# Patient Record
Sex: FEMALE | Race: WHITE | NOT HISPANIC OR LATINO | Employment: OTHER | URBAN - METROPOLITAN AREA
[De-identification: names, ages, dates, MRNs, and addresses within clinical notes are randomized per-mention and may not be internally consistent; named-entity substitution may affect disease eponyms.]

---

## 2017-01-05 ENCOUNTER — HOSPITAL ENCOUNTER (OUTPATIENT)
Dept: ONCOLOGY | Facility: CLINIC | Age: 62
Discharge: HOME | End: 2017-01-05
Attending: INTERNAL MEDICINE | Admitting: INTERNAL MEDICINE

## 2017-01-05 LAB — MAGNESIUM SERPL-MCNC: 1.3 MG/DL (ref 1.8–2.5)

## 2017-01-12 ENCOUNTER — HOSPITAL ENCOUNTER (OUTPATIENT)
Dept: CARDIOLOGY | Facility: HOSPITAL | Age: 62
Discharge: HOME | End: 2017-01-12
Attending: INTERNAL MEDICINE | Admitting: INTERNAL MEDICINE

## 2017-01-13 ENCOUNTER — HOSPITAL ENCOUNTER (OUTPATIENT)
Dept: ONCOLOGY | Facility: CLINIC | Age: 62
Discharge: HOME | End: 2017-01-13
Attending: INTERNAL MEDICINE | Admitting: INTERNAL MEDICINE

## 2017-01-13 LAB — MAGNESIUM SERPL-MCNC: 1 MG/DL (ref 1.8–2.5)

## 2017-01-19 ENCOUNTER — HOSPITAL ENCOUNTER (OUTPATIENT)
Dept: ONCOLOGY | Facility: CLINIC | Age: 62
Discharge: HOME | End: 2017-01-19
Attending: INTERNAL MEDICINE | Admitting: INTERNAL MEDICINE

## 2017-01-19 LAB
ALBUMIN SERPL-MCNC: 3.5 G/DL (ref 3.5–4.8)
ALBUMIN/GLOB SERPL: 1.3 {RATIO} (ref 1–1.7)
ALP SERPL-CCNC: 79 IU/L (ref 32–91)
ALT SERPL-CCNC: 25 IU/L (ref 14–54)
ANION GAP SERPL CALC-SCNC: 13.3 MMOL/L (ref 10–20)
AST SERPL-CCNC: 31 IU/L (ref 15–41)
BILIRUB SERPL-MCNC: 0.3 MG/DL (ref 0.3–1.2)
BUN SERPL-MCNC: 19 MG/DL (ref 8–20)
BUN/CREAT SERPL: 19 (ref 5.4–26.2)
CALCIUM SERPL-MCNC: 9.7 MG/DL (ref 8.9–10.3)
CHLORIDE SERPL-SCNC: 105 MMOL/L (ref 101–111)
CONV CO2: 24 MMOL/L (ref 22–32)
CONV TOTAL PROTEIN: 6.2 G/DL (ref 6.1–7.9)
CREAT UR-MCNC: 1 MG/DL (ref 0.4–1)
GLOBULIN UR ELPH-MCNC: 2.7 G/DL (ref 2.5–3.8)
GLUCOSE SERPL-MCNC: 137 MG/DL (ref 65–99)
POTASSIUM SERPL-SCNC: 4.3 MMOL/L (ref 3.6–5.1)
SODIUM SERPL-SCNC: 138 MMOL/L (ref 136–144)

## 2017-01-30 ENCOUNTER — HOSPITAL ENCOUNTER (OUTPATIENT)
Dept: ONCOLOGY | Facility: CLINIC | Age: 62
Discharge: HOME | End: 2017-01-30
Attending: INTERNAL MEDICINE | Admitting: INTERNAL MEDICINE

## 2017-01-30 LAB — MAGNESIUM SERPL-MCNC: 1.4 MG/DL (ref 1.8–2.5)

## 2017-02-02 ENCOUNTER — HOSPITAL ENCOUNTER (OUTPATIENT)
Dept: PAIN MEDICINE | Facility: HOSPITAL | Age: 62
Discharge: HOME | End: 2017-02-02
Attending: PHYSICAL MEDICINE & REHABILITATION | Admitting: PHYSICAL MEDICINE & REHABILITATION

## 2017-02-03 ENCOUNTER — HOSPITAL ENCOUNTER (OUTPATIENT)
Dept: ONCOLOGY | Facility: CLINIC | Age: 62
Discharge: HOME | End: 2017-02-03
Attending: NURSE PRACTITIONER | Admitting: NURSE PRACTITIONER

## 2017-02-06 ENCOUNTER — HOSPITAL ENCOUNTER (OUTPATIENT)
Dept: ONCOLOGY | Facility: CLINIC | Age: 62
Discharge: HOME | End: 2017-02-06
Attending: INTERNAL MEDICINE | Admitting: INTERNAL MEDICINE

## 2017-02-06 LAB
INR PPP: 1.1 (ref 2–3)
MAGNESIUM SERPL-MCNC: 1.1 MG/DL (ref 1.8–2.5)
PROTHROMBIN TIME: 14.8 SEC (ref 19.4–28.5)

## 2017-02-09 ENCOUNTER — HOSPITAL ENCOUNTER (OUTPATIENT)
Dept: ONCOLOGY | Facility: CLINIC | Age: 62
Discharge: HOME | End: 2017-02-09
Attending: INTERNAL MEDICINE | Admitting: INTERNAL MEDICINE

## 2017-02-16 ENCOUNTER — HOSPITAL ENCOUNTER (OUTPATIENT)
Dept: ONCOLOGY | Facility: CLINIC | Age: 62
Discharge: HOME | End: 2017-02-16
Attending: INTERNAL MEDICINE | Admitting: INTERNAL MEDICINE

## 2017-02-23 ENCOUNTER — HOSPITAL ENCOUNTER (OUTPATIENT)
Dept: ONCOLOGY | Facility: CLINIC | Age: 62
Discharge: HOME | End: 2017-02-23
Attending: INTERNAL MEDICINE | Admitting: INTERNAL MEDICINE

## 2017-02-27 ENCOUNTER — HOSPITAL ENCOUNTER (OUTPATIENT)
Dept: ONCOLOGY | Facility: CLINIC | Age: 62
Discharge: HOME | End: 2017-02-27
Attending: INTERNAL MEDICINE | Admitting: INTERNAL MEDICINE

## 2017-02-27 LAB — CREAT BLDA-MCNC: 0.8 MG/DL (ref 0.6–1.3)

## 2017-03-02 ENCOUNTER — HOSPITAL ENCOUNTER (OUTPATIENT)
Dept: ONCOLOGY | Facility: CLINIC | Age: 62
Discharge: HOME | End: 2017-03-02
Attending: INTERNAL MEDICINE | Admitting: INTERNAL MEDICINE

## 2017-03-02 LAB — MAGNESIUM SERPL-MCNC: 1.5 MG/DL (ref 1.8–2.5)

## 2017-03-06 ENCOUNTER — HOSPITAL ENCOUNTER (OUTPATIENT)
Dept: ONCOLOGY | Facility: CLINIC | Age: 62
Discharge: HOME | End: 2017-03-06
Attending: INTERNAL MEDICINE | Admitting: INTERNAL MEDICINE

## 2017-03-06 LAB
ALBUMIN SERPL-MCNC: 3.6 G/DL (ref 3.5–4.8)
ALBUMIN/GLOB SERPL: 1.3 {RATIO} (ref 1–1.7)
ALP SERPL-CCNC: 78 IU/L (ref 32–91)
ALT SERPL-CCNC: 24 IU/L (ref 14–54)
ANION GAP SERPL CALC-SCNC: 14 MMOL/L (ref 10–20)
AST SERPL-CCNC: 22 IU/L (ref 15–41)
BILIRUB SERPL-MCNC: 0.7 MG/DL (ref 0.3–1.2)
BUN SERPL-MCNC: 16 MG/DL (ref 8–20)
BUN/CREAT SERPL: 17.8 (ref 5.4–26.2)
CALCIUM SERPL-MCNC: 9.4 MG/DL (ref 8.9–10.3)
CANCER AG125 SERPL-ACNC: 18 U/ML (ref 0–35)
CHLORIDE SERPL-SCNC: 101 MMOL/L (ref 101–111)
CONV CO2: 28 MMOL/L (ref 22–32)
CONV TOTAL PROTEIN: 6.3 G/DL (ref 6.1–7.9)
CREAT UR-MCNC: 0.9 MG/DL (ref 0.4–1)
GLOBULIN UR ELPH-MCNC: 2.7 G/DL (ref 2.5–3.8)
GLUCOSE SERPL-MCNC: 120 MG/DL (ref 65–99)
MAGNESIUM SERPL-MCNC: 1.4 MG/DL (ref 1.8–2.5)
POTASSIUM SERPL-SCNC: 4 MMOL/L (ref 3.6–5.1)
SODIUM SERPL-SCNC: 139 MMOL/L (ref 136–144)

## 2017-03-09 ENCOUNTER — HOSPITAL ENCOUNTER (OUTPATIENT)
Dept: ONCOLOGY | Facility: CLINIC | Age: 62
Discharge: HOME | End: 2017-03-09
Attending: INTERNAL MEDICINE | Admitting: INTERNAL MEDICINE

## 2017-03-09 LAB — MAGNESIUM SERPL-MCNC: 1.2 MG/DL (ref 1.8–2.5)

## 2017-03-13 ENCOUNTER — HOSPITAL ENCOUNTER (OUTPATIENT)
Dept: ONCOLOGY | Facility: CLINIC | Age: 62
Discharge: HOME | End: 2017-03-13
Attending: INTERNAL MEDICINE | Admitting: INTERNAL MEDICINE

## 2017-03-13 LAB — MAGNESIUM SERPL-MCNC: 1.2 MG/DL (ref 1.8–2.5)

## 2017-03-16 ENCOUNTER — HOSPITAL ENCOUNTER (OUTPATIENT)
Dept: ONCOLOGY | Facility: CLINIC | Age: 62
Discharge: HOME | End: 2017-03-16
Attending: INTERNAL MEDICINE | Admitting: INTERNAL MEDICINE

## 2017-03-16 LAB
ALBUMIN SERPL-MCNC: 3.1 G/DL (ref 3.5–4.8)
ALBUMIN/GLOB SERPL: 1.1 {RATIO} (ref 1–1.7)
ALP SERPL-CCNC: 66 IU/L (ref 32–91)
ALT SERPL-CCNC: 20 IU/L (ref 14–54)
ANION GAP SERPL CALC-SCNC: 16.2 MMOL/L (ref 10–20)
AST SERPL-CCNC: 24 IU/L (ref 15–41)
BILIRUB SERPL-MCNC: 0.3 MG/DL (ref 0.3–1.2)
BUN SERPL-MCNC: 18 MG/DL (ref 8–20)
BUN/CREAT SERPL: 18 (ref 5.4–26.2)
CALCIUM SERPL-MCNC: 9.6 MG/DL (ref 8.9–10.3)
CHLORIDE SERPL-SCNC: 102 MMOL/L (ref 101–111)
CONV CO2: 21 MMOL/L (ref 22–32)
CONV TOTAL PROTEIN: 6 G/DL (ref 6.1–7.9)
CREAT UR-MCNC: 1 MG/DL (ref 0.4–1)
GLOBULIN UR ELPH-MCNC: 2.9 G/DL (ref 2.5–3.8)
GLUCOSE SERPL-MCNC: 159 MG/DL (ref 65–99)
POTASSIUM SERPL-SCNC: 4.2 MMOL/L (ref 3.6–5.1)
SODIUM SERPL-SCNC: 135 MMOL/L (ref 136–144)

## 2017-03-21 ENCOUNTER — INPATIENT HOSPITAL (OUTPATIENT)
Dept: URBAN - METROPOLITAN AREA HOSPITAL 84 | Facility: HOSPITAL | Age: 62
End: 2017-03-21
Payer: COMMERCIAL

## 2017-03-21 DIAGNOSIS — R11.2 NAUSEA WITH VOMITING, UNSPECIFIED: ICD-10-CM

## 2017-03-21 DIAGNOSIS — R07.89 OTHER CHEST PAIN: ICD-10-CM

## 2017-03-21 DIAGNOSIS — D50.9 IRON DEFICIENCY ANEMIA, UNSPECIFIED: ICD-10-CM

## 2017-03-21 DIAGNOSIS — K44.9 DIAPHRAGMATIC HERNIA WITHOUT OBSTRUCTION OR GANGRENE: ICD-10-CM

## 2017-03-21 PROCEDURE — 99222 1ST HOSP IP/OBS MODERATE 55: CPT | Performed by: NURSE PRACTITIONER

## 2017-03-21 PROCEDURE — 43249 ESOPH EGD DILATION <30 MM: CPT | Performed by: INTERNAL MEDICINE

## 2017-03-27 ENCOUNTER — HOSPITAL ENCOUNTER (OUTPATIENT)
Dept: LAB | Facility: HOSPITAL | Age: 62
Discharge: HOME | End: 2017-03-27
Attending: INTERNAL MEDICINE | Admitting: INTERNAL MEDICINE

## 2017-03-27 LAB
ALBUMIN SERPL-MCNC: 2.7 G/DL (ref 3.5–4.8)
ALBUMIN/GLOB SERPL: 0.8 {RATIO} (ref 1–1.7)
ALP SERPL-CCNC: 70 IU/L (ref 32–91)
ALT SERPL-CCNC: 23 IU/L (ref 14–54)
ANION GAP SERPL CALC-SCNC: 17.9 MMOL/L (ref 10–20)
AST SERPL-CCNC: 28 IU/L (ref 15–41)
BASOPHILS # BLD AUTO: 0 10*3/UL (ref 0–0.2)
BASOPHILS NFR BLD AUTO: 0 % (ref 0–2)
BILIRUB SERPL-MCNC: 0.4 MG/DL (ref 0.3–1.2)
BUN SERPL-MCNC: 19 MG/DL (ref 8–20)
BUN/CREAT SERPL: 19 (ref 5.4–26.2)
CALCIUM SERPL-MCNC: 9.5 MG/DL (ref 8.9–10.3)
CHLORIDE SERPL-SCNC: 95 MMOL/L (ref 101–111)
CONV CO2: 27 MMOL/L (ref 22–32)
CONV TOTAL PROTEIN: 5.9 G/DL (ref 6.1–7.9)
CREAT UR-MCNC: 1 MG/DL (ref 0.4–1)
DIFFERENTIAL METHOD BLD: (no result)
EOSINOPHIL # BLD AUTO: 0 10*3/UL (ref 0–0.3)
EOSINOPHIL # BLD AUTO: 1 % (ref 0–3)
ERYTHROCYTE [DISTWIDTH] IN BLOOD BY AUTOMATED COUNT: 18.8 % (ref 11.5–14.5)
GLOBULIN UR ELPH-MCNC: 3.2 G/DL (ref 2.5–3.8)
GLUCOSE SERPL-MCNC: 129 MG/DL (ref 65–99)
HCT VFR BLD AUTO: 29.6 % (ref 35–49)
HGB BLD-MCNC: (no result) G/DL (ref 12–15)
INR PPP: 1 (ref 2–3)
LYMPHOCYTES # BLD AUTO: 0.4 10*3/UL (ref 0.8–4.8)
LYMPHOCYTES NFR BLD AUTO: 9 % (ref 18–42)
MAGNESIUM SERPL-MCNC: 1.4 MG/DL (ref 1.8–2.5)
MCH RBC QN AUTO: 30.4 PG (ref 26–32)
MCHC RBC AUTO-ENTMCNC: 33.1 G/DL (ref 32–36)
MCV RBC AUTO: 91.9 FL (ref 80–94)
MONOCYTES # BLD AUTO: 0.8 10*3/UL (ref 0.1–1.3)
MONOCYTES NFR BLD AUTO: 18 % (ref 2–11)
NEUTROPHILS # BLD AUTO: 3.2 10*3/UL (ref 2.3–8.6)
NEUTROPHILS NFR BLD AUTO: 72 % (ref 50–75)
NRBC BLD AUTO-RTO: 0 /100{WBCS}
NRBC/RBC NFR BLD MANUAL: 0 10*3/UL
PHOSPHATE SERPL-MCNC: 2 MG/DL (ref 2.4–4.7)
PLATELET # BLD AUTO: 117 10*3/UL (ref 150–450)
PMV BLD AUTO: 10.8 FL (ref 7.4–10.4)
POTASSIUM SERPL-SCNC: 3.9 MMOL/L (ref 3.6–5.1)
PROTHROMBIN TIME: 11.6 SEC (ref 19.4–28.5)
RBC # BLD AUTO: 3.22 10*6/UL (ref 4–5.4)
SODIUM SERPL-SCNC: 136 MMOL/L (ref 136–144)
WBC # BLD AUTO: 4.5 10*3/UL (ref 4.5–11.5)

## 2017-04-06 ENCOUNTER — HOSPITAL ENCOUNTER (OUTPATIENT)
Dept: PAIN MEDICINE | Facility: HOSPITAL | Age: 62
Discharge: HOME | End: 2017-04-06
Attending: PHYSICAL MEDICINE & REHABILITATION | Admitting: PHYSICAL MEDICINE & REHABILITATION

## 2017-04-06 ENCOUNTER — HOSPITAL ENCOUNTER (OUTPATIENT)
Dept: ONCOLOGY | Facility: CLINIC | Age: 62
Discharge: HOME | End: 2017-04-06
Attending: INTERNAL MEDICINE | Admitting: INTERNAL MEDICINE

## 2017-04-06 LAB
ALBUMIN SERPL-MCNC: 3 G/DL (ref 3.5–4.8)
ALBUMIN/GLOB SERPL: 1 {RATIO} (ref 1–1.7)
ALP SERPL-CCNC: 75 IU/L (ref 32–91)
ALT SERPL-CCNC: 20 IU/L (ref 14–54)
ANION GAP SERPL CALC-SCNC: 14.3 MMOL/L (ref 10–20)
AST SERPL-CCNC: 26 IU/L (ref 15–41)
BILIRUB SERPL-MCNC: 0.3 MG/DL (ref 0.3–1.2)
BUN SERPL-MCNC: 11 MG/DL (ref 8–20)
BUN/CREAT SERPL: 12.2 (ref 5.4–26.2)
CALCIUM SERPL-MCNC: 9.2 MG/DL (ref 8.9–10.3)
CHLORIDE SERPL-SCNC: 104 MMOL/L (ref 101–111)
CONV CO2: 26 MMOL/L (ref 22–32)
CONV TOTAL PROTEIN: 6.1 G/DL (ref 6.1–7.9)
CREAT UR-MCNC: 0.9 MG/DL (ref 0.4–1)
GLOBULIN UR ELPH-MCNC: 3.1 G/DL (ref 2.5–3.8)
GLUCOSE SERPL-MCNC: 76 MG/DL (ref 65–99)
MAGNESIUM SERPL-MCNC: 1.2 MG/DL (ref 1.8–2.5)
POTASSIUM SERPL-SCNC: 4.3 MMOL/L (ref 3.6–5.1)
SODIUM SERPL-SCNC: 140 MMOL/L (ref 136–144)

## 2017-04-13 ENCOUNTER — HOSPITAL ENCOUNTER (OUTPATIENT)
Dept: ONCOLOGY | Facility: CLINIC | Age: 62
Discharge: HOME | End: 2017-04-13
Attending: INTERNAL MEDICINE | Admitting: INTERNAL MEDICINE

## 2017-04-20 ENCOUNTER — HOSPITAL ENCOUNTER (OUTPATIENT)
Dept: ONCOLOGY | Facility: CLINIC | Age: 62
Discharge: HOME | End: 2017-04-20
Attending: INTERNAL MEDICINE | Admitting: INTERNAL MEDICINE

## 2017-05-01 ENCOUNTER — HOSPITAL ENCOUNTER (OUTPATIENT)
Dept: FAMILY MEDICINE CLINIC | Facility: CLINIC | Age: 62
Setting detail: SPECIMEN
Discharge: HOME | End: 2017-05-01
Attending: FAMILY MEDICINE | Admitting: FAMILY MEDICINE

## 2017-05-01 LAB
CHOLEST SERPL-MCNC: 305 MG/DL
CHOLEST/HDLC SERPL: 5.3 {RATIO}
CONV LDL CHOLESTEROL DIRECT: 217 MG/DL (ref 0–100)
HDLC SERPL-MCNC: 57 MG/DL
LDLC/HDLC SERPL: 3.8 {RATIO}
LIPID INTERPRETATION: ABNORMAL
TRIGL SERPL-MCNC: 85 MG/DL
VLDLC SERPL CALC-MCNC: 30.4 MG/DL

## 2017-05-04 ENCOUNTER — HOSPITAL ENCOUNTER (OUTPATIENT)
Dept: ONCOLOGY | Facility: CLINIC | Age: 62
Discharge: HOME | End: 2017-05-04
Attending: INTERNAL MEDICINE | Admitting: INTERNAL MEDICINE

## 2017-05-16 ENCOUNTER — HOSPITAL ENCOUNTER (OUTPATIENT)
Dept: ONCOLOGY | Facility: CLINIC | Age: 62
Discharge: HOME | End: 2017-05-16
Attending: INTERNAL MEDICINE | Admitting: INTERNAL MEDICINE

## 2017-05-16 LAB
ALBUMIN SERPL-MCNC: 3.4 G/DL (ref 3.5–4.8)
ALBUMIN/GLOB SERPL: 1.3 {RATIO} (ref 1–1.7)
ALP SERPL-CCNC: 89 IU/L (ref 32–91)
ALT SERPL-CCNC: 13 IU/L (ref 14–54)
ANION GAP SERPL CALC-SCNC: 13.4 MMOL/L (ref 10–20)
AST SERPL-CCNC: 23 IU/L (ref 15–41)
BILIRUB SERPL-MCNC: 0.2 MG/DL (ref 0.3–1.2)
BUN SERPL-MCNC: 11 MG/DL (ref 8–20)
BUN/CREAT SERPL: 11 (ref 5.4–26.2)
CALCIUM SERPL-MCNC: 9.1 MG/DL (ref 8.9–10.3)
CHLORIDE SERPL-SCNC: 104 MMOL/L (ref 101–111)
CONV CO2: 26 MMOL/L (ref 22–32)
CONV TOTAL PROTEIN: 6.1 G/DL (ref 6.1–7.9)
CREAT UR-MCNC: 1 MG/DL (ref 0.4–1)
GLOBULIN UR ELPH-MCNC: 2.7 G/DL (ref 2.5–3.8)
GLUCOSE SERPL-MCNC: 101 MG/DL (ref 65–99)
MAGNESIUM SERPL-MCNC: 1.5 MG/DL (ref 1.8–2.5)
POTASSIUM SERPL-SCNC: 3.4 MMOL/L (ref 3.6–5.1)
SODIUM SERPL-SCNC: 140 MMOL/L (ref 136–144)

## 2017-05-26 ENCOUNTER — HOSPITAL ENCOUNTER (OUTPATIENT)
Dept: ONCOLOGY | Facility: CLINIC | Age: 62
Discharge: HOME | End: 2017-05-26
Attending: INTERNAL MEDICINE | Admitting: INTERNAL MEDICINE

## 2017-05-26 LAB
ALBUMIN SERPL-MCNC: 3.3 G/DL (ref 3.5–4.8)
ALBUMIN/GLOB SERPL: 1.2 {RATIO} (ref 1–1.7)
ALP SERPL-CCNC: 105 IU/L (ref 32–91)
ALT SERPL-CCNC: 16 IU/L (ref 14–54)
ANION GAP SERPL CALC-SCNC: 15.9 MMOL/L (ref 10–20)
AST SERPL-CCNC: 22 IU/L (ref 15–41)
BILIRUB SERPL-MCNC: 0.5 MG/DL (ref 0.3–1.2)
BUN SERPL-MCNC: 10 MG/DL (ref 8–20)
BUN/CREAT SERPL: 12.5 (ref 5.4–26.2)
CALCIUM SERPL-MCNC: 9 MG/DL (ref 8.9–10.3)
CHLORIDE SERPL-SCNC: 102 MMOL/L (ref 101–111)
CONV CO2: 25 MMOL/L (ref 22–32)
CONV TOTAL PROTEIN: 6.1 G/DL (ref 6.1–7.9)
CREAT UR-MCNC: 0.8 MG/DL (ref 0.4–1)
GLOBULIN UR ELPH-MCNC: 2.8 G/DL (ref 2.5–3.8)
GLUCOSE SERPL-MCNC: 94 MG/DL (ref 65–99)
MAGNESIUM SERPL-MCNC: 1.4 MG/DL (ref 1.8–2.5)
POTASSIUM SERPL-SCNC: 2.9 MMOL/L (ref 3.6–5.1)
SODIUM SERPL-SCNC: 140 MMOL/L (ref 136–144)

## 2017-05-30 ENCOUNTER — HOSPITAL ENCOUNTER (OUTPATIENT)
Dept: ONCOLOGY | Facility: CLINIC | Age: 62
Discharge: HOME | End: 2017-05-30
Attending: INTERNAL MEDICINE | Admitting: INTERNAL MEDICINE

## 2017-05-30 LAB — GLUCOSE BLD-MCNC: 89 MG/DL (ref 70–105)

## 2017-06-06 ENCOUNTER — HOSPITAL ENCOUNTER (OUTPATIENT)
Dept: PAIN MEDICINE | Facility: HOSPITAL | Age: 62
Discharge: HOME | End: 2017-06-06
Attending: PHYSICAL MEDICINE & REHABILITATION | Admitting: PHYSICAL MEDICINE & REHABILITATION

## 2017-06-08 ENCOUNTER — HOSPITAL ENCOUNTER (OUTPATIENT)
Dept: ONCOLOGY | Facility: CLINIC | Age: 62
Discharge: HOME | End: 2017-06-08
Attending: INTERNAL MEDICINE | Admitting: INTERNAL MEDICINE

## 2017-06-13 ENCOUNTER — HOSPITAL ENCOUNTER (OUTPATIENT)
Dept: ONCOLOGY | Facility: CLINIC | Age: 62
Discharge: HOME | End: 2017-06-13
Attending: INTERNAL MEDICINE | Admitting: INTERNAL MEDICINE

## 2017-06-13 LAB
ALBUMIN SERPL-MCNC: 3.3 G/DL (ref 3.5–4.8)
ALBUMIN/GLOB SERPL: 1.4 {RATIO} (ref 1–1.7)
ALP SERPL-CCNC: 83 IU/L (ref 32–91)
ALT SERPL-CCNC: 24 IU/L (ref 14–54)
ANION GAP SERPL CALC-SCNC: 13.6 MMOL/L (ref 10–20)
AST SERPL-CCNC: 34 IU/L (ref 15–41)
BILIRUB SERPL-MCNC: 0.2 MG/DL (ref 0.3–1.2)
BUN SERPL-MCNC: 5 MG/DL (ref 8–20)
BUN/CREAT SERPL: 5.6 (ref 5.4–26.2)
CALCIUM SERPL-MCNC: 8.8 MG/DL (ref 8.9–10.3)
CHLORIDE SERPL-SCNC: 106 MMOL/L (ref 101–111)
CONV CO2: 24 MMOL/L (ref 22–32)
CONV TOTAL PROTEIN: 5.7 G/DL (ref 6.1–7.9)
CREAT UR-MCNC: 0.9 MG/DL (ref 0.4–1)
GLOBULIN UR ELPH-MCNC: 2.4 G/DL (ref 2.5–3.8)
GLUCOSE SERPL-MCNC: 98 MG/DL (ref 65–99)
MAGNESIUM SERPL-MCNC: 1.5 MG/DL (ref 1.8–2.5)
POTASSIUM SERPL-SCNC: 3.6 MMOL/L (ref 3.6–5.1)
SODIUM SERPL-SCNC: 140 MMOL/L (ref 136–144)

## 2017-06-20 ENCOUNTER — HOSPITAL ENCOUNTER (OUTPATIENT)
Dept: ONCOLOGY | Facility: CLINIC | Age: 62
Discharge: HOME | End: 2017-06-20
Attending: INTERNAL MEDICINE | Admitting: INTERNAL MEDICINE

## 2017-06-20 LAB — MAGNESIUM SERPL-MCNC: 1.9 MG/DL (ref 1.8–2.5)

## 2017-06-27 ENCOUNTER — HOSPITAL ENCOUNTER (OUTPATIENT)
Dept: ONCOLOGY | Facility: CLINIC | Age: 62
Discharge: HOME | End: 2017-06-27
Attending: INTERNAL MEDICINE | Admitting: INTERNAL MEDICINE

## 2017-06-27 LAB
ALBUMIN SERPL-MCNC: 2.8 G/DL (ref 3.5–4.8)
ALBUMIN/GLOB SERPL: 1.3 {RATIO} (ref 1–1.7)
ALP SERPL-CCNC: 66 IU/L (ref 32–91)
ALT SERPL-CCNC: 24 IU/L (ref 14–54)
ANION GAP SERPL CALC-SCNC: 11 MMOL/L (ref 10–20)
AST SERPL-CCNC: 37 IU/L (ref 15–41)
BILIRUB SERPL-MCNC: 0.5 MG/DL (ref 0.3–1.2)
BUN SERPL-MCNC: 10 MG/DL (ref 8–20)
BUN/CREAT SERPL: 11.1 (ref 5.4–26.2)
CALCIUM SERPL-MCNC: 8.9 MG/DL (ref 8.9–10.3)
CHLORIDE SERPL-SCNC: 107 MMOL/L (ref 101–111)
CONV CO2: 28 MMOL/L (ref 22–32)
CONV TOTAL PROTEIN: 5 G/DL (ref 6.1–7.9)
CREAT UR-MCNC: 0.9 MG/DL (ref 0.4–1)
GLOBULIN UR ELPH-MCNC: 2.2 G/DL (ref 2.5–3.8)
GLUCOSE SERPL-MCNC: 100 MG/DL (ref 65–99)
POTASSIUM SERPL-SCNC: 4 MMOL/L (ref 3.6–5.1)
SODIUM SERPL-SCNC: 142 MMOL/L (ref 136–144)

## 2017-07-05 ENCOUNTER — HOSPITAL ENCOUNTER (OUTPATIENT)
Dept: GENERAL RADIOLOGY | Facility: HOSPITAL | Age: 62
Discharge: HOME | End: 2017-07-05
Attending: FAMILY MEDICINE | Admitting: FAMILY MEDICINE

## 2017-07-05 LAB
ABS VARIANT LYMPHS: 0.36 10*3/UL
ALBUMIN SERPL-MCNC: 2.9 G/DL (ref 3.5–4.8)
ALBUMIN/GLOB SERPL: 1.4 {RATIO} (ref 1–1.7)
ALP SERPL-CCNC: 82 IU/L (ref 32–91)
ALT SERPL-CCNC: 41 IU/L (ref 14–54)
ANION GAP SERPL CALC-SCNC: 12.1 MMOL/L (ref 10–20)
AST SERPL-CCNC: 49 IU/L (ref 15–41)
BILIRUB SERPL-MCNC: 0.5 MG/DL (ref 0.3–1.2)
BUN SERPL-MCNC: 15 MG/DL (ref 8–20)
BUN/CREAT SERPL: 16.7 (ref 5.4–26.2)
C3 FRG RBC-MCNC: (no result)
CALCIUM SERPL-MCNC: 8.9 MG/DL (ref 8.9–10.3)
CHLORIDE SERPL-SCNC: 108 MMOL/L (ref 101–111)
CONV ABS BANDS: 0.4 10*3/UL
CONV ABS IMM GRAN: 0.09 10*3/UL
CONV ANISOCYTES: (no result)
CONV CO2: 27 MMOL/L (ref 22–32)
CONV DACROCYTES (PRESENCE) IN BLOOD BY LIGHT MICROSCOPY: (no result)
CONV HYPOCHROMIA IN BLOOD BY LIGHT MICROSCOPY: (no result)
CONV MICROCYTES IN BLOOD BY LIGHT MICROSCOPY: (no result)
CONV POLYCHROMASIA IN BLOOD BY LIGHT MICROSCOPY: (no result)
CONV TOTAL PROTEIN: 5 G/DL (ref 6.1–7.9)
CONV VARIANT LYMPHOCYTES RELATIVE PERCENT BY MANUAL COUNT: 4 % (ref 0–1)
CREAT UR-MCNC: 0.9 MG/DL (ref 0.4–1)
DIFFERENTIAL METHOD BLD: (no result)
EOSINOPHIL # BLD AUTO: 0.3 10*3/UL (ref 0–0.3)
EOSINOPHIL # BLD AUTO: 3 % (ref 0–3)
ERYTHROCYTE [DISTWIDTH] IN BLOOD BY AUTOMATED COUNT: (no result) % (ref 11.5–14.5)
GLOBULIN UR ELPH-MCNC: 2.1 G/DL (ref 2.5–3.8)
GLUCOSE SERPL-MCNC: 80 MG/DL (ref 65–99)
HCT VFR BLD AUTO: 24.4 % (ref 35–49)
HGB BLD-MCNC: 7.7 G/DL (ref 12–15)
LYMPHOCYTES # BLD AUTO: 3.1 10*3/UL (ref 0.8–4.8)
LYMPHOCYTES NFR BLD AUTO: 35 % (ref 18–42)
MCH RBC QN AUTO: 29.7 PG (ref 26–32)
MCHC RBC AUTO-ENTMCNC: 31.7 G/DL (ref 32–36)
MCV RBC AUTO: 93.8 FL (ref 80–94)
METAMYELOCYTES NFR BLD: 1 %
MONOCYTES # BLD AUTO: 0.6 10*3/UL (ref 0.1–1.3)
MONOCYTES NFR BLD AUTO: 7 % (ref 2–11)
NEUTROPHILS # BLD AUTO: 4 10*3/UL (ref 2.3–8.6)
NEUTROPHILS NFR BLD AUTO: 46 % (ref 50–75)
NEUTS BAND NFR BLD MANUAL: 4 % (ref 0–5)
NRBC BLD AUTO-RTO: 16 /100{WBCS}
PATHOLOGIST REVIEW: (no result)
PATHOLOGIST REVIEW: (no result)
PLATELET # BLD AUTO: (no result) 10*3/UL (ref 150–450)
PMV BLD AUTO: 10.1 FL (ref 7.4–10.4)
POTASSIUM SERPL-SCNC: 5.1 MMOL/L (ref 3.6–5.1)
RBC # BLD AUTO: 2.6 10*6/UL (ref 4–5.4)
SODIUM SERPL-SCNC: 142 MMOL/L (ref 136–144)
T3FREE SERPL-MCNC: 3.05 PG/ML (ref 2.39–6.79)
T4 FREE SERPL-MCNC: 0.94 NG/DL (ref 0.58–1.64)
TSH SERPL-ACNC: 2.46 UIU/ML (ref 0.34–5.6)
WBC # BLD AUTO: 8.9 10*3/UL (ref 4.5–11.5)

## 2017-07-06 LAB
FERRITIN SERPL-MCNC: 211 NG/ML (ref 11–307)
IRON SATN MFR SERPL: 10 % (ref 15–50)
IRON SERPL-MCNC: 33 UG/DL (ref 28–170)
TIBC SERPL-MCNC: 328 UG/DL (ref 228–428)

## 2017-07-11 ENCOUNTER — HOSPITAL ENCOUNTER (OUTPATIENT)
Dept: PAIN MEDICINE | Facility: HOSPITAL | Age: 62
Discharge: HOME | End: 2017-07-11
Attending: PHYSICAL MEDICINE & REHABILITATION | Admitting: PHYSICAL MEDICINE & REHABILITATION

## 2017-07-17 ENCOUNTER — HOSPITAL ENCOUNTER (OUTPATIENT)
Dept: LAB | Facility: HOSPITAL | Age: 62
Discharge: HOME | End: 2017-07-17
Attending: INTERNAL MEDICINE | Admitting: INTERNAL MEDICINE

## 2017-07-17 LAB
ANION GAP SERPL CALC-SCNC: 16.4 MMOL/L (ref 10–20)
BUN SERPL-MCNC: 18 MG/DL (ref 8–20)
BUN/CREAT SERPL: 20 (ref 5.4–26.2)
CALCIUM SERPL-MCNC: 9.7 MG/DL (ref 8.9–10.3)
CHLORIDE SERPL-SCNC: 97 MMOL/L (ref 101–111)
CONV CO2: 30 MMOL/L (ref 22–32)
CREAT UR-MCNC: 0.9 MG/DL (ref 0.4–1)
GLUCOSE SERPL-MCNC: 93 MG/DL (ref 65–99)
MAGNESIUM SERPL-MCNC: 1.7 MG/DL (ref 1.8–2.5)
POTASSIUM SERPL-SCNC: 3.4 MMOL/L (ref 3.6–5.1)
SODIUM SERPL-SCNC: 140 MMOL/L (ref 136–144)

## 2017-07-31 ENCOUNTER — HOSPITAL ENCOUNTER (OUTPATIENT)
Dept: ONCOLOGY | Facility: CLINIC | Age: 62
Discharge: HOME | End: 2017-07-31
Attending: INTERNAL MEDICINE | Admitting: INTERNAL MEDICINE

## 2017-08-02 ENCOUNTER — HOSPITAL ENCOUNTER (OUTPATIENT)
Dept: ONCOLOGY | Facility: CLINIC | Age: 62
Discharge: HOME | End: 2017-08-02
Attending: NURSE PRACTITIONER | Admitting: NURSE PRACTITIONER

## 2017-08-07 ENCOUNTER — HOSPITAL ENCOUNTER (OUTPATIENT)
Dept: ONCOLOGY | Facility: CLINIC | Age: 62
Discharge: HOME | End: 2017-08-07
Attending: NURSE PRACTITIONER | Admitting: NURSE PRACTITIONER

## 2017-08-09 ENCOUNTER — HOSPITAL ENCOUNTER (OUTPATIENT)
Dept: ONCOLOGY | Facility: CLINIC | Age: 62
Discharge: HOME | End: 2017-08-09
Attending: NURSE PRACTITIONER | Admitting: NURSE PRACTITIONER

## 2017-08-14 ENCOUNTER — HOSPITAL ENCOUNTER (OUTPATIENT)
Dept: ONCOLOGY | Facility: CLINIC | Age: 62
Discharge: HOME | End: 2017-08-14
Attending: NURSE PRACTITIONER | Admitting: NURSE PRACTITIONER

## 2017-08-16 ENCOUNTER — HOSPITAL ENCOUNTER (OUTPATIENT)
Dept: ONCOLOGY | Facility: CLINIC | Age: 62
Discharge: HOME | End: 2017-08-16
Attending: NURSE PRACTITIONER | Admitting: NURSE PRACTITIONER

## 2017-08-24 ENCOUNTER — HOSPITAL ENCOUNTER (OUTPATIENT)
Dept: ONCOLOGY | Facility: CLINIC | Age: 62
Setting detail: INFUSION SERIES
Discharge: HOME | End: 2017-08-24
Attending: NURSE PRACTITIONER | Admitting: NURSE PRACTITIONER

## 2017-08-28 ENCOUNTER — HOSPITAL ENCOUNTER (OUTPATIENT)
Dept: ONCOLOGY | Facility: CLINIC | Age: 62
Setting detail: INFUSION SERIES
Discharge: HOME | End: 2017-08-28
Attending: NURSE PRACTITIONER | Admitting: NURSE PRACTITIONER

## 2017-08-28 ENCOUNTER — CLINICAL SUPPORT (OUTPATIENT)
Dept: ONCOLOGY | Facility: HOSPITAL | Age: 62
End: 2017-08-28

## 2017-08-28 NOTE — PROGRESS NOTES
PATIENTS ONCOLOGY RECORD LOCATED IN [facility]      Subjective     Name:  EDE ROBLERO     Date:  2017  Address:  [facility] [place] [address]  Home: [phone]  :  1955 AGE:  62 y.o.        RECORDS OBTAINED:  Patients Oncology Record is located in [facility]

## 2017-08-30 ENCOUNTER — HOSPITAL ENCOUNTER (OUTPATIENT)
Dept: ONCOLOGY | Facility: CLINIC | Age: 62
Setting detail: INFUSION SERIES
Discharge: HOME | End: 2017-08-30
Attending: NURSE PRACTITIONER | Admitting: NURSE PRACTITIONER

## 2017-09-05 ENCOUNTER — HOSPITAL ENCOUNTER (OUTPATIENT)
Dept: PAIN MEDICINE | Facility: HOSPITAL | Age: 62
Discharge: HOME | End: 2017-09-05
Attending: PHYSICAL MEDICINE & REHABILITATION | Admitting: PHYSICAL MEDICINE & REHABILITATION

## 2017-09-06 ENCOUNTER — HOSPITAL ENCOUNTER (OUTPATIENT)
Dept: ONCOLOGY | Facility: CLINIC | Age: 62
Setting detail: INFUSION SERIES
Discharge: HOME | End: 2017-09-06
Attending: NURSE PRACTITIONER | Admitting: NURSE PRACTITIONER

## 2017-09-06 ENCOUNTER — CLINICAL SUPPORT (OUTPATIENT)
Dept: ONCOLOGY | Facility: HOSPITAL | Age: 62
End: 2017-09-06

## 2017-09-06 NOTE — PROGRESS NOTES
PATIENTS ONCOLOGY RECORD LOCATED IN [facility]      Subjective     Name:  EDE ROBLERO     Date:  2017  Address:  [facility] [place] [address]  Home: [phone]  :  1955 AGE:  62 y.o.        RECORDS OBTAINED:  Patients Oncology Record is located in [facility]

## 2017-09-13 ENCOUNTER — CLINICAL SUPPORT (OUTPATIENT)
Dept: ONCOLOGY | Facility: HOSPITAL | Age: 62
End: 2017-09-13

## 2017-09-13 ENCOUNTER — HOSPITAL ENCOUNTER (OUTPATIENT)
Dept: ONCOLOGY | Facility: CLINIC | Age: 62
Setting detail: INFUSION SERIES
Discharge: HOME | End: 2017-09-13
Attending: NURSE PRACTITIONER | Admitting: NURSE PRACTITIONER

## 2017-09-13 NOTE — PROGRESS NOTES
PATIENTS ONCOLOGY RECORD LOCATED IN [facility]      Subjective     Name:  EDE ROBLERO     Date:  2017  Address:  [facility] [place] [address]  Home: [phone]  :  1955 AGE:  62 y.o.        RECORDS OBTAINED:  Patients Oncology Record is located in [facility]

## 2017-09-22 ENCOUNTER — HOSPITAL ENCOUNTER (OUTPATIENT)
Dept: ONCOLOGY | Facility: CLINIC | Age: 62
Setting detail: INFUSION SERIES
Discharge: HOME | End: 2017-09-22
Attending: INTERNAL MEDICINE | Admitting: INTERNAL MEDICINE

## 2017-09-22 ENCOUNTER — HOSPITAL ENCOUNTER (OUTPATIENT)
Dept: ONCOLOGY | Facility: HOSPITAL | Age: 62
Discharge: HOME | End: 2017-09-22
Attending: INTERNAL MEDICINE | Admitting: INTERNAL MEDICINE

## 2017-09-22 ENCOUNTER — CLINICAL SUPPORT (OUTPATIENT)
Dept: ONCOLOGY | Facility: HOSPITAL | Age: 62
End: 2017-09-22

## 2017-09-22 NOTE — PROGRESS NOTES
PATIENTS ONCOLOGY RECORD LOCATED IN [facility]      Subjective     Name:  EDE ROBLERO     Date:  2017  Address:  [facility] [place] [address]  Home: [phone]  :  1955 AGE:  62 y.o.        RECORDS OBTAINED:  Patients Oncology Record is located in [facility]

## 2017-09-25 ENCOUNTER — HOSPITAL ENCOUNTER (OUTPATIENT)
Dept: ONCOLOGY | Facility: CLINIC | Age: 62
Setting detail: INFUSION SERIES
Discharge: HOME | End: 2017-09-25
Attending: NURSE PRACTITIONER | Admitting: NURSE PRACTITIONER

## 2017-09-25 ENCOUNTER — CLINICAL SUPPORT (OUTPATIENT)
Dept: ONCOLOGY | Facility: HOSPITAL | Age: 62
End: 2017-09-25

## 2017-09-25 NOTE — PROGRESS NOTES
PATIENTS ONCOLOGY RECORD LOCATED IN [facility]      Subjective     Name:  EDE ROBLERO     Date:  2017  Address:  [facility] [place] [address]  Home: [phone]  :  1955 AGE:  62 y.o.        RECORDS OBTAINED:  Patients Oncology Record is located in [facility]

## 2017-10-02 ENCOUNTER — HOSPITAL ENCOUNTER (OUTPATIENT)
Dept: ONCOLOGY | Facility: CLINIC | Age: 62
Setting detail: INFUSION SERIES
Discharge: HOME | End: 2017-10-02
Attending: NURSE PRACTITIONER | Admitting: NURSE PRACTITIONER

## 2017-10-12 ENCOUNTER — HOSPITAL ENCOUNTER (OUTPATIENT)
Dept: ONCOLOGY | Facility: HOSPITAL | Age: 62
Discharge: HOME | End: 2017-10-12
Attending: INTERNAL MEDICINE | Admitting: INTERNAL MEDICINE

## 2017-10-16 ENCOUNTER — HOSPITAL ENCOUNTER (OUTPATIENT)
Dept: ONCOLOGY | Facility: CLINIC | Age: 62
Setting detail: INFUSION SERIES
Discharge: HOME | End: 2017-10-16
Attending: NURSE PRACTITIONER | Admitting: NURSE PRACTITIONER

## 2017-10-16 ENCOUNTER — CLINICAL SUPPORT (OUTPATIENT)
Dept: ONCOLOGY | Facility: HOSPITAL | Age: 62
End: 2017-10-16

## 2017-10-16 NOTE — PROGRESS NOTES
PATIENTS ONCOLOGY RECORD LOCATED IN [facility]      Subjective     Name:  EDE ROBLERO     Date:  10/16/2017  Address:  [facility] [place] [address]  Home: [phone]  :  1955 AGE:  62 y.o.        RECORDS OBTAINED:  Patients Oncology Record is located in [facility]

## 2017-10-23 ENCOUNTER — HOSPITAL ENCOUNTER (OUTPATIENT)
Dept: ONCOLOGY | Facility: CLINIC | Age: 62
Setting detail: INFUSION SERIES
Discharge: HOME | End: 2017-10-23
Attending: NURSE PRACTITIONER | Admitting: NURSE PRACTITIONER

## 2017-10-30 ENCOUNTER — CLINICAL SUPPORT (OUTPATIENT)
Dept: ONCOLOGY | Facility: HOSPITAL | Age: 62
End: 2017-10-30

## 2017-10-30 ENCOUNTER — HOSPITAL ENCOUNTER (OUTPATIENT)
Dept: ONCOLOGY | Facility: CLINIC | Age: 62
Setting detail: INFUSION SERIES
Discharge: HOME | End: 2017-10-30
Attending: NURSE PRACTITIONER | Admitting: NURSE PRACTITIONER

## 2017-11-03 ENCOUNTER — HOSPITAL ENCOUNTER (OUTPATIENT)
Dept: FAMILY MEDICINE CLINIC | Facility: CLINIC | Age: 62
Setting detail: SPECIMEN
Discharge: HOME | End: 2017-11-03
Attending: FAMILY MEDICINE | Admitting: FAMILY MEDICINE

## 2017-11-03 LAB
ALBUMIN SERPL-MCNC: 3.2 G/DL (ref 3.5–4.8)
ALBUMIN/GLOB SERPL: 1.2 {RATIO} (ref 1–1.7)
ALP SERPL-CCNC: 70 IU/L (ref 32–91)
ALT SERPL-CCNC: 20 IU/L (ref 14–54)
ANION GAP SERPL CALC-SCNC: 10.8 MMOL/L (ref 10–20)
AST SERPL-CCNC: 45 IU/L (ref 15–41)
BILIRUB SERPL-MCNC: 1.1 MG/DL (ref 0.3–1.2)
BUN SERPL-MCNC: 17 MG/DL (ref 8–20)
BUN/CREAT SERPL: 21.3 (ref 5.4–26.2)
CALCIUM SERPL-MCNC: 9.3 MG/DL (ref 8.9–10.3)
CHLORIDE SERPL-SCNC: 107 MMOL/L (ref 101–111)
CHOLEST SERPL-MCNC: 198 MG/DL
CHOLEST/HDLC SERPL: 4.1 {RATIO}
CONV CO2: 27 MMOL/L (ref 22–32)
CONV LDL CHOLESTEROL DIRECT: 133 MG/DL (ref 0–100)
CONV TOTAL PROTEIN: 5.9 G/DL (ref 6.1–7.9)
CREAT UR-MCNC: 0.8 MG/DL (ref 0.4–1)
GLOBULIN UR ELPH-MCNC: 2.7 G/DL (ref 2.5–3.8)
GLUCOSE SERPL-MCNC: 81 MG/DL (ref 65–99)
HDLC SERPL-MCNC: 48 MG/DL
LDLC/HDLC SERPL: 2.8 {RATIO}
LIPID INTERPRETATION: ABNORMAL
POTASSIUM SERPL-SCNC: 4.8 MMOL/L (ref 3.6–5.1)
SODIUM SERPL-SCNC: 140 MMOL/L (ref 136–144)
TRIGL SERPL-MCNC: 112 MG/DL
VLDLC SERPL CALC-MCNC: 16.8 MG/DL

## 2017-11-06 ENCOUNTER — CLINICAL SUPPORT (OUTPATIENT)
Dept: ONCOLOGY | Facility: HOSPITAL | Age: 62
End: 2017-11-06

## 2017-11-06 ENCOUNTER — HOSPITAL ENCOUNTER (OUTPATIENT)
Dept: ONCOLOGY | Facility: CLINIC | Age: 62
Setting detail: INFUSION SERIES
Discharge: HOME | End: 2017-11-06
Attending: NURSE PRACTITIONER | Admitting: NURSE PRACTITIONER

## 2017-11-06 NOTE — PROGRESS NOTES
PATIENTS ONCOLOGY RECORD LOCATED IN [facility]      Subjective     Name:  EDE ROBLERO     Date:  2017  Address:  [facility] [place] [address]  Home: [phone]  :  1955 AGE:  62 y.o.        RECORDS OBTAINED:  Patients Oncology Record is located in [facility]

## 2017-11-09 ENCOUNTER — HOSPITAL ENCOUNTER (OUTPATIENT)
Dept: PAIN MEDICINE | Facility: HOSPITAL | Age: 62
Discharge: HOME | End: 2017-11-09
Attending: PHYSICAL MEDICINE & REHABILITATION | Admitting: PHYSICAL MEDICINE & REHABILITATION

## 2017-11-20 ENCOUNTER — HOSPITAL ENCOUNTER (OUTPATIENT)
Dept: ONCOLOGY | Facility: HOSPITAL | Age: 62
Discharge: HOME | End: 2017-11-20
Attending: INTERNAL MEDICINE | Admitting: INTERNAL MEDICINE

## 2017-11-20 LAB
ALBUMIN SERPL-MCNC: 3.3 G/DL (ref 3.5–4.8)
ALBUMIN/GLOB SERPL: 1.1 {RATIO} (ref 1–1.7)
ALP SERPL-CCNC: 99 IU/L (ref 32–91)
ALT SERPL-CCNC: 11 IU/L (ref 14–54)
ANION GAP SERPL CALC-SCNC: 12.1 MMOL/L (ref 10–20)
AST SERPL-CCNC: 26 IU/L (ref 15–41)
BILIRUB SERPL-MCNC: 0.6 MG/DL (ref 0.3–1.2)
BUN SERPL-MCNC: 17 MG/DL (ref 8–20)
BUN/CREAT SERPL: 17 (ref 5.4–26.2)
CALCIUM SERPL-MCNC: 8.9 MG/DL (ref 8.9–10.3)
CHLORIDE SERPL-SCNC: 99 MMOL/L (ref 101–111)
CONV CO2: 27 MMOL/L (ref 22–32)
CONV TOTAL PROTEIN: 6.2 G/DL (ref 6.1–7.9)
CREAT UR-MCNC: 1 MG/DL (ref 0.4–1)
GLOBULIN UR ELPH-MCNC: 2.9 G/DL (ref 2.5–3.8)
GLUCOSE SERPL-MCNC: 81 MG/DL (ref 65–99)
MAGNESIUM SERPL-MCNC: 1.3 MG/DL (ref 1.8–2.5)
POTASSIUM SERPL-SCNC: 3.1 MMOL/L (ref 3.6–5.1)
SODIUM SERPL-SCNC: 135 MMOL/L (ref 136–144)

## 2017-11-21 ENCOUNTER — CLINICAL SUPPORT (OUTPATIENT)
Dept: ONCOLOGY | Facility: HOSPITAL | Age: 62
End: 2017-11-21

## 2017-11-21 ENCOUNTER — HOSPITAL ENCOUNTER (OUTPATIENT)
Dept: ONCOLOGY | Facility: HOSPITAL | Age: 62
Discharge: HOME | End: 2017-11-21
Attending: INTERNAL MEDICINE | Admitting: INTERNAL MEDICINE

## 2017-11-21 ENCOUNTER — HOSPITAL ENCOUNTER (OUTPATIENT)
Dept: ONCOLOGY | Facility: CLINIC | Age: 62
Setting detail: INFUSION SERIES
Discharge: HOME | End: 2017-11-21
Attending: INTERNAL MEDICINE | Admitting: INTERNAL MEDICINE

## 2017-11-21 LAB
ALBUMIN SERPL-MCNC: 3.4 G/DL (ref 3.5–4.8)
ALBUMIN/GLOB SERPL: 1.3 {RATIO} (ref 1–1.7)
ALP SERPL-CCNC: 97 IU/L (ref 32–91)
ALT SERPL-CCNC: 12 IU/L (ref 14–54)
ANION GAP SERPL CALC-SCNC: 10.1 MMOL/L (ref 10–20)
AST SERPL-CCNC: 30 IU/L (ref 15–41)
BILIRUB SERPL-MCNC: 0.5 MG/DL (ref 0.3–1.2)
BUN SERPL-MCNC: 14 MG/DL (ref 8–20)
BUN/CREAT SERPL: 14 (ref 5.4–26.2)
CALCIUM SERPL-MCNC: 9.4 MG/DL (ref 8.9–10.3)
CHLORIDE SERPL-SCNC: 101 MMOL/L (ref 101–111)
CONV CO2: 29 MMOL/L (ref 22–32)
CONV TOTAL PROTEIN: 6.1 G/DL (ref 6.1–7.9)
CREAT UR-MCNC: 1 MG/DL (ref 0.4–1)
GLOBULIN UR ELPH-MCNC: 2.7 G/DL (ref 2.5–3.8)
GLUCOSE SERPL-MCNC: 86 MG/DL (ref 65–99)
POTASSIUM SERPL-SCNC: 3.1 MMOL/L (ref 3.6–5.1)
SODIUM SERPL-SCNC: 137 MMOL/L (ref 136–144)

## 2017-11-21 NOTE — PROGRESS NOTES
PATIENTS ONCOLOGY RECORD LOCATED IN [facility]      Subjective     Name:  EDE ROBLERO     Date:  2017  Address:  [facility] [place] [address]  Home: [phone]  :  1955 AGE:  62 y.o.        RECORDS OBTAINED:  Patients Oncology Record is located in [facility]

## 2017-11-22 LAB — CANCER AG125 SERPL-ACNC: 17 U/ML (ref 0–35)

## 2017-12-04 ENCOUNTER — HOSPITAL ENCOUNTER (OUTPATIENT)
Dept: ONCOLOGY | Facility: HOSPITAL | Age: 62
Discharge: HOME | End: 2017-12-04
Attending: INTERNAL MEDICINE | Admitting: INTERNAL MEDICINE

## 2017-12-04 LAB — MAGNESIUM SERPL-MCNC: 1.4 MG/DL (ref 1.8–2.5)

## 2017-12-11 ENCOUNTER — HOSPITAL ENCOUNTER (OUTPATIENT)
Dept: ONCOLOGY | Facility: HOSPITAL | Age: 62
Discharge: HOME | End: 2017-12-11
Attending: INTERNAL MEDICINE | Admitting: INTERNAL MEDICINE

## 2017-12-11 LAB — MAGNESIUM SERPL-MCNC: 1.1 MG/DL (ref 1.8–2.5)

## 2017-12-20 ENCOUNTER — HOSPITAL ENCOUNTER (OUTPATIENT)
Dept: ONCOLOGY | Facility: HOSPITAL | Age: 62
Discharge: HOME | End: 2017-12-20
Attending: INTERNAL MEDICINE | Admitting: INTERNAL MEDICINE

## 2017-12-21 LAB — GLUCOSE BLD-MCNC: 87 MG/DL (ref 70–105)

## 2017-12-22 ENCOUNTER — HOSPITAL ENCOUNTER (OUTPATIENT)
Dept: CT IMAGING | Facility: HOSPITAL | Age: 62
Discharge: HOME | End: 2017-12-22
Attending: INTERNAL MEDICINE | Admitting: INTERNAL MEDICINE

## 2017-12-22 LAB — CREAT BLDA-MCNC: 0.8 MG/DL (ref 0.6–1.3)

## 2017-12-26 ENCOUNTER — CLINICAL SUPPORT (OUTPATIENT)
Dept: ONCOLOGY | Facility: HOSPITAL | Age: 62
End: 2017-12-26

## 2017-12-26 ENCOUNTER — HOSPITAL ENCOUNTER (OUTPATIENT)
Dept: ONCOLOGY | Facility: CLINIC | Age: 62
Setting detail: INFUSION SERIES
Discharge: HOME | End: 2017-12-26
Attending: INTERNAL MEDICINE | Admitting: INTERNAL MEDICINE

## 2017-12-26 ENCOUNTER — HOSPITAL ENCOUNTER (OUTPATIENT)
Dept: ONCOLOGY | Facility: HOSPITAL | Age: 62
Discharge: HOME | End: 2017-12-26
Attending: INTERNAL MEDICINE | Admitting: INTERNAL MEDICINE

## 2017-12-26 NOTE — PROGRESS NOTES
PATIENTS ONCOLOGY RECORD LOCATED IN [facility]      Subjective     Name:  EDE ROBLERO     Date:  2017  Address:  [facility] [place] [address]  Home: [phone]  :  1955 AGE:  62 y.o.        RECORDS OBTAINED:  Patients Oncology Record is located in [facility]

## 2017-12-29 ENCOUNTER — HOSPITAL ENCOUNTER (OUTPATIENT)
Dept: ONCOLOGY | Facility: HOSPITAL | Age: 62
Discharge: HOME | End: 2017-12-29
Attending: INTERNAL MEDICINE | Admitting: INTERNAL MEDICINE

## 2017-12-29 LAB
ALBUMIN SERPL-MCNC: 3 G/DL (ref 3.5–4.8)
ALBUMIN/GLOB SERPL: 1.1 {RATIO} (ref 1–1.7)
ALP SERPL-CCNC: 71 IU/L (ref 32–91)
ALT SERPL-CCNC: 14 IU/L (ref 14–54)
ANION GAP SERPL CALC-SCNC: 12.1 MMOL/L (ref 10–20)
AST SERPL-CCNC: 41 IU/L (ref 15–41)
BILIRUB SERPL-MCNC: 0.4 MG/DL (ref 0.3–1.2)
BUN SERPL-MCNC: 11 MG/DL (ref 8–20)
BUN/CREAT SERPL: 11 (ref 5.4–26.2)
CALCIUM SERPL-MCNC: 9 MG/DL (ref 8.9–10.3)
CHLORIDE SERPL-SCNC: 103 MMOL/L (ref 101–111)
CONV CO2: 24 MMOL/L (ref 22–32)
CONV TOTAL PROTEIN: 5.7 G/DL (ref 6.1–7.9)
CREAT UR-MCNC: 1 MG/DL (ref 0.4–1)
GLOBULIN UR ELPH-MCNC: 2.7 G/DL (ref 2.5–3.8)
GLUCOSE SERPL-MCNC: 131 MG/DL (ref 65–99)
MAGNESIUM SERPL-MCNC: 1.2 MG/DL (ref 1.8–2.5)
POTASSIUM SERPL-SCNC: 3.1 MMOL/L (ref 3.6–5.1)
SODIUM SERPL-SCNC: 136 MMOL/L (ref 136–144)

## 2018-01-05 ENCOUNTER — HOSPITAL ENCOUNTER (OUTPATIENT)
Dept: ONCOLOGY | Facility: HOSPITAL | Age: 63
Discharge: HOME | End: 2018-01-05
Attending: INTERNAL MEDICINE | Admitting: INTERNAL MEDICINE

## 2018-01-06 ENCOUNTER — HOSPITAL ENCOUNTER (OUTPATIENT)
Dept: MRI IMAGING | Facility: HOSPITAL | Age: 63
Discharge: HOME | End: 2018-01-06
Attending: FAMILY MEDICINE | Admitting: FAMILY MEDICINE

## 2018-01-16 ENCOUNTER — HOSPITAL ENCOUNTER (OUTPATIENT)
Dept: PAIN MEDICINE | Facility: HOSPITAL | Age: 63
Discharge: HOME | End: 2018-01-16
Attending: PHYSICAL MEDICINE & REHABILITATION | Admitting: PHYSICAL MEDICINE & REHABILITATION

## 2018-01-22 ENCOUNTER — CLINICAL SUPPORT (OUTPATIENT)
Dept: ONCOLOGY | Facility: HOSPITAL | Age: 63
End: 2018-01-22

## 2018-01-22 ENCOUNTER — HOSPITAL ENCOUNTER (OUTPATIENT)
Dept: ONCOLOGY | Facility: CLINIC | Age: 63
Setting detail: INFUSION SERIES
Discharge: HOME | End: 2018-01-22
Attending: INTERNAL MEDICINE | Admitting: INTERNAL MEDICINE

## 2018-01-22 ENCOUNTER — HOSPITAL ENCOUNTER (OUTPATIENT)
Dept: ONCOLOGY | Facility: HOSPITAL | Age: 63
Discharge: HOME | End: 2018-01-22
Attending: INTERNAL MEDICINE | Admitting: INTERNAL MEDICINE

## 2018-01-22 LAB — MAGNESIUM SERPL-MCNC: 1.6 MG/DL (ref 1.8–2.5)

## 2018-01-22 NOTE — PROGRESS NOTES
PATIENTS ONCOLOGY RECORD LOCATED IN [facility]      Subjective     Name:  EDE ROBLERO     Date:  2018  Address:  [facility] [place] [address]  Home: [phone]  :  1955 AGE:  62 y.o.        RECORDS OBTAINED:  Patients Oncology Record is located in [facility]

## 2018-02-05 ENCOUNTER — CLINICAL SUPPORT (OUTPATIENT)
Dept: ONCOLOGY | Facility: HOSPITAL | Age: 63
End: 2018-02-05

## 2018-02-05 ENCOUNTER — HOSPITAL ENCOUNTER (OUTPATIENT)
Dept: ONCOLOGY | Facility: CLINIC | Age: 63
Setting detail: INFUSION SERIES
Discharge: HOME | End: 2018-02-05
Attending: INTERNAL MEDICINE | Admitting: INTERNAL MEDICINE

## 2018-02-05 NOTE — PROGRESS NOTES
PATIENTS ONCOLOGY RECORD LOCATED IN [facility]      Subjective     Name:  EDE ROBLERO     Date:  2018  Address:  [facility] [place] [address]  Home: [phone]  :  1955 AGE:  62 y.o.        RECORDS OBTAINED:  Patients Oncology Record is located in [facility]

## 2018-02-06 ENCOUNTER — HOSPITAL ENCOUNTER (OUTPATIENT)
Dept: PAIN MEDICINE | Facility: HOSPITAL | Age: 63
Discharge: HOME | End: 2018-02-06
Attending: PHYSICAL MEDICINE & REHABILITATION | Admitting: PHYSICAL MEDICINE & REHABILITATION

## 2018-02-12 ENCOUNTER — HOSPITAL ENCOUNTER (OUTPATIENT)
Dept: ONCOLOGY | Facility: CLINIC | Age: 63
Setting detail: INFUSION SERIES
Discharge: HOME | End: 2018-02-12
Attending: INTERNAL MEDICINE | Admitting: INTERNAL MEDICINE

## 2018-02-12 ENCOUNTER — CLINICAL SUPPORT (OUTPATIENT)
Dept: ONCOLOGY | Facility: HOSPITAL | Age: 63
End: 2018-02-12

## 2018-02-12 NOTE — PROGRESS NOTES
PATIENTS ONCOLOGY RECORD LOCATED IN [facility]      Subjective     Name:  EDE ROBLERO     Date:  2018  Address:  [facility] [place] [address]  Home: [phone]  :  1955 AGE:  62 y.o.        RECORDS OBTAINED:  Patients Oncology Record is located in [facility]

## 2018-02-19 ENCOUNTER — HOSPITAL ENCOUNTER (OUTPATIENT)
Dept: ONCOLOGY | Facility: CLINIC | Age: 63
Setting detail: INFUSION SERIES
Discharge: HOME | End: 2018-02-19
Attending: INTERNAL MEDICINE | Admitting: INTERNAL MEDICINE

## 2018-02-19 ENCOUNTER — CLINICAL SUPPORT (OUTPATIENT)
Dept: ONCOLOGY | Facility: HOSPITAL | Age: 63
End: 2018-02-19

## 2018-02-19 NOTE — PROGRESS NOTES
PATIENTS ONCOLOGY RECORD LOCATED IN [facility]      Subjective     Name:  EDE ROBLERO     Date:  2018  Address:  [facility] [place] [address]  Home: [phone]  :  1955 AGE:  62 y.o.        RECORDS OBTAINED:  Patients Oncology Record is located in [facility]

## 2018-02-26 ENCOUNTER — CLINICAL SUPPORT (OUTPATIENT)
Dept: ONCOLOGY | Facility: HOSPITAL | Age: 63
End: 2018-02-26

## 2018-02-26 ENCOUNTER — HOSPITAL ENCOUNTER (OUTPATIENT)
Dept: ONCOLOGY | Facility: HOSPITAL | Age: 63
Discharge: HOME | End: 2018-02-26
Attending: NURSE PRACTITIONER | Admitting: NURSE PRACTITIONER

## 2018-02-26 ENCOUNTER — HOSPITAL ENCOUNTER (OUTPATIENT)
Dept: ONCOLOGY | Facility: CLINIC | Age: 63
Setting detail: INFUSION SERIES
Discharge: HOME | End: 2018-02-26
Attending: NURSE PRACTITIONER | Admitting: NURSE PRACTITIONER

## 2018-02-26 LAB
ALBUMIN SERPL-MCNC: 3.3 G/DL (ref 3.5–4.8)
ALBUMIN/GLOB SERPL: 1 {RATIO} (ref 1–1.7)
ALP SERPL-CCNC: 82 IU/L (ref 32–91)
ALT SERPL-CCNC: 10 IU/L (ref 14–54)
ANION GAP SERPL CALC-SCNC: 12.3 MMOL/L (ref 10–20)
AST SERPL-CCNC: 23 IU/L (ref 15–41)
BILIRUB SERPL-MCNC: 0.6 MG/DL (ref 0.3–1.2)
BUN SERPL-MCNC: 16 MG/DL (ref 8–20)
BUN/CREAT SERPL: 16 (ref 5.4–26.2)
CALCIUM SERPL-MCNC: 9 MG/DL (ref 8.9–10.3)
CHLORIDE SERPL-SCNC: 102 MMOL/L (ref 101–111)
CONV CO2: 25 MMOL/L (ref 22–32)
CONV TOTAL PROTEIN: 6.6 G/DL (ref 6.1–7.9)
CREAT UR-MCNC: 1 MG/DL (ref 0.4–1)
GLOBULIN UR ELPH-MCNC: 3.3 G/DL (ref 2.5–3.8)
GLUCOSE SERPL-MCNC: 97 MG/DL (ref 65–99)
MAGNESIUM SERPL-MCNC: 1.4 MG/DL (ref 1.8–2.5)
POTASSIUM SERPL-SCNC: 3.3 MMOL/L (ref 3.6–5.1)
SODIUM SERPL-SCNC: 136 MMOL/L (ref 136–144)

## 2018-02-26 NOTE — PROGRESS NOTES
PATIENTS ONCOLOGY RECORD LOCATED IN [facility]      Subjective     Name:  EDE ROBLERO     Date:  2018  Address:  [facility] [place] [address]  Home: [phone]  :  1955 AGE:  62 y.o.        RECORDS OBTAINED:  Patients Oncology Record is located in [facility]

## 2018-03-05 ENCOUNTER — CLINICAL SUPPORT (OUTPATIENT)
Dept: ONCOLOGY | Facility: HOSPITAL | Age: 63
End: 2018-03-05

## 2018-03-05 ENCOUNTER — HOSPITAL ENCOUNTER (OUTPATIENT)
Dept: ONCOLOGY | Facility: HOSPITAL | Age: 63
Discharge: HOME | End: 2018-03-05
Attending: INTERNAL MEDICINE | Admitting: INTERNAL MEDICINE

## 2018-03-05 ENCOUNTER — HOSPITAL ENCOUNTER (OUTPATIENT)
Dept: ONCOLOGY | Facility: CLINIC | Age: 63
Setting detail: INFUSION SERIES
Discharge: HOME | End: 2018-03-05
Attending: INTERNAL MEDICINE | Admitting: INTERNAL MEDICINE

## 2018-03-05 NOTE — PROGRESS NOTES
PATIENTS ONCOLOGY RECORD LOCATED IN [facility]      Subjective     Name:  EDE ROBLERO     Date:  2018  Address:  [facility] [place] [address]  Home: [phone]  :  1955 AGE:  62 y.o.        RECORDS OBTAINED:  Patients Oncology Record is located in [facility]

## 2018-03-12 ENCOUNTER — CLINICAL SUPPORT (OUTPATIENT)
Dept: ONCOLOGY | Facility: HOSPITAL | Age: 63
End: 2018-03-12

## 2018-03-12 ENCOUNTER — HOSPITAL ENCOUNTER (OUTPATIENT)
Dept: ONCOLOGY | Facility: CLINIC | Age: 63
Setting detail: INFUSION SERIES
Discharge: HOME | End: 2018-03-12
Attending: INTERNAL MEDICINE | Admitting: INTERNAL MEDICINE

## 2018-03-12 ENCOUNTER — HOSPITAL ENCOUNTER (OUTPATIENT)
Dept: ONCOLOGY | Facility: HOSPITAL | Age: 63
Discharge: HOME | End: 2018-03-12
Attending: INTERNAL MEDICINE | Admitting: INTERNAL MEDICINE

## 2018-03-12 NOTE — PROGRESS NOTES
PATIENTS ONCOLOGY RECORD LOCATED IN [facility]      Subjective     Name:  EDE ROBLERO     Date:  2018  Address:  [facility] [place] [address]  Home: [phone]  :  1955 AGE:  62 y.o.        RECORDS OBTAINED:  Patients Oncology Record is located in [facility]

## 2018-03-19 ENCOUNTER — HOSPITAL ENCOUNTER (OUTPATIENT)
Dept: ONCOLOGY | Facility: CLINIC | Age: 63
Setting detail: INFUSION SERIES
Discharge: HOME | End: 2018-03-19
Attending: INTERNAL MEDICINE | Admitting: INTERNAL MEDICINE

## 2018-03-19 ENCOUNTER — HOSPITAL ENCOUNTER (OUTPATIENT)
Dept: ONCOLOGY | Facility: HOSPITAL | Age: 63
Discharge: HOME | End: 2018-03-19
Attending: INTERNAL MEDICINE | Admitting: INTERNAL MEDICINE

## 2018-03-19 ENCOUNTER — CLINICAL SUPPORT (OUTPATIENT)
Dept: ONCOLOGY | Facility: HOSPITAL | Age: 63
End: 2018-03-19

## 2018-03-19 NOTE — PROGRESS NOTES
PATIENTS ONCOLOGY RECORD LOCATED IN [facility]      Subjective     Name:  EDE ROBLERO     Date:  2018  Address:  [facility] [place] [address]  Home: [phone]  :  1955 AGE:  62 y.o.        RECORDS OBTAINED:  Patients Oncology Record is located in [facility]

## 2018-03-26 ENCOUNTER — HOSPITAL ENCOUNTER (OUTPATIENT)
Dept: ONCOLOGY | Facility: HOSPITAL | Age: 63
Discharge: HOME | End: 2018-03-26
Attending: INTERNAL MEDICINE | Admitting: INTERNAL MEDICINE

## 2018-03-26 ENCOUNTER — HOSPITAL ENCOUNTER (OUTPATIENT)
Dept: ONCOLOGY | Facility: CLINIC | Age: 63
Setting detail: INFUSION SERIES
Discharge: HOME | End: 2018-03-26
Attending: INTERNAL MEDICINE | Admitting: INTERNAL MEDICINE

## 2018-03-26 ENCOUNTER — CLINICAL SUPPORT (OUTPATIENT)
Dept: ONCOLOGY | Facility: HOSPITAL | Age: 63
End: 2018-03-26

## 2018-03-26 NOTE — PROGRESS NOTES
PATIENTS ONCOLOGY RECORD LOCATED IN [facility]      Subjective     Name:  EDE ROBLERO     Date:  2018  Address:  [facility] [place] [address]  Home: [phone]  :  1955 AGE:  62 y.o.        RECORDS OBTAINED:  Patients Oncology Record is located in [facility]

## 2018-03-29 ENCOUNTER — CLINICAL SUPPORT (OUTPATIENT)
Dept: ONCOLOGY | Facility: HOSPITAL | Age: 63
End: 2018-03-29

## 2018-03-29 ENCOUNTER — HOSPITAL ENCOUNTER (OUTPATIENT)
Dept: ONCOLOGY | Facility: CLINIC | Age: 63
Setting detail: INFUSION SERIES
Discharge: HOME | End: 2018-03-29
Attending: INTERNAL MEDICINE | Admitting: INTERNAL MEDICINE

## 2018-03-29 NOTE — PROGRESS NOTES
PATIENTS ONCOLOGY RECORD LOCATED IN [facility]      Subjective     Name:  EDE ROBLERO     Date:  2018  Address:  [facility] [place] [address]  Home: [phone]  :  1955 AGE:  62 y.o.        RECORDS OBTAINED:  Patients Oncology Record is located in [facility]

## 2018-04-02 ENCOUNTER — CLINICAL SUPPORT (OUTPATIENT)
Dept: ONCOLOGY | Facility: HOSPITAL | Age: 63
End: 2018-04-02

## 2018-04-02 ENCOUNTER — HOSPITAL ENCOUNTER (OUTPATIENT)
Dept: ONCOLOGY | Facility: CLINIC | Age: 63
Setting detail: INFUSION SERIES
Discharge: HOME | End: 2018-04-02
Attending: INTERNAL MEDICINE | Admitting: INTERNAL MEDICINE

## 2018-04-02 ENCOUNTER — HOSPITAL ENCOUNTER (OUTPATIENT)
Dept: ONCOLOGY | Facility: HOSPITAL | Age: 63
Discharge: HOME | End: 2018-04-02
Attending: INTERNAL MEDICINE | Admitting: INTERNAL MEDICINE

## 2018-04-02 LAB — MAGNESIUM SERPL-MCNC: 1.5 MG/DL (ref 1.8–2.5)

## 2018-04-02 NOTE — PROGRESS NOTES
PATIENTS ONCOLOGY RECORD LOCATED IN [facility]      Subjective     Name:  EDE ROBLERO     Date:  2018  Address:  [facility] [place] [address]  Home: [phone]  :  1955 AGE:  62 y.o.        RECORDS OBTAINED:  Patients Oncology Record is located in [facility]

## 2018-04-09 ENCOUNTER — HOSPITAL ENCOUNTER (OUTPATIENT)
Dept: ONCOLOGY | Facility: CLINIC | Age: 63
Setting detail: INFUSION SERIES
Discharge: HOME | End: 2018-04-09
Attending: INTERNAL MEDICINE | Admitting: INTERNAL MEDICINE

## 2018-04-09 ENCOUNTER — HOSPITAL ENCOUNTER (OUTPATIENT)
Dept: ONCOLOGY | Facility: HOSPITAL | Age: 63
Discharge: HOME | End: 2018-04-09
Attending: INTERNAL MEDICINE | Admitting: INTERNAL MEDICINE

## 2018-04-09 ENCOUNTER — CLINICAL SUPPORT (OUTPATIENT)
Dept: ONCOLOGY | Facility: HOSPITAL | Age: 63
End: 2018-04-09

## 2018-04-09 LAB — MAGNESIUM SERPL-MCNC: 1.5 MG/DL (ref 1.8–2.5)

## 2018-04-09 NOTE — PROGRESS NOTES
PATIENTS ONCOLOGY RECORD LOCATED IN [facility]      Subjective     Name:  EDE ROBLERO     Date:  2018  Address:  [facility] [place] [address]  Home: [phone]  :  1955 AGE:  62 y.o.        RECORDS OBTAINED:  Patients Oncology Record is located in [facility]

## 2018-04-16 ENCOUNTER — HOSPITAL ENCOUNTER (OUTPATIENT)
Dept: ONCOLOGY | Facility: HOSPITAL | Age: 63
Discharge: HOME | End: 2018-04-16
Attending: INTERNAL MEDICINE | Admitting: INTERNAL MEDICINE

## 2018-04-16 ENCOUNTER — CLINICAL SUPPORT (OUTPATIENT)
Dept: ONCOLOGY | Facility: HOSPITAL | Age: 63
End: 2018-04-16

## 2018-04-16 ENCOUNTER — HOSPITAL ENCOUNTER (OUTPATIENT)
Dept: ONCOLOGY | Facility: CLINIC | Age: 63
Setting detail: INFUSION SERIES
Discharge: HOME | End: 2018-04-16
Attending: INTERNAL MEDICINE | Admitting: INTERNAL MEDICINE

## 2018-04-16 LAB — MAGNESIUM SERPL-MCNC: 1.7 MG/DL (ref 1.8–2.5)

## 2018-04-16 NOTE — PROGRESS NOTES
PATIENTS ONCOLOGY RECORD LOCATED IN [facility]      Subjective     Name:  EDE ROBLERO     Date:  2018  Address:  [facility] [place] [address]  Home: [phone]  :  1955 AGE:  62 y.o.        RECORDS OBTAINED:  Patients Oncology Record is located in [facility]

## 2018-04-23 ENCOUNTER — HOSPITAL ENCOUNTER (OUTPATIENT)
Dept: ONCOLOGY | Facility: HOSPITAL | Age: 63
Discharge: HOME | End: 2018-04-23
Attending: INTERNAL MEDICINE | Admitting: INTERNAL MEDICINE

## 2018-04-23 ENCOUNTER — CLINICAL SUPPORT (OUTPATIENT)
Dept: ONCOLOGY | Facility: HOSPITAL | Age: 63
End: 2018-04-23

## 2018-04-23 ENCOUNTER — HOSPITAL ENCOUNTER (OUTPATIENT)
Dept: PAIN MEDICINE | Facility: HOSPITAL | Age: 63
Discharge: HOME | End: 2018-04-23
Attending: PHYSICAL MEDICINE & REHABILITATION | Admitting: PHYSICAL MEDICINE & REHABILITATION

## 2018-04-23 ENCOUNTER — HOSPITAL ENCOUNTER (OUTPATIENT)
Dept: ONCOLOGY | Facility: CLINIC | Age: 63
Setting detail: INFUSION SERIES
Discharge: HOME | End: 2018-04-23
Attending: INTERNAL MEDICINE | Admitting: INTERNAL MEDICINE

## 2018-04-23 LAB — CREAT BLDA-MCNC: 0.9 MG/DL (ref 0.6–1.3)

## 2018-04-23 NOTE — PROGRESS NOTES
PATIENTS ONCOLOGY RECORD LOCATED IN [facility]      Subjective     Name:  EDE ROBLERO     Date:  2018  Address:  [facility] [place] [address]  Home: [phone]  :  1955 AGE:  62 y.o.        RECORDS OBTAINED:  Patients Oncology Record is located in [facility]

## 2018-04-24 LAB
ALBUMIN SERPL-MCNC: 3.3 G/DL (ref 3.5–4.8)
ALBUMIN/GLOB SERPL: 1 {RATIO} (ref 1–1.7)
ALP SERPL-CCNC: 96 IU/L (ref 32–91)
ALT SERPL-CCNC: 11 IU/L (ref 14–54)
ANION GAP SERPL CALC-SCNC: 11.6 MMOL/L (ref 10–20)
AST SERPL-CCNC: 26 IU/L (ref 15–41)
BILIRUB SERPL-MCNC: 0.3 MG/DL (ref 0.3–1.2)
BUN SERPL-MCNC: 16 MG/DL (ref 8–20)
BUN/CREAT SERPL: 17.8 (ref 5.4–26.2)
CALCIUM SERPL-MCNC: 9.2 MG/DL (ref 8.9–10.3)
CHLORIDE SERPL-SCNC: 107 MMOL/L (ref 101–111)
CONV CO2: 24 MMOL/L (ref 22–32)
CONV TOTAL PROTEIN: 6.5 G/DL (ref 6.1–7.9)
CREAT UR-MCNC: 0.9 MG/DL (ref 0.4–1)
GLOBULIN UR ELPH-MCNC: 3.2 G/DL (ref 2.5–3.8)
GLUCOSE SERPL-MCNC: 67 MG/DL (ref 65–99)
MAGNESIUM SERPL-MCNC: 1.8 MG/DL (ref 1.8–2.5)
POTASSIUM SERPL-SCNC: 4.6 MMOL/L (ref 3.6–5.1)
SODIUM SERPL-SCNC: 138 MMOL/L (ref 136–144)

## 2018-04-30 ENCOUNTER — CLINICAL SUPPORT (OUTPATIENT)
Dept: ONCOLOGY | Facility: HOSPITAL | Age: 63
End: 2018-04-30

## 2018-04-30 ENCOUNTER — HOSPITAL ENCOUNTER (OUTPATIENT)
Dept: ONCOLOGY | Facility: HOSPITAL | Age: 63
Discharge: HOME | End: 2018-04-30
Attending: NURSE PRACTITIONER | Admitting: NURSE PRACTITIONER

## 2018-04-30 ENCOUNTER — HOSPITAL ENCOUNTER (OUTPATIENT)
Dept: ONCOLOGY | Facility: CLINIC | Age: 63
Setting detail: INFUSION SERIES
Discharge: HOME | End: 2018-04-30
Attending: NURSE PRACTITIONER | Admitting: NURSE PRACTITIONER

## 2018-04-30 LAB — MAGNESIUM SERPL-MCNC: 1.4 MG/DL (ref 1.8–2.5)

## 2018-04-30 NOTE — PROGRESS NOTES
PATIENTS ONCOLOGY RECORD LOCATED IN [facility]      Subjective     Name:  EDE ROBLERO     Date:  2018  Address:  [facility] [place] [address]  Home: [phone]  :  1955 AGE:  62 y.o.        RECORDS OBTAINED:  Patients Oncology Record is located in [facility]

## 2018-05-07 ENCOUNTER — HOSPITAL ENCOUNTER (OUTPATIENT)
Dept: ONCOLOGY | Facility: HOSPITAL | Age: 63
Discharge: HOME | End: 2018-05-07
Attending: INTERNAL MEDICINE | Admitting: INTERNAL MEDICINE

## 2018-05-07 ENCOUNTER — HOSPITAL ENCOUNTER (OUTPATIENT)
Dept: ONCOLOGY | Facility: CLINIC | Age: 63
Setting detail: INFUSION SERIES
Discharge: HOME | End: 2018-05-07
Attending: INTERNAL MEDICINE | Admitting: INTERNAL MEDICINE

## 2018-05-07 ENCOUNTER — CLINICAL SUPPORT (OUTPATIENT)
Dept: ONCOLOGY | Facility: HOSPITAL | Age: 63
End: 2018-05-07

## 2018-05-07 LAB — MAGNESIUM SERPL-MCNC: 1.4 MG/DL (ref 1.8–2.5)

## 2018-05-07 NOTE — PROGRESS NOTES
PATIENTS ONCOLOGY RECORD LOCATED IN [facility]      Subjective     Name:  EDE ROBLERO     Date:  2018  Address:  [facility] [place] [address]  Home: [phone]  :  1955 AGE:  62 y.o.        RECORDS OBTAINED:  Patients Oncology Record is located in [facility]

## 2018-05-14 ENCOUNTER — CLINICAL SUPPORT (OUTPATIENT)
Dept: ONCOLOGY | Facility: HOSPITAL | Age: 63
End: 2018-05-14

## 2018-05-14 ENCOUNTER — HOSPITAL ENCOUNTER (OUTPATIENT)
Dept: ONCOLOGY | Facility: CLINIC | Age: 63
Setting detail: INFUSION SERIES
Discharge: HOME | End: 2018-05-14
Attending: INTERNAL MEDICINE | Admitting: INTERNAL MEDICINE

## 2018-05-14 ENCOUNTER — HOSPITAL ENCOUNTER (OUTPATIENT)
Dept: ONCOLOGY | Facility: HOSPITAL | Age: 63
Discharge: HOME | End: 2018-05-14
Attending: INTERNAL MEDICINE | Admitting: INTERNAL MEDICINE

## 2018-05-14 LAB
ALBUMIN SERPL-MCNC: 3.5 G/DL (ref 3.5–4.8)
ALBUMIN/GLOB SERPL: 1.3 {RATIO} (ref 1–1.7)
ALP SERPL-CCNC: 99 IU/L (ref 32–91)
ALT SERPL-CCNC: 10 IU/L (ref 14–54)
ANION GAP SERPL CALC-SCNC: 13.2 MMOL/L (ref 10–20)
AST SERPL-CCNC: 24 IU/L (ref 15–41)
BILIRUB SERPL-MCNC: 0.4 MG/DL (ref 0.3–1.2)
BUN SERPL-MCNC: 16 MG/DL (ref 8–20)
BUN/CREAT SERPL: 16 (ref 5.4–26.2)
CALCIUM SERPL-MCNC: 9.4 MG/DL (ref 8.9–10.3)
CHLORIDE SERPL-SCNC: 105 MMOL/L (ref 101–111)
CONV CO2: 26 MMOL/L (ref 22–32)
CONV TOTAL PROTEIN: 6.3 G/DL (ref 6.1–7.9)
CREAT BLDA-MCNC: 1 MG/DL (ref 0.6–1.3)
CREAT UR-MCNC: 1 MG/DL (ref 0.4–1)
GLOBULIN UR ELPH-MCNC: 2.8 G/DL (ref 2.5–3.8)
GLUCOSE SERPL-MCNC: 66 MG/DL (ref 65–99)
MAGNESIUM SERPL-MCNC: 1.6 MG/DL (ref 1.8–2.5)
POTASSIUM SERPL-SCNC: 4.2 MMOL/L (ref 3.6–5.1)
SODIUM SERPL-SCNC: 140 MMOL/L (ref 136–144)

## 2018-05-14 NOTE — PROGRESS NOTES
PATIENTS ONCOLOGY RECORD LOCATED IN [facility]      Subjective     Name:  EDE ROBLERO     Date:  2018  Address:  [facility] [place] [address]  Home: [phone]  :  1955 AGE:  62 y.o.        RECORDS OBTAINED:  Patients Oncology Record is located in [facility]

## 2018-05-21 ENCOUNTER — HOSPITAL ENCOUNTER (OUTPATIENT)
Dept: ONCOLOGY | Facility: CLINIC | Age: 63
Setting detail: INFUSION SERIES
Discharge: HOME | End: 2018-05-21
Attending: INTERNAL MEDICINE | Admitting: INTERNAL MEDICINE

## 2018-05-21 ENCOUNTER — CLINICAL SUPPORT (OUTPATIENT)
Dept: ONCOLOGY | Facility: HOSPITAL | Age: 63
End: 2018-05-21

## 2018-05-21 ENCOUNTER — HOSPITAL ENCOUNTER (OUTPATIENT)
Dept: ONCOLOGY | Facility: HOSPITAL | Age: 63
Discharge: HOME | End: 2018-05-21
Attending: INTERNAL MEDICINE | Admitting: INTERNAL MEDICINE

## 2018-05-21 LAB — MAGNESIUM SERPL-MCNC: 1.6 MG/DL (ref 1.8–2.5)

## 2018-05-21 NOTE — PROGRESS NOTES
PATIENTS ONCOLOGY RECORD LOCATED IN [facility]      Subjective     Name:  EDE ROBLERO     Date:  2018  Address[facility]  [facility] [place] [address]  Home: [phone]  :  1955 AGE:  62 y.o.        RECORDS OBTAINED:  Patients Oncology Record is located in [facility]

## 2018-05-25 ENCOUNTER — ON CAMPUS - OUTPATIENT (OUTPATIENT)
Dept: URBAN - METROPOLITAN AREA HOSPITAL 85 | Facility: HOSPITAL | Age: 63
End: 2018-05-25
Payer: COMMERCIAL

## 2018-05-25 DIAGNOSIS — R13.10 DYSPHAGIA, UNSPECIFIED: ICD-10-CM

## 2018-05-25 DIAGNOSIS — I50.9 HEART FAILURE, UNSPECIFIED: ICD-10-CM

## 2018-05-25 DIAGNOSIS — K92.1 MELENA: ICD-10-CM

## 2018-05-25 DIAGNOSIS — R63.4 ABNORMAL WEIGHT LOSS: ICD-10-CM

## 2018-05-25 DIAGNOSIS — K44.9 DIAPHRAGMATIC HERNIA WITHOUT OBSTRUCTION OR GANGRENE: ICD-10-CM

## 2018-05-25 DIAGNOSIS — D64.89 OTHER SPECIFIED ANEMIAS: ICD-10-CM

## 2018-05-25 DIAGNOSIS — K29.70 GASTRITIS, UNSPECIFIED, WITHOUT BLEEDING: ICD-10-CM

## 2018-05-25 DIAGNOSIS — R11.0 NAUSEA: ICD-10-CM

## 2018-05-25 DIAGNOSIS — D69.6 THROMBOCYTOPENIA, UNSPECIFIED: ICD-10-CM

## 2018-05-25 PROCEDURE — 43450 DILATE ESOPHAGUS 1/MULT PASS: CPT | Performed by: INTERNAL MEDICINE

## 2018-05-25 PROCEDURE — 99214 OFFICE O/P EST MOD 30 MIN: CPT | Mod: 25 | Performed by: NURSE PRACTITIONER

## 2018-05-25 PROCEDURE — 43239 EGD BIOPSY SINGLE/MULTIPLE: CPT | Performed by: INTERNAL MEDICINE

## 2018-05-26 ENCOUNTER — ON CAMPUS - OUTPATIENT (OUTPATIENT)
Dept: URBAN - METROPOLITAN AREA HOSPITAL 85 | Facility: HOSPITAL | Age: 63
End: 2018-05-26
Payer: COMMERCIAL

## 2018-05-26 DIAGNOSIS — D64.9 ANEMIA, UNSPECIFIED: ICD-10-CM

## 2018-05-26 DIAGNOSIS — K64.8 OTHER HEMORRHOIDS: ICD-10-CM

## 2018-05-26 DIAGNOSIS — K57.90 DIVERTICULOSIS OF INTESTINE, PART UNSPECIFIED, WITHOUT PERFO: ICD-10-CM

## 2018-05-26 PROCEDURE — 45378 DIAGNOSTIC COLONOSCOPY: CPT | Performed by: INTERNAL MEDICINE

## 2018-06-04 ENCOUNTER — CLINICAL SUPPORT (OUTPATIENT)
Dept: ONCOLOGY | Facility: HOSPITAL | Age: 63
End: 2018-06-04

## 2018-06-04 ENCOUNTER — HOSPITAL ENCOUNTER (OUTPATIENT)
Dept: ONCOLOGY | Facility: HOSPITAL | Age: 63
Discharge: HOME | End: 2018-06-04
Attending: INTERNAL MEDICINE | Admitting: INTERNAL MEDICINE

## 2018-06-04 ENCOUNTER — HOSPITAL ENCOUNTER (OUTPATIENT)
Dept: ONCOLOGY | Facility: CLINIC | Age: 63
Setting detail: INFUSION SERIES
Discharge: HOME | End: 2018-06-04
Attending: INTERNAL MEDICINE | Admitting: INTERNAL MEDICINE

## 2018-06-04 LAB
IRON SATN MFR SERPL: 20 % (ref 15–50)
IRON SERPL-MCNC: 65 UG/DL (ref 28–170)
MAGNESIUM SERPL-MCNC: 1.5 MG/DL (ref 1.8–2.5)
TIBC SERPL-MCNC: 328 UG/DL (ref 228–428)

## 2018-06-04 NOTE — PROGRESS NOTES
PATIENTS ONCOLOGY RECORD LOCATED IN [facility]      Subjective     Name:  EDE ROBLERO     Date:  2018  Address:  [facility] [place] [address]  Home: [phone]  :  1955 AGE:  62 y.o.        RECORDS OBTAINED:  Patients Oncology Record is located in [facility]

## 2018-06-05 ENCOUNTER — HOSPITAL ENCOUNTER (OUTPATIENT)
Dept: ONCOLOGY | Facility: CLINIC | Age: 63
Setting detail: INFUSION SERIES
Discharge: HOME | End: 2018-06-05
Attending: INTERNAL MEDICINE | Admitting: INTERNAL MEDICINE

## 2018-06-05 ENCOUNTER — HOSPITAL ENCOUNTER (OUTPATIENT)
Dept: ONCOLOGY | Facility: HOSPITAL | Age: 63
Discharge: HOME | End: 2018-06-05
Attending: INTERNAL MEDICINE | Admitting: INTERNAL MEDICINE

## 2018-06-05 ENCOUNTER — CLINICAL SUPPORT (OUTPATIENT)
Dept: ONCOLOGY | Facility: HOSPITAL | Age: 63
End: 2018-06-05

## 2018-06-05 LAB
ALBUMIN SERPL-MCNC: 3.4 G/DL (ref 3.5–4.8)
ALBUMIN/GLOB SERPL: 1.4 {RATIO} (ref 1–1.7)
ALP SERPL-CCNC: 96 IU/L (ref 32–91)
ALT SERPL-CCNC: 9 IU/L (ref 14–54)
ANION GAP SERPL CALC-SCNC: 11.3 MMOL/L (ref 10–20)
AST SERPL-CCNC: 21 IU/L (ref 15–41)
BILIRUB SERPL-MCNC: 0.4 MG/DL (ref 0.3–1.2)
BUN SERPL-MCNC: 12 MG/DL (ref 8–20)
BUN/CREAT SERPL: 13.3 (ref 5.4–26.2)
CALCIUM SERPL-MCNC: 9.1 MG/DL (ref 8.9–10.3)
CHLORIDE SERPL-SCNC: 105 MMOL/L (ref 101–111)
CONV CO2: 26 MMOL/L (ref 22–32)
CONV TOTAL PROTEIN: 5.9 G/DL (ref 6.1–7.9)
CREAT BLDA-MCNC: 0.9 MG/DL (ref 0.6–1.3)
CREAT UR-MCNC: 0.9 MG/DL (ref 0.4–1)
GLOBULIN UR ELPH-MCNC: 2.5 G/DL (ref 2.5–3.8)
GLUCOSE SERPL-MCNC: 63 MG/DL (ref 65–99)
MAGNESIUM SERPL-MCNC: 1.4 MG/DL (ref 1.8–2.5)
POTASSIUM SERPL-SCNC: 4.3 MMOL/L (ref 3.6–5.1)
SODIUM SERPL-SCNC: 138 MMOL/L (ref 136–144)

## 2018-06-05 NOTE — PROGRESS NOTES
PATIENTS ONCOLOGY RECORD LOCATED IN [facility]      Subjective     Name:  EDE ROBLERO     Date:  2018  Address:  [facility] [place] [address]  Home: [phone]  :  1955 AGE:  62 y.o.        RECORDS OBTAINED:  Patients Oncology Record is located in [facility]

## 2018-06-12 ENCOUNTER — HOSPITAL ENCOUNTER (OUTPATIENT)
Dept: ONCOLOGY | Facility: CLINIC | Age: 63
Setting detail: INFUSION SERIES
Discharge: HOME | End: 2018-06-12
Attending: INTERNAL MEDICINE | Admitting: INTERNAL MEDICINE

## 2018-06-12 ENCOUNTER — HOSPITAL ENCOUNTER (OUTPATIENT)
Dept: ONCOLOGY | Facility: HOSPITAL | Age: 63
Discharge: HOME | End: 2018-06-12
Attending: INTERNAL MEDICINE | Admitting: INTERNAL MEDICINE

## 2018-06-12 ENCOUNTER — CLINICAL SUPPORT (OUTPATIENT)
Dept: ONCOLOGY | Facility: HOSPITAL | Age: 63
End: 2018-06-12

## 2018-06-12 LAB
ANION GAP SERPL CALC-SCNC: 11.9 MMOL/L (ref 10–20)
BUN SERPL-MCNC: 13 MG/DL (ref 8–20)
BUN/CREAT SERPL: 14.4 (ref 5.4–26.2)
CALCIUM SERPL-MCNC: 9 MG/DL (ref 8.9–10.3)
CHLORIDE SERPL-SCNC: 104 MMOL/L (ref 101–111)
CONV CO2: 26 MMOL/L (ref 22–32)
CREAT UR-MCNC: 0.9 MG/DL (ref 0.4–1)
GLUCOSE SERPL-MCNC: 71 MG/DL (ref 65–99)
MAGNESIUM SERPL-MCNC: 1.3 MG/DL (ref 1.8–2.5)
POTASSIUM SERPL-SCNC: 3.9 MMOL/L (ref 3.6–5.1)
SODIUM SERPL-SCNC: 138 MMOL/L (ref 136–144)

## 2018-06-12 NOTE — PROGRESS NOTES
PATIENTS ONCOLOGY RECORD LOCATED IN [facility]      Subjective     Name:  EDE ROBLERO     Date:  2018  Address:  [facility] [place] [address]  Home: [phone]  :  1955 AGE:  62 y.o.        RECORDS OBTAINED:  Patients Oncology Record is located in [facility]

## 2018-06-18 ENCOUNTER — HOSPITAL ENCOUNTER (OUTPATIENT)
Dept: PAIN MEDICINE | Facility: HOSPITAL | Age: 63
Discharge: HOME | End: 2018-06-18
Attending: PHYSICAL MEDICINE & REHABILITATION | Admitting: PHYSICAL MEDICINE & REHABILITATION

## 2018-06-19 ENCOUNTER — HOSPITAL ENCOUNTER (OUTPATIENT)
Dept: ONCOLOGY | Facility: CLINIC | Age: 63
Setting detail: INFUSION SERIES
Discharge: HOME | End: 2018-06-19
Attending: INTERNAL MEDICINE | Admitting: INTERNAL MEDICINE

## 2018-06-19 ENCOUNTER — HOSPITAL ENCOUNTER (OUTPATIENT)
Dept: ONCOLOGY | Facility: HOSPITAL | Age: 63
Discharge: HOME | End: 2018-06-19
Attending: INTERNAL MEDICINE | Admitting: INTERNAL MEDICINE

## 2018-06-19 ENCOUNTER — CLINICAL SUPPORT (OUTPATIENT)
Dept: ONCOLOGY | Facility: HOSPITAL | Age: 63
End: 2018-06-19

## 2018-06-19 LAB — MAGNESIUM SERPL-MCNC: 1.5 MG/DL (ref 1.8–2.5)

## 2018-06-19 NOTE — PROGRESS NOTES
PATIENTS ONCOLOGY RECORD LOCATED IN [facility]      Subjective     Name:  EDE ROBLERO     Date:  2018  Address:  [facility] [place] [address]  Home: [phone]  :  1955 AGE:  62 y.o.        RECORDS OBTAINED:  Patients Oncology Record is located in [facility]

## 2018-06-26 ENCOUNTER — HOSPITAL ENCOUNTER (OUTPATIENT)
Dept: ONCOLOGY | Facility: CLINIC | Age: 63
Setting detail: INFUSION SERIES
Discharge: HOME | End: 2018-06-26
Attending: INTERNAL MEDICINE | Admitting: INTERNAL MEDICINE

## 2018-06-26 ENCOUNTER — CLINICAL SUPPORT (OUTPATIENT)
Dept: ONCOLOGY | Facility: HOSPITAL | Age: 63
End: 2018-06-26

## 2018-06-26 ENCOUNTER — HOSPITAL ENCOUNTER (OUTPATIENT)
Dept: ONCOLOGY | Facility: HOSPITAL | Age: 63
Discharge: HOME | End: 2018-06-26
Attending: INTERNAL MEDICINE | Admitting: INTERNAL MEDICINE

## 2018-06-26 LAB
ALBUMIN SERPL-MCNC: 3.1 G/DL (ref 3.5–4.8)
ALBUMIN/GLOB SERPL: 1.1 {RATIO} (ref 1–1.7)
ALP SERPL-CCNC: 85 IU/L (ref 32–91)
ALT SERPL-CCNC: 10 IU/L (ref 14–54)
ANION GAP SERPL CALC-SCNC: 9.3 MMOL/L (ref 10–20)
AST SERPL-CCNC: 18 IU/L (ref 15–41)
BILIRUB SERPL-MCNC: 0.6 MG/DL (ref 0.3–1.2)
BUN SERPL-MCNC: 16 MG/DL (ref 8–20)
BUN/CREAT SERPL: 17.8 (ref 5.4–26.2)
CALCIUM SERPL-MCNC: 9.1 MG/DL (ref 8.9–10.3)
CHLORIDE SERPL-SCNC: 107 MMOL/L (ref 101–111)
CONV CO2: 26 MMOL/L (ref 22–32)
CONV TOTAL PROTEIN: 6 G/DL (ref 6.1–7.9)
CREAT BLDA-MCNC: 0.9 MG/DL (ref 0.6–1.3)
CREAT UR-MCNC: 0.9 MG/DL (ref 0.4–1)
GLOBULIN UR ELPH-MCNC: 2.9 G/DL (ref 2.5–3.8)
GLUCOSE SERPL-MCNC: 81 MG/DL (ref 65–99)
MAGNESIUM SERPL-MCNC: 1.5 MG/DL (ref 1.8–2.5)
POTASSIUM SERPL-SCNC: 4.3 MMOL/L (ref 3.6–5.1)
SODIUM SERPL-SCNC: 138 MMOL/L (ref 136–144)

## 2018-06-26 NOTE — PROGRESS NOTES
PATIENTS ONCOLOGY RECORD LOCATED IN [facility]      Subjective     Name:  EDE ROBLERO     Date:  2018  Address:  [facility] [place] [address]  Home: [phone]  :  1955 AGE:  62 y.o.        RECORDS OBTAINED:  Patients Oncology Record is located in [facility]

## 2018-06-29 ENCOUNTER — CLINICAL SUPPORT (OUTPATIENT)
Dept: ONCOLOGY | Facility: HOSPITAL | Age: 63
End: 2018-06-29

## 2018-06-29 ENCOUNTER — HOSPITAL ENCOUNTER (OUTPATIENT)
Dept: ONCOLOGY | Facility: CLINIC | Age: 63
Setting detail: INFUSION SERIES
Discharge: HOME | End: 2018-06-29
Attending: NURSE PRACTITIONER | Admitting: NURSE PRACTITIONER

## 2018-06-29 NOTE — PROGRESS NOTES
PATIENTS ONCOLOGY RECORD LOCATED IN [facility]      Subjective     Name:  EDE ROBLERO     Date:  2018  Address:  [facility] [place] [address]  Home: [phone]  :  1955 AGE:  62 y.o.        RECORDS OBTAINED:  Patients Oncology Record is located in [facility]

## 2018-07-03 ENCOUNTER — HOSPITAL ENCOUNTER (OUTPATIENT)
Dept: ONCOLOGY | Facility: CLINIC | Age: 63
Setting detail: INFUSION SERIES
Discharge: HOME | End: 2018-07-03
Attending: INTERNAL MEDICINE | Admitting: INTERNAL MEDICINE

## 2018-07-03 ENCOUNTER — CLINICAL SUPPORT (OUTPATIENT)
Dept: ONCOLOGY | Facility: HOSPITAL | Age: 63
End: 2018-07-03

## 2018-07-03 ENCOUNTER — HOSPITAL ENCOUNTER (OUTPATIENT)
Dept: ONCOLOGY | Facility: HOSPITAL | Age: 63
Discharge: HOME | End: 2018-07-03
Attending: INTERNAL MEDICINE | Admitting: INTERNAL MEDICINE

## 2018-07-03 LAB — MAGNESIUM SERPL-MCNC: 1.3 MG/DL (ref 1.8–2.5)

## 2018-07-03 NOTE — PROGRESS NOTES
PATIENTS ONCOLOGY RECORD LOCATED IN [facility]      Subjective     Name:  EDE ROBLERO     Date:  2018  Address:  [facility] [place] [address]  Home: [phone]  :  1955 AGE:  62 y.o.        RECORDS OBTAINED:  Patients Oncology Record is located in [facility]

## 2018-07-10 ENCOUNTER — HOSPITAL ENCOUNTER (OUTPATIENT)
Dept: ONCOLOGY | Facility: CLINIC | Age: 63
Setting detail: INFUSION SERIES
Discharge: HOME | End: 2018-07-10
Attending: INTERNAL MEDICINE | Admitting: INTERNAL MEDICINE

## 2018-07-10 ENCOUNTER — CLINICAL SUPPORT (OUTPATIENT)
Dept: ONCOLOGY | Facility: HOSPITAL | Age: 63
End: 2018-07-10

## 2018-07-10 ENCOUNTER — HOSPITAL ENCOUNTER (OUTPATIENT)
Dept: ONCOLOGY | Facility: HOSPITAL | Age: 63
Discharge: HOME | End: 2018-07-10
Attending: INTERNAL MEDICINE | Admitting: INTERNAL MEDICINE

## 2018-07-10 LAB — MAGNESIUM SERPL-MCNC: 1.3 MG/DL (ref 1.8–2.5)

## 2018-07-10 NOTE — PROGRESS NOTES
PATIENTS ONCOLOGY RECORD LOCATED IN [facility]      Subjective     Name:  EDE ROBLERO     Date:  07/10/2018  Address:  [facility] [place] [address]  Home: [phone]  :  1955 AGE:  62 y.o.        RECORDS OBTAINED:  Patients Oncology Record is located in [facility]

## 2018-07-17 ENCOUNTER — CLINICAL SUPPORT (OUTPATIENT)
Dept: ONCOLOGY | Facility: HOSPITAL | Age: 63
End: 2018-07-17

## 2018-07-17 ENCOUNTER — HOSPITAL ENCOUNTER (OUTPATIENT)
Dept: ONCOLOGY | Facility: HOSPITAL | Age: 63
Discharge: HOME | End: 2018-07-17
Attending: INTERNAL MEDICINE | Admitting: INTERNAL MEDICINE

## 2018-07-17 ENCOUNTER — HOSPITAL ENCOUNTER (OUTPATIENT)
Dept: ONCOLOGY | Facility: CLINIC | Age: 63
Setting detail: INFUSION SERIES
Discharge: HOME | End: 2018-07-17
Attending: INTERNAL MEDICINE | Admitting: INTERNAL MEDICINE

## 2018-07-17 LAB — MAGNESIUM SERPL-MCNC: 1.2 MG/DL (ref 1.8–2.5)

## 2018-07-17 NOTE — PROGRESS NOTES
PATIENTS ONCOLOGY RECORD LOCATED IN [facility]      Subjective     Name:  EDE ROBLERO     Date:  2018  Address:  [facility] [place] [address]  Home: [phone]  :  1955 AGE:  63 y.o.        RECORDS OBTAINED:  Patients Oncology Record is located in [facility]

## 2018-07-24 ENCOUNTER — HOSPITAL ENCOUNTER (OUTPATIENT)
Dept: ONCOLOGY | Facility: HOSPITAL | Age: 63
Discharge: HOME | End: 2018-07-24
Attending: INTERNAL MEDICINE | Admitting: INTERNAL MEDICINE

## 2018-07-24 ENCOUNTER — HOSPITAL ENCOUNTER (OUTPATIENT)
Dept: ONCOLOGY | Facility: CLINIC | Age: 63
Setting detail: INFUSION SERIES
Discharge: HOME | End: 2018-07-24
Attending: INTERNAL MEDICINE | Admitting: INTERNAL MEDICINE

## 2018-07-24 LAB — MAGNESIUM SERPL-MCNC: 1.2 MG/DL (ref 1.8–2.5)

## 2018-08-01 ENCOUNTER — HOSPITAL ENCOUNTER (OUTPATIENT)
Dept: ONCOLOGY | Facility: CLINIC | Age: 63
Setting detail: INFUSION SERIES
Discharge: HOME | End: 2018-08-01
Attending: NURSE PRACTITIONER | Admitting: NURSE PRACTITIONER

## 2018-08-01 ENCOUNTER — CLINICAL SUPPORT (OUTPATIENT)
Dept: ONCOLOGY | Facility: HOSPITAL | Age: 63
End: 2018-08-01

## 2018-08-01 ENCOUNTER — HOSPITAL ENCOUNTER (OUTPATIENT)
Dept: ONCOLOGY | Facility: HOSPITAL | Age: 63
Discharge: HOME | End: 2018-08-01
Attending: INTERNAL MEDICINE | Admitting: INTERNAL MEDICINE

## 2018-08-01 LAB
ALBUMIN SERPL-MCNC: 3.5 G/DL (ref 3.5–4.8)
ALBUMIN/GLOB SERPL: 1.2 {RATIO} (ref 1–1.7)
ALP SERPL-CCNC: 93 IU/L (ref 32–91)
ALT SERPL-CCNC: 11 IU/L (ref 14–54)
ANION GAP SERPL CALC-SCNC: 9.3 MMOL/L (ref 10–20)
AST SERPL-CCNC: 24 IU/L (ref 15–41)
BILIRUB SERPL-MCNC: 0.4 MG/DL (ref 0.3–1.2)
BUN SERPL-MCNC: 13 MG/DL (ref 8–20)
BUN/CREAT SERPL: 14.4 (ref 5.4–26.2)
CALCIUM SERPL-MCNC: 9.4 MG/DL (ref 8.9–10.3)
CHLORIDE SERPL-SCNC: 106 MMOL/L (ref 101–111)
CONV CO2: 27 MMOL/L (ref 22–32)
CONV TOTAL PROTEIN: 6.4 G/DL (ref 6.1–7.9)
CREAT UR-MCNC: 0.9 MG/DL (ref 0.4–1)
GLOBULIN UR ELPH-MCNC: 2.9 G/DL (ref 2.5–3.8)
GLUCOSE SERPL-MCNC: 73 MG/DL (ref 65–99)
MAGNESIUM SERPL-MCNC: 1.3 MG/DL (ref 1.8–2.5)
POTASSIUM SERPL-SCNC: 4.3 MMOL/L (ref 3.6–5.1)
SODIUM SERPL-SCNC: 138 MMOL/L (ref 136–144)

## 2018-08-01 NOTE — PROGRESS NOTES
PATIENTS ONCOLOGY RECORD LOCATED IN [facility]      Subjective     Name:  EDE ROBLERO     Date:  2018  Address:  [facility] [place] [address]  Home: [phone]  :  1955 AGE:  63 y.o.        RECORDS OBTAINED:  Patients Oncology Record is located in [facility]

## 2018-08-17 ENCOUNTER — HOSPITAL ENCOUNTER (OUTPATIENT)
Dept: PAIN MEDICINE | Facility: HOSPITAL | Age: 63
Discharge: HOME | End: 2018-08-17
Attending: PHYSICAL MEDICINE & REHABILITATION | Admitting: PHYSICAL MEDICINE & REHABILITATION

## 2018-08-30 ENCOUNTER — CLINICAL SUPPORT (OUTPATIENT)
Dept: ONCOLOGY | Facility: HOSPITAL | Age: 63
End: 2018-08-30

## 2018-08-30 ENCOUNTER — HOSPITAL ENCOUNTER (OUTPATIENT)
Dept: ONCOLOGY | Facility: CLINIC | Age: 63
Setting detail: INFUSION SERIES
Discharge: HOME | End: 2018-08-30
Attending: INTERNAL MEDICINE | Admitting: INTERNAL MEDICINE

## 2018-08-30 ENCOUNTER — HOSPITAL ENCOUNTER (OUTPATIENT)
Dept: ONCOLOGY | Facility: HOSPITAL | Age: 63
Discharge: HOME | End: 2018-08-30
Attending: INTERNAL MEDICINE | Admitting: INTERNAL MEDICINE

## 2018-08-30 LAB — MAGNESIUM SERPL-MCNC: 1.5 MG/DL (ref 1.8–2.5)

## 2018-08-30 NOTE — PROGRESS NOTES
PATIENTS ONCOLOGY RECORD LOCATED IN [email or web address]      Subjective     Name:  EDE ROBLERO     Date:  2018  Address:  [facility] [place] [address]  Home: [phone]  :  1955 AGE:  63 y.o.        RECORDS OBTAINED:  Patients Oncology Record is located in [facility]

## 2018-09-06 ENCOUNTER — HOSPITAL ENCOUNTER (OUTPATIENT)
Dept: ONCOLOGY | Facility: HOSPITAL | Age: 63
Discharge: HOME | End: 2018-09-06
Attending: INTERNAL MEDICINE | Admitting: INTERNAL MEDICINE

## 2018-09-06 ENCOUNTER — HOSPITAL ENCOUNTER (OUTPATIENT)
Dept: ONCOLOGY | Facility: CLINIC | Age: 63
Setting detail: INFUSION SERIES
Discharge: HOME | End: 2018-09-06
Attending: INTERNAL MEDICINE | Admitting: INTERNAL MEDICINE

## 2018-09-06 ENCOUNTER — CLINICAL SUPPORT (OUTPATIENT)
Dept: ONCOLOGY | Facility: HOSPITAL | Age: 63
End: 2018-09-06

## 2018-09-06 LAB
ALBUMIN SERPL-MCNC: 3.6 G/DL (ref 3.5–4.8)
ALBUMIN/GLOB SERPL: 1.4 {RATIO} (ref 1–1.7)
ALP SERPL-CCNC: 103 IU/L (ref 32–91)
ALT SERPL-CCNC: 12 IU/L (ref 14–54)
ANION GAP SERPL CALC-SCNC: 13.4 MMOL/L (ref 10–20)
AST SERPL-CCNC: 27 IU/L (ref 15–41)
BILIRUB SERPL-MCNC: 0.5 MG/DL (ref 0.3–1.2)
BUN SERPL-MCNC: 13 MG/DL (ref 8–20)
BUN/CREAT SERPL: 10.8 (ref 5.4–26.2)
CALCIUM SERPL-MCNC: 8.9 MG/DL (ref 8.9–10.3)
CHLORIDE SERPL-SCNC: 104 MMOL/L (ref 101–111)
CONV CO2: 27 MMOL/L (ref 22–32)
CONV TOTAL PROTEIN: 6.1 G/DL (ref 6.1–7.9)
CREAT UR-MCNC: 1.2 MG/DL (ref 0.4–1)
GLOBULIN UR ELPH-MCNC: 2.5 G/DL (ref 2.5–3.8)
GLUCOSE SERPL-MCNC: 123 MG/DL (ref 65–99)
MAGNESIUM SERPL-MCNC: 1.3 MG/DL (ref 1.8–2.5)
POTASSIUM SERPL-SCNC: 3.4 MMOL/L (ref 3.6–5.1)
SODIUM SERPL-SCNC: 141 MMOL/L (ref 136–144)

## 2018-09-06 NOTE — PROGRESS NOTES
PATIENTS ONCOLOGY RECORD LOCATED IN [facility]      Subjective     Name:  EDE ROBLERO     Date:  2018  Address:  [facility] [place] [address]  Home: [phone]  :  1955 AGE:  63 y.o.        RECORDS OBTAINED:  Patients Oncology Record is located in [facility]

## 2018-09-10 ENCOUNTER — HOSPITAL ENCOUNTER (OUTPATIENT)
Dept: ONCOLOGY | Facility: CLINIC | Age: 63
Setting detail: INFUSION SERIES
Discharge: HOME | End: 2018-09-10
Attending: INTERNAL MEDICINE | Admitting: INTERNAL MEDICINE

## 2018-09-10 ENCOUNTER — HOSPITAL ENCOUNTER (OUTPATIENT)
Dept: ONCOLOGY | Facility: HOSPITAL | Age: 63
Discharge: HOME | End: 2018-09-10
Attending: INTERNAL MEDICINE | Admitting: INTERNAL MEDICINE

## 2018-09-10 ENCOUNTER — CLINICAL SUPPORT (OUTPATIENT)
Dept: ONCOLOGY | Facility: HOSPITAL | Age: 63
End: 2018-09-10

## 2018-09-10 LAB — MAGNESIUM SERPL-MCNC: 1.3 MG/DL (ref 1.8–2.5)

## 2018-09-10 NOTE — PROGRESS NOTES
PATIENTS ONCOLOGY RECORD LOCATED IN [facility]      Subjective     Name:  EDE ROBLERO     Date:  09/10/2018  Address:  [facility] [place] [address]  Home: [phone]  :  1955 AGE:  63 y.o.        RECORDS OBTAINED:  Patients Oncology Record is located in [facility]

## 2018-09-18 ENCOUNTER — HOSPITAL ENCOUNTER (OUTPATIENT)
Dept: ONCOLOGY | Facility: CLINIC | Age: 63
Setting detail: INFUSION SERIES
Discharge: HOME | End: 2018-09-18
Attending: INTERNAL MEDICINE | Admitting: INTERNAL MEDICINE

## 2018-09-18 ENCOUNTER — CLINICAL SUPPORT (OUTPATIENT)
Dept: ONCOLOGY | Facility: HOSPITAL | Age: 63
End: 2018-09-18

## 2018-09-18 NOTE — PROGRESS NOTES
PATIENTS ONCOLOGY RECORD LOCATED IN [facility]      Subjective     Name:  EDE ROBLERO     Date:  2018  Address:  [facility] [place] [address]  Home: [phone]  :  1955 AGE:  63 y.o.        RECORDS OBTAINED:  Patients Oncology Record is located in [facility]

## 2018-09-24 ENCOUNTER — CLINICAL SUPPORT (OUTPATIENT)
Dept: ONCOLOGY | Facility: HOSPITAL | Age: 63
End: 2018-09-24

## 2018-09-24 ENCOUNTER — HOSPITAL ENCOUNTER (OUTPATIENT)
Dept: ONCOLOGY | Facility: HOSPITAL | Age: 63
Discharge: HOME | End: 2018-09-24
Attending: INTERNAL MEDICINE | Admitting: INTERNAL MEDICINE

## 2018-09-24 ENCOUNTER — HOSPITAL ENCOUNTER (OUTPATIENT)
Dept: ONCOLOGY | Facility: CLINIC | Age: 63
Setting detail: INFUSION SERIES
Discharge: HOME | End: 2018-09-24
Attending: INTERNAL MEDICINE | Admitting: INTERNAL MEDICINE

## 2018-09-24 LAB — MAGNESIUM SERPL-MCNC: 1.6 MG/DL (ref 1.8–2.5)

## 2018-09-24 NOTE — PROGRESS NOTES
PATIENTS ONCOLOGY RECORD LOCATED IN [facility]      Subjective     Name:  EDE ROBLERO     Date:  2018  Address:  [facility] [place] [address]  Home: [phone]  :  1955 AGE:  63 y.o.        RECORDS OBTAINED:  Patients Oncology Record is located in [facility]

## 2018-10-01 ENCOUNTER — HOSPITAL ENCOUNTER (OUTPATIENT)
Dept: ONCOLOGY | Facility: HOSPITAL | Age: 63
Discharge: HOME | End: 2018-10-01
Attending: NURSE PRACTITIONER | Admitting: NURSE PRACTITIONER

## 2018-10-01 ENCOUNTER — CLINICAL SUPPORT (OUTPATIENT)
Dept: ONCOLOGY | Facility: HOSPITAL | Age: 63
End: 2018-10-01

## 2018-10-01 ENCOUNTER — HOSPITAL ENCOUNTER (OUTPATIENT)
Dept: ONCOLOGY | Facility: CLINIC | Age: 63
Setting detail: INFUSION SERIES
Discharge: HOME | End: 2018-10-01
Attending: NURSE PRACTITIONER | Admitting: NURSE PRACTITIONER

## 2018-10-01 LAB
ALBUMIN SERPL-MCNC: 4 G/DL (ref 3.5–4.8)
ALBUMIN/GLOB SERPL: 1.4 {RATIO} (ref 1–1.7)
ALP SERPL-CCNC: 133 IU/L (ref 32–91)
ALT SERPL-CCNC: 16 IU/L (ref 14–54)
ANION GAP SERPL CALC-SCNC: 13.4 MMOL/L (ref 10–20)
AST SERPL-CCNC: 32 IU/L (ref 15–41)
BILIRUB SERPL-MCNC: 0.6 MG/DL (ref 0.3–1.2)
BUN SERPL-MCNC: 21 MG/DL (ref 8–20)
BUN/CREAT SERPL: 16.2 (ref 5.4–26.2)
CALCIUM SERPL-MCNC: 9.3 MG/DL (ref 8.9–10.3)
CHLORIDE SERPL-SCNC: 102 MMOL/L (ref 101–111)
CONV CO2: 25 MMOL/L (ref 22–32)
CONV TOTAL PROTEIN: 6.8 G/DL (ref 6.1–7.9)
CREAT UR-MCNC: 1.3 MG/DL (ref 0.4–1)
GLOBULIN UR ELPH-MCNC: 2.8 G/DL (ref 2.5–3.8)
GLUCOSE SERPL-MCNC: 85 MG/DL (ref 65–99)
MAGNESIUM SERPL-MCNC: 1.4 MG/DL (ref 1.8–2.5)
POTASSIUM SERPL-SCNC: 4.4 MMOL/L (ref 3.6–5.1)
SODIUM SERPL-SCNC: 136 MMOL/L (ref 136–144)

## 2018-10-01 NOTE — PROGRESS NOTES
PATIENTS ONCOLOGY RECORD LOCATED IN [facility]      Subjective     Name:  EDE ROBLERO     Date:  10/01/2018  Address:  [facility] [place] [address]  Home: [phone]  :  1955 AGE:  63 y.o.        RECORDS OBTAINED:  Patients Oncology Record is located in [facility]

## 2018-10-08 ENCOUNTER — HOSPITAL ENCOUNTER (OUTPATIENT)
Dept: ONCOLOGY | Facility: HOSPITAL | Age: 63
Discharge: HOME | End: 2018-10-08
Attending: INTERNAL MEDICINE | Admitting: INTERNAL MEDICINE

## 2018-10-08 ENCOUNTER — HOSPITAL ENCOUNTER (OUTPATIENT)
Dept: ONCOLOGY | Facility: CLINIC | Age: 63
Setting detail: INFUSION SERIES
Discharge: HOME | End: 2018-10-08
Attending: INTERNAL MEDICINE | Admitting: INTERNAL MEDICINE

## 2018-10-08 ENCOUNTER — CLINICAL SUPPORT (OUTPATIENT)
Dept: ONCOLOGY | Facility: HOSPITAL | Age: 63
End: 2018-10-08

## 2018-10-08 LAB — MAGNESIUM SERPL-MCNC: 1.5 MG/DL (ref 1.8–2.5)

## 2018-10-08 NOTE — PROGRESS NOTES
PATIENTS ONCOLOGY RECORD LOCATED IN [facility]      Subjective     Name:  EDE ROBLERO     Date:  10/08/2018  Address:  [facility] [place] [address]  Home:   :  1955 AGE:  63 y.o.        RECORDS OBTAINED:  Patients Oncology Record is located in [facility]

## 2018-10-15 ENCOUNTER — HOSPITAL ENCOUNTER (OUTPATIENT)
Dept: PAIN MEDICINE | Facility: HOSPITAL | Age: 63
Discharge: HOME | End: 2018-10-15
Attending: PHYSICAL MEDICINE & REHABILITATION | Admitting: PHYSICAL MEDICINE & REHABILITATION

## 2018-10-15 ENCOUNTER — HOSPITAL ENCOUNTER (OUTPATIENT)
Dept: ONCOLOGY | Facility: HOSPITAL | Age: 63
Discharge: HOME | End: 2018-10-15
Attending: INTERNAL MEDICINE | Admitting: INTERNAL MEDICINE

## 2018-10-15 ENCOUNTER — HOSPITAL ENCOUNTER (OUTPATIENT)
Dept: ONCOLOGY | Facility: CLINIC | Age: 63
Setting detail: INFUSION SERIES
Discharge: HOME | End: 2018-10-15
Attending: INTERNAL MEDICINE | Admitting: INTERNAL MEDICINE

## 2018-10-15 ENCOUNTER — CLINICAL SUPPORT (OUTPATIENT)
Dept: ONCOLOGY | Facility: HOSPITAL | Age: 63
End: 2018-10-15

## 2018-10-15 LAB — MAGNESIUM SERPL-MCNC: 1.7 MG/DL (ref 1.8–2.5)

## 2018-10-22 ENCOUNTER — HOSPITAL ENCOUNTER (OUTPATIENT)
Dept: ONCOLOGY | Facility: HOSPITAL | Age: 63
Discharge: HOME | End: 2018-10-22
Attending: INTERNAL MEDICINE | Admitting: INTERNAL MEDICINE

## 2018-10-22 ENCOUNTER — HOSPITAL ENCOUNTER (OUTPATIENT)
Dept: ONCOLOGY | Facility: CLINIC | Age: 63
Setting detail: INFUSION SERIES
Discharge: HOME | End: 2018-10-22
Attending: INTERNAL MEDICINE | Admitting: INTERNAL MEDICINE

## 2018-10-22 ENCOUNTER — CLINICAL SUPPORT (OUTPATIENT)
Dept: ONCOLOGY | Facility: HOSPITAL | Age: 63
End: 2018-10-22

## 2018-10-22 LAB — MAGNESIUM SERPL-MCNC: 1.7 MG/DL (ref 1.8–2.5)

## 2018-10-22 NOTE — PROGRESS NOTES
PATIENTS ONCOLOGY RECORD LOCATED IN [facility]      Subjective     Name:  EDE ROBLERO     Date:  10/22/2018  Address:  [facility] [place] [address]  Home:   :  1955 AGE:  63 y.o.        RECORDS OBTAINED:  Patients Oncology Record is located in [facility]

## 2018-10-29 ENCOUNTER — HOSPITAL ENCOUNTER (OUTPATIENT)
Dept: ONCOLOGY | Facility: CLINIC | Age: 63
Setting detail: INFUSION SERIES
Discharge: HOME | End: 2018-10-29
Attending: INTERNAL MEDICINE | Admitting: INTERNAL MEDICINE

## 2018-10-29 ENCOUNTER — HOSPITAL ENCOUNTER (OUTPATIENT)
Dept: ONCOLOGY | Facility: HOSPITAL | Age: 63
Discharge: HOME | End: 2018-10-29
Attending: INTERNAL MEDICINE | Admitting: INTERNAL MEDICINE

## 2018-10-29 ENCOUNTER — CLINICAL SUPPORT (OUTPATIENT)
Dept: ONCOLOGY | Facility: HOSPITAL | Age: 63
End: 2018-10-29

## 2018-10-29 LAB — MAGNESIUM SERPL-MCNC: 1.6 MG/DL (ref 1.8–2.5)

## 2018-10-29 NOTE — PROGRESS NOTES
PATIENTS ONCOLOGY RECORD LOCATED IN [facility]      Subjective     Name:  EDE ROBLERO     Date:  10/29/2018  Address:  [facility] [place] [address]  Home:   :  1955 AGE:  63 y.o.        RECORDS OBTAINED:  Patients Oncology Record is located in [facility]

## 2018-11-05 ENCOUNTER — HOSPITAL ENCOUNTER (OUTPATIENT)
Dept: ONCOLOGY | Facility: CLINIC | Age: 63
Setting detail: INFUSION SERIES
Discharge: HOME | End: 2018-11-05
Attending: INTERNAL MEDICINE | Admitting: INTERNAL MEDICINE

## 2018-11-05 ENCOUNTER — CLINICAL SUPPORT (OUTPATIENT)
Dept: ONCOLOGY | Facility: HOSPITAL | Age: 63
End: 2018-11-05

## 2018-11-05 NOTE — PROGRESS NOTES
PATIENTS ONCOLOGY RECORD LOCATED IN [facility]      Subjective     Name:  EDE ROBLERO     Date:  2018  Address:  [facility] [place] [address]  Home:   :  1955 AGE:  63 y.o.        RECORDS OBTAINED:  Patients Oncology Record is located in [facility]

## 2018-11-07 ENCOUNTER — HOSPITAL ENCOUNTER (OUTPATIENT)
Dept: ONCOLOGY | Facility: CLINIC | Age: 63
Setting detail: INFUSION SERIES
Discharge: HOME | End: 2018-11-07
Attending: INTERNAL MEDICINE | Admitting: INTERNAL MEDICINE

## 2018-11-07 ENCOUNTER — HOSPITAL ENCOUNTER (OUTPATIENT)
Dept: ONCOLOGY | Facility: HOSPITAL | Age: 63
Discharge: HOME | End: 2018-11-07
Attending: INTERNAL MEDICINE | Admitting: INTERNAL MEDICINE

## 2018-11-07 ENCOUNTER — CLINICAL SUPPORT (OUTPATIENT)
Dept: ONCOLOGY | Facility: HOSPITAL | Age: 63
End: 2018-11-07

## 2018-11-07 NOTE — PROGRESS NOTES
PATIENTS ONCOLOGY RECORD LOCATED IN [facility]      Subjective     Name:  EDE ROBLERO     Date:  2018  Address:  [facility] [place] [address]  Home:   :  1955 AGE:  63 y.o.        RECORDS OBTAINED:  Patients Oncology Record is located in [facility]

## 2018-11-12 ENCOUNTER — CLINICAL SUPPORT (OUTPATIENT)
Dept: ONCOLOGY | Facility: HOSPITAL | Age: 63
End: 2018-11-12

## 2018-11-12 ENCOUNTER — HOSPITAL ENCOUNTER (OUTPATIENT)
Dept: ONCOLOGY | Facility: CLINIC | Age: 63
Setting detail: INFUSION SERIES
Discharge: HOME | End: 2018-11-12
Attending: INTERNAL MEDICINE | Admitting: INTERNAL MEDICINE

## 2018-11-12 ENCOUNTER — HOSPITAL ENCOUNTER (OUTPATIENT)
Dept: ONCOLOGY | Facility: HOSPITAL | Age: 63
Discharge: HOME | End: 2018-11-12
Attending: INTERNAL MEDICINE | Admitting: INTERNAL MEDICINE

## 2018-11-12 LAB — MAGNESIUM SERPL-MCNC: 2.6 MG/DL (ref 1.8–2.5)

## 2018-11-12 NOTE — PROGRESS NOTES
PATIENTS ONCOLOGY RECORD LOCATED IN [facility]      Subjective     Name:  EDE ROBLERO     Date:  2018  Address:  [facility] [place] [address]  Home: [unfilled]  :  1955 AGE:  63 y.o.        RECORDS OBTAINED:  Patients Oncology Record is located in [facility]

## 2018-11-14 ENCOUNTER — CLINICAL SUPPORT (OUTPATIENT)
Dept: ONCOLOGY | Facility: HOSPITAL | Age: 63
End: 2018-11-14

## 2018-11-14 ENCOUNTER — HOSPITAL ENCOUNTER (OUTPATIENT)
Dept: ONCOLOGY | Facility: CLINIC | Age: 63
Setting detail: INFUSION SERIES
Discharge: HOME | End: 2018-11-14
Attending: INTERNAL MEDICINE | Admitting: INTERNAL MEDICINE

## 2018-11-14 NOTE — PROGRESS NOTES
PATIENTS ONCOLOGY RECORD LOCATED IN [facility]      Subjective     Name:  EDE ROBLERO     Date:  2018  Address:  [facility] [place] [address]  Home: [unfilled]  :  1955 AGE:  63 y.o.        RECORDS OBTAINED:  Patients Oncology Record is located in [facility]

## 2018-11-26 ENCOUNTER — HOSPITAL ENCOUNTER (OUTPATIENT)
Dept: ONCOLOGY | Facility: CLINIC | Age: 63
Setting detail: INFUSION SERIES
Discharge: HOME | End: 2018-11-26
Attending: INTERNAL MEDICINE | Admitting: INTERNAL MEDICINE

## 2018-11-26 ENCOUNTER — HOSPITAL ENCOUNTER (OUTPATIENT)
Dept: ONCOLOGY | Facility: HOSPITAL | Age: 63
Discharge: HOME | End: 2018-11-26
Attending: INTERNAL MEDICINE | Admitting: INTERNAL MEDICINE

## 2018-11-26 ENCOUNTER — CLINICAL SUPPORT (OUTPATIENT)
Dept: ONCOLOGY | Facility: HOSPITAL | Age: 63
End: 2018-11-26

## 2018-11-26 LAB — MAGNESIUM SERPL-MCNC: 1.4 MG/DL (ref 1.8–2.5)

## 2018-11-26 NOTE — PROGRESS NOTES
PATIENTS ONCOLOGY RECORD LOCATED IN [facility]      Subjective     Name:  EDE ROBLERO     Date:  2018  Address:  [facility] [place] [address]  Home: [unfilled]  :  1955 AGE:  63 y.o.        RECORDS OBTAINED:  Patients Oncology Record is located in [facility]

## 2018-12-03 ENCOUNTER — HOSPITAL ENCOUNTER (OUTPATIENT)
Dept: ONCOLOGY | Facility: HOSPITAL | Age: 63
Discharge: HOME | End: 2018-12-03
Attending: INTERNAL MEDICINE | Admitting: INTERNAL MEDICINE

## 2018-12-03 ENCOUNTER — HOSPITAL ENCOUNTER (OUTPATIENT)
Dept: ONCOLOGY | Facility: CLINIC | Age: 63
Setting detail: INFUSION SERIES
Discharge: HOME | End: 2018-12-03
Attending: NURSE PRACTITIONER | Admitting: NURSE PRACTITIONER

## 2018-12-03 ENCOUNTER — CLINICAL SUPPORT (OUTPATIENT)
Dept: ONCOLOGY | Facility: HOSPITAL | Age: 63
End: 2018-12-03

## 2018-12-03 LAB — MAGNESIUM SERPL-MCNC: 1.3 MG/DL (ref 1.8–2.5)

## 2018-12-03 NOTE — PROGRESS NOTES
PATIENTS ONCOLOGY RECORD LOCATED IN [facility]      Subjective     Name:  EDE ROBLERO     Date:  2018  Address:  [facility] [place] [address]  Home: [unfilled]  :  1955 AGE:  63 y.o.        RECORDS OBTAINED:  Patients Oncology Record is located in [facility]

## 2018-12-17 ENCOUNTER — HOSPITAL ENCOUNTER (OUTPATIENT)
Dept: FAMILY MEDICINE CLINIC | Facility: CLINIC | Age: 63
Setting detail: SPECIMEN
Discharge: HOME | End: 2018-12-17
Attending: FAMILY MEDICINE | Admitting: FAMILY MEDICINE

## 2018-12-17 ENCOUNTER — HOSPITAL ENCOUNTER (OUTPATIENT)
Dept: PAIN MEDICINE | Facility: HOSPITAL | Age: 63
Discharge: HOME | End: 2018-12-17
Attending: PHYSICAL MEDICINE & REHABILITATION | Admitting: PHYSICAL MEDICINE & REHABILITATION

## 2018-12-17 LAB
ALBUMIN SERPL-MCNC: 3.9 G/DL (ref 3.5–4.8)
ALBUMIN/GLOB SERPL: 1.5 {RATIO} (ref 1–1.7)
ALP SERPL-CCNC: 135 IU/L (ref 32–91)
ALT SERPL-CCNC: 18 IU/L (ref 14–54)
ANION GAP SERPL CALC-SCNC: 13.2 MMOL/L (ref 10–20)
AST SERPL-CCNC: 32 IU/L (ref 15–41)
BASOPHILS # BLD AUTO: 0 10*3/UL (ref 0–0.2)
BASOPHILS NFR BLD AUTO: 1 % (ref 0–2)
BILIRUB SERPL-MCNC: 0.9 MG/DL (ref 0.3–1.2)
BUN SERPL-MCNC: 17 MG/DL (ref 8–20)
BUN/CREAT SERPL: 14.2 (ref 5.4–26.2)
CALCIUM SERPL-MCNC: 9.5 MG/DL (ref 8.9–10.3)
CHLORIDE SERPL-SCNC: 101 MMOL/L (ref 101–111)
CHOLEST SERPL-MCNC: 274 MG/DL
CHOLEST/HDLC SERPL: 3.6 {RATIO}
CONV CO2: 27 MMOL/L (ref 22–32)
CONV LDL CHOLESTEROL DIRECT: 194 MG/DL (ref 0–100)
CONV TOTAL PROTEIN: 6.5 G/DL (ref 6.1–7.9)
CREAT UR-MCNC: 1.2 MG/DL (ref 0.4–1)
DIFFERENTIAL METHOD BLD: (no result)
EOSINOPHIL # BLD AUTO: 0.1 10*3/UL (ref 0–0.3)
EOSINOPHIL # BLD AUTO: 2 % (ref 0–3)
ERYTHROCYTE [DISTWIDTH] IN BLOOD BY AUTOMATED COUNT: 17.7 % (ref 11.5–14.5)
GLOBULIN UR ELPH-MCNC: 2.6 G/DL (ref 2.5–3.8)
GLUCOSE SERPL-MCNC: 90 MG/DL (ref 65–99)
HCT VFR BLD AUTO: 36.7 % (ref 35–49)
HDLC SERPL-MCNC: 76 MG/DL
HGB BLD-MCNC: 12.2 G/DL (ref 12–15)
LDLC/HDLC SERPL: 2.6 {RATIO}
LIPID INTERPRETATION: ABNORMAL
LYMPHOCYTES # BLD AUTO: 1.4 10*3/UL (ref 0.8–4.8)
LYMPHOCYTES NFR BLD AUTO: 32 % (ref 18–42)
MCH RBC QN AUTO: 33.6 PG (ref 26–32)
MCHC RBC AUTO-ENTMCNC: 33.2 G/DL (ref 32–36)
MCV RBC AUTO: 101.2 FL (ref 80–94)
MONOCYTES # BLD AUTO: 0.7 10*3/UL (ref 0.1–1.3)
MONOCYTES NFR BLD AUTO: 17 % (ref 2–11)
NEUTROPHILS # BLD AUTO: 2 10*3/UL (ref 2.3–8.6)
NEUTROPHILS NFR BLD AUTO: 48 % (ref 50–75)
NRBC BLD AUTO-RTO: 0 /100{WBCS}
NRBC/RBC NFR BLD MANUAL: 0 10*3/UL
PLATELET # BLD AUTO: 165 10*3/UL (ref 150–450)
PMV BLD AUTO: 8.6 FL (ref 7.4–10.4)
POTASSIUM SERPL-SCNC: 4.2 MMOL/L (ref 3.6–5.1)
RBC # BLD AUTO: 3.63 10*6/UL (ref 4–5.4)
SODIUM SERPL-SCNC: 137 MMOL/L (ref 136–144)
TRIGL SERPL-MCNC: 73 MG/DL
VLDLC SERPL CALC-MCNC: 4.2 MG/DL
WBC # BLD AUTO: 4.2 10*3/UL (ref 4.5–11.5)

## 2019-01-07 ENCOUNTER — HOSPITAL ENCOUNTER (OUTPATIENT)
Dept: ONCOLOGY | Facility: HOSPITAL | Age: 64
Discharge: HOME | End: 2019-01-07
Attending: INTERNAL MEDICINE | Admitting: INTERNAL MEDICINE

## 2019-01-07 ENCOUNTER — HOSPITAL ENCOUNTER (OUTPATIENT)
Dept: ONCOLOGY | Facility: CLINIC | Age: 64
Setting detail: INFUSION SERIES
Discharge: HOME | End: 2019-01-07
Attending: INTERNAL MEDICINE | Admitting: INTERNAL MEDICINE

## 2019-01-07 ENCOUNTER — CLINICAL SUPPORT (OUTPATIENT)
Dept: ONCOLOGY | Facility: HOSPITAL | Age: 64
End: 2019-01-07

## 2019-01-07 LAB — MAGNESIUM SERPL-MCNC: 1.5 MG/DL (ref 1.8–2.5)

## 2019-01-07 NOTE — PROGRESS NOTES
PATIENTS ONCOLOGY RECORD LOCATED IN [facility]      Subjective     Name:  EDE ROBLERO     Date:  2019  Address:  [facility] [place] [address]  Home: [unfilled]  :  1955 AGE:  63 y.o.        RECORDS OBTAINED:  Patients Oncology Record is located in [facility]

## 2019-01-10 ENCOUNTER — CLINICAL SUPPORT (OUTPATIENT)
Dept: ONCOLOGY | Facility: HOSPITAL | Age: 64
End: 2019-01-10

## 2019-01-10 ENCOUNTER — HOSPITAL ENCOUNTER (OUTPATIENT)
Dept: ONCOLOGY | Facility: CLINIC | Age: 64
Setting detail: INFUSION SERIES
Discharge: HOME | End: 2019-01-10
Attending: INTERNAL MEDICINE | Admitting: INTERNAL MEDICINE

## 2019-01-10 ENCOUNTER — HOSPITAL ENCOUNTER (OUTPATIENT)
Dept: ONCOLOGY | Facility: HOSPITAL | Age: 64
Discharge: HOME | End: 2019-01-10
Attending: INTERNAL MEDICINE | Admitting: INTERNAL MEDICINE

## 2019-01-10 LAB — MAGNESIUM SERPL-MCNC: 1.6 MG/DL (ref 1.8–2.5)

## 2019-01-11 NOTE — PROGRESS NOTES
PATIENTS ONCOLOGY RECORD LOCATED IN [facility]      Subjective     Name:  EDE ROBLERO     Date:  01/10/2019  Address:  [facility] [place] [address]  Home: [unfilled]  :  1955 AGE:  63 y.o.        RECORDS OBTAINED:  Patients Oncology Record is located in [facility]

## 2019-01-15 ENCOUNTER — CLINICAL SUPPORT (OUTPATIENT)
Dept: ONCOLOGY | Facility: HOSPITAL | Age: 64
End: 2019-01-15

## 2019-01-15 ENCOUNTER — HOSPITAL ENCOUNTER (OUTPATIENT)
Dept: ONCOLOGY | Facility: CLINIC | Age: 64
Setting detail: INFUSION SERIES
Discharge: HOME | End: 2019-01-15
Attending: INTERNAL MEDICINE | Admitting: INTERNAL MEDICINE

## 2019-01-15 ENCOUNTER — HOSPITAL ENCOUNTER (OUTPATIENT)
Dept: ONCOLOGY | Facility: HOSPITAL | Age: 64
Discharge: HOME | End: 2019-01-15
Attending: INTERNAL MEDICINE | Admitting: INTERNAL MEDICINE

## 2019-01-15 LAB — MAGNESIUM SERPL-MCNC: 1.7 MG/DL (ref 1.8–2.5)

## 2019-01-15 NOTE — PROGRESS NOTES
PATIENTS ONCOLOGY RECORD LOCATED IN [facility]      Subjective     Name:  EDE ROBLERO     Date:  01/15/2019  Address:  [facility] [place] [address]  Home: [unfilled]  :  1955 AGE:  63 y.o.        RECORDS OBTAINED:  Patients Oncology Record is located in [facility]

## 2019-01-18 ENCOUNTER — CLINICAL SUPPORT (OUTPATIENT)
Dept: ONCOLOGY | Facility: HOSPITAL | Age: 64
End: 2019-01-18

## 2019-01-18 ENCOUNTER — HOSPITAL ENCOUNTER (OUTPATIENT)
Dept: ONCOLOGY | Facility: HOSPITAL | Age: 64
Discharge: HOME | End: 2019-01-18
Attending: INTERNAL MEDICINE | Admitting: INTERNAL MEDICINE

## 2019-01-18 ENCOUNTER — HOSPITAL ENCOUNTER (OUTPATIENT)
Dept: ONCOLOGY | Facility: CLINIC | Age: 64
Setting detail: INFUSION SERIES
Discharge: HOME | End: 2019-01-18
Attending: INTERNAL MEDICINE | Admitting: INTERNAL MEDICINE

## 2019-01-18 LAB — MAGNESIUM SERPL-MCNC: 1.3 MG/DL (ref 1.8–2.5)

## 2019-01-18 NOTE — PROGRESS NOTES
PATIENTS ONCOLOGY RECORD LOCATED IN [facility]      Subjective     Name:  EDE ROBLERO     Date:  2019  Address[facility]  [facility] [place] [address]  Home: [unfilled]  :  1955 AGE:  63 y.o.        RECORDS OBTAINED:  Patients Oncology Record is located in [facility]

## 2019-01-25 ENCOUNTER — CLINICAL SUPPORT (OUTPATIENT)
Dept: ONCOLOGY | Facility: HOSPITAL | Age: 64
End: 2019-01-25

## 2019-01-25 ENCOUNTER — HOSPITAL ENCOUNTER (OUTPATIENT)
Dept: ONCOLOGY | Facility: HOSPITAL | Age: 64
Discharge: HOME | End: 2019-01-25
Attending: INTERNAL MEDICINE | Admitting: INTERNAL MEDICINE

## 2019-01-25 ENCOUNTER — HOSPITAL ENCOUNTER (OUTPATIENT)
Dept: ONCOLOGY | Facility: CLINIC | Age: 64
Setting detail: INFUSION SERIES
Discharge: HOME | End: 2019-01-25
Attending: INTERNAL MEDICINE | Admitting: INTERNAL MEDICINE

## 2019-01-25 LAB — MAGNESIUM SERPL-MCNC: 1.7 MG/DL (ref 1.8–2.5)

## 2019-01-25 NOTE — PROGRESS NOTES
PATIENTS ONCOLOGY RECORD LOCATED IN [facility]      Subjective     Name:  EDE ROBLERO     Date:  2019  Address:  [facility] [place] [address]  Home: [unfilled]  :  1955 AGE:  63 y.o.        RECORDS OBTAINED:  Patients Oncology Record is located in [facility]

## 2019-01-31 ENCOUNTER — HOSPITAL ENCOUNTER (OUTPATIENT)
Dept: CARDIOLOGY | Facility: HOSPITAL | Age: 64
Discharge: HOME | End: 2019-01-31
Attending: INTERNAL MEDICINE | Admitting: INTERNAL MEDICINE

## 2019-02-04 ENCOUNTER — CLINICAL SUPPORT (OUTPATIENT)
Dept: ONCOLOGY | Facility: HOSPITAL | Age: 64
End: 2019-02-04

## 2019-02-04 ENCOUNTER — HOSPITAL ENCOUNTER (OUTPATIENT)
Dept: ONCOLOGY | Facility: CLINIC | Age: 64
Setting detail: INFUSION SERIES
Discharge: HOME | End: 2019-02-04
Attending: NURSE PRACTITIONER | Admitting: NURSE PRACTITIONER

## 2019-02-04 NOTE — PROGRESS NOTES
PATIENTS ONCOLOGY RECORD LOCATED IN [facility]      Subjective     Name:  EDE ROBLERO     Date:  2019  Address:  [facility] [place] [address]  Home: [unfilled]  :  1955 AGE:  63 y.o.        RECORDS OBTAINED:  Patients Oncology Record is located in [facility]

## 2019-02-14 ENCOUNTER — HOSPITAL ENCOUNTER (OUTPATIENT)
Dept: PAIN MEDICINE | Facility: HOSPITAL | Age: 64
Discharge: HOME | End: 2019-02-14
Attending: PHYSICAL MEDICINE & REHABILITATION | Admitting: PHYSICAL MEDICINE & REHABILITATION

## 2019-02-14 LAB
AMPHETAMINES UR QL SCN: NEGATIVE
BARBITURATES UR QL SCN: NEGATIVE
BENZODIAZ UR QL SCN: ABNORMAL
BZE UR QL SCN: NEGATIVE
CREAT 24H UR-MCNC: ABNORMAL MG/DL
METHADONE UR QL SCN: NEGATIVE
OPIATE CONFIRMATION URINE: ABNORMAL
OPIATES TESTED UR SCN: NEGATIVE
PCP UR QL: NEGATIVE
THC SERPLBLD CFM-MCNC: NEGATIVE NG/ML

## 2019-02-15 ENCOUNTER — HOSPITAL ENCOUNTER (OUTPATIENT)
Dept: ONCOLOGY | Facility: HOSPITAL | Age: 64
Discharge: HOME | End: 2019-02-15
Attending: INTERNAL MEDICINE | Admitting: INTERNAL MEDICINE

## 2019-02-15 ENCOUNTER — HOSPITAL ENCOUNTER (OUTPATIENT)
Dept: ONCOLOGY | Facility: CLINIC | Age: 64
Setting detail: INFUSION SERIES
Discharge: HOME | End: 2019-02-15
Attending: INTERNAL MEDICINE | Admitting: INTERNAL MEDICINE

## 2019-02-15 ENCOUNTER — CLINICAL SUPPORT (OUTPATIENT)
Dept: ONCOLOGY | Facility: HOSPITAL | Age: 64
End: 2019-02-15

## 2019-02-15 LAB
ALBUMIN SERPL-MCNC: 3.5 G/DL (ref 3.5–4.8)
ALBUMIN/GLOB SERPL: 1.3 {RATIO} (ref 1–1.7)
ALP SERPL-CCNC: 119 IU/L (ref 32–91)
ALT SERPL-CCNC: 13 IU/L (ref 14–54)
ANION GAP SERPL CALC-SCNC: 11.9 MMOL/L (ref 10–20)
AST SERPL-CCNC: 28 IU/L (ref 15–41)
BILIRUB SERPL-MCNC: 0.4 MG/DL (ref 0.3–1.2)
BUN SERPL-MCNC: 16 MG/DL (ref 8–20)
BUN/CREAT SERPL: 14.5 (ref 5.4–26.2)
CALCIUM SERPL-MCNC: 8.8 MG/DL (ref 8.9–10.3)
CHLORIDE SERPL-SCNC: 105 MMOL/L (ref 101–111)
CONV CO2: 23 MMOL/L (ref 22–32)
CONV TOTAL PROTEIN: 6.2 G/DL (ref 6.1–7.9)
CREAT BLDA-MCNC: 1.1 MG/DL (ref 0.6–1.3)
CREAT UR-MCNC: 1.1 MG/DL (ref 0.4–1)
GLOBULIN UR ELPH-MCNC: 2.7 G/DL (ref 2.5–3.8)
GLUCOSE SERPL-MCNC: 65 MG/DL (ref 65–99)
MAGNESIUM SERPL-MCNC: 1.6 MG/DL (ref 1.8–2.5)
POTASSIUM SERPL-SCNC: 3.9 MMOL/L (ref 3.6–5.1)
SODIUM SERPL-SCNC: 136 MMOL/L (ref 136–144)

## 2019-02-15 NOTE — PROGRESS NOTES
PATIENTS ONCOLOGY RECORD LOCATED IN [facility]      Subjective     Name:  EDE ROBLERO     Date:  02/15/2019  Address:  [facility] [place] [address]  Home: [unfilled]  :  1955 AGE:  63 y.o.        RECORDS OBTAINED:  Patients Oncology Record is located in [facility]

## 2019-02-20 ENCOUNTER — HOSPITAL ENCOUNTER (OUTPATIENT)
Dept: ONCOLOGY | Facility: HOSPITAL | Age: 64
Discharge: HOME | End: 2019-02-20
Attending: INTERNAL MEDICINE | Admitting: INTERNAL MEDICINE

## 2019-02-20 ENCOUNTER — HOSPITAL ENCOUNTER (OUTPATIENT)
Dept: ONCOLOGY | Facility: CLINIC | Age: 64
Setting detail: INFUSION SERIES
Discharge: HOME | End: 2019-02-20
Attending: INTERNAL MEDICINE | Admitting: INTERNAL MEDICINE

## 2019-02-20 ENCOUNTER — CLINICAL SUPPORT (OUTPATIENT)
Dept: ONCOLOGY | Facility: HOSPITAL | Age: 64
End: 2019-02-20

## 2019-02-20 LAB — MAGNESIUM SERPL-MCNC: 1.5 MG/DL (ref 1.8–2.5)

## 2019-02-20 NOTE — PROGRESS NOTES
PATIENTS ONCOLOGY RECORD LOCATED IN [facility]      Subjective     Name:  EDE ROBLERO     Date:  2019  Address:  [facility] [place] [address]  Home: [unfilled]  :  1955 AGE:  63 y.o.        RECORDS OBTAINED:  Patients Oncology Record is located in [facility]

## 2019-02-28 ENCOUNTER — HOSPITAL ENCOUNTER (OUTPATIENT)
Dept: ONCOLOGY | Facility: CLINIC | Age: 64
Setting detail: INFUSION SERIES
Discharge: HOME | End: 2019-02-28
Attending: INTERNAL MEDICINE | Admitting: INTERNAL MEDICINE

## 2019-02-28 ENCOUNTER — HOSPITAL ENCOUNTER (OUTPATIENT)
Dept: ONCOLOGY | Facility: HOSPITAL | Age: 64
Discharge: HOME | End: 2019-02-28
Attending: INTERNAL MEDICINE | Admitting: INTERNAL MEDICINE

## 2019-02-28 ENCOUNTER — HOSPITAL ENCOUNTER (OUTPATIENT)
Dept: ONCOLOGY | Facility: HOSPITAL | Age: 64
Discharge: HOME | End: 2019-02-28

## 2019-02-28 LAB — MAGNESIUM SERPL-MCNC: 1.3 MG/DL (ref 1.8–2.5)

## 2019-03-06 ENCOUNTER — HOSPITAL ENCOUNTER (OUTPATIENT)
Dept: ONCOLOGY | Facility: HOSPITAL | Age: 64
Discharge: HOME | End: 2019-03-06
Attending: INTERNAL MEDICINE | Admitting: INTERNAL MEDICINE

## 2019-03-06 ENCOUNTER — HOSPITAL ENCOUNTER (OUTPATIENT)
Dept: ONCOLOGY | Facility: CLINIC | Age: 64
Setting detail: INFUSION SERIES
Discharge: HOME | End: 2019-03-06
Attending: INTERNAL MEDICINE | Admitting: INTERNAL MEDICINE

## 2019-03-06 ENCOUNTER — CLINICAL SUPPORT (OUTPATIENT)
Dept: ONCOLOGY | Facility: HOSPITAL | Age: 64
End: 2019-03-06

## 2019-03-06 NOTE — PROGRESS NOTES
PATIENTS ONCOLOGY RECORD LOCATED IN [facility]      Subjective     Name:  EDE ROBLERO     Date:  2019  Address:  [facility] [place] [address]  Home: [unfilled]  :  1955 AGE:  63 y.o.        RECORDS OBTAINED:  Patients Oncology Record is located in [facility]

## 2019-03-08 ENCOUNTER — CLINICAL SUPPORT (OUTPATIENT)
Dept: ONCOLOGY | Facility: HOSPITAL | Age: 64
End: 2019-03-08

## 2019-03-08 ENCOUNTER — HOSPITAL ENCOUNTER (OUTPATIENT)
Dept: ONCOLOGY | Facility: HOSPITAL | Age: 64
Discharge: HOME | End: 2019-03-08
Attending: INTERNAL MEDICINE | Admitting: INTERNAL MEDICINE

## 2019-03-08 ENCOUNTER — HOSPITAL ENCOUNTER (OUTPATIENT)
Dept: ONCOLOGY | Facility: CLINIC | Age: 64
Setting detail: INFUSION SERIES
Discharge: HOME | End: 2019-03-08
Attending: INTERNAL MEDICINE | Admitting: INTERNAL MEDICINE

## 2019-03-08 LAB — MAGNESIUM SERPL-MCNC: 1.4 MG/DL (ref 1.8–2.5)

## 2019-03-08 NOTE — PROGRESS NOTES
PATIENTS ONCOLOGY RECORD LOCATED IN [facility]      Subjective     Name:  EDE ROBLERO     Date:  2019  Address:  [facility] [place] [address]  Home: [unfilled]  :  1955 AGE:  63 y.o.        RECORDS OBTAINED:  Patients Oncology Record is located in [facility]

## 2019-03-15 ENCOUNTER — HOSPITAL ENCOUNTER (OUTPATIENT)
Dept: ONCOLOGY | Facility: CLINIC | Age: 64
Setting detail: INFUSION SERIES
Discharge: HOME | End: 2019-03-15
Attending: INTERNAL MEDICINE | Admitting: INTERNAL MEDICINE

## 2019-03-15 ENCOUNTER — HOSPITAL ENCOUNTER (OUTPATIENT)
Dept: ONCOLOGY | Facility: HOSPITAL | Age: 64
Discharge: HOME | End: 2019-03-15
Attending: INTERNAL MEDICINE | Admitting: INTERNAL MEDICINE

## 2019-03-15 ENCOUNTER — CLINICAL SUPPORT (OUTPATIENT)
Dept: ONCOLOGY | Facility: HOSPITAL | Age: 64
End: 2019-03-15

## 2019-03-15 LAB
ALBUMIN SERPL-MCNC: 3.6 G/DL (ref 3.5–4.8)
ALBUMIN/GLOB SERPL: 1.5 {RATIO} (ref 1–1.7)
ALP SERPL-CCNC: 113 IU/L (ref 32–91)
ALT SERPL-CCNC: 11 IU/L (ref 14–54)
ANION GAP SERPL CALC-SCNC: 15.6 MMOL/L (ref 10–20)
AST SERPL-CCNC: 27 IU/L (ref 15–41)
BILIRUB SERPL-MCNC: 0.3 MG/DL (ref 0.3–1.2)
BUN SERPL-MCNC: 13 MG/DL (ref 8–20)
BUN/CREAT SERPL: 13 (ref 5.4–26.2)
CALCIUM SERPL-MCNC: 9 MG/DL (ref 8.9–10.3)
CHLORIDE SERPL-SCNC: 103 MMOL/L (ref 101–111)
CONV CO2: 21 MMOL/L (ref 22–32)
CONV TOTAL PROTEIN: 6 G/DL (ref 6.1–7.9)
CREAT BLDA-MCNC: 1 MG/DL (ref 0.6–1.3)
CREAT UR-MCNC: 1 MG/DL (ref 0.4–1)
GLOBULIN UR ELPH-MCNC: 2.4 G/DL (ref 2.5–3.8)
GLUCOSE SERPL-MCNC: 80 MG/DL (ref 65–99)
MAGNESIUM SERPL-MCNC: 1.5 MG/DL (ref 1.8–2.5)
POTASSIUM SERPL-SCNC: 3.6 MMOL/L (ref 3.6–5.1)
SODIUM SERPL-SCNC: 136 MMOL/L (ref 136–144)

## 2019-03-15 NOTE — PROGRESS NOTES
PATIENTS ONCOLOGY RECORD LOCATED IN [facility]      Subjective     Name:  EDE ROBLERO     Date:  03/15/2019  Address:  [facility] [place] [address]  Home: [unfilled]  :  1955 AGE:  63 y.o.        RECORDS OBTAINED:  Patients Oncology Record is located in [facility]

## 2019-03-22 ENCOUNTER — HOSPITAL ENCOUNTER (OUTPATIENT)
Dept: ONCOLOGY | Facility: CLINIC | Age: 64
Setting detail: INFUSION SERIES
Discharge: HOME | End: 2019-03-22
Attending: INTERNAL MEDICINE | Admitting: INTERNAL MEDICINE

## 2019-03-22 ENCOUNTER — CLINICAL SUPPORT (OUTPATIENT)
Dept: ONCOLOGY | Facility: HOSPITAL | Age: 64
End: 2019-03-22

## 2019-03-22 ENCOUNTER — HOSPITAL ENCOUNTER (OUTPATIENT)
Dept: ONCOLOGY | Facility: HOSPITAL | Age: 64
Discharge: HOME | End: 2019-03-22
Attending: INTERNAL MEDICINE | Admitting: INTERNAL MEDICINE

## 2019-03-22 LAB — MAGNESIUM SERPL-MCNC: 1.5 MG/DL (ref 1.8–2.5)

## 2019-03-22 NOTE — PROGRESS NOTES
Of course   PATIENTS ONCOLOGY RECORD LOCATED IN [email or web address]      Subjective     Name:  EDE ROBLERO     Date:  2019  Address:  [facility] [place] [address]  Home: [unfilled]  :  1955 AGE:  63 y.o.        RECORDS OBTAINED:  Patients Oncology Record is located in [facility]

## 2019-03-29 ENCOUNTER — HOSPITAL ENCOUNTER (OUTPATIENT)
Dept: ONCOLOGY | Facility: HOSPITAL | Age: 64
Discharge: HOME | End: 2019-03-29
Attending: INTERNAL MEDICINE | Admitting: INTERNAL MEDICINE

## 2019-03-29 ENCOUNTER — CLINICAL SUPPORT (OUTPATIENT)
Dept: ONCOLOGY | Facility: HOSPITAL | Age: 64
End: 2019-03-29

## 2019-03-29 ENCOUNTER — HOSPITAL ENCOUNTER (OUTPATIENT)
Dept: ONCOLOGY | Facility: CLINIC | Age: 64
Setting detail: INFUSION SERIES
Discharge: HOME | End: 2019-03-29
Attending: INTERNAL MEDICINE | Admitting: INTERNAL MEDICINE

## 2019-03-29 LAB — MAGNESIUM SERPL-MCNC: 1.3 MG/DL (ref 1.8–2.5)

## 2019-03-29 NOTE — PROGRESS NOTES
PATIENTS ONCOLOGY RECORD LOCATED IN [facility]      Subjective     Name:  EDE ROBLERO     Date:  2019  Address:  [facility] [place] [address]  Home: [unfilled]  :  1955 AGE:  63 y.o.        RECORDS OBTAINED:  Patients Oncology Record is located in [facility]

## 2019-04-05 ENCOUNTER — HOSPITAL ENCOUNTER (OUTPATIENT)
Dept: ONCOLOGY | Facility: HOSPITAL | Age: 64
Discharge: HOME | End: 2019-04-05
Attending: INTERNAL MEDICINE | Admitting: INTERNAL MEDICINE

## 2019-04-05 ENCOUNTER — CLINICAL SUPPORT (OUTPATIENT)
Dept: ONCOLOGY | Facility: HOSPITAL | Age: 64
End: 2019-04-05

## 2019-04-05 ENCOUNTER — HOSPITAL ENCOUNTER (OUTPATIENT)
Dept: ONCOLOGY | Facility: CLINIC | Age: 64
Setting detail: INFUSION SERIES
Discharge: HOME | End: 2019-04-05
Attending: INTERNAL MEDICINE | Admitting: INTERNAL MEDICINE

## 2019-04-05 LAB — MAGNESIUM SERPL-MCNC: 1.4 MG/DL (ref 1.8–2.5)

## 2019-04-05 NOTE — PROGRESS NOTES
PATIENTS ONCOLOGY RECORD LOCATED IN [facility]      Subjective     Name:  EDE ROBLERO     Date:  2019  Address:  [facility] [place] [address]  Home: [unfilled]  :  1955 AGE:  63 y.o.        RECORDS OBTAINED:  Patients Oncology Record is located in [facility]

## 2019-04-10 ENCOUNTER — HOSPITAL ENCOUNTER (OUTPATIENT)
Dept: ONCOLOGY | Facility: CLINIC | Age: 64
Setting detail: INFUSION SERIES
Discharge: HOME | End: 2019-04-10
Attending: NURSE PRACTITIONER | Admitting: NURSE PRACTITIONER

## 2019-04-10 ENCOUNTER — CLINICAL SUPPORT (OUTPATIENT)
Dept: ONCOLOGY | Facility: HOSPITAL | Age: 64
End: 2019-04-10

## 2019-04-10 NOTE — PROGRESS NOTES
PATIENTS ONCOLOGY RECORD LOCATED IN [facility]      Subjective     Name:  EDE ROBLERO     Date:  04/10/2019  Address:  [facility] [place] [address]  Home: [unfilled]  :  1955 AGE:  63 y.o.        RECORDS OBTAINED:  Patients Oncology Record is located in [facility]

## 2019-04-12 ENCOUNTER — HOSPITAL ENCOUNTER (OUTPATIENT)
Dept: ONCOLOGY | Facility: CLINIC | Age: 64
Setting detail: INFUSION SERIES
Discharge: HOME | End: 2019-04-12
Attending: INTERNAL MEDICINE | Admitting: INTERNAL MEDICINE

## 2019-04-12 ENCOUNTER — HOSPITAL ENCOUNTER (OUTPATIENT)
Dept: ONCOLOGY | Facility: HOSPITAL | Age: 64
Discharge: HOME | End: 2019-04-12
Attending: INTERNAL MEDICINE | Admitting: INTERNAL MEDICINE

## 2019-04-12 ENCOUNTER — CLINICAL SUPPORT (OUTPATIENT)
Dept: ONCOLOGY | Facility: HOSPITAL | Age: 64
End: 2019-04-12

## 2019-04-12 LAB
ALBUMIN SERPL-MCNC: 3.5 G/DL (ref 3.5–4.8)
ALBUMIN/GLOB SERPL: 1.6 {RATIO} (ref 1–1.7)
ALP SERPL-CCNC: 103 IU/L (ref 32–91)
ALT SERPL-CCNC: 13 IU/L (ref 14–54)
ANION GAP SERPL CALC-SCNC: 15 MMOL/L (ref 10–20)
AST SERPL-CCNC: 26 IU/L (ref 15–41)
BILIRUB SERPL-MCNC: 0.7 MG/DL (ref 0.3–1.2)
BUN SERPL-MCNC: 17 MG/DL (ref 8–20)
BUN/CREAT SERPL: 18.9 (ref 5.4–26.2)
CALCIUM SERPL-MCNC: 8.8 MG/DL (ref 8.9–10.3)
CHLORIDE SERPL-SCNC: 104 MMOL/L (ref 101–111)
CONV CO2: 24 MMOL/L (ref 22–32)
CONV TOTAL PROTEIN: 5.7 G/DL (ref 6.1–7.9)
CREAT BLDA-MCNC: 1 MG/DL (ref 0.6–1.3)
CREAT UR-MCNC: 0.9 MG/DL (ref 0.4–1)
GLOBULIN UR ELPH-MCNC: 2.2 G/DL (ref 2.5–3.8)
GLUCOSE SERPL-MCNC: 78 MG/DL (ref 65–99)
MAGNESIUM SERPL-MCNC: 1.5 MG/DL (ref 1.8–2.5)
POTASSIUM SERPL-SCNC: 4 MMOL/L (ref 3.6–5.1)
SODIUM SERPL-SCNC: 139 MMOL/L (ref 136–144)

## 2019-04-12 NOTE — PROGRESS NOTES
PATIENTS ONCOLOGY RECORD LOCATED IN [facility]      Subjective     Name:  EDE ROBLERO     Date:  2019  Address:  [facility] [place] [address]  Home: [unfilled]  :  1955 AGE:  63 y.o.        RECORDS OBTAINED:  Patients Oncology Record is located in [facility]

## 2019-04-16 ENCOUNTER — HOSPITAL ENCOUNTER (OUTPATIENT)
Dept: PAIN MEDICINE | Facility: HOSPITAL | Age: 64
Discharge: HOME | End: 2019-04-16
Attending: PHYSICAL MEDICINE & REHABILITATION | Admitting: PHYSICAL MEDICINE & REHABILITATION

## 2019-04-19 ENCOUNTER — HOSPITAL ENCOUNTER (OUTPATIENT)
Dept: ONCOLOGY | Facility: CLINIC | Age: 64
Setting detail: INFUSION SERIES
Discharge: HOME | End: 2019-04-19
Attending: INTERNAL MEDICINE | Admitting: INTERNAL MEDICINE

## 2019-04-19 ENCOUNTER — HOSPITAL ENCOUNTER (OUTPATIENT)
Dept: ONCOLOGY | Facility: HOSPITAL | Age: 64
Discharge: HOME | End: 2019-04-19
Attending: INTERNAL MEDICINE | Admitting: INTERNAL MEDICINE

## 2019-04-19 LAB — MAGNESIUM SERPL-MCNC: 1.3 MG/DL (ref 1.8–2.5)

## 2019-04-26 ENCOUNTER — CLINICAL SUPPORT (OUTPATIENT)
Dept: ONCOLOGY | Facility: HOSPITAL | Age: 64
End: 2019-04-26

## 2019-04-26 ENCOUNTER — HOSPITAL ENCOUNTER (OUTPATIENT)
Dept: ONCOLOGY | Facility: CLINIC | Age: 64
Setting detail: INFUSION SERIES
Discharge: HOME | End: 2019-04-26
Attending: INTERNAL MEDICINE | Admitting: INTERNAL MEDICINE

## 2019-04-26 ENCOUNTER — HOSPITAL ENCOUNTER (OUTPATIENT)
Dept: ONCOLOGY | Facility: HOSPITAL | Age: 64
Discharge: HOME | End: 2019-04-26
Attending: INTERNAL MEDICINE | Admitting: INTERNAL MEDICINE

## 2019-04-26 LAB — MAGNESIUM SERPL-MCNC: 1.4 MG/DL (ref 1.8–2.5)

## 2019-04-26 NOTE — PROGRESS NOTES
PATIENTS ONCOLOGY RECORD LOCATED IN [facility]      Subjective     Name:  EDE ROBLERO     Date:  2019  Address:  [facility] [place] [address]  Home: [unfilled]  :  1955 AGE:  63 y.o.        RECORDS OBTAINED:  Patients Oncology Record is located in [facility]

## 2019-05-03 ENCOUNTER — CLINICAL SUPPORT (OUTPATIENT)
Dept: ONCOLOGY | Facility: HOSPITAL | Age: 64
End: 2019-05-03

## 2019-05-03 ENCOUNTER — HOSPITAL ENCOUNTER (OUTPATIENT)
Dept: ONCOLOGY | Facility: CLINIC | Age: 64
Setting detail: INFUSION SERIES
Discharge: HOME | End: 2019-05-03
Attending: INTERNAL MEDICINE | Admitting: INTERNAL MEDICINE

## 2019-05-03 ENCOUNTER — HOSPITAL ENCOUNTER (OUTPATIENT)
Dept: ONCOLOGY | Facility: HOSPITAL | Age: 64
Discharge: HOME | End: 2019-05-03
Attending: INTERNAL MEDICINE | Admitting: INTERNAL MEDICINE

## 2019-05-03 NOTE — PROGRESS NOTES
PATIENTS ONCOLOGY RECORD LOCATED IN [facility]      Subjective     Name:  EDE ROBLERO     Date:  2019  Address:  [facility] [place] [address]  Home: [unfilled]  :  1955 AGE:  63 y.o.        RECORDS OBTAINED:  Patients Oncology Record is located in [facility]

## 2019-05-08 ENCOUNTER — HOSPITAL ENCOUNTER (OUTPATIENT)
Dept: ONCOLOGY | Facility: CLINIC | Age: 64
Setting detail: INFUSION SERIES
Discharge: HOME | End: 2019-05-08
Attending: INTERNAL MEDICINE | Admitting: INTERNAL MEDICINE

## 2019-05-08 ENCOUNTER — CLINICAL SUPPORT (OUTPATIENT)
Dept: ONCOLOGY | Facility: HOSPITAL | Age: 64
End: 2019-05-08

## 2019-05-08 ENCOUNTER — HOSPITAL ENCOUNTER (OUTPATIENT)
Dept: ONCOLOGY | Facility: HOSPITAL | Age: 64
Discharge: HOME | End: 2019-05-08
Attending: INTERNAL MEDICINE | Admitting: INTERNAL MEDICINE

## 2019-05-08 LAB
ALBUMIN SERPL-MCNC: 3.7 G/DL (ref 3.5–4.8)
ALBUMIN/GLOB SERPL: 1.4 {RATIO} (ref 1–1.7)
ALP SERPL-CCNC: 113 IU/L (ref 32–91)
ALT SERPL-CCNC: 13 IU/L (ref 14–54)
ANION GAP SERPL CALC-SCNC: 13.2 MMOL/L (ref 10–20)
AST SERPL-CCNC: 28 IU/L (ref 15–41)
BILIRUB SERPL-MCNC: 0.6 MG/DL (ref 0.3–1.2)
BUN SERPL-MCNC: 11 MG/DL (ref 8–20)
BUN/CREAT SERPL: 10 (ref 5.4–26.2)
CALCIUM SERPL-MCNC: 9.3 MG/DL (ref 8.9–10.3)
CHLORIDE SERPL-SCNC: 105 MMOL/L (ref 101–111)
CONV CO2: 25 MMOL/L (ref 22–32)
CONV TOTAL PROTEIN: 6.3 G/DL (ref 6.1–7.9)
CREAT UR-MCNC: 1.1 MG/DL (ref 0.4–1)
FERRITIN SERPL-MCNC: 64 NG/ML (ref 11–307)
GLOBULIN UR ELPH-MCNC: 2.6 G/DL (ref 2.5–3.8)
GLUCOSE SERPL-MCNC: 108 MG/DL (ref 65–99)
POTASSIUM SERPL-SCNC: 4.2 MMOL/L (ref 3.6–5.1)
SODIUM SERPL-SCNC: 139 MMOL/L (ref 136–144)

## 2019-05-08 NOTE — PROGRESS NOTES
PATIENTS ONCOLOGY RECORD LOCATED IN [facility]      Subjective     Name:  EDE ROBLERO     Date:  2019  Address:  [facility] [place] [address]  Home: [unfilled]  :  1955 AGE:  63 y.o.        RECORDS OBTAINED:  [facility] On[facility]

## 2019-05-10 ENCOUNTER — HOSPITAL ENCOUNTER (OUTPATIENT)
Dept: ONCOLOGY | Facility: CLINIC | Age: 64
Setting detail: INFUSION SERIES
Discharge: HOME | End: 2019-05-10
Attending: INTERNAL MEDICINE | Admitting: INTERNAL MEDICINE

## 2019-05-10 ENCOUNTER — CLINICAL SUPPORT (OUTPATIENT)
Dept: ONCOLOGY | Facility: HOSPITAL | Age: 64
End: 2019-05-10

## 2019-05-10 ENCOUNTER — HOSPITAL ENCOUNTER (OUTPATIENT)
Dept: ONCOLOGY | Facility: HOSPITAL | Age: 64
Discharge: HOME | End: 2019-05-10
Attending: INTERNAL MEDICINE | Admitting: INTERNAL MEDICINE

## 2019-05-10 LAB
ALBUMIN SERPL-MCNC: 3.5 G/DL (ref 3.5–4.8)
ALBUMIN/GLOB SERPL: 1.5 {RATIO} (ref 1–1.7)
ALP SERPL-CCNC: 111 IU/L (ref 32–91)
ALT SERPL-CCNC: 15 IU/L (ref 14–54)
ANION GAP SERPL CALC-SCNC: 14.6 MMOL/L (ref 10–20)
AST SERPL-CCNC: 32 IU/L (ref 15–41)
BILIRUB SERPL-MCNC: 0.6 MG/DL (ref 0.3–1.2)
BUN SERPL-MCNC: 13 MG/DL (ref 8–20)
BUN/CREAT SERPL: 13 (ref 5.4–26.2)
CALCIUM SERPL-MCNC: 8.7 MG/DL (ref 8.9–10.3)
CHLORIDE SERPL-SCNC: 104 MMOL/L (ref 101–111)
CONV CO2: 23 MMOL/L (ref 22–32)
CONV TOTAL PROTEIN: 5.9 G/DL (ref 6.1–7.9)
CREAT BLDA-MCNC: 1 MG/DL (ref 0.6–1.3)
CREAT UR-MCNC: 1 MG/DL (ref 0.4–1)
GLOBULIN UR ELPH-MCNC: 2.4 G/DL (ref 2.5–3.8)
GLUCOSE SERPL-MCNC: 97 MG/DL (ref 65–99)
MAGNESIUM SERPL-MCNC: 1.3 MG/DL (ref 1.8–2.5)
POTASSIUM SERPL-SCNC: 3.6 MMOL/L (ref 3.6–5.1)
SODIUM SERPL-SCNC: 138 MMOL/L (ref 136–144)

## 2019-05-10 NOTE — PROGRESS NOTES
PATIENTS ONCOLOGY RECORD LOCATED IN [facility]      Subjective     Name:  EDE ROBLERO     Date:  05/10/2019  Address:  [facility] [place] [address]  Home: [unfilled]  :  1955 AGE:  63 y.o.        RECORDS OBTAINED:  Patients Oncology Record is located in [facility]

## 2019-05-17 ENCOUNTER — HOSPITAL ENCOUNTER (OUTPATIENT)
Dept: ONCOLOGY | Facility: HOSPITAL | Age: 64
Discharge: HOME | End: 2019-05-17
Attending: INTERNAL MEDICINE | Admitting: INTERNAL MEDICINE

## 2019-05-17 ENCOUNTER — HOSPITAL ENCOUNTER (OUTPATIENT)
Dept: ONCOLOGY | Facility: CLINIC | Age: 64
Setting detail: INFUSION SERIES
Discharge: HOME | End: 2019-05-17
Attending: INTERNAL MEDICINE | Admitting: INTERNAL MEDICINE

## 2019-05-17 ENCOUNTER — CLINICAL SUPPORT (OUTPATIENT)
Dept: ONCOLOGY | Facility: HOSPITAL | Age: 64
End: 2019-05-17

## 2019-05-17 LAB — MAGNESIUM SERPL-MCNC: 1.4 MG/DL (ref 1.8–2.5)

## 2019-05-17 NOTE — PROGRESS NOTES
PATIENTS ONCOLOGY RECORD LOCATED IN [facility]      Subjective     Name:  EDE ROBLERO     Date:  2019  Address:  [facility] [place] [address]  Home: [unfilled]  :  1955 AGE:  63 y.o.        RECORDS OBTAINED:  Patients Oncology Record is located in [facility]

## 2019-05-24 ENCOUNTER — HOSPITAL ENCOUNTER (OUTPATIENT)
Dept: ONCOLOGY | Facility: CLINIC | Age: 64
Setting detail: INFUSION SERIES
Discharge: HOME | End: 2019-05-24
Attending: INTERNAL MEDICINE | Admitting: INTERNAL MEDICINE

## 2019-05-24 ENCOUNTER — CLINICAL SUPPORT (OUTPATIENT)
Dept: ONCOLOGY | Facility: HOSPITAL | Age: 64
End: 2019-05-24

## 2019-05-24 ENCOUNTER — HOSPITAL ENCOUNTER (OUTPATIENT)
Dept: ONCOLOGY | Facility: HOSPITAL | Age: 64
Discharge: HOME | End: 2019-05-24
Attending: INTERNAL MEDICINE | Admitting: INTERNAL MEDICINE

## 2019-05-24 LAB — MAGNESIUM SERPL-MCNC: 1.3 MG/DL (ref 1.8–2.5)

## 2019-05-24 NOTE — PROGRESS NOTES
PATIENTS ONCOLOGY RECORD LOCATED IN [facility]      Subjective     Name:  EDE ROBLERO     Date:  2019  Address:  [facility] [place] [address]  Home: [unfilled]  :  1955 AGE:  63 y.o.        RECORDS OBTAINED:  Patients Oncology Record is located in [facility]

## 2019-05-31 ENCOUNTER — CLINICAL SUPPORT (OUTPATIENT)
Dept: ONCOLOGY | Facility: HOSPITAL | Age: 64
End: 2019-05-31

## 2019-05-31 ENCOUNTER — HOSPITAL ENCOUNTER (OUTPATIENT)
Dept: ONCOLOGY | Facility: HOSPITAL | Age: 64
Discharge: HOME | End: 2019-05-31
Attending: INTERNAL MEDICINE | Admitting: INTERNAL MEDICINE

## 2019-05-31 ENCOUNTER — HOSPITAL ENCOUNTER (OUTPATIENT)
Dept: ONCOLOGY | Facility: CLINIC | Age: 64
Setting detail: INFUSION SERIES
Discharge: HOME | End: 2019-05-31
Attending: INTERNAL MEDICINE | Admitting: INTERNAL MEDICINE

## 2019-05-31 LAB — MAGNESIUM SERPL-MCNC: 1.5 MG/DL (ref 1.8–2.5)

## 2019-05-31 NOTE — PROGRESS NOTES
PATIENTS ONCOLOGY RECORD LOCATED IN [facility]      Subjective     Name:  EDE ROBLERO     Date:  2019  Address:  [facility] [place] [address]  Home: [unfilled]  :  1955 AGE:  63 y.o.        RECORDS OBTAINED:  Patients Oncology Record is located in [facility]

## 2019-06-04 ENCOUNTER — HOSPITAL ENCOUNTER (OUTPATIENT)
Dept: ONCOLOGY | Facility: CLINIC | Age: 64
Setting detail: INFUSION SERIES
Discharge: HOME | End: 2019-06-04
Attending: NURSE PRACTITIONER | Admitting: NURSE PRACTITIONER

## 2019-06-04 ENCOUNTER — HOSPITAL ENCOUNTER (OUTPATIENT)
Dept: ONCOLOGY | Facility: HOSPITAL | Age: 64
Discharge: HOME | End: 2019-06-04
Attending: NURSE PRACTITIONER | Admitting: NURSE PRACTITIONER

## 2019-06-04 LAB — MAGNESIUM SERPL-MCNC: 1.5 MG/DL (ref 1.8–2.5)

## 2019-06-05 ENCOUNTER — HOSPITAL ENCOUNTER (OUTPATIENT)
Dept: CARDIOLOGY | Facility: HOSPITAL | Age: 64
Discharge: HOME | End: 2019-06-05
Attending: INTERNAL MEDICINE | Admitting: INTERNAL MEDICINE

## 2019-06-07 ENCOUNTER — HOSPITAL ENCOUNTER (OUTPATIENT)
Dept: ONCOLOGY | Facility: CLINIC | Age: 64
Setting detail: INFUSION SERIES
Discharge: HOME | End: 2019-06-07
Attending: INTERNAL MEDICINE | Admitting: INTERNAL MEDICINE

## 2019-06-07 ENCOUNTER — HOSPITAL ENCOUNTER (OUTPATIENT)
Dept: ONCOLOGY | Facility: HOSPITAL | Age: 64
Discharge: HOME | End: 2019-06-07
Attending: INTERNAL MEDICINE | Admitting: INTERNAL MEDICINE

## 2019-06-07 LAB
ALBUMIN SERPL-MCNC: 3.5 G/DL (ref 3.5–4.8)
ALBUMIN/GLOB SERPL: 1.5 {RATIO} (ref 1–1.7)
ALP SERPL-CCNC: 104 IU/L (ref 32–91)
ALT SERPL-CCNC: 14 IU/L (ref 14–54)
ANION GAP SERPL CALC-SCNC: 12.8 MMOL/L (ref 10–20)
AST SERPL-CCNC: 27 IU/L (ref 15–41)
BILIRUB SERPL-MCNC: 0.4 MG/DL (ref 0.3–1.2)
BUN SERPL-MCNC: 14 MG/DL (ref 8–20)
BUN/CREAT SERPL: 14 (ref 5.4–26.2)
CALCIUM SERPL-MCNC: 9 MG/DL (ref 8.9–10.3)
CHLORIDE SERPL-SCNC: 107 MMOL/L (ref 101–111)
CONV CO2: 24 MMOL/L (ref 22–32)
CONV TOTAL PROTEIN: 5.8 G/DL (ref 6.1–7.9)
CREAT BLDA-MCNC: 0.9 MG/DL (ref 0.6–1.3)
CREAT UR-MCNC: 1 MG/DL (ref 0.4–1)
GLOBULIN UR ELPH-MCNC: 2.3 G/DL (ref 2.5–3.8)
GLUCOSE SERPL-MCNC: 117 MG/DL (ref 65–99)
MAGNESIUM SERPL-MCNC: 1.3 MG/DL (ref 1.8–2.5)
POTASSIUM SERPL-SCNC: 3.8 MMOL/L (ref 3.6–5.1)
SODIUM SERPL-SCNC: 140 MMOL/L (ref 136–144)

## 2019-06-11 ENCOUNTER — HOSPITAL ENCOUNTER (OUTPATIENT)
Dept: PAIN MEDICINE | Facility: HOSPITAL | Age: 64
Discharge: HOME | End: 2019-06-11
Attending: PHYSICAL MEDICINE & REHABILITATION | Admitting: PHYSICAL MEDICINE & REHABILITATION

## 2019-06-14 ENCOUNTER — HOSPITAL ENCOUNTER (OUTPATIENT)
Dept: ONCOLOGY | Facility: HOSPITAL | Age: 64
Discharge: HOME | End: 2019-06-14
Attending: INTERNAL MEDICINE | Admitting: INTERNAL MEDICINE

## 2019-06-14 VITALS — WEIGHT: 174 LBS | BODY MASS INDEX: 28.99 KG/M2 | HEIGHT: 65 IN

## 2019-06-14 DIAGNOSIS — E83.42 HYPOMAGNESEMIA: ICD-10-CM

## 2019-06-14 PROBLEM — C56.1 MALIGNANT NEOPLASM OF RIGHT OVARY (HCC): Status: ACTIVE | Noted: 2019-06-14

## 2019-06-14 LAB — MAGNESIUM SERPL-MCNC: 1.3 MG/DL (ref 1.8–2.5)

## 2019-06-14 RX ORDER — MAGNESIUM SULFATE HEPTAHYDRATE 40 MG/ML
2 INJECTION, SOLUTION INTRAVENOUS ONCE
Status: CANCELLED | OUTPATIENT
Start: 2019-06-21

## 2019-06-14 RX ORDER — SODIUM CHLORIDE 9 MG/ML
250 INJECTION, SOLUTION INTRAVENOUS ONCE
Status: CANCELLED | OUTPATIENT
Start: 2019-06-21

## 2019-06-14 RX ORDER — SODIUM CHLORIDE 0.9 % (FLUSH) 0.9 %
10 SYRINGE (ML) INJECTION AS NEEDED
Status: CANCELLED | OUTPATIENT
Start: 2019-06-17

## 2019-06-18 DIAGNOSIS — E83.42 HYPOMAGNESEMIA: Primary | ICD-10-CM

## 2019-06-18 RX ORDER — MAGNESIUM OXIDE 400 MG/1
400 TABLET ORAL 2 TIMES DAILY
Qty: 60 TABLET | Refills: 3 | Status: SHIPPED | OUTPATIENT
Start: 2019-06-18 | End: 2019-11-27 | Stop reason: SDUPTHER

## 2019-06-18 RX ORDER — MAGNESIUM OXIDE 400 MG/1
1 TABLET ORAL 2 TIMES DAILY
Refills: 2 | COMMUNITY
Start: 2019-04-01 | End: 2019-06-18 | Stop reason: SDUPTHER

## 2019-06-20 RX ORDER — OXYCODONE AND ACETAMINOPHEN 10; 325 MG/1; MG/1
1 TABLET ORAL EVERY 6 HOURS PRN
COMMUNITY
End: 2019-07-30

## 2019-06-20 RX ORDER — VALACYCLOVIR HYDROCHLORIDE 500 MG/1
1000 TABLET, FILM COATED ORAL DAILY
COMMUNITY
End: 2019-12-08

## 2019-06-20 RX ORDER — FAMOTIDINE 20 MG/1
20 TABLET, FILM COATED ORAL 2 TIMES DAILY
COMMUNITY
End: 2019-07-30 | Stop reason: SDUPTHER

## 2019-06-20 RX ORDER — PREGABALIN 75 MG/1
75 CAPSULE ORAL 3 TIMES DAILY
COMMUNITY
End: 2019-07-30 | Stop reason: SDUPTHER

## 2019-06-21 ENCOUNTER — HOSPITAL ENCOUNTER (OUTPATIENT)
Dept: ONCOLOGY | Facility: HOSPITAL | Age: 64
Discharge: HOME | End: 2019-06-21
Admitting: INTERNAL MEDICINE

## 2019-06-21 VITALS
TEMPERATURE: 97.7 F | SYSTOLIC BLOOD PRESSURE: 123 MMHG | DIASTOLIC BLOOD PRESSURE: 72 MMHG | HEART RATE: 56 BPM | WEIGHT: 179 LBS | HEIGHT: 65 IN | RESPIRATION RATE: 18 BRPM | BODY MASS INDEX: 29.82 KG/M2

## 2019-06-21 DIAGNOSIS — C56.1 MALIGNANT NEOPLASM OF RIGHT OVARY (HCC): ICD-10-CM

## 2019-06-21 DIAGNOSIS — E83.42 HYPOMAGNESEMIA: Primary | ICD-10-CM

## 2019-06-21 LAB — MAGNESIUM SERPL-MCNC: 1.5 MG/DL (ref 1.8–2.5)

## 2019-06-21 PROCEDURE — 36591 DRAW BLOOD OFF VENOUS DEVICE: CPT

## 2019-06-21 PROCEDURE — 83735 ASSAY OF MAGNESIUM: CPT | Performed by: INTERNAL MEDICINE

## 2019-06-21 PROCEDURE — 96365 THER/PROPH/DIAG IV INF INIT: CPT | Performed by: INTERNAL MEDICINE

## 2019-06-21 PROCEDURE — 25010000002 MAGNESIUM SULFATE 2 GM/50ML SOLUTION: Performed by: INTERNAL MEDICINE

## 2019-06-21 RX ORDER — ONDANSETRON 8 MG/1
8 TABLET, ORALLY DISINTEGRATING ORAL AS NEEDED
COMMUNITY
Start: 2018-04-23 | End: 2019-07-17 | Stop reason: SDUPTHER

## 2019-06-21 RX ORDER — TEMAZEPAM 15 MG/1
15 CAPSULE ORAL AS NEEDED
COMMUNITY
End: 2019-06-23 | Stop reason: SDUPTHER

## 2019-06-21 RX ORDER — FAMOTIDINE 40 MG/1
40 TABLET, FILM COATED ORAL
Refills: 3 | COMMUNITY
Start: 2019-05-13 | End: 2019-08-09 | Stop reason: SDUPTHER

## 2019-06-21 RX ORDER — HYDROCHLOROTHIAZIDE 25 MG/1
25 TABLET ORAL DAILY
Status: ON HOLD | COMMUNITY
Start: 2017-04-05 | End: 2019-12-08

## 2019-06-21 RX ORDER — POTASSIUM CHLORIDE 20 MEQ/1
20 TABLET, EXTENDED RELEASE ORAL DAILY
COMMUNITY
Start: 2018-12-14 | End: 2019-09-16 | Stop reason: SDUPTHER

## 2019-06-21 RX ORDER — OXYCODONE HYDROCHLORIDE 5 MG/1
5 CAPSULE ORAL AS NEEDED
COMMUNITY
Start: 2017-04-05 | End: 2019-07-30 | Stop reason: SDUPTHER

## 2019-06-21 RX ORDER — SODIUM CHLORIDE 9 MG/ML
250 INJECTION, SOLUTION INTRAVENOUS ONCE
Status: DISCONTINUED | OUTPATIENT
Start: 2019-06-21 | End: 2019-06-22 | Stop reason: HOSPADM

## 2019-06-21 RX ORDER — CALCIUM CARBONATE/VITAMIN D3 500-10/5ML
800 LIQUID (ML) ORAL 3 TIMES DAILY
COMMUNITY
Start: 2017-06-12 | End: 2019-07-02 | Stop reason: SDUPTHER

## 2019-06-21 RX ORDER — OXYCODONE HYDROCHLORIDE 10 MG/1
TABLET ORAL
Refills: 0 | COMMUNITY
Start: 2019-06-15 | End: 2019-08-13 | Stop reason: SDUPTHER

## 2019-06-21 RX ORDER — SODIUM CHLORIDE 0.9 % (FLUSH) 0.9 %
10 SYRINGE (ML) INJECTION AS NEEDED
Status: DISCONTINUED | OUTPATIENT
Start: 2019-06-21 | End: 2019-06-22 | Stop reason: HOSPADM

## 2019-06-21 RX ORDER — SODIUM CHLORIDE 0.9 % (FLUSH) 0.9 %
10 SYRINGE (ML) INJECTION AS NEEDED
Status: CANCELLED | OUTPATIENT
Start: 2019-06-21

## 2019-06-21 RX ORDER — LISINOPRIL 20 MG/1
20 TABLET ORAL DAILY
COMMUNITY
End: 2019-12-08

## 2019-06-21 RX ORDER — MAGNESIUM SULFATE HEPTAHYDRATE 40 MG/ML
2 INJECTION, SOLUTION INTRAVENOUS ONCE
Status: COMPLETED | OUTPATIENT
Start: 2019-06-21 | End: 2019-06-21

## 2019-06-21 RX ORDER — ATORVASTATIN CALCIUM 20 MG/1
20 TABLET, FILM COATED ORAL
Refills: 3 | COMMUNITY
Start: 2019-05-11 | End: 2021-01-01

## 2019-06-21 RX ADMIN — Medication 10 ML: at 13:48

## 2019-06-21 RX ADMIN — MAGNESIUM SULFATE HEPTAHYDRATE 2 G: 40 INJECTION, SOLUTION INTRAVENOUS at 12:50

## 2019-06-21 RX ADMIN — SODIUM CHLORIDE, PRESERVATIVE FREE 500 UNITS: 5 INJECTION INTRAVENOUS at 13:48

## 2019-06-23 RX ORDER — TEMAZEPAM 15 MG/1
CAPSULE ORAL
Qty: 30 CAPSULE | Refills: 1 | Status: SHIPPED | OUTPATIENT
Start: 2019-06-23 | End: 2019-08-02 | Stop reason: SDUPTHER

## 2019-06-27 DIAGNOSIS — E83.42 HYPOMAGNESEMIA: ICD-10-CM

## 2019-06-27 RX ORDER — SODIUM CHLORIDE 9 MG/ML
250 INJECTION, SOLUTION INTRAVENOUS ONCE
Status: CANCELLED | OUTPATIENT
Start: 2019-06-28

## 2019-06-27 RX ORDER — MAGNESIUM SULFATE HEPTAHYDRATE 40 MG/ML
2 INJECTION, SOLUTION INTRAVENOUS ONCE
Status: CANCELLED | OUTPATIENT
Start: 2019-06-28

## 2019-06-28 ENCOUNTER — HOSPITAL ENCOUNTER (OUTPATIENT)
Dept: ONCOLOGY | Facility: HOSPITAL | Age: 64
Setting detail: INFUSION SERIES
Discharge: HOME | End: 2019-06-28

## 2019-06-28 VITALS
SYSTOLIC BLOOD PRESSURE: 102 MMHG | HEART RATE: 64 BPM | HEIGHT: 65 IN | DIASTOLIC BLOOD PRESSURE: 68 MMHG | OXYGEN SATURATION: 98 % | RESPIRATION RATE: 18 BRPM | TEMPERATURE: 97.9 F | WEIGHT: 180 LBS | BODY MASS INDEX: 29.99 KG/M2

## 2019-06-28 DIAGNOSIS — E83.42 HYPOMAGNESEMIA: Primary | ICD-10-CM

## 2019-06-28 LAB — MAGNESIUM SERPL-MCNC: 1.5 MG/DL (ref 1.8–2.5)

## 2019-06-28 PROCEDURE — 83735 ASSAY OF MAGNESIUM: CPT | Performed by: NURSE PRACTITIONER

## 2019-06-28 PROCEDURE — 25010000002 MAGNESIUM SULFATE 2 GM/50ML SOLUTION: Performed by: NURSE PRACTITIONER

## 2019-06-28 PROCEDURE — 96365 THER/PROPH/DIAG IV INF INIT: CPT | Performed by: INTERNAL MEDICINE

## 2019-06-28 RX ORDER — MAGNESIUM SULFATE HEPTAHYDRATE 40 MG/ML
2 INJECTION, SOLUTION INTRAVENOUS ONCE
Status: COMPLETED | OUTPATIENT
Start: 2019-06-28 | End: 2019-06-28

## 2019-06-28 RX ORDER — SODIUM CHLORIDE 9 MG/ML
250 INJECTION, SOLUTION INTRAVENOUS ONCE
Status: COMPLETED | OUTPATIENT
Start: 2019-06-28 | End: 2019-06-28

## 2019-06-28 RX ADMIN — SODIUM CHLORIDE 250 ML: 900 INJECTION, SOLUTION INTRAVENOUS at 14:10

## 2019-06-28 RX ADMIN — MAGNESIUM SULFATE HEPTAHYDRATE 2 G: 40 INJECTION, SOLUTION INTRAVENOUS at 14:10

## 2019-07-01 DIAGNOSIS — C56.1 MALIGNANT NEOPLASM OF RIGHT OVARY (HCC): Primary | ICD-10-CM

## 2019-07-01 PROBLEM — K90.49 MALABSORPTION DUE TO INTOLERANCE, NOT ELSEWHERE CLASSIFIED: Status: ACTIVE | Noted: 2019-07-01

## 2019-07-01 PROBLEM — D51.0 PERNICIOUS ANEMIA: Status: ACTIVE | Noted: 2019-07-01

## 2019-07-01 PROBLEM — T45.1X5A PANCYTOPENIA DUE TO ANTINEOPLASTIC CHEMOTHERAPY (HCC): Status: ACTIVE | Noted: 2019-07-01

## 2019-07-01 PROBLEM — D50.9 IDA (IRON DEFICIENCY ANEMIA): Status: ACTIVE | Noted: 2019-07-01

## 2019-07-01 PROBLEM — D61.810 PANCYTOPENIA DUE TO ANTINEOPLASTIC CHEMOTHERAPY (HCC): Status: ACTIVE | Noted: 2019-07-01

## 2019-07-01 PROBLEM — I82.622 LEFT UPPER EXTREMITY DEEP VEIN THROMBOSIS (HCC): Status: ACTIVE | Noted: 2019-07-01

## 2019-07-01 NOTE — PROGRESS NOTES
Hematology/Oncology Outpatient Follow Up    Tahira Zimmerman  1955    Primary Care Physician: Noemy Queen MD  Referring Physician: Noemy Queen MD    Chief Complaint   Patient presents with   • Ovarian Cancer     follow up         History of Present Illness:   1. Recurrent ovarian cancer diagnosis established in June 2013.  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  The patient was treated with surgery and then postoperatively with adjuvant chemotherapy consisting of Carboplatin and Taxol.  Six months later, she had recurrence of disease intra-abdominally between the rectum and vagina, which was treated with intraabdominal peritoneal chemotherapy.  She had been free of disease since that time.  She reported feeling a mass in the left side of her abdomen approximately six months ago.  This gradually grew and in early June 2013 she had her daughter feel the mass.  The daughter works for Dr. David Rogers and the patient at that time also complained of intermittent rectal bleeding.  Consultation with Dr. David Rogers was obtained.  The patient had a CT scan of the abdomen and pelvis performed on 06/26/13 revealing left lower quadrant mass measuring 9.7 x 9.3 x 6 cm demonstrating areas of dense calcification, soft tissue density and cystic density within it.  Stromal tumor is felt to be most likely a possibly fibroma.  It is generated with necrosis and calcification.  Possible palpable mass in the rectus muscle is seen with calcification and deformity of the rectus muscle and just below this a second mass intra-abdominally was seen measuring 2 cm in the greatest diameter suspicious for second intraabdominal tumor.  The mass separate from the largest mass measuring 2.6 cm in the dependent portion of pelvis is seen on the right of the midline.  No retroperitoneal lymphadenopathy was seen.  No free air,  free fluid or other abnormality was present.  The patient was scheduled for an outpatient colonoscopy, but had syncopal episode while sitting in the toilet the night before and was brought to the emergency room early in the morning by her daughter and son-in-law.  The patient had apparently stopped breathing for a few seconds and did not have a pulse, but started breathing before CPR could be initiated.  In the emergency room, the patient had an EKG revealing sinus rhythm nonspecific T-wave flattening; metabolic panel revealed a glucose of 148, creatinine of 1.3, CBC was normal.  She was treated with intravenous fluid.  The patient’s case was then discussed with Dr. Anabell Monique and patient was subsequently admitted to [facility].  The patient underwent a colonoscopy by Dr. David Rogers on 06/27/13 revealing sigmoid diverticulosis and grade II internal and external hemorrhoids, but no mass or colitis was seen.  CT-guided biopsy of the mass was performed on 06/27/13 as well revealing metastatic papillary adenocarcinoma with numerous psammoma bodies.  Pathology comment stated prior records were not available; however, with history of ovarian cancer the current biopsy would be consistent with metastases from that malignancy.  Oncology consult was obtained and I initially saw the patient on 06/27/2013 where the patient had claimed to have little bit of pain in the area of disease.  She reported being frequently constipated, denies any weight loss or fevers.  She did report having some night sweats, but thought that was because of her menopause.  On 06/27/13 metastatic workup including labs and CT scans were initiated.  CT scan of the chest on 06/27/13 revealed no acute disease in the chest.  A 1.4 cm size focal area of decreased density was noted in the lateral aspect of the right lobe of the liver consistent with a benign cyst or hemangioma.  There was a very small hiatal hernia measuring 2.2 cm in  diameter.  A CT scan of the head performed 06/27/13 revealed no acute intracranial abnormality noted.  CA-125 was 40 units/ml (0-35), CA 19-9 was 11.8 units/ml (0-35), CEA level was 0.8 ng/ml (0-3), TSH level was 1.09 IU/ml (0.34-5.6).  The patient was in workup for the syncopal episode, a carotid Doppler was performed on June 28 revealing an essentially normal study showing a reduction in diameter from 0-15% bilaterally.  A Holter monitor was completed on 06/27/13, which was unremarkable except for a few premature atrial contractions.  The patient was felt stable and subsequently was discharged home on 06/28/13.  Post discharge, the patient was seen at [facility] on 07/05/13 by Dr. Kathryn Thrasher who discussed treatment options with the patient including surgery.  The patient is in the office today 07/16/13 in follow up post hospitalization.  She is reporting that surgery is planned for July 30th of this month at .  • 7/30/13 to 8/3/13 - The patient was in [facility].  The patient was admitted for surgery for her recurrent ovarian cancer.  The patient had exploratory laparotomy, omentectomy, bowel resection, liver biopsy and appendectomy.  Pathology revealed of the omentum metastatic serous carcinoma of the transverse colon, segmental resection showed metastatic serous carcinoma involving mesenteric fat.  The liver wedge resection showed fibrosclerotic thickening of the hepatic capsule.  Benign liver parenchyma.  No evidence of metastatic tumor.  Appendix showed fibrous obliteration of the lumen.  Negative for metastatic carcinoma.  Rectum and sigmoid colon segmental resection showed metastatic serous carcinoma invading the colorectal mucosa uninvolved by tumor.  Vaginal mass showed metastatic serous carcinoma.  Margins are positive for tumor.  • 9/24/13 - Chemotherapy orders were written for patient to start carboplatin 550 mg IV day one and Taxol 330 mg IV day one  to be cycled every three weeks.  • 10/10/13 - The patient started cycle #1 of chemotherapy consisting of Carboplatin and Taxol.  • 09/25/13 -  is 10.6 normal.  • 10/01/13 - CT of the chest, abdomen and pelvis revealed new reticular nodular occupancy in the anterior segment of the right upper lobe, could reflect an inflammation or infectious etiology or could reflect unusual persistence of metastatic tumor.  New liver lesions, the smaller one appears to be implant on the surface of the liver, the larger may also represent an implant including the intrahepatic.  Several small mesenteric nodes seen throughout the abdomen and pelvis.  • 10/02/13 - Port placed by Dr. Sen.  • 10/24/13 - Orders written to start Neupogen daily and if more than three Neupogen are needed to give Neulasta after next chemo.  ANC is 0.15.  • 10/31/14 - Patient given cycle 2 of chemotherapy with Taxol and Carboplatin.  • 11/21/13 - The patient started cycle 3 of chemotherapy consisting of Carboplatin and Taxol.  • 11/26/13 - CT scan of chest, abdomen and pelvis revealed no evidence of active disease in the chest.  Reticulonodular opacity has resolved.  Metastatic lesion impressing upon the hepatic dome similar to prior exam.  No evidence of a change.  No evidence of new disease.  Postsurgical changes in the pelvis and throughout the retroperitoneum in the large bowel.  Finding containing anterior abdominal wall hernia containing fat tissue and enhancing vessels, 3 cm in diameter.  • 12/2/13 - Orders written to start Neupogen per protocol as well as Aranesp due to low hemoglobin and ANC.  ANC is 0.14.  Hemoglobin is 9.7.  • 12/11/13 - Orders written to hold chemotherapy until next week and decrease dose by 20% due to low platelet count.  Platelet count is 85,000.  • 12/16/13 - The patient received cycle 4 of Carboplatin and Taxol at a 20% dose reduction so Taxol dose is 260 mg and Carboplatin is 440 mg.  • 12/16/13 - CA-125 12.6  (N).  • 12/19/13 - PET/CT scan.  Impression:  1. There is no evidence of hypermetabolism in the liver.  Specifically of the dominant lesion in or adjacent to the right hepatic dome that was seen and described on the recent CT study of 11/26/2013.  It is photopenic relative to the surrounding liver.  2.  There is no evidence of hypermetabolic soft issue mass lesion or free fluid in the abdomen or pelvis.  3.  The tonsillar tissues are slightly enlarged and have moderate activity level.  This maybe physiologic.  Correlation with oral cavity, examination is recommended.  4.  Mild amount of activity in the right level II jugular lymph chain without focally enlarged lymph nodes is of questionable significance.  • 1/6/13 - The patient received cycle 5 of chemotherapy with Taxol and Carboplatin.  • 1/27/14 - The patient started on cycle 6 of Taxol and Carboplatin.  • 2/18/14 - CT of the abdomen and pelvis with contrast; stable lesions impressing upon the hepatic dome, unchanged compared to the prior exam.  Multiple anterior abdominal wall hernias re-demonstrated.  Those above the umbilicus contain omental fat.  Below and to the left of the umbilicus as anterior abdominal hernia containing omental fat in nondilated small bowel which is larger than seen previously.  • 2/20/14 - The patient received cycle 7 of chemotherapy with Taxol and Carboplatin.   • 3/11/14 - Order written to discontinue chemotherapy due to patient has completed 7 cycles of chemotherapy and CT scans suggest possible remission.  • 3/11/14 -  11.0 (N).  • 06/05/14 - CT scan of the chest abdomen and pelvis with contrast: There are multiple anterior abdominal wall hernias.  There are 2 larger anterior abdominal wall hernias at the level of the transverse colon, which contain omental fat.  There are at least 2 small anterior abdominal wall hernias that contain omental fat.  There is a moderate sized anterior abdominal wall hernia at the level of the  umbilicus, eccentric to the left, which contain non-dilated bowel.  Interval decrease in the size of two deposit impressing on the liver.  The third tiny deposit has been stable.  • 8/19/14 -  10.4 (N).  • 12/19/14 -   10.0 (N)  • 1/7/15 - CT chest, abdomen and pelvis with stable appearance of the chest with several micronodules. Small hiatal hernia, slightly larger than previous exam, increased from 1.5 to 2.5 cm in diameter. Bochdalek hernia unchanged. Multiple hepatic lesions. The two dominant lesions at the right hepatic dome had decreased in size from previous. The remaining tiny nodules measured 3-5 mm in diameter and were unchanged. There was no evidence of new intra-abdominal or pelvic mass or free fluid. There were multiple anterior abdominal wall hernias which had increased in size.   • 7/27/15 - Comprehensive metabolic panel with no significant abnormalities. CA-125 of 21 (0-35).   • 10/26/15 - WBC 6, hemoglobin 13, platelet count 204,000. Heart rate 47. CA-125 of 20 (0-35).    • 2/18/16 - CT chest with contrast with stable micronodular density seen in the lungs without significant change from prior exam. CT abdomen and pelvis with regression of liver lesions with no new evidence of metastasis. Multiple ventral hernias again noted without significant change from prior exam. Creatinine 0.9 (0.6-1.3).    • 2/25/16 - Patient hospitalized at [facility] between 2/25/16 and 2/27/16 with pyelonephritis secondary to E-coli. She was given two days of IV Rocephin and then placed on oral Levaquin. She was asked to hold her Coumadin, but then had nausea and vomiting because of Levaquin and stopped the Levaquin also. Her CA-125 was 32 (0-35) and CEA 1.3 (0-5). Her urine grew >100,000 E-coli. CT of the abdomen and pelvis was done which revealed 4.1 x 1.8 cm sized mild ovoid area of decreased density in the liver parenchyma along the anteromedial aspect of the dome of the right lobe of the liver,  unchanged significantly since the previous study of 2/18/16. There was suspicion of a very small pericardial effusion. There was suspicion of a small hiatal hernia. There were several hernias in the anterior abdominal wall. A 3.5 x 3.1 cm incised well-circumscribed abnormal density in the left retroperitoneal fatty soft tissue at the level of the left iliac crest was suggestive of tumor recurrence. There was an abnormal density in the left retroperitoneal soft tissues in the left flank that partially surrounded the left kidney and extended downward to the left pelvic area. Diverticulosis of the distal descending colon and sigmoid colon were seen.     • 3/8/16 - Patient prescribed Bactrim DS 1 p.o. b.i.d. for 10 days for pyelonephritis, which was partially treated with Rocephin and Levaquin. Urine with 40,000 E-coli treated with Macrobid for 7 days.  of 20 (0-35).    • 3/31/16 - PET scan with area of low density anteriorly in the right lobe of the liver with no significant radiopharmaceutical uptake to suggest malignancy. Multiple round soft tissue density masses showing increased radiopharmaceutical activity and multiple anterior abdominal wall hernias.   • 5/10/16 - Patient complains of venous enlargement of the left chest wall around the port. Has not been seen by  yet, but has an appointment on 5/23/16. Complained of a cough.   • 5/12/16 - Infusaport contrast study with no evidence of fibrin sheath. Chest x-ray with mild cardiac prominence.   • 5/23/16 - Patient seen in consultation by Sheng Bowman M.D. at [facility] and a chemotherapy trial recommended.   • 6/1/16 -   of 27.1 (0-35).    • 6/14/16 - WBC 5.71, hemoglobin 12.4, platelet count 188,000. Patient is scheduled for surgery at  on 6/16/16 after further discussion with  physicians. Discussed adjuvant chemotherapy.   • 6/16/16 - Excisional biopsy of abdominal wall mass performed by Sheng Bowman M.D. at [facility]  with pathology revealing metastatic high-grade papillary serous carcinoma.   • 7/7/16 - Received a call from the patient that Tramadol was not working and that she was given Norco #24 after her biopsy at  which did help her pain. Patient prescribed Norco 5/325 one p.o. q. 4-6 hours p.r.n. #60 with no refills. Echocardiogram with left ventricular inferior wall hypokinesis with ejection fraction of 50-55%.   • 7/8/16 - Patient seen in consultation by Sheng Bowman M.D. in consultation at  for metastatic high-grade papillary serous carcinoma diagnosed by excisional biopsy on 6/16/16 with carcinomatosis and patient not a surgical resection candidate. Genetic sequencing and susceptibility testing of tumor was ordered. Biopsy was done of anterior abdominal wall.   • 7/7/16 - Echocardiogram with left atrial enlargement. Mild mitral regurgitation. Left ventricular proximal inferior wall hypokinesis with ejection fraction of 50-55%.   • 7/11/16 - While waiting for genomics results, in order not to delay treatment further, order written for Taxotere 60 mg/M2 IV over one hour followed by Carboplatin AUC 5 over one hour, cycles to be repeated every three weeks.  Comprehensive metabolic panel normal.  26 (0-35).    • 725/16 - Pain contract signed for use of Norco.   • 8/5/16 - Patient stated that she experienced a red rash and was itchy post taking Norco. Norco discontinued and Tramadol refilled.   • 8/16/16 - Patient started cycle 1 chemotherapy. WBC 7.1, hemoglobin 11.2, MCV 88.7, platelet count 94,000. Patient complained of nausea for a week post chemo. Already getting Emend, Aloxi and Decadron. Added Omeprazole 40 mg daily orally.   • 8/17/16 - Urine culture with >100,000 E-coli.   • 8/25/16 - Cycle 2 chemotherapy given.   • 9/9/16 - Magnesium 1.3, replaced intravenously. Potassium 3.4 (3.6-5.1), increased oral potassium supplementation.    • 9/16/16 - Cycle 3 chemotherapy given.   • 9/19/16 - Comprehensive  metabolic panel with no significant abnormality.   • 9/20/16 - CT abdomen and pelvis with evidence of progressive metastatic disease in the abdomen and pelvis with interval enlargement of several soft tissue attenuations and subcutaneous nodules in the anterior abdominal wall. Interval enlargement of a left pelvic soft tissue attenuation mass. A lentiform area of low attenuation in the dome of the liver stable in size. Multiple anterior abdominal wall hernias containing fat and/or bowel. Marked pelvic floor laxity. CT chest negative for evidence of metastatic disease. Tiny pulmonary nodules in the right lung stable since 2013 consistent with a benign etiology.   • 9/23/16 - Macrobid 100 mg p.o. b.i.d. x7 days prescribed for UTI. Current chemotherapy discontinued after discussion and new chemotherapy orders written. Carboplatin AUC-5 IV day 1 and Doxil (Liposomal Doxorubicin) 30 mg/M2 IV day 1 to cycle q. 21 days.  of 18 (0-35). Magnesium 1.6, replaced intravenously. Comprehensive metabolic panel with potassium 3.4 (3.6-5.1).     • 10/13/16 - Patient started on cycle 1 of Carboplatin and Liposomal Doxorubicin followed by Neulasta.   • 11/3/16 - Cycle 2 Carbo and Doxil given.   • 11/17/16 - Magnesium 1.0, replaced intravenously. Oral potassium supplement increased.   • 11/29/16 - CT chest with small non-calcified right pulmonary nodules unchanged. Benign bone island in the midthoracic vertebral body along with benign bony hemangiomas in the middle thoracic vertebral bodies. CT abdomen and pelvis with mild progressive metastatic disease from CT of September 2016. Anterior abdominal wall mass superiorly mildly increased in size with new area noted as described measuring 3 x 1.7 cm. 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 Stable area of decreased uptake in the dome of the liver not hypermetabolic on recent PET scan, likely representing cyst or focal fatty  infiltration. Stable small hiatal hernia, fat-containing Bochdalek’s hernia and anterior abdominal wall hernias with stable pelvic floor relaxation.    • 12/1/16 - Cycle 3 chemotherapy given.   • 12/8/16 - Current chemotherapy discontinued and patient started on Gemcitabine 1000 mg/M2 IV days 1, 8, 15 q. 28 days.   • 12/22/16 - Patient seen in followup by Juliana Roy M.D. at the pain clinic, treated with Oxycodone and Lyrica. Transaminases normal. Patient started cycle 1 chemotherapy.   • 12/29/16 - Magnesium 1.1 (1.8-2.5), replaced intravenously.   • 1/5/17 - Cycle 1 day 15 chemotherapy held as patient leaving for vacation to [place]. Chemotherapy dose decreased by 20%. Patient complains of diarrhea for which Imodium prescribed.   • 1/11/17 - BRCA-1 and BRCA-2 negative.   • 1/12/17 - Echocardiogram with ejection fraction 60% or greater.   • 1/19/17 - Comprehensive metabolic panel with no significant abnormality. Patient started cycle 2 chemotherapy.   • 2/2/17 - Urine with >100,000 E-coli treated with Cipro 500 mg p.o. b.i.d. x1 week.   • 2/6/17 - Magnesium 1.1 (1.8-2.5), replaced intravenously.    • 2/23/17 - Patient started cycle 3 chemotherapy.   • 2/27/17 - CT chest with interval development of too numerous to count very small pulmonary nodules, ill-defined ground glass opacities concerning for development of pulmonary metastatic disease. Stable left-sided chest port. CT abdomen and pelvis with stable appearance of multiple small soft tissue densities within the abdomen near midline subcutaneous fat of the abdominal wall. Interval decrease in size of largely calcified left pelvic mass and multiple fat in bowel containing small ventral hernias.   • 3/6/17 - Pulmonary consultation obtained for lung nodules. Discussed CT results with patient. Decision made to continue present chemotherapy until further lung evaluation as abdominal disease better.  of 18 (0-35).    • 3/16/17 - Patient started cycle  4 chemotherapy, but treatment held from day 8 onwards because of hospitalization.   • 3/19/17 - Patient was hospitalized at [facility] between 3/19/17 and 3/25/17 with chest pain. Chest x-ray had revealed increased mild bibasilar airspace disease. Troponin I and EKG’s were negative. INR was elevated. Patient was treated with antibiotics. EGD was performed revealing small hiatal hernia and esophageal dilatation was performed. Bronchoscopy was performed also with no intrabronchial lesions identified. IgA was 51 (), IgG 320 (600-1500) and IgM 19 (). Lexiscan nuclear stress test had no evidence of reversible myocardial ischemia with normal left ventricular ejection fraction of 58%. CT chest had development of patchy bilateral ground glass opacities most pronounced involving the left upper lobe and bilateral lower lobes. Multiple tiny bilateral pulmonary nodules were less conspicuous. The patient underwent a bronchoscopy by Jerica Esparza M.D. with right upper lobe bronchoalveolar lavage revealing benign squamous cells and bronchial cells with pulmonary macrophages present. There was mild acute inflammation. Negative for malignant cells. Prilosec was changed to Famotidine for hypomagnesemia.    • 4/6/17 - Magnesium 1.2 (1.8-2.5). Comprehensive metabolic panel with no significant abnormality. Patient seen in follow-up by Fito Ram M.D. at [facility]. Treated with Lyrica and Oxycodone.    • 5/4/17 - Patient complains of a rash itching on her right upper arm. Eczematous rash noted and patient prescribed Cyclocort 0.1% b.i.d. Magnesium infusions continued with each chemo. Order written to resume chemotherapy.   • 5/16/17 - Cycle 5 chemotherapy started.   • 5/26/17 - Magnesium 1.4 (1.8-2.5), replaced intravenously. Potassium 2.9 (3.6-5.1), KCL increased to 20 mEq three in the morning and three in the evenings.   • 5/30/17 - PET scan with remaining metabolic activity within the nodular areas. The suggested  mass which is partially calcified and necrotic within the left aspect of the pelvis seems slightly larger with increased metabolic activity. There was more calcification in these areas compared to prior study, suggesting inflammation.      • 6/8/17 - Patient complaining of shortness of breath. Does take HCTZ at home. Chest x-ray ordered and chemotherapy continued along with weekly magnesium replacement. Chest x-ray with no acute cardiopulmonary abnormalities.   • 6/13/17 - Patient received cycle 6 of chemotherapy.   • 7/31/17 - WBC 5.0, hemoglobin 11.2, MCV 89.7, platelet count 217,000. Patient is to resume chemotherapy starting with cycle 7 on Wednesday of this week.  of 19 (<35). Potassium 3.3 (3.5-5.3).     • 8/2/17 - Patient began cycle 7 of chemotherapy.   • 8/30/17 - Patient started cycle 8 chemotherapy.   • 9/5/17 - Patient seen in followup at Pain Management by Fito Ram M.D. and continued on treatment with Oxycodone and Lyrica.   • 9/13/17 - Patient was hospitalized at [facility] for a day with dizziness secondary to dehydration, hypokalemia and anemia. She was given 1 unit of packed red blood cells and electrolytes were replaced. Chest x-ray revealed a subtle opacity in the left lateral lung base favored to represent atelectasis. Magnesium 1.3 (1.5-2.5), patient continued on replacement.    • 9/22/17 - Chemotherapy and magnesium replacement continued with decrease in chemotherapy dose by 10% secondary to cytopenias.   • 9/25/17 - Cycle 9 chemotherapy started.   • 10/12/17 - CT of the chest with contrast showed several tiny pulmonary nodules. One within the right lower lobe appears new from prior exams. Two adjacent foci of nodularity within the medial right lower lobe suggestive of minor infectious or inflammatory process. CT of the abdomen and pelvis with contrast which showed soft tissue nodules along the anterior abdominal and pelvic walls unchanged from previous scan. Also a partially  calcified mass within the left pelvis unchanged. No acute process identified within the abdomen or pelvis. Several left paracentral ventral hernias unchanged. No evidence of acute bowel obstruction.   • 10/16/17 - Order written for Levaquin 500 mg p.o. daily as the patient states that she has had a productive cough, fever and chills and with the results of the CT scan showing a possible infectious versus inflammatory process. Order also written to continue chemotherapy and magnesium replacement as previously prescribed.   • 10/23/17 - Cycle 10 chemotherapy started.   • 11/19/17 - Patient went to the Emergency Room at [facility] complaining of right lower extremity redness and fever for a day. Venous Doppler had no evidence of a DVT and patient was discharged home on Clindamycin.   • 11/21/17 -  of 17 (0-35).   • 12/4/17 - Cycle 11 chemotherapy started.   • 12/20/17 - PET scan with multiple hypermetabolic soft tissue nodules abutting the anterior abdominal wall, stable from previous examination. Peripherally calcified left lower quadrant mass near the ascending colon stable in size demonstrating no hypermetabolic uptake. Previously had maximum SUV of 7. Right medial basilar pulmonary nodules resolved.   • 12/22/17 - CT left lower extremity with soft tissue swelling with skin thickening present along the anterior and medial aspect of the leg, slightly more pronounced around the palpable marker seen within the upper medial aspect of the lower extremity.   • 12/26/17 - Elastic stockings prescribed for lower extremity edema.   • 1/8/18 - Patient hospitalized at [facility] between 1/8/18 and 1/11/18 with fever of up to 101 degrees and painful rash on the lower extremities of several weeks’ duration. She was found to be hypokalemic and MRI of the lower extremities revealed thickening and swelling. Chest x-ray had no acute cardiopulmonary abnormality. Skin biopsy was performed by Surgery revealing stasis dermatitis and no evidence  of malignancy. Infectious Disease consultation was obtained and IV antibiotics were stopped. Blood cultures had no growth.   • 1/22/18 - Patient asked to start wearing elastic stockings which she has not started yet. She was given a prescription for Dyazide 1 p.o. daily.   • 2/26/18 - Ordered chemo to resume again. Patient unaware that she was supposed to resume chemo after her last visit in January. Continue magnesium infusions on day 1, 8 and 15. Prescribed Levaquin 500 mg p.o. daily x10 days for productive cough x1 week. History of viral illness consistent with influenza.  of 18 (0-35). Chest x-ray with no acute process.    • 3/5/18 - Cycle 12 chemotherapy started.   • 3/26/18 - Options discussed with patient. Decision made to discontinue IV Gemzar and orders written to start on Niraparib (Zejula) 300 mg p.o. daily as maintenance therapy.   • 4/11/18 - Niraparib discontinued due to insurance denial. Orders written to start Carboplatin-AUC 5 IV day 1 cycling every 21 days. Orders written for Neulasta 6 mg subcutaneous kit day 1 with palliative treatment intent and expected duration of treatment three months.   • 4/12/18 - CT chest, abdomen and pelvis with contrast revealed numerous tiny centrilobular nodules in the lungs favored to reflect infectious or inflammatory process. Nodules ranged 1-3 mm in size and are new from prior studies with metastatic disease not entirely excluded. Stable small hiatal hernia. Interval progression of metastatic disease involving the subcutaneous tissues at the ventral abdominal wall. Multiple soft tissue density nodules previously shown to be hypermetabolic on PET scan. New small crescent of low attenuation material along the periphery of the spleen with similar finding at the dome of the liver. Findings are concerning for peritoneal metastases. Density of the dome of the liver remained unchanged from multiple prior studies. Stable calcified mass in the left pelvic sidewall.  Urinary bladder wall thickening.   • 4/23/18 - Cycle 1 chemotherapy with Carboplatin administered along with Neulasta injection.   • 4/26/18 - Neulasta discontinued due to insurance denial.   • 5/14/18 - Patient received cycle 2 Carboplatin.   • 6/5/18 - Cycle 3 day 1 chemotherapy with Carboplatin administered.   • 6/20/18 - CT chest, abdomen and pelvis at [facility] showed re-demonstration of soft tissue mass in the ventral wall hernia left of the midline as well as the dome of the liver, likely representing metastatic disease similar as compared to the prior study. Additional calcified lesions present in the upper left hemipelvis and along the anterior abdominal wall to the right of the midline also likely representing metastatic disease. No definite new lesions were identified. Moderate to large stool burden likely related to mild constipation was present. No suspicious lung nodules were identified. The previously-described ground glass nodules appeared to have resolved. There was a stable subpleural nodule in the right upper lobe measuring 3 mm present since 2014 without significant changes.   • 6/26/18 - Cycle 4 day 1 Carboplatin administered with addition of Neulasta for febrile neutropenia prophylaxis.   • 6/29/18 - Reviewed CT scans with patient. Orders written to discontinue Carboplatin and Neulasta and plan to start Niraparib 300 mg by mouth daily as maintenance therapy. Prescribed Amlodipine 2.5 mg p.o. daily for chemo-induced hypertension.   • 7/18/18 - Orders written to increase weekly Magnesium Sulfate to 3 g IV weekly due to continued hypomagnesemia.   • 8/1/18 - Patient reports she has not received Niraparib (Zejula) to date.   • 8/30/18 - Patient’s phone was not working so though Niraparib approved, the drug company had not been able to get ahold of her. Patient supplied with drug company number so that she can start taking the pills.   • 9/6/18 -  of 20 (N).   • 9/18/18 - Urinalysis  done for dysuria and back pain. Urine culture grew 20,000 to 50,000 colonies of urogenital ruy. Prescribed Bactrim DS one tablet twice daily for one week.   • September 2018 - Patient initiated on Zejula 300 mg p.o. daily.   • 10/1/18 - WBC 3.5, hemoglobin 12, platelet count 74,000. Niraparib (Zejula) dose held and then resumed when platelets above 100,000 at a dose of 200 mg daily.   • 10/15/18 - Patient received Niraparib (Zejula) at 200 mg p.o. daily with a platelet count of 198,000.   • 11/7/18 - WBC 6, hemoglobin 11.6, MCV 96.2, platelet count 137,000. Patient claims to have stopped taking Niraparib a few days prior because of cough. Asked to resume taking it. Ciprofloxacin 500 mg p.o. twice daily for one week for bronchitis.   • 12/3/18 - Magnesium Sulfate infusions changed to every two weeks, to send mag level before and give 3 grams. DC’d Magnesium Oxide and started Magnesium Plus Protein two pills twice daily.   • 1/4/19 - CT chest, abdomen and pelvis with new patchy nodular areas of airspace consolidation within the bilateral upper lobes, left greater than right, favored to be infectious or inflammatory. Interval enlargement of the peritoneal soft tissue masses within the pelvis compatible with progression of disease. Enlarging ventral hernia now containing multiple loops of small bowel and colon.   • 1/7/19 - Patient has not been coming in for weekly magnesium replacement as nursing has not been able to get ahold of her. Results of CT’s discussed with patient. Decision made to change her to IV chemotherapy with Carboplatin AUC-5 day 1 and pegylated liposomal Doxorubicin (Doxil) 30 mg/M2 IV day 1 to cycle every four weeks. Patient made aware of the limited choices of her treatment available.   • 1/31/19 - Echocardiogram showed LVEF 50-55% and otherwise normal echo Doppler study.   • 2/4/19 - New chemotherapy not initiated to date with difficulty contacting patient for echocardiogram. Neulasta On-Pro 6 mg  ordered with chemotherapy due to extensive prior exposure to chemotherapy, increasing risk of febrile neutropenia, history of neutropenic events on prior chemotherapy regimens.   • 2/15/19 - Patient started cycle 1 chemotherapy with Neulasta support.   • 3/6/19 -  of 28 (0-35).   • 3/15/19 - Cycle 2 Carboplatin with Liposomal Doxorubicin (Doxil) and Neulasta support initiated.     • 4/10/19 - Patient was requested to followup with Dr. Queen regarding hypotension and blood pressure medication dosing. Patient appears to be tolerating treatment well with cytopenias not requiring dose adjustments. No Doxil-related skin or mucus membrane toxicities or other significant toxicities to date.   • 4/12/19 - Cycle 3 chemotherapy given.   • 4/16/19 - CT chest, abdomen and pelvis with no evidence of active metastasis to the chest. Several tree-in-bud nodules within the periphery of the inferior right upper lobe likely infectious or inflammatory. Disease burden within abdomen and pelvis stable to slightly increased from prior CT. Specifically, a soft tissue mass along the right rectus muscle slightly increased in bulk. Multiple ventral hernias, some containing loops of bowel, with no evidence of acute bowel obstruction.   • 5/8/19 - Discussed CT results with patient. Overall stable disease and would like to continue same treatment. Dove soap and Hydrocortisone Cream p.r.n. prescribed for scattered rash on back.   • 5/10/19 - Cycle 4 Carboplatin and Liposomal Doxorubicin initiated.   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 It was ER positive, HER2/zuleima negative, PD-L1 negative. There was TOPO1 positivity and TUBB3 positivity. TMB was low (two mutations per megabyte MUTS/MB) and MSI was stable. There were 13 therapies considered with increased benefit. The 13 therapies with increased benefit included Fulvestrant, Irinotecan, Letrozole, Topotecan,  Anastrazole, Exemestane, Tamoxifen, all of which were referenced to [facility]N as well as Abemaciclib, Everolimus, Gefitinib, Palbociclib, Ribociclib and Toremifene.     • 6/4/19 - Past consented to [facility] registry by research coordinator, Genoveva Vu R.N. Patient tolerating Carboplatin and Liposomal Doxorubicin well with some expected cytopenias and some mild and tolerable Neulasta induced bone pain.  • 6/5/19 echocardiogram with preserved LV systolic function with EF around 60%.  • 6/7/19 cycle 5 chemotherapy with Neulasta support given.  Patient continued on mag oxide 400 mg p.o. twice daily and weekly IV 3 g mag sulfate infusions for persistent hypomagnesemia.  2. Deep vein thrombosis of left upper extremity diagnosis established in October of 2013.  • 10/16/13 - Venous Doppler of left upper extremity.  Impression:  Deep vein thrombosis involving the subclavian, axillary and brachial veins on the left side, superficial vein thrombosis involving the left basilic vein.  • 10/16/13 - Order written for Lovenox 120 mg subcutaneously daily until INR is in therapeutic range.  Order written for Coumadin 5 mg p.o. daily.  The patient is to follow PT and INR protocol.  • 5/20/16 - Venous Doppler bilateral lower extremities normal.  • 4/30/18 - Patient continued on Coumadin following the INR protocol.   3. Anemia diagnosis established in November 2013 and pernicious anemia diagnosis established March 2016.   • 11/15/13 - Hemoglobin is 7.8.  • 12/2/13 - Orders written to start Aranesp 200 mg subcutaneous every two weeks due to low hemoglobin secondary to chemo induced anemia.  Hemoglobin is 9.7.  Anemia workup is ordered.  • 12/03/13 - Ferritin 179.8 (N), folic acid 8.3 (N), vitamin B12 314, iron 104 (N), TIBC 298 (N), iron saturation 35 (N), Reticulocyte count 0.88 (N).  EPO level 124 (N).  Haptoglobin 22 (H).  • 10/22/14 - Hemoglobin 12.5, hematocrit 37.3, MCV 91.6.  • 10/23/14 - Anemia resolved.   • 3/8/16 - WBC 5.8,  hemoglobin 11.7, MCV 90.3, platelet count 245,000. Vitamin B12 of 189 (180-914), ferritin 61 (), iron 91 (), TIBC 345 (228-428).   • 3/15/16 -  ().        • 3/22/16 - Intrinsic factor antibody positive, antiparietal antibody negative. Vitamin B12 at 1000 mcg IM weekly started, but the patient after receiving first dose on 3/22/16 did not come back for injections until 4/13/16.   • 4/13/16 - WBC 5.1, hemoglobin 12.5, MCV 87.9, platelet count 201,000. Patient given B12 injection and then continued at home.   • 5/10/16 - WBC 5.1, hemoglobin 12.5, platelet count 201,000.   • 6/14/16 - Monthly Vitamin B12 injections continued at home.   • 7/11/16 - WBC 5.48, hemoglobin 12.7, MCV 86.2, platelet count 200,000.   • 8/16/16 - Patient continued on monthly Vitamin B12 injections at home.   • 1/5/17 - WBC 2.6 with 38% neutrophils, 56% lymphocytes, 5% monocytes, hemoglobin 10.5, .3, platelet count 63,000. Patient continued on Vitamin B12 injections 1000 mcg IM monthly at home.   • 3/20/17 - Retic 0.41 (0.5-1.5), creatinine 1.0 (0.4-1.0). Iron 12 (), TIBC 270 (228-428), ferritin 259 (), haptoglobin 340 (), TSH 0.43 (0.34-5.6), folate 6.0 (5.9-24.8). Serum protein electrophoresis revealed decreased gammaglobulins. Serum EDU had no monoclonal gammopathy identified. Stool Hemoccult was negative.   • 6/8/17 - WBC 6.3, hemoglobin 8.3, MCV 92.4, platelet count 50,000. Procrit 40,000 units subq weekly added for chemotherapy-induced anemia. Patient continued on Vitamin B12 at 1000 mcg IM monthly at home.   • 7/5/17 - Iron 33 (), TIBC 328 (228-428), ferritin 211 (), TSH 2.46 (0.34-5.6).       • 7/31/17 - WBC 5.0, hemoglobin 11.2, MCV 89.7, platelet count 217,000. We will continue with the Procrit 40,000 units subq weekly for chemo-induced anemia when the hemoglobin falls below 10.0 and the patient is also to continue with the Vitamin B12 at 1000 mcg IM monthly at home.  Creatinine 1.24 (0.5-0.99).    • 8/28/17 - WBC 9.6, hemoglobin 8.7, MCV 93.7, platelet count 133,000. Patient is to continue with the Procrit 40,000 units subq weekly and will receive that today. She is also to continue with the Vitamin B12 at 1000 mcg IM monthly at home.  • 10/16/17 - WBC 7.5, hemoglobin 9.6, MCV 98.4, platelet count 195,000. Patient is to continue with Procrit 40,000 units subq weekly and will receive Procrit today.   • 1/1/18 - Creatinine 1.3 (0.4-1.0).    • 2/19/18 - Procrit given for hemoglobin 9.9.   • 2/26/18 - Continue Procrit 40,000 units weekly p.r.n. hemoglobin <10. Continue Vitamin B12 at 1000 mcg IM monthly at home.   • 3/26/18 - Hemoglobin 8.3. Procrit dose increased to 60,000 units weekly. Iron 29 (), TIBC 306 (228-428), and iron sat 9% (15-50). Iron 29 (), TIBC 306 (228-428), iron saturation 9% (15-50).   • 3/27/18 - Order written to start Ferrous sulfate 325 mg p.o. b.i.d.    • 4/2/18 - Stool heme negative x3.   • 4/30/18 - Patient had not started Ferrous sulfate 325 mg p.o. b.i.d. and verbalized understanding to do so and to notify the office if side effects are not tolerable.   • 5/24/18 - Patient was hospitalized at [facility] between 5/24/18 and 5/26/18 with dark tarry stool. INR was subtherapeutic. She was given IV Protonix and Protonix 40 mg daily. She underwent an EGD by David Rogers M.D. revealing small hiatal hernia, small submucosal nodule near the cardia, erosive gastritis. Pathology on gastric antrum and body biopsy revealed iron pill gastritis and no evidence of Helicobacter pylori. She then underwent a colonoscopy revealing diverticulosis, hemorrhoids and surgical changes from previous resection. It was recommended to consider outpatient M2A if hemoglobin continued to drop.   • 6/4/18 - Order written to discontinue iron pills and to use Injectafer 750 mg IV days 1 and 8 for low iron sats. Order written for Procrit 40,000 units subq weekly. Iron 65  (), TIBC 328 (228-428), iron saturation 20% (15-50), ferritin 123 ().   • 6/29/18 - Discussed patient’s intolerance of oral iron due to gastritis. Oral iron discontinued with plans for Injectafer should iron level drop in the future.   • 11/7/18 - Patient claims not to be taking Vitamin B12 injections at home. Asked to resume those.   • 12/3/18 - Patient reports she has not been taking her B12 injections for a couple of months. She needs some syringes and needles for that.   • 2/4/19 - Patient is uncertain if she is currently taking Ferrous Sulfate or not. Patient with history of intolerance and will follow hemoglobin.   • 5/8/19 - Ferritin 64 ().      Past Medical History:   Diagnosis Date   • Hypertension    • Osteoporosis    • Ovarian cancer (CMS/HCC)        Past Surgical History:   Procedure Laterality Date   • COLON SURGERY     • COLONOSCOPY     • EXPLORATORY LAPAROTOMY     • HERNIA REPAIR     • HYSTERECTOMY     • OOPHORECTOMY           Current Outpatient Medications:   •  Acetaminophen 500 MG coapsule, Take 100 mg by mouth 4 (Four) Times a Day. As needed, Disp: , Rfl:   •  atorvastatin (LIPITOR) 20 MG tablet, Take 20 mg by mouth every night at bedtime., Disp: , Rfl: 3  •  Cyanocobalamin (B-12 COMPLIANCE INJECTION) 1000 MCG/ML kit, Inject 1,000 mcg into the appropriate muscle as directed by prescriber Every 30 (Thirty) Days., Disp: , Rfl:   •  famotidine (PEPCID) 20 MG tablet, Take 20 mg by mouth 2 (Two) Times a Day., Disp: , Rfl:   •  hydrochlorothiazide (HYDRODIURIL) 25 MG tablet, Take 25 mg by mouth Daily., Disp: , Rfl:   •  LYRICA 100 MG capsule, Take 100 mg by mouth 3 (Three) Times a Day., Disp: , Rfl: 1  •  magnesium oxide (MAG-OX) 400 MG tablet, Take 1 tablet by mouth 2 (Two) Times a Day., Disp: 60 tablet, Rfl: 3  •  ondansetron ODT (ZOFRAN-ODT) 8 MG disintegrating tablet, 8 mg As Needed., Disp: , Rfl:   •  potassium chloride (KLOR-CON) 20 MEQ CR tablet, Take 20 mEq by mouth Daily.,  "Disp: , Rfl:   •  pregabalin (LYRICA) 75 MG capsule, Take 75 mg by mouth 3 (Three) Times a Day., Disp: , Rfl:   •  sertraline (ZOLOFT) 50 MG tablet, TAKE 1 TABLET BY MOUTH EVERY EVENING BEFORE BED, Disp: , Rfl: 3  •  Syringe/Needle, Disp, (B-D 3CC LUER-JESSICA SYR 23GX1\") 23G X 1\" 3 ML misc, BD LUER-JESSICA SYRINGE 23G X 1\" 3 ML, Disp: , Rfl:   •  temazepam (RESTORIL) 15 MG capsule, TAKE ONE CAPSULE BY MOUTH AT BEDTIME AS NEEDED FOR TROUBLE SLEEPING, Disp: 30 capsule, Rfl: 1  •  valACYclovir (VALTREX) 500 MG tablet, Take 1,000 mg by mouth Daily., Disp: , Rfl:   •  warfarin (COUMADIN) 5 MG tablet, Take 5 mg by mouth Daily., Disp: , Rfl: 3  •  ATORVASTATIN CALCIUM PO, Take  by mouth., Disp: , Rfl:   •  famotidine (PEPCID) 40 MG tablet, Take 40 mg by mouth Every Morning Before Breakfast., Disp: , Rfl: 3  •  lisinopril (PRINIVIL,ZESTRIL) 20 MG tablet, Take 20 mg by mouth Daily., Disp: , Rfl:   •  magnesium oxide (MAGOX) 400 (241.3 Mg) MG tablet tablet, Take 400 mg by mouth Daily., Disp: , Rfl:   •  Magnesium Oxide 400 MG capsule, Take 800 mg by mouth 3 (Three) Times a Day., Disp: , Rfl:   •  oxyCODONE (OXY-IR) 5 MG capsule, Take 5 mg by mouth As Needed., Disp: , Rfl:   •  oxyCODONE (ROXICODONE) 10 MG tablet, TAKE 1/2 TO 1 TABLET BY MOUTH THREE TIMES A DAY AS NEEDED FOR PAIN, Disp: , Rfl: 0  •  oxyCODONE-acetaminophen (PERCOCET)  MG per tablet, Take 1 tablet by mouth Every 6 (Six) Hours As Needed for Moderate Pain ., Disp: , Rfl:   •  Potassium Chloride (KLOR-CON PO), Take 60 mg by mouth 2 (Two) Times a Day., Disp: , Rfl:   •  SERTRALINE HCL PO, Take  by mouth., Disp: , Rfl:   •  Warfarin Sodium (COUMADIN PO), Take  by mouth Take As Directed., Disp: , Rfl:     Allergies   Allergen Reactions   • Morphine Nausea Only and Hives   • Penicillin V Potassium Rash   • Sulfa Antibiotics Rash   • Levaquin [Levofloxacin] Nausea Only and Dizziness     When taken with Coumadin   • Amoxicillin Rash   • Norco [Hydrocodone-Acetaminophen] " "Rash       Family History   Problem Relation Age of Onset   • Hypertension Mother    • Cancer Father    • Hypertension Father    • Hypertension Sister    • Hypertension Brother    • Stroke Brother        Cancer-related family history includes Cancer in her father.    Social History     Tobacco Use   • Smoking status: Never Smoker   Substance Use Topics   • Alcohol use: No     Frequency: Never   • Drug use: No       I have reviewed the history of present illness, past medical history, family history, social history, lab results, all notes and other records since the patient was last seen on 5/8/2019.    SUBJECTIVE: Patient is here for follow up of her recurrent Stage IV ovarian cancer, left upper extremity DVT, pernicious anemia and iron deficiency anemia with p.o. intolerance. SIGIFREDO Atkinson present during office visit.           ROS:  Review of Systems   Constitutional: Negative for chills and fever.   HENT: Negative for ear pain, mouth sores, nosebleeds and sore throat.    Eyes: Negative for photophobia and visual disturbance.   Respiratory: Negative for wheezing and stridor.    Cardiovascular: Negative for chest pain and palpitations.   Gastrointestinal: Negative for abdominal pain, diarrhea, nausea and vomiting.   Endocrine: Negative for cold intolerance and heat intolerance.   Genitourinary: Negative for dysuria and hematuria.   Musculoskeletal: Negative for joint swelling and neck stiffness.   Skin: Negative for color change and rash.   Neurological: Negative for seizures and syncope.   Hematological: Negative for adenopathy.        No obvious bleeding   Psychiatric/Behavioral: Negative for agitation, confusion and hallucinations.       Objective:    Vitals:    07/02/19 0922   BP: 131/81   Pulse: 53   Resp: 18   Temp: 98.1 °F (36.7 °C)   Weight: 81.9 kg (180 lb 9.6 oz)   Height: 165.1 cm (65\")   PainSc: 0-No pain       ECOG  (1) Restricted in physically strenuous activity, ambulatory and able to do work " of light nature    Physical Exam   Constitutional: She is oriented to person, place, and time. No distress.   HENT:   Head: Normocephalic and atraumatic.   Dental filling and torus plenteous. Eye glasses present.       Eyes: Conjunctivae and EOM are normal. Right eye exhibits no discharge. Left eye exhibits no discharge. No scleral icterus.   Neck: Normal range of motion. Neck supple. No thyromegaly present.   Cardiovascular: Normal rate, regular rhythm and normal heart sounds. Exam reveals no gallop and no friction rub.   Pulmonary/Chest: Effort normal. No stridor. No respiratory distress. She has no wheezes.   Abdominal: Soft. Bowel sounds are normal. She exhibits no mass. There is no tenderness. There is no rebound and no guarding.   Musculoskeletal: Normal range of motion. She exhibits no tenderness.   Lymphadenopathy:     She has no cervical adenopathy.   Neurological: She is alert and oriented to person, place, and time. She exhibits normal muscle tone.   Skin: Skin is warm. No rash noted. She is not diaphoretic. No erythema.   Psychiatric: She has a normal mood and affect. Her behavior is normal.   Nursing note and vitals reviewed.      RECENT LABS  WBC   Date Value Ref Range Status   07/02/2019 3.19 (L) 3.40 - 10.80 10*3/mm3 Final     RBC   Date Value Ref Range Status   07/02/2019 3.20 (L) 3.77 - 5.28 10*6/mm3 Final     Hemoglobin   Date Value Ref Range Status   07/02/2019 10.6 (L) 12.0 - 15.9 g/dL Final     Hematocrit   Date Value Ref Range Status   07/02/2019 33.3 (L) 34.0 - 46.6 % Final     MCV   Date Value Ref Range Status   07/02/2019 104.1 (H) 79.0 - 97.0 fL Final     MCH   Date Value Ref Range Status   07/02/2019 33.1 (H) 26.6 - 33.0 pg Final     MCHC   Date Value Ref Range Status   07/02/2019 31.8 31.5 - 35.7 g/dL Final     RDW   Date Value Ref Range Status   07/02/2019 16.1 (H) 12.3 - 15.4 % Final     RDW-SD   Date Value Ref Range Status   07/02/2019 60.1 (H) 37.0 - 54.0 fl Final     MPV   Date  Value Ref Range Status   07/02/2019 10.0 6.0 - 12.0 fL Final     Platelets   Date Value Ref Range Status   07/02/2019 181 140 - 450 10*3/mm3 Final     Neutrophil %   Date Value Ref Range Status   07/02/2019 36.8 (L) 42.7 - 76.0 % Final     Lymphocyte %   Date Value Ref Range Status   07/02/2019 37.9 19.6 - 45.3 % Final     Monocyte %   Date Value Ref Range Status   07/02/2019 21.6 (H) 5.0 - 12.0 % Final     Eosinophil %   Date Value Ref Range Status   07/02/2019 3.1 0.3 - 6.2 % Final     Basophil %   Date Value Ref Range Status   07/02/2019 0.6 0.0 - 1.5 % Final     Neutrophils, Absolute   Date Value Ref Range Status   07/02/2019 1.17 (L) 1.70 - 7.00 10*3/mm3 Final     Lymphocytes, Absolute   Date Value Ref Range Status   07/02/2019 1.21 0.70 - 3.10 10*3/mm3 Final     Monocytes, Absolute   Date Value Ref Range Status   07/02/2019 0.69 0.10 - 0.90 10*3/mm3 Final     Eosinophils, Absolute   Date Value Ref Range Status   07/02/2019 0.10 0.00 - 0.40 10*3/mm3 Final     Basophils, Absolute   Date Value Ref Range Status   07/02/2019 0.02 0.00 - 0.20 10*3/mm3 Final     nRBC   Date Value Ref Range Status   12/17/2018 0 0 /100[WBCs] Final       Lab Results   Component Value Date    GLUCOSE 117 (H) 06/07/2019    BUN 14 06/07/2019    CREATININE 1.0 06/07/2019    EGFRIFAFRI >60 06/07/2019    BCR 14.0 06/07/2019    K 3.8 06/07/2019    CO2 24 06/07/2019    CALCIUM 9.0 06/07/2019    ALBUMIN 3.5 06/07/2019    LABIL2 1.5 06/07/2019    AST 27 06/07/2019    ALT 14 06/07/2019         Assessment/Plan     Malignant neoplasm of right ovary (CMS/HCC)  - CBC & Differential  - CBC Auto Differential  - CT chest w contrast  - CT abdomen pelvis w contrast  -     Chronic deep vein thrombosis (DVT) of left upper extremity, unspecified vein (CMS/HCC)  - Protime-INR    Hypomagnesemia    Pernicious anemia    Other iron deficiency anemia    Malabsorption due to intolerance, not elsewhere classified    Pancytopenia due to antineoplastic  chemotherapy (CMS/HCC)      ASSESSMENT:  The patient seems to be tolerating chemotherapy well.  Made aware of normal echo results.  Made her aware of plans to repeat CTs after next cycle of chemo and follow   Ca 125.  She is on 5 mg of Coumadin at present and we will follow INR protocol.  She is taking mag oxide 400 mg p.o. twice daily at home and getting weekly 3 g of IV infusions for persistent hypomagnesemia.  She is taking vitamin B12 1000 mcg IM monthly at home for the pernicious anemia.  Paradigm testing reveals a number of drugs that may be still useful in case of disease progression.    PLAN:   Will check INR today and follow protocol.   CT CAP in two weeks.   Continue vitamin b12 injections 1000mcg IM monthly at home.  We will continue current chemotherapy.  Continue IV and p.o. magnesium replacement.  Follow Ca 125.      I have reviewed labs results, imaging, vitals, and medications with the patient today. Will follow up in 1 months with NP.    Patient verbalized understanding and is in agreement of the above plan.    I have reviewed and validated the information above.   Daryn Tay M.D., F.A.C.P.

## 2019-07-02 ENCOUNTER — APPOINTMENT (OUTPATIENT)
Dept: LAB | Facility: HOSPITAL | Age: 64
End: 2019-07-02

## 2019-07-02 ENCOUNTER — OFFICE VISIT (OUTPATIENT)
Dept: ONCOLOGY | Facility: CLINIC | Age: 64
End: 2019-07-02

## 2019-07-02 VITALS
SYSTOLIC BLOOD PRESSURE: 131 MMHG | DIASTOLIC BLOOD PRESSURE: 81 MMHG | WEIGHT: 180.6 LBS | HEIGHT: 65 IN | BODY MASS INDEX: 30.09 KG/M2 | TEMPERATURE: 98.1 F | RESPIRATION RATE: 18 BRPM | HEART RATE: 53 BPM

## 2019-07-02 DIAGNOSIS — I82.722 CHRONIC DEEP VEIN THROMBOSIS (DVT) OF LEFT UPPER EXTREMITY, UNSPECIFIED VEIN (HCC): Primary | ICD-10-CM

## 2019-07-02 DIAGNOSIS — E83.42 HYPOMAGNESEMIA: ICD-10-CM

## 2019-07-02 DIAGNOSIS — D50.8 OTHER IRON DEFICIENCY ANEMIA: ICD-10-CM

## 2019-07-02 DIAGNOSIS — K90.49 MALABSORPTION DUE TO INTOLERANCE, NOT ELSEWHERE CLASSIFIED: ICD-10-CM

## 2019-07-02 DIAGNOSIS — D51.0 PERNICIOUS ANEMIA: ICD-10-CM

## 2019-07-02 DIAGNOSIS — C56.1 MALIGNANT NEOPLASM OF RIGHT OVARY (HCC): ICD-10-CM

## 2019-07-02 DIAGNOSIS — D61.810 PANCYTOPENIA DUE TO ANTINEOPLASTIC CHEMOTHERAPY (HCC): ICD-10-CM

## 2019-07-02 DIAGNOSIS — T45.1X5A PANCYTOPENIA DUE TO ANTINEOPLASTIC CHEMOTHERAPY (HCC): ICD-10-CM

## 2019-07-02 LAB
BASOPHILS # BLD AUTO: 0.02 10*3/MM3 (ref 0–0.2)
BASOPHILS NFR BLD AUTO: 0.6 % (ref 0–1.5)
DEPRECATED RDW RBC AUTO: 60.1 FL (ref 37–54)
EOSINOPHIL # BLD AUTO: 0.1 10*3/MM3 (ref 0–0.4)
EOSINOPHIL NFR BLD AUTO: 3.1 % (ref 0.3–6.2)
ERYTHROCYTE [DISTWIDTH] IN BLOOD BY AUTOMATED COUNT: 16.1 % (ref 12.3–15.4)
HCT VFR BLD AUTO: 33.3 % (ref 34–46.6)
HGB BLD-MCNC: 10.6 G/DL (ref 12–15.9)
LYMPHOCYTES # BLD AUTO: 1.21 10*3/MM3 (ref 0.7–3.1)
LYMPHOCYTES NFR BLD AUTO: 37.9 % (ref 19.6–45.3)
MCH RBC QN AUTO: 33.1 PG (ref 26.6–33)
MCHC RBC AUTO-ENTMCNC: 31.8 G/DL (ref 31.5–35.7)
MCV RBC AUTO: 104.1 FL (ref 79–97)
MONOCYTES # BLD AUTO: 0.69 10*3/MM3 (ref 0.1–0.9)
MONOCYTES NFR BLD AUTO: 21.6 % (ref 5–12)
NEUTROPHILS # BLD AUTO: 1.17 10*3/MM3 (ref 1.7–7)
NEUTROPHILS NFR BLD AUTO: 36.8 % (ref 42.7–76)
PLATELET # BLD AUTO: 181 10*3/MM3 (ref 140–450)
PMV BLD AUTO: 10 FL (ref 6–12)
RBC # BLD AUTO: 3.2 10*6/MM3 (ref 3.77–5.28)
WBC NRBC COR # BLD: 3.19 10*3/MM3 (ref 3.4–10.8)

## 2019-07-02 PROCEDURE — 85025 COMPLETE CBC W/AUTO DIFF WBC: CPT | Performed by: INTERNAL MEDICINE

## 2019-07-02 PROCEDURE — 99215 OFFICE O/P EST HI 40 MIN: CPT | Performed by: INTERNAL MEDICINE

## 2019-07-02 PROCEDURE — 36415 COLL VENOUS BLD VENIPUNCTURE: CPT | Performed by: INTERNAL MEDICINE

## 2019-07-02 RX ORDER — PALONOSETRON 0.05 MG/ML
0.25 INJECTION, SOLUTION INTRAVENOUS ONCE
Status: CANCELLED | OUTPATIENT
Start: 2019-07-05

## 2019-07-02 RX ORDER — DEXTROSE MONOHYDRATE 50 MG/ML
250 INJECTION, SOLUTION INTRAVENOUS ONCE
Status: CANCELLED | OUTPATIENT
Start: 2019-07-05

## 2019-07-02 RX ORDER — FAMOTIDINE 10 MG/ML
20 INJECTION, SOLUTION INTRAVENOUS AS NEEDED
Status: CANCELLED | OUTPATIENT
Start: 2019-07-05

## 2019-07-02 RX ORDER — DIPHENHYDRAMINE HYDROCHLORIDE 50 MG/ML
50 INJECTION INTRAMUSCULAR; INTRAVENOUS AS NEEDED
Status: CANCELLED | OUTPATIENT
Start: 2019-07-05

## 2019-07-03 ENCOUNTER — TELEPHONE (OUTPATIENT)
Dept: ONCOLOGY | Facility: CLINIC | Age: 64
End: 2019-07-03

## 2019-07-03 DIAGNOSIS — E83.42 HYPOMAGNESEMIA: ICD-10-CM

## 2019-07-03 DIAGNOSIS — R30.0 DYSURIA: Primary | ICD-10-CM

## 2019-07-03 RX ORDER — SODIUM CHLORIDE 9 MG/ML
250 INJECTION, SOLUTION INTRAVENOUS ONCE
Status: CANCELLED | OUTPATIENT
Start: 2019-07-05

## 2019-07-03 RX ORDER — MAGNESIUM SULFATE HEPTAHYDRATE 40 MG/ML
2 INJECTION, SOLUTION INTRAVENOUS ONCE
Status: CANCELLED | OUTPATIENT
Start: 2019-07-05

## 2019-07-03 NOTE — TELEPHONE ENCOUNTER
Patient states she saw Dr. Tay yesterday in f/u and forgot to tell him that she has UTI.  She states it burns and is painful with urination and has pain in back.  She is requesting antibiotic.  Patient has recurrent ovarian CA.  Currently receiving Carbo AUC 5 and Doxorubicin.  Patient allergic to Penicillin, Sulfa, Levaquin, Amoxicillin, Norco, and Morphine.  Patient scheduled to see NP in f/u on 7-30-19.

## 2019-07-03 NOTE — TELEPHONE ENCOUNTER
Patient needs to come in for UA and Cx before abx are given.   Please bring patient in for UA and urine cx.     Electronically signed by IRENA Dumas, 07/03/19, 4:01 PM.

## 2019-07-05 ENCOUNTER — HOSPITAL ENCOUNTER (OUTPATIENT)
Dept: ONCOLOGY | Facility: HOSPITAL | Age: 64
Setting detail: INFUSION SERIES
Discharge: HOME | End: 2019-07-05

## 2019-07-05 ENCOUNTER — HOSPITAL ENCOUNTER (OUTPATIENT)
Dept: ONCOLOGY | Facility: HOSPITAL | Age: 64
Discharge: HOME | End: 2019-07-05
Admitting: INTERNAL MEDICINE

## 2019-07-05 ENCOUNTER — LAB (OUTPATIENT)
Dept: LAB | Facility: HOSPITAL | Age: 64
End: 2019-07-05

## 2019-07-05 VITALS
WEIGHT: 180 LBS | TEMPERATURE: 98 F | HEART RATE: 56 BPM | SYSTOLIC BLOOD PRESSURE: 125 MMHG | BODY MASS INDEX: 29.99 KG/M2 | HEIGHT: 65 IN | DIASTOLIC BLOOD PRESSURE: 72 MMHG | RESPIRATION RATE: 18 BRPM

## 2019-07-05 DIAGNOSIS — I82.722 CHRONIC DEEP VEIN THROMBOSIS (DVT) OF LEFT UPPER EXTREMITY, UNSPECIFIED VEIN (HCC): ICD-10-CM

## 2019-07-05 DIAGNOSIS — C56.1 MALIGNANT NEOPLASM OF RIGHT OVARY (HCC): Primary | ICD-10-CM

## 2019-07-05 DIAGNOSIS — E83.42 HYPOMAGNESEMIA: ICD-10-CM

## 2019-07-05 DIAGNOSIS — I82.722 CHRONIC DEEP VEIN THROMBOSIS (DVT) OF LEFT UPPER EXTREMITY, UNSPECIFIED VEIN (HCC): Primary | ICD-10-CM

## 2019-07-05 DIAGNOSIS — R30.0 DYSURIA: ICD-10-CM

## 2019-07-05 LAB
ALBUMIN SERPL-MCNC: 3.6 G/DL (ref 3.5–4.8)
ALBUMIN/GLOB SERPL: 1.5 G/DL (ref 1–1.7)
ALP SERPL-CCNC: 97 U/L (ref 32–91)
ALT SERPL W P-5'-P-CCNC: 14 U/L (ref 14–54)
ANION GAP SERPL CALCULATED.3IONS-SCNC: 12.1 MMOL/L (ref 10–20)
AST SERPL-CCNC: 26 U/L (ref 15–41)
BACTERIA UR QL AUTO: ABNORMAL /HPF
BASOPHILS # BLD AUTO: 0.02 10*3/MM3 (ref 0–0.2)
BASOPHILS NFR BLD AUTO: 0.5 % (ref 0–1.5)
BILIRUB SERPL-MCNC: 0.5 MG/DL (ref 0.3–1.2)
BILIRUB UR QL STRIP: NEGATIVE
BUN BLD-MCNC: 16 MG/DL (ref 8–20)
BUN/CREAT SERPL: 17.8 (ref 5.4–26.2)
CALCIUM SPEC-SCNC: 9.1 MG/DL (ref 8.9–10.3)
CHLORIDE SERPL-SCNC: 107 MMOL/L (ref 101–111)
CLARITY UR: ABNORMAL
CO2 SERPL-SCNC: 24 MMOL/L (ref 22–32)
COLOR UR: ABNORMAL
CREAT BLD-MCNC: 0.9 MG/DL (ref 0.4–1)
CREAT BLDA-MCNC: 0.9 MG/DL (ref 0.6–1.3)
DEPRECATED RDW RBC AUTO: 58.8 FL (ref 37–54)
EOSINOPHIL # BLD AUTO: 0.08 10*3/MM3 (ref 0–0.4)
EOSINOPHIL NFR BLD AUTO: 2.1 % (ref 0.3–6.2)
ERYTHROCYTE [DISTWIDTH] IN BLOOD BY AUTOMATED COUNT: 15.8 % (ref 12.3–15.4)
GFR SERPL CREATININE-BSD FRML MDRD: 63 ML/MIN/1.73
GLOBULIN UR ELPH-MCNC: 2.4 GM/DL (ref 2.5–3.8)
GLUCOSE BLD-MCNC: 84 MG/DL (ref 65–99)
GLUCOSE UR STRIP-MCNC: NEGATIVE MG/DL
HCT VFR BLD AUTO: 32.2 % (ref 34–46.6)
HGB BLD-MCNC: 10.4 G/DL (ref 12–15.9)
HGB UR QL STRIP.AUTO: NEGATIVE
HYALINE CASTS UR QL AUTO: ABNORMAL /LPF
INR PPP: 2.1
KETONES UR QL STRIP: NEGATIVE
LEUKOCYTE ESTERASE UR QL STRIP.AUTO: ABNORMAL
LYMPHOCYTES # BLD AUTO: 1.19 10*3/MM3 (ref 0.7–3.1)
LYMPHOCYTES NFR BLD AUTO: 31.2 % (ref 19.6–45.3)
MAGNESIUM SERPL-MCNC: 1.4 MG/DL (ref 1.8–2.5)
MCH RBC QN AUTO: 33.5 PG (ref 26.6–33)
MCHC RBC AUTO-ENTMCNC: 32.3 G/DL (ref 31.5–35.7)
MCV RBC AUTO: 103.9 FL (ref 79–97)
MONOCYTES # BLD AUTO: 0.77 10*3/MM3 (ref 0.1–0.9)
MONOCYTES NFR BLD AUTO: 20.2 % (ref 5–12)
NEUTROPHILS # BLD AUTO: 1.75 10*3/MM3 (ref 1.7–7)
NEUTROPHILS NFR BLD AUTO: 46 % (ref 42.7–76)
NITRITE UR QL STRIP: NEGATIVE
PH UR STRIP.AUTO: 7.5 [PH] (ref 5–8)
PLATELET # BLD AUTO: 189 10*3/MM3 (ref 140–450)
PMV BLD AUTO: 10.3 FL (ref 6–12)
POTASSIUM BLD-SCNC: 4.1 MMOL/L (ref 3.6–5.1)
PROT SERPL-MCNC: 6 G/DL (ref 6.1–7.9)
PROT UR QL STRIP: NEGATIVE
PROTHROMBIN TIME: 25.2 SECONDS (ref 11–15)
RBC # BLD AUTO: 3.1 10*6/MM3 (ref 3.77–5.28)
RBC # UR: ABNORMAL /HPF
REF LAB TEST METHOD: ABNORMAL
SODIUM BLD-SCNC: 139 MMOL/L (ref 136–144)
SP GR UR STRIP: 1.01 (ref 1–1.03)
SQUAMOUS #/AREA URNS HPF: ABNORMAL /HPF
TRANS CELLS #/AREA URNS HPF: ABNORMAL /HPF
UROBILINOGEN UR QL STRIP: ABNORMAL
WBC NRBC COR # BLD: 3.81 10*3/MM3 (ref 3.4–10.8)
WBC UR QL AUTO: ABNORMAL /HPF

## 2019-07-05 PROCEDURE — 25010000002 DOXORUBICIN LIPOSOMAL PER 10 MG: Performed by: INTERNAL MEDICINE

## 2019-07-05 PROCEDURE — 25010000002 CARBOPLATIN PER 50 MG: Performed by: INTERNAL MEDICINE

## 2019-07-05 PROCEDURE — 80053 COMPREHEN METABOLIC PANEL: CPT | Performed by: INTERNAL MEDICINE

## 2019-07-05 PROCEDURE — 25010000002 MAGNESIUM SULFATE 2 GM/50ML SOLUTION: Performed by: NURSE PRACTITIONER

## 2019-07-05 PROCEDURE — 96377 APPLICATON ON-BODY INJECTOR: CPT | Performed by: INTERNAL MEDICINE

## 2019-07-05 PROCEDURE — 96367 TX/PROPH/DG ADDL SEQ IV INF: CPT | Performed by: INTERNAL MEDICINE

## 2019-07-05 PROCEDURE — 81001 URINALYSIS AUTO W/SCOPE: CPT | Performed by: NURSE PRACTITIONER

## 2019-07-05 PROCEDURE — 85025 COMPLETE CBC W/AUTO DIFF WBC: CPT | Performed by: INTERNAL MEDICINE

## 2019-07-05 PROCEDURE — 25010000002 FOSAPREPITANT PER 1 MG: Performed by: INTERNAL MEDICINE

## 2019-07-05 PROCEDURE — 25010000002 DEXAMETHASONE PER 1 MG: Performed by: INTERNAL MEDICINE

## 2019-07-05 PROCEDURE — 25010000002 PEGFILGRASTIM 6 MG/0.6ML PREFILLED SYRINGE KIT: Performed by: INTERNAL MEDICINE

## 2019-07-05 PROCEDURE — 82565 ASSAY OF CREATININE: CPT

## 2019-07-05 PROCEDURE — 85610 PROTHROMBIN TIME: CPT

## 2019-07-05 PROCEDURE — 83735 ASSAY OF MAGNESIUM: CPT | Performed by: NURSE PRACTITIONER

## 2019-07-05 PROCEDURE — 86304 IMMUNOASSAY TUMOR CA 125: CPT | Performed by: INTERNAL MEDICINE

## 2019-07-05 PROCEDURE — 25010000002 PALONOSETRON 0.25 MG/5ML SOLUTION PREFILLED SYRINGE: Performed by: INTERNAL MEDICINE

## 2019-07-05 PROCEDURE — 96413 CHEMO IV INFUSION 1 HR: CPT | Performed by: INTERNAL MEDICINE

## 2019-07-05 PROCEDURE — 96375 TX/PRO/DX INJ NEW DRUG ADDON: CPT | Performed by: INTERNAL MEDICINE

## 2019-07-05 PROCEDURE — 96417 CHEMO IV INFUS EACH ADDL SEQ: CPT | Performed by: INTERNAL MEDICINE

## 2019-07-05 RX ORDER — MAGNESIUM SULFATE HEPTAHYDRATE 40 MG/ML
2 INJECTION, SOLUTION INTRAVENOUS ONCE
Status: COMPLETED | OUTPATIENT
Start: 2019-07-05 | End: 2019-07-05

## 2019-07-05 RX ORDER — PALONOSETRON HYDROCHLORIDE 0.05 MG/ML
0.25 INJECTION, SOLUTION INTRAVENOUS ONCE
Status: COMPLETED | OUTPATIENT
Start: 2019-07-05 | End: 2019-07-05

## 2019-07-05 RX ORDER — DEXTROSE MONOHYDRATE 50 MG/ML
250 INJECTION, SOLUTION INTRAVENOUS ONCE
Status: COMPLETED | OUTPATIENT
Start: 2019-07-05 | End: 2019-07-05

## 2019-07-05 RX ORDER — SODIUM CHLORIDE 9 MG/ML
250 INJECTION, SOLUTION INTRAVENOUS ONCE
Status: COMPLETED | OUTPATIENT
Start: 2019-07-05 | End: 2019-07-05

## 2019-07-05 RX ADMIN — DEXTROSE MONOHYDRATE 250 ML: 5 INJECTION, SOLUTION INTRAVENOUS at 12:07

## 2019-07-05 RX ADMIN — CARBOPLATIN 540 MG: 10 INJECTION, SOLUTION INTRAVENOUS at 14:16

## 2019-07-05 RX ADMIN — MAGNESIUM SULFATE HEPTAHYDRATE 2 G: 40 INJECTION, SOLUTION INTRAVENOUS at 10:54

## 2019-07-05 RX ADMIN — SODIUM CHLORIDE 100 ML: 900 INJECTION, SOLUTION INTRAVENOUS at 12:09

## 2019-07-05 RX ADMIN — PALONOSETRON 0.25 MG: 0.25 INJECTION, SOLUTION INTRAVENOUS at 12:08

## 2019-07-05 RX ADMIN — PEGFILGRASTIM 6 MG: KIT SUBCUTANEOUS at 14:51

## 2019-07-05 RX ADMIN — DOXORUBICIN HYDROCHLORIDE 56 MG: 2 INJECTABLE, LIPOSOMAL INTRAVENOUS at 13:04

## 2019-07-05 RX ADMIN — DEXAMETHASONE SODIUM PHOSPHATE 8 MG: 4 INJECTION, SOLUTION INTRAMUSCULAR; INTRAVENOUS at 12:42

## 2019-07-05 RX ADMIN — SODIUM CHLORIDE 250 ML: 900 INJECTION, SOLUTION INTRAVENOUS at 10:52

## 2019-07-08 LAB — CANCER AG125 SERPL QL: 21 U/ML (ref 0–35)

## 2019-07-08 NOTE — TELEPHONE ENCOUNTER
Attempted to contact patient regarding 7-3-19 orders and patient's voicemail was not set up.  yas Penaa

## 2019-07-10 ENCOUNTER — TELEPHONE (OUTPATIENT)
Dept: PAIN MEDICINE | Facility: HOSPITAL | Age: 64
End: 2019-07-10

## 2019-07-10 ENCOUNTER — TELEPHONE (OUTPATIENT)
Dept: PAIN MEDICINE | Facility: CLINIC | Age: 64
End: 2019-07-10

## 2019-07-10 RX ORDER — TRIAMTERENE AND HYDROCHLOROTHIAZIDE 37.5; 25 MG/1; MG/1
CAPSULE ORAL
Qty: 90 CAPSULE | Refills: 1 | Status: SHIPPED | OUTPATIENT
Start: 2019-07-10 | End: 2019-12-08

## 2019-07-10 NOTE — TELEPHONE ENCOUNTER
Patient will be leaving for [place] tomorrow and just realized she will run out of medication before she returns. Her pharmacy said they will fill her prescription early with your approval.  Do you want to refill it early?

## 2019-07-16 ENCOUNTER — TELEPHONE (OUTPATIENT)
Dept: ONCOLOGY | Facility: CLINIC | Age: 64
End: 2019-07-16

## 2019-07-17 ENCOUNTER — TELEPHONE (OUTPATIENT)
Dept: ONCOLOGY | Facility: CLINIC | Age: 64
End: 2019-07-17

## 2019-07-17 RX ORDER — ONDANSETRON 8 MG/1
TABLET, ORALLY DISINTEGRATING ORAL
Qty: 20 TABLET | Refills: 3 | Status: SHIPPED | OUTPATIENT
Start: 2019-07-17 | End: 2019-12-08

## 2019-07-18 NOTE — TELEPHONE ENCOUNTER
Called the patient to schedule for magnesium infusion. No answer, no option for v/m. @[id number] RENETTA    Called the daughter's number listed, phone rang once and made a sound like someone picked up but heard nothing. Called back and was able to leave a message for the patient to call back. @ [id number] JL

## 2019-07-23 ENCOUNTER — HOSPITAL ENCOUNTER (OUTPATIENT)
Dept: CT IMAGING | Facility: HOSPITAL | Age: 64
Discharge: HOME | End: 2019-07-23
Admitting: INTERNAL MEDICINE

## 2019-07-23 ENCOUNTER — HOSPITAL ENCOUNTER (OUTPATIENT)
Dept: CT IMAGING | Facility: HOSPITAL | Age: 64
Discharge: HOME | End: 2019-07-23

## 2019-07-23 DIAGNOSIS — C56.1 MALIGNANT NEOPLASM OF RIGHT OVARY (HCC): ICD-10-CM

## 2019-07-23 PROCEDURE — 74177 CT ABD & PELVIS W/CONTRAST: CPT

## 2019-07-23 PROCEDURE — 0 IOPAMIDOL PER 1 ML: Performed by: INTERNAL MEDICINE

## 2019-07-23 PROCEDURE — 71260 CT THORAX DX C+: CPT

## 2019-07-23 RX ADMIN — IOPAMIDOL 100 ML: 755 INJECTION, SOLUTION INTRAVENOUS at 16:15

## 2019-07-23 NOTE — PROGRESS NOTES
I have asked for comparison of current CTs with the CTs performed at [facility] in April 2019.

## 2019-07-24 ENCOUNTER — TELEPHONE (OUTPATIENT)
Dept: ONCOLOGY | Facility: CLINIC | Age: 64
End: 2019-07-24

## 2019-07-24 DIAGNOSIS — C56.1 MALIGNANT NEOPLASM OF RIGHT OVARY (HCC): Primary | ICD-10-CM

## 2019-07-24 NOTE — TELEPHONE ENCOUNTER
Called [facility] and requested 7/23/19 CTs to be compared to 4/16/19 CTs from [facility] per [facility].

## 2019-07-25 NOTE — PROGRESS NOTES
Hematology/Oncology Outpatient Follow Up    PATIENT NAME:Tahira Zimmerman  :1955  MRN: [id number]  PRIMARY CARE PHYSICIAN: Noemy Queen MD  REFERRING PHYSICIAN: Noemy Queen MD    Chief Complaint   Patient presents with   • Follow-up     Ovarian Cancer      HISTORY OF PRESENT ILLNESS:   1. Recurrent ovarian cancer diagnosis established in 2013.  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  The patient was treated with surgery and then postoperatively with adjuvant chemotherapy consisting of Carboplatin and Taxol.  Six months later, she had recurrence of disease intra-abdominally between the rectum and vagina, which was treated with intraabdominal peritoneal chemotherapy.  She had been free of disease since that time.  She reported feeling a mass in the left side of her abdomen approximately six months ago.  This gradually grew and in early 2013 she had her daughter feel the mass.  The daughter works for Dr. David Rogers and the patient at that time also complained of intermittent rectal bleeding.  Consultation with Dr. David Rogers was obtained.  The patient had a CT scan of the abdomen and pelvis performed on 13 revealing left lower quadrant mass measuring 9.7 x 9.3 x 6 cm demonstrating areas of dense calcification, soft tissue density and cystic density within it.  Stromal tumor is felt to be most likely a possibly fibroma.  It is generated with necrosis and calcification.  Possible palpable mass in the rectus muscle is seen with calcification and deformity of the rectus muscle and just below this a second mass intra-abdominally was seen measuring 2 cm in the greatest diameter suspicious for second intraabdominal tumor.  The mass separate from the largest mass measuring 2.6 cm in the dependent portion of pelvis is seen on the right of the midline.  No retroperitoneal  lymphadenopathy was seen.  No free air, free fluid or other abnormality was present.  The patient was scheduled for an outpatient colonoscopy, but had syncopal episode while sitting in the toilet the night before and was brought to the emergency room early in the morning by her daughter and son-in-law.  The patient had apparently stopped breathing for a few seconds and did not have a pulse, but started breathing before CPR could be initiated.  In the emergency room, the patient had an EKG revealing sinus rhythm nonspecific T-wave flattening; metabolic panel revealed a glucose of 148, creatinine of 1.3, CBC was normal.  She was treated with intravenous fluid.  The patient’s case was then discussed with Dr. Anabell Monique and patient was subsequently admitted to [facility].  The patient underwent a colonoscopy by Dr. David Rogers on 06/27/13 revealing sigmoid diverticulosis and grade II internal and external hemorrhoids, but no mass or colitis was seen.  CT-guided biopsy of the mass was performed on 06/27/13 as well revealing metastatic papillary adenocarcinoma with numerous psammoma bodies.  Pathology comment stated prior records were not available; however, with history of ovarian cancer the current biopsy would be consistent with metastases from that malignancy.  Oncology consult was obtained and I initially saw the patient on 06/27/2013 where the patient had claimed to have little bit of pain in the area of disease.  She reported being frequently constipated, denies any weight loss or fevers.  She did report having some night sweats, but thought that was because of her menopause.  On 06/27/13 metastatic workup including labs and CT scans were initiated.  CT scan of the chest on 06/27/13 revealed no acute disease in the chest.  A 1.4 cm size focal area of decreased density was noted in the lateral aspect of the right lobe of the liver consistent with a benign cyst or hemangioma.  There was a very  small hiatal hernia measuring 2.2 cm in diameter.  A CT scan of the head performed 06/27/13 revealed no acute intracranial abnormality noted.  CA-125 was 40 units/ml (0-35), CA 19-9 was 11.8 units/ml (0-35), CEA level was 0.8 ng/ml (0-3), TSH level was 1.09 IU/ml (0.34-5.6).  The patient was in workup for the syncopal episode, a carotid Doppler was performed on June 28 revealing an essentially normal study showing a reduction in diameter from 0-15% bilaterally.  A Holter monitor was completed on 06/27/13, which was unremarkable except for a few premature atrial contractions.  The patient was felt stable and subsequently was discharged home on 06/28/13.  Post discharge, the patient was seen at [facility] on 07/05/13 by Dr. Kathryn Thrasher who discussed treatment options with the patient including surgery.  The patient is in the office today 07/16/13 in follow up post hospitalization.  She is reporting that surgery is planned for July 30th of this month at .  · 7/30/13 to 8/3/13 - The patient was in [facility].  The patient was admitted for surgery for her recurrent ovarian cancer.  The patient had exploratory laparotomy, omentectomy, bowel resection, liver biopsy and appendectomy.  Pathology revealed of the omentum metastatic serous carcinoma of the transverse colon, segmental resection showed metastatic serous carcinoma involving mesenteric fat.  The liver wedge resection showed fibrosclerotic thickening of the hepatic capsule.  Benign liver parenchyma.  No evidence of metastatic tumor.  Appendix showed fibrous obliteration of the lumen.  Negative for metastatic carcinoma.  Rectum and sigmoid colon segmental resection showed metastatic serous carcinoma invading the colorectal mucosa uninvolved by tumor.  Vaginal mass showed metastatic serous carcinoma.  Margins are positive for tumor.  · 9/24/13 - Chemotherapy orders were written for patient to start carboplatin 550  mg IV day one and Taxol 330 mg IV day one to be cycled every three weeks.  · 10/10/13 - The patient started cycle #1 of chemotherapy consisting of Carboplatin and Taxol.  · 09/25/13 -  is 10.6 normal.  · 10/01/13 - CT of the chest, abdomen and pelvis revealed new reticular nodular occupancy in the anterior segment of the right upper lobe, could reflect an inflammation or infectious etiology or could reflect unusual persistence of metastatic tumor.  New liver lesions, the smaller one appears to be implant on the surface of the liver, the larger may also represent an implant including the intrahepatic.  Several small mesenteric nodes seen throughout the abdomen and pelvis.  · 10/02/13 - Port placed by Dr. Sen.  · 10/24/13 - Orders written to start Neupogen daily and if more than three Neupogen are needed to give Neulasta after next chemo.  ANC is 0.15.  · 10/31/14 - Patient given cycle 2 of chemotherapy with Taxol and Carboplatin.  · 11/21/13 - The patient started cycle 3 of chemotherapy consisting of Carboplatin and Taxol.  · 11/26/13 - CT scan of chest, abdomen and pelvis revealed no evidence of active disease in the chest.  Reticulonodular opacity has resolved.  Metastatic lesion impressing upon the hepatic dome similar to prior exam.  No evidence of a change.  No evidence of new disease.  Postsurgical changes in the pelvis and throughout the retroperitoneum in the large bowel.  Finding containing anterior abdominal wall hernia containing fat tissue and enhancing vessels, 3 cm in diameter.  · 12/2/13 - Orders written to start Neupogen per protocol as well as Aranesp due to low hemoglobin and ANC.  ANC is 0.14.  Hemoglobin is 9.7.  · 12/11/13 - Orders written to hold chemotherapy until next week and decrease dose by 20% due to low platelet count.  Platelet count is 85,000.  · 12/16/13 - The patient received cycle 4 of Carboplatin and Taxol at a 20% dose reduction so Taxol dose is 260 mg and Carboplatin is 440  mg.  · 12/16/13 - CA-125 12.6 (N).  · 12/19/13 - PET/CT scan.  Impression:  1. There is no evidence of hypermetabolism in the liver.  Specifically of the dominant lesion in or adjacent to the right hepatic dome that was seen and described on the recent CT study of 11/26/2013.  It is photopenic relative to the surrounding liver.  2.  There is no evidence of hypermetabolic soft issue mass lesion or free fluid in the abdomen or pelvis.  3.  The tonsillar tissues are slightly enlarged and have moderate activity level.  This maybe physiologic.  Correlation with oral cavity, examination is recommended.  4.  Mild amount of activity in the right level II jugular lymph chain without focally enlarged lymph nodes is of questionable significance.  · 1/6/13 - The patient received cycle 5 of chemotherapy with Taxol and Carboplatin.  · 1/27/14 - The patient started on cycle 6 of Taxol and Carboplatin.  · 2/18/14 - CT of the abdomen and pelvis with contrast; stable lesions impressing upon the hepatic dome, unchanged compared to the prior exam.  Multiple anterior abdominal wall hernias re-demonstrated.  Those above the umbilicus contain omental fat.  Below and to the left of the umbilicus as anterior abdominal hernia containing omental fat in nondilated small bowel which is larger than seen previously.  · 2/20/14 - The patient received cycle 7 of chemotherapy with Taxol and Carboplatin.   · 3/11/14 - Order written to discontinue chemotherapy due to patient has completed 7 cycles of chemotherapy and CT scans suggest possible remission.  · 3/11/14 -  11.0 (N).  · 06/05/14 - CT scan of the chest abdomen and pelvis with contrast: There are multiple anterior abdominal wall hernias.  There are 2 larger anterior abdominal wall hernias at the level of the transverse colon, which contain omental fat.  There are at least 2 small anterior abdominal wall hernias that contain omental fat.  There is a moderate sized anterior abdominal wall  hernia at the level of the umbilicus, eccentric to the left, which contain non-dilated bowel.  Interval decrease in the size of two deposit impressing on the liver.  The third tiny deposit has been stable.  · 8/19/14 -  10.4 (N).  · 12/19/14 -   10.0 (N)  · 1/7/15 - CT chest, abdomen and pelvis with stable appearance of the chest with several micronodules. Small hiatal hernia, slightly larger than previous exam, increased from 1.5 to 2.5 cm in diameter. Bochdalek hernia unchanged. Multiple hepatic lesions. The two dominant lesions at the right hepatic dome had decreased in size from previous. The remaining tiny nodules measured 3-5 mm in diameter and were unchanged. There was no evidence of new intra-abdominal or pelvic mass or free fluid. There were multiple anterior abdominal wall hernias which had increased in size.   · 7/27/15 - Comprehensive metabolic panel with no significant abnormalities. CA-125 of 21 (0-35).   · 10/26/15 - WBC 6, hemoglobin 13, platelet count 204,000. Heart rate 47. CA-125 of 20 (0-35).    · 2/18/16 - CT chest with contrast with stable micronodular density seen in the lungs without significant change from prior exam. CT abdomen and pelvis with regression of liver lesions with no new evidence of metastasis. Multiple ventral hernias again noted without significant change from prior exam. Creatinine 0.9 (0.6-1.3).    · 2/25/16 - Patient hospitalized [facility] 2/25/16 and 2/27/16 with pyelonephritis secondary to E-coli. She was given two days of IV Rocephin and then placed on oral Levaquin. She was asked to hold her Coumadin, but then had nausea and vomiting because of Levaquin and stopped the Levaquin also. Her CA-125 was 32 (0-35) and CEA 1.3 (0-5). Her urine grew >100,000 E-coli. CT of the abdomen and pelvis was done which revealed 4.1 x 1.8 cm sized mild ovoid area of decreased density in the liver parenchyma along the anteromedial aspect of the dome of  the right lobe of the liver, unchanged significantly since the previous study of 2/18/16. There was suspicion of a very small pericardial effusion. There was suspicion of a small hiatal hernia. There were several hernias in the anterior abdominal wall. A 3.5 x 3.1 cm incised well-circumscribed abnormal density in the left retroperitoneal fatty soft tissue at the level of the left iliac crest was suggestive of tumor recurrence. There was an abnormal density in the left retroperitoneal soft tissues in the left flank that partially surrounded the left kidney and extended downward to the left pelvic area. Diverticulosis of the distal descending colon and sigmoid colon were seen.     · 3/8/16 - Patient prescribed Bactrim DS 1 p.o. b.i.d. for 10 days for pyelonephritis, which was partially treated with Rocephin and Levaquin. Urine with 40,000 E-coli treated with Macrobid for 7 days.  of 20 (0-35).    · 3/31/16 - PET scan with area of low density anteriorly in the right lobe of the liver with no significant radiopharmaceutical uptake to suggest malignancy. Multiple round soft tissue density masses showing increased radiopharmaceutical activity and multiple anterior abdominal wall hernias.   · 5/10/16 - Patient complains of venous enlargement of the left chest wall around the port. Has not been seen by  yet, but has an appointment on 5/23/16. Complained of a cough.   · 5/12/16 - Infusaport contrast study with no evidence of fibrin sheath. Chest x-ray with mild cardiac prominence.   · 5/23/16 - Patient seen in consultation by Sheng Bowman M.D. at [facility] and a chemotherapy trial recommended.   · 6/1/16 -   of 27.1 (0-35).    · 6/14/16 - WBC 5.71, hemoglobin 12.4, platelet count 188,000. Patient is scheduled for surgery at  on 6/16/16 after further discussion with  physicians. Discussed adjuvant chemotherapy.   · 6/16/16 - Excisional biopsy of abdominal wall mass performed by Sheng Bowman  M.D. at [facility] with pathology revealing metastatic high-grade papillary serous carcinoma.   · 7/7/16 - Received a call from the patient that Tramadol was not working and that she was given Norco #24 after her biopsy at  which did help her pain. Patient prescribed Norco 5/325 one p.o. q. 4-6 hours p.r.n. #60 with no refills. Echocardiogram with left ventricular inferior wall hypokinesis with ejection fraction of 50-55%.   · 7/8/16 - Patient seen in consultation by Sheng Bowman M.D. in consultation at  for metastatic high-grade papillary serous carcinoma diagnosed by excisional biopsy on 6/16/16 with carcinomatosis and patient not a surgical resection candidate. Genetic sequencing and susceptibility testing of tumor was ordered. Biopsy was done of anterior abdominal wall.   · 7/7/16 - Echocardiogram with left atrial enlargement. Mild mitral regurgitation. Left ventricular proximal inferior wall hypokinesis with ejection fraction of 50-55%.   · 7/11/16 - While waiting for genomics results, in order not to delay treatment further, order written for Taxotere 60 mg/M2 IV over one hour followed by Carboplatin AUC 5 over one hour, cycles to be repeated every three weeks.  Comprehensive metabolic panel normal.  26 (0-35).    · 725/16 - Pain contract signed for use of Norco.   · 8/5/16 - Patient stated that she experienced a red rash and was itchy post taking Norco. Norco discontinued and Tramadol refilled.   · 8/16/16 - Patient started cycle 1 chemotherapy. WBC 7.1, hemoglobin 11.2, MCV 88.7, platelet count 94,000. Patient complained of nausea for a week post chemo. Already getting Emend, Aloxi and Decadron. Added Omeprazole 40 mg daily orally.   · 8/17/16 - Urine culture with >100,000 E-coli.   · 8/25/16 - Cycle 2 chemotherapy given.   · 9/9/16 - Magnesium 1.3, replaced intravenously. Potassium 3.4 (3.6-5.1), increased oral potassium supplementation.    · 9/16/16 - Cycle 3 chemotherapy given.    · 9/19/16 - Comprehensive metabolic panel with no significant abnormality.   · 9/20/16 - CT abdomen and pelvis with evidence of progressive metastatic disease in the abdomen and pelvis with interval enlargement of several soft tissue attenuations and subcutaneous nodules in the anterior abdominal wall. Interval enlargement of a left pelvic soft tissue attenuation mass. A lentiform area of low attenuation in the dome of the liver stable in size. Multiple anterior abdominal wall hernias containing fat and/or bowel. Marked pelvic floor laxity. CT chest negative for evidence of metastatic disease. Tiny pulmonary nodules in the right lung stable since 2013 consistent with a benign etiology.   · 9/23/16 - Macrobid 100 mg p.o. b.i.d. x7 days prescribed for UTI. Current chemotherapy discontinued after discussion and new chemotherapy orders written. Carboplatin AUC-5 IV day 1 and Doxil (Liposomal Doxorubicin) 30 mg/M2 IV day 1 to cycle q. 21 days.  of 18 (0-35). Magnesium 1.6, replaced intravenously. Comprehensive metabolic panel with potassium 3.4 (3.6-5.1).     · 10/13/16 - Patient started on cycle 1 of Carboplatin and Liposomal Doxorubicin followed by Neulasta.   · 11/3/16 - Cycle 2 Carbo and Doxil given.   · 11/17/16 - Magnesium 1.0, replaced intravenously. Oral potassium supplement increased.   · 11/29/16 - CT chest with small non-calcified right pulmonary nodules unchanged. Benign bone island in the midthoracic vertebral body along with benign bony hemangiomas in the middle thoracic vertebral bodies. CT abdomen and pelvis with mild progressive metastatic disease from CT of September 2016. Anterior abdominal wall mass superiorly mildly increased in size with new area noted as described measuring 3 x 1.7 cm. 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 Stable area of decreased uptake in the dome of the liver not hypermetabolic on recent PET scan, likely representing  cyst or focal fatty infiltration. Stable small hiatal hernia, fat-containing Bochdalek’s hernia and anterior abdominal wall hernias with stable pelvic floor relaxation.    · 12/1/16 - Cycle 3 chemotherapy given.   · 12/8/16 - Current chemotherapy discontinued and patient started on Gemcitabine 1000 mg/M2 IV days 1, 8, 15 q. 28 days.   · 12/22/16 - Patient seen in followup by Juliana Roy M.D. at the pain clinic, treated with Oxycodone and Lyrica. Transaminases normal. Patient started cycle 1 chemotherapy.   · 12/29/16 - Magnesium 1.1 (1.8-2.5), replaced intravenously.   · 1/5/17 - Cycle 1 day 15 chemotherapy held as patient leaving for vacation to [place]. Chemotherapy dose decreased by 20%. Patient complains of diarrhea for which Imodium prescribed.   · 1/11/17 - BRCA-1 and BRCA-2 negative.   · 1/12/17 - Echocardiogram with ejection fraction 60% or greater.   · 1/19/17 - Comprehensive metabolic panel with no significant abnormality. Patient started cycle 2 chemotherapy.   · 2/2/17 - Urine with >100,000 E-coli treated with Cipro 500 mg p.o. b.i.d. x1 week.   · 2/6/17 - Magnesium 1.1 (1.8-2.5), replaced intravenously.    · 2/23/17 - Patient started cycle 3 chemotherapy.   · 2/27/17 - CT chest with interval development of too numerous to count very small pulmonary nodules, ill-defined ground glass opacities concerning for development of pulmonary metastatic disease. Stable left-sided chest port. CT abdomen and pelvis with stable appearance of multiple small soft tissue densities within the abdomen near midline subcutaneous fat of the abdominal wall. Interval decrease in size of largely calcified left pelvic mass and multiple fat in bowel containing small ventral hernias.   · 3/6/17 - Pulmonary consultation obtained for lung nodules. Discussed CT results with patient. Decision made to continue present chemotherapy until further lung evaluation as abdominal disease better.  of 18 (0-35).    · 3/16/17 -  Patient started cycle 4 chemotherapy, but treatment held from day 8 onwards because of hospitalization.   · 3/19/17 - Patient was hospitalized at [facility] between 3/19/17 and 3/25/17 with chest pain. Chest x-ray had revealed increased mild bibasilar airspace disease. Troponin I and EKG’s were negative. INR was elevated. Patient was treated with antibiotics. EGD was performed revealing small hiatal hernia and esophageal dilatation was performed. Bronchoscopy was performed also with no intrabronchial lesions identified. IgA was 51 (), IgG 320 (600-1500) and IgM 19 (). Lexiscan nuclear stress test had no evidence of reversible myocardial ischemia with normal left ventricular ejection fraction of 58%. CT chest had development of patchy bilateral ground glass opacities most pronounced involving the left upper lobe and bilateral lower lobes. Multiple tiny bilateral pulmonary nodules were less conspicuous. The patient underwent a bronchoscopy by Jerica Esparza M.D. with right upper lobe bronchoalveolar lavage revealing benign squamous cells and bronchial cells with pulmonary macrophages present. There was mild acute inflammation. Negative for malignant cells. Prilosec was changed to Famotidine for hypomagnesemia.    · 4/6/17 - Magnesium 1.2 (1.8-2.5). Comprehensive metabolic panel with no significant abnormality. Patient seen in follow-up by Fito Ram M.D. at [facility]. Treated with Lyrica and Oxycodone.    · 5/4/17 - Patient complains of a rash itching on her right upper arm. Eczematous rash noted and patient prescribed Cyclocort 0.1% b.i.d. Magnesium infusions continued with each chemo. Order written to resume chemotherapy.   · 5/16/17 - Cycle 5 chemotherapy started.   · 5/26/17 - Magnesium 1.4 (1.8-2.5), replaced intravenously. Potassium 2.9 (3.6-5.1), KCL increased to 20 mEq three in the morning and three in the evenings.   · 5/30/17 - PET scan with remaining metabolic activity within the  nodular areas. The suggested mass which is partially calcified and necrotic within the left aspect of the pelvis seems slightly larger with increased metabolic activity. There was more calcification in these areas compared to prior study, suggesting inflammation.      · 6/8/17 - Patient complaining of shortness of breath. Does take HCTZ at home. Chest x-ray ordered and chemotherapy continued along with weekly magnesium replacement. Chest x-ray with no acute cardiopulmonary abnormalities.   · 6/13/17 - Patient received cycle 6 of chemotherapy.   · 7/31/17 - WBC 5.0, hemoglobin 11.2, MCV 89.7, platelet count 217,000. Patient is to resume chemotherapy starting with cycle 7 on Wednesday of this week.  of 19 (<35). Potassium 3.3 (3.5-5.3).     · 8/2/17 - Patient began cycle 7 of chemotherapy.   · 8/30/17 - Patient started cycle 8 chemotherapy.   · 9/5/17 - Patient seen in followup at Pain Management by Fito Ram M.D. and continued on treatment with Oxycodone and Lyrica.   · 9/13/17 - Patient was hospitalized at [facility] for a day with dizziness secondary to dehydration, hypokalemia and anemia. She was given 1 unit of packed red blood cells and electrolytes were replaced. Chest x-ray revealed a subtle opacity in the left lateral lung base favored to represent atelectasis. Magnesium 1.3 (1.5-2.5), patient continued on replacement.    · 9/22/17 - Chemotherapy and magnesium replacement continued with decrease in chemotherapy dose by 10% secondary to cytopenias.   · 9/25/17 - Cycle 9 chemotherapy started.   · 10/12/17 - CT of the chest with contrast showed several tiny pulmonary nodules. One within the right lower lobe appears new from prior exams. Two adjacent foci of nodularity within the medial right lower lobe suggestive of minor infectious or inflammatory process. CT of the abdomen and pelvis with contrast which showed soft tissue nodules along the anterior abdominal and pelvic walls unchanged from previous  scan. Also a partially calcified mass within the left pelvis unchanged. No acute process identified within the abdomen or pelvis. Several left paracentral ventral hernias unchanged. No evidence of acute bowel obstruction.   · 10/16/17 - Order written for Levaquin 500 mg p.o. daily as the patient states that she has had a productive cough, fever and chills and with the results of the CT scan showing a possible infectious versus inflammatory process. Order also written to continue chemotherapy and magnesium replacement as previously prescribed.   · 10/23/17 - Cycle 10 chemotherapy started.   · 11/19/17 - Patient [facility] [facility] complaining of right lower extremity redness and fever for a day. Venous Doppler had no evidence of a DVT and patient was discharged home on Clindamycin.   · 11/21/17 -  of 17 (0-35).   · 12/4/17 - Cycle 11 chemotherapy started.   · 12/20/17 - PET scan with multiple hypermetabolic soft tissue nodules abutting the anterior abdominal wall, stable from previous examination. Peripherally calcified left lower quadrant mass near the ascending colon stable in size demonstrating no hypermetabolic uptake. Previously had maximum SUV of 7. Right medial basilar pulmonary nodules resolved.   · 12/22/17 - CT left lower extremity with soft tissue swelling with skin thickening present along the anterior and medial aspect of the leg, slightly more pronounced around the palpable marker seen within the upper medial aspect of the lower extremity.   · 12/26/17 - Elastic stockings prescribed for lower extremity edema.   · 1/8/18 - Patient hospitalized at [facility] between 1/8/18 and 1/11/18 with fever of up to 101 degrees and painful rash on the lower extremities of several weeks’ duration. She was found to be hypokalemic and MRI of the lower extremities revealed thickening and swelling. Chest x-ray had no acute cardiopulmonary abnormality. Skin biopsy was performed by Surgery revealing stasis  dermatitis and no evidence of malignancy. Infectious Disease consultation was obtained and IV antibiotics were stopped. Blood cultures had no growth.   · 1/22/18 - Patient asked to start wearing elastic stockings which she has not started yet. She was given a prescription for Dyazide 1 p.o. daily.   · 2/26/18 - Ordered chemo to resume again. Patient unaware that she was supposed to resume chemo after her last visit in January. Continue magnesium infusions on day 1, 8 and 15. Prescribed Levaquin 500 mg p.o. daily x10 days for productive cough x1 week. History of viral illness consistent with influenza.  of 18 (0-35). Chest x-ray with no acute process.    · 3/5/18 - Cycle 12 chemotherapy started.   · 3/26/18 - Options discussed with patient. Decision made to discontinue IV Gemzar and orders written to start on Niraparib (Zejula) 300 mg p.o. daily as maintenance therapy.   · 4/11/18 - Niraparib discontinued due to insurance denial. Orders written to start Carboplatin-AUC 5 IV day 1 cycling every 21 days. Orders written for Neulasta 6 mg subcutaneous kit day 1 with palliative treatment intent and expected duration of treatment three months.   · 4/12/18 - CT chest, abdomen and pelvis with contrast revealed numerous tiny centrilobular nodules in the lungs favored to reflect infectious or inflammatory process. Nodules ranged 1-3 mm in size and are new from prior studies with metastatic disease not entirely excluded. Stable small hiatal hernia. Interval progression of metastatic disease involving the subcutaneous tissues at the ventral abdominal wall. Multiple soft tissue density nodules previously shown to be hypermetabolic on PET scan. New small crescent of low attenuation material along the periphery of the spleen with similar finding at the dome of the liver. Findings are concerning for peritoneal metastases. Density of the dome of the liver remained unchanged from multiple prior studies. Stable calcified mass in  the left pelvic sidewall. Urinary bladder wall thickening.   · 4/23/18 - Cycle 1 chemotherapy with Carboplatin administered along with Neulasta injection.   · 4/26/18 - Neulasta discontinued due to insurance denial.   · 5/14/18 - Patient received cycle 2 Carboplatin.   · 6/5/18 - Cycle 3 day 1 chemotherapy with Carboplatin administered.   · 6/20/18 - CT chest, abdomen and pelvis at [facility] showed re-demonstration of soft tissue mass in the ventral wall hernia left of the midline as well as the dome of the liver, likely representing metastatic disease similar as compared to the prior study. Additional calcified lesions present in the upper left hemipelvis and along the anterior abdominal wall to the right of the midline also likely representing metastatic disease. No definite new lesions were identified. Moderate to large stool burden likely related to mild constipation was present. No suspicious lung nodules were identified. The previously-described ground glass nodules appeared to have resolved. There was a stable subpleural nodule in the right upper lobe measuring 3 mm present since 2014 without significant changes.   · 6/26/18 - Cycle 4 day 1 Carboplatin administered with addition of Neulasta for febrile neutropenia prophylaxis.   · 6/29/18 - Reviewed CT scans with patient. Orders written to discontinue Carboplatin and Neulasta and plan to start Niraparib 300 mg by mouth daily as maintenance therapy. Prescribed Amlodipine 2.5 mg p.o. daily for chemo-induced hypertension.   · 7/18/18 - Orders written to increase weekly Magnesium Sulfate to 3 g IV weekly due to continued hypomagnesemia.   · 8/1/18 - Patient reports she has not received Niraparib (Zejula) to date.   · 8/30/18 - Patient’s phone was not working so though Niraparib approved, the drug company had not been able to get ahold of her. Patient supplied with drug company number so that she can start taking the pills.   · 9/6/18 -  of 20 (N).    · 9/18/18 - Urinalysis done for dysuria and back pain. Urine culture grew 20,000 to 50,000 colonies of urogenital ruy. Prescribed Bactrim DS one tablet twice daily for one week.   · September 2018 - Patient initiated on Zejula 300 mg p.o. daily.   · 10/1/18 - WBC 3.5, hemoglobin 12, platelet count 74,000. Niraparib (Zejula) dose held and then resumed when platelets above 100,000 at a dose of 200 mg daily.   · 10/15/18 - Patient received Niraparib (Zejula) at 200 mg p.o. daily with a platelet count of 198,000.   · 11/7/18 - WBC 6, hemoglobin 11.6, MCV 96.2, platelet count 137,000. Patient claims to have stopped taking Niraparib a few days prior because of cough. Asked to resume taking it. Ciprofloxacin 500 mg p.o. twice daily for one week for bronchitis.   · 12/3/18 - Magnesium Sulfate infusions changed to every two weeks, to send mag level before and give 3 grams. DC’d Magnesium Oxide and started Magnesium Plus Protein two pills twice daily.   · 1/4/19 - CT chest, abdomen and pelvis with new patchy nodular areas of airspace consolidation within the bilateral upper lobes, left greater than right, favored to be infectious or inflammatory. Interval enlargement of the peritoneal soft tissue masses within the pelvis compatible with progression of disease. Enlarging ventral hernia now containing multiple loops of small bowel and colon.   · 1/7/19 - Patient has not been coming in for weekly magnesium replacement as nursing has not been able to get ahold of her. Results of CT’s discussed with patient. Decision made to change her to IV chemotherapy with Carboplatin AUC-5 day 1 and pegylated liposomal Doxorubicin (Doxil) 30 mg/M2 IV day 1 to cycle every four weeks. Patient made aware of the limited choices of her treatment available.   · 1/31/19 - Echocardiogram showed LVEF 50-55% and otherwise normal echo Doppler study.   · 2/4/19 - New chemotherapy not initiated to date with difficulty contacting patient for  echocardiogram. Neulasta On-Pro 6 mg ordered with chemotherapy due to extensive prior exposure to chemotherapy, increasing risk of febrile neutropenia, history of neutropenic events on prior chemotherapy regimens.   · 2/15/19 - Patient started cycle 1 chemotherapy with Neulasta support.   · 3/6/19 -  of 28 (0-35).   · 3/15/19 - Cycle 2 Carboplatin with Liposomal Doxorubicin (Doxil) and Neulasta support initiated.     · 4/10/19 - Patient was requested to followup with Dr. Queen regarding hypotension and blood pressure medication dosing. Patient appears to be tolerating treatment well with cytopenias not requiring dose adjustments. No Doxil-related skin or mucus membrane toxicities or other significant toxicities to date.   · 4/12/19 - Cycle 3 chemotherapy given.   · 4/16/19 - CT chest, abdomen and pelvis with no evidence of active metastasis to the chest. Several tree-in-bud nodules within the periphery of the inferior right upper lobe likely infectious or inflammatory. Disease burden within abdomen and pelvis stable to slightly increased from prior CT. Specifically, a soft tissue mass along the right rectus muscle slightly increased in bulk. Multiple ventral hernias, some containing loops of bowel, with no evidence of acute bowel obstruction.   · 5/8/19 - Discussed CT results with patient. Overall stable disease and would like to continue same treatment. Dove soap and Hydrocortisone Cream p.r.n. prescribed for scattered rash on back.   · 5/10/19 - Cycle 4 Carboplatin and Liposomal Doxorubicin initiated.   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 It was ER positive, HER2/zuleima negative, PD-L1 negative. There was TOPO1 positivity and TUBB3 positivity. TMB was low (two mutations per megabyte MUTS/MB) and MSI was stable. There were 13 therapies considered with increased benefit. The 13 therapies with increased benefit included Fulvestrant,  Irinotecan, Letrozole, Topotecan, Anastrazole, Exemestane, Tamoxifen, all of which were referenced to [facility] as well as Abemaciclib, Everolimus, Gefitinib, Palbociclib, Ribociclib and Toremifene.     · 6/4/19 - Past consented to [facility] registry by research coordinator, Genoveva Vu R.N. Patient tolerating Carboplatin and Liposomal Doxorubicin well with some expected cytopenias and some mild and tolerable Neulasta induced bone pain.  · 6/5/19 echocardiogram with preserved LV systolic function with EF around 60%.  · 6/7/19 cycle 5 chemotherapy with Neulasta support given.  Patient continued on mag oxide 400 mg p.o. twice daily and weekly IV 3 g mag sulfate infusions for persistent hypomagnesemia.  · 7/5/2019- cycle 6 chemotherapy with carboplatin and doxorubicin with Neulasta port initiated.  Ca1 2521 (0-35).  · 7/23/2019-CT chest abdomen and pelvis performed.  Interpretation pending comparison to most recent scans April 2019 at [facility].    2. Deep vein thrombosis of left upper extremity diagnosis established in October of 2013.  · 10/16/13 - Venous Doppler of left upper extremity.  Impression:  Deep vein thrombosis involving the subclavian, axillary and brachial veins on the left side, superficial vein thrombosis involving the left basilic vein.  · 10/16/13 - Order written for Lovenox 120 mg subcutaneously daily until INR is in therapeutic range.  Order written for Coumadin 5 mg p.o. daily.  The patient is to follow PT and INR protocol.  · 5/20/16 - Venous Doppler bilateral lower extremities normal.  · 7/30/19 - Patient continued on Coumadin following the INR protocol.     3. Anemia diagnosis established in November 2013 and pernicious anemia diagnosis established March 2016.   · 11/15/13 - Hemoglobin is 7.8.  · 12/2/13 - Orders written to start Aranesp 200 mg subcutaneous every two weeks due to low hemoglobin secondary to chemo induced anemia.  Hemoglobin is 9.7.  Anemia workup is ordered.  · 12/03/13  - Ferritin 179.8 (N), folic acid 8.3 (N), vitamin B12 314, iron 104 (N), TIBC 298 (N), iron saturation 35 (N), Reticulocyte count 0.88 (N).  EPO level 124 (N).  Haptoglobin 22 (H).  · 10/22/14 - Hemoglobin 12.5, hematocrit 37.3, MCV 91.6.  · 10/23/14 - Anemia resolved.   · 3/8/16 - WBC 5.8, hemoglobin 11.7, MCV 90.3, platelet count 245,000. Vitamin B12 of 189 (180-914), ferritin 61 (), iron 91 (), TIBC 345 (228-428).   · 3/15/16 -  ().        · 3/22/16 - Intrinsic factor antibody positive, antiparietal antibody negative. Vitamin B12 at 1000 mcg IM weekly started, but the patient after receiving first dose on 3/22/16 did not come back for injections until 4/13/16.   · 4/13/16 - WBC 5.1, hemoglobin 12.5, MCV 87.9, platelet count 201,000. Patient given B12 injection and then continued at home.   · 5/10/16 - WBC 5.1, hemoglobin 12.5, platelet count 201,000.   · 6/14/16 - Monthly Vitamin B12 injections continued at home.   · 7/11/16 - WBC 5.48, hemoglobin 12.7, MCV 86.2, platelet count 200,000.   · 8/16/16 - Patient continued on monthly Vitamin B12 injections at home.   · 1/5/17 - WBC 2.6 with 38% neutrophils, 56% lymphocytes, 5% monocytes, hemoglobin 10.5, .3, platelet count 63,000. Patient continued on Vitamin B12 injections 1000 mcg IM monthly at home.   · 3/20/17 - Retic 0.41 (0.5-1.5), creatinine 1.0 (0.4-1.0). Iron 12 (), TIBC 270 (228-428), ferritin 259 (), haptoglobin 340 (), TSH 0.43 (0.34-5.6), folate 6.0 (5.9-24.8). Serum protein electrophoresis revealed decreased gammaglobulins. Serum EDU had no monoclonal gammopathy identified. Stool Hemoccult was negative.   · 6/8/17 - WBC 6.3, hemoglobin 8.3, MCV 92.4, platelet count 50,000. Procrit 40,000 units subq weekly added for chemotherapy-induced anemia. Patient continued on Vitamin B12 at 1000 mcg IM monthly at home.   · 7/5/17 - Iron 33 (), TIBC 328 (228-428), ferritin 211 (), TSH 2.46 (0.34-5.6).        · 7/31/17 - WBC 5.0, hemoglobin 11.2, MCV 89.7, platelet count 217,000. We will continue with the Procrit 40,000 units subq weekly for chemo-induced anemia when the hemoglobin falls below 10.0 and the patient is also to continue with the Vitamin B12 at 1000 mcg IM monthly at home. Creatinine 1.24 (0.5-0.99).    · 8/28/17 - WBC 9.6, hemoglobin 8.7, MCV 93.7, platelet count 133,000. Patient is to continue with the Procrit 40,000 units subq weekly and will receive that today. She is also to continue with the Vitamin B12 at 1000 mcg IM monthly at home.  · 10/16/17 - WBC 7.5, hemoglobin 9.6, MCV 98.4, platelet count 195,000. Patient is to continue with Procrit 40,000 units subq weekly and will receive Procrit today.   · 1/1/18 - Creatinine 1.3 (0.4-1.0).    · 2/19/18 - Procrit given for hemoglobin 9.9.   · 2/26/18 - Continue Procrit 40,000 units weekly p.r.n. hemoglobin <10. Continue Vitamin B12 at 1000 mcg IM monthly at home.   · 3/26/18 - Hemoglobin 8.3. Procrit dose increased to 60,000 units weekly. Iron 29 (), TIBC 306 (228-428), and iron sat 9% (15-50). Iron 29 (), TIBC 306 (228-428), iron saturation 9% (15-50).   · 3/27/18 - Order written to start Ferrous sulfate 325 mg p.o. b.i.d.    · 4/2/18 - Stool heme negative x3.   · 4/30/18 - Patient had not started Ferrous sulfate 325 mg p.o. b.i.d. and verbalized understanding to do so and to notify the office if side effects are not tolerable.   · 5/24/18 - Patient was hospitalized at [facility] between 5/24/18 and 5/26/18 with dark tarry stool. INR was subtherapeutic. She was given IV Protonix and Protonix 40 mg daily. She underwent an EGD by David Rogers M.D. revealing small hiatal hernia, small submucosal nodule near the cardia, erosive gastritis. Pathology on gastric antrum and body biopsy revealed iron pill gastritis and no evidence of Helicobacter pylori. She then underwent a colonoscopy revealing diverticulosis, hemorrhoids and surgical changes  from previous resection. It was recommended to consider outpatient M2A if hemoglobin continued to drop.   · 6/4/18 - Order written to discontinue iron pills and to use Injectafer 750 mg IV days 1 and 8 for low iron sats. Order written for Procrit 40,000 units subq weekly. Iron 65 (), TIBC 328 (228-428), iron saturation 20% (15-50), ferritin 123 ().   · 6/29/18 - Discussed patient’s intolerance of oral iron due to gastritis. Oral iron discontinued with plans for Injectafer should iron level drop in the future.   · 11/7/18 - Patient claims not to be taking Vitamin B12 injections at home. Asked to resume those.   · 12/3/18 - Patient reports she has not been taking her B12 injections for a couple of months. She needs some syringes and needles for that.   · 2/4/19 - Patient is uncertain if she is currently taking Ferrous Sulfate or not. Patient with history of intolerance and will follow hemoglobin.   · 5/8/19 - Ferritin 64 ().    · 7/30/19-Hgb 10.1.     Past Medical History:   Diagnosis Date   • Hypertension    • Osteoporosis    • Ovarian cancer (CMS/HCC)        Past Surgical History:   Procedure Laterality Date   • COLON SURGERY     • COLONOSCOPY     • EXPLORATORY LAPAROTOMY     • HERNIA REPAIR     • HYSTERECTOMY     • OOPHORECTOMY           Current Outpatient Medications:   •  Acetaminophen 500 MG coapsule, Take 100 mg by mouth 4 (Four) Times a Day. As needed, Disp: , Rfl:   •  atorvastatin (LIPITOR) 20 MG tablet, Take 20 mg by mouth every night at bedtime., Disp: , Rfl: 3  •  Cyanocobalamin (B-12 COMPLIANCE INJECTION) 1000 MCG/ML kit, Inject 1,000 mcg into the appropriate muscle as directed by prescriber Every 30 (Thirty) Days., Disp: , Rfl:   •  famotidine (PEPCID) 40 MG tablet, Take 40 mg by mouth Every Morning Before Breakfast., Disp: , Rfl: 3  •  hydrochlorothiazide (HYDRODIURIL) 25 MG tablet, Take 25 mg by mouth Daily., Disp: , Rfl:   •  lisinopril (PRINIVIL,ZESTRIL) 20 MG tablet, Take 20 mg by  "mouth Daily., Disp: , Rfl:   •  LYRICA 100 MG capsule, Take 100 mg by mouth 3 (Three) Times a Day., Disp: , Rfl: 1  •  magnesium oxide (MAG-OX) 400 MG tablet, Take 1 tablet by mouth 2 (Two) Times a Day., Disp: 60 tablet, Rfl: 3  •  ondansetron ODT (ZOFRAN-ODT) 8 MG disintegrating tablet, PLACE 1 TABLET IN MOUTH TO DISSOLVE EVERY 8 HOURS AS NEEDED FOR NAUSEA, Disp: 20 tablet, Rfl: 3  •  oxyCODONE (ROXICODONE) 10 MG tablet, TAKE 1/2 TO 1 TABLET BY MOUTH THREE TIMES A DAY AS NEEDED FOR PAIN, Disp: , Rfl: 0  •  potassium chloride (KLOR-CON) 20 MEQ CR tablet, Take 20 mEq by mouth Daily., Disp: , Rfl:   •  sertraline (ZOLOFT) 50 MG tablet, TAKE 1 TABLET BY MOUTH EVERY EVENING BEFORE BED, Disp: 90 tablet, Rfl: 1  •  Syringe/Needle, Disp, (B-D 3CC LUER-JESSICA SYR 23GX1\") 23G X 1\" 3 ML misc, BD LUER-JESISCA SYRINGE 23G X 1\" 3 ML, Disp: , Rfl:   •  temazepam (RESTORIL) 15 MG capsule, TAKE ONE CAPSULE BY MOUTH AT BEDTIME AS NEEDED FOR TROUBLE SLEEPING, Disp: 30 capsule, Rfl: 1  •  triamterene-hydrochlorothiazide (DYAZIDE) 37.5-25 MG per capsule, TAKE 1 CAPSULE BY MOUTH EVERY DAY, Disp: 90 capsule, Rfl: 1  •  valACYclovir (VALTREX) 500 MG tablet, Take 1,000 mg by mouth Daily., Disp: , Rfl:   •  warfarin (COUMADIN) 5 MG tablet, Take 5 mg by mouth Daily., Disp: , Rfl: 3  •  famotidine (PEPCID) 20 MG tablet, Take 20 mg by mouth 2 (Two) Times a Day., Disp: , Rfl:   •  oxyCODONE (OXY-IR) 5 MG capsule, Take 5 mg by mouth As Needed., Disp: , Rfl:   •  pregabalin (LYRICA) 75 MG capsule, Take 75 mg by mouth 3 (Three) Times a Day., Disp: , Rfl:   No current facility-administered medications for this visit.     Facility-Administered Medications Ordered in Other Visits:   •  heparin flush (porcine) 100 UNIT/ML injection 500 Units, 500 Units, Intravenous, PRN, Daryn Tay MD  •  sodium chloride 0.9 % flush 10 mL, 10 mL, Intravenous, PRN, Daryn Tay MD, 30 mL at 07/30/19 0909    Allergies   Allergen Reactions   • Morphine Nausea Only " and Hives   • Penicillin V Potassium Rash   • Sulfa Antibiotics Rash   • Levaquin [Levofloxacin] Nausea Only and Dizziness     When taken with Coumadin   • Amoxicillin Rash   • Norco [Hydrocodone-Acetaminophen] Rash       Family History   Problem Relation Age of Onset   • Hypertension Mother    • Cancer Father    • Hypertension Father    • Hypertension Sister    • Hypertension Brother    • Stroke Brother        Cancer-related family history includes Cancer in her father.    Social History     Tobacco Use   • Smoking status: Never Smoker   Substance Use Topics   • Alcohol use: No     Frequency: Never   • Drug use: No       I have reviewed the history of present illness, past medical history, family history, social history, lab results, all notes and other records since the patient was last seen on 7/2/2019.    SUBJECTIVE: The patient is here for a follow up of Ovarian Cancer. SIGIFREDO Sinclair, present during office visit. Reported mouth sores with the last chemotherapy.           REVIEW OF SYSTEMS:  Review of Systems   Constitutional: Negative for appetite change, chills, fatigue and fever.   HENT: Negative for dental problem, ear pain, mouth sores, nosebleeds and sore throat.    Eyes: Negative for photophobia and visual disturbance.   Respiratory: Negative for cough, shortness of breath, wheezing and stridor.    Cardiovascular: Negative for chest pain and palpitations.   Gastrointestinal: Positive for constipation. Negative for abdominal pain, diarrhea, nausea and vomiting.   Endocrine: Negative for cold intolerance and heat intolerance.   Genitourinary: Negative for difficulty urinating, dysuria and hematuria.   Musculoskeletal: Negative for joint swelling and neck stiffness.   Skin: Negative for color change and rash.   Neurological: Negative for seizures, syncope, weakness and light-headedness.   Hematological: Negative for adenopathy. Does not bruise/bleed easily.        No obvious bleeding  "  Psychiatric/Behavioral: Negative for agitation, confusion and hallucinations.   All other systems reviewed and are negative.      OBJECTIVE:    Vitals:    07/30/19 0853   BP: 117/66   Pulse: 50   Resp: 16   Temp: 97.9 °F (36.6 °C)   Weight: 82.1 kg (181 lb)   Height: 139.7 cm (55\")   PainSc: 0-No pain     ECOG  (1) Restricted in physically strenuous activity, ambulatory and able to do work of light nature    Physical Exam   Constitutional: She is oriented to person, place, and time. She appears well-developed and well-nourished. No distress.   HENT:   Head: Normocephalic and atraumatic.   Nose: Nose normal.   Mouth/Throat: Oropharynx is clear and moist. No oropharyngeal exudate.   Dental fillings and torus pleneous.    Eyes: Conjunctivae and EOM are normal. Pupils are equal, round, and reactive to light. Right eye exhibits no discharge. Left eye exhibits no discharge. No scleral icterus.   Eye glasses present.    Neck: Normal range of motion. Neck supple. No thyromegaly present.   Left chest wall port.    Cardiovascular: Normal rate, regular rhythm, normal heart sounds and intact distal pulses. Exam reveals no gallop and no friction rub.   No murmur heard.  Pulmonary/Chest: Effort normal and breath sounds normal. No stridor. No respiratory distress. She has no wheezes.   Abdominal: Soft. Bowel sounds are normal. She exhibits no distension and no mass. There is no tenderness. There is no rebound and no guarding.   Musculoskeletal: Normal range of motion. She exhibits no edema, tenderness or deformity.   Lymphadenopathy:     She has no cervical adenopathy.     She has no axillary adenopathy.        Right: No supraclavicular adenopathy present.        Left: No supraclavicular adenopathy present.   Neurological: She is alert and oriented to person, place, and time. She exhibits normal muscle tone. Coordination normal.   Skin: Skin is warm. No rash noted. She is not diaphoretic. No erythema. No pallor.   Psychiatric: " She has a normal mood and affect. Her behavior is normal.   Nursing note and vitals reviewed.    RECENT LABS  WBC   Date Value Ref Range Status   07/30/2019 3.72 3.40 - 10.80 10*3/mm3 Final     RBC   Date Value Ref Range Status   07/30/2019 2.99 (L) 3.77 - 5.28 10*6/mm3 Final     Hemoglobin   Date Value Ref Range Status   07/30/2019 10.1 (L) 12.0 - 15.9 g/dL Final     Hematocrit   Date Value Ref Range Status   07/30/2019 31.5 (L) 34.0 - 46.6 % Final     MCV   Date Value Ref Range Status   07/30/2019 105.4 (H) 79.0 - 97.0 fL Final     MCH   Date Value Ref Range Status   07/30/2019 33.8 (H) 26.6 - 33.0 pg Final     MCHC   Date Value Ref Range Status   07/30/2019 32.1 31.5 - 35.7 g/dL Final     RDW   Date Value Ref Range Status   07/30/2019 15.3 12.3 - 15.4 % Final     RDW-SD   Date Value Ref Range Status   07/30/2019 58.4 (H) 37.0 - 54.0 fl Final     MPV   Date Value Ref Range Status   07/30/2019 10.7 6.0 - 12.0 fL Final     Platelets   Date Value Ref Range Status   07/30/2019 157 140 - 450 10*3/mm3 Final     Neutrophil %   Date Value Ref Range Status   07/30/2019 43.5 42.7 - 76.0 % Final     Lymphocyte %   Date Value Ref Range Status   07/30/2019 32.8 19.6 - 45.3 % Final     Monocyte %   Date Value Ref Range Status   07/30/2019 21.0 (H) 5.0 - 12.0 % Final     Eosinophil %   Date Value Ref Range Status   07/30/2019 2.2 0.3 - 6.2 % Final     Basophil %   Date Value Ref Range Status   07/30/2019 0.5 0.0 - 1.5 % Final     Neutrophils, Absolute   Date Value Ref Range Status   07/30/2019 1.62 (L) 1.70 - 7.00 10*3/mm3 Final     Lymphocytes, Absolute   Date Value Ref Range Status   07/30/2019 1.22 0.70 - 3.10 10*3/mm3 Final     Monocytes, Absolute   Date Value Ref Range Status   07/30/2019 0.78 0.10 - 0.90 10*3/mm3 Final     Eosinophils, Absolute   Date Value Ref Range Status   07/30/2019 0.08 0.00 - 0.40 10*3/mm3 Final     Basophils, Absolute   Date Value Ref Range Status   07/30/2019 0.02 0.00 - 0.20 10*3/mm3 Final      [facility]   Date Value Ref Range Status   12/17/2018 0 0 /100[WBCs] Final       Lab Results   Component Value Date    GLUCOSE 84 07/05/2019    BUN 16 07/05/2019    CREATININE 0.90 07/05/2019    EGFRIFNONA 63 07/05/2019    EGFRIFAFRI >60 06/07/2019    BCR 17.8 07/05/2019    K 4.1 07/05/2019    CO2 24.0 07/05/2019    CALCIUM 9.1 07/05/2019    ALBUMIN 3.60 07/05/2019    LABIL2 1.5 06/07/2019    AST 26 07/05/2019    ALT 14 07/05/2019       ASSESSMENT:    Malignant neoplasm of right ovary (CMS/HCC)    Chronic deep vein thrombosis (DVT) of left upper extremity, unspecified vein (CMS/HCC)    Hypomagnesemia  - Magnesium    Pernicious anemia  - CBC & Differential  - CBC & Differential  - CBC Auto Differential    Other iron deficiency anemia  - CBC & Differential  - CBC & Differential  - CBC Auto Differential  - Ferritin  - Iron Profile    Malabsorption due to intolerance, not elsewhere classified  - CBC & Differential  - CBC & Differential  - CBC Auto Differential    Pancytopenia due to antineoplastic chemotherapy (CMS/HCC)  - CBC & Differential  - CBC & Differential  - CBC Auto Differential    Encounter for antineoplastic chemotherapy    Monitoring for long-term anticoagulant use    Chart reviewed since the last provider visit.  She is tolerating treatment with some continued anemia.  She is compliant with her vitamin B12 injections at home.  She does not tolerate oral iron with a history of iron deficiency.  Her last ferritin was normal.  Tumor marker was normal.  Her CT scans were not compared to the most recent scans and this will be reviewed as soon as possible.  Her INR at her last physician visit was within range.  She is continued on Coumadin doubt significant bleeding events.    PLAN:     1. Continue chemotherapy Friday (8/2/19). However, this plan may change once the CT has been resulted.   2. Will check a Magnesium level today and replace on Friday (8/2/19) with Magnesium Sulfate 2gm IV if it is less than 1.7  today.   3. Continue Coumadin and INR monitoring, range of 2-3.   4. Continue Vitamin B12 1,000mcg IM injections at home.   5.  Plan to repeat iron studies next physician visit.  Should hemoglobin drop further and iron studies be okay, she may need Procrit.       I have reviewed labs results, imaging, vitals, and medications with the patient today. Will follow up in one month with Dr. Tay.     Patient verbalized understanding and is in agreement of the above plan.    I have reviewed and validate the information entered by SIGIFREDO Sinclair.     Much of the above report is an electronic transcription//translation of the spoken language to printed text using Dragon Software. As such, the subtleties and finesse of the spoken language may permit erroneous, or at times, nonsensical words or phrases to be inadvertently transcribed; thus changes may be made at a later date to rectify these errors.

## 2019-07-26 PROBLEM — Z51.11 ENCOUNTER FOR ANTINEOPLASTIC CHEMOTHERAPY: Status: ACTIVE | Noted: 2019-07-26

## 2019-07-30 ENCOUNTER — TELEPHONE (OUTPATIENT)
Dept: ONCOLOGY | Facility: CLINIC | Age: 64
End: 2019-07-30

## 2019-07-30 ENCOUNTER — HOSPITAL ENCOUNTER (OUTPATIENT)
Dept: ONCOLOGY | Facility: HOSPITAL | Age: 64
Discharge: HOME | End: 2019-07-30

## 2019-07-30 ENCOUNTER — OFFICE VISIT (OUTPATIENT)
Dept: ONCOLOGY | Facility: CLINIC | Age: 64
End: 2019-07-30

## 2019-07-30 ENCOUNTER — HOSPITAL ENCOUNTER (OUTPATIENT)
Dept: ONCOLOGY | Facility: HOSPITAL | Age: 64
Discharge: HOME | End: 2019-07-30
Admitting: INTERNAL MEDICINE

## 2019-07-30 VITALS
HEIGHT: 55 IN | TEMPERATURE: 97.9 F | BODY MASS INDEX: 41.89 KG/M2 | HEART RATE: 50 BPM | WEIGHT: 181 LBS | SYSTOLIC BLOOD PRESSURE: 117 MMHG | RESPIRATION RATE: 16 BRPM | DIASTOLIC BLOOD PRESSURE: 66 MMHG

## 2019-07-30 DIAGNOSIS — E83.42 HYPOMAGNESEMIA: ICD-10-CM

## 2019-07-30 DIAGNOSIS — D50.8 OTHER IRON DEFICIENCY ANEMIA: ICD-10-CM

## 2019-07-30 DIAGNOSIS — D61.810 PANCYTOPENIA DUE TO ANTINEOPLASTIC CHEMOTHERAPY (HCC): ICD-10-CM

## 2019-07-30 DIAGNOSIS — D51.0 PERNICIOUS ANEMIA: ICD-10-CM

## 2019-07-30 DIAGNOSIS — I82.722 CHRONIC DEEP VEIN THROMBOSIS (DVT) OF LEFT UPPER EXTREMITY, UNSPECIFIED VEIN (HCC): ICD-10-CM

## 2019-07-30 DIAGNOSIS — C56.1 MALIGNANT NEOPLASM OF RIGHT OVARY (HCC): Primary | ICD-10-CM

## 2019-07-30 DIAGNOSIS — K90.49 MALABSORPTION DUE TO INTOLERANCE, NOT ELSEWHERE CLASSIFIED: ICD-10-CM

## 2019-07-30 DIAGNOSIS — T45.1X5A PANCYTOPENIA DUE TO ANTINEOPLASTIC CHEMOTHERAPY (HCC): ICD-10-CM

## 2019-07-30 DIAGNOSIS — Z51.11 ENCOUNTER FOR ANTINEOPLASTIC CHEMOTHERAPY: ICD-10-CM

## 2019-07-30 DIAGNOSIS — Z79.01 MONITORING FOR LONG-TERM ANTICOAGULANT USE: ICD-10-CM

## 2019-07-30 DIAGNOSIS — Z51.81 MONITORING FOR LONG-TERM ANTICOAGULANT USE: ICD-10-CM

## 2019-07-30 LAB
BASOPHILS # BLD AUTO: 0.02 10*3/MM3 (ref 0–0.2)
BASOPHILS NFR BLD AUTO: 0.5 % (ref 0–1.5)
DEPRECATED RDW RBC AUTO: 58.4 FL (ref 37–54)
EOSINOPHIL # BLD AUTO: 0.08 10*3/MM3 (ref 0–0.4)
EOSINOPHIL NFR BLD AUTO: 2.2 % (ref 0.3–6.2)
ERYTHROCYTE [DISTWIDTH] IN BLOOD BY AUTOMATED COUNT: 15.3 % (ref 12.3–15.4)
HCT VFR BLD AUTO: 31.5 % (ref 34–46.6)
HGB BLD-MCNC: 10.1 G/DL (ref 12–15.9)
LYMPHOCYTES # BLD AUTO: 1.22 10*3/MM3 (ref 0.7–3.1)
LYMPHOCYTES NFR BLD AUTO: 32.8 % (ref 19.6–45.3)
MCH RBC QN AUTO: 33.8 PG (ref 26.6–33)
MCHC RBC AUTO-ENTMCNC: 32.1 G/DL (ref 31.5–35.7)
MCV RBC AUTO: 105.4 FL (ref 79–97)
MONOCYTES # BLD AUTO: 0.78 10*3/MM3 (ref 0.1–0.9)
MONOCYTES NFR BLD AUTO: 21 % (ref 5–12)
NEUTROPHILS # BLD AUTO: 1.62 10*3/MM3 (ref 1.7–7)
NEUTROPHILS NFR BLD AUTO: 43.5 % (ref 42.7–76)
PLATELET # BLD AUTO: 157 10*3/MM3 (ref 140–450)
PMV BLD AUTO: 10.7 FL (ref 6–12)
RBC # BLD AUTO: 2.99 10*6/MM3 (ref 3.77–5.28)
WBC NRBC COR # BLD: 3.72 10*3/MM3 (ref 3.4–10.8)

## 2019-07-30 PROCEDURE — 99215 OFFICE O/P EST HI 40 MIN: CPT | Performed by: NURSE PRACTITIONER

## 2019-07-30 PROCEDURE — 96523 IRRIG DRUG DELIVERY DEVICE: CPT

## 2019-07-30 PROCEDURE — 85025 COMPLETE CBC W/AUTO DIFF WBC: CPT | Performed by: NURSE PRACTITIONER

## 2019-07-30 RX ORDER — SODIUM CHLORIDE 0.9 % (FLUSH) 0.9 %
10 SYRINGE (ML) INJECTION AS NEEDED
Status: CANCELLED | OUTPATIENT
Start: 2019-07-30

## 2019-07-30 RX ORDER — MAGNESIUM SULFATE HEPTAHYDRATE 40 MG/ML
2 INJECTION, SOLUTION INTRAVENOUS ONCE
Status: CANCELLED | OUTPATIENT
Start: 2019-08-02

## 2019-07-30 RX ORDER — SODIUM CHLORIDE 9 MG/ML
250 INJECTION, SOLUTION INTRAVENOUS ONCE
Status: CANCELLED | OUTPATIENT
Start: 2019-08-02

## 2019-07-30 RX ORDER — SODIUM CHLORIDE 0.9 % (FLUSH) 0.9 %
10 SYRINGE (ML) INJECTION AS NEEDED
Status: DISCONTINUED | OUTPATIENT
Start: 2019-07-30 | End: 2019-07-31 | Stop reason: HOSPADM

## 2019-07-30 RX ADMIN — HEPARIN 500 UNITS: 100 SYRINGE at 10:45

## 2019-07-30 RX ADMIN — Medication 30 ML: at 09:09

## 2019-07-30 NOTE — TELEPHONE ENCOUNTER
Refer to order compare CTs from [facility] (7/23/19) and [facility] (4/16/19):     Faxed note/order between Marissa and Mira Jeffrey from 7/24/19 as well as CT from [facility] to Jaky at [facility] to [phone]  States all of the information that is needed is in the patients chart so it should not take Dr. Krista bond to read.

## 2019-08-01 ENCOUNTER — HOSPITAL ENCOUNTER (OUTPATIENT)
Dept: OTHER | Facility: HOSPITAL | Age: 64
Discharge: HOME | End: 2019-08-01

## 2019-08-01 ENCOUNTER — TELEPHONE (OUTPATIENT)
Dept: ONCOLOGY | Facility: CLINIC | Age: 64
End: 2019-08-01

## 2019-08-01 DIAGNOSIS — Z00.6 EXAMINATION FOR NORMAL COMPARISON OR CONTROL IN CLINICAL RESEARCH: ICD-10-CM

## 2019-08-01 DIAGNOSIS — I82.722 CHRONIC DEEP VEIN THROMBOSIS (DVT) OF LEFT UPPER EXTREMITY, UNSPECIFIED VEIN (HCC): Primary | ICD-10-CM

## 2019-08-01 NOTE — TELEPHONE ENCOUNTER
----- Message from LEWIS Espinal sent at 7/31/2019  7:11 PM EDT -----  Regarding: CT scan comparison  Can 1 of you please call this patient tomorrow and let her know as of tonight I do not have the CAT scan comparison.  Can you also please verify that is not yet received and contact radiology?  They were told several days ago that the patient is scheduled for treatment on Friday but we did not have comparison to know if we have disease progression.  The comparison should be to the April 2019 scan at [facility] which we were informed is loaded into the system.  They had compared it to a 2017 scan.  Thanks so much explanation point

## 2019-08-01 NOTE — TELEPHONE ENCOUNTER
I spoke to Dave in the CT department about the comparison scans that were to be done. He states that he was not aware and that the radiologist should be able to do that today for her chemo tomorrow.

## 2019-08-02 ENCOUNTER — DOCUMENTATION (OUTPATIENT)
Dept: ONCOLOGY | Facility: CLINIC | Age: 64
End: 2019-08-02

## 2019-08-02 ENCOUNTER — HOSPITAL ENCOUNTER (OUTPATIENT)
Dept: ONCOLOGY | Facility: HOSPITAL | Age: 64
Setting detail: INFUSION SERIES
Discharge: HOME | End: 2019-08-02

## 2019-08-02 VITALS
TEMPERATURE: 98.3 F | HEIGHT: 65 IN | RESPIRATION RATE: 18 BRPM | BODY MASS INDEX: 30.66 KG/M2 | DIASTOLIC BLOOD PRESSURE: 68 MMHG | HEART RATE: 62 BPM | SYSTOLIC BLOOD PRESSURE: 112 MMHG | WEIGHT: 184 LBS

## 2019-08-02 DIAGNOSIS — C56.1 MALIGNANT NEOPLASM OF RIGHT OVARY (HCC): Primary | ICD-10-CM

## 2019-08-02 DIAGNOSIS — E83.42 HYPOMAGNESEMIA: ICD-10-CM

## 2019-08-02 DIAGNOSIS — G47.00 INSOMNIA, UNSPECIFIED TYPE: Primary | ICD-10-CM

## 2019-08-02 DIAGNOSIS — C56.1 MALIGNANT NEOPLASM OF RIGHT OVARY (HCC): ICD-10-CM

## 2019-08-02 LAB
ALBUMIN SERPL-MCNC: 3.5 G/DL (ref 3.5–4.8)
ALBUMIN/GLOB SERPL: 1.5 G/DL (ref 1–1.7)
ALP SERPL-CCNC: 104 U/L (ref 32–91)
ALT SERPL W P-5'-P-CCNC: 12 U/L (ref 14–54)
ANION GAP SERPL CALCULATED.3IONS-SCNC: 13.4 MMOL/L (ref 5–15)
AST SERPL-CCNC: 24 U/L (ref 15–41)
BASOPHILS # BLD AUTO: 0.03 10*3/MM3 (ref 0–0.2)
BASOPHILS NFR BLD AUTO: 0.7 % (ref 0–1.5)
BILIRUB SERPL-MCNC: 0.3 MG/DL (ref 0.3–1.2)
BUN BLD-MCNC: 14 MG/DL (ref 8–20)
BUN/CREAT SERPL: 15.6 (ref 5.4–26.2)
CALCIUM SPEC-SCNC: 8.8 MG/DL (ref 8.9–10.3)
CHLORIDE SERPL-SCNC: 106 MMOL/L (ref 101–111)
CO2 SERPL-SCNC: 23 MMOL/L (ref 22–32)
CREAT BLD-MCNC: 0.9 MG/DL (ref 0.4–1)
CREAT BLDA-MCNC: 0.8 MG/DL (ref 0.6–1.3)
DEPRECATED RDW RBC AUTO: 57 FL (ref 37–54)
EOSINOPHIL # BLD AUTO: 0.07 10*3/MM3 (ref 0–0.4)
EOSINOPHIL NFR BLD AUTO: 1.7 % (ref 0.3–6.2)
ERYTHROCYTE [DISTWIDTH] IN BLOOD BY AUTOMATED COUNT: 15.1 % (ref 12.3–15.4)
GFR SERPL CREATININE-BSD FRML MDRD: 63 ML/MIN/1.73
GLOBULIN UR ELPH-MCNC: 2.4 GM/DL (ref 2.5–3.8)
GLUCOSE BLD-MCNC: 85 MG/DL (ref 65–99)
HCT VFR BLD AUTO: 31.2 % (ref 34–46.6)
HGB BLD-MCNC: 9.8 G/DL (ref 12–15.9)
LYMPHOCYTES # BLD AUTO: 1.14 10*3/MM3 (ref 0.7–3.1)
LYMPHOCYTES NFR BLD AUTO: 27.8 % (ref 19.6–45.3)
MAGNESIUM SERPL-MCNC: 1.5 MG/DL (ref 1.8–2.5)
MCH RBC QN AUTO: 32.8 PG (ref 26.6–33)
MCHC RBC AUTO-ENTMCNC: 31.4 G/DL (ref 31.5–35.7)
MCV RBC AUTO: 104.3 FL (ref 79–97)
MONOCYTES # BLD AUTO: 0.85 10*3/MM3 (ref 0.1–0.9)
MONOCYTES NFR BLD AUTO: 20.7 % (ref 5–12)
NEUTROPHILS # BLD AUTO: 2.01 10*3/MM3 (ref 1.7–7)
NEUTROPHILS NFR BLD AUTO: 49.1 % (ref 42.7–76)
PLATELET # BLD AUTO: 175 10*3/MM3 (ref 140–450)
PMV BLD AUTO: 10.5 FL (ref 6–12)
POTASSIUM BLD-SCNC: 4.4 MMOL/L (ref 3.6–5.1)
PROT SERPL-MCNC: 5.9 G/DL (ref 6.1–7.9)
RBC # BLD AUTO: 2.99 10*6/MM3 (ref 3.77–5.28)
SODIUM BLD-SCNC: 138 MMOL/L (ref 136–144)
WBC NRBC COR # BLD: 4.1 10*3/MM3 (ref 3.4–10.8)

## 2019-08-02 PROCEDURE — 96417 CHEMO IV INFUS EACH ADDL SEQ: CPT | Performed by: NURSE PRACTITIONER

## 2019-08-02 PROCEDURE — 25010000002 FOSAPREPITANT PER 1 MG: Performed by: NURSE PRACTITIONER

## 2019-08-02 PROCEDURE — 25010000002 DEXAMETHASONE SODIUM PHOSPHATE 120 MG/30ML SOLUTION: Performed by: NURSE PRACTITIONER

## 2019-08-02 PROCEDURE — 25010000002 DOXORUBICIN LIPOSOMAL PER 10 MG: Performed by: NURSE PRACTITIONER

## 2019-08-02 PROCEDURE — 25010000002 PALONOSETRON 0.25 MG/5ML SOLUTION PREFILLED SYRINGE: Performed by: NURSE PRACTITIONER

## 2019-08-02 PROCEDURE — 25010000002 PEGFILGRASTIM 6 MG/0.6ML PREFILLED SYRINGE KIT: Performed by: NURSE PRACTITIONER

## 2019-08-02 PROCEDURE — 96367 TX/PROPH/DG ADDL SEQ IV INF: CPT | Performed by: NURSE PRACTITIONER

## 2019-08-02 PROCEDURE — 80053 COMPREHEN METABOLIC PANEL: CPT | Performed by: NURSE PRACTITIONER

## 2019-08-02 PROCEDURE — 25010000002 CARBOPLATIN PER 50 MG: Performed by: NURSE PRACTITIONER

## 2019-08-02 PROCEDURE — 82565 ASSAY OF CREATININE: CPT

## 2019-08-02 PROCEDURE — 85025 COMPLETE CBC W/AUTO DIFF WBC: CPT | Performed by: NURSE PRACTITIONER

## 2019-08-02 PROCEDURE — 83735 ASSAY OF MAGNESIUM: CPT | Performed by: INTERNAL MEDICINE

## 2019-08-02 PROCEDURE — 25010000002 MAGNESIUM SULFATE 2 GM/50ML SOLUTION: Performed by: NURSE PRACTITIONER

## 2019-08-02 PROCEDURE — 96413 CHEMO IV INFUSION 1 HR: CPT | Performed by: NURSE PRACTITIONER

## 2019-08-02 PROCEDURE — 96377 APPLICATON ON-BODY INJECTOR: CPT | Performed by: NURSE PRACTITIONER

## 2019-08-02 RX ORDER — SODIUM CHLORIDE 0.9 % (FLUSH) 0.9 %
10 SYRINGE (ML) INJECTION AS NEEDED
Status: CANCELLED | OUTPATIENT
Start: 2019-08-02

## 2019-08-02 RX ORDER — TEMAZEPAM 15 MG/1
30 CAPSULE ORAL NIGHTLY PRN
Qty: 60 CAPSULE | Refills: 1 | Status: SHIPPED | OUTPATIENT
Start: 2019-08-02 | End: 2019-08-19 | Stop reason: DRUGHIGH

## 2019-08-02 RX ORDER — DEXTROSE MONOHYDRATE 50 MG/ML
250 INJECTION, SOLUTION INTRAVENOUS ONCE
Status: CANCELLED | OUTPATIENT
Start: 2019-08-02

## 2019-08-02 RX ORDER — MAGNESIUM SULFATE HEPTAHYDRATE 40 MG/ML
2 INJECTION, SOLUTION INTRAVENOUS ONCE
Status: COMPLETED | OUTPATIENT
Start: 2019-08-02 | End: 2019-08-02

## 2019-08-02 RX ORDER — FAMOTIDINE 10 MG/ML
20 INJECTION, SOLUTION INTRAVENOUS AS NEEDED
Status: CANCELLED | OUTPATIENT
Start: 2019-08-02

## 2019-08-02 RX ORDER — DIPHENHYDRAMINE HYDROCHLORIDE 50 MG/ML
50 INJECTION INTRAMUSCULAR; INTRAVENOUS AS NEEDED
Status: CANCELLED | OUTPATIENT
Start: 2019-08-02

## 2019-08-02 RX ORDER — SODIUM CHLORIDE 0.9 % (FLUSH) 0.9 %
10 SYRINGE (ML) INJECTION AS NEEDED
Status: DISCONTINUED | OUTPATIENT
Start: 2019-08-02 | End: 2019-08-03 | Stop reason: HOSPADM

## 2019-08-02 RX ORDER — PALONOSETRON HYDROCHLORIDE 0.05 MG/ML
0.25 INJECTION, SOLUTION INTRAVENOUS ONCE
Status: COMPLETED | OUTPATIENT
Start: 2019-08-02 | End: 2019-08-02

## 2019-08-02 RX ORDER — PALONOSETRON 0.05 MG/ML
0.25 INJECTION, SOLUTION INTRAVENOUS ONCE
Status: CANCELLED | OUTPATIENT
Start: 2019-08-02

## 2019-08-02 RX ADMIN — DOXORUBICIN HYDROCHLORIDE 56 MG: 2 INJECTABLE, LIPOSOMAL INTRAVENOUS at 14:59

## 2019-08-02 RX ADMIN — PALONOSETRON 0.25 MG: 0.25 INJECTION, SOLUTION INTRAVENOUS at 12:27

## 2019-08-02 RX ADMIN — CARBOPLATIN 590 MG: 10 INJECTION, SOLUTION INTRAVENOUS at 16:02

## 2019-08-02 RX ADMIN — SODIUM CHLORIDE 100 ML: 900 INJECTION, SOLUTION INTRAVENOUS at 12:28

## 2019-08-02 RX ADMIN — PEGFILGRASTIM 6 MG: KIT SUBCUTANEOUS at 16:43

## 2019-08-02 RX ADMIN — HEPARIN 500 UNITS: 100 SYRINGE at 16:44

## 2019-08-02 RX ADMIN — MAGNESIUM SULFATE HEPTAHYDRATE 2 G: 40 INJECTION, SOLUTION INTRAVENOUS at 13:58

## 2019-08-02 RX ADMIN — DEXAMETHASONE SODIUM PHOSPHATE 8 MG: 4 INJECTION, SOLUTION INTRA-ARTICULAR; INTRALESIONAL; INTRAMUSCULAR; INTRAVENOUS; SOFT TISSUE at 13:52

## 2019-08-02 RX ADMIN — Medication 10 ML: at 16:43

## 2019-08-02 NOTE — PROGRESS NOTES
Spoke with patient and told her her CT comparison showed some improvement so we will continue the current chemotherapy.  She v/u. Electronically signed by IRENA Hoffman, 08/02/19, 9:07 AM.

## 2019-08-05 ENCOUNTER — HOSPITAL ENCOUNTER (OUTPATIENT)
Dept: ONCOLOGY | Facility: HOSPITAL | Age: 64
Discharge: HOME | End: 2019-08-05
Admitting: NURSE PRACTITIONER

## 2019-08-05 ENCOUNTER — LAB (OUTPATIENT)
Dept: LAB | Facility: HOSPITAL | Age: 64
End: 2019-08-05

## 2019-08-05 VITALS
RESPIRATION RATE: 18 BRPM | HEART RATE: 78 BPM | DIASTOLIC BLOOD PRESSURE: 72 MMHG | WEIGHT: 180 LBS | SYSTOLIC BLOOD PRESSURE: 128 MMHG | TEMPERATURE: 98 F | HEIGHT: 65 IN | BODY MASS INDEX: 29.99 KG/M2

## 2019-08-05 DIAGNOSIS — I82.722 CHRONIC DEEP VEIN THROMBOSIS (DVT) OF LEFT UPPER EXTREMITY, UNSPECIFIED VEIN (HCC): ICD-10-CM

## 2019-08-05 DIAGNOSIS — I82.722 CHRONIC DEEP VEIN THROMBOSIS (DVT) OF LEFT UPPER EXTREMITY, UNSPECIFIED VEIN (HCC): Primary | ICD-10-CM

## 2019-08-05 LAB
INR PPP: 2.9
PROTHROMBIN TIME: 32.6 SECONDS (ref 11–15)

## 2019-08-05 PROCEDURE — 85610 PROTHROMBIN TIME: CPT

## 2019-08-07 DIAGNOSIS — E83.42 HYPOMAGNESEMIA: ICD-10-CM

## 2019-08-07 RX ORDER — MAGNESIUM SULFATE HEPTAHYDRATE 40 MG/ML
2 INJECTION, SOLUTION INTRAVENOUS ONCE
Status: CANCELLED | OUTPATIENT
Start: 2019-08-23

## 2019-08-07 RX ORDER — SODIUM CHLORIDE 9 MG/ML
250 INJECTION, SOLUTION INTRAVENOUS ONCE
Status: CANCELLED | OUTPATIENT
Start: 2019-08-30

## 2019-08-07 RX ORDER — MAGNESIUM SULFATE HEPTAHYDRATE 40 MG/ML
2 INJECTION, SOLUTION INTRAVENOUS ONCE
Status: CANCELLED | OUTPATIENT
Start: 2019-08-30

## 2019-08-07 RX ORDER — MAGNESIUM SULFATE HEPTAHYDRATE 40 MG/ML
2 INJECTION, SOLUTION INTRAVENOUS ONCE
Status: CANCELLED | OUTPATIENT
Start: 2019-08-09

## 2019-08-07 RX ORDER — SODIUM CHLORIDE 9 MG/ML
250 INJECTION, SOLUTION INTRAVENOUS ONCE
Status: CANCELLED | OUTPATIENT
Start: 2019-08-16

## 2019-08-07 RX ORDER — MAGNESIUM SULFATE HEPTAHYDRATE 40 MG/ML
2 INJECTION, SOLUTION INTRAVENOUS ONCE
Status: CANCELLED | OUTPATIENT
Start: 2019-08-16

## 2019-08-07 RX ORDER — SODIUM CHLORIDE 9 MG/ML
250 INJECTION, SOLUTION INTRAVENOUS ONCE
Status: CANCELLED | OUTPATIENT
Start: 2019-08-23

## 2019-08-07 RX ORDER — SODIUM CHLORIDE 9 MG/ML
250 INJECTION, SOLUTION INTRAVENOUS ONCE
Status: CANCELLED | OUTPATIENT
Start: 2019-08-09

## 2019-08-09 ENCOUNTER — HOSPITAL ENCOUNTER (OUTPATIENT)
Dept: ONCOLOGY | Facility: HOSPITAL | Age: 64
Setting detail: INFUSION SERIES
Discharge: HOME | End: 2019-08-09

## 2019-08-09 VITALS
SYSTOLIC BLOOD PRESSURE: 114 MMHG | DIASTOLIC BLOOD PRESSURE: 70 MMHG | BODY MASS INDEX: 29.82 KG/M2 | WEIGHT: 179 LBS | HEART RATE: 78 BPM | RESPIRATION RATE: 18 BRPM | HEIGHT: 65 IN | TEMPERATURE: 98 F

## 2019-08-09 DIAGNOSIS — E83.42 HYPOMAGNESEMIA: Primary | ICD-10-CM

## 2019-08-09 DIAGNOSIS — C56.1 MALIGNANT NEOPLASM OF RIGHT OVARY (HCC): ICD-10-CM

## 2019-08-09 LAB
BASOPHILS # BLD AUTO: 0.01 10*3/MM3 (ref 0–0.2)
BASOPHILS NFR BLD AUTO: 0.1 % (ref 0–1.5)
DEPRECATED RDW RBC AUTO: 56.1 FL (ref 37–54)
EOSINOPHIL # BLD AUTO: 0.04 10*3/MM3 (ref 0–0.4)
EOSINOPHIL NFR BLD AUTO: 0.4 % (ref 0.3–6.2)
ERYTHROCYTE [DISTWIDTH] IN BLOOD BY AUTOMATED COUNT: 15 % (ref 12.3–15.4)
HCT VFR BLD AUTO: 33.2 % (ref 34–46.6)
HGB BLD-MCNC: 10.8 G/DL (ref 12–15.9)
LYMPHOCYTES # BLD AUTO: 1.13 10*3/MM3 (ref 0.7–3.1)
LYMPHOCYTES NFR BLD AUTO: 12.2 % (ref 19.6–45.3)
MAGNESIUM SERPL-MCNC: 1.5 MG/DL (ref 1.8–2.5)
MCH RBC QN AUTO: 34.2 PG (ref 26.6–33)
MCHC RBC AUTO-ENTMCNC: 32.5 G/DL (ref 31.5–35.7)
MCV RBC AUTO: 105.1 FL (ref 79–97)
MONOCYTES # BLD AUTO: 0.73 10*3/MM3 (ref 0.1–0.9)
MONOCYTES NFR BLD AUTO: 7.9 % (ref 5–12)
NEUTROPHILS # BLD AUTO: 7.33 10*3/MM3 (ref 1.7–7)
NEUTROPHILS NFR BLD AUTO: 79.4 % (ref 42.7–76)
PLATELET # BLD AUTO: 112 10*3/MM3 (ref 140–450)
PMV BLD AUTO: 11.6 FL (ref 6–12)
RBC # BLD AUTO: 3.16 10*6/MM3 (ref 3.77–5.28)
WBC NRBC COR # BLD: 9.24 10*3/MM3 (ref 3.4–10.8)

## 2019-08-09 PROCEDURE — 25010000002 MAGNESIUM SULFATE 2 GM/50ML SOLUTION: Performed by: NURSE PRACTITIONER

## 2019-08-09 PROCEDURE — 83735 ASSAY OF MAGNESIUM: CPT | Performed by: NURSE PRACTITIONER

## 2019-08-09 PROCEDURE — 96365 THER/PROPH/DIAG IV INF INIT: CPT | Performed by: INTERNAL MEDICINE

## 2019-08-09 PROCEDURE — 85025 COMPLETE CBC W/AUTO DIFF WBC: CPT | Performed by: NURSE PRACTITIONER

## 2019-08-09 RX ORDER — FAMOTIDINE 40 MG/1
TABLET, FILM COATED ORAL
Qty: 90 TABLET | Refills: 1 | Status: SHIPPED | OUTPATIENT
Start: 2019-08-09 | End: 2019-12-08

## 2019-08-09 RX ORDER — WARFARIN SODIUM 1 MG/1
6 TABLET ORAL DAILY
Refills: 3 | COMMUNITY
Start: 2019-07-30 | End: 2019-09-13 | Stop reason: SDUPTHER

## 2019-08-09 RX ORDER — SODIUM CHLORIDE 0.9 % (FLUSH) 0.9 %
10 SYRINGE (ML) INJECTION AS NEEDED
Status: DISCONTINUED | OUTPATIENT
Start: 2019-08-09 | End: 2019-08-10 | Stop reason: HOSPADM

## 2019-08-09 RX ORDER — SODIUM CHLORIDE 0.9 % (FLUSH) 0.9 %
10 SYRINGE (ML) INJECTION AS NEEDED
Status: CANCELLED | OUTPATIENT
Start: 2019-08-09

## 2019-08-09 RX ORDER — MAGNESIUM SULFATE HEPTAHYDRATE 40 MG/ML
2 INJECTION, SOLUTION INTRAVENOUS ONCE
Status: COMPLETED | OUTPATIENT
Start: 2019-08-09 | End: 2019-08-09

## 2019-08-09 RX ADMIN — Medication 10 ML: at 12:29

## 2019-08-09 RX ADMIN — HEPARIN 500 UNITS: 100 SYRINGE at 12:30

## 2019-08-09 RX ADMIN — MAGNESIUM SULFATE HEPTAHYDRATE 2 G: 40 INJECTION, SOLUTION INTRAVENOUS at 11:23

## 2019-08-13 ENCOUNTER — OFFICE VISIT (OUTPATIENT)
Dept: PAIN MEDICINE | Facility: CLINIC | Age: 64
End: 2019-08-13

## 2019-08-13 VITALS
WEIGHT: 185 LBS | HEIGHT: 65 IN | RESPIRATION RATE: 16 BRPM | DIASTOLIC BLOOD PRESSURE: 79 MMHG | BODY MASS INDEX: 30.82 KG/M2 | SYSTOLIC BLOOD PRESSURE: 135 MMHG | HEART RATE: 55 BPM | OXYGEN SATURATION: 99 % | TEMPERATURE: 98.3 F

## 2019-08-13 DIAGNOSIS — M79.604 LEG PAIN, BILATERAL: Primary | ICD-10-CM

## 2019-08-13 DIAGNOSIS — Z79.899 OTHER LONG TERM (CURRENT) DRUG THERAPY: ICD-10-CM

## 2019-08-13 DIAGNOSIS — M79.605 LEG PAIN, BILATERAL: Primary | ICD-10-CM

## 2019-08-13 DIAGNOSIS — R52 PAIN: ICD-10-CM

## 2019-08-13 DIAGNOSIS — M47.812 OSTEOARTHRITIS OF CERVICAL SPINE WITHOUT MYELOPATHY: ICD-10-CM

## 2019-08-13 PROCEDURE — 99213 OFFICE O/P EST LOW 20 MIN: CPT | Performed by: PHYSICAL MEDICINE & REHABILITATION

## 2019-08-13 PROCEDURE — G0463 HOSPITAL OUTPT CLINIC VISIT: HCPCS | Performed by: PHYSICAL MEDICINE & REHABILITATION

## 2019-08-13 RX ORDER — OXYCODONE HYDROCHLORIDE 10 MG/1
10 TABLET ORAL 3 TIMES DAILY PRN
Qty: 90 TABLET | Refills: 0 | Status: SHIPPED | OUTPATIENT
Start: 2019-08-13 | End: 2019-10-07 | Stop reason: SDUPTHER

## 2019-08-13 RX ORDER — OXYCODONE HYDROCHLORIDE 10 MG/1
10 TABLET ORAL 3 TIMES DAILY PRN
Qty: 90 TABLET | Refills: 0 | Status: SHIPPED | OUTPATIENT
Start: 2019-08-13 | End: 2019-08-13 | Stop reason: SDUPTHER

## 2019-08-13 NOTE — PROGRESS NOTES
"Subjective   Tahira Zimmerman is a 64 y.o. female.     neck and shoulder pain, all over aching \"bone\" due to chemo--also left rotator cuff tear  also broke out with shingles-under breast on back and on head. 9/10 at worst, 2/10 at best, 6/10 today. Nonradiating. Always present, varies, interferes with daily activities. Imaging shows metastatic disease. Lengthy prior notes reviwed, treated for metastatic disease, failed Tramadol, cannot tolerate Hydrocodone, could tolerate Oxycodone 5mg, taking BID, also Lyrica 75mg BID. Started new chemo, worsening pain in b/l hips and legs, especially at night. Now taking Oxycodone 10mg TID prn and Lyrica 100mg BID with good relief. Started another new chemo with worsening pain, on a break while insurance approves pill form with improved pain. New chemo pill lowered Mg, holding it while getting Mg infusions. Shingles outbreak. Started new chemo, has aches controlled by current meds.         The following portions of the patient's history were reviewed and updated as appropriate: allergies, current medications, past family history, past medical history, past social history, past surgical history and problem list.    Review of Systems   Constitutional: Negative for chills, fatigue and fever.   HENT: Positive for hearing loss. Negative for trouble swallowing.    Eyes: Negative for visual disturbance.   Respiratory: Negative for shortness of breath.    Cardiovascular: Negative for chest pain.   Gastrointestinal: Positive for abdominal pain and nausea. Negative for constipation, diarrhea and vomiting.   Genitourinary: Negative for urinary incontinence.   Musculoskeletal: Positive for arthralgias, back pain and neck pain. Negative for joint swelling and myalgias.   Neurological: Positive for headache. Negative for dizziness, weakness and numbness.       Objective   Physical Exam   Constitutional: She is oriented to person, place, and time. She appears well-developed and " well-nourished.   HENT:   Head: Normocephalic and atraumatic.   Eyes: EOM are normal. Pupils are equal, round, and reactive to light.   Neck: Normal range of motion.   Cardiovascular: Normal rate, regular rhythm, normal heart sounds and intact distal pulses.   Pulmonary/Chest: Breath sounds normal.   Abdominal: Soft. Bowel sounds are normal. She exhibits no distension. There is no tenderness.   Neurological: She is alert and oriented to person, place, and time. She has normal strength and normal reflexes. She displays normal reflexes. A sensory deficit is present.   Decreased in b/l stocking distribution   Psychiatric: She has a normal mood and affect. Her behavior is normal. Thought content normal.         Assessment/Plan   Tahira was seen today for back pain.    Diagnoses and all orders for this visit:    Leg pain, bilateral    Pain    Osteoarthritis of cervical spine without myelopathy    Other long term (current) drug therapy        INspect reviewed, in order. Low risk. Repeat drug screen 2/14/19 in order.  Cont Oxycodone 10mg 1/2 - 1 tab TID prn, cannot tolerate Hydrocodone, failed Tramadol. May need to increase in future, possibly before next visit with starting IV chemo.  Increased to Lyrica 100mg TID for worsening pain. More effective than Gabapentin, already taking antidepressants so no plans for beginning Cymbalta.  Cont RxAlt shingles cream prn.  Cont other meds as prescribed.  No plans for procedures or TENS with metastatic disease.  RTC 2 months for f/u.

## 2019-08-16 ENCOUNTER — HOSPITAL ENCOUNTER (OUTPATIENT)
Dept: ONCOLOGY | Facility: HOSPITAL | Age: 64
Discharge: HOME | End: 2019-08-16
Admitting: NURSE PRACTITIONER

## 2019-08-16 ENCOUNTER — TELEPHONE (OUTPATIENT)
Dept: PAIN MEDICINE | Facility: HOSPITAL | Age: 64
End: 2019-08-16

## 2019-08-16 ENCOUNTER — LAB (OUTPATIENT)
Dept: LAB | Facility: HOSPITAL | Age: 64
End: 2019-08-16

## 2019-08-16 ENCOUNTER — HOSPITAL ENCOUNTER (OUTPATIENT)
Dept: ONCOLOGY | Facility: HOSPITAL | Age: 64
Setting detail: INFUSION SERIES
Discharge: HOME | End: 2019-08-16

## 2019-08-16 VITALS
BODY MASS INDEX: 30.16 KG/M2 | DIASTOLIC BLOOD PRESSURE: 68 MMHG | WEIGHT: 181 LBS | HEIGHT: 65 IN | HEART RATE: 51 BPM | RESPIRATION RATE: 18 BRPM | SYSTOLIC BLOOD PRESSURE: 115 MMHG | TEMPERATURE: 98.4 F

## 2019-08-16 DIAGNOSIS — E83.42 HYPOMAGNESEMIA: Primary | ICD-10-CM

## 2019-08-16 DIAGNOSIS — C56.1 MALIGNANT NEOPLASM OF RIGHT OVARY (HCC): ICD-10-CM

## 2019-08-16 DIAGNOSIS — I82.722 CHRONIC DEEP VEIN THROMBOSIS (DVT) OF LEFT UPPER EXTREMITY, UNSPECIFIED VEIN (HCC): ICD-10-CM

## 2019-08-16 DIAGNOSIS — I82.722 CHRONIC DEEP VEIN THROMBOSIS (DVT) OF LEFT UPPER EXTREMITY, UNSPECIFIED VEIN (HCC): Primary | ICD-10-CM

## 2019-08-16 LAB
BASOPHILS # BLD AUTO: 0.01 10*3/MM3 (ref 0–0.2)
BASOPHILS NFR BLD AUTO: 0.3 % (ref 0–1.5)
DEPRECATED RDW RBC AUTO: 50.6 FL (ref 37–54)
EOSINOPHIL # BLD AUTO: 0.08 10*3/MM3 (ref 0–0.4)
EOSINOPHIL NFR BLD AUTO: 2.3 % (ref 0.3–6.2)
ERYTHROCYTE [DISTWIDTH] IN BLOOD BY AUTOMATED COUNT: 14 % (ref 12.3–15.4)
HCT VFR BLD AUTO: 27.7 % (ref 34–46.6)
HGB BLD-MCNC: 8.9 G/DL (ref 12–15.9)
INR PPP: 3
LYMPHOCYTES # BLD AUTO: 1.2 10*3/MM3 (ref 0.7–3.1)
LYMPHOCYTES NFR BLD AUTO: 33.9 % (ref 19.6–45.3)
MAGNESIUM SERPL-MCNC: 1.3 MG/DL (ref 1.8–2.5)
MCH RBC QN AUTO: 33.1 PG (ref 26.6–33)
MCHC RBC AUTO-ENTMCNC: 32.1 G/DL (ref 31.5–35.7)
MCV RBC AUTO: 103 FL (ref 79–97)
MONOCYTES # BLD AUTO: 0.29 10*3/MM3 (ref 0.1–0.9)
MONOCYTES NFR BLD AUTO: 8.2 % (ref 5–12)
NEUTROPHILS # BLD AUTO: 1.96 10*3/MM3 (ref 1.7–7)
NEUTROPHILS NFR BLD AUTO: 55.3 % (ref 42.7–76)
PLATELET # BLD AUTO: 44 10*3/MM3 (ref 140–450)
PMV BLD AUTO: 11.8 FL (ref 6–12)
PROTHROMBIN TIME: 37.3 SECONDS (ref 11–15)
RBC # BLD AUTO: 2.69 10*6/MM3 (ref 3.77–5.28)
WBC NRBC COR # BLD: 3.54 10*3/MM3 (ref 3.4–10.8)

## 2019-08-16 PROCEDURE — 25010000002 MAGNESIUM SULFATE 2 GM/50ML SOLUTION: Performed by: NURSE PRACTITIONER

## 2019-08-16 PROCEDURE — 85025 COMPLETE CBC W/AUTO DIFF WBC: CPT | Performed by: NURSE PRACTITIONER

## 2019-08-16 PROCEDURE — 96365 THER/PROPH/DIAG IV INF INIT: CPT | Performed by: INTERNAL MEDICINE

## 2019-08-16 PROCEDURE — 85610 PROTHROMBIN TIME: CPT

## 2019-08-16 PROCEDURE — 83735 ASSAY OF MAGNESIUM: CPT | Performed by: NURSE PRACTITIONER

## 2019-08-16 RX ORDER — SODIUM CHLORIDE 0.9 % (FLUSH) 0.9 %
10 SYRINGE (ML) INJECTION AS NEEDED
Status: DISCONTINUED | OUTPATIENT
Start: 2019-08-16 | End: 2019-08-17 | Stop reason: HOSPADM

## 2019-08-16 RX ORDER — SODIUM CHLORIDE 9 MG/ML
250 INJECTION, SOLUTION INTRAVENOUS ONCE
Status: DISCONTINUED | OUTPATIENT
Start: 2019-08-16 | End: 2019-08-17 | Stop reason: HOSPADM

## 2019-08-16 RX ORDER — MAGNESIUM SULFATE HEPTAHYDRATE 40 MG/ML
2 INJECTION, SOLUTION INTRAVENOUS ONCE
Status: COMPLETED | OUTPATIENT
Start: 2019-08-16 | End: 2019-08-16

## 2019-08-16 RX ORDER — SODIUM CHLORIDE 0.9 % (FLUSH) 0.9 %
10 SYRINGE (ML) INJECTION AS NEEDED
Status: CANCELLED | OUTPATIENT
Start: 2019-08-16

## 2019-08-16 RX ADMIN — MAGNESIUM SULFATE HEPTAHYDRATE 2 G: 40 INJECTION, SOLUTION INTRAVENOUS at 10:41

## 2019-08-16 RX ADMIN — Medication 10 ML: at 11:42

## 2019-08-16 RX ADMIN — HEPARIN 500 UNITS: 100 SYRINGE at 11:42

## 2019-08-19 ENCOUNTER — PATIENT MESSAGE (OUTPATIENT)
Dept: FAMILY MEDICINE CLINIC | Facility: CLINIC | Age: 64
End: 2019-08-19

## 2019-08-19 ENCOUNTER — TELEPHONE (OUTPATIENT)
Dept: PAIN MEDICINE | Facility: HOSPITAL | Age: 64
End: 2019-08-19

## 2019-08-19 ENCOUNTER — TELEPHONE (OUTPATIENT)
Dept: ONCOLOGY | Facility: CLINIC | Age: 64
End: 2019-08-19

## 2019-08-19 RX ORDER — TEMAZEPAM 30 MG/1
30 CAPSULE ORAL NIGHTLY PRN
Qty: 30 CAPSULE | Refills: 0 | Status: SHIPPED | OUTPATIENT
Start: 2019-08-19 | End: 2019-08-22

## 2019-08-20 ENCOUNTER — LAB (OUTPATIENT)
Dept: LAB | Facility: HOSPITAL | Age: 64
End: 2019-08-20

## 2019-08-20 DIAGNOSIS — C56.1 MALIGNANT NEOPLASM OF RIGHT OVARY (HCC): ICD-10-CM

## 2019-08-20 LAB
BASOPHILS # BLD AUTO: 0.01 10*3/MM3 (ref 0–0.2)
BASOPHILS NFR BLD AUTO: 0.3 % (ref 0–1.5)
DEPRECATED RDW RBC AUTO: 53.7 FL (ref 37–54)
EOSINOPHIL # BLD AUTO: 0.15 10*3/MM3 (ref 0–0.4)
EOSINOPHIL NFR BLD AUTO: 5.1 % (ref 0.3–6.2)
ERYTHROCYTE [DISTWIDTH] IN BLOOD BY AUTOMATED COUNT: 14.7 % (ref 12.3–15.4)
HCT VFR BLD AUTO: 29.2 % (ref 34–46.6)
HGB BLD-MCNC: 9.5 G/DL (ref 12–15.9)
LYMPHOCYTES # BLD AUTO: 1.13 10*3/MM3 (ref 0.7–3.1)
LYMPHOCYTES NFR BLD AUTO: 38 % (ref 19.6–45.3)
MCH RBC QN AUTO: 34.1 PG (ref 26.6–33)
MCHC RBC AUTO-ENTMCNC: 32.5 G/DL (ref 31.5–35.7)
MCV RBC AUTO: 104.7 FL (ref 79–97)
MONOCYTES # BLD AUTO: 0.56 10*3/MM3 (ref 0.1–0.9)
MONOCYTES NFR BLD AUTO: 18.9 % (ref 5–12)
NEUTROPHILS # BLD AUTO: 1.12 10*3/MM3 (ref 1.7–7)
NEUTROPHILS NFR BLD AUTO: 37.7 % (ref 42.7–76)
PLATELET # BLD AUTO: 48 10*3/MM3 (ref 140–450)
PMV BLD AUTO: 11.7 FL (ref 6–12)
RBC # BLD AUTO: 2.79 10*6/MM3 (ref 3.77–5.28)
WBC NRBC COR # BLD: 2.97 10*3/MM3 (ref 3.4–10.8)

## 2019-08-20 PROCEDURE — 36415 COLL VENOUS BLD VENIPUNCTURE: CPT | Performed by: NURSE PRACTITIONER

## 2019-08-20 PROCEDURE — 85025 COMPLETE CBC W/AUTO DIFF WBC: CPT | Performed by: NURSE PRACTITIONER

## 2019-08-21 RX ORDER — TEMAZEPAM 15 MG/1
CAPSULE ORAL
Qty: 30 CAPSULE | Refills: 0 | OUTPATIENT
Start: 2019-08-21

## 2019-08-22 ENCOUNTER — TELEPHONE (OUTPATIENT)
Dept: ONCOLOGY | Facility: CLINIC | Age: 64
End: 2019-08-22

## 2019-08-22 DIAGNOSIS — G25.81 RESTLESS LEG: Primary | ICD-10-CM

## 2019-08-22 RX ORDER — TEMAZEPAM 15 MG/1
15 CAPSULE ORAL NIGHTLY PRN
Qty: 30 CAPSULE | Refills: 1 | Status: CANCELLED | OUTPATIENT
Start: 2019-08-22

## 2019-08-22 NOTE — TELEPHONE ENCOUNTER
[facility] pharmacy called stating that pts insurance would only cover Temazepam once daily and needs a new prescription sent over.  I spoke with pt and she states that Temazepam 15mg po once a day worked fine for her.

## 2019-08-23 ENCOUNTER — HOSPITAL ENCOUNTER (OUTPATIENT)
Dept: ONCOLOGY | Facility: HOSPITAL | Age: 64
Setting detail: INFUSION SERIES
Discharge: HOME | End: 2019-08-23

## 2019-08-23 VITALS
HEART RATE: 69 BPM | SYSTOLIC BLOOD PRESSURE: 143 MMHG | BODY MASS INDEX: 29.82 KG/M2 | HEIGHT: 65 IN | TEMPERATURE: 98.4 F | WEIGHT: 179 LBS | DIASTOLIC BLOOD PRESSURE: 74 MMHG | RESPIRATION RATE: 18 BRPM

## 2019-08-23 DIAGNOSIS — C56.1 MALIGNANT NEOPLASM OF RIGHT OVARY (HCC): ICD-10-CM

## 2019-08-23 DIAGNOSIS — E83.42 HYPOMAGNESEMIA: Primary | ICD-10-CM

## 2019-08-23 LAB
BASOPHILS # BLD AUTO: 0.02 10*3/MM3 (ref 0–0.2)
BASOPHILS NFR BLD AUTO: 0.6 % (ref 0–1.5)
DEPRECATED RDW RBC AUTO: 55.8 FL (ref 37–54)
EOSINOPHIL # BLD AUTO: 0.1 10*3/MM3 (ref 0–0.4)
EOSINOPHIL NFR BLD AUTO: 3.1 % (ref 0.3–6.2)
ERYTHROCYTE [DISTWIDTH] IN BLOOD BY AUTOMATED COUNT: 15 % (ref 12.3–15.4)
HCT VFR BLD AUTO: 28.6 % (ref 34–46.6)
HGB BLD-MCNC: 9.2 G/DL (ref 12–15.9)
LYMPHOCYTES # BLD AUTO: 1.11 10*3/MM3 (ref 0.7–3.1)
LYMPHOCYTES NFR BLD AUTO: 34.7 % (ref 19.6–45.3)
MAGNESIUM SERPL-MCNC: 1.4 MG/DL (ref 1.8–2.5)
MCH RBC QN AUTO: 33.8 PG (ref 26.6–33)
MCHC RBC AUTO-ENTMCNC: 32.2 G/DL (ref 31.5–35.7)
MCV RBC AUTO: 105.1 FL (ref 79–97)
MONOCYTES # BLD AUTO: 0.47 10*3/MM3 (ref 0.1–0.9)
MONOCYTES NFR BLD AUTO: 14.7 % (ref 5–12)
NEUTROPHILS # BLD AUTO: 1.5 10*3/MM3 (ref 1.7–7)
NEUTROPHILS NFR BLD AUTO: 46.9 % (ref 42.7–76)
PLATELET # BLD AUTO: 81 10*3/MM3 (ref 140–450)
PMV BLD AUTO: 10.8 FL (ref 6–12)
RBC # BLD AUTO: 2.72 10*6/MM3 (ref 3.77–5.28)
WBC NRBC COR # BLD: 3.2 10*3/MM3 (ref 3.4–10.8)

## 2019-08-23 PROCEDURE — 85025 COMPLETE CBC W/AUTO DIFF WBC: CPT | Performed by: NURSE PRACTITIONER

## 2019-08-23 PROCEDURE — 96365 THER/PROPH/DIAG IV INF INIT: CPT | Performed by: NURSE PRACTITIONER

## 2019-08-23 PROCEDURE — 25010000002 MAGNESIUM SULFATE 2 GM/50ML SOLUTION: Performed by: NURSE PRACTITIONER

## 2019-08-23 PROCEDURE — 83735 ASSAY OF MAGNESIUM: CPT | Performed by: NURSE PRACTITIONER

## 2019-08-23 RX ORDER — MAGNESIUM SULFATE HEPTAHYDRATE 40 MG/ML
2 INJECTION, SOLUTION INTRAVENOUS ONCE
Status: COMPLETED | OUTPATIENT
Start: 2019-08-23 | End: 2019-08-23

## 2019-08-23 RX ORDER — SODIUM CHLORIDE 0.9 % (FLUSH) 0.9 %
10 SYRINGE (ML) INJECTION AS NEEDED
Status: DISCONTINUED | OUTPATIENT
Start: 2019-08-23 | End: 2019-08-24 | Stop reason: HOSPADM

## 2019-08-23 RX ORDER — TEMAZEPAM 15 MG/1
CAPSULE ORAL
Qty: 30 CAPSULE | Refills: 0 | Status: SHIPPED | OUTPATIENT
Start: 2019-08-23 | End: 2019-08-26 | Stop reason: SDUPTHER

## 2019-08-23 RX ORDER — SODIUM CHLORIDE 0.9 % (FLUSH) 0.9 %
10 SYRINGE (ML) INJECTION AS NEEDED
Status: CANCELLED | OUTPATIENT
Start: 2019-08-23

## 2019-08-23 RX ADMIN — SODIUM CHLORIDE, PRESERVATIVE FREE 10 ML: 5 INJECTION INTRAVENOUS at 12:07

## 2019-08-23 RX ADMIN — MAGNESIUM SULFATE HEPTAHYDRATE 2 G: 40 INJECTION, SOLUTION INTRAVENOUS at 11:08

## 2019-08-26 ENCOUNTER — OFFICE VISIT (OUTPATIENT)
Dept: FAMILY MEDICINE CLINIC | Facility: CLINIC | Age: 64
End: 2019-08-26

## 2019-08-26 VITALS
TEMPERATURE: 98 F | SYSTOLIC BLOOD PRESSURE: 139 MMHG | DIASTOLIC BLOOD PRESSURE: 80 MMHG | BODY MASS INDEX: 29.62 KG/M2 | OXYGEN SATURATION: 99 % | WEIGHT: 178 LBS | HEART RATE: 55 BPM

## 2019-08-26 DIAGNOSIS — I10 HYPERTENSION, UNSPECIFIED TYPE: ICD-10-CM

## 2019-08-26 DIAGNOSIS — E78.5 HYPERLIPIDEMIA, UNSPECIFIED HYPERLIPIDEMIA TYPE: Primary | ICD-10-CM

## 2019-08-26 LAB
ALBUMIN SERPL-MCNC: 3.9 G/DL (ref 3.5–4.8)
ALBUMIN/GLOB SERPL: 1.6 G/DL (ref 1–1.7)
ALP SERPL-CCNC: 108 U/L (ref 32–91)
ALT SERPL W P-5'-P-CCNC: 13 U/L (ref 14–54)
ANION GAP SERPL CALCULATED.3IONS-SCNC: 18.3 MMOL/L (ref 5–15)
ARTICHOKE IGE QN: 154 MG/DL (ref 0–100)
AST SERPL-CCNC: 24 U/L (ref 15–41)
BILIRUB SERPL-MCNC: 0.6 MG/DL (ref 0.3–1.2)
BUN BLD-MCNC: 18 MG/DL (ref 8–20)
BUN/CREAT SERPL: 18 (ref 5.4–26.2)
CALCIUM SPEC-SCNC: 9.3 MG/DL (ref 8.9–10.3)
CHLORIDE SERPL-SCNC: 102 MMOL/L (ref 101–111)
CHOLEST SERPL-MCNC: 222 MG/DL
CO2 SERPL-SCNC: 25 MMOL/L (ref 22–32)
CREAT BLD-MCNC: 1 MG/DL (ref 0.4–1)
GFR SERPL CREATININE-BSD FRML MDRD: 56 ML/MIN/1.73
GLOBULIN UR ELPH-MCNC: 2.4 GM/DL (ref 2.5–3.8)
GLUCOSE BLD-MCNC: 79 MG/DL (ref 65–99)
HDLC SERPL QL: 3.83
HDLC SERPL-MCNC: 58 MG/DL
LDLC/HDLC SERPL: 2.51 {RATIO}
POTASSIUM BLD-SCNC: 4.3 MMOL/L (ref 3.6–5.1)
PROT SERPL-MCNC: 6.3 G/DL (ref 6.1–7.9)
SODIUM BLD-SCNC: 141 MMOL/L (ref 136–144)
TRIGL SERPL-MCNC: 92 MG/DL
VLDLC SERPL-MCNC: 18.4 MG/DL

## 2019-08-26 PROCEDURE — 99213 OFFICE O/P EST LOW 20 MIN: CPT | Performed by: FAMILY MEDICINE

## 2019-08-26 PROCEDURE — 80061 LIPID PANEL: CPT | Performed by: FAMILY MEDICINE

## 2019-08-26 PROCEDURE — 80053 COMPREHEN METABOLIC PANEL: CPT | Performed by: FAMILY MEDICINE

## 2019-08-26 PROCEDURE — 36415 COLL VENOUS BLD VENIPUNCTURE: CPT | Performed by: FAMILY MEDICINE

## 2019-08-26 RX ORDER — TEMAZEPAM 15 MG/1
15 CAPSULE ORAL NIGHTLY PRN
Qty: 30 CAPSULE | Refills: 2 | Status: SHIPPED | OUTPATIENT
Start: 2019-08-26 | End: 2019-08-29

## 2019-08-26 NOTE — PROGRESS NOTES
Hematology/Oncology Outpatient Follow Up    PATIENT NAME:Tahira Zimmerman  :1955  MRN: [id number]  PRIMARY CARE PHYSICIAN: Noemy Queen MD  REFERRING PHYSICIAN: Noemy Queen MD    Chief Complaint   Patient presents with   • Follow-up     Recurrent ovarian cancer       HISTORY OF PRESENT ILLNESS:   1. Recurrent ovarian cancer diagnosis established in 2013.  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  The patient was treated with surgery and then postoperatively with adjuvant chemotherapy consisting of Carboplatin and Taxol.  Six months later, she had recurrence of disease intra-abdominally between the rectum and vagina, which was treated with intraabdominal peritoneal chemotherapy.  She had been free of disease since that time.  She reported feeling a mass in the left side of her abdomen approximately six months ago.  This gradually grew and in early 2013 she had her daughter feel the mass.  The daughter works for Dr. David Rogers and the patient at that time also complained of intermittent rectal bleeding.  Consultation with Dr. David Rogers was obtained.  The patient had a CT scan of the abdomen and pelvis performed on 13 revealing left lower quadrant mass measuring 9.7 x 9.3 x 6 cm demonstrating areas of dense calcification, soft tissue density and cystic density within it.  Stromal tumor is felt to be most likely a possibly fibroma.  It is generated with necrosis and calcification.  Possible palpable mass in the rectus muscle is seen with calcification and deformity of the rectus muscle and just below this a second mass intra-abdominally was seen measuring 2 cm in the greatest diameter suspicious for second intraabdominal tumor.  The mass separate from the largest mass measuring 2.6 cm in the dependent portion of pelvis is seen on the right of the midline.  No retroperitoneal  lymphadenopathy was seen.  No free air, free fluid or other abnormality was present.  The patient was scheduled for an outpatient colonoscopy, but had syncopal episode while sitting in the toilet the night before and was brought to the emergency room early in the morning by her daughter and son-in-law.  The patient had apparently stopped breathing for a few seconds and did not have a pulse, but started breathing before CPR could be initiated.  In the emergency room, the patient had an EKG revealing sinus rhythm nonspecific T-wave flattening; metabolic panel revealed a glucose of 148, creatinine of 1.3, CBC was normal.  She was treated with intravenous fluid.  The patient’s case was then discussed with Dr. Anabell Monique and patient was subsequently admitted to [facility].  The patient underwent a colonoscopy by Dr. David Rogers on 06/27/13 revealing sigmoid diverticulosis and grade II internal and external hemorrhoids, but no mass or colitis was seen.  CT-guided biopsy of the mass was performed on 06/27/13 as well revealing metastatic papillary adenocarcinoma with numerous psammoma bodies.  Pathology comment stated prior records were not available; however, with history of ovarian cancer the current biopsy would be consistent with metastases from that malignancy.  Oncology consult was obtained and I initially saw the patient on 06/27/2013 where the patient had claimed to have little bit of pain in the area of disease.  She reported being frequently constipated, denies any weight loss or fevers.  She did report having some night sweats, but thought that was because of her menopause.  On 06/27/13 metastatic workup including labs and CT scans were initiated.  CT scan of the chest on 06/27/13 revealed no acute disease in the chest.  A 1.4 cm size focal area of decreased density was noted in the lateral aspect of the right lobe of the liver consistent with a benign cyst or hemangioma.  There was a very  small hiatal hernia measuring 2.2 cm in diameter.  A CT scan of the head performed 06/27/13 revealed no acute intracranial abnormality noted.  CA-125 was 40 units/ml (0-35), CA 19-9 was 11.8 units/ml (0-35), CEA level was 0.8 ng/ml (0-3), TSH level was 1.09 IU/ml (0.34-5.6).  The patient was in workup for the syncopal episode, a carotid Doppler was performed on June 28 revealing an essentially normal study showing a reduction in diameter from 0-15% bilaterally.  A Holter monitor was completed on 06/27/13, which was unremarkable except for a few premature atrial contractions.  The patient was felt stable and subsequently was discharged home on 06/28/13.  Post discharge, the patient was seen at [facility] on 07/05/13 by Dr. Kathryn Thrasher who discussed treatment options with the patient including surgery.  The patient is in the office today 07/16/13 in follow up post hospitalization.  She is reporting that surgery is planned for July 30th of this month at .  · 7/30/13 to 8/3/13 - The patient was in [facility].  The patient was admitted for surgery for her recurrent ovarian cancer.  The patient had exploratory laparotomy, omentectomy, bowel resection, liver biopsy and appendectomy.  Pathology revealed of the omentum metastatic serous carcinoma of the transverse colon, segmental resection showed metastatic serous carcinoma involving mesenteric fat.  The liver wedge resection showed fibrosclerotic thickening of the hepatic capsule.  Benign liver parenchyma.  No evidence of metastatic tumor.  Appendix showed fibrous obliteration of the lumen.  Negative for metastatic carcinoma.  Rectum and sigmoid colon segmental resection showed metastatic serous carcinoma invading the colorectal mucosa uninvolved by tumor.  Vaginal mass showed metastatic serous carcinoma.  Margins are positive for tumor.  · 9/24/13 - Chemotherapy orders were written for patient to start carboplatin 550  mg IV day one and Taxol 330 mg IV day one to be cycled every three weeks.  · 10/10/13 - The patient started cycle #1 of chemotherapy consisting of Carboplatin and Taxol.  · 09/25/13 -  is 10.6 normal.  · 10/01/13 - CT of the chest, abdomen and pelvis revealed new reticular nodular occupancy in the anterior segment of the right upper lobe, could reflect an inflammation or infectious etiology or could reflect unusual persistence of metastatic tumor.  New liver lesions, the smaller one appears to be implant on the surface of the liver, the larger may also represent an implant including the intrahepatic.  Several small mesenteric nodes seen throughout the abdomen and pelvis.  · 10/02/13 - Port placed by Dr. Sen.  · 10/24/13 - Orders written to start Neupogen daily and if more than three Neupogen are needed to give Neulasta after next chemo.  ANC is 0.15.  · 10/31/14 - Patient given cycle 2 of chemotherapy with Taxol and Carboplatin.  · 11/21/13 - The patient started cycle 3 of chemotherapy consisting of Carboplatin and Taxol.  · 11/26/13 - CT scan of chest, abdomen and pelvis revealed no evidence of active disease in the chest.  Reticulonodular opacity has resolved.  Metastatic lesion impressing upon the hepatic dome similar to prior exam.  No evidence of a change.  No evidence of new disease.  Postsurgical changes in the pelvis and throughout the retroperitoneum in the large bowel.  Finding containing anterior abdominal wall hernia containing fat tissue and enhancing vessels, 3 cm in diameter.  · 12/2/13 - Orders written to start Neupogen per protocol as well as Aranesp due to low hemoglobin and ANC.  ANC is 0.14.  Hemoglobin is 9.7.  · 12/11/13 - Orders written to hold chemotherapy until next week and decrease dose by 20% due to low platelet count.  Platelet count is 85,000.  · 12/16/13 - The patient received cycle 4 of Carboplatin and Taxol at a 20% dose reduction so Taxol dose is 260 mg and Carboplatin is 440  mg.  · 12/16/13 - CA-125 12.6 (N).  · 12/19/13 - PET/CT scan.  Impression:  1. There is no evidence of hypermetabolism in the liver.  Specifically of the dominant lesion in or adjacent to the right hepatic dome that was seen and described on the recent CT study of 11/26/2013.  It is photopenic relative to the surrounding liver.  2.  There is no evidence of hypermetabolic soft issue mass lesion or free fluid in the abdomen or pelvis.  3.  The tonsillar tissues are slightly enlarged and have moderate activity level.  This maybe physiologic.  Correlation with oral cavity, examination is recommended.  4.  Mild amount of activity in the right level II jugular lymph chain without focally enlarged lymph nodes is of questionable significance.  · 1/6/13 - The patient received cycle 5 of chemotherapy with Taxol and Carboplatin.  · 1/27/14 - The patient started on cycle 6 of Taxol and Carboplatin.  · 2/18/14 - CT of the abdomen and pelvis with contrast; stable lesions impressing upon the hepatic dome, unchanged compared to the prior exam.  Multiple anterior abdominal wall hernias re-demonstrated.  Those above the umbilicus contain omental fat.  Below and to the left of the umbilicus as anterior abdominal hernia containing omental fat in nondilated small bowel which is larger than seen previously.  · 2/20/14 - The patient received cycle 7 of chemotherapy with Taxol and Carboplatin.   · 3/11/14 - Order written to discontinue chemotherapy due to patient has completed 7 cycles of chemotherapy and CT scans suggest possible remission.  · 3/11/14 -  11.0 (N).  · 06/05/14 - CT scan of the chest abdomen and pelvis with contrast: There are multiple anterior abdominal wall hernias.  There are 2 larger anterior abdominal wall hernias at the level of the transverse colon, which contain omental fat.  There are at least 2 small anterior abdominal wall hernias that contain omental fat.  There is a moderate sized anterior abdominal wall  hernia at the level of the umbilicus, eccentric to the left, which contain non-dilated bowel.  Interval decrease in the size of two deposit impressing on the liver.  The third tiny deposit has been stable.  · 8/19/14 -  10.4 (N).  · 12/19/14 -   10.0 (N)  · 1/7/15 - CT chest, abdomen and pelvis with stable appearance of the chest with several micronodules. Small hiatal hernia, slightly larger than previous exam, increased from 1.5 to 2.5 cm in diameter. Bochdalek hernia unchanged. Multiple hepatic lesions. The two dominant lesions at the right hepatic dome had decreased in size from previous. The remaining tiny nodules measured 3-5 mm in diameter and were unchanged. There was no evidence of new intra-abdominal or pelvic mass or free fluid. There were multiple anterior abdominal wall hernias which had increased in size.   · 7/27/15 - Comprehensive metabolic panel with no significant abnormalities. CA-125 of 21 (0-35).   · 10/26/15 - WBC 6, hemoglobin 13, platelet count 204,000. Heart rate 47. CA-125 of 20 (0-35).    · 2/18/16 - CT chest with contrast with stable micronodular density seen in the lungs without significant change from prior exam. CT abdomen and pelvis with regression of liver lesions with no new evidence of metastasis. Multiple ventral hernias again noted without significant change from prior exam. Creatinine 0.9 (0.6-1.3).    · 2/25/16 - Patient hospitalized at [facility] between 2/25/16 and 2/27/16 with pyelonephritis secondary to E-coli. She was given two days of IV Rocephin and then placed on oral Levaquin. She was asked to hold her Coumadin, but then had nausea and vomiting because of Levaquin and stopped the Levaquin also. Her CA-125 was 32 (0-35) and CEA 1.3 (0-5). Her urine grew >100,000 E-coli. CT of the abdomen and pelvis was done which revealed 4.1 x 1.8 cm sized mild ovoid area of decreased density in the liver parenchyma along the anteromedial aspect of the dome of  the right lobe of the liver, unchanged significantly since the previous study of 2/18/16. There was suspicion of a very small pericardial effusion. There was suspicion of a small hiatal hernia. There were several hernias in the anterior abdominal wall. A 3.5 x 3.1 cm incised well-circumscribed abnormal density in the left retroperitoneal fatty soft tissue at the level of the left iliac crest was suggestive of tumor recurrence. There was an abnormal density in the left retroperitoneal soft tissues in the left flank that partially surrounded the left kidney and extended downward to the left pelvic area. Diverticulosis of the distal descending colon and sigmoid colon were seen.     · 3/8/16 - Patient prescribed Bactrim DS 1 p.o. b.i.d. for 10 days for pyelonephritis, which was partially treated with Rocephin and Levaquin. Urine with 40,000 E-coli treated with Macrobid for 7 days.  of 20 (0-35).    · 3/31/16 - PET scan with area of low density anteriorly in the right lobe of the liver with no significant radiopharmaceutical uptake to suggest malignancy. Multiple round soft tissue density masses showing increased radiopharmaceutical activity and multiple anterior abdominal wall hernias.   · 5/10/16 - Patient complains of venous enlargement of the left chest wall around the port. Has not been seen by  yet, but has an appointment on 5/23/16. Complained of a cough.   · 5/12/16 - Infusaport contrast study with no evidence of fibrin sheath. Chest x-ray with mild cardiac prominence.   · 5/23/16 - Patient seen in consultation by Sheng Bowman M.D. at [facility] and a chemotherapy trial recommended.   · 6/1/16 -   of 27.1 (0-35).    · 6/14/16 - WBC 5.71, hemoglobin 12.4, platelet count 188,000. Patient is scheduled for surgery at  on 6/16/16 after further discussion with  physicians. Discussed adjuvant chemotherapy.   · 6/16/16 - Excisional biopsy of abdominal wall mass performed by Sheng Bowman  M.D. at [facility] with pathology revealing metastatic high-grade papillary serous carcinoma.   · 7/7/16 - Received a call from the patient that Tramadol was not working and that she was given Norco #24 after her biopsy at  which did help her pain. Patient prescribed Norco 5/325 one p.o. q. 4-6 hours p.r.n. #60 with no refills. Echocardiogram with left ventricular inferior wall hypokinesis with ejection fraction of 50-55%.   · 7/8/16 - Patient seen in consultation by Sheng Bowman M.D. in consultation at  for metastatic high-grade papillary serous carcinoma diagnosed by excisional biopsy on 6/16/16 with carcinomatosis and patient not a surgical resection candidate. Genetic sequencing and susceptibility testing of tumor was ordered. Biopsy was done of anterior abdominal wall.   · 7/7/16 - Echocardiogram with left atrial enlargement. Mild mitral regurgitation. Left ventricular proximal inferior wall hypokinesis with ejection fraction of 50-55%.   · 7/11/16 - While waiting for genomics results, in order not to delay treatment further, order written for Taxotere 60 mg/M2 IV over one hour followed by Carboplatin AUC 5 over one hour, cycles to be repeated every three weeks.  Comprehensive metabolic panel normal.  26 (0-35).    · 725/16 - Pain contract signed for use of Norco.   · 8/5/16 - Patient stated that she experienced a red rash and was itchy post taking Norco. Norco discontinued and Tramadol refilled.   · 8/16/16 - Patient started cycle 1 chemotherapy. WBC 7.1, hemoglobin 11.2, MCV 88.7, platelet count 94,000. Patient complained of nausea for a week post chemo. Already getting Emend, Aloxi and Decadron. Added Omeprazole 40 mg daily orally.   · 8/17/16 - Urine culture with >100,000 E-coli.   · 8/25/16 - Cycle 2 chemotherapy given.   · 9/9/16 - Magnesium 1.3, replaced intravenously. Potassium 3.4 (3.6-5.1), increased oral potassium supplementation.    · 9/16/16 - Cycle 3 chemotherapy given.    · 9/19/16 - Comprehensive metabolic panel with no significant abnormality.   · 9/20/16 - CT abdomen and pelvis with evidence of progressive metastatic disease in the abdomen and pelvis with interval enlargement of several soft tissue attenuations and subcutaneous nodules in the anterior abdominal wall. Interval enlargement of a left pelvic soft tissue attenuation mass. A lentiform area of low attenuation in the dome of the liver stable in size. Multiple anterior abdominal wall hernias containing fat and/or bowel. Marked pelvic floor laxity. CT chest negative for evidence of metastatic disease. Tiny pulmonary nodules in the right lung stable since 2013 consistent with a benign etiology.   · 9/23/16 - Macrobid 100 mg p.o. b.i.d. x7 days prescribed for UTI. Current chemotherapy discontinued after discussion and new chemotherapy orders written. Carboplatin AUC-5 IV day 1 and Doxil (Liposomal Doxorubicin) 30 mg/M2 IV day 1 to cycle q. 21 days.  of 18 (0-35). Magnesium 1.6, replaced intravenously. Comprehensive metabolic panel with potassium 3.4 (3.6-5.1).     · 10/13/16 - Patient started on cycle 1 of Carboplatin and Liposomal Doxorubicin followed by Neulasta.   · 11/3/16 - Cycle 2 Carbo and Doxil given.   · 11/17/16 - Magnesium 1.0, replaced intravenously. Oral potassium supplement increased.   · 11/29/16 - CT chest with small non-calcified right pulmonary nodules unchanged. Benign bone island in the midthoracic vertebral body along with benign bony hemangiomas in the middle thoracic vertebral bodies. CT abdomen and pelvis with mild progressive metastatic disease from CT of September 2016. Anterior abdominal wall mass superiorly mildly increased in size with new area noted as described measuring 3 x 1.7 cm. 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 Stable area of decreased uptake in the dome of the liver not hypermetabolic on recent PET scan, likely representing  cyst or focal fatty infiltration. Stable small hiatal hernia, fat-containing Bochdalek’s hernia and anterior abdominal wall hernias with stable pelvic floor relaxation.    · 12/1/16 - Cycle 3 chemotherapy given.   · 12/8/16 - Current chemotherapy discontinued and patient started on Gemcitabine 1000 mg/M2 IV days 1, 8, 15 q. 28 days.   · 12/22/16 - Patient seen in followup by Juliana Roy M.D. at the pain clinic, treated with Oxycodone and Lyrica. Transaminases normal. Patient started cycle 1 chemotherapy.   · 12/29/16 - Magnesium 1.1 (1.8-2.5), replaced intravenously.   · 1/5/17 - Cycle 1 day 15 chemotherapy held as patient leaving for vacation to [place]. Chemotherapy dose decreased by 20%. Patient complains of diarrhea for which Imodium prescribed.   · 1/11/17 - BRCA-1 and BRCA-2 negative.   · 1/12/17 - Echocardiogram with ejection fraction 60% or greater.   · 1/19/17 - Comprehensive metabolic panel with no significant abnormality. Patient started cycle 2 chemotherapy.   · 2/2/17 - Urine with >100,000 E-coli treated with Cipro 500 mg p.o. b.i.d. x1 week.   · 2/6/17 - Magnesium 1.1 (1.8-2.5), replaced intravenously.    · 2/23/17 - Patient started cycle 3 chemotherapy.   · 2/27/17 - CT chest with interval development of too numerous to count very small pulmonary nodules, ill-defined ground glass opacities concerning for development of pulmonary metastatic disease. Stable left-sided chest port. CT abdomen and pelvis with stable appearance of multiple small soft tissue densities within the abdomen near midline subcutaneous fat of the abdominal wall. Interval decrease in size of largely calcified left pelvic mass and multiple fat in bowel containing small ventral hernias.   · 3/6/17 - Pulmonary consultation obtained for lung nodules. Discussed CT results with patient. Decision made to continue present chemotherapy until further lung evaluation as abdominal disease better.  of 18 (0-35).    · 3/16/17 -  Patient started cycle 4 chemotherapy, but treatment held from day 8 onwards because of hospitalization.   · 3/19/17 - Patient was hospitalized at [facility] between 3/19/17 and 3/25/17 with chest pain. Chest x-ray had revealed increased mild bibasilar airspace disease. Troponin I and EKG’s were negative. INR was elevated. Patient was treated with antibiotics. EGD was performed revealing small hiatal hernia and esophageal dilatation was performed. Bronchoscopy was performed also with no intrabronchial lesions identified. IgA was 51 (), IgG 320 (600-1500) and IgM 19 (). Lexiscan nuclear stress test had no evidence of reversible myocardial ischemia with normal left ventricular ejection fraction of 58%. CT chest had development of patchy bilateral ground glass opacities most pronounced involving the left upper lobe and bilateral lower lobes. Multiple tiny bilateral pulmonary nodules were less conspicuous. The patient underwent a bronchoscopy by Jerica Esparza M.D. with right upper lobe bronchoalveolar lavage revealing benign squamous cells and bronchial cells with pulmonary macrophages present. There was mild acute inflammation. Negative for malignant cells. Prilosec was changed to Famotidine for hypomagnesemia.    · 4/6/17 - Magnesium 1.2 (1.8-2.5). Comprehensive metabolic panel with no significant abnormality. Patient seen in follow-up by Fito Ram M.D. at [facility]. Treated with Lyrica and Oxycodone.    · 5/4/17 - Patient complains of a rash itching on her right upper arm. Eczematous rash noted and patient prescribed Cyclocort 0.1% b.i.d. Magnesium infusions continued with each chemo. Order written to resume chemotherapy.   · 5/16/17 - Cycle 5 chemotherapy started.   · 5/26/17 - Magnesium 1.4 (1.8-2.5), replaced intravenously. Potassium 2.9 (3.6-5.1), KCL increased to 20 mEq three in the morning and three in the evenings.   · 5/30/17 - PET scan with remaining metabolic activity within the  nodular areas. The suggested mass which is partially calcified and necrotic within the left aspect of the pelvis seems slightly larger with increased metabolic activity. There was more calcification in these areas compared to prior study, suggesting inflammation.      · 6/8/17 - Patient complaining of shortness of breath. Does take HCTZ at home. Chest x-ray ordered and chemotherapy continued along with weekly magnesium replacement. Chest x-ray with no acute cardiopulmonary abnormalities.   · 6/13/17 - Patient received cycle 6 of chemotherapy.   · 7/31/17 - WBC 5.0, hemoglobin 11.2, MCV 89.7, platelet count 217,000. Patient is to resume chemotherapy starting with cycle 7 on Wednesday of this week.  of 19 (<35). Potassium 3.3 (3.5-5.3).     · 8/2/17 - Patient began cycle 7 of chemotherapy.   · 8/30/17 - Patient started cycle 8 chemotherapy.   · 9/5/17 - Patient seen in followup at Pain Management by Fito Ram M.D. and continued on treatment with Oxycodone and Lyrica.   · 9/13/17 - Patient was hospitalized at [facility] for a day with dizziness secondary to dehydration, hypokalemia and anemia. She was given 1 unit of packed red blood cells and electrolytes were replaced. Chest x-ray revealed a subtle opacity in the left lateral lung base favored to represent atelectasis. Magnesium 1.3 (1.5-2.5), patient continued on replacement.    · 9/22/17 - Chemotherapy and magnesium replacement continued with decrease in chemotherapy dose by 10% secondary to cytopenias.   · 9/25/17 - Cycle 9 chemotherapy started.   · 10/12/17 - CT of the chest with contrast showed several tiny pulmonary nodules. One within the right lower lobe appears new from prior exams. Two adjacent foci of nodularity within the medial right lower lobe suggestive of minor infectious or inflammatory process. CT of the abdomen and pelvis with contrast which showed soft tissue nodules along the anterior abdominal and pelvic walls unchanged from previous  scan. Also a partially calcified mass within the left pelvis unchanged. No acute process identified within the abdomen or pelvis. Several left paracentral ventral hernias unchanged. No evidence of acute bowel obstruction.   · 10/16/17 - Order written for Levaquin 500 mg p.o. daily as the patient states that she has had a productive cough, fever and chills and with the results of the CT scan showing a possible infectious versus inflammatory process. Order also written to continue chemotherapy and magnesium replacement as previously prescribed.   · 10/23/17 - Cycle 10 chemotherapy started.   · 11/19/17 - Patient went to the Emergency Room at [facility] complaining of right lower extremity redness and fever for a day. Venous Doppler had no evidence of a DVT and patient was discharged home on Clindamycin.   · 11/21/17 -  of 17 (0-35).   · 12/4/17 - Cycle 11 chemotherapy started.   · 12/20/17 - PET scan with multiple hypermetabolic soft tissue nodules abutting the anterior abdominal wall, stable from previous examination. Peripherally calcified left lower quadrant mass near the ascending colon stable in size demonstrating no hypermetabolic uptake. Previously had maximum SUV of 7. Right medial basilar pulmonary nodules resolved.   · 12/22/17 - CT left lower extremity with soft tissue swelling with skin thickening present along the anterior and medial aspect of the leg, slightly more pronounced around the palpable marker seen within the upper medial aspect of the lower extremity.   · 12/26/17 - Elastic stockings prescribed for lower extremity edema.   · 1/8/18 - Patient hospitalized at [facility] between 1/8/18 and 1/11/18 with fever of up to 101 degrees and painful rash on the lower extremities of several weeks’ duration. She was found to be hypokalemic and MRI of the lower extremities revealed thickening and swelling. Chest x-ray had no acute cardiopulmonary abnormality. Skin biopsy was performed by Surgery revealing stasis  dermatitis and no evidence of malignancy. Infectious Disease consultation was obtained and IV antibiotics were stopped. Blood cultures had no growth.   · 1/22/18 - Patient asked to start wearing elastic stockings which she has not started yet. She was given a prescription for Dyazide 1 p.o. daily.   · 2/26/18 - Ordered chemo to resume again. Patient unaware that she was supposed to resume chemo after her last visit in January. Continue magnesium infusions on day 1, 8 and 15. Prescribed Levaquin 500 mg p.o. daily x10 days for productive cough x1 week. History of viral illness consistent with influenza.  of 18 (0-35). Chest x-ray with no acute process.    · 3/5/18 - Cycle 12 chemotherapy started.   · 3/26/18 - Options discussed with patient. Decision made to discontinue IV Gemzar and orders written to start on Niraparib (Zejula) 300 mg p.o. daily as maintenance therapy.   · 4/11/18 - Niraparib discontinued due to insurance denial. Orders written to start Carboplatin-AUC 5 IV day 1 cycling every 21 days. Orders written for Neulasta 6 mg subcutaneous kit day 1 with palliative treatment intent and expected duration of treatment three months.   · 4/12/18 - CT chest, abdomen and pelvis with contrast revealed numerous tiny centrilobular nodules in the lungs favored to reflect infectious or inflammatory process. Nodules ranged 1-3 mm in size and are new from prior studies with metastatic disease not entirely excluded. Stable small hiatal hernia. Interval progression of metastatic disease involving the subcutaneous tissues at the ventral abdominal wall. Multiple soft tissue density nodules previously shown to be hypermetabolic on PET scan. New small crescent of low attenuation material along the periphery of the spleen with similar finding at the dome of the liver. Findings are concerning for peritoneal metastases. Density of the dome of the liver remained unchanged from multiple prior studies. Stable calcified mass in  the left pelvic sidewall. Urinary bladder wall thickening.   · 4/23/18 - Cycle 1 chemotherapy with Carboplatin administered along with Neulasta injection.   · 4/26/18 - Neulasta discontinued due to insurance denial.   · 5/14/18 - Patient received cycle 2 Carboplatin.   · 6/5/18 - Cycle 3 day 1 chemotherapy with Carboplatin administered.   · 6/20/18 - CT chest, abdomen and pelvis at [facility] showed re-demonstration of soft tissue mass in the ventral wall hernia left of the midline as well as the dome of the liver, likely representing metastatic disease similar as compared to the prior study. Additional calcified lesions present in the upper left hemipelvis and along the anterior abdominal wall to the right of the midline also likely representing metastatic disease. No definite new lesions were identified. Moderate to large stool burden likely related to mild constipation was present. No suspicious lung nodules were identified. The previously-described ground glass nodules appeared to have resolved. There was a stable subpleural nodule in the right upper lobe measuring 3 mm present since 2014 without significant changes.   · 6/26/18 - Cycle 4 day 1 Carboplatin administered with addition of Neulasta for febrile neutropenia prophylaxis.   · 6/29/18 - Reviewed CT scans with patient. Orders written to discontinue Carboplatin and Neulasta and plan to start Niraparib 300 mg by mouth daily as maintenance therapy. Prescribed Amlodipine 2.5 mg p.o. daily for chemo-induced hypertension.   · 7/18/18 - Orders written to increase weekly Magnesium Sulfate to 3 g IV weekly due to continued hypomagnesemia.   · 8/1/18 - Patient reports she has not received Niraparib (Zejula) to date.   · 8/30/18 - Patient’s phone was not working so though Niraparib approved, the drug company had not been able to get ahold of her. Patient supplied with drug company number so that she can start taking the pills.   · 9/6/18 -  of 20 (N).    · 9/18/18 - Urinalysis done for dysuria and back pain. Urine culture grew 20,000 to 50,000 colonies of urogenital ruy. Prescribed Bactrim DS one tablet twice daily for one week.   · September 2018 - Patient initiated on Zejula 300 mg p.o. daily.   · 10/1/18 - WBC 3.5, hemoglobin 12, platelet count 74,000. Niraparib (Zejula) dose held and then resumed when platelets above 100,000 at a dose of 200 mg daily.   · 10/15/18 - Patient received Niraparib (Zejula) at 200 mg p.o. daily with a platelet count of 198,000.   · 11/7/18 - WBC 6, hemoglobin 11.6, MCV 96.2, platelet count 137,000. Patient claims to have stopped taking Niraparib a few days prior because of cough. Asked to resume taking it. Ciprofloxacin 500 mg p.o. twice daily for one week for bronchitis.   · 12/3/18 - Magnesium Sulfate infusions changed to every two weeks, to send mag level before and give 3 grams. DC’d Magnesium Oxide and started Magnesium Plus Protein two pills twice daily.   · 1/4/19 - CT chest, abdomen and pelvis with new patchy nodular areas of airspace consolidation within the bilateral upper lobes, left greater than right, favored to be infectious or inflammatory. Interval enlargement of the peritoneal soft tissue masses within the pelvis compatible with progression of disease. Enlarging ventral hernia now containing multiple loops of small bowel and colon.   · 1/7/19 - Patient has not been coming in for weekly magnesium replacement as nursing has not been able to get ahold of her. Results of CT’s discussed with patient. Decision made to change her to IV chemotherapy with Carboplatin AUC-5 day 1 and pegylated liposomal Doxorubicin (Doxil) 30 mg/M2 IV day 1 to cycle every four weeks. Patient made aware of the limited choices of her treatment available.   · 1/31/19 - Echocardiogram showed LVEF 50-55% and otherwise normal echo Doppler study.   · 2/4/19 - New chemotherapy not initiated to date with difficulty contacting patient for  echocardiogram. Neulasta On-Pro 6 mg ordered with chemotherapy due to extensive prior exposure to chemotherapy, increasing risk of febrile neutropenia, history of neutropenic events on prior chemotherapy regimens.   · 2/15/19 - Patient started cycle 1 chemotherapy with Neulasta support.   · 3/6/19 -  of 28 (0-35).   · 3/15/19 - Cycle 2 Carboplatin with Liposomal Doxorubicin (Doxil) and Neulasta support initiated.     · 4/10/19 - Patient was requested to followup with Dr. Queen regarding hypotension and blood pressure medication dosing. Patient appears to be tolerating treatment well with cytopenias not requiring dose adjustments. No Doxil-related skin or mucus membrane toxicities or other significant toxicities to date.   · 4/12/19 - Cycle 3 chemotherapy given.   · 4/16/19 - CT chest, abdomen and pelvis with no evidence of active metastasis to the chest. Several tree-in-bud nodules within the periphery of the inferior right upper lobe likely infectious or inflammatory. Disease burden within abdomen and pelvis stable to slightly increased from prior CT. Specifically, a soft tissue mass along the right rectus muscle slightly increased in bulk. Multiple ventral hernias, some containing loops of bowel, with no evidence of acute bowel obstruction.   · 5/8/19 - Discussed CT results with patient. Overall stable disease and would like to continue same treatment. Dove soap and Hydrocortisone Cream p.r.n. prescribed for scattered rash on back.   · 5/10/19 - Cycle 4 Carboplatin and Liposomal Doxorubicin initiated.   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 It was ER positive, HER2/zuleima negative, PD-L1 negative. There was TOPO1 positivity and TUBB3 positivity. TMB was low (two mutations per megabyte MUTS/MB) and MSI was stable. There were 13 therapies considered with increased benefit. The 13 therapies with increased benefit included Fulvestrant,  Irinotecan, Letrozole, Topotecan, Anastrazole, Exemestane, Tamoxifen, all of which were referenced to [facility] as well as Abemaciclib, Everolimus, Gefitinib, Palbociclib, Ribociclib and Toremifene.     · 6/4/19 - Past consented to [facility] registry by research coordinator, Genoveva Vu R.N. Patient tolerating Carboplatin and Liposomal Doxorubicin well with some expected cytopenias and some mild and tolerable Neulasta induced bone pain.  · 6/5/19 echocardiogram with preserved LV systolic function with EF around 60%.  · 6/7/19 cycle 5 chemotherapy with Neulasta support given.  Patient continued on mag oxide 400 mg p.o. twice daily and weekly IV 3 g mag sulfate infusions for persistent hypomagnesemia.  · 7/5/2019- cycle 6 chemotherapy with carboplatin and doxorubicin with Neulasta port initiated.  Ca125:  21 (0-35).  · 7/23/2019-CT chest abdomen and pelvis compared to CT from 4/16/2019 revealed multiple small pulmonary nodules unchanged from prior examination within the right upper and left lower lobes.  Complex ventral hernia similar to prior exam.  Soft tissue nodule along the right lateral margin of the right of the midline measuring 12 x 10 mm unchanged in size.  Irregular soft tissue nodular thickening along the margin of the most inferior aspect of the complex ventral hernia with thickness measuring up to 13 mm similar to prior examination.  Extensive calcified and soft tissue mass within the left lower quadrant decrease in size now measuring 2.6 x 2.3 cm previously 3.0 x 2.5 cm.  Partially calcified mass involving the right rectus sheath measuring 3.1 x 2.7 cm previously measured 3.3 x 2.9 cm.  Densely calcified mass on measuring 2.1 x 1.6 cm unchanged previously measured 2 x 1.7 cm.  Right external iliac chain lymph node measuring 9 mm previously measured 5 mm.  · 8/2/2019 cycle 7 chemotherapy given.    2. Deep vein thrombosis of left upper extremity diagnosis established in October of 2013.  · 10/16/13 - Venous  Doppler of left upper extremity.  Impression:  Deep vein thrombosis involving the subclavian, axillary and brachial veins on the left side, superficial vein thrombosis involving the left basilic vein.  · 10/16/13 - Order written for Lovenox 120 mg subcutaneously daily until INR is in therapeutic range.  Order written for Coumadin 5 mg p.o. daily.  The patient is to follow PT and INR protocol.  · 5/20/16 - Venous Doppler bilateral lower extremities normal.  · 7/30/19 - Patient continued on Coumadin following the INR protocol.     3. Anemia diagnosis established in November 2013 and pernicious anemia diagnosis established March 2016.   · 11/15/13 - Hemoglobin is 7.8.  · 12/2/13 - Orders written to start Aranesp 200 mg subcutaneous every two weeks due to low hemoglobin secondary to chemo induced anemia.  Hemoglobin is 9.7.  Anemia workup is ordered.  · 12/03/13 - Ferritin 179.8 (N), folic acid 8.3 (N), vitamin B12 314, iron 104 (N), TIBC 298 (N), iron saturation 35 (N), Reticulocyte count 0.88 (N).  EPO level 124 (N).  Haptoglobin 22 (H).  · 10/22/14 - Hemoglobin 12.5, hematocrit 37.3, MCV 91.6.  · 10/23/14 - Anemia resolved.   · 3/8/16 - WBC 5.8, hemoglobin 11.7, MCV 90.3, platelet count 245,000. Vitamin B12 of 189 (180-914), ferritin 61 (), iron 91 (), TIBC 345 (228-428).   · 3/15/16 -  ().        · 3/22/16 - Intrinsic factor antibody positive, antiparietal antibody negative. Vitamin B12 at 1000 mcg IM weekly started, but the patient after receiving first dose on 3/22/16 did not come back for injections until 4/13/16.   · 4/13/16 - WBC 5.1, hemoglobin 12.5, MCV 87.9, platelet count 201,000. Patient given B12 injection and then continued at home.   · 5/10/16 - WBC 5.1, hemoglobin 12.5, platelet count 201,000.   · 6/14/16 - Monthly Vitamin B12 injections continued at home.   · 7/11/16 - WBC 5.48, hemoglobin 12.7, MCV 86.2, platelet count 200,000.   · 8/16/16 - Patient continued on monthly  Vitamin B12 injections at home.   · 1/5/17 - WBC 2.6 with 38% neutrophils, 56% lymphocytes, 5% monocytes, hemoglobin 10.5, .3, platelet count 63,000. Patient continued on Vitamin B12 injections 1000 mcg IM monthly at home.   · 3/20/17 - Retic 0.41 (0.5-1.5), creatinine 1.0 (0.4-1.0). Iron 12 (), TIBC 270 (228-428), ferritin 259 (), haptoglobin 340 (), TSH 0.43 (0.34-5.6), folate 6.0 (5.9-24.8). Serum protein electrophoresis revealed decreased gammaglobulins. Serum EDU had no monoclonal gammopathy identified. Stool Hemoccult was negative.   · 6/8/17 - WBC 6.3, hemoglobin 8.3, MCV 92.4, platelet count 50,000. Procrit 40,000 units subq weekly added for chemotherapy-induced anemia. Patient continued on Vitamin B12 at 1000 mcg IM monthly at home.   · 7/5/17 - Iron 33 (), TIBC 328 (228-428), ferritin 211 (), TSH 2.46 (0.34-5.6).       · 7/31/17 - WBC 5.0, hemoglobin 11.2, MCV 89.7, platelet count 217,000. We will continue with the Procrit 40,000 units subq weekly for chemo-induced anemia when the hemoglobin falls below 10.0 and the patient is also to continue with the Vitamin B12 at 1000 mcg IM monthly at home. Creatinine 1.24 (0.5-0.99).    · 8/28/17 - WBC 9.6, hemoglobin 8.7, MCV 93.7, platelet count 133,000. Patient is to continue with the Procrit 40,000 units subq weekly and will receive that today. She is also to continue with the Vitamin B12 at 1000 mcg IM monthly at home.  · 10/16/17 - WBC 7.5, hemoglobin 9.6, MCV 98.4, platelet count 195,000. Patient is to continue with Procrit 40,000 units subq weekly and will receive Procrit today.   · 1/1/18 - Creatinine 1.3 (0.4-1.0).    · 2/19/18 - Procrit given for hemoglobin 9.9.   · 2/26/18 - Continue Procrit 40,000 units weekly p.r.n. hemoglobin <10. Continue Vitamin B12 at 1000 mcg IM monthly at home.   · 3/26/18 - Hemoglobin 8.3. Procrit dose increased to 60,000 units weekly. Iron 29 (), TIBC 306 (228-428), and iron sat 9%  (15-50). Iron 29 (), TIBC 306 (228-428), iron saturation 9% (15-50).   · 3/27/18 - Order written to start Ferrous sulfate 325 mg p.o. b.i.d.    · 4/2/18 - Stool heme negative x3.   · 4/30/18 - Patient had not started Ferrous sulfate 325 mg p.o. b.i.d. and verbalized understanding to do so and to notify the office if side effects are not tolerable.   · 5/24/18 - Patient was hospitalized at [facility] between 5/24/18 and 5/26/18 with dark tarry stool. INR was subtherapeutic. She was given IV Protonix and Protonix 40 mg daily. She underwent an EGD by David Rogers M.D. revealing small hiatal hernia, small submucosal nodule near the cardia, erosive gastritis. Pathology on gastric antrum and body biopsy revealed iron pill gastritis and no evidence of Helicobacter pylori. She then underwent a colonoscopy revealing diverticulosis, hemorrhoids and surgical changes from previous resection. It was recommended to consider outpatient M2A if hemoglobin continued to drop.   · 6/4/18 - Order written to discontinue iron pills and to use Injectafer 750 mg IV days 1 and 8 for low iron sats. Order written for Procrit 40,000 units subq weekly. Iron 65 (), TIBC 328 (228-428), iron saturation 20% (15-50), ferritin 123 ().   · 6/29/18 - Discussed patient’s intolerance of oral iron due to gastritis. Oral iron discontinued with plans for Injectafer should iron level drop in the future.   · 11/7/18 - Patient claims not to be taking Vitamin B12 injections at home. Asked to resume those.   · 12/3/18 - Patient reports she has not been taking her B12 injections for a couple of months. She needs some syringes and needles for that.   · 2/4/19 - Patient is uncertain if she is currently taking Ferrous Sulfate or not. Patient with history of intolerance and will follow hemoglobin.   · 5/8/19 - Ferritin 64 ().    · 7/30/19-Hgb 10.1.    Past Medical History:   Diagnosis Date   • Hypertension    • Osteoporosis    • Ovarian  "cancer (CMS/HCC)        Past Surgical History:   Procedure Laterality Date   • COLON SURGERY     • COLONOSCOPY     • EXPLORATORY LAPAROTOMY     • HERNIA REPAIR     • HYSTERECTOMY     • OOPHORECTOMY           Current Outpatient Medications:   •  Acetaminophen 500 MG coapsule, Take 100 mg by mouth 4 (Four) Times a Day. As needed, Disp: , Rfl:   •  atorvastatin (LIPITOR) 20 MG tablet, Take 20 mg by mouth every night at bedtime., Disp: , Rfl: 3  •  Cyanocobalamin (B-12 COMPLIANCE INJECTION) 1000 MCG/ML kit, Inject 1,000 mcg into the appropriate muscle as directed by prescriber Every 30 (Thirty) Days., Disp: , Rfl:   •  famotidine (PEPCID) 40 MG tablet, TAKE 1 TABLET BY MOUTH EVERY MORNING BEFORE BREAKFAST, Disp: 90 tablet, Rfl: 1  •  hydrochlorothiazide (HYDRODIURIL) 25 MG tablet, Take 25 mg by mouth Daily., Disp: , Rfl:   •  lisinopril (PRINIVIL,ZESTRIL) 20 MG tablet, Take 20 mg by mouth Daily., Disp: , Rfl:   •  LYRICA 100 MG capsule, Take 1 capsule by mouth 3 (Three) Times a Day., Disp: 90 capsule, Rfl: 1  •  magnesium oxide (MAG-OX) 400 MG tablet, Take 1 tablet by mouth 2 (Two) Times a Day., Disp: 60 tablet, Rfl: 3  •  ondansetron ODT (ZOFRAN-ODT) 8 MG disintegrating tablet, PLACE 1 TABLET IN MOUTH TO DISSOLVE EVERY 8 HOURS AS NEEDED FOR NAUSEA, Disp: 20 tablet, Rfl: 3  •  oxyCODONE (ROXICODONE) 10 MG tablet, Take 1 tablet by mouth 3 (Three) Times a Day As Needed for Moderate Pain ., Disp: 90 tablet, Rfl: 0  •  potassium chloride (KLOR-CON) 20 MEQ CR tablet, Take 20 mEq by mouth Daily., Disp: , Rfl:   •  sertraline (ZOLOFT) 50 MG tablet, TAKE 1 TABLET BY MOUTH EVERY EVENING BEFORE BED, Disp: 90 tablet, Rfl: 1  •  Syringe/Needle, Disp, (B-D 3CC LUER-JESSICA SYR 23GX1\") 23G X 1\" 3 ML misc, BD LUER-JESSICA SYRINGE 23G X 1\" 3 ML, Disp: , Rfl:   •  temazepam (RESTORIL) 15 MG capsule, Take 1 capsule by mouth At Night As Needed for Sleep., Disp: 30 capsule, Rfl: 2  •  triamterene-hydrochlorothiazide (DYAZIDE) 37.5-25 MG per " capsule, TAKE 1 CAPSULE BY MOUTH EVERY DAY, Disp: 90 capsule, Rfl: 1  •  valACYclovir (VALTREX) 500 MG tablet, Take 1,000 mg by mouth Daily., Disp: , Rfl:   •  warfarin (COUMADIN) 1 MG tablet, Take 6 mg by mouth Daily., Disp: , Rfl: 3  •  warfarin (COUMADIN) 5 MG tablet, Take 5 mg by mouth Daily., Disp: , Rfl: 3    Allergies   Allergen Reactions   • Morphine Nausea Only and Hives   • Penicillin V Potassium Rash   • Sulfa Antibiotics Rash   • Levaquin [Levofloxacin] Nausea Only and Dizziness     When taken with Coumadin   • Amoxicillin Rash   • Norco [Hydrocodone-Acetaminophen] Rash       Family History   Problem Relation Age of Onset   • Hypertension Mother    • Cancer Father    • Hypertension Father    • Hypertension Sister    • Hypertension Brother    • Stroke Brother        Cancer-related family history includes Cancer in her father.    Social History     Tobacco Use   • Smoking status: Never Smoker   • Smokeless tobacco: Never Used   Substance Use Topics   • Alcohol use: No     Frequency: Never   • Drug use: No       I have reviewed the history of present illness, past medical history, family history, social history, lab results, all notes and other records since the patient was last seen on 7/30/19.    SUBJECTIVE: The patient is here for a follow up of Ovarian Cancer.       SIGIFREDO Atkinson present during office visit.        REVIEW OF SYSTEMS:  Review of Systems   Constitutional: Negative for chills and fever.   HENT: Negative for ear pain, mouth sores, nosebleeds and sore throat.    Eyes: Negative for photophobia and visual disturbance.   Respiratory: Negative for wheezing and stridor.    Cardiovascular: Negative for chest pain and palpitations.   Gastrointestinal: Negative for abdominal pain, diarrhea, nausea and vomiting.   Endocrine: Negative for cold intolerance and heat intolerance.   Genitourinary: Negative for dysuria and hematuria.   Musculoskeletal: Negative for joint swelling and neck stiffness.  "  Skin: Negative for color change and rash.   Neurological: Negative for seizures and syncope.   Hematological: Negative for adenopathy.        No obvious bleeding   Psychiatric/Behavioral: Negative for agitation, confusion and hallucinations.       OBJECTIVE:    Vitals:    08/27/19 1121   BP: 111/58   Pulse: 71   Resp: 18   Temp: 98.5 °F (36.9 °C)   Weight: 82.6 kg (182 lb 3.2 oz)   Height: 165.1 cm (65\")   PainSc: 0-No pain     ECOG  (1) Restricted in physically strenuous activity, ambulatory and able to do work of light nature    Physical Exam   Constitutional: She is oriented to person, place, and time. She appears well-developed and well-nourished. No distress.   HENT:   Head: Normocephalic and atraumatic.   Nose: Nose normal.   Mouth/Throat: Oropharynx is clear and moist. No oropharyngeal exudate.   Dental fillings and torus pleneous.    Eyes: Conjunctivae and EOM are normal. Pupils are equal, round, and reactive to light. Right eye exhibits no discharge. Left eye exhibits no discharge. No scleral icterus.   Eye glasses present.    Neck: Normal range of motion. Neck supple. No thyromegaly present.   Left chest wall port.    Cardiovascular: Normal rate, regular rhythm, normal heart sounds and intact distal pulses. Exam reveals no gallop and no friction rub.   No murmur heard.  Pulmonary/Chest: Effort normal and breath sounds normal. No stridor. No respiratory distress. She has no wheezes.   Abdominal: Soft. Bowel sounds are normal. She exhibits no distension and no mass. There is no tenderness. There is no rebound and no guarding.   Musculoskeletal: Normal range of motion. She exhibits no edema, tenderness or deformity.   Lymphadenopathy:     She has no cervical adenopathy.     She has no axillary adenopathy.        Right: No supraclavicular adenopathy present.        Left: No supraclavicular adenopathy present.   Neurological: She is alert and oriented to person, place, and time. She exhibits normal muscle " tone. Coordination normal.   Skin: Skin is warm. No rash noted. She is not diaphoretic. No erythema. No pallor.   Psychiatric: She has a normal mood and affect. Her behavior is normal.   Nursing note and vitals reviewed.    RECENT LABS  WBC   Date Value Ref Range Status   08/27/2019 4.79 3.40 - 10.80 10*3/mm3 Final     RBC   Date Value Ref Range Status   08/27/2019 2.95 (L) 3.77 - 5.28 10*6/mm3 Final     Hemoglobin   Date Value Ref Range Status   08/27/2019 9.7 (L) 12.0 - 15.9 g/dL Final     Hematocrit   Date Value Ref Range Status   08/27/2019 30.8 (L) 34.0 - 46.6 % Final     MCV   Date Value Ref Range Status   08/27/2019 104.4 (H) 79.0 - 97.0 fL Final     MCH   Date Value Ref Range Status   08/27/2019 32.9 26.6 - 33.0 pg Final     MCHC   Date Value Ref Range Status   08/27/2019 31.5 31.5 - 35.7 g/dL Final     RDW   Date Value Ref Range Status   08/27/2019 15.4 12.3 - 15.4 % Final     RDW-SD   Date Value Ref Range Status   08/27/2019 58.0 (H) 37.0 - 54.0 fl Final     MPV   Date Value Ref Range Status   08/27/2019 9.6 6.0 - 12.0 fL Final     Platelets   Date Value Ref Range Status   08/27/2019 168 140 - 450 10*3/mm3 Final     Neutrophil %   Date Value Ref Range Status   08/27/2019 58.6 42.7 - 76.0 % Final     Lymphocyte %   Date Value Ref Range Status   08/27/2019 23.0 19.6 - 45.3 % Final     Monocyte %   Date Value Ref Range Status   08/27/2019 16.5 (H) 5.0 - 12.0 % Final     Eosinophil %   Date Value Ref Range Status   08/27/2019 1.3 0.3 - 6.2 % Final     Basophil %   Date Value Ref Range Status   08/27/2019 0.6 0.0 - 1.5 % Final     Neutrophils, Absolute   Date Value Ref Range Status   08/27/2019 2.81 1.70 - 7.00 10*3/mm3 Final     Lymphocytes, Absolute   Date Value Ref Range Status   08/27/2019 1.10 0.70 - 3.10 10*3/mm3 Final     Monocytes, Absolute   Date Value Ref Range Status   08/27/2019 0.79 0.10 - 0.90 10*3/mm3 Final     Eosinophils, Absolute   Date Value Ref Range Status   08/27/2019 0.06 0.00 - 0.40  10*3/mm3 Final     Basophils, Absolute   Date Value Ref Range Status   08/27/2019 0.03 0.00 - 0.20 10*3/mm3 Final     nRBC   Date Value Ref Range Status   12/17/2018 0 0 /100[WBCs] Final       Lab Results   Component Value Date    GLUCOSE 79 08/26/2019    BUN 18 08/26/2019    CREATININE 1.00 08/26/2019    EGFRIFNONA 56 (L) 08/26/2019    EGFRIFAFRI >60 06/07/2019    BCR 18.0 08/26/2019    K 4.3 08/26/2019    CO2 25.0 08/26/2019    CALCIUM 9.3 08/26/2019    ALBUMIN 3.90 08/26/2019    LABIL2 1.5 06/07/2019    AST 24 08/26/2019    ALT 13 (L) 08/26/2019       ASSESSMENT:    Malignant neoplasm of right ovary (CMS/HCC)  - CBC & Differential  - CBC Auto Differential    Hypomagnesemia  - Magnesium    Other iron deficiency anemia  - Ferritin  - Iron Profile    Malabsorption due to intolerance, not elsewhere classified    Chronic deep vein thrombosis (DVT) of left upper extremity, unspecified vein (CMS/HCC)    Pernicious anemia    Pancytopenia due to antineoplastic chemotherapy (CMS/HCC)  - Adult Transthoracic Echo Complete W/ Cont if Necessary Per Protocol    Encounter for antineoplastic chemotherapy    Long term (current) use of anticoagulants    Encounter for monoclonal antibody treatment for malignancy    The patient claims to be tolerating chemotherapy well and has had a response in disease on CT scans.  Last Ca125 was normal.  She is however getting quite anemic and Procrit will be started again.  She claims not to be taking oral magnesium anymore and is being getting IV replacement.  She is not able to tolerate oral iron.  She continues on warfarin at 6 mg daily with last INR 3.0.  She continues on monthly vitamin B12 injections at home.      PLAN:  Continue chemo until a maximum response is reached.   Echo due in September.   Continue IV magnesium replacement.   Continue coumadin 6 mg daily and follow INR protocol.   Will check iron profile, magnesium and ferritin today.  Retacrit 40,000 units subcu weekly.  Continue  vitamin B12 1000 mcg IM monthly at home.         I have reviewed labs results, imaging, vitals, and medications with the patient today. Will follow up in one month with the nurse practitioner and a CBC.      Patient verbalized understanding and is in agreement of the above plan.    I have reviewed and validated the information above.   Daryn Tay M.D., F.A.C.P.    Much of the above report is an electronic transcription//translation of the spoken language to printed text using Dragon Software. As such, the subtleties and finesse of the spoken language may permit erroneous, or at times, nonsensical words or phrases to be inadvertently transcribed; thus changes may be made at a later date to rectify these errors.

## 2019-08-27 ENCOUNTER — OFFICE VISIT (OUTPATIENT)
Dept: ONCOLOGY | Facility: CLINIC | Age: 64
End: 2019-08-27

## 2019-08-27 ENCOUNTER — OFFICE VISIT (OUTPATIENT)
Dept: LAB | Facility: HOSPITAL | Age: 64
End: 2019-08-27

## 2019-08-27 VITALS
HEART RATE: 71 BPM | WEIGHT: 182.2 LBS | SYSTOLIC BLOOD PRESSURE: 111 MMHG | DIASTOLIC BLOOD PRESSURE: 58 MMHG | HEIGHT: 65 IN | RESPIRATION RATE: 18 BRPM | BODY MASS INDEX: 30.35 KG/M2 | TEMPERATURE: 98.5 F

## 2019-08-27 DIAGNOSIS — Z51.12 ENCOUNTER FOR MONOCLONAL ANTIBODY TREATMENT FOR MALIGNANCY: ICD-10-CM

## 2019-08-27 DIAGNOSIS — T45.1X5A PANCYTOPENIA DUE TO ANTINEOPLASTIC CHEMOTHERAPY (HCC): ICD-10-CM

## 2019-08-27 DIAGNOSIS — E83.42 HYPOMAGNESEMIA: ICD-10-CM

## 2019-08-27 DIAGNOSIS — D50.8 OTHER IRON DEFICIENCY ANEMIA: ICD-10-CM

## 2019-08-27 DIAGNOSIS — C56.1 MALIGNANT NEOPLASM OF RIGHT OVARY (HCC): ICD-10-CM

## 2019-08-27 DIAGNOSIS — Z79.01 LONG TERM (CURRENT) USE OF ANTICOAGULANTS: ICD-10-CM

## 2019-08-27 DIAGNOSIS — I82.722 CHRONIC DEEP VEIN THROMBOSIS (DVT) OF LEFT UPPER EXTREMITY, UNSPECIFIED VEIN (HCC): Primary | ICD-10-CM

## 2019-08-27 DIAGNOSIS — D61.810 PANCYTOPENIA DUE TO ANTINEOPLASTIC CHEMOTHERAPY (HCC): ICD-10-CM

## 2019-08-27 DIAGNOSIS — K90.49 MALABSORPTION DUE TO INTOLERANCE, NOT ELSEWHERE CLASSIFIED: ICD-10-CM

## 2019-08-27 DIAGNOSIS — D51.0 PERNICIOUS ANEMIA: ICD-10-CM

## 2019-08-27 DIAGNOSIS — Z51.11 ENCOUNTER FOR ANTINEOPLASTIC CHEMOTHERAPY: ICD-10-CM

## 2019-08-27 LAB
BASOPHILS # BLD AUTO: 0.03 10*3/MM3 (ref 0–0.2)
BASOPHILS NFR BLD AUTO: 0.6 % (ref 0–1.5)
DEPRECATED RDW RBC AUTO: 58 FL (ref 37–54)
EOSINOPHIL # BLD AUTO: 0.06 10*3/MM3 (ref 0–0.4)
EOSINOPHIL NFR BLD AUTO: 1.3 % (ref 0.3–6.2)
ERYTHROCYTE [DISTWIDTH] IN BLOOD BY AUTOMATED COUNT: 15.4 % (ref 12.3–15.4)
FERRITIN SERPL-MCNC: 74 NG/ML (ref 11–307)
HCT VFR BLD AUTO: 30.8 % (ref 34–46.6)
HGB BLD-MCNC: 9.7 G/DL (ref 12–15.9)
IRON 24H UR-MRATE: 52 MCG/DL (ref 28–170)
IRON SATN MFR SERPL: 14 % (ref 15–50)
LYMPHOCYTES # BLD AUTO: 1.1 10*3/MM3 (ref 0.7–3.1)
LYMPHOCYTES NFR BLD AUTO: 23 % (ref 19.6–45.3)
MAGNESIUM SERPL-MCNC: 1.4 MG/DL (ref 1.8–2.5)
MCH RBC QN AUTO: 32.9 PG (ref 26.6–33)
MCHC RBC AUTO-ENTMCNC: 31.5 G/DL (ref 31.5–35.7)
MCV RBC AUTO: 104.4 FL (ref 79–97)
MONOCYTES # BLD AUTO: 0.79 10*3/MM3 (ref 0.1–0.9)
MONOCYTES NFR BLD AUTO: 16.5 % (ref 5–12)
NEUTROPHILS # BLD AUTO: 2.81 10*3/MM3 (ref 1.7–7)
NEUTROPHILS NFR BLD AUTO: 58.6 % (ref 42.7–76)
PLATELET # BLD AUTO: 168 10*3/MM3 (ref 140–450)
PMV BLD AUTO: 9.6 FL (ref 6–12)
RBC # BLD AUTO: 2.95 10*6/MM3 (ref 3.77–5.28)
TIBC SERPL-MCNC: 364 MCG/DL (ref 228–428)
TRANSFERRIN SERPL-MCNC: 260 MG/DL (ref 200–360)
WBC NRBC COR # BLD: 4.79 10*3/MM3 (ref 3.4–10.8)

## 2019-08-27 PROCEDURE — 85025 COMPLETE CBC W/AUTO DIFF WBC: CPT | Performed by: INTERNAL MEDICINE

## 2019-08-27 PROCEDURE — 83735 ASSAY OF MAGNESIUM: CPT | Performed by: NURSE PRACTITIONER

## 2019-08-27 PROCEDURE — 36415 COLL VENOUS BLD VENIPUNCTURE: CPT | Performed by: INTERNAL MEDICINE

## 2019-08-27 PROCEDURE — 84466 ASSAY OF TRANSFERRIN: CPT | Performed by: NURSE PRACTITIONER

## 2019-08-27 PROCEDURE — 83540 ASSAY OF IRON: CPT | Performed by: NURSE PRACTITIONER

## 2019-08-27 PROCEDURE — 82728 ASSAY OF FERRITIN: CPT | Performed by: NURSE PRACTITIONER

## 2019-08-27 PROCEDURE — 99215 OFFICE O/P EST HI 40 MIN: CPT | Performed by: INTERNAL MEDICINE

## 2019-08-27 RX ORDER — SODIUM CHLORIDE 9 MG/ML
250 INJECTION, SOLUTION INTRAVENOUS ONCE
Status: CANCELLED | OUTPATIENT
Start: 2019-08-30

## 2019-08-27 RX ORDER — SODIUM CHLORIDE 9 MG/ML
250 INJECTION, SOLUTION INTRAVENOUS ONCE
Status: CANCELLED | OUTPATIENT
Start: 2019-09-06

## 2019-08-27 NOTE — PROGRESS NOTES
Iron studies do not meet requirements to start Procrit.  Patient has intolerance to oral iron.  Injectafer ordered and will be given weekly x2 doses.  Please notify patient then forward for scheduling. (message sent to clinical pool).

## 2019-08-29 DIAGNOSIS — C56.1 MALIGNANT NEOPLASM OF RIGHT OVARY (HCC): ICD-10-CM

## 2019-08-29 RX ORDER — TEMAZEPAM 30 MG/1
30 CAPSULE ORAL NIGHTLY PRN
Qty: 30 CAPSULE | Refills: 0 | Status: SHIPPED | OUTPATIENT
Start: 2019-08-29 | End: 2019-10-01 | Stop reason: SDUPTHER

## 2019-08-29 RX ORDER — DIPHENHYDRAMINE HYDROCHLORIDE 50 MG/ML
50 INJECTION INTRAMUSCULAR; INTRAVENOUS AS NEEDED
Status: CANCELLED | OUTPATIENT
Start: 2019-08-30

## 2019-08-29 RX ORDER — DEXTROSE MONOHYDRATE 50 MG/ML
250 INJECTION, SOLUTION INTRAVENOUS ONCE
Status: CANCELLED | OUTPATIENT
Start: 2019-08-30

## 2019-08-29 RX ORDER — FAMOTIDINE 10 MG/ML
20 INJECTION, SOLUTION INTRAVENOUS AS NEEDED
Status: CANCELLED | OUTPATIENT
Start: 2019-08-30

## 2019-08-29 RX ORDER — PALONOSETRON 0.05 MG/ML
0.25 INJECTION, SOLUTION INTRAVENOUS ONCE
Status: CANCELLED | OUTPATIENT
Start: 2019-08-30

## 2019-08-29 NOTE — TELEPHONE ENCOUNTER
From: Tahira Zimmerman  To: Noemy Queen MD  Sent: 8/19/2019 11:40 AM EDT  Subject: Prescription Question    [facility] in [place] called and said that the insurance won’t pay for Temazapam 2 / 15 mg tablets at bedtime, they said that you would have to write a prescription for 30 mg at bedtime. I’m so sorry for the hassle, but could you resend it so the insurance will pay for it?

## 2019-08-30 ENCOUNTER — HOSPITAL ENCOUNTER (EMERGENCY)
Facility: HOSPITAL | Age: 64
Discharge: HOME | End: 2019-08-30
Attending: EMERGENCY MEDICINE | Admitting: EMERGENCY MEDICINE

## 2019-08-30 ENCOUNTER — HOSPITAL ENCOUNTER (OUTPATIENT)
Dept: ONCOLOGY | Facility: HOSPITAL | Age: 64
Setting detail: INFUSION SERIES
Discharge: HOME | End: 2019-08-30

## 2019-08-30 ENCOUNTER — APPOINTMENT (OUTPATIENT)
Dept: CT IMAGING | Facility: HOSPITAL | Age: 64
End: 2019-08-30

## 2019-08-30 ENCOUNTER — APPOINTMENT (OUTPATIENT)
Dept: GENERAL RADIOLOGY | Facility: HOSPITAL | Age: 64
End: 2019-08-30

## 2019-08-30 VITALS
HEART RATE: 53 BPM | OXYGEN SATURATION: 98 % | RESPIRATION RATE: 15 BRPM | WEIGHT: 182.98 LBS | BODY MASS INDEX: 27.73 KG/M2 | HEIGHT: 68 IN | SYSTOLIC BLOOD PRESSURE: 134 MMHG | DIASTOLIC BLOOD PRESSURE: 65 MMHG | TEMPERATURE: 97.9 F

## 2019-08-30 VITALS
HEART RATE: 48 BPM | DIASTOLIC BLOOD PRESSURE: 89 MMHG | BODY MASS INDEX: 29.82 KG/M2 | HEIGHT: 65 IN | RESPIRATION RATE: 18 BRPM | WEIGHT: 179 LBS | SYSTOLIC BLOOD PRESSURE: 165 MMHG | TEMPERATURE: 97.9 F

## 2019-08-30 DIAGNOSIS — T45.511A COUMADIN TOXICITY, ACCIDENTAL OR UNINTENTIONAL, INITIAL ENCOUNTER: Primary | ICD-10-CM

## 2019-08-30 DIAGNOSIS — E83.42 HYPOMAGNESEMIA: ICD-10-CM

## 2019-08-30 DIAGNOSIS — D50.8 OTHER IRON DEFICIENCY ANEMIA: ICD-10-CM

## 2019-08-30 DIAGNOSIS — R42 DIZZINESS: ICD-10-CM

## 2019-08-30 DIAGNOSIS — C56.1 MALIGNANT NEOPLASM OF RIGHT OVARY (HCC): Primary | ICD-10-CM

## 2019-08-30 LAB
ALBUMIN SERPL-MCNC: 3.5 G/DL (ref 3.5–4.8)
ALBUMIN/GLOB SERPL: 1.3 G/DL (ref 1–1.7)
ALP SERPL-CCNC: 99 U/L (ref 32–91)
ALT SERPL W P-5'-P-CCNC: 13 U/L (ref 14–54)
ANION GAP SERPL CALCULATED.3IONS-SCNC: 12.7 MMOL/L (ref 5–15)
ANION GAP SERPL CALCULATED.3IONS-SCNC: 15.8 MMOL/L (ref 5–15)
AST SERPL-CCNC: 25 U/L (ref 15–41)
BASOPHILS # BLD AUTO: 0 10*3/MM3 (ref 0–0.2)
BASOPHILS # BLD AUTO: 0.02 10*3/MM3 (ref 0–0.2)
BASOPHILS NFR BLD AUTO: 0.2 % (ref 0–1.5)
BASOPHILS NFR BLD AUTO: 0.4 % (ref 0–1.5)
BILIRUB SERPL-MCNC: 0.3 MG/DL (ref 0.3–1.2)
BUN BLD-MCNC: 12 MG/DL (ref 8–20)
BUN BLD-MCNC: 15 MG/DL (ref 8–20)
BUN/CREAT SERPL: 16.7 (ref 5.4–26.2)
BUN/CREAT SERPL: 20 (ref 5.4–26.2)
CALCIUM SPEC-SCNC: 8.8 MG/DL (ref 8.9–10.3)
CALCIUM SPEC-SCNC: 9 MG/DL (ref 8.9–10.3)
CHLORIDE SERPL-SCNC: 105 MMOL/L (ref 101–111)
CHLORIDE SERPL-SCNC: 105 MMOL/L (ref 101–111)
CO2 SERPL-SCNC: 22 MMOL/L (ref 22–32)
CO2 SERPL-SCNC: 23 MMOL/L (ref 22–32)
CREAT BLD-MCNC: 0.6 MG/DL (ref 0.4–1)
CREAT BLD-MCNC: 0.9 MG/DL (ref 0.4–1)
CREAT BLDA-MCNC: 0.8 MG/DL (ref 0.6–1.3)
DEPRECATED RDW RBC AUTO: 59.2 FL (ref 37–54)
DEPRECATED RDW RBC AUTO: 59.9 FL (ref 37–54)
EOSINOPHIL # BLD AUTO: 0 10*3/MM3 (ref 0–0.4)
EOSINOPHIL # BLD AUTO: 0.06 10*3/MM3 (ref 0–0.4)
EOSINOPHIL NFR BLD AUTO: 0.2 % (ref 0.3–6.2)
EOSINOPHIL NFR BLD AUTO: 1.2 % (ref 0.3–6.2)
ERYTHROCYTE [DISTWIDTH] IN BLOOD BY AUTOMATED COUNT: 15.7 % (ref 12.3–15.4)
ERYTHROCYTE [DISTWIDTH] IN BLOOD BY AUTOMATED COUNT: 16.9 % (ref 12.3–15.4)
GFR SERPL CREATININE-BSD FRML MDRD: 101 ML/MIN/1.73
GFR SERPL CREATININE-BSD FRML MDRD: 63 ML/MIN/1.73
GLOBULIN UR ELPH-MCNC: 2.7 GM/DL (ref 2.5–3.8)
GLUCOSE BLD-MCNC: 118 MG/DL (ref 65–99)
GLUCOSE BLD-MCNC: 97 MG/DL (ref 65–99)
HCT VFR BLD AUTO: 31.5 % (ref 34–46.6)
HCT VFR BLD AUTO: 31.9 % (ref 34–46.6)
HGB BLD-MCNC: 10.2 G/DL (ref 12–15.9)
HGB BLD-MCNC: 10.8 G/DL (ref 12–15.9)
INR PPP: 4.51 (ref 2–3)
LYMPHOCYTES # BLD AUTO: 0.4 10*3/MM3 (ref 0.7–3.1)
LYMPHOCYTES # BLD AUTO: 1.29 10*3/MM3 (ref 0.7–3.1)
LYMPHOCYTES NFR BLD AUTO: 25.2 % (ref 19.6–45.3)
LYMPHOCYTES NFR BLD AUTO: 9.3 % (ref 19.6–45.3)
MAGNESIUM SERPL-MCNC: 1.4 MG/DL (ref 1.8–2.5)
MCH RBC QN AUTO: 34.2 PG (ref 26.6–33)
MCH RBC QN AUTO: 34.4 PG (ref 26.6–33)
MCHC RBC AUTO-ENTMCNC: 32.4 G/DL (ref 31.5–35.7)
MCHC RBC AUTO-ENTMCNC: 34 G/DL (ref 31.5–35.7)
MCV RBC AUTO: 101.2 FL (ref 79–97)
MCV RBC AUTO: 105.7 FL (ref 79–97)
MONOCYTES # BLD AUTO: 0.1 10*3/MM3 (ref 0.1–0.9)
MONOCYTES # BLD AUTO: 0.86 10*3/MM3 (ref 0.1–0.9)
MONOCYTES NFR BLD AUTO: 16.8 % (ref 5–12)
MONOCYTES NFR BLD AUTO: 2.7 % (ref 5–12)
NEUTROPHILS # BLD AUTO: 2.88 10*3/MM3 (ref 1.7–7)
NEUTROPHILS # BLD AUTO: 3.8 10*3/MM3 (ref 1.7–7)
NEUTROPHILS NFR BLD AUTO: 56.4 % (ref 42.7–76)
NEUTROPHILS NFR BLD AUTO: 87.6 % (ref 42.7–76)
NRBC BLD AUTO-RTO: 0.1 /100 WBC (ref 0–0.2)
PLATELET # BLD AUTO: 196 10*3/MM3 (ref 140–450)
PLATELET # BLD AUTO: 196 10*3/MM3 (ref 140–450)
PMV BLD AUTO: 10.4 FL (ref 6–12)
PMV BLD AUTO: 8.6 FL (ref 6–12)
POTASSIUM BLD-SCNC: 3.7 MMOL/L (ref 3.6–5.1)
POTASSIUM BLD-SCNC: 3.8 MMOL/L (ref 3.6–5.1)
PROT SERPL-MCNC: 6.2 G/DL (ref 6.1–7.9)
PROTHROMBIN TIME: 44.3 SECONDS (ref 19.4–28.5)
RBC # BLD AUTO: 2.98 10*6/MM3 (ref 3.77–5.28)
RBC # BLD AUTO: 3.15 10*6/MM3 (ref 3.77–5.28)
SODIUM BLD-SCNC: 136 MMOL/L (ref 136–144)
SODIUM BLD-SCNC: 140 MMOL/L (ref 136–144)
TROPONIN I SERPL-MCNC: <0.03 NG/ML (ref 0–0.03)
WBC NRBC COR # BLD: 4.3 10*3/MM3 (ref 3.4–10.8)
WBC NRBC COR # BLD: 5.11 10*3/MM3 (ref 3.4–10.8)

## 2019-08-30 PROCEDURE — 85610 PROTHROMBIN TIME: CPT | Performed by: PHYSICIAN ASSISTANT

## 2019-08-30 PROCEDURE — 25010000002 VITAMIN K1 PER 1 MG

## 2019-08-30 PROCEDURE — 25010000002 PEGFILGRASTIM 6 MG/0.6ML PREFILLED SYRINGE KIT: Performed by: INTERNAL MEDICINE

## 2019-08-30 PROCEDURE — 85025 COMPLETE CBC W/AUTO DIFF WBC: CPT | Performed by: NURSE PRACTITIONER

## 2019-08-30 PROCEDURE — 96377 APPLICATON ON-BODY INJECTOR: CPT | Performed by: INTERNAL MEDICINE

## 2019-08-30 PROCEDURE — 82565 ASSAY OF CREATININE: CPT

## 2019-08-30 PROCEDURE — 96417 CHEMO IV INFUS EACH ADDL SEQ: CPT | Performed by: INTERNAL MEDICINE

## 2019-08-30 PROCEDURE — 99284 EMERGENCY DEPT VISIT MOD MDM: CPT

## 2019-08-30 PROCEDURE — 84484 ASSAY OF TROPONIN QUANT: CPT | Performed by: PHYSICIAN ASSISTANT

## 2019-08-30 PROCEDURE — 25010000002 DOXORUBICIN LIPOSOMAL PER 10 MG: Performed by: NURSE PRACTITIONER

## 2019-08-30 PROCEDURE — 96375 TX/PRO/DX INJ NEW DRUG ADDON: CPT | Performed by: INTERNAL MEDICINE

## 2019-08-30 PROCEDURE — 83735 ASSAY OF MAGNESIUM: CPT | Performed by: NURSE PRACTITIONER

## 2019-08-30 PROCEDURE — 25010000002 FERRIC CARBOXYMALTOSE 750 MG/15ML SOLUTION 15 ML VIAL: Performed by: INTERNAL MEDICINE

## 2019-08-30 PROCEDURE — 70450 CT HEAD/BRAIN W/O DYE: CPT

## 2019-08-30 PROCEDURE — 25010000002 CARBOPLATIN PER 50 MG: Performed by: NURSE PRACTITIONER

## 2019-08-30 PROCEDURE — 96413 CHEMO IV INFUSION 1 HR: CPT | Performed by: INTERNAL MEDICINE

## 2019-08-30 PROCEDURE — 25010000002 DEXAMETHASONE SODIUM PHOSPHATE 120 MG/30ML SOLUTION: Performed by: NURSE PRACTITIONER

## 2019-08-30 PROCEDURE — 71045 X-RAY EXAM CHEST 1 VIEW: CPT

## 2019-08-30 PROCEDURE — 25010000002 PALONOSETRON 0.25 MG/5ML SOLUTION PREFILLED SYRINGE: Performed by: NURSE PRACTITIONER

## 2019-08-30 PROCEDURE — 25010000002 MAGNESIUM SULFATE 2 GM/50ML SOLUTION: Performed by: NURSE PRACTITIONER

## 2019-08-30 PROCEDURE — 96367 TX/PROPH/DG ADDL SEQ IV INF: CPT | Performed by: INTERNAL MEDICINE

## 2019-08-30 PROCEDURE — 85025 COMPLETE CBC W/AUTO DIFF WBC: CPT | Performed by: PHYSICIAN ASSISTANT

## 2019-08-30 PROCEDURE — 93005 ELECTROCARDIOGRAM TRACING: CPT | Performed by: PHYSICIAN ASSISTANT

## 2019-08-30 PROCEDURE — 25010000002 FOSAPREPITANT PER 1 MG: Performed by: NURSE PRACTITIONER

## 2019-08-30 PROCEDURE — 80053 COMPREHEN METABOLIC PANEL: CPT | Performed by: NURSE PRACTITIONER

## 2019-08-30 RX ORDER — HEPARIN SODIUM 5000 [USP'U]/ML
INJECTION, SOLUTION INTRAVENOUS; SUBCUTANEOUS
Status: DISCONTINUED
Start: 2019-08-30 | End: 2019-08-30 | Stop reason: WASHOUT

## 2019-08-30 RX ORDER — PROMETHAZINE HYDROCHLORIDE 25 MG/1
25 TABLET ORAL EVERY 6 HOURS PRN
Qty: 12 TABLET | Refills: 0 | Status: SHIPPED | OUTPATIENT
Start: 2019-08-30 | End: 2020-04-07

## 2019-08-30 RX ORDER — MAGNESIUM SULFATE HEPTAHYDRATE 40 MG/ML
2 INJECTION, SOLUTION INTRAVENOUS ONCE
Status: COMPLETED | OUTPATIENT
Start: 2019-08-30 | End: 2019-08-30

## 2019-08-30 RX ORDER — PHYTONADIONE 10 MG/ML
INJECTION, EMULSION INTRAMUSCULAR; INTRAVENOUS; SUBCUTANEOUS
Status: COMPLETED
Start: 2019-08-30 | End: 2019-08-30

## 2019-08-30 RX ORDER — SODIUM CHLORIDE 9 MG/ML
250 INJECTION, SOLUTION INTRAVENOUS ONCE
Status: CANCELLED | OUTPATIENT
Start: 2019-09-13

## 2019-08-30 RX ORDER — SODIUM CHLORIDE 9 MG/ML
250 INJECTION, SOLUTION INTRAVENOUS ONCE
Status: CANCELLED | OUTPATIENT
Start: 2019-09-20

## 2019-08-30 RX ORDER — MAGNESIUM SULFATE HEPTAHYDRATE 40 MG/ML
2 INJECTION, SOLUTION INTRAVENOUS ONCE
Status: CANCELLED | OUTPATIENT
Start: 2019-09-13

## 2019-08-30 RX ORDER — MAGNESIUM SULFATE HEPTAHYDRATE 40 MG/ML
2 INJECTION, SOLUTION INTRAVENOUS ONCE
Status: CANCELLED | OUTPATIENT
Start: 2019-09-06

## 2019-08-30 RX ORDER — SODIUM CHLORIDE 0.9 % (FLUSH) 0.9 %
10 SYRINGE (ML) INJECTION AS NEEDED
Status: CANCELLED | OUTPATIENT
Start: 2019-08-30

## 2019-08-30 RX ORDER — SODIUM CHLORIDE 0.9 % (FLUSH) 0.9 %
10 SYRINGE (ML) INJECTION AS NEEDED
Status: DISCONTINUED | OUTPATIENT
Start: 2019-08-30 | End: 2019-08-31 | Stop reason: HOSPADM

## 2019-08-30 RX ORDER — SODIUM CHLORIDE 0.9 % (FLUSH) 0.9 %
10 SYRINGE (ML) INJECTION AS NEEDED
Status: DISCONTINUED | OUTPATIENT
Start: 2019-08-30 | End: 2019-08-30 | Stop reason: HOSPADM

## 2019-08-30 RX ORDER — SODIUM CHLORIDE 9 MG/ML
250 INJECTION, SOLUTION INTRAVENOUS ONCE
Status: CANCELLED | OUTPATIENT
Start: 2019-09-06

## 2019-08-30 RX ORDER — PALONOSETRON HYDROCHLORIDE 0.05 MG/ML
0.25 INJECTION, SOLUTION INTRAVENOUS ONCE
Status: COMPLETED | OUTPATIENT
Start: 2019-08-30 | End: 2019-08-30

## 2019-08-30 RX ORDER — MAGNESIUM SULFATE HEPTAHYDRATE 40 MG/ML
2 INJECTION, SOLUTION INTRAVENOUS ONCE
Status: CANCELLED | OUTPATIENT
Start: 2019-09-20

## 2019-08-30 RX ADMIN — CARBOPLATIN 580 MG: 10 INJECTION, SOLUTION INTRAVENOUS at 14:19

## 2019-08-30 RX ADMIN — DEXAMETHASONE SODIUM PHOSPHATE 8 MG: 4 INJECTION, SOLUTION INTRA-ARTICULAR; INTRALESIONAL; INTRAMUSCULAR; INTRAVENOUS; SOFT TISSUE at 12:48

## 2019-08-30 RX ADMIN — PALONOSETRON 0.25 MG: 0.25 INJECTION, SOLUTION INTRAVENOUS at 12:11

## 2019-08-30 RX ADMIN — HEPARIN: 100 SYRINGE at 19:40

## 2019-08-30 RX ADMIN — Medication 10 ML: at 15:27

## 2019-08-30 RX ADMIN — PEGFILGRASTIM 6 MG: KIT SUBCUTANEOUS at 15:04

## 2019-08-30 RX ADMIN — PHYTONADIONE 5 MG: 10 INJECTION, EMULSION INTRAMUSCULAR; INTRAVENOUS; SUBCUTANEOUS at 19:02

## 2019-08-30 RX ADMIN — DOXORUBICIN HYDROCHLORIDE 56 MG: 2 INJECTABLE, LIPOSOMAL INTRAVENOUS at 13:08

## 2019-08-30 RX ADMIN — FERRIC CARBOXYMALTOSE INJECTION 750 MG: 50 INJECTION, SOLUTION INTRAVENOUS at 15:02

## 2019-08-30 RX ADMIN — SODIUM CHLORIDE 100 ML: 900 INJECTION, SOLUTION INTRAVENOUS at 12:13

## 2019-08-30 RX ADMIN — MAGNESIUM SULFATE HEPTAHYDRATE 2 G: 40 INJECTION, SOLUTION INTRAVENOUS at 11:12

## 2019-08-30 NOTE — PROGRESS NOTES
"At the end of injectafer infusion patient HR was 48 and patient complains of feeling \"a little dizzy\" Patient denies any chest pain or SOA. Patient skin color normal and not diaphoretic. Notified Glenna Cadena NP and patient advised to go to ER to be evaluated. Patient chose to drive per private vehicle and stable on discharge. Patient did not have anyone to call and did not want to go by EMS. Infusaport needle left in place and report called to G[place]na in ER.   "

## 2019-08-30 NOTE — PROGRESS NOTES
Patient to office today for chemo, iv magnesium, and injectafer. Patient has no complaints but when asked about any rash especially in high friction areas patient claims that she does have an irritation under right breast where her bra rubs her skin. Rash is minimal and patient states that it does not bother her. Patient advised to go without bra as much as possible.Patient v/u. Also informed patient to notify nursing if it worsens.

## 2019-09-02 ENCOUNTER — HOSPITAL ENCOUNTER (EMERGENCY)
Facility: HOSPITAL | Age: 64
Discharge: HOME | End: 2019-09-02
Admitting: EMERGENCY MEDICINE

## 2019-09-02 VITALS
SYSTOLIC BLOOD PRESSURE: 103 MMHG | OXYGEN SATURATION: 98 % | HEART RATE: 69 BPM | WEIGHT: 180.12 LBS | DIASTOLIC BLOOD PRESSURE: 66 MMHG | BODY MASS INDEX: 30.01 KG/M2 | TEMPERATURE: 99 F | HEIGHT: 65 IN | RESPIRATION RATE: 15 BRPM

## 2019-09-02 DIAGNOSIS — T45.511S: Primary | ICD-10-CM

## 2019-09-02 LAB
INR PPP: 1.13 (ref 2–3)
PROTHROMBIN TIME: 11.4 SECONDS (ref 19.4–28.5)

## 2019-09-02 PROCEDURE — 99283 EMERGENCY DEPT VISIT LOW MDM: CPT

## 2019-09-02 PROCEDURE — 85610 PROTHROMBIN TIME: CPT | Performed by: NURSE PRACTITIONER

## 2019-09-02 NOTE — ED PROVIDER NOTES
Subjective   Chief Complaint: Need INR checked  Context: Patient reports she is here to have her INR checked.  She reports that she was in the ER on Friday and her INR was elevated and she has not taken her Coumadin since.  She reports the ER doctor advised her to come to the ER on Monday to have her INR rechecked.            History provided by:  Patient      Review of Systems   Constitutional: Negative for chills and fever.   Respiratory: Negative for shortness of breath.    Cardiovascular: Negative for chest pain.   Hematological: Does not bruise/bleed easily.       Past Medical History:   Diagnosis Date   • Hypertension    • Osteoporosis    • Ovarian cancer (CMS/HCC)        Allergies   Allergen Reactions   • Morphine Nausea Only and Hives   • Penicillin V Potassium Rash   • Sulfa Antibiotics Rash   • Levaquin [Levofloxacin] Nausea Only and Dizziness     When taken with Coumadin   • Amoxicillin Rash   • Norco [Hydrocodone-Acetaminophen] Rash       Past Surgical History:   Procedure Laterality Date   • COLON SURGERY     • COLONOSCOPY     • EXPLORATORY LAPAROTOMY     • HERNIA REPAIR     • HYSTERECTOMY     • OOPHORECTOMY         Family History   Problem Relation Age of Onset   • Hypertension Mother    • Cancer Father    • Hypertension Father    • Hypertension Sister    • Hypertension Brother    • Stroke Brother        Social History     Socioeconomic History   • Marital status:      Spouse name: Not on file   • Number of children: Not on file   • Years of education: Not on file   • Highest education level: Not on file   Tobacco Use   • Smoking status: Never Smoker   • Smokeless tobacco: Never Used   Substance and Sexual Activity   • Alcohol use: No     Frequency: Never   • Drug use: No   • Sexual activity: Defer           Objective   Physical Exam  The patient is well-developed, well-nourished, alert, cooperative and in no acute distress.    HEENT exam is normocephalic and atraumatic. Pupils equal ,round,  "reactive to light. Extraocular muscles are intact.    Lungs clear to auscultation bilaterally.    Cardiovascular. Regular rate and rhythm without murmur.    Neurologic: Oriented x3 without focal neurological deficits.    Skin shows no rash, petechiae, or purpura.    Procedures           ED Course      Labs Reviewed   PROTIME-INR - Abnormal; Notable for the following components:       Result Value    Protime 11.4 (*)     INR 1.13 (*)     All other components within normal limits     No radiology results for the last day  Medications - No data to display  /68 (BP Location: Left arm, Patient Position: Sitting)   Pulse 69   Temp 98.4 °F (36.9 °C) (Oral)   Resp 16   Ht 165.1 cm (65\")   Wt 81.7 kg (180 lb 1.9 oz)   SpO2 97%   BMI 29.97 kg/m²               MDM  Number of Diagnoses or Management Options  Warfarin toxicity, accidental or unintentional, sequela:   Diagnosis management comments: MEDICAL DECISION  Comorbidities: Noted in past history  Differentials: Mood and toxicity, subtherapeutic Coumadin level.; this list is not all inclusive and does not constitute the entirety of considered causes    Lab interpretation:  Labs viewed by me significant for, INR 1.13 today.  Results and plan for discharge were discussed.           Amount and/or Complexity of Data Reviewed  Clinical lab tests: reviewed and ordered          Final diagnoses:   Warfarin toxicity, accidental or unintentional, sequela            Vincent Lester APRN  09/02/19 1124    "

## 2019-09-04 ENCOUNTER — TELEPHONE (OUTPATIENT)
Dept: ONCOLOGY | Facility: CLINIC | Age: 64
End: 2019-09-04

## 2019-09-04 ENCOUNTER — HOSPITAL ENCOUNTER (OUTPATIENT)
Dept: ONCOLOGY | Facility: HOSPITAL | Age: 64
Discharge: HOME | End: 2019-09-04
Admitting: INTERNAL MEDICINE

## 2019-09-04 VITALS
OXYGEN SATURATION: 98 % | HEART RATE: 77 BPM | SYSTOLIC BLOOD PRESSURE: 114 MMHG | BODY MASS INDEX: 29.66 KG/M2 | HEIGHT: 65 IN | RESPIRATION RATE: 18 BRPM | WEIGHT: 178 LBS | TEMPERATURE: 98.4 F | DIASTOLIC BLOOD PRESSURE: 74 MMHG

## 2019-09-04 DIAGNOSIS — R05.9 COUGH: Primary | ICD-10-CM

## 2019-09-04 DIAGNOSIS — Z51.11 ENCOUNTER FOR ANTINEOPLASTIC CHEMOTHERAPY: ICD-10-CM

## 2019-09-04 DIAGNOSIS — C56.1 MALIGNANT NEOPLASM OF RIGHT OVARY (HCC): ICD-10-CM

## 2019-09-04 LAB
BASOPHILS # BLD AUTO: 0.01 10*3/MM3 (ref 0–0.2)
BASOPHILS NFR BLD AUTO: 0.1 % (ref 0–1.5)
DEPRECATED RDW RBC AUTO: 56.8 FL (ref 37–54)
EOSINOPHIL # BLD AUTO: 0.09 10*3/MM3 (ref 0–0.4)
EOSINOPHIL NFR BLD AUTO: 0.5 % (ref 0.3–6.2)
ERYTHROCYTE [DISTWIDTH] IN BLOOD BY AUTOMATED COUNT: 15.2 % (ref 12.3–15.4)
HCT VFR BLD AUTO: 31.8 % (ref 34–46.6)
HGB BLD-MCNC: 10.1 G/DL (ref 12–15.9)
LYMPHOCYTES # BLD AUTO: 1.94 10*3/MM3 (ref 0.7–3.1)
LYMPHOCYTES NFR BLD AUTO: 10.6 % (ref 19.6–45.3)
MCH RBC QN AUTO: 33 PG (ref 26.6–33)
MCHC RBC AUTO-ENTMCNC: 31.8 G/DL (ref 31.5–35.7)
MCV RBC AUTO: 103.9 FL (ref 79–97)
MONOCYTES # BLD AUTO: 1.98 10*3/MM3 (ref 0.1–0.9)
MONOCYTES NFR BLD AUTO: 10.8 % (ref 5–12)
NEUTROPHILS # BLD AUTO: 14.29 10*3/MM3 (ref 1.7–7)
NEUTROPHILS NFR BLD AUTO: 78 % (ref 42.7–76)
PLATELET # BLD AUTO: 150 10*3/MM3 (ref 140–450)
PMV BLD AUTO: 10.4 FL (ref 6–12)
RBC # BLD AUTO: 3.06 10*6/MM3 (ref 3.77–5.28)
WBC NRBC COR # BLD: 18.31 10*3/MM3 (ref 3.4–10.8)

## 2019-09-04 PROCEDURE — 85025 COMPLETE CBC W/AUTO DIFF WBC: CPT | Performed by: INTERNAL MEDICINE

## 2019-09-04 PROCEDURE — 94760 N-INVAS EAR/PLS OXIMETRY 1: CPT | Performed by: INTERNAL MEDICINE

## 2019-09-04 PROCEDURE — 36415 COLL VENOUS BLD VENIPUNCTURE: CPT | Performed by: INTERNAL MEDICINE

## 2019-09-04 RX ORDER — AZITHROMYCIN 250 MG/1
TABLET, FILM COATED ORAL
Qty: 6 TABLET | Refills: 0 | Status: SHIPPED | OUTPATIENT
Start: 2019-09-04 | End: 2019-10-11

## 2019-09-04 NOTE — TELEPHONE ENCOUNTER
Patient states she is having chest congestion with yellow/green phlegm production.  No fever.  She states she feels really bad.  Glenna Cadena NP notified and states patient is to come in today for nurse assessment.  Patient notified and v/johanne Montalvo

## 2019-09-06 ENCOUNTER — HOSPITAL ENCOUNTER (OUTPATIENT)
Dept: ONCOLOGY | Facility: HOSPITAL | Age: 64
Setting detail: INFUSION SERIES
Discharge: HOME | End: 2019-09-06

## 2019-09-06 VITALS
RESPIRATION RATE: 18 BRPM | BODY MASS INDEX: 29.82 KG/M2 | TEMPERATURE: 98.1 F | DIASTOLIC BLOOD PRESSURE: 68 MMHG | HEIGHT: 65 IN | SYSTOLIC BLOOD PRESSURE: 116 MMHG | HEART RATE: 70 BPM | WEIGHT: 179 LBS

## 2019-09-06 DIAGNOSIS — D51.0 PERNICIOUS ANEMIA: ICD-10-CM

## 2019-09-06 DIAGNOSIS — D50.8 OTHER IRON DEFICIENCY ANEMIA: ICD-10-CM

## 2019-09-06 DIAGNOSIS — T45.1X5A PANCYTOPENIA DUE TO ANTINEOPLASTIC CHEMOTHERAPY (HCC): ICD-10-CM

## 2019-09-06 DIAGNOSIS — E83.42 HYPOMAGNESEMIA: ICD-10-CM

## 2019-09-06 DIAGNOSIS — D61.810 PANCYTOPENIA DUE TO ANTINEOPLASTIC CHEMOTHERAPY (HCC): ICD-10-CM

## 2019-09-06 DIAGNOSIS — C56.1 MALIGNANT NEOPLASM OF RIGHT OVARY (HCC): Primary | ICD-10-CM

## 2019-09-06 LAB
ALBUMIN SERPL-MCNC: 3.4 G/DL (ref 3.5–4.8)
ALBUMIN/GLOB SERPL: 1.4 G/DL (ref 1–1.7)
ALP SERPL-CCNC: 135 U/L (ref 32–91)
ALT SERPL W P-5'-P-CCNC: 11 U/L (ref 14–54)
ANION GAP SERPL CALCULATED.3IONS-SCNC: 13.9 MMOL/L (ref 5–15)
AST SERPL-CCNC: 23 U/L (ref 15–41)
BASOPHILS # BLD AUTO: 0.01 10*3/MM3 (ref 0–0.2)
BASOPHILS NFR BLD AUTO: 0.1 % (ref 0–1.5)
BILIRUB SERPL-MCNC: 0.5 MG/DL (ref 0.3–1.2)
BUN BLD-MCNC: 16 MG/DL (ref 8–20)
BUN/CREAT SERPL: 20 (ref 5.4–26.2)
CALCIUM SPEC-SCNC: 8.6 MG/DL (ref 8.9–10.3)
CHLORIDE SERPL-SCNC: 103 MMOL/L (ref 101–111)
CO2 SERPL-SCNC: 24 MMOL/L (ref 22–32)
CREAT BLD-MCNC: 0.8 MG/DL (ref 0.4–1)
CREAT BLDA-MCNC: 0.5 MG/DL (ref 0.6–1.3)
DEPRECATED RDW RBC AUTO: 57.3 FL (ref 37–54)
EOSINOPHIL # BLD AUTO: 0.05 10*3/MM3 (ref 0–0.4)
EOSINOPHIL NFR BLD AUTO: 0.7 % (ref 0.3–6.2)
ERYTHROCYTE [DISTWIDTH] IN BLOOD BY AUTOMATED COUNT: 15.3 % (ref 12.3–15.4)
GFR SERPL CREATININE-BSD FRML MDRD: 72 ML/MIN/1.73
GLOBULIN UR ELPH-MCNC: 2.5 GM/DL (ref 2.5–3.8)
GLUCOSE BLD-MCNC: 98 MG/DL (ref 65–99)
HCT VFR BLD AUTO: 30.4 % (ref 34–46.6)
HGB BLD-MCNC: 9.8 G/DL (ref 12–15.9)
LYMPHOCYTES # BLD AUTO: 1.12 10*3/MM3 (ref 0.7–3.1)
LYMPHOCYTES NFR BLD AUTO: 14.9 % (ref 19.6–45.3)
MAGNESIUM SERPL-MCNC: 1.3 MG/DL (ref 1.8–2.5)
MCH RBC QN AUTO: 33.8 PG (ref 26.6–33)
MCHC RBC AUTO-ENTMCNC: 32.2 G/DL (ref 31.5–35.7)
MCV RBC AUTO: 104.8 FL (ref 79–97)
MONOCYTES # BLD AUTO: 0.72 10*3/MM3 (ref 0.1–0.9)
MONOCYTES NFR BLD AUTO: 9.6 % (ref 5–12)
NEUTROPHILS # BLD AUTO: 5.62 10*3/MM3 (ref 1.7–7)
NEUTROPHILS NFR BLD AUTO: 74.7 % (ref 42.7–76)
PLATELET # BLD AUTO: 114 10*3/MM3 (ref 140–450)
PMV BLD AUTO: 10.8 FL (ref 6–12)
POTASSIUM BLD-SCNC: 3.9 MMOL/L (ref 3.6–5.1)
PROT SERPL-MCNC: 5.9 G/DL (ref 6.1–7.9)
RBC # BLD AUTO: 2.9 10*6/MM3 (ref 3.77–5.28)
SODIUM BLD-SCNC: 137 MMOL/L (ref 136–144)
WBC NRBC COR # BLD: 7.52 10*3/MM3 (ref 3.4–10.8)

## 2019-09-06 PROCEDURE — 80053 COMPREHEN METABOLIC PANEL: CPT | Performed by: NURSE PRACTITIONER

## 2019-09-06 PROCEDURE — 82565 ASSAY OF CREATININE: CPT

## 2019-09-06 PROCEDURE — 25010000002 MAGNESIUM SULFATE 2 GM/50ML SOLUTION: Performed by: NURSE PRACTITIONER

## 2019-09-06 PROCEDURE — 96367 TX/PROPH/DG ADDL SEQ IV INF: CPT

## 2019-09-06 PROCEDURE — 25010000002 FERRIC CARBOXYMALTOSE 750 MG/15ML SOLUTION 15 ML VIAL: Performed by: NURSE PRACTITIONER

## 2019-09-06 PROCEDURE — 85025 COMPLETE CBC W/AUTO DIFF WBC: CPT | Performed by: NURSE PRACTITIONER

## 2019-09-06 PROCEDURE — 96365 THER/PROPH/DIAG IV INF INIT: CPT | Performed by: INTERNAL MEDICINE

## 2019-09-06 PROCEDURE — 83735 ASSAY OF MAGNESIUM: CPT | Performed by: NURSE PRACTITIONER

## 2019-09-06 RX ORDER — SODIUM CHLORIDE 0.9 % (FLUSH) 0.9 %
10 SYRINGE (ML) INJECTION AS NEEDED
Status: DISCONTINUED | OUTPATIENT
Start: 2019-09-06 | End: 2019-09-07 | Stop reason: HOSPADM

## 2019-09-06 RX ORDER — SODIUM CHLORIDE 0.9 % (FLUSH) 0.9 %
10 SYRINGE (ML) INJECTION AS NEEDED
Status: CANCELLED | OUTPATIENT
Start: 2019-09-06

## 2019-09-06 RX ORDER — MAGNESIUM SULFATE HEPTAHYDRATE 40 MG/ML
2 INJECTION, SOLUTION INTRAVENOUS ONCE
Status: COMPLETED | OUTPATIENT
Start: 2019-09-06 | End: 2019-09-06

## 2019-09-06 RX ADMIN — MAGNESIUM SULFATE HEPTAHYDRATE 2 G: 40 INJECTION, SOLUTION INTRAVENOUS at 10:53

## 2019-09-06 RX ADMIN — Medication 10 ML: at 12:31

## 2019-09-06 RX ADMIN — HEPARIN 500 UNITS: 100 SYRINGE at 12:31

## 2019-09-06 RX ADMIN — FERRIC CARBOXYMALTOSE INJECTION 750 MG: 50 INJECTION, SOLUTION INTRAVENOUS at 11:46

## 2019-09-12 ENCOUNTER — TELEPHONE (OUTPATIENT)
Dept: ONCOLOGY | Facility: HOSPITAL | Age: 64
End: 2019-09-12

## 2019-09-13 ENCOUNTER — APPOINTMENT (OUTPATIENT)
Dept: ONCOLOGY | Facility: HOSPITAL | Age: 64
End: 2019-09-13

## 2019-09-13 ENCOUNTER — HOSPITAL ENCOUNTER (OUTPATIENT)
Dept: ONCOLOGY | Facility: HOSPITAL | Age: 64
Setting detail: INFUSION SERIES
Discharge: HOME | End: 2019-09-13

## 2019-09-13 VITALS
RESPIRATION RATE: 18 BRPM | BODY MASS INDEX: 29.66 KG/M2 | HEIGHT: 65 IN | WEIGHT: 178 LBS | SYSTOLIC BLOOD PRESSURE: 112 MMHG | TEMPERATURE: 98.5 F | DIASTOLIC BLOOD PRESSURE: 71 MMHG | HEART RATE: 60 BPM

## 2019-09-13 DIAGNOSIS — E83.42 HYPOMAGNESEMIA: Primary | ICD-10-CM

## 2019-09-13 DIAGNOSIS — C56.1 MALIGNANT NEOPLASM OF RIGHT OVARY (HCC): ICD-10-CM

## 2019-09-13 PROCEDURE — 96365 THER/PROPH/DIAG IV INF INIT: CPT | Performed by: INTERNAL MEDICINE

## 2019-09-13 PROCEDURE — 25010000002 MAGNESIUM SULFATE 2 GM/50ML SOLUTION: Performed by: NURSE PRACTITIONER

## 2019-09-13 RX ORDER — SODIUM CHLORIDE 0.9 % (FLUSH) 0.9 %
10 SYRINGE (ML) INJECTION AS NEEDED
Status: CANCELLED | OUTPATIENT
Start: 2019-09-13

## 2019-09-13 RX ORDER — WARFARIN SODIUM 1 MG/1
TABLET ORAL
Qty: 45 TABLET | Refills: 2 | Status: SHIPPED | OUTPATIENT
Start: 2019-09-13 | End: 2019-11-08

## 2019-09-13 RX ORDER — MAGNESIUM SULFATE HEPTAHYDRATE 40 MG/ML
2 INJECTION, SOLUTION INTRAVENOUS ONCE
Status: COMPLETED | OUTPATIENT
Start: 2019-09-13 | End: 2019-09-13

## 2019-09-13 RX ORDER — SODIUM CHLORIDE 0.9 % (FLUSH) 0.9 %
10 SYRINGE (ML) INJECTION AS NEEDED
Status: DISCONTINUED | OUTPATIENT
Start: 2019-09-13 | End: 2019-09-14 | Stop reason: HOSPADM

## 2019-09-13 RX ADMIN — MAGNESIUM SULFATE HEPTAHYDRATE 2 G: 40 INJECTION, SOLUTION INTRAVENOUS at 09:45

## 2019-09-13 RX ADMIN — HEPARIN 500 UNITS: 100 SYRINGE at 10:50

## 2019-09-13 RX ADMIN — Medication 30 ML: at 10:50

## 2019-09-15 NOTE — PATIENT INSTRUCTIONS
"DASH Eating Plan  DASH stands for \"Dietary Approaches to Stop Hypertension.\" The DASH eating plan is a healthy eating plan that has been shown to reduce high blood pressure (hypertension). It may also reduce your risk for type 2 diabetes, heart disease, and stroke. The DASH eating plan may also help with weight loss.  What are tips for following this plan?    General guidelines  · Avoid eating more than 2,300 mg (milligrams) of salt (sodium) a day. If you have hypertension, you may need to reduce your sodium intake to 1,500 mg a day.  · Limit alcohol intake to no more than 1 drink a day for nonpregnant women and 2 drinks a day for men. One drink equals 12 oz of beer, 5 oz of wine, or 1½ oz of hard liquor.  · Work with your health care provider to maintain a healthy body weight or to lose weight. Ask what an ideal weight is for you.  · Get at least 30 minutes of exercise that causes your heart to beat faster (aerobic exercise) most days of the week. Activities may include walking, swimming, or biking.  · Work with your health care provider or diet and nutrition specialist (dietitian) to adjust your eating plan to your individual calorie needs.  Reading food labels    · Check food labels for the amount of sodium per serving. Choose foods with less than 5 percent of the Daily Value of sodium. Generally, foods with less than 300 mg of sodium per serving fit into this eating plan.  · To find whole grains, look for the word \"whole\" as the first word in the ingredient list.  Shopping  · Buy products labeled as \"low-sodium\" or \"no salt added.\"  · Buy fresh foods. Avoid canned foods and premade or frozen meals.  Cooking  · Avoid adding salt when cooking. Use salt-free seasonings or herbs instead of table salt or sea salt. Check with your health care provider or pharmacist before using salt substitutes.  · Do not muro foods. Cook foods using healthy methods such as baking, boiling, grilling, and broiling instead.  · Cook with " heart-healthy oils, such as olive, canola, soybean, or sunflower oil.  Meal planning  · Eat a balanced diet that includes:  ? 5 or more servings of fruits and vegetables each day. At each meal, try to fill half of your plate with fruits and vegetables.  ? Up to 6-8 servings of whole grains each day.  ? Less than 6 oz of lean meat, poultry, or fish each day. A 3-oz serving of meat is about the same size as a deck of cards. One egg equals 1 oz.  ? 2 servings of low-fat dairy each day.  ? A serving of nuts, seeds, or beans 5 times each week.  ? Heart-healthy fats. Healthy fats called Omega-3 fatty acids are found in foods such as flaxseeds and coldwater fish, like sardines, salmon, and mackerel.  · Limit how much you eat of the following:  ? Canned or prepackaged foods.  ? Food that is high in trans fat, such as fried foods.  ? Food that is high in saturated fat, such as fatty meat.  ? Sweets, desserts, sugary drinks, and other foods with added sugar.  ? Full-fat dairy products.  · Do not salt foods before eating.  · Try to eat at least 2 vegetarian meals each week.  · Eat more home-cooked food and less restaurant, buffet, and fast food.  · When eating at a restaurant, ask that your food be prepared with less salt or no salt, if possible.  What foods are recommended?  The items listed may not be a complete list. Talk with your dietitian about what dietary choices are best for you.  Grains  Whole-grain or whole-wheat bread. Whole-grain or whole-wheat pasta. Brown rice. Oatmeal. Quinoa. Bulgur. Whole-grain and low-sodium cereals. Reyna bread. Low-fat, low-sodium crackers. Whole-wheat flour tortillas.  Vegetables  Fresh or frozen vegetables (raw, steamed, roasted, or grilled). Low-sodium or reduced-sodium tomato and vegetable juice. Low-sodium or reduced-sodium tomato sauce and tomato paste. Low-sodium or reduced-sodium canned vegetables.  Fruits  All fresh, dried, or frozen fruit. Canned fruit in natural juice (without  added sugar).  Meat and other protein foods  Skinless chicken or turkey. Ground chicken or turkey. Pork with fat trimmed off. Fish and seafood. Egg whites. Dried beans, peas, or lentils. Unsalted nuts, nut butters, and seeds. Unsalted canned beans. Lean cuts of beef with fat trimmed off. Low-sodium, lean deli meat.  Dairy  Low-fat (1%) or fat-free (skim) milk. Fat-free, low-fat, or reduced-fat cheeses. Nonfat, low-sodium ricotta or cottage cheese. Low-fat or nonfat yogurt. Low-fat, low-sodium cheese.  Fats and oils  Soft margarine without trans fats. Vegetable oil. Low-fat, reduced-fat, or light mayonnaise and salad dressings (reduced-sodium). Canola, safflower, olive, soybean, and sunflower oils. Avocado.  Seasoning and other foods  Herbs. Spices. Seasoning mixes without salt. Unsalted popcorn and pretzels. Fat-free sweets.  What foods are not recommended?  The items listed may not be a complete list. Talk with your dietitian about what dietary choices are best for you.  Grains  Baked goods made with fat, such as croissants, muffins, or some breads. Dry pasta or rice meal packs.  Vegetables  Creamed or fried vegetables. Vegetables in a cheese sauce. Regular canned vegetables (not low-sodium or reduced-sodium). Regular canned tomato sauce and paste (not low-sodium or reduced-sodium). Regular tomato and vegetable juice (not low-sodium or reduced-sodium). Pickles. Olives.  Fruits  Canned fruit in a light or heavy syrup. Fried fruit. Fruit in cream or butter sauce.  Meat and other protein foods  Fatty cuts of meat. Ribs. Fried meat. Beltran. Sausage. Bologna and other processed lunch meats. Salami. Fatback. Hotdogs. Bratwurst. Salted nuts and seeds. Canned beans with added salt. Canned or smoked fish. Whole eggs or egg yolks. Chicken or turkey with skin.  Dairy  Whole or 2% milk, cream, and half-and-half. Whole or full-fat cream cheese. Whole-fat or sweetened yogurt. Full-fat cheese. Nondairy creamers. Whipped toppings.  Processed cheese and cheese spreads.  Fats and oils  Butter. Stick margarine. Lard. Shortening. Ghee. Beltran fat. Tropical oils, such as coconut, palm kernel, or palm oil.  Seasoning and other foods  Salted popcorn and pretzels. Onion salt, garlic salt, seasoned salt, table salt, and sea salt. Worcestershire sauce. Tartar sauce. Barbecue sauce. Teriyaki sauce. Soy sauce, including reduced-sodium. Steak sauce. Canned and packaged gravies. Fish sauce. Oyster sauce. Cocktail sauce. Horseradish that you find on the shelf. Ketchup. Mustard. Meat flavorings and tenderizers. Bouillon cubes. Hot sauce and Tabasco sauce. Premade or packaged marinades. Premade or packaged taco seasonings. Relishes. Regular salad dressings.  Where to find more information:  · National Heart, Lung, and [employer] [place]: www.nhlbi.nih.gov  · American Heart Association: www.heart.org  Summary  · The DASH eating plan is a healthy eating plan that has been shown to reduce high blood pressure (hypertension). It may also reduce your risk for type 2 diabetes, heart disease, and stroke.  · With the DASH eating plan, you should limit salt (sodium) intake to 2,300 mg a day. If you have hypertension, you may need to reduce your sodium intake to 1,500 mg a day.  · When on the DASH eating plan, aim to eat more fresh fruits and vegetables, whole grains, lean proteins, low-fat dairy, and heart-healthy fats.  · Work with your health care provider or diet and nutrition specialist (dietitian) to adjust your eating plan to your individual calorie needs.  This information is not intended to replace advice given to you by your health care provider. Make sure you discuss any questions you have with your health care provider.  Document Released: 12/06/2012 Document Revised: 12/11/2017 Document Reviewed: 12/11/2017  [employer] Interactive Patient Education © 2019 [employer]

## 2019-09-15 NOTE — PROGRESS NOTES
Subjective   Chief Complaint   Patient presents with   • Follow-up     meds     Tahira Zimmerman is a 64 y.o. female.     Hyperlipidemia   This is a chronic problem. The problem is uncontrolled. Recent lipid tests were reviewed and are high. Pertinent negatives include no chest pain or shortness of breath. Current antihyperlipidemic treatment includes statins. The current treatment provides moderate improvement of lipids. There are no compliance problems.    Hypertension   This is a chronic problem. The current episode started more than 1 year ago. The problem has been gradually improving since onset. The problem is controlled. Pertinent negatives include no blurred vision, chest pain, palpitations or shortness of breath. There are no compliance problems.       Past Medical History:   Diagnosis Date   • Anemia 2013   • Clotting disorder (CMS/HCC) 2013    Low platelets from chemo   • Colon polyp 2013   • Deep vein thrombosis (CMS/HCC) 2013   • Diverticulosis 2013   • GERD (gastroesophageal reflux disease) 2016   • HL (hearing loss) 2016    I need hearing aids   • Hyperlipidemia 2013    Need my cholesterol rechecked   • Hypertension    • Low back pain 2013   • Neuromuscular disorder (CMS/HCC) 2015    Shingles, pinched nerves in my neck   • Osteopenia 2014   • Osteoporosis    • Ovarian cancer (CMS/HCC)    • Pneumonia 2010    Have had it several times   • Urinary tract infection 2013    Have had several utis     Past Surgical History:   Procedure Laterality Date   • COLON SURGERY     • COLONOSCOPY     • EXPLORATORY LAPAROTOMY     • HERNIA REPAIR     • HYSTERECTOMY     • OOPHORECTOMY       Allergies   Allergen Reactions   • Morphine Nausea Only and Hives   • Penicillin V Potassium Rash   • Sulfa Antibiotics Rash   • Levaquin [Levofloxacin] Nausea Only and Dizziness     When taken with Coumadin   • Amoxicillin Rash   • Norco [Hydrocodone-Acetaminophen] Rash     Social History     Socioeconomic History   • Marital  status:      Spouse name: Not on file   • Number of children: Not on file   • Years of education: Not on file   • Highest education level: Not on file   Tobacco Use   • Smoking status: Never Smoker   • Smokeless tobacco: Never Used   Substance and Sexual Activity   • Alcohol use: No     Frequency: Never   • Drug use: No   • Sexual activity: Not Currently     Partners: Male     Birth control/protection: Surgical     Social History     Tobacco Use   Smoking Status Never Smoker   Smokeless Tobacco Never Used       family history includes Cancer in her father; Hypertension in her brother, father, mother, and sister; Stroke in her brother.  Current Outpatient Medications on File Prior to Visit   Medication Sig Dispense Refill   • Acetaminophen 500 MG coapsule Take 100 mg by mouth 4 (Four) Times a Day. As needed     • atorvastatin (LIPITOR) 20 MG tablet Take 20 mg by mouth every night at bedtime.  3   • Cyanocobalamin (B-12 COMPLIANCE INJECTION) 1000 MCG/ML kit Inject 1,000 mcg into the appropriate muscle as directed by prescriber Every 30 (Thirty) Days.     • famotidine (PEPCID) 40 MG tablet TAKE 1 TABLET BY MOUTH EVERY MORNING BEFORE BREAKFAST 90 tablet 1   • hydrochlorothiazide (HYDRODIURIL) 25 MG tablet Take 25 mg by mouth Daily.     • lisinopril (PRINIVIL,ZESTRIL) 20 MG tablet Take 20 mg by mouth Daily.     • LYRICA 100 MG capsule Take 1 capsule by mouth 3 (Three) Times a Day. 90 capsule 1   • magnesium oxide (MAG-OX) 400 MG tablet Take 1 tablet by mouth 2 (Two) Times a Day. 60 tablet 3   • ondansetron ODT (ZOFRAN-ODT) 8 MG disintegrating tablet PLACE 1 TABLET IN MOUTH TO DISSOLVE EVERY 8 HOURS AS NEEDED FOR NAUSEA 20 tablet 3   • oxyCODONE (ROXICODONE) 10 MG tablet Take 1 tablet by mouth 3 (Three) Times a Day As Needed for Moderate Pain . 90 tablet 0   • potassium chloride (KLOR-CON) 20 MEQ CR tablet Take 20 mEq by mouth Daily.     • sertraline (ZOLOFT) 50 MG tablet TAKE 1 TABLET BY MOUTH EVERY EVENING BEFORE  "BED 90 tablet 1   • Syringe/Needle, Disp, (B-D 3CC LUER-JESSICA SYR 23GX1\") 23G X 1\" 3 ML misc BD LUER-JESSICA SYRINGE 23G X 1\" 3 ML     • temazepam (RESTORIL) 30 MG capsule Take 1 capsule by mouth At Night As Needed for Sleep. 30 capsule 0   • triamterene-hydrochlorothiazide (DYAZIDE) 37.5-25 MG per capsule TAKE 1 CAPSULE BY MOUTH EVERY DAY 90 capsule 1   • valACYclovir (VALTREX) 500 MG tablet Take 1,000 mg by mouth Daily.     • warfarin (COUMADIN) 5 MG tablet Take 5 mg by mouth Daily.  3     No current facility-administered medications on file prior to visit.      Patient Active Problem List   Diagnosis   • Malignant neoplasm of right ovary (CMS/HCC)   • Hypomagnesemia   • Left upper extremity deep vein thrombosis (CMS/HCC)   • Pernicious anemia   • Malabsorption due to intolerance, not elsewhere classified   • MELANI (iron deficiency anemia)   • Pancytopenia due to antineoplastic chemotherapy (CMS/HCC)   • Encounter for antineoplastic chemotherapy   • Long term (current) use of anticoagulants   • Monitoring for long-term anticoagulant use   • Leg pain, bilateral   • Osteoarthritis of cervical spine without myelopathy   • Other long term (current) drug therapy   • Pain   • Hyperlipidemia   • Hypertension       The following portions of the patient's history were reviewed and updated as appropriate: allergies, current medications, past family history, past medical history, past social history, past surgical history and problem list.    Review of Systems   Constitutional: Negative for chills and fever.   HENT: Negative for sinus pressure and sore throat.    Eyes: Negative for blurred vision.   Respiratory: Negative for cough and shortness of breath.    Cardiovascular: Negative for chest pain and palpitations.   Gastrointestinal: Negative for abdominal pain.   Endocrine: Negative for polyuria.   Skin: Negative for rash.   Neurological: Negative for dizziness and headache.   Hematological: Negative for adenopathy. "   Psychiatric/Behavioral: Negative for depressed mood.       Objective   /80 (BP Location: Left arm, Patient Position: Sitting, Cuff Size: Adult)   Pulse 55   Temp 98 °F (36.7 °C) (Oral)   Wt 80.7 kg (178 lb)   SpO2 99%   BMI 29.62 kg/m²   Physical Exam   Constitutional: She is oriented to person, place, and time. She appears well-developed. No distress.   HENT:   Head: Normocephalic.   Eyes: Conjunctivae and lids are normal.   Neck: Trachea normal. No thyroid mass and no thyromegaly present.   Cardiovascular: Normal rate, regular rhythm and normal heart sounds.   Pulmonary/Chest: Effort normal and breath sounds normal.   Lymphadenopathy:     She has no cervical adenopathy.   Neurological: She is alert and oriented to person, place, and time.   Skin: Skin is warm and dry.   Psychiatric: She has a normal mood and affect. Her speech is normal and behavior is normal. She is attentive.       Office Visit on 08/26/2019   Component Date Value Ref Range Status   • Total Cholesterol 08/26/2019 222* <=200 mg/dL Final   • Triglycerides 08/26/2019 92  <=150 mg/dL Final   • HDL Cholesterol 08/26/2019 58  >=39 mg/dL Final   • LDL Cholesterol  08/26/2019 154* 0 - 100 mg/dL Final   • VLDL Cholesterol 08/26/2019 18.4  mg/dL Final   • LDL/HDL Ratio 08/26/2019 2.51   Final   • Chol/HDL Ratio 08/26/2019 3.83   Final   • Glucose 08/26/2019 79  65 - 99 mg/dL Final   • BUN 08/26/2019 18  8 - 20 mg/dL Final   • Creatinine 08/26/2019 1.00  0.40 - 1.00 mg/dL Final   • Sodium 08/26/2019 141  136 - 144 mmol/L Final   • Potassium 08/26/2019 4.3  3.6 - 5.1 mmol/L Final   • Chloride 08/26/2019 102  101 - 111 mmol/L Final   • CO2 08/26/2019 25.0  22.0 - 32.0 mmol/L Final   • Calcium 08/26/2019 9.3  8.9 - 10.3 mg/dL Final   • Total Protein 08/26/2019 6.3  6.1 - 7.9 g/dL Final   • Albumin 08/26/2019 3.90  3.50 - 4.80 g/dL Final   • ALT (SGPT) 08/26/2019 13* 14 - 54 U/L Final   • AST (SGOT) 08/26/2019 24  15 - 41 U/L Final   • Alkaline  Phosphatase 08/26/2019 108* 32 - 91 U/L Final   • Total Bilirubin 08/26/2019 0.6  0.3 - 1.2 mg/dL Final   • eGFR Non African Amer 08/26/2019 56* >60 mL/min/1.73 Final   • Globulin 08/26/2019 2.4* 2.5 - 3.8 gm/dL Final   • A/G Ratio 08/26/2019 1.6  1.0 - 1.7 g/dL Final   • BUN/Creatinine Ratio 08/26/2019 18.0  5.4 - 26.2 Final   • Anion Gap 08/26/2019 18.3* 5.0 - 15.0 mmol/L Final   Hospital Outpatient Visit on 08/23/2019   Component Date Value Ref Range Status   • Magnesium 08/23/2019 1.4* 1.8 - 2.5 mg/dL Final   • WBC 08/23/2019 3.20* 3.40 - 10.80 10*3/mm3 Final   • RBC 08/23/2019 2.72* 3.77 - 5.28 10*6/mm3 Final   • Hemoglobin 08/23/2019 9.2* 12.0 - 15.9 g/dL Final   • Hematocrit 08/23/2019 28.6* 34.0 - 46.6 % Final   • MCV 08/23/2019 105.1* 79.0 - 97.0 fL Final   • MCH 08/23/2019 33.8* 26.6 - 33.0 pg Final   • MCHC 08/23/2019 32.2  31.5 - 35.7 g/dL Final   • RDW 08/23/2019 15.0  12.3 - 15.4 % Final   • RDW-SD 08/23/2019 55.8* 37.0 - 54.0 fl Final   • MPV 08/23/2019 10.8  6.0 - 12.0 fL Final   • Platelets 08/23/2019 81* 140 - 450 10*3/mm3 Final   • Neutrophil % 08/23/2019 46.9  42.7 - 76.0 % Final   • Lymphocyte % 08/23/2019 34.7  19.6 - 45.3 % Final   • Monocyte % 08/23/2019 14.7* 5.0 - 12.0 % Final   • Eosinophil % 08/23/2019 3.1  0.3 - 6.2 % Final   • Basophil % 08/23/2019 0.6  0.0 - 1.5 % Final   • Neutrophils, Absolute 08/23/2019 1.50* 1.70 - 7.00 10*3/mm3 Final   • Lymphocytes, Absolute 08/23/2019 1.11  0.70 - 3.10 10*3/mm3 Final   • Monocytes, Absolute 08/23/2019 0.47  0.10 - 0.90 10*3/mm3 Final   • Eosinophils, Absolute 08/23/2019 0.10  0.00 - 0.40 10*3/mm3 Final   • Basophils, Absolute 08/23/2019 0.02  0.00 - 0.20 10*3/mm3 Final   Lab on 08/20/2019   Component Date Value Ref Range Status   • WBC 08/20/2019 2.97* 3.40 - 10.80 10*3/mm3 Final   • RBC 08/20/2019 2.79* 3.77 - 5.28 10*6/mm3 Final   • Hemoglobin 08/20/2019 9.5* 12.0 - 15.9 g/dL Final   • Hematocrit 08/20/2019 29.2* 34.0 - 46.6 % Final   • MCV  08/20/2019 104.7* 79.0 - 97.0 fL Final   • MCH 08/20/2019 34.1* 26.6 - 33.0 pg Final   • MCHC 08/20/2019 32.5  31.5 - 35.7 g/dL Final   • RDW 08/20/2019 14.7  12.3 - 15.4 % Final   • RDW-SD 08/20/2019 53.7  37.0 - 54.0 fl Final   • MPV 08/20/2019 11.7  6.0 - 12.0 fL Final   • Platelets 08/20/2019 48* 140 - 450 10*3/mm3 Final   • Neutrophil % 08/20/2019 37.7* 42.7 - 76.0 % Final   • Lymphocyte % 08/20/2019 38.0  19.6 - 45.3 % Final   • Monocyte % 08/20/2019 18.9* 5.0 - 12.0 % Final   • Eosinophil % 08/20/2019 5.1  0.3 - 6.2 % Final   • Basophil % 08/20/2019 0.3  0.0 - 1.5 % Final   • Neutrophils, Absolute 08/20/2019 1.12* 1.70 - 7.00 10*3/mm3 Final   • Lymphocytes, Absolute 08/20/2019 1.13  0.70 - 3.10 10*3/mm3 Final   • Monocytes, Absolute 08/20/2019 0.56  0.10 - 0.90 10*3/mm3 Final   • Eosinophils, Absolute 08/20/2019 0.15  0.00 - 0.40 10*3/mm3 Final   • Basophils, Absolute 08/20/2019 0.01  0.00 - 0.20 10*3/mm3 Final           Assessment/Plan       Tahira was seen today for follow-up.    Diagnoses and all orders for this visit:    Hyperlipidemia, unspecified hyperlipidemia type  -     Lipid Panel  -     Comprehensive Metabolic Panel    Hypertension, unspecified type    Other orders  -     Discontinue: temazepam (RESTORIL) 15 MG capsule; Take 1 capsule by mouth At Night As Needed for Sleep.

## 2019-09-16 ENCOUNTER — HOSPITAL ENCOUNTER (OUTPATIENT)
Dept: ONCOLOGY | Facility: HOSPITAL | Age: 64
Discharge: HOME | End: 2019-09-16
Admitting: NURSE PRACTITIONER

## 2019-09-16 ENCOUNTER — LAB (OUTPATIENT)
Dept: LAB | Facility: HOSPITAL | Age: 64
End: 2019-09-16

## 2019-09-16 VITALS
DIASTOLIC BLOOD PRESSURE: 59 MMHG | WEIGHT: 180 LBS | SYSTOLIC BLOOD PRESSURE: 131 MMHG | TEMPERATURE: 98.1 F | HEART RATE: 62 BPM | BODY MASS INDEX: 29.99 KG/M2 | RESPIRATION RATE: 18 BRPM | HEIGHT: 65 IN

## 2019-09-16 DIAGNOSIS — E83.42 HYPOMAGNESEMIA: ICD-10-CM

## 2019-09-16 DIAGNOSIS — I82.722 CHRONIC DEEP VEIN THROMBOSIS (DVT) OF LEFT UPPER EXTREMITY, UNSPECIFIED VEIN (HCC): ICD-10-CM

## 2019-09-16 DIAGNOSIS — Z79.01 LONG TERM (CURRENT) USE OF ANTICOAGULANTS: Primary | ICD-10-CM

## 2019-09-16 LAB
INR PPP: 2.1
MAGNESIUM SERPL-MCNC: 1.4 MG/DL (ref 1.8–2.5)

## 2019-09-16 PROCEDURE — 83735 ASSAY OF MAGNESIUM: CPT | Performed by: NURSE PRACTITIONER

## 2019-09-16 PROCEDURE — 85610 PROTHROMBIN TIME: CPT

## 2019-09-16 RX ORDER — POTASSIUM CHLORIDE 1500 MG/1
TABLET, EXTENDED RELEASE ORAL
Qty: 450 TABLET | Refills: 1 | Status: SHIPPED | OUTPATIENT
Start: 2019-09-16 | End: 2019-12-08

## 2019-09-18 DIAGNOSIS — D50.8 OTHER IRON DEFICIENCY ANEMIA: Primary | ICD-10-CM

## 2019-09-18 DIAGNOSIS — R05.9 COUGH: ICD-10-CM

## 2019-09-19 RX ORDER — AZITHROMYCIN 250 MG/1
TABLET, FILM COATED ORAL
Qty: 6 TABLET | Refills: 0 | OUTPATIENT
Start: 2019-09-19

## 2019-09-20 NOTE — PROGRESS NOTES
Hematology/Oncology Outpatient Follow Up    PATIENT NAME:Tahira Zimmerman  :1955  MRN: [id number]  PRIMARY CARE PHYSICIAN: Noemy Queen MD  REFERRING PHYSICIAN: Noemy Queen MD    Chief Complaint   Patient presents with   • Follow-up     Malignant neoplasm of right ovary,  Hypomagnesemia, Other iron deficiency anemia, Malabsorption due to intolerance,  Chronic deep vein thrombosis (DVT) of left upper extremity,  Pernicious anemia, Pancytopenia due to antineoplastic chemotherapy , Encounter for antineoplastic chemotherapy,  Long term (current) use of anticoagulants,  Encounter for monoclonal antibody treatment for malignancy         HISTORY OF PRESENT ILLNESS:   1. Recurrent ovarian cancer diagnosis established in 2013.  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  The patient was treated with surgery and then postoperatively with adjuvant chemotherapy consisting of Carboplatin and Taxol.  Six months later, she had recurrence of disease intra-abdominally between the rectum and vagina, which was treated with intraabdominal peritoneal chemotherapy.  She had been free of disease since that time.  She reported feeling a mass in the left side of her abdomen approximately six months ago.  This gradually grew and in early 2013 she had her daughter feel the mass.  The daughter works for Dr. David Rogers and the patient at that time also complained of intermittent rectal bleeding.  Consultation with Dr. David Rogers was obtained.  The patient had a CT scan of the abdomen and pelvis performed on 13 revealing left lower quadrant mass measuring 9.7 x 9.3 x 6 cm demonstrating areas of dense calcification, soft tissue density and cystic density within it.  Stromal tumor is felt to be most likely a possibly fibroma.  It is generated with necrosis and calcification.  Possible palpable mass in the  rectus muscle is seen with calcification and deformity of the rectus muscle and just below this a second mass intra-abdominally was seen measuring 2 cm in the greatest diameter suspicious for second intraabdominal tumor.  The mass separate from the largest mass measuring 2.6 cm in the dependent portion of pelvis is seen on the right of the midline.  No retroperitoneal lymphadenopathy was seen.  No free air, free fluid or other abnormality was present.  The patient was scheduled for an outpatient colonoscopy, but had syncopal episode while sitting in the toilet the night before and was brought to the emergency room early in the morning by her daughter and son-in-law.  The patient had apparently stopped breathing for a few seconds and did not have a pulse, but started breathing before CPR could be initiated.  In the emergency room, the patient had an EKG revealing sinus rhythm nonspecific T-wave flattening; metabolic panel revealed a glucose of 148, creatinine of 1.3, CBC was normal.  She was treated with intravenous fluid.  The patient’s case was then discussed with Dr. Anabell Monique and patient was subsequently admitted to [facility].  The patient underwent a colonoscopy by Dr. David Rogers on 06/27/13 revealing sigmoid diverticulosis and grade II internal and external hemorrhoids, but no mass or colitis was seen.  CT-guided biopsy of the mass was performed on 06/27/13 as well revealing metastatic papillary adenocarcinoma with numerous psammoma bodies.  Pathology comment stated prior records were not available; however, with history of ovarian cancer the current biopsy would be consistent with metastases from that malignancy.  Oncology consult was obtained and I initially saw the patient on 06/27/2013 where the patient had claimed to have little bit of pain in the area of disease.  She reported being frequently constipated, denies any weight loss or fevers.  She did report having some night sweats,  but thought that was because of her menopause.  On 06/27/13 metastatic workup including labs and CT scans were initiated.  CT scan of the chest on 06/27/13 revealed no acute disease in the chest.  A 1.4 cm size focal area of decreased density was noted in the lateral aspect of the right lobe of the liver consistent with a benign cyst or hemangioma.  There was a very small hiatal hernia measuring 2.2 cm in diameter.  A CT scan of the head performed 06/27/13 revealed no acute intracranial abnormality noted.  CA-125 was 40 units/ml (0-35), CA 19-9 was 11.8 units/ml (0-35), CEA level was 0.8 ng/ml (0-3), TSH level was 1.09 IU/ml (0.34-5.6).  The patient was in workup for the syncopal episode, a carotid Doppler was performed on June 28 revealing an essentially normal study showing a reduction in diameter from 0-15% bilaterally.  A Holter monitor was completed on 06/27/13, which was unremarkable except for a few premature atrial contractions.  The patient was felt stable and subsequently was discharged home on 06/28/13.  Post discharge, the patient was seen at [facility] on 07/05/13 by Dr. Kathryn Thrasher who discussed treatment options with the patient including surgery.  The patient is in the office today 07/16/13 in follow up post hospitalization.  She is reporting that surgery is planned for July 30th of this month at .  · 7/30/13 to 8/3/13 - The patient was in [facility].  The patient was admitted for surgery for her recurrent ovarian cancer.  The patient had exploratory laparotomy, omentectomy, bowel resection, liver biopsy and appendectomy.  Pathology revealed of the omentum metastatic serous carcinoma of the transverse colon, segmental resection showed metastatic serous carcinoma involving mesenteric fat.  The liver wedge resection showed fibrosclerotic thickening of the hepatic capsule.  Benign liver parenchyma.  No evidence of metastatic tumor.  Appendix showed  fibrous obliteration of the lumen.  Negative for metastatic carcinoma.  Rectum and sigmoid colon segmental resection showed metastatic serous carcinoma invading the colorectal mucosa uninvolved by tumor.  Vaginal mass showed metastatic serous carcinoma.  Margins are positive for tumor.  · 9/24/13 - Chemotherapy orders were written for patient to start carboplatin 550 mg IV day one and Taxol 330 mg IV day one to be cycled every three weeks.  · 10/10/13 - The patient started cycle #1 of chemotherapy consisting of Carboplatin and Taxol.  · 09/25/13 -  is 10.6 normal.  · 10/01/13 - CT of the chest, abdomen and pelvis revealed new reticular nodular occupancy in the anterior segment of the right upper lobe, could reflect an inflammation or infectious etiology or could reflect unusual persistence of metastatic tumor.  New liver lesions, the smaller one appears to be implant on the surface of the liver, the larger may also represent an implant including the intrahepatic.  Several small mesenteric nodes seen throughout the abdomen and pelvis.  · 10/02/13 - Port placed by Dr. Sen.  · 10/24/13 - Orders written to start Neupogen daily and if more than three Neupogen are needed to give Neulasta after next chemo.  ANC is 0.15.  · 10/31/14 - Patient given cycle 2 of chemotherapy with Taxol and Carboplatin.  · 11/21/13 - The patient started cycle 3 of chemotherapy consisting of Carboplatin and Taxol.  · 11/26/13 - CT scan of chest, abdomen and pelvis revealed no evidence of active disease in the chest.  Reticulonodular opacity has resolved.  Metastatic lesion impressing upon the hepatic dome similar to prior exam.  No evidence of a change.  No evidence of new disease.  Postsurgical changes in the pelvis and throughout the retroperitoneum in the large bowel.  Finding containing anterior abdominal wall hernia containing fat tissue and enhancing vessels, 3 cm in diameter.  · 12/2/13 - Orders written to start Neupogen per  protocol as well as Aranesp due to low hemoglobin and ANC.  ANC is 0.14.  Hemoglobin is 9.7.  · 12/11/13 - Orders written to hold chemotherapy until next week and decrease dose by 20% due to low platelet count.  Platelet count is 85,000.  · 12/16/13 - The patient received cycle 4 of Carboplatin and Taxol at a 20% dose reduction so Taxol dose is 260 mg and Carboplatin is 440 mg.  · 12/16/13 - CA-125 12.6 (N).  · 12/19/13 - PET/CT scan.  Impression:  1. There is no evidence of hypermetabolism in the liver.  Specifically of the dominant lesion in or adjacent to the right hepatic dome that was seen and described on the recent CT study of 11/26/2013.  It is photopenic relative to the surrounding liver.  2.  There is no evidence of hypermetabolic soft issue mass lesion or free fluid in the abdomen or pelvis.  3.  The tonsillar tissues are slightly enlarged and have moderate activity level.  This maybe physiologic.  Correlation with oral cavity, examination is recommended.  4.  Mild amount of activity in the right level II jugular lymph chain without focally enlarged lymph nodes is of questionable significance.  · 1/6/13 - The patient received cycle 5 of chemotherapy with Taxol and Carboplatin.  · 1/27/14 - The patient started on cycle 6 of Taxol and Carboplatin.  · 2/18/14 - CT of the abdomen and pelvis with contrast; stable lesions impressing upon the hepatic dome, unchanged compared to the prior exam.  Multiple anterior abdominal wall hernias re-demonstrated.  Those above the umbilicus contain omental fat.  Below and to the left of the umbilicus as anterior abdominal hernia containing omental fat in nondilated small bowel which is larger than seen previously.  · 2/20/14 - The patient received cycle 7 of chemotherapy with Taxol and Carboplatin.   · 3/11/14 - Order written to discontinue chemotherapy due to patient has completed 7 cycles of chemotherapy and CT scans suggest possible remission.  · 3/11/14 -  11.0  (N).  · 06/05/14 - CT scan of the chest abdomen and pelvis with contrast: There are multiple anterior abdominal wall hernias.  There are 2 larger anterior abdominal wall hernias at the level of the transverse colon, which contain omental fat.  There are at least 2 small anterior abdominal wall hernias that contain omental fat.  There is a moderate sized anterior abdominal wall hernia at the level of the umbilicus, eccentric to the left, which contain non-dilated bowel.  Interval decrease in the size of two deposit impressing on the liver.  The third tiny deposit has been stable.  · 8/19/14 -  10.4 (N).  · 12/19/14 -   10.0 (N)  · 1/7/15 - CT chest, abdomen and pelvis with stable appearance of the chest with several micronodules. Small hiatal hernia, slightly larger than previous exam, increased from 1.5 to 2.5 cm in diameter. Bochdalek hernia unchanged. Multiple hepatic lesions. The two dominant lesions at the right hepatic dome had decreased in size from previous. The remaining tiny nodules measured 3-5 mm in diameter and were unchanged. There was no evidence of new intra-abdominal or pelvic mass or free fluid. There were multiple anterior abdominal wall hernias which had increased in size.   · 7/27/15 - Comprehensive metabolic panel with no significant abnormalities. CA-125 of 21 (0-35).   · 10/26/15 - WBC 6, hemoglobin 13, platelet count 204,000. Heart rate 47. CA-125 of 20 (0-35).    · 2/18/16 - CT chest with contrast with stable micronodular density seen in the lungs without significant change from prior exam. CT abdomen and pelvis with regression of liver lesions with no new evidence of metastasis. Multiple ventral hernias again noted without significant change from prior exam. Creatinine 0.9 (0.6-1.3).    · 2/25/16 - Patient hospitalized at [facility] between 2/25/16 and 2/27/16 with pyelonephritis secondary to E-coli. She was given two days of IV Rocephin and then placed on oral  Levaquin. She was asked to hold her Coumadin, but then had nausea and vomiting because of Levaquin and stopped the Levaquin also. Her CA-125 was 32 (0-35) and CEA 1.3 (0-5). Her urine grew >100,000 E-coli. CT of the abdomen and pelvis was done which revealed 4.1 x 1.8 cm sized mild ovoid area of decreased density in the liver parenchyma along the anteromedial aspect of the dome of the right lobe of the liver, unchanged significantly since the previous study of 2/18/16. There was suspicion of a very small pericardial effusion. There was suspicion of a small hiatal hernia. There were several hernias in the anterior abdominal wall. A 3.5 x 3.1 cm incised well-circumscribed abnormal density in the left retroperitoneal fatty soft tissue at the level of the left iliac crest was suggestive of tumor recurrence. There was an abnormal density in the left retroperitoneal soft tissues in the left flank that partially surrounded the left kidney and extended downward to the left pelvic area. Diverticulosis of the distal descending colon and sigmoid colon were seen.     · 3/8/16 - Patient prescribed Bactrim DS 1 p.o. b.i.d. for 10 days for pyelonephritis, which was partially treated with Rocephin and Levaquin. Urine with 40,000 E-coli treated with Macrobid for 7 days.  of 20 (0-35).    · 3/31/16 - PET scan with area of low density anteriorly in the right lobe of the liver with no significant radiopharmaceutical uptake to suggest malignancy. Multiple round soft tissue density masses showing increased radiopharmaceutical activity and multiple anterior abdominal wall hernias.   · 5/10/16 - Patient complains of venous enlargement of the left chest wall around the port. Has not been seen by [facility] yet, but has an appointment on 5/23/16. Complained of a cough.   · 5/12/16 - Infusaport contrast study with no evidence of fibrin sheath. Chest x-ray with mild cardiac prominence.   · 5/23/16 - Patient seen in consultation by Sheng RAYA  KIERSTEN Bowman at [facility] and a chemotherapy trial recommended.   · 6/1/16 -   of 27.1 (0-35).    · 6/14/16 - WBC 5.71, hemoglobin 12.4, platelet count 188,000. Patient is scheduled for surgery at  on 6/16/16 after further discussion with  physicians. Discussed adjuvant chemotherapy.   · 6/16/16 - Excisional biopsy of abdominal wall mass performed by Sheng Bowman M.D. at [facility] with pathology revealing metastatic high-grade papillary serous carcinoma.   · 7/7/16 - Received a call from the patient that Tramadol was not working and that she was given Norco #24 after her biopsy at  which did help her pain. Patient prescribed Norco 5/325 one p.o. q. 4-6 hours p.r.n. #60 with no refills. Echocardiogram with left ventricular inferior wall hypokinesis with ejection fraction of 50-55%.   · 7/8/16 - Patient seen in consultation by Sheng Bowman M.D. in consultation at  for metastatic high-grade papillary serous carcinoma diagnosed by excisional biopsy on 6/16/16 with carcinomatosis and patient not a surgical resection candidate. Genetic sequencing and susceptibility testing of tumor was ordered. Biopsy was done of anterior abdominal wall.   · 7/7/16 - Echocardiogram with left atrial enlargement. Mild mitral regurgitation. Left ventricular proximal inferior wall hypokinesis with ejection fraction of 50-55%.   · 7/11/16 - While waiting for genomics results, in order not to delay treatment further, order written for Taxotere 60 mg/M2 IV over one hour followed by Carboplatin AUC 5 over one hour, cycles to be repeated every three weeks.  Comprehensive metabolic panel normal.  26 (0-35).    · 725/16 - Pain contract signed for use of Norco.   · 8/5/16 - Patient stated that she experienced a red rash and was itchy post taking Norco. Norco discontinued and Tramadol refilled.   · 8/16/16 - Patient started cycle 1 chemotherapy. WBC 7.1, hemoglobin 11.2, MCV 88.7, platelet count 94,000.  Patient complained of nausea for a week post chemo. Already getting Emend, Aloxi and Decadron. Added Omeprazole 40 mg daily orally.   · 8/17/16 - Urine culture with >100,000 E-coli.   · 8/25/16 - Cycle 2 chemotherapy given.   · 9/9/16 - Magnesium 1.3, replaced intravenously. Potassium 3.4 (3.6-5.1), increased oral potassium supplementation.    · 9/16/16 - Cycle 3 chemotherapy given.   · 9/19/16 - Comprehensive metabolic panel with no significant abnormality.   · 9/20/16 - CT abdomen and pelvis with evidence of progressive metastatic disease in the abdomen and pelvis with interval enlargement of several soft tissue attenuations and subcutaneous nodules in the anterior abdominal wall. Interval enlargement of a left pelvic soft tissue attenuation mass. A lentiform area of low attenuation in the dome of the liver stable in size. Multiple anterior abdominal wall hernias containing fat and/or bowel. Marked pelvic floor laxity. CT chest negative for evidence of metastatic disease. Tiny pulmonary nodules in the right lung stable since 2013 consistent with a benign etiology.   · 9/23/16 - Macrobid 100 mg p.o. b.i.d. x7 days prescribed for UTI. Current chemotherapy discontinued after discussion and new chemotherapy orders written. Carboplatin AUC-5 IV day 1 and Doxil (Liposomal Doxorubicin) 30 mg/M2 IV day 1 to cycle q. 21 days.  of 18 (0-35). Magnesium 1.6, replaced intravenously. Comprehensive metabolic panel with potassium 3.4 (3.6-5.1).     · 10/13/16 - Patient started on cycle 1 of Carboplatin and Liposomal Doxorubicin followed by Neulasta.   · 11/3/16 - Cycle 2 Carbo and Doxil given.   · 11/17/16 - Magnesium 1.0, replaced intravenously. Oral potassium supplement increased.   · 11/29/16 - CT chest with small non-calcified right pulmonary nodules unchanged. Benign bone island in the midthoracic vertebral body along with benign bony hemangiomas in the middle thoracic vertebral bodies. CT abdomen and pelvis with mild  progressive metastatic disease from CT of September 2016. Anterior abdominal wall mass superiorly mildly increased in size with new area noted as described measuring 3 x 1.7 cm. 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 Stable area of decreased uptake in the dome of the liver not hypermetabolic on recent PET scan, likely representing cyst or focal fatty infiltration. Stable small hiatal hernia, fat-containing Bochdalek’s hernia and anterior abdominal wall hernias with stable pelvic floor relaxation.    · 12/1/16 - Cycle 3 chemotherapy given.   · 12/8/16 - Current chemotherapy discontinued and patient started on Gemcitabine 1000 mg/M2 IV days 1, 8, 15 q. 28 days.   · 12/22/16 - Patient seen in followup by Juliana Roy M.D. at the pain clinic, treated with Oxycodone and Lyrica. Transaminases normal. Patient started cycle 1 chemotherapy.   · 12/29/16 - Magnesium 1.1 (1.8-2.5), replaced intravenously.   · 1/5/17 - Cycle 1 day 15 chemotherapy held as patient leaving for vacation to [place]. Chemotherapy dose decreased by 20%. Patient complains of diarrhea for which Imodium prescribed.   · 1/11/17 - BRCA-1 and BRCA-2 negative.   · 1/12/17 - Echocardiogram with ejection fraction 60% or greater.   · 1/19/17 - Comprehensive metabolic panel with no significant abnormality. Patient started cycle 2 chemotherapy.   · 2/2/17 - Urine with >100,000 E-coli treated with Cipro 500 mg p.o. b.i.d. x1 week.   · 2/6/17 - Magnesium 1.1 (1.8-2.5), replaced intravenously.    · 2/23/17 - Patient started cycle 3 chemotherapy.   · 2/27/17 - CT chest with interval development of too numerous to count very small pulmonary nodules, ill-defined ground glass opacities concerning for development of pulmonary metastatic disease. Stable left-sided chest port. CT abdomen and pelvis with stable appearance of multiple small soft tissue densities within the abdomen near midline subcutaneous fat of the  abdominal wall. Interval decrease in size of largely calcified left pelvic mass and multiple fat in bowel containing small ventral hernias.   · 3/6/17 - Pulmonary consultation obtained for lung nodules. Discussed CT results with patient. Decision made to continue present chemotherapy until further lung evaluation as abdominal disease better.  of 18 (0-35).    · 3/16/17 - Patient started cycle 4 chemotherapy, but treatment held from day 8 onwards because of hospitalization.   · 3/19/17 - Patient was hospitalized at [facility] between 3/19/17 and 3/25/17 with chest pain. Chest x-ray had revealed increased mild bibasilar airspace disease. Troponin I and EKG’s were negative. INR was elevated. Patient was treated with antibiotics. EGD was performed revealing small hiatal hernia and esophageal dilatation was performed. Bronchoscopy was performed also with no intrabronchial lesions identified. IgA was 51 (), IgG 320 (600-1500) and IgM 19 (). Lexiscan nuclear stress test had no evidence of reversible myocardial ischemia with normal left ventricular ejection fraction of 58%. CT chest had development of patchy bilateral ground glass opacities most pronounced involving the left upper lobe and bilateral lower lobes. Multiple tiny bilateral pulmonary nodules were less conspicuous. The patient underwent a bronchoscopy by Jerica Esparza M.D. with right upper lobe bronchoalveolar lavage revealing benign squamous cells and bronchial cells with pulmonary macrophages present. There was mild acute inflammation. Negative for malignant cells. Prilosec was changed to Famotidine for hypomagnesemia.    · 4/6/17 - Magnesium 1.2 (1.8-2.5). Comprehensive metabolic panel with no significant abnormality. Patient seen in follow-up by Fito Ram M.D. at [facility]. Treated with Lyrica and Oxycodone.    · 5/4/17 - Patient complains of a rash itching on her right upper arm. Eczematous rash noted and patient prescribed  Cyclocort 0.1% b.i.d. Magnesium infusions continued with each chemo. Order written to resume chemotherapy.   · 5/16/17 - Cycle 5 chemotherapy started.   · 5/26/17 - Magnesium 1.4 (1.8-2.5), replaced intravenously. Potassium 2.9 (3.6-5.1), KCL increased to 20 mEq three in the morning and three in the evenings.   · 5/30/17 - PET scan with remaining metabolic activity within the nodular areas. The suggested mass which is partially calcified and necrotic within the left aspect of the pelvis seems slightly larger with increased metabolic activity. There was more calcification in these areas compared to prior study, suggesting inflammation.      · 6/8/17 - Patient complaining of shortness of breath. Does take HCTZ at home. Chest x-ray ordered and chemotherapy continued along with weekly magnesium replacement. Chest x-ray with no acute cardiopulmonary abnormalities.   · 6/13/17 - Patient received cycle 6 of chemotherapy.   · 7/31/17 - WBC 5.0, hemoglobin 11.2, MCV 89.7, platelet count 217,000. Patient is to resume chemotherapy starting with cycle 7 on Wednesday of this week.  of 19 (<35). Potassium 3.3 (3.5-5.3).     · 8/2/17 - Patient began cycle 7 of chemotherapy.   · 8/30/17 - Patient started cycle 8 chemotherapy.   · 9/5/17 - Patient seen in followup at Pain Management by Fito Ram M.D. and continued on treatment with Oxycodone and Lyrica.   · 9/13/17 - Patient was hospitalized at [facility] for a day with dizziness secondary to dehydration, hypokalemia and anemia. She was given 1 unit of packed red blood cells and electrolytes were replaced. Chest x-ray revealed a subtle opacity in the left lateral lung base favored to represent atelectasis. Magnesium 1.3 (1.5-2.5), patient continued on replacement.    · 9/22/17 - Chemotherapy and magnesium replacement continued with decrease in chemotherapy dose by 10% secondary to cytopenias.   · 9/25/17 - Cycle 9 chemotherapy started.   · 10/12/17 - CT of the chest with  contrast showed several tiny pulmonary nodules. One within the right lower lobe appears new from prior exams. Two adjacent foci of nodularity within the medial right lower lobe suggestive of minor infectious or inflammatory process. CT of the abdomen and pelvis with contrast which showed soft tissue nodules along the anterior abdominal and pelvic walls unchanged from previous scan. Also a partially calcified mass within the left pelvis unchanged. No acute process identified within the abdomen or pelvis. Several left paracentral ventral hernias unchanged. No evidence of acute bowel obstruction.   · 10/16/17 - Order written for Levaquin 500 mg p.o. daily as the patient states that she has had a productive cough, fever and chills and with the results of the CT scan showing a possible infectious versus inflammatory process. Order also written to continue chemotherapy and magnesium replacement as previously prescribed.   · 10/23/17 - Cycle 10 chemotherapy started.   · 11/19/17 - Patient went to the Emergency Room at [facility] complaining of right lower extremity redness and fever for a day. Venous Doppler had no evidence of a DVT and patient was discharged home on Clindamycin.   · 11/21/17 -  of 17 (0-35).   · 12/4/17 - Cycle 11 chemotherapy started.   · 12/20/17 - PET scan with multiple hypermetabolic soft tissue nodules abutting the anterior abdominal wall, stable from previous examination. Peripherally calcified left lower quadrant mass near the ascending colon stable in size demonstrating no hypermetabolic uptake. Previously had maximum SUV of 7. Right medial basilar pulmonary nodules resolved.   · 12/22/17 - CT left lower extremity with soft tissue swelling with skin thickening present along the anterior and medial aspect of the leg, slightly more pronounced around the palpable marker seen within the upper medial aspect of the lower extremity.   · 12/26/17 - Elastic stockings prescribed for lower extremity edema.    · 1/8/18 - Patient hospitalized at [facility] between 1/8/18 and 1/11/18 with fever of up to 101 degrees and painful rash on the lower extremities of several weeks’ duration. She was found to be hypokalemic and MRI of the lower extremities revealed thickening and swelling. Chest x-ray had no acute cardiopulmonary abnormality. Skin biopsy was performed by Surgery revealing stasis dermatitis and no evidence of malignancy. Infectious Disease consultation was obtained and IV antibiotics were stopped. Blood cultures had no growth.   · 1/22/18 - Patient asked to start wearing elastic stockings which she has not started yet. She was given a prescription for Dyazide 1 p.o. daily.   · 2/26/18 - Ordered chemo to resume again. Patient unaware that she was supposed to resume chemo after her last visit in January. Continue magnesium infusions on day 1, 8 and 15. Prescribed Levaquin 500 mg p.o. daily x10 days for productive cough x1 week. History of viral illness consistent with influenza.  of 18 (0-35). Chest x-ray with no acute process.    · 3/5/18 - Cycle 12 chemotherapy started.   · 3/26/18 - Options discussed with patient. Decision made to discontinue IV Gemzar and orders written to start on Niraparib (Zejula) 300 mg p.o. daily as maintenance therapy.   · 4/11/18 - Niraparib discontinued due to insurance denial. Orders written to start Carboplatin-AUC 5 IV day 1 cycling every 21 days. Orders written for Neulasta 6 mg subcutaneous kit day 1 with palliative treatment intent and expected duration of treatment three months.   · 4/12/18 - CT chest, abdomen and pelvis with contrast revealed numerous tiny centrilobular nodules in the lungs favored to reflect infectious or inflammatory process. Nodules ranged 1-3 mm in size and are new from prior studies with metastatic disease not entirely excluded. Stable small hiatal hernia. Interval progression of metastatic disease involving the subcutaneous tissues at the ventral abdominal  wall. Multiple soft tissue density nodules previously shown to be hypermetabolic on PET scan. New small crescent of low attenuation material along the periphery of the spleen with similar finding at the dome of the liver. Findings are concerning for peritoneal metastases. Density of the dome of the liver remained unchanged from multiple prior studies. Stable calcified mass in the left pelvic sidewall. Urinary bladder wall thickening.   · 4/23/18 - Cycle 1 chemotherapy with Carboplatin administered along with Neulasta injection.   · 4/26/18 - Neulasta discontinued due to insurance denial.   · 5/14/18 - Patient received cycle 2 Carboplatin.   · 6/5/18 - Cycle 3 day 1 chemotherapy with Carboplatin administered.   · 6/20/18 - CT chest, abdomen and pelvis at [facility] showed re-demonstration of soft tissue mass in the ventral wall hernia left of the midline as well as the dome of the liver, likely representing metastatic disease similar as compared to the prior study. Additional calcified lesions present in the upper left hemipelvis and along the anterior abdominal wall to the right of the midline also likely representing metastatic disease. No definite new lesions were identified. Moderate to large stool burden likely related to mild constipation was present. No suspicious lung nodules were identified. The previously-described ground glass nodules appeared to have resolved. There was a stable subpleural nodule in the right upper lobe measuring 3 mm present since 2014 without significant changes.   · 6/26/18 - Cycle 4 day 1 Carboplatin administered with addition of Neulasta for febrile neutropenia prophylaxis.   · 6/29/18 - Reviewed CT scans with patient. Orders written to discontinue Carboplatin and Neulasta and plan to start Niraparib 300 mg by mouth daily as maintenance therapy. Prescribed Amlodipine 2.5 mg p.o. daily for chemo-induced hypertension.   · 7/18/18 - Orders written to increase weekly Magnesium  Sulfate to 3 g IV weekly due to continued hypomagnesemia.   · 8/1/18 - Patient reports she has not received Niraparib (Zejula) to date.   · 8/30/18 - Patient’s phone was not working so though Niraparib approved, the drug company had not been able to get ahold of her. Patient supplied with drug company number so that she can start taking the pills.   · 9/6/18 -  of 20 (N).   · 9/18/18 - Urinalysis done for dysuria and back pain. Urine culture grew 20,000 to 50,000 colonies of urogenital ruy. Prescribed Bactrim DS one tablet twice daily for one week.   · September 2018 - Patient initiated on Zejula 300 mg p.o. daily.   · 10/1/18 - WBC 3.5, hemoglobin 12, platelet count 74,000. Niraparib (Zejula) dose held and then resumed when platelets above 100,000 at a dose of 200 mg daily.   · 10/15/18 - Patient received Niraparib (Zejula) at 200 mg p.o. daily with a platelet count of 198,000.   · 11/7/18 - WBC 6, hemoglobin 11.6, MCV 96.2, platelet count 137,000. Patient claims to have stopped taking Niraparib a few days prior because of cough. Asked to resume taking it. Ciprofloxacin 500 mg p.o. twice daily for one week for bronchitis.   · 12/3/18 - Magnesium Sulfate infusions changed to every two weeks, to send mag level before and give 3 grams. DC’d Magnesium Oxide and started Magnesium Plus Protein two pills twice daily.   · 1/4/19 - CT chest, abdomen and pelvis with new patchy nodular areas of airspace consolidation within the bilateral upper lobes, left greater than right, favored to be infectious or inflammatory. Interval enlargement of the peritoneal soft tissue masses within the pelvis compatible with progression of disease. Enlarging ventral hernia now containing multiple loops of small bowel and colon.   · 1/7/19 - Patient has not been coming in for weekly magnesium replacement as nursing has not been able to get ahold of her. Results of CT’s discussed with patient. Decision made to change her to IV chemotherapy  with Carboplatin AUC-5 day 1 and pegylated liposomal Doxorubicin (Doxil) 30 mg/M2 IV day 1 to cycle every four weeks. Patient made aware of the limited choices of her treatment available.   · 1/31/19 - Echocardiogram showed LVEF 50-55% and otherwise normal echo Doppler study.   · 2/4/19 - New chemotherapy not initiated to date with difficulty contacting patient for echocardiogram. Neulasta On-Pro 6 mg ordered with chemotherapy due to extensive prior exposure to chemotherapy, increasing risk of febrile neutropenia, history of neutropenic events on prior chemotherapy regimens.   · 2/15/19 - Patient started cycle 1 chemotherapy with Neulasta support.   · 3/6/19 -  of 28 (0-35).   · 3/15/19 - Cycle 2 Carboplatin with Liposomal Doxorubicin (Doxil) and Neulasta support initiated.     · 4/10/19 - Patient was requested to followup with Dr. Queen regarding hypotension and blood pressure medication dosing. Patient appears to be tolerating treatment well with cytopenias not requiring dose adjustments. No Doxil-related skin or mucus membrane toxicities or other significant toxicities to date.   · 4/12/19 - Cycle 3 chemotherapy given.   · 4/16/19 - CT chest, abdomen and pelvis with no evidence of active metastasis to the chest. Several tree-in-bud nodules within the periphery of the inferior right upper lobe likely infectious or inflammatory. Disease burden within abdomen and pelvis stable to slightly increased from prior CT. Specifically, a soft tissue mass along the right rectus muscle slightly increased in bulk. Multiple ventral hernias, some containing loops of bowel, with no evidence of acute bowel obstruction.   · 5/8/19 - Discussed CT results with patient. Overall stable disease and would like to continue same treatment. Dove soap and Hydrocortisone Cream p.r.n. prescribed for scattered rash on back.   · 5/10/19 - Cycle 4 Carboplatin and Liposomal Doxorubicin initiated.   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 It was ER positive, HER2/zuleima negative, PD-L1 negative. There was TOPO1 positivity and TUBB3 positivity. TMB was low (two mutations per megabyte MUTS/MB) and MSI was stable. There were 13 therapies considered with increased benefit. The 13 therapies with increased benefit included Fulvestrant, Irinotecan, Letrozole, Topotecan, Anastrazole, Exemestane, Tamoxifen, all of which were referenced to [facility] as well as Abemaciclib, Everolimus, Gefitinib, Palbociclib, Ribociclib and Toremifene.     · 6/4/19 - Past consented to [facility] registry by research coordinator, Genoveva Vu R.N. Patient tolerating Carboplatin and Liposomal Doxorubicin well with some expected cytopenias and some mild and tolerable Neulasta induced bone pain.  · 6/5/19 echocardiogram with preserved LV systolic function with EF around 60%.  · 6/7/19 cycle 5 chemotherapy with Neulasta support given.  Patient continued on mag oxide 400 mg p.o. twice daily and weekly IV 3 g mag sulfate infusions for persistent hypomagnesemia.  · 7/5/2019- cycle 6 chemotherapy with carboplatin and doxorubicin with Neulasta port initiated.  Ca125:  21 (0-35).  · 7/23/2019-CT chest abdomen and pelvis compared to CT from 4/16/2019 revealed multiple small pulmonary nodules unchanged from prior examination within the right upper and left lower lobes.  Complex ventral hernia similar to prior exam.  Soft tissue nodule along the right lateral margin of the right of the midline measuring 12 x 10 mm unchanged in size.  Irregular soft tissue nodular thickening along the margin of the most inferior aspect of the complex ventral hernia with thickness measuring up to 13 mm similar to prior examination.  Extensive calcified and soft tissue mass within the left lower quadrant decrease in size now measuring 2.6 x 2.3 cm previously 3.0 x 2.5 cm.  Partially calcified mass involving the right rectus sheath measuring 3.1 x  2.7 cm previously measured 3.3 x 2.9 cm.  Densely calcified mass on measuring 2.1 x 1.6 cm unchanged previously measured 2 x 1.7 cm.  Right external iliac chain lymph node measuring 9 mm previously measured 5 mm.  · 8/2/2019 cycle 7 chemotherapy given.  · 8/30/2019-cycle 8 chemotherapy with carboplatin and doxorubicin with Neulasta support given.    2. Deep vein thrombosis of left upper extremity diagnosis established in October of 2013.  · 10/16/13 - Venous Doppler of left upper extremity.  Impression:  Deep vein thrombosis involving the subclavian, axillary and brachial veins on the left side, superficial vein thrombosis involving the left basilic vein.  · 10/16/13 - Order written for Lovenox 120 mg subcutaneously daily until INR is in therapeutic range.  Order written for Coumadin 5 mg p.o. daily.  The patient is to follow PT and INR protocol.  · 5/20/16 - Venous Doppler bilateral lower extremities normal.  · 7/30/19 - Patient continued on Coumadin following the INR protocol.     3. Anemia diagnosis established in November 2013 and pernicious anemia diagnosis established March 2016.   · 11/15/13 - Hemoglobin is 7.8.  · 12/2/13 - Orders written to start Aranesp 200 mg subcutaneous every two weeks due to low hemoglobin secondary to chemo induced anemia.  Hemoglobin is 9.7.  Anemia workup is ordered.  · 12/03/13 - Ferritin 179.8 (N), folic acid 8.3 (N), vitamin B12 314, iron 104 (N), TIBC 298 (N), iron saturation 35 (N), Reticulocyte count 0.88 (N).  EPO level 124 (N).  Haptoglobin 22 (H).  · 10/22/14 - Hemoglobin 12.5, hematocrit 37.3, MCV 91.6.  · 10/23/14 - Anemia resolved.   · 3/8/16 - WBC 5.8, hemoglobin 11.7, MCV 90.3, platelet count 245,000. Vitamin B12 of 189 (180-914), ferritin 61 (), iron 91 (), TIBC 345 (228-428).   · 3/15/16 -  ().        · 3/22/16 - Intrinsic factor antibody positive, antiparietal antibody negative. Vitamin B12 at 1000 mcg IM weekly started, but the patient  after receiving first dose on 3/22/16 did not come back for injections until 4/13/16.   · 4/13/16 - WBC 5.1, hemoglobin 12.5, MCV 87.9, platelet count 201,000. Patient given B12 injection and then continued at home.   · 5/10/16 - WBC 5.1, hemoglobin 12.5, platelet count 201,000.   · 6/14/16 - Monthly Vitamin B12 injections continued at home.   · 7/11/16 - WBC 5.48, hemoglobin 12.7, MCV 86.2, platelet count 200,000.   · 8/16/16 - Patient continued on monthly Vitamin B12 injections at home.   · 1/5/17 - WBC 2.6 with 38% neutrophils, 56% lymphocytes, 5% monocytes, hemoglobin 10.5, .3, platelet count 63,000. Patient continued on Vitamin B12 injections 1000 mcg IM monthly at home.   · 3/20/17 - Retic 0.41 (0.5-1.5), creatinine 1.0 (0.4-1.0). Iron 12 (), TIBC 270 (228-428), ferritin 259 (), haptoglobin 340 (), TSH 0.43 (0.34-5.6), folate 6.0 (5.9-24.8). Serum protein electrophoresis revealed decreased gammaglobulins. Serum EDU had no monoclonal gammopathy identified. Stool Hemoccult was negative.   · 6/8/17 - WBC 6.3, hemoglobin 8.3, MCV 92.4, platelet count 50,000. Procrit 40,000 units subq weekly added for chemotherapy-induced anemia. Patient continued on Vitamin B12 at 1000 mcg IM monthly at home.   · 7/5/17 - Iron 33 (), TIBC 328 (228-428), ferritin 211 (), TSH 2.46 (0.34-5.6).       · 7/31/17 - WBC 5.0, hemoglobin 11.2, MCV 89.7, platelet count 217,000. We will continue with the Procrit 40,000 units subq weekly for chemo-induced anemia when the hemoglobin falls below 10.0 and the patient is also to continue with the Vitamin B12 at 1000 mcg IM monthly at home. Creatinine 1.24 (0.5-0.99).    · 8/28/17 - WBC 9.6, hemoglobin 8.7, MCV 93.7, platelet count 133,000. Patient is to continue with the Procrit 40,000 units subq weekly and will receive that today. She is also to continue with the Vitamin B12 at 1000 mcg IM monthly at home.  · 10/16/17 - WBC 7.5, hemoglobin 9.6, MCV 98.4,  platelet count 195,000. Patient is to continue with Procrit 40,000 units subq weekly and will receive Procrit today.   · 1/1/18 - Creatinine 1.3 (0.4-1.0).    · 2/19/18 - Procrit given for hemoglobin 9.9.   · 2/26/18 - Continue Procrit 40,000 units weekly p.r.n. hemoglobin <10. Continue Vitamin B12 at 1000 mcg IM monthly at home.   · 3/26/18 - Hemoglobin 8.3. Procrit dose increased to 60,000 units weekly. Iron 29 (), TIBC 306 (228-428), and iron sat 9% (15-50). Iron 29 (), TIBC 306 (228-428), iron saturation 9% (15-50).   · 3/27/18 - Order written to start Ferrous sulfate 325 mg p.o. b.i.d.    · 4/2/18 - Stool heme negative x3.   · 4/30/18 - Patient had not started Ferrous sulfate 325 mg p.o. b.i.d. and verbalized understanding to do so and to notify the office if side effects are not tolerable.   · 5/24/18 - Patient was hospitalized at [facility] between 5/24/18 and 5/26/18 with dark tarry stool. INR was subtherapeutic. She was given IV Protonix and Protonix 40 mg daily. She underwent an EGD by David Rogers M.D. revealing small hiatal hernia, small submucosal nodule near the cardia, erosive gastritis. Pathology on gastric antrum and body biopsy revealed iron pill gastritis and no evidence of Helicobacter pylori. She then underwent a colonoscopy revealing diverticulosis, hemorrhoids and surgical changes from previous resection. It was recommended to consider outpatient M2A if hemoglobin continued to drop.   · 6/4/18 - Order written to discontinue iron pills and to use Injectafer 750 mg IV days 1 and 8 for low iron sats. Order written for Procrit 40,000 units subq weekly. Iron 65 (), TIBC 328 (228-428), iron saturation 20% (15-50), ferritin 123 ().   · 6/29/18 - Discussed patient’s intolerance of oral iron due to gastritis. Oral iron discontinued with plans for Injectafer should iron level drop in the future.   · 11/7/18 - Patient claims not to be taking Vitamin B12 injections at home.  Asked to resume those.   · 12/3/18 - Patient reports she has not been taking her B12 injections for a couple of months. She needs some syringes and needles for that.   · 2/4/19 - Patient is uncertain if she is currently taking Ferrous Sulfate or not. Patient with history of intolerance and will follow hemoglobin.   · 5/8/19 - Ferritin 64 ().  · 8/27/2019- iron 52 (), iron saturation 14% (15-50), TIBC 364 (228-420), and ferritin 74 ().  Injectafer 750 mg IV day 1 and 8 due to oral iron intolerance ordered.  · 8/30/2019- Injectafer 750 mg IV day 1 given.  Hemoglobin 10.8.  At the end of the infusion heart rate was 48 and the patient reported mild dizziness.  Patient was sent to the ED for evaluation with symptoms resolving rapidly.  Chest x-ray and CT head were negative for acute findings.  · 9/4/2019-Z pack prescribed for URI without fever.  · 9/6/2019-Injectafer 750 mg IV day 8 given without adverse effects.  Hemoglobin 9.8.         Past Medical History:   Diagnosis Date   • Anemia 2013   • Clotting disorder (CMS/HCC) 2013    Low platelets from chemo   • Colon polyp 2013   • Deep vein thrombosis (CMS/HCC) 2013   • Diverticulosis 2013   • GERD (gastroesophageal reflux disease) 2016   • HL (hearing loss) 2016    I need hearing aids   • Hyperlipidemia 2013    Need my cholesterol rechecked   • Hypertension    • Low back pain 2013   • Neuromuscular disorder (CMS/HCC) 2015    Shingles, pinched nerves in my neck   • Osteopenia 2014   • Osteoporosis    • Ovarian cancer (CMS/HCC)    • Pneumonia 2010    Have had it several times   • Urinary tract infection 2013    Have had several utis       Past Surgical History:   Procedure Laterality Date   • COLON SURGERY     • COLONOSCOPY     • EXPLORATORY LAPAROTOMY     • HERNIA REPAIR     • HYSTERECTOMY     • OOPHORECTOMY           Current Outpatient Medications:   •  Acetaminophen 500 MG coapsule, Take 100 mg by mouth 4 (Four) Times a Day. As needed, Disp: , Rfl:   •  " atorvastatin (LIPITOR) 20 MG tablet, Take 20 mg by mouth every night at bedtime., Disp: , Rfl: 3  •  Cyanocobalamin (B-12 COMPLIANCE INJECTION) 1000 MCG/ML kit, Inject 1,000 mcg into the appropriate muscle as directed by prescriber Every 30 (Thirty) Days., Disp: , Rfl:   •  famotidine (PEPCID) 40 MG tablet, TAKE 1 TABLET BY MOUTH EVERY MORNING BEFORE BREAKFAST, Disp: 90 tablet, Rfl: 1  •  hydrochlorothiazide (HYDRODIURIL) 25 MG tablet, Take 25 mg by mouth Daily., Disp: , Rfl:   •  KLOR-CON 20 MEQ CR tablet, TAKE 3 TABLETS BY MOUTH EVERY MORNING AND TAKE 2 TABLETS AT BEDTIME, Disp: 450 tablet, Rfl: 1  •  lisinopril (PRINIVIL,ZESTRIL) 20 MG tablet, Take 20 mg by mouth Daily., Disp: , Rfl:   •  LYRICA 100 MG capsule, Take 1 capsule by mouth 3 (Three) Times a Day., Disp: 90 capsule, Rfl: 1  •  magnesium oxide (MAG-OX) 400 MG tablet, Take 1 tablet by mouth 2 (Two) Times a Day., Disp: 60 tablet, Rfl: 3  •  ondansetron ODT (ZOFRAN-ODT) 8 MG disintegrating tablet, PLACE 1 TABLET IN MOUTH TO DISSOLVE EVERY 8 HOURS AS NEEDED FOR NAUSEA, Disp: 20 tablet, Rfl: 3  •  oxyCODONE (ROXICODONE) 10 MG tablet, Take 1 tablet by mouth 3 (Three) Times a Day As Needed for Moderate Pain ., Disp: 90 tablet, Rfl: 0  •  promethazine (PHENERGAN) 25 MG tablet, Take 1 tablet by mouth Every 6 (Six) Hours As Needed for Nausea or Vomiting., Disp: 12 tablet, Rfl: 0  •  sertraline (ZOLOFT) 50 MG tablet, TAKE 1 TABLET BY MOUTH EVERY EVENING BEFORE BED, Disp: 90 tablet, Rfl: 1  •  Syringe/Needle, Disp, (B-D 3CC LUER-JESSICA SYR 23GX1\") 23G X 1\" 3 ML misc, BD LUER-JESSICA SYRINGE 23G X 1\" 3 ML, Disp: , Rfl:   •  temazepam (RESTORIL) 30 MG capsule, Take 1 capsule by mouth At Night As Needed for Sleep., Disp: 30 capsule, Rfl: 0  •  triamterene-hydrochlorothiazide (DYAZIDE) 37.5-25 MG per capsule, TAKE 1 CAPSULE BY MOUTH EVERY DAY, Disp: 90 capsule, Rfl: 1  •  valACYclovir (VALTREX) 500 MG tablet, Take 1,000 mg by mouth Daily., Disp: , Rfl:   •  WARFARIN SODIUM " PO, Take 5 mg by mouth Daily. 5.5mg DAILY, Disp: , Rfl: 3  •  azithromycin (ZITHROMAX) 250 MG tablet, Take 2 tablets the first day, then 1 tablet daily for 4 days., Disp: 6 tablet, Rfl: 0  •  warfarin (COUMADIN) 1 MG tablet, TAKE 1 TABLET BY MOUTH EVERY DAY AS DIRECTED, Disp: 45 tablet, Rfl: 2    Allergies   Allergen Reactions   • Morphine Nausea Only and Hives   • Penicillin V Potassium Rash   • Sulfa Antibiotics Rash   • Levaquin [Levofloxacin] Nausea Only and Dizziness     When taken with Coumadin   • Amoxicillin Rash   • Norco [Hydrocodone-Acetaminophen] Rash       Family History   Problem Relation Age of Onset   • Hypertension Mother    • Cancer Father    • Hypertension Father    • Hypertension Sister    • Hypertension Brother    • Stroke Brother        Cancer-related family history includes Cancer in her father.    Social History     Tobacco Use   • Smoking status: Never Smoker   • Smokeless tobacco: Never Used   Substance Use Topics   • Alcohol use: No     Frequency: Never   • Drug use: No       I have reviewed the history of present illness, past medical history, family history, social history, lab results, all notes and other records since the patient was last seen on 8/27/19.    SUBJECTIVE:  Pt is here today for a follow up visit. She denies any fever, chills, headaches, dizziness or swelling. She states she does not have a lot of energy. She states her pain is under control. Pt states she is taking Coumadin 5.5 mg daily. She states she is due for chemo this Friday, 9/27/19.      Gina Gonzalez CMA (AAMA) was present during office visit.          REVIEW OF SYSTEMS:  Review of Systems   Constitutional: Positive for fatigue. Negative for chills, diaphoresis, fever and unexpected weight change.   HENT: Negative for congestion, ear pain, mouth sores, nosebleeds and sore throat.    Eyes: Negative.  Negative for photophobia and visual disturbance.   Respiratory: Negative for cough, shortness of breath, wheezing  "and stridor.    Cardiovascular: Negative for chest pain, palpitations and leg swelling.   Gastrointestinal: Negative for abdominal pain, blood in stool, constipation, diarrhea, nausea and vomiting.   Endocrine: Negative for cold intolerance and heat intolerance.   Genitourinary: Negative for dysuria and hematuria.   Musculoskeletal: Negative for arthralgias, joint swelling and neck stiffness.   Skin: Negative for color change, rash and wound.   Neurological: Negative for seizures, syncope, numbness and headaches.   Hematological: Negative for adenopathy. Does not bruise/bleed easily.        No obvious bleeding   Psychiatric/Behavioral: Negative for agitation, confusion and hallucinations. The patient is not nervous/anxious.    All other systems reviewed and are negative.      OBJECTIVE:    Vitals:    09/25/19 1156   BP: 121/75   Pulse: 62   Resp: 20   Temp: 98.1 °F (36.7 °C)   Weight: 81.6 kg (179 lb 12.8 oz)   Height: 165.1 cm (65\")   PainSc: 0-No pain     ECOG  (1) Restricted in physically strenuous activity, ambulatory and able to do work of light nature    Physical Exam   Constitutional: She is oriented to person, place, and time. She appears well-developed and well-nourished. No distress.   Alone   HENT:   Head: Normocephalic and atraumatic.   Nose: Nose normal.   Mouth/Throat: Oropharynx is clear and moist. No oropharyngeal exudate.   Dental fillings and torus palatinus   Eyes: Conjunctivae, EOM and lids are normal. Pupils are equal, round, and reactive to light. Right eye exhibits no discharge. Left eye exhibits no discharge. No scleral icterus.   Eye glasses present.    Neck: Normal range of motion. Neck supple. No thyromegaly present.   Cardiovascular: Normal rate, regular rhythm, normal heart sounds and intact distal pulses. Exam reveals no gallop and no friction rub.   No murmur heard.  Pulmonary/Chest: Effort normal and breath sounds normal. No stridor. No respiratory distress. She has no wheezes.   Left " chest wall port.    Abdominal: Soft. Normal appearance and bowel sounds are normal. She exhibits no distension and no mass. There is no tenderness. There is no rebound and no guarding.   Genitourinary:   Genitourinary Comments: Deferred.   Musculoskeletal: Normal range of motion. She exhibits no edema, tenderness or deformity.   Lymphadenopathy:     She has no cervical adenopathy.     She has no axillary adenopathy.        Right: No supraclavicular adenopathy present.        Left: No supraclavicular adenopathy present.   Neurological: She is alert and oriented to person, place, and time. She exhibits normal muscle tone. Coordination normal.   Skin: Skin is warm and dry. Capillary refill takes less than 2 seconds. No bruising, no petechiae and no rash noted. She is not diaphoretic. No erythema. No pallor.   Psychiatric: She has a normal mood and affect. Her speech is normal and behavior is normal. Judgment and thought content normal. Cognition and memory are normal.   Nursing note and vitals reviewed.    RECENT LABS  WBC   Date Value Ref Range Status   09/25/2019 3.00 (L) 3.40 - 10.80 10*3/mm3 Final     RBC   Date Value Ref Range Status   09/25/2019 2.99 (L) 3.77 - 5.28 10*6/mm3 Final     Hemoglobin   Date Value Ref Range Status   09/25/2019 10.3 (L) 12.0 - 15.9 g/dL Final     Hematocrit   Date Value Ref Range Status   09/25/2019 32.2 (L) 34.0 - 46.6 % Final     MCV   Date Value Ref Range Status   09/25/2019 107.7 (H) 79.0 - 97.0 fL Final     MCH   Date Value Ref Range Status   09/25/2019 34.4 (H) 26.6 - 33.0 pg Final     MCHC   Date Value Ref Range Status   09/25/2019 32.0 31.5 - 35.7 g/dL Final     RDW   Date Value Ref Range Status   09/25/2019 18.5 (H) 12.3 - 15.4 % Final     RDW-SD   Date Value Ref Range Status   09/25/2019 70.3 (H) 37.0 - 54.0 fl Final     MPV   Date Value Ref Range Status   09/25/2019 10.5 6.0 - 12.0 fL Final     Platelets   Date Value Ref Range Status   09/25/2019 154 140 - 450 10*3/mm3 Final      Neutrophil %   Date Value Ref Range Status   09/25/2019 31.9 (L) 42.7 - 76.0 % Final     Lymphocyte %   Date Value Ref Range Status   09/25/2019 44.7 19.6 - 45.3 % Final     Monocyte %   Date Value Ref Range Status   09/25/2019 20.7 (H) 5.0 - 12.0 % Final     Eosinophil %   Date Value Ref Range Status   09/25/2019 2.0 0.3 - 6.2 % Final     Basophil %   Date Value Ref Range Status   09/25/2019 0.7 0.0 - 1.5 % Final     Neutrophils, Absolute   Date Value Ref Range Status   09/25/2019 0.96 (L) 1.70 - 7.00 10*3/mm3 Final     Lymphocytes, Absolute   Date Value Ref Range Status   09/25/2019 1.34 0.70 - 3.10 10*3/mm3 Final     Monocytes, Absolute   Date Value Ref Range Status   09/25/2019 0.62 0.10 - 0.90 10*3/mm3 Final     Eosinophils, Absolute   Date Value Ref Range Status   09/25/2019 0.06 0.00 - 0.40 10*3/mm3 Final     Basophils, Absolute   Date Value Ref Range Status   09/25/2019 0.02 0.00 - 0.20 10*3/mm3 Final     nRBC   Date Value Ref Range Status   08/30/2019 0.1 0.0 - 0.2 /100 WBC Final       Lab Results   Component Value Date    GLUCOSE 98 09/06/2019    BUN 16 09/06/2019    CREATININE 0.50 (L) 09/06/2019    EGFRIFNONA 72 09/06/2019    EGFRIFAFRI >60 06/07/2019    BCR 20.0 09/06/2019    K 3.9 09/06/2019    CO2 24.0 09/06/2019    CALCIUM 8.6 (L) 09/06/2019    ALBUMIN 3.40 (L) 09/06/2019    LABIL2 1.5 06/07/2019    AST 23 09/06/2019    ALT 11 (L) 09/06/2019       ASSESSMENT:    Malignant neoplasm of right ovary (CMS/HCC)  - CBC & Differential  - CBC & Differential  - CBC Auto Differential    Hypomagnesemia    Other iron deficiency anemia  - CBC & Differential  - Iron Profile  - Ferritin    Malabsorption due to intolerance, not elsewhere classified  - CBC & Differential    Chronic deep vein thrombosis (DVT) of left upper extremity, unspecified vein (CMS/HCC)  - CBC & Differential    Pernicious anemia  - CBC & Differential    Pancytopenia due to antineoplastic chemotherapy (CMS/HCC)  - CBC &  Differential    Encounter for antineoplastic chemotherapy  - CBC & Differential    Monitoring for long-term anticoagulant use    Since her last visit she has received another cycle of chemotherapy and continues to tolerate it well with some fatigue.  She has not been scheduled for the echocardiogram ordered with her last visit.  This will be scheduled ASAP and in the meantime she will proceed with chemotherapy as scheduled on 9/27/2017.  Her heart rate dropped post the first Injectafer infusion leading to an ED visit where her symptoms resolved quickly.  She tolerated the second infusion without any events.  Her upper respiratory infection symptoms had improved after a Z-Victoriano.  Her INR has been elevated likely due to antibiotic use and she was sent to the ED again on 9/2/2019 where INR was done.     PLAN:  Continue chemotherapy as scheduled 9/27/2019.  Echocardiogram to be scheduled ASAP.  Continue IV magnesium replacement.   Continue warfarin with close following of INR is in dose adjustments accordingly.  Iron studies today and plan Retacrit if iron replaced and hemoglobin drops below 10 again.  Continue vitamin B12 1000 mcg IM monthly at home.         I have reviewed labs results, imaging, vitals, and medications with the patient today and have reviewed information entered by Marissa Ledesma CMA (AAMA).  Will follow up in one month with the MD and a CBC.      Patient verbalized understanding and is in agreement of the above plan.    Electronically signed by LEWIS Espinal, 09/25/19, 1:36 PM.  Much of the above report is an electronic transcription//translation of the spoken language to printed text using Dragon Software. As such, the subtleties and finesse of the spoken language may permit erroneous, or at times, nonsensical words or phrases to be inadvertently transcribed; thus changes may be made at a later date to rectify these errors.

## 2019-09-25 ENCOUNTER — HOSPITAL ENCOUNTER (OUTPATIENT)
Dept: ONCOLOGY | Facility: HOSPITAL | Age: 64
Discharge: HOME | End: 2019-09-25
Admitting: NURSE PRACTITIONER

## 2019-09-25 ENCOUNTER — OFFICE VISIT (OUTPATIENT)
Dept: ONCOLOGY | Facility: CLINIC | Age: 64
End: 2019-09-25

## 2019-09-25 ENCOUNTER — LAB (OUTPATIENT)
Dept: LAB | Facility: HOSPITAL | Age: 64
End: 2019-09-25

## 2019-09-25 ENCOUNTER — OFFICE VISIT (OUTPATIENT)
Dept: LAB | Facility: HOSPITAL | Age: 64
End: 2019-09-25

## 2019-09-25 VITALS
HEART RATE: 62 BPM | HEIGHT: 65 IN | BODY MASS INDEX: 29.96 KG/M2 | WEIGHT: 179.8 LBS | TEMPERATURE: 98.1 F | DIASTOLIC BLOOD PRESSURE: 75 MMHG | SYSTOLIC BLOOD PRESSURE: 121 MMHG | RESPIRATION RATE: 20 BRPM

## 2019-09-25 VITALS — RESPIRATION RATE: 18 BRPM | HEIGHT: 65 IN | BODY MASS INDEX: 29.97 KG/M2 | WEIGHT: 179.9 LBS | TEMPERATURE: 98.3 F

## 2019-09-25 DIAGNOSIS — T45.1X5A PANCYTOPENIA DUE TO ANTINEOPLASTIC CHEMOTHERAPY (HCC): ICD-10-CM

## 2019-09-25 DIAGNOSIS — I82.722 CHRONIC DEEP VEIN THROMBOSIS (DVT) OF LEFT UPPER EXTREMITY, UNSPECIFIED VEIN (HCC): Primary | ICD-10-CM

## 2019-09-25 DIAGNOSIS — Z51.11 ENCOUNTER FOR ANTINEOPLASTIC CHEMOTHERAPY: ICD-10-CM

## 2019-09-25 DIAGNOSIS — D50.8 OTHER IRON DEFICIENCY ANEMIA: ICD-10-CM

## 2019-09-25 DIAGNOSIS — Z51.81 MONITORING FOR LONG-TERM ANTICOAGULANT USE: ICD-10-CM

## 2019-09-25 DIAGNOSIS — C56.1 MALIGNANT NEOPLASM OF RIGHT OVARY (HCC): Primary | ICD-10-CM

## 2019-09-25 DIAGNOSIS — Z79.01 MONITORING FOR LONG-TERM ANTICOAGULANT USE: ICD-10-CM

## 2019-09-25 DIAGNOSIS — K90.49 MALABSORPTION DUE TO INTOLERANCE, NOT ELSEWHERE CLASSIFIED: ICD-10-CM

## 2019-09-25 DIAGNOSIS — D51.0 PERNICIOUS ANEMIA: ICD-10-CM

## 2019-09-25 DIAGNOSIS — D61.810 PANCYTOPENIA DUE TO ANTINEOPLASTIC CHEMOTHERAPY (HCC): ICD-10-CM

## 2019-09-25 DIAGNOSIS — C56.1 MALIGNANT NEOPLASM OF RIGHT OVARY (HCC): ICD-10-CM

## 2019-09-25 DIAGNOSIS — E83.42 HYPOMAGNESEMIA: ICD-10-CM

## 2019-09-25 DIAGNOSIS — I82.722 CHRONIC DEEP VEIN THROMBOSIS (DVT) OF LEFT UPPER EXTREMITY, UNSPECIFIED VEIN (HCC): ICD-10-CM

## 2019-09-25 LAB
BASOPHILS # BLD AUTO: 0.02 10*3/MM3 (ref 0–0.2)
BASOPHILS NFR BLD AUTO: 0.7 % (ref 0–1.5)
DEPRECATED RDW RBC AUTO: 70.3 FL (ref 37–54)
EOSINOPHIL # BLD AUTO: 0.06 10*3/MM3 (ref 0–0.4)
EOSINOPHIL NFR BLD AUTO: 2 % (ref 0.3–6.2)
ERYTHROCYTE [DISTWIDTH] IN BLOOD BY AUTOMATED COUNT: 18.5 % (ref 12.3–15.4)
FERRITIN SERPL-MCNC: 694 NG/ML (ref 11–307)
HCT VFR BLD AUTO: 32.2 % (ref 34–46.6)
HGB BLD-MCNC: 10.3 G/DL (ref 12–15.9)
INR PPP: 3.3
IRON 24H UR-MRATE: 85 MCG/DL (ref 28–170)
IRON SATN MFR SERPL: 25 % (ref 15–50)
LYMPHOCYTES # BLD AUTO: 1.34 10*3/MM3 (ref 0.7–3.1)
LYMPHOCYTES NFR BLD AUTO: 44.7 % (ref 19.6–45.3)
MCH RBC QN AUTO: 34.4 PG (ref 26.6–33)
MCHC RBC AUTO-ENTMCNC: 32 G/DL (ref 31.5–35.7)
MCV RBC AUTO: 107.7 FL (ref 79–97)
MONOCYTES # BLD AUTO: 0.62 10*3/MM3 (ref 0.1–0.9)
MONOCYTES NFR BLD AUTO: 20.7 % (ref 5–12)
NEUTROPHILS # BLD AUTO: 0.96 10*3/MM3 (ref 1.7–7)
NEUTROPHILS NFR BLD AUTO: 31.9 % (ref 42.7–76)
PLATELET # BLD AUTO: 154 10*3/MM3 (ref 140–450)
PMV BLD AUTO: 10.5 FL (ref 6–12)
RBC # BLD AUTO: 2.99 10*6/MM3 (ref 3.77–5.28)
TIBC SERPL-MCNC: 335 MCG/DL (ref 228–428)
TRANSFERRIN SERPL-MCNC: 239 MG/DL (ref 200–360)
WBC NRBC COR # BLD: 3 10*3/MM3 (ref 3.4–10.8)

## 2019-09-25 PROCEDURE — 82728 ASSAY OF FERRITIN: CPT | Performed by: NURSE PRACTITIONER

## 2019-09-25 PROCEDURE — 85610 PROTHROMBIN TIME: CPT

## 2019-09-25 PROCEDURE — 84466 ASSAY OF TRANSFERRIN: CPT | Performed by: NURSE PRACTITIONER

## 2019-09-25 PROCEDURE — 99215 OFFICE O/P EST HI 40 MIN: CPT | Performed by: NURSE PRACTITIONER

## 2019-09-25 PROCEDURE — 85025 COMPLETE CBC W/AUTO DIFF WBC: CPT | Performed by: NURSE PRACTITIONER

## 2019-09-25 PROCEDURE — 83540 ASSAY OF IRON: CPT | Performed by: NURSE PRACTITIONER

## 2019-09-25 PROCEDURE — 36415 COLL VENOUS BLD VENIPUNCTURE: CPT | Performed by: NURSE PRACTITIONER

## 2019-09-25 RX ORDER — DEXTROSE MONOHYDRATE 50 MG/ML
250 INJECTION, SOLUTION INTRAVENOUS ONCE
Status: CANCELLED | OUTPATIENT
Start: 2019-09-27

## 2019-09-25 RX ORDER — PALONOSETRON 0.05 MG/ML
0.25 INJECTION, SOLUTION INTRAVENOUS ONCE
Status: CANCELLED | OUTPATIENT
Start: 2019-09-27

## 2019-09-25 RX ORDER — FAMOTIDINE 10 MG/ML
20 INJECTION, SOLUTION INTRAVENOUS AS NEEDED
Status: CANCELLED | OUTPATIENT
Start: 2019-09-27

## 2019-09-25 RX ORDER — DIPHENHYDRAMINE HYDROCHLORIDE 50 MG/ML
50 INJECTION INTRAMUSCULAR; INTRAVENOUS AS NEEDED
Status: CANCELLED | OUTPATIENT
Start: 2019-09-27

## 2019-09-26 ENCOUNTER — ANTICOAGULATION VISIT (OUTPATIENT)
Dept: ONCOLOGY | Facility: CLINIC | Age: 64
End: 2019-09-26

## 2019-09-27 ENCOUNTER — HOSPITAL ENCOUNTER (OUTPATIENT)
Dept: ONCOLOGY | Facility: HOSPITAL | Age: 64
Setting detail: INFUSION SERIES
Discharge: HOME | End: 2019-09-27

## 2019-09-27 VITALS
SYSTOLIC BLOOD PRESSURE: 125 MMHG | RESPIRATION RATE: 18 BRPM | HEART RATE: 62 BPM | BODY MASS INDEX: 30.32 KG/M2 | TEMPERATURE: 97.6 F | DIASTOLIC BLOOD PRESSURE: 76 MMHG | HEIGHT: 65 IN | WEIGHT: 182 LBS

## 2019-09-27 DIAGNOSIS — C56.1 MALIGNANT NEOPLASM OF RIGHT OVARY (HCC): ICD-10-CM

## 2019-09-27 DIAGNOSIS — E83.42 HYPOMAGNESEMIA: Primary | ICD-10-CM

## 2019-09-27 LAB
ALBUMIN SERPL-MCNC: 3.5 G/DL (ref 3.5–4.8)
ALBUMIN/GLOB SERPL: 1.5 G/DL (ref 1–1.7)
ALP SERPL-CCNC: 105 U/L (ref 32–91)
ALT SERPL W P-5'-P-CCNC: 14 U/L (ref 14–54)
ANION GAP SERPL CALCULATED.3IONS-SCNC: 14 MMOL/L (ref 5–15)
AST SERPL-CCNC: 27 U/L (ref 15–41)
BASOPHILS # BLD AUTO: 0.02 10*3/MM3 (ref 0–0.2)
BASOPHILS NFR BLD AUTO: 0.6 % (ref 0–1.5)
BILIRUB SERPL-MCNC: 0.3 MG/DL (ref 0.3–1.2)
BUN BLD-MCNC: 13 MG/DL (ref 8–20)
BUN/CREAT SERPL: 14.4 (ref 5.4–26.2)
CALCIUM SPEC-SCNC: 9.1 MG/DL (ref 8.9–10.3)
CHLORIDE SERPL-SCNC: 104 MMOL/L (ref 101–111)
CO2 SERPL-SCNC: 25 MMOL/L (ref 22–32)
CREAT BLD-MCNC: 0.9 MG/DL (ref 0.4–1)
CREAT BLDA-MCNC: 0.8 MG/DL (ref 0.6–1.3)
DEPRECATED RDW RBC AUTO: 69.8 FL (ref 37–54)
EOSINOPHIL # BLD AUTO: 0.05 10*3/MM3 (ref 0–0.4)
EOSINOPHIL NFR BLD AUTO: 1.5 % (ref 0.3–6.2)
ERYTHROCYTE [DISTWIDTH] IN BLOOD BY AUTOMATED COUNT: 18.3 % (ref 12.3–15.4)
GFR SERPL CREATININE-BSD FRML MDRD: 63 ML/MIN/1.73
GLOBULIN UR ELPH-MCNC: 2.4 GM/DL (ref 2.5–3.8)
GLUCOSE BLD-MCNC: 83 MG/DL (ref 65–99)
HCT VFR BLD AUTO: 32.9 % (ref 34–46.6)
HGB BLD-MCNC: 10.6 G/DL (ref 12–15.9)
LYMPHOCYTES # BLD AUTO: 1.22 10*3/MM3 (ref 0.7–3.1)
LYMPHOCYTES NFR BLD AUTO: 36.2 % (ref 19.6–45.3)
MAGNESIUM SERPL-MCNC: 1.4 MG/DL (ref 1.8–2.5)
MCH RBC QN AUTO: 34.8 PG (ref 26.6–33)
MCHC RBC AUTO-ENTMCNC: 32.2 G/DL (ref 31.5–35.7)
MCV RBC AUTO: 107.9 FL (ref 79–97)
MONOCYTES # BLD AUTO: 0.63 10*3/MM3 (ref 0.1–0.9)
MONOCYTES NFR BLD AUTO: 18.7 % (ref 5–12)
NEUTROPHILS # BLD AUTO: 1.45 10*3/MM3 (ref 1.7–7)
NEUTROPHILS NFR BLD AUTO: 43 % (ref 42.7–76)
PLATELET # BLD AUTO: 165 10*3/MM3 (ref 140–450)
PMV BLD AUTO: 10.9 FL (ref 6–12)
POTASSIUM BLD-SCNC: 4 MMOL/L (ref 3.6–5.1)
PROT SERPL-MCNC: 5.9 G/DL (ref 6.1–7.9)
RBC # BLD AUTO: 3.05 10*6/MM3 (ref 3.77–5.28)
SODIUM BLD-SCNC: 139 MMOL/L (ref 136–144)
WBC NRBC COR # BLD: 3.37 10*3/MM3 (ref 3.4–10.8)

## 2019-09-27 PROCEDURE — 96367 TX/PROPH/DG ADDL SEQ IV INF: CPT | Performed by: INTERNAL MEDICINE

## 2019-09-27 PROCEDURE — 83735 ASSAY OF MAGNESIUM: CPT | Performed by: NURSE PRACTITIONER

## 2019-09-27 PROCEDURE — 96375 TX/PRO/DX INJ NEW DRUG ADDON: CPT | Performed by: INTERNAL MEDICINE

## 2019-09-27 PROCEDURE — 25010000002 MAGNESIUM SULFATE 2 GM/50ML SOLUTION: Performed by: NURSE PRACTITIONER

## 2019-09-27 PROCEDURE — 96415 CHEMO IV INFUSION ADDL HR: CPT | Performed by: INTERNAL MEDICINE

## 2019-09-27 PROCEDURE — 96377 APPLICATON ON-BODY INJECTOR: CPT

## 2019-09-27 PROCEDURE — 85025 COMPLETE CBC W/AUTO DIFF WBC: CPT | Performed by: INTERNAL MEDICINE

## 2019-09-27 PROCEDURE — 25010000002 PALONOSETRON 0.25 MG/5ML SOLUTION PREFILLED SYRINGE: Performed by: INTERNAL MEDICINE

## 2019-09-27 PROCEDURE — 96417 CHEMO IV INFUS EACH ADDL SEQ: CPT | Performed by: INTERNAL MEDICINE

## 2019-09-27 PROCEDURE — 80053 COMPREHEN METABOLIC PANEL: CPT | Performed by: INTERNAL MEDICINE

## 2019-09-27 PROCEDURE — 25010000002 DEXAMETHASONE SODIUM PHOSPHATE 20 MG/5ML SOLUTION: Performed by: INTERNAL MEDICINE

## 2019-09-27 PROCEDURE — 96365 THER/PROPH/DIAG IV INF INIT: CPT | Performed by: INTERNAL MEDICINE

## 2019-09-27 PROCEDURE — 96413 CHEMO IV INFUSION 1 HR: CPT | Performed by: INTERNAL MEDICINE

## 2019-09-27 PROCEDURE — 25010000002 DOXORUBICIN LIPOSOMAL PER 10 MG: Performed by: INTERNAL MEDICINE

## 2019-09-27 PROCEDURE — 25010000002 CARBOPLATIN PER 50 MG: Performed by: INTERNAL MEDICINE

## 2019-09-27 PROCEDURE — 36591 DRAW BLOOD OFF VENOUS DEVICE: CPT

## 2019-09-27 PROCEDURE — 25010000002 FOSAPREPITANT PER 1 MG: Performed by: INTERNAL MEDICINE

## 2019-09-27 PROCEDURE — 25010000002 PEGFILGRASTIM 6 MG/0.6ML PREFILLED SYRINGE KIT: Performed by: INTERNAL MEDICINE

## 2019-09-27 PROCEDURE — 96377 APPLICATON ON-BODY INJECTOR: CPT | Performed by: INTERNAL MEDICINE

## 2019-09-27 PROCEDURE — 82565 ASSAY OF CREATININE: CPT

## 2019-09-27 RX ORDER — SODIUM CHLORIDE 0.9 % (FLUSH) 0.9 %
10 SYRINGE (ML) INJECTION AS NEEDED
Status: CANCELLED | OUTPATIENT
Start: 2019-09-27

## 2019-09-27 RX ORDER — DEXTROSE MONOHYDRATE 50 MG/ML
250 INJECTION, SOLUTION INTRAVENOUS ONCE
Status: DISCONTINUED | OUTPATIENT
Start: 2019-09-27 | End: 2019-09-28 | Stop reason: HOSPADM

## 2019-09-27 RX ORDER — SODIUM CHLORIDE 0.9 % (FLUSH) 0.9 %
10 SYRINGE (ML) INJECTION AS NEEDED
Status: DISCONTINUED | OUTPATIENT
Start: 2019-09-27 | End: 2019-09-28 | Stop reason: HOSPADM

## 2019-09-27 RX ORDER — MAGNESIUM SULFATE HEPTAHYDRATE 40 MG/ML
2 INJECTION, SOLUTION INTRAVENOUS ONCE
Status: COMPLETED | OUTPATIENT
Start: 2019-09-27 | End: 2019-09-27

## 2019-09-27 RX ORDER — PALONOSETRON HYDROCHLORIDE 0.05 MG/ML
0.25 INJECTION, SOLUTION INTRAVENOUS ONCE
Status: COMPLETED | OUTPATIENT
Start: 2019-09-27 | End: 2019-09-27

## 2019-09-27 RX ADMIN — PALONOSETRON 0.25 MG: 0.25 INJECTION, SOLUTION INTRAVENOUS at 11:45

## 2019-09-27 RX ADMIN — SODIUM CHLORIDE 100 ML: 900 INJECTION, SOLUTION INTRAVENOUS at 11:47

## 2019-09-27 RX ADMIN — DEXAMETHASONE SODIUM PHOSPHATE 8 MG: 4 INJECTION, SOLUTION INTRAMUSCULAR; INTRAVENOUS at 12:21

## 2019-09-27 RX ADMIN — DOXORUBICIN HYDROCHLORIDE 56 MG: 2 INJECTABLE, LIPOSOMAL INTRAVENOUS at 12:44

## 2019-09-27 RX ADMIN — PEGFILGRASTIM 6 MG: KIT SUBCUTANEOUS at 15:02

## 2019-09-27 RX ADMIN — MAGNESIUM SULFATE IN WATER 2 G: 40 INJECTION, SOLUTION INTRAVENOUS at 10:43

## 2019-09-27 RX ADMIN — CARBOPLATIN 588 MG: 10 INJECTION, SOLUTION INTRAVENOUS at 14:21

## 2019-09-27 NOTE — ADDENDUM NOTE
Encounter addended by: Katie Griffin RN on: 9/27/2019 5:31 PM   Actions taken: Charge Capture section accepted

## 2019-09-30 PROBLEM — T45.1X5A ANTINEOPLASTIC CHEMOTHERAPY INDUCED ANEMIA: Status: ACTIVE | Noted: 2019-09-30

## 2019-09-30 PROBLEM — D64.81 ANTINEOPLASTIC CHEMOTHERAPY INDUCED ANEMIA: Status: ACTIVE | Noted: 2019-09-30

## 2019-10-01 ENCOUNTER — HOSPITAL ENCOUNTER (OUTPATIENT)
Dept: CARDIOLOGY | Facility: HOSPITAL | Age: 64
Discharge: HOME | End: 2019-10-01
Admitting: INTERNAL MEDICINE

## 2019-10-01 VITALS
SYSTOLIC BLOOD PRESSURE: 130 MMHG | BODY MASS INDEX: 30.82 KG/M2 | WEIGHT: 185 LBS | HEIGHT: 65 IN | DIASTOLIC BLOOD PRESSURE: 82 MMHG

## 2019-10-01 DIAGNOSIS — D61.810 PANCYTOPENIA DUE TO ANTINEOPLASTIC CHEMOTHERAPY (HCC): ICD-10-CM

## 2019-10-01 DIAGNOSIS — T45.1X5A PANCYTOPENIA DUE TO ANTINEOPLASTIC CHEMOTHERAPY (HCC): ICD-10-CM

## 2019-10-01 LAB
BH CV ECHO MEAS - EF(MOD-BP): 68 %
BH CV ECHO MEAS - LA DIMENSION(2D): 4.3 CM
MAXIMAL PREDICTED HEART RATE: 156 BPM
STRESS TARGET HR: 133 BPM

## 2019-10-01 PROCEDURE — 93306 TTE W/DOPPLER COMPLETE: CPT | Performed by: INTERNAL MEDICINE

## 2019-10-01 PROCEDURE — 93306 TTE W/DOPPLER COMPLETE: CPT

## 2019-10-01 RX ORDER — TEMAZEPAM 30 MG/1
CAPSULE ORAL
Qty: 30 CAPSULE | Refills: 0 | Status: SHIPPED | OUTPATIENT
Start: 2019-10-01 | End: 2019-11-08

## 2019-10-03 DIAGNOSIS — E83.42 HYPOMAGNESEMIA: ICD-10-CM

## 2019-10-03 RX ORDER — MAGNESIUM SULFATE HEPTAHYDRATE 40 MG/ML
2 INJECTION, SOLUTION INTRAVENOUS ONCE
Status: CANCELLED | OUTPATIENT
Start: 2019-10-04

## 2019-10-03 RX ORDER — SODIUM CHLORIDE 9 MG/ML
250 INJECTION, SOLUTION INTRAVENOUS ONCE
Status: CANCELLED | OUTPATIENT
Start: 2019-10-04

## 2019-10-04 ENCOUNTER — HOSPITAL ENCOUNTER (OUTPATIENT)
Dept: ONCOLOGY | Facility: HOSPITAL | Age: 64
Discharge: HOME | End: 2019-10-04
Admitting: NURSE PRACTITIONER

## 2019-10-04 VITALS
HEART RATE: 64 BPM | BODY MASS INDEX: 30.12 KG/M2 | SYSTOLIC BLOOD PRESSURE: 119 MMHG | DIASTOLIC BLOOD PRESSURE: 60 MMHG | RESPIRATION RATE: 18 BRPM | TEMPERATURE: 98.4 F | WEIGHT: 181 LBS

## 2019-10-04 DIAGNOSIS — E83.42 HYPOMAGNESEMIA: ICD-10-CM

## 2019-10-04 DIAGNOSIS — C56.1 MALIGNANT NEOPLASM OF RIGHT OVARY (HCC): Primary | ICD-10-CM

## 2019-10-04 LAB
BASOPHILS # BLD AUTO: 0.01 10*3/MM3 (ref 0–0.2)
BASOPHILS NFR BLD AUTO: 0.2 % (ref 0–1.5)
DEPRECATED RDW RBC AUTO: 63.8 FL (ref 37–54)
EOSINOPHIL # BLD AUTO: 0.04 10*3/MM3 (ref 0–0.4)
EOSINOPHIL NFR BLD AUTO: 0.8 % (ref 0.3–6.2)
ERYTHROCYTE [DISTWIDTH] IN BLOOD BY AUTOMATED COUNT: 16.9 % (ref 12.3–15.4)
HCT VFR BLD AUTO: 30.1 % (ref 34–46.6)
HGB BLD-MCNC: 9.8 G/DL (ref 12–15.9)
LYMPHOCYTES # BLD AUTO: 0.91 10*3/MM3 (ref 0.7–3.1)
LYMPHOCYTES NFR BLD AUTO: 18.7 % (ref 19.6–45.3)
MAGNESIUM SERPL-MCNC: 1.4 MG/DL (ref 1.8–2.5)
MCH RBC QN AUTO: 34.5 PG (ref 26.6–33)
MCHC RBC AUTO-ENTMCNC: 32.6 G/DL (ref 31.5–35.7)
MCV RBC AUTO: 106 FL (ref 79–97)
MONOCYTES # BLD AUTO: 0.32 10*3/MM3 (ref 0.1–0.9)
MONOCYTES NFR BLD AUTO: 6.6 % (ref 5–12)
NEUTROPHILS # BLD AUTO: 3.59 10*3/MM3 (ref 1.7–7)
NEUTROPHILS NFR BLD AUTO: 73.7 % (ref 42.7–76)
PLATELET # BLD AUTO: 72 10*3/MM3 (ref 140–450)
PMV BLD AUTO: 11 FL (ref 6–12)
RBC # BLD AUTO: 2.84 10*6/MM3 (ref 3.77–5.28)
WBC NRBC COR # BLD: 4.87 10*3/MM3 (ref 3.4–10.8)

## 2019-10-04 PROCEDURE — 96367 TX/PROPH/DG ADDL SEQ IV INF: CPT | Performed by: INTERNAL MEDICINE

## 2019-10-04 PROCEDURE — 96365 THER/PROPH/DIAG IV INF INIT: CPT | Performed by: INTERNAL MEDICINE

## 2019-10-04 PROCEDURE — 85025 COMPLETE CBC W/AUTO DIFF WBC: CPT | Performed by: NURSE PRACTITIONER

## 2019-10-04 PROCEDURE — 25010000002 MAGNESIUM SULFATE 2 GM/50ML SOLUTION: Performed by: INTERNAL MEDICINE

## 2019-10-04 PROCEDURE — 83735 ASSAY OF MAGNESIUM: CPT | Performed by: INTERNAL MEDICINE

## 2019-10-04 RX ORDER — MAGNESIUM SULFATE HEPTAHYDRATE 40 MG/ML
2 INJECTION, SOLUTION INTRAVENOUS ONCE
Status: COMPLETED | OUTPATIENT
Start: 2019-10-04 | End: 2019-10-04

## 2019-10-04 RX ORDER — SODIUM CHLORIDE 0.9 % (FLUSH) 0.9 %
10 SYRINGE (ML) INJECTION AS NEEDED
Status: DISCONTINUED | OUTPATIENT
Start: 2019-10-04 | End: 2019-10-05 | Stop reason: HOSPADM

## 2019-10-04 RX ORDER — SODIUM CHLORIDE 0.9 % (FLUSH) 0.9 %
10 SYRINGE (ML) INJECTION AS NEEDED
Status: CANCELLED | OUTPATIENT
Start: 2019-10-04

## 2019-10-04 RX ADMIN — SODIUM CHLORIDE, PRESERVATIVE FREE 10 ML: 5 INJECTION INTRAVENOUS at 11:30

## 2019-10-04 RX ADMIN — MAGNESIUM SULFATE HEPTAHYDRATE 2 G: 40 INJECTION, SOLUTION INTRAVENOUS at 10:28

## 2019-10-04 RX ADMIN — Medication 500 UNITS: at 11:30

## 2019-10-07 ENCOUNTER — OFFICE VISIT (OUTPATIENT)
Dept: PAIN MEDICINE | Facility: CLINIC | Age: 64
End: 2019-10-07

## 2019-10-07 VITALS
DIASTOLIC BLOOD PRESSURE: 62 MMHG | OXYGEN SATURATION: 100 % | WEIGHT: 185 LBS | SYSTOLIC BLOOD PRESSURE: 98 MMHG | BODY MASS INDEX: 30.82 KG/M2 | TEMPERATURE: 98.2 F | HEART RATE: 59 BPM | RESPIRATION RATE: 16 BRPM | HEIGHT: 65 IN

## 2019-10-07 DIAGNOSIS — M79.604 LEG PAIN, BILATERAL: Primary | ICD-10-CM

## 2019-10-07 DIAGNOSIS — M79.605 LEG PAIN, BILATERAL: Primary | ICD-10-CM

## 2019-10-07 DIAGNOSIS — R52 PAIN: ICD-10-CM

## 2019-10-07 DIAGNOSIS — Z79.899 OTHER LONG TERM (CURRENT) DRUG THERAPY: ICD-10-CM

## 2019-10-07 PROCEDURE — G0463 HOSPITAL OUTPT CLINIC VISIT: HCPCS | Performed by: PHYSICAL MEDICINE & REHABILITATION

## 2019-10-07 PROCEDURE — 99213 OFFICE O/P EST LOW 20 MIN: CPT | Performed by: PHYSICAL MEDICINE & REHABILITATION

## 2019-10-07 RX ORDER — CYANOCOBALAMIN 1000 UG/ML
INJECTION, SOLUTION INTRAMUSCULAR; SUBCUTANEOUS
Refills: 3 | COMMUNITY
Start: 2019-09-18 | End: 2019-12-08

## 2019-10-07 RX ORDER — OXYCODONE HYDROCHLORIDE 10 MG/1
10 TABLET ORAL 3 TIMES DAILY PRN
Qty: 90 TABLET | Refills: 0 | Status: SHIPPED | OUTPATIENT
Start: 2019-10-07 | End: 2019-10-07 | Stop reason: SDUPTHER

## 2019-10-07 RX ORDER — WARFARIN SODIUM 5 MG/1
5 TABLET ORAL DAILY
Refills: 3 | COMMUNITY
Start: 2019-07-26 | End: 2019-10-07

## 2019-10-07 RX ORDER — OXYCODONE HYDROCHLORIDE 10 MG/1
10 TABLET ORAL 3 TIMES DAILY PRN
Qty: 90 TABLET | Refills: 0 | Status: SHIPPED | OUTPATIENT
Start: 2019-10-07 | End: 2020-01-06 | Stop reason: SDUPTHER

## 2019-10-07 NOTE — PROGRESS NOTES
"Subjective   Tahira Zimmerman is a 64 y.o. female.     neck and shoulder pain, all over aching \"bone\" due to chemo--also left rotator cuff tear  also broke out with shingles-under breast on back and on head. 9/10 at worst, 2/10 at best. Nonradiating. Always present, varies, interferes with daily activities. Imaging shows metastatic disease. Lengthy prior notes reviwed, treated for metastatic disease, failed Tramadol, cannot tolerate Hydrocodone, could tolerate Oxycodone 5mg, taking BID, also Lyrica 75mg BID. Started new chemo, worsening pain in b/l hips and legs, especially at night. Now taking Oxycodone 10mg TID prn and Lyrica 100mg BID with good relief. Started another new chemo with worsening pain, on a break while insurance approves pill form with improved pain. New chemo pill lowered Mg, holding it while getting Mg infusions. Shingles outbreak. Started new chemo, has aches controlled by current meds.         The following portions of the patient's history were reviewed and updated as appropriate: allergies, current medications, past family history, past medical history, past social history, past surgical history and problem list.    Review of Systems   Constitutional: Negative for chills, fatigue and fever.   HENT: Positive for hearing loss. Negative for trouble swallowing.    Eyes: Negative for visual disturbance.   Respiratory: Negative for shortness of breath.    Cardiovascular: Negative for chest pain.   Gastrointestinal: Positive for abdominal pain and nausea. Negative for constipation, diarrhea and vomiting.   Genitourinary: Negative for urinary incontinence.   Musculoskeletal: Positive for arthralgias, back pain and neck pain. Negative for joint swelling and myalgias.   Neurological: Positive for headache. Negative for dizziness, weakness and numbness.       Objective   Physical Exam   Constitutional: She is oriented to person, place, and time. She appears well-developed and well-nourished.   HENT: "   Head: Normocephalic and atraumatic.   Eyes: EOM are normal. Pupils are equal, round, and reactive to light.   Neck: Normal range of motion.   Cardiovascular: Normal rate, regular rhythm, normal heart sounds and intact distal pulses.   Pulmonary/Chest: Breath sounds normal.   Abdominal: Soft. Bowel sounds are normal. She exhibits no distension. There is no tenderness.   Neurological: She is alert and oriented to person, place, and time. She has normal strength and normal reflexes. She displays normal reflexes. A sensory deficit is present.   Decreased in b/l stocking distribution   Psychiatric: She has a normal mood and affect. Her behavior is normal. Thought content normal.         Assessment/Plan   Tahira was seen today for neck pain and leg pain.    Diagnoses and all orders for this visit:    Leg pain, bilateral    Pain    Other long term (current) drug therapy        [facility] reviewed, in order. Low risk. Repeat drug screen 2/14/19 in order.  Cont Oxycodone 10mg 1/2 - 1 tab TID prn, cannot tolerate Hydrocodone, failed Tramadol. May need to increase in future, possibly before next visit with starting IV chemo.  Increased to Lyrica 100mg TID for worsening pain. More effective than Gabapentin, already taking antidepressants so no plans for beginning Cymbalta.  Cont RxAlt shingles cream prn.  Cont other meds as prescribed.  No plans for procedures or TENS with metastatic disease.  RTC 34 months for f/u.

## 2019-10-09 DIAGNOSIS — E83.42 HYPOMAGNESEMIA: ICD-10-CM

## 2019-10-09 RX ORDER — MAGNESIUM SULFATE HEPTAHYDRATE 40 MG/ML
2 INJECTION, SOLUTION INTRAVENOUS ONCE
Status: CANCELLED | OUTPATIENT
Start: 2019-10-25

## 2019-10-09 RX ORDER — SODIUM CHLORIDE 9 MG/ML
250 INJECTION, SOLUTION INTRAVENOUS ONCE
Status: CANCELLED | OUTPATIENT
Start: 2019-10-25

## 2019-10-09 RX ORDER — SODIUM CHLORIDE 9 MG/ML
250 INJECTION, SOLUTION INTRAVENOUS ONCE
Status: CANCELLED | OUTPATIENT
Start: 2019-10-18

## 2019-10-09 RX ORDER — MAGNESIUM SULFATE HEPTAHYDRATE 40 MG/ML
2 INJECTION, SOLUTION INTRAVENOUS ONCE
Status: CANCELLED | OUTPATIENT
Start: 2019-10-18

## 2019-10-09 RX ORDER — MAGNESIUM SULFATE HEPTAHYDRATE 40 MG/ML
2 INJECTION, SOLUTION INTRAVENOUS ONCE
Status: CANCELLED | OUTPATIENT
Start: 2019-10-11

## 2019-10-09 RX ORDER — SODIUM CHLORIDE 9 MG/ML
250 INJECTION, SOLUTION INTRAVENOUS ONCE
Status: CANCELLED | OUTPATIENT
Start: 2019-10-11

## 2019-10-11 ENCOUNTER — HOSPITAL ENCOUNTER (OUTPATIENT)
Dept: ONCOLOGY | Facility: HOSPITAL | Age: 64
Setting detail: INFUSION SERIES
Discharge: HOME | End: 2019-10-11

## 2019-10-11 VITALS
TEMPERATURE: 98.4 F | HEART RATE: 49 BPM | DIASTOLIC BLOOD PRESSURE: 71 MMHG | SYSTOLIC BLOOD PRESSURE: 114 MMHG | WEIGHT: 189 LBS | BODY MASS INDEX: 31.45 KG/M2

## 2019-10-11 DIAGNOSIS — C56.1 MALIGNANT NEOPLASM OF RIGHT OVARY (HCC): ICD-10-CM

## 2019-10-11 DIAGNOSIS — E83.42 HYPOMAGNESEMIA: Primary | ICD-10-CM

## 2019-10-11 LAB — MAGNESIUM SERPL-MCNC: 1.3 MG/DL (ref 1.8–2.5)

## 2019-10-11 PROCEDURE — 83735 ASSAY OF MAGNESIUM: CPT | Performed by: NURSE PRACTITIONER

## 2019-10-11 PROCEDURE — 96365 THER/PROPH/DIAG IV INF INIT: CPT | Performed by: INTERNAL MEDICINE

## 2019-10-11 PROCEDURE — 25010000002 MAGNESIUM SULFATE 2 GM/50ML SOLUTION: Performed by: NURSE PRACTITIONER

## 2019-10-11 RX ORDER — MAGNESIUM SULFATE HEPTAHYDRATE 40 MG/ML
2 INJECTION, SOLUTION INTRAVENOUS ONCE
Status: COMPLETED | OUTPATIENT
Start: 2019-10-11 | End: 2019-10-11

## 2019-10-11 RX ORDER — SODIUM CHLORIDE 9 MG/ML
250 INJECTION, SOLUTION INTRAVENOUS ONCE
Status: COMPLETED | OUTPATIENT
Start: 2019-10-11 | End: 2019-10-11

## 2019-10-11 RX ORDER — SODIUM CHLORIDE 0.9 % (FLUSH) 0.9 %
10 SYRINGE (ML) INJECTION AS NEEDED
Status: CANCELLED | OUTPATIENT
Start: 2019-10-11

## 2019-10-11 RX ORDER — SODIUM CHLORIDE 0.9 % (FLUSH) 0.9 %
10 SYRINGE (ML) INJECTION AS NEEDED
Status: DISCONTINUED | OUTPATIENT
Start: 2019-10-11 | End: 2019-10-12 | Stop reason: HOSPADM

## 2019-10-11 RX ADMIN — HEPARIN 500 UNITS: 100 SYRINGE at 11:17

## 2019-10-11 RX ADMIN — MAGNESIUM SULFATE HEPTAHYDRATE 2 G: 40 INJECTION, SOLUTION INTRAVENOUS at 10:13

## 2019-10-11 RX ADMIN — SODIUM CHLORIDE 250 ML: 900 INJECTION, SOLUTION INTRAVENOUS at 10:13

## 2019-10-11 RX ADMIN — Medication 10 ML: at 11:17

## 2019-10-15 ENCOUNTER — DOCUMENTATION (OUTPATIENT)
Dept: ONCOLOGY | Facility: CLINIC | Age: 64
End: 2019-10-15

## 2019-10-15 NOTE — PROGRESS NOTES
I called the [facility] pharmacy department.  I left a voicemail explaining that we were requesting approval for Retacrit for chemotherapy-induced anemia.  Her disease is recurrent ovarian cancer which is a noncurable, not myeloid cancer.  I left information of the patient's name, date of birth, insurance ID, as well as PA number.  I left my cell phone number for them to call me back to discuss peer to peer review.  Electronically signed by IRENA Hoffman, 10/15/19, 1:51 PM.

## 2019-10-16 ENCOUNTER — DOCUMENTATION (OUTPATIENT)
Dept: ONCOLOGY | Facility: CLINIC | Age: 64
End: 2019-10-16

## 2019-10-16 NOTE — PROGRESS NOTES
I spoke with Ayah PINA  At [facility].  And confirmed that patient does have a noncurable, non-myeloid malignancy.  Electronically signed by IRENA Hoffman, 10/16/19, 9:39 AM.

## 2019-10-18 ENCOUNTER — HOSPITAL ENCOUNTER (OUTPATIENT)
Dept: ONCOLOGY | Facility: HOSPITAL | Age: 64
Setting detail: INFUSION SERIES
Discharge: HOME | End: 2019-10-18

## 2019-10-18 VITALS
BODY MASS INDEX: 30.66 KG/M2 | HEART RATE: 57 BPM | TEMPERATURE: 98.2 F | DIASTOLIC BLOOD PRESSURE: 84 MMHG | SYSTOLIC BLOOD PRESSURE: 130 MMHG | WEIGHT: 184 LBS | RESPIRATION RATE: 18 BRPM | HEIGHT: 65 IN

## 2019-10-18 DIAGNOSIS — C56.1 MALIGNANT NEOPLASM OF RIGHT OVARY (HCC): ICD-10-CM

## 2019-10-18 DIAGNOSIS — E83.42 HYPOMAGNESEMIA: Primary | ICD-10-CM

## 2019-10-18 LAB
BASOPHILS # BLD AUTO: 0.02 10*3/MM3 (ref 0–0.2)
BASOPHILS NFR BLD AUTO: 0.8 % (ref 0–1.5)
DEPRECATED RDW RBC AUTO: 67.5 FL (ref 37–54)
EOSINOPHIL # BLD AUTO: 0.06 10*3/MM3 (ref 0–0.4)
EOSINOPHIL NFR BLD AUTO: 2.3 % (ref 0.3–6.2)
ERYTHROCYTE [DISTWIDTH] IN BLOOD BY AUTOMATED COUNT: 17.5 % (ref 12.3–15.4)
HCT VFR BLD AUTO: 28.2 % (ref 34–46.6)
HGB BLD-MCNC: 9.1 G/DL (ref 12–15.9)
LYMPHOCYTES # BLD AUTO: 0.96 10*3/MM3 (ref 0.7–3.1)
LYMPHOCYTES NFR BLD AUTO: 37.4 % (ref 19.6–45.3)
MAGNESIUM SERPL-MCNC: 1.5 MG/DL (ref 1.6–2.4)
MCH RBC QN AUTO: 35 PG (ref 26.6–33)
MCHC RBC AUTO-ENTMCNC: 32.3 G/DL (ref 31.5–35.7)
MCV RBC AUTO: 108.5 FL (ref 79–97)
MONOCYTES # BLD AUTO: 0.51 10*3/MM3 (ref 0.1–0.9)
MONOCYTES NFR BLD AUTO: 19.8 % (ref 5–12)
NEUTROPHILS # BLD AUTO: 1.02 10*3/MM3 (ref 1.7–7)
NEUTROPHILS NFR BLD AUTO: 39.7 % (ref 42.7–76)
PLATELET # BLD AUTO: 70 10*3/MM3 (ref 140–450)
PMV BLD AUTO: 11.5 FL (ref 6–12)
RBC # BLD AUTO: 2.6 10*6/MM3 (ref 3.77–5.28)
WBC NRBC COR # BLD: 2.57 10*3/MM3 (ref 3.4–10.8)

## 2019-10-18 PROCEDURE — 83735 ASSAY OF MAGNESIUM: CPT | Performed by: NURSE PRACTITIONER

## 2019-10-18 PROCEDURE — 96365 THER/PROPH/DIAG IV INF INIT: CPT | Performed by: INTERNAL MEDICINE

## 2019-10-18 PROCEDURE — 25010000002 MAGNESIUM SULFATE 2 GM/50ML SOLUTION: Performed by: NURSE PRACTITIONER

## 2019-10-18 PROCEDURE — 85025 COMPLETE CBC W/AUTO DIFF WBC: CPT | Performed by: NURSE PRACTITIONER

## 2019-10-18 RX ORDER — SODIUM CHLORIDE 0.9 % (FLUSH) 0.9 %
10 SYRINGE (ML) INJECTION AS NEEDED
Status: CANCELLED | OUTPATIENT
Start: 2019-10-18

## 2019-10-18 RX ORDER — SODIUM CHLORIDE 0.9 % (FLUSH) 0.9 %
10 SYRINGE (ML) INJECTION AS NEEDED
Status: DISCONTINUED | OUTPATIENT
Start: 2019-10-18 | End: 2019-10-19 | Stop reason: HOSPADM

## 2019-10-18 RX ORDER — MAGNESIUM SULFATE HEPTAHYDRATE 40 MG/ML
2 INJECTION, SOLUTION INTRAVENOUS ONCE
Status: COMPLETED | OUTPATIENT
Start: 2019-10-18 | End: 2019-10-18

## 2019-10-18 RX ADMIN — Medication 10 ML: at 10:32

## 2019-10-18 RX ADMIN — MAGNESIUM SULFATE IN WATER 2 G: 40 INJECTION, SOLUTION INTRAVENOUS at 09:29

## 2019-10-18 RX ADMIN — Medication 500 UNITS: at 10:33

## 2019-10-24 NOTE — PROGRESS NOTES
Hematology/Oncology Outpatient Follow Up    PATIENT NAME:Tahira Zimmerman  :1955  MRN: [id number]  PRIMARY CARE PHYSICIAN: Noemy Queen MD  REFERRING PHYSICIAN: Noemy Queen MD    Chief Complaint   Patient presents with   • Follow-up     recurrent ovarian cancer, Deep vein thrombosis of left upper extremity, anemia and pernicious anemia.      HISTORY OF PRESENT ILLNESS:   1. Recurrent ovarian cancer diagnosis established in 2013.  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  The patient was treated with surgery and then postoperatively with adjuvant chemotherapy consisting of Carboplatin and Taxol.  Six months later, she had recurrence of disease intra-abdominally between the rectum and vagina, which was treated with intraabdominal peritoneal chemotherapy.  She had been free of disease since that time.  She reported feeling a mass in the left side of her abdomen approximately six months ago.  This gradually grew and in early 2013 she had her daughter feel the mass.  The daughter works for Dr. David Rogers and the patient at that time also complained of intermittent rectal bleeding.  Consultation with Dr. David Rogers was obtained.  The patient had a CT scan of the abdomen and pelvis performed on 13 revealing left lower quadrant mass measuring 9.7 x 9.3 x 6 cm demonstrating areas of dense calcification, soft tissue density and cystic density within it.  Stromal tumor is felt to be most likely a possibly fibroma.  It is generated with necrosis and calcification.  Possible palpable mass in the rectus muscle is seen with calcification and deformity of the rectus muscle and just below this a second mass intra-abdominally was seen measuring 2 cm in the greatest diameter suspicious for second intraabdominal tumor.  The mass separate from the largest mass measuring 2.6 cm in the dependent  portion of pelvis is seen on the right of the midline.  No retroperitoneal lymphadenopathy was seen.  No free air, free fluid or other abnormality was present.  The patient was scheduled for an outpatient colonoscopy, but had syncopal episode while sitting in the toilet the night before and was brought to the emergency room early in the morning by her daughter and son-in-law.  The patient had apparently stopped breathing for a few seconds and did not have a pulse, but started breathing before CPR could be initiated.  In the emergency room, the patient had an EKG revealing sinus rhythm nonspecific T-wave flattening; metabolic panel revealed a glucose of 148, creatinine of 1.3, CBC was normal.  She was treated with intravenous fluid.  The patient’s case was then discussed with Dr. Anabell Monique and patient was subsequently admitted to [facility].  The patient underwent a colonoscopy by Dr. David Rogers on 06/27/13 revealing sigmoid diverticulosis and grade II internal and external hemorrhoids, but no mass or colitis was seen.  CT-guided biopsy of the mass was performed on 06/27/13 as well revealing metastatic papillary adenocarcinoma with numerous psammoma bodies.  Pathology comment stated prior records were not available; however, with history of ovarian cancer the current biopsy would be consistent with metastases from that malignancy.  Oncology consult was obtained and I initially saw the patient on 06/27/2013 where the patient had claimed to have little bit of pain in the area of disease.  She reported being frequently constipated, denies any weight loss or fevers.  She did report having some night sweats, but thought that was because of her menopause.  On 06/27/13 metastatic workup including labs and CT scans were initiated.  CT scan of the chest on 06/27/13 revealed no acute disease in the chest.  A 1.4 cm size focal area of decreased density was noted in the lateral aspect of the right lobe of  the liver consistent with a benign cyst or hemangioma.  There was a very small hiatal hernia measuring 2.2 cm in diameter.  A CT scan of the head performed 06/27/13 revealed no acute intracranial abnormality noted.  CA-125 was 40 units/ml (0-35), CA 19-9 was 11.8 units/ml (0-35), CEA level was 0.8 ng/ml (0-3), TSH level was 1.09 IU/ml (0.34-5.6).  The patient was in workup for the syncopal episode, a carotid Doppler was performed on June 28 revealing an essentially normal study showing a reduction in diameter from 0-15% bilaterally.  A Holter monitor was completed on 06/27/13, which was unremarkable except for a few premature atrial contractions.  The patient was felt stable and subsequently was discharged home on 06/28/13.  Post discharge, the patient was seen at [facility] on 07/05/13 by Dr. Kathryn Thrasher who discussed treatment options with the patient including surgery.  The patient is in the office today 07/16/13 in follow up post hospitalization.  She is reporting that surgery is planned for July 30th of this month at .  · 7/30/13 to 8/3/13 - The patient was in [facility][1].  The patient was admitted for surgery for her recurrent ovarian cancer.  The patient had exploratory laparotomy, omentectomy, bowel resection, liver biopsy and appendectomy.  Pathology revealed of the omentum metastatic serous carcinoma of the transverse colon, segmental resection showed metastatic serous carcinoma involving mesenteric fat.  The liver wedge resection showed fibrosclerotic thickening of the hepatic capsule.  Benign liver parenchyma.  No evidence of metastatic tumor.  Appendix showed fibrous obliteration of the lumen.  Negative for metastatic carcinoma.  Rectum and sigmoid colon segmental resection showed metastatic serous carcinoma invading the colorectal mucosa uninvolved by tumor.  Vaginal mass showed metastatic serous carcinoma.  Margins are positive for tumor.  · 9/24/13  - Chemotherapy orders were written for patient to start carboplatin 550 mg IV day one and Taxol 330 mg IV day one to be cycled every three weeks.  · 10/10/13 - The patient started cycle #1 of chemotherapy consisting of Carboplatin and Taxol.  · 09/25/13 -  is 10.6 normal.  · 10/01/13 - CT of the chest, abdomen and pelvis revealed new reticular nodular occupancy in the anterior segment of the right upper lobe, could reflect an inflammation or infectious etiology or could reflect unusual persistence of metastatic tumor.  New liver lesions, the smaller one appears to be implant on the surface of the liver, the larger may also represent an implant including the intrahepatic.  Several small mesenteric nodes seen throughout the abdomen and pelvis.  · 10/02/13 - Port placed by Dr. Sen.  · 10/24/13 - Orders written to start Neupogen daily and if more than three Neupogen are needed to give Neulasta after next chemo.  ANC is 0.15.  · 10/31/14 - Patient given cycle 2 of chemotherapy with Taxol and Carboplatin.  · 11/21/13 - The patient started cycle 3 of chemotherapy consisting of Carboplatin and Taxol.  · 11/26/13 - CT scan of chest, abdomen and pelvis revealed no evidence of active disease in the chest.  Reticulonodular opacity has resolved.  Metastatic lesion impressing upon the hepatic dome similar to prior exam.  No evidence of a change.  No evidence of new disease.  Postsurgical changes in the pelvis and throughout the retroperitoneum in the large bowel.  Finding containing anterior abdominal wall hernia containing fat tissue and enhancing vessels, 3 cm in diameter.  · 12/2/13 - Orders written to start Neupogen per protocol as well as Aranesp due to low hemoglobin and ANC.  ANC is 0.14.  Hemoglobin is 9.7.  · 12/11/13 - Orders written to hold chemotherapy until next week and decrease dose by 20% due to low platelet count.  Platelet count is 85,000.  · 12/16/13 - The patient received cycle 4 of Carboplatin and Taxol  at a 20% dose reduction so Taxol dose is 260 mg and Carboplatin is 440 mg.  · 12/16/13 - CA-125 12.6 (N).  · 12/19/13 - PET/CT scan.  Impression:  1. There is no evidence of hypermetabolism in the liver.  Specifically of the dominant lesion in or adjacent to the right hepatic dome that was seen and described on the recent CT study of 11/26/2013.  It is photopenic relative to the surrounding liver.  2.  There is no evidence of hypermetabolic soft issue mass lesion or free fluid in the abdomen or pelvis.  3.  The tonsillar tissues are slightly enlarged and have moderate activity level.  This maybe physiologic.  Correlation with oral cavity, examination is recommended.  4.  Mild amount of activity in the right level II jugular lymph chain without focally enlarged lymph nodes is of questionable significance.  · 1/6/13 - The patient received cycle 5 of chemotherapy with Taxol and Carboplatin.  · 1/27/14 - The patient started on cycle 6 of Taxol and Carboplatin.  · 2/18/14 - CT of the abdomen and pelvis with contrast; stable lesions impressing upon the hepatic dome, unchanged compared to the prior exam.  Multiple anterior abdominal wall hernias re-demonstrated.  Those above the umbilicus contain omental fat.  Below and to the left of the umbilicus as anterior abdominal hernia containing omental fat in nondilated small bowel which is larger than seen previously.  · 2/20/14 - The patient received cycle 7 of chemotherapy with Taxol and Carboplatin.   · 3/11/14 - Order written to discontinue chemotherapy due to patient has completed 7 cycles of chemotherapy and CT scans suggest possible remission.  · 3/11/14 -  11.0 (N).  · 06/05/14 - CT scan of the chest abdomen and pelvis with contrast: There are multiple anterior abdominal wall hernias.  There are 2 larger anterior abdominal wall hernias at the level of the transverse colon, which contain omental fat.  There are at least 2 small anterior abdominal wall hernias that  contain omental fat.  There is a moderate sized anterior abdominal wall hernia at the level of the umbilicus, eccentric to the left, which contain non-dilated bowel.  Interval decrease in the size of two deposit impressing on the liver.  The third tiny deposit has been stable.  · 8/19/14 -  10.4 (N).  · 12/19/14 -   10.0 (N)  · 1/7/15 - CT chest, abdomen and pelvis with stable appearance of the chest with several micronodules. Small hiatal hernia, slightly larger than previous exam, increased from 1.5 to 2.5 cm in diameter. Bochdalek hernia unchanged. Multiple hepatic lesions. The two dominant lesions at the right hepatic dome had decreased in size from previous. The remaining tiny nodules measured 3-5 mm in diameter and were unchanged. There was no evidence of new intra-abdominal or pelvic mass or free fluid. There were multiple anterior abdominal wall hernias which had increased in size.   · 7/27/15 - Comprehensive metabolic panel with no significant abnormalities. CA-125 of 21 (0-35).   · 10/26/15 - WBC 6, hemoglobin 13, platelet count 204,000. Heart rate 47. CA-125 of 20 (0-35).    · 2/18/16 - CT chest with contrast with stable micronodular density seen in the lungs without significant change from prior exam. CT abdomen and pelvis with regression of liver lesions with no new evidence of metastasis. Multiple ventral hernias again noted without significant change from prior exam. Creatinine 0.9 (0.6-1.3).    · 2/25/16 - Patient hospitalized at [facility] between 2/25/16 and 2/27/16 with pyelonephritis secondary to E-coli. She was given two days of IV Rocephin and then placed on oral Levaquin. She was asked to hold her Coumadin, but then had nausea and vomiting because of Levaquin and stopped the Levaquin also. Her CA-125 was 32 (0-35) and CEA 1.3 (0-5). Her urine grew >100,000 E-coli. CT of the abdomen and pelvis was done which revealed 4.1 x 1.8 cm sized mild ovoid area of decreased  density in the liver parenchyma along the anteromedial aspect of the dome of the right lobe of the liver, unchanged significantly since the previous study of 2/18/16. There was suspicion of a very small pericardial effusion. There was suspicion of a small hiatal hernia. There were several hernias in the anterior abdominal wall. A 3.5 x 3.1 cm incised well-circumscribed abnormal density in the left retroperitoneal fatty soft tissue at the level of the left iliac crest was suggestive of tumor recurrence. There was an abnormal density in the left retroperitoneal soft tissues in the left flank that partially surrounded the left kidney and extended downward to the left pelvic area. Diverticulosis of the distal descending colon and sigmoid colon were seen.     · 3/8/16 - Patient prescribed Bactrim DS 1 p.o. b.i.d. for 10 days for pyelonephritis, which was partially treated with Rocephin and Levaquin. Urine with 40,000 E-coli treated with Macrobid for 7 days.  of 20 (0-35).    · 3/31/16 - PET scan with area of low density anteriorly in the right lobe of the liver with no significant radiopharmaceutical uptake to suggest malignancy. Multiple round soft tissue density masses showing increased radiopharmaceutical activity and multiple anterior abdominal wall hernias.   · 5/10/16 - Patient complains of venous enlargement of the left chest wall around the port. Has not been seen by  yet, but has an appointment on 5/23/16. Complained of a cough.   · 5/12/16 - Infusaport contrast study with no evidence of fibrin sheath. Chest x-ray with mild cardiac prominence.   · 5/23/16 - Patient seen in consultation by Sheng Bowman M.D. at [facility] and a chemotherapy trial recommended.   · 6/1/16 -   of 27.1 (0-35).    · 6/14/16 - WBC 5.71, hemoglobin 12.4, platelet count 188,000. Patient is scheduled for surgery at  on 6/16/16 after further discussion with  physicians. Discussed adjuvant chemotherapy.    · 6/16/16 - Excisional biopsy of abdominal wall mass performed by Sheng Bowman M.D. at [facility] with pathology revealing metastatic high-grade papillary serous carcinoma.   · 7/7/16 - Received a call from the patient that Tramadol was not working and that she was given Norco #24 after her biopsy at  which did help her pain. Patient prescribed Norco 5/325 one p.o. q. 4-6 hours p.r.n. #60 with no refills. Echocardiogram with left ventricular inferior wall hypokinesis with ejection fraction of 50-55%.   · 7/8/16 - Patient seen in consultation by Sheng Bowman M.D. in consultation at  for metastatic high-grade papillary serous carcinoma diagnosed by excisional biopsy on 6/16/16 with carcinomatosis and patient not a surgical resection candidate. Genetic sequencing and susceptibility testing of tumor was ordered. Biopsy was done of anterior abdominal wall.   · 7/7/16 - Echocardiogram with left atrial enlargement. Mild mitral regurgitation. Left ventricular proximal inferior wall hypokinesis with ejection fraction of 50-55%.   · 7/11/16 - While waiting for genomics results, in order not to delay treatment further, order written for Taxotere 60 mg/M2 IV over one hour followed by Carboplatin AUC 5 over one hour, cycles to be repeated every three weeks.  Comprehensive metabolic panel normal.  26 (0-35).    · 725/16 - Pain contract signed for use of Norco.   · 8/5/16 - Patient stated that she experienced a red rash and was itchy post taking Norco. Norco discontinued and Tramadol refilled.   · 8/16/16 - Patient started cycle 1 chemotherapy. WBC 7.1, hemoglobin 11.2, MCV 88.7, platelet count 94,000. Patient complained of nausea for a week post chemo. Already getting Emend, Aloxi and Decadron. Added Omeprazole 40 mg daily orally.   · 8/17/16 - Urine culture with >100,000 E-coli.   · 8/25/16 - Cycle 2 chemotherapy given.   · 9/9/16 - Magnesium 1.3, replaced intravenously. Potassium 3.4 (3.6-5.1),  increased oral potassium supplementation.    · 9/16/16 - Cycle 3 chemotherapy given.   · 9/19/16 - Comprehensive metabolic panel with no significant abnormality.   · 9/20/16 - CT abdomen and pelvis with evidence of progressive metastatic disease in the abdomen and pelvis with interval enlargement of several soft tissue attenuations and subcutaneous nodules in the anterior abdominal wall. Interval enlargement of a left pelvic soft tissue attenuation mass. A lentiform area of low attenuation in the dome of the liver stable in size. Multiple anterior abdominal wall hernias containing fat and/or bowel. Marked pelvic floor laxity. CT chest negative for evidence of metastatic disease. Tiny pulmonary nodules in the right lung stable since 2013 consistent with a benign etiology.   · 9/23/16 - Macrobid 100 mg p.o. b.i.d. x7 days prescribed for UTI. Current chemotherapy discontinued after discussion and new chemotherapy orders written. Carboplatin AUC-5 IV day 1 and Doxil (Liposomal Doxorubicin) 30 mg/M2 IV day 1 to cycle q. 21 days.  of 18 (0-35). Magnesium 1.6, replaced intravenously. Comprehensive metabolic panel with potassium 3.4 (3.6-5.1).     · 10/13/16 - Patient started on cycle 1 of Carboplatin and Liposomal Doxorubicin followed by Neulasta.   · 11/3/16 - Cycle 2 Carbo and Doxil given.   · 11/17/16 - Magnesium 1.0, replaced intravenously. Oral potassium supplement increased.   · 11/29/16 - CT chest with small non-calcified right pulmonary nodules unchanged. Benign bone island in the midthoracic vertebral body along with benign bony hemangiomas in the middle thoracic vertebral bodies. CT abdomen and pelvis with mild progressive metastatic disease from CT of September 2016. Anterior abdominal wall mass superiorly mildly increased in size with new area noted as described measuring 3 x 1.7 cm. 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 Stable area of decreased uptake in  the dome of the liver not hypermetabolic on recent PET scan, likely representing cyst or focal fatty infiltration. Stable small hiatal hernia, fat-containing Bochdalek’s hernia and anterior abdominal wall hernias with stable pelvic floor relaxation.    · 12/1/16 - Cycle 3 chemotherapy given.   · 12/8/16 - Current chemotherapy discontinued and patient started on Gemcitabine 1000 mg/M2 IV days 1, 8, 15 q. 28 days.   · 12/22/16 - Patient seen in followup by Juliana Roy M.D. at the pain clinic, treated with Oxycodone and Lyrica. Transaminases normal. Patient started cycle 1 chemotherapy.   · 12/29/16 - Magnesium 1.1 (1.8-2.5), replaced intravenously.   · 1/5/17 - Cycle 1 day 15 chemotherapy held as patient leaving for vacation to [place]. Chemotherapy dose decreased by 20%. Patient complains of diarrhea for which Imodium prescribed.   · 1/11/17 - BRCA-1 and BRCA-2 negative.   · 1/12/17 - Echocardiogram with ejection fraction 60% or greater.   · 1/19/17 - Comprehensive metabolic panel with no significant abnormality. Patient started cycle 2 chemotherapy.   · 2/2/17 - Urine with >100,000 E-coli treated with Cipro 500 mg p.o. b.i.d. x1 week.   · 2/6/17 - Magnesium 1.1 (1.8-2.5), replaced intravenously.    · 2/23/17 - Patient started cycle 3 chemotherapy.   · 2/27/17 - CT chest with interval development of too numerous to count very small pulmonary nodules, ill-defined ground glass opacities concerning for development of pulmonary metastatic disease. Stable left-sided chest port. CT abdomen and pelvis with stable appearance of multiple small soft tissue densities within the abdomen near midline subcutaneous fat of the abdominal wall. Interval decrease in size of largely calcified left pelvic mass and multiple fat in bowel containing small ventral hernias.   · 3/6/17 - Pulmonary consultation obtained for lung nodules. Discussed CT results with patient. Decision made to continue present chemotherapy until further  lung evaluation as abdominal disease better.  of 18 (0-35).    · 3/16/17 - Patient started cycle 4 chemotherapy, but treatment held from day 8 onwards because of hospitalization.   · 3/19/17 - Patient was hospitalized at [facility] between 3/19/17 and 3/25/17 with chest pain. Chest x-ray had revealed increased mild bibasilar airspace disease. Troponin I and EKG’s were negative. INR was elevated. Patient was treated with antibiotics. EGD was performed revealing small hiatal hernia and esophageal dilatation was performed. Bronchoscopy was performed also with no intrabronchial lesions identified. IgA was 51 (), IgG 320 (600-1500) and IgM 19 (). Lexiscan nuclear stress test had no evidence of reversible myocardial ischemia with normal left ventricular ejection fraction of 58%. CT chest had development of patchy bilateral ground glass opacities most pronounced involving the left upper lobe and bilateral lower lobes. Multiple tiny bilateral pulmonary nodules were less conspicuous. The patient underwent a bronchoscopy by Jerica Esparza M.D. with right upper lobe bronchoalveolar lavage revealing benign squamous cells and bronchial cells with pulmonary macrophages present. There was mild acute inflammation. Negative for malignant cells. Prilosec was changed to Famotidine for hypomagnesemia.    · 4/6/17 - Magnesium 1.2 (1.8-2.5). Comprehensive metabolic panel with no significant abnormality. Patient seen in follow-up by Fito Ram M.D. at [facility]. Treated with Lyrica and Oxycodone.    · 5/4/17 - Patient complains of a rash itching on her right upper arm. Eczematous rash noted and patient prescribed Cyclocort 0.1% b.i.d. Magnesium infusions continued with each chemo. Order written to resume chemotherapy.   · 5/16/17 - Cycle 5 chemotherapy started.   · 5/26/17 - Magnesium 1.4 (1.8-2.5), replaced intravenously. Potassium 2.9 (3.6-5.1), KCL increased to 20 mEq three in the morning and three in the  evenings.   · 5/30/17 - PET scan with remaining metabolic activity within the nodular areas. The suggested mass which is partially calcified and necrotic within the left aspect of the pelvis seems slightly larger with increased metabolic activity. There was more calcification in these areas compared to prior study, suggesting inflammation.      · 6/8/17 - Patient complaining of shortness of breath. Does take HCTZ at home. Chest x-ray ordered and chemotherapy continued along with weekly magnesium replacement. Chest x-ray with no acute cardiopulmonary abnormalities.   · 6/13/17 - Patient received cycle 6 of chemotherapy.   · 7/31/17 - WBC 5.0, hemoglobin 11.2, MCV 89.7, platelet count 217,000. Patient is to resume chemotherapy starting with cycle 7 on Wednesday of this week.  of 19 (<35). Potassium 3.3 (3.5-5.3).     · 8/2/17 - Patient began cycle 7 of chemotherapy.   · 8/30/17 - Patient started cycle 8 chemotherapy.   · 9/5/17 - Patient seen in followup at Pain Management by Fito Ram M.D. and continued on treatment with Oxycodone and Lyrica.   · 9/13/17 - Patient was hospitalized at [facility] for a day with dizziness secondary to dehydration, hypokalemia and anemia. She was given 1 unit of packed red blood cells and electrolytes were replaced. Chest x-ray revealed a subtle opacity in the left lateral lung base favored to represent atelectasis. Magnesium 1.3 (1.5-2.5), patient continued on replacement.    · 9/22/17 - Chemotherapy and magnesium replacement continued with decrease in chemotherapy dose by 10% secondary to cytopenias.   · 9/25/17 - Cycle 9 chemotherapy started.   · 10/12/17 - CT of the chest with contrast showed several tiny pulmonary nodules. One within the right lower lobe appears new from prior exams. Two adjacent foci of nodularity within the medial right lower lobe suggestive of minor infectious or inflammatory process. CT of the abdomen and pelvis with contrast which showed soft tissue  nodules along the anterior abdominal and pelvic walls unchanged from previous scan. Also a partially calcified mass within the left pelvis unchanged. No acute process identified within the abdomen or pelvis. Several left paracentral ventral hernias unchanged. No evidence of acute bowel obstruction.   · 10/16/17 - Order written for Levaquin 500 mg p.o. daily as the patient states that she has had a productive cough, fever and chills and with the results of the CT scan showing a possible infectious versus inflammatory process. Order also written to continue chemotherapy and magnesium replacement as previously prescribed.   · 10/23/17 - Cycle 10 chemotherapy started.   · 11/19/17 - Patient went to the Emergency Room at [facility] complaining of right lower extremity redness and fever for a day. Venous Doppler had no evidence of a DVT and patient was discharged home on Clindamycin.   · 11/21/17 -  of 17 (0-35).   · 12/4/17 - Cycle 11 chemotherapy started.   · 12/20/17 - PET scan with multiple hypermetabolic soft tissue nodules abutting the anterior abdominal wall, stable from previous examination. Peripherally calcified left lower quadrant mass near the ascending colon stable in size demonstrating no hypermetabolic uptake. Previously had maximum SUV of 7. Right medial basilar pulmonary nodules resolved.   · 12/22/17 - CT left lower extremity with soft tissue swelling with skin thickening present along the anterior and medial aspect of the leg, slightly more pronounced around the palpable marker seen within the upper medial aspect of the lower extremity.   · 12/26/17 - Elastic stockings prescribed for lower extremity edema.   · 1/8/18 - Patient hospitalized at [facility] between 1/8/18 and 1/11/18 with fever of up to 101 degrees and painful rash on the lower extremities of several weeks’ duration. She was found to be hypokalemic and MRI of the lower extremities revealed thickening and swelling. Chest x-ray had no acute  cardiopulmonary abnormality. Skin biopsy was performed by Surgery revealing stasis dermatitis and no evidence of malignancy. Infectious Disease consultation was obtained and IV antibiotics were stopped. Blood cultures had no growth.   · 1/22/18 - Patient asked to start wearing elastic stockings which she has not started yet. She was given a prescription for Dyazide 1 p.o. daily.   · 2/26/18 - Ordered chemo to resume again. Patient unaware that she was supposed to resume chemo after her last visit in January. Continue magnesium infusions on day 1, 8 and 15. Prescribed Levaquin 500 mg p.o. daily x10 days for productive cough x1 week. History of viral illness consistent with influenza.  of 18 (0-35). Chest x-ray with no acute process.    · 3/5/18 - Cycle 12 chemotherapy started.   · 3/26/18 - Options discussed with patient. Decision made to discontinue IV Gemzar and orders written to start on Niraparib (Zejula) 300 mg p.o. daily as maintenance therapy.   · 4/11/18 - Niraparib discontinued due to insurance denial. Orders written to start Carboplatin-AUC 5 IV day 1 cycling every 21 days. Orders written for Neulasta 6 mg subcutaneous kit day 1 with palliative treatment intent and expected duration of treatment three months.   · 4/12/18 - CT chest, abdomen and pelvis with contrast revealed numerous tiny centrilobular nodules in the lungs favored to reflect infectious or inflammatory process. Nodules ranged 1-3 mm in size and are new from prior studies with metastatic disease not entirely excluded. Stable small hiatal hernia. Interval progression of metastatic disease involving the subcutaneous tissues at the ventral abdominal wall. Multiple soft tissue density nodules previously shown to be hypermetabolic on PET scan. New small crescent of low attenuation material along the periphery of the spleen with similar finding at the dome of the liver. Findings are concerning for peritoneal metastases. Density of the dome of  the liver remained unchanged from multiple prior studies. Stable calcified mass in the left pelvic sidewall. Urinary bladder wall thickening.   · 4/23/18 - Cycle 1 chemotherapy with Carboplatin administered along with Neulasta injection.   · 4/26/18 - Neulasta discontinued due to insurance denial.   · 5/14/18 - Patient received cycle 2 Carboplatin.   · 6/5/18 - Cycle 3 day 1 chemotherapy with Carboplatin administered.   · 6/20/18 - CT chest, abdomen and pelvis at [facility] showed re-demonstration of soft tissue mass in the ventral wall hernia left of the midline as well as the dome of the liver, likely representing metastatic disease similar as compared to the prior study. Additional calcified lesions present in the upper left hemipelvis and along the anterior abdominal wall to the right of the midline also likely representing metastatic disease. No definite new lesions were identified. Moderate to large stool burden likely related to mild constipation was present. No suspicious lung nodules were identified. The previously-described ground glass nodules appeared to have resolved. There was a stable subpleural nodule in the right upper lobe measuring 3 mm present since 2014 without significant changes.   · 6/26/18 - Cycle 4 day 1 Carboplatin administered with addition of Neulasta for febrile neutropenia prophylaxis.   · 6/29/18 - Reviewed CT scans with patient. Orders written to discontinue Carboplatin and Neulasta and plan to start Niraparib 300 mg by mouth daily as maintenance therapy. Prescribed Amlodipine 2.5 mg p.o. daily for chemo-induced hypertension.   · 7/18/18 - Orders written to increase weekly Magnesium Sulfate to 3 g IV weekly due to continued hypomagnesemia.   · 8/1/18 - Patient reports she has not received Niraparib (Zejula) to date.   · 8/30/18 - Patient’s phone was not working so though Niraparib approved, the drug company had not been able to get ahold of her. Patient supplied with drug  company number so that she can start taking the pills.   · 9/6/18 -  of 20 (N).   · 9/18/18 - Urinalysis done for dysuria and back pain. Urine culture grew 20,000 to 50,000 colonies of urogenital ruy. Prescribed Bactrim DS one tablet twice daily for one week.   · September 2018 - Patient initiated on Zejula 300 mg p.o. daily.   · 10/1/18 - WBC 3.5, hemoglobin 12, platelet count 74,000. Niraparib (Zejula) dose held and then resumed when platelets above 100,000 at a dose of 200 mg daily.   · 10/15/18 - Patient received Niraparib (Zejula) at 200 mg p.o. daily with a platelet count of 198,000.   · 11/7/18 - WBC 6, hemoglobin 11.6, MCV 96.2, platelet count 137,000. Patient claims to have stopped taking Niraparib a few days prior because of cough. Asked to resume taking it. Ciprofloxacin 500 mg p.o. twice daily for one week for bronchitis.   · 12/3/18 - Magnesium Sulfate infusions changed to every two weeks, to send mag level before and give 3 grams. DC’d Magnesium Oxide and started Magnesium Plus Protein two pills twice daily.   · 1/4/19 - CT chest, abdomen and pelvis with new patchy nodular areas of airspace consolidation within the bilateral upper lobes, left greater than right, favored to be infectious or inflammatory. Interval enlargement of the peritoneal soft tissue masses within the pelvis compatible with progression of disease. Enlarging ventral hernia now containing multiple loops of small bowel and colon.   · 1/7/19 - Patient has not been coming in for weekly magnesium replacement as nursing has not been able to get ahold of her. Results of CT’s discussed with patient. Decision made to change her to IV chemotherapy with Carboplatin AUC-5 day 1 and pegylated liposomal Doxorubicin (Doxil) 30 mg/M2 IV day 1 to cycle every four weeks. Patient made aware of the limited choices of her treatment available.   · 1/31/19 - Echocardiogram showed LVEF 50-55% and otherwise normal echo Doppler study.   · 2/4/19 - New  chemotherapy not initiated to date with difficulty contacting patient for echocardiogram. Neulasta On-Pro 6 mg ordered with chemotherapy due to extensive prior exposure to chemotherapy, increasing risk of febrile neutropenia, history of neutropenic events on prior chemotherapy regimens.   · 2/15/19 - Patient started cycle 1 chemotherapy with Neulasta support.   · 3/6/19 -  of 28 (0-35).   · 3/15/19 - Cycle 2 Carboplatin with Liposomal Doxorubicin (Doxil) and Neulasta support initiated.     · 4/10/19 - Patient was requested to followup with Dr. Queen regarding hypotension and blood pressure medication dosing. Patient appears to be tolerating treatment well with cytopenias not requiring dose adjustments. No Doxil-related skin or mucus membrane toxicities or other significant toxicities to date.   · 4/12/19 - Cycle 3 chemotherapy given.   · 4/16/19 - CT chest, abdomen and pelvis with no evidence of active metastasis to the chest. Several tree-in-bud nodules within the periphery of the inferior right upper lobe likely infectious or inflammatory. Disease burden within abdomen and pelvis stable to slightly increased from prior CT. Specifically, a soft tissue mass along the right rectus muscle slightly increased in bulk. Multiple ventral hernias, some containing loops of bowel, with no evidence of acute bowel obstruction.   · 5/8/19 - Discussed CT results with patient. Overall stable disease and would like to continue same treatment. Dove soap and Hydrocortisone Cream p.r.n. prescribed for scattered rash on back.   · 5/10/19 - Cycle 4 Carboplatin and Liposomal Doxorubicin initiated.   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 It was ER positive, HER2/zuleima negative, PD-L1 negative. There was TOPO1 positivity and TUBB3 positivity. TMB was low (two mutations per megabyte MUTS/MB) and MSI was stable. There were 13 therapies considered with increased  benefit. The 13 therapies with increased benefit included Fulvestrant, Irinotecan, Letrozole, Topotecan, Anastrazole, Exemestane, Tamoxifen, all of which were referenced to [facility] as well as Abemaciclib, Everolimus, Gefitinib, Palbociclib, Ribociclib and Toremifene.     · 6/5/19 echocardiogram with preserved LV systolic function with EF around 60%.  · 6/7/19 cycle 5 chemotherapy with Neulasta support given.  Patient continued on mag oxide 400 mg p.o. twice daily and weekly IV 3 g mag sulfate infusions for persistent hypomagnesemia.  · 7/5/2019- cycle 6 chemotherapy with carboplatin and doxorubicin with Neulasta port initiated.  Ca125:  21 (0-35).  · 7/23/2019-CT chest abdomen and pelvis compared to CT from 4/16/2019 revealed multiple small pulmonary nodules unchanged from prior examination within the right upper and left lower lobes.  Complex ventral hernia similar to prior exam.  Soft tissue nodule along the right lateral margin of the right of the midline measuring 12 x 10 mm unchanged in size.  Irregular soft tissue nodular thickening along the margin of the most inferior aspect of the complex ventral hernia with thickness measuring up to 13 mm similar to prior examination.  Extensive calcified and soft tissue mass within the left lower quadrant decrease in size now measuring 2.6 x 2.3 cm previously 3.0 x 2.5 cm.  Partially calcified mass involving the right rectus sheath measuring 3.1 x 2.7 cm previously measured 3.3 x 2.9 cm.  Densely calcified mass measuring 2.1 x 1.6 cm unchanged previously measured 2 x 1.7 cm.  Right external iliac chain lymph node measuring 9 mm previously measured 5 mm.  · 8/2/2019 cycle 7 chemotherapy given.  · 8/30/2019-cycle 8 chemotherapy with carboplatin and doxorubicin with Neulasta support given.  · 9/27/2019 cycle 9 chemotherapy given.  · 10/1/19 - Echocardiogram showed Normal LV size and contractility EF of 60-65%. Normal RV size. Borderline left atrial enlargement. Aortic valve,  mitral valve, tricuspid valve appears structurally normal, no significant regurgitation seen.No pericardial effusion seen. Proximal aorta appears normal in size.  · 10/18/19 - Magnesium 1.5 (1.8-2.5).  IV magnesium replacement continued.  · 10/25/2019 cycle 10 chemotherapy given.    2. Deep vein thrombosis of left upper extremity diagnosis established in October of 2013.  · 10/16/13 - Venous Doppler of left upper extremity.  Impression:  Deep vein thrombosis involving the subclavian, axillary and brachial veins on the left side, superficial vein thrombosis involving the left basilic vein.  · 10/16/13 - Order written for Lovenox 120 mg subcutaneously daily until INR is in therapeutic range.  Order written for Coumadin 5 mg p.o. daily.  The patient is to follow PT and INR protocol.  · 5/20/16 - Venous Doppler bilateral lower extremities normal.  · 7/30/19 - Patient continued on Coumadin following the INR protocol.     3. Anemia diagnosis established in November 2013 and pernicious anemia diagnosis established March 2016.   · 11/15/13 - Hemoglobin is 7.8.  · 12/2/13 - Orders written to start Aranesp 200 mg subcutaneous every two weeks due to low hemoglobin secondary to chemo induced anemia.  Hemoglobin is 9.7.  Anemia workup is ordered.  · 12/03/13 - Ferritin 179.8 (N), folic acid 8.3 (N), vitamin B12 314, iron 104 (N), TIBC 298 (N), iron saturation 35 (N), Reticulocyte count 0.88 (N).  EPO level 124 (N).  Haptoglobin 22 (H).  · 10/22/14 - Hemoglobin 12.5, hematocrit 37.3, MCV 91.6.  · 10/23/14 - Anemia resolved.   · 3/8/16 - WBC 5.8, hemoglobin 11.7, MCV 90.3, platelet count 245,000. Vitamin B12 of 189 (180-914), ferritin 61 (), iron 91 (), TIBC 345 (228-428).   · 3/15/16 -  ().        · 3/22/16 - Intrinsic factor antibody positive, antiparietal antibody negative. Vitamin B12 at 1000 mcg IM weekly started, but the patient after receiving first dose on 3/22/16 did not come back for injections  until 4/13/16.   · 4/13/16 - WBC 5.1, hemoglobin 12.5, MCV 87.9, platelet count 201,000. Patient given B12 injection and then continued at home.   · 5/10/16 - WBC 5.1, hemoglobin 12.5, platelet count 201,000.   · 6/14/16 - Monthly Vitamin B12 injections continued at home.   · 7/11/16 - WBC 5.48, hemoglobin 12.7, MCV 86.2, platelet count 200,000.   · 8/16/16 - Patient continued on monthly Vitamin B12 injections at home.   · 1/5/17 - WBC 2.6 with 38% neutrophils, 56% lymphocytes, 5% monocytes, hemoglobin 10.5, .3, platelet count 63,000. Patient continued on Vitamin B12 injections 1000 mcg IM monthly at home.   · 3/20/17 - Retic 0.41 (0.5-1.5), creatinine 1.0 (0.4-1.0). Iron 12 (), TIBC 270 (228-428), ferritin 259 (), haptoglobin 340 (), TSH 0.43 (0.34-5.6), folate 6.0 (5.9-24.8). Serum protein electrophoresis revealed decreased gammaglobulins. Serum EDU had no monoclonal gammopathy identified. Stool Hemoccult was negative.   · 6/8/17 - WBC 6.3, hemoglobin 8.3, MCV 92.4, platelet count 50,000. Procrit 40,000 units subq weekly added for chemotherapy-induced anemia. Patient continued on Vitamin B12 at 1000 mcg IM monthly at home.   · 7/5/17 - Iron 33 (), TIBC 328 (228-428), ferritin 211 (), TSH 2.46 (0.34-5.6).       · 7/31/17 - WBC 5.0, hemoglobin 11.2, MCV 89.7, platelet count 217,000. We will continue with the Procrit 40,000 units subq weekly for chemo-induced anemia when the hemoglobin falls below 10.0 and the patient is also to continue with the Vitamin B12 at 1000 mcg IM monthly at home. Creatinine 1.24 (0.5-0.99).    · 8/28/17 - WBC 9.6, hemoglobin 8.7, MCV 93.7, platelet count 133,000. Patient is to continue with the Procrit 40,000 units subq weekly and will receive that today. She is also to continue with the Vitamin B12 at 1000 mcg IM monthly at home.  · 10/16/17 - WBC 7.5, hemoglobin 9.6, MCV 98.4, platelet count 195,000. Patient is to continue with Procrit 40,000 units  subq weekly and will receive Procrit today.   · 1/1/18 - Creatinine 1.3 (0.4-1.0).    · 2/19/18 - Procrit given for hemoglobin 9.9.   · 2/26/18 - Continue Procrit 40,000 units weekly p.r.n. hemoglobin <10. Continue Vitamin B12 at 1000 mcg IM monthly at home.   · 3/26/18 - Hemoglobin 8.3. Procrit dose increased to 60,000 units weekly. Iron 29 (), TIBC 306 (228-428), and iron sat 9% (15-50). Iron 29 (), TIBC 306 (228-428), iron saturation 9% (15-50).   · 3/27/18 - Order written to start Ferrous sulfate 325 mg p.o. b.i.d.    · 4/2/18 - Stool heme negative x3.   · 4/30/18 - Patient had not started Ferrous sulfate 325 mg p.o. b.i.d. and verbalized understanding to do so and to notify the office if side effects are not tolerable.   · 5/24/18 - Patient was hospitalized at [facility] between 5/24/18 and 5/26/18 with dark tarry stool. INR was subtherapeutic. She was given IV Protonix and Protonix 40 mg daily. She underwent an EGD by David Rogers M.D. revealing small hiatal hernia, small submucosal nodule near the cardia, erosive gastritis. Pathology on gastric antrum and body biopsy revealed iron pill gastritis and no evidence of Helicobacter pylori. She then underwent a colonoscopy revealing diverticulosis, hemorrhoids and surgical changes from previous resection. It was recommended to consider outpatient M2A if hemoglobin continued to drop.   · 6/4/18 - Order written to discontinue iron pills and to use Injectafer 750 mg IV days 1 and 8 for low iron sats. Order written for Procrit 40,000 units subq weekly. Iron 65 (), TIBC 328 (228-428), iron saturation 20% (15-50), ferritin 123 ().   · 6/29/18 - Discussed patient’s intolerance of oral iron due to gastritis. Oral iron discontinued with plans for Injectafer should iron level drop in the future.   · 11/7/18 - Patient claims not to be taking Vitamin B12 injections at home. Asked to resume those.   · 12/3/18 - Patient reports she has not been taking  her B12 injections for a couple of months. She needs some syringes and needles for that.   · 2/4/19 - Patient is uncertain if she is currently taking Ferrous Sulfate or not. Patient with history of intolerance and will follow hemoglobin.   · 5/8/19 - Ferritin 64 ().  · 8/27/2019- iron 52 (), iron saturation 14% (15-50), TIBC 364 (228-420), and ferritin 74 ().  Injectafer 750 mg IV day 1 and 8 due to oral iron intolerance ordered.  · 8/30/2019- Injectafer 750 mg IV day 1 given.  Hemoglobin 10.8.  At the end of the infusion heart rate was 48 and the patient reported mild dizziness.  Patient was sent to the ED for evaluation with symptoms resolving rapidly.  Chest x-ray and CT head were negative for acute findings.  · 9/6/2019-Injectafer 750 mg IV day 8 given without adverse effects.  Hemoglobin 9.8.   · 9/25/19 - Iron 85 (). Iron saturation 25 (15-50). TIBC 335 (228-428). Ferritin  694 ().  Hemoglobin 10.3.  · 10/25/2019 hemoglobin 9.7.  Patient started on Retacrit 40,000 units subcu weekly.     Past Medical History:   Diagnosis Date   • Anemia 2013   • Cervical disc disorder 2013    Herniated disc, pinched nerve   • Clotting disorder (CMS/HCC) 2013    Low platelets from chemo   • Colon polyp 2013   • Deep vein thrombosis (CMS/HCC) 2013   • Diverticulosis 2013   • GERD (gastroesophageal reflux disease) 2016   • HL (hearing loss) 2016    I need hearing aids   • Hyperlipidemia 2013    Need my cholesterol rechecked   • Hypertension    • Joint pain 2013    Shoulder pain, torn rotator cuff   • Low back pain 2013   • Neuromuscular disorder (CMS/HCC) 2015    Shingles, pinched nerves in my neck   • Osteopenia 2014   • Osteoporosis    • Ovarian cancer (CMS/HCC)    • Pneumonia 2010    Have had it several times   • Spinal stenosis 2013    Cervical   • Urinary tract infection 2013    Have had several utis       Past Surgical History:   Procedure Laterality Date   • COLON SURGERY     • COLONOSCOPY    "  • EXPLORATORY LAPAROTOMY     • HERNIA REPAIR     • HYSTERECTOMY     • OOPHORECTOMY           Current Outpatient Medications:   •  Acetaminophen 500 MG coapsule, Take 100 mg by mouth 4 (Four) Times a Day. As needed, Disp: , Rfl:   •  atorvastatin (LIPITOR) 20 MG tablet, Take 20 mg by mouth every night at bedtime., Disp: , Rfl: 3  •  cyanocobalamin 1000 MCG/ML injection, INJECT 1 ML INTRAMUSCULARLY EVERY MONTH, Disp: , Rfl: 3  •  famotidine (PEPCID) 40 MG tablet, TAKE 1 TABLET BY MOUTH EVERY MORNING BEFORE BREAKFAST, Disp: 90 tablet, Rfl: 1  •  hydrochlorothiazide (HYDRODIURIL) 25 MG tablet, Take 25 mg by mouth Daily., Disp: , Rfl:   •  KLOR-CON 20 MEQ CR tablet, TAKE 3 TABLETS BY MOUTH EVERY MORNING AND TAKE 2 TABLETS AT BEDTIME, Disp: 450 tablet, Rfl: 1  •  lisinopril (PRINIVIL,ZESTRIL) 20 MG tablet, Take 20 mg by mouth Daily., Disp: , Rfl:   •  LYRICA 100 MG capsule, Take 1 capsule by mouth 3 (Three) Times a Day., Disp: 90 capsule, Rfl: 2  •  magnesium oxide (MAG-OX) 400 MG tablet, Take 1 tablet by mouth 2 (Two) Times a Day., Disp: 60 tablet, Rfl: 3  •  ondansetron ODT (ZOFRAN-ODT) 8 MG disintegrating tablet, PLACE 1 TABLET IN MOUTH TO DISSOLVE EVERY 8 HOURS AS NEEDED FOR NAUSEA, Disp: 20 tablet, Rfl: 3  •  oxyCODONE (ROXICODONE) 10 MG tablet, Take 1 tablet by mouth 3 (Three) Times a Day As Needed for Moderate Pain ., Disp: 90 tablet, Rfl: 0  •  promethazine (PHENERGAN) 25 MG tablet, Take 1 tablet by mouth Every 6 (Six) Hours As Needed for Nausea or Vomiting., Disp: 12 tablet, Rfl: 0  •  sertraline (ZOLOFT) 50 MG tablet, TAKE 1 TABLET BY MOUTH EVERY EVENING BEFORE BED, Disp: 90 tablet, Rfl: 1  •  Syringe/Needle, Disp, (B-D 3CC LUER-JESSICA SYR 23GX1\") 23G X 1\" 3 ML misc, BD LUER-JESSICA SYRINGE 23G X 1\" 3 ML, Disp: , Rfl:   •  temazepam (RESTORIL) 30 MG capsule, TAKE 1 CAPSULE BY MOUTH ONCE A DAY AT BEDTIME AS NEEDED FOR SLEEP, Disp: 30 capsule, Rfl: 0  •  triamterene-hydrochlorothiazide (DYAZIDE) 37.5-25 MG per capsule, " TAKE 1 CAPSULE BY MOUTH EVERY DAY, Disp: 90 capsule, Rfl: 1  •  valACYclovir (VALTREX) 500 MG tablet, Take 1,000 mg by mouth Daily., Disp: , Rfl:   •  warfarin (COUMADIN) 1 MG tablet, TAKE 1 TABLET BY MOUTH EVERY DAY AS DIRECTED (Patient taking differently: 1/2 tablet daily), Disp: 45 tablet, Rfl: 2  •  WARFARIN SODIUM PO, Take 5 mg by mouth Daily. 5.5mg DAILY, Disp: , Rfl: 3    Allergies   Allergen Reactions   • Morphine Nausea Only and Hives   • Penicillin V Potassium Rash   • Sulfa Antibiotics Rash   • Levaquin [Levofloxacin] Nausea Only and Dizziness     When taken with Coumadin   • Amoxicillin Rash   • Norco [Hydrocodone-Acetaminophen] Rash       Family History   Problem Relation Age of Onset   • Hypertension Mother    • Cancer Father    • Hypertension Father    • Hypertension Sister    • Hypertension Brother    • Stroke Brother        Cancer-related family history includes Cancer in her father.    Social History     Tobacco Use   • Smoking status: Never Smoker   • Smokeless tobacco: Never Used   Substance Use Topics   • Alcohol use: No     Frequency: Never   • Drug use: No       I have reviewed the history of present illness, past medical history, family history, social history, lab results, all notes and other records since the patient was last seen on 9/25/19.    SUBJECTIVE: Patient is here in follow up of recurrent ovarian cancer, Deep vein thrombosis of left upper extremity, anemia and pernicious anemia.       SIGIFREDO Atkinson present during office visit.          REVIEW OF SYSTEMS:  Review of Systems   Constitutional: Negative for chills and fever.   HENT: Negative for ear pain, mouth sores, nosebleeds and sore throat.    Eyes: Negative for photophobia and visual disturbance.   Respiratory: Negative for wheezing and stridor.    Cardiovascular: Negative for chest pain and palpitations.   Gastrointestinal: Negative for abdominal pain, diarrhea, nausea and vomiting.   Endocrine: Negative for cold  "intolerance and heat intolerance.   Genitourinary: Negative for dysuria and hematuria.   Musculoskeletal: Negative for joint swelling and neck stiffness.   Skin: Negative for color change and rash.   Neurological: Negative for seizures and syncope.   Hematological: Negative for adenopathy.        No obvious bleeding   Psychiatric/Behavioral: Negative for agitation, confusion and hallucinations.       OBJECTIVE:    Vitals:    10/29/19 1209   BP: 121/71   Pulse: 76   Resp: 16   Temp: 98.1 °F (36.7 °C)   Weight: 82.3 kg (181 lb 6.4 oz)   Height: 165.1 cm (65\")   PainSc: 0-No pain     ECOG  (1) Restricted in physically strenuous activity, ambulatory and able to do work of light nature    Physical Exam   Constitutional: She is oriented to person, place, and time. No distress.   HENT:   Head: Normocephalic and atraumatic.   Dental fillings   Eyes: Conjunctivae and EOM are normal. Right eye exhibits no discharge. Left eye exhibits no discharge. No scleral icterus.   Eye glasses present.    Neck: Normal range of motion. Neck supple. No thyromegaly present.   Cardiovascular: Normal rate, regular rhythm and normal heart sounds. Exam reveals no gallop and no friction rub.   Pulmonary/Chest: Effort normal. No stridor. No respiratory distress. She has no wheezes.   Abdominal: Soft. Bowel sounds are normal. She exhibits no mass. There is no tenderness. There is no rebound and no guarding.   Musculoskeletal: Normal range of motion. She exhibits no tenderness.   Lymphadenopathy:     She has no cervical adenopathy.   Neurological: She is alert and oriented to person, place, and time. She exhibits normal muscle tone.   Skin: Skin is warm. No rash noted. She is not diaphoretic. No erythema.   Psychiatric: She has a normal mood and affect. Her behavior is normal.   Nursing note and vitals reviewed.    RECENT LABS  WBC   Date Value Ref Range Status   10/29/2019 22.67 (H) 3.40 - 10.80 10*3/mm3 Final     RBC   Date Value Ref Range Status "   10/29/2019 3.26 (L) 3.77 - 5.28 10*6/mm3 Final     Hemoglobin   Date Value Ref Range Status   10/29/2019 11.8 (L) 12.0 - 15.9 g/dL Final     Hematocrit   Date Value Ref Range Status   10/29/2019 34.8 34.0 - 46.6 % Final     MCV   Date Value Ref Range Status   10/29/2019 106.7 (H) 79.0 - 97.0 fL Final     MCH   Date Value Ref Range Status   10/29/2019 36.2 (H) 26.6 - 33.0 pg Final     MCHC   Date Value Ref Range Status   10/29/2019 33.9 31.5 - 35.7 g/dL Final     RDW   Date Value Ref Range Status   10/29/2019 17.3 (H) 12.3 - 15.4 % Final     RDW-SD   Date Value Ref Range Status   10/29/2019 67.0 (H) 37.0 - 54.0 fl Final     MPV   Date Value Ref Range Status   10/29/2019 10.4 6.0 - 12.0 fL Final     Platelets   Date Value Ref Range Status   10/29/2019 178 140 - 450 10*3/mm3 Final     Neutrophil %   Date Value Ref Range Status   10/29/2019 85.8 (H) 42.7 - 76.0 % Final     Lymphocyte %   Date Value Ref Range Status   10/29/2019 6.5 (L) 19.6 - 45.3 % Final     Monocyte %   Date Value Ref Range Status   10/29/2019 7.4 5.0 - 12.0 % Final     Eosinophil %   Date Value Ref Range Status   10/29/2019 0.2 (L) 0.3 - 6.2 % Final     Basophil %   Date Value Ref Range Status   10/29/2019 0.1 0.0 - 1.5 % Final     Neutrophils, Absolute   Date Value Ref Range Status   10/29/2019 19.45 (H) 1.70 - 7.00 10*3/mm3 Final     Lymphocytes, Absolute   Date Value Ref Range Status   10/29/2019 1.48 0.70 - 3.10 10*3/mm3 Final     Monocytes, Absolute   Date Value Ref Range Status   10/29/2019 1.68 (H) 0.10 - 0.90 10*3/mm3 Final     Eosinophils, Absolute   Date Value Ref Range Status   10/29/2019 0.04 0.00 - 0.40 10*3/mm3 Final     Basophils, Absolute   Date Value Ref Range Status   10/29/2019 0.02 0.00 - 0.20 10*3/mm3 Final     nRBC   Date Value Ref Range Status   08/30/2019 0.1 0.0 - 0.2 /100 WBC Final       Lab Results   Component Value Date    GLUCOSE 73 10/25/2019    BUN 15 10/25/2019    CREATININE 0.90 10/25/2019    EGFRIFNONA 64  10/25/2019    EGFRIFAFRI >60 06/07/2019    BCR 16.9 10/25/2019    K 4.3 10/25/2019    CO2 26.0 10/25/2019    CALCIUM 9.3 10/25/2019    ALBUMIN 4.00 10/25/2019    LABIL2 1.5 06/07/2019    AST 24 10/25/2019    ALT 10 10/25/2019       ASSESSMENT:    Malignant neoplasm of right ovary (CMS/HCC)  - CBC & Differential  - CBC & Differential  - CBC Auto Differential  - CT chest w contrast  - CT abdomen pelvis wo contrast    Hypomagnesemia    Other iron deficiency anemia    Malabsorption due to intolerance, not elsewhere classified    Chronic deep vein thrombosis (DVT) of left upper extremity, unspecified vein (CMS/HCC)    Pernicious anemia    Pancytopenia due to antineoplastic chemotherapy (CMS/HCC)    Encounter for antineoplastic chemotherapy    Monitoring for long-term anticoagulant use    The patient claims to be tolerating chemotherapy well and is due for follow-up scans now.  Have made her aware that if her tumors continue to shrink that we will continue her on the same treatment but if they stop shrinking, then we will stop the current treatment and observe her.  Her tumor markers have mostly been in the normal range.  She is taking 3 magnesium pills daily and in addition getting weekly IV replacement as needed.  She is not taking any oral iron as she does not absorb it.  On 5-1/2 mg of Coumadin, her INR is 4.3 today.  Have asked her to hold Coumadin for 1 day and then started 5 mg daily with repeat in 3 days.  She is to continue herself on vitamin B12 injections monthly at home.    PLAN:  Continue chemotherapy.  Continue magnesium oxide 400 mg three times a day and weekly IV replacement as needed.   Hold coumadin today.   Restart coumadin tomorrow at 5 mg daily.   Recheck INR Friday.   CT CAP ordered.   Continue b12 injections IM monthly.  Continue Retacrit 40,000 units subcu weekly for hemoglobin less than 10.         I have reviewed labs results, imaging, vitals, and medications with the patient today. Will follow  up in one month with the nurse practitioner and a CBC.      Patient verbalized understanding and is in agreement of the above plan.      I have reviewed and validated the information above.   Daryn Tay M.D., F.A.C.P.

## 2019-10-25 ENCOUNTER — HOSPITAL ENCOUNTER (OUTPATIENT)
Dept: ONCOLOGY | Facility: HOSPITAL | Age: 64
Setting detail: INFUSION SERIES
Discharge: HOME | End: 2019-10-25

## 2019-10-25 VITALS
SYSTOLIC BLOOD PRESSURE: 131 MMHG | RESPIRATION RATE: 18 BRPM | WEIGHT: 183 LBS | HEIGHT: 65 IN | HEART RATE: 54 BPM | BODY MASS INDEX: 30.49 KG/M2 | DIASTOLIC BLOOD PRESSURE: 85 MMHG | TEMPERATURE: 98 F

## 2019-10-25 DIAGNOSIS — E83.42 HYPOMAGNESEMIA: ICD-10-CM

## 2019-10-25 DIAGNOSIS — D61.810 PANCYTOPENIA DUE TO ANTINEOPLASTIC CHEMOTHERAPY (HCC): ICD-10-CM

## 2019-10-25 DIAGNOSIS — T45.1X5A PANCYTOPENIA DUE TO ANTINEOPLASTIC CHEMOTHERAPY (HCC): ICD-10-CM

## 2019-10-25 DIAGNOSIS — D51.0 PERNICIOUS ANEMIA: ICD-10-CM

## 2019-10-25 DIAGNOSIS — C56.1 MALIGNANT NEOPLASM OF RIGHT OVARY (HCC): Primary | ICD-10-CM

## 2019-10-25 LAB
ALBUMIN SERPL-MCNC: 4 G/DL (ref 3.5–5.2)
ALBUMIN/GLOB SERPL: 1.7 G/DL
ALP SERPL-CCNC: 120 U/L (ref 39–117)
ALT SERPL W P-5'-P-CCNC: 10 U/L (ref 1–33)
ANION GAP SERPL CALCULATED.3IONS-SCNC: 10 MMOL/L (ref 5–15)
AST SERPL-CCNC: 24 U/L (ref 1–32)
BASOPHILS # BLD AUTO: 0.02 10*3/MM3 (ref 0–0.2)
BASOPHILS NFR BLD AUTO: 0.6 % (ref 0–1.5)
BILIRUB SERPL-MCNC: 0.2 MG/DL (ref 0.2–1.2)
BUN BLD-MCNC: 15 MG/DL (ref 8–23)
BUN/CREAT SERPL: 16.9 (ref 7–25)
CALCIUM SPEC-SCNC: 9.3 MG/DL (ref 8.6–10.5)
CHLORIDE SERPL-SCNC: 106 MMOL/L (ref 98–107)
CO2 SERPL-SCNC: 26 MMOL/L (ref 22–29)
CREAT BLD-MCNC: 0.89 MG/DL (ref 0.57–1)
CREAT BLDA-MCNC: 0.9 MG/DL (ref 0.6–1.3)
DEPRECATED RDW RBC AUTO: 67 FL (ref 37–54)
EOSINOPHIL # BLD AUTO: 0.06 10*3/MM3 (ref 0–0.4)
EOSINOPHIL NFR BLD AUTO: 1.8 % (ref 0.3–6.2)
ERYTHROCYTE [DISTWIDTH] IN BLOOD BY AUTOMATED COUNT: 17.5 % (ref 12.3–15.4)
GFR SERPL CREATININE-BSD FRML MDRD: 64 ML/MIN/1.73
GLOBULIN UR ELPH-MCNC: 2.3 GM/DL
GLUCOSE BLD-MCNC: 73 MG/DL (ref 65–99)
HCT VFR BLD AUTO: 30.1 % (ref 34–46.6)
HGB BLD-MCNC: 9.7 G/DL (ref 12–15.9)
LYMPHOCYTES # BLD AUTO: 1.1 10*3/MM3 (ref 0.7–3.1)
LYMPHOCYTES NFR BLD AUTO: 32.4 % (ref 19.6–45.3)
MAGNESIUM SERPL-MCNC: 1.6 MG/DL (ref 1.6–2.4)
MCH RBC QN AUTO: 34.6 PG (ref 26.6–33)
MCHC RBC AUTO-ENTMCNC: 32.2 G/DL (ref 31.5–35.7)
MCV RBC AUTO: 107.5 FL (ref 79–97)
MONOCYTES # BLD AUTO: 0.87 10*3/MM3 (ref 0.1–0.9)
MONOCYTES NFR BLD AUTO: 25.7 % (ref 5–12)
NEUTROPHILS # BLD AUTO: 1.34 10*3/MM3 (ref 1.7–7)
NEUTROPHILS NFR BLD AUTO: 39.5 % (ref 42.7–76)
PLATELET # BLD AUTO: 163 10*3/MM3 (ref 140–450)
PMV BLD AUTO: 10.5 FL (ref 6–12)
POTASSIUM BLD-SCNC: 4.3 MMOL/L (ref 3.5–5.2)
PROT SERPL-MCNC: 6.3 G/DL (ref 6–8.5)
RBC # BLD AUTO: 2.8 10*6/MM3 (ref 3.77–5.28)
SODIUM BLD-SCNC: 142 MMOL/L (ref 136–145)
WBC NRBC COR # BLD: 3.39 10*3/MM3 (ref 3.4–10.8)

## 2019-10-25 PROCEDURE — 25010000002 MAGNESIUM SULFATE 2 GM/50ML SOLUTION: Performed by: NURSE PRACTITIONER

## 2019-10-25 PROCEDURE — 96377 APPLICATON ON-BODY INJECTOR: CPT | Performed by: INTERNAL MEDICINE

## 2019-10-25 PROCEDURE — 25010000002 PALONOSETRON 0.25 MG/5ML SOLUTION PREFILLED SYRINGE: Performed by: INTERNAL MEDICINE

## 2019-10-25 PROCEDURE — 83735 ASSAY OF MAGNESIUM: CPT | Performed by: NURSE PRACTITIONER

## 2019-10-25 PROCEDURE — 25010000002 DEXAMETHASONE SODIUM PHOSPHATE 20 MG/5ML SOLUTION: Performed by: INTERNAL MEDICINE

## 2019-10-25 PROCEDURE — 96417 CHEMO IV INFUS EACH ADDL SEQ: CPT | Performed by: INTERNAL MEDICINE

## 2019-10-25 PROCEDURE — 25010000002 DOXORUBICIN LIPOSOMAL PER 10 MG: Performed by: INTERNAL MEDICINE

## 2019-10-25 PROCEDURE — 82565 ASSAY OF CREATININE: CPT

## 2019-10-25 PROCEDURE — 96375 TX/PRO/DX INJ NEW DRUG ADDON: CPT | Performed by: INTERNAL MEDICINE

## 2019-10-25 PROCEDURE — 96372 THER/PROPH/DIAG INJ SC/IM: CPT | Performed by: INTERNAL MEDICINE

## 2019-10-25 PROCEDURE — 96367 TX/PROPH/DG ADDL SEQ IV INF: CPT | Performed by: INTERNAL MEDICINE

## 2019-10-25 PROCEDURE — 85025 COMPLETE CBC W/AUTO DIFF WBC: CPT | Performed by: INTERNAL MEDICINE

## 2019-10-25 PROCEDURE — 25010000002 PEGFILGRASTIM 6 MG/0.6ML PREFILLED SYRINGE KIT: Performed by: INTERNAL MEDICINE

## 2019-10-25 PROCEDURE — 96413 CHEMO IV INFUSION 1 HR: CPT | Performed by: INTERNAL MEDICINE

## 2019-10-25 PROCEDURE — 25010000002 EPOETIN ALFA-EPBX 40000 UNIT/ML SOLUTION: Performed by: INTERNAL MEDICINE

## 2019-10-25 PROCEDURE — 25010000002 FOSAPREPITANT PER 1 MG: Performed by: INTERNAL MEDICINE

## 2019-10-25 PROCEDURE — 25010000002 CARBOPLATIN PER 50 MG: Performed by: INTERNAL MEDICINE

## 2019-10-25 PROCEDURE — 80053 COMPREHEN METABOLIC PANEL: CPT | Performed by: INTERNAL MEDICINE

## 2019-10-25 RX ORDER — PALONOSETRON HYDROCHLORIDE 0.05 MG/ML
0.25 INJECTION, SOLUTION INTRAVENOUS ONCE
Status: COMPLETED | OUTPATIENT
Start: 2019-10-25 | End: 2019-10-25

## 2019-10-25 RX ORDER — SODIUM CHLORIDE 0.9 % (FLUSH) 0.9 %
10 SYRINGE (ML) INJECTION AS NEEDED
Status: CANCELLED | OUTPATIENT
Start: 2019-10-25

## 2019-10-25 RX ORDER — SODIUM CHLORIDE 0.9 % (FLUSH) 0.9 %
10 SYRINGE (ML) INJECTION AS NEEDED
Status: DISCONTINUED | OUTPATIENT
Start: 2019-10-25 | End: 2019-10-26 | Stop reason: HOSPADM

## 2019-10-25 RX ORDER — FAMOTIDINE 10 MG/ML
20 INJECTION, SOLUTION INTRAVENOUS AS NEEDED
Status: CANCELLED | OUTPATIENT
Start: 2019-10-25

## 2019-10-25 RX ORDER — SODIUM CHLORIDE 9 MG/ML
250 INJECTION, SOLUTION INTRAVENOUS ONCE
Status: DISCONTINUED | OUTPATIENT
Start: 2019-10-25 | End: 2019-10-26 | Stop reason: HOSPADM

## 2019-10-25 RX ORDER — MAGNESIUM SULFATE HEPTAHYDRATE 40 MG/ML
2 INJECTION, SOLUTION INTRAVENOUS ONCE
Status: COMPLETED | OUTPATIENT
Start: 2019-10-25 | End: 2019-10-25

## 2019-10-25 RX ORDER — DEXTROSE MONOHYDRATE 50 MG/ML
250 INJECTION, SOLUTION INTRAVENOUS ONCE
Status: COMPLETED | OUTPATIENT
Start: 2019-10-25 | End: 2019-10-25

## 2019-10-25 RX ORDER — DIPHENHYDRAMINE HYDROCHLORIDE 50 MG/ML
50 INJECTION INTRAMUSCULAR; INTRAVENOUS AS NEEDED
Status: CANCELLED | OUTPATIENT
Start: 2019-10-25

## 2019-10-25 RX ADMIN — DEXTROSE MONOHYDRATE 250 ML: 5 INJECTION, SOLUTION INTRAVENOUS at 10:08

## 2019-10-25 RX ADMIN — DEXAMETHASONE SODIUM PHOSPHATE 8 MG: 4 INJECTION INTRA-ARTICULAR; INTRALESIONAL; INTRAMUSCULAR; INTRAVENOUS; SOFT TISSUE at 10:45

## 2019-10-25 RX ADMIN — Medication 10 ML: at 14:07

## 2019-10-25 RX ADMIN — HEPARIN 500 UNITS: 100 SYRINGE at 14:07

## 2019-10-25 RX ADMIN — PEGFILGRASTIM 6 MG: KIT SUBCUTANEOUS at 14:05

## 2019-10-25 RX ADMIN — EPOETIN ALFA-EPBX 40000 UNITS: 40000 INJECTION, SOLUTION INTRAVENOUS; SUBCUTANEOUS at 14:08

## 2019-10-25 RX ADMIN — PALONOSETRON 0.25 MG: 0.25 INJECTION, SOLUTION INTRAVENOUS at 10:08

## 2019-10-25 RX ADMIN — CARBOPLATIN 539 MG: 10 INJECTION, SOLUTION INTRAVENOUS at 12:17

## 2019-10-25 RX ADMIN — SODIUM CHLORIDE 100 ML: 900 INJECTION, SOLUTION INTRAVENOUS at 10:10

## 2019-10-25 RX ADMIN — MAGNESIUM SULFATE HEPTAHYDRATE 2 G: 40 INJECTION, SOLUTION INTRAVENOUS at 13:02

## 2019-10-25 RX ADMIN — DOXORUBICIN HYDROCHLORIDE 56 MG: 2 INJECTABLE, LIPOSOMAL INTRAVENOUS at 11:10

## 2019-10-28 DIAGNOSIS — C56.1 MALIGNANT NEOPLASM OF RIGHT OVARY (HCC): ICD-10-CM

## 2019-10-28 RX ORDER — DIPHENHYDRAMINE HYDROCHLORIDE 50 MG/ML
50 INJECTION INTRAMUSCULAR; INTRAVENOUS AS NEEDED
Status: CANCELLED | OUTPATIENT
Start: 2019-11-22

## 2019-10-28 RX ORDER — PALONOSETRON 0.05 MG/ML
0.25 INJECTION, SOLUTION INTRAVENOUS ONCE
Status: CANCELLED | OUTPATIENT
Start: 2019-11-22

## 2019-10-28 RX ORDER — DEXTROSE MONOHYDRATE 50 MG/ML
250 INJECTION, SOLUTION INTRAVENOUS ONCE
Status: CANCELLED | OUTPATIENT
Start: 2019-11-22

## 2019-10-28 RX ORDER — FAMOTIDINE 10 MG/ML
20 INJECTION, SOLUTION INTRAVENOUS AS NEEDED
Status: CANCELLED | OUTPATIENT
Start: 2019-11-22

## 2019-10-29 ENCOUNTER — OFFICE VISIT (OUTPATIENT)
Dept: ONCOLOGY | Facility: CLINIC | Age: 64
End: 2019-10-29

## 2019-10-29 ENCOUNTER — HOSPITAL ENCOUNTER (OUTPATIENT)
Dept: ONCOLOGY | Facility: HOSPITAL | Age: 64
Discharge: HOME | End: 2019-10-29
Admitting: NURSE PRACTITIONER

## 2019-10-29 ENCOUNTER — HOSPITAL ENCOUNTER (OUTPATIENT)
Dept: ONCOLOGY | Facility: HOSPITAL | Age: 64
Discharge: HOME | End: 2019-10-29

## 2019-10-29 ENCOUNTER — TELEPHONE (OUTPATIENT)
Dept: ONCOLOGY | Facility: CLINIC | Age: 64
End: 2019-10-29

## 2019-10-29 VITALS
RESPIRATION RATE: 16 BRPM | HEIGHT: 65 IN | TEMPERATURE: 98.1 F | WEIGHT: 181.4 LBS | SYSTOLIC BLOOD PRESSURE: 121 MMHG | BODY MASS INDEX: 30.22 KG/M2 | DIASTOLIC BLOOD PRESSURE: 71 MMHG | HEART RATE: 76 BPM

## 2019-10-29 DIAGNOSIS — K90.49 MALABSORPTION DUE TO INTOLERANCE, NOT ELSEWHERE CLASSIFIED: ICD-10-CM

## 2019-10-29 DIAGNOSIS — E83.42 HYPOMAGNESEMIA: ICD-10-CM

## 2019-10-29 DIAGNOSIS — D51.0 PERNICIOUS ANEMIA: ICD-10-CM

## 2019-10-29 DIAGNOSIS — I82.722 CHRONIC DEEP VEIN THROMBOSIS (DVT) OF LEFT UPPER EXTREMITY, UNSPECIFIED VEIN (HCC): ICD-10-CM

## 2019-10-29 DIAGNOSIS — D61.810 PANCYTOPENIA DUE TO ANTINEOPLASTIC CHEMOTHERAPY (HCC): ICD-10-CM

## 2019-10-29 DIAGNOSIS — I82.722 CHRONIC DEEP VEIN THROMBOSIS (DVT) OF LEFT UPPER EXTREMITY, UNSPECIFIED VEIN (HCC): Primary | ICD-10-CM

## 2019-10-29 DIAGNOSIS — Z51.81 MONITORING FOR LONG-TERM ANTICOAGULANT USE: ICD-10-CM

## 2019-10-29 DIAGNOSIS — Z79.01 MONITORING FOR LONG-TERM ANTICOAGULANT USE: ICD-10-CM

## 2019-10-29 DIAGNOSIS — T45.1X5A PANCYTOPENIA DUE TO ANTINEOPLASTIC CHEMOTHERAPY (HCC): ICD-10-CM

## 2019-10-29 DIAGNOSIS — C56.1 MALIGNANT NEOPLASM OF RIGHT OVARY (HCC): Primary | ICD-10-CM

## 2019-10-29 DIAGNOSIS — Z51.11 ENCOUNTER FOR ANTINEOPLASTIC CHEMOTHERAPY: ICD-10-CM

## 2019-10-29 DIAGNOSIS — D50.8 OTHER IRON DEFICIENCY ANEMIA: ICD-10-CM

## 2019-10-29 LAB
BASOPHILS # BLD AUTO: 0.02 10*3/MM3 (ref 0–0.2)
BASOPHILS NFR BLD AUTO: 0.1 % (ref 0–1.5)
DEPRECATED RDW RBC AUTO: 67 FL (ref 37–54)
EOSINOPHIL # BLD AUTO: 0.04 10*3/MM3 (ref 0–0.4)
EOSINOPHIL NFR BLD AUTO: 0.2 % (ref 0.3–6.2)
ERYTHROCYTE [DISTWIDTH] IN BLOOD BY AUTOMATED COUNT: 17.3 % (ref 12.3–15.4)
HCT VFR BLD AUTO: 34.8 % (ref 34–46.6)
HGB BLD-MCNC: 11.8 G/DL (ref 12–15.9)
INR PPP: 4.3 (ref 2–3)
LYMPHOCYTES # BLD AUTO: 1.48 10*3/MM3 (ref 0.7–3.1)
LYMPHOCYTES NFR BLD AUTO: 6.5 % (ref 19.6–45.3)
MCH RBC QN AUTO: 36.2 PG (ref 26.6–33)
MCHC RBC AUTO-ENTMCNC: 33.9 G/DL (ref 31.5–35.7)
MCV RBC AUTO: 106.7 FL (ref 79–97)
MONOCYTES # BLD AUTO: 1.68 10*3/MM3 (ref 0.1–0.9)
MONOCYTES NFR BLD AUTO: 7.4 % (ref 5–12)
NEUTROPHILS # BLD AUTO: 19.45 10*3/MM3 (ref 1.7–7)
NEUTROPHILS NFR BLD AUTO: 85.8 % (ref 42.7–76)
PLATELET # BLD AUTO: 178 10*3/MM3 (ref 140–450)
PMV BLD AUTO: 10.4 FL (ref 6–12)
RBC # BLD AUTO: 3.26 10*6/MM3 (ref 3.77–5.28)
WBC NRBC COR # BLD: 22.67 10*3/MM3 (ref 3.4–10.8)

## 2019-10-29 PROCEDURE — 99215 OFFICE O/P EST HI 40 MIN: CPT | Performed by: INTERNAL MEDICINE

## 2019-10-29 PROCEDURE — 85025 COMPLETE CBC W/AUTO DIFF WBC: CPT | Performed by: INTERNAL MEDICINE

## 2019-10-29 PROCEDURE — 36415 COLL VENOUS BLD VENIPUNCTURE: CPT | Performed by: INTERNAL MEDICINE

## 2019-10-29 PROCEDURE — 36416 COLLJ CAPILLARY BLOOD SPEC: CPT

## 2019-10-29 PROCEDURE — 85610 PROTHROMBIN TIME: CPT

## 2019-10-29 NOTE — TELEPHONE ENCOUNTER
Received call from pt's daughter stating that pt was here today to see Dr Tay and she is looking at the pt's AVS.  She is concerned with her white count being high.  Chart reviewed and pt receives Neulasta with her chemo treatments and received it last on 10/25.  Informed her that this is the reason for the increase in her white count.  She v/u.

## 2019-10-31 DIAGNOSIS — T45.1X5A ANTINEOPLASTIC CHEMOTHERAPY INDUCED ANEMIA: ICD-10-CM

## 2019-10-31 DIAGNOSIS — D61.810 PANCYTOPENIA DUE TO ANTINEOPLASTIC CHEMOTHERAPY (HCC): ICD-10-CM

## 2019-10-31 DIAGNOSIS — D64.81 ANTINEOPLASTIC CHEMOTHERAPY INDUCED ANEMIA: ICD-10-CM

## 2019-10-31 DIAGNOSIS — E83.42 HYPOMAGNESEMIA: ICD-10-CM

## 2019-10-31 DIAGNOSIS — T45.1X5A PANCYTOPENIA DUE TO ANTINEOPLASTIC CHEMOTHERAPY (HCC): ICD-10-CM

## 2019-10-31 RX ORDER — MAGNESIUM SULFATE HEPTAHYDRATE 40 MG/ML
2 INJECTION, SOLUTION INTRAVENOUS ONCE
Status: CANCELLED | OUTPATIENT
Start: 2019-11-08

## 2019-10-31 RX ORDER — SODIUM CHLORIDE 9 MG/ML
250 INJECTION, SOLUTION INTRAVENOUS ONCE
Status: CANCELLED | OUTPATIENT
Start: 2019-11-18

## 2019-10-31 RX ORDER — SODIUM CHLORIDE 9 MG/ML
250 INJECTION, SOLUTION INTRAVENOUS ONCE
Status: CANCELLED | OUTPATIENT
Start: 2019-11-27

## 2019-10-31 RX ORDER — SODIUM CHLORIDE 9 MG/ML
250 INJECTION, SOLUTION INTRAVENOUS ONCE
Status: CANCELLED | OUTPATIENT
Start: 2019-11-08

## 2019-10-31 RX ORDER — MAGNESIUM SULFATE HEPTAHYDRATE 40 MG/ML
2 INJECTION, SOLUTION INTRAVENOUS ONCE
Status: CANCELLED | OUTPATIENT
Start: 2019-11-01

## 2019-10-31 RX ORDER — MAGNESIUM SULFATE HEPTAHYDRATE 40 MG/ML
2 INJECTION, SOLUTION INTRAVENOUS ONCE
Status: CANCELLED | OUTPATIENT
Start: 2019-11-27

## 2019-10-31 RX ORDER — MAGNESIUM SULFATE HEPTAHYDRATE 40 MG/ML
2 INJECTION, SOLUTION INTRAVENOUS ONCE
Status: CANCELLED | OUTPATIENT
Start: 2019-11-18

## 2019-10-31 RX ORDER — SODIUM CHLORIDE 9 MG/ML
250 INJECTION, SOLUTION INTRAVENOUS ONCE
Status: CANCELLED | OUTPATIENT
Start: 2019-11-01

## 2019-11-01 ENCOUNTER — LAB (OUTPATIENT)
Dept: LAB | Facility: HOSPITAL | Age: 64
End: 2019-11-01

## 2019-11-01 ENCOUNTER — DOCUMENTATION (OUTPATIENT)
Dept: ONCOLOGY | Facility: CLINIC | Age: 64
End: 2019-11-01

## 2019-11-01 ENCOUNTER — HOSPITAL ENCOUNTER (OUTPATIENT)
Dept: ONCOLOGY | Facility: HOSPITAL | Age: 64
Discharge: HOME | End: 2019-11-01

## 2019-11-01 ENCOUNTER — HOSPITAL ENCOUNTER (OUTPATIENT)
Dept: ONCOLOGY | Facility: HOSPITAL | Age: 64
Discharge: HOME | End: 2019-11-01
Admitting: NURSE PRACTITIONER

## 2019-11-01 VITALS
HEIGHT: 65 IN | OXYGEN SATURATION: 100 % | WEIGHT: 185 LBS | DIASTOLIC BLOOD PRESSURE: 67 MMHG | TEMPERATURE: 97.7 F | BODY MASS INDEX: 30.82 KG/M2 | HEART RATE: 66 BPM | RESPIRATION RATE: 18 BRPM | SYSTOLIC BLOOD PRESSURE: 109 MMHG

## 2019-11-01 DIAGNOSIS — C56.1 MALIGNANT NEOPLASM OF RIGHT OVARY (HCC): Primary | ICD-10-CM

## 2019-11-01 DIAGNOSIS — E83.42 HYPOMAGNESEMIA: ICD-10-CM

## 2019-11-01 DIAGNOSIS — I82.722 CHRONIC DEEP VEIN THROMBOSIS (DVT) OF LEFT UPPER EXTREMITY, UNSPECIFIED VEIN (HCC): Primary | ICD-10-CM

## 2019-11-01 DIAGNOSIS — R13.19 ESOPHAGEAL DYSPHAGIA: Primary | ICD-10-CM

## 2019-11-01 DIAGNOSIS — I82.722 CHRONIC DEEP VEIN THROMBOSIS (DVT) OF LEFT UPPER EXTREMITY, UNSPECIFIED VEIN (HCC): ICD-10-CM

## 2019-11-01 LAB
BASOPHILS # BLD AUTO: 0.01 10*3/MM3 (ref 0–0.2)
BASOPHILS NFR BLD AUTO: 0.1 % (ref 0–1.5)
DEPRECATED RDW RBC AUTO: 65.2 FL (ref 37–54)
EOSINOPHIL # BLD AUTO: 0.06 10*3/MM3 (ref 0–0.4)
EOSINOPHIL NFR BLD AUTO: 0.7 % (ref 0.3–6.2)
ERYTHROCYTE [DISTWIDTH] IN BLOOD BY AUTOMATED COUNT: 17 % (ref 12.3–15.4)
HCT VFR BLD AUTO: 30.5 % (ref 34–46.6)
HGB BLD-MCNC: 10.3 G/DL (ref 12–15.9)
INR PPP: 1.8
INR PPP: 1.8 (ref 2–3)
LYMPHOCYTES # BLD AUTO: 1.06 10*3/MM3 (ref 0.7–3.1)
LYMPHOCYTES NFR BLD AUTO: 11.5 % (ref 19.6–45.3)
MAGNESIUM SERPL-MCNC: 1.4 MG/DL (ref 1.6–2.4)
MCH RBC QN AUTO: 35.9 PG (ref 26.6–33)
MCHC RBC AUTO-ENTMCNC: 33.8 G/DL (ref 31.5–35.7)
MCV RBC AUTO: 106.3 FL (ref 79–97)
MONOCYTES # BLD AUTO: 0.86 10*3/MM3 (ref 0.1–0.9)
MONOCYTES NFR BLD AUTO: 9.4 % (ref 5–12)
NEUTROPHILS # BLD AUTO: 7.19 10*3/MM3 (ref 1.7–7)
NEUTROPHILS NFR BLD AUTO: 78.3 % (ref 42.7–76)
PLATELET # BLD AUTO: 113 10*3/MM3 (ref 140–450)
PMV BLD AUTO: 11.3 FL (ref 6–12)
RBC # BLD AUTO: 2.87 10*6/MM3 (ref 3.77–5.28)
WBC NRBC COR # BLD: 9.18 10*3/MM3 (ref 3.4–10.8)

## 2019-11-01 PROCEDURE — 85610 PROTHROMBIN TIME: CPT

## 2019-11-01 PROCEDURE — 83735 ASSAY OF MAGNESIUM: CPT | Performed by: NURSE PRACTITIONER

## 2019-11-01 PROCEDURE — 96365 THER/PROPH/DIAG IV INF INIT: CPT | Performed by: INTERNAL MEDICINE

## 2019-11-01 PROCEDURE — 85025 COMPLETE CBC W/AUTO DIFF WBC: CPT | Performed by: NURSE PRACTITIONER

## 2019-11-01 PROCEDURE — 36416 COLLJ CAPILLARY BLOOD SPEC: CPT

## 2019-11-01 PROCEDURE — 25010000002 MAGNESIUM SULFATE 2 GM/50ML SOLUTION: Performed by: NURSE PRACTITIONER

## 2019-11-01 RX ORDER — SODIUM CHLORIDE 0.9 % (FLUSH) 0.9 %
10 SYRINGE (ML) INJECTION AS NEEDED
Status: CANCELLED | OUTPATIENT
Start: 2019-11-01

## 2019-11-01 RX ORDER — SODIUM CHLORIDE 0.9 % (FLUSH) 0.9 %
10 SYRINGE (ML) INJECTION AS NEEDED
Status: DISCONTINUED | OUTPATIENT
Start: 2019-11-01 | End: 2019-11-02 | Stop reason: HOSPADM

## 2019-11-01 RX ORDER — MAGNESIUM SULFATE HEPTAHYDRATE 40 MG/ML
2 INJECTION, SOLUTION INTRAVENOUS ONCE
Status: COMPLETED | OUTPATIENT
Start: 2019-11-01 | End: 2019-11-01

## 2019-11-01 RX ADMIN — MAGNESIUM SULFATE HEPTAHYDRATE 2 G: 40 INJECTION, SOLUTION INTRAVENOUS at 09:26

## 2019-11-01 RX ADMIN — Medication 10 ML: at 10:26

## 2019-11-01 RX ADMIN — HEPARIN 500 UNITS: 100 SYRINGE at 10:27

## 2019-11-01 NOTE — PROGRESS NOTES
"                 Nutrition Assessment  Tahira [place]    Anthropometrics  Height: 5'5\"  Weight: 184.9#  BMI: 30.8  Weight Hx:   (11/3/17) 182.7#  (12/13/17) 175.9#  (2/6/18) 171#  (4/23/18) 162#  (8/2/18) 160#  (2/14/19) 169.9#  (4/9/19)179.8#  (7/2/19) 180.5#  (8/27/19) 182#  (10/18/19) 184#  IBW: 125# (147.9%)  UBW: 180# - 185# (99.9% - 102.7#)    Nutrition Questionnaire    Food Allergies: Pt denied allergies at this time    Chewing/Swallowing Problems: Pt denied problems chewing, but stated she's been having difficulty swallowing. Pt said she has to consciously focus on swallowing and food still feels like it's getting stuck.     Who does the cooking & shopping: Pt cooks, and her son-in-law shops.    Nausea/Vomiting/Diarrhea: Pt denied n/v/d, reports some constipation.    Appetite: Normal    24-Hour Diet Recall:   Breakfast - two peanut butter crackers, sweet tea  Lunch - [employer]: Frito beef burrito, sweet tea  Dinner - hot dog with bun, water  Snack - Halloween candy    Discussion: Met with pt per Bailey Singh RN request. Pt stated she's having difficulty swallowing and has been for the past several months. She said she's having to concentrate to swallow and food still feels like it's getting stuck. Pt said it's hindering her ability to eat certain foods and is relying on soft/purreed foods for most meals. Pt said she's had her esophagus stretched before due to a narrowing esophagus. I recommend she have speech therapy do a swallow evaluation. Aside from difficulty swallowing, pt denied any other nutrition-related issues. All questions and concerns were addressed and answered. I provided my contact information and encouraged her to call or schedule an appointment as needed.      Kit Ruiz, MS,RD,LD-[place],CD-[place]  Registered Dietitian          "

## 2019-11-01 NOTE — PROGRESS NOTES
Pt to room 29 for Mg infusion today pt reports that she has some trouble swallowing at times that she has to stop and think about it first states she didn't feel like she was having any trouble until she started with this chemo tx. Denies any trouble with appetite or chewing. Will notify MD and or dietician.

## 2019-11-03 DIAGNOSIS — E83.42 HYPOMAGNESEMIA: ICD-10-CM

## 2019-11-03 RX ORDER — MAGNESIUM SULFATE HEPTAHYDRATE 40 MG/ML
2 INJECTION, SOLUTION INTRAVENOUS ONCE
Status: CANCELLED | OUTPATIENT
Start: 2019-12-06

## 2019-11-03 RX ORDER — SODIUM CHLORIDE 9 MG/ML
250 INJECTION, SOLUTION INTRAVENOUS ONCE
Status: CANCELLED | OUTPATIENT
Start: 2019-12-06

## 2019-11-04 ENCOUNTER — TELEPHONE (OUTPATIENT)
Dept: ONCOLOGY | Facility: CLINIC | Age: 64
End: 2019-11-04

## 2019-11-04 NOTE — TELEPHONE ENCOUNTER
----- Message from IRENA Hoffman sent at 11/1/2019  3:28 PM EDT -----  Please let patient know I will refer her to Dr. Henson to evaluate her dysphasia that she spoke with the dietitian about.

## 2019-11-07 ENCOUNTER — APPOINTMENT (OUTPATIENT)
Dept: CT IMAGING | Facility: HOSPITAL | Age: 64
End: 2019-11-07

## 2019-11-07 ENCOUNTER — TELEPHONE (OUTPATIENT)
Dept: PAIN MEDICINE | Facility: CLINIC | Age: 64
End: 2019-11-07

## 2019-11-07 NOTE — TELEPHONE ENCOUNTER
Going out of town flying out the 10th, can she fill her Oxycodone on the 10th? It has a DNF of 11/12.

## 2019-11-08 ENCOUNTER — HOSPITAL ENCOUNTER (OUTPATIENT)
Dept: ONCOLOGY | Facility: HOSPITAL | Age: 64
Discharge: HOME | End: 2019-11-08
Admitting: NURSE PRACTITIONER

## 2019-11-08 ENCOUNTER — HOSPITAL ENCOUNTER (OUTPATIENT)
Dept: ONCOLOGY | Facility: HOSPITAL | Age: 64
Discharge: HOME | End: 2019-11-08

## 2019-11-08 ENCOUNTER — TELEPHONE (OUTPATIENT)
Dept: PAIN MEDICINE | Facility: HOSPITAL | Age: 64
End: 2019-11-08

## 2019-11-08 ENCOUNTER — LAB (OUTPATIENT)
Dept: LAB | Facility: HOSPITAL | Age: 64
End: 2019-11-08

## 2019-11-08 VITALS
HEIGHT: 65 IN | WEIGHT: 185 LBS | RESPIRATION RATE: 18 BRPM | SYSTOLIC BLOOD PRESSURE: 119 MMHG | BODY MASS INDEX: 30.82 KG/M2 | DIASTOLIC BLOOD PRESSURE: 74 MMHG | HEART RATE: 55 BPM | TEMPERATURE: 97.9 F

## 2019-11-08 DIAGNOSIS — T45.1X5A PANCYTOPENIA DUE TO ANTINEOPLASTIC CHEMOTHERAPY (HCC): ICD-10-CM

## 2019-11-08 DIAGNOSIS — D61.810 PANCYTOPENIA DUE TO ANTINEOPLASTIC CHEMOTHERAPY (HCC): ICD-10-CM

## 2019-11-08 DIAGNOSIS — T45.1X5A ANTINEOPLASTIC CHEMOTHERAPY INDUCED ANEMIA: ICD-10-CM

## 2019-11-08 DIAGNOSIS — D64.81 ANTINEOPLASTIC CHEMOTHERAPY INDUCED ANEMIA: ICD-10-CM

## 2019-11-08 DIAGNOSIS — I82.722 CHRONIC DEEP VEIN THROMBOSIS (DVT) OF LEFT UPPER EXTREMITY, UNSPECIFIED VEIN (HCC): Primary | ICD-10-CM

## 2019-11-08 DIAGNOSIS — E83.42 HYPOMAGNESEMIA: Primary | ICD-10-CM

## 2019-11-08 DIAGNOSIS — C56.1 MALIGNANT NEOPLASM OF RIGHT OVARY (HCC): ICD-10-CM

## 2019-11-08 DIAGNOSIS — I82.722 CHRONIC DEEP VEIN THROMBOSIS (DVT) OF LEFT UPPER EXTREMITY, UNSPECIFIED VEIN (HCC): ICD-10-CM

## 2019-11-08 LAB
BASOPHILS # BLD AUTO: 0.01 10*3/MM3 (ref 0–0.2)
BASOPHILS NFR BLD AUTO: 0.2 % (ref 0–1.5)
DEPRECATED RDW RBC AUTO: 67.1 FL (ref 37–54)
EOSINOPHIL # BLD AUTO: 0.08 10*3/MM3 (ref 0–0.4)
EOSINOPHIL NFR BLD AUTO: 1.7 % (ref 0.3–6.2)
ERYTHROCYTE [DISTWIDTH] IN BLOOD BY AUTOMATED COUNT: 17.4 % (ref 12.3–15.4)
HCT VFR BLD AUTO: 29.6 % (ref 34–46.6)
HGB BLD-MCNC: 9.5 G/DL (ref 12–15.9)
INR PPP: 1.9
INR PPP: 1.9 (ref 2–3)
LYMPHOCYTES # BLD AUTO: 1.05 10*3/MM3 (ref 0.7–3.1)
LYMPHOCYTES NFR BLD AUTO: 22.8 % (ref 19.6–45.3)
MAGNESIUM SERPL-MCNC: 1.5 MG/DL (ref 1.6–2.4)
MCH RBC QN AUTO: 35.3 PG (ref 26.6–33)
MCHC RBC AUTO-ENTMCNC: 32.1 G/DL (ref 31.5–35.7)
MCV RBC AUTO: 110 FL (ref 79–97)
MONOCYTES # BLD AUTO: 0.48 10*3/MM3 (ref 0.1–0.9)
MONOCYTES NFR BLD AUTO: 10.4 % (ref 5–12)
NEUTROPHILS # BLD AUTO: 2.99 10*3/MM3 (ref 1.7–7)
NEUTROPHILS NFR BLD AUTO: 64.9 % (ref 42.7–76)
PLATELET # BLD AUTO: 67 10*3/MM3 (ref 140–450)
PMV BLD AUTO: 11.7 FL (ref 6–12)
RBC # BLD AUTO: 2.69 10*6/MM3 (ref 3.77–5.28)
WBC NRBC COR # BLD: 4.61 10*3/MM3 (ref 3.4–10.8)

## 2019-11-08 PROCEDURE — 96365 THER/PROPH/DIAG IV INF INIT: CPT | Performed by: INTERNAL MEDICINE

## 2019-11-08 PROCEDURE — 83735 ASSAY OF MAGNESIUM: CPT | Performed by: NURSE PRACTITIONER

## 2019-11-08 PROCEDURE — 36416 COLLJ CAPILLARY BLOOD SPEC: CPT

## 2019-11-08 PROCEDURE — 85610 PROTHROMBIN TIME: CPT

## 2019-11-08 PROCEDURE — 85025 COMPLETE CBC W/AUTO DIFF WBC: CPT | Performed by: NURSE PRACTITIONER

## 2019-11-08 PROCEDURE — 25010000002 MAGNESIUM SULFATE 2 GM/50ML SOLUTION: Performed by: NURSE PRACTITIONER

## 2019-11-08 RX ORDER — WARFARIN SODIUM 5 MG/1
6 TABLET ORAL DAILY
Refills: 3 | COMMUNITY
Start: 2019-10-30 | End: 2019-11-08

## 2019-11-08 RX ORDER — SODIUM CHLORIDE 0.9 % (FLUSH) 0.9 %
10 SYRINGE (ML) INJECTION AS NEEDED
Status: CANCELLED | OUTPATIENT
Start: 2019-11-08

## 2019-11-08 RX ORDER — MAGNESIUM SULFATE HEPTAHYDRATE 40 MG/ML
2 INJECTION, SOLUTION INTRAVENOUS ONCE
Status: COMPLETED | OUTPATIENT
Start: 2019-11-08 | End: 2019-11-08

## 2019-11-08 RX ORDER — TEMAZEPAM 30 MG/1
CAPSULE ORAL
Qty: 30 CAPSULE | Refills: 0 | Status: SHIPPED | OUTPATIENT
Start: 2019-11-08 | End: 2019-12-08

## 2019-11-08 RX ORDER — SODIUM CHLORIDE 9 MG/ML
250 INJECTION, SOLUTION INTRAVENOUS ONCE
Status: DISCONTINUED | OUTPATIENT
Start: 2019-11-08 | End: 2019-11-09 | Stop reason: HOSPADM

## 2019-11-08 RX ORDER — SODIUM CHLORIDE 0.9 % (FLUSH) 0.9 %
10 SYRINGE (ML) INJECTION AS NEEDED
Status: DISCONTINUED | OUTPATIENT
Start: 2019-11-08 | End: 2019-11-09 | Stop reason: HOSPADM

## 2019-11-08 RX ADMIN — HEPARIN 500 UNITS: 100 SYRINGE at 12:05

## 2019-11-08 RX ADMIN — Medication 10 ML: at 12:05

## 2019-11-08 RX ADMIN — MAGNESIUM SULFATE HEPTAHYDRATE 2 G: 40 INJECTION, SOLUTION INTRAVENOUS at 11:05

## 2019-11-08 NOTE — TELEPHONE ENCOUNTER
Patient is going out of town on 11/10/19 and has DNF for script 11/12/19. Can she fill on 11/9 or 11/10? Dmitriy [phone].

## 2019-11-14 ENCOUNTER — TELEPHONE (OUTPATIENT)
Dept: ONCOLOGY | Facility: CLINIC | Age: 64
End: 2019-11-14

## 2019-11-14 NOTE — TELEPHONE ENCOUNTER
Patient states she needs to reschedule her INR and mag infusion.  Patient scheduled for Monday 11-18-19. johanne Pena

## 2019-11-15 ENCOUNTER — APPOINTMENT (OUTPATIENT)
Dept: ONCOLOGY | Facility: HOSPITAL | Age: 64
End: 2019-11-15

## 2019-11-15 ENCOUNTER — APPOINTMENT (OUTPATIENT)
Dept: LAB | Facility: HOSPITAL | Age: 64
End: 2019-11-15

## 2019-11-15 ENCOUNTER — HOSPITAL ENCOUNTER (OUTPATIENT)
Dept: ONCOLOGY | Facility: HOSPITAL | Age: 64
Setting detail: INFUSION SERIES
End: 2019-11-15

## 2019-11-18 ENCOUNTER — HOSPITAL ENCOUNTER (OUTPATIENT)
Dept: ONCOLOGY | Facility: HOSPITAL | Age: 64
Setting detail: INFUSION SERIES
Discharge: HOME | End: 2019-11-18

## 2019-11-18 ENCOUNTER — LAB (OUTPATIENT)
Dept: LAB | Facility: HOSPITAL | Age: 64
End: 2019-11-18

## 2019-11-18 ENCOUNTER — HOSPITAL ENCOUNTER (OUTPATIENT)
Dept: ONCOLOGY | Facility: HOSPITAL | Age: 64
Discharge: HOME | End: 2019-11-18
Admitting: NURSE PRACTITIONER

## 2019-11-18 VITALS
SYSTOLIC BLOOD PRESSURE: 138 MMHG | HEIGHT: 65 IN | HEART RATE: 52 BPM | TEMPERATURE: 98 F | DIASTOLIC BLOOD PRESSURE: 77 MMHG | RESPIRATION RATE: 18 BRPM | BODY MASS INDEX: 30.82 KG/M2 | WEIGHT: 185 LBS

## 2019-11-18 VITALS
SYSTOLIC BLOOD PRESSURE: 138 MMHG | RESPIRATION RATE: 18 BRPM | HEART RATE: 52 BPM | BODY MASS INDEX: 30.79 KG/M2 | WEIGHT: 185 LBS | TEMPERATURE: 98 F | DIASTOLIC BLOOD PRESSURE: 77 MMHG

## 2019-11-18 DIAGNOSIS — D61.810 PANCYTOPENIA DUE TO ANTINEOPLASTIC CHEMOTHERAPY (HCC): ICD-10-CM

## 2019-11-18 DIAGNOSIS — I82.722 CHRONIC DEEP VEIN THROMBOSIS (DVT) OF LEFT UPPER EXTREMITY, UNSPECIFIED VEIN (HCC): Primary | ICD-10-CM

## 2019-11-18 DIAGNOSIS — T45.1X5A ANTINEOPLASTIC CHEMOTHERAPY INDUCED ANEMIA: ICD-10-CM

## 2019-11-18 DIAGNOSIS — D64.81 ANTINEOPLASTIC CHEMOTHERAPY INDUCED ANEMIA: ICD-10-CM

## 2019-11-18 DIAGNOSIS — I82.722 CHRONIC DEEP VEIN THROMBOSIS (DVT) OF LEFT UPPER EXTREMITY, UNSPECIFIED VEIN (HCC): ICD-10-CM

## 2019-11-18 DIAGNOSIS — T45.1X5A PANCYTOPENIA DUE TO ANTINEOPLASTIC CHEMOTHERAPY (HCC): ICD-10-CM

## 2019-11-18 DIAGNOSIS — E83.42 HYPOMAGNESEMIA: Primary | ICD-10-CM

## 2019-11-18 DIAGNOSIS — C56.1 MALIGNANT NEOPLASM OF RIGHT OVARY (HCC): ICD-10-CM

## 2019-11-18 LAB
BASOPHILS # BLD AUTO: 0.04 10*3/MM3 (ref 0–0.2)
BASOPHILS NFR BLD AUTO: 1.2 % (ref 0–1.5)
DEPRECATED RDW RBC AUTO: 66.1 FL (ref 37–54)
EOSINOPHIL # BLD AUTO: 0.11 10*3/MM3 (ref 0–0.4)
EOSINOPHIL NFR BLD AUTO: 3.3 % (ref 0.3–6.2)
ERYTHROCYTE [DISTWIDTH] IN BLOOD BY AUTOMATED COUNT: 16.8 % (ref 12.3–15.4)
HCT VFR BLD AUTO: 32.2 % (ref 34–46.6)
HGB BLD-MCNC: 10.5 G/DL (ref 12–15.9)
INR PPP: 2.2
INR PPP: 2.2 (ref 2–3)
LYMPHOCYTES # BLD AUTO: 1.21 10*3/MM3 (ref 0.7–3.1)
LYMPHOCYTES NFR BLD AUTO: 36.2 % (ref 19.6–45.3)
MAGNESIUM SERPL-MCNC: 1.5 MG/DL (ref 1.6–2.4)
MCH RBC QN AUTO: 36 PG (ref 26.6–33)
MCHC RBC AUTO-ENTMCNC: 32.6 G/DL (ref 31.5–35.7)
MCV RBC AUTO: 110.3 FL (ref 79–97)
MONOCYTES # BLD AUTO: 0.8 10*3/MM3 (ref 0.1–0.9)
MONOCYTES NFR BLD AUTO: 24 % (ref 5–12)
NEUTROPHILS # BLD AUTO: 1.18 10*3/MM3 (ref 1.7–7)
NEUTROPHILS NFR BLD AUTO: 35.3 % (ref 42.7–76)
PLATELET # BLD AUTO: 131 10*3/MM3 (ref 140–450)
PMV BLD AUTO: 11 FL (ref 6–12)
RBC # BLD AUTO: 2.92 10*6/MM3 (ref 3.77–5.28)
WBC NRBC COR # BLD: 3.34 10*3/MM3 (ref 3.4–10.8)

## 2019-11-18 PROCEDURE — 96365 THER/PROPH/DIAG IV INF INIT: CPT | Performed by: INTERNAL MEDICINE

## 2019-11-18 PROCEDURE — 83735 ASSAY OF MAGNESIUM: CPT | Performed by: NURSE PRACTITIONER

## 2019-11-18 PROCEDURE — 85610 PROTHROMBIN TIME: CPT

## 2019-11-18 PROCEDURE — 85025 COMPLETE CBC W/AUTO DIFF WBC: CPT | Performed by: NURSE PRACTITIONER

## 2019-11-18 PROCEDURE — 36416 COLLJ CAPILLARY BLOOD SPEC: CPT

## 2019-11-18 PROCEDURE — 25010000002 MAGNESIUM SULFATE 2 GM/50ML SOLUTION: Performed by: NURSE PRACTITIONER

## 2019-11-18 RX ORDER — MAGNESIUM SULFATE HEPTAHYDRATE 40 MG/ML
2 INJECTION, SOLUTION INTRAVENOUS ONCE
Status: COMPLETED | OUTPATIENT
Start: 2019-11-18 | End: 2019-11-18

## 2019-11-18 RX ORDER — SODIUM CHLORIDE 0.9 % (FLUSH) 0.9 %
10 SYRINGE (ML) INJECTION AS NEEDED
Status: CANCELLED | OUTPATIENT
Start: 2019-11-18

## 2019-11-18 RX ORDER — SODIUM CHLORIDE 0.9 % (FLUSH) 0.9 %
10 SYRINGE (ML) INJECTION AS NEEDED
Status: DISCONTINUED | OUTPATIENT
Start: 2019-11-18 | End: 2019-11-19 | Stop reason: HOSPADM

## 2019-11-18 RX ORDER — SODIUM CHLORIDE 9 MG/ML
250 INJECTION, SOLUTION INTRAVENOUS ONCE
Status: DISCONTINUED | OUTPATIENT
Start: 2019-11-18 | End: 2019-11-19 | Stop reason: HOSPADM

## 2019-11-18 RX ADMIN — HEPARIN 500 UNITS: 100 SYRINGE at 10:09

## 2019-11-18 RX ADMIN — Medication 10 ML: at 10:08

## 2019-11-18 RX ADMIN — MAGNESIUM SULFATE HEPTAHYDRATE 2 G: 40 INJECTION, SOLUTION INTRAVENOUS at 09:04

## 2019-11-22 ENCOUNTER — HOSPITAL ENCOUNTER (OUTPATIENT)
Dept: ONCOLOGY | Facility: HOSPITAL | Age: 64
Setting detail: INFUSION SERIES
Discharge: HOME | End: 2019-11-22

## 2019-11-22 VITALS
BODY MASS INDEX: 30.66 KG/M2 | SYSTOLIC BLOOD PRESSURE: 126 MMHG | TEMPERATURE: 97.6 F | HEIGHT: 65 IN | DIASTOLIC BLOOD PRESSURE: 72 MMHG | RESPIRATION RATE: 18 BRPM | HEART RATE: 62 BPM | WEIGHT: 184 LBS

## 2019-11-22 DIAGNOSIS — D64.81 ANTINEOPLASTIC CHEMOTHERAPY INDUCED ANEMIA: ICD-10-CM

## 2019-11-22 DIAGNOSIS — T45.1X5A PANCYTOPENIA DUE TO ANTINEOPLASTIC CHEMOTHERAPY (HCC): ICD-10-CM

## 2019-11-22 DIAGNOSIS — E83.42 HYPOMAGNESEMIA: ICD-10-CM

## 2019-11-22 DIAGNOSIS — D61.810 PANCYTOPENIA DUE TO ANTINEOPLASTIC CHEMOTHERAPY (HCC): ICD-10-CM

## 2019-11-22 DIAGNOSIS — T45.1X5A ANTINEOPLASTIC CHEMOTHERAPY INDUCED ANEMIA: ICD-10-CM

## 2019-11-22 DIAGNOSIS — C56.1 MALIGNANT NEOPLASM OF RIGHT OVARY (HCC): Primary | ICD-10-CM

## 2019-11-22 LAB
ALBUMIN SERPL-MCNC: 4 G/DL (ref 3.5–5.2)
ALBUMIN/GLOB SERPL: 1.7 G/DL
ALP SERPL-CCNC: 118 U/L (ref 39–117)
ALT SERPL W P-5'-P-CCNC: 10 U/L (ref 1–33)
ANION GAP SERPL CALCULATED.3IONS-SCNC: 10 MMOL/L (ref 5–15)
AST SERPL-CCNC: 21 U/L (ref 1–32)
BASOPHILS # BLD AUTO: 0.02 10*3/MM3 (ref 0–0.2)
BASOPHILS NFR BLD AUTO: 0.6 % (ref 0–1.5)
BILIRUB SERPL-MCNC: 0.2 MG/DL (ref 0.2–1.2)
BUN BLD-MCNC: 15 MG/DL (ref 8–23)
BUN/CREAT SERPL: 16.5 (ref 7–25)
CALCIUM SPEC-SCNC: 9.1 MG/DL (ref 8.6–10.5)
CHLORIDE SERPL-SCNC: 104 MMOL/L (ref 98–107)
CO2 SERPL-SCNC: 27 MMOL/L (ref 22–29)
CREAT BLD-MCNC: 0.91 MG/DL (ref 0.57–1)
CREAT BLDA-MCNC: 0.9 MG/DL (ref 0.6–1.3)
DEPRECATED RDW RBC AUTO: 62.6 FL (ref 37–54)
EOSINOPHIL # BLD AUTO: 0.09 10*3/MM3 (ref 0–0.4)
EOSINOPHIL NFR BLD AUTO: 2.6 % (ref 0.3–6.2)
ERYTHROCYTE [DISTWIDTH] IN BLOOD BY AUTOMATED COUNT: 15.9 % (ref 12.3–15.4)
GFR SERPL CREATININE-BSD FRML MDRD: 62 ML/MIN/1.73
GLOBULIN UR ELPH-MCNC: 2.4 GM/DL
GLUCOSE BLD-MCNC: 98 MG/DL (ref 65–99)
HCT VFR BLD AUTO: 32.6 % (ref 34–46.6)
HGB BLD-MCNC: 10.9 G/DL (ref 12–15.9)
LYMPHOCYTES # BLD AUTO: 1.17 10*3/MM3 (ref 0.7–3.1)
LYMPHOCYTES NFR BLD AUTO: 33.5 % (ref 19.6–45.3)
MAGNESIUM SERPL-MCNC: 1.5 MG/DL (ref 1.6–2.4)
MCH RBC QN AUTO: 36 PG (ref 26.6–33)
MCHC RBC AUTO-ENTMCNC: 33.4 G/DL (ref 31.5–35.7)
MCV RBC AUTO: 107.6 FL (ref 79–97)
MONOCYTES # BLD AUTO: 0.65 10*3/MM3 (ref 0.1–0.9)
MONOCYTES NFR BLD AUTO: 18.6 % (ref 5–12)
NEUTROPHILS # BLD AUTO: 1.56 10*3/MM3 (ref 1.7–7)
NEUTROPHILS NFR BLD AUTO: 44.7 % (ref 42.7–76)
PLATELET # BLD AUTO: 160 10*3/MM3 (ref 140–450)
PMV BLD AUTO: 9.8 FL (ref 6–12)
POTASSIUM BLD-SCNC: 3.8 MMOL/L (ref 3.5–5.2)
PROT SERPL-MCNC: 6.4 G/DL (ref 6–8.5)
RBC # BLD AUTO: 3.03 10*6/MM3 (ref 3.77–5.28)
SODIUM BLD-SCNC: 141 MMOL/L (ref 136–145)
WBC NRBC COR # BLD: 3.49 10*3/MM3 (ref 3.4–10.8)

## 2019-11-22 PROCEDURE — 96377 APPLICATON ON-BODY INJECTOR: CPT | Performed by: INTERNAL MEDICINE

## 2019-11-22 PROCEDURE — 25010000002 FOSAPREPITANT PER 1 MG: Performed by: INTERNAL MEDICINE

## 2019-11-22 PROCEDURE — 83735 ASSAY OF MAGNESIUM: CPT | Performed by: NURSE PRACTITIONER

## 2019-11-22 PROCEDURE — 85025 COMPLETE CBC W/AUTO DIFF WBC: CPT | Performed by: INTERNAL MEDICINE

## 2019-11-22 PROCEDURE — 25010000002 CARBOPLATIN PER 50 MG: Performed by: INTERNAL MEDICINE

## 2019-11-22 PROCEDURE — 96367 TX/PROPH/DG ADDL SEQ IV INF: CPT | Performed by: INTERNAL MEDICINE

## 2019-11-22 PROCEDURE — 82565 ASSAY OF CREATININE: CPT

## 2019-11-22 PROCEDURE — 25010000002 DEXAMETHASONE SODIUM PHOSPHATE 120 MG/30ML SOLUTION: Performed by: INTERNAL MEDICINE

## 2019-11-22 PROCEDURE — 80053 COMPREHEN METABOLIC PANEL: CPT | Performed by: INTERNAL MEDICINE

## 2019-11-22 PROCEDURE — 96417 CHEMO IV INFUS EACH ADDL SEQ: CPT | Performed by: INTERNAL MEDICINE

## 2019-11-22 PROCEDURE — 25010000002 PALONOSETRON 0.25 MG/5ML SOLUTION PREFILLED SYRINGE: Performed by: INTERNAL MEDICINE

## 2019-11-22 PROCEDURE — 25010000002 DOXORUBICIN LIPOSOMAL PER 10 MG: Performed by: INTERNAL MEDICINE

## 2019-11-22 PROCEDURE — 96413 CHEMO IV INFUSION 1 HR: CPT | Performed by: INTERNAL MEDICINE

## 2019-11-22 PROCEDURE — 25010000002 PEGFILGRASTIM 6 MG/0.6ML PREFILLED SYRINGE KIT: Performed by: INTERNAL MEDICINE

## 2019-11-22 PROCEDURE — 96375 TX/PRO/DX INJ NEW DRUG ADDON: CPT | Performed by: INTERNAL MEDICINE

## 2019-11-22 RX ORDER — PALONOSETRON HYDROCHLORIDE 0.05 MG/ML
0.25 INJECTION, SOLUTION INTRAVENOUS ONCE
Status: COMPLETED | OUTPATIENT
Start: 2019-11-22 | End: 2019-11-22

## 2019-11-22 RX ORDER — DEXTROSE MONOHYDRATE 50 MG/ML
250 INJECTION, SOLUTION INTRAVENOUS ONCE
Status: COMPLETED | OUTPATIENT
Start: 2019-11-22 | End: 2019-11-22

## 2019-11-22 RX ADMIN — CARBOPLATIN 540 MG: 10 INJECTION, SOLUTION INTRAVENOUS at 11:21

## 2019-11-22 RX ADMIN — PEGFILGRASTIM 6 MG: KIT SUBCUTANEOUS at 12:04

## 2019-11-22 RX ADMIN — SODIUM CHLORIDE 100 ML: 900 INJECTION, SOLUTION INTRAVENOUS at 09:23

## 2019-11-22 RX ADMIN — DOXORUBICIN HYDROCHLORIDE 56 MG: 2 INJECTABLE, LIPOSOMAL INTRAVENOUS at 10:14

## 2019-11-22 RX ADMIN — PALONOSETRON 0.25 MG: 0.25 INJECTION, SOLUTION INTRAVENOUS at 09:21

## 2019-11-22 RX ADMIN — DEXTROSE MONOHYDRATE 250 ML: 5 INJECTION, SOLUTION INTRAVENOUS at 09:23

## 2019-11-22 RX ADMIN — DEXAMETHASONE SODIUM PHOSPHATE 8 MG: 4 INJECTION, SOLUTION INTRA-ARTICULAR; INTRALESIONAL; INTRAMUSCULAR; INTRAVENOUS; SOFT TISSUE at 09:55

## 2019-11-27 ENCOUNTER — HOSPITAL ENCOUNTER (OUTPATIENT)
Dept: ONCOLOGY | Facility: HOSPITAL | Age: 64
Setting detail: INFUSION SERIES
Discharge: HOME | End: 2019-11-27

## 2019-11-27 ENCOUNTER — LAB (OUTPATIENT)
Dept: LAB | Facility: HOSPITAL | Age: 64
End: 2019-11-27

## 2019-11-27 ENCOUNTER — HOSPITAL ENCOUNTER (OUTPATIENT)
Dept: ONCOLOGY | Facility: HOSPITAL | Age: 64
Discharge: HOME | End: 2019-11-27
Admitting: NURSE PRACTITIONER

## 2019-11-27 VITALS
SYSTOLIC BLOOD PRESSURE: 124 MMHG | RESPIRATION RATE: 18 BRPM | HEART RATE: 60 BPM | BODY MASS INDEX: 30.49 KG/M2 | HEIGHT: 65 IN | WEIGHT: 183 LBS | DIASTOLIC BLOOD PRESSURE: 71 MMHG | TEMPERATURE: 97.9 F

## 2019-11-27 VITALS — WEIGHT: 183 LBS | RESPIRATION RATE: 18 BRPM | HEIGHT: 65 IN | BODY MASS INDEX: 30.49 KG/M2

## 2019-11-27 DIAGNOSIS — E83.42 HYPOMAGNESEMIA: Primary | ICD-10-CM

## 2019-11-27 DIAGNOSIS — E83.42 HYPOMAGNESEMIA: ICD-10-CM

## 2019-11-27 DIAGNOSIS — I82.722 CHRONIC DEEP VEIN THROMBOSIS (DVT) OF LEFT UPPER EXTREMITY, UNSPECIFIED VEIN (HCC): Primary | ICD-10-CM

## 2019-11-27 DIAGNOSIS — C56.1 MALIGNANT NEOPLASM OF RIGHT OVARY (HCC): ICD-10-CM

## 2019-11-27 DIAGNOSIS — I82.722 CHRONIC DEEP VEIN THROMBOSIS (DVT) OF LEFT UPPER EXTREMITY, UNSPECIFIED VEIN (HCC): ICD-10-CM

## 2019-11-27 LAB
BASOPHILS # BLD AUTO: 0.02 10*3/MM3 (ref 0–0.2)
BASOPHILS NFR BLD AUTO: 0.1 % (ref 0–1.5)
DEPRECATED RDW RBC AUTO: 60.2 FL (ref 37–54)
EOSINOPHIL # BLD AUTO: 0.11 10*3/MM3 (ref 0–0.4)
EOSINOPHIL NFR BLD AUTO: 0.6 % (ref 0.3–6.2)
ERYTHROCYTE [DISTWIDTH] IN BLOOD BY AUTOMATED COUNT: 15.6 % (ref 12.3–15.4)
HCT VFR BLD AUTO: 33.8 % (ref 34–46.6)
HGB BLD-MCNC: 11.2 G/DL (ref 12–15.9)
INR PPP: 3.5
LYMPHOCYTES # BLD AUTO: 1.4 10*3/MM3 (ref 0.7–3.1)
LYMPHOCYTES NFR BLD AUTO: 8.2 % (ref 19.6–45.3)
MCH RBC QN AUTO: 36.2 PG (ref 26.6–33)
MCHC RBC AUTO-ENTMCNC: 33.1 G/DL (ref 31.5–35.7)
MCV RBC AUTO: 109.4 FL (ref 79–97)
MONOCYTES # BLD AUTO: 1.55 10*3/MM3 (ref 0.1–0.9)
MONOCYTES NFR BLD AUTO: 9.1 % (ref 5–12)
NEUTROPHILS # BLD AUTO: 14 10*3/MM3 (ref 1.7–7)
NEUTROPHILS NFR BLD AUTO: 82 % (ref 42.7–76)
PLATELET # BLD AUTO: 116 10*3/MM3 (ref 140–450)
PMV BLD AUTO: 11.1 FL (ref 6–12)
RBC # BLD AUTO: 3.09 10*6/MM3 (ref 3.77–5.28)
WBC NRBC COR # BLD: 17.08 10*3/MM3 (ref 3.4–10.8)

## 2019-11-27 PROCEDURE — 85610 PROTHROMBIN TIME: CPT

## 2019-11-27 PROCEDURE — 25010000002 MAGNESIUM SULFATE 2 GM/50ML SOLUTION: Performed by: NURSE PRACTITIONER

## 2019-11-27 PROCEDURE — 85025 COMPLETE CBC W/AUTO DIFF WBC: CPT | Performed by: NURSE PRACTITIONER

## 2019-11-27 PROCEDURE — 96365 THER/PROPH/DIAG IV INF INIT: CPT | Performed by: INTERNAL MEDICINE

## 2019-11-27 RX ORDER — SODIUM CHLORIDE 0.9 % (FLUSH) 0.9 %
10 SYRINGE (ML) INJECTION AS NEEDED
Status: CANCELLED | OUTPATIENT
Start: 2019-11-27

## 2019-11-27 RX ORDER — SODIUM CHLORIDE 9 MG/ML
250 INJECTION, SOLUTION INTRAVENOUS ONCE
Status: DISCONTINUED | OUTPATIENT
Start: 2019-11-27 | End: 2019-11-28 | Stop reason: HOSPADM

## 2019-11-27 RX ORDER — MAGNESIUM SULFATE HEPTAHYDRATE 40 MG/ML
2 INJECTION, SOLUTION INTRAVENOUS ONCE
Status: COMPLETED | OUTPATIENT
Start: 2019-11-27 | End: 2019-11-27

## 2019-11-27 RX ORDER — SODIUM CHLORIDE 0.9 % (FLUSH) 0.9 %
10 SYRINGE (ML) INJECTION AS NEEDED
Status: DISCONTINUED | OUTPATIENT
Start: 2019-11-27 | End: 2019-11-28 | Stop reason: HOSPADM

## 2019-11-27 RX ORDER — MAGNESIUM OXIDE 400 MG/1
TABLET ORAL
Qty: 180 TABLET | Refills: 1 | Status: SHIPPED | OUTPATIENT
Start: 2019-11-27 | End: 2019-12-08

## 2019-11-27 RX ADMIN — MAGNESIUM SULFATE HEPTAHYDRATE 2 G: 40 INJECTION, SOLUTION INTRAVENOUS at 10:22

## 2019-11-27 RX ADMIN — Medication 10 ML: at 11:26

## 2019-11-27 RX ADMIN — HEPARIN 500 UNITS: 100 SYRINGE at 11:26

## 2019-12-04 DIAGNOSIS — E83.42 HYPOMAGNESEMIA: ICD-10-CM

## 2019-12-04 RX ORDER — SODIUM CHLORIDE 9 MG/ML
250 INJECTION, SOLUTION INTRAVENOUS ONCE
Status: CANCELLED | OUTPATIENT
Start: 2019-12-06

## 2019-12-04 RX ORDER — MAGNESIUM SULFATE HEPTAHYDRATE 40 MG/ML
2 INJECTION, SOLUTION INTRAVENOUS ONCE
Status: CANCELLED | OUTPATIENT
Start: 2019-12-06

## 2019-12-06 ENCOUNTER — HOSPITAL ENCOUNTER (OUTPATIENT)
Dept: ONCOLOGY | Facility: HOSPITAL | Age: 64
Setting detail: INFUSION SERIES
Discharge: HOME | End: 2019-12-06

## 2019-12-06 ENCOUNTER — OFFICE VISIT (OUTPATIENT)
Dept: ONCOLOGY | Facility: CLINIC | Age: 64
End: 2019-12-06

## 2019-12-06 ENCOUNTER — LAB (OUTPATIENT)
Dept: LAB | Facility: HOSPITAL | Age: 64
End: 2019-12-06

## 2019-12-06 ENCOUNTER — TELEPHONE (OUTPATIENT)
Dept: PAIN MEDICINE | Facility: CLINIC | Age: 64
End: 2019-12-06

## 2019-12-06 ENCOUNTER — HOSPITAL ENCOUNTER (OUTPATIENT)
Dept: ONCOLOGY | Facility: HOSPITAL | Age: 64
Discharge: HOME | End: 2019-12-06
Admitting: NURSE PRACTITIONER

## 2019-12-06 ENCOUNTER — APPOINTMENT (OUTPATIENT)
Dept: ONCOLOGY | Facility: CLINIC | Age: 64
End: 2019-12-06

## 2019-12-06 ENCOUNTER — APPOINTMENT (OUTPATIENT)
Dept: LAB | Facility: HOSPITAL | Age: 64
End: 2019-12-06

## 2019-12-06 VITALS
BODY MASS INDEX: 30.66 KG/M2 | TEMPERATURE: 98.4 F | HEIGHT: 65 IN | WEIGHT: 184 LBS | HEART RATE: 53 BPM | SYSTOLIC BLOOD PRESSURE: 115 MMHG | DIASTOLIC BLOOD PRESSURE: 72 MMHG | RESPIRATION RATE: 18 BRPM

## 2019-12-06 VITALS — HEIGHT: 65 IN | WEIGHT: 184 LBS | BODY MASS INDEX: 30.66 KG/M2 | RESPIRATION RATE: 18 BRPM

## 2019-12-06 DIAGNOSIS — I82.722 CHRONIC DEEP VEIN THROMBOSIS (DVT) OF LEFT UPPER EXTREMITY, UNSPECIFIED VEIN (HCC): Primary | ICD-10-CM

## 2019-12-06 DIAGNOSIS — I82.722 CHRONIC DEEP VEIN THROMBOSIS (DVT) OF LEFT UPPER EXTREMITY, UNSPECIFIED VEIN (HCC): ICD-10-CM

## 2019-12-06 DIAGNOSIS — C56.1 MALIGNANT NEOPLASM OF RIGHT OVARY (HCC): ICD-10-CM

## 2019-12-06 DIAGNOSIS — T45.1X5A ANTINEOPLASTIC CHEMOTHERAPY INDUCED ANEMIA: Primary | ICD-10-CM

## 2019-12-06 DIAGNOSIS — C56.1 MALIGNANT NEOPLASM OF RIGHT OVARY (HCC): Primary | ICD-10-CM

## 2019-12-06 DIAGNOSIS — T45.1X5A PANCYTOPENIA DUE TO ANTINEOPLASTIC CHEMOTHERAPY (HCC): ICD-10-CM

## 2019-12-06 DIAGNOSIS — D61.810 PANCYTOPENIA DUE TO ANTINEOPLASTIC CHEMOTHERAPY (HCC): ICD-10-CM

## 2019-12-06 DIAGNOSIS — E83.42 HYPOMAGNESEMIA: ICD-10-CM

## 2019-12-06 DIAGNOSIS — D64.81 ANTINEOPLASTIC CHEMOTHERAPY INDUCED ANEMIA: Primary | ICD-10-CM

## 2019-12-06 LAB
BASOPHILS # BLD AUTO: 0.01 10*3/MM3 (ref 0–0.2)
BASOPHILS NFR BLD AUTO: 0.2 % (ref 0–1.5)
DEPRECATED RDW RBC AUTO: 56.4 FL (ref 37–54)
EOSINOPHIL # BLD AUTO: 0.15 10*3/MM3 (ref 0–0.4)
EOSINOPHIL NFR BLD AUTO: 3.7 % (ref 0.3–6.2)
ERYTHROCYTE [DISTWIDTH] IN BLOOD BY AUTOMATED COUNT: 14.8 % (ref 12.3–15.4)
HCT VFR BLD AUTO: 28.6 % (ref 34–46.6)
HGB BLD-MCNC: 9.3 G/DL (ref 12–15.9)
INR PPP: 2
LYMPHOCYTES # BLD AUTO: 1.16 10*3/MM3 (ref 0.7–3.1)
LYMPHOCYTES NFR BLD AUTO: 28.9 % (ref 19.6–45.3)
MAGNESIUM SERPL-MCNC: 1.3 MG/DL (ref 1.6–2.4)
MCH RBC QN AUTO: 35.5 PG (ref 26.6–33)
MCHC RBC AUTO-ENTMCNC: 32.5 G/DL (ref 31.5–35.7)
MCV RBC AUTO: 109.2 FL (ref 79–97)
MONOCYTES # BLD AUTO: 0.32 10*3/MM3 (ref 0.1–0.9)
MONOCYTES NFR BLD AUTO: 8 % (ref 5–12)
NEUTROPHILS # BLD AUTO: 2.38 10*3/MM3 (ref 1.7–7)
NEUTROPHILS NFR BLD AUTO: 59.2 % (ref 42.7–76)
PLATELET # BLD AUTO: 61 10*3/MM3 (ref 140–450)
PMV BLD AUTO: 11.4 FL (ref 6–12)
RBC # BLD AUTO: 2.62 10*6/MM3 (ref 3.77–5.28)
WBC NRBC COR # BLD: 4.02 10*3/MM3 (ref 3.4–10.8)

## 2019-12-06 PROCEDURE — 83735 ASSAY OF MAGNESIUM: CPT | Performed by: NURSE PRACTITIONER

## 2019-12-06 PROCEDURE — G0463 HOSPITAL OUTPT CLINIC VISIT: HCPCS | Performed by: INTERNAL MEDICINE

## 2019-12-06 PROCEDURE — 99215 OFFICE O/P EST HI 40 MIN: CPT | Performed by: INTERNAL MEDICINE

## 2019-12-06 PROCEDURE — 85610 PROTHROMBIN TIME: CPT

## 2019-12-06 PROCEDURE — 25010000002 MAGNESIUM SULFATE 2 GM/50ML SOLUTION: Performed by: NURSE PRACTITIONER

## 2019-12-06 PROCEDURE — 96365 THER/PROPH/DIAG IV INF INIT: CPT | Performed by: NURSE PRACTITIONER

## 2019-12-06 PROCEDURE — 85025 COMPLETE CBC W/AUTO DIFF WBC: CPT | Performed by: NURSE PRACTITIONER

## 2019-12-06 RX ORDER — SODIUM CHLORIDE 9 MG/ML
250 INJECTION, SOLUTION INTRAVENOUS ONCE
Status: DISCONTINUED | OUTPATIENT
Start: 2019-12-06 | End: 2019-12-07 | Stop reason: HOSPADM

## 2019-12-06 RX ORDER — MAGNESIUM SULFATE HEPTAHYDRATE 40 MG/ML
2 INJECTION, SOLUTION INTRAVENOUS ONCE
Status: COMPLETED | OUTPATIENT
Start: 2019-12-06 | End: 2019-12-06

## 2019-12-06 RX ORDER — SODIUM CHLORIDE 0.9 % (FLUSH) 0.9 %
10 SYRINGE (ML) INJECTION AS NEEDED
Status: CANCELLED | OUTPATIENT
Start: 2019-12-06

## 2019-12-06 RX ORDER — SODIUM CHLORIDE 0.9 % (FLUSH) 0.9 %
10 SYRINGE (ML) INJECTION AS NEEDED
Status: DISCONTINUED | OUTPATIENT
Start: 2019-12-06 | End: 2019-12-07 | Stop reason: HOSPADM

## 2019-12-06 RX ADMIN — MAGNESIUM SULFATE HEPTAHYDRATE 2 G: 40 INJECTION, SOLUTION INTRAVENOUS at 11:11

## 2019-12-06 RX ADMIN — Medication 10 ML: at 12:12

## 2019-12-06 RX ADMIN — HEPARIN 500 UNITS: 100 SYRINGE at 12:12

## 2019-12-06 NOTE — PROGRESS NOTES
Hematology/Oncology Outpatient Follow Up    PATIENT NAME:Tahira Zimmerman  :1955  MRN: [id number]  PRIMARY CARE PHYSICIAN: Noemy Queen MD  REFERRING PHYSICIAN: Noemy Queen MD    Chief Complaint   Patient presents with   • Follow-up     Ovarian cancer   • Follow-up     Monitor drug toxicity        HISTORY OF PRESENT ILLNESS:   1. Recurrent ovarian cancer diagnosis established in 2013.  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  The patient was treated with surgery and then postoperatively with adjuvant chemotherapy consisting of Carboplatin and Taxol.  Six months later, she had recurrence of disease intra-abdominally between the rectum and vagina, which was treated with intraabdominal peritoneal chemotherapy.  She had been free of disease since that time.  She reported feeling a mass in the left side of her abdomen approximately six months ago.  This gradually grew and in early 2013 she had her daughter feel the mass.  The daughter works for Dr. David Rogers and the patient at that time also complained of intermittent rectal bleeding.  Consultation with Dr. David Rogers was obtained.  The patient had a CT scan of the abdomen and pelvis performed on 13 revealing left lower quadrant mass measuring 9.7 x 9.3 x 6 cm demonstrating areas of dense calcification, soft tissue density and cystic density within it.  Stromal tumor is felt to be most likely a possibly fibroma.  It is generated with necrosis and calcification.  Possible palpable mass in the rectus muscle is seen with calcification and deformity of the rectus muscle and just below this a second mass intra-abdominally was seen measuring 2 cm in the greatest diameter suspicious for second intraabdominal tumor.  The mass separate from the largest mass measuring 2.6 cm in the dependent portion of pelvis is seen on the right of the  midline.  No retroperitoneal lymphadenopathy was seen.  No free air, free fluid or other abnormality was present.  The patient was scheduled for an outpatient colonoscopy, but had syncopal episode while sitting in the toilet the night before and was brought to the emergency room early in the morning by her daughter and son-in-law.  The patient had apparently stopped breathing for a few seconds and did not have a pulse, but started breathing before CPR could be initiated.  In the emergency room, the patient had an EKG revealing sinus rhythm nonspecific T-wave flattening; metabolic panel revealed a glucose of 148, creatinine of 1.3, CBC was normal.  She was treated with intravenous fluid.  The patient’s case was then discussed with Dr. Anabell Monique and patient was subsequently admitted to [facility].  The patient underwent a colonoscopy by Dr. David Rogers on 06/27/13 revealing sigmoid diverticulosis and grade II internal and external hemorrhoids, but no mass or colitis was seen.  CT-guided biopsy of the mass was performed on 06/27/13 as well revealing metastatic papillary adenocarcinoma with numerous psammoma bodies.  Pathology comment stated prior records were not available; however, with history of ovarian cancer the current biopsy would be consistent with metastases from that malignancy.  Oncology consult was obtained and I initially saw the patient on 06/27/2013 where the patient had claimed to have little bit of pain in the area of disease.  She reported being frequently constipated, denies any weight loss or fevers.  She did report having some night sweats, but thought that was because of her menopause.  On 06/27/13 metastatic workup including labs and CT scans were initiated.  CT scan of the chest on 06/27/13 revealed no acute disease in the chest.  A 1.4 cm size focal area of decreased density was noted in the lateral aspect of the right lobe of the liver consistent with a benign cyst or  hemangioma.  There was a very small hiatal hernia measuring 2.2 cm in diameter.  A CT scan of the head performed 06/27/13 revealed no acute intracranial abnormality noted.  CA-125 was 40 units/ml (0-35), CA 19-9 was 11.8 units/ml (0-35), CEA level was 0.8 ng/ml (0-3), TSH level was 1.09 IU/ml (0.34-5.6).  The patient was in workup for the syncopal episode, a carotid Doppler was performed on June 28 revealing an essentially normal study showing a reduction in diameter from 0-15% bilaterally.  A Holter monitor was completed on 06/27/13, which was unremarkable except for a few premature atrial contractions.  The patient was felt stable and subsequently was discharged home on 06/28/13.  Post discharge, the patient was seen at [facility] on 07/05/13 by Dr. Kathryn Thrasher who discussed treatment options with the patient including surgery.  The patient is in the office today 07/16/13 in follow up post hospitalization.  She is reporting that surgery is planned for July 30th of this month at .  · 7/30/13 to 8/3/13 - The patient was in [facility].  The patient was admitted for surgery for her recurrent ovarian cancer.  The patient had exploratory laparotomy, omentectomy, bowel resection, liver biopsy and appendectomy.  Pathology revealed of the omentum metastatic serous carcinoma of the transverse colon, segmental resection showed metastatic serous carcinoma involving mesenteric fat.  The liver wedge resection showed fibrosclerotic thickening of the hepatic capsule.  Benign liver parenchyma.  No evidence of metastatic tumor.  Appendix showed fibrous obliteration of the lumen.  Negative for metastatic carcinoma.  Rectum and sigmoid colon segmental resection showed metastatic serous carcinoma invading the colorectal mucosa uninvolved by tumor.  Vaginal mass showed metastatic serous carcinoma.  Margins are positive for tumor.  · 9/24/13 - Chemotherapy orders were written for  patient to start carboplatin 550 mg IV day one and Taxol 330 mg IV day one to be cycled every three weeks.  · 10/10/13 - The patient started cycle #1 of chemotherapy consisting of Carboplatin and Taxol.  · 09/25/13 -  is 10.6 normal.  · 10/01/13 - CT of the chest, abdomen and pelvis revealed new reticular nodular occupancy in the anterior segment of the right upper lobe, could reflect an inflammation or infectious etiology or could reflect unusual persistence of metastatic tumor.  New liver lesions, the smaller one appears to be implant on the surface of the liver, the larger may also represent an implant including the intrahepatic.  Several small mesenteric nodes seen throughout the abdomen and pelvis.  · 10/02/13 - Port placed by Dr. Sen.  · 10/24/13 - Orders written to start Neupogen daily and if more than three Neupogen are needed to give Neulasta after next chemo.  ANC is 0.15.  · 10/31/14 - Patient given cycle 2 of chemotherapy with Taxol and Carboplatin.  · 11/21/13 - The patient started cycle 3 of chemotherapy consisting of Carboplatin and Taxol.  · 11/26/13 - CT scan of chest, abdomen and pelvis revealed no evidence of active disease in the chest.  Reticulonodular opacity has resolved.  Metastatic lesion impressing upon the hepatic dome similar to prior exam.  No evidence of a change.  No evidence of new disease.  Postsurgical changes in the pelvis and throughout the retroperitoneum in the large bowel.  Finding containing anterior abdominal wall hernia containing fat tissue and enhancing vessels, 3 cm in diameter.  · 12/2/13 - Orders written to start Neupogen per protocol as well as Aranesp due to low hemoglobin and ANC.  ANC is 0.14.  Hemoglobin is 9.7.  · 12/11/13 - Orders written to hold chemotherapy until next week and decrease dose by 20% due to low platelet count.  Platelet count is 85,000.  · 12/16/13 - The patient received cycle 4 of Carboplatin and Taxol at a 20% dose reduction so Taxol dose  is 260 mg and Carboplatin is 440 mg.  · 12/16/13 - CA-125 12.6 (N).  · 12/19/13 - PET/CT scan.  Impression:  1. There is no evidence of hypermetabolism in the liver.  Specifically of the dominant lesion in or adjacent to the right hepatic dome that was seen and described on the recent CT study of 11/26/2013.  It is photopenic relative to the surrounding liver.  2.  There is no evidence of hypermetabolic soft issue mass lesion or free fluid in the abdomen or pelvis.  3.  The tonsillar tissues are slightly enlarged and have moderate activity level.  This maybe physiologic.  Correlation with oral cavity, examination is recommended.  4.  Mild amount of activity in the right level II jugular lymph chain without focally enlarged lymph nodes is of questionable significance.  · 1/6/13 - The patient received cycle 5 of chemotherapy with Taxol and Carboplatin.  · 1/27/14 - The patient started on cycle 6 of Taxol and Carboplatin.  · 2/18/14 - CT of the abdomen and pelvis with contrast; stable lesions impressing upon the hepatic dome, unchanged compared to the prior exam.  Multiple anterior abdominal wall hernias re-demonstrated.  Those above the umbilicus contain omental fat.  Below and to the left of the umbilicus as anterior abdominal hernia containing omental fat in nondilated small bowel which is larger than seen previously.  · 2/20/14 - The patient received cycle 7 of chemotherapy with Taxol and Carboplatin.   · 3/11/14 - Order written to discontinue chemotherapy due to patient has completed 7 cycles of chemotherapy and CT scans suggest possible remission.  · 3/11/14 -  11.0 (N).  · 06/05/14 - CT scan of the chest abdomen and pelvis with contrast: There are multiple anterior abdominal wall hernias.  There are 2 larger anterior abdominal wall hernias at the level of the transverse colon, which contain omental fat.  There are at least 2 small anterior abdominal wall hernias that contain omental fat.  There is a  moderate sized anterior abdominal wall hernia at the level of the umbilicus, eccentric to the left, which contain non-dilated bowel.  Interval decrease in the size of two deposit impressing on the liver.  The third tiny deposit has been stable.  · 8/19/14 -  10.4 (N).  · 12/19/14 -   10.0 (N)  · 1/7/15 - CT chest, abdomen and pelvis with stable appearance of the chest with several micronodules. Small hiatal hernia, slightly larger than previous exam, increased from 1.5 to 2.5 cm in diameter. Bochdalek hernia unchanged. Multiple hepatic lesions. The two dominant lesions at the right hepatic dome had decreased in size from previous. The remaining tiny nodules measured 3-5 mm in diameter and were unchanged. There was no evidence of new intra-abdominal or pelvic mass or free fluid. There were multiple anterior abdominal wall hernias which had increased in size.   · 7/27/15 - Comprehensive metabolic panel with no significant abnormalities. CA-125 of 21 (0-35).   · 10/26/15 - WBC 6, hemoglobin 13, platelet count 204,000. Heart rate 47. CA-125 of 20 (0-35).    · 2/18/16 - CT chest with contrast with stable micronodular density seen in the lungs without significant change from prior exam. CT abdomen and pelvis with regression of liver lesions with no new evidence of metastasis. Multiple ventral hernias again noted without significant change from prior exam. Creatinine 0.9 (0.6-1.3).    · 2/25/16 - Patient hospitalized at [facility] between 2/25/16 and 2/27/16 with pyelonephritis secondary to E-coli. She was given two days of IV Rocephin and then placed on oral Levaquin. She was asked to hold her Coumadin, but then had nausea and vomiting because of Levaquin and stopped the Levaquin also. Her CA-125 was 32 (0-35) and CEA 1.3 (0-5). Her urine grew >100,000 E-coli. CT of the abdomen and pelvis was done which revealed 4.1 x 1.8 cm sized mild ovoid area of decreased density in the liver parenchyma along  the anteromedial aspect of the dome of the right lobe of the liver, unchanged significantly since the previous study of 2/18/16. There was suspicion of a very small pericardial effusion. There was suspicion of a small hiatal hernia. There were several hernias in the anterior abdominal wall. A 3.5 x 3.1 cm incised well-circumscribed abnormal density in the left retroperitoneal fatty soft tissue at the level of the left iliac crest was suggestive of tumor recurrence. There was an abnormal density in the left retroperitoneal soft tissues in the left flank that partially surrounded the left kidney and extended downward to the left pelvic area. Diverticulosis of the distal descending colon and sigmoid colon were seen.     · 3/8/16 - Patient prescribed Bactrim DS 1 p.o. b.i.d. for 10 days for pyelonephritis, which was partially treated with Rocephin and Levaquin. Urine with 40,000 E-coli treated with Macrobid for 7 days.  of 20 (0-35).    · 3/31/16 - PET scan with area of low density anteriorly in the right lobe of the liver with no significant radiopharmaceutical uptake to suggest malignancy. Multiple round soft tissue density masses showing increased radiopharmaceutical activity and multiple anterior abdominal wall hernias.   · 5/10/16 - Patient complains of venous enlargement of the left chest wall around the port. Has not been seen by  yet, but has an appointment on 5/23/16. Complained of a cough.   · 5/12/16 - Infusaport contrast study with no evidence of fibrin sheath. Chest x-ray with mild cardiac prominence.   · 5/23/16 - Patient seen in consultation by Sheng Bowman M.D. at [facility] and a chemotherapy trial recommended.   · 6/1/16 -   of 27.1 (0-35).    · 6/14/16 - WBC 5.71, hemoglobin 12.4, platelet count 188,000. Patient is scheduled for surgery at  on 6/16/16 after further discussion with  physicians. Discussed adjuvant chemotherapy.   · 6/16/16 - Excisional biopsy of abdominal  wall mass performed by Sheng Bowman M.D. at [facility] with pathology revealing metastatic high-grade papillary serous carcinoma.   · 7/7/16 - Received a call from the patient that Tramadol was not working and that she was given Norco #24 after her biopsy at  which did help her pain. Patient prescribed Norco 5/325 one p.o. q. 4-6 hours p.r.n. #60 with no refills. Echocardiogram with left ventricular inferior wall hypokinesis with ejection fraction of 50-55%.   · 7/8/16 - Patient seen in consultation by Sheng Bowman M.D. in consultation at  for metastatic high-grade papillary serous carcinoma diagnosed by excisional biopsy on 6/16/16 with carcinomatosis and patient not a surgical resection candidate. Genetic sequencing and susceptibility testing of tumor was ordered. Biopsy was done of anterior abdominal wall.   · 7/7/16 - Echocardiogram with left atrial enlargement. Mild mitral regurgitation. Left ventricular proximal inferior wall hypokinesis with ejection fraction of 50-55%.   · 7/11/16 - While waiting for genomics results, in order not to delay treatment further, order written for Taxotere 60 mg/M2 IV over one hour followed by Carboplatin AUC 5 over one hour, cycles to be repeated every three weeks.  Comprehensive metabolic panel normal.  26 (0-35).    · 725/16 - Pain contract signed for use of Norco.   · 8/5/16 - Patient stated that she experienced a red rash and was itchy post taking Norco. Norco discontinued and Tramadol refilled.   · 8/16/16 - Patient started cycle 1 chemotherapy. WBC 7.1, hemoglobin 11.2, MCV 88.7, platelet count 94,000. Patient complained of nausea for a week post chemo. Already getting Emend, Aloxi and Decadron. Added Omeprazole 40 mg daily orally.   · 8/17/16 - Urine culture with >100,000 E-coli.   · 8/25/16 - Cycle 2 chemotherapy given.   · 9/9/16 - Magnesium 1.3, replaced intravenously. Potassium 3.4 (3.6-5.1), increased oral potassium supplementation.     · 9/16/16 - Cycle 3 chemotherapy given.   · 9/19/16 - Comprehensive metabolic panel with no significant abnormality.   · 9/20/16 - CT abdomen and pelvis with evidence of progressive metastatic disease in the abdomen and pelvis with interval enlargement of several soft tissue attenuations and subcutaneous nodules in the anterior abdominal wall. Interval enlargement of a left pelvic soft tissue attenuation mass. A lentiform area of low attenuation in the dome of the liver stable in size. Multiple anterior abdominal wall hernias containing fat and/or bowel. Marked pelvic floor laxity. CT chest negative for evidence of metastatic disease. Tiny pulmonary nodules in the right lung stable since 2013 consistent with a benign etiology.   · 9/23/16 - Macrobid 100 mg p.o. b.i.d. x7 days prescribed for UTI. Current chemotherapy discontinued after discussion and new chemotherapy orders written. Carboplatin AUC-5 IV day 1 and Doxil (Liposomal Doxorubicin) 30 mg/M2 IV day 1 to cycle q. 21 days.  of 18 (0-35). Magnesium 1.6, replaced intravenously. Comprehensive metabolic panel with potassium 3.4 (3.6-5.1).     · 10/13/16 - Patient started on cycle 1 of Carboplatin and Liposomal Doxorubicin followed by Neulasta.   · 11/3/16 - Cycle 2 Carbo and Doxil given.   · 11/17/16 - Magnesium 1.0, replaced intravenously. Oral potassium supplement increased.   · 11/29/16 - CT chest with small non-calcified right pulmonary nodules unchanged. Benign bone island in the midthoracic vertebral body along with benign bony hemangiomas in the middle thoracic vertebral bodies. CT abdomen and pelvis with mild progressive metastatic disease from CT of September 2016. Anterior abdominal wall mass superiorly mildly increased in size with new area noted as described measuring 3 x 1.7 cm. 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 Stable area of decreased uptake in the dome of the liver not hypermetabolic  on recent PET scan, likely representing cyst or focal fatty infiltration. Stable small hiatal hernia, fat-containing Bochdalek’s hernia and anterior abdominal wall hernias with stable pelvic floor relaxation.    · 12/1/16 - Cycle 3 chemotherapy given.   · 12/8/16 - Current chemotherapy discontinued and patient started on Gemcitabine 1000 mg/M2 IV days 1, 8, 15 q. 28 days.   · 12/22/16 - Patient seen in followup by Juliana Roy M.D. at the pain clinic, treated with Oxycodone and Lyrica. Transaminases normal. Patient started cycle 1 chemotherapy.   · 12/29/16 - Magnesium 1.1 (1.8-2.5), replaced intravenously.   · 1/5/17 - Cycle 1 day 15 chemotherapy held as patient leaving for vacation to [place]. Chemotherapy dose decreased by 20%. Patient complains of diarrhea for which Imodium prescribed.   · 1/11/17 - BRCA-1 and BRCA-2 negative.   · 1/12/17 - Echocardiogram with ejection fraction 60% or greater.   · 1/19/17 - Comprehensive metabolic panel with no significant abnormality. Patient started cycle 2 chemotherapy.   · 2/2/17 - Urine with >100,000 E-coli treated with Cipro 500 mg p.o. b.i.d. x1 week.   · 2/6/17 - Magnesium 1.1 (1.8-2.5), replaced intravenously.    · 2/23/17 - Patient started cycle 3 chemotherapy.   · 2/27/17 - CT chest with interval development of too numerous to count very small pulmonary nodules, ill-defined ground glass opacities concerning for development of pulmonary metastatic disease. Stable left-sided chest port. CT abdomen and pelvis with stable appearance of multiple small soft tissue densities within the abdomen near midline subcutaneous fat of the abdominal wall. Interval decrease in size of largely calcified left pelvic mass and multiple fat in bowel containing small ventral hernias.   · 3/6/17 - Pulmonary consultation obtained for lung nodules. Discussed CT results with patient. Decision made to continue present chemotherapy until further lung evaluation as abdominal disease  better.  of 18 (0-35).    · 3/16/17 - Patient started cycle 4 chemotherapy, but treatment held from day 8 onwards because of hospitalization.   · 3/19/17 - Patient was hospitalized at [facility] between 3/19/17 and 3/25/17 with chest pain. Chest x-ray had revealed increased mild bibasilar airspace disease. Troponin I and EKG’s were negative. INR was elevated. Patient was treated with antibiotics. EGD was performed revealing small hiatal hernia and esophageal dilatation was performed. Bronchoscopy was performed also with no intrabronchial lesions identified. IgA was 51 (), IgG 320 (600-1500) and IgM 19 (). Lexiscan nuclear stress test had no evidence of reversible myocardial ischemia with normal left ventricular ejection fraction of 58%. CT chest had development of patchy bilateral ground glass opacities most pronounced involving the left upper lobe and bilateral lower lobes. Multiple tiny bilateral pulmonary nodules were less conspicuous. The patient underwent a bronchoscopy by Jerica Esparza M.D. with right upper lobe bronchoalveolar lavage revealing benign squamous cells and bronchial cells with pulmonary macrophages present. There was mild acute inflammation. Negative for malignant cells. Prilosec was changed to Famotidine for hypomagnesemia.    · 4/6/17 - Magnesium 1.2 (1.8-2.5). Comprehensive metabolic panel with no significant abnormality. Patient seen in follow-up by Fito Ram M.D. at [facility]. Treated with Lyrica and Oxycodone.    · 5/4/17 - Patient complains of a rash itching on her right upper arm. Eczematous rash noted and patient prescribed Cyclocort 0.1% b.i.d. Magnesium infusions continued with each chemo. Order written to resume chemotherapy.   · 5/16/17 - Cycle 5 chemotherapy started.   · 5/26/17 - Magnesium 1.4 (1.8-2.5), replaced intravenously. Potassium 2.9 (3.6-5.1), KCL increased to 20 mEq three in the morning and three in the evenings.   · 5/30/17 - PET scan with  remaining metabolic activity within the nodular areas. The suggested mass which is partially calcified and necrotic within the left aspect of the pelvis seems slightly larger with increased metabolic activity. There was more calcification in these areas compared to prior study, suggesting inflammation.      · 6/8/17 - Patient complaining of shortness of breath. Does take HCTZ at home. Chest x-ray ordered and chemotherapy continued along with weekly magnesium replacement. Chest x-ray with no acute cardiopulmonary abnormalities.   · 6/13/17 - Patient received cycle 6 of chemotherapy.   · 7/31/17 - WBC 5.0, hemoglobin 11.2, MCV 89.7, platelet count 217,000. Patient is to resume chemotherapy starting with cycle 7 on Wednesday of this week.  of 19 (<35). Potassium 3.3 (3.5-5.3).     · 8/2/17 - Patient began cycle 7 of chemotherapy.   · 8/30/17 - Patient started cycle 8 chemotherapy.   · 9/5/17 - Patient seen in followup at Pain Management by Fito Ram M.D. and continued on treatment with Oxycodone and Lyrica.   · 9/13/17 - Patient was hospitalized at [facility] for a day with dizziness secondary to dehydration, hypokalemia and anemia. She was given 1 unit of packed red blood cells and electrolytes were replaced. Chest x-ray revealed a subtle opacity in the left lateral lung base favored to represent atelectasis. Magnesium 1.3 (1.5-2.5), patient continued on replacement.    · 9/22/17 - Chemotherapy and magnesium replacement continued with decrease in chemotherapy dose by 10% secondary to cytopenias.   · 9/25/17 - Cycle 9 chemotherapy started.   · 10/12/17 - CT of the chest with contrast showed several tiny pulmonary nodules. One within the right lower lobe appears new from prior exams. Two adjacent foci of nodularity within the medial right lower lobe suggestive of minor infectious or inflammatory process. CT of the abdomen and pelvis with contrast which showed soft tissue nodules along the anterior abdominal  and pelvic walls unchanged from previous scan. Also a partially calcified mass within the left pelvis unchanged. No acute process identified within the abdomen or pelvis. Several left paracentral ventral hernias unchanged. No evidence of acute bowel obstruction.   · 10/16/17 - Order written for Levaquin 500 mg p.o. daily as the patient states that she has had a productive cough, fever and chills and with the results of the CT scan showing a possible infectious versus inflammatory process. Order also written to continue chemotherapy and magnesium replacement as previously prescribed.   · 10/23/17 - Cycle 10 chemotherapy started.   · 11/19/17 - Patient went to the Emergency Room at [facility] complaining of right lower extremity redness and fever for a day. Venous Doppler had no evidence of a DVT and patient was discharged home on Clindamycin.   · 11/21/17 -  of 17 (0-35).   · 12/4/17 - Cycle 11 chemotherapy started.   · 12/20/17 - PET scan with multiple hypermetabolic soft tissue nodules abutting the anterior abdominal wall, stable from previous examination. Peripherally calcified left lower quadrant mass near the ascending colon stable in size demonstrating no hypermetabolic uptake. Previously had maximum SUV of 7. Right medial basilar pulmonary nodules resolved.   · 12/22/17 - CT left lower extremity with soft tissue swelling with skin thickening present along the anterior and medial aspect of the leg, slightly more pronounced around the palpable marker seen within the upper medial aspect of the lower extremity.   · 12/26/17 - Elastic stockings prescribed for lower extremity edema.   · 1/8/18 - Patient hospitalized at [facility] between 1/8/18 and 1/11/18 with fever of up to 101 degrees and painful rash on the lower extremities of several weeks’ duration. She was found to be hypokalemic and MRI of the lower extremities revealed thickening and swelling. Chest x-ray had no acute cardiopulmonary abnormality. Skin biopsy was  performed by Surgery revealing stasis dermatitis and no evidence of malignancy. Infectious Disease consultation was obtained and IV antibiotics were stopped. Blood cultures had no growth.   · 1/22/18 - Patient asked to start wearing elastic stockings which she has not started yet. She was given a prescription for Dyazide 1 p.o. daily.   · 2/26/18 - Ordered chemo to resume again. Patient unaware that she was supposed to resume chemo after her last visit in January. Continue magnesium infusions on day 1, 8 and 15. Prescribed Levaquin 500 mg p.o. daily x10 days for productive cough x1 week. History of viral illness consistent with influenza.  of 18 (0-35). Chest x-ray with no acute process.    · 3/5/18 - Cycle 12 chemotherapy started.   · 3/26/18 - Options discussed with patient. Decision made to discontinue IV Gemzar and orders written to start on Niraparib (Zejula) 300 mg p.o. daily as maintenance therapy.   · 4/11/18 - Niraparib discontinued due to insurance denial. Orders written to start Carboplatin-AUC 5 IV day 1 cycling every 21 days. Orders written for Neulasta 6 mg subcutaneous kit day 1 with palliative treatment intent and expected duration of treatment three months.   · 4/12/18 - CT chest, abdomen and pelvis with contrast revealed numerous tiny centrilobular nodules in the lungs favored to reflect infectious or inflammatory process. Nodules ranged 1-3 mm in size and are new from prior studies with metastatic disease not entirely excluded. Stable small hiatal hernia. Interval progression of metastatic disease involving the subcutaneous tissues at the ventral abdominal wall. Multiple soft tissue density nodules previously shown to be hypermetabolic on PET scan. New small crescent of low attenuation material along the periphery of the spleen with similar finding at the dome of the liver. Findings are concerning for peritoneal metastases. Density of the dome of the liver remained unchanged from multiple  prior studies. Stable calcified mass in the left pelvic sidewall. Urinary bladder wall thickening.   · 4/23/18 - Cycle 1 chemotherapy with Carboplatin administered along with Neulasta injection.   · 4/26/18 - Neulasta discontinued due to insurance denial.   · 5/14/18 - Patient received cycle 2 Carboplatin.   · 6/5/18 - Cycle 3 day 1 chemotherapy with Carboplatin administered.   · 6/20/18 - CT chest, abdomen and pelvis at [facility] showed re-demonstration of soft tissue mass in the ventral wall hernia left of the midline as well as the dome of the liver, likely representing metastatic disease similar as compared to the prior study. Additional calcified lesions present in the upper left hemipelvis and along the anterior abdominal wall to the right of the midline also likely representing metastatic disease. No definite new lesions were identified. Moderate to large stool burden likely related to mild constipation was present. No suspicious lung nodules were identified. The previously-described ground glass nodules appeared to have resolved. There was a stable subpleural nodule in the right upper lobe measuring 3 mm present since 2014 without significant changes.   · 6/26/18 - Cycle 4 day 1 Carboplatin administered with addition of Neulasta for febrile neutropenia prophylaxis.   · 6/29/18 - Reviewed CT scans with patient. Orders written to discontinue Carboplatin and Neulasta and plan to start Niraparib 300 mg by mouth daily as maintenance therapy. Prescribed Amlodipine 2.5 mg p.o. daily for chemo-induced hypertension.   · 7/18/18 - Orders written to increase weekly Magnesium Sulfate to 3 g IV weekly due to continued hypomagnesemia.   · 8/1/18 - Patient reports she has not received Niraparib (Zejula) to date.   · 8/30/18 - Patient’s phone was not working so though Niraparib approved, the drug company had not been able to get ahold of her. Patient supplied with drug company number so that she can start taking  the pills.   · 9/6/18 -  of 20 (N).   · 9/18/18 - Urinalysis done for dysuria and back pain. Urine culture grew 20,000 to 50,000 colonies of urogenital ruy. Prescribed Bactrim DS one tablet twice daily for one week.   · September 2018 - Patient initiated on Zejula 300 mg p.o. daily.   · 10/1/18 - WBC 3.5, hemoglobin 12, platelet count 74,000. Niraparib (Zejula) dose held and then resumed when platelets above 100,000 at a dose of 200 mg daily.   · 10/15/18 - Patient received Niraparib (Zejula) at 200 mg p.o. daily with a platelet count of 198,000.   · 11/7/18 - WBC 6, hemoglobin 11.6, MCV 96.2, platelet count 137,000. Patient claims to have stopped taking Niraparib a few days prior because of cough. Asked to resume taking it. Ciprofloxacin 500 mg p.o. twice daily for one week for bronchitis.   · 12/3/18 - Magnesium Sulfate infusions changed to every two weeks, to send mag level before and give 3 grams. DC’d Magnesium Oxide and started Magnesium Plus Protein two pills twice daily.   · 1/4/19 - CT chest, abdomen and pelvis with new patchy nodular areas of airspace consolidation within the bilateral upper lobes, left greater than right, favored to be infectious or inflammatory. Interval enlargement of the peritoneal soft tissue masses within the pelvis compatible with progression of disease. Enlarging ventral hernia now containing multiple loops of small bowel and colon.   · 1/7/19 - Patient has not been coming in for weekly magnesium replacement as nursing has not been able to get ahold of her. Results of CT’s discussed with patient. Decision made to change her to IV chemotherapy with Carboplatin AUC-5 day 1 and pegylated liposomal Doxorubicin (Doxil) 30 mg/M2 IV day 1 to cycle every four weeks. Patient made aware of the limited choices of her treatment available.   · 1/31/19 - Echocardiogram showed LVEF 50-55% and otherwise normal echo Doppler study.   · 2/4/19 - New chemotherapy not initiated to date with  difficulty contacting patient for echocardiogram. Neulasta On-Pro 6 mg ordered with chemotherapy due to extensive prior exposure to chemotherapy, increasing risk of febrile neutropenia, history of neutropenic events on prior chemotherapy regimens.   · 2/15/19 - Patient started cycle 1 chemotherapy with Neulasta support.   · 3/6/19 -  of 28 (0-35).   · 3/15/19 - Cycle 2 Carboplatin with Liposomal Doxorubicin (Doxil) and Neulasta support initiated.     · 4/10/19 - Patient was requested to followup with Dr. Queen regarding hypotension and blood pressure medication dosing. Patient appears to be tolerating treatment well with cytopenias not requiring dose adjustments. No Doxil-related skin or mucus membrane toxicities or other significant toxicities to date.   · 4/12/19 - Cycle 3 chemotherapy given.   · 4/16/19 - CT chest, abdomen and pelvis with no evidence of active metastasis to the chest. Several tree-in-bud nodules within the periphery of the inferior right upper lobe likely infectious or inflammatory. Disease burden within abdomen and pelvis stable to slightly increased from prior CT. Specifically, a soft tissue mass along the right rectus muscle slightly increased in bulk. Multiple ventral hernias, some containing loops of bowel, with no evidence of acute bowel obstruction.   · 5/8/19 - Discussed CT results with patient. Overall stable disease and would like to continue same treatment. Dove soap and Hydrocortisone Cream p.r.n. prescribed for scattered rash on back.   · 5/10/19 - Cycle 4 Carboplatin and Liposomal Doxorubicin initiated.   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 It was ER positive, HER2/zuleima negative, PD-L1 negative. There was TOPO1 positivity and TUBB3 positivity. TMB was low (two mutations per megabyte MUTS/MB) and MSI was stable. There were 13 therapies considered with increased benefit. The 13 therapies with increased  benefit included Fulvestrant, Irinotecan, Letrozole, Topotecan, Anastrazole, Exemestane, Tamoxifen, all of which were referenced to [facility] as well as Abemaciclib, Everolimus, Gefitinib, Palbociclib, Ribociclib and Toremifene.     · 6/5/19 echocardiogram with preserved LV systolic function with EF around 60%.  · 6/7/19 cycle 5 chemotherapy with Neulasta support given.  Patient continued on mag oxide 400 mg p.o. twice daily and weekly IV 3 g mag sulfate infusions for persistent hypomagnesemia.  · 7/5/2019- cycle 6 chemotherapy with carboplatin and doxorubicin with Neulasta port initiated.  Ca125:  21 (0-35).  · 7/23/2019-CT chest abdomen and pelvis compared to CT from 4/16/2019 revealed multiple small pulmonary nodules unchanged from prior examination within the right upper and left lower lobes.  Complex ventral hernia similar to prior exam.  Soft tissue nodule along the right lateral margin of the right of the midline measuring 12 x 10 mm unchanged in size.  Irregular soft tissue nodular thickening along the margin of the most inferior aspect of the complex ventral hernia with thickness measuring up to 13 mm similar to prior examination.  Extensive calcified and soft tissue mass within the left lower quadrant decrease in size now measuring 2.6 x 2.3 cm previously 3.0 x 2.5 cm.  Partially calcified mass involving the right rectus sheath measuring 3.1 x 2.7 cm previously measured 3.3 x 2.9 cm.  Densely calcified mass measuring 2.1 x 1.6 cm unchanged previously measured 2 x 1.7 cm.  Right external iliac chain lymph node measuring 9 mm previously measured 5 mm.  · 8/2/2019 cycle 7 chemotherapy given.  · 8/30/2019-cycle 8 chemotherapy with carboplatin and doxorubicin with Neulasta support given.  · 9/27/2019 cycle 9 chemotherapy given.  · 10/1/19 - Echocardiogram showed Normal LV size and contractility EF of 60-65%. Normal RV size. Borderline left atrial enlargement. Aortic valve, mitral valve, tricuspid valve appears  structurally normal, no significant regurgitation seen.No pericardial effusion seen. Proximal aorta appears normal in size.  · 10/18/19 - Magnesium 1.5 (1.8-2.5).  IV magnesium replacement continued.  · 10/25/2019 cycle 10 chemotherapy given.  · 10/29/2019 patient had CT scan of the chest abdomen and pelvis-there is a new 2 mm nodule in the left lower lobe with a stable 2 mm nodule in the left lower lobe.  Follow-up in 6 months was recommended.  Stable multiple soft tissue density and calcified nodules within the ventral abdominal wall and left retroperitoneal fat consistent with nonviable metastatic disease.  These nodules have either decreased in size or stable.  · 11/22/2019 10 received cycle 11 of chemotherapy with Doxil and carboplatinum with Neulasta     2. Deep vein thrombosis of left upper extremity diagnosis established in October of 2013.  · 10/16/13 - Venous Doppler of left upper extremity.  Impression:  Deep vein thrombosis involving the subclavian, axillary and brachial veins on the left side, superficial vein thrombosis involving the left basilic vein.  · 10/16/13 - Order written for Lovenox 120 mg subcutaneously daily until INR is in therapeutic range.  Order written for Coumadin 5 mg p.o. daily.  The patient is to follow PT and INR protocol.  · 5/20/16 - Venous Doppler bilateral lower extremities normal.  · 7/30/19 - Patient continued on Coumadin following the INR protocol.      3. Anemia diagnosis established in November 2013 and pernicious anemia diagnosis established March 2016.   · 11/15/13 - Hemoglobin is 7.8.  · 12/2/13 - Orders written to start Aranesp 200 mg subcutaneous every two weeks due to low hemoglobin secondary to chemo induced anemia.  Hemoglobin is 9.7.  Anemia workup is ordered.  · 12/03/13 - Ferritin 179.8 (N), folic acid 8.3 (N), vitamin B12 314, iron 104 (N), TIBC 298 (N), iron saturation 35 (N), Reticulocyte count 0.88 (N).  EPO level 124 (N).  Haptoglobin 22 (H).  · 10/22/14 -  Hemoglobin 12.5, hematocrit 37.3, MCV 91.6.  · 10/23/14 - Anemia resolved.   · 3/8/16 - WBC 5.8, hemoglobin 11.7, MCV 90.3, platelet count 245,000. Vitamin B12 of 189 (180-914), ferritin 61 (), iron 91 (), TIBC 345 (228-428).   · 3/15/16 -  ().        · 3/22/16 - Intrinsic factor antibody positive, antiparietal antibody negative. Vitamin B12 at 1000 mcg IM weekly started, but the patient after receiving first dose on 3/22/16 did not come back for injections until 4/13/16.   · 4/13/16 - WBC 5.1, hemoglobin 12.5, MCV 87.9, platelet count 201,000. Patient given B12 injection and then continued at home.   · 5/10/16 - WBC 5.1, hemoglobin 12.5, platelet count 201,000.   · 6/14/16 - Monthly Vitamin B12 injections continued at home.   · 7/11/16 - WBC 5.48, hemoglobin 12.7, MCV 86.2, platelet count 200,000.   · 8/16/16 - Patient continued on monthly Vitamin B12 injections at home.   · 1/5/17 - WBC 2.6 with 38% neutrophils, 56% lymphocytes, 5% monocytes, hemoglobin 10.5, .3, platelet count 63,000. Patient continued on Vitamin B12 injections 1000 mcg IM monthly at home.   · 3/20/17 - Retic 0.41 (0.5-1.5), creatinine 1.0 (0.4-1.0). Iron 12 (), TIBC 270 (228-428), ferritin 259 (), haptoglobin 340 (), TSH 0.43 (0.34-5.6), folate 6.0 (5.9-24.8). Serum protein electrophoresis revealed decreased gammaglobulins. Serum EDU had no monoclonal gammopathy identified. Stool Hemoccult was negative.   · 6/8/17 - WBC 6.3, hemoglobin 8.3, MCV 92.4, platelet count 50,000. Procrit 40,000 units subq weekly added for chemotherapy-induced anemia. Patient continued on Vitamin B12 at 1000 mcg IM monthly at home.   · 7/5/17 - Iron 33 (), TIBC 328 (228-428), ferritin 211 (), TSH 2.46 (0.34-5.6).       · 7/31/17 - WBC 5.0, hemoglobin 11.2, MCV 89.7, platelet count 217,000. We will continue with the Procrit 40,000 units subq weekly for chemo-induced anemia when the hemoglobin falls below 10.0  and the patient is also to continue with the Vitamin B12 at 1000 mcg IM monthly at home. Creatinine 1.24 (0.5-0.99).    · 8/28/17 - WBC 9.6, hemoglobin 8.7, MCV 93.7, platelet count 133,000. Patient is to continue with the Procrit 40,000 units subq weekly and will receive that today. She is also to continue with the Vitamin B12 at 1000 mcg IM monthly at home.  · 10/16/17 - WBC 7.5, hemoglobin 9.6, MCV 98.4, platelet count 195,000. Patient is to continue with Procrit 40,000 units subq weekly and will receive Procrit today.   · 1/1/18 - Creatinine 1.3 (0.4-1.0).    · 2/19/18 - Procrit given for hemoglobin 9.9.   · 2/26/18 - Continue Procrit 40,000 units weekly p.r.n. hemoglobin <10. Continue Vitamin B12 at 1000 mcg IM monthly at home.   · 3/26/18 - Hemoglobin 8.3. Procrit dose increased to 60,000 units weekly. Iron 29 (), TIBC 306 (228-428), and iron sat 9% (15-50). Iron 29 (), TIBC 306 (228-428), iron saturation 9% (15-50).   · 3/27/18 - Order written to start Ferrous sulfate 325 mg p.o. b.i.d.    · 4/2/18 - Stool heme negative x3.   · 4/30/18 - Patient had not started Ferrous sulfate 325 mg p.o. b.i.d. and verbalized understanding to do so and to notify the office if side effects are not tolerable.   · 5/24/18 - Patient was hospitalized at [facility] between 5/24/18 and 5/26/18 with dark tarry stool. INR was subtherapeutic. She was given IV Protonix and Protonix 40 mg daily. She underwent an EGD by David Rogers M.D. revealing small hiatal hernia, small submucosal nodule near the cardia, erosive gastritis. Pathology on gastric antrum and body biopsy revealed iron pill gastritis and no evidence of Helicobacter pylori. She then underwent a colonoscopy revealing diverticulosis, hemorrhoids and surgical changes from previous resection. It was recommended to consider outpatient M2A if hemoglobin continued to drop.   · 6/4/18 - Order written to discontinue iron pills and to use Injectafer 750 mg IV days 1  and 8 for low iron sats. Order written for Procrit 40,000 units subq weekly. Iron 65 (), TIBC 328 (228-428), iron saturation 20% (15-50), ferritin 123 ().   · 6/29/18 - Discussed patient’s intolerance of oral iron due to gastritis. Oral iron discontinued with plans for Injectafer should iron level drop in the future.   · 11/7/18 - Patient claims not to be taking Vitamin B12 injections at home. Asked to resume those.   · 12/3/18 - Patient reports she has not been taking her B12 injections for a couple of months. She needs some syringes and needles for that.   · 2/4/19 - Patient is uncertain if she is currently taking Ferrous Sulfate or not. Patient with history of intolerance and will follow hemoglobin.   · 5/8/19 - Ferritin 64 ().  · 8/27/2019- iron 52 (), iron saturation 14% (15-50), TIBC 364 (228-420), and ferritin 74 ().  Injectafer 750 mg IV day 1 and 8 due to oral iron intolerance ordered.  · 8/30/2019- Injectafer 750 mg IV day 1 given.  Hemoglobin 10.8.  At the end of the infusion heart rate was 48 and the patient reported mild dizziness.  Patient was sent to the ED for evaluation with symptoms resolving rapidly.  Chest x-ray and CT head were negative for acute findings.  · 9/6/2019-Injectafer 750 mg IV day 8 given without adverse effects.  Hemoglobin 9.8.   · 9/25/19 - Iron 85 (). Iron saturation 25 (15-50). TIBC 335 (228-428). Ferritin  694 ().  Hemoglobin 10.3.  · 10/25/2019 hemoglobin 9.7.  Patient started on Retacrit 40,000 units subcu weekly.    Past Medical History:   Diagnosis Date   • Anemia 2013   • Cervical disc disorder 2013    Herniated disc, pinched nerve   • Clotting disorder (CMS/HCC) 2013    Low platelets from chemo   • Colon polyp 2013   • Deep vein thrombosis (CMS/HCC) 2013   • Diverticulosis 2013   • GERD (gastroesophageal reflux disease) 2016   • HL (hearing loss) 2016    I need hearing aids   • Hyperlipidemia 2013    Need my cholesterol rechecked    • Hypertension    • Joint pain 2013    Shoulder pain, torn rotator cuff   • Low back pain 2013   • Neuromuscular disorder (CMS/[facility]) 2015    Shingles, pinched nerves in my neck   • Osteopenia 2014   • Osteoporosis    • Ovarian cancer (CMS/[facility])    • Pneumonia 2010    Have had it several times   • Spinal stenosis 2013    Cervical   • Urinary tract infection 2013    Have had several utis       Past Surgical History:   Procedure Laterality Date   • COLON SURGERY     • COLONOSCOPY     • EXPLORATORY LAPAROTOMY     • HERNIA REPAIR     • HYSTERECTOMY     • OOPHORECTOMY           Current Outpatient Medications:   •  Acetaminophen 500 MG coapsule, Take 100 mg by mouth 4 (Four) Times a Day. As needed, Disp: , Rfl:   •  atorvastatin (LIPITOR) 20 MG tablet, Take 20 mg by mouth every night at bedtime., Disp: , Rfl: 3  •  cyanocobalamin 1000 MCG/ML injection, INJECT 1 ML INTRAMUSCULARLY EVERY MONTH, Disp: , Rfl: 3  •  famotidine (PEPCID) 40 MG tablet, TAKE 1 TABLET BY MOUTH EVERY MORNING BEFORE BREAKFAST, Disp: 90 tablet, Rfl: 1  •  hydrochlorothiazide (HYDRODIURIL) 25 MG tablet, Take 25 mg by mouth Daily., Disp: , Rfl:   •  KLOR-CON 20 MEQ CR tablet, TAKE 3 TABLETS BY MOUTH EVERY MORNING AND TAKE 2 TABLETS AT BEDTIME, Disp: 450 tablet, Rfl: 1  •  lisinopril (PRINIVIL,ZESTRIL) 20 MG tablet, Take 20 mg by mouth Daily., Disp: , Rfl:   •  LYRICA 100 MG capsule, Take 1 capsule by mouth 3 (Three) Times a Day., Disp: 90 capsule, Rfl: 2  •  magnesium oxide (MAG-OX) 400 MG tablet, TAKE 1 TABLET BY MOUTH TWICE A DAY, Disp: 180 tablet, Rfl: 1  •  ondansetron ODT (ZOFRAN-ODT) 8 MG disintegrating tablet, PLACE 1 TABLET IN MOUTH TO DISSOLVE EVERY 8 HOURS AS NEEDED FOR NAUSEA, Disp: 20 tablet, Rfl: 3  •  oxyCODONE (ROXICODONE) 10 MG tablet, Take 1 tablet by mouth 3 (Three) Times a Day As Needed for Moderate Pain ., Disp: 90 tablet, Rfl: 0  •  promethazine (PHENERGAN) 25 MG tablet, Take 1 tablet by mouth Every 6 (Six) Hours As Needed for Nausea  "or Vomiting., Disp: 12 tablet, Rfl: 0  •  sertraline (ZOLOFT) 50 MG tablet, TAKE 1 TABLET BY MOUTH EVERY EVENING BEFORE BED, Disp: 90 tablet, Rfl: 1  •  Syringe/Needle, Disp, (B-D 3CC LUER-JESSICA SYR 23GX1\") 23G X 1\" 3 ML misc, BD LUER-JESSICA SYRINGE 23G X 1\" 3 ML, Disp: , Rfl:   •  temazepam (RESTORIL) 30 MG capsule, TAKE 1 CAPSULE BY MOUTH ONCE A DAY AT BEDTIME AS NEEDED FOR SLEEP, Disp: 30 capsule, Rfl: 0  •  triamterene-hydrochlorothiazide (DYAZIDE) 37.5-25 MG per capsule, TAKE 1 CAPSULE BY MOUTH EVERY DAY, Disp: 90 capsule, Rfl: 1  •  valACYclovir (VALTREX) 500 MG tablet, Take 1,000 mg by mouth Daily., Disp: , Rfl:   •  WARFARIN SODIUM PO, Take 5 mg by mouth Daily. 5.5mg DAILY, Disp: , Rfl: 3  No current facility-administered medications for this visit.     Facility-Administered Medications Ordered in Other Visits:   •  epoetin meghna-epbx (RETACRIT) injection 40,000 Units, 40,000 Units, Subcutaneous, Once, Amara Thomson FNP  •  heparin flush (porcine) 100 UNIT/ML injection 500 Units, 500 Units, Intravenous, PRN, Daryn Tay MD, 500 Units at 12/06/19 1212  •  sodium chloride 0.9 % flush 10 mL, 10 mL, Intravenous, PRN, Daryn Tay MD, 10 mL at 12/06/19 1212  •  sodium chloride 0.9 % infusion 250 mL, 250 mL, Intravenous, Once, Valencia Cadena APRN    Allergies   Allergen Reactions   • Morphine Nausea Only and Hives   • Penicillin V Potassium Rash   • Sulfa Antibiotics Rash   • Levaquin [Levofloxacin] Nausea Only and Dizziness     When taken with Coumadin   • Amoxicillin Rash   • Norco [Hydrocodone-Acetaminophen] Rash       Family History   Problem Relation Age of Onset   • Hypertension Mother    • Cancer Father    • Hypertension Father    • Hypertension Sister    • Hypertension Brother    • Stroke Brother        Cancer-related family history includes Cancer in her father.    Social History     Tobacco Use   • Smoking status: Never Smoker   • Smokeless tobacco: Never Used   Substance Use Topics   • " Alcohol use: No     Frequency: Never   • Drug use: No       I have reviewed the history of present illness, past medical history, family history, social history, lab results, all notes and other records since the patient was last seen on 11/27/2019.    SUBJECTIVE:  The patient is here for a follow up appointment.  The patient states that she continues to receive chemotherapy without complications.  The patient denies any new symptoms.            REVIEW OF SYSTEMS:  Review of Systems   Constitutional: Negative for chills and fever.   HENT: Negative for ear pain, mouth sores, nosebleeds and sore throat.    Eyes: Negative for photophobia and visual disturbance.        Wears eye glasses   Respiratory: Negative for wheezing and stridor.    Cardiovascular: Negative for chest pain and palpitations.   Gastrointestinal: Negative for abdominal pain, diarrhea, nausea and vomiting.   Endocrine: Negative for cold intolerance and heat intolerance.   Genitourinary: Negative for dysuria and hematuria.   Musculoskeletal: Negative for joint swelling and neck stiffness.   Skin: Negative for color change and rash.   Neurological: Negative for seizures and syncope.   Hematological: Negative for adenopathy.        No obvious bleeding   Psychiatric/Behavioral: Negative for agitation, confusion and hallucinations.       OBJECTIVE:    There were no vitals filed for this visit.    ECOG  (1) Restricted in physically strenuous activity, ambulatory and able to do work of light nature    Physical Exam   Constitutional: She is oriented to person, place, and time. No distress.   HENT:   Head: Normocephalic and atraumatic.   Eyes: Conjunctivae and EOM are normal. Right eye exhibits no discharge. Left eye exhibits no discharge. No scleral icterus.   Neck: Normal range of motion. Neck supple. No thyromegaly present.   Cardiovascular: Normal rate, regular rhythm and normal heart sounds. Exam reveals no gallop and no friction rub.   Pulmonary/Chest:  Effort normal. No stridor. No respiratory distress. She has no wheezes.   Left chest wall port   Abdominal: Soft. Bowel sounds are normal. She exhibits no mass. There is no tenderness. There is no rebound and no guarding.   Musculoskeletal: Normal range of motion. She exhibits no tenderness.   Lymphadenopathy:     She has no cervical adenopathy.   Neurological: She is alert and oriented to person, place, and time. She exhibits normal muscle tone.   Skin: Skin is warm. No rash noted. She is not diaphoretic. No erythema.   Psychiatric: She has a normal mood and affect. Her behavior is normal.   Nursing note and vitals reviewed.      RECENT LABS  WBC   Date Value Ref Range Status   12/06/2019 4.02 3.40 - 10.80 10*3/mm3 Final     RBC   Date Value Ref Range Status   12/06/2019 2.62 (L) 3.77 - 5.28 10*6/mm3 Final     Hemoglobin   Date Value Ref Range Status   12/06/2019 9.3 (L) 12.0 - 15.9 g/dL Final     Hematocrit   Date Value Ref Range Status   12/06/2019 28.6 (L) 34.0 - 46.6 % Final     MCV   Date Value Ref Range Status   12/06/2019 109.2 (H) 79.0 - 97.0 fL Final     MCH   Date Value Ref Range Status   12/06/2019 35.5 (H) 26.6 - 33.0 pg Final     MCHC   Date Value Ref Range Status   12/06/2019 32.5 31.5 - 35.7 g/dL Final     RDW   Date Value Ref Range Status   12/06/2019 14.8 12.3 - 15.4 % Final     RDW-SD   Date Value Ref Range Status   12/06/2019 56.4 (H) 37.0 - 54.0 fl Final     MPV   Date Value Ref Range Status   12/06/2019 11.4 6.0 - 12.0 fL Final     Platelets   Date Value Ref Range Status   12/06/2019 61 (L) 140 - 450 10*3/mm3 Final     Neutrophil %   Date Value Ref Range Status   12/06/2019 59.2 42.7 - 76.0 % Final     Lymphocyte %   Date Value Ref Range Status   12/06/2019 28.9 19.6 - 45.3 % Final     Monocyte %   Date Value Ref Range Status   12/06/2019 8.0 5.0 - 12.0 % Final     Eosinophil %   Date Value Ref Range Status   12/06/2019 3.7 0.3 - 6.2 % Final     Basophil %   Date Value Ref Range Status    12/06/2019 0.2 0.0 - 1.5 % Final     Neutrophils, Absolute   Date Value Ref Range Status   12/06/2019 2.38 1.70 - 7.00 10*3/mm3 Final     Lymphocytes, Absolute   Date Value Ref Range Status   12/06/2019 1.16 0.70 - 3.10 10*3/mm3 Final     Monocytes, Absolute   Date Value Ref Range Status   12/06/2019 0.32 0.10 - 0.90 10*3/mm3 Final     Eosinophils, Absolute   Date Value Ref Range Status   12/06/2019 0.15 0.00 - 0.40 10*3/mm3 Final     Basophils, Absolute   Date Value Ref Range Status   12/06/2019 0.01 0.00 - 0.20 10*3/mm3 Final     nRBC   Date Value Ref Range Status   08/30/2019 0.1 0.0 - 0.2 /100 WBC Final       Lab Results   Component Value Date    GLUCOSE 98 11/22/2019    BUN 15 11/22/2019    CREATININE 0.90 11/22/2019    EGFRIFNONA 62 11/22/2019    EGFRIFAFRI >60 06/07/2019    BCR 16.5 11/22/2019    K 3.8 11/22/2019    CO2 27.0 11/22/2019    CALCIUM 9.1 11/22/2019    ALBUMIN 4.00 11/22/2019    LABIL2 1.5 06/07/2019    AST 21 11/22/2019    ALT 10 11/22/2019         Assessment/Plan     Malignant neoplasm of right ovary (CMS/HCC)  - CBC & Differential  - Comprehensive Metabolic Panel      ASSESSMENT:    1. Recurrent ovarian cancer on carboplatinum, Doxil  2. Iron deficiency anemia  3. Pancytopenia  4. Hypomagnesemia  5. B12 deficiency on parenteral B12 replacement  6. Monitoring for chemotherapy toxicities    The patient claims to be tolerating chemotherapy well and her scans are stable except for a new 2 mm nodule in the left lower lobe of the lung.  Have made her aware that if her tumors continue to shrink that we will continue her on the same treatment but if they stop shrinking, then we will stop the current treatment and observe her.  Her tumor markers have mostly been in the normal range.  She is taking 3 magnesium pills daily and in addition getting weekly IV replacement as needed.  She is not taking any oral iron as she does not absorb it.  On  Coumadin, her INR is 2 today.  She is to continue herself on  vitamin B12 injections to switch to treatment at the cancer center.    PLAN:     Continue chemotherapy.  Continue magnesium oxide 400 mg three times a day and weekly IV replacement as needed.   Continue Coumadin  CT CAP shows stable disease except for small 2 mm nodule in the left lower lobe which will be monitored  Continue b12 injections IM monthly.  We will transition injections to be done at the cancer center  Continue Retacrit 40,000 units subcu weekly for hemoglobin less than 10          I have reviewed labs results, imaging, vitals, and medications with the patient today. Will follow up in 1 months with me.    Patient verbalized understanding and is in agreement of the above plan.    Part of this document was scribed by Mallory Connors, RN, BSN.

## 2019-12-06 NOTE — TELEPHONE ENCOUNTER
Pt is traveling to see family bc her cancer is spreading and the treatments are not working. She is scheduled to fly to [place] on 12-8 and her rx has a dnf for the 10th. Can we call [facility] on [address] and ok for them to fill tomorrow or Sunday???

## 2019-12-07 DIAGNOSIS — R05.9 COUGH: ICD-10-CM

## 2019-12-08 ENCOUNTER — APPOINTMENT (OUTPATIENT)
Dept: GENERAL RADIOLOGY | Facility: HOSPITAL | Age: 64
End: 2019-12-08

## 2019-12-08 ENCOUNTER — HOSPITAL ENCOUNTER (INPATIENT)
Facility: HOSPITAL | Age: 64
LOS: 3 days | Discharge: HOME | End: 2019-12-11
Attending: EMERGENCY MEDICINE | Admitting: HOSPITALIST

## 2019-12-08 DIAGNOSIS — D70.9 NEUTROPENIC FEVER (HCC): Primary | ICD-10-CM

## 2019-12-08 DIAGNOSIS — R50.81 NEUTROPENIC FEVER (HCC): Primary | ICD-10-CM

## 2019-12-08 LAB
ALBUMIN SERPL-MCNC: 3.7 G/DL (ref 3.5–5.2)
ALBUMIN/GLOB SERPL: 1.4 G/DL
ALP SERPL-CCNC: 119 U/L (ref 39–117)
ALT SERPL W P-5'-P-CCNC: 7 U/L (ref 1–33)
ANION GAP SERPL CALCULATED.3IONS-SCNC: 9 MMOL/L (ref 5–15)
AST SERPL-CCNC: 17 U/L (ref 1–32)
B PERT DNA SPEC QL NAA+PROBE: NOT DETECTED
BASOPHILS # BLD AUTO: 0 10*3/MM3 (ref 0–0.2)
BASOPHILS NFR BLD AUTO: 0.3 % (ref 0–1.5)
BILIRUB SERPL-MCNC: 0.2 MG/DL (ref 0.2–1.2)
BILIRUB UR QL STRIP: NEGATIVE
BUN BLD-MCNC: 14 MG/DL (ref 8–23)
BUN/CREAT SERPL: 16.7 (ref 7–25)
C PNEUM DNA NPH QL NAA+NON-PROBE: NOT DETECTED
CALCIUM SPEC-SCNC: 8.5 MG/DL (ref 8.6–10.5)
CHLORIDE SERPL-SCNC: 102 MMOL/L (ref 98–107)
CK SERPL-CCNC: 57 U/L (ref 20–180)
CLARITY UR: CLEAR
CO2 SERPL-SCNC: 26 MMOL/L (ref 22–29)
COLOR UR: YELLOW
CREAT BLD-MCNC: 0.84 MG/DL (ref 0.57–1)
D-LACTATE SERPL-SCNC: 0.5 MMOL/L (ref 0.5–2)
DEPRECATED RDW RBC AUTO: 62.6 FL (ref 37–54)
EOSINOPHIL # BLD AUTO: 0 10*3/MM3 (ref 0–0.4)
EOSINOPHIL NFR BLD AUTO: 2 % (ref 0.3–6.2)
ERYTHROCYTE [DISTWIDTH] IN BLOOD BY AUTOMATED COUNT: 16.7 % (ref 12.3–15.4)
FLUAV H1 2009 PAND RNA NPH QL NAA+PROBE: NOT DETECTED
FLUAV H1 HA GENE NPH QL NAA+PROBE: NOT DETECTED
FLUAV H3 RNA NPH QL NAA+PROBE: NOT DETECTED
FLUAV SUBTYP SPEC NAA+PROBE: NOT DETECTED
FLUBV RNA ISLT QL NAA+PROBE: NOT DETECTED
GFR SERPL CREATININE-BSD FRML MDRD: 68 ML/MIN/1.73
GLOBULIN UR ELPH-MCNC: 2.6 GM/DL
GLUCOSE BLD-MCNC: 92 MG/DL (ref 65–99)
GLUCOSE UR STRIP-MCNC: NEGATIVE MG/DL
HADV DNA SPEC NAA+PROBE: NOT DETECTED
HCOV 229E RNA SPEC QL NAA+PROBE: NOT DETECTED
HCOV HKU1 RNA SPEC QL NAA+PROBE: NOT DETECTED
HCOV NL63 RNA SPEC QL NAA+PROBE: NOT DETECTED
HCOV OC43 RNA SPEC QL NAA+PROBE: NOT DETECTED
HCT VFR BLD AUTO: 31 % (ref 34–46.6)
HGB BLD-MCNC: 10.4 G/DL (ref 12–15.9)
HGB UR QL STRIP.AUTO: NEGATIVE
HMPV RNA NPH QL NAA+NON-PROBE: NOT DETECTED
HOLD SPECIMEN: NORMAL
HOLD SPECIMEN: NORMAL
HPIV1 RNA SPEC QL NAA+PROBE: NOT DETECTED
HPIV2 RNA SPEC QL NAA+PROBE: NOT DETECTED
HPIV3 RNA NPH QL NAA+PROBE: NOT DETECTED
HPIV4 P GENE NPH QL NAA+PROBE: NOT DETECTED
INR PPP: 3.05 (ref 2–3)
INR PPP: 3.19 (ref 2–3)
KETONES UR QL STRIP: NEGATIVE
LEUKOCYTE ESTERASE UR QL STRIP.AUTO: NEGATIVE
LYMPHOCYTES # BLD AUTO: 0.5 10*3/MM3 (ref 0.7–3.1)
LYMPHOCYTES NFR BLD AUTO: 20.7 % (ref 19.6–45.3)
M PNEUMO IGG SER IA-ACNC: NOT DETECTED
MAGNESIUM SERPL-MCNC: 1.3 MG/DL (ref 1.6–2.4)
MCH RBC QN AUTO: 35.5 PG (ref 26.6–33)
MCHC RBC AUTO-ENTMCNC: 33.4 G/DL (ref 31.5–35.7)
MCV RBC AUTO: 106.3 FL (ref 79–97)
MONOCYTES # BLD AUTO: 0.2 10*3/MM3 (ref 0.1–0.9)
MONOCYTES NFR BLD AUTO: 7.4 % (ref 5–12)
NEUTROPHILS # BLD AUTO: 1.6 10*3/MM3 (ref 1.7–7)
NEUTROPHILS NFR BLD AUTO: 69.6 % (ref 42.7–76)
NITRITE UR QL STRIP: NEGATIVE
NRBC BLD AUTO-RTO: 0.2 /100 WBC (ref 0–0.2)
NT-PROBNP SERPL-MCNC: 138.3 PG/ML (ref 5–900)
PH UR STRIP.AUTO: 5.5 [PH] (ref 5–8)
PLATELET # BLD AUTO: 59 10*3/MM3 (ref 140–450)
PMV BLD AUTO: 8.8 FL (ref 6–12)
POTASSIUM BLD-SCNC: 3.5 MMOL/L (ref 3.5–5.2)
PROCALCITONIN SERPL-MCNC: 0.29 NG/ML (ref 0.1–0.25)
PROT SERPL-MCNC: 6.3 G/DL (ref 6–8.5)
PROT UR QL STRIP: NEGATIVE
PROTHROMBIN TIME: 28.7 SECONDS (ref 19.4–28.5)
PROTHROMBIN TIME: 30 SECONDS (ref 19.4–28.5)
RBC # BLD AUTO: 2.92 10*6/MM3 (ref 3.77–5.28)
RHINOVIRUS RNA SPEC NAA+PROBE: NOT DETECTED
RSV RNA NPH QL NAA+NON-PROBE: NOT DETECTED
SODIUM BLD-SCNC: 137 MMOL/L (ref 136–145)
SP GR UR STRIP: 1.02 (ref 1–1.03)
TROPONIN T SERPL-MCNC: <0.01 NG/ML (ref 0–0.03)
UROBILINOGEN UR QL STRIP: NORMAL
WBC NRBC COR # BLD: 2.3 10*3/MM3 (ref 3.4–10.8)
WHOLE BLOOD HOLD SPECIMEN: NORMAL
WHOLE BLOOD HOLD SPECIMEN: NORMAL

## 2019-12-08 PROCEDURE — 0099U HC BIOFIRE FILMARRAY RESP PANEL 1: CPT | Performed by: NURSE PRACTITIONER

## 2019-12-08 PROCEDURE — 25010000002 CEFEPIME PER 500 MG: Performed by: INTERNAL MEDICINE

## 2019-12-08 PROCEDURE — 85610 PROTHROMBIN TIME: CPT | Performed by: INTERNAL MEDICINE

## 2019-12-08 PROCEDURE — 71046 X-RAY EXAM CHEST 2 VIEWS: CPT

## 2019-12-08 PROCEDURE — 99223 1ST HOSP IP/OBS HIGH 75: CPT | Performed by: INTERNAL MEDICINE

## 2019-12-08 PROCEDURE — 84145 PROCALCITONIN (PCT): CPT | Performed by: NURSE PRACTITIONER

## 2019-12-08 PROCEDURE — 99284 EMERGENCY DEPT VISIT MOD MDM: CPT

## 2019-12-08 PROCEDURE — 83880 ASSAY OF NATRIURETIC PEPTIDE: CPT | Performed by: NURSE PRACTITIONER

## 2019-12-08 PROCEDURE — 83605 ASSAY OF LACTIC ACID: CPT

## 2019-12-08 PROCEDURE — 25010000002 CEFEPIME PER 500 MG: Performed by: EMERGENCY MEDICINE

## 2019-12-08 PROCEDURE — P9612 CATHETERIZE FOR URINE SPEC: HCPCS

## 2019-12-08 PROCEDURE — 83735 ASSAY OF MAGNESIUM: CPT | Performed by: NURSE PRACTITIONER

## 2019-12-08 PROCEDURE — 84484 ASSAY OF TROPONIN QUANT: CPT | Performed by: NURSE PRACTITIONER

## 2019-12-08 PROCEDURE — 87040 BLOOD CULTURE FOR BACTERIA: CPT | Performed by: NURSE PRACTITIONER

## 2019-12-08 PROCEDURE — 82550 ASSAY OF CK (CPK): CPT | Performed by: NURSE PRACTITIONER

## 2019-12-08 PROCEDURE — 25010000002 VANCOMYCIN 10 G RECONSTITUTED SOLUTION: Performed by: EMERGENCY MEDICINE

## 2019-12-08 PROCEDURE — 81003 URINALYSIS AUTO W/O SCOPE: CPT | Performed by: NURSE PRACTITIONER

## 2019-12-08 PROCEDURE — 85025 COMPLETE CBC W/AUTO DIFF WBC: CPT | Performed by: NURSE PRACTITIONER

## 2019-12-08 PROCEDURE — 80053 COMPREHEN METABOLIC PANEL: CPT | Performed by: NURSE PRACTITIONER

## 2019-12-08 PROCEDURE — 85610 PROTHROMBIN TIME: CPT | Performed by: NURSE PRACTITIONER

## 2019-12-08 RX ORDER — WARFARIN SODIUM 5 MG/1
5 TABLET ORAL
Status: DISCONTINUED | OUTPATIENT
Start: 2019-12-08 | End: 2019-12-09

## 2019-12-08 RX ORDER — L.ACID,PARA/B.BIFIDUM/S.THERM 8B CELL
1 CAPSULE ORAL DAILY
Status: DISCONTINUED | OUTPATIENT
Start: 2019-12-08 | End: 2019-12-11 | Stop reason: HOSPADM

## 2019-12-08 RX ORDER — POTASSIUM CHLORIDE 20 MEQ/1
40 TABLET, EXTENDED RELEASE ORAL NIGHTLY
Status: DISCONTINUED | OUTPATIENT
Start: 2019-12-08 | End: 2019-12-11 | Stop reason: HOSPADM

## 2019-12-08 RX ORDER — SODIUM CHLORIDE 0.9 % (FLUSH) 0.9 %
10 SYRINGE (ML) INJECTION AS NEEDED
Status: DISCONTINUED | OUTPATIENT
Start: 2019-12-08 | End: 2019-12-11 | Stop reason: HOSPADM

## 2019-12-08 RX ORDER — ATORVASTATIN CALCIUM 20 MG/1
20 TABLET, FILM COATED ORAL NIGHTLY
Status: DISCONTINUED | OUTPATIENT
Start: 2019-12-08 | End: 2019-12-11 | Stop reason: HOSPADM

## 2019-12-08 RX ORDER — SODIUM CHLORIDE 0.9 % (FLUSH) 0.9 %
10 SYRINGE (ML) INJECTION EVERY 12 HOURS SCHEDULED
Status: DISCONTINUED | OUTPATIENT
Start: 2019-12-08 | End: 2019-12-11 | Stop reason: HOSPADM

## 2019-12-08 RX ORDER — POTASSIUM CHLORIDE 20 MEQ/1
60 TABLET, EXTENDED RELEASE ORAL EVERY MORNING
COMMUNITY
End: 2019-12-11 | Stop reason: HOSPADM

## 2019-12-08 RX ORDER — CYANOCOBALAMIN 1000 UG/ML
1000 INJECTION, SOLUTION INTRAMUSCULAR; SUBCUTANEOUS
Status: DISCONTINUED | OUTPATIENT
Start: 2019-12-22 | End: 2019-12-11 | Stop reason: HOSPADM

## 2019-12-08 RX ORDER — TEMAZEPAM 30 MG/1
30 CAPSULE ORAL NIGHTLY PRN
COMMUNITY
End: 2019-12-11 | Stop reason: SDUPTHER

## 2019-12-08 RX ORDER — PREGABALIN 100 MG/1
100 CAPSULE ORAL 3 TIMES DAILY
Status: DISCONTINUED | OUTPATIENT
Start: 2019-12-08 | End: 2019-12-11 | Stop reason: HOSPADM

## 2019-12-08 RX ORDER — TEMAZEPAM 15 MG/1
30 CAPSULE ORAL NIGHTLY PRN
Status: DISCONTINUED | OUTPATIENT
Start: 2019-12-08 | End: 2019-12-11 | Stop reason: HOSPADM

## 2019-12-08 RX ORDER — ONDANSETRON 8 MG/1
8 TABLET, ORALLY DISINTEGRATING ORAL EVERY 8 HOURS PRN
COMMUNITY
End: 2020-08-28 | Stop reason: SDUPTHER

## 2019-12-08 RX ORDER — ONDANSETRON 4 MG/1
8 TABLET, ORALLY DISINTEGRATING ORAL EVERY 8 HOURS PRN
Status: DISCONTINUED | OUTPATIENT
Start: 2019-12-08 | End: 2019-12-11 | Stop reason: HOSPADM

## 2019-12-08 RX ORDER — FAMOTIDINE 20 MG/1
40 TABLET, FILM COATED ORAL DAILY
Status: DISCONTINUED | OUTPATIENT
Start: 2019-12-09 | End: 2019-12-11 | Stop reason: HOSPADM

## 2019-12-08 RX ORDER — CYANOCOBALAMIN 1000 UG/ML
1000 INJECTION, SOLUTION INTRAMUSCULAR; SUBCUTANEOUS
COMMUNITY

## 2019-12-08 RX ORDER — OXYCODONE HYDROCHLORIDE 5 MG/1
10 TABLET ORAL EVERY 8 HOURS PRN
Status: DISCONTINUED | OUTPATIENT
Start: 2019-12-08 | End: 2019-12-11 | Stop reason: HOSPADM

## 2019-12-08 RX ORDER — HYDROCHLOROTHIAZIDE 25 MG/1
25 TABLET ORAL DAILY
Status: DISCONTINUED | OUTPATIENT
Start: 2019-12-08 | End: 2019-12-08

## 2019-12-08 RX ORDER — TRIAMTERENE AND HYDROCHLOROTHIAZIDE 37.5; 25 MG/1; MG/1
1 TABLET ORAL DAILY
Status: DISCONTINUED | OUTPATIENT
Start: 2019-12-08 | End: 2019-12-11 | Stop reason: HOSPADM

## 2019-12-08 RX ORDER — SODIUM CHLORIDE 0.9 % (FLUSH) 0.9 %
10 SYRINGE (ML) INJECTION AS NEEDED
Status: DISCONTINUED | OUTPATIENT
Start: 2019-12-08 | End: 2019-12-09 | Stop reason: SDUPTHER

## 2019-12-08 RX ORDER — TRIAMTERENE AND HYDROCHLOROTHIAZIDE 37.5; 25 MG/1; MG/1
1 CAPSULE ORAL EVERY MORNING
COMMUNITY
End: 2020-01-02

## 2019-12-08 RX ORDER — ACETAMINOPHEN 500 MG
1000 TABLET ORAL EVERY 6 HOURS PRN
COMMUNITY

## 2019-12-08 RX ORDER — POTASSIUM CHLORIDE 20 MEQ/1
40 TABLET, EXTENDED RELEASE ORAL EVERY MORNING
Status: DISCONTINUED | OUTPATIENT
Start: 2019-12-09 | End: 2019-12-11 | Stop reason: HOSPADM

## 2019-12-08 RX ORDER — PROMETHAZINE HYDROCHLORIDE 25 MG/1
25 TABLET ORAL EVERY 6 HOURS PRN
Status: DISCONTINUED | OUTPATIENT
Start: 2019-12-08 | End: 2019-12-11 | Stop reason: HOSPADM

## 2019-12-08 RX ORDER — VANCOMYCIN 1.75 GRAM/500 ML IN 0.9 % SODIUM CHLORIDE INTRAVENOUS
20 ONCE
Status: COMPLETED | OUTPATIENT
Start: 2019-12-08 | End: 2019-12-08

## 2019-12-08 RX ORDER — ACETAMINOPHEN 500 MG
1000 TABLET ORAL EVERY 6 HOURS PRN
Status: DISCONTINUED | OUTPATIENT
Start: 2019-12-08 | End: 2019-12-11 | Stop reason: HOSPADM

## 2019-12-08 RX ORDER — WARFARIN SODIUM 6 MG/1
6 TABLET ORAL
COMMUNITY
End: 2019-12-11 | Stop reason: HOSPADM

## 2019-12-08 RX ORDER — FAMOTIDINE 40 MG/1
40 TABLET, FILM COATED ORAL DAILY
COMMUNITY
End: 2020-03-03

## 2019-12-08 RX ORDER — MAGNESIUM OXIDE 400 MG/1
400 TABLET ORAL 2 TIMES DAILY
COMMUNITY
End: 2020-06-01

## 2019-12-08 RX ORDER — POTASSIUM CHLORIDE 20 MEQ/1
40 TABLET, EXTENDED RELEASE ORAL NIGHTLY
COMMUNITY
End: 2019-12-30

## 2019-12-08 RX ADMIN — POTASSIUM CHLORIDE 40 MEQ: 1500 TABLET, EXTENDED RELEASE ORAL at 20:13

## 2019-12-08 RX ADMIN — Medication 1 CAPSULE: at 17:07

## 2019-12-08 RX ADMIN — TEMAZEPAM 30 MG: 15 CAPSULE ORAL at 20:13

## 2019-12-08 RX ADMIN — WARFARIN SODIUM 5 MG: 5 TABLET ORAL at 17:07

## 2019-12-08 RX ADMIN — PREGABALIN 100 MG: 100 CAPSULE ORAL at 17:07

## 2019-12-08 RX ADMIN — PREGABALIN 100 MG: 100 CAPSULE ORAL at 20:13

## 2019-12-08 RX ADMIN — CEFEPIME HYDROCHLORIDE 2 G: 2 INJECTION, POWDER, FOR SOLUTION INTRAVENOUS at 20:13

## 2019-12-08 RX ADMIN — ACETAMINOPHEN 1000 MG: 500 TABLET, FILM COATED ORAL at 20:13

## 2019-12-08 RX ADMIN — TRIAMTERENE AND HYDROCHLOROTHIAZIDE 1 TABLET: 37.5; 25 TABLET ORAL at 17:07

## 2019-12-08 RX ADMIN — SODIUM CHLORIDE 1710 ML: 900 INJECTION, SOLUTION INTRAVENOUS at 10:40

## 2019-12-08 RX ADMIN — Medication 10 ML: at 22:00

## 2019-12-08 RX ADMIN — SERTRALINE HYDROCHLORIDE 50 MG: 50 TABLET ORAL at 17:07

## 2019-12-08 RX ADMIN — ATORVASTATIN CALCIUM 20 MG: 20 TABLET, FILM COATED ORAL at 20:13

## 2019-12-08 RX ADMIN — VANCOMYCIN HYDROCHLORIDE 1750 MG: 10 INJECTION, POWDER, LYOPHILIZED, FOR SOLUTION INTRAVENOUS at 14:04

## 2019-12-08 RX ADMIN — CEFEPIME HYDROCHLORIDE 2 G: 2 INJECTION, POWDER, FOR SOLUTION INTRAVENOUS at 10:53

## 2019-12-08 NOTE — ED PROVIDER NOTES
Subjective   Chief complaint chills weakness fever    History of present illness 64-year-old female with a history of ovarian cancer stage IV currently on chemotherapy which she received about 2 weeks ago complains of a couple day history of weakness and chills and aching all over and now she is developed a fever up to 102.  Some cough is been nonproductive.  No vomiting or diarrhea.  No ill exposures or foreign travels or current antibiotic use.  No urinary problems.  Symptoms are moderate severe continuous aching throbbing in nature worse with movement better with rest ongoing for the last couple days.          Review of Systems   Constitutional: Positive for chills and fever.   HENT: Negative for congestion and sinus pressure.    Eyes: Negative for photophobia and visual disturbance.   Respiratory: Positive for cough. Negative for chest tightness and shortness of breath.    Cardiovascular: Negative for chest pain and leg swelling.   Gastrointestinal: Negative for abdominal pain and vomiting.   Endocrine: Negative for cold intolerance and heat intolerance.   Genitourinary: Negative for difficulty urinating and dysuria.   Musculoskeletal: Positive for myalgias. Negative for arthralgias.   Skin: Negative for color change and pallor.   Neurological: Negative for dizziness and light-headedness.   Psychiatric/Behavioral: Negative for agitation and behavioral problems.       Past Medical History:   Diagnosis Date   • Anemia 2013   • Cervical disc disorder 2013    Herniated disc, pinched nerve   • Clotting disorder (CMS/HCC) 2013    Low platelets from chemo   • Colon polyp 2013   • Deep vein thrombosis (CMS/HCC) 2013   • Diverticulosis 2013   • GERD (gastroesophageal reflux disease) 2016   • HL (hearing loss) 2016    I need hearing aids   • Hyperlipidemia 2013    Need my cholesterol rechecked   • Hypertension    • Joint pain 2013    Shoulder pain, torn rotator cuff   • Low back pain 2013   • Neuromuscular disorder  (CMS/[facility]) 2015    Shingles, pinched nerves in my neck   • Osteopenia 2014   • Osteoporosis    • Ovarian cancer (CMS/[facility])    • Pneumonia 2010    Have had it several times   • Spinal stenosis 2013    Cervical   • Urinary tract infection 2013    Have had several utis       Allergies   Allergen Reactions   • Morphine Nausea Only and Hives   • Penicillin V Potassium Rash   • Sulfa Antibiotics Rash   • Levaquin [Levofloxacin] Nausea Only and Dizziness     When taken with Coumadin   • Amoxicillin Rash   • Norco [Hydrocodone-Acetaminophen] Rash       Past Surgical History:   Procedure Laterality Date   • COLON SURGERY     • COLONOSCOPY     • EXPLORATORY LAPAROTOMY     • HERNIA REPAIR     • HYSTERECTOMY     • OOPHORECTOMY         Family History   Problem Relation Age of Onset   • Hypertension Mother    • Cancer Father    • Hypertension Father    • Hypertension Sister    • Hypertension Brother    • Stroke Brother        Social History     Socioeconomic History   • Marital status:      Spouse name: Not on file   • Number of children: Not on file   • Years of education: Not on file   • Highest education level: Not on file   Tobacco Use   • Smoking status: Never Smoker   • Smokeless tobacco: Never Used   Substance and Sexual Activity   • Alcohol use: No     Frequency: Never   • Drug use: No   • Sexual activity: Not Currently     Partners: Male     Birth control/protection: Surgical     Prior to Admission medications    Medication Sig Start Date End Date Taking? Authorizing Provider   Acetaminophen 500 MG coapsule Take 100 mg by mouth 4 (Four) Times a Day. As needed 4/5/17   Ella Andres MD   atorvastatin (LIPITOR) 20 MG tablet Take 20 mg by mouth every night at bedtime. 5/11/19   Ella Andres MD   cyanocobalamin 1000 MCG/ML injection INJECT 1 ML INTRAMUSCULARLY EVERY MONTH 9/18/19   Ella Andres MD   famotidine (PEPCID) 40 MG tablet TAKE 1 TABLET BY MOUTH EVERY MORNING BEFORE BREAKFAST 8/9/19    "Valencia Cadena APRN   hydrochlorothiazide (HYDRODIURIL) 25 MG tablet Take 25 mg by mouth Daily. 4/5/17   Ella Andres MD   KLOR-CON 20 MEQ CR tablet TAKE 3 TABLETS BY MOUTH EVERY MORNING AND TAKE 2 TABLETS AT BEDTIME 9/16/19   Amara Thomson FNP   lisinopril (PRINIVIL,ZESTRIL) 20 MG tablet Take 20 mg by mouth Daily.    Ella Andres MD   LYRICA 100 MG capsule Take 1 capsule by mouth 3 (Three) Times a Day. 10/7/19   Fito Ram MD   magnesium oxide (MAG-OX) 400 MG tablet TAKE 1 TABLET BY MOUTH TWICE A DAY 11/27/19   Daryn Tay MD   ondansetron ODT (ZOFRAN-ODT) 8 MG disintegrating tablet PLACE 1 TABLET IN MOUTH TO DISSOLVE EVERY 8 HOURS AS NEEDED FOR NAUSEA 7/17/19   Valencia Cadena APRN   oxyCODONE (ROXICODONE) 10 MG tablet Take 1 tablet by mouth 3 (Three) Times a Day As Needed for Moderate Pain . 10/7/19   Fito Ram MD   promethazine (PHENERGAN) 25 MG tablet Take 1 tablet by mouth Every 6 (Six) Hours As Needed for Nausea or Vomiting. 8/30/19   Akil Chisholm MD   sertraline (ZOLOFT) 50 MG tablet TAKE 1 TABLET BY MOUTH EVERY EVENING BEFORE BED 7/18/19   Valencia Cadena APRN   Syringe/Needle, Disp, (B-D 3CC LUER-JESSICA SYR 23GX1\") 23G X 1\" 3 ML misc BD LUER-JESSICA SYRINGE 23G X 1\" 3 ML 12/3/18   Ella Andres MD   temazepam (RESTORIL) 30 MG capsule TAKE 1 CAPSULE BY MOUTH ONCE A DAY AT BEDTIME AS NEEDED FOR SLEEP 11/8/19   Noemy Queen MD   triamterene-hydrochlorothiazide (DYAZIDE) 37.5-25 MG per capsule TAKE 1 CAPSULE BY MOUTH EVERY DAY 7/10/19   Amara Thomson FNP   valACYclovir (VALTREX) 500 MG tablet Take 1,000 mg by mouth Daily.    Ella Andres MD   WARFARIN SODIUM PO Take 5 mg by mouth Daily. 5.5mg DAILY 4/24/19   Provider, Ella, MD           Objective   Physical Exam  64-year-old awake alert no acute distress HEENT extraocular muscles intact pupils equal and reactive no photophobia disks are sharp mouth is clear neck is supple " no adenopathy no meningeal signs no JVD lungs clear no retractions no use of accessories heart regular without murmur abdomen soft without tenderness no masses extremities pulses are equal foot upper and lower extremities no edema cords or Homans sign or evidence of DVT.  Patient's awake alert follows commands motor strength normal without focal weakness.  Procedures           ED Course      Results for orders placed or performed during the hospital encounter of 12/08/19   Comprehensive Metabolic Panel   Result Value Ref Range    Glucose 92 65 - 99 mg/dL    BUN 14 8 - 23 mg/dL    Creatinine 0.84 0.57 - 1.00 mg/dL    Sodium 137 136 - 145 mmol/L    Potassium 3.5 3.5 - 5.2 mmol/L    Chloride 102 98 - 107 mmol/L    CO2 26.0 22.0 - 29.0 mmol/L    Calcium 8.5 (L) 8.6 - 10.5 mg/dL    Total Protein 6.3 6.0 - 8.5 g/dL    Albumin 3.70 3.50 - 5.20 g/dL    ALT (SGPT) 7 1 - 33 U/L    AST (SGOT) 17 1 - 32 U/L    Alkaline Phosphatase 119 (H) 39 - 117 U/L    Total Bilirubin 0.2 0.2 - 1.2 mg/dL    eGFR Non African Amer 68 >60 mL/min/1.73    Globulin 2.6 gm/dL    A/G Ratio 1.4 g/dL    BUN/Creatinine Ratio 16.7 7.0 - 25.0    Anion Gap 9.0 5.0 - 15.0 mmol/L   BNP   Result Value Ref Range    proBNP 138.3 5.0 - 900.0 pg/mL   Troponin   Result Value Ref Range    Troponin T <0.010 0.000 - 0.030 ng/mL   Magnesium   Result Value Ref Range    Magnesium 1.3 (L) 1.6 - 2.4 mg/dL   CK   Result Value Ref Range    Creatine Kinase 57 20 - 180 U/L   Procalcitonin   Result Value Ref Range    Procalcitonin 0.29 (H) 0.10 - 0.25 ng/mL   Protime-INR   Result Value Ref Range    Protime 28.7 (H) 19.4 - 28.5 Seconds    INR 3.05 (H) 2.00 - 3.00   CBC Auto Differential   Result Value Ref Range    WBC 2.30 (L) 3.40 - 10.80 10*3/mm3    RBC 2.92 (L) 3.77 - 5.28 10*6/mm3    Hemoglobin 10.4 (L) 12.0 - 15.9 g/dL    Hematocrit 31.0 (L) 34.0 - 46.6 %    .3 (H) 79.0 - 97.0 fL    MCH 35.5 (H) 26.6 - 33.0 pg    MCHC 33.4 31.5 - 35.7 g/dL    RDW 16.7 (H) 12.3 -  15.4 %    RDW-SD 62.6 (H) 37.0 - 54.0 fl    MPV 8.8 6.0 - 12.0 fL    Platelets 59 (L) 140 - 450 10*3/mm3    Neutrophil % 69.6 42.7 - 76.0 %    Lymphocyte % 20.7 19.6 - 45.3 %    Monocyte % 7.4 5.0 - 12.0 %    Eosinophil % 2.0 0.3 - 6.2 %    Basophil % 0.3 0.0 - 1.5 %    Neutrophils, Absolute 1.60 (L) 1.70 - 7.00 10*3/mm3    Lymphocytes, Absolute 0.50 (L) 0.70 - 3.10 10*3/mm3    Monocytes, Absolute 0.20 0.10 - 0.90 10*3/mm3    Eosinophils, Absolute 0.00 0.00 - 0.40 10*3/mm3    Basophils, Absolute 0.00 0.00 - 0.20 10*3/mm3    nRBC 0.2 0.0 - 0.2 /100 WBC   POC Lactate   Result Value Ref Range    Lactate 0.5 0.5 - 2.0 mmol/L     Xr Chest 2 View    Result Date: 12/8/2019   1. No active cardiopulmonary disease 2. Left-sided [id number] remains in good position. 3. No significant change from 08/30/2019  Electronically Signed By-Parminder Elmore Jr. On:12/8/2019 11:24 AM This report was finalized on [id number] by  Parminder Elmore Jr., .    Medications   sodium chloride 0.9 % flush 10 mL (has no administration in time range)   sodium chloride 0.9% - IBW for BMI > 30 bolus 1,710 mL (1,710 mL Intravenous New Bag 12/8/19 1040)   ceFEPime (MAXIPIME) in SWFI 2g/10ml IV PUSH syringe (2 g Intravenous Given 12/8/19 1053)                       No data recorded                        MDM  Number of Diagnoses or Management Options  Neutropenic fever (CMS/HCC):   Diagnosis management comments: Medical decision making.  Patient IV established placed on sepsis protocol labs including lactate and blood cultures obtained.  Patient's white count was 2.3.  Platelet count was only 59,000 patient's INR was 3.05.  Magnesium 1.3 otherwise chemistries unremarkable cultures are pending.  She had been started on IV normal saline 30 mL/kg and started on Maxipime.  On repeat exam she was resting currently no distress.  The patient has been running a fever at home with 102 she is on chemotherapy.  She will be placed in the hospital on her antibiotics  pending cultures.  Hospitalist paged stable otherwise unremarkable improved ER course.      Final diagnoses:   Neutropenic fever (CMS/HCC)              Partha Sheets MD  12/08/19 8197

## 2019-12-08 NOTE — ED NOTES
Pt is being treated for ovarian cx with chemo. Last treatment 2 weeks ago. Last night daughter reports rash on face and fever to pt last night and she has been sleeping more.      Leeann Dang, BAUTISTA  12/08/19 9904

## 2019-12-08 NOTE — ASSESSMENT & PLAN NOTE
Continue Coumadin therapy with goal INR 2-3  As well she has a Port on left chest - need chronic anticoagulation

## 2019-12-08 NOTE — PROGRESS NOTES
"Pharmacy Dosing Service  Antibiotic  Vancomycin     64 y.o.female admitted with sepsis/neutropenic fever. Pharmacy to dose vancomycin.    Assessment/Plan  1. Day #1 vancomycin: 1.75 gm (~20 mg/kg ABW) IV x 1 dose, then 1 gm (~15 mg/kg DBW) IV q 12hr. Will obtain pharmacokinetic levels prior to 5th dose with goal trough 10-20 mcg/mL and goal -600 mcg*h/mL.    2. Day #1 Cefepime: 2 gm IV q8h for CrCl > 60 ml/min.     3. Will continue to monitor renal function, cultures and sensitivities, and patient clinical status.      Relevant clinical data and objective history reviewed:  165.1 cm (65\")   82.8 kg (182 lb 8.7 oz)   Ideal body weight: 57 kg (125 lb 10.6 oz)  Adjusted ideal body weight: 67.3 kg (148 lb 6.6 oz)  Body mass index is 30.38 kg/m².    Creatinine   Date Value Ref Range Status   12/08/2019 0.84 0.57 - 1.00 mg/dL Final   11/22/2019 0.90 0.60 - 1.30 mg/dL Final     Comment:     Serial Number: [id number]:  [id number]   11/22/2019 0.91 0.57 - 1.00 mg/dL Final   10/25/2019 0.90 0.60 - 1.30 mg/dL Final     Comment:     Serial Number: [id number]:  [id number]   10/25/2019 0.89 0.57 - 1.00 mg/dL Final   09/27/2019 0.80 0.60 - 1.30 mg/dL Final     Comment:     Serial Number: [id number]:  [id number]     Estimated Creatinine Clearance: 71.9 mL/min (by C-G formula based on SCr of 0.84 mg/dL).  No intake/output data recorded.          WBC   Date Value Ref Range Status   12/08/2019 2.30 (L) 3.40 - 10.80 10*3/mm3 Final     Comment:     Result checked    12/06/2019 4.02 3.40 - 10.80 10*3/mm3 Final   11/27/2019 17.08 (H) 3.40 - 10.80 10*3/mm3 Final     Temperature    12/08/19 0949 12/08/19 1212   Temp: 99.1 °F (37.3 °C) 98.6 °F (37 °C)     Baseline culture/source/susceptibility:  Microbiology Results (last 10 days)       Procedure Component Value - Date/Time    Respiratory Panel, PCR - Swab, Nasopharynx [[id number]]  (Normal) Collected:  12/08/19 1037    Lab Status:  Final result Specimen:  Swab from Nasopharynx Updated: "  12/08/19 1156     ADENOVIRUS, PCR Not Detected     Coronavirus 229E Not Detected     Coronavirus HKU1 Not Detected     Coronavirus NL63 Not Detected     Coronavirus OC43 Not Detected     Human Metapneumovirus Not Detected     Human Rhinovirus/Enterovirus Not Detected     Influenza B PCR Not Detected     Parainfluenza Virus 1 Not Detected     Parainfluenza Virus 2 Not Detected     Parainfluenza Virus 3 Not Detected     Parainfluenza Virus 4 Not Detected     Bordetella pertussis pcr Not Detected     Influenza A H1 2009 PCR Not Detected     Chlamydophila pneumoniae PCR Not Detected     Mycoplasma pneumo by PCR Not Detected     Influenza A PCR Not Detected     Influenza A H3 Not Detected     Influenza A H1 Not Detected     RSV, PCR Not Detected             Anti-Infectives (From admission, onward)      Ordered     Dose/Rate Route Frequency Start Stop    12/08/19 1552  !Vancomycin Level Draw Needed     Ordering Provider:  Jacky Barraza MD     Does not apply Once 12/10/19 1400      12/08/19 1552  !Vancomycin Level Draw Needed     Ordering Provider:  Jacky Barraza MD     Does not apply Once 12/10/19 0700      12/08/19 1552  vancomycin (VANCOCIN) 1,000 mg in sodium chloride 0.9 % 250 mL IVPB     Ordering Provider:  Jacky Barraza MD    15 mg/kg × 67.3 kg (Adjusted)  over 60 Minutes Intravenous Every 12 Hours 12/09/19 0300 12/14/19 0259    12/08/19 1545  cefepime 2 gm IVPB in 100 ml NS (MBP)     Ordering Provider:  Jacky Barraza MD    2 g  over 4 Hours Intravenous Every 8 Hours 12/08/19 2000 12/13/19 1959    12/08/19 1545  Pharmacy to dose vancomycin     Ordering Provider:  Jacky Barraza MD     Does not apply Continuous PRN 12/08/19 1544 12/13/19 1543    12/08/19 1148  vancomycin IVPB 1750 mg in 0.9% Sodium Chloride (premix) 500 mL     Ordering Provider:  Partha Sheets MD    20 mg/kg × 82.8 kg Intravenous Once 12/08/19 1150 12/08/19 1404    12/08/19 1025  ceFEPime (MAXIPIME) in SWFI 2g/10ml IV PUSH  syringe     Ordering Provider:  Partha Sheets MD    2 g  over 5 Minutes Intravenous Once 12/08/19 1027 12/08/19 1058             Zonia Patel PharmD  12/08/19 3:53 PM

## 2019-12-08 NOTE — ASSESSMENT & PLAN NOTE
Ovarian cancer, initially diagnosed in 1996  Surgery/Chemotherapy dose at that time  Recurrent  Disease 4 years ago.  Chemo started - total 14 cycles till current date.  Good general condition.

## 2019-12-08 NOTE — ASSESSMENT & PLAN NOTE
Patient had fever and chills preadmission but no clear source of infection identified.  Respiratory virus panel negative.  Blood cultures x2 negative and pending.  Initially started on vancomycin and cefepime and the vancomycin will be discontinued at this time.

## 2019-12-08 NOTE — H&P
Psychiatric   HISTORY AND PHYSICAL    Patient Name: Tahira Zimmerman  : 1955  MRN: [id number]  Primary Care Physician: Noemy Queen MD  Date of admission: 2019      Subjective   Subjective     Chief Complaint: fever    HPI:   Tahira Zimmerman is a 64 y.o. female patient who has been taking chemotherapy since  after discovering recurrent ovarian cancer.  Initially she had the disease in . She has been cancer free almost 20 years. So far she has got 14 cycles of chemotherapy. The last one was 2 weeks ago. She came to ER due to fever and chills that started couple of days ago. She has some cough but no phlegm. CXR is clean. No urinary Symptoms. No diarrhea.   WBC is 2.3.   She doesn't appear to be septic. Her general condition is good.  Vanco/Cefepime was started in ER and it will be continued.      Review of Systems   Constitutional: Positive for fatigue and fever.   HENT: Negative.    Eyes: Negative.    Respiratory: Positive for cough.    Cardiovascular: Negative.    Gastrointestinal: Negative.    Endocrine: Negative.    Genitourinary: Negative.    Musculoskeletal: Negative.    Skin: Negative.    Allergic/Immunologic: Negative.    Neurological: Negative.    Hematological: Negative.    Psychiatric/Behavioral: Negative.           Personal History     Past Medical History:   Diagnosis Date   • Anemia    • Cervical disc disorder     Herniated disc, pinched nerve   • Clotting disorder (CMS/HCC)     Low platelets from chemo   • Colon polyp    • Deep vein thrombosis (CMS/HCC)    • Diverticulosis    • GERD (gastroesophageal reflux disease) 2016   • HL (hearing loss) 2016    I need hearing aids   • Hyperlipidemia 2013    Need my cholesterol rechecked   • Hypertension    • Joint pain 2013    Shoulder pain, torn rotator cuff   • Low back pain    • Neuromuscular disorder (CMS/HCC)     Shingles, pinched nerves in my neck   • Osteopenia    • Osteoporosis     • Ovarian cancer (CMS/HCC)    • Pneumonia 2010    Have had it several times   • Spinal stenosis 2013    Cervical   • Urinary tract infection 2013    Have had several utis       Past Surgical History:   Procedure Laterality Date   • COLON SURGERY     • COLONOSCOPY     • EXPLORATORY LAPAROTOMY     • HERNIA REPAIR     • HYSTERECTOMY     • OOPHORECTOMY         Family History: family history includes Cancer in her father; Hypertension in her brother, father, mother, and sister; Stroke in her brother. Otherwise pertinent FHx was reviewed and unremarkable.     Social History:  reports that she has never smoked. She has never used smokeless tobacco. She reports that she does not drink alcohol or use drugs.      Medications:  Medications Prior to Admission   Medication Sig Dispense Refill Last Dose   • acetaminophen (TYLENOL) 500 MG tablet Take 1,000 mg by mouth Every 6 (Six) Hours As Needed for Mild Pain .      • cyanocobalamin 1000 MCG/ML injection Inject 1,000 mcg into the appropriate muscle as directed by prescriber Every 28 (Twenty-Eight) Days.   11/24/2019   • famotidine (PEPCID) 40 MG tablet Take 40 mg by mouth Daily.      • LYRICA 100 MG capsule Take 1 capsule by mouth 3 (Three) Times a Day. 90 capsule 2 12/8/2019 at Unknown time   • magnesium oxide (MAG-OX) 400 MG tablet Take 400 mg by mouth 2 (Two) Times a Day.      • ondansetron ODT (ZOFRAN-ODT) 8 MG disintegrating tablet Take 8 mg by mouth Every 8 (Eight) Hours As Needed for Nausea or Vomiting.      • oxyCODONE (ROXICODONE) 10 MG tablet Take 1 tablet by mouth 3 (Three) Times a Day As Needed for Moderate Pain . 90 tablet 0 12/8/2019 at Unknown time   • potassium chloride (K-DUR,KLOR-CON) 20 MEQ CR tablet Take 60 mEq by mouth Every Morning.      • potassium chloride (K-DUR,KLOR-CON) 20 MEQ CR tablet Take 40 mEq by mouth Every Night.      • sertraline (ZOLOFT) 50 MG tablet Take 50 mg by mouth Every Evening.      • temazepam (RESTORIL) 30 MG capsule Take 30 mg by  mouth At Night As Needed for Sleep.      • triamterene-hydrochlorothiazide (DYAZIDE) 37.5-25 MG per capsule Take 1 capsule by mouth Every Morning.      • warfarin (COUMADIN) 6 MG tablet Take 6 mg by mouth Daily.      • atorvastatin (LIPITOR) 20 MG tablet Take 20 mg by mouth every night at bedtime.  3 Taking   • hydrochlorothiazide (HYDRODIURIL) 25 MG tablet Take 25 mg by mouth Daily.   Taking   • promethazine (PHENERGAN) 25 MG tablet Take 1 tablet by mouth Every 6 (Six) Hours As Needed for Nausea or Vomiting. 12 tablet 0 Taking           Allergies   Allergen Reactions   • Morphine Nausea Only and Hives   • Penicillin V Potassium Rash   • Sulfa Antibiotics Rash   • Levaquin [Levofloxacin] Nausea Only and Dizziness     When taken with Coumadin   • Amoxicillin Rash   • Norco [Hydrocodone-Acetaminophen] Rash       Objective   Objective     Vital Signs:   Temp:  [98.6 °F (37 °C)-99.1 °F (37.3 °C)] 98.6 °F (37 °C)  Heart Rate:  [62-72] 62  Resp:  [15-20] 18  BP: (120-127)/(65-80) 120/80        Physical Exam   Constitutional: She is oriented to person, place, and time. She appears well-developed.   HENT:   Head: Normocephalic and atraumatic.   Eyes: Pupils are equal, round, and reactive to light. EOM are normal.   Neck: Normal range of motion. Neck supple.   Cardiovascular: Normal rate and regular rhythm.   Pulmonary/Chest: Effort normal and breath sounds normal.   Abdominal: Soft. Bowel sounds are normal.   Musculoskeletal: Normal range of motion.   Neurological: She is alert and oriented to person, place, and time.   Skin: Skin is warm and dry.   Psychiatric: She has a normal mood and affect. Her behavior is normal.          Results Reviewed:  I have personally reviewed most recent lab results and agree with findings, most notably: .      Results from last 7 days   Lab Units 12/08/19  1039   WBC 10*3/mm3 2.30*   HEMOGLOBIN g/dL 10.4*   HEMATOCRIT % 31.0*   PLATELETS 10*3/mm3 59*   INR  3.05*     Results from last 7 days    Lab Units 12/08/19  1042 12/08/19  1039   SODIUM mmol/L  --  137   POTASSIUM mmol/L  --  3.5   CHLORIDE mmol/L  --  102   CO2 mmol/L  --  26.0   BUN mg/dL  --  14   CREATININE mg/dL  --  0.84   GLUCOSE mg/dL  --  92   CALCIUM mg/dL  --  8.5*   ALT (SGPT) U/L  --  7   AST (SGOT) U/L  --  17   TROPONIN T ng/mL  --  <0.010   PROBNP pg/mL  --  138.3   LACTATE mmol/L 0.5  --    PROCALCITONIN ng/mL  --  0.29*     Estimated Creatinine Clearance: 71.9 mL/min (by C-G formula based on SCr of 0.84 mg/dL).  Brief Urine Lab Results  (Last result in the past 365 days)      Color   Clarity   Blood   Leuk Est   Nitrite   Protein   CREAT   Urine HCG        12/08/19 1140 Yellow Clear Negative Negative Negative Negative             Imaging Results (Last 24 Hours)     Procedure Component Value Units Date/Time    XR Chest 2 View [[id number]] Collected:  12/08/19 1123     Updated:  12/08/19 1126    Narrative:       DATE OF EXAM:  12/8/2019 11:17 AM     PROCEDURE:  XR CHEST 2 VW-     INDICATIONS:  CHF/COPD Protocol patient's a 64-year-old female with fever chills and  headache     COMPARISON:  Study of 08/30/2019     TECHNIQUE:   Two radiologic views of the chest , PA and lateral were obtained.     FINDINGS:  Today's study of the chest was obtained on 12/08/2019 11:20 AM.     The heart size is normal. A left Apivjs-w-Tkgm is present with tip in  the superior vena cava. The lung fields are clear. The diaphragmatic  surfaces are smooth. The bony structures demonstrate only degenerative  change.        Impression:          1. No active cardiopulmonary disease  2. Left-sided Lnanip-p-Xjaj remains in good position.  3. No significant change from 08/30/2019     Electronically Signed By-Parminder Elmore Jr. On:12/8/2019 11:24 AM  This report was finalized on [id number] by  Parminder Elmore Jr., .        Results for orders placed during the hospital encounter of 10/01/19   Adult Transthoracic Echo Complete W/ Cont if Necessary Per Protocol     Narrative · Left atrial cavity size is borderline dilated.       Normal LV size and contractility EF of 60-65%  Normal RV size  Borderline left atrial enlargement  Aortic valve, mitral valve, tricuspid valve appears structurally normal,   no significant regurgitation seen.  No pericardial effusion seen.  Proximal aorta appears normal in size.         Assessment/Plan   Assessment / Plan     Brief Patient Summary:  Tahira Zimmerman is a 64 y.o. female who admitted for the evaluation of neutropenic fever.    Active Hospital Problems:  Neutropenic fever (CMS/HCC)- (present on admission)   Known recurrent ovarian cancer  14 cycles of Chemotherapy at this moment  Fever chills.  No clear source of infection clinically  WBC 2.3  Vanco/Cefepime started    Oncology consult in AM  Consider ID consult if fever not subsided soon.    Malignant neoplasm of right ovary (CMS/HCC)- (present on admission)   Ovarian cancer, initially diagnosed in 1996  Surgery/Chemotherapy dose at that time  Recurrent  Disease 4 years ago.  Chemo started - total 14 cycles till current date.  Good general condition.    Left upper extremity deep vein thrombosis (CMS/HCC)- (present on admission)  She is taking on Coumadin.  As well she has a Port on left chest - need chronic anticoagulation                 DVT prophylaxis:  She is on Coumadin    CODE STATUS:    Code Status and Medical Interventions:   Ordered at: 12/08/19 1537     Level Of Support Discussed With:    Patient     Code Status:    CPR     Medical Interventions (Level of Support Prior to Arrest):    Full       Admission Status:  I believe this patient meets inpatient criteria.    Electronically signed by Jacky Barraza MD, 12/08/19, 3:52 PM.

## 2019-12-09 ENCOUNTER — TELEPHONE (OUTPATIENT)
Dept: FAMILY MEDICINE CLINIC | Facility: CLINIC | Age: 64
End: 2019-12-09

## 2019-12-09 DIAGNOSIS — F51.01 PRIMARY INSOMNIA: Primary | ICD-10-CM

## 2019-12-09 LAB
ANION GAP SERPL CALCULATED.3IONS-SCNC: 9 MMOL/L (ref 5–15)
BUN BLD-MCNC: 11 MG/DL (ref 8–23)
BUN/CREAT SERPL: 15.7 (ref 7–25)
CALCIUM SPEC-SCNC: 8.6 MG/DL (ref 8.6–10.5)
CHLORIDE SERPL-SCNC: 107 MMOL/L (ref 98–107)
CO2 SERPL-SCNC: 26 MMOL/L (ref 22–29)
CREAT BLD-MCNC: 0.7 MG/DL (ref 0.57–1)
DEPRECATED RDW RBC AUTO: 63.9 FL (ref 37–54)
ERYTHROCYTE [DISTWIDTH] IN BLOOD BY AUTOMATED COUNT: 16.9 % (ref 12.3–15.4)
GFR SERPL CREATININE-BSD FRML MDRD: 84 ML/MIN/1.73
GLUCOSE BLD-MCNC: 81 MG/DL (ref 65–99)
HCT VFR BLD AUTO: 28.1 % (ref 34–46.6)
HGB BLD-MCNC: 9.4 G/DL (ref 12–15.9)
INR PPP: 4.26 (ref 2–3)
MAGNESIUM SERPL-MCNC: 1.4 MG/DL (ref 1.6–2.4)
MCH RBC QN AUTO: 35.5 PG (ref 26.6–33)
MCHC RBC AUTO-ENTMCNC: 33.4 G/DL (ref 31.5–35.7)
MCV RBC AUTO: 106.5 FL (ref 79–97)
PLATELET # BLD AUTO: 54 10*3/MM3 (ref 140–450)
PMV BLD AUTO: 9.4 FL (ref 6–12)
POTASSIUM BLD-SCNC: 3.6 MMOL/L (ref 3.5–5.2)
PROTHROMBIN TIME: 40 SECONDS (ref 19.4–28.5)
RBC # BLD AUTO: 2.64 10*6/MM3 (ref 3.77–5.28)
SODIUM BLD-SCNC: 142 MMOL/L (ref 136–145)
WBC NRBC COR # BLD: 2 10*3/MM3 (ref 3.4–10.8)

## 2019-12-09 PROCEDURE — 83735 ASSAY OF MAGNESIUM: CPT | Performed by: INTERNAL MEDICINE

## 2019-12-09 PROCEDURE — 25010000002 VANCOMYCIN 1 G RECONSTITUTED SOLUTION 1 EACH VIAL: Performed by: INTERNAL MEDICINE

## 2019-12-09 PROCEDURE — 85610 PROTHROMBIN TIME: CPT | Performed by: INTERNAL MEDICINE

## 2019-12-09 PROCEDURE — 80048 BASIC METABOLIC PNL TOTAL CA: CPT | Performed by: INTERNAL MEDICINE

## 2019-12-09 PROCEDURE — 25010000002 CEFEPIME PER 500 MG: Performed by: INTERNAL MEDICINE

## 2019-12-09 PROCEDURE — 99223 1ST HOSP IP/OBS HIGH 75: CPT | Performed by: INTERNAL MEDICINE

## 2019-12-09 PROCEDURE — 99232 SBSQ HOSP IP/OBS MODERATE 35: CPT | Performed by: HOSPITALIST

## 2019-12-09 PROCEDURE — 85027 COMPLETE CBC AUTOMATED: CPT | Performed by: INTERNAL MEDICINE

## 2019-12-09 RX ORDER — TEMAZEPAM 30 MG/1
CAPSULE ORAL
Qty: 30 CAPSULE | Refills: 0 | OUTPATIENT
Start: 2019-12-09

## 2019-12-09 RX ORDER — CYANOCOBALAMIN 1000 UG/ML
INJECTION, SOLUTION INTRAMUSCULAR; SUBCUTANEOUS
Qty: 3 ML | Refills: 3 | Status: SHIPPED | OUTPATIENT
Start: 2019-12-09 | End: 2019-12-30

## 2019-12-09 RX ORDER — AZITHROMYCIN 250 MG/1
TABLET, FILM COATED ORAL
Qty: 6 TABLET | Refills: 0 | OUTPATIENT
Start: 2019-12-09

## 2019-12-09 RX ADMIN — POTASSIUM CHLORIDE 40 MEQ: 1500 TABLET, EXTENDED RELEASE ORAL at 05:48

## 2019-12-09 RX ADMIN — Medication 400 MG: at 08:23

## 2019-12-09 RX ADMIN — Medication 1 CAPSULE: at 08:23

## 2019-12-09 RX ADMIN — FAMOTIDINE 40 MG: 20 TABLET ORAL at 08:23

## 2019-12-09 RX ADMIN — ACETAMINOPHEN 1000 MG: 500 TABLET, FILM COATED ORAL at 14:08

## 2019-12-09 RX ADMIN — Medication 10 ML: at 08:23

## 2019-12-09 RX ADMIN — CEFEPIME HYDROCHLORIDE 2 G: 2 INJECTION, POWDER, FOR SOLUTION INTRAVENOUS at 12:30

## 2019-12-09 RX ADMIN — MAGNESIUM OXIDE TAB 400 MG (241.3 MG ELEMENTAL MG) 400 MG: 400 (241.3 MG) TAB at 21:03

## 2019-12-09 RX ADMIN — POTASSIUM CHLORIDE 40 MEQ: 1500 TABLET, EXTENDED RELEASE ORAL at 21:04

## 2019-12-09 RX ADMIN — CEFEPIME HYDROCHLORIDE 2 G: 2 INJECTION, POWDER, FOR SOLUTION INTRAVENOUS at 04:01

## 2019-12-09 RX ADMIN — ATORVASTATIN CALCIUM 20 MG: 20 TABLET, FILM COATED ORAL at 21:03

## 2019-12-09 RX ADMIN — CEFEPIME HYDROCHLORIDE 2 G: 2 INJECTION, POWDER, FOR SOLUTION INTRAVENOUS at 21:00

## 2019-12-09 RX ADMIN — PREGABALIN 100 MG: 100 CAPSULE ORAL at 08:23

## 2019-12-09 RX ADMIN — PREGABALIN 100 MG: 100 CAPSULE ORAL at 21:03

## 2019-12-09 RX ADMIN — SERTRALINE HYDROCHLORIDE 50 MG: 50 TABLET ORAL at 17:25

## 2019-12-09 RX ADMIN — SODIUM CHLORIDE 1000 MG: 900 INJECTION, SOLUTION INTRAVENOUS at 15:34

## 2019-12-09 RX ADMIN — TRIAMTERENE AND HYDROCHLOROTHIAZIDE 1 TABLET: 37.5; 25 TABLET ORAL at 08:23

## 2019-12-09 RX ADMIN — PREGABALIN 100 MG: 100 CAPSULE ORAL at 15:33

## 2019-12-09 RX ADMIN — Medication 10 ML: at 21:03

## 2019-12-09 RX ADMIN — SODIUM CHLORIDE 1000 MG: 900 INJECTION, SOLUTION INTRAVENOUS at 02:13

## 2019-12-09 NOTE — PLAN OF CARE
Patient has 1 episode of low grade temp , prn tylenol given  ,wbc at 2.0 at this time and patient remains in protective isolation ,will continue to monitor.

## 2019-12-09 NOTE — PROGRESS NOTES
[facility] Medicine Services Daily Progress Note      Hospitalist Team  LOS 1 days      Patient Care Team:  Noemy Queen MD as PCP - General (Family Medicine)  Daryn Tay MD as Consulting Physician (Hematology and Oncology)    Patient Location: 221/1      Subjective   Subjective     Chief Complaint / Subjective  Chief Complaint   Patient presents with   • Fever       Present on Admission:  • Neutropenic fever (CMS/HCC)  • Malignant neoplasm of right ovary (CMS/HCC)  • Left upper extremity deep vein thrombosis (CMS/HCC)  • Hypomagnesemia  • Essential hypertension  • Restless leg syndrome  • Hyperlipidemia      Brief Synopsis of Hospital Course/HPI    Chief Complaint: fever    HPI:   Tahira Zimmerman is a 64 y.o. female patient who has been taking chemotherapy since 2016 after discovering recurrent ovarian cancer.  Initially she had the disease in 1996. She has been cancer free almost 20 years. So far she has got 14 cycles of chemotherapy. The last one was 2 weeks ago. She came to ER due to fever and chills that started couple of days ago. She has some cough but no phlegm. CXR is clean. No urinary Symptoms. No diarrhea.  WBC is 2.3.   She doesn'tappear to be septic. Vanco/Cefepime were started in ER and continued upon admission until blood cultures are final (no growth so far and pending).  Respiratory virus panel was negative.    Review of systems:  12 point review of systems was reviewed and was negative except as above.          Date: 12/9/2019: The patient reports feeling much better.  She has no further shortness of breath and her cough is significantly improved.  She is no longer congested.  No GI or  complaints.        ROS      Objective   Objective      Vital Signs  Temp:  [97.6 °F (36.4 °C)-100.3 °F (37.9 °C)] 97.6 °F (36.4 °C)  Heart Rate:  [51-65] 51  Resp:  [16-18] 18  BP: (115-132)/(66-83) 122/70  Oxygen Therapy  SpO2: 98 %  Pulse Oximetry Type:  "Intermittent  Device (Oxygen Therapy): room air  Flowsheet Rows      First Filed Value   Admission Height  165.1 cm (65\") Documented at 12/08/2019 0949   Admission Weight  82.8 kg (182 lb 8.7 oz) Documented at 12/08/2019 0949        Intake & Output (last 3 days)       12/06 0701 - 12/07 0700 12/07 0701 - 12/08 0700 12/08 0701 - 12/09 0700 12/09 0701 - 12/10 0700    P.O.    1040    Total Intake(mL/kg)    1040 (12.7)    Net    +1040                Lines, Drains & Airways    Active LDAs     Name:   Placement date:   Placement time:   Site:   Days:    Single Lumen Implantable Port 12/08/19 Left Chest   12/08/19    1140    Chest   1                  Physical Exam:    Well-developed well-nourished female comfortable on room air in no acute distress sitting up in bed awake and alert; mucous membranes moist; lungs clear to auscultation bilaterally; CV regular rate and rhythm; abdomen soft nontender nondistended with active bowel sounds; extremities with no edema, cyanosis or calf tenderness; palpable pedal pulses bilaterally; neurologic exam grossly nonfocal; no [place] catheter.      Procedures:              Results Review:     I reviewed the patient's new clinical results.      Lab Results (last 24 hours)     Procedure Component Value Units Date/Time    Blood Culture - Blood, Blood, PICC Line [[id number]] Collected:  12/08/19 1039    Specimen:  Blood, PICC Line Updated:  12/09/19 1112     Blood Culture No growth at 24 hours    Blood Culture - Blood, Arm, Right [[id number]] Collected:  12/08/19 1031    Specimen:  Blood from Arm, Right Updated:  12/09/19 1111     Blood Culture No growth at 24 hours    Basic Metabolic Panel [[id number]]  (Normal) Collected:  12/09/19 0402    Specimen:  Blood Updated:  12/09/19 0545     Glucose 81 mg/dL      BUN 11 mg/dL      Creatinine 0.70 mg/dL      Sodium 142 mmol/L      Potassium 3.6 mmol/L      Chloride 107 mmol/L      CO2 26.0 mmol/L      Calcium 8.6 mg/dL      eGFR Non African Amer 84 " mL/min/1.73      BUN/Creatinine Ratio 15.7     Anion Gap 9.0 mmol/L     Narrative:       GFR Normal >60  Chronic Kidney Disease <60  Kidney Failure <15      Magnesium [[id number]]  (Abnormal) Collected:  12/09/19 0402    Specimen:  Blood Updated:  12/09/19 0545     Magnesium 1.4 mg/dL     Protime-INR [[id number]]  (Abnormal) Collected:  12/09/19 0402    Specimen:  Blood Updated:  12/09/19 0531     Protime 40.0 Seconds      INR 4.26    CBC (No Diff) [[id number]]  (Abnormal) Collected:  12/09/19 0402    Specimen:  Blood Updated:  12/09/19 0518     WBC 2.00 10*3/mm3      RBC 2.64 10*6/mm3      Hemoglobin 9.4 g/dL      Hematocrit 28.1 %      .5 fL      MCH 35.5 pg      MCHC 33.4 g/dL      RDW 16.9 %      RDW-SD 63.9 fl      MPV 9.4 fL      Platelets 54 10*3/mm3     Protime-INR [[id number]]  (Abnormal) Collected:  12/08/19 1646    Specimen:  Blood Updated:  12/08/19 1749     Protime 30.0 Seconds      INR 3.19        No results found for: HGBA1C  Results from last 7 days   Lab Units 12/09/19  0402 12/08/19  1646 12/08/19  1039   INR  4.26* 3.19* 3.05*               Microbiology Results (last 10 days)     Procedure Component Value - Date/Time    Blood Culture - Blood, Blood, PICC Line [[id number]] Collected:  12/08/19 1039    Lab Status:  Preliminary result Specimen:  Blood, PICC Line Updated:  12/09/19 1112     Blood Culture No growth at 24 hours    Respiratory Panel, PCR - Swab, Nasopharynx [[id number]]  (Normal) Collected:  12/08/19 1037    Lab Status:  Final result Specimen:  Swab from Nasopharynx Updated:  12/08/19 1156     ADENOVIRUS, PCR Not Detected     Coronavirus 229E Not Detected     Coronavirus HKU1 Not Detected     Coronavirus NL63 Not Detected     Coronavirus OC43 Not Detected     Human Metapneumovirus Not Detected     Human Rhinovirus/Enterovirus Not Detected     Influenza B PCR Not Detected     Parainfluenza Virus 1 Not Detected     Parainfluenza Virus 2 Not Detected     Parainfluenza Virus 3 Not Detected      Parainfluenza Virus 4 Not Detected     Bordetella pertussis pcr Not Detected     Influenza A H1 2009 PCR Not Detected     Chlamydophila pneumoniae PCR Not Detected     Mycoplasma pneumo by PCR Not Detected     Influenza A PCR Not Detected     Influenza A H3 Not Detected     Influenza A H1 Not Detected     RSV, PCR Not Detected    Blood Culture - Blood, Arm, Right [[id number]] Collected:  12/08/19 1031    Lab Status:  Preliminary result Specimen:  Blood from Arm, Right Updated:  12/09/19 1111     Blood Culture No growth at 24 hours          ECG/EMG Results (most recent)     None               Results for orders placed during the hospital encounter of 10/01/19   Adult Transthoracic Echo Complete W/ Cont if Necessary Per Protocol    Narrative · Left atrial cavity size is borderline dilated.       Normal LV size and contractility EF of 60-65%  Normal RV size  Borderline left atrial enlargement  Aortic valve, mitral valve, tricuspid valve appears structurally normal,   no significant regurgitation seen.  No pericardial effusion seen.  Proximal aorta appears normal in size.         Xr Chest 2 View    Result Date: 12/8/2019   1. No active cardiopulmonary disease 2. Left-sided Htqkmw-r-Jwiw remains in good position. 3. No significant change from 08/30/2019  Electronically Signed By-Parminder Elmore Jr. On:12/8/2019 11:24 AM This report was finalized on [id number] by  Parminder Elmore Jr., .      Xrays, labs reviewed personally by physician.    Medication Review:   I have reviewed the patient's current medication list      Scheduled Meds    [START ON 12/10/2019] !Vancomycin Level Draw Needed  Does not apply Once   [START ON 12/10/2019] !Vancomycin Level Draw Needed  Does not apply Once   atorvastatin 20 mg Oral Nightly   cefepime 2 g Intravenous Q8H   [START ON 12/22/2019] cyanocobalamin 1,000 mcg Intramuscular Q28 Days   famotidine 40 mg Oral Daily   lactobacillus acidophilus 1 capsule Oral Daily   magnesium oxide 400 mg Oral  BID   potassium chloride 40 mEq Oral QAM   potassium chloride 40 mEq Oral Nightly   pregabalin 100 mg Oral TID   sertraline 50 mg Oral Q PM   sodium chloride 10 mL Intravenous Q12H   triamterene-hydrochlorothiazide 1 tablet Oral Daily   vancomycin 15 mg/kg (Adjusted) Intravenous Q12H       Meds Infusions    Pharmacy to dose vancomycin    Pharmacy to dose warfarin        Meds PRN  acetaminophen  •  ondansetron ODT  •  oxyCODONE  •  Pharmacy to dose vancomycin  •  Pharmacy to dose warfarin  •  promethazine  •  sodium chloride  •  temazepam        Assessment/Plan   Assessment/Plan     Active Hospital Problems    Diagnosis  POA   • **Neutropenic fever (CMS/HCC) [D70.9, R50.81]  Yes     Priority: High   • Malignant neoplasm of right ovary (CMS/HCC) [C56.1]  Yes     Priority: High   • Left upper extremity deep vein thrombosis (CMS/HCC) [I82.622]  Yes     Priority: Medium   • Hypomagnesemia [E83.42]  Yes     Priority: Medium   • Essential hypertension [I10]  Yes     Priority: Medium   • Restless leg syndrome [G25.81]  Yes   • Hyperlipidemia [E78.5]  Yes      Resolved Hospital Problems   No resolved problems to display.         Active Hospital Problems:  * Neutropenic fever (CMS/HCC)- (present on admission)  Patient had fever and chills preadmission but no clear source of infection identified.  Respiratory virus panel negative.  Blood cultures x2 negative and pending.    Malignant neoplasm of right ovary (CMS/HCC)- (present on admission)   Ovarian cancer, initially diagnosed in 1996  Surgery/Chemotherapy dose at that time  Recurrent  Disease 4 years ago.  Chemo started - total 14 cycles till current date.  Good general condition.    Left upper extremity deep vein thrombosis (CMS/HCC)- (present on admission)  She is taking on Coumadin.  As well she has a Port on left chest - need chronic anticoagulation     Hypomagnesemia- (present on admission)  Acute on chronic  -Continue oral replacement     Hyperlipidemia- (present on  admission)   Continue atorvastatin            Resolved Hospital Problems:  No notes have been filed under this hospital service.  Service: Hospitalist            VTE Prophylaxis - SCDs.      Code Status -   Code Status and Medical Interventions:   Ordered at: 12/08/19 3661     Level Of Support Discussed With:    Patient     Code Status:    CPR     Medical Interventions (Level of Support Prior to Arrest):    Full       Discharge Planning    Destination      Coordination has not been started for this encounter.      Durable Medical Equipment      Coordination has not been started for this encounter.      Dialysis/Infusion      Coordination has not been started for this encounter.      Home Medical Care      Coordination has not been started for this encounter.      Therapy      Coordination has not been started for this encounter.      Community Resources      Coordination has not been started for this encounter.            Electronically signed by Inez Gudino MD, 12/09/19, 5:10 PM.  [facility] Hospitalist Team

## 2019-12-09 NOTE — ASSESSMENT & PLAN NOTE
Acute on chronic  -Received IV replacement of 3 g magnesium sulfate 12/10/2019  -Continue oral replacement

## 2019-12-09 NOTE — PLAN OF CARE
Problem: Patient Care Overview  Goal: Plan of Care Review  Outcome: Ongoing (interventions implemented as appropriate)  Flowsheets (Taken 12/9/2019 0251)  Progress: improving  Plan of Care Reviewed With: patient  Outcome Summary: Pt has remained fever free throughout shift. No complaints of pain at this time. Will continue to monitor.

## 2019-12-09 NOTE — PROGRESS NOTES
Discharge Planning Assessment   [facility]     Patient Name: Tahira Zimmerman  MRN: [id number]  Today's Date: 12/9/2019    Admit Date: 12/8/2019    Discharge Needs Assessment     Row Name 12/09/19 1702       Living Environment    Lives With  child(quinn), adult;grandchild(quinn)    Current Living Arrangements  home/apartment/condo    Primary Care Provided by  self;child(quinn)    Family Caregiver if Needed  child(quinn), adult    Able to Return to Prior Arrangements  yes       Resource/Environmental Concerns    Resource/Environmental Concerns  none       Transition Planning    Patient/Family Anticipates Transition to  home with family    Patient/Family Anticipated Services at Transition  none    Transportation Anticipated  family or friend will provide       Discharge Needs Assessment    Readmission Within the Last 30 Days  no previous admission in last 30 days    Equipment Currently Used at Home  none    Anticipated Changes Related to Illness  none    Equipment Needed After Discharge  none    Discharge Coordination/Progress  Return home with family.        Discharge Plan     Row Name 12/09/19 1701       Plan    Plan  Return home with family.                  Demographic Summary     Row Name 12/09/19 1708       General Information    Admission Type  inpatient    Arrived From  emergency department    Referral Source  admission list    Reason for Consult  discharge planning    Preferred Language  English                   Dru Barroso RN

## 2019-12-09 NOTE — CONSULTS
Hematology/Oncology Inpatient Consultation    Patient name: Tahira Zimmerman  : 1955  MRN: [id number]  Referring Provider: Dr. SHAY Barraza  Reason for Consultation: Cytopenia and recurrent ovarian cancer.    Chief complaint: Fever    History of present illness:    64 y.o. female who was admitted through the ER on 2019 complaining of fever and weakness for a few days.  She had a nonproductive cough.  Her temperature had been as high as 102.  On admission, she was found to be pancytopenic but did not require blood transfusions.  She was cultured and chest x-ray was negative.  She was started on vancomycin and cefepime.  ANC was 1.6.  INR 3.05-on Coumadin as outpatient for a left upper extremity DVT.    19  Hematology/Oncology was consulted as she is an established patient who we follow for her recurrent ovarian cancer.  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  She developed recurrent disease, intra-abdominally, 6 months later, requiring intra-abdominal peritoneal chemotherapy.  She was disease-free until  when she underwent exploratory laparotomy, omentectomy, bowel resection, liver biopsy and appendectomy at [facility].  Metastatic serous carcinoma was found to involve the colon, omentum, rectum and vaginal mass with positive margins.  Liver biopsy was negative.  She completed 7 cycles of chemotherapy in  and was in remission on CT scan.  She recurred in  and was started on chemotherapy followed by disease progression.  Since then, she has been on multiple lines of therapy with very short breaks between treatments.  Status post cycle 11 chemotherapy with Doxil/carboplatin plus Neulasta support on 2019.  -DVT of the left upper extremity-diagnosed in 2013.  Treated with long-term Coumadin.  -Pernicious anemia diagnosed March  2016 and MELANI poor p.o. iron absorption diagnosed in March 2018 with stool heme negative.  Intrinsic factor antibody was positive and parietal cell antibody was negative.  She is on long-term B12 injections monthly.  Day 8 Injectafer given on 9/6/2019 and started on retacrit 40,000 units weekly on 10/25/2019-last given 12/6.  Office visit 12/6/2019 with no complaints.  WBC 4.02, hemoglobin 9.3, .2 and platelets 61K.  CT scans October 29, 2019 showed a new 2 mm nodule in the left lower lobe and a stable nodule in the left lower lobe.  She had stable soft tissue density and calcified nodules in the ventral abdominal wall and left retroperitoneal fat consistent with nonviable metastatic disease.  Weekly magnesium replacement continued.  INR was 2.0.  She was continued on monthly B12 injections. Retacrit and chemotherapy was continued.    PCP: Noemy Queen MD    History:  Past Medical History:   Diagnosis Date   • Anemia 2013   • Cervical disc disorder 2013    Herniated disc, pinched nerve   • Clotting disorder (CMS/HCC) 2013    Low platelets from chemo   • Colon polyp 2013   • Deep vein thrombosis (CMS/HCC) 2013   • Diverticulosis 2013   • GERD (gastroesophageal reflux disease) 2016   • HL (hearing loss) 2016    I need hearing aids   • Hyperlipidemia 2013    Need my cholesterol rechecked   • Hypertension    • Joint pain 2013    Shoulder pain, torn rotator cuff   • Low back pain 2013   • Neuromuscular disorder (CMS/HCC) 2015    Shingles, pinched nerves in my neck   • Osteopenia 2014   • Osteoporosis    • Ovarian cancer (CMS/HCC)    • Pneumonia 2010    Have had it several times   • Spinal stenosis 2013    Cervical   • Urinary tract infection 2013    Have had several utis   , Past Surgical History:   Procedure Laterality Date   • COLON SURGERY     • COLONOSCOPY     • EXPLORATORY LAPAROTOMY     • HERNIA REPAIR     • HYSTERECTOMY     • OOPHORECTOMY     , Family History   Problem Relation Age of Onset   •  Hypertension Mother    • Cancer Father    • Hypertension Father    • Hypertension Sister    • Hypertension Brother    • Stroke Brother    , Social History     Tobacco Use   • Smoking status: Never Smoker   • Smokeless tobacco: Never Used   Substance Use Topics   • Alcohol use: No     Frequency: Never   • Drug use: No   , Medications Prior to Admission   Medication Sig Dispense Refill Last Dose   • acetaminophen (TYLENOL) 500 MG tablet Take 1,000 mg by mouth Every 6 (Six) Hours As Needed for Mild Pain .      • cyanocobalamin 1000 MCG/ML injection Inject 1,000 mcg into the appropriate muscle as directed by prescriber Every 28 (Twenty-Eight) Days.   11/24/2019   • famotidine (PEPCID) 40 MG tablet Take 40 mg by mouth Daily.      • LYRICA 100 MG capsule Take 1 capsule by mouth 3 (Three) Times a Day. 90 capsule 2 12/8/2019 at Unknown time   • magnesium oxide (MAG-OX) 400 MG tablet Take 400 mg by mouth 2 (Two) Times a Day.      • ondansetron ODT (ZOFRAN-ODT) 8 MG disintegrating tablet Take 8 mg by mouth Every 8 (Eight) Hours As Needed for Nausea or Vomiting.      • oxyCODONE (ROXICODONE) 10 MG tablet Take 1 tablet by mouth 3 (Three) Times a Day As Needed for Moderate Pain . 90 tablet 0 12/8/2019 at Unknown time   • potassium chloride (K-DUR,KLOR-CON) 20 MEQ CR tablet Take 60 mEq by mouth Every Morning.      • potassium chloride (K-DUR,KLOR-CON) 20 MEQ CR tablet Take 40 mEq by mouth Every Night.      • sertraline (ZOLOFT) 50 MG tablet Take 50 mg by mouth Every Evening.      • temazepam (RESTORIL) 30 MG capsule Take 30 mg by mouth At Night As Needed for Sleep.      • triamterene-hydrochlorothiazide (DYAZIDE) 37.5-25 MG per capsule Take 1 capsule by mouth Every Morning.      • warfarin (COUMADIN) 6 MG tablet Take 6 mg by mouth Daily.      • atorvastatin (LIPITOR) 20 MG tablet Take 20 mg by mouth every night at bedtime.  3 Taking   • promethazine (PHENERGAN) 25 MG tablet Take 1 tablet by mouth Every 6 (Six) Hours As Needed for  Nausea or Vomiting. 12 tablet 0 Taking   , Scheduled Meds:    [START ON 12/10/2019] !Vancomycin Level Draw Needed  Does not apply Once   [START ON 12/10/2019] !Vancomycin Level Draw Needed  Does not apply Once   atorvastatin 20 mg Oral Nightly   cefepime 2 g Intravenous Q8H   [START ON 12/22/2019] cyanocobalamin 1,000 mcg Intramuscular Q28 Days   famotidine 40 mg Oral Daily   lactobacillus acidophilus 1 capsule Oral Daily   magnesium oxide 400 mg Oral Daily   potassium chloride 40 mEq Oral QAM   potassium chloride 40 mEq Oral Nightly   pregabalin 100 mg Oral TID   sertraline 50 mg Oral Q PM   sodium chloride 10 mL Intravenous Q12H   triamterene-hydrochlorothiazide 1 tablet Oral Daily   vancomycin 15 mg/kg (Adjusted) Intravenous Q12H   , Continuous Infusions:    Pharmacy to dose vancomycin    Pharmacy to dose warfarin    , PRN Meds:  acetaminophen  •  ondansetron ODT  •  oxyCODONE  •  Pharmacy to dose vancomycin  •  Pharmacy to dose warfarin  •  promethazine  •  sodium chloride  •  temazepam   Allergies:  Morphine; Penicillin v potassium; Sulfa antibiotics; Levaquin [levofloxacin]; Amoxicillin; and Norco [hydrocodone-acetaminophen]    ROS:  Review of Systems   Constitutional: Positive for chills and fever.   HENT: Negative for ear pain, mouth sores, nosebleeds and sore throat.    Eyes: Negative for photophobia and visual disturbance.   Respiratory: Negative for wheezing and stridor.    Cardiovascular: Negative for chest pain and palpitations.   Gastrointestinal: Negative for abdominal pain, diarrhea, nausea and vomiting.   Endocrine: Negative for cold intolerance and heat intolerance.   Genitourinary: Negative for dysuria and hematuria.   Musculoskeletal: Negative for joint swelling and neck stiffness.   Skin: Negative for color change and rash.   Neurological: Negative for seizures and syncope.   Hematological: Negative for adenopathy.        No obvious bleeding   Psychiatric/Behavioral: Negative for agitation,  "confusion and hallucinations.        Objective     Vital Signs:   /70 (BP Location: Right arm, Patient Position: Lying)   Pulse 51   Temp 97.6 °F (36.4 °C) (Oral)   Resp 18   Ht 165.1 cm (65\")   Wt 81.7 kg (180 lb 1.9 oz)   SpO2 98%   BMI 29.97 kg/m²     Physical Exam:  Physical Exam   Constitutional: She is oriented to person, place, and time. No distress.   HENT:   Head: Normocephalic and atraumatic.   Eyes: Conjunctivae and EOM are normal. Right eye exhibits no discharge. Left eye exhibits no discharge. No scleral icterus.   Neck: Normal range of motion. Neck supple. No thyromegaly present.   Cardiovascular: Normal rate, regular rhythm and normal heart sounds. Exam reveals no gallop and no friction rub.   Pulmonary/Chest: Effort normal. No stridor. No respiratory distress. She has no wheezes.   Abdominal: Soft. Bowel sounds are normal. She exhibits no mass. There is no tenderness. There is no rebound and no guarding.   Musculoskeletal: Normal range of motion. She exhibits no tenderness.   Lymphadenopathy:     She has no cervical adenopathy.   Neurological: She is alert and oriented to person, place, and time. She exhibits normal muscle tone.   Skin: Skin is warm. Rash noted. She is not diaphoretic. No erythema.   Psychiatric: She has a normal mood and affect. Her behavior is normal.   Nursing note and vitals reviewed.  Rash on the face     Results Review:  Lab Results (last 48 hours)     Procedure Component Value Units Date/Time    Blood Culture - Blood, Blood, PICC Line [[id number]] Collected:  12/08/19 1039    Specimen:  Blood, PICC Line Updated:  12/09/19 1112     Blood Culture No growth at 24 hours    Blood Culture - Blood, Arm, Right [[id number]] Collected:  12/08/19 1031    Specimen:  Blood from Arm, Right Updated:  12/09/19 1111     Blood Culture No growth at 24 hours    Basic Metabolic Panel [[id number]]  (Normal) Collected:  12/09/19 0402    Specimen:  Blood Updated:  12/09/19 0545     Glucose " 81 mg/dL      BUN 11 mg/dL      Creatinine 0.70 mg/dL      Sodium 142 mmol/L      Potassium 3.6 mmol/L      Chloride 107 mmol/L      CO2 26.0 mmol/L      Calcium 8.6 mg/dL      eGFR Non African Amer 84 mL/min/1.73      BUN/Creatinine Ratio 15.7     Anion Gap 9.0 mmol/L     Narrative:       GFR Normal >60  Chronic Kidney Disease <60  Kidney Failure <15      Magnesium [[id number]]  (Abnormal) Collected:  12/09/19 0402    Specimen:  Blood Updated:  12/09/19 0545     Magnesium 1.4 mg/dL     Protime-INR [[id number]]  (Abnormal) Collected:  12/09/19 0402    Specimen:  Blood Updated:  12/09/19 0531     Protime 40.0 Seconds      INR 4.26    CBC (No Diff) [[id number]]  (Abnormal) Collected:  12/09/19 0402    Specimen:  Blood Updated:  12/09/19 0518     WBC 2.00 10*3/mm3      RBC 2.64 10*6/mm3      Hemoglobin 9.4 g/dL      Hematocrit 28.1 %      .5 fL      MCH 35.5 pg      MCHC 33.4 g/dL      RDW 16.9 %      RDW-SD 63.9 fl      MPV 9.4 fL      Platelets 54 10*3/mm3     Protime-INR [[id number]]  (Abnormal) Collected:  12/08/19 1646    Specimen:  Blood Updated:  12/08/19 1749     Protime 30.0 Seconds      INR 3.19    Respiratory Panel, PCR - Swab, Nasopharynx [[id number]]  (Normal) Collected:  12/08/19 1037    Specimen:  Swab from Nasopharynx Updated:  12/08/19 1156     ADENOVIRUS, PCR Not Detected     Coronavirus 229E Not Detected     Coronavirus HKU1 Not Detected     Coronavirus NL63 Not Detected     Coronavirus OC43 Not Detected     Human Metapneumovirus Not Detected     Human Rhinovirus/Enterovirus Not Detected     Influenza B PCR Not Detected     Parainfluenza Virus 1 Not Detected     Parainfluenza Virus 2 Not Detected     Parainfluenza Virus 3 Not Detected     Parainfluenza Virus 4 Not Detected     Bordetella pertussis pcr Not Detected     Influenza A H1 2009 PCR Not Detected     Chlamydophila pneumoniae PCR Not Detected     Mycoplasma pneumo by PCR Not Detected     Influenza A PCR Not Detected     Influenza A H3 Not  Detected     Influenza A H1 Not Detected     RSV, PCR Not Detected    Urinalysis With Culture If Indicated - Urine, Catheter [[id number]]  (Normal) Collected:  12/08/19 1140    Specimen:  Urine, Catheter Updated:  12/08/19 1148     Color, UA Yellow     Appearance, UA Clear     pH, UA 5.5     Specific Gravity, UA 1.021     Glucose, UA Negative     Ketones, UA Negative     Bilirubin, UA Negative     Blood, UA Negative     Protein, UA Negative     Leuk Esterase, UA Negative     Nitrite, UA Negative     Urobilinogen, UA 0.2 E.U./dL    Narrative:       Urine microscopic not indicated.    [place] Draw [[id number]] Collected:  12/08/19 1039    Specimen:  Blood Updated:  12/08/19 1145    Narrative:       The following orders were created for panel order [place] Draw.  Procedure                               Abnormality         Status                     ---------                               -----------         ------                     Light Blue Top[[id number]]                                   Final result               Green Top (Gel)[[id number]]                                  Final result               Lavender Top[[id number]]                                     Final result               Gold Top - SST[[id number]]                                   Final result                 Please view results for these tests on the individual orders.    Light Blue Top [[id number]] Collected:  12/08/19 1039    Specimen:  Blood Updated:  12/08/19 1145     Extra Tube hold for add-on     Comment: Auto resulted       Green Top (Gel) [[id number]] Collected:  12/08/19 1039    Specimen:  Blood Updated:  12/08/19 1145     Extra Tube Hold for add-ons.     Comment: Auto resulted.       Lavender Top [[id number]] Collected:  12/08/19 1039    Specimen:  Blood Updated:  12/08/19 1145     Extra Tube hold for add-on     Comment: Auto resulted       Gold Top - SST [[id number]] Collected:  12/08/19 1039    Specimen:  Blood Updated:  12/08/19 1145     Extra  "Tube Hold for add-ons.     Comment: Auto resulted.       Procalcitonin [[id number]]  (Abnormal) Collected:  12/08/19 1039    Specimen:  Blood Updated:  12/08/19 1121     Procalcitonin 0.29 ng/mL     Narrative:       As a Marker for Sepsis (Non-Neonates):   1. <0.5 ng/mL represents a low risk of severe sepsis and/or septic shock.  1. >2 ng/mL represents a high risk of severe sepsis and/or septic shock.    As a Marker for Lower Respiratory Tract Infections that require antibiotic therapy:  PCT on Admission     Antibiotic Therapy             6-12 Hrs later  > 0.5                Strongly Recommended            >0.25 - <0.5         Recommended  0.1 - 0.25           Discouraged                   Remeasure/reassess PCT  <0.1                 Strongly Discouraged          Remeasure/reassess PCT      As 28 day mortality risk marker: \"Change in Procalcitonin Result\" (> 80 % or <=80 %) if Day 0 (or Day 1) and Day 4 values are available. Refer to http://www.[email or web address]cal[facility]/   Change in PCT <=80 %   A decrease of PCT levels below or equal to 80 % defines a positive change in PCT test result representing a higher risk for 28-day all-cause mortality of patients diagnosed with severe sepsis or septic shock.  Change in PCT > 80 %   A decrease of PCT levels of more than 80 % defines a negative change in PCT result representing a lower risk for 28-day all-cause mortality of patients diagnosed with severe sepsis or septic shock.                  Comprehensive Metabolic Panel [[id number]]  (Abnormal) Collected:  12/08/19 1039    Specimen:  Blood Updated:  12/08/19 1119     Glucose 92 mg/dL      BUN 14 mg/dL      Creatinine 0.84 mg/dL      Sodium 137 mmol/L      Potassium 3.5 mmol/L      Chloride 102 mmol/L      CO2 26.0 mmol/L      Calcium 8.5 mg/dL      Total Protein 6.3 g/dL      Albumin 3.70 g/dL      ALT (SGPT) 7 U/L      AST (SGOT) 17 U/L      Alkaline Phosphatase 119 U/L      Total Bilirubin 0.2 mg/dL      eGFR Non  " Amer 68 mL/min/1.73      Globulin 2.6 gm/dL      A/G Ratio 1.4 g/dL      BUN/Creatinine Ratio 16.7     Anion Gap 9.0 mmol/L     Narrative:       GFR Normal >60  Chronic Kidney Disease <60  Kidney Failure <15      Troponin [[id number]]  (Normal) Collected:  12/08/19 1039    Specimen:  Blood Updated:  12/08/19 1119     Troponin T <0.010 ng/mL     Narrative:       Troponin T Reference Range:  <= 0.03 ng/mL-   Negative for AMI  >0.03 ng/mL-     Abnormal for myocardial necrosis.  Clinicians would have to utilize clinical acumen, EKG, Troponin and serial changes to determine if it is an Acute Myocardial Infarction or myocardial injury due to an underlying chronic condition.     Magnesium [[id number]]  (Abnormal) Collected:  12/08/19 1039    Specimen:  Blood Updated:  12/08/19 1119     Magnesium 1.3 mg/dL     CK [[id number]]  (Normal) Collected:  12/08/19 1039    Specimen:  Blood Updated:  12/08/19 1119     Creatine Kinase 57 U/L     BNP [[id number]]  (Normal) Collected:  12/08/19 1039    Specimen:  Blood Updated:  12/08/19 1114     proBNP 138.3 pg/mL     Narrative:       Among patients with dyspnea, NT-proBNP is highly sensitive for the detection of acute congestive heart failure. In addition NT-proBNP of <300 pg/ml effectively rules out acute congestive heart failure with 99% negative predictive value.      CBC & Differential [[id number]] Collected:  12/08/19 1039    Specimen:  Blood Updated:  12/08/19 1110    Narrative:       The following orders were created for panel order CBC & Differential.  Procedure                               Abnormality         Status                     ---------                               -----------         ------                     CBC Auto Differential[[id number]]        Abnormal            Final result                 Please view results for these tests on the individual orders.    CBC Auto Differential [[id number]]  (Abnormal) Collected:  12/08/19 1039    Specimen:  Blood Updated:   12/08/19 1110     WBC 2.30 10*3/mm3      Comment: Result checked         RBC 2.92 10*6/mm3      Hemoglobin 10.4 g/dL      Hematocrit 31.0 %      .3 fL      MCH 35.5 pg      MCHC 33.4 g/dL      RDW 16.7 %      RDW-SD 62.6 fl      MPV 8.8 fL      Platelets 59 10*3/mm3      Neutrophil % 69.6 %      Lymphocyte % 20.7 %      Monocyte % 7.4 %      Eosinophil % 2.0 %      Basophil % 0.3 %      Neutrophils, Absolute 1.60 10*3/mm3      Lymphocytes, Absolute 0.50 10*3/mm3      Monocytes, Absolute 0.20 10*3/mm3      Eosinophils, Absolute 0.00 10*3/mm3      Basophils, Absolute 0.00 10*3/mm3      nRBC 0.2 /100 WBC     Protime-INR [[id number]]  (Abnormal) Collected:  12/08/19 1039    Specimen:  Blood Updated:  12/08/19 1057     Protime 28.7 Seconds      INR 3.05    POC Lactate [[id number]]  (Normal) Collected:  12/08/19 1042    Specimen:  Blood Updated:  12/08/19 1043     Lactate 0.5 mmol/L      Comment: Serial Number: [id number]:  [id number]              Pending Results: blood cultures    Imaging Reviewed:   Xr Chest 2 View    Result Date: 12/8/2019   1. No active cardiopulmonary disease 2. Left-sided [id number] remains in good position. 3. No significant change from 08/30/2019  Electronically Signed By-Parminder Elmore Jr. On:12/8/2019 11:24 AM This report was finalized on [id number] by  Parminder Elmore Jr., .      I have reviewed the patient's labs, imaging, reports, and other clinician documentation.         Assessment/Plan   ASSESSMENT  1. Recurrent metastatic ovarian cancer-status post cycle 11 of Doxil/carboplatin with Neulasta support.  ANC 1.6.  Recent CTs showed a new small left lower lobe lung nodule.  2. Pancytopenia-ANC 1.6, recent Neulasta given.  Hemoglobin and platelet count stable.  Retacrit due for chemo-induced anemia on 12/13.  3. Fever with leukopenia- afebrile since admission.  RVP, chest x-ray and urinalysis are negative for infection.  Lactate normal.  On vancomycin and cefepime.  Blood cultures  negative to date.  4. Left upper extremity DVT/coagulopathy-INR 4.26-Coumadin discontinued until INR less than 2.5.  5. Hypomagnesemia-due to chemotherapy.  On weekly replacement.  Increase magnesium oxide to twice daily.    PLAN  1. Discontinue Coumadin  2. Continue weekly Retacrit to 12/13.  3. Increase magnesium oxide to twice daily  4. Continue chemotherapy as outpatient.  5. Continue antibiotics and await final blood cultures.  6. Daily CBC and INR.    Note initiated by IRENA Krishnan.  Pt seen and examined by Dr. Ball.   Thank you for this consultation.  We will be glad to follow her along with you.   Electronically signed by IRENA Hoffman, 12/09/19, 12:05 PM.    I have personally performed a face-to-face diagnostic evaluation on this patient.  I have reviewed and agreed with the care plan.  Face evaluation completed.  Notes reviewed and edited.  Discussed with nurse practitioner.  As above.  Will follow cultures.  Empiric antibiotics.  Monitor her counts very closely.    I discussed the patients findings and my recommendations with patient.    Thank you for this consult.  We will be happy to follow along in the care of this patient.     MER Ball MD  12/09/19  11:20 AM

## 2019-12-09 NOTE — PROGRESS NOTES
"Pharmacokinetic Consult - Vancomycin Dosing    Tahira Zimmerman is a 64 y.o. year old female  with neutropenic fever. Pharmacy has been consulted to dose vancomycin.      PMH: malignant neoplasm of right ovary     Assessment/Plan    1. Day #2 vancomycin: Goal trough: 10-20 mg/L with -600 mg*h/mL. Patient was started on vancomycin 1750 mg (~26 mg/kg DBW) IV once followed by 1000 mg (~15 mg/kg DBW) IV q12h. Obtain vancomycin peak on 12/10 at 0700 and trough on 12/10 at 1400 for clinical review, or earlier if clinically warranted.     2. Day #2 cefepime 2 gm IV q8h, appropriate for CrCL >60 ml/min    3. Pharmacy will continue to monitor for changes in culture results, renal function, and clinical status. Adjustments in antimicrobial pharmacokinetic dosing will be made per protocol and recommendations for de-escalation and adjustments to antimicrobial therapy will be communicated when clinically appropriate.        Relevant clinical data and objective history reviewed:  165.1 cm (65\")   81.7 kg (180 lb 1.9 oz)   Ideal body weight: 57 kg (125 lb 10.6 oz)  Adjusted ideal body weight: 66.9 kg (147 lb 7.1 oz)    Lab Results   Component Value Date    CREATININE 0.70 12/09/2019    CREATININE 0.84 12/08/2019    CREATININE 0.90 11/22/2019     Estimated Creatinine Clearance: 85.7 mL/min (by C-G formula based on SCr of 0.7 mg/dL).  No intake/output data recorded.    Lab Results   Component Value Date    WBC 2.00 (L) 12/09/2019    WBC 2.30 (L) 12/08/2019    WBC 4.02 12/06/2019     Temperature    12/08/19 1212 12/08/19 2010 12/09/19 0533   Temp: 98.6 °F (37 °C) 98.8 °F (37.1 °C) 97.6 °F (36.4 °C)             Baseline culture/source/susceptibility:  Microbiology Results (last 10 days)       Procedure Component Value - Date/Time    Respiratory Panel, PCR - Swab, Nasopharynx [[id number]]  (Normal) Collected:  12/08/19 1037    Lab Status:  Final result Specimen:  Swab from Nasopharynx Updated:  12/08/19 1156     ADENOVIRUS, " PCR Not Detected     Coronavirus 229E Not Detected     Coronavirus HKU1 Not Detected     Coronavirus NL63 Not Detected     Coronavirus OC43 Not Detected     Human Metapneumovirus Not Detected     Human Rhinovirus/Enterovirus Not Detected     Influenza B PCR Not Detected     Parainfluenza Virus 1 Not Detected     Parainfluenza Virus 2 Not Detected     Parainfluenza Virus 3 Not Detected     Parainfluenza Virus 4 Not Detected     Bordetella pertussis pcr Not Detected     Influenza A H1 2009 PCR Not Detected     Chlamydophila pneumoniae PCR Not Detected     Mycoplasma pneumo by PCR Not Detected     Influenza A PCR Not Detected     Influenza A H3 Not Detected     Influenza A H1 Not Detected     RSV, PCR Not Detected             Raquel Vogel, PharmD, PharmD, BCPS

## 2019-12-09 NOTE — TELEPHONE ENCOUNTER
Pt is in the hospital calling for a refill of Restoril. The pt is getting it in the hospital but dtr said pt needs a refill for home once she is d/c.

## 2019-12-09 NOTE — H&P
[facility]   HISTORY AND PHYSICAL    Patient Name: Tahira Zimmerman  : 1955  MRN: [id number]  Primary Care Physician: Noemy Queen MD  Date of admission: 2019      Subjective   Subjective     Chief Complaint: fever    HPI:   Tahira Zimmerman is a 64 y.o. female patient who has been taking chemotherapy since  after discovering recurrent ovarian cancer.  Initially she had the disease in . She has been cancer free almost 20 years. So far she has got 14 cycles of chemotherapy. The last one was 2 weeks ago. She came to ER due to fever and chills that started couple of days ago. She has some cough but no phlegm. CXR is clean. No urinary Symptoms. No diarrhea.  WBC is 2.3.   She doesn'tappear to be septic. Vanco/Cefepime were started in ER and continued upon admission until blood cultures are final (no growth so far and pending).  Respiratory virus panel was negative.    Review of systems:  12 point review of systems was reviewed and was negative except as above.      Personal History     Past Medical History:   Diagnosis Date   • Anemia    • Cervical disc disorder     Herniated disc, pinched nerve   • Clotting disorder (CMS/HCC)     Low platelets from chemo   • Colon polyp    • Deep vein thrombosis (CMS/HCC)    • Diverticulosis    • GERD (gastroesophageal reflux disease)    • HL (hearing loss) 2016    I need hearing aids   • Hyperlipidemia 2013    Need my cholesterol rechecked   • Hypertension    • Joint pain     Shoulder pain, torn rotator cuff   • Low back pain    • Neuromuscular disorder (CMS/HCC)     Shingles, pinched nerves in my neck   • Osteopenia    • Osteoporosis    • Ovarian cancer (CMS/HCC)    • Pneumonia     Have had it several times   • Spinal stenosis     Cervical   • Urinary tract infection     Have had several utis       Past Surgical History:   Procedure Laterality Date   • COLON SURGERY     • COLONOSCOPY     •  EXPLORATORY LAPAROTOMY     • HERNIA REPAIR     • HYSTERECTOMY     • OOPHORECTOMY         Family History: family history includes Cancer in her father; Hypertension in her brother, father, mother, and sister; Stroke in her brother. Otherwise pertinent FHx was reviewed and unremarkable.     Social History:  reports that she has never smoked. She has never used smokeless tobacco. She reports that she does not drink alcohol or use drugs.      Medications:  Medications Prior to Admission   Medication Sig Dispense Refill Last Dose   • acetaminophen (TYLENOL) 500 MG tablet Take 1,000 mg by mouth Every 6 (Six) Hours As Needed for Mild Pain .      • cyanocobalamin 1000 MCG/ML injection Inject 1,000 mcg into the appropriate muscle as directed by prescriber Every 28 (Twenty-Eight) Days.   11/24/2019   • famotidine (PEPCID) 40 MG tablet Take 40 mg by mouth Daily.      • LYRICA 100 MG capsule Take 1 capsule by mouth 3 (Three) Times a Day. 90 capsule 2 12/8/2019 at Unknown time   • magnesium oxide (MAG-OX) 400 MG tablet Take 400 mg by mouth 2 (Two) Times a Day.      • ondansetron ODT (ZOFRAN-ODT) 8 MG disintegrating tablet Take 8 mg by mouth Every 8 (Eight) Hours As Needed for Nausea or Vomiting.      • oxyCODONE (ROXICODONE) 10 MG tablet Take 1 tablet by mouth 3 (Three) Times a Day As Needed for Moderate Pain . 90 tablet 0 12/8/2019 at Unknown time   • potassium chloride (K-DUR,KLOR-CON) 20 MEQ CR tablet Take 60 mEq by mouth Every Morning.      • potassium chloride (K-DUR,KLOR-CON) 20 MEQ CR tablet Take 40 mEq by mouth Every Night.      • sertraline (ZOLOFT) 50 MG tablet Take 50 mg by mouth Every Evening.      • temazepam (RESTORIL) 30 MG capsule Take 30 mg by mouth At Night As Needed for Sleep.      • triamterene-hydrochlorothiazide (DYAZIDE) 37.5-25 MG per capsule Take 1 capsule by mouth Every Morning.      • warfarin (COUMADIN) 6 MG tablet Take 6 mg by mouth Daily.      • atorvastatin (LIPITOR) 20 MG tablet Take 20 mg by  mouth every night at bedtime.  3 Taking   • promethazine (PHENERGAN) 25 MG tablet Take 1 tablet by mouth Every 6 (Six) Hours As Needed for Nausea or Vomiting. 12 tablet 0 Taking           Allergies   Allergen Reactions   • Morphine Nausea Only and Hives   • Penicillin V Potassium Rash   • Sulfa Antibiotics Rash   • Levaquin [Levofloxacin] Nausea Only and Dizziness     When taken with Coumadin   • Amoxicillin Rash   • Norco [Hydrocodone-Acetaminophen] Rash       Objective   Objective     Vital Signs:   Temp:  [97.6 °F (36.4 °C)-100.3 °F (37.9 °C)] 97.6 °F (36.4 °C)  Heart Rate:  [51-65] 51  Resp:  [16-18] 18  BP: (115-132)/(66-83) 122/70        Physical exam:  Well-developed well-nourished female comfortable on room air in no acute distress sitting up in bed awake and alert; mucous membranes moist; lungs clear to auscultation bilaterally; CV regular rate and rhythm; abdomen soft nontender nondistended with active bowel sounds; extremities with no edema, cyanosis or calf tenderness; palpable pedal pulses bilaterally; neurologic exam grossly nonfocal; no [place] catheter.    Results Reviewed:  I have personally reviewed most recent lab results and radiology images and interpretations and agree with findings.      Results from last 7 days   Lab Units 12/09/19  0402   WBC 10*3/mm3 2.00*   HEMOGLOBIN g/dL 9.4*   HEMATOCRIT % 28.1*   PLATELETS 10*3/mm3 54*   INR  4.26*     Results from last 7 days   Lab Units 12/09/19  0402 12/08/19  1042 12/08/19  1039   SODIUM mmol/L 142  --  137   POTASSIUM mmol/L 3.6  --  3.5   CHLORIDE mmol/L 107  --  102   CO2 mmol/L 26.0  --  26.0   BUN mg/dL 11  --  14   CREATININE mg/dL 0.70  --  0.84   GLUCOSE mg/dL 81  --  92   CALCIUM mg/dL 8.6  --  8.5*   ALT (SGPT) U/L  --   --  7   AST (SGOT) U/L  --   --  17   TROPONIN T ng/mL  --   --  <0.010   PROBNP pg/mL  --   --  138.3   LACTATE mmol/L  --  0.5  --    PROCALCITONIN ng/mL  --   --  0.29*     Estimated Creatinine Clearance: 85.7 mL/min (by  C-G formula based on SCr of 0.7 mg/dL).  Brief Urine Lab Results  (Last result in the past 365 days)      Color   Clarity   Blood   Leuk Est   Nitrite   Protein   CREAT   Urine HCG        12/08/19 1140 Yellow Clear Negative Negative Negative Negative             Imaging Results (Last 24 Hours)     ** No results found for the last 24 hours. **        Results for orders placed during the hospital encounter of 10/01/19   Adult Transthoracic Echo Complete W/ Cont if Necessary Per Protocol    Narrative · Left atrial cavity size is borderline dilated.       Normal LV size and contractility EF of 60-65%  Normal RV size  Borderline left atrial enlargement  Aortic valve, mitral valve, tricuspid valve appears structurally normal,   no significant regurgitation seen.  No pericardial effusion seen.  Proximal aorta appears normal in size.         Assessment/Plan   Assessment / Plan     Brief Patient Summary:  Tahira Zimmerman is a 64 y.o. female who admitted for the evaluation of neutropenic fever.    Active Hospital Problems:  * Neutropenic fever (CMS/HCC)– (present on admission)  Patient had fever and chills preadmission but no clear source of infection identified.  Respiratory virus panel negative.  Blood cultures x2 negative and pending.    Malignant neoplasm of right ovary (CMS/HCC)– (present on admission)   Ovarian cancer, initially diagnosed in 1996  Surgery/Chemotherapy dose at that time  Recurrent  Disease 4 years ago.  Chemo started - total 14 cycles till current date.  Good general condition.    Left upper extremity deep vein thrombosis (CMS/HCC)– (present on admission)  She is taking on Coumadin.  As well she has a Port on left chest - need chronic anticoagulation     Hypomagnesemia– (present on admission)  Acute on chronic  -Continue oral replacement     Hyperlipidemia– (present on admission)   Continue atorvastatin              DVT prophylaxis:  She is on Coumadin.  SCDs.    CODE STATUS:    Code Status and  Medical Interventions:   Ordered at: 12/08/19 1537     Level Of Support Discussed With:    Patient     Code Status:    CPR     Medical Interventions (Level of Support Prior to Arrest):    Full       Admission Status:  I believe this patient meets inpatient criteria.    Electronically signed by Jacky Barraza MD, 12/08/19, 3:52 PM.

## 2019-12-10 LAB
BASOPHILS # BLD AUTO: 0 10*3/MM3 (ref 0–0.2)
BASOPHILS NFR BLD AUTO: 0.3 % (ref 0–1.5)
DEPRECATED RDW RBC AUTO: 63 FL (ref 37–54)
EOSINOPHIL # BLD AUTO: 0.1 10*3/MM3 (ref 0–0.4)
EOSINOPHIL NFR BLD AUTO: 6.5 % (ref 0.3–6.2)
ERYTHROCYTE [DISTWIDTH] IN BLOOD BY AUTOMATED COUNT: 16.9 % (ref 12.3–15.4)
HCT VFR BLD AUTO: 29.5 % (ref 34–46.6)
HGB BLD-MCNC: 10 G/DL (ref 12–15.9)
INR PPP: 3.93 (ref 2–3)
LYMPHOCYTES # BLD AUTO: 0.7 10*3/MM3 (ref 0.7–3.1)
LYMPHOCYTES NFR BLD AUTO: 29.7 % (ref 19.6–45.3)
MAGNESIUM SERPL-MCNC: 1.1 MG/DL (ref 1.6–2.4)
MCH RBC QN AUTO: 35.8 PG (ref 26.6–33)
MCHC RBC AUTO-ENTMCNC: 34 G/DL (ref 31.5–35.7)
MCV RBC AUTO: 105.4 FL (ref 79–97)
MONOCYTES # BLD AUTO: 0.3 10*3/MM3 (ref 0.1–0.9)
MONOCYTES NFR BLD AUTO: 14.3 % (ref 5–12)
NEUTROPHILS # BLD AUTO: 1.1 10*3/MM3 (ref 1.7–7)
NEUTROPHILS NFR BLD AUTO: 49.2 % (ref 42.7–76)
NRBC BLD AUTO-RTO: 0.3 /100 WBC (ref 0–0.2)
PLATELET # BLD AUTO: 63 10*3/MM3 (ref 140–450)
PMV BLD AUTO: 8.9 FL (ref 6–12)
PROTHROMBIN TIME: 36.9 SECONDS (ref 19.4–28.5)
RBC # BLD AUTO: 2.8 10*6/MM3 (ref 3.77–5.28)
VANCOMYCIN PEAK SERPL-MCNC: 39 MCG/ML (ref 20–40)
WBC NRBC COR # BLD: 2.2 10*3/MM3 (ref 3.4–10.8)

## 2019-12-10 PROCEDURE — 85025 COMPLETE CBC W/AUTO DIFF WBC: CPT | Performed by: NURSE PRACTITIONER

## 2019-12-10 PROCEDURE — 25010000002 CEFEPIME PER 500 MG: Performed by: INTERNAL MEDICINE

## 2019-12-10 PROCEDURE — 99232 SBSQ HOSP IP/OBS MODERATE 35: CPT | Performed by: HOSPITALIST

## 2019-12-10 PROCEDURE — 25010000002 VANCOMYCIN 1 G RECONSTITUTED SOLUTION 1 EACH VIAL: Performed by: INTERNAL MEDICINE

## 2019-12-10 PROCEDURE — 85610 PROTHROMBIN TIME: CPT | Performed by: INTERNAL MEDICINE

## 2019-12-10 PROCEDURE — 25010000002 MAGNESIUM SULFATE IN D5W 1G/100ML (PREMIX) 1-5 GM/100ML-% SOLUTION: Performed by: NURSE PRACTITIONER

## 2019-12-10 PROCEDURE — 99233 SBSQ HOSP IP/OBS HIGH 50: CPT | Performed by: INTERNAL MEDICINE

## 2019-12-10 PROCEDURE — 83735 ASSAY OF MAGNESIUM: CPT | Performed by: HOSPITALIST

## 2019-12-10 PROCEDURE — 80202 ASSAY OF VANCOMYCIN: CPT | Performed by: INTERNAL MEDICINE

## 2019-12-10 RX ORDER — MAGNESIUM SULFATE 1 G/100ML
3 INJECTION INTRAVENOUS ONCE
Status: COMPLETED | OUTPATIENT
Start: 2019-12-10 | End: 2019-12-10

## 2019-12-10 RX ADMIN — MAGNESIUM SULFATE HEPTAHYDRATE 3 G: 1 INJECTION, SOLUTION INTRAVENOUS at 15:06

## 2019-12-10 RX ADMIN — MAGNESIUM OXIDE TAB 400 MG (241.3 MG ELEMENTAL MG) 400 MG: 400 (241.3 MG) TAB at 08:43

## 2019-12-10 RX ADMIN — PREGABALIN 100 MG: 100 CAPSULE ORAL at 20:37

## 2019-12-10 RX ADMIN — Medication 10 ML: at 20:36

## 2019-12-10 RX ADMIN — ATORVASTATIN CALCIUM 20 MG: 20 TABLET, FILM COATED ORAL at 20:37

## 2019-12-10 RX ADMIN — POTASSIUM CHLORIDE 40 MEQ: 1500 TABLET, EXTENDED RELEASE ORAL at 20:37

## 2019-12-10 RX ADMIN — PREGABALIN 100 MG: 100 CAPSULE ORAL at 15:55

## 2019-12-10 RX ADMIN — TRIAMTERENE AND HYDROCHLOROTHIAZIDE 1 TABLET: 37.5; 25 TABLET ORAL at 08:43

## 2019-12-10 RX ADMIN — Medication: at 08:17

## 2019-12-10 RX ADMIN — Medication 1 CAPSULE: at 08:43

## 2019-12-10 RX ADMIN — PREGABALIN 100 MG: 100 CAPSULE ORAL at 08:45

## 2019-12-10 RX ADMIN — CEFEPIME HYDROCHLORIDE 2 G: 2 INJECTION, POWDER, FOR SOLUTION INTRAVENOUS at 04:11

## 2019-12-10 RX ADMIN — MAGNESIUM OXIDE TAB 400 MG (241.3 MG ELEMENTAL MG) 400 MG: 400 (241.3 MG) TAB at 20:37

## 2019-12-10 RX ADMIN — CEFEPIME HYDROCHLORIDE 2 G: 2 INJECTION, POWDER, FOR SOLUTION INTRAVENOUS at 20:36

## 2019-12-10 RX ADMIN — CEFEPIME HYDROCHLORIDE 2 G: 2 INJECTION, POWDER, FOR SOLUTION INTRAVENOUS at 11:57

## 2019-12-10 RX ADMIN — Medication 10 ML: at 08:44

## 2019-12-10 RX ADMIN — SERTRALINE HYDROCHLORIDE 50 MG: 50 TABLET ORAL at 17:18

## 2019-12-10 RX ADMIN — SODIUM CHLORIDE 1000 MG: 900 INJECTION, SOLUTION INTRAVENOUS at 05:05

## 2019-12-10 RX ADMIN — FAMOTIDINE 40 MG: 20 TABLET ORAL at 08:43

## 2019-12-10 RX ADMIN — POTASSIUM CHLORIDE 40 MEQ: 1500 TABLET, EXTENDED RELEASE ORAL at 06:12

## 2019-12-10 NOTE — PAYOR COMM NOTE
Inpatient auth request patient arrived through our ER on 12/8 see attached PA form and Clinical. Thank you.    INPATIENT ADMISSION ORDER VERIFIED   ER ADMIT 12/8    PATIENT C/O FEVER, UNIDENTIFIED SOURCE / CHILLS /   DX: NEUTROPENIC FEVER D70.9 , R50.81 / MALIGNANT NEOPLASM OF RIGHT OVARY   C56.1  / LEFT UPPER EXTREMITY DVT I82.622  / HYPOMAGNESEMIA  E83.42 / CURRENTLY RECEIVING  CHEMO TX   Sepsis and Other Febrile Illness, without Focal Infection  ORG: M-160 ([facility])     Clinical Indications for Admission to Inpatient Care  Return to top of Sepsis and Other Febrile Illness, without Focal Infection - ISC  Note: For patients with clinically active secondary conditions, see Sepsis and Other Febrile Illness, without Focal Infection Multiple Condition Management Guidelines.  • Admission is indicated for 1 or more of the following[A][B](6)(7)(8)(9)(10)(11)(12):  • Failure of outpatient treatment                     Failure of outpatient treatment as indicated by 1 or more of the following:  • Insufficient improvement (or worsening) of signs or symptoms despite adherence to appropriate outpatient regimen of sufficient duration      • New coagulopathy (eg, reduced platelet count consistent with disseminated intravascular coagulation)  • Parenteral antimicrobial regimen that must be implemented on inpatient basis (eg, infusion or monitoring needs beyond capabilities of outpatient parenteral therapy)  • Strict or protective (eg, laminar flow) isolation indicated  • Other condition, treatment, or monitoring requiring inpatient admission      Medical Oncology GRG  GRG: PG-ONC ([facility] GRG)        Severe electrolyte abnormalities as indicated by ALL of the following(1)(2)(3):  • Electrolytes and associated findings are not as expected for patient baseline or acceptable treatment effects.  • Severe abnormalities as indicated by 1 or more of the following:  o Magnesium less than 1 mg/dL (0.41 mmol/L)  o Magnesium less than 1.5 mg/dL  "(0.62 mmol/L) with 1 or more of the following:  - Patient unresponsive to outpatient and emergency treatment  - Prolonged QTc (ie, at risk for arrhythmia)  - Significant symptoms or findings (eg, Altered mental status, muscle spasms, seizures, breathing difficulty, cardiac abnormality (eg, arrhythmia, conduction disturbance))  - Associated hypokalemia (potassium less than 3 mEq/L (mmol/L)) with risk of arrhythmia    PAST HX: OVARIAN CANCER 20 YEARS AND REOCCURING 2016  / CURRENTLY RECEIVING  CHEMO TX  CXR CLEAR  LABS: WBC 2.3 / BLOOD CX PENDING FINAL BUT SHOWING NO GROWTH /  MAG 1.1  /  RBC 2.80 / HGB 10.0 / HCT 29.5/  PLTS 63   MEDS: IV VANC / IV CEFEPIME  VS: HIGHSEST TEMP RECORDED 100.3 / PULSE 62 /  RR 16 / /76  / 98% RA           Ladi Ruth RN  Utilization Review          [address]  [address] [phone] Office  [phone] Fax  [email or web address]       Tahira Zimmerman (64 y.o. Female)     Date of Birth Social Security Number Address Home Phone MRN    1955  [id number] [facility] [facility] [place] [address] [phone] [id number]    Zoroastrianism Marital Status          Seventh Day Sabianism        Admission Date Admission Type Admitting Provider Attending Provider Department, Room/Bed    12/8/19 Emergency Inez Gudino MD Hall, Kelli G, MD [facility] 2B MEDICAL INPATIENT, 221/1    Discharge Date Discharge Disposition Discharge Destination                       Attending Provider:  Inez Gudino MD    Allergies:  Morphine, Penicillin V Potassium, Sulfa Antibiotics, Levaquin [Levofloxacin], Amoxicillin, Norco [Hydrocodone-acetaminophen]    Isolation:  None   Infection:  None   Code Status:  CPR    Ht:  165.1 cm (65\")   Wt:  81.4 kg (179 lb 7.3 oz)    Admission Cmt:  None   Principal Problem:  Neutropenic fever (CMS/HCC) [D70.9,R50.81] More...                 Active Insurance as of 12/8/2019     " Primary Coverage     Payor Plan Insurance Group Employer/Plan Group    [employer]HE[employer]ICA[employer] [id number]     Payor Plan Address Payor Plan Phone Number Payor Plan Fax Number Effective Dates    MAIL STOP:   2017 - None Entered    [address] [address]       [facility] [id number]       Subscriber Name Subscriber Birth Date Member ID       TAHIRA ROBLERO 1955 [id number]                 Emergency Contacts      (Rel.) Home Phone Work Phone Mobile Phone    DANIELLE MOLINA (Daughter) -- -- [phone]               History & Physical      ParkJacky MD at 19 1552            [facility]   HISTORY AND PHYSICAL    Patient Name: Tahira Roblero  : 1955  MRN: [id number]  Primary Care Physician: Noemy Queen MD  Date of admission: 2019      Subjective   Subjective     Chief Complaint: fever    HPI:   Tahira Roblero is a 64 y.o. female patient who has been taking chemotherapy since  after discovering recurrent ovarian cancer.  Initially she had the disease in . She has been cancer free almost 20 years. So far she has got 14 cycles of chemotherapy. The last one was 2 weeks ago. She came to ER due to fever and chills that started couple of days ago. She has some cough but no phlegm. CXR is clean. No urinary Symptoms. No diarrhea.   WBC is 2.3.   She doesn't appear to be septic. Her general condition is good.  Vanco/Cefepime was started in ER and it will be continued.      Review of Systems   Constitutional: Positive for fatigue and fever.   HENT: Negative.    Eyes: Negative.    Respiratory: Positive for cough.    Cardiovascular: Negative.    Gastrointestinal: Negative.    Endocrine: Negative.    Genitourinary: Negative.    Musculoskeletal: Negative.    Skin: Negative.    Allergic/Immunologic: Negative.    Neurological: Negative.    Hematological: Negative.    Psychiatric/Behavioral: Negative.           Personal History     Past  Medical History:   Diagnosis Date   • Anemia 2013   • Cervical disc disorder 2013    Herniated disc, pinched nerve   • Clotting disorder (CMS/HCC) 2013    Low platelets from chemo   • Colon polyp 2013   • Deep vein thrombosis (CMS/HCC) 2013   • Diverticulosis 2013   • GERD (gastroesophageal reflux disease) 2016   • HL (hearing loss) 2016    I need hearing aids   • Hyperlipidemia 2013    Need my cholesterol rechecked   • Hypertension    • Joint pain 2013    Shoulder pain, torn rotator cuff   • Low back pain 2013   • Neuromuscular disorder (CMS/[facility]) 2015    Shingles, pinched nerves in my neck   • Osteopenia 2014   • Osteoporosis    • Ovarian cancer (CMS/[facility])    • Pneumonia 2010    Have had it several times   • Spinal stenosis 2013    Cervical   • Urinary tract infection 2013    Have had several utis       Past Surgical History:   Procedure Laterality Date   • COLON SURGERY     • COLONOSCOPY     • EXPLORATORY LAPAROTOMY     • HERNIA REPAIR     • HYSTERECTOMY     • OOPHORECTOMY         Family History: family history includes Cancer in her father; Hypertension in her brother, father, mother, and sister; Stroke in her brother. Otherwise pertinent FHx was reviewed and unremarkable.     Social History:  reports that she has never smoked. She has never used smokeless tobacco. She reports that she does not drink alcohol or use drugs.      Medications:  Medications Prior to Admission   Medication Sig Dispense Refill Last Dose   • acetaminophen (TYLENOL) 500 MG tablet Take 1,000 mg by mouth Every 6 (Six) Hours As Needed for Mild Pain .      • cyanocobalamin 1000 MCG/ML injection Inject 1,000 mcg into the appropriate muscle as directed by prescriber Every 28 (Twenty-Eight) Days.   11/24/2019   • famotidine (PEPCID) 40 MG tablet Take 40 mg by mouth Daily.      • LYRICA 100 MG capsule Take 1 capsule by mouth 3 (Three) Times a Day. 90 capsule 2 12/8/2019 at Unknown time   • magnesium oxide (MAG-OX) 400 MG tablet Take 400 mg by  mouth 2 (Two) Times a Day.      • ondansetron ODT (ZOFRAN-ODT) 8 MG disintegrating tablet Take 8 mg by mouth Every 8 (Eight) Hours As Needed for Nausea or Vomiting.      • oxyCODONE (ROXICODONE) 10 MG tablet Take 1 tablet by mouth 3 (Three) Times a Day As Needed for Moderate Pain . 90 tablet 0 12/8/2019 at Unknown time   • potassium chloride (K-DUR,KLOR-CON) 20 MEQ CR tablet Take 60 mEq by mouth Every Morning.      • potassium chloride (K-DUR,KLOR-CON) 20 MEQ CR tablet Take 40 mEq by mouth Every Night.      • sertraline (ZOLOFT) 50 MG tablet Take 50 mg by mouth Every Evening.      • temazepam (RESTORIL) 30 MG capsule Take 30 mg by mouth At Night As Needed for Sleep.      • triamterene-hydrochlorothiazide (DYAZIDE) 37.5-25 MG per capsule Take 1 capsule by mouth Every Morning.      • warfarin (COUMADIN) 6 MG tablet Take 6 mg by mouth Daily.      • atorvastatin (LIPITOR) 20 MG tablet Take 20 mg by mouth every night at bedtime.  3 Taking   • hydrochlorothiazide (HYDRODIURIL) 25 MG tablet Take 25 mg by mouth Daily.   Taking   • promethazine (PHENERGAN) 25 MG tablet Take 1 tablet by mouth Every 6 (Six) Hours As Needed for Nausea or Vomiting. 12 tablet 0 Taking           Allergies   Allergen Reactions   • Morphine Nausea Only and Hives   • Penicillin V Potassium Rash   • Sulfa Antibiotics Rash   • Levaquin [Levofloxacin] Nausea Only and Dizziness     When taken with Coumadin   • Amoxicillin Rash   • Norco [Hydrocodone-Acetaminophen] Rash       Objective   Objective     Vital Signs:   Temp:  [98.6 °F (37 °C)-99.1 °F (37.3 °C)] 98.6 °F (37 °C)  Heart Rate:  [62-72] 62  Resp:  [15-20] 18  BP: (120-127)/(65-80) 120/80        Physical Exam   Constitutional: She is oriented to person, place, and time. She appears well-developed.   HENT:   Head: Normocephalic and atraumatic.   Eyes: Pupils are equal, round, and reactive to light. EOM are normal.   Neck: Normal range of motion. Neck supple.   Cardiovascular: Normal rate and  regular rhythm.   Pulmonary/Chest: Effort normal and breath sounds normal.   Abdominal: Soft. Bowel sounds are normal.   Musculoskeletal: Normal range of motion.   Neurological: She is alert and oriented to person, place, and time.   Skin: Skin is warm and dry.   Psychiatric: She has a normal mood and affect. Her behavior is normal.          Results Reviewed:  I have personally reviewed most recent lab results and agree with findings, most notably: .      Results from last 7 days   Lab Units 12/08/19  1039   WBC 10*3/mm3 2.30*   HEMOGLOBIN g/dL 10.4*   HEMATOCRIT % 31.0*   PLATELETS 10*3/mm3 59*   INR  3.05*     Results from last 7 days   Lab Units 12/08/19  1042 12/08/19  1039   SODIUM mmol/L  --  137   POTASSIUM mmol/L  --  3.5   CHLORIDE mmol/L  --  102   CO2 mmol/L  --  26.0   BUN mg/dL  --  14   CREATININE mg/dL  --  0.84   GLUCOSE mg/dL  --  92   CALCIUM mg/dL  --  8.5*   ALT (SGPT) U/L  --  7   AST (SGOT) U/L  --  17   TROPONIN T ng/mL  --  <0.010   PROBNP pg/mL  --  138.3   LACTATE mmol/L 0.5  --    PROCALCITONIN ng/mL  --  0.29*     Estimated Creatinine Clearance: 71.9 mL/min (by C-G formula based on SCr of 0.84 mg/dL).  Brief Urine Lab Results  (Last result in the past 365 days)      Color   Clarity   Blood   Leuk Est   Nitrite   Protein   CREAT   Urine HCG        12/08/19 1140 Yellow Clear Negative Negative Negative Negative             Imaging Results (Last 24 Hours)     Procedure Component Value Units Date/Time    XR Chest 2 View [[id number]] Collected:  12/08/19 1123     Updated:  12/08/19 1126    Narrative:       DATE OF EXAM:  12/8/2019 11:17 AM     PROCEDURE:  XR CHEST 2 VW-     INDICATIONS:  CHF/COPD Protocol patient's a 64-year-old female with fever chills and  headache     COMPARISON:  Study of 08/30/2019     TECHNIQUE:   Two radiologic views of the chest , PA and lateral were obtained.     FINDINGS:  Today's study of the chest was obtained on 12/08/2019 11:20 AM.     The heart size is normal. A  left Dibeyp-d-Bnyu is present with tip in  the superior vena cava. The lung fields are clear. The diaphragmatic  surfaces are smooth. The bony structures demonstrate only degenerative  change.        Impression:          1. No active cardiopulmonary disease  2. Left-sided Oagakt-j-Qwxr remains in good position.  3. No significant change from 08/30/2019     Electronically Signed By-Parminder Elmore Jr. On:12/8/2019 11:24 AM  This report was finalized on [id number] by  Parminder Elmore Jr., .        Results for orders placed during the hospital encounter of 10/01/19   Adult Transthoracic Echo Complete W/ Cont if Necessary Per Protocol    Narrative · Left atrial cavity size is borderline dilated.       Normal LV size and contractility EF of 60-65%  Normal RV size  Borderline left atrial enlargement  Aortic valve, mitral valve, tricuspid valve appears structurally normal,   no significant regurgitation seen.  No pericardial effusion seen.  Proximal aorta appears normal in size.         Assessment/Plan   Assessment / Plan     Brief Patient Summary:  Tahira Zimmerman is a 64 y.o. female who admitted for the evaluation of neutropenic fever.    Active Hospital Problems:  Neutropenic fever (CMS/HCC)- (present on admission)   Known recurrent ovarian cancer  14 cycles of Chemotherapy at this moment  Fever chills.  No clear source of infection clinically  WBC 2.3  Vanco/Cefepime started    Oncology consult in AM  Consider ID consult if fever not subsided soon.    Malignant neoplasm of right ovary (CMS/HCC)- (present on admission)   Ovarian cancer, initially diagnosed in 1996  Surgery/Chemotherapy dose at that time  Recurrent  Disease 4 years ago.  Chemo started - total 14 cycles till current date.  Good general condition.    Left upper extremity deep vein thrombosis (CMS/HCC)- (present on admission)  She is taking on Coumadin.  As well she has a Port on left chest - need chronic anticoagulation                 DVT prophylaxis:   She is on Coumadin    CODE STATUS:    Code Status and Medical Interventions:   Ordered at: 12/08/19 1537     Level Of Support Discussed With:    Patient     Code Status:    CPR     Medical Interventions (Level of Support Prior to Arrest):    Full       Admission Status:  I believe this patient meets inpatient criteria.    Electronically signed by Jacky Barraza MD, 12/08/19, 3:52 PM.        Electronically signed by Jacky Barraza MD at 12/08/19 1559          Physician Progress Notes (last 48 hours) (Notes from 12/08/19 1212 through 12/10/19 1212)      Inez Gudino MD at 12/09/19 1709                [facility] Medicine Services Daily Progress Note      Hospitalist Team  LOS 1 days      Patient Care Team:  Noemy Queen MD as PCP - General (Family Medicine)  Daryn Tay MD as Consulting Physician (Hematology and Oncology)    Patient Location: 221/1      Subjective   Subjective     Chief Complaint / Subjective  Chief Complaint   Patient presents with   • Fever       Present on Admission:  • Neutropenic fever (CMS/HCC)  • Malignant neoplasm of right ovary (CMS/HCC)  • Left upper extremity deep vein thrombosis (CMS/HCC)  • Hypomagnesemia  • Essential hypertension  • Restless leg syndrome  • Hyperlipidemia      Brief Synopsis of Hospital Course/HPI    Chief Complaint: fever    HPI:   Tahira Zimmerman is a 64 y.o. female patient who has been taking chemotherapy since 2016 after discovering recurrent ovarian cancer.  Initially she had the disease in 1996. She has been cancer free almost 20 years. So far she has got 14 cycles of chemotherapy. The last one was 2 weeks ago. She came to ER due to fever and chills that started couple of days ago. She has some cough but no phlegm. CXR is clean. No urinary Symptoms. No diarrhea.  WBC is 2.3.   She doesn'tappear to be septic. Vanco/Cefepime were started in ER and continued upon admission until blood cultures are final (no growth so far and  "pending).  Respiratory virus panel was negative.    Review of systems:  12 point review of systems was reviewed and was negative except as above.          Date: 12/9/2019: The patient reports feeling much better.  She has no further shortness of breath and her cough is significantly improved.  She is no longer congested.  No GI or  complaints.        ROS      Objective   Objective      Vital Signs  Temp:  [97.6 °F (36.4 °C)-100.3 °F (37.9 °C)] 97.6 °F (36.4 °C)  Heart Rate:  [51-65] 51  Resp:  [16-18] 18  BP: (115-132)/(66-83) 122/70  Oxygen Therapy  SpO2: 98 %  Pulse Oximetry Type: Intermittent  Device (Oxygen Therapy): room air  Flowsheet Rows      First Filed Value   Admission Height  165.1 cm (65\") Documented at 12/08/2019 0949   Admission Weight  82.8 kg (182 lb 8.7 oz) Documented at 12/08/2019 0949        Intake & Output (last 3 days)       12/06 0701 - 12/07 0700 12/07 0701 - 12/08 0700 12/08 0701 - 12/09 0700 12/09 0701 - 12/10 0700    P.O.    1040    Total Intake(mL/kg)    1040 (12.7)    Net    +1040                Lines, Drains & Airways    Active LDAs     Name:   Placement date:   Placement time:   Site:   Days:    Single Lumen Implantable Port 12/08/19 Left Chest   12/08/19    1140    Chest   1                  Physical Exam:    Well-developed well-nourished female comfortable on room air in no acute distress sitting up in bed awake and alert; mucous membranes moist; lungs clear to auscultation bilaterally; CV regular rate and rhythm; abdomen soft nontender nondistended with active bowel sounds; extremities with no edema, cyanosis or calf tenderness; palpable pedal pulses bilaterally; neurologic exam grossly nonfocal; no [place] catheter.      Procedures:              Results Review:     I reviewed the patient's new clinical results.      Lab Results (last 24 hours)     Procedure Component Value Units Date/Time    Blood Culture - Blood, Blood, PICC Line [[id number]] Collected:  12/08/19 1039    Specimen:  " Blood, PICC Line Updated:  12/09/19 1112     Blood Culture No growth at 24 hours    Blood Culture - Blood, Arm, Right [[id number]] Collected:  12/08/19 1031    Specimen:  Blood from Arm, Right Updated:  12/09/19 1111     Blood Culture No growth at 24 hours    Basic Metabolic Panel [[id number]]  (Normal) Collected:  12/09/19 0402    Specimen:  Blood Updated:  12/09/19 0545     Glucose 81 mg/dL      BUN 11 mg/dL      Creatinine 0.70 mg/dL      Sodium 142 mmol/L      Potassium 3.6 mmol/L      Chloride 107 mmol/L      CO2 26.0 mmol/L      Calcium 8.6 mg/dL      eGFR Non African Amer 84 mL/min/1.73      BUN/Creatinine Ratio 15.7     Anion Gap 9.0 mmol/L     Narrative:       GFR Normal >60  Chronic Kidney Disease <60  Kidney Failure <15      Magnesium [[id number]]  (Abnormal) Collected:  12/09/19 0402    Specimen:  Blood Updated:  12/09/19 0545     Magnesium 1.4 mg/dL     Protime-INR [[id number]]  (Abnormal) Collected:  12/09/19 0402    Specimen:  Blood Updated:  12/09/19 0531     Protime 40.0 Seconds      INR 4.26    CBC (No Diff) [[id number]]  (Abnormal) Collected:  12/09/19 0402    Specimen:  Blood Updated:  12/09/19 0518     WBC 2.00 10*3/mm3      RBC 2.64 10*6/mm3      Hemoglobin 9.4 g/dL      Hematocrit 28.1 %      .5 fL      MCH 35.5 pg      MCHC 33.4 g/dL      RDW 16.9 %      RDW-SD 63.9 fl      MPV 9.4 fL      Platelets 54 10*3/mm3     Protime-INR [[id number]]  (Abnormal) Collected:  12/08/19 1646    Specimen:  Blood Updated:  12/08/19 1749     Protime 30.0 Seconds      INR 3.19        No results found for: HGBA1C  Results from last 7 days   Lab Units 12/09/19  0402 12/08/19  1646 12/08/19  1039   INR  4.26* 3.19* 3.05*               Microbiology Results (last 10 days)     Procedure Component Value - Date/Time    Blood Culture - Blood, Blood, PICC Line [[id number]] Collected:  12/08/19 1039    Lab Status:  Preliminary result Specimen:  Blood, PICC Line Updated:  12/09/19 1112     Blood Culture No growth at 24  hours    Respiratory Panel, PCR - Swab, Nasopharynx [[id number]]  (Normal) Collected:  12/08/19 1037    Lab Status:  Final result Specimen:  Swab from Nasopharynx Updated:  12/08/19 1156     ADENOVIRUS, PCR Not Detected     Coronavirus 229E Not Detected     Coronavirus HKU1 Not Detected     Coronavirus NL63 Not Detected     Coronavirus OC43 Not Detected     Human Metapneumovirus Not Detected     Human Rhinovirus/Enterovirus Not Detected     Influenza B PCR Not Detected     Parainfluenza Virus 1 Not Detected     Parainfluenza Virus 2 Not Detected     Parainfluenza Virus 3 Not Detected     Parainfluenza Virus 4 Not Detected     Bordetella pertussis pcr Not Detected     Influenza A H1 2009 PCR Not Detected     Chlamydophila pneumoniae PCR Not Detected     Mycoplasma pneumo by PCR Not Detected     Influenza A PCR Not Detected     Influenza A H3 Not Detected     Influenza A H1 Not Detected     RSV, PCR Not Detected    Blood Culture - Blood, Arm, Right [[id number]] Collected:  12/08/19 1031    Lab Status:  Preliminary result Specimen:  Blood from Arm, Right Updated:  12/09/19 1111     Blood Culture No growth at 24 hours          ECG/EMG Results (most recent)     None               Results for orders placed during the hospital encounter of 10/01/19   Adult Transthoracic Echo Complete W/ Cont if Necessary Per Protocol    Narrative · Left atrial cavity size is borderline dilated.       Normal LV size and contractility EF of 60-65%  Normal RV size  Borderline left atrial enlargement  Aortic valve, mitral valve, tricuspid valve appears structurally normal,   no significant regurgitation seen.  No pericardial effusion seen.  Proximal aorta appears normal in size.         Xr Chest 2 View    Result Date: 12/8/2019   1. No active cardiopulmonary disease 2. Left-sided Xcptgr-g-Fjtm remains in good position. 3. No significant change from 08/30/2019  Electronically Signed By-Parminder Elmore Jr. On:12/8/2019 11:24 AM This report was  finalized on [id number] by  Parminder Elmore Jr., .      Xrays, labs reviewed personally by physician.    Medication Review:   I have reviewed the patient's current medication list      Scheduled Meds    [START ON 12/10/2019] !Vancomycin Level Draw Needed  Does not apply Once   [START ON 12/10/2019] !Vancomycin Level Draw Needed  Does not apply Once   atorvastatin 20 mg Oral Nightly   cefepime 2 g Intravenous Q8H   [START ON 12/22/2019] cyanocobalamin 1,000 mcg Intramuscular Q28 Days   famotidine 40 mg Oral Daily   lactobacillus acidophilus 1 capsule Oral Daily   magnesium oxide 400 mg Oral BID   potassium chloride 40 mEq Oral QAM   potassium chloride 40 mEq Oral Nightly   pregabalin 100 mg Oral TID   sertraline 50 mg Oral Q PM   sodium chloride 10 mL Intravenous Q12H   triamterene-hydrochlorothiazide 1 tablet Oral Daily   vancomycin 15 mg/kg (Adjusted) Intravenous Q12H       Meds Infusions    Pharmacy to dose vancomycin    Pharmacy to dose warfarin        Meds PRN  acetaminophen  •  ondansetron ODT  •  oxyCODONE  •  Pharmacy to dose vancomycin  •  Pharmacy to dose warfarin  •  promethazine  •  sodium chloride  •  temazepam        Assessment/Plan   Assessment/Plan     Active Hospital Problems    Diagnosis  POA   • **Neutropenic fever (CMS/HCC) [D70.9, R50.81]  Yes     Priority: High   • Malignant neoplasm of right ovary (CMS/HCC) [C56.1]  Yes     Priority: High   • Left upper extremity deep vein thrombosis (CMS/HCC) [I82.622]  Yes     Priority: Medium   • Hypomagnesemia [E83.42]  Yes     Priority: Medium   • Essential hypertension [I10]  Yes     Priority: Medium   • Restless leg syndrome [G25.81]  Yes   • Hyperlipidemia [E78.5]  Yes      Resolved Hospital Problems   No resolved problems to display.         Active Hospital Problems:  * Neutropenic fever (CMS/HCC)- (present on admission)  Patient had fever and chills preadmission but no clear source of infection identified.  Respiratory virus panel negative.  Blood  cultures x2 negative and pending.    Malignant neoplasm of right ovary (CMS/HCC)- (present on admission)   Ovarian cancer, initially diagnosed in 1996  Surgery/Chemotherapy dose at that time  Recurrent  Disease 4 years ago.  Chemo started - total 14 cycles till current date.  Good general condition.    Left upper extremity deep vein thrombosis (CMS/HCC)- (present on admission)  She is taking on Coumadin.  As well she has a Port on left chest - need chronic anticoagulation     Hypomagnesemia- (present on admission)  Acute on chronic  -Continue oral replacement     Hyperlipidemia- (present on admission)   Continue atorvastatin            Resolved Hospital Problems:  No notes have been filed under this hospital service.  Service: Hospitalist            VTE Prophylaxis - SCDs.      Code Status -   Code Status and Medical Interventions:   Ordered at: 12/08/19 6023     Level Of Support Discussed With:    Patient     Code Status:    CPR     Medical Interventions (Level of Support Prior to Arrest):    Full       Discharge Planning    Destination      Coordination has not been started for this encounter.      Durable Medical Equipment      Coordination has not been started for this encounter.      Dialysis/Infusion      Coordination has not been started for this encounter.      Home Medical Care      Coordination has not been started for this encounter.      Therapy      Coordination has not been started for this encounter.      Community Resources      Coordination has not been started for this encounter.            Electronically signed by Inez Gudino MD, 12/09/19, 5:10 PM.  [facility] Hospitalist Team        Electronically signed by Inez Gudino MD at 12/09/19 1712          Consult Notes (last 48 hours) (Notes from 12/08/19 1212 through 12/10/19 1212)      Mira Jeffrey APRN at 12/09/19 1120      Consult Orders    1. Inpatient Hematology & Oncology Consult [[id number]] ordered by Jacky Barraza MD at 12/08/19  1544                  Hematology/Oncology Inpatient Consultation    Patient name: Tahira Zimmerman  : 1955  MRN: [id number]  Referring Provider: Dr. SHAY Barraza  Reason for Consultation: Cytopenia and recurrent ovarian cancer.    Chief complaint: Fever    History of present illness:    64 y.o. female who was admitted through the ER on 2019 complaining of fever and weakness for a few days.  She had a nonproductive cough.  Her temperature had been as high as 102.  On admission, she was found to be pancytopenic but did not require blood transfusions.  She was cultured and chest x-ray was negative.  She was started on vancomycin and cefepime.  ANC was 1.6.  INR 3.05-on Coumadin as outpatient for a left upper extremity DVT.    19  Hematology/Oncology was consulted as she is an established patient who we follow for her recurrent ovarian cancer.  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  She developed recurrent disease, intra-abdominally, 6 months later, requiring intra-abdominal peritoneal chemotherapy.  She was disease-free until  when she underwent exploratory laparotomy, omentectomy, bowel resection, liver biopsy and appendectomy at [facility].  Metastatic serous carcinoma was found to involve the colon, omentum, rectum and vaginal mass with positive margins.  Liver biopsy was negative.  She completed 7 cycles of chemotherapy in  and was in remission on CT scan.  She recurred in  and was started on chemotherapy followed by disease progression.  Since then, she has been on multiple lines of therapy with very short breaks between treatments.  Status post cycle 11 chemotherapy with Doxil/carboplatin plus Neulasta support on 2019.  -DVT of the left upper extremity-diagnosed in 2013.  Treated with long-term Coumadin.  -Pernicious anemia  diagnosed March 2016 and MELANI poor p.o. iron absorption diagnosed in March 2018 with stool heme negative.  Intrinsic factor antibody was positive and parietal cell antibody was negative.  She is on long-term B12 injections monthly.  Day 8 Injectafer given on 9/6/2019 and started on retacrit 40,000 units weekly on 10/25/2019-last given 12/6.  Office visit 12/6/2019 with no complaints.  WBC 4.02, hemoglobin 9.3, .2 and platelets 61K.  CT scans October 29, 2019 showed a new 2 mm nodule in the left lower lobe and a stable nodule in the left lower lobe.  She had stable soft tissue density and calcified nodules in the ventral abdominal wall and left retroperitoneal fat consistent with nonviable metastatic disease.  Weekly magnesium replacement continued.  INR was 2.0.  She was continued on monthly B12 injections. Retacrit and chemotherapy was continued.    PCP: Noemy Queen MD    History:  Past Medical History:   Diagnosis Date   • Anemia 2013   • Cervical disc disorder 2013    Herniated disc, pinched nerve   • Clotting disorder (CMS/HCC) 2013    Low platelets from chemo   • Colon polyp 2013   • Deep vein thrombosis (CMS/HCC) 2013   • Diverticulosis 2013   • GERD (gastroesophageal reflux disease) 2016   • HL (hearing loss) 2016    I need hearing aids   • Hyperlipidemia 2013    Need my cholesterol rechecked   • Hypertension    • Joint pain 2013    Shoulder pain, torn rotator cuff   • Low back pain 2013   • Neuromuscular disorder (CMS/HCC) 2015    Shingles, pinched nerves in my neck   • Osteopenia 2014   • Osteoporosis    • Ovarian cancer (CMS/HCC)    • Pneumonia 2010    Have had it several times   • Spinal stenosis 2013    Cervical   • Urinary tract infection 2013    Have had several utis   , Past Surgical History:   Procedure Laterality Date   • COLON SURGERY     • COLONOSCOPY     • EXPLORATORY LAPAROTOMY     • HERNIA REPAIR     • HYSTERECTOMY     • OOPHORECTOMY     , Family History   Problem Relation Age of  Onset   • Hypertension Mother    • Cancer Father    • Hypertension Father    • Hypertension Sister    • Hypertension Brother    • Stroke Brother    , Social History     Tobacco Use   • Smoking status: Never Smoker   • Smokeless tobacco: Never Used   Substance Use Topics   • Alcohol use: No     Frequency: Never   • Drug use: No   , Medications Prior to Admission   Medication Sig Dispense Refill Last Dose   • acetaminophen (TYLENOL) 500 MG tablet Take 1,000 mg by mouth Every 6 (Six) Hours As Needed for Mild Pain .      • cyanocobalamin 1000 MCG/ML injection Inject 1,000 mcg into the appropriate muscle as directed by prescriber Every 28 (Twenty-Eight) Days.   11/24/2019   • famotidine (PEPCID) 40 MG tablet Take 40 mg by mouth Daily.      • LYRICA 100 MG capsule Take 1 capsule by mouth 3 (Three) Times a Day. 90 capsule 2 12/8/2019 at Unknown time   • magnesium oxide (MAG-OX) 400 MG tablet Take 400 mg by mouth 2 (Two) Times a Day.      • ondansetron ODT (ZOFRAN-ODT) 8 MG disintegrating tablet Take 8 mg by mouth Every 8 (Eight) Hours As Needed for Nausea or Vomiting.      • oxyCODONE (ROXICODONE) 10 MG tablet Take 1 tablet by mouth 3 (Three) Times a Day As Needed for Moderate Pain . 90 tablet 0 12/8/2019 at Unknown time   • potassium chloride (K-DUR,KLOR-CON) 20 MEQ CR tablet Take 60 mEq by mouth Every Morning.      • potassium chloride (K-DUR,KLOR-CON) 20 MEQ CR tablet Take 40 mEq by mouth Every Night.      • sertraline (ZOLOFT) 50 MG tablet Take 50 mg by mouth Every Evening.      • temazepam (RESTORIL) 30 MG capsule Take 30 mg by mouth At Night As Needed for Sleep.      • triamterene-hydrochlorothiazide (DYAZIDE) 37.5-25 MG per capsule Take 1 capsule by mouth Every Morning.      • warfarin (COUMADIN) 6 MG tablet Take 6 mg by mouth Daily.      • atorvastatin (LIPITOR) 20 MG tablet Take 20 mg by mouth every night at bedtime.  3 Taking   • promethazine (PHENERGAN) 25 MG tablet Take 1 tablet by mouth Every 6 (Six) Hours As  "Needed for Nausea or Vomiting. 12 tablet 0 Taking   , Scheduled Meds:    [START ON 12/10/2019] !Vancomycin Level Draw Needed  Does not apply Once   [START ON 12/10/2019] !Vancomycin Level Draw Needed  Does not apply Once   atorvastatin 20 mg Oral Nightly   cefepime 2 g Intravenous Q8H   [START ON 12/22/2019] cyanocobalamin 1,000 mcg Intramuscular Q28 Days   famotidine 40 mg Oral Daily   lactobacillus acidophilus 1 capsule Oral Daily   magnesium oxide 400 mg Oral Daily   potassium chloride 40 mEq Oral QAM   potassium chloride 40 mEq Oral Nightly   pregabalin 100 mg Oral TID   sertraline 50 mg Oral Q PM   sodium chloride 10 mL Intravenous Q12H   triamterene-hydrochlorothiazide 1 tablet Oral Daily   vancomycin 15 mg/kg (Adjusted) Intravenous Q12H   , Continuous Infusions:    Pharmacy to dose vancomycin    Pharmacy to dose warfarin    , PRN Meds:  acetaminophen  •  ondansetron ODT  •  oxyCODONE  •  Pharmacy to dose vancomycin  •  Pharmacy to dose warfarin  •  promethazine  •  sodium chloride  •  temazepam   Allergies:  Morphine; Penicillin v potassium; Sulfa antibiotics; Levaquin [levofloxacin]; Amoxicillin; and Norco [hydrocodone-acetaminophen]    ROS:  Review of Systems     Objective     Vital Signs:   /70 (BP Location: Right arm, Patient Position: Lying)   Pulse 51   Temp 97.6 °F (36.4 °C) (Oral)   Resp 18   Ht 165.1 cm (65\")   Wt 81.7 kg (180 lb 1.9 oz)   SpO2 98%   BMI 29.97 kg/m²      Physical Exam:  Physical Exam     Results Review:  Lab Results (last 48 hours)     Procedure Component Value Units Date/Time    Blood Culture - Blood, Blood, PICC Line [[id number]] Collected:  12/08/19 1039    Specimen:  Blood, PICC Line Updated:  12/09/19 1112     Blood Culture No growth at 24 hours    Blood Culture - Blood, Arm, Right [[id number]] Collected:  12/08/19 1031    Specimen:  Blood from Arm, Right Updated:  12/09/19 1111     Blood Culture No growth at 24 hours    Basic Metabolic Panel [[id number]]  (Normal) " Collected:  12/09/19 0402    Specimen:  Blood Updated:  12/09/19 0545     Glucose 81 mg/dL      BUN 11 mg/dL      Creatinine 0.70 mg/dL      Sodium 142 mmol/L      Potassium 3.6 mmol/L      Chloride 107 mmol/L      CO2 26.0 mmol/L      Calcium 8.6 mg/dL      eGFR Non African Amer 84 mL/min/1.73      BUN/Creatinine Ratio 15.7     Anion Gap 9.0 mmol/L     Narrative:       GFR Normal >60  Chronic Kidney Disease <60  Kidney Failure <15      Magnesium [[id number]]  (Abnormal) Collected:  12/09/19 0402    Specimen:  Blood Updated:  12/09/19 0545     Magnesium 1.4 mg/dL     Protime-INR [[id number]]  (Abnormal) Collected:  12/09/19 0402    Specimen:  Blood Updated:  12/09/19 0531     Protime 40.0 Seconds      INR 4.26    CBC (No Diff) [[id number]]  (Abnormal) Collected:  12/09/19 0402    Specimen:  Blood Updated:  12/09/19 0518     WBC 2.00 10*3/mm3      RBC 2.64 10*6/mm3      Hemoglobin 9.4 g/dL      Hematocrit 28.1 %      .5 fL      MCH 35.5 pg      MCHC 33.4 g/dL      RDW 16.9 %      RDW-SD 63.9 fl      MPV 9.4 fL      Platelets 54 10*3/mm3     Protime-INR [[id number]]  (Abnormal) Collected:  12/08/19 1646    Specimen:  Blood Updated:  12/08/19 1749     Protime 30.0 Seconds      INR 3.19    Respiratory Panel, PCR - Swab, Nasopharynx [[id number]]  (Normal) Collected:  12/08/19 1037    Specimen:  Swab from Nasopharynx Updated:  12/08/19 1156     ADENOVIRUS, PCR Not Detected     Coronavirus 229E Not Detected     Coronavirus HKU1 Not Detected     Coronavirus NL63 Not Detected     Coronavirus OC43 Not Detected     Human Metapneumovirus Not Detected     Human Rhinovirus/Enterovirus Not Detected     Influenza B PCR Not Detected     Parainfluenza Virus 1 Not Detected     Parainfluenza Virus 2 Not Detected     Parainfluenza Virus 3 Not Detected     Parainfluenza Virus 4 Not Detected     Bordetella pertussis pcr Not Detected     Influenza A H1 2009 PCR Not Detected     Chlamydophila pneumoniae PCR Not Detected     Mycoplasma  pneumo by PCR Not Detected     Influenza A PCR Not Detected     Influenza A H3 Not Detected     Influenza A H1 Not Detected     RSV, PCR Not Detected    Urinalysis With Culture If Indicated - Urine, Catheter [[id number]]  (Normal) Collected:  12/08/19 1140    Specimen:  Urine, Catheter Updated:  12/08/19 1148     Color, UA Yellow     Appearance, UA Clear     pH, UA 5.5     Specific Gravity, UA 1.021     Glucose, UA Negative     Ketones, UA Negative     Bilirubin, UA Negative     Blood, UA Negative     Protein, UA Negative     Leuk Esterase, UA Negative     Nitrite, UA Negative     Urobilinogen, UA 0.2 E.U./dL    Narrative:       Urine microscopic not indicated.    [place] Draw [[id number]] Collected:  12/08/19 1039    Specimen:  Blood Updated:  12/08/19 1145    Narrative:       The following orders were created for panel order [place] Draw.  Procedure                               Abnormality         Status                     ---------                               -----------         ------                     Light Blue Top[[id number]]                                   Final result               Green Top (Gel)[[id number]]                                  Final result               Lavender Top[[id number]]                                     Final result               Gold Top - SST[[id number]]                                   Final result                 Please view results for these tests on the individual orders.    Light Blue Top [[id number]] Collected:  12/08/19 1039    Specimen:  Blood Updated:  12/08/19 1145     Extra Tube hold for add-on     Comment: Auto resulted       Green Top (Gel) [[id number]] Collected:  12/08/19 1039    Specimen:  Blood Updated:  12/08/19 1145     Extra Tube Hold for add-ons.     Comment: Auto resulted.       Lavender Top [[id number]] Collected:  12/08/19 1039    Specimen:  Blood Updated:  12/08/19 1145     Extra Tube hold for add-on     Comment: Auto resulted       Gold Top - SST  "[[id number]] Collected:  12/08/19 1039    Specimen:  Blood Updated:  12/08/19 1145     Extra Tube Hold for add-ons.     Comment: Auto resulted.       Procalcitonin [[id number]]  (Abnormal) Collected:  12/08/19 1039    Specimen:  Blood Updated:  12/08/19 1121     Procalcitonin 0.29 ng/mL     Narrative:       As a Marker for Sepsis (Non-Neonates):   1. <0.5 ng/mL represents a low risk of severe sepsis and/or septic shock.  1. >2 ng/mL represents a high risk of severe sepsis and/or septic shock.    As a Marker for Lower Respiratory Tract Infections that require antibiotic therapy:  PCT on Admission     Antibiotic Therapy             6-12 Hrs later  > 0.5                Strongly Recommended            >0.25 - <0.5         Recommended  0.1 - 0.25           Discouraged                   Remeasure/reassess PCT  <0.1                 Strongly Discouraged          Remeasure/reassess PCT      As 28 day mortality risk marker: \"Change in Procalcitonin Result\" (> 80 % or <=80 %) if Day 0 (or Day 1) and Day 4 values are available. Refer to http://www.[employer]-pct-calculator.com/   Change in PCT <=80 %   A decrease of PCT levels below or equal to 80 % defines a positive change in PCT test result representing a higher risk for 28-day all-cause mortality of patients diagnosed with severe sepsis or septic shock.  Change in PCT > 80 %   A decrease of PCT levels of more than 80 % defines a negative change in PCT result representing a lower risk for 28-day all-cause mortality of patients diagnosed with severe sepsis or septic shock.                  Comprehensive Metabolic Panel [[id number]]  (Abnormal) Collected:  12/08/19 1039    Specimen:  Blood Updated:  12/08/19 1119     Glucose 92 mg/dL      BUN 14 mg/dL      Creatinine 0.84 mg/dL      Sodium 137 mmol/L      Potassium 3.5 mmol/L      Chloride 102 mmol/L      CO2 26.0 mmol/L      Calcium 8.5 mg/dL      Total Protein 6.3 g/dL      Albumin 3.70 g/dL      ALT (SGPT) 7 U/L      AST (SGOT) 17 " U/L      Alkaline Phosphatase 119 U/L      Total Bilirubin 0.2 mg/dL      eGFR Non African Amer 68 mL/min/1.73      Globulin 2.6 gm/dL      A/G Ratio 1.4 g/dL      BUN/Creatinine Ratio 16.7     Anion Gap 9.0 mmol/L     Narrative:       GFR Normal >60  Chronic Kidney Disease <60  Kidney Failure <15      Troponin [[id number]]  (Normal) Collected:  12/08/19 1039    Specimen:  Blood Updated:  12/08/19 1119     Troponin T <0.010 ng/mL     Narrative:       Troponin T Reference Range:  <= 0.03 ng/mL-   Negative for AMI  >0.03 ng/mL-     Abnormal for myocardial necrosis.  Clinicians would have to utilize clinical acumen, EKG, Troponin and serial changes to determine if it is an Acute Myocardial Infarction or myocardial injury due to an underlying chronic condition.     Magnesium [[id number]]  (Abnormal) Collected:  12/08/19 1039    Specimen:  Blood Updated:  12/08/19 1119     Magnesium 1.3 mg/dL     CK [[id number]]  (Normal) Collected:  12/08/19 1039    Specimen:  Blood Updated:  12/08/19 1119     Creatine Kinase 57 U/L     BNP [[id number]]  (Normal) Collected:  12/08/19 1039    Specimen:  Blood Updated:  12/08/19 1114     proBNP 138.3 pg/mL     Narrative:       Among patients with dyspnea, NT-proBNP is highly sensitive for the detection of acute congestive heart failure. In addition NT-proBNP of <300 pg/ml effectively rules out acute congestive heart failure with 99% negative predictive value.      CBC & Differential [[id number]] Collected:  12/08/19 1039    Specimen:  Blood Updated:  12/08/19 1110    Narrative:       The following orders were created for panel order CBC & Differential.  Procedure                               Abnormality         Status                     ---------                               -----------         ------                     CBC Auto Differential[[id number]]        Abnormal            Final result                 Please view results for these tests on the individual orders.    CBC Auto  Differential [[id number]]  (Abnormal) Collected:  12/08/19 1039    Specimen:  Blood Updated:  12/08/19 1110     WBC 2.30 10*3/mm3      Comment: Result checked         RBC 2.92 10*6/mm3      Hemoglobin 10.4 g/dL      Hematocrit 31.0 %      .3 fL      MCH 35.5 pg      MCHC 33.4 g/dL      RDW 16.7 %      RDW-SD 62.6 fl      MPV 8.8 fL      Platelets 59 10*3/mm3      Neutrophil % 69.6 %      Lymphocyte % 20.7 %      Monocyte % 7.4 %      Eosinophil % 2.0 %      Basophil % 0.3 %      Neutrophils, Absolute 1.60 10*3/mm3      Lymphocytes, Absolute 0.50 10*3/mm3      Monocytes, Absolute 0.20 10*3/mm3      Eosinophils, Absolute 0.00 10*3/mm3      Basophils, Absolute 0.00 10*3/mm3      nRBC 0.2 /100 WBC     Protime-INR [[id number]]  (Abnormal) Collected:  12/08/19 1039    Specimen:  Blood Updated:  12/08/19 1057     Protime 28.7 Seconds      INR 3.05    POC Lactate [[id number]]  (Normal) Collected:  12/08/19 1042    Specimen:  Blood Updated:  12/08/19 1043     Lactate 0.5 mmol/L      Comment: Serial Number: [id number]:  [id number]              Pending Results: blood cultures    Imaging Reviewed:   Xr Chest 2 View    Result Date: 12/8/2019   1. No active cardiopulmonary disease 2. Left-sided [id number] remains in good position. 3. No significant change from 08/30/2019  Electronically Signed By-Parminder Elmore Jr. On:12/8/2019 11:24 AM This report was finalized on [id number] by  Parminder Elmore Jr., .      I have reviewed the patient's labs, imaging, reports, and other clinician documentation.        Assessment/Plan   ASSESSMENT  1. Recurrent metastatic ovarian cancer-status post cycle 11 of Doxil/carboplatin with Neulasta support.  ANC 1.6.  Recent CTs showed a new small left lower lobe lung nodule.  2. Pancytopenia-ANC 1.6, recent Neulasta given.  Hemoglobin and platelet count stable.  Retacrit due for chemo-induced anemia on 12/13.  3. Fever with leukopenia- afebrile since admission.  RVP, chest x-ray and urinalysis  are negative for infection.  Lactate normal.  On vancomycin and cefepime.  Blood cultures negative to date.  4. Left upper extremity DVT/coagulopathy-INR 4.26-Coumadin discontinued until INR less than 2.5.  5. Hypomagnesemia-due to chemotherapy.  On weekly replacement.  Increase magnesium oxide to twice daily.    PLAN  1. Discontinue Coumadin  2. Continue weekly Retacrit to 12/13.  3. Increase magnesium oxide to twice daily  4. Continue chemotherapy as outpatient.  5. Continue antibiotics and await final blood cultures.  6. Daily CBC and INR.    Note initiated by IRENA Krishnan.  Pt seen and examined by Dr. Ball.   Thank you for this consultation.  We will be glad to follow her along with you.   Electronically signed by IRENA Hoffman, 12/09/19, 12:05 PM.    I have personally performed a face-to-face diagnostic evaluation on this patient.  I have reviewed and agreed with the care plan.    I discussed the patients findings and my recommendations with patient.    Thank you for this consult.  We will be happy to follow along in the care of this patient.     MER Ball MD  12/09/19  11:20 AM      Electronically signed by Mira Jeffrey APRN at 12/09/19 9786

## 2019-12-10 NOTE — PLAN OF CARE
Problem: Infection, Risk/Actual (Adult)  Goal: Infection Prevention/Resolution  Outcome: Ongoing (interventions implemented as appropriate)     Problem: Skin Injury Risk (Adult)  Goal: Identify Related Risk Factors and Signs and Symptoms  Outcome: Ongoing (interventions implemented as appropriate)     Problem: Patient Care Overview  Goal: Interprofessional Rounds/Family Conf  Outcome: Ongoing (interventions implemented as appropriate)     Problem: Skin Injury Risk (Adult)  Goal: Skin Health and Integrity  Outcome: Ongoing (interventions implemented as appropriate)   Continue with current plan of care , will continue to monitor

## 2019-12-10 NOTE — PROGRESS NOTES
Hematology/Oncology Inpatient Progress Note    PATIENT NAME: Tahira Zimmerman  : 1955  MRN: [id number]    CHIEF COMPLAINT: cytopenias and ovarian cancer    HISTORY OF PRESENT ILLNESS:  64 y.o. female who was admitted through the ER on 2019 complaining of fever and weakness for a few days.  She had a nonproductive cough.  Her temperature had been as high as 102.  On admission, she was found to be pancytopenic but did not require blood transfusions.  She was cultured and chest x-ray was negative.  She was started on vancomycin and cefepime.  ANC was 1.6.  INR 3.05-on Coumadin as outpatient for a left upper extremity DVT.     19  Hematology/Oncology was consulted as she is an established patient who we follow for her recurrent ovarian cancer.  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  She developed recurrent disease, intra-abdominally, 6 months later, requiring intra-abdominal peritoneal chemotherapy.  She was disease-free until  when she underwent exploratory laparotomy, omentectomy, bowel resection, liver biopsy and appendectomy at [facility].  Metastatic serous carcinoma was found to involve the colon, omentum, rectum and vaginal mass with positive margins.  Liver biopsy was negative.  She completed 7 cycles of chemotherapy in  and was in remission on CT scan.  She recurred in  and was started on chemotherapy followed by disease progression.  Since then, she has been on multiple lines of therapy with very short breaks between treatments.  Status post cycle 11 chemotherapy with Doxil/carboplatin plus Neulasta support on 2019.  -DVT of the left upper extremity-diagnosed in 2013.  Treated with long-term Coumadin.  -Pernicious anemia diagnosed 2016 and MELANI poor p.o. iron absorption diagnosed in 2018 with stool  heme negative.  Intrinsic factor antibody was positive and parietal cell antibody was negative.  She is on long-term B12 injections monthly.  Day 8 Injectafer given on 9/6/2019 and started on retacrit 40,000 units weekly on 10/25/2019-last given 12/6.  Office visit 12/6/2019 with no complaints.  WBC 4.02, hemoglobin 9.3, .2 and platelets 61K.  CT scans October 29, 2019 showed a new 2 mm nodule in the left lower lobe and a stable nodule in the left lower lobe.  She had stable soft tissue density and calcified nodules in the ventral abdominal wall and left retroperitoneal fat consistent with nonviable metastatic disease.  Weekly magnesium replacement continued.  INR was 2.0.  She was continued on monthly B12 injections. Retacrit and chemotherapy was continued.     PCP: Noemy Queen MD    Subjective She feels better today.  More energy.  Eating better    ROS:  Review of Systems   Constitutional: Negative for chills and fever.   HENT: Negative for ear pain, mouth sores, nosebleeds and sore throat.    Eyes: Negative for photophobia and visual disturbance.   Respiratory: Negative for wheezing and stridor.    Cardiovascular: Negative for chest pain and palpitations.   Gastrointestinal: Negative for abdominal pain, diarrhea, nausea and vomiting.   Endocrine: Negative for cold intolerance and heat intolerance.   Genitourinary: Negative for dysuria and hematuria.   Musculoskeletal: Negative for joint swelling and neck stiffness.   Skin: Negative for color change and rash.   Neurological: Negative for seizures and syncope.   Hematological: Negative for adenopathy.        No obvious bleeding   Psychiatric/Behavioral: Negative for agitation, confusion and hallucinations.        MEDICATIONS:    Scheduled Meds:    !Vancomycin Level Draw Needed  Does not apply Once   atorvastatin 20 mg Oral Nightly   cefepime 2 g Intravenous Q8H   [START ON 12/22/2019] cyanocobalamin 1,000 mcg Intramuscular Q28 Days   famotidine 40 mg  "Oral Daily   lactobacillus acidophilus 1 capsule Oral Daily   magnesium oxide 400 mg Oral BID   magnesium sulfate 3 g Intravenous Once   potassium chloride 40 mEq Oral QAM   potassium chloride 40 mEq Oral Nightly   pregabalin 100 mg Oral TID   sertraline 50 mg Oral Q PM   sodium chloride 10 mL Intravenous Q12H   triamterene-hydrochlorothiazide 1 tablet Oral Daily   vancomycin 15 mg/kg (Adjusted) Intravenous Q12H      Continuous Infusions:    Pharmacy to dose vancomycin    Pharmacy to dose warfarin       PRN Meds:  acetaminophen  •  ondansetron ODT  •  oxyCODONE  •  Pharmacy to dose vancomycin  •  Pharmacy to dose warfarin  •  promethazine  •  sodium chloride  •  temazepam     ALLERGIES:    Allergies   Allergen Reactions   • Morphine Nausea Only and Hives   • Penicillin V Potassium Rash   • Sulfa Antibiotics Rash   • Levaquin [Levofloxacin] Nausea Only and Dizziness     When taken with Coumadin   • Amoxicillin Rash   • Norco [Hydrocodone-Acetaminophen] Rash       Objective    VITALS:   /72 (BP Location: Right arm, Patient Position: Lying)   Pulse 65   Temp 98.5 °F (36.9 °C) (Oral)   Resp 18   Ht 165.1 cm (65\")   Wt 81.4 kg (179 lb 7.3 oz)   SpO2 99%   BMI 29.86 kg/m²     PHYSICAL EXAM:  Physical Exam   Constitutional: She is oriented to person, place, and time. No distress.   HENT:   Head: Normocephalic and atraumatic.   Eyes: Conjunctivae and EOM are normal. Right eye exhibits no discharge. Left eye exhibits no discharge. No scleral icterus.   Neck: Normal range of motion. Neck supple. No thyromegaly present.   Cardiovascular: Normal rate, regular rhythm and normal heart sounds. Exam reveals no gallop and no friction rub.   Pulmonary/Chest: Effort normal. No stridor. No respiratory distress. She has no wheezes.   Abdominal: Soft. Bowel sounds are normal. She exhibits no mass. There is no tenderness. There is no rebound and no guarding.   Musculoskeletal: Normal range of motion. She exhibits no " tenderness.   Lymphadenopathy:     She has no cervical adenopathy.   Neurological: She is alert and oriented to person, place, and time. She exhibits normal muscle tone.   Skin: Skin is warm. No rash noted. She is not diaphoretic. No erythema.   Psychiatric: She has a normal mood and affect. Her behavior is normal.   Nursing note and vitals reviewed.        RECENT LABS:  Lab Results (last 24 hours)     Procedure Component Value Units Date/Time    Blood Culture - Blood, Arm, Right [[id number]] Collected:  12/08/19 1031    Specimen:  Blood from Arm, Right Updated:  12/10/19 1053     Blood Culture No growth at 2 days    Blood Culture - Blood, Blood, PICC Line [[id number]] Collected:  12/08/19 1039    Specimen:  Blood, PICC Line Updated:  12/10/19 1053     Blood Culture No growth at 2 days    Magnesium [[id number]]  (Abnormal) Collected:  12/10/19 0759    Specimen:  Blood Updated:  12/10/19 0954     Magnesium 1.1 mg/dL     Vancomycin, Peak [[id number]]  (Normal) Collected:  12/10/19 0759    Specimen:  Blood Updated:  12/10/19 0920     Vancomycin Peak 39.00 mcg/mL     CBC & Differential [[id number]] Collected:  12/10/19 0418    Specimen:  Blood Updated:  12/10/19 0540    Narrative:       The following orders were created for panel order CBC & Differential.  Procedure                               Abnormality         Status                     ---------                               -----------         ------                     CBC Auto Differential[[id number]]        Abnormal            Final result                 Please view results for these tests on the individual orders.    CBC Auto Differential [[id number]]  (Abnormal) Collected:  12/10/19 0418    Specimen:  Blood Updated:  12/10/19 0540     WBC 2.20 10*3/mm3      RBC 2.80 10*6/mm3      Hemoglobin 10.0 g/dL      Hematocrit 29.5 %      .4 fL      MCH 35.8 pg      MCHC 34.0 g/dL      RDW 16.9 %      RDW-SD 63.0 fl      MPV 8.9 fL      Platelets 63 10*3/mm3       Neutrophil % 49.2 %      Lymphocyte % 29.7 %      Monocyte % 14.3 %      Eosinophil % 6.5 %      Basophil % 0.3 %      Neutrophils, Absolute 1.10 10*3/mm3      Lymphocytes, Absolute 0.70 10*3/mm3      Monocytes, Absolute 0.30 10*3/mm3      Eosinophils, Absolute 0.10 10*3/mm3      Basophils, Absolute 0.00 10*3/mm3      nRBC 0.3 /100 WBC     Protime-INR [[id number]]  (Abnormal) Collected:  12/10/19 0418    Specimen:  Blood Updated:  12/10/19 0520     Protime 36.9 Seconds      INR 3.93          PENDING RESULTS: blood cultures    IMAGING REVIEWED:  No radiology results for the last day    I have reviewed the patient's labs, imaging, reports, and other clinician documentation.    Assessment/Plan   ASSESSMENT:  1. Recurrent metastatic ovarian cancer-s/p cycle 11 of Doxil/carboplatin with Neulasta support.  ANC 1.1-lower.  Recent CTs showed a new small left lower lobe lung nodule.  2. Pancytopenia-ANC 1.1, recent Neulasta given.  Hemoglobin and platelet count improved. Retacrit due for chemo-induced anemia on 12/13.  3. Fever with leukopenia- afebrile since admission.  RVP, chest x-ray and urinalysis are negative for infection.  Lactate normal.  On vancomycin and cefepime.  Blood cultures negative to date.  4. Left upper extremity DVT/coagulopathy-INR 3.93.  Coumadin discontinued until INR less than 2.5.  5. Hypomagnesemia-due to chemotherapy.  On weekly replacement.  Increased magnesium oxide to twice daily.  Mag 1.1.      PLAN  1. Resume Coumadin when INR < 2.5.  2. Continue weekly Retacrit, due 12/13.  3. Continue magnesium oxide BID.   4. Continue chemotherapy as outpatient.  5. Continue antibiotics and await final blood cultures.  Could consider d/c vancomycin and continue cefepime.   6. Daily CBC and INR.  7. Magnesium sulfate 3 g IV today.      Note updated by IRENA Krishnan.  Pt. Seen and examined by Dr. Ball.   Electronically signed by IRENA Hoffman, 12/10/19, 1:53 PM.        I have personally performed a  face-to-face diagnostic evaluation on this patient.  I have reviewed and agree with the care plan.  As reviewed and edited.  Face-to-face evaluation completed.  Discussed with nurse practitioner.  So far her cultures are negative.  Overall she has improved.  Continue empiric antibiotics.    I discussed the patients findings and my recommendations with patient    I. MD Quinn  12/10/19  1:50 PM

## 2019-12-10 NOTE — PLAN OF CARE
Problem: Patient Care Overview  Goal: Plan of Care Review  Outcome: Ongoing (interventions implemented as appropriate)  Flowsheets (Taken 12/10/2019 0314)  Progress: improving  Plan of Care Reviewed With: patient  Outcome Summary: pt remained fever free throughout the night, no complaints, will continue to montior pt

## 2019-12-10 NOTE — PLAN OF CARE
Problem: Infection, Risk/Actual (Adult)  Goal: Infection Prevention/Resolution  12/10/2019 1046 by Alexandria Mcelroy RN  Outcome: Ongoing (interventions implemented as appropriate)  12/10/2019 1043 by Alexandria Mcelroy RN  Outcome: Ongoing (interventions implemented as appropriate)     Problem: Patient Care Overview  Goal: Interprofessional Rounds/Family Conf  12/10/2019 1046 by Alexandria Mcelroy RN  Outcome: Ongoing (interventions implemented as appropriate)  12/10/2019 1043 by Alexandria Mcelroy RN  Outcome: Ongoing (interventions implemented as appropriate)   Continue with current plan of care, patient is agreeable , will continue to monitor .

## 2019-12-11 VITALS
OXYGEN SATURATION: 97 % | TEMPERATURE: 98 F | WEIGHT: 180.78 LBS | SYSTOLIC BLOOD PRESSURE: 117 MMHG | RESPIRATION RATE: 16 BRPM | BODY MASS INDEX: 30.12 KG/M2 | HEIGHT: 65 IN | HEART RATE: 46 BPM | DIASTOLIC BLOOD PRESSURE: 73 MMHG

## 2019-12-11 PROBLEM — R50.81 NEUTROPENIC FEVER (HCC): Status: RESOLVED | Noted: 2019-12-08 | Resolved: 2019-12-11

## 2019-12-11 PROBLEM — D70.9 NEUTROPENIC FEVER (HCC): Status: RESOLVED | Noted: 2019-12-08 | Resolved: 2019-12-11

## 2019-12-11 LAB
BASOPHILS # BLD AUTO: 0 10*3/MM3 (ref 0–0.2)
BASOPHILS NFR BLD AUTO: 0.4 % (ref 0–1.5)
DEPRECATED RDW RBC AUTO: 64.3 FL (ref 37–54)
EOSINOPHIL # BLD AUTO: 0.1 10*3/MM3 (ref 0–0.4)
EOSINOPHIL NFR BLD AUTO: 5.9 % (ref 0.3–6.2)
ERYTHROCYTE [DISTWIDTH] IN BLOOD BY AUTOMATED COUNT: 17.3 % (ref 12.3–15.4)
HCT VFR BLD AUTO: 31.1 % (ref 34–46.6)
HGB BLD-MCNC: 10.3 G/DL (ref 12–15.9)
INR PPP: 1.85 (ref 2–3)
LYMPHOCYTES # BLD AUTO: 0.7 10*3/MM3 (ref 0.7–3.1)
LYMPHOCYTES NFR BLD AUTO: 28.4 % (ref 19.6–45.3)
MAGNESIUM SERPL-MCNC: 1.6 MG/DL (ref 1.6–2.4)
MCH RBC QN AUTO: 35.2 PG (ref 26.6–33)
MCHC RBC AUTO-ENTMCNC: 33.1 G/DL (ref 31.5–35.7)
MCV RBC AUTO: 106.3 FL (ref 79–97)
MONOCYTES # BLD AUTO: 0.5 10*3/MM3 (ref 0.1–0.9)
MONOCYTES NFR BLD AUTO: 19 % (ref 5–12)
NEUTROPHILS # BLD AUTO: 1.1 10*3/MM3 (ref 1.7–7)
NEUTROPHILS NFR BLD AUTO: 46.3 % (ref 42.7–76)
NRBC BLD AUTO-RTO: 0.3 /100 WBC (ref 0–0.2)
PLATELET # BLD AUTO: 73 10*3/MM3 (ref 140–450)
PMV BLD AUTO: 9.2 FL (ref 6–12)
PROTHROMBIN TIME: 17.9 SECONDS (ref 19.4–28.5)
RBC # BLD AUTO: 2.92 10*6/MM3 (ref 3.77–5.28)
WBC NRBC COR # BLD: 2.5 10*3/MM3 (ref 3.4–10.8)

## 2019-12-11 PROCEDURE — 83735 ASSAY OF MAGNESIUM: CPT | Performed by: HOSPITALIST

## 2019-12-11 PROCEDURE — 25010000002 CEFEPIME PER 500 MG: Performed by: INTERNAL MEDICINE

## 2019-12-11 PROCEDURE — 99233 SBSQ HOSP IP/OBS HIGH 50: CPT | Performed by: INTERNAL MEDICINE

## 2019-12-11 PROCEDURE — 85610 PROTHROMBIN TIME: CPT | Performed by: INTERNAL MEDICINE

## 2019-12-11 PROCEDURE — 99239 HOSP IP/OBS DSCHRG MGMT >30: CPT | Performed by: HOSPITALIST

## 2019-12-11 PROCEDURE — 85025 COMPLETE CBC W/AUTO DIFF WBC: CPT | Performed by: NURSE PRACTITIONER

## 2019-12-11 RX ORDER — POTASSIUM CHLORIDE 20 MEQ/1
40 TABLET, EXTENDED RELEASE ORAL EVERY MORNING
Qty: 30 TABLET | Refills: 0 | Status: SHIPPED | OUTPATIENT
Start: 2019-12-11 | End: 2020-06-11

## 2019-12-11 RX ORDER — WARFARIN SODIUM 4 MG/1
TABLET ORAL
Qty: 7 TABLET | Refills: 0 | Status: SHIPPED | OUTPATIENT
Start: 2019-12-11 | End: 2019-12-11

## 2019-12-11 RX ORDER — CEFDINIR 300 MG/1
300 CAPSULE ORAL 2 TIMES DAILY
Qty: 14 CAPSULE | Refills: 0 | Status: SHIPPED | OUTPATIENT
Start: 2019-12-11 | End: 2019-12-30

## 2019-12-11 RX ORDER — WARFARIN SODIUM 7.5 MG/1
7.5 TABLET ORAL
Status: DISCONTINUED | OUTPATIENT
Start: 2019-12-13 | End: 2019-12-11

## 2019-12-11 RX ORDER — TEMAZEPAM 30 MG/1
30 CAPSULE ORAL NIGHTLY PRN
Qty: 30 CAPSULE | Refills: 1 | Status: SHIPPED | OUTPATIENT
Start: 2019-12-11 | End: 2019-12-30

## 2019-12-11 RX ORDER — WARFARIN SODIUM 5 MG/1
5 TABLET ORAL
Status: DISCONTINUED | OUTPATIENT
Start: 2019-12-11 | End: 2019-12-11

## 2019-12-11 RX ORDER — WARFARIN SODIUM 5 MG/1
TABLET ORAL
Qty: 30 TABLET | Refills: 0 | Status: SHIPPED | OUTPATIENT
Start: 2019-12-11 | End: 2020-08-06 | Stop reason: SDUPTHER

## 2019-12-11 RX ORDER — WARFARIN SODIUM 5 MG/1
5 TABLET ORAL
Status: DISCONTINUED | OUTPATIENT
Start: 2019-12-11 | End: 2019-12-11 | Stop reason: HOSPADM

## 2019-12-11 RX ORDER — WARFARIN SODIUM 4 MG/1
4 TABLET ORAL
Status: DISCONTINUED | OUTPATIENT
Start: 2019-12-11 | End: 2019-12-11

## 2019-12-11 RX ADMIN — FAMOTIDINE 40 MG: 20 TABLET ORAL at 09:49

## 2019-12-11 RX ADMIN — CEFEPIME HYDROCHLORIDE 2 G: 2 INJECTION, POWDER, FOR SOLUTION INTRAVENOUS at 04:34

## 2019-12-11 RX ADMIN — Medication 10 ML: at 09:49

## 2019-12-11 RX ADMIN — PREGABALIN 100 MG: 100 CAPSULE ORAL at 09:48

## 2019-12-11 RX ADMIN — ACETAMINOPHEN 1000 MG: 500 TABLET, FILM COATED ORAL at 09:46

## 2019-12-11 RX ADMIN — MAGNESIUM OXIDE TAB 400 MG (241.3 MG ELEMENTAL MG) 400 MG: 400 (241.3 MG) TAB at 09:48

## 2019-12-11 RX ADMIN — TRIAMTERENE AND HYDROCHLOROTHIAZIDE 1 TABLET: 37.5; 25 TABLET ORAL at 09:48

## 2019-12-11 RX ADMIN — Medication 1 CAPSULE: at 09:48

## 2019-12-11 RX ADMIN — POTASSIUM CHLORIDE 40 MEQ: 1500 TABLET, EXTENDED RELEASE ORAL at 05:37

## 2019-12-11 NOTE — PLAN OF CARE
Problem: Patient Care Overview  Goal: Plan of Care Review  Outcome: Ongoing (interventions implemented as appropriate)  Flowsheets (Taken 12/11/2019 0402)  Progress: improving  Plan of Care Reviewed With: patient  Outcome Summary: pt remained fever free through the night, if pt's blood cultures remain negative, pt should d/c home today with PO antibiotics, will continue to monitor pt

## 2019-12-11 NOTE — PROGRESS NOTES
[facility] Medicine Services Daily Progress Note      Hospitalist Team  LOS 2 days      Patient Care Team:  Noemy Queen MD as PCP - General (Family Medicine)  Daryn Tay MD as Consulting Physician (Hematology and Oncology)    Patient Location: 221/1      Subjective   Subjective     Chief Complaint / Subjective  Chief Complaint   Patient presents with   • Fever       Present on Admission:  • Neutropenic fever (CMS/HCC)  • Malignant neoplasm of right ovary (CMS/HCC)  • Left upper extremity deep vein thrombosis (CMS/HCC)  • Hypomagnesemia  • Essential hypertension  • Hyperlipidemia      Brief Synopsis of Hospital Course/HPI    Chief Complaint: fever    HPI:   Tahira Zimmerman is a 64 y.o. female patient who has been taking chemotherapy since 2016 after discovering recurrent ovarian cancer.  Initially she had the disease in 1996. She has been cancer free almost 20 years. So far she has got 14 cycles of chemotherapy. The last one was 2 weeks ago. She came to ER due to fever and chills that started couple of days ago. She has some cough but no phlegm. CXR is clean. No urinary Symptoms. No diarrhea.  WBC is 2.3.   She doesn'tappear to be septic. Vanco/Cefepime were started in ER and continued upon admission until blood cultures are final (no growth so far and pending).  Respiratory virus panel was negative.    Review of systems:  12 point review of systems was reviewed and was negative except as above.          Date: 12/9/2019: The patient reports feeling much better.  She has no further shortness of breath and her cough is significantly improved.  She is no longer congested.  No GI or  complaints.  12/10/2019: The patient reports feeling much better.  She has had no fever and denies any complaints of congestion, sore throat, cough, chest pain, GI or  complaints.        ROS  12 point review of systems was reviewed and was negative except as above.    Objective   Objective   "    Vital Signs  Temp:  [97.6 °F (36.4 °C)-98.5 °F (36.9 °C)] 98.5 °F (36.9 °C)  Heart Rate:  [58-65] 65  Resp:  [15-18] 18  BP: (107-130)/(63-76) 112/72  Oxygen Therapy  SpO2: 99 %  Pulse Oximetry Type: Intermittent  Device (Oxygen Therapy): room air  Flowsheet Rows      First Filed Value   Admission Height  165.1 cm (65\") Documented at 12/08/2019 0949   Admission Weight  82.8 kg (182 lb 8.7 oz) Documented at 12/08/2019 0949        Intake & Output (last 3 days)       12/08 0701 - 12/09 0700 12/09 0701 - 12/10 0700 12/10 0701 - 12/11 0700    P.O.  1280 720    IV Piggyback  450     Total Intake(mL/kg)  1730 (21.3) 720 (8.8)    Urine (mL/kg/hr)   0 (0)    Stool   1    Total Output   1    Net  +1730 +719           Urine Unmeasured Occurrence   2 x        Lines, Drains & Airways    Active LDAs     Name:   Placement date:   Placement time:   Site:   Days:    Single Lumen Implantable Port 12/08/19 Left Chest   12/08/19    1140    Chest   1                  Physical Exam:  Exam unchanged from 12/9/2019:  Well-developed well-nourished female comfortable on room air in no acute distress sitting up in bed awake and alert; mucous membranes moist; lungs clear to auscultation bilaterally; CV regular rate and rhythm; abdomen soft nontender nondistended with active bowel sounds; extremities with no edema, cyanosis or calf tenderness; palpable pedal pulses bilaterally; neurologic exam grossly nonfocal; no [place] catheter.      Procedures:              Results Review:     I reviewed the patient's new clinical results.      Lab Results (last 24 hours)     Procedure Component Value Units Date/Time    Blood Culture - Blood, Arm, Right [[id number]] Collected:  12/08/19 1031    Specimen:  Blood from Arm, Right Updated:  12/10/19 1053     Blood Culture No growth at 2 days    Blood Culture - Blood, Blood, PICC Line [[id number]] Collected:  12/08/19 1039    Specimen:  Blood, PICC Line Updated:  12/10/19 1053     Blood Culture No growth at 2 " days    Magnesium [[id number]]  (Abnormal) Collected:  12/10/19 0759    Specimen:  Blood Updated:  12/10/19 0954     Magnesium 1.1 mg/dL     Vancomycin, Peak [[id number]]  (Normal) Collected:  12/10/19 0759    Specimen:  Blood Updated:  12/10/19 0920     Vancomycin Peak 39.00 mcg/mL     CBC & Differential [[id number]] Collected:  12/10/19 0418    Specimen:  Blood Updated:  12/10/19 0540    Narrative:       The following orders were created for panel order CBC & Differential.  Procedure                               Abnormality         Status                     ---------                               -----------         ------                     CBC Auto Differential[[id number]]        Abnormal            Final result                 Please view results for these tests on the individual orders.    CBC Auto Differential [[id number]]  (Abnormal) Collected:  12/10/19 0418    Specimen:  Blood Updated:  12/10/19 0540     WBC 2.20 10*3/mm3      RBC 2.80 10*6/mm3      Hemoglobin 10.0 g/dL      Hematocrit 29.5 %      .4 fL      MCH 35.8 pg      MCHC 34.0 g/dL      RDW 16.9 %      RDW-SD 63.0 fl      MPV 8.9 fL      Platelets 63 10*3/mm3      Neutrophil % 49.2 %      Lymphocyte % 29.7 %      Monocyte % 14.3 %      Eosinophil % 6.5 %      Basophil % 0.3 %      Neutrophils, Absolute 1.10 10*3/mm3      Lymphocytes, Absolute 0.70 10*3/mm3      Monocytes, Absolute 0.30 10*3/mm3      Eosinophils, Absolute 0.10 10*3/mm3      Basophils, Absolute 0.00 10*3/mm3      nRBC 0.3 /100 WBC     Protime-INR [[id number]]  (Abnormal) Collected:  12/10/19 0418    Specimen:  Blood Updated:  12/10/19 0520     Protime 36.9 Seconds      INR 3.93        No results found for: HGBA1C  Results from last 7 days   Lab Units 12/10/19  0418 12/09/19  0402 12/08/19  1646   INR  3.93* 4.26* 3.19*               Microbiology Results (last 10 days)     Procedure Component Value - Date/Time    Blood Culture - Blood, Blood, PICC Line [[id number]] Collected:   12/08/19 1039    Lab Status:  Preliminary result Specimen:  Blood, PICC Line Updated:  12/10/19 1053     Blood Culture No growth at 2 days    Respiratory Panel, PCR - Swab, Nasopharynx [[id number]]  (Normal) Collected:  12/08/19 1037    Lab Status:  Final result Specimen:  Swab from Nasopharynx Updated:  12/08/19 1156     ADENOVIRUS, PCR Not Detected     Coronavirus 229E Not Detected     Coronavirus HKU1 Not Detected     Coronavirus NL63 Not Detected     Coronavirus OC43 Not Detected     Human Metapneumovirus Not Detected     Human Rhinovirus/Enterovirus Not Detected     Influenza B PCR Not Detected     Parainfluenza Virus 1 Not Detected     Parainfluenza Virus 2 Not Detected     Parainfluenza Virus 3 Not Detected     Parainfluenza Virus 4 Not Detected     Bordetella pertussis pcr Not Detected     Influenza A H1 2009 PCR Not Detected     Chlamydophila pneumoniae PCR Not Detected     Mycoplasma pneumo by PCR Not Detected     Influenza A PCR Not Detected     Influenza A H3 Not Detected     Influenza A H1 Not Detected     RSV, PCR Not Detected    Blood Culture - Blood, Arm, Right [[id number]] Collected:  12/08/19 1031    Lab Status:  Preliminary result Specimen:  Blood from Arm, Right Updated:  12/10/19 1053     Blood Culture No growth at 2 days          ECG/EMG Results (most recent)     None               Results for orders placed during the hospital encounter of 10/01/19   Adult Transthoracic Echo Complete W/ Cont if Necessary Per Protocol    Narrative · Left atrial cavity size is borderline dilated.       Normal LV size and contractility EF of 60-65%  Normal RV size  Borderline left atrial enlargement  Aortic valve, mitral valve, tricuspid valve appears structurally normal,   no significant regurgitation seen.  No pericardial effusion seen.  Proximal aorta appears normal in size.         Xr Chest 2 View    Result Date: 12/8/2019   1. No active cardiopulmonary disease 2. Left-sided Yybhjg-[id number]-Iuem remains in good  position. 3. No significant change from 08/30/2019  Electronically Signed By-Parminder Elmore Jr. On:12/8/2019 11:24 AM This report was finalized on [id number] by  Parminder Elmore Jr., .      Xrays, labs reviewed personally by physician.    Medication Review:   I have reviewed the patient's current medication list      Scheduled Meds    atorvastatin 20 mg Oral Nightly   cefepime 2 g Intravenous Q8H   [START ON 12/22/2019] cyanocobalamin 1,000 mcg Intramuscular Q28 Days   famotidine 40 mg Oral Daily   lactobacillus acidophilus 1 capsule Oral Daily   magnesium oxide 400 mg Oral BID   potassium chloride 40 mEq Oral QAM   potassium chloride 40 mEq Oral Nightly   pregabalin 100 mg Oral TID   sertraline 50 mg Oral Q PM   sodium chloride 10 mL Intravenous Q12H   triamterene-hydrochlorothiazide 1 tablet Oral Daily       Meds Infusions    Pharmacy to dose warfarin        Meds PRN  •  acetaminophen  •  ondansetron ODT  •  oxyCODONE  •  Pharmacy to dose warfarin  •  promethazine  •  sodium chloride  •  temazepam        Assessment/Plan   Assessment/Plan     Active Hospital Problems    Diagnosis  POA   • **Neutropenic fever (CMS/HCC) [D70.9, R50.81]  Yes     Priority: High   • Malignant neoplasm of right ovary (CMS/HCC) [C56.1]  Yes     Priority: High   • Left upper extremity deep vein thrombosis (CMS/HCC) [I82.622]  Yes     Priority: Medium   • Hypomagnesemia [E83.42]  Yes     Priority: Medium   • Essential hypertension [I10]  Yes     Priority: Medium   • Hyperlipidemia [E78.5]  Yes     Priority: Medium      Resolved Hospital Problems   No resolved problems to display.         Active Hospital Problems:  * Neutropenic fever (CMS/HCC)- (present on admission)  Patient had fever and chills preadmission but no clear source of infection identified.  Respiratory virus panel negative.  Blood cultures x2 negative and pending.  Initially started on vancomycin and cefepime and the vancomycin will be discontinued at this time.    Malignant  neoplasm of right ovary (CMS/HCC)- (present on admission)   Ovarian cancer, initially diagnosed in 1996  Surgery/Chemotherapy dose at that time  Recurrent  Disease 4 years ago.  Chemo started - total 14 cycles till current date.  Good general condition.    Essential hypertension- (present on admission)  Chronic and controlled  -Continue Dyazide    Hyperlipidemia- (present on admission)   Continue atorvastatin    Left upper extremity deep vein thrombosis (CMS/HCC)- (present on admission)  Continue Coumadin therapy with goal INR 2-3  As well she has a Port on left chest - need chronic anticoagulation     Hypomagnesemia- (present on admission)  Acute on chronic  -Received IV replacement of 3 g magnesium sulfate 12/10/2019  -Continue oral replacement           Resolved Hospital Problems:  No notes have been filed under this hospital service.  Service: Hospitalist            VTE Prophylaxis - SCDs.      Code Status -   Code Status and Medical Interventions:   Ordered at: 12/08/19 1532     Level Of Support Discussed With:    Patient     Code Status:    CPR     Medical Interventions (Level of Support Prior to Arrest):    Full       Discharge Planning    Destination      Coordination has not been started for this encounter.      Durable Medical Equipment      Coordination has not been started for this encounter.      Dialysis/Infusion      Coordination has not been started for this encounter.      Home Medical Care      Coordination has not been started for this encounter.      Therapy      Coordination has not been started for this encounter.      Community Resources      Coordination has not been started for this encounter.            Electronically signed by Inez Gudino MD, 12/10/19, 8:31 PM.  [facility] Hospitalist Team

## 2019-12-11 NOTE — PROGRESS NOTES
Hematology/Oncology Inpatient Progress Note    PATIENT NAME: Tahira Zimmerman  : 1955  MRN: [id number]    CHIEF COMPLAINT: cytopenias and ovarian cancer    HISTORY OF PRESENT ILLNESS:  64 y.o. female who was admitted through the ER on 2019 complaining of fever and weakness for a few days.  She had a nonproductive cough.  Her temperature had been as high as 102.  On admission, she was found to be pancytopenic but did not require blood transfusions.  She was cultured and chest x-ray was negative.  She was started on vancomycin and cefepime.  ANC was 1.6.  INR 3.05-on Coumadin as outpatient for a left upper extremity DVT.     19  Hematology/Oncology was consulted as she is an established patient who we follow for her recurrent ovarian cancer.  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  She developed recurrent disease, intra-abdominally, 6 months later, requiring intra-abdominal peritoneal chemotherapy.  She was disease-free until  when she underwent exploratory laparotomy, omentectomy, bowel resection, liver biopsy and appendectomy at [facility].  Metastatic serous carcinoma was found to involve the colon, omentum, rectum and vaginal mass with positive margins.  Liver biopsy was negative.  She completed 7 cycles of chemotherapy in  and was in remission on CT scan.  She recurred in  and was started on chemotherapy followed by disease progression.  Since then, she has been on multiple lines of therapy with very short breaks between treatments.  Status post cycle 11 chemotherapy with Doxil/carboplatin plus Neulasta support on 2019.  -DVT of the left upper extremity-diagnosed in 2013.  Treated with long-term Coumadin.  -Pernicious anemia diagnosed 2016 and MELANI poor p.o. iron absorption diagnosed in 2018 with stool  heme negative.  Intrinsic factor antibody was positive and parietal cell antibody was negative.  She is on long-term B12 injections monthly.  Day 8 Injectafer given on 9/6/2019 and started on retacrit 40,000 units weekly on 10/25/2019-last given 12/6.  Office visit 12/6/2019 with no complaints.  WBC 4.02, hemoglobin 9.3, .2 and platelets 61K.  CT scans October 29, 2019 showed a new 2 mm nodule in the left lower lobe and a stable nodule in the left lower lobe.  She had stable soft tissue density and calcified nodules in the ventral abdominal wall and left retroperitoneal fat consistent with nonviable metastatic disease.  Weekly magnesium replacement continued.  INR was 2.0.  She was continued on monthly B12 injections. Retacrit and chemotherapy was continued.     PCP: Noemy Queen MD    Subjective She feels better today.  More energy.  Eating better.  Likely to be discharged today    ROS:  Review of Systems   Constitutional: Negative for chills and fever.   HENT: Negative for ear pain, mouth sores, nosebleeds and sore throat.    Eyes: Negative for photophobia and visual disturbance.   Respiratory: Negative for wheezing and stridor.    Cardiovascular: Negative for chest pain and palpitations.   Gastrointestinal: Negative for abdominal pain, diarrhea, nausea and vomiting.   Endocrine: Negative for cold intolerance and heat intolerance.   Genitourinary: Negative for dysuria and hematuria.   Musculoskeletal: Negative for joint swelling and neck stiffness.   Skin: Negative for color change and rash.   Neurological: Negative for seizures and syncope.   Hematological: Negative for adenopathy.        No obvious bleeding   Psychiatric/Behavioral: Negative for agitation, confusion and hallucinations.        MEDICATIONS:    Scheduled Meds:      atorvastatin 20 mg Oral Nightly   cefepime 2 g Intravenous Q8H   [START ON 12/22/2019] cyanocobalamin 1,000 mcg Intramuscular Q28 Days   famotidine 40 mg Oral Daily  "  lactobacillus acidophilus 1 capsule Oral Daily   magnesium oxide 400 mg Oral BID   potassium chloride 40 mEq Oral QAM   potassium chloride 40 mEq Oral Nightly   pregabalin 100 mg Oral TID   sertraline 50 mg Oral Q PM   sodium chloride 10 mL Intravenous Q12H   triamterene-hydrochlorothiazide 1 tablet Oral Daily   warfarin 5 mg Oral Once per day on Sun Mon Tue Wed Thu Sat   [START ON 12/13/2019] warfarin 7.5 mg Oral Once per day on Fri      Continuous Infusions:      Pharmacy to dose warfarin       PRN Meds:  •  acetaminophen  •  influenza vaccine  •  ondansetron ODT  •  oxyCODONE  •  Pharmacy to dose warfarin  •  promethazine  •  sodium chloride  •  temazepam     ALLERGIES:    Allergies   Allergen Reactions   • Morphine Nausea Only and Hives   • Penicillin V Potassium Rash   • Sulfa Antibiotics Rash   • Levaquin [Levofloxacin] Nausea Only and Dizziness     When taken with Coumadin   • Amoxicillin Rash   • Norco [Hydrocodone-Acetaminophen] Rash       Objective    VITALS:   /73 (BP Location: Right arm, Patient Position: Lying)   Pulse (!) 46   Temp 98 °F (36.7 °C) (Oral)   Resp 16   Ht 165.1 cm (65\")   Wt 82 kg (180 lb 12.4 oz)   SpO2 97%   BMI 30.08 kg/m²     PHYSICAL EXAM:  Physical Exam   Constitutional: She is oriented to person, place, and time. No distress.   HENT:   Head: Normocephalic and atraumatic.   Eyes: Conjunctivae and EOM are normal. Right eye exhibits no discharge. Left eye exhibits no discharge. No scleral icterus.   Neck: Normal range of motion. Neck supple. No thyromegaly present.   Cardiovascular: Normal rate, regular rhythm and normal heart sounds. Exam reveals no gallop and no friction rub.   Pulmonary/Chest: Effort normal. No stridor. No respiratory distress. She has no wheezes.   Abdominal: Soft. Bowel sounds are normal. She exhibits no mass. There is no tenderness. There is no rebound and no guarding.   Musculoskeletal: Normal range of motion. She exhibits no tenderness. "   Lymphadenopathy:     She has no cervical adenopathy.   Neurological: She is alert and oriented to person, place, and time. She exhibits normal muscle tone.   Skin: Skin is warm. No rash noted. She is not diaphoretic. No erythema.   Psychiatric: She has a normal mood and affect. Her behavior is normal.   Nursing note and vitals reviewed.        RECENT LABS:  Lab Results (last 24 hours)     Procedure Component Value Units Date/Time    CBC & Differential [[id number]] Collected:  12/11/19 0538    Specimen:  Blood Updated:  12/11/19 0815    Narrative:       The following orders were created for panel order CBC & Differential.  Procedure                               Abnormality         Status                     ---------                               -----------         ------                     CBC Auto Differential[[id number]]        Abnormal            Final result                 Please view results for these tests on the individual orders.    CBC Auto Differential [[id number]]  (Abnormal) Collected:  12/11/19 0538    Specimen:  Blood Updated:  12/11/19 0815     WBC 2.50 10*3/mm3      RBC 2.92 10*6/mm3      Hemoglobin 10.3 g/dL      Hematocrit 31.1 %      .3 fL      MCH 35.2 pg      MCHC 33.1 g/dL      RDW 17.3 %      RDW-SD 64.3 fl      MPV 9.2 fL      Platelets 73 10*3/mm3      Neutrophil % 46.3 %      Lymphocyte % 28.4 %      Monocyte % 19.0 %      Eosinophil % 5.9 %      Basophil % 0.4 %      Neutrophils, Absolute 1.10 10*3/mm3      Lymphocytes, Absolute 0.70 10*3/mm3      Monocytes, Absolute 0.50 10*3/mm3      Eosinophils, Absolute 0.10 10*3/mm3      Basophils, Absolute 0.00 10*3/mm3      nRBC 0.3 /100 WBC     Narrative:       Appended report. These results have been appended to a previously verified report.    Protime-INR [[id number]]  (Abnormal) Collected:  12/11/19 0538    Specimen:  Blood Updated:  12/11/19 0615     Protime 17.9 Seconds      INR 1.85    Blood Culture - Blood, Arm, Right [[id number]]  Collected:  12/08/19 1031    Specimen:  Blood from Arm, Right Updated:  12/10/19 1053     Blood Culture No growth at 2 days    Blood Culture - Blood, Blood, PICC Line [[id number]] Collected:  12/08/19 1039    Specimen:  Blood, PICC Line Updated:  12/10/19 1053     Blood Culture No growth at 2 days          PENDING RESULTS: blood cultures final    IMAGING REVIEWED:  No radiology results for the last day    I have reviewed the patient's labs, imaging, reports, and other clinician documentation.    Assessment/Plan   ASSESSMENT:  1. Recurrent metastatic ovarian cancer-s/p cycle 11 of Doxil/carboplatin with Neulasta support.  ANC 1.1-lower.  Recent CTs showed a new small left lower lobe lung nodule.  2. Pancytopenia-ANC 1.1, recent Neulasta given.  Hemoglobin and platelet count improved. Retacrit due for chemo-induced anemia on 12/13.  3. Fever with leukopenia- afebrile since admission.  RVP, chest x-ray and urinalysis are negative for infection.  Lactate normal.  On vancomycin and cefepime.  Blood cultures negative to date.  4. Left upper extremity DVT/coagulopathy-INR 3.93.  Coumadin discontinued until INR less than 2.5.  5. Hypomagnesemia-due to chemotherapy.  On weekly replacement.  Increased magnesium oxide to twice daily.  Mag 1.1.      PLAN  1. Resume Coumadin with dose reduction 4mg by mouth daily.  2. Continue weekly Retacrit, due 12/13.  3. Continue magnesium oxide BID.   4. Continue chemotherapy as outpatient.  5. Continue antibiotics.   6. CBC and INR in our office 12/13/19-scheduled 815am.     Note updated by IRENA Wolfe.  Pt. Seen and examined by Dr. Ball.   Electronically signed by LEWIS Espinal, 12/11/19, 10:20 AM.          I have personally performed a face-to-face diagnostic evaluation on this patient.  I have reviewed and agree with the care plan.  As reviewed and edited.  Face-to-face evaluation completed.  Discussed with nurse practitioner.  So far her cultures are negative.  Overall she  has improved.  Continue empiric antibiotics.    I discussed the patients findings and my recommendations with patient    I. MD Quinn  12/11/19  10:22 AM

## 2019-12-11 NOTE — DISCHARGE SUMMARY
[facility] Medicine Services  DISCHARGE SUMMARY        Prepared For PCP:  Noemy Queen MD    Patient Name: Tahira Zimmerman  : 1955  MRN: [id number]      Date of Admission:   2019    Date of Discharge:  2019    Length of stay:  LOS: 3 days     Hospital Course     Presenting Problem:   Neutropenic fever (CMS/HCC) [D70.9, R50.81]      Active Hospital Problems    Diagnosis  POA   • Malignant neoplasm of right ovary (CMS/HCC) [C56.1]  Yes     Priority: High   • Left upper extremity deep vein thrombosis (CMS/HCC) [I82.622]  Yes     Priority: Medium   • Hypomagnesemia [E83.42]  Yes     Priority: Medium   • Essential hypertension [I10]  Yes     Priority: Medium   • Hyperlipidemia [E78.5]  Yes     Priority: Medium      Resolved Hospital Problems    Diagnosis Date Resolved POA   • **Neutropenic fever (CMS/HCC) [D70.9, R50.81] 2019 Yes     Priority: High         Hospital Course:  Tahira Zimmerman is a 64 y.o. female patient who presented to [facility] on 2019 due to fever and chills. She stated this started two days ago. She has some cough but no phlegm. CXR is clean. No urinary Symptoms. No diarrhea.  WBC is 2.3.   She doesn'tappear to be septic. Vanco/Cefepime were started in ER and continued upon admission until blood cultures are final (no growth so far and pending).  Respiratory virus panel was negative.  She has been taking chemotherapy since 2016 after discovering recurrent ovarian cancer.  Initially she had the disease in . She has been cancer free almost 20 years. So far she has got 14 cycles of chemotherapy. The last one was 2 weeks ago.    Patient was diagnosed with neutropenic fever. Hematology/oncology followed patient during hospitalization.  Patient's recent CT showed a new small left lower lobe lung nodule.  Blood cultures pending.  Respiratory panel was negative.  Patient also was found to have acute on chronic hypomagnesemia and which was  replaced.    Patient is now stable for discharge on 12/11/2019.  Patient will be discharged home on augmentin for a total of 10 days.  Patient to follow-up with cancer care center on Friday.  Cancer care center to follow and dose patient's Coumadin level. Patient to follow-up with PCP in 1 week.           Recommendation for Outpatient Providers:       Post monitoring of PT/INR secondary to antibiotic interactions with Coumadin.      Reasons For Change In Medications and Indications for New Medications:    Omnicef- Neutropenic fever, infection- final blood cultures still pending    Day of Discharge         Vital Signs:   Temp:  [98 °F (36.7 °C)-98.5 °F (36.9 °C)] 98 °F (36.7 °C)  Heart Rate:  [46-65] 46  Resp:  [16-18] 16  BP: (111-117)/(68-73) 117/73     Physical Exam:    Well-developed well-nourished in no acute distress sitting up in bed awake and alert; mucous membranes moist; sclerae anicteric; lungs clear to auscultation bilaterally; CV regular rate and rhythm; abdomen soft nontender nondistended; extremities with no edema, cyanosis or calf tenderness; palpable pedal pulses bilaterally; no [place] catheter.    Pertinent  and/or Most Recent Results     Results from last 7 days   Lab Units 12/11/19  0538 12/10/19  0418 12/09/19  0402 12/08/19  1039 12/06/19  1057   WBC 10*3/mm3 2.50* 2.20* 2.00* 2.30* 4.02   HEMOGLOBIN g/dL 10.3* 10.0* 9.4* 10.4* 9.3*   HEMATOCRIT % 31.1* 29.5* 28.1* 31.0* 28.6*   PLATELETS 10*3/mm3 73* 63* 54* 59* 61*   SODIUM mmol/L  --   --  142 137  --    POTASSIUM mmol/L  --   --  3.6 3.5  --    CHLORIDE mmol/L  --   --  107 102  --    CO2 mmol/L  --   --  26.0 26.0  --    BUN mg/dL  --   --  11 14  --    CREATININE mg/dL  --   --  0.70 0.84  --    GLUCOSE mg/dL  --   --  81 92  --    CALCIUM mg/dL  --   --  8.6 8.5*  --      Results from last 7 days   Lab Units 12/11/19  0538 12/10/19  0418 12/09/19  0402 12/08/19  1646 12/08/19  1039 12/06/19  0954   BILIRUBIN mg/dL  --   --   --   --  0.2   --    ALK PHOS U/L  --   --   --   --  119*  --    ALT (SGPT) U/L  --   --   --   --  7  --    AST (SGOT) U/L  --   --   --   --  17  --    PROTIME Seconds 17.9* 36.9* 40.0* 30.0* 28.7*  --    INR  1.85* 3.93* 4.26* 3.19* 3.05* 2.00           Invalid input(s): TG, LDLCALC, LDLREALC  Results from last 7 days   Lab Units 12/08/19  1042 12/08/19  1039   PROBNP pg/mL  --  138.3   TROPONIN T ng/mL  --  <0.010   PROCALCITONIN ng/mL  --  0.29*   LACTATE mmol/L 0.5  --        Brief Urine Lab Results  (Last result in the past 365 days)      Color   Clarity   Blood   Leuk Est   Nitrite   Protein   CREAT   Urine HCG        12/08/19 1140 Yellow Clear Negative Negative Negative Negative               Microbiology Results Abnormal     Procedure Component Value - Date/Time    Blood Culture - Blood, Arm, Right [[id number]] Collected:  12/08/19 1031    Lab Status:  Preliminary result Specimen:  Blood from Arm, Right Updated:  12/11/19 1054     Blood Culture No growth at 3 days    Blood Culture - Blood, Blood, PICC Line [[id number]] Collected:  12/08/19 1039    Lab Status:  Preliminary result Specimen:  Blood, PICC Line Updated:  12/11/19 1054     Blood Culture No growth at 3 days    Respiratory Panel, PCR - Swab, Nasopharynx [[id number]]  (Normal) Collected:  12/08/19 1037    Lab Status:  Final result Specimen:  Swab from Nasopharynx Updated:  12/08/19 1156     ADENOVIRUS, PCR Not Detected     Coronavirus 229E Not Detected     Coronavirus HKU1 Not Detected     Coronavirus NL63 Not Detected     Coronavirus OC43 Not Detected     Human Metapneumovirus Not Detected     Human Rhinovirus/Enterovirus Not Detected     Influenza B PCR Not Detected     Parainfluenza Virus 1 Not Detected     Parainfluenza Virus 2 Not Detected     Parainfluenza Virus 3 Not Detected     Parainfluenza Virus 4 Not Detected     Bordetella pertussis pcr Not Detected     Influenza A H1 2009 PCR Not Detected     Chlamydophila pneumoniae PCR Not Detected     Mycoplasma  pneumo by PCR Not Detected     Influenza A PCR Not Detected     Influenza A H3 Not Detected     Influenza A H1 Not Detected     RSV, PCR Not Detected          Xr Chest 2 View    Result Date: 12/8/2019  Impression:  1. No active cardiopulmonary disease 2. Left-sided [id number] remains in good position. 3. No significant change from 08/30/2019  Electronically Signed By-Parminder Elmore Jr. On:12/8/2019 11:24 AM This report was finalized on [id number] by  Parminder Elmore Jr., .                Results for orders placed during the hospital encounter of 10/01/19   Adult Transthoracic Echo Complete W/ Cont if Necessary Per Protocol    Narrative · Left atrial cavity size is borderline dilated.       Normal LV size and contractility EF of 60-65%  Normal RV size  Borderline left atrial enlargement  Aortic valve, mitral valve, tricuspid valve appears structurally normal,   no significant regurgitation seen.  No pericardial effusion seen.  Proximal aorta appears normal in size.                 Test Results Pending at Discharge   Order Current Status    Blood Culture - Blood, Arm, Right Preliminary result    Blood Culture - Blood, Blood, PICC Line Preliminary result            Procedures Performed     none      Consults:   Consults     Date and Time Order Name Status Description    12/9/2019 0030 Inpatient Hematology & Oncology Consult Completed     12/8/2019 1122 Hospitalist (on-call MD unless specified)              Discharge Details        Discharge Medications      New Medications      Instructions Start Date   cefdinir 300 MG capsule  Commonly known as:  OMNICEF   300 mg, Oral, 2 Times Daily         Changes to Medications      Instructions Start Date   potassium chloride 20 MEQ CR tablet  Commonly known as:  K-DUR,KLOR-CON  What changed:  Another medication with the same name was changed. Make sure you understand how and when to take each.   40 mEq, Oral, Nightly      potassium chloride 20 MEQ CR tablet  Commonly known as:   SHAWN OSCAR  What changed:  how much to take   40 mEq, Oral, Every Morning      warfarin 5 MG tablet  Commonly known as:  COUMADIN  What changed:    · medication strength  · how much to take  · how to take this  · when to take this  · additional instructions   Active thrombosis         Continue These Medications      Instructions Start Date   acetaminophen 500 MG tablet  Commonly known as:  TYLENOL   1,000 mg, Oral, Every 6 Hours PRN      atorvastatin 20 MG tablet  Commonly known as:  LIPITOR   20 mg, Oral, Every Night at Bedtime      cyanocobalamin 1000 MCG/ML injection   1,000 mcg, Intramuscular, Every 28 Days      cyanocobalamin 1000 MCG/ML injection   INJECT 1 ML INTRAMUSCULARLY EVERY MONTH      famotidine 40 MG tablet  Commonly known as:  PEPCID   40 mg, Oral, Daily      LYRICA 100 MG capsule  Generic drug:  pregabalin   100 mg, Oral, 3 Times Daily      magnesium oxide 400 MG tablet  Commonly known as:  MAG-OX   400 mg, Oral, 2 Times Daily      ondansetron ODT 8 MG disintegrating tablet  Commonly known as:  ZOFRAN-ODT   8 mg, Oral, Every 8 Hours PRN      oxyCODONE 10 MG tablet  Commonly known as:  ROXICODONE   10 mg, Oral, 3 Times Daily PRN      promethazine 25 MG tablet  Commonly known as:  PHENERGAN   25 mg, Oral, Every 6 Hours PRN      sertraline 50 MG tablet  Commonly known as:  ZOLOFT   50 mg, Oral, Every Evening      temazepam 30 MG capsule  Commonly known as:  RESTORIL   30 mg, Oral, Nightly PRN      triamterene-hydrochlorothiazide 37.5-25 MG per capsule  Commonly known as:  DYAZIDE   1 capsule, Oral, Every Morning             Allergies   Allergen Reactions   • Morphine Nausea Only and Hives   • Penicillin V Potassium Rash   • Sulfa Antibiotics Rash   • Levaquin [Levofloxacin] Nausea Only and Dizziness     When taken with Coumadin   • Amoxicillin Rash   • Norco [Hydrocodone-Acetaminophen] Rash         Discharge Disposition:  Home or Self Care    Diet:  Hospital:  Diet Order   Procedures   • Diet  Cardiac, Diabetic/Consistent Carbs; Healthy Heart; Diabetic - Consistent Carb         Discharge Activity:   Activity Instructions     Activity as Tolerated              CODE STATUS:    Code Status and Medical Interventions:   Ordered at: 12/08/19 1537     Level Of Support Discussed With:    Patient     Code Status:    CPR     Medical Interventions (Level of Support Prior to Arrest):    Full         Follow-up Appointments  Future Appointments   Date Time Provider Department Center   12/13/2019  8:15 AM LAB [facility] ONC LAB NA [facility] None   12/13/2019  8:30 AM INF ROOM 28 - CHAIR A  [facility]   12/13/2019  8:30 AM RN REVIEW ROOM  [facility]   12/20/2019  9:00 AM INF ROOM 27 - CHAIR A  [facility]   12/27/2019  9:00 AM INF ROOM 27 - CHAIR A  [facility]   1/3/2020 10:00 AM INF ROOM 26 - CHAIR A  [facility]   1/6/2020  8:30 AM Fito Ram MD [facility]  NA None       Additional Instructions for the Follow-ups that You Need to Schedule     Call MD With Problems / Concerns   As directed      Instructions: Call [phone] or email [email or web address] for problems or concerns.    Order Comments:  Instructions: Call [phone] or email [email or web address] for problems or concerns.          Discharge Follow-up with PCP   As directed       Currently Documented PCP:    Noemy Queen MD    PCP Phone Number:    [phone]     Follow Up Details:  1 week         Discharge Follow-up with Specified Provider: [facility]   As directed      To:  [facility]    Follow Up Details:  Friday                 Condition on Discharge:      Stable          Electronically signed by IRENA Geiger, 12/11/19, 10:30 AM.    Patient was seen and examined and chart reviewed and documentation changed appropriately.  I spent 40 minutes on this discharge speaking with the patient, talking to the bedside nurse, talking to the pharmacy, talking to the  oncology nurse practitioner, documenting in the chart and counseling the patient.  I agree with the assessment and plan scribed by Francine ERAZO.  Inez Gudino MD

## 2019-12-12 ENCOUNTER — READMISSION MANAGEMENT (OUTPATIENT)
Dept: CALL CENTER | Facility: HOSPITAL | Age: 64
End: 2019-12-12

## 2019-12-12 NOTE — PAYOR COMM NOTE
"Discharge notice.     Pending inpatient authorization request.        RETURN CONTACT  LEISA MENJIVAR RN   [facility]   [facility].R. /    PH: [phone]  FX: [phone]    Tahira Roblero (64 y.o. Female)     Date of Birth Social Security Number Address Home Phone MRN    1955  [address] [place] [address] [phone] [id number]    Holiness Marital Status          [employer]        Admission Date Admission Type Admitting Provider Attending Provider Department, Room/Bed    12/8/19 Emergency Inez Gudino MD  [facility] 2B MEDICAL INPATIENT, 221/1    Discharge Date Discharge Disposition Discharge Destination        12/11/2019 Home or Self Care              Attending Provider:  (none)   Allergies:  Morphine, Penicillin V Potassium, Sulfa Antibiotics, Levaquin [Levofloxacin], Amoxicillin, Norco [Hydrocodone-acetaminophen]    Isolation:  None   Infection:  None   Code Status:  Prior    Ht:  165.1 cm (65\")   Wt:  82 kg (180 lb 12.4 oz)    Admission Cmt:  None   Principal Problem:  Neutropenic fever (CMS/HCC) [D70.9,R50.81] More...                 Active Insurance as of 12/8/2019     Primary Coverage     Payor Plan Insurance Group Employer/Plan Group    [employer] [place] - [employer] [id number]     Payor Plan Address Payor Plan Phone Number Payor Plan Fax Number Effective Dates    MAIL STOP:   4/1/2017 - None Entered    [address] [address]       [facility] [id number]       Subscriber Name Subscriber Birth Date Member ID       TAHIRA ROBLERO 1955 [id number]                 Emergency Contacts      (Rel.) Home Phone Work Phone Mobile Phone    DANIELLE MOLINA (Daughter) -- -- [phone]               Discharge Summary      Inez Gudino MD at 12/11/19 1030                [facility] Medicine Services  DISCHARGE SUMMARY        Prepared For PCP:  Noemy Queen MD    Patient Name: Tahira FINK " Elsie  : 1955  MRN: [id number]      Date of Admission:   2019    Date of Discharge:  2019    Length of stay:  LOS: 3 days     Hospital Course     Presenting Problem:   Neutropenic fever (CMS/HCC) [D70.9, R50.81]      Active Hospital Problems    Diagnosis  POA   • Malignant neoplasm of right ovary (CMS/HCC) [C56.1]  Yes     Priority: High   • Left upper extremity deep vein thrombosis (CMS/HCC) [I82.622]  Yes     Priority: Medium   • Hypomagnesemia [E83.42]  Yes     Priority: Medium   • Essential hypertension [I10]  Yes     Priority: Medium   • Hyperlipidemia [E78.5]  Yes     Priority: Medium      Resolved Hospital Problems    Diagnosis Date Resolved POA   • **Neutropenic fever (CMS/HCC) [D70.9, R50.81] 2019 Yes     Priority: High         Hospital Course:  Tahira Zimmerman is a 64 y.o. female patient who presented to [facility] on 2019 due to fever and chills. She stated this started two days ago. She has some cough but no phlegm. CXR is clean. No urinary Symptoms. No diarrhea.  WBC is 2.3.   She doesn'tappear to be septic. Vanco/Cefepime were started in ER and continued upon admission until blood cultures are final (no growth so far and pending).  Respiratory virus panel was negative.  She has been taking chemotherapy since  after discovering recurrent ovarian cancer.  Initially she had the disease in . She has been cancer free almost 20 years. So far she has got 14 cycles of chemotherapy. The last one was 2 weeks ago.    Patient was diagnosed with neutropenic fever. Hematology/oncology followed patient during hospitalization.  Patient's recent CT showed a new small left lower lobe lung nodule.  Blood cultures pending.  Respiratory panel was negative.  Patient also was found to have acute on chronic hypomagnesemia and which was replaced.    Patient is now stable for discharge on 2019.  Patient will be discharged home on augmentin for a total of 10 days.  Patient to  follow-up with cancer care center on Friday.  Cancer care center to follow and dose patient's Coumadin level. Patient to follow-up with PCP in 1 week.           Recommendation for Outpatient Providers:       Post monitoring of PT/INR secondary to antibiotic interactions with Coumadin.      Reasons For Change In Medications and Indications for New Medications:    Omnicef- Neutropenic fever, infection- final blood cultures still pending    Day of Discharge         Vital Signs:   Temp:  [98 °F (36.7 °C)-98.5 °F (36.9 °C)] 98 °F (36.7 °C)  Heart Rate:  [46-65] 46  Resp:  [16-18] 16  BP: (111-117)/(68-73) 117/73     Physical Exam:    Well-developed well-nourished in no acute distress sitting up in bed awake and alert; mucous membranes moist; sclerae anicteric; lungs clear to auscultation bilaterally; CV regular rate and rhythm; abdomen soft nontender nondistended; extremities with no edema, cyanosis or calf tenderness; palpable pedal pulses bilaterally; no [place] catheter.    Pertinent  and/or Most Recent Results     Results from last 7 days   Lab Units 12/11/19  0538 12/10/19  0418 12/09/19  0402 12/08/19  1039 12/06/19  1057   WBC 10*3/mm3 2.50* 2.20* 2.00* 2.30* 4.02   HEMOGLOBIN g/dL 10.3* 10.0* 9.4* 10.4* 9.3*   HEMATOCRIT % 31.1* 29.5* 28.1* 31.0* 28.6*   PLATELETS 10*3/mm3 73* 63* 54* 59* 61*   SODIUM mmol/L  --   --  142 137  --    POTASSIUM mmol/L  --   --  3.6 3.5  --    CHLORIDE mmol/L  --   --  107 102  --    CO2 mmol/L  --   --  26.0 26.0  --    BUN mg/dL  --   --  11 14  --    CREATININE mg/dL  --   --  0.70 0.84  --    GLUCOSE mg/dL  --   --  81 92  --    CALCIUM mg/dL  --   --  8.6 8.5*  --      Results from last 7 days   Lab Units 12/11/19  0538 12/10/19  0418 12/09/19  0402 12/08/19  1646 12/08/19  1039 12/06/19  0954   BILIRUBIN mg/dL  --   --   --   --  0.2  --    ALK PHOS U/L  --   --   --   --  119*  --    ALT (SGPT) U/L  --   --   --   --  7  --    AST (SGOT) U/L  --   --   --   --  17  --     PROTIME Seconds 17.9* 36.9* 40.0* 30.0* 28.7*  --    INR  1.85* 3.93* 4.26* 3.19* 3.05* 2.00           Invalid input(s): TG, LDLCALC, LDLREALC  Results from last 7 days   Lab Units 12/08/19  1042 12/08/19  1039   PROBNP pg/mL  --  138.3   TROPONIN T ng/mL  --  <0.010   PROCALCITONIN ng/mL  --  0.29*   LACTATE mmol/L 0.5  --        Brief Urine Lab Results  (Last result in the past 365 days)      Color   Clarity   Blood   Leuk Est   Nitrite   Protein   CREAT   Urine HCG        12/08/19 1140 Yellow Clear Negative Negative Negative Negative               Microbiology Results Abnormal     Procedure Component Value - Date/Time    Blood Culture - Blood, Arm, Right [[id number]] Collected:  12/08/19 1031    Lab Status:  Preliminary result Specimen:  Blood from Arm, Right Updated:  12/11/19 1054     Blood Culture No growth at 3 days    Blood Culture - Blood, Blood, PICC Line [[id number]] Collected:  12/08/19 1039    Lab Status:  Preliminary result Specimen:  Blood, PICC Line Updated:  12/11/19 1054     Blood Culture No growth at 3 days    Respiratory Panel, PCR - Swab, Nasopharynx [[id number]]  (Normal) Collected:  12/08/19 1037    Lab Status:  Final result Specimen:  Swab from Nasopharynx Updated:  12/08/19 1156     ADENOVIRUS, PCR Not Detected     Coronavirus 229E Not Detected     Coronavirus HKU1 Not Detected     Coronavirus NL63 Not Detected     Coronavirus OC43 Not Detected     Human Metapneumovirus Not Detected     Human Rhinovirus/Enterovirus Not Detected     Influenza B PCR Not Detected     Parainfluenza Virus 1 Not Detected     Parainfluenza Virus 2 Not Detected     Parainfluenza Virus 3 Not Detected     Parainfluenza Virus 4 Not Detected     Bordetella pertussis pcr Not Detected     Influenza A H1 2009 PCR Not Detected     Chlamydophila pneumoniae PCR Not Detected     Mycoplasma pneumo by PCR Not Detected     Influenza A PCR Not Detected     Influenza A H3 Not Detected     Influenza A H1 Not Detected     RSV, PCR Not  Detected          Xr Chest 2 View    Result Date: 12/8/2019  Impression:  1. No active cardiopulmonary disease 2. Left-sided Hiqfzk-h-Dgmc remains in good position. 3. No significant change from 08/30/2019  Electronically Signed By-Parminder Elmore Jr. On:12/8/2019 11:24 AM This report was finalized on [id number] by  Parminder Elmore Jr., .                Results for orders placed during the hospital encounter of 10/01/19   Adult Transthoracic Echo Complete W/ Cont if Necessary Per Protocol    Narrative · Left atrial cavity size is borderline dilated.       Normal LV size and contractility EF of 60-65%  Normal RV size  Borderline left atrial enlargement  Aortic valve, mitral valve, tricuspid valve appears structurally normal,   no significant regurgitation seen.  No pericardial effusion seen.  Proximal aorta appears normal in size.                 Test Results Pending at Discharge   Order Current Status    Blood Culture - Blood, Arm, Right Preliminary result    Blood Culture - Blood, Blood, PICC Line Preliminary result            Procedures Performed     none      Consults:   Consults     Date and Time Order Name Status Description    12/9/2019 0030 Inpatient Hematology & Oncology Consult Completed     12/8/2019 1122 Hospitalist (on-call MD unless specified)              Discharge Details        Discharge Medications      New Medications      Instructions Start Date   cefdinir 300 MG capsule  Commonly known as:  OMNICEF   300 mg, Oral, 2 Times Daily         Changes to Medications      Instructions Start Date   potassium chloride 20 MEQ CR tablet  Commonly known as:  K-AXEL CHAUOR-CON  What changed:  Another medication with the same name was changed. Make sure you understand how and when to take each.   40 mEq, Oral, Nightly      potassium chloride 20 MEQ CR tablet  Commonly known as:  K-AXEL CHAUOR-NATHANAEL  What changed:  how much to take   40 mEq, Oral, Every Morning      warfarin 5 MG tablet  Commonly known as:  COUMADIN  What  changed:    · medication strength  · how much to take  · how to take this  · when to take this  · additional instructions   Active thrombosis         Continue These Medications      Instructions Start Date   acetaminophen 500 MG tablet  Commonly known as:  TYLENOL   1,000 mg, Oral, Every 6 Hours PRN      atorvastatin 20 MG tablet  Commonly known as:  LIPITOR   20 mg, Oral, Every Night at Bedtime      cyanocobalamin 1000 MCG/ML injection   1,000 mcg, Intramuscular, Every 28 Days      cyanocobalamin 1000 MCG/ML injection   INJECT 1 ML INTRAMUSCULARLY EVERY MONTH      famotidine 40 MG tablet  Commonly known as:  PEPCID   40 mg, Oral, Daily      LYRICA 100 MG capsule  Generic drug:  pregabalin   100 mg, Oral, 3 Times Daily      magnesium oxide 400 MG tablet  Commonly known as:  MAG-OX   400 mg, Oral, 2 Times Daily      ondansetron ODT 8 MG disintegrating tablet  Commonly known as:  ZOFRAN-ODT   8 mg, Oral, Every 8 Hours PRN      oxyCODONE 10 MG tablet  Commonly known as:  ROXICODONE   10 mg, Oral, 3 Times Daily PRN      promethazine 25 MG tablet  Commonly known as:  PHENERGAN   25 mg, Oral, Every 6 Hours PRN      sertraline 50 MG tablet  Commonly known as:  ZOLOFT   50 mg, Oral, Every Evening      temazepam 30 MG capsule  Commonly known as:  RESTORIL   30 mg, Oral, Nightly PRN      triamterene-hydrochlorothiazide 37.5-25 MG per capsule  Commonly known as:  DYAZIDE   1 capsule, Oral, Every Morning             Allergies   Allergen Reactions   • Morphine Nausea Only and Hives   • Penicillin V Potassium Rash   • Sulfa Antibiotics Rash   • Levaquin [Levofloxacin] Nausea Only and Dizziness     When taken with Coumadin   • Amoxicillin Rash   • Norco [Hydrocodone-Acetaminophen] Rash         Discharge Disposition:  Home or Self Care    Diet:  Hospital:  Diet Order   Procedures   • Diet Cardiac, Diabetic/Consistent Carbs; Healthy Heart; Diabetic - Consistent Carb         Discharge Activity:   Activity Instructions     Activity as  Tolerated              CODE STATUS:    Code Status and Medical Interventions:   Ordered at: 12/08/19 1537     Level Of Support Discussed With:    Patient     Code Status:    CPR     Medical Interventions (Level of Support Prior to Arrest):    Full         Follow-up Appointments  Future Appointments   Date Time Provider Department Center   12/13/2019  8:15 AM LAB [facility] ONC LAB NA [facility] ONAL None   12/13/2019  8:30 AM INF ROOM 28 - CHAIR A  [facility]   12/13/2019  8:30 AM RN REVIEW ROOM  [facility]   12/20/2019  9:00 AM INF ROOM 27 - CHAIR A  [facility]   12/27/2019  9:00 AM INF ROOM 27 - CHAIR A  [facility]   1/3/2020 10:00 AM INF ROOM 26 - CHAIR A  [facility] NA LAG   1/6/2020  8:30 AM Fito Ram MD [facility]  NA None       Additional Instructions for the Follow-ups that You Need to Schedule     Call MD With Problems / Concerns   As directed      Instructions: Call [phone] or email [employer]â„¢ [email or web address] for problems or concerns.    Order Comments:  Instructions: Call [phone] or email [employer]â„[employer] [email or web address] for problems or concerns.          Discharge Follow-up with PCP   As directed       Currently Documented PCP:    Noemy Queen MD    PCP Phone Number:    [phone]     Follow Up Details:  1 week         Discharge Follow-up with Specified Provider: [facility]   As directed      To:  [facility]    Follow Up Details:  Friday                 Condition on Discharge:      Stable          Electronically signed by IRENA Geiger, 12/11/19, 10:30 AM.    Patient was seen and examined and chart reviewed and documentation changed appropriately.  I spent 40 minutes on this discharge speaking with the patient, talking to the bedside nurse, talking to the pharmacy, talking to the oncology nurse practitioner, documenting in the chart and counseling the patient.  I agree with the assessment and plan scribed by Francine Nogueira  [facility].  Inez Gudino MD    Electronically signed by Inez Gudino MD at 12/11/19 9193

## 2019-12-13 ENCOUNTER — LAB (OUTPATIENT)
Dept: LAB | Facility: HOSPITAL | Age: 64
End: 2019-12-13

## 2019-12-13 ENCOUNTER — HOSPITAL ENCOUNTER (OUTPATIENT)
Dept: ONCOLOGY | Facility: HOSPITAL | Age: 64
Setting detail: INFUSION SERIES
Discharge: HOME | End: 2019-12-13

## 2019-12-13 ENCOUNTER — READMISSION MANAGEMENT (OUTPATIENT)
Dept: CALL CENTER | Facility: HOSPITAL | Age: 64
End: 2019-12-13

## 2019-12-13 ENCOUNTER — HOSPITAL ENCOUNTER (OUTPATIENT)
Dept: ONCOLOGY | Facility: HOSPITAL | Age: 64
Discharge: HOME | End: 2019-12-13
Admitting: NURSE PRACTITIONER

## 2019-12-13 VITALS
RESPIRATION RATE: 18 BRPM | BODY MASS INDEX: 30.32 KG/M2 | DIASTOLIC BLOOD PRESSURE: 70 MMHG | SYSTOLIC BLOOD PRESSURE: 115 MMHG | WEIGHT: 182 LBS | TEMPERATURE: 98 F | HEART RATE: 75 BPM | HEIGHT: 65 IN

## 2019-12-13 VITALS
HEART RATE: 75 BPM | TEMPERATURE: 98 F | BODY MASS INDEX: 30.32 KG/M2 | WEIGHT: 182 LBS | DIASTOLIC BLOOD PRESSURE: 70 MMHG | HEIGHT: 65 IN | SYSTOLIC BLOOD PRESSURE: 115 MMHG

## 2019-12-13 DIAGNOSIS — E83.42 HYPOMAGNESEMIA: Primary | ICD-10-CM

## 2019-12-13 DIAGNOSIS — C56.1 MALIGNANT NEOPLASM OF RIGHT OVARY (HCC): ICD-10-CM

## 2019-12-13 DIAGNOSIS — I82.722 CHRONIC DEEP VEIN THROMBOSIS (DVT) OF LEFT UPPER EXTREMITY, UNSPECIFIED VEIN (HCC): Primary | ICD-10-CM

## 2019-12-13 DIAGNOSIS — I82.722 CHRONIC DEEP VEIN THROMBOSIS (DVT) OF LEFT UPPER EXTREMITY, UNSPECIFIED VEIN (HCC): ICD-10-CM

## 2019-12-13 LAB
ALBUMIN SERPL-MCNC: 4 G/DL (ref 3.5–5.2)
ALBUMIN/GLOB SERPL: 1.7 G/DL
ALP SERPL-CCNC: 121 U/L (ref 39–117)
ALT SERPL W P-5'-P-CCNC: 11 U/L (ref 1–33)
ANION GAP SERPL CALCULATED.3IONS-SCNC: 9 MMOL/L (ref 5–15)
AST SERPL-CCNC: 21 U/L (ref 1–32)
BACTERIA SPEC AEROBE CULT: NORMAL
BACTERIA SPEC AEROBE CULT: NORMAL
BASOPHILS # BLD AUTO: 0.01 10*3/MM3 (ref 0–0.2)
BASOPHILS NFR BLD AUTO: 0.3 % (ref 0–1.5)
BILIRUB SERPL-MCNC: 0.2 MG/DL (ref 0.2–1.2)
BUN BLD-MCNC: 17 MG/DL (ref 8–23)
BUN/CREAT SERPL: 23.6 (ref 7–25)
CALCIUM SPEC-SCNC: 9.3 MG/DL (ref 8.6–10.5)
CHLORIDE SERPL-SCNC: 105 MMOL/L (ref 98–107)
CO2 SERPL-SCNC: 27 MMOL/L (ref 22–29)
CREAT BLD-MCNC: 0.72 MG/DL (ref 0.57–1)
DEPRECATED RDW RBC AUTO: 61.7 FL (ref 37–54)
EOSINOPHIL # BLD AUTO: 0.15 10*3/MM3 (ref 0–0.4)
EOSINOPHIL NFR BLD AUTO: 4.3 % (ref 0.3–6.2)
ERYTHROCYTE [DISTWIDTH] IN BLOOD BY AUTOMATED COUNT: 16 % (ref 12.3–15.4)
GFR SERPL CREATININE-BSD FRML MDRD: 82 ML/MIN/1.73
GLOBULIN UR ELPH-MCNC: 2.4 GM/DL
GLUCOSE BLD-MCNC: 75 MG/DL (ref 65–99)
HCT VFR BLD AUTO: 31.4 % (ref 34–46.6)
HGB BLD-MCNC: 10.1 G/DL (ref 12–15.9)
INR PPP: 1.5
LYMPHOCYTES # BLD AUTO: 1.13 10*3/MM3 (ref 0.7–3.1)
LYMPHOCYTES NFR BLD AUTO: 32.7 % (ref 19.6–45.3)
MAGNESIUM SERPL-MCNC: 1.5 MG/DL (ref 1.6–2.4)
MCH RBC QN AUTO: 35.7 PG (ref 26.6–33)
MCHC RBC AUTO-ENTMCNC: 32.2 G/DL (ref 31.5–35.7)
MCV RBC AUTO: 111 FL (ref 79–97)
MONOCYTES # BLD AUTO: 0.69 10*3/MM3 (ref 0.1–0.9)
MONOCYTES NFR BLD AUTO: 19.9 % (ref 5–12)
NEUTROPHILS # BLD AUTO: 1.48 10*3/MM3 (ref 1.7–7)
NEUTROPHILS NFR BLD AUTO: 42.8 % (ref 42.7–76)
PLATELET # BLD AUTO: 85 10*3/MM3 (ref 140–450)
PMV BLD AUTO: 11.8 FL (ref 6–12)
POTASSIUM BLD-SCNC: 4.4 MMOL/L (ref 3.5–5.2)
PROT SERPL-MCNC: 6.4 G/DL (ref 6–8.5)
RBC # BLD AUTO: 2.83 10*6/MM3 (ref 3.77–5.28)
SODIUM BLD-SCNC: 141 MMOL/L (ref 136–145)
WBC NRBC COR # BLD: 3.46 10*3/MM3 (ref 3.4–10.8)

## 2019-12-13 PROCEDURE — 96365 THER/PROPH/DIAG IV INF INIT: CPT | Performed by: INTERNAL MEDICINE

## 2019-12-13 PROCEDURE — 83735 ASSAY OF MAGNESIUM: CPT | Performed by: INTERNAL MEDICINE

## 2019-12-13 PROCEDURE — 85025 COMPLETE CBC W/AUTO DIFF WBC: CPT | Performed by: INTERNAL MEDICINE

## 2019-12-13 PROCEDURE — 80053 COMPREHEN METABOLIC PANEL: CPT | Performed by: NURSE PRACTITIONER

## 2019-12-13 PROCEDURE — 25010000002 MAGNESIUM SULFATE 2 GM/50ML SOLUTION: Performed by: INTERNAL MEDICINE

## 2019-12-13 PROCEDURE — 85610 PROTHROMBIN TIME: CPT

## 2019-12-13 RX ORDER — SODIUM CHLORIDE 0.9 % (FLUSH) 0.9 %
10 SYRINGE (ML) INJECTION AS NEEDED
Status: CANCELLED | OUTPATIENT
Start: 2019-12-13

## 2019-12-13 RX ORDER — SODIUM CHLORIDE 0.9 % (FLUSH) 0.9 %
10 SYRINGE (ML) INJECTION AS NEEDED
Status: DISCONTINUED | OUTPATIENT
Start: 2019-12-13 | End: 2019-12-14 | Stop reason: HOSPADM

## 2019-12-13 RX ORDER — MAGNESIUM SULFATE HEPTAHYDRATE 40 MG/ML
2 INJECTION, SOLUTION INTRAVENOUS ONCE
Status: COMPLETED | OUTPATIENT
Start: 2019-12-13 | End: 2019-12-13

## 2019-12-13 RX ORDER — SODIUM CHLORIDE 9 MG/ML
250 INJECTION, SOLUTION INTRAVENOUS ONCE
Status: DISCONTINUED | OUTPATIENT
Start: 2019-12-13 | End: 2019-12-14 | Stop reason: HOSPADM

## 2019-12-13 RX ADMIN — Medication 10 ML: at 10:15

## 2019-12-13 RX ADMIN — HEPARIN 500 UNITS: 100 SYRINGE at 10:17

## 2019-12-13 RX ADMIN — MAGNESIUM SULFATE HEPTAHYDRATE 2 G: 40 INJECTION, SOLUTION INTRAVENOUS at 09:10

## 2019-12-13 NOTE — OUTREACH NOTE
Prep Survey      Responses   Facility patient discharged from?  [facility]   Is patient eligible?  Yes   Discharge diagnosis  Neutropenic fever    Does the patient have one of the following disease processes/diagnoses(primary or secondary)?  Other   Does the patient have Home health ordered?  No   Is there a DME ordered?  No   Prep survey completed?  Yes          Kaelyn Gomez RN

## 2019-12-13 NOTE — OUTREACH NOTE
Medical Week 1 Survey      Responses   Facility patient discharged from?  [facility]   Does the patient have one of the following disease processes/diagnoses(primary or secondary)?  Other   Is there a successful TCM telephone encounter documented?  No   Week 1 attempt successful?  No   Unsuccessful attempts  Attempt 4 [NO ANSWER, LEFT VM]          Mimi Xiong LPN

## 2019-12-17 ENCOUNTER — HOSPITAL ENCOUNTER (OUTPATIENT)
Dept: ONCOLOGY | Facility: HOSPITAL | Age: 64
Discharge: HOME | End: 2019-12-17
Admitting: NURSE PRACTITIONER

## 2019-12-17 ENCOUNTER — LAB (OUTPATIENT)
Dept: LAB | Facility: HOSPITAL | Age: 64
End: 2019-12-17

## 2019-12-17 VITALS
RESPIRATION RATE: 18 BRPM | BODY MASS INDEX: 30.32 KG/M2 | DIASTOLIC BLOOD PRESSURE: 67 MMHG | WEIGHT: 182 LBS | SYSTOLIC BLOOD PRESSURE: 131 MMHG | HEIGHT: 65 IN | TEMPERATURE: 98.4 F | HEART RATE: 59 BPM

## 2019-12-17 DIAGNOSIS — I82.722 CHRONIC DEEP VEIN THROMBOSIS (DVT) OF LEFT UPPER EXTREMITY, UNSPECIFIED VEIN (HCC): ICD-10-CM

## 2019-12-17 DIAGNOSIS — I82.722 CHRONIC DEEP VEIN THROMBOSIS (DVT) OF LEFT UPPER EXTREMITY, UNSPECIFIED VEIN (HCC): Primary | ICD-10-CM

## 2019-12-17 LAB — INR PPP: 1.8

## 2019-12-17 PROCEDURE — 85610 PROTHROMBIN TIME: CPT

## 2019-12-19 DIAGNOSIS — C56.1 MALIGNANT NEOPLASM OF RIGHT OVARY (HCC): ICD-10-CM

## 2019-12-19 RX ORDER — DIPHENHYDRAMINE HYDROCHLORIDE 50 MG/ML
50 INJECTION INTRAMUSCULAR; INTRAVENOUS AS NEEDED
Status: CANCELLED | OUTPATIENT
Start: 2019-12-20

## 2019-12-19 RX ORDER — FAMOTIDINE 10 MG/ML
20 INJECTION, SOLUTION INTRAVENOUS AS NEEDED
Status: CANCELLED | OUTPATIENT
Start: 2019-12-20

## 2019-12-19 RX ORDER — PALONOSETRON 0.05 MG/ML
0.25 INJECTION, SOLUTION INTRAVENOUS ONCE
Status: CANCELLED | OUTPATIENT
Start: 2019-12-20

## 2019-12-19 RX ORDER — DEXTROSE MONOHYDRATE 50 MG/ML
250 INJECTION, SOLUTION INTRAVENOUS ONCE
Status: CANCELLED | OUTPATIENT
Start: 2019-12-20

## 2019-12-20 ENCOUNTER — LAB (OUTPATIENT)
Dept: LAB | Facility: HOSPITAL | Age: 64
End: 2019-12-20

## 2019-12-20 ENCOUNTER — HOSPITAL ENCOUNTER (OUTPATIENT)
Dept: ONCOLOGY | Facility: HOSPITAL | Age: 64
Discharge: HOME | End: 2019-12-20
Admitting: NURSE PRACTITIONER

## 2019-12-20 ENCOUNTER — HOSPITAL ENCOUNTER (OUTPATIENT)
Dept: ONCOLOGY | Facility: HOSPITAL | Age: 64
Setting detail: INFUSION SERIES
Discharge: HOME | End: 2019-12-20

## 2019-12-20 VITALS — BODY MASS INDEX: 30.32 KG/M2 | HEIGHT: 65 IN | RESPIRATION RATE: 18 BRPM | WEIGHT: 182 LBS

## 2019-12-20 VITALS
SYSTOLIC BLOOD PRESSURE: 130 MMHG | DIASTOLIC BLOOD PRESSURE: 79 MMHG | HEART RATE: 54 BPM | WEIGHT: 182.9 LBS | TEMPERATURE: 97.9 F | BODY MASS INDEX: 30.47 KG/M2 | HEIGHT: 65 IN

## 2019-12-20 DIAGNOSIS — E83.42 HYPOMAGNESEMIA: Primary | ICD-10-CM

## 2019-12-20 DIAGNOSIS — I82.722 CHRONIC DEEP VEIN THROMBOSIS (DVT) OF LEFT UPPER EXTREMITY, UNSPECIFIED VEIN (HCC): ICD-10-CM

## 2019-12-20 DIAGNOSIS — C56.1 MALIGNANT NEOPLASM OF RIGHT OVARY (HCC): ICD-10-CM

## 2019-12-20 DIAGNOSIS — I82.722 CHRONIC DEEP VEIN THROMBOSIS (DVT) OF LEFT UPPER EXTREMITY, UNSPECIFIED VEIN (HCC): Primary | ICD-10-CM

## 2019-12-20 LAB
ALBUMIN SERPL-MCNC: 4 G/DL (ref 3.5–5.2)
ALBUMIN/GLOB SERPL: 1.7 G/DL
ALP SERPL-CCNC: 118 U/L (ref 39–117)
ALT SERPL W P-5'-P-CCNC: 11 U/L (ref 1–33)
ANION GAP SERPL CALCULATED.3IONS-SCNC: 10 MMOL/L (ref 5–15)
AST SERPL-CCNC: 22 U/L (ref 1–32)
BASOPHILS # BLD AUTO: 0.03 10*3/MM3 (ref 0–0.2)
BASOPHILS NFR BLD AUTO: 0.8 % (ref 0–1.5)
BILIRUB SERPL-MCNC: 0.2 MG/DL (ref 0.2–1.2)
BUN BLD-MCNC: 16 MG/DL (ref 8–23)
BUN/CREAT SERPL: 19 (ref 7–25)
CALCIUM SPEC-SCNC: 9.5 MG/DL (ref 8.6–10.5)
CHLORIDE SERPL-SCNC: 106 MMOL/L (ref 98–107)
CO2 SERPL-SCNC: 26 MMOL/L (ref 22–29)
CREAT BLD-MCNC: 0.84 MG/DL (ref 0.57–1)
CREAT BLDA-MCNC: 0.8 MG/DL (ref 0.6–1.3)
DEPRECATED RDW RBC AUTO: 60.1 FL (ref 37–54)
EOSINOPHIL # BLD AUTO: 0.08 10*3/MM3 (ref 0–0.4)
EOSINOPHIL NFR BLD AUTO: 2.2 % (ref 0.3–6.2)
ERYTHROCYTE [DISTWIDTH] IN BLOOD BY AUTOMATED COUNT: 15.3 % (ref 12.3–15.4)
GFR SERPL CREATININE-BSD FRML MDRD: 68 ML/MIN/1.73
GLOBULIN UR ELPH-MCNC: 2.3 GM/DL
GLUCOSE BLD-MCNC: 69 MG/DL (ref 65–99)
HCT VFR BLD AUTO: 33.2 % (ref 34–46.6)
HGB BLD-MCNC: 10.7 G/DL (ref 12–15.9)
INR PPP: 1.8
LYMPHOCYTES # BLD AUTO: 1.07 10*3/MM3 (ref 0.7–3.1)
LYMPHOCYTES NFR BLD AUTO: 29.5 % (ref 19.6–45.3)
MAGNESIUM SERPL-MCNC: 1.4 MG/DL (ref 1.6–2.4)
MCH RBC QN AUTO: 35.7 PG (ref 26.6–33)
MCHC RBC AUTO-ENTMCNC: 32.2 G/DL (ref 31.5–35.7)
MCV RBC AUTO: 110.7 FL (ref 79–97)
MONOCYTES # BLD AUTO: 0.83 10*3/MM3 (ref 0.1–0.9)
MONOCYTES NFR BLD AUTO: 22.9 % (ref 5–12)
NEUTROPHILS # BLD AUTO: 1.62 10*3/MM3 (ref 1.7–7)
NEUTROPHILS NFR BLD AUTO: 44.6 % (ref 42.7–76)
PLATELET # BLD AUTO: 150 10*3/MM3 (ref 140–450)
PMV BLD AUTO: 10.6 FL (ref 6–12)
POTASSIUM BLD-SCNC: 4.4 MMOL/L (ref 3.5–5.2)
PROT SERPL-MCNC: 6.3 G/DL (ref 6–8.5)
RBC # BLD AUTO: 3 10*6/MM3 (ref 3.77–5.28)
SODIUM BLD-SCNC: 142 MMOL/L (ref 136–145)
WBC NRBC COR # BLD: 3.63 10*3/MM3 (ref 3.4–10.8)

## 2019-12-20 PROCEDURE — 25010000002 DOXORUBICIN LIPOSOMAL PER 10 MG: Performed by: NURSE PRACTITIONER

## 2019-12-20 PROCEDURE — 25010000002 PEGFILGRASTIM 6 MG/0.6ML PREFILLED SYRINGE KIT: Performed by: NURSE PRACTITIONER

## 2019-12-20 PROCEDURE — 82565 ASSAY OF CREATININE: CPT

## 2019-12-20 PROCEDURE — 25010000002 DEXAMETHASONE SODIUM PHOSPHATE 100 MG/10ML SOLUTION: Performed by: NURSE PRACTITIONER

## 2019-12-20 PROCEDURE — 80053 COMPREHEN METABOLIC PANEL: CPT | Performed by: NURSE PRACTITIONER

## 2019-12-20 PROCEDURE — 85025 COMPLETE CBC W/AUTO DIFF WBC: CPT | Performed by: NURSE PRACTITIONER

## 2019-12-20 PROCEDURE — 96367 TX/PROPH/DG ADDL SEQ IV INF: CPT | Performed by: INTERNAL MEDICINE

## 2019-12-20 PROCEDURE — 96417 CHEMO IV INFUS EACH ADDL SEQ: CPT | Performed by: INTERNAL MEDICINE

## 2019-12-20 PROCEDURE — 25010000002 CARBOPLATIN PER 50 MG: Performed by: NURSE PRACTITIONER

## 2019-12-20 PROCEDURE — 96413 CHEMO IV INFUSION 1 HR: CPT | Performed by: INTERNAL MEDICINE

## 2019-12-20 PROCEDURE — 96377 APPLICATON ON-BODY INJECTOR: CPT | Performed by: INTERNAL MEDICINE

## 2019-12-20 PROCEDURE — 83735 ASSAY OF MAGNESIUM: CPT | Performed by: NURSE PRACTITIONER

## 2019-12-20 PROCEDURE — 85610 PROTHROMBIN TIME: CPT

## 2019-12-20 PROCEDURE — 25010000002 PALONOSETRON 0.25 MG/5ML SOLUTION PREFILLED SYRINGE: Performed by: NURSE PRACTITIONER

## 2019-12-20 PROCEDURE — 25010000002 FOSAPREPITANT PER 1 MG: Performed by: NURSE PRACTITIONER

## 2019-12-20 PROCEDURE — 25010000002 MAGNESIUM SULFATE 2 GM/50ML SOLUTION: Performed by: NURSE PRACTITIONER

## 2019-12-20 PROCEDURE — 96375 TX/PRO/DX INJ NEW DRUG ADDON: CPT | Performed by: INTERNAL MEDICINE

## 2019-12-20 RX ORDER — SODIUM CHLORIDE 9 MG/ML
250 INJECTION, SOLUTION INTRAVENOUS ONCE
Status: DISCONTINUED | OUTPATIENT
Start: 2019-12-20 | End: 2019-12-21 | Stop reason: HOSPADM

## 2019-12-20 RX ORDER — DEXTROSE MONOHYDRATE 50 MG/ML
250 INJECTION, SOLUTION INTRAVENOUS ONCE
Status: DISCONTINUED | OUTPATIENT
Start: 2019-12-20 | End: 2019-12-21 | Stop reason: HOSPADM

## 2019-12-20 RX ORDER — SODIUM CHLORIDE 0.9 % (FLUSH) 0.9 %
10 SYRINGE (ML) INJECTION AS NEEDED
Status: CANCELLED | OUTPATIENT
Start: 2019-12-20

## 2019-12-20 RX ORDER — SODIUM CHLORIDE 0.9 % (FLUSH) 0.9 %
10 SYRINGE (ML) INJECTION AS NEEDED
Status: DISCONTINUED | OUTPATIENT
Start: 2019-12-20 | End: 2019-12-21 | Stop reason: HOSPADM

## 2019-12-20 RX ORDER — PALONOSETRON HYDROCHLORIDE 0.05 MG/ML
0.25 INJECTION, SOLUTION INTRAVENOUS ONCE
Status: COMPLETED | OUTPATIENT
Start: 2019-12-20 | End: 2019-12-20

## 2019-12-20 RX ORDER — MAGNESIUM SULFATE HEPTAHYDRATE 40 MG/ML
2 INJECTION, SOLUTION INTRAVENOUS ONCE
Status: COMPLETED | OUTPATIENT
Start: 2019-12-20 | End: 2019-12-20

## 2019-12-20 RX ADMIN — PEGFILGRASTIM 6 MG: KIT SUBCUTANEOUS at 13:31

## 2019-12-20 RX ADMIN — SODIUM CHLORIDE 100 ML: 900 INJECTION, SOLUTION INTRAVENOUS at 10:55

## 2019-12-20 RX ADMIN — PALONOSETRON 0.25 MG: 0.25 INJECTION, SOLUTION INTRAVENOUS at 10:53

## 2019-12-20 RX ADMIN — HEPARIN 500 UNITS: 100 SYRINGE at 13:31

## 2019-12-20 RX ADMIN — CARBOPLATIN 591 MG: 10 INJECTION, SOLUTION INTRAVENOUS at 12:53

## 2019-12-20 RX ADMIN — DOXORUBICIN HYDROCHLORIDE 56 MG: 2 INJECTABLE, LIPOSOMAL INTRAVENOUS at 11:43

## 2019-12-20 RX ADMIN — MAGNESIUM SULFATE HEPTAHYDRATE 2 G: 40 INJECTION, SOLUTION INTRAVENOUS at 09:56

## 2019-12-20 RX ADMIN — DEXAMETHASONE SODIUM PHOSPHATE 8 MG: 10 INJECTION, SOLUTION INTRAMUSCULAR; INTRAVENOUS at 11:26

## 2019-12-20 RX ADMIN — Medication 10 ML: at 13:31

## 2019-12-24 ENCOUNTER — READMISSION MANAGEMENT (OUTPATIENT)
Dept: CALL CENTER | Facility: HOSPITAL | Age: 64
End: 2019-12-24

## 2019-12-24 NOTE — OUTREACH NOTE
Medical Week 2 Survey      Responses   Facility patient discharged from?  [facility]   Does the patient have one of the following disease processes/diagnoses(primary or secondary)?  Other   Week 2 attempt successful?  Yes   Call start time  0902   Discharge diagnosis  Neutropenic fever    Call end time  0903   Is patient permission given to speak with other caregiver?  No   Meds reviewed with patient/caregiver?  Yes   Is the patient taking all medications as directed (includes completed medication regime)?  Yes   Medication comments  Patient denies any medication issues or questions today.    Does the patient have a primary care provider?   Yes   Comments regarding PCP  Noemy Queen MD PCP   Has the patient kept scheduled appointments due by today?  Yes   Comments  Patient denies needing any assistance with getting appts.    Has home health visited the patient within 72 hours of discharge?  N/A   Did the patient receive a copy of their discharge instructions?  Yes   Nursing interventions  Reviewed instructions with patient   What is the patient's perception of their health status since discharge?  Improving   Is the patient/caregiver able to teach back signs and symptoms related to disease process for when to call PCP?  Yes   Is the patient/caregiver able to teach back signs and symptoms related to disease process for when to call 911?  Yes   Is the patient/caregiver able to teach back the hierarchy of who to call/visit for symptoms/problems? PCP, Specialist, Home health nurse, Urgent Care, ED, 911  Yes   Week 2 Call Completed?  Yes   Revoked  No further contact(revokes)-requires comment          Amada Mayberry RN

## 2019-12-27 ENCOUNTER — HOSPITAL ENCOUNTER (OUTPATIENT)
Dept: ONCOLOGY | Facility: HOSPITAL | Age: 64
Setting detail: INFUSION SERIES
Discharge: HOME | End: 2019-12-27

## 2019-12-27 ENCOUNTER — LAB (OUTPATIENT)
Dept: LAB | Facility: HOSPITAL | Age: 64
End: 2019-12-27

## 2019-12-27 ENCOUNTER — HOSPITAL ENCOUNTER (OUTPATIENT)
Dept: ONCOLOGY | Facility: HOSPITAL | Age: 64
Discharge: HOME | End: 2019-12-27
Admitting: NURSE PRACTITIONER

## 2019-12-27 VITALS — BODY MASS INDEX: 30.16 KG/M2 | RESPIRATION RATE: 18 BRPM | HEIGHT: 65 IN | WEIGHT: 181 LBS

## 2019-12-27 VITALS
BODY MASS INDEX: 30.12 KG/M2 | HEART RATE: 55 BPM | DIASTOLIC BLOOD PRESSURE: 76 MMHG | HEIGHT: 65 IN | SYSTOLIC BLOOD PRESSURE: 129 MMHG | TEMPERATURE: 97.9 F

## 2019-12-27 DIAGNOSIS — T45.1X5A PANCYTOPENIA DUE TO ANTINEOPLASTIC CHEMOTHERAPY (HCC): Primary | ICD-10-CM

## 2019-12-27 DIAGNOSIS — E83.42 HYPOMAGNESEMIA: ICD-10-CM

## 2019-12-27 DIAGNOSIS — C56.1 MALIGNANT NEOPLASM OF RIGHT OVARY (HCC): ICD-10-CM

## 2019-12-27 DIAGNOSIS — I82.722 CHRONIC DEEP VEIN THROMBOSIS (DVT) OF LEFT UPPER EXTREMITY, UNSPECIFIED VEIN (HCC): Primary | ICD-10-CM

## 2019-12-27 DIAGNOSIS — I82.722 CHRONIC DEEP VEIN THROMBOSIS (DVT) OF LEFT UPPER EXTREMITY, UNSPECIFIED VEIN (HCC): ICD-10-CM

## 2019-12-27 DIAGNOSIS — D64.81 ANTINEOPLASTIC CHEMOTHERAPY INDUCED ANEMIA: ICD-10-CM

## 2019-12-27 DIAGNOSIS — T45.1X5A ANTINEOPLASTIC CHEMOTHERAPY INDUCED ANEMIA: ICD-10-CM

## 2019-12-27 DIAGNOSIS — D61.810 PANCYTOPENIA DUE TO ANTINEOPLASTIC CHEMOTHERAPY (HCC): Primary | ICD-10-CM

## 2019-12-27 LAB
BASOPHILS # BLD AUTO: 0.01 10*3/MM3 (ref 0–0.2)
BASOPHILS NFR BLD AUTO: 0.1 % (ref 0–1.5)
DEPRECATED RDW RBC AUTO: 56.5 FL (ref 37–54)
EOSINOPHIL # BLD AUTO: 0.04 10*3/MM3 (ref 0–0.4)
EOSINOPHIL NFR BLD AUTO: 0.5 % (ref 0.3–6.2)
ERYTHROCYTE [DISTWIDTH] IN BLOOD BY AUTOMATED COUNT: 14.6 % (ref 12.3–15.4)
HCT VFR BLD AUTO: 33.3 % (ref 34–46.6)
HGB BLD-MCNC: 10.7 G/DL (ref 12–15.9)
INR PPP: 1.5
LYMPHOCYTES # BLD AUTO: 1.11 10*3/MM3 (ref 0.7–3.1)
LYMPHOCYTES NFR BLD AUTO: 14.9 % (ref 19.6–45.3)
MAGNESIUM SERPL-MCNC: 1.3 MG/DL (ref 1.6–2.4)
MCH RBC QN AUTO: 35.2 PG (ref 26.6–33)
MCHC RBC AUTO-ENTMCNC: 32.1 G/DL (ref 31.5–35.7)
MCV RBC AUTO: 109.5 FL (ref 79–97)
MONOCYTES # BLD AUTO: 0.82 10*3/MM3 (ref 0.1–0.9)
MONOCYTES NFR BLD AUTO: 11 % (ref 5–12)
NEUTROPHILS # BLD AUTO: 5.48 10*3/MM3 (ref 1.7–7)
NEUTROPHILS NFR BLD AUTO: 73.5 % (ref 42.7–76)
PLATELET # BLD AUTO: 96 10*3/MM3 (ref 140–450)
PMV BLD AUTO: 11.7 FL (ref 6–12)
RBC # BLD AUTO: 3.04 10*6/MM3 (ref 3.77–5.28)
WBC NRBC COR # BLD: 7.46 10*3/MM3 (ref 3.4–10.8)

## 2019-12-27 PROCEDURE — 25010000002 MAGNESIUM SULFATE 2 GM/50ML SOLUTION: Performed by: INTERNAL MEDICINE

## 2019-12-27 PROCEDURE — 83735 ASSAY OF MAGNESIUM: CPT | Performed by: NURSE PRACTITIONER

## 2019-12-27 PROCEDURE — 85025 COMPLETE CBC W/AUTO DIFF WBC: CPT | Performed by: NURSE PRACTITIONER

## 2019-12-27 PROCEDURE — 85610 PROTHROMBIN TIME: CPT

## 2019-12-27 PROCEDURE — 96365 THER/PROPH/DIAG IV INF INIT: CPT | Performed by: INTERNAL MEDICINE

## 2019-12-27 RX ORDER — SODIUM CHLORIDE 0.9 % (FLUSH) 0.9 %
10 SYRINGE (ML) INJECTION AS NEEDED
Status: CANCELLED | OUTPATIENT
Start: 2019-12-27

## 2019-12-27 RX ORDER — SODIUM CHLORIDE 0.9 % (FLUSH) 0.9 %
10 SYRINGE (ML) INJECTION AS NEEDED
Status: DISCONTINUED | OUTPATIENT
Start: 2019-12-27 | End: 2019-12-28 | Stop reason: HOSPADM

## 2019-12-27 RX ORDER — MAGNESIUM SULFATE HEPTAHYDRATE 40 MG/ML
2 INJECTION, SOLUTION INTRAVENOUS ONCE
Status: COMPLETED | OUTPATIENT
Start: 2019-12-27 | End: 2019-12-27

## 2019-12-27 RX ADMIN — MAGNESIUM SULFATE HEPTAHYDRATE 2 G: 40 INJECTION, SOLUTION INTRAVENOUS at 09:44

## 2019-12-27 RX ADMIN — HEPARIN 500 UNITS: 100 SYRINGE at 10:40

## 2019-12-27 RX ADMIN — Medication 10 ML: at 10:40

## 2019-12-30 ENCOUNTER — OFFICE VISIT (OUTPATIENT)
Dept: FAMILY MEDICINE CLINIC | Facility: CLINIC | Age: 64
End: 2019-12-30

## 2019-12-30 VITALS
DIASTOLIC BLOOD PRESSURE: 79 MMHG | TEMPERATURE: 98.4 F | WEIGHT: 182 LBS | SYSTOLIC BLOOD PRESSURE: 148 MMHG | BODY MASS INDEX: 30.29 KG/M2 | HEART RATE: 74 BPM | OXYGEN SATURATION: 98 %

## 2019-12-30 DIAGNOSIS — R30.0 DYSURIA: Primary | ICD-10-CM

## 2019-12-30 LAB
BILIRUB BLD-MCNC: NEGATIVE MG/DL
CLARITY, POC: ABNORMAL
COLOR UR: YELLOW
GLUCOSE UR STRIP-MCNC: NEGATIVE MG/DL
KETONES UR QL: NEGATIVE
LEUKOCYTE EST, POC: ABNORMAL
NITRITE UR-MCNC: NEGATIVE MG/ML
PH UR: 7 [PH] (ref 5–8)
PROT UR STRIP-MCNC: NEGATIVE MG/DL
RBC # UR STRIP: ABNORMAL /UL
SP GR UR: 1.01 (ref 1–1.03)
UROBILINOGEN UR QL: NORMAL

## 2019-12-30 PROCEDURE — 87077 CULTURE AEROBIC IDENTIFY: CPT | Performed by: FAMILY MEDICINE

## 2019-12-30 PROCEDURE — 99213 OFFICE O/P EST LOW 20 MIN: CPT | Performed by: FAMILY MEDICINE

## 2019-12-30 PROCEDURE — 87086 URINE CULTURE/COLONY COUNT: CPT | Performed by: FAMILY MEDICINE

## 2019-12-30 PROCEDURE — 87186 SC STD MICRODIL/AGAR DIL: CPT | Performed by: FAMILY MEDICINE

## 2019-12-30 RX ORDER — TEMAZEPAM 15 MG/1
15 CAPSULE ORAL NIGHTLY PRN
Refills: 2 | COMMUNITY
Start: 2019-12-08 | End: 2020-02-03 | Stop reason: DRUGHIGH

## 2019-12-30 RX ORDER — WARFARIN SODIUM 1 MG/1
1 TABLET ORAL DAILY
Refills: 2 | COMMUNITY
Start: 2019-12-07 | End: 2020-04-02

## 2019-12-30 RX ORDER — NITROFURANTOIN 25; 75 MG/1; MG/1
100 CAPSULE ORAL 2 TIMES DAILY
Qty: 14 CAPSULE | Refills: 0 | Status: SHIPPED | OUTPATIENT
Start: 2019-12-30 | End: 2020-02-21

## 2019-12-30 NOTE — PROGRESS NOTES
Subjective   Chief Complaint   Patient presents with   • Urinary Urgency   • Difficulty Urinating   • Back Pain     Tahira Zimmerman is a 64 y.o. female.     Difficulty Urinating   This is a new problem. The current episode started in the past 7 days. The problem occurs constantly. Associated symptoms include urinary symptoms. Pertinent negatives include no abdominal pain, chest pain, chills, coughing, fever, rash or sore throat. Nothing aggravates the symptoms. Treatments tried: AZO. The treatment provided no relief.   Back Pain   Pertinent negatives include no abdominal pain, chest pain or fever.      Past Medical History:   Diagnosis Date   • Anemia 2013   • Cervical disc disorder 2013    Herniated disc, pinched nerve   • Clotting disorder (CMS/HCC) 2013    Low platelets from chemo   • Colon polyp 2013   • Deep vein thrombosis (CMS/HCC) 2013   • Diverticulosis 2013   • GERD (gastroesophageal reflux disease) 2016   • HL (hearing loss) 2016    I need hearing aids   • Hyperlipidemia 2013    Need my cholesterol rechecked   • Hypertension    • Joint pain 2013    Shoulder pain, torn rotator cuff   • Low back pain 2013   • Neuromuscular disorder (CMS/HCC) 2015    Shingles, pinched nerves in my neck   • Osteopenia 2014   • Osteoporosis    • Ovarian cancer (CMS/HCC)    • Pneumonia 2010    Have had it several times   • Spinal stenosis 2013    Cervical   • Urinary tract infection 2013    Have had several utis     Past Surgical History:   Procedure Laterality Date   • COLON SURGERY     • COLONOSCOPY     • EXPLORATORY LAPAROTOMY     • HERNIA REPAIR     • HYSTERECTOMY     • OOPHORECTOMY       Allergies   Allergen Reactions   • Morphine Nausea Only and Hives   • Penicillin V Potassium Rash   • Sulfa Antibiotics Rash   • Levaquin [Levofloxacin] Nausea Only and Dizziness     When taken with Coumadin   • Amoxicillin Rash   • Norco [Hydrocodone-Acetaminophen] Rash     Social History     Socioeconomic History   • Marital status:       Spouse name: Not on file   • Number of children: Not on file   • Years of education: Not on file   • Highest education level: Not on file   Tobacco Use   • Smoking status: Never Smoker   • Smokeless tobacco: Never Used   Substance and Sexual Activity   • Alcohol use: No     Frequency: Never     Comment: caffeine 32-64oz qd   • Drug use: No   • Sexual activity: Not Currently     Partners: Male     Birth control/protection: Surgical     Social History     Tobacco Use   Smoking Status Never Smoker   Smokeless Tobacco Never Used       family history includes Cancer in her father; Hypertension in her brother, father, mother, and sister; Stroke in her brother.  Current Outpatient Medications on File Prior to Visit   Medication Sig Dispense Refill   • temazepam (RESTORIL) 15 MG capsule Take 15 mg by mouth At Night As Needed.  2   • warfarin (COUMADIN) 1 MG tablet Take 1 mg by mouth Daily.  2   • acetaminophen (TYLENOL) 500 MG tablet Take 1,000 mg by mouth Every 6 (Six) Hours As Needed for Mild Pain .     • atorvastatin (LIPITOR) 20 MG tablet Take 20 mg by mouth every night at bedtime.  3   • cyanocobalamin 1000 MCG/ML injection Inject 1,000 mcg into the appropriate muscle as directed by prescriber Every 28 (Twenty-Eight) Days.     • famotidine (PEPCID) 40 MG tablet Take 40 mg by mouth Daily.     • LYRICA 100 MG capsule Take 1 capsule by mouth 3 (Three) Times a Day. 90 capsule 2   • magnesium oxide (MAG-OX) 400 MG tablet Take 400 mg by mouth 2 (Two) Times a Day.     • ondansetron ODT (ZOFRAN-ODT) 8 MG disintegrating tablet Take 8 mg by mouth Every 8 (Eight) Hours As Needed for Nausea or Vomiting.     • oxyCODONE (ROXICODONE) 10 MG tablet Take 1 tablet by mouth 3 (Three) Times a Day As Needed for Moderate Pain . 90 tablet 0   • potassium chloride (K-DUR,KLOR-CON) 20 MEQ CR tablet Take 2 tablets by mouth Every Morning. 30 tablet 0   • promethazine (PHENERGAN) 25 MG tablet Take 1 tablet by mouth Every 6 (Six) Hours  As Needed for Nausea or Vomiting. 12 tablet 0   • sertraline (ZOLOFT) 50 MG tablet Take 50 mg by mouth Every Evening.     • triamterene-hydrochlorothiazide (DYAZIDE) 37.5-25 MG per capsule Take 1 capsule by mouth Every Morning.     • warfarin (COUMADIN) 5 MG tablet Active thrombosis  Indications: DVT/PE (active thrombosis) 30 tablet 0   • [DISCONTINUED] cefdinir (OMNICEF) 300 MG capsule Take 1 capsule by mouth 2 (Two) Times a Day. 14 capsule 0   • [DISCONTINUED] cyanocobalamin 1000 MCG/ML injection INJECT 1 ML INTRAMUSCULARLY EVERY MONTH 3 mL 3   • [DISCONTINUED] potassium chloride (K-DUR,KLOR-CON) 20 MEQ CR tablet Take 40 mEq by mouth Every Night.     • [DISCONTINUED] temazepam (RESTORIL) 30 MG capsule Take 1 capsule by mouth At Night As Needed for Sleep. 30 capsule 1     No current facility-administered medications on file prior to visit.      Patient Active Problem List   Diagnosis   • Malignant neoplasm of right ovary (CMS/HCC)   • Hypomagnesemia   • Left upper extremity deep vein thrombosis (CMS/HCC)   • Pernicious anemia   • Malabsorption due to intolerance, not elsewhere classified   • MELANI (iron deficiency anemia)   • Pancytopenia due to antineoplastic chemotherapy (CMS/HCC)   • Encounter for antineoplastic chemotherapy   • Long term (current) use of anticoagulants   • Monitoring for long-term anticoagulant use   • Leg pain, bilateral   • Osteoarthritis of cervical spine without myelopathy   • Other long term (current) drug therapy   • Pain   • Hyperlipidemia   • Essential hypertension   • Antineoplastic chemotherapy induced anemia   • Cervical disc disorder with radiculopathy   • Restless leg syndrome   • Dysuria       The following portions of the patient's history were reviewed and updated as appropriate: allergies, current medications, past family history, past medical history, past social history, past surgical history and problem list.    Review of Systems   Constitutional: Negative for chills and  fever.   HENT: Negative for sinus pressure and sore throat.    Eyes: Negative for blurred vision.   Respiratory: Negative for cough and shortness of breath.    Cardiovascular: Negative for chest pain and palpitations.   Gastrointestinal: Negative for abdominal pain.   Endocrine: Negative for polyuria.   Genitourinary: Positive for difficulty urinating.   Musculoskeletal: Positive for back pain.   Skin: Negative for rash.   Neurological: Negative for dizziness and headache.   Hematological: Negative for adenopathy.   Psychiatric/Behavioral: Negative for depressed mood.       Objective   /79 (BP Location: Left arm, Patient Position: Sitting, Cuff Size: Adult)   Pulse 74   Temp 98.4 °F (36.9 °C) (Oral)   Wt 82.6 kg (182 lb)   SpO2 98%   BMI 30.29 kg/m²   Physical Exam   Constitutional: She is oriented to person, place, and time. She appears well-developed. No distress.   HENT:   Head: Normocephalic.   Eyes: Conjunctivae and lids are normal.   Neck: Trachea normal. No thyroid mass and no thyromegaly present.   Cardiovascular: Normal rate, regular rhythm and normal heart sounds.   Pulmonary/Chest: Effort normal and breath sounds normal.   Abdominal: There is CVA tenderness.   Lymphadenopathy:     She has no cervical adenopathy.   Neurological: She is alert and oriented to person, place, and time.   Skin: Skin is warm and dry.   Psychiatric: She has a normal mood and affect. Her speech is normal and behavior is normal. She is attentive.       Office Visit on 12/30/2019   Component Date Value Ref Range Status   • Color 12/30/2019 Yellow  Yellow, Straw, Dark Yellow, Radha Final   • Clarity, UA 12/30/2019 Slightly Cloudy* Clear Final   • Specific Gravity  12/30/2019 1.015  1.005 - 1.030 Final   • pH, Urine 12/30/2019 7.0  5.0 - 8.0 Final   • Leukocytes 12/30/2019 Small (1+)* Negative Final   • Nitrite, UA 12/30/2019 Negative  Negative Final   • Protein, POC 12/30/2019 Negative  Negative mg/dL Final   • Glucose, UA  12/30/2019 Negative  Negative, 1000 mg/dL (3+) mg/dL Final   • Ketones, UA 12/30/2019 Negative  Negative Final   • Urobilinogen, UA 12/30/2019 Normal  Normal Final   • Bilirubin 12/30/2019 Negative  Negative Final   • Blood, UA 12/30/2019 Trace* Negative Final   Hospital Outpatient Visit on 12/27/2019   Component Date Value Ref Range Status   • Magnesium 12/27/2019 1.3* 1.6 - 2.4 mg/dL Final   • WBC 12/27/2019 7.46  3.40 - 10.80 10*3/mm3 Final   • RBC 12/27/2019 3.04* 3.77 - 5.28 10*6/mm3 Final   • Hemoglobin 12/27/2019 10.7* 12.0 - 15.9 g/dL Final   • Hematocrit 12/27/2019 33.3* 34.0 - 46.6 % Final   • MCV 12/27/2019 109.5* 79.0 - 97.0 fL Final   • MCH 12/27/2019 35.2* 26.6 - 33.0 pg Final   • MCHC 12/27/2019 32.1  31.5 - 35.7 g/dL Final   • RDW 12/27/2019 14.6  12.3 - 15.4 % Final   • RDW-SD 12/27/2019 56.5* 37.0 - 54.0 fl Final   • MPV 12/27/2019 11.7  6.0 - 12.0 fL Final   • Platelets 12/27/2019 96* 140 - 450 10*3/mm3 Final   • Neutrophil % 12/27/2019 73.5  42.7 - 76.0 % Final   • Lymphocyte % 12/27/2019 14.9* 19.6 - 45.3 % Final   • Monocyte % 12/27/2019 11.0  5.0 - 12.0 % Final   • Eosinophil % 12/27/2019 0.5  0.3 - 6.2 % Final   • Basophil % 12/27/2019 0.1  0.0 - 1.5 % Final   • Neutrophils, Absolute 12/27/2019 5.48  1.70 - 7.00 10*3/mm3 Final   • Lymphocytes, Absolute 12/27/2019 1.11  0.70 - 3.10 10*3/mm3 Final   • Monocytes, Absolute 12/27/2019 0.82  0.10 - 0.90 10*3/mm3 Final   • Eosinophils, Absolute 12/27/2019 0.04  0.00 - 0.40 10*3/mm3 Final   • Basophils, Absolute 12/27/2019 0.01  0.00 - 0.20 10*3/mm3 Final   Lab on 12/27/2019   Component Date Value Ref Range Status   • INR 12/27/2019 1.50   Final           Assessment/Plan   Problems Addressed this Visit        Nervous and Auditory    Dysuria - Primary    Relevant Orders    POCT urinalysis dipstick, automated (Completed)    Urine Culture - Urine, Urine, Clean Catch          Tahira was seen today for urinary urgency, difficulty urinating and back  pain.    Diagnoses and all orders for this visit:    Dysuria  -     POCT urinalysis dipstick, automated  -     Cancel: Urine Culture - Urine, Urine, Clean Catch; Future  -     Urine Culture - Urine, Urine, Clean Catch    Other orders  -     nitrofurantoin, macrocrystal-monohydrate, (MACROBID) 100 MG capsule; Take 1 capsule by mouth 2 (Two) Times a Day.

## 2019-12-30 NOTE — PATIENT INSTRUCTIONS
Dysuria  Dysuria is pain or discomfort while urinating. The pain or discomfort may be felt in the part of your body that drains urine from the bladder (urethra) or in the surrounding tissue of the genitals. The pain may also be felt in the groin area, lower abdomen, or lower back. You may have to urinate frequently or have the sudden feeling that you have to urinate (urgency). Dysuria can affect both men and women, but it is more common in women.  Dysuria can be caused by many different things, including:  · Urinary tract infection.  · Kidney stones or bladder stones.  · Certain sexually transmitted infections (STIs), such as chlamydia.  · Dehydration.  · Inflammation of the tissues of the vagina.  · Use of certain medicines.  · Use of certain soaps or scented products that cause irritation.  Follow these instructions at home:  General instructions  · Watch your condition for any changes.  · Urinate often. Avoid holding urine for long periods of time.  · After a bowel movement or urination, women should cleanse from front to back, using each tissue only once.  · Urinate after sexual intercourse.  · Keep all follow-up visits as told by your health care provider. This is important.  · If you had any tests done to find the cause of dysuria, it is up to you to get your test results. Ask your health care provider, or the department that is doing the test, when your results will be ready.  Eating and drinking    · Drink enough fluid to keep your urine pale yellow.  · Avoid caffeine, tea, and alcohol. They can irritate the bladder and make dysuria worse. In men, alcohol may irritate the prostate.  Medicines  · Take over-the-counter and prescription medicines only as told by your health care provider.  · If you were prescribed an antibiotic medicine, take it as told by your health care provider. Do not stop taking the antibiotic even if you start to feel better.  Contact a health care provider if:  · You have a  fever.  · You develop pain in your back or sides.  · You have nausea or vomiting.  · You have blood in your urine.  · You are not urinating as often as you usually do.  Get help right away if:  · Your pain is severe and not relieved with medicines.  · You cannot eat or drink without vomiting.  · You are confused.  · You have a rapid heartbeat while at rest.  · You have shaking or chills.  · You feel extremely weak.  Summary  · Dysuria is pain or discomfort while urinating. Many different conditions can lead to dysuria.  · If you have dysuria, you may have to urinate frequently or have the sudden feeling that you have to urinate (urgency).  · Watch your condition for any changes. Keep all follow-up visits as told by your health care provider.  · Make sure that you urinate often and drink enough fluid to keep your urine pale yellow.  This information is not intended to replace advice given to you by your health care provider. Make sure you discuss any questions you have with your health care provider.  Document Released: 09/15/2005 Document Revised: 10/04/2018 Document Reviewed: 10/04/2018  [employer] Patient Education © 2019 [employer]

## 2020-01-02 LAB — BACTERIA SPEC AEROBE CULT: ABNORMAL

## 2020-01-02 RX ORDER — SYRINGE W-NEEDLE,DISPOSAB,3 ML 25GX5/8"
SYRINGE, EMPTY DISPOSABLE MISCELLANEOUS
Qty: 1 EACH | Refills: 3 | Status: SHIPPED | OUTPATIENT
Start: 2020-01-02 | End: 2020-09-30

## 2020-01-02 RX ORDER — TRIAMTERENE AND HYDROCHLOROTHIAZIDE 37.5; 25 MG/1; MG/1
CAPSULE ORAL
Qty: 90 CAPSULE | Refills: 1 | Status: SHIPPED | OUTPATIENT
Start: 2020-01-02 | End: 2020-06-22

## 2020-01-02 NOTE — PROGRESS NOTES
Hematology/Oncology Outpatient Follow Up    PATIENT NAME:Tahira Zimmerman  :1955  MRN: [id number]  PRIMARY CARE PHYSICIAN: Noemy Queen MD  REFERRING PHYSICIAN: Noemy Queen MD    Chief Complaint   Patient presents with   • Follow-up     Malignant neoplasm of right ovary        HISTORY OF PRESENT ILLNESS:   1. Recurrent ovarian cancer diagnosis established in 2013.  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  The patient was treated with surgery and then postoperatively with adjuvant chemotherapy consisting of Carboplatin and Taxol.  Six months later, she had recurrence of disease intra-abdominally between the rectum and vagina, which was treated with intraabdominal peritoneal chemotherapy.  She had been free of disease since that time.  She reported feeling a mass in the left side of her abdomen approximately six months ago.  This gradually grew and in early 2013 she had her daughter feel the mass.  The daughter works for Dr. David Rogers and the patient at that time also complained of intermittent rectal bleeding.  Consultation with Dr. David Rogers was obtained.  The patient had a CT scan of the abdomen and pelvis performed on 13 revealing left lower quadrant mass measuring 9.7 x 9.3 x 6 cm demonstrating areas of dense calcification, soft tissue density and cystic density within it.  Stromal tumor is felt to be most likely a possibly fibroma.  It is generated with necrosis and calcification.  Possible palpable mass in the rectus muscle is seen with calcification and deformity of the rectus muscle and just below this a second mass intra-abdominally was seen measuring 2 cm in the greatest diameter suspicious for second intraabdominal tumor.  The mass separate from the largest mass measuring 2.6 cm in the dependent portion of pelvis is seen on the right of the midline.  No  retroperitoneal lymphadenopathy was seen.  No free air, free fluid or other abnormality was present.  The patient was scheduled for an outpatient colonoscopy, but had syncopal episode while sitting in the toilet the night before and was brought to the emergency room early in the morning by her daughter and son-in-law.  The patient had apparently stopped breathing for a few seconds and did not have a pulse, but started breathing before CPR could be initiated.  In the emergency room, the patient had an EKG revealing sinus rhythm nonspecific T-wave flattening; metabolic panel revealed a glucose of 148, creatinine of 1.3, CBC was normal.  She was treated with intravenous fluid.  The patient’s case was then discussed with Dr. Anabell Monique and patient was subsequently admitted to [facility].  The patient underwent a colonoscopy by Dr. David Rogers on 06/27/13 revealing sigmoid diverticulosis and grade II internal and external hemorrhoids, but no mass or colitis was seen.  CT-guided biopsy of the mass was performed on 06/27/13 as well revealing metastatic papillary adenocarcinoma with numerous psammoma bodies.  Pathology comment stated prior records were not available; however, with history of ovarian cancer the current biopsy would be consistent with metastases from that malignancy.  Oncology consult was obtained and I initially saw the patient on 06/27/2013 where the patient had claimed to have little bit of pain in the area of disease.  She reported being frequently constipated, denies any weight loss or fevers.  She did report having some night sweats, but thought that was because of her menopause.  On 06/27/13 metastatic workup including labs and CT scans were initiated.  CT scan of the chest on 06/27/13 revealed no acute disease in the chest.  A 1.4 cm size focal area of decreased density was noted in the lateral aspect of the right lobe of the liver consistent with a benign cyst or hemangioma.   There was a very small hiatal hernia measuring 2.2 cm in diameter.  A CT scan of the head performed 06/27/13 revealed no acute intracranial abnormality noted.  CA-125 was 40 units/ml (0-35), CA 19-9 was 11.8 units/ml (0-35), CEA level was 0.8 ng/ml (0-3), TSH level was 1.09 IU/ml (0.34-5.6).  The patient was in workup for the syncopal episode, a carotid Doppler was performed on June 28 revealing an essentially normal study showing a reduction in diameter from 0-15% bilaterally.  A Holter monitor was completed on 06/27/13, which was unremarkable except for a few premature atrial contractions.  The patient was felt stable and subsequently was discharged home on 06/28/13.  Post discharge, the patient was seen at [facility] on 07/05/13 by Dr. Kathryn Thrasher who discussed treatment options with the patient including surgery.  The patient is in the office today 07/16/13 in follow up post hospitalization.  She is reporting that surgery is planned for July 30th of this month at .  · 7/30/13 to 8/3/13 - The patient was in [facility].  The patient was admitted for surgery for her recurrent ovarian cancer.  The patient had exploratory laparotomy, omentectomy, bowel resection, liver biopsy and appendectomy.  Pathology revealed of the omentum metastatic serous carcinoma of the transverse colon, segmental resection showed metastatic serous carcinoma involving mesenteric fat.  The liver wedge resection showed fibrosclerotic thickening of the hepatic capsule.  Benign liver parenchyma.  No evidence of metastatic tumor.  Appendix showed fibrous obliteration of the lumen.  Negative for metastatic carcinoma.  Rectum and sigmoid colon segmental resection showed metastatic serous carcinoma invading the colorectal mucosa uninvolved by tumor.  Vaginal mass showed metastatic serous carcinoma.  Margins are positive for tumor.  · 9/24/13 - Chemotherapy orders were written for patient to start  carboplatin 550 mg IV day one and Taxol 330 mg IV day one to be cycled every three weeks.  · 10/10/13 - The patient started cycle #1 of chemotherapy consisting of Carboplatin and Taxol.  · 09/25/13 -  is 10.6 normal.  · 10/01/13 - CT of the chest, abdomen and pelvis revealed new reticular nodular occupancy in the anterior segment of the right upper lobe, could reflect an inflammation or infectious etiology or could reflect unusual persistence of metastatic tumor.  New liver lesions, the smaller one appears to be implant on the surface of the liver, the larger may also represent an implant including the intrahepatic.  Several small mesenteric nodes seen throughout the abdomen and pelvis.  · 10/02/13 - Port placed by Dr. Sen.  · 10/24/13 - Orders written to start Neupogen daily and if more than three Neupogen are needed to give Neulasta after next chemo.  ANC is 0.15.  · 10/31/14 - Patient given cycle 2 of chemotherapy with Taxol and Carboplatin.  · 11/21/13 - The patient started cycle 3 of chemotherapy consisting of Carboplatin and Taxol.  · 11/26/13 - CT scan of chest, abdomen and pelvis revealed no evidence of active disease in the chest.  Reticulonodular opacity has resolved.  Metastatic lesion impressing upon the hepatic dome similar to prior exam.  No evidence of a change.  No evidence of new disease.  Postsurgical changes in the pelvis and throughout the retroperitoneum in the large bowel.  Finding containing anterior abdominal wall hernia containing fat tissue and enhancing vessels, 3 cm in diameter.  · 12/2/13 - Orders written to start Neupogen per protocol as well as Aranesp due to low hemoglobin and ANC.  ANC is 0.14.  Hemoglobin is 9.7.  · 12/11/13 - Orders written to hold chemotherapy until next week and decrease dose by 20% due to low platelet count.  Platelet count is 85,000.  · 12/16/13 - The patient received cycle 4 of Carboplatin and Taxol at a 20% dose reduction so Taxol dose is 260 mg and  Carboplatin is 440 mg.  · 12/16/13 - CA-125 12.6 (N).  · 12/19/13 - PET/CT scan.  Impression:  1. There is no evidence of hypermetabolism in the liver.  Specifically of the dominant lesion in or adjacent to the right hepatic dome that was seen and described on the recent CT study of 11/26/2013.  It is photopenic relative to the surrounding liver.  2.  There is no evidence of hypermetabolic soft issue mass lesion or free fluid in the abdomen or pelvis.  3.  The tonsillar tissues are slightly enlarged and have moderate activity level.  This maybe physiologic.  Correlation with oral cavity, examination is recommended.  4.  Mild amount of activity in the right level II jugular lymph chain without focally enlarged lymph nodes is of questionable significance.  · 1/6/13 - The patient received cycle 5 of chemotherapy with Taxol and Carboplatin.  · 1/27/14 - The patient started on cycle 6 of Taxol and Carboplatin.  · 2/18/14 - CT of the abdomen and pelvis with contrast; stable lesions impressing upon the hepatic dome, unchanged compared to the prior exam.  Multiple anterior abdominal wall hernias re-demonstrated.  Those above the umbilicus contain omental fat.  Below and to the left of the umbilicus as anterior abdominal hernia containing omental fat in nondilated small bowel which is larger than seen previously.  · 2/20/14 - The patient received cycle 7 of chemotherapy with Taxol and Carboplatin.   · 3/11/14 - Order written to discontinue chemotherapy due to patient has completed 7 cycles of chemotherapy and CT scans suggest possible remission.  · 3/11/14 -  11.0 (N).  · 06/05/14 - CT scan of the chest abdomen and pelvis with contrast: There are multiple anterior abdominal wall hernias.  There are 2 larger anterior abdominal wall hernias at the level of the transverse colon, which contain omental fat.  There are at least 2 small anterior abdominal wall hernias that contain omental fat.  There is a moderate sized  anterior abdominal wall hernia at the level of the umbilicus, eccentric to the left, which contain non-dilated bowel.  Interval decrease in the size of two deposit impressing on the liver.  The third tiny deposit has been stable.  · 8/19/14 -  10.4 (N).  · 12/19/14 -   10.0 (N)  · 1/7/15 - CT chest, abdomen and pelvis with stable appearance of the chest with several micronodules. Small hiatal hernia, slightly larger than previous exam, increased from 1.5 to 2.5 cm in diameter. Bochdalek hernia unchanged. Multiple hepatic lesions. The two dominant lesions at the right hepatic dome had decreased in size from previous. The remaining tiny nodules measured 3-5 mm in diameter and were unchanged. There was no evidence of new intra-abdominal or pelvic mass or free fluid. There were multiple anterior abdominal wall hernias which had increased in size.   · 7/27/15 - Comprehensive metabolic panel with no significant abnormalities. CA-125 of 21 (0-35).   · 10/26/15 - WBC 6, hemoglobin 13, platelet count 204,000. Heart rate 47. CA-125 of 20 (0-35).    · 2/18/16 - CT chest with contrast with stable micronodular density seen in the lungs without significant change from prior exam. CT abdomen and pelvis with regression of liver lesions with no new evidence of metastasis. Multiple ventral hernias again noted without significant change from prior exam. Creatinine 0.9 (0.6-1.3).    · 2/25/16 - Patient hospitalized at [facility] between 2/25/16 and 2/27/16 with pyelonephritis secondary to E-coli. She was given two days of IV Rocephin and then placed on oral Levaquin. She was asked to hold her Coumadin, but then had nausea and vomiting because of Levaquin and stopped the Levaquin also. Her CA-125 was 32 (0-35) and CEA 1.3 (0-5). Her urine grew >100,000 E-coli. CT of the abdomen and pelvis was done which revealed 4.1 x 1.8 cm sized mild ovoid area of decreased density in the liver parenchyma along the anteromedial  aspect of the dome of the right lobe of the liver, unchanged significantly since the previous study of 2/18/16. There was suspicion of a very small pericardial effusion. There was suspicion of a small hiatal hernia. There were several hernias in the anterior abdominal wall. A 3.5 x 3.1 cm incised well-circumscribed abnormal density in the left retroperitoneal fatty soft tissue at the level of the left iliac crest was suggestive of tumor recurrence. There was an abnormal density in the left retroperitoneal soft tissues in the left flank that partially surrounded the left kidney and extended downward to the left pelvic area. Diverticulosis of the distal descending colon and sigmoid colon were seen.     · 3/8/16 - Patient prescribed Bactrim DS 1 p.o. b.i.d. for 10 days for pyelonephritis, which was partially treated with Rocephin and Levaquin. Urine with 40,000 E-coli treated with Macrobid for 7 days.  of 20 (0-35).    · 3/31/16 - PET scan with area of low density anteriorly in the right lobe of the liver with no significant radiopharmaceutical uptake to suggest malignancy. Multiple round soft tissue density masses showing increased radiopharmaceutical activity and multiple anterior abdominal wall hernias.   · 5/10/16 - Patient complains of venous enlargement of the left chest wall around the port. Has not been seen by  yet, but has an appointment on 5/23/16. Complained of a cough.   · 5/12/16 - Infusaport contrast study with no evidence of fibrin sheath. Chest x-ray with mild cardiac prominence.   · 5/23/16 - Patient seen in consultation by Sheng Bowman M.D. at [facility] and a chemotherapy trial recommended.   · 6/1/16 -   of 27.1 (0-35).    · 6/14/16 - WBC 5.71, hemoglobin 12.4, platelet count 188,000. Patient is scheduled for surgery at  on 6/16/16 after further discussion with  physicians. Discussed adjuvant chemotherapy.   · 6/16/16 - Excisional biopsy of abdominal wall mass  performed by Sheng Bowman M.D. at [facility] with pathology revealing metastatic high-grade papillary serous carcinoma.   · 7/7/16 - Received a call from the patient that Tramadol was not working and that she was given Norco #24 after her biopsy at  which did help her pain. Patient prescribed Norco 5/325 one p.o. q. 4-6 hours p.r.n. #60 with no refills. Echocardiogram with left ventricular inferior wall hypokinesis with ejection fraction of 50-55%.   · 7/8/16 - Patient seen in consultation by Sheng Bowman M.D. in consultation at  for metastatic high-grade papillary serous carcinoma diagnosed by excisional biopsy on 6/16/16 with carcinomatosis and patient not a surgical resection candidate. Genetic sequencing and susceptibility testing of tumor was ordered. Biopsy was done of anterior abdominal wall.   · 7/7/16 - Echocardiogram with left atrial enlargement. Mild mitral regurgitation. Left ventricular proximal inferior wall hypokinesis with ejection fraction of 50-55%.   · 7/11/16 - While waiting for genomics results, in order not to delay treatment further, order written for Taxotere 60 mg/M2 IV over one hour followed by Carboplatin AUC 5 over one hour, cycles to be repeated every three weeks.  Comprehensive metabolic panel normal.  26 (0-35).    · 725/16 - Pain contract signed for use of Norco.   · 8/5/16 - Patient stated that she experienced a red rash and was itchy post taking Norco. Norco discontinued and Tramadol refilled.   · 8/16/16 - Patient started cycle 1 chemotherapy. WBC 7.1, hemoglobin 11.2, MCV 88.7, platelet count 94,000. Patient complained of nausea for a week post chemo. Already getting Emend, Aloxi and Decadron. Added Omeprazole 40 mg daily orally.   · 8/17/16 - Urine culture with >100,000 E-coli.   · 8/25/16 - Cycle 2 chemotherapy given.   · 9/9/16 - Magnesium 1.3, replaced intravenously. Potassium 3.4 (3.6-5.1), increased oral potassium supplementation.    · 9/16/16 -  Cycle 3 chemotherapy given.   · 9/19/16 - Comprehensive metabolic panel with no significant abnormality.   · 9/20/16 - CT abdomen and pelvis with evidence of progressive metastatic disease in the abdomen and pelvis with interval enlargement of several soft tissue attenuations and subcutaneous nodules in the anterior abdominal wall. Interval enlargement of a left pelvic soft tissue attenuation mass. A lentiform area of low attenuation in the dome of the liver stable in size. Multiple anterior abdominal wall hernias containing fat and/or bowel. Marked pelvic floor laxity. CT chest negative for evidence of metastatic disease. Tiny pulmonary nodules in the right lung stable since 2013 consistent with a benign etiology.   · 9/23/16 - Macrobid 100 mg p.o. b.i.d. x7 days prescribed for UTI. Current chemotherapy discontinued after discussion and new chemotherapy orders written. Carboplatin AUC-5 IV day 1 and Doxil (Liposomal Doxorubicin) 30 mg/M2 IV day 1 to cycle q. 21 days.  of 18 (0-35). Magnesium 1.6, replaced intravenously. Comprehensive metabolic panel with potassium 3.4 (3.6-5.1).     · 10/13/16 - Patient started on cycle 1 of Carboplatin and Liposomal Doxorubicin followed by Neulasta.   · 11/3/16 - Cycle 2 Carbo and Doxil given.   · 11/17/16 - Magnesium 1.0, replaced intravenously. Oral potassium supplement increased.   · 11/29/16 - CT chest with small non-calcified right pulmonary nodules unchanged. Benign bone island in the midthoracic vertebral body along with benign bony hemangiomas in the middle thoracic vertebral bodies. CT abdomen and pelvis with mild progressive metastatic disease from CT of September 2016. Anterior abdominal wall mass superiorly mildly increased in size with new area noted as described measuring 3 x 1.7 cm. 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 Stable area of decreased uptake in the dome of the liver not hypermetabolic on recent PET  scan, likely representing cyst or focal fatty infiltration. Stable small hiatal hernia, fat-containing Bochdalek’s hernia and anterior abdominal wall hernias with stable pelvic floor relaxation.    · 12/1/16 - Cycle 3 chemotherapy given.   · 12/8/16 - Current chemotherapy discontinued and patient started on Gemcitabine 1000 mg/M2 IV days 1, 8, 15 q. 28 days.   · 12/22/16 - Patient seen in followup by Juliana Roy M.D. at the pain clinic, treated with Oxycodone and Lyrica. Transaminases normal. Patient started cycle 1 chemotherapy.   · 12/29/16 - Magnesium 1.1 (1.8-2.5), replaced intravenously.   · 1/5/17 - Cycle 1 day 15 chemotherapy held as patient leaving for vacation to [place]. Chemotherapy dose decreased by 20%. Patient complains of diarrhea for which Imodium prescribed.   · 1/11/17 - BRCA-1 and BRCA-2 negative.   · 1/12/17 - Echocardiogram with ejection fraction 60% or greater.   · 1/19/17 - Comprehensive metabolic panel with no significant abnormality. Patient started cycle 2 chemotherapy.   · 2/2/17 - Urine with >100,000 E-coli treated with Cipro 500 mg p.o. b.i.d. x1 week.   · 2/6/17 - Magnesium 1.1 (1.8-2.5), replaced intravenously.    · 2/23/17 - Patient started cycle 3 chemotherapy.   · 2/27/17 - CT chest with interval development of too numerous to count very small pulmonary nodules, ill-defined ground glass opacities concerning for development of pulmonary metastatic disease. Stable left-sided chest port. CT abdomen and pelvis with stable appearance of multiple small soft tissue densities within the abdomen near midline subcutaneous fat of the abdominal wall. Interval decrease in size of largely calcified left pelvic mass and multiple fat in bowel containing small ventral hernias.   · 3/6/17 - Pulmonary consultation obtained for lung nodules. Discussed CT results with patient. Decision made to continue present chemotherapy until further lung evaluation as abdominal disease better.  of 18  (0-35).    · 3/16/17 - Patient started cycle 4 chemotherapy, but treatment held from day 8 onwards because of hospitalization.   · 3/19/17 - Patient was hospitalized at [facility] between 3/19/17 and 3/25/17 with chest pain. Chest x-ray had revealed increased mild bibasilar airspace disease. Troponin I and EKG’s were negative. INR was elevated. Patient was treated with antibiotics. EGD was performed revealing small hiatal hernia and esophageal dilatation was performed. Bronchoscopy was performed also with no intrabronchial lesions identified. IgA was 51 (), IgG 320 (600-1500) and IgM 19 (). Lexiscan nuclear stress test had no evidence of reversible myocardial ischemia with normal left ventricular ejection fraction of 58%. CT chest had development of patchy bilateral ground glass opacities most pronounced involving the left upper lobe and bilateral lower lobes. Multiple tiny bilateral pulmonary nodules were less conspicuous. The patient underwent a bronchoscopy by Jerica Esparza M.D. with right upper lobe bronchoalveolar lavage revealing benign squamous cells and bronchial cells with pulmonary macrophages present. There was mild acute inflammation. Negative for malignant cells. Prilosec was changed to Famotidine for hypomagnesemia.    · 4/6/17 - Magnesium 1.2 (1.8-2.5). Comprehensive metabolic panel with no significant abnormality. Patient seen in follow-up by Fito Ram M.D. at [facility]. Treated with Lyrica and Oxycodone.    · 5/4/17 - Patient complains of a rash itching on her right upper arm. Eczematous rash noted and patient prescribed Cyclocort 0.1% b.i.d. Magnesium infusions continued with each chemo. Order written to resume chemotherapy.   · 5/16/17 - Cycle 5 chemotherapy started.   · 5/26/17 - Magnesium 1.4 (1.8-2.5), replaced intravenously. Potassium 2.9 (3.6-5.1), KCL increased to 20 mEq three in the morning and three in the evenings.   · 5/30/17 - PET scan with remaining metabolic  activity within the nodular areas. The suggested mass which is partially calcified and necrotic within the left aspect of the pelvis seems slightly larger with increased metabolic activity. There was more calcification in these areas compared to prior study, suggesting inflammation.      · 6/8/17 - Patient complaining of shortness of breath. Does take HCTZ at home. Chest x-ray ordered and chemotherapy continued along with weekly magnesium replacement. Chest x-ray with no acute cardiopulmonary abnormalities.   · 6/13/17 - Patient received cycle 6 of chemotherapy.   · 7/31/17 - WBC 5.0, hemoglobin 11.2, MCV 89.7, platelet count 217,000. Patient is to resume chemotherapy starting with cycle 7 on Wednesday of this week.  of 19 (<35). Potassium 3.3 (3.5-5.3).     · 8/2/17 - Patient began cycle 7 of chemotherapy.   · 8/30/17 - Patient started cycle 8 chemotherapy.   · 9/5/17 - Patient seen in followup at Pain Management by Fito Ram M.D. and continued on treatment with Oxycodone and Lyrica.   · 9/13/17 - Patient was hospitalized at [facility] for a day with dizziness secondary to dehydration, hypokalemia and anemia. She was given 1 unit of packed red blood cells and electrolytes were replaced. Chest x-ray revealed a subtle opacity in the left lateral lung base favored to represent atelectasis. Magnesium 1.3 (1.5-2.5), patient continued on replacement.    · 9/22/17 - Chemotherapy and magnesium replacement continued with decrease in chemotherapy dose by 10% secondary to cytopenias.   · 9/25/17 - Cycle 9 chemotherapy started.   · 10/12/17 - CT of the chest with contrast showed several tiny pulmonary nodules. One within the right lower lobe appears new from prior exams. Two adjacent foci of nodularity within the medial right lower lobe suggestive of minor infectious or inflammatory process. CT of the abdomen and pelvis with contrast which showed soft tissue nodules along the anterior abdominal and pelvic walls  unchanged from previous scan. Also a partially calcified mass within the left pelvis unchanged. No acute process identified within the abdomen or pelvis. Several left paracentral ventral hernias unchanged. No evidence of acute bowel obstruction.   · 10/16/17 - Order written for Levaquin 500 mg p.o. daily as the patient states that she has had a productive cough, fever and chills and with the results of the CT scan showing a possible infectious versus inflammatory process. Order also written to continue chemotherapy and magnesium replacement as previously prescribed.   · 10/23/17 - Cycle 10 chemotherapy started.   · 11/19/17 - Patient went to the Emergency Room at [facility] complaining of right lower extremity redness and fever for a day. Venous Doppler had no evidence of a DVT and patient was discharged home on Clindamycin.   · 11/21/17 -  of 17 (0-35).   · 12/4/17 - Cycle 11 chemotherapy started.   · 12/20/17 - PET scan with multiple hypermetabolic soft tissue nodules abutting the anterior abdominal wall, stable from previous examination. Peripherally calcified left lower quadrant mass near the ascending colon stable in size demonstrating no hypermetabolic uptake. Previously had maximum SUV of 7. Right medial basilar pulmonary nodules resolved.   · 12/22/17 - CT left lower extremity with soft tissue swelling with skin thickening present along the anterior and medial aspect of the leg, slightly more pronounced around the palpable marker seen within the upper medial aspect of the lower extremity.   · 12/26/17 - Elastic stockings prescribed for lower extremity edema.   · 1/8/18 - Patient hospitalized at [facility] between 1/8/18 and 1/11/18 with fever of up to 101 degrees and painful rash on the lower extremities of several weeks’ duration. She was found to be hypokalemic and MRI of the lower extremities revealed thickening and swelling. Chest x-ray had no acute cardiopulmonary abnormality. Skin biopsy was performed by  Surgery revealing stasis dermatitis and no evidence of malignancy. Infectious Disease consultation was obtained and IV antibiotics were stopped. Blood cultures had no growth.   · 1/22/18 - Patient asked to start wearing elastic stockings which she has not started yet. She was given a prescription for Dyazide 1 p.o. daily.   · 2/26/18 - Ordered chemo to resume again. Patient unaware that she was supposed to resume chemo after her last visit in January. Continue magnesium infusions on day 1, 8 and 15. Prescribed Levaquin 500 mg p.o. daily x10 days for productive cough x1 week. History of viral illness consistent with influenza.  of 18 (0-35). Chest x-ray with no acute process.    · 3/5/18 - Cycle 12 chemotherapy started.   · 3/26/18 - Options discussed with patient. Decision made to discontinue IV Gemzar and orders written to start on Niraparib (Zejula) 300 mg p.o. daily as maintenance therapy.   · 4/11/18 - Niraparib discontinued due to insurance denial. Orders written to start Carboplatin-AUC 5 IV day 1 cycling every 21 days. Orders written for Neulasta 6 mg subcutaneous kit day 1 with palliative treatment intent and expected duration of treatment three months.   · 4/12/18 - CT chest, abdomen and pelvis with contrast revealed numerous tiny centrilobular nodules in the lungs favored to reflect infectious or inflammatory process. Nodules ranged 1-3 mm in size and are new from prior studies with metastatic disease not entirely excluded. Stable small hiatal hernia. Interval progression of metastatic disease involving the subcutaneous tissues at the ventral abdominal wall. Multiple soft tissue density nodules previously shown to be hypermetabolic on PET scan. New small crescent of low attenuation material along the periphery of the spleen with similar finding at the dome of the liver. Findings are concerning for peritoneal metastases. Density of the dome of the liver remained unchanged from multiple prior studies.  Stable calcified mass in the left pelvic sidewall. Urinary bladder wall thickening.   · 4/23/18 - Cycle 1 chemotherapy with Carboplatin administered along with Neulasta injection.   · 4/26/18 - Neulasta discontinued due to insurance denial.   · 5/14/18 - Patient received cycle 2 Carboplatin.   · 6/5/18 - Cycle 3 day 1 chemotherapy with Carboplatin administered.   · 6/20/18 - CT chest, abdomen and pelvis at [facility] showed re-demonstration of soft tissue mass in the ventral wall hernia left of the midline as well as the dome of the liver, likely representing metastatic disease similar as compared to the prior study. Additional calcified lesions present in the upper left hemipelvis and along the anterior abdominal wall to the right of the midline also likely representing metastatic disease. No definite new lesions were identified. Moderate to large stool burden likely related to mild constipation was present. No suspicious lung nodules were identified. The previously-described ground glass nodules appeared to have resolved. There was a stable subpleural nodule in the right upper lobe measuring 3 mm present since 2014 without significant changes.   · 6/26/18 - Cycle 4 day 1 Carboplatin administered with addition of Neulasta for febrile neutropenia prophylaxis.   · 6/29/18 - Reviewed CT scans with patient. Orders written to discontinue Carboplatin and Neulasta and plan to start Niraparib 300 mg by mouth daily as maintenance therapy. Prescribed Amlodipine 2.5 mg p.o. daily for chemo-induced hypertension.   · 7/18/18 - Orders written to increase weekly Magnesium Sulfate to 3 g IV weekly due to continued hypomagnesemia.   · 8/1/18 - Patient reports she has not received Niraparib (Zejula) to date.   · 8/30/18 - Patient’s phone was not working so though Niraparib approved, the drug company had not been able to get ahold of her. Patient supplied with drug company number so that she can start taking the pills.    · 9/6/18 -  of 20 (N).   · 9/18/18 - Urinalysis done for dysuria and back pain. Urine culture grew 20,000 to 50,000 colonies of urogenital ruy. Prescribed Bactrim DS one tablet twice daily for one week.   · September 2018 - Patient initiated on Zejula 300 mg p.o. daily.   · 10/1/18 - WBC 3.5, hemoglobin 12, platelet count 74,000. Niraparib (Zejula) dose held and then resumed when platelets above 100,000 at a dose of 200 mg daily.   · 10/15/18 - Patient received Niraparib (Zejula) at 200 mg p.o. daily with a platelet count of 198,000.   · 11/7/18 - WBC 6, hemoglobin 11.6, MCV 96.2, platelet count 137,000. Patient claims to have stopped taking Niraparib a few days prior because of cough. Asked to resume taking it. Ciprofloxacin 500 mg p.o. twice daily for one week for bronchitis.   · 12/3/18 - Magnesium Sulfate infusions changed to every two weeks, to send mag level before and give 3 grams. DC’d Magnesium Oxide and started Magnesium Plus Protein two pills twice daily.   · 1/4/19 - CT chest, abdomen and pelvis with new patchy nodular areas of airspace consolidation within the bilateral upper lobes, left greater than right, favored to be infectious or inflammatory. Interval enlargement of the peritoneal soft tissue masses within the pelvis compatible with progression of disease. Enlarging ventral hernia now containing multiple loops of small bowel and colon.   · 1/7/19 - Patient has not been coming in for weekly magnesium replacement as nursing has not been able to get ahold of her. Results of CT’s discussed with patient. Decision made to change her to IV chemotherapy with Carboplatin AUC-5 day 1 and pegylated liposomal Doxorubicin (Doxil) 30 mg/M2 IV day 1 to cycle every four weeks. Patient made aware of the limited choices of her treatment available.   · 1/31/19 - Echocardiogram showed LVEF 50-55% and otherwise normal echo Doppler study.   · 2/4/19 - New chemotherapy not initiated to date with difficulty  contacting patient for echocardiogram. Neulasta On-Pro 6 mg ordered with chemotherapy due to extensive prior exposure to chemotherapy, increasing risk of febrile neutropenia, history of neutropenic events on prior chemotherapy regimens.   · 2/15/19 - Patient started cycle 1 chemotherapy with Neulasta support.   · 3/6/19 -  of 28 (0-35).   · 3/15/19 - Cycle 2 Carboplatin with Liposomal Doxorubicin (Doxil) and Neulasta support initiated.     · 4/10/19 - Patient was requested to followup with Dr. Queen regarding hypotension and blood pressure medication dosing. Patient appears to be tolerating treatment well with cytopenias not requiring dose adjustments. No Doxil-related skin or mucus membrane toxicities or other significant toxicities to date.   · 4/12/19 - Cycle 3 chemotherapy given.   · 4/16/19 - CT chest, abdomen and pelvis with no evidence of active metastasis to the chest. Several tree-in-bud nodules within the periphery of the inferior right upper lobe likely infectious or inflammatory. Disease burden within abdomen and pelvis stable to slightly increased from prior CT. Specifically, a soft tissue mass along the right rectus muscle slightly increased in bulk. Multiple ventral hernias, some containing loops of bowel, with no evidence of acute bowel obstruction.   · 5/8/19 - Discussed CT results with patient. Overall stable disease and would like to continue same treatment. Dove soap and Hydrocortisone Cream p.r.n. prescribed for scattered rash on back.   · 5/10/19 - Cycle 4 Carboplatin and Liposomal Doxorubicin initiated.   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 It was ER positive, HER2/zuleima negative, PD-L1 negative. There was TOPO1 positivity and TUBB3 positivity. TMB was low (two mutations per megabyte MUTS/MB) and MSI was stable. There were 13 therapies considered with increased benefit. The 13 therapies with increased benefit  included Fulvestrant, Irinotecan, Letrozole, Topotecan, Anastrazole, Exemestane, Tamoxifen, all of which were referenced to [facility] as well as Abemaciclib, Everolimus, Gefitinib, Palbociclib, Ribociclib and Toremifene.     · 6/5/19 echocardiogram with preserved LV systolic function with EF around 60%.  · 6/7/19 cycle 5 chemotherapy with Neulasta support given.  Patient continued on mag oxide 400 mg p.o. twice daily and weekly IV 3 g mag sulfate infusions for persistent hypomagnesemia.  · 7/5/2019- cycle 6 chemotherapy with carboplatin and doxorubicin with Neulasta port initiated.  Ca125:  21 (0-35).  · 7/23/2019-CT chest abdomen and pelvis compared to CT from 4/16/2019 revealed multiple small pulmonary nodules unchanged from prior examination within the right upper and left lower lobes.  Complex ventral hernia similar to prior exam.  Soft tissue nodule along the right lateral margin of the right of the midline measuring 12 x 10 mm unchanged in size.  Irregular soft tissue nodular thickening along the margin of the most inferior aspect of the complex ventral hernia with thickness measuring up to 13 mm similar to prior examination.  Extensive calcified and soft tissue mass within the left lower quadrant decrease in size now measuring 2.6 x 2.3 cm previously 3.0 x 2.5 cm.  Partially calcified mass involving the right rectus sheath measuring 3.1 x 2.7 cm previously measured 3.3 x 2.9 cm.  Densely calcified mass measuring 2.1 x 1.6 cm unchanged previously measured 2 x 1.7 cm.  Right external iliac chain lymph node measuring 9 mm previously measured 5 mm.  · 8/2/2019 cycle 7 chemotherapy given.  · 8/30/2019-cycle 8 chemotherapy with carboplatin and doxorubicin with Neulasta support given.  · 9/27/2019 cycle 9 chemotherapy given.  · 10/1/19 - Echocardiogram showed Normal LV size and contractility EF of 60-65%. Normal RV size. Borderline left atrial enlargement. Aortic valve, mitral valve, tricuspid valve appears structurally  normal, no significant regurgitation seen.No pericardial effusion seen. Proximal aorta appears normal in size.  · 10/18/19 - Magnesium 1.5 (1.8-2.5).  IV magnesium replacement continued.  · 10/25/2019 cycle 10 chemotherapy given.  · 10/29/2019 patient had CT scan of the chest abdomen and pelvis-there is a new 2 mm nodule in the left lower lobe with a stable 2 mm nodule in the left lower lobe.  Follow-up in 6 months was recommended.  Stable multiple soft tissue density and calcified nodules within the ventral abdominal wall and left retroperitoneal fat consistent with nonviable metastatic disease.  These nodules have either decreased in size or stable.  · 11/22/2019 10 received cycle 11 of chemotherapy with Doxil and carboplatinum with Neulasta  · 12/8/2019 to 12/11/2019 patient was admitted to the hospital for neutropenic fever.  · 12/20/2019 patient received cycle 12 of chemotherapy with Doxil and carboplatinum with Neulasta     2. Deep vein thrombosis of left upper extremity diagnosis established in October of 2013.  · 10/16/13 - Venous Doppler of left upper extremity.  Impression:  Deep vein thrombosis involving the subclavian, axillary and brachial veins on the left side, superficial vein thrombosis involving the left basilic vein.  · 10/16/13 - Order written for Lovenox 120 mg subcutaneously daily until INR is in therapeutic range.  Order written for Coumadin 5 mg p.o. daily.  The patient is to follow PT and INR protocol.  · 5/20/16 - Venous Doppler bilateral lower extremities normal.  · 7/30/19 - Patient continued on Coumadin following the INR protocol.      3. Anemia diagnosis established in November 2013 and pernicious anemia diagnosis established March 2016.   · 11/15/13 - Hemoglobin is 7.8.  · 12/2/13 - Orders written to start Aranesp 200 mg subcutaneous every two weeks due to low hemoglobin secondary to chemo induced anemia.  Hemoglobin is 9.7.  Anemia workup is ordered.  · 12/03/13 - Ferritin 179.8 (N),  folic acid 8.3 (N), vitamin B12 314, iron 104 (N), TIBC 298 (N), iron saturation 35 (N), Reticulocyte count 0.88 (N).  EPO level 124 (N).  Haptoglobin 22 (H).  · 10/22/14 - Hemoglobin 12.5, hematocrit 37.3, MCV 91.6.  · 10/23/14 - Anemia resolved.   · 3/8/16 - WBC 5.8, hemoglobin 11.7, MCV 90.3, platelet count 245,000. Vitamin B12 of 189 (180-914), ferritin 61 (), iron 91 (), TIBC 345 (228-428).   · 3/15/16 -  ().        · 3/22/16 - Intrinsic factor antibody positive, antiparietal antibody negative. Vitamin B12 at 1000 mcg IM weekly started, but the patient after receiving first dose on 3/22/16 did not come back for injections until 4/13/16.   · 4/13/16 - WBC 5.1, hemoglobin 12.5, MCV 87.9, platelet count 201,000. Patient given B12 injection and then continued at home.   · 5/10/16 - WBC 5.1, hemoglobin 12.5, platelet count 201,000.   · 6/14/16 - Monthly Vitamin B12 injections continued at home.   · 7/11/16 - WBC 5.48, hemoglobin 12.7, MCV 86.2, platelet count 200,000.   · 8/16/16 - Patient continued on monthly Vitamin B12 injections at home.   · 1/5/17 - WBC 2.6 with 38% neutrophils, 56% lymphocytes, 5% monocytes, hemoglobin 10.5, .3, platelet count 63,000. Patient continued on Vitamin B12 injections 1000 mcg IM monthly at home.   · 3/20/17 - Retic 0.41 (0.5-1.5), creatinine 1.0 (0.4-1.0). Iron 12 (), TIBC 270 (228-428), ferritin 259 (), haptoglobin 340 (), TSH 0.43 (0.34-5.6), folate 6.0 (5.9-24.8). Serum protein electrophoresis revealed decreased gammaglobulins. Serum EDU had no monoclonal gammopathy identified. Stool Hemoccult was negative.   · 6/8/17 - WBC 6.3, hemoglobin 8.3, MCV 92.4, platelet count 50,000. Procrit 40,000 units subq weekly added for chemotherapy-induced anemia. Patient continued on Vitamin B12 at 1000 mcg IM monthly at home.   · 7/5/17 - Iron 33 (), TIBC 328 (228-428), ferritin 211 (), TSH 2.46 (0.34-5.6).       · 7/31/17 - WBC 5.0,  hemoglobin 11.2, MCV 89.7, platelet count 217,000. We will continue with the Procrit 40,000 units subq weekly for chemo-induced anemia when the hemoglobin falls below 10.0 and the patient is also to continue with the Vitamin B12 at 1000 mcg IM monthly at home. Creatinine 1.24 (0.5-0.99).    · 8/28/17 - WBC 9.6, hemoglobin 8.7, MCV 93.7, platelet count 133,000. Patient is to continue with the Procrit 40,000 units subq weekly and will receive that today. She is also to continue with the Vitamin B12 at 1000 mcg IM monthly at home.  · 10/16/17 - WBC 7.5, hemoglobin 9.6, MCV 98.4, platelet count 195,000. Patient is to continue with Procrit 40,000 units subq weekly and will receive Procrit today.   · 1/1/18 - Creatinine 1.3 (0.4-1.0).    · 2/19/18 - Procrit given for hemoglobin 9.9.   · 2/26/18 - Continue Procrit 40,000 units weekly p.r.n. hemoglobin <10. Continue Vitamin B12 at 1000 mcg IM monthly at home.   · 3/26/18 - Hemoglobin 8.3. Procrit dose increased to 60,000 units weekly. Iron 29 (), TIBC 306 (228-428), and iron sat 9% (15-50). Iron 29 (), TIBC 306 (228-428), iron saturation 9% (15-50).   · 3/27/18 - Order written to start Ferrous sulfate 325 mg p.o. b.i.d.    · 4/2/18 - Stool heme negative x3.   · 4/30/18 - Patient had not started Ferrous sulfate 325 mg p.o. b.i.d. and verbalized understanding to do so and to notify the office if side effects are not tolerable.   · 5/24/18 - Patient was hospitalized at [facility] between 5/24/18 and 5/26/18 with dark tarry stool. INR was subtherapeutic. She was given IV Protonix and Protonix 40 mg daily. She underwent an EGD by David D. Sue, M.D. revealing small hiatal hernia, small submucosal nodule near the cardia, erosive gastritis. Pathology on gastric antrum and body biopsy revealed iron pill gastritis and no evidence of Helicobacter pylori. She then underwent a colonoscopy revealing diverticulosis, hemorrhoids and surgical changes from previous resection.  It was recommended to consider outpatient M2A if hemoglobin continued to drop.   · 6/4/18 - Order written to discontinue iron pills and to use Injectafer 750 mg IV days 1 and 8 for low iron sats. Order written for Procrit 40,000 units subq weekly. Iron 65 (), TIBC 328 (228-428), iron saturation 20% (15-50), ferritin 123 ().   · 6/29/18 - Discussed patient’s intolerance of oral iron due to gastritis. Oral iron discontinued with plans for Injectafer should iron level drop in the future.   · 11/7/18 - Patient claims not to be taking Vitamin B12 injections at home. Asked to resume those.   · 12/3/18 - Patient reports she has not been taking her B12 injections for a couple of months. She needs some syringes and needles for that.   · 2/4/19 - Patient is uncertain if she is currently taking Ferrous Sulfate or not. Patient with history of intolerance and will follow hemoglobin.   · 5/8/19 - Ferritin 64 ().  · 8/27/2019- iron 52 (), iron saturation 14% (15-50), TIBC 364 (228-420), and ferritin 74 ().  Injectafer 750 mg IV day 1 and 8 due to oral iron intolerance ordered.  · 8/30/2019- Injectafer 750 mg IV day 1 given.  Hemoglobin 10.8.  At the end of the infusion heart rate was 48 and the patient reported mild dizziness.  Patient was sent to the ED for evaluation with symptoms resolving rapidly.  Chest x-ray and CT head were negative for acute findings.  · 9/6/2019-Injectafer 750 mg IV day 8 given without adverse effects.  Hemoglobin 9.8.   · 9/25/19 - Iron 85 (). Iron saturation 25 (15-50). TIBC 335 (228-428). Ferritin  694 ().  Hemoglobin 10.3.  · 10/25/2019 hemoglobin 9.7.  Patient started on Retacrit 40,000 units subcu weekly.    Past Medical History:   Diagnosis Date   • Anemia 2013   • Cervical disc disorder 2013    Herniated disc, pinched nerve   • Clotting disorder (CMS/HCC) 2013    Low platelets from chemo   • Colon polyp 2013   • Deep vein thrombosis (CMS/HCC) 2013   •  Diverticulosis 2013   • GERD (gastroesophageal reflux disease) 2016   • HL (hearing loss) 2016    I need hearing aids   • Hyperlipidemia 2013    Need my cholesterol rechecked   • Hypertension    • Joint pain 2013    Shoulder pain, torn rotator cuff   • Low back pain 2013   • Neuromuscular disorder (CMS/HCC) 2015    Shingles, pinched nerves in my neck   • Osteopenia 2014   • Osteoporosis    • Ovarian cancer (CMS/[facility])    • Pneumonia 2010    Have had it several times   • Spinal stenosis 2013    Cervical   • Urinary tract infection 2013    Have had several utis       Past Surgical History:   Procedure Laterality Date   • COLON SURGERY     • COLONOSCOPY     • EXPLORATORY LAPAROTOMY     • HERNIA REPAIR     • HYSTERECTOMY     • OOPHORECTOMY           Current Outpatient Medications:   •  acetaminophen (TYLENOL) 500 MG tablet, Take 1,000 mg by mouth Every 6 (Six) Hours As Needed for Mild Pain ., Disp: , Rfl:   •  atorvastatin (LIPITOR) 20 MG tablet, Take 20 mg by mouth every night at bedtime., Disp: , Rfl: 3  •  cyanocobalamin 1000 MCG/ML injection, Inject 1,000 mcg into the appropriate muscle as directed by prescriber Every 28 (Twenty-Eight) Days., Disp: , Rfl:   •  famotidine (PEPCID) 40 MG tablet, Take 40 mg by mouth Daily., Disp: , Rfl:   •  LYRICA 100 MG capsule, Take 1 capsule by mouth 3 (Three) Times a Day., Disp: 90 capsule, Rfl: 2  •  magnesium oxide (MAG-OX) 400 MG tablet, Take 400 mg by mouth 2 (Two) Times a Day., Disp: , Rfl:   •  nitrofurantoin, macrocrystal-monohydrate, (MACROBID) 100 MG capsule, Take 1 capsule by mouth 2 (Two) Times a Day., Disp: 14 capsule, Rfl: 0  •  ondansetron ODT (ZOFRAN-ODT) 8 MG disintegrating tablet, Take 8 mg by mouth Every 8 (Eight) Hours As Needed for Nausea or Vomiting., Disp: , Rfl:   •  oxyCODONE (ROXICODONE) 10 MG tablet, Take 1 tablet by mouth 3 (Three) Times a Day As Needed for Moderate Pain ., Disp: 90 tablet, Rfl: 0  •  potassium chloride (K-DUR,KLOR-CON) 20 MEQ CR tablet,  "Take 2 tablets by mouth Every Morning., Disp: 30 tablet, Rfl: 0  •  promethazine (PHENERGAN) 25 MG tablet, Take 1 tablet by mouth Every 6 (Six) Hours As Needed for Nausea or Vomiting., Disp: 12 tablet, Rfl: 0  •  sertraline (ZOLOFT) 50 MG tablet, TAKE 1 TABLET BY MOUTH EVERY EVENING BEFORE BED, Disp: 90 tablet, Rfl: 1  •  Syringe 23G X 1\" 3 ML misc, INJECT 1 ML B-12 ONCE MONTHLY, Disp: 1 each, Rfl: 3  •  temazepam (RESTORIL) 15 MG capsule, Take 15 mg by mouth At Night As Needed., Disp: , Rfl: 2  •  triamterene-hydrochlorothiazide (DYAZIDE) 37.5-25 MG per capsule, TAKE 1 CAPSULE BY MOUTH EVERY DAY, Disp: 90 capsule, Rfl: 1  •  warfarin (COUMADIN) 1 MG tablet, Take 1 mg by mouth Daily., Disp: , Rfl: 2  •  warfarin (COUMADIN) 5 MG tablet, Active thrombosis  Indications: DVT/PE (active thrombosis), Disp: 30 tablet, Rfl: 0  No current facility-administered medications for this visit.     Facility-Administered Medications Ordered in Other Visits:   •  heparin flush (porcine) 100 UNIT/ML injection 500 Units, 500 Units, Intravenous, PRN, Daryn Tay MD  •  magnesium sulfate 2g/50 mL (PREMIX) infusion, 2 g, Intravenous, Once, Suzie Grijalva APRN  •  sodium chloride 0.9 % flush 10 mL, 10 mL, Intravenous, PRN, Daryn Tay MD    Allergies   Allergen Reactions   • Morphine Nausea Only and Hives   • Penicillin V Potassium Rash   • Sulfa Antibiotics Rash   • Levaquin [Levofloxacin] Nausea Only and Dizziness     When taken with Coumadin   • Amoxicillin Rash   • Norco [Hydrocodone-Acetaminophen] Rash       Family History   Problem Relation Age of Onset   • Hypertension Mother    • Cancer Father    • Hypertension Father    • Hypertension Sister    • Hypertension Brother    • Stroke Brother        Cancer-related family history includes Cancer in her father.    Social History     Tobacco Use   • Smoking status: Never Smoker   • Smokeless tobacco: Never Used   Substance Use Topics   • Alcohol use: No     Frequency: " "Never     Comment: caffeine 32-64oz qd   • Drug use: No       I have reviewed the history of present illness, past medical history, family history, social history, lab results, all notes and other records since the patient was last seen on 11/27/2019.    SUBJECTIVE:  The patient is here for a follow up appointment.  Reports that she has felt okay since she left the hospital last.  Reports that her chemo has not made her that sick. She asked how long she will be on this chemotherapy.           REVIEW OF SYSTEMS:  Review of Systems   Constitutional: Negative for chills and fever.   HENT: Negative for ear pain, mouth sores, nosebleeds and sore throat.    Eyes: Negative for photophobia and visual disturbance.        Wears eye glasses   Respiratory: Negative for wheezing and stridor.    Cardiovascular: Negative for chest pain and palpitations.   Gastrointestinal: Negative for abdominal pain, diarrhea, nausea and vomiting.   Endocrine: Negative for cold intolerance and heat intolerance.   Genitourinary: Negative for dysuria and hematuria.   Musculoskeletal: Negative for joint swelling and neck stiffness.   Skin: Negative for color change and rash.   Neurological: Negative for seizures and syncope.   Hematological: Negative for adenopathy.        No obvious bleeding   Psychiatric/Behavioral: Negative for agitation, confusion and hallucinations.       OBJECTIVE:    Vitals:    01/03/20 0945   BP: 120/74   Pulse: 65   Resp: 16   Temp: 98.1 °F (36.7 °C)   Weight: 82.2 kg (181 lb 3.2 oz)   Height: 165.1 cm (65\")   PainSc: 0-No pain       ECOG  (1) Restricted in physically strenuous activity, ambulatory and able to do work of light nature    Physical Exam   Constitutional: She is oriented to person, place, and time. No distress.   HENT:   Head: Normocephalic and atraumatic.   Eyes: Conjunctivae and EOM are normal. Right eye exhibits no discharge. Left eye exhibits no discharge. No scleral icterus.   Neck: Normal range of motion. " Neck supple. No thyromegaly present.   Cardiovascular: Normal rate, regular rhythm and normal heart sounds. Exam reveals no gallop and no friction rub.   Pulmonary/Chest: Effort normal. No stridor. No respiratory distress. She has no wheezes.   Left chest wall port   Abdominal: Soft. Bowel sounds are normal. She exhibits no mass. There is no tenderness. There is no rebound and no guarding.   Musculoskeletal: Normal range of motion. She exhibits no tenderness.   Lymphadenopathy:     She has no cervical adenopathy.   Neurological: She is alert and oriented to person, place, and time. She exhibits normal muscle tone.   Skin: Skin is warm. No rash noted. She is not diaphoretic. No erythema.   Psychiatric: She has a normal mood and affect. Her behavior is normal.   Nursing note and vitals reviewed.      RECENT LABS  WBC   Date Value Ref Range Status   12/27/2019 7.46 3.40 - 10.80 10*3/mm3 Final     RBC   Date Value Ref Range Status   12/27/2019 3.04 (L) 3.77 - 5.28 10*6/mm3 Final     Hemoglobin   Date Value Ref Range Status   12/27/2019 10.7 (L) 12.0 - 15.9 g/dL Final     Hematocrit   Date Value Ref Range Status   12/27/2019 33.3 (L) 34.0 - 46.6 % Final     MCV   Date Value Ref Range Status   12/27/2019 109.5 (H) 79.0 - 97.0 fL Final     MCH   Date Value Ref Range Status   12/27/2019 35.2 (H) 26.6 - 33.0 pg Final     MCHC   Date Value Ref Range Status   12/27/2019 32.1 31.5 - 35.7 g/dL Final     RDW   Date Value Ref Range Status   12/27/2019 14.6 12.3 - 15.4 % Final     RDW-SD   Date Value Ref Range Status   12/27/2019 56.5 (H) 37.0 - 54.0 fl Final     MPV   Date Value Ref Range Status   12/27/2019 11.7 6.0 - 12.0 fL Final     Platelets   Date Value Ref Range Status   12/27/2019 96 (L) 140 - 450 10*3/mm3 Final     Neutrophil %   Date Value Ref Range Status   12/27/2019 73.5 42.7 - 76.0 % Final     Lymphocyte %   Date Value Ref Range Status   12/27/2019 14.9 (L) 19.6 - 45.3 % Final     Monocyte %   Date Value Ref Range  Status   12/27/2019 11.0 5.0 - 12.0 % Final     Eosinophil %   Date Value Ref Range Status   12/27/2019 0.5 0.3 - 6.2 % Final     Basophil %   Date Value Ref Range Status   12/27/2019 0.1 0.0 - 1.5 % Final     Neutrophils, Absolute   Date Value Ref Range Status   12/27/2019 5.48 1.70 - 7.00 10*3/mm3 Final     Lymphocytes, Absolute   Date Value Ref Range Status   12/27/2019 1.11 0.70 - 3.10 10*3/mm3 Final     Monocytes, Absolute   Date Value Ref Range Status   12/27/2019 0.82 0.10 - 0.90 10*3/mm3 Final     Eosinophils, Absolute   Date Value Ref Range Status   12/27/2019 0.04 0.00 - 0.40 10*3/mm3 Final     Basophils, Absolute   Date Value Ref Range Status   12/27/2019 0.01 0.00 - 0.20 10*3/mm3 Final     nRBC   Date Value Ref Range Status   12/11/2019 0.3 (H) 0.0 - 0.2 /100 WBC Final       Lab Results   Component Value Date    GLUCOSE 69 12/20/2019    BUN 16 12/20/2019    CREATININE 0.80 12/20/2019    EGFRIFNONA 68 12/20/2019    EGFRIFAFRI >60 06/07/2019    BCR 19.0 12/20/2019    K 4.4 12/20/2019    CO2 26.0 12/20/2019    CALCIUM 9.5 12/20/2019    ALBUMIN 4.00 12/20/2019    LABIL2 1.5 06/07/2019    AST 22 12/20/2019    ALT 11 12/20/2019         Assessment/Plan     Malignant neoplasm of right ovary (CMS/HCC)  - CBC & Differential  - Adult Transthoracic Echo Complete W/ Cont if Necessary Per Protocol  - CT chest w contrast  - CT abdomen pelvis w contrast  -   - CBC Auto Differential      ASSESSMENT:    1. Recurrent ovarian cancer on carboplatinum, Doxil.  Patient had had carboplatinum Taxol, carbo Taxotere, Gemzar single agent, Niraparib and currently on Doxil and carboplatinum.  Refer to paradigm testing for future options at time of progression  2. Iron deficiency anemia  3. Pancytopenia: Due to chemotherapy  4. Hypomagnesemia  5. B12 deficiency on parenteral B12 replacement  6. Monitoring for chemotherapy toxicities    The patient claims to be tolerating chemotherapy well and her scans are stable except for a  new 2 mm nodule in the left lower lobe of the lung.  Have made her aware that if her tumors continue to shrink that we will continue her on the same treatment but if they stop shrinking, then we will stop the current treatment and observe her.  Her tumor markers have mostly been in the normal range.  She is taking 3 magnesium pills daily and in addition getting weekly IV replacement as needed.  She is not taking any oral iron as she does not absorb it.  On  Coumadin, her INR is 2.9 today.  She is to continue herself on vitamin B12 injections to switch to treatment at the [facility].    PLANS:     Continue chemotherapy.  Check 2D echocardiogram first week in February.  Also check restaging CT scans of the chest, abdomen and pelvis first week in February.  Continue magnesium oxide 400 mg three times a day and weekly IV replacement as needed.   Continue Coumadin   today  CT CAP shows stable disease except for small 2 mm nodule in the left lower lobe which will be monitored  Continue b12 injections IM monthly.  We will transition injections to be done at the [facility] center  Continue Retacrit 40,000 units subcu weekly for hemoglobin less than 10          I have reviewed labs results, imaging, vitals, and medications with the patient today. Will follow up in 1 month with me.    Patient verbalized understanding and is in agreement of the above plan.    Part of this document was scribed by Mallory Connors, RN, BSN.

## 2020-01-03 ENCOUNTER — LAB (OUTPATIENT)
Dept: LAB | Facility: HOSPITAL | Age: 65
End: 2020-01-03

## 2020-01-03 ENCOUNTER — OFFICE VISIT (OUTPATIENT)
Dept: ONCOLOGY | Facility: CLINIC | Age: 65
End: 2020-01-03

## 2020-01-03 ENCOUNTER — HOSPITAL ENCOUNTER (OUTPATIENT)
Dept: ONCOLOGY | Facility: HOSPITAL | Age: 65
Setting detail: INFUSION SERIES
Discharge: HOME | End: 2020-01-03

## 2020-01-03 ENCOUNTER — HOSPITAL ENCOUNTER (OUTPATIENT)
Dept: ONCOLOGY | Facility: HOSPITAL | Age: 65
Discharge: HOME | End: 2020-01-03
Admitting: INTERNAL MEDICINE

## 2020-01-03 ENCOUNTER — TELEPHONE (OUTPATIENT)
Dept: ONCOLOGY | Facility: HOSPITAL | Age: 65
End: 2020-01-03

## 2020-01-03 VITALS
RESPIRATION RATE: 16 BRPM | DIASTOLIC BLOOD PRESSURE: 74 MMHG | BODY MASS INDEX: 30.19 KG/M2 | TEMPERATURE: 98.1 F | WEIGHT: 181.2 LBS | HEIGHT: 65 IN | SYSTOLIC BLOOD PRESSURE: 120 MMHG | HEART RATE: 65 BPM

## 2020-01-03 DIAGNOSIS — C56.1 MALIGNANT NEOPLASM OF RIGHT OVARY (HCC): ICD-10-CM

## 2020-01-03 DIAGNOSIS — T45.1X5A PANCYTOPENIA DUE TO ANTINEOPLASTIC CHEMOTHERAPY (HCC): ICD-10-CM

## 2020-01-03 DIAGNOSIS — D64.81 ANTINEOPLASTIC CHEMOTHERAPY INDUCED ANEMIA: ICD-10-CM

## 2020-01-03 DIAGNOSIS — I82.722 CHRONIC DEEP VEIN THROMBOSIS (DVT) OF LEFT UPPER EXTREMITY, UNSPECIFIED VEIN (HCC): ICD-10-CM

## 2020-01-03 DIAGNOSIS — E83.42 HYPOMAGNESEMIA: Primary | ICD-10-CM

## 2020-01-03 DIAGNOSIS — D61.810 PANCYTOPENIA DUE TO ANTINEOPLASTIC CHEMOTHERAPY (HCC): ICD-10-CM

## 2020-01-03 DIAGNOSIS — T45.1X5A ANTINEOPLASTIC CHEMOTHERAPY INDUCED ANEMIA: ICD-10-CM

## 2020-01-03 DIAGNOSIS — I82.722 CHRONIC DEEP VEIN THROMBOSIS (DVT) OF LEFT UPPER EXTREMITY, UNSPECIFIED VEIN (HCC): Primary | ICD-10-CM

## 2020-01-03 LAB
BASOPHILS # BLD AUTO: 0.01 10*3/MM3 (ref 0–0.2)
BASOPHILS NFR BLD AUTO: 0.3 % (ref 0–1.5)
CANCER AG125 SERPL QL: 29.5 U/ML (ref 0–38.1)
DEPRECATED RDW RBC AUTO: 52.5 FL (ref 37–54)
EOSINOPHIL # BLD AUTO: 0.08 10*3/MM3 (ref 0–0.4)
EOSINOPHIL NFR BLD AUTO: 2.2 % (ref 0.3–6.2)
ERYTHROCYTE [DISTWIDTH] IN BLOOD BY AUTOMATED COUNT: 13.8 % (ref 12.3–15.4)
HCT VFR BLD AUTO: 29.7 % (ref 34–46.6)
HGB BLD-MCNC: 9.7 G/DL (ref 12–15.9)
INR PPP: 2.9
LYMPHOCYTES # BLD AUTO: 0.97 10*3/MM3 (ref 0.7–3.1)
LYMPHOCYTES NFR BLD AUTO: 27.2 % (ref 19.6–45.3)
MAGNESIUM SERPL-MCNC: 1.4 MG/DL (ref 1.6–2.4)
MCH RBC QN AUTO: 35.3 PG (ref 26.6–33)
MCHC RBC AUTO-ENTMCNC: 32.7 G/DL (ref 31.5–35.7)
MCV RBC AUTO: 108 FL (ref 79–97)
MONOCYTES # BLD AUTO: 0.35 10*3/MM3 (ref 0.1–0.9)
MONOCYTES NFR BLD AUTO: 9.8 % (ref 5–12)
NEUTROPHILS # BLD AUTO: 2.16 10*3/MM3 (ref 1.7–7)
NEUTROPHILS NFR BLD AUTO: 60.5 % (ref 42.7–76)
PLATELET # BLD AUTO: 45 10*3/MM3 (ref 140–450)
PMV BLD AUTO: 11.5 FL (ref 6–12)
RBC # BLD AUTO: 2.75 10*6/MM3 (ref 3.77–5.28)
WBC NRBC COR # BLD: 3.57 10*3/MM3 (ref 3.4–10.8)

## 2020-01-03 PROCEDURE — 25010000002 MAGNESIUM SULFATE 2 GM/50ML SOLUTION: Performed by: NURSE PRACTITIONER

## 2020-01-03 PROCEDURE — 96372 THER/PROPH/DIAG INJ SC/IM: CPT | Performed by: INTERNAL MEDICINE

## 2020-01-03 PROCEDURE — 25010000002 EPOETIN ALFA-EPBX 40000 UNIT/ML SOLUTION: Performed by: INTERNAL MEDICINE

## 2020-01-03 PROCEDURE — G0463 HOSPITAL OUTPT CLINIC VISIT: HCPCS | Performed by: INTERNAL MEDICINE

## 2020-01-03 PROCEDURE — 85610 PROTHROMBIN TIME: CPT

## 2020-01-03 PROCEDURE — 99215 OFFICE O/P EST HI 40 MIN: CPT | Performed by: INTERNAL MEDICINE

## 2020-01-03 PROCEDURE — 36415 COLL VENOUS BLD VENIPUNCTURE: CPT | Performed by: INTERNAL MEDICINE

## 2020-01-03 PROCEDURE — 86304 IMMUNOASSAY TUMOR CA 125: CPT | Performed by: NURSE PRACTITIONER

## 2020-01-03 PROCEDURE — 85025 COMPLETE CBC W/AUTO DIFF WBC: CPT | Performed by: INTERNAL MEDICINE

## 2020-01-03 PROCEDURE — 96365 THER/PROPH/DIAG IV INF INIT: CPT | Performed by: INTERNAL MEDICINE

## 2020-01-03 PROCEDURE — 83735 ASSAY OF MAGNESIUM: CPT | Performed by: NURSE PRACTITIONER

## 2020-01-03 RX ORDER — SODIUM CHLORIDE 0.9 % (FLUSH) 0.9 %
10 SYRINGE (ML) INJECTION AS NEEDED
Status: CANCELLED | OUTPATIENT
Start: 2020-01-03

## 2020-01-03 RX ORDER — SODIUM CHLORIDE 0.9 % (FLUSH) 0.9 %
10 SYRINGE (ML) INJECTION AS NEEDED
Status: DISCONTINUED | OUTPATIENT
Start: 2020-01-03 | End: 2020-01-04 | Stop reason: HOSPADM

## 2020-01-03 RX ORDER — MAGNESIUM SULFATE HEPTAHYDRATE 40 MG/ML
2 INJECTION, SOLUTION INTRAVENOUS ONCE
Status: COMPLETED | OUTPATIENT
Start: 2020-01-03 | End: 2020-01-03

## 2020-01-03 RX ADMIN — Medication 10 ML: at 11:32

## 2020-01-03 RX ADMIN — MAGNESIUM SULFATE HEPTAHYDRATE 2 G: 40 INJECTION, SOLUTION INTRAVENOUS at 10:34

## 2020-01-03 RX ADMIN — EPOETIN ALFA-EPBX 40000 UNITS: 40000 INJECTION, SOLUTION INTRAVENOUS; SUBCUTANEOUS at 11:32

## 2020-01-03 RX ADMIN — HEPARIN 500 UNITS: 100 SYRINGE at 11:33

## 2020-01-06 ENCOUNTER — OFFICE VISIT (OUTPATIENT)
Dept: PAIN MEDICINE | Facility: CLINIC | Age: 65
End: 2020-01-06

## 2020-01-06 ENCOUNTER — RESULTS ENCOUNTER (OUTPATIENT)
Dept: PAIN MEDICINE | Facility: HOSPITAL | Age: 65
End: 2020-01-06

## 2020-01-06 VITALS
TEMPERATURE: 98.3 F | WEIGHT: 185 LBS | RESPIRATION RATE: 16 BRPM | SYSTOLIC BLOOD PRESSURE: 114 MMHG | HEART RATE: 60 BPM | HEIGHT: 65 IN | BODY MASS INDEX: 30.82 KG/M2 | DIASTOLIC BLOOD PRESSURE: 75 MMHG | OXYGEN SATURATION: 97 %

## 2020-01-06 DIAGNOSIS — M79.604 LEG PAIN, BILATERAL: ICD-10-CM

## 2020-01-06 DIAGNOSIS — F19.90 CURRENT DRUG USE: Primary | ICD-10-CM

## 2020-01-06 DIAGNOSIS — Z79.899 OTHER LONG TERM (CURRENT) DRUG THERAPY: ICD-10-CM

## 2020-01-06 DIAGNOSIS — R52 PAIN: Primary | ICD-10-CM

## 2020-01-06 DIAGNOSIS — G25.81 RESTLESS LEG SYNDROME: ICD-10-CM

## 2020-01-06 DIAGNOSIS — M79.7 FIBROMYOSITIS: ICD-10-CM

## 2020-01-06 DIAGNOSIS — M50.10 CERVICAL DISC DISORDER WITH RADICULOPATHY: ICD-10-CM

## 2020-01-06 DIAGNOSIS — M79.605 LEG PAIN, BILATERAL: ICD-10-CM

## 2020-01-06 DIAGNOSIS — M47.812 OSTEOARTHRITIS OF CERVICAL SPINE WITHOUT MYELOPATHY: ICD-10-CM

## 2020-01-06 DIAGNOSIS — F19.90 CURRENT DRUG USE: ICD-10-CM

## 2020-01-06 PROCEDURE — 99213 OFFICE O/P EST LOW 20 MIN: CPT | Performed by: PHYSICAL MEDICINE & REHABILITATION

## 2020-01-06 PROCEDURE — G0463 HOSPITAL OUTPT CLINIC VISIT: HCPCS | Performed by: PHYSICAL MEDICINE & REHABILITATION

## 2020-01-06 RX ORDER — OXYCODONE HYDROCHLORIDE 10 MG/1
10 TABLET ORAL 3 TIMES DAILY PRN
Qty: 90 TABLET | Refills: 0 | Status: SHIPPED | OUTPATIENT
Start: 2020-01-06 | End: 2020-01-06 | Stop reason: SDUPTHER

## 2020-01-06 RX ORDER — OXYCODONE HYDROCHLORIDE 10 MG/1
10 TABLET ORAL 3 TIMES DAILY PRN
Qty: 90 TABLET | Refills: 0 | Status: SHIPPED | OUTPATIENT
Start: 2020-01-06 | End: 2020-04-06 | Stop reason: SDUPTHER

## 2020-01-06 NOTE — PROGRESS NOTES
"Subjective   Tahira Zimmerman is a 64 y.o. female.     neck and shoulder pain, all over aching \"bone\" due to chemo--also left rotator cuff tear also broke out with shingles-under breast on back and on head. 9/10 at worst, 2/10 at best. Nonradiating. Always present, varies, interferes with daily activities. Imaging shows metastatic disease. Lengthy prior notes reviwed, treated for metastatic disease, failed Tramadol, cannot tolerate Hydrocodone, could tolerate Oxycodone 5mg, taking BID, also Lyrica 75mg BID. Started new chemo, worsening pain in b/l hips and legs, especially at night. Now taking Oxycodone 10mg TID prn and Lyrica 100mg BID with good relief. Started another new chemo with worsening pain, on a break while insurance approves pill form with improved pain. New chemo pill lowered Mg, holding it while getting Mg infusions. Shingles outbreak. Started new chemo, has aches controlled by current meds.       The following portions of the patient's history were reviewed and updated as appropriate: allergies, current medications, past family history, past medical history, past social history, past surgical history and problem list.    Review of Systems   Constitutional: Negative for chills, fatigue and fever.   HENT: Positive for hearing loss. Negative for trouble swallowing.    Eyes: Negative for visual disturbance.   Respiratory: Negative for shortness of breath.    Cardiovascular: Negative for chest pain.   Gastrointestinal: Positive for abdominal pain and nausea. Negative for constipation, diarrhea and vomiting.   Genitourinary: Negative for urinary incontinence.   Musculoskeletal: Positive for arthralgias, back pain and neck pain. Negative for joint swelling and myalgias.   Neurological: Positive for headache. Negative for dizziness, weakness and numbness.       Objective   Physical Exam   Constitutional: She is oriented to person, place, and time. She appears well-developed and well-nourished.   HENT: "   Head: Normocephalic and atraumatic.   Eyes: Pupils are equal, round, and reactive to light. EOM are normal.   Neck: Normal range of motion.   Cardiovascular: Normal rate, regular rhythm, normal heart sounds and intact distal pulses.   Pulmonary/Chest: Breath sounds normal.   Abdominal: Soft. Bowel sounds are normal. She exhibits no distension. There is no tenderness.   Neurological: She is alert and oriented to person, place, and time. She has normal strength and normal reflexes. She displays normal reflexes. A sensory deficit is present.   Decreased in b/l stocking distribution   Psychiatric: She has a normal mood and affect. Her behavior is normal. Thought content normal.         Assessment/Plan   Tahira was seen today for neck pain.    Diagnoses and all orders for this visit:    Pain    Leg pain, bilateral    Cervical disc disorder with radiculopathy    Fibromyositis    Osteoarthritis of cervical spine without myelopathy    Other long term (current) drug therapy    Restless leg syndrome        [facility]spect reviewed, in order. Low risk. Repeat drug screen 2/14/19 in order.  Cont Oxycodone 10mg 1/2 - 1 tab TID prn, cannot tolerate Hydrocodone, failed Tramadol. May need to increase in future, possibly before next visit with starting IV chemo.  Increased to Lyrica 100mg TID for worsening pain. More effective than Gabapentin, already taking antidepressants so no plans for beginning Cymbalta.  Cont RxAlt shingles cream prn.  Cont other meds as prescribed.  No plans for procedures or TENS with metastatic disease.  RTC 34 months for f/u.

## 2020-01-07 RX ORDER — CEPHALEXIN 500 MG/1
500 CAPSULE ORAL 3 TIMES DAILY
Qty: 21 CAPSULE | Refills: 0 | Status: SHIPPED | OUTPATIENT
Start: 2020-01-07 | End: 2020-01-17

## 2020-01-10 ENCOUNTER — HOSPITAL ENCOUNTER (OUTPATIENT)
Dept: ONCOLOGY | Facility: HOSPITAL | Age: 65
Setting detail: INFUSION SERIES
End: 2020-01-10

## 2020-01-10 ENCOUNTER — HOSPITAL ENCOUNTER (OUTPATIENT)
Dept: ONCOLOGY | Facility: HOSPITAL | Age: 65
Setting detail: INFUSION SERIES
Discharge: HOME | End: 2020-01-10

## 2020-01-10 ENCOUNTER — HOSPITAL ENCOUNTER (OUTPATIENT)
Dept: ONCOLOGY | Facility: HOSPITAL | Age: 65
Discharge: HOME | End: 2020-01-10
Admitting: NURSE PRACTITIONER

## 2020-01-10 ENCOUNTER — LAB (OUTPATIENT)
Dept: LAB | Facility: HOSPITAL | Age: 65
End: 2020-01-10

## 2020-01-10 VITALS
WEIGHT: 179 LBS | TEMPERATURE: 98.3 F | BODY MASS INDEX: 29.79 KG/M2 | SYSTOLIC BLOOD PRESSURE: 127 MMHG | RESPIRATION RATE: 18 BRPM | DIASTOLIC BLOOD PRESSURE: 79 MMHG | HEART RATE: 60 BPM

## 2020-01-10 DIAGNOSIS — E83.42 HYPOMAGNESEMIA: ICD-10-CM

## 2020-01-10 DIAGNOSIS — T45.1X5A PANCYTOPENIA DUE TO ANTINEOPLASTIC CHEMOTHERAPY (HCC): ICD-10-CM

## 2020-01-10 DIAGNOSIS — D64.81 ANTINEOPLASTIC CHEMOTHERAPY INDUCED ANEMIA: Primary | ICD-10-CM

## 2020-01-10 DIAGNOSIS — C56.1 MALIGNANT NEOPLASM OF RIGHT OVARY (HCC): ICD-10-CM

## 2020-01-10 DIAGNOSIS — I82.722 CHRONIC DEEP VEIN THROMBOSIS (DVT) OF LEFT UPPER EXTREMITY, UNSPECIFIED VEIN (HCC): Primary | ICD-10-CM

## 2020-01-10 DIAGNOSIS — T45.1X5A ANTINEOPLASTIC CHEMOTHERAPY INDUCED ANEMIA: Primary | ICD-10-CM

## 2020-01-10 DIAGNOSIS — D61.810 PANCYTOPENIA DUE TO ANTINEOPLASTIC CHEMOTHERAPY (HCC): ICD-10-CM

## 2020-01-10 DIAGNOSIS — I82.722 CHRONIC DEEP VEIN THROMBOSIS (DVT) OF LEFT UPPER EXTREMITY, UNSPECIFIED VEIN (HCC): ICD-10-CM

## 2020-01-10 LAB
BASOPHILS # BLD AUTO: 0.01 10*3/MM3 (ref 0–0.2)
BASOPHILS NFR BLD AUTO: 0.2 % (ref 0–1.5)
DEPRECATED RDW RBC AUTO: 57.7 FL (ref 37–54)
EOSINOPHIL # BLD AUTO: 0.08 10*3/MM3 (ref 0–0.4)
EOSINOPHIL NFR BLD AUTO: 2 % (ref 0.3–6.2)
ERYTHROCYTE [DISTWIDTH] IN BLOOD BY AUTOMATED COUNT: 15.5 % (ref 12.3–15.4)
HCT VFR BLD AUTO: 33.5 % (ref 34–46.6)
HGB BLD-MCNC: 10.8 G/DL (ref 12–15.9)
INR PPP: 3.9
LYMPHOCYTES # BLD AUTO: 1.14 10*3/MM3 (ref 0.7–3.1)
LYMPHOCYTES NFR BLD AUTO: 28.1 % (ref 19.6–45.3)
MAGNESIUM SERPL-MCNC: 1.6 MG/DL (ref 1.6–2.4)
MCH RBC QN AUTO: 35.1 PG (ref 26.6–33)
MCHC RBC AUTO-ENTMCNC: 32.2 G/DL (ref 31.5–35.7)
MCV RBC AUTO: 108.8 FL (ref 79–97)
MONOCYTES # BLD AUTO: 0.8 10*3/MM3 (ref 0.1–0.9)
MONOCYTES NFR BLD AUTO: 19.8 % (ref 5–12)
NEUTROPHILS # BLD AUTO: 2.02 10*3/MM3 (ref 1.7–7)
NEUTROPHILS NFR BLD AUTO: 49.9 % (ref 42.7–76)
PLATELET # BLD AUTO: 67 10*3/MM3 (ref 140–450)
PMV BLD AUTO: 11.1 FL (ref 6–12)
RBC # BLD AUTO: 3.08 10*6/MM3 (ref 3.77–5.28)
WBC NRBC COR # BLD: 4.05 10*3/MM3 (ref 3.4–10.8)

## 2020-01-10 PROCEDURE — 83735 ASSAY OF MAGNESIUM: CPT | Performed by: INTERNAL MEDICINE

## 2020-01-10 PROCEDURE — 85025 COMPLETE CBC W/AUTO DIFF WBC: CPT | Performed by: INTERNAL MEDICINE

## 2020-01-10 PROCEDURE — 25010000002 MAGNESIUM SULFATE 2 GM/50ML SOLUTION: Performed by: INTERNAL MEDICINE

## 2020-01-10 PROCEDURE — 96365 THER/PROPH/DIAG IV INF INIT: CPT | Performed by: INTERNAL MEDICINE

## 2020-01-10 PROCEDURE — 85610 PROTHROMBIN TIME: CPT

## 2020-01-10 RX ORDER — SODIUM CHLORIDE 0.9 % (FLUSH) 0.9 %
10 SYRINGE (ML) INJECTION AS NEEDED
Status: CANCELLED | OUTPATIENT
Start: 2020-01-10

## 2020-01-10 RX ORDER — MAGNESIUM SULFATE HEPTAHYDRATE 40 MG/ML
2 INJECTION, SOLUTION INTRAVENOUS ONCE
Status: COMPLETED | OUTPATIENT
Start: 2020-01-10 | End: 2020-01-10

## 2020-01-10 RX ORDER — SODIUM CHLORIDE 0.9 % (FLUSH) 0.9 %
10 SYRINGE (ML) INJECTION AS NEEDED
Status: DISCONTINUED | OUTPATIENT
Start: 2020-01-10 | End: 2020-01-11 | Stop reason: HOSPADM

## 2020-01-10 RX ADMIN — MAGNESIUM SULFATE HEPTAHYDRATE 2 G: 40 INJECTION, SOLUTION INTRAVENOUS at 10:05

## 2020-01-10 RX ADMIN — HEPARIN 500 UNITS: 100 SYRINGE at 11:04

## 2020-01-10 RX ADMIN — Medication 10 ML: at 11:04

## 2020-01-10 NOTE — ADDENDUM NOTE
Encounter addended by: Kathie Gudino RN on: 1/10/2020 10:02 AM   Actions taken: Anticoagulation related activity accessed

## 2020-01-13 ENCOUNTER — HOSPITAL ENCOUNTER (OUTPATIENT)
Dept: CARDIOLOGY | Facility: HOSPITAL | Age: 65
Discharge: HOME | End: 2020-01-13
Admitting: NURSE PRACTITIONER

## 2020-01-13 ENCOUNTER — LAB (OUTPATIENT)
Dept: LAB | Facility: HOSPITAL | Age: 65
End: 2020-01-13

## 2020-01-13 ENCOUNTER — HOSPITAL ENCOUNTER (OUTPATIENT)
Dept: ONCOLOGY | Facility: HOSPITAL | Age: 65
Discharge: HOME | End: 2020-01-13
Admitting: NURSE PRACTITIONER

## 2020-01-13 VITALS
HEIGHT: 65 IN | HEART RATE: 61 BPM | SYSTOLIC BLOOD PRESSURE: 174 MMHG | WEIGHT: 179 LBS | RESPIRATION RATE: 20 BRPM | DIASTOLIC BLOOD PRESSURE: 76 MMHG | BODY MASS INDEX: 29.82 KG/M2

## 2020-01-13 VITALS — HEIGHT: 65 IN | RESPIRATION RATE: 18 BRPM | WEIGHT: 178 LBS | BODY MASS INDEX: 29.66 KG/M2

## 2020-01-13 DIAGNOSIS — I82.722 CHRONIC DEEP VEIN THROMBOSIS (DVT) OF LEFT UPPER EXTREMITY, UNSPECIFIED VEIN (HCC): Primary | ICD-10-CM

## 2020-01-13 DIAGNOSIS — I82.722 CHRONIC DEEP VEIN THROMBOSIS (DVT) OF LEFT UPPER EXTREMITY, UNSPECIFIED VEIN (HCC): ICD-10-CM

## 2020-01-13 DIAGNOSIS — C56.1 MALIGNANT NEOPLASM OF RIGHT OVARY (HCC): ICD-10-CM

## 2020-01-13 LAB — INR PPP: 2.2

## 2020-01-13 PROCEDURE — 93306 TTE W/DOPPLER COMPLETE: CPT

## 2020-01-13 PROCEDURE — 85610 PROTHROMBIN TIME: CPT

## 2020-01-13 PROCEDURE — 93356 MYOCRD STRAIN IMG SPCKL TRCK: CPT

## 2020-01-15 DIAGNOSIS — D64.81 ANTINEOPLASTIC CHEMOTHERAPY INDUCED ANEMIA: ICD-10-CM

## 2020-01-15 DIAGNOSIS — T45.1X5A ANTINEOPLASTIC CHEMOTHERAPY INDUCED ANEMIA: ICD-10-CM

## 2020-01-15 DIAGNOSIS — E83.42 HYPOMAGNESEMIA: ICD-10-CM

## 2020-01-15 DIAGNOSIS — D61.810 PANCYTOPENIA DUE TO ANTINEOPLASTIC CHEMOTHERAPY (HCC): ICD-10-CM

## 2020-01-15 DIAGNOSIS — C56.1 MALIGNANT NEOPLASM OF RIGHT OVARY (HCC): ICD-10-CM

## 2020-01-15 DIAGNOSIS — D51.0 PERNICIOUS ANEMIA: ICD-10-CM

## 2020-01-15 DIAGNOSIS — T45.1X5A PANCYTOPENIA DUE TO ANTINEOPLASTIC CHEMOTHERAPY (HCC): ICD-10-CM

## 2020-01-15 RX ORDER — CYANOCOBALAMIN 1000 UG/ML
1000 INJECTION, SOLUTION INTRAMUSCULAR; SUBCUTANEOUS ONCE
Status: CANCELLED | OUTPATIENT
Start: 2020-02-21

## 2020-01-15 RX ORDER — PALONOSETRON 0.05 MG/ML
0.25 INJECTION, SOLUTION INTRAVENOUS ONCE
Status: CANCELLED | OUTPATIENT
Start: 2020-01-24

## 2020-01-15 RX ORDER — SODIUM CHLORIDE 9 MG/ML
250 INJECTION, SOLUTION INTRAVENOUS ONCE
Status: CANCELLED | OUTPATIENT
Start: 2020-01-31

## 2020-01-15 RX ORDER — MAGNESIUM SULFATE HEPTAHYDRATE 40 MG/ML
2 INJECTION, SOLUTION INTRAVENOUS ONCE
Status: CANCELLED | OUTPATIENT
Start: 2020-01-17

## 2020-01-15 RX ORDER — SODIUM CHLORIDE 9 MG/ML
250 INJECTION, SOLUTION INTRAVENOUS ONCE
Status: CANCELLED | OUTPATIENT
Start: 2020-01-17

## 2020-01-15 RX ORDER — SODIUM CHLORIDE 9 MG/ML
250 INJECTION, SOLUTION INTRAVENOUS ONCE
Status: CANCELLED | OUTPATIENT
Start: 2020-02-07

## 2020-01-15 RX ORDER — SODIUM CHLORIDE 9 MG/ML
250 INJECTION, SOLUTION INTRAVENOUS ONCE
Status: CANCELLED | OUTPATIENT
Start: 2020-01-24

## 2020-01-15 RX ORDER — MAGNESIUM SULFATE HEPTAHYDRATE 40 MG/ML
2 INJECTION, SOLUTION INTRAVENOUS ONCE
Status: CANCELLED | OUTPATIENT
Start: 2020-02-07

## 2020-01-15 RX ORDER — FAMOTIDINE 10 MG/ML
20 INJECTION, SOLUTION INTRAVENOUS AS NEEDED
Status: CANCELLED | OUTPATIENT
Start: 2020-01-24

## 2020-01-15 RX ORDER — CYANOCOBALAMIN 1000 UG/ML
1000 INJECTION, SOLUTION INTRAMUSCULAR; SUBCUTANEOUS ONCE
Status: CANCELLED | OUTPATIENT
Start: 2020-01-17

## 2020-01-15 RX ORDER — MAGNESIUM SULFATE HEPTAHYDRATE 40 MG/ML
2 INJECTION, SOLUTION INTRAVENOUS ONCE
Status: CANCELLED | OUTPATIENT
Start: 2020-01-31

## 2020-01-15 RX ORDER — SODIUM CHLORIDE 9 MG/ML
250 INJECTION, SOLUTION INTRAVENOUS ONCE
Status: CANCELLED | OUTPATIENT
Start: 2020-02-14

## 2020-01-15 RX ORDER — DIPHENHYDRAMINE HYDROCHLORIDE 50 MG/ML
50 INJECTION INTRAMUSCULAR; INTRAVENOUS AS NEEDED
Status: CANCELLED | OUTPATIENT
Start: 2020-01-24

## 2020-01-15 RX ORDER — DEXTROSE MONOHYDRATE 50 MG/ML
250 INJECTION, SOLUTION INTRAVENOUS ONCE
Status: CANCELLED | OUTPATIENT
Start: 2020-01-24

## 2020-01-15 RX ORDER — MAGNESIUM SULFATE HEPTAHYDRATE 40 MG/ML
2 INJECTION, SOLUTION INTRAVENOUS ONCE
Status: CANCELLED | OUTPATIENT
Start: 2020-02-14

## 2020-01-15 RX ORDER — MAGNESIUM SULFATE HEPTAHYDRATE 40 MG/ML
2 INJECTION, SOLUTION INTRAVENOUS ONCE
Status: CANCELLED | OUTPATIENT
Start: 2020-01-24

## 2020-01-17 ENCOUNTER — HOSPITAL ENCOUNTER (OUTPATIENT)
Dept: ONCOLOGY | Facility: HOSPITAL | Age: 65
End: 2020-01-17

## 2020-01-17 ENCOUNTER — LAB (OUTPATIENT)
Dept: LAB | Facility: HOSPITAL | Age: 65
End: 2020-01-17

## 2020-01-17 ENCOUNTER — HOSPITAL ENCOUNTER (OUTPATIENT)
Dept: ONCOLOGY | Facility: HOSPITAL | Age: 65
Setting detail: INFUSION SERIES
Discharge: HOME | End: 2020-01-17

## 2020-01-17 ENCOUNTER — HOSPITAL ENCOUNTER (OUTPATIENT)
Dept: ONCOLOGY | Facility: HOSPITAL | Age: 65
Discharge: HOME | End: 2020-01-17
Admitting: NURSE PRACTITIONER

## 2020-01-17 VITALS
SYSTOLIC BLOOD PRESSURE: 101 MMHG | RESPIRATION RATE: 18 BRPM | TEMPERATURE: 98.3 F | BODY MASS INDEX: 29.66 KG/M2 | HEART RATE: 57 BPM | DIASTOLIC BLOOD PRESSURE: 67 MMHG | WEIGHT: 178 LBS | HEIGHT: 65 IN

## 2020-01-17 VITALS — BODY MASS INDEX: 29.62 KG/M2 | WEIGHT: 178 LBS

## 2020-01-17 DIAGNOSIS — T45.1X5A PANCYTOPENIA DUE TO ANTINEOPLASTIC CHEMOTHERAPY (HCC): ICD-10-CM

## 2020-01-17 DIAGNOSIS — Z51.11 ENCOUNTER FOR ANTINEOPLASTIC CHEMOTHERAPY: Primary | ICD-10-CM

## 2020-01-17 DIAGNOSIS — I82.722 CHRONIC DEEP VEIN THROMBOSIS (DVT) OF LEFT UPPER EXTREMITY, UNSPECIFIED VEIN (HCC): ICD-10-CM

## 2020-01-17 DIAGNOSIS — D64.81 ANTINEOPLASTIC CHEMOTHERAPY INDUCED ANEMIA: ICD-10-CM

## 2020-01-17 DIAGNOSIS — I82.722 CHRONIC DEEP VEIN THROMBOSIS (DVT) OF LEFT UPPER EXTREMITY, UNSPECIFIED VEIN (HCC): Primary | ICD-10-CM

## 2020-01-17 DIAGNOSIS — D61.810 PANCYTOPENIA DUE TO ANTINEOPLASTIC CHEMOTHERAPY (HCC): ICD-10-CM

## 2020-01-17 DIAGNOSIS — T45.1X5A ANTINEOPLASTIC CHEMOTHERAPY INDUCED ANEMIA: ICD-10-CM

## 2020-01-17 DIAGNOSIS — E83.42 HYPOMAGNESEMIA: ICD-10-CM

## 2020-01-17 DIAGNOSIS — C56.1 MALIGNANT NEOPLASM OF RIGHT OVARY (HCC): ICD-10-CM

## 2020-01-17 LAB
ALBUMIN SERPL-MCNC: 3.8 G/DL (ref 3.5–5.2)
ALBUMIN/GLOB SERPL: 1.5 G/DL
ALP SERPL-CCNC: 118 U/L (ref 39–117)
ALT SERPL W P-5'-P-CCNC: 10 U/L (ref 1–33)
ANION GAP SERPL CALCULATED.3IONS-SCNC: 9 MMOL/L (ref 5–15)
AST SERPL-CCNC: 23 U/L (ref 1–32)
BASOPHILS # BLD AUTO: 0.03 10*3/MM3 (ref 0–0.2)
BASOPHILS NFR BLD AUTO: 1 % (ref 0–1.5)
BILIRUB SERPL-MCNC: <0.2 MG/DL (ref 0.2–1.2)
BUN BLD-MCNC: 13 MG/DL (ref 8–23)
BUN/CREAT SERPL: 16.7 (ref 7–25)
CALCIUM SPEC-SCNC: 9.3 MG/DL (ref 8.6–10.5)
CHLORIDE SERPL-SCNC: 106 MMOL/L (ref 98–107)
CO2 SERPL-SCNC: 27 MMOL/L (ref 22–29)
CREAT BLD-MCNC: 0.78 MG/DL (ref 0.57–1)
CREAT BLDA-MCNC: 0.9 MG/DL (ref 0.6–1.3)
DEPRECATED RDW RBC AUTO: 57.2 FL (ref 37–54)
EOSINOPHIL # BLD AUTO: 0.05 10*3/MM3 (ref 0–0.4)
EOSINOPHIL NFR BLD AUTO: 1.6 % (ref 0.3–6.2)
ERYTHROCYTE [DISTWIDTH] IN BLOOD BY AUTOMATED COUNT: 14.9 % (ref 12.3–15.4)
FERRITIN SERPL-MCNC: 580.8 NG/ML (ref 13–150)
GFR SERPL CREATININE-BSD FRML MDRD: 74 ML/MIN/1.73
GLOBULIN UR ELPH-MCNC: 2.5 GM/DL
GLUCOSE BLD-MCNC: 89 MG/DL (ref 65–99)
HCT VFR BLD AUTO: 32.2 % (ref 34–46.6)
HGB BLD-MCNC: 10.4 G/DL (ref 12–15.9)
INR PPP: 3.3
IRON 24H UR-MRATE: 64 MCG/DL (ref 37–145)
IRON SATN MFR SERPL: 21 % (ref 20–50)
LYMPHOCYTES # BLD AUTO: 1.16 10*3/MM3 (ref 0.7–3.1)
LYMPHOCYTES NFR BLD AUTO: 36.8 % (ref 19.6–45.3)
MAGNESIUM SERPL-MCNC: 1.6 MG/DL (ref 1.6–2.4)
MCH RBC QN AUTO: 34.8 PG (ref 26.6–33)
MCHC RBC AUTO-ENTMCNC: 32.3 G/DL (ref 31.5–35.7)
MCV RBC AUTO: 107.7 FL (ref 79–97)
MONOCYTES # BLD AUTO: 0.64 10*3/MM3 (ref 0.1–0.9)
MONOCYTES NFR BLD AUTO: 20.3 % (ref 5–12)
NEUTROPHILS # BLD AUTO: 1.27 10*3/MM3 (ref 1.7–7)
NEUTROPHILS NFR BLD AUTO: 40.3 % (ref 42.7–76)
PLATELET # BLD AUTO: 166 10*3/MM3 (ref 140–450)
PMV BLD AUTO: 10.6 FL (ref 6–12)
POTASSIUM BLD-SCNC: 4.1 MMOL/L (ref 3.5–5.2)
PROT SERPL-MCNC: 6.3 G/DL (ref 6–8.5)
RBC # BLD AUTO: 2.99 10*6/MM3 (ref 3.77–5.28)
SODIUM BLD-SCNC: 142 MMOL/L (ref 136–145)
TIBC SERPL-MCNC: 304 MCG/DL (ref 298–536)
TRANSFERRIN SERPL-MCNC: 204 MG/DL (ref 200–360)
WBC NRBC COR # BLD: 3.15 10*3/MM3 (ref 3.4–10.8)

## 2020-01-17 PROCEDURE — 25010000002 MAGNESIUM SULFATE 2 GM/50ML SOLUTION: Performed by: NURSE PRACTITIONER

## 2020-01-17 PROCEDURE — 80053 COMPREHEN METABOLIC PANEL: CPT | Performed by: NURSE PRACTITIONER

## 2020-01-17 PROCEDURE — 96365 THER/PROPH/DIAG IV INF INIT: CPT | Performed by: INTERNAL MEDICINE

## 2020-01-17 PROCEDURE — 83735 ASSAY OF MAGNESIUM: CPT | Performed by: NURSE PRACTITIONER

## 2020-01-17 PROCEDURE — 84466 ASSAY OF TRANSFERRIN: CPT | Performed by: NURSE PRACTITIONER

## 2020-01-17 PROCEDURE — 83540 ASSAY OF IRON: CPT | Performed by: NURSE PRACTITIONER

## 2020-01-17 PROCEDURE — 85025 COMPLETE CBC W/AUTO DIFF WBC: CPT | Performed by: NURSE PRACTITIONER

## 2020-01-17 PROCEDURE — 85610 PROTHROMBIN TIME: CPT

## 2020-01-17 PROCEDURE — 82728 ASSAY OF FERRITIN: CPT | Performed by: NURSE PRACTITIONER

## 2020-01-17 PROCEDURE — 82565 ASSAY OF CREATININE: CPT

## 2020-01-17 RX ORDER — SODIUM CHLORIDE 0.9 % (FLUSH) 0.9 %
10 SYRINGE (ML) INJECTION AS NEEDED
Status: DISCONTINUED | OUTPATIENT
Start: 2020-01-17 | End: 2020-01-18 | Stop reason: HOSPADM

## 2020-01-17 RX ORDER — MAGNESIUM SULFATE HEPTAHYDRATE 40 MG/ML
2 INJECTION, SOLUTION INTRAVENOUS ONCE
Status: COMPLETED | OUTPATIENT
Start: 2020-01-17 | End: 2020-01-17

## 2020-01-17 RX ORDER — HEPARIN SODIUM (PORCINE) LOCK FLUSH IV SOLN 100 UNIT/ML 100 UNIT/ML
500 SOLUTION INTRAVENOUS AS NEEDED
Status: CANCELLED | OUTPATIENT
Start: 2020-01-17

## 2020-01-17 RX ORDER — SODIUM CHLORIDE 0.9 % (FLUSH) 0.9 %
10 SYRINGE (ML) INJECTION AS NEEDED
Status: CANCELLED | OUTPATIENT
Start: 2020-01-17

## 2020-01-17 RX ADMIN — HEPARIN 500 UNITS: 100 SYRINGE at 09:51

## 2020-01-17 RX ADMIN — MAGNESIUM SULFATE HEPTAHYDRATE 2 G: 40 INJECTION, SOLUTION INTRAVENOUS at 08:47

## 2020-01-17 RX ADMIN — Medication 10 ML: at 09:50

## 2020-01-17 NOTE — PROGRESS NOTES
Patient to office today for chemo. Patient ANC is 1.27, patient denies fever or any other issues. Notified NP Suzie Grijalva and order received to delay chemo until 1-21-20.

## 2020-01-20 ENCOUNTER — TELEPHONE (OUTPATIENT)
Dept: ONCOLOGY | Facility: CLINIC | Age: 65
End: 2020-01-20

## 2020-01-20 NOTE — TELEPHONE ENCOUNTER
PT WOULD LIKE TO CHANGE 1/21/20 APPT TO 1/24/20. SHE STATES IT NEEDS TO BE A Friday. SHE CAN BE REACHED ) [phone]

## 2020-01-21 ENCOUNTER — HOSPITAL ENCOUNTER (OUTPATIENT)
Dept: ONCOLOGY | Facility: HOSPITAL | Age: 65
Setting detail: INFUSION SERIES
End: 2020-01-21

## 2020-01-24 ENCOUNTER — APPOINTMENT (OUTPATIENT)
Dept: ONCOLOGY | Facility: HOSPITAL | Age: 65
End: 2020-01-24

## 2020-01-24 ENCOUNTER — HOSPITAL ENCOUNTER (OUTPATIENT)
Dept: ONCOLOGY | Facility: HOSPITAL | Age: 65
Discharge: HOME | End: 2020-01-24
Admitting: NURSE PRACTITIONER

## 2020-01-24 ENCOUNTER — HOSPITAL ENCOUNTER (OUTPATIENT)
Dept: ONCOLOGY | Facility: HOSPITAL | Age: 65
Setting detail: INFUSION SERIES
Discharge: HOME | End: 2020-01-24

## 2020-01-24 ENCOUNTER — LAB (OUTPATIENT)
Dept: LAB | Facility: HOSPITAL | Age: 65
End: 2020-01-24

## 2020-01-24 VITALS
WEIGHT: 182 LBS | RESPIRATION RATE: 16 BRPM | DIASTOLIC BLOOD PRESSURE: 69 MMHG | SYSTOLIC BLOOD PRESSURE: 129 MMHG | HEIGHT: 65 IN | HEART RATE: 71 BPM | TEMPERATURE: 97.8 F | BODY MASS INDEX: 30.32 KG/M2

## 2020-01-24 VITALS — WEIGHT: 182 LBS | RESPIRATION RATE: 18 BRPM | HEIGHT: 65 IN | BODY MASS INDEX: 30.32 KG/M2

## 2020-01-24 DIAGNOSIS — E83.42 HYPOMAGNESEMIA: Primary | ICD-10-CM

## 2020-01-24 DIAGNOSIS — D64.81 ANTINEOPLASTIC CHEMOTHERAPY INDUCED ANEMIA: ICD-10-CM

## 2020-01-24 DIAGNOSIS — T45.1X5A ANTINEOPLASTIC CHEMOTHERAPY INDUCED ANEMIA: ICD-10-CM

## 2020-01-24 DIAGNOSIS — T45.1X5A PANCYTOPENIA DUE TO ANTINEOPLASTIC CHEMOTHERAPY (HCC): ICD-10-CM

## 2020-01-24 DIAGNOSIS — Z79.01 LONG TERM (CURRENT) USE OF ANTICOAGULANTS: Primary | ICD-10-CM

## 2020-01-24 DIAGNOSIS — D61.810 PANCYTOPENIA DUE TO ANTINEOPLASTIC CHEMOTHERAPY (HCC): ICD-10-CM

## 2020-01-24 DIAGNOSIS — C56.1 MALIGNANT NEOPLASM OF RIGHT OVARY (HCC): ICD-10-CM

## 2020-01-24 DIAGNOSIS — I82.722 CHRONIC DEEP VEIN THROMBOSIS (DVT) OF LEFT UPPER EXTREMITY, UNSPECIFIED VEIN (HCC): ICD-10-CM

## 2020-01-24 LAB
ALBUMIN SERPL-MCNC: 4 G/DL (ref 3.5–5.2)
ALBUMIN/GLOB SERPL: 1.7 G/DL
ALP SERPL-CCNC: 108 U/L (ref 39–117)
ALT SERPL W P-5'-P-CCNC: 11 U/L (ref 1–33)
ANION GAP SERPL CALCULATED.3IONS-SCNC: 12 MMOL/L (ref 5–15)
AST SERPL-CCNC: 22 U/L (ref 1–32)
BASOPHILS # BLD AUTO: 0.02 10*3/MM3 (ref 0–0.2)
BASOPHILS NFR BLD AUTO: 0.6 % (ref 0–1.5)
BILIRUB SERPL-MCNC: 0.2 MG/DL (ref 0.2–1.2)
BUN BLD-MCNC: 10 MG/DL (ref 8–23)
BUN/CREAT SERPL: 12.2 (ref 7–25)
CALCIUM SPEC-SCNC: 9.4 MG/DL (ref 8.6–10.5)
CHLORIDE SERPL-SCNC: 104 MMOL/L (ref 98–107)
CO2 SERPL-SCNC: 28 MMOL/L (ref 22–29)
CREAT BLD-MCNC: 0.82 MG/DL (ref 0.57–1)
CREAT BLDA-MCNC: 0.9 MG/DL (ref 0.6–1.3)
DEPRECATED RDW RBC AUTO: 55.8 FL (ref 37–54)
EOSINOPHIL # BLD AUTO: 0.06 10*3/MM3 (ref 0–0.4)
EOSINOPHIL NFR BLD AUTO: 1.7 % (ref 0.3–6.2)
ERYTHROCYTE [DISTWIDTH] IN BLOOD BY AUTOMATED COUNT: 14.5 % (ref 12.3–15.4)
GFR SERPL CREATININE-BSD FRML MDRD: 70 ML/MIN/1.73
GLOBULIN UR ELPH-MCNC: 2.3 GM/DL
GLUCOSE BLD-MCNC: 115 MG/DL (ref 65–99)
HCT VFR BLD AUTO: 33 % (ref 34–46.6)
HGB BLD-MCNC: 10.6 G/DL (ref 12–15.9)
INR PPP: 2.5
LYMPHOCYTES # BLD AUTO: 1.32 10*3/MM3 (ref 0.7–3.1)
LYMPHOCYTES NFR BLD AUTO: 36.6 % (ref 19.6–45.3)
MAGNESIUM SERPL-MCNC: 1.3 MG/DL (ref 1.6–2.4)
MCH RBC QN AUTO: 34.3 PG (ref 26.6–33)
MCHC RBC AUTO-ENTMCNC: 32.1 G/DL (ref 31.5–35.7)
MCV RBC AUTO: 106.8 FL (ref 79–97)
MONOCYTES # BLD AUTO: 0.51 10*3/MM3 (ref 0.1–0.9)
MONOCYTES NFR BLD AUTO: 14.1 % (ref 5–12)
NEUTROPHILS # BLD AUTO: 1.7 10*3/MM3 (ref 1.7–7)
NEUTROPHILS NFR BLD AUTO: 47 % (ref 42.7–76)
PLATELET # BLD AUTO: 167 10*3/MM3 (ref 140–450)
PMV BLD AUTO: 10.3 FL (ref 6–12)
POTASSIUM BLD-SCNC: 3.5 MMOL/L (ref 3.5–5.2)
PROT SERPL-MCNC: 6.3 G/DL (ref 6–8.5)
RBC # BLD AUTO: 3.09 10*6/MM3 (ref 3.77–5.28)
SODIUM BLD-SCNC: 144 MMOL/L (ref 136–145)
WBC NRBC COR # BLD: 3.61 10*3/MM3 (ref 3.4–10.8)

## 2020-01-24 PROCEDURE — 85610 PROTHROMBIN TIME: CPT

## 2020-01-24 PROCEDURE — 80053 COMPREHEN METABOLIC PANEL: CPT | Performed by: NURSE PRACTITIONER

## 2020-01-24 PROCEDURE — 25010000002 MAGNESIUM SULFATE 2 GM/50ML SOLUTION: Performed by: NURSE PRACTITIONER

## 2020-01-24 PROCEDURE — 82565 ASSAY OF CREATININE: CPT

## 2020-01-24 PROCEDURE — 25010000002 PEGFILGRASTIM 6 MG/0.6ML PREFILLED SYRINGE KIT: Performed by: NURSE PRACTITIONER

## 2020-01-24 PROCEDURE — 25010000002 DOXORUBICIN LIPOSOMAL PER 10 MG: Performed by: NURSE PRACTITIONER

## 2020-01-24 PROCEDURE — 96417 CHEMO IV INFUS EACH ADDL SEQ: CPT | Performed by: INTERNAL MEDICINE

## 2020-01-24 PROCEDURE — 25010000002 DEXAMETHASONE SODIUM PHOSPHATE 120 MG/30ML SOLUTION: Performed by: NURSE PRACTITIONER

## 2020-01-24 PROCEDURE — 25010000002 CARBOPLATIN PER 50 MG: Performed by: NURSE PRACTITIONER

## 2020-01-24 PROCEDURE — 25010000002 FOSAPREPITANT PER 1 MG: Performed by: NURSE PRACTITIONER

## 2020-01-24 PROCEDURE — 83735 ASSAY OF MAGNESIUM: CPT | Performed by: NURSE PRACTITIONER

## 2020-01-24 PROCEDURE — 96377 APPLICATON ON-BODY INJECTOR: CPT | Performed by: INTERNAL MEDICINE

## 2020-01-24 PROCEDURE — 85025 COMPLETE CBC W/AUTO DIFF WBC: CPT | Performed by: NURSE PRACTITIONER

## 2020-01-24 PROCEDURE — 96413 CHEMO IV INFUSION 1 HR: CPT | Performed by: INTERNAL MEDICINE

## 2020-01-24 PROCEDURE — 96367 TX/PROPH/DG ADDL SEQ IV INF: CPT | Performed by: INTERNAL MEDICINE

## 2020-01-24 PROCEDURE — 96375 TX/PRO/DX INJ NEW DRUG ADDON: CPT | Performed by: INTERNAL MEDICINE

## 2020-01-24 PROCEDURE — 25010000002 PALONOSETRON 0.25 MG/5ML SOLUTION PREFILLED SYRINGE: Performed by: NURSE PRACTITIONER

## 2020-01-24 RX ORDER — OSELTAMIVIR PHOSPHATE 75 MG/1
75 CAPSULE ORAL DAILY
Qty: 10 CAPSULE | Refills: 0 | Status: SHIPPED | OUTPATIENT
Start: 2020-01-24 | End: 2020-02-21

## 2020-01-24 RX ORDER — PALONOSETRON HYDROCHLORIDE 0.05 MG/ML
0.25 INJECTION, SOLUTION INTRAVENOUS ONCE
Status: COMPLETED | OUTPATIENT
Start: 2020-01-24 | End: 2020-01-24

## 2020-01-24 RX ORDER — SODIUM CHLORIDE 9 MG/ML
250 INJECTION, SOLUTION INTRAVENOUS ONCE
Status: COMPLETED | OUTPATIENT
Start: 2020-01-24 | End: 2020-01-24

## 2020-01-24 RX ORDER — HEPARIN SODIUM (PORCINE) LOCK FLUSH IV SOLN 100 UNIT/ML 100 UNIT/ML
500 SOLUTION INTRAVENOUS AS NEEDED
Status: CANCELLED | OUTPATIENT
Start: 2020-01-24

## 2020-01-24 RX ORDER — HEPARIN SODIUM (PORCINE) LOCK FLUSH IV SOLN 100 UNIT/ML 100 UNIT/ML
500 SOLUTION INTRAVENOUS AS NEEDED
Status: DISCONTINUED | OUTPATIENT
Start: 2020-01-24 | End: 2020-01-25 | Stop reason: HOSPADM

## 2020-01-24 RX ORDER — MAGNESIUM SULFATE HEPTAHYDRATE 40 MG/ML
2 INJECTION, SOLUTION INTRAVENOUS ONCE
Status: COMPLETED | OUTPATIENT
Start: 2020-01-24 | End: 2020-01-24

## 2020-01-24 RX ORDER — SODIUM CHLORIDE 0.9 % (FLUSH) 0.9 %
10 SYRINGE (ML) INJECTION AS NEEDED
Status: CANCELLED | OUTPATIENT
Start: 2020-01-24

## 2020-01-24 RX ORDER — DEXTROSE MONOHYDRATE 50 MG/ML
250 INJECTION, SOLUTION INTRAVENOUS ONCE
Status: DISCONTINUED | OUTPATIENT
Start: 2020-01-24 | End: 2020-01-25 | Stop reason: HOSPADM

## 2020-01-24 RX ORDER — SODIUM CHLORIDE 0.9 % (FLUSH) 0.9 %
10 SYRINGE (ML) INJECTION AS NEEDED
Status: DISCONTINUED | OUTPATIENT
Start: 2020-01-24 | End: 2020-01-25 | Stop reason: HOSPADM

## 2020-01-24 RX ADMIN — DOXORUBICIN HYDROCHLORIDE 56 MG: 2 INJECTABLE, LIPOSOMAL INTRAVENOUS at 11:23

## 2020-01-24 RX ADMIN — DEXAMETHASONE SODIUM PHOSPHATE 8 MG: 4 INJECTION, SOLUTION INTRA-ARTICULAR; INTRALESIONAL; INTRAMUSCULAR; INTRAVENOUS; SOFT TISSUE at 10:56

## 2020-01-24 RX ADMIN — PALONOSETRON 0.25 MG: 0.25 INJECTION, SOLUTION INTRAVENOUS at 10:11

## 2020-01-24 RX ADMIN — SODIUM CHLORIDE 250 ML: 900 INJECTION, SOLUTION INTRAVENOUS at 10:06

## 2020-01-24 RX ADMIN — MAGNESIUM SULFATE HEPTAHYDRATE 2 G: 40 INJECTION, SOLUTION INTRAVENOUS at 09:01

## 2020-01-24 RX ADMIN — SODIUM CHLORIDE 100 ML: 900 INJECTION, SOLUTION INTRAVENOUS at 10:12

## 2020-01-24 RX ADMIN — PEGFILGRASTIM 6 MG: KIT SUBCUTANEOUS at 13:33

## 2020-01-24 RX ADMIN — CARBOPLATIN 540 MG: 10 INJECTION, SOLUTION INTRAVENOUS at 12:50

## 2020-01-24 NOTE — PROGRESS NOTES
Pt here for C13D1 Doxil, Carbo.   Pt states grandsons that live with her have both recently been diagnosed with Flu with one grandson being diagnosed on Wed.   Pt states she thinks she got flu shot,  Chart reviewed and unable to find documentation.   Pt with no symptoms at present.  Reviewed above and todays CBC with Mira Jeffrey, NP and pt to proceed with chemo as approved and begin Tamiflu.   Reviewed with pt and v/u and agreement.

## 2020-01-24 NOTE — PROGRESS NOTES
2D echo performed on 1/13/2020 has not been read.  Called 2D echo department and requested a read.  Was advised this was sent to Dr. Landry to read.  Department will send a reminder for him to read the 2D echo.    Discussed Doxil dosing with Dr. Ball.  If this 2D echo is okay, will plan for 1 additional cycle of Doxil and then discontinue patient is nearing lifetime dose.  Pharmacist patient is currently at 86% and could only receive 2 additional doses of Doxil.    Notified pharmacist Nancy Haro    Electronically signed by IRENA Dumas, 01/24/20, 1:29 PM.

## 2020-01-31 ENCOUNTER — HOSPITAL ENCOUNTER (OUTPATIENT)
Dept: ONCOLOGY | Facility: HOSPITAL | Age: 65
Discharge: HOME | End: 2020-01-31
Admitting: NURSE PRACTITIONER

## 2020-01-31 ENCOUNTER — HOSPITAL ENCOUNTER (OUTPATIENT)
Dept: ONCOLOGY | Facility: HOSPITAL | Age: 65
Setting detail: INFUSION SERIES
Discharge: HOME | End: 2020-01-31

## 2020-01-31 ENCOUNTER — LAB (OUTPATIENT)
Dept: LAB | Facility: HOSPITAL | Age: 65
End: 2020-01-31

## 2020-01-31 VITALS — HEIGHT: 65 IN | BODY MASS INDEX: 29.99 KG/M2 | RESPIRATION RATE: 18 BRPM | WEIGHT: 180 LBS

## 2020-01-31 VITALS
BODY MASS INDEX: 29.99 KG/M2 | HEART RATE: 58 BPM | RESPIRATION RATE: 18 BRPM | DIASTOLIC BLOOD PRESSURE: 74 MMHG | HEIGHT: 65 IN | TEMPERATURE: 97.9 F | WEIGHT: 180 LBS | SYSTOLIC BLOOD PRESSURE: 115 MMHG

## 2020-01-31 DIAGNOSIS — C56.1 MALIGNANT NEOPLASM OF RIGHT OVARY (HCC): Primary | ICD-10-CM

## 2020-01-31 DIAGNOSIS — D64.81 ANTINEOPLASTIC CHEMOTHERAPY INDUCED ANEMIA: ICD-10-CM

## 2020-01-31 DIAGNOSIS — I82.722 CHRONIC DEEP VEIN THROMBOSIS (DVT) OF LEFT UPPER EXTREMITY, UNSPECIFIED VEIN (HCC): ICD-10-CM

## 2020-01-31 DIAGNOSIS — D61.810 PANCYTOPENIA DUE TO ANTINEOPLASTIC CHEMOTHERAPY (HCC): ICD-10-CM

## 2020-01-31 DIAGNOSIS — E83.42 HYPOMAGNESEMIA: ICD-10-CM

## 2020-01-31 DIAGNOSIS — T45.1X5A ANTINEOPLASTIC CHEMOTHERAPY INDUCED ANEMIA: ICD-10-CM

## 2020-01-31 DIAGNOSIS — Z79.01 LONG TERM (CURRENT) USE OF ANTICOAGULANTS: Primary | ICD-10-CM

## 2020-01-31 DIAGNOSIS — T45.1X5A PANCYTOPENIA DUE TO ANTINEOPLASTIC CHEMOTHERAPY (HCC): ICD-10-CM

## 2020-01-31 LAB
BASOPHILS # BLD AUTO: 0.01 10*3/MM3 (ref 0–0.2)
BASOPHILS NFR BLD AUTO: 0.2 % (ref 0–1.5)
DEPRECATED RDW RBC AUTO: 55.3 FL (ref 37–54)
EOSINOPHIL # BLD AUTO: 0.1 10*3/MM3 (ref 0–0.4)
EOSINOPHIL NFR BLD AUTO: 2.2 % (ref 0.3–6.2)
ERYTHROCYTE [DISTWIDTH] IN BLOOD BY AUTOMATED COUNT: 14.5 % (ref 12.3–15.4)
HCT VFR BLD AUTO: 32.3 % (ref 34–46.6)
HGB BLD-MCNC: 10.3 G/DL (ref 12–15.9)
INR PPP: 3.5
LYMPHOCYTES # BLD AUTO: 1.08 10*3/MM3 (ref 0.7–3.1)
LYMPHOCYTES NFR BLD AUTO: 24.3 % (ref 19.6–45.3)
MAGNESIUM SERPL-MCNC: 1.5 MG/DL (ref 1.6–2.4)
MCH RBC QN AUTO: 34.1 PG (ref 26.6–33)
MCHC RBC AUTO-ENTMCNC: 31.9 G/DL (ref 31.5–35.7)
MCV RBC AUTO: 107 FL (ref 79–97)
MONOCYTES # BLD AUTO: 0.52 10*3/MM3 (ref 0.1–0.9)
MONOCYTES NFR BLD AUTO: 11.7 % (ref 5–12)
NEUTROPHILS # BLD AUTO: 2.74 10*3/MM3 (ref 1.7–7)
NEUTROPHILS NFR BLD AUTO: 61.6 % (ref 42.7–76)
PLATELET # BLD AUTO: 69 10*3/MM3 (ref 140–450)
PMV BLD AUTO: 11.4 FL (ref 6–12)
RBC # BLD AUTO: 3.02 10*6/MM3 (ref 3.77–5.28)
WBC NRBC COR # BLD: 4.45 10*3/MM3 (ref 3.4–10.8)

## 2020-01-31 PROCEDURE — 83735 ASSAY OF MAGNESIUM: CPT | Performed by: NURSE PRACTITIONER

## 2020-01-31 PROCEDURE — 25010000003 HEPARIN LOCK FLUCH PER 10 UNITS: Performed by: INTERNAL MEDICINE

## 2020-01-31 PROCEDURE — 25010000002 MAGNESIUM SULFATE 2 GM/50ML SOLUTION: Performed by: NURSE PRACTITIONER

## 2020-01-31 PROCEDURE — 85610 PROTHROMBIN TIME: CPT

## 2020-01-31 PROCEDURE — 96365 THER/PROPH/DIAG IV INF INIT: CPT | Performed by: INTERNAL MEDICINE

## 2020-01-31 PROCEDURE — 85025 COMPLETE CBC W/AUTO DIFF WBC: CPT | Performed by: NURSE PRACTITIONER

## 2020-01-31 RX ORDER — MAGNESIUM SULFATE HEPTAHYDRATE 40 MG/ML
2 INJECTION, SOLUTION INTRAVENOUS ONCE
Status: COMPLETED | OUTPATIENT
Start: 2020-01-31 | End: 2020-01-31

## 2020-01-31 RX ORDER — HEPARIN SODIUM (PORCINE) LOCK FLUSH IV SOLN 100 UNIT/ML 100 UNIT/ML
500 SOLUTION INTRAVENOUS AS NEEDED
Status: DISCONTINUED | OUTPATIENT
Start: 2020-01-31 | End: 2020-02-01 | Stop reason: HOSPADM

## 2020-01-31 RX ORDER — SODIUM CHLORIDE 9 MG/ML
250 INJECTION, SOLUTION INTRAVENOUS ONCE
Status: DISCONTINUED | OUTPATIENT
Start: 2020-01-31 | End: 2020-02-01 | Stop reason: HOSPADM

## 2020-01-31 RX ORDER — HEPARIN SODIUM (PORCINE) LOCK FLUSH IV SOLN 100 UNIT/ML 100 UNIT/ML
500 SOLUTION INTRAVENOUS AS NEEDED
Status: CANCELLED | OUTPATIENT
Start: 2020-01-31

## 2020-01-31 RX ORDER — SODIUM CHLORIDE 0.9 % (FLUSH) 0.9 %
10 SYRINGE (ML) INJECTION AS NEEDED
Status: CANCELLED | OUTPATIENT
Start: 2020-01-31

## 2020-01-31 RX ORDER — SODIUM CHLORIDE 0.9 % (FLUSH) 0.9 %
10 SYRINGE (ML) INJECTION AS NEEDED
Status: DISCONTINUED | OUTPATIENT
Start: 2020-01-31 | End: 2020-02-01 | Stop reason: HOSPADM

## 2020-01-31 RX ADMIN — HEPARIN 500 UNITS: 100 SYRINGE at 10:10

## 2020-01-31 RX ADMIN — MAGNESIUM SULFATE HEPTAHYDRATE 2 G: 40 INJECTION, SOLUTION INTRAVENOUS at 09:12

## 2020-01-31 RX ADMIN — Medication 10 ML: at 10:10

## 2020-02-02 LAB
BH CV ECHO MEAS - ACS: 2.2 CM
BH CV ECHO MEAS - AO MAX PG (FULL): 1.8 MMHG
BH CV ECHO MEAS - AO MAX PG: 7.1 MMHG
BH CV ECHO MEAS - AO MEAN PG (FULL): 1.3 MMHG
BH CV ECHO MEAS - AO MEAN PG: 3.8 MMHG
BH CV ECHO MEAS - AO ROOT AREA (BSA CORRECTED): 1.1
BH CV ECHO MEAS - AO ROOT AREA: 6.6 CM^2
BH CV ECHO MEAS - AO ROOT DIAM: 2.9 CM
BH CV ECHO MEAS - AO V2 MAX: 133.6 CM/SEC
BH CV ECHO MEAS - AO V2 MEAN: 91.8 CM/SEC
BH CV ECHO MEAS - AO V2 VTI: 29.2 CM
BH CV ECHO MEAS - ASC AORTA: 3.6 CM
BH CV ECHO MEAS - AVA(I,A): 2.6 CM^2
BH CV ECHO MEAS - AVA(I,D): 2.6 CM^2
BH CV ECHO MEAS - AVA(V,A): 2.3 CM^2
BH CV ECHO MEAS - AVA(V,D): 2.3 CM^2
BH CV ECHO MEAS - BSA(HAYCOCK): 1.7 M^2
BH CV ECHO MEAS - BSA: 2.6 M^2
BH CV ECHO MEAS - BZI_BMI: 1.4 KILOGRAMS/M^2
BH CV ECHO MEAS - BZI_METRIC_HEIGHT: 454.7 CM
BH CV ECHO MEAS - BZI_METRIC_WEIGHT: 29.5 KG
BH CV ECHO MEAS - EDV(CUBED): 80.7 ML
BH CV ECHO MEAS - EDV(MOD-SP2): 52.6 ML
BH CV ECHO MEAS - EDV(MOD-SP4): 63.5 ML
BH CV ECHO MEAS - EDV(TEICH): 84.1 ML
BH CV ECHO MEAS - EF(CUBED): 64.6 %
BH CV ECHO MEAS - EF(MOD-BP): 52 %
BH CV ECHO MEAS - EF(MOD-SP2): 51.8 %
BH CV ECHO MEAS - EF(MOD-SP4): 52.6 %
BH CV ECHO MEAS - EF(TEICH): 56.4 %
BH CV ECHO MEAS - ESV(CUBED): 28.6 ML
BH CV ECHO MEAS - ESV(MOD-SP2): 25.4 ML
BH CV ECHO MEAS - ESV(MOD-SP4): 30.1 ML
BH CV ECHO MEAS - ESV(TEICH): 36.7 ML
BH CV ECHO MEAS - FS: 29.3 %
BH CV ECHO MEAS - IVS/LVPW: 1.2
BH CV ECHO MEAS - IVSD: 1.4 CM
BH CV ECHO MEAS - LA DIMENSION(2D): 3 CM
BH CV ECHO MEAS - LV DIASTOLIC VOL/BSA (35-75): 24.8 ML/M^2
BH CV ECHO MEAS - LV MASS(C)D: 214 GRAMS
BH CV ECHO MEAS - LV MASS(C)DI: 83.7 GRAMS/M^2
BH CV ECHO MEAS - LV MAX PG: 5.4 MMHG
BH CV ECHO MEAS - LV MEAN PG: 2.6 MMHG
BH CV ECHO MEAS - LV SYSTOLIC VOL/BSA (12-30): 11.8 ML/M^2
BH CV ECHO MEAS - LV V1 MAX: 115.8 CM/SEC
BH CV ECHO MEAS - LV V1 MEAN: 72.5 CM/SEC
BH CV ECHO MEAS - LV V1 VTI: 28.1 CM
BH CV ECHO MEAS - LVIDD: 4.3 CM
BH CV ECHO MEAS - LVIDS: 3.1 CM
BH CV ECHO MEAS - LVOT AREA: 2.7 CM^2
BH CV ECHO MEAS - LVOT DIAM: 1.8 CM
BH CV ECHO MEAS - LVPWD: 1.2 CM
BH CV ECHO MEAS - MV A MAX VEL: 80.4 CM/SEC
BH CV ECHO MEAS - MV DEC SLOPE: 203.7 CM/SEC^2
BH CV ECHO MEAS - MV DEC TIME: 0.33 SEC
BH CV ECHO MEAS - MV E MAX VEL: 67 CM/SEC
BH CV ECHO MEAS - MV E/A: 0.83
BH CV ECHO MEAS - MV MAX PG: 3 MMHG
BH CV ECHO MEAS - MV MEAN PG: 1.1 MMHG
BH CV ECHO MEAS - MV V2 MAX: 86.9 CM/SEC
BH CV ECHO MEAS - MV V2 MEAN: 49.2 CM/SEC
BH CV ECHO MEAS - MV V2 VTI: 29.3 CM
BH CV ECHO MEAS - MVA(VTI): 2.6 CM^2
BH CV ECHO MEAS - PA ACC TIME: 0.15 SEC
BH CV ECHO MEAS - PA MAX PG (FULL): 1.8 MMHG
BH CV ECHO MEAS - PA MAX PG: 3.1 MMHG
BH CV ECHO MEAS - PA MEAN PG (FULL): 0.95 MMHG
BH CV ECHO MEAS - PA MEAN PG: 1.6 MMHG
BH CV ECHO MEAS - PA PR(ACCEL): 12.6 MMHG
BH CV ECHO MEAS - PA V2 MAX: 87.8 CM/SEC
BH CV ECHO MEAS - PA V2 MEAN: 61.3 CM/SEC
BH CV ECHO MEAS - PA V2 VTI: 20.1 CM
BH CV ECHO MEAS - PVA(I,A): 4.6 CM^2
BH CV ECHO MEAS - PVA(I,D): 4.6 CM^2
BH CV ECHO MEAS - PVA(V,A): 4.2 CM^2
BH CV ECHO MEAS - PVA(V,D): 4.2 CM^2
BH CV ECHO MEAS - QP/QS: 1.2
BH CV ECHO MEAS - RV MAX PG: 1.3 MMHG
BH CV ECHO MEAS - RV MEAN PG: 0.7 MMHG
BH CV ECHO MEAS - RV V1 MAX: 56.5 CM/SEC
BH CV ECHO MEAS - RV V1 MEAN: 39.6 CM/SEC
BH CV ECHO MEAS - RV V1 VTI: 14.1 CM
BH CV ECHO MEAS - RVDD: 3.6 CM
BH CV ECHO MEAS - RVOT AREA: 6.6 CM^2
BH CV ECHO MEAS - RVOT DIAM: 2.9 CM
BH CV ECHO MEAS - SI(AO): 75.1 ML/M^2
BH CV ECHO MEAS - SI(CUBED): 20.4 ML/M^2
BH CV ECHO MEAS - SI(LVOT): 29.3 ML/M^2
BH CV ECHO MEAS - SI(MOD-SP2): 10.7 ML/M^2
BH CV ECHO MEAS - SI(MOD-SP4): 13.1 ML/M^2
BH CV ECHO MEAS - SI(TEICH): 18.5 ML/M^2
BH CV ECHO MEAS - SV(AO): 191.9 ML
BH CV ECHO MEAS - SV(CUBED): 52.2 ML
BH CV ECHO MEAS - SV(LVOT): 74.8 ML
BH CV ECHO MEAS - SV(MOD-SP2): 27.2 ML
BH CV ECHO MEAS - SV(MOD-SP4): 33.4 ML
BH CV ECHO MEAS - SV(RVOT): 92.5 ML
BH CV ECHO MEAS - SV(TEICH): 47.4 ML

## 2020-02-02 PROCEDURE — 93356 MYOCRD STRAIN IMG SPCKL TRCK: CPT | Performed by: INTERNAL MEDICINE

## 2020-02-02 PROCEDURE — 93306 TTE W/DOPPLER COMPLETE: CPT | Performed by: INTERNAL MEDICINE

## 2020-02-03 ENCOUNTER — OFFICE VISIT (OUTPATIENT)
Dept: ONCOLOGY | Facility: CLINIC | Age: 65
End: 2020-02-03

## 2020-02-03 ENCOUNTER — OFFICE VISIT (OUTPATIENT)
Dept: LAB | Facility: HOSPITAL | Age: 65
End: 2020-02-03

## 2020-02-03 VITALS
WEIGHT: 184.2 LBS | BODY MASS INDEX: 30.69 KG/M2 | HEIGHT: 65 IN | HEART RATE: 58 BPM | TEMPERATURE: 98 F | DIASTOLIC BLOOD PRESSURE: 68 MMHG | RESPIRATION RATE: 18 BRPM | SYSTOLIC BLOOD PRESSURE: 103 MMHG

## 2020-02-03 DIAGNOSIS — T45.1X5A PANCYTOPENIA DUE TO ANTINEOPLASTIC CHEMOTHERAPY (HCC): ICD-10-CM

## 2020-02-03 DIAGNOSIS — C56.1 MALIGNANT NEOPLASM OF RIGHT OVARY (HCC): Primary | ICD-10-CM

## 2020-02-03 DIAGNOSIS — T45.1X5A ANTINEOPLASTIC CHEMOTHERAPY INDUCED ANEMIA: ICD-10-CM

## 2020-02-03 DIAGNOSIS — D61.810 PANCYTOPENIA DUE TO ANTINEOPLASTIC CHEMOTHERAPY (HCC): ICD-10-CM

## 2020-02-03 DIAGNOSIS — D64.81 ANTINEOPLASTIC CHEMOTHERAPY INDUCED ANEMIA: ICD-10-CM

## 2020-02-03 DIAGNOSIS — E83.42 HYPOMAGNESEMIA: ICD-10-CM

## 2020-02-03 LAB
BASOPHILS # BLD AUTO: 0.01 10*3/MM3 (ref 0–0.2)
BASOPHILS NFR BLD AUTO: 0.3 % (ref 0–1.5)
CANCER AG125 SERPL QL: 25.4 U/ML (ref 0–38.1)
DEPRECATED RDW RBC AUTO: 53.8 FL (ref 37–54)
EOSINOPHIL # BLD AUTO: 0.1 10*3/MM3 (ref 0–0.4)
EOSINOPHIL NFR BLD AUTO: 2.6 % (ref 0.3–6.2)
ERYTHROCYTE [DISTWIDTH] IN BLOOD BY AUTOMATED COUNT: 14.3 % (ref 12.3–15.4)
HCT VFR BLD AUTO: 29.7 % (ref 34–46.6)
HGB BLD-MCNC: 9.4 G/DL (ref 12–15.9)
LYMPHOCYTES # BLD AUTO: 1.45 10*3/MM3 (ref 0.7–3.1)
LYMPHOCYTES NFR BLD AUTO: 37.9 % (ref 19.6–45.3)
MAGNESIUM SERPL-MCNC: 1.3 MG/DL (ref 1.6–2.4)
MCH RBC QN AUTO: 33.7 PG (ref 26.6–33)
MCHC RBC AUTO-ENTMCNC: 31.6 G/DL (ref 31.5–35.7)
MCV RBC AUTO: 106.5 FL (ref 79–97)
MONOCYTES # BLD AUTO: 0.21 10*3/MM3 (ref 0.1–0.9)
MONOCYTES NFR BLD AUTO: 5.5 % (ref 5–12)
NEUTROPHILS # BLD AUTO: 2.06 10*3/MM3 (ref 1.7–7)
NEUTROPHILS NFR BLD AUTO: 53.7 % (ref 42.7–76)
PLATELET # BLD AUTO: 52 10*3/MM3 (ref 140–450)
PMV BLD AUTO: 11.1 FL (ref 6–12)
RBC # BLD AUTO: 2.79 10*6/MM3 (ref 3.77–5.28)
WBC NRBC COR # BLD: 3.83 10*3/MM3 (ref 3.4–10.8)

## 2020-02-03 PROCEDURE — 86304 IMMUNOASSAY TUMOR CA 125: CPT | Performed by: NURSE PRACTITIONER

## 2020-02-03 PROCEDURE — 83735 ASSAY OF MAGNESIUM: CPT | Performed by: NURSE PRACTITIONER

## 2020-02-03 PROCEDURE — 36415 COLL VENOUS BLD VENIPUNCTURE: CPT

## 2020-02-03 PROCEDURE — 99215 OFFICE O/P EST HI 40 MIN: CPT | Performed by: INTERNAL MEDICINE

## 2020-02-03 PROCEDURE — 85025 COMPLETE CBC W/AUTO DIFF WBC: CPT

## 2020-02-03 RX ORDER — TEMAZEPAM 30 MG/1
CAPSULE ORAL
COMMUNITY
Start: 2020-01-30 | End: 2020-02-28

## 2020-02-03 NOTE — PROGRESS NOTES
Hematology/Oncology Outpatient Follow Up    PATIENT NAME:Tahira Zimmerman  :1955  MRN: [id number]  PRIMARY CARE PHYSICIAN: Noemy Queen MD  REFERRING PHYSICIAN: Noemy Queen MD    Chief Complaint   Patient presents with   • Follow-up     Malignant Neoplasm of Right Ovary   • Follow-up     Drug level toxicity Monitoring         HISTORY OF PRESENT ILLNESS:   1. Recurrent ovarian cancer diagnosis established in 2013.  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  The patient was treated with surgery and then postoperatively with adjuvant chemotherapy consisting of Carboplatin and Taxol.  Six months later, she had recurrence of disease intra-abdominally between the rectum and vagina, which was treated with intraabdominal peritoneal chemotherapy.  She had been free of disease since that time.  She reported feeling a mass in the left side of her abdomen approximately six months ago.  This gradually grew and in early 2013 she had her daughter feel the mass.  The daughter works for Dr. David Rogers and the patient at that time also complained of intermittent rectal bleeding.  Consultation with Dr. David Rogers was obtained.  The patient had a CT scan of the abdomen and pelvis performed on 13 revealing left lower quadrant mass measuring 9.7 x 9.3 x 6 cm demonstrating areas of dense calcification, soft tissue density and cystic density within it.  Stromal tumor is felt to be most likely a possibly fibroma.  It is generated with necrosis and calcification.  Possible palpable mass in the rectus muscle is seen with calcification and deformity of the rectus muscle and just below this a second mass intra-abdominally was seen measuring 2 cm in the greatest diameter suspicious for second intraabdominal tumor.  The mass separate from the largest mass measuring 2.6 cm in the dependent portion of pelvis  is seen on the right of the midline.  No retroperitoneal lymphadenopathy was seen.  No free air, free fluid or other abnormality was present.  The patient was scheduled for an outpatient colonoscopy, but had syncopal episode while sitting in the toilet the night before and was brought to the emergency room early in the morning by her daughter and son-in-law.  The patient had apparently stopped breathing for a few seconds and did not have a pulse, but started breathing before CPR could be initiated.  In the emergency room, the patient had an EKG revealing sinus rhythm nonspecific T-wave flattening; metabolic panel revealed a glucose of 148, creatinine of 1.3, CBC was normal.  She was treated with intravenous fluid.  The patient’s case was then discussed with Dr. Anabell Monique and patient was subsequently admitted to [facility].  The patient underwent a colonoscopy by Dr. David Rogers on 06/27/13 revealing sigmoid diverticulosis and grade II internal and external hemorrhoids, but no mass or colitis was seen.  CT-guided biopsy of the mass was performed on 06/27/13 as well revealing metastatic papillary adenocarcinoma with numerous psammoma bodies.  Pathology comment stated prior records were not available; however, with history of ovarian cancer the current biopsy would be consistent with metastases from that malignancy.  Oncology consult was obtained and I initially saw the patient on 06/27/2013 where the patient had claimed to have little bit of pain in the area of disease.  She reported being frequently constipated, denies any weight loss or fevers.  She did report having some night sweats, but thought that was because of her menopause.  On 06/27/13 metastatic workup including labs and CT scans were initiated.  CT scan of the chest on 06/27/13 revealed no acute disease in the chest.  A 1.4 cm size focal area of decreased density was noted in the lateral aspect of the right lobe of the liver  consistent with a benign cyst or hemangioma.  There was a very small hiatal hernia measuring 2.2 cm in diameter.  A CT scan of the head performed 06/27/13 revealed no acute intracranial abnormality noted.  CA-125 was 40 units/ml (0-35), CA 19-9 was 11.8 units/ml (0-35), CEA level was 0.8 ng/ml (0-3), TSH level was 1.09 IU/ml (0.34-5.6).  The patient was in workup for the syncopal episode, a carotid Doppler was performed on June 28 revealing an essentially normal study showing a reduction in diameter from 0-15% bilaterally.  A Holter monitor was completed on 06/27/13, which was unremarkable except for a few premature atrial contractions.  The patient was felt stable and subsequently was discharged home on 06/28/13.  Post discharge, the patient was seen at [facility] on 07/05/13 by Dr. Kathryn Thrasher who discussed treatment options with the patient including surgery.  The patient is in the office today 07/16/13 in follow up post hospitalization.  She is reporting that surgery is planned for July 30th of this month at .  · 7/30/13 to 8/3/13 - The patient was in [facility].  The patient was admitted for surgery for her recurrent ovarian cancer.  The patient had exploratory laparotomy, omentectomy, bowel resection, liver biopsy and appendectomy.  Pathology revealed of the omentum metastatic serous carcinoma of the transverse colon, segmental resection showed metastatic serous carcinoma involving mesenteric fat.  The liver wedge resection showed fibrosclerotic thickening of the hepatic capsule.  Benign liver parenchyma.  No evidence of metastatic tumor.  Appendix showed fibrous obliteration of the lumen.  Negative for metastatic carcinoma.  Rectum and sigmoid colon segmental resection showed metastatic serous carcinoma invading the colorectal mucosa uninvolved by tumor.  Vaginal mass showed metastatic serous carcinoma.  Margins are positive for tumor.  · 9/24/13 -  Chemotherapy orders were written for patient to start carboplatin 550 mg IV day one and Taxol 330 mg IV day one to be cycled every three weeks.  · 10/10/13 - The patient started cycle #1 of chemotherapy consisting of Carboplatin and Taxol.  · 09/25/13 -  is 10.6 normal.  · 10/01/13 - CT of the chest, abdomen and pelvis revealed new reticular nodular occupancy in the anterior segment of the right upper lobe, could reflect an inflammation or infectious etiology or could reflect unusual persistence of metastatic tumor.  New liver lesions, the smaller one appears to be implant on the surface of the liver, the larger may also represent an implant including the intrahepatic.  Several small mesenteric nodes seen throughout the abdomen and pelvis.  · 10/02/13 - Port placed by Dr. Sen.  · 10/24/13 - Orders written to start Neupogen daily and if more than three Neupogen are needed to give Neulasta after next chemo.  ANC is 0.15.  · 10/31/14 - Patient given cycle 2 of chemotherapy with Taxol and Carboplatin.  · 11/21/13 - The patient started cycle 3 of chemotherapy consisting of Carboplatin and Taxol.  · 11/26/13 - CT scan of chest, abdomen and pelvis revealed no evidence of active disease in the chest.  Reticulonodular opacity has resolved.  Metastatic lesion impressing upon the hepatic dome similar to prior exam.  No evidence of a change.  No evidence of new disease.  Postsurgical changes in the pelvis and throughout the retroperitoneum in the large bowel.  Finding containing anterior abdominal wall hernia containing fat tissue and enhancing vessels, 3 cm in diameter.  · 12/2/13 - Orders written to start Neupogen per protocol as well as Aranesp due to low hemoglobin and ANC.  ANC is 0.14.  Hemoglobin is 9.7.  · 12/11/13 - Orders written to hold chemotherapy until next week and decrease dose by 20% due to low platelet count.  Platelet count is 85,000.  · 12/16/13 - The patient received cycle 4 of Carboplatin and Taxol  at a 20% dose reduction so Taxol dose is 260 mg and Carboplatin is 440 mg.  · 12/16/13 - CA-125 12.6 (N).  · 12/19/13 - PET/CT scan.  Impression:  1. There is no evidence of hypermetabolism in the liver.  Specifically of the dominant lesion in or adjacent to the right hepatic dome that was seen and described on the recent CT study of 11/26/2013.  It is photopenic relative to the surrounding liver.  2.  There is no evidence of hypermetabolic soft issue mass lesion or free fluid in the abdomen or pelvis.  3.  The tonsillar tissues are slightly enlarged and have moderate activity level.  This maybe physiologic.  Correlation with oral cavity, examination is recommended.  4.  Mild amount of activity in the right level II jugular lymph chain without focally enlarged lymph nodes is of questionable significance.  · 1/6/13 - The patient received cycle 5 of chemotherapy with Taxol and Carboplatin.  · 1/27/14 - The patient started on cycle 6 of Taxol and Carboplatin.  · 2/18/14 - CT of the abdomen and pelvis with contrast; stable lesions impressing upon the hepatic dome, unchanged compared to the prior exam.  Multiple anterior abdominal wall hernias re-demonstrated.  Those above the umbilicus contain omental fat.  Below and to the left of the umbilicus as anterior abdominal hernia containing omental fat in nondilated small bowel which is larger than seen previously.  · 2/20/14 - The patient received cycle 7 of chemotherapy with Taxol and Carboplatin.   · 3/11/14 - Order written to discontinue chemotherapy due to patient has completed 7 cycles of chemotherapy and CT scans suggest possible remission.  · 3/11/14 -  11.0 (N).  · 06/05/14 - CT scan of the chest abdomen and pelvis with contrast: There are multiple anterior abdominal wall hernias.  There are 2 larger anterior abdominal wall hernias at the level of the transverse colon, which contain omental fat.  There are at least 2 small anterior abdominal wall hernias that  contain omental fat.  There is a moderate sized anterior abdominal wall hernia at the level of the umbilicus, eccentric to the left, which contain non-dilated bowel.  Interval decrease in the size of two deposit impressing on the liver.  The third tiny deposit has been stable.  · 8/19/14 -  10.4 (N).  · 12/19/14 -   10.0 (N)  · 1/7/15 - CT chest, abdomen and pelvis with stable appearance of the chest with several micronodules. Small hiatal hernia, slightly larger than previous exam, increased from 1.5 to 2.5 cm in diameter. Bochdalek hernia unchanged. Multiple hepatic lesions. The two dominant lesions at the right hepatic dome had decreased in size from previous. The remaining tiny nodules measured 3-5 mm in diameter and were unchanged. There was no evidence of new intra-abdominal or pelvic mass or free fluid. There were multiple anterior abdominal wall hernias which had increased in size.   · 7/27/15 - Comprehensive metabolic panel with no significant abnormalities. CA-125 of 21 (0-35).   · 10/26/15 - WBC 6, hemoglobin 13, platelet count 204,000. Heart rate 47. CA-125 of 20 (0-35).    · 2/18/16 - CT chest with contrast with stable micronodular density seen in the lungs without significant change from prior exam. CT abdomen and pelvis with regression of liver lesions with no new evidence of metastasis. Multiple ventral hernias again noted without significant change from prior exam. Creatinine 0.9 (0.6-1.3).    · 2/25/16 - Patient hospitalized at [facility] between 2/25/16 and 2/27/16 with pyelonephritis secondary to E-coli. She was given two days of IV Rocephin and then placed on oral Levaquin. She was asked to hold her Coumadin, but then had nausea and vomiting because of Levaquin and stopped the Levaquin also. Her CA-125 was 32 (0-35) and CEA 1.3 (0-5). Her urine grew >100,000 E-coli. CT of the abdomen and pelvis was done which revealed 4.1 x 1.8 cm sized mild ovoid area of decreased  density in the liver parenchyma along the anteromedial aspect of the dome of the right lobe of the liver, unchanged significantly since the previous study of 2/18/16. There was suspicion of a very small pericardial effusion. There was suspicion of a small hiatal hernia. There were several hernias in the anterior abdominal wall. A 3.5 x 3.1 cm incised well-circumscribed abnormal density in the left retroperitoneal fatty soft tissue at the level of the left iliac crest was suggestive of tumor recurrence. There was an abnormal density in the left retroperitoneal soft tissues in the left flank that partially surrounded the left kidney and extended downward to the left pelvic area. Diverticulosis of the distal descending colon and sigmoid colon were seen.     · 3/8/16 - Patient prescribed Bactrim DS 1 p.o. b.i.d. for 10 days for pyelonephritis, which was partially treated with Rocephin and Levaquin. Urine with 40,000 E-coli treated with Macrobid for 7 days.  of 20 (0-35).    · 3/31/16 - PET scan with area of low density anteriorly in the right lobe of the liver with no significant radiopharmaceutical uptake to suggest malignancy. Multiple round soft tissue density masses showing increased radiopharmaceutical activity and multiple anterior abdominal wall hernias.   · 5/10/16 - Patient complains of venous enlargement of the left chest wall around the port. Has not been seen by  yet, but has an appointment on 5/23/16. Complained of a cough.   · 5/12/16 - Infusaport contrast study with no evidence of fibrin sheath. Chest x-ray with mild cardiac prominence.   · 5/23/16 - Patient seen in consultation by Sheng Bowman M.D. at [facility] and a chemotherapy trial recommended.   · 6/1/16 -   of 27.1 (0-35).    · 6/14/16 - WBC 5.71, hemoglobin 12.4, platelet count 188,000. Patient is scheduled for surgery at  on 6/16/16 after further discussion with  physicians. Discussed adjuvant chemotherapy.    · 6/16/16 - Excisional biopsy of abdominal wall mass performed by Sheng Bowman M.D. at [facility] with pathology revealing metastatic high-grade papillary serous carcinoma.   · 7/7/16 - Received a call from the patient that Tramadol was not working and that she was given Norco #24 after her biopsy at  which did help her pain. Patient prescribed Norco 5/325 one p.o. q. 4-6 hours p.r.n. #60 with no refills. Echocardiogram with left ventricular inferior wall hypokinesis with ejection fraction of 50-55%.   · 7/8/16 - Patient seen in consultation by Sheng Bowman M.D. in consultation at  for metastatic high-grade papillary serous carcinoma diagnosed by excisional biopsy on 6/16/16 with carcinomatosis and patient not a surgical resection candidate. Genetic sequencing and susceptibility testing of tumor was ordered. Biopsy was done of anterior abdominal wall.   · 7/7/16 - Echocardiogram with left atrial enlargement. Mild mitral regurgitation. Left ventricular proximal inferior wall hypokinesis with ejection fraction of 50-55%.   · 7/11/16 - While waiting for genomics results, in order not to delay treatment further, order written for Taxotere 60 mg/M2 IV over one hour followed by Carboplatin AUC 5 over one hour, cycles to be repeated every three weeks.  Comprehensive metabolic panel normal.  26 (0-35).    · 725/16 - Pain contract signed for use of Norco.   · 8/5/16 - Patient stated that she experienced a red rash and was itchy post taking Norco. Norco discontinued and Tramadol refilled.   · 8/16/16 - Patient started cycle 1 chemotherapy. WBC 7.1, hemoglobin 11.2, MCV 88.7, platelet count 94,000. Patient complained of nausea for a week post chemo. Already getting Emend, Aloxi and Decadron. Added Omeprazole 40 mg daily orally.   · 8/17/16 - Urine culture with >100,000 E-coli.   · 8/25/16 - Cycle 2 chemotherapy given.   · 9/9/16 - Magnesium 1.3, replaced intravenously. Potassium 3.4 (3.6-5.1),  increased oral potassium supplementation.    · 9/16/16 - Cycle 3 chemotherapy given.   · 9/19/16 - Comprehensive metabolic panel with no significant abnormality.   · 9/20/16 - CT abdomen and pelvis with evidence of progressive metastatic disease in the abdomen and pelvis with interval enlargement of several soft tissue attenuations and subcutaneous nodules in the anterior abdominal wall. Interval enlargement of a left pelvic soft tissue attenuation mass. A lentiform area of low attenuation in the dome of the liver stable in size. Multiple anterior abdominal wall hernias containing fat and/or bowel. Marked pelvic floor laxity. CT chest negative for evidence of metastatic disease. Tiny pulmonary nodules in the right lung stable since 2013 consistent with a benign etiology.   · 9/23/16 - Macrobid 100 mg p.o. b.i.d. x7 days prescribed for UTI. Current chemotherapy discontinued after discussion and new chemotherapy orders written. Carboplatin AUC-5 IV day 1 and Doxil (Liposomal Doxorubicin) 30 mg/M2 IV day 1 to cycle q. 21 days.  of 18 (0-35). Magnesium 1.6, replaced intravenously. Comprehensive metabolic panel with potassium 3.4 (3.6-5.1).     · 10/13/16 - Patient started on cycle 1 of Carboplatin and Liposomal Doxorubicin followed by Neulasta.   · 11/3/16 - Cycle 2 Carbo and Doxil given.   · 11/17/16 - Magnesium 1.0, replaced intravenously. Oral potassium supplement increased.   · 11/29/16 - CT chest with small non-calcified right pulmonary nodules unchanged. Benign bone island in the midthoracic vertebral body along with benign bony hemangiomas in the middle thoracic vertebral bodies. CT abdomen and pelvis with mild progressive metastatic disease from CT of September 2016. Anterior abdominal wall mass superiorly mildly increased in size with new area noted as described measuring 3 x 1.7 cm. 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 Stable area of decreased uptake in  the dome of the liver not hypermetabolic on recent PET scan, likely representing cyst or focal fatty infiltration. Stable small hiatal hernia, fat-containing Bochdalek’s hernia and anterior abdominal wall hernias with stable pelvic floor relaxation.    · 12/1/16 - Cycle 3 chemotherapy given.   · 12/8/16 - Current chemotherapy discontinued and patient started on Gemcitabine 1000 mg/M2 IV days 1, 8, 15 q. 28 days.   · 12/22/16 - Patient seen in followup by Juliana Roy M.D. at the pain clinic, treated with Oxycodone and Lyrica. Transaminases normal. Patient started cycle 1 chemotherapy.   · 12/29/16 - Magnesium 1.1 (1.8-2.5), replaced intravenously.   · 1/5/17 - Cycle 1 day 15 chemotherapy held as patient leaving for vacation to [place]. Chemotherapy dose decreased by 20%. Patient complains of diarrhea for which Imodium prescribed.   · 1/11/17 - BRCA-1 and BRCA-2 negative.   · 1/12/17 - Echocardiogram with ejection fraction 60% or greater.   · 1/19/17 - Comprehensive metabolic panel with no significant abnormality. Patient started cycle 2 chemotherapy.   · 2/2/17 - Urine with >100,000 E-coli treated with Cipro 500 mg p.o. b.i.d. x1 week.   · 2/6/17 - Magnesium 1.1 (1.8-2.5), replaced intravenously.    · 2/23/17 - Patient started cycle 3 chemotherapy.   · 2/27/17 - CT chest with interval development of too numerous to count very small pulmonary nodules, ill-defined ground glass opacities concerning for development of pulmonary metastatic disease. Stable left-sided chest port. CT abdomen and pelvis with stable appearance of multiple small soft tissue densities within the abdomen near midline subcutaneous fat of the abdominal wall. Interval decrease in size of largely calcified left pelvic mass and multiple fat in bowel containing small ventral hernias.   · 3/6/17 - Pulmonary consultation obtained for lung nodules. Discussed CT results with patient. Decision made to continue present chemotherapy until further  lung evaluation as abdominal disease better.  of 18 (0-35).    · 3/16/17 - Patient started cycle 4 chemotherapy, but treatment held from day 8 onwards because of hospitalization.   · 3/19/17 - Patient was hospitalized at [facility] between 3/19/17 and 3/25/17 with chest pain. Chest x-ray had revealed increased mild bibasilar airspace disease. Troponin I and EKG’s were negative. INR was elevated. Patient was treated with antibiotics. EGD was performed revealing small hiatal hernia and esophageal dilatation was performed. Bronchoscopy was performed also with no intrabronchial lesions identified. IgA was 51 (), IgG 320 (600-1500) and IgM 19 (). Lexiscan nuclear stress test had no evidence of reversible myocardial ischemia with normal left ventricular ejection fraction of 58%. CT chest had development of patchy bilateral ground glass opacities most pronounced involving the left upper lobe and bilateral lower lobes. Multiple tiny bilateral pulmonary nodules were less conspicuous. The patient underwent a bronchoscopy by Jerica Esparza M.D. with right upper lobe bronchoalveolar lavage revealing benign squamous cells and bronchial cells with pulmonary macrophages present. There was mild acute inflammation. Negative for malignant cells. Prilosec was changed to Famotidine for hypomagnesemia.    · 4/6/17 - Magnesium 1.2 (1.8-2.5). Comprehensive metabolic panel with no significant abnormality. Patient seen in follow-up by Fito Ram M.D. at [facility]. Treated with Lyrica and Oxycodone.    · 5/4/17 - Patient complains of a rash itching on her right upper arm. Eczematous rash noted and patient prescribed Cyclocort 0.1% b.i.d. Magnesium infusions continued with each chemo. Order written to resume chemotherapy.   · 5/16/17 - Cycle 5 chemotherapy started.   · 5/26/17 - Magnesium 1.4 (1.8-2.5), replaced intravenously. Potassium 2.9 (3.6-5.1), KCL increased to 20 mEq three in the morning and three in the  evenings.   · 5/30/17 - PET scan with remaining metabolic activity within the nodular areas. The suggested mass which is partially calcified and necrotic within the left aspect of the pelvis seems slightly larger with increased metabolic activity. There was more calcification in these areas compared to prior study, suggesting inflammation.      · 6/8/17 - Patient complaining of shortness of breath. Does take HCTZ at home. Chest x-ray ordered and chemotherapy continued along with weekly magnesium replacement. Chest x-ray with no acute cardiopulmonary abnormalities.   · 6/13/17 - Patient received cycle 6 of chemotherapy.   · 7/31/17 - WBC 5.0, hemoglobin 11.2, MCV 89.7, platelet count 217,000. Patient is to resume chemotherapy starting with cycle 7 on Wednesday of this week.  of 19 (<35). Potassium 3.3 (3.5-5.3).     · 8/2/17 - Patient began cycle 7 of chemotherapy.   · 8/30/17 - Patient started cycle 8 chemotherapy.   · 9/5/17 - Patient seen in followup at Pain Management by Fito Ram M.D. and continued on treatment with Oxycodone and Lyrica.   · 9/13/17 - Patient was hospitalized at [facility] for a day with dizziness secondary to dehydration, hypokalemia and anemia. She was given 1 unit of packed red blood cells and electrolytes were replaced. Chest x-ray revealed a subtle opacity in the left lateral lung base favored to represent atelectasis. Magnesium 1.3 (1.5-2.5), patient continued on replacement.    · 9/22/17 - Chemotherapy and magnesium replacement continued with decrease in chemotherapy dose by 10% secondary to cytopenias.   · 9/25/17 - Cycle 9 chemotherapy started.   · 10/12/17 - CT of the chest with contrast showed several tiny pulmonary nodules. One within the right lower lobe appears new from prior exams. Two adjacent foci of nodularity within the medial right lower lobe suggestive of minor infectious or inflammatory process. CT of the abdomen and pelvis with contrast which showed soft tissue  nodules along the anterior abdominal and pelvic walls unchanged from previous scan. Also a partially calcified mass within the left pelvis unchanged. No acute process identified within the abdomen or pelvis. Several left paracentral ventral hernias unchanged. No evidence of acute bowel obstruction.   · 10/16/17 - Order written for Levaquin 500 mg p.o. daily as the patient states that she has had a productive cough, fever and chills and with the results of the CT scan showing a possible infectious versus inflammatory process. Order also written to continue chemotherapy and magnesium replacement as previously prescribed.   · 10/23/17 - Cycle 10 chemotherapy started.   · 11/19/17 - Patient went to the Emergency Room at [facility] complaining of right lower extremity redness and fever for a day. Venous Doppler had no evidence of a DVT and patient was discharged home on Clindamycin.   · 11/21/17 -  of 17 (0-35).   · 12/4/17 - Cycle 11 chemotherapy started.   · 12/20/17 - PET scan with multiple hypermetabolic soft tissue nodules abutting the anterior abdominal wall, stable from previous examination. Peripherally calcified left lower quadrant mass near the ascending colon stable in size demonstrating no hypermetabolic uptake. Previously had maximum SUV of 7. Right medial basilar pulmonary nodules resolved.   · 12/22/17 - CT left lower extremity with soft tissue swelling with skin thickening present along the anterior and medial aspect of the leg, slightly more pronounced around the palpable marker seen within the upper medial aspect of the lower extremity.   · 12/26/17 - Elastic stockings prescribed for lower extremity edema.   · 1/8/18 - Patient hospitalized at [facility] between 1/8/18 and 1/11/18 with fever of up to 101 degrees and painful rash on the lower extremities of several weeks’ duration. She was found to be hypokalemic and MRI of the lower extremities revealed thickening and swelling. Chest x-ray had no acute  cardiopulmonary abnormality. Skin biopsy was performed by Surgery revealing stasis dermatitis and no evidence of malignancy. Infectious Disease consultation was obtained and IV antibiotics were stopped. Blood cultures had no growth.   · 1/22/18 - Patient asked to start wearing elastic stockings which she has not started yet. She was given a prescription for Dyazide 1 p.o. daily.   · 2/26/18 - Ordered chemo to resume again. Patient unaware that she was supposed to resume chemo after her last visit in January. Continue magnesium infusions on day 1, 8 and 15. Prescribed Levaquin 500 mg p.o. daily x10 days for productive cough x1 week. History of viral illness consistent with influenza.  of 18 (0-35). Chest x-ray with no acute process.    · 3/5/18 - Cycle 12 chemotherapy started.   · 3/26/18 - Options discussed with patient. Decision made to discontinue IV Gemzar and orders written to start on Niraparib (Zejula) 300 mg p.o. daily as maintenance therapy.   · 4/11/18 - Niraparib discontinued due to insurance denial. Orders written to start Carboplatin-AUC 5 IV day 1 cycling every 21 days. Orders written for Neulasta 6 mg subcutaneous kit day 1 with palliative treatment intent and expected duration of treatment three months.   · 4/12/18 - CT chest, abdomen and pelvis with contrast revealed numerous tiny centrilobular nodules in the lungs favored to reflect infectious or inflammatory process. Nodules ranged 1-3 mm in size and are new from prior studies with metastatic disease not entirely excluded. Stable small hiatal hernia. Interval progression of metastatic disease involving the subcutaneous tissues at the ventral abdominal wall. Multiple soft tissue density nodules previously shown to be hypermetabolic on PET scan. New small crescent of low attenuation material along the periphery of the spleen with similar finding at the dome of the liver. Findings are concerning for peritoneal metastases. Density of the dome of  the liver remained unchanged from multiple prior studies. Stable calcified mass in the left pelvic sidewall. Urinary bladder wall thickening.   · 4/23/18 - Cycle 1 chemotherapy with Carboplatin administered along with Neulasta injection.   · 4/26/18 - Neulasta discontinued due to insurance denial.   · 5/14/18 - Patient received cycle 2 Carboplatin.   · 6/5/18 - Cycle 3 day 1 chemotherapy with Carboplatin administered.   · 6/20/18 - CT chest, abdomen and pelvis at [facility] showed re-demonstration of soft tissue mass in the ventral wall hernia left of the midline as well as the dome of the liver, likely representing metastatic disease similar as compared to the prior study. Additional calcified lesions present in the upper left hemipelvis and along the anterior abdominal wall to the right of the midline also likely representing metastatic disease. No definite new lesions were identified. Moderate to large stool burden likely related to mild constipation was present. No suspicious lung nodules were identified. The previously-described ground glass nodules appeared to have resolved. There was a stable subpleural nodule in the right upper lobe measuring 3 mm present since 2014 without significant changes.   · 6/26/18 - Cycle 4 day 1 Carboplatin administered with addition of Neulasta for febrile neutropenia prophylaxis.   · 6/29/18 - Reviewed CT scans with patient. Orders written to discontinue Carboplatin and Neulasta and plan to start Niraparib 300 mg by mouth daily as maintenance therapy. Prescribed Amlodipine 2.5 mg p.o. daily for chemo-induced hypertension.   · 7/18/18 - Orders written to increase weekly Magnesium Sulfate to 3 g IV weekly due to continued hypomagnesemia.   · 8/1/18 - Patient reports she has not received Niraparib (Zejula) to date.   · 8/30/18 - Patient’s phone was not working so though Niraparib approved, the drug company had not been able to get ahold of her. Patient supplied with drug  company number so that she can start taking the pills.   · 9/6/18 -  of 20 (N).   · 9/18/18 - Urinalysis done for dysuria and back pain. Urine culture grew 20,000 to 50,000 colonies of urogenital ruy. Prescribed Bactrim DS one tablet twice daily for one week.   · September 2018 - Patient initiated on Zejula 300 mg p.o. daily.   · 10/1/18 - WBC 3.5, hemoglobin 12, platelet count 74,000. Niraparib (Zejula) dose held and then resumed when platelets above 100,000 at a dose of 200 mg daily.   · 10/15/18 - Patient received Niraparib (Zejula) at 200 mg p.o. daily with a platelet count of 198,000.   · 11/7/18 - WBC 6, hemoglobin 11.6, MCV 96.2, platelet count 137,000. Patient claims to have stopped taking Niraparib a few days prior because of cough. Asked to resume taking it. Ciprofloxacin 500 mg p.o. twice daily for one week for bronchitis.   · 12/3/18 - Magnesium Sulfate infusions changed to every two weeks, to send mag level before and give 3 grams. DC’d Magnesium Oxide and started Magnesium Plus Protein two pills twice daily.   · 1/4/19 - CT chest, abdomen and pelvis with new patchy nodular areas of airspace consolidation within the bilateral upper lobes, left greater than right, favored to be infectious or inflammatory. Interval enlargement of the peritoneal soft tissue masses within the pelvis compatible with progression of disease. Enlarging ventral hernia now containing multiple loops of small bowel and colon.   · 1/7/19 - Patient has not been coming in for weekly magnesium replacement as nursing has not been able to get ahold of her. Results of CT’s discussed with patient. Decision made to change her to IV chemotherapy with Carboplatin AUC-5 day 1 and pegylated liposomal Doxorubicin (Doxil) 30 mg/M2 IV day 1 to cycle every four weeks. Patient made aware of the limited choices of her treatment available.   · 1/31/19 - Echocardiogram showed LVEF 50-55% and otherwise normal echo Doppler study.   · 2/4/19 - New  chemotherapy not initiated to date with difficulty contacting patient for echocardiogram. Neulasta On-Pro 6 mg ordered with chemotherapy due to extensive prior exposure to chemotherapy, increasing risk of febrile neutropenia, history of neutropenic events on prior chemotherapy regimens.   · 2/15/19 - Patient started cycle 1 chemotherapy with Neulasta support.   · 3/6/19 -  of 28 (0-35).   · 3/15/19 - Cycle 2 Carboplatin with Liposomal Doxorubicin (Doxil) and Neulasta support initiated.     · 4/10/19 - Patient was requested to followup with Dr. Queen regarding hypotension and blood pressure medication dosing. Patient appears to be tolerating treatment well with cytopenias not requiring dose adjustments. No Doxil-related skin or mucus membrane toxicities or other significant toxicities to date.   · 4/12/19 - Cycle 3 chemotherapy given.   · 4/16/19 - CT chest, abdomen and pelvis with no evidence of active metastasis to the chest. Several tree-in-bud nodules within the periphery of the inferior right upper lobe likely infectious or inflammatory. Disease burden within abdomen and pelvis stable to slightly increased from prior CT. Specifically, a soft tissue mass along the right rectus muscle slightly increased in bulk. Multiple ventral hernias, some containing loops of bowel, with no evidence of acute bowel obstruction.   · 5/8/19 - Discussed CT results with patient. Overall stable disease and would like to continue same treatment. Dove soap and Hydrocortisone Cream p.r.n. prescribed for scattered rash on back.   · 5/10/19 - Cycle 4 Carboplatin and Liposomal Doxorubicin initiated.   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 It was ER positive, HER2/zuleima negative, PD-L1 negative. There was TOPO1 positivity and TUBB3 positivity. TMB was low (two mutations per megabyte MUTS/MB) and MSI was stable. There were 13 therapies considered with increased  benefit. The 13 therapies with increased benefit included Fulvestrant, Irinotecan, Letrozole, Topotecan, Anastrazole, Exemestane, Tamoxifen, all of which were referenced to [facility] as well as Abemaciclib, Everolimus, Gefitinib, Palbociclib, Ribociclib and Toremifene.     · 6/5/19 echocardiogram with preserved LV systolic function with EF around 60%.  · 6/7/19 cycle 5 chemotherapy with Neulasta support given.  Patient continued on mag oxide 400 mg p.o. twice daily and weekly IV 3 g mag sulfate infusions for persistent hypomagnesemia.  · 7/5/2019- cycle 6 chemotherapy with carboplatin and doxorubicin with Neulasta port initiated.  Ca125:  21 (0-35).  · 7/23/2019-CT chest abdomen and pelvis compared to CT from 4/16/2019 revealed multiple small pulmonary nodules unchanged from prior examination within the right upper and left lower lobes.  Complex ventral hernia similar to prior exam.  Soft tissue nodule along the right lateral margin of the right of the midline measuring 12 x 10 mm unchanged in size.  Irregular soft tissue nodular thickening along the margin of the most inferior aspect of the complex ventral hernia with thickness measuring up to 13 mm similar to prior examination.  Extensive calcified and soft tissue mass within the left lower quadrant decrease in size now measuring 2.6 x 2.3 cm previously 3.0 x 2.5 cm.  Partially calcified mass involving the right rectus sheath measuring 3.1 x 2.7 cm previously measured 3.3 x 2.9 cm.  Densely calcified mass measuring 2.1 x 1.6 cm unchanged previously measured 2 x 1.7 cm.  Right external iliac chain lymph node measuring 9 mm previously measured 5 mm.  · 8/2/2019 cycle 7 chemotherapy given.  · 8/30/2019-cycle 8 chemotherapy with carboplatin and doxorubicin with Neulasta support given.  · 9/27/2019 cycle 9 chemotherapy given.  · 10/1/19 - Echocardiogram showed Normal LV size and contractility EF of 60-65%. Normal RV size. Borderline left atrial enlargement. Aortic valve,  mitral valve, tricuspid valve appears structurally normal, no significant regurgitation seen.No pericardial effusion seen. Proximal aorta appears normal in size.  · 10/18/19 - Magnesium 1.5 (1.8-2.5).  IV magnesium replacement continued.  · 10/25/2019 cycle 10 chemotherapy given.  · 10/29/2019 patient had CT scan of the chest abdomen and pelvis-there is a new 2 mm nodule in the left lower lobe with a stable 2 mm nodule in the left lower lobe.  Follow-up in 6 months was recommended.  Stable multiple soft tissue density and calcified nodules within the ventral abdominal wall and left retroperitoneal fat consistent with nonviable metastatic disease.  These nodules have either decreased in size or stable.  · 11/22/2019 10 received cycle 11 of chemotherapy with Doxil and carboplatinum with Neulasta  · 12/8/2019 to 12/11/2019 patient was admitted to the hospital for neutropenic fever.  · 12/20/2019 patient received cycle 12 of chemotherapy with Doxil and carboplatinum with Neulasta  · 1/13/20: 2 D echo was normal with EF 56% to 60%  · 1/24/20-Patient received cycle 13 of combination chemotherapy with carboplatinum and Doxil     2. Deep vein thrombosis of left upper extremity diagnosis established in October of 2013.  · 10/16/13 - Venous Doppler of left upper extremity.  Impression:  Deep vein thrombosis involving the subclavian, axillary and brachial veins on the left side, superficial vein thrombosis involving the left basilic vein.  · 10/16/13 - Order written for Lovenox 120 mg subcutaneously daily until INR is in therapeutic range.  Order written for Coumadin 5 mg p.o. daily.  The patient is to follow PT and INR protocol.  · 5/20/16 - Venous Doppler bilateral lower extremities normal.  · 7/30/19 - Patient continued on Coumadin following the INR protocol.      3. Anemia diagnosis established in November 2013 and pernicious anemia diagnosis established March 2016.   · 11/15/13 - Hemoglobin is 7.8.  · 12/2/13 - Orders  written to start Aranesp 200 mg subcutaneous every two weeks due to low hemoglobin secondary to chemo induced anemia.  Hemoglobin is 9.7.  Anemia workup is ordered.  · 12/03/13 - Ferritin 179.8 (N), folic acid 8.3 (N), vitamin B12 314, iron 104 (N), TIBC 298 (N), iron saturation 35 (N), Reticulocyte count 0.88 (N).  EPO level 124 (N).  Haptoglobin 22 (H).  · 10/22/14 - Hemoglobin 12.5, hematocrit 37.3, MCV 91.6.  · 10/23/14 - Anemia resolved.   · 3/8/16 - WBC 5.8, hemoglobin 11.7, MCV 90.3, platelet count 245,000. Vitamin B12 of 189 (180-914), ferritin 61 (), iron 91 (), TIBC 345 (228-428).   · 3/15/16 -  ().        · 3/22/16 - Intrinsic factor antibody positive, antiparietal antibody negative. Vitamin B12 at 1000 mcg IM weekly started, but the patient after receiving first dose on 3/22/16 did not come back for injections until 4/13/16.   · 4/13/16 - WBC 5.1, hemoglobin 12.5, MCV 87.9, platelet count 201,000. Patient given B12 injection and then continued at home.   · 5/10/16 - WBC 5.1, hemoglobin 12.5, platelet count 201,000.   · 6/14/16 - Monthly Vitamin B12 injections continued at home.   · 7/11/16 - WBC 5.48, hemoglobin 12.7, MCV 86.2, platelet count 200,000.   · 8/16/16 - Patient continued on monthly Vitamin B12 injections at home.   · 1/5/17 - WBC 2.6 with 38% neutrophils, 56% lymphocytes, 5% monocytes, hemoglobin 10.5, .3, platelet count 63,000. Patient continued on Vitamin B12 injections 1000 mcg IM monthly at home.   · 3/20/17 - Retic 0.41 (0.5-1.5), creatinine 1.0 (0.4-1.0). Iron 12 (), TIBC 270 (228-428), ferritin 259 (), haptoglobin 340 (), TSH 0.43 (0.34-5.6), folate 6.0 (5.9-24.8). Serum protein electrophoresis revealed decreased gammaglobulins. Serum EDU had no monoclonal gammopathy identified. Stool Hemoccult was negative.   · 6/8/17 - WBC 6.3, hemoglobin 8.3, MCV 92.4, platelet count 50,000. Procrit 40,000 units subq weekly added for  chemotherapy-induced anemia. Patient continued on Vitamin B12 at 1000 mcg IM monthly at home.   · 7/5/17 - Iron 33 (), TIBC 328 (228-428), ferritin 211 (), TSH 2.46 (0.34-5.6).       · 7/31/17 - WBC 5.0, hemoglobin 11.2, MCV 89.7, platelet count 217,000. We will continue with the Procrit 40,000 units subq weekly for chemo-induced anemia when the hemoglobin falls below 10.0 and the patient is also to continue with the Vitamin B12 at 1000 mcg IM monthly at home. Creatinine 1.24 (0.5-0.99).    · 8/28/17 - WBC 9.6, hemoglobin 8.7, MCV 93.7, platelet count 133,000. Patient is to continue with the Procrit 40,000 units subq weekly and will receive that today. She is also to continue with the Vitamin B12 at 1000 mcg IM monthly at home.  · 10/16/17 - WBC 7.5, hemoglobin 9.6, MCV 98.4, platelet count 195,000. Patient is to continue with Procrit 40,000 units subq weekly and will receive Procrit today.   · 1/1/18 - Creatinine 1.3 (0.4-1.0).    · 2/19/18 - Procrit given for hemoglobin 9.9.   · 2/26/18 - Continue Procrit 40,000 units weekly p.r.n. hemoglobin <10. Continue Vitamin B12 at 1000 mcg IM monthly at home.   · 3/26/18 - Hemoglobin 8.3. Procrit dose increased to 60,000 units weekly. Iron 29 (), TIBC 306 (228-428), and iron sat 9% (15-50). Iron 29 (), TIBC 306 (228-428), iron saturation 9% (15-50).   · 3/27/18 - Order written to start Ferrous sulfate 325 mg p.o. b.i.d.    · 4/2/18 - Stool heme negative x3.   · 4/30/18 - Patient had not started Ferrous sulfate 325 mg p.o. b.i.d. and verbalized understanding to do so and to notify the office if side effects are not tolerable.   · 5/24/18 - Patient was hospitalized at [facility] between 5/24/18 and 5/26/18 with dark tarry stool. INR was subtherapeutic. She was given IV Protonix and Protonix 40 mg daily. She underwent an EGD by David Rogers M.D. revealing small hiatal hernia, small submucosal nodule near the cardia, erosive gastritis. Pathology on  gastric antrum and body biopsy revealed iron pill gastritis and no evidence of Helicobacter pylori. She then underwent a colonoscopy revealing diverticulosis, hemorrhoids and surgical changes from previous resection. It was recommended to consider outpatient M2A if hemoglobin continued to drop.   · 6/4/18 - Order written to discontinue iron pills and to use Injectafer 750 mg IV days 1 and 8 for low iron sats. Order written for Procrit 40,000 units subq weekly. Iron 65 (), TIBC 328 (228-428), iron saturation 20% (15-50), ferritin 123 ().   · 6/29/18 - Discussed patient’s intolerance of oral iron due to gastritis. Oral iron discontinued with plans for Injectafer should iron level drop in the future.   · 11/7/18 - Patient claims not to be taking Vitamin B12 injections at home. Asked to resume those.   · 12/3/18 - Patient reports she has not been taking her B12 injections for a couple of months. She needs some syringes and needles for that.   · 2/4/19 - Patient is uncertain if she is currently taking Ferrous Sulfate or not. Patient with history of intolerance and will follow hemoglobin.   · 5/8/19 - Ferritin 64 ().  · 8/27/2019- iron 52 (), iron saturation 14% (15-50), TIBC 364 (228-420), and ferritin 74 ().  Injectafer 750 mg IV day 1 and 8 due to oral iron intolerance ordered.  · 8/30/2019- Injectafer 750 mg IV day 1 given.  Hemoglobin 10.8.  At the end of the infusion heart rate was 48 and the patient reported mild dizziness.  Patient was sent to the ED for evaluation with symptoms resolving rapidly.  Chest x-ray and CT head were negative for acute findings.  · 9/6/2019-Injectafer 750 mg IV day 8 given without adverse effects.  Hemoglobin 9.8.   · 9/25/19 - Iron 85 (). Iron saturation 25 (15-50). TIBC 335 (228-428). Ferritin  694 ().  Hemoglobin 10.3.  · 10/25/2019 hemoglobin 9.7.  Patient started on Retacrit 40,000 units subcu weekly.    Past Medical History:   Diagnosis  Date   • Anemia 2013   • Cervical disc disorder 2013    Herniated disc, pinched nerve   • Clotting disorder (CMS/HCC) 2013    Low platelets from chemo   • Colon polyp 2013   • Deep vein thrombosis (CMS/HCC) 2013   • Diverticulosis 2013   • GERD (gastroesophageal reflux disease) 2016   • HL (hearing loss) 2016    I need hearing aids   • Hyperlipidemia 2013    Need my cholesterol rechecked   • Hypertension    • Joint pain 2013    Shoulder pain, torn rotator cuff   • Low back pain 2013   • Neuromuscular disorder (CMS/[facility]) 2015    Shingles, pinched nerves in my neck   • Osteopenia 2014   • Osteoporosis    • Ovarian cancer (CMS/[facility])    • Pneumonia 2010    Have had it several times   • Spinal stenosis 2013    Cervical   • Urinary tract infection 2013    Have had several utis       Past Surgical History:   Procedure Laterality Date   • COLON SURGERY     • COLONOSCOPY     • EXPLORATORY LAPAROTOMY     • HERNIA REPAIR     • HYSTERECTOMY     • OOPHORECTOMY           Current Outpatient Medications:   •  acetaminophen (TYLENOL) 500 MG tablet, Take 1,000 mg by mouth Every 6 (Six) Hours As Needed for Mild Pain ., Disp: , Rfl:   •  atorvastatin (LIPITOR) 20 MG tablet, Take 20 mg by mouth every night at bedtime., Disp: , Rfl: 3  •  cyanocobalamin 1000 MCG/ML injection, Inject 1,000 mcg into the appropriate muscle as directed by prescriber Every 28 (Twenty-Eight) Days., Disp: , Rfl:   •  famotidine (PEPCID) 40 MG tablet, Take 40 mg by mouth Daily., Disp: , Rfl:   •  LYRICA 100 MG capsule, Take 1 capsule by mouth 3 (Three) Times a Day., Disp: 90 capsule, Rfl: 2  •  magnesium oxide (MAG-OX) 400 MG tablet, Take 400 mg by mouth 2 (Two) Times a Day., Disp: , Rfl:   •  oseltamivir (TAMIFLU) 75 MG capsule, Take 1 capsule by mouth Daily., Disp: 10 capsule, Rfl: 0  •  oxyCODONE (ROXICODONE) 10 MG tablet, Take 1 tablet by mouth 3 (Three) Times a Day As Needed for Moderate Pain ., Disp: 90 tablet, Rfl: 0  •  potassium chloride (K-DUR,KLOR-CON)  "20 MEQ CR tablet, Take 2 tablets by mouth Every Morning., Disp: 30 tablet, Rfl: 0  •  sertraline (ZOLOFT) 50 MG tablet, TAKE 1 TABLET BY MOUTH EVERY EVENING BEFORE BED, Disp: 90 tablet, Rfl: 1  •  Syringe 23G X 1\" 3 ML misc, INJECT 1 ML B-12 ONCE MONTHLY, Disp: 1 each, Rfl: 3  •  temazepam (RESTORIL) 30 MG capsule, , Disp: , Rfl:   •  triamterene-hydrochlorothiazide (DYAZIDE) 37.5-25 MG per capsule, TAKE 1 CAPSULE BY MOUTH EVERY DAY, Disp: 90 capsule, Rfl: 1  •  warfarin (COUMADIN) 5 MG tablet, Active thrombosis  Indications: DVT/PE (active thrombosis), Disp: 30 tablet, Rfl: 0  •  nitrofurantoin, macrocrystal-monohydrate, (MACROBID) 100 MG capsule, Take 1 capsule by mouth 2 (Two) Times a Day., Disp: 14 capsule, Rfl: 0  •  ondansetron ODT (ZOFRAN-ODT) 8 MG disintegrating tablet, Take 8 mg by mouth Every 8 (Eight) Hours As Needed for Nausea or Vomiting., Disp: , Rfl:   •  promethazine (PHENERGAN) 25 MG tablet, Take 1 tablet by mouth Every 6 (Six) Hours As Needed for Nausea or Vomiting., Disp: 12 tablet, Rfl: 0  •  warfarin (COUMADIN) 1 MG tablet, Take 1 mg by mouth Daily., Disp: , Rfl: 2    Allergies   Allergen Reactions   • Morphine Nausea Only and Hives   • Penicillin V Potassium Rash   • Sulfa Antibiotics Rash   • Levaquin [Levofloxacin] Nausea Only and Dizziness     When taken with Coumadin   • Amoxicillin Rash   • Norco [Hydrocodone-Acetaminophen] Rash       Family History   Problem Relation Age of Onset   • Hypertension Mother    • Cancer Father    • Hypertension Father    • Hypertension Sister    • Hypertension Brother    • Stroke Brother        Cancer-related family history includes Cancer in her father.    Social History     Tobacco Use   • Smoking status: Never Smoker   • Smokeless tobacco: Never Used   Substance Use Topics   • Alcohol use: No     Frequency: Never     Comment: caffeine 32-64oz qd   • Drug use: No       I have reviewed the history of present illness, past medical history, family history, " "social history, lab results, all notes and other records since the patient was last seen on 11/27/2019.    SUBJECTIVE:  The patient is here for a follow up appointment.  Reports that she has felt okay since chemo. Patient states she's been doing well on the chemo regimen she is taking. Patient said that she is having problems with her eyes and says it's like a burning sensation.           REVIEW OF SYSTEMS:  Review of Systems   Constitutional: Negative for chills and fever.   HENT: Negative for ear pain, mouth sores, nosebleeds and sore throat.    Eyes: Negative for photophobia and visual disturbance.        Wears eye glasses   Respiratory: Negative for wheezing and stridor.    Cardiovascular: Negative for chest pain and palpitations.   Gastrointestinal: Negative for abdominal pain, diarrhea, nausea and vomiting.   Endocrine: Negative for cold intolerance and heat intolerance.   Genitourinary: Negative for dysuria and hematuria.   Musculoskeletal: Negative for joint swelling and neck stiffness.   Skin: Negative for color change and rash.   Neurological: Negative for seizures and syncope.   Hematological: Negative for adenopathy.        No obvious bleeding   Psychiatric/Behavioral: Negative for agitation, confusion and hallucinations.       OBJECTIVE:    Vitals:    02/03/20 1411   BP: 103/68   Pulse: 58   Resp: 18   Temp: 98 °F (36.7 °C)   Weight: 83.6 kg (184 lb 3.2 oz)   Height: 165.1 cm (65\")   PainSc: 0-No pain       ECOG  (1) Restricted in physically strenuous activity, ambulatory and able to do work of light nature    Physical Exam   Constitutional: She is oriented to person, place, and time. No distress.   HENT:   Head: Normocephalic and atraumatic.   Eyes: Conjunctivae and EOM are normal. Right eye exhibits no discharge. Left eye exhibits no discharge. No scleral icterus.   Neck: Normal range of motion. Neck supple. No thyromegaly present.   Cardiovascular: Normal rate, regular rhythm and normal heart sounds. " Exam reveals no gallop and no friction rub.   Pulmonary/Chest: Effort normal. No stridor. No respiratory distress. She has no wheezes.   Left chest wall port   Abdominal: Soft. Bowel sounds are normal. She exhibits no mass. There is no tenderness. There is no rebound and no guarding.   Musculoskeletal: Normal range of motion. She exhibits no tenderness.   Lymphadenopathy:     She has no cervical adenopathy.   Neurological: She is alert and oriented to person, place, and time. She exhibits normal muscle tone.   Skin: Skin is warm. No rash noted. She is not diaphoretic. No erythema.   Psychiatric: She has a normal mood and affect. Her behavior is normal.   Nursing note and vitals reviewed.      RECENT LABS  WBC   Date Value Ref Range Status   02/03/2020 3.83 3.40 - 10.80 10*3/mm3 Final     RBC   Date Value Ref Range Status   02/03/2020 2.79 (L) 3.77 - 5.28 10*6/mm3 Final     Hemoglobin   Date Value Ref Range Status   02/03/2020 9.4 (L) 12.0 - 15.9 g/dL Final     Hematocrit   Date Value Ref Range Status   02/03/2020 29.7 (L) 34.0 - 46.6 % Final     MCV   Date Value Ref Range Status   02/03/2020 106.5 (H) 79.0 - 97.0 fL Final     MCH   Date Value Ref Range Status   02/03/2020 33.7 (H) 26.6 - 33.0 pg Final     MCHC   Date Value Ref Range Status   02/03/2020 31.6 31.5 - 35.7 g/dL Final     RDW   Date Value Ref Range Status   02/03/2020 14.3 12.3 - 15.4 % Final     RDW-SD   Date Value Ref Range Status   02/03/2020 53.8 37.0 - 54.0 fl Final     MPV   Date Value Ref Range Status   02/03/2020 11.1 6.0 - 12.0 fL Final     Platelets   Date Value Ref Range Status   02/03/2020 52 (L) 140 - 450 10*3/mm3 Final     Neutrophil %   Date Value Ref Range Status   02/03/2020 53.7 42.7 - 76.0 % Final     Lymphocyte %   Date Value Ref Range Status   02/03/2020 37.9 19.6 - 45.3 % Final     Monocyte %   Date Value Ref Range Status   02/03/2020 5.5 5.0 - 12.0 % Final     Eosinophil %   Date Value Ref Range Status   02/03/2020 2.6 0.3 - 6.2 %  Final     Basophil %   Date Value Ref Range Status   02/03/2020 0.3 0.0 - 1.5 % Final     Neutrophils, Absolute   Date Value Ref Range Status   02/03/2020 2.06 1.70 - 7.00 10*3/mm3 Final     Lymphocytes, Absolute   Date Value Ref Range Status   02/03/2020 1.45 0.70 - 3.10 10*3/mm3 Final     Monocytes, Absolute   Date Value Ref Range Status   02/03/2020 0.21 0.10 - 0.90 10*3/mm3 Final     Eosinophils, Absolute   Date Value Ref Range Status   02/03/2020 0.10 0.00 - 0.40 10*3/mm3 Final     Basophils, Absolute   Date Value Ref Range Status   02/03/2020 0.01 0.00 - 0.20 10*3/mm3 Final     nRBC   Date Value Ref Range Status   12/11/2019 0.3 (H) 0.0 - 0.2 /100 WBC Final       Lab Results   Component Value Date    GLUCOSE 115 (H) 01/24/2020    BUN 10 01/24/2020    CREATININE 0.90 01/24/2020    EGFRIFNONA 70 01/24/2020    EGFRIFAFRI >60 06/07/2019    BCR 12.2 01/24/2020    K 3.5 01/24/2020    CO2 28.0 01/24/2020    CALCIUM 9.4 01/24/2020    ALBUMIN 4.00 01/24/2020    LABIL2 1.5 06/07/2019    AST 22 01/24/2020    ALT 11 01/24/2020         Assessment/Plan     Malignant neoplasm of right ovary (CMS/HCC)  -       ASSESSMENT:    1. Recurrent ovarian cancer on carboplatinum, Doxil.  Patient had had carboplatinum Taxol, carbo Taxotere, Gemzar single agent, Niraparib and currently on Doxil and carboplatinum.  Refer to paradigm testing for future options at time of progression  2. Iron deficiency anemia  3. Pancytopenia: Due to chemotherapy  4. Hypomagnesemia  5. B12 deficiency on parenteral B12 replacement  6. Monitoring for chemotherapy toxicities    The patient claims to be tolerating chemotherapy well and her scans are stable except for a new 2 mm nodule in the left lower lobe of the lung.  Have made her aware that if her tumors continue to shrink that we will continue her on the same treatment but if they stop shrinking, then we will stop the current treatment and observe her.  Her tumor markers have mostly been in the  normal range.  She is taking 3 magnesium pills daily and in addition getting weekly IV replacement as needed.  She is not taking any oral iron as she does not absorb it.  On  Coumadin, her INR is 2.9 today.  She is to continue herself on vitamin B12 injections to switch to treatment at the cancer center.    PLANS:     Continue chemotherapy.   today  Check restaging CT scans of the chest, abdomen and pelvis first week in February. Will order today  Continue magnesium oxide 400 mg three times a day and weekly IV replacement as needed.   Continue Coumadin  Continue b12 injections IM monthly. Transitioned injections to be done at the cancer center  Continue Retacrit 40,000 units subcu weekly for hemoglobin less than 10   Consider discontinuing Doxil due to approaching lifetime dosing       I have reviewed labs results, imaging, vitals, and medications with the patient today. Will follow up in 1 month with me.    Patient verbalized understanding and is in agreement of the above plan.    Part of this document was scribed by Mallory Connors RN, BSN.      I spent greater than 40 minutes in face-to-face time with the patient and 95% of that time were spent in counseling and coordination of care, including review of imaging and pathology; indications for treatment, goals of therapy, alternatives and risks - both common and rare - as well as surveillance and potential outcomes.

## 2020-02-04 ENCOUNTER — TELEPHONE (OUTPATIENT)
Dept: ONCOLOGY | Facility: CLINIC | Age: 65
End: 2020-02-04

## 2020-02-04 NOTE — TELEPHONE ENCOUNTER
Called [facility] and they were able to schedule the appointment. I was then able to contact the Pt, and she is aware of the appointment time and day. Also gave location address and description of where its at

## 2020-02-07 ENCOUNTER — HOSPITAL ENCOUNTER (OUTPATIENT)
Dept: ONCOLOGY | Facility: HOSPITAL | Age: 65
Discharge: HOME | End: 2020-02-07
Admitting: NURSE PRACTITIONER

## 2020-02-07 ENCOUNTER — TRANSCRIBE ORDERS (OUTPATIENT)
Dept: ADMINISTRATIVE | Facility: HOSPITAL | Age: 65
End: 2020-02-07

## 2020-02-07 ENCOUNTER — HOSPITAL ENCOUNTER (OUTPATIENT)
Dept: ONCOLOGY | Facility: HOSPITAL | Age: 65
Setting detail: INFUSION SERIES
Discharge: HOME | End: 2020-02-07

## 2020-02-07 ENCOUNTER — LAB (OUTPATIENT)
Dept: LAB | Facility: HOSPITAL | Age: 65
End: 2020-02-07

## 2020-02-07 VITALS
DIASTOLIC BLOOD PRESSURE: 64 MMHG | SYSTOLIC BLOOD PRESSURE: 103 MMHG | HEART RATE: 57 BPM | RESPIRATION RATE: 16 BRPM | WEIGHT: 181 LBS | BODY MASS INDEX: 30.12 KG/M2 | TEMPERATURE: 98 F

## 2020-02-07 DIAGNOSIS — E83.42 HYPOMAGNESEMIA: Primary | ICD-10-CM

## 2020-02-07 DIAGNOSIS — I82.722 CHRONIC DEEP VEIN THROMBOSIS (DVT) OF LEFT UPPER EXTREMITY, UNSPECIFIED VEIN (HCC): ICD-10-CM

## 2020-02-07 DIAGNOSIS — R51.9 NONINTRACTABLE HEADACHE, UNSPECIFIED CHRONICITY PATTERN, UNSPECIFIED HEADACHE TYPE: ICD-10-CM

## 2020-02-07 DIAGNOSIS — Z79.01 LONG TERM (CURRENT) USE OF ANTICOAGULANTS: Primary | ICD-10-CM

## 2020-02-07 DIAGNOSIS — C56.1 MALIGNANT NEOPLASM OF RIGHT OVARY (HCC): ICD-10-CM

## 2020-02-07 DIAGNOSIS — R51.9 NONINTRACTABLE HEADACHE, UNSPECIFIED CHRONICITY PATTERN, UNSPECIFIED HEADACHE TYPE: Primary | ICD-10-CM

## 2020-02-07 LAB
CRP SERPL-MCNC: 0.35 MG/DL (ref 0–0.5)
ERYTHROCYTE [SEDIMENTATION RATE] IN BLOOD: 53 MM/HR (ref 0–30)
INR PPP: 1.7

## 2020-02-07 PROCEDURE — 25010000003 HEPARIN LOCK FLUCH PER 10 UNITS: Performed by: INTERNAL MEDICINE

## 2020-02-07 PROCEDURE — 86140 C-REACTIVE PROTEIN: CPT

## 2020-02-07 PROCEDURE — 85652 RBC SED RATE AUTOMATED: CPT

## 2020-02-07 PROCEDURE — 25010000002 MAGNESIUM SULFATE 2 GM/50ML SOLUTION: Performed by: NURSE PRACTITIONER

## 2020-02-07 PROCEDURE — 96365 THER/PROPH/DIAG IV INF INIT: CPT

## 2020-02-07 PROCEDURE — 36415 COLL VENOUS BLD VENIPUNCTURE: CPT

## 2020-02-07 PROCEDURE — 85610 PROTHROMBIN TIME: CPT

## 2020-02-07 RX ORDER — SODIUM CHLORIDE 0.9 % (FLUSH) 0.9 %
10 SYRINGE (ML) INJECTION AS NEEDED
Status: CANCELLED | OUTPATIENT
Start: 2020-02-07

## 2020-02-07 RX ORDER — SODIUM CHLORIDE 0.9 % (FLUSH) 0.9 %
10 SYRINGE (ML) INJECTION AS NEEDED
Status: DISCONTINUED | OUTPATIENT
Start: 2020-02-07 | End: 2020-02-08 | Stop reason: HOSPADM

## 2020-02-07 RX ORDER — HEPARIN SODIUM (PORCINE) LOCK FLUSH IV SOLN 100 UNIT/ML 100 UNIT/ML
500 SOLUTION INTRAVENOUS AS NEEDED
Status: DISCONTINUED | OUTPATIENT
Start: 2020-02-07 | End: 2020-02-08 | Stop reason: HOSPADM

## 2020-02-07 RX ORDER — HEPARIN SODIUM (PORCINE) LOCK FLUSH IV SOLN 100 UNIT/ML 100 UNIT/ML
500 SOLUTION INTRAVENOUS AS NEEDED
Status: CANCELLED | OUTPATIENT
Start: 2020-02-07

## 2020-02-07 RX ORDER — MAGNESIUM SULFATE HEPTAHYDRATE 40 MG/ML
2 INJECTION, SOLUTION INTRAVENOUS ONCE
Status: COMPLETED | OUTPATIENT
Start: 2020-02-07 | End: 2020-02-07

## 2020-02-07 RX ORDER — SODIUM CHLORIDE 9 MG/ML
250 INJECTION, SOLUTION INTRAVENOUS ONCE
Status: DISCONTINUED | OUTPATIENT
Start: 2020-02-07 | End: 2020-02-08 | Stop reason: HOSPADM

## 2020-02-07 RX ADMIN — HEPARIN 500 UNITS: 100 SYRINGE at 09:59

## 2020-02-07 RX ADMIN — Medication 10 ML: at 09:57

## 2020-02-07 RX ADMIN — MAGNESIUM SULFATE HEPTAHYDRATE 2 G: 40 INJECTION, SOLUTION INTRAVENOUS at 08:57

## 2020-02-14 ENCOUNTER — HOSPITAL ENCOUNTER (OUTPATIENT)
Dept: ONCOLOGY | Facility: HOSPITAL | Age: 65
Setting detail: INFUSION SERIES
Discharge: HOME | End: 2020-02-14

## 2020-02-14 ENCOUNTER — HOSPITAL ENCOUNTER (OUTPATIENT)
Dept: ONCOLOGY | Facility: HOSPITAL | Age: 65
Discharge: HOME | End: 2020-02-14
Admitting: NURSE PRACTITIONER

## 2020-02-14 ENCOUNTER — LAB (OUTPATIENT)
Dept: LAB | Facility: HOSPITAL | Age: 65
End: 2020-02-14

## 2020-02-14 VITALS
HEART RATE: 55 BPM | SYSTOLIC BLOOD PRESSURE: 111 MMHG | HEIGHT: 65 IN | TEMPERATURE: 97.7 F | DIASTOLIC BLOOD PRESSURE: 75 MMHG | BODY MASS INDEX: 30.32 KG/M2 | WEIGHT: 182 LBS | RESPIRATION RATE: 18 BRPM

## 2020-02-14 DIAGNOSIS — C56.1 MALIGNANT NEOPLASM OF RIGHT OVARY (HCC): ICD-10-CM

## 2020-02-14 DIAGNOSIS — Z79.01 LONG TERM (CURRENT) USE OF ANTICOAGULANTS: Primary | ICD-10-CM

## 2020-02-14 DIAGNOSIS — E83.42 HYPOMAGNESEMIA: Primary | ICD-10-CM

## 2020-02-14 DIAGNOSIS — D61.810 PANCYTOPENIA DUE TO ANTINEOPLASTIC CHEMOTHERAPY (HCC): ICD-10-CM

## 2020-02-14 DIAGNOSIS — D64.81 ANTINEOPLASTIC CHEMOTHERAPY INDUCED ANEMIA: ICD-10-CM

## 2020-02-14 DIAGNOSIS — T45.1X5A ANTINEOPLASTIC CHEMOTHERAPY INDUCED ANEMIA: ICD-10-CM

## 2020-02-14 DIAGNOSIS — T45.1X5A PANCYTOPENIA DUE TO ANTINEOPLASTIC CHEMOTHERAPY (HCC): ICD-10-CM

## 2020-02-14 DIAGNOSIS — I82.722 CHRONIC DEEP VEIN THROMBOSIS (DVT) OF LEFT UPPER EXTREMITY, UNSPECIFIED VEIN (HCC): ICD-10-CM

## 2020-02-14 LAB
BASOPHILS # BLD AUTO: 0.01 10*3/MM3 (ref 0–0.2)
BASOPHILS NFR BLD AUTO: 0.3 % (ref 0–1.5)
DEPRECATED RDW RBC AUTO: 57.8 FL (ref 37–54)
EOSINOPHIL # BLD AUTO: 0.1 10*3/MM3 (ref 0–0.4)
EOSINOPHIL NFR BLD AUTO: 2.9 % (ref 0.3–6.2)
ERYTHROCYTE [DISTWIDTH] IN BLOOD BY AUTOMATED COUNT: 15.3 % (ref 12.3–15.4)
HCT VFR BLD AUTO: 30 % (ref 34–46.6)
HGB BLD-MCNC: 9.6 G/DL (ref 12–15.9)
INR PPP: 1.7
LYMPHOCYTES # BLD AUTO: 1.2 10*3/MM3 (ref 0.7–3.1)
LYMPHOCYTES NFR BLD AUTO: 34.6 % (ref 19.6–45.3)
MAGNESIUM SERPL-MCNC: 1.4 MG/DL (ref 1.6–2.4)
MCH RBC QN AUTO: 34 PG (ref 26.6–33)
MCHC RBC AUTO-ENTMCNC: 32 G/DL (ref 31.5–35.7)
MCV RBC AUTO: 106.4 FL (ref 79–97)
MONOCYTES # BLD AUTO: 0.8 10*3/MM3 (ref 0.1–0.9)
MONOCYTES NFR BLD AUTO: 23.1 % (ref 5–12)
NEUTROPHILS # BLD AUTO: 1.36 10*3/MM3 (ref 1.7–7)
NEUTROPHILS NFR BLD AUTO: 39.1 % (ref 42.7–76)
PLATELET # BLD AUTO: 86 10*3/MM3 (ref 140–450)
PMV BLD AUTO: 11.2 FL (ref 6–12)
RBC # BLD AUTO: 2.82 10*6/MM3 (ref 3.77–5.28)
WBC NRBC COR # BLD: 3.47 10*3/MM3 (ref 3.4–10.8)

## 2020-02-14 PROCEDURE — 85610 PROTHROMBIN TIME: CPT

## 2020-02-14 PROCEDURE — G0463 HOSPITAL OUTPT CLINIC VISIT: HCPCS

## 2020-02-14 PROCEDURE — 25010000002 MAGNESIUM SULFATE 2 GM/50ML SOLUTION: Performed by: NURSE PRACTITIONER

## 2020-02-14 PROCEDURE — 85025 COMPLETE CBC W/AUTO DIFF WBC: CPT | Performed by: NURSE PRACTITIONER

## 2020-02-14 PROCEDURE — 96365 THER/PROPH/DIAG IV INF INIT: CPT

## 2020-02-14 PROCEDURE — 96372 THER/PROPH/DIAG INJ SC/IM: CPT

## 2020-02-14 PROCEDURE — 25010000002 EPOETIN ALFA-EPBX 40000 UNIT/ML SOLUTION: Performed by: NURSE PRACTITIONER

## 2020-02-14 PROCEDURE — 83735 ASSAY OF MAGNESIUM: CPT | Performed by: NURSE PRACTITIONER

## 2020-02-14 PROCEDURE — 36415 COLL VENOUS BLD VENIPUNCTURE: CPT

## 2020-02-14 PROCEDURE — 25010000003 HEPARIN LOCK FLUCH PER 10 UNITS: Performed by: INTERNAL MEDICINE

## 2020-02-14 RX ORDER — SODIUM CHLORIDE 0.9 % (FLUSH) 0.9 %
10 SYRINGE (ML) INJECTION AS NEEDED
Status: CANCELLED | OUTPATIENT
Start: 2020-02-14

## 2020-02-14 RX ORDER — MAGNESIUM SULFATE HEPTAHYDRATE 40 MG/ML
2 INJECTION, SOLUTION INTRAVENOUS ONCE
Status: COMPLETED | OUTPATIENT
Start: 2020-02-14 | End: 2020-02-14

## 2020-02-14 RX ORDER — SODIUM CHLORIDE 0.9 % (FLUSH) 0.9 %
10 SYRINGE (ML) INJECTION AS NEEDED
Status: DISCONTINUED | OUTPATIENT
Start: 2020-02-14 | End: 2020-02-15 | Stop reason: HOSPADM

## 2020-02-14 RX ORDER — HEPARIN SODIUM (PORCINE) LOCK FLUSH IV SOLN 100 UNIT/ML 100 UNIT/ML
500 SOLUTION INTRAVENOUS AS NEEDED
Status: DISCONTINUED | OUTPATIENT
Start: 2020-02-14 | End: 2020-02-15 | Stop reason: HOSPADM

## 2020-02-14 RX ORDER — HEPARIN SODIUM (PORCINE) LOCK FLUSH IV SOLN 100 UNIT/ML 100 UNIT/ML
500 SOLUTION INTRAVENOUS AS NEEDED
Status: CANCELLED | OUTPATIENT
Start: 2020-02-14

## 2020-02-14 RX ADMIN — EPOETIN ALFA-EPBX 40000 UNITS: 40000 INJECTION, SOLUTION INTRAVENOUS; SUBCUTANEOUS at 09:54

## 2020-02-14 RX ADMIN — HEPARIN 500 UNITS: 100 SYRINGE at 10:19

## 2020-02-14 RX ADMIN — Medication 10 ML: at 08:44

## 2020-02-14 RX ADMIN — MAGNESIUM SULFATE HEPTAHYDRATE 2 G: 40 INJECTION, SOLUTION INTRAVENOUS at 09:11

## 2020-02-21 ENCOUNTER — HOSPITAL ENCOUNTER (OUTPATIENT)
Dept: ONCOLOGY | Facility: HOSPITAL | Age: 65
Setting detail: INFUSION SERIES
Discharge: HOME | End: 2020-02-21

## 2020-02-21 ENCOUNTER — LAB (OUTPATIENT)
Dept: LAB | Facility: HOSPITAL | Age: 65
End: 2020-02-21

## 2020-02-21 ENCOUNTER — HOSPITAL ENCOUNTER (OUTPATIENT)
Dept: ONCOLOGY | Facility: HOSPITAL | Age: 65
Discharge: HOME | End: 2020-02-21
Admitting: NURSE PRACTITIONER

## 2020-02-21 VITALS — WEIGHT: 183 LBS | RESPIRATION RATE: 18 BRPM | BODY MASS INDEX: 30.49 KG/M2 | HEIGHT: 65 IN

## 2020-02-21 VITALS
SYSTOLIC BLOOD PRESSURE: 129 MMHG | DIASTOLIC BLOOD PRESSURE: 79 MMHG | BODY MASS INDEX: 30.49 KG/M2 | TEMPERATURE: 97.8 F | RESPIRATION RATE: 16 BRPM | HEART RATE: 54 BPM | WEIGHT: 183 LBS | HEIGHT: 65 IN

## 2020-02-21 DIAGNOSIS — C56.1 MALIGNANT NEOPLASM OF RIGHT OVARY (HCC): ICD-10-CM

## 2020-02-21 DIAGNOSIS — I82.722 CHRONIC DEEP VEIN THROMBOSIS (DVT) OF LEFT UPPER EXTREMITY, UNSPECIFIED VEIN (HCC): ICD-10-CM

## 2020-02-21 DIAGNOSIS — C56.1 MALIGNANT NEOPLASM OF RIGHT OVARY (HCC): Primary | ICD-10-CM

## 2020-02-21 DIAGNOSIS — Z51.11 ENCOUNTER FOR ANTINEOPLASTIC CHEMOTHERAPY: ICD-10-CM

## 2020-02-21 DIAGNOSIS — E83.42 HYPOMAGNESEMIA: ICD-10-CM

## 2020-02-21 DIAGNOSIS — Z79.01 LONG TERM (CURRENT) USE OF ANTICOAGULANTS: Primary | ICD-10-CM

## 2020-02-21 LAB
ALBUMIN SERPL-MCNC: 4 G/DL (ref 3.5–5.2)
ALBUMIN/GLOB SERPL: 1.6 G/DL
ALP SERPL-CCNC: 124 U/L (ref 39–117)
ALT SERPL W P-5'-P-CCNC: 10 U/L (ref 1–33)
ANION GAP SERPL CALCULATED.3IONS-SCNC: 10 MMOL/L (ref 5–15)
AST SERPL-CCNC: 23 U/L (ref 1–32)
BASOPHILS # BLD AUTO: 0.02 10*3/MM3 (ref 0–0.2)
BASOPHILS NFR BLD AUTO: 0.6 % (ref 0–1.5)
BILIRUB SERPL-MCNC: 0.2 MG/DL (ref 0.2–1.2)
BUN BLD-MCNC: 15 MG/DL (ref 8–23)
BUN/CREAT SERPL: 18.5 (ref 7–25)
CALCIUM SPEC-SCNC: 9.4 MG/DL (ref 8.6–10.5)
CHLORIDE SERPL-SCNC: 105 MMOL/L (ref 98–107)
CO2 SERPL-SCNC: 27 MMOL/L (ref 22–29)
CREAT BLD-MCNC: 0.81 MG/DL (ref 0.57–1)
CREAT BLDA-MCNC: 0.9 MG/DL (ref 0.6–1.3)
DEPRECATED RDW RBC AUTO: 63.6 FL (ref 37–54)
EOSINOPHIL # BLD AUTO: 0.05 10*3/MM3 (ref 0–0.4)
EOSINOPHIL NFR BLD AUTO: 1.4 % (ref 0.3–6.2)
ERYTHROCYTE [DISTWIDTH] IN BLOOD BY AUTOMATED COUNT: 16.5 % (ref 12.3–15.4)
GFR SERPL CREATININE-BSD FRML MDRD: 71 ML/MIN/1.73
GLOBULIN UR ELPH-MCNC: 2.5 GM/DL
GLUCOSE BLD-MCNC: 77 MG/DL (ref 65–99)
HCT VFR BLD AUTO: 32.9 % (ref 34–46.6)
HGB BLD-MCNC: 10.4 G/DL (ref 12–15.9)
INR PPP: 2.6
LYMPHOCYTES # BLD AUTO: 1.03 10*3/MM3 (ref 0.7–3.1)
LYMPHOCYTES NFR BLD AUTO: 28.4 % (ref 19.6–45.3)
MAGNESIUM SERPL-MCNC: 1.5 MG/DL (ref 1.6–2.4)
MCH RBC QN AUTO: 34.2 PG (ref 26.6–33)
MCHC RBC AUTO-ENTMCNC: 31.6 G/DL (ref 31.5–35.7)
MCV RBC AUTO: 108.2 FL (ref 79–97)
MONOCYTES # BLD AUTO: 0.84 10*3/MM3 (ref 0.1–0.9)
MONOCYTES NFR BLD AUTO: 23.1 % (ref 5–12)
NEUTROPHILS # BLD AUTO: 1.69 10*3/MM3 (ref 1.7–7)
NEUTROPHILS NFR BLD AUTO: 46.5 % (ref 42.7–76)
PLATELET # BLD AUTO: 164 10*3/MM3 (ref 140–450)
PMV BLD AUTO: 10.2 FL (ref 6–12)
POTASSIUM BLD-SCNC: 4.3 MMOL/L (ref 3.5–5.2)
PROT SERPL-MCNC: 6.5 G/DL (ref 6–8.5)
RBC # BLD AUTO: 3.04 10*6/MM3 (ref 3.77–5.28)
SODIUM BLD-SCNC: 142 MMOL/L (ref 136–145)
WBC NRBC COR # BLD: 3.63 10*3/MM3 (ref 3.4–10.8)

## 2020-02-21 PROCEDURE — 96367 TX/PROPH/DG ADDL SEQ IV INF: CPT

## 2020-02-21 PROCEDURE — 25010000002 PEGFILGRASTIM 6 MG/0.6ML PREFILLED SYRINGE KIT: Performed by: NURSE PRACTITIONER

## 2020-02-21 PROCEDURE — 82565 ASSAY OF CREATININE: CPT

## 2020-02-21 PROCEDURE — 25010000002 FOSAPREPITANT PER 1 MG: Performed by: NURSE PRACTITIONER

## 2020-02-21 PROCEDURE — 96413 CHEMO IV INFUSION 1 HR: CPT

## 2020-02-21 PROCEDURE — 25010000002 CARBOPLATIN PER 50 MG: Performed by: NURSE PRACTITIONER

## 2020-02-21 PROCEDURE — 83735 ASSAY OF MAGNESIUM: CPT | Performed by: NURSE PRACTITIONER

## 2020-02-21 PROCEDURE — 96375 TX/PRO/DX INJ NEW DRUG ADDON: CPT

## 2020-02-21 PROCEDURE — 85610 PROTHROMBIN TIME: CPT

## 2020-02-21 PROCEDURE — 25010000002 DEXAMETHASONE SODIUM PHOSPHATE 120 MG/30ML SOLUTION: Performed by: NURSE PRACTITIONER

## 2020-02-21 PROCEDURE — 36416 COLLJ CAPILLARY BLOOD SPEC: CPT

## 2020-02-21 PROCEDURE — 25010000003 HEPARIN LOCK FLUCH PER 10 UNITS: Performed by: INTERNAL MEDICINE

## 2020-02-21 PROCEDURE — 80053 COMPREHEN METABOLIC PANEL: CPT | Performed by: NURSE PRACTITIONER

## 2020-02-21 PROCEDURE — 25010000002 PALONOSETRON 0.25 MG/5ML SOLUTION PREFILLED SYRINGE: Performed by: NURSE PRACTITIONER

## 2020-02-21 PROCEDURE — 96417 CHEMO IV INFUS EACH ADDL SEQ: CPT

## 2020-02-21 PROCEDURE — 25010000002 DOXORUBICIN LIPOSOMAL PER 10 MG: Performed by: NURSE PRACTITIONER

## 2020-02-21 PROCEDURE — 96377 APPLICATON ON-BODY INJECTOR: CPT

## 2020-02-21 PROCEDURE — 85025 COMPLETE CBC W/AUTO DIFF WBC: CPT | Performed by: NURSE PRACTITIONER

## 2020-02-21 PROCEDURE — 36591 DRAW BLOOD OFF VENOUS DEVICE: CPT

## 2020-02-21 PROCEDURE — 25010000002 MAGNESIUM SULFATE 2 GM/50ML SOLUTION: Performed by: INTERNAL MEDICINE

## 2020-02-21 RX ORDER — FAMOTIDINE 10 MG/ML
20 INJECTION, SOLUTION INTRAVENOUS AS NEEDED
Status: CANCELLED | OUTPATIENT
Start: 2020-02-21

## 2020-02-21 RX ORDER — SODIUM CHLORIDE 0.9 % (FLUSH) 0.9 %
10 SYRINGE (ML) INJECTION AS NEEDED
Status: DISCONTINUED | OUTPATIENT
Start: 2020-02-21 | End: 2020-02-22 | Stop reason: HOSPADM

## 2020-02-21 RX ORDER — SODIUM CHLORIDE 0.9 % (FLUSH) 0.9 %
10 SYRINGE (ML) INJECTION AS NEEDED
Status: CANCELLED | OUTPATIENT
Start: 2020-02-21

## 2020-02-21 RX ORDER — DEXTROSE MONOHYDRATE 50 MG/ML
250 INJECTION, SOLUTION INTRAVENOUS ONCE
Status: CANCELLED | OUTPATIENT
Start: 2020-02-21

## 2020-02-21 RX ORDER — PALONOSETRON 0.05 MG/ML
0.25 INJECTION, SOLUTION INTRAVENOUS ONCE
Status: COMPLETED | OUTPATIENT
Start: 2020-02-21 | End: 2020-02-21

## 2020-02-21 RX ORDER — DEXTROSE MONOHYDRATE 50 MG/ML
250 INJECTION, SOLUTION INTRAVENOUS ONCE
Status: DISCONTINUED | OUTPATIENT
Start: 2020-02-21 | End: 2020-02-22 | Stop reason: HOSPADM

## 2020-02-21 RX ORDER — HEPARIN SODIUM (PORCINE) LOCK FLUSH IV SOLN 100 UNIT/ML 100 UNIT/ML
500 SOLUTION INTRAVENOUS AS NEEDED
Status: DISCONTINUED | OUTPATIENT
Start: 2020-02-21 | End: 2020-02-22 | Stop reason: HOSPADM

## 2020-02-21 RX ORDER — MAGNESIUM SULFATE HEPTAHYDRATE 40 MG/ML
2 INJECTION, SOLUTION INTRAVENOUS ONCE
Status: COMPLETED | OUTPATIENT
Start: 2020-02-21 | End: 2020-02-21

## 2020-02-21 RX ORDER — SODIUM CHLORIDE 9 MG/ML
250 INJECTION, SOLUTION INTRAVENOUS ONCE
Status: COMPLETED | OUTPATIENT
Start: 2020-02-21 | End: 2020-02-21

## 2020-02-21 RX ORDER — DIPHENHYDRAMINE HYDROCHLORIDE 50 MG/ML
50 INJECTION INTRAMUSCULAR; INTRAVENOUS AS NEEDED
Status: CANCELLED | OUTPATIENT
Start: 2020-02-21

## 2020-02-21 RX ORDER — PALONOSETRON 0.05 MG/ML
0.25 INJECTION, SOLUTION INTRAVENOUS ONCE
Status: CANCELLED | OUTPATIENT
Start: 2020-02-21

## 2020-02-21 RX ORDER — HEPARIN SODIUM (PORCINE) LOCK FLUSH IV SOLN 100 UNIT/ML 100 UNIT/ML
500 SOLUTION INTRAVENOUS AS NEEDED
Status: CANCELLED | OUTPATIENT
Start: 2020-02-21

## 2020-02-21 RX ADMIN — HEPARIN 500 UNITS: 100 SYRINGE at 13:06

## 2020-02-21 RX ADMIN — SODIUM CHLORIDE 100 ML: 900 INJECTION, SOLUTION INTRAVENOUS at 10:03

## 2020-02-21 RX ADMIN — MAGNESIUM SULFATE HEPTAHYDRATE 2 G: 40 INJECTION, SOLUTION INTRAVENOUS at 08:55

## 2020-02-21 RX ADMIN — DEXAMETHASONE SODIUM PHOSPHATE 8 MG: 4 INJECTION, SOLUTION INTRA-ARTICULAR; INTRALESIONAL; INTRAMUSCULAR; INTRAVENOUS; SOFT TISSUE at 10:38

## 2020-02-21 RX ADMIN — CARBOPLATIN 540 MG: 10 INJECTION, SOLUTION INTRAVENOUS at 12:18

## 2020-02-21 RX ADMIN — PEGFILGRASTIM 6 MG: KIT SUBCUTANEOUS at 13:08

## 2020-02-21 RX ADMIN — SODIUM CHLORIDE 250 ML: 900 INJECTION, SOLUTION INTRAVENOUS at 08:55

## 2020-02-21 RX ADMIN — PALONOSETRON 0.25 MG: 0.25 INJECTION, SOLUTION INTRAVENOUS at 10:01

## 2020-02-21 RX ADMIN — DOXORUBICIN HYDROCHLORIDE 56 MG: 2 INJECTABLE, LIPOSOMAL INTRAVENOUS at 11:07

## 2020-02-21 RX ADMIN — Medication 10 ML: at 13:05

## 2020-02-24 ENCOUNTER — TELEPHONE (OUTPATIENT)
Dept: ONCOLOGY | Facility: CLINIC | Age: 65
End: 2020-02-24

## 2020-02-27 ENCOUNTER — APPOINTMENT (OUTPATIENT)
Dept: ONCOLOGY | Facility: HOSPITAL | Age: 65
End: 2020-02-27

## 2020-02-27 ENCOUNTER — HOSPITAL ENCOUNTER (OUTPATIENT)
Dept: ONCOLOGY | Facility: HOSPITAL | Age: 65
Setting detail: INFUSION SERIES
Discharge: HOME | End: 2020-02-27

## 2020-02-27 ENCOUNTER — LAB (OUTPATIENT)
Dept: LAB | Facility: HOSPITAL | Age: 65
End: 2020-02-27

## 2020-02-27 ENCOUNTER — APPOINTMENT (OUTPATIENT)
Dept: LAB | Facility: HOSPITAL | Age: 65
End: 2020-02-27

## 2020-02-27 ENCOUNTER — HOSPITAL ENCOUNTER (OUTPATIENT)
Dept: ONCOLOGY | Facility: HOSPITAL | Age: 65
Discharge: HOME | End: 2020-02-27
Admitting: NURSE PRACTITIONER

## 2020-02-27 ENCOUNTER — HOSPITAL ENCOUNTER (OUTPATIENT)
Dept: ONCOLOGY | Facility: HOSPITAL | Age: 65
Setting detail: INFUSION SERIES
End: 2020-02-27

## 2020-02-27 VITALS
SYSTOLIC BLOOD PRESSURE: 123 MMHG | RESPIRATION RATE: 18 BRPM | HEIGHT: 65 IN | BODY MASS INDEX: 30.16 KG/M2 | WEIGHT: 181 LBS | DIASTOLIC BLOOD PRESSURE: 73 MMHG | HEART RATE: 67 BPM | TEMPERATURE: 99.3 F

## 2020-02-27 DIAGNOSIS — I82.722 CHRONIC DEEP VEIN THROMBOSIS (DVT) OF LEFT UPPER EXTREMITY, UNSPECIFIED VEIN (HCC): ICD-10-CM

## 2020-02-27 DIAGNOSIS — Z79.01 LONG TERM (CURRENT) USE OF ANTICOAGULANTS: Primary | ICD-10-CM

## 2020-02-27 DIAGNOSIS — D51.0 PERNICIOUS ANEMIA: ICD-10-CM

## 2020-02-27 DIAGNOSIS — C56.1 MALIGNANT NEOPLASM OF RIGHT OVARY (HCC): ICD-10-CM

## 2020-02-27 DIAGNOSIS — E83.42 HYPOMAGNESEMIA: Primary | ICD-10-CM

## 2020-02-27 LAB
BASOPHILS # BLD AUTO: 0.01 10*3/MM3 (ref 0–0.2)
BASOPHILS NFR BLD AUTO: 0.1 % (ref 0–1.5)
DEPRECATED RDW RBC AUTO: 60.5 FL (ref 37–54)
EOSINOPHIL # BLD AUTO: 0.04 10*3/MM3 (ref 0–0.4)
EOSINOPHIL NFR BLD AUTO: 0.3 % (ref 0.3–6.2)
ERYTHROCYTE [DISTWIDTH] IN BLOOD BY AUTOMATED COUNT: 16 % (ref 12.3–15.4)
HCT VFR BLD AUTO: 33 % (ref 34–46.6)
HGB BLD-MCNC: 10.9 G/DL (ref 12–15.9)
INR PPP: 3
LYMPHOCYTES # BLD AUTO: 0.91 10*3/MM3 (ref 0.7–3.1)
LYMPHOCYTES NFR BLD AUTO: 7.7 % (ref 19.6–45.3)
MAGNESIUM SERPL-MCNC: 1.4 MG/DL (ref 1.6–2.4)
MCH RBC QN AUTO: 34.6 PG (ref 26.6–33)
MCHC RBC AUTO-ENTMCNC: 33 G/DL (ref 31.5–35.7)
MCV RBC AUTO: 104.8 FL (ref 79–97)
MONOCYTES # BLD AUTO: 1.4 10*3/MM3 (ref 0.1–0.9)
MONOCYTES NFR BLD AUTO: 11.9 % (ref 5–12)
NEUTROPHILS # BLD AUTO: 9.42 10*3/MM3 (ref 1.7–7)
NEUTROPHILS NFR BLD AUTO: 80 % (ref 42.7–76)
PLATELET # BLD AUTO: 138 10*3/MM3 (ref 140–450)
PMV BLD AUTO: 10.9 FL (ref 6–12)
RBC # BLD AUTO: 3.15 10*6/MM3 (ref 3.77–5.28)
WBC NRBC COR # BLD: 11.78 10*3/MM3 (ref 3.4–10.8)

## 2020-02-27 PROCEDURE — 83735 ASSAY OF MAGNESIUM: CPT | Performed by: INTERNAL MEDICINE

## 2020-02-27 PROCEDURE — 25010000003 HEPARIN LOCK FLUCH PER 10 UNITS: Performed by: INTERNAL MEDICINE

## 2020-02-27 PROCEDURE — 96365 THER/PROPH/DIAG IV INF INIT: CPT

## 2020-02-27 PROCEDURE — 36416 COLLJ CAPILLARY BLOOD SPEC: CPT

## 2020-02-27 PROCEDURE — 36591 DRAW BLOOD OFF VENOUS DEVICE: CPT

## 2020-02-27 PROCEDURE — 85025 COMPLETE CBC W/AUTO DIFF WBC: CPT | Performed by: NURSE PRACTITIONER

## 2020-02-27 PROCEDURE — 85610 PROTHROMBIN TIME: CPT

## 2020-02-27 PROCEDURE — 25010000002 MAGNESIUM SULFATE 2 GM/50ML SOLUTION: Performed by: INTERNAL MEDICINE

## 2020-02-27 RX ORDER — ACETAMINOPHEN 325 MG/1
650 TABLET ORAL EVERY 6 HOURS PRN
Status: DISCONTINUED | OUTPATIENT
Start: 2020-02-27 | End: 2020-02-28 | Stop reason: HOSPADM

## 2020-02-27 RX ORDER — SODIUM CHLORIDE 0.9 % (FLUSH) 0.9 %
10 SYRINGE (ML) INJECTION AS NEEDED
Status: CANCELLED | OUTPATIENT
Start: 2020-02-27

## 2020-02-27 RX ORDER — SODIUM CHLORIDE 9 MG/ML
250 INJECTION, SOLUTION INTRAVENOUS ONCE
Status: COMPLETED | OUTPATIENT
Start: 2020-02-27 | End: 2020-02-27

## 2020-02-27 RX ORDER — MAGNESIUM SULFATE HEPTAHYDRATE 40 MG/ML
2 INJECTION, SOLUTION INTRAVENOUS ONCE
Status: COMPLETED | OUTPATIENT
Start: 2020-02-27 | End: 2020-02-27

## 2020-02-27 RX ORDER — HEPARIN SODIUM (PORCINE) LOCK FLUSH IV SOLN 100 UNIT/ML 100 UNIT/ML
500 SOLUTION INTRAVENOUS AS NEEDED
Status: DISCONTINUED | OUTPATIENT
Start: 2020-02-27 | End: 2020-02-28 | Stop reason: HOSPADM

## 2020-02-27 RX ORDER — HEPARIN SODIUM (PORCINE) LOCK FLUSH IV SOLN 100 UNIT/ML 100 UNIT/ML
500 SOLUTION INTRAVENOUS AS NEEDED
Status: CANCELLED | OUTPATIENT
Start: 2020-02-27

## 2020-02-27 RX ORDER — CYANOCOBALAMIN 1000 UG/ML
1000 INJECTION, SOLUTION INTRAMUSCULAR; SUBCUTANEOUS ONCE
Status: DISCONTINUED | OUTPATIENT
Start: 2020-02-27 | End: 2020-02-28 | Stop reason: HOSPADM

## 2020-02-27 RX ORDER — SODIUM CHLORIDE 0.9 % (FLUSH) 0.9 %
10 SYRINGE (ML) INJECTION AS NEEDED
Status: DISCONTINUED | OUTPATIENT
Start: 2020-02-27 | End: 2020-02-28 | Stop reason: HOSPADM

## 2020-02-27 RX ADMIN — SODIUM CHLORIDE 250 ML: 900 INJECTION, SOLUTION INTRAVENOUS at 09:23

## 2020-02-27 RX ADMIN — MAGNESIUM SULFATE HEPTAHYDRATE 2 G: 40 INJECTION, SOLUTION INTRAVENOUS at 09:20

## 2020-02-27 RX ADMIN — ACETAMINOPHEN 650 MG: 325 TABLET ORAL at 10:12

## 2020-02-27 RX ADMIN — Medication 10 ML: at 10:17

## 2020-02-27 RX ADMIN — HEPARIN 500 UNITS: 100 SYRINGE at 10:18

## 2020-02-27 NOTE — PROGRESS NOTES
Pt here for labs and magnesium. Pt running temp 99.3, c/o sinus pressure, drainage, fatigue, and doesn't feel well. Pt given tylenol during visit to help her feel better.

## 2020-02-28 DIAGNOSIS — G25.81 RESTLESS LEG SYNDROME: Primary | ICD-10-CM

## 2020-02-28 RX ORDER — TEMAZEPAM 30 MG/1
CAPSULE ORAL
Qty: 30 CAPSULE | Refills: 1 | Status: SHIPPED | OUTPATIENT
Start: 2020-02-28 | End: 2020-04-27

## 2020-03-03 RX ORDER — FAMOTIDINE 40 MG/1
TABLET, FILM COATED ORAL
Qty: 90 TABLET | Refills: 1 | Status: SHIPPED | OUTPATIENT
Start: 2020-03-03 | End: 2021-01-01 | Stop reason: SDUPTHER

## 2020-03-03 NOTE — PROGRESS NOTES
Hematology/Oncology Outpatient Follow Up    PATIENT NAME:Tahira Zimmerman  :1955  MRN: [id number]  PRIMARY CARE PHYSICIAN: Noemy Queen MD  REFERRING PHYSICIAN: No ref. provider found    Chief Complaint   Patient presents with   • Follow-up     Ovarian cancer   • Follow-up     Monitor drug toxicity        HISTORY OF PRESENT ILLNESS:   1. Recurrent ovarian cancer diagnosis established in 2013.  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  The patient was treated with surgery and then postoperatively with adjuvant chemotherapy consisting of Carboplatin and Taxol.  Six months later, she had recurrence of disease intra-abdominally between the rectum and vagina, which was treated with intraabdominal peritoneal chemotherapy.  She had been free of disease since that time.  She reported feeling a mass in the left side of her abdomen approximately six months ago.  This gradually grew and in early 2013 she had her daughter feel the mass.  The daughter works for Dr. David Rogers and the patient at that time also complained of intermittent rectal bleeding.  Consultation with Dr. David Rogers was obtained.  The patient had a CT scan of the abdomen and pelvis performed on 13 revealing left lower quadrant mass measuring 9.7 x 9.3 x 6 cm demonstrating areas of dense calcification, soft tissue density and cystic density within it.  Stromal tumor is felt to be most likely a possibly fibroma.  It is generated with necrosis and calcification.  Possible palpable mass in the rectus muscle is seen with calcification and deformity of the rectus muscle and just below this a second mass intra-abdominally was seen measuring 2 cm in the greatest diameter suspicious for second intraabdominal tumor.  The mass separate from the largest mass measuring 2.6 cm in the dependent portion of pelvis is seen on the right of the  midline.  No retroperitoneal lymphadenopathy was seen.  No free air, free fluid or other abnormality was present.  The patient was scheduled for an outpatient colonoscopy, but had syncopal episode while sitting in the toilet the night before and was brought to the emergency room early in the morning by her daughter and son-in-law.  The patient had apparently stopped breathing for a few seconds and did not have a pulse, but started breathing before CPR could be initiated.  In the emergency room, the patient had an EKG revealing sinus rhythm nonspecific T-wave flattening; metabolic panel revealed a glucose of 148, creatinine of 1.3, CBC was normal.  She was treated with intravenous fluid.  The patient’s case was then discussed with Dr. Anabell Monique and patient was subsequently admitted to [facility].  The patient underwent a colonoscopy by Dr. David Rogers on 06/27/13 revealing sigmoid diverticulosis and grade II internal and external hemorrhoids, but no mass or colitis was seen.  CT-guided biopsy of the mass was performed on 06/27/13 as well revealing metastatic papillary adenocarcinoma with numerous psammoma bodies.  Pathology comment stated prior records were not available; however, with history of ovarian cancer the current biopsy would be consistent with metastases from that malignancy.  Oncology consult was obtained and I initially saw the patient on 06/27/2013 where the patient had claimed to have little bit of pain in the area of disease.  She reported being frequently constipated, denies any weight loss or fevers.  She did report having some night sweats, but thought that was because of her menopause.  On 06/27/13 metastatic workup including labs and CT scans were initiated.  CT scan of the chest on 06/27/13 revealed no acute disease in the chest.  A 1.4 cm size focal area of decreased density was noted in the lateral aspect of the right lobe of the liver consistent with a benign cyst or  hemangioma.  There was a very small hiatal hernia measuring 2.2 cm in diameter.  A CT scan of the head performed 06/27/13 revealed no acute intracranial abnormality noted.  CA-125 was 40 units/ml (0-35), CA 19-9 was 11.8 units/ml (0-35), CEA level was 0.8 ng/ml (0-3), TSH level was 1.09 IU/ml (0.34-5.6).  The patient was in workup for the syncopal episode, a carotid Doppler was performed on June 28 revealing an essentially normal study showing a reduction in diameter from 0-15% bilaterally.  A Holter monitor was completed on 06/27/13, which was unremarkable except for a few premature atrial contractions.  The patient was felt stable and subsequently was discharged home on 06/28/13.  Post discharge, the patient was seen at [facility] on 07/05/13 by Dr. Kathryn Thrasher who discussed treatment options with the patient including surgery.  The patient is in the office today 07/16/13 in follow up post hospitalization.  She is reporting that surgery is planned for July 30th of this month at .  · 7/30/13 to 8/3/13 - The patient was in [facility].  The patient was admitted for surgery for her recurrent ovarian cancer.  The patient had exploratory laparotomy, omentectomy, bowel resection, liver biopsy and appendectomy.  Pathology revealed of the omentum metastatic serous carcinoma of the transverse colon, segmental resection showed metastatic serous carcinoma involving mesenteric fat.  The liver wedge resection showed fibrosclerotic thickening of the hepatic capsule.  Benign liver parenchyma.  No evidence of metastatic tumor.  Appendix showed fibrous obliteration of the lumen.  Negative for metastatic carcinoma.  Rectum and sigmoid colon segmental resection showed metastatic serous carcinoma invading the colorectal mucosa uninvolved by tumor.  Vaginal mass showed metastatic serous carcinoma.  Margins are positive for tumor.  · 9/24/13 - Chemotherapy orders were written for  patient to start carboplatin 550 mg IV day one and Taxol 330 mg IV day one to be cycled every three weeks.  · 10/10/13 - The patient started cycle #1 of chemotherapy consisting of Carboplatin and Taxol.  · 09/25/13 -  is 10.6 normal.  · 10/01/13 - CT of the chest, abdomen and pelvis revealed new reticular nodular occupancy in the anterior segment of the right upper lobe, could reflect an inflammation or infectious etiology or could reflect unusual persistence of metastatic tumor.  New liver lesions, the smaller one appears to be implant on the surface of the liver, the larger may also represent an implant including the intrahepatic.  Several small mesenteric nodes seen throughout the abdomen and pelvis.  · 10/02/13 - Port placed by Dr. Sen.  · 10/24/13 - Orders written to start Neupogen daily and if more than three Neupogen are needed to give Neulasta after next chemo.  ANC is 0.15.  · 10/31/14 - Patient given cycle 2 of chemotherapy with Taxol and Carboplatin.  · 11/21/13 - The patient started cycle 3 of chemotherapy consisting of Carboplatin and Taxol.  · 11/26/13 - CT scan of chest, abdomen and pelvis revealed no evidence of active disease in the chest.  Reticulonodular opacity has resolved.  Metastatic lesion impressing upon the hepatic dome similar to prior exam.  No evidence of a change.  No evidence of new disease.  Postsurgical changes in the pelvis and throughout the retroperitoneum in the large bowel.  Finding containing anterior abdominal wall hernia containing fat tissue and enhancing vessels, 3 cm in diameter.  · 12/2/13 - Orders written to start Neupogen per protocol as well as Aranesp due to low hemoglobin and ANC.  ANC is 0.14.  Hemoglobin is 9.7.  · 12/11/13 - Orders written to hold chemotherapy until next week and decrease dose by 20% due to low platelet count.  Platelet count is 85,000.  · 12/16/13 - The patient received cycle 4 of Carboplatin and Taxol at a 20% dose reduction so Taxol dose  is 260 mg and Carboplatin is 440 mg.  · 12/16/13 - CA-125 12.6 (N).  · 12/19/13 - PET/CT scan.  Impression:  1. There is no evidence of hypermetabolism in the liver.  Specifically of the dominant lesion in or adjacent to the right hepatic dome that was seen and described on the recent CT study of 11/26/2013.  It is photopenic relative to the surrounding liver.  2.  There is no evidence of hypermetabolic soft issue mass lesion or free fluid in the abdomen or pelvis.  3.  The tonsillar tissues are slightly enlarged and have moderate activity level.  This maybe physiologic.  Correlation with oral cavity, examination is recommended.  4.  Mild amount of activity in the right level II jugular lymph chain without focally enlarged lymph nodes is of questionable significance.  · 1/6/13 - The patient received cycle 5 of chemotherapy with Taxol and Carboplatin.  · 1/27/14 - The patient started on cycle 6 of Taxol and Carboplatin.  · 2/18/14 - CT of the abdomen and pelvis with contrast; stable lesions impressing upon the hepatic dome, unchanged compared to the prior exam.  Multiple anterior abdominal wall hernias re-demonstrated.  Those above the umbilicus contain omental fat.  Below and to the left of the umbilicus as anterior abdominal hernia containing omental fat in nondilated small bowel which is larger than seen previously.  · 2/20/14 - The patient received cycle 7 of chemotherapy with Taxol and Carboplatin.   · 3/11/14 - Order written to discontinue chemotherapy due to patient has completed 7 cycles of chemotherapy and CT scans suggest possible remission.  · 3/11/14 -  11.0 (N).  · 06/05/14 - CT scan of the chest abdomen and pelvis with contrast: There are multiple anterior abdominal wall hernias.  There are 2 larger anterior abdominal wall hernias at the level of the transverse colon, which contain omental fat.  There are at least 2 small anterior abdominal wall hernias that contain omental fat.  There is a  moderate sized anterior abdominal wall hernia at the level of the umbilicus, eccentric to the left, which contain non-dilated bowel.  Interval decrease in the size of two deposit impressing on the liver.  The third tiny deposit has been stable.  · 8/19/14 -  10.4 (N).  · 12/19/14 -   10.0 (N)  · 1/7/15 - CT chest, abdomen and pelvis with stable appearance of the chest with several micronodules. Small hiatal hernia, slightly larger than previous exam, increased from 1.5 to 2.5 cm in diameter. Bochdalek hernia unchanged. Multiple hepatic lesions. The two dominant lesions at the right hepatic dome had decreased in size from previous. The remaining tiny nodules measured 3-5 mm in diameter and were unchanged. There was no evidence of new intra-abdominal or pelvic mass or free fluid. There were multiple anterior abdominal wall hernias which had increased in size.   · 7/27/15 - Comprehensive metabolic panel with no significant abnormalities. CA-125 of 21 (0-35).   · 10/26/15 - WBC 6, hemoglobin 13, platelet count 204,000. Heart rate 47. CA-125 of 20 (0-35).    · 2/18/16 - CT chest with contrast with stable micronodular density seen in the lungs without significant change from prior exam. CT abdomen and pelvis with regression of liver lesions with no new evidence of metastasis. Multiple ventral hernias again noted without significant change from prior exam. Creatinine 0.9 (0.6-1.3).    · 2/25/16 - Patient hospitalized at [facility] between 2/25/16 and 2/27/16 with pyelonephritis secondary to E-coli. She was given two days of IV Rocephin and then placed on oral Levaquin. She was asked to hold her Coumadin, but then had nausea and vomiting because of Levaquin and stopped the Levaquin also. Her CA-125 was 32 (0-35) and CEA 1.3 (0-5). Her urine grew >100,000 E-coli. CT of the abdomen and pelvis was done which revealed 4.1 x 1.8 cm sized mild ovoid area of decreased density in the liver parenchyma along  the anteromedial aspect of the dome of the right lobe of the liver, unchanged significantly since the previous study of 2/18/16. There was suspicion of a very small pericardial effusion. There was suspicion of a small hiatal hernia. There were several hernias in the anterior abdominal wall. A 3.5 x 3.1 cm incised well-circumscribed abnormal density in the left retroperitoneal fatty soft tissue at the level of the left iliac crest was suggestive of tumor recurrence. There was an abnormal density in the left retroperitoneal soft tissues in the left flank that partially surrounded the left kidney and extended downward to the left pelvic area. Diverticulosis of the distal descending colon and sigmoid colon were seen.     · 3/8/16 - Patient prescribed Bactrim DS 1 p.o. b.i.d. for 10 days for pyelonephritis, which was partially treated with Rocephin and Levaquin. Urine with 40,000 E-coli treated with Macrobid for 7 days.  of 20 (0-35).    · 3/31/16 - PET scan with area of low density anteriorly in the right lobe of the liver with no significant radiopharmaceutical uptake to suggest malignancy. Multiple round soft tissue density masses showing increased radiopharmaceutical activity and multiple anterior abdominal wall hernias.   · 5/10/16 - Patient complains of venous enlargement of the left chest wall around the port. Has not been seen by  yet, but has an appointment on 5/23/16. Complained of a cough.   · 5/12/16 - Infusaport contrast study with no evidence of fibrin sheath. Chest x-ray with mild cardiac prominence.   · 5/23/16 - Patient seen in consultation by Sheng Bowman M.D. at [facility] and a chemotherapy trial recommended.   · 6/1/16 -   of 27.1 (0-35).    · 6/14/16 - WBC 5.71, hemoglobin 12.4, platelet count 188,000. Patient is scheduled for surgery at  on 6/16/16 after further discussion with  physicians. Discussed adjuvant chemotherapy.   · 6/16/16 - Excisional biopsy of abdominal  wall mass performed by Sheng Bowman M.D. at [facility] with pathology revealing metastatic high-grade papillary serous carcinoma.   · 7/7/16 - Received a call from the patient that Tramadol was not working and that she was given Norco #24 after her biopsy at  which did help her pain. Patient prescribed Norco 5/325 one p.o. q. 4-6 hours p.r.n. #60 with no refills. Echocardiogram with left ventricular inferior wall hypokinesis with ejection fraction of 50-55%.   · 7/8/16 - Patient seen in consultation by Sheng Bowman M.D. in consultation at  for metastatic high-grade papillary serous carcinoma diagnosed by excisional biopsy on 6/16/16 with carcinomatosis and patient not a surgical resection candidate. Genetic sequencing and susceptibility testing of tumor was ordered. Biopsy was done of anterior abdominal wall.   · 7/7/16 - Echocardiogram with left atrial enlargement. Mild mitral regurgitation. Left ventricular proximal inferior wall hypokinesis with ejection fraction of 50-55%.   · 7/11/16 - While waiting for genomics results, in order not to delay treatment further, order written for Taxotere 60 mg/M2 IV over one hour followed by Carboplatin AUC 5 over one hour, cycles to be repeated every three weeks.  Comprehensive metabolic panel normal.  26 (0-35).    · 725/16 - Pain contract signed for use of Norco.   · 8/5/16 - Patient stated that she experienced a red rash and was itchy post taking Norco. Norco discontinued and Tramadol refilled.   · 8/16/16 - Patient started cycle 1 chemotherapy. WBC 7.1, hemoglobin 11.2, MCV 88.7, platelet count 94,000. Patient complained of nausea for a week post chemo. Already getting Emend, Aloxi and Decadron. Added Omeprazole 40 mg daily orally.   · 8/17/16 - Urine culture with >100,000 E-coli.   · 8/25/16 - Cycle 2 chemotherapy given.   · 9/9/16 - Magnesium 1.3, replaced intravenously. Potassium 3.4 (3.6-5.1), increased oral potassium supplementation.     · 9/16/16 - Cycle 3 chemotherapy given.   · 9/19/16 - Comprehensive metabolic panel with no significant abnormality.   · 9/20/16 - CT abdomen and pelvis with evidence of progressive metastatic disease in the abdomen and pelvis with interval enlargement of several soft tissue attenuations and subcutaneous nodules in the anterior abdominal wall. Interval enlargement of a left pelvic soft tissue attenuation mass. A lentiform area of low attenuation in the dome of the liver stable in size. Multiple anterior abdominal wall hernias containing fat and/or bowel. Marked pelvic floor laxity. CT chest negative for evidence of metastatic disease. Tiny pulmonary nodules in the right lung stable since 2013 consistent with a benign etiology.   · 9/23/16 - Macrobid 100 mg p.o. b.i.d. x7 days prescribed for UTI. Current chemotherapy discontinued after discussion and new chemotherapy orders written. Carboplatin AUC-5 IV day 1 and Doxil (Liposomal Doxorubicin) 30 mg/M2 IV day 1 to cycle q. 21 days.  of 18 (0-35). Magnesium 1.6, replaced intravenously. Comprehensive metabolic panel with potassium 3.4 (3.6-5.1).     · 10/13/16 - Patient started on cycle 1 of Carboplatin and Liposomal Doxorubicin followed by Neulasta.   · 11/3/16 - Cycle 2 Carbo and Doxil given.   · 11/17/16 - Magnesium 1.0, replaced intravenously. Oral potassium supplement increased.   · 11/29/16 - CT chest with small non-calcified right pulmonary nodules unchanged. Benign bone island in the midthoracic vertebral body along with benign bony hemangiomas in the middle thoracic vertebral bodies. CT abdomen and pelvis with mild progressive metastatic disease from CT of September 2016. Anterior abdominal wall mass superiorly mildly increased in size with new area noted as described measuring 3 x 1.7 cm. 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 Stable area of decreased uptake in the dome of the liver not hypermetabolic  on recent PET scan, likely representing cyst or focal fatty infiltration. Stable small hiatal hernia, fat-containing Bochdalek’s hernia and anterior abdominal wall hernias with stable pelvic floor relaxation.    · 12/1/16 - Cycle 3 chemotherapy given.   · 12/8/16 - Current chemotherapy discontinued and patient started on Gemcitabine 1000 mg/M2 IV days 1, 8, 15 q. 28 days.   · 12/22/16 - Patient seen in followup by Juliana Roy M.D. at the pain clinic, treated with Oxycodone and Lyrica. Transaminases normal. Patient started cycle 1 chemotherapy.   · 12/29/16 - Magnesium 1.1 (1.8-2.5), replaced intravenously.   · 1/5/17 - Cycle 1 day 15 chemotherapy held as patient leaving for vacation to [place]. Chemotherapy dose decreased by 20%. Patient complains of diarrhea for which Imodium prescribed.   · 1/11/17 - BRCA-1 and BRCA-2 negative.   · 1/12/17 - Echocardiogram with ejection fraction 60% or greater.   · 1/19/17 - Comprehensive metabolic panel with no significant abnormality. Patient started cycle 2 chemotherapy.   · 2/2/17 - Urine with >100,000 E-coli treated with Cipro 500 mg p.o. b.i.d. x1 week.   · 2/6/17 - Magnesium 1.1 (1.8-2.5), replaced intravenously.    · 2/23/17 - Patient started cycle 3 chemotherapy.   · 2/27/17 - CT chest with interval development of too numerous to count very small pulmonary nodules, ill-defined ground glass opacities concerning for development of pulmonary metastatic disease. Stable left-sided chest port. CT abdomen and pelvis with stable appearance of multiple small soft tissue densities within the abdomen near midline subcutaneous fat of the abdominal wall. Interval decrease in size of largely calcified left pelvic mass and multiple fat in bowel containing small ventral hernias.   · 3/6/17 - Pulmonary consultation obtained for lung nodules. Discussed CT results with patient. Decision made to continue present chemotherapy until further lung evaluation as abdominal disease  better.  of 18 (0-35).    · 3/16/17 - Patient started cycle 4 chemotherapy, but treatment held from day 8 onwards because of hospitalization.   · 3/19/17 - Patient was hospitalized at [facility] between 3/19/17 and 3/25/17 with chest pain. Chest x-ray had revealed increased mild bibasilar airspace disease. Troponin I and EKG’s were negative. INR was elevated. Patient was treated with antibiotics. EGD was performed revealing small hiatal hernia and esophageal dilatation was performed. Bronchoscopy was performed also with no intrabronchial lesions identified. IgA was 51 (), IgG 320 (600-1500) and IgM 19 (). Lexiscan nuclear stress test had no evidence of reversible myocardial ischemia with normal left ventricular ejection fraction of 58%. CT chest had development of patchy bilateral ground glass opacities most pronounced involving the left upper lobe and bilateral lower lobes. Multiple tiny bilateral pulmonary nodules were less conspicuous. The patient underwent a bronchoscopy by Jerica Esparza M.D. with right upper lobe bronchoalveolar lavage revealing benign squamous cells and bronchial cells with pulmonary macrophages present. There was mild acute inflammation. Negative for malignant cells. Prilosec was changed to Famotidine for hypomagnesemia.    · 4/6/17 - Magnesium 1.2 (1.8-2.5). Comprehensive metabolic panel with no significant abnormality. Patient seen in follow-up by Fito Ram M.D. at [facility]. Treated with Lyrica and Oxycodone.    · 5/4/17 - Patient complains of a rash itching on her right upper arm. Eczematous rash noted and patient prescribed Cyclocort 0.1% b.i.d. Magnesium infusions continued with each chemo. Order written to resume chemotherapy.   · 5/16/17 - Cycle 5 chemotherapy started.   · 5/26/17 - Magnesium 1.4 (1.8-2.5), replaced intravenously. Potassium 2.9 (3.6-5.1), KCL increased to 20 mEq three in the morning and three in the evenings.   · 5/30/17 - PET scan with  remaining metabolic activity within the nodular areas. The suggested mass which is partially calcified and necrotic within the left aspect of the pelvis seems slightly larger with increased metabolic activity. There was more calcification in these areas compared to prior study, suggesting inflammation.      · 6/8/17 - Patient complaining of shortness of breath. Does take HCTZ at home. Chest x-ray ordered and chemotherapy continued along with weekly magnesium replacement. Chest x-ray with no acute cardiopulmonary abnormalities.   · 6/13/17 - Patient received cycle 6 of chemotherapy.   · 7/31/17 - WBC 5.0, hemoglobin 11.2, MCV 89.7, platelet count 217,000. Patient is to resume chemotherapy starting with cycle 7 on Wednesday of this week.  of 19 (<35). Potassium 3.3 (3.5-5.3).     · 8/2/17 - Patient began cycle 7 of chemotherapy.   · 8/30/17 - Patient started cycle 8 chemotherapy.   · 9/5/17 - Patient seen in followup at Pain Management by Fito Ram M.D. and continued on treatment with Oxycodone and Lyrica.   · 9/13/17 - Patient was hospitalized at [facility] for a day with dizziness secondary to dehydration, hypokalemia and anemia. She was given 1 unit of packed red blood cells and electrolytes were replaced. Chest x-ray revealed a subtle opacity in the left lateral lung base favored to represent atelectasis. Magnesium 1.3 (1.5-2.5), patient continued on replacement.    · 9/22/17 - Chemotherapy and magnesium replacement continued with decrease in chemotherapy dose by 10% secondary to cytopenias.   · 9/25/17 - Cycle 9 chemotherapy started.   · 10/12/17 - CT of the chest with contrast showed several tiny pulmonary nodules. One within the right lower lobe appears new from prior exams. Two adjacent foci of nodularity within the medial right lower lobe suggestive of minor infectious or inflammatory process. CT of the abdomen and pelvis with contrast which showed soft tissue nodules along the anterior abdominal  and pelvic walls unchanged from previous scan. Also a partially calcified mass within the left pelvis unchanged. No acute process identified within the abdomen or pelvis. Several left paracentral ventral hernias unchanged. No evidence of acute bowel obstruction.   · 10/16/17 - Order written for Levaquin 500 mg p.o. daily as the patient states that she has had a productive cough, fever and chills and with the results of the CT scan showing a possible infectious versus inflammatory process. Order also written to continue chemotherapy and magnesium replacement as previously prescribed.   · 10/23/17 - Cycle 10 chemotherapy started.   · 11/19/17 - Patient went to the Emergency Room at [facility] complaining of right lower extremity redness and fever for a day. Venous Doppler had no evidence of a DVT and patient was discharged home on Clindamycin.   · 11/21/17 -  of 17 (0-35).   · 12/4/17 - Cycle 11 chemotherapy started.   · 12/20/17 - PET scan with multiple hypermetabolic soft tissue nodules abutting the anterior abdominal wall, stable from previous examination. Peripherally calcified left lower quadrant mass near the ascending colon stable in size demonstrating no hypermetabolic uptake. Previously had maximum SUV of 7. Right medial basilar pulmonary nodules resolved.   · 12/22/17 - CT left lower extremity with soft tissue swelling with skin thickening present along the anterior and medial aspect of the leg, slightly more pronounced around the palpable marker seen within the upper medial aspect of the lower extremity.   · 12/26/17 - Elastic stockings prescribed for lower extremity edema.   · 1/8/18 - Patient hospitalized at [facility] between 1/8/18 and 1/11/18 with fever of up to 101 degrees and painful rash on the lower extremities of several weeks’ duration. She was found to be hypokalemic and MRI of the lower extremities revealed thickening and swelling. Chest x-ray had no acute cardiopulmonary abnormality. Skin biopsy was  performed by Surgery revealing stasis dermatitis and no evidence of malignancy. Infectious Disease consultation was obtained and IV antibiotics were stopped. Blood cultures had no growth.   · 1/22/18 - Patient asked to start wearing elastic stockings which she has not started yet. She was given a prescription for Dyazide 1 p.o. daily.   · 2/26/18 - Ordered chemo to resume again. Patient unaware that she was supposed to resume chemo after her last visit in January. Continue magnesium infusions on day 1, 8 and 15. Prescribed Levaquin 500 mg p.o. daily x10 days for productive cough x1 week. History of viral illness consistent with influenza.  of 18 (0-35). Chest x-ray with no acute process.    · 3/5/18 - Cycle 12 chemotherapy started.   · 3/26/18 - Options discussed with patient. Decision made to discontinue IV Gemzar and orders written to start on Niraparib (Zejula) 300 mg p.o. daily as maintenance therapy.   · 4/11/18 - Niraparib discontinued due to insurance denial. Orders written to start Carboplatin-AUC 5 IV day 1 cycling every 21 days. Orders written for Neulasta 6 mg subcutaneous kit day 1 with palliative treatment intent and expected duration of treatment three months.   · 4/12/18 - CT chest, abdomen and pelvis with contrast revealed numerous tiny centrilobular nodules in the lungs favored to reflect infectious or inflammatory process. Nodules ranged 1-3 mm in size and are new from prior studies with metastatic disease not entirely excluded. Stable small hiatal hernia. Interval progression of metastatic disease involving the subcutaneous tissues at the ventral abdominal wall. Multiple soft tissue density nodules previously shown to be hypermetabolic on PET scan. New small crescent of low attenuation material along the periphery of the spleen with similar finding at the dome of the liver. Findings are concerning for peritoneal metastases. Density of the dome of the liver remained unchanged from multiple  prior studies. Stable calcified mass in the left pelvic sidewall. Urinary bladder wall thickening.   · 4/23/18 - Cycle 1 chemotherapy with Carboplatin administered along with Neulasta injection.   · 4/26/18 - Neulasta discontinued due to insurance denial.   · 5/14/18 - Patient received cycle 2 Carboplatin.   · 6/5/18 - Cycle 3 day 1 chemotherapy with Carboplatin administered.   · 6/20/18 - CT chest, abdomen and pelvis at [facility] showed re-demonstration of soft tissue mass in the ventral wall hernia left of the midline as well as the dome of the liver, likely representing metastatic disease similar as compared to the prior study. Additional calcified lesions present in the upper left hemipelvis and along the anterior abdominal wall to the right of the midline also likely representing metastatic disease. No definite new lesions were identified. Moderate to large stool burden likely related to mild constipation was present. No suspicious lung nodules were identified. The previously-described ground glass nodules appeared to have resolved. There was a stable subpleural nodule in the right upper lobe measuring 3 mm present since 2014 without significant changes.   · 6/26/18 - Cycle 4 day 1 Carboplatin administered with addition of Neulasta for febrile neutropenia prophylaxis.   · 6/29/18 - Reviewed CT scans with patient. Orders written to discontinue Carboplatin and Neulasta and plan to start Niraparib 300 mg by mouth daily as maintenance therapy. Prescribed Amlodipine 2.5 mg p.o. daily for chemo-induced hypertension.   · 7/18/18 - Orders written to increase weekly Magnesium Sulfate to 3 g IV weekly due to continued hypomagnesemia.   · 8/1/18 - Patient reports she has not received Niraparib (Zejula) to date.   · 8/30/18 - Patient’s phone was not working so though Niraparib approved, the drug company had not been able to get ahold of her. Patient supplied with drug company number so that she can start taking  the pills.   · 9/6/18 -  of 20 (N).   · 9/18/18 - Urinalysis done for dysuria and back pain. Urine culture grew 20,000 to 50,000 colonies of urogenital ruy. Prescribed Bactrim DS one tablet twice daily for one week.   · September 2018 - Patient initiated on Zejula 300 mg p.o. daily.   · 10/1/18 - WBC 3.5, hemoglobin 12, platelet count 74,000. Niraparib (Zejula) dose held and then resumed when platelets above 100,000 at a dose of 200 mg daily.   · 10/15/18 - Patient received Niraparib (Zejula) at 200 mg p.o. daily with a platelet count of 198,000.   · 11/7/18 - WBC 6, hemoglobin 11.6, MCV 96.2, platelet count 137,000. Patient claims to have stopped taking Niraparib a few days prior because of cough. Asked to resume taking it. Ciprofloxacin 500 mg p.o. twice daily for one week for bronchitis.   · 12/3/18 - Magnesium Sulfate infusions changed to every two weeks, to send mag level before and give 3 grams. DC’d Magnesium Oxide and started Magnesium Plus Protein two pills twice daily.   · 1/4/19 - CT chest, abdomen and pelvis with new patchy nodular areas of airspace consolidation within the bilateral upper lobes, left greater than right, favored to be infectious or inflammatory. Interval enlargement of the peritoneal soft tissue masses within the pelvis compatible with progression of disease. Enlarging ventral hernia now containing multiple loops of small bowel and colon.   · 1/7/19 - Patient has not been coming in for weekly magnesium replacement as nursing has not been able to get ahold of her. Results of CT’s discussed with patient. Decision made to change her to IV chemotherapy with Carboplatin AUC-5 day 1 and pegylated liposomal Doxorubicin (Doxil) 30 mg/M2 IV day 1 to cycle every four weeks. Patient made aware of the limited choices of her treatment available.   · 1/31/19 - Echocardiogram showed LVEF 50-55% and otherwise normal echo Doppler study.   · 2/4/19 - New chemotherapy not initiated to date with  difficulty contacting patient for echocardiogram. Neulasta On-Pro 6 mg ordered with chemotherapy due to extensive prior exposure to chemotherapy, increasing risk of febrile neutropenia, history of neutropenic events on prior chemotherapy regimens.   · 2/15/19 - Patient started cycle 1 chemotherapy with Neulasta support.   · 3/6/19 -  of 28 (0-35).   · 3/15/19 - Cycle 2 Carboplatin with Liposomal Doxorubicin (Doxil) and Neulasta support initiated.     · 4/10/19 - Patient was requested to followup with Dr. Queen regarding hypotension and blood pressure medication dosing. Patient appears to be tolerating treatment well with cytopenias not requiring dose adjustments. No Doxil-related skin or mucus membrane toxicities or other significant toxicities to date.   · 4/12/19 - Cycle 3 chemotherapy given.   · 4/16/19 - CT chest, abdomen and pelvis with no evidence of active metastasis to the chest. Several tree-in-bud nodules within the periphery of the inferior right upper lobe likely infectious or inflammatory. Disease burden within abdomen and pelvis stable to slightly increased from prior CT. Specifically, a soft tissue mass along the right rectus muscle slightly increased in bulk. Multiple ventral hernias, some containing loops of bowel, with no evidence of acute bowel obstruction.   · 5/8/19 - Discussed CT results with patient. Overall stable disease and would like to continue same treatment. Dove soap and Hydrocortisone Cream p.r.n. prescribed for scattered rash on back.   · 5/10/19 - Cycle 4 Carboplatin and Liposomal Doxorubicin initiated.   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 It was ER positive, HER2/zuleima negative, PD-L1 negative. There was TOPO1 positivity and TUBB3 positivity. TMB was low (two mutations per megabyte MUTS/MB) and MSI was stable. There were 13 therapies considered with increased benefit. The 13 therapies with increased  benefit included Fulvestrant, Irinotecan, Letrozole, Topotecan, Anastrazole, Exemestane, Tamoxifen, all of which were referenced to [facility] as well as Abemaciclib, Everolimus, Gefitinib, Palbociclib, Ribociclib and Toremifene.     · 6/5/19 echocardiogram with preserved LV systolic function with EF around 60%.  · 6/7/19 cycle 5 chemotherapy with Neulasta support given.  Patient continued on mag oxide 400 mg p.o. twice daily and weekly IV 3 g mag sulfate infusions for persistent hypomagnesemia.  · 7/5/2019- cycle 6 chemotherapy with carboplatin and doxorubicin with Neulasta port initiated.  Ca125:  21 (0-35).  · 7/23/2019-CT chest abdomen and pelvis compared to CT from 4/16/2019 revealed multiple small pulmonary nodules unchanged from prior examination within the right upper and left lower lobes.  Complex ventral hernia similar to prior exam.  Soft tissue nodule along the right lateral margin of the right of the midline measuring 12 x 10 mm unchanged in size.  Irregular soft tissue nodular thickening along the margin of the most inferior aspect of the complex ventral hernia with thickness measuring up to 13 mm similar to prior examination.  Extensive calcified and soft tissue mass within the left lower quadrant decrease in size now measuring 2.6 x 2.3 cm previously 3.0 x 2.5 cm.  Partially calcified mass involving the right rectus sheath measuring 3.1 x 2.7 cm previously measured 3.3 x 2.9 cm.  Densely calcified mass measuring 2.1 x 1.6 cm unchanged previously measured 2 x 1.7 cm.  Right external iliac chain lymph node measuring 9 mm previously measured 5 mm.  · 8/2/2019 cycle 7 chemotherapy given.  · 8/30/2019-cycle 8 chemotherapy with carboplatin and doxorubicin with Neulasta support given.  · 9/27/2019 cycle 9 chemotherapy given.  · 10/1/19 - Echocardiogram showed Normal LV size and contractility EF of 60-65%. Normal RV size. Borderline left atrial enlargement. Aortic valve, mitral valve, tricuspid valve appears  structurally normal, no significant regurgitation seen.No pericardial effusion seen. Proximal aorta appears normal in size.  · 10/18/19 - Magnesium 1.5 (1.8-2.5).  IV magnesium replacement continued.  · 10/25/2019 cycle 10 chemotherapy given.  · 10/29/2019 patient had CT scan of the chest abdomen and pelvis-there is a new 2 mm nodule in the left lower lobe with a stable 2 mm nodule in the left lower lobe.  Follow-up in 6 months was recommended.  Stable multiple soft tissue density and calcified nodules within the ventral abdominal wall and left retroperitoneal fat consistent with nonviable metastatic disease.  These nodules have either decreased in size or stable.  · 11/22/2019 10 received cycle 11 of chemotherapy with Doxil and carboplatinum with Neulasta  · 12/8/2019 to 12/11/2019 patient was admitted to the hospital for neutropenic fever.  · 12/20/2019 patient received cycle 12 of chemotherapy with Doxil and carboplatinum with Neulasta  · 1/13/20: 2 D echo was normal with EF 56% to 60%  · 1/24/20-Patient received cycle 13 of combination chemotherapy with carboplatinum and Doxil  · 2/3/2020 patient had a  which was 25.4  · 2/21/2020-patient received cycle 14 of chemotherapy with carboplatinum and Doxil       2. Deep vein thrombosis of left upper extremity diagnosis established in October of 2013.  · 10/16/13 - Venous Doppler of left upper extremity.  Impression:  Deep vein thrombosis involving the subclavian, axillary and brachial veins on the left side, superficial vein thrombosis involving the left basilic vein.  · 10/16/13 - Order written for Lovenox 120 mg subcutaneously daily until INR is in therapeutic range.  Order written for Coumadin 5 mg p.o. daily.  The patient is to follow PT and INR protocol.  · 5/20/16 - Venous Doppler bilateral lower extremities normal.  · 7/30/19 - Patient continued on Coumadin following the INR protocol.      3. Anemia diagnosis established in November 2013 and pernicious  anemia diagnosis established March 2016.   · 11/15/13 - Hemoglobin is 7.8.  · 12/2/13 - Orders written to start Aranesp 200 mg subcutaneous every two weeks due to low hemoglobin secondary to chemo induced anemia.  Hemoglobin is 9.7.  Anemia workup is ordered.  · 12/03/13 - Ferritin 179.8 (N), folic acid 8.3 (N), vitamin B12 314, iron 104 (N), TIBC 298 (N), iron saturation 35 (N), Reticulocyte count 0.88 (N).  EPO level 124 (N).  Haptoglobin 22 (H).  · 10/22/14 - Hemoglobin 12.5, hematocrit 37.3, MCV 91.6.  · 10/23/14 - Anemia resolved.   · 3/8/16 - WBC 5.8, hemoglobin 11.7, MCV 90.3, platelet count 245,000. Vitamin B12 of 189 (180-914), ferritin 61 (), iron 91 (), TIBC 345 (228-428).   · 3/15/16 -  ().        · 3/22/16 - Intrinsic factor antibody positive, antiparietal antibody negative. Vitamin B12 at 1000 mcg IM weekly started, but the patient after receiving first dose on 3/22/16 did not come back for injections until 4/13/16.   · 4/13/16 - WBC 5.1, hemoglobin 12.5, MCV 87.9, platelet count 201,000. Patient given B12 injection and then continued at home.   · 5/10/16 - WBC 5.1, hemoglobin 12.5, platelet count 201,000.   · 6/14/16 - Monthly Vitamin B12 injections continued at home.   · 7/11/16 - WBC 5.48, hemoglobin 12.7, MCV 86.2, platelet count 200,000.   · 8/16/16 - Patient continued on monthly Vitamin B12 injections at home.   · 1/5/17 - WBC 2.6 with 38% neutrophils, 56% lymphocytes, 5% monocytes, hemoglobin 10.5, .3, platelet count 63,000. Patient continued on Vitamin B12 injections 1000 mcg IM monthly at home.   · 3/20/17 - Retic 0.41 (0.5-1.5), creatinine 1.0 (0.4-1.0). Iron 12 (), TIBC 270 (228-428), ferritin 259 (), haptoglobin 340 (), TSH 0.43 (0.34-5.6), folate 6.0 (5.9-24.8). Serum protein electrophoresis revealed decreased gammaglobulins. Serum EDU had no monoclonal gammopathy identified. Stool Hemoccult was negative.   · 6/8/17 - WBC 6.3, hemoglobin  8.3, MCV 92.4, platelet count 50,000. Procrit 40,000 units subq weekly added for chemotherapy-induced anemia. Patient continued on Vitamin B12 at 1000 mcg IM monthly at home.   · 7/5/17 - Iron 33 (), TIBC 328 (228-428), ferritin 211 (), TSH 2.46 (0.34-5.6).       · 7/31/17 - WBC 5.0, hemoglobin 11.2, MCV 89.7, platelet count 217,000. We will continue with the Procrit 40,000 units subq weekly for chemo-induced anemia when the hemoglobin falls below 10.0 and the patient is also to continue with the Vitamin B12 at 1000 mcg IM monthly at home. Creatinine 1.24 (0.5-0.99).    · 8/28/17 - WBC 9.6, hemoglobin 8.7, MCV 93.7, platelet count 133,000. Patient is to continue with the Procrit 40,000 units subq weekly and will receive that today. She is also to continue with the Vitamin B12 at 1000 mcg IM monthly at home.  · 10/16/17 - WBC 7.5, hemoglobin 9.6, MCV 98.4, platelet count 195,000. Patient is to continue with Procrit 40,000 units subq weekly and will receive Procrit today.   · 1/1/18 - Creatinine 1.3 (0.4-1.0).    · 2/19/18 - Procrit given for hemoglobin 9.9.   · 2/26/18 - Continue Procrit 40,000 units weekly p.r.n. hemoglobin <10. Continue Vitamin B12 at 1000 mcg IM monthly at home.   · 3/26/18 - Hemoglobin 8.3. Procrit dose increased to 60,000 units weekly. Iron 29 (), TIBC 306 (228-428), and iron sat 9% (15-50). Iron 29 (), TIBC 306 (228-428), iron saturation 9% (15-50).   · 3/27/18 - Order written to start Ferrous sulfate 325 mg p.o. b.i.d.    · 4/2/18 - Stool heme negative x3.   · 4/30/18 - Patient had not started Ferrous sulfate 325 mg p.o. b.i.d. and verbalized understanding to do so and to notify the office if side effects are not tolerable.   · 5/24/18 - Patient was hospitalized at [facility] between 5/24/18 and 5/26/18 with dark tarry stool. INR was subtherapeutic. She was given IV Protonix and Protonix 40 mg daily. She underwent an EGD by David Rogers M.D. revealing small hiatal  hernia, small submucosal nodule near the cardia, erosive gastritis. Pathology on gastric antrum and body biopsy revealed iron pill gastritis and no evidence of Helicobacter pylori. She then underwent a colonoscopy revealing diverticulosis, hemorrhoids and surgical changes from previous resection. It was recommended to consider outpatient M2A if hemoglobin continued to drop.   · 6/4/18 - Order written to discontinue iron pills and to use Injectafer 750 mg IV days 1 and 8 for low iron sats. Order written for Procrit 40,000 units subq weekly. Iron 65 (), TIBC 328 (228-428), iron saturation 20% (15-50), ferritin 123 ().   · 6/29/18 - Discussed patient’s intolerance of oral iron due to gastritis. Oral iron discontinued with plans for Injectafer should iron level drop in the future.   · 11/7/18 - Patient claims not to be taking Vitamin B12 injections at home. Asked to resume those.   · 12/3/18 - Patient reports she has not been taking her B12 injections for a couple of months. She needs some syringes and needles for that.   · 2/4/19 - Patient is uncertain if she is currently taking Ferrous Sulfate or not. Patient with history of intolerance and will follow hemoglobin.   · 5/8/19 - Ferritin 64 ().  · 8/27/2019- iron 52 (), iron saturation 14% (15-50), TIBC 364 (228-420), and ferritin 74 ().  Injectafer 750 mg IV day 1 and 8 due to oral iron intolerance ordered.  · 8/30/2019- Injectafer 750 mg IV day 1 given.  Hemoglobin 10.8.  At the end of the infusion heart rate was 48 and the patient reported mild dizziness.  Patient was sent to the ED for evaluation with symptoms resolving rapidly.  Chest x-ray and CT head were negative for acute findings.  · 9/6/2019-Injectafer 750 mg IV day 8 given without adverse effects.  Hemoglobin 9.8.   · 9/25/19 - Iron 85 (). Iron saturation 25 (15-50). TIBC 335 (228-428). Ferritin  694 ().  Hemoglobin 10.3.  · 10/25/2019 hemoglobin 9.7.  Patient  started on Retacrit 40,000 units subcu weekly.    Past Medical History:   Diagnosis Date   • Anemia 2013   • Cervical disc disorder 2013    Herniated disc, pinched nerve   • Clotting disorder (CMS/HCC) 2013    Low platelets from chemo   • Colon polyp 2013   • Deep vein thrombosis (CMS/[facility]) 2013   • Diverticulosis 2013   • GERD (gastroesophageal reflux disease) 2016   • HL (hearing loss) 2016    I need hearing aids   • Hyperlipidemia 2013    Need my cholesterol rechecked   • Hypertension    • Joint pain 2013    Shoulder pain, torn rotator cuff   • Low back pain 2013   • Neuromuscular disorder (CMS/[facility]) 2015    Shingles, pinched nerves in my neck   • Osteopenia 2014   • Osteoporosis    • Ovarian cancer (CMS/[facility])    • Pneumonia 2010    Have had it several times   • Spinal stenosis 2013    Cervical   • Urinary tract infection 2013    Have had several utis       Past Surgical History:   Procedure Laterality Date   • COLON SURGERY     • COLONOSCOPY  05/26/2018   • EXPLORATORY LAPAROTOMY     • HERNIA REPAIR     • HYSTERECTOMY     • OOPHORECTOMY           Current Outpatient Medications:   •  acetaminophen (TYLENOL) 500 MG tablet, Take 1,000 mg by mouth Every 6 (Six) Hours As Needed for Mild Pain ., Disp: , Rfl:   •  atorvastatin (LIPITOR) 20 MG tablet, Take 20 mg by mouth every night at bedtime., Disp: , Rfl: 3  •  cyanocobalamin 1000 MCG/ML injection, Inject 1,000 mcg into the appropriate muscle as directed by prescriber Every 28 (Twenty-Eight) Days., Disp: , Rfl:   •  famotidine (PEPCID) 40 MG tablet, TAKE 1 TABLET BY MOUTH EVERY MORNING BEFORE BREAKFAST, Disp: 90 tablet, Rfl: 1  •  LYRICA 100 MG capsule, Take 1 capsule by mouth 3 (Three) Times a Day., Disp: 90 capsule, Rfl: 2  •  magnesium oxide (MAG-OX) 400 MG tablet, Take 400 mg by mouth 2 (Two) Times a Day., Disp: , Rfl:   •  ondansetron ODT (ZOFRAN-ODT) 8 MG disintegrating tablet, Take 8 mg by mouth Every 8 (Eight) Hours As Needed for Nausea or Vomiting., Disp: ,  "Rfl:   •  oxyCODONE (ROXICODONE) 10 MG tablet, Take 1 tablet by mouth 3 (Three) Times a Day As Needed for Moderate Pain ., Disp: 90 tablet, Rfl: 0  •  potassium chloride (K-DUR,KLOR-CON) 20 MEQ CR tablet, Take 2 tablets by mouth Every Morning., Disp: 30 tablet, Rfl: 0  •  promethazine (PHENERGAN) 25 MG tablet, Take 1 tablet by mouth Every 6 (Six) Hours As Needed for Nausea or Vomiting., Disp: 12 tablet, Rfl: 0  •  sertraline (ZOLOFT) 50 MG tablet, TAKE 1 TABLET BY MOUTH EVERY EVENING BEFORE BED, Disp: 90 tablet, Rfl: 1  •  Syringe 23G X 1\" 3 ML misc, INJECT 1 ML B-12 ONCE MONTHLY, Disp: 1 each, Rfl: 3  •  temazepam (RESTORIL) 30 MG capsule, TAKE 1 CAPSULE BY MOUTH AT NIGHT AS NEEDED FOR SLEEP., Disp: 30 capsule, Rfl: 1  •  triamterene-hydrochlorothiazide (DYAZIDE) 37.5-25 MG per capsule, TAKE 1 CAPSULE BY MOUTH EVERY DAY, Disp: 90 capsule, Rfl: 1  •  warfarin (COUMADIN) 5 MG tablet, Active thrombosis  Indications: DVT/PE (active thrombosis), Disp: 30 tablet, Rfl: 0  •  warfarin (COUMADIN) 1 MG tablet, Take 1 mg by mouth Daily., Disp: , Rfl: 2    Allergies   Allergen Reactions   • Morphine Nausea Only and Hives   • Penicillin V Potassium Rash   • Sulfa Antibiotics Rash   • Levaquin [Levofloxacin] Nausea Only and Dizziness     When taken with Coumadin   • Amoxicillin Rash   • Norco [Hydrocodone-Acetaminophen] Rash       Family History   Problem Relation Age of Onset   • Hypertension Mother    • Cancer Father    • Hypertension Father    • Hypertension Sister    • Hypertension Brother    • Stroke Brother        Cancer-related family history includes Cancer in her father.    Social History     Tobacco Use   • Smoking status: Never Smoker   • Smokeless tobacco: Never Used   Substance Use Topics   • Alcohol use: No     Frequency: Never     Comment: caffeine 32-64oz qd   • Drug use: No       I have reviewed the history of present illness, past medical history, family history, social history, lab results, all notes and other " "records since the patient was last seen on 11/27/2019.    SUBJECTIVE:  The patient is here for a follow up appointment.  Reports that she has felt okay since chemo. Patient states she's been doing well on the chemo regimen she is taking. Patient said that she is having problems with her eyes and says it's like a burning sensation.           REVIEW OF SYSTEMS:  Review of Systems   Constitutional: Negative for chills and fever.   HENT: Negative for ear pain, mouth sores, nosebleeds and sore throat.    Eyes: Negative for photophobia and visual disturbance.        Wears eye glasses   Respiratory: Negative for wheezing and stridor.    Cardiovascular: Negative for chest pain and palpitations.   Gastrointestinal: Negative for abdominal pain, diarrhea, nausea and vomiting.   Endocrine: Negative for cold intolerance and heat intolerance.   Genitourinary: Negative for dysuria and hematuria.   Musculoskeletal: Negative for joint swelling and neck stiffness.   Skin: Negative for color change and rash.   Neurological: Negative for seizures and syncope.   Hematological: Negative for adenopathy.        No obvious bleeding   Psychiatric/Behavioral: Negative for agitation, confusion and hallucinations.       OBJECTIVE:    Vitals:    03/04/20 0912   BP: 110/65   Pulse: 62   Resp: 18   Temp: 99 °F (37.2 °C)   Weight: 82.9 kg (182 lb 12.8 oz)   Height: 165.1 cm (65\")   PainSc: 0-No pain       ECOG  (1) Restricted in physically strenuous activity, ambulatory and able to do work of light nature    Physical Exam   Constitutional: She is oriented to person, place, and time. No distress.   HENT:   Head: Normocephalic and atraumatic.   Sore noted on tongue   Eyes: Conjunctivae and EOM are normal. Right eye exhibits no discharge. Left eye exhibits no discharge. No scleral icterus.   Neck: Normal range of motion. Neck supple. No thyromegaly present.   Cardiovascular: Normal rate, regular rhythm and normal heart sounds. Exam reveals no gallop " and no friction rub.   Pulmonary/Chest: Effort normal. No stridor. No respiratory distress. She has no wheezes.   Left chest wall port   Abdominal: Soft. Bowel sounds are normal. She exhibits no mass. There is no tenderness. There is no rebound and no guarding.   Musculoskeletal: Normal range of motion. She exhibits no tenderness.   Lymphadenopathy:     She has no cervical adenopathy.   Neurological: She is alert and oriented to person, place, and time. She exhibits normal muscle tone.   Skin: Skin is warm. No rash noted. She is not diaphoretic. No erythema.   Psychiatric: She has a normal mood and affect. Her behavior is normal.   Nursing note and vitals reviewed.      RECENT LABS  WBC   Date Value Ref Range Status   03/04/2020 4.97 3.40 - 10.80 10*3/mm3 Final     RBC   Date Value Ref Range Status   03/04/2020 3.01 (L) 3.77 - 5.28 10*6/mm3 Final     Hemoglobin   Date Value Ref Range Status   03/04/2020 10.3 (L) 12.0 - 15.9 g/dL Final     Hematocrit   Date Value Ref Range Status   03/04/2020 32.7 (L) 34.0 - 46.6 % Final     MCV   Date Value Ref Range Status   03/04/2020 108.6 (H) 79.0 - 97.0 fL Final     MCH   Date Value Ref Range Status   03/04/2020 34.2 (H) 26.6 - 33.0 pg Final     MCHC   Date Value Ref Range Status   03/04/2020 31.5 31.5 - 35.7 g/dL Final     RDW   Date Value Ref Range Status   03/04/2020 15.9 (H) 12.3 - 15.4 % Final     RDW-SD   Date Value Ref Range Status   03/04/2020 60.9 (H) 37.0 - 54.0 fl Final     MPV   Date Value Ref Range Status   03/04/2020 10.8 6.0 - 12.0 fL Final     Platelets   Date Value Ref Range Status   03/04/2020 94 (L) 140 - 450 10*3/mm3 Final     Neutrophil %   Date Value Ref Range Status   03/04/2020 67.2 42.7 - 76.0 % Final     Lymphocyte %   Date Value Ref Range Status   03/04/2020 21.5 19.6 - 45.3 % Final     Monocyte %   Date Value Ref Range Status   03/04/2020 9.3 5.0 - 12.0 % Final     Eosinophil %   Date Value Ref Range Status   03/04/2020 1.8 0.3 - 6.2 % Final      Basophil %   Date Value Ref Range Status   03/04/2020 0.2 0.0 - 1.5 % Final     Neutrophils, Absolute   Date Value Ref Range Status   03/04/2020 3.34 1.70 - 7.00 10*3/mm3 Final     Lymphocytes, Absolute   Date Value Ref Range Status   03/04/2020 1.07 0.70 - 3.10 10*3/mm3 Final     Monocytes, Absolute   Date Value Ref Range Status   03/04/2020 0.46 0.10 - 0.90 10*3/mm3 Final     Eosinophils, Absolute   Date Value Ref Range Status   03/04/2020 0.09 0.00 - 0.40 10*3/mm3 Final     Basophils, Absolute   Date Value Ref Range Status   03/04/2020 0.01 0.00 - 0.20 10*3/mm3 Final     nRBC   Date Value Ref Range Status   12/11/2019 0.3 (H) 0.0 - 0.2 /100 WBC Final       Lab Results   Component Value Date    GLUCOSE 77 02/21/2020    BUN 15 02/21/2020    CREATININE 0.90 02/21/2020    EGFRIFNONA 71 02/21/2020    EGFRIFAFRI >60 06/07/2019    BCR 18.5 02/21/2020    K 4.3 02/21/2020    CO2 27.0 02/21/2020    CALCIUM 9.4 02/21/2020    ALBUMIN 4.00 02/21/2020    LABIL2 1.5 06/07/2019    AST 23 02/21/2020    ALT 10 02/21/2020         Assessment/Plan     Hypomagnesemia  - Magnesium    Malignant neoplasm of right ovary (CMS/HCC)  - Comprehensive Metabolic Panel  - Magnesium      ASSESSMENT:    1. Recurrent ovarian cancer on carboplatinum, Doxil.  Patient had had carboplatinum Taxol, carbo Taxotere, Gemzar single agent, Niraparib and currently on Doxil and carboplatinum.  Refer to paradigm testing for future options at time of progression  2. Iron deficiency anemia  3. Pancytopenia: Due to chemotherapy  4. Hypomagnesemia  5. B12 deficiency on parenteral B12 replacement  6. Mucositis due to chemotherapy  7. Monitoring for chemotherapy toxicities    The patient claims to be tolerating chemotherapy well and her scans are stable except for a new 2 mm nodule in the left lower lobe of the lung.  Have made her aware that if her tumors continue to shrink that we will continue her on the same treatment but if they stop shrinking, then we will  stop the current treatment and observe her.  Her tumor markers have mostly been in the normal range.  She is taking 3 magnesium pills daily and in addition getting weekly IV replacement as needed.  She is not taking any oral iron as she does not absorb it.  On  Coumadin, her is monitored in the Coumadin clinic.  She is to continue herself on vitamin B12 injections to switch to treatment at the cancer center.    PLANS:     Continue chemotherapy only with carboplatinum.   monhly  Check restaging CT scans of the chest, abdomen and pelvis first week in February. Already ordered.  Continue magnesium oxide 400 mg three times a day and weekly IV replacement as needed.   Continue Coumadin  Continue b12 injections IM monthly. Transitioned injections to be done at the cancer center  Continue Retacrit 40,000 units subcu weekly for hemoglobin less than 10   Discontinue Doxil due to approaching lifetime dosing.  CBC prior to cataract surgery  Call for problems in the interim  Magic mouthwash       I have reviewed labs results, imaging, vitals, and medications with the patient today. Will follow up in 1 month with me.    Patient verbalized understanding and is in agreement of the above plan.    Part of this document was scribed by Mallory Connors, RN, BSN.      I spent greater than 40 minutes in face-to-face time with the patient and 95% of that time were spent in counseling and coordination of care, including review of imaging and pathology; indications for treatment, goals of therapy, alternatives and risks - both common and rare - as well as surveillance and potential outcomes.

## 2020-03-04 ENCOUNTER — OFFICE VISIT (OUTPATIENT)
Dept: LAB | Facility: HOSPITAL | Age: 65
End: 2020-03-04

## 2020-03-04 ENCOUNTER — OFFICE VISIT (OUTPATIENT)
Dept: ONCOLOGY | Facility: CLINIC | Age: 65
End: 2020-03-04

## 2020-03-04 VITALS
RESPIRATION RATE: 18 BRPM | WEIGHT: 182.8 LBS | BODY MASS INDEX: 30.46 KG/M2 | SYSTOLIC BLOOD PRESSURE: 110 MMHG | HEIGHT: 65 IN | HEART RATE: 62 BPM | TEMPERATURE: 99 F | DIASTOLIC BLOOD PRESSURE: 65 MMHG

## 2020-03-04 DIAGNOSIS — C56.1 MALIGNANT NEOPLASM OF RIGHT OVARY (HCC): ICD-10-CM

## 2020-03-04 DIAGNOSIS — E83.42 HYPOMAGNESEMIA: Primary | ICD-10-CM

## 2020-03-04 LAB
ALBUMIN SERPL-MCNC: 4.2 G/DL (ref 3.5–5.2)
ALBUMIN/GLOB SERPL: 1.8 G/DL
ALP SERPL-CCNC: 139 U/L (ref 39–117)
ALT SERPL W P-5'-P-CCNC: 10 U/L (ref 1–33)
ANION GAP SERPL CALCULATED.3IONS-SCNC: 11 MMOL/L (ref 5–15)
AST SERPL-CCNC: 20 U/L (ref 1–32)
BASOPHILS # BLD AUTO: 0.01 10*3/MM3 (ref 0–0.2)
BASOPHILS NFR BLD AUTO: 0.2 % (ref 0–1.5)
BILIRUB SERPL-MCNC: 0.3 MG/DL (ref 0.2–1.2)
BUN BLD-MCNC: 13 MG/DL (ref 8–23)
BUN/CREAT SERPL: 15.7 (ref 7–25)
CALCIUM SPEC-SCNC: 9.7 MG/DL (ref 8.6–10.5)
CHLORIDE SERPL-SCNC: 105 MMOL/L (ref 98–107)
CO2 SERPL-SCNC: 28 MMOL/L (ref 22–29)
CREAT BLD-MCNC: 0.83 MG/DL (ref 0.57–1)
DEPRECATED RDW RBC AUTO: 60.9 FL (ref 37–54)
EOSINOPHIL # BLD AUTO: 0.09 10*3/MM3 (ref 0–0.4)
EOSINOPHIL NFR BLD AUTO: 1.8 % (ref 0.3–6.2)
ERYTHROCYTE [DISTWIDTH] IN BLOOD BY AUTOMATED COUNT: 15.9 % (ref 12.3–15.4)
GFR SERPL CREATININE-BSD FRML MDRD: 69 ML/MIN/1.73
GLOBULIN UR ELPH-MCNC: 2.3 GM/DL
GLUCOSE BLD-MCNC: 79 MG/DL (ref 65–99)
HCT VFR BLD AUTO: 32.7 % (ref 34–46.6)
HGB BLD-MCNC: 10.3 G/DL (ref 12–15.9)
LYMPHOCYTES # BLD AUTO: 1.07 10*3/MM3 (ref 0.7–3.1)
LYMPHOCYTES NFR BLD AUTO: 21.5 % (ref 19.6–45.3)
MAGNESIUM SERPL-MCNC: 1.4 MG/DL (ref 1.6–2.4)
MCH RBC QN AUTO: 34.2 PG (ref 26.6–33)
MCHC RBC AUTO-ENTMCNC: 31.5 G/DL (ref 31.5–35.7)
MCV RBC AUTO: 108.6 FL (ref 79–97)
MONOCYTES # BLD AUTO: 0.46 10*3/MM3 (ref 0.1–0.9)
MONOCYTES NFR BLD AUTO: 9.3 % (ref 5–12)
NEUTROPHILS # BLD AUTO: 3.34 10*3/MM3 (ref 1.7–7)
NEUTROPHILS NFR BLD AUTO: 67.2 % (ref 42.7–76)
PLATELET # BLD AUTO: 94 10*3/MM3 (ref 140–450)
PMV BLD AUTO: 10.8 FL (ref 6–12)
POTASSIUM BLD-SCNC: 4.3 MMOL/L (ref 3.5–5.2)
PROT SERPL-MCNC: 6.5 G/DL (ref 6–8.5)
RBC # BLD AUTO: 3.01 10*6/MM3 (ref 3.77–5.28)
SODIUM BLD-SCNC: 144 MMOL/L (ref 136–145)
WBC NRBC COR # BLD: 4.97 10*3/MM3 (ref 3.4–10.8)

## 2020-03-04 PROCEDURE — 83735 ASSAY OF MAGNESIUM: CPT | Performed by: NURSE PRACTITIONER

## 2020-03-04 PROCEDURE — 80053 COMPREHEN METABOLIC PANEL: CPT | Performed by: NURSE PRACTITIONER

## 2020-03-04 PROCEDURE — 36415 COLL VENOUS BLD VENIPUNCTURE: CPT | Performed by: INTERNAL MEDICINE

## 2020-03-04 PROCEDURE — 99215 OFFICE O/P EST HI 40 MIN: CPT | Performed by: INTERNAL MEDICINE

## 2020-03-04 PROCEDURE — 85025 COMPLETE CBC W/AUTO DIFF WBC: CPT

## 2020-03-10 RX ORDER — PREGABALIN 100 MG/1
CAPSULE ORAL
Qty: 90 CAPSULE | Refills: 2 | OUTPATIENT
Start: 2020-03-10

## 2020-03-17 ENCOUNTER — TELEPHONE (OUTPATIENT)
Dept: ONCOLOGY | Facility: CLINIC | Age: 65
End: 2020-03-17

## 2020-03-17 NOTE — TELEPHONE ENCOUNTER
Pt called requesting to move 3/27/20 appts to 3/26/20 as pt has eye surgery on 3/27/20.    Pt requests and early morning appt on 3/26/20.    Please call pt with rescheduled appt at [phone].

## 2020-03-26 ENCOUNTER — LAB (OUTPATIENT)
Dept: LAB | Facility: HOSPITAL | Age: 65
End: 2020-03-26

## 2020-03-26 ENCOUNTER — HOSPITAL ENCOUNTER (OUTPATIENT)
Dept: ONCOLOGY | Facility: HOSPITAL | Age: 65
Discharge: HOME | End: 2020-03-26
Admitting: NURSE PRACTITIONER

## 2020-03-26 VITALS
BODY MASS INDEX: 30.49 KG/M2 | HEART RATE: 54 BPM | TEMPERATURE: 98.2 F | SYSTOLIC BLOOD PRESSURE: 133 MMHG | RESPIRATION RATE: 18 BRPM | DIASTOLIC BLOOD PRESSURE: 72 MMHG | HEIGHT: 65 IN | WEIGHT: 183 LBS

## 2020-03-26 DIAGNOSIS — I82.722 CHRONIC DEEP VEIN THROMBOSIS (DVT) OF LEFT UPPER EXTREMITY, UNSPECIFIED VEIN (HCC): ICD-10-CM

## 2020-03-26 DIAGNOSIS — Z79.01 LONG TERM (CURRENT) USE OF ANTICOAGULANTS: Primary | ICD-10-CM

## 2020-03-26 LAB — INR PPP: 1.7

## 2020-03-26 PROCEDURE — 85610 PROTHROMBIN TIME: CPT

## 2020-03-26 PROCEDURE — 36416 COLLJ CAPILLARY BLOOD SPEC: CPT

## 2020-03-30 ENCOUNTER — TELEPHONE (OUTPATIENT)
Dept: ONCOLOGY | Facility: HOSPITAL | Age: 65
End: 2020-03-30

## 2020-03-30 NOTE — TELEPHONE ENCOUNTER
Received call from pt's daughter Pati that pt is having cough/Mod SOA and that has temp of 100.   Pt's daughter Pati reports pt started feeling bad yesterday and has had cough for 2-3 days.  Pt daughter reports that she (daughter) had symptoms and has not been tested for COVID 19 but has been in quarantine.  Instructed Pati that pt needs to proceed to Urgent Care at [facility] point for evaluation,  Pati v/u and agreement.

## 2020-03-31 ENCOUNTER — TELEPHONE (OUTPATIENT)
Dept: ONCOLOGY | Facility: HOSPITAL | Age: 65
End: 2020-03-31

## 2020-03-31 ENCOUNTER — TELEPHONE (OUTPATIENT)
Dept: PAIN MEDICINE | Facility: CLINIC | Age: 65
End: 2020-03-31

## 2020-03-31 DIAGNOSIS — R52 PAIN: ICD-10-CM

## 2020-03-31 NOTE — TELEPHONE ENCOUNTER
Pt's daughter called, concerned that pt didn't get tested for covid 19 yesterday when she went to urgent care. Reviewed pt's chart & noted that pt did get tested for influenza & covid 19 yesterday. Informed pt's daughter that results are still pending & it make take up to 14 days for results to come back. Pt to stay home, but instructed to go to ER with any s/s respiratory distress. Pt's daughter verbalized understanding.

## 2020-04-02 ENCOUNTER — TELEPHONE (OUTPATIENT)
Dept: URGENT CARE | Facility: CLINIC | Age: 65
End: 2020-04-02

## 2020-04-02 PROCEDURE — 87635 SARS-COV-2 COVID-19 AMP PRB: CPT | Performed by: NURSE PRACTITIONER

## 2020-04-02 PROCEDURE — U0003 INFECTIOUS AGENT DETECTION BY NUCLEIC ACID (DNA OR RNA); SEVERE ACUTE RESPIRATORY SYNDROME CORONAVIRUS 2 (SARS-COV-2) (CORONAVIRUS DISEASE [COVID-19]), AMPLIFIED PROBE TECHNIQUE, MAKING USE OF HIGH THROUGHPUT TECHNOLOGIES AS DESCRIBED BY CMS-2020-01-R: HCPCS | Performed by: NURSE PRACTITIONER

## 2020-04-02 RX ORDER — WARFARIN SODIUM 1 MG/1
TABLET ORAL
Qty: 45 TABLET | Refills: 2 | Status: SHIPPED | OUTPATIENT
Start: 2020-04-02 | End: 2020-07-10 | Stop reason: SDUPTHER

## 2020-04-06 ENCOUNTER — TELEPHONE (OUTPATIENT)
Dept: URGENT CARE | Facility: CLINIC | Age: 65
End: 2020-04-06

## 2020-04-06 RX ORDER — OXYCODONE HYDROCHLORIDE 10 MG/1
10 TABLET ORAL 3 TIMES DAILY PRN
Qty: 90 TABLET | Refills: 0 | Status: SHIPPED | OUTPATIENT
Start: 2020-04-06 | End: 2020-04-09 | Stop reason: SDUPTHER

## 2020-04-07 ENCOUNTER — APPOINTMENT (OUTPATIENT)
Dept: ONCOLOGY | Facility: HOSPITAL | Age: 65
End: 2020-04-07

## 2020-04-07 ENCOUNTER — APPOINTMENT (OUTPATIENT)
Dept: LAB | Facility: HOSPITAL | Age: 65
End: 2020-04-07

## 2020-04-07 ENCOUNTER — OFFICE VISIT (OUTPATIENT)
Dept: ONCOLOGY | Facility: CLINIC | Age: 65
End: 2020-04-07

## 2020-04-07 DIAGNOSIS — C56.1 MALIGNANT NEOPLASM OF RIGHT OVARY (HCC): Primary | ICD-10-CM

## 2020-04-07 PROCEDURE — 99441 PR PHYS/QHP TELEPHONE EVALUATION 5-10 MIN: CPT | Performed by: INTERNAL MEDICINE

## 2020-04-07 RX ORDER — POTASSIUM CHLORIDE 1500 MG/1
TABLET, EXTENDED RELEASE ORAL
Qty: 150 TABLET | Refills: 5 | OUTPATIENT
Start: 2020-04-07

## 2020-04-07 NOTE — PROGRESS NOTES
Hematology/Oncology Outpatient Follow Up    PATIENT NAME:Tahira Zimmerman  :1955  MRN: [id number]  PRIMARY CARE PHYSICIAN: Noemy Queen MD  REFERRING PHYSICIAN: Noemy Queen MD    Chief Complaint   Patient presents with   • Follow-up   • Chemotherapy   • Follow-up     ovarian cancer       This is a tele-visit     HISTORY OF PRESENT ILLNESS:   1. Recurrent ovarian cancer diagnosis established in 2013.  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  The patient was treated with surgery and then postoperatively with adjuvant chemotherapy consisting of Carboplatin and Taxol.  Six months later, she had recurrence of disease intra-abdominally between the rectum and vagina, which was treated with intraabdominal peritoneal chemotherapy.  She had been free of disease since that time.  She reported feeling a mass in the left side of her abdomen approximately six months ago.  This gradually grew and in early 2013 she had her daughter feel the mass.  The daughter works for Dr. David Rogers and the patient at that time also complained of intermittent rectal bleeding.  Consultation with Dr. David Rogers was obtained.  The patient had a CT scan of the abdomen and pelvis performed on 13 revealing left lower quadrant mass measuring 9.7 x 9.3 x 6 cm demonstrating areas of dense calcification, soft tissue density and cystic density within it.  Stromal tumor is felt to be most likely a possibly fibroma.  It is generated with necrosis and calcification.  Possible palpable mass in the rectus muscle is seen with calcification and deformity of the rectus muscle and just below this a second mass intra-abdominally was seen measuring 2 cm in the greatest diameter suspicious for second intraabdominal tumor.  The mass separate from the largest mass measuring 2.6 cm in the dependent portion of pelvis is seen on  the right of the midline.  No retroperitoneal lymphadenopathy was seen.  No free air, free fluid or other abnormality was present.  The patient was scheduled for an outpatient colonoscopy, but had syncopal episode while sitting in the toilet the night before and was brought to the emergency room early in the morning by her daughter and son-in-law.  The patient had apparently stopped breathing for a few seconds and did not have a pulse, but started breathing before CPR could be initiated.  In the emergency room, the patient had an EKG revealing sinus rhythm nonspecific T-wave flattening; metabolic panel revealed a glucose of 148, creatinine of 1.3, CBC was normal.  She was treated with intravenous fluid.  The patient’s case was then discussed with Dr. Anabell Monique and patient was subsequently admitted to [facility].  The patient underwent a colonoscopy by Dr. David Rogers on 06/27/13 revealing sigmoid diverticulosis and grade II internal and external hemorrhoids, but no mass or colitis was seen.  CT-guided biopsy of the mass was performed on 06/27/13 as well revealing metastatic papillary adenocarcinoma with numerous psammoma bodies.  Pathology comment stated prior records were not available; however, with history of ovarian cancer the current biopsy would be consistent with metastases from that malignancy.  Oncology consult was obtained and I initially saw the patient on 06/27/2013 where the patient had claimed to have little bit of pain in the area of disease.  She reported being frequently constipated, denies any weight loss or fevers.  She did report having some night sweats, but thought that was because of her menopause.  On 06/27/13 metastatic workup including labs and CT scans were initiated.  CT scan of the chest on 06/27/13 revealed no acute disease in the chest.  A 1.4 cm size focal area of decreased density was noted in the lateral aspect of the right lobe of the liver consistent with a  benign cyst or hemangioma.  There was a very small hiatal hernia measuring 2.2 cm in diameter.  A CT scan of the head performed 06/27/13 revealed no acute intracranial abnormality noted.  CA-125 was 40 units/ml (0-35), CA 19-9 was 11.8 units/ml (0-35), CEA level was 0.8 ng/ml (0-3), TSH level was 1.09 IU/ml (0.34-5.6).  The patient was in workup for the syncopal episode, a carotid Doppler was performed on June 28 revealing an essentially normal study showing a reduction in diameter from 0-15% bilaterally.  A Holter monitor was completed on 06/27/13, which was unremarkable except for a few premature atrial contractions.  The patient was felt stable and subsequently was discharged home on 06/28/13.  Post discharge, the patient was seen at [facility] on 07/05/13 by Dr. Kathryn Thrasher who discussed treatment options with the patient including surgery.  The patient is in the office today 07/16/13 in follow up post hospitalization.  She is reporting that surgery is planned for July 30th of this month at .  · 7/30/13 to 8/3/13 - The patient was in [facility].  The patient was admitted for surgery for her recurrent ovarian cancer.  The patient had exploratory laparotomy, omentectomy, bowel resection, liver biopsy and appendectomy.  Pathology revealed of the omentum metastatic serous carcinoma of the transverse colon, segmental resection showed metastatic serous carcinoma involving mesenteric fat.  The liver wedge resection showed fibrosclerotic thickening of the hepatic capsule.  Benign liver parenchyma.  No evidence of metastatic tumor.  Appendix showed fibrous obliteration of the lumen.  Negative for metastatic carcinoma.  Rectum and sigmoid colon segmental resection showed metastatic serous carcinoma invading the colorectal mucosa uninvolved by tumor.  Vaginal mass showed metastatic serous carcinoma.  Margins are positive for tumor.  · 9/24/13 - Chemotherapy orders were  written for patient to start carboplatin 550 mg IV day one and Taxol 330 mg IV day one to be cycled every three weeks.  · 10/10/13 - The patient started cycle #1 of chemotherapy consisting of Carboplatin and Taxol.  · 09/25/13 -  is 10.6 normal.  · 10/01/13 - CT of the chest, abdomen and pelvis revealed new reticular nodular occupancy in the anterior segment of the right upper lobe, could reflect an inflammation or infectious etiology or could reflect unusual persistence of metastatic tumor.  New liver lesions, the smaller one appears to be implant on the surface of the liver, the larger may also represent an implant including the intrahepatic.  Several small mesenteric nodes seen throughout the abdomen and pelvis.  · 10/02/13 - Port placed by Dr. Sen.  · 10/24/13 - Orders written to start Neupogen daily and if more than three Neupogen are needed to give Neulasta after next chemo.  ANC is 0.15.  · 10/31/14 - Patient given cycle 2 of chemotherapy with Taxol and Carboplatin.  · 11/21/13 - The patient started cycle 3 of chemotherapy consisting of Carboplatin and Taxol.  · 11/26/13 - CT scan of chest, abdomen and pelvis revealed no evidence of active disease in the chest.  Reticulonodular opacity has resolved.  Metastatic lesion impressing upon the hepatic dome similar to prior exam.  No evidence of a change.  No evidence of new disease.  Postsurgical changes in the pelvis and throughout the retroperitoneum in the large bowel.  Finding containing anterior abdominal wall hernia containing fat tissue and enhancing vessels, 3 cm in diameter.  · 12/2/13 - Orders written to start Neupogen per protocol as well as Aranesp due to low hemoglobin and ANC.  ANC is 0.14.  Hemoglobin is 9.7.  · 12/11/13 - Orders written to hold chemotherapy until next week and decrease dose by 20% due to low platelet count.  Platelet count is 85,000.  · 12/16/13 - The patient received cycle 4 of Carboplatin and Taxol at a 20% dose reduction so  Taxol dose is 260 mg and Carboplatin is 440 mg.  · 12/16/13 - CA-125 12.6 (N).  · 12/19/13 - PET/CT scan.  Impression:  1. There is no evidence of hypermetabolism in the liver.  Specifically of the dominant lesion in or adjacent to the right hepatic dome that was seen and described on the recent CT study of 11/26/2013.  It is photopenic relative to the surrounding liver.  2.  There is no evidence of hypermetabolic soft issue mass lesion or free fluid in the abdomen or pelvis.  3.  The tonsillar tissues are slightly enlarged and have moderate activity level.  This maybe physiologic.  Correlation with oral cavity, examination is recommended.  4.  Mild amount of activity in the right level II jugular lymph chain without focally enlarged lymph nodes is of questionable significance.  · 1/6/13 - The patient received cycle 5 of chemotherapy with Taxol and Carboplatin.  · 1/27/14 - The patient started on cycle 6 of Taxol and Carboplatin.  · 2/18/14 - CT of the abdomen and pelvis with contrast; stable lesions impressing upon the hepatic dome, unchanged compared to the prior exam.  Multiple anterior abdominal wall hernias re-demonstrated.  Those above the umbilicus contain omental fat.  Below and to the left of the umbilicus as anterior abdominal hernia containing omental fat in nondilated small bowel which is larger than seen previously.  · 2/20/14 - The patient received cycle 7 of chemotherapy with Taxol and Carboplatin.   · 3/11/14 - Order written to discontinue chemotherapy due to patient has completed 7 cycles of chemotherapy and CT scans suggest possible remission.  · 3/11/14 -  11.0 (N).  · 06/05/14 - CT scan of the chest abdomen and pelvis with contrast: There are multiple anterior abdominal wall hernias.  There are 2 larger anterior abdominal wall hernias at the level of the transverse colon, which contain omental fat.  There are at least 2 small anterior abdominal wall hernias that contain omental fat.  There  is a moderate sized anterior abdominal wall hernia at the level of the umbilicus, eccentric to the left, which contain non-dilated bowel.  Interval decrease in the size of two deposit impressing on the liver.  The third tiny deposit has been stable.  · 8/19/14 -  10.4 (N).  · 12/19/14 -   10.0 (N)  · 1/7/15 - CT chest, abdomen and pelvis with stable appearance of the chest with several micronodules. Small hiatal hernia, slightly larger than previous exam, increased from 1.5 to 2.5 cm in diameter. Bochdalek hernia unchanged. Multiple hepatic lesions. The two dominant lesions at the right hepatic dome had decreased in size from previous. The remaining tiny nodules measured 3-5 mm in diameter and were unchanged. There was no evidence of new intra-abdominal or pelvic mass or free fluid. There were multiple anterior abdominal wall hernias which had increased in size.   · 7/27/15 - Comprehensive metabolic panel with no significant abnormalities. CA-125 of 21 (0-35).   · 10/26/15 - WBC 6, hemoglobin 13, platelet count 204,000. Heart rate 47. CA-125 of 20 (0-35).    · 2/18/16 - CT chest with contrast with stable micronodular density seen in the lungs without significant change from prior exam. CT abdomen and pelvis with regression of liver lesions with no new evidence of metastasis. Multiple ventral hernias again noted without significant change from prior exam. Creatinine 0.9 (0.6-1.3).    · 2/25/16 - Patient hospitalized at [facility] between 2/25/16 and 2/27/16 with pyelonephritis secondary to E-coli. She was given two days of IV Rocephin and then placed on oral Levaquin. She was asked to hold her Coumadin, but then had nausea and vomiting because of Levaquin and stopped the Levaquin also. Her CA-125 was 32 (0-35) and CEA 1.3 (0-5). Her urine grew >100,000 E-coli. CT of the abdomen and pelvis was done which revealed 4.1 x 1.8 cm sized mild ovoid area of decreased density in the liver parenchyma  along the anteromedial aspect of the dome of the right lobe of the liver, unchanged significantly since the previous study of 2/18/16. There was suspicion of a very small pericardial effusion. There was suspicion of a small hiatal hernia. There were several hernias in the anterior abdominal wall. A 3.5 x 3.1 cm incised well-circumscribed abnormal density in the left retroperitoneal fatty soft tissue at the level of the left iliac crest was suggestive of tumor recurrence. There was an abnormal density in the left retroperitoneal soft tissues in the left flank that partially surrounded the left kidney and extended downward to the left pelvic area. Diverticulosis of the distal descending colon and sigmoid colon were seen.     · 3/8/16 - Patient prescribed Bactrim DS 1 p.o. b.i.d. for 10 days for pyelonephritis, which was partially treated with Rocephin and Levaquin. Urine with 40,000 E-coli treated with Macrobid for 7 days.  of 20 (0-35).    · 3/31/16 - PET scan with area of low density anteriorly in the right lobe of the liver with no significant radiopharmaceutical uptake to suggest malignancy. Multiple round soft tissue density masses showing increased radiopharmaceutical activity and multiple anterior abdominal wall hernias.   · 5/10/16 - Patient complains of venous enlargement of the left chest wall around the port. Has not been seen by  yet, but has an appointment on 5/23/16. Complained of a cough.   · 5/12/16 - Infusaport contrast study with no evidence of fibrin sheath. Chest x-ray with mild cardiac prominence.   · 5/23/16 - Patient seen in consultation by Sheng Bowman M.D. at [facility] and a chemotherapy trial recommended.   · 6/1/16 -   of 27.1 (0-35).    · 6/14/16 - WBC 5.71, hemoglobin 12.4, platelet count 188,000. Patient is scheduled for surgery at  on 6/16/16 after further discussion with  physicians. Discussed adjuvant chemotherapy.   · 6/16/16 - Excisional biopsy of  abdominal wall mass performed by Sheng Bowman M.D. at [facility] with pathology revealing metastatic high-grade papillary serous carcinoma.   · 7/7/16 - Received a call from the patient that Tramadol was not working and that she was given Norco #24 after her biopsy at  which did help her pain. Patient prescribed Norco 5/325 one p.o. q. 4-6 hours p.r.n. #60 with no refills. Echocardiogram with left ventricular inferior wall hypokinesis with ejection fraction of 50-55%.   · 7/8/16 - Patient seen in consultation by Sheng Bowman M.D. in consultation at  for metastatic high-grade papillary serous carcinoma diagnosed by excisional biopsy on 6/16/16 with carcinomatosis and patient not a surgical resection candidate. Genetic sequencing and susceptibility testing of tumor was ordered. Biopsy was done of anterior abdominal wall.   · 7/7/16 - Echocardiogram with left atrial enlargement. Mild mitral regurgitation. Left ventricular proximal inferior wall hypokinesis with ejection fraction of 50-55%.   · 7/11/16 - While waiting for genomics results, in order not to delay treatment further, order written for Taxotere 60 mg/M2 IV over one hour followed by Carboplatin AUC 5 over one hour, cycles to be repeated every three weeks.  Comprehensive metabolic panel normal.  26 (0-35).    · 725/16 - Pain contract signed for use of Norco.   · 8/5/16 - Patient stated that she experienced a red rash and was itchy post taking Norco. Norco discontinued and Tramadol refilled.   · 8/16/16 - Patient started cycle 1 chemotherapy. WBC 7.1, hemoglobin 11.2, MCV 88.7, platelet count 94,000. Patient complained of nausea for a week post chemo. Already getting Emend, Aloxi and Decadron. Added Omeprazole 40 mg daily orally.   · 8/17/16 - Urine culture with >100,000 E-coli.   · 8/25/16 - Cycle 2 chemotherapy given.   · 9/9/16 - Magnesium 1.3, replaced intravenously. Potassium 3.4 (3.6-5.1), increased oral potassium  supplementation.    · 9/16/16 - Cycle 3 chemotherapy given.   · 9/19/16 - Comprehensive metabolic panel with no significant abnormality.   · 9/20/16 - CT abdomen and pelvis with evidence of progressive metastatic disease in the abdomen and pelvis with interval enlargement of several soft tissue attenuations and subcutaneous nodules in the anterior abdominal wall. Interval enlargement of a left pelvic soft tissue attenuation mass. A lentiform area of low attenuation in the dome of the liver stable in size. Multiple anterior abdominal wall hernias containing fat and/or bowel. Marked pelvic floor laxity. CT chest negative for evidence of metastatic disease. Tiny pulmonary nodules in the right lung stable since 2013 consistent with a benign etiology.   · 9/23/16 - Macrobid 100 mg p.o. b.i.d. x7 days prescribed for UTI. Current chemotherapy discontinued after discussion and new chemotherapy orders written. Carboplatin AUC-5 IV day 1 and Doxil (Liposomal Doxorubicin) 30 mg/M2 IV day 1 to cycle q. 21 days.  of 18 (0-35). Magnesium 1.6, replaced intravenously. Comprehensive metabolic panel with potassium 3.4 (3.6-5.1).     · 10/13/16 - Patient started on cycle 1 of Carboplatin and Liposomal Doxorubicin followed by Neulasta.   · 11/3/16 - Cycle 2 Carbo and Doxil given.   · 11/17/16 - Magnesium 1.0, replaced intravenously. Oral potassium supplement increased.   · 11/29/16 - CT chest with small non-calcified right pulmonary nodules unchanged. Benign bone island in the midthoracic vertebral body along with benign bony hemangiomas in the middle thoracic vertebral bodies. CT abdomen and pelvis with mild progressive metastatic disease from CT of September 2016. Anterior abdominal wall mass superiorly mildly increased in size with new area noted as described measuring 3 x 1.7 cm. 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 Stable area of decreased uptake in the dome of the liver  not hypermetabolic on recent PET scan, likely representing cyst or focal fatty infiltration. Stable small hiatal hernia, fat-containing Bochdalek’s hernia and anterior abdominal wall hernias with stable pelvic floor relaxation.    · 12/1/16 - Cycle 3 chemotherapy given.   · 12/8/16 - Current chemotherapy discontinued and patient started on Gemcitabine 1000 mg/M2 IV days 1, 8, 15 q. 28 days.   · 12/22/16 - Patient seen in followup by Juliana Roy M.D. at the pain clinic, treated with Oxycodone and Lyrica. Transaminases normal. Patient started cycle 1 chemotherapy.   · 12/29/16 - Magnesium 1.1 (1.8-2.5), replaced intravenously.   · 1/5/17 - Cycle 1 day 15 chemotherapy held as patient leaving for vacation to [place]. Chemotherapy dose decreased by 20%. Patient complains of diarrhea for which Imodium prescribed.   · 1/11/17 - BRCA-1 and BRCA-2 negative.   · 1/12/17 - Echocardiogram with ejection fraction 60% or greater.   · 1/19/17 - Comprehensive metabolic panel with no significant abnormality. Patient started cycle 2 chemotherapy.   · 2/2/17 - Urine with >100,000 E-coli treated with Cipro 500 mg p.o. b.i.d. x1 week.   · 2/6/17 - Magnesium 1.1 (1.8-2.5), replaced intravenously.    · 2/23/17 - Patient started cycle 3 chemotherapy.   · 2/27/17 - CT chest with interval development of too numerous to count very small pulmonary nodules, ill-defined ground glass opacities concerning for development of pulmonary metastatic disease. Stable left-sided chest port. CT abdomen and pelvis with stable appearance of multiple small soft tissue densities within the abdomen near midline subcutaneous fat of the abdominal wall. Interval decrease in size of largely calcified left pelvic mass and multiple fat in bowel containing small ventral hernias.   · 3/6/17 - Pulmonary consultation obtained for lung nodules. Discussed CT results with patient. Decision made to continue present chemotherapy until further lung evaluation as  abdominal disease better.  of 18 (0-35).    · 3/16/17 - Patient started cycle 4 chemotherapy, but treatment held from day 8 onwards because of hospitalization.   · 3/19/17 - Patient was hospitalized at [facility] between 3/19/17 and 3/25/17 with chest pain. Chest x-ray had revealed increased mild bibasilar airspace disease. Troponin I and EKG’s were negative. INR was elevated. Patient was treated with antibiotics. EGD was performed revealing small hiatal hernia and esophageal dilatation was performed. Bronchoscopy was performed also with no intrabronchial lesions identified. IgA was 51 (), IgG 320 (600-1500) and IgM 19 (). Lexiscan nuclear stress test had no evidence of reversible myocardial ischemia with normal left ventricular ejection fraction of 58%. CT chest had development of patchy bilateral ground glass opacities most pronounced involving the left upper lobe and bilateral lower lobes. Multiple tiny bilateral pulmonary nodules were less conspicuous. The patient underwent a bronchoscopy by Jerica Esparza M.D. with right upper lobe bronchoalveolar lavage revealing benign squamous cells and bronchial cells with pulmonary macrophages present. There was mild acute inflammation. Negative for malignant cells. Prilosec was changed to Famotidine for hypomagnesemia.    · 4/6/17 - Magnesium 1.2 (1.8-2.5). Comprehensive metabolic panel with no significant abnormality. Patient seen in follow-up by Fito Ram M.D. at [facility]. Treated with Lyrica and Oxycodone.    · 5/4/17 - Patient complains of a rash itching on her right upper arm. Eczematous rash noted and patient prescribed Cyclocort 0.1% b.i.d. Magnesium infusions continued with each chemo. Order written to resume chemotherapy.   · 5/16/17 - Cycle 5 chemotherapy started.   · 5/26/17 - Magnesium 1.4 (1.8-2.5), replaced intravenously. Potassium 2.9 (3.6-5.1), KCL increased to 20 mEq three in the morning and three in the evenings.   · 5/30/17  - PET scan with remaining metabolic activity within the nodular areas. The suggested mass which is partially calcified and necrotic within the left aspect of the pelvis seems slightly larger with increased metabolic activity. There was more calcification in these areas compared to prior study, suggesting inflammation.      · 6/8/17 - Patient complaining of shortness of breath. Does take HCTZ at home. Chest x-ray ordered and chemotherapy continued along with weekly magnesium replacement. Chest x-ray with no acute cardiopulmonary abnormalities.   · 6/13/17 - Patient received cycle 6 of chemotherapy.   · 7/31/17 - WBC 5.0, hemoglobin 11.2, MCV 89.7, platelet count 217,000. Patient is to resume chemotherapy starting with cycle 7 on Wednesday of this week.  of 19 (<35). Potassium 3.3 (3.5-5.3).     · 8/2/17 - Patient began cycle 7 of chemotherapy.   · 8/30/17 - Patient started cycle 8 chemotherapy.   · 9/5/17 - Patient seen in followup at Pain Management by Fito Ram M.D. and continued on treatment with Oxycodone and Lyrica.   · 9/13/17 - Patient was hospitalized at [facility] for a day with dizziness secondary to dehydration, hypokalemia and anemia. She was given 1 unit of packed red blood cells and electrolytes were replaced. Chest x-ray revealed a subtle opacity in the left lateral lung base favored to represent atelectasis. Magnesium 1.3 (1.5-2.5), patient continued on replacement.    · 9/22/17 - Chemotherapy and magnesium replacement continued with decrease in chemotherapy dose by 10% secondary to cytopenias.   · 9/25/17 - Cycle 9 chemotherapy started.   · 10/12/17 - CT of the chest with contrast showed several tiny pulmonary nodules. One within the right lower lobe appears new from prior exams. Two adjacent foci of nodularity within the medial right lower lobe suggestive of minor infectious or inflammatory process. CT of the abdomen and pelvis with contrast which showed soft tissue nodules along the  anterior abdominal and pelvic walls unchanged from previous scan. Also a partially calcified mass within the left pelvis unchanged. No acute process identified within the abdomen or pelvis. Several left paracentral ventral hernias unchanged. No evidence of acute bowel obstruction.   · 10/16/17 - Order written for Levaquin 500 mg p.o. daily as the patient states that she has had a productive cough, fever and chills and with the results of the CT scan showing a possible infectious versus inflammatory process. Order also written to continue chemotherapy and magnesium replacement as previously prescribed.   · 10/23/17 - Cycle 10 chemotherapy started.   · 11/19/17 - Patient went to the Emergency Room at [facility] complaining of right lower extremity redness and fever for a day. Venous Doppler had no evidence of a DVT and patient was discharged home on Clindamycin.   · 11/21/17 -  of 17 (0-35).   · 12/4/17 - Cycle 11 chemotherapy started.   · 12/20/17 - PET scan with multiple hypermetabolic soft tissue nodules abutting the anterior abdominal wall, stable from previous examination. Peripherally calcified left lower quadrant mass near the ascending colon stable in size demonstrating no hypermetabolic uptake. Previously had maximum SUV of 7. Right medial basilar pulmonary nodules resolved.   · 12/22/17 - CT left lower extremity with soft tissue swelling with skin thickening present along the anterior and medial aspect of the leg, slightly more pronounced around the palpable marker seen within the upper medial aspect of the lower extremity.   · 12/26/17 - Elastic stockings prescribed for lower extremity edema.   · 1/8/18 - Patient hospitalized at [facility] between 1/8/18 and 1/11/18 with fever of up to 101 degrees and painful rash on the lower extremities of several weeks’ duration. She was found to be hypokalemic and MRI of the lower extremities revealed thickening and swelling. Chest x-ray had no acute cardiopulmonary  abnormality. Skin biopsy was performed by Surgery revealing stasis dermatitis and no evidence of malignancy. Infectious Disease consultation was obtained and IV antibiotics were stopped. Blood cultures had no growth.   · 1/22/18 - Patient asked to start wearing elastic stockings which she has not started yet. She was given a prescription for Dyazide 1 p.o. daily.   · 2/26/18 - Ordered chemo to resume again. Patient unaware that she was supposed to resume chemo after her last visit in January. Continue magnesium infusions on day 1, 8 and 15. Prescribed Levaquin 500 mg p.o. daily x10 days for productive cough x1 week. History of viral illness consistent with influenza.  of 18 (0-35). Chest x-ray with no acute process.    · 3/5/18 - Cycle 12 chemotherapy started.   · 3/26/18 - Options discussed with patient. Decision made to discontinue IV Gemzar and orders written to start on Niraparib (Zejula) 300 mg p.o. daily as maintenance therapy.   · 4/11/18 - Niraparib discontinued due to insurance denial. Orders written to start Carboplatin-AUC 5 IV day 1 cycling every 21 days. Orders written for Neulasta 6 mg subcutaneous kit day 1 with palliative treatment intent and expected duration of treatment three months.   · 4/12/18 - CT chest, abdomen and pelvis with contrast revealed numerous tiny centrilobular nodules in the lungs favored to reflect infectious or inflammatory process. Nodules ranged 1-3 mm in size and are new from prior studies with metastatic disease not entirely excluded. Stable small hiatal hernia. Interval progression of metastatic disease involving the subcutaneous tissues at the ventral abdominal wall. Multiple soft tissue density nodules previously shown to be hypermetabolic on PET scan. New small crescent of low attenuation material along the periphery of the spleen with similar finding at the dome of the liver. Findings are concerning for peritoneal metastases. Density of the dome of the liver  remained unchanged from multiple prior studies. Stable calcified mass in the left pelvic sidewall. Urinary bladder wall thickening.   · 4/23/18 - Cycle 1 chemotherapy with Carboplatin administered along with Neulasta injection.   · 4/26/18 - Neulasta discontinued due to insurance denial.   · 5/14/18 - Patient received cycle 2 Carboplatin.   · 6/5/18 - Cycle 3 day 1 chemotherapy with Carboplatin administered.   · 6/20/18 - CT chest, abdomen and pelvis at [facility] showed re-demonstration of soft tissue mass in the ventral wall hernia left of the midline as well as the dome of the liver, likely representing metastatic disease similar as compared to the prior study. Additional calcified lesions present in the upper left hemipelvis and along the anterior abdominal wall to the right of the midline also likely representing metastatic disease. No definite new lesions were identified. Moderate to large stool burden likely related to mild constipation was present. No suspicious lung nodules were identified. The previously-described ground glass nodules appeared to have resolved. There was a stable subpleural nodule in the right upper lobe measuring 3 mm present since 2014 without significant changes.   · 6/26/18 - Cycle 4 day 1 Carboplatin administered with addition of Neulasta for febrile neutropenia prophylaxis.   · 6/29/18 - Reviewed CT scans with patient. Orders written to discontinue Carboplatin and Neulasta and plan to start Niraparib 300 mg by mouth daily as maintenance therapy. Prescribed Amlodipine 2.5 mg p.o. daily for chemo-induced hypertension.   · 7/18/18 - Orders written to increase weekly Magnesium Sulfate to 3 g IV weekly due to continued hypomagnesemia.   · 8/1/18 - Patient reports she has not received Niraparib (Zejula) to date.   · 8/30/18 - Patient’s phone was not working so though Niraparib approved, the drug company had not been able to get ahold of her. Patient supplied with drug company  number so that she can start taking the pills.   · 9/6/18 -  of 20 (N).   · 9/18/18 - Urinalysis done for dysuria and back pain. Urine culture grew 20,000 to 50,000 colonies of urogenital ruy. Prescribed Bactrim DS one tablet twice daily for one week.   · September 2018 - Patient initiated on Zejula 300 mg p.o. daily.   · 10/1/18 - WBC 3.5, hemoglobin 12, platelet count 74,000. Niraparib (Zejula) dose held and then resumed when platelets above 100,000 at a dose of 200 mg daily.   · 10/15/18 - Patient received Niraparib (Zejula) at 200 mg p.o. daily with a platelet count of 198,000.   · 11/7/18 - WBC 6, hemoglobin 11.6, MCV 96.2, platelet count 137,000. Patient claims to have stopped taking Niraparib a few days prior because of cough. Asked to resume taking it. Ciprofloxacin 500 mg p.o. twice daily for one week for bronchitis.   · 12/3/18 - Magnesium Sulfate infusions changed to every two weeks, to send mag level before and give 3 grams. DC’d Magnesium Oxide and started Magnesium Plus Protein two pills twice daily.   · 1/4/19 - CT chest, abdomen and pelvis with new patchy nodular areas of airspace consolidation within the bilateral upper lobes, left greater than right, favored to be infectious or inflammatory. Interval enlargement of the peritoneal soft tissue masses within the pelvis compatible with progression of disease. Enlarging ventral hernia now containing multiple loops of small bowel and colon.   · 1/7/19 - Patient has not been coming in for weekly magnesium replacement as nursing has not been able to get ahold of her. Results of CT’s discussed with patient. Decision made to change her to IV chemotherapy with Carboplatin AUC-5 day 1 and pegylated liposomal Doxorubicin (Doxil) 30 mg/M2 IV day 1 to cycle every four weeks. Patient made aware of the limited choices of her treatment available.   · 1/31/19 - Echocardiogram showed LVEF 50-55% and otherwise normal echo Doppler study.   · 2/4/19 - New  chemotherapy not initiated to date with difficulty contacting patient for echocardiogram. Neulasta On-Pro 6 mg ordered with chemotherapy due to extensive prior exposure to chemotherapy, increasing risk of febrile neutropenia, history of neutropenic events on prior chemotherapy regimens.   · 2/15/19 - Patient started cycle 1 chemotherapy with Neulasta support.   · 3/6/19 -  of 28 (0-35).   · 3/15/19 - Cycle 2 Carboplatin with Liposomal Doxorubicin (Doxil) and Neulasta support initiated.     · 4/10/19 - Patient was requested to followup with Dr. Queen regarding hypotension and blood pressure medication dosing. Patient appears to be tolerating treatment well with cytopenias not requiring dose adjustments. No Doxil-related skin or mucus membrane toxicities or other significant toxicities to date.   · 4/12/19 - Cycle 3 chemotherapy given.   · 4/16/19 - CT chest, abdomen and pelvis with no evidence of active metastasis to the chest. Several tree-in-bud nodules within the periphery of the inferior right upper lobe likely infectious or inflammatory. Disease burden within abdomen and pelvis stable to slightly increased from prior CT. Specifically, a soft tissue mass along the right rectus muscle slightly increased in bulk. Multiple ventral hernias, some containing loops of bowel, with no evidence of acute bowel obstruction.   · 5/8/19 - Discussed CT results with patient. Overall stable disease and would like to continue same treatment. Dove soap and Hydrocortisone Cream p.r.n. prescribed for scattered rash on back.   · 5/10/19 - Cycle 4 Carboplatin and Liposomal Doxorubicin initiated.   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 It was ER positive, HER2/zuleima negative, PD-L1 negative. There was TOPO1 positivity and TUBB3 positivity. TMB was low (two mutations per megabyte MUTS/MB) and MSI was stable. There were 13 therapies considered with increased  benefit. The 13 therapies with increased benefit included Fulvestrant, Irinotecan, Letrozole, Topotecan, Anastrazole, Exemestane, Tamoxifen, all of which were referenced to [facility] as well as Abemaciclib, Everolimus, Gefitinib, Palbociclib, Ribociclib and Toremifene.     · 6/5/19 echocardiogram with preserved LV systolic function with EF around 60%.  · 6/7/19 cycle 5 chemotherapy with Neulasta support given.  Patient continued on mag oxide 400 mg p.o. twice daily and weekly IV 3 g mag sulfate infusions for persistent hypomagnesemia.  · 7/5/2019- cycle 6 chemotherapy with carboplatin and doxorubicin with Neulasta port initiated.  Ca125:  21 (0-35).  · 7/23/2019-CT chest abdomen and pelvis compared to CT from 4/16/2019 revealed multiple small pulmonary nodules unchanged from prior examination within the right upper and left lower lobes.  Complex ventral hernia similar to prior exam.  Soft tissue nodule along the right lateral margin of the right of the midline measuring 12 x 10 mm unchanged in size.  Irregular soft tissue nodular thickening along the margin of the most inferior aspect of the complex ventral hernia with thickness measuring up to 13 mm similar to prior examination.  Extensive calcified and soft tissue mass within the left lower quadrant decrease in size now measuring 2.6 x 2.3 cm previously 3.0 x 2.5 cm.  Partially calcified mass involving the right rectus sheath measuring 3.1 x 2.7 cm previously measured 3.3 x 2.9 cm.  Densely calcified mass measuring 2.1 x 1.6 cm unchanged previously measured 2 x 1.7 cm.  Right external iliac chain lymph node measuring 9 mm previously measured 5 mm.  · 8/2/2019 cycle 7 chemotherapy given.  · 8/30/2019-cycle 8 chemotherapy with carboplatin and doxorubicin with Neulasta support given.  · 9/27/2019 cycle 9 chemotherapy given.  · 10/1/19 - Echocardiogram showed Normal LV size and contractility EF of 60-65%. Normal RV size. Borderline left atrial enlargement. Aortic valve,  mitral valve, tricuspid valve appears structurally normal, no significant regurgitation seen.No pericardial effusion seen. Proximal aorta appears normal in size.  · 10/18/19 - Magnesium 1.5 (1.8-2.5).  IV magnesium replacement continued.  · 10/25/2019 cycle 10 chemotherapy given.  · 10/29/2019 patient had CT scan of the chest abdomen and pelvis-there is a new 2 mm nodule in the left lower lobe with a stable 2 mm nodule in the left lower lobe.  Follow-up in 6 months was recommended.  Stable multiple soft tissue density and calcified nodules within the ventral abdominal wall and left retroperitoneal fat consistent with nonviable metastatic disease.  These nodules have either decreased in size or stable.  · 11/22/2019 10 received cycle 11 of chemotherapy with Doxil and carboplatinum with Neulasta  · 12/8/2019 to 12/11/2019 patient was admitted to the hospital for neutropenic fever.  · 12/20/2019 patient received cycle 12 of chemotherapy with Doxil and carboplatinum with Neulasta  · 1/13/20: 2 D echo was normal with EF 56% to 60%  · 1/24/20-Patient received cycle 13 of combination chemotherapy with carboplatinum and Doxil  · 2/3/2020 patient had a  which was 25.4  · 2/21/2020-patient received cycle 14 of chemotherapy with carboplatinum and Doxil  · 3/6/2020 patient had CT scan of the chest, abdomen and pelvis this revealed a 3 mm nodule in the left lower lobe present on prior CT of the abdomen unchanged from July 2019.  Stable 3 mm right upper lobe nodule.  There is a lymph node in the AP window measuring 8 x 9 mm prominent left hilar lymph node measuring 12 mm previously was 7 x 7 mm no significant adenopathy was seen in the abdomen there is an area of irregular soft tissue in the left paramidline ventral hernia which is stable measuring 5.7 cm partially calcified mass in the right rectus measuring 3 x 2.5 cm which is also stable the prominent lymph nodes in the left mid hilum and mediastinum may be reactive or  metastatic disease       2. Deep vein thrombosis of left upper extremity diagnosis established in October of 2013.  · 10/16/13 - Venous Doppler of left upper extremity.  Impression:  Deep vein thrombosis involving the subclavian, axillary and brachial veins on the left side, superficial vein thrombosis involving the left basilic vein.  · 10/16/13 - Order written for Lovenox 120 mg subcutaneously daily until INR is in therapeutic range.  Order written for Coumadin 5 mg p.o. daily.  The patient is to follow PT and INR protocol.  · 5/20/16 - Venous Doppler bilateral lower extremities normal.  · 7/30/19 - Patient continued on Coumadin following the INR protocol.      3. Anemia diagnosis established in November 2013 and pernicious anemia diagnosis established March 2016.   · 11/15/13 - Hemoglobin is 7.8.  · 12/2/13 - Orders written to start Aranesp 200 mg subcutaneous every two weeks due to low hemoglobin secondary to chemo induced anemia.  Hemoglobin is 9.7.  Anemia workup is ordered.  · 12/03/13 - Ferritin 179.8 (N), folic acid 8.3 (N), vitamin B12 314, iron 104 (N), TIBC 298 (N), iron saturation 35 (N), Reticulocyte count 0.88 (N).  EPO level 124 (N).  Haptoglobin 22 (H).  · 10/22/14 - Hemoglobin 12.5, hematocrit 37.3, MCV 91.6.  · 10/23/14 - Anemia resolved.   · 3/8/16 - WBC 5.8, hemoglobin 11.7, MCV 90.3, platelet count 245,000. Vitamin B12 of 189 (180-914), ferritin 61 (), iron 91 (), TIBC 345 (228-428).   · 3/15/16 -  ().        · 3/22/16 - Intrinsic factor antibody positive, antiparietal antibody negative. Vitamin B12 at 1000 mcg IM weekly started, but the patient after receiving first dose on 3/22/16 did not come back for injections until 4/13/16.   · 4/13/16 - WBC 5.1, hemoglobin 12.5, MCV 87.9, platelet count 201,000. Patient given B12 injection and then continued at home.   · 5/10/16 - WBC 5.1, hemoglobin 12.5, platelet count 201,000.   · 6/14/16 - Monthly Vitamin B12 injections  continued at home.   · 7/11/16 - WBC 5.48, hemoglobin 12.7, MCV 86.2, platelet count 200,000.   · 8/16/16 - Patient continued on monthly Vitamin B12 injections at home.   · 1/5/17 - WBC 2.6 with 38% neutrophils, 56% lymphocytes, 5% monocytes, hemoglobin 10.5, .3, platelet count 63,000. Patient continued on Vitamin B12 injections 1000 mcg IM monthly at home.   · 3/20/17 - Retic 0.41 (0.5-1.5), creatinine 1.0 (0.4-1.0). Iron 12 (), TIBC 270 (228-428), ferritin 259 (), haptoglobin 340 (), TSH 0.43 (0.34-5.6), folate 6.0 (5.9-24.8). Serum protein electrophoresis revealed decreased gammaglobulins. Serum EDU had no monoclonal gammopathy identified. Stool Hemoccult was negative.   · 6/8/17 - WBC 6.3, hemoglobin 8.3, MCV 92.4, platelet count 50,000. Procrit 40,000 units subq weekly added for chemotherapy-induced anemia. Patient continued on Vitamin B12 at 1000 mcg IM monthly at home.   · 7/5/17 - Iron 33 (), TIBC 328 (228-428), ferritin 211 (), TSH 2.46 (0.34-5.6).       · 7/31/17 - WBC 5.0, hemoglobin 11.2, MCV 89.7, platelet count 217,000. We will continue with the Procrit 40,000 units subq weekly for chemo-induced anemia when the hemoglobin falls below 10.0 and the patient is also to continue with the Vitamin B12 at 1000 mcg IM monthly at home. Creatinine 1.24 (0.5-0.99).    · 8/28/17 - WBC 9.6, hemoglobin 8.7, MCV 93.7, platelet count 133,000. Patient is to continue with the Procrit 40,000 units subq weekly and will receive that today. She is also to continue with the Vitamin B12 at 1000 mcg IM monthly at home.  · 10/16/17 - WBC 7.5, hemoglobin 9.6, MCV 98.4, platelet count 195,000. Patient is to continue with Procrit 40,000 units subq weekly and will receive Procrit today.   · 1/1/18 - Creatinine 1.3 (0.4-1.0).    · 2/19/18 - Procrit given for hemoglobin 9.9.   · 2/26/18 - Continue Procrit 40,000 units weekly p.r.n. hemoglobin <10. Continue Vitamin B12 at 1000 mcg IM monthly at  home.   · 3/26/18 - Hemoglobin 8.3. Procrit dose increased to 60,000 units weekly. Iron 29 (), TIBC 306 (228-428), and iron sat 9% (15-50). Iron 29 (), TIBC 306 (228-428), iron saturation 9% (15-50).   · 3/27/18 - Order written to start Ferrous sulfate 325 mg p.o. b.i.d.    · 4/2/18 - Stool heme negative x3.   · 4/30/18 - Patient had not started Ferrous sulfate 325 mg p.o. b.i.d. and verbalized understanding to do so and to notify the office if side effects are not tolerable.   · 5/24/18 - Patient was hospitalized at [facility] between 5/24/18 and 5/26/18 with dark tarry stool. INR was subtherapeutic. She was given IV Protonix and Protonix 40 mg daily. She underwent an EGD by David Rogers M.D. revealing small hiatal hernia, small submucosal nodule near the cardia, erosive gastritis. Pathology on gastric antrum and body biopsy revealed iron pill gastritis and no evidence of Helicobacter pylori. She then underwent a colonoscopy revealing diverticulosis, hemorrhoids and surgical changes from previous resection. It was recommended to consider outpatient M2A if hemoglobin continued to drop.   · 6/4/18 - Order written to discontinue iron pills and to use Injectafer 750 mg IV days 1 and 8 for low iron sats. Order written for Procrit 40,000 units subq weekly. Iron 65 (), TIBC 328 (228-428), iron saturation 20% (15-50), ferritin 123 ().   · 6/29/18 - Discussed patient’s intolerance of oral iron due to gastritis. Oral iron discontinued with plans for Injectafer should iron level drop in the future.   · 11/7/18 - Patient claims not to be taking Vitamin B12 injections at home. Asked to resume those.   · 12/3/18 - Patient reports she has not been taking her B12 injections for a couple of months. She needs some syringes and needles for that.   · 2/4/19 - Patient is uncertain if she is currently taking Ferrous Sulfate or not. Patient with history of intolerance and will follow hemoglobin.   · 5/8/19 -  Ferritin 64 ().  · 8/27/2019- iron 52 (), iron saturation 14% (15-50), TIBC 364 (228-420), and ferritin 74 ().  Injectafer 750 mg IV day 1 and 8 due to oral iron intolerance ordered.  · 8/30/2019- Injectafer 750 mg IV day 1 given.  Hemoglobin 10.8.  At the end of the infusion heart rate was 48 and the patient reported mild dizziness.  Patient was sent to the ED for evaluation with symptoms resolving rapidly.  Chest x-ray and CT head were negative for acute findings.  · 9/6/2019-Injectafer 750 mg IV day 8 given without adverse effects.  Hemoglobin 9.8.   · 9/25/19 - Iron 85 (). Iron saturation 25 (15-50). TIBC 335 (228-428). Ferritin  694 ().  Hemoglobin 10.3.  · 10/25/2019 hemoglobin 9.7.  Patient started on Retacrit 40,000 units subcu weekly.    Past Medical History:   Diagnosis Date   • Anemia 2013   • Cervical disc disorder 2013    Herniated disc, pinched nerve   • Clotting disorder (CMS/HCC) 2013    Low platelets from chemo   • Colon polyp 2013   • Deep vein thrombosis (CMS/HCC) 2013   • Diverticulosis 2013   • GERD (gastroesophageal reflux disease) 2016   • HL (hearing loss) 2016    I need hearing aids   • Hyperlipidemia 2013    Need my cholesterol rechecked   • Hypertension    • Joint pain 2013    Shoulder pain, torn rotator cuff   • Low back pain 2013   • Neuromuscular disorder (CMS/HCC) 2015    Shingles, pinched nerves in my neck   • Osteopenia 2014   • Osteoporosis    • Ovarian cancer (CMS/HCC)    • Pneumonia 2010    Have had it several times   • Spinal stenosis 2013    Cervical   • Urinary tract infection 2013    Have had several utis       Past Surgical History:   Procedure Laterality Date   • COLON SURGERY     • COLONOSCOPY  05/26/2018   • EXPLORATORY LAPAROTOMY     • HERNIA REPAIR     • HYSTERECTOMY     • OOPHORECTOMY           Current Outpatient Medications:   •  atorvastatin (LIPITOR) 20 MG tablet, Take 20 mg by mouth every night at bedtime., Disp: , Rfl: 3  •   "cyanocobalamin 1000 MCG/ML injection, Inject 1,000 mcg into the appropriate muscle as directed by prescriber Every 28 (Twenty-Eight) Days., Disp: , Rfl:   •  famotidine (PEPCID) 40 MG tablet, TAKE 1 TABLET BY MOUTH EVERY MORNING BEFORE BREAKFAST, Disp: 90 tablet, Rfl: 1  •  LYRICA 100 MG capsule, Take 1 capsule by mouth 3 (Three) Times a Day., Disp: 90 capsule, Rfl: 2  •  magnesium oxide (MAG-OX) 400 MG tablet, Take 400 mg by mouth 2 (Two) Times a Day., Disp: , Rfl:   •  potassium chloride (K-DUR,KLOR-CON) 20 MEQ CR tablet, Take 2 tablets by mouth Every Morning., Disp: 30 tablet, Rfl: 0  •  sertraline (ZOLOFT) 50 MG tablet, TAKE 1 TABLET BY MOUTH EVERY EVENING BEFORE BED, Disp: 90 tablet, Rfl: 1  •  Syringe 23G X 1\" 3 ML misc, INJECT 1 ML B-12 ONCE MONTHLY, Disp: 1 each, Rfl: 3  •  temazepam (RESTORIL) 30 MG capsule, TAKE 1 CAPSULE BY MOUTH AT NIGHT AS NEEDED FOR SLEEP., Disp: 30 capsule, Rfl: 1  •  triamterene-hydrochlorothiazide (DYAZIDE) 37.5-25 MG per capsule, TAKE 1 CAPSULE BY MOUTH EVERY DAY, Disp: 90 capsule, Rfl: 1  •  warfarin (COUMADIN) 1 MG tablet, TAKE 1 TABLET BY MOUTH EVERY DAY AS DIRECTED, Disp: 45 tablet, Rfl: 2  •  warfarin (COUMADIN) 5 MG tablet, Active thrombosis  Indications: DVT/PE (active thrombosis), Disp: 30 tablet, Rfl: 0  •  acetaminophen (TYLENOL) 500 MG tablet, Take 1,000 mg by mouth Every 6 (Six) Hours As Needed for Mild Pain ., Disp: , Rfl:   •  albuterol sulfate  (90 Base) MCG/ACT inhaler, Inhale 2 puffs Every 6 (Six) Hours As Needed for Wheezing., Disp: 6.7 g, Rfl: 0  •  ondansetron ODT (ZOFRAN-ODT) 8 MG disintegrating tablet, Take 8 mg by mouth Every 8 (Eight) Hours As Needed for Nausea or Vomiting., Disp: , Rfl:   •  oxyCODONE (ROXICODONE) 10 MG tablet, Take 1 tablet by mouth 3 (Three) Times a Day As Needed for Moderate Pain ., Disp: 90 tablet, Rfl: 0    Allergies   Allergen Reactions   • Morphine Nausea Only and Hives   • Penicillin V Potassium Rash   • Sulfa Antibiotics " Rash   • Levaquin [Levofloxacin] Nausea Only and Dizziness     When taken with Coumadin   • Amoxicillin Rash   • Norco [Hydrocodone-Acetaminophen] Rash       Family History   Problem Relation Age of Onset   • Hypertension Mother    • Cancer Father    • Hypertension Father    • Hypertension Sister    • Hypertension Brother    • Stroke Brother        Cancer-related family history includes Cancer in her father.    Social History     Tobacco Use   • Smoking status: Never Smoker   • Smokeless tobacco: Never Used   Substance Use Topics   • Alcohol use: No     Frequency: Never     Comment: caffeine 32-64oz qd   • Drug use: No       I have reviewed the history of present illness, past medical history, family history, social history, lab results, all notes and other records since the patient was last seen .. Reviewed by me    SUBJECTIVE:  The patient is here for a follow up appointment.  He had a low-grade fever on 4/2/2020.  She had conveyed 19 testing done which was negative.  She feels well, she denies nausea vomiting fevers or chills.  Her appetite remains good.        REVIEW OF SYSTEMS:  Review of Systems   Constitutional: Negative for chills and fever.   HENT: Negative for ear pain, mouth sores, nosebleeds and sore throat.    Eyes: Negative for photophobia and visual disturbance.        Wears eye glasses   Respiratory: Negative for wheezing and stridor.    Cardiovascular: Negative for chest pain and palpitations.   Gastrointestinal: Negative for abdominal pain, diarrhea, nausea and vomiting.   Endocrine: Negative for cold intolerance and heat intolerance.   Genitourinary: Negative for dysuria and hematuria.   Musculoskeletal: Negative for joint swelling and neck stiffness.   Skin: Negative for color change and rash.   Neurological: Negative for seizures and syncope.   Hematological: Negative for adenopathy.        No obvious bleeding   Psychiatric/Behavioral: Negative for agitation, confusion and hallucinations.        OBJECTIVE:    There were no vitals filed for this visit.    ECOG  (1) Restricted in physically strenuous activity, ambulatory and able to do work of light nature    Physical Exam     This is a tele-visit    RECENT LABS  WBC   Date Value Ref Range Status   03/04/2020 4.97 3.40 - 10.80 10*3/mm3 Final     RBC   Date Value Ref Range Status   03/04/2020 3.01 (L) 3.77 - 5.28 10*6/mm3 Final     Hemoglobin   Date Value Ref Range Status   03/04/2020 10.3 (L) 12.0 - 15.9 g/dL Final     Hematocrit   Date Value Ref Range Status   03/04/2020 32.7 (L) 34.0 - 46.6 % Final     MCV   Date Value Ref Range Status   03/04/2020 108.6 (H) 79.0 - 97.0 fL Final     MCH   Date Value Ref Range Status   03/04/2020 34.2 (H) 26.6 - 33.0 pg Final     MCHC   Date Value Ref Range Status   03/04/2020 31.5 31.5 - 35.7 g/dL Final     RDW   Date Value Ref Range Status   03/04/2020 15.9 (H) 12.3 - 15.4 % Final     RDW-SD   Date Value Ref Range Status   03/04/2020 60.9 (H) 37.0 - 54.0 fl Final     MPV   Date Value Ref Range Status   03/04/2020 10.8 6.0 - 12.0 fL Final     Platelets   Date Value Ref Range Status   03/04/2020 94 (L) 140 - 450 10*3/mm3 Final     Neutrophil %   Date Value Ref Range Status   03/04/2020 67.2 42.7 - 76.0 % Final     Lymphocyte %   Date Value Ref Range Status   03/04/2020 21.5 19.6 - 45.3 % Final     Monocyte %   Date Value Ref Range Status   03/04/2020 9.3 5.0 - 12.0 % Final     Eosinophil %   Date Value Ref Range Status   03/04/2020 1.8 0.3 - 6.2 % Final     Basophil %   Date Value Ref Range Status   03/04/2020 0.2 0.0 - 1.5 % Final     Neutrophils, Absolute   Date Value Ref Range Status   03/04/2020 3.34 1.70 - 7.00 10*3/mm3 Final     Lymphocytes, Absolute   Date Value Ref Range Status   03/04/2020 1.07 0.70 - 3.10 10*3/mm3 Final     Monocytes, Absolute   Date Value Ref Range Status   03/04/2020 0.46 0.10 - 0.90 10*3/mm3 Final     Eosinophils, Absolute   Date Value Ref Range Status   03/04/2020 0.09 0.00 - 0.40  10*3/mm3 Final     Basophils, Absolute   Date Value Ref Range Status   03/04/2020 0.01 0.00 - 0.20 10*3/mm3 Final     nRBC   Date Value Ref Range Status   12/11/2019 0.3 (H) 0.0 - 0.2 /100 WBC Final       Lab Results   Component Value Date    GLUCOSE 79 03/04/2020    BUN 13 03/04/2020    CREATININE 0.83 03/04/2020    EGFRIFNONA 69 03/04/2020    EGFRIFAFRI >60 06/07/2019    BCR 15.7 03/04/2020    K 4.3 03/04/2020    CO2 28.0 03/04/2020    CALCIUM 9.7 03/04/2020    ALBUMIN 4.20 03/04/2020    LABIL2 1.5 06/07/2019    AST 20 03/04/2020    ALT 10 03/04/2020         Assessment/Plan     There are no diagnoses linked to this encounter.    ASSESSMENT:    1. Recurrent ovarian cancer on carboplatinum, Doxil.  Patient had had carboplatinum Taxol, carbo Taxotere, Gemzar single agent, Niraparib and was on Doxil and carboplatinum.  Doxil has been discontinued due to approaching of lifetime dosing.  Refer to paradigm testing for future options at time of progression  2. Iron deficiency anemia: Intermittently on  iron  3. Pancytopenia: Due to chemotherapy: On Procrit  4. Hypomagnesemia: Continue to monitor levels  5. B12 deficiency on parenteral B12 replacement  6. Mucositis due to chemotherapy: resolved  7. Monitoring for chemotherapy toxicities    I reviewed her most recent CT scans done on 3/9/2020.  She has slightly enlarged mediastinal lymph nodes and left hilar node which may be reactive.  Patient also had fever for that reason she had conveyed 19 testing done which was negative.  Right now she feels well and her CTs do not show any other signs of progressive disease in the abdomen and pelvis.  She has some calcified nodules which are not enlarging in the abdomen and pelvis.  So for now continue single agent carboplatinum.  We will plan to rescan  again in 3 months.    PLANS:     Continue chemotherapy only with carboplatinum.  Her disease is stable   monhly: Ongoing  Check restaging CT scans of the chest, abdomen and  pelvis due 6/3/2020   Continue magnesium oxide 400 mg three times a day and weekly IV replacement as needed.   Continue Coumadin  Continue b12 injections IM monthly. Transitioned injections to be done at the cancer center  Continue Retacrit 40,000 units subcu weekly for hemoglobin less than 10, For chemotherapy-induced anemia  Discontinued Doxil due to approaching lifetime dosing.  CBC prior to cataract surgery  Call for problems in the interim  Magic mouthwash PRN for mucositis       I have reviewed labs results, imaging, vitals, and medications with the patient today. Will follow up in 1 month with me.    Patient verbalized understanding and is in agreement of the above plan.

## 2020-04-07 NOTE — TELEPHONE ENCOUNTER
I received a call from the patient's daughter today who stated that she was having issues with getting the patient's [employer] am[employer] to work.  She stated that the tablet she was using did not support the amelia.  She requested a telephone visit.

## 2020-04-08 ENCOUNTER — APPOINTMENT (OUTPATIENT)
Dept: PAIN MEDICINE | Facility: CLINIC | Age: 65
End: 2020-04-08

## 2020-04-09 ENCOUNTER — TELEPHONE (OUTPATIENT)
Dept: ONCOLOGY | Facility: CLINIC | Age: 65
End: 2020-04-09

## 2020-04-09 ENCOUNTER — OFFICE VISIT (OUTPATIENT)
Dept: PAIN MEDICINE | Facility: CLINIC | Age: 65
End: 2020-04-09

## 2020-04-09 VITALS
BODY MASS INDEX: 30.82 KG/M2 | OXYGEN SATURATION: 99 % | HEIGHT: 65 IN | HEART RATE: 53 BPM | TEMPERATURE: 98 F | DIASTOLIC BLOOD PRESSURE: 71 MMHG | WEIGHT: 185 LBS | SYSTOLIC BLOOD PRESSURE: 124 MMHG | RESPIRATION RATE: 16 BRPM

## 2020-04-09 DIAGNOSIS — M79.604 LEG PAIN, BILATERAL: Primary | ICD-10-CM

## 2020-04-09 DIAGNOSIS — M79.7 FIBROMYOSITIS: ICD-10-CM

## 2020-04-09 DIAGNOSIS — M79.605 LEG PAIN, BILATERAL: Primary | ICD-10-CM

## 2020-04-09 DIAGNOSIS — M50.10 CERVICAL DISC DISORDER WITH RADICULOPATHY: ICD-10-CM

## 2020-04-09 DIAGNOSIS — Z79.899 OTHER LONG TERM (CURRENT) DRUG THERAPY: ICD-10-CM

## 2020-04-09 DIAGNOSIS — M47.812 OSTEOARTHRITIS OF CERVICAL SPINE WITHOUT MYELOPATHY: ICD-10-CM

## 2020-04-09 DIAGNOSIS — R52 PAIN: ICD-10-CM

## 2020-04-09 DIAGNOSIS — G25.81 RESTLESS LEG SYNDROME: ICD-10-CM

## 2020-04-09 PROCEDURE — 99213 OFFICE O/P EST LOW 20 MIN: CPT | Performed by: PHYSICAL MEDICINE & REHABILITATION

## 2020-04-09 PROCEDURE — G0463 HOSPITAL OUTPT CLINIC VISIT: HCPCS | Performed by: PHYSICAL MEDICINE & REHABILITATION

## 2020-04-09 RX ORDER — OXYCODONE HYDROCHLORIDE 10 MG/1
10 TABLET ORAL 3 TIMES DAILY PRN
Qty: 90 TABLET | Refills: 0 | Status: SHIPPED | OUTPATIENT
Start: 2020-04-09 | End: 2020-04-09 | Stop reason: SDUPTHER

## 2020-04-09 RX ORDER — OXYCODONE HYDROCHLORIDE 10 MG/1
10 TABLET ORAL 3 TIMES DAILY PRN
Qty: 90 TABLET | Refills: 0 | Status: SHIPPED | OUTPATIENT
Start: 2020-04-09 | End: 2020-07-09 | Stop reason: SDUPTHER

## 2020-04-09 NOTE — PROGRESS NOTES
"Subjective   Tahira Zimmerman is a 64 y.o. female.     neck and shoulder pain, all over aching \"bone\" due to chemo--also left rotator cuff tear also broke out with shingles-under breast on back and on head. 9/10 at worst, 2/10 at best. Nonradiating. Always present, varies, interferes with daily activities. Imaging shows metastatic disease. Lengthy prior notes reviwed, treated for metastatic disease, failed Tramadol, cannot tolerate Hydrocodone, could tolerate Oxycodone 5mg, taking BID, also Lyrica 75mg BID. Started new chemo, worsening pain in b/l hips and legs, especially at night. Now taking Oxycodone 10mg TID prn and Lyrica 100mg BID with good relief. Started another new chemo with worsening pain, on a break while insurance approves pill form with improved pain. New chemo pill lowered Mg, holding it while getting Mg infusions. Shingles outbreak. Started new chemo, has aches controlled by current meds.       The following portions of the patient's history were reviewed and updated as appropriate: allergies, current medications, past family history, past medical history, past social history, past surgical history and problem list.    Review of Systems   Constitutional: Negative for chills, fatigue and fever.   HENT: Positive for hearing loss. Negative for trouble swallowing.    Eyes: Negative for visual disturbance.   Respiratory: Negative for shortness of breath.    Cardiovascular: Negative for chest pain.   Gastrointestinal: Positive for abdominal pain and nausea. Negative for constipation, diarrhea and vomiting.   Genitourinary: Negative for urinary incontinence.   Musculoskeletal: Positive for arthralgias, back pain and neck pain. Negative for joint swelling and myalgias.   Neurological: Positive for headache. Negative for dizziness, weakness and numbness.       Objective   Physical Exam   Constitutional: She is oriented to person, place, and time. She appears well-developed and well-nourished.   HENT: "   Head: Normocephalic and atraumatic.   Eyes: Pupils are equal, round, and reactive to light. EOM are normal.   Neck: Normal range of motion.   Cardiovascular: Normal rate, regular rhythm, normal heart sounds and intact distal pulses.   Pulmonary/Chest: Breath sounds normal.   Abdominal: Soft. Bowel sounds are normal. She exhibits no distension. There is no tenderness.   Neurological: She is alert and oriented to person, place, and time. She has normal strength and normal reflexes. She displays normal reflexes. A sensory deficit is present.   Decreased in b/l stocking distribution   Psychiatric: She has a normal mood and affect. Her behavior is normal. Thought content normal.         Assessment/Plan   Tahira was seen today for leg pain and shoulder pain.    Diagnoses and all orders for this visit:    Leg pain, bilateral    Pain    Cervical disc disorder with radiculopathy    Fibromyositis    Osteoarthritis of cervical spine without myelopathy    Other long term (current) drug therapy    Restless leg syndrome        INspect reviewed, in order. Low risk. Repeat drug screen 1/6/20 in order.  Cont Oxycodone 10mg 1/2 - 1 tab TID prn, cannot tolerate Hydrocodone, failed Tramadol. May need to increase in future, possibly before next visit with starting IV chemo.  Increased to Lyrica 100mg TID for worsening pain. More effective than Gabapentin, already taking antidepressants so no plans for beginning Cymbalta.  Cont RxAlt shingles cream prn.  Cont other meds as prescribed.  No plans for procedures or TENS with metastatic disease.  RTC 3 months for f/u.

## 2020-04-10 ENCOUNTER — TELEPHONE (OUTPATIENT)
Dept: PAIN MEDICINE | Facility: CLINIC | Age: 65
End: 2020-04-10

## 2020-04-10 RX ORDER — PREGABALIN 100 MG/1
100 CAPSULE ORAL 3 TIMES DAILY
Qty: 90 CAPSULE | Refills: 2 | Status: SHIPPED | OUTPATIENT
Start: 2020-04-10 | End: 2020-05-29 | Stop reason: SDUPTHER

## 2020-04-10 NOTE — TELEPHONE ENCOUNTER
I got a request from the pharmacy about pt's lyrica. It says her insurance requires a generic substitution.  Is there a reason that the pt needs brand name?? If not can you send in an rx for the pregabalin 100mg??

## 2020-04-15 ENCOUNTER — TELEPHONE (OUTPATIENT)
Dept: ONCOLOGY | Facility: CLINIC | Age: 65
End: 2020-04-15

## 2020-04-15 ENCOUNTER — LAB (OUTPATIENT)
Dept: LAB | Facility: HOSPITAL | Age: 65
End: 2020-04-15

## 2020-04-15 ENCOUNTER — HOSPITAL ENCOUNTER (OUTPATIENT)
Dept: ONCOLOGY | Facility: HOSPITAL | Age: 65
Discharge: HOME | End: 2020-04-15
Admitting: NURSE PRACTITIONER

## 2020-04-15 VITALS
DIASTOLIC BLOOD PRESSURE: 74 MMHG | HEIGHT: 65 IN | HEART RATE: 53 BPM | BODY MASS INDEX: 30.16 KG/M2 | TEMPERATURE: 98 F | SYSTOLIC BLOOD PRESSURE: 137 MMHG | RESPIRATION RATE: 18 BRPM | WEIGHT: 181 LBS

## 2020-04-15 DIAGNOSIS — D64.81 ANTINEOPLASTIC CHEMOTHERAPY INDUCED ANEMIA: ICD-10-CM

## 2020-04-15 DIAGNOSIS — T45.1X5A PANCYTOPENIA DUE TO ANTINEOPLASTIC CHEMOTHERAPY (HCC): ICD-10-CM

## 2020-04-15 DIAGNOSIS — E83.42 HYPOMAGNESEMIA: Primary | ICD-10-CM

## 2020-04-15 DIAGNOSIS — D61.810 PANCYTOPENIA DUE TO ANTINEOPLASTIC CHEMOTHERAPY (HCC): ICD-10-CM

## 2020-04-15 DIAGNOSIS — I82.722 CHRONIC DEEP VEIN THROMBOSIS (DVT) OF LEFT UPPER EXTREMITY, UNSPECIFIED VEIN (HCC): ICD-10-CM

## 2020-04-15 DIAGNOSIS — E83.42 HYPOMAGNESEMIA: ICD-10-CM

## 2020-04-15 DIAGNOSIS — Z79.01 LONG TERM (CURRENT) USE OF ANTICOAGULANTS: ICD-10-CM

## 2020-04-15 DIAGNOSIS — T45.1X5A ANTINEOPLASTIC CHEMOTHERAPY INDUCED ANEMIA: ICD-10-CM

## 2020-04-15 LAB
ANION GAP SERPL CALCULATED.3IONS-SCNC: 11 MMOL/L (ref 5–15)
BASOPHILS # BLD AUTO: 0.02 10*3/MM3 (ref 0–0.2)
BASOPHILS NFR BLD AUTO: 0.5 % (ref 0–1.5)
BUN BLD-MCNC: 16 MG/DL (ref 8–23)
BUN/CREAT SERPL: 17.4 (ref 7–25)
CALCIUM SPEC-SCNC: 9.5 MG/DL (ref 8.6–10.5)
CHLORIDE SERPL-SCNC: 104 MMOL/L (ref 98–107)
CO2 SERPL-SCNC: 28 MMOL/L (ref 22–29)
CREAT BLD-MCNC: 0.92 MG/DL (ref 0.57–1)
DEPRECATED RDW RBC AUTO: 56.6 FL (ref 37–54)
EOSINOPHIL # BLD AUTO: 0.16 10*3/MM3 (ref 0–0.4)
EOSINOPHIL NFR BLD AUTO: 4 % (ref 0.3–6.2)
ERYTHROCYTE [DISTWIDTH] IN BLOOD BY AUTOMATED COUNT: 15.5 % (ref 12.3–15.4)
GFR SERPL CREATININE-BSD FRML MDRD: 61 ML/MIN/1.73
GLUCOSE BLD-MCNC: 86 MG/DL (ref 65–99)
HCT VFR BLD AUTO: 33.2 % (ref 34–46.6)
HGB BLD-MCNC: 10.6 G/DL (ref 12–15.9)
INR PPP: 3.4
LYMPHOCYTES # BLD AUTO: 1.47 10*3/MM3 (ref 0.7–3.1)
LYMPHOCYTES NFR BLD AUTO: 36.8 % (ref 19.6–45.3)
MAGNESIUM SERPL-MCNC: 1.5 MG/DL (ref 1.6–2.4)
MCH RBC QN AUTO: 32.6 PG (ref 26.6–33)
MCHC RBC AUTO-ENTMCNC: 31.9 G/DL (ref 31.5–35.7)
MCV RBC AUTO: 102.2 FL (ref 79–97)
MONOCYTES # BLD AUTO: 0.39 10*3/MM3 (ref 0.1–0.9)
MONOCYTES NFR BLD AUTO: 9.8 % (ref 5–12)
NEUTROPHILS # BLD AUTO: 1.96 10*3/MM3 (ref 1.7–7)
NEUTROPHILS NFR BLD AUTO: 48.9 % (ref 42.7–76)
PLATELET # BLD AUTO: 160 10*3/MM3 (ref 140–450)
PMV BLD AUTO: 9.8 FL (ref 6–12)
POTASSIUM BLD-SCNC: 4.4 MMOL/L (ref 3.5–5.2)
RBC # BLD AUTO: 3.25 10*6/MM3 (ref 3.77–5.28)
SODIUM BLD-SCNC: 143 MMOL/L (ref 136–145)
WBC NRBC COR # BLD: 4 10*3/MM3 (ref 3.4–10.8)

## 2020-04-15 PROCEDURE — 36416 COLLJ CAPILLARY BLOOD SPEC: CPT

## 2020-04-15 PROCEDURE — 85025 COMPLETE CBC W/AUTO DIFF WBC: CPT

## 2020-04-15 PROCEDURE — 36415 COLL VENOUS BLD VENIPUNCTURE: CPT

## 2020-04-15 PROCEDURE — 85610 PROTHROMBIN TIME: CPT

## 2020-04-15 PROCEDURE — 80048 BASIC METABOLIC PNL TOTAL CA: CPT | Performed by: INTERNAL MEDICINE

## 2020-04-15 PROCEDURE — 83735 ASSAY OF MAGNESIUM: CPT | Performed by: INTERNAL MEDICINE

## 2020-04-15 NOTE — ADDENDUM NOTE
Encounter addended by: Kathie Gudino RN on: 4/15/2020 8:35 AM   Actions taken: Visit diagnoses modified, Order list changed, Diagnosis association updated

## 2020-04-15 NOTE — TELEPHONE ENCOUNTER
Tried to call patient per the NP to let her know they are ordering magnesium infusions weekly for her. No answer, left vm to call back.

## 2020-04-15 NOTE — TELEPHONE ENCOUNTER
I called the patient to verify she was taking her magnesium as prescribed per Bindu Staely. The patient stated she is taking it twice a day as ordered. Message sent to [place] to see if patient should adjust dosing.

## 2020-04-16 ENCOUNTER — TELEPHONE (OUTPATIENT)
Dept: ONCOLOGY | Facility: CLINIC | Age: 65
End: 2020-04-16

## 2020-04-16 NOTE — TELEPHONE ENCOUNTER
Called patient to inform her of the orders for IV mag. I told her someone would call to get those scheduled as soon as it gets approved. Patient v/u.

## 2020-04-16 NOTE — TELEPHONE ENCOUNTER
----- Message from Siobhan Staley RN sent at 4/15/2020  3:02 PM EDT -----  You can let her know we are putting in an order in for her to come in for weekly IV Magnesium. Her magnesium level is low because of her carboplatin. We don't want to give her too much oral magnesium because it can cause diarrhea.   ----- Message -----  From: Marybeth Perez MA  Sent: 4/15/2020   2:00 PM EDT  To: Siobhan Staley RN    She is currently taking it as prescribed. Does she need to adjust it?   ----- Message -----  From: Siobhan Staley RN  Sent: 4/15/2020  12:33 PM EDT  To: Marybeth Perez MA    Please call patient and ask if she is taking her Magnesium oxide 400 mg PO tablets twice a day. If she is not taking, please advise patient her magnesium is low and she should take her magnesium oxide as prescribed. If not, we will have to change her medication.     Electronically signed by Siobhan Staley RN, 04/15/20, 12:33 PM.

## 2020-04-17 ENCOUNTER — HOSPITAL ENCOUNTER (OUTPATIENT)
Dept: ONCOLOGY | Facility: HOSPITAL | Age: 65
Setting detail: INFUSION SERIES
Discharge: HOME | End: 2020-04-17

## 2020-04-17 VITALS
HEART RATE: 57 BPM | BODY MASS INDEX: 30.66 KG/M2 | WEIGHT: 184 LBS | DIASTOLIC BLOOD PRESSURE: 69 MMHG | SYSTOLIC BLOOD PRESSURE: 103 MMHG | TEMPERATURE: 98.2 F

## 2020-04-17 DIAGNOSIS — Z51.11 ENCOUNTER FOR ANTINEOPLASTIC CHEMOTHERAPY: ICD-10-CM

## 2020-04-17 DIAGNOSIS — E83.42 HYPOMAGNESEMIA: ICD-10-CM

## 2020-04-17 DIAGNOSIS — C56.1 MALIGNANT NEOPLASM OF RIGHT OVARY (HCC): Primary | ICD-10-CM

## 2020-04-17 LAB
ALBUMIN SERPL-MCNC: 3.7 G/DL (ref 3.5–5.2)
ALBUMIN/GLOB SERPL: 1.4 G/DL
ALP SERPL-CCNC: 115 U/L (ref 39–117)
ALT SERPL W P-5'-P-CCNC: 12 U/L (ref 1–33)
ANION GAP SERPL CALCULATED.3IONS-SCNC: 10 MMOL/L (ref 5–15)
AST SERPL-CCNC: 23 U/L (ref 1–32)
BASOPHILS # BLD AUTO: 0.02 10*3/MM3 (ref 0–0.2)
BASOPHILS NFR BLD AUTO: 0.4 % (ref 0–1.5)
BILIRUB SERPL-MCNC: 0.2 MG/DL (ref 0.2–1.2)
BUN BLD-MCNC: 15 MG/DL (ref 8–23)
BUN/CREAT SERPL: 15.3 (ref 7–25)
CALCIUM SPEC-SCNC: 9.3 MG/DL (ref 8.6–10.5)
CHLORIDE SERPL-SCNC: 107 MMOL/L (ref 98–107)
CO2 SERPL-SCNC: 25 MMOL/L (ref 22–29)
CREAT BLD-MCNC: 0.98 MG/DL (ref 0.57–1)
CREAT BLDA-MCNC: 1.1 MG/DL (ref 0.6–1.3)
DEPRECATED RDW RBC AUTO: 57.4 FL (ref 37–54)
EOSINOPHIL # BLD AUTO: 0.18 10*3/MM3 (ref 0–0.4)
EOSINOPHIL NFR BLD AUTO: 3.9 % (ref 0.3–6.2)
ERYTHROCYTE [DISTWIDTH] IN BLOOD BY AUTOMATED COUNT: 15.6 % (ref 12.3–15.4)
GFR SERPL CREATININE-BSD FRML MDRD: 57 ML/MIN/1.73
GLOBULIN UR ELPH-MCNC: 2.7 GM/DL
GLUCOSE BLD-MCNC: 82 MG/DL (ref 65–99)
HCT VFR BLD AUTO: 33.2 % (ref 34–46.6)
HGB BLD-MCNC: 10.5 G/DL (ref 12–15.9)
LYMPHOCYTES # BLD AUTO: 1.2 10*3/MM3 (ref 0.7–3.1)
LYMPHOCYTES NFR BLD AUTO: 25.7 % (ref 19.6–45.3)
MAGNESIUM SERPL-MCNC: 1.4 MG/DL (ref 1.6–2.4)
MCH RBC QN AUTO: 32.6 PG (ref 26.6–33)
MCHC RBC AUTO-ENTMCNC: 31.6 G/DL (ref 31.5–35.7)
MCV RBC AUTO: 103.1 FL (ref 79–97)
MONOCYTES # BLD AUTO: 0.42 10*3/MM3 (ref 0.1–0.9)
MONOCYTES NFR BLD AUTO: 9 % (ref 5–12)
NEUTROPHILS # BLD AUTO: 2.85 10*3/MM3 (ref 1.7–7)
NEUTROPHILS NFR BLD AUTO: 61 % (ref 42.7–76)
PLATELET # BLD AUTO: 147 10*3/MM3 (ref 140–450)
PMV BLD AUTO: 10.4 FL (ref 6–12)
POTASSIUM BLD-SCNC: 4.6 MMOL/L (ref 3.5–5.2)
PROT SERPL-MCNC: 6.4 G/DL (ref 6–8.5)
RBC # BLD AUTO: 3.22 10*6/MM3 (ref 3.77–5.28)
SODIUM BLD-SCNC: 142 MMOL/L (ref 136–145)
WBC NRBC COR # BLD: 4.67 10*3/MM3 (ref 3.4–10.8)

## 2020-04-17 PROCEDURE — 25010000002 FOSAPREPITANT PER 1 MG: Performed by: INTERNAL MEDICINE

## 2020-04-17 PROCEDURE — 25010000002 MAGNESIUM SULFATE 2 GM/50ML SOLUTION: Performed by: INTERNAL MEDICINE

## 2020-04-17 PROCEDURE — 25010000002 PALONOSETRON 0.25 MG/5ML SOLUTION PREFILLED SYRINGE: Performed by: INTERNAL MEDICINE

## 2020-04-17 PROCEDURE — 36591 DRAW BLOOD OFF VENOUS DEVICE: CPT

## 2020-04-17 PROCEDURE — 85025 COMPLETE CBC W/AUTO DIFF WBC: CPT | Performed by: NURSE PRACTITIONER

## 2020-04-17 PROCEDURE — 96367 TX/PROPH/DG ADDL SEQ IV INF: CPT

## 2020-04-17 PROCEDURE — 25010000002 DEXAMETHASONE SODIUM PHOSPHATE 120 MG/30ML SOLUTION: Performed by: INTERNAL MEDICINE

## 2020-04-17 PROCEDURE — 82565 ASSAY OF CREATININE: CPT

## 2020-04-17 PROCEDURE — 80053 COMPREHEN METABOLIC PANEL: CPT | Performed by: NURSE PRACTITIONER

## 2020-04-17 PROCEDURE — 83735 ASSAY OF MAGNESIUM: CPT | Performed by: NURSE PRACTITIONER

## 2020-04-17 PROCEDURE — 25010000003 HEPARIN LOCK FLUCH PER 10 UNITS: Performed by: INTERNAL MEDICINE

## 2020-04-17 PROCEDURE — 25010000002 CARBOPLATIN PER 50 MG: Performed by: INTERNAL MEDICINE

## 2020-04-17 PROCEDURE — 96375 TX/PRO/DX INJ NEW DRUG ADDON: CPT

## 2020-04-17 PROCEDURE — 96413 CHEMO IV INFUSION 1 HR: CPT

## 2020-04-17 RX ORDER — FAMOTIDINE 10 MG/ML
20 INJECTION, SOLUTION INTRAVENOUS AS NEEDED
Status: CANCELLED | OUTPATIENT
Start: 2020-04-17

## 2020-04-17 RX ORDER — MAGNESIUM SULFATE HEPTAHYDRATE 40 MG/ML
2 INJECTION, SOLUTION INTRAVENOUS ONCE
Status: CANCELLED | OUTPATIENT
Start: 2020-04-17

## 2020-04-17 RX ORDER — HEPARIN SODIUM (PORCINE) LOCK FLUSH IV SOLN 100 UNIT/ML 100 UNIT/ML
500 SOLUTION INTRAVENOUS AS NEEDED
Status: DISCONTINUED | OUTPATIENT
Start: 2020-04-17 | End: 2020-04-18 | Stop reason: HOSPADM

## 2020-04-17 RX ORDER — DEXTROSE MONOHYDRATE 50 MG/ML
250 INJECTION, SOLUTION INTRAVENOUS ONCE
Status: CANCELLED | OUTPATIENT
Start: 2020-04-17

## 2020-04-17 RX ORDER — SODIUM CHLORIDE 9 MG/ML
250 INJECTION, SOLUTION INTRAVENOUS ONCE
Status: CANCELLED | OUTPATIENT
Start: 2020-04-17

## 2020-04-17 RX ORDER — DIPHENHYDRAMINE HYDROCHLORIDE 50 MG/ML
50 INJECTION INTRAMUSCULAR; INTRAVENOUS AS NEEDED
Status: CANCELLED | OUTPATIENT
Start: 2020-04-17

## 2020-04-17 RX ORDER — SODIUM CHLORIDE 0.9 % (FLUSH) 0.9 %
10 SYRINGE (ML) INJECTION AS NEEDED
Status: DISCONTINUED | OUTPATIENT
Start: 2020-04-17 | End: 2020-04-18 | Stop reason: HOSPADM

## 2020-04-17 RX ORDER — PALONOSETRON 0.05 MG/ML
0.25 INJECTION, SOLUTION INTRAVENOUS ONCE
Status: CANCELLED | OUTPATIENT
Start: 2020-04-17

## 2020-04-17 RX ORDER — SODIUM CHLORIDE 0.9 % (FLUSH) 0.9 %
10 SYRINGE (ML) INJECTION AS NEEDED
Status: CANCELLED | OUTPATIENT
Start: 2020-04-17

## 2020-04-17 RX ORDER — PALONOSETRON 0.05 MG/ML
0.25 INJECTION, SOLUTION INTRAVENOUS ONCE
Status: COMPLETED | OUTPATIENT
Start: 2020-04-17 | End: 2020-04-17

## 2020-04-17 RX ORDER — DEXTROSE MONOHYDRATE 50 MG/ML
250 INJECTION, SOLUTION INTRAVENOUS ONCE
Status: DISCONTINUED | OUTPATIENT
Start: 2020-04-17 | End: 2020-04-18 | Stop reason: HOSPADM

## 2020-04-17 RX ORDER — MAGNESIUM SULFATE HEPTAHYDRATE 40 MG/ML
2 INJECTION, SOLUTION INTRAVENOUS ONCE
Status: COMPLETED | OUTPATIENT
Start: 2020-04-17 | End: 2020-04-17

## 2020-04-17 RX ORDER — SODIUM CHLORIDE 9 MG/ML
250 INJECTION, SOLUTION INTRAVENOUS ONCE
Status: COMPLETED | OUTPATIENT
Start: 2020-04-17 | End: 2020-04-17

## 2020-04-17 RX ORDER — HEPARIN SODIUM (PORCINE) LOCK FLUSH IV SOLN 100 UNIT/ML 100 UNIT/ML
500 SOLUTION INTRAVENOUS AS NEEDED
Status: CANCELLED | OUTPATIENT
Start: 2020-04-17

## 2020-04-17 RX ADMIN — DEXAMETHASONE SODIUM PHOSPHATE 8 MG: 4 INJECTION, SOLUTION INTRA-ARTICULAR; INTRALESIONAL; INTRAMUSCULAR; INTRAVENOUS; SOFT TISSUE at 13:13

## 2020-04-17 RX ADMIN — HEPARIN 500 UNITS: 100 SYRINGE at 15:15

## 2020-04-17 RX ADMIN — SODIUM CHLORIDE 100 ML: 900 INJECTION, SOLUTION INTRAVENOUS at 12:37

## 2020-04-17 RX ADMIN — SODIUM CHLORIDE 250 ML: 900 INJECTION, SOLUTION INTRAVENOUS at 12:35

## 2020-04-17 RX ADMIN — Medication 10 ML: at 15:15

## 2020-04-17 RX ADMIN — PALONOSETRON 0.25 MG: 0.25 INJECTION, SOLUTION INTRAVENOUS at 12:36

## 2020-04-17 RX ADMIN — MAGNESIUM SULFATE HEPTAHYDRATE 2 G: 40 INJECTION, SOLUTION INTRAVENOUS at 14:17

## 2020-04-17 RX ADMIN — CARBOPLATIN 470 MG: 10 INJECTION, SOLUTION INTRAVENOUS at 13:37

## 2020-04-17 NOTE — PROGRESS NOTES
Do not give Neulasta on 4/21/20 per VO of Dr. Ball. [facility]

## 2020-04-22 ENCOUNTER — APPOINTMENT (OUTPATIENT)
Dept: ONCOLOGY | Facility: HOSPITAL | Age: 65
End: 2020-04-22

## 2020-04-22 ENCOUNTER — APPOINTMENT (OUTPATIENT)
Dept: LAB | Facility: HOSPITAL | Age: 65
End: 2020-04-22

## 2020-04-24 ENCOUNTER — LAB (OUTPATIENT)
Dept: LAB | Facility: HOSPITAL | Age: 65
End: 2020-04-24

## 2020-04-24 ENCOUNTER — HOSPITAL ENCOUNTER (OUTPATIENT)
Dept: ONCOLOGY | Facility: HOSPITAL | Age: 65
Setting detail: INFUSION SERIES
Discharge: HOME | End: 2020-04-24

## 2020-04-24 ENCOUNTER — HOSPITAL ENCOUNTER (OUTPATIENT)
Dept: ONCOLOGY | Facility: HOSPITAL | Age: 65
Discharge: HOME | End: 2020-04-24
Admitting: NURSE PRACTITIONER

## 2020-04-24 VITALS
TEMPERATURE: 97.3 F | HEIGHT: 65 IN | SYSTOLIC BLOOD PRESSURE: 156 MMHG | BODY MASS INDEX: 29.82 KG/M2 | DIASTOLIC BLOOD PRESSURE: 82 MMHG | RESPIRATION RATE: 20 BRPM | WEIGHT: 179 LBS | HEART RATE: 51 BPM

## 2020-04-24 DIAGNOSIS — I82.722 CHRONIC DEEP VEIN THROMBOSIS (DVT) OF LEFT UPPER EXTREMITY, UNSPECIFIED VEIN (HCC): Primary | ICD-10-CM

## 2020-04-24 DIAGNOSIS — C56.1 MALIGNANT NEOPLASM OF RIGHT OVARY (HCC): ICD-10-CM

## 2020-04-24 DIAGNOSIS — E83.42 HYPOMAGNESEMIA: Primary | ICD-10-CM

## 2020-04-24 DIAGNOSIS — Z79.01 LONG TERM (CURRENT) USE OF ANTICOAGULANTS: Primary | ICD-10-CM

## 2020-04-24 LAB
INR PPP: 4
MAGNESIUM SERPL-MCNC: 1.8 MG/DL (ref 1.6–2.4)

## 2020-04-24 PROCEDURE — 83735 ASSAY OF MAGNESIUM: CPT | Performed by: NURSE PRACTITIONER

## 2020-04-24 PROCEDURE — 25010000002 MAGNESIUM SULFATE 2 GM/50ML SOLUTION: Performed by: NURSE PRACTITIONER

## 2020-04-24 PROCEDURE — 36591 DRAW BLOOD OFF VENOUS DEVICE: CPT

## 2020-04-24 PROCEDURE — 85610 PROTHROMBIN TIME: CPT

## 2020-04-24 PROCEDURE — 96365 THER/PROPH/DIAG IV INF INIT: CPT

## 2020-04-24 PROCEDURE — 25010000003 HEPARIN LOCK FLUCH PER 10 UNITS: Performed by: INTERNAL MEDICINE

## 2020-04-24 PROCEDURE — 36416 COLLJ CAPILLARY BLOOD SPEC: CPT

## 2020-04-24 RX ORDER — HEPARIN SODIUM (PORCINE) LOCK FLUSH IV SOLN 100 UNIT/ML 100 UNIT/ML
500 SOLUTION INTRAVENOUS AS NEEDED
Status: DISCONTINUED | OUTPATIENT
Start: 2020-04-24 | End: 2020-04-25 | Stop reason: HOSPADM

## 2020-04-24 RX ORDER — SODIUM CHLORIDE 0.9 % (FLUSH) 0.9 %
10 SYRINGE (ML) INJECTION AS NEEDED
Status: CANCELLED | OUTPATIENT
Start: 2020-04-24

## 2020-04-24 RX ORDER — HEPARIN SODIUM (PORCINE) LOCK FLUSH IV SOLN 100 UNIT/ML 100 UNIT/ML
500 SOLUTION INTRAVENOUS AS NEEDED
Status: CANCELLED | OUTPATIENT
Start: 2020-04-24

## 2020-04-24 RX ORDER — MAGNESIUM SULFATE HEPTAHYDRATE 40 MG/ML
2 INJECTION, SOLUTION INTRAVENOUS ONCE
Status: COMPLETED | OUTPATIENT
Start: 2020-04-24 | End: 2020-04-24

## 2020-04-24 RX ORDER — SODIUM CHLORIDE 0.9 % (FLUSH) 0.9 %
10 SYRINGE (ML) INJECTION AS NEEDED
Status: DISCONTINUED | OUTPATIENT
Start: 2020-04-24 | End: 2020-04-25 | Stop reason: HOSPADM

## 2020-04-24 RX ORDER — SODIUM CHLORIDE 9 MG/ML
250 INJECTION, SOLUTION INTRAVENOUS ONCE
Status: DISCONTINUED | OUTPATIENT
Start: 2020-04-24 | End: 2020-04-25 | Stop reason: HOSPADM

## 2020-04-24 RX ADMIN — Medication 10 ML: at 09:44

## 2020-04-24 RX ADMIN — HEPARIN 500 UNITS: 100 SYRINGE at 09:44

## 2020-04-24 RX ADMIN — MAGNESIUM SULFATE HEPTAHYDRATE 2 G: 40 INJECTION, SOLUTION INTRAVENOUS at 08:56

## 2020-04-25 DIAGNOSIS — G25.81 RESTLESS LEG SYNDROME: ICD-10-CM

## 2020-04-27 ENCOUNTER — HOSPITAL ENCOUNTER (OUTPATIENT)
Dept: ONCOLOGY | Facility: HOSPITAL | Age: 65
Discharge: HOME | End: 2020-04-27
Admitting: NURSE PRACTITIONER

## 2020-04-27 ENCOUNTER — LAB (OUTPATIENT)
Dept: LAB | Facility: HOSPITAL | Age: 65
End: 2020-04-27

## 2020-04-27 VITALS
DIASTOLIC BLOOD PRESSURE: 84 MMHG | BODY MASS INDEX: 30.32 KG/M2 | HEART RATE: 56 BPM | WEIGHT: 182 LBS | HEIGHT: 65 IN | SYSTOLIC BLOOD PRESSURE: 156 MMHG | TEMPERATURE: 97.8 F | RESPIRATION RATE: 18 BRPM

## 2020-04-27 DIAGNOSIS — I82.722 CHRONIC DEEP VEIN THROMBOSIS (DVT) OF LEFT UPPER EXTREMITY, UNSPECIFIED VEIN (HCC): ICD-10-CM

## 2020-04-27 DIAGNOSIS — Z79.01 LONG TERM (CURRENT) USE OF ANTICOAGULANTS: Primary | ICD-10-CM

## 2020-04-27 LAB — INR PPP: 1.6

## 2020-04-27 PROCEDURE — 36416 COLLJ CAPILLARY BLOOD SPEC: CPT

## 2020-04-27 PROCEDURE — 85610 PROTHROMBIN TIME: CPT

## 2020-04-27 RX ORDER — TEMAZEPAM 30 MG/1
CAPSULE ORAL
Qty: 30 CAPSULE | Refills: 1 | Status: SHIPPED | OUTPATIENT
Start: 2020-04-27 | End: 2020-06-23

## 2020-05-01 ENCOUNTER — HOSPITAL ENCOUNTER (OUTPATIENT)
Dept: ONCOLOGY | Facility: HOSPITAL | Age: 65
Discharge: HOME | End: 2020-05-01
Admitting: NURSE PRACTITIONER

## 2020-05-01 ENCOUNTER — LAB (OUTPATIENT)
Dept: LAB | Facility: HOSPITAL | Age: 65
End: 2020-05-01

## 2020-05-01 ENCOUNTER — HOSPITAL ENCOUNTER (OUTPATIENT)
Dept: ONCOLOGY | Facility: HOSPITAL | Age: 65
Setting detail: INFUSION SERIES
Discharge: HOME | End: 2020-05-01

## 2020-05-01 VITALS
TEMPERATURE: 98.2 F | OXYGEN SATURATION: 97 % | BODY MASS INDEX: 30.66 KG/M2 | DIASTOLIC BLOOD PRESSURE: 72 MMHG | HEIGHT: 65 IN | RESPIRATION RATE: 20 BRPM | SYSTOLIC BLOOD PRESSURE: 125 MMHG | HEART RATE: 59 BPM | WEIGHT: 184 LBS

## 2020-05-01 DIAGNOSIS — Z79.01 LONG TERM (CURRENT) USE OF ANTICOAGULANTS: Primary | ICD-10-CM

## 2020-05-01 DIAGNOSIS — C56.1 MALIGNANT NEOPLASM OF RIGHT OVARY (HCC): Primary | ICD-10-CM

## 2020-05-01 DIAGNOSIS — I82.722 CHRONIC DEEP VEIN THROMBOSIS (DVT) OF LEFT UPPER EXTREMITY, UNSPECIFIED VEIN (HCC): ICD-10-CM

## 2020-05-01 DIAGNOSIS — C56.1 MALIGNANT NEOPLASM OF RIGHT OVARY (HCC): ICD-10-CM

## 2020-05-01 DIAGNOSIS — D51.0 PERNICIOUS ANEMIA: ICD-10-CM

## 2020-05-01 DIAGNOSIS — E83.42 HYPOMAGNESEMIA: ICD-10-CM

## 2020-05-01 LAB
BASOPHILS # BLD AUTO: 0.01 10*3/MM3 (ref 0–0.2)
BASOPHILS NFR BLD AUTO: 0.3 % (ref 0–1.5)
DEPRECATED RDW RBC AUTO: 57.7 FL (ref 37–54)
EOSINOPHIL # BLD AUTO: 0.13 10*3/MM3 (ref 0–0.4)
EOSINOPHIL NFR BLD AUTO: 4.3 % (ref 0.3–6.2)
ERYTHROCYTE [DISTWIDTH] IN BLOOD BY AUTOMATED COUNT: 15.9 % (ref 12.3–15.4)
HCT VFR BLD AUTO: 33.1 % (ref 34–46.6)
HGB BLD-MCNC: 10.5 G/DL (ref 12–15.9)
INR PPP: 1.7
LYMPHOCYTES # BLD AUTO: 1.03 10*3/MM3 (ref 0.7–3.1)
LYMPHOCYTES NFR BLD AUTO: 33.8 % (ref 19.6–45.3)
MAGNESIUM SERPL-MCNC: 1.5 MG/DL (ref 1.6–2.4)
MCH RBC QN AUTO: 32.4 PG (ref 26.6–33)
MCHC RBC AUTO-ENTMCNC: 31.7 G/DL (ref 31.5–35.7)
MCV RBC AUTO: 102.2 FL (ref 79–97)
MONOCYTES # BLD AUTO: 0.33 10*3/MM3 (ref 0.1–0.9)
MONOCYTES NFR BLD AUTO: 10.8 % (ref 5–12)
NEUTROPHILS # BLD AUTO: 1.55 10*3/MM3 (ref 1.7–7)
NEUTROPHILS NFR BLD AUTO: 50.8 % (ref 42.7–76)
PLATELET # BLD AUTO: 80 10*3/MM3 (ref 140–450)
PMV BLD AUTO: 9.6 FL (ref 6–12)
RBC # BLD AUTO: 3.24 10*6/MM3 (ref 3.77–5.28)
WBC NRBC COR # BLD: 3.05 10*3/MM3 (ref 3.4–10.8)

## 2020-05-01 PROCEDURE — 85025 COMPLETE CBC W/AUTO DIFF WBC: CPT | Performed by: NURSE PRACTITIONER

## 2020-05-01 PROCEDURE — 36415 COLL VENOUS BLD VENIPUNCTURE: CPT

## 2020-05-01 PROCEDURE — 25010000002 CYANOCOBALAMIN PER 1000 MCG: Performed by: NURSE PRACTITIONER

## 2020-05-01 PROCEDURE — 36416 COLLJ CAPILLARY BLOOD SPEC: CPT

## 2020-05-01 PROCEDURE — 83735 ASSAY OF MAGNESIUM: CPT | Performed by: NURSE PRACTITIONER

## 2020-05-01 PROCEDURE — 85610 PROTHROMBIN TIME: CPT

## 2020-05-01 PROCEDURE — 96372 THER/PROPH/DIAG INJ SC/IM: CPT

## 2020-05-01 RX ORDER — CYANOCOBALAMIN 1000 UG/ML
1000 INJECTION, SOLUTION INTRAMUSCULAR; SUBCUTANEOUS ONCE
Status: CANCELLED | OUTPATIENT
Start: 2020-05-01

## 2020-05-01 RX ORDER — CYANOCOBALAMIN 1000 UG/ML
1000 INJECTION, SOLUTION INTRAMUSCULAR; SUBCUTANEOUS ONCE
Status: COMPLETED | OUTPATIENT
Start: 2020-05-01 | End: 2020-05-01

## 2020-05-01 RX ADMIN — CYANOCOBALAMIN 1000 MCG: 1000 INJECTION INTRAMUSCULAR; SUBCUTANEOUS at 11:49

## 2020-05-01 NOTE — PROGRESS NOTES
Pt here to received mag infusion but mag level on 4/24 was 1.8 per Care plan no mag needed. Mag level drawn today and pt received Vitamin B 12 injection today. HGB over 10 so no procrit given.

## 2020-05-01 NOTE — ADDENDUM NOTE
Encounter addended by: Katie Griffin RN on: 5/1/2020 2:43 PM   Actions taken: Charge Capture section accepted

## 2020-05-04 ENCOUNTER — OFFICE VISIT (OUTPATIENT)
Dept: ONCOLOGY | Facility: CLINIC | Age: 65
End: 2020-05-04

## 2020-05-04 ENCOUNTER — TELEPHONE (OUTPATIENT)
Dept: ONCOLOGY | Facility: CLINIC | Age: 65
End: 2020-05-04

## 2020-05-04 DIAGNOSIS — C56.1 MALIGNANT NEOPLASM OF RIGHT OVARY (HCC): Primary | ICD-10-CM

## 2020-05-04 PROCEDURE — 99214 OFFICE O/P EST MOD 30 MIN: CPT | Performed by: INTERNAL MEDICINE

## 2020-05-04 NOTE — PROGRESS NOTES
Hematology/Oncology Outpatient Follow Up    PATIENT NAME:Tahira Zimmerman  :1955  MRN: [id number]  PRIMARY CARE PHYSICIAN: Noemy Queen MD  REFERRING PHYSICIAN: Noemy Queen MD    Chief Complaint   Patient presents with   • Follow-up   • Chemotherapy     ovarain cancer     You have chosen to receive care through a telephone visit. Do you consent to use a telephone visit for your medical care today? Yes     HISTORY OF PRESENT ILLNESS:   1. Recurrent ovarian cancer diagnosis established in 2013.  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  The patient was treated with surgery and then postoperatively with adjuvant chemotherapy consisting of Carboplatin and Taxol.  Six months later, she had recurrence of disease intra-abdominally between the rectum and vagina, which was treated with intraabdominal peritoneal chemotherapy.  She had been free of disease since that time.  She reported feeling a mass in the left side of her abdomen approximately six months ago.  This gradually grew and in early 2013 she had her daughter feel the mass.  The daughter works for Dr. David Rogers and the patient at that time also complained of intermittent rectal bleeding.  Consultation with Dr. David Rogers was obtained.  The patient had a CT scan of the abdomen and pelvis performed on 13 revealing left lower quadrant mass measuring 9.7 x 9.3 x 6 cm demonstrating areas of dense calcification, soft tissue density and cystic density within it.  Stromal tumor is felt to be most likely a possibly fibroma.  It is generated with necrosis and calcification.  Possible palpable mass in the rectus muscle is seen with calcification and deformity of the rectus muscle and just below this a second mass intra-abdominally was seen measuring 2 cm in the greatest diameter suspicious for second intraabdominal tumor.  The mass  separate from the largest mass measuring 2.6 cm in the dependent portion of pelvis is seen on the right of the midline.  No retroperitoneal lymphadenopathy was seen.  No free air, free fluid or other abnormality was present.  The patient was scheduled for an outpatient colonoscopy, but had syncopal episode while sitting in the toilet the night before and was brought to the emergency room early in the morning by her daughter and son-in-law.  The patient had apparently stopped breathing for a few seconds and did not have a pulse, but started breathing before CPR could be initiated.  In the emergency room, the patient had an EKG revealing sinus rhythm nonspecific T-wave flattening; metabolic panel revealed a glucose of 148, creatinine of 1.3, CBC was normal.  She was treated with intravenous fluid.  The patient’s case was then discussed with Dr. Anabell Monique and patient was subsequently admitted to [facility].  The patient underwent a colonoscopy by Dr. David Rogers on 06/27/13 revealing sigmoid diverticulosis and grade II internal and external hemorrhoids, but no mass or colitis was seen.  CT-guided biopsy of the mass was performed on 06/27/13 as well revealing metastatic papillary adenocarcinoma with numerous psammoma bodies.  Pathology comment stated prior records were not available; however, with history of ovarian cancer the current biopsy would be consistent with metastases from that malignancy.  Oncology consult was obtained and I initially saw the patient on 06/27/2013 where the patient had claimed to have little bit of pain in the area of disease.  She reported being frequently constipated, denies any weight loss or fevers.  She did report having some night sweats, but thought that was because of her menopause.  On 06/27/13 metastatic workup including labs and CT scans were initiated.  CT scan of the chest on 06/27/13 revealed no acute disease in the chest.  A 1.4 cm size focal area of  decreased density was noted in the lateral aspect of the right lobe of the liver consistent with a benign cyst or hemangioma.  There was a very small hiatal hernia measuring 2.2 cm in diameter.  A CT scan of the head performed 06/27/13 revealed no acute intracranial abnormality noted.  CA-125 was 40 units/ml (0-35), CA 19-9 was 11.8 units/ml (0-35), CEA level was 0.8 ng/ml (0-3), TSH level was 1.09 IU/ml (0.34-5.6).  The patient was in workup for the syncopal episode, a carotid Doppler was performed on June 28 revealing an essentially normal study showing a reduction in diameter from 0-15% bilaterally.  A Holter monitor was completed on 06/27/13, which was unremarkable except for a few premature atrial contractions.  The patient was felt stable and subsequently was discharged home on 06/28/13.  Post discharge, the patient was seen at [facility] on 07/05/13 by Dr. Kathryn Thrasher who discussed treatment options with the patient including surgery.  The patient is in the office today 07/16/13 in follow up post hospitalization.  She is reporting that surgery is planned for July 30th of this month at .  · 7/30/13 to 8/3/13 - The patient was in [facility].  The patient was admitted for surgery for her recurrent ovarian cancer.  The patient had exploratory laparotomy, omentectomy, bowel resection, liver biopsy and appendectomy.  Pathology revealed of the omentum metastatic serous carcinoma of the transverse colon, segmental resection showed metastatic serous carcinoma involving mesenteric fat.  The liver wedge resection showed fibrosclerotic thickening of the hepatic capsule.  Benign liver parenchyma.  No evidence of metastatic tumor.  Appendix showed fibrous obliteration of the lumen.  Negative for metastatic carcinoma.  Rectum and sigmoid colon segmental resection showed metastatic serous carcinoma invading the colorectal mucosa uninvolved by tumor.  Vaginal mass showed  metastatic serous carcinoma.  Margins are positive for tumor.  · 9/24/13 - Chemotherapy orders were written for patient to start carboplatin 550 mg IV day one and Taxol 330 mg IV day one to be cycled every three weeks.  · 10/10/13 - The patient started cycle #1 of chemotherapy consisting of Carboplatin and Taxol.  · 09/25/13 -  is 10.6 normal.  · 10/01/13 - CT of the chest, abdomen and pelvis revealed new reticular nodular occupancy in the anterior segment of the right upper lobe, could reflect an inflammation or infectious etiology or could reflect unusual persistence of metastatic tumor.  New liver lesions, the smaller one appears to be implant on the surface of the liver, the larger may also represent an implant including the intrahepatic.  Several small mesenteric nodes seen throughout the abdomen and pelvis.  · 10/02/13 - Port placed by Dr. Sen.  · 10/24/13 - Orders written to start Neupogen daily and if more than three Neupogen are needed to give Neulasta after next chemo.  ANC is 0.15.  · 10/31/14 - Patient given cycle 2 of chemotherapy with Taxol and Carboplatin.  · 11/21/13 - The patient started cycle 3 of chemotherapy consisting of Carboplatin and Taxol.  · 11/26/13 - CT scan of chest, abdomen and pelvis revealed no evidence of active disease in the chest.  Reticulonodular opacity has resolved.  Metastatic lesion impressing upon the hepatic dome similar to prior exam.  No evidence of a change.  No evidence of new disease.  Postsurgical changes in the pelvis and throughout the retroperitoneum in the large bowel.  Finding containing anterior abdominal wall hernia containing fat tissue and enhancing vessels, 3 cm in diameter.  · 12/2/13 - Orders written to start Neupogen per protocol as well as Aranesp due to low hemoglobin and ANC.  ANC is 0.14.  Hemoglobin is 9.7.  · 12/11/13 - Orders written to hold chemotherapy until next week and decrease dose by 20% due to low platelet count.  Platelet count is  85,000.  · 12/16/13 - The patient received cycle 4 of Carboplatin and Taxol at a 20% dose reduction so Taxol dose is 260 mg and Carboplatin is 440 mg.  · 12/16/13 - CA-125 12.6 (N).  · 12/19/13 - PET/CT scan.  Impression:  1. There is no evidence of hypermetabolism in the liver.  Specifically of the dominant lesion in or adjacent to the right hepatic dome that was seen and described on the recent CT study of 11/26/2013.  It is photopenic relative to the surrounding liver.  2.  There is no evidence of hypermetabolic soft issue mass lesion or free fluid in the abdomen or pelvis.  3.  The tonsillar tissues are slightly enlarged and have moderate activity level.  This maybe physiologic.  Correlation with oral cavity, examination is recommended.  4.  Mild amount of activity in the right level II jugular lymph chain without focally enlarged lymph nodes is of questionable significance.  · 1/6/13 - The patient received cycle 5 of chemotherapy with Taxol and Carboplatin.  · 1/27/14 - The patient started on cycle 6 of Taxol and Carboplatin.  · 2/18/14 - CT of the abdomen and pelvis with contrast; stable lesions impressing upon the hepatic dome, unchanged compared to the prior exam.  Multiple anterior abdominal wall hernias re-demonstrated.  Those above the umbilicus contain omental fat.  Below and to the left of the umbilicus as anterior abdominal hernia containing omental fat in nondilated small bowel which is larger than seen previously.  · 2/20/14 - The patient received cycle 7 of chemotherapy with Taxol and Carboplatin.   · 3/11/14 - Order written to discontinue chemotherapy due to patient has completed 7 cycles of chemotherapy and CT scans suggest possible remission.  · 3/11/14 -  11.0 (N).  · 06/05/14 - CT scan of the chest abdomen and pelvis with contrast: There are multiple anterior abdominal wall hernias.  There are 2 larger anterior abdominal wall hernias at the level of the transverse colon, which contain  omental fat.  There are at least 2 small anterior abdominal wall hernias that contain omental fat.  There is a moderate sized anterior abdominal wall hernia at the level of the umbilicus, eccentric to the left, which contain non-dilated bowel.  Interval decrease in the size of two deposit impressing on the liver.  The third tiny deposit has been stable.  · 8/19/14 -  10.4 (N).  · 12/19/14 -   10.0 (N)  · 1/7/15 - CT chest, abdomen and pelvis with stable appearance of the chest with several micronodules. Small hiatal hernia, slightly larger than previous exam, increased from 1.5 to 2.5 cm in diameter. Bochdalek hernia unchanged. Multiple hepatic lesions. The two dominant lesions at the right hepatic dome had decreased in size from previous. The remaining tiny nodules measured 3-5 mm in diameter and were unchanged. There was no evidence of new intra-abdominal or pelvic mass or free fluid. There were multiple anterior abdominal wall hernias which had increased in size.   · 7/27/15 - Comprehensive metabolic panel with no significant abnormalities. CA-125 of 21 (0-35).   · 10/26/15 - WBC 6, hemoglobin 13, platelet count 204,000. Heart rate 47. CA-125 of 20 (0-35).    · 2/18/16 - CT chest with contrast with stable micronodular density seen in the lungs without significant change from prior exam. CT abdomen and pelvis with regression of liver lesions with no new evidence of metastasis. Multiple ventral hernias again noted without significant change from prior exam. Creatinine 0.9 (0.6-1.3).    · 2/25/16 - Patient hospitalized at [facility] between 2/25/16 and 2/27/16 with pyelonephritis secondary to E-coli. She was given two days of IV Rocephin and then placed on oral Levaquin. She was asked to hold her Coumadin, but then had nausea and vomiting because of Levaquin and stopped the Levaquin also. Her CA-125 was 32 (0-35) and CEA 1.3 (0-5). Her urine grew >100,000 E-coli. CT of the abdomen and pelvis  was done which revealed 4.1 x 1.8 cm sized mild ovoid area of decreased density in the liver parenchyma along the anteromedial aspect of the dome of the right lobe of the liver, unchanged significantly since the previous study of 2/18/16. There was suspicion of a very small pericardial effusion. There was suspicion of a small hiatal hernia. There were several hernias in the anterior abdominal wall. A 3.5 x 3.1 cm incised well-circumscribed abnormal density in the left retroperitoneal fatty soft tissue at the level of the left iliac crest was suggestive of tumor recurrence. There was an abnormal density in the left retroperitoneal soft tissues in the left flank that partially surrounded the left kidney and extended downward to the left pelvic area. Diverticulosis of the distal descending colon and sigmoid colon were seen.     · 3/8/16 - Patient prescribed Bactrim DS 1 p.o. b.i.d. for 10 days for pyelonephritis, which was partially treated with Rocephin and Levaquin. Urine with 40,000 E-coli treated with Macrobid for 7 days.  of 20 (0-35).    · 3/31/16 - PET scan with area of low density anteriorly in the right lobe of the liver with no significant radiopharmaceutical uptake to suggest malignancy. Multiple round soft tissue density masses showing increased radiopharmaceutical activity and multiple anterior abdominal wall hernias.   · 5/10/16 - Patient complains of venous enlargement of the left chest wall around the port. Has not been seen by  yet, but has an appointment on 5/23/16. Complained of a cough.   · 5/12/16 - Infusaport contrast study with no evidence of fibrin sheath. Chest x-ray with mild cardiac prominence.   · 5/23/16 - Patient seen in consultation by Sheng Bowman M.D. at [facility] and a chemotherapy trial recommended.   · 6/1/16 -   of 27.1 (0-35).    · 6/14/16 - WBC 5.71, hemoglobin 12.4, platelet count 188,000. Patient is scheduled for surgery at  on 6/16/16 after further  discussion with  physicians. Discussed adjuvant chemotherapy.   · 6/16/16 - Excisional biopsy of abdominal wall mass [facility] revealing metastatic high-grade papillary serous carcinoma.   · 7/7/16 - Received a call from the patient that Tramadol was not working and that she was given Norco #24 after her biopsy at  which did help her pain. Patient prescribed Norco 5/325 one p.o. q. 4-6 hours p.r.n. #60 with no refills. Echocardiogram with left ventricular inferior wall hypokinesis with ejection fraction of 50-55%.   · 7/8/16 - Patient seen in consultation by Sheng Bowman M.D. in consultation at  for metastatic high-grade papillary serous carcinoma diagnosed by excisional biopsy on 6/16/16 with carcinomatosis and patient not a surgical resection candidate. Genetic sequencing and susceptibility testing of tumor was ordered. Biopsy was done of anterior abdominal wall.   · 7/7/16 - Echocardiogram with left atrial enlargement. Mild mitral regurgitation. Left ventricular proximal inferior wall hypokinesis with ejection fraction of 50-55%.   · 7/11/16 - While waiting for genomics results, in order not to delay treatment further, order written for Taxotere 60 mg/M2 IV over one hour followed by Carboplatin AUC 5 over one hour, cycles to be repeated every three weeks.  Comprehensive metabolic panel normal.  26 (0-35).    · 725/16 - Pain contract signed for use of Norco.   · 8/5/16 - Patient stated that she experienced a red rash and was itchy post taking Norco. Norco discontinued and Tramadol refilled.   · 8/16/16 - Patient started cycle 1 chemotherapy. WBC 7.1, hemoglobin 11.2, MCV 88.7, platelet count 94,000. Patient complained of nausea for a week post chemo. Already getting Emend, Aloxi and Decadron. Added Omeprazole 40 mg daily orally.   · 8/17/16 - Urine culture with >100,000 E-coli.   · 8/25/16 - Cycle 2 chemotherapy given.   · 9/9/16 -  Magnesium 1.3, replaced intravenously. Potassium 3.4 (3.6-5.1), increased oral potassium supplementation.    · 9/16/16 - Cycle 3 chemotherapy given.   · 9/19/16 - Comprehensive metabolic panel with no significant abnormality.   · 9/20/16 - CT abdomen and pelvis with evidence of progressive metastatic disease in the abdomen and pelvis with interval enlargement of several soft tissue attenuations and subcutaneous nodules in the anterior abdominal wall. Interval enlargement of a left pelvic soft tissue attenuation mass. A lentiform area of low attenuation in the dome of the liver stable in size. Multiple anterior abdominal wall hernias containing fat and/or bowel. Marked pelvic floor laxity. CT chest negative for evidence of metastatic disease. Tiny pulmonary nodules in the right lung stable since 2013 consistent with a benign etiology.   · 9/23/16 - Macrobid 100 mg p.o. b.i.d. x7 days prescribed for UTI. Current chemotherapy discontinued after discussion and new chemotherapy orders written. Carboplatin AUC-5 IV day 1 and Doxil (Liposomal Doxorubicin) 30 mg/M2 IV day 1 to cycle q. 21 days.  of 18 (0-35). Magnesium 1.6, replaced intravenously. Comprehensive metabolic panel with potassium 3.4 (3.6-5.1).     · 10/13/16 - Patient started on cycle 1 of Carboplatin and Liposomal Doxorubicin followed by Neulasta.   · 11/3/16 - Cycle 2 Carbo and Doxil given.   · 11/17/16 - Magnesium 1.0, replaced intravenously. Oral potassium supplement increased.   · 11/29/16 - CT chest with small non-calcified right pulmonary nodules unchanged. Benign bone island in the midthoracic vertebral body along with benign bony hemangiomas in the middle thoracic vertebral bodies. CT abdomen and pelvis with mild progressive metastatic disease from CT of September 2016. Anterior abdominal wall mass superiorly mildly increased in size with new area noted as described measuring 3 x 1.7 cm. 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 Stable area of decreased uptake in the dome of the liver not hypermetabolic on recent PET scan, likely representing cyst or focal fatty infiltration. Stable small hiatal hernia, fat-containing Bochdalek’s hernia and anterior abdominal wall hernias with stable pelvic floor relaxation.    · 12/1/16 - Cycle 3 chemotherapy given.   · 12/8/16 - Current chemotherapy discontinued and patient started on Gemcitabine 1000 mg/M2 IV days 1, 8, 15 q. 28 days.   · 12/22/16 - Patient seen in followup by Juliana Roy M.D. at the pain clinic, treated with Oxycodone and Lyrica. Transaminases normal. Patient started cycle 1 chemotherapy.   · 12/29/16 - Magnesium 1.1 (1.8-2.5), replaced intravenously.   · 1/5/17 - Cycle 1 day 15 chemotherapy held as patient leaving for vacation to [place]. Chemotherapy dose decreased by 20%. Patient complains of diarrhea for which Imodium prescribed.   · 1/11/17 - BRCA-1 and BRCA-2 negative.   · 1/12/17 - Echocardiogram with ejection fraction 60% or greater.   · 1/19/17 - Comprehensive metabolic panel with no significant abnormality. Patient started cycle 2 chemotherapy.   · 2/2/17 - Urine with >100,000 E-coli treated with Cipro 500 mg p.o. b.i.d. x1 week.   · 2/6/17 - Magnesium 1.1 (1.8-2.5), replaced intravenously.    · 2/23/17 - Patient started cycle 3 chemotherapy.   · 2/27/17 - CT chest with interval development of too numerous to count very small pulmonary nodules, ill-defined ground glass opacities concerning for development of pulmonary metastatic disease. Stable left-sided chest port. CT abdomen and pelvis with stable appearance of multiple small soft tissue densities within the abdomen near midline subcutaneous fat of the abdominal wall. Interval decrease in size of largely calcified left pelvic mass and multiple fat in bowel containing small ventral hernias.   · 3/6/17 - Pulmonary consultation obtained for lung nodules. Discussed CT results with  patient. Decision made to continue present chemotherapy until further lung evaluation as abdominal disease better.  of 18 (0-35).    · 3/16/17 - Patient started cycle 4 chemotherapy, but treatment held from day 8 onwards because of hospitalization.   · 3/19/17 - Patient was hospitalized at [facility] between 3/19/17 and 3/25/17 with chest pain. Chest x-ray had revealed increased mild bibasilar airspace disease. Troponin I and EKG’s were negative. INR was elevated. Patient was treated with antibiotics. EGD was performed revealing small hiatal hernia and esophageal dilatation was performed. Bronchoscopy was performed also with no intrabronchial lesions identified. IgA was 51 (), IgG 320 (600-1500) and IgM 19 (). Lexiscan nuclear stress test had no evidence of reversible myocardial ischemia with normal left ventricular ejection fraction of 58%. CT chest had development of patchy bilateral ground glass opacities most pronounced involving the left upper lobe and bilateral lower lobes. Multiple tiny bilateral pulmonary nodules were less conspicuous. The patient underwent a bronchoscopy by Jerica Esparza M.D. with right upper lobe bronchoalveolar lavage revealing benign squamous cells and bronchial cells with pulmonary macrophages present. There was mild acute inflammation. Negative for malignant cells. Prilosec was changed to Famotidine for hypomagnesemia.    · 4/6/17 - Magnesium 1.2 (1.8-2.5). Comprehensive metabolic panel with no significant abnormality. Patient seen in follow-up by Fito Ram M.D. at [facility]. Treated with Lyrica and Oxycodone.    · 5/4/17 - Patient complains of a rash itching on her right upper arm. Eczematous rash noted and patient prescribed Cyclocort 0.1% b.i.d. Magnesium infusions continued with each chemo. Order written to resume chemotherapy.   · 5/16/17 - Cycle 5 chemotherapy started.   · 5/26/17 - Magnesium 1.4 (1.8-2.5), replaced intravenously. Potassium 2.9  (3.6-5.1), KCL increased to 20 mEq three in the morning and three in the evenings.   · 5/30/17 - PET scan with remaining metabolic activity within the nodular areas. The suggested mass which is partially calcified and necrotic within the left aspect of the pelvis seems slightly larger with increased metabolic activity. There was more calcification in these areas compared to prior study, suggesting inflammation.      · 6/8/17 - Patient complaining of shortness of breath. Does take HCTZ at home. Chest x-ray ordered and chemotherapy continued along with weekly magnesium replacement. Chest x-ray with no acute cardiopulmonary abnormalities.   · 6/13/17 - Patient received cycle 6 of chemotherapy.   · 7/31/17 - WBC 5.0, hemoglobin 11.2, MCV 89.7, platelet count 217,000. Patient is to resume chemotherapy starting with cycle 7 on Wednesday of this week.  of 19 (<35). Potassium 3.3 (3.5-5.3).     · 8/2/17 - Patient began cycle 7 of chemotherapy.   · 8/30/17 - Patient started cycle 8 chemotherapy.   · 9/5/17 - Patient seen in followup at Pain Management by Fito Ram M.D. and continued on treatment with Oxycodone and Lyrica.   · 9/13/17 - Patient was hospitalized at [facility] for a day with dizziness secondary to dehydration, hypokalemia and anemia. She was given 1 unit of packed red blood cells and electrolytes were replaced. Chest x-ray revealed a subtle opacity in the left lateral lung base favored to represent atelectasis. Magnesium 1.3 (1.5-2.5), patient continued on replacement.    · 9/22/17 - Chemotherapy and magnesium replacement continued with decrease in chemotherapy dose by 10% secondary to cytopenias.   · 9/25/17 - Cycle 9 chemotherapy started.   · 10/12/17 - CT of the chest with contrast showed several tiny pulmonary nodules. One within the right lower lobe appears new from prior exams. Two adjacent foci of nodularity within the medial right lower lobe suggestive of minor infectious or inflammatory  process. CT of the abdomen and pelvis with contrast which showed soft tissue nodules along the anterior abdominal and pelvic walls unchanged from previous scan. Also a partially calcified mass within the left pelvis unchanged. No acute process identified within the abdomen or pelvis. Several left paracentral ventral hernias unchanged. No evidence of acute bowel obstruction.   · 10/16/17 - Order written for Levaquin 500 mg p.o. daily as the patient states that she has had a productive cough, fever and chills and with the results of the CT scan showing a possible infectious versus inflammatory process. Order also written to continue chemotherapy and magnesium replacement as previously prescribed.   · 10/23/17 - Cycle 10 chemotherapy started.   · 11/19/17 - Patient went to the Emergency Room at [facility] complaining of right lower extremity redness and fever for a day. Venous Doppler had no evidence of a DVT and patient was discharged home on Clindamycin.   · 11/21/17 -  of 17 (0-35).   · 12/4/17 - Cycle 11 chemotherapy started.   · 12/20/17 - PET scan with multiple hypermetabolic soft tissue nodules abutting the anterior abdominal wall, stable from previous examination. Peripherally calcified left lower quadrant mass near the ascending colon stable in size demonstrating no hypermetabolic uptake. Previously had maximum SUV of 7. Right medial basilar pulmonary nodules resolved.   · 12/22/17 - CT left lower extremity with soft tissue swelling with skin thickening present along the anterior and medial aspect of the leg, slightly more pronounced around the palpable marker seen within the upper medial aspect of the lower extremity.   · 12/26/17 - Elastic stockings prescribed for lower extremity edema.   · 1/8/18 - Patient hospitalized at [facility] between 1/8/18 and 1/11/18 with fever of up to 101 degrees and painful rash on the lower extremities of several weeks’ duration. She was found to be hypokalemic and MRI of the lower  extremities revealed thickening and swelling. Chest x-ray had no acute cardiopulmonary abnormality. Skin biopsy was performed by Surgery revealing stasis dermatitis and no evidence of malignancy. Infectious Disease consultation was obtained and IV antibiotics were stopped. Blood cultures had no growth.   · 1/22/18 - Patient asked to start wearing elastic stockings which she has not started yet. She was given a prescription for Dyazide 1 p.o. daily.   · 2/26/18 - Ordered chemo to resume again. Patient unaware that she was supposed to resume chemo after her last visit in January. Continue magnesium infusions on day 1, 8 and 15. Prescribed Levaquin 500 mg p.o. daily x10 days for productive cough x1 week. History of viral illness consistent with influenza.  of 18 (0-35). Chest x-ray with no acute process.    · 3/5/18 - Cycle 12 chemotherapy started.   · 3/26/18 - Options discussed with patient. Decision made to discontinue IV Gemzar and orders written to start on Niraparib (Zejula) 300 mg p.o. daily as maintenance therapy.   · 4/11/18 - Niraparib discontinued due to insurance denial. Orders written to start Carboplatin-AUC 5 IV day 1 cycling every 21 days. Orders written for Neulasta 6 mg subcutaneous kit day 1 with palliative treatment intent and expected duration of treatment three months.   · 4/12/18 - CT chest, abdomen and pelvis with contrast revealed numerous tiny centrilobular nodules in the lungs favored to reflect infectious or inflammatory process. Nodules ranged 1-3 mm in size and are new from prior studies with metastatic disease not entirely excluded. Stable small hiatal hernia. Interval progression of metastatic disease involving the subcutaneous tissues at the ventral abdominal wall. Multiple soft tissue density nodules previously shown to be hypermetabolic on PET scan. New small crescent of low attenuation material along the periphery of the spleen with similar finding at the dome of the liver.  Findings are concerning for peritoneal metastases. Density of the dome of the liver remained unchanged from multiple prior studies. Stable calcified mass in the left pelvic sidewall. Urinary bladder wall thickening.   · 4/23/18 - Cycle 1 chemotherapy with Carboplatin administered along with Neulasta injection.   · 4/26/18 - Neulasta discontinued due to insurance denial.   · 5/14/18 - Patient received cycle 2 Carboplatin.   · 6/5/18 - Cycle 3 day 1 chemotherapy with Carboplatin administered.   · 6/20/18 - CT chest, abdomen and pelvis at [facility] showed re-demonstration of soft tissue mass in the ventral wall hernia left of the midline as well as the dome of the liver, likely representing metastatic disease similar as compared to the prior study. Additional calcified lesions present in the upper left hemipelvis and along the anterior abdominal wall to the right of the midline also likely representing metastatic disease. No definite new lesions were identified. Moderate to large stool burden likely related to mild constipation was present. No suspicious lung nodules were identified. The previously-described ground glass nodules appeared to have resolved. There was a stable subpleural nodule in the right upper lobe measuring 3 mm present since 2014 without significant changes.   · 6/26/18 - Cycle 4 day 1 Carboplatin administered with addition of Neulasta for febrile neutropenia prophylaxis.   · 6/29/18 - Reviewed CT scans with patient. Orders written to discontinue Carboplatin and Neulasta and plan to start Niraparib 300 mg by mouth daily as maintenance therapy. Prescribed Amlodipine 2.5 mg p.o. daily for chemo-induced hypertension.   · 7/18/18 - Orders written to increase weekly Magnesium Sulfate to 3 g IV weekly due to continued hypomagnesemia.   · 8/1/18 - Patient reports she has not received Niraparib (Zejula) to date.   · 8/30/18 - Patient’s phone was not working so though Niraparib approved, the drug  company had not been able to get ahold of her. Patient supplied with drug company number so that she can start taking the pills.   · 9/6/18 -  of 20 (N).   · 9/18/18 - Urinalysis done for dysuria and back pain. Urine culture grew 20,000 to 50,000 colonies of urogenital ruy. Prescribed Bactrim DS one tablet twice daily for one week.   · September 2018 - Patient initiated on Zejula 300 mg p.o. daily.   · 10/1/18 - WBC 3.5, hemoglobin 12, platelet count 74,000. Niraparib (Zejula) dose held and then resumed when platelets above 100,000 at a dose of 200 mg daily.   · 10/15/18 - Patient received Niraparib (Zejula) at 200 mg p.o. daily with a platelet count of 198,000.   · 11/7/18 - WBC 6, hemoglobin 11.6, MCV 96.2, platelet count 137,000. Patient claims to have stopped taking Niraparib a few days prior because of cough. Asked to resume taking it. Ciprofloxacin 500 mg p.o. twice daily for one week for bronchitis.   · 12/3/18 - Magnesium Sulfate infusions changed to every two weeks, to send mag level before and give 3 grams. DC’d Magnesium Oxide and started Magnesium Plus Protein two pills twice daily.   · 1/4/19 - CT chest, abdomen and pelvis with new patchy nodular areas of airspace consolidation within the bilateral upper lobes, left greater than right, favored to be infectious or inflammatory. Interval enlargement of the peritoneal soft tissue masses within the pelvis compatible with progression of disease. Enlarging ventral hernia now containing multiple loops of small bowel and colon.   · 1/7/19 - Patient has not been coming in for weekly magnesium replacement as nursing has not been able to get ahold of her. Results of CT’s discussed with patient. Decision made to change her to IV chemotherapy with Carboplatin AUC-5 day 1 and pegylated liposomal Doxorubicin (Doxil) 30 mg/M2 IV day 1 to cycle every four weeks. Patient made aware of the limited choices of her treatment available.   · 1/31/19 - Echocardiogram  showed LVEF 50-55% and otherwise normal echo Doppler study.   · 2/4/19 - New chemotherapy not initiated to date with difficulty contacting patient for echocardiogram. Neulasta On-Pro 6 mg ordered with chemotherapy due to extensive prior exposure to chemotherapy, increasing risk of febrile neutropenia, history of neutropenic events on prior chemotherapy regimens.   · 2/15/19 - Patient started cycle 1 chemotherapy with Neulasta support.   · 3/6/19 -  of 28 (0-35).   · 3/15/19 - Cycle 2 Carboplatin with Liposomal Doxorubicin (Doxil) and Neulasta support initiated.     · 4/10/19 - Patient was requested to followup with Dr. Queen regarding hypotension and blood pressure medication dosing. Patient appears to be tolerating treatment well with cytopenias not requiring dose adjustments. No Doxil-related skin or mucus membrane toxicities or other significant toxicities to date.   · 4/12/19 - Cycle 3 chemotherapy given.   · 4/16/19 - CT chest, abdomen and pelvis with no evidence of active metastasis to the chest. Several tree-in-bud nodules within the periphery of the inferior right upper lobe likely infectious or inflammatory. Disease burden within abdomen and pelvis stable to slightly increased from prior CT. Specifically, a soft tissue mass along the right rectus muscle slightly increased in bulk. Multiple ventral hernias, some containing loops of bowel, with no evidence of acute bowel obstruction.   · 5/8/19 - Discussed CT results with patient. Overall stable disease and would like to continue same treatment. Dove soap and Hydrocortisone Cream p.r.n. prescribed for scattered rash on back.   · 5/10/19 - Cycle 4 Carboplatin and Liposomal Doxorubicin initiated.   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 It was ER positive, HER2/zuleima negative, PD-L1 negative. There was TOPO1 positivity and TUBB3 positivity. TMB was low (two mutations per megabyte  MUTS/MB) and MSI was stable. There were 13 therapies considered with increased benefit. The 13 therapies with increased benefit included Fulvestrant, Irinotecan, Letrozole, Topotecan, Anastrazole, Exemestane, Tamoxifen, all of which were referenced to [facility] as well as Abemaciclib, Everolimus, Gefitinib, Palbociclib, Ribociclib and Toremifene.     · 6/5/19 echocardiogram with preserved LV systolic function with EF around 60%.  · 6/7/19 cycle 5 chemotherapy with Neulasta support given.  Patient continued on mag oxide 400 mg p.o. twice daily and weekly IV 3 g mag sulfate infusions for persistent hypomagnesemia.  · 7/5/2019- cycle 6 chemotherapy with carboplatin and doxorubicin with Neulasta port initiated.  Ca125:  21 (0-35).  · 7/23/2019-CT chest abdomen and pelvis compared to CT from 4/16/2019 revealed multiple small pulmonary nodules unchanged from prior examination within the right upper and left lower lobes.  Complex ventral hernia similar to prior exam.  Soft tissue nodule along the right lateral margin of the right of the midline measuring 12 x 10 mm unchanged in size.  Irregular soft tissue nodular thickening along the margin of the most inferior aspect of the complex ventral hernia with thickness measuring up to 13 mm similar to prior examination.  Extensive calcified and soft tissue mass within the left lower quadrant decrease in size now measuring 2.6 x 2.3 cm previously 3.0 x 2.5 cm.  Partially calcified mass involving the right rectus sheath measuring 3.1 x 2.7 cm previously measured 3.3 x 2.9 cm.  Densely calcified mass measuring 2.1 x 1.6 cm unchanged previously measured 2 x 1.7 cm.  Right external iliac chain lymph node measuring 9 mm previously measured 5 mm.  · 8/2/2019 cycle 7 chemotherapy given.  · 8/30/2019-cycle 8 chemotherapy with carboplatin and doxorubicin with Neulasta support given.  · 9/27/2019 cycle 9 chemotherapy given.  · 10/1/19 - Echocardiogram showed Normal LV size and contractility EF  of 60-65%. Normal RV size. Borderline left atrial enlargement. Aortic valve, mitral valve, tricuspid valve appears structurally normal, no significant regurgitation seen.No pericardial effusion seen. Proximal aorta appears normal in size.  · 10/18/19 - Magnesium 1.5 (1.8-2.5).  IV magnesium replacement continued.  · 10/25/2019 cycle 10 chemotherapy given.  · 10/29/2019 patient had CT scan of the chest abdomen and pelvis-there is a new 2 mm nodule in the left lower lobe with a stable 2 mm nodule in the left lower lobe.  Follow-up in 6 months was recommended.  Stable multiple soft tissue density and calcified nodules within the ventral abdominal wall and left retroperitoneal fat consistent with nonviable metastatic disease.  These nodules have either decreased in size or stable.  · 11/22/2019 10 received cycle 11 of chemotherapy with Doxil and carboplatinum with Neulasta  · 12/8/2019 to 12/11/2019 patient was admitted to the hospital for neutropenic fever.  · 12/20/2019 patient received cycle 12 of chemotherapy with Doxil and carboplatinum with Neulasta  · 1/13/20: 2 D echo was normal with EF 56% to 60%  · 1/24/20-Patient received cycle 13 of combination chemotherapy with carboplatinum and Doxil  · 2/3/2020 patient had a  which was 25.4  · 2/21/2020-patient received cycle 14 of chemotherapy with carboplatinum and Doxil  · 3/6/2020 patient had CT scan of the chest, abdomen and pelvis this revealed a 3 mm nodule in the left lower lobe present on prior CT of the abdomen unchanged from July 2019.  Stable 3 mm right upper lobe nodule.  There is a lymph node in the AP window measuring 8 x 9 mm prominent left hilar lymph node measuring 12 mm previously was 7 x 7 mm no significant adenopathy was seen in the abdomen there is an area of irregular soft tissue in the left paramidline ventral hernia which is stable measuring 5.7 cm partially calcified mass in the right rectus measuring 3 x 2.5 cm which is also stable the  prominent lymph nodes in the left mid hilum and mediastinum may be reactive or metastatic disease  · 4/17/2020-patient had cycle 15 of single agent carboplatinum       2. Deep vein thrombosis of left upper extremity diagnosis established in October of 2013.  · 10/16/13 - Venous Doppler of left upper extremity.  Impression:  Deep vein thrombosis involving the subclavian, axillary and brachial veins on the left side, superficial vein thrombosis involving the left basilic vein.  · 10/16/13 - Order written for Lovenox 120 mg subcutaneously daily until INR is in therapeutic range.  Order written for Coumadin 5 mg p.o. daily.  The patient is to follow PT and INR protocol.  · 5/20/16 - Venous Doppler bilateral lower extremities normal.  · 7/30/19 - Patient continued on Coumadin following the INR protocol.      3. Anemia diagnosis established in November 2013 and pernicious anemia diagnosis established March 2016.   · 11/15/13 - Hemoglobin is 7.8.  · 12/2/13 - Orders written to start Aranesp 200 mg subcutaneous every two weeks due to low hemoglobin secondary to chemo induced anemia.  Hemoglobin is 9.7.  Anemia workup is ordered.  · 12/03/13 - Ferritin 179.8 (N), folic acid 8.3 (N), vitamin B12 314, iron 104 (N), TIBC 298 (N), iron saturation 35 (N), Reticulocyte count 0.88 (N).  EPO level 124 (N).  Haptoglobin 22 (H).  · 10/22/14 - Hemoglobin 12.5, hematocrit 37.3, MCV 91.6.  · 10/23/14 - Anemia resolved.   · 3/8/16 - WBC 5.8, hemoglobin 11.7, MCV 90.3, platelet count 245,000. Vitamin B12 of 189 (180-914), ferritin 61 (), iron 91 (), TIBC 345 (228-428).   · 3/15/16 -  ().        · 3/22/16 - Intrinsic factor antibody positive, antiparietal antibody negative. Vitamin B12 at 1000 mcg IM weekly started, but the patient after receiving first dose on 3/22/16 did not come back for injections until 4/13/16.   · 4/13/16 - WBC 5.1, hemoglobin 12.5, MCV 87.9, platelet count 201,000. Patient given B12 injection  and then continued at home.   · 5/10/16 - WBC 5.1, hemoglobin 12.5, platelet count 201,000.   · 6/14/16 - Monthly Vitamin B12 injections continued at home.   · 7/11/16 - WBC 5.48, hemoglobin 12.7, MCV 86.2, platelet count 200,000.   · 8/16/16 - Patient continued on monthly Vitamin B12 injections at home.   · 1/5/17 - WBC 2.6 with 38% neutrophils, 56% lymphocytes, 5% monocytes, hemoglobin 10.5, .3, platelet count 63,000. Patient continued on Vitamin B12 injections 1000 mcg IM monthly at home.   · 3/20/17 - Retic 0.41 (0.5-1.5), creatinine 1.0 (0.4-1.0). Iron 12 (), TIBC 270 (228-428), ferritin 259 (), haptoglobin 340 (), TSH 0.43 (0.34-5.6), folate 6.0 (5.9-24.8). Serum protein electrophoresis revealed decreased gammaglobulins. Serum EDU had no monoclonal gammopathy identified. Stool Hemoccult was negative.   · 6/8/17 - WBC 6.3, hemoglobin 8.3, MCV 92.4, platelet count 50,000. Procrit 40,000 units subq weekly added for chemotherapy-induced anemia. Patient continued on Vitamin B12 at 1000 mcg IM monthly at home.   · 7/5/17 - Iron 33 (), TIBC 328 (228-428), ferritin 211 (), TSH 2.46 (0.34-5.6).       · 7/31/17 - WBC 5.0, hemoglobin 11.2, MCV 89.7, platelet count 217,000. We will continue with the Procrit 40,000 units subq weekly for chemo-induced anemia when the hemoglobin falls below 10.0 and the patient is also to continue with the Vitamin B12 at 1000 mcg IM monthly at home. Creatinine 1.24 (0.5-0.99).    · 8/28/17 - WBC 9.6, hemoglobin 8.7, MCV 93.7, platelet count 133,000. Patient is to continue with the Procrit 40,000 units subq weekly and will receive that today. She is also to continue with the Vitamin B12 at 1000 mcg IM monthly at home.  · 10/16/17 - WBC 7.5, hemoglobin 9.6, MCV 98.4, platelet count 195,000. Patient is to continue with Procrit 40,000 units subq weekly and will receive Procrit today.   · 1/1/18 - Creatinine 1.3 (0.4-1.0).    · 2/19/18 - Procrit given for  hemoglobin 9.9.   · 2/26/18 - Continue Procrit 40,000 units weekly p.r.n. hemoglobin <10. Continue Vitamin B12 at 1000 mcg IM monthly at home.   · 3/26/18 - Hemoglobin 8.3. Procrit dose increased to 60,000 units weekly. Iron 29 (), TIBC 306 (228-428), and iron sat 9% (15-50). Iron 29 (), TIBC 306 (228-428), iron saturation 9% (15-50).   · 3/27/18 - Order written to start Ferrous sulfate 325 mg p.o. b.i.d.    · 4/2/18 - Stool heme negative x3.   · 4/30/18 - Patient had not started Ferrous sulfate 325 mg p.o. b.i.d. and verbalized understanding to do so and to notify the office if side effects are not tolerable.   · 5/24/18 - Patient was hospitalized at [facility] between 5/24/18 and 5/26/18 with dark tarry stool. INR was subtherapeutic. She was given IV Protonix and Protonix 40 mg daily. She underwent an EGD by David Rogers M.D. revealing small hiatal hernia, small submucosal nodule near the cardia, erosive gastritis. Pathology on gastric antrum and body biopsy revealed iron pill gastritis and no evidence of Helicobacter pylori. She then underwent a colonoscopy revealing diverticulosis, hemorrhoids and surgical changes from previous resection. It was recommended to consider outpatient M2A if hemoglobin continued to drop.   · 6/4/18 - Order written to discontinue iron pills and to use Injectafer 750 mg IV days 1 and 8 for low iron sats. Order written for Procrit 40,000 units subq weekly. Iron 65 (), TIBC 328 (228-428), iron saturation 20% (15-50), ferritin 123 ().   · 6/29/18 - Discussed patient’s intolerance of oral iron due to gastritis. Oral iron discontinued with plans for Injectafer should iron level drop in the future.   · 11/7/18 - Patient claims not to be taking Vitamin B12 injections at home. Asked to resume those.   · 12/3/18 - Patient reports she has not been taking her B12 injections for a couple of months. She needs some syringes and needles for that.   · 2/4/19 - Patient is  uncertain if she is currently taking Ferrous Sulfate or not. Patient with history of intolerance and will follow hemoglobin.   · 5/8/19 - Ferritin 64 ().  · 8/27/2019- iron 52 (), iron saturation 14% (15-50), TIBC 364 (228-420), and ferritin 74 ().  Injectafer 750 mg IV day 1 and 8 due to oral iron intolerance ordered.  · 8/30/2019- Injectafer 750 mg IV day 1 given.  Hemoglobin 10.8.  At the end of the infusion heart rate was 48 and the patient reported mild dizziness.  Patient was sent to the ED for evaluation with symptoms resolving rapidly.  Chest x-ray and CT head were negative for acute findings.  · 9/6/2019-Injectafer 750 mg IV day 8 given without adverse effects.  Hemoglobin 9.8.   · 9/25/19 - Iron 85 (). Iron saturation 25 (15-50). TIBC 335 (228-428). Ferritin  694 ().  Hemoglobin 10.3.  · 10/25/2019 hemoglobin 9.7.  Patient started on Retacrit 40,000 units subcu weekly.    Past Medical History:   Diagnosis Date   • Anemia 2013   • Cervical disc disorder 2013    Herniated disc, pinched nerve   • Clotting disorder (CMS/HCC) 2013    Low platelets from chemo   • Colon polyp 2013   • Deep vein thrombosis (CMS/HCC) 2013   • Diverticulosis 2013   • GERD (gastroesophageal reflux disease) 2016   • HL (hearing loss) 2016    I need hearing aids   • Hyperlipidemia 2013    Need my cholesterol rechecked   • Hypertension    • Joint pain 2013    Shoulder pain, torn rotator cuff   • Low back pain 2013   • Neuromuscular disorder (CMS/HCC) 2015    Shingles, pinched nerves in my neck   • Osteopenia 2014   • Osteoporosis    • Ovarian cancer (CMS/HCC)    • Pneumonia 2010    Have had it several times   • Spinal stenosis 2013    Cervical   • Urinary tract infection 2013    Have had several utis       Past Surgical History:   Procedure Laterality Date   • COLON SURGERY     • COLONOSCOPY  05/26/2018   • EXPLORATORY LAPAROTOMY     • HERNIA REPAIR     • HYSTERECTOMY     • OOPHORECTOMY           Current  "Outpatient Medications:   •  acetaminophen (TYLENOL) 500 MG tablet, Take 1,000 mg by mouth Every 6 (Six) Hours As Needed for Mild Pain ., Disp: , Rfl:   •  albuterol sulfate  (90 Base) MCG/ACT inhaler, Inhale 2 puffs Every 6 (Six) Hours As Needed for Wheezing., Disp: 6.7 g, Rfl: 0  •  atorvastatin (LIPITOR) 20 MG tablet, Take 20 mg by mouth every night at bedtime., Disp: , Rfl: 3  •  cyanocobalamin 1000 MCG/ML injection, Inject 1,000 mcg into the appropriate muscle as directed by prescriber Every 28 (Twenty-Eight) Days., Disp: , Rfl:   •  famotidine (PEPCID) 40 MG tablet, TAKE 1 TABLET BY MOUTH EVERY MORNING BEFORE BREAKFAST, Disp: 90 tablet, Rfl: 1  •  magnesium oxide (MAG-OX) 400 MG tablet, Take 400 mg by mouth 2 (Two) Times a Day., Disp: , Rfl:   •  ondansetron ODT (ZOFRAN-ODT) 8 MG disintegrating tablet, Take 8 mg by mouth Every 8 (Eight) Hours As Needed for Nausea or Vomiting., Disp: , Rfl:   •  oxyCODONE (ROXICODONE) 10 MG tablet, Take 1 tablet by mouth 3 (Three) Times a Day As Needed for Moderate Pain ., Disp: 90 tablet, Rfl: 0  •  potassium chloride (K-DUR,KLOR-CON) 20 MEQ CR tablet, Take 2 tablets by mouth Every Morning., Disp: 30 tablet, Rfl: 0  •  pregabalin (LYRICA) 100 MG capsule, Take 1 capsule by mouth 3 (Three) Times a Day., Disp: 90 capsule, Rfl: 2  •  sertraline (ZOLOFT) 50 MG tablet, TAKE 1 TABLET BY MOUTH EVERY EVENING BEFORE BED, Disp: 90 tablet, Rfl: 1  •  Syringe 23G X 1\" 3 ML misc, INJECT 1 ML B-12 ONCE MONTHLY, Disp: 1 each, Rfl: 3  •  temazepam (RESTORIL) 30 MG capsule, TAKE 1 CAPSULE BY MOUTH AT NIGHT AS NEEDED FOR SLEEP., Disp: 30 capsule, Rfl: 1  •  triamterene-hydrochlorothiazide (DYAZIDE) 37.5-25 MG per capsule, TAKE 1 CAPSULE BY MOUTH EVERY DAY, Disp: 90 capsule, Rfl: 1  •  warfarin (COUMADIN) 1 MG tablet, TAKE 1 TABLET BY MOUTH EVERY DAY AS DIRECTED, Disp: 45 tablet, Rfl: 2  •  warfarin (COUMADIN) 5 MG tablet, Active thrombosis  Indications: DVT/PE (active thrombosis), Disp: " 30 tablet, Rfl: 0    Allergies   Allergen Reactions   • Morphine Nausea Only and Hives   • Penicillin V Potassium Rash   • Sulfa Antibiotics Rash   • Levaquin [Levofloxacin] Nausea Only and Dizziness     When taken with Coumadin   • Amoxicillin Rash   • Norco [Hydrocodone-Acetaminophen] Rash       Family History   Problem Relation Age of Onset   • Hypertension Mother    • Cancer Father    • Hypertension Father    • Hypertension Sister    • Hypertension Brother    • Stroke Brother        Cancer-related family history includes Cancer in her father.    Social History     Tobacco Use   • Smoking status: Never Smoker   • Smokeless tobacco: Never Used   Substance Use Topics   • Alcohol use: No     Frequency: Never     Comment: caffeine 32-64oz qd   • Drug use: No       I have reviewed and confirmed the accuracy of the patient's history:  as entered by the MA/LPN/RN. Cindy Ball MD 05/04/20     SUBJECTIVE:    The patient is here for a follow up appointment. Doing well.  Patient continues to tolerate her chemotherapy fairly well.  She denies fevers, chills, nausea or vomiting.       REVIEW OF SYSTEMS:  Review of Systems   Constitutional: Negative for chills and fever.   HENT: Negative for ear pain, mouth sores, nosebleeds and sore throat.    Eyes: Negative for photophobia and visual disturbance.   Respiratory: Negative for wheezing and stridor.    Cardiovascular: Negative for chest pain and palpitations.   Gastrointestinal: Negative for abdominal pain, diarrhea, nausea and vomiting.   Endocrine: Negative for cold intolerance and heat intolerance.   Genitourinary: Negative for dysuria and hematuria.   Musculoskeletal: Negative for joint swelling and neck stiffness.   Skin: Negative for color change and rash.   Neurological: Negative for seizures and syncope.   Hematological: Negative for adenopathy.        No obvious bleeding   Psychiatric/Behavioral: Negative for agitation, confusion and hallucinations.        OBJECTIVE:    There were no vitals filed for this visit.    ECOG  (1) Restricted in physically strenuous activity, ambulatory and able to do work of light nature    Physical Exam   Constitutional: She is oriented to person, place, and time.   Neurological: She is alert and oriented to person, place, and time.   Psychiatric: She has a normal mood and affect. Her behavior is normal. Judgment and thought content normal.         RECENT LABS  WBC   Date Value Ref Range Status   05/01/2020 3.05 (L) 3.40 - 10.80 10*3/mm3 Final     RBC   Date Value Ref Range Status   05/01/2020 3.24 (L) 3.77 - 5.28 10*6/mm3 Final     Hemoglobin   Date Value Ref Range Status   05/01/2020 10.5 (L) 12.0 - 15.9 g/dL Final     Hematocrit   Date Value Ref Range Status   05/01/2020 33.1 (L) 34.0 - 46.6 % Final     MCV   Date Value Ref Range Status   05/01/2020 102.2 (H) 79.0 - 97.0 fL Final     MCH   Date Value Ref Range Status   05/01/2020 32.4 26.6 - 33.0 pg Final     MCHC   Date Value Ref Range Status   05/01/2020 31.7 31.5 - 35.7 g/dL Final     RDW   Date Value Ref Range Status   05/01/2020 15.9 (H) 12.3 - 15.4 % Final     RDW-SD   Date Value Ref Range Status   05/01/2020 57.7 (H) 37.0 - 54.0 fl Final     MPV   Date Value Ref Range Status   05/01/2020 9.6 6.0 - 12.0 fL Final     Platelets   Date Value Ref Range Status   05/01/2020 80 (L) 140 - 450 10*3/mm3 Final     Neutrophil %   Date Value Ref Range Status   05/01/2020 50.8 42.7 - 76.0 % Final     Lymphocyte %   Date Value Ref Range Status   05/01/2020 33.8 19.6 - 45.3 % Final     Monocyte %   Date Value Ref Range Status   05/01/2020 10.8 5.0 - 12.0 % Final     Eosinophil %   Date Value Ref Range Status   05/01/2020 4.3 0.3 - 6.2 % Final     Basophil %   Date Value Ref Range Status   05/01/2020 0.3 0.0 - 1.5 % Final     Neutrophils, Absolute   Date Value Ref Range Status   05/01/2020 1.55 (L) 1.70 - 7.00 10*3/mm3 Final     Lymphocytes, Absolute   Date Value Ref Range Status    05/01/2020 1.03 0.70 - 3.10 10*3/mm3 Final     Monocytes, Absolute   Date Value Ref Range Status   05/01/2020 0.33 0.10 - 0.90 10*3/mm3 Final     Eosinophils, Absolute   Date Value Ref Range Status   05/01/2020 0.13 0.00 - 0.40 10*3/mm3 Final     Basophils, Absolute   Date Value Ref Range Status   05/01/2020 0.01 0.00 - 0.20 10*3/mm3 Final     nRBC   Date Value Ref Range Status   12/11/2019 0.3 (H) 0.0 - 0.2 /100 WBC Final       Lab Results   Component Value Date    GLUCOSE 82 04/17/2020    BUN 15 04/17/2020    CREATININE 1.10 04/17/2020    EGFRIFNONA 57 (L) 04/17/2020    EGFRIFAFRI >60 06/07/2019    BCR 15.3 04/17/2020    K 4.6 04/17/2020    CO2 25.0 04/17/2020    CALCIUM 9.3 04/17/2020    ALBUMIN 3.70 04/17/2020    LABIL2 1.5 06/07/2019    AST 23 04/17/2020    ALT 12 04/17/2020         Assessment/Plan     There are no diagnoses linked to this encounter.    ASSESSMENT:    1. Recurrent ovarian cancer on carboplatinum, Doxil.  Patient had had carboplatinum Taxol, carbo Taxotere, Gemzar single agent, Niraparib and was on Doxil and carboplatinum.  Doxil has been discontinued due to approaching of lifetime dosing.  Refer to paradigm testing for future options at time of progression  2. Iron deficiency anemia: Intermittently on  iron  3. Pancytopenia: Due to chemotherapy: On Procrit  4. Hypomagnesemia: Continue to monitor levels  5. B12 deficiency on parenteral B12 replacement  6. Mucositis due to chemotherapy: resolved  7. Monitoring for chemotherapy toxicities    I reviewed her most recent CT scans done on 3/9/2020.  She has slightly enlarged mediastinal lymph nodes and left hilar node which may be reactive.  Patient also had fever for that reason she had COVID 19 testing done which was negative.  Right now she feels well and her CTs do not show any other signs of progressive disease in the abdomen and pelvis.  She has some calcified nodules which are not enlarging in the abdomen and pelvis.  So for now continue  single agent carboplatinum.  We will plan to rescan  again in 3 months.    PLANS:     Continue chemotherapy only with carboplatinum.  Her disease is stable   monthly: Ongoing  Check restaging CT scans of the chest, abdomen and pelvis due 6/3/2020   Continue magnesium oxide 400 mg three times a day and weekly IV replacement as needed.   Continue Coumadin  Continue b12 injections IM monthly. Transitioned injections to be done at the cancer center  Continue Retacrit 40,000 units subcu weekly for hemoglobin less than 10, For chemotherapy-induced anemia  Will need CT scans of the chest, abdomen and pelvis prior to the next visit  Magic mouthwash PRN for mucositis       I have reviewed labs results, imaging, vitals, and medications with the patient today. Will follow up in 1 month with me.  All questions answered to the best of my abilities    Patient verbalized understanding and is in agreement of the above plans.    I spent more than 25 minutes discussing with patient management recommendations, reviewing imaging studies, lab results, progress notes and formulating management decisions.  Time was also spent answering all his/ her questions to the best of my abilities.

## 2020-05-04 NOTE — TELEPHONE ENCOUNTER
I called and left a message to see if the patient would be able to sign on for her MyChart visit.  Requested that they call my direct line with any questions or concerns.

## 2020-05-08 ENCOUNTER — APPOINTMENT (OUTPATIENT)
Dept: ONCOLOGY | Facility: HOSPITAL | Age: 65
End: 2020-05-08

## 2020-05-08 ENCOUNTER — LAB (OUTPATIENT)
Dept: LAB | Facility: HOSPITAL | Age: 65
End: 2020-05-08

## 2020-05-08 ENCOUNTER — HOSPITAL ENCOUNTER (OUTPATIENT)
Dept: ONCOLOGY | Facility: HOSPITAL | Age: 65
Setting detail: INFUSION SERIES
End: 2020-05-08

## 2020-05-08 ENCOUNTER — TELEPHONE (OUTPATIENT)
Dept: ONCOLOGY | Facility: CLINIC | Age: 65
End: 2020-05-08

## 2020-05-08 ENCOUNTER — APPOINTMENT (OUTPATIENT)
Dept: LAB | Facility: HOSPITAL | Age: 65
End: 2020-05-08

## 2020-05-08 ENCOUNTER — HOSPITAL ENCOUNTER (OUTPATIENT)
Dept: ONCOLOGY | Facility: HOSPITAL | Age: 65
Setting detail: INFUSION SERIES
Discharge: HOME | End: 2020-05-08

## 2020-05-08 VITALS
SYSTOLIC BLOOD PRESSURE: 123 MMHG | HEART RATE: 59 BPM | BODY MASS INDEX: 30.16 KG/M2 | HEIGHT: 65 IN | OXYGEN SATURATION: 97 % | WEIGHT: 181 LBS | TEMPERATURE: 97.3 F | RESPIRATION RATE: 12 BRPM | DIASTOLIC BLOOD PRESSURE: 75 MMHG

## 2020-05-08 DIAGNOSIS — Z79.01 LONG TERM (CURRENT) USE OF ANTICOAGULANTS: ICD-10-CM

## 2020-05-08 DIAGNOSIS — E83.42 HYPOMAGNESEMIA: Primary | ICD-10-CM

## 2020-05-08 DIAGNOSIS — I82.722 CHRONIC DEEP VEIN THROMBOSIS (DVT) OF LEFT UPPER EXTREMITY, UNSPECIFIED VEIN (HCC): ICD-10-CM

## 2020-05-08 DIAGNOSIS — C56.1 MALIGNANT NEOPLASM OF RIGHT OVARY (HCC): ICD-10-CM

## 2020-05-08 LAB
BASOPHILS # BLD AUTO: 0.01 10*3/MM3 (ref 0–0.2)
BASOPHILS NFR BLD AUTO: 0.3 % (ref 0–1.5)
DEPRECATED RDW RBC AUTO: 58.4 FL (ref 37–54)
EOSINOPHIL # BLD AUTO: 0.07 10*3/MM3 (ref 0–0.4)
EOSINOPHIL NFR BLD AUTO: 1.8 % (ref 0.3–6.2)
ERYTHROCYTE [DISTWIDTH] IN BLOOD BY AUTOMATED COUNT: 16 % (ref 12.3–15.4)
HCT VFR BLD AUTO: 31.7 % (ref 34–46.6)
HGB BLD-MCNC: 10 G/DL (ref 12–15.9)
INR PPP: 1.7
LYMPHOCYTES # BLD AUTO: 1.27 10*3/MM3 (ref 0.7–3.1)
LYMPHOCYTES NFR BLD AUTO: 33.5 % (ref 19.6–45.3)
MAGNESIUM SERPL-MCNC: 1.4 MG/DL (ref 1.6–2.4)
MCH RBC QN AUTO: 32.1 PG (ref 26.6–33)
MCHC RBC AUTO-ENTMCNC: 31.5 G/DL (ref 31.5–35.7)
MCV RBC AUTO: 101.6 FL (ref 79–97)
MONOCYTES # BLD AUTO: 0.48 10*3/MM3 (ref 0.1–0.9)
MONOCYTES NFR BLD AUTO: 12.7 % (ref 5–12)
NEUTROPHILS # BLD AUTO: 1.96 10*3/MM3 (ref 1.7–7)
NEUTROPHILS NFR BLD AUTO: 51.7 % (ref 42.7–76)
PLATELET # BLD AUTO: 89 10*3/MM3 (ref 140–450)
PMV BLD AUTO: 12 FL (ref 6–12)
RBC # BLD AUTO: 3.12 10*6/MM3 (ref 3.77–5.28)
WBC NRBC COR # BLD: 3.79 10*3/MM3 (ref 3.4–10.8)

## 2020-05-08 PROCEDURE — 96365 THER/PROPH/DIAG IV INF INIT: CPT

## 2020-05-08 PROCEDURE — 25010000003 HEPARIN LOCK FLUSH PER 10 UNITS: Performed by: INTERNAL MEDICINE

## 2020-05-08 PROCEDURE — 83735 ASSAY OF MAGNESIUM: CPT | Performed by: NURSE PRACTITIONER

## 2020-05-08 PROCEDURE — 36591 DRAW BLOOD OFF VENOUS DEVICE: CPT

## 2020-05-08 PROCEDURE — 85610 PROTHROMBIN TIME: CPT

## 2020-05-08 PROCEDURE — 85025 COMPLETE CBC W/AUTO DIFF WBC: CPT | Performed by: NURSE PRACTITIONER

## 2020-05-08 PROCEDURE — 25010000002 MAGNESIUM SULFATE 2 GM/50ML SOLUTION: Performed by: NURSE PRACTITIONER

## 2020-05-08 RX ORDER — HEPARIN SODIUM (PORCINE) LOCK FLUSH IV SOLN 100 UNIT/ML 100 UNIT/ML
500 SOLUTION INTRAVENOUS AS NEEDED
Status: DISCONTINUED | OUTPATIENT
Start: 2020-05-08 | End: 2020-05-09 | Stop reason: HOSPADM

## 2020-05-08 RX ORDER — HEPARIN SODIUM (PORCINE) LOCK FLUSH IV SOLN 100 UNIT/ML 100 UNIT/ML
500 SOLUTION INTRAVENOUS AS NEEDED
Status: CANCELLED | OUTPATIENT
Start: 2020-05-08

## 2020-05-08 RX ORDER — SODIUM CHLORIDE 0.9 % (FLUSH) 0.9 %
10 SYRINGE (ML) INJECTION AS NEEDED
Status: DISCONTINUED | OUTPATIENT
Start: 2020-05-08 | End: 2020-05-09 | Stop reason: HOSPADM

## 2020-05-08 RX ORDER — SODIUM CHLORIDE 0.9 % (FLUSH) 0.9 %
10 SYRINGE (ML) INJECTION AS NEEDED
Status: CANCELLED | OUTPATIENT
Start: 2020-05-08

## 2020-05-08 RX ORDER — SODIUM CHLORIDE 9 MG/ML
250 INJECTION, SOLUTION INTRAVENOUS ONCE
Status: COMPLETED | OUTPATIENT
Start: 2020-05-08 | End: 2020-05-08

## 2020-05-08 RX ORDER — MAGNESIUM SULFATE HEPTAHYDRATE 40 MG/ML
2 INJECTION, SOLUTION INTRAVENOUS ONCE
Status: COMPLETED | OUTPATIENT
Start: 2020-05-08 | End: 2020-05-08

## 2020-05-08 RX ADMIN — Medication 10 ML: at 12:48

## 2020-05-08 RX ADMIN — HEPARIN 500 UNITS: 100 SYRINGE at 12:48

## 2020-05-08 RX ADMIN — MAGNESIUM SULFATE HEPTAHYDRATE 2 G: 40 INJECTION, SOLUTION INTRAVENOUS at 11:42

## 2020-05-08 RX ADMIN — SODIUM CHLORIDE 250 ML: 900 INJECTION, SOLUTION INTRAVENOUS at 11:42

## 2020-05-08 NOTE — PROGRESS NOTES
Patient is here for Mag and INR check. No complaints at this time. Patient was dosed last time to be taking 4.5 mg of coumadin. Patient states she was only taking 4 mg daily. With her INR of 1.70 I increased her to 5 mg daily per the guidelines. Patient to come in one week from today on 5/15 for INR recheck. Patient educated and verbalized understanding.

## 2020-05-08 NOTE — TELEPHONE ENCOUNTER
PT called in after missed appt to speak with a nurse and to see if she is able to come in later today for her missed appt.     Best call back number: [phone]

## 2020-05-08 NOTE — PATIENT INSTRUCTIONS
Begin taking 5mg of coumadin daily. We will recheck INR next week on 5/15.  So you may take 1 4mg tab and 1 1mg tab daily.

## 2020-05-15 ENCOUNTER — HOSPITAL ENCOUNTER (OUTPATIENT)
Dept: ONCOLOGY | Facility: HOSPITAL | Age: 65
Setting detail: INFUSION SERIES
Discharge: HOME | End: 2020-05-15

## 2020-05-15 ENCOUNTER — LAB (OUTPATIENT)
Dept: LAB | Facility: HOSPITAL | Age: 65
End: 2020-05-15

## 2020-05-15 ENCOUNTER — HOSPITAL ENCOUNTER (OUTPATIENT)
Dept: ONCOLOGY | Facility: HOSPITAL | Age: 65
Discharge: HOME | End: 2020-05-15
Admitting: NURSE PRACTITIONER

## 2020-05-15 VITALS
BODY MASS INDEX: 30.32 KG/M2 | WEIGHT: 182 LBS | HEIGHT: 65 IN | SYSTOLIC BLOOD PRESSURE: 135 MMHG | OXYGEN SATURATION: 96 % | HEART RATE: 58 BPM | RESPIRATION RATE: 14 BRPM | DIASTOLIC BLOOD PRESSURE: 82 MMHG | TEMPERATURE: 98.2 F

## 2020-05-15 VITALS — TEMPERATURE: 98.2 F

## 2020-05-15 DIAGNOSIS — Z79.01 LONG TERM (CURRENT) USE OF ANTICOAGULANTS: Primary | ICD-10-CM

## 2020-05-15 DIAGNOSIS — I82.722 CHRONIC DEEP VEIN THROMBOSIS (DVT) OF LEFT UPPER EXTREMITY, UNSPECIFIED VEIN (HCC): ICD-10-CM

## 2020-05-15 DIAGNOSIS — C56.1 MALIGNANT NEOPLASM OF RIGHT OVARY (HCC): ICD-10-CM

## 2020-05-15 DIAGNOSIS — E83.42 HYPOMAGNESEMIA: ICD-10-CM

## 2020-05-15 DIAGNOSIS — C56.1 MALIGNANT NEOPLASM OF RIGHT OVARY (HCC): Primary | ICD-10-CM

## 2020-05-15 LAB
ALBUMIN SERPL-MCNC: 3.8 G/DL (ref 3.5–5.2)
ALBUMIN/GLOB SERPL: 1.4 G/DL
ALP SERPL-CCNC: 130 U/L (ref 39–117)
ALT SERPL W P-5'-P-CCNC: 9 U/L (ref 1–33)
ANION GAP SERPL CALCULATED.3IONS-SCNC: 9 MMOL/L (ref 5–15)
AST SERPL-CCNC: 22 U/L (ref 1–32)
BASOPHILS # BLD AUTO: 0.01 10*3/MM3 (ref 0–0.2)
BASOPHILS NFR BLD AUTO: 0.3 % (ref 0–1.5)
BILIRUB SERPL-MCNC: 0.2 MG/DL (ref 0.2–1.2)
BUN BLD-MCNC: 13 MG/DL (ref 8–23)
BUN/CREAT SERPL: 16 (ref 7–25)
CALCIUM SPEC-SCNC: 9.7 MG/DL (ref 8.6–10.5)
CHLORIDE SERPL-SCNC: 105 MMOL/L (ref 98–107)
CO2 SERPL-SCNC: 28 MMOL/L (ref 22–29)
CREAT BLD-MCNC: 0.81 MG/DL (ref 0.57–1)
CREAT BLDA-MCNC: 0.9 MG/DL (ref 0.6–1.3)
DEPRECATED RDW RBC AUTO: 56.9 FL (ref 37–54)
EOSINOPHIL # BLD AUTO: 0.11 10*3/MM3 (ref 0–0.4)
EOSINOPHIL NFR BLD AUTO: 3.4 % (ref 0.3–6.2)
ERYTHROCYTE [DISTWIDTH] IN BLOOD BY AUTOMATED COUNT: 15.8 % (ref 12.3–15.4)
GFR SERPL CREATININE-BSD FRML MDRD: 71 ML/MIN/1.73
GLOBULIN UR ELPH-MCNC: 2.8 GM/DL
GLUCOSE BLD-MCNC: 77 MG/DL (ref 65–99)
HCT VFR BLD AUTO: 32.9 % (ref 34–46.6)
HGB BLD-MCNC: 10.5 G/DL (ref 12–15.9)
INR PPP: 1.6
LYMPHOCYTES # BLD AUTO: 1.19 10*3/MM3 (ref 0.7–3.1)
LYMPHOCYTES NFR BLD AUTO: 36.3 % (ref 19.6–45.3)
MAGNESIUM SERPL-MCNC: 1.5 MG/DL (ref 1.6–2.4)
MCH RBC QN AUTO: 32.3 PG (ref 26.6–33)
MCHC RBC AUTO-ENTMCNC: 31.9 G/DL (ref 31.5–35.7)
MCV RBC AUTO: 101.2 FL (ref 79–97)
MONOCYTES # BLD AUTO: 0.44 10*3/MM3 (ref 0.1–0.9)
MONOCYTES NFR BLD AUTO: 13.4 % (ref 5–12)
NEUTROPHILS # BLD AUTO: 1.53 10*3/MM3 (ref 1.7–7)
NEUTROPHILS NFR BLD AUTO: 46.6 % (ref 42.7–76)
PLATELET # BLD AUTO: 130 10*3/MM3 (ref 140–450)
PMV BLD AUTO: 11 FL (ref 6–12)
POTASSIUM BLD-SCNC: 4.5 MMOL/L (ref 3.5–5.2)
PROT SERPL-MCNC: 6.6 G/DL (ref 6–8.5)
RBC # BLD AUTO: 3.25 10*6/MM3 (ref 3.77–5.28)
SODIUM BLD-SCNC: 142 MMOL/L (ref 136–145)
WBC NRBC COR # BLD: 3.28 10*3/MM3 (ref 3.4–10.8)

## 2020-05-15 PROCEDURE — 25010000002 PALONOSETRON 0.25 MG/5ML SOLUTION PREFILLED SYRINGE: Performed by: NURSE PRACTITIONER

## 2020-05-15 PROCEDURE — 25010000003 HEPARIN LOCK FLUSH PER 10 UNITS: Performed by: INTERNAL MEDICINE

## 2020-05-15 PROCEDURE — 85610 PROTHROMBIN TIME: CPT

## 2020-05-15 PROCEDURE — 96413 CHEMO IV INFUSION 1 HR: CPT

## 2020-05-15 PROCEDURE — 85025 COMPLETE CBC W/AUTO DIFF WBC: CPT | Performed by: NURSE PRACTITIONER

## 2020-05-15 PROCEDURE — 36591 DRAW BLOOD OFF VENOUS DEVICE: CPT

## 2020-05-15 PROCEDURE — 83735 ASSAY OF MAGNESIUM: CPT | Performed by: NURSE PRACTITIONER

## 2020-05-15 PROCEDURE — 80053 COMPREHEN METABOLIC PANEL: CPT | Performed by: NURSE PRACTITIONER

## 2020-05-15 PROCEDURE — 25010000002 CARBOPLATIN PER 50 MG: Performed by: NURSE PRACTITIONER

## 2020-05-15 PROCEDURE — 25010000002 MAGNESIUM SULFATE 2 GM/50ML SOLUTION: Performed by: NURSE PRACTITIONER

## 2020-05-15 PROCEDURE — 25010000002 DEXAMETHASONE SODIUM PHOSPHATE 120 MG/30ML SOLUTION: Performed by: NURSE PRACTITIONER

## 2020-05-15 PROCEDURE — 96375 TX/PRO/DX INJ NEW DRUG ADDON: CPT

## 2020-05-15 PROCEDURE — 96367 TX/PROPH/DG ADDL SEQ IV INF: CPT

## 2020-05-15 PROCEDURE — 25010000002 FOSAPREPITANT PER 1 MG: Performed by: NURSE PRACTITIONER

## 2020-05-15 PROCEDURE — 36416 COLLJ CAPILLARY BLOOD SPEC: CPT

## 2020-05-15 PROCEDURE — 82565 ASSAY OF CREATININE: CPT

## 2020-05-15 RX ORDER — PALONOSETRON 0.05 MG/ML
0.25 INJECTION, SOLUTION INTRAVENOUS ONCE
Status: CANCELLED | OUTPATIENT
Start: 2020-05-15

## 2020-05-15 RX ORDER — DEXTROSE MONOHYDRATE 50 MG/ML
250 INJECTION, SOLUTION INTRAVENOUS ONCE
Status: CANCELLED | OUTPATIENT
Start: 2020-05-15

## 2020-05-15 RX ORDER — DEXTROSE MONOHYDRATE 50 MG/ML
250 INJECTION, SOLUTION INTRAVENOUS ONCE
Status: DISCONTINUED | OUTPATIENT
Start: 2020-05-15 | End: 2020-05-16 | Stop reason: HOSPADM

## 2020-05-15 RX ORDER — HEPARIN SODIUM (PORCINE) LOCK FLUSH IV SOLN 100 UNIT/ML 100 UNIT/ML
500 SOLUTION INTRAVENOUS AS NEEDED
Status: DISCONTINUED | OUTPATIENT
Start: 2020-05-15 | End: 2020-05-16 | Stop reason: HOSPADM

## 2020-05-15 RX ORDER — FAMOTIDINE 10 MG/ML
20 INJECTION, SOLUTION INTRAVENOUS AS NEEDED
Status: CANCELLED | OUTPATIENT
Start: 2020-05-15

## 2020-05-15 RX ORDER — SODIUM CHLORIDE 0.9 % (FLUSH) 0.9 %
10 SYRINGE (ML) INJECTION AS NEEDED
Status: DISCONTINUED | OUTPATIENT
Start: 2020-05-15 | End: 2020-05-16 | Stop reason: HOSPADM

## 2020-05-15 RX ORDER — PALONOSETRON 0.05 MG/ML
0.25 INJECTION, SOLUTION INTRAVENOUS ONCE
Status: COMPLETED | OUTPATIENT
Start: 2020-05-15 | End: 2020-05-15

## 2020-05-15 RX ORDER — DIPHENHYDRAMINE HYDROCHLORIDE 50 MG/ML
50 INJECTION INTRAMUSCULAR; INTRAVENOUS AS NEEDED
Status: CANCELLED | OUTPATIENT
Start: 2020-05-15

## 2020-05-15 RX ORDER — SODIUM CHLORIDE 9 MG/ML
250 INJECTION, SOLUTION INTRAVENOUS ONCE
Status: COMPLETED | OUTPATIENT
Start: 2020-05-15 | End: 2020-05-15

## 2020-05-15 RX ORDER — HEPARIN SODIUM (PORCINE) LOCK FLUSH IV SOLN 100 UNIT/ML 100 UNIT/ML
500 SOLUTION INTRAVENOUS AS NEEDED
Status: CANCELLED | OUTPATIENT
Start: 2020-05-15

## 2020-05-15 RX ORDER — MAGNESIUM SULFATE HEPTAHYDRATE 40 MG/ML
2 INJECTION, SOLUTION INTRAVENOUS ONCE
Status: COMPLETED | OUTPATIENT
Start: 2020-05-15 | End: 2020-05-15

## 2020-05-15 RX ORDER — SODIUM CHLORIDE 0.9 % (FLUSH) 0.9 %
10 SYRINGE (ML) INJECTION AS NEEDED
Status: CANCELLED | OUTPATIENT
Start: 2020-05-15

## 2020-05-15 RX ADMIN — PALONOSETRON 0.25 MG: 0.25 INJECTION, SOLUTION INTRAVENOUS at 10:28

## 2020-05-15 RX ADMIN — SODIUM CHLORIDE 100 ML: 900 INJECTION, SOLUTION INTRAVENOUS at 10:28

## 2020-05-15 RX ADMIN — HEPARIN 500 UNITS: 100 SYRINGE at 11:56

## 2020-05-15 RX ADMIN — DEXAMETHASONE SODIUM PHOSPHATE 8 MG: 4 INJECTION, SOLUTION INTRA-ARTICULAR; INTRALESIONAL; INTRAMUSCULAR; INTRAVENOUS; SOFT TISSUE at 11:01

## 2020-05-15 RX ADMIN — MAGNESIUM SULFATE HEPTAHYDRATE 2 G: 40 INJECTION, SOLUTION INTRAVENOUS at 09:38

## 2020-05-15 RX ADMIN — Medication 10 ML: at 11:56

## 2020-05-15 RX ADMIN — SODIUM CHLORIDE 540 MG: 900 INJECTION, SOLUTION INTRAVENOUS at 11:23

## 2020-05-15 RX ADMIN — SODIUM CHLORIDE 250 ML: 900 INJECTION, SOLUTION INTRAVENOUS at 09:37

## 2020-05-22 ENCOUNTER — LAB (OUTPATIENT)
Dept: LAB | Facility: HOSPITAL | Age: 65
End: 2020-05-22

## 2020-05-22 ENCOUNTER — HOSPITAL ENCOUNTER (OUTPATIENT)
Dept: ONCOLOGY | Facility: HOSPITAL | Age: 65
Setting detail: INFUSION SERIES
Discharge: HOME | End: 2020-05-22
Admitting: NURSE PRACTITIONER

## 2020-05-22 ENCOUNTER — HOSPITAL ENCOUNTER (OUTPATIENT)
Dept: ONCOLOGY | Facility: HOSPITAL | Age: 65
Discharge: HOME | End: 2020-05-22
Admitting: NURSE PRACTITIONER

## 2020-05-22 VITALS
DIASTOLIC BLOOD PRESSURE: 74 MMHG | OXYGEN SATURATION: 98 % | HEART RATE: 53 BPM | WEIGHT: 180 LBS | BODY MASS INDEX: 29.99 KG/M2 | RESPIRATION RATE: 14 BRPM | SYSTOLIC BLOOD PRESSURE: 127 MMHG | TEMPERATURE: 98.4 F | HEIGHT: 65 IN

## 2020-05-22 DIAGNOSIS — C56.1 MALIGNANT NEOPLASM OF RIGHT OVARY (HCC): ICD-10-CM

## 2020-05-22 DIAGNOSIS — T45.1X5A ANTINEOPLASTIC CHEMOTHERAPY INDUCED ANEMIA: ICD-10-CM

## 2020-05-22 DIAGNOSIS — D64.81 ANTINEOPLASTIC CHEMOTHERAPY INDUCED ANEMIA: ICD-10-CM

## 2020-05-22 DIAGNOSIS — D61.810 PANCYTOPENIA DUE TO ANTINEOPLASTIC CHEMOTHERAPY (HCC): ICD-10-CM

## 2020-05-22 DIAGNOSIS — T45.1X5A PANCYTOPENIA DUE TO ANTINEOPLASTIC CHEMOTHERAPY (HCC): ICD-10-CM

## 2020-05-22 DIAGNOSIS — E83.42 HYPOMAGNESEMIA: Primary | ICD-10-CM

## 2020-05-22 DIAGNOSIS — Z79.01 LONG TERM (CURRENT) USE OF ANTICOAGULANTS: Primary | ICD-10-CM

## 2020-05-22 DIAGNOSIS — I82.722 CHRONIC DEEP VEIN THROMBOSIS (DVT) OF LEFT UPPER EXTREMITY, UNSPECIFIED VEIN (HCC): ICD-10-CM

## 2020-05-22 LAB
BASOPHILS # BLD AUTO: 0.01 10*3/MM3 (ref 0–0.2)
BASOPHILS NFR BLD AUTO: 0.3 % (ref 0–1.5)
DEPRECATED RDW RBC AUTO: 53.4 FL (ref 37–54)
EOSINOPHIL # BLD AUTO: 0.07 10*3/MM3 (ref 0–0.4)
EOSINOPHIL NFR BLD AUTO: 2.2 % (ref 0.3–6.2)
ERYTHROCYTE [DISTWIDTH] IN BLOOD BY AUTOMATED COUNT: 15 % (ref 12.3–15.4)
HCT VFR BLD AUTO: 32.3 % (ref 34–46.6)
HGB BLD-MCNC: 10.3 G/DL (ref 12–15.9)
INR PPP: 2.5
LYMPHOCYTES # BLD AUTO: 1.31 10*3/MM3 (ref 0.7–3.1)
LYMPHOCYTES NFR BLD AUTO: 40.3 % (ref 19.6–45.3)
MAGNESIUM SERPL-MCNC: 1.5 MG/DL (ref 1.6–2.4)
MCH RBC QN AUTO: 32 PG (ref 26.6–33)
MCHC RBC AUTO-ENTMCNC: 31.9 G/DL (ref 31.5–35.7)
MCV RBC AUTO: 100.3 FL (ref 79–97)
MONOCYTES # BLD AUTO: 0.45 10*3/MM3 (ref 0.1–0.9)
MONOCYTES NFR BLD AUTO: 13.8 % (ref 5–12)
NEUTROPHILS # BLD AUTO: 1.41 10*3/MM3 (ref 1.7–7)
NEUTROPHILS NFR BLD AUTO: 43.4 % (ref 42.7–76)
PLATELET # BLD AUTO: 120 10*3/MM3 (ref 140–450)
PMV BLD AUTO: 10.6 FL (ref 6–12)
RBC # BLD AUTO: 3.22 10*6/MM3 (ref 3.77–5.28)
WBC NRBC COR # BLD: 3.25 10*3/MM3 (ref 3.4–10.8)

## 2020-05-22 PROCEDURE — 85610 PROTHROMBIN TIME: CPT

## 2020-05-22 PROCEDURE — 36591 DRAW BLOOD OFF VENOUS DEVICE: CPT

## 2020-05-22 PROCEDURE — 85025 COMPLETE CBC W/AUTO DIFF WBC: CPT | Performed by: NURSE PRACTITIONER

## 2020-05-22 PROCEDURE — 25010000002 EPOETIN ALFA-EPBX 40000 UNIT/ML SOLUTION: Performed by: NURSE PRACTITIONER

## 2020-05-22 PROCEDURE — 96365 THER/PROPH/DIAG IV INF INIT: CPT

## 2020-05-22 PROCEDURE — 25010000003 HEPARIN LOCK FLUSH PER 10 UNITS: Performed by: INTERNAL MEDICINE

## 2020-05-22 PROCEDURE — 25010000002 MAGNESIUM SULFATE 2 GM/50ML SOLUTION: Performed by: NURSE PRACTITIONER

## 2020-05-22 PROCEDURE — 96372 THER/PROPH/DIAG INJ SC/IM: CPT

## 2020-05-22 PROCEDURE — 83735 ASSAY OF MAGNESIUM: CPT | Performed by: NURSE PRACTITIONER

## 2020-05-22 PROCEDURE — 36416 COLLJ CAPILLARY BLOOD SPEC: CPT

## 2020-05-22 RX ORDER — SODIUM CHLORIDE 0.9 % (FLUSH) 0.9 %
10 SYRINGE (ML) INJECTION AS NEEDED
Status: DISCONTINUED | OUTPATIENT
Start: 2020-05-22 | End: 2020-05-23 | Stop reason: HOSPADM

## 2020-05-22 RX ORDER — HEPARIN SODIUM (PORCINE) LOCK FLUSH IV SOLN 100 UNIT/ML 100 UNIT/ML
500 SOLUTION INTRAVENOUS AS NEEDED
Status: CANCELLED | OUTPATIENT
Start: 2020-05-22

## 2020-05-22 RX ORDER — SODIUM CHLORIDE 0.9 % (FLUSH) 0.9 %
10 SYRINGE (ML) INJECTION AS NEEDED
Status: CANCELLED | OUTPATIENT
Start: 2020-05-22

## 2020-05-22 RX ORDER — MAGNESIUM SULFATE HEPTAHYDRATE 40 MG/ML
2 INJECTION, SOLUTION INTRAVENOUS ONCE
Status: COMPLETED | OUTPATIENT
Start: 2020-05-22 | End: 2020-05-22

## 2020-05-22 RX ORDER — HEPARIN SODIUM (PORCINE) LOCK FLUSH IV SOLN 100 UNIT/ML 100 UNIT/ML
500 SOLUTION INTRAVENOUS AS NEEDED
Status: DISCONTINUED | OUTPATIENT
Start: 2020-05-22 | End: 2020-05-23 | Stop reason: HOSPADM

## 2020-05-22 RX ORDER — SODIUM CHLORIDE 9 MG/ML
250 INJECTION, SOLUTION INTRAVENOUS ONCE
Status: DISCONTINUED | OUTPATIENT
Start: 2020-05-22 | End: 2020-05-23 | Stop reason: HOSPADM

## 2020-05-22 RX ADMIN — EPOETIN ALFA-EPBX 40000 UNITS: 40000 INJECTION, SOLUTION INTRAVENOUS; SUBCUTANEOUS at 09:53

## 2020-05-22 RX ADMIN — HEPARIN 500 UNITS: 100 SYRINGE at 10:04

## 2020-05-22 RX ADMIN — MAGNESIUM SULFATE HEPTAHYDRATE 2 G: 40 INJECTION, SOLUTION INTRAVENOUS at 09:10

## 2020-05-22 RX ADMIN — Medication 10 ML: at 10:04

## 2020-05-26 ENCOUNTER — HOSPITAL ENCOUNTER (OUTPATIENT)
Dept: PET IMAGING | Facility: HOSPITAL | Age: 65
Discharge: HOME | End: 2020-05-26
Admitting: INTERNAL MEDICINE

## 2020-05-26 DIAGNOSIS — C56.1 MALIGNANT NEOPLASM OF RIGHT OVARY (HCC): ICD-10-CM

## 2020-05-26 PROCEDURE — 71260 CT THORAX DX C+: CPT

## 2020-05-26 PROCEDURE — 0 IOPAMIDOL PER 1 ML: Performed by: INTERNAL MEDICINE

## 2020-05-26 PROCEDURE — 74177 CT ABD & PELVIS W/CONTRAST: CPT

## 2020-05-26 RX ADMIN — IOPAMIDOL 100 ML: 755 INJECTION, SOLUTION INTRAVENOUS at 09:10

## 2020-05-29 ENCOUNTER — APPOINTMENT (OUTPATIENT)
Dept: LAB | Facility: HOSPITAL | Age: 65
End: 2020-05-29

## 2020-05-29 ENCOUNTER — HOSPITAL ENCOUNTER (OUTPATIENT)
Dept: ONCOLOGY | Facility: HOSPITAL | Age: 65
Setting detail: INFUSION SERIES
Discharge: HOME | End: 2020-05-29

## 2020-05-29 ENCOUNTER — HOSPITAL ENCOUNTER (OUTPATIENT)
Dept: ONCOLOGY | Facility: HOSPITAL | Age: 65
Discharge: HOME | End: 2020-05-29
Admitting: NURSE PRACTITIONER

## 2020-05-29 ENCOUNTER — TELEPHONE (OUTPATIENT)
Dept: PAIN MEDICINE | Facility: HOSPITAL | Age: 65
End: 2020-05-29

## 2020-05-29 VITALS
DIASTOLIC BLOOD PRESSURE: 76 MMHG | WEIGHT: 180 LBS | TEMPERATURE: 97.8 F | HEART RATE: 58 BPM | HEIGHT: 65 IN | RESPIRATION RATE: 20 BRPM | SYSTOLIC BLOOD PRESSURE: 117 MMHG | BODY MASS INDEX: 29.99 KG/M2

## 2020-05-29 DIAGNOSIS — Z79.01 LONG TERM (CURRENT) USE OF ANTICOAGULANTS: Primary | ICD-10-CM

## 2020-05-29 DIAGNOSIS — M79.7 FIBROMYALGIA: Primary | ICD-10-CM

## 2020-05-29 DIAGNOSIS — C56.1 MALIGNANT NEOPLASM OF RIGHT OVARY (HCC): ICD-10-CM

## 2020-05-29 DIAGNOSIS — I82.722 CHRONIC DEEP VEIN THROMBOSIS (DVT) OF LEFT UPPER EXTREMITY, UNSPECIFIED VEIN (HCC): Primary | ICD-10-CM

## 2020-05-29 DIAGNOSIS — D61.810 PANCYTOPENIA DUE TO ANTINEOPLASTIC CHEMOTHERAPY (HCC): ICD-10-CM

## 2020-05-29 DIAGNOSIS — M79.7 FIBROMYALGIA: ICD-10-CM

## 2020-05-29 DIAGNOSIS — D51.0 PERNICIOUS ANEMIA: ICD-10-CM

## 2020-05-29 DIAGNOSIS — T45.1X5A ANTINEOPLASTIC CHEMOTHERAPY INDUCED ANEMIA: ICD-10-CM

## 2020-05-29 DIAGNOSIS — E83.42 HYPOMAGNESEMIA: Primary | ICD-10-CM

## 2020-05-29 DIAGNOSIS — T45.1X5A PANCYTOPENIA DUE TO ANTINEOPLASTIC CHEMOTHERAPY (HCC): ICD-10-CM

## 2020-05-29 DIAGNOSIS — D64.81 ANTINEOPLASTIC CHEMOTHERAPY INDUCED ANEMIA: ICD-10-CM

## 2020-05-29 LAB
BASOPHILS # BLD AUTO: 0.01 10*3/MM3 (ref 0–0.2)
BASOPHILS NFR BLD AUTO: 0.3 % (ref 0–1.5)
DEPRECATED RDW RBC AUTO: 57.7 FL (ref 37–54)
EOSINOPHIL # BLD AUTO: 0.09 10*3/MM3 (ref 0–0.4)
EOSINOPHIL NFR BLD AUTO: 2.7 % (ref 0.3–6.2)
ERYTHROCYTE [DISTWIDTH] IN BLOOD BY AUTOMATED COUNT: 16.6 % (ref 12.3–15.4)
HCT VFR BLD AUTO: 33.9 % (ref 34–46.6)
HGB BLD-MCNC: 10.9 G/DL (ref 12–15.9)
INR PPP: 3.5
LYMPHOCYTES # BLD AUTO: 1.03 10*3/MM3 (ref 0.7–3.1)
LYMPHOCYTES NFR BLD AUTO: 30.8 % (ref 19.6–45.3)
MAGNESIUM SERPL-MCNC: 1.7 MG/DL (ref 1.6–2.4)
MCH RBC QN AUTO: 32.7 PG (ref 26.6–33)
MCHC RBC AUTO-ENTMCNC: 32.2 G/DL (ref 31.5–35.7)
MCV RBC AUTO: 101.8 FL (ref 79–97)
MONOCYTES # BLD AUTO: 0.48 10*3/MM3 (ref 0.1–0.9)
MONOCYTES NFR BLD AUTO: 14.4 % (ref 5–12)
NEUTROPHILS # BLD AUTO: 1.73 10*3/MM3 (ref 1.7–7)
NEUTROPHILS NFR BLD AUTO: 51.8 % (ref 42.7–76)
PLATELET # BLD AUTO: 90 10*3/MM3 (ref 140–450)
PMV BLD AUTO: 11 FL (ref 6–12)
RBC # BLD AUTO: 3.33 10*6/MM3 (ref 3.77–5.28)
WBC NRBC COR # BLD: 3.34 10*3/MM3 (ref 3.4–10.8)

## 2020-05-29 PROCEDURE — 25010000002 CYANOCOBALAMIN PER 1000 MCG: Performed by: INTERNAL MEDICINE

## 2020-05-29 PROCEDURE — 85610 PROTHROMBIN TIME: CPT

## 2020-05-29 PROCEDURE — 85025 COMPLETE CBC W/AUTO DIFF WBC: CPT | Performed by: INTERNAL MEDICINE

## 2020-05-29 PROCEDURE — 36591 DRAW BLOOD OFF VENOUS DEVICE: CPT

## 2020-05-29 PROCEDURE — 25010000003 HEPARIN LOCK FLUSH PER 10 UNITS: Performed by: INTERNAL MEDICINE

## 2020-05-29 PROCEDURE — 83735 ASSAY OF MAGNESIUM: CPT | Performed by: INTERNAL MEDICINE

## 2020-05-29 PROCEDURE — 25010000002 MAGNESIUM SULFATE 2 GM/50ML SOLUTION: Performed by: INTERNAL MEDICINE

## 2020-05-29 PROCEDURE — 96372 THER/PROPH/DIAG INJ SC/IM: CPT

## 2020-05-29 PROCEDURE — 96365 THER/PROPH/DIAG IV INF INIT: CPT

## 2020-05-29 PROCEDURE — 36416 COLLJ CAPILLARY BLOOD SPEC: CPT

## 2020-05-29 RX ORDER — SODIUM CHLORIDE 0.9 % (FLUSH) 0.9 %
10 SYRINGE (ML) INJECTION AS NEEDED
Status: CANCELLED | OUTPATIENT
Start: 2020-05-29

## 2020-05-29 RX ORDER — CYANOCOBALAMIN 1000 UG/ML
1000 INJECTION, SOLUTION INTRAMUSCULAR; SUBCUTANEOUS ONCE
Status: COMPLETED | OUTPATIENT
Start: 2020-05-29 | End: 2020-05-29

## 2020-05-29 RX ORDER — HEPARIN SODIUM (PORCINE) LOCK FLUSH IV SOLN 100 UNIT/ML 100 UNIT/ML
500 SOLUTION INTRAVENOUS AS NEEDED
Status: DISCONTINUED | OUTPATIENT
Start: 2020-05-29 | End: 2020-05-30 | Stop reason: HOSPADM

## 2020-05-29 RX ORDER — PREGABALIN 100 MG/1
100 CAPSULE ORAL 3 TIMES DAILY
Qty: 21 CAPSULE | Refills: 0 | Status: SHIPPED | OUTPATIENT
Start: 2020-05-29 | End: 2020-06-01 | Stop reason: SDUPTHER

## 2020-05-29 RX ORDER — HEPARIN SODIUM (PORCINE) LOCK FLUSH IV SOLN 100 UNIT/ML 100 UNIT/ML
500 SOLUTION INTRAVENOUS AS NEEDED
Status: CANCELLED | OUTPATIENT
Start: 2020-05-29

## 2020-05-29 RX ORDER — PREGABALIN 100 MG/1
100 CAPSULE ORAL 3 TIMES DAILY
Qty: 21 CAPSULE | Refills: 0 | Status: CANCELLED | OUTPATIENT
Start: 2020-05-29 | End: 2020-06-28

## 2020-05-29 RX ORDER — SODIUM CHLORIDE 9 MG/ML
250 INJECTION, SOLUTION INTRAVENOUS ONCE
Status: DISCONTINUED | OUTPATIENT
Start: 2020-05-29 | End: 2020-05-30 | Stop reason: HOSPADM

## 2020-05-29 RX ORDER — SODIUM CHLORIDE 0.9 % (FLUSH) 0.9 %
10 SYRINGE (ML) INJECTION AS NEEDED
Status: DISCONTINUED | OUTPATIENT
Start: 2020-05-29 | End: 2020-05-30 | Stop reason: HOSPADM

## 2020-05-29 RX ORDER — MAGNESIUM SULFATE HEPTAHYDRATE 40 MG/ML
2 INJECTION, SOLUTION INTRAVENOUS ONCE
Status: COMPLETED | OUTPATIENT
Start: 2020-05-29 | End: 2020-05-29

## 2020-05-29 RX ADMIN — HEPARIN 500 UNITS: 100 SYRINGE at 11:10

## 2020-05-29 RX ADMIN — Medication 10 ML: at 11:10

## 2020-05-29 RX ADMIN — MAGNESIUM SULFATE HEPTAHYDRATE 2 G: 40 INJECTION, SOLUTION INTRAVENOUS at 10:03

## 2020-05-29 RX ADMIN — CYANOCOBALAMIN 1000 MCG: 1000 INJECTION, SOLUTION INTRAMUSCULAR at 11:11

## 2020-05-29 NOTE — TELEPHONE ENCOUNTER
Patient missed place her Lyrica pills and is worried about side effects. She is a Dr Ram patient and was wandering if you can send her enough lyrica to last until Monday? Will print inspect.

## 2020-05-29 NOTE — PROGRESS NOTES
Patient was here for IV Magnesium today. She has developed a rash on her face since her last cycle of chemo. It does itch and she said it sometimes blisters. She has used some vitamin E ointment on it. I let Dr. Ball know about it and she sees her on Wednesday June 3rd and said she will address it then. I instructed the patient to call if it gets any worse.

## 2020-06-01 RX ORDER — PREGABALIN 100 MG/1
100 CAPSULE ORAL 3 TIMES DAILY
Qty: 90 CAPSULE | Refills: 1 | Status: SHIPPED | OUTPATIENT
Start: 2020-06-01 | End: 2020-07-09 | Stop reason: SDUPTHER

## 2020-06-01 RX ORDER — MAGNESIUM OXIDE 400 MG/1
TABLET ORAL
Qty: 180 TABLET | Refills: 1 | Status: SHIPPED | OUTPATIENT
Start: 2020-06-01 | End: 2021-01-01 | Stop reason: SDUPTHER

## 2020-06-02 ENCOUNTER — TELEPHONE (OUTPATIENT)
Dept: ONCOLOGY | Facility: CLINIC | Age: 65
End: 2020-06-02

## 2020-06-02 NOTE — TELEPHONE ENCOUNTER
Patient has an appt tomorrow at 3 with dr. Ball. Patient does not want to do the video visit. Patient wants to come in and see dr. Ball. Patient has a rash on her face.      Call patient back at [phone]

## 2020-06-05 ENCOUNTER — HOSPITAL ENCOUNTER (OUTPATIENT)
Dept: ONCOLOGY | Facility: HOSPITAL | Age: 65
Discharge: HOME | End: 2020-06-05
Admitting: NURSE PRACTITIONER

## 2020-06-05 ENCOUNTER — LAB (OUTPATIENT)
Dept: LAB | Facility: HOSPITAL | Age: 65
End: 2020-06-05

## 2020-06-05 ENCOUNTER — HOSPITAL ENCOUNTER (OUTPATIENT)
Dept: ONCOLOGY | Facility: HOSPITAL | Age: 65
Setting detail: INFUSION SERIES
Discharge: HOME | End: 2020-06-05

## 2020-06-05 VITALS
TEMPERATURE: 98.2 F | HEART RATE: 59 BPM | BODY MASS INDEX: 29.99 KG/M2 | WEIGHT: 180 LBS | RESPIRATION RATE: 16 BRPM | SYSTOLIC BLOOD PRESSURE: 104 MMHG | DIASTOLIC BLOOD PRESSURE: 69 MMHG

## 2020-06-05 DIAGNOSIS — D61.810 PANCYTOPENIA DUE TO ANTINEOPLASTIC CHEMOTHERAPY (HCC): ICD-10-CM

## 2020-06-05 DIAGNOSIS — T45.1X5A ANTINEOPLASTIC CHEMOTHERAPY INDUCED ANEMIA: ICD-10-CM

## 2020-06-05 DIAGNOSIS — E83.42 HYPOMAGNESEMIA: Primary | ICD-10-CM

## 2020-06-05 DIAGNOSIS — D64.81 ANTINEOPLASTIC CHEMOTHERAPY INDUCED ANEMIA: ICD-10-CM

## 2020-06-05 DIAGNOSIS — C56.1 MALIGNANT NEOPLASM OF RIGHT OVARY (HCC): ICD-10-CM

## 2020-06-05 DIAGNOSIS — T45.1X5A PANCYTOPENIA DUE TO ANTINEOPLASTIC CHEMOTHERAPY (HCC): ICD-10-CM

## 2020-06-05 DIAGNOSIS — I82.722 CHRONIC DEEP VEIN THROMBOSIS (DVT) OF LEFT UPPER EXTREMITY, UNSPECIFIED VEIN (HCC): ICD-10-CM

## 2020-06-05 DIAGNOSIS — Z79.01 LONG TERM (CURRENT) USE OF ANTICOAGULANTS: Primary | ICD-10-CM

## 2020-06-05 LAB
BASOPHILS # BLD AUTO: 0.01 10*3/MM3 (ref 0–0.2)
BASOPHILS NFR BLD AUTO: 0.4 % (ref 0–1.5)
DEPRECATED RDW RBC AUTO: 60.2 FL (ref 37–54)
EOSINOPHIL # BLD AUTO: 0.09 10*3/MM3 (ref 0–0.4)
EOSINOPHIL NFR BLD AUTO: 3.2 % (ref 0.3–6.2)
ERYTHROCYTE [DISTWIDTH] IN BLOOD BY AUTOMATED COUNT: 16.8 % (ref 12.3–15.4)
FERRITIN SERPL-MCNC: 377 NG/ML (ref 13–150)
HCT VFR BLD AUTO: 33.9 % (ref 34–46.6)
HGB BLD-MCNC: 10.8 G/DL (ref 12–15.9)
INR PPP: 1.8
IRON 24H UR-MRATE: 66 MCG/DL (ref 37–145)
IRON SATN MFR SERPL: 19 % (ref 20–50)
LYMPHOCYTES # BLD AUTO: 1.05 10*3/MM3 (ref 0.7–3.1)
LYMPHOCYTES NFR BLD AUTO: 37.4 % (ref 19.6–45.3)
MAGNESIUM SERPL-MCNC: 1.6 MG/DL (ref 1.6–2.4)
MCH RBC QN AUTO: 32.3 PG (ref 26.6–33)
MCHC RBC AUTO-ENTMCNC: 31.9 G/DL (ref 31.5–35.7)
MCV RBC AUTO: 101.5 FL (ref 79–97)
MONOCYTES # BLD AUTO: 0.45 10*3/MM3 (ref 0.1–0.9)
MONOCYTES NFR BLD AUTO: 16 % (ref 5–12)
NEUTROPHILS # BLD AUTO: 1.21 10*3/MM3 (ref 1.7–7)
NEUTROPHILS NFR BLD AUTO: 43 % (ref 42.7–76)
PLATELET # BLD AUTO: 100 10*3/MM3 (ref 140–450)
PMV BLD AUTO: 10.9 FL (ref 6–12)
RBC # BLD AUTO: 3.34 10*6/MM3 (ref 3.77–5.28)
TIBC SERPL-MCNC: 344 MCG/DL (ref 298–536)
TRANSFERRIN SERPL-MCNC: 231 MG/DL (ref 200–360)
WBC NRBC COR # BLD: 2.81 10*3/MM3 (ref 3.4–10.8)

## 2020-06-05 PROCEDURE — 85610 PROTHROMBIN TIME: CPT

## 2020-06-05 PROCEDURE — 85025 COMPLETE CBC W/AUTO DIFF WBC: CPT | Performed by: INTERNAL MEDICINE

## 2020-06-05 PROCEDURE — 83540 ASSAY OF IRON: CPT | Performed by: INTERNAL MEDICINE

## 2020-06-05 PROCEDURE — 84466 ASSAY OF TRANSFERRIN: CPT | Performed by: INTERNAL MEDICINE

## 2020-06-05 PROCEDURE — 82728 ASSAY OF FERRITIN: CPT | Performed by: INTERNAL MEDICINE

## 2020-06-05 PROCEDURE — 25010000003 HEPARIN LOCK FLUSH PER 10 UNITS: Performed by: INTERNAL MEDICINE

## 2020-06-05 PROCEDURE — 96365 THER/PROPH/DIAG IV INF INIT: CPT

## 2020-06-05 PROCEDURE — 83735 ASSAY OF MAGNESIUM: CPT | Performed by: INTERNAL MEDICINE

## 2020-06-05 PROCEDURE — 36591 DRAW BLOOD OFF VENOUS DEVICE: CPT

## 2020-06-05 PROCEDURE — 36416 COLLJ CAPILLARY BLOOD SPEC: CPT

## 2020-06-05 PROCEDURE — 25010000002 MAGNESIUM SULFATE 2 GM/50ML SOLUTION: Performed by: INTERNAL MEDICINE

## 2020-06-05 RX ORDER — MAGNESIUM SULFATE HEPTAHYDRATE 40 MG/ML
2 INJECTION, SOLUTION INTRAVENOUS ONCE
Status: COMPLETED | OUTPATIENT
Start: 2020-06-05 | End: 2020-06-05

## 2020-06-05 RX ORDER — HEPARIN SODIUM (PORCINE) LOCK FLUSH IV SOLN 100 UNIT/ML 100 UNIT/ML
500 SOLUTION INTRAVENOUS AS NEEDED
Status: CANCELLED | OUTPATIENT
Start: 2020-06-05

## 2020-06-05 RX ORDER — HEPARIN SODIUM (PORCINE) LOCK FLUSH IV SOLN 100 UNIT/ML 100 UNIT/ML
500 SOLUTION INTRAVENOUS AS NEEDED
Status: DISCONTINUED | OUTPATIENT
Start: 2020-06-05 | End: 2020-06-06 | Stop reason: HOSPADM

## 2020-06-05 RX ORDER — SODIUM CHLORIDE 0.9 % (FLUSH) 0.9 %
10 SYRINGE (ML) INJECTION AS NEEDED
Status: CANCELLED | OUTPATIENT
Start: 2020-06-05

## 2020-06-05 RX ORDER — SODIUM CHLORIDE 9 MG/ML
250 INJECTION, SOLUTION INTRAVENOUS ONCE
Status: DISCONTINUED | OUTPATIENT
Start: 2020-06-05 | End: 2020-06-06 | Stop reason: HOSPADM

## 2020-06-05 RX ORDER — SODIUM CHLORIDE 0.9 % (FLUSH) 0.9 %
10 SYRINGE (ML) INJECTION AS NEEDED
Status: DISCONTINUED | OUTPATIENT
Start: 2020-06-05 | End: 2020-06-06 | Stop reason: HOSPADM

## 2020-06-05 RX ADMIN — Medication 10 ML: at 10:23

## 2020-06-05 RX ADMIN — HEPARIN 500 UNITS: 100 SYRINGE at 10:23

## 2020-06-05 RX ADMIN — MAGNESIUM SULFATE HEPTAHYDRATE 2 G: 40 INJECTION, SOLUTION INTRAVENOUS at 09:23

## 2020-06-05 NOTE — PROGRESS NOTES
Patient here for IV magnesium and missed her appt to see Dr. Ball this week. She has been having a rash on her face since her last chemo. It is slightly better from last Friday and not itching as bad. I informed Dr. Ball and she said she would see her early next week. I let Myra know to schedule her and she will have to make room for her on the schedule and call the patient with an appt time.

## 2020-06-11 ENCOUNTER — OFFICE VISIT (OUTPATIENT)
Dept: ONCOLOGY | Facility: CLINIC | Age: 65
End: 2020-06-11

## 2020-06-11 ENCOUNTER — APPOINTMENT (OUTPATIENT)
Dept: LAB | Facility: HOSPITAL | Age: 65
End: 2020-06-11

## 2020-06-11 VITALS
TEMPERATURE: 99.1 F | HEIGHT: 65 IN | HEART RATE: 58 BPM | BODY MASS INDEX: 30.16 KG/M2 | DIASTOLIC BLOOD PRESSURE: 77 MMHG | WEIGHT: 181 LBS | RESPIRATION RATE: 18 BRPM | SYSTOLIC BLOOD PRESSURE: 129 MMHG

## 2020-06-11 DIAGNOSIS — R21 RASH: Primary | ICD-10-CM

## 2020-06-11 DIAGNOSIS — C56.9 MALIGNANT NEOPLASM OF OVARY, UNSPECIFIED LATERALITY (HCC): ICD-10-CM

## 2020-06-11 PROCEDURE — 99215 OFFICE O/P EST HI 40 MIN: CPT | Performed by: INTERNAL MEDICINE

## 2020-06-11 RX ORDER — POTASSIUM CHLORIDE 1500 MG/1
TABLET, EXTENDED RELEASE ORAL
Qty: 150 TABLET | Refills: 5 | Status: SHIPPED | OUTPATIENT
Start: 2020-06-11 | End: 2020-06-19 | Stop reason: SDUPTHER

## 2020-06-11 RX ORDER — METHYLPREDNISOLONE 4 MG/1
TABLET ORAL
Qty: 1 EACH | Refills: 0 | Status: SHIPPED | OUTPATIENT
Start: 2020-06-11 | End: 2020-09-22

## 2020-06-11 NOTE — PROGRESS NOTES
Hematology/Oncology Outpatient Follow Up    PATIENT NAME:Tahira Zimmerman  :1955  MRN: [id number]  PRIMARY CARE PHYSICIAN: Noemy Queen MD  REFERRING PHYSICIAN: Noemy Queen MD    Chief Complaint   Patient presents with   • Follow-up     rash, malignant neoplasm of right ovary          HISTORY OF PRESENT ILLNESS:   1. Recurrent ovarian cancer diagnosis established in 2013.  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  The patient was treated with surgery and then postoperatively with adjuvant chemotherapy consisting of Carboplatin and Taxol.  Six months later, she had recurrence of disease intra-abdominally between the rectum and vagina, which was treated with intraabdominal peritoneal chemotherapy.  She had been free of disease since that time.  She reported feeling a mass in the left side of her abdomen approximately six months ago.  This gradually grew and in early 2013 she had her daughter feel the mass.  The daughter works for Dr. David Rogers and the patient at that time also complained of intermittent rectal bleeding.  Consultation with Dr. David Rogers was obtained.  The patient had a CT scan of the abdomen and pelvis performed on 13 revealing left lower quadrant mass measuring 9.7 x 9.3 x 6 cm demonstrating areas of dense calcification, soft tissue density and cystic density within it.  Stromal tumor is felt to be most likely a possibly fibroma.  It is generated with necrosis and calcification.  Possible palpable mass in the rectus muscle is seen with calcification and deformity of the rectus muscle and just below this a second mass intra-abdominally was seen measuring 2 cm in the greatest diameter suspicious for second intraabdominal tumor.  The mass separate from the largest mass measuring 2.6 cm in the dependent portion of pelvis is seen on the right of the midline.  No  retroperitoneal lymphadenopathy was seen.  No free air, free fluid or other abnormality was present.  The patient was scheduled for an outpatient colonoscopy, but had syncopal episode while sitting in the toilet the night before and was brought to the emergency room early in the morning by her daughter and son-in-law.  The patient had apparently stopped breathing for a few seconds and did not have a pulse, but started breathing before CPR could be initiated.  In the emergency room, the patient had an EKG revealing sinus rhythm nonspecific T-wave flattening; metabolic panel revealed a glucose of 148, creatinine of 1.3, CBC was normal.  She was treated with intravenous fluid.  The patient’s case was then discussed with Dr. Anabell Monique and patient was subsequently admitted to [facility].  The patient underwent a colonoscopy by Dr. David Rogers on 06/27/13 revealing sigmoid diverticulosis and grade II internal and external hemorrhoids, but no mass or colitis was seen.  CT-guided biopsy of the mass was performed on 06/27/13 as well revealing metastatic papillary adenocarcinoma with numerous psammoma bodies.  Pathology comment stated prior records were not available; however, with history of ovarian cancer the current biopsy would be consistent with metastases from that malignancy.  Oncology consult was obtained and I initially saw the patient on 06/27/2013 where the patient had claimed to have little bit of pain in the area of disease.  She reported being frequently constipated, denies any weight loss or fevers.  She did report having some night sweats, but thought that was because of her menopause.  On 06/27/13 metastatic workup including labs and CT scans were initiated.  CT scan of the chest on 06/27/13 revealed no acute disease in the chest.  A 1.4 cm size focal area of decreased density was noted in the lateral aspect of the right lobe of the liver consistent with a benign cyst or hemangioma.   There was a very small hiatal hernia measuring 2.2 cm in diameter.  A CT scan of the head performed 06/27/13 revealed no acute intracranial abnormality noted.  CA-125 was 40 units/ml (0-35), CA 19-9 was 11.8 units/ml (0-35), CEA level was 0.8 ng/ml (0-3), TSH level was 1.09 IU/ml (0.34-5.6).  The patient was in workup for the syncopal episode, a carotid Doppler was performed on June 28 revealing an essentially normal study showing a reduction in diameter from 0-15% bilaterally.  A Holter monitor was completed on 06/27/13, which was unremarkable except for a few premature atrial contractions.  The patient was felt stable and subsequently was discharged home on 06/28/13.  Post discharge, the patient was seen at [facility] on 07/05/13 by Dr. Kathryn Thrasher who discussed treatment options with the patient including surgery.  The patient is in the office today 07/16/13 in follow up post hospitalization.  She is reporting that surgery is planned for July 30th of this month at .  · 7/30/13 to 8/3/13 - The patient was in [facility].  The patient was admitted for surgery for her recurrent ovarian cancer.  The patient had exploratory laparotomy, omentectomy, bowel resection, liver biopsy and appendectomy.  Pathology revealed of the omentum metastatic serous carcinoma of the transverse colon, segmental resection showed metastatic serous carcinoma involving mesenteric fat.  The liver wedge resection showed fibrosclerotic thickening of the hepatic capsule.  Benign liver parenchyma.  No evidence of metastatic tumor.  Appendix showed fibrous obliteration of the lumen.  Negative for metastatic carcinoma.  Rectum and sigmoid colon segmental resection showed metastatic serous carcinoma invading the colorectal mucosa uninvolved by tumor.  Vaginal mass showed metastatic serous carcinoma.  Margins are positive for tumor.  · 9/24/13 - Chemotherapy orders were written for patient to start  carboplatin 550 mg IV day one and Taxol 330 mg IV day one to be cycled every three weeks.  · 10/10/13 - The patient started cycle #1 of chemotherapy consisting of Carboplatin and Taxol.  · 09/25/13 -  is 10.6 normal.  · 10/01/13 - CT of the chest, abdomen and pelvis revealed new reticular nodular occupancy in the anterior segment of the right upper lobe, could reflect an inflammation or infectious etiology or could reflect unusual persistence of metastatic tumor.  New liver lesions, the smaller one appears to be implant on the surface of the liver, the larger may also represent an implant including the intrahepatic.  Several small mesenteric nodes seen throughout the abdomen and pelvis.  · 10/02/13 - Port placed by Dr. Sen.  · 10/24/13 - Orders written to start Neupogen daily and if more than three Neupogen are needed to give Neulasta after next chemo.  ANC is 0.15.  · 10/31/14 - Patient given cycle 2 of chemotherapy with Taxol and Carboplatin.  · 11/21/13 - The patient started cycle 3 of chemotherapy consisting of Carboplatin and Taxol.  · 11/26/13 - CT scan of chest, abdomen and pelvis revealed no evidence of active disease in the chest.  Reticulonodular opacity has resolved.  Metastatic lesion impressing upon the hepatic dome similar to prior exam.  No evidence of a change.  No evidence of new disease.  Postsurgical changes in the pelvis and throughout the retroperitoneum in the large bowel.  Finding containing anterior abdominal wall hernia containing fat tissue and enhancing vessels, 3 cm in diameter.  · 12/2/13 - Orders written to start Neupogen per protocol as well as Aranesp due to low hemoglobin and ANC.  ANC is 0.14.  Hemoglobin is 9.7.  · 12/11/13 - Orders written to hold chemotherapy until next week and decrease dose by 20% due to low platelet count.  Platelet count is 85,000.  · 12/16/13 - The patient received cycle 4 of Carboplatin and Taxol at a 20% dose reduction so Taxol dose is 260 mg and  Carboplatin is 440 mg.  · 12/16/13 - CA-125 12.6 (N).  · 12/19/13 - PET/CT scan.  Impression:  1. There is no evidence of hypermetabolism in the liver.  Specifically of the dominant lesion in or adjacent to the right hepatic dome that was seen and described on the recent CT study of 11/26/2013.  It is photopenic relative to the surrounding liver.  2.  There is no evidence of hypermetabolic soft issue mass lesion or free fluid in the abdomen or pelvis.  3.  The tonsillar tissues are slightly enlarged and have moderate activity level.  This maybe physiologic.  Correlation with oral cavity, examination is recommended.  4.  Mild amount of activity in the right level II jugular lymph chain without focally enlarged lymph nodes is of questionable significance.  · 1/6/13 - The patient received cycle 5 of chemotherapy with Taxol and Carboplatin.  · 1/27/14 - The patient started on cycle 6 of Taxol and Carboplatin.  · 2/18/14 - CT of the abdomen and pelvis with contrast; stable lesions impressing upon the hepatic dome, unchanged compared to the prior exam.  Multiple anterior abdominal wall hernias re-demonstrated.  Those above the umbilicus contain omental fat.  Below and to the left of the umbilicus as anterior abdominal hernia containing omental fat in nondilated small bowel which is larger than seen previously.  · 2/20/14 - The patient received cycle 7 of chemotherapy with Taxol and Carboplatin.   · 3/11/14 - Order written to discontinue chemotherapy due to patient has completed 7 cycles of chemotherapy and CT scans suggest possible remission.  · 3/11/14 -  11.0 (N).  · 06/05/14 - CT scan of the chest abdomen and pelvis with contrast: There are multiple anterior abdominal wall hernias.  There are 2 larger anterior abdominal wall hernias at the level of the transverse colon, which contain omental fat.  There are at least 2 small anterior abdominal wall hernias that contain omental fat.  There is a moderate sized  anterior abdominal wall hernia at the level of the umbilicus, eccentric to the left, which contain non-dilated bowel.  Interval decrease in the size of two deposit impressing on the liver.  The third tiny deposit has been stable.  · 8/19/14 -  10.4 (N).  · 12/19/14 -   10.0 (N)  · 1/7/15 - CT chest, abdomen and pelvis with stable appearance of the chest with several micronodules. Small hiatal hernia, slightly larger than previous exam, increased from 1.5 to 2.5 cm in diameter. Bochdalek hernia unchanged. Multiple hepatic lesions. The two dominant lesions at the right hepatic dome had decreased in size from previous. The remaining tiny nodules measured 3-5 mm in diameter and were unchanged. There was no evidence of new intra-abdominal or pelvic mass or free fluid. There were multiple anterior abdominal wall hernias which had increased in size.   · 7/27/15 - Comprehensive metabolic panel with no significant abnormalities. CA-125 of 21 (0-35).   · 10/26/15 - WBC 6, hemoglobin 13, platelet count 204,000. Heart rate 47. CA-125 of 20 (0-35).    · 2/18/16 - CT chest with contrast with stable micronodular density seen in the lungs without significant change from prior exam. CT abdomen and pelvis with regression of liver lesions with no new evidence of metastasis. Multiple ventral hernias again noted without significant change from prior exam. Creatinine 0.9 (0.6-1.3).    · 2/25/16 - Patient hospitalized at [facility] between 2/25/16 and 2/27/16 with pyelonephritis secondary to E-coli. She was given two days of IV Rocephin and then placed on oral Levaquin. She was asked to hold her Coumadin, but then had nausea and vomiting because of Levaquin and stopped the Levaquin also. Her CA-125 was 32 (0-35) and CEA 1.3 (0-5). Her urine grew >100,000 E-coli. CT of the abdomen and pelvis was done which revealed 4.1 x 1.8 cm sized mild ovoid area of decreased density in the liver parenchyma along the anteromedial  aspect of the dome of the right lobe of the liver, unchanged significantly since the previous study of 2/18/16. There was suspicion of a very small pericardial effusion. There was suspicion of a small hiatal hernia. There were several hernias in the anterior abdominal wall. A 3.5 x 3.1 cm incised well-circumscribed abnormal density in the left retroperitoneal fatty soft tissue at the level of the left iliac crest was suggestive of tumor recurrence. There was an abnormal density in the left retroperitoneal soft tissues in the left flank that partially surrounded the left kidney and extended downward to the left pelvic area. Diverticulosis of the distal descending colon and sigmoid colon were seen.     · 3/8/16 - Patient prescribed Bactrim DS 1 p.o. b.i.d. for 10 days for pyelonephritis, which was partially treated with Rocephin and Levaquin. Urine with 40,000 E-coli treated with Macrobid for 7 days.  of 20 (0-35).    · 3/31/16 - PET scan with area of low density anteriorly in the right lobe of the liver with no significant radiopharmaceutical uptake to suggest malignancy. Multiple round soft tissue density masses showing increased radiopharmaceutical activity and multiple anterior abdominal wall hernias.   · 5/10/16 - Patient complains of venous enlargement of the left chest wall around the port. Has not been seen by  yet, but has an appointment on 5/23/16. Complained of a cough.   · 5/12/16 - Infusaport contrast study with no evidence of fibrin sheath. Chest x-ray with mild cardiac prominence.   · 5/23/16 - Patient seen in consultation by Sheng Bowman M.D. at [facility] and a chemotherapy trial recommended.   · 6/1/16 -   of 27.1 (0-35).    · 6/14/16 - WBC 5.71, hemoglobin 12.4, platelet count 188,000. Patient is scheduled for surgery at  on 6/16/16 after further discussion with  physicians. Discussed adjuvant chemotherapy.   · 6/16/16 - Excisional biopsy of abdominal wall mass  performed by Sheng Bowman M.D. at [facility] with pathology revealing metastatic high-grade papillary serous carcinoma.   · 7/7/16 - Received a call from the patient that Tramadol was not working and that she was given Norco #24 after her biopsy at  which did help her pain. Patient prescribed Norco 5/325 one p.o. q. 4-6 hours p.r.n. #60 with no refills. Echocardiogram with left ventricular inferior wall hypokinesis with ejection fraction of 50-55%.   · 7/8/16 - Patient seen in consultation by Sheng Bowman M.D. in consultation at  for metastatic high-grade papillary serous carcinoma diagnosed by excisional biopsy on 6/16/16 with carcinomatosis and patient not a surgical resection candidate. Genetic sequencing and susceptibility testing of tumor was ordered. Biopsy was done of anterior abdominal wall.   · 7/7/16 - Echocardiogram with left atrial enlargement. Mild mitral regurgitation. Left ventricular proximal inferior wall hypokinesis with ejection fraction of 50-55%.   · 7/11/16 - While waiting for genomics results, in order not to delay treatment further, order written for Taxotere 60 mg/M2 IV over one hour followed by Carboplatin AUC 5 over one hour, cycles to be repeated every three weeks.  Comprehensive metabolic panel normal.  26 (0-35).    · 725/16 - Pain contract signed for use of Norco.   · 8/5/16 - Patient stated that she experienced a red rash and was itchy post taking Norco. Norco discontinued and Tramadol refilled.   · 8/16/16 - Patient started cycle 1 chemotherapy. WBC 7.1, hemoglobin 11.2, MCV 88.7, platelet count 94,000. Patient complained of nausea for a week post chemo. Already getting Emend, Aloxi and Decadron. Added Omeprazole 40 mg daily orally.   · 8/17/16 - Urine culture with >100,000 E-coli.   · 8/25/16 - Cycle 2 chemotherapy given.   · 9/9/16 - Magnesium 1.3, replaced intravenously. Potassium 3.4 (3.6-5.1), increased oral potassium supplementation.    · 9/16/16 -  Cycle 3 chemotherapy given.   · 9/19/16 - Comprehensive metabolic panel with no significant abnormality.   · 9/20/16 - CT abdomen and pelvis with evidence of progressive metastatic disease in the abdomen and pelvis with interval enlargement of several soft tissue attenuations and subcutaneous nodules in the anterior abdominal wall. Interval enlargement of a left pelvic soft tissue attenuation mass. A lentiform area of low attenuation in the dome of the liver stable in size. Multiple anterior abdominal wall hernias containing fat and/or bowel. Marked pelvic floor laxity. CT chest negative for evidence of metastatic disease. Tiny pulmonary nodules in the right lung stable since 2013 consistent with a benign etiology.   · 9/23/16 - Macrobid 100 mg p.o. b.i.d. x7 days prescribed for UTI. Current chemotherapy discontinued after discussion and new chemotherapy orders written. Carboplatin AUC-5 IV day 1 and Doxil (Liposomal Doxorubicin) 30 mg/M2 IV day 1 to cycle q. 21 days.  of 18 (0-35). Magnesium 1.6, replaced intravenously. Comprehensive metabolic panel with potassium 3.4 (3.6-5.1).     · 10/13/16 - Patient started on cycle 1 of Carboplatin and Liposomal Doxorubicin followed by Neulasta.   · 11/3/16 - Cycle 2 Carbo and Doxil given.   · 11/17/16 - Magnesium 1.0, replaced intravenously. Oral potassium supplement increased.   · 11/29/16 - CT chest with small non-calcified right pulmonary nodules unchanged. Benign bone island in the midthoracic vertebral body along with benign bony hemangiomas in the middle thoracic vertebral bodies. CT abdomen and pelvis with mild progressive metastatic disease from CT of September 2016. Anterior abdominal wall mass superiorly mildly increased in size with new area noted as described measuring 3 x 1.7 cm. 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 Stable area of decreased uptake in the dome of the liver not hypermetabolic on recent PET  scan, likely representing cyst or focal fatty infiltration. Stable small hiatal hernia, fat-containing Bochdalek’s hernia and anterior abdominal wall hernias with stable pelvic floor relaxation.    · 12/1/16 - Cycle 3 chemotherapy given.   · 12/8/16 - Current chemotherapy discontinued and patient started on Gemcitabine 1000 mg/M2 IV days 1, 8, 15 q. 28 days.   · 12/22/16 - Patient seen in followup by Juliana Roy M.D. at the pain clinic, treated with Oxycodone and Lyrica. Transaminases normal. Patient started cycle 1 chemotherapy.   · 12/29/16 - Magnesium 1.1 (1.8-2.5), replaced intravenously.   · 1/5/17 - Cycle 1 day 15 chemotherapy held as patient leaving for vacation to [place]. Chemotherapy dose decreased by 20%. Patient complains of diarrhea for which Imodium prescribed.   · 1/11/17 - BRCA-1 and BRCA-2 negative.   · 1/12/17 - Echocardiogram with ejection fraction 60% or greater.   · 1/19/17 - Comprehensive metabolic panel with no significant abnormality. Patient started cycle 2 chemotherapy.   · 2/2/17 - Urine with >100,000 E-coli treated with Cipro 500 mg p.o. b.i.d. x1 week.   · 2/6/17 - Magnesium 1.1 (1.8-2.5), replaced intravenously.    · 2/23/17 - Patient started cycle 3 chemotherapy.   · 2/27/17 - CT chest with interval development of too numerous to count very small pulmonary nodules, ill-defined ground glass opacities concerning for development of pulmonary metastatic disease. Stable left-sided chest port. CT abdomen and pelvis with stable appearance of multiple small soft tissue densities within the abdomen near midline subcutaneous fat of the abdominal wall. Interval decrease in size of largely calcified left pelvic mass and multiple fat in bowel containing small ventral hernias.   · 3/6/17 - Pulmonary consultation obtained for lung nodules. Discussed CT results with patient. Decision made to continue present chemotherapy until further lung evaluation as abdominal disease better.  of 18  (0-35).    · 3/16/17 - Patient started cycle 4 chemotherapy, but treatment held from day 8 onwards because of hospitalization.   · 3/19/17 - Patient was hospitalized at [facility] between 3/19/17 and 3/25/17 with chest pain. Chest x-ray had revealed increased mild bibasilar airspace disease. Troponin I and EKG’s were negative. INR was elevated. Patient was treated with antibiotics. EGD was performed revealing small hiatal hernia and esophageal dilatation was performed. Bronchoscopy was performed also with no intrabronchial lesions identified. IgA was 51 (), IgG 320 (600-1500) and IgM 19 (). Lexiscan nuclear stress test had no evidence of reversible myocardial ischemia with normal left ventricular ejection fraction of 58%. CT chest had development of patchy bilateral ground glass opacities most pronounced involving the left upper lobe and bilateral lower lobes. Multiple tiny bilateral pulmonary nodules were less conspicuous. The patient underwent a bronchoscopy by Jerica Esparza M.D. with right upper lobe bronchoalveolar lavage revealing benign squamous cells and bronchial cells with pulmonary macrophages present. There was mild acute inflammation. Negative for malignant cells. Prilosec was changed to Famotidine for hypomagnesemia.    · 4/6/17 - Magnesium 1.2 (1.8-2.5). Comprehensive metabolic panel with no significant abnormality. Patient seen in follow-up by Fito Ram M.D. at [facility]. Treated with Lyrica and Oxycodone.    · 5/4/17 - Patient complains of a rash itching on her right upper arm. Eczematous rash noted and patient prescribed Cyclocort 0.1% b.i.d. Magnesium infusions continued with each chemo. Order written to resume chemotherapy.   · 5/16/17 - Cycle 5 chemotherapy started.   · 5/26/17 - Magnesium 1.4 (1.8-2.5), replaced intravenously. Potassium 2.9 (3.6-5.1), KCL increased to 20 mEq three in the morning and three in the evenings.   · 5/30/17 - PET scan with remaining metabolic  activity within the nodular areas. The suggested mass which is partially calcified and necrotic within the left aspect of the pelvis seems slightly larger with increased metabolic activity. There was more calcification in these areas compared to prior study, suggesting inflammation.      · 6/8/17 - Patient complaining of shortness of breath. Does take HCTZ at home. Chest x-ray ordered and chemotherapy continued along with weekly magnesium replacement. Chest x-ray with no acute cardiopulmonary abnormalities.   · 6/13/17 - Patient received cycle 6 of chemotherapy.   · 7/31/17 - WBC 5.0, hemoglobin 11.2, MCV 89.7, platelet count 217,000. Patient is to resume chemotherapy starting with cycle 7 on Wednesday of this week.  of 19 (<35). Potassium 3.3 (3.5-5.3).     · 8/2/17 - Patient began cycle 7 of chemotherapy.   · 8/30/17 - Patient started cycle 8 chemotherapy.   · 9/5/17 - Patient seen in followup at Pain Management by Fito Ram M.D. and continued on treatment with Oxycodone and Lyrica.   · 9/13/17 - Patient was hospitalized at [facility] for a day with dizziness secondary to dehydration, hypokalemia and anemia. She was given 1 unit of packed red blood cells and electrolytes were replaced. Chest x-ray revealed a subtle opacity in the left lateral lung base favored to represent atelectasis. Magnesium 1.3 (1.5-2.5), patient continued on replacement.    · 9/22/17 - Chemotherapy and magnesium replacement continued with decrease in chemotherapy dose by 10% secondary to cytopenias.   · 9/25/17 - Cycle 9 chemotherapy started.   · 10/12/17 - CT of the chest with contrast showed several tiny pulmonary nodules. One within the right lower lobe appears new from prior exams. Two adjacent foci of nodularity within the medial right lower lobe suggestive of minor infectious or inflammatory process. CT of the abdomen and pelvis with contrast which showed soft tissue nodules along the anterior abdominal and pelvic walls  unchanged from previous scan. Also a partially calcified mass within the left pelvis unchanged. No acute process identified within the abdomen or pelvis. Several left paracentral ventral hernias unchanged. No evidence of acute bowel obstruction.   · 10/16/17 - Order written for Levaquin 500 mg p.o. daily as the patient states that she has had a productive cough, fever and chills and with the results of the CT scan showing a possible infectious versus inflammatory process. Order also written to continue chemotherapy and magnesium replacement as previously prescribed.   · 10/23/17 - Cycle 10 chemotherapy started.   · 11/19/17 - Patient went to the Emergency Room at [place] [facility] complaining of right lower extremity redness and fever for a day. Venous Doppler had no evidence of a DVT and patient was discharged home on Clindamycin.   · 11/21/17 -  of 17 (0-35).   · 12/4/17 - Cycle 11 chemotherapy started.   · 12/20/17 - PET scan with multiple hypermetabolic soft tissue nodules abutting the anterior abdominal wall, stable from previous examination. Peripherally calcified left lower quadrant mass near the ascending colon stable in size demonstrating no hypermetabolic uptake. Previously had maximum SUV of 7. Right medial basilar pulmonary nodules resolved.   · 12/22/17 - CT left lower extremity with soft tissue swelling with skin thickening present along the anterior and medial aspect of the leg, slightly more pronounced around the palpable marker seen within the upper medial aspect of the lower extremity.   · 12/26/17 - Elastic stockings prescribed for lower extremity edema.   · 1/8/18 - Patient hospitalized at [facility] between 1/8/18 and 1/11/18 with fever of up to 101 degrees and painful rash on the lower extremities of several weeks’ duration. She was found to be hypokalemic and MRI of the lower extremities revealed thickening and swelling. Chest x-ray had no acute cardiopulmonary abnormality. Skin biopsy was performed by  Surgery revealing stasis dermatitis and no evidence of malignancy. Infectious Disease consultation was obtained and IV antibiotics were stopped. Blood cultures had no growth.   · 1/22/18 - Patient asked to start wearing elastic stockings which she has not started yet. She was given a prescription for Dyazide 1 p.o. daily.   · 2/26/18 - Ordered chemo to resume again. Patient unaware that she was supposed to resume chemo after her last visit in January. Continue magnesium infusions on day 1, 8 and 15. Prescribed Levaquin 500 mg p.o. daily x10 days for productive cough x1 week. History of viral illness consistent with influenza.  of 18 (0-35). Chest x-ray with no acute process.    · 3/5/18 - Cycle 12 chemotherapy started.   · 3/26/18 - Options discussed with patient. Decision made to discontinue IV Gemzar and orders written to start on Niraparib (Zejula) 300 mg p.o. daily as maintenance therapy.   · 4/11/18 - Niraparib discontinued due to insurance denial. Orders written to start Carboplatin-AUC 5 IV day 1 cycling every 21 days. Orders written for Neulasta 6 mg subcutaneous kit day 1 with palliative treatment intent and expected duration of treatment three months.   · 4/12/18 - CT chest, abdomen and pelvis with contrast revealed numerous tiny centrilobular nodules in the lungs favored to reflect infectious or inflammatory process. Nodules ranged 1-3 mm in size and are new from prior studies with metastatic disease not entirely excluded. Stable small hiatal hernia. Interval progression of metastatic disease involving the subcutaneous tissues at the ventral abdominal wall. Multiple soft tissue density nodules previously shown to be hypermetabolic on PET scan. New small crescent of low attenuation material along the periphery of the spleen with similar finding at the dome of the liver. Findings are concerning for peritoneal metastases. Density of the dome of the liver remained unchanged from multiple prior studies.  Stable calcified mass in the left pelvic sidewall. Urinary bladder wall thickening.   · 4/23/18 - Cycle 1 chemotherapy with Carboplatin administered along with Neulasta injection.   · 4/26/18 - Neulasta discontinued due to insurance denial.   · 5/14/18 - Patient received cycle 2 Carboplatin.   · 6/5/18 - Cycle 3 day 1 chemotherapy with Carboplatin administered.   · 6/20/18 - CT chest, abdomen and pelvis at [facility] showed re-demonstration of soft tissue mass in the ventral wall hernia left of the midline as well as the dome of the liver, likely representing metastatic disease similar as compared to the prior study. Additional calcified lesions present in the upper left hemipelvis and along the anterior abdominal wall to the right of the midline also likely representing metastatic disease. No definite new lesions were identified. Moderate to large stool burden likely related to mild constipation was present. No suspicious lung nodules were identified. The previously-described ground glass nodules appeared to have resolved. There was a stable subpleural nodule in the right upper lobe measuring 3 mm present since 2014 without significant changes.   · 6/26/18 - Cycle 4 day 1 Carboplatin administered with addition of Neulasta for febrile neutropenia prophylaxis.   · 6/29/18 - Reviewed CT scans with patient. Orders written to discontinue Carboplatin and Neulasta and plan to start Niraparib 300 mg by mouth daily as maintenance therapy. Prescribed Amlodipine 2.5 mg p.o. daily for chemo-induced hypertension.   · 7/18/18 - Orders written to increase weekly Magnesium Sulfate to 3 g IV weekly due to continued hypomagnesemia.   · 8/1/18 - Patient reports she has not received Niraparib (Zejula) to date.   · 8/30/18 - Patient’s phone was not working so though Niraparib approved, the drug company had not been able to get ahold of her. Patient supplied with drug company number so that she can start taking the pills.    · 9/6/18 -  of 20 (N).   · 9/18/18 - Urinalysis done for dysuria and back pain. Urine culture grew 20,000 to 50,000 colonies of urogenital ruy. Prescribed Bactrim DS one tablet twice daily for one week.   · September 2018 - Patient initiated on Zejula 300 mg p.o. daily.   · 10/1/18 - WBC 3.5, hemoglobin 12, platelet count 74,000. Niraparib (Zejula) dose held and then resumed when platelets above 100,000 at a dose of 200 mg daily.   · 10/15/18 - Patient received Niraparib (Zejula) at 200 mg p.o. daily with a platelet count of 198,000.   · 11/7/18 - WBC 6, hemoglobin 11.6, MCV 96.2, platelet count 137,000. Patient claims to have stopped taking Niraparib a few days prior because of cough. Asked to resume taking it. Ciprofloxacin 500 mg p.o. twice daily for one week for bronchitis.   · 12/3/18 - Magnesium Sulfate infusions changed to every two weeks, to send mag level before and give 3 grams. DC’d Magnesium Oxide and started Magnesium Plus Protein two pills twice daily.   · 1/4/19 - CT chest, abdomen and pelvis with new patchy nodular areas of airspace consolidation within the bilateral upper lobes, left greater than right, favored to be infectious or inflammatory. Interval enlargement of the peritoneal soft tissue masses within the pelvis compatible with progression of disease. Enlarging ventral hernia now containing multiple loops of small bowel and colon.   · 1/7/19 - Patient has not been coming in for weekly magnesium replacement as nursing has not been able to get ahold of her. Results of CT’s discussed with patient. Decision made to change her to IV chemotherapy with Carboplatin AUC-5 day 1 and pegylated liposomal Doxorubicin (Doxil) 30 mg/M2 IV day 1 to cycle every four weeks. Patient made aware of the limited choices of her treatment available.   · 1/31/19 - Echocardiogram showed LVEF 50-55% and otherwise normal echo Doppler study.   · 2/4/19 - New chemotherapy not initiated to date with difficulty  contacting patient for echocardiogram. Neulasta On-Pro 6 mg ordered with chemotherapy due to extensive prior exposure to chemotherapy, increasing risk of febrile neutropenia, history of neutropenic events on prior chemotherapy regimens.   · 2/15/19 - Patient started cycle 1 chemotherapy with Neulasta support.   · 3/6/19 -  of 28 (0-35).   · 3/15/19 - Cycle 2 Carboplatin with Liposomal Doxorubicin (Doxil) and Neulasta support initiated.     · 4/10/19 - Patient was requested to followup with Dr. Queen regarding hypotension and blood pressure medication dosing. Patient appears to be tolerating treatment well with cytopenias not requiring dose adjustments. No Doxil-related skin or mucus membrane toxicities or other significant toxicities to date.   · 4/12/19 - Cycle 3 chemotherapy given.   · 4/16/19 - CT chest, abdomen and pelvis with no evidence of active metastasis to the chest. Several tree-in-bud nodules within the periphery of the inferior right upper lobe likely infectious or inflammatory. Disease burden within abdomen and pelvis stable to slightly increased from prior CT. Specifically, a soft tissue mass along the right rectus muscle slightly increased in bulk. Multiple ventral hernias, some containing loops of bowel, with no evidence of acute bowel obstruction.   · 5/8/19 - Discussed CT results with patient. Overall stable disease and would like to continue same treatment. Dove soap and Hydrocortisone Cream p.r.n. prescribed for scattered rash on back.   · 5/10/19 - Cycle 4 Carboplatin and Liposomal Doxorubicin initiated.   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 It was ER positive, HER2/zuleima negative, PD-L1 negative. There was TOPO1 positivity and TUBB3 positivity. TMB was low (two mutations per megabyte MUTS/MB) and MSI was stable. There were 13 therapies considered with increased benefit. The 13 therapies with increased benefit  included Fulvestrant, Irinotecan, Letrozole, Topotecan, Anastrazole, Exemestane, Tamoxifen, all of which were referenced to [facility] as well as Abemaciclib, Everolimus, Gefitinib, Palbociclib, Ribociclib and Toremifene.     · 6/5/19 echocardiogram with preserved LV systolic function with EF around 60%.  · 6/7/19 cycle 5 chemotherapy with Neulasta support given.  Patient continued on mag oxide 400 mg p.o. twice daily and weekly IV 3 g mag sulfate infusions for persistent hypomagnesemia.  · 7/5/2019- cycle 6 chemotherapy with carboplatin and doxorubicin with Neulasta port initiated.  Ca125:  21 (0-35).  · 7/23/2019-CT chest abdomen and pelvis compared to CT from 4/16/2019 revealed multiple small pulmonary nodules unchanged from prior examination within the right upper and left lower lobes.  Complex ventral hernia similar to prior exam.  Soft tissue nodule along the right lateral margin of the right of the midline measuring 12 x 10 mm unchanged in size.  Irregular soft tissue nodular thickening along the margin of the most inferior aspect of the complex ventral hernia with thickness measuring up to 13 mm similar to prior examination.  Extensive calcified and soft tissue mass within the left lower quadrant decrease in size now measuring 2.6 x 2.3 cm previously 3.0 x 2.5 cm.  Partially calcified mass involving the right rectus sheath measuring 3.1 x 2.7 cm previously measured 3.3 x 2.9 cm.  Densely calcified mass measuring 2.1 x 1.6 cm unchanged previously measured 2 x 1.7 cm.  Right external iliac chain lymph node measuring 9 mm previously measured 5 mm.  · 8/2/2019 cycle 7 chemotherapy given.  · 8/30/2019-cycle 8 chemotherapy with carboplatin and doxorubicin with Neulasta support given.  · 9/27/2019 cycle 9 chemotherapy given.  · 10/1/19 - Echocardiogram showed Normal LV size and contractility EF of 60-65%. Normal RV size. Borderline left atrial enlargement. Aortic valve, mitral valve, tricuspid valve appears structurally  normal, no significant regurgitation seen.No pericardial effusion seen. Proximal aorta appears normal in size.  · 10/18/19 - Magnesium 1.5 (1.8-2.5).  IV magnesium replacement continued.  · 10/25/2019 cycle 10 chemotherapy given.  · 10/29/2019 patient had CT scan of the chest abdomen and pelvis-there is a new 2 mm nodule in the left lower lobe with a stable 2 mm nodule in the left lower lobe.  Follow-up in 6 months was recommended.  Stable multiple soft tissue density and calcified nodules within the ventral abdominal wall and left retroperitoneal fat consistent with nonviable metastatic disease.  These nodules have either decreased in size or stable.  · 11/22/2019 10 received cycle 11 of chemotherapy with Doxil and carboplatinum with Neulasta  · 12/8/2019 to 12/11/2019 patient was admitted to the hospital for neutropenic fever.  · 12/20/2019 patient received cycle 12 of chemotherapy with Doxil and carboplatinum with Neulasta  · 1/13/20: 2 D echo was normal with EF 56% to 60%  · 1/24/20-Patient received cycle 13 of combination chemotherapy with carboplatinum and Doxil  · 2/3/2020 patient had a  which was 25.4  · 2/21/2020-patient received cycle 14 of chemotherapy with carboplatinum and Doxil  · 3/6/2020 patient had CT scan of the chest, abdomen and pelvis this revealed a 3 mm nodule in the left lower lobe present on prior CT of the abdomen unchanged from July 2019.  Stable 3 mm right upper lobe nodule.  There is a lymph node in the AP window measuring 8 x 9 mm prominent left hilar lymph node measuring 12 mm previously was 7 x 7 mm no significant adenopathy was seen in the abdomen there is an area of irregular soft tissue in the left paramidline ventral hernia which is stable measuring 5.7 cm partially calcified mass in the right rectus measuring 3 x 2.5 cm which is also stable the prominent lymph nodes in the left mid hilum and mediastinum may be reactive or metastatic disease  · 4/17/2020-patient had cycle 15  of single agent carboplatinum  · 5/26/2020 patient had CT scan of the chest, abdomen and pelvis showed 3 new lung nodules measuring 7 mm in the left upper lobe, 5 mm in the left lower lobe and 4 mm in the right lower lobe.  There were additional small lung nodules which were unchanged.  Mildly prominent mediastinal and bilateral hilar lymph nodes mildly increased in size.  Right hilar node 9 mm previously was 5 mm.  Carinal node 9 mm previously was 6 mm.  The nodule at the right side of the hernia sac has increased to 1.5 cm from 1.2 cm.  Also a nodule in the subcutaneous fat currently measures 2.4 was previously 2 cm.  · 5/15/2020-patient received cycle 16 of carboplatinum       2. Deep vein thrombosis of left upper extremity diagnosis established in October of 2013.  · 10/16/13 - Venous Doppler of left upper extremity.  Impression:  Deep vein thrombosis involving the subclavian, axillary and brachial veins on the left side, superficial vein thrombosis involving the left basilic vein.  · 10/16/13 - Order written for Lovenox 120 mg subcutaneously daily until INR is in therapeutic range.  Order written for Coumadin 5 mg p.o. daily.  The patient is to follow PT and INR protocol.  · 5/20/16 - Venous Doppler bilateral lower extremities normal.  · 7/30/19 - Patient continued on Coumadin following the INR protocol.      3. Anemia diagnosis established in November 2013 and pernicious anemia diagnosis established March 2016.   · 11/15/13 - Hemoglobin is 7.8.  · 12/2/13 - Orders written to start Aranesp 200 mg subcutaneous every two weeks due to low hemoglobin secondary to chemo induced anemia.  Hemoglobin is 9.7.  Anemia workup is ordered.  · 12/03/13 - Ferritin 179.8 (N), folic acid 8.3 (N), vitamin B12 314, iron 104 (N), TIBC 298 (N), iron saturation 35 (N), Reticulocyte count 0.88 (N).  EPO level 124 (N).  Haptoglobin 22 (H).  · 10/22/14 - Hemoglobin 12.5, hematocrit 37.3, MCV 91.6.  · 10/23/14 - Anemia resolved.    · 3/8/16 - WBC 5.8, hemoglobin 11.7, MCV 90.3, platelet count 245,000. Vitamin B12 of 189 (180-914), ferritin 61 (), iron 91 (), TIBC 345 (228-428).   · 3/15/16 -  ().        · 3/22/16 - Intrinsic factor antibody positive, antiparietal antibody negative. Vitamin B12 at 1000 mcg IM weekly started, but the patient after receiving first dose on 3/22/16 did not come back for injections until 4/13/16.   · 4/13/16 - WBC 5.1, hemoglobin 12.5, MCV 87.9, platelet count 201,000. Patient given B12 injection and then continued at home.   · 5/10/16 - WBC 5.1, hemoglobin 12.5, platelet count 201,000.   · 6/14/16 - Monthly Vitamin B12 injections continued at home.   · 7/11/16 - WBC 5.48, hemoglobin 12.7, MCV 86.2, platelet count 200,000.   · 8/16/16 - Patient continued on monthly Vitamin B12 injections at home.   · 1/5/17 - WBC 2.6 with 38% neutrophils, 56% lymphocytes, 5% monocytes, hemoglobin 10.5, .3, platelet count 63,000. Patient continued on Vitamin B12 injections 1000 mcg IM monthly at home.   · 3/20/17 - Retic 0.41 (0.5-1.5), creatinine 1.0 (0.4-1.0). Iron 12 (), TIBC 270 (228-428), ferritin 259 (), haptoglobin 340 (), TSH 0.43 (0.34-5.6), folate 6.0 (5.9-24.8). Serum protein electrophoresis revealed decreased gammaglobulins. Serum EDU had no monoclonal gammopathy identified. Stool Hemoccult was negative.   · 6/8/17 - WBC 6.3, hemoglobin 8.3, MCV 92.4, platelet count 50,000. Procrit 40,000 units subq weekly added for chemotherapy-induced anemia. Patient continued on Vitamin B12 at 1000 mcg IM monthly at home.   · 7/5/17 - Iron 33 (), TIBC 328 (228-428), ferritin 211 (), TSH 2.46 (0.34-5.6).       · 7/31/17 - WBC 5.0, hemoglobin 11.2, MCV 89.7, platelet count 217,000. We will continue with the Procrit 40,000 units subq weekly for chemo-induced anemia when the hemoglobin falls below 10.0 and the patient is also to continue with the Vitamin B12 at 1000 mcg IM  monthly at home. Creatinine 1.24 (0.5-0.99).    · 8/28/17 - WBC 9.6, hemoglobin 8.7, MCV 93.7, platelet count 133,000. Patient is to continue with the Procrit 40,000 units subq weekly and will receive that today. She is also to continue with the Vitamin B12 at 1000 mcg IM monthly at home.  · 10/16/17 - WBC 7.5, hemoglobin 9.6, MCV 98.4, platelet count 195,000. Patient is to continue with Procrit 40,000 units subq weekly and will receive Procrit today.   · 1/1/18 - Creatinine 1.3 (0.4-1.0).    · 2/19/18 - Procrit given for hemoglobin 9.9.   · 2/26/18 - Continue Procrit 40,000 units weekly p.r.n. hemoglobin <10. Continue Vitamin B12 at 1000 mcg IM monthly at home.   · 3/26/18 - Hemoglobin 8.3. Procrit dose increased to 60,000 units weekly. Iron 29 (), TIBC 306 (228-428), and iron sat 9% (15-50). Iron 29 (), TIBC 306 (228-428), iron saturation 9% (15-50).   · 3/27/18 - Order written to start Ferrous sulfate 325 mg p.o. b.i.d.    · 4/2/18 - Stool heme negative x3.   · 4/30/18 - Patient had not started Ferrous sulfate 325 mg p.o. b.i.d. and verbalized understanding to do so and to notify the office if side effects are not tolerable.   · 5/24/18 - Patient was hospitalized at [facility] between 5/24/18 and 5/26/18 with dark tarry stool. INR was subtherapeutic. She was given IV Protonix and Protonix 40 mg daily. She underwent an EGD by David Rogers M.D. revealing small hiatal hernia, small submucosal nodule near the cardia, erosive gastritis. Pathology on gastric antrum and body biopsy revealed iron pill gastritis and no evidence of Helicobacter pylori. She then underwent a colonoscopy revealing diverticulosis, hemorrhoids and surgical changes from previous resection. It was recommended to consider outpatient M2A if hemoglobin continued to drop.   · 6/4/18 - Order written to discontinue iron pills and to use Injectafer 750 mg IV days 1 and 8 for low iron sats. Order written for Procrit 40,000 units subq  weekly. Iron 65 (), TIBC 328 (228-428), iron saturation 20% (15-50), ferritin 123 ().   · 6/29/18 - Discussed patient’s intolerance of oral iron due to gastritis. Oral iron discontinued with plans for Injectafer should iron level drop in the future.   · 11/7/18 - Patient claims not to be taking Vitamin B12 injections at home. Asked to resume those.   · 12/3/18 - Patient reports she has not been taking her B12 injections for a couple of months. She needs some syringes and needles for that.   · 2/4/19 - Patient is uncertain if she is currently taking Ferrous Sulfate or not. Patient with history of intolerance and will follow hemoglobin.   · 5/8/19 - Ferritin 64 ().  · 8/27/2019- iron 52 (), iron saturation 14% (15-50), TIBC 364 (228-420), and ferritin 74 ().  Injectafer 750 mg IV day 1 and 8 due to oral iron intolerance ordered.  · 8/30/2019- Injectafer 750 mg IV day 1 given.  Hemoglobin 10.8.  At the end of the infusion heart rate was 48 and the patient reported mild dizziness.  Patient was sent to the ED for evaluation with symptoms resolving rapidly.  Chest x-ray and CT head were negative for acute findings.  · 9/6/2019-Injectafer 750 mg IV day 8 given without adverse effects.  Hemoglobin 9.8.   · 9/25/19 - Iron 85 (). Iron saturation 25 (15-50). TIBC 335 (228-428). Ferritin  694 ().  Hemoglobin 10.3.  · 10/25/2019 hemoglobin 9.7.  Patient started on Retacrit 40,000 units subcu weekly.    Past Medical History:   Diagnosis Date   • Anemia 2013   • Cervical disc disorder 2013    Herniated disc, pinched nerve   • Clotting disorder (CMS/HCC) 2013    Low platelets from chemo   • Colon polyp 2013   • Deep vein thrombosis (CMS/HCC) 2013   • Diverticulosis 2013   • GERD (gastroesophageal reflux disease) 2016   • HL (hearing loss) 2016    I need hearing aids   • Hyperlipidemia 2013    Need my cholesterol rechecked   • Hypertension    • Joint pain 2013    Shoulder pain, torn rotator  cuff   • Low back pain 2013   • Neuromuscular disorder (CMS/[facility]) 2015    Shingles, pinched nerves in my neck   • Osteopenia 2014   • Osteoporosis    • Ovarian cancer (CMS/[facility])    • Pneumonia 2010    Have had it several times   • Spinal stenosis 2013    Cervical   • Urinary tract infection 2013    Have had several utis       Past Surgical History:   Procedure Laterality Date   • COLON SURGERY     • COLONOSCOPY  05/26/2018   • EXPLORATORY LAPAROTOMY     • HERNIA REPAIR     • HYSTERECTOMY     • OOPHORECTOMY           Current Outpatient Medications:   •  acetaminophen (TYLENOL) 500 MG tablet, Take 1,000 mg by mouth Every 6 (Six) Hours As Needed for Mild Pain ., Disp: , Rfl:   •  albuterol sulfate  (90 Base) MCG/ACT inhaler, Inhale 2 puffs Every 6 (Six) Hours As Needed for Wheezing., Disp: 6.7 g, Rfl: 0  •  atorvastatin (LIPITOR) 20 MG tablet, Take 20 mg by mouth every night at bedtime., Disp: , Rfl: 3  •  cyanocobalamin 1000 MCG/ML injection, Inject 1,000 mcg into the appropriate muscle as directed by prescriber Every 28 (Twenty-Eight) Days., Disp: , Rfl:   •  famotidine (PEPCID) 40 MG tablet, TAKE 1 TABLET BY MOUTH EVERY MORNING BEFORE BREAKFAST, Disp: 90 tablet, Rfl: 1  •  magnesium oxide (MAG-OX) 400 MG tablet, TAKE 1 TABLET BY MOUTH TWICE A DAY, Disp: 180 tablet, Rfl: 1  •  ondansetron ODT (ZOFRAN-ODT) 8 MG disintegrating tablet, Take 8 mg by mouth Every 8 (Eight) Hours As Needed for Nausea or Vomiting., Disp: , Rfl:   •  oxyCODONE (ROXICODONE) 10 MG tablet, Take 1 tablet by mouth 3 (Three) Times a Day As Needed for Moderate Pain ., Disp: 90 tablet, Rfl: 0  •  potassium chloride (K-DUR,KLOR-CON) 20 MEQ CR tablet, Take 2 tablets by mouth Every Morning., Disp: 30 tablet, Rfl: 0  •  pregabalin (LYRICA) 100 MG capsule, Take 1 capsule by mouth 3 (Three) Times a Day., Disp: 90 capsule, Rfl: 1  •  sertraline (ZOLOFT) 50 MG tablet, TAKE 1 TABLET BY MOUTH EVERY EVENING BEFORE BED, Disp: 90 tablet, Rfl: 1  •  Syringe  "23G X 1\" 3 ML misc, INJECT 1 ML B-12 ONCE MONTHLY, Disp: 1 each, Rfl: 3  •  temazepam (RESTORIL) 30 MG capsule, TAKE 1 CAPSULE BY MOUTH AT NIGHT AS NEEDED FOR SLEEP., Disp: 30 capsule, Rfl: 1  •  triamterene-hydrochlorothiazide (DYAZIDE) 37.5-25 MG per capsule, TAKE 1 CAPSULE BY MOUTH EVERY DAY, Disp: 90 capsule, Rfl: 1  •  warfarin (COUMADIN) 1 MG tablet, TAKE 1 TABLET BY MOUTH EVERY DAY AS DIRECTED, Disp: 45 tablet, Rfl: 2  •  warfarin (COUMADIN) 5 MG tablet, Active thrombosis  Indications: DVT/PE (active thrombosis), Disp: 30 tablet, Rfl: 0    Allergies   Allergen Reactions   • Morphine Nausea Only and Hives   • Penicillin V Potassium Rash   • Sulfa Antibiotics Rash   • Levaquin [Levofloxacin] Nausea Only and Dizziness     When taken with Coumadin   • Amoxicillin Rash   • Norco [Hydrocodone-Acetaminophen] Rash       Family History   Problem Relation Age of Onset   • Hypertension Mother    • Cancer Father    • Hypertension Father    • Hypertension Sister    • Hypertension Brother    • Stroke Brother        Cancer-related family history includes Cancer in her father.    Social History     Tobacco Use   • Smoking status: Never Smoker   • Smokeless tobacco: Never Used   Substance Use Topics   • Alcohol use: No     Frequency: Never     Comment: caffeine 32-64oz qd   • Drug use: No       I have reviewed and confirmed the accuracy of the patient's history: Chief complaint, HPI and ROS as entered by the MA/LPN/RN. Cindy Ball MD 06/11/20     SUBJECTIVE:    The patient is here for a follow up appointment.  Has developed rash on her face mostly.  The rash is itchy.  She denies any fevers or chills nausea or vomiting.  Patient is alone today for this appointment.      REVIEW OF SYSTEMS:  Review of Systems   Constitutional: Negative for chills and fever.   HENT: Negative for ear pain, mouth sores, nosebleeds and sore throat.    Eyes: Negative for photophobia and visual disturbance.   Respiratory: Negative for " "wheezing and stridor.    Cardiovascular: Negative for chest pain and palpitations.   Gastrointestinal: Negative for abdominal pain, diarrhea, nausea and vomiting.   Endocrine: Negative for cold intolerance and heat intolerance.   Genitourinary: Negative for dysuria and hematuria.   Musculoskeletal: Negative for joint swelling and neck stiffness.   Skin: Positive for rash. Negative for color change.   Neurological: Negative for seizures and syncope.   Hematological: Negative for adenopathy.        No obvious bleeding   Psychiatric/Behavioral: Negative for agitation, confusion and hallucinations.       OBJECTIVE:    Vitals:    06/11/20 1137   BP: 129/77   Pulse: 58   Resp: 18   Temp: 99.1 °F (37.3 °C)   TempSrc: Temporal   Weight: 82.1 kg (181 lb)   Height: 165.1 cm (65\")   PainSc: 0-No pain       ECOG  (1) Restricted in physically strenuous activity, ambulatory and able to do work of light nature    Physical Exam   Constitutional: She is oriented to person, place, and time. No distress.   HENT:   Head: Normocephalic and atraumatic.   Eyes: Conjunctivae and EOM are normal. Right eye exhibits no discharge. Left eye exhibits no discharge. No scleral icterus.   Neck: Normal range of motion. Neck supple. No thyromegaly present.   Cardiovascular: Normal rate, regular rhythm and normal heart sounds. Exam reveals no gallop and no friction rub.   Pulmonary/Chest: Effort normal. No stridor. No respiratory distress. She has no wheezes.   Abdominal: Soft. Bowel sounds are normal. She exhibits no mass. There is no tenderness. There is no rebound and no guarding.   Musculoskeletal: Normal range of motion. She exhibits no tenderness.   Lymphadenopathy:     She has no cervical adenopathy.   Neurological: She is alert and oriented to person, place, and time. She exhibits normal muscle tone.   Skin: Skin is warm. Rash noted. She is not diaphoretic. No erythema.   Mostly on the face, papular, red areas.   Psychiatric: She has a normal " mood and affect. Her behavior is normal. Judgment and thought content normal.   Nursing note and vitals reviewed.        RECENT LABS  WBC   Date Value Ref Range Status   06/05/2020 2.81 (L) 3.40 - 10.80 10*3/mm3 Final     RBC   Date Value Ref Range Status   06/05/2020 3.34 (L) 3.77 - 5.28 10*6/mm3 Final     Hemoglobin   Date Value Ref Range Status   06/05/2020 10.8 (L) 12.0 - 15.9 g/dL Final     Hematocrit   Date Value Ref Range Status   06/05/2020 33.9 (L) 34.0 - 46.6 % Final     MCV   Date Value Ref Range Status   06/05/2020 101.5 (H) 79.0 - 97.0 fL Final     MCH   Date Value Ref Range Status   06/05/2020 32.3 26.6 - 33.0 pg Final     MCHC   Date Value Ref Range Status   06/05/2020 31.9 31.5 - 35.7 g/dL Final     RDW   Date Value Ref Range Status   06/05/2020 16.8 (H) 12.3 - 15.4 % Final     RDW-SD   Date Value Ref Range Status   06/05/2020 60.2 (H) 37.0 - 54.0 fl Final     MPV   Date Value Ref Range Status   06/05/2020 10.9 6.0 - 12.0 fL Final     Platelets   Date Value Ref Range Status   06/05/2020 100 (L) 140 - 450 10*3/mm3 Final     Neutrophil %   Date Value Ref Range Status   06/05/2020 43.0 42.7 - 76.0 % Final     Lymphocyte %   Date Value Ref Range Status   06/05/2020 37.4 19.6 - 45.3 % Final     Monocyte %   Date Value Ref Range Status   06/05/2020 16.0 (H) 5.0 - 12.0 % Final     Eosinophil %   Date Value Ref Range Status   06/05/2020 3.2 0.3 - 6.2 % Final     Basophil %   Date Value Ref Range Status   06/05/2020 0.4 0.0 - 1.5 % Final     Neutrophils, Absolute   Date Value Ref Range Status   06/05/2020 1.21 (L) 1.70 - 7.00 10*3/mm3 Final     Lymphocytes, Absolute   Date Value Ref Range Status   06/05/2020 1.05 0.70 - 3.10 10*3/mm3 Final     Monocytes, Absolute   Date Value Ref Range Status   06/05/2020 0.45 0.10 - 0.90 10*3/mm3 Final     Eosinophils, Absolute   Date Value Ref Range Status   06/05/2020 0.09 0.00 - 0.40 10*3/mm3 Final     Basophils, Absolute   Date Value Ref Range Status   06/05/2020 0.01  0.00 - 0.20 10*3/mm3 Final     nRBC   Date Value Ref Range Status   12/11/2019 0.3 (H) 0.0 - 0.2 /100 WBC Final       Lab Results   Component Value Date    GLUCOSE 77 05/15/2020    BUN 13 05/15/2020    CREATININE 0.90 05/15/2020    EGFRIFNONA 71 05/15/2020    EGFRIFAFRI >60 06/07/2019    BCR 16.0 05/15/2020    K 4.5 05/15/2020    CO2 28.0 05/15/2020    CALCIUM 9.7 05/15/2020    ALBUMIN 3.80 05/15/2020    LABIL2 1.5 06/07/2019    AST 22 05/15/2020    ALT 9 05/15/2020         Assessment/Plan     There are no diagnoses linked to this encounter.    ASSESSMENT:    1. Recurrent ovarian cancer on carboplatinum, Doxil.  Patient had had carboplatinum Taxol, carbo Taxotere, Gemzar single agent, Niraparib and was on Doxil and carboplatinum.  Doxil has been discontinued due to approaching of lifetime dosing.  Refer to paradigm testing for future options at time of progression  2. Iron deficiency anemia: Intermittently on  iron  3. Pancytopenia: Due to chemotherapy: On Procrit  4. Hypomagnesemia: Continue to monitor levels  5. B12 deficiency on parenteral B12 injections  6. Mucositis due to chemotherapy: resolved  7. Rash on face likely drug induced  8. Tumor progression  9. Monitoring for chemotherapy toxicities      DISCUSSION:    I have reviewed her restaging CT scans.  Patient has new lung nodules and also enlarging nodules in the abdomen and pelvis consistent with progressing disease.  For that reason we will discontinue single agent carboplatinum and transition patient to topotecan.  Also note patient was on carboplatinum every 4 weeks with Doxil.  Doxil was discontinued due to approaching lifetime maximum dosing.  In the future carboplatinum may be reused but at 3 weekly intervals.    Discussed side effects of chemotherapy to include but not limited to  Chemotherapy side effects include, but not limited to, nausea, vomiting, bone marrow suppression, which can result in blood, platelet transfusion. There is also risk of  permanent bone marrow destruction, which can cause myelodysplastic side effects of her new chemotherapy or leukemia years down the line. There is risk of infection which can result in hospitalization and even death. There is also risk of fatigue, asthenia, alopecia which could become permanent. Chemo will help to reduce risk of relapse of cancer, but does not eliminate risk completely.    Specifically topotecan can lead to severe bone marrow suppression which may result in platelet transfusions    PLANS:     Discontinue carboplatinum due to progressive disease  Begin single agent Topotecan: Plan to begin next week   monthly: Check levels today.  Ongoing  Begin Medrol Dosepak for rash on the face which I believe is drug reaction  Continue magnesium oxide 400 mg three times a day and weekly IV replacement as needed.   Continue Coumadin  Continue b12 injections IM monthly. Transitioned injections to be done at the cancer center  Continue Retacrit 40,000 units subcu weekly for hemoglobin less than 10, For chemotherapy-induced anemia  Magic mouthwash PRN for mucositis  All questions answered to the best of my abilities         I have reviewed labs results, imaging, vitals, and medications with the patient today. Will follow up in 3 weeks  with me.    All questions answered to the best of my abilities    Patient verbalized understanding and is in agreement of the above plans.      I spent greater than 45 minutes in face-to-face time with the patient and 95% of that time were spent in counseling and coordination of care, also time was spent in discussing side effects of her new chemotherapy, including review of imaging and pathology; indications for treatment, goals of therapy, alternatives and risks - both common and rare - as well as surveillance and potential outcomes.

## 2020-06-12 ENCOUNTER — HOSPITAL ENCOUNTER (OUTPATIENT)
Dept: ONCOLOGY | Facility: HOSPITAL | Age: 65
Setting detail: INFUSION SERIES
End: 2020-06-12

## 2020-06-12 ENCOUNTER — HOSPITAL ENCOUNTER (OUTPATIENT)
Dept: ONCOLOGY | Facility: HOSPITAL | Age: 65
Setting detail: INFUSION SERIES
Discharge: HOME | End: 2020-06-12

## 2020-06-12 ENCOUNTER — LAB (OUTPATIENT)
Dept: LAB | Facility: HOSPITAL | Age: 65
End: 2020-06-12

## 2020-06-12 ENCOUNTER — HOSPITAL ENCOUNTER (OUTPATIENT)
Dept: ONCOLOGY | Facility: HOSPITAL | Age: 65
Discharge: HOME | End: 2020-06-12

## 2020-06-12 VITALS
OXYGEN SATURATION: 97 % | WEIGHT: 180 LBS | RESPIRATION RATE: 14 BRPM | BODY MASS INDEX: 29.99 KG/M2 | TEMPERATURE: 97.7 F | HEART RATE: 60 BPM | SYSTOLIC BLOOD PRESSURE: 125 MMHG | DIASTOLIC BLOOD PRESSURE: 72 MMHG | HEIGHT: 65 IN

## 2020-06-12 DIAGNOSIS — E83.42 HYPOMAGNESEMIA: Primary | ICD-10-CM

## 2020-06-12 DIAGNOSIS — T45.1X5A PANCYTOPENIA DUE TO ANTINEOPLASTIC CHEMOTHERAPY (HCC): ICD-10-CM

## 2020-06-12 DIAGNOSIS — I82.722 CHRONIC DEEP VEIN THROMBOSIS (DVT) OF LEFT UPPER EXTREMITY, UNSPECIFIED VEIN (HCC): ICD-10-CM

## 2020-06-12 DIAGNOSIS — C56.1 MALIGNANT NEOPLASM OF RIGHT OVARY (HCC): ICD-10-CM

## 2020-06-12 DIAGNOSIS — T45.1X5A ANTINEOPLASTIC CHEMOTHERAPY INDUCED ANEMIA: ICD-10-CM

## 2020-06-12 DIAGNOSIS — D64.81 ANTINEOPLASTIC CHEMOTHERAPY INDUCED ANEMIA: ICD-10-CM

## 2020-06-12 DIAGNOSIS — Z79.01 LONG TERM (CURRENT) USE OF ANTICOAGULANTS: Primary | ICD-10-CM

## 2020-06-12 DIAGNOSIS — D61.810 PANCYTOPENIA DUE TO ANTINEOPLASTIC CHEMOTHERAPY (HCC): ICD-10-CM

## 2020-06-12 LAB
BASOPHILS # BLD AUTO: 0.01 10*3/MM3 (ref 0–0.2)
BASOPHILS NFR BLD AUTO: 0.3 % (ref 0–1.5)
DEPRECATED RDW RBC AUTO: 57 FL (ref 37–54)
EOSINOPHIL # BLD AUTO: 0 10*3/MM3 (ref 0–0.4)
EOSINOPHIL NFR BLD AUTO: 0 % (ref 0.3–6.2)
ERYTHROCYTE [DISTWIDTH] IN BLOOD BY AUTOMATED COUNT: 16.1 % (ref 12.3–15.4)
HCT VFR BLD AUTO: 34.8 % (ref 34–46.6)
HGB BLD-MCNC: 11.3 G/DL (ref 12–15.9)
INR PPP: 2.6
LYMPHOCYTES # BLD AUTO: 0.64 10*3/MM3 (ref 0.7–3.1)
LYMPHOCYTES NFR BLD AUTO: 21.6 % (ref 19.6–45.3)
MAGNESIUM SERPL-MCNC: 1.7 MG/DL (ref 1.6–2.4)
MCH RBC QN AUTO: 32.6 PG (ref 26.6–33)
MCHC RBC AUTO-ENTMCNC: 32.5 G/DL (ref 31.5–35.7)
MCV RBC AUTO: 100.3 FL (ref 79–97)
MONOCYTES # BLD AUTO: 0.17 10*3/MM3 (ref 0.1–0.9)
MONOCYTES NFR BLD AUTO: 5.7 % (ref 5–12)
NEUTROPHILS # BLD AUTO: 2.14 10*3/MM3 (ref 1.7–7)
NEUTROPHILS NFR BLD AUTO: 72.4 % (ref 42.7–76)
PLATELET # BLD AUTO: 135 10*3/MM3 (ref 140–450)
PMV BLD AUTO: 10.7 FL (ref 6–12)
RBC # BLD AUTO: 3.47 10*6/MM3 (ref 3.77–5.28)
WBC NRBC COR # BLD: 2.96 10*3/MM3 (ref 3.4–10.8)

## 2020-06-12 PROCEDURE — 83735 ASSAY OF MAGNESIUM: CPT | Performed by: INTERNAL MEDICINE

## 2020-06-12 PROCEDURE — 85610 PROTHROMBIN TIME: CPT | Performed by: NURSE PRACTITIONER

## 2020-06-12 PROCEDURE — 96365 THER/PROPH/DIAG IV INF INIT: CPT

## 2020-06-12 PROCEDURE — 25010000003 HEPARIN LOCK FLUSH PER 10 UNITS: Performed by: INTERNAL MEDICINE

## 2020-06-12 PROCEDURE — 85025 COMPLETE CBC W/AUTO DIFF WBC: CPT | Performed by: INTERNAL MEDICINE

## 2020-06-12 PROCEDURE — 25010000002 MAGNESIUM SULFATE 2 GM/50ML SOLUTION: Performed by: INTERNAL MEDICINE

## 2020-06-12 PROCEDURE — 36591 DRAW BLOOD OFF VENOUS DEVICE: CPT

## 2020-06-12 RX ORDER — SODIUM CHLORIDE 0.9 % (FLUSH) 0.9 %
10 SYRINGE (ML) INJECTION AS NEEDED
Status: DISCONTINUED | OUTPATIENT
Start: 2020-06-12 | End: 2020-06-13 | Stop reason: HOSPADM

## 2020-06-12 RX ORDER — SODIUM CHLORIDE 9 MG/ML
250 INJECTION, SOLUTION INTRAVENOUS ONCE
Status: DISCONTINUED | OUTPATIENT
Start: 2020-06-12 | End: 2020-06-13 | Stop reason: HOSPADM

## 2020-06-12 RX ORDER — HEPARIN SODIUM (PORCINE) LOCK FLUSH IV SOLN 100 UNIT/ML 100 UNIT/ML
500 SOLUTION INTRAVENOUS AS NEEDED
Status: DISCONTINUED | OUTPATIENT
Start: 2020-06-12 | End: 2020-06-13 | Stop reason: HOSPADM

## 2020-06-12 RX ORDER — MAGNESIUM SULFATE HEPTAHYDRATE 40 MG/ML
2 INJECTION, SOLUTION INTRAVENOUS ONCE
Status: COMPLETED | OUTPATIENT
Start: 2020-06-12 | End: 2020-06-12

## 2020-06-12 RX ORDER — HEPARIN SODIUM (PORCINE) LOCK FLUSH IV SOLN 100 UNIT/ML 100 UNIT/ML
500 SOLUTION INTRAVENOUS AS NEEDED
Status: CANCELLED | OUTPATIENT
Start: 2020-06-12

## 2020-06-12 RX ORDER — SODIUM CHLORIDE 0.9 % (FLUSH) 0.9 %
10 SYRINGE (ML) INJECTION AS NEEDED
Status: CANCELLED | OUTPATIENT
Start: 2020-06-12

## 2020-06-12 RX ADMIN — HEPARIN 500 UNITS: 100 SYRINGE at 09:19

## 2020-06-12 RX ADMIN — MAGNESIUM SULFATE HEPTAHYDRATE 2 G: 40 INJECTION, SOLUTION INTRAVENOUS at 08:27

## 2020-06-12 RX ADMIN — Medication 10 ML: at 09:19

## 2020-06-12 NOTE — PROGRESS NOTES
Patient here for weekly mag with no complaints. Retacrit due as well but patients hemoglobin is 11.3, so will hold for now. Patient verbalized understanding.

## 2020-06-19 ENCOUNTER — HOSPITAL ENCOUNTER (OUTPATIENT)
Dept: ONCOLOGY | Facility: HOSPITAL | Age: 65
Setting detail: INFUSION SERIES
Discharge: HOME | End: 2020-06-19

## 2020-06-19 ENCOUNTER — LAB (OUTPATIENT)
Dept: LAB | Facility: HOSPITAL | Age: 65
End: 2020-06-19

## 2020-06-19 ENCOUNTER — TELEPHONE (OUTPATIENT)
Dept: ONCOLOGY | Facility: CLINIC | Age: 65
End: 2020-06-19

## 2020-06-19 VITALS
DIASTOLIC BLOOD PRESSURE: 71 MMHG | HEART RATE: 49 BPM | TEMPERATURE: 97.3 F | WEIGHT: 181 LBS | BODY MASS INDEX: 30.12 KG/M2 | SYSTOLIC BLOOD PRESSURE: 117 MMHG

## 2020-06-19 DIAGNOSIS — I82.722 CHRONIC DEEP VEIN THROMBOSIS (DVT) OF LEFT UPPER EXTREMITY, UNSPECIFIED VEIN (HCC): ICD-10-CM

## 2020-06-19 DIAGNOSIS — C56.1 MALIGNANT NEOPLASM OF RIGHT OVARY (HCC): ICD-10-CM

## 2020-06-19 DIAGNOSIS — T45.1X5A ANTINEOPLASTIC CHEMOTHERAPY INDUCED ANEMIA: ICD-10-CM

## 2020-06-19 DIAGNOSIS — T45.1X5A PANCYTOPENIA DUE TO ANTINEOPLASTIC CHEMOTHERAPY (HCC): ICD-10-CM

## 2020-06-19 DIAGNOSIS — Z79.01 LONG TERM (CURRENT) USE OF ANTICOAGULANTS: Primary | ICD-10-CM

## 2020-06-19 DIAGNOSIS — D64.81 ANTINEOPLASTIC CHEMOTHERAPY INDUCED ANEMIA: ICD-10-CM

## 2020-06-19 DIAGNOSIS — C56.9 MALIGNANT NEOPLASM OF OVARY, UNSPECIFIED LATERALITY (HCC): ICD-10-CM

## 2020-06-19 DIAGNOSIS — E83.42 HYPOMAGNESEMIA: Primary | ICD-10-CM

## 2020-06-19 DIAGNOSIS — Z95.828 PORT-A-CATH IN PLACE: ICD-10-CM

## 2020-06-19 DIAGNOSIS — D61.810 PANCYTOPENIA DUE TO ANTINEOPLASTIC CHEMOTHERAPY (HCC): ICD-10-CM

## 2020-06-19 LAB
BASOPHILS # BLD AUTO: 0.01 10*3/MM3 (ref 0–0.2)
BASOPHILS NFR BLD AUTO: 0.2 % (ref 0–1.5)
DEPRECATED RDW RBC AUTO: 57.1 FL (ref 37–54)
EOSINOPHIL # BLD AUTO: 0.08 10*3/MM3 (ref 0–0.4)
EOSINOPHIL NFR BLD AUTO: 1.8 % (ref 0.3–6.2)
ERYTHROCYTE [DISTWIDTH] IN BLOOD BY AUTOMATED COUNT: 15.9 % (ref 12.3–15.4)
HCT VFR BLD AUTO: 34.9 % (ref 34–46.6)
HGB BLD-MCNC: 11.3 G/DL (ref 12–15.9)
INR PPP: 3.9
LYMPHOCYTES # BLD AUTO: 1.45 10*3/MM3 (ref 0.7–3.1)
LYMPHOCYTES NFR BLD AUTO: 32.7 % (ref 19.6–45.3)
MCH RBC QN AUTO: 32.5 PG (ref 26.6–33)
MCHC RBC AUTO-ENTMCNC: 32.4 G/DL (ref 31.5–35.7)
MCV RBC AUTO: 100.3 FL (ref 79–97)
MONOCYTES # BLD AUTO: 0.53 10*3/MM3 (ref 0.1–0.9)
MONOCYTES NFR BLD AUTO: 11.9 % (ref 5–12)
NEUTROPHILS # BLD AUTO: 2.37 10*3/MM3 (ref 1.7–7)
NEUTROPHILS NFR BLD AUTO: 53.4 % (ref 42.7–76)
PLATELET # BLD AUTO: 147 10*3/MM3 (ref 140–450)
PMV BLD AUTO: 10.2 FL (ref 6–12)
RBC # BLD AUTO: 3.48 10*6/MM3 (ref 3.77–5.28)
WBC NRBC COR # BLD: 4.44 10*3/MM3 (ref 3.4–10.8)

## 2020-06-19 PROCEDURE — 96365 THER/PROPH/DIAG IV INF INIT: CPT

## 2020-06-19 PROCEDURE — 85610 PROTHROMBIN TIME: CPT

## 2020-06-19 PROCEDURE — 36591 DRAW BLOOD OFF VENOUS DEVICE: CPT

## 2020-06-19 PROCEDURE — 85025 COMPLETE CBC W/AUTO DIFF WBC: CPT | Performed by: NURSE PRACTITIONER

## 2020-06-19 PROCEDURE — 25010000002 MAGNESIUM SULFATE 2 GM/50ML SOLUTION: Performed by: INTERNAL MEDICINE

## 2020-06-19 PROCEDURE — 25010000003 HEPARIN LOCK FLUSH PER 10 UNITS: Performed by: INTERNAL MEDICINE

## 2020-06-19 PROCEDURE — 80053 COMPREHEN METABOLIC PANEL: CPT | Performed by: INTERNAL MEDICINE

## 2020-06-19 PROCEDURE — 86304 IMMUNOASSAY TUMOR CA 125: CPT | Performed by: INTERNAL MEDICINE

## 2020-06-19 PROCEDURE — 83735 ASSAY OF MAGNESIUM: CPT | Performed by: INTERNAL MEDICINE

## 2020-06-19 RX ORDER — SODIUM CHLORIDE 9 MG/ML
250 INJECTION, SOLUTION INTRAVENOUS ONCE
Status: COMPLETED | OUTPATIENT
Start: 2020-06-19 | End: 2020-06-19

## 2020-06-19 RX ORDER — MAGNESIUM SULFATE HEPTAHYDRATE 40 MG/ML
2 INJECTION, SOLUTION INTRAVENOUS ONCE
Status: COMPLETED | OUTPATIENT
Start: 2020-06-19 | End: 2020-06-19

## 2020-06-19 RX ORDER — POTASSIUM CHLORIDE 1500 MG/1
TABLET, EXTENDED RELEASE ORAL
Qty: 150 TABLET | Refills: 5 | Status: SHIPPED | OUTPATIENT
Start: 2020-06-19 | End: 2020-09-30

## 2020-06-19 RX ORDER — SODIUM CHLORIDE 0.9 % (FLUSH) 0.9 %
20 SYRINGE (ML) INJECTION AS NEEDED
Status: CANCELLED | OUTPATIENT
Start: 2020-06-19

## 2020-06-19 RX ORDER — HEPARIN SODIUM (PORCINE) LOCK FLUSH IV SOLN 100 UNIT/ML 100 UNIT/ML
500 SOLUTION INTRAVENOUS AS NEEDED
Status: CANCELLED | OUTPATIENT
Start: 2020-06-19

## 2020-06-19 RX ORDER — SODIUM CHLORIDE 0.9 % (FLUSH) 0.9 %
20 SYRINGE (ML) INJECTION AS NEEDED
Status: DISCONTINUED | OUTPATIENT
Start: 2020-06-19 | End: 2020-06-20 | Stop reason: HOSPADM

## 2020-06-19 RX ORDER — HEPARIN SODIUM (PORCINE) LOCK FLUSH IV SOLN 100 UNIT/ML 100 UNIT/ML
500 SOLUTION INTRAVENOUS AS NEEDED
Status: DISCONTINUED | OUTPATIENT
Start: 2020-06-19 | End: 2020-06-20 | Stop reason: HOSPADM

## 2020-06-19 RX ADMIN — MAGNESIUM SULFATE HEPTAHYDRATE 2 G: 40 INJECTION, SOLUTION INTRAVENOUS at 08:29

## 2020-06-19 RX ADMIN — HEPARIN 500 UNITS: 100 SYRINGE at 09:28

## 2020-06-19 RX ADMIN — Medication 20 ML: at 09:29

## 2020-06-19 RX ADMIN — SODIUM CHLORIDE 250 ML: 900 INJECTION, SOLUTION INTRAVENOUS at 08:29

## 2020-06-19 NOTE — PROGRESS NOTES
Pt here for IV Mag and possible retacrit injection. Hgb 11.3 today, so retacrit held due to hgb being greater than 10. Informed pt and she voiced understanding.

## 2020-06-19 NOTE — TELEPHONE ENCOUNTER
Called and left message for pt regarding her potassium. Let pt know that I spoke with Dr Ball, and she wants her to continue taking potassium as before. Left message letting pt know that her potassium was reordered.

## 2020-06-19 NOTE — PROGRESS NOTES
Education for Administration of Chemotherapy and/or Biotherapy     NAME: Tahira Zimmerman  : 1955  MRN: [id number]  DATE OF SERVICE: 20  REASON FOR VISIT: PATIENT EDUCATION    Ms. Tahira Zimmerman is here today for education on her upcoming chemotherapy and/or biotherapy recommended for treatment of her disease.    I reviewed treatment options, obtained signed consent, and answered any questions she had regarding the administration of topotecan.      Tahira Zimmerman has already consulted with Cindy Ball MD for the treatment of ovarian cancer. Her oncologist has discussed the same treatment options with the patient and answered her questions prior to today's visit on 2020.    TREATMENT GOALS:  The goal of the treatment is to:    [] Curative intent - intent is cure; cure implies patient survival will not be restricted by current cancer diagnosis   [] Control  - intent is to extend survival but not long enough to meet definition of cure for patient with that diagnosis   [x] Palliative - means treatment given in a non-curative setting to optimize symptom control, improve quality of life, and improve survival    This treatment has been explained to Tahira Zimmerman. Alternative methods of treatment, if any, have been explained to Tahira Zimmerman, as have the benefits and risks of each. Based on the physician's explanation of the benefits and risks of this treatment and any alternatives available, the patient agrees the potential benefits outweighs the potential risks involved. I have explained to the patient the most likely complications which might occur from this treatment. The patient understands along with the treatment additional medications may be necessary to lessen the side effects.     SIDE EFFECTS:  Possible side effects may include but are not limited to, any of the following, or a combination of the following:    [x]  Abdominal pain  []  Hypertension []  Risk of  bruising [] Other    []  Allergic Reaction []  Hypersensitivity reaction []  Risk of leukopenia [] Other    []  Anxiety []  Increased risk of infection []  Risk of neutropenia [] Other    []  Back pain []  Injection site reaction  []  Risk of thrombocytopenia [] Other    [x]  Bone pain []  Injection site ulceration []  Secondary malignancies [] Other    []  Cardiovascular events  []  Itching []  Sexual side effects  [] Other    []  Central neurotoxicity []  Joint pain [x]  Shortness of breath [] Other    []  Chest pain []  Kidney damage []  Skin changes [] Other    []  Chills []  LFT imbalances [x]  Skin rash [] Other    [x]  Constipation []  Liver damage []  Somnolence [] Other    []  Confusion [x]  Loss of appetite []  Sore throat [] Other    [x]  Cough []  Low blood pressure []  Swelling [] Other    [x]  Diarrhea []  Lung damage []  Taste Changes [] Other    []  Depression []  Menopausal symptoms []  Temperature sensitivity [] Other    []  Dizziness []  Menstrual irregularities []  Tinnitus  [] Other    []  Ecchymosis [x]  Mouth sores []  Upper respiratory tract infection  [] Other    []  Electrolyte imbalances []  Muscle aches  []  Visual changes [] Other    [x]  Fatigue []  Nail changes [x]  Vomiting [] Other    []  Fertility effects []  Nosebleeds [x]  Weakness [] Other    [x]  Fevers [x]  Nausea  []  Weight gain [] Other    []  Fluid retention []  Pericardial effusion  []  Weight loss [] Other    []  Forgetfulness []  Peripheral neuropathy [] Other  [] Other    []  Hair loss/discoloration []  Petechiae [] Other  [] Other    [x]  Headaches []  Photosensitivity  [] Other  [] Other    []  Hearing loss/change []  Pleural effusion  [] Other  [] Other    []  Heart damage []  Rash [] Other  [] Other    []  Hypoalbuminemia []  Risk of anemia [] Other  [] Other    []  Hyperglycemia  []  Risk of bleeding [] Other  [] Other    []  Hyponatremia  []  Risk of blood clots (DVT/PE) [] Other  [] Other     VASCULAR  ACCESS:  The patient has undergone vascular access/port placement.  The patient was advised although uncommon, leakage of an infused medication from the vein or venous access device (port) may lead to skin breakdown and/or other tissue damage.  The patient was advised he/she may have pain, bleeding, and/or bruising from the insertion of a needle in their vein or venous access device (port).  The patient was further advised despite proper technique, infection with redness and irritation may rarely occur at the site where the needle was inserted.  The patient was advised if complications occur, additional medical treatment is available.    BLOOD COUNT MONITORING:  While receiving treatment, it has been explained to the patient blood counts will be monitored.  This may include but is not limited to a complete blood count (CBC). The patient may develop neutropenia, anemia, or thrombocytopenia. This has been explained and a handout was provided to the patient.     NUTRITION:   It was explained to the patient about nutrition and its importance while undergoing chemotherapy and/or biotherapy. Certain medications will be prescribed during the treatment which may change the way foods taste or smell. These changes may cause poor or no appetite. Food is fuel for your body, and if it does not get the fuel it needs,  the patient may become malnourished, which can lead to severe fatigue. It was discussed with the patient about calories and how to add high-calorie foods to their diet.  Protein was also mentioned in regards to how it will help make new cells for the body. Information was given to Tahira Zimmerman regarding good protein sources.   We also discussed with the patient the importance of  eating and drinking every 2-3 hours while awake. We discussed fluid intake of at least 6 to 8 ounce glasses of liquids per day to stay hydrated. Examples are listed below:   Water  Juice (fruit or vegetable)  Soda Sport Drinks Soup    Milk  Ensure, Boost, Glucerna Ice Cream Popsicles Jello   Milkshakes Pudding  Gatorade Sherbert Yogurt     REPRODUCTION:  Reproductive risks were discussed, including appropriate use of birth control, and protection during sexual relations.The risks of becoming pregnant while receiving chemotherapy and/or biotherapy were reviewed for females.  Males were instructed to use appropriate birth control to prevent conception during treatment.  We also discussed the importance of using reliable barrier methods while participating in intimate activities as this may expose their partners to a potentially harmful drug. The importance of pregnancy prevention was emphasized due to risks of increased chance of birth defects and miscarriages.     Tahira Zimmerman was provided handouts on:   1. Home instructions  2. Complete blood counts and terminology  3. Nutrition during cancer therapy   4. Fluids and dehydration  5. Mouth care  6. Cancer-related fatigue  7. Hair loss/Wigs   8. Handouts from [employer] on specific drugs   9. A signed copy of chemotherapy/biotherapy consent     TOPICS EDUCATION PROVIDED EDUCATION REINFORCED COMMENTS   ANEMIA:  role of RBC, cause, s/s, ways to manage, role of transfusion [x] []    THROMBOCYTOPENIA:  role of platelet, cause, s/s, ways to prevent bleeding, things to avoid, when to seek help [x] []    NEUTROPENIA:  role of WBC, cause, infection precautions, s/s of infection, when to call MD [x] []    NUTRITION & APPETITE CHANGES:  importance of maintaining healthy diet & weight, ways to manage to improve intake, dietary consult, exercise regimen [x] []    DIARRHEA:  causes, s/s of dehydration, ways to manage, dietary changes, when to call MD [x] []    CONSTIPATION:  causes, ways to manage, dietary changes, when to call MD [x] []    NAUSEA & VOMITING:  cause, use of antiemetics, dietary changes, when to call MD [x] []    MOUTH SORES:  causes, oral care, ways to manage [x] []    ALOPECIA:   cause, ways to manage, resources [x] []    INFERTILITY & SEXUALITY:  causes, fertility preservation options, sexuality changes, ways to manage, importance of birth control [x] []    NERVOUS SYSTEM CHANGES:  causes, s/s, neuropathies, cognitive changes, ways to manage [x] []    PAIN:  causes, ways to manage [x] [] ????   SKIN & NAIL CHANGES:  cause, s/s, ways to manage [x] []    ORGAN TOXICITIES:  cause, s/s, need for diagnostic tests, labs, when to notify MD [x] []    SURVIVORSHIP:  distress, distress assessment, secondary malignancies, early/late effects, follow-up, social issues, social support [] []    HOME CARE:  use of spill kits, storing of PO chemo, how to manage bodily fluids [] []    MISCELLANEOUS:  drug interactions, administration, vesicant, et [] []        PAST MEDICAL HISTORY:  Past Medical History:   Diagnosis Date   • Anemia 2013   • Cervical disc disorder 2013    Herniated disc, pinched nerve   • Clotting disorder (CMS/[facility]) 2013    Low platelets from chemo   • Colon polyp 2013   • Deep vein thrombosis (CMS/[facility]) 2013   • Diverticulosis 2013   • GERD (gastroesophageal reflux disease) 2016   • HL (hearing loss) 2016    I need hearing aids   • Hyperlipidemia 2013    Need my cholesterol rechecked   • Hypertension    • Joint pain 2013    Shoulder pain, torn rotator cuff   • Low back pain 2013   • Neuromuscular disorder (CMS/[facility]) 2015    Shingles, pinched nerves in my neck   • Osteopenia 2014   • Osteoporosis    • Ovarian cancer (CMS/[facility])    • Pneumonia 2010    Have had it several times   • Spinal stenosis 2013    Cervical   • Urinary tract infection 2013    Have had several utis       PAST SURGICAL HISTORY:  Past Surgical History:   Procedure Laterality Date   • COLON SURGERY     • COLONOSCOPY  05/26/2018   • EXPLORATORY LAPAROTOMY     • HERNIA REPAIR     • HYSTERECTOMY     • OOPHORECTOMY         CURRENT MEDICATIONS:    Current Outpatient Medications:   •  acetaminophen (TYLENOL) 500 MG tablet, Take 1,000  "mg by mouth Every 6 (Six) Hours As Needed for Mild Pain ., Disp: , Rfl:   •  albuterol sulfate  (90 Base) MCG/ACT inhaler, Inhale 2 puffs Every 6 (Six) Hours As Needed for Wheezing., Disp: 6.7 g, Rfl: 0  •  atorvastatin (LIPITOR) 20 MG tablet, Take 20 mg by mouth every night at bedtime., Disp: , Rfl: 3  •  cyanocobalamin 1000 MCG/ML injection, Inject 1,000 mcg into the appropriate muscle as directed by prescriber Every 28 (Twenty-Eight) Days., Disp: , Rfl:   •  famotidine (PEPCID) 40 MG tablet, TAKE 1 TABLET BY MOUTH EVERY MORNING BEFORE BREAKFAST, Disp: 90 tablet, Rfl: 1  •  KLOR-CON 20 MEQ CR tablet, Take 3 tablets by mouth every morning and take 2 tablets by mouth at bedtime., Disp: 150 tablet, Rfl: 5  •  magnesium oxide (MAG-OX) 400 MG tablet, TAKE 1 TABLET BY MOUTH TWICE A DAY, Disp: 180 tablet, Rfl: 1  •  ondansetron ODT (ZOFRAN-ODT) 8 MG disintegrating tablet, Take 8 mg by mouth Every 8 (Eight) Hours As Needed for Nausea or Vomiting., Disp: , Rfl:   •  oxyCODONE (ROXICODONE) 10 MG tablet, Take 1 tablet by mouth 3 (Three) Times a Day As Needed for Moderate Pain ., Disp: 90 tablet, Rfl: 0  •  pregabalin (LYRICA) 100 MG capsule, Take 1 capsule by mouth 3 (Three) Times a Day., Disp: 90 capsule, Rfl: 1  •  sertraline (ZOLOFT) 50 MG tablet, TAKE 1 TABLET BY MOUTH EVERY EVENING BEFORE BED, Disp: 90 tablet, Rfl: 1  •  Syringe 23G X 1\" 3 ML misc, INJECT 1 ML B-12 ONCE MONTHLY, Disp: 1 each, Rfl: 3  •  temazepam (RESTORIL) 30 MG capsule, TAKE 1 CAPSULE BY MOUTH AT NIGHT AS NEEDED FOR SLEEP., Disp: 30 capsule, Rfl: 1  •  triamterene-hydrochlorothiazide (DYAZIDE) 37.5-25 MG per capsule, TAKE 1 CAPSULE BY MOUTH EVERY DAY, Disp: 90 capsule, Rfl: 1  •  warfarin (COUMADIN) 1 MG tablet, TAKE 1 TABLET BY MOUTH EVERY DAY AS DIRECTED, Disp: 45 tablet, Rfl: 2  •  warfarin (COUMADIN) 5 MG tablet, Active thrombosis  Indications: DVT/PE (active thrombosis), Disp: 30 tablet, Rfl: 0  •  methylPREDNISolone (MEDROL, JONES,) 4 MG " tablet, Take as directed on package instructions., Disp: 1 each, Rfl: 0    ALLERGIES:  Allergies   Allergen Reactions   • Morphine Nausea Only and Hives   • Penicillin V Potassium Rash   • Sulfa Antibiotics Rash   • Levaquin [Levofloxacin] Nausea Only and Dizziness     When taken with Coumadin   • Amoxicillin Rash   • Norco [Hydrocodone-Acetaminophen] Rash       FAMILY HISTORY:  Family History   Problem Relation Age of Onset   • Hypertension Mother    • Cancer Father    • Hypertension Father    • Hypertension Sister    • Hypertension Brother    • Stroke Brother      ONCOLOGIC FAMILY HISTORY:  Cancer-related family history includes Cancer in her father.    SOCIAL HISTORY:  Social History     Tobacco Use   • Smoking status: Never Smoker   • Smokeless tobacco: Never Used   Substance Use Topics   • Alcohol use: No     Frequency: Never     Comment: caffeine 32-64oz qd   • Drug use: No     I have reviewed the history of present illness, past medical history, family history, and social history, since the patient was last seen on 6/19/2020.    REVIEW OF SYSTEMS:  Review of Systems   Constitutional: Negative for activity change, appetite change, chills, fatigue, fever and unexpected weight change.   HENT: Negative for congestion, postnasal drip, rhinorrhea and sore throat.    Eyes: Negative for photophobia, pain, redness and visual disturbance.   Respiratory: Negative for apnea, cough, choking, chest tightness, shortness of breath and stridor.    Cardiovascular: Negative for chest pain, palpitations and leg swelling.   Gastrointestinal: Negative for abdominal pain, constipation, diarrhea, nausea and vomiting.   Genitourinary: Negative for dysuria and hematuria.   Musculoskeletal: Negative for arthralgias, back pain, myalgias and neck pain.   Skin: Negative for rash and wound.   Allergic/Immunologic: Negative for environmental allergies.   Neurological: Positive for headaches ( chronic - 1 daily ). Negative for dizziness,  weakness, light-headedness and numbness.   Hematological: Negative for adenopathy. Does not bruise/bleed easily.   Psychiatric/Behavioral: Negative for dysphoric mood. The patient is not nervous/anxious.    All other systems reviewed and are negative.      PHYSICAL EXAMINATION:  Physical Exam   Constitutional: She is oriented to person, place, and time. She appears well-developed and well-nourished. No distress.   Overweight    HENT:   Head: Normocephalic and atraumatic.   Mouth/Throat: No oropharyngeal exudate.   Eyes: Conjunctivae and EOM are normal.   Wears corrective lenses    Neck: Normal range of motion. Neck supple.   Cardiovascular:   No murmur heard.  Pulmonary/Chest: Effort normal. No respiratory distress.   Musculoskeletal: Normal range of motion.   Neurological: She is alert and oriented to person, place, and time.   Skin: Skin is warm and dry. She is not diaphoretic.   Psychiatric: She has a normal mood and affect. Her behavior is normal.   Vitals reviewed.    LABS:  WBC   Date Value Ref Range Status   06/19/2020 4.44 3.40 - 10.80 10*3/mm3 Final     RBC   Date Value Ref Range Status   06/19/2020 3.48 (L) 3.77 - 5.28 10*6/mm3 Final     Hemoglobin   Date Value Ref Range Status   06/19/2020 11.3 (L) 12.0 - 15.9 g/dL Final     Hematocrit   Date Value Ref Range Status   06/19/2020 34.9 34.0 - 46.6 % Final     MCV   Date Value Ref Range Status   06/19/2020 100.3 (H) 79.0 - 97.0 fL Final     MCH   Date Value Ref Range Status   06/19/2020 32.5 26.6 - 33.0 pg Final     MCHC   Date Value Ref Range Status   06/19/2020 32.4 31.5 - 35.7 g/dL Final     RDW   Date Value Ref Range Status   06/19/2020 15.9 (H) 12.3 - 15.4 % Final     RDW-SD   Date Value Ref Range Status   06/19/2020 57.1 (H) 37.0 - 54.0 fl Final     MPV   Date Value Ref Range Status   06/19/2020 10.2 6.0 - 12.0 fL Final     Platelets   Date Value Ref Range Status   06/19/2020 147 140 - 450 10*3/mm3 Final     Neutrophil %   Date Value Ref Range Status    06/19/2020 53.4 42.7 - 76.0 % Final     Lymphocyte %   Date Value Ref Range Status   06/19/2020 32.7 19.6 - 45.3 % Final     Monocyte %   Date Value Ref Range Status   06/19/2020 11.9 5.0 - 12.0 % Final     Eosinophil %   Date Value Ref Range Status   06/19/2020 1.8 0.3 - 6.2 % Final     Basophil %   Date Value Ref Range Status   06/19/2020 0.2 0.0 - 1.5 % Final     Neutrophils, Absolute   Date Value Ref Range Status   06/19/2020 2.37 1.70 - 7.00 10*3/mm3 Final     Lymphocytes, Absolute   Date Value Ref Range Status   06/19/2020 1.45 0.70 - 3.10 10*3/mm3 Final     Monocytes, Absolute   Date Value Ref Range Status   06/19/2020 0.53 0.10 - 0.90 10*3/mm3 Final     Eosinophils, Absolute   Date Value Ref Range Status   06/19/2020 0.08 0.00 - 0.40 10*3/mm3 Final     Basophils, Absolute   Date Value Ref Range Status   06/19/2020 0.01 0.00 - 0.20 10*3/mm3 Final     nRBC   Date Value Ref Range Status   12/11/2019 0.3 (H) 0.0 - 0.2 /100 WBC Final     Lab Results   Component Value Date    GLUCOSE 78 06/19/2020    BUN  06/19/2020      Comment:      Testing performed by alternate method    BUN 15 06/19/2020    CREATININE 0.75 06/19/2020    EGFRIFNONA 78 06/19/2020    EGFRIFAFRI >60 06/07/2019    BCR  06/19/2020      Comment:      Testing not performed    K 3.4 (L) 06/19/2020    CO2 27.0 06/19/2020    CALCIUM 9.0 06/19/2020    ALBUMIN 3.70 06/19/2020    LABIL2 1.5 06/07/2019    AST 18 06/19/2020    ALT 9 06/19/2020     Assessment/Plan   There are no diagnoses linked to this encounter.  ASSESSMENT   1. Encounter for medication management and education of chemotherapy/biotherapy    2. Recurrent ovarian cancer   3. Chronic headaches   4. Port-A-Catheter     PLAN  • Start topotecan on 6/25/2020  • Port flushes every 4-6 weeks when off treatment   •  pre-medications - Zofran - pt thinks she has supply on hand   • RTC 7/2/2020 with Dr. Ball - needs to have appt scheduled     I have reviewed labs results, imaging, vitals, and  medications with the patient today.    A total of 40 minutes were spent with the patient, with 100% of time spent in education and counseling.    Electronically signed by IRENA Dumas, 06/22/20, 12:05 PM.

## 2020-06-22 ENCOUNTER — OFFICE VISIT (OUTPATIENT)
Dept: ONCOLOGY | Facility: CLINIC | Age: 65
End: 2020-06-22

## 2020-06-22 VITALS
DIASTOLIC BLOOD PRESSURE: 73 MMHG | SYSTOLIC BLOOD PRESSURE: 131 MMHG | TEMPERATURE: 98 F | BODY MASS INDEX: 30.99 KG/M2 | WEIGHT: 186 LBS | RESPIRATION RATE: 18 BRPM | HEART RATE: 54 BPM | HEIGHT: 65 IN

## 2020-06-22 DIAGNOSIS — Z95.828 PORT-A-CATH IN PLACE: ICD-10-CM

## 2020-06-22 DIAGNOSIS — Z79.899 ENCOUNTER FOR MEDICATION MANAGEMENT: Primary | ICD-10-CM

## 2020-06-22 PROBLEM — G89.29 CHRONIC HEADACHES: Status: ACTIVE | Noted: 2020-06-22

## 2020-06-22 PROBLEM — R51.9 CHRONIC HEADACHES: Status: ACTIVE | Noted: 2020-06-22

## 2020-06-22 PROCEDURE — 99215 OFFICE O/P EST HI 40 MIN: CPT | Performed by: NURSE PRACTITIONER

## 2020-06-22 RX ORDER — TRIAMTERENE AND HYDROCHLOROTHIAZIDE 37.5; 25 MG/1; MG/1
CAPSULE ORAL
Qty: 90 CAPSULE | Refills: 1 | Status: SHIPPED | OUTPATIENT
Start: 2020-06-22 | End: 2020-12-02

## 2020-06-23 DIAGNOSIS — C56.1 MALIGNANT NEOPLASM OF RIGHT OVARY (HCC): ICD-10-CM

## 2020-06-23 DIAGNOSIS — G25.81 RESTLESS LEG SYNDROME: ICD-10-CM

## 2020-06-23 RX ORDER — TEMAZEPAM 30 MG/1
CAPSULE ORAL
Qty: 30 CAPSULE | Refills: 1 | Status: SHIPPED | OUTPATIENT
Start: 2020-06-23 | End: 2020-08-25

## 2020-06-23 RX ORDER — SODIUM CHLORIDE 9 MG/ML
250 INJECTION, SOLUTION INTRAVENOUS ONCE
Status: CANCELLED | OUTPATIENT
Start: 2020-07-16

## 2020-06-23 RX ORDER — SODIUM CHLORIDE 9 MG/ML
250 INJECTION, SOLUTION INTRAVENOUS ONCE
Status: CANCELLED | OUTPATIENT
Start: 2020-06-24

## 2020-06-23 RX ORDER — SODIUM CHLORIDE 9 MG/ML
250 INJECTION, SOLUTION INTRAVENOUS ONCE
Status: CANCELLED | OUTPATIENT
Start: 2020-08-06

## 2020-06-24 ENCOUNTER — TELEPHONE (OUTPATIENT)
Dept: ONCOLOGY | Facility: CLINIC | Age: 65
End: 2020-06-24

## 2020-06-24 NOTE — TELEPHONE ENCOUNTER
PT NEEDS TO CANCEL HER APPT ON 6/25/20 PT IS HAVING CATARACT SURGERY PT WILL CALL BACK TO RESCHEDULE

## 2020-06-25 ENCOUNTER — HOSPITAL ENCOUNTER (OUTPATIENT)
Dept: ONCOLOGY | Facility: HOSPITAL | Age: 65
Setting detail: INFUSION SERIES
End: 2020-06-25

## 2020-06-25 ENCOUNTER — APPOINTMENT (OUTPATIENT)
Dept: ONCOLOGY | Facility: HOSPITAL | Age: 65
End: 2020-06-25

## 2020-06-25 ENCOUNTER — APPOINTMENT (OUTPATIENT)
Dept: LAB | Facility: HOSPITAL | Age: 65
End: 2020-06-25

## 2020-06-25 NOTE — TELEPHONE ENCOUNTER
Please call patient to reschedule her appointments that she had to cancel this morning [phone]

## 2020-07-02 ENCOUNTER — TELEPHONE (OUTPATIENT)
Dept: ONCOLOGY | Facility: HOSPITAL | Age: 65
End: 2020-07-02

## 2020-07-07 ENCOUNTER — APPOINTMENT (OUTPATIENT)
Dept: PAIN MEDICINE | Facility: CLINIC | Age: 65
End: 2020-07-07

## 2020-07-07 ENCOUNTER — LAB (OUTPATIENT)
Dept: LAB | Facility: HOSPITAL | Age: 65
End: 2020-07-07

## 2020-07-07 ENCOUNTER — OFFICE VISIT (OUTPATIENT)
Dept: ONCOLOGY | Facility: CLINIC | Age: 65
End: 2020-07-07

## 2020-07-07 VITALS
HEART RATE: 51 BPM | DIASTOLIC BLOOD PRESSURE: 71 MMHG | HEIGHT: 65 IN | BODY MASS INDEX: 29.99 KG/M2 | TEMPERATURE: 97.1 F | RESPIRATION RATE: 16 BRPM | WEIGHT: 180 LBS | SYSTOLIC BLOOD PRESSURE: 115 MMHG

## 2020-07-07 DIAGNOSIS — C56.9 MALIGNANT NEOPLASM OF OVARY, UNSPECIFIED LATERALITY (HCC): Primary | ICD-10-CM

## 2020-07-07 DIAGNOSIS — C56.1 MALIGNANT NEOPLASM OF RIGHT OVARY (HCC): ICD-10-CM

## 2020-07-07 LAB
BASOPHILS # BLD AUTO: 0.01 10*3/MM3 (ref 0–0.2)
BASOPHILS NFR BLD AUTO: 0.2 % (ref 0–1.5)
CANCER AG125 SERPL QL: 93.7 U/ML (ref 0–38.1)
DEPRECATED RDW RBC AUTO: 54.9 FL (ref 37–54)
EOSINOPHIL # BLD AUTO: 0.12 10*3/MM3 (ref 0–0.4)
EOSINOPHIL NFR BLD AUTO: 2.9 % (ref 0.3–6.2)
ERYTHROCYTE [DISTWIDTH] IN BLOOD BY AUTOMATED COUNT: 15.6 % (ref 12.3–15.4)
HCT VFR BLD AUTO: 33.8 % (ref 34–46.6)
HGB BLD-MCNC: 10.8 G/DL (ref 12–15.9)
LYMPHOCYTES # BLD AUTO: 1.21 10*3/MM3 (ref 0.7–3.1)
LYMPHOCYTES NFR BLD AUTO: 29.7 % (ref 19.6–45.3)
MCH RBC QN AUTO: 31.7 PG (ref 26.6–33)
MCHC RBC AUTO-ENTMCNC: 32 G/DL (ref 31.5–35.7)
MCV RBC AUTO: 99.1 FL (ref 79–97)
MONOCYTES # BLD AUTO: 0.56 10*3/MM3 (ref 0.1–0.9)
MONOCYTES NFR BLD AUTO: 13.7 % (ref 5–12)
NEUTROPHILS NFR BLD AUTO: 2.18 10*3/MM3 (ref 1.7–7)
NEUTROPHILS NFR BLD AUTO: 53.5 % (ref 42.7–76)
PLATELET # BLD AUTO: 150 10*3/MM3 (ref 140–450)
PMV BLD AUTO: 10.9 FL (ref 6–12)
RBC # BLD AUTO: 3.41 10*6/MM3 (ref 3.77–5.28)
WBC # BLD AUTO: 4.08 10*3/MM3 (ref 3.4–10.8)

## 2020-07-07 PROCEDURE — 85025 COMPLETE CBC W/AUTO DIFF WBC: CPT

## 2020-07-07 PROCEDURE — 36415 COLL VENOUS BLD VENIPUNCTURE: CPT

## 2020-07-07 PROCEDURE — 99215 OFFICE O/P EST HI 40 MIN: CPT | Performed by: INTERNAL MEDICINE

## 2020-07-07 PROCEDURE — 86304 IMMUNOASSAY TUMOR CA 125: CPT

## 2020-07-07 NOTE — PROGRESS NOTES
Hematology/Oncology Outpatient Follow Up    PATIENT NAME:Tahira Zimmerman  :1955  MRN: [id number]  PRIMARY CARE PHYSICIAN: Noemy Queen MD  REFERRING PHYSICIAN: Noemy Queen MD    Chief Complaint   Patient presents with   • Follow-up     Right ovarian cancer          HISTORY OF PRESENT ILLNESS:   1. Recurrent ovarian cancer diagnosis established in 2013.  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  The patient was treated with surgery and then postoperatively with adjuvant chemotherapy consisting of Carboplatin and Taxol.  Six months later, she had recurrence of disease intra-abdominally between the rectum and vagina, which was treated with intraabdominal peritoneal chemotherapy.  She had been free of disease since that time.  She reported feeling a mass in the left side of her abdomen approximately six months ago.  This gradually grew and in early 2013 she had her daughter feel the mass.  The daughter works for Dr. David Rogers and the patient at that time also complained of intermittent rectal bleeding.  Consultation with Dr. David Rogers was obtained.  The patient had a CT scan of the abdomen and pelvis performed on 13 revealing left lower quadrant mass measuring 9.7 x 9.3 x 6 cm demonstrating areas of dense calcification, soft tissue density and cystic density within it.  Stromal tumor is felt to be most likely a possibly fibroma.  It is generated with necrosis and calcification.  Possible palpable mass in the rectus muscle is seen with calcification and deformity of the rectus muscle and just below this a second mass intra-abdominally was seen measuring 2 cm in the greatest diameter suspicious for second intraabdominal tumor.  The mass separate from the largest mass measuring 2.6 cm in the dependent portion of pelvis is seen on the right of the midline.  No retroperitoneal  lymphadenopathy was seen.  No free air, free fluid or other abnormality was present.  The patient was scheduled for an outpatient colonoscopy, but had syncopal episode while sitting in the toilet the night before and was brought to the emergency room early in the morning by her daughter and son-in-law.  The patient had apparently stopped breathing for a few seconds and did not have a pulse, but started breathing before CPR could be initiated.  In the emergency room, the patient had an EKG revealing sinus rhythm nonspecific T-wave flattening; metabolic panel revealed a glucose of 148, creatinine of 1.3, CBC was normal.  She was treated with intravenous fluid.  The patient’s case was then discussed with Dr. Anabell Monique and patient was subsequently admitted to [facility].  The patient underwent a colonoscopy by Dr. David Rogers on 06/27/13 revealing sigmoid diverticulosis and grade II internal and external hemorrhoids, but no mass or colitis was seen.  CT-guided biopsy of the mass was performed on 06/27/13 as well revealing metastatic papillary adenocarcinoma with numerous psammoma bodies.  Pathology comment stated prior records were not available; however, with history of ovarian cancer the current biopsy would be consistent with metastases from that malignancy.  Oncology consult was obtained and I initially saw the patient on 06/27/2013 where the patient had claimed to have little bit of pain in the area of disease.  She reported being frequently constipated, denies any weight loss or fevers.  She did report having some night sweats, but thought that was because of her menopause.  On 06/27/13 metastatic workup including labs and CT scans were initiated.  CT scan of the chest on 06/27/13 revealed no acute disease in the chest.  A 1.4 cm size focal area of decreased density was noted in the lateral aspect of the right lobe of the liver consistent with a benign cyst or hemangioma.  There was a very  small hiatal hernia measuring 2.2 cm in diameter.  A CT scan of the head performed 06/27/13 revealed no acute intracranial abnormality noted.  CA-125 was 40 units/ml (0-35), CA 19-9 was 11.8 units/ml (0-35), CEA level was 0.8 ng/ml (0-3), TSH level was 1.09 IU/ml (0.34-5.6).  The patient was in workup for the syncopal episode, a carotid Doppler was performed on June 28 revealing an essentially normal study showing a reduction in diameter from 0-15% bilaterally.  A Holter monitor was completed on 06/27/13, which was unremarkable except for a few premature atrial contractions.  The patient was felt stable and subsequently was discharged home on 06/28/13.  Post discharge, the patient was seen at [facility] on 07/05/13 by Dr. Kathryn Thrasher who discussed treatment options with the patient including surgery.  The patient is in the office today 07/16/13 in follow up post hospitalization.  She is reporting that surgery is planned for July 30th of this month at .  · 7/30/13 to 8/3/13 - The patient was in [facility].  The patient was admitted for surgery for her recurrent ovarian cancer.  The patient had exploratory laparotomy, omentectomy, bowel resection, liver biopsy and appendectomy.  Pathology revealed of the omentum metastatic serous carcinoma of the transverse colon, segmental resection showed metastatic serous carcinoma involving mesenteric fat.  The liver wedge resection showed fibrosclerotic thickening of the hepatic capsule.  Benign liver parenchyma.  No evidence of metastatic tumor.  Appendix showed fibrous obliteration of the lumen.  Negative for metastatic carcinoma.  Rectum and sigmoid colon segmental resection showed metastatic serous carcinoma invading the colorectal mucosa uninvolved by tumor.  Vaginal mass showed metastatic serous carcinoma.  Margins are positive for tumor.  · 9/24/13 - Chemotherapy orders were written for patient to start carboplatin 550  mg IV day one and Taxol 330 mg IV day one to be cycled every three weeks.  · 10/10/13 - The patient started cycle #1 of chemotherapy consisting of Carboplatin and Taxol.  · 09/25/13 -  is 10.6 normal.  · 10/01/13 - CT of the chest, abdomen and pelvis revealed new reticular nodular occupancy in the anterior segment of the right upper lobe, could reflect an inflammation or infectious etiology or could reflect unusual persistence of metastatic tumor.  New liver lesions, the smaller one appears to be implant on the surface of the liver, the larger may also represent an implant including the intrahepatic.  Several small mesenteric nodes seen throughout the abdomen and pelvis.  · 10/02/13 - Port placed by Dr. Sen.  · 10/24/13 - Orders written to start Neupogen daily and if more than three Neupogen are needed to give Neulasta after next chemo.  ANC is 0.15.  · 10/31/14 - Patient given cycle 2 of chemotherapy with Taxol and Carboplatin.  · 11/21/13 - The patient started cycle 3 of chemotherapy consisting of Carboplatin and Taxol.  · 11/26/13 - CT scan of chest, abdomen and pelvis revealed no evidence of active disease in the chest.  Reticulonodular opacity has resolved.  Metastatic lesion impressing upon the hepatic dome similar to prior exam.  No evidence of a change.  No evidence of new disease.  Postsurgical changes in the pelvis and throughout the retroperitoneum in the large bowel.  Finding containing anterior abdominal wall hernia containing fat tissue and enhancing vessels, 3 cm in diameter.  · 12/2/13 - Orders written to start Neupogen per protocol as well as Aranesp due to low hemoglobin and ANC.  ANC is 0.14.  Hemoglobin is 9.7.  · 12/11/13 - Orders written to hold chemotherapy until next week and decrease dose by 20% due to low platelet count.  Platelet count is 85,000.  · 12/16/13 - The patient received cycle 4 of Carboplatin and Taxol at a 20% dose reduction so Taxol dose is 260 mg and Carboplatin is 440  mg.  · 12/16/13 - CA-125 12.6 (N).  · 12/19/13 - PET/CT scan.  Impression:  1. There is no evidence of hypermetabolism in the liver.  Specifically of the dominant lesion in or adjacent to the right hepatic dome that was seen and described on the recent CT study of 11/26/2013.  It is photopenic relative to the surrounding liver.  2.  There is no evidence of hypermetabolic soft issue mass lesion or free fluid in the abdomen or pelvis.  3.  The tonsillar tissues are slightly enlarged and have moderate activity level.  This maybe physiologic.  Correlation with oral cavity, examination is recommended.  4.  Mild amount of activity in the right level II jugular lymph chain without focally enlarged lymph nodes is of questionable significance.  · 1/6/13 - The patient received cycle 5 of chemotherapy with Taxol and Carboplatin.  · 1/27/14 - The patient started on cycle 6 of Taxol and Carboplatin.  · 2/18/14 - CT of the abdomen and pelvis with contrast; stable lesions impressing upon the hepatic dome, unchanged compared to the prior exam.  Multiple anterior abdominal wall hernias re-demonstrated.  Those above the umbilicus contain omental fat.  Below and to the left of the umbilicus as anterior abdominal hernia containing omental fat in nondilated small bowel which is larger than seen previously.  · 2/20/14 - The patient received cycle 7 of chemotherapy with Taxol and Carboplatin.   · 3/11/14 - Order written to discontinue chemotherapy due to patient has completed 7 cycles of chemotherapy and CT scans suggest possible remission.  · 3/11/14 -  11.0 (N).  · 06/05/14 - CT scan of the chest abdomen and pelvis with contrast: There are multiple anterior abdominal wall hernias.  There are 2 larger anterior abdominal wall hernias at the level of the transverse colon, which contain omental fat.  There are at least 2 small anterior abdominal wall hernias that contain omental fat.  There is a moderate sized anterior abdominal wall  hernia at the level of the umbilicus, eccentric to the left, which contain non-dilated bowel.  Interval decrease in the size of two deposit impressing on the liver.  The third tiny deposit has been stable.  · 8/19/14 -  10.4 (N).  · 12/19/14 -   10.0 (N)  · 1/7/15 - CT chest, abdomen and pelvis with stable appearance of the chest with several micronodules. Small hiatal hernia, slightly larger than previous exam, increased from 1.5 to 2.5 cm in diameter. Bochdalek hernia unchanged. Multiple hepatic lesions. The two dominant lesions at the right hepatic dome had decreased in size from previous. The remaining tiny nodules measured 3-5 mm in diameter and were unchanged. There was no evidence of new intra-abdominal or pelvic mass or free fluid. There were multiple anterior abdominal wall hernias which had increased in size.   · 7/27/15 - Comprehensive metabolic panel with no significant abnormalities. CA-125 of 21 (0-35).   · 10/26/15 - WBC 6, hemoglobin 13, platelet count 204,000. Heart rate 47. CA-125 of 20 (0-35).    · 2/18/16 - CT chest with contrast with stable micronodular density seen in the lungs without significant change from prior exam. CT abdomen and pelvis with regression of liver lesions with no new evidence of metastasis. Multiple ventral hernias again noted without significant change from prior exam. Creatinine 0.9 (0.6-1.3).    · 2/25/16 - Patient hospitalized at [facility] between 2/25/16 and 2/27/16 with pyelonephritis secondary to E-coli. She was given two days of IV Rocephin and then placed on oral Levaquin. She was asked to hold her Coumadin, but then had nausea and vomiting because of Levaquin and stopped the Levaquin also. Her CA-125 was 32 (0-35) and CEA 1.3 (0-5). Her urine grew >100,000 E-coli. CT of the abdomen and pelvis was done which revealed 4.1 x 1.8 cm sized mild ovoid area of decreased density in the liver parenchyma along the anteromedial aspect of the dome of  the right lobe of the liver, unchanged significantly since the previous study of 2/18/16. There was suspicion of a very small pericardial effusion. There was suspicion of a small hiatal hernia. There were several hernias in the anterior abdominal wall. A 3.5 x 3.1 cm incised well-circumscribed abnormal density in the left retroperitoneal fatty soft tissue at the level of the left iliac crest was suggestive of tumor recurrence. There was an abnormal density in the left retroperitoneal soft tissues in the left flank that partially surrounded the left kidney and extended downward to the left pelvic area. Diverticulosis of the distal descending colon and sigmoid colon were seen.     · 3/8/16 - Patient prescribed Bactrim DS 1 p.o. b.i.d. for 10 days for pyelonephritis, which was partially treated with Rocephin and Levaquin. Urine with 40,000 E-coli treated with Macrobid for 7 days.  of 20 (0-35).    · 3/31/16 - PET scan with area of low density anteriorly in the right lobe of the liver with no significant radiopharmaceutical uptake to suggest malignancy. Multiple round soft tissue density masses showing increased radiopharmaceutical activity and multiple anterior abdominal wall hernias.   · 5/10/16 - Patient complains of venous enlargement of the left chest wall around the port. Has not been seen by  yet, but has an appointment on 5/23/16. Complained of a cough.   · 5/12/16 - Infusaport contrast study with no evidence of fibrin sheath. Chest x-ray with mild cardiac prominence.   · 5/23/16 - Patient seen in consultation by Sheng Bowman M.D. at [facility] and a chemotherapy trial recommended.   · 6/1/16 -   of 27.1 (0-35).    · 6/14/16 - WBC 5.71, hemoglobin 12.4, platelet count 188,000. Patient is scheduled for surgery at  on 6/16/16 after further discussion with  physicians. Discussed adjuvant chemotherapy.   · 6/16/16 - Excisional biopsy of abdominal wall mass performed by Sheng Bowman  M.D. at [facility] with pathology revealing metastatic high-grade papillary serous carcinoma.   · 7/7/16 - Received a call from the patient that Tramadol was not working and that she was given Norco #24 after her biopsy at  which did help her pain. Patient prescribed Norco 5/325 one p.o. q. 4-6 hours p.r.n. #60 with no refills. Echocardiogram with left ventricular inferior wall hypokinesis with ejection fraction of 50-55%.   · 7/8/16 - Patient seen in consultation by Sheng Bowman M.D. in consultation at  for metastatic high-grade papillary serous carcinoma diagnosed by excisional biopsy on 6/16/16 with carcinomatosis and patient not a surgical resection candidate. Genetic sequencing and susceptibility testing of tumor was ordered. Biopsy was done of anterior abdominal wall.   · 7/7/16 - Echocardiogram with left atrial enlargement. Mild mitral regurgitation. Left ventricular proximal inferior wall hypokinesis with ejection fraction of 50-55%.   · 7/11/16 - While waiting for genomics results, in order not to delay treatment further, order written for Taxotere 60 mg/M2 IV over one hour followed by Carboplatin AUC 5 over one hour, cycles to be repeated every three weeks.  Comprehensive metabolic panel normal.  26 (0-35).    · 725/16 - Pain contract signed for use of Norco.   · 8/5/16 - Patient stated that she experienced a red rash and was itchy post taking Norco. Norco discontinued and Tramadol refilled.   · 8/16/16 - Patient started cycle 1 chemotherapy. WBC 7.1, hemoglobin 11.2, MCV 88.7, platelet count 94,000. Patient complained of nausea for a week post chemo. Already getting Emend, Aloxi and Decadron. Added Omeprazole 40 mg daily orally.   · 8/17/16 - Urine culture with >100,000 E-coli.   · 8/25/16 - Cycle 2 chemotherapy given.   · 9/9/16 - Magnesium 1.3, replaced intravenously. Potassium 3.4 (3.6-5.1), increased oral potassium supplementation.    · 9/16/16 - Cycle 3 chemotherapy given.    · 9/19/16 - Comprehensive metabolic panel with no significant abnormality.   · 9/20/16 - CT abdomen and pelvis with evidence of progressive metastatic disease in the abdomen and pelvis with interval enlargement of several soft tissue attenuations and subcutaneous nodules in the anterior abdominal wall. Interval enlargement of a left pelvic soft tissue attenuation mass. A lentiform area of low attenuation in the dome of the liver stable in size. Multiple anterior abdominal wall hernias containing fat and/or bowel. Marked pelvic floor laxity. CT chest negative for evidence of metastatic disease. Tiny pulmonary nodules in the right lung stable since 2013 consistent with a benign etiology.   · 9/23/16 - Macrobid 100 mg p.o. b.i.d. x7 days prescribed for UTI. Current chemotherapy discontinued after discussion and new chemotherapy orders written. Carboplatin AUC-5 IV day 1 and Doxil (Liposomal Doxorubicin) 30 mg/M2 IV day 1 to cycle q. 21 days.  of 18 (0-35). Magnesium 1.6, replaced intravenously. Comprehensive metabolic panel with potassium 3.4 (3.6-5.1).     · 10/13/16 - Patient started on cycle 1 of Carboplatin and Liposomal Doxorubicin followed by Neulasta.   · 11/3/16 - Cycle 2 Carbo and Doxil given.   · 11/17/16 - Magnesium 1.0, replaced intravenously. Oral potassium supplement increased.   · 11/29/16 - CT chest with small non-calcified right pulmonary nodules unchanged. Benign bone island in the midthoracic vertebral body along with benign bony hemangiomas in the middle thoracic vertebral bodies. CT abdomen and pelvis with mild progressive metastatic disease from CT of September 2016. Anterior abdominal wall mass superiorly mildly increased in size with new area noted as described measuring 3 x 1.7 cm. 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 Stable area of decreased uptake in the dome of the liver not hypermetabolic on recent PET scan, likely representing  cyst or focal fatty infiltration. Stable small hiatal hernia, fat-containing Bochdalek’s hernia and anterior abdominal wall hernias with stable pelvic floor relaxation.    · 12/1/16 - Cycle 3 chemotherapy given.   · 12/8/16 - Current chemotherapy discontinued and patient started on Gemcitabine 1000 mg/M2 IV days 1, 8, 15 q. 28 days.   · 12/22/16 - Patient seen in followup by Juliana Roy M.D. at the pain clinic, treated with Oxycodone and Lyrica. Transaminases normal. Patient started cycle 1 chemotherapy.   · 12/29/16 - Magnesium 1.1 (1.8-2.5), replaced intravenously.   · 1/5/17 - Cycle 1 day 15 chemotherapy held as patient leaving for vacation to [place]. Chemotherapy dose decreased by 20%. Patient complains of diarrhea for which Imodium prescribed.   · 1/11/17 - BRCA-1 and BRCA-2 negative.   · 1/12/17 - Echocardiogram with ejection fraction 60% or greater.   · 1/19/17 - Comprehensive metabolic panel with no significant abnormality. Patient started cycle 2 chemotherapy.   · 2/2/17 - Urine with >100,000 E-coli treated with Cipro 500 mg p.o. b.i.d. x1 week.   · 2/6/17 - Magnesium 1.1 (1.8-2.5), replaced intravenously.    · 2/23/17 - Patient started cycle 3 chemotherapy.   · 2/27/17 - CT chest with interval development of too numerous to count very small pulmonary nodules, ill-defined ground glass opacities concerning for development of pulmonary metastatic disease. Stable left-sided chest port. CT abdomen and pelvis with stable appearance of multiple small soft tissue densities within the abdomen near midline subcutaneous fat of the abdominal wall. Interval decrease in size of largely calcified left pelvic mass and multiple fat in bowel containing small ventral hernias.   · 3/6/17 - Pulmonary consultation obtained for lung nodules. Discussed CT results with patient. Decision made to continue present chemotherapy until further lung evaluation as abdominal disease better.  of 18 (0-35).    · 3/16/17 -  Patient started cycle 4 chemotherapy, but treatment held from day 8 onwards because of hospitalization.   · 3/19/17 - Patient was hospitalized at [facility] between 3/19/17 and 3/25/17 with chest pain. Chest x-ray had revealed increased mild bibasilar airspace disease. Troponin I and EKG’s were negative. INR was elevated. Patient was treated with antibiotics. EGD was performed revealing small hiatal hernia and esophageal dilatation was performed. Bronchoscopy was performed also with no intrabronchial lesions identified. IgA was 51 (), IgG 320 (600-1500) and IgM 19 (). Lexiscan nuclear stress test had no evidence of reversible myocardial ischemia with normal left ventricular ejection fraction of 58%. CT chest had development of patchy bilateral ground glass opacities most pronounced involving the left upper lobe and bilateral lower lobes. Multiple tiny bilateral pulmonary nodules were less conspicuous. The patient underwent a bronchoscopy by Jerica Esparza M.D. with right upper lobe bronchoalveolar lavage revealing benign squamous cells and bronchial cells with pulmonary macrophages present. There was mild acute inflammation. Negative for malignant cells. Prilosec was changed to Famotidine for hypomagnesemia.    · 4/6/17 - Magnesium 1.2 (1.8-2.5). Comprehensive metabolic panel with no significant abnormality. Patient seen in follow-up by Fito Ram M.D. at [facility]. Treated with Lyrica and Oxycodone.    · 5/4/17 - Patient complains of a rash itching on her right upper arm. Eczematous rash noted and patient prescribed Cyclocort 0.1% b.i.d. Magnesium infusions continued with each chemo. Order written to resume chemotherapy.   · 5/16/17 - Cycle 5 chemotherapy started.   · 5/26/17 - Magnesium 1.4 (1.8-2.5), replaced intravenously. Potassium 2.9 (3.6-5.1), KCL increased to 20 mEq three in the morning and three in the evenings.   · 5/30/17 - PET scan with remaining metabolic activity within the  nodular areas. The suggested mass which is partially calcified and necrotic within the left aspect of the pelvis seems slightly larger with increased metabolic activity. There was more calcification in these areas compared to prior study, suggesting inflammation.      · 6/8/17 - Patient complaining of shortness of breath. Does take HCTZ at home. Chest x-ray ordered and chemotherapy continued along with weekly magnesium replacement. Chest x-ray with no acute cardiopulmonary abnormalities.   · 6/13/17 - Patient received cycle 6 of chemotherapy.   · 7/31/17 - WBC 5.0, hemoglobin 11.2, MCV 89.7, platelet count 217,000. Patient is to resume chemotherapy starting with cycle 7 on Wednesday of this week.  of 19 (<35). Potassium 3.3 (3.5-5.3).     · 8/2/17 - Patient began cycle 7 of chemotherapy.   · 8/30/17 - Patient started cycle 8 chemotherapy.   · 9/5/17 - Patient seen in followup at Pain Management by Fito Ram M.D. and continued on treatment with Oxycodone and Lyrica.   · 9/13/17 - Patient was hospitalized at [facility] for a day with dizziness secondary to dehydration, hypokalemia and anemia. She was given 1 unit of packed red blood cells and electrolytes were replaced. Chest x-ray revealed a subtle opacity in the left lateral lung base favored to represent atelectasis. Magnesium 1.3 (1.5-2.5), patient continued on replacement.    · 9/22/17 - Chemotherapy and magnesium replacement continued with decrease in chemotherapy dose by 10% secondary to cytopenias.   · 9/25/17 - Cycle 9 chemotherapy started.   · 10/12/17 - CT of the chest with contrast showed several tiny pulmonary nodules. One within the right lower lobe appears new from prior exams. Two adjacent foci of nodularity within the medial right lower lobe suggestive of minor infectious or inflammatory process. CT of the abdomen and pelvis with contrast which showed soft tissue nodules along the anterior abdominal and pelvic walls unchanged from previous  scan. Also a partially calcified mass within the left pelvis unchanged. No acute process identified within the abdomen or pelvis. Several left paracentral ventral hernias unchanged. No evidence of acute bowel obstruction.   · 10/16/17 - Order written for Levaquin 500 mg p.o. daily as the patient states that she has had a productive cough, fever and chills and with the results of the CT scan showing a possible infectious versus inflammatory process. Order also written to continue chemotherapy and magnesium replacement as previously prescribed.   · 10/23/17 - Cycle 10 chemotherapy started.   · 11/19/17 - Patient went to the Emergency Room at [facility] complaining of right lower extremity redness and fever for a day. Venous Doppler had no evidence of a DVT and patient was discharged home on Clindamycin.   · 11/21/17 -  of 17 (0-35).   · 12/4/17 - Cycle 11 chemotherapy started.   · 12/20/17 - PET scan with multiple hypermetabolic soft tissue nodules abutting the anterior abdominal wall, stable from previous examination. Peripherally calcified left lower quadrant mass near the ascending colon stable in size demonstrating no hypermetabolic uptake. Previously had maximum SUV of 7. Right medial basilar pulmonary nodules resolved.   · 12/22/17 - CT left lower extremity with soft tissue swelling with skin thickening present along the anterior and medial aspect of the leg, slightly more pronounced around the palpable marker seen within the upper medial aspect of the lower extremity.   · 12/26/17 - Elastic stockings prescribed for lower extremity edema.   · 1/8/18 - Patient hospitalized at [facility] between 1/8/18 and 1/11/18 with fever of up to 101 degrees and painful rash on the lower extremities of several weeks’ duration. She was found to be hypokalemic and MRI of the lower extremities revealed thickening and swelling. Chest x-ray had no acute cardiopulmonary abnormality. Skin biopsy was performed by Surgery revealing stasis  dermatitis and no evidence of malignancy. Infectious Disease consultation was obtained and IV antibiotics were stopped. Blood cultures had no growth.   · 1/22/18 - Patient asked to start wearing elastic stockings which she has not started yet. She was given a prescription for Dyazide 1 p.o. daily.   · 2/26/18 - Ordered chemo to resume again. Patient unaware that she was supposed to resume chemo after her last visit in January. Continue magnesium infusions on day 1, 8 and 15. Prescribed Levaquin 500 mg p.o. daily x10 days for productive cough x1 week. History of viral illness consistent with influenza.  of 18 (0-35). Chest x-ray with no acute process.    · 3/5/18 - Cycle 12 chemotherapy started.   · 3/26/18 - Options discussed with patient. Decision made to discontinue IV Gemzar and orders written to start on Niraparib (Zejula) 300 mg p.o. daily as maintenance therapy.   · 4/11/18 - Niraparib discontinued due to insurance denial. Orders written to start Carboplatin-AUC 5 IV day 1 cycling every 21 days. Orders written for Neulasta 6 mg subcutaneous kit day 1 with palliative treatment intent and expected duration of treatment three months.   · 4/12/18 - CT chest, abdomen and pelvis with contrast revealed numerous tiny centrilobular nodules in the lungs favored to reflect infectious or inflammatory process. Nodules ranged 1-3 mm in size and are new from prior studies with metastatic disease not entirely excluded. Stable small hiatal hernia. Interval progression of metastatic disease involving the subcutaneous tissues at the ventral abdominal wall. Multiple soft tissue density nodules previously shown to be hypermetabolic on PET scan. New small crescent of low attenuation material along the periphery of the spleen with similar finding at the dome of the liver. Findings are concerning for peritoneal metastases. Density of the dome of the liver remained unchanged from multiple prior studies. Stable calcified mass in  the left pelvic sidewall. Urinary bladder wall thickening.   · 4/23/18 - Cycle 1 chemotherapy with Carboplatin administered along with Neulasta injection.   · 4/26/18 - Neulasta discontinued due to insurance denial.   · 5/14/18 - Patient received cycle 2 Carboplatin.   · 6/5/18 - Cycle 3 day 1 chemotherapy with Carboplatin administered.   · 6/20/18 - CT chest, abdomen and pelvis at [facility] showed re-demonstration of soft tissue mass in the ventral wall hernia left of the midline as well as the dome of the liver, likely representing metastatic disease similar as compared to the prior study. Additional calcified lesions present in the upper left hemipelvis and along the anterior abdominal wall to the right of the midline also likely representing metastatic disease. No definite new lesions were identified. Moderate to large stool burden likely related to mild constipation was present. No suspicious lung nodules were identified. The previously-described ground glass nodules appeared to have resolved. There was a stable subpleural nodule in the right upper lobe measuring 3 mm present since 2014 without significant changes.   · 6/26/18 - Cycle 4 day 1 Carboplatin administered with addition of Neulasta for febrile neutropenia prophylaxis.   · 6/29/18 - Reviewed CT scans with patient. Orders written to discontinue Carboplatin and Neulasta and plan to start Niraparib 300 mg by mouth daily as maintenance therapy. Prescribed Amlodipine 2.5 mg p.o. daily for chemo-induced hypertension.   · 7/18/18 - Orders written to increase weekly Magnesium Sulfate to 3 g IV weekly due to continued hypomagnesemia.   · 8/1/18 - Patient reports she has not received Niraparib (Zejula) to date.   · 8/30/18 - Patient’s phone was not working so though Niraparib approved, the drug company had not been able to get ahold of her. Patient supplied with drug company number so that she can start taking the pills.   · 9/6/18 -  of 20 (N).    · 9/18/18 - Urinalysis done for dysuria and back pain. Urine culture grew 20,000 to 50,000 colonies of urogenital ruy. Prescribed Bactrim DS one tablet twice daily for one week.   · September 2018 - Patient initiated on Zejula 300 mg p.o. daily.   · 10/1/18 - WBC 3.5, hemoglobin 12, platelet count 74,000. Niraparib (Zejula) dose held and then resumed when platelets above 100,000 at a dose of 200 mg daily.   · 10/15/18 - Patient received Niraparib (Zejula) at 200 mg p.o. daily with a platelet count of 198,000.   · 11/7/18 - WBC 6, hemoglobin 11.6, MCV 96.2, platelet count 137,000. Patient claims to have stopped taking Niraparib a few days prior because of cough. Asked to resume taking it. Ciprofloxacin 500 mg p.o. twice daily for one week for bronchitis.   · 12/3/18 - Magnesium Sulfate infusions changed to every two weeks, to send mag level before and give 3 grams. DC’d Magnesium Oxide and started Magnesium Plus Protein two pills twice daily.   · 1/4/19 - CT chest, abdomen and pelvis with new patchy nodular areas of airspace consolidation within the bilateral upper lobes, left greater than right, favored to be infectious or inflammatory. Interval enlargement of the peritoneal soft tissue masses within the pelvis compatible with progression of disease. Enlarging ventral hernia now containing multiple loops of small bowel and colon.   · 1/7/19 - Patient has not been coming in for weekly magnesium replacement as nursing has not been able to get ahold of her. Results of CT’s discussed with patient. Decision made to change her to IV chemotherapy with Carboplatin AUC-5 day 1 and pegylated liposomal Doxorubicin (Doxil) 30 mg/M2 IV day 1 to cycle every four weeks. Patient made aware of the limited choices of her treatment available.   · 1/31/19 - Echocardiogram showed LVEF 50-55% and otherwise normal echo Doppler study.   · 2/4/19 - New chemotherapy not initiated to date with difficulty contacting patient for  echocardiogram. Neulasta On-Pro 6 mg ordered with chemotherapy due to extensive prior exposure to chemotherapy, increasing risk of febrile neutropenia, history of neutropenic events on prior chemotherapy regimens.   · 2/15/19 - Patient started cycle 1 chemotherapy with Neulasta support.   · 3/6/19 -  of 28 (0-35).   · 3/15/19 - Cycle 2 Carboplatin with Liposomal Doxorubicin (Doxil) and Neulasta support initiated.     · 4/10/19 - Patient was requested to followup with Dr. Queen regarding hypotension and blood pressure medication dosing. Patient appears to be tolerating treatment well with cytopenias not requiring dose adjustments. No Doxil-related skin or mucus membrane toxicities or other significant toxicities to date.   · 4/12/19 - Cycle 3 chemotherapy given.   · 4/16/19 - CT chest, abdomen and pelvis with no evidence of active metastasis to the chest. Several tree-in-bud nodules within the periphery of the inferior right upper lobe likely infectious or inflammatory. Disease burden within abdomen and pelvis stable to slightly increased from prior CT. Specifically, a soft tissue mass along the right rectus muscle slightly increased in bulk. Multiple ventral hernias, some containing loops of bowel, with no evidence of acute bowel obstruction.   · 5/8/19 - Discussed CT results with patient. Overall stable disease and would like to continue same treatment. Dove soap and Hydrocortisone Cream p.r.n. prescribed for scattered rash on back.   · 5/10/19 - Cycle 4 Carboplatin and Liposomal Doxorubicin initiated.   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 It was ER positive, HER2/zuleima negative, PD-L1 negative. There was TOPO1 positivity and TUBB3 positivity. TMB was low (two mutations per megabyte MUTS/MB) and MSI was stable. There were 13 therapies considered with increased benefit. The 13 therapies with increased benefit included Fulvestrant,  Irinotecan, Letrozole, Topotecan, Anastrazole, Exemestane, Tamoxifen, all of which were referenced to [facility] as well as Abemaciclib, Everolimus, Gefitinib, Palbociclib, Ribociclib and Toremifene.     · 6/5/19 echocardiogram with preserved LV systolic function with EF around 60%.  · 6/7/19 cycle 5 chemotherapy with Neulasta support given.  Patient continued on mag oxide 400 mg p.o. twice daily and weekly IV 3 g mag sulfate infusions for persistent hypomagnesemia.  · 7/5/2019- cycle 6 chemotherapy with carboplatin and doxorubicin with Neulasta port initiated.  Ca125:  21 (0-35).  · 7/23/2019-CT chest abdomen and pelvis compared to CT from 4/16/2019 revealed multiple small pulmonary nodules unchanged from prior examination within the right upper and left lower lobes.  Complex ventral hernia similar to prior exam.  Soft tissue nodule along the right lateral margin of the right of the midline measuring 12 x 10 mm unchanged in size.  Irregular soft tissue nodular thickening along the margin of the most inferior aspect of the complex ventral hernia with thickness measuring up to 13 mm similar to prior examination.  Extensive calcified and soft tissue mass within the left lower quadrant decrease in size now measuring 2.6 x 2.3 cm previously 3.0 x 2.5 cm.  Partially calcified mass involving the right rectus sheath measuring 3.1 x 2.7 cm previously measured 3.3 x 2.9 cm.  Densely calcified mass measuring 2.1 x 1.6 cm unchanged previously measured 2 x 1.7 cm.  Right external iliac chain lymph node measuring 9 mm previously measured 5 mm.  · 8/2/2019 cycle 7 chemotherapy given.  · 8/30/2019-cycle 8 chemotherapy with carboplatin and doxorubicin with Neulasta support given.  · 9/27/2019 cycle 9 chemotherapy given.  · 10/1/19 - Echocardiogram showed Normal LV size and contractility EF of 60-65%. Normal RV size. Borderline left atrial enlargement. Aortic valve, mitral valve, tricuspid valve appears structurally normal, no significant  regurgitation seen.No pericardial effusion seen. Proximal aorta appears normal in size.  · 10/18/19 - Magnesium 1.5 (1.8-2.5).  IV magnesium replacement continued.  · 10/25/2019 cycle 10 chemotherapy given.  · 10/29/2019 patient had CT scan of the chest abdomen and pelvis-there is a new 2 mm nodule in the left lower lobe with a stable 2 mm nodule in the left lower lobe.  Follow-up in 6 months was recommended.  Stable multiple soft tissue density and calcified nodules within the ventral abdominal wall and left retroperitoneal fat consistent with nonviable metastatic disease.  These nodules have either decreased in size or stable.  · 11/22/2019 10 received cycle 11 of chemotherapy with Doxil and carboplatinum with Neulasta  · 12/8/2019 to 12/11/2019 patient was admitted to the hospital for neutropenic fever.  · 12/20/2019 patient received cycle 12 of chemotherapy with Doxil and carboplatinum with Neulasta  · 1/13/20: 2 D echo was normal with EF 56% to 60%  · 1/24/20-Patient received cycle 13 of combination chemotherapy with carboplatinum and Doxil  · 2/3/2020 patient had a  which was 25.4  · 2/21/2020-patient received cycle 14 of chemotherapy with carboplatinum and Doxil  · 3/6/2020 patient had CT scan of the chest, abdomen and pelvis this revealed a 3 mm nodule in the left lower lobe present on prior CT of the abdomen unchanged from July 2019.  Stable 3 mm right upper lobe nodule.  There is a lymph node in the AP window measuring 8 x 9 mm prominent left hilar lymph node measuring 12 mm previously was 7 x 7 mm no significant adenopathy was seen in the abdomen there is an area of irregular soft tissue in the left paramidline ventral hernia which is stable measuring 5.7 cm partially calcified mass in the right rectus measuring 3 x 2.5 cm which is also stable the prominent lymph nodes in the left mid hilum and mediastinum may be reactive or metastatic disease  · 4/17/2020-patient had cycle 15 of single agent  carboplatinum  · 5/26/2020 patient had CT scan of the chest, abdomen and pelvis showed 3 new lung nodules measuring 7 mm in the left upper lobe, 5 mm in the left lower lobe and 4 mm in the right lower lobe.  There were additional small lung nodules which were unchanged.  Mildly prominent mediastinal and bilateral hilar lymph nodes mildly increased in size.  Right hilar node 9 mm previously was 5 mm.  Carinal node 9 mm previously was 6 mm.  The nodule at the right side of the hernia sac has increased to 1.5 cm from 1.2 cm.  Also a nodule in the subcutaneous fat currently measures 2.4 was previously 2 cm.  · 5/15/2020-patient received cycle 16 of carboplatinum  · Since the last visit in the office patient patient developed dizzy spell and fell. For that reason she was taken to the emergency room at [place].    · 7/3/2020 she had brain MRI which showed an 8 mm focus of abnormality in the left aspect of the posterior fossa corresponding to a dural based enhancing lesion thought to represent a calcified meningioma vessels calcified dural based metastasis.  This lesion has a slight local mass-effect and a small amount of surrounding edema.  There is also a 4 to 5 mm rounded focus of abnormal nodular enhancement along the cortex of the left parietal lobe but no significant mass-effect.  This is nonspecific could represent neoplastic process, infectious/inflammatory process or granulomatous disease.  · Patient was seen by neurosurgery, follow-up brain MRI in 8 weeks has been recommended by Dr. Hartman       2. Deep vein thrombosis of left upper extremity diagnosis established in October of 2013.  · 10/16/13 - Venous Doppler of left upper extremity.  Impression:  Deep vein thrombosis involving the subclavian, axillary and brachial veins on the left side, superficial vein thrombosis involving the left basilic vein.  · 10/16/13 - Order written for Lovenox 120 mg subcutaneously daily until INR is in therapeutic range.  Order written  for Coumadin 5 mg p.o. daily.  The patient is to follow PT and INR protocol.  · 5/20/16 - Venous Doppler bilateral lower extremities normal.  · 7/30/19 - Patient continued on Coumadin following the INR protocol.      3. Anemia diagnosis established in November 2013 and pernicious anemia diagnosis established March 2016.   · 11/15/13 - Hemoglobin is 7.8.  · 12/2/13 - Orders written to start Aranesp 200 mg subcutaneous every two weeks due to low hemoglobin secondary to chemo induced anemia.  Hemoglobin is 9.7.  Anemia workup is ordered.  · 12/03/13 - Ferritin 179.8 (N), folic acid 8.3 (N), vitamin B12 314, iron 104 (N), TIBC 298 (N), iron saturation 35 (N), Reticulocyte count 0.88 (N).  EPO level 124 (N).  Haptoglobin 22 (H).  · 10/22/14 - Hemoglobin 12.5, hematocrit 37.3, MCV 91.6.  · 10/23/14 - Anemia resolved.   · 3/8/16 - WBC 5.8, hemoglobin 11.7, MCV 90.3, platelet count 245,000. Vitamin B12 of 189 (180-914), ferritin 61 (), iron 91 (), TIBC 345 (228-428).   · 3/15/16 -  ().        · 3/22/16 - Intrinsic factor antibody positive, antiparietal antibody negative. Vitamin B12 at 1000 mcg IM weekly started, but the patient after receiving first dose on 3/22/16 did not come back for injections until 4/13/16.   · 4/13/16 - WBC 5.1, hemoglobin 12.5, MCV 87.9, platelet count 201,000. Patient given B12 injection and then continued at home.   · 5/10/16 - WBC 5.1, hemoglobin 12.5, platelet count 201,000.   · 6/14/16 - Monthly Vitamin B12 injections continued at home.   · 7/11/16 - WBC 5.48, hemoglobin 12.7, MCV 86.2, platelet count 200,000.   · 8/16/16 - Patient continued on monthly Vitamin B12 injections at home.   · 1/5/17 - WBC 2.6 with 38% neutrophils, 56% lymphocytes, 5% monocytes, hemoglobin 10.5, .3, platelet count 63,000. Patient continued on Vitamin B12 injections 1000 mcg IM monthly at home.   · 3/20/17 - Retic 0.41 (0.5-1.5), creatinine 1.0 (0.4-1.0). Iron 12 (), TIBC 270  (228-428), ferritin 259 (), haptoglobin 340 (), TSH 0.43 (0.34-5.6), folate 6.0 (5.9-24.8). Serum protein electrophoresis revealed decreased gammaglobulins. Serum EDU had no monoclonal gammopathy identified. Stool Hemoccult was negative.   · 6/8/17 - WBC 6.3, hemoglobin 8.3, MCV 92.4, platelet count 50,000. Procrit 40,000 units subq weekly added for chemotherapy-induced anemia. Patient continued on Vitamin B12 at 1000 mcg IM monthly at home.   · 7/5/17 - Iron 33 (), TIBC 328 (228-428), ferritin 211 (), TSH 2.46 (0.34-5.6).       · 7/31/17 - WBC 5.0, hemoglobin 11.2, MCV 89.7, platelet count 217,000. We will continue with the Procrit 40,000 units subq weekly for chemo-induced anemia when the hemoglobin falls below 10.0 and the patient is also to continue with the Vitamin B12 at 1000 mcg IM monthly at home. Creatinine 1.24 (0.5-0.99).    · 8/28/17 - WBC 9.6, hemoglobin 8.7, MCV 93.7, platelet count 133,000. Patient is to continue with the Procrit 40,000 units subq weekly and will receive that today. She is also to continue with the Vitamin B12 at 1000 mcg IM monthly at home.  · 10/16/17 - WBC 7.5, hemoglobin 9.6, MCV 98.4, platelet count 195,000. Patient is to continue with Procrit 40,000 units subq weekly and will receive Procrit today.   · 1/1/18 - Creatinine 1.3 (0.4-1.0).    · 2/19/18 - Procrit given for hemoglobin 9.9.   · 2/26/18 - Continue Procrit 40,000 units weekly p.r.n. hemoglobin <10. Continue Vitamin B12 at 1000 mcg IM monthly at home.   · 3/26/18 - Hemoglobin 8.3. Procrit dose increased to 60,000 units weekly. Iron 29 (), TIBC 306 (228-428), and iron sat 9% (15-50). Iron 29 (), TIBC 306 (228-428), iron saturation 9% (15-50).   · 3/27/18 - Order written to start Ferrous sulfate 325 mg p.o. b.i.d.    · 4/2/18 - Stool heme negative x3.   · 4/30/18 - Patient had not started Ferrous sulfate 325 mg p.o. b.i.d. and verbalized understanding to do so and to notify the office if  side effects are not tolerable.   · 5/24/18 - Patient was hospitalized at [facility] between 5/24/18 and 5/26/18 with dark tarry stool. INR was subtherapeutic. She was given IV Protonix and Protonix 40 mg daily. She underwent an EGD by David Rogers M.D. revealing small hiatal hernia, small submucosal nodule near the cardia, erosive gastritis. Pathology on gastric antrum and body biopsy revealed iron pill gastritis and no evidence of Helicobacter pylori. She then underwent a colonoscopy revealing diverticulosis, hemorrhoids and surgical changes from previous resection. It was recommended to consider outpatient M2A if hemoglobin continued to drop.   · 6/4/18 - Order written to discontinue iron pills and to use Injectafer 750 mg IV days 1 and 8 for low iron sats. Order written for Procrit 40,000 units subq weekly. Iron 65 (), TIBC 328 (228-428), iron saturation 20% (15-50), ferritin 123 ().   · 6/29/18 - Discussed patient’s intolerance of oral iron due to gastritis. Oral iron discontinued with plans for Injectafer should iron level drop in the future.   · 11/7/18 - Patient claims not to be taking Vitamin B12 injections at home. Asked to resume those.   · 12/3/18 - Patient reports she has not been taking her B12 injections for a couple of months. She needs some syringes and needles for that.   · 2/4/19 - Patient is uncertain if she is currently taking Ferrous Sulfate or not. Patient with history of intolerance and will follow hemoglobin.   · 5/8/19 - Ferritin 64 ().  · 8/27/2019- iron 52 (), iron saturation 14% (15-50), TIBC 364 (228-420), and ferritin 74 ().  Injectafer 750 mg IV day 1 and 8 due to oral iron intolerance ordered.  · 8/30/2019- Injectafer 750 mg IV day 1 given.  Hemoglobin 10.8.  At the end of the infusion heart rate was 48 and the patient reported mild dizziness.  Patient was sent to the ED for evaluation with symptoms resolving rapidly.  Chest x-ray and CT head were  negative for acute findings.  · 9/6/2019-Injectafer 750 mg IV day 8 given without adverse effects.  Hemoglobin 9.8.   · 9/25/19 - Iron 85 (). Iron saturation 25 (15-50). TIBC 335 (228-428). Ferritin  694 ().  Hemoglobin 10.3.  · 10/25/2019 hemoglobin 9.7.  Patient started on Retacrit 40,000 units subcu weekly.    Past Medical History:   Diagnosis Date   • Anemia 2013   • Cervical disc disorder 2013    Herniated disc, pinched nerve   • Clotting disorder (CMS/HCC) 2013    Low platelets from chemo   • Colon polyp 2013   • Deep vein thrombosis (CMS/HCC) 2013   • Diverticulosis 2013   • GERD (gastroesophageal reflux disease) 2016   • HL (hearing loss) 2016    I need hearing aids   • Hyperlipidemia 2013    Need my cholesterol rechecked   • Hypertension    • Joint pain 2013    Shoulder pain, torn rotator cuff   • Low back pain 2013   • Neuromuscular disorder (CMS/[facility]) 2015    Shingles, pinched nerves in my neck   • Osteopenia 2014   • Osteoporosis    • Ovarian cancer (CMS/[facility])    • Pneumonia 2010    Have had it several times   • Spinal stenosis 2013    Cervical   • Urinary tract infection 2013    Have had several utis       Past Surgical History:   Procedure Laterality Date   • COLON SURGERY     • COLONOSCOPY  05/26/2018   • EXPLORATORY LAPAROTOMY     • HERNIA REPAIR     • HYSTERECTOMY     • OOPHORECTOMY           Current Outpatient Medications:   •  acetaminophen (TYLENOL) 500 MG tablet, Take 1,000 mg by mouth Every 6 (Six) Hours As Needed for Mild Pain ., Disp: , Rfl:   •  albuterol sulfate  (90 Base) MCG/ACT inhaler, Inhale 2 puffs Every 6 (Six) Hours As Needed for Wheezing., Disp: 6.7 g, Rfl: 0  •  atorvastatin (LIPITOR) 20 MG tablet, Take 20 mg by mouth every night at bedtime., Disp: , Rfl: 3  •  cyanocobalamin 1000 MCG/ML injection, Inject 1,000 mcg into the appropriate muscle as directed by prescriber Every 28 (Twenty-Eight) Days., Disp: , Rfl:   •  famotidine (PEPCID) 40 MG tablet, TAKE 1 TABLET  "BY MOUTH EVERY MORNING BEFORE BREAKFAST, Disp: 90 tablet, Rfl: 1  •  KLOR-CON 20 MEQ CR tablet, Take 3 tablets by mouth every morning and take 2 tablets by mouth at bedtime., Disp: 150 tablet, Rfl: 5  •  magnesium oxide (MAG-OX) 400 MG tablet, TAKE 1 TABLET BY MOUTH TWICE A DAY, Disp: 180 tablet, Rfl: 1  •  methylPREDNISolone (MEDROL, JONES,) 4 MG tablet, Take as directed on package instructions., Disp: 1 each, Rfl: 0  •  ondansetron ODT (ZOFRAN-ODT) 8 MG disintegrating tablet, Take 8 mg by mouth Every 8 (Eight) Hours As Needed for Nausea or Vomiting., Disp: , Rfl:   •  oxyCODONE (ROXICODONE) 10 MG tablet, Take 1 tablet by mouth 3 (Three) Times a Day As Needed for Moderate Pain ., Disp: 90 tablet, Rfl: 0  •  pregabalin (LYRICA) 100 MG capsule, Take 1 capsule by mouth 3 (Three) Times a Day., Disp: 90 capsule, Rfl: 1  •  sertraline (ZOLOFT) 50 MG tablet, TAKE 1 TABLET BY MOUTH EVERY EVENING BEFORE BED, Disp: 90 tablet, Rfl: 1  •  Syringe 23G X 1\" 3 ML misc, INJECT 1 ML B-12 ONCE MONTHLY, Disp: 1 each, Rfl: 3  •  temazepam (RESTORIL) 30 MG capsule, TAKE 1 CAPSULE BY MOUTH AT NIGHT AS NEEDED FOR SLEEP., Disp: 30 capsule, Rfl: 1  •  triamterene-hydrochlorothiazide (DYAZIDE) 37.5-25 MG per capsule, TAKE 1 CAPSULE BY MOUTH EVERY DAY, Disp: 90 capsule, Rfl: 1  •  warfarin (COUMADIN) 1 MG tablet, TAKE 1 TABLET BY MOUTH EVERY DAY AS DIRECTED, Disp: 45 tablet, Rfl: 2  •  warfarin (COUMADIN) 5 MG tablet, Active thrombosis  Indications: DVT/PE (active thrombosis) (Patient taking differently: Active thrombosis  Indications: DVT/PE (active thrombosis), On hold due to surgery), Disp: 30 tablet, Rfl: 0    Allergies   Allergen Reactions   • Morphine Nausea Only and Hives   • Penicillin V Potassium Rash   • Sulfa Antibiotics Rash   • Levaquin [Levofloxacin] Nausea Only and Dizziness     When taken with Coumadin   • Amoxicillin Rash   • Norco [Hydrocodone-Acetaminophen] Rash       Family History   Problem Relation Age of Onset   • " "Hypertension Mother    • Cancer Father    • Hypertension Father    • Hypertension Sister    • Hypertension Brother    • Stroke Brother        Cancer-related family history includes Cancer in her father.    Social History     Tobacco Use   • Smoking status: Never Smoker   • Smokeless tobacco: Never Used   Substance Use Topics   • Alcohol use: No     Frequency: Never     Comment: caffeine 32-64oz qd   • Drug use: No       I have reviewed and confirmed the accuracy of the patient's history: Chief complaint, HPI and ROS as entered by the MA/LPN/RN. Cindy Ball MD 07/07/20     SUBJECTIVE:    The patient is here for a follow up appointment.  Patient is here today for routine follow-up.  Patient no longer feels dizzy.  She denies headaches or blurry vision.  She also has canceled her cataract surgery due to recent events..      REVIEW OF SYSTEMS:  Review of Systems   Constitutional: Negative for chills and fever.   HENT: Negative for ear pain, mouth sores, nosebleeds and sore throat.    Eyes: Negative for photophobia and visual disturbance.   Respiratory: Negative for wheezing and stridor.    Cardiovascular: Negative for chest pain and palpitations.   Gastrointestinal: Negative for abdominal pain, diarrhea, nausea and vomiting.   Endocrine: Negative for cold intolerance and heat intolerance.   Genitourinary: Negative for dysuria and hematuria.   Musculoskeletal: Negative for joint swelling and neck stiffness.   Skin: Negative for color change.   Neurological: Negative for seizures and syncope.   Hematological: Negative for adenopathy.   Psychiatric/Behavioral: Negative for agitation, confusion and hallucinations.       OBJECTIVE:    Vitals:    07/07/20 1414   BP: 115/71   Pulse: 51   Resp: 16   Temp: 97.1 °F (36.2 °C)   Weight: 81.6 kg (180 lb)   Height: 165.1 cm (65\")   PainSc: 0-No pain       ECOG  (1) Restricted in physically strenuous activity, ambulatory and able to do work of light nature    Physical Exam "   Constitutional: She is oriented to person, place, and time. No distress.   HENT:   Head: Normocephalic and atraumatic.   Eyes: Conjunctivae and EOM are normal. Right eye exhibits no discharge. Left eye exhibits no discharge. No scleral icterus.   Neck: Normal range of motion. Neck supple. No thyromegaly present.   Cardiovascular: Normal rate, regular rhythm and normal heart sounds. Exam reveals no gallop and no friction rub.   Pulmonary/Chest: Effort normal. No stridor. No respiratory distress. She has no wheezes.   Abdominal: Soft. Bowel sounds are normal. She exhibits no mass. There is no tenderness. There is no rebound and no guarding.   Musculoskeletal: Normal range of motion. She exhibits no tenderness.   Lymphadenopathy:     She has no cervical adenopathy.   Neurological: She is alert and oriented to person, place, and time. She exhibits normal muscle tone.   Skin: Skin is warm. She is not diaphoretic. No erythema.   Psychiatric: She has a normal mood and affect. Her behavior is normal. Judgment and thought content normal.   Nursing note and vitals reviewed.        RECENT LABS  WBC   Date Value Ref Range Status   07/07/2020 4.08 3.40 - 10.80 10*3/mm3 Final   07/05/2020 4.04 (L) 4.5 - 11.0 10*3/uL Final     RBC   Date Value Ref Range Status   07/07/2020 3.41 (L) 3.77 - 5.28 10*6/mm3 Final   07/05/2020 3.68 (L) 4.0 - 5.2 10*6/uL Final     Hemoglobin   Date Value Ref Range Status   07/07/2020 10.8 (L) 12.0 - 15.9 g/dL Final   07/05/2020 11.5 (L) 12.0 - 16.0 g/dL Final     Hematocrit   Date Value Ref Range Status   07/07/2020 33.8 (L) 34.0 - 46.6 % Final   07/05/2020 35.3 (L) 36.0 - 46.0 % Final     MCV   Date Value Ref Range Status   07/07/2020 99.1 (H) 79.0 - 97.0 fL Final   07/05/2020 95.9 80.0 - 100.0 fL Final     MCH   Date Value Ref Range Status   07/07/2020 31.7 26.6 - 33.0 pg Final   07/05/2020 31.3 26.0 - 34.0 pg Final     [facility]   Date Value Ref Range Status   07/07/2020 32.0 31.5 - 35.7 g/dL Final    07/05/2020 32.6 31.0 - 37.0 g/dL Final     RDW   Date Value Ref Range Status   07/07/2020 15.6 (H) 12.3 - 15.4 % Final   07/05/2020 15.9 12.0 - 16.8 % Final     RDW-SD   Date Value Ref Range Status   07/07/2020 54.9 (H) 37.0 - 54.0 fl Final     MPV   Date Value Ref Range Status   07/07/2020 10.9 6.0 - 12.0 fL Final   07/05/2020 10.2 6.7 - 10.8 fL Final     Platelets   Date Value Ref Range Status   07/07/2020 150 140 - 450 10*3/mm3 Final   07/05/2020 158 140 - 440 10*3/uL Final     Neutrophil Rel %   Date Value Ref Range Status   07/05/2020 54.0 45 - 80 % Final     Neutrophil %   Date Value Ref Range Status   07/07/2020 53.5 42.7 - 76.0 % Final     Lymphocyte Rel %   Date Value Ref Range Status   07/05/2020 30.4 15 - 50 % Final     Lymphocyte %   Date Value Ref Range Status   07/07/2020 29.7 19.6 - 45.3 % Final     Monocyte Rel %   Date Value Ref Range Status   07/05/2020 12.4 0 - 15 % Final     Monocyte %   Date Value Ref Range Status   07/07/2020 13.7 (H) 5.0 - 12.0 % Final     Eosinophil %   Date Value Ref Range Status   07/07/2020 2.9 0.3 - 6.2 % Final   07/05/2020 3.0 0 - 7 % Final     Basophil Rel %   Date Value Ref Range Status   07/05/2020 0.2 0 - 2 % Final     Basophil %   Date Value Ref Range Status   07/07/2020 0.2 0.0 - 1.5 % Final     Immature Grans %   Date Value Ref Range Status   07/05/2020 0.0 0 % Final     Neutrophils Absolute   Date Value Ref Range Status   07/05/2020 2.18 2.0 - 8.8 10*3/uL Final     Neutrophils, Absolute   Date Value Ref Range Status   07/07/2020 2.18 1.70 - 7.00 10*3/mm3 Final     Lymphocytes Absolute   Date Value Ref Range Status   07/05/2020 1.23 0.7 - 5.5 10*3/uL Final     Lymphocytes, Absolute   Date Value Ref Range Status   07/07/2020 1.21 0.70 - 3.10 10*3/mm3 Final     Monocytes Absolute   Date Value Ref Range Status   07/05/2020 0.50 0.0 - 1.7 10*3/uL Final     Monocytes, Absolute   Date Value Ref Range Status   07/07/2020 0.56 0.10 - 0.90 10*3/mm3 Final     Eosinophils  Absolute   Date Value Ref Range Status   07/05/2020 0.12 0.0 - 0.8 10*3/uL Final     Eosinophils, Absolute   Date Value Ref Range Status   07/07/2020 0.12 0.00 - 0.40 10*3/mm3 Final     Basophils Absolute   Date Value Ref Range Status   07/05/2020 0.01 0.0 - 0.2 10*3/uL Final     Basophils, Absolute   Date Value Ref Range Status   07/07/2020 0.01 0.00 - 0.20 10*3/mm3 Final     Immature Grans, Absolute   Date Value Ref Range Status   07/05/2020 0.00 <1 10*3/uL Final     nRBC   Date Value Ref Range Status   07/05/2020 0 0 /100(WBC) Final   12/11/2019 0.3 (H) 0.0 - 0.2 /100 WBC Final       Lab Results   Component Value Date    GLUCOSE 78 06/19/2020    BUN 15 07/05/2020    CREATININE 0.7 07/05/2020    EGFRIFNONA 78 06/19/2020    EGFRIFAFRI >60 06/07/2019    BCR 21.7 07/05/2020    K 3.9 07/05/2020    CO2 26 07/05/2020    CALCIUM 9.4 07/05/2020    ALBUMIN 3.7 07/03/2020    LABIL2 1.3 07/03/2020    AST 32 07/03/2020    ALT 16 07/03/2020         Assessment/Plan     There are no diagnoses linked to this encounter.    ASSESSMENT:    1. Recurrent ovarian cancer on carboplatinum, Doxil.  Patient had had carboplatinum Taxol, carbo Taxotere, Gemzar single agent, Niraparib and was on Doxil and carboplatinum.  Doxil was discontinued due to approaching of lifetime dosing.  Refer to paradigm testing for future options at time of progression.  Patient has had progression of disease and therefore will platinum was discontinued.  Topotecan single agent has been recommended  2. Nonspecific lesions in the brain concern for possible small metastases 4 to 5 mm on the left parietal lobe, 8 mm nodular density in the dura on the left posterior fossa may be meningioma or calcified metastasis: Neurosurgery following Dr. Hartman.  Repeat brain MRI in 8 weeks  3. Iron deficiency anemia: Intermittently on  iron  4. Pancytopenia: Due to chemotherapy: On Procrit  5. Hypomagnesemia: Continue to monitor levels  6. B12 deficiency on parenteral B12  injections  7. Mucositis due to chemotherapy: resolved  8. Rash on face likely drug induced  9. Tumor progression  10. Monitoring for chemotherapy toxicities  11. Follow-up posthospitalization      DISCUSSION:    I have reviewed her restaging CT scans.  Patient has new lung nodules and also enlarging nodules in the abdomen and pelvis consistent with progressing disease.  For that reason we will discontinue single agent carboplatinum and transition patient to topotecan.  Also note patient was on carboplatinum every 4 weeks with Doxil.  Doxil was discontinued due to approaching lifetime maximum dosing.  In the future carboplatinum may be reused but at 3 weekly intervals.    Discussed side effects of chemotherapy to include but not limited to  Chemotherapy side effects include, but not limited to, nausea, vomiting, bone marrow suppression, which can result in blood, platelet transfusion. There is also risk of permanent bone marrow destruction, which can cause myelodysplastic side effects of her new chemotherapy or leukemia years down the line. There is risk of infection which can result in hospitalization and even death. There is also risk of fatigue, asthenia, alopecia which could become permanent. Chemo will help to reduce risk of relapse of cancer, but does not eliminate risk completely.    Specifically topotecan can lead to severe bone marrow suppression which may result in platelet transfusions    PLANS:       Refer to Dr. Delong for possible brain metastasis  Brain MRI follow-up in 6 to 8 weeks: Dr Hartman Neurosurgery  Discontinue carboplatinum due to progressive disease  Begin single agent Topotecan: Plan to begin this week   monthly: Check levels today.  Ongoing  Continue magnesium oxide 400 mg three times a day and weekly IV replacement as needed.   Continue Coumadin  Continue b12 injections IM monthly. Transitioned injections to be done at the cancer center  Continue Retacrit 40,000 units subcu weekly for  hemoglobin less than 10, For chemotherapy-induced anemia  Magic mouthwash PRN for mucositis  All questions answered to the best of my abilities         I have reviewed labs results, imaging, vitals, and medications with the patient today. Will follow up in 3 weeks  with me.    All questions answered to the best of my abilities    Patient verbalized understanding and is in agreement of the above plans.      I spent greater than 45 minutes in face-to-face time with the patient and 95% of that time were spent in counseling and coordination of care, also time was spent in discussing side effects of her new chemotherapy, including review of imaging and pathology; indications for treatment, goals of therapy, alternatives and risks - both common and rare - as well as surveillance and potential outcomes.

## 2020-07-09 ENCOUNTER — HOSPITAL ENCOUNTER (OUTPATIENT)
Dept: ONCOLOGY | Facility: HOSPITAL | Age: 65
Setting detail: INFUSION SERIES
Discharge: HOME | End: 2020-07-09

## 2020-07-09 ENCOUNTER — LAB (OUTPATIENT)
Dept: LAB | Facility: HOSPITAL | Age: 65
End: 2020-07-09

## 2020-07-09 ENCOUNTER — TELEPHONE (OUTPATIENT)
Dept: ONCOLOGY | Facility: CLINIC | Age: 65
End: 2020-07-09

## 2020-07-09 ENCOUNTER — OFFICE VISIT (OUTPATIENT)
Dept: ONCOLOGY | Facility: CLINIC | Age: 65
End: 2020-07-09

## 2020-07-09 ENCOUNTER — OFFICE VISIT (OUTPATIENT)
Dept: PAIN MEDICINE | Facility: CLINIC | Age: 65
End: 2020-07-09

## 2020-07-09 ENCOUNTER — DOCUMENTATION (OUTPATIENT)
Dept: ONCOLOGY | Facility: HOSPITAL | Age: 65
End: 2020-07-09

## 2020-07-09 VITALS
RESPIRATION RATE: 16 BRPM | HEART RATE: 70 BPM | WEIGHT: 179 LBS | BODY MASS INDEX: 29.82 KG/M2 | DIASTOLIC BLOOD PRESSURE: 73 MMHG | OXYGEN SATURATION: 97 % | SYSTOLIC BLOOD PRESSURE: 111 MMHG | HEIGHT: 65 IN | TEMPERATURE: 97.1 F

## 2020-07-09 VITALS
BODY MASS INDEX: 29.79 KG/M2 | OXYGEN SATURATION: 99 % | WEIGHT: 179 LBS | DIASTOLIC BLOOD PRESSURE: 79 MMHG | HEART RATE: 53 BPM | TEMPERATURE: 98.4 F | RESPIRATION RATE: 18 BRPM | SYSTOLIC BLOOD PRESSURE: 152 MMHG

## 2020-07-09 DIAGNOSIS — R19.7 DIARRHEA, UNSPECIFIED TYPE: Primary | ICD-10-CM

## 2020-07-09 DIAGNOSIS — R52 PAIN: ICD-10-CM

## 2020-07-09 DIAGNOSIS — Z95.828 PORT-A-CATH IN PLACE: ICD-10-CM

## 2020-07-09 DIAGNOSIS — M79.7 FIBROMYOSITIS: ICD-10-CM

## 2020-07-09 DIAGNOSIS — M47.812 OSTEOARTHRITIS OF CERVICAL SPINE WITHOUT MYELOPATHY: ICD-10-CM

## 2020-07-09 DIAGNOSIS — M79.604 LEG PAIN, BILATERAL: Primary | ICD-10-CM

## 2020-07-09 DIAGNOSIS — Z79.899 OTHER LONG TERM (CURRENT) DRUG THERAPY: ICD-10-CM

## 2020-07-09 DIAGNOSIS — Z79.01 LONG TERM (CURRENT) USE OF ANTICOAGULANTS: Primary | ICD-10-CM

## 2020-07-09 DIAGNOSIS — C56.1 MALIGNANT NEOPLASM OF RIGHT OVARY (HCC): ICD-10-CM

## 2020-07-09 DIAGNOSIS — G25.81 RESTLESS LEG SYNDROME: ICD-10-CM

## 2020-07-09 DIAGNOSIS — M79.7 FIBROMYALGIA: ICD-10-CM

## 2020-07-09 DIAGNOSIS — D51.0 PERNICIOUS ANEMIA: ICD-10-CM

## 2020-07-09 DIAGNOSIS — E83.42 HYPOMAGNESEMIA: ICD-10-CM

## 2020-07-09 DIAGNOSIS — M50.10 CERVICAL DISC DISORDER WITH RADICULOPATHY: ICD-10-CM

## 2020-07-09 DIAGNOSIS — I82.722 CHRONIC DEEP VEIN THROMBOSIS (DVT) OF LEFT UPPER EXTREMITY, UNSPECIFIED VEIN (HCC): Primary | ICD-10-CM

## 2020-07-09 DIAGNOSIS — M79.605 LEG PAIN, BILATERAL: Primary | ICD-10-CM

## 2020-07-09 LAB
ALBUMIN SERPL-MCNC: 4 G/DL (ref 3.5–5.2)
ALBUMIN/GLOB SERPL: 1.6 G/DL
ALP SERPL-CCNC: 126 U/L (ref 39–117)
ALT SERPL W P-5'-P-CCNC: 10 U/L (ref 1–33)
ANION GAP SERPL CALCULATED.3IONS-SCNC: 11 MMOL/L (ref 5–15)
AST SERPL-CCNC: 21 U/L (ref 1–32)
BASOPHILS # BLD AUTO: 0.01 10*3/MM3 (ref 0–0.2)
BASOPHILS NFR BLD AUTO: 0.2 % (ref 0–1.5)
BILIRUB SERPL-MCNC: 0.3 MG/DL (ref 0–1.2)
BUN SERPL-MCNC: 14 MG/DL (ref 8–23)
BUN SERPL-MCNC: ABNORMAL MG/DL
BUN/CREAT SERPL: ABNORMAL
CALCIUM SPEC-SCNC: 9.7 MG/DL (ref 8.6–10.5)
CANCER AG125 SERPL QL: 100.9 U/ML (ref 0–38.1)
CHLORIDE SERPL-SCNC: 102 MMOL/L (ref 98–107)
CO2 SERPL-SCNC: 28 MMOL/L (ref 22–29)
CREAT SERPL-MCNC: 0.87 MG/DL (ref 0.57–1)
DEPRECATED RDW RBC AUTO: 54.3 FL (ref 37–54)
EOSINOPHIL # BLD AUTO: 0.14 10*3/MM3 (ref 0–0.4)
EOSINOPHIL NFR BLD AUTO: 3.4 % (ref 0.3–6.2)
ERYTHROCYTE [DISTWIDTH] IN BLOOD BY AUTOMATED COUNT: 15.4 % (ref 12.3–15.4)
GFR SERPL CREATININE-BSD FRML MDRD: 66 ML/MIN/1.73
GLOBULIN UR ELPH-MCNC: 2.5 GM/DL
GLUCOSE SERPL-MCNC: 80 MG/DL (ref 65–99)
HCT VFR BLD AUTO: 34.4 % (ref 34–46.6)
HGB BLD-MCNC: 11 G/DL (ref 12–15.9)
INR PPP: 1.3
LYMPHOCYTES # BLD AUTO: 1.09 10*3/MM3 (ref 0.7–3.1)
LYMPHOCYTES NFR BLD AUTO: 26.3 % (ref 19.6–45.3)
MAGNESIUM SERPL-MCNC: 1.4 MG/DL (ref 1.6–2.4)
MCH RBC QN AUTO: 31.6 PG (ref 26.6–33)
MCHC RBC AUTO-ENTMCNC: 32 G/DL (ref 31.5–35.7)
MCV RBC AUTO: 98.9 FL (ref 79–97)
MONOCYTES # BLD AUTO: 0.61 10*3/MM3 (ref 0.1–0.9)
MONOCYTES NFR BLD AUTO: 14.7 % (ref 5–12)
NEUTROPHILS NFR BLD AUTO: 2.29 10*3/MM3 (ref 1.7–7)
NEUTROPHILS NFR BLD AUTO: 55.4 % (ref 42.7–76)
PLATELET # BLD AUTO: 156 10*3/MM3 (ref 140–450)
PMV BLD AUTO: 11.1 FL (ref 6–12)
POTASSIUM SERPL-SCNC: 3.8 MMOL/L (ref 3.5–5.2)
PROT SERPL-MCNC: 6.5 G/DL (ref 6–8.5)
RBC # BLD AUTO: 3.48 10*6/MM3 (ref 3.77–5.28)
SODIUM SERPL-SCNC: 141 MMOL/L (ref 136–145)
WBC # BLD AUTO: 4.14 10*3/MM3 (ref 3.4–10.8)

## 2020-07-09 PROCEDURE — 85610 PROTHROMBIN TIME: CPT | Performed by: NURSE PRACTITIONER

## 2020-07-09 PROCEDURE — 25010000002 TOPOTECAN 4 MG/4ML SOLUTION 4 ML VIAL: Performed by: INTERNAL MEDICINE

## 2020-07-09 PROCEDURE — 80053 COMPREHEN METABOLIC PANEL: CPT | Performed by: INTERNAL MEDICINE

## 2020-07-09 PROCEDURE — 25010000002 DEXAMETHASONE SODIUM PHOSPHATE 120 MG/30ML SOLUTION: Performed by: INTERNAL MEDICINE

## 2020-07-09 PROCEDURE — 25010000002 CYANOCOBALAMIN PER 1000 MCG: Performed by: NURSE PRACTITIONER

## 2020-07-09 PROCEDURE — 99213 OFFICE O/P EST LOW 20 MIN: CPT | Performed by: PHYSICAL MEDICINE & REHABILITATION

## 2020-07-09 PROCEDURE — 96413 CHEMO IV INFUSION 1 HR: CPT

## 2020-07-09 PROCEDURE — 85025 COMPLETE CBC W/AUTO DIFF WBC: CPT | Performed by: INTERNAL MEDICINE

## 2020-07-09 PROCEDURE — 25010000003 HEPARIN LOCK FLUSH PER 10 UNITS: Performed by: INTERNAL MEDICINE

## 2020-07-09 PROCEDURE — 36591 DRAW BLOOD OFF VENOUS DEVICE: CPT

## 2020-07-09 PROCEDURE — 96372 THER/PROPH/DIAG INJ SC/IM: CPT

## 2020-07-09 PROCEDURE — 86304 IMMUNOASSAY TUMOR CA 125: CPT | Performed by: INTERNAL MEDICINE

## 2020-07-09 PROCEDURE — G0463 HOSPITAL OUTPT CLINIC VISIT: HCPCS | Performed by: PHYSICAL MEDICINE & REHABILITATION

## 2020-07-09 PROCEDURE — 96367 TX/PROPH/DG ADDL SEQ IV INF: CPT

## 2020-07-09 PROCEDURE — 83735 ASSAY OF MAGNESIUM: CPT | Performed by: INTERNAL MEDICINE

## 2020-07-09 PROCEDURE — 99214 OFFICE O/P EST MOD 30 MIN: CPT | Performed by: NURSE PRACTITIONER

## 2020-07-09 RX ORDER — SODIUM CHLORIDE 0.9 % (FLUSH) 0.9 %
20 SYRINGE (ML) INJECTION AS NEEDED
Status: DISCONTINUED | OUTPATIENT
Start: 2020-07-09 | End: 2020-07-10 | Stop reason: HOSPADM

## 2020-07-09 RX ORDER — HEPARIN SODIUM (PORCINE) LOCK FLUSH IV SOLN 100 UNIT/ML 100 UNIT/ML
500 SOLUTION INTRAVENOUS AS NEEDED
Status: CANCELLED | OUTPATIENT
Start: 2020-07-09

## 2020-07-09 RX ORDER — OXYCODONE HYDROCHLORIDE 10 MG/1
10 TABLET ORAL 3 TIMES DAILY PRN
Qty: 90 TABLET | Refills: 0 | Status: SHIPPED | OUTPATIENT
Start: 2020-07-09 | End: 2020-07-09 | Stop reason: SDUPTHER

## 2020-07-09 RX ORDER — CYANOCOBALAMIN 1000 UG/ML
1000 INJECTION, SOLUTION INTRAMUSCULAR; SUBCUTANEOUS ONCE
Status: COMPLETED | OUTPATIENT
Start: 2020-07-09 | End: 2020-07-09

## 2020-07-09 RX ORDER — OXYCODONE HYDROCHLORIDE 10 MG/1
10 TABLET ORAL 3 TIMES DAILY PRN
Qty: 90 TABLET | Refills: 0 | Status: SHIPPED | OUTPATIENT
Start: 2020-07-09 | End: 2020-10-08 | Stop reason: SDUPTHER

## 2020-07-09 RX ORDER — PREGABALIN 100 MG/1
100 CAPSULE ORAL 3 TIMES DAILY
Qty: 90 CAPSULE | Refills: 2 | Status: SHIPPED | OUTPATIENT
Start: 2020-07-09 | End: 2020-10-08 | Stop reason: SDUPTHER

## 2020-07-09 RX ORDER — SODIUM CHLORIDE 0.9 % (FLUSH) 0.9 %
20 SYRINGE (ML) INJECTION AS NEEDED
Status: CANCELLED | OUTPATIENT
Start: 2020-07-09

## 2020-07-09 RX ORDER — LOPERAMIDE HYDROCHLORIDE 2 MG/1
2 TABLET ORAL 3 TIMES DAILY PRN
Qty: 60 TABLET | Refills: 2 | Status: SHIPPED | OUTPATIENT
Start: 2020-07-09 | End: 2021-01-13 | Stop reason: SDUPTHER

## 2020-07-09 RX ORDER — HEPARIN SODIUM (PORCINE) LOCK FLUSH IV SOLN 100 UNIT/ML 100 UNIT/ML
500 SOLUTION INTRAVENOUS AS NEEDED
Status: DISCONTINUED | OUTPATIENT
Start: 2020-07-09 | End: 2020-07-10 | Stop reason: HOSPADM

## 2020-07-09 RX ADMIN — HEPARIN 500 UNITS: 100 SYRINGE at 11:02

## 2020-07-09 RX ADMIN — CYANOCOBALAMIN 1000 MCG: 1000 INJECTION INTRAMUSCULAR; SUBCUTANEOUS at 11:05

## 2020-07-09 RX ADMIN — DEXAMETHASONE SODIUM PHOSPHATE 12 MG: 4 INJECTION, SOLUTION INTRA-ARTICULAR; INTRALESIONAL; INTRAMUSCULAR; INTRAVENOUS; SOFT TISSUE at 09:58

## 2020-07-09 RX ADMIN — Medication 20 ML: at 11:02

## 2020-07-09 RX ADMIN — SODIUM CHLORIDE 7.6 MG: 900 INJECTION, SOLUTION INTRAVENOUS at 10:26

## 2020-07-09 NOTE — PROGRESS NOTES
Education for Administration of Chemotherapy and/or Biotherapy     NAME: Tahira Zimmerman  : 1955  MRN: [id number]  DATE OF SERVICE: 2020  REASON FOR VISIT: PATIENT EDUCATION    Ms. Tahira Zimmerman is here today for education on her upcoming chemotherapy and/or biotherapy recommended for treatment of her disease.     I reviewed treatment options, obtained signed consent, and answered any questions she had regarding the administration of topotecan.     Tahira Zimmerman has already consulted with Cindy Ball MD  for the treatment of ovarian cancer.  The patient's oncologist has discussed the same treatment options with the patient and answered her questions prior to today's visit on 20.    TREATMENT GOALS:  The goal of the treatment is to:    [] Curative intent - intent is cure; cure implies patient survival will not be restricted by current cancer diagnosis   [] Control  - intent is to extend survival but not long enough to meet definition of cure for patient with that diagnosis   [x] Palliative - means treatment given in a non-curative setting to optimize symptom control, improve quality of life, and improve survival    This treatment has been explained to Tahira Zimmerman. Alternative methods of treatment, if any, have been explained to Tahira Zimmerman, as have the benefits and risks of each. Based on the physician's explanation of the benefits and risks of this treatment and any alternatives available, the patient agrees the potential benefits outweighs the potential risks involved. I have explained to the patient the most likely complications which might occur from this treatment. The patient understands along with the treatment additional medications may be necessary to lessen the side effects.     SIDE EFFECTS:  Possible side effects may include but are not limited to, any of the following, or a combination of the following:    [x]  Abdominal pain  [x]   Hypersensitivity reaction [x]  Rash   [x]  Allergic Reaction []  Hypertension []  Secondary malignancies   [x]  Anemia []  Hypertensive crisis  []  Sexual side effects    []  Anxiety []  Hypertriglyceridemia [x]  Shortness of breath   []  Back pain []  Hypoalbuminemia []  Skin changes   []  Body pain []  Hyponatremia  []  Somnolence   []  Blood clots (DVT/PE) []  Immune-mediated reaction []  Sore throat   []  Bleeding []  Infection  []  Swelling   [x]  Bone pain [x]  Infusion reaction  []  Taste changes   []  Bruising [x]  Injection site reaction  []  Temperature sensitivity   []  Cardiovascular events  []  Injection site ulceration [x]  Thrombocytopenia   []  Central neurotoxicity []  Insomnia []  Thyroid changes   []  Chest pain []  Itching []  Tinnitus   []  Chills []  Joint pain []  Upper respiratory tract infection    []  Confusion []  Kidney damage []  Visual changes   []  Congestive heart failure [x]  Leukopenia []  Vitlligo   []  Constipation []  LFT imbalances [x]  Vomiting   [x]  Cough []  Liver damage []  Watery eyes   []  Depression [x]  Loss of appetite [x]  Weakness   [x]  Diarrhea []  Low blood pressure []  Weight gain   [x]  Dizziness []  Lung damage [x]  Weight loss   []  Dry skin []  Menopausal symptoms []  Wound healing complication   []  Ecchymosis []  Menstrual irregularities []  Other   []  Electrolyte imbalances []  Metallic taste  []  Other   []  Elevated LDH []  Mood changes []  Other   []  Eye irritation [x]  Mouth sores []  Other   [x]  Fatigue []  Muscle aches  []  Other   []  Fertility effects  []  Nephrotic syndrome []  Other   [x]  Fevers []  Nail changes []  Other   []  Fistula formation [x]  Nausea  []  Other   []  Flu-like symptoms []  Neck pain  []  Other   []  Fluid retention [x]  Neutropenia []  Other   []  Forgetfulness []  Nosebleeds []  Other   []  Gastrointestinal perforation []  Pain in arms/legs []  Other   []  Hand foot syndrome []  Pericardial effusion  []  Other   []   Hair loss/discoloration []  Peripheral neuropathy []  Other   [x]  Headaches [x]  Petechiae []  Other   []  Hearing loss/change []  Pharyngitis  []  Other   []  Heart damage []  Photosensitivity  []  Other   []  Hematuria []  Pleural effusion  []  Other   []  Hemorrhage []  Proteinuria  []  Other     VASCULAR ACCESS:  The patient was educated on the possible need for vascular access/port placement.  The patient was advised although uncommon, leakage of an infused medication from the vein or venous access device (port) may lead to skin breakdown and/or other tissue damage.  The patient was advised she may have pain, bleeding, and/or bruising from the insertion of a needle in their vein or venous access device (port).  The patient was further advised despite proper technique, infection with redness and irritation may rarely occur at the site where the needle was inserted.  The patient was advised if complications occur, additional medical treatment is available.    BLOOD COUNT MONITORING:  While receiving treatment, it has been explained to the patient blood counts will be monitored.  This may include but is not limited to a complete blood count (CBC). The patient may develop neutropenia, anemia, or thrombocytopenia. This has been explained and a handout was provided to the patient.     NUTRITION:   It was explained to the patient about nutrition and its importance while undergoing chemotherapy and/or biotherapy. Certain medications will be prescribed during the treatment which may change the way foods taste or smell. These changes may cause poor or no appetite. Food is fuel for thebody, and if it does not get the fuel it needs, she may become malnourished, which can lead to severe fatigue. It was discussed with the patient about calories and how to add high-calorie foods to her diet.  Protein was also mentioned in regards to how it will help make new cells for the body. Information was given to Tahira Zimmerman  regarding good protein sources.   It was also discussed with the patient the importance of  eating and drinking every 2-3 hours while awake. We discussed fluid intake of at least 6 to 8 ounce glasses of liquids per day to stay hydrated. Examples are listed below:   Water  Juice (fruit or vegetable)  Soda Sport Drinks Soup   Milk  Ensure, Boost, Glucerna Ice Cream Popsicles Jello   Milkshakes Pudding  Gatorade Sherbert Yogurt     REPRODUCTION:  Reproductive risks were discussed, including appropriate use of birth control, and protection during sexual relations. The risks of becoming pregnant while receiving chemotherapy and/or biotherapy were reviewed for females.  Males were instructed to use appropriate birth control to prevent conception during treatment.  We also discussed the importance of using reliable barrier methods while participating in intimate activities as this may expose their partners to a potentially harmful drug. The importance of pregnancy prevention was emphasized due to risks of increased chance of birth defects and miscarriages.     Tahira Zimmerman was provided handouts on:   1. Home instructions  2. Complete blood counts and terminology  3. Nutrition during cancer therapy   4. Fluids and dehydration  5. Mouth care  6. Cancer-related fatigue  7. Handouts from [employer] on Topotecan   8. A signed copy of chemotherapy/biotherapy consent     TOPICS EDUCATION PROVIDED EDUCATION REINFORCED COMMENTS   ANEMIA:  role of RBC, cause, s/s, ways to manage, role of transfusion [x] [x]    THROMBOCYTOPENIA:  role of platelet, cause, s/s, ways to prevent bleeding, things to avoid, when to seek help [x] [x]    NEUTROPENIA:  role of WBC, cause, infection precautions, s/s of infection, when to call MD [x] [x]    NUTRITION & APPETITE CHANGES:  importance of maintaining healthy diet & weight, ways to manage to improve intake, dietary consult, exercise regimen [x] [x]    DIARRHEA:  causes, s/s of dehydration,  ways to manage, dietary changes, when to call MD [x] [x]    CONSTIPATION:  causes, ways to manage, dietary changes, when to call MD [x] [x]    NAUSEA & VOMITING:  cause, use of antiemetics, dietary changes, when to call MD [x] [x]    MOUTH SORES:  causes, oral care, ways to manage [x] [x]    ALOPECIA:  cause, ways to manage, resources [x] [x]    INFERTILITY & SEXUALITY:  causes, fertility preservation options, sexuality changes, ways to manage, importance of birth control [x] [x]    NERVOUS SYSTEM CHANGES:  causes, s/s, neuropathies, cognitive changes, ways to manage [x] [x]    PAIN:  causes, ways to manage [x] [x]    SKIN & NAIL CHANGES:  cause, s/s, ways to manage [x] [x]    ORGAN TOXICITIES:  cause, s/s, need for diagnostic tests, labs, when to notify MD [x] [x]    SURVIVORSHIP:  distress, distress assessment, secondary malignancies, early/late effects, follow-up, social issues, social support [x] [x]    HOME CARE:  use of spill kits, storing of PO chemo, how to manage bodily fluids [x] [x]    MISCELLANEOUS:  drug interactions, administration, vesicant, etc, [x] [x]      PAST MEDICAL HISTORY:  Past Medical History:   Diagnosis Date   • Anemia 2013   • Cervical disc disorder 2013    Herniated disc, pinched nerve   • Clotting disorder (CMS/[facility]) 2013    Low platelets from chemo   • Colon polyp 2013   • Deep vein thrombosis (CMS/[facility]) 2013   • Diverticulosis 2013   • GERD (gastroesophageal reflux disease) 2016   • HL (hearing loss) 2016    I need hearing aids   • Hyperlipidemia 2013    Need my cholesterol rechecked   • Hypertension    • Joint pain 2013    Shoulder pain, torn rotator cuff   • Low back pain 2013   • Neuromuscular disorder (CMS/[facility]) 2015    Shingles, pinched nerves in my neck   • Osteopenia 2014   • Osteoporosis    • Ovarian cancer (CMS/[facility])    • Pneumonia 2010    Have had it several times   • Spinal stenosis 2013    Cervical   • Urinary tract infection 2013    Have had several utis       PAST SURGICAL  "HISTORY:  Past Surgical History:   Procedure Laterality Date   • COLON SURGERY     • COLONOSCOPY  05/26/2018   • EXPLORATORY LAPAROTOMY     • HERNIA REPAIR     • HYSTERECTOMY     • OOPHORECTOMY         CURRENT MEDICATIONS:    Current Outpatient Medications:   •  acetaminophen (TYLENOL) 500 MG tablet, Take 1,000 mg by mouth Every 6 (Six) Hours As Needed for Mild Pain ., Disp: , Rfl:   •  albuterol sulfate  (90 Base) MCG/ACT inhaler, Inhale 2 puffs Every 6 (Six) Hours As Needed for Wheezing., Disp: 6.7 g, Rfl: 0  •  atorvastatin (LIPITOR) 20 MG tablet, Take 20 mg by mouth every night at bedtime., Disp: , Rfl: 3  •  cyanocobalamin 1000 MCG/ML injection, Inject 1,000 mcg into the appropriate muscle as directed by prescriber Every 28 (Twenty-Eight) Days., Disp: , Rfl:   •  famotidine (PEPCID) 40 MG tablet, TAKE 1 TABLET BY MOUTH EVERY MORNING BEFORE BREAKFAST, Disp: 90 tablet, Rfl: 1  •  KLOR-CON 20 MEQ CR tablet, Take 3 tablets by mouth every morning and take 2 tablets by mouth at bedtime., Disp: 150 tablet, Rfl: 5  •  loperamide (Imodium A-D) 2 MG tablet, Take 1 tablet by mouth 3 (Three) Times a Day As Needed for Diarrhea., Disp: 60 tablet, Rfl: 2  •  magnesium oxide (MAG-OX) 400 MG tablet, TAKE 1 TABLET BY MOUTH TWICE A DAY, Disp: 180 tablet, Rfl: 1  •  methylPREDNISolone (MEDROL, JONES,) 4 MG tablet, Take as directed on package instructions., Disp: 1 each, Rfl: 0  •  ondansetron ODT (ZOFRAN-ODT) 8 MG disintegrating tablet, Take 8 mg by mouth Every 8 (Eight) Hours As Needed for Nausea or Vomiting., Disp: , Rfl:   •  oxyCODONE (ROXICODONE) 10 MG tablet, Take 1 tablet by mouth 3 (Three) Times a Day As Needed for Moderate Pain ., Disp: 90 tablet, Rfl: 0  •  pregabalin (LYRICA) 100 MG capsule, Take 1 capsule by mouth 3 (Three) Times a Day., Disp: 90 capsule, Rfl: 1  •  sertraline (ZOLOFT) 50 MG tablet, TAKE 1 TABLET BY MOUTH EVERY EVENING BEFORE BED, Disp: 90 tablet, Rfl: 1  •  Syringe 23G X 1\" 3 ML misc, INJECT 1 ML " B-12 ONCE MONTHLY, Disp: 1 each, Rfl: 3  •  temazepam (RESTORIL) 30 MG capsule, TAKE 1 CAPSULE BY MOUTH AT NIGHT AS NEEDED FOR SLEEP., Disp: 30 capsule, Rfl: 1  •  triamterene-hydrochlorothiazide (DYAZIDE) 37.5-25 MG per capsule, TAKE 1 CAPSULE BY MOUTH EVERY DAY, Disp: 90 capsule, Rfl: 1  •  warfarin (COUMADIN) 1 MG tablet, TAKE 1 TABLET BY MOUTH EVERY DAY AS DIRECTED, Disp: 45 tablet, Rfl: 2  •  warfarin (COUMADIN) 5 MG tablet, Active thrombosis  Indications: DVT/PE (active thrombosis) (Patient taking differently: Active thrombosis  Indications: DVT/PE (active thrombosis), On hold due to surgery), Disp: 30 tablet, Rfl: 0  No current facility-administered medications for this visit.     Facility-Administered Medications Ordered in Other Visits:   •  cyanocobalamin injection 1,000 mcg, 1,000 mcg, Intramuscular, Once, Valencia Cadena APRN  •  dexamethasone (DECADRON) IVPB 12 mg, 12 mg, Intravenous, Once, Cindy Ball MD  •  heparin injection 500 Units, 500 Units, Intravenous, PRN, Cindy Ball MD  •  sodium chloride 0.9 % flush 20 mL, 20 mL, Intravenous, PRN, Cindy Ball MD  •  topotecan (HYCAMTIN) 7.6 mg in sodium chloride 0.9 % 107.6 mL chemo IVPB, 4 mg/m2 (Treatment Plan Recorded), Intravenous, Once, Cindy Ball MD    ALLERGIES:  Allergies   Allergen Reactions   • Morphine Nausea Only and Hives   • Penicillin V Potassium Rash   • Sulfa Antibiotics Rash   • Levaquin [Levofloxacin] Nausea Only and Dizziness     When taken with Coumadin   • Amoxicillin Rash   • Norco [Hydrocodone-Acetaminophen] Rash       FAMILY HISTORY:  Family History   Problem Relation Age of Onset   • Hypertension Mother    • Cancer Father    • Hypertension Father    • Hypertension Sister    • Hypertension Brother    • Stroke Brother        ONCOLOGIC FAMILY HISTORY:  Cancer-related family history includes Cancer in her father.    SOCIAL HISTORY:  Social History     Tobacco Use   • Smoking status:  Never Smoker   • Smokeless tobacco: Never Used   Substance Use Topics   • Alcohol use: No     Frequency: Never     Comment: caffeine 32-64oz qd   • Drug use: No       I have reviewed the history of present illness, past medical history, family history, and social history, since the patient was last seen on 7/7/2020.    REVIEW OF SYSTEMS:  Review of Systems   Constitutional: Negative for fatigue and fever.   Respiratory: Negative for shortness of breath.    Cardiovascular: Negative for chest pain and leg swelling.   Gastrointestinal: Positive for diarrhea (Intermittent for the past few months). Negative for nausea and vomiting.   Neurological: Negative for dizziness and weakness.   Psychiatric/Behavioral: Negative for agitation and confusion.       PHYSICAL EXAMINATION:  Physical Exam   Constitutional: She is oriented to person, place, and time. She appears well-developed and well-nourished.   HENT:   Head: Normocephalic and atraumatic.   Eyes: Right eye exhibits no discharge. Left eye exhibits no discharge. No scleral icterus.   Neck: No thyromegaly present.   Pulmonary/Chest: Effort normal. No respiratory distress.   Left chest wall port   Musculoskeletal: She exhibits no edema.   Neurological: She is alert and oriented to person, place, and time.       LABS:  WBC   Date Value Ref Range Status   07/09/2020 4.14 3.40 - 10.80 10*3/mm3 Final   07/05/2020 4.04 (L) 4.5 - 11.0 10*3/uL Final     RBC   Date Value Ref Range Status   07/09/2020 3.48 (L) 3.77 - 5.28 10*6/mm3 Final   07/05/2020 3.68 (L) 4.0 - 5.2 10*6/uL Final     Hemoglobin   Date Value Ref Range Status   07/09/2020 11.0 (L) 12.0 - 15.9 g/dL Final   07/05/2020 11.5 (L) 12.0 - 16.0 g/dL Final     Hematocrit   Date Value Ref Range Status   07/09/2020 34.4 34.0 - 46.6 % Final   07/05/2020 35.3 (L) 36.0 - 46.0 % Final     MCV   Date Value Ref Range Status   07/09/2020 98.9 (H) 79.0 - 97.0 fL Final   07/05/2020 95.9 80.0 - 100.0 fL Final     MCH   Date Value Ref  Range Status   07/09/2020 31.6 26.6 - 33.0 pg Final   07/05/2020 31.3 26.0 - 34.0 pg Final     MCHC   Date Value Ref Range Status   07/09/2020 32.0 31.5 - 35.7 g/dL Final   07/05/2020 32.6 31.0 - 37.0 g/dL Final     RDW   Date Value Ref Range Status   07/09/2020 15.4 12.3 - 15.4 % Final   07/05/2020 15.9 12.0 - 16.8 % Final     RDW-SD   Date Value Ref Range Status   07/09/2020 54.3 (H) 37.0 - 54.0 fl Final     MPV   Date Value Ref Range Status   07/09/2020 11.1 6.0 - 12.0 fL Final   07/05/2020 10.2 6.7 - 10.8 fL Final     Platelets   Date Value Ref Range Status   07/09/2020 156 140 - 450 10*3/mm3 Final   07/05/2020 158 140 - 440 10*3/uL Final     Neutrophil Rel %   Date Value Ref Range Status   07/05/2020 54.0 45 - 80 % Final     Neutrophil %   Date Value Ref Range Status   07/09/2020 55.4 42.7 - 76.0 % Final     Lymphocyte Rel %   Date Value Ref Range Status   07/05/2020 30.4 15 - 50 % Final     Lymphocyte %   Date Value Ref Range Status   07/09/2020 26.3 19.6 - 45.3 % Final     Monocyte Rel %   Date Value Ref Range Status   07/05/2020 12.4 0 - 15 % Final     Monocyte %   Date Value Ref Range Status   07/09/2020 14.7 (H) 5.0 - 12.0 % Final     Eosinophil %   Date Value Ref Range Status   07/09/2020 3.4 0.3 - 6.2 % Final   07/05/2020 3.0 0 - 7 % Final     Basophil Rel %   Date Value Ref Range Status   07/05/2020 0.2 0 - 2 % Final     Basophil %   Date Value Ref Range Status   07/09/2020 0.2 0.0 - 1.5 % Final     Immature Grans %   Date Value Ref Range Status   07/05/2020 0.0 0 % Final     Neutrophils Absolute   Date Value Ref Range Status   07/05/2020 2.18 2.0 - 8.8 10*3/uL Final     Neutrophils, Absolute   Date Value Ref Range Status   07/09/2020 2.29 1.70 - 7.00 10*3/mm3 Final     Lymphocytes Absolute   Date Value Ref Range Status   07/05/2020 1.23 0.7 - 5.5 10*3/uL Final     Lymphocytes, Absolute   Date Value Ref Range Status   07/09/2020 1.09 0.70 - 3.10 10*3/mm3 Final     Monocytes Absolute   Date Value Ref  Range Status   07/05/2020 0.50 0.0 - 1.7 10*3/uL Final     Monocytes, Absolute   Date Value Ref Range Status   07/09/2020 0.61 0.10 - 0.90 10*3/mm3 Final     Eosinophils Absolute   Date Value Ref Range Status   07/05/2020 0.12 0.0 - 0.8 10*3/uL Final     Eosinophils, Absolute   Date Value Ref Range Status   07/09/2020 0.14 0.00 - 0.40 10*3/mm3 Final     Basophils Absolute   Date Value Ref Range Status   07/05/2020 0.01 0.0 - 0.2 10*3/uL Final     Basophils, Absolute   Date Value Ref Range Status   07/09/2020 0.01 0.00 - 0.20 10*3/mm3 Final     Immature Grans, Absolute   Date Value Ref Range Status   07/05/2020 0.00 <1 10*3/uL Final     nRBC   Date Value Ref Range Status   07/05/2020 0 0 /100(WBC) Final   12/11/2019 0.3 (H) 0.0 - 0.2 /100 WBC Final     Lab Results   Component Value Date    GLUCOSE 78 06/19/2020    BUN 15 07/05/2020    CREATININE 0.7 07/05/2020    EGFRIFNONA 78 06/19/2020    EGFRIFAFRI >60 06/07/2019    BCR 21.7 07/05/2020    K 3.9 07/05/2020    CO2 26 07/05/2020    CALCIUM 9.4 07/05/2020    ALBUMIN 3.7 07/03/2020    LABIL2 1.3 07/03/2020    AST 32 07/03/2020    ALT 16 07/03/2020       Assessment/Plan   Diarrhea, unspecified type  - loperamide (Imodium A-D) 2 MG tablet    Malignant neoplasm of right ovary (CMS/HCC)    ASSESSMENT   1. Encounter for medication management and education of chemotherapy/biotherapy    2. Recurrent ovarian cancer: Malignancy neoplasm of right ovary  3. Diarrhea- Intermittent  4. Anemia: Chronic.  Retacrit 40,000 units subcutaneous weekly for hemoglobin less than 10 ordered  5. B12 deficiency: On monthly B12 injections    PLAN  • Reviewed CBC   • Start Topotecan 4 mg/m2 weekly  • Ordered Imodium A-D 2 mg p.o. tablets to take 1 tablet by mouth 3 times a day as needed for diarrhea  • Continue monthly B12 injections  • Continue Retacrit 40,000 units subcutaneous weekly for hemoglobin less than 10  • Notified , financial counselor, and dietician patient starting new  treatment   • RTC 7/16/20 for INR and next topotecan infusion   • RTC 7/23/20 for follow-up appointment with Dr. Ball    I have reviewed labs results, imaging, vitals, and medications with the patient today.    A total of 30 minutes were spent with the patient, with greater then 50% of time spent in education and counseling.    Electronically signed by IRENA Fuchs, 07/09/20, 9:36 AM.

## 2020-07-09 NOTE — PROGRESS NOTES
"Subjective   Tahira Zimmerman is a 64 y.o. female.     neck and shoulder pain, all over aching \"bone\" due to chemo--also left rotator cuff tear also broke out with shingles-under breast on back and on head. 9/10 at worst, 2/10 at best. Nonradiating. Always present, varies, interferes with daily activities. Imaging shows metastatic disease. Lengthy prior notes reviwed, treated for metastatic disease, failed Tramadol, cannot tolerate Hydrocodone, could tolerate Oxycodone 5mg, taking BID, also Lyrica 75mg BID. Started new chemo, worsening pain in b/l hips and legs, especially at night. Now taking Oxycodone 10mg TID prn and Lyrica 100mg BID with good relief. Started another new chemo with worsening pain, on a break while insurance approves pill form with improved pain. New chemo pill lowered Mg, holding it while getting Mg infusions. Shingles outbreak. Started another new chemo, has aches controlled by current meds. Had fall, 2 \"somethings\" found on her brain on imaging, will meet with neurosurgeon to discuss options.       The following portions of the patient's history were reviewed and updated as appropriate: allergies, current medications, past family history, past medical history, past social history, past surgical history and problem list.    Review of Systems   Constitutional: Negative for chills, fatigue and fever.   HENT: Positive for hearing loss. Negative for trouble swallowing.    Eyes: Negative for visual disturbance.   Respiratory: Negative for shortness of breath.    Cardiovascular: Negative for chest pain.   Gastrointestinal: Positive for abdominal pain and nausea. Negative for constipation, diarrhea and vomiting.   Genitourinary: Negative for urinary incontinence.   Musculoskeletal: Positive for arthralgias, back pain and neck pain. Negative for joint swelling and myalgias.   Neurological: Positive for headache. Negative for dizziness, weakness and numbness.       Objective   Physical Exam "   Constitutional: She is oriented to person, place, and time. She appears well-developed and well-nourished.   HENT:   Head: Normocephalic and atraumatic.   Eyes: Pupils are equal, round, and reactive to light. EOM are normal.   Neck: Normal range of motion.   Cardiovascular: Normal rate, regular rhythm, normal heart sounds and intact distal pulses.   Pulmonary/Chest: Breath sounds normal.   Abdominal: Soft. Bowel sounds are normal. She exhibits no distension. There is no tenderness.   Neurological: She is alert and oriented to person, place, and time. She has normal strength and normal reflexes. She displays normal reflexes. A sensory deficit is present.   Decreased in b/l stocking distribution   Psychiatric: She has a normal mood and affect. Her behavior is normal. Thought content normal.         Assessment/Plan   Tahira was seen today for neck pain.    Diagnoses and all orders for this visit:    Leg pain, bilateral    Pain    Cervical disc disorder with radiculopathy    Fibromyositis    Osteoarthritis of cervical spine without myelopathy    Other long term (current) drug therapy    Restless leg syndrome        [facility] reviewed, in order. Low risk. Repeat drug screen 1/6/20 in order.  Cont Oxycodone 10mg 1/2 - 1 tab TID prn, cannot tolerate Hydrocodone, failed Tramadol. May need to increase in future, possibly before next visit with starting IV chemo.  Increased to Lyrica 100mg TID for worsening pain. More effective than Gabapentin, already taking antidepressants so no plans for beginning Cymbalta.  Cont RxAlt shingles cream prn.  Cont other meds as prescribed.  No plans for procedures or TENS with metastatic disease.  RTC 3 months for f/u.

## 2020-07-09 NOTE — PROGRESS NOTES
Case Management/ Note    Patient Name: Tahira Zimmerman  YOB: 1955  MRN #: [id number]    OSW met with patient for OSW consult in conjunction with chemotherapy teaching. Patient is alert and oriented to person, place and time. BIMS score 15/15. Patient is concerned about her memory but cannot give any specific examples of memory loss. We discussed this as it relates to stress, mood, and lack of concentration. She spoke about being under stress due to her recurrence and limited options for chemotherapy as she has been in treatment for 25 years. Supportive care provided. She denies any problem with her mood over the last two weeks. She takes zoloft 50 mg daily. She spoke about her family and the support they give to her. She continues to live with her daughter. Basic needs are met. She speaks with her son weekly. She has seven grandchildren and one great-grandchild. She says she enjoys reading and doing word puzzles. She was told of the livestrong program at the [facility] and massage therapy and knows these are not yet open due to covid-19. OSW will remain available.     Electronically signed by:   Laura Corley LCSW, [email or web address]  07/09/20, 10:01 AM

## 2020-07-09 NOTE — TELEPHONE ENCOUNTER
I received a message from Keri Hollis on 7/8/2020.  The patient stating that she was needing clearance for her eye surgery.  I spoke with the patient at her appointment and she stated that she is no longer going to have the surgery.

## 2020-07-10 RX ORDER — WARFARIN SODIUM 1 MG/1
1 TABLET ORAL DAILY
Qty: 45 TABLET | Refills: 2 | Status: SHIPPED | OUTPATIENT
Start: 2020-07-10 | End: 2020-09-30

## 2020-07-16 ENCOUNTER — LAB (OUTPATIENT)
Dept: LAB | Facility: HOSPITAL | Age: 65
End: 2020-07-16

## 2020-07-16 ENCOUNTER — HOSPITAL ENCOUNTER (OUTPATIENT)
Dept: ONCOLOGY | Facility: HOSPITAL | Age: 65
Setting detail: INFUSION SERIES
Discharge: HOME | End: 2020-07-16

## 2020-07-16 VITALS
WEIGHT: 178 LBS | TEMPERATURE: 96.6 F | HEIGHT: 65 IN | SYSTOLIC BLOOD PRESSURE: 110 MMHG | BODY MASS INDEX: 29.66 KG/M2 | HEART RATE: 58 BPM | DIASTOLIC BLOOD PRESSURE: 71 MMHG | RESPIRATION RATE: 18 BRPM

## 2020-07-16 DIAGNOSIS — Z79.01 LONG TERM (CURRENT) USE OF ANTICOAGULANTS: Primary | ICD-10-CM

## 2020-07-16 DIAGNOSIS — C56.1 MALIGNANT NEOPLASM OF RIGHT OVARY (HCC): Primary | ICD-10-CM

## 2020-07-16 DIAGNOSIS — I82.722 CHRONIC DEEP VEIN THROMBOSIS (DVT) OF LEFT UPPER EXTREMITY, UNSPECIFIED VEIN (HCC): ICD-10-CM

## 2020-07-16 DIAGNOSIS — Z95.828 PORT-A-CATH IN PLACE: ICD-10-CM

## 2020-07-16 LAB
BASOPHILS # BLD AUTO: 0.01 10*3/MM3 (ref 0–0.2)
BASOPHILS NFR BLD AUTO: 0.3 % (ref 0–1.5)
DEPRECATED RDW RBC AUTO: 52.4 FL (ref 37–54)
EOSINOPHIL # BLD AUTO: 0.1 10*3/MM3 (ref 0–0.4)
EOSINOPHIL NFR BLD AUTO: 3.1 % (ref 0.3–6.2)
ERYTHROCYTE [DISTWIDTH] IN BLOOD BY AUTOMATED COUNT: 15.1 % (ref 12.3–15.4)
HCT VFR BLD AUTO: 31.7 % (ref 34–46.6)
HGB BLD-MCNC: 10.4 G/DL (ref 12–15.9)
INR PPP: 1.1
LYMPHOCYTES # BLD AUTO: 1.25 10*3/MM3 (ref 0.7–3.1)
LYMPHOCYTES NFR BLD AUTO: 39.2 % (ref 19.6–45.3)
MAGNESIUM SERPL-MCNC: 1.3 MG/DL (ref 1.6–2.4)
MCH RBC QN AUTO: 32.1 PG (ref 26.6–33)
MCHC RBC AUTO-ENTMCNC: 32.8 G/DL (ref 31.5–35.7)
MCV RBC AUTO: 97.8 FL (ref 79–97)
MONOCYTES # BLD AUTO: 0.26 10*3/MM3 (ref 0.1–0.9)
MONOCYTES NFR BLD AUTO: 8.2 % (ref 5–12)
NEUTROPHILS NFR BLD AUTO: 1.57 10*3/MM3 (ref 1.7–7)
NEUTROPHILS NFR BLD AUTO: 49.2 % (ref 42.7–76)
PLATELET # BLD AUTO: 94 10*3/MM3 (ref 140–450)
PMV BLD AUTO: 11.4 FL (ref 6–12)
RBC # BLD AUTO: 3.24 10*6/MM3 (ref 3.77–5.28)
WBC # BLD AUTO: 3.19 10*3/MM3 (ref 3.4–10.8)

## 2020-07-16 PROCEDURE — 36591 DRAW BLOOD OFF VENOUS DEVICE: CPT

## 2020-07-16 PROCEDURE — 85610 PROTHROMBIN TIME: CPT

## 2020-07-16 PROCEDURE — 96360 HYDRATION IV INFUSION INIT: CPT

## 2020-07-16 PROCEDURE — 83735 ASSAY OF MAGNESIUM: CPT | Performed by: INTERNAL MEDICINE

## 2020-07-16 PROCEDURE — 25010000003 HEPARIN LOCK FLUSH PER 10 UNITS: Performed by: INTERNAL MEDICINE

## 2020-07-16 PROCEDURE — 96367 TX/PROPH/DG ADDL SEQ IV INF: CPT

## 2020-07-16 PROCEDURE — 96413 CHEMO IV INFUSION 1 HR: CPT

## 2020-07-16 PROCEDURE — 85025 COMPLETE CBC W/AUTO DIFF WBC: CPT | Performed by: INTERNAL MEDICINE

## 2020-07-16 PROCEDURE — 25010000002 TOPOTECAN 4 MG/4ML SOLUTION 4 ML VIAL: Performed by: INTERNAL MEDICINE

## 2020-07-16 PROCEDURE — 25010000002 DEXAMETHASONE SODIUM PHOSPHATE 120 MG/30ML SOLUTION: Performed by: INTERNAL MEDICINE

## 2020-07-16 RX ORDER — SODIUM CHLORIDE 0.9 % (FLUSH) 0.9 %
20 SYRINGE (ML) INJECTION AS NEEDED
Status: CANCELLED | OUTPATIENT
Start: 2020-07-16

## 2020-07-16 RX ORDER — SODIUM CHLORIDE 9 MG/ML
250 INJECTION, SOLUTION INTRAVENOUS ONCE
Status: COMPLETED | OUTPATIENT
Start: 2020-07-16 | End: 2020-07-16

## 2020-07-16 RX ORDER — HEPARIN SODIUM (PORCINE) LOCK FLUSH IV SOLN 100 UNIT/ML 100 UNIT/ML
500 SOLUTION INTRAVENOUS AS NEEDED
Status: DISCONTINUED | OUTPATIENT
Start: 2020-07-16 | End: 2020-07-17 | Stop reason: HOSPADM

## 2020-07-16 RX ORDER — SODIUM CHLORIDE 0.9 % (FLUSH) 0.9 %
20 SYRINGE (ML) INJECTION AS NEEDED
Status: DISCONTINUED | OUTPATIENT
Start: 2020-07-16 | End: 2020-07-17 | Stop reason: HOSPADM

## 2020-07-16 RX ORDER — HEPARIN SODIUM (PORCINE) LOCK FLUSH IV SOLN 100 UNIT/ML 100 UNIT/ML
500 SOLUTION INTRAVENOUS AS NEEDED
Status: CANCELLED | OUTPATIENT
Start: 2020-07-16

## 2020-07-16 RX ADMIN — SODIUM CHLORIDE 250 ML: 900 INJECTION, SOLUTION INTRAVENOUS at 09:54

## 2020-07-16 RX ADMIN — DEXAMETHASONE SODIUM PHOSPHATE 12 MG: 4 INJECTION, SOLUTION INTRA-ARTICULAR; INTRALESIONAL; INTRAMUSCULAR; INTRAVENOUS; SOFT TISSUE at 09:54

## 2020-07-16 RX ADMIN — SODIUM CHLORIDE 7.6 MG: 900 INJECTION, SOLUTION INTRAVENOUS at 10:20

## 2020-07-16 RX ADMIN — Medication 20 ML: at 10:54

## 2020-07-16 RX ADMIN — HEPARIN 500 UNITS: 100 SYRINGE at 10:55

## 2020-07-17 NOTE — PROGRESS NOTES
Hematology/Oncology Outpatient Follow Up    PATIENT NAME:Tahira Zimmerman  :1955  MRN: [id number]  PRIMARY CARE PHYSICIAN: Noemy Queen MD  REFERRING PHYSICIAN: Noemy Queen MD    Chief Complaint   Patient presents with   • Follow-up     Malignant neoplasm of right ovary          HISTORY OF PRESENT ILLNESS:   1. Recurrent ovarian cancer diagnosis established in 2013.  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  The patient was treated with surgery and then postoperatively with adjuvant chemotherapy consisting of Carboplatin and Taxol.  Six months later, she had recurrence of disease intra-abdominally between the rectum and vagina, which was treated with intraabdominal peritoneal chemotherapy.  She had been free of disease since that time.  She reported feeling a mass in the left side of her abdomen approximately six months ago.  This gradually grew and in early 2013 she had her daughter feel the mass.  The daughter works for Dr. David Rogers and the patient at that time also complained of intermittent rectal bleeding.  Consultation with Dr. David Rogers was obtained.  The patient had a CT scan of the abdomen and pelvis performed on 13 revealing left lower quadrant mass measuring 9.7 x 9.3 x 6 cm demonstrating areas of dense calcification, soft tissue density and cystic density within it.  Stromal tumor is felt to be most likely a possibly fibroma.  It is generated with necrosis and calcification.  Possible palpable mass in the rectus muscle is seen with calcification and deformity of the rectus muscle and just below this a second mass intra-abdominally was seen measuring 2 cm in the greatest diameter suspicious for second intraabdominal tumor.  The mass separate from the largest mass measuring 2.6 cm in the dependent portion of pelvis is seen on the right of the midline.  No  retroperitoneal lymphadenopathy was seen.  No free air, free fluid or other abnormality was present.  The patient was scheduled for an outpatient colonoscopy, but had syncopal episode while sitting in the toilet the night before and was brought to the emergency room early in the morning by her daughter and son-in-law.  The patient had apparently stopped breathing for a few seconds and did not have a pulse, but started breathing before CPR could be initiated.  In the emergency room, the patient had an EKG revealing sinus rhythm nonspecific T-wave flattening; metabolic panel revealed a glucose of 148, creatinine of 1.3, CBC was normal.  She was treated with intravenous fluid.  The patient’s case was then discussed with Dr. Anabell Monique and patient was subsequently admitted to [facility].  The patient underwent a colonoscopy by Dr. David Rogers on 06/27/13 revealing sigmoid diverticulosis and grade II internal and external hemorrhoids, but no mass or colitis was seen.  CT-guided biopsy of the mass was performed on 06/27/13 as well revealing metastatic papillary adenocarcinoma with numerous psammoma bodies.  Pathology comment stated prior records were not available; however, with history of ovarian cancer the current biopsy would be consistent with metastases from that malignancy.  Oncology consult was obtained and I initially saw the patient on 06/27/2013 where the patient had claimed to have little bit of pain in the area of disease.  She reported being frequently constipated, denies any weight loss or fevers.  She did report having some night sweats, but thought that was because of her menopause.  On 06/27/13 metastatic workup including labs and CT scans were initiated.  CT scan of the chest on 06/27/13 revealed no acute disease in the chest.  A 1.4 cm size focal area of decreased density was noted in the lateral aspect of the right lobe of the liver consistent with a benign cyst or hemangioma.   There was a very small hiatal hernia measuring 2.2 cm in diameter.  A CT scan of the head performed 06/27/13 revealed no acute intracranial abnormality noted.  CA-125 was 40 units/ml (0-35), CA 19-9 was 11.8 units/ml (0-35), CEA level was 0.8 ng/ml (0-3), TSH level was 1.09 IU/ml (0.34-5.6).  The patient was in workup for the syncopal episode, a carotid Doppler was performed on June 28 revealing an essentially normal study showing a reduction in diameter from 0-15% bilaterally.  A Holter monitor was completed on 06/27/13, which was unremarkable except for a few premature atrial contractions.  The patient was felt stable and subsequently was discharged home on 06/28/13.  Post discharge, the patient was seen at [facility] on 07/05/13 by Dr. Kathryn Thrasher who discussed treatment options with the patient including surgery.  The patient is in the office today 07/16/13 in follow up post hospitalization.  She is reporting that surgery is planned for July 30th of this month at .  · 7/30/13 to 8/3/13 - The patient was in [facility].  The patient was admitted for surgery for her recurrent ovarian cancer.  The patient had exploratory laparotomy, omentectomy, bowel resection, liver biopsy and appendectomy.  Pathology revealed of the omentum metastatic serous carcinoma of the transverse colon, segmental resection showed metastatic serous carcinoma involving mesenteric fat.  The liver wedge resection showed fibrosclerotic thickening of the hepatic capsule.  Benign liver parenchyma.  No evidence of metastatic tumor.  Appendix showed fibrous obliteration of the lumen.  Negative for metastatic carcinoma.  Rectum and sigmoid colon segmental resection showed metastatic serous carcinoma invading the colorectal mucosa uninvolved by tumor.  Vaginal mass showed metastatic serous carcinoma.  Margins are positive for tumor.  · 9/24/13 - Chemotherapy orders were written for patient to start  carboplatin 550 mg IV day one and Taxol 330 mg IV day one to be cycled every three weeks.  · 10/10/13 - The patient started cycle #1 of chemotherapy consisting of Carboplatin and Taxol.  · 09/25/13 -  is 10.6 normal.  · 10/01/13 - CT of the chest, abdomen and pelvis revealed new reticular nodular occupancy in the anterior segment of the right upper lobe, could reflect an inflammation or infectious etiology or could reflect unusual persistence of metastatic tumor.  New liver lesions, the smaller one appears to be implant on the surface of the liver, the larger may also represent an implant including the intrahepatic.  Several small mesenteric nodes seen throughout the abdomen and pelvis.  · 10/02/13 - Port placed by Dr. Sen.  · 10/24/13 - Orders written to start Neupogen daily and if more than three Neupogen are needed to give Neulasta after next chemo.  ANC is 0.15.  · 10/31/14 - Patient given cycle 2 of chemotherapy with Taxol and Carboplatin.  · 11/21/13 - The patient started cycle 3 of chemotherapy consisting of Carboplatin and Taxol.  · 11/26/13 - CT scan of chest, abdomen and pelvis revealed no evidence of active disease in the chest.  Reticulonodular opacity has resolved.  Metastatic lesion impressing upon the hepatic dome similar to prior exam.  No evidence of a change.  No evidence of new disease.  Postsurgical changes in the pelvis and throughout the retroperitoneum in the large bowel.  Finding containing anterior abdominal wall hernia containing fat tissue and enhancing vessels, 3 cm in diameter.  · 12/2/13 - Orders written to start Neupogen per protocol as well as Aranesp due to low hemoglobin and ANC.  ANC is 0.14.  Hemoglobin is 9.7.  · 12/11/13 - Orders written to hold chemotherapy until next week and decrease dose by 20% due to low platelet count.  Platelet count is 85,000.  · 12/16/13 - The patient received cycle 4 of Carboplatin and Taxol at a 20% dose reduction so Taxol dose is 260 mg and  Carboplatin is 440 mg.  · 12/16/13 - CA-125 12.6 (N).  · 12/19/13 - PET/CT scan.  Impression:  1. There is no evidence of hypermetabolism in the liver.  Specifically of the dominant lesion in or adjacent to the right hepatic dome that was seen and described on the recent CT study of 11/26/2013.  It is photopenic relative to the surrounding liver.  2.  There is no evidence of hypermetabolic soft issue mass lesion or free fluid in the abdomen or pelvis.  3.  The tonsillar tissues are slightly enlarged and have moderate activity level.  This maybe physiologic.  Correlation with oral cavity, examination is recommended.  4.  Mild amount of activity in the right level II jugular lymph chain without focally enlarged lymph nodes is of questionable significance.  · 1/6/13 - The patient received cycle 5 of chemotherapy with Taxol and Carboplatin.  · 1/27/14 - The patient started on cycle 6 of Taxol and Carboplatin.  · 2/18/14 - CT of the abdomen and pelvis with contrast; stable lesions impressing upon the hepatic dome, unchanged compared to the prior exam.  Multiple anterior abdominal wall hernias re-demonstrated.  Those above the umbilicus contain omental fat.  Below and to the left of the umbilicus as anterior abdominal hernia containing omental fat in nondilated small bowel which is larger than seen previously.  · 2/20/14 - The patient received cycle 7 of chemotherapy with Taxol and Carboplatin.   · 3/11/14 - Order written to discontinue chemotherapy due to patient has completed 7 cycles of chemotherapy and CT scans suggest possible remission.  · 3/11/14 -  11.0 (N).  · 06/05/14 - CT scan of the chest abdomen and pelvis with contrast: There are multiple anterior abdominal wall hernias.  There are 2 larger anterior abdominal wall hernias at the level of the transverse colon, which contain omental fat.  There are at least 2 small anterior abdominal wall hernias that contain omental fat.  There is a moderate sized  anterior abdominal wall hernia at the level of the umbilicus, eccentric to the left, which contain non-dilated bowel.  Interval decrease in the size of two deposit impressing on the liver.  The third tiny deposit has been stable.  · 8/19/14 -  10.4 (N).  · 12/19/14 -   10.0 (N)  · 1/7/15 - CT chest, abdomen and pelvis with stable appearance of the chest with several micronodules. Small hiatal hernia, slightly larger than previous exam, increased from 1.5 to 2.5 cm in diameter. Bochdalek hernia unchanged. Multiple hepatic lesions. The two dominant lesions at the right hepatic dome had decreased in size from previous. The remaining tiny nodules measured 3-5 mm in diameter and were unchanged. There was no evidence of new intra-abdominal or pelvic mass or free fluid. There were multiple anterior abdominal wall hernias which had increased in size.   · 7/27/15 - Comprehensive metabolic panel with no significant abnormalities. CA-125 of 21 (0-35).   · 10/26/15 - WBC 6, hemoglobin 13, platelet count 204,000. Heart rate 47. CA-125 of 20 (0-35).    · 2/18/16 - CT chest with contrast with stable micronodular density seen in the lungs without significant change from prior exam. CT abdomen and pelvis with regression of liver lesions with no new evidence of metastasis. Multiple ventral hernias again noted without significant change from prior exam. Creatinine 0.9 (0.6-1.3).    · 2/25/16 - [place] [facility] and 2/27/16 with pyelonephritis secondary to E-coli. She was given two days of IV Rocephin and then placed on oral Levaquin. She was asked to hold her Coumadin, but then had nausea and vomiting because of Levaquin and stopped the Levaquin also. Her CA-125 was 32 (0-35) and CEA 1.3 (0-5). Her urine grew >100,000 E-coli. CT of the abdomen and pelvis was done which revealed 4.1 x 1.8 cm sized mild ovoid area of decreased density in the liver parenchyma along the anteromedial  aspect of the dome of the right lobe of the liver, unchanged significantly since the previous study of 2/18/16. There was suspicion of a very small pericardial effusion. There was suspicion of a small hiatal hernia. There were several hernias in the anterior abdominal wall. A 3.5 x 3.1 cm incised well-circumscribed abnormal density in the left retroperitoneal fatty soft tissue at the level of the left iliac crest was suggestive of tumor recurrence. There was an abnormal density in the left retroperitoneal soft tissues in the left flank that partially surrounded the left kidney and extended downward to the left pelvic area. Diverticulosis of the distal descending colon and sigmoid colon were seen.     · 3/8/16 - Patient prescribed Bactrim DS 1 p.o. b.i.d. for 10 days for pyelonephritis, which was partially treated with Rocephin and Levaquin. Urine with 40,000 E-coli treated with Macrobid for 7 days.  of 20 (0-35).    · 3/31/16 - PET scan with area of low density anteriorly in the right lobe of the liver with no significant radiopharmaceutical uptake to suggest malignancy. Multiple round soft tissue density masses showing increased radiopharmaceutical activity and multiple anterior abdominal wall hernias.   · 5/10/16 - Patient complains of venous enlargement of the left chest wall around the port. Has not been seen by  yet, but has an appointment on 5/23/16. Complained of a cough.   · 5/12/16 - Infusaport contrast study with no evidence of fibrin sheath. Chest x-ray with mild cardiac prominence.   · 5/23/16 - Patient seen in consultation by Sheng Bowman M.D. at [facility] and a chemotherapy trial recommended.   · 6/1/16 -   of 27.1 (0-35).    · 6/14/16 - WBC 5.71, hemoglobin 12.4, platelet count 188,000. Patient is scheduled for surgery at  on 6/16/16 after further discussion with  physicians. Discussed adjuvant chemotherapy.   · 6/16/16 - Excisional biopsy of abdominal wall mass  performed by Sheng Bowman M.D. at [facility] with pathology revealing metastatic high-grade papillary serous carcinoma.   · 7/7/16 - Received a call from the patient that Tramadol was not working and that she was given Norco #24 after her biopsy at  which did help her pain. Patient prescribed Norco 5/325 one p.o. q. 4-6 hours p.r.n. #60 with no refills. Echocardiogram with left ventricular inferior wall hypokinesis with ejection fraction of 50-55%.   · 7/8/16 - Patient seen in consultation by Sheng Bowman M.D. in consultation at  for metastatic high-grade papillary serous carcinoma diagnosed by excisional biopsy on 6/16/16 with carcinomatosis and patient not a surgical resection candidate. Genetic sequencing and susceptibility testing of tumor was ordered. Biopsy was done of anterior abdominal wall.   · 7/7/16 - Echocardiogram with left atrial enlargement. Mild mitral regurgitation. Left ventricular proximal inferior wall hypokinesis with ejection fraction of 50-55%.   · 7/11/16 - While waiting for genomics results, in order not to delay treatment further, order written for Taxotere 60 mg/M2 IV over one hour followed by Carboplatin AUC 5 over one hour, cycles to be repeated every three weeks.  Comprehensive metabolic panel normal.  26 (0-35).    · 725/16 - Pain contract signed for use of Norco.   · 8/5/16 - Patient stated that she experienced a red rash and was itchy post taking Norco. Norco discontinued and Tramadol refilled.   · 8/16/16 - Patient started cycle 1 chemotherapy. WBC 7.1, hemoglobin 11.2, MCV 88.7, platelet count 94,000. Patient complained of nausea for a week post chemo. Already getting Emend, Aloxi and Decadron. Added Omeprazole 40 mg daily orally.   · 8/17/16 - Urine culture with >100,000 E-coli.   · 8/25/16 - Cycle 2 chemotherapy given.   · 9/9/16 - Magnesium 1.3, replaced intravenously. Potassium 3.4 (3.6-5.1), increased oral potassium supplementation.    · 9/16/16 -  Cycle 3 chemotherapy given.   · 9/19/16 - Comprehensive metabolic panel with no significant abnormality.   · 9/20/16 - CT abdomen and pelvis with evidence of progressive metastatic disease in the abdomen and pelvis with interval enlargement of several soft tissue attenuations and subcutaneous nodules in the anterior abdominal wall. Interval enlargement of a left pelvic soft tissue attenuation mass. A lentiform area of low attenuation in the dome of the liver stable in size. Multiple anterior abdominal wall hernias containing fat and/or bowel. Marked pelvic floor laxity. CT chest negative for evidence of metastatic disease. Tiny pulmonary nodules in the right lung stable since 2013 consistent with a benign etiology.   · 9/23/16 - Macrobid 100 mg p.o. b.i.d. x7 days prescribed for UTI. Current chemotherapy discontinued after discussion and new chemotherapy orders written. Carboplatin AUC-5 IV day 1 and Doxil (Liposomal Doxorubicin) 30 mg/M2 IV day 1 to cycle q. 21 days.  of 18 (0-35). Magnesium 1.6, replaced intravenously. Comprehensive metabolic panel with potassium 3.4 (3.6-5.1).     · 10/13/16 - Patient started on cycle 1 of Carboplatin and Liposomal Doxorubicin followed by Neulasta.   · 11/3/16 - Cycle 2 Carbo and Doxil given.   · 11/17/16 - Magnesium 1.0, replaced intravenously. Oral potassium supplement increased.   · 11/29/16 - CT chest with small non-calcified right pulmonary nodules unchanged. Benign bone island in the midthoracic vertebral body along with benign bony hemangiomas in the middle thoracic vertebral bodies. CT abdomen and pelvis with mild progressive metastatic disease from CT of September 2016. Anterior abdominal wall mass superiorly mildly increased in size with new area noted as described measuring 3 x 1.7 cm. 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 Stable area of decreased uptake in the dome of the liver not hypermetabolic on recent PET  scan, likely representing cyst or focal fatty infiltration. Stable small hiatal hernia, fat-containing Bochdalek’s hernia and anterior abdominal wall hernias with stable pelvic floor relaxation.    · 12/1/16 - Cycle 3 chemotherapy given.   · 12/8/16 - Current chemotherapy discontinued and patient started on Gemcitabine 1000 mg/M2 IV days 1, 8, 15 q. 28 days.   · 12/22/16 - Patient seen in followup by Juliana Roy M.D. at the pain clinic, treated with Oxycodone and Lyrica. Transaminases normal. Patient started cycle 1 chemotherapy.   · 12/29/16 - Magnesium 1.1 (1.8-2.5), replaced intravenously.   · 1/5/17 - Cycle 1 day 15 chemotherapy held as patient leaving for vacation to [place]. Chemotherapy dose decreased by 20%. Patient complains of diarrhea for which Imodium prescribed.   · 1/11/17 - BRCA-1 and BRCA-2 negative.   · 1/12/17 - Echocardiogram with ejection fraction 60% or greater.   · 1/19/17 - Comprehensive metabolic panel with no significant abnormality. Patient started cycle 2 chemotherapy.   · 2/2/17 - Urine with >100,000 E-coli treated with Cipro 500 mg p.o. b.i.d. x1 week.   · 2/6/17 - Magnesium 1.1 (1.8-2.5), replaced intravenously.    · 2/23/17 - Patient started cycle 3 chemotherapy.   · 2/27/17 - CT chest with interval development of too numerous to count very small pulmonary nodules, ill-defined ground glass opacities concerning for development of pulmonary metastatic disease. Stable left-sided chest port. CT abdomen and pelvis with stable appearance of multiple small soft tissue densities within the abdomen near midline subcutaneous fat of the abdominal wall. Interval decrease in size of largely calcified left pelvic mass and multiple fat in bowel containing small ventral hernias.   · 3/6/17 - Pulmonary consultation obtained for lung nodules. Discussed CT results with patient. Decision made to continue present chemotherapy until further lung evaluation as abdominal disease better.  of 18  (0-35).    · 3/16/17 - Patient started cycle 4 chemotherapy, but treatment held from day 8 onwards because of hospitalization.   · 3/19/17 - Patient was hospitalized at [facility] between 3/19/17 and 3/25/17 with chest pain. Chest x-ray had revealed increased mild bibasilar airspace disease. Troponin I and EKG’s were negative. INR was elevated. Patient was treated with antibiotics. EGD was performed revealing small hiatal hernia and esophageal dilatation was performed. Bronchoscopy was performed also with no intrabronchial lesions identified. IgA was 51 (), IgG 320 (600-1500) and IgM 19 (). Lexiscan nuclear stress test had no evidence of reversible myocardial ischemia with normal left ventricular ejection fraction of 58%. CT chest had development of patchy bilateral ground glass opacities most pronounced involving the left upper lobe and bilateral lower lobes. Multiple tiny bilateral pulmonary nodules were less conspicuous. The patient underwent a bronchoscopy by Jerica Esparza M.D. with right upper lobe bronchoalveolar lavage revealing benign squamous cells and bronchial cells with pulmonary macrophages present. There was mild acute inflammation. Negative for malignant cells. Prilosec was changed to Famotidine for hypomagnesemia.    · 4/6/17 - Magnesium 1.2 (1.8-2.5). Comprehensive metabolic panel with no significant abnormality. Patient seen in follow-up by Fito Ram M.D. at [facility]. Treated with Lyrica and Oxycodone.    · 5/4/17 - Patient complains of a rash itching on her right upper arm. Eczematous rash noted and patient prescribed Cyclocort 0.1% b.i.d. Magnesium infusions continued with each chemo. Order written to resume chemotherapy.   · 5/16/17 - Cycle 5 chemotherapy started.   · 5/26/17 - Magnesium 1.4 (1.8-2.5), replaced intravenously. Potassium 2.9 (3.6-5.1), KCL increased to 20 mEq three in the morning and three in the evenings.   · 5/30/17 - PET scan with remaining metabolic  activity within the nodular areas. The suggested mass which is partially calcified and necrotic within the left aspect of the pelvis seems slightly larger with increased metabolic activity. There was more calcification in these areas compared to prior study, suggesting inflammation.      · 6/8/17 - Patient complaining of shortness of breath. Does take HCTZ at home. Chest x-ray ordered and chemotherapy continued along with weekly magnesium replacement. Chest x-ray with no acute cardiopulmonary abnormalities.   · 6/13/17 - Patient received cycle 6 of chemotherapy.   · 7/31/17 - WBC 5.0, hemoglobin 11.2, MCV 89.7, platelet count 217,000. Patient is to resume chemotherapy starting with cycle 7 on Wednesday of this week.  of 19 (<35). Potassium 3.3 (3.5-5.3).     · 8/2/17 - Patient began cycle 7 of chemotherapy.   · 8/30/17 - Patient started cycle 8 chemotherapy.   · 9/5/17 - Patient seen in followup at Pain Management by Fito Ram M.D. and continued on treatment with Oxycodone and Lyrica.   · 9/13/17 - Patient was hospitalized at [facility] for a day with dizziness secondary to dehydration, hypokalemia and anemia. She was given 1 unit of packed red blood cells and electrolytes were replaced. Chest x-ray revealed a subtle opacity in the left lateral lung base favored to represent atelectasis. Magnesium 1.3 (1.5-2.5), patient continued on replacement.    · 9/22/17 - Chemotherapy and magnesium replacement continued with decrease in chemotherapy dose by 10% secondary to cytopenias.   · 9/25/17 - Cycle 9 chemotherapy started.   · 10/12/17 - CT of the chest with contrast showed several tiny pulmonary nodules. One within the right lower lobe appears new from prior exams. Two adjacent foci of nodularity within the medial right lower lobe suggestive of minor infectious or inflammatory process. CT of the abdomen and pelvis with contrast which showed soft tissue nodules along the anterior abdominal and pelvic walls  unchanged from previous scan. Also a partially calcified mass within the left pelvis unchanged. No acute process identified within the abdomen or pelvis. Several left paracentral ventral hernias unchanged. No evidence of acute bowel obstruction.   · 10/16/17 - Order written for Levaquin 500 mg p.o. daily as the patient states that she has had a productive cough, fever and chills and with the results of the CT scan showing a possible infectious versus inflammatory process. Order also written to continue chemotherapy and magnesium replacement as previously prescribed.   · 10/23/17 - Cycle 10 chemotherapy started.   · 11/19/17 - Patient went to the Emergency Room at [facility] complaining of right lower extremity redness and fever for a day. Venous Doppler had no evidence of a DVT and patient was discharged home on Clindamycin.   · 11/21/17 -  of 17 (0-35).   · 12/4/17 - Cycle 11 chemotherapy started.   · 12/20/17 - PET scan with multiple hypermetabolic soft tissue nodules abutting the anterior abdominal wall, stable from previous examination. Peripherally calcified left lower quadrant mass near the ascending colon stable in size demonstrating no hypermetabolic uptake. Previously had maximum SUV of 7. Right medial basilar pulmonary nodules resolved.   · 12/22/17 - CT left lower extremity with soft tissue swelling with skin thickening present along the anterior and medial aspect of the leg, slightly more pronounced around the palpable marker seen within the upper medial aspect of the lower extremity.   · 12/26/17 - Elastic stockings prescribed for lower extremity edema.   · 1/8/18 - Patient hospitalized at [facility] between 1/8/18 and 1/11/18 with fever of up to 101 degrees and painful rash on the lower extremities of several weeks’ duration. She was found to be hypokalemic and MRI of the lower extremities revealed thickening and swelling. Chest x-ray had no acute cardiopulmonary abnormality. Skin biopsy was performed by  Surgery revealing stasis dermatitis and no evidence of malignancy. Infectious Disease consultation was obtained and IV antibiotics were stopped. Blood cultures had no growth.   · 1/22/18 - Patient asked to start wearing elastic stockings which she has not started yet. She was given a prescription for Dyazide 1 p.o. daily.   · 2/26/18 - Ordered chemo to resume again. Patient unaware that she was supposed to resume chemo after her last visit in January. Continue magnesium infusions on day 1, 8 and 15. Prescribed Levaquin 500 mg p.o. daily x10 days for productive cough x1 week. History of viral illness consistent with influenza.  of 18 (0-35). Chest x-ray with no acute process.    · 3/5/18 - Cycle 12 chemotherapy started.   · 3/26/18 - Options discussed with patient. Decision made to discontinue IV Gemzar and orders written to start on Niraparib (Zejula) 300 mg p.o. daily as maintenance therapy.   · 4/11/18 - Niraparib discontinued due to insurance denial. Orders written to start Carboplatin-AUC 5 IV day 1 cycling every 21 days. Orders written for Neulasta 6 mg subcutaneous kit day 1 with palliative treatment intent and expected duration of treatment three months.   · 4/12/18 - CT chest, abdomen and pelvis with contrast revealed numerous tiny centrilobular nodules in the lungs favored to reflect infectious or inflammatory process. Nodules ranged 1-3 mm in size and are new from prior studies with metastatic disease not entirely excluded. Stable small hiatal hernia. Interval progression of metastatic disease involving the subcutaneous tissues at the ventral abdominal wall. Multiple soft tissue density nodules previously shown to be hypermetabolic on PET scan. New small crescent of low attenuation material along the periphery of the spleen with similar finding at the dome of the liver. Findings are concerning for peritoneal metastases. Density of the dome of the liver remained unchanged from multiple prior studies.  Stable calcified mass in the left pelvic sidewall. Urinary bladder wall thickening.   · 4/23/18 - Cycle 1 chemotherapy with Carboplatin administered along with Neulasta injection.   · 4/26/18 - Neulasta discontinued due to insurance denial.   · 5/14/18 - Patient received cycle 2 Carboplatin.   · 6/5/18 - Cycle 3 day 1 chemotherapy with Carboplatin administered.   · 6/20/18 - CT chest, abdomen and pelvis at [facility] showed re-demonstration of soft tissue mass in the ventral wall hernia left of the midline as well as the dome of the liver, likely representing metastatic disease similar as compared to the prior study. Additional calcified lesions present in the upper left hemipelvis and along the anterior abdominal wall to the right of the midline also likely representing metastatic disease. No definite new lesions were identified. Moderate to large stool burden likely related to mild constipation was present. No suspicious lung nodules were identified. The previously-described ground glass nodules appeared to have resolved. There was a stable subpleural nodule in the right upper lobe measuring 3 mm present since 2014 without significant changes.   · 6/26/18 - Cycle 4 day 1 Carboplatin administered with addition of Neulasta for febrile neutropenia prophylaxis.   · 6/29/18 - Reviewed CT scans with patient. Orders written to discontinue Carboplatin and Neulasta and plan to start Niraparib 300 mg by mouth daily as maintenance therapy. Prescribed Amlodipine 2.5 mg p.o. daily for chemo-induced hypertension.   · 7/18/18 - Orders written to increase weekly Magnesium Sulfate to 3 g IV weekly due to continued hypomagnesemia.   · 8/1/18 - Patient reports she has not received Niraparib (Zejula) to date.   · 8/30/18 - Patient’s phone was not working so though Niraparib approved, the drug company had not been able to get ahold of her. Patient supplied with drug company number so that she can start taking the pills.    · 9/6/18 -  of 20 (N).   · 9/18/18 - Urinalysis done for dysuria and back pain. Urine culture grew 20,000 to 50,000 colonies of urogenital ruy. Prescribed Bactrim DS one tablet twice daily for one week.   · September 2018 - Patient initiated on Zejula 300 mg p.o. daily.   · 10/1/18 - WBC 3.5, hemoglobin 12, platelet count 74,000. Niraparib (Zejula) dose held and then resumed when platelets above 100,000 at a dose of 200 mg daily.   · 10/15/18 - Patient received Niraparib (Zejula) at 200 mg p.o. daily with a platelet count of 198,000.   · 11/7/18 - WBC 6, hemoglobin 11.6, MCV 96.2, platelet count 137,000. Patient claims to have stopped taking Niraparib a few days prior because of cough. Asked to resume taking it. Ciprofloxacin 500 mg p.o. twice daily for one week for bronchitis.   · 12/3/18 - Magnesium Sulfate infusions changed to every two weeks, to send mag level before and give 3 grams. DC’d Magnesium Oxide and started Magnesium Plus Protein two pills twice daily.   · 1/4/19 - CT chest, abdomen and pelvis with new patchy nodular areas of airspace consolidation within the bilateral upper lobes, left greater than right, favored to be infectious or inflammatory. Interval enlargement of the peritoneal soft tissue masses within the pelvis compatible with progression of disease. Enlarging ventral hernia now containing multiple loops of small bowel and colon.   · 1/7/19 - Patient has not been coming in for weekly magnesium replacement as nursing has not been able to get ahold of her. Results of CT’s discussed with patient. Decision made to change her to IV chemotherapy with Carboplatin AUC-5 day 1 and pegylated liposomal Doxorubicin (Doxil) 30 mg/M2 IV day 1 to cycle every four weeks. Patient made aware of the limited choices of her treatment available.   · 1/31/19 - Echocardiogram showed LVEF 50-55% and otherwise normal echo Doppler study.   · 2/4/19 - New chemotherapy not initiated to date with difficulty  contacting patient for echocardiogram. Neulasta On-Pro 6 mg ordered with chemotherapy due to extensive prior exposure to chemotherapy, increasing risk of febrile neutropenia, history of neutropenic events on prior chemotherapy regimens.   · 2/15/19 - Patient started cycle 1 chemotherapy with Neulasta support.   · 3/6/19 -  of 28 (0-35).   · 3/15/19 - Cycle 2 Carboplatin with Liposomal Doxorubicin (Doxil) and Neulasta support initiated.     · 4/10/19 - Patient was requested to followup with Dr. Queen regarding hypotension and blood pressure medication dosing. Patient appears to be tolerating treatment well with cytopenias not requiring dose adjustments. No Doxil-related skin or mucus membrane toxicities or other significant toxicities to date.   · 4/12/19 - Cycle 3 chemotherapy given.   · 4/16/19 - CT chest, abdomen and pelvis with no evidence of active metastasis to the chest. Several tree-in-bud nodules within the periphery of the inferior right upper lobe likely infectious or inflammatory. Disease burden within abdomen and pelvis stable to slightly increased from prior CT. Specifically, a soft tissue mass along the right rectus muscle slightly increased in bulk. Multiple ventral hernias, some containing loops of bowel, with no evidence of acute bowel obstruction.   · 5/8/19 - Discussed CT results with patient. Overall stable disease and would like to continue same treatment. Dove soap and Hydrocortisone Cream p.r.n. prescribed for scattered rash on back.   · 5/10/19 - Cycle 4 Carboplatin and Liposomal Doxorubicin initiated.   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 It was ER positive, HER2/zuleima negative, PD-L1 negative. There was TOPO1 positivity and TUBB3 positivity. TMB was low (two mutations per megabyte MUTS/MB) and MSI was stable. There were 13 therapies considered with increased benefit. The 13 therapies with increased benefit  included Fulvestrant, Irinotecan, Letrozole, Topotecan, Anastrazole, Exemestane, Tamoxifen, all of which were referenced to [facility] as well as Abemaciclib, Everolimus, Gefitinib, Palbociclib, Ribociclib and Toremifene.     · 6/5/19 echocardiogram with preserved LV systolic function with EF around 60%.  · 6/7/19 cycle 5 chemotherapy with Neulasta support given.  Patient continued on mag oxide 400 mg p.o. twice daily and weekly IV 3 g mag sulfate infusions for persistent hypomagnesemia.  · 7/5/2019- cycle 6 chemotherapy with carboplatin and doxorubicin with Neulasta port initiated.  Ca125:  21 (0-35).  · 7/23/2019-CT chest abdomen and pelvis compared to CT from 4/16/2019 revealed multiple small pulmonary nodules unchanged from prior examination within the right upper and left lower lobes.  Complex ventral hernia similar to prior exam.  Soft tissue nodule along the right lateral margin of the right of the midline measuring 12 x 10 mm unchanged in size.  Irregular soft tissue nodular thickening along the margin of the most inferior aspect of the complex ventral hernia with thickness measuring up to 13 mm similar to prior examination.  Extensive calcified and soft tissue mass within the left lower quadrant decrease in size now measuring 2.6 x 2.3 cm previously 3.0 x 2.5 cm.  Partially calcified mass involving the right rectus sheath measuring 3.1 x 2.7 cm previously measured 3.3 x 2.9 cm.  Densely calcified mass measuring 2.1 x 1.6 cm unchanged previously measured 2 x 1.7 cm.  Right external iliac chain lymph node measuring 9 mm previously measured 5 mm.  · 8/2/2019 cycle 7 chemotherapy given.  · 8/30/2019-cycle 8 chemotherapy with carboplatin and doxorubicin with Neulasta support given.  · 9/27/2019 cycle 9 chemotherapy given.  · 10/1/19 - Echocardiogram showed Normal LV size and contractility EF of 60-65%. Normal RV size. Borderline left atrial enlargement. Aortic valve, mitral valve, tricuspid valve appears structurally  normal, no significant regurgitation seen.No pericardial effusion seen. Proximal aorta appears normal in size.  · 10/18/19 - Magnesium 1.5 (1.8-2.5).  IV magnesium replacement continued.  · 10/25/2019 cycle 10 chemotherapy given.  · 10/29/2019 patient had CT scan of the chest abdomen and pelvis-there is a new 2 mm nodule in the left lower lobe with a stable 2 mm nodule in the left lower lobe.  Follow-up in 6 months was recommended.  Stable multiple soft tissue density and calcified nodules within the ventral abdominal wall and left retroperitoneal fat consistent with nonviable metastatic disease.  These nodules have either decreased in size or stable.  · 11/22/2019 10 received cycle 11 of chemotherapy with Doxil and carboplatinum with Neulasta  · 12/8/2019 to 12/11/2019 patient was admitted to the hospital for neutropenic fever.  · 12/20/2019 patient received cycle 12 of chemotherapy with Doxil and carboplatinum with Neulasta  · 1/13/20: 2 D echo was normal with EF 56% to 60%  · 1/24/20-Patient received cycle 13 of combination chemotherapy with carboplatinum and Doxil  · 2/3/2020 patient had a  which was 25.4  · 2/21/2020-patient received cycle 14 of chemotherapy with carboplatinum and Doxil  · 3/6/2020 patient had CT scan of the chest, abdomen and pelvis this revealed a 3 mm nodule in the left lower lobe present on prior CT of the abdomen unchanged from July 2019.  Stable 3 mm right upper lobe nodule.  There is a lymph node in the AP window measuring 8 x 9 mm prominent left hilar lymph node measuring 12 mm previously was 7 x 7 mm no significant adenopathy was seen in the abdomen there is an area of irregular soft tissue in the left paramidline ventral hernia which is stable measuring 5.7 cm partially calcified mass in the right rectus measuring 3 x 2.5 cm which is also stable the prominent lymph nodes in the left mid hilum and mediastinum may be reactive or metastatic disease  · 4/17/2020-patient had cycle 15  of single agent carboplatinum  · 5/26/2020 patient had CT scan of the chest, abdomen and pelvis showed 3 new lung nodules measuring 7 mm in the left upper lobe, 5 mm in the left lower lobe and 4 mm in the right lower lobe.  There were additional small lung nodules which were unchanged.  Mildly prominent mediastinal and bilateral hilar lymph nodes mildly increased in size.  Right hilar node 9 mm previously was 5 mm.  Carinal node 9 mm previously was 6 mm.  The nodule at the right side of the hernia sac has increased to 1.5 cm from 1.2 cm.  Also a nodule in the subcutaneous fat currently measures 2.4 was previously 2 cm.  · 5/15/2020-patient received cycle 16 of carboplatinum  · Since the last visit in the office patient patient developed dizzy spell and fell. For that reason she was taken to the emergency room at [place].    · 7/3/2020 she had brain MRI which showed an 8 mm focus of abnormality in the left aspect of the posterior fossa corresponding to a dural based enhancing lesion thought to represent a calcified meningioma vessels calcified dural based metastasis.  This lesion has a slight local mass-effect and a small amount of surrounding edema.  There is also a 4 to 5 mm rounded focus of abnormal nodular enhancement along the cortex of the left parietal lobe but no significant mass-effect.  This is nonspecific could represent neoplastic process, infectious/inflammatory process or granulomatous disease.  · Patient was seen by neurosurgery, follow-up brain MRI in 8 weeks has been recommended by Dr. Hartman  · 7/9/2020 patient was initiated on single agent topotecan cycle 1 day 1  · 7/16/2020 she received the 8 topotecan       2. Deep vein thrombosis of left upper extremity diagnosis established in October of 2013.  · 10/16/13 - Venous Doppler of left upper extremity.  Impression:  Deep vein thrombosis involving the subclavian, axillary and brachial veins on the left side, superficial vein thrombosis involving the left  basilic vein.  · 10/16/13 - Order written for Lovenox 120 mg subcutaneously daily until INR is in therapeutic range.  Order written for Coumadin 5 mg p.o. daily.  The patient is to follow PT and INR protocol.  · 5/20/16 - Venous Doppler bilateral lower extremities normal.  · 7/30/19 - Patient continued on Coumadin following the INR protocol.      3. Anemia diagnosis established in November 2013 and pernicious anemia diagnosis established March 2016.   · 11/15/13 - Hemoglobin is 7.8.  · 12/2/13 - Orders written to start Aranesp 200 mg subcutaneous every two weeks due to low hemoglobin secondary to chemo induced anemia.  Hemoglobin is 9.7.  Anemia workup is ordered.  · 12/03/13 - Ferritin 179.8 (N), folic acid 8.3 (N), vitamin B12 314, iron 104 (N), TIBC 298 (N), iron saturation 35 (N), Reticulocyte count 0.88 (N).  EPO level 124 (N).  Haptoglobin 22 (H).  · 10/22/14 - Hemoglobin 12.5, hematocrit 37.3, MCV 91.6.  · 10/23/14 - Anemia resolved.   · 3/8/16 - WBC 5.8, hemoglobin 11.7, MCV 90.3, platelet count 245,000. Vitamin B12 of 189 (180-914), ferritin 61 (), iron 91 (), TIBC 345 (228-428).   · 3/15/16 -  ().        · 3/22/16 - Intrinsic factor antibody positive, antiparietal antibody negative. Vitamin B12 at 1000 mcg IM weekly started, but the patient after receiving first dose on 3/22/16 did not come back for injections until 4/13/16.   · 4/13/16 - WBC 5.1, hemoglobin 12.5, MCV 87.9, platelet count 201,000. Patient given B12 injection and then continued at home.   · 5/10/16 - WBC 5.1, hemoglobin 12.5, platelet count 201,000.   · 6/14/16 - Monthly Vitamin B12 injections continued at home.   · 7/11/16 - WBC 5.48, hemoglobin 12.7, MCV 86.2, platelet count 200,000.   · 8/16/16 - Patient continued on monthly Vitamin B12 injections at home.   · 1/5/17 - WBC 2.6 with 38% neutrophils, 56% lymphocytes, 5% monocytes, hemoglobin 10.5, .3, platelet count 63,000. Patient continued on Vitamin B12  injections 1000 mcg IM monthly at home.   · 3/20/17 - Retic 0.41 (0.5-1.5), creatinine 1.0 (0.4-1.0). Iron 12 (), TIBC 270 (228-428), ferritin 259 (), haptoglobin 340 (), TSH 0.43 (0.34-5.6), folate 6.0 (5.9-24.8). Serum protein electrophoresis revealed decreased gammaglobulins. Serum EDU had no monoclonal gammopathy identified. Stool Hemoccult was negative.   · 6/8/17 - WBC 6.3, hemoglobin 8.3, MCV 92.4, platelet count 50,000. Procrit 40,000 units subq weekly added for chemotherapy-induced anemia. Patient continued on Vitamin B12 at 1000 mcg IM monthly at home.   · 7/5/17 - Iron 33 (), TIBC 328 (228-428), ferritin 211 (), TSH 2.46 (0.34-5.6).       · 7/31/17 - WBC 5.0, hemoglobin 11.2, MCV 89.7, platelet count 217,000. We will continue with the Procrit 40,000 units subq weekly for chemo-induced anemia when the hemoglobin falls below 10.0 and the patient is also to continue with the Vitamin B12 at 1000 mcg IM monthly at home. Creatinine 1.24 (0.5-0.99).    · 8/28/17 - WBC 9.6, hemoglobin 8.7, MCV 93.7, platelet count 133,000. Patient is to continue with the Procrit 40,000 units subq weekly and will receive that today. She is also to continue with the Vitamin B12 at 1000 mcg IM monthly at home.  · 10/16/17 - WBC 7.5, hemoglobin 9.6, MCV 98.4, platelet count 195,000. Patient is to continue with Procrit 40,000 units subq weekly and will receive Procrit today.   · 1/1/18 - Creatinine 1.3 (0.4-1.0).    · 2/19/18 - Procrit given for hemoglobin 9.9.   · 2/26/18 - Continue Procrit 40,000 units weekly p.r.n. hemoglobin <10. Continue Vitamin B12 at 1000 mcg IM monthly at home.   · 3/26/18 - Hemoglobin 8.3. Procrit dose increased to 60,000 units weekly. Iron 29 (), TIBC 306 (228-428), and iron sat 9% (15-50). Iron 29 (), TIBC 306 (228-428), iron saturation 9% (15-50).   · 3/27/18 - Order written to start Ferrous sulfate 325 mg p.o. b.i.d.    · 4/2/18 - Stool heme negative x3.    · 4/30/18 - Patient had not started Ferrous sulfate 325 mg p.o. b.i.d. and verbalized understanding to do so and to notify the office if side effects are not tolerable.   · 5/24/18 - Patient was hospitalized at [facility] between 5/24/18 and 5/26/18 with dark tarry stool. INR was subtherapeutic. She was given IV Protonix and Protonix 40 mg daily. She underwent an EGD by David Rogers M.D. revealing small hiatal hernia, small submucosal nodule near the cardia, erosive gastritis. Pathology on gastric antrum and body biopsy revealed iron pill gastritis and no evidence of Helicobacter pylori. She then underwent a colonoscopy revealing diverticulosis, hemorrhoids and surgical changes from previous resection. It was recommended to consider outpatient M2A if hemoglobin continued to drop.   · 6/4/18 - Order written to discontinue iron pills and to use Injectafer 750 mg IV days 1 and 8 for low iron sats. Order written for Procrit 40,000 units subq weekly. Iron 65 (), TIBC 328 (228-428), iron saturation 20% (15-50), ferritin 123 ().   · 6/29/18 - Discussed patient’s intolerance of oral iron due to gastritis. Oral iron discontinued with plans for Injectafer should iron level drop in the future.   · 11/7/18 - Patient claims not to be taking Vitamin B12 injections at home. Asked to resume those.   · 12/3/18 - Patient reports she has not been taking her B12 injections for a couple of months. She needs some syringes and needles for that.   · 2/4/19 - Patient is uncertain if she is currently taking Ferrous Sulfate or not. Patient with history of intolerance and will follow hemoglobin.   · 5/8/19 - Ferritin 64 ().  · 8/27/2019- iron 52 (), iron saturation 14% (15-50), TIBC 364 (228-420), and ferritin 74 ().  Injectafer 750 mg IV day 1 and 8 due to oral iron intolerance ordered.  · 8/30/2019- Injectafer 750 mg IV day 1 given.  Hemoglobin 10.8.  At the end of the infusion heart rate was 48 and the  patient reported mild dizziness.  Patient was sent to the ED for evaluation with symptoms resolving rapidly.  Chest x-ray and CT head were negative for acute findings.  · 9/6/2019-Injectafer 750 mg IV day 8 given without adverse effects.  Hemoglobin 9.8.   · 9/25/19 - Iron 85 (). Iron saturation 25 (15-50). TIBC 335 (228-428). Ferritin  694 ().  Hemoglobin 10.3.  · 10/25/2019 hemoglobin 9.7.  Patient started on Retacrit 40,000 units subcu weekly.    Past Medical History:   Diagnosis Date   • Anemia 2013   • Cervical disc disorder 2013    Herniated disc, pinched nerve   • Clotting disorder (CMS/HCC) 2013    Low platelets from chemo   • Colon polyp 2013   • Deep vein thrombosis (CMS/HCC) 2013   • Diverticulosis 2013   • GERD (gastroesophageal reflux disease) 2016   • HL (hearing loss) 2016    I need hearing aids   • Hyperlipidemia 2013    Need my cholesterol rechecked   • Hypertension    • Joint pain 2013    Shoulder pain, torn rotator cuff   • Low back pain 2013   • Neuromuscular disorder (CMS/HCC) 2015    Shingles, pinched nerves in my neck   • Osteopenia 2014   • Osteoporosis    • Ovarian cancer (CMS/HCC)    • Pneumonia 2010    Have had it several times   • Spinal stenosis 2013    Cervical   • Urinary tract infection 2013    Have had several utis       Past Surgical History:   Procedure Laterality Date   • COLON SURGERY     • COLONOSCOPY  05/26/2018   • EXPLORATORY LAPAROTOMY     • HERNIA REPAIR     • HYSTERECTOMY     • OOPHORECTOMY           Current Outpatient Medications:   •  acetaminophen (TYLENOL) 500 MG tablet, Take 1,000 mg by mouth Every 6 (Six) Hours As Needed for Mild Pain ., Disp: , Rfl:   •  albuterol sulfate  (90 Base) MCG/ACT inhaler, Inhale 2 puffs Every 6 (Six) Hours As Needed for Wheezing., Disp: 6.7 g, Rfl: 0  •  atorvastatin (LIPITOR) 20 MG tablet, Take 20 mg by mouth every night at bedtime., Disp: , Rfl: 3  •  cyanocobalamin 1000 MCG/ML injection, Inject 1,000 mcg into the  "appropriate muscle as directed by prescriber Every 28 (Twenty-Eight) Days., Disp: , Rfl:   •  famotidine (PEPCID) 40 MG tablet, TAKE 1 TABLET BY MOUTH EVERY MORNING BEFORE BREAKFAST, Disp: 90 tablet, Rfl: 1  •  KLOR-CON 20 MEQ CR tablet, Take 3 tablets by mouth every morning and take 2 tablets by mouth at bedtime., Disp: 150 tablet, Rfl: 5  •  loperamide (Imodium A-D) 2 MG tablet, Take 1 tablet by mouth 3 (Three) Times a Day As Needed for Diarrhea., Disp: 60 tablet, Rfl: 2  •  magnesium oxide (MAG-OX) 400 MG tablet, TAKE 1 TABLET BY MOUTH TWICE A DAY, Disp: 180 tablet, Rfl: 1  •  methylPREDNISolone (MEDROL, JONES,) 4 MG tablet, Take as directed on package instructions., Disp: 1 each, Rfl: 0  •  ondansetron ODT (ZOFRAN-ODT) 8 MG disintegrating tablet, Take 8 mg by mouth Every 8 (Eight) Hours As Needed for Nausea or Vomiting., Disp: , Rfl:   •  oxyCODONE (ROXICODONE) 10 MG tablet, Take 1 tablet by mouth 3 (Three) Times a Day As Needed for Moderate Pain ., Disp: 90 tablet, Rfl: 0  •  pregabalin (LYRICA) 100 MG capsule, Take 1 capsule by mouth 3 (Three) Times a Day., Disp: 90 capsule, Rfl: 2  •  sertraline (ZOLOFT) 50 MG tablet, TAKE 1 TABLET BY MOUTH EVERY EVENING BEFORE BED, Disp: 90 tablet, Rfl: 1  •  Syringe 23G X 1\" 3 ML misc, INJECT 1 ML B-12 ONCE MONTHLY, Disp: 1 each, Rfl: 3  •  temazepam (RESTORIL) 30 MG capsule, TAKE 1 CAPSULE BY MOUTH AT NIGHT AS NEEDED FOR SLEEP., Disp: 30 capsule, Rfl: 1  •  triamterene-hydrochlorothiazide (DYAZIDE) 37.5-25 MG per capsule, TAKE 1 CAPSULE BY MOUTH EVERY DAY, Disp: 90 capsule, Rfl: 1  •  warfarin (COUMADIN) 1 MG tablet, Take 1 tablet by mouth Daily., Disp: 45 tablet, Rfl: 2  •  warfarin (COUMADIN) 5 MG tablet, Active thrombosis  Indications: DVT/PE (active thrombosis) (Patient taking differently: Active thrombosis  Indications: DVT/PE (active thrombosis), On hold due to surgery), Disp: 30 tablet, Rfl: 0  No current facility-administered medications for this visit. "     Facility-Administered Medications Ordered in Other Visits:   •  heparin injection 500 Units, 500 Units, Intravenous, PRN, Cindy Ball MD  •  sodium chloride 0.9 % flush 20 mL, 20 mL, Intravenous, PRNQuinn Ifeoma Roseline, MD, 20 mL at 07/23/20 0938    Allergies   Allergen Reactions   • Morphine Nausea Only and Hives   • Penicillin V Potassium Rash   • Sulfa Antibiotics Rash   • Levaquin [Levofloxacin] Nausea Only and Dizziness     When taken with Coumadin   • Amoxicillin Rash   • Norco [Hydrocodone-Acetaminophen] Rash       Family History   Problem Relation Age of Onset   • Hypertension Mother    • Cancer Father    • Hypertension Father    • Hypertension Sister    • Hypertension Brother    • Stroke Brother        Cancer-related family history includes Cancer in her father.    Social History     Tobacco Use   • Smoking status: Never Smoker   • Smokeless tobacco: Never Used   Substance Use Topics   • Alcohol use: No     Frequency: Never     Comment: caffeine 32-64oz qd   • Drug use: No       I have reviewed and confirmed the accuracy of the patient's history: Chief complaint, HPI and ROS as entered by the MA/LPN/RN. Cindy Ball MD 07/23/20     SUBJECTIVE:    The patient is here for a follow up appointment.  Has not had any other issues since her last visit.  She has tolerated 2 weeks of topotecan well with no significant other toxicities.  Today her counts are low but she denies any bleeding issues      REVIEW OF SYSTEMS:  Review of Systems   Constitutional: Negative for chills and fever.   HENT: Negative for ear pain, mouth sores, nosebleeds and sore throat.    Eyes: Negative for photophobia and visual disturbance.   Respiratory: Negative for wheezing and stridor.    Cardiovascular: Negative for chest pain and palpitations.   Gastrointestinal: Negative for abdominal pain, diarrhea, nausea and vomiting.   Endocrine: Negative for cold intolerance and heat intolerance.   Genitourinary:  "Negative for dysuria and hematuria.   Musculoskeletal: Negative for joint swelling and neck stiffness.   Skin: Negative for color change.   Neurological: Negative for seizures and syncope.   Hematological: Negative for adenopathy.   Psychiatric/Behavioral: Negative for agitation, confusion and hallucinations.     ROS as documented above    OBJECTIVE:    Vitals:    07/23/20 0953   BP: 95/60   Pulse: 57   Resp: 20   Temp: 97.5 °F (36.4 °C)   TempSrc: Oral   Weight: 80.7 kg (178 lb)   Height: 165.1 cm (65\")   PainSc: 0-No pain       ECOG  (1) Restricted in physically strenuous activity, ambulatory and able to do work of light nature    Physical Exam   Constitutional: She is oriented to person, place, and time. No distress.   HENT:   Head: Normocephalic and atraumatic.   Eyes: Conjunctivae and EOM are normal. Right eye exhibits no discharge. Left eye exhibits no discharge. No scleral icterus.   Neck: Normal range of motion. Neck supple. No thyromegaly present.   Cardiovascular: Normal rate, regular rhythm and normal heart sounds. Exam reveals no gallop and no friction rub.   Pulmonary/Chest: Effort normal. No stridor. No respiratory distress. She has no wheezes.   Abdominal: Soft. Bowel sounds are normal. She exhibits no mass. There is no tenderness. There is no rebound and no guarding.   Musculoskeletal: Normal range of motion. She exhibits no tenderness.   Lymphadenopathy:     She has no cervical adenopathy.   Neurological: She is alert and oriented to person, place, and time. She exhibits normal muscle tone.   Skin: Skin is warm. She is not diaphoretic. No erythema.   Psychiatric: She has a normal mood and affect. Her behavior is normal. Judgment and thought content normal.   Nursing note and vitals reviewed.    PE as documented above    RECENT LABS  WBC   Date Value Ref Range Status   07/23/2020 1.94 (L) 3.40 - 10.80 10*3/mm3 Final   07/05/2020 4.04 (L) 4.5 - 11.0 10*3/uL Final     RBC   Date Value Ref Range Status "   07/23/2020 3.06 (L) 3.77 - 5.28 10*6/mm3 Final   07/05/2020 3.68 (L) 4.0 - 5.2 10*6/uL Final     Hemoglobin   Date Value Ref Range Status   07/23/2020 9.6 (L) 12.0 - 15.9 g/dL Final   07/05/2020 11.5 (L) 12.0 - 16.0 g/dL Final     Hematocrit   Date Value Ref Range Status   07/23/2020 29.5 (L) 34.0 - 46.6 % Final   07/05/2020 35.3 (L) 36.0 - 46.0 % Final     MCV   Date Value Ref Range Status   07/23/2020 96.4 79.0 - 97.0 fL Final   07/05/2020 95.9 80.0 - 100.0 fL Final     MCH   Date Value Ref Range Status   07/23/2020 31.4 26.6 - 33.0 pg Final   07/05/2020 31.3 26.0 - 34.0 pg Final     MCHC   Date Value Ref Range Status   07/23/2020 32.5 31.5 - 35.7 g/dL Final   07/05/2020 32.6 31.0 - 37.0 g/dL Final     RDW   Date Value Ref Range Status   07/23/2020 14.9 12.3 - 15.4 % Final   07/05/2020 15.9 12.0 - 16.8 % Final     RDW-SD   Date Value Ref Range Status   07/23/2020 49.4 37.0 - 54.0 fl Final     MPV   Date Value Ref Range Status   07/23/2020 10.5 6.0 - 12.0 fL Final   07/05/2020 10.2 6.7 - 10.8 fL Final     Platelets   Date Value Ref Range Status   07/23/2020 34 (C) 140 - 450 10*3/mm3 Final   07/05/2020 158 140 - 440 10*3/uL Final     Neutrophil Rel %   Date Value Ref Range Status   07/05/2020 54.0 45 - 80 % Final     Neutrophil %   Date Value Ref Range Status   07/23/2020 29.5 (L) 42.7 - 76.0 % Final     Lymphocyte Rel %   Date Value Ref Range Status   07/05/2020 30.4 15 - 50 % Final     Lymphocyte %   Date Value Ref Range Status   07/23/2020 58.2 (H) 19.6 - 45.3 % Final     Monocyte Rel %   Date Value Ref Range Status   07/05/2020 12.4 0 - 15 % Final     Monocyte %   Date Value Ref Range Status   07/23/2020 8.2 5.0 - 12.0 % Final     Eosinophil %   Date Value Ref Range Status   07/23/2020 3.1 0.3 - 6.2 % Final   07/05/2020 3.0 0 - 7 % Final     Basophil Rel %   Date Value Ref Range Status   07/05/2020 0.2 0 - 2 % Final     Basophil %   Date Value Ref Range Status   07/23/2020 1.0 0.0 - 1.5 % Final     Immature  Grans %   Date Value Ref Range Status   07/05/2020 0.0 0 % Final     Neutrophils Absolute   Date Value Ref Range Status   07/05/2020 2.18 2.0 - 8.8 10*3/uL Final     Neutrophils, Absolute   Date Value Ref Range Status   07/23/2020 0.57 (L) 1.70 - 7.00 10*3/mm3 Final     Lymphocytes Absolute   Date Value Ref Range Status   07/05/2020 1.23 0.7 - 5.5 10*3/uL Final     Lymphocytes, Absolute   Date Value Ref Range Status   07/23/2020 1.13 0.70 - 3.10 10*3/mm3 Final     Monocytes Absolute   Date Value Ref Range Status   07/05/2020 0.50 0.0 - 1.7 10*3/uL Final     Monocytes, Absolute   Date Value Ref Range Status   07/23/2020 0.16 0.10 - 0.90 10*3/mm3 Final     Eosinophils Absolute   Date Value Ref Range Status   07/05/2020 0.12 0.0 - 0.8 10*3/uL Final     Eosinophils, Absolute   Date Value Ref Range Status   07/23/2020 0.06 0.00 - 0.40 10*3/mm3 Final     Basophils Absolute   Date Value Ref Range Status   07/05/2020 0.01 0.0 - 0.2 10*3/uL Final     Basophils, Absolute   Date Value Ref Range Status   07/23/2020 0.02 0.00 - 0.20 10*3/mm3 Final     Immature Grans, Absolute   Date Value Ref Range Status   07/05/2020 0.00 <1 10*3/uL Final     nRBC   Date Value Ref Range Status   07/05/2020 0 0 /100(WBC) Final   12/11/2019 0.3 (H) 0.0 - 0.2 /100 WBC Final       Lab Results   Component Value Date    GLUCOSE 80 07/09/2020    BUN  07/09/2020      Comment:      Testing performed by alternate method    BUN 14 07/09/2020    CREATININE 0.87 07/09/2020    EGFRIFNONA 66 07/09/2020    EGFRIFAFRI >60 06/07/2019    BCR  07/09/2020      Comment:      Testing not performed    K 3.8 07/09/2020    CO2 28.0 07/09/2020    CALCIUM 9.7 07/09/2020    ALBUMIN 4.00 07/09/2020    LABIL2 1.3 07/03/2020    AST 21 07/09/2020    ALT 10 07/09/2020         Assessment/Plan     Pernicious anemia  - CBC & Differential    Malignant neoplasm of right ovary (CMS/HCC)  - CBC and Differential      ASSESSMENT:    1. Recurrent ovarian cancer on carboplatinum, Doxil.   Patient had had carboplatinum Taxol, carbo Taxotere, Gemzar single agent, Niraparib and was on Doxil and carboplatinum.  Doxil was discontinued due to approaching of lifetime dosing.  Refer to paradigm testing for future options at time of progression.  Patient has had progression of disease and therefore will platinum was discontinued.  Topotecan single agent has been recommended  2. Currently on single agent topotecan  3. Nonspecific lesions in the brain concern for possible small metastases 4 to 5 mm on the left parietal lobe, 8 mm nodular density in the dura on the left posterior fossa may be meningioma or calcified metastasis: Neurosurgery following Dr. Hartman.  Repeat brain MRI in 8 weeks  4. Iron deficiency anemia: Intermittently on  iron  5. Pancytopenia: Due to chemotherapy: On Procrit  6. Hypomagnesemia: Continue to monitor levels  7. B12 deficiency on parenteral B12 injections  8. Mucositis due to chemotherapy: resolved  9. Rash on face likely drug induced  10. Tumor progression  11. Pancytopenia secondary to chemotherapy  12. Monitoring for chemotherapy toxicities  13. Pancytopenia secondary to chemotherapy with topotecan      DISCUSSION:    I have reviewed her restaging CT scans.  Patient has new lung nodules and also enlarging nodules in the abdomen and pelvis consistent with progressing disease.  For that reason we will discontinue single agent carboplatinum and transition patient to topotecan.  Also note patient was on carboplatinum every 4 weeks with Doxil.  Doxil was discontinued due to approaching lifetime maximum dosing.  In the future carboplatinum may be reused but at 3 weekly intervals.    Discussed side effects of chemotherapy to include but not limited to  Chemotherapy side effects include, but not limited to, nausea, vomiting, bone marrow suppression, which can result in blood, platelet transfusion. There is also risk of permanent bone marrow destruction, which can cause myelodysplastic side  effects of her new chemotherapy or leukemia years down the line. There is risk of infection which can result in hospitalization and even death. There is also risk of fatigue, asthenia, alopecia which could become permanent. Chemo will help to reduce risk of relapse of cancer, but does not eliminate risk completely.    Specifically topotecan can lead to severe bone marrow suppression which may result in platelet transfusions    PLANS:       Refered to Dr. Delong for possible brain metastasis  Continue chemotherapy with topotecan but with 20% dose reduction due to cytopenias: Order given  Brain MRI follow-up in 6 to 8 weeks: Dr Hartman Neurosurgery   monthly: Check levels today.  Ongoing  Continue magnesium oxide 400 mg three times a day and weekly IV replacement as needed.   Continue Coumadin  Continue b12 injections IM monthly. Transitioned injections to be done at the cancer center  Continue Retacrit 40,000 units subcu weekly for hemoglobin less than 10, For chemotherapy-induced anemia  Magic mouthwash PRN for mucositis  All questions answered to the best of my abilities         I have reviewed labs results, imaging, vitals, and medications with the patient today. Will follow up in 3 weeks  with me.    All questions answered to the best of my abilities    Patient verbalized understanding and is in agreement of the above plans.      I spent greater than 45 minutes in face-to-face time with the patient and 95% of that time were spent in counseling and coordination of care, also time was spent in discussing side effects of her new chemotherapy, including review of imaging and pathology; indications for treatment, goals of therapy, alternatives and risks - both common and rare - as well as surveillance and potential outcomes.

## 2020-07-23 ENCOUNTER — HOSPITAL ENCOUNTER (OUTPATIENT)
Dept: ONCOLOGY | Facility: HOSPITAL | Age: 65
Setting detail: INFUSION SERIES
Discharge: HOME | End: 2020-07-23

## 2020-07-23 ENCOUNTER — LAB (OUTPATIENT)
Dept: LAB | Facility: HOSPITAL | Age: 65
End: 2020-07-23

## 2020-07-23 ENCOUNTER — OFFICE VISIT (OUTPATIENT)
Dept: ONCOLOGY | Facility: CLINIC | Age: 65
End: 2020-07-23

## 2020-07-23 VITALS
RESPIRATION RATE: 20 BRPM | HEART RATE: 57 BPM | BODY MASS INDEX: 29.66 KG/M2 | DIASTOLIC BLOOD PRESSURE: 60 MMHG | WEIGHT: 178 LBS | SYSTOLIC BLOOD PRESSURE: 95 MMHG | TEMPERATURE: 97.5 F | HEIGHT: 65 IN

## 2020-07-23 VITALS
SYSTOLIC BLOOD PRESSURE: 95 MMHG | HEART RATE: 57 BPM | BODY MASS INDEX: 29.66 KG/M2 | TEMPERATURE: 97.5 F | DIASTOLIC BLOOD PRESSURE: 60 MMHG | HEIGHT: 65 IN | RESPIRATION RATE: 20 BRPM | WEIGHT: 178 LBS

## 2020-07-23 DIAGNOSIS — E83.42 HYPOMAGNESEMIA: Primary | ICD-10-CM

## 2020-07-23 DIAGNOSIS — C56.1 MALIGNANT NEOPLASM OF RIGHT OVARY (HCC): ICD-10-CM

## 2020-07-23 DIAGNOSIS — D51.0 PERNICIOUS ANEMIA: Primary | ICD-10-CM

## 2020-07-23 DIAGNOSIS — Z95.828 PORT-A-CATH IN PLACE: ICD-10-CM

## 2020-07-23 DIAGNOSIS — I82.722 CHRONIC DEEP VEIN THROMBOSIS (DVT) OF LEFT UPPER EXTREMITY, UNSPECIFIED VEIN (HCC): ICD-10-CM

## 2020-07-23 DIAGNOSIS — D51.0 PERNICIOUS ANEMIA: ICD-10-CM

## 2020-07-23 LAB
BASOPHILS # BLD AUTO: 0.02 10*3/MM3 (ref 0–0.2)
BASOPHILS NFR BLD AUTO: 1 % (ref 0–1.5)
DEPRECATED RDW RBC AUTO: 49.4 FL (ref 37–54)
EOSINOPHIL # BLD AUTO: 0.06 10*3/MM3 (ref 0–0.4)
EOSINOPHIL NFR BLD AUTO: 3.1 % (ref 0.3–6.2)
ERYTHROCYTE [DISTWIDTH] IN BLOOD BY AUTOMATED COUNT: 14.9 % (ref 12.3–15.4)
HCT VFR BLD AUTO: 29.5 % (ref 34–46.6)
HGB BLD-MCNC: 9.6 G/DL (ref 12–15.9)
INR PPP: 2.3
LYMPHOCYTES # BLD AUTO: 1.13 10*3/MM3 (ref 0.7–3.1)
LYMPHOCYTES NFR BLD AUTO: 58.2 % (ref 19.6–45.3)
MAGNESIUM SERPL-MCNC: 1.4 MG/DL (ref 1.6–2.4)
MCH RBC QN AUTO: 31.4 PG (ref 26.6–33)
MCHC RBC AUTO-ENTMCNC: 32.5 G/DL (ref 31.5–35.7)
MCV RBC AUTO: 96.4 FL (ref 79–97)
MONOCYTES # BLD AUTO: 0.16 10*3/MM3 (ref 0.1–0.9)
MONOCYTES NFR BLD AUTO: 8.2 % (ref 5–12)
NEUTROPHILS NFR BLD AUTO: 0.57 10*3/MM3 (ref 1.7–7)
NEUTROPHILS NFR BLD AUTO: 29.5 % (ref 42.7–76)
PLATELET # BLD AUTO: 34 10*3/MM3 (ref 140–450)
PMV BLD AUTO: 10.5 FL (ref 6–12)
RBC # BLD AUTO: 3.06 10*6/MM3 (ref 3.77–5.28)
WBC # BLD AUTO: 1.94 10*3/MM3 (ref 3.4–10.8)

## 2020-07-23 PROCEDURE — 25010000002 MAGNESIUM SULFATE 2 GM/50ML SOLUTION: Performed by: INTERNAL MEDICINE

## 2020-07-23 PROCEDURE — 25010000003 HEPARIN LOCK FLUSH PER 10 UNITS: Performed by: INTERNAL MEDICINE

## 2020-07-23 PROCEDURE — 99214 OFFICE O/P EST MOD 30 MIN: CPT | Performed by: INTERNAL MEDICINE

## 2020-07-23 PROCEDURE — 25010000002 CYANOCOBALAMIN PER 1000 MCG: Performed by: INTERNAL MEDICINE

## 2020-07-23 PROCEDURE — 85025 COMPLETE CBC W/AUTO DIFF WBC: CPT | Performed by: INTERNAL MEDICINE

## 2020-07-23 PROCEDURE — 85610 PROTHROMBIN TIME: CPT

## 2020-07-23 PROCEDURE — 96365 THER/PROPH/DIAG IV INF INIT: CPT

## 2020-07-23 PROCEDURE — 96360 HYDRATION IV INFUSION INIT: CPT

## 2020-07-23 PROCEDURE — 83735 ASSAY OF MAGNESIUM: CPT | Performed by: INTERNAL MEDICINE

## 2020-07-23 PROCEDURE — 96372 THER/PROPH/DIAG INJ SC/IM: CPT

## 2020-07-23 PROCEDURE — 36591 DRAW BLOOD OFF VENOUS DEVICE: CPT

## 2020-07-23 RX ORDER — HEPARIN SODIUM (PORCINE) LOCK FLUSH IV SOLN 100 UNIT/ML 100 UNIT/ML
500 SOLUTION INTRAVENOUS AS NEEDED
Status: CANCELLED | OUTPATIENT
Start: 2020-07-23

## 2020-07-23 RX ORDER — SODIUM CHLORIDE 0.9 % (FLUSH) 0.9 %
20 SYRINGE (ML) INJECTION AS NEEDED
Status: CANCELLED | OUTPATIENT
Start: 2020-07-23

## 2020-07-23 RX ORDER — CYANOCOBALAMIN 1000 UG/ML
1000 INJECTION, SOLUTION INTRAMUSCULAR; SUBCUTANEOUS ONCE
Status: COMPLETED | OUTPATIENT
Start: 2020-07-23 | End: 2020-07-23

## 2020-07-23 RX ORDER — MAGNESIUM SULFATE HEPTAHYDRATE 40 MG/ML
2 INJECTION, SOLUTION INTRAVENOUS ONCE
Status: COMPLETED | OUTPATIENT
Start: 2020-07-23 | End: 2020-07-23

## 2020-07-23 RX ORDER — SODIUM CHLORIDE 0.9 % (FLUSH) 0.9 %
20 SYRINGE (ML) INJECTION AS NEEDED
Status: DISCONTINUED | OUTPATIENT
Start: 2020-07-23 | End: 2020-07-24 | Stop reason: HOSPADM

## 2020-07-23 RX ORDER — HEPARIN SODIUM (PORCINE) LOCK FLUSH IV SOLN 100 UNIT/ML 100 UNIT/ML
500 SOLUTION INTRAVENOUS AS NEEDED
Status: DISCONTINUED | OUTPATIENT
Start: 2020-07-23 | End: 2020-07-24 | Stop reason: HOSPADM

## 2020-07-23 RX ORDER — SODIUM CHLORIDE 9 MG/ML
250 INJECTION, SOLUTION INTRAVENOUS ONCE
Status: COMPLETED | OUTPATIENT
Start: 2020-07-23 | End: 2020-07-23

## 2020-07-23 RX ADMIN — CYANOCOBALAMIN 1000 MCG: 1000 INJECTION INTRAMUSCULAR; SUBCUTANEOUS at 11:32

## 2020-07-23 RX ADMIN — HEPARIN 500 UNITS: 100 SYRINGE at 11:30

## 2020-07-23 RX ADMIN — MAGNESIUM SULFATE HEPTAHYDRATE 2 G: 40 INJECTION, SOLUTION INTRAVENOUS at 10:04

## 2020-07-23 RX ADMIN — Medication 20 ML: at 09:38

## 2020-07-23 RX ADMIN — SODIUM CHLORIDE 250 ML: 900 INJECTION, SOLUTION INTRAVENOUS at 10:05

## 2020-07-23 NOTE — PROGRESS NOTES
Patient here today 7/23/20 for Hycamtin, C1D15, her ANC 0.57 and Plts 34, spoke with Dr. Ball and her treatment was delayed for one week.

## 2020-07-30 ENCOUNTER — HOSPITAL ENCOUNTER (OUTPATIENT)
Dept: ONCOLOGY | Facility: HOSPITAL | Age: 65
Setting detail: INFUSION SERIES
Discharge: HOME | End: 2020-07-30

## 2020-07-30 ENCOUNTER — LAB (OUTPATIENT)
Dept: LAB | Facility: HOSPITAL | Age: 65
End: 2020-07-30

## 2020-07-30 VITALS
RESPIRATION RATE: 18 BRPM | SYSTOLIC BLOOD PRESSURE: 127 MMHG | HEIGHT: 65 IN | HEART RATE: 61 BPM | DIASTOLIC BLOOD PRESSURE: 70 MMHG | BODY MASS INDEX: 30.16 KG/M2 | TEMPERATURE: 97.8 F | WEIGHT: 181 LBS

## 2020-07-30 DIAGNOSIS — C56.1 MALIGNANT NEOPLASM OF RIGHT OVARY (HCC): Primary | ICD-10-CM

## 2020-07-30 DIAGNOSIS — I82.722 CHRONIC DEEP VEIN THROMBOSIS (DVT) OF LEFT UPPER EXTREMITY, UNSPECIFIED VEIN (HCC): ICD-10-CM

## 2020-07-30 DIAGNOSIS — T45.1X5A ANTINEOPLASTIC CHEMOTHERAPY INDUCED ANEMIA: ICD-10-CM

## 2020-07-30 DIAGNOSIS — D61.810 PANCYTOPENIA DUE TO ANTINEOPLASTIC CHEMOTHERAPY (HCC): ICD-10-CM

## 2020-07-30 DIAGNOSIS — Z79.01 LONG TERM (CURRENT) USE OF ANTICOAGULANTS: Primary | ICD-10-CM

## 2020-07-30 DIAGNOSIS — D64.81 ANTINEOPLASTIC CHEMOTHERAPY INDUCED ANEMIA: ICD-10-CM

## 2020-07-30 DIAGNOSIS — T45.1X5A PANCYTOPENIA DUE TO ANTINEOPLASTIC CHEMOTHERAPY (HCC): ICD-10-CM

## 2020-07-30 DIAGNOSIS — E83.42 HYPOMAGNESEMIA: ICD-10-CM

## 2020-07-30 DIAGNOSIS — Z95.828 PORT-A-CATH IN PLACE: ICD-10-CM

## 2020-07-30 LAB
BASOPHILS # BLD AUTO: 0.02 10*3/MM3 (ref 0–0.2)
BASOPHILS NFR BLD AUTO: 0.8 % (ref 0–1.5)
DEPRECATED RDW RBC AUTO: 53.5 FL (ref 37–54)
EOSINOPHIL # BLD AUTO: 0.08 10*3/MM3 (ref 0–0.4)
EOSINOPHIL NFR BLD AUTO: 3.2 % (ref 0.3–6.2)
ERYTHROCYTE [DISTWIDTH] IN BLOOD BY AUTOMATED COUNT: 16 % (ref 12.3–15.4)
HCT VFR BLD AUTO: 29.5 % (ref 34–46.6)
HGB BLD-MCNC: 9.6 G/DL (ref 12–15.9)
INR PPP: 3.7
LYMPHOCYTES # BLD AUTO: 1.26 10*3/MM3 (ref 0.7–3.1)
LYMPHOCYTES NFR BLD AUTO: 50.4 % (ref 19.6–45.3)
MAGNESIUM SERPL-MCNC: 1.6 MG/DL (ref 1.6–2.4)
MCH RBC QN AUTO: 31.6 PG (ref 26.6–33)
MCHC RBC AUTO-ENTMCNC: 32.5 G/DL (ref 31.5–35.7)
MCV RBC AUTO: 97 FL (ref 79–97)
MONOCYTES # BLD AUTO: 0.47 10*3/MM3 (ref 0.1–0.9)
MONOCYTES NFR BLD AUTO: 18.8 % (ref 5–12)
NEUTROPHILS NFR BLD AUTO: 0.67 10*3/MM3 (ref 1.7–7)
NEUTROPHILS NFR BLD AUTO: 26.8 % (ref 42.7–76)
PLATELET # BLD AUTO: 106 10*3/MM3 (ref 140–450)
PMV BLD AUTO: 11.2 FL (ref 6–12)
RBC # BLD AUTO: 3.04 10*6/MM3 (ref 3.77–5.28)
WBC # BLD AUTO: 2.5 10*3/MM3 (ref 3.4–10.8)

## 2020-07-30 PROCEDURE — 85610 PROTHROMBIN TIME: CPT

## 2020-07-30 PROCEDURE — 25010000002 EPOETIN ALFA-EPBX 40000 UNIT/ML SOLUTION: Performed by: INTERNAL MEDICINE

## 2020-07-30 PROCEDURE — 36591 DRAW BLOOD OFF VENOUS DEVICE: CPT

## 2020-07-30 PROCEDURE — 83735 ASSAY OF MAGNESIUM: CPT | Performed by: INTERNAL MEDICINE

## 2020-07-30 PROCEDURE — 96372 THER/PROPH/DIAG INJ SC/IM: CPT

## 2020-07-30 PROCEDURE — 25010000002 MAGNESIUM SULFATE 2 GM/50ML SOLUTION: Performed by: INTERNAL MEDICINE

## 2020-07-30 PROCEDURE — 25010000003 HEPARIN LOCK FLUSH PER 10 UNITS: Performed by: INTERNAL MEDICINE

## 2020-07-30 PROCEDURE — 96365 THER/PROPH/DIAG IV INF INIT: CPT

## 2020-07-30 PROCEDURE — 85025 COMPLETE CBC W/AUTO DIFF WBC: CPT | Performed by: INTERNAL MEDICINE

## 2020-07-30 RX ORDER — SODIUM CHLORIDE 0.9 % (FLUSH) 0.9 %
20 SYRINGE (ML) INJECTION AS NEEDED
Status: DISCONTINUED | OUTPATIENT
Start: 2020-07-30 | End: 2020-07-31 | Stop reason: HOSPADM

## 2020-07-30 RX ORDER — HEPARIN SODIUM (PORCINE) LOCK FLUSH IV SOLN 100 UNIT/ML 100 UNIT/ML
500 SOLUTION INTRAVENOUS AS NEEDED
Status: DISCONTINUED | OUTPATIENT
Start: 2020-07-30 | End: 2020-07-31 | Stop reason: HOSPADM

## 2020-07-30 RX ORDER — MAGNESIUM SULFATE HEPTAHYDRATE 40 MG/ML
2 INJECTION, SOLUTION INTRAVENOUS ONCE
Status: COMPLETED | OUTPATIENT
Start: 2020-07-30 | End: 2020-07-30

## 2020-07-30 RX ORDER — SODIUM CHLORIDE 9 MG/ML
250 INJECTION, SOLUTION INTRAVENOUS ONCE
Status: DISCONTINUED | OUTPATIENT
Start: 2020-07-30 | End: 2020-07-31 | Stop reason: HOSPADM

## 2020-07-30 RX ORDER — HEPARIN SODIUM (PORCINE) LOCK FLUSH IV SOLN 100 UNIT/ML 100 UNIT/ML
500 SOLUTION INTRAVENOUS AS NEEDED
Status: CANCELLED | OUTPATIENT
Start: 2020-07-30

## 2020-07-30 RX ORDER — SODIUM CHLORIDE 0.9 % (FLUSH) 0.9 %
20 SYRINGE (ML) INJECTION AS NEEDED
Status: CANCELLED | OUTPATIENT
Start: 2020-07-30

## 2020-07-30 RX ADMIN — MAGNESIUM SULFATE HEPTAHYDRATE 2 G: 40 INJECTION, SOLUTION INTRAVENOUS at 09:29

## 2020-07-30 RX ADMIN — EPOETIN ALFA-EPBX 40000 UNITS: 40000 INJECTION, SOLUTION INTRAVENOUS; SUBCUTANEOUS at 10:33

## 2020-07-30 RX ADMIN — HEPARIN 500 UNITS: 100 SYRINGE at 10:33

## 2020-07-30 RX ADMIN — Medication 10 ML: at 10:31

## 2020-07-30 NOTE — PROGRESS NOTES
Pt here for C1D15 Hycamtin.  Pt's ANC is 0.67.  Per Dr Ball, hold x 1 week.  Pt given Mag infusion and Retacrit today.  Pt rescheduled for next Thursday 8/6.  Pt v/u.

## 2020-08-01 ENCOUNTER — TRANSCRIBE ORDERS (OUTPATIENT)
Dept: ADMINISTRATIVE | Facility: HOSPITAL | Age: 65
End: 2020-08-01

## 2020-08-01 ENCOUNTER — LAB (OUTPATIENT)
Dept: LAB | Facility: HOSPITAL | Age: 65
End: 2020-08-01

## 2020-08-01 DIAGNOSIS — C80.1 CANCER (HCC): Primary | ICD-10-CM

## 2020-08-01 DIAGNOSIS — C80.1 CANCER (HCC): ICD-10-CM

## 2020-08-01 LAB
INR PPP: 2.9 (ref 2–3)
PROTHROMBIN TIME: 27.4 SECONDS (ref 19.4–28.5)

## 2020-08-01 PROCEDURE — 36415 COLL VENOUS BLD VENIPUNCTURE: CPT

## 2020-08-01 PROCEDURE — 85610 PROTHROMBIN TIME: CPT

## 2020-08-06 ENCOUNTER — HOSPITAL ENCOUNTER (OUTPATIENT)
Dept: ONCOLOGY | Facility: HOSPITAL | Age: 65
Setting detail: INFUSION SERIES
Discharge: HOME | End: 2020-08-06

## 2020-08-06 VITALS
WEIGHT: 178 LBS | HEART RATE: 60 BPM | BODY MASS INDEX: 29.66 KG/M2 | SYSTOLIC BLOOD PRESSURE: 112 MMHG | TEMPERATURE: 97.5 F | RESPIRATION RATE: 20 BRPM | DIASTOLIC BLOOD PRESSURE: 72 MMHG | HEIGHT: 65 IN

## 2020-08-06 DIAGNOSIS — C56.1 MALIGNANT NEOPLASM OF RIGHT OVARY (HCC): ICD-10-CM

## 2020-08-06 DIAGNOSIS — Z95.828 PORT-A-CATH IN PLACE: ICD-10-CM

## 2020-08-06 DIAGNOSIS — Z79.01 LONG TERM (CURRENT) USE OF ANTICOAGULANTS: Primary | ICD-10-CM

## 2020-08-06 DIAGNOSIS — E83.42 HYPOMAGNESEMIA: Primary | ICD-10-CM

## 2020-08-06 LAB
BASOPHILS # BLD AUTO: 0.02 10*3/MM3 (ref 0–0.2)
BASOPHILS NFR BLD AUTO: 0.6 % (ref 0–1.5)
DEPRECATED RDW RBC AUTO: 58.7 FL (ref 37–54)
EOSINOPHIL # BLD AUTO: 0.08 10*3/MM3 (ref 0–0.4)
EOSINOPHIL NFR BLD AUTO: 2.5 % (ref 0.3–6.2)
ERYTHROCYTE [DISTWIDTH] IN BLOOD BY AUTOMATED COUNT: 17.1 % (ref 12.3–15.4)
HCT VFR BLD AUTO: 32.6 % (ref 34–46.6)
HGB BLD-MCNC: 10.5 G/DL (ref 12–15.9)
LYMPHOCYTES # BLD AUTO: 1.16 10*3/MM3 (ref 0.7–3.1)
LYMPHOCYTES NFR BLD AUTO: 35.9 % (ref 19.6–45.3)
MAGNESIUM SERPL-MCNC: 1.6 MG/DL (ref 1.6–2.4)
MCH RBC QN AUTO: 31.5 PG (ref 26.6–33)
MCHC RBC AUTO-ENTMCNC: 32.2 G/DL (ref 31.5–35.7)
MCV RBC AUTO: 97.9 FL (ref 79–97)
MONOCYTES # BLD AUTO: 0.58 10*3/MM3 (ref 0.1–0.9)
MONOCYTES NFR BLD AUTO: 18 % (ref 5–12)
NEUTROPHILS NFR BLD AUTO: 1.39 10*3/MM3 (ref 1.7–7)
NEUTROPHILS NFR BLD AUTO: 43 % (ref 42.7–76)
PLATELET # BLD AUTO: 170 10*3/MM3 (ref 140–450)
PMV BLD AUTO: 11 FL (ref 6–12)
RBC # BLD AUTO: 3.33 10*6/MM3 (ref 3.77–5.28)
WBC # BLD AUTO: 3.23 10*3/MM3 (ref 3.4–10.8)

## 2020-08-06 PROCEDURE — 25010000002 PEGFILGRASTIM 6 MG/0.6ML PREFILLED SYRINGE KIT: Performed by: INTERNAL MEDICINE

## 2020-08-06 PROCEDURE — 25010000002 TOPOTECAN 4 MG/4ML SOLUTION 4 ML VIAL: Performed by: INTERNAL MEDICINE

## 2020-08-06 PROCEDURE — 96413 CHEMO IV INFUSION 1 HR: CPT

## 2020-08-06 PROCEDURE — 25010000002 MAGNESIUM SULFATE 2 GM/50ML SOLUTION: Performed by: INTERNAL MEDICINE

## 2020-08-06 PROCEDURE — 96377 APPLICATON ON-BODY INJECTOR: CPT

## 2020-08-06 PROCEDURE — 25010000003 HEPARIN LOCK FLUSH PER 10 UNITS: Performed by: INTERNAL MEDICINE

## 2020-08-06 PROCEDURE — 96367 TX/PROPH/DG ADDL SEQ IV INF: CPT

## 2020-08-06 PROCEDURE — 36591 DRAW BLOOD OFF VENOUS DEVICE: CPT

## 2020-08-06 PROCEDURE — 83735 ASSAY OF MAGNESIUM: CPT | Performed by: INTERNAL MEDICINE

## 2020-08-06 PROCEDURE — 85025 COMPLETE CBC W/AUTO DIFF WBC: CPT | Performed by: INTERNAL MEDICINE

## 2020-08-06 PROCEDURE — 25010000002 DEXAMETHASONE SODIUM PHOSPHATE 120 MG/30ML SOLUTION: Performed by: INTERNAL MEDICINE

## 2020-08-06 PROCEDURE — 96375 TX/PRO/DX INJ NEW DRUG ADDON: CPT

## 2020-08-06 RX ORDER — HEPARIN SODIUM (PORCINE) LOCK FLUSH IV SOLN 100 UNIT/ML 100 UNIT/ML
500 SOLUTION INTRAVENOUS AS NEEDED
Status: CANCELLED | OUTPATIENT
Start: 2020-08-06

## 2020-08-06 RX ORDER — HEPARIN SODIUM (PORCINE) LOCK FLUSH IV SOLN 100 UNIT/ML 100 UNIT/ML
500 SOLUTION INTRAVENOUS AS NEEDED
Status: DISCONTINUED | OUTPATIENT
Start: 2020-08-06 | End: 2020-08-07 | Stop reason: HOSPADM

## 2020-08-06 RX ORDER — SODIUM CHLORIDE 0.9 % (FLUSH) 0.9 %
20 SYRINGE (ML) INJECTION AS NEEDED
Status: CANCELLED | OUTPATIENT
Start: 2020-08-06

## 2020-08-06 RX ORDER — MAGNESIUM SULFATE HEPTAHYDRATE 40 MG/ML
2 INJECTION, SOLUTION INTRAVENOUS ONCE
Status: COMPLETED | OUTPATIENT
Start: 2020-08-06 | End: 2020-08-06

## 2020-08-06 RX ORDER — SODIUM CHLORIDE 0.9 % (FLUSH) 0.9 %
20 SYRINGE (ML) INJECTION AS NEEDED
Status: DISCONTINUED | OUTPATIENT
Start: 2020-08-06 | End: 2020-08-07 | Stop reason: HOSPADM

## 2020-08-06 RX ORDER — SODIUM CHLORIDE 9 MG/ML
250 INJECTION, SOLUTION INTRAVENOUS ONCE
Status: DISCONTINUED | OUTPATIENT
Start: 2020-08-06 | End: 2020-08-07 | Stop reason: HOSPADM

## 2020-08-06 RX ORDER — SODIUM CHLORIDE 9 MG/ML
250 INJECTION, SOLUTION INTRAVENOUS ONCE
Status: COMPLETED | OUTPATIENT
Start: 2020-08-06 | End: 2020-08-06

## 2020-08-06 RX ORDER — WARFARIN SODIUM 5 MG/1
TABLET ORAL
Qty: 30 TABLET | Refills: 0 | Status: SHIPPED | OUTPATIENT
Start: 2020-08-06 | End: 2020-09-04 | Stop reason: SDUPTHER

## 2020-08-06 RX ADMIN — MAGNESIUM SULFATE HEPTAHYDRATE 2 G: 40 INJECTION, SOLUTION INTRAVENOUS at 08:21

## 2020-08-06 RX ADMIN — DEXAMETHASONE SODIUM PHOSPHATE 12 MG: 4 INJECTION, SOLUTION INTRA-ARTICULAR; INTRALESIONAL; INTRAMUSCULAR; INTRAVENOUS; SOFT TISSUE at 09:10

## 2020-08-06 RX ADMIN — PEGFILGRASTIM 6 MG: KIT SUBCUTANEOUS at 09:56

## 2020-08-06 RX ADMIN — HEPARIN 500 UNITS: 100 SYRINGE at 09:57

## 2020-08-06 RX ADMIN — SODIUM CHLORIDE 250 ML: 900 INJECTION, SOLUTION INTRAVENOUS at 08:21

## 2020-08-06 RX ADMIN — SODIUM CHLORIDE 6.1 MG: 900 INJECTION, SOLUTION INTRAVENOUS at 09:22

## 2020-08-06 RX ADMIN — Medication 20 ML: at 09:57

## 2020-08-11 ENCOUNTER — TELEPHONE (OUTPATIENT)
Dept: ONCOLOGY | Facility: CLINIC | Age: 65
End: 2020-08-11

## 2020-08-11 NOTE — TELEPHONE ENCOUNTER
I called and left a message requesting that the patient call the office to discuss the change in her treatment plan.      I spoke with Dr. Ball and she would like for the patient's care plan to continue with weekly x 3 weeks and off one week.  She is wanting the patient to receive Neupogen daily (off on weekends) starting on day 9 until her next cycle on day 15 due to neutropenia after cycle 2 with her first cycle.

## 2020-08-11 NOTE — PROGRESS NOTES
Hematology/Oncology Outpatient Follow Up    PATIENT NAME:Tahira Zimmerman  :1955  MRN: [id number]  PRIMARY CARE PHYSICIAN: Noemy Queen MD  REFERRING PHYSICIAN: Noemy Queen MD    Chief Complaint   Patient presents with   • Follow-up   • Chemotherapy     recurrent ovarian cancer          HISTORY OF PRESENT ILLNESS:   1. Recurrent ovarian cancer diagnosis established in 2013.  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  The patient was treated with surgery and then postoperatively with adjuvant chemotherapy consisting of Carboplatin and Taxol.  Six months later, she had recurrence of disease intra-abdominally between the rectum and vagina, which was treated with intraabdominal peritoneal chemotherapy.  She had been free of disease since that time.  She reported feeling a mass in the left side of her abdomen approximately six months ago.  This gradually grew and in early 2013 she had her daughter feel the mass.  The daughter works for Dr. David Rogers and the patient at that time also complained of intermittent rectal bleeding.  Consultation with Dr. David Rogers was obtained.  The patient had a CT scan of the abdomen and pelvis performed on 13 revealing left lower quadrant mass measuring 9.7 x 9.3 x 6 cm demonstrating areas of dense calcification, soft tissue density and cystic density within it.  Stromal tumor is felt to be most likely a possibly fibroma.  It is generated with necrosis and calcification.  Possible palpable mass in the rectus muscle is seen with calcification and deformity of the rectus muscle and just below this a second mass intra-abdominally was seen measuring 2 cm in the greatest diameter suspicious for second intraabdominal tumor.  The mass separate from the largest mass measuring 2.6 cm in the dependent portion of pelvis is seen on the right of the midline.   No retroperitoneal lymphadenopathy was seen.  No free air, free fluid or other abnormality was present.  The patient was scheduled for an outpatient colonoscopy, but had syncopal episode while sitting in the toilet the night before and was brought to the emergency room early in the morning by her daughter and son-in-law.  The patient had apparently stopped breathing for a few seconds and did not have a pulse, but started breathing before CPR could be initiated.  In the emergency room, the patient had an EKG revealing sinus rhythm nonspecific T-wave flattening; metabolic panel revealed a glucose of 148, creatinine of 1.3, CBC was normal.  She was treated with intravenous fluid.  The patient’s case was then discussed with Dr. Anabell Monique and patient was subsequently admitted to [facility].  The patient underwent a colonoscopy by Dr. David Rogers on 06/27/13 revealing sigmoid diverticulosis and grade II internal and external hemorrhoids, but no mass or colitis was seen.  CT-guided biopsy of the mass was performed on 06/27/13 as well revealing metastatic papillary adenocarcinoma with numerous psammoma bodies.  Pathology comment stated prior records were not available; however, with history of ovarian cancer the current biopsy would be consistent with metastases from that malignancy.  Oncology consult was obtained and I initially saw the patient on 06/27/2013 where the patient had claimed to have little bit of pain in the area of disease.  She reported being frequently constipated, denies any weight loss or fevers.  She did report having some night sweats, but thought that was because of her menopause.  On 06/27/13 metastatic workup including labs and CT scans were initiated.  CT scan of the chest on 06/27/13 revealed no acute disease in the chest.  A 1.4 cm size focal area of decreased density was noted in the lateral aspect of the right lobe of the liver consistent with a benign cyst or hemangioma.   There was a very small hiatal hernia measuring 2.2 cm in diameter.  A CT scan of the head performed 06/27/13 revealed no acute intracranial abnormality noted.  CA-125 was 40 units/ml (0-35), CA 19-9 was 11.8 units/ml (0-35), CEA level was 0.8 ng/ml (0-3), TSH level was 1.09 IU/ml (0.34-5.6).  The patient was in workup for the syncopal episode, a carotid Doppler was performed on June 28 revealing an essentially normal study showing a reduction in diameter from 0-15% bilaterally.  A Holter monitor was completed on 06/27/13, which was unremarkable except for a few premature atrial contractions.  The patient was felt stable and subsequently was discharged home on 06/28/13.  Post discharge, the patient was seen at [facility] on 07/05/13 by Dr. Kathryn Thrasher who discussed treatment options with the patient including surgery.  The patient is in the office today 07/16/13 in follow up post hospitalization.  She is reporting that surgery is planned for July 30th of this month at .  · 7/30/13 to 8/3/13 - The patient was in [facility].  The patient was admitted for surgery for her recurrent ovarian cancer.  The patient had exploratory laparotomy, omentectomy, bowel resection, liver biopsy and appendectomy.  Pathology revealed of the omentum metastatic serous carcinoma of the transverse colon, segmental resection showed metastatic serous carcinoma involving mesenteric fat.  The liver wedge resection showed fibrosclerotic thickening of the hepatic capsule.  Benign liver parenchyma.  No evidence of metastatic tumor.  Appendix showed fibrous obliteration of the lumen.  Negative for metastatic carcinoma.  Rectum and sigmoid colon segmental resection showed metastatic serous carcinoma invading the colorectal mucosa uninvolved by tumor.  Vaginal mass showed metastatic serous carcinoma.  Margins are positive for tumor.  · 9/24/13 - Chemotherapy orders were written for patient to start  carboplatin 550 mg IV day one and Taxol 330 mg IV day one to be cycled every three weeks.  · 10/10/13 - The patient started cycle #1 of chemotherapy consisting of Carboplatin and Taxol.  · 09/25/13 -  is 10.6 normal.  · 10/01/13 - CT of the chest, abdomen and pelvis revealed new reticular nodular occupancy in the anterior segment of the right upper lobe, could reflect an inflammation or infectious etiology or could reflect unusual persistence of metastatic tumor.  New liver lesions, the smaller one appears to be implant on the surface of the liver, the larger may also represent an implant including the intrahepatic.  Several small mesenteric nodes seen throughout the abdomen and pelvis.  · 10/02/13 - Port placed by Dr. Sen.  · 10/24/13 - Orders written to start Neupogen daily and if more than three Neupogen are needed to give Neulasta after next chemo.  ANC is 0.15.  · 10/31/14 - Patient given cycle 2 of chemotherapy with Taxol and Carboplatin.  · 11/21/13 - The patient started cycle 3 of chemotherapy consisting of Carboplatin and Taxol.  · 11/26/13 - CT scan of chest, abdomen and pelvis revealed no evidence of active disease in the chest.  Reticulonodular opacity has resolved.  Metastatic lesion impressing upon the hepatic dome similar to prior exam.  No evidence of a change.  No evidence of new disease.  Postsurgical changes in the pelvis and throughout the retroperitoneum in the large bowel.  Finding containing anterior abdominal wall hernia containing fat tissue and enhancing vessels, 3 cm in diameter.  · 12/2/13 - Orders written to start Neupogen per protocol as well as Aranesp due to low hemoglobin and ANC.  ANC is 0.14.  Hemoglobin is 9.7.  · 12/11/13 - Orders written to hold chemotherapy until next week and decrease dose by 20% due to low platelet count.  Platelet count is 85,000.  · 12/16/13 - The patient received cycle 4 of Carboplatin and Taxol at a 20% dose reduction so Taxol dose is 260 mg and  Carboplatin is 440 mg.  · 12/16/13 - CA-125 12.6 (N).  · 12/19/13 - PET/CT scan.  Impression:  1. There is no evidence of hypermetabolism in the liver.  Specifically of the dominant lesion in or adjacent to the right hepatic dome that was seen and described on the recent CT study of 11/26/2013.  It is photopenic relative to the surrounding liver.  2.  There is no evidence of hypermetabolic soft issue mass lesion or free fluid in the abdomen or pelvis.  3.  The tonsillar tissues are slightly enlarged and have moderate activity level.  This maybe physiologic.  Correlation with oral cavity, examination is recommended.  4.  Mild amount of activity in the right level II jugular lymph chain without focally enlarged lymph nodes is of questionable significance.  · 1/6/13 - The patient received cycle 5 of chemotherapy with Taxol and Carboplatin.  · 1/27/14 - The patient started on cycle 6 of Taxol and Carboplatin.  · 2/18/14 - CT of the abdomen and pelvis with contrast; stable lesions impressing upon the hepatic dome, unchanged compared to the prior exam.  Multiple anterior abdominal wall hernias re-demonstrated.  Those above the umbilicus contain omental fat.  Below and to the left of the umbilicus as anterior abdominal hernia containing omental fat in nondilated small bowel which is larger than seen previously.  · 2/20/14 - The patient received cycle 7 of chemotherapy with Taxol and Carboplatin.   · 3/11/14 - Order written to discontinue chemotherapy due to patient has completed 7 cycles of chemotherapy and CT scans suggest possible remission.  · 3/11/14 -  11.0 (N).  · 06/05/14 - CT scan of the chest abdomen and pelvis with contrast: There are multiple anterior abdominal wall hernias.  There are 2 larger anterior abdominal wall hernias at the level of the transverse colon, which contain omental fat.  There are at least 2 small anterior abdominal wall hernias that contain omental fat.  There is a moderate sized  anterior abdominal wall hernia at the level of the umbilicus, eccentric to the left, which contain non-dilated bowel.  Interval decrease in the size of two deposit impressing on the liver.  The third tiny deposit has been stable.  · 8/19/14 -  10.4 (N).  · 12/19/14 -   10.0 (N)  · 1/7/15 - CT chest, abdomen and pelvis with stable appearance of the chest with several micronodules. Small hiatal hernia, slightly larger than previous exam, increased from 1.5 to 2.5 cm in diameter. Bochdalek hernia unchanged. Multiple hepatic lesions. The two dominant lesions at the right hepatic dome had decreased in size from previous. The remaining tiny nodules measured 3-5 mm in diameter and were unchanged. There was no evidence of new intra-abdominal or pelvic mass or free fluid. There were multiple anterior abdominal wall hernias which had increased in size.   · 7/27/15 - Comprehensive metabolic panel with no significant abnormalities. CA-125 of 21 (0-35).   · 10/26/15 - WBC 6, hemoglobin 13, platelet count 204,000. Heart rate 47. CA-125 of 20 (0-35).    · 2/18/16 - CT chest with contrast with stable micronodular density seen in the lungs without significant change from prior exam. CT abdomen and pelvis with regression of liver lesions with no new evidence of metastasis. Multiple ventral hernias again noted without significant change from prior exam. Creatinine 0.9 (0.6-1.3).    · 2/25/16 - Patient hospitalized at [facility] between 2/25/16 and 2/27/16 with pyelonephritis secondary to E-coli. She was given two days of IV Rocephin and then placed on oral Levaquin. She was asked to hold her Coumadin, but then had nausea and vomiting because of Levaquin and stopped the Levaquin also. Her CA-125 was 32 (0-35) and CEA 1.3 (0-5). Her urine grew >100,000 E-coli. CT of the abdomen and pelvis was done which revealed 4.1 x 1.8 cm sized mild ovoid area of decreased density in the liver parenchyma along the anteromedial  aspect of the dome of the right lobe of the liver, unchanged significantly since the previous study of 2/18/16. There was suspicion of a very small pericardial effusion. There was suspicion of a small hiatal hernia. There were several hernias in the anterior abdominal wall. A 3.5 x 3.1 cm incised well-circumscribed abnormal density in the left retroperitoneal fatty soft tissue at the level of the left iliac crest was suggestive of tumor recurrence. There was an abnormal density in the left retroperitoneal soft tissues in the left flank that partially surrounded the left kidney and extended downward to the left pelvic area. Diverticulosis of the distal descending colon and sigmoid colon were seen.     · 3/8/16 - Patient prescribed Bactrim DS 1 p.o. b.i.d. for 10 days for pyelonephritis, which was partially treated with Rocephin and Levaquin. Urine with 40,000 E-coli treated with Macrobid for 7 days.  of 20 (0-35).    · 3/31/16 - PET scan with area of low density anteriorly in the right lobe of the liver with no significant radiopharmaceutical uptake to suggest malignancy. Multiple round soft tissue density masses showing increased radiopharmaceutical activity and multiple anterior abdominal wall hernias.   · 5/10/16 - Patient complains of venous enlargement of the left chest wall around the port. Has not been seen by  yet, but has an appointment on 5/23/16. Complained of a cough.   · 5/12/16 - Infusaport contrast study with no evidence of fibrin sheath. Chest x-ray with mild cardiac prominence.   · 5/23/16 - Patient seen in consultation by Sheng Bowman M.D. at [facility] and a chemotherapy trial recommended.   · 6/1/16 -   of 27.1 (0-35).    · 6/14/16 - WBC 5.71, hemoglobin 12.4, platelet count 188,000. Patient is scheduled for surgery at  on 6/16/16 after further discussion with  physicians. Discussed adjuvant chemotherapy.   · 6/16/16 - Excisional biopsy of abdominal wall mass  performed by Sheng Bowman M.D. at [facility] with pathology revealing metastatic high-grade papillary serous carcinoma.   · 7/7/16 - Received a call from the patient that Tramadol was not working and that she was given Norco #24 after her biopsy at  which did help her pain. Patient prescribed Norco 5/325 one p.o. q. 4-6 hours p.r.n. #60 with no refills. Echocardiogram with left ventricular inferior wall hypokinesis with ejection fraction of 50-55%.   · 7/8/16 - Patient seen in consultation by Sheng Bowman M.D. in consultation at  for metastatic high-grade papillary serous carcinoma diagnosed by excisional biopsy on 6/16/16 with carcinomatosis and patient not a surgical resection candidate. Genetic sequencing and susceptibility testing of tumor was ordered. Biopsy was done of anterior abdominal wall.   · 7/7/16 - Echocardiogram with left atrial enlargement. Mild mitral regurgitation. Left ventricular proximal inferior wall hypokinesis with ejection fraction of 50-55%.   · 7/11/16 - While waiting for genomics results, in order not to delay treatment further, order written for Taxotere 60 mg/M2 IV over one hour followed by Carboplatin AUC 5 over one hour, cycles to be repeated every three weeks.  Comprehensive metabolic panel normal.  26 (0-35).    · 725/16 - Pain contract signed for use of Norco.   · 8/5/16 - Patient stated that she experienced a red rash and was itchy post taking Norco. Norco discontinued and Tramadol refilled.   · 8/16/16 - Patient started cycle 1 chemotherapy. WBC 7.1, hemoglobin 11.2, MCV 88.7, platelet count 94,000. Patient complained of nausea for a week post chemo. Already getting Emend, Aloxi and Decadron. Added Omeprazole 40 mg daily orally.   · 8/17/16 - Urine culture with >100,000 E-coli.   · 8/25/16 - Cycle 2 chemotherapy given.   · 9/9/16 - Magnesium 1.3, replaced intravenously. Potassium 3.4 (3.6-5.1), increased oral potassium supplementation.    · 9/16/16 -  Cycle 3 chemotherapy given.   · 9/19/16 - Comprehensive metabolic panel with no significant abnormality.   · 9/20/16 - CT abdomen and pelvis with evidence of progressive metastatic disease in the abdomen and pelvis with interval enlargement of several soft tissue attenuations and subcutaneous nodules in the anterior abdominal wall. Interval enlargement of a left pelvic soft tissue attenuation mass. A lentiform area of low attenuation in the dome of the liver stable in size. Multiple anterior abdominal wall hernias containing fat and/or bowel. Marked pelvic floor laxity. CT chest negative for evidence of metastatic disease. Tiny pulmonary nodules in the right lung stable since 2013 consistent with a benign etiology.   · 9/23/16 - Macrobid 100 mg p.o. b.i.d. x7 days prescribed for UTI. Current chemotherapy discontinued after discussion and new chemotherapy orders written. Carboplatin AUC-5 IV day 1 and Doxil (Liposomal Doxorubicin) 30 mg/M2 IV day 1 to cycle q. 21 days.  of 18 (0-35). Magnesium 1.6, replaced intravenously. Comprehensive metabolic panel with potassium 3.4 (3.6-5.1).     · 10/13/16 - Patient started on cycle 1 of Carboplatin and Liposomal Doxorubicin followed by Neulasta.   · 11/3/16 - Cycle 2 Carbo and Doxil given.   · 11/17/16 - Magnesium 1.0, replaced intravenously. Oral potassium supplement increased.   · 11/29/16 - CT chest with small non-calcified right pulmonary nodules unchanged. Benign bone island in the midthoracic vertebral body along with benign bony hemangiomas in the middle thoracic vertebral bodies. CT abdomen and pelvis with mild progressive metastatic disease from CT of September 2016. Anterior abdominal wall mass superiorly mildly increased in size with new area noted as described measuring 3 x 1.7 cm. 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 Stable area of decreased uptake in the dome of the liver not hypermetabolic on recent PET  scan, likely representing cyst or focal fatty infiltration. Stable small hiatal hernia, fat-containing Bochdalek’s hernia and anterior abdominal wall hernias with stable pelvic floor relaxation.    · 12/1/16 - Cycle 3 chemotherapy given.   · 12/8/16 - Current chemotherapy discontinued and patient started on Gemcitabine 1000 mg/M2 IV days 1, 8, 15 q. 28 days.   · 12/22/16 - Patient seen in followup by Juliana Roy M.D. at the pain clinic, treated with Oxycodone and Lyrica. Transaminases normal. Patient started cycle 1 chemotherapy.   · 12/29/16 - Magnesium 1.1 (1.8-2.5), replaced intravenously.   · 1/5/17 - Cycle 1 day 15 chemotherapy held as patient leaving for vacation to [place]. Chemotherapy dose decreased by 20%. Patient complains of diarrhea for which Imodium prescribed.   · 1/11/17 - BRCA-1 and BRCA-2 negative.   · 1/12/17 - Echocardiogram with ejection fraction 60% or greater.   · 1/19/17 - Comprehensive metabolic panel with no significant abnormality. Patient started cycle 2 chemotherapy.   · 2/2/17 - Urine with >100,000 E-coli treated with Cipro 500 mg p.o. b.i.d. x1 week.   · 2/6/17 - Magnesium 1.1 (1.8-2.5), replaced intravenously.    · 2/23/17 - Patient started cycle 3 chemotherapy.   · 2/27/17 - CT chest with interval development of too numerous to count very small pulmonary nodules, ill-defined ground glass opacities concerning for development of pulmonary metastatic disease. Stable left-sided chest port. CT abdomen and pelvis with stable appearance of multiple small soft tissue densities within the abdomen near midline subcutaneous fat of the abdominal wall. Interval decrease in size of largely calcified left pelvic mass and multiple fat in bowel containing small ventral hernias.   · 3/6/17 - Pulmonary consultation obtained for lung nodules. Discussed CT results with patient. Decision made to continue present chemotherapy until further lung evaluation as abdominal disease better.  of 18  (0-35).    · 3/16/17 - Patient started cycle 4 chemotherapy, but treatment held from day 8 onwards because of hospitalization.   · 3/19/17 - [facility]17 with chest pain. Chest x-ray had revealed increased mild bibasilar airspace disease. Troponin I and EKG’s were negative. INR was elevated. Patient was treated with antibiotics. EGD was performed revealing small hiatal hernia and esophageal dilatation was performed. Bronchoscopy was performed also with no intrabronchial lesions identified. IgA was 51 (), IgG 320 (600-1500) and IgM 19 (). Lexiscan nuclear stress test had no evidence of reversible myocardial ischemia with normal left ventricular ejection fraction of 58%. CT chest had development of patchy bilateral ground glass opacities most pronounced involving the left upper lobe and bilateral lower lobes. Multiple tiny bilateral pulmonary nodules were less conspicuous. The patient underwent a bronchoscopy by Jerica Esparza M.D. with right upper lobe bronchoalveolar lavage revealing benign squamous cells and bronchial cells with pulmonary macrophages present. There was mild acute inflammation. Negative for malignant cells. Prilosec was changed to Famotidine for hypomagnesemia.    · 4/6/17 - Magnesium 1.2 (1.8-2.5). Comprehensive metabolic panel with no significant abnormality. Patient seen in follow-up [facility]. Treated with Lyrica and Oxycodone.    · 5/4/17 - Patient complains of a rash itching on her right upper arm. Eczematous rash noted and patient prescribed Cyclocort 0.1% b.i.d. Magnesium infusions continued with each chemo. Order written to resume chemotherapy.   · 5/16/17 - Cycle 5 chemotherapy started.   · 5/26/17 - Magnesium 1.4 (1.8-2.5), replaced intravenously. Potassium 2.9 (3.6-5.1), KCL increased to 20 mEq three in the morning and three in the evenings.   · 5/30/17 - PET scan with remaining metabolic  activity within the nodular areas. The suggested mass which is partially calcified and necrotic within the left aspect of the pelvis seems slightly larger with increased metabolic activity. There was more calcification in these areas compared to prior study, suggesting inflammation.      · 6/8/17 - Patient complaining of shortness of breath. Does take HCTZ at home. Chest x-ray ordered and chemotherapy continued along with weekly magnesium replacement. Chest x-ray with no acute cardiopulmonary abnormalities.   · 6/13/17 - Patient received cycle 6 of chemotherapy.   · 7/31/17 - WBC 5.0, hemoglobin 11.2, MCV 89.7, platelet count 217,000. Patient is to resume chemotherapy starting with cycle 7 on Wednesday of this week.  of 19 (<35). Potassium 3.3 (3.5-5.3).     · 8/2/17 - Patient began cycle 7 of chemotherapy.   · 8/30/17 - Patient started cycle 8 chemotherapy.   · 9/5/17 - Patient seen in followup at Pain Management by Fito Ram M.D. and continued on treatment with Oxycodone and Lyrica.   · 9/13/17 - Patient was hospitalized at [facility] for a day with dizziness secondary to dehydration, hypokalemia and anemia. She was given 1 unit of packed red blood cells and electrolytes were replaced. Chest x-ray revealed a subtle opacity in the left lateral lung base favored to represent atelectasis. Magnesium 1.3 (1.5-2.5), patient continued on replacement.    · 9/22/17 - Chemotherapy and magnesium replacement continued with decrease in chemotherapy dose by 10% secondary to cytopenias.   · 9/25/17 - Cycle 9 chemotherapy started.   · 10/12/17 - CT of the chest with contrast showed several tiny pulmonary nodules. One within the right lower lobe appears new from prior exams. Two adjacent foci of nodularity within the medial right lower lobe suggestive of minor infectious or inflammatory process. CT of the abdomen and pelvis with contrast which showed soft tissue nodules along the anterior abdominal and pelvic walls  unchanged from previous scan. Also a partially calcified mass within the left pelvis unchanged. No acute process identified within the abdomen or pelvis. Several left paracentral ventral hernias unchanged. No evidence of acute bowel obstruction.   · 10/16/17 - Order written for Levaquin 500 mg p.o. daily as the patient states that she has had a productive cough, fever and chills and with the results of the CT scan showing a possible infectious versus inflammatory process. Order also written to continue chemotherapy and magnesium replacement as previously prescribed.   · 10/23/17 - Cycle 10 chemotherapy started.   · 11/19/17 - Patient went to the Emergency Room at [facility] complaining of right lower extremity redness and fever for a day. Venous Doppler had no evidence of a DVT and patient was discharged home on Clindamycin.   · 11/21/17 -  of 17 (0-35).   · 12/4/17 - Cycle 11 chemotherapy started.   · 12/20/17 - PET scan with multiple hypermetabolic soft tissue nodules abutting the anterior abdominal wall, stable from previous examination. Peripherally calcified left lower quadrant mass near the ascending colon stable in size demonstrating no hypermetabolic uptake. Previously had maximum SUV of 7. Right medial basilar pulmonary nodules resolved.   · 12/22/17 - CT left lower extremity with soft tissue swelling with skin thickening present along the anterior and medial aspect of the leg, slightly more pronounced around the palpable marker seen within the upper medial aspect of the lower extremity.   · 12/26/17 - Elastic stockings prescribed for lower extremity edema.   · 1/8/18 - Patient hospitalized at [facility] between 1/8/18 and 1/11/18 with fever of up to 101 degrees and painful rash on the lower extremities of several weeks’ duration. She was found to be hypokalemic and MRI of the lower extremities revealed thickening and swelling. Chest x-ray had no acute cardiopulmonary abnormality. Skin biopsy was performed by  Surgery revealing stasis dermatitis and no evidence of malignancy. Infectious Disease consultation was obtained and IV antibiotics were stopped. Blood cultures had no growth.   · 1/22/18 - Patient asked to start wearing elastic stockings which she has not started yet. She was given a prescription for Dyazide 1 p.o. daily.   · 2/26/18 - Ordered chemo to resume again. Patient unaware that she was supposed to resume chemo after her last visit in January. Continue magnesium infusions on day 1, 8 and 15. Prescribed Levaquin 500 mg p.o. daily x10 days for productive cough x1 week. History of viral illness consistent with influenza.  of 18 (0-35). Chest x-ray with no acute process.    · 3/5/18 - Cycle 12 chemotherapy started.   · 3/26/18 - Options discussed with patient. Decision made to discontinue IV Gemzar and orders written to start on Niraparib (Zejula) 300 mg p.o. daily as maintenance therapy.   · 4/11/18 - Niraparib discontinued due to insurance denial. Orders written to start Carboplatin-AUC 5 IV day 1 cycling every 21 days. Orders written for Neulasta 6 mg subcutaneous kit day 1 with palliative treatment intent and expected duration of treatment three months.   · 4/12/18 - CT chest, abdomen and pelvis with contrast revealed numerous tiny centrilobular nodules in the lungs favored to reflect infectious or inflammatory process. Nodules ranged 1-3 mm in size and are new from prior studies with metastatic disease not entirely excluded. Stable small hiatal hernia. Interval progression of metastatic disease involving the subcutaneous tissues at the ventral abdominal wall. Multiple soft tissue density nodules previously shown to be hypermetabolic on PET scan. New small crescent of low attenuation material along the periphery of the spleen with similar finding at the dome of the liver. Findings are concerning for peritoneal metastases. Density of the dome of the liver remained unchanged from multiple prior studies.  Stable calcified mass in the left pelvic sidewall. Urinary bladder wall thickening.   · 4/23/18 - Cycle 1 chemotherapy with Carboplatin administered along with Neulasta injection.   · 4/26/18 - Neulasta discontinued due to insurance denial.   · 5/14/18 - Patient received cycle 2 Carboplatin.   · 6/5/18 - Cycle 3 day 1 chemotherapy with Carboplatin administered.   · 6/20/18 - CT chest, abdomen and pelvis at [facility] showed re-demonstration of soft tissue mass in the ventral wall hernia left of the midline as well as the dome of the liver, likely representing metastatic disease similar as compared to the prior study. Additional calcified lesions present in the upper left hemipelvis and along the anterior abdominal wall to the right of the midline also likely representing metastatic disease. No definite new lesions were identified. Moderate to large stool burden likely related to mild constipation was present. No suspicious lung nodules were identified. The previously-described ground glass nodules appeared to have resolved. There was a stable subpleural nodule in the right upper lobe measuring 3 mm present since 2014 without significant changes.   · 6/26/18 - Cycle 4 day 1 Carboplatin administered with addition of Neulasta for febrile neutropenia prophylaxis.   · 6/29/18 - Reviewed CT scans with patient. Orders written to discontinue Carboplatin and Neulasta and plan to start Niraparib 300 mg by mouth daily as maintenance therapy. Prescribed Amlodipine 2.5 mg p.o. daily for chemo-induced hypertension.   · 7/18/18 - Orders written to increase weekly Magnesium Sulfate to 3 g IV weekly due to continued hypomagnesemia.   · 8/1/18 - Patient reports she has not received Niraparib (Zejula) to date.   · 8/30/18 - Patient’s phone was not working so though Niraparib approved, the drug company had not been able to get ahold of her. Patient supplied with drug company number so that she can start taking the pills.    · 9/6/18 -  of 20 (N).   · 9/18/18 - Urinalysis done for dysuria and back pain. Urine culture grew 20,000 to 50,000 colonies of urogenital ruy. Prescribed Bactrim DS one tablet twice daily for one week.   · September 2018 - Patient initiated on Zejula 300 mg p.o. daily.   · 10/1/18 - WBC 3.5, hemoglobin 12, platelet count 74,000. Niraparib (Zejula) dose held and then resumed when platelets above 100,000 at a dose of 200 mg daily.   · 10/15/18 - Patient received Niraparib (Zejula) at 200 mg p.o. daily with a platelet count of 198,000.   · 11/7/18 - WBC 6, hemoglobin 11.6, MCV 96.2, platelet count 137,000. Patient claims to have stopped taking Niraparib a few days prior because of cough. Asked to resume taking it. Ciprofloxacin 500 mg p.o. twice daily for one week for bronchitis.   · 12/3/18 - Magnesium Sulfate infusions changed to every two weeks, to send mag level before and give 3 grams. DC’d Magnesium Oxide and started Magnesium Plus Protein two pills twice daily.   · 1/4/19 - CT chest, abdomen and pelvis with new patchy nodular areas of airspace consolidation within the bilateral upper lobes, left greater than right, favored to be infectious or inflammatory. Interval enlargement of the peritoneal soft tissue masses within the pelvis compatible with progression of disease. Enlarging ventral hernia now containing multiple loops of small bowel and colon.   · 1/7/19 - Patient has not been coming in for weekly magnesium replacement as nursing has not been able to get ahold of her. Results of CT’s discussed with patient. Decision made to change her to IV chemotherapy with Carboplatin AUC-5 day 1 and pegylated liposomal Doxorubicin (Doxil) 30 mg/M2 IV day 1 to cycle every four weeks. Patient made aware of the limited choices of her treatment available.   · 1/31/19 - Echocardiogram showed LVEF 50-55% and otherwise normal echo Doppler study.   · 2/4/19 - New chemotherapy not initiated to date with difficulty  contacting patient for echocardiogram. Neulasta On-Pro 6 mg ordered with chemotherapy due to extensive prior exposure to chemotherapy, increasing risk of febrile neutropenia, history of neutropenic events on prior chemotherapy regimens.   · 2/15/19 - Patient started cycle 1 chemotherapy with Neulasta support.   · 3/6/19 -  of 28 (0-35).   · 3/15/19 - Cycle 2 Carboplatin with Liposomal Doxorubicin (Doxil) and Neulasta support initiated.     · 4/10/19 - Patient was requested to followup with Dr. Queen regarding hypotension and blood pressure medication dosing. Patient appears to be tolerating treatment well with cytopenias not requiring dose adjustments. No Doxil-related skin or mucus membrane toxicities or other significant toxicities to date.   · 4/12/19 - Cycle 3 chemotherapy given.   · 4/16/19 - CT chest, abdomen and pelvis with no evidence of active metastasis to the chest. Several tree-in-bud nodules within the periphery of the inferior right upper lobe likely infectious or inflammatory. Disease burden within abdomen and pelvis stable to slightly increased from prior CT. Specifically, a soft tissue mass along the right rectus muscle slightly increased in bulk. Multiple ventral hernias, some containing loops of bowel, with no evidence of acute bowel obstruction.   · 5/8/19 - Discussed CT results with patient. Overall stable disease and would like to continue same treatment. Dove soap and Hydrocortisone Cream p.r.n. prescribed for scattered rash on back.   · 5/10/19 - Cycle 4 Carboplatin and Liposomal Doxorubicin initiated.   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 It was ER positive, HER2/zuleima negative, PD-L1 negative. There was TOPO1 positivity and TUBB3 positivity. TMB was low (two mutations per megabyte MUTS/MB) and MSI was stable. There were 13 therapies considered with increased benefit. The 13 therapies with increased benefit  included Fulvestrant, Irinotecan, Letrozole, Topotecan, Anastrazole, Exemestane, Tamoxifen, all of which were referenced to [facility] as well as Abemaciclib, Everolimus, Gefitinib, Palbociclib, Ribociclib and Toremifene.     · 6/5/19 echocardiogram with preserved LV systolic function with EF around 60%.  · 6/7/19 cycle 5 chemotherapy with Neulasta support given.  Patient continued on mag oxide 400 mg p.o. twice daily and weekly IV 3 g mag sulfate infusions for persistent hypomagnesemia.  · 7/5/2019- cycle 6 chemotherapy with carboplatin and doxorubicin with Neulasta port initiated.  Ca125:  21 (0-35).  · 7/23/2019-CT chest abdomen and pelvis compared to CT from 4/16/2019 revealed multiple small pulmonary nodules unchanged from prior examination within the right upper and left lower lobes.  Complex ventral hernia similar to prior exam.  Soft tissue nodule along the right lateral margin of the right of the midline measuring 12 x 10 mm unchanged in size.  Irregular soft tissue nodular thickening along the margin of the most inferior aspect of the complex ventral hernia with thickness measuring up to 13 mm similar to prior examination.  Extensive calcified and soft tissue mass within the left lower quadrant decrease in size now measuring 2.6 x 2.3 cm previously 3.0 x 2.5 cm.  Partially calcified mass involving the right rectus sheath measuring 3.1 x 2.7 cm previously measured 3.3 x 2.9 cm.  Densely calcified mass measuring 2.1 x 1.6 cm unchanged previously measured 2 x 1.7 cm.  Right external iliac chain lymph node measuring 9 mm previously measured 5 mm.  · 8/2/2019 cycle 7 chemotherapy given.  · 8/30/2019-cycle 8 chemotherapy with carboplatin and doxorubicin with Neulasta support given.  · 9/27/2019 cycle 9 chemotherapy given.  · 10/1/19 - Echocardiogram showed Normal LV size and contractility EF of 60-65%. Normal RV size. Borderline left atrial enlargement. Aortic valve, mitral valve, tricuspid valve appears structurally  normal, no significant regurgitation seen.No pericardial effusion seen. Proximal aorta appears normal in size.  · 10/18/19 - Magnesium 1.5 (1.8-2.5).  IV magnesium replacement continued.  · 10/25/2019 cycle 10 chemotherapy given.  · 10/29/2019 patient had CT scan of the chest abdomen and pelvis-there is a new 2 mm nodule in the left lower lobe with a stable 2 mm nodule in the left lower lobe.  Follow-up in 6 months was recommended.  Stable multiple soft tissue density and calcified nodules within the ventral abdominal wall and left retroperitoneal fat consistent with nonviable metastatic disease.  These nodules have either decreased in size or stable.  · 11/22/2019 10 received cycle 11 of chemotherapy with Doxil and carboplatinum with Neulasta  · 12/8/2019 to 12/11/2019 patient was admitted to the hospital for neutropenic fever.  · 12/20/2019 patient received cycle 12 of chemotherapy with Doxil and carboplatinum with Neulasta  · 1/13/20: 2 D echo was normal with EF 56% to 60%  · 1/24/20-Patient received cycle 13 of combination chemotherapy with carboplatinum and Doxil  · 2/3/2020 patient had a  which was 25.4  · 2/21/2020-patient received cycle 14 of chemotherapy with carboplatinum and Doxil  · 3/6/2020 patient had CT scan of the chest, abdomen and pelvis this revealed a 3 mm nodule in the left lower lobe present on prior CT of the abdomen unchanged from July 2019.  Stable 3 mm right upper lobe nodule.  There is a lymph node in the AP window measuring 8 x 9 mm prominent left hilar lymph node measuring 12 mm previously was 7 x 7 mm no significant adenopathy was seen in the abdomen there is an area of irregular soft tissue in the left paramidline ventral hernia which is stable measuring 5.7 cm partially calcified mass in the right rectus measuring 3 x 2.5 cm which is also stable the prominent lymph nodes in the left mid hilum and mediastinum may be reactive or metastatic disease  · 4/17/2020-patient had cycle 15  of single agent carboplatinum  · 5/26/2020 patient had CT scan of the chest, abdomen and pelvis showed 3 new lung nodules measuring 7 mm in the left upper lobe, 5 mm in the left lower lobe and 4 mm in the right lower lobe.  There were additional small lung nodules which were unchanged.  Mildly prominent mediastinal and bilateral hilar lymph nodes mildly increased in size.  Right hilar node 9 mm previously was 5 mm.  Carinal node 9 mm previously was 6 mm.  The nodule at the right side of the hernia sac has increased to 1.5 cm from 1.2 cm.  Also a nodule in the subcutaneous fat currently measures 2.4 was previously 2 cm.  · 5/15/2020-patient received cycle 16 of carboplatinum  · Since the last visit in the office patient patient developed dizzy spell and fell. For that reason she was taken to the emergency room at [place].    · 7/3/2020 she had brain MRI which showed an 8 mm focus of abnormality in the left aspect of the posterior fossa corresponding to a dural based enhancing lesion thought to represent a calcified meningioma vessels calcified dural based metastasis.  This lesion has a slight local mass-effect and a small amount of surrounding edema.  There is also a 4 to 5 mm rounded focus of abnormal nodular enhancement along the cortex of the left parietal lobe but no significant mass-effect.  This is nonspecific could represent neoplastic process, infectious/inflammatory process or granulomatous disease.  · Patient was seen by neurosurgery, follow-up brain MRI in 8 weeks has been recommended by Dr. Hartman  · 7/9/2020 patient was initiated on single agent topotecan cycle 1 day 1  · 7/16/2020 she received the 8 topotecan  · 8/6/2020 patient received cycle 1 day 15 of topotecan       2. Deep vein thrombosis of left upper extremity diagnosis established in October of 2013.  · 10/16/13 - Venous Doppler of left upper extremity.  Impression:  Deep vein thrombosis involving the subclavian, axillary and brachial veins on the  left side, superficial vein thrombosis involving the left basilic vein.  · 10/16/13 - Order written for Lovenox 120 mg subcutaneously daily until INR is in therapeutic range.  Order written for Coumadin 5 mg p.o. daily.  The patient is to follow PT and INR protocol.  · 5/20/16 - Venous Doppler bilateral lower extremities normal.  · 7/30/19 - Patient continued on Coumadin following the INR protocol.      3. Anemia diagnosis established in November 2013 and pernicious anemia diagnosis established March 2016.   · 11/15/13 - Hemoglobin is 7.8.  · 12/2/13 - Orders written to start Aranesp 200 mg subcutaneous every two weeks due to low hemoglobin secondary to chemo induced anemia.  Hemoglobin is 9.7.  Anemia workup is ordered.  · 12/03/13 - Ferritin 179.8 (N), folic acid 8.3 (N), vitamin B12 314, iron 104 (N), TIBC 298 (N), iron saturation 35 (N), Reticulocyte count 0.88 (N).  EPO level 124 (N).  Haptoglobin 22 (H).  · 10/22/14 - Hemoglobin 12.5, hematocrit 37.3, MCV 91.6.  · 10/23/14 - Anemia resolved.   · 3/8/16 - WBC 5.8, hemoglobin 11.7, MCV 90.3, platelet count 245,000. Vitamin B12 of 189 (180-914), ferritin 61 (), iron 91 (), TIBC 345 (228-428).   · 3/15/16 -  ().        · 3/22/16 - Intrinsic factor antibody positive, antiparietal antibody negative. Vitamin B12 at 1000 mcg IM weekly started, but the patient after receiving first dose on 3/22/16 did not come back for injections until 4/13/16.   · 4/13/16 - WBC 5.1, hemoglobin 12.5, MCV 87.9, platelet count 201,000. Patient given B12 injection and then continued at home.   · 5/10/16 - WBC 5.1, hemoglobin 12.5, platelet count 201,000.   · 6/14/16 - Monthly Vitamin B12 injections continued at home.   · 7/11/16 - WBC 5.48, hemoglobin 12.7, MCV 86.2, platelet count 200,000.   · 8/16/16 - Patient continued on monthly Vitamin B12 injections at home.   · 1/5/17 - WBC 2.6 with 38% neutrophils, 56% lymphocytes, 5% monocytes, hemoglobin 10.5, .3,  platelet count 63,000. Patient continued on Vitamin B12 injections 1000 mcg IM monthly at home.   · 3/20/17 - Retic 0.41 (0.5-1.5), creatinine 1.0 (0.4-1.0). Iron 12 (), TIBC 270 (228-428), ferritin 259 (), haptoglobin 340 (), TSH 0.43 (0.34-5.6), folate 6.0 (5.9-24.8). Serum protein electrophoresis revealed decreased gammaglobulins. Serum EDU had no monoclonal gammopathy identified. Stool Hemoccult was negative.   · 6/8/17 - WBC 6.3, hemoglobin 8.3, MCV 92.4, platelet count 50,000. Procrit 40,000 units subq weekly added for chemotherapy-induced anemia. Patient continued on Vitamin B12 at 1000 mcg IM monthly at home.   · 7/5/17 - Iron 33 (), TIBC 328 (228-428), ferritin 211 (), TSH 2.46 (0.34-5.6).       · 7/31/17 - WBC 5.0, hemoglobin 11.2, MCV 89.7, platelet count 217,000. We will continue with the Procrit 40,000 units subq weekly for chemo-induced anemia when the hemoglobin falls below 10.0 and the patient is also to continue with the Vitamin B12 at 1000 mcg IM monthly at home. Creatinine 1.24 (0.5-0.99).    · 8/28/17 - WBC 9.6, hemoglobin 8.7, MCV 93.7, platelet count 133,000. Patient is to continue with the Procrit 40,000 units subq weekly and will receive that today. She is also to continue with the Vitamin B12 at 1000 mcg IM monthly at home.  · 10/16/17 - WBC 7.5, hemoglobin 9.6, MCV 98.4, platelet count 195,000. Patient is to continue with Procrit 40,000 units subq weekly and will receive Procrit today.   · 1/1/18 - Creatinine 1.3 (0.4-1.0).    · 2/19/18 - Procrit given for hemoglobin 9.9.   · 2/26/18 - Continue Procrit 40,000 units weekly p.r.n. hemoglobin <10. Continue Vitamin B12 at 1000 mcg IM monthly at home.   · 3/26/18 - Hemoglobin 8.3. Procrit dose increased to 60,000 units weekly. Iron 29 (), TIBC 306 (228-428), and iron sat 9% (15-50). Iron 29 (), TIBC 306 (228-428), iron saturation 9% (15-50).   · 3/27/18 - Order written to start Ferrous sulfate 325 mg  p.o. b.i.d.    · 4/2/18 - Stool heme negative x3.   · 4/30/18 - Patient had not started Ferrous sulfate 325 mg p.o. b.i.d. and verbalized understanding to do so and to notify the office if side effects are not tolerable.   · 5/24/18 - Patient was hospitalized at [facility] between 5/24/18 and 5/26/18 with dark tarry stool. INR was subtherapeutic. She was given IV Protonix and Protonix 40 mg daily. She underwent an EGD by David Rogers M.D. revealing small hiatal hernia, small submucosal nodule near the cardia, erosive gastritis. Pathology on gastric antrum and body biopsy revealed iron pill gastritis and no evidence of Helicobacter pylori. She then underwent a colonoscopy revealing diverticulosis, hemorrhoids and surgical changes from previous resection. It was recommended to consider outpatient M2A if hemoglobin continued to drop.   · 6/4/18 - Order written to discontinue iron pills and to use Injectafer 750 mg IV days 1 and 8 for low iron sats. Order written for Procrit 40,000 units subq weekly. Iron 65 (), TIBC 328 (228-428), iron saturation 20% (15-50), ferritin 123 ().   · 6/29/18 - Discussed patient’s intolerance of oral iron due to gastritis. Oral iron discontinued with plans for Injectafer should iron level drop in the future.   · 11/7/18 - Patient claims not to be taking Vitamin B12 injections at home. Asked to resume those.   · 12/3/18 - Patient reports she has not been taking her B12 injections for a couple of months. She needs some syringes and needles for that.   · 2/4/19 - Patient is uncertain if she is currently taking Ferrous Sulfate or not. Patient with history of intolerance and will follow hemoglobin.   · 5/8/19 - Ferritin 64 ().  · 8/27/2019- iron 52 (), iron saturation 14% (15-50), TIBC 364 (228-420), and ferritin 74 ().  Injectafer 750 mg IV day 1 and 8 due to oral iron intolerance ordered.  · 8/30/2019- Injectafer 750 mg IV day 1 given.  Hemoglobin 10.8.  At the  end of the infusion heart rate was 48 and the patient reported mild dizziness.  Patient was sent to the ED for evaluation with symptoms resolving rapidly.  Chest x-ray and CT head were negative for acute findings.  · 9/6/2019-Injectafer 750 mg IV day 8 given without adverse effects.  Hemoglobin 9.8.   · 9/25/19 - Iron 85 (). Iron saturation 25 (15-50). TIBC 335 (228-428). Ferritin  694 ().  Hemoglobin 10.3.  · 10/25/2019 hemoglobin 9.7.  Patient started on Retacrit 40,000 units subcu weekly.    Past Medical History:   Diagnosis Date   • Anemia 2013   • Cervical disc disorder 2013    Herniated disc, pinched nerve   • Clotting disorder (CMS/HCC) 2013    Low platelets from chemo   • Colon polyp 2013   • Deep vein thrombosis (CMS/HCC) 2013   • Diverticulosis 2013   • GERD (gastroesophageal reflux disease) 2016   • HL (hearing loss) 2016    I need hearing aids   • Hyperlipidemia 2013    Need my cholesterol rechecked   • Hypertension    • Joint pain 2013    Shoulder pain, torn rotator cuff   • Low back pain 2013   • Neuromuscular disorder (CMS/HCC) 2015    Shingles, pinched nerves in my neck   • Osteopenia 2014   • Osteoporosis    • Ovarian cancer (CMS/HCC)    • Pneumonia 2010    Have had it several times   • Spinal stenosis 2013    Cervical   • Urinary tract infection 2013    Have had several utis       Past Surgical History:   Procedure Laterality Date   • COLON SURGERY     • COLONOSCOPY  05/26/2018   • EXPLORATORY LAPAROTOMY     • HERNIA REPAIR     • HYSTERECTOMY     • OOPHORECTOMY           Current Outpatient Medications:   •  acetaminophen (TYLENOL) 500 MG tablet, Take 1,000 mg by mouth Every 6 (Six) Hours As Needed for Mild Pain ., Disp: , Rfl:   •  albuterol sulfate  (90 Base) MCG/ACT inhaler, Inhale 2 puffs Every 6 (Six) Hours As Needed for Wheezing., Disp: 6.7 g, Rfl: 0  •  atorvastatin (LIPITOR) 20 MG tablet, Take 20 mg by mouth every night at bedtime., Disp: , Rfl: 3  •  cyanocobalamin 1000  "MCG/ML injection, Inject 1,000 mcg into the appropriate muscle as directed by prescriber Every 28 (Twenty-Eight) Days., Disp: , Rfl:   •  famotidine (PEPCID) 40 MG tablet, TAKE 1 TABLET BY MOUTH EVERY MORNING BEFORE BREAKFAST, Disp: 90 tablet, Rfl: 1  •  KLOR-CON 20 MEQ CR tablet, Take 3 tablets by mouth every morning and take 2 tablets by mouth at bedtime., Disp: 150 tablet, Rfl: 5  •  loperamide (Imodium A-D) 2 MG tablet, Take 1 tablet by mouth 3 (Three) Times a Day As Needed for Diarrhea., Disp: 60 tablet, Rfl: 2  •  magnesium oxide (MAG-OX) 400 MG tablet, TAKE 1 TABLET BY MOUTH TWICE A DAY, Disp: 180 tablet, Rfl: 1  •  methylPREDNISolone (MEDROL, JONES,) 4 MG tablet, Take as directed on package instructions., Disp: 1 each, Rfl: 0  •  ondansetron ODT (ZOFRAN-ODT) 8 MG disintegrating tablet, Take 8 mg by mouth Every 8 (Eight) Hours As Needed for Nausea or Vomiting., Disp: , Rfl:   •  oxyCODONE (ROXICODONE) 10 MG tablet, Take 1 tablet by mouth 3 (Three) Times a Day As Needed for Moderate Pain ., Disp: 90 tablet, Rfl: 0  •  pregabalin (LYRICA) 100 MG capsule, Take 1 capsule by mouth 3 (Three) Times a Day., Disp: 90 capsule, Rfl: 2  •  sertraline (ZOLOFT) 50 MG tablet, TAKE 1 TABLET BY MOUTH EVERY EVENING BEFORE BED, Disp: 90 tablet, Rfl: 1  •  Syringe 23G X 1\" 3 ML misc, INJECT 1 ML B-12 ONCE MONTHLY, Disp: 1 each, Rfl: 3  •  temazepam (RESTORIL) 30 MG capsule, TAKE 1 CAPSULE BY MOUTH AT NIGHT AS NEEDED FOR SLEEP., Disp: 30 capsule, Rfl: 1  •  triamterene-hydrochlorothiazide (DYAZIDE) 37.5-25 MG per capsule, TAKE 1 CAPSULE BY MOUTH EVERY DAY, Disp: 90 capsule, Rfl: 1  •  warfarin (COUMADIN) 1 MG tablet, Take 1 tablet by mouth Daily., Disp: 45 tablet, Rfl: 2  •  warfarin (COUMADIN) 5 MG tablet, Active thrombosis  Indications: DVT/PE (active thrombosis), Disp: 30 tablet, Rfl: 0  No current facility-administered medications for this visit.     Facility-Administered Medications Ordered in Other Visits:   •  heparin " injection 500 Units, 500 Units, Intravenous, PRN, Cindy Ball MD, 500 Units at 08/13/20 1143  •  sodium chloride 0.9 % flush 20 mL, 20 mL, Intravenous, PRNQuinn Ifeoma Roseline, MD, 20 mL at 08/13/20 0938    Allergies   Allergen Reactions   • Morphine Nausea Only and Hives   • Penicillin V Potassium Rash   • Sulfa Antibiotics Rash   • Levaquin [Levofloxacin] Nausea Only and Dizziness     When taken with Coumadin   • Amoxicillin Rash   • Norco [Hydrocodone-Acetaminophen] Rash       Family History   Problem Relation Age of Onset   • Hypertension Mother    • Cancer Father    • Hypertension Father    • Hypertension Sister    • Hypertension Brother    • Stroke Brother        Cancer-related family history includes Cancer in her father.    Social History     Tobacco Use   • Smoking status: Never Smoker   • Smokeless tobacco: Never Used   Substance Use Topics   • Alcohol use: No     Frequency: Never     Comment: caffeine 32-64oz qd   • Drug use: No       I have reviewed and confirmed the accuracy of the patient's history: Chief complaint, HPI and ROS as entered by the MA/LPN/RN. Cindy Ball MD 08/13/20     SUBJECTIVE:    The patient is here for a follow up appointment.  Her IV Darrel infusion.  She does not have any specific complaints today.  She denies any fevers, chills, nausea vomiting abdominal pain.  Slow but she is tolerating her chemotherapy fairly well.  Her energy level also remains good.    REVIEW OF SYSTEMS:  Review of Systems   Constitutional: Negative for chills and fever.   HENT: Negative for ear pain, mouth sores, nosebleeds and sore throat.    Eyes: Negative for photophobia and visual disturbance.   Respiratory: Negative for wheezing and stridor.    Cardiovascular: Negative for chest pain and palpitations.   Gastrointestinal: Negative for abdominal pain, diarrhea, nausea and vomiting.   Endocrine: Negative for cold intolerance and heat intolerance.   Genitourinary: Negative for  dysuria and hematuria.   Musculoskeletal: Negative for joint swelling and neck stiffness.   Skin: Negative for color change.   Neurological: Negative for seizures and syncope.   Hematological: Negative for adenopathy.   Psychiatric/Behavioral: Negative for agitation, confusion and hallucinations.     Review of system as documented above    OBJECTIVE:    There were no vitals filed for this visit.    ECOG  (1) Restricted in physically strenuous activity, ambulatory and able to do work of light nature    Physical Exam   Constitutional: She is oriented to person, place, and time. No distress.   HENT:   Head: Normocephalic and atraumatic.   Eyes: Conjunctivae and EOM are normal. Right eye exhibits no discharge. Left eye exhibits no discharge. No scleral icterus.   Neck: Normal range of motion. Neck supple. No thyromegaly present.   Cardiovascular: Normal rate, regular rhythm and normal heart sounds. Exam reveals no gallop and no friction rub.   Pulmonary/Chest: Effort normal. No stridor. No respiratory distress. She has no wheezes.   Abdominal: Soft. Bowel sounds are normal. She exhibits no mass. There is no tenderness. There is no rebound and no guarding.   Musculoskeletal: Normal range of motion. She exhibits no tenderness.   Lymphadenopathy:     She has no cervical adenopathy.   Neurological: She is alert and oriented to person, place, and time. She exhibits normal muscle tone.   Skin: Skin is warm. She is not diaphoretic. No erythema.   Psychiatric: She has a normal mood and affect. Her behavior is normal. Judgment and thought content normal.   Nursing note and vitals reviewed.    Physical exam as documented above    RECENT LABS  WBC   Date Value Ref Range Status   08/13/2020 4.80 3.40 - 10.80 10*3/mm3 Final   07/05/2020 4.04 (L) 4.5 - 11.0 10*3/uL Final     RBC   Date Value Ref Range Status   08/13/2020 3.33 (L) 3.77 - 5.28 10*6/mm3 Final   07/05/2020 3.68 (L) 4.0 - 5.2 10*6/uL Final     Hemoglobin   Date Value Ref  Range Status   08/13/2020 10.4 (L) 12.0 - 15.9 g/dL Final   07/05/2020 11.5 (L) 12.0 - 16.0 g/dL Final     Hematocrit   Date Value Ref Range Status   08/13/2020 33.0 (L) 34.0 - 46.6 % Final   07/05/2020 35.3 (L) 36.0 - 46.0 % Final     MCV   Date Value Ref Range Status   08/13/2020 99.1 (H) 79.0 - 97.0 fL Final   07/05/2020 95.9 80.0 - 100.0 fL Final     MCH   Date Value Ref Range Status   08/13/2020 31.2 26.6 - 33.0 pg Final   07/05/2020 31.3 26.0 - 34.0 pg Final     MCHC   Date Value Ref Range Status   08/13/2020 31.5 31.5 - 35.7 g/dL Final   07/05/2020 32.6 31.0 - 37.0 g/dL Final     RDW   Date Value Ref Range Status   08/13/2020 16.2 (H) 12.3 - 15.4 % Final   07/05/2020 15.9 12.0 - 16.8 % Final     RDW-SD   Date Value Ref Range Status   08/13/2020 56.8 (H) 37.0 - 54.0 fl Final     MPV   Date Value Ref Range Status   08/13/2020 10.1 6.0 - 12.0 fL Final   07/05/2020 10.2 6.7 - 10.8 fL Final     Platelets   Date Value Ref Range Status   08/13/2020 105 (L) 140 - 450 10*3/mm3 Final   07/05/2020 158 140 - 440 10*3/uL Final     Neutrophil Rel %   Date Value Ref Range Status   07/05/2020 54.0 45 - 80 % Final     Neutrophil %   Date Value Ref Range Status   08/13/2020 54.8 42.7 - 76.0 % Final     Lymphocyte Rel %   Date Value Ref Range Status   07/05/2020 30.4 15 - 50 % Final     Lymphocyte %   Date Value Ref Range Status   08/13/2020 32.7 19.6 - 45.3 % Final     Monocyte Rel %   Date Value Ref Range Status   07/05/2020 12.4 0 - 15 % Final     Monocyte %   Date Value Ref Range Status   08/13/2020 10.8 5.0 - 12.0 % Final     Eosinophil %   Date Value Ref Range Status   08/13/2020 1.3 0.3 - 6.2 % Final   07/05/2020 3.0 0 - 7 % Final     Basophil Rel %   Date Value Ref Range Status   07/05/2020 0.2 0 - 2 % Final     Basophil %   Date Value Ref Range Status   08/13/2020 0.4 0.0 - 1.5 % Final     Immature Grans %   Date Value Ref Range Status   07/05/2020 0.0 0 % Final     Neutrophils Absolute   Date Value Ref Range Status    07/05/2020 2.18 2.0 - 8.8 10*3/uL Final     Neutrophils, Absolute   Date Value Ref Range Status   08/13/2020 2.63 1.70 - 7.00 10*3/mm3 Final     Lymphocytes Absolute   Date Value Ref Range Status   07/05/2020 1.23 0.7 - 5.5 10*3/uL Final     Lymphocytes, Absolute   Date Value Ref Range Status   08/13/2020 1.57 0.70 - 3.10 10*3/mm3 Final     Monocytes Absolute   Date Value Ref Range Status   07/05/2020 0.50 0.0 - 1.7 10*3/uL Final     Monocytes, Absolute   Date Value Ref Range Status   08/13/2020 0.52 0.10 - 0.90 10*3/mm3 Final     Eosinophils Absolute   Date Value Ref Range Status   07/05/2020 0.12 0.0 - 0.8 10*3/uL Final     Eosinophils, Absolute   Date Value Ref Range Status   08/13/2020 0.06 0.00 - 0.40 10*3/mm3 Final     Basophils Absolute   Date Value Ref Range Status   07/05/2020 0.01 0.0 - 0.2 10*3/uL Final     Basophils, Absolute   Date Value Ref Range Status   08/13/2020 0.02 0.00 - 0.20 10*3/mm3 Final     Immature Grans, Absolute   Date Value Ref Range Status   07/05/2020 0.00 <1 10*3/uL Final     nRBC   Date Value Ref Range Status   07/05/2020 0 0 /100(WBC) Final   12/11/2019 0.3 (H) 0.0 - 0.2 /100 WBC Final       Lab Results   Component Value Date    GLUCOSE 80 07/09/2020    BUN  07/09/2020      Comment:      Testing performed by alternate method    BUN 14 07/09/2020    CREATININE 0.87 07/09/2020    EGFRIFNONA 66 07/09/2020    EGFRIFAFRI >60 06/07/2019    BCR  07/09/2020      Comment:      Testing not performed    K 3.8 07/09/2020    CO2 28.0 07/09/2020    CALCIUM 9.7 07/09/2020    ALBUMIN 4.00 07/09/2020    LABIL2 1.3 07/03/2020    AST 21 07/09/2020    ALT 10 07/09/2020         Assessment/Plan     Malignant neoplasm of right ovary (CMS/HCC)  - CBC & Differential    Pancytopenia due to antineoplastic chemotherapy (CMS/HCC)  - CBC & Differential    Antineoplastic chemotherapy induced anemia  - CBC & Differential    Pernicious anemia  - CBC & Differential      ASSESSMENT:    1. Recurrent ovarian cancer  on carboplatinum, Doxil.  Patient had had carboplatinum Taxol, carbo Taxotere, Gemzar single agent, Niraparib and was on Doxil and carboplatinum.  Doxil was discontinued due to approaching of lifetime dosing.  Refer to paradigm testing for future options at time of progression.  Patient has had progression of disease and therefore platinum was discontinued.  Topotecan single agent has been recommended. Will continue the same  2. Currently on single agent topotecan  3. Nonspecific lesions in the brain concern for possible small metastases 4 to 5 mm on the left parietal lobe, 8 mm nodular density in the dura on the left posterior fossa may be meningioma or calcified metastasis: Neurosurgery following Dr. Hartman.  Repeat brain MRI in 8 weeks  4. Iron deficiency anemia: Intermittently on  iron  5. Pancytopenia: Due to chemotherapy: On Procrit  6. Hypomagnesemia: Continue to monitor levels and infuse as needed  7. B12 deficiency on parenteral B12 injections  8. Rash on face likely drug induced:resolved  9. Monitoring for chemotherapy toxicities  10. Assessment have been reviewed and updated      DISCUSSION:    I have reviewed her restaging CT scans.  Patient has new lung nodules and also enlarging nodules in the abdomen and pelvis consistent with progressing disease.  For that reason we will discontinue single agent carboplatinum and transition patient to topotecan.  Also note patient was on carboplatinum every 4 weeks with Doxil.  Doxil was discontinued due to approaching lifetime maximum dosing.  In the future carboplatinum may be reused but at 3 weekly intervals.    Discussed side effects of chemotherapy to include but not limited to  Chemotherapy side effects include, but not limited to, nausea, vomiting, bone marrow suppression, which can result in blood, platelet transfusion. There is also risk of permanent bone marrow destruction, which can cause myelodysplastic side effects of her new chemotherapy or leukemia years  down the line. There is risk of infection which can result in hospitalization and even death. There is also risk of fatigue, asthenia, alopecia which could become permanent. Chemo will help to reduce risk of relapse of cancer, but does not eliminate risk completely.    Specifically topotecan can lead to severe bone marrow suppression which may result in platelet transfusions    PLANS:       Refered to Dr. Delong for possible brain metastasis  Continue chemotherapy with topotecan but with 20% dose reduction due to cytopenias: Order given  Brain MRI follow-up in 6 to 8 weeks: Dr Hartman Neurosurgery 1 week this is due first week in September.  We will go ahead and order   monthly: Check levels today.  Ongoing  Continue magnesium oxide 400 mg three times a day and weekly IV replacement as needed.   Continue Coumadin  Continue b12 injections IM monthly. Transitioned injections to be done at the cancer center  Continue Retacrit 40,000 units subcu weekly for hemoglobin less than 10, For chemotherapy-induced anemia  Magic mouthwash PRN for mucositis  All questions answered to the best of my abilities           I have reviewed labs results, imaging, vitals, and medications with the patient today. Will follow up in 3 weeks  with me.    All questions answered to the best of my abilities    Patient verbalized understanding and is in agreement of the above plans.

## 2020-08-13 ENCOUNTER — TELEPHONE (OUTPATIENT)
Dept: ONCOLOGY | Facility: CLINIC | Age: 65
End: 2020-08-13

## 2020-08-13 ENCOUNTER — OFFICE VISIT (OUTPATIENT)
Dept: ONCOLOGY | Facility: CLINIC | Age: 65
End: 2020-08-13

## 2020-08-13 ENCOUNTER — APPOINTMENT (OUTPATIENT)
Dept: LAB | Facility: HOSPITAL | Age: 65
End: 2020-08-13

## 2020-08-13 ENCOUNTER — HOSPITAL ENCOUNTER (OUTPATIENT)
Dept: ONCOLOGY | Facility: HOSPITAL | Age: 65
Setting detail: INFUSION SERIES
Discharge: HOME | End: 2020-08-13

## 2020-08-13 VITALS
HEIGHT: 65 IN | BODY MASS INDEX: 29.99 KG/M2 | HEART RATE: 66 BPM | RESPIRATION RATE: 20 BRPM | TEMPERATURE: 98 F | WEIGHT: 180 LBS | SYSTOLIC BLOOD PRESSURE: 113 MMHG | DIASTOLIC BLOOD PRESSURE: 61 MMHG

## 2020-08-13 DIAGNOSIS — D51.0 PERNICIOUS ANEMIA: ICD-10-CM

## 2020-08-13 DIAGNOSIS — R51.9 CHRONIC NONINTRACTABLE HEADACHE, UNSPECIFIED HEADACHE TYPE: ICD-10-CM

## 2020-08-13 DIAGNOSIS — C56.1 MALIGNANT NEOPLASM OF RIGHT OVARY (HCC): Primary | ICD-10-CM

## 2020-08-13 DIAGNOSIS — D64.81 ANTINEOPLASTIC CHEMOTHERAPY INDUCED ANEMIA: ICD-10-CM

## 2020-08-13 DIAGNOSIS — I82.722 CHRONIC DEEP VEIN THROMBOSIS (DVT) OF LEFT UPPER EXTREMITY, UNSPECIFIED VEIN (HCC): Primary | ICD-10-CM

## 2020-08-13 DIAGNOSIS — C56.1 MALIGNANT NEOPLASM OF RIGHT OVARY (HCC): ICD-10-CM

## 2020-08-13 DIAGNOSIS — T45.1X5A ANTINEOPLASTIC CHEMOTHERAPY INDUCED ANEMIA: ICD-10-CM

## 2020-08-13 DIAGNOSIS — G89.29 CHRONIC NONINTRACTABLE HEADACHE, UNSPECIFIED HEADACHE TYPE: ICD-10-CM

## 2020-08-13 DIAGNOSIS — T45.1X5A PANCYTOPENIA DUE TO ANTINEOPLASTIC CHEMOTHERAPY (HCC): ICD-10-CM

## 2020-08-13 DIAGNOSIS — E83.42 HYPOMAGNESEMIA: ICD-10-CM

## 2020-08-13 DIAGNOSIS — D61.810 PANCYTOPENIA DUE TO ANTINEOPLASTIC CHEMOTHERAPY (HCC): ICD-10-CM

## 2020-08-13 DIAGNOSIS — Z95.828 PORT-A-CATH IN PLACE: ICD-10-CM

## 2020-08-13 DIAGNOSIS — Z79.899 ENCOUNTER FOR MEDICATION MANAGEMENT: ICD-10-CM

## 2020-08-13 LAB
BASOPHILS # BLD AUTO: 0.02 10*3/MM3 (ref 0–0.2)
BASOPHILS NFR BLD AUTO: 0.4 % (ref 0–1.5)
CANCER AG125 SERPL QL: 97.3 U/ML (ref 0–38.1)
DEPRECATED RDW RBC AUTO: 56.8 FL (ref 37–54)
EOSINOPHIL # BLD AUTO: 0.06 10*3/MM3 (ref 0–0.4)
EOSINOPHIL NFR BLD AUTO: 1.3 % (ref 0.3–6.2)
ERYTHROCYTE [DISTWIDTH] IN BLOOD BY AUTOMATED COUNT: 16.2 % (ref 12.3–15.4)
HCT VFR BLD AUTO: 33 % (ref 34–46.6)
HGB BLD-MCNC: 10.4 G/DL (ref 12–15.9)
INR PPP: 1.5
LYMPHOCYTES # BLD AUTO: 1.57 10*3/MM3 (ref 0.7–3.1)
LYMPHOCYTES NFR BLD AUTO: 32.7 % (ref 19.6–45.3)
MAGNESIUM SERPL-MCNC: 1.4 MG/DL (ref 1.6–2.4)
MCH RBC QN AUTO: 31.2 PG (ref 26.6–33)
MCHC RBC AUTO-ENTMCNC: 31.5 G/DL (ref 31.5–35.7)
MCV RBC AUTO: 99.1 FL (ref 79–97)
MONOCYTES # BLD AUTO: 0.52 10*3/MM3 (ref 0.1–0.9)
MONOCYTES NFR BLD AUTO: 10.8 % (ref 5–12)
NEUTROPHILS NFR BLD AUTO: 2.63 10*3/MM3 (ref 1.7–7)
NEUTROPHILS NFR BLD AUTO: 54.8 % (ref 42.7–76)
PLATELET # BLD AUTO: 105 10*3/MM3 (ref 140–450)
PMV BLD AUTO: 10.1 FL (ref 6–12)
RBC # BLD AUTO: 3.33 10*6/MM3 (ref 3.77–5.28)
WBC # BLD AUTO: 4.8 10*3/MM3 (ref 3.4–10.8)

## 2020-08-13 PROCEDURE — 85610 PROTHROMBIN TIME: CPT | Performed by: NURSE PRACTITIONER

## 2020-08-13 PROCEDURE — 96365 THER/PROPH/DIAG IV INF INIT: CPT

## 2020-08-13 PROCEDURE — 99214 OFFICE O/P EST MOD 30 MIN: CPT | Performed by: INTERNAL MEDICINE

## 2020-08-13 PROCEDURE — 85025 COMPLETE CBC W/AUTO DIFF WBC: CPT | Performed by: INTERNAL MEDICINE

## 2020-08-13 PROCEDURE — 25010000002 MAGNESIUM SULFATE 2 GM/50ML SOLUTION: Performed by: INTERNAL MEDICINE

## 2020-08-13 PROCEDURE — 83735 ASSAY OF MAGNESIUM: CPT | Performed by: INTERNAL MEDICINE

## 2020-08-13 PROCEDURE — 25010000003 HEPARIN LOCK FLUSH PER 10 UNITS: Performed by: INTERNAL MEDICINE

## 2020-08-13 PROCEDURE — 86304 IMMUNOASSAY TUMOR CA 125: CPT | Performed by: INTERNAL MEDICINE

## 2020-08-13 RX ORDER — SODIUM CHLORIDE 0.9 % (FLUSH) 0.9 %
20 SYRINGE (ML) INJECTION AS NEEDED
Status: CANCELLED | OUTPATIENT
Start: 2020-08-13

## 2020-08-13 RX ORDER — SODIUM CHLORIDE 0.9 % (FLUSH) 0.9 %
20 SYRINGE (ML) INJECTION AS NEEDED
Status: DISCONTINUED | OUTPATIENT
Start: 2020-08-13 | End: 2020-08-14 | Stop reason: HOSPADM

## 2020-08-13 RX ORDER — SODIUM CHLORIDE 9 MG/ML
250 INJECTION, SOLUTION INTRAVENOUS ONCE
Status: COMPLETED | OUTPATIENT
Start: 2020-08-13 | End: 2020-08-13

## 2020-08-13 RX ORDER — MAGNESIUM SULFATE HEPTAHYDRATE 40 MG/ML
2 INJECTION, SOLUTION INTRAVENOUS ONCE
Status: COMPLETED | OUTPATIENT
Start: 2020-08-13 | End: 2020-08-13

## 2020-08-13 RX ORDER — SODIUM CHLORIDE 9 MG/ML
250 INJECTION, SOLUTION INTRAVENOUS ONCE
Status: CANCELLED | OUTPATIENT
Start: 2020-08-20

## 2020-08-13 RX ORDER — HEPARIN SODIUM (PORCINE) LOCK FLUSH IV SOLN 100 UNIT/ML 100 UNIT/ML
500 SOLUTION INTRAVENOUS AS NEEDED
Status: CANCELLED | OUTPATIENT
Start: 2020-08-13

## 2020-08-13 RX ORDER — HEPARIN SODIUM (PORCINE) LOCK FLUSH IV SOLN 100 UNIT/ML 100 UNIT/ML
500 SOLUTION INTRAVENOUS AS NEEDED
Status: DISCONTINUED | OUTPATIENT
Start: 2020-08-13 | End: 2020-08-14 | Stop reason: HOSPADM

## 2020-08-13 RX ADMIN — MAGNESIUM SULFATE HEPTAHYDRATE 2 G: 40 INJECTION, SOLUTION INTRAVENOUS at 09:58

## 2020-08-13 RX ADMIN — Medication 20 ML: at 09:38

## 2020-08-13 RX ADMIN — SODIUM CHLORIDE 250 ML: 900 INJECTION, SOLUTION INTRAVENOUS at 10:01

## 2020-08-13 RX ADMIN — HEPARIN 500 UNITS: 100 SYRINGE at 11:43

## 2020-08-13 NOTE — TELEPHONE ENCOUNTER
Patient got home from seeing dr. grissom and patients daughter stated that patient does see a neurologist and is having a Mri done next week       Patient phone # [phone]

## 2020-08-13 NOTE — TELEPHONE ENCOUNTER
Patient has an appt on 08/14/20 that needs to be canceled. Patient will call back to reschedule [phone]

## 2020-08-17 ENCOUNTER — TELEPHONE (OUTPATIENT)
Dept: ONCOLOGY | Facility: CLINIC | Age: 65
End: 2020-08-17

## 2020-08-17 NOTE — TELEPHONE ENCOUNTER
LVM for pt to call my direct line to schedule her next f/u with Dr. Ball in September.  Waiting for pt to return my call.  cc

## 2020-08-20 ENCOUNTER — LAB (OUTPATIENT)
Dept: LAB | Facility: HOSPITAL | Age: 65
End: 2020-08-20

## 2020-08-20 ENCOUNTER — HOSPITAL ENCOUNTER (OUTPATIENT)
Dept: ONCOLOGY | Facility: HOSPITAL | Age: 65
Setting detail: INFUSION SERIES
Discharge: HOME | End: 2020-08-20

## 2020-08-20 VITALS
DIASTOLIC BLOOD PRESSURE: 78 MMHG | HEIGHT: 65 IN | HEART RATE: 71 BPM | RESPIRATION RATE: 20 BRPM | TEMPERATURE: 97.7 F | SYSTOLIC BLOOD PRESSURE: 132 MMHG | BODY MASS INDEX: 29.99 KG/M2 | WEIGHT: 180 LBS

## 2020-08-20 DIAGNOSIS — D51.0 PERNICIOUS ANEMIA: ICD-10-CM

## 2020-08-20 DIAGNOSIS — I82.722 CHRONIC DEEP VEIN THROMBOSIS (DVT) OF LEFT UPPER EXTREMITY, UNSPECIFIED VEIN (HCC): ICD-10-CM

## 2020-08-20 DIAGNOSIS — C56.1 MALIGNANT NEOPLASM OF RIGHT OVARY (HCC): Primary | ICD-10-CM

## 2020-08-20 DIAGNOSIS — Z95.828 PORT-A-CATH IN PLACE: ICD-10-CM

## 2020-08-20 DIAGNOSIS — E83.42 HYPOMAGNESEMIA: ICD-10-CM

## 2020-08-20 LAB
ALBUMIN SERPL-MCNC: 3.8 G/DL (ref 3.5–5.2)
ALBUMIN/GLOB SERPL: 1.5 G/DL
ALP SERPL-CCNC: 133 U/L (ref 39–117)
ALT SERPL W P-5'-P-CCNC: 9 U/L (ref 1–33)
ANION GAP SERPL CALCULATED.3IONS-SCNC: 10 MMOL/L (ref 5–15)
AST SERPL-CCNC: 19 U/L (ref 1–32)
BASOPHILS # BLD AUTO: 0.01 10*3/MM3 (ref 0–0.2)
BASOPHILS NFR BLD AUTO: 0.3 % (ref 0–1.5)
BILIRUB SERPL-MCNC: <0.2 MG/DL (ref 0–1.2)
BUN SERPL-MCNC: 14 MG/DL (ref 8–23)
BUN SERPL-MCNC: ABNORMAL MG/DL
BUN/CREAT SERPL: ABNORMAL
CALCIUM SPEC-SCNC: 9 MG/DL (ref 8.6–10.5)
CHLORIDE SERPL-SCNC: 104 MMOL/L (ref 98–107)
CO2 SERPL-SCNC: 26 MMOL/L (ref 22–29)
CREAT SERPL-MCNC: 0.9 MG/DL (ref 0.57–1)
DEPRECATED RDW RBC AUTO: 57.2 FL (ref 37–54)
EOSINOPHIL # BLD AUTO: 0.08 10*3/MM3 (ref 0–0.4)
EOSINOPHIL NFR BLD AUTO: 2.3 % (ref 0.3–6.2)
ERYTHROCYTE [DISTWIDTH] IN BLOOD BY AUTOMATED COUNT: 16.5 % (ref 12.3–15.4)
GFR SERPL CREATININE-BSD FRML MDRD: 63 ML/MIN/1.73
GLOBULIN UR ELPH-MCNC: 2.6 GM/DL
GLUCOSE SERPL-MCNC: 78 MG/DL (ref 65–99)
HCT VFR BLD AUTO: 32.3 % (ref 34–46.6)
HGB BLD-MCNC: 10.3 G/DL (ref 12–15.9)
INR PPP: 2.6
LYMPHOCYTES # BLD AUTO: 1.13 10*3/MM3 (ref 0.7–3.1)
LYMPHOCYTES NFR BLD AUTO: 32.9 % (ref 19.6–45.3)
MAGNESIUM SERPL-MCNC: 1.5 MG/DL (ref 1.6–2.4)
MCH RBC QN AUTO: 31.1 PG (ref 26.6–33)
MCHC RBC AUTO-ENTMCNC: 31.9 G/DL (ref 31.5–35.7)
MCV RBC AUTO: 97.6 FL (ref 79–97)
MONOCYTES # BLD AUTO: 0.46 10*3/MM3 (ref 0.1–0.9)
MONOCYTES NFR BLD AUTO: 13.4 % (ref 5–12)
NEUTROPHILS NFR BLD AUTO: 1.75 10*3/MM3 (ref 1.7–7)
NEUTROPHILS NFR BLD AUTO: 51.1 % (ref 42.7–76)
PLATELET # BLD AUTO: 117 10*3/MM3 (ref 140–450)
PMV BLD AUTO: 11.2 FL (ref 6–12)
POTASSIUM SERPL-SCNC: 3.7 MMOL/L (ref 3.5–5.2)
PROT SERPL-MCNC: 6.4 G/DL (ref 6–8.5)
RBC # BLD AUTO: 3.31 10*6/MM3 (ref 3.77–5.28)
SODIUM SERPL-SCNC: 140 MMOL/L (ref 136–145)
WBC # BLD AUTO: 3.43 10*3/MM3 (ref 3.4–10.8)

## 2020-08-20 PROCEDURE — 36591 DRAW BLOOD OFF VENOUS DEVICE: CPT

## 2020-08-20 PROCEDURE — 85025 COMPLETE CBC W/AUTO DIFF WBC: CPT | Performed by: INTERNAL MEDICINE

## 2020-08-20 PROCEDURE — 85610 PROTHROMBIN TIME: CPT

## 2020-08-20 PROCEDURE — 96367 TX/PROPH/DG ADDL SEQ IV INF: CPT

## 2020-08-20 PROCEDURE — 96372 THER/PROPH/DIAG INJ SC/IM: CPT

## 2020-08-20 PROCEDURE — 96413 CHEMO IV INFUSION 1 HR: CPT

## 2020-08-20 PROCEDURE — 25010000002 TOPOTECAN 4 MG/4ML SOLUTION 4 ML VIAL: Performed by: INTERNAL MEDICINE

## 2020-08-20 PROCEDURE — 80053 COMPREHEN METABOLIC PANEL: CPT | Performed by: INTERNAL MEDICINE

## 2020-08-20 PROCEDURE — 83735 ASSAY OF MAGNESIUM: CPT | Performed by: INTERNAL MEDICINE

## 2020-08-20 PROCEDURE — 25010000002 MAGNESIUM SULFATE 2 GM/50ML SOLUTION: Performed by: INTERNAL MEDICINE

## 2020-08-20 PROCEDURE — 25010000002 DEXAMETHASONE SODIUM PHOSPHATE 120 MG/30ML SOLUTION: Performed by: INTERNAL MEDICINE

## 2020-08-20 PROCEDURE — 96365 THER/PROPH/DIAG IV INF INIT: CPT

## 2020-08-20 PROCEDURE — 25010000002 CYANOCOBALAMIN PER 1000 MCG: Performed by: INTERNAL MEDICINE

## 2020-08-20 PROCEDURE — 25010000003 HEPARIN LOCK FLUSH PER 10 UNITS: Performed by: INTERNAL MEDICINE

## 2020-08-20 RX ORDER — HEPARIN SODIUM (PORCINE) LOCK FLUSH IV SOLN 100 UNIT/ML 100 UNIT/ML
500 SOLUTION INTRAVENOUS AS NEEDED
Status: DISCONTINUED | OUTPATIENT
Start: 2020-08-20 | End: 2020-08-21 | Stop reason: HOSPADM

## 2020-08-20 RX ORDER — SODIUM CHLORIDE 9 MG/ML
250 INJECTION, SOLUTION INTRAVENOUS ONCE
Status: DISCONTINUED | OUTPATIENT
Start: 2020-08-20 | End: 2020-08-21 | Stop reason: HOSPADM

## 2020-08-20 RX ORDER — CYANOCOBALAMIN 1000 UG/ML
1000 INJECTION, SOLUTION INTRAMUSCULAR; SUBCUTANEOUS ONCE
Status: COMPLETED | OUTPATIENT
Start: 2020-08-20 | End: 2020-08-20

## 2020-08-20 RX ORDER — SODIUM CHLORIDE 9 MG/ML
250 INJECTION, SOLUTION INTRAVENOUS ONCE
Status: COMPLETED | OUTPATIENT
Start: 2020-08-20 | End: 2020-08-20

## 2020-08-20 RX ORDER — MAGNESIUM SULFATE HEPTAHYDRATE 40 MG/ML
2 INJECTION, SOLUTION INTRAVENOUS ONCE
Status: COMPLETED | OUTPATIENT
Start: 2020-08-20 | End: 2020-08-20

## 2020-08-20 RX ORDER — HEPARIN SODIUM (PORCINE) LOCK FLUSH IV SOLN 100 UNIT/ML 100 UNIT/ML
500 SOLUTION INTRAVENOUS AS NEEDED
Status: CANCELLED | OUTPATIENT
Start: 2020-08-20

## 2020-08-20 RX ORDER — SODIUM CHLORIDE 0.9 % (FLUSH) 0.9 %
20 SYRINGE (ML) INJECTION AS NEEDED
Status: CANCELLED | OUTPATIENT
Start: 2020-08-20

## 2020-08-20 RX ORDER — SODIUM CHLORIDE 0.9 % (FLUSH) 0.9 %
20 SYRINGE (ML) INJECTION AS NEEDED
Status: DISCONTINUED | OUTPATIENT
Start: 2020-08-20 | End: 2020-08-21 | Stop reason: HOSPADM

## 2020-08-20 RX ADMIN — MAGNESIUM SULFATE HEPTAHYDRATE 2 G: 40 INJECTION, SOLUTION INTRAVENOUS at 11:16

## 2020-08-20 RX ADMIN — HEPARIN 500 UNITS: 100 SYRINGE at 12:35

## 2020-08-20 RX ADMIN — Medication 10 ML: at 12:34

## 2020-08-20 RX ADMIN — SODIUM CHLORIDE 250 ML: 900 INJECTION, SOLUTION INTRAVENOUS at 10:09

## 2020-08-20 RX ADMIN — DEXAMETHASONE SODIUM PHOSPHATE 12 MG: 4 INJECTION, SOLUTION INTRA-ARTICULAR; INTRALESIONAL; INTRAMUSCULAR; INTRAVENOUS; SOFT TISSUE at 10:10

## 2020-08-20 RX ADMIN — CYANOCOBALAMIN 1000 MCG: 1000 INJECTION, SOLUTION INTRAMUSCULAR at 11:20

## 2020-08-20 RX ADMIN — SODIUM CHLORIDE 6.1 MG: 900 INJECTION, SOLUTION INTRAVENOUS at 10:35

## 2020-08-21 DIAGNOSIS — C56.1 MALIGNANT NEOPLASM OF RIGHT OVARY (HCC): ICD-10-CM

## 2020-08-21 RX ORDER — SODIUM CHLORIDE 9 MG/ML
250 INJECTION, SOLUTION INTRAVENOUS ONCE
Status: CANCELLED | OUTPATIENT
Start: 2020-09-11

## 2020-08-21 RX ORDER — SODIUM CHLORIDE 9 MG/ML
250 INJECTION, SOLUTION INTRAVENOUS ONCE
Status: CANCELLED | OUTPATIENT
Start: 2020-08-27

## 2020-08-25 ENCOUNTER — TELEPHONE (OUTPATIENT)
Dept: RADIATION ONCOLOGY | Facility: HOSPITAL | Age: 65
End: 2020-08-25

## 2020-08-25 DIAGNOSIS — G25.81 RESTLESS LEG SYNDROME: ICD-10-CM

## 2020-08-25 RX ORDER — TEMAZEPAM 30 MG/1
CAPSULE ORAL
Qty: 30 CAPSULE | Refills: 1 | Status: SHIPPED | OUTPATIENT
Start: 2020-08-25 | End: 2020-09-21

## 2020-08-25 NOTE — TELEPHONE ENCOUNTER
Spoke with Ms Zimmerman 8/13/2020. She states she wants to cx consult with Dr. Delong due to she wants to talk with her neurosurgeon first.

## 2020-08-27 ENCOUNTER — HOSPITAL ENCOUNTER (OUTPATIENT)
Dept: ONCOLOGY | Facility: HOSPITAL | Age: 65
Setting detail: INFUSION SERIES
Discharge: HOME | End: 2020-08-27

## 2020-08-27 ENCOUNTER — LAB (OUTPATIENT)
Dept: LAB | Facility: HOSPITAL | Age: 65
End: 2020-08-27

## 2020-08-27 VITALS
RESPIRATION RATE: 20 BRPM | HEIGHT: 65 IN | BODY MASS INDEX: 29.49 KG/M2 | WEIGHT: 177 LBS | OXYGEN SATURATION: 98 % | SYSTOLIC BLOOD PRESSURE: 130 MMHG | TEMPERATURE: 98.2 F | HEART RATE: 64 BPM | DIASTOLIC BLOOD PRESSURE: 74 MMHG

## 2020-08-27 DIAGNOSIS — E83.42 HYPOMAGNESEMIA: ICD-10-CM

## 2020-08-27 DIAGNOSIS — C56.1 MALIGNANT NEOPLASM OF RIGHT OVARY (HCC): Primary | ICD-10-CM

## 2020-08-27 DIAGNOSIS — Z79.01 LONG TERM (CURRENT) USE OF ANTICOAGULANTS: ICD-10-CM

## 2020-08-27 DIAGNOSIS — Z95.828 PORT-A-CATH IN PLACE: ICD-10-CM

## 2020-08-27 LAB
BASOPHILS # BLD AUTO: 0.01 10*3/MM3 (ref 0–0.2)
BASOPHILS NFR BLD AUTO: 0.3 % (ref 0–1.5)
DEPRECATED RDW RBC AUTO: 56.2 FL (ref 37–54)
EOSINOPHIL # BLD AUTO: 0.12 10*3/MM3 (ref 0–0.4)
EOSINOPHIL NFR BLD AUTO: 4 % (ref 0.3–6.2)
ERYTHROCYTE [DISTWIDTH] IN BLOOD BY AUTOMATED COUNT: 16 % (ref 12.3–15.4)
HCT VFR BLD AUTO: 32.1 % (ref 34–46.6)
HGB BLD-MCNC: 10.3 G/DL (ref 12–15.9)
INR PPP: 2.9
LYMPHOCYTES # BLD AUTO: 1.23 10*3/MM3 (ref 0.7–3.1)
LYMPHOCYTES NFR BLD AUTO: 41 % (ref 19.6–45.3)
MAGNESIUM SERPL-MCNC: 1.6 MG/DL (ref 1.6–2.4)
MCH RBC QN AUTO: 31.6 PG (ref 26.6–33)
MCHC RBC AUTO-ENTMCNC: 32.1 G/DL (ref 31.5–35.7)
MCV RBC AUTO: 98.5 FL (ref 79–97)
MONOCYTES # BLD AUTO: 0.31 10*3/MM3 (ref 0.1–0.9)
MONOCYTES NFR BLD AUTO: 10.3 % (ref 5–12)
NEUTROPHILS NFR BLD AUTO: 1.33 10*3/MM3 (ref 1.7–7)
NEUTROPHILS NFR BLD AUTO: 44.4 % (ref 42.7–76)
PLATELET # BLD AUTO: 139 10*3/MM3 (ref 140–450)
PMV BLD AUTO: 10.9 FL (ref 6–12)
RBC # BLD AUTO: 3.26 10*6/MM3 (ref 3.77–5.28)
WBC # BLD AUTO: 3 10*3/MM3 (ref 3.4–10.8)

## 2020-08-27 PROCEDURE — 96367 TX/PROPH/DG ADDL SEQ IV INF: CPT

## 2020-08-27 PROCEDURE — 85610 PROTHROMBIN TIME: CPT | Performed by: INTERNAL MEDICINE

## 2020-08-27 PROCEDURE — 85025 COMPLETE CBC W/AUTO DIFF WBC: CPT | Performed by: INTERNAL MEDICINE

## 2020-08-27 PROCEDURE — 96375 TX/PRO/DX INJ NEW DRUG ADDON: CPT

## 2020-08-27 PROCEDURE — 25010000002 MAGNESIUM SULFATE 2 GM/50ML SOLUTION: Performed by: INTERNAL MEDICINE

## 2020-08-27 PROCEDURE — 36591 DRAW BLOOD OFF VENOUS DEVICE: CPT

## 2020-08-27 PROCEDURE — 96413 CHEMO IV INFUSION 1 HR: CPT

## 2020-08-27 PROCEDURE — 25010000003 HEPARIN LOCK FLUSH PER 10 UNITS: Performed by: INTERNAL MEDICINE

## 2020-08-27 PROCEDURE — 25010000002 TOPOTECAN 4 MG/4ML SOLUTION 4 ML VIAL: Performed by: INTERNAL MEDICINE

## 2020-08-27 PROCEDURE — 25010000002 DEXAMETHASONE SODIUM PHOSPHATE 120 MG/30ML SOLUTION: Performed by: INTERNAL MEDICINE

## 2020-08-27 PROCEDURE — 83735 ASSAY OF MAGNESIUM: CPT | Performed by: INTERNAL MEDICINE

## 2020-08-27 RX ORDER — SODIUM CHLORIDE 0.9 % (FLUSH) 0.9 %
20 SYRINGE (ML) INJECTION AS NEEDED
Status: CANCELLED | OUTPATIENT
Start: 2020-08-27

## 2020-08-27 RX ORDER — SODIUM CHLORIDE 9 MG/ML
250 INJECTION, SOLUTION INTRAVENOUS ONCE
Status: DISCONTINUED | OUTPATIENT
Start: 2020-08-27 | End: 2020-08-28 | Stop reason: HOSPADM

## 2020-08-27 RX ORDER — SODIUM CHLORIDE 9 MG/ML
250 INJECTION, SOLUTION INTRAVENOUS ONCE
Status: COMPLETED | OUTPATIENT
Start: 2020-08-27 | End: 2020-08-27

## 2020-08-27 RX ORDER — HEPARIN SODIUM (PORCINE) LOCK FLUSH IV SOLN 100 UNIT/ML 100 UNIT/ML
500 SOLUTION INTRAVENOUS AS NEEDED
Status: CANCELLED | OUTPATIENT
Start: 2020-08-27

## 2020-08-27 RX ORDER — SODIUM CHLORIDE 0.9 % (FLUSH) 0.9 %
20 SYRINGE (ML) INJECTION AS NEEDED
Status: DISCONTINUED | OUTPATIENT
Start: 2020-08-27 | End: 2020-08-28 | Stop reason: HOSPADM

## 2020-08-27 RX ORDER — HEPARIN SODIUM (PORCINE) LOCK FLUSH IV SOLN 100 UNIT/ML 100 UNIT/ML
500 SOLUTION INTRAVENOUS AS NEEDED
Status: DISCONTINUED | OUTPATIENT
Start: 2020-08-27 | End: 2020-08-28 | Stop reason: HOSPADM

## 2020-08-27 RX ORDER — MAGNESIUM SULFATE HEPTAHYDRATE 40 MG/ML
2 INJECTION, SOLUTION INTRAVENOUS ONCE
Status: COMPLETED | OUTPATIENT
Start: 2020-08-27 | End: 2020-08-27

## 2020-08-27 RX ADMIN — DEXAMETHASONE SODIUM PHOSPHATE 12 MG: 4 INJECTION, SOLUTION INTRA-ARTICULAR; INTRALESIONAL; INTRAMUSCULAR; INTRAVENOUS; SOFT TISSUE at 09:09

## 2020-08-27 RX ADMIN — MAGNESIUM SULFATE HEPTAHYDRATE 2 G: 40 INJECTION, SOLUTION INTRAVENOUS at 10:52

## 2020-08-27 RX ADMIN — HEPARIN 500 UNITS: 100 SYRINGE at 11:59

## 2020-08-27 RX ADMIN — SODIUM CHLORIDE 6.1 MG: 900 INJECTION, SOLUTION INTRAVENOUS at 10:10

## 2020-08-27 RX ADMIN — Medication 10 ML: at 11:58

## 2020-08-27 RX ADMIN — SODIUM CHLORIDE 250 ML: 900 INJECTION, SOLUTION INTRAVENOUS at 09:09

## 2020-08-28 ENCOUNTER — HOSPITAL ENCOUNTER (OUTPATIENT)
Dept: ONCOLOGY | Facility: HOSPITAL | Age: 65
Setting detail: INFUSION SERIES
Discharge: HOME | End: 2020-08-28
Admitting: INTERNAL MEDICINE

## 2020-08-28 ENCOUNTER — LAB (OUTPATIENT)
Dept: LAB | Facility: HOSPITAL | Age: 65
End: 2020-08-28

## 2020-08-28 VITALS — TEMPERATURE: 98.6 F | SYSTOLIC BLOOD PRESSURE: 115 MMHG | HEART RATE: 50 BPM | DIASTOLIC BLOOD PRESSURE: 67 MMHG

## 2020-08-28 DIAGNOSIS — T45.1X5A ANTINEOPLASTIC CHEMOTHERAPY INDUCED ANEMIA: ICD-10-CM

## 2020-08-28 DIAGNOSIS — D64.81 ANTINEOPLASTIC CHEMOTHERAPY INDUCED ANEMIA: ICD-10-CM

## 2020-08-28 DIAGNOSIS — D61.810 PANCYTOPENIA DUE TO ANTINEOPLASTIC CHEMOTHERAPY (HCC): ICD-10-CM

## 2020-08-28 DIAGNOSIS — T45.1X5A PANCYTOPENIA DUE TO ANTINEOPLASTIC CHEMOTHERAPY (HCC): ICD-10-CM

## 2020-08-28 DIAGNOSIS — T45.1X5A ANTINEOPLASTIC CHEMOTHERAPY INDUCED ANEMIA: Primary | ICD-10-CM

## 2020-08-28 DIAGNOSIS — D64.81 ANTINEOPLASTIC CHEMOTHERAPY INDUCED ANEMIA: Primary | ICD-10-CM

## 2020-08-28 DIAGNOSIS — C56.1 MALIGNANT NEOPLASM OF RIGHT OVARY (HCC): ICD-10-CM

## 2020-08-28 LAB
BASOPHILS # BLD AUTO: 0 10*3/MM3 (ref 0–0.2)
BASOPHILS NFR BLD AUTO: 0 % (ref 0–1.5)
DEPRECATED RDW RBC AUTO: 53.9 FL (ref 37–54)
EOSINOPHIL # BLD AUTO: 0.01 10*3/MM3 (ref 0–0.4)
EOSINOPHIL NFR BLD AUTO: 0.2 % (ref 0.3–6.2)
ERYTHROCYTE [DISTWIDTH] IN BLOOD BY AUTOMATED COUNT: 15.7 % (ref 12.3–15.4)
HCT VFR BLD AUTO: 30.8 % (ref 34–46.6)
HGB BLD-MCNC: 9.7 G/DL (ref 12–15.9)
LYMPHOCYTES # BLD AUTO: 0.8 10*3/MM3 (ref 0.7–3.1)
LYMPHOCYTES NFR BLD AUTO: 15.7 % (ref 19.6–45.3)
MCH RBC QN AUTO: 30.8 PG (ref 26.6–33)
MCHC RBC AUTO-ENTMCNC: 31.5 G/DL (ref 31.5–35.7)
MCV RBC AUTO: 97.8 FL (ref 79–97)
MONOCYTES # BLD AUTO: 0.7 10*3/MM3 (ref 0.1–0.9)
MONOCYTES NFR BLD AUTO: 13.7 % (ref 5–12)
NEUTROPHILS NFR BLD AUTO: 3.6 10*3/MM3 (ref 1.7–7)
NEUTROPHILS NFR BLD AUTO: 70.4 % (ref 42.7–76)
PLATELET # BLD AUTO: 136 10*3/MM3 (ref 140–450)
PMV BLD AUTO: 10.3 FL (ref 6–12)
RBC # BLD AUTO: 3.15 10*6/MM3 (ref 3.77–5.28)
WBC # BLD AUTO: 5.11 10*3/MM3 (ref 3.4–10.8)

## 2020-08-28 PROCEDURE — 96372 THER/PROPH/DIAG INJ SC/IM: CPT

## 2020-08-28 PROCEDURE — 25010000002 EPOETIN ALFA-EPBX 40000 UNIT/ML SOLUTION: Performed by: INTERNAL MEDICINE

## 2020-08-28 PROCEDURE — 36415 COLL VENOUS BLD VENIPUNCTURE: CPT

## 2020-08-28 PROCEDURE — 25010000002 FILGRASTIM 480 MCG/0.8ML SOLUTION PREFILLED SYRINGE: Performed by: INTERNAL MEDICINE

## 2020-08-28 PROCEDURE — 85025 COMPLETE CBC W/AUTO DIFF WBC: CPT

## 2020-08-28 RX ORDER — ONDANSETRON 8 MG/1
8 TABLET, ORALLY DISINTEGRATING ORAL EVERY 8 HOURS PRN
Qty: 30 TABLET | Refills: 3 | Status: SHIPPED | OUTPATIENT
Start: 2020-08-28 | End: 2021-01-01

## 2020-08-28 RX ADMIN — EPOETIN ALFA-EPBX 40000 UNITS: 40000 INJECTION, SOLUTION INTRAVENOUS; SUBCUTANEOUS at 09:30

## 2020-08-28 RX ADMIN — FILGRASTIM 480 MCG: 480 INJECTION, SOLUTION INTRAVENOUS; SUBCUTANEOUS at 09:35

## 2020-08-31 ENCOUNTER — HOSPITAL ENCOUNTER (OUTPATIENT)
Dept: ONCOLOGY | Facility: HOSPITAL | Age: 65
Setting detail: INFUSION SERIES
Discharge: HOME | End: 2020-08-31

## 2020-08-31 ENCOUNTER — LAB (OUTPATIENT)
Dept: LAB | Facility: HOSPITAL | Age: 65
End: 2020-08-31

## 2020-08-31 DIAGNOSIS — T45.1X5A PANCYTOPENIA DUE TO ANTINEOPLASTIC CHEMOTHERAPY (HCC): ICD-10-CM

## 2020-08-31 DIAGNOSIS — D61.810 PANCYTOPENIA DUE TO ANTINEOPLASTIC CHEMOTHERAPY (HCC): ICD-10-CM

## 2020-08-31 DIAGNOSIS — T45.1X5A ANTINEOPLASTIC CHEMOTHERAPY INDUCED ANEMIA: ICD-10-CM

## 2020-08-31 DIAGNOSIS — D51.0 PERNICIOUS ANEMIA: ICD-10-CM

## 2020-08-31 DIAGNOSIS — C56.1 MALIGNANT NEOPLASM OF RIGHT OVARY (HCC): Primary | ICD-10-CM

## 2020-08-31 DIAGNOSIS — D64.81 ANTINEOPLASTIC CHEMOTHERAPY INDUCED ANEMIA: ICD-10-CM

## 2020-08-31 LAB
BASOPHILS # BLD AUTO: 0.01 10*3/MM3 (ref 0–0.2)
BASOPHILS NFR BLD AUTO: 0.2 % (ref 0–1.5)
DEPRECATED RDW RBC AUTO: 56.3 FL (ref 37–54)
EOSINOPHIL # BLD AUTO: 0.09 10*3/MM3 (ref 0–0.4)
EOSINOPHIL NFR BLD AUTO: 1.9 % (ref 0.3–6.2)
ERYTHROCYTE [DISTWIDTH] IN BLOOD BY AUTOMATED COUNT: 16.1 % (ref 12.3–15.4)
HCT VFR BLD AUTO: 31.5 % (ref 34–46.6)
HGB BLD-MCNC: 10.1 G/DL (ref 12–15.9)
LYMPHOCYTES # BLD AUTO: 1.46 10*3/MM3 (ref 0.7–3.1)
LYMPHOCYTES NFR BLD AUTO: 30.9 % (ref 19.6–45.3)
MCH RBC QN AUTO: 31.6 PG (ref 26.6–33)
MCHC RBC AUTO-ENTMCNC: 32.1 G/DL (ref 31.5–35.7)
MCV RBC AUTO: 98.4 FL (ref 79–97)
MONOCYTES # BLD AUTO: 0.17 10*3/MM3 (ref 0.1–0.9)
MONOCYTES NFR BLD AUTO: 3.6 % (ref 5–12)
NEUTROPHILS NFR BLD AUTO: 2.99 10*3/MM3 (ref 1.7–7)
NEUTROPHILS NFR BLD AUTO: 63.4 % (ref 42.7–76)
PLATELET # BLD AUTO: 98 10*3/MM3 (ref 140–450)
PMV BLD AUTO: 10.2 FL (ref 6–12)
RBC # BLD AUTO: 3.2 10*6/MM3 (ref 3.77–5.28)
WBC # BLD AUTO: 4.72 10*3/MM3 (ref 3.4–10.8)

## 2020-08-31 PROCEDURE — 36415 COLL VENOUS BLD VENIPUNCTURE: CPT

## 2020-08-31 PROCEDURE — 25010000002 FILGRASTIM 480 MCG/0.8ML SOLUTION PREFILLED SYRINGE: Performed by: NURSE PRACTITIONER

## 2020-08-31 PROCEDURE — 85025 COMPLETE CBC W/AUTO DIFF WBC: CPT

## 2020-08-31 RX ADMIN — FILGRASTIM 480 MCG: 480 INJECTION, SOLUTION INTRAVENOUS; SUBCUTANEOUS at 09:40

## 2020-09-01 ENCOUNTER — HOSPITAL ENCOUNTER (OUTPATIENT)
Dept: ONCOLOGY | Facility: HOSPITAL | Age: 65
Setting detail: INFUSION SERIES
Discharge: HOME | End: 2020-09-01

## 2020-09-01 VITALS — BODY MASS INDEX: 29.76 KG/M2 | HEIGHT: 65 IN | WEIGHT: 178.6 LBS

## 2020-09-01 DIAGNOSIS — C56.1 MALIGNANT NEOPLASM OF RIGHT OVARY (HCC): Primary | ICD-10-CM

## 2020-09-01 PROCEDURE — 96372 THER/PROPH/DIAG INJ SC/IM: CPT

## 2020-09-01 PROCEDURE — 25010000002 FILGRASTIM 480 MCG/0.8ML SOLUTION PREFILLED SYRINGE: Performed by: INTERNAL MEDICINE

## 2020-09-01 RX ADMIN — FILGRASTIM 480 MCG: 480 INJECTION, SOLUTION INTRAVENOUS; SUBCUTANEOUS at 10:22

## 2020-09-02 ENCOUNTER — APPOINTMENT (OUTPATIENT)
Dept: LAB | Facility: HOSPITAL | Age: 65
End: 2020-09-02

## 2020-09-03 ENCOUNTER — HOSPITAL ENCOUNTER (OUTPATIENT)
Dept: ONCOLOGY | Facility: HOSPITAL | Age: 65
Setting detail: INFUSION SERIES
Discharge: HOME | End: 2020-09-03

## 2020-09-04 ENCOUNTER — HOSPITAL ENCOUNTER (OUTPATIENT)
Dept: ONCOLOGY | Facility: HOSPITAL | Age: 65
Setting detail: INFUSION SERIES
Discharge: HOME | End: 2020-09-04

## 2020-09-04 VITALS
DIASTOLIC BLOOD PRESSURE: 79 MMHG | HEART RATE: 62 BPM | OXYGEN SATURATION: 100 % | SYSTOLIC BLOOD PRESSURE: 133 MMHG | TEMPERATURE: 98.4 F | HEIGHT: 65 IN | BODY MASS INDEX: 30.66 KG/M2 | WEIGHT: 184 LBS | RESPIRATION RATE: 16 BRPM

## 2020-09-04 DIAGNOSIS — E83.42 HYPOMAGNESEMIA: ICD-10-CM

## 2020-09-04 DIAGNOSIS — Z79.01 LONG TERM (CURRENT) USE OF ANTICOAGULANTS: ICD-10-CM

## 2020-09-04 DIAGNOSIS — C56.1 MALIGNANT NEOPLASM OF RIGHT OVARY (HCC): Primary | ICD-10-CM

## 2020-09-04 DIAGNOSIS — Z95.828 PORT-A-CATH IN PLACE: ICD-10-CM

## 2020-09-04 LAB
BASOPHILS # BLD AUTO: 0.01 10*3/MM3 (ref 0–0.2)
BASOPHILS NFR BLD AUTO: 0.2 % (ref 0–1.5)
DEPRECATED RDW RBC AUTO: 56.4 FL (ref 37–54)
EOSINOPHIL # BLD AUTO: 0.07 10*3/MM3 (ref 0–0.4)
EOSINOPHIL NFR BLD AUTO: 1.6 % (ref 0.3–6.2)
ERYTHROCYTE [DISTWIDTH] IN BLOOD BY AUTOMATED COUNT: 16.5 % (ref 12.3–15.4)
HCT VFR BLD AUTO: 29.9 % (ref 34–46.6)
HGB BLD-MCNC: 9.7 G/DL (ref 12–15.9)
LYMPHOCYTES # BLD AUTO: 1.33 10*3/MM3 (ref 0.7–3.1)
LYMPHOCYTES NFR BLD AUTO: 30.2 % (ref 19.6–45.3)
MAGNESIUM SERPL-MCNC: 1.4 MG/DL (ref 1.6–2.4)
MCH RBC QN AUTO: 31.6 PG (ref 26.6–33)
MCHC RBC AUTO-ENTMCNC: 32.4 G/DL (ref 31.5–35.7)
MCV RBC AUTO: 97.4 FL (ref 79–97)
MONOCYTES # BLD AUTO: 0.49 10*3/MM3 (ref 0.1–0.9)
MONOCYTES NFR BLD AUTO: 11.1 % (ref 5–12)
NEUTROPHILS NFR BLD AUTO: 2.5 10*3/MM3 (ref 1.7–7)
NEUTROPHILS NFR BLD AUTO: 56.9 % (ref 42.7–76)
PLATELET # BLD AUTO: 62 10*3/MM3 (ref 140–450)
PMV BLD AUTO: 11.9 FL (ref 6–12)
RBC # BLD AUTO: 3.07 10*6/MM3 (ref 3.77–5.28)
WBC # BLD AUTO: 4.4 10*3/MM3 (ref 3.4–10.8)

## 2020-09-04 PROCEDURE — 36591 DRAW BLOOD OFF VENOUS DEVICE: CPT

## 2020-09-04 PROCEDURE — 96365 THER/PROPH/DIAG IV INF INIT: CPT

## 2020-09-04 PROCEDURE — 25010000003 HEPARIN LOCK FLUSH PER 10 UNITS: Performed by: INTERNAL MEDICINE

## 2020-09-04 PROCEDURE — 85025 COMPLETE CBC W/AUTO DIFF WBC: CPT | Performed by: INTERNAL MEDICINE

## 2020-09-04 PROCEDURE — 83735 ASSAY OF MAGNESIUM: CPT | Performed by: INTERNAL MEDICINE

## 2020-09-04 PROCEDURE — 25010000002 MAGNESIUM SULFATE 2 GM/50ML SOLUTION: Performed by: INTERNAL MEDICINE

## 2020-09-04 RX ORDER — MAGNESIUM SULFATE HEPTAHYDRATE 40 MG/ML
2 INJECTION, SOLUTION INTRAVENOUS ONCE
Status: COMPLETED | OUTPATIENT
Start: 2020-09-04 | End: 2020-09-04

## 2020-09-04 RX ORDER — SODIUM CHLORIDE 0.9 % (FLUSH) 0.9 %
20 SYRINGE (ML) INJECTION AS NEEDED
Status: CANCELLED | OUTPATIENT
Start: 2020-09-04

## 2020-09-04 RX ORDER — SODIUM CHLORIDE 0.9 % (FLUSH) 0.9 %
20 SYRINGE (ML) INJECTION AS NEEDED
Status: DISCONTINUED | OUTPATIENT
Start: 2020-09-04 | End: 2020-09-05 | Stop reason: HOSPADM

## 2020-09-04 RX ORDER — WARFARIN SODIUM 5 MG/1
TABLET ORAL
Qty: 30 TABLET | Refills: 1 | Status: SHIPPED | OUTPATIENT
Start: 2020-09-04 | End: 2020-09-14 | Stop reason: SDUPTHER

## 2020-09-04 RX ORDER — HEPARIN SODIUM (PORCINE) LOCK FLUSH IV SOLN 100 UNIT/ML 100 UNIT/ML
500 SOLUTION INTRAVENOUS AS NEEDED
Status: DISCONTINUED | OUTPATIENT
Start: 2020-09-04 | End: 2020-09-05 | Stop reason: HOSPADM

## 2020-09-04 RX ORDER — SODIUM CHLORIDE 9 MG/ML
250 INJECTION, SOLUTION INTRAVENOUS ONCE
Status: COMPLETED | OUTPATIENT
Start: 2020-09-04 | End: 2020-09-04

## 2020-09-04 RX ORDER — HEPARIN SODIUM (PORCINE) LOCK FLUSH IV SOLN 100 UNIT/ML 100 UNIT/ML
500 SOLUTION INTRAVENOUS AS NEEDED
Status: CANCELLED | OUTPATIENT
Start: 2020-09-04

## 2020-09-04 RX ADMIN — HEPARIN 500 UNITS: 100 SYRINGE at 12:16

## 2020-09-04 RX ADMIN — Medication 20 ML: at 12:16

## 2020-09-04 RX ADMIN — SODIUM CHLORIDE 250 ML: 900 INJECTION, SOLUTION INTRAVENOUS at 10:58

## 2020-09-04 RX ADMIN — MAGNESIUM SULFATE HEPTAHYDRATE 2 G: 40 INJECTION, SOLUTION INTRAVENOUS at 10:58

## 2020-09-04 NOTE — PROGRESS NOTES
Patient came in for mag and Hycamtin. Patient platelets of 62, per Siobhan Staley NP hold for one week. Appointment made and patient verbalized understanding.

## 2020-09-11 ENCOUNTER — HOSPITAL ENCOUNTER (OUTPATIENT)
Dept: ONCOLOGY | Facility: HOSPITAL | Age: 65
Setting detail: INFUSION SERIES
Discharge: HOME | End: 2020-09-11

## 2020-09-11 VITALS
WEIGHT: 182 LBS | DIASTOLIC BLOOD PRESSURE: 72 MMHG | SYSTOLIC BLOOD PRESSURE: 114 MMHG | HEIGHT: 65 IN | HEART RATE: 62 BPM | TEMPERATURE: 97.5 F | RESPIRATION RATE: 18 BRPM | BODY MASS INDEX: 30.32 KG/M2

## 2020-09-11 DIAGNOSIS — C56.1 MALIGNANT NEOPLASM OF RIGHT OVARY (HCC): ICD-10-CM

## 2020-09-11 DIAGNOSIS — E83.42 HYPOMAGNESEMIA: Primary | ICD-10-CM

## 2020-09-11 DIAGNOSIS — D61.810 PANCYTOPENIA DUE TO ANTINEOPLASTIC CHEMOTHERAPY (HCC): ICD-10-CM

## 2020-09-11 DIAGNOSIS — T45.1X5A ANTINEOPLASTIC CHEMOTHERAPY INDUCED ANEMIA: ICD-10-CM

## 2020-09-11 DIAGNOSIS — Z95.828 PORT-A-CATH IN PLACE: ICD-10-CM

## 2020-09-11 DIAGNOSIS — T45.1X5A PANCYTOPENIA DUE TO ANTINEOPLASTIC CHEMOTHERAPY (HCC): ICD-10-CM

## 2020-09-11 DIAGNOSIS — D64.81 ANTINEOPLASTIC CHEMOTHERAPY INDUCED ANEMIA: ICD-10-CM

## 2020-09-11 LAB
BASOPHILS # BLD AUTO: 0.03 10*3/MM3 (ref 0–0.2)
BASOPHILS NFR BLD AUTO: 0.7 % (ref 0–1.5)
CANCER AG125 SERPL QL: 95.1 U/ML (ref 0–38.1)
DEPRECATED RDW RBC AUTO: 57.7 FL (ref 37–54)
EOSINOPHIL # BLD AUTO: 0.1 10*3/MM3 (ref 0–0.4)
EOSINOPHIL NFR BLD AUTO: 2.5 % (ref 0.3–6.2)
ERYTHROCYTE [DISTWIDTH] IN BLOOD BY AUTOMATED COUNT: 16.9 % (ref 12.3–15.4)
HCT VFR BLD AUTO: 31.3 % (ref 34–46.6)
HGB BLD-MCNC: 10 G/DL (ref 12–15.9)
LYMPHOCYTES # BLD AUTO: 1 10*3/MM3 (ref 0.7–3.1)
LYMPHOCYTES NFR BLD AUTO: 24.8 % (ref 19.6–45.3)
MAGNESIUM SERPL-MCNC: 1.8 MG/DL (ref 1.6–2.4)
MCH RBC QN AUTO: 31.2 PG (ref 26.6–33)
MCHC RBC AUTO-ENTMCNC: 31.9 G/DL (ref 31.5–35.7)
MCV RBC AUTO: 97.5 FL (ref 79–97)
MONOCYTES # BLD AUTO: 0.47 10*3/MM3 (ref 0.1–0.9)
MONOCYTES NFR BLD AUTO: 11.7 % (ref 5–12)
NEUTROPHILS NFR BLD AUTO: 2.43 10*3/MM3 (ref 1.7–7)
NEUTROPHILS NFR BLD AUTO: 60.3 % (ref 42.7–76)
PLATELET # BLD AUTO: 195 10*3/MM3 (ref 140–450)
PMV BLD AUTO: 10.5 FL (ref 6–12)
RBC # BLD AUTO: 3.21 10*6/MM3 (ref 3.77–5.28)
WBC # BLD AUTO: 4.03 10*3/MM3 (ref 3.4–10.8)

## 2020-09-11 PROCEDURE — 25010000003 HEPARIN LOCK FLUSH PER 10 UNITS: Performed by: INTERNAL MEDICINE

## 2020-09-11 PROCEDURE — 25010000002 MAGNESIUM SULFATE 2 GM/50ML SOLUTION: Performed by: INTERNAL MEDICINE

## 2020-09-11 PROCEDURE — 25010000002 DEXAMETHASONE SODIUM PHOSPHATE 120 MG/30ML SOLUTION: Performed by: INTERNAL MEDICINE

## 2020-09-11 PROCEDURE — 83735 ASSAY OF MAGNESIUM: CPT | Performed by: INTERNAL MEDICINE

## 2020-09-11 PROCEDURE — 86304 IMMUNOASSAY TUMOR CA 125: CPT | Performed by: INTERNAL MEDICINE

## 2020-09-11 PROCEDURE — 96413 CHEMO IV INFUSION 1 HR: CPT

## 2020-09-11 PROCEDURE — 85025 COMPLETE CBC W/AUTO DIFF WBC: CPT | Performed by: INTERNAL MEDICINE

## 2020-09-11 PROCEDURE — 36591 DRAW BLOOD OFF VENOUS DEVICE: CPT

## 2020-09-11 PROCEDURE — 25010000002 TOPOTECAN 4 MG/4ML SOLUTION 4 ML VIAL: Performed by: INTERNAL MEDICINE

## 2020-09-11 PROCEDURE — 96375 TX/PRO/DX INJ NEW DRUG ADDON: CPT

## 2020-09-11 PROCEDURE — 96367 TX/PROPH/DG ADDL SEQ IV INF: CPT

## 2020-09-11 RX ORDER — SODIUM CHLORIDE 9 MG/ML
250 INJECTION, SOLUTION INTRAVENOUS ONCE
Status: DISCONTINUED | OUTPATIENT
Start: 2020-09-11 | End: 2020-09-12 | Stop reason: HOSPADM

## 2020-09-11 RX ORDER — SODIUM CHLORIDE 0.9 % (FLUSH) 0.9 %
20 SYRINGE (ML) INJECTION AS NEEDED
Status: DISCONTINUED | OUTPATIENT
Start: 2020-09-11 | End: 2020-09-12 | Stop reason: HOSPADM

## 2020-09-11 RX ORDER — SODIUM CHLORIDE 0.9 % (FLUSH) 0.9 %
20 SYRINGE (ML) INJECTION AS NEEDED
Status: CANCELLED | OUTPATIENT
Start: 2020-09-11

## 2020-09-11 RX ORDER — MAGNESIUM SULFATE HEPTAHYDRATE 40 MG/ML
2 INJECTION, SOLUTION INTRAVENOUS ONCE
Status: COMPLETED | OUTPATIENT
Start: 2020-09-11 | End: 2020-09-11

## 2020-09-11 RX ORDER — HEPARIN SODIUM (PORCINE) LOCK FLUSH IV SOLN 100 UNIT/ML 100 UNIT/ML
500 SOLUTION INTRAVENOUS AS NEEDED
Status: DISCONTINUED | OUTPATIENT
Start: 2020-09-11 | End: 2020-09-12 | Stop reason: HOSPADM

## 2020-09-11 RX ORDER — SODIUM CHLORIDE 9 MG/ML
250 INJECTION, SOLUTION INTRAVENOUS ONCE
Status: COMPLETED | OUTPATIENT
Start: 2020-09-11 | End: 2020-09-11

## 2020-09-11 RX ORDER — HEPARIN SODIUM (PORCINE) LOCK FLUSH IV SOLN 100 UNIT/ML 100 UNIT/ML
500 SOLUTION INTRAVENOUS AS NEEDED
Status: CANCELLED | OUTPATIENT
Start: 2020-09-11

## 2020-09-11 RX ADMIN — HEPARIN 500 UNITS: 100 SYRINGE at 13:56

## 2020-09-11 RX ADMIN — Medication 20 ML: at 13:56

## 2020-09-11 RX ADMIN — SODIUM CHLORIDE 250 ML: 900 INJECTION, SOLUTION INTRAVENOUS at 11:57

## 2020-09-11 RX ADMIN — MAGNESIUM SULFATE HEPTAHYDRATE 2 G: 40 INJECTION, SOLUTION INTRAVENOUS at 11:57

## 2020-09-11 RX ADMIN — DEXAMETHASONE SODIUM PHOSPHATE 12 MG: 4 INJECTION, SOLUTION INTRA-ARTICULAR; INTRALESIONAL; INTRAMUSCULAR; INTRAVENOUS; SOFT TISSUE at 12:55

## 2020-09-11 RX ADMIN — SODIUM CHLORIDE 6.1 MG: 900 INJECTION, SOLUTION INTRAVENOUS at 13:15

## 2020-09-14 DIAGNOSIS — Z79.01 LONG TERM (CURRENT) USE OF ANTICOAGULANTS: ICD-10-CM

## 2020-09-14 RX ORDER — WARFARIN SODIUM 5 MG/1
5 TABLET ORAL DAILY
Qty: 30 TABLET | Refills: 1 | Status: SHIPPED | OUTPATIENT
Start: 2020-09-14 | End: 2020-09-30

## 2020-09-15 ENCOUNTER — TELEPHONE (OUTPATIENT)
Dept: ONCOLOGY | Facility: HOSPITAL | Age: 65
End: 2020-09-15

## 2020-09-15 NOTE — TELEPHONE ENCOUNTER
Called pt to let her know of her upcoming appointments that were scheduled. Left voice message asking pt to call back for appt dates at her convenience.

## 2020-09-18 ENCOUNTER — TELEPHONE (OUTPATIENT)
Dept: ONCOLOGY | Facility: CLINIC | Age: 65
End: 2020-09-18

## 2020-09-18 NOTE — TELEPHONE ENCOUNTER
Patient needs follow up appointment with Dr Ball.    She needs the appointment on a Monday, Wednesday, or Friday.    [phone]

## 2020-09-21 RX ORDER — TRAZODONE HYDROCHLORIDE 50 MG/1
50 TABLET ORAL NIGHTLY
Qty: 30 TABLET | Refills: 1 | Status: SHIPPED | OUTPATIENT
Start: 2020-09-21 | End: 2020-12-11 | Stop reason: ALTCHOICE

## 2020-09-22 ENCOUNTER — LAB (OUTPATIENT)
Dept: FAMILY MEDICINE CLINIC | Facility: CLINIC | Age: 65
End: 2020-09-22

## 2020-09-22 ENCOUNTER — OFFICE VISIT (OUTPATIENT)
Dept: FAMILY MEDICINE CLINIC | Facility: CLINIC | Age: 65
End: 2020-09-22

## 2020-09-22 VITALS
TEMPERATURE: 98.2 F | BODY MASS INDEX: 30.56 KG/M2 | HEART RATE: 69 BPM | OXYGEN SATURATION: 95 % | HEIGHT: 64 IN | DIASTOLIC BLOOD PRESSURE: 74 MMHG | WEIGHT: 179 LBS | SYSTOLIC BLOOD PRESSURE: 144 MMHG

## 2020-09-22 DIAGNOSIS — Z12.31 ENCOUNTER FOR SCREENING MAMMOGRAM FOR BREAST CANCER: ICD-10-CM

## 2020-09-22 DIAGNOSIS — Z13.1 SCREENING FOR DIABETES MELLITUS: ICD-10-CM

## 2020-09-22 DIAGNOSIS — Z11.59 ENCOUNTER FOR HEPATITIS C SCREENING TEST FOR LOW RISK PATIENT: ICD-10-CM

## 2020-09-22 DIAGNOSIS — Z78.0 POSTMENOPAUSAL: ICD-10-CM

## 2020-09-22 DIAGNOSIS — Z00.00 WELCOME TO MEDICARE PREVENTIVE VISIT: ICD-10-CM

## 2020-09-22 DIAGNOSIS — Z13.6 SCREENING, ISCHEMIC HEART DISEASE: ICD-10-CM

## 2020-09-22 DIAGNOSIS — Z00.00 WELCOME TO MEDICARE PREVENTIVE VISIT: Primary | ICD-10-CM

## 2020-09-22 DIAGNOSIS — Z23 NEED FOR IMMUNIZATION AGAINST INFLUENZA: ICD-10-CM

## 2020-09-22 LAB
ALBUMIN SERPL-MCNC: 4 G/DL (ref 3.5–5.2)
ALBUMIN/GLOB SERPL: 1.4 G/DL
ALP SERPL-CCNC: 139 U/L (ref 39–117)
ALT SERPL W P-5'-P-CCNC: 12 U/L (ref 1–33)
ANION GAP SERPL CALCULATED.3IONS-SCNC: 8.3 MMOL/L (ref 5–15)
AST SERPL-CCNC: 20 U/L (ref 1–32)
BILIRUB SERPL-MCNC: 0.2 MG/DL (ref 0–1.2)
BUN SERPL-MCNC: 16 MG/DL (ref 8–23)
BUN/CREAT SERPL: 19.8 (ref 7–25)
CALCIUM SPEC-SCNC: 9.6 MG/DL (ref 8.6–10.5)
CHLORIDE SERPL-SCNC: 106 MMOL/L (ref 98–107)
CHOLEST SERPL-MCNC: 194 MG/DL (ref 0–200)
CO2 SERPL-SCNC: 28.7 MMOL/L (ref 22–29)
CREAT SERPL-MCNC: 0.81 MG/DL (ref 0.57–1)
GFR SERPL CREATININE-BSD FRML MDRD: 71 ML/MIN/1.73
GLOBULIN UR ELPH-MCNC: 2.8 GM/DL
GLUCOSE SERPL-MCNC: 88 MG/DL (ref 65–99)
HCV AB SER DONR QL: NORMAL
HDLC SERPL-MCNC: 56 MG/DL (ref 40–60)
LDLC SERPL CALC-MCNC: 123 MG/DL (ref 0–100)
LDLC/HDLC SERPL: 2.2 {RATIO}
POTASSIUM SERPL-SCNC: 5 MMOL/L (ref 3.5–5.2)
PROT SERPL-MCNC: 6.8 G/DL (ref 6–8.5)
SODIUM SERPL-SCNC: 143 MMOL/L (ref 136–145)
TRIGL SERPL-MCNC: 74 MG/DL (ref 0–150)
VLDLC SERPL-MCNC: 14.8 MG/DL (ref 5–40)

## 2020-09-22 PROCEDURE — 80053 COMPREHEN METABOLIC PANEL: CPT | Performed by: FAMILY MEDICINE

## 2020-09-22 PROCEDURE — G0402 INITIAL PREVENTIVE EXAM: HCPCS | Performed by: FAMILY MEDICINE

## 2020-09-22 PROCEDURE — 80061 LIPID PANEL: CPT | Performed by: FAMILY MEDICINE

## 2020-09-22 PROCEDURE — G0008 ADMIN INFLUENZA VIRUS VAC: HCPCS | Performed by: FAMILY MEDICINE

## 2020-09-22 PROCEDURE — 90694 VACC AIIV4 NO PRSRV 0.5ML IM: CPT | Performed by: FAMILY MEDICINE

## 2020-09-22 PROCEDURE — G0403 EKG FOR INITIAL PREVENT EXAM: HCPCS | Performed by: FAMILY MEDICINE

## 2020-09-22 PROCEDURE — 36415 COLL VENOUS BLD VENIPUNCTURE: CPT | Performed by: FAMILY MEDICINE

## 2020-09-22 PROCEDURE — 86803 HEPATITIS C AB TEST: CPT | Performed by: FAMILY MEDICINE

## 2020-09-22 RX ORDER — TEMAZEPAM 30 MG/1
CAPSULE ORAL
COMMUNITY
Start: 2020-08-25 | End: 2020-10-19

## 2020-09-22 NOTE — PROGRESS NOTES
Initial Medicare Wellness Visit   The ABC's of the Annual Wellness Visit    Chief Complaint   Patient presents with   • Medicare Wellness-Initial Visit     fasting labs   • Immunizations     HD Flu       HPI:  Tahira Zimmerman, -1955, is a 65 y.o. female who presents for an Initial Medicare Wellness Visit.    Recent Hospitalizations:  No hospitalization(s) within the last year..    Current Medical Providers:  Patient Care Team:  Noemy Queen MD as PCP - General (Family Medicine)  Daryn Tay MD as Consulting Physician (Hematology and Oncology)    Health Habits and Functional and Cognitive Screening and Depression Screening:  Functional & Cognitive Status 2020   Do you have difficulty preparing food and eating? No   Do you have difficulty bathing yourself, getting dressed or grooming yourself? No   Do you have difficulty using the toilet? No   Do you have difficulty moving around from place to place? No   Do you have trouble with steps or getting out of a bed or a chair? No   Current Diet Well Balanced Diet   Dental Exam Not up to date   Eye Exam Not up to date   Exercise (times per week) 2 times per week   Current Exercise Activities Include Housecleaning   Do you need help using the phone?  No   Are you deaf or do you have serious difficulty hearing?  No   Do you need help with transportation? No   Do you need help shopping? No   Do you need help preparing meals?  No   Do you need help with housework?  No   Do you need help with laundry? No   Do you need help taking your medications? No   Do you ever drive or ride in a car without wearing a seat belt? No   Have you felt unusual stress, anger or loneliness in the last month? No   Who do you live with? Child   If you need help, do you have trouble finding someone available to you? No   Do you have difficulty concentrating, remembering or making decisions? Yes       Compared to one year ago, the patient feels her physical health is the  same and her mental health is the same.    Depression Screen:  PHQ-2/PHQ-9 Depression Screening 9/22/2020   Little interest or pleasure in doing things 0   Feeling down, depressed, or hopeless 0   Trouble falling or staying asleep, or sleeping too much -   Feeling tired or having little energy -   Poor appetite or overeating -   Feeling bad about yourself - or that you are a failure or have let yourself or your family down -   Trouble concentrating on things, such as reading the newspaper or watching television -   Moving or speaking so slowly that other people could have noticed. Or the opposite - being so fidgety or restless that you have been moving around a lot more than usual -   Thoughts that you would be better off dead, or of hurting yourself in some way -   Total Score 0   If you checked off any problems, how difficult have these problems made it for you to do your work, take care of things at home, or get along with other people? -         Past Medical/Family/Social History:  The following portions of the patient's history were reviewed and updated as appropriate: allergies, current medications, past family history, past medical history, past social history, past surgical history and problem list.    Allergies   Allergen Reactions   • Morphine Nausea Only and Hives   • Penicillin V Potassium Rash   • Sulfa Antibiotics Rash   • Levaquin [Levofloxacin] Nausea Only and Dizziness     When taken with Coumadin   • Amoxicillin Rash   • Norco [Hydrocodone-Acetaminophen] Rash         Current Outpatient Medications:   •  temazepam (RESTORIL) 30 MG capsule, TAKE 1 CAPSULE BY MOUTH AT NIGHT AS NEEDED FOR SLEEP., Disp: , Rfl:   •  acetaminophen (TYLENOL) 500 MG tablet, Take 1,000 mg by mouth Every 6 (Six) Hours As Needed for Mild Pain ., Disp: , Rfl:   •  atorvastatin (LIPITOR) 20 MG tablet, Take 20 mg by mouth every night at bedtime., Disp: , Rfl: 3  •  cyanocobalamin 1000 MCG/ML injection, Inject 1,000 mcg into the  "appropriate muscle as directed by prescriber Every 28 (Twenty-Eight) Days., Disp: , Rfl:   •  famotidine (PEPCID) 40 MG tablet, TAKE 1 TABLET BY MOUTH EVERY MORNING BEFORE BREAKFAST, Disp: 90 tablet, Rfl: 1  •  KLOR-CON 20 MEQ CR tablet, Take 3 tablets by mouth every morning and take 2 tablets by mouth at bedtime., Disp: 150 tablet, Rfl: 5  •  loperamide (Imodium A-D) 2 MG tablet, Take 1 tablet by mouth 3 (Three) Times a Day As Needed for Diarrhea., Disp: 60 tablet, Rfl: 2  •  magnesium oxide (MAG-OX) 400 MG tablet, TAKE 1 TABLET BY MOUTH TWICE A DAY, Disp: 180 tablet, Rfl: 1  •  ondansetron ODT (ZOFRAN-ODT) 8 MG disintegrating tablet, Place 1 tablet under the tongue Every 8 (Eight) Hours As Needed for Nausea or Vomiting., Disp: 30 tablet, Rfl: 3  •  oxyCODONE (ROXICODONE) 10 MG tablet, Take 1 tablet by mouth 3 (Three) Times a Day As Needed for Moderate Pain ., Disp: 90 tablet, Rfl: 0  •  pregabalin (LYRICA) 100 MG capsule, Take 1 capsule by mouth 3 (Three) Times a Day., Disp: 90 capsule, Rfl: 2  •  sertraline (ZOLOFT) 50 MG tablet, TAKE 1 TABLET BY MOUTH EVERY EVENING BEFORE BED, Disp: 90 tablet, Rfl: 1  •  Syringe 23G X 1\" 3 ML misc, INJECT 1 ML B-12 ONCE MONTHLY, Disp: 1 each, Rfl: 3  •  traZODone (DESYREL) 50 MG tablet, Take 1 tablet by mouth Every Night., Disp: 30 tablet, Rfl: 1  •  triamterene-hydrochlorothiazide (DYAZIDE) 37.5-25 MG per capsule, TAKE 1 CAPSULE BY MOUTH EVERY DAY, Disp: 90 capsule, Rfl: 1  •  warfarin (COUMADIN) 1 MG tablet, Take 1 tablet by mouth Daily., Disp: 45 tablet, Rfl: 2  •  warfarin (COUMADIN) 5 MG tablet, Take 1 tablet by mouth Daily. Active thrombosis  Indications: DVT/PE (active thrombosis), Disp: 30 tablet, Rfl: 1    Aspirin use counseling: Does not need ASA (and currently is not on it)    Current medication list contains no high risk medications.  No harmful drug interactions have been identified.     Family History   Problem Relation Age of Onset   • Hypertension Mother    • " Cancer Father    • Hypertension Father    • Hypertension Sister    • Hypertension Brother    • Stroke Brother        Social History     Tobacco Use   • Smoking status: Never Smoker   • Smokeless tobacco: Never Used   Substance Use Topics   • Alcohol use: No     Frequency: Never     Comment: caffeine 32-64oz qd       Past Surgical History:   Procedure Laterality Date   • COLON SURGERY     • COLONOSCOPY  05/26/2018   • EXPLORATORY LAPAROTOMY     • HERNIA REPAIR     • HYSTERECTOMY     • OOPHORECTOMY         Patient Active Problem List   Diagnosis   • Malignant neoplasm of right ovary (CMS/HCC)   • Hypomagnesemia   • Left upper extremity deep vein thrombosis (CMS/HCC)   • Pernicious anemia   • Malabsorption due to intolerance, not elsewhere classified   • MELANI (iron deficiency anemia)   • Pancytopenia due to antineoplastic chemotherapy (CMS/HCC)   • Encounter for antineoplastic chemotherapy   • Long term (current) use of anticoagulants   • Monitoring for long-term anticoagulant use   • Leg pain, bilateral   • Osteoarthritis of cervical spine without myelopathy   • Other long term (current) drug therapy   • Pain   • Hyperlipidemia   • Essential hypertension   • Antineoplastic chemotherapy induced anemia   • Cervical disc disorder with radiculopathy   • Restless leg syndrome   • Dysuria   • Fibromyositis   • Port-A-Cath in place   • Encounter for medication management   • Chronic headaches   • Welcome to Medicare preventive visit   • Encounter for screening mammogram for breast cancer   • Postmenopausal   • Screening for diabetes mellitus   • Screening, ischemic heart disease   • Encounter for hepatitis C screening test for low risk patient   • Need for immunization against influenza       Review of Systems   Constitutional: Negative for chills and fever.   HENT: Negative for sinus pressure, sore throat and swollen glands.    Eyes: Negative for blurred vision.   Respiratory: Negative for cough, shortness of breath and  "wheezing.    Cardiovascular: Negative for chest pain and palpitations.   Gastrointestinal: Negative for abdominal pain.   Endocrine: Negative for polyuria.   Genitourinary: Negative for difficulty urinating.   Skin: Negative for rash.   Neurological: Negative for dizziness, seizures and headache.   Hematological: Negative for adenopathy.   Psychiatric/Behavioral: Negative for depressed mood.       Objective     Vitals:    09/22/20 1107   BP: 144/74   BP Location: Left arm   Patient Position: Sitting   Cuff Size: Adult   Pulse: 69   Temp: 98.2 °F (36.8 °C)   SpO2: 95%   Weight: 81.2 kg (179 lb)   Height: 161.3 cm (63.5\")       Patient's Body mass index is 31.21 kg/m². BMI is above normal parameters. Recommendations include: exercise counseling.      No exam data present    The patient has no evidence of cognitve impairment.     Physical Exam  Constitutional:       General: She is not in acute distress.     Appearance: She is well-developed.   HENT:      Head: Normocephalic.   Eyes:      General: Lids are normal.      Conjunctiva/sclera: Conjunctivae normal.   Neck:      Musculoskeletal: Normal range of motion.      Thyroid: No thyroid mass or thyromegaly.      Trachea: Trachea normal.   Cardiovascular:      Rate and Rhythm: Normal rate and regular rhythm.      Heart sounds: Normal heart sounds.   Pulmonary:      Effort: Pulmonary effort is normal.      Breath sounds: Normal breath sounds.   Abdominal:      Palpations: Abdomen is soft.   Lymphadenopathy:      Cervical: No cervical adenopathy.   Skin:     General: Skin is warm and dry.   Neurological:      Mental Status: She is alert and oriented to person, place, and time.   Psychiatric:         Attention and Perception: She is attentive.         Mood and Affect: Mood normal.         Speech: Speech normal.         Behavior: Behavior normal.         Recent Lab Results:  Lab Results   Component Value Date    GLU 86 07/05/2020     Lab Results   Component Value Date    " CHOL 222 (H) 08/26/2019    TRIG 92 08/26/2019    HDL 58 08/26/2019    VLDL 18.4 08/26/2019    LDLHDL 2.51 08/26/2019     No visits with results within 1 Week(s) from this visit.   Latest known visit with results is:   Hospital Outpatient Visit on 09/11/2020   Component Date Value Ref Range Status   • Magnesium 09/11/2020 1.8  1.6 - 2.4 mg/dL Final   • WBC 09/11/2020 4.03  3.40 - 10.80 10*3/mm3 Final   • RBC 09/11/2020 3.21* 3.77 - 5.28 10*6/mm3 Final   • Hemoglobin 09/11/2020 10.0* 12.0 - 15.9 g/dL Final   • Hematocrit 09/11/2020 31.3* 34.0 - 46.6 % Final   • MCV 09/11/2020 97.5* 79.0 - 97.0 fL Final   • MCH 09/11/2020 31.2  26.6 - 33.0 pg Final   • MCHC 09/11/2020 31.9  31.5 - 35.7 g/dL Final   • RDW 09/11/2020 16.9* 12.3 - 15.4 % Final   • RDW-SD 09/11/2020 57.7* 37.0 - 54.0 fl Final   • MPV 09/11/2020 10.5  6.0 - 12.0 fL Final   • Platelets 09/11/2020 195  140 - 450 10*3/mm3 Final   • Neutrophil % 09/11/2020 60.3  42.7 - 76.0 % Final   • Lymphocyte % 09/11/2020 24.8  19.6 - 45.3 % Final   • Monocyte % 09/11/2020 11.7  5.0 - 12.0 % Final   • Eosinophil % 09/11/2020 2.5  0.3 - 6.2 % Final   • Basophil % 09/11/2020 0.7  0.0 - 1.5 % Final   • Neutrophils, Absolute 09/11/2020 2.43  1.70 - 7.00 10*3/mm3 Final   • Lymphocytes, Absolute 09/11/2020 1.00  0.70 - 3.10 10*3/mm3 Final   • Monocytes, Absolute 09/11/2020 0.47  0.10 - 0.90 10*3/mm3 Final   • Eosinophils, Absolute 09/11/2020 0.10  0.00 - 0.40 10*3/mm3 Final   • Basophils, Absolute 09/11/2020 0.03  0.00 - 0.20 10*3/mm3 Final   •  09/11/2020 95.1* 0.0 - 38.1 U/mL Final       ECG 12 Lead    Date/Time: 9/22/2020 11:15 AM  Performed by: Noemy Queen MD  Authorized by: Noemy Queen MD   Comparison: compared with previous ECG from 8/30/2019  Similar to previous ECG  Rhythm: sinus bradycardia  Rate: bradycardic  BPM: 54  Conduction: conduction normal  ST Segments: ST segments normal  QRS axis: normal  Other: no other findings    Clinical impression:  non-specific ECG            Assessment/Plan   Age-appropriate Screening Schedule:  Refer to the list below for future screening recommendations based on patient's age, sex and/or medical conditions.      Health Maintenance   Topic Date Due   • TDAP/TD VACCINES (1 - Tdap) 07/12/1974   • ZOSTER VACCINE (1 of 2) 07/12/2005   • PAP SMEAR  08/28/2017   • DXA SCAN  06/27/2019   • INFLUENZA VACCINE  08/01/2020   • LIPID PANEL  08/26/2020   • MAMMOGRAM  04/15/2021   • COLONOSCOPY  05/25/2028       Medicare Risks and Personalized Health Plan:  Advance Directive Discussion  Breast Cancer/Mammogram Screening  Diabetic Lab Screening   Immunizations Discussed/Encouraged (specific immunizations; Influenza and Prevnar )  Osteoprorosis Risk      CMS-Preventive Services Quick Reference  Medicare Preventive Services Addressed:  Annual Wellness Visit (AWV)  Bone Density Measurements  Diabetes Screening-Lab Order for either glucose quantitative blood (except reagent strip), glucose;post glucose dose(includes glucose), or glucose tolerance test-3 specimens(includes glucose)  Initial Preventative Physical Examination (IPPE)  Screening Mammography     Advance Care Planning:  ACP discussion was held with the patient during this visit. Patient does not have an advance directive, declines further assistance.    Diagnoses and all orders for this visit:    1. Welcome to Medicare preventive visit (Primary)  -     Mammo Screening Digital Tomosynthesis Bilateral With CAD; Future  -     DEXA Bone Density Axial  -     Lipid Panel  -     Comprehensive Metabolic Panel  -     Hepatitis C antibody; Future    2. Encounter for screening mammogram for breast cancer  -     Mammo Screening Digital Tomosynthesis Bilateral With CAD; Future    3. Postmenopausal  -     DEXA Bone Density Axial    4. Screening for diabetes mellitus  -     Comprehensive Metabolic Panel    5. Screening, ischemic heart disease  -     Lipid Panel    6. Encounter for hepatitis C  screening test for low risk patient  -     Hepatitis C antibody; Future    7. Need for immunization against influenza  -     Fluad Quad 65+ yrs ([id number])        An After Visit Summary and PPPS with all of these plans were given to the patient.      Follow Up:  Return in about 1 year (around 9/22/2021) for Medicare Wellness.

## 2020-09-22 NOTE — PATIENT INSTRUCTIONS
Medicare Wellness  Personal Prevention Plan of Service     Date of Office Visit:  2020  Encounter Provider:  Noemy Queen MD  Place of Service:  [facility]  Patient Name: Tahira Zimmerman  :  1955    As part of the Medicare Wellness portion of your visit today, we are providing you with this personalized preventive plan of services (PPPS). This plan is based upon recommendations of the [place] Preventive Services Task Force (USPSTF) and the Advisory Committee on Immunization Practices (ACIP).    This lists the preventive care services that should be considered, and provides dates of when you are due. Items listed as completed are up-to-date and do not require any further intervention.    Health Maintenance   Topic Date Due   • TDAP/TD VACCINES (1 - Tdap) 1974   • ZOSTER VACCINE (1 of 2) 2005   • HEPATITIS C SCREENING  2017   • PAP SMEAR  2017   • DXA SCAN  2019   • Pneumococcal Vaccine Once at 65 Years Old  2020   • BH AMB Pneumococcal Vaccine 65+ (1 of 1 - PPSV23) 2020   • INFLUENZA VACCINE  2020   • LIPID PANEL  2020   • MAMMOGRAM  04/15/2021   • MEDICARE ANNUAL WELLNESS  2021   • COLONOSCOPY  2028       Orders Placed This Encounter   Procedures   • Mammo Screening Digital Tomosynthesis Bilateral With CAD     Standing Status:   Future     Standing Expiration Date:   2021     Scheduling Instructions:      [facility]     Order Specific Question:   Reason for Exam:     Answer:   screening for breast cancer   • DEXA Bone Density Axial     Order Specific Question:   Reason for Exam:     Answer:   screening for osteoporosis   • Lipid Panel   • Comprehensive Metabolic Panel   • Hepatitis C antibody     Standing Status:   Future     Standing Expiration Date:   2021       Return in about 1 year (around 2021) for Medicare Wellness.

## 2020-09-24 NOTE — TELEPHONE ENCOUNTER
Patient call stated when covid 19 test done cause she had be around family member that do not know they had it. Stated that she started feeling bad yesterday went got tested will call back for appointment.nr

## 2020-09-30 ENCOUNTER — APPOINTMENT (OUTPATIENT)
Dept: CT IMAGING | Facility: HOSPITAL | Age: 65
End: 2020-09-30

## 2020-09-30 ENCOUNTER — HOSPITAL ENCOUNTER (OUTPATIENT)
Facility: HOSPITAL | Age: 65
Setting detail: OBSERVATION
Discharge: HOME | End: 2020-10-02
Attending: INTERNAL MEDICINE | Admitting: HOSPITALIST

## 2020-09-30 ENCOUNTER — APPOINTMENT (OUTPATIENT)
Dept: GENERAL RADIOLOGY | Facility: HOSPITAL | Age: 65
End: 2020-09-30

## 2020-09-30 DIAGNOSIS — R68.84 JAW PAIN: ICD-10-CM

## 2020-09-30 DIAGNOSIS — T45.511A POISONING BY WARFARIN SODIUM, ACCIDENTAL OR UNINTENTIONAL, INITIAL ENCOUNTER: Primary | ICD-10-CM

## 2020-09-30 LAB
ABO GROUP BLD: NORMAL
ALBUMIN SERPL-MCNC: 3.9 G/DL (ref 3.5–5.2)
ALBUMIN/GLOB SERPL: 1.4 G/DL
ALP SERPL-CCNC: 150 U/L (ref 39–117)
ALT SERPL W P-5'-P-CCNC: 11 U/L (ref 1–33)
ANION GAP SERPL CALCULATED.3IONS-SCNC: 10 MMOL/L (ref 5–15)
AST SERPL-CCNC: 20 U/L (ref 1–32)
BASOPHILS # BLD AUTO: 0 10*3/MM3 (ref 0–0.2)
BASOPHILS NFR BLD AUTO: 0.8 % (ref 0–1.5)
BILIRUB SERPL-MCNC: 0.2 MG/DL (ref 0–1.2)
BLD GP AB SCN SERPL QL: NEGATIVE
BUN SERPL-MCNC: 17 MG/DL (ref 8–23)
BUN SERPL-MCNC: ABNORMAL MG/DL
BUN/CREAT SERPL: ABNORMAL
CALCIUM SPEC-SCNC: 9.5 MG/DL (ref 8.6–10.5)
CHLORIDE SERPL-SCNC: 103 MMOL/L (ref 98–107)
CO2 SERPL-SCNC: 26 MMOL/L (ref 22–29)
CREAT SERPL-MCNC: 1.02 MG/DL (ref 0.57–1)
DEPRECATED RDW RBC AUTO: 59.1 FL (ref 37–54)
EOSINOPHIL # BLD AUTO: 0.2 10*3/MM3 (ref 0–0.4)
EOSINOPHIL NFR BLD AUTO: 3.6 % (ref 0.3–6.2)
ERYTHROCYTE [DISTWIDTH] IN BLOOD BY AUTOMATED COUNT: 18.1 % (ref 12.3–15.4)
GFR SERPL CREATININE-BSD FRML MDRD: 54 ML/MIN/1.73
GLOBULIN UR ELPH-MCNC: 2.8 GM/DL
GLUCOSE SERPL-MCNC: 152 MG/DL (ref 65–99)
HCT VFR BLD AUTO: 33.2 % (ref 34–46.6)
HCT VFR BLD AUTO: 33.5 % (ref 34–46.6)
HGB BLD-MCNC: 10.6 G/DL (ref 12–15.9)
HGB BLD-MCNC: 10.8 G/DL (ref 12–15.9)
INR PPP: 7.51 (ref 2–3)
LYMPHOCYTES # BLD AUTO: 0.9 10*3/MM3 (ref 0.7–3.1)
LYMPHOCYTES NFR BLD AUTO: 19.2 % (ref 19.6–45.3)
MCH RBC QN AUTO: 30 PG (ref 26.6–33)
MCHC RBC AUTO-ENTMCNC: 32.1 G/DL (ref 31.5–35.7)
MCV RBC AUTO: 93.3 FL (ref 79–97)
MONOCYTES # BLD AUTO: 0.4 10*3/MM3 (ref 0.1–0.9)
MONOCYTES NFR BLD AUTO: 8 % (ref 5–12)
NEUTROPHILS NFR BLD AUTO: 3.2 10*3/MM3 (ref 1.7–7)
NEUTROPHILS NFR BLD AUTO: 68.4 % (ref 42.7–76)
NRBC BLD AUTO-RTO: 0.2 /100 WBC (ref 0–0.2)
PLATELET # BLD AUTO: 224 10*3/MM3 (ref 140–450)
PMV BLD AUTO: 8.5 FL (ref 6–12)
POTASSIUM SERPL-SCNC: 4 MMOL/L (ref 3.5–5.2)
PROT SERPL-MCNC: 6.7 G/DL (ref 6–8.5)
PROTHROMBIN TIME: 75 SECONDS (ref 19.4–28.5)
RBC # BLD AUTO: 3.6 10*6/MM3 (ref 3.77–5.28)
RH BLD: POSITIVE
SODIUM SERPL-SCNC: 139 MMOL/L (ref 136–145)
T&S EXPIRATION DATE: NORMAL
TROPONIN T SERPL-MCNC: <0.01 NG/ML (ref 0–0.03)
WBC # BLD AUTO: 4.7 10*3/MM3 (ref 3.4–10.8)

## 2020-09-30 PROCEDURE — 80053 COMPREHEN METABOLIC PANEL: CPT | Performed by: NURSE PRACTITIONER

## 2020-09-30 PROCEDURE — 99284 EMERGENCY DEPT VISIT MOD MDM: CPT

## 2020-09-30 PROCEDURE — 71045 X-RAY EXAM CHEST 1 VIEW: CPT

## 2020-09-30 PROCEDURE — 25010000002 CYANOCOBALAMIN PER 1000 MCG: Performed by: PHYSICIAN ASSISTANT

## 2020-09-30 PROCEDURE — 86850 RBC ANTIBODY SCREEN: CPT | Performed by: PHYSICIAN ASSISTANT

## 2020-09-30 PROCEDURE — 70450 CT HEAD/BRAIN W/O DYE: CPT

## 2020-09-30 PROCEDURE — 93005 ELECTROCARDIOGRAM TRACING: CPT | Performed by: NURSE PRACTITIONER

## 2020-09-30 PROCEDURE — 84484 ASSAY OF TROPONIN QUANT: CPT | Performed by: PHYSICIAN ASSISTANT

## 2020-09-30 PROCEDURE — 85025 COMPLETE CBC W/AUTO DIFF WBC: CPT | Performed by: NURSE PRACTITIONER

## 2020-09-30 PROCEDURE — 96365 THER/PROPH/DIAG IV INF INIT: CPT

## 2020-09-30 PROCEDURE — G0378 HOSPITAL OBSERVATION PER HR: HCPCS

## 2020-09-30 PROCEDURE — 84484 ASSAY OF TROPONIN QUANT: CPT | Performed by: NURSE PRACTITIONER

## 2020-09-30 PROCEDURE — 86900 BLOOD TYPING SEROLOGIC ABO: CPT | Performed by: PHYSICIAN ASSISTANT

## 2020-09-30 PROCEDURE — 85018 HEMOGLOBIN: CPT | Performed by: PHYSICIAN ASSISTANT

## 2020-09-30 PROCEDURE — 86901 BLOOD TYPING SEROLOGIC RH(D): CPT | Performed by: PHYSICIAN ASSISTANT

## 2020-09-30 PROCEDURE — 96372 THER/PROPH/DIAG INJ SC/IM: CPT

## 2020-09-30 PROCEDURE — 25010000002 DEXAMETHASONE PER 1 MG: Performed by: PHYSICIAN ASSISTANT

## 2020-09-30 PROCEDURE — 86900 BLOOD TYPING SEROLOGIC ABO: CPT

## 2020-09-30 PROCEDURE — 96375 TX/PRO/DX INJ NEW DRUG ADDON: CPT

## 2020-09-30 PROCEDURE — 25010000002 VITAMIN K1 PER 1 MG: Performed by: NURSE PRACTITIONER

## 2020-09-30 PROCEDURE — 85610 PROTHROMBIN TIME: CPT | Performed by: NURSE PRACTITIONER

## 2020-09-30 PROCEDURE — 86901 BLOOD TYPING SEROLOGIC RH(D): CPT

## 2020-09-30 PROCEDURE — 99219 PR INITIAL OBSERVATION CARE/DAY 50 MINUTES: CPT | Performed by: PHYSICIAN ASSISTANT

## 2020-09-30 PROCEDURE — 85014 HEMATOCRIT: CPT | Performed by: PHYSICIAN ASSISTANT

## 2020-09-30 RX ORDER — ATORVASTATIN CALCIUM 20 MG/1
20 TABLET, FILM COATED ORAL NIGHTLY
Status: DISCONTINUED | OUTPATIENT
Start: 2020-09-30 | End: 2020-10-02 | Stop reason: HOSPADM

## 2020-09-30 RX ORDER — TEMAZEPAM 15 MG/1
30 CAPSULE ORAL NIGHTLY PRN
Status: DISCONTINUED | OUTPATIENT
Start: 2020-09-30 | End: 2020-10-02 | Stop reason: HOSPADM

## 2020-09-30 RX ORDER — POTASSIUM CHLORIDE 20 MEQ/1
40 TABLET, EXTENDED RELEASE ORAL AS NEEDED
Status: DISCONTINUED | OUTPATIENT
Start: 2020-09-30 | End: 2020-10-02 | Stop reason: HOSPADM

## 2020-09-30 RX ORDER — PANTOPRAZOLE SODIUM 40 MG/10ML
40 INJECTION, POWDER, LYOPHILIZED, FOR SOLUTION INTRAVENOUS
Status: DISCONTINUED | OUTPATIENT
Start: 2020-10-01 | End: 2020-10-02 | Stop reason: HOSPADM

## 2020-09-30 RX ORDER — DEXAMETHASONE SODIUM PHOSPHATE 4 MG/ML
6 INJECTION, SOLUTION INTRA-ARTICULAR; INTRALESIONAL; INTRAMUSCULAR; INTRAVENOUS; SOFT TISSUE 2 TIMES DAILY
Status: DISCONTINUED | OUTPATIENT
Start: 2020-09-30 | End: 2020-10-02 | Stop reason: HOSPADM

## 2020-09-30 RX ORDER — TRIAMTERENE AND HYDROCHLOROTHIAZIDE 37.5; 25 MG/1; MG/1
1 TABLET ORAL DAILY
Status: DISCONTINUED | OUTPATIENT
Start: 2020-10-01 | End: 2020-10-02 | Stop reason: HOSPADM

## 2020-09-30 RX ORDER — ACETAMINOPHEN 325 MG/1
650 TABLET ORAL EVERY 4 HOURS PRN
Status: DISCONTINUED | OUTPATIENT
Start: 2020-09-30 | End: 2020-10-02 | Stop reason: HOSPADM

## 2020-09-30 RX ORDER — ONDANSETRON 4 MG/1
4 TABLET, FILM COATED ORAL EVERY 6 HOURS PRN
Status: DISCONTINUED | OUTPATIENT
Start: 2020-09-30 | End: 2020-10-02 | Stop reason: HOSPADM

## 2020-09-30 RX ORDER — ACETAMINOPHEN 160 MG/5ML
650 SOLUTION ORAL EVERY 4 HOURS PRN
Status: DISCONTINUED | OUTPATIENT
Start: 2020-09-30 | End: 2020-10-02 | Stop reason: HOSPADM

## 2020-09-30 RX ORDER — CYANOCOBALAMIN 1000 UG/ML
1000 INJECTION, SOLUTION INTRAMUSCULAR; SUBCUTANEOUS
Status: DISCONTINUED | OUTPATIENT
Start: 2020-09-30 | End: 2020-10-02 | Stop reason: HOSPADM

## 2020-09-30 RX ORDER — POTASSIUM CHLORIDE 20 MEQ/1
60 TABLET, EXTENDED RELEASE ORAL EVERY MORNING
Status: DISCONTINUED | OUTPATIENT
Start: 2020-10-01 | End: 2020-10-02 | Stop reason: HOSPADM

## 2020-09-30 RX ORDER — POTASSIUM CHLORIDE 20 MEQ/1
40 TABLET, EXTENDED RELEASE ORAL EVERY EVENING
Status: DISCONTINUED | OUTPATIENT
Start: 2020-09-30 | End: 2020-10-02 | Stop reason: HOSPADM

## 2020-09-30 RX ORDER — LOPERAMIDE HYDROCHLORIDE 2 MG/1
2 CAPSULE ORAL 3 TIMES DAILY PRN
Status: DISCONTINUED | OUTPATIENT
Start: 2020-09-30 | End: 2020-10-02 | Stop reason: HOSPADM

## 2020-09-30 RX ORDER — SODIUM CHLORIDE 0.9 % (FLUSH) 0.9 %
10 SYRINGE (ML) INJECTION AS NEEDED
Status: DISCONTINUED | OUTPATIENT
Start: 2020-09-30 | End: 2020-10-02 | Stop reason: HOSPADM

## 2020-09-30 RX ORDER — POTASSIUM CHLORIDE 20 MEQ/1
60 TABLET, EXTENDED RELEASE ORAL EVERY MORNING
COMMUNITY
End: 2020-10-12 | Stop reason: SDUPTHER

## 2020-09-30 RX ORDER — PREGABALIN 100 MG/1
100 CAPSULE ORAL 3 TIMES DAILY
Status: DISCONTINUED | OUTPATIENT
Start: 2020-09-30 | End: 2020-10-02 | Stop reason: HOSPADM

## 2020-09-30 RX ORDER — NITROGLYCERIN 0.4 MG/1
0.4 TABLET SUBLINGUAL
Status: DISCONTINUED | OUTPATIENT
Start: 2020-09-30 | End: 2020-10-02 | Stop reason: HOSPADM

## 2020-09-30 RX ORDER — BISACODYL 10 MG
10 SUPPOSITORY, RECTAL RECTAL DAILY PRN
Status: DISCONTINUED | OUTPATIENT
Start: 2020-09-30 | End: 2020-10-02 | Stop reason: HOSPADM

## 2020-09-30 RX ORDER — ONDANSETRON 2 MG/ML
4 INJECTION INTRAMUSCULAR; INTRAVENOUS EVERY 6 HOURS PRN
Status: DISCONTINUED | OUTPATIENT
Start: 2020-09-30 | End: 2020-10-02 | Stop reason: HOSPADM

## 2020-09-30 RX ORDER — ACETAMINOPHEN 650 MG/1
650 SUPPOSITORY RECTAL EVERY 4 HOURS PRN
Status: DISCONTINUED | OUTPATIENT
Start: 2020-09-30 | End: 2020-10-02 | Stop reason: HOSPADM

## 2020-09-30 RX ORDER — ALUMINA, MAGNESIA, AND SIMETHICONE 2400; 2400; 240 MG/30ML; MG/30ML; MG/30ML
15 SUSPENSION ORAL EVERY 6 HOURS PRN
Status: DISCONTINUED | OUTPATIENT
Start: 2020-09-30 | End: 2020-10-02 | Stop reason: HOSPADM

## 2020-09-30 RX ORDER — TRAZODONE HYDROCHLORIDE 50 MG/1
50 TABLET ORAL NIGHTLY
Status: DISCONTINUED | OUTPATIENT
Start: 2020-09-30 | End: 2020-10-02 | Stop reason: HOSPADM

## 2020-09-30 RX ORDER — MAGNESIUM SULFATE HEPTAHYDRATE 40 MG/ML
2 INJECTION, SOLUTION INTRAVENOUS AS NEEDED
Status: DISCONTINUED | OUTPATIENT
Start: 2020-09-30 | End: 2020-10-02 | Stop reason: HOSPADM

## 2020-09-30 RX ORDER — WARFARIN SODIUM 5 MG/1
5.5 TABLET ORAL
COMMUNITY
End: 2020-11-18 | Stop reason: SDUPTHER

## 2020-09-30 RX ORDER — ASPIRIN 81 MG/1
324 TABLET, CHEWABLE ORAL ONCE
Status: COMPLETED | OUTPATIENT
Start: 2020-09-30 | End: 2020-09-30

## 2020-09-30 RX ORDER — POTASSIUM CHLORIDE 20 MEQ/1
40 TABLET, EXTENDED RELEASE ORAL EVERY EVENING
COMMUNITY
End: 2020-12-18

## 2020-09-30 RX ORDER — OXYCODONE HYDROCHLORIDE 5 MG/1
10 TABLET ORAL 3 TIMES DAILY PRN
Status: DISCONTINUED | OUTPATIENT
Start: 2020-09-30 | End: 2020-10-02 | Stop reason: HOSPADM

## 2020-09-30 RX ORDER — CHOLECALCIFEROL (VITAMIN D3) 125 MCG
5 CAPSULE ORAL NIGHTLY PRN
Status: DISCONTINUED | OUTPATIENT
Start: 2020-09-30 | End: 2020-10-02 | Stop reason: HOSPADM

## 2020-09-30 RX ORDER — MAGNESIUM SULFATE 1 G/100ML
1 INJECTION INTRAVENOUS AS NEEDED
Status: DISCONTINUED | OUTPATIENT
Start: 2020-09-30 | End: 2020-10-02 | Stop reason: HOSPADM

## 2020-09-30 RX ORDER — SODIUM CHLORIDE 0.9 % (FLUSH) 0.9 %
10 SYRINGE (ML) INJECTION EVERY 12 HOURS SCHEDULED
Status: DISCONTINUED | OUTPATIENT
Start: 2020-09-30 | End: 2020-10-02 | Stop reason: HOSPADM

## 2020-09-30 RX ADMIN — SODIUM CHLORIDE 5 MG: 900 INJECTION, SOLUTION INTRAVENOUS at 17:46

## 2020-09-30 RX ADMIN — DEXAMETHASONE SODIUM PHOSPHATE 6 MG: 4 INJECTION, SOLUTION INTRAMUSCULAR; INTRAVENOUS at 21:45

## 2020-09-30 RX ADMIN — ASPIRIN 81 MG CHEWABLE TABLET 324 MG: 81 TABLET CHEWABLE at 16:49

## 2020-09-30 RX ADMIN — CYANOCOBALAMIN 1000 MCG: 1000 INJECTION, SOLUTION INTRAMUSCULAR; SUBCUTANEOUS at 21:45

## 2020-09-30 RX ADMIN — TEMAZEPAM 30 MG: 15 CAPSULE ORAL at 22:09

## 2020-09-30 RX ADMIN — Medication 10 ML: at 21:45

## 2020-09-30 RX ADMIN — TRAZODONE HYDROCHLORIDE 50 MG: 50 TABLET ORAL at 21:44

## 2020-09-30 RX ADMIN — ATORVASTATIN CALCIUM 20 MG: 20 TABLET, FILM COATED ORAL at 21:44

## 2020-09-30 RX ADMIN — OXYCODONE 10 MG: 5 TABLET ORAL at 22:09

## 2020-09-30 RX ADMIN — MAGNESIUM GLUCONATE 500 MG ORAL TABLET 400 MG: 500 TABLET ORAL at 21:44

## 2020-09-30 RX ADMIN — PREGABALIN 100 MG: 100 CAPSULE ORAL at 21:44

## 2020-09-30 RX ADMIN — SERTRALINE HYDROCHLORIDE 50 MG: 50 TABLET ORAL at 21:44

## 2020-09-30 RX ADMIN — POTASSIUM CHLORIDE 40 MEQ: 1500 TABLET, EXTENDED RELEASE ORAL at 21:44

## 2020-10-01 PROBLEM — K92.1 HEMATOCHEZIA: Status: ACTIVE | Noted: 2020-10-01

## 2020-10-01 PROBLEM — Z82.3 FAMILY HISTORY OF CEREBROVASCULAR ACCIDENT (CVA): Status: ACTIVE | Noted: 2020-10-01

## 2020-10-01 PROBLEM — I10 ESSENTIAL HYPERTENSION: Chronic | Status: ACTIVE | Noted: 2017-04-19

## 2020-10-01 PROBLEM — R68.84 JAW PAIN: Status: ACTIVE | Noted: 2020-10-01

## 2020-10-01 PROBLEM — C79.31 BRAIN METASTASES: Status: ACTIVE | Noted: 2020-09-18

## 2020-10-01 PROBLEM — I82.409 DVT (DEEP VENOUS THROMBOSIS): Status: ACTIVE | Noted: 2020-07-03

## 2020-10-01 PROBLEM — G47.00 INSOMNIA: Chronic | Status: ACTIVE | Noted: 2017-03-29

## 2020-10-01 PROBLEM — E66.9 OBESITY (BMI 30-39.9): Chronic | Status: ACTIVE | Noted: 2020-10-01

## 2020-10-01 PROBLEM — F32.A DEPRESSION: Chronic | Status: ACTIVE | Noted: 2020-10-01

## 2020-10-01 PROBLEM — D64.9 ANEMIA: Chronic | Status: ACTIVE | Noted: 2020-10-01

## 2020-10-01 PROBLEM — M81.0 OSTEOPOROSIS: Status: ACTIVE | Noted: 2020-10-01

## 2020-10-01 PROBLEM — G47.00 INSOMNIA: Status: ACTIVE | Noted: 2017-03-29

## 2020-10-01 PROBLEM — F41.9 ANXIETY: Status: ACTIVE | Noted: 2018-08-02

## 2020-10-01 PROBLEM — R22.9 MASS OF SKIN: Status: ACTIVE | Noted: 2017-12-28

## 2020-10-01 PROBLEM — Z83.3 FAMILY HISTORY OF DIABETES MELLITUS: Status: ACTIVE | Noted: 2020-10-01

## 2020-10-01 PROBLEM — C56.1 MALIGNANT NEOPLASM OF RIGHT OVARY (HCC): Chronic | Status: ACTIVE | Noted: 2019-06-14

## 2020-10-01 PROBLEM — Z86.718 HISTORY OF DVT (DEEP VEIN THROMBOSIS): Chronic | Status: ACTIVE | Noted: 2020-10-01

## 2020-10-01 PROBLEM — R91.1 LUNG NODULE: Status: ACTIVE | Noted: 2020-07-03

## 2020-10-01 PROBLEM — B02.9 SHINGLES: Status: ACTIVE | Noted: 2018-12-14

## 2020-10-01 LAB
ANION GAP SERPL CALCULATED.3IONS-SCNC: 10 MMOL/L (ref 5–15)
BASOPHILS # BLD AUTO: 0 10*3/MM3 (ref 0–0.2)
BASOPHILS NFR BLD AUTO: 0.4 % (ref 0–1.5)
BUN SERPL-MCNC: 18 MG/DL (ref 8–23)
BUN SERPL-MCNC: ABNORMAL MG/DL
BUN/CREAT SERPL: ABNORMAL
CALCIUM SPEC-SCNC: 9.4 MG/DL (ref 8.6–10.5)
CHLORIDE SERPL-SCNC: 104 MMOL/L (ref 98–107)
CO2 SERPL-SCNC: 27 MMOL/L (ref 22–29)
CREAT SERPL-MCNC: 1.02 MG/DL (ref 0.57–1)
DEPRECATED RDW RBC AUTO: 60.4 FL (ref 37–54)
EOSINOPHIL # BLD AUTO: 0 10*3/MM3 (ref 0–0.4)
EOSINOPHIL NFR BLD AUTO: 0.4 % (ref 0.3–6.2)
ERYTHROCYTE [DISTWIDTH] IN BLOOD BY AUTOMATED COUNT: 18.4 % (ref 12.3–15.4)
ERYTHROCYTE [SEDIMENTATION RATE] IN BLOOD: 73 MM/HR (ref 0–30)
GFR SERPL CREATININE-BSD FRML MDRD: 54 ML/MIN/1.73
GLUCOSE SERPL-MCNC: 147 MG/DL (ref 65–99)
HCT VFR BLD AUTO: 34 % (ref 34–46.6)
HGB BLD-MCNC: 11.1 G/DL (ref 12–15.9)
INR PPP: 1.36 (ref 2–3)
LYMPHOCYTES # BLD AUTO: 0.5 10*3/MM3 (ref 0.7–3.1)
LYMPHOCYTES NFR BLD AUTO: 13 % (ref 19.6–45.3)
MAGNESIUM SERPL-MCNC: 1.6 MG/DL (ref 1.6–2.4)
MCH RBC QN AUTO: 30.2 PG (ref 26.6–33)
MCHC RBC AUTO-ENTMCNC: 32.5 G/DL (ref 31.5–35.7)
MCV RBC AUTO: 93 FL (ref 79–97)
MONOCYTES # BLD AUTO: 0.2 10*3/MM3 (ref 0.1–0.9)
MONOCYTES NFR BLD AUTO: 4 % (ref 5–12)
NEUTROPHILS NFR BLD AUTO: 3.3 10*3/MM3 (ref 1.7–7)
NEUTROPHILS NFR BLD AUTO: 82.2 % (ref 42.7–76)
NRBC BLD AUTO-RTO: 0.5 /100 WBC (ref 0–0.2)
PLATELET # BLD AUTO: 211 10*3/MM3 (ref 140–450)
PMV BLD AUTO: 9 FL (ref 6–12)
POTASSIUM SERPL-SCNC: 4.5 MMOL/L (ref 3.5–5.2)
PROTHROMBIN TIME: 14.7 SECONDS (ref 19.4–28.5)
RBC # BLD AUTO: 3.66 10*6/MM3 (ref 3.77–5.28)
SODIUM SERPL-SCNC: 141 MMOL/L (ref 136–145)
T4 FREE SERPL-MCNC: 0.9 NG/DL (ref 0.93–1.7)
TROPONIN T SERPL-MCNC: <0.01 NG/ML (ref 0–0.03)
TSH SERPL DL<=0.05 MIU/L-ACNC: 0.54 UIU/ML (ref 0.27–4.2)
VIT B12 BLD-MCNC: >2000 PG/ML (ref 211–946)
WBC # BLD AUTO: 4 10*3/MM3 (ref 3.4–10.8)

## 2020-10-01 PROCEDURE — 84484 ASSAY OF TROPONIN QUANT: CPT | Performed by: PHYSICIAN ASSISTANT

## 2020-10-01 PROCEDURE — 85025 COMPLETE CBC W/AUTO DIFF WBC: CPT | Performed by: PHYSICIAN ASSISTANT

## 2020-10-01 PROCEDURE — 99225 PR SBSQ OBSERVATION CARE/DAY 25 MINUTES: CPT | Performed by: HOSPITALIST

## 2020-10-01 PROCEDURE — 84439 ASSAY OF FREE THYROXINE: CPT | Performed by: HOSPITALIST

## 2020-10-01 PROCEDURE — 25010000002 DEXAMETHASONE PER 1 MG: Performed by: PHYSICIAN ASSISTANT

## 2020-10-01 PROCEDURE — 25010000002 LEVOFLOXACIN PER 250 MG: Performed by: HOSPITALIST

## 2020-10-01 PROCEDURE — G0378 HOSPITAL OBSERVATION PER HR: HCPCS

## 2020-10-01 PROCEDURE — 96375 TX/PRO/DX INJ NEW DRUG ADDON: CPT

## 2020-10-01 PROCEDURE — 83735 ASSAY OF MAGNESIUM: CPT | Performed by: PHYSICIAN ASSISTANT

## 2020-10-01 PROCEDURE — 84443 ASSAY THYROID STIM HORMONE: CPT | Performed by: HOSPITALIST

## 2020-10-01 PROCEDURE — 82607 VITAMIN B-12: CPT | Performed by: HOSPITALIST

## 2020-10-01 PROCEDURE — 85652 RBC SED RATE AUTOMATED: CPT | Performed by: HOSPITALIST

## 2020-10-01 PROCEDURE — 96376 TX/PRO/DX INJ SAME DRUG ADON: CPT

## 2020-10-01 PROCEDURE — 96367 TX/PROPH/DG ADDL SEQ IV INF: CPT

## 2020-10-01 PROCEDURE — 85610 PROTHROMBIN TIME: CPT | Performed by: HOSPITALIST

## 2020-10-01 PROCEDURE — 80048 BASIC METABOLIC PNL TOTAL CA: CPT | Performed by: PHYSICIAN ASSISTANT

## 2020-10-01 RX ORDER — LEVOFLOXACIN 5 MG/ML
750 INJECTION, SOLUTION INTRAVENOUS EVERY 24 HOURS
Status: DISCONTINUED | OUTPATIENT
Start: 2020-10-01 | End: 2020-10-02

## 2020-10-01 RX ORDER — CLINDAMYCIN PHOSPHATE 600 MG/50ML
600 INJECTION, SOLUTION INTRAVENOUS EVERY 8 HOURS
Status: DISCONTINUED | OUTPATIENT
Start: 2020-10-01 | End: 2020-10-02

## 2020-10-01 RX ADMIN — SERTRALINE HYDROCHLORIDE 50 MG: 50 TABLET ORAL at 17:08

## 2020-10-01 RX ADMIN — MAGNESIUM GLUCONATE 500 MG ORAL TABLET 400 MG: 500 TABLET ORAL at 09:18

## 2020-10-01 RX ADMIN — LEVOFLOXACIN 750 MG: 5 INJECTION, SOLUTION INTRAVENOUS at 21:07

## 2020-10-01 RX ADMIN — ATORVASTATIN CALCIUM 20 MG: 20 TABLET, FILM COATED ORAL at 21:06

## 2020-10-01 RX ADMIN — PREGABALIN 100 MG: 100 CAPSULE ORAL at 17:07

## 2020-10-01 RX ADMIN — PANTOPRAZOLE SODIUM 40 MG: 40 INJECTION, POWDER, FOR SOLUTION INTRAVENOUS at 06:34

## 2020-10-01 RX ADMIN — DEXAMETHASONE SODIUM PHOSPHATE 6 MG: 4 INJECTION, SOLUTION INTRAMUSCULAR; INTRAVENOUS at 09:18

## 2020-10-01 RX ADMIN — MAGNESIUM GLUCONATE 500 MG ORAL TABLET 400 MG: 500 TABLET ORAL at 21:06

## 2020-10-01 RX ADMIN — POTASSIUM CHLORIDE 40 MEQ: 1500 TABLET, EXTENDED RELEASE ORAL at 17:08

## 2020-10-01 RX ADMIN — TRIAMTERENE AND HYDROCHLOROTHIAZIDE 1 TABLET: 37.5; 25 TABLET ORAL at 09:18

## 2020-10-01 RX ADMIN — PREGABALIN 100 MG: 100 CAPSULE ORAL at 09:18

## 2020-10-01 RX ADMIN — Medication 10 ML: at 09:19

## 2020-10-01 RX ADMIN — CLINDAMYCIN PHOSPHATE 600 MG: 600 INJECTION, SOLUTION INTRAVENOUS at 21:06

## 2020-10-01 RX ADMIN — PREGABALIN 100 MG: 100 CAPSULE ORAL at 21:06

## 2020-10-01 RX ADMIN — Medication 10 ML: at 21:07

## 2020-10-01 RX ADMIN — TRAZODONE HYDROCHLORIDE 50 MG: 50 TABLET ORAL at 21:06

## 2020-10-01 RX ADMIN — DEXAMETHASONE SODIUM PHOSPHATE 6 MG: 4 INJECTION, SOLUTION INTRAMUSCULAR; INTRAVENOUS at 21:06

## 2020-10-01 RX ADMIN — POTASSIUM CHLORIDE 60 MEQ: 1500 TABLET, EXTENDED RELEASE ORAL at 06:33

## 2020-10-01 RX ADMIN — TEMAZEPAM 30 MG: 15 CAPSULE ORAL at 21:06

## 2020-10-01 NOTE — PLAN OF CARE
Pt not seen this date, due to INR at 7.51. PT will follow up tomorrow if pt is appropriate. PT did not enter room.  Leroy Velasco PT, DPT

## 2020-10-01 NOTE — PLAN OF CARE
Patient had some jaw pain on her left side that started around 2 to 3 days ago.  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  Current INR is 7.51.  she is currently followed with Dr. Gasca at [facility] as well as Dr. Cisneros with radiation oncology.  Patient was last seen on 9/18/2020 for review of her recent MRI.  They are aware of the interval increase in size of the left parietal and left cerebellar metastasis.  I with Dr. Belcher regarding this patient as Dr. Chavarria is out of town at this time.  Recommendations were to speak with patient and inquire whether she wanted transfer care to [facility] [place] neurosurgery in which case she would be transferred to [facility] under the care of Dr. Belcher or if she wanted to continue with Dr. gasca in her rad/onc team at [facility].  Patient stated she wanted to continue care with Dr. gasca.  Dr. Belcher recommends correction of her INR  and if there is any concern regarding mastoiditis patient be transferred to [facility].

## 2020-10-01 NOTE — PROGRESS NOTES
"      [facility] Medicine Services Daily Progress Note      Hospitalist Team  LOS 0 days      Patient Care Team:  Noemy Queen MD as PCP - General (Family Medicine)  Daryn Tay MD as Consulting Physician (Hematology and Oncology)    Patient Location: [facility]/1      Subjective   Subjective     Chief Complaint / Subjective  Chief Complaint   Patient presents with   • Facial Swelling         Brief Synopsis of Hospital Course/HPI       The patient is a 65 y.o. female with metastatic ovarian cancer to the brain and DVT on chronic anticoagulation with Coumadin that presented to the emergency room on 9/30/20 complaining of about 3 days of worsening left sided facial pain that is worse with chewing and opening of mouth.  The patient follows with neurosurgeon, Dr. Hartman and has had recent work-up with MRI of the brain at [place].  The patient denies recent fevers, chills, sweats, trauma to the face, nausea, vomiting or abdominal pain.    In the ED, INR was 7.51, hemoglobin 10.8 and chest x-ray ruled out pneumonia or effusions.  CT of the head without contrast showed masses in the left frontal precentral gyrus and left cerebellar hemisphere associated with vasogenic edema.  The patient was given vitamin K in the ED.    Date::    10/1/20: Follows with Dr. Hartman neurosurgeon.  Complains of left jaw pain worse with opening mouth.  Elevated INR but no evidence of bleeding.      Review of Systems   All other systems reviewed and are negative.        Objective   Objective      Vital Signs  Temp:  [97.4 °F (36.3 °C)-98.3 °F (36.8 °C)] 97.6 °F (36.4 °C)  Heart Rate:  [49-76] 53  Resp:  [10-16] 11  BP: (104-134)/(61-71) 108/66  Oxygen Therapy  SpO2: 96 %  Pulse Oximetry Type: Intermittent  Device (Oxygen Therapy): room air  Flowsheet Rows      First Filed Value   Admission Height  165.1 cm (65\") Documented at 09/30/2020 1622   Admission Weight  82.4 kg (181 lb 10.5 oz) Documented at 09/30/2020 1622        "     Intake & Output (last 3 days)       09/28 0701 - 09/29 0700 09/29 0701 - 09/30 0700 09/30 0701 - 10/01 0700 10/01 0701 - 10/02 0700    P.O.    200    Total Intake(mL/kg)    200 (2.4)    Net    +200            Urine Unmeasured Occurrence    1 x    Stool Unmeasured Occurrence    1 x        Lines, Drains & Airways    Active LDAs     Name:   Placement date:   Placement time:   Site:   Days:    Peripheral IV 09/30/20 1650 Right Antecubital   09/30/20 1650    Antecubital   less than 1    Single Lumen Implantable Port Left Subclavian   --    --    Subclavian                     Physical Exam:    Physical Exam  HENT:      Head: Normocephalic.   Eyes:      Extraocular Movements: Extraocular movements intact.      Pupils: Pupils are equal, round, and reactive to light.   Neck:      Musculoskeletal: Normal range of motion.   Cardiovascular:      Rate and Rhythm: Normal rate and regular rhythm.      Pulses: Normal pulses.   Pulmonary:      Effort: Pulmonary effort is normal.      Breath sounds: Normal breath sounds.   Abdominal:      General: Bowel sounds are normal.      Palpations: Abdomen is soft.   Musculoskeletal: Normal range of motion.   Skin:     General: Skin is warm and dry.   Neurological:      General: No focal deficit present.      Mental Status: She is alert and oriented to person, place, and time.   Psychiatric:         Mood and Affect: Mood normal.         Behavior: Behavior normal.               Procedures:              Results Review:     I reviewed the patient's new clinical results.      Lab Results (last 24 hours)     Procedure Component Value Units Date/Time    Vitamin B12 [[id number]]  (Abnormal) Collected: 10/01/20 0804    Specimen: Blood Updated: 10/01/20 1100     Vitamin B-12 >2,000 pg/mL     Narrative:      Results may be falsely increased if patient taking Biotin.      Sedimentation Rate [[id number]]  (Abnormal) Collected: 10/01/20 0804    Specimen: Blood Updated: 10/01/20 0914     Sed Rate 73  mm/hr     TSH [[id number]]  (Normal) Collected: 10/01/20 0742    Specimen: Blood Updated: 10/01/20 0833     TSH 0.543 uIU/mL     T4, Free [[id number]] Collected: 10/01/20 0742    Specimen: Blood Updated: 10/01/20 0811    Troponin [[id number]]  (Normal) Collected: 10/01/20 0742    Specimen: Blood Updated: 10/01/20 0810     Troponin T <0.010 ng/mL     Narrative:      Troponin T Reference Range:  <= 0.03 ng/mL-   Negative for AMI  >0.03 ng/mL-     Abnormal for myocardial necrosis.  Clinicians would have to utilize clinical acumen, EKG, Troponin and serial changes to determine if it is an Acute Myocardial Infarction or myocardial injury due to an underlying chronic condition.       Results may be falsely decreased if patient taking Biotin.      BUN [[id number]]  (Normal) Collected: 10/01/20 0239    Specimen: Blood Updated: 10/01/20 0716     BUN 18 mg/dL     Basic Metabolic Panel [[id number]]  (Abnormal) Collected: 10/01/20 0239    Specimen: Blood Updated: 10/01/20 0403     Glucose 147 mg/dL      BUN --     Comment: Testing performed by alternate method        Creatinine 1.02 mg/dL      Sodium 141 mmol/L      Potassium 4.5 mmol/L      Chloride 104 mmol/L      CO2 27.0 mmol/L      Calcium 9.4 mg/dL      eGFR Non African Amer 54 mL/min/1.73      BUN/Creatinine Ratio --     Comment: Testing not performed        Anion Gap 10.0 mmol/L     Narrative:      GFR Normal >60  Chronic Kidney Disease <60  Kidney Failure <15      Troponin [[id number]]  (Normal) Collected: 10/01/20 0239    Specimen: Blood Updated: 10/01/20 0403     Troponin T <0.010 ng/mL     Narrative:      Troponin T Reference Range:  <= 0.03 ng/mL-   Negative for AMI  >0.03 ng/mL-     Abnormal for myocardial necrosis.  Clinicians would have to utilize clinical acumen, EKG, Troponin and serial changes to determine if it is an Acute Myocardial Infarction or myocardial injury due to an underlying chronic condition.       Results may be falsely decreased if patient taking  Biotin.      Magnesium [[id number]]  (Normal) Collected: 10/01/20 0239    Specimen: Blood Updated: 10/01/20 0403     Magnesium 1.6 mg/dL     CBC Auto Differential [[id number]]  (Abnormal) Collected: 10/01/20 0239    Specimen: Blood Updated: 10/01/20 0342     WBC 4.00 10*3/mm3      RBC 3.66 10*6/mm3      Hemoglobin 11.1 g/dL      Hematocrit 34.0 %      MCV 93.0 fL      MCH 30.2 pg      MCHC 32.5 g/dL      RDW 18.4 %      RDW-SD 60.4 fl      MPV 9.0 fL      Platelets 211 10*3/mm3      Neutrophil % 82.2 %      Lymphocyte % 13.0 %      Monocyte % 4.0 %      Eosinophil % 0.4 %      Basophil % 0.4 %      Neutrophils, Absolute 3.30 10*3/mm3      Lymphocytes, Absolute 0.50 10*3/mm3      Monocytes, Absolute 0.20 10*3/mm3      Eosinophils, Absolute 0.00 10*3/mm3      Basophils, Absolute 0.00 10*3/mm3      nRBC 0.5 /100 WBC     Troponin [[id number]]  (Normal) Collected: 09/30/20 2036    Specimen: Blood Updated: 09/30/20 2115     Troponin T <0.010 ng/mL     Narrative:      Troponin T Reference Range:  <= 0.03 ng/mL-   Negative for AMI  >0.03 ng/mL-     Abnormal for myocardial necrosis.  Clinicians would have to utilize clinical acumen, EKG, Troponin and serial changes to determine if it is an Acute Myocardial Infarction or myocardial injury due to an underlying chronic condition.       Results may be falsely decreased if patient taking Biotin.      Hemoglobin & Hematocrit, Blood [[id number]]  (Abnormal) Collected: 09/30/20 2036    Specimen: Blood Updated: 09/30/20 2052     Hemoglobin 10.6 g/dL      Hematocrit 33.2 %     Troponin [[id number]]  (Normal) Collected: 09/30/20 1931    Specimen: Blood Updated: 09/30/20 1959     Troponin T <0.010 ng/mL     Narrative:      Troponin T Reference Range:  <= 0.03 ng/mL-   Negative for AMI  >0.03 ng/mL-     Abnormal for myocardial necrosis.  Clinicians would have to utilize clinical acumen, EKG, Troponin and serial changes to determine if it is an Acute Myocardial Infarction or myocardial injury  due to an underlying chronic condition.       Results may be falsely decreased if patient taking Biotin.      BUN [[id number]]  (Normal) Collected: 09/30/20 1650    Specimen: Blood Updated: 09/30/20 1734     BUN 17 mg/dL     Comprehensive Metabolic Panel [[id number]]  (Abnormal) Collected: 09/30/20 1650    Specimen: Blood Updated: 09/30/20 1732     Glucose 152 mg/dL      BUN --     Comment: Testing performed by alternate method        Creatinine 1.02 mg/dL      Sodium 139 mmol/L      Potassium 4.0 mmol/L      Chloride 103 mmol/L      CO2 26.0 mmol/L      Calcium 9.5 mg/dL      Total Protein 6.7 g/dL      Albumin 3.90 g/dL      ALT (SGPT) 11 U/L      AST (SGOT) 20 U/L      Alkaline Phosphatase 150 U/L      Total Bilirubin 0.2 mg/dL      eGFR Non African Amer 54 mL/min/1.73      Globulin 2.8 gm/dL      A/G Ratio 1.4 g/dL      BUN/Creatinine Ratio --     Comment: Testing not performed        Anion Gap 10.0 mmol/L     Narrative:      GFR Normal >60  Chronic Kidney Disease <60  Kidney Failure <15      Troponin [[id number]]  (Normal) Collected: 09/30/20 1650    Specimen: Blood Updated: 09/30/20 1732     Troponin T <0.010 ng/mL     Narrative:      Troponin T Reference Range:  <= 0.03 ng/mL-   Negative for AMI  >0.03 ng/mL-     Abnormal for myocardial necrosis.  Clinicians would have to utilize clinical acumen, EKG, Troponin and serial changes to determine if it is an Acute Myocardial Infarction or myocardial injury due to an underlying chronic condition.       Results may be falsely decreased if patient taking Biotin.      Protime-INR [[id number]]  (Abnormal) Collected: 09/30/20 1650    Specimen: Blood Updated: 09/30/20 1720     Protime 75.0 Seconds      INR 7.51    CBC & Differential [[id number]]  (Abnormal) Collected: 09/30/20 1650    Specimen: Blood Updated: 09/30/20 1657    Narrative:      The following orders were created for panel order CBC & Differential.  Procedure                               Abnormality          Status                     ---------                               -----------         ------                     CBC Auto Differential[[id number]]        Abnormal            Final result                 Please view results for these tests on the individual orders.    CBC Auto Differential [[id number]]  (Abnormal) Collected: 09/30/20 1650    Specimen: Blood Updated: 09/30/20 1657     WBC 4.70 10*3/mm3      RBC 3.60 10*6/mm3      Hemoglobin 10.8 g/dL      Hematocrit 33.5 %      MCV 93.3 fL      MCH 30.0 pg      MCHC 32.1 g/dL      RDW 18.1 %      RDW-SD 59.1 fl      MPV 8.5 fL      Platelets 224 10*3/mm3      Neutrophil % 68.4 %      Lymphocyte % 19.2 %      Monocyte % 8.0 %      Eosinophil % 3.6 %      Basophil % 0.8 %      Neutrophils, Absolute 3.20 10*3/mm3      Lymphocytes, Absolute 0.90 10*3/mm3      Monocytes, Absolute 0.40 10*3/mm3      Eosinophils, Absolute 0.20 10*3/mm3      Basophils, Absolute 0.00 10*3/mm3      nRBC 0.2 /100 WBC         No results found for: HGBA1C  Results from last 7 days   Lab Units 09/30/20  1650   INR  7.51*           Lab Results   Component Value Date    LIPASE 20 (L) 03/21/2017     Lab Results   Component Value Date    CHOL 194 09/22/2020    TRIG 74 09/22/2020    HDL 56 09/22/2020     (H) 09/22/2020       No results found for: INTRAOP, PREDX, FINALDX, COMDX    Microbiology Results (last 10 days)     ** No results found for the last 240 hours. **          ECG/EMG Results (most recent)     Procedure Component Value Units Date/Time    ECG 12 Lead [[id number]] Collected: 09/30/20 1640     Updated: 09/30/20 1643    Narrative:      HEART RATE= 65  bpm  RR Interval= 928  ms  MN Interval= 156  ms  P Horizontal Axis= -5  deg  P Front Axis= 63  deg  QRSD Interval= 90  ms  QT Interval= 410  ms  QRS Axis= -4  deg  T Wave Axis= 33  deg  - BORDERLINE ECG -  Sinus rhythm  Probable left atrial enlargement  When compared with ECG of 30-Aug-2019 17:34:37,  Significant axis, voltage or hypertrophy  change  Electronically Signed By:   Date and Time of Study: 2020-09-30 16:40:03               Results for orders placed during the hospital encounter of 01/13/20   Adult Transthoracic Echo Complete W/ Cont if Necessary Per Protocol    Narrative · Left ventricular systolic function is normal.          Ct Head Without Contrast    Result Date: 9/30/2020   1. Slightly hyperdense masses in the left frontal precentral gyrus and the left cerebellar hemisphere, most consistent with intracranial metastatic disease, with mild associated vasogenic edema in the left cerebellar hemisphere. 2. Mild nonspecific partial opacification of the left mastoid air cells, possible simple effusion versus simple mastoiditis.  Electronically Signed By-Rusty Julian On:9/30/2020 6:31 PM This report was finalized on [id number] by  Rusty Julian .    Xr Chest 1 View    Result Date: 9/30/2020  Limited study demonstrating chronic changes with no active disease. Lung nodule seen on the prior CT study are too small to visualize on radiographs.  Electronically Signed By-Rusty Julian On:9/30/2020 5:54 PM This report was finalized on [id number] by  Rusty Julian .          Xrays, labs reviewed personally by physician.    Medication Review:   I have reviewed the patient's current medication list      Scheduled Meds  atorvastatin, 20 mg, Oral, Nightly  cyanocobalamin, 1,000 mcg, Intramuscular, Q28 Days  dexamethasone, 6 mg, Intravenous, BID  magnesium oxide, 400 mg, Oral, BID  pantoprazole, 40 mg, Intravenous, Q AM  potassium chloride, 40 mEq, Oral, Q PM  potassium chloride, 60 mEq, Oral, QAM  pregabalin, 100 mg, Oral, TID  sertraline, 50 mg, Oral, Q PM  sodium chloride, 10 mL, Intravenous, Q12H  traZODone, 50 mg, Oral, Nightly  triamterene-hydrochlorothiazide, 1 tablet, Oral, Daily        Meds Infusions       Meds PRN  •  acetaminophen **OR** acetaminophen **OR** acetaminophen  •  aluminum-magnesium hydroxide-simethicone  •  bisacodyl  •   loperamide  •  magnesium hydroxide  •  magnesium sulfate **OR** magnesium sulfate in D5W 1g/100mL (PREMIX)  •  melatonin  •  nitroglycerin  •  ondansetron **OR** ondansetron  •  oxyCODONE  •  potassium chloride  •  sodium chloride  •  sodium chloride  •  temazepam        Assessment/Plan   Assessment/Plan     Active Hospital Problems    Diagnosis  POA   • **Poisoning by warfarin sodium [T45.511A]  Yes   • History of DVT (deep vein thrombosis) [Z86.718]  Not Applicable   • Hematochezia [K92.1]  Yes   • Jaw pain [R68.84]  Yes   • Obesity (BMI 30-39.9) [E66.9]  Yes   • Depression [F32.9]  Yes   • Anemia [D64.9]  Yes   • Brain metastases (CMS/HCC) [C79.31]  Yes   • Malignant neoplasm of right ovary (CMS/HCC) [C56.1]  Yes   • Essential hypertension [I10]  Yes   • Insomnia [G47.00]  Yes   • Hyperlipidemia [E78.5]  Yes      Resolved Hospital Problems   No resolved problems to display.       MEDICAL DECISION MAKING COMPLEXITY BY PROBLEM:     Left-sided jaw pain:  -Denies history of TMJ or temporal arteritis  -s/p CT head in the ED  -Neurosurgery consulted  -Oncology consulted   -Start Decadron 6 mg twice daily  -Pain control with home Roxicodone, inspect verified  -Neurology consulted    Supratherapeutic INR,   -INR elevated at 7.51  -Given vitamin K 5 mg in ED  -Hold home warfarin       history of DVT:  -On chronic Coumadin    Hematochezia  -Monitor H&H.     Normocytic normochromic anemia:  -Monitor H&H     Ovarian cancer with metastatic disease to brain  -Oncology consulted, Dr. Ball     Anxiety/depression/insomnia  -Continue home Restoril, Zoloft, trazodone, Lyrica     Essential hypertension:  -Continue home Dyazide     Hyperlipidemia  -Continue home statin         VTE Prophylaxis -   Mechanical Order History:     None      Pharmalogical Order History:     None            Code Status -   Code Status and Medical Interventions:   Ordered at: 09/30/20 2025     Code Status:    CPR     Medical Interventions (Level of Support  Prior to Arrest):    Full       This patient has been examined wearing appropriate Personal Protective Equipment and . 10/01/20          Electronically signed by Viraj Flower DO, 10/01/20, 11:17 EDT.  [facility]

## 2020-10-01 NOTE — PROGRESS NOTES
Called the [place] transfer center and discussed case with neurosurgery JACI Wolf and he does not think that the patient warrants transfer.  I  then spoke to hospitalist, Dr. Sigifredo Miller and he does not think that the patient warrants inpatient care and recommends that she follow-up in the outpatient setting.  They are aware that the patient would like to transfer to [place] and has her neurosurgeon, Dr. Hartman and  radiation/oncologist that practice at [facility].  INR was repeated this afternoon and is 1.36.  The patient has penicillin allergy.  The patient has been started on clindamycin and Levaquin for left sided mastoiditis.

## 2020-10-01 NOTE — H&P
[facility] Medicine Services      Patient Name: Tahira Zimmerman  : 1955  MRN: [id number]  Primary Care Physician: Noemy Queen MD  Date of admission: 2020    Patient Care Team:  Noemy Queen MD as PCP - General (Family Medicine)  Daryn Tay MD as Consulting Physician (Hematology and Oncology)          Subjective   History Present Illness     Chief Complaint:   Chief Complaint   Patient presents with   • Facial Swelling         Ms. Zimmerman is a 65 y.o. female presents to [facility] ER on 2020 complaining of left-sided facial pain and swelling for the past 3 days.  Patient states her pain is a shooting pain down the side of her face that is worse with movement.  Patient states that today has hurt her mouth and face to chew food on the left side.  Patient states her pain currently is a 7 out of 10.  Patient also reports some bright red blood per stool for the past 2 days but denies any history of hemorrhoids or any bleeding in the past.  Patient states that she was told a few months ago that she has metastatic disease to the brain and had a follow-up appointment with [facility] for this a few days ago. Patient reports some chills but denies fever, cough, chest pain, shortness of breath, nausea, vomiting, diarrhea, urinary symptoms or swelling in her legs bilaterally.  Patient has a history of ovarian cancer in which she sees Dr. Ball.  Patient states that she is currently receiving chemotherapy for this.    In the ED, initial troponin negative, GFR 54, alk phos of 150 otherwise largely unremarkable CMP.  INR elevated at 7.51, PTT elevated at 75.  Hemoglobin low at 10.8 which was 10.0 about 2 weeks ago.  MCV of 93.3, MCH of 30.0.  Chest x-ray shows limited study demonstrating chronic changes with no active disease.  Lung nodule seen on the prior CT study are too small to visualize on radiographs.  CT head shows slightly hyperdense  masses in the left frontal precentral gyrus and left cerebral hemisphere, most consistent with intracranial metastatic disease, with mild associated vasogenic edema in the left cerebral hemisphere.  Mild nonspecific partial opacification of the left mastoid air cells, possible simple effusion versus simple mastoiditis.  EKG shows sinus rhythm 65 bpm, probable atrial enlargement, no ST elevation apparent.  Patient is afebrile, pulse in the 70s, on room air oxygen and 98% SPO2 and blood pressure normotensive.  Patient given aspirin and vitamin K 5 mg in ED.    Review of Systems   Constitution: Negative. Negative for chills and fever.   HENT: Negative.         Jaw pain     Cardiovascular: Negative.  Negative for chest pain.   Respiratory: Negative.  Negative for cough and shortness of breath.    Skin: Negative.    Musculoskeletal: Negative.    Gastrointestinal: Positive for hematochezia. Negative for abdominal pain, diarrhea, melena, nausea and vomiting.   Genitourinary: Negative.    Neurological: Negative.    Psychiatric/Behavioral: Negative.            Personal History     Past Medical History:   Past Medical History:   Diagnosis Date   • Anemia 2013   • Cervical disc disorder 2013    Herniated disc, pinched nerve   • Clotting disorder (CMS/HCC) 2013    Low platelets from chemo   • Colon polyp 2013   • Deep vein thrombosis (CMS/HCC) 2013   • Diverticulosis 2013   • GERD (gastroesophageal reflux disease) 2016   • HL (hearing loss) 2016    I need hearing aids   • Hyperlipidemia 2013    Need my cholesterol rechecked   • Hypertension    • Joint pain 2013    Shoulder pain, torn rotator cuff   • Low back pain 2013   • Neuromuscular disorder (CMS/HCC) 2015    Shingles, pinched nerves in my neck   • Osteopenia 2014   • Osteoporosis    • Ovarian cancer (CMS/HCC)     ovarian   • Pneumonia 2010    Have had it several times   • Spinal stenosis 2013    Cervical   • Urinary tract infection 2013    Have had several utis              Surgical History:      Past Surgical History:   Procedure Laterality Date   • COLON SURGERY     • COLONOSCOPY  05/26/2018   • EXPLORATORY LAPAROTOMY     • HERNIA REPAIR     • HYSTERECTOMY     • OOPHORECTOMY             Family History: family history includes Cancer in her father; Hypertension in her brother, father, mother, and sister; Stroke in her brother. Otherwise pertinent FHx was reviewed and unremarkable.     Social History:  reports that she has never smoked. She has never used smokeless tobacco. She reports that she does not drink alcohol or use drugs.      Medications:  Prior to Admission medications    Medication Sig Start Date End Date Taking? Authorizing Provider   atorvastatin (LIPITOR) 20 MG tablet Take 20 mg by mouth every night at bedtime. 5/11/19  Yes Ella Andres MD   cyanocobalamin 1000 MCG/ML injection Inject 1,000 mcg into the appropriate muscle as directed by prescriber Every 28 (Twenty-Eight) Days.   Yes Ella Andres MD   famotidine (PEPCID) 40 MG tablet TAKE 1 TABLET BY MOUTH EVERY MORNING BEFORE BREAKFAST 3/3/20  Yes Mira Jeffrey APRN   magnesium oxide (MAG-OX) 400 MG tablet TAKE 1 TABLET BY MOUTH TWICE A DAY 6/1/20  Yes Daryn Tay MD   oxyCODONE (ROXICODONE) 10 MG tablet Take 1 tablet by mouth 3 (Three) Times a Day As Needed for Moderate Pain . 7/9/20  Yes Fito Ram MD   potassium chloride (K-DUR,KLOR-CON) 20 MEQ CR tablet Take 60 mEq by mouth Every Morning.   Yes Ella Andres MD   potassium chloride (K-DUR,KLOR-CON) 20 MEQ CR tablet Take 40 mEq by mouth Every Evening.   Yes Ella Andres MD   pregabalin (LYRICA) 100 MG capsule Take 1 capsule by mouth 3 (Three) Times a Day. 7/9/20  Yes Fito Ram MD   sertraline (ZOLOFT) 50 MG tablet Take 50 mg by mouth Every Evening.   Yes Ella Andres MD   temazepam (RESTORIL) 30 MG capsule TAKE 1 CAPSULE BY MOUTH AT NIGHT AS NEEDED FOR SLEEP. 8/25/20  Yes Dmitry  "MD Ella   traZODone (DESYREL) 50 MG tablet Take 1 tablet by mouth Every Night. 9/21/20  Yes Noemy Queen MD   triamterene-hydrochlorothiazide (DYAZIDE) 37.5-25 MG per capsule TAKE 1 CAPSULE BY MOUTH EVERY DAY 6/22/20  Yes Valencia Cadena APRN   warfarin (COUMADIN) 5 MG tablet Take 5.5 mg by mouth Daily.   Yes ProviderElla MD   acetaminophen (TYLENOL) 500 MG tablet Take 1,000 mg by mouth Every 6 (Six) Hours As Needed for Mild Pain .    Provider, MD Ella   loperamide (Imodium A-D) 2 MG tablet Take 1 tablet by mouth 3 (Three) Times a Day As Needed for Diarrhea. 7/9/20   Siobhan Staley APRN   ondansetron ODT (ZOFRAN-ODT) 8 MG disintegrating tablet Place 1 tablet under the tongue Every 8 (Eight) Hours As Needed for Nausea or Vomiting. 8/28/20   Cindy Ball MD   KLOR-CON 20 MEQ CR tablet Take 3 tablets by mouth every morning and take 2 tablets by mouth at bedtime. 6/19/20 9/30/20  Valencia Cadena APRN   sertraline (ZOLOFT) 50 MG tablet TAKE 1 TABLET BY MOUTH EVERY EVENING BEFORE BED 6/22/20 9/30/20  Valencia Cadena APRN   Syringe 23G X 1\" 3 ML misc INJECT 1 ML B-12 ONCE MONTHLY 1/2/20 9/30/20  Valencia Cadena APRN   warfarin (COUMADIN) 1 MG tablet Take 1 tablet by mouth Daily. 7/10/20 9/30/20  Cindy Ball MD   warfarin (COUMADIN) 5 MG tablet Take 1 tablet by mouth Daily. Active thrombosis  Indications: DVT/PE (active thrombosis) 9/14/20 9/30/20  Cindy Ball MD       Allergies:    Allergies   Allergen Reactions   • Morphine Nausea Only and Hives   • Penicillin V Potassium Rash   • Sulfa Antibiotics Rash   • Levaquin [Levofloxacin] Nausea Only and Dizziness     When taken with Coumadin   • Amoxicillin Rash   • Norco [Hydrocodone-Acetaminophen] Rash       Objective   Objective     Vital Signs  Temp:  [98.2 °F (36.8 °C)-98.3 °F (36.8 °C)] 98.3 °F (36.8 °C)  Heart Rate:  [49-76] 54  Resp:  [11-16] 11  BP: (114-134)/(61-71) 125/65  SpO2:  [97 %-100 " %] 97 %  on   ;   Device (Oxygen Therapy): room air  Body mass index is 30.12 kg/m².    Physical Exam  Vitals signs and nursing note reviewed.   Constitutional:       General: She is not in acute distress.     Appearance: She is well-developed. She is obese. She is not diaphoretic.   HENT:      Head: Normocephalic and atraumatic.      Comments: Left jar tender to palpation     Nose: Nose normal.      Mouth/Throat:      Pharynx: No oropharyngeal exudate.   Eyes:      Extraocular Movements: Extraocular movements intact.      Conjunctiva/sclera: Conjunctivae normal.      Pupils: Pupils are equal, round, and reactive to light.   Neck:      Musculoskeletal: Normal range of motion.   Cardiovascular:      Rate and Rhythm: Normal rate and regular rhythm.      Heart sounds: Normal heart sounds.      Comments: S1, S2 audible.  Pulmonary:      Effort: Pulmonary effort is normal. No respiratory distress.      Breath sounds: Normal breath sounds. No wheezing or rales.      Comments: On room air.  Abdominal:      General: Bowel sounds are normal. There is no distension.      Palpations: Abdomen is soft.      Tenderness: There is no abdominal tenderness. There is no guarding or rebound.   Musculoskeletal: Normal range of motion.         General: No tenderness or deformity.   Skin:     General: Skin is warm.      Findings: No erythema or rash.   Neurological:      General: No focal deficit present.      Mental Status: She is alert and oriented to person, place, and time. Mental status is at baseline.      Cranial Nerves: No cranial nerve deficit.      Sensory: No sensory deficit.      Motor: No weakness.      Coordination: Coordination normal.   Psychiatric:         Mood and Affect: Mood normal.         Behavior: Behavior normal.         Results Review:  I have personally reviewed most recent cardiac tracings, lab results and radiology images and interpretations and agree with findings.    Results from last 7 days   Lab Units  09/30/20 2036 09/30/20  1650   WBC 10*3/mm3  --  4.70   HEMOGLOBIN g/dL 10.6* 10.8*   HEMATOCRIT % 33.2* 33.5*   PLATELETS 10*3/mm3  --  224   INR   --  7.51*     Results from last 7 days   Lab Units 09/30/20 2036 09/30/20 1931 09/30/20  1650   SODIUM mmol/L  --   --  139   POTASSIUM mmol/L  --   --  4.0   CHLORIDE mmol/L  --   --  103   CO2 mmol/L  --   --  26.0   BUN   --   --  17   CREATININE mg/dL  --   --  1.02*   GLUCOSE mg/dL  --   --  152*   CALCIUM mg/dL  --   --  9.5   ALT (SGPT) U/L  --   --  11   AST (SGOT) U/L  --   --  20   TROPONIN T ng/mL <0.010 <0.010 <0.010     Estimated Creatinine Clearance: 58.2 mL/min (A) (by C-G formula based on SCr of 1.02 mg/dL (H)).  Brief Urine Lab Results  (Last result in the past 365 days)      Color   Clarity   Blood   Leuk Est   Nitrite   Protein   CREAT   Urine HCG        12/30/19 1527 Yellow Slightly Cloudy Trace Small (1+) Negative Negative               Microbiology Results (last 10 days)     ** No results found for the last 240 hours. **          ECG/EMG Results (most recent)     Procedure Component Value Units Date/Time    ECG 12 Lead [[id number]] Collected: 09/30/20 1640     Updated: 09/30/20 1643    Narrative:      HEART RATE= 65  bpm  RR Interval= 928  ms  IA Interval= 156  ms  P Horizontal Axis= -5  deg  P Front Axis= 63  deg  QRSD Interval= 90  ms  QT Interval= 410  ms  QRS Axis= -4  deg  T Wave Axis= 33  deg  - BORDERLINE ECG -  Sinus rhythm  Probable left atrial enlargement  When compared with ECG of 30-Aug-2019 17:34:37,  Significant axis, voltage or hypertrophy change  Electronically Signed By:   Date and Time of Study: 2020-09-30 16:40:03               Results for orders placed during the hospital encounter of 01/13/20   Adult Transthoracic Echo Complete W/ Cont if Necessary Per Protocol    Narrative · Left ventricular systolic function is normal.          Ct Head Without Contrast    Result Date: 9/30/2020   1. Slightly hyperdense masses in the left  frontal precentral gyrus and the left cerebellar hemisphere, most consistent with intracranial metastatic disease, with mild associated vasogenic edema in the left cerebellar hemisphere. 2. Mild nonspecific partial opacification of the left mastoid air cells, possible simple effusion versus simple mastoiditis.  Electronically Signed By-Rusty Julian On:9/30/2020 6:31 PM This report was finalized on [id number] by  Rusty Julian, .    Xr Chest 1 View    Result Date: 9/30/2020  Limited study demonstrating chronic changes with no active disease. Lung nodule seen on the prior CT study are too small to visualize on radiographs.  Electronically Signed By-Rusty Julian On:9/30/2020 5:54 PM This report was finalized on [id number] by  Rusty Julian, .        Estimated Creatinine Clearance: 58.2 mL/min (A) (by C-G formula based on SCr of 1.02 mg/dL (H)).    Assessment/Plan   Assessment/Plan       Active Hospital Problems    Diagnosis  POA   • **Poisoning by warfarin sodium [T45.511A]  Yes   • History of DVT (deep vein thrombosis) [Z86.718]  Not Applicable   • Hematochezia [K92.1]  Yes   • Jaw pain [R68.84]  Yes   • Obesity (BMI 30-39.9) [E66.9]  Yes   • Depression [F32.9]  Yes   • Anemia [D64.9]  Yes   • Brain metastases (CMS/HCC) [C79.31]  Yes   • Malignant neoplasm of right ovary (CMS/HCC) [C56.1]  Yes   • Essential hypertension [I10]  Yes   • Insomnia [G47.00]  Yes   • Hyperlipidemia [E78.5]  Yes      Resolved Hospital Problems   No resolved problems to display.       Left-sided jaw pain  -Possibly secondary to metastatic disease to brain, rule out cardiac cause, versus other cause  - initial troponin negative    -Chest x-ray shows limited study demonstrating chronic changes with no active disease.  Lung nodule seen on the prior CT study are too small to visualize on radiographs.    -CT head shows slightly hyperdense masses in the left frontal precentral gyrus and left cerebral hemisphere, most consistent with intracranial  metastatic disease, with mild associated vasogenic edema in the left cerebral hemisphere.  Mild nonspecific partial opacification of the left mastoid air cells, possible simple effusion versus simple mastoiditis.    -EKG shows sinus rhythm 65 bpm, probable atrial enlargement, no ST elevation apparent.    -Patient is afebrile, pulse in the 70s, on room air oxygen and 98% SPO2 and blood pressure normotensive.    -Patient given aspirin and vitamin K 5 mg in ED.  -Neurosurgery consult  -Oncology consult, Dr. Ball  -Start Decadron 6 mg twice daily  -Pain control with home Roxicodone, inspect verified  -Check serial troponins  -Continuous cardiac monitoring  -Heart healthy diet    Supratherapeutic INR, history of DVT  -INR elevated at 7.51, PTT elevated at 75.  -Given vitamin K 5 mg in ED  -Hold home warfarin    Hematochezia  -Patient denies history of hemorrhoids  -Type and screen, check serial H&H  -Start IV Protonix  -Consider GI consult inpatient versus outpatient    Normocytic normochromic anemia, chronic, stable  -Hemoglobin low at 10.8 which was 10.0 about 2 weeks ago.  MCV of 93.3, MCH of 30.0.   -Monitor H&H    Ovarian cancer with metastatic disease to brain  -Review CT scan above  -Oncology consulted, Dr. Ball    Anxiety/depression/insomnia  -Continue home Restoril, Zoloft, trazodone, Lyrica    Essential hypertension, controlled  -Continue home Dyazide    Hyperlipidemia  -Continue home statin    Obesity, BMI 30.12  -Encourage lifestyle modifications      VTE Prophylaxis -warfarin  Mechanical Order History:     None      Pharmalogical Order History:     None          CODE STATUS:    Code Status and Medical Interventions:   Ordered at: 09/30/20 2025     Code Status:    CPR     Medical Interventions (Level of Support Prior to Arrest):    Full       This patient has been examined wearing appropriate Personal Protective Equipment and discussed with hospital infection control department. 10/01/20      I discussed  the patient's findings and my recommendations with patient.        Electronically signed by JCAI Contreras, 09/30/20, 8:00 PM EDT.  [facility] Hospitalist Team

## 2020-10-01 NOTE — PLAN OF CARE
Goal Outcome Evaluation:         Pt plan to go to [facility]. INR 1.3.  Drawn from chest port. MD is coordinating with [facility] to get a bed.  Patient is resting abed. Dinner is in front of her and she is consuming her meal.  Appears in good spirits.  No complaints voiced. Stated that the pain in her jaw has lessened. Now its just a dull ache.

## 2020-10-01 NOTE — NURSING NOTE
Patient high with INR.  Dr Trivedi stated he was waiting to correct INR til he found out if patient having surgery. 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  She needs to have scans of her jaw.  Also pt was asked if she wanted care to resume with Dr Hartman or Dr Koroma . She stated that she wanted to continue with Dr Hartman.  So She is wanting the INR corrected before transfer to [facility].  Dr Trivedi notified.  Patient is resting abed and is aware of plan to transfer when INR Is corrected.

## 2020-10-01 NOTE — PLAN OF CARE
OT eval attempted, however pt INR at 7.51. Will discuss with RN to see when re-draw will be. OT to f/u if time allows and when pt INR is appropriate.    Genoveva Zapata, OTPABLO, OTR

## 2020-10-02 ENCOUNTER — READMISSION MANAGEMENT (OUTPATIENT)
Dept: CALL CENTER | Facility: HOSPITAL | Age: 65
End: 2020-10-02

## 2020-10-02 VITALS
TEMPERATURE: 97.8 F | DIASTOLIC BLOOD PRESSURE: 68 MMHG | OXYGEN SATURATION: 99 % | HEIGHT: 65 IN | SYSTOLIC BLOOD PRESSURE: 133 MMHG | BODY MASS INDEX: 30.12 KG/M2 | RESPIRATION RATE: 15 BRPM | WEIGHT: 180.78 LBS | HEART RATE: 53 BPM

## 2020-10-02 PROBLEM — H70.92 MASTOIDITIS OF LEFT SIDE: Status: ACTIVE | Noted: 2020-10-02

## 2020-10-02 PROBLEM — T45.511A POISONING BY WARFARIN SODIUM: Status: RESOLVED | Noted: 2020-09-30 | Resolved: 2020-10-02

## 2020-10-02 PROBLEM — K92.1 HEMATOCHEZIA: Status: RESOLVED | Noted: 2020-10-01 | Resolved: 2020-10-02

## 2020-10-02 PROBLEM — R68.84 JAW PAIN: Status: RESOLVED | Noted: 2020-10-01 | Resolved: 2020-10-02

## 2020-10-02 LAB
ANION GAP SERPL CALCULATED.3IONS-SCNC: 11 MMOL/L (ref 5–15)
BASOPHILS # BLD AUTO: 0 10*3/MM3 (ref 0–0.2)
BASOPHILS NFR BLD AUTO: 0.2 % (ref 0–1.5)
BUN SERPL-MCNC: 26 MG/DL (ref 8–23)
BUN SERPL-MCNC: ABNORMAL MG/DL
BUN/CREAT SERPL: ABNORMAL
CALCIUM SPEC-SCNC: 9.1 MG/DL (ref 8.6–10.5)
CHLORIDE SERPL-SCNC: 106 MMOL/L (ref 98–107)
CO2 SERPL-SCNC: 25 MMOL/L (ref 22–29)
CREAT SERPL-MCNC: 1 MG/DL (ref 0.57–1)
DEPRECATED RDW RBC AUTO: 59.5 FL (ref 37–54)
EOSINOPHIL # BLD AUTO: 0 10*3/MM3 (ref 0–0.4)
EOSINOPHIL NFR BLD AUTO: 0 % (ref 0.3–6.2)
ERYTHROCYTE [DISTWIDTH] IN BLOOD BY AUTOMATED COUNT: 18.3 % (ref 12.3–15.4)
GFR SERPL CREATININE-BSD FRML MDRD: 56 ML/MIN/1.73
GLUCOSE SERPL-MCNC: 134 MG/DL (ref 65–99)
HCT VFR BLD AUTO: 31.7 % (ref 34–46.6)
HGB BLD-MCNC: 10.1 G/DL (ref 12–15.9)
LYMPHOCYTES # BLD AUTO: 0.7 10*3/MM3 (ref 0.7–3.1)
LYMPHOCYTES NFR BLD AUTO: 9.2 % (ref 19.6–45.3)
MAGNESIUM SERPL-MCNC: 1.6 MG/DL (ref 1.6–2.4)
MCH RBC QN AUTO: 29.8 PG (ref 26.6–33)
MCHC RBC AUTO-ENTMCNC: 32 G/DL (ref 31.5–35.7)
MCV RBC AUTO: 93.2 FL (ref 79–97)
MONOCYTES # BLD AUTO: 0.5 10*3/MM3 (ref 0.1–0.9)
MONOCYTES NFR BLD AUTO: 6.9 % (ref 5–12)
NEUTROPHILS NFR BLD AUTO: 6 10*3/MM3 (ref 1.7–7)
NEUTROPHILS NFR BLD AUTO: 83.7 % (ref 42.7–76)
NRBC BLD AUTO-RTO: 0.1 /100 WBC (ref 0–0.2)
PLATELET # BLD AUTO: 228 10*3/MM3 (ref 140–450)
PMV BLD AUTO: 9 FL (ref 6–12)
POTASSIUM SERPL-SCNC: 4.1 MMOL/L (ref 3.5–5.2)
RBC # BLD AUTO: 3.4 10*6/MM3 (ref 3.77–5.28)
SODIUM SERPL-SCNC: 142 MMOL/L (ref 136–145)
WBC # BLD AUTO: 7.1 10*3/MM3 (ref 3.4–10.8)

## 2020-10-02 PROCEDURE — 83735 ASSAY OF MAGNESIUM: CPT | Performed by: PHYSICIAN ASSISTANT

## 2020-10-02 PROCEDURE — 80048 BASIC METABOLIC PNL TOTAL CA: CPT | Performed by: HOSPITALIST

## 2020-10-02 PROCEDURE — 97162 PT EVAL MOD COMPLEX 30 MIN: CPT

## 2020-10-02 PROCEDURE — 96366 THER/PROPH/DIAG IV INF ADDON: CPT

## 2020-10-02 PROCEDURE — 99217 PR OBSERVATION CARE DISCHARGE MANAGEMENT: CPT | Performed by: HOSPITALIST

## 2020-10-02 PROCEDURE — 25010000002 DEXAMETHASONE PER 1 MG: Performed by: PHYSICIAN ASSISTANT

## 2020-10-02 PROCEDURE — 99215 OFFICE O/P EST HI 40 MIN: CPT | Performed by: INTERNAL MEDICINE

## 2020-10-02 PROCEDURE — 85025 COMPLETE CBC W/AUTO DIFF WBC: CPT | Performed by: HOSPITALIST

## 2020-10-02 PROCEDURE — G0378 HOSPITAL OBSERVATION PER HR: HCPCS

## 2020-10-02 PROCEDURE — 96376 TX/PRO/DX INJ SAME DRUG ADON: CPT

## 2020-10-02 RX ORDER — DEXAMETHASONE 4 MG/1
4 TABLET ORAL 2 TIMES DAILY WITH MEALS
Qty: 10 TABLET | Refills: 0 | Status: SHIPPED | OUTPATIENT
Start: 2020-10-02 | End: 2020-10-07

## 2020-10-02 RX ORDER — CEFDINIR 300 MG/1
300 CAPSULE ORAL EVERY 12 HOURS SCHEDULED
Status: DISCONTINUED | OUTPATIENT
Start: 2020-10-02 | End: 2020-10-02 | Stop reason: HOSPADM

## 2020-10-02 RX ORDER — CEFDINIR 300 MG/1
300 CAPSULE ORAL EVERY 12 HOURS SCHEDULED
Qty: 13 CAPSULE | Refills: 0 | Status: SHIPPED | OUTPATIENT
Start: 2020-10-02 | End: 2020-10-09

## 2020-10-02 RX ADMIN — DEXAMETHASONE SODIUM PHOSPHATE 6 MG: 4 INJECTION, SOLUTION INTRAMUSCULAR; INTRAVENOUS at 08:44

## 2020-10-02 RX ADMIN — CLINDAMYCIN PHOSPHATE 600 MG: 600 INJECTION, SOLUTION INTRAVENOUS at 08:45

## 2020-10-02 RX ADMIN — TRIAMTERENE AND HYDROCHLOROTHIAZIDE 1 TABLET: 37.5; 25 TABLET ORAL at 08:44

## 2020-10-02 RX ADMIN — MAGNESIUM GLUCONATE 500 MG ORAL TABLET 400 MG: 500 TABLET ORAL at 08:43

## 2020-10-02 RX ADMIN — PANTOPRAZOLE SODIUM 40 MG: 40 INJECTION, POWDER, FOR SOLUTION INTRAVENOUS at 06:15

## 2020-10-02 RX ADMIN — PREGABALIN 100 MG: 100 CAPSULE ORAL at 08:44

## 2020-10-02 RX ADMIN — POTASSIUM CHLORIDE 60 MEQ: 1500 TABLET, EXTENDED RELEASE ORAL at 08:44

## 2020-10-02 RX ADMIN — CEFDINIR 300 MG: 300 CAPSULE ORAL at 13:00

## 2020-10-02 RX ADMIN — Medication 10 ML: at 08:44

## 2020-10-02 RX ADMIN — CLINDAMYCIN PHOSPHATE 600 MG: 600 INJECTION, SOLUTION INTRAVENOUS at 01:31

## 2020-10-02 NOTE — DISCHARGE SUMMARY
[facility] Medicine Services  DISCHARGE SUMMARY        Prepared For PCP:  Noemy Queen MD    Patient Name: Tahira Zimmerman  : 1955  MRN: [id number]      Date of Admission:   2020    Date of Discharge:  10/2/2020    Length of stay:  LOS: 0 days     Hospital Course     Presenting Problem:   Jaw pain [R68.84]  Poisoning by warfarin sodium, accidental or unintentional, initial encounter [T45.511A]      Active Hospital Problems    Diagnosis  POA   • Mastoiditis of left side [H70.92]  Yes     Priority: High   • Malignant neoplasm of right ovary (CMS/HCC) [C56.1]  Yes     Priority: High   • History of DVT (deep vein thrombosis) [Z86.718]  Not Applicable     Priority: Medium   • Obesity (BMI 30-39.9) [E66.9]  Yes     Priority: Medium   • Depression [F32.9]  Yes     Priority: Medium   • Anemia [D64.9]  Yes     Priority: Medium   • Brain metastases (CMS/HCC) [C79.31]  Yes     Priority: Medium   • Essential hypertension [I10]  Yes     Priority: Medium   • Insomnia [G47.00]  Yes     Priority: Medium   • Hyperlipidemia [E78.5]  Yes     Priority: Medium      Resolved Hospital Problems    Diagnosis Date Resolved POA   • **Poisoning by warfarin sodium [T45.511A] 10/02/2020 Yes     Priority: High   • Jaw pain [R68.84] 10/02/2020 Yes     Priority: High   • Hematochezia [K92.1] 10/02/2020 Yes     Priority: Medium           Hospital Course:       Left-sided jaw pain: Resolved  -left sided mastoiditis  -Denies history of TMJ or temporal arteritis  -s/p CT head in the ED  -Neurosurgery consulted  -discharge on decadron and omnicef  -Follow-up with PCP for further management      Supratherapeutic INR,   -INR elevated at 7.51  -Given vitamin K in ED  -Restarted Coumadin        history of DVT:  -On chronic Coumadin     Hematochezia:  -No evidence of bleeding during hospitalization     Normocytic normochromic anemia:  -H&H stable     Ovarian cancer with metastatic disease to brain  -Recently  seen in the office by neurosurgery  at [facility] 9/18/20  -Oncology consulted, Dr. Ball  -Radiation oncology, Dr. Cisneros at [facility]  -Neurosurgeon Dr. Hartman at [facility]     Anxiety/depression/insomnia  -Continue home Restoril, Zoloft, trazodone, Lyrica     Essential hypertension:  -Continue home Dyazide     Hyperlipidemia  -Continue home statin    Recommendation for Outpatient Providers:       The patient may benefit from ENT referral if she continues to have trouble      Reasons For Change In Medications and Indications for New Medications:        Day of Discharge     HPI:     The patient is a 65 y.o. female with metastatic ovarian cancer to the brain and DVT on chronic anticoagulation with Coumadin that presented to the emergency room on 9/30/20 complaining of about 3 days of worsening left sided facial pain that is worse with chewing and opening of mouth.  The patient follows with neurosurgeon, Dr. Hartman and has had recent work-up with MRI of the brain at [facility].  The patient denies recent fevers, chills, sweats, trauma to the face, nausea, vomiting or abdominal pain.     In the ED, INR was 7.51, hemoglobin 10.8 and chest x-ray ruled out pneumonia or effusions.  CT of the head without contrast showed masses in the left frontal precentral gyrus and left cerebellar hemisphere associated with vasogenic edema.  The patient was given vitamin K in the ED.       Vital Signs:   Temp:  [97.4 °F (36.3 °C)-98.2 °F (36.8 °C)] 97.8 °F (36.6 °C)  Heart Rate:  [48-69] 53  Resp:  [12-16] 15  BP: ()/(44-68) 133/68     Physical Exam:  Physical Exam  HENT:      Head: Normocephalic and atraumatic.   Eyes:      Pupils: Pupils are equal, round, and reactive to light.   Neck:      Musculoskeletal: Normal range of motion.   Cardiovascular:      Rate and Rhythm: Normal rate and regular rhythm.   Pulmonary:      Effort: Pulmonary effort is normal.      Breath sounds: Normal breath sounds.   Abdominal:      General: Bowel sounds are normal.       Palpations: Abdomen is soft.   Musculoskeletal: Normal range of motion.   Skin:     General: Skin is warm and dry.   Neurological:      General: No focal deficit present.      Mental Status: She is alert and oriented to person, place, and time.   Psychiatric:         Mood and Affect: Mood normal.         Pertinent  and/or Most Recent Results     Results from last 7 days   Lab Units 10/02/20  0618 10/01/20  0239 09/30/20 2036 09/30/20  1650   WBC 10*3/mm3 7.10 4.00  --  4.70   HEMOGLOBIN g/dL 10.1* 11.1* 10.6* 10.8*   HEMATOCRIT % 31.7* 34.0 33.2* 33.5*   PLATELETS 10*3/mm3 228 211  --  224   SODIUM mmol/L 142 141  --  139   POTASSIUM mmol/L 4.1 4.5  --  4.0   CHLORIDE mmol/L 106 104  --  103   CO2 mmol/L 25.0 27.0  --  26.0   BUN  26* 18  --  17   CREATININE mg/dL 1.00 1.02*  --  1.02*   GLUCOSE mg/dL 134* 147*  --  152*   CALCIUM mg/dL 9.1 9.4  --  9.5     Results from last 7 days   Lab Units 10/01/20  1536 09/30/20  1650   BILIRUBIN mg/dL  --  0.2   ALK PHOS U/L  --  150*   ALT (SGPT) U/L  --  11   AST (SGOT) U/L  --  20   PROTIME Seconds 14.7* 75.0*   INR  1.36* 7.51*           Invalid input(s): TG, LDLCALC, LDLREALC  Results from last 7 days   Lab Units 10/01/20  1536 10/01/20  0742 10/01/20  0239 09/30/20  2036 09/30/20  1931   TSH uIU/mL  --  0.543  --   --   --    TROPONIN T ng/mL <0.010 <0.010 <0.010 <0.010 <0.010       Brief Urine Lab Results  (Last result in the past 365 days)      Color   Clarity   Blood   Leuk Est   Nitrite   Protein   CREAT   Urine HCG        12/30/19 1527 Yellow Slightly Cloudy Trace Small (1+) Negative Negative               Microbiology Results Abnormal     None          Ct Head Without Contrast    Result Date: 9/30/2020  Impression:  1. Slightly hyperdense masses in the left frontal precentral gyrus and the left cerebellar hemisphere, most consistent with intracranial metastatic disease, with mild associated vasogenic edema in the left cerebellar hemisphere. 2. Mild  nonspecific partial opacification of the left mastoid air cells, possible simple effusion versus simple mastoiditis.  Electronically Signed By-Rusty Julian On:9/30/2020 6:31 PM This report was finalized on [id number] by  Harshal Marroquin    Xr Chest 1 View    Result Date: 9/30/2020  Impression: Limited study demonstrating chronic changes with no active disease. Lung nodule seen on the prior CT study are too small to visualize on radiographs.  Electronically Signed By-Rusty Julian On:9/30/2020 5:54 PM This report was finalized on [id number] by  Rusty Julian .                Results for orders placed during the hospital encounter of 01/13/20   Adult Transthoracic Echo Complete W/ Cont if Necessary Per Protocol    Narrative · Left ventricular systolic function is normal.                  Test Results Pending at Discharge        Procedures Performed           Consults:   Consults     Date and Time Order Name Status Description    10/1/2020 1132 Inpatient Neurology Consult General      9/30/2020 2025 Inpatient Hematology & Oncology Consult      9/30/2020 2025 Inpatient Neurosurgery Consult      9/30/2020 1747 Hospitalist (on-call MD unless specified) Completed             Discharge Details        Discharge Medications      New Medications      Instructions Start Date   cefdinir 300 MG capsule  Commonly known as: OMNICEF   300 mg, Oral, Every 12 Hours Scheduled      dexamethasone 4 MG tablet  Commonly known as: Decadron   4 mg, Oral, 2 Times Daily With Meals         Continue These Medications      Instructions Start Date   acetaminophen 500 MG tablet  Commonly known as: TYLENOL   1,000 mg, Oral, Every 6 Hours PRN      atorvastatin 20 MG tablet  Commonly known as: LIPITOR   20 mg, Oral, Every Night at Bedtime      cyanocobalamin 1000 MCG/ML injection   1,000 mcg, Intramuscular, Every 28 Days      famotidine 40 MG tablet  Commonly known as: PEPCID   TAKE 1 TABLET BY MOUTH EVERY MORNING BEFORE BREAKFAST      loperamide 2  MG tablet  Commonly known as: Imodium A-D   2 mg, Oral, 3 Times Daily PRN      magnesium oxide 400 MG tablet  Commonly known as: MAG-OX   TAKE 1 TABLET BY MOUTH TWICE A DAY      ondansetron ODT 8 MG disintegrating tablet  Commonly known as: ZOFRAN-ODT   8 mg, Sublingual, Every 8 Hours PRN      oxyCODONE 10 MG tablet  Commonly known as: ROXICODONE   10 mg, Oral, 3 Times Daily PRN      potassium chloride 20 MEQ CR tablet  Commonly known as: K-DUR,KLOR-CON   60 mEq, Oral, Every Morning      potassium chloride 20 MEQ CR tablet  Commonly known as: K-DUR,KLOR-CON   40 mEq, Oral, Every Evening      pregabalin 100 MG capsule  Commonly known as: LYRICA   100 mg, Oral, 3 Times Daily      sertraline 50 MG tablet  Commonly known as: ZOLOFT   50 mg, Oral, Every Evening      temazepam 30 MG capsule  Commonly known as: RESTORIL   TAKE 1 CAPSULE BY MOUTH AT NIGHT AS NEEDED FOR SLEEP.      traZODone 50 MG tablet  Commonly known as: DESYREL   50 mg, Oral, Nightly      triamterene-hydrochlorothiazide 37.5-25 MG per capsule  Commonly known as: DYAZIDE   TAKE 1 CAPSULE BY MOUTH EVERY DAY      warfarin 5 MG tablet  Commonly known as: COUMADIN   5.5 mg, Oral, Daily Warfarin             Allergies   Allergen Reactions   • Morphine Nausea Only and Hives   • Penicillin V Potassium Rash   • Sulfa Antibiotics Rash   • Levaquin [Levofloxacin] Nausea Only and Dizziness     When taken with Coumadin   • Amoxicillin Rash   • Norco [Hydrocodone-Acetaminophen] Rash         Discharge Disposition:  Home or Self Care    Diet:  Hospital:  Diet Order   Procedures   • Diet Cardiac; Healthy Heart         Discharge Activity:         CODE STATUS:    Code Status and Medical Interventions:   Ordered at: 09/30/20 2025     Code Status:    CPR     Medical Interventions (Level of Support Prior to Arrest):    Full         Follow-up Appointments  Future Appointments   Date Time Provider Department Center   10/5/2020  1:00 PM LAB [facility] ONC LAB [facility]      10/5/2020  1:15 PM RN REVIEW ROOM  [facility]   10/8/2020  1:45 PM Fito Ram MD [facility]       Additional Instructions for the Follow-ups that You Need to Schedule     Discharge Follow-up with PCP   As directed       Currently Documented PCP:    Noemy Queen MD    PCP Phone Number:    [phone]     Follow Up Details: 2 weeks                 Condition on Discharge:      Stable      This patient has been examined wearing appropriate Personal Protective Equipment . 10/02/20    Electronically signed by Viraj Flower DO, 10/02/20, 2:43 PM EDT.    Time: I spent  20  minutes on this discharge activity which included face-to-face encounter with the patient/reviewing the data in the system/coordination of the care with the nursing staff as well as consultants/documentation/entering orders.

## 2020-10-02 NOTE — CONSULTS
Hematology/Oncology Inpatient Consultation    Patient name: Tahira Zimmerman  : 1955  MRN: [id number]  Referring Provider: Rolo Salazar PA   Reason for Consultation: metastatic disease to brain, hx of ovarian cancer, currently on chemo    Chief complaint: Left-sided facial pain and swelling    History of present illness:    Tahira Zimmerman is a 65 y.o. female who presented to [facility] on 2020 with complaints of left-sided facial pain and swelling for 3 days that is worse with movement.  In the ED, patient rated her pain 7 out of 10.  The patient also reported bright red blood in her stool for 2 days.  In the ED, labs showed elevated INR of 7.51, hemoglobin 10.8, creatinine 1.02.  Patient's home warfarin dose was held.  CT of the head performed in the ED showed slightly hyperdense masses in the left frontal precentral gyrus and the left cerebral hemisphere, most consistent with intracranial metastatic disease, with mild associated vasogenic edema in the left cerebral hemisphere.  Mild nonspecific partial opacification of the left mastoid air cells, possible simple effusion versus simple mastoiditis.  Neurosurgery was consulted.    10/02/20  Hematology/Oncology was consulted for metastatic disease to the brain, history of ovarian cancer currently on chemotherapy.  Patient is followed by Dr.Ifeoma Ball for recurrent ovarian cancer.  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  Patient was treated with surgery and given carboplatin and Taxol.  6 months later patient had recurrence of disease and was treated with intra-abdominal peritoneal chemotherapy.  In 2013 patient felt a mass in the left side of her abdomen.  Patient was admitted for surgery for recurrent ovarian cancer and had exploratory laparotomy, omentectomy, bowel resection, liver biopsy, and appendectomy.  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  Patient started Taxol and carboplatin in 2013.  She has also been administered Gemzar single agent niraparib, and was on Doxil and carboplatinum.  Doxil was discontinued due to protein lifetime dosing.  In July 2020 patient brain MRI at that showed brain metastasis.  Patient was started on topotecan single agent.    She  has a past medical history of Anemia (2013), Cervical disc disorder (2013), Clotting disorder (CMS/HCC) (2013), Colon polyp (2013), Deep vein thrombosis (CMS/HCC) (2013), Diverticulosis (2013), GERD (gastroesophageal reflux disease) (2016), HL (hearing loss) (2016), Hyperlipidemia (2013), Hypertension, Joint pain (2013), Low back pain (2013), Neuromuscular disorder (CMS/HCC) (2015), Osteopenia (2014), Osteoporosis, Ovarian cancer (CMS/HCC), Pneumonia (2010), Spinal stenosis (2013), and Urinary tract infection (2013).    PCP: Noemy Queen MD    History:  Past Medical History:   Diagnosis Date   • Anemia 2013   • Cervical disc disorder 2013    Herniated disc, pinched nerve   • Clotting disorder (CMS/HCC) 2013    Low platelets from chemo   • Colon polyp 2013   • Deep vein thrombosis (CMS/HCC) 2013   • Diverticulosis 2013   • GERD (gastroesophageal reflux disease) 2016   • HL (hearing loss) 2016    I need hearing aids   • Hyperlipidemia 2013    Need my cholesterol rechecked   • Hypertension    • Joint pain 2013    Shoulder pain, torn rotator cuff   • Low back pain 2013   • Neuromuscular disorder (CMS/HCC) 2015    Shingles, pinched nerves in my neck   • Osteopenia 2014   • Osteoporosis    • Ovarian cancer (CMS/HCC)     ovarian   • Pneumonia 2010    Have had it several times   • Spinal stenosis 2013    Cervical   • Urinary tract infection 2013    Have had several utis   ,   Past Surgical History:   Procedure Laterality Date   • COLON SURGERY     • COLONOSCOPY   05/26/2018   • EXPLORATORY LAPAROTOMY     • HERNIA REPAIR     • HYSTERECTOMY     • OOPHORECTOMY     ,   Family History   Problem Relation Age of Onset   • Hypertension Mother    • Cancer Father    • Hypertension Father    • Hypertension Sister    • Hypertension Brother    • Stroke Brother    ,   Social History     Tobacco Use   • Smoking status: Never Smoker   • Smokeless tobacco: Never Used   Substance Use Topics   • Alcohol use: No     Frequency: Never     Comment: caffeine 32-64oz qd   • Drug use: No   ,   Medications Prior to Admission   Medication Sig Dispense Refill Last Dose   • atorvastatin (LIPITOR) 20 MG tablet Take 20 mg by mouth every night at bedtime.  3 9/29/2020 at Unknown time   • cyanocobalamin 1000 MCG/ML injection Inject 1,000 mcg into the appropriate muscle as directed by prescriber Every 28 (Twenty-Eight) Days.   Past Month at Unknown time   • famotidine (PEPCID) 40 MG tablet TAKE 1 TABLET BY MOUTH EVERY MORNING BEFORE BREAKFAST 90 tablet 1 9/30/2020 at Unknown time   • magnesium oxide (MAG-OX) 400 MG tablet TAKE 1 TABLET BY MOUTH TWICE A  tablet 1 9/30/2020 at Unknown time   • oxyCODONE (ROXICODONE) 10 MG tablet Take 1 tablet by mouth 3 (Three) Times a Day As Needed for Moderate Pain . 90 tablet 0 9/30/2020 at Unknown time   • potassium chloride (K-DUR,KLOR-CON) 20 MEQ CR tablet Take 60 mEq by mouth Every Morning.   9/30/2020 at Unknown time   • potassium chloride (K-DUR,KLOR-CON) 20 MEQ CR tablet Take 40 mEq by mouth Every Evening.   9/29/2020 at Unknown time   • pregabalin (LYRICA) 100 MG capsule Take 1 capsule by mouth 3 (Three) Times a Day. 90 capsule 2 9/30/2020 at Unknown time   • sertraline (ZOLOFT) 50 MG tablet Take 50 mg by mouth Every Evening.   9/29/2020 at Unknown time   • temazepam (RESTORIL) 30 MG capsule TAKE 1 CAPSULE BY MOUTH AT NIGHT AS NEEDED FOR SLEEP.   9/29/2020 at Unknown time   • traZODone (DESYREL) 50 MG tablet Take 1 tablet by mouth Every Night. 30 tablet 1  9/29/2020 at Unknown time   • triamterene-hydrochlorothiazide (DYAZIDE) 37.5-25 MG per capsule TAKE 1 CAPSULE BY MOUTH EVERY DAY 90 capsule 1 9/30/2020 at Unknown time   • warfarin (COUMADIN) 5 MG tablet Take 5.5 mg by mouth Daily.   9/29/2020 at Unknown time   • acetaminophen (TYLENOL) 500 MG tablet Take 1,000 mg by mouth Every 6 (Six) Hours As Needed for Mild Pain .      • loperamide (Imodium A-D) 2 MG tablet Take 1 tablet by mouth 3 (Three) Times a Day As Needed for Diarrhea. 60 tablet 2    • ondansetron ODT (ZOFRAN-ODT) 8 MG disintegrating tablet Place 1 tablet under the tongue Every 8 (Eight) Hours As Needed for Nausea or Vomiting. 30 tablet 3    , Scheduled Meds:  atorvastatin, 20 mg, Oral, Nightly  cefdinir, 300 mg, Oral, Q12H  cyanocobalamin, 1,000 mcg, Intramuscular, Q28 Days  dexamethasone, 6 mg, Intravenous, BID  magnesium oxide, 400 mg, Oral, BID  pantoprazole, 40 mg, Intravenous, Q AM  potassium chloride, 40 mEq, Oral, Q PM  potassium chloride, 60 mEq, Oral, QAM  pregabalin, 100 mg, Oral, TID  sertraline, 50 mg, Oral, Q PM  sodium chloride, 10 mL, Intravenous, Q12H  traZODone, 50 mg, Oral, Nightly  triamterene-hydrochlorothiazide, 1 tablet, Oral, Daily    , Continuous Infusions:   , PRN Meds:  •  acetaminophen **OR** acetaminophen **OR** acetaminophen  •  aluminum-magnesium hydroxide-simethicone  •  bisacodyl  •  loperamide  •  magnesium hydroxide  •  magnesium sulfate **OR** magnesium sulfate in D5W 1g/100mL (PREMIX)  •  melatonin  •  nitroglycerin  •  ondansetron **OR** ondansetron  •  oxyCODONE  •  potassium chloride  •  sodium chloride  •  sodium chloride  •  temazepam   Allergies:  Morphine, Penicillin v potassium, Sulfa antibiotics, Levaquin [levofloxacin], Amoxicillin, and Norco [hydrocodone-acetaminophen]    Subjective     ROS: (perfomed by MD)  Review of Systems   Constitutional: Negative for activity change, chills, fatigue and fever.   HENT: Negative for ear pain, mouth sores, nosebleeds and  "sore throat.         Facial pain and swelling   Eyes: Negative for photophobia, discharge and visual disturbance.   Respiratory: Negative for chest tightness, shortness of breath, wheezing and stridor.    Cardiovascular: Negative for chest pain and palpitations.   Gastrointestinal: Negative for abdominal pain, blood in stool, diarrhea, nausea and vomiting.   Endocrine: Negative for cold intolerance and heat intolerance.   Genitourinary: Negative.  Negative for dysuria and hematuria.   Musculoskeletal: Negative.  Negative for joint swelling and neck stiffness.   Skin: Negative.  Negative for color change and rash.   Neurological: Negative for dizziness, seizures, syncope, facial asymmetry and light-headedness.        Left-sided facial pain.   Hematological: Negative for adenopathy.        No obvious bleeding   Psychiatric/Behavioral: Negative for agitation, confusion and hallucinations. The patient is not nervous/anxious.         Objective   Vital Signs:   /68 (BP Location: Right arm, Patient Position: Lying)   Pulse 53   Temp 97.8 °F (36.6 °C) (Oral)   Resp 15   Ht 165.1 cm (65\")   Wt 82 kg (180 lb 12.4 oz)   LMP  (LMP Unknown)   SpO2 99%   Breastfeeding No   BMI 30.08 kg/m²     Physical Exam:   Physical Exam  Vitals signs and nursing note reviewed.   Constitutional:       General: She is not in acute distress.     Appearance: Normal appearance. She is obese. She is not diaphoretic.   HENT:      Head: Normocephalic and atraumatic.   Eyes:      General: No scleral icterus.        Right eye: No discharge.         Left eye: No discharge.      Conjunctiva/sclera: Conjunctivae normal.   Neck:      Musculoskeletal: Normal range of motion and neck supple.      Thyroid: No thyromegaly.   Cardiovascular:      Rate and Rhythm: Normal rate and regular rhythm.      Heart sounds: Normal heart sounds. No friction rub. No gallop.    Pulmonary:      Effort: Pulmonary effort is normal. No respiratory distress.      " Breath sounds: No stridor. No wheezing.   Abdominal:      General: Bowel sounds are normal.      Palpations: Abdomen is soft. There is no mass.      Tenderness: There is no abdominal tenderness. There is no guarding or rebound.   Musculoskeletal: Normal range of motion.         General: No tenderness.   Lymphadenopathy:      Cervical: No cervical adenopathy.   Skin:     General: Skin is warm.      Findings: No erythema or rash.   Neurological:      Mental Status: She is alert and oriented to person, place, and time.      Motor: No abnormal muscle tone.   Psychiatric:         Mood and Affect: Mood normal.         Behavior: Behavior normal.         Thought Content: Thought content normal.         Results Review:  Lab Results (last 48 hours)     Procedure Component Value Units Date/Time    BUN [[id number]]  (Abnormal) Collected: 10/02/20 0618    Specimen: Blood Updated: 10/02/20 1050     BUN 26 mg/dL     Magnesium [[id number]]  (Normal) Collected: 10/02/20 0618    Specimen: Blood Updated: 10/02/20 0751     Magnesium 1.6 mg/dL     Basic Metabolic Panel [[id number]]  (Abnormal) Collected: 10/02/20 0618    Specimen: Blood Updated: 10/02/20 0751     Glucose 134 mg/dL      BUN --     Comment: Testing performed by alternate method        Creatinine 1.00 mg/dL      Sodium 142 mmol/L      Potassium 4.1 mmol/L      Chloride 106 mmol/L      CO2 25.0 mmol/L      Calcium 9.1 mg/dL      eGFR Non African Amer 56 mL/min/1.73      BUN/Creatinine Ratio --     Comment: Testing not performed        Anion Gap 11.0 mmol/L     Narrative:      GFR Normal >60  Chronic Kidney Disease <60  Kidney Failure <15      CBC & Differential [[id number]]  (Abnormal) Collected: 10/02/20 0618    Specimen: Blood Updated: 10/02/20 0733    Narrative:      The following orders were created for panel order CBC & Differential.  Procedure                               Abnormality         Status                     ---------                               -----------          ------                     CBC Auto Differential[[id number]]        Abnormal            Final result                 Please view results for these tests on the individual orders.    CBC Auto Differential [[id number]]  (Abnormal) Collected: 10/02/20 0618    Specimen: Blood Updated: 10/02/20 0733     WBC 7.10 10*3/mm3      RBC 3.40 10*6/mm3      Hemoglobin 10.1 g/dL      Hematocrit 31.7 %      MCV 93.2 fL      MCH 29.8 pg      MCHC 32.0 g/dL      RDW 18.3 %      RDW-SD 59.5 fl      MPV 9.0 fL      Platelets 228 10*3/mm3      Neutrophil % 83.7 %      Lymphocyte % 9.2 %      Monocyte % 6.9 %      Eosinophil % 0.0 %      Basophil % 0.2 %      Neutrophils, Absolute 6.00 10*3/mm3      Lymphocytes, Absolute 0.70 10*3/mm3      Monocytes, Absolute 0.50 10*3/mm3      Eosinophils, Absolute 0.00 10*3/mm3      Basophils, Absolute 0.00 10*3/mm3      nRBC 0.1 /100 WBC     Protime-INR [[id number]]  (Abnormal) Collected: 10/01/20 1536    Specimen: Blood Updated: 10/01/20 1707     Protime 14.7 Seconds      INR 1.36    Troponin [[id number]]  (Normal) Collected: 10/01/20 1536    Specimen: Blood Updated: 10/01/20 1617     Troponin T <0.010 ng/mL     Narrative:      Troponin T Reference Range:  <= 0.03 ng/mL-   Negative for AMI  >0.03 ng/mL-     Abnormal for myocardial necrosis.  Clinicians would have to utilize clinical acumen, EKG, Troponin and serial changes to determine if it is an Acute Myocardial Infarction or myocardial injury due to an underlying chronic condition.       Results may be falsely decreased if patient taking Biotin.      T4, Free [[id number]]  (Abnormal) Collected: 10/01/20 0742    Specimen: Blood Updated: 10/01/20 1150     Free T4 0.90 ng/dL     Narrative:      Results may be falsely increased if patient taking Biotin.      Vitamin B12 [[id number]]  (Abnormal) Collected: 10/01/20 0804    Specimen: Blood Updated: 10/01/20 1100     Vitamin B-12 >2,000 pg/mL     Narrative:      Results may be falsely increased if  patient taking Biotin.      Sedimentation Rate [[id number]]  (Abnormal) Collected: 10/01/20 0804    Specimen: Blood Updated: 10/01/20 0914     Sed Rate 73 mm/hr     TSH [[id number]]  (Normal) Collected: 10/01/20 0742    Specimen: Blood Updated: 10/01/20 0833     TSH 0.543 uIU/mL     Troponin [[id number]]  (Normal) Collected: 10/01/20 0742    Specimen: Blood Updated: 10/01/20 0810     Troponin T <0.010 ng/mL     Narrative:      Troponin T Reference Range:  <= 0.03 ng/mL-   Negative for AMI  >0.03 ng/mL-     Abnormal for myocardial necrosis.  Clinicians would have to utilize clinical acumen, EKG, Troponin and serial changes to determine if it is an Acute Myocardial Infarction or myocardial injury due to an underlying chronic condition.       Results may be falsely decreased if patient taking Biotin.      BUN [[id number]]  (Normal) Collected: 10/01/20 0239    Specimen: Blood Updated: 10/01/20 0716     BUN 18 mg/dL     Basic Metabolic Panel [[id number]]  (Abnormal) Collected: 10/01/20 0239    Specimen: Blood Updated: 10/01/20 0403     Glucose 147 mg/dL      BUN --     Comment: Testing performed by alternate method        Creatinine 1.02 mg/dL      Sodium 141 mmol/L      Potassium 4.5 mmol/L      Chloride 104 mmol/L      CO2 27.0 mmol/L      Calcium 9.4 mg/dL      eGFR Non African Amer 54 mL/min/1.73      BUN/Creatinine Ratio --     Comment: Testing not performed        Anion Gap 10.0 mmol/L     Narrative:      GFR Normal >60  Chronic Kidney Disease <60  Kidney Failure <15      Troponin [[id number]]  (Normal) Collected: 10/01/20 0239    Specimen: Blood Updated: 10/01/20 0403     Troponin T <0.010 ng/mL     Narrative:      Troponin T Reference Range:  <= 0.03 ng/mL-   Negative for AMI  >0.03 ng/mL-     Abnormal for myocardial necrosis.  Clinicians would have to utilize clinical acumen, EKG, Troponin and serial changes to determine if it is an Acute Myocardial Infarction or myocardial injury due to an underlying chronic  condition.       Results may be falsely decreased if patient taking Biotin.      Magnesium [[id number]]  (Normal) Collected: 10/01/20 0239    Specimen: Blood Updated: 10/01/20 0403     Magnesium 1.6 mg/dL     CBC Auto Differential [[id number]]  (Abnormal) Collected: 10/01/20 0239    Specimen: Blood Updated: 10/01/20 0342     WBC 4.00 10*3/mm3      RBC 3.66 10*6/mm3      Hemoglobin 11.1 g/dL      Hematocrit 34.0 %      MCV 93.0 fL      MCH 30.2 pg      MCHC 32.5 g/dL      RDW 18.4 %      RDW-SD 60.4 fl      MPV 9.0 fL      Platelets 211 10*3/mm3      Neutrophil % 82.2 %      Lymphocyte % 13.0 %      Monocyte % 4.0 %      Eosinophil % 0.4 %      Basophil % 0.4 %      Neutrophils, Absolute 3.30 10*3/mm3      Lymphocytes, Absolute 0.50 10*3/mm3      Monocytes, Absolute 0.20 10*3/mm3      Eosinophils, Absolute 0.00 10*3/mm3      Basophils, Absolute 0.00 10*3/mm3      nRBC 0.5 /100 WBC     Troponin [[id number]]  (Normal) Collected: 09/30/20 2036    Specimen: Blood Updated: 09/30/20 2115     Troponin T <0.010 ng/mL     Narrative:      Troponin T Reference Range:  <= 0.03 ng/mL-   Negative for AMI  >0.03 ng/mL-     Abnormal for myocardial necrosis.  Clinicians would have to utilize clinical acumen, EKG, Troponin and serial changes to determine if it is an Acute Myocardial Infarction or myocardial injury due to an underlying chronic condition.       Results may be falsely decreased if patient taking Biotin.      Hemoglobin & Hematocrit, Blood [[id number]]  (Abnormal) Collected: 09/30/20 2036    Specimen: Blood Updated: 09/30/20 2052     Hemoglobin 10.6 g/dL      Hematocrit 33.2 %     Troponin [[id number]]  (Normal) Collected: 09/30/20 1931    Specimen: Blood Updated: 09/30/20 1959     Troponin T <0.010 ng/mL     Narrative:      Troponin T Reference Range:  <= 0.03 ng/mL-   Negative for AMI  >0.03 ng/mL-     Abnormal for myocardial necrosis.  Clinicians would have to utilize clinical acumen, EKG, Troponin and serial changes to  determine if it is an Acute Myocardial Infarction or myocardial injury due to an underlying chronic condition.       Results may be falsely decreased if patient taking Biotin.      BUN [[id number]]  (Normal) Collected: 09/30/20 1650    Specimen: Blood Updated: 09/30/20 1734     BUN 17 mg/dL     Comprehensive Metabolic Panel [[id number]]  (Abnormal) Collected: 09/30/20 1650    Specimen: Blood Updated: 09/30/20 1732     Glucose 152 mg/dL      BUN --     Comment: Testing performed by alternate method        Creatinine 1.02 mg/dL      Sodium 139 mmol/L      Potassium 4.0 mmol/L      Chloride 103 mmol/L      CO2 26.0 mmol/L      Calcium 9.5 mg/dL      Total Protein 6.7 g/dL      Albumin 3.90 g/dL      ALT (SGPT) 11 U/L      AST (SGOT) 20 U/L      Alkaline Phosphatase 150 U/L      Total Bilirubin 0.2 mg/dL      eGFR Non African Amer 54 mL/min/1.73      Globulin 2.8 gm/dL      A/G Ratio 1.4 g/dL      BUN/Creatinine Ratio --     Comment: Testing not performed        Anion Gap 10.0 mmol/L     Narrative:      GFR Normal >60  Chronic Kidney Disease <60  Kidney Failure <15      Troponin [[id number]]  (Normal) Collected: 09/30/20 1650    Specimen: Blood Updated: 09/30/20 1732     Troponin T <0.010 ng/mL     Narrative:      Troponin T Reference Range:  <= 0.03 ng/mL-   Negative for AMI  >0.03 ng/mL-     Abnormal for myocardial necrosis.  Clinicians would have to utilize clinical acumen, EKG, Troponin and serial changes to determine if it is an Acute Myocardial Infarction or myocardial injury due to an underlying chronic condition.       Results may be falsely decreased if patient taking Biotin.      Protime-INR [[id number]]  (Abnormal) Collected: 09/30/20 1650    Specimen: Blood Updated: 09/30/20 1720     Protime 75.0 Seconds      INR 7.51    CBC & Differential [[id number]]  (Abnormal) Collected: 09/30/20 1650    Specimen: Blood Updated: 09/30/20 1657    Narrative:      The following orders were created for panel order CBC &  Differential.  Procedure                               Abnormality         Status                     ---------                               -----------         ------                     CBC Auto Differential[[id number]]        Abnormal            Final result                 Please view results for these tests on the individual orders.    CBC Auto Differential [[id number]]  (Abnormal) Collected: 09/30/20 1650    Specimen: Blood Updated: 09/30/20 1657     WBC 4.70 10*3/mm3      RBC 3.60 10*6/mm3      Hemoglobin 10.8 g/dL      Hematocrit 33.5 %      MCV 93.3 fL      MCH 30.0 pg      MCHC 32.1 g/dL      RDW 18.1 %      RDW-SD 59.1 fl      MPV 8.5 fL      Platelets 224 10*3/mm3      Neutrophil % 68.4 %      Lymphocyte % 19.2 %      Monocyte % 8.0 %      Eosinophil % 3.6 %      Basophil % 0.8 %      Neutrophils, Absolute 3.20 10*3/mm3      Lymphocytes, Absolute 0.90 10*3/mm3      Monocytes, Absolute 0.40 10*3/mm3      Eosinophils, Absolute 0.20 10*3/mm3      Basophils, Absolute 0.00 10*3/mm3      nRBC 0.2 /100 WBC            Pending Results: None    Imaging Reviewed:   Ct Head Without Contrast    Result Date: 9/30/2020   1. Slightly hyperdense masses in the left frontal precentral gyrus and the left cerebellar hemisphere, most consistent with intracranial metastatic disease, with mild associated vasogenic edema in the left cerebellar hemisphere. 2. Mild nonspecific partial opacification of the left mastoid air cells, possible simple effusion versus simple mastoiditis.  Electronically Signed By-Rusty Julian On:9/30/2020 6:31 PM This report was finalized on [id number] by  Harshal Marroquin    Xr Chest 1 View    Result Date: 9/30/2020  Limited study demonstrating chronic changes with no active disease. Lung nodule seen on the prior CT study are too small to visualize on radiographs.  Electronically Signed By-Rusty Julian On:9/30/2020 5:54 PM This report was finalized on [id number] by  Rusty Julian .                Assessment/Plan   ASSESSMENT  1. Recurrent ovarian cancer with mets to brain: Receiving single agent topotecan.  Followed by radiation oncologist Dr. Sandra at [facility] and neurosurgeon Dr. Hartman at [facility].  2. Anemia: Related to chemotherapy.  Stable at this time.  3. Left-sided jaw pain: left-sided mastoiditis.  Neurosurgery consulted.  Plan to discharge on Decadron and Omnicef.  4. Supratherapeutic INR: INR 7.51 in ED.  Given vitamin K.  Coumadin restarted.  5. History of DVT: On Coumadin  6. Anxiety/depression/insomnia: Managed per primary team  7. HTN/HLD: Managed per primary team    PLAN  1. CBC daily  2. Monitor INR closely  3. Continue warfarin  4. Plan to follow-up with Dr. Ball in 1 week    Electronically signed by IRENA Fuchs, 10/02/20, 4:14 PM EDT.        I personally reviewed all pertinent labs, related documentation and the  imaging. ROS performed and physical exam done during face to face enounter with patient. I agree with  nurse practitioner's doumentation, assessment and plan. No new changes.    Patient with history of metastatic ovarian cancer with extensive treatment history, presents to the hospital with symptoms of left-sided facial pain.  She is followed by .  also noted to have high INR and anemia.  Most recent treatment is topotecan.  Status post vitamin K for high INR.  CT of the head performed in the ED showed slightly hyperdense masses in the left frontal precentral gyrus and the left cerebral hemisphere, most consistent with intracranial metastatic disease, with mild associated vasogenic edema in the left cerebral hemisphere.    Patient needs to be seen by radiation oncology, to consider radiation for the left frontal and left cerebellar region brain lesions..  She is to be seen by radiation oncologist at [facility].  Today she expressed interest in being seen locally at radiation center here.  [place]  I  discussed with Dr. Delong and he is willing to see the patient in clinic on Monday if patient is interested.    Electronically signed by Arie Enrique MD, 10/03/20, 4:57 PM EDT.

## 2020-10-02 NOTE — THERAPY EVALUATION
Patient Name: Tahira Zimmerman  : 1955    MRN: [id number]                              Today's Date: 10/2/2020       Admit Date: 2020    Visit Dx:     ICD-10-CM ICD-9-CM   1. Poisoning by warfarin sodium, accidental or unintentional, initial encounter  T45.511A 964.2     E858.2   2. Jaw pain  R68.84 784.92     Patient Active Problem List   Diagnosis   • Malignant neoplasm of right ovary (CMS/HCC)   • Hypomagnesemia   • Left upper extremity deep vein thrombosis (CMS/HCC)   • Pernicious anemia   • Malabsorption due to intolerance, not elsewhere classified   • MELANI (iron deficiency anemia)   • Pancytopenia due to antineoplastic chemotherapy (CMS/HCC)   • Encounter for antineoplastic chemotherapy   • Long term (current) use of anticoagulants   • Monitoring for long-term anticoagulant use   • Leg pain, bilateral   • Osteoarthritis of cervical spine without myelopathy   • Other long term (current) drug therapy   • Pain   • Hyperlipidemia   • Essential hypertension   • Antineoplastic chemotherapy induced anemia   • Cervical disc disorder with radiculopathy   • Restless leg syndrome   • Dysuria   • Fibromyositis   • Port-A-Cath in place   • Encounter for medication management   • Chronic headaches   • Welcome to Medicare preventive visit   • Encounter for screening mammogram for breast cancer   • Postmenopausal   • Screening for diabetes mellitus   • Screening, ischemic heart disease   • Encounter for hepatitis C screening test for low risk patient   • Need for immunization against influenza   • Poisoning by warfarin sodium   • Anxiety   • Brain metastases (CMS/HCC)   • DVT (deep venous thrombosis) (CMS/HCC)   • Family history of cerebrovascular accident (CVA)   • Family history of diabetes mellitus   • Insomnia   • Lung nodule   • Mass of skin   • Osteoporosis   • Shingles   • History of DVT (deep vein thrombosis)   • Hematochezia   • Jaw pain   • Obesity (BMI 30-39.9)   • Depression   • Anemia     Past  Medical History:   Diagnosis Date   • Anemia 2013   • Cervical disc disorder 2013    Herniated disc, pinched nerve   • Clotting disorder (CMS/HCC) 2013    Low platelets from chemo   • Colon polyp 2013   • Deep vein thrombosis (CMS/HCC) 2013   • Diverticulosis 2013   • GERD (gastroesophageal reflux disease) 2016   • HL (hearing loss) 2016    I need hearing aids   • Hyperlipidemia 2013    Need my cholesterol rechecked   • Hypertension    • Joint pain 2013    Shoulder pain, torn rotator cuff   • Low back pain 2013   • Neuromuscular disorder (CMS/[facility]) 2015    Shingles, pinched nerves in my neck   • Osteopenia 2014   • Osteoporosis    • Ovarian cancer (CMS/[facility])     ovarian   • Pneumonia 2010    Have had it several times   • Spinal stenosis 2013    Cervical   • Urinary tract infection 2013    Have had several utis     Past Surgical History:   Procedure Laterality Date   • COLON SURGERY     • COLONOSCOPY  05/26/2018   • EXPLORATORY LAPAROTOMY     • HERNIA REPAIR     • HYSTERECTOMY     • OOPHORECTOMY       General Information     Row Name 10/02/20 1119          Physical Therapy Time and Intention    Document Type  evaluation  -EL     Mode of Treatment  physical therapy;individual therapy  -EL     Row Name 10/02/20 1119          General Information    Patient Profile Reviewed  yes  -EL     Prior Level of Function  independent:;ADL's;all household mobility;driving  -EL     Existing Precautions/Restrictions  fall  -EL     Row Name 10/02/20 1119          Living Environment    Lives With  child(quinn), adult  -EL     Row Name 10/02/20 1119          Home Main Entrance    Number of Stairs, Main Entrance  two  -EL     Row Name 10/02/20 1119          Stairs Within Home, Primary    Number of Stairs, Within Home, Primary  ten  -EL     Row Name 10/02/20 1119          Cognition    Orientation Status (Cognition)  oriented x 4  -EL     Row Name 10/02/20 1119          Safety Issues, Functional Mobility    Safety Issues Affecting Function  (Mobility)  awareness of need for assistance  -EL     Impairments Affecting Function (Mobility)  balance;strength  -EL       User Key  (r) = Recorded By, (t) = Taken By, (c) = Cosigned By    Initials Name Provider Type    Leroy Rivera, PT Physical Therapist        Mobility     Row Name 10/02/20 1123          Bed Mobility    Bed Mobility  bed mobility (all) activities  -EL     All Activities, [place] (Bed Mobility)  modified independence  -EL     Row Name 10/02/20 1123          Bed-Chair Transfer    Bed-Chair [place] (Transfers)  not tested  -EL     Row Name 10/02/20 1123          Sit-Stand Transfer    Sit-Stand [place] (Transfers)  contact guard  -EL     Row Name 10/02/20 1123          Gait/Stairs (Locomotion)    [place] Level (Gait)  minimum assist (75% patient effort);contact guard  -EL     Assistive Device (Gait)  walker, front-wheeled  -EL     Distance in Feet (Gait)  ambulated 25 feet with MIN A for balance recovery without RWx, 25 feet with CGA with RWx  -EL     Bilateral Gait Deviations  heel strike decreased  -EL     Comment (Gait/Stairs)  noted lateral sway  -EL       User Key  (r) = Recorded By, (t) = Taken By, (c) = Cosigned By    Initials Name Provider Type    Leroy Rivera, PT Physical Therapist        Obj/Interventions     Row Name 10/02/20 1130          Range of Motion Comprehensive    General Range of Motion  bilateral lower extremity ROM WFL  -EL     Row Name 10/02/20 1130          Strength Comprehensive (MMT)    General Manual Muscle Testing (MMT) Assessment  lower extremity strength deficits identified  -EL     Comment, General Manual Muscle Testing (MMT) Assessment  BLE 4/5 gross  -EL     Row Name 10/02/20 1130          Balance    Balance Assessment  sitting static balance;standing static balance;standing dynamic balance  -EL     Static Sitting Balance  WFL  -EL     Static Standing Balance  mild impairment  -EL     Dynamic Standing Balance  mild impairment  -EL     Row Name  10/02/20 1130          Sensory Assessment (Somatosensory)    Sensory Assessment (Somatosensory)  sensation intact  -EL       User Key  (r) = Recorded By, (t) = Taken By, (c) = Cosigned By    Initials Name Provider Type    Leroy Rivera PT Physical Therapist        Goals/Plan     Row Name 10/02/20 1139          Transfer Goal 1 (PT)    Activity/Assistive Device (Transfer Goal 1, PT)  transfers, all  -EL     [place] Level/Cues Needed (Transfer Goal 1, PT)  modified independence  -EL     Time Frame (Transfer Goal 1, PT)  long term goal (LTG);2 weeks  -EL     Row Name 10/02/20 1139          Gait Training Goal 1 (PT)    Activity/Assistive Device (Gait Training Goal 1, PT)  gait (walking locomotion);walker, rolling  -EL     [place] Level (Gait Training Goal 1, PT)  supervision required  -EL     Distance (Gait Training Goal 1, PT)  150  -EL     Time Frame (Gait Training Goal 1, PT)  long term goal (LTG);2 weeks  -EL     Row Name 10/02/20 1139          Stairs Goal 1 (PT)    Activity/Assistive Device (Stairs Goal 1, PT)  stairs, all skills  -EL     [place] Level/Cues Needed (Stairs Goal 1, PT)  supervision required  -EL     Number of Stairs (Stairs Goal 1, PT)  10  -EL     Time Frame (Stairs Goal 1, PT)  long term goal (LTG);2 weeks  -EL       User Key  (r) = Recorded By, (t) = Taken By, (c) = Cosigned By    Initials Name Provider Type    Leroy Rivera PT Physical Therapist        Clinical Impression     Row Name 10/02/20 1130          Pain    Additional Documentation  Pain Scale: Numbers Pre/Post-Treatment (Group)  -EL     Row Name 10/02/20 1130          Pain Scale: Numbers Pre/Post-Treatment    Pretreatment Pain Rating  0/10 - no pain  -EL     Posttreatment Pain Rating  0/10 - no pain  -EL     Row Name 10/02/20 1130          Plan of Care Review    Plan of Care Reviewed With  patient  -EL     Outcome Summary  Pt is a 64 YO F admitted with jaw pain, warfarin posioning, with history of metastatic disease to  brain. Pt reports living at home with daughter, no recent falls, ambulates without AD, and has a flight of stairs that she has to navigate at home. Pt performed bed moblity this date independently and ambulated with MIN A for balance recovery with c/o dizziness, BP assessed and was 130/61. Gait also assesed with RWx and ambulated with CGA. Pt maintains a falls risk, recommendation is IP rehab. Discussed with pt and likely to refuse. If refused pt will require HHPT and RWx usage at home.  -EL     Row Name 10/02/20 1130          Therapy Assessment/Plan (PT)    Rehab Potential (PT)  good, to achieve stated therapy goals  -EL     Criteria for Skilled Interventions Met (PT)  yes  -EL     Predicted Duration of Therapy Intervention (PT)  Until d/c  -EL       User Key  (r) = Recorded By, (t) = Taken By, (c) = Cosigned By    Initials Name Provider Type    Leroy Rivera PT Physical Therapist        Outcome Measures     Row Name 10/02/20 1141          How much help from another person do you currently need...    Turning from your back to your side while in flat bed without using bedrails?  4  -EL     Moving from lying on back to sitting on the side of a flat bed without bedrails?  4  -EL     Moving to and from a bed to a chair (including a wheelchair)?  3  -EL     Standing up from a chair using your arms (e.g., wheelchair, bedside chair)?  3  -EL     Climbing 3-5 steps with a railing?  3  -EL     To walk in hospital room?  3  -EL     AM-PAC 6 Clicks Score (PT)  20  -EL     Row Name 10/02/20 1141          Functional Assessment    Outcome Measure Options  AM-PAC 6 Clicks Basic Mobility (PT)  -EL       User Key  (r) = Recorded By, (t) = Taken By, (c) = Cosigned By    Initials Name Provider Type    Leroy Rivera PT Physical Therapist        Physical Therapy Education                 Title: PT OT SLP Therapies (In Progress)     Topic: Physical Therapy (In Progress)     Point: Mobility training (Done)     Learning Progress Summary            Patient Acceptance, E,TB, VU by  at 10/2/2020 1142                   Point: Home exercise program (Not Started)     Learner Progress:  Not documented in this visit.          Point: Body mechanics (Not Started)     Learner Progress:  Not documented in this visit.          Point: Precautions (Done)     Learning Progress Summary           Patient Acceptance, E,TB, VU by  at 10/2/2020 1142                               User Key     Initials Effective Dates Name Provider Type [facility] 06/23/20 -  Leroy Velasco PT Physical Therapist PT              PT Recommendation and Plan  Planned Therapy Interventions (PT): balance training, neuromuscular re-education, bed mobility training, gait training, transfer training, patient/family education, stair training, strengthening  Plan of Care Reviewed With: patient  Outcome Summary: Pt is a 66 YO F admitted with jaw pain, warfarin posioning, with history of metastatic disease to brain. Pt reports living at home with daughter, no recent falls, ambulates without AD, and has a flight of stairs that she has to navigate at home. Pt performed bed moblity this date independently and ambulated with MIN A for balance recovery with c/o dizziness, BP assessed and was 130/61. Gait also assesed with RWx and ambulated with CGA. Pt maintains a falls risk, recommendation is IP rehab. Discussed with pt and likely to refuse. If refused pt will require HHPT and RWx usage at home.     Time Calculation:   PT Charges     Row Name 10/02/20 1144             Time Calculation    Start Time  0857  -      Stop Time  0913  -      Time Calculation (min)  16 min  -EL      PT Received On  10/02/20  -      PT - Next Appointment  10/04/20  -      PT Goal Re-Cert Due Date  10/16/20  -        User Key  (r) = Recorded By, (t) = Taken By, (c) = Cosigned By    Initials Name Provider Type    Leroy Rivera, PT Physical Therapist        Therapy Charges for Today     Code Description Service Date  Service Provider Modifiers Qty    81386339011 HC PT EVAL MOD COMPLEXITY 3 10/2/2020 Leroy Velasco, PT GP 1          PT G-Codes  Outcome Measure Options: AM-PAC 6 Clicks Basic Mobility (PT)  AM-PAC 6 Clicks Score (PT): 20    Leroy Velasco, PT  10/2/2020

## 2020-10-02 NOTE — PLAN OF CARE
Problem: Adult Inpatient Plan of Care  Goal: Plan of Care Review  Outcome: Ongoing, Progressing  Flowsheets (Taken 10/2/2020 1130)  Plan of Care Reviewed With: patient  Outcome Summary: Pt is a 66 YO F admitted with jaw pain, warfarin posioning, with history of metastatic disease to brain. Pt reports living at home with daughter, no recent falls, ambulates without AD, and has a flight of stairs that she has to navigate at home. Pt performed bed moblity this date independently and ambulated with MIN A for balance recovery with c/o dizziness, BP assessed and was 130/61. Gait also assesed with RWx and ambulated with CGA. Pt maintains a falls risk, recommendation is IP rehab. Discussed with pt and likely to refuse. If refused pt will require HHPT and RWx usage at home.

## 2020-10-03 NOTE — OUTREACH NOTE
Prep Survey      Responses   [facility]?  [facility]   Is LACE score < 7 ?  No   Eligibility  [facility]   Hospital  [facility]   Date of Admission  09/30/20   Date of Discharge  10/02/20   Discharge Disposition  Home or Self Care   Discharge diagnosis  Mastoiditis   Does the patient have one of the following disease processes/diagnoses(primary or secondary)?  Other   Does the patient have Home health ordered?  No   Is there a DME ordered?  No   Prep survey completed?  Yes          Yesy Milan RN

## 2020-10-05 ENCOUNTER — TELEPHONE (OUTPATIENT)
Dept: ONCOLOGY | Facility: HOSPITAL | Age: 65
End: 2020-10-05

## 2020-10-05 ENCOUNTER — TRANSITIONAL CARE MANAGEMENT TELEPHONE ENCOUNTER (OUTPATIENT)
Dept: CALL CENTER | Facility: HOSPITAL | Age: 65
End: 2020-10-05

## 2020-10-05 ENCOUNTER — LAB (OUTPATIENT)
Dept: LAB | Facility: HOSPITAL | Age: 65
End: 2020-10-05

## 2020-10-05 ENCOUNTER — ANTICOAGULATION VISIT (OUTPATIENT)
Dept: ONCOLOGY | Facility: HOSPITAL | Age: 65
End: 2020-10-05

## 2020-10-05 ENCOUNTER — TELEPHONE (OUTPATIENT)
Dept: ONCOLOGY | Facility: CLINIC | Age: 65
End: 2020-10-05

## 2020-10-05 ENCOUNTER — HOSPITAL ENCOUNTER (OUTPATIENT)
Dept: ONCOLOGY | Facility: HOSPITAL | Age: 65
Setting detail: INFUSION SERIES
Discharge: HOME | End: 2020-10-05

## 2020-10-05 DIAGNOSIS — I82.722 CHRONIC DEEP VEIN THROMBOSIS (DVT) OF LEFT UPPER EXTREMITY, UNSPECIFIED VEIN (HCC): ICD-10-CM

## 2020-10-05 DIAGNOSIS — T45.1X5A PANCYTOPENIA DUE TO ANTINEOPLASTIC CHEMOTHERAPY (HCC): Primary | ICD-10-CM

## 2020-10-05 DIAGNOSIS — D61.810 PANCYTOPENIA DUE TO ANTINEOPLASTIC CHEMOTHERAPY (HCC): Primary | ICD-10-CM

## 2020-10-05 DIAGNOSIS — C56.1 MALIGNANT NEOPLASM OF RIGHT OVARY (HCC): ICD-10-CM

## 2020-10-05 DIAGNOSIS — D51.0 PERNICIOUS ANEMIA: ICD-10-CM

## 2020-10-05 LAB
BASOPHILS # BLD AUTO: 0.01 10*3/MM3 (ref 0–0.2)
BASOPHILS NFR BLD AUTO: 0.1 % (ref 0–1.5)
DEPRECATED RDW RBC AUTO: 56.1 FL (ref 37–54)
EOSINOPHIL # BLD AUTO: 0.01 10*3/MM3 (ref 0–0.4)
EOSINOPHIL NFR BLD AUTO: 0.1 % (ref 0.3–6.2)
ERYTHROCYTE [DISTWIDTH] IN BLOOD BY AUTOMATED COUNT: 16.9 % (ref 12.3–15.4)
HCT VFR BLD AUTO: 33.9 % (ref 34–46.6)
HGB BLD-MCNC: 10.8 G/DL (ref 12–15.9)
INR PPP: 1
LYMPHOCYTES # BLD AUTO: 1.12 10*3/MM3 (ref 0.7–3.1)
LYMPHOCYTES NFR BLD AUTO: 11.9 % (ref 19.6–45.3)
MCH RBC QN AUTO: 30.3 PG (ref 26.6–33)
MCHC RBC AUTO-ENTMCNC: 31.9 G/DL (ref 31.5–35.7)
MCV RBC AUTO: 95.2 FL (ref 79–97)
MONOCYTES # BLD AUTO: 0.69 10*3/MM3 (ref 0.1–0.9)
MONOCYTES NFR BLD AUTO: 7.3 % (ref 5–12)
NEUTROPHILS NFR BLD AUTO: 7.56 10*3/MM3 (ref 1.7–7)
NEUTROPHILS NFR BLD AUTO: 80.6 % (ref 42.7–76)
PLATELET # BLD AUTO: 238 10*3/MM3 (ref 140–450)
PMV BLD AUTO: 11.2 FL (ref 6–12)
RBC # BLD AUTO: 3.56 10*6/MM3 (ref 3.77–5.28)
WBC # BLD AUTO: 9.39 10*3/MM3 (ref 3.4–10.8)

## 2020-10-05 PROCEDURE — 85610 PROTHROMBIN TIME: CPT

## 2020-10-05 PROCEDURE — 85025 COMPLETE CBC W/AUTO DIFF WBC: CPT

## 2020-10-05 NOTE — PROGRESS NOTES
Case Management Discharge Note      Final Note: Per PT notes IP rehab recommended and patient refused, was not managed by CM over the weekend and no home health was set up for patient. Patient was a routine home.       Final Discharge Disposition Code: 01 - home or self-care

## 2020-10-05 NOTE — TELEPHONE ENCOUNTER
Called patient to schedule chemo.  No answer.  Message left with date and time and asked for return call to confirm.

## 2020-10-05 NOTE — OUTREACH NOTE
Call Center TCM Note      Responses   [facility] [place] patient discharged from?  [facility]   Does the patient have one of the following disease processes/diagnoses(primary or secondary)?  Other   TCM attempt successful?  No   Unsuccessful attempts  Attempt 2          Carlee Yee LPN    10/5/2020, 18:02 EDT

## 2020-10-05 NOTE — OUTREACH NOTE
Call Center TCM Note      Responses   [facility] patient discharged from?  [facility]   Does the patient have one of the following disease processes/diagnoses(primary or secondary)?  Other   TCM attempt successful?  No   Unsuccessful attempts  Attempt 1          Carlee Yee LPN    10/5/2020, 15:18 EDT

## 2020-10-06 ENCOUNTER — TRANSITIONAL CARE MANAGEMENT TELEPHONE ENCOUNTER (OUTPATIENT)
Dept: CALL CENTER | Facility: HOSPITAL | Age: 65
End: 2020-10-06

## 2020-10-06 NOTE — OUTREACH NOTE
Call Center TCM Note      Responses   [facility] patient discharged from?  [facility]   Does the patient have one of the following disease processes/diagnoses(primary or secondary)?  Other   TCM attempt successful?  Yes   Call start time  0949   Call end time  0952   Discharge diagnosis  Mastoiditis   Is patient permission given to speak with other caregiver?  No   Meds reviewed with patient/caregiver?  Yes   Does the patient have all medications ordered at discharge?  Yes   Is the patient taking all medications as directed (includes completed medication regime)?  Yes   Does the patient have a primary care provider?   Yes   Does the patient have an appointment with their PCP within 7 days of discharge?  No   Comments regarding PCP  PCP Jeremiah Queen. No hospital follow up in place at this time.    What is preventing the patient from scheduling follow up appointments within 7 days of discharge?  -- [No appts showing available for hospital follow up 1-2 weeks post discharge with PCP. ]   Nursing Interventions  -- [Message routed to PCP office with appt need. ]   Has the patient kept scheduled appointments due by today?  N/A   Comments  Patient reports that she needs appt with Dr Ball. She states that she needs upcoming cataract surgery and needs consult regarding blood thinner and chemo treatment prior to surgery.    Has home health visited the patient within 72 hours of discharge?  N/A   Psychosocial issues?  No   Did the patient receive a copy of their discharge instructions?  Yes   Nursing interventions  Reviewed instructions with patient   What is the patient's perception of their health status since discharge?  Improving   Is the patient/caregiver able to teach back signs and symptoms related to disease process for when to call PCP?  Yes   Is the patient/caregiver able to teach back signs and symptoms related to disease process for when to call 911?  Yes   Is the patient/caregiver able to teach back the  hierarchy of who to call/visit for symptoms/problems? PCP, Specialist, Home health nurse, Urgent Care, ED, 911  Yes   TCM call completed?  Yes          Amada Mayberry RN    10/6/2020, 09:52 EDT

## 2020-10-07 ENCOUNTER — TELEPHONE (OUTPATIENT)
Dept: ONCOLOGY | Facility: CLINIC | Age: 65
End: 2020-10-07

## 2020-10-07 ENCOUNTER — DOCUMENTATION (OUTPATIENT)
Dept: ONCOLOGY | Facility: HOSPITAL | Age: 65
End: 2020-10-07

## 2020-10-07 NOTE — TELEPHONE ENCOUNTER
----- Message from Keri Hollis RN sent at 10/5/2020  2:15 PM EDT -----  Regarding: RE: Hospital F/u  I have scheduled chemo but still needs MD appointment  ----- Message -----  From: Siobhan Staley APRN  Sent: 10/2/2020   4:10 PM EDT  To: [facility]  Subject: Hospital F/u                                     Please schedule hospital follow-up appointment Dr. Ball in 1 week.  Patient also needs to be scheduled for chemotherapy.  Thank you.    Electronically signed by IRENA Fuchs, 10/02/20, 4:10 PM EDT.

## 2020-10-07 NOTE — TELEPHONE ENCOUNTER
LM LETTING PT KNOW THAT WE ADDED AN APPOINTMENT WITH DR [facility] ONTO HER PREVIOUSLY SCHEDULED LAB AND RX APPT BUT THAT IT DOESN'T CHANGE HER ARRIVAL TIME

## 2020-10-08 ENCOUNTER — OFFICE VISIT (OUTPATIENT)
Dept: PAIN MEDICINE | Facility: CLINIC | Age: 65
End: 2020-10-08

## 2020-10-08 VITALS
HEART RATE: 76 BPM | DIASTOLIC BLOOD PRESSURE: 69 MMHG | WEIGHT: 185 LBS | SYSTOLIC BLOOD PRESSURE: 118 MMHG | OXYGEN SATURATION: 98 % | BODY MASS INDEX: 30.82 KG/M2 | TEMPERATURE: 98.4 F | RESPIRATION RATE: 16 BRPM | HEIGHT: 65 IN

## 2020-10-08 DIAGNOSIS — M79.604 LEG PAIN, BILATERAL: ICD-10-CM

## 2020-10-08 DIAGNOSIS — M79.605 LEG PAIN, BILATERAL: ICD-10-CM

## 2020-10-08 DIAGNOSIS — M50.10 CERVICAL DISC DISORDER WITH RADICULOPATHY: ICD-10-CM

## 2020-10-08 DIAGNOSIS — R52 PAIN: Primary | ICD-10-CM

## 2020-10-08 DIAGNOSIS — M79.7 FIBROMYALGIA: ICD-10-CM

## 2020-10-08 DIAGNOSIS — M47.812 OSTEOARTHRITIS OF CERVICAL SPINE WITHOUT MYELOPATHY: ICD-10-CM

## 2020-10-08 DIAGNOSIS — M79.7 FIBROMYOSITIS: ICD-10-CM

## 2020-10-08 DIAGNOSIS — G25.81 RESTLESS LEG SYNDROME: ICD-10-CM

## 2020-10-08 DIAGNOSIS — Z79.899 OTHER LONG TERM (CURRENT) DRUG THERAPY: ICD-10-CM

## 2020-10-08 PROCEDURE — G0463 HOSPITAL OUTPT CLINIC VISIT: HCPCS | Performed by: PHYSICAL MEDICINE & REHABILITATION

## 2020-10-08 PROCEDURE — 99213 OFFICE O/P EST LOW 20 MIN: CPT | Performed by: PHYSICAL MEDICINE & REHABILITATION

## 2020-10-08 RX ORDER — OXYCODONE HYDROCHLORIDE 10 MG/1
10 TABLET ORAL 3 TIMES DAILY PRN
Qty: 90 TABLET | Refills: 0 | Status: SHIPPED | OUTPATIENT
Start: 2020-10-08 | End: 2020-12-30 | Stop reason: SDUPTHER

## 2020-10-08 RX ORDER — OXYCODONE HYDROCHLORIDE 10 MG/1
10 TABLET ORAL 3 TIMES DAILY PRN
Qty: 90 TABLET | Refills: 0 | Status: SHIPPED | OUTPATIENT
Start: 2020-10-08 | End: 2020-10-08 | Stop reason: SDUPTHER

## 2020-10-08 RX ORDER — PREGABALIN 100 MG/1
100 CAPSULE ORAL 3 TIMES DAILY
Qty: 90 CAPSULE | Refills: 2 | Status: SHIPPED | OUTPATIENT
Start: 2020-10-08 | End: 2021-01-12 | Stop reason: SDUPTHER

## 2020-10-09 ENCOUNTER — ANTICOAGULATION VISIT (OUTPATIENT)
Dept: ONCOLOGY | Facility: HOSPITAL | Age: 65
End: 2020-10-09

## 2020-10-09 NOTE — PROGRESS NOTES
Hematology/Oncology Outpatient Follow Up    PATIENT NAME:Tahira Zimmerman  :1955  MRN: [id number]  PRIMARY CARE PHYSICIAN: Noemy Queen MD  REFERRING PHYSICIAN: Noemy Queen MD    Chief Complaint   Patient presents with   • Follow-up     malignant neoplasm of right ovary, pernicious anemia        HISTORY OF PRESENT ILLNESS:     1. Recurrent ovarian cancer diagnosis established in 2013.  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  The patient was treated with surgery and then postoperatively with adjuvant chemotherapy consisting of Carboplatin and Taxol.  Six months later, she had recurrence of disease intra-abdominally between the rectum and vagina, which was treated with intraabdominal peritoneal chemotherapy.  She had been free of disease since that time.  She reported feeling a mass in the left side of her abdomen approximately six months ago.  This gradually grew and in early 2013 she had her daughter feel the mass.  The daughter works for Dr. David Rogers and the patient at that time also complained of intermittent rectal bleeding.  Consultation with Dr. David Rogers was obtained.  The patient had a CT scan of the abdomen and pelvis performed on 13 revealing left lower quadrant mass measuring 9.7 x 9.3 x 6 cm demonstrating areas of dense calcification, soft tissue density and cystic density within it.  Stromal tumor is felt to be most likely a possibly fibroma.  It is generated with necrosis and calcification.  Possible palpable mass in the rectus muscle is seen with calcification and deformity of the rectus muscle and just below this a second mass intra-abdominally was seen measuring 2 cm in the greatest diameter suspicious for second intraabdominal tumor.  The mass separate from the largest mass measuring 2.6 cm in the dependent portion of pelvis is seen on the right of the  midline.  No retroperitoneal lymphadenopathy was seen.  No free air, free fluid or other abnormality was present.  The patient was scheduled for an outpatient colonoscopy, but had syncopal episode while sitting in the toilet the night before and was brought to the emergency room early in the morning by her daughter and son-in-law.  The patient had apparently stopped breathing for a few seconds and did not have a pulse, but started breathing before CPR could be initiated.  In the emergency room, the patient had an EKG revealing sinus rhythm nonspecific T-wave flattening; metabolic panel revealed a glucose of 148, creatinine of 1.3, CBC was normal.  She was treated with intravenous fluid.  The patient’s case was then discussed with Dr. Anabell Monique and patient was subsequently admitted to [facility].  The patient underwent a colonoscopy by Dr. David Rogers on 06/27/13 revealing sigmoid diverticulosis and grade II internal and external hemorrhoids, but no mass or colitis was seen.  CT-guided biopsy of the mass was performed on 06/27/13 as well revealing metastatic papillary adenocarcinoma with numerous psammoma bodies.  Pathology comment stated prior records were not available; however, with history of ovarian cancer the current biopsy would be consistent with metastases from that malignancy.  Oncology consult was obtained and I initially saw the patient on 06/27/2013 where the patient had claimed to have little bit of pain in the area of disease.  She reported being frequently constipated, denies any weight loss or fevers.  She did report having some night sweats, but thought that was because of her menopause.  On 06/27/13 metastatic workup including labs and CT scans were initiated.  CT scan of the chest on 06/27/13 revealed no acute disease in the chest.  A 1.4 cm size focal area of decreased density was noted in the lateral aspect of the right lobe of the liver consistent with a benign cyst or  hemangioma.  There was a very small hiatal hernia measuring 2.2 cm in diameter.  A CT scan of the head performed 06/27/13 revealed no acute intracranial abnormality noted.  CA-125 was 40 units/ml (0-35), CA 19-9 was 11.8 units/ml (0-35), CEA level was 0.8 ng/ml (0-3), TSH level was 1.09 IU/ml (0.34-5.6).  The patient was in workup for the syncopal episode, a carotid Doppler was performed on June 28 revealing an essentially normal study showing a reduction in diameter from 0-15% bilaterally.  A Holter monitor was completed on 06/27/13, which was unremarkable except for a few premature atrial contractions.  The patient was felt stable and subsequently was discharged home on 06/28/13.  Post discharge, the patient was seen at [facility] on 07/05/13 by Dr. Kathryn Thrasher who discussed treatment options with the patient including surgery.  The patient is in the office today 07/16/13 in follow up post hospitalization.  She is reporting that surgery is planned for July 30th of this month at .  · 7/30/13 to 8/3/13 - The patient was in [facility].  The patient was admitted for surgery for her recurrent ovarian cancer.  The patient had exploratory laparotomy, omentectomy, bowel resection, liver biopsy and appendectomy.  Pathology revealed of the omentum metastatic serous carcinoma of the transverse colon, segmental resection showed metastatic serous carcinoma involving mesenteric fat.  The liver wedge resection showed fibrosclerotic thickening of the hepatic capsule.  Benign liver parenchyma.  No evidence of metastatic tumor.  Appendix showed fibrous obliteration of the lumen.  Negative for metastatic carcinoma.  Rectum and sigmoid colon segmental resection showed metastatic serous carcinoma invading the colorectal mucosa uninvolved by tumor.  Vaginal mass showed metastatic serous carcinoma.  Margins are positive for tumor.  · 9/24/13 - Chemotherapy orders were written for  patient to start carboplatin 550 mg IV day one and Taxol 330 mg IV day one to be cycled every three weeks.  · 10/10/13 - The patient started cycle #1 of chemotherapy consisting of Carboplatin and Taxol.  · 09/25/13 -  is 10.6 normal.  · 10/01/13 - CT of the chest, abdomen and pelvis revealed new reticular nodular occupancy in the anterior segment of the right upper lobe, could reflect an inflammation or infectious etiology or could reflect unusual persistence of metastatic tumor.  New liver lesions, the smaller one appears to be implant on the surface of the liver, the larger may also represent an implant including the intrahepatic.  Several small mesenteric nodes seen throughout the abdomen and pelvis.  · 10/02/13 - Port placed by Dr. Sen.  · 10/24/13 - Orders written to start Neupogen daily and if more than three Neupogen are needed to give Neulasta after next chemo.  ANC is 0.15.  · 10/31/14 - Patient given cycle 2 of chemotherapy with Taxol and Carboplatin.  · 11/21/13 - The patient started cycle 3 of chemotherapy consisting of Carboplatin and Taxol.  · 11/26/13 - CT scan of chest, abdomen and pelvis revealed no evidence of active disease in the chest.  Reticulonodular opacity has resolved.  Metastatic lesion impressing upon the hepatic dome similar to prior exam.  No evidence of a change.  No evidence of new disease.  Postsurgical changes in the pelvis and throughout the retroperitoneum in the large bowel.  Finding containing anterior abdominal wall hernia containing fat tissue and enhancing vessels, 3 cm in diameter.  · 12/2/13 - Orders written to start Neupogen per protocol as well as Aranesp due to low hemoglobin and ANC.  ANC is 0.14.  Hemoglobin is 9.7.  · 12/11/13 - Orders written to hold chemotherapy until next week and decrease dose by 20% due to low platelet count.  Platelet count is 85,000.  · 12/16/13 - The patient received cycle 4 of Carboplatin and Taxol at a 20% dose reduction so Taxol dose  is 260 mg and Carboplatin is 440 mg.  · 12/16/13 - CA-125 12.6 (N).  · 12/19/13 - PET/CT scan.  Impression:  1. There is no evidence of hypermetabolism in the liver.  Specifically of the dominant lesion in or adjacent to the right hepatic dome that was seen and described on the recent CT study of 11/26/2013.  It is photopenic relative to the surrounding liver.  2.  There is no evidence of hypermetabolic soft issue mass lesion or free fluid in the abdomen or pelvis.  3.  The tonsillar tissues are slightly enlarged and have moderate activity level.  This maybe physiologic.  Correlation with oral cavity, examination is recommended.  4.  Mild amount of activity in the right level II jugular lymph chain without focally enlarged lymph nodes is of questionable significance.  · 1/6/13 - The patient received cycle 5 of chemotherapy with Taxol and Carboplatin.  · 1/27/14 - The patient started on cycle 6 of Taxol and Carboplatin.  · 2/18/14 - CT of the abdomen and pelvis with contrast; stable lesions impressing upon the hepatic dome, unchanged compared to the prior exam.  Multiple anterior abdominal wall hernias re-demonstrated.  Those above the umbilicus contain omental fat.  Below and to the left of the umbilicus as anterior abdominal hernia containing omental fat in nondilated small bowel which is larger than seen previously.  · 2/20/14 - The patient received cycle 7 of chemotherapy with Taxol and Carboplatin.   · 3/11/14 - Order written to discontinue chemotherapy due to patient has completed 7 cycles of chemotherapy and CT scans suggest possible remission.  · 3/11/14 -  11.0 (N).  · 06/05/14 - CT scan of the chest abdomen and pelvis with contrast: There are multiple anterior abdominal wall hernias.  There are 2 larger anterior abdominal wall hernias at the level of the transverse colon, which contain omental fat.  There are at least 2 small anterior abdominal wall hernias that contain omental fat.  There is a  moderate sized anterior abdominal wall hernia at the level of the umbilicus, eccentric to the left, which contain non-dilated bowel.  Interval decrease in the size of two deposit impressing on the liver.  The third tiny deposit has been stable.  · 8/19/14 -  10.4 (N).  · 12/19/14 -   10.0 (N)  · 1/7/15 - CT chest, abdomen and pelvis with stable appearance of the chest with several micronodules. Small hiatal hernia, slightly larger than previous exam, increased from 1.5 to 2.5 cm in diameter. Bochdalek hernia unchanged. Multiple hepatic lesions. The two dominant lesions at the right hepatic dome had decreased in size from previous. The remaining tiny nodules measured 3-5 mm in diameter and were unchanged. There was no evidence of new intra-abdominal or pelvic mass or free fluid. There were multiple anterior abdominal wall hernias which had increased in size.   · 7/27/15 - Comprehensive metabolic panel with no significant abnormalities. CA-125 of 21 (0-35).   · 10/26/15 - WBC 6, hemoglobin 13, platelet count 204,000. Heart rate 47. CA-125 of 20 (0-35).    · 2/18/16 - CT chest with contrast with stable micronodular density seen in the lungs without significant change from prior exam. CT abdomen and pelvis with regression of liver lesions with no new evidence of metastasis. Multiple ventral hernias again noted without significant change from prior exam. Creatinine 0.9 (0.6-1.3).    · 2/25/16 - Patient hospitalized at [facility] between 2/25/16 and 2/27/16 with pyelonephritis secondary to E-coli. She was given two days of IV Rocephin and then placed on oral Levaquin. She was asked to hold her Coumadin, but then had nausea and vomiting because of Levaquin and stopped the Levaquin also. Her CA-125 was 32 (0-35) and CEA 1.3 (0-5). Her urine grew >100,000 E-coli. CT of the abdomen and pelvis was done which revealed 4.1 x 1.8 cm sized mild ovoid area of decreased density in the liver parenchyma along  the anteromedial aspect of the dome of the right lobe of the liver, unchanged significantly since the previous study of 2/18/16. There was suspicion of a very small pericardial effusion. There was suspicion of a small hiatal hernia. There were several hernias in the anterior abdominal wall. A 3.5 x 3.1 cm incised well-circumscribed abnormal density in the left retroperitoneal fatty soft tissue at the level of the left iliac crest was suggestive of tumor recurrence. There was an abnormal density in the left retroperitoneal soft tissues in the left flank that partially surrounded the left kidney and extended downward to the left pelvic area. Diverticulosis of the distal descending colon and sigmoid colon were seen.     · 3/8/16 - Patient prescribed Bactrim DS 1 p.o. b.i.d. for 10 days for pyelonephritis, which was partially treated with Rocephin and Levaquin. Urine with 40,000 E-coli treated with Macrobid for 7 days.  of 20 (0-35).    · 3/31/16 - PET scan with area of low density anteriorly in the right lobe of the liver with no significant radiopharmaceutical uptake to suggest malignancy. Multiple round soft tissue density masses showing increased radiopharmaceutical activity and multiple anterior abdominal wall hernias.   · 5/10/16 - Patient complains of venous enlargement of the left chest wall around the port. Has not been seen by  yet, but has an appointment on 5/23/16. Complained of a cough.   · 5/12/16 - Infusaport contrast study with no evidence of fibrin sheath. Chest x-ray with mild cardiac prominence.   · 5/23/16 - Patient seen in consultation by Sheng Bowman M.D. at [facility] and a chemotherapy trial recommended.   · 6/1/16 -   of 27.1 (0-35).    · 6/14/16 - WBC 5.71, hemoglobin 12.4, platelet count 188,000. Patient is scheduled for surgery at  on 6/16/16 after further discussion with  physicians. Discussed adjuvant chemotherapy.   · 6/16/16 - Excisional biopsy of abdominal  wall mass performed by Sheng Bowman M.D. at [facility] with pathology revealing metastatic high-grade papillary serous carcinoma.   · 7/7/16 - Received a call from the patient that Tramadol was not working and that she was given Norco #24 after her biopsy at  which did help her pain. Patient prescribed Norco 5/325 one p.o. q. 4-6 hours p.r.n. #60 with no refills. Echocardiogram with left ventricular inferior wall hypokinesis with ejection fraction of 50-55%.   · 7/8/16 - Patient seen in consultation by Sheng Bowman M.D. in consultation at  for metastatic high-grade papillary serous carcinoma diagnosed by excisional biopsy on 6/16/16 with carcinomatosis and patient not a surgical resection candidate. Genetic sequencing and susceptibility testing of tumor was ordered. Biopsy was done of anterior abdominal wall.   · 7/7/16 - Echocardiogram with left atrial enlargement. Mild mitral regurgitation. Left ventricular proximal inferior wall hypokinesis with ejection fraction of 50-55%.   · 7/11/16 - While waiting for genomics results, in order not to delay treatment further, order written for Taxotere 60 mg/M2 IV over one hour followed by Carboplatin AUC 5 over one hour, cycles to be repeated every three weeks.  Comprehensive metabolic panel normal.  26 (0-35).    · 725/16 - Pain contract signed for use of Norco.   · 8/5/16 - Patient stated that she experienced a red rash and was itchy post taking Norco. Norco discontinued and Tramadol refilled.   · 8/16/16 - Patient started cycle 1 chemotherapy. WBC 7.1, hemoglobin 11.2, MCV 88.7, platelet count 94,000. Patient complained of nausea for a week post chemo. Already getting Emend, Aloxi and Decadron. Added Omeprazole 40 mg daily orally.   · 8/17/16 - Urine culture with >100,000 E-coli.   · 8/25/16 - Cycle 2 chemotherapy given.   · 9/9/16 - Magnesium 1.3, replaced intravenously. Potassium 3.4 (3.6-5.1), increased oral potassium supplementation.     · 9/16/16 - Cycle 3 chemotherapy given.   · 9/19/16 - Comprehensive metabolic panel with no significant abnormality.   · 9/20/16 - CT abdomen and pelvis with evidence of progressive metastatic disease in the abdomen and pelvis with interval enlargement of several soft tissue attenuations and subcutaneous nodules in the anterior abdominal wall. Interval enlargement of a left pelvic soft tissue attenuation mass. A lentiform area of low attenuation in the dome of the liver stable in size. Multiple anterior abdominal wall hernias containing fat and/or bowel. Marked pelvic floor laxity. CT chest negative for evidence of metastatic disease. Tiny pulmonary nodules in the right lung stable since 2013 consistent with a benign etiology.   · 9/23/16 - Macrobid 100 mg p.o. b.i.d. x7 days prescribed for UTI. Current chemotherapy discontinued after discussion and new chemotherapy orders written. Carboplatin AUC-5 IV day 1 and Doxil (Liposomal Doxorubicin) 30 mg/M2 IV day 1 to cycle q. 21 days.  of 18 (0-35). Magnesium 1.6, replaced intravenously. Comprehensive metabolic panel with potassium 3.4 (3.6-5.1).     · 10/13/16 - Patient started on cycle 1 of Carboplatin and Liposomal Doxorubicin followed by Neulasta.   · 11/3/16 - Cycle 2 Carbo and Doxil given.   · 11/17/16 - Magnesium 1.0, replaced intravenously. Oral potassium supplement increased.   · 11/29/16 - CT chest with small non-calcified right pulmonary nodules unchanged. Benign bone island in the midthoracic vertebral body along with benign bony hemangiomas in the middle thoracic vertebral bodies. CT abdomen and pelvis with mild progressive metastatic disease from CT of September 2016. Anterior abdominal wall mass superiorly mildly increased in size with new area noted as described measuring 3 x 1.7 cm. 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 Stable area of decreased uptake in the dome of the liver not hypermetabolic  on recent PET scan, likely representing cyst or focal fatty infiltration. Stable small hiatal hernia, fat-containing Bochdalek’s hernia and anterior abdominal wall hernias with stable pelvic floor relaxation.    · 12/1/16 - Cycle 3 chemotherapy given.   · 12/8/16 - Current chemotherapy discontinued and patient started on Gemcitabine 1000 mg/M2 IV days 1, 8, 15 q. 28 days.   · 12/22/16 - Patient seen in followup by Juliana Roy M.D. at the pain clinic, treated with Oxycodone and Lyrica. Transaminases normal. Patient started cycle 1 chemotherapy.   · 12/29/16 - Magnesium 1.1 (1.8-2.5), replaced intravenously.   · 1/5/17 - Cycle 1 day 15 chemotherapy held as patient leaving for vacation to [place]. Chemotherapy dose decreased by 20%. Patient complains of diarrhea for which Imodium prescribed.   · 1/11/17 - BRCA-1 and BRCA-2 negative.   · 1/12/17 - Echocardiogram with ejection fraction 60% or greater.   · 1/19/17 - Comprehensive metabolic panel with no significant abnormality. Patient started cycle 2 chemotherapy.   · 2/2/17 - Urine with >100,000 E-coli treated with Cipro 500 mg p.o. b.i.d. x1 week.   · 2/6/17 - Magnesium 1.1 (1.8-2.5), replaced intravenously.    · 2/23/17 - Patient started cycle 3 chemotherapy.   · 2/27/17 - CT chest with interval development of too numerous to count very small pulmonary nodules, ill-defined ground glass opacities concerning for development of pulmonary metastatic disease. Stable left-sided chest port. CT abdomen and pelvis with stable appearance of multiple small soft tissue densities within the abdomen near midline subcutaneous fat of the abdominal wall. Interval decrease in size of largely calcified left pelvic mass and multiple fat in bowel containing small ventral hernias.   · 3/6/17 - Pulmonary consultation obtained for lung nodules. Discussed CT results with patient. Decision made to continue present chemotherapy until further lung evaluation as abdominal disease  better.  of 18 (0-35).    · 3/16/17 - Patient started cycle 4 chemotherapy, but treatment held from day 8 onwards because of hospitalization.   · 3/19/17 - Patient was hospitalized at [facility] between 3/19/17 and 3/25/17 with chest pain. Chest x-ray had revealed increased mild bibasilar airspace disease. Troponin I and EKG’s were negative. INR was elevated. Patient was treated with antibiotics. EGD was performed revealing small hiatal hernia and esophageal dilatation was performed. Bronchoscopy was performed also with no intrabronchial lesions identified. IgA was 51 (), IgG 320 (600-1500) and IgM 19 (). Lexiscan nuclear stress test had no evidence of reversible myocardial ischemia with normal left ventricular ejection fraction of 58%. CT chest had development of patchy bilateral ground glass opacities most pronounced involving the left upper lobe and bilateral lower lobes. Multiple tiny bilateral pulmonary nodules were less conspicuous. The patient underwent a bronchoscopy by Jerica Esparza M.D. with right upper lobe bronchoalveolar lavage revealing benign squamous cells and bronchial cells with pulmonary macrophages present. There was mild acute inflammation. Negative for malignant cells. Prilosec was changed to Famotidine for hypomagnesemia.    · 4/6/17 - Magnesium 1.2 (1.8-2.5). Comprehensive metabolic panel with no significant abnormality. Patient seen in follow-up by Fito Ram M.D. at [facility]. Treated with Lyrica and Oxycodone.    · 5/4/17 - Patient complains of a rash itching on her right upper arm. Eczematous rash noted and patient prescribed Cyclocort 0.1% b.i.d. Magnesium infusions continued with each chemo. Order written to resume chemotherapy.   · 5/16/17 - Cycle 5 chemotherapy started.   · 5/26/17 - Magnesium 1.4 (1.8-2.5), replaced intravenously. Potassium 2.9 (3.6-5.1), KCL increased to 20 mEq three in the morning and three in the evenings.   · 5/30/17 - PET scan with  remaining metabolic activity within the nodular areas. The suggested mass which is partially calcified and necrotic within the left aspect of the pelvis seems slightly larger with increased metabolic activity. There was more calcification in these areas compared to prior study, suggesting inflammation.      · 6/8/17 - Patient complaining of shortness of breath. Does take HCTZ at home. Chest x-ray ordered and chemotherapy continued along with weekly magnesium replacement. Chest x-ray with no acute cardiopulmonary abnormalities.   · 6/13/17 - Patient received cycle 6 of chemotherapy.   · 7/31/17 - WBC 5.0, hemoglobin 11.2, MCV 89.7, platelet count 217,000. Patient is to resume chemotherapy starting with cycle 7 on Wednesday of this week.  of 19 (<35). Potassium 3.3 (3.5-5.3).     · 8/2/17 - Patient began cycle 7 of chemotherapy.   · 8/30/17 - Patient started cycle 8 chemotherapy.   · 9/5/17 - Patient seen in followup at Pain Management by Fito Ram M.D. and continued on treatment with Oxycodone and Lyrica.   · 9/13/17 - Patient was hospitalized at [facility] for a day with dizziness secondary to dehydration, hypokalemia and anemia. She was given 1 unit of packed red blood cells and electrolytes were replaced. Chest x-ray revealed a subtle opacity in the left lateral lung base favored to represent atelectasis. Magnesium 1.3 (1.5-2.5), patient continued on replacement.    · 9/22/17 - Chemotherapy and magnesium replacement continued with decrease in chemotherapy dose by 10% secondary to cytopenias.   · 9/25/17 - Cycle 9 chemotherapy started.   · 10/12/17 - CT of the chest with contrast showed several tiny pulmonary nodules. One within the right lower lobe appears new from prior exams. Two adjacent foci of nodularity within the medial right lower lobe suggestive of minor infectious or inflammatory process. CT of the abdomen and pelvis with contrast which showed soft tissue nodules along the anterior abdominal  and pelvic walls unchanged from previous scan. Also a partially calcified mass within the left pelvis unchanged. No acute process identified within the abdomen or pelvis. Several left paracentral ventral hernias unchanged. No evidence of acute bowel obstruction.   · 10/16/17 - Order written for Levaquin 500 mg p.o. daily as the patient states that she has had a productive cough, fever and chills and with the results of the CT scan showing a possible infectious versus inflammatory process. Order also written to continue chemotherapy and magnesium replacement as previously prescribed.   · 10/23/17 - Cycle 10 chemotherapy started.   · 11/19/17 - Patient went to the Emergency Room at [facility] complaining of right lower extremity redness and fever for a day. Venous Doppler had no evidence of a DVT and patient was discharged home on Clindamycin.   · 11/21/17 -  of 17 (0-35).   · 12/4/17 - Cycle 11 chemotherapy started.   · 12/20/17 - PET scan with multiple hypermetabolic soft tissue nodules abutting the anterior abdominal wall, stable from previous examination. Peripherally calcified left lower quadrant mass near the ascending colon stable in size demonstrating no hypermetabolic uptake. Previously had maximum SUV of 7. Right medial basilar pulmonary nodules resolved.   · 12/22/17 - CT left lower extremity with soft tissue swelling with skin thickening present along the anterior and medial aspect of the leg, slightly more pronounced around the palpable marker seen within the upper medial aspect of the lower extremity.   · 12/26/17 - Elastic stockings prescribed for lower extremity edema.   · 1/8/18 - Patient hospitalized at [facility] between 1/8/18 and 1/11/18 with fever of up to 101 degrees and painful rash on the lower extremities of several weeks’ duration. She was found to be hypokalemic and MRI of the lower extremities revealed thickening and swelling. Chest x-ray had no acute cardiopulmonary abnormality. Skin biopsy was  performed by Surgery revealing stasis dermatitis and no evidence of malignancy. Infectious Disease consultation was obtained and IV antibiotics were stopped. Blood cultures had no growth.   · 1/22/18 - Patient asked to start wearing elastic stockings which she has not started yet. She was given a prescription for Dyazide 1 p.o. daily.   · 2/26/18 - Ordered chemo to resume again. Patient unaware that she was supposed to resume chemo after her last visit in January. Continue magnesium infusions on day 1, 8 and 15. Prescribed Levaquin 500 mg p.o. daily x10 days for productive cough x1 week. History of viral illness consistent with influenza.  of 18 (0-35). Chest x-ray with no acute process.    · 3/5/18 - Cycle 12 chemotherapy started.   · 3/26/18 - Options discussed with patient. Decision made to discontinue IV Gemzar and orders written to start on Niraparib (Zejula) 300 mg p.o. daily as maintenance therapy.   · 4/11/18 - Niraparib discontinued due to insurance denial. Orders written to start Carboplatin-AUC 5 IV day 1 cycling every 21 days. Orders written for Neulasta 6 mg subcutaneous kit day 1 with palliative treatment intent and expected duration of treatment three months.   · 4/12/18 - CT chest, abdomen and pelvis with contrast revealed numerous tiny centrilobular nodules in the lungs favored to reflect infectious or inflammatory process. Nodules ranged 1-3 mm in size and are new from prior studies with metastatic disease not entirely excluded. Stable small hiatal hernia. Interval progression of metastatic disease involving the subcutaneous tissues at the ventral abdominal wall. Multiple soft tissue density nodules previously shown to be hypermetabolic on PET scan. New small crescent of low attenuation material along the periphery of the spleen with similar finding at the dome of the liver. Findings are concerning for peritoneal metastases. Density of the dome of the liver remained unchanged from multiple  prior studies. Stable calcified mass in the left pelvic sidewall. Urinary bladder wall thickening.   · 4/23/18 - Cycle 1 chemotherapy with Carboplatin administered along with Neulasta injection.   · 4/26/18 - Neulasta discontinued due to insurance denial.   · 5/14/18 - Patient received cycle 2 Carboplatin.   · 6/5/18 - Cycle 3 day 1 chemotherapy with Carboplatin administered.   · 6/20/18 - CT chest, abdomen and pelvis at [facility] showed re-demonstration of soft tissue mass in the ventral wall hernia left of the midline as well as the dome of the liver, likely representing metastatic disease similar as compared to the prior study. Additional calcified lesions present in the upper left hemipelvis and along the anterior abdominal wall to the right of the midline also likely representing metastatic disease. No definite new lesions were identified. Moderate to large stool burden likely related to mild constipation was present. No suspicious lung nodules were identified. The previously-described ground glass nodules appeared to have resolved. There was a stable subpleural nodule in the right upper lobe measuring 3 mm present since 2014 without significant changes.   · 6/26/18 - Cycle 4 day 1 Carboplatin administered with addition of Neulasta for febrile neutropenia prophylaxis.   · 6/29/18 - Reviewed CT scans with patient. Orders written to discontinue Carboplatin and Neulasta and plan to start Niraparib 300 mg by mouth daily as maintenance therapy. Prescribed Amlodipine 2.5 mg p.o. daily for chemo-induced hypertension.   · 7/18/18 - Orders written to increase weekly Magnesium Sulfate to 3 g IV weekly due to continued hypomagnesemia.   · 8/1/18 - Patient reports she has not received Niraparib (Zejula) to date.   · 8/30/18 - Patient’s phone was not working so though Niraparib approved, the drug company had not been able to get ahold of her. Patient supplied with drug company number so that she can start taking  the pills.   · 9/6/18 -  of 20 (N).   · 9/18/18 - Urinalysis done for dysuria and back pain. Urine culture grew 20,000 to 50,000 colonies of urogenital ruy. Prescribed Bactrim DS one tablet twice daily for one week.   · September 2018 - Patient initiated on Zejula 300 mg p.o. daily.   · 10/1/18 - WBC 3.5, hemoglobin 12, platelet count 74,000. Niraparib (Zejula) dose held and then resumed when platelets above 100,000 at a dose of 200 mg daily.   · 10/15/18 - Patient received Niraparib (Zejula) at 200 mg p.o. daily with a platelet count of 198,000.   · 11/7/18 - WBC 6, hemoglobin 11.6, MCV 96.2, platelet count 137,000. Patient claims to have stopped taking Niraparib a few days prior because of cough. Asked to resume taking it. Ciprofloxacin 500 mg p.o. twice daily for one week for bronchitis.   · 12/3/18 - Magnesium Sulfate infusions changed to every two weeks, to send mag level before and give 3 grams. DC’d Magnesium Oxide and started Magnesium Plus Protein two pills twice daily.   · 1/4/19 - CT chest, abdomen and pelvis with new patchy nodular areas of airspace consolidation within the bilateral upper lobes, left greater than right, favored to be infectious or inflammatory. Interval enlargement of the peritoneal soft tissue masses within the pelvis compatible with progression of disease. Enlarging ventral hernia now containing multiple loops of small bowel and colon.   · 1/7/19 - Patient has not been coming in for weekly magnesium replacement as nursing has not been able to get ahold of her. Results of CT’s discussed with patient. Decision made to change her to IV chemotherapy with Carboplatin AUC-5 day 1 and pegylated liposomal Doxorubicin (Doxil) 30 mg/M2 IV day 1 to cycle every four weeks. Patient made aware of the limited choices of her treatment available.   · 1/31/19 - Echocardiogram showed LVEF 50-55% and otherwise normal echo Doppler study.   · 2/4/19 - New chemotherapy not initiated to date with  difficulty contacting patient for echocardiogram. Neulasta On-Pro 6 mg ordered with chemotherapy due to extensive prior exposure to chemotherapy, increasing risk of febrile neutropenia, history of neutropenic events on prior chemotherapy regimens.   · 2/15/19 - Patient started cycle 1 chemotherapy with Neulasta support.   · 3/6/19 -  of 28 (0-35).   · 3/15/19 - Cycle 2 Carboplatin with Liposomal Doxorubicin (Doxil) and Neulasta support initiated.     · 4/10/19 - Patient was requested to followup with Dr. Queen regarding hypotension and blood pressure medication dosing. Patient appears to be tolerating treatment well with cytopenias not requiring dose adjustments. No Doxil-related skin or mucus membrane toxicities or other significant toxicities to date.   · 4/12/19 - Cycle 3 chemotherapy given.   · 4/16/19 - CT chest, abdomen and pelvis with no evidence of active metastasis to the chest. Several tree-in-bud nodules within the periphery of the inferior right upper lobe likely infectious or inflammatory. Disease burden within abdomen and pelvis stable to slightly increased from prior CT. Specifically, a soft tissue mass along the right rectus muscle slightly increased in bulk. Multiple ventral hernias, some containing loops of bowel, with no evidence of acute bowel obstruction.   · 5/8/19 - Discussed CT results with patient. Overall stable disease and would like to continue same treatment. Dove soap and Hydrocortisone Cream p.r.n. prescribed for scattered rash on back.   · 5/10/19 - Cycle 4 Carboplatin and Liposomal Doxorubicin initiated.   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 It was ER positive, HER2/zuleima negative, PD-L1 negative. There was TOPO1 positivity and TUBB3 positivity. TMB was low (two mutations per megabyte MUTS/MB) and MSI was stable. There were 13 therapies considered with increased benefit. The 13 therapies with increased  benefit included Fulvestrant, Irinotecan, Letrozole, Topotecan, Anastrazole, Exemestane, Tamoxifen, all of which were referenced to [facility] as well as Abemaciclib, Everolimus, Gefitinib, Palbociclib, Ribociclib and Toremifene.     · 6/5/19 echocardiogram with preserved LV systolic function with EF around 60%.  · 6/7/19 cycle 5 chemotherapy with Neulasta support given.  Patient continued on mag oxide 400 mg p.o. twice daily and weekly IV 3 g mag sulfate infusions for persistent hypomagnesemia.  · 7/5/2019- cycle 6 chemotherapy with carboplatin and doxorubicin with Neulasta port initiated.  Ca125:  21 (0-35).  · 7/23/2019-CT chest abdomen and pelvis compared to CT from 4/16/2019 revealed multiple small pulmonary nodules unchanged from prior examination within the right upper and left lower lobes.  Complex ventral hernia similar to prior exam.  Soft tissue nodule along the right lateral margin of the right of the midline measuring 12 x 10 mm unchanged in size.  Irregular soft tissue nodular thickening along the margin of the most inferior aspect of the complex ventral hernia with thickness measuring up to 13 mm similar to prior examination.  Extensive calcified and soft tissue mass within the left lower quadrant decrease in size now measuring 2.6 x 2.3 cm previously 3.0 x 2.5 cm.  Partially calcified mass involving the right rectus sheath measuring 3.1 x 2.7 cm previously measured 3.3 x 2.9 cm.  Densely calcified mass measuring 2.1 x 1.6 cm unchanged previously measured 2 x 1.7 cm.  Right external iliac chain lymph node measuring 9 mm previously measured 5 mm.  · 8/2/2019 cycle 7 chemotherapy given.  · 8/30/2019-cycle 8 chemotherapy with carboplatin and doxorubicin with Neulasta support given.  · 9/27/2019 cycle 9 chemotherapy given.  · 10/1/19 - Echocardiogram showed Normal LV size and contractility EF of 60-65%. Normal RV size. Borderline left atrial enlargement. Aortic valve, mitral valve, tricuspid valve appears  structurally normal, no significant regurgitation seen.No pericardial effusion seen. Proximal aorta appears normal in size.  · 10/18/19 - Magnesium 1.5 (1.8-2.5).  IV magnesium replacement continued.  · 10/25/2019 cycle 10 chemotherapy given.  · 10/29/2019 patient had CT scan of the chest abdomen and pelvis-there is a new 2 mm nodule in the left lower lobe with a stable 2 mm nodule in the left lower lobe.  Follow-up in 6 months was recommended.  Stable multiple soft tissue density and calcified nodules within the ventral abdominal wall and left retroperitoneal fat consistent with nonviable metastatic disease.  These nodules have either decreased in size or stable.  · 11/22/2019 10 received cycle 11 of chemotherapy with Doxil and carboplatinum with Neulasta  · 12/8/2019 to 12/11/2019 patient was admitted to the hospital for neutropenic fever.  · 12/20/2019 patient received cycle 12 of chemotherapy with Doxil and carboplatinum with Neulasta  · 1/13/20: 2 D echo was normal with EF 56% to 60%  · 1/24/20-Patient received cycle 13 of combination chemotherapy with carboplatinum and Doxil  · 2/3/2020 patient had a  which was 25.4  · 2/21/2020-patient received cycle 14 of chemotherapy with carboplatinum and Doxil  · 3/6/2020 patient had CT scan of the chest, abdomen and pelvis this revealed a 3 mm nodule in the left lower lobe present on prior CT of the abdomen unchanged from July 2019.  Stable 3 mm right upper lobe nodule.  There is a lymph node in the AP window measuring 8 x 9 mm prominent left hilar lymph node measuring 12 mm previously was 7 x 7 mm no significant adenopathy was seen in the abdomen there is an area of irregular soft tissue in the left paramidline ventral hernia which is stable measuring 5.7 cm partially calcified mass in the right rectus measuring 3 x 2.5 cm which is also stable the prominent lymph nodes in the left mid hilum and mediastinum may be reactive or metastatic disease  · 4/17/2020-patient  had cycle 15 of single agent carboplatinum  · 5/26/2020 patient had CT scan of the chest, abdomen and pelvis showed 3 new lung nodules measuring 7 mm in the left upper lobe, 5 mm in the left lower lobe and 4 mm in the right lower lobe.  There were additional small lung nodules which were unchanged.  Mildly prominent mediastinal and bilateral hilar lymph nodes mildly increased in size.  Right hilar node 9 mm previously was 5 mm.  Carinal node 9 mm previously was 6 mm.  The nodule at the right side of the hernia sac has increased to 1.5 cm from 1.2 cm.  Also a nodule in the subcutaneous fat currently measures 2.4 was previously 2 cm.  · 5/15/2020-patient received cycle 16 of carboplatinum  · Since the last visit in the office patient patient developed dizzy spell and fell. For that reason she was taken to the emergency room at [place].    · 7/3/2020 she had brain MRI which showed an 8 mm focus of abnormality in the left aspect of the posterior fossa corresponding to a dural based enhancing lesion thought to represent a calcified meningioma vessels calcified dural based metastasis.  This lesion has a slight local mass-effect and a small amount of surrounding edema.  There is also a 4 to 5 mm rounded focus of abnormal nodular enhancement along the cortex of the left parietal lobe but no significant mass-effect.  This is nonspecific could represent neoplastic process, infectious/inflammatory process or granulomatous disease.  · Patient was seen by neurosurgery, follow-up brain MRI in 8 weeks has been recommended by Dr. Hartman  · 7/9/2020 patient was initiated on single agent topotecan cycle 1 day 1  · 7/16/2020 she received the 8 topotecan  · 8/6/2020 patient received cycle 1 day 15 of topotecan  · 9/2/2020 patient had brain MRI multiple hospital which showed interval increase in size of the metastasis in the left parietal lobe now measuring 9 x 7 mm.  Previously was 5 mm.  There has been interval increase in size of the  left superior cerebellar metastases now measuring 1.3 cm previously was 8 mm.  There was no new metastatic disease identified.  · 9/11/2020 patient received cycle 2-day 15 of single agent topotecan  · Was admitted to the hospital 10/1/2020 for supratherapeutic INR       2. Deep vein thrombosis of left upper extremity diagnosis established in October of 2013.  · 10/16/13 - Venous Doppler of left upper extremity.  Impression:  Deep vein thrombosis involving the subclavian, axillary and brachial veins on the left side, superficial vein thrombosis involving the left basilic vein.  · 10/16/13 - Order written for Lovenox 120 mg subcutaneously daily until INR is in therapeutic range.  Order written for Coumadin 5 mg p.o. daily.  The patient is to follow PT and INR protocol.  · 5/20/16 - Venous Doppler bilateral lower extremities normal.  · 7/30/19 - Patient continued on Coumadin following the INR protocol.      3. Anemia diagnosis established in November 2013 and pernicious anemia diagnosis established March 2016.   · 11/15/13 - Hemoglobin is 7.8.  · 12/2/13 - Orders written to start Aranesp 200 mg subcutaneous every two weeks due to low hemoglobin secondary to chemo induced anemia.  Hemoglobin is 9.7.  Anemia workup is ordered.  · 12/03/13 - Ferritin 179.8 (N), folic acid 8.3 (N), vitamin B12 314, iron 104 (N), TIBC 298 (N), iron saturation 35 (N), Reticulocyte count 0.88 (N).  EPO level 124 (N).  Haptoglobin 22 (H).  · 10/22/14 - Hemoglobin 12.5, hematocrit 37.3, MCV 91.6.  · 10/23/14 - Anemia resolved.   · 3/8/16 - WBC 5.8, hemoglobin 11.7, MCV 90.3, platelet count 245,000. Vitamin B12 of 189 (180-914), ferritin 61 (), iron 91 (), TIBC 345 (228-428).   · 3/15/16 -  ().        · 3/22/16 - Intrinsic factor antibody positive, antiparietal antibody negative. Vitamin B12 at 1000 mcg IM weekly started, but the patient after receiving first dose on 3/22/16 did not come back for injections until  4/13/16.   · 4/13/16 - WBC 5.1, hemoglobin 12.5, MCV 87.9, platelet count 201,000. Patient given B12 injection and then continued at home.   · 5/10/16 - WBC 5.1, hemoglobin 12.5, platelet count 201,000.   · 6/14/16 - Monthly Vitamin B12 injections continued at home.   · 7/11/16 - WBC 5.48, hemoglobin 12.7, MCV 86.2, platelet count 200,000.   · 8/16/16 - Patient continued on monthly Vitamin B12 injections at home.   · 1/5/17 - WBC 2.6 with 38% neutrophils, 56% lymphocytes, 5% monocytes, hemoglobin 10.5, .3, platelet count 63,000. Patient continued on Vitamin B12 injections 1000 mcg IM monthly at home.   · 3/20/17 - Retic 0.41 (0.5-1.5), creatinine 1.0 (0.4-1.0). Iron 12 (), TIBC 270 (228-428), ferritin 259 (), haptoglobin 340 (), TSH 0.43 (0.34-5.6), folate 6.0 (5.9-24.8). Serum protein electrophoresis revealed decreased gammaglobulins. Serum EDU had no monoclonal gammopathy identified. Stool Hemoccult was negative.   · 6/8/17 - WBC 6.3, hemoglobin 8.3, MCV 92.4, platelet count 50,000. Procrit 40,000 units subq weekly added for chemotherapy-induced anemia. Patient continued on Vitamin B12 at 1000 mcg IM monthly at home.   · 7/5/17 - Iron 33 (), TIBC 328 (228-428), ferritin 211 (), TSH 2.46 (0.34-5.6).       · 7/31/17 - WBC 5.0, hemoglobin 11.2, MCV 89.7, platelet count 217,000. We will continue with the Procrit 40,000 units subq weekly for chemo-induced anemia when the hemoglobin falls below 10.0 and the patient is also to continue with the Vitamin B12 at 1000 mcg IM monthly at home. Creatinine 1.24 (0.5-0.99).    · 8/28/17 - WBC 9.6, hemoglobin 8.7, MCV 93.7, platelet count 133,000. Patient is to continue with the Procrit 40,000 units subq weekly and will receive that today. She is also to continue with the Vitamin B12 at 1000 mcg IM monthly at home.  · 10/16/17 - WBC 7.5, hemoglobin 9.6, MCV 98.4, platelet count 195,000. Patient is to continue with Procrit 40,000 units subq  weekly and will receive Procrit today.   · 1/1/18 - Creatinine 1.3 (0.4-1.0).    · 2/19/18 - Procrit given for hemoglobin 9.9.   · 2/26/18 - Continue Procrit 40,000 units weekly p.r.n. hemoglobin <10. Continue Vitamin B12 at 1000 mcg IM monthly at home.   · 3/26/18 - Hemoglobin 8.3. Procrit dose increased to 60,000 units weekly. Iron 29 (), TIBC 306 (228-428), and iron sat 9% (15-50). Iron 29 (), TIBC 306 (228-428), iron saturation 9% (15-50).   · 3/27/18 - Order written to start Ferrous sulfate 325 mg p.o. b.i.d.    · 4/2/18 - Stool heme negative x3.   · 4/30/18 - Patient had not started Ferrous sulfate 325 mg p.o. b.i.d. and verbalized understanding to do so and to notify the office if side effects are not tolerable.   · 5/24/18 - Patient was hospitalized at [facility] between 5/24/18 and 5/26/18 with dark tarry stool. INR was subtherapeutic. She was given IV Protonix and Protonix 40 mg daily. She underwent an EGD by David Rogers M.D. revealing small hiatal hernia, small submucosal nodule near the cardia, erosive gastritis. Pathology on gastric antrum and body biopsy revealed iron pill gastritis and no evidence of Helicobacter pylori. She then underwent a colonoscopy revealing diverticulosis, hemorrhoids and surgical changes from previous resection. It was recommended to consider outpatient M2A if hemoglobin continued to drop.   · 6/4/18 - Order written to discontinue iron pills and to use Injectafer 750 mg IV days 1 and 8 for low iron sats. Order written for Procrit 40,000 units subq weekly. Iron 65 (), TIBC 328 (228-428), iron saturation 20% (15-50), ferritin 123 ().   · 6/29/18 - Discussed patient’s intolerance of oral iron due to gastritis. Oral iron discontinued with plans for Injectafer should iron level drop in the future.   · 11/7/18 - Patient claims not to be taking Vitamin B12 injections at home. Asked to resume those.   · 12/3/18 - Patient reports she has not been taking her  B12 injections for a couple of months. She needs some syringes and needles for that.   · 2/4/19 - Patient is uncertain if she is currently taking Ferrous Sulfate or not. Patient with history of intolerance and will follow hemoglobin.   · 5/8/19 - Ferritin 64 ().  · 8/27/2019- iron 52 (), iron saturation 14% (15-50), TIBC 364 (228-420), and ferritin 74 ().  Injectafer 750 mg IV day 1 and 8 due to oral iron intolerance ordered.  · 8/30/2019- Injectafer 750 mg IV day 1 given.  Hemoglobin 10.8.  At the end of the infusion heart rate was 48 and the patient reported mild dizziness.  Patient was sent to the ED for evaluation with symptoms resolving rapidly.  Chest x-ray and CT head were negative for acute findings.  · 9/6/2019-Injectafer 750 mg IV day 8 given without adverse effects.  Hemoglobin 9.8.   · 9/25/19 - Iron 85 (). Iron saturation 25 (15-50). TIBC 335 (228-428). Ferritin  694 ().  Hemoglobin 10.3.  · 10/25/2019 hemoglobin 9.7.  Patient started on Retacrit 40,000 units subcu weekly.    Past Medical History:   Diagnosis Date   • Anemia 2013   • Cervical disc disorder 2013    Herniated disc, pinched nerve   • Clotting disorder (CMS/HCC) 2013    Low platelets from chemo   • Colon polyp 2013   • Deep vein thrombosis (CMS/HCC) 2013   • Diverticulosis 2013   • GERD (gastroesophageal reflux disease) 2016   • HL (hearing loss) 2016    I need hearing aids   • Hyperlipidemia 2013    Need my cholesterol rechecked   • Hypertension    • Joint pain 2013    Shoulder pain, torn rotator cuff   • Low back pain 2013   • Neuromuscular disorder (CMS/HCC) 2015    Shingles, pinched nerves in my neck   • Osteopenia 2014   • Osteoporosis    • Ovarian cancer (CMS/HCC)     ovarian   • Pneumonia 2010    Have had it several times   • Spinal stenosis 2013    Cervical   • Urinary tract infection 2013    Have had several utis       Past Surgical History:   Procedure Laterality Date   • COLON SURGERY     •  COLONOSCOPY  05/26/2018   • EXPLORATORY LAPAROTOMY     • HERNIA REPAIR     • HYSTERECTOMY     • OOPHORECTOMY           Current Outpatient Medications:   •  acetaminophen (TYLENOL) 500 MG tablet, Take 1,000 mg by mouth Every 6 (Six) Hours As Needed for Mild Pain ., Disp: , Rfl:   •  atorvastatin (LIPITOR) 20 MG tablet, Take 20 mg by mouth every night at bedtime., Disp: , Rfl: 3  •  cyanocobalamin 1000 MCG/ML injection, Inject 1,000 mcg into the appropriate muscle as directed by prescriber Every 28 (Twenty-Eight) Days., Disp: , Rfl:   •  famotidine (PEPCID) 40 MG tablet, TAKE 1 TABLET BY MOUTH EVERY MORNING BEFORE BREAKFAST, Disp: 90 tablet, Rfl: 1  •  loperamide (Imodium A-D) 2 MG tablet, Take 1 tablet by mouth 3 (Three) Times a Day As Needed for Diarrhea., Disp: 60 tablet, Rfl: 2  •  magnesium oxide (MAG-OX) 400 MG tablet, TAKE 1 TABLET BY MOUTH TWICE A DAY, Disp: 180 tablet, Rfl: 1  •  ondansetron ODT (ZOFRAN-ODT) 8 MG disintegrating tablet, Place 1 tablet under the tongue Every 8 (Eight) Hours As Needed for Nausea or Vomiting., Disp: 30 tablet, Rfl: 3  •  oxyCODONE (ROXICODONE) 10 MG tablet, Take 1 tablet by mouth 3 (Three) Times a Day As Needed for Moderate Pain ., Disp: 90 tablet, Rfl: 0  •  potassium chloride (K-DUR,KLOR-CON) 20 MEQ CR tablet, Take 40 mEq by mouth Every Evening., Disp: , Rfl:   •  pregabalin (LYRICA) 100 MG capsule, Take 1 capsule by mouth 3 (Three) Times a Day., Disp: 90 capsule, Rfl: 2  •  sertraline (ZOLOFT) 50 MG tablet, Take 50 mg by mouth Every Evening., Disp: , Rfl:   •  temazepam (RESTORIL) 30 MG capsule, TAKE 1 CAPSULE BY MOUTH AT NIGHT AS NEEDED FOR SLEEP., Disp: , Rfl:   •  traZODone (DESYREL) 50 MG tablet, Take 1 tablet by mouth Every Night., Disp: 30 tablet, Rfl: 1  •  triamterene-hydrochlorothiazide (DYAZIDE) 37.5-25 MG per capsule, TAKE 1 CAPSULE BY MOUTH EVERY DAY, Disp: 90 capsule, Rfl: 1  •  warfarin (COUMADIN) 5 MG tablet, Take 5.5 mg by mouth Daily., Disp: , Rfl:      Allergies   Allergen Reactions   • Morphine Nausea Only and Hives   • Penicillin V Potassium Rash   • Sulfa Antibiotics Rash   • Levaquin [Levofloxacin] Nausea Only and Dizziness     When taken with Coumadin   • Amoxicillin Rash   • Norco [Hydrocodone-Acetaminophen] Rash       Family History   Problem Relation Age of Onset   • Hypertension Mother    • Cancer Father    • Hypertension Father    • Hypertension Sister    • Hypertension Brother    • Stroke Brother        Cancer-related family history includes Cancer in her father.    Social History     Tobacco Use   • Smoking status: Never Smoker   • Smokeless tobacco: Never Used   Substance Use Topics   • Alcohol use: No     Frequency: Never     Comment: caffeine 32-64oz qd   • Drug use: No       I have reviewed and confirmed the accuracy of the patient's history: Chief complaint, HPI and ROS as entered by the MA/LPN/RN. Cindy Ball MD 10/12/20     SUBJECTIVE:    The patient is here for a follow up appointment.  Patient does not have any specific complaints today.  She was seen by her neurosurgeon at [facility].  Radiation therapy has been recommended        REVIEW OF SYSTEMS:  Review of Systems   Constitutional: Negative for chills and fever.   HENT: Negative for ear pain, mouth sores, nosebleeds and sore throat.    Eyes: Negative for photophobia and visual disturbance.   Respiratory: Negative for wheezing and stridor.    Cardiovascular: Negative for chest pain and palpitations.   Gastrointestinal: Negative for abdominal pain, diarrhea, nausea and vomiting.   Endocrine: Negative for cold intolerance and heat intolerance.   Genitourinary: Negative for dysuria and hematuria.   Musculoskeletal: Negative for joint swelling and neck stiffness.   Skin: Negative for color change.   Neurological: Negative for seizures and syncope.   Hematological: Negative for adenopathy.   Psychiatric/Behavioral: Negative for agitation, confusion and hallucinations.  "    ROS as documented above    OBJECTIVE:    Vitals:    10/12/20 0840   BP: 116/74   Pulse: 53   Resp: 18   Temp: 97.3 °F (36.3 °C)   TempSrc: Temporal   Weight: 80.3 kg (177 lb)   Height: 165.1 cm (65\")   PainSc: 0-No pain       ECOG  (1) Restricted in physically strenuous activity, ambulatory and able to do work of light nature    Physical Exam   Constitutional: She is oriented to person, place, and time. No distress.   HENT:   Head: Normocephalic and atraumatic.   Eyes: Conjunctivae are normal. Right eye exhibits no discharge. Left eye exhibits no discharge. No scleral icterus.   Neck: Normal range of motion. Neck supple. No thyromegaly present.   Cardiovascular: Normal rate, regular rhythm and normal heart sounds. Exam reveals no gallop and no friction rub.   Pulmonary/Chest: Effort normal. No stridor. No respiratory distress. She has no wheezes.   Abdominal: Soft. Bowel sounds are normal. She exhibits no mass. There is no abdominal tenderness. There is no rebound and no guarding.   Musculoskeletal: Normal range of motion. No tenderness.   Lymphadenopathy:     She has no cervical adenopathy.   Neurological: She is alert and oriented to person, place, and time. She exhibits normal muscle tone.   Skin: Skin is warm. She is not diaphoretic. No erythema.   Psychiatric: Her behavior is normal. Judgment and thought content normal.   Nursing note and vitals reviewed.    I have reexamined the patient and the results are consistent with the previously documented exam. Cindy Ball MD     RECENT LABS  WBC   Date Value Ref Range Status   10/12/2020 7.45 3.40 - 10.80 10*3/mm3 Final   07/05/2020 4.04 (L) 4.5 - 11.0 10*3/uL Final     RBC   Date Value Ref Range Status   10/12/2020 3.80 3.77 - 5.28 10*6/mm3 Final   07/05/2020 3.68 (L) 4.0 - 5.2 10*6/uL Final     Hemoglobin   Date Value Ref Range Status   10/12/2020 11.6 (L) 12.0 - 15.9 g/dL Final   07/05/2020 11.5 (L) 12.0 - 16.0 g/dL Final     Hematocrit   Date Value " Ref Range Status   10/12/2020 36.4 34.0 - 46.6 % Final   07/05/2020 35.3 (L) 36.0 - 46.0 % Final     MCV   Date Value Ref Range Status   10/12/2020 95.8 79.0 - 97.0 fL Final   07/05/2020 95.9 80.0 - 100.0 fL Final     MCH   Date Value Ref Range Status   10/12/2020 30.5 26.6 - 33.0 pg Final   07/05/2020 31.3 26.0 - 34.0 pg Final     MCHC   Date Value Ref Range Status   10/12/2020 31.9 31.5 - 35.7 g/dL Final   07/05/2020 32.6 31.0 - 37.0 g/dL Final     RDW   Date Value Ref Range Status   10/12/2020 17.6 (H) 12.3 - 15.4 % Final   07/05/2020 15.9 12.0 - 16.8 % Final     RDW-SD   Date Value Ref Range Status   10/12/2020 59.2 (H) 37.0 - 54.0 fl Final     MPV   Date Value Ref Range Status   10/12/2020 9.9 6.0 - 12.0 fL Final   07/05/2020 10.2 6.7 - 10.8 fL Final     Platelets   Date Value Ref Range Status   10/12/2020 181 140 - 450 10*3/mm3 Final   07/05/2020 158 140 - 440 10*3/uL Final     Neutrophil Rel %   Date Value Ref Range Status   07/05/2020 54.0 45 - 80 % Final     Neutrophil %   Date Value Ref Range Status   10/12/2020 60.0 42.7 - 76.0 % Final     Lymphocyte Rel %   Date Value Ref Range Status   07/05/2020 30.4 15 - 50 % Final     Lymphocyte %   Date Value Ref Range Status   10/12/2020 25.6 19.6 - 45.3 % Final     Monocyte Rel %   Date Value Ref Range Status   07/05/2020 12.4 0 - 15 % Final     Monocyte %   Date Value Ref Range Status   10/12/2020 11.1 5.0 - 12.0 % Final     Eosinophil %   Date Value Ref Range Status   10/12/2020 3.2 0.3 - 6.2 % Final   07/05/2020 3.0 0 - 7 % Final     Basophil Rel %   Date Value Ref Range Status   07/05/2020 0.2 0 - 2 % Final     Basophil %   Date Value Ref Range Status   10/12/2020 0.1 0.0 - 1.5 % Final     Immature Grans %   Date Value Ref Range Status   07/05/2020 0.0 0 % Final     Neutrophils Absolute   Date Value Ref Range Status   07/05/2020 2.18 2.0 - 8.8 10*3/uL Final     Neutrophils, Absolute   Date Value Ref Range Status   10/12/2020 4.46 1.70 - 7.00 10*3/mm3 Final      Lymphocytes Absolute   Date Value Ref Range Status   07/05/2020 1.23 0.7 - 5.5 10*3/uL Final     Lymphocytes, Absolute   Date Value Ref Range Status   10/12/2020 1.91 0.70 - 3.10 10*3/mm3 Final     Monocytes Absolute   Date Value Ref Range Status   07/05/2020 0.50 0.0 - 1.7 10*3/uL Final     Monocytes, Absolute   Date Value Ref Range Status   10/12/2020 0.83 0.10 - 0.90 10*3/mm3 Final     Eosinophils Absolute   Date Value Ref Range Status   07/05/2020 0.12 0.0 - 0.8 10*3/uL Final     Eosinophils, Absolute   Date Value Ref Range Status   10/12/2020 0.24 0.00 - 0.40 10*3/mm3 Final     Basophils Absolute   Date Value Ref Range Status   07/05/2020 0.01 0.0 - 0.2 10*3/uL Final     Basophils, Absolute   Date Value Ref Range Status   10/12/2020 0.01 0.00 - 0.20 10*3/mm3 Final     Immature Grans, Absolute   Date Value Ref Range Status   07/05/2020 0.00 <1 10*3/uL Final     nRBC   Date Value Ref Range Status   10/02/2020 0.1 0.0 - 0.2 /100 WBC Final       Lab Results   Component Value Date    GLUCOSE 134 (H) 10/02/2020    BUN  10/02/2020      Comment:      Testing performed by alternate method    BUN 26 (H) 10/02/2020    CREATININE 1.00 10/02/2020    EGFRIFNONA 56 (L) 10/02/2020    EGFRIFAFRI >60 06/07/2019    BCR  10/02/2020      Comment:      Testing not performed    K 4.1 10/02/2020    CO2 25.0 10/02/2020    CALCIUM 9.1 10/02/2020    ALBUMIN 3.90 09/30/2020    LABIL2 1.3 07/03/2020    AST 20 09/30/2020    ALT 11 09/30/2020           ASSESSMENT:    1. Recurrent ovarian cancer on carboplatinum, Doxil.  Patient had had carboplatinum Taxol, carbo Taxotere, Gemzar single agent, Niraparib and was on Doxil and carboplatinum.  Doxil was discontinued due to approaching of lifetime dosing.  Refer to paradigm testing for future options at time of progression.  Patient has had progression of disease and therefore platinum was discontinued.  Topotecan single agent has been recommended.   2. Currently on single agent  topotecan  3. Nonspecific lesions in the brain concern for possible small metastases 4 to 5 mm on the left parietal lobe, 8 mm nodular density in the dura on the left posterior fossa may be meningioma or calcified metastasis: Neurosurgery following Dr. Hartman.  Repeat MRI shows interval increase in the size of the 2 brain lesions.:  Refer to Dr. Delong as soon as possible  4. Iron deficiency anemia: Intermittently on  Iron  5. History of DVT on anticoagulation therapy with warfarin  6. Pancytopenia: Due to chemotherapy: On Procrit  7. Hypomagnesemia: Continue to monitor levels and infuse as needed  8. B12 deficiency on parenteral B12 injections  9. Rash on face likely drug induced:resolved  10. Monitoring for chemotherapy toxicities  11. Assessment have been reviewed and updated      DISCUSSION:    I have reviewed her restaging CT scans.  Patient has new lung nodules and also enlarging nodules in the abdomen and pelvis consistent with progressing disease.  For that reason we will discontinue single agent carboplatinum and transition patient to topotecan.  Also note patient was on carboplatinum every 4 weeks with Doxil.  Doxil was discontinued due to approaching lifetime maximum dosing.  In the future carboplatinum may be reused but at 3 weekly intervals.    Discussed side effects of chemotherapy to include but not limited to  Chemotherapy side effects include, but not limited to, nausea, vomiting, bone marrow suppression, which can result in blood, platelet transfusion. There is also risk of permanent bone marrow destruction, which can cause myelodysplastic side effects of her new chemotherapy or leukemia years down the line. There is risk of infection which can result in hospitalization and even death. There is also risk of fatigue, asthenia, alopecia which could become permanent. Chemo will help to reduce risk of relapse of cancer, but does not eliminate risk completely.    Specifically topotecan can lead to severe bone  marrow suppression which may result in platelet transfusions    She has had interval increase in the size of the 2 brain  lesions, will refer to Dr. Delong      PLANS:       Refer to Dr. Delong for brain metastases: She may be a candidate for stereotactic radiation  Chemotherapy with topotecan but with 20% dose reduction due to cytopenias: Hold until radiation is completed   monthly: Check levels today.  Ongoing  Continue magnesium oxide 400 mg three times a day and weekly IV replacement as needed.   Continue Coumadin: Increase warfarin to 6 mg daily.  INR in 1 week  Continue b12 injections IM monthly. Transitioned injections to be done at the cancer center  Continue Retacrit 40,000 units subcu weekly for hemoglobin less than 10, For chemotherapy-induced anemia  Magic mouthwash PRN for mucositis  All questions answered to the best of my abilities  Follow-up in 2 weeks           I have reviewed labs results, imaging, vitals, and medications with the patient today. Will follow up in 2 weeks  with me.    All questions answered to the best of my abilities    Patient verbalized understanding and is in agreement of the above plans.      I spent 40 total minutes, face-to-face, caring for Tahira today.  90% of this time involved counseling and/or coordination of care as documented within this note.

## 2020-10-12 ENCOUNTER — READMISSION MANAGEMENT (OUTPATIENT)
Dept: CALL CENTER | Facility: HOSPITAL | Age: 65
End: 2020-10-12

## 2020-10-12 ENCOUNTER — OFFICE VISIT (OUTPATIENT)
Dept: FAMILY MEDICINE CLINIC | Facility: CLINIC | Age: 65
End: 2020-10-12

## 2020-10-12 ENCOUNTER — OFFICE VISIT (OUTPATIENT)
Dept: ONCOLOGY | Facility: CLINIC | Age: 65
End: 2020-10-12

## 2020-10-12 ENCOUNTER — TELEPHONE (OUTPATIENT)
Dept: RADIATION ONCOLOGY | Facility: HOSPITAL | Age: 65
End: 2020-10-12

## 2020-10-12 ENCOUNTER — HOSPITAL ENCOUNTER (OUTPATIENT)
Dept: ONCOLOGY | Facility: HOSPITAL | Age: 65
Discharge: HOME | End: 2020-10-12

## 2020-10-12 ENCOUNTER — LAB (OUTPATIENT)
Dept: LAB | Facility: HOSPITAL | Age: 65
End: 2020-10-12

## 2020-10-12 VITALS
RESPIRATION RATE: 18 BRPM | BODY MASS INDEX: 29.49 KG/M2 | DIASTOLIC BLOOD PRESSURE: 74 MMHG | HEART RATE: 53 BPM | WEIGHT: 177 LBS | SYSTOLIC BLOOD PRESSURE: 116 MMHG | HEIGHT: 65 IN | TEMPERATURE: 97.3 F

## 2020-10-12 VITALS
DIASTOLIC BLOOD PRESSURE: 81 MMHG | BODY MASS INDEX: 29.62 KG/M2 | WEIGHT: 178 LBS | OXYGEN SATURATION: 97 % | TEMPERATURE: 97.1 F | HEART RATE: 59 BPM | SYSTOLIC BLOOD PRESSURE: 139 MMHG

## 2020-10-12 DIAGNOSIS — T45.1X5A PANCYTOPENIA DUE TO ANTINEOPLASTIC CHEMOTHERAPY (HCC): ICD-10-CM

## 2020-10-12 DIAGNOSIS — Z79.01 LONG TERM (CURRENT) USE OF ANTICOAGULANTS: ICD-10-CM

## 2020-10-12 DIAGNOSIS — D61.810 PANCYTOPENIA DUE TO ANTINEOPLASTIC CHEMOTHERAPY (HCC): Primary | ICD-10-CM

## 2020-10-12 DIAGNOSIS — C79.31 BRAIN METASTASES: ICD-10-CM

## 2020-10-12 DIAGNOSIS — D61.810 PANCYTOPENIA DUE TO ANTINEOPLASTIC CHEMOTHERAPY (HCC): ICD-10-CM

## 2020-10-12 DIAGNOSIS — T45.1X5A PANCYTOPENIA DUE TO ANTINEOPLASTIC CHEMOTHERAPY (HCC): Primary | ICD-10-CM

## 2020-10-12 DIAGNOSIS — I82.722 CHRONIC DEEP VEIN THROMBOSIS (DVT) OF LEFT UPPER EXTREMITY, UNSPECIFIED VEIN (HCC): ICD-10-CM

## 2020-10-12 DIAGNOSIS — C56.1 MALIGNANT NEOPLASM OF RIGHT OVARY (HCC): ICD-10-CM

## 2020-10-12 DIAGNOSIS — R79.1 SUPRATHERAPEUTIC INR: Primary | ICD-10-CM

## 2020-10-12 LAB
BASOPHILS # BLD AUTO: 0.01 10*3/MM3 (ref 0–0.2)
BASOPHILS NFR BLD AUTO: 0.1 % (ref 0–1.5)
CANCER AG125 SERPL QL: 75.5 U/ML (ref 0–38.1)
DEPRECATED RDW RBC AUTO: 59.2 FL (ref 37–54)
EOSINOPHIL # BLD AUTO: 0.24 10*3/MM3 (ref 0–0.4)
EOSINOPHIL NFR BLD AUTO: 3.2 % (ref 0.3–6.2)
ERYTHROCYTE [DISTWIDTH] IN BLOOD BY AUTOMATED COUNT: 17.6 % (ref 12.3–15.4)
HCT VFR BLD AUTO: 36.4 % (ref 34–46.6)
HGB BLD-MCNC: 11.6 G/DL (ref 12–15.9)
INR PPP: 1.2
LYMPHOCYTES # BLD AUTO: 1.91 10*3/MM3 (ref 0.7–3.1)
LYMPHOCYTES NFR BLD AUTO: 25.6 % (ref 19.6–45.3)
MCH RBC QN AUTO: 30.5 PG (ref 26.6–33)
MCHC RBC AUTO-ENTMCNC: 31.9 G/DL (ref 31.5–35.7)
MCV RBC AUTO: 95.8 FL (ref 79–97)
MONOCYTES # BLD AUTO: 0.83 10*3/MM3 (ref 0.1–0.9)
MONOCYTES NFR BLD AUTO: 11.1 % (ref 5–12)
NEUTROPHILS NFR BLD AUTO: 4.46 10*3/MM3 (ref 1.7–7)
NEUTROPHILS NFR BLD AUTO: 60 % (ref 42.7–76)
PLATELET # BLD AUTO: 181 10*3/MM3 (ref 140–450)
PMV BLD AUTO: 9.9 FL (ref 6–12)
RBC # BLD AUTO: 3.8 10*6/MM3 (ref 3.77–5.28)
WBC # BLD AUTO: 7.45 10*3/MM3 (ref 3.4–10.8)

## 2020-10-12 PROCEDURE — 99215 OFFICE O/P EST HI 40 MIN: CPT | Performed by: INTERNAL MEDICINE

## 2020-10-12 PROCEDURE — 85025 COMPLETE CBC W/AUTO DIFF WBC: CPT

## 2020-10-12 PROCEDURE — 86304 IMMUNOASSAY TUMOR CA 125: CPT

## 2020-10-12 PROCEDURE — 99495 TRANSJ CARE MGMT MOD F2F 14D: CPT | Performed by: FAMILY MEDICINE

## 2020-10-12 PROCEDURE — 85610 PROTHROMBIN TIME: CPT

## 2020-10-12 PROCEDURE — 36415 COLL VENOUS BLD VENIPUNCTURE: CPT

## 2020-10-12 NOTE — OUTREACH NOTE
Medical Week 2 Survey      Responses   [facility] patient discharged from?  [facility]   Does the patient have one of the following disease processes/diagnoses(primary or secondary)?  Other   Week 2 attempt successful?  No   Unsuccessful attempts  Attempt 1          Carlee Yee LPN

## 2020-10-12 NOTE — TELEPHONE ENCOUNTER
Called [facility] at [phone]. Requested images of MRI of the brain 9/2/2020, Ct chest abdomen and pelvis 7/4/2020 and MRI of the brain 7/3/2020 be pushed through pacs system.

## 2020-10-13 ENCOUNTER — CONSULT (OUTPATIENT)
Dept: RADIATION ONCOLOGY | Facility: HOSPITAL | Age: 65
End: 2020-10-13

## 2020-10-13 VITALS
HEART RATE: 56 BPM | HEIGHT: 65 IN | RESPIRATION RATE: 18 BRPM | WEIGHT: 180.6 LBS | OXYGEN SATURATION: 98 % | SYSTOLIC BLOOD PRESSURE: 131 MMHG | DIASTOLIC BLOOD PRESSURE: 76 MMHG | TEMPERATURE: 97.5 F | BODY MASS INDEX: 30.09 KG/M2

## 2020-10-13 DIAGNOSIS — C79.31 BRAIN METASTASES: Primary | ICD-10-CM

## 2020-10-13 PROCEDURE — 99205 OFFICE O/P NEW HI 60 MIN: CPT | Performed by: RADIOLOGY

## 2020-10-13 NOTE — PROGRESS NOTES
"Radiation Oncology Consult Note    Name: Tahira Zimmerman  YOB: 1955  MRN #: [id number]  Date of Service: 10/13/2020  Referring Provider: Cindy Ball MD  Primary Care Provider: Noemy Queen MD      DIAGNOSIS: Recurrent ovarian cancer, originally diagnosed 06/2013, now with 2 brain metastases  Encounter Diagnosis   Name Primary?   • Brain metastases (CMS/HCC) Yes       REASON FOR CONSULTATION/CHIEF COMPLAINT:  \"Enlarging brain mets\"  I was asked to see the patient at the request of the referring provider noted below for advice and recommendations regarding this diagnosis and the role of radiation therapy.                              REQUESTING PHYSICIAN:  No referring provider defined for this encounter.    RECORDS OBTAINED:  Records of the patients history including those obtained from the referring provider were reviewed and summarized in detail.    HISTORY OF PRESENT ILLNESS:  Tahira Zimmerman is a 65 y.o. female who has been a   Longstanding patient of Dr. Ball, having received in her lifetime, Carboplatinum, taxol, taxotere, gemzar single agent, niraparib and recently doxil/carbo.      In May 2020, she had a dizzy spell and feel, with ER visit at [facility].  This lead to a 7/3/2020 MRI brain---    Recently, she has been followed by the team at [facility] Neurosurgery for nonspecific lesions in the brain concerning for possible small metastases, a 4 mm left parietal lobe area and an 8 mm nodular density in the dura on the left posterior fossa, with calcified metastasis as a differential.     She was asymptomatic and saw Dr. Hartman and recommended short interval MRI to assess these lesion. I was to see her then but she elected to just follow with Dr. Hartman.    She saw him on 09/18/2020 with repeat MRI brain having been performed on 09/02/2020--- Interval increase in size of metastasis in the left parietal cortex, measuring 9 x 7 x7 mm, increased from 4 mm; additionally the left superior " cerebellar metastasis has also increased to a size of 13 x 12 x 12 mm, up from 8 mm.  No new metastases are demonstrated.    Her symptoms are generalized fatigue and intermittent, non-severe headaches.    Dr. Hartman had referred her to Dr. Sandra at [facility] for SRS and she has missed those appointments/assessment.    She was admitted at [facility] from 09/30/2020 until 10/2/202 with supratherapeutic INR.      Again she has most recently been on single agent topotecan and CT scans have shown progression.    She is motivated for therapy and NGS testing is ongoing per Dr. Ball's note.      The following portions of the patient's history were reviewed and updated as appropriate: allergies, current medications, past family history, past medical history, past social history, past surgical history and problem list. Reviewed with the patient and remain unchanged.    PAST MEDICAL HISTORY:  she  has a past medical history of Anemia (2013), Cervical disc disorder (2013), Clotting disorder (CMS/HCC) (2013), Colon polyp (2013), Deep vein thrombosis (CMS/HCC) (2013), Diverticulosis (2013), GERD (gastroesophageal reflux disease) (2016), HL (hearing loss) (2016), Hyperlipidemia (2013), Hypertension, Joint pain (2013), Low back pain (2013), Neuromuscular disorder (CMS/HCC) (2015), Osteopenia (2014), Osteoporosis, Ovarian cancer (CMS/[facility]), Ovarian cancer on left (CMS/[facility]) (1995), Pneumonia (2010), Spinal stenosis (2013), and Urinary tract infection (2013).  MEDICATIONS:   Current Outpatient Medications:   •  acetaminophen (TYLENOL) 500 MG tablet, Take 1,000 mg by mouth Every 6 (Six) Hours As Needed for Mild Pain ., Disp: , Rfl:   •  atorvastatin (LIPITOR) 20 MG tablet, Take 20 mg by mouth every night at bedtime., Disp: , Rfl: 3  •  cyanocobalamin 1000 MCG/ML injection, Inject 1,000 mcg into the appropriate muscle as directed by prescriber Every 28 (Twenty-Eight) Days., Disp: , Rfl:   •  famotidine (PEPCID) 40 MG tablet, TAKE 1 TABLET BY  MOUTH EVERY MORNING BEFORE BREAKFAST, Disp: 90 tablet, Rfl: 1  •  loperamide (Imodium A-D) 2 MG tablet, Take 1 tablet by mouth 3 (Three) Times a Day As Needed for Diarrhea., Disp: 60 tablet, Rfl: 2  •  magnesium oxide (MAG-OX) 400 MG tablet, TAKE 1 TABLET BY MOUTH TWICE A DAY, Disp: 180 tablet, Rfl: 1  •  ondansetron ODT (ZOFRAN-ODT) 8 MG disintegrating tablet, Place 1 tablet under the tongue Every 8 (Eight) Hours As Needed for Nausea or Vomiting., Disp: 30 tablet, Rfl: 3  •  oxyCODONE (ROXICODONE) 10 MG tablet, Take 1 tablet by mouth 3 (Three) Times a Day As Needed for Moderate Pain ., Disp: 90 tablet, Rfl: 0  •  potassium chloride (K-DUR,KLOR-CON) 20 MEQ CR tablet, Take 40 mEq by mouth Every Evening., Disp: , Rfl:   •  pregabalin (LYRICA) 100 MG capsule, Take 1 capsule by mouth 3 (Three) Times a Day., Disp: 90 capsule, Rfl: 2  •  sertraline (ZOLOFT) 50 MG tablet, Take 50 mg by mouth Every Evening., Disp: , Rfl:   •  temazepam (RESTORIL) 30 MG capsule, TAKE 1 CAPSULE BY MOUTH AT NIGHT AS NEEDED FOR SLEEP., Disp: , Rfl:   •  traZODone (DESYREL) 50 MG tablet, Take 1 tablet by mouth Every Night., Disp: 30 tablet, Rfl: 1  •  triamterene-hydrochlorothiazide (DYAZIDE) 37.5-25 MG per capsule, TAKE 1 CAPSULE BY MOUTH EVERY DAY, Disp: 90 capsule, Rfl: 1  •  warfarin (COUMADIN) 5 MG tablet, Take 5.5 mg by mouth Daily., Disp: , Rfl:   ALLERGIES:   Allergies   Allergen Reactions   • Morphine Nausea Only and Hives   • Penicillin V Potassium Rash   • Sulfa Antibiotics Rash   • Levaquin [Levofloxacin] Nausea Only and Dizziness     When taken with Coumadin   • Amoxicillin Rash   • Norco [Hydrocodone-Acetaminophen] Rash     PAST SURGICAL HISTORY: she has a past surgical history that includes Hysterectomy; Hernia repair; Colon surgery; Colonoscopy (05/26/2018); Exploratory laparotomy; and Oophorectomy.  PREVIOUS RADIOTHERAPY OR CHEMOTHERAPY: Chemotherapy as noted; no prior brain radiation.  FAMILY HISTORY: her family history  "includes Cancer in her father; Hypertension in her brother, father, mother, and sister; Stroke in her brother.  SOCIAL HISTORY: she  reports that she has never smoked. She has never used smokeless tobacco. She reports that she does not drink alcohol or use drugs.  PAIN AND PAIN MANAGEMENT: No headaches or pain at this time.  Vitals:    10/13/20 1245   BP: 131/76   Pulse: 56   Resp: 18   Temp: 97.5 °F (36.4 °C)   TempSrc: Oral   SpO2: 98%   Weight: 81.9 kg (180 lb 9.6 oz)   Height: 165.1 cm (65\")   PainSc: 0-No pain     NUTRITIONAL STATUS:  no issues  KPS: 70        PHQ-9 Total Score: No issues.     Review of Systems:     Headaches--tylenol managed; has Oxycodone (but not using)  Bruise easiliy  Trouble seeing for months---blurry vision  Otherwise negative as below.     General: No fevers, chills, weight change, or drenching night sweats. Skin: No rashes or jaundice.  HEENT: No change in vision or hearing, no headaches.  Neck: No dysphagia or masses.  Heme/Lymph: No easy bruising or bleeding.  Respiratory System: No shortness of breath or cough.  Cardiovascular: No chest pain, palpitations, or dyspnea on exertion.  - Pacemaker. GI: No nausea, vomiting, diarrhea, melena, or hematochezia.  : No dysuria or hematuria.  Endocrine: No heat or cold intolerance. Musculoskeletal: No myalgias or arthralgias.  Neuro: No weakness, numbness, syncope, or seizures. Psych: No mood changes or depression. Ext: Denies swelling.        Objective     Vitals:  Vitals:    10/13/20 1245   BP: 131/76   Pulse: 56   Resp: 18   Temp: 97.5 °F (36.4 °C)   TempSrc: Oral   SpO2: 98%   Weight: 81.9 kg (180 lb 9.6 oz)   Height: 165.1 cm (65\")   PainSc: 0-No pain         PHYSICAL EXAM:  GENERAL: in no apparent distress, sitting comfortably in room.    HEENT: normocephalic, atraumatic. Pupils are equal, round, reactive to light. Sclera anicteric. Conjunctiva not injected. Oropharynx without erythema, ulcerations or thrush.   NECK: Supple with no " masses.  LYMPHATIC: no cervical, supraclavicular or axillary adenopathy appreciated bilaterally.   CARDIOVASCULAR: S1 & S2 detected; no murmurs, rubs or gallops.  CHEST: clear to auscultation bilaterally; no wheezes, crackles or rubs. Work of breathing normal.  ABDOMEN: bowel sounds present. Abdomen is soft, nontender, nondistended.   MUSCULOSKELETAL: no tenderness to palpation along the spine or scapulae. Normal range of motion.  EXTREMITIES: no clubbing, cyanosis, edema.  SKIN: no erythema, rashes, ulcerations noted.   NEUROLOGIC: cranial nerves II-XII grossly intact bilaterally. No focal neurologic deficits. Gait and station stable, normal. Strength intact.  PSYCHIATRIC:  alert, aware, and appropriate.      PERTINENT IMAGING/PATHOLOGY/LABS (Medical Decision Making): Discussed with Dr. Ball.    COORDINATION OF CARE: A copy of this note is sent to the referring provider.    PATHOLOGY (Reviewed):     IMAGING (Reviewed): AS noted above, needs MRI SRS 1 mm protocol for potential SRS.    LABS (Reviewed):  Hematology WBC   Date Value Ref Range Status   10/12/2020 7.45 3.40 - 10.80 10*3/mm3 Final   07/05/2020 4.04 (L) 4.5 - 11.0 10*3/uL Final     RBC   Date Value Ref Range Status   10/12/2020 3.80 3.77 - 5.28 10*6/mm3 Final   07/05/2020 3.68 (L) 4.0 - 5.2 10*6/uL Final     Hemoglobin   Date Value Ref Range Status   10/12/2020 11.6 (L) 12.0 - 15.9 g/dL Final   07/05/2020 11.5 (L) 12.0 - 16.0 g/dL Final     Hematocrit   Date Value Ref Range Status   10/12/2020 36.4 34.0 - 46.6 % Final   07/05/2020 35.3 (L) 36.0 - 46.0 % Final     Platelets   Date Value Ref Range Status   10/12/2020 181 140 - 450 10*3/mm3 Final   07/05/2020 158 140 - 440 10*3/uL Final      Chemistry   Lab Results   Component Value Date    GLUCOSE 134 (H) 10/02/2020    BUN  10/02/2020      Comment:      Testing performed by alternate method    BUN 26 (H) 10/02/2020    CREATININE 1.00 10/02/2020    EGFRIFNONA 56 (L) 10/02/2020    EGFRIFAFRI >60 06/07/2019     BCR  10/02/2020      Comment:      Testing not performed    K 4.1 10/02/2020    CO2 25.0 10/02/2020    CALCIUM 9.1 10/02/2020    ALBUMIN 3.90 09/30/2020    LABIL2 1.3 07/03/2020    AST 20 09/30/2020    ALT 11 09/30/2020       Assessment/Plan     ASSESSMENT AND PLAN:  1. Brain metastases (CMS/HCC)       2 brain metastases  -Growing on interval imaging.  CT non-CNS disease with interval progression.  Off single agent irinotecan.  Will get NGS.  -SRS will be before next therapy.    MRI brain SRS protocol ordered.    CT simulation will likely be on 10/19/2020 with treatment to follow. Dosing will be determined by MRI brain, # and size of lesions.      This assessment comes from my review of the imaging, pathology, physician notes and other pertinent information as mentioned.    DISPOSITION: CT simulation ordered.        TIME SPENT WITH PATIENT:   I spent greater than 60 minutes in face-to-face time with the patient and greater than 60% of those minutes were spent in counseling and coordination of care, including review of imaging and pathology; indications, goals, logistics, alternatives and risks - both common and rare - for my recommendations as well as surveillance and potential outcomes.         CC: MD Cindy Faye MD David Sun, MD John A. Cox, MD  10/13/2020  12:47 PM EDT      Addendum MRI brain 10/16/2020, independently reviewed:  Left parietal lobe lesion has increased to 1.0 x 0.9 cm in size, the left cerebellar lesion now measures 1.5 x 1.4 cm. No elsewhere mets.

## 2020-10-16 ENCOUNTER — HOSPITAL ENCOUNTER (OUTPATIENT)
Dept: MRI IMAGING | Facility: HOSPITAL | Age: 65
Discharge: HOME | End: 2020-10-16
Admitting: RADIOLOGY

## 2020-10-16 DIAGNOSIS — C79.31 BRAIN METASTASES: ICD-10-CM

## 2020-10-16 PROCEDURE — A9579 GAD-BASE MR CONTRAST NOS,1ML: HCPCS | Performed by: RADIOLOGY

## 2020-10-16 PROCEDURE — 70553 MRI BRAIN STEM W/O & W/DYE: CPT

## 2020-10-16 PROCEDURE — 25010000002 GADOTERIDOL PER 1 ML: Performed by: RADIOLOGY

## 2020-10-16 RX ADMIN — GADOTERIDOL 20 ML: 279.3 INJECTION, SOLUTION INTRAVENOUS at 11:00

## 2020-10-18 DIAGNOSIS — G25.81 RESTLESS LEGS SYNDROME: ICD-10-CM

## 2020-10-19 ENCOUNTER — LAB (OUTPATIENT)
Dept: LAB | Facility: HOSPITAL | Age: 65
End: 2020-10-19

## 2020-10-19 ENCOUNTER — HOSPITAL ENCOUNTER (OUTPATIENT)
Dept: RADIATION ONCOLOGY | Facility: HOSPITAL | Age: 65
Setting detail: RADIATION/ONCOLOGY SERIES
End: 2020-10-19

## 2020-10-19 ENCOUNTER — HOSPITAL ENCOUNTER (OUTPATIENT)
Dept: ONCOLOGY | Facility: HOSPITAL | Age: 65
Setting detail: INFUSION SERIES
Discharge: HOME | End: 2020-10-19

## 2020-10-19 DIAGNOSIS — I82.722 CHRONIC DEEP VEIN THROMBOSIS (DVT) OF LEFT UPPER EXTREMITY, UNSPECIFIED VEIN (HCC): ICD-10-CM

## 2020-10-19 DIAGNOSIS — Z79.01 LONG TERM (CURRENT) USE OF ANTICOAGULANTS: Primary | ICD-10-CM

## 2020-10-19 LAB — INR PPP: 2.1

## 2020-10-19 PROCEDURE — 85610 PROTHROMBIN TIME: CPT

## 2020-10-19 RX ORDER — TEMAZEPAM 30 MG/1
CAPSULE ORAL
Qty: 30 CAPSULE | Refills: 1 | Status: SHIPPED | OUTPATIENT
Start: 2020-10-19 | End: 2020-12-09 | Stop reason: SDUPTHER

## 2020-10-20 ENCOUNTER — HOSPITAL ENCOUNTER (OUTPATIENT)
Dept: RADIATION ONCOLOGY | Facility: HOSPITAL | Age: 65
Discharge: HOME | End: 2020-10-20

## 2020-10-20 PROCEDURE — 77263 THER RADIOLOGY TX PLNG CPLX: CPT | Performed by: RADIOLOGY

## 2020-10-20 PROCEDURE — 77470 SPECIAL RADIATION TREATMENT: CPT | Performed by: RADIOLOGY

## 2020-10-20 PROCEDURE — 77334 RADIATION TREATMENT AID(S): CPT | Performed by: RADIOLOGY

## 2020-10-21 DIAGNOSIS — Z00.00 WELCOME TO MEDICARE PREVENTIVE VISIT: ICD-10-CM

## 2020-10-21 DIAGNOSIS — Z12.31 ENCOUNTER FOR SCREENING MAMMOGRAM FOR BREAST CANCER: ICD-10-CM

## 2020-10-22 PROCEDURE — 77338 DESIGN MLC DEVICE FOR IMRT: CPT | Performed by: RADIOLOGY

## 2020-10-22 PROCEDURE — 77301 RADIOTHERAPY DOSE PLAN IMRT: CPT | Performed by: RADIOLOGY

## 2020-10-22 PROCEDURE — 77300 RADIATION THERAPY DOSE PLAN: CPT | Performed by: RADIOLOGY

## 2020-10-23 PROCEDURE — 77373 STRTCTC BDY RAD THER TX DLVR: CPT | Performed by: RADIOLOGY

## 2020-10-23 PROCEDURE — 77336 RADIATION PHYSICS CONSULT: CPT | Performed by: RADIOLOGY

## 2020-10-23 PROCEDURE — 77435 SBRT MANAGEMENT: CPT | Performed by: RADIOLOGY

## 2020-10-23 NOTE — PROGRESS NOTES
Hematology/Oncology Outpatient Follow Up    PATIENT NAME:Tahira Zimmerman  :1955  MRN: [id number]  PRIMARY CARE PHYSICIAN: Noemy Queen MD  REFERRING PHYSICIAN: Noemy Queen MD    Chief Complaint   Patient presents with   • Follow-up     Malignant neoplasm right ovary        HISTORY OF PRESENT ILLNESS:     1. Recurrent ovarian cancer diagnosis established in 2013.  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  The patient was treated with surgery and then postoperatively with adjuvant chemotherapy consisting of Carboplatin and Taxol.  Six months later, she had recurrence of disease intra-abdominally between the rectum and vagina, which was treated with intraabdominal peritoneal chemotherapy.  She had been free of disease since that time.  She reported feeling a mass in the left side of her abdomen approximately six months ago.  This gradually grew and in early 2013 she had her daughter feel the mass.  The daughter works for Dr. David Rogers and the patient at that time also complained of intermittent rectal bleeding.  Consultation with Dr. David Rogers was obtained.  The patient had a CT scan of the abdomen and pelvis performed on 13 revealing left lower quadrant mass measuring 9.7 x 9.3 x 6 cm demonstrating areas of dense calcification, soft tissue density and cystic density within it.  Stromal tumor is felt to be most likely a possibly fibroma.  It is generated with necrosis and calcification.  Possible palpable mass in the rectus muscle is seen with calcification and deformity of the rectus muscle and just below this a second mass intra-abdominally was seen measuring 2 cm in the greatest diameter suspicious for second intraabdominal tumor.  The mass separate from the largest mass measuring 2.6 cm in the dependent portion of pelvis is seen on the right of the midline.  No  retroperitoneal lymphadenopathy was seen.  No free air, free fluid or other abnormality was present.  The patient was scheduled for an outpatient colonoscopy, but had syncopal episode while sitting in the toilet the night before and was brought to the emergency room early in the morning by her daughter and son-in-law.  The patient had apparently stopped breathing for a few seconds and did not have a pulse, but started breathing before CPR could be initiated.  In the emergency room, the patient had an EKG revealing sinus rhythm nonspecific T-wave flattening; metabolic panel revealed a glucose of 148, creatinine of 1.3, CBC was normal.  She was treated with intravenous fluid.  The patient’s case was then discussed with Dr. Anabell Monique and patient was subsequently admitted to [facility].  The patient underwent a colonoscopy by Dr. David Rogers on 06/27/13 revealing sigmoid diverticulosis and grade II internal and external hemorrhoids, but no mass or colitis was seen.  CT-guided biopsy of the mass was performed on 06/27/13 as well revealing metastatic papillary adenocarcinoma with numerous psammoma bodies.  Pathology comment stated prior records were not available; however, with history of ovarian cancer the current biopsy would be consistent with metastases from that malignancy.  Oncology consult was obtained and I initially saw the patient on 06/27/2013 where the patient had claimed to have little bit of pain in the area of disease.  She reported being frequently constipated, denies any weight loss or fevers.  She did report having some night sweats, but thought that was because of her menopause.  On 06/27/13 metastatic workup including labs and CT scans were initiated.  CT scan of the chest on 06/27/13 revealed no acute disease in the chest.  A 1.4 cm size focal area of decreased density was noted in the lateral aspect of the right lobe of the liver consistent with a benign cyst or hemangioma.   There was a very small hiatal hernia measuring 2.2 cm in diameter.  A CT scan of the head performed 06/27/13 revealed no acute intracranial abnormality noted.  CA-125 was 40 units/ml (0-35), CA 19-9 was 11.8 units/ml (0-35), CEA level was 0.8 ng/ml (0-3), TSH level was 1.09 IU/ml (0.34-5.6).  The patient was in workup for the syncopal episode, a carotid Doppler was performed on June 28 revealing an essentially normal study showing a reduction in diameter from 0-15% bilaterally.  A Holter monitor was completed on 06/27/13, which was unremarkable except for a few premature atrial contractions.  The patient was felt stable and subsequently was discharged home on 06/28/13.  Post discharge, the patient was seen at [facility] on 07/05/13 by Dr. Kathryn Thrasher who discussed treatment options with the patient including surgery.  The patient is in the office today 07/16/13 in follow up post hospitalization.  She is reporting that surgery is planned for July 30th of this month at .  · 7/30/13 to 8/3/13 - The patient was in [facility].  The patient was admitted for surgery for her recurrent ovarian cancer.  The patient had exploratory laparotomy, omentectomy, bowel resection, liver biopsy and appendectomy.  Pathology revealed of the omentum metastatic serous carcinoma of the transverse colon, segmental resection showed metastatic serous carcinoma involving mesenteric fat.  The liver wedge resection showed fibrosclerotic thickening of the hepatic capsule.  Benign liver parenchyma.  No evidence of metastatic tumor.  Appendix showed fibrous obliteration of the lumen.  Negative for metastatic carcinoma.  Rectum and sigmoid colon segmental resection showed metastatic serous carcinoma invading the colorectal mucosa uninvolved by tumor.  Vaginal mass showed metastatic serous carcinoma.  Margins are positive for tumor.  · 9/24/13 - Chemotherapy orders were written for patient to start  carboplatin 550 mg IV day one and Taxol 330 mg IV day one to be cycled every three weeks.  · 10/10/13 - The patient started cycle #1 of chemotherapy consisting of Carboplatin and Taxol.  · 09/25/13 -  is 10.6 normal.  · 10/01/13 - CT of the chest, abdomen and pelvis revealed new reticular nodular occupancy in the anterior segment of the right upper lobe, could reflect an inflammation or infectious etiology or could reflect unusual persistence of metastatic tumor.  New liver lesions, the smaller one appears to be implant on the surface of the liver, the larger may also represent an implant including the intrahepatic.  Several small mesenteric nodes seen throughout the abdomen and pelvis.  · 10/02/13 - Port placed by Dr. Sen.  · 10/24/13 - Orders written to start Neupogen daily and if more than three Neupogen are needed to give Neulasta after next chemo.  ANC is 0.15.  · 10/31/14 - Patient given cycle 2 of chemotherapy with Taxol and Carboplatin.  · 11/21/13 - The patient started cycle 3 of chemotherapy consisting of Carboplatin and Taxol.  · 11/26/13 - CT scan of chest, abdomen and pelvis revealed no evidence of active disease in the chest.  Reticulonodular opacity has resolved.  Metastatic lesion impressing upon the hepatic dome similar to prior exam.  No evidence of a change.  No evidence of new disease.  Postsurgical changes in the pelvis and throughout the retroperitoneum in the large bowel.  Finding containing anterior abdominal wall hernia containing fat tissue and enhancing vessels, 3 cm in diameter.  · 12/2/13 - Orders written to start Neupogen per protocol as well as Aranesp due to low hemoglobin and ANC.  ANC is 0.14.  Hemoglobin is 9.7.  · 12/11/13 - Orders written to hold chemotherapy until next week and decrease dose by 20% due to low platelet count.  Platelet count is 85,000.  · 12/16/13 - The patient received cycle 4 of Carboplatin and Taxol at a 20% dose reduction so Taxol dose is 260 mg and  Carboplatin is 440 mg.  · 12/16/13 - CA-125 12.6 (N).  · 12/19/13 - PET/CT scan.  Impression:  1. There is no evidence of hypermetabolism in the liver.  Specifically of the dominant lesion in or adjacent to the right hepatic dome that was seen and described on the recent CT study of 11/26/2013.  It is photopenic relative to the surrounding liver.  2.  There is no evidence of hypermetabolic soft issue mass lesion or free fluid in the abdomen or pelvis.  3.  The tonsillar tissues are slightly enlarged and have moderate activity level.  This maybe physiologic.  Correlation with oral cavity, examination is recommended.  4.  Mild amount of activity in the right level II jugular lymph chain without focally enlarged lymph nodes is of questionable significance.  · 1/6/13 - The patient received cycle 5 of chemotherapy with Taxol and Carboplatin.  · 1/27/14 - The patient started on cycle 6 of Taxol and Carboplatin.  · 2/18/14 - CT of the abdomen and pelvis with contrast; stable lesions impressing upon the hepatic dome, unchanged compared to the prior exam.  Multiple anterior abdominal wall hernias re-demonstrated.  Those above the umbilicus contain omental fat.  Below and to the left of the umbilicus as anterior abdominal hernia containing omental fat in nondilated small bowel which is larger than seen previously.  · 2/20/14 - The patient received cycle 7 of chemotherapy with Taxol and Carboplatin.   · 3/11/14 - Order written to discontinue chemotherapy due to patient has completed 7 cycles of chemotherapy and CT scans suggest possible remission.  · 3/11/14 -  11.0 (N).  · 06/05/14 - CT scan of the chest abdomen and pelvis with contrast: There are multiple anterior abdominal wall hernias.  There are 2 larger anterior abdominal wall hernias at the level of the transverse colon, which contain omental fat.  There are at least 2 small anterior abdominal wall hernias that contain omental fat.  There is a moderate sized  anterior abdominal wall hernia at the level of the umbilicus, eccentric to the left, which contain non-dilated bowel.  Interval decrease in the size of two deposit impressing on the liver.  The third tiny deposit has been stable.  · 8/19/14 -  10.4 (N).  · 12/19/14 -   10.0 (N)  · 1/7/15 - CT chest, abdomen and pelvis with stable appearance of the chest with several micronodules. Small hiatal hernia, slightly larger than previous exam, increased from 1.5 to 2.5 cm in diameter. Bochdalek hernia unchanged. Multiple hepatic lesions. The two dominant lesions at the right hepatic dome had decreased in size from previous. The remaining tiny nodules measured 3-5 mm in diameter and were unchanged. There was no evidence of new intra-abdominal or pelvic mass or free fluid. There were multiple anterior abdominal wall hernias which had increased in size.   · 7/27/15 - Comprehensive metabolic panel with no significant abnormalities. CA-125 of 21 (0-35).   · 10/26/15 - WBC 6, hemoglobin 13, platelet count 204,000. Heart rate 47. CA-125 of 20 (0-35).    · 2/18/16 - CT chest with contrast with stable micronodular density seen in the lungs without significant change from prior exam. CT abdomen and pelvis with regression of liver lesions with no new evidence of metastasis. Multiple ventral hernias again noted without significant change from prior exam. Creatinine 0.9 (0.6-1.3).    · 2/25/16 - Patient hospitalized at [facility] between 2/25/16 and 2/27/16 with pyelonephritis secondary to E-coli. She was given two days of IV Rocephin and then placed on oral Levaquin. She was asked to hold her Coumadin, but then had nausea and vomiting because of Levaquin and stopped the Levaquin also. Her CA-125 was 32 (0-35) and CEA 1.3 (0-5). Her urine grew >100,000 E-coli. CT of the abdomen and pelvis was done which revealed 4.1 x 1.8 cm sized mild ovoid area of decreased density in the liver parenchyma along the anteromedial  aspect of the dome of the right lobe of the liver, unchanged significantly since the previous study of 2/18/16. There was suspicion of a very small pericardial effusion. There was suspicion of a small hiatal hernia. There were several hernias in the anterior abdominal wall. A 3.5 x 3.1 cm incised well-circumscribed abnormal density in the left retroperitoneal fatty soft tissue at the level of the left iliac crest was suggestive of tumor recurrence. There was an abnormal density in the left retroperitoneal soft tissues in the left flank that partially surrounded the left kidney and extended downward to the left pelvic area. Diverticulosis of the distal descending colon and sigmoid colon were seen.     · 3/8/16 - Patient prescribed Bactrim DS 1 p.o. b.i.d. for 10 days for pyelonephritis, which was partially treated with Rocephin and Levaquin. Urine with 40,000 E-coli treated with Macrobid for 7 days.  of 20 (0-35).    · 3/31/16 - PET scan with area of low density anteriorly in the right lobe of the liver with no significant radiopharmaceutical uptake to suggest malignancy. Multiple round soft tissue density masses showing increased radiopharmaceutical activity and multiple anterior abdominal wall hernias.   · 5/10/16 - Patient complains of venous enlargement of the left chest wall around the port. Has not been seen by  yet, but has an appointment on 5/23/16. Complained of a cough.   · 5/12/16 - Infusaport contrast study with no evidence of fibrin sheath. Chest x-ray with mild cardiac prominence.   · 5/23/16 - Patient seen in consultation by Sheng Bowman M.D. at [facility] and a chemotherapy trial recommended.   · 6/1/16 -   of 27.1 (0-35).    · 6/14/16 - WBC 5.71, hemoglobin 12.4, platelet count 188,000. Patient is scheduled for surgery at  on 6/16/16 after further discussion with  physicians. Discussed adjuvant chemotherapy.   · 6/16/16 - Excisional biopsy of abdominal wall mass  performed by Sheng Bowman M.D. at [facility] with pathology revealing metastatic high-grade papillary serous carcinoma.   · 7/7/16 - Received a call from the patient that Tramadol was not working and that she was given Norco #24 after her biopsy at  which did help her pain. Patient prescribed Norco 5/325 one p.o. q. 4-6 hours p.r.n. #60 with no refills. Echocardiogram with left ventricular inferior wall hypokinesis with ejection fraction of 50-55%.   · 7/8/16 - Patient seen in consultation by Sheng Bowman M.D. in consultation at  for metastatic high-grade papillary serous carcinoma diagnosed by excisional biopsy on 6/16/16 with carcinomatosis and patient not a surgical resection candidate. Genetic sequencing and susceptibility testing of tumor was ordered. Biopsy was done of anterior abdominal wall.   · 7/7/16 - Echocardiogram with left atrial enlargement. Mild mitral regurgitation. Left ventricular proximal inferior wall hypokinesis with ejection fraction of 50-55%.   · 7/11/16 - While waiting for genomics results, in order not to delay treatment further, order written for Taxotere 60 mg/M2 IV over one hour followed by Carboplatin AUC 5 over one hour, cycles to be repeated every three weeks.  Comprehensive metabolic panel normal.  26 (0-35).    · 725/16 - Pain contract signed for use of Norco.   · 8/5/16 - Patient stated that she experienced a red rash and was itchy post taking Norco. Norco discontinued and Tramadol refilled.   · 8/16/16 - Patient started cycle 1 chemotherapy. WBC 7.1, hemoglobin 11.2, MCV 88.7, platelet count 94,000. Patient complained of nausea for a week post chemo. Already getting Emend, Aloxi and Decadron. Added Omeprazole 40 mg daily orally.   · 8/17/16 - Urine culture with >100,000 E-coli.   · 8/25/16 - Cycle 2 chemotherapy given.   · 9/9/16 - Magnesium 1.3, replaced intravenously. Potassium 3.4 (3.6-5.1), increased oral potassium supplementation.    · 9/16/16 -  Cycle 3 chemotherapy given.   · 9/19/16 - Comprehensive metabolic panel with no significant abnormality.   · 9/20/16 - CT abdomen and pelvis with evidence of progressive metastatic disease in the abdomen and pelvis with interval enlargement of several soft tissue attenuations and subcutaneous nodules in the anterior abdominal wall. Interval enlargement of a left pelvic soft tissue attenuation mass. A lentiform area of low attenuation in the dome of the liver stable in size. Multiple anterior abdominal wall hernias containing fat and/or bowel. Marked pelvic floor laxity. CT chest negative for evidence of metastatic disease. Tiny pulmonary nodules in the right lung stable since 2013 consistent with a benign etiology.   · 9/23/16 - Macrobid 100 mg p.o. b.i.d. x7 days prescribed for UTI. Current chemotherapy discontinued after discussion and new chemotherapy orders written. Carboplatin AUC-5 IV day 1 and Doxil (Liposomal Doxorubicin) 30 mg/M2 IV day 1 to cycle q. 21 days.  of 18 (0-35). Magnesium 1.6, replaced intravenously. Comprehensive metabolic panel with potassium 3.4 (3.6-5.1).     · 10/13/16 - Patient started on cycle 1 of Carboplatin and Liposomal Doxorubicin followed by Neulasta.   · 11/3/16 - Cycle 2 Carbo and Doxil given.   · 11/17/16 - Magnesium 1.0, replaced intravenously. Oral potassium supplement increased.   · 11/29/16 - CT chest with small non-calcified right pulmonary nodules unchanged. Benign bone island in the midthoracic vertebral body along with benign bony hemangiomas in the middle thoracic vertebral bodies. CT abdomen and pelvis with mild progressive metastatic disease from CT of September 2016. Anterior abdominal wall mass superiorly mildly increased in size with new area noted as described measuring 3 x 1.7 cm. 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 Stable area of decreased uptake in the dome of the liver not hypermetabolic on recent PET  scan, likely representing cyst or focal fatty infiltration. Stable small hiatal hernia, fat-containing Bochdalek’s hernia and anterior abdominal wall hernias with stable pelvic floor relaxation.    · 12/1/16 - Cycle 3 chemotherapy given.   · 12/8/16 - Current chemotherapy discontinued and patient started on Gemcitabine 1000 mg/M2 IV days 1, 8, 15 q. 28 days.   · 12/22/16 - Patient seen in followup by Juliana Roy M.D. at the pain clinic, treated with Oxycodone and Lyrica. Transaminases normal. Patient started cycle 1 chemotherapy.   · 12/29/16 - Magnesium 1.1 (1.8-2.5), replaced intravenously.   · 1/5/17 - Cycle 1 day 15 chemotherapy held as patient leaving for vacation to [place]. Chemotherapy dose decreased by 20%. Patient complains of diarrhea for which Imodium prescribed.   · 1/11/17 - BRCA-1 and BRCA-2 negative.   · 1/12/17 - Echocardiogram with ejection fraction 60% or greater.   · 1/19/17 - Comprehensive metabolic panel with no significant abnormality. Patient started cycle 2 chemotherapy.   · 2/2/17 - Urine with >100,000 E-coli treated with Cipro 500 mg p.o. b.i.d. x1 week.   · 2/6/17 - Magnesium 1.1 (1.8-2.5), replaced intravenously.    · 2/23/17 - Patient started cycle 3 chemotherapy.   · 2/27/17 - CT chest with interval development of too numerous to count very small pulmonary nodules, ill-defined ground glass opacities concerning for development of pulmonary metastatic disease. Stable left-sided chest port. CT abdomen and pelvis with stable appearance of multiple small soft tissue densities within the abdomen near midline subcutaneous fat of the abdominal wall. Interval decrease in size of largely calcified left pelvic mass and multiple fat in bowel containing small ventral hernias.   · 3/6/17 - Pulmonary consultation obtained for lung nodules. Discussed CT results with patient. Decision made to continue present chemotherapy until further lung evaluation as abdominal disease better.  of 18  (0-35).    · 3/16/17 - Patient started cycle 4 chemotherapy, but treatment held from day 8 onwards because of hospitalization.   · 3/19/17 - Patient was hospitalized at [facility] between 3/19/17 and 3/25/17 with chest pain. Chest x-ray had revealed increased mild bibasilar airspace disease. Troponin I and EKG’s were negative. INR was elevated. Patient was treated with antibiotics. EGD was performed revealing small hiatal hernia and esophageal dilatation was performed. Bronchoscopy was performed also with no intrabronchial lesions identified. IgA was 51 (), IgG 320 (600-1500) and IgM 19 (). Lexiscan nuclear stress test had no evidence of reversible myocardial ischemia with normal left ventricular ejection fraction of 58%. CT chest had development of patchy bilateral ground glass opacities most pronounced involving the left upper lobe and bilateral lower lobes. Multiple tiny bilateral pulmonary nodules were less conspicuous. The patient underwent a bronchoscopy by Jerica Esparza M.D. with right upper lobe bronchoalveolar lavage revealing benign squamous cells and bronchial cells with pulmonary macrophages present. There was mild acute inflammation. Negative for malignant cells. Prilosec was changed to Famotidine for hypomagnesemia.    · 4/6/17 - Magnesium 1.2 (1.8-2.5). Comprehensive metabolic panel with no significant abnormality. Patient seen in follow-up by Fito Ram M.D. at [facility]. Treated with Lyrica and Oxycodone.    · 5/4/17 - Patient complains of a rash itching on her right upper arm. Eczematous rash noted and patient prescribed Cyclocort 0.1% b.i.d. Magnesium infusions continued with each chemo. Order written to resume chemotherapy.   · 5/16/17 - Cycle 5 chemotherapy started.   · 5/26/17 - Magnesium 1.4 (1.8-2.5), replaced intravenously. Potassium 2.9 (3.6-5.1), KCL increased to 20 mEq three in the morning and three in the evenings.   · 5/30/17 - PET scan with remaining metabolic  activity within the nodular areas. The suggested mass which is partially calcified and necrotic within the left aspect of the pelvis seems slightly larger with increased metabolic activity. There was more calcification in these areas compared to prior study, suggesting inflammation.      · 6/8/17 - Patient complaining of shortness of breath. Does take HCTZ at home. Chest x-ray ordered and chemotherapy continued along with weekly magnesium replacement. Chest x-ray with no acute cardiopulmonary abnormalities.   · 6/13/17 - Patient received cycle 6 of chemotherapy.   · 7/31/17 - WBC 5.0, hemoglobin 11.2, MCV 89.7, platelet count 217,000. Patient is to resume chemotherapy starting with cycle 7 on Wednesday of this week.  of 19 (<35). Potassium 3.3 (3.5-5.3).     · 8/2/17 - Patient began cycle 7 of chemotherapy.   · 8/30/17 - Patient started cycle 8 chemotherapy.   · 9/5/17 - Patient seen in followup at Pain Management by Fito Ram M.D. and continued on treatment with Oxycodone and Lyrica.   · 9/13/17 - Patient was hospitalized at [facility] for a day with dizziness secondary to dehydration, hypokalemia and anemia. She was given 1 unit of packed red blood cells and electrolytes were replaced. Chest x-ray revealed a subtle opacity in the left lateral lung base favored to represent atelectasis. Magnesium 1.3 (1.5-2.5), patient continued on replacement.    · 9/22/17 - Chemotherapy and magnesium replacement continued with decrease in chemotherapy dose by 10% secondary to cytopenias.   · 9/25/17 - Cycle 9 chemotherapy started.   · 10/12/17 - CT of the chest with contrast showed several tiny pulmonary nodules. One within the right lower lobe appears new from prior exams. Two adjacent foci of nodularity within the medial right lower lobe suggestive of minor infectious or inflammatory process. CT of the abdomen and pelvis with contrast which showed soft tissue nodules along the anterior abdominal and pelvic walls  unchanged from previous scan. Also a partially calcified mass within the left pelvis unchanged. No acute process identified within the abdomen or pelvis. Several left paracentral ventral hernias unchanged. No evidence of acute bowel obstruction.   · 10/16/17 - Order written for Levaquin 500 mg p.o. daily as the patient states that she has had a productive cough, fever and chills and with the results of the CT scan showing a possible infectious versus inflammatory process. Order also written to continue chemotherapy and magnesium replacement as previously prescribed.   · 10/23/17 - Cycle 10 chemotherapy started.   · 11/19/17 - Patient went to the Emergency Room at [facility] complaining of right lower extremity redness and fever for a day. Venous Doppler had no evidence of a DVT and patient was discharged home on Clindamycin.   · 11/21/17 -  of 17 (0-35).   · 12/4/17 - Cycle 11 chemotherapy started.   · 12/20/17 - PET scan with multiple hypermetabolic soft tissue nodules abutting the anterior abdominal wall, stable from previous examination. Peripherally calcified left lower quadrant mass near the ascending colon stable in size demonstrating no hypermetabolic uptake. Previously had maximum SUV of 7. Right medial basilar pulmonary nodules resolved.   · 12/22/17 - CT left lower extremity with soft tissue swelling with skin thickening present along the anterior and medial aspect of the leg, slightly more pronounced around the palpable marker seen within the upper medial aspect of the lower extremity.   · 12/26/17 - Elastic stockings prescribed for lower extremity edema.   · 1/8/18 - Patient hospitalized at [facility] between 1/8/18 and 1/11/18 with fever of up to 101 degrees and painful rash on the lower extremities of several weeks’ duration. She was found to be hypokalemic and MRI of the lower extremities revealed thickening and swelling. Chest x-ray had no acute cardiopulmonary abnormality. Skin biopsy was performed by  Surgery revealing stasis dermatitis and no evidence of malignancy. Infectious Disease consultation was obtained and IV antibiotics were stopped. Blood cultures had no growth.   · 1/22/18 - Patient asked to start wearing elastic stockings which she has not started yet. She was given a prescription for Dyazide 1 p.o. daily.   · 2/26/18 - Ordered chemo to resume again. Patient unaware that she was supposed to resume chemo after her last visit in January. Continue magnesium infusions on day 1, 8 and 15. Prescribed Levaquin 500 mg p.o. daily x10 days for productive cough x1 week. History of viral illness consistent with influenza.  of 18 (0-35). Chest x-ray with no acute process.    · 3/5/18 - Cycle 12 chemotherapy started.   · 3/26/18 - Options discussed with patient. Decision made to discontinue IV Gemzar and orders written to start on Niraparib (Zejula) 300 mg p.o. daily as maintenance therapy.   · 4/11/18 - Niraparib discontinued due to insurance denial. Orders written to start Carboplatin-AUC 5 IV day 1 cycling every 21 days. Orders written for Neulasta 6 mg subcutaneous kit day 1 with palliative treatment intent and expected duration of treatment three months.   · 4/12/18 - CT chest, abdomen and pelvis with contrast revealed numerous tiny centrilobular nodules in the lungs favored to reflect infectious or inflammatory process. Nodules ranged 1-3 mm in size and are new from prior studies with metastatic disease not entirely excluded. Stable small hiatal hernia. Interval progression of metastatic disease involving the subcutaneous tissues at the ventral abdominal wall. Multiple soft tissue density nodules previously shown to be hypermetabolic on PET scan. New small crescent of low attenuation material along the periphery of the spleen with similar finding at the dome of the liver. Findings are concerning for peritoneal metastases. Density of the dome of the liver remained unchanged from multiple prior studies.  Stable calcified mass in the left pelvic sidewall. Urinary bladder wall thickening.   · 4/23/18 - Cycle 1 chemotherapy with Carboplatin administered along with Neulasta injection.   · 4/26/18 - Neulasta discontinued due to insurance denial.   · 5/14/18 - Patient received cycle 2 Carboplatin.   · 6/5/18 - Cycle 3 day 1 chemotherapy with Carboplatin administered.   · 6/20/18 - CT chest, abdomen and pelvis at [facility] showed re-demonstration of soft tissue mass in the ventral wall hernia left of the midline as well as the dome of the liver, likely representing metastatic disease similar as compared to the prior study. Additional calcified lesions present in the upper left hemipelvis and along the anterior abdominal wall to the right of the midline also likely representing metastatic disease. No definite new lesions were identified. Moderate to large stool burden likely related to mild constipation was present. No suspicious lung nodules were identified. The previously-described ground glass nodules appeared to have resolved. There was a stable subpleural nodule in the right upper lobe measuring 3 mm present since 2014 without significant changes.   · 6/26/18 - Cycle 4 day 1 Carboplatin administered with addition of Neulasta for febrile neutropenia prophylaxis.   · 6/29/18 - Reviewed CT scans with patient. Orders written to discontinue Carboplatin and Neulasta and plan to start Niraparib 300 mg by mouth daily as maintenance therapy. Prescribed Amlodipine 2.5 mg p.o. daily for chemo-induced hypertension.   · 7/18/18 - Orders written to increase weekly Magnesium Sulfate to 3 g IV weekly due to continued hypomagnesemia.   · 8/1/18 - Patient reports she has not received Niraparib (Zejula) to date.   · 8/30/18 - Patient’s phone was not working so though Niraparib approved, the drug company had not been able to get ahold of her. Patient supplied with drug company number so that she can start taking the pills.    · 9/6/18 -  of 20 (N).   · 9/18/18 - Urinalysis done for dysuria and back pain. Urine culture grew 20,000 to 50,000 colonies of urogenital ruy. Prescribed Bactrim DS one tablet twice daily for one week.   · September 2018 - Patient initiated on Zejula 300 mg p.o. daily.   · 10/1/18 - WBC 3.5, hemoglobin 12, platelet count 74,000. Niraparib (Zejula) dose held and then resumed when platelets above 100,000 at a dose of 200 mg daily.   · 10/15/18 - Patient received Niraparib (Zejula) at 200 mg p.o. daily with a platelet count of 198,000.   · 11/7/18 - WBC 6, hemoglobin 11.6, MCV 96.2, platelet count 137,000. Patient claims to have stopped taking Niraparib a few days prior because of cough. Asked to resume taking it. Ciprofloxacin 500 mg p.o. twice daily for one week for bronchitis.   · 12/3/18 - Magnesium Sulfate infusions changed to every two weeks, to send mag level before and give 3 grams. DC’d Magnesium Oxide and started Magnesium Plus Protein two pills twice daily.   · 1/4/19 - CT chest, abdomen and pelvis with new patchy nodular areas of airspace consolidation within the bilateral upper lobes, left greater than right, favored to be infectious or inflammatory. Interval enlargement of the peritoneal soft tissue masses within the pelvis compatible with progression of disease. Enlarging ventral hernia now containing multiple loops of small bowel and colon.   · 1/7/19 - Patient has not been coming in for weekly magnesium replacement as nursing has not been able to get ahold of her. Results of CT’s discussed with patient. Decision made to change her to IV chemotherapy with Carboplatin AUC-5 day 1 and pegylated liposomal Doxorubicin (Doxil) 30 mg/M2 IV day 1 to cycle every four weeks. Patient made aware of the limited choices of her treatment available.   · 1/31/19 - Echocardiogram showed LVEF 50-55% and otherwise normal echo Doppler study.   · 2/4/19 - New chemotherapy not initiated to date with difficulty  contacting patient for echocardiogram. Neulasta On-Pro 6 mg ordered with chemotherapy due to extensive prior exposure to chemotherapy, increasing risk of febrile neutropenia, history of neutropenic events on prior chemotherapy regimens.   · 2/15/19 - Patient started cycle 1 chemotherapy with Neulasta support.   · 3/6/19 -  of 28 (0-35).   · 3/15/19 - Cycle 2 Carboplatin with Liposomal Doxorubicin (Doxil) and Neulasta support initiated.     · 4/10/19 - Patient was requested to followup with Dr. Queen regarding hypotension and blood pressure medication dosing. Patient appears to be tolerating treatment well with cytopenias not requiring dose adjustments. No Doxil-related skin or mucus membrane toxicities or other significant toxicities to date.   · 4/12/19 - Cycle 3 chemotherapy given.   · 4/16/19 - CT chest, abdomen and pelvis with no evidence of active metastasis to the chest. Several tree-in-bud nodules within the periphery of the inferior right upper lobe likely infectious or inflammatory. Disease burden within abdomen and pelvis stable to slightly increased from prior CT. Specifically, a soft tissue mass along the right rectus muscle slightly increased in bulk. Multiple ventral hernias, some containing loops of bowel, with no evidence of acute bowel obstruction.   · 5/8/19 - Discussed CT results with patient. Overall stable disease and would like to continue same treatment. Dove soap and Hydrocortisone Cream p.r.n. prescribed for scattered rash on back.   · 5/10/19 - Cycle 4 Carboplatin and Liposomal Doxorubicin initiated.   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 It was ER positive, HER2/zuleima negative, PD-L1 negative. There was TOPO1 positivity and TUBB3 positivity. TMB was low (two mutations per megabyte MUTS/MB) and MSI was stable. There were 13 therapies considered with increased benefit. The 13 therapies with increased benefit  included Fulvestrant, Irinotecan, Letrozole, Topotecan, Anastrazole, Exemestane, Tamoxifen, all of which were referenced to [facility] as well as Abemaciclib, Everolimus, Gefitinib, Palbociclib, Ribociclib and Toremifene.     · 6/5/19 echocardiogram with preserved LV systolic function with EF around 60%.  · 6/7/19 cycle 5 chemotherapy with Neulasta support given.  Patient continued on mag oxide 400 mg p.o. twice daily and weekly IV 3 g mag sulfate infusions for persistent hypomagnesemia.  · 7/5/2019- cycle 6 chemotherapy with carboplatin and doxorubicin with Neulasta port initiated.  Ca125:  21 (0-35).  · 7/23/2019-CT chest abdomen and pelvis compared to CT from 4/16/2019 revealed multiple small pulmonary nodules unchanged from prior examination within the right upper and left lower lobes.  Complex ventral hernia similar to prior exam.  Soft tissue nodule along the right lateral margin of the right of the midline measuring 12 x 10 mm unchanged in size.  Irregular soft tissue nodular thickening along the margin of the most inferior aspect of the complex ventral hernia with thickness measuring up to 13 mm similar to prior examination.  Extensive calcified and soft tissue mass within the left lower quadrant decrease in size now measuring 2.6 x 2.3 cm previously 3.0 x 2.5 cm.  Partially calcified mass involving the right rectus sheath measuring 3.1 x 2.7 cm previously measured 3.3 x 2.9 cm.  Densely calcified mass measuring 2.1 x 1.6 cm unchanged previously measured 2 x 1.7 cm.  Right external iliac chain lymph node measuring 9 mm previously measured 5 mm.  · 8/2/2019 cycle 7 chemotherapy given.  · 8/30/2019-cycle 8 chemotherapy with carboplatin and doxorubicin with Neulasta support given.  · 9/27/2019 cycle 9 chemotherapy given.  · 10/1/19 - Echocardiogram showed Normal LV size and contractility EF of 60-65%. Normal RV size. Borderline left atrial enlargement. Aortic valve, mitral valve, tricuspid valve appears structurally  normal, no significant regurgitation seen.No pericardial effusion seen. Proximal aorta appears normal in size.  · 10/18/19 - Magnesium 1.5 (1.8-2.5).  IV magnesium replacement continued.  · 10/25/2019 cycle 10 chemotherapy given.  · 10/29/2019 patient had CT scan of the chest abdomen and pelvis-there is a new 2 mm nodule in the left lower lobe with a stable 2 mm nodule in the left lower lobe.  Follow-up in 6 months was recommended.  Stable multiple soft tissue density and calcified nodules within the ventral abdominal wall and left retroperitoneal fat consistent with nonviable metastatic disease.  These nodules have either decreased in size or stable.  · 11/22/2019 10 received cycle 11 of chemotherapy with Doxil and carboplatinum with Neulasta  · 12/8/2019 to 12/11/2019 patient was admitted to the hospital for neutropenic fever.  · 12/20/2019 patient received cycle 12 of chemotherapy with Doxil and carboplatinum with Neulasta  · 1/13/20: 2 D echo was normal with EF 56% to 60%  · 1/24/20-Patient received cycle 13 of combination chemotherapy with carboplatinum and Doxil  · 2/3/2020 patient had a  which was 25.4  · 2/21/2020-patient received cycle 14 of chemotherapy with carboplatinum and Doxil  · 3/6/2020 patient had CT scan of the chest, abdomen and pelvis this revealed a 3 mm nodule in the left lower lobe present on prior CT of the abdomen unchanged from July 2019.  Stable 3 mm right upper lobe nodule.  There is a lymph node in the AP window measuring 8 x 9 mm prominent left hilar lymph node measuring 12 mm previously was 7 x 7 mm no significant adenopathy was seen in the abdomen there is an area of irregular soft tissue in the left paramidline ventral hernia which is stable measuring 5.7 cm partially calcified mass in the right rectus measuring 3 x 2.5 cm which is also stable the prominent lymph nodes in the left mid hilum and mediastinum may be reactive or metastatic disease  · 4/17/2020-patient had cycle 15  of single agent carboplatinum  · 5/26/2020 patient had CT scan of the chest, abdomen and pelvis showed 3 new lung nodules measuring 7 mm in the left upper lobe, 5 mm in the left lower lobe and 4 mm in the right lower lobe.  There were additional small lung nodules which were unchanged.  Mildly prominent mediastinal and bilateral hilar lymph nodes mildly increased in size.  Right hilar node 9 mm previously was 5 mm.  Carinal node 9 mm previously was 6 mm.  The nodule at the right side of the hernia sac has increased to 1.5 cm from 1.2 cm.  Also a nodule in the subcutaneous fat currently measures 2.4 was previously 2 cm.  · 5/15/2020-patient received cycle 16 of carboplatinum  · Since the last visit in the office patient patient developed dizzy spell and fell. For that reason she was taken to the emergency room at [facility].    · 7/3/2020 she had brain MRI which showed an 8 mm focus of abnormality in the left aspect of the posterior fossa corresponding to a dural based enhancing lesion thought to represent a calcified meningioma vessels calcified dural based metastasis.  This lesion has a slight local mass-effect and a small amount of surrounding edema.  There is also a 4 to 5 mm rounded focus of abnormal nodular enhancement along the cortex of the left parietal lobe but no significant mass-effect.  This is nonspecific could represent neoplastic process, infectious/inflammatory process or granulomatous disease.  · Patient was seen by neurosurgery, follow-up brain MRI in 8 weeks has been recommended by Dr. Hartman  · 7/9/2020 patient was initiated on single agent topotecan cycle 1 day 1  · 7/16/2020 she received the 8 topotecan  · 8/6/2020 patient received cycle 1 day 15 of topotecan  · 9/2/2020 patient had brain MRI multiple hospital which showed interval increase in size of the metastasis in the left parietal lobe now measuring 9 x 7 mm.  Previously was 5 mm.  There has been interval increase in size of the left superior  cerebellar metastases now measuring 1.3 cm previously was 8 mm.  There was no new metastatic disease identified.  · 9/11/2020 patient received cycle 2-day 15 of single agent topotecan  · Was admitted to the hospital 10/1/2020 for supratherapeutic INR  · 10/13/2020 patient was seen by Dr. Delong she has started stereotactic brain radiation to be completed on 10/28/2020       2. Deep vein thrombosis of left upper extremity diagnosis established in October of 2013.  · 10/16/13 - Venous Doppler of left upper extremity.  Impression:  Deep vein thrombosis involving the subclavian, axillary and brachial veins on the left side, superficial vein thrombosis involving the left basilic vein.  · 10/16/13 - Order written for Lovenox 120 mg subcutaneously daily until INR is in therapeutic range.  Order written for Coumadin 5 mg p.o. daily.  The patient is to follow PT and INR protocol.  · 5/20/16 - Venous Doppler bilateral lower extremities normal.  · 7/30/19 - Patient continued on Coumadin following the INR protocol.      3. Anemia diagnosis established in November 2013 and pernicious anemia diagnosis established March 2016.   · 11/15/13 - Hemoglobin is 7.8.  · 12/2/13 - Orders written to start Aranesp 200 mg subcutaneous every two weeks due to low hemoglobin secondary to chemo induced anemia.  Hemoglobin is 9.7.  Anemia workup is ordered.  · 12/03/13 - Ferritin 179.8 (N), folic acid 8.3 (N), vitamin B12 314, iron 104 (N), TIBC 298 (N), iron saturation 35 (N), Reticulocyte count 0.88 (N).  EPO level 124 (N).  Haptoglobin 22 (H).  · 10/22/14 - Hemoglobin 12.5, hematocrit 37.3, MCV 91.6.  · 10/23/14 - Anemia resolved.   · 3/8/16 - WBC 5.8, hemoglobin 11.7, MCV 90.3, platelet count 245,000. Vitamin B12 of 189 (180-914), ferritin 61 (), iron 91 (), TIBC 345 (228-428).   · 3/15/16 -  ().        · 3/22/16 - Intrinsic factor antibody positive, antiparietal antibody negative. Vitamin B12 at 1000 mcg IM weekly  started, but the patient after receiving first dose on 3/22/16 did not come back for injections until 4/13/16.   · 4/13/16 - WBC 5.1, hemoglobin 12.5, MCV 87.9, platelet count 201,000. Patient given B12 injection and then continued at home.   · 5/10/16 - WBC 5.1, hemoglobin 12.5, platelet count 201,000.   · 6/14/16 - Monthly Vitamin B12 injections continued at home.   · 7/11/16 - WBC 5.48, hemoglobin 12.7, MCV 86.2, platelet count 200,000.   · 8/16/16 - Patient continued on monthly Vitamin B12 injections at home.   · 1/5/17 - WBC 2.6 with 38% neutrophils, 56% lymphocytes, 5% monocytes, hemoglobin 10.5, .3, platelet count 63,000. Patient continued on Vitamin B12 injections 1000 mcg IM monthly at home.   · 3/20/17 - Retic 0.41 (0.5-1.5), creatinine 1.0 (0.4-1.0). Iron 12 (), TIBC 270 (228-428), ferritin 259 (), haptoglobin 340 (), TSH 0.43 (0.34-5.6), folate 6.0 (5.9-24.8). Serum protein electrophoresis revealed decreased gammaglobulins. Serum EDU had no monoclonal gammopathy identified. Stool Hemoccult was negative.   · 6/8/17 - WBC 6.3, hemoglobin 8.3, MCV 92.4, platelet count 50,000. Procrit 40,000 units subq weekly added for chemotherapy-induced anemia. Patient continued on Vitamin B12 at 1000 mcg IM monthly at home.   · 7/5/17 - Iron 33 (), TIBC 328 (228-428), ferritin 211 (), TSH 2.46 (0.34-5.6).       · 7/31/17 - WBC 5.0, hemoglobin 11.2, MCV 89.7, platelet count 217,000. We will continue with the Procrit 40,000 units subq weekly for chemo-induced anemia when the hemoglobin falls below 10.0 and the patient is also to continue with the Vitamin B12 at 1000 mcg IM monthly at home. Creatinine 1.24 (0.5-0.99).    · 8/28/17 - WBC 9.6, hemoglobin 8.7, MCV 93.7, platelet count 133,000. Patient is to continue with the Procrit 40,000 units subq weekly and will receive that today. She is also to continue with the Vitamin B12 at 1000 mcg IM monthly at home.  · 10/16/17 - WBC 7.5,  hemoglobin 9.6, MCV 98.4, platelet count 195,000. Patient is to continue with Procrit 40,000 units subq weekly and will receive Procrit today.   · 1/1/18 - Creatinine 1.3 (0.4-1.0).    · 2/19/18 - Procrit given for hemoglobin 9.9.   · 2/26/18 - Continue Procrit 40,000 units weekly p.r.n. hemoglobin <10. Continue Vitamin B12 at 1000 mcg IM monthly at home.   · 3/26/18 - Hemoglobin 8.3. Procrit dose increased to 60,000 units weekly. Iron 29 (), TIBC 306 (228-428), and iron sat 9% (15-50). Iron 29 (), TIBC 306 (228-428), iron saturation 9% (15-50).   · 3/27/18 - Order written to start Ferrous sulfate 325 mg p.o. b.i.d.    · 4/2/18 - Stool heme negative x3.   · 4/30/18 - Patient had not started Ferrous sulfate 325 mg p.o. b.i.d. and verbalized understanding to do so and to notify the office if side effects are not tolerable.   · 5/24/18 - Patient was hospitalized at [facility] between 5/24/18 and 5/26/18 with dark tarry stool. INR was subtherapeutic. She was given IV Protonix and Protonix 40 mg daily. She underwent an EGD by David Rogers M.D. revealing small hiatal hernia, small submucosal nodule near the cardia, erosive gastritis. Pathology on gastric antrum and body biopsy revealed iron pill gastritis and no evidence of Helicobacter pylori. She then underwent a colonoscopy revealing diverticulosis, hemorrhoids and surgical changes from previous resection. It was recommended to consider outpatient M2A if hemoglobin continued to drop.   · 6/4/18 - Order written to discontinue iron pills and to use Injectafer 750 mg IV days 1 and 8 for low iron sats. Order written for Procrit 40,000 units subq weekly. Iron 65 (), TIBC 328 (228-428), iron saturation 20% (15-50), ferritin 123 ().   · 6/29/18 - Discussed patient’s intolerance of oral iron due to gastritis. Oral iron discontinued with plans for Injectafer should iron level drop in the future.   · 11/7/18 - Patient claims not to be taking Vitamin  B12 injections at home. Asked to resume those.   · 12/3/18 - Patient reports she has not been taking her B12 injections for a couple of months. She needs some syringes and needles for that.   · 2/4/19 - Patient is uncertain if she is currently taking Ferrous Sulfate or not. Patient with history of intolerance and will follow hemoglobin.   · 5/8/19 - Ferritin 64 ().  · 8/27/2019- iron 52 (), iron saturation 14% (15-50), TIBC 364 (228-420), and ferritin 74 ().  Injectafer 750 mg IV day 1 and 8 due to oral iron intolerance ordered.  · 8/30/2019- Injectafer 750 mg IV day 1 given.  Hemoglobin 10.8.  At the end of the infusion heart rate was 48 and the patient reported mild dizziness.  Patient was sent to the ED for evaluation with symptoms resolving rapidly.  Chest x-ray and CT head were negative for acute findings.  · 9/6/2019-Injectafer 750 mg IV day 8 given without adverse effects.  Hemoglobin 9.8.   · 9/25/19 - Iron 85 (). Iron saturation 25 (15-50). TIBC 335 (228-428). Ferritin  694 ().  Hemoglobin 10.3.  · 10/25/2019 hemoglobin 9.7.  Patient started on Retacrit 40,000 units subcu weekly.    Past Medical History:   Diagnosis Date   • Anemia 2013   • Cervical disc disorder 2013    Herniated disc, pinched nerve   • Clotting disorder (CMS/HCC) 2013    Low platelets from chemo   • Colon polyp 2013   • Deep vein thrombosis (CMS/HCC) 2013   • Diverticulosis 2013   • GERD (gastroesophageal reflux disease) 2016   • HL (hearing loss) 2016    I need hearing aids   • Hyperlipidemia 2013    Need my cholesterol rechecked   • Hypertension    • Joint pain 2013    Shoulder pain, torn rotator cuff   • Low back pain 2013   • Neuromuscular disorder (CMS/HCC) 2015    Shingles, pinched nerves in my neck   • Osteopenia 2014   • Osteoporosis    • Ovarian cancer (CMS/HCC)     ovarian   • Ovarian cancer on left (CMS/HCC) 1995   • Pneumonia 2010    Have had it several times   • Spinal stenosis 2013    Cervical    • Urinary tract infection 2013    Have had several utis       Past Surgical History:   Procedure Laterality Date   • COLON SURGERY     • COLONOSCOPY  05/26/2018   • EXPLORATORY LAPAROTOMY     • HERNIA REPAIR     • HYSTERECTOMY     • OOPHORECTOMY           Current Outpatient Medications:   •  acetaminophen (TYLENOL) 500 MG tablet, Take 1,000 mg by mouth Every 6 (Six) Hours As Needed for Mild Pain ., Disp: , Rfl:   •  atorvastatin (LIPITOR) 20 MG tablet, Take 20 mg by mouth every night at bedtime., Disp: , Rfl: 3  •  cyanocobalamin 1000 MCG/ML injection, Inject 1,000 mcg into the appropriate muscle as directed by prescriber Every 28 (Twenty-Eight) Days., Disp: , Rfl:   •  famotidine (PEPCID) 40 MG tablet, TAKE 1 TABLET BY MOUTH EVERY MORNING BEFORE BREAKFAST, Disp: 90 tablet, Rfl: 1  •  loperamide (Imodium A-D) 2 MG tablet, Take 1 tablet by mouth 3 (Three) Times a Day As Needed for Diarrhea., Disp: 60 tablet, Rfl: 2  •  magnesium oxide (MAG-OX) 400 MG tablet, TAKE 1 TABLET BY MOUTH TWICE A DAY, Disp: 180 tablet, Rfl: 1  •  ondansetron ODT (ZOFRAN-ODT) 8 MG disintegrating tablet, Place 1 tablet under the tongue Every 8 (Eight) Hours As Needed for Nausea or Vomiting., Disp: 30 tablet, Rfl: 3  •  oxyCODONE (ROXICODONE) 10 MG tablet, Take 1 tablet by mouth 3 (Three) Times a Day As Needed for Moderate Pain ., Disp: 90 tablet, Rfl: 0  •  potassium chloride (K-DUR,KLOR-CON) 20 MEQ CR tablet, Take 40 mEq by mouth Every Evening., Disp: , Rfl:   •  pregabalin (LYRICA) 100 MG capsule, Take 1 capsule by mouth 3 (Three) Times a Day., Disp: 90 capsule, Rfl: 2  •  sertraline (ZOLOFT) 50 MG tablet, Take 50 mg by mouth Every Evening., Disp: , Rfl:   •  temazepam (RESTORIL) 30 MG capsule, TAKE 1 CAPSULE BY MOUTH AT NIGHT AS NEEDED FOR SLEEP., Disp: 30 capsule, Rfl: 1  •  triamterene-hydrochlorothiazide (DYAZIDE) 37.5-25 MG per capsule, TAKE 1 CAPSULE BY MOUTH EVERY DAY, Disp: 90 capsule, Rfl: 1  •  warfarin (COUMADIN) 5 MG tablet,  Take 5.5 mg by mouth Daily., Disp: , Rfl:   •  traZODone (DESYREL) 50 MG tablet, Take 1 tablet by mouth Every Night., Disp: 30 tablet, Rfl: 1    Allergies   Allergen Reactions   • Morphine Nausea Only and Hives   • Penicillin V Potassium Rash   • Sulfa Antibiotics Rash   • Levaquin [Levofloxacin] Nausea Only and Dizziness     When taken with Coumadin   • Amoxicillin Rash   • Norco [Hydrocodone-Acetaminophen] Rash       Family History   Problem Relation Age of Onset   • Hypertension Mother    • Cancer Father    • Hypertension Father    • Hypertension Sister    • Hypertension Brother    • Stroke Brother        Cancer-related family history includes Cancer in her father.    Social History     Tobacco Use   • Smoking status: Never Smoker   • Smokeless tobacco: Never Used   Substance Use Topics   • Alcohol use: No     Frequency: Never     Comment: caffeine 32-64oz qd   • Drug use: No       I have reviewed and confirmed the accuracy of the patient's history: Chief complaint, HPI and ROS as entered by the MA/LPN/RN. Cindy Ball MD 10/26/20     SUBJECTIVE:    The patient is here for a follow up appointment.  Patient remains largely asymptomatic from the metastatic brain disease.  She is currently in the process of stereotactic brain radiation which will be completed later on this week.        REVIEW OF SYSTEMS:  Review of Systems   Constitutional: Negative for chills and fever.   HENT: Negative for ear pain, mouth sores, nosebleeds and sore throat.    Eyes: Negative for photophobia and visual disturbance.   Respiratory: Negative for wheezing and stridor.    Cardiovascular: Negative for chest pain and palpitations.   Gastrointestinal: Negative for abdominal pain, diarrhea, nausea and vomiting.   Endocrine: Negative for cold intolerance and heat intolerance.   Genitourinary: Negative for dysuria and hematuria.   Musculoskeletal: Negative for joint swelling and neck stiffness.   Skin: Negative for color change.  "  Neurological: Negative for seizures and syncope.   Hematological: Negative for adenopathy.   Psychiatric/Behavioral: Negative for agitation, confusion and hallucinations.     I have reviewed and confirmed the accuracy of the ROS as documented by the MA/LPN/RN Cindy Ball MD      OBJECTIVE:    Vitals:    10/26/20 1142   BP: 137/79   Pulse: 77   Resp: 20   Temp: 97.3 °F (36.3 °C)   Weight: 83.2 kg (183 lb 6.4 oz)   Height: 165.1 cm (65\")   PainSc: 0-No pain       ECOG  (1) Restricted in physically strenuous activity, ambulatory and able to do work of light nature    Physical Exam   Constitutional: She is oriented to person, place, and time. No distress.   HENT:   Head: Normocephalic and atraumatic.   Eyes: Conjunctivae are normal. Right eye exhibits no discharge. Left eye exhibits no discharge. No scleral icterus.   Neck: Normal range of motion. Neck supple. No thyromegaly present.   Cardiovascular: Normal rate, regular rhythm and normal heart sounds. Exam reveals no gallop and no friction rub.   Pulmonary/Chest: Effort normal. No stridor. No respiratory distress. She has no wheezes.   Abdominal: Soft. Bowel sounds are normal. She exhibits no mass. There is no abdominal tenderness. There is no rebound and no guarding.   Musculoskeletal: Normal range of motion. No tenderness.   Lymphadenopathy:     She has no cervical adenopathy.   Neurological: She is alert and oriented to person, place, and time. She exhibits normal muscle tone.   Skin: Skin is warm. She is not diaphoretic. No erythema.   Psychiatric: Her behavior is normal. Judgment and thought content normal.   Nursing note and vitals reviewed.    I have reexamined the patient and the results are consistent with the previously documented exam. Cindy Ball MD     RECENT LABS  WBC   Date Value Ref Range Status   10/26/2020 5.05 3.40 - 10.80 10*3/mm3 Final   07/05/2020 4.04 (L) 4.5 - 11.0 10*3/uL Final     RBC   Date Value Ref Range Status "   10/26/2020 4.05 3.77 - 5.28 10*6/mm3 Final   07/05/2020 3.68 (L) 4.0 - 5.2 10*6/uL Final     Hemoglobin   Date Value Ref Range Status   10/26/2020 12.1 12.0 - 15.9 g/dL Final   07/05/2020 11.5 (L) 12.0 - 16.0 g/dL Final     Hematocrit   Date Value Ref Range Status   10/26/2020 38.3 34.0 - 46.6 % Final   07/05/2020 35.3 (L) 36.0 - 46.0 % Final     MCV   Date Value Ref Range Status   10/26/2020 94.6 79.0 - 97.0 fL Final   07/05/2020 95.9 80.0 - 100.0 fL Final     MCH   Date Value Ref Range Status   10/26/2020 29.9 26.6 - 33.0 pg Final   07/05/2020 31.3 26.0 - 34.0 pg Final     MCHC   Date Value Ref Range Status   10/26/2020 31.6 31.5 - 35.7 g/dL Final   07/05/2020 32.6 31.0 - 37.0 g/dL Final     RDW   Date Value Ref Range Status   10/26/2020 16.3 (H) 12.3 - 15.4 % Final   07/05/2020 15.9 12.0 - 16.8 % Final     RDW-SD   Date Value Ref Range Status   10/26/2020 54.9 (H) 37.0 - 54.0 fl Final     MPV   Date Value Ref Range Status   10/26/2020 11.5 6.0 - 12.0 fL Final   07/05/2020 10.2 6.7 - 10.8 fL Final     Platelets   Date Value Ref Range Status   10/26/2020 154 140 - 450 10*3/mm3 Final   07/05/2020 158 140 - 440 10*3/uL Final     Neutrophil Rel %   Date Value Ref Range Status   07/05/2020 54.0 45 - 80 % Final     Neutrophil %   Date Value Ref Range Status   10/26/2020 60.4 42.7 - 76.0 % Final     Lymphocyte Rel %   Date Value Ref Range Status   07/05/2020 30.4 15 - 50 % Final     Lymphocyte %   Date Value Ref Range Status   10/26/2020 22.2 19.6 - 45.3 % Final     Monocyte Rel %   Date Value Ref Range Status   07/05/2020 12.4 0 - 15 % Final     Monocyte %   Date Value Ref Range Status   10/26/2020 11.9 5.0 - 12.0 % Final     Eosinophil %   Date Value Ref Range Status   10/26/2020 5.1 0.3 - 6.2 % Final   07/05/2020 3.0 0 - 7 % Final     Basophil Rel %   Date Value Ref Range Status   07/05/2020 0.2 0 - 2 % Final     Basophil %   Date Value Ref Range Status   10/26/2020 0.4 0.0 - 1.5 % Final     Immature Grans %   Date  Value Ref Range Status   07/05/2020 0.0 0 % Final     Neutrophils Absolute   Date Value Ref Range Status   07/05/2020 2.18 2.0 - 8.8 10*3/uL Final     Neutrophils, Absolute   Date Value Ref Range Status   10/26/2020 3.05 1.70 - 7.00 10*3/mm3 Final     Lymphocytes Absolute   Date Value Ref Range Status   07/05/2020 1.23 0.7 - 5.5 10*3/uL Final     Lymphocytes, Absolute   Date Value Ref Range Status   10/26/2020 1.12 0.70 - 3.10 10*3/mm3 Final     Monocytes Absolute   Date Value Ref Range Status   07/05/2020 0.50 0.0 - 1.7 10*3/uL Final     Monocytes, Absolute   Date Value Ref Range Status   10/26/2020 0.60 0.10 - 0.90 10*3/mm3 Final     Eosinophils Absolute   Date Value Ref Range Status   07/05/2020 0.12 0.0 - 0.8 10*3/uL Final     Eosinophils, Absolute   Date Value Ref Range Status   10/26/2020 0.26 0.00 - 0.40 10*3/mm3 Final     Basophils Absolute   Date Value Ref Range Status   07/05/2020 0.01 0.0 - 0.2 10*3/uL Final     Basophils, Absolute   Date Value Ref Range Status   10/26/2020 0.02 0.00 - 0.20 10*3/mm3 Final     Immature Grans, Absolute   Date Value Ref Range Status   07/05/2020 0.00 <1 10*3/uL Final     nRBC   Date Value Ref Range Status   10/02/2020 0.1 0.0 - 0.2 /100 WBC Final       Lab Results   Component Value Date    GLUCOSE 134 (H) 10/02/2020    BUN  10/02/2020      Comment:      Testing performed by alternate method    BUN 26 (H) 10/02/2020    CREATININE 1.00 10/02/2020    EGFRIFNONA 56 (L) 10/02/2020    EGFRIFAFRI >60 06/07/2019    BCR  10/02/2020      Comment:      Testing not performed    K 4.1 10/02/2020    CO2 25.0 10/02/2020    CALCIUM 9.1 10/02/2020    ALBUMIN 3.90 09/30/2020    LABIL2 1.3 07/03/2020    AST 20 09/30/2020    ALT 11 09/30/2020           ASSESSMENT:    1. Recurrent ovarian cancer metastases to the brain was on carboplatinum, Doxil.  Patient had had carboplatinum Taxol, carbo Taxotere, Gemzar single agent, Niraparib and was on Doxil and carboplatinum.  Doxil was discontinued due  to approaching of lifetime dosing.  Refer to paradigm testing for future options at time of progression.  Patient has had progression of disease and therefore platinum was discontinued.  Topotecan single agent has been recommended.   2. Currently on single agent topotecan held for stereotactic brain radiation  3. Progressive brain metastasis: Currently receiving stereotactic brain radiation under the care of Dr. Delong  4. Iron deficiency anemia: Intermittently on  Iron  5. History of DVT on anticoagulation therapy with warfarin  6. Pancytopenia: Due to chemotherapy: On Procrit  7. Hypomagnesemia: Continue to monitor levels and infuse as needed  8. B12 deficiency on parenteral B12 injections  9. Rash on face likely drug induced:resolved  10. Monitoring for chemotherapy toxicities  11. Supratherapeutic INR: Adjusted today 10/26/2020  12. Assessment have been reviewed and updated      DISCUSSION:    I have reviewed her restaging CT scans.  Patient has new lung nodules and also enlarging nodules in the abdomen and pelvis consistent with progressing disease.  For that reason we will discontinue single agent carboplatinum and transition patient to topotecan.  Also note patient was on carboplatinum every 4 weeks with Doxil.  Doxil was discontinued due to approaching lifetime maximum dosing.  In the future carboplatinum may be reused but at 3 weekly intervals.    Discussed side effects of chemotherapy to include but not limited to  Chemotherapy side effects include, but not limited to, nausea, vomiting, bone marrow suppression, which can result in blood, platelet transfusion. There is also risk of permanent bone marrow destruction, which can cause myelodysplastic side effects of her new chemotherapy or leukemia years down the line. There is risk of infection which can result in hospitalization and even death. There is also risk of fatigue, asthenia, alopecia which could become permanent. Chemo will help to reduce risk of  relapse of cancer, but does not eliminate risk completely.    Specifically topotecan can lead to severe bone marrow suppression which may result in platelet transfusions    She has had interval increase in the size of the 2 brain  lesions, will refer to Dr. Delong      PLANS:       Patient to complete stereotactic radiation on 10/28/2020  Restart topotecan on November 5, 2020  Chemotherapy with topotecan but with 20% dose reduction due to cytopenias: Hold until radiation is completed   monthly: Check levels today.  Ongoing  Continue magnesium oxide 400 mg three times a day and weekly IV replacement as needed.   Continue Coumadin: Warfarin dose adjusted.  INR next week  Continue b12 injections IM monthly. Transitioned injections to be done at the cancer center  Continue Retacrit 40,000 units subcu weekly for hemoglobin less than 10, For chemotherapy-induced anemia  Magic mouthwash PRN for mucositis  All questions answered to the best of my abilities  Follow-up in 3 weeks  All questions answered           I have reviewed labs results, imaging, vitals, and medications with the patient today. Will follow up in 2 weeks  with me.    All questions answered to the best of my abilities    Patient verbalized understanding and is in agreement of the above plans.

## 2020-10-26 ENCOUNTER — LAB (OUTPATIENT)
Dept: LAB | Facility: HOSPITAL | Age: 65
End: 2020-10-26

## 2020-10-26 ENCOUNTER — OFFICE VISIT (OUTPATIENT)
Dept: ONCOLOGY | Facility: CLINIC | Age: 65
End: 2020-10-26

## 2020-10-26 VITALS
TEMPERATURE: 97.3 F | RESPIRATION RATE: 20 BRPM | HEIGHT: 65 IN | BODY MASS INDEX: 30.56 KG/M2 | DIASTOLIC BLOOD PRESSURE: 79 MMHG | WEIGHT: 183.4 LBS | HEART RATE: 77 BPM | SYSTOLIC BLOOD PRESSURE: 137 MMHG

## 2020-10-26 DIAGNOSIS — I82.722 CHRONIC DEEP VEIN THROMBOSIS (DVT) OF LEFT UPPER EXTREMITY, UNSPECIFIED VEIN (HCC): ICD-10-CM

## 2020-10-26 DIAGNOSIS — D61.810 PANCYTOPENIA DUE TO ANTINEOPLASTIC CHEMOTHERAPY (HCC): ICD-10-CM

## 2020-10-26 DIAGNOSIS — T45.1X5A PANCYTOPENIA DUE TO ANTINEOPLASTIC CHEMOTHERAPY (HCC): Primary | ICD-10-CM

## 2020-10-26 DIAGNOSIS — D61.810 PANCYTOPENIA DUE TO ANTINEOPLASTIC CHEMOTHERAPY (HCC): Primary | ICD-10-CM

## 2020-10-26 DIAGNOSIS — T45.1X5A PANCYTOPENIA DUE TO ANTINEOPLASTIC CHEMOTHERAPY (HCC): ICD-10-CM

## 2020-10-26 LAB
BASOPHILS # BLD AUTO: 0.02 10*3/MM3 (ref 0–0.2)
BASOPHILS NFR BLD AUTO: 0.4 % (ref 0–1.5)
DEPRECATED RDW RBC AUTO: 54.9 FL (ref 37–54)
EOSINOPHIL # BLD AUTO: 0.26 10*3/MM3 (ref 0–0.4)
EOSINOPHIL NFR BLD AUTO: 5.1 % (ref 0.3–6.2)
ERYTHROCYTE [DISTWIDTH] IN BLOOD BY AUTOMATED COUNT: 16.3 % (ref 12.3–15.4)
HCT VFR BLD AUTO: 38.3 % (ref 34–46.6)
HGB BLD-MCNC: 12.1 G/DL (ref 12–15.9)
INR PPP: 3.3
LYMPHOCYTES # BLD AUTO: 1.12 10*3/MM3 (ref 0.7–3.1)
LYMPHOCYTES NFR BLD AUTO: 22.2 % (ref 19.6–45.3)
MCH RBC QN AUTO: 29.9 PG (ref 26.6–33)
MCHC RBC AUTO-ENTMCNC: 31.6 G/DL (ref 31.5–35.7)
MCV RBC AUTO: 94.6 FL (ref 79–97)
MONOCYTES # BLD AUTO: 0.6 10*3/MM3 (ref 0.1–0.9)
MONOCYTES NFR BLD AUTO: 11.9 % (ref 5–12)
NEUTROPHILS NFR BLD AUTO: 3.05 10*3/MM3 (ref 1.7–7)
NEUTROPHILS NFR BLD AUTO: 60.4 % (ref 42.7–76)
PLATELET # BLD AUTO: 154 10*3/MM3 (ref 140–450)
PMV BLD AUTO: 11.5 FL (ref 6–12)
RBC # BLD AUTO: 4.05 10*6/MM3 (ref 3.77–5.28)
WBC # BLD AUTO: 5.05 10*3/MM3 (ref 3.4–10.8)

## 2020-10-26 PROCEDURE — 85610 PROTHROMBIN TIME: CPT

## 2020-10-26 PROCEDURE — 85025 COMPLETE CBC W/AUTO DIFF WBC: CPT

## 2020-10-26 PROCEDURE — 77373 STRTCTC BDY RAD THER TX DLVR: CPT | Performed by: RADIOLOGY

## 2020-10-26 PROCEDURE — 99214 OFFICE O/P EST MOD 30 MIN: CPT | Performed by: INTERNAL MEDICINE

## 2020-10-28 PROCEDURE — 77373 STRTCTC BDY RAD THER TX DLVR: CPT | Performed by: RADIOLOGY

## 2020-11-02 ENCOUNTER — HOSPITAL ENCOUNTER (OUTPATIENT)
Dept: ONCOLOGY | Facility: HOSPITAL | Age: 65
Setting detail: INFUSION SERIES
Discharge: HOME | End: 2020-11-02

## 2020-11-02 ENCOUNTER — LAB (OUTPATIENT)
Dept: LAB | Facility: HOSPITAL | Age: 65
End: 2020-11-02

## 2020-11-02 DIAGNOSIS — Z79.01 LONG TERM (CURRENT) USE OF ANTICOAGULANTS: Primary | ICD-10-CM

## 2020-11-02 DIAGNOSIS — I82.722 CHRONIC DEEP VEIN THROMBOSIS (DVT) OF LEFT UPPER EXTREMITY, UNSPECIFIED VEIN (HCC): ICD-10-CM

## 2020-11-02 LAB — INR PPP: 5.1

## 2020-11-02 PROCEDURE — G0463 HOSPITAL OUTPT CLINIC VISIT: HCPCS

## 2020-11-02 PROCEDURE — 85610 PROTHROMBIN TIME: CPT

## 2020-11-02 PROCEDURE — 36416 COLLJ CAPILLARY BLOOD SPEC: CPT

## 2020-11-04 ENCOUNTER — LAB (OUTPATIENT)
Dept: LAB | Facility: HOSPITAL | Age: 65
End: 2020-11-04

## 2020-11-04 ENCOUNTER — HOSPITAL ENCOUNTER (OUTPATIENT)
Dept: ONCOLOGY | Facility: HOSPITAL | Age: 65
Setting detail: INFUSION SERIES
Discharge: HOME | End: 2020-11-04

## 2020-11-04 DIAGNOSIS — I82.722 CHRONIC DEEP VEIN THROMBOSIS (DVT) OF LEFT UPPER EXTREMITY, UNSPECIFIED VEIN (HCC): ICD-10-CM

## 2020-11-04 DIAGNOSIS — Z79.01 LONG TERM (CURRENT) USE OF ANTICOAGULANTS: Primary | ICD-10-CM

## 2020-11-04 LAB — INR PPP: 3.4

## 2020-11-04 PROCEDURE — 85610 PROTHROMBIN TIME: CPT

## 2020-11-04 PROCEDURE — 36416 COLLJ CAPILLARY BLOOD SPEC: CPT

## 2020-11-05 ENCOUNTER — HOSPITAL ENCOUNTER (OUTPATIENT)
Dept: ONCOLOGY | Facility: HOSPITAL | Age: 65
Setting detail: INFUSION SERIES
End: 2020-11-05

## 2020-11-05 DIAGNOSIS — C56.1 MALIGNANT NEOPLASM OF RIGHT OVARY (HCC): Primary | ICD-10-CM

## 2020-11-05 RX ORDER — SODIUM CHLORIDE 9 MG/ML
250 INJECTION, SOLUTION INTRAVENOUS ONCE
Status: CANCELLED | OUTPATIENT
Start: 2020-11-06

## 2020-11-06 ENCOUNTER — APPOINTMENT (OUTPATIENT)
Dept: LAB | Facility: HOSPITAL | Age: 65
End: 2020-11-06

## 2020-11-06 ENCOUNTER — HOSPITAL ENCOUNTER (OUTPATIENT)
Dept: ONCOLOGY | Facility: HOSPITAL | Age: 65
Setting detail: INFUSION SERIES
Discharge: HOME | End: 2020-11-06

## 2020-11-06 ENCOUNTER — APPOINTMENT (OUTPATIENT)
Dept: ONCOLOGY | Facility: HOSPITAL | Age: 65
End: 2020-11-06

## 2020-11-06 VITALS
TEMPERATURE: 97.1 F | RESPIRATION RATE: 18 BRPM | WEIGHT: 181 LBS | BODY MASS INDEX: 30.16 KG/M2 | SYSTOLIC BLOOD PRESSURE: 123 MMHG | HEIGHT: 65 IN | HEART RATE: 77 BPM | DIASTOLIC BLOOD PRESSURE: 75 MMHG

## 2020-11-06 DIAGNOSIS — Z95.828 PORT-A-CATH IN PLACE: ICD-10-CM

## 2020-11-06 DIAGNOSIS — C56.1 MALIGNANT NEOPLASM OF RIGHT OVARY (HCC): ICD-10-CM

## 2020-11-06 DIAGNOSIS — D51.0 PERNICIOUS ANEMIA: ICD-10-CM

## 2020-11-06 DIAGNOSIS — Z45.2 ENCOUNTER FOR CARE RELATED TO VASCULAR ACCESS PORT: ICD-10-CM

## 2020-11-06 DIAGNOSIS — I82.722 CHRONIC DEEP VEIN THROMBOSIS (DVT) OF LEFT UPPER EXTREMITY, UNSPECIFIED VEIN (HCC): Primary | ICD-10-CM

## 2020-11-06 LAB
BASOPHILS # BLD AUTO: 0.02 10*3/MM3 (ref 0–0.2)
BASOPHILS NFR BLD AUTO: 0.3 % (ref 0–1.5)
DEPRECATED RDW RBC AUTO: 56.4 FL (ref 37–54)
EOSINOPHIL # BLD AUTO: 0.07 10*3/MM3 (ref 0–0.4)
EOSINOPHIL NFR BLD AUTO: 1.1 % (ref 0.3–6.2)
ERYTHROCYTE [DISTWIDTH] IN BLOOD BY AUTOMATED COUNT: 16.4 % (ref 12.3–15.4)
HCT VFR BLD AUTO: 38.1 % (ref 34–46.6)
HGB BLD-MCNC: 11.8 G/DL (ref 12–15.9)
INR PPP: 1.8
LYMPHOCYTES # BLD AUTO: 1.18 10*3/MM3 (ref 0.7–3.1)
LYMPHOCYTES NFR BLD AUTO: 18.6 % (ref 19.6–45.3)
MCH RBC QN AUTO: 29.7 PG (ref 26.6–33)
MCHC RBC AUTO-ENTMCNC: 31 G/DL (ref 31.5–35.7)
MCV RBC AUTO: 96 FL (ref 79–97)
MONOCYTES # BLD AUTO: 0.78 10*3/MM3 (ref 0.1–0.9)
MONOCYTES NFR BLD AUTO: 12.3 % (ref 5–12)
NEUTROPHILS NFR BLD AUTO: 4.29 10*3/MM3 (ref 1.7–7)
NEUTROPHILS NFR BLD AUTO: 67.7 % (ref 42.7–76)
PLATELET # BLD AUTO: 199 10*3/MM3 (ref 140–450)
PMV BLD AUTO: 10.6 FL (ref 6–12)
RBC # BLD AUTO: 3.97 10*6/MM3 (ref 3.77–5.28)
WBC # BLD AUTO: 6.34 10*3/MM3 (ref 3.4–10.8)

## 2020-11-06 PROCEDURE — 85025 COMPLETE CBC W/AUTO DIFF WBC: CPT | Performed by: INTERNAL MEDICINE

## 2020-11-06 PROCEDURE — 36415 COLL VENOUS BLD VENIPUNCTURE: CPT

## 2020-11-06 PROCEDURE — 96367 TX/PROPH/DG ADDL SEQ IV INF: CPT

## 2020-11-06 PROCEDURE — 36593 DECLOT VASCULAR DEVICE: CPT

## 2020-11-06 PROCEDURE — 25010000002 CYANOCOBALAMIN PER 1000 MCG: Performed by: INTERNAL MEDICINE

## 2020-11-06 PROCEDURE — 25010000002 ALTEPLASE 2 MG RECONSTITUTED SOLUTION: Performed by: NURSE PRACTITIONER

## 2020-11-06 PROCEDURE — 25010000002 TOPOTECAN 4 MG/4ML SOLUTION 4 ML VIAL: Performed by: INTERNAL MEDICINE

## 2020-11-06 PROCEDURE — 85610 PROTHROMBIN TIME: CPT | Performed by: NURSE PRACTITIONER

## 2020-11-06 PROCEDURE — 96372 THER/PROPH/DIAG INJ SC/IM: CPT

## 2020-11-06 PROCEDURE — 96413 CHEMO IV INFUSION 1 HR: CPT

## 2020-11-06 PROCEDURE — 25010000002 DEXAMETHASONE SODIUM PHOSPHATE 120 MG/30ML SOLUTION: Performed by: INTERNAL MEDICINE

## 2020-11-06 RX ORDER — HEPARIN SODIUM (PORCINE) LOCK FLUSH IV SOLN 100 UNIT/ML 100 UNIT/ML
500 SOLUTION INTRAVENOUS AS NEEDED
Status: DISCONTINUED | OUTPATIENT
Start: 2020-11-06 | End: 2020-11-07 | Stop reason: HOSPADM

## 2020-11-06 RX ORDER — CYANOCOBALAMIN 1000 UG/ML
1000 INJECTION, SOLUTION INTRAMUSCULAR; SUBCUTANEOUS ONCE
Status: COMPLETED | OUTPATIENT
Start: 2020-11-06 | End: 2020-11-06

## 2020-11-06 RX ORDER — SODIUM CHLORIDE 0.9 % (FLUSH) 0.9 %
20 SYRINGE (ML) INJECTION AS NEEDED
Status: CANCELLED | OUTPATIENT
Start: 2020-11-06

## 2020-11-06 RX ORDER — HEPARIN SODIUM (PORCINE) LOCK FLUSH IV SOLN 100 UNIT/ML 100 UNIT/ML
500 SOLUTION INTRAVENOUS AS NEEDED
Status: CANCELLED | OUTPATIENT
Start: 2020-11-06

## 2020-11-06 RX ORDER — SODIUM CHLORIDE 0.9 % (FLUSH) 0.9 %
20 SYRINGE (ML) INJECTION AS NEEDED
Status: DISCONTINUED | OUTPATIENT
Start: 2020-11-06 | End: 2020-11-07 | Stop reason: HOSPADM

## 2020-11-06 RX ORDER — SODIUM CHLORIDE 9 MG/ML
250 INJECTION, SOLUTION INTRAVENOUS ONCE
Status: COMPLETED | OUTPATIENT
Start: 2020-11-06 | End: 2020-11-06

## 2020-11-06 RX ADMIN — DEXAMETHASONE SODIUM PHOSPHATE 12 MG: 4 INJECTION, SOLUTION INTRA-ARTICULAR; INTRALESIONAL; INTRAMUSCULAR; INTRAVENOUS; SOFT TISSUE at 10:44

## 2020-11-06 RX ADMIN — SODIUM CHLORIDE 250 ML: 900 INJECTION, SOLUTION INTRAVENOUS at 10:44

## 2020-11-06 RX ADMIN — ALTEPLASE: 2.2 INJECTION, POWDER, LYOPHILIZED, FOR SOLUTION INTRAVENOUS at 11:14

## 2020-11-06 RX ADMIN — CYANOCOBALAMIN 1000 MCG: 1000 INJECTION INTRAMUSCULAR; SUBCUTANEOUS at 11:16

## 2020-11-06 RX ADMIN — SODIUM CHLORIDE 6.1 MG: 900 INJECTION, SOLUTION INTRAVENOUS at 11:02

## 2020-11-06 NOTE — PROGRESS NOTES
Patient here for Hycamtin no blood return on Port, activased port and was able to get blood return after 45min. IV Hycamtin given   Patient in for port flush and blood draw.  10 cc of blood wasted prior to specimen collection.  Specimens sent to lab for processing.

## 2020-11-10 ENCOUNTER — APPOINTMENT (OUTPATIENT)
Dept: MRI IMAGING | Facility: HOSPITAL | Age: 65
End: 2020-11-10

## 2020-11-12 DIAGNOSIS — C56.1 MALIGNANT NEOPLASM OF RIGHT OVARY (HCC): Primary | ICD-10-CM

## 2020-11-12 RX ORDER — SODIUM CHLORIDE 9 MG/ML
250 INJECTION, SOLUTION INTRAVENOUS ONCE
Status: CANCELLED | OUTPATIENT
Start: 2020-11-13

## 2020-11-13 ENCOUNTER — HOSPITAL ENCOUNTER (OUTPATIENT)
Dept: ONCOLOGY | Facility: HOSPITAL | Age: 65
Setting detail: INFUSION SERIES
Discharge: HOME | End: 2020-11-13

## 2020-11-13 VITALS
SYSTOLIC BLOOD PRESSURE: 147 MMHG | RESPIRATION RATE: 18 BRPM | WEIGHT: 181 LBS | BODY MASS INDEX: 30.16 KG/M2 | HEART RATE: 56 BPM | TEMPERATURE: 97.3 F | HEIGHT: 65 IN | DIASTOLIC BLOOD PRESSURE: 82 MMHG

## 2020-11-13 DIAGNOSIS — C56.1 MALIGNANT NEOPLASM OF RIGHT OVARY (HCC): Primary | ICD-10-CM

## 2020-11-13 DIAGNOSIS — Z95.828 PORT-A-CATH IN PLACE: ICD-10-CM

## 2020-11-13 LAB
BASOPHILS # BLD AUTO: 0.02 10*3/MM3 (ref 0–0.2)
BASOPHILS NFR BLD AUTO: 0.5 % (ref 0–1.5)
DEPRECATED RDW RBC AUTO: 52.4 FL (ref 37–54)
EOSINOPHIL # BLD AUTO: 0.07 10*3/MM3 (ref 0–0.4)
EOSINOPHIL NFR BLD AUTO: 1.7 % (ref 0.3–6.2)
ERYTHROCYTE [DISTWIDTH] IN BLOOD BY AUTOMATED COUNT: 15.7 % (ref 12.3–15.4)
HCT VFR BLD AUTO: 33.8 % (ref 34–46.6)
HGB BLD-MCNC: 10.5 G/DL (ref 12–15.9)
LYMPHOCYTES # BLD AUTO: 1.31 10*3/MM3 (ref 0.7–3.1)
LYMPHOCYTES NFR BLD AUTO: 31 % (ref 19.6–45.3)
MAGNESIUM SERPL-MCNC: 1.3 MG/DL (ref 1.6–2.4)
MCH RBC QN AUTO: 29.2 PG (ref 26.6–33)
MCHC RBC AUTO-ENTMCNC: 31.1 G/DL (ref 31.5–35.7)
MCV RBC AUTO: 94.2 FL (ref 79–97)
MONOCYTES # BLD AUTO: 0.45 10*3/MM3 (ref 0.1–0.9)
MONOCYTES NFR BLD AUTO: 10.7 % (ref 5–12)
NEUTROPHILS NFR BLD AUTO: 2.37 10*3/MM3 (ref 1.7–7)
NEUTROPHILS NFR BLD AUTO: 56.1 % (ref 42.7–76)
PLATELET # BLD AUTO: 122 10*3/MM3 (ref 140–450)
PMV BLD AUTO: 10.8 FL (ref 6–12)
RBC # BLD AUTO: 3.59 10*6/MM3 (ref 3.77–5.28)
WBC # BLD AUTO: 4.22 10*3/MM3 (ref 3.4–10.8)

## 2020-11-13 PROCEDURE — 83735 ASSAY OF MAGNESIUM: CPT | Performed by: INTERNAL MEDICINE

## 2020-11-13 PROCEDURE — 25010000002 DEXAMETHASONE SODIUM PHOSPHATE 120 MG/30ML SOLUTION: Performed by: INTERNAL MEDICINE

## 2020-11-13 PROCEDURE — 36591 DRAW BLOOD OFF VENOUS DEVICE: CPT

## 2020-11-13 PROCEDURE — 25010000002 TOPOTECAN 4 MG/4ML SOLUTION 4 ML VIAL: Performed by: INTERNAL MEDICINE

## 2020-11-13 PROCEDURE — 96367 TX/PROPH/DG ADDL SEQ IV INF: CPT

## 2020-11-13 PROCEDURE — 96413 CHEMO IV INFUSION 1 HR: CPT

## 2020-11-13 PROCEDURE — 25010000003 HEPARIN LOCK FLUSH PER 10 UNITS: Performed by: INTERNAL MEDICINE

## 2020-11-13 PROCEDURE — 85025 COMPLETE CBC W/AUTO DIFF WBC: CPT | Performed by: INTERNAL MEDICINE

## 2020-11-13 PROCEDURE — 96365 THER/PROPH/DIAG IV INF INIT: CPT

## 2020-11-13 RX ORDER — SODIUM CHLORIDE 0.9 % (FLUSH) 0.9 %
20 SYRINGE (ML) INJECTION AS NEEDED
Status: CANCELLED | OUTPATIENT
Start: 2020-11-13

## 2020-11-13 RX ORDER — SODIUM CHLORIDE 9 MG/ML
250 INJECTION, SOLUTION INTRAVENOUS ONCE
Status: COMPLETED | OUTPATIENT
Start: 2020-11-13 | End: 2020-11-13

## 2020-11-13 RX ORDER — SODIUM CHLORIDE 0.9 % (FLUSH) 0.9 %
20 SYRINGE (ML) INJECTION AS NEEDED
Status: DISCONTINUED | OUTPATIENT
Start: 2020-11-13 | End: 2020-11-14 | Stop reason: HOSPADM

## 2020-11-13 RX ORDER — HEPARIN SODIUM (PORCINE) LOCK FLUSH IV SOLN 100 UNIT/ML 100 UNIT/ML
500 SOLUTION INTRAVENOUS AS NEEDED
Status: CANCELLED | OUTPATIENT
Start: 2020-11-13

## 2020-11-13 RX ORDER — HEPARIN SODIUM (PORCINE) LOCK FLUSH IV SOLN 100 UNIT/ML 100 UNIT/ML
500 SOLUTION INTRAVENOUS AS NEEDED
Status: DISCONTINUED | OUTPATIENT
Start: 2020-11-13 | End: 2020-11-14 | Stop reason: HOSPADM

## 2020-11-13 RX ADMIN — SODIUM CHLORIDE 250 ML: 9 INJECTION, SOLUTION INTRAVENOUS at 10:56

## 2020-11-13 RX ADMIN — SODIUM CHLORIDE 6.1 MG: 900 INJECTION, SOLUTION INTRAVENOUS at 11:35

## 2020-11-13 RX ADMIN — Medication 20 ML: at 12:16

## 2020-11-13 RX ADMIN — DEXAMETHASONE SODIUM PHOSPHATE 12 MG: 4 INJECTION, SOLUTION INTRA-ARTICULAR; INTRALESIONAL; INTRAMUSCULAR; INTRAVENOUS; SOFT TISSUE at 10:56

## 2020-11-13 RX ADMIN — HEPARIN 500 UNITS: 100 SYRINGE at 12:16

## 2020-11-13 NOTE — PROGRESS NOTES
Marissa/Mallory    Please verify if patient is taking oral mag and at what dose.  MAR says as directed.     Electronically signed by IRENA Dumas, 11/13/20, 2:30 PM EST.

## 2020-11-13 NOTE — PROGRESS NOTES
Called pt and LVM asking what dose of oral Magnesium she is currently taking and to call us back to let us know so we can adjust her dose.

## 2020-11-16 ENCOUNTER — HOSPITAL ENCOUNTER (OUTPATIENT)
Dept: ONCOLOGY | Facility: HOSPITAL | Age: 65
Setting detail: INFUSION SERIES
Discharge: HOME | End: 2020-11-16

## 2020-11-16 ENCOUNTER — LAB (OUTPATIENT)
Dept: LAB | Facility: HOSPITAL | Age: 65
End: 2020-11-16

## 2020-11-16 DIAGNOSIS — C56.1 MALIGNANT NEOPLASM OF RIGHT OVARY (HCC): Primary | ICD-10-CM

## 2020-11-16 DIAGNOSIS — C56.1 MALIGNANT NEOPLASM OF RIGHT OVARY (HCC): ICD-10-CM

## 2020-11-16 LAB
BASOPHILS # BLD AUTO: 0.01 10*3/MM3 (ref 0–0.2)
BASOPHILS NFR BLD AUTO: 0.3 % (ref 0–1.5)
CANCER AG125 SERPL QL: 73.9 U/ML (ref 0–38.1)
DEPRECATED RDW RBC AUTO: 51.1 FL (ref 37–54)
EOSINOPHIL # BLD AUTO: 0.07 10*3/MM3 (ref 0–0.4)
EOSINOPHIL NFR BLD AUTO: 1.8 % (ref 0.3–6.2)
ERYTHROCYTE [DISTWIDTH] IN BLOOD BY AUTOMATED COUNT: 15.6 % (ref 12.3–15.4)
HCT VFR BLD AUTO: 34.5 % (ref 34–46.6)
HGB BLD-MCNC: 10.7 G/DL (ref 12–15.9)
LYMPHOCYTES # BLD AUTO: 1.28 10*3/MM3 (ref 0.7–3.1)
LYMPHOCYTES NFR BLD AUTO: 32.5 % (ref 19.6–45.3)
MAGNESIUM SERPL-MCNC: 1.6 MG/DL (ref 1.6–2.4)
MCH RBC QN AUTO: 29.2 PG (ref 26.6–33)
MCHC RBC AUTO-ENTMCNC: 31 G/DL (ref 31.5–35.7)
MCV RBC AUTO: 94.3 FL (ref 79–97)
MONOCYTES # BLD AUTO: 0.09 10*3/MM3 (ref 0.1–0.9)
MONOCYTES NFR BLD AUTO: 2.3 % (ref 5–12)
NEUTROPHILS NFR BLD AUTO: 2.49 10*3/MM3 (ref 1.7–7)
NEUTROPHILS NFR BLD AUTO: 63.1 % (ref 42.7–76)
PLATELET # BLD AUTO: 99 10*3/MM3 (ref 140–450)
PMV BLD AUTO: 10.5 FL (ref 6–12)
RBC # BLD AUTO: 3.66 10*6/MM3 (ref 3.77–5.28)
WBC # BLD AUTO: 3.94 10*3/MM3 (ref 3.4–10.8)

## 2020-11-16 PROCEDURE — 96372 THER/PROPH/DIAG INJ SC/IM: CPT

## 2020-11-16 PROCEDURE — 85025 COMPLETE CBC W/AUTO DIFF WBC: CPT

## 2020-11-16 PROCEDURE — 25010000002 FILGRASTIM 480 MCG/0.8ML SOLUTION PREFILLED SYRINGE: Performed by: INTERNAL MEDICINE

## 2020-11-16 PROCEDURE — 86304 IMMUNOASSAY TUMOR CA 125: CPT

## 2020-11-16 PROCEDURE — 83735 ASSAY OF MAGNESIUM: CPT | Performed by: NURSE PRACTITIONER

## 2020-11-16 PROCEDURE — 36415 COLL VENOUS BLD VENIPUNCTURE: CPT

## 2020-11-16 RX ADMIN — FILGRASTIM 480 MCG: 480 INJECTION, SOLUTION INTRAVENOUS; SUBCUTANEOUS at 11:36

## 2020-11-16 NOTE — PROGRESS NOTES
Per Valencia Cadena NP no IV mag needed today. Pt verbalized understanding to continue her oral magnesium.

## 2020-11-18 RX ORDER — WARFARIN SODIUM 5 MG/1
5 TABLET ORAL
Qty: 90 TABLET | Refills: 0 | Status: SHIPPED | OUTPATIENT
Start: 2020-11-18 | End: 2021-01-08 | Stop reason: SDUPTHER

## 2020-11-19 ENCOUNTER — OFFICE VISIT (OUTPATIENT)
Dept: ONCOLOGY | Facility: CLINIC | Age: 65
End: 2020-11-19

## 2020-11-19 ENCOUNTER — APPOINTMENT (OUTPATIENT)
Dept: LAB | Facility: HOSPITAL | Age: 65
End: 2020-11-19

## 2020-11-19 VITALS
BODY MASS INDEX: 29.82 KG/M2 | SYSTOLIC BLOOD PRESSURE: 117 MMHG | RESPIRATION RATE: 18 BRPM | WEIGHT: 179 LBS | TEMPERATURE: 97.7 F | HEIGHT: 65 IN | DIASTOLIC BLOOD PRESSURE: 76 MMHG | HEART RATE: 88 BPM

## 2020-11-19 DIAGNOSIS — C56.1 MALIGNANT NEOPLASM OF RIGHT OVARY (HCC): Primary | ICD-10-CM

## 2020-11-19 PROCEDURE — 99215 OFFICE O/P EST HI 40 MIN: CPT | Performed by: INTERNAL MEDICINE

## 2020-11-30 ENCOUNTER — HOSPITAL ENCOUNTER (OUTPATIENT)
Dept: CT IMAGING | Facility: HOSPITAL | Age: 65
Discharge: HOME | End: 2020-11-30
Admitting: INTERNAL MEDICINE

## 2020-11-30 DIAGNOSIS — C56.1 MALIGNANT NEOPLASM OF RIGHT OVARY (HCC): ICD-10-CM

## 2020-11-30 LAB — CREAT BLDA-MCNC: 0.9 MG/DL (ref 0.6–1.3)

## 2020-11-30 PROCEDURE — 74177 CT ABD & PELVIS W/CONTRAST: CPT

## 2020-11-30 PROCEDURE — 0 IOPAMIDOL PER 1 ML: Performed by: INTERNAL MEDICINE

## 2020-11-30 PROCEDURE — 82565 ASSAY OF CREATININE: CPT

## 2020-11-30 PROCEDURE — 71260 CT THORAX DX C+: CPT

## 2020-11-30 RX ADMIN — IOPAMIDOL 100 ML: 755 INJECTION, SOLUTION INTRAVENOUS at 11:07

## 2020-12-02 RX ORDER — TRIAMTERENE AND HYDROCHLOROTHIAZIDE 37.5; 25 MG/1; MG/1
CAPSULE ORAL
Qty: 90 CAPSULE | Refills: 1 | Status: SHIPPED | OUTPATIENT
Start: 2020-12-02 | End: 2021-01-01

## 2020-12-08 ENCOUNTER — APPOINTMENT (OUTPATIENT)
Dept: MRI IMAGING | Facility: HOSPITAL | Age: 65
End: 2020-12-08

## 2020-12-09 ENCOUNTER — TELEMEDICINE (OUTPATIENT)
Dept: FAMILY MEDICINE CLINIC | Facility: CLINIC | Age: 65
End: 2020-12-09

## 2020-12-09 DIAGNOSIS — G25.81 RESTLESS LEGS SYNDROME: ICD-10-CM

## 2020-12-09 DIAGNOSIS — J40 BRONCHITIS: Primary | ICD-10-CM

## 2020-12-09 PROCEDURE — 99213 OFFICE O/P EST LOW 20 MIN: CPT | Performed by: FAMILY MEDICINE

## 2020-12-09 RX ORDER — AZITHROMYCIN 250 MG/1
TABLET, FILM COATED ORAL
Qty: 6 TABLET | Refills: 0 | Status: SHIPPED | OUTPATIENT
Start: 2020-12-09 | End: 2020-12-16

## 2020-12-09 RX ORDER — GUAIFENESIN AND CODEINE PHOSPHATE 100; 10 MG/5ML; MG/5ML
5 SOLUTION ORAL 3 TIMES DAILY PRN
Qty: 120 ML | Refills: 0 | Status: SHIPPED | OUTPATIENT
Start: 2020-12-09 | End: 2020-12-16

## 2020-12-09 NOTE — PROGRESS NOTES
Subjective   Chief Complaint   Patient presents with   • Cough     Tahira Zimmerman is a 65 y.o. female.   You have chosen to receive care through a telehealth visit.  Do you consent to use a video/audio connection for your medical care today? Yes    She recently had a negative COVID19 test.    Cough  This is a new problem. The current episode started 1 to 4 weeks ago. The problem has been unchanged. The cough is productive of purulent sputum. Associated symptoms include headaches. Pertinent negatives include no chest pain, chills, ear pain, fever, rash, rhinorrhea, sore throat, shortness of breath or wheezing. Nothing aggravates the symptoms. She has tried nothing for the symptoms.      Past Medical History:   Diagnosis Date   • Anemia 2013   • Cervical disc disorder 2013    Herniated disc, pinched nerve   • Clotting disorder (CMS/HCC) 2013    Low platelets from chemo   • Colon polyp 2013   • Deep vein thrombosis (CMS/HCC) 2013   • Diverticulosis 2013   • GERD (gastroesophageal reflux disease) 2016   • HL (hearing loss) 2016    I need hearing aids   • Hyperlipidemia 2013    Need my cholesterol rechecked   • Hypertension    • Joint pain 2013    Shoulder pain, torn rotator cuff   • Low back pain 2013   • Neuromuscular disorder (CMS/HCC) 2015    Shingles, pinched nerves in my neck   • Osteopenia 2014   • Osteoporosis    • Ovarian cancer (CMS/HCC)     ovarian   • Ovarian cancer on left (CMS/HCC) 1995   • Pneumonia 2010    Have had it several times   • Spinal stenosis 2013    Cervical   • Urinary tract infection 2013    Have had several utis     Past Surgical History:   Procedure Laterality Date   • COLON SURGERY     • COLONOSCOPY  05/26/2018   • EXPLORATORY LAPAROTOMY     • HERNIA REPAIR     • HYSTERECTOMY     • OOPHORECTOMY       Allergies   Allergen Reactions   • Morphine Nausea Only and Hives   • Penicillin V Potassium Rash   • Sulfa Antibiotics Rash   • Levaquin [Levofloxacin] Nausea Only and Dizziness     When  taken with Coumadin   • Amoxicillin Rash   • Norco [Hydrocodone-Acetaminophen] Rash     Social History     Socioeconomic History   • Marital status:      Spouse name: Not on file   • Number of children: Not on file   • Years of education: Not on file   • Highest education level: Not on file   Tobacco Use   • Smoking status: Never Smoker   • Smokeless tobacco: Never Used   Substance and Sexual Activity   • Alcohol use: No     Frequency: Never     Comment: caffeine 32-64oz qd   • Drug use: No   • Sexual activity: Not Currently     Partners: Male     Birth control/protection: Surgical     Social History     Tobacco Use   Smoking Status Never Smoker   Smokeless Tobacco Never Used       family history includes Cancer in her father; Hypertension in her brother, father, mother, and sister; Stroke in her brother.  Current Outpatient Medications on File Prior to Visit   Medication Sig Dispense Refill   • acetaminophen (TYLENOL) 500 MG tablet Take 1,000 mg by mouth Every 6 (Six) Hours As Needed for Mild Pain .     • atorvastatin (LIPITOR) 20 MG tablet Take 20 mg by mouth every night at bedtime.  3   • cyanocobalamin 1000 MCG/ML injection Inject 1,000 mcg into the appropriate muscle as directed by prescriber Every 28 (Twenty-Eight) Days.     • famotidine (PEPCID) 40 MG tablet TAKE 1 TABLET BY MOUTH EVERY MORNING BEFORE BREAKFAST 90 tablet 1   • loperamide (Imodium A-D) 2 MG tablet Take 1 tablet by mouth 3 (Three) Times a Day As Needed for Diarrhea. 60 tablet 2   • magnesium oxide (MAG-OX) 400 MG tablet TAKE 1 TABLET BY MOUTH TWICE A  tablet 1   • ondansetron ODT (ZOFRAN-ODT) 8 MG disintegrating tablet Place 1 tablet under the tongue Every 8 (Eight) Hours As Needed for Nausea or Vomiting. 30 tablet 3   • oxyCODONE (ROXICODONE) 10 MG tablet Take 1 tablet by mouth 3 (Three) Times a Day As Needed for Moderate Pain . 90 tablet 0   • potassium chloride (K-DUR,KLOR-CON) 20 MEQ CR tablet Take 40 mEq by mouth Every  Evening.     • pregabalin (LYRICA) 100 MG capsule Take 1 capsule by mouth 3 (Three) Times a Day. 90 capsule 2   • sertraline (ZOLOFT) 50 MG tablet TAKE 1 TABLET BY MOUTH EVERY EVENING BEFORE BED 90 tablet 1   • temazepam (RESTORIL) 30 MG capsule TAKE 1 CAPSULE BY MOUTH AT NIGHT AS NEEDED FOR SLEEP. 30 capsule 1   • traZODone (DESYREL) 50 MG tablet Take 1 tablet by mouth Every Night. 30 tablet 1   • triamterene-hydrochlorothiazide (DYAZIDE) 37.5-25 MG per capsule TAKE 1 CAPSULE BY MOUTH EVERY DAY 90 capsule 1   • warfarin (COUMADIN) 5 MG tablet Take 1 tablet by mouth Daily. At 1700 as directed 90 tablet 0     No current facility-administered medications on file prior to visit.      Patient Active Problem List   Diagnosis   • Malignant neoplasm of right ovary (CMS/HCC)   • Hypomagnesemia   • Left upper extremity deep vein thrombosis (CMS/HCC)   • Pernicious anemia   • Malabsorption due to intolerance, not elsewhere classified   • MELANI (iron deficiency anemia)   • Pancytopenia due to antineoplastic chemotherapy (CMS/HCC)   • Encounter for antineoplastic chemotherapy   • Long term (current) use of anticoagulants   • Monitoring for long-term anticoagulant use   • Leg pain, bilateral   • Osteoarthritis of cervical spine without myelopathy   • Other long term (current) drug therapy   • Pain   • Hyperlipidemia   • Essential hypertension   • Antineoplastic chemotherapy induced anemia   • Cervical disc disorder with radiculopathy   • Restless leg syndrome   • Dysuria   • Fibromyositis   • Port-A-Cath in place   • Encounter for medication management   • Chronic headaches   • Welcome to Medicare preventive visit   • Encounter for screening mammogram for breast cancer   • Postmenopausal   • Screening for diabetes mellitus   • Screening, ischemic heart disease   • Encounter for hepatitis C screening test for low risk patient   • Need for immunization against influenza   • Anxiety   • Brain metastases (CMS/HCC)   • DVT (deep venous  thrombosis) (CMS/[facility])   • Family history of cerebrovascular accident (CVA)   • Family history of diabetes mellitus   • Insomnia   • Lung nodule   • Mass of skin   • Osteoporosis   • Shingles   • History of DVT (deep vein thrombosis)   • Obesity (BMI 30-39.9)   • Depression   • Anemia   • Mastoiditis of left side   • Encounter for care related to vascular access port       The following portions of the patient's history were reviewed and updated as appropriate: allergies, current medications, past family history, past medical history, past social history, past surgical history and problem list.    Review of Systems   Constitutional: Negative for chills and fever.   HENT: Negative for ear pain, rhinorrhea, sinus pressure, sore throat and swollen glands.    Eyes: Negative for blurred vision.   Respiratory: Positive for cough. Negative for shortness of breath and wheezing.    Cardiovascular: Negative for chest pain and palpitations.   Gastrointestinal: Negative for abdominal pain.   Endocrine: Negative for polyuria.   Genitourinary: Negative for difficulty urinating.   Skin: Negative for rash.   Neurological: Negative for dizziness, seizures and headache.   Hematological: Negative for adenopathy.   Psychiatric/Behavioral: Negative for depressed mood.       Objective   LMP  (LMP Unknown)   Physical Exam  Constitutional:       Appearance: Normal appearance.   HENT:      Head: Normocephalic and atraumatic.   Pulmonary:      Effort: Pulmonary effort is normal.   Neurological:      Mental Status: She is alert.   Psychiatric:         Mood and Affect: Mood normal.         No visits with results within 1 Week(s) from this visit.   Latest known visit with results is:   Hospital Outpatient Visit on 11/30/2020   Component Date Value Ref Range Status   • Creatinine 11/30/2020 0.90  0.60 - 1.30 mg/dL Final    Serial Number: [id number]:  [id number]           Assessment/Plan   Diagnoses and all orders for this visit:    1. Bronchitis  (Primary)  Comments:  Treat with Zpak and cough syrup as ordered.  If not better in 2-3 days she may need a chest x-ray.  Orders:  -     azithromycin (Zithromax Z-Victoriano) 250 MG tablet; Take 2 tablets the first day, then 1 tablet daily for 4 days.  Dispense: 6 tablet; Refill: 0  -     guaiFENesin-codeine (GUAIFENESIN AC) 100-10 MG/5ML liquid; Take 5 mL by mouth 3 (Three) Times a Day As Needed for Cough.  Dispense: 120 mL; Refill: 0    Total time spent on evaluation of patient:  15 min.

## 2020-12-10 ENCOUNTER — PATIENT MESSAGE (OUTPATIENT)
Dept: FAMILY MEDICINE CLINIC | Facility: CLINIC | Age: 65
End: 2020-12-10

## 2020-12-11 RX ORDER — TEMAZEPAM 30 MG/1
30 CAPSULE ORAL NIGHTLY PRN
Qty: 30 CAPSULE | Refills: 1 | Status: ON HOLD | OUTPATIENT
Start: 2020-12-11 | End: 2021-01-01

## 2020-12-14 ENCOUNTER — TELEPHONE (OUTPATIENT)
Dept: ONCOLOGY | Facility: CLINIC | Age: 65
End: 2020-12-14

## 2020-12-15 ENCOUNTER — TELEPHONE (OUTPATIENT)
Dept: RADIATION ONCOLOGY | Facility: HOSPITAL | Age: 65
End: 2020-12-15

## 2020-12-15 ENCOUNTER — HOSPITAL ENCOUNTER (OUTPATIENT)
Dept: MRI IMAGING | Facility: HOSPITAL | Age: 65
Discharge: HOME | End: 2020-12-15
Admitting: RADIOLOGY

## 2020-12-15 DIAGNOSIS — C79.31 BRAIN METASTASES: ICD-10-CM

## 2020-12-15 PROCEDURE — A9579 GAD-BASE MR CONTRAST NOS,1ML: HCPCS | Performed by: RADIOLOGY

## 2020-12-15 PROCEDURE — 25010000002 GADOTERIDOL PER 1 ML: Performed by: RADIOLOGY

## 2020-12-15 PROCEDURE — 70553 MRI BRAIN STEM W/O & W/DYE: CPT

## 2020-12-15 RX ADMIN — GADOTERIDOL 20 ML: 279.3 INJECTION, SOLUTION INTRAVENOUS at 09:03

## 2020-12-16 ENCOUNTER — OFFICE VISIT (OUTPATIENT)
Dept: RADIATION ONCOLOGY | Facility: HOSPITAL | Age: 65
End: 2020-12-16

## 2020-12-16 ENCOUNTER — TELEPHONE (OUTPATIENT)
Dept: ONCOLOGY | Facility: CLINIC | Age: 65
End: 2020-12-16

## 2020-12-16 VITALS
TEMPERATURE: 96.8 F | WEIGHT: 181.6 LBS | HEART RATE: 76 BPM | BODY MASS INDEX: 30.26 KG/M2 | HEIGHT: 65 IN | SYSTOLIC BLOOD PRESSURE: 137 MMHG | OXYGEN SATURATION: 96 % | DIASTOLIC BLOOD PRESSURE: 78 MMHG

## 2020-12-16 DIAGNOSIS — C79.31 BRAIN METASTASES: Primary | ICD-10-CM

## 2020-12-16 PROCEDURE — 99024 POSTOP FOLLOW-UP VISIT: CPT | Performed by: RADIOLOGY

## 2020-12-16 NOTE — PROGRESS NOTES
FOLLOW-UP NOTE    Name: Tahira Zimmerman  YOB: 1955  MRN #: [id number]  Date of Service: 12/16/2020  Referring Provider: No referring provider defined for this encounter.  Primary Care Provider: Noemy Queen MD    DIAGNOSIS: Recurrent ovarian cancer, 2 brain mets   Treated with SRS  1. Brain metastases (CMS/HCC)        REASON FOR VISIT: Brain metastases f/u    RADIATION TREATMENT COURSE: 27 Gy in 3 fractions, completed 10/28/2020 to both the enlarging left parietal cortex lesion ([id number]) and the left superior cerebellar lesion (13 x 12 x 12 mm)    HISTORY OF PRESENT ILLNESS: The patient is a 65 y.o. year old female who started topotecan after completing her SRS treatment on 10/28/2020.    Reviewing her chemo record, she had her third cycle on 11/6/2020 and has missed appts since.  She states she forgot to reschedule.    Clinically, she does not the same light headache on the left but states it is a bit more persistent.  Interval MRI brain was ordered and performed on 12/15/2020:  -Left parietal lobe enhancing metastatic lesion now measures 12 x 11 mm with new vasogenic edema. On my independent review it looks like central necrosis with psuedoprogression on imaging.  The left superior cerebellar lesion is stable in size at 13 x 13 mm with also the appearance of less enhancement and central necrosis on my review.    I am working to coordinate chemotherapy and short interval MRI study.  We discussed trental/Pete but given her Coumadin this is not advised. Short steroids may help the headaches.        The following portions of the patient's history were reviewed and updated as appropriate: allergies, current medications, past family history, past medical history, past social history, past surgical history and problem list. Reviewed with the patient and remain unchanged.    PAST MEDICAL HISTORY:  she  has a past medical history of Anemia (2013), Cervical disc disorder (2013), Clotting disorder  (CMS/[facility]) (2013), Colon polyp (2013), Deep vein thrombosis (CMS/[facility]) (2013), Diverticulosis (2013), GERD (gastroesophageal reflux disease) (2016), HL (hearing loss) (2016), Hyperlipidemia (2013), Hypertension, Joint pain (2013), Low back pain (2013), Neuromuscular disorder (CMS/[facility]) (2015), Osteopenia (2014), Osteoporosis, Ovarian cancer (CMS/[facility]), Ovarian cancer on left (CMS/[facility]) (1995), Pneumonia (2010), Spinal stenosis (2013), and Urinary tract infection (2013).  MEDICATIONS:   Current Outpatient Medications:   •  acetaminophen (TYLENOL) 500 MG tablet, Take 1,000 mg by mouth Every 6 (Six) Hours As Needed for Mild Pain ., Disp: , Rfl:   •  atorvastatin (LIPITOR) 20 MG tablet, Take 20 mg by mouth every night at bedtime., Disp: , Rfl: 3  •  cyanocobalamin 1000 MCG/ML injection, Inject 1,000 mcg into the appropriate muscle as directed by prescriber Every 28 (Twenty-Eight) Days., Disp: , Rfl:   •  famotidine (PEPCID) 40 MG tablet, TAKE 1 TABLET BY MOUTH EVERY MORNING BEFORE BREAKFAST, Disp: 90 tablet, Rfl: 1  •  loperamide (Imodium A-D) 2 MG tablet, Take 1 tablet by mouth 3 (Three) Times a Day As Needed for Diarrhea., Disp: 60 tablet, Rfl: 2  •  magnesium oxide (MAG-OX) 400 MG tablet, TAKE 1 TABLET BY MOUTH TWICE A DAY, Disp: 180 tablet, Rfl: 1  •  ondansetron ODT (ZOFRAN-ODT) 8 MG disintegrating tablet, Place 1 tablet under the tongue Every 8 (Eight) Hours As Needed for Nausea or Vomiting., Disp: 30 tablet, Rfl: 3  •  potassium chloride (K-DUR,KLOR-CON) 20 MEQ CR tablet, Take 40 mEq by mouth Every Evening., Disp: , Rfl:   •  pregabalin (LYRICA) 100 MG capsule, Take 1 capsule by mouth 3 (Three) Times a Day., Disp: 90 capsule, Rfl: 2  •  sertraline (ZOLOFT) 50 MG tablet, TAKE 1 TABLET BY MOUTH EVERY EVENING BEFORE BED, Disp: 90 tablet, Rfl: 1  •  temazepam (RESTORIL) 30 MG capsule, Take 1 capsule by mouth At Night As Needed for Sleep., Disp: 30 capsule, Rfl: 1  •  triamterene-hydrochlorothiazide (DYAZIDE) 37.5-25 MG  "per capsule, TAKE 1 CAPSULE BY MOUTH EVERY DAY, Disp: 90 capsule, Rfl: 1  •  warfarin (COUMADIN) 5 MG tablet, Take 1 tablet by mouth Daily. At 1700 as directed, Disp: 90 tablet, Rfl: 0  •  oxyCODONE (ROXICODONE) 10 MG tablet, Take 1 tablet by mouth 3 (Three) Times a Day As Needed for Moderate Pain ., Disp: 90 tablet, Rfl: 0  ALLERGIES:   Allergies   Allergen Reactions   • Morphine Nausea Only and Hives   • Penicillin V Potassium Rash   • Sulfa Antibiotics Rash   • Levaquin [Levofloxacin] Nausea Only and Dizziness     When taken with Coumadin   • Amoxicillin Rash   • Norco [Hydrocodone-Acetaminophen] Rash     PAST SURGICAL HISTORY: she has a past surgical history that includes Hysterectomy; Hernia repair; Colon surgery; Colonoscopy (05/26/2018); Exploratory laparotomy; and Oophorectomy.  PREVIOUS RADIOTHERAPY OR CHEMOTHERAPY: yes  FAMILY HISTORY: her family history includes Cancer in her father; Hypertension in her brother, father, mother, and sister; Stroke in her brother.  SOCIAL HISTORY: she  reports that she has never smoked. She has never used smokeless tobacco. She reports that she does not drink alcohol or use drugs.  PAIN AND PAIN MANAGEMENT: Has oxycodone if needed.  Vitals:    12/16/20 1413   BP: 137/78   Pulse: 76   Temp: 96.8 °F (36 °C)   TempSrc: Infrared   SpO2: 96%   Weight: 82.4 kg (181 lb 9.6 oz)   Height: 165.1 cm (65\")   PainSc: 0-No pain     NUTRITIONAL STATUS:  no issues  KPS: 70    Review of Systems:   General: No fevers, chills, weight change, or drenching night sweats. Skin: No rashes or jaundice.  HEENT: No change in vision or hearing, no headaches.  Neck: No dysphagia or masses.  Heme/Lymph: No easy bruising or bleeding.  Respiratory System: No shortness of breath or cough.  Cardiovascular: No chest pain, palpitations, or dyspnea on exertion.  - Pacemaker. GI: No nausea, vomiting, diarrhea, melena, or hematochezia.  : No dysuria or hematuria.  Endocrine: No heat or cold intolerance. " "Musculoskeletal: No myalgias or arthralgias.  Neuro: Interval persistence, slight increase in headache frequency; No weakness, numbness, syncope, or seizures. Psych: No mood changes or depression. Ext: Denies swelling.        Objective     Vitals:  Vitals:    12/16/20 1413   BP: 137/78   Pulse: 76   Temp: 96.8 °F (36 °C)   TempSrc: Infrared   SpO2: 96%   Weight: 82.4 kg (181 lb 9.6 oz)   Height: 165.1 cm (65\")   PainSc: 0-No pain       PHYSICAL EXAM:  GENERAL: in no apparent distress, sitting comfortably in room.    HEENT: normocephalic, atraumatic. Pupils are equal, round, reactive to light. Sclera anicteric. Conjunctiva not injected. Oropharynx without erythema, ulcerations or thrush.   NECK: Supple with no masses.  LYMPHATIC: no cervical, supraclavicular or axillary adenopathy appreciated bilaterally.   CARDIOVASCULAR: S1 & S2 detected; no murmurs, rubs or gallops.  CHEST: clear to auscultation bilaterally; no wheezes, crackles or rubs. Work of breathing normal.  ABDOMEN: bowel sounds present. Abdomen is soft, nontender, nondistended.   MUSCULOSKELETAL: no tenderness to palpation along the spine or scapulae. Normal range of motion.  EXTREMITIES: no clubbing, cyanosis, edema.  SKIN: no erythema, rashes, ulcerations noted.   NEUROLOGIC: cranial nerves II-XII grossly intact bilaterally. No focal neurologic deficits.  PSYCHIATRIC:  alert, aware, and appropriate.      PERTINENT IMAGING/PATHOLOGY/LABS (Medical Decision Making):     COORDINATION OF CARE: A copy of this note is sent to the referring provider.    PATHOLOGY (Reviewed):     IMAGING (Reviewed): MRI brain extensively reviewed and discussed with my colleague-Dr. Ortega and Dr. Farah about my impression of the study.    LABS (Reviewed):  Hematology WBC   Date Value Ref Range Status   11/16/2020 3.94 3.40 - 10.80 10*3/mm3 Final   07/05/2020 4.04 (L) 4.5 - 11.0 10*3/uL Final     RBC   Date Value Ref Range Status   11/16/2020 3.66 (L) 3.77 - 5.28 10*6/mm3 Final "   07/05/2020 3.68 (L) 4.0 - 5.2 10*6/uL Final     Hemoglobin   Date Value Ref Range Status   11/16/2020 10.7 (L) 12.0 - 15.9 g/dL Final   07/05/2020 11.5 (L) 12.0 - 16.0 g/dL Final     Hematocrit   Date Value Ref Range Status   11/16/2020 34.5 34.0 - 46.6 % Final   07/05/2020 35.3 (L) 36.0 - 46.0 % Final     Platelets   Date Value Ref Range Status   11/16/2020 99 (L) 140 - 450 10*3/mm3 Final   07/05/2020 158 140 - 440 10*3/uL Final      Chemistry   Lab Results   Component Value Date    GLUCOSE 134 (H) 10/02/2020    BUN  10/02/2020      Comment:      Testing performed by alternate method    BUN 26 (H) 10/02/2020    CREATININE 0.90 11/30/2020    EGFRIFNONA 56 (L) 10/02/2020    EGFRIFAFRI >60 06/07/2019    BCR  10/02/2020      Comment:      Testing not performed    K 4.1 10/02/2020    CO2 25.0 10/02/2020    CALCIUM 9.1 10/02/2020    ALBUMIN 3.90 09/30/2020    LABIL2 1.3 07/03/2020    AST 20 09/30/2020    ALT 11 09/30/2020         Assessment/Plan     ASSESSMENT AND PLAN:    1. Brain metastases (CMS/HCC)       -Suspected pseudoprogression of her 2 lesions.  No new lesions.  Areas show treatment effect and central reduction in enhancement/necrosis.  Will do a medrol dosepak--she was reluctant but I told her if her headaches rebound we can do longer steroids.     We are expediting her care back to med/onc to get back on systemic therapy as she missed appt and hasn't rescheduled.    MRI brain has been ordered for 6-7 weeks but long discussion of importance to alert us if her symptoms are changing or more frequent.    This assessment comes from my review of the imaging, pathology, physician notes and other pertinent information as mentioned.        CC: MD Partha Espinoza MD  12/16/2020  2:48 PM EST

## 2020-12-17 ENCOUNTER — OFFICE VISIT (OUTPATIENT)
Dept: ONCOLOGY | Facility: CLINIC | Age: 65
End: 2020-12-17

## 2020-12-17 ENCOUNTER — TELEPHONE (OUTPATIENT)
Dept: ONCOLOGY | Facility: CLINIC | Age: 65
End: 2020-12-17

## 2020-12-17 ENCOUNTER — HOSPITAL ENCOUNTER (OUTPATIENT)
Dept: ONCOLOGY | Facility: HOSPITAL | Age: 65
Setting detail: INFUSION SERIES
Discharge: HOME | End: 2020-12-17

## 2020-12-17 ENCOUNTER — LAB (OUTPATIENT)
Dept: LAB | Facility: HOSPITAL | Age: 65
End: 2020-12-17

## 2020-12-17 VITALS
WEIGHT: 181 LBS | HEIGHT: 65 IN | SYSTOLIC BLOOD PRESSURE: 136 MMHG | TEMPERATURE: 96.9 F | DIASTOLIC BLOOD PRESSURE: 90 MMHG | RESPIRATION RATE: 18 BRPM | HEART RATE: 92 BPM | BODY MASS INDEX: 30.16 KG/M2

## 2020-12-17 DIAGNOSIS — C56.1 MALIGNANT NEOPLASM OF RIGHT OVARY (HCC): Primary | ICD-10-CM

## 2020-12-17 DIAGNOSIS — D51.0 PERNICIOUS ANEMIA: Primary | ICD-10-CM

## 2020-12-17 DIAGNOSIS — J40 BRONCHITIS: ICD-10-CM

## 2020-12-17 DIAGNOSIS — I82.722 CHRONIC DEEP VEIN THROMBOSIS (DVT) OF LEFT UPPER EXTREMITY, UNSPECIFIED VEIN (HCC): ICD-10-CM

## 2020-12-17 DIAGNOSIS — T45.1X5A ANTINEOPLASTIC CHEMOTHERAPY INDUCED ANEMIA: ICD-10-CM

## 2020-12-17 DIAGNOSIS — C56.1 MALIGNANT NEOPLASM OF RIGHT OVARY (HCC): ICD-10-CM

## 2020-12-17 DIAGNOSIS — D64.81 ANTINEOPLASTIC CHEMOTHERAPY INDUCED ANEMIA: ICD-10-CM

## 2020-12-17 LAB
ALBUMIN SERPL-MCNC: 4.1 G/DL (ref 3.5–5.2)
ALBUMIN/GLOB SERPL: 1.5 G/DL
ALP SERPL-CCNC: 159 U/L (ref 39–117)
ALT SERPL W P-5'-P-CCNC: 8 U/L (ref 1–33)
ANION GAP SERPL CALCULATED.3IONS-SCNC: 9 MMOL/L (ref 5–15)
AST SERPL-CCNC: 17 U/L (ref 1–32)
BASOPHILS # BLD AUTO: 0.03 10*3/MM3 (ref 0–0.2)
BASOPHILS NFR BLD AUTO: 0.4 % (ref 0–1.5)
BILIRUB SERPL-MCNC: 0.3 MG/DL (ref 0–1.2)
BUN SERPL-MCNC: 14 MG/DL (ref 8–23)
BUN/CREAT SERPL: 14.7 (ref 7–25)
CALCIUM SPEC-SCNC: 9.8 MG/DL (ref 8.6–10.5)
CANCER AG125 SERPL QL: 109.6 U/ML (ref 0–38.1)
CHLORIDE SERPL-SCNC: 101 MMOL/L (ref 98–107)
CO2 SERPL-SCNC: 29 MMOL/L (ref 22–29)
CREAT SERPL-MCNC: 0.95 MG/DL (ref 0.57–1)
DEPRECATED RDW RBC AUTO: 50.3 FL (ref 37–54)
EOSINOPHIL # BLD AUTO: 0.09 10*3/MM3 (ref 0–0.4)
EOSINOPHIL NFR BLD AUTO: 1.2 % (ref 0.3–6.2)
ERYTHROCYTE [DISTWIDTH] IN BLOOD BY AUTOMATED COUNT: 15.6 % (ref 12.3–15.4)
GFR SERPL CREATININE-BSD FRML MDRD: 59 ML/MIN/1.73
GLOBULIN UR ELPH-MCNC: 2.7 GM/DL
GLUCOSE SERPL-MCNC: 88 MG/DL (ref 65–99)
HCT VFR BLD AUTO: 38.3 % (ref 34–46.6)
HGB BLD-MCNC: 11.9 G/DL (ref 12–15.9)
INR PPP: 1.3
LYMPHOCYTES # BLD AUTO: 1.41 10*3/MM3 (ref 0.7–3.1)
LYMPHOCYTES NFR BLD AUTO: 19.2 % (ref 19.6–45.3)
MAGNESIUM SERPL-MCNC: 1.6 MG/DL (ref 1.6–2.4)
MCH RBC QN AUTO: 29 PG (ref 26.6–33)
MCHC RBC AUTO-ENTMCNC: 31.1 G/DL (ref 31.5–35.7)
MCV RBC AUTO: 93.2 FL (ref 79–97)
MONOCYTES # BLD AUTO: 1.09 10*3/MM3 (ref 0.1–0.9)
MONOCYTES NFR BLD AUTO: 14.9 % (ref 5–12)
NEUTROPHILS NFR BLD AUTO: 4.72 10*3/MM3 (ref 1.7–7)
NEUTROPHILS NFR BLD AUTO: 64.3 % (ref 42.7–76)
PLATELET # BLD AUTO: 223 10*3/MM3 (ref 140–450)
PMV BLD AUTO: 10.3 FL (ref 6–12)
POTASSIUM SERPL-SCNC: 4.6 MMOL/L (ref 3.5–5.2)
PROT SERPL-MCNC: 6.8 G/DL (ref 6–8.5)
RBC # BLD AUTO: 4.11 10*6/MM3 (ref 3.77–5.28)
SODIUM SERPL-SCNC: 139 MMOL/L (ref 136–145)
WBC # BLD AUTO: 7.34 10*3/MM3 (ref 3.4–10.8)

## 2020-12-17 PROCEDURE — 99215 OFFICE O/P EST HI 40 MIN: CPT | Performed by: INTERNAL MEDICINE

## 2020-12-17 PROCEDURE — 25010000002 CYANOCOBALAMIN PER 1000 MCG: Performed by: INTERNAL MEDICINE

## 2020-12-17 PROCEDURE — 80053 COMPREHEN METABOLIC PANEL: CPT | Performed by: INTERNAL MEDICINE

## 2020-12-17 PROCEDURE — 85610 PROTHROMBIN TIME: CPT

## 2020-12-17 PROCEDURE — 36415 COLL VENOUS BLD VENIPUNCTURE: CPT

## 2020-12-17 PROCEDURE — 85025 COMPLETE CBC W/AUTO DIFF WBC: CPT

## 2020-12-17 PROCEDURE — 86304 IMMUNOASSAY TUMOR CA 125: CPT | Performed by: INTERNAL MEDICINE

## 2020-12-17 PROCEDURE — 83735 ASSAY OF MAGNESIUM: CPT | Performed by: INTERNAL MEDICINE

## 2020-12-17 PROCEDURE — 96372 THER/PROPH/DIAG INJ SC/IM: CPT

## 2020-12-17 RX ORDER — CYANOCOBALAMIN 1000 UG/ML
1000 INJECTION, SOLUTION INTRAMUSCULAR; SUBCUTANEOUS ONCE
Status: CANCELLED | OUTPATIENT
Start: 2020-12-17

## 2020-12-17 RX ORDER — SODIUM CHLORIDE 9 MG/ML
250 INJECTION, SOLUTION INTRAVENOUS ONCE
Status: CANCELLED | OUTPATIENT
Start: 2020-12-30

## 2020-12-17 RX ORDER — CYANOCOBALAMIN 1000 UG/ML
1000 INJECTION, SOLUTION INTRAMUSCULAR; SUBCUTANEOUS ONCE
Status: COMPLETED | OUTPATIENT
Start: 2020-12-17 | End: 2020-12-17

## 2020-12-17 RX ORDER — METHYLPREDNISOLONE 4 MG/1
TABLET ORAL TAKE AS DIRECTED
COMMUNITY
End: 2021-01-12

## 2020-12-17 RX ORDER — FOLIC ACID 1 MG/1
1 TABLET ORAL DAILY
Qty: 30 TABLET | Refills: 6 | Status: SHIPPED | OUTPATIENT
Start: 2020-12-17 | End: 2021-01-13 | Stop reason: SDUPTHER

## 2020-12-17 RX ADMIN — CYANOCOBALAMIN 1000 MCG: 1000 INJECTION INTRAMUSCULAR; SUBCUTANEOUS at 13:50

## 2020-12-17 NOTE — TELEPHONE ENCOUNTER
Patient called to reschedule her missed appt from 12/14 she is available any day and time    Best call back number [phone]

## 2020-12-17 NOTE — TELEPHONE ENCOUNTER
Call patient again phone go to voicemail left a message for her to call make appointment with dr grissom

## 2020-12-18 ENCOUNTER — TELEPHONE (OUTPATIENT)
Dept: ONCOLOGY | Facility: HOSPITAL | Age: 65
End: 2020-12-18

## 2020-12-18 ENCOUNTER — PATIENT MESSAGE (OUTPATIENT)
Dept: FAMILY MEDICINE CLINIC | Facility: CLINIC | Age: 65
End: 2020-12-18

## 2020-12-18 DIAGNOSIS — J40 BRONCHITIS: Primary | ICD-10-CM

## 2020-12-18 DIAGNOSIS — J40 BRONCHITIS: ICD-10-CM

## 2020-12-18 DIAGNOSIS — C56.1 MALIGNANT NEOPLASM OF RIGHT OVARY (HCC): Primary | ICD-10-CM

## 2020-12-18 RX ORDER — ONDANSETRON HYDROCHLORIDE 8 MG/1
8 TABLET, FILM COATED ORAL 3 TIMES DAILY PRN
Qty: 30 TABLET | Refills: 5 | Status: CANCELLED | OUTPATIENT
Start: 2020-12-18

## 2020-12-18 RX ORDER — FOLIC ACID 1 MG/1
1 TABLET ORAL DAILY
Qty: 30 TABLET | Refills: 6
Start: 2020-12-18 | End: 2021-01-01

## 2020-12-18 RX ORDER — DEXAMETHASONE 4 MG/1
TABLET ORAL
Qty: 6 TABLET | Refills: 5 | Status: SHIPPED | OUTPATIENT
Start: 2020-12-18 | End: 2021-02-01

## 2020-12-18 RX ORDER — FOLIC ACID 1 MG/1
1 TABLET ORAL DAILY
Qty: 30 TABLET | Refills: 6 | Status: CANCELLED | OUTPATIENT
Start: 2020-12-18

## 2020-12-18 RX ORDER — POTASSIUM CHLORIDE 1500 MG/1
TABLET, EXTENDED RELEASE ORAL
Qty: 150 TABLET | Refills: 5 | Status: ON HOLD | OUTPATIENT
Start: 2020-12-18 | End: 2021-02-09

## 2020-12-18 RX ORDER — ONDANSETRON HYDROCHLORIDE 8 MG/1
8 TABLET, FILM COATED ORAL 3 TIMES DAILY PRN
Qty: 30 TABLET | Refills: 5
Start: 2020-12-18 | End: 2021-01-13 | Stop reason: SDUPTHER

## 2020-12-18 RX ORDER — OXYBUTYNIN CHLORIDE 5 MG/1
TABLET ORAL
Qty: 120 ML | OUTPATIENT
Start: 2020-12-18

## 2020-12-18 RX ORDER — GUAIFENESIN AND CODEINE PHOSPHATE 100; 10 MG/5ML; MG/5ML
5 SOLUTION ORAL 3 TIMES DAILY PRN
Qty: 120 ML | Refills: 0 | Status: SHIPPED | OUTPATIENT
Start: 2020-12-18 | End: 2020-12-23 | Stop reason: SDUPTHER

## 2020-12-18 RX ORDER — FOLIC ACID 1 MG/1
1 TABLET ORAL DAILY
Qty: 30 TABLET | Refills: 6 | Status: CANCELLED
Start: 2020-12-18

## 2020-12-18 RX ORDER — ONDANSETRON HYDROCHLORIDE 8 MG/1
8 TABLET, FILM COATED ORAL 3 TIMES DAILY PRN
Qty: 30 TABLET | Refills: 5 | Status: CANCELLED
Start: 2020-12-18

## 2020-12-18 NOTE — PROGRESS NOTES
Hematology/Oncology Outpatient Follow Up    PATIENT NAME:Tahira Zimmerman  :1955  MRN: [id number]  PRIMARY CARE PHYSICIAN: Noemy Queen MD  REFERRING PHYSICIAN: Noemy Queen MD    Chief Complaint   Patient presents with   • Appointment     Pancytopenia due to antineoplastic chemotherapy       HISTORY OF PRESENT ILLNESS:     1. Recurrent ovarian cancer diagnosis established in 2013.  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  The patient was treated with surgery and then postoperatively with adjuvant chemotherapy consisting of carboplatin and Taxol.  Six months later, she had recurrence of disease intra-abdominally between the rectum and vagina, which was treated with intraabdominal peritoneal chemotherapy.  She had been free of disease since that time.  She reported feeling a mass in the left side of her abdomen approximately six months ago.  This gradually grew and in early 2013 she had her daughter feel the mass.  The daughter works for Dr. David Rogers and the patient at that time also complained of intermittent rectal bleeding.  Consultation with Dr. David Rogers was obtained.  The patient had a CT scan of the abdomen and pelvis performed on 13 revealing left lower quadrant mass measuring 9.7 x 9.3 x 6 cm demonstrating areas of dense calcification, soft tissue density and cystic density within it.  Stromal tumor is felt to be most likely a possibly fibroma.  It is generated with necrosis and calcification.  Possible palpable mass in the rectus muscle is seen with calcification and deformity of the rectus muscle and just below this a second mass intra-abdominally was seen measuring 2 cm in the greatest diameter suspicious for second intraabdominal tumor.  The mass separate from the largest mass measuring 2.6 cm in the dependent portion of pelvis is seen on the right of the  midline.  No retroperitoneal lymphadenopathy was seen.  No free air, free fluid or other abnormality was present.  The patient was scheduled for an outpatient colonoscopy, but had syncopal episode while sitting in the toilet the night before and was brought to the emergency room early in the morning by her daughter and son-in-law.  The patient had apparently stopped breathing for a few seconds and did not have a pulse, but started breathing before CPR could be initiated.  In the emergency room, the patient had an EKG revealing sinus rhythm nonspecific T-wave flattening; metabolic panel revealed a glucose of 148, creatinine of 1.3, CBC was normal.  She was treated with intravenous fluid.  The patient’s case was then discussed with Dr. Anabell Monique and patient was subsequently admitted to [facility].  The patient underwent a colonoscopy by Dr. David Rogers on 06/27/13 revealing sigmoid diverticulosis and grade II internal and external hemorrhoids, but no mass or colitis was seen.  CT-guided biopsy of the mass was performed on 06/27/13 as well revealing metastatic papillary adenocarcinoma with numerous psammoma bodies.  Pathology comment stated prior records were not available; however, with history of ovarian cancer the current biopsy would be consistent with metastases from that malignancy.  Oncology consult was obtained and I initially saw the patient on 06/27/2013 where the patient had claimed to have little bit of pain in the area of disease.  She reported being frequently constipated, denies any weight loss or fevers.  She did report having some night sweats, but thought that was because of her menopause.  On 06/27/13 metastatic workup including labs and CT scans were initiated.  CT scan of the chest on 06/27/13 revealed no acute disease in the chest.  A 1.4 cm size focal area of decreased density was noted in the lateral aspect of the right lobe of the liver consistent with a benign cyst or  hemangioma.  There was a very small hiatal hernia measuring 2.2 cm in diameter.  A CT scan of the head performed 06/27/13 revealed no acute intracranial abnormality noted.  CA-125 was 40 units/ml (0-35), CA 19-9 was 11.8 units/ml (0-35), CEA level was 0.8 ng/ml (0-3), TSH level was 1.09 IU/ml (0.34-5.6).  The patient was in workup for the syncopal episode, a carotid Doppler was performed on June 28 revealing an essentially normal study showing a reduction in diameter from 0-15% bilaterally.  A Holter monitor was completed on 06/27/13, which was unremarkable except for a few premature atrial contractions.  The patient was felt stable and subsequently was discharged home on 06/28/13.  Post discharge, the patient was seen at [facility] on 07/05/13 by Dr. Kathryn Thrasher who discussed treatment options with the patient including surgery.  The patient is in the office today 07/16/13 in follow up post hospitalization.  She is reporting that surgery is planned for July 30th of this month at .  · 7/30/13 to 8/3/13 - The patient was in [facility].  The patient was admitted for surgery for her recurrent ovarian cancer.  The patient had exploratory laparotomy, omentectomy, bowel resection, liver biopsy and appendectomy.  Pathology revealed of the omentum metastatic serous carcinoma of the transverse colon, segmental resection showed metastatic serous carcinoma involving mesenteric fat.  The liver wedge resection showed fibrosclerotic thickening of the hepatic capsule.  Benign liver parenchyma.  No evidence of metastatic tumor.  Appendix showed fibrous obliteration of the lumen.  Negative for metastatic carcinoma.  Rectum and sigmoid colon segmental resection showed metastatic serous carcinoma invading the colorectal mucosa uninvolved by tumor.  Vaginal mass showed metastatic serous carcinoma.  Margins are positive for tumor.  · 9/24/13 - Chemotherapy orders were written for  patient to start carboplatin 550 mg IV day one and Taxol 330 mg IV day one to be cycled every three weeks.  · 10/10/13 - The patient started cycle #1 of chemotherapy consisting of Carboplatin and Taxol.  · 09/25/13 -  is 10.6 normal.  · 10/01/13 - CT of the chest, abdomen and pelvis revealed new reticular nodular occupancy in the anterior segment of the right upper lobe, could reflect an inflammation or infectious etiology or could reflect unusual persistence of metastatic tumor.  New liver lesions, the smaller one appears to be implant on the surface of the liver, the larger may also represent an implant including the intrahepatic.  Several small mesenteric nodes seen throughout the abdomen and pelvis.  · 10/02/13 - Port placed by Dr. Sen.  · 10/24/13 - Orders written to start Neupogen daily and if more than three Neupogen are needed to give Neulasta after next chemo.  ANC is 0.15.  · 10/31/14 - Patient given cycle 2 of chemotherapy with Taxol and Carboplatin.  · 11/21/13 - The patient started cycle 3 of chemotherapy consisting of Carboplatin and Taxol.  · 11/26/13 - CT scan of chest, abdomen and pelvis revealed no evidence of active disease in the chest.  Reticulonodular opacity has resolved.  Metastatic lesion impressing upon the hepatic dome similar to prior exam.  No evidence of a change.  No evidence of new disease.  Postsurgical changes in the pelvis and throughout the retroperitoneum in the large bowel.  Finding containing anterior abdominal wall hernia containing fat tissue and enhancing vessels, 3 cm in diameter.  · 12/2/13 - Orders written to start Neupogen per protocol as well as Aranesp due to low hemoglobin and ANC.  ANC is 0.14.  Hemoglobin is 9.7.  · 12/11/13 - Orders written to hold chemotherapy until next week and decrease dose by 20% due to low platelet count.  Platelet count is 85,000.  · 12/16/13 - The patient received cycle 4 of Carboplatin and Taxol at a 20% dose reduction so Taxol dose  is 260 mg and Carboplatin is 440 mg.  · 12/16/13 - CA-125 12.6 (N).  · 12/19/13 - PET/CT scan.  Impression:  1. There is no evidence of hypermetabolism in the liver.  Specifically of the dominant lesion in or adjacent to the right hepatic dome that was seen and described on the recent CT study of 11/26/2013.  It is photopenic relative to the surrounding liver.  2.  There is no evidence of hypermetabolic soft issue mass lesion or free fluid in the abdomen or pelvis.  3.  The tonsillar tissues are slightly enlarged and have moderate activity level.  This maybe physiologic.  Correlation with oral cavity, examination is recommended.  4.  Mild amount of activity in the right level II jugular lymph chain without focally enlarged lymph nodes is of questionable significance.  · 1/6/13 - The patient received cycle 5 of chemotherapy with Taxol and Carboplatin.  · 1/27/14 - The patient started on cycle 6 of Taxol and Carboplatin.  · 2/18/14 - CT of the abdomen and pelvis with contrast; stable lesions impressing upon the hepatic dome, unchanged compared to the prior exam.  Multiple anterior abdominal wall hernias re-demonstrated.  Those above the umbilicus contain omental fat.  Below and to the left of the umbilicus as anterior abdominal hernia containing omental fat in nondilated small bowel which is larger than seen previously.  · 2/20/14 - The patient received cycle 7 of chemotherapy with Taxol and Carboplatin.   · 3/11/14 - Order written to discontinue chemotherapy due to patient has completed 7 cycles of chemotherapy and CT scans suggest possible remission.  · 3/11/14 -  11.0 (N).  · 06/05/14 - CT scan of the chest abdomen and pelvis with contrast: There are multiple anterior abdominal wall hernias.  There are 2 larger anterior abdominal wall hernias at the level of the transverse colon, which contain omental fat.  There are at least 2 small anterior abdominal wall hernias that contain omental fat.  There is a  moderate sized anterior abdominal wall hernia at the level of the umbilicus, eccentric to the left, which contain non-dilated bowel.  Interval decrease in the size of two deposit impressing on the liver.  The third tiny deposit has been stable.  · 8/19/14 -  10.4 (N).  · 12/19/14 -   10.0 (N)  · 1/7/15 - CT chest, abdomen and pelvis with stable appearance of the chest with several micronodules. Small hiatal hernia, slightly larger than previous exam, increased from 1.5 to 2.5 cm in diameter. Bochdalek hernia unchanged. Multiple hepatic lesions. The two dominant lesions at the right hepatic dome had decreased in size from previous. The remaining tiny nodules measured 3-5 mm in diameter and were unchanged. There was no evidence of new intra-abdominal or pelvic mass or free fluid. There were multiple anterior abdominal wall hernias which had increased in size.   · 7/27/15 - Comprehensive metabolic panel with no significant abnormalities. CA-125 of 21 (0-35).   · 10/26/15 - WBC 6, hemoglobin 13, platelet count 204,000. Heart rate 47. CA-125 of 20 (0-35).    · 2/18/16 - CT chest with contrast with stable micronodular density seen in the lungs without significant change from prior exam. CT abdomen and pelvis with regression of liver lesions with no new evidence of metastasis. Multiple ventral hernias again noted without significant change from prior exam. Creatinine 0.9 (0.6-1.3).    · 2/25/16 - Patient hospitalized at [facility] between 2/25/16 and 2/27/16 with pyelonephritis secondary to E-coli. She was given two days of IV Rocephin and then placed on oral Levaquin. She was asked to hold her Coumadin, but then had nausea and vomiting because of Levaquin and stopped the Levaquin also. Her CA-125 was 32 (0-35) and CEA 1.3 (0-5). Her urine grew >100,000 E-coli. CT of the abdomen and pelvis was done which revealed 4.1 x 1.8 cm sized mild ovoid area of decreased density in the liver parenchyma along  the anteromedial aspect of the dome of the right lobe of the liver, unchanged significantly since the previous study of 2/18/16. There was suspicion of a very small pericardial effusion. There was suspicion of a small hiatal hernia. There were several hernias in the anterior abdominal wall. A 3.5 x 3.1 cm incised well-circumscribed abnormal density in the left retroperitoneal fatty soft tissue at the level of the left iliac crest was suggestive of tumor recurrence. There was an abnormal density in the left retroperitoneal soft tissues in the left flank that partially surrounded the left kidney and extended downward to the left pelvic area. Diverticulosis of the distal descending colon and sigmoid colon were seen.     · 3/8/16 - Patient prescribed Bactrim DS 1 p.o. b.i.d. for 10 days for pyelonephritis, which was partially treated with Rocephin and Levaquin. Urine with 40,000 E-coli treated with Macrobid for 7 days.  of 20 (0-35).    · 3/31/16 - PET scan with area of low density anteriorly in the right lobe of the liver with no significant radiopharmaceutical uptake to suggest malignancy. Multiple round soft tissue density masses showing increased radiopharmaceutical activity and multiple anterior abdominal wall hernias.   · 5/10/16 - Patient complains of venous enlargement of the left chest wall around the port. Has not been seen by  yet, but has an appointment on 5/23/16. Complained of a cough.   · 5/12/16 - Infusaport contrast study with no evidence of fibrin sheath. Chest x-ray with mild cardiac prominence.   · 5/23/16 - Patient seen in consultation by Sheng Bowman M.D. at [facility] and a chemotherapy trial recommended.   · 6/1/16 -   of 27.1 (0-35).    · 6/14/16 - WBC 5.71, hemoglobin 12.4, platelet count 188,000. Patient is scheduled for surgery at  on 6/16/16 after further discussion with  physicians. Discussed adjuvant chemotherapy.   · 6/16/16 - Excisional biopsy of abdominal  wall mass performed by Sheng Bowman M.D. at [facility] with pathology revealing metastatic high-grade papillary serous carcinoma.   · 7/7/16 - Received a call from the patient that Tramadol was not working and that she was given Norco #24 after her biopsy at  which did help her pain. Patient prescribed Norco 5/325 one p.o. q. 4-6 hours p.r.n. #60 with no refills. Echocardiogram with left ventricular inferior wall hypokinesis with ejection fraction of 50-55%.   · 7/8/16 - Patient seen in consultation by Sheng Bowman M.D. in consultation at  for metastatic high-grade papillary serous carcinoma diagnosed by excisional biopsy on 6/16/16 with carcinomatosis and patient not a surgical resection candidate. Genetic sequencing and susceptibility testing of tumor was ordered. Biopsy was done of anterior abdominal wall.   · 7/7/16 - Echocardiogram with left atrial enlargement. Mild mitral regurgitation. Left ventricular proximal inferior wall hypokinesis with ejection fraction of 50-55%.   · 7/11/16 - While waiting for genomics results, in order not to delay treatment further, order written for Taxotere 60 mg/M2 IV over one hour followed by Carboplatin AUC 5 over one hour, cycles to be repeated every three weeks.  Comprehensive metabolic panel normal.  26 (0-35).    · 725/16 - Pain contract signed for use of Norco.   · 8/5/16 - Patient stated that she experienced a red rash and was itchy post taking Norco. Norco discontinued and Tramadol refilled.   · 8/16/16 - Patient started cycle 1 chemotherapy. WBC 7.1, hemoglobin 11.2, MCV 88.7, platelet count 94,000. Patient complained of nausea for a week post chemo. Already getting Emend, Aloxi and Decadron. Added Omeprazole 40 mg daily orally.   · 8/17/16 - Urine culture with >100,000 E-coli.   · 8/25/16 - Cycle 2 chemotherapy given.   · 9/9/16 - Magnesium 1.3, replaced intravenously. Potassium 3.4 (3.6-5.1), increased oral potassium supplementation.     · 9/16/16 - Cycle 3 chemotherapy given.   · 9/19/16 - Comprehensive metabolic panel with no significant abnormality.   · 9/20/16 - CT abdomen and pelvis with evidence of progressive metastatic disease in the abdomen and pelvis with interval enlargement of several soft tissue attenuations and subcutaneous nodules in the anterior abdominal wall. Interval enlargement of a left pelvic soft tissue attenuation mass. A lentiform area of low attenuation in the dome of the liver stable in size. Multiple anterior abdominal wall hernias containing fat and/or bowel. Marked pelvic floor laxity. CT chest negative for evidence of metastatic disease. Tiny pulmonary nodules in the right lung stable since 2013 consistent with a benign etiology.   · 9/23/16 - Macrobid 100 mg p.o. b.i.d. x7 days prescribed for UTI. Current chemotherapy discontinued after discussion and new chemotherapy orders written. Carboplatin AUC-5 IV day 1 and Doxil (Liposomal Doxorubicin) 30 mg/M2 IV day 1 to cycle q. 21 days.  of 18 (0-35). Magnesium 1.6, replaced intravenously. Comprehensive metabolic panel with potassium 3.4 (3.6-5.1).     · 10/13/16 - Patient started on cycle 1 of Carboplatin and Liposomal Doxorubicin followed by Neulasta.   · 11/3/16 - Cycle 2 Carbo and Doxil given.   · 11/17/16 - Magnesium 1.0, replaced intravenously. Oral potassium supplement increased.   · 11/29/16 - CT chest with small non-calcified right pulmonary nodules unchanged. Benign bone island in the midthoracic vertebral body along with benign bony hemangiomas in the middle thoracic vertebral bodies. CT abdomen and pelvis with mild progressive metastatic disease from CT of September 2016. Anterior abdominal wall mass superiorly mildly increased in size with new area noted as described measuring 3 x 1.7 cm. 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 Stable area of decreased uptake in the dome of the liver not hypermetabolic  on recent PET scan, likely representing cyst or focal fatty infiltration. Stable small hiatal hernia, fat-containing Bochdalek’s hernia and anterior abdominal wall hernias with stable pelvic floor relaxation.    · 12/1/16 - Cycle 3 chemotherapy given.   · 12/8/16 - Current chemotherapy discontinued and patient started on Gemcitabine 1000 mg/M2 IV days 1, 8, 15 q. 28 days.   · 12/22/16 - Patient seen in followup by Juliana Roy M.D. at the pain clinic, treated with Oxycodone and Lyrica. Transaminases normal. Patient started cycle 1 chemotherapy.   · 12/29/16 - Magnesium 1.1 (1.8-2.5), replaced intravenously.   · 1/5/17 - Cycle 1 day 15 chemotherapy held as patient leaving for vacation to [place]. Chemotherapy dose decreased by 20%. Patient complains of diarrhea for which Imodium prescribed.   · 1/11/17 - BRCA-1 and BRCA-2 negative.   · 1/12/17 - Echocardiogram with ejection fraction 60% or greater.   · 1/19/17 - Comprehensive metabolic panel with no significant abnormality. Patient started cycle 2 chemotherapy.   · 2/2/17 - Urine with >100,000 E-coli treated with Cipro 500 mg p.o. b.i.d. x1 week.   · 2/6/17 - Magnesium 1.1 (1.8-2.5), replaced intravenously.    · 2/23/17 - Patient started cycle 3 chemotherapy.   · 2/27/17 - CT chest with interval development of too numerous to count very small pulmonary nodules, ill-defined ground glass opacities concerning for development of pulmonary metastatic disease. Stable left-sided chest port. CT abdomen and pelvis with stable appearance of multiple small soft tissue densities within the abdomen near midline subcutaneous fat of the abdominal wall. Interval decrease in size of largely calcified left pelvic mass and multiple fat in bowel containing small ventral hernias.   · 3/6/17 - Pulmonary consultation obtained for lung nodules. Discussed CT results with patient. Decision made to continue present chemotherapy until further lung evaluation as abdominal disease  better.  of 18 (0-35).    · 3/16/17 - Patient started cycle 4 chemotherapy, but treatment held from day 8 onwards because of hospitalization.   · 3/19/17 - Patient was hospitalized at [facility] between 3/19/17 and 3/25/17 with chest pain. Chest x-ray had revealed increased mild bibasilar airspace disease. Troponin I and EKG’s were negative. INR was elevated. Patient was treated with antibiotics. EGD was performed revealing small hiatal hernia and esophageal dilatation was performed. Bronchoscopy was performed also with no intrabronchial lesions identified. IgA was 51 (), IgG 320 (600-1500) and IgM 19 (). Lexiscan nuclear stress test had no evidence of reversible myocardial ischemia with normal left ventricular ejection fraction of 58%. CT chest had development of patchy bilateral ground glass opacities most pronounced involving the left upper lobe and bilateral lower lobes. Multiple tiny bilateral pulmonary nodules were less conspicuous. The patient underwent a bronchoscopy by Jerica Esparza M.D. with right upper lobe bronchoalveolar lavage revealing benign squamous cells and bronchial cells with pulmonary macrophages present. There was mild acute inflammation. Negative for malignant cells. Prilosec was changed to Famotidine for hypomagnesemia.    · 4/6/17 - Magnesium 1.2 (1.8-2.5). Comprehensive metabolic panel with no significant abnormality. Patient seen in follow-up by Fito Ram M.D. at [facility]. Treated with Lyrica and Oxycodone.    · 5/4/17 - Patient complains of a rash itching on her right upper arm. Eczematous rash noted and patient prescribed Cyclocort 0.1% b.i.d. Magnesium infusions continued with each chemo. Order written to resume chemotherapy.   · 5/16/17 - Cycle 5 chemotherapy started.   · 5/26/17 - Magnesium 1.4 (1.8-2.5), replaced intravenously. Potassium 2.9 (3.6-5.1), KCL increased to 20 mEq three in the morning and three in the evenings.   · 5/30/17 - PET scan with  remaining metabolic activity within the nodular areas. The suggested mass which is partially calcified and necrotic within the left aspect of the pelvis seems slightly larger with increased metabolic activity. There was more calcification in these areas compared to prior study, suggesting inflammation.      · 6/8/17 - Patient complaining of shortness of breath. Does take HCTZ at home. Chest x-ray ordered and chemotherapy continued along with weekly magnesium replacement. Chest x-ray with no acute cardiopulmonary abnormalities.   · 6/13/17 - Patient received cycle 6 of chemotherapy.   · 7/31/17 - WBC 5.0, hemoglobin 11.2, MCV 89.7, platelet count 217,000. Patient is to resume chemotherapy starting with cycle 7 on Wednesday of this week.  of 19 (<35). Potassium 3.3 (3.5-5.3).     · 8/2/17 - Patient began cycle 7 of chemotherapy.   · 8/30/17 - Patient started cycle 8 chemotherapy.   · 9/5/17 - Patient seen in followup at Pain Management by Fito Ram M.D. and continued on treatment with Oxycodone and Lyrica.   · 9/13/17 - Patient was hospitalized at [facility] for a day with dizziness secondary to dehydration, hypokalemia and anemia. She was given 1 unit of packed red blood cells and electrolytes were replaced. Chest x-ray revealed a subtle opacity in the left lateral lung base favored to represent atelectasis. Magnesium 1.3 (1.5-2.5), patient continued on replacement.    · 9/22/17 - Chemotherapy and magnesium replacement continued with decrease in chemotherapy dose by 10% secondary to cytopenias.   · 9/25/17 - Cycle 9 chemotherapy started.   · 10/12/17 - CT of the chest with contrast showed several tiny pulmonary nodules. One within the right lower lobe appears new from prior exams. Two adjacent foci of nodularity within the medial right lower lobe suggestive of minor infectious or inflammatory process. CT of the abdomen and pelvis with contrast which showed soft tissue nodules along the anterior abdominal  and pelvic walls unchanged from previous scan. Also a partially calcified mass within the left pelvis unchanged. No acute process identified within the abdomen or pelvis. Several left paracentral ventral hernias unchanged. No evidence of acute bowel obstruction.   · 10/16/17 - Order written for Levaquin 500 mg p.o. daily as the patient states that she has had a productive cough, fever and chills and with the results of the CT scan showing a possible infectious versus inflammatory process. Order also written to continue chemotherapy and magnesium replacement as previously prescribed.   · 10/23/17 - Cycle 10 chemotherapy started.   · 11/19/17 - Patient went to the Emergency Room at [facility] complaining of right lower extremity redness and fever for a day. Venous Doppler had no evidence of a DVT and patient was discharged home on Clindamycin.   · 11/21/17 -  of 17 (0-35).   · 12/4/17 - Cycle 11 chemotherapy started.   · 12/20/17 - PET scan with multiple hypermetabolic soft tissue nodules abutting the anterior abdominal wall, stable from previous examination. Peripherally calcified left lower quadrant mass near the ascending colon stable in size demonstrating no hypermetabolic uptake. Previously had maximum SUV of 7. Right medial basilar pulmonary nodules resolved.   · 12/22/17 - CT left lower extremity with soft tissue swelling with skin thickening present along the anterior and medial aspect of the leg, slightly more pronounced around the palpable marker seen within the upper medial aspect of the lower extremity.   · 12/26/17 - Elastic stockings prescribed for lower extremity edema.   · 1/8/18 - Patient hospitalized at [facility] between 1/8/18 and 1/11/18 with fever of up to 101 degrees and painful rash on the lower extremities of several weeks’ duration. She was found to be hypokalemic and MRI of the lower extremities revealed thickening and swelling. Chest x-ray had no acute cardiopulmonary abnormality. Skin biopsy was  performed by Surgery revealing stasis dermatitis and no evidence of malignancy. Infectious Disease consultation was obtained and IV antibiotics were stopped. Blood cultures had no growth.   · 1/22/18 - Patient asked to start wearing elastic stockings which she has not started yet. She was given a prescription for Dyazide 1 p.o. daily.   · 2/26/18 - Ordered chemo to resume again. Patient unaware that she was supposed to resume chemo after her last visit in January. Continue magnesium infusions on day 1, 8 and 15. Prescribed Levaquin 500 mg p.o. daily x10 days for productive cough x1 week. History of viral illness consistent with influenza.  of 18 (0-35). Chest x-ray with no acute process.    · 3/5/18 - Cycle 12 chemotherapy started.   · 3/26/18 - Options discussed with patient. Decision made to discontinue IV Gemzar and orders written to start on Niraparib (Zejula) 300 mg p.o. daily as maintenance therapy.   · 4/11/18 - Niraparib discontinued due to insurance denial. Orders written to start Carboplatin-AUC 5 IV day 1 cycling every 21 days. Orders written for Neulasta 6 mg subcutaneous kit day 1 with palliative treatment intent and expected duration of treatment three months.   · 4/12/18 - CT chest, abdomen and pelvis with contrast revealed numerous tiny centrilobular nodules in the lungs favored to reflect infectious or inflammatory process. Nodules ranged 1-3 mm in size and are new from prior studies with metastatic disease not entirely excluded. Stable small hiatal hernia. Interval progression of metastatic disease involving the subcutaneous tissues at the ventral abdominal wall. Multiple soft tissue density nodules previously shown to be hypermetabolic on PET scan. New small crescent of low attenuation material along the periphery of the spleen with similar finding at the dome of the liver. Findings are concerning for peritoneal metastases. Density of the dome of the liver remained unchanged from multiple  prior studies. Stable calcified mass in the left pelvic sidewall. Urinary bladder wall thickening.   · 4/23/18 - Cycle 1 chemotherapy with Carboplatin administered along with Neulasta injection.   · 4/26/18 - Neulasta discontinued due to insurance denial.   · 5/14/18 - Patient received cycle 2 Carboplatin.   · 6/5/18 - Cycle 3 day 1 chemotherapy with Carboplatin administered.   · 6/20/18 - CT chest, abdomen and pelvis at [facility] showed re-demonstration of soft tissue mass in the ventral wall hernia left of the midline as well as the dome of the liver, likely representing metastatic disease similar as compared to the prior study. Additional calcified lesions present in the upper left hemipelvis and along the anterior abdominal wall to the right of the midline also likely representing metastatic disease. No definite new lesions were identified. Moderate to large stool burden likely related to mild constipation was present. No suspicious lung nodules were identified. The previously-described ground glass nodules appeared to have resolved. There was a stable subpleural nodule in the right upper lobe measuring 3 mm present since 2014 without significant changes.   · 6/26/18 - Cycle 4 day 1 Carboplatin administered with addition of Neulasta for febrile neutropenia prophylaxis.   · 6/29/18 - Reviewed CT scans with patient. Orders written to discontinue Carboplatin and Neulasta and plan to start Niraparib 300 mg by mouth daily as maintenance therapy. Prescribed Amlodipine 2.5 mg p.o. daily for chemo-induced hypertension.   · 7/18/18 - Orders written to increase weekly Magnesium Sulfate to 3 g IV weekly due to continued hypomagnesemia.   · 8/1/18 - Patient reports she has not received Niraparib (Zejula) to date.   · 8/30/18 - Patient’s phone was not working so though Niraparib approved, the drug company had not been able to get ahold of her. Patient supplied with drug company number so that she can start taking  the pills.   · 9/6/18 -  of 20 (N).   · 9/18/18 - Urinalysis done for dysuria and back pain. Urine culture grew 20,000 to 50,000 colonies of urogenital ruy. Prescribed Bactrim DS one tablet twice daily for one week.   · September 2018 - Patient initiated on Zejula 300 mg p.o. daily.   · 10/1/18 - WBC 3.5, hemoglobin 12, platelet count 74,000. Niraparib (Zejula) dose held and then resumed when platelets above 100,000 at a dose of 200 mg daily.   · 10/15/18 - Patient received Niraparib (Zejula) at 200 mg p.o. daily with a platelet count of 198,000.   · 11/7/18 - WBC 6, hemoglobin 11.6, MCV 96.2, platelet count 137,000. Patient claims to have stopped taking Niraparib a few days prior because of cough. Asked to resume taking it. Ciprofloxacin 500 mg p.o. twice daily for one week for bronchitis.   · 12/3/18 - Magnesium Sulfate infusions changed to every two weeks, to send mag level before and give 3 grams. DC’d Magnesium Oxide and started Magnesium Plus Protein two pills twice daily.   · 1/4/19 - CT chest, abdomen and pelvis with new patchy nodular areas of airspace consolidation within the bilateral upper lobes, left greater than right, favored to be infectious or inflammatory. Interval enlargement of the peritoneal soft tissue masses within the pelvis compatible with progression of disease. Enlarging ventral hernia now containing multiple loops of small bowel and colon.   · 1/7/19 - Patient has not been coming in for weekly magnesium replacement as nursing has not been able to get ahold of her. Results of CT’s discussed with patient. Decision made to change her to IV chemotherapy with Carboplatin AUC-5 day 1 and pegylated liposomal Doxorubicin (Doxil) 30 mg/M2 IV day 1 to cycle every four weeks. Patient made aware of the limited choices of her treatment available.   · 1/31/19 - Echocardiogram showed LVEF 50-55% and otherwise normal echo Doppler study.   · 2/4/19 - New chemotherapy not initiated to date with  difficulty contacting patient for echocardiogram. Neulasta On-Pro 6 mg ordered with chemotherapy due to extensive prior exposure to chemotherapy, increasing risk of febrile neutropenia, history of neutropenic events on prior chemotherapy regimens.   · 2/15/19 - Patient started cycle 1 chemotherapy with Neulasta support.   · 3/6/19 -  of 28 (0-35).   · 3/15/19 - Cycle 2 Carboplatin with Liposomal Doxorubicin (Doxil) and Neulasta support initiated.     · 4/10/19 - Patient was requested to followup with Dr. Queen regarding hypotension and blood pressure medication dosing. Patient appears to be tolerating treatment well with cytopenias not requiring dose adjustments. No Doxil-related skin or mucus membrane toxicities or other significant toxicities to date.   · 4/12/19 - Cycle 3 chemotherapy given.   · 4/16/19 - CT chest, abdomen and pelvis with no evidence of active metastasis to the chest. Several tree-in-bud nodules within the periphery of the inferior right upper lobe likely infectious or inflammatory. Disease burden within abdomen and pelvis stable to slightly increased from prior CT. Specifically, a soft tissue mass along the right rectus muscle slightly increased in bulk. Multiple ventral hernias, some containing loops of bowel, with no evidence of acute bowel obstruction.   · 5/8/19 - Discussed CT results with patient. Overall stable disease and would like to continue same treatment. Dove soap and Hydrocortisone Cream p.r.n. prescribed for scattered rash on back.   · 5/10/19 - Cycle 4 Carboplatin and Liposomal Doxorubicin initiated.   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 It was ER positive, HER2/zuleima negative, PD-L1 negative. There was TOPO1 positivity and TUBB3 positivity. TMB was low (two mutations per megabyte MUTS/MB) and MSI was stable. There were 13 therapies considered with increased benefit. The 13 therapies with increased  benefit included Fulvestrant, Irinotecan, Letrozole, Topotecan, Anastrazole, Exemestane, Tamoxifen, all of which were referenced to [facility] as well as Abemaciclib, Everolimus, Gefitinib, Palbociclib, Ribociclib and Toremifene.     · 6/5/19 echocardiogram with preserved LV systolic function with EF around 60%.  · 6/7/19 cycle 5 chemotherapy with Neulasta support given.  Patient continued on mag oxide 400 mg p.o. twice daily and weekly IV 3 g mag sulfate infusions for persistent hypomagnesemia.  · 7/5/2019- cycle 6 chemotherapy with carboplatin and doxorubicin with Neulasta port initiated.  Ca125:  21 (0-35).  · 7/23/2019-CT chest abdomen and pelvis compared to CT from 4/16/2019 revealed multiple small pulmonary nodules unchanged from prior examination within the right upper and left lower lobes.  Complex ventral hernia similar to prior exam.  Soft tissue nodule along the right lateral margin of the right of the midline measuring 12 x 10 mm unchanged in size.  Irregular soft tissue nodular thickening along the margin of the most inferior aspect of the complex ventral hernia with thickness measuring up to 13 mm similar to prior examination.  Extensive calcified and soft tissue mass within the left lower quadrant decrease in size now measuring 2.6 x 2.3 cm previously 3.0 x 2.5 cm.  Partially calcified mass involving the right rectus sheath measuring 3.1 x 2.7 cm previously measured 3.3 x 2.9 cm.  Densely calcified mass measuring 2.1 x 1.6 cm unchanged previously measured 2 x 1.7 cm.  Right external iliac chain lymph node measuring 9 mm previously measured 5 mm.  · 8/2/2019 cycle 7 chemotherapy given.  · 8/30/2019-cycle 8 chemotherapy with carboplatin and doxorubicin with Neulasta support given.  · 9/27/2019 cycle 9 chemotherapy given.  · 10/1/19 - Echocardiogram showed Normal LV size and contractility EF of 60-65%. Normal RV size. Borderline left atrial enlargement. Aortic valve, mitral valve, tricuspid valve appears  structurally normal, no significant regurgitation seen.No pericardial effusion seen. Proximal aorta appears normal in size.  · 10/18/19 - Magnesium 1.5 (1.8-2.5).  IV magnesium replacement continued.  · 10/25/2019 cycle 10 chemotherapy given.  · 10/29/2019 patient had CT scan of the chest abdomen and pelvis-there is a new 2 mm nodule in the left lower lobe with a stable 2 mm nodule in the left lower lobe.  Follow-up in 6 months was recommended.  Stable multiple soft tissue density and calcified nodules within the ventral abdominal wall and left retroperitoneal fat consistent with nonviable metastatic disease.  These nodules have either decreased in size or stable.  · 11/22/2019 10 received cycle 11 of chemotherapy with Doxil and carboplatinum with Neulasta  · 12/8/2019 to 12/11/2019 patient was admitted to the hospital for neutropenic fever.  · 12/20/2019 patient received cycle 12 of chemotherapy with Doxil and carboplatinum with Neulasta  · 1/13/20: 2 D echo was normal with EF 56% to 60%  · 1/24/20-Patient received cycle 13 of combination chemotherapy with carboplatinum and Doxil  · 2/3/2020 patient had a  which was 25.4  · 2/21/2020-patient received cycle 14 of chemotherapy with carboplatinum and Doxil  · 3/6/2020 patient had CT scan of the chest, abdomen and pelvis this revealed a 3 mm nodule in the left lower lobe present on prior CT of the abdomen unchanged from July 2019.  Stable 3 mm right upper lobe nodule.  There is a lymph node in the AP window measuring 8 x 9 mm prominent left hilar lymph node measuring 12 mm previously was 7 x 7 mm no significant adenopathy was seen in the abdomen there is an area of irregular soft tissue in the left paramidline ventral hernia which is stable measuring 5.7 cm partially calcified mass in the right rectus measuring 3 x 2.5 cm which is also stable the prominent lymph nodes in the left mid hilum and mediastinum may be reactive or metastatic disease  · 4/17/2020-patient  had cycle 15 of single agent carboplatinum  · 5/26/2020 patient had CT scan of the chest, abdomen and pelvis showed 3 new lung nodules measuring 7 mm in the left upper lobe, 5 mm in the left lower lobe and 4 mm in the right lower lobe.  There were additional small lung nodules which were unchanged.  Mildly prominent mediastinal and bilateral hilar lymph nodes mildly increased in size.  Right hilar node 9 mm previously was 5 mm.  Carinal node 9 mm previously was 6 mm.  The nodule at the right side of the hernia sac has increased to 1.5 cm from 1.2 cm.  Also a nodule in the subcutaneous fat currently measures 2.4 was previously 2 cm.  · 5/15/2020-patient received cycle 16 of carboplatinum  · Since the last visit in the office patient patient developed dizzy spell and fell. For that reason she was taken to the emergency room at [place].    · 7/3/2020 she had brain MRI which showed an 8 mm focus of abnormality in the left aspect of the posterior fossa corresponding to a dural based enhancing lesion thought to represent a calcified meningioma vessels calcified dural based metastasis.  This lesion has a slight local mass-effect and a small amount of surrounding edema.  There is also a 4 to 5 mm rounded focus of abnormal nodular enhancement along the cortex of the left parietal lobe but no significant mass-effect.  This is nonspecific could represent neoplastic process, infectious/inflammatory process or granulomatous disease.  · Patient was seen by neurosurgery, follow-up brain MRI in 8 weeks has been recommended by Dr. Hartman  · 7/9/2020 patient was initiated on single agent topotecan cycle 1 day 1  · 7/16/2020 she received the 8 topotecan  · 8/6/2020 patient received cycle 1 day 15 of topotecan  · 9/2/2020 patient had brain MRI multiple hospital which showed interval increase in size of the metastasis in the left parietal lobe now measuring 9 x 7 mm.  Previously was 5 mm.  There has been interval increase in size of the  left superior cerebellar metastases now measuring 1.3 cm previously was 8 mm.  There was no new metastatic disease identified.  · 9/11/2020 patient received cycle 2-day 15 of single agent topotecan  · Was admitted to the hospital 10/1/2020 for supratherapeutic INR  · 10/13/2020 patient was seen by Dr. Delong she has started stereotactic brain radiation to be completed on 10/28/2020  · 11/6/2020 patient received cycle 3-day 1 of chemotherapy with topotecan  · 11/13/2020 patient received cycle 3-day 8 of topotecan  · 11/30/2020 patient had CT scan of the chest, abdomen and pelvis this showed new reticular nodular interstitial thickening within the right lung mostly in the right middle lobe and right lower lobe worrisome for lymphangitic spread of cancer.  Evidence of progression of metastatic disease in the chest, abdomen and pelvis the new tiny sclerotic foci within the spine worrisome for osseous metastatic disease.  · 12/17/2020 - Discontinued topotecan due to progressive disease with orders written to begin Alimta 500 mg per metered squared IV q. 21 days  · 12/30/2020 - Start cycle 1 day 1 Alimta      2. Deep vein thrombosis of left upper extremity diagnosis established in October of 2013.  · 10/16/13 - Venous Doppler of left upper extremity.  Impression:  Deep vein thrombosis involving the subclavian, axillary and brachial veins on the left side, superficial vein thrombosis involving the left basilic vein.  · 10/16/13 - Order written for Lovenox 120 mg subcutaneously daily until INR is in therapeutic range.  Order written for Coumadin 5 mg p.o. daily.  The patient is to follow PT and INR protocol.  · 5/20/16 - Venous Doppler bilateral lower extremities normal.  · 7/30/19 - Patient continued on Coumadin following the INR protocol.      3. Anemia diagnosis established in November 2013 and pernicious anemia diagnosis established March 2016.   · 11/15/13 - Hemoglobin is 7.8.  · 12/2/13 - Orders written to start Aranesp  200 mg subcutaneous every two weeks due to low hemoglobin secondary to chemo induced anemia.  Hemoglobin is 9.7.  Anemia workup is ordered.  · 12/03/13 - Ferritin 179.8 (N), folic acid 8.3 (N), vitamin B12 314, iron 104 (N), TIBC 298 (N), iron saturation 35 (N), Reticulocyte count 0.88 (N).  EPO level 124 (N).  Haptoglobin 22 (H).  · 10/22/14 - Hemoglobin 12.5, hematocrit 37.3, MCV 91.6.  · 10/23/14 - Anemia resolved.   · 3/8/16 - WBC 5.8, hemoglobin 11.7, MCV 90.3, platelet count 245,000. Vitamin B12 of 189 (180-914), ferritin 61 (), iron 91 (), TIBC 345 (228-428).   · 3/15/16 -  ().        · 3/22/16 - Intrinsic factor antibody positive, antiparietal antibody negative. Vitamin B12 at 1000 mcg IM weekly started, but the patient after receiving first dose on 3/22/16 did not come back for injections until 4/13/16.   · 4/13/16 - WBC 5.1, hemoglobin 12.5, MCV 87.9, platelet count 201,000. Patient given B12 injection and then continued at home.   · 5/10/16 - WBC 5.1, hemoglobin 12.5, platelet count 201,000.   · 6/14/16 - Monthly Vitamin B12 injections continued at home.   · 7/11/16 - WBC 5.48, hemoglobin 12.7, MCV 86.2, platelet count 200,000.   · 8/16/16 - Patient continued on monthly Vitamin B12 injections at home.   · 1/5/17 - WBC 2.6 with 38% neutrophils, 56% lymphocytes, 5% monocytes, hemoglobin 10.5, .3, platelet count 63,000. Patient continued on Vitamin B12 injections 1000 mcg IM monthly at home.   · 3/20/17 - Retic 0.41 (0.5-1.5), creatinine 1.0 (0.4-1.0). Iron 12 (), TIBC 270 (228-428), ferritin 259 (), haptoglobin 340 (), TSH 0.43 (0.34-5.6), folate 6.0 (5.9-24.8). Serum protein electrophoresis revealed decreased gammaglobulins. Serum EDU had no monoclonal gammopathy identified. Stool Hemoccult was negative.   · 6/8/17 - WBC 6.3, hemoglobin 8.3, MCV 92.4, platelet count 50,000. Procrit 40,000 units subq weekly added for chemotherapy-induced anemia. Patient  continued on Vitamin B12 at 1000 mcg IM monthly at home.   · 7/5/17 - Iron 33 (), TIBC 328 (228-428), ferritin 211 (), TSH 2.46 (0.34-5.6).       · 7/31/17 - WBC 5.0, hemoglobin 11.2, MCV 89.7, platelet count 217,000. We will continue with the Procrit 40,000 units subq weekly for chemo-induced anemia when the hemoglobin falls below 10.0 and the patient is also to continue with the Vitamin B12 at 1000 mcg IM monthly at home. Creatinine 1.24 (0.5-0.99).    · 8/28/17 - WBC 9.6, hemoglobin 8.7, MCV 93.7, platelet count 133,000. Patient is to continue with the Procrit 40,000 units subq weekly and will receive that today. She is also to continue with the Vitamin B12 at 1000 mcg IM monthly at home.  · 10/16/17 - WBC 7.5, hemoglobin 9.6, MCV 98.4, platelet count 195,000. Patient is to continue with Procrit 40,000 units subq weekly and will receive Procrit today.   · 1/1/18 - Creatinine 1.3 (0.4-1.0).    · 2/19/18 - Procrit given for hemoglobin 9.9.   · 2/26/18 - Continue Procrit 40,000 units weekly p.r.n. hemoglobin <10. Continue Vitamin B12 at 1000 mcg IM monthly at home.   · 3/26/18 - Hemoglobin 8.3. Procrit dose increased to 60,000 units weekly. Iron 29 (), TIBC 306 (228-428), and iron sat 9% (15-50). Iron 29 (), TIBC 306 (228-428), iron saturation 9% (15-50).   · 3/27/18 - Order written to start Ferrous sulfate 325 mg p.o. b.i.d.    · 4/2/18 - Stool heme negative x3.   · 4/30/18 - Patient had not started Ferrous sulfate 325 mg p.o. b.i.d. and verbalized understanding to do so and to notify the office if side effects are not tolerable.   · 5/24/18 - Patient was hospitalized at [facility] between 5/24/18 and 5/26/18 with dark tarry stool. INR was subtherapeutic. She was given IV Protonix and Protonix 40 mg daily. She underwent an EGD by David Rogers M.D. revealing small hiatal hernia, small submucosal nodule near the cardia, erosive gastritis. Pathology on gastric antrum and body biopsy revealed  iron pill gastritis and no evidence of Helicobacter pylori. She then underwent a colonoscopy revealing diverticulosis, hemorrhoids and surgical changes from previous resection. It was recommended to consider outpatient M2A if hemoglobin continued to drop.   · 6/4/18 - Order written to discontinue iron pills and to use Injectafer 750 mg IV days 1 and 8 for low iron sats. Order written for Procrit 40,000 units subq weekly. Iron 65 (), TIBC 328 (228-428), iron saturation 20% (15-50), ferritin 123 ().   · 6/29/18 - Discussed patient’s intolerance of oral iron due to gastritis. Oral iron discontinued with plans for Injectafer should iron level drop in the future.   · 11/7/18 - Patient claims not to be taking Vitamin B12 injections at home. Asked to resume those.   · 12/3/18 - Patient reports she has not been taking her B12 injections for a couple of months. She needs some syringes and needles for that.   · 2/4/19 - Patient was uncertain if she is currently taking ferrous Sulfate or not. Patient with history of intolerance and will follow hemoglobin.   · 5/8/19 - Ferritin 64 ().  · 8/27/2019- iron 52 (), iron saturation 14% (15-50), TIBC 364 (228-420), and ferritin 74 ().  Injectafer 750 mg IV day 1 and 8 due to oral iron intolerance ordered.  · 8/30/2019- Injectafer 750 mg IV day 1 given.  Hemoglobin 10.8.  At the end of the infusion heart rate was 48 and the patient reported mild dizziness.  Patient was sent to the ED for evaluation with symptoms resolving rapidly.  Chest x-ray and CT head were negative for acute findings.  · 9/6/2019-Injectafer 750 mg IV day 8 given without adverse effects.  Hemoglobin 9.8.   · 9/25/19 - Iron 85 (). Iron saturation 25 (15-50). TIBC 335 (228-428). Ferritin  694 ().  Hemoglobin 10.3.  · 10/25/2019 hemoglobin 9.7.  Patient started on Retacrit 40,000 units subcu weekly.  · 12/17/2020 hemoglobin 11.9, hematocrit 38.3     Past Medical History:    Diagnosis Date   • Anemia 2013   • Cervical disc disorder 2013    Herniated disc, pinched nerve   • Clotting disorder (CMS/HCC) 2013    Low platelets from chemo   • Colon polyp 2013   • Deep vein thrombosis (CMS/HCC) 2013   • Diverticulosis 2013   • GERD (gastroesophageal reflux disease) 2016   • HL (hearing loss) 2016    I need hearing aids   • Hyperlipidemia 2013    Need my cholesterol rechecked   • Hypertension    • Joint pain 2013    Shoulder pain, torn rotator cuff   • Low back pain 2013   • Neuromuscular disorder (CMS/[facility]) 2015    Shingles, pinched nerves in my neck   • Osteopenia 2014   • Osteoporosis    • Ovarian cancer (CMS/[facility])     ovarian   • Ovarian cancer on left (CMS/[facility]) 1995   • Pneumonia 2010    Have had it several times   • Spinal stenosis 2013    Cervical   • Urinary tract infection 2013    Have had several utis       Past Surgical History:   Procedure Laterality Date   • CATARACT EXTRACTION     • COLON SURGERY     • COLONOSCOPY  05/26/2018   • EXPLORATORY LAPAROTOMY     • HERNIA REPAIR     • HYSTERECTOMY     • OOPHORECTOMY           Current Outpatient Medications:   •  acetaminophen (TYLENOL) 500 MG tablet, Take 1,000 mg by mouth Every 6 (Six) Hours As Needed for Mild Pain ., Disp: , Rfl:   •  atorvastatin (LIPITOR) 20 MG tablet, Take 20 mg by mouth every night at bedtime., Disp: , Rfl: 3  •  cyanocobalamin 1000 MCG/ML injection, Inject 1,000 mcg into the appropriate muscle as directed by prescriber Every 28 (Twenty-Eight) Days., Disp: , Rfl:   •  dexamethasone (DECADRON) 4 MG tablet, Take 1 tablet oral twice a day for 3 consecutive days beginning the day before chemotherapy and continue for 6 doses.Take with food., Disp: 6 tablet, Rfl: 5  •  doxycycline (MONODOX) 100 MG capsule, Take 1 capsule by mouth 2 (Two) Times a Day., Disp: 20 capsule, Rfl: 0  •  famotidine (PEPCID) 40 MG tablet, TAKE 1 TABLET BY MOUTH EVERY MORNING BEFORE BREAKFAST, Disp: 90 tablet, Rfl: 1  •  folic acid (FOLVITE) 1 MG  tablet, Take 1 tablet by mouth Daily., Disp: 30 tablet, Rfl: 6  •  folic acid (FOLVITE) 1 MG tablet, Take 1 tablet by mouth Daily. Start at least 7 days prior to chemotherapy until at least 3 weeks after all chemotherapy., Disp: 30 tablet, Rfl: 6  •  guaiFENesin-codeine (GUAIFENESIN AC) 100-10 MG/5ML liquid, Take 5 mL by mouth 3 (Three) Times a Day As Needed for Cough., Disp: 120 mL, Rfl: 0  •  KLOR-CON 20 MEQ CR tablet, TAKE 3 TABLETS BY MOUTH EVERY MORNING AND TAKE 2 TABLETS AT BEDTIME, Disp: 150 tablet, Rfl: 5  •  loperamide (Imodium A-D) 2 MG tablet, Take 1 tablet by mouth 3 (Three) Times a Day As Needed for Diarrhea., Disp: 60 tablet, Rfl: 2  •  magnesium oxide (MAG-OX) 400 MG tablet, TAKE 1 TABLET BY MOUTH TWICE A DAY, Disp: 180 tablet, Rfl: 1  •  methylPREDNISolone (MEDROL) 4 MG dose pack, Take  by mouth Take As Directed. Take as directed on package instructions.  Dr. Delong prescribed., Disp: , Rfl:   •  ondansetron (ZOFRAN) 8 MG tablet, Take 1 tablet by mouth 3 (Three) Times a Day As Needed for Nausea or Vomiting., Disp: 30 tablet, Rfl: 5  •  ondansetron ODT (ZOFRAN-ODT) 8 MG disintegrating tablet, Place 1 tablet under the tongue Every 8 (Eight) Hours As Needed for Nausea or Vomiting., Disp: 30 tablet, Rfl: 3  •  oxyCODONE (ROXICODONE) 10 MG tablet, Take 1 tablet by mouth 3 (Three) Times a Day As Needed for Moderate Pain ., Disp: 90 tablet, Rfl: 0  •  pregabalin (LYRICA) 100 MG capsule, Take 1 capsule by mouth 3 (Three) Times a Day., Disp: 90 capsule, Rfl: 2  •  sertraline (ZOLOFT) 50 MG tablet, TAKE 1 TABLET BY MOUTH EVERY EVENING BEFORE BED, Disp: 90 tablet, Rfl: 1  •  temazepam (RESTORIL) 30 MG capsule, Take 1 capsule by mouth At Night As Needed for Sleep., Disp: 30 capsule, Rfl: 1  •  triamterene-hydrochlorothiazide (DYAZIDE) 37.5-25 MG per capsule, TAKE 1 CAPSULE BY MOUTH EVERY DAY, Disp: 90 capsule, Rfl: 1  •  warfarin (COUMADIN) 5 MG tablet, Take 1 tablet by mouth Daily. At 1700 as directed, Disp: 90  tablet, Rfl: 0    Allergies   Allergen Reactions   • Morphine Nausea Only and Hives   • Penicillin V Potassium Rash   • Sulfa Antibiotics Rash   • Levaquin [Levofloxacin] Nausea Only and Dizziness     When taken with Coumadin   • Amoxicillin Rash   • Norco [Hydrocodone-Acetaminophen] Rash       Family History   Problem Relation Age of Onset   • Hypertension Mother    • Cancer Father    • Hypertension Father    • Hypertension Sister    • Hypertension Brother    • Stroke Brother        Cancer-related family history includes Cancer in her father.    Social History     Tobacco Use   • Smoking status: Never Smoker   • Smokeless tobacco: Never Used   Substance Use Topics   • Alcohol use: No     Frequency: Never     Comment: caffeine 32-64oz qd   • Drug use: No     HPI, ROS and PFSH have been reviewed and confirmed on 12/30/2020.     SUBJECTIVE:  The patient presented for a scheduled follow up appointment unaccompanied.  She was feeling well. She did not have any new problems.     REVIEW OF SYSTEMS:  Review of Systems   Constitutional: Positive for fatigue ( mild ). Negative for activity change, appetite change, chills, fever and unexpected weight change.   HENT: Negative for mouth sores, sore throat and tinnitus.    Eyes: Negative for photophobia, pain, redness and visual disturbance.        Wears glasses   Needs cataract surgery    Respiratory: Positive for cough ( mild - dry ). Negative for shortness of breath.    Cardiovascular: Negative for chest pain, palpitations and leg swelling.   Gastrointestinal: Negative for abdominal pain, constipation, diarrhea, nausea and vomiting.   Genitourinary: Negative for dysuria and hematuria.   Musculoskeletal: Negative for arthralgias, back pain, myalgias and neck pain.   Skin: Negative for rash and wound.   Allergic/Immunologic: Negative for environmental allergies.   Neurological: Positive for headaches ( daily ). Negative for dizziness, weakness, light-headedness and numbness.    Hematological: Negative for adenopathy. Does not bruise/bleed easily.   Psychiatric/Behavioral: Negative for dysphoric mood. The patient is not nervous/anxious.      OBJECTIVE:  There were no vitals filed for this visit.  Temp 97.5   Pulse 64  RR 18  /92  Height 165.1 cm   Weight 81.6 kg   BSA 1.89  BMI 30    ECOG  (1) Restricted in physically strenuous activity, ambulatory and able to do work of light nature    Physical Exam   Constitutional: She is oriented to person, place, and time. No distress.   Obese    HENT:   Head: Normocephalic and atraumatic.   Mouth/Throat: Mucous membranes are moist.   Eyes: Conjunctivae are normal. Right eye exhibits no discharge. Left eye exhibits no discharge. No scleral icterus.   Neck: Normal range of motion. Neck supple. No thyromegaly present.   Cardiovascular: Normal rate, regular rhythm and normal heart sounds. Exam reveals no gallop and no friction rub.   Pulmonary/Chest: Effort normal. No stridor. No respiratory distress. She has no wheezes.   Left chest wall port    Abdominal: Soft. Bowel sounds are normal. She exhibits no mass. There is no abdominal tenderness. There is no rebound and no guarding.   Musculoskeletal: Normal range of motion. No tenderness.   Lymphadenopathy:     She has no cervical adenopathy.   Neurological: She is alert and oriented to person, place, and time. She exhibits normal muscle tone.   Skin: Skin is warm and dry. She is not diaphoretic. No erythema.   Psychiatric: Her behavior is normal. Mood normal.   Nursing note and vitals reviewed.    RECENT LABS  WBC   Date Value Ref Range Status   12/30/2020 5.95 3.40 - 10.80 10*3/mm3 Final   07/05/2020 4.04 (L) 4.5 - 11.0 10*3/uL Final     RBC   Date Value Ref Range Status   12/30/2020 3.93 3.77 - 5.28 10*6/mm3 Final   07/05/2020 3.68 (L) 4.0 - 5.2 10*6/uL Final     Hemoglobin   Date Value Ref Range Status   12/30/2020 11.2 (L) 12.0 - 15.9 g/dL Final   07/05/2020 11.5 (L) 12.0 - 16.0 g/dL Final      Hematocrit   Date Value Ref Range Status   12/30/2020 35.7 34.0 - 46.6 % Final   07/05/2020 35.3 (L) 36.0 - 46.0 % Final     MCV   Date Value Ref Range Status   12/30/2020 90.8 79.0 - 97.0 fL Final   07/05/2020 95.9 80.0 - 100.0 fL Final     MCH   Date Value Ref Range Status   12/30/2020 28.5 26.6 - 33.0 pg Final   07/05/2020 31.3 26.0 - 34.0 pg Final     MCHC   Date Value Ref Range Status   12/30/2020 31.4 (L) 31.5 - 35.7 g/dL Final   07/05/2020 32.6 31.0 - 37.0 g/dL Final     RDW   Date Value Ref Range Status   12/30/2020 15.9 (H) 12.3 - 15.4 % Final   07/05/2020 15.9 12.0 - 16.8 % Final     RDW-SD   Date Value Ref Range Status   12/30/2020 51.8 37.0 - 54.0 fl Final     MPV   Date Value Ref Range Status   12/30/2020 10.7 6.0 - 12.0 fL Final   07/05/2020 10.2 6.7 - 10.8 fL Final     Platelets   Date Value Ref Range Status   12/30/2020 207 140 - 450 10*3/mm3 Final   07/05/2020 158 140 - 440 10*3/uL Final     Neutrophil Rel %   Date Value Ref Range Status   07/05/2020 54.0 45 - 80 % Final     Neutrophil %   Date Value Ref Range Status   12/30/2020 67.1 42.7 - 76.0 % Final     Lymphocyte Rel %   Date Value Ref Range Status   07/05/2020 30.4 15 - 50 % Final     Lymphocyte %   Date Value Ref Range Status   12/30/2020 18.5 (L) 19.6 - 45.3 % Final     Monocyte Rel %   Date Value Ref Range Status   07/05/2020 12.4 0 - 15 % Final     Monocyte %   Date Value Ref Range Status   12/30/2020 10.6 5.0 - 12.0 % Final     Eosinophil %   Date Value Ref Range Status   12/30/2020 3.5 0.3 - 6.2 % Final   07/05/2020 3.0 0 - 7 % Final     Basophil Rel %   Date Value Ref Range Status   07/05/2020 0.2 0 - 2 % Final     Basophil %   Date Value Ref Range Status   12/30/2020 0.3 0.0 - 1.5 % Final     Immature Grans %   Date Value Ref Range Status   07/05/2020 0.0 0 % Final     Neutrophils Absolute   Date Value Ref Range Status   07/05/2020 2.18 2.0 - 8.8 10*3/uL Final     Neutrophils, Absolute   Date Value Ref Range Status   12/30/2020  3.99 1.70 - 7.00 10*3/mm3 Final     Lymphocytes Absolute   Date Value Ref Range Status   07/05/2020 1.23 0.7 - 5.5 10*3/uL Final     Lymphocytes, Absolute   Date Value Ref Range Status   12/30/2020 1.10 0.70 - 3.10 10*3/mm3 Final     Monocytes Absolute   Date Value Ref Range Status   07/05/2020 0.50 0.0 - 1.7 10*3/uL Final     Monocytes, Absolute   Date Value Ref Range Status   12/30/2020 0.63 0.10 - 0.90 10*3/mm3 Final     Eosinophils Absolute   Date Value Ref Range Status   07/05/2020 0.12 0.0 - 0.8 10*3/uL Final     Eosinophils, Absolute   Date Value Ref Range Status   12/30/2020 0.21 0.00 - 0.40 10*3/mm3 Final     Basophils Absolute   Date Value Ref Range Status   07/05/2020 0.01 0.0 - 0.2 10*3/uL Final     Basophils, Absolute   Date Value Ref Range Status   12/30/2020 0.02 0.00 - 0.20 10*3/mm3 Final     Immature Grans, Absolute   Date Value Ref Range Status   07/05/2020 0.00 <1 10*3/uL Final     nRBC   Date Value Ref Range Status   10/02/2020 0.1 0.0 - 0.2 /100 WBC Final       Lab Results   Component Value Date    GLUCOSE 88 12/17/2020    BUN 14 12/17/2020    CREATININE 0.95 12/17/2020    EGFRIFNONA 59 (L) 12/17/2020    EGFRIFAFRI >60 06/07/2019    BCR 14.7 12/17/2020    K 4.6 12/17/2020    CO2 29.0 12/17/2020    CALCIUM 9.8 12/17/2020    ALBUMIN 4.10 12/17/2020    LABIL2 1.3 07/03/2020    AST 17 12/17/2020    ALT 8 12/17/2020     ASSESSMENT:  1. Recurrent ovarian cancer metastases to the brain was on carboplatinum, Doxil.  Patient had had carboplatinum Taxol, carbo Taxotere, Gemzar single agent, niraparib and was on Doxil and carboplatin.  Doxil was discontinued due to approaching of lifetime dosing.  Progressed on single agent topotecan.  Refer to paradigm testing for future options at time of progression.  Patient has had progression of disease and therefore platinum was discontinued.   2. Patient has evidence of disease progression in the chest, abdomen and pelvis now with bone involvement as well. Started  Alimta on 12/30/2020.  3. Progressive brain metastasis: Status post stereotactic brain radiation under the care of Dr. Delong and recent MRI of the brain on 12/15/2020 shows probable progression.  Dr. Delong following,  4. Iron deficiency anemia: Intermittently on  Iron  5. B12 deficiency on parenteral B12 injections  6. History of DVT on anticoagulation therapy with warfarin  7. Pancytopenia: Due to chemotherapy: On Procrit  8. Hypomagnesemia: Continue to monitor levels and infuse as needed  9. Monitoring for chemotherapy toxicities  10. Iron deficiency anemia     PLAN:  1. CBC, CMP, ferritin, iron profile, and CA-125 today   2.  monthly: Check level today.  3. Continue folic acid 1 mg p.o. daily -reviewed needs to stay on drug to prevent side effects from Alimta   4. Continue B12 injections 1000 mcg IM monthly, next due 1/14/2021  5. Give Injectafer 750 mg IV x 2 weekly doses   6. Continue magnesium oxide 400 mg three times a day and weekly IV replacement as needed.   7. Continue Retacrit 40,000 units subcu weekly for hemoglobin less than 10, for chemotherapy-induced anemia  8. Start Alimta, cycle 1 day 1 today - RN to perform chemotherapy teaching   9. MRI brain ordered by Dr. Delong for 1/29/2021  10. Last CT imaging was on 11/30/2020 - consider repeat imaging in end of 2/2021  11. Reviewed fever precautions   12. RTC in 2 weeks with Cindy Ball MD     I have reviewed labs results, imaging, vitals, and medications with the patient today.   Lab Results   Component Value Date    WBC 5.95 12/30/2020    HGB 11.2 (L) 12/30/2020    HCT 35.7 12/30/2020    MCV 90.8 12/30/2020     12/30/2020     Patient verbalized understanding and is in agreement of the above plans.    Electronically signed by IRENA Dumas, 12/30/20, 9:58 AM EST.

## 2020-12-18 NOTE — TELEPHONE ENCOUNTER
I attempted to reach pt to discuss INR reading from yesterday but her phone is ringing once and going to voicemail. I left a voicemail for pt to return call to office.

## 2020-12-21 ENCOUNTER — TELEPHONE (OUTPATIENT)
Dept: ONCOLOGY | Facility: HOSPITAL | Age: 65
End: 2020-12-21

## 2020-12-21 ENCOUNTER — PATIENT MESSAGE (OUTPATIENT)
Dept: FAMILY MEDICINE CLINIC | Facility: CLINIC | Age: 65
End: 2020-12-21

## 2020-12-21 DIAGNOSIS — J40 BRONCHITIS: Primary | ICD-10-CM

## 2020-12-21 RX ORDER — DOXYCYCLINE 100 MG/1
100 CAPSULE ORAL 2 TIMES DAILY
Qty: 20 CAPSULE | Refills: 0 | Status: SHIPPED | OUTPATIENT
Start: 2020-12-21 | End: 2021-01-27

## 2020-12-21 NOTE — TELEPHONE ENCOUNTER
Dtr Paz oJiner, called stating that the pt is still coughing really bad and she is afraid she has PNA or something. She is SOB. Her COVID-19 was negative. Will you order a chest x-ray? Her Onc wants to start the pt back on chemo but she is afraid because of what's going on with her rt now. [phone]

## 2020-12-21 NOTE — TELEPHONE ENCOUNTER
I called patient regarding her appt on Wednesday but had to leave a voicemail. I wanted to see if she could change her appt from Wednesday to Tuesday at 0830. If she can do that then nursing will do the teaching and she can be taken off the NP schedule. If she does not call back we will leave appt as is.

## 2020-12-21 NOTE — TELEPHONE ENCOUNTER
Pt's daughter is calling stating that pt has a severe cough, SOA, and weakness. She states that pt was tested for COVID and it was negative. Pati has a call out to pt's PCP to get her appointment. They are wanting to do a chest xray to rule out pneumonia. I spoke with Valencia Cadena NP and she agrees to cancel chemo for this week and f/u next week as planned. Pt's daughter verbalized understanding to call us back with update.

## 2020-12-22 ENCOUNTER — HOSPITAL ENCOUNTER (OUTPATIENT)
Dept: GENERAL RADIOLOGY | Facility: HOSPITAL | Age: 65
Discharge: HOME | End: 2020-12-22

## 2020-12-22 ENCOUNTER — LAB (OUTPATIENT)
Dept: LAB | Facility: HOSPITAL | Age: 65
End: 2020-12-22

## 2020-12-22 DIAGNOSIS — C56.1 MALIGNANT NEOPLASM OF RIGHT OVARY (HCC): ICD-10-CM

## 2020-12-22 PROCEDURE — 71046 X-RAY EXAM CHEST 2 VIEWS: CPT

## 2020-12-23 ENCOUNTER — APPOINTMENT (OUTPATIENT)
Dept: LAB | Facility: HOSPITAL | Age: 65
End: 2020-12-23

## 2020-12-23 ENCOUNTER — HOSPITAL ENCOUNTER (OUTPATIENT)
Dept: ONCOLOGY | Facility: HOSPITAL | Age: 65
Setting detail: INFUSION SERIES
End: 2020-12-23

## 2020-12-23 ENCOUNTER — PATIENT MESSAGE (OUTPATIENT)
Dept: FAMILY MEDICINE CLINIC | Facility: CLINIC | Age: 65
End: 2020-12-23

## 2020-12-23 DIAGNOSIS — J40 BRONCHITIS: ICD-10-CM

## 2020-12-23 RX ORDER — GUAIFENESIN AND CODEINE PHOSPHATE 100; 10 MG/5ML; MG/5ML
5 SOLUTION ORAL 3 TIMES DAILY PRN
Qty: 120 ML | Refills: 0 | Status: SHIPPED | OUTPATIENT
Start: 2020-12-23 | End: 2021-02-01

## 2020-12-28 ENCOUNTER — TELEPHONE (OUTPATIENT)
Dept: PAIN MEDICINE | Facility: HOSPITAL | Age: 65
End: 2020-12-28

## 2020-12-28 ENCOUNTER — TELEPHONE (OUTPATIENT)
Dept: ONCOLOGY | Facility: HOSPITAL | Age: 65
End: 2020-12-28

## 2020-12-28 NOTE — TELEPHONE ENCOUNTER
Pati pt's daughter is calling stating that her mother is still sick with pneumonia and taking antibiotics. She is wanting to know if she needs to cancel her appointments for this week. I advised her to keep them since pt is seeing Valencia Cadena NP to allow her to follow up with pt. Pati verbalized understanding.

## 2020-12-30 ENCOUNTER — LAB (OUTPATIENT)
Dept: LAB | Facility: HOSPITAL | Age: 65
End: 2020-12-30

## 2020-12-30 ENCOUNTER — TELEPHONE (OUTPATIENT)
Dept: ONCOLOGY | Facility: CLINIC | Age: 65
End: 2020-12-30

## 2020-12-30 ENCOUNTER — HOSPITAL ENCOUNTER (OUTPATIENT)
Dept: ONCOLOGY | Facility: HOSPITAL | Age: 65
Setting detail: INFUSION SERIES
Discharge: HOME | End: 2020-12-30

## 2020-12-30 ENCOUNTER — OFFICE VISIT (OUTPATIENT)
Dept: ONCOLOGY | Facility: CLINIC | Age: 65
End: 2020-12-30

## 2020-12-30 VITALS
HEIGHT: 65 IN | SYSTOLIC BLOOD PRESSURE: 118 MMHG | BODY MASS INDEX: 29.99 KG/M2 | RESPIRATION RATE: 18 BRPM | WEIGHT: 180 LBS | DIASTOLIC BLOOD PRESSURE: 82 MMHG | TEMPERATURE: 97.5 F | HEART RATE: 64 BPM

## 2020-12-30 DIAGNOSIS — D50.8 OTHER IRON DEFICIENCY ANEMIA: Primary | ICD-10-CM

## 2020-12-30 DIAGNOSIS — K90.49 MALABSORPTION DUE TO INTOLERANCE, NOT ELSEWHERE CLASSIFIED: ICD-10-CM

## 2020-12-30 DIAGNOSIS — R52 PAIN: ICD-10-CM

## 2020-12-30 DIAGNOSIS — Z95.828 PORT-A-CATH IN PLACE: ICD-10-CM

## 2020-12-30 DIAGNOSIS — K90.9 MALABSORPTION OF IRON: ICD-10-CM

## 2020-12-30 DIAGNOSIS — E83.42 HYPOMAGNESEMIA: ICD-10-CM

## 2020-12-30 DIAGNOSIS — C56.1 MALIGNANT NEOPLASM OF RIGHT OVARY (HCC): Primary | ICD-10-CM

## 2020-12-30 DIAGNOSIS — I82.722 CHRONIC DEEP VEIN THROMBOSIS (DVT) OF LEFT UPPER EXTREMITY, UNSPECIFIED VEIN (HCC): ICD-10-CM

## 2020-12-30 PROBLEM — D50.9 IRON DEFICIENCY ANEMIA: Status: ACTIVE | Noted: 2020-12-30

## 2020-12-30 LAB
ALBUMIN SERPL-MCNC: 3.7 G/DL (ref 3.5–5.2)
ALBUMIN/GLOB SERPL: 1.5 G/DL
ALP SERPL-CCNC: 194 U/L (ref 39–117)
ALT SERPL W P-5'-P-CCNC: 7 U/L (ref 1–33)
ANION GAP SERPL CALCULATED.3IONS-SCNC: 11 MMOL/L (ref 5–15)
AST SERPL-CCNC: 16 U/L (ref 1–32)
BASOPHILS # BLD AUTO: 0.02 10*3/MM3 (ref 0–0.2)
BASOPHILS NFR BLD AUTO: 0.3 % (ref 0–1.5)
BILIRUB SERPL-MCNC: 0.2 MG/DL (ref 0–1.2)
BUN SERPL-MCNC: 16 MG/DL (ref 8–23)
BUN/CREAT SERPL: 20 (ref 7–25)
CALCIUM SPEC-SCNC: 9.2 MG/DL (ref 8.6–10.5)
CANCER AG125 SERPL QL: 102.5 U/ML (ref 0–38.1)
CHLORIDE SERPL-SCNC: 102 MMOL/L (ref 98–107)
CO2 SERPL-SCNC: 25 MMOL/L (ref 22–29)
CREAT SERPL-MCNC: 0.8 MG/DL (ref 0.57–1)
DEPRECATED RDW RBC AUTO: 51.8 FL (ref 37–54)
EOSINOPHIL # BLD AUTO: 0.21 10*3/MM3 (ref 0–0.4)
EOSINOPHIL NFR BLD AUTO: 3.5 % (ref 0.3–6.2)
ERYTHROCYTE [DISTWIDTH] IN BLOOD BY AUTOMATED COUNT: 15.9 % (ref 12.3–15.4)
FERRITIN SERPL-MCNC: 304.7 NG/ML (ref 13–150)
GFR SERPL CREATININE-BSD FRML MDRD: 72 ML/MIN/1.73
GLOBULIN UR ELPH-MCNC: 2.4 GM/DL
GLUCOSE SERPL-MCNC: 89 MG/DL (ref 65–99)
HCT VFR BLD AUTO: 35.7 % (ref 34–46.6)
HGB BLD-MCNC: 11.2 G/DL (ref 12–15.9)
INR PPP: 1.9
IRON 24H UR-MRATE: 35 MCG/DL (ref 37–145)
IRON SATN MFR SERPL: 13 % (ref 20–50)
LYMPHOCYTES # BLD AUTO: 1.1 10*3/MM3 (ref 0.7–3.1)
LYMPHOCYTES NFR BLD AUTO: 18.5 % (ref 19.6–45.3)
MCH RBC QN AUTO: 28.5 PG (ref 26.6–33)
MCHC RBC AUTO-ENTMCNC: 31.4 G/DL (ref 31.5–35.7)
MCV RBC AUTO: 90.8 FL (ref 79–97)
MONOCYTES # BLD AUTO: 0.63 10*3/MM3 (ref 0.1–0.9)
MONOCYTES NFR BLD AUTO: 10.6 % (ref 5–12)
NEUTROPHILS NFR BLD AUTO: 3.99 10*3/MM3 (ref 1.7–7)
NEUTROPHILS NFR BLD AUTO: 67.1 % (ref 42.7–76)
PLATELET # BLD AUTO: 207 10*3/MM3 (ref 140–450)
PMV BLD AUTO: 10.7 FL (ref 6–12)
POTASSIUM SERPL-SCNC: 4 MMOL/L (ref 3.5–5.2)
PROT SERPL-MCNC: 6.1 G/DL (ref 6–8.5)
RBC # BLD AUTO: 3.93 10*6/MM3 (ref 3.77–5.28)
SODIUM SERPL-SCNC: 138 MMOL/L (ref 136–145)
TIBC SERPL-MCNC: 273 MCG/DL (ref 298–536)
TRANSFERRIN SERPL-MCNC: 183 MG/DL (ref 200–360)
WBC # BLD AUTO: 5.95 10*3/MM3 (ref 3.4–10.8)

## 2020-12-30 PROCEDURE — 25010000003 HEPARIN LOCK FLUSH PER 10 UNITS: Performed by: INTERNAL MEDICINE

## 2020-12-30 PROCEDURE — 84466 ASSAY OF TRANSFERRIN: CPT | Performed by: NURSE PRACTITIONER

## 2020-12-30 PROCEDURE — 85610 PROTHROMBIN TIME: CPT

## 2020-12-30 PROCEDURE — 80053 COMPREHEN METABOLIC PANEL: CPT | Performed by: NURSE PRACTITIONER

## 2020-12-30 PROCEDURE — 99214 OFFICE O/P EST MOD 30 MIN: CPT | Performed by: NURSE PRACTITIONER

## 2020-12-30 PROCEDURE — 36591 DRAW BLOOD OFF VENOUS DEVICE: CPT

## 2020-12-30 PROCEDURE — 25010000002 PEMETREXED PER 10 MG: Performed by: INTERNAL MEDICINE

## 2020-12-30 PROCEDURE — 82728 ASSAY OF FERRITIN: CPT | Performed by: NURSE PRACTITIONER

## 2020-12-30 PROCEDURE — 85025 COMPLETE CBC W/AUTO DIFF WBC: CPT | Performed by: INTERNAL MEDICINE

## 2020-12-30 PROCEDURE — 86304 IMMUNOASSAY TUMOR CA 125: CPT | Performed by: NURSE PRACTITIONER

## 2020-12-30 PROCEDURE — 96413 CHEMO IV INFUSION 1 HR: CPT

## 2020-12-30 PROCEDURE — 83540 ASSAY OF IRON: CPT | Performed by: NURSE PRACTITIONER

## 2020-12-30 RX ORDER — SODIUM CHLORIDE 0.9 % (FLUSH) 0.9 %
20 SYRINGE (ML) INJECTION AS NEEDED
Status: CANCELLED | OUTPATIENT
Start: 2020-12-30

## 2020-12-30 RX ORDER — SODIUM CHLORIDE 9 MG/ML
250 INJECTION, SOLUTION INTRAVENOUS ONCE
Status: CANCELLED | OUTPATIENT
Start: 2021-01-27

## 2020-12-30 RX ORDER — HEPARIN SODIUM (PORCINE) LOCK FLUSH IV SOLN 100 UNIT/ML 100 UNIT/ML
500 SOLUTION INTRAVENOUS AS NEEDED
Status: DISCONTINUED | OUTPATIENT
Start: 2020-12-30 | End: 2020-12-31 | Stop reason: HOSPADM

## 2020-12-30 RX ORDER — SODIUM CHLORIDE 9 MG/ML
250 INJECTION, SOLUTION INTRAVENOUS ONCE
Status: COMPLETED | OUTPATIENT
Start: 2020-12-30 | End: 2020-12-30

## 2020-12-30 RX ORDER — HEPARIN SODIUM (PORCINE) LOCK FLUSH IV SOLN 100 UNIT/ML 100 UNIT/ML
500 SOLUTION INTRAVENOUS AS NEEDED
Status: CANCELLED | OUTPATIENT
Start: 2020-12-30

## 2020-12-30 RX ORDER — SODIUM CHLORIDE 9 MG/ML
250 INJECTION, SOLUTION INTRAVENOUS ONCE
Status: CANCELLED | OUTPATIENT
Start: 2021-01-20

## 2020-12-30 RX ORDER — SODIUM CHLORIDE 0.9 % (FLUSH) 0.9 %
20 SYRINGE (ML) INJECTION AS NEEDED
Status: DISCONTINUED | OUTPATIENT
Start: 2020-12-30 | End: 2020-12-31 | Stop reason: HOSPADM

## 2020-12-30 RX ADMIN — SODIUM CHLORIDE 950 MG: 900 INJECTION, SOLUTION INTRAVENOUS at 10:37

## 2020-12-30 RX ADMIN — Medication 10 ML: at 10:56

## 2020-12-30 RX ADMIN — SODIUM CHLORIDE 250 ML: 9 INJECTION, SOLUTION INTRAVENOUS at 10:36

## 2020-12-30 RX ADMIN — HEPARIN 500 UNITS: 100 SYRINGE at 10:56

## 2020-12-30 NOTE — TELEPHONE ENCOUNTER
Attempted to call pt to let her know she needs IV iron. No answer or identifying VM, callback number left.

## 2020-12-31 RX ORDER — OXYCODONE HYDROCHLORIDE 10 MG/1
10 TABLET ORAL 3 TIMES DAILY PRN
Qty: 90 TABLET | Refills: 0 | Status: SHIPPED | OUTPATIENT
Start: 2020-12-31 | End: 2021-01-12 | Stop reason: SDUPTHER

## 2021-01-01 ENCOUNTER — TRANSITIONAL CARE MANAGEMENT TELEPHONE ENCOUNTER (OUTPATIENT)
Dept: CALL CENTER | Facility: HOSPITAL | Age: 66
End: 2021-01-01

## 2021-01-01 ENCOUNTER — LAB (OUTPATIENT)
Dept: LAB | Facility: HOSPITAL | Age: 66
End: 2021-01-01

## 2021-01-01 ENCOUNTER — HOSPITAL ENCOUNTER (OUTPATIENT)
Dept: ONCOLOGY | Facility: HOSPITAL | Age: 66
Setting detail: INFUSION SERIES
Discharge: HOME | End: 2021-02-11

## 2021-01-01 ENCOUNTER — OUTSIDE FACILITY SERVICE (OUTPATIENT)
Dept: FAMILY MEDICINE CLINIC | Facility: CLINIC | Age: 66
End: 2021-01-01

## 2021-01-01 ENCOUNTER — TELEPHONE (OUTPATIENT)
Dept: ONCOLOGY | Facility: CLINIC | Age: 66
End: 2021-01-01

## 2021-01-01 ENCOUNTER — PATIENT MESSAGE (OUTPATIENT)
Dept: PAIN MEDICINE | Facility: CLINIC | Age: 66
End: 2021-01-01

## 2021-01-01 ENCOUNTER — APPOINTMENT (OUTPATIENT)
Dept: CARDIOLOGY | Facility: HOSPITAL | Age: 66
End: 2021-01-01

## 2021-01-01 ENCOUNTER — HOSPITAL ENCOUNTER (OUTPATIENT)
Dept: ONCOLOGY | Facility: HOSPITAL | Age: 66
Setting detail: INFUSION SERIES
Discharge: HOME | End: 2021-07-07

## 2021-01-01 ENCOUNTER — TELEPHONE (OUTPATIENT)
Dept: RADIATION ONCOLOGY | Facility: HOSPITAL | Age: 66
End: 2021-01-01

## 2021-01-01 ENCOUNTER — OFFICE VISIT (OUTPATIENT)
Dept: RADIATION ONCOLOGY | Facility: HOSPITAL | Age: 66
End: 2021-01-01

## 2021-01-01 ENCOUNTER — ANTICOAGULATION VISIT (OUTPATIENT)
Dept: ONCOLOGY | Facility: HOSPITAL | Age: 66
End: 2021-01-01

## 2021-01-01 ENCOUNTER — HOSPITAL ENCOUNTER (OUTPATIENT)
Dept: ONCOLOGY | Facility: HOSPITAL | Age: 66
Setting detail: INFUSION SERIES
Discharge: HOME | End: 2021-09-16

## 2021-01-01 ENCOUNTER — OFFICE VISIT (OUTPATIENT)
Dept: SURGERY | Facility: CLINIC | Age: 66
End: 2021-01-01

## 2021-01-01 ENCOUNTER — APPOINTMENT (OUTPATIENT)
Dept: LAB | Facility: HOSPITAL | Age: 66
End: 2021-01-01

## 2021-01-01 ENCOUNTER — APPOINTMENT (OUTPATIENT)
Dept: ONCOLOGY | Facility: HOSPITAL | Age: 66
End: 2021-01-01

## 2021-01-01 ENCOUNTER — HOME HEALTH ADMISSION (OUTPATIENT)
Dept: HOME HEALTH SERVICES | Facility: HOME HEALTHCARE | Age: 66
End: 2021-01-01

## 2021-01-01 ENCOUNTER — HOSPITAL ENCOUNTER (OUTPATIENT)
Dept: ONCOLOGY | Facility: HOSPITAL | Age: 66
Setting detail: INFUSION SERIES
Discharge: HOME | End: 2021-04-23

## 2021-01-01 ENCOUNTER — HOSPITAL ENCOUNTER (INPATIENT)
Facility: HOSPITAL | Age: 66
LOS: 5 days | Discharge: NURSING, REHAB OR LONG-TERM CARE | End: 2021-12-13
Attending: INTERNAL MEDICINE | Admitting: INTERNAL MEDICINE

## 2021-01-01 ENCOUNTER — HOSPITAL ENCOUNTER (OUTPATIENT)
Dept: ONCOLOGY | Facility: HOSPITAL | Age: 66
Setting detail: INFUSION SERIES
Discharge: HOME | End: 2021-06-11

## 2021-01-01 ENCOUNTER — APPOINTMENT (OUTPATIENT)
Dept: GENERAL RADIOLOGY | Facility: HOSPITAL | Age: 66
End: 2021-01-01

## 2021-01-01 ENCOUNTER — OFFICE VISIT (OUTPATIENT)
Dept: FAMILY MEDICINE CLINIC | Facility: CLINIC | Age: 66
End: 2021-01-01

## 2021-01-01 ENCOUNTER — TELEPHONE (OUTPATIENT)
Dept: ONCOLOGY | Facility: HOSPITAL | Age: 66
End: 2021-01-01

## 2021-01-01 ENCOUNTER — HOSPITAL ENCOUNTER (OUTPATIENT)
Facility: HOSPITAL | Age: 66
Discharge: HOME | End: 2021-06-15
Attending: INTERNAL MEDICINE | Admitting: INTERNAL MEDICINE

## 2021-01-01 ENCOUNTER — TELEPHONE (OUTPATIENT)
Dept: PAIN MEDICINE | Facility: CLINIC | Age: 66
End: 2021-01-01

## 2021-01-01 ENCOUNTER — HOSPITAL ENCOUNTER (OUTPATIENT)
Dept: ONCOLOGY | Facility: HOSPITAL | Age: 66
Discharge: HOME | End: 2021-12-02

## 2021-01-01 ENCOUNTER — HOSPITAL ENCOUNTER (OUTPATIENT)
Dept: ONCOLOGY | Facility: HOSPITAL | Age: 66
Setting detail: INFUSION SERIES
Discharge: HOME | End: 2021-11-19

## 2021-01-01 ENCOUNTER — HOSPITAL ENCOUNTER (OUTPATIENT)
Dept: CT IMAGING | Facility: HOSPITAL | Age: 66
Discharge: HOME | End: 2021-03-01

## 2021-01-01 ENCOUNTER — HOSPITAL ENCOUNTER (OUTPATIENT)
Dept: ONCOLOGY | Facility: HOSPITAL | Age: 66
Setting detail: INFUSION SERIES
Discharge: HOME | End: 2021-03-10

## 2021-01-01 ENCOUNTER — ANESTHESIA (OUTPATIENT)
Dept: GASTROENTEROLOGY | Facility: HOSPITAL | Age: 66
End: 2021-01-01

## 2021-01-01 ENCOUNTER — HOSPITAL ENCOUNTER (OUTPATIENT)
Dept: ONCOLOGY | Facility: HOSPITAL | Age: 66
Setting detail: INFUSION SERIES
Discharge: HOME | End: 2021-03-17

## 2021-01-01 ENCOUNTER — PATIENT MESSAGE (OUTPATIENT)
Dept: FAMILY MEDICINE CLINIC | Facility: CLINIC | Age: 66
End: 2021-01-01

## 2021-01-01 ENCOUNTER — HOSPITAL ENCOUNTER (OUTPATIENT)
Dept: MRI IMAGING | Facility: HOSPITAL | Age: 66
Discharge: HOME | End: 2021-10-05

## 2021-01-01 ENCOUNTER — PATIENT MESSAGE (OUTPATIENT)
Dept: ONCOLOGY | Facility: CLINIC | Age: 66
End: 2021-01-01

## 2021-01-01 ENCOUNTER — READMISSION MANAGEMENT (OUTPATIENT)
Dept: CALL CENTER | Facility: HOSPITAL | Age: 66
End: 2021-01-01

## 2021-01-01 ENCOUNTER — HOSPITAL ENCOUNTER (OUTPATIENT)
Dept: CARDIOLOGY | Facility: HOSPITAL | Age: 66
Discharge: HOME | End: 2021-03-04
Admitting: INTERNAL MEDICINE

## 2021-01-01 ENCOUNTER — APPOINTMENT (OUTPATIENT)
Dept: CT IMAGING | Facility: HOSPITAL | Age: 66
End: 2021-01-01

## 2021-01-01 ENCOUNTER — PATIENT OUTREACH (OUTPATIENT)
Dept: CASE MANAGEMENT | Facility: OTHER | Age: 66
End: 2021-01-01

## 2021-01-01 ENCOUNTER — HOSPITAL ENCOUNTER (OUTPATIENT)
Dept: RADIATION ONCOLOGY | Facility: HOSPITAL | Age: 66
Discharge: HOME | End: 2021-11-03

## 2021-01-01 ENCOUNTER — HOSPITAL ENCOUNTER (OUTPATIENT)
Dept: ONCOLOGY | Facility: HOSPITAL | Age: 66
Setting detail: INFUSION SERIES
Discharge: HOME | End: 2021-06-08

## 2021-01-01 ENCOUNTER — APPOINTMENT (OUTPATIENT)
Dept: MRI IMAGING | Facility: HOSPITAL | Age: 66
End: 2021-01-01

## 2021-01-01 ENCOUNTER — MEDICATION THERAPY MANAGEMENT (OUTPATIENT)
Dept: PHARMACY | Facility: HOSPITAL | Age: 66
End: 2021-01-01

## 2021-01-01 ENCOUNTER — OFFICE VISIT (OUTPATIENT)
Dept: ONCOLOGY | Facility: CLINIC | Age: 66
End: 2021-01-01

## 2021-01-01 ENCOUNTER — HOSPITAL ENCOUNTER (OUTPATIENT)
Dept: ONCOLOGY | Facility: HOSPITAL | Age: 66
Setting detail: INFUSION SERIES
Discharge: HOME | End: 2021-09-20

## 2021-01-01 ENCOUNTER — HOSPITAL ENCOUNTER (OUTPATIENT)
Dept: ONCOLOGY | Facility: HOSPITAL | Age: 66
Setting detail: INFUSION SERIES
Discharge: HOME | End: 2021-12-02

## 2021-01-01 ENCOUNTER — HOSPITAL ENCOUNTER (OUTPATIENT)
Dept: RADIATION ONCOLOGY | Facility: HOSPITAL | Age: 66
Discharge: HOME | End: 2021-11-04

## 2021-01-01 ENCOUNTER — HOSPITAL ENCOUNTER (OUTPATIENT)
Dept: RADIATION ONCOLOGY | Facility: HOSPITAL | Age: 66
Discharge: HOME | End: 2021-11-01

## 2021-01-01 ENCOUNTER — HOSPITAL ENCOUNTER (OUTPATIENT)
Dept: ONCOLOGY | Facility: HOSPITAL | Age: 66
Setting detail: INFUSION SERIES
Discharge: HOME | End: 2021-02-25

## 2021-01-01 ENCOUNTER — HOSPITAL ENCOUNTER (OUTPATIENT)
Dept: RADIATION ONCOLOGY | Facility: HOSPITAL | Age: 66
Setting detail: RADIATION/ONCOLOGY SERIES
End: 2021-01-01

## 2021-01-01 ENCOUNTER — HOSPITAL ENCOUNTER (OUTPATIENT)
Dept: RADIATION ONCOLOGY | Facility: HOSPITAL | Age: 66
Discharge: HOME | End: 2021-11-10

## 2021-01-01 ENCOUNTER — HOSPITAL ENCOUNTER (OUTPATIENT)
Dept: ONCOLOGY | Facility: HOSPITAL | Age: 66
Setting detail: INFUSION SERIES
Discharge: HOME | End: 2021-07-06

## 2021-01-01 ENCOUNTER — OFFICE VISIT (OUTPATIENT)
Dept: PAIN MEDICINE | Facility: CLINIC | Age: 66
End: 2021-01-01

## 2021-01-01 ENCOUNTER — HOSPITAL ENCOUNTER (OUTPATIENT)
Dept: RADIATION ONCOLOGY | Facility: HOSPITAL | Age: 66
Discharge: HOME | End: 2021-11-12

## 2021-01-01 ENCOUNTER — HOSPITAL ENCOUNTER (OUTPATIENT)
Dept: ONCOLOGY | Facility: HOSPITAL | Age: 66
Setting detail: INFUSION SERIES
Discharge: HOME | End: 2021-07-13

## 2021-01-01 ENCOUNTER — HOSPITAL ENCOUNTER (OUTPATIENT)
Dept: RADIATION ONCOLOGY | Facility: HOSPITAL | Age: 66
Discharge: HOME | End: 2021-11-02

## 2021-01-01 ENCOUNTER — HOSPITAL ENCOUNTER (OUTPATIENT)
Dept: ONCOLOGY | Facility: HOSPITAL | Age: 66
Setting detail: INFUSION SERIES
End: 2021-01-01

## 2021-01-01 ENCOUNTER — HOSPITAL ENCOUNTER (OUTPATIENT)
Dept: CT IMAGING | Facility: HOSPITAL | Age: 66
Discharge: HOME | End: 2021-10-20
Admitting: INTERNAL MEDICINE

## 2021-01-01 ENCOUNTER — HOSPITAL ENCOUNTER (INPATIENT)
Facility: HOSPITAL | Age: 66
LOS: 5 days | Discharge: NURSING, REHAB OR LONG-TERM CARE | End: 2021-08-30
Attending: INTERNAL MEDICINE | Admitting: STUDENT IN AN ORGANIZED HEALTH CARE EDUCATION/TRAINING PROGRAM

## 2021-01-01 ENCOUNTER — HOSPITAL ENCOUNTER (OUTPATIENT)
Dept: ONCOLOGY | Facility: HOSPITAL | Age: 66
Setting detail: INFUSION SERIES
Discharge: HOME | End: 2021-09-17

## 2021-01-01 ENCOUNTER — RADIATION ONCOLOGY WEEKLY ASSESSMENT (OUTPATIENT)
Dept: RADIATION ONCOLOGY | Facility: HOSPITAL | Age: 66
End: 2021-01-01

## 2021-01-01 ENCOUNTER — ANESTHESIA EVENT (OUTPATIENT)
Dept: GASTROENTEROLOGY | Facility: HOSPITAL | Age: 66
End: 2021-01-01

## 2021-01-01 ENCOUNTER — HOSPITAL ENCOUNTER (OUTPATIENT)
Dept: INFUSION THERAPY | Facility: HOSPITAL | Age: 66
Setting detail: INFUSION SERIES
Discharge: HOME | End: 2021-02-25

## 2021-01-01 ENCOUNTER — APPOINTMENT (OUTPATIENT)
Dept: PHARMACY | Facility: HOSPITAL | Age: 66
End: 2021-01-01

## 2021-01-01 ENCOUNTER — HOSPITAL ENCOUNTER (OUTPATIENT)
Dept: RADIATION ONCOLOGY | Facility: HOSPITAL | Age: 66
Discharge: HOME | End: 2021-11-16

## 2021-01-01 ENCOUNTER — HOSPITAL ENCOUNTER (OUTPATIENT)
Dept: ONCOLOGY | Facility: HOSPITAL | Age: 66
Discharge: HOME | End: 2021-04-09

## 2021-01-01 ENCOUNTER — HOSPITAL ENCOUNTER (OUTPATIENT)
Dept: ONCOLOGY | Facility: HOSPITAL | Age: 66
Setting detail: INFUSION SERIES
Discharge: HOME | End: 2021-05-07

## 2021-01-01 ENCOUNTER — HOSPITAL ENCOUNTER (OUTPATIENT)
Dept: ONCOLOGY | Facility: HOSPITAL | Age: 66
Setting detail: INFUSION SERIES
Discharge: HOME | End: 2021-06-04

## 2021-01-01 ENCOUNTER — HOSPITAL ENCOUNTER (OUTPATIENT)
Dept: ONCOLOGY | Facility: HOSPITAL | Age: 66
Discharge: HOME | End: 2021-05-26

## 2021-01-01 ENCOUNTER — HOSPITAL ENCOUNTER (EMERGENCY)
Facility: HOSPITAL | Age: 66
Discharge: HOME | End: 2021-03-18
Attending: EMERGENCY MEDICINE | Admitting: EMERGENCY MEDICINE

## 2021-01-01 ENCOUNTER — ANESTHESIA (OUTPATIENT)
Dept: PERIOP | Facility: HOSPITAL | Age: 66
End: 2021-01-01

## 2021-01-01 ENCOUNTER — HOSPITAL ENCOUNTER (OUTPATIENT)
Dept: RADIATION ONCOLOGY | Facility: HOSPITAL | Age: 66
Discharge: HOME | End: 2021-11-11

## 2021-01-01 ENCOUNTER — HOSPITAL ENCOUNTER (OUTPATIENT)
Dept: ONCOLOGY | Facility: HOSPITAL | Age: 66
Setting detail: INFUSION SERIES
Discharge: HOME | End: 2021-02-26

## 2021-01-01 ENCOUNTER — HOSPITAL ENCOUNTER (OUTPATIENT)
Dept: MRI IMAGING | Facility: HOSPITAL | Age: 66
Discharge: HOME | End: 2021-07-06
Admitting: RADIOLOGY

## 2021-01-01 ENCOUNTER — HOSPITAL ENCOUNTER (OUTPATIENT)
Dept: ONCOLOGY | Facility: HOSPITAL | Age: 66
Setting detail: INFUSION SERIES
Discharge: HOME | End: 2021-06-01

## 2021-01-01 ENCOUNTER — HOSPITAL ENCOUNTER (OUTPATIENT)
Dept: PET IMAGING | Facility: HOSPITAL | Age: 66
Discharge: HOME | End: 2021-07-08
Admitting: RADIOLOGY

## 2021-01-01 ENCOUNTER — HOSPITAL ENCOUNTER (OUTPATIENT)
Dept: ONCOLOGY | Facility: HOSPITAL | Age: 66
Setting detail: INFUSION SERIES
Discharge: HOME | End: 2021-11-02

## 2021-01-01 ENCOUNTER — HOSPITAL ENCOUNTER (OUTPATIENT)
Dept: ONCOLOGY | Facility: HOSPITAL | Age: 66
Setting detail: INFUSION SERIES
Discharge: HOME | End: 2021-04-02

## 2021-01-01 ENCOUNTER — MEDICATION THERAPY MANAGEMENT (OUTPATIENT)
Dept: ONCOLOGY | Facility: HOSPITAL | Age: 66
End: 2021-01-01

## 2021-01-01 ENCOUNTER — HOSPITAL ENCOUNTER (OUTPATIENT)
Dept: ONCOLOGY | Facility: HOSPITAL | Age: 66
Setting detail: INFUSION SERIES
Discharge: HOME | End: 2021-03-12

## 2021-01-01 ENCOUNTER — HOSPITAL ENCOUNTER (OUTPATIENT)
Dept: PET IMAGING | Facility: HOSPITAL | Age: 66
Discharge: HOME | End: 2021-05-11
Admitting: INTERNAL MEDICINE

## 2021-01-01 ENCOUNTER — HOSPITAL ENCOUNTER (OUTPATIENT)
Dept: ONCOLOGY | Facility: HOSPITAL | Age: 66
Setting detail: INFUSION SERIES
Discharge: HOME | End: 2021-07-26

## 2021-01-01 ENCOUNTER — HOSPITAL ENCOUNTER (OUTPATIENT)
Dept: ONCOLOGY | Facility: HOSPITAL | Age: 66
Setting detail: INFUSION SERIES
Discharge: HOME | End: 2021-07-09

## 2021-01-01 ENCOUNTER — HOSPITAL ENCOUNTER (OUTPATIENT)
Dept: ONCOLOGY | Facility: HOSPITAL | Age: 66
Setting detail: INFUSION SERIES
Discharge: HOME | End: 2021-03-24

## 2021-01-01 ENCOUNTER — HOSPITAL ENCOUNTER (OUTPATIENT)
Dept: ONCOLOGY | Facility: HOSPITAL | Age: 66
Setting detail: INFUSION SERIES
Discharge: HOME | End: 2021-08-02

## 2021-01-01 ENCOUNTER — HOSPITAL ENCOUNTER (OUTPATIENT)
Dept: ONCOLOGY | Facility: HOSPITAL | Age: 66
Discharge: HOME | End: 2021-05-05
Admitting: INTERNAL MEDICINE

## 2021-01-01 ENCOUNTER — OFFICE VISIT (OUTPATIENT)
Dept: PULMONOLOGY | Facility: HOSPITAL | Age: 66
End: 2021-01-01

## 2021-01-01 ENCOUNTER — HOSPITAL ENCOUNTER (OUTPATIENT)
Dept: ONCOLOGY | Facility: HOSPITAL | Age: 66
Discharge: HOME | End: 2021-05-21

## 2021-01-01 ENCOUNTER — HOSPITAL ENCOUNTER (OUTPATIENT)
Dept: MRI IMAGING | Facility: HOSPITAL | Age: 66
Discharge: HOME | End: 2021-03-10
Admitting: RADIOLOGY

## 2021-01-01 ENCOUNTER — HOSPITAL ENCOUNTER (OUTPATIENT)
Dept: PET IMAGING | Facility: HOSPITAL | Age: 66
Discharge: HOME | End: 2021-05-06
Admitting: INTERNAL MEDICINE

## 2021-01-01 ENCOUNTER — APPOINTMENT (OUTPATIENT)
Dept: PET IMAGING | Facility: HOSPITAL | Age: 66
End: 2021-01-01

## 2021-01-01 ENCOUNTER — ANESTHESIA EVENT (OUTPATIENT)
Dept: PERIOP | Facility: HOSPITAL | Age: 66
End: 2021-01-01

## 2021-01-01 ENCOUNTER — HOSPITAL ENCOUNTER (OUTPATIENT)
Dept: ONCOLOGY | Facility: HOSPITAL | Age: 66
Setting detail: INFUSION SERIES
Discharge: HOME | End: 2021-03-04

## 2021-01-01 ENCOUNTER — HOSPITAL ENCOUNTER (OUTPATIENT)
Dept: RADIATION ONCOLOGY | Facility: HOSPITAL | Age: 66
Discharge: HOME | End: 2021-11-05

## 2021-01-01 ENCOUNTER — TELEPHONE (OUTPATIENT)
Dept: FAMILY MEDICINE CLINIC | Facility: CLINIC | Age: 66
End: 2021-01-01

## 2021-01-01 ENCOUNTER — TELEPHONE (OUTPATIENT)
Dept: SURGERY | Facility: CLINIC | Age: 66
End: 2021-01-01

## 2021-01-01 ENCOUNTER — HOSPITAL ENCOUNTER (OUTPATIENT)
Dept: ONCOLOGY | Facility: HOSPITAL | Age: 66
Discharge: HOME | End: 2021-05-12

## 2021-01-01 ENCOUNTER — HOSPITAL ENCOUNTER (OUTPATIENT)
Dept: ONCOLOGY | Facility: HOSPITAL | Age: 66
Setting detail: INFUSION SERIES
Discharge: HOME | End: 2021-08-09

## 2021-01-01 ENCOUNTER — HOSPITAL ENCOUNTER (OUTPATIENT)
Dept: RADIATION ONCOLOGY | Facility: HOSPITAL | Age: 66
Discharge: HOME | End: 2021-11-19

## 2021-01-01 ENCOUNTER — TELEMEDICINE (OUTPATIENT)
Dept: FAMILY MEDICINE CLINIC | Facility: CLINIC | Age: 66
End: 2021-01-01

## 2021-01-01 ENCOUNTER — HOSPITAL ENCOUNTER (OUTPATIENT)
Dept: ONCOLOGY | Facility: HOSPITAL | Age: 66
Setting detail: INFUSION SERIES
Discharge: HOME | End: 2021-07-16

## 2021-01-01 ENCOUNTER — HOSPITAL ENCOUNTER (OUTPATIENT)
Dept: ONCOLOGY | Facility: HOSPITAL | Age: 66
Discharge: HOME | End: 2021-04-20

## 2021-01-01 ENCOUNTER — HOSPITAL ENCOUNTER (OUTPATIENT)
Dept: ONCOLOGY | Facility: HOSPITAL | Age: 66
Discharge: HOME | End: 2021-04-30
Admitting: INTERNAL MEDICINE

## 2021-01-01 ENCOUNTER — HOSPITAL ENCOUNTER (OUTPATIENT)
Dept: RADIATION ONCOLOGY | Facility: HOSPITAL | Age: 66
Discharge: HOME | End: 2021-11-09

## 2021-01-01 VITALS
HEIGHT: 65 IN | SYSTOLIC BLOOD PRESSURE: 126 MMHG | RESPIRATION RATE: 18 BRPM | HEART RATE: 106 BPM | OXYGEN SATURATION: 93 % | TEMPERATURE: 98.6 F | BODY MASS INDEX: 30.12 KG/M2 | DIASTOLIC BLOOD PRESSURE: 76 MMHG | WEIGHT: 180.78 LBS

## 2021-01-01 VITALS
WEIGHT: 151 LBS | DIASTOLIC BLOOD PRESSURE: 93 MMHG | HEART RATE: 96 BPM | BODY MASS INDEX: 25.16 KG/M2 | RESPIRATION RATE: 18 BRPM | HEIGHT: 65 IN | OXYGEN SATURATION: 92 % | TEMPERATURE: 98 F | SYSTOLIC BLOOD PRESSURE: 157 MMHG

## 2021-01-01 VITALS
OXYGEN SATURATION: 97 % | RESPIRATION RATE: 18 BRPM | DIASTOLIC BLOOD PRESSURE: 67 MMHG | WEIGHT: 167.6 LBS | BODY MASS INDEX: 27.92 KG/M2 | SYSTOLIC BLOOD PRESSURE: 104 MMHG | TEMPERATURE: 98.2 F | HEART RATE: 80 BPM | HEIGHT: 65 IN

## 2021-01-01 VITALS
DIASTOLIC BLOOD PRESSURE: 71 MMHG | RESPIRATION RATE: 16 BRPM | HEART RATE: 61 BPM | TEMPERATURE: 98 F | SYSTOLIC BLOOD PRESSURE: 131 MMHG | OXYGEN SATURATION: 99 %

## 2021-01-01 VITALS
DIASTOLIC BLOOD PRESSURE: 79 MMHG | HEIGHT: 65 IN | RESPIRATION RATE: 18 BRPM | BODY MASS INDEX: 29.49 KG/M2 | HEART RATE: 63 BPM | WEIGHT: 177 LBS | SYSTOLIC BLOOD PRESSURE: 117 MMHG | TEMPERATURE: 97.5 F

## 2021-01-01 VITALS
HEIGHT: 65 IN | SYSTOLIC BLOOD PRESSURE: 133 MMHG | HEART RATE: 81 BPM | WEIGHT: 168 LBS | DIASTOLIC BLOOD PRESSURE: 82 MMHG | BODY MASS INDEX: 27.99 KG/M2 | TEMPERATURE: 97.1 F | RESPIRATION RATE: 15 BRPM

## 2021-01-01 VITALS
HEIGHT: 65 IN | DIASTOLIC BLOOD PRESSURE: 77 MMHG | TEMPERATURE: 96.8 F | BODY MASS INDEX: 29.79 KG/M2 | HEART RATE: 64 BPM | WEIGHT: 178.8 LBS | SYSTOLIC BLOOD PRESSURE: 134 MMHG | RESPIRATION RATE: 18 BRPM

## 2021-01-01 VITALS
RESPIRATION RATE: 18 BRPM | HEART RATE: 65 BPM | DIASTOLIC BLOOD PRESSURE: 77 MMHG | WEIGHT: 169 LBS | BODY MASS INDEX: 28.12 KG/M2 | SYSTOLIC BLOOD PRESSURE: 129 MMHG | OXYGEN SATURATION: 97 % | TEMPERATURE: 97.4 F

## 2021-01-01 VITALS
HEART RATE: 111 BPM | TEMPERATURE: 97.1 F | RESPIRATION RATE: 16 BRPM | SYSTOLIC BLOOD PRESSURE: 121 MMHG | DIASTOLIC BLOOD PRESSURE: 76 MMHG | HEIGHT: 65 IN | BODY MASS INDEX: 26.66 KG/M2 | DIASTOLIC BLOOD PRESSURE: 68 MMHG | HEIGHT: 65 IN | RESPIRATION RATE: 18 BRPM | OXYGEN SATURATION: 100 % | BODY MASS INDEX: 29.47 KG/M2 | HEART RATE: 93 BPM | WEIGHT: 160 LBS | SYSTOLIC BLOOD PRESSURE: 130 MMHG | WEIGHT: 176.9 LBS | TEMPERATURE: 98 F

## 2021-01-01 VITALS
RESPIRATION RATE: 18 BRPM | DIASTOLIC BLOOD PRESSURE: 83 MMHG | BODY MASS INDEX: 27.99 KG/M2 | HEIGHT: 65 IN | HEART RATE: 65 BPM | TEMPERATURE: 97.3 F | SYSTOLIC BLOOD PRESSURE: 123 MMHG | WEIGHT: 168 LBS

## 2021-01-01 VITALS
TEMPERATURE: 96.9 F | DIASTOLIC BLOOD PRESSURE: 69 MMHG | OXYGEN SATURATION: 95 % | WEIGHT: 177 LBS | RESPIRATION RATE: 18 BRPM | BODY MASS INDEX: 29.45 KG/M2 | SYSTOLIC BLOOD PRESSURE: 106 MMHG | HEART RATE: 75 BPM

## 2021-01-01 VITALS
HEIGHT: 65 IN | TEMPERATURE: 97.3 F | RESPIRATION RATE: 20 BRPM | OXYGEN SATURATION: 94 % | SYSTOLIC BLOOD PRESSURE: 130 MMHG | BODY MASS INDEX: 25.99 KG/M2 | HEART RATE: 79 BPM | DIASTOLIC BLOOD PRESSURE: 81 MMHG | WEIGHT: 156 LBS

## 2021-01-01 VITALS
BODY MASS INDEX: 28.16 KG/M2 | HEART RATE: 85 BPM | DIASTOLIC BLOOD PRESSURE: 81 MMHG | SYSTOLIC BLOOD PRESSURE: 123 MMHG | TEMPERATURE: 97.5 F | OXYGEN SATURATION: 97 % | HEIGHT: 65 IN | WEIGHT: 169 LBS

## 2021-01-01 VITALS
BODY MASS INDEX: 27.99 KG/M2 | HEIGHT: 65 IN | TEMPERATURE: 97.1 F | HEIGHT: 65 IN | HEART RATE: 80 BPM | WEIGHT: 168 LBS | SYSTOLIC BLOOD PRESSURE: 120 MMHG | DIASTOLIC BLOOD PRESSURE: 76 MMHG | WEIGHT: 179 LBS | RESPIRATION RATE: 24 BRPM | SYSTOLIC BLOOD PRESSURE: 118 MMHG | DIASTOLIC BLOOD PRESSURE: 75 MMHG | RESPIRATION RATE: 18 BRPM | OXYGEN SATURATION: 96 % | BODY MASS INDEX: 29.82 KG/M2 | HEART RATE: 76 BPM | TEMPERATURE: 97.5 F

## 2021-01-01 VITALS
WEIGHT: 154 LBS | TEMPERATURE: 97.4 F | DIASTOLIC BLOOD PRESSURE: 83 MMHG | HEART RATE: 93 BPM | HEIGHT: 65 IN | RESPIRATION RATE: 17 BRPM | SYSTOLIC BLOOD PRESSURE: 127 MMHG | OXYGEN SATURATION: 95 % | BODY MASS INDEX: 25.66 KG/M2

## 2021-01-01 VITALS
SYSTOLIC BLOOD PRESSURE: 90 MMHG | HEIGHT: 65 IN | RESPIRATION RATE: 16 BRPM | BODY MASS INDEX: 25.33 KG/M2 | OXYGEN SATURATION: 90 % | DIASTOLIC BLOOD PRESSURE: 55 MMHG | WEIGHT: 152 LBS | HEART RATE: 91 BPM

## 2021-01-01 VITALS
BODY MASS INDEX: 27.66 KG/M2 | HEART RATE: 69 BPM | TEMPERATURE: 97.1 F | SYSTOLIC BLOOD PRESSURE: 111 MMHG | DIASTOLIC BLOOD PRESSURE: 72 MMHG | RESPIRATION RATE: 24 BRPM | HEIGHT: 65 IN | WEIGHT: 166 LBS

## 2021-01-01 VITALS
WEIGHT: 176 LBS | DIASTOLIC BLOOD PRESSURE: 84 MMHG | HEART RATE: 67 BPM | TEMPERATURE: 97.3 F | SYSTOLIC BLOOD PRESSURE: 120 MMHG | OXYGEN SATURATION: 96 % | BODY MASS INDEX: 29.29 KG/M2

## 2021-01-01 VITALS
OXYGEN SATURATION: 100 % | TEMPERATURE: 98 F | WEIGHT: 180 LBS | HEART RATE: 70 BPM | RESPIRATION RATE: 18 BRPM | SYSTOLIC BLOOD PRESSURE: 121 MMHG | DIASTOLIC BLOOD PRESSURE: 72 MMHG | HEIGHT: 65 IN | BODY MASS INDEX: 29.99 KG/M2

## 2021-01-01 VITALS
TEMPERATURE: 97.7 F | DIASTOLIC BLOOD PRESSURE: 52 MMHG | HEIGHT: 65 IN | OXYGEN SATURATION: 100 % | WEIGHT: 155.87 LBS | BODY MASS INDEX: 25.97 KG/M2 | RESPIRATION RATE: 18 BRPM | SYSTOLIC BLOOD PRESSURE: 98 MMHG | HEART RATE: 58 BPM

## 2021-01-01 VITALS
BODY MASS INDEX: 29.99 KG/M2 | HEIGHT: 65 IN | RESPIRATION RATE: 18 BRPM | DIASTOLIC BLOOD PRESSURE: 83 MMHG | WEIGHT: 180 LBS | TEMPERATURE: 98.7 F | HEART RATE: 76 BPM | SYSTOLIC BLOOD PRESSURE: 128 MMHG

## 2021-01-01 VITALS
HEIGHT: 65 IN | DIASTOLIC BLOOD PRESSURE: 76 MMHG | BODY MASS INDEX: 29.82 KG/M2 | WEIGHT: 179 LBS | SYSTOLIC BLOOD PRESSURE: 120 MMHG

## 2021-01-01 VITALS
DIASTOLIC BLOOD PRESSURE: 77 MMHG | RESPIRATION RATE: 14 BRPM | SYSTOLIC BLOOD PRESSURE: 117 MMHG | WEIGHT: 180 LBS | OXYGEN SATURATION: 93 % | TEMPERATURE: 96.3 F | HEIGHT: 65 IN | BODY MASS INDEX: 29.99 KG/M2 | HEART RATE: 68 BPM

## 2021-01-01 VITALS
HEART RATE: 67 BPM | WEIGHT: 178.8 LBS | SYSTOLIC BLOOD PRESSURE: 143 MMHG | HEIGHT: 65 IN | TEMPERATURE: 97 F | BODY MASS INDEX: 29.79 KG/M2 | OXYGEN SATURATION: 95 % | DIASTOLIC BLOOD PRESSURE: 82 MMHG

## 2021-01-01 VITALS
HEIGHT: 65 IN | HEIGHT: 65 IN | DIASTOLIC BLOOD PRESSURE: 68 MMHG | TEMPERATURE: 98.2 F | SYSTOLIC BLOOD PRESSURE: 122 MMHG | DIASTOLIC BLOOD PRESSURE: 73 MMHG | WEIGHT: 178 LBS | BODY MASS INDEX: 27.49 KG/M2 | WEIGHT: 165 LBS | HEART RATE: 77 BPM | BODY MASS INDEX: 29.66 KG/M2 | SYSTOLIC BLOOD PRESSURE: 114 MMHG | RESPIRATION RATE: 21 BRPM

## 2021-01-01 VITALS
BODY MASS INDEX: 25.16 KG/M2 | RESPIRATION RATE: 18 BRPM | HEART RATE: 85 BPM | HEIGHT: 65 IN | OXYGEN SATURATION: 94 % | WEIGHT: 151 LBS | SYSTOLIC BLOOD PRESSURE: 119 MMHG | DIASTOLIC BLOOD PRESSURE: 68 MMHG | TEMPERATURE: 97.8 F

## 2021-01-01 VITALS
BODY MASS INDEX: 29.16 KG/M2 | DIASTOLIC BLOOD PRESSURE: 78 MMHG | WEIGHT: 175 LBS | HEIGHT: 65 IN | TEMPERATURE: 98.2 F | HEART RATE: 77 BPM | RESPIRATION RATE: 18 BRPM | SYSTOLIC BLOOD PRESSURE: 115 MMHG

## 2021-01-01 VITALS
SYSTOLIC BLOOD PRESSURE: 103 MMHG | WEIGHT: 157 LBS | DIASTOLIC BLOOD PRESSURE: 67 MMHG | HEIGHT: 65 IN | TEMPERATURE: 97.3 F | BODY MASS INDEX: 26.16 KG/M2 | OXYGEN SATURATION: 94 % | HEART RATE: 77 BPM | RESPIRATION RATE: 18 BRPM

## 2021-01-01 VITALS
HEART RATE: 70 BPM | HEIGHT: 65 IN | WEIGHT: 178.8 LBS | SYSTOLIC BLOOD PRESSURE: 146 MMHG | BODY MASS INDEX: 29.79 KG/M2 | DIASTOLIC BLOOD PRESSURE: 89 MMHG | TEMPERATURE: 97.5 F | RESPIRATION RATE: 16 BRPM

## 2021-01-01 VITALS
HEIGHT: 65 IN | BODY MASS INDEX: 26.33 KG/M2 | WEIGHT: 158 LBS | SYSTOLIC BLOOD PRESSURE: 145 MMHG | DIASTOLIC BLOOD PRESSURE: 88 MMHG | RESPIRATION RATE: 20 BRPM | HEART RATE: 85 BPM | TEMPERATURE: 97.3 F

## 2021-01-01 VITALS
WEIGHT: 165.4 LBS | DIASTOLIC BLOOD PRESSURE: 88 MMHG | BODY MASS INDEX: 27.56 KG/M2 | SYSTOLIC BLOOD PRESSURE: 134 MMHG | HEIGHT: 65 IN

## 2021-01-01 VITALS
SYSTOLIC BLOOD PRESSURE: 111 MMHG | WEIGHT: 180.6 LBS | BODY MASS INDEX: 30.09 KG/M2 | HEART RATE: 77 BPM | TEMPERATURE: 97.1 F | HEIGHT: 65 IN | DIASTOLIC BLOOD PRESSURE: 72 MMHG | RESPIRATION RATE: 18 BRPM

## 2021-01-01 VITALS
WEIGHT: 178 LBS | DIASTOLIC BLOOD PRESSURE: 83 MMHG | SYSTOLIC BLOOD PRESSURE: 127 MMHG | TEMPERATURE: 98.4 F | BODY MASS INDEX: 29.66 KG/M2 | HEIGHT: 65 IN | OXYGEN SATURATION: 96 % | RESPIRATION RATE: 16 BRPM | HEART RATE: 97 BPM

## 2021-01-01 VITALS
WEIGHT: 154 LBS | TEMPERATURE: 98.3 F | SYSTOLIC BLOOD PRESSURE: 136 MMHG | DIASTOLIC BLOOD PRESSURE: 81 MMHG | RESPIRATION RATE: 20 BRPM | OXYGEN SATURATION: 97 % | BODY MASS INDEX: 25.66 KG/M2 | HEIGHT: 65 IN | HEART RATE: 91 BPM

## 2021-01-01 VITALS
TEMPERATURE: 96.6 F | BODY MASS INDEX: 29.79 KG/M2 | HEART RATE: 94 BPM | WEIGHT: 179 LBS | DIASTOLIC BLOOD PRESSURE: 74 MMHG | OXYGEN SATURATION: 94 % | SYSTOLIC BLOOD PRESSURE: 122 MMHG

## 2021-01-01 VITALS
WEIGHT: 166 LBS | HEIGHT: 65 IN | DIASTOLIC BLOOD PRESSURE: 72 MMHG | HEART RATE: 69 BPM | BODY MASS INDEX: 27.66 KG/M2 | RESPIRATION RATE: 18 BRPM | SYSTOLIC BLOOD PRESSURE: 111 MMHG | TEMPERATURE: 97.1 F

## 2021-01-01 VITALS — HEART RATE: 106 BPM | RESPIRATION RATE: 4 BRPM | OXYGEN SATURATION: 97 %

## 2021-01-01 VITALS
DIASTOLIC BLOOD PRESSURE: 81 MMHG | HEART RATE: 71 BPM | HEIGHT: 65 IN | SYSTOLIC BLOOD PRESSURE: 129 MMHG | TEMPERATURE: 96.7 F | RESPIRATION RATE: 18 BRPM | OXYGEN SATURATION: 95 % | BODY MASS INDEX: 29.66 KG/M2 | WEIGHT: 178 LBS

## 2021-01-01 VITALS
BODY MASS INDEX: 29.66 KG/M2 | OXYGEN SATURATION: 95 % | HEART RATE: 78 BPM | RESPIRATION RATE: 24 BRPM | SYSTOLIC BLOOD PRESSURE: 119 MMHG | TEMPERATURE: 97.4 F | HEIGHT: 65 IN | DIASTOLIC BLOOD PRESSURE: 74 MMHG | WEIGHT: 178 LBS

## 2021-01-01 VITALS — HEIGHT: 65 IN | WEIGHT: 170 LBS | BODY MASS INDEX: 28.32 KG/M2

## 2021-01-01 VITALS
RESPIRATION RATE: 17 BRPM | BODY MASS INDEX: 28.66 KG/M2 | WEIGHT: 172 LBS | HEIGHT: 65 IN | SYSTOLIC BLOOD PRESSURE: 146 MMHG | DIASTOLIC BLOOD PRESSURE: 82 MMHG | TEMPERATURE: 98.2 F | OXYGEN SATURATION: 96 % | HEART RATE: 80 BPM

## 2021-01-01 DIAGNOSIS — Z79.01 LONG TERM (CURRENT) USE OF ANTICOAGULANTS: ICD-10-CM

## 2021-01-01 DIAGNOSIS — M54.2 NECK PAIN: ICD-10-CM

## 2021-01-01 DIAGNOSIS — T45.1X5A PANCYTOPENIA DUE TO ANTINEOPLASTIC CHEMOTHERAPY (HCC): Primary | ICD-10-CM

## 2021-01-01 DIAGNOSIS — D51.0 PERNICIOUS ANEMIA: Primary | ICD-10-CM

## 2021-01-01 DIAGNOSIS — Z79.899 OTHER LONG TERM (CURRENT) DRUG THERAPY: ICD-10-CM

## 2021-01-01 DIAGNOSIS — I31.39 PERICARDIAL EFFUSION: Primary | ICD-10-CM

## 2021-01-01 DIAGNOSIS — R19.00 ABDOMINAL MASS, UNSPECIFIED ABDOMINAL LOCATION: Primary | ICD-10-CM

## 2021-01-01 DIAGNOSIS — C79.51 BONE METASTASES: ICD-10-CM

## 2021-01-01 DIAGNOSIS — T45.511A POISONING BY WARFARIN SODIUM, ACCIDENTAL OR UNINTENTIONAL, INITIAL ENCOUNTER: ICD-10-CM

## 2021-01-01 DIAGNOSIS — C56.1 MALIGNANT NEOPLASM OF RIGHT OVARY (HCC): ICD-10-CM

## 2021-01-01 DIAGNOSIS — C79.31 BRAIN METASTASES: Primary | ICD-10-CM

## 2021-01-01 DIAGNOSIS — T45.1X5A PANCYTOPENIA DUE TO ANTINEOPLASTIC CHEMOTHERAPY (HCC): ICD-10-CM

## 2021-01-01 DIAGNOSIS — R06.02 SHORTNESS OF BREATH: ICD-10-CM

## 2021-01-01 DIAGNOSIS — D51.0 PERNICIOUS ANEMIA: ICD-10-CM

## 2021-01-01 DIAGNOSIS — M50.10 CERVICAL DISC DISORDER WITH RADICULOPATHY: ICD-10-CM

## 2021-01-01 DIAGNOSIS — C56.1 MALIGNANT NEOPLASM OF RIGHT OVARY (HCC): Chronic | ICD-10-CM

## 2021-01-01 DIAGNOSIS — Z95.828 PORT-A-CATH IN PLACE: Primary | ICD-10-CM

## 2021-01-01 DIAGNOSIS — M79.7 FIBROMYALGIA: ICD-10-CM

## 2021-01-01 DIAGNOSIS — K90.9 MALABSORPTION OF IRON: ICD-10-CM

## 2021-01-01 DIAGNOSIS — R05.9 COUGH: ICD-10-CM

## 2021-01-01 DIAGNOSIS — R79.0 LOW SERUM PHOSPHORUS FOR AGE: Primary | ICD-10-CM

## 2021-01-01 DIAGNOSIS — Z95.828 PORT-A-CATH IN PLACE: ICD-10-CM

## 2021-01-01 DIAGNOSIS — D61.810 PANCYTOPENIA DUE TO ANTINEOPLASTIC CHEMOTHERAPY (HCC): ICD-10-CM

## 2021-01-01 DIAGNOSIS — C56.1 MALIGNANT NEOPLASM OF RIGHT OVARY (HCC): Primary | ICD-10-CM

## 2021-01-01 DIAGNOSIS — J45.909 ASTHMA, UNSPECIFIED ASTHMA SEVERITY, UNSPECIFIED WHETHER COMPLICATED, UNSPECIFIED WHETHER PERSISTENT: ICD-10-CM

## 2021-01-01 DIAGNOSIS — E83.39 HYPOPHOSPHATEMIA: Primary | ICD-10-CM

## 2021-01-01 DIAGNOSIS — Z79.01 LONG TERM (CURRENT) USE OF ANTICOAGULANTS: Primary | ICD-10-CM

## 2021-01-01 DIAGNOSIS — R07.89 CHEST WALL PAIN: Primary | ICD-10-CM

## 2021-01-01 DIAGNOSIS — D50.8 OTHER IRON DEFICIENCY ANEMIA: ICD-10-CM

## 2021-01-01 DIAGNOSIS — C79.51 BONE METASTASES: Primary | ICD-10-CM

## 2021-01-01 DIAGNOSIS — Z51.81 ENCOUNTER FOR MONITORING CARDIOTOXIC DRUG THERAPY: Primary | ICD-10-CM

## 2021-01-01 DIAGNOSIS — E87.6 HYPOKALEMIA: Primary | ICD-10-CM

## 2021-01-01 DIAGNOSIS — C56.1 MALIGNANT NEOPLASM OF RIGHT OVARY (HCC): Primary | Chronic | ICD-10-CM

## 2021-01-01 DIAGNOSIS — E83.51 HYPOCALCEMIA: ICD-10-CM

## 2021-01-01 DIAGNOSIS — T45.1X5A ANTINEOPLASTIC CHEMOTHERAPY INDUCED ANEMIA: Primary | ICD-10-CM

## 2021-01-01 DIAGNOSIS — M79.7 FIBROMYOSITIS: ICD-10-CM

## 2021-01-01 DIAGNOSIS — C79.31 BRAIN METASTASES: ICD-10-CM

## 2021-01-01 DIAGNOSIS — M79.604 LEG PAIN, BILATERAL: ICD-10-CM

## 2021-01-01 DIAGNOSIS — M79.605 LEG PAIN, BILATERAL: ICD-10-CM

## 2021-01-01 DIAGNOSIS — J18.9 PNEUMONIA OF RIGHT LOWER LOBE DUE TO INFECTIOUS ORGANISM: Primary | ICD-10-CM

## 2021-01-01 DIAGNOSIS — C79.31 BRAIN METASTASES: Chronic | ICD-10-CM

## 2021-01-01 DIAGNOSIS — G89.3 PAIN OF METASTATIC MALIGNANCY: Primary | ICD-10-CM

## 2021-01-01 DIAGNOSIS — E83.39 HYPOPHOSPHATEMIA: ICD-10-CM

## 2021-01-01 DIAGNOSIS — E87.6 HYPOKALEMIA: ICD-10-CM

## 2021-01-01 DIAGNOSIS — R79.1 ELEVATED INR: ICD-10-CM

## 2021-01-01 DIAGNOSIS — C50.411 MALIGNANT NEOPLASM OF UPPER-OUTER QUADRANT OF RIGHT FEMALE BREAST, UNSPECIFIED ESTROGEN RECEPTOR STATUS (HCC): Primary | ICD-10-CM

## 2021-01-01 DIAGNOSIS — D64.81 ANTINEOPLASTIC CHEMOTHERAPY INDUCED ANEMIA: ICD-10-CM

## 2021-01-01 DIAGNOSIS — D61.810 PANCYTOPENIA DUE TO ANTINEOPLASTIC CHEMOTHERAPY (HCC): Primary | ICD-10-CM

## 2021-01-01 DIAGNOSIS — E83.42 HYPOMAGNESEMIA: Primary | ICD-10-CM

## 2021-01-01 DIAGNOSIS — R05.9 COUGH: Primary | ICD-10-CM

## 2021-01-01 DIAGNOSIS — D50.8 OTHER IRON DEFICIENCY ANEMIA: Primary | ICD-10-CM

## 2021-01-01 DIAGNOSIS — D64.81 ANTINEOPLASTIC CHEMOTHERAPY INDUCED ANEMIA: Primary | ICD-10-CM

## 2021-01-01 DIAGNOSIS — G25.81 RESTLESS LEGS SYNDROME: ICD-10-CM

## 2021-01-01 DIAGNOSIS — C79.51 BONE METASTASES: Chronic | ICD-10-CM

## 2021-01-01 DIAGNOSIS — M47.812 OSTEOARTHRITIS OF CERVICAL SPINE WITHOUT MYELOPATHY: ICD-10-CM

## 2021-01-01 DIAGNOSIS — J40 BRONCHITIS: ICD-10-CM

## 2021-01-01 DIAGNOSIS — R97.8 OTHER ABNORMAL TUMOR MARKERS: ICD-10-CM

## 2021-01-01 DIAGNOSIS — J45.909 ASTHMA, UNSPECIFIED ASTHMA SEVERITY, UNSPECIFIED WHETHER COMPLICATED, UNSPECIFIED WHETHER PERSISTENT: Primary | ICD-10-CM

## 2021-01-01 DIAGNOSIS — E83.42 HYPOMAGNESEMIA: ICD-10-CM

## 2021-01-01 DIAGNOSIS — C56.9 MALIGNANT NEOPLASM OF OVARY, UNSPECIFIED LATERALITY (HCC): ICD-10-CM

## 2021-01-01 DIAGNOSIS — E83.51 HYPOCALCEMIA: Primary | ICD-10-CM

## 2021-01-01 DIAGNOSIS — T45.1X5A ANTINEOPLASTIC CHEMOTHERAPY INDUCED ANEMIA: ICD-10-CM

## 2021-01-01 DIAGNOSIS — M79.7 FIBROMYOSITIS: Primary | ICD-10-CM

## 2021-01-01 DIAGNOSIS — I82.722 CHRONIC DEEP VEIN THROMBOSIS (DVT) OF LEFT UPPER EXTREMITY, UNSPECIFIED VEIN (HCC): ICD-10-CM

## 2021-01-01 DIAGNOSIS — R79.1 ELEVATED INR: Primary | ICD-10-CM

## 2021-01-01 DIAGNOSIS — Z79.899 ENCOUNTER FOR MONITORING CARDIOTOXIC DRUG THERAPY: Primary | ICD-10-CM

## 2021-01-01 DIAGNOSIS — R06.00 DYSPNEA, UNSPECIFIED TYPE: ICD-10-CM

## 2021-01-01 DIAGNOSIS — R79.0 LOW SERUM PHOSPHORUS FOR AGE: ICD-10-CM

## 2021-01-01 DIAGNOSIS — R19.7 DIARRHEA OF PRESUMED INFECTIOUS ORIGIN: ICD-10-CM

## 2021-01-01 DIAGNOSIS — J18.9 PNEUMONIA OF BOTH UPPER LOBES DUE TO INFECTIOUS ORGANISM: Primary | ICD-10-CM

## 2021-01-01 DIAGNOSIS — R52 PAIN: ICD-10-CM

## 2021-01-01 DIAGNOSIS — R19.00 ABDOMINAL MASS, UNSPECIFIED ABDOMINAL LOCATION: ICD-10-CM

## 2021-01-01 DIAGNOSIS — U07.1 PNEUMONIA DUE TO COVID-19 VIRUS: Primary | ICD-10-CM

## 2021-01-01 DIAGNOSIS — J18.9 PNEUMONIA DUE TO INFECTIOUS ORGANISM, UNSPECIFIED LATERALITY, UNSPECIFIED PART OF LUNG: Primary | ICD-10-CM

## 2021-01-01 DIAGNOSIS — G89.3 PAIN OF METASTATIC MALIGNANCY: ICD-10-CM

## 2021-01-01 DIAGNOSIS — J12.82 PNEUMONIA DUE TO COVID-19 VIRUS: Primary | ICD-10-CM

## 2021-01-01 DIAGNOSIS — I10 ESSENTIAL HYPERTENSION: Chronic | ICD-10-CM

## 2021-01-01 DIAGNOSIS — Z45.2 ENCOUNTER FOR CARE RELATED TO VASCULAR ACCESS PORT: Primary | ICD-10-CM

## 2021-01-01 DIAGNOSIS — R93.0 ABNORMAL HEAD CT: ICD-10-CM

## 2021-01-01 DIAGNOSIS — I82.722 CHRONIC DEEP VEIN THROMBOSIS (DVT) OF LEFT UPPER EXTREMITY, UNSPECIFIED VEIN (HCC): Primary | ICD-10-CM

## 2021-01-01 DIAGNOSIS — R30.0 DYSURIA: ICD-10-CM

## 2021-01-01 DIAGNOSIS — G89.29 CHRONIC NONINTRACTABLE HEADACHE, UNSPECIFIED HEADACHE TYPE: ICD-10-CM

## 2021-01-01 DIAGNOSIS — R41.82 ALTERED MENTAL STATUS, UNSPECIFIED ALTERED MENTAL STATUS TYPE: ICD-10-CM

## 2021-01-01 DIAGNOSIS — R07.9 CHEST PAIN, UNSPECIFIED TYPE: Primary | ICD-10-CM

## 2021-01-01 DIAGNOSIS — R51.9 CHRONIC NONINTRACTABLE HEADACHE, UNSPECIFIED HEADACHE TYPE: ICD-10-CM

## 2021-01-01 DIAGNOSIS — I31.39 PERICARDIAL EFFUSION: ICD-10-CM

## 2021-01-01 DIAGNOSIS — T17.500A MUCUS PLUGGING OF BRONCHI: ICD-10-CM

## 2021-01-01 DIAGNOSIS — G25.81 RESTLESS LEG SYNDROME: ICD-10-CM

## 2021-01-01 LAB
ALBUMIN SERPL-MCNC: 2.3 G/DL (ref 3.5–5.2)
ALBUMIN SERPL-MCNC: 2.4 G/DL (ref 3.5–5.2)
ALBUMIN SERPL-MCNC: 2.6 G/DL (ref 3.5–5.2)
ALBUMIN SERPL-MCNC: 2.6 G/DL (ref 3.5–5.2)
ALBUMIN SERPL-MCNC: 2.7 G/DL (ref 3.5–5.2)
ALBUMIN SERPL-MCNC: 2.9 G/DL (ref 3.5–5.2)
ALBUMIN SERPL-MCNC: 3 G/DL (ref 3.5–5.2)
ALBUMIN SERPL-MCNC: 3 G/DL (ref 3.5–5.2)
ALBUMIN SERPL-MCNC: 3.1 G/DL (ref 3.5–5.2)
ALBUMIN SERPL-MCNC: 3.2 G/DL (ref 3.5–5.2)
ALBUMIN SERPL-MCNC: 3.2 G/DL (ref 3.5–5.2)
ALBUMIN SERPL-MCNC: 3.3 G/DL (ref 3.5–5.2)
ALBUMIN SERPL-MCNC: 3.4 G/DL (ref 3.5–5.2)
ALBUMIN SERPL-MCNC: 3.5 G/DL (ref 3.5–5.2)
ALBUMIN SERPL-MCNC: 3.6 G/DL (ref 3.5–5.2)
ALBUMIN SERPL-MCNC: 3.7 G/DL (ref 3.5–5.2)
ALBUMIN SERPL-MCNC: 3.7 G/DL (ref 3.5–5.2)
ALBUMIN SERPL-MCNC: 3.8 G/DL (ref 3.5–5.2)
ALBUMIN SERPL-MCNC: 3.8 G/DL (ref 3.5–5.2)
ALBUMIN/GLOB SERPL: 0.6 G/DL
ALBUMIN/GLOB SERPL: 0.6 G/DL
ALBUMIN/GLOB SERPL: 0.8 G/DL
ALBUMIN/GLOB SERPL: 1 G/DL
ALBUMIN/GLOB SERPL: 1.1 G/DL
ALBUMIN/GLOB SERPL: 1.2 G/DL
ALBUMIN/GLOB SERPL: 1.3 G/DL
ALP SERPL-CCNC: 121 U/L (ref 39–117)
ALP SERPL-CCNC: 137 U/L (ref 39–117)
ALP SERPL-CCNC: 142 U/L (ref 39–117)
ALP SERPL-CCNC: 151 U/L (ref 39–117)
ALP SERPL-CCNC: 157 U/L (ref 39–117)
ALP SERPL-CCNC: 157 U/L (ref 39–117)
ALP SERPL-CCNC: 158 U/L (ref 39–117)
ALP SERPL-CCNC: 159 U/L (ref 39–117)
ALP SERPL-CCNC: 162 U/L (ref 39–117)
ALP SERPL-CCNC: 167 U/L (ref 39–117)
ALP SERPL-CCNC: 171 U/L (ref 39–117)
ALP SERPL-CCNC: 172 U/L (ref 39–117)
ALP SERPL-CCNC: 173 U/L (ref 39–117)
ALP SERPL-CCNC: 177 U/L (ref 39–117)
ALP SERPL-CCNC: 178 U/L (ref 39–117)
ALP SERPL-CCNC: 181 U/L (ref 39–117)
ALP SERPL-CCNC: 182 U/L (ref 39–117)
ALP SERPL-CCNC: 186 U/L (ref 39–117)
ALP SERPL-CCNC: 187 U/L (ref 39–117)
ALP SERPL-CCNC: 189 U/L (ref 39–117)
ALP SERPL-CCNC: 209 U/L (ref 39–117)
ALP SERPL-CCNC: 76 U/L (ref 39–117)
ALP SERPL-CCNC: 78 U/L (ref 39–117)
ALP SERPL-CCNC: 83 U/L (ref 39–117)
ALP SERPL-CCNC: 84 U/L (ref 39–117)
ALP SERPL-CCNC: 88 U/L (ref 39–117)
ALP SERPL-CCNC: 90 U/L (ref 39–117)
ALP SERPL-CCNC: 95 U/L (ref 39–117)
ALT SERPL W P-5'-P-CCNC: 10 U/L (ref 1–33)
ALT SERPL W P-5'-P-CCNC: 11 U/L (ref 1–33)
ALT SERPL W P-5'-P-CCNC: 11 U/L (ref 1–33)
ALT SERPL W P-5'-P-CCNC: 13 U/L (ref 1–33)
ALT SERPL W P-5'-P-CCNC: 13 U/L (ref 1–33)
ALT SERPL W P-5'-P-CCNC: 14 U/L (ref 1–33)
ALT SERPL W P-5'-P-CCNC: 15 U/L (ref 1–33)
ALT SERPL W P-5'-P-CCNC: 5 U/L (ref 1–33)
ALT SERPL W P-5'-P-CCNC: 5 U/L (ref 1–33)
ALT SERPL W P-5'-P-CCNC: 6 U/L (ref 1–33)
ALT SERPL W P-5'-P-CCNC: 7 U/L (ref 1–33)
ALT SERPL W P-5'-P-CCNC: 8 U/L (ref 1–33)
ALT SERPL W P-5'-P-CCNC: 8 U/L (ref 1–33)
ALT SERPL W P-5'-P-CCNC: 9 U/L (ref 1–33)
ALT SERPL W P-5'-P-CCNC: <5 U/L (ref 1–33)
ANION GAP SERPL CALCULATED.3IONS-SCNC: 10 MMOL/L (ref 5–15)
ANION GAP SERPL CALCULATED.3IONS-SCNC: 11 MMOL/L (ref 5–15)
ANION GAP SERPL CALCULATED.3IONS-SCNC: 12 MMOL/L (ref 5–15)
ANION GAP SERPL CALCULATED.3IONS-SCNC: 13 MMOL/L (ref 5–15)
ANION GAP SERPL CALCULATED.3IONS-SCNC: 14 MMOL/L (ref 5–15)
ANION GAP SERPL CALCULATED.3IONS-SCNC: 16 MMOL/L (ref 5–15)
ANION GAP SERPL CALCULATED.3IONS-SCNC: 17 MMOL/L (ref 5–15)
ANION GAP SERPL CALCULATED.3IONS-SCNC: 18 MMOL/L (ref 5–15)
ANION GAP SERPL CALCULATED.3IONS-SCNC: 9 MMOL/L (ref 5–15)
ANISOCYTOSIS BLD QL: ABNORMAL
APTT PPP: 106.4 SECONDS (ref 24–31)
APTT PPP: 54.8 SECONDS (ref 24–31)
ARTERIAL PATENCY WRIST A: POSITIVE
AST SERPL-CCNC: 12 U/L (ref 1–32)
AST SERPL-CCNC: 14 U/L (ref 1–32)
AST SERPL-CCNC: 15 U/L (ref 1–32)
AST SERPL-CCNC: 15 U/L (ref 1–32)
AST SERPL-CCNC: 16 U/L (ref 1–32)
AST SERPL-CCNC: 17 U/L (ref 1–32)
AST SERPL-CCNC: 17 U/L (ref 1–32)
AST SERPL-CCNC: 18 U/L (ref 1–32)
AST SERPL-CCNC: 19 U/L (ref 1–32)
AST SERPL-CCNC: 20 U/L (ref 1–32)
AST SERPL-CCNC: 20 U/L (ref 1–32)
AST SERPL-CCNC: 21 U/L (ref 1–32)
AST SERPL-CCNC: 22 U/L (ref 1–32)
AST SERPL-CCNC: 23 U/L (ref 1–32)
AST SERPL-CCNC: 23 U/L (ref 1–32)
AST SERPL-CCNC: 25 U/L (ref 1–32)
AST SERPL-CCNC: 25 U/L (ref 1–32)
AST SERPL-CCNC: 26 U/L (ref 1–32)
AST SERPL-CCNC: 28 U/L (ref 1–32)
AST SERPL-CCNC: 28 U/L (ref 1–32)
AST SERPL-CCNC: 29 U/L (ref 1–32)
AST SERPL-CCNC: 43 U/L (ref 1–32)
AST SERPL-CCNC: 47 U/L (ref 1–32)
AST SERPL-CCNC: 52 U/L (ref 1–32)
AST SERPL-CCNC: 60 U/L (ref 1–32)
ATMOSPHERIC PRESS: ABNORMAL MM[HG]
B PARAPERT DNA SPEC QL NAA+PROBE: NOT DETECTED
B PERT DNA SPEC QL NAA+PROBE: NOT DETECTED
BACTERIA BLD CULT: ABNORMAL
BACTERIA SPEC AEROBE CULT: ABNORMAL
BACTERIA SPEC AEROBE CULT: ABNORMAL
BACTERIA SPEC AEROBE CULT: NO GROWTH
BACTERIA SPEC AEROBE CULT: NORMAL
BACTERIA SPEC ANAEROBE CULT: NORMAL
BACTERIA UR QL AUTO: ABNORMAL /HPF
BASE EXCESS BLDA CALC-SCNC: -3.2 MMOL/L (ref 0–3)
BASOPHILS # BLD AUTO: 0 10*3/MM3 (ref 0–0.2)
BASOPHILS # BLD AUTO: 0.01 10*3/MM3 (ref 0–0.2)
BASOPHILS # BLD AUTO: 0.02 10*3/MM3 (ref 0–0.2)
BASOPHILS # BLD AUTO: 0.03 10*3/MM3 (ref 0–0.2)
BASOPHILS # BLD AUTO: 0.04 10*3/MM3 (ref 0–0.2)
BASOPHILS # BLD AUTO: 0.04 10*3/MM3 (ref 0–0.2)
BASOPHILS # BLD AUTO: 0.1 10*3/MM3 (ref 0–0.2)
BASOPHILS NFR BLD AUTO: 0 % (ref 0–1.5)
BASOPHILS NFR BLD AUTO: 0 % (ref 0–1.5)
BASOPHILS NFR BLD AUTO: 0.1 % (ref 0–1.5)
BASOPHILS NFR BLD AUTO: 0.2 % (ref 0–1.5)
BASOPHILS NFR BLD AUTO: 0.3 % (ref 0–1.5)
BASOPHILS NFR BLD AUTO: 0.4 % (ref 0–1.5)
BASOPHILS NFR BLD AUTO: 0.5 % (ref 0–1.5)
BASOPHILS NFR BLD AUTO: 0.6 % (ref 0–1.5)
BASOPHILS NFR BLD AUTO: 0.7 % (ref 0–1.5)
BASOPHILS NFR BLD AUTO: 0.7 % (ref 0–1.5)
BASOPHILS NFR BLD AUTO: 0.8 % (ref 0–1.5)
BASOPHILS NFR BLD AUTO: 0.9 % (ref 0–1.5)
BASOPHILS NFR BLD AUTO: 0.9 % (ref 0–1.5)
BDY SITE: ABNORMAL
BH BB BLOOD EXPIRATION DATE: NORMAL
BH BB BLOOD EXPIRATION DATE: NORMAL
BH BB BLOOD TYPE BARCODE: 5100
BH BB BLOOD TYPE BARCODE: 5100
BH BB DISPENSE STATUS: NORMAL
BH BB DISPENSE STATUS: NORMAL
BH BB PRODUCT CODE: NORMAL
BH BB PRODUCT CODE: NORMAL
BH BB UNIT NUMBER: NORMAL
BH BB UNIT NUMBER: NORMAL
BH CV ECHO MEAS - % IVS THICK: 78.4 %
BH CV ECHO MEAS - % LVPW THICK: 80.2 %
BH CV ECHO MEAS - ACS: 1.9 CM
BH CV ECHO MEAS - AI DEC SLOPE: 109.7 CM/SEC^2
BH CV ECHO MEAS - AI DEC TIME: 1.9 SEC
BH CV ECHO MEAS - AI MAX PG: 17 MMHG
BH CV ECHO MEAS - AI MAX VEL: 206.4 CM/SEC
BH CV ECHO MEAS - AI P1/2T: 551.2 MSEC
BH CV ECHO MEAS - AO MAX PG (FULL): 3.2 MMHG
BH CV ECHO MEAS - AO MAX PG (FULL): 5.6 MMHG
BH CV ECHO MEAS - AO MAX PG: 10.3 MMHG
BH CV ECHO MEAS - AO MAX PG: 6.6 MMHG
BH CV ECHO MEAS - AO MEAN PG (FULL): 2.2 MMHG
BH CV ECHO MEAS - AO MEAN PG (FULL): 2.8 MMHG
BH CV ECHO MEAS - AO MEAN PG: 4.1 MMHG
BH CV ECHO MEAS - AO MEAN PG: 5.6 MMHG
BH CV ECHO MEAS - AO ROOT AREA (BSA CORRECTED): 1.5
BH CV ECHO MEAS - AO ROOT AREA (BSA CORRECTED): 1.8
BH CV ECHO MEAS - AO ROOT AREA: 6.3 CM^2
BH CV ECHO MEAS - AO ROOT AREA: 9.4 CM^2
BH CV ECHO MEAS - AO ROOT DIAM: 2.8 CM
BH CV ECHO MEAS - AO ROOT DIAM: 3.5 CM
BH CV ECHO MEAS - AO V2 MAX: 128.4 CM/SEC
BH CV ECHO MEAS - AO V2 MAX: 160.3 CM/SEC
BH CV ECHO MEAS - AO V2 MEAN: 113.7 CM/SEC
BH CV ECHO MEAS - AO V2 MEAN: 97.9 CM/SEC
BH CV ECHO MEAS - AO V2 VTI: 27.1 CM
BH CV ECHO MEAS - AO V2 VTI: 35.4 CM
BH CV ECHO MEAS - ASC AORTA: 3.4 CM
BH CV ECHO MEAS - ASC AORTA: 3.5 CM
BH CV ECHO MEAS - AVA(I,A): 2.2 CM^2
BH CV ECHO MEAS - AVA(I,A): 2.4 CM^2
BH CV ECHO MEAS - AVA(I,D): 2.2 CM^2
BH CV ECHO MEAS - AVA(I,D): 2.4 CM^2
BH CV ECHO MEAS - AVA(V,A): 2.4 CM^2
BH CV ECHO MEAS - AVA(V,A): 2.4 CM^2
BH CV ECHO MEAS - AVA(V,D): 2.4 CM^2
BH CV ECHO MEAS - AVA(V,D): 2.4 CM^2
BH CV ECHO MEAS - BSA(HAYCOCK): 1.9 M^2
BH CV ECHO MEAS - BSA(HAYCOCK): 2 M^2
BH CV ECHO MEAS - BSA: 1.9 M^2
BH CV ECHO MEAS - BSA: 1.9 M^2
BH CV ECHO MEAS - BZI_BMI: 29.1 KILOGRAMS/M^2
BH CV ECHO MEAS - BZI_BMI: 29.8 KILOGRAMS/M^2
BH CV ECHO MEAS - BZI_METRIC_HEIGHT: 165.1 CM
BH CV ECHO MEAS - BZI_METRIC_HEIGHT: 165.1 CM
BH CV ECHO MEAS - BZI_METRIC_WEIGHT: 79.4 KG
BH CV ECHO MEAS - BZI_METRIC_WEIGHT: 81.2 KG
BH CV ECHO MEAS - EDV(CUBED): 175.7 ML
BH CV ECHO MEAS - EDV(CUBED): 93.3 ML
BH CV ECHO MEAS - EDV(MOD-SP4): 60.2 ML
BH CV ECHO MEAS - EDV(MOD-SP4): 60.7 ML
BH CV ECHO MEAS - EDV(TEICH): 153.7 ML
BH CV ECHO MEAS - EDV(TEICH): 94.2 ML
BH CV ECHO MEAS - EF(CUBED): 84.2 %
BH CV ECHO MEAS - EF(CUBED): 89.1 %
BH CV ECHO MEAS - EF(MOD-BP): 55 %
BH CV ECHO MEAS - EF(MOD-SP4): 54.8 %
BH CV ECHO MEAS - EF(MOD-SP4): 67.4 %
BH CV ECHO MEAS - EF(TEICH): 76.7 %
BH CV ECHO MEAS - EF(TEICH): 83.4 %
BH CV ECHO MEAS - ESV(CUBED): 10.2 ML
BH CV ECHO MEAS - ESV(CUBED): 27.7 ML
BH CV ECHO MEAS - ESV(MOD-SP4): 19.8 ML
BH CV ECHO MEAS - ESV(MOD-SP4): 27.2 ML
BH CV ECHO MEAS - ESV(TEICH): 15.6 ML
BH CV ECHO MEAS - ESV(TEICH): 35.7 ML
BH CV ECHO MEAS - FS: 46 %
BH CV ECHO MEAS - FS: 52.2 %
BH CV ECHO MEAS - IVS/LVPW: 0.87
BH CV ECHO MEAS - IVS/LVPW: 0.95
BH CV ECHO MEAS - IVSD: 0.69 CM
BH CV ECHO MEAS - IVSD: 0.99 CM
BH CV ECHO MEAS - IVSS: 1.2 CM
BH CV ECHO MEAS - LA DIMENSION(2D): 3.8 CM
BH CV ECHO MEAS - LA DIMENSION: 3.5 CM
BH CV ECHO MEAS - LA/AO: 1
BH CV ECHO MEAS - LV DIASTOLIC VOL/BSA (35-75): 32.2 ML/M^2
BH CV ECHO MEAS - LV DIASTOLIC VOL/BSA (35-75): 32.2 ML/M^2
BH CV ECHO MEAS - LV MASS(C)D: 151.1 GRAMS
BH CV ECHO MEAS - LV MASS(C)D: 157.9 GRAMS
BH CV ECHO MEAS - LV MASS(C)DI: 80.8 GRAMS/M^2
BH CV ECHO MEAS - LV MASS(C)DI: 83.7 GRAMS/M^2
BH CV ECHO MEAS - LV MASS(C)S: 131.6 GRAMS
BH CV ECHO MEAS - LV MASS(C)SI: 70.4 GRAMS/M^2
BH CV ECHO MEAS - LV MAX PG: 3.4 MMHG
BH CV ECHO MEAS - LV MAX PG: 4.7 MMHG
BH CV ECHO MEAS - LV MEAN PG: 1.9 MMHG
BH CV ECHO MEAS - LV MEAN PG: 2.9 MMHG
BH CV ECHO MEAS - LV SYSTOLIC VOL/BSA (12-30): 10.5 ML/M^2
BH CV ECHO MEAS - LV SYSTOLIC VOL/BSA (12-30): 14.6 ML/M^2
BH CV ECHO MEAS - LV V1 MAX: 108.4 CM/SEC
BH CV ECHO MEAS - LV V1 MAX: 92.2 CM/SEC
BH CV ECHO MEAS - LV V1 MEAN: 65.1 CM/SEC
BH CV ECHO MEAS - LV V1 MEAN: 82.1 CM/SEC
BH CV ECHO MEAS - LV V1 VTI: 17.8 CM
BH CV ECHO MEAS - LV V1 VTI: 24.4 CM
BH CV ECHO MEAS - LVIDD: 4.5 CM
BH CV ECHO MEAS - LVIDD: 5.6 CM
BH CV ECHO MEAS - LVIDS: 2.2 CM
BH CV ECHO MEAS - LVIDS: 3 CM
BH CV ECHO MEAS - LVOT AREA: 3.3 CM^2
BH CV ECHO MEAS - LVOT AREA: 3.5 CM^2
BH CV ECHO MEAS - LVOT DIAM: 2 CM
BH CV ECHO MEAS - LVOT DIAM: 2.1 CM
BH CV ECHO MEAS - LVPWD: 0.8 CM
BH CV ECHO MEAS - LVPWD: 1 CM
BH CV ECHO MEAS - LVPWS: 1.4 CM
BH CV ECHO MEAS - MV A MAX VEL: 81.5 CM/SEC
BH CV ECHO MEAS - MV A MAX VEL: 92.3 CM/SEC
BH CV ECHO MEAS - MV DEC SLOPE: 127.3 CM/SEC^2
BH CV ECHO MEAS - MV DEC SLOPE: 265.3 CM/SEC^2
BH CV ECHO MEAS - MV DEC TIME: 0.25 SEC
BH CV ECHO MEAS - MV DEC TIME: 0.36 SEC
BH CV ECHO MEAS - MV E MAX VEL: 33.8 CM/SEC
BH CV ECHO MEAS - MV E MAX VEL: 45.9 CM/SEC
BH CV ECHO MEAS - MV E/A: 0.37
BH CV ECHO MEAS - MV E/A: 0.56
BH CV ECHO MEAS - MV MAX PG: 3.4 MMHG
BH CV ECHO MEAS - MV MAX PG: 3.4 MMHG
BH CV ECHO MEAS - MV MEAN PG: 1 MMHG
BH CV ECHO MEAS - MV MEAN PG: 1.4 MMHG
BH CV ECHO MEAS - MV V2 MAX: 91.6 CM/SEC
BH CV ECHO MEAS - MV V2 MAX: 92.5 CM/SEC
BH CV ECHO MEAS - MV V2 MEAN: 47.6 CM/SEC
BH CV ECHO MEAS - MV V2 MEAN: 56.4 CM/SEC
BH CV ECHO MEAS - MV V2 VTI: 20.4 CM
BH CV ECHO MEAS - MV V2 VTI: 26.2 CM
BH CV ECHO MEAS - MVA(VTI): 2.9 CM^2
BH CV ECHO MEAS - MVA(VTI): 3.3 CM^2
BH CV ECHO MEAS - PA ACC TIME: 0.08 SEC
BH CV ECHO MEAS - PA ACC TIME: 0.11 SEC
BH CV ECHO MEAS - PA MAX PG (FULL): 2.6 MMHG
BH CV ECHO MEAS - PA MAX PG: 5.3 MMHG
BH CV ECHO MEAS - PA MEAN PG (FULL): 1.3 MMHG
BH CV ECHO MEAS - PA MEAN PG: 2.8 MMHG
BH CV ECHO MEAS - PA PR(ACCEL): 30.7 MMHG
BH CV ECHO MEAS - PA PR(ACCEL): 43.2 MMHG
BH CV ECHO MEAS - PA V2 MAX: 115.4 CM/SEC
BH CV ECHO MEAS - PA V2 MEAN: 79.8 CM/SEC
BH CV ECHO MEAS - PA V2 VTI: 27.3 CM
BH CV ECHO MEAS - PULM A REVS DUR: 0.08 SEC
BH CV ECHO MEAS - PULM A REVS VEL: 36.3 CM/SEC
BH CV ECHO MEAS - PULM DIAS VEL: 50.6 CM/SEC
BH CV ECHO MEAS - PULM S/D: 1.3
BH CV ECHO MEAS - PULM SYS VEL: 67.4 CM/SEC
BH CV ECHO MEAS - RAP SYSTOLE: 3 MMHG
BH CV ECHO MEAS - RV MAX PG: 2.4 MMHG
BH CV ECHO MEAS - RV MAX PG: 2.7 MMHG
BH CV ECHO MEAS - RV MEAN PG: 1.2 MMHG
BH CV ECHO MEAS - RV MEAN PG: 1.5 MMHG
BH CV ECHO MEAS - RV V1 MAX: 77.2 CM/SEC
BH CV ECHO MEAS - RV V1 MAX: 82.4 CM/SEC
BH CV ECHO MEAS - RV V1 MEAN: 52 CM/SEC
BH CV ECHO MEAS - RV V1 MEAN: 58.7 CM/SEC
BH CV ECHO MEAS - RV V1 VTI: 14.8 CM
BH CV ECHO MEAS - RV V1 VTI: 19.5 CM
BH CV ECHO MEAS - RVDD: 2.4 CM
BH CV ECHO MEAS - RVDD: 2.6 CM
BH CV ECHO MEAS - RVSP: 14.5 MMHG
BH CV ECHO MEAS - SI(AO): 119.4 ML/M^2
BH CV ECHO MEAS - SI(AO): 134.4 ML/M^2
BH CV ECHO MEAS - SI(CUBED): 44 ML/M^2
BH CV ECHO MEAS - SI(CUBED): 79.2 ML/M^2
BH CV ECHO MEAS - SI(LVOT): 30.9 ML/M^2
BH CV ECHO MEAS - SI(LVOT): 45.7 ML/M^2
BH CV ECHO MEAS - SI(MOD-SP4): 17.6 ML/M^2
BH CV ECHO MEAS - SI(MOD-SP4): 21.7 ML/M^2
BH CV ECHO MEAS - SI(TEICH): 41.6 ML/M^2
BH CV ECHO MEAS - SI(TEICH): 63.1 ML/M^2
BH CV ECHO MEAS - SV(AO): 223.1 ML
BH CV ECHO MEAS - SV(AO): 253.7 ML
BH CV ECHO MEAS - SV(CUBED): 147.9 ML
BH CV ECHO MEAS - SV(CUBED): 83.1 ML
BH CV ECHO MEAS - SV(LVOT): 58.3 ML
BH CV ECHO MEAS - SV(LVOT): 85.4 ML
BH CV ECHO MEAS - SV(MOD-SP4): 33 ML
BH CV ECHO MEAS - SV(MOD-SP4): 41 ML
BH CV ECHO MEAS - SV(TEICH): 117.9 ML
BH CV ECHO MEAS - SV(TEICH): 78.5 ML
BH CV ECHO MEAS - TR MAX VEL: 169.8 CM/SEC
BILIRUB CONJ SERPL-MCNC: <0.2 MG/DL (ref 0–0.3)
BILIRUB SERPL-MCNC: 0.2 MG/DL (ref 0–1.2)
BILIRUB SERPL-MCNC: 0.3 MG/DL (ref 0–1.2)
BILIRUB SERPL-MCNC: 0.4 MG/DL (ref 0–1.2)
BILIRUB SERPL-MCNC: <0.2 MG/DL (ref 0–1.2)
BILIRUB UR QL STRIP: NEGATIVE
BOTTLE TYPE: ABNORMAL
BUN SERPL-MCNC: 11 MG/DL (ref 8–23)
BUN SERPL-MCNC: 12 MG/DL (ref 8–23)
BUN SERPL-MCNC: 12 MG/DL (ref 8–23)
BUN SERPL-MCNC: 13 MG/DL (ref 8–23)
BUN SERPL-MCNC: 14 MG/DL (ref 8–23)
BUN SERPL-MCNC: 15 MG/DL (ref 8–23)
BUN SERPL-MCNC: 16 MG/DL (ref 8–23)
BUN SERPL-MCNC: 17 MG/DL (ref 8–23)
BUN SERPL-MCNC: 18 MG/DL (ref 8–23)
BUN SERPL-MCNC: 18 MG/DL (ref 8–23)
BUN SERPL-MCNC: 19 MG/DL (ref 8–23)
BUN SERPL-MCNC: 19 MG/DL (ref 8–23)
BUN SERPL-MCNC: 20 MG/DL (ref 8–23)
BUN SERPL-MCNC: 20 MG/DL (ref 8–23)
BUN SERPL-MCNC: 21 MG/DL (ref 8–23)
BUN SERPL-MCNC: 21 MG/DL (ref 8–23)
BUN SERPL-MCNC: 22 MG/DL (ref 8–23)
BUN SERPL-MCNC: 24 MG/DL (ref 8–23)
BUN SERPL-MCNC: 29 MG/DL (ref 8–23)
BUN SERPL-MCNC: 30 MG/DL (ref 8–23)
BUN SERPL-MCNC: 31 MG/DL (ref 8–23)
BUN SERPL-MCNC: 34 MG/DL (ref 8–23)
BUN SERPL-MCNC: 41 MG/DL (ref 8–23)
BUN SERPL-MCNC: 46 MG/DL (ref 8–23)
BUN SERPL-MCNC: 50 MG/DL (ref 8–23)
BUN SERPL-MCNC: 52 MG/DL (ref 8–23)
BUN SERPL-MCNC: 57 MG/DL (ref 8–23)
BUN SERPL-MCNC: 61 MG/DL (ref 8–23)
BUN/CREAT SERPL: 11.7 (ref 7–25)
BUN/CREAT SERPL: 12 (ref 7–25)
BUN/CREAT SERPL: 12.4 (ref 7–25)
BUN/CREAT SERPL: 13.1 (ref 7–25)
BUN/CREAT SERPL: 14.6 (ref 7–25)
BUN/CREAT SERPL: 15.3 (ref 7–25)
BUN/CREAT SERPL: 15.4 (ref 7–25)
BUN/CREAT SERPL: 16.7 (ref 7–25)
BUN/CREAT SERPL: 17 (ref 7–25)
BUN/CREAT SERPL: 17 (ref 7–25)
BUN/CREAT SERPL: 18 (ref 7–25)
BUN/CREAT SERPL: 18.1 (ref 7–25)
BUN/CREAT SERPL: 18.9 (ref 7–25)
BUN/CREAT SERPL: 19 (ref 7–25)
BUN/CREAT SERPL: 20.2 (ref 7–25)
BUN/CREAT SERPL: 20.7 (ref 7–25)
BUN/CREAT SERPL: 20.8 (ref 7–25)
BUN/CREAT SERPL: 21.5 (ref 7–25)
BUN/CREAT SERPL: 22.9 (ref 7–25)
BUN/CREAT SERPL: 23 (ref 7–25)
BUN/CREAT SERPL: 23.3 (ref 7–25)
BUN/CREAT SERPL: 25.5 (ref 7–25)
BUN/CREAT SERPL: 27.5 (ref 7–25)
BUN/CREAT SERPL: 28.3 (ref 7–25)
BUN/CREAT SERPL: 28.8 (ref 7–25)
BUN/CREAT SERPL: 29.6 (ref 7–25)
BUN/CREAT SERPL: 29.6 (ref 7–25)
BUN/CREAT SERPL: 29.7 (ref 7–25)
BUN/CREAT SERPL: 30.5 (ref 7–25)
BUN/CREAT SERPL: 30.7 (ref 7–25)
BUN/CREAT SERPL: 32.9 (ref 7–25)
BUN/CREAT SERPL: 36.1 (ref 7–25)
BUN/CREAT SERPL: 37 (ref 7–25)
BUN/CREAT SERPL: 47.9 (ref 7–25)
BUN/CREAT SERPL: 52.6 (ref 7–25)
BUN/CREAT SERPL: 52.6 (ref 7–25)
BUN/CREAT SERPL: 55.5 (ref 7–25)
C PNEUM DNA NPH QL NAA+NON-PROBE: NOT DETECTED
CA-I SERPL ISE-MCNC: 0.74 MMOL/L (ref 1.2–1.3)
CA-I SERPL ISE-MCNC: 0.93 MMOL/L (ref 1.2–1.3)
CA-I SERPL ISE-MCNC: 0.99 MMOL/L (ref 1.2–1.3)
CA-I SERPL ISE-MCNC: 1 MMOL/L (ref 1.2–1.3)
CALCIUM SPEC-SCNC: 5.2 MG/DL (ref 8.6–10.5)
CALCIUM SPEC-SCNC: 5.7 MG/DL (ref 8.6–10.5)
CALCIUM SPEC-SCNC: 5.7 MG/DL (ref 8.6–10.5)
CALCIUM SPEC-SCNC: 6 MG/DL (ref 8.6–10.5)
CALCIUM SPEC-SCNC: 6 MG/DL (ref 8.6–10.5)
CALCIUM SPEC-SCNC: 6.5 MG/DL (ref 8.6–10.5)
CALCIUM SPEC-SCNC: 6.6 MG/DL (ref 8.6–10.5)
CALCIUM SPEC-SCNC: 6.7 MG/DL (ref 8.6–10.5)
CALCIUM SPEC-SCNC: 6.8 MG/DL (ref 8.6–10.5)
CALCIUM SPEC-SCNC: 7 MG/DL (ref 8.6–10.5)
CALCIUM SPEC-SCNC: 7.2 MG/DL (ref 8.6–10.5)
CALCIUM SPEC-SCNC: 7.3 MG/DL (ref 8.6–10.5)
CALCIUM SPEC-SCNC: 7.8 MG/DL (ref 8.6–10.5)
CALCIUM SPEC-SCNC: 7.9 MG/DL (ref 8.6–10.5)
CALCIUM SPEC-SCNC: 8 MG/DL (ref 8.6–10.5)
CALCIUM SPEC-SCNC: 8.1 MG/DL (ref 8.6–10.5)
CALCIUM SPEC-SCNC: 8.4 MG/DL (ref 8.6–10.5)
CALCIUM SPEC-SCNC: 8.5 MG/DL (ref 8.6–10.5)
CALCIUM SPEC-SCNC: 8.6 MG/DL (ref 8.6–10.5)
CALCIUM SPEC-SCNC: 8.6 MG/DL (ref 8.6–10.5)
CALCIUM SPEC-SCNC: 8.8 MG/DL (ref 8.6–10.5)
CALCIUM SPEC-SCNC: 8.8 MG/DL (ref 8.6–10.5)
CALCIUM SPEC-SCNC: 8.9 MG/DL (ref 8.6–10.5)
CALCIUM SPEC-SCNC: 8.9 MG/DL (ref 8.6–10.5)
CALCIUM SPEC-SCNC: 9 MG/DL (ref 8.6–10.5)
CALCIUM SPEC-SCNC: 9.1 MG/DL (ref 8.6–10.5)
CALCIUM SPEC-SCNC: 9.5 MG/DL (ref 8.6–10.5)
CALCIUM SPEC-SCNC: 9.7 MG/DL (ref 8.6–10.5)
CANCER AG125 SERPL QL: 109.1 U/ML (ref 0–38.1)
CANCER AG125 SERPL QL: 109.9 U/ML (ref 0–38.1)
CANCER AG125 SERPL QL: 120.8 U/ML (ref 0–38.1)
CANCER AG125 SERPL QL: 121.9 U/ML (ref 0–38.1)
CANCER AG125 SERPL QL: 94.8 U/ML (ref 0–38.1)
CHLORIDE SERPL-SCNC: 100 MMOL/L (ref 98–107)
CHLORIDE SERPL-SCNC: 101 MMOL/L (ref 98–107)
CHLORIDE SERPL-SCNC: 102 MMOL/L (ref 98–107)
CHLORIDE SERPL-SCNC: 103 MMOL/L (ref 98–107)
CHLORIDE SERPL-SCNC: 104 MMOL/L (ref 98–107)
CHLORIDE SERPL-SCNC: 105 MMOL/L (ref 98–107)
CHLORIDE SERPL-SCNC: 105 MMOL/L (ref 98–107)
CHLORIDE SERPL-SCNC: 106 MMOL/L (ref 98–107)
CHLORIDE SERPL-SCNC: 109 MMOL/L (ref 98–107)
CHLORIDE SERPL-SCNC: 109 MMOL/L (ref 98–107)
CHLORIDE SERPL-SCNC: 112 MMOL/L (ref 98–107)
CHLORIDE SERPL-SCNC: 97 MMOL/L (ref 98–107)
CHLORIDE SERPL-SCNC: 98 MMOL/L (ref 98–107)
CHLORIDE SERPL-SCNC: 99 MMOL/L (ref 98–107)
CK SERPL-CCNC: 100 U/L (ref 20–180)
CK SERPL-CCNC: 149 U/L (ref 20–180)
CK SERPL-CCNC: 173 U/L (ref 20–180)
CLARITY UR: ABNORMAL
CLARITY UR: ABNORMAL
CLARITY UR: CLEAR
CMV DNA SPEC QL NAA+PROBE: NEGATIVE
CMV DNA SPEC QL NAA+PROBE: NEGATIVE
CO2 BLDA-SCNC: 20.5 MMOL/L (ref 22–29)
CO2 SERPL-SCNC: 21 MMOL/L (ref 22–29)
CO2 SERPL-SCNC: 22 MMOL/L (ref 22–29)
CO2 SERPL-SCNC: 22 MMOL/L (ref 22–29)
CO2 SERPL-SCNC: 23 MMOL/L (ref 22–29)
CO2 SERPL-SCNC: 24 MMOL/L (ref 22–29)
CO2 SERPL-SCNC: 25 MMOL/L (ref 22–29)
CO2 SERPL-SCNC: 26 MMOL/L (ref 22–29)
CO2 SERPL-SCNC: 27 MMOL/L (ref 22–29)
CO2 SERPL-SCNC: 28 MMOL/L (ref 22–29)
CO2 SERPL-SCNC: 28 MMOL/L (ref 22–29)
COLOR UR: YELLOW
CREAT BLDA-MCNC: 0.8 MG/DL (ref 0.6–1.3)
CREAT BLDA-MCNC: 1 MG/DL (ref 0.6–1.3)
CREAT BLDA-MCNC: 1.2 MG/DL (ref 0.6–1.3)
CREAT SERPL-MCNC: 0.54 MG/DL (ref 0.57–1)
CREAT SERPL-MCNC: 0.6 MG/DL (ref 0.57–1)
CREAT SERPL-MCNC: 0.71 MG/DL (ref 0.57–1)
CREAT SERPL-MCNC: 0.71 MG/DL (ref 0.57–1)
CREAT SERPL-MCNC: 0.73 MG/DL (ref 0.57–1)
CREAT SERPL-MCNC: 0.74 MG/DL (ref 0.57–1)
CREAT SERPL-MCNC: 0.77 MG/DL (ref 0.57–1)
CREAT SERPL-MCNC: 0.78 MG/DL (ref 0.57–1)
CREAT SERPL-MCNC: 0.79 MG/DL (ref 0.57–1)
CREAT SERPL-MCNC: 0.8 MG/DL (ref 0.57–1)
CREAT SERPL-MCNC: 0.82 MG/DL (ref 0.57–1)
CREAT SERPL-MCNC: 0.83 MG/DL (ref 0.57–1)
CREAT SERPL-MCNC: 0.83 MG/DL (ref 0.57–1)
CREAT SERPL-MCNC: 0.84 MG/DL (ref 0.57–1)
CREAT SERPL-MCNC: 0.85 MG/DL (ref 0.57–1)
CREAT SERPL-MCNC: 0.87 MG/DL (ref 0.57–1)
CREAT SERPL-MCNC: 0.89 MG/DL (ref 0.57–1)
CREAT SERPL-MCNC: 0.9 MG/DL (ref 0.57–1)
CREAT SERPL-MCNC: 0.91 MG/DL (ref 0.57–1)
CREAT SERPL-MCNC: 0.92 MG/DL (ref 0.57–1)
CREAT SERPL-MCNC: 0.94 MG/DL (ref 0.57–1)
CREAT SERPL-MCNC: 0.95 MG/DL (ref 0.57–1)
CREAT SERPL-MCNC: 0.95 MG/DL (ref 0.57–1)
CREAT SERPL-MCNC: 0.97 MG/DL (ref 0.57–1)
CREAT SERPL-MCNC: 1 MG/DL (ref 0.57–1)
CREAT SERPL-MCNC: 1 MG/DL (ref 0.57–1)
CREAT SERPL-MCNC: 1.01 MG/DL (ref 0.57–1)
CREAT SERPL-MCNC: 1.05 MG/DL (ref 0.57–1)
CREAT SERPL-MCNC: 1.06 MG/DL (ref 0.57–1)
CREAT SERPL-MCNC: 1.1 MG/DL (ref 0.57–1)
CREAT SERPL-MCNC: 1.11 MG/DL (ref 0.57–1)
CREAT SERPL-MCNC: 1.14 MG/DL (ref 0.57–1)
CREAT SERPL-MCNC: 1.54 MG/DL (ref 0.57–1)
CREAT SERPL-MCNC: 1.6 MG/DL (ref 0.57–1)
CREAT SERPL-MCNC: 1.75 MG/DL (ref 0.57–1)
CRP SERPL-MCNC: 0.71 MG/DL (ref 0–0.5)
CRP SERPL-MCNC: 0.81 MG/DL (ref 0–0.5)
CRP SERPL-MCNC: 1.21 MG/DL (ref 0–0.5)
CRP SERPL-MCNC: 2.45 MG/DL (ref 0–0.5)
CRP SERPL-MCNC: 3.39 MG/DL (ref 0–0.5)
CRP SERPL-MCNC: 3.52 MG/DL (ref 0–0.5)
CRP SERPL-MCNC: 3.76 MG/DL (ref 0–0.5)
D DIMER PPP FEU-MCNC: <0.19 MG/L (FEU) (ref 0–0.59)
D-LACTATE SERPL-SCNC: 1.7 MMOL/L (ref 0.5–2)
D-LACTATE SERPL-SCNC: 1.8 MMOL/L (ref 0.5–2)
DEPRECATED RDW RBC AUTO: 51.6 FL (ref 37–54)
DEPRECATED RDW RBC AUTO: 52.1 FL (ref 37–54)
DEPRECATED RDW RBC AUTO: 52.3 FL (ref 37–54)
DEPRECATED RDW RBC AUTO: 52.5 FL (ref 37–54)
DEPRECATED RDW RBC AUTO: 53.4 FL (ref 37–54)
DEPRECATED RDW RBC AUTO: 54.3 FL (ref 37–54)
DEPRECATED RDW RBC AUTO: 54.7 FL (ref 37–54)
DEPRECATED RDW RBC AUTO: 56 FL (ref 37–54)
DEPRECATED RDW RBC AUTO: 56.4 FL (ref 37–54)
DEPRECATED RDW RBC AUTO: 57.3 FL (ref 37–54)
DEPRECATED RDW RBC AUTO: 57.3 FL (ref 37–54)
DEPRECATED RDW RBC AUTO: 57.8 FL (ref 37–54)
DEPRECATED RDW RBC AUTO: 58.1 FL (ref 37–54)
DEPRECATED RDW RBC AUTO: 58.1 FL (ref 37–54)
DEPRECATED RDW RBC AUTO: 58.2 FL (ref 37–54)
DEPRECATED RDW RBC AUTO: 58.2 FL (ref 37–54)
DEPRECATED RDW RBC AUTO: 58.8 FL (ref 37–54)
DEPRECATED RDW RBC AUTO: 59.4 FL (ref 37–54)
DEPRECATED RDW RBC AUTO: 59.9 FL (ref 37–54)
DEPRECATED RDW RBC AUTO: 60.4 FL (ref 37–54)
DEPRECATED RDW RBC AUTO: 60.8 FL (ref 37–54)
DEPRECATED RDW RBC AUTO: 61 FL (ref 37–54)
DEPRECATED RDW RBC AUTO: 61.3 FL (ref 37–54)
DEPRECATED RDW RBC AUTO: 61.4 FL (ref 37–54)
DEPRECATED RDW RBC AUTO: 61.7 FL (ref 37–54)
DEPRECATED RDW RBC AUTO: 61.9 FL (ref 37–54)
DEPRECATED RDW RBC AUTO: 62.1 FL (ref 37–54)
DEPRECATED RDW RBC AUTO: 62.3 FL (ref 37–54)
DEPRECATED RDW RBC AUTO: 62.7 FL (ref 37–54)
DEPRECATED RDW RBC AUTO: 63 FL (ref 37–54)
DEPRECATED RDW RBC AUTO: 63 FL (ref 37–54)
DEPRECATED RDW RBC AUTO: 63.3 FL (ref 37–54)
DEPRECATED RDW RBC AUTO: 63.9 FL (ref 37–54)
DEPRECATED RDW RBC AUTO: 64 FL (ref 37–54)
DEPRECATED RDW RBC AUTO: 64.1 FL (ref 37–54)
DEPRECATED RDW RBC AUTO: 64.2 FL (ref 37–54)
DEPRECATED RDW RBC AUTO: 64.4 FL (ref 37–54)
DEPRECATED RDW RBC AUTO: 64.5 FL (ref 37–54)
DEPRECATED RDW RBC AUTO: 64.6 FL (ref 37–54)
DEPRECATED RDW RBC AUTO: 65 FL (ref 37–54)
DEPRECATED RDW RBC AUTO: 66.1 FL (ref 37–54)
DEPRECATED RDW RBC AUTO: 66.6 FL (ref 37–54)
DEPRECATED RDW RBC AUTO: 67.4 FL (ref 37–54)
DEPRECATED RDW RBC AUTO: 67.7 FL (ref 37–54)
DEPRECATED RDW RBC AUTO: 72.3 FL (ref 37–54)
EOSINOPHIL # BLD AUTO: 0 10*3/MM3 (ref 0–0.4)
EOSINOPHIL # BLD AUTO: 0.01 10*3/MM3 (ref 0–0.4)
EOSINOPHIL # BLD AUTO: 0.01 10*3/MM3 (ref 0–0.4)
EOSINOPHIL # BLD AUTO: 0.03 10*3/MM3 (ref 0–0.4)
EOSINOPHIL # BLD AUTO: 0.04 10*3/MM3 (ref 0–0.4)
EOSINOPHIL # BLD AUTO: 0.05 10*3/MM3 (ref 0–0.4)
EOSINOPHIL # BLD AUTO: 0.06 10*3/MM3 (ref 0–0.4)
EOSINOPHIL # BLD AUTO: 0.07 10*3/MM3 (ref 0–0.4)
EOSINOPHIL # BLD AUTO: 0.07 10*3/MM3 (ref 0–0.4)
EOSINOPHIL # BLD AUTO: 0.08 10*3/MM3 (ref 0–0.4)
EOSINOPHIL # BLD AUTO: 0.09 10*3/MM3 (ref 0–0.4)
EOSINOPHIL # BLD AUTO: 0.1 10*3/MM3 (ref 0–0.4)
EOSINOPHIL # BLD AUTO: 0.11 10*3/MM3 (ref 0–0.4)
EOSINOPHIL # BLD AUTO: 0.11 10*3/MM3 (ref 0–0.4)
EOSINOPHIL # BLD AUTO: 0.12 10*3/MM3 (ref 0–0.4)
EOSINOPHIL # BLD AUTO: 0.12 10*3/MM3 (ref 0–0.4)
EOSINOPHIL # BLD AUTO: 0.13 10*3/MM3 (ref 0–0.4)
EOSINOPHIL # BLD AUTO: 0.14 10*3/MM3 (ref 0–0.4)
EOSINOPHIL # BLD AUTO: 0.16 10*3/MM3 (ref 0–0.4)
EOSINOPHIL NFR BLD AUTO: 0 % (ref 0.3–6.2)
EOSINOPHIL NFR BLD AUTO: 0.1 % (ref 0.3–6.2)
EOSINOPHIL NFR BLD AUTO: 0.2 % (ref 0.3–6.2)
EOSINOPHIL NFR BLD AUTO: 0.2 % (ref 0.3–6.2)
EOSINOPHIL NFR BLD AUTO: 0.4 % (ref 0.3–6.2)
EOSINOPHIL NFR BLD AUTO: 0.5 % (ref 0.3–6.2)
EOSINOPHIL NFR BLD AUTO: 0.6 % (ref 0.3–6.2)
EOSINOPHIL NFR BLD AUTO: 0.6 % (ref 0.3–6.2)
EOSINOPHIL NFR BLD AUTO: 0.8 % (ref 0.3–6.2)
EOSINOPHIL NFR BLD AUTO: 0.9 % (ref 0.3–6.2)
EOSINOPHIL NFR BLD AUTO: 0.9 % (ref 0.3–6.2)
EOSINOPHIL NFR BLD AUTO: 1 % (ref 0.3–6.2)
EOSINOPHIL NFR BLD AUTO: 1 % (ref 0.3–6.2)
EOSINOPHIL NFR BLD AUTO: 1.1 % (ref 0.3–6.2)
EOSINOPHIL NFR BLD AUTO: 1.1 % (ref 0.3–6.2)
EOSINOPHIL NFR BLD AUTO: 1.2 % (ref 0.3–6.2)
EOSINOPHIL NFR BLD AUTO: 1.3 % (ref 0.3–6.2)
EOSINOPHIL NFR BLD AUTO: 1.4 % (ref 0.3–6.2)
EOSINOPHIL NFR BLD AUTO: 1.6 % (ref 0.3–6.2)
EOSINOPHIL NFR BLD AUTO: 1.6 % (ref 0.3–6.2)
EOSINOPHIL NFR BLD AUTO: 1.7 % (ref 0.3–6.2)
EOSINOPHIL NFR BLD AUTO: 1.9 % (ref 0.3–6.2)
EOSINOPHIL NFR BLD AUTO: 2 % (ref 0.3–6.2)
EOSINOPHIL NFR BLD AUTO: 2 % (ref 0.3–6.2)
EOSINOPHIL NFR BLD AUTO: 2.2 % (ref 0.3–6.2)
EOSINOPHIL NFR BLD AUTO: 2.3 % (ref 0.3–6.2)
EOSINOPHIL NFR BLD AUTO: 2.3 % (ref 0.3–6.2)
EOSINOPHIL NFR BLD AUTO: 2.5 % (ref 0.3–6.2)
EOSINOPHIL NFR BLD AUTO: 3.1 % (ref 0.3–6.2)
EOSINOPHIL NFR BLD AUTO: 3.4 % (ref 0.3–6.2)
EOSINOPHIL NFR BLD AUTO: 3.6 % (ref 0.3–6.2)
EOSINOPHIL NFR BLD AUTO: 3.6 % (ref 0.3–6.2)
ERYTHROCYTE [DISTWIDTH] IN BLOOD BY AUTOMATED COUNT: 17.1 % (ref 12.3–15.4)
ERYTHROCYTE [DISTWIDTH] IN BLOOD BY AUTOMATED COUNT: 17.3 % (ref 12.3–15.4)
ERYTHROCYTE [DISTWIDTH] IN BLOOD BY AUTOMATED COUNT: 17.4 % (ref 12.3–15.4)
ERYTHROCYTE [DISTWIDTH] IN BLOOD BY AUTOMATED COUNT: 17.5 % (ref 12.3–15.4)
ERYTHROCYTE [DISTWIDTH] IN BLOOD BY AUTOMATED COUNT: 18.2 % (ref 12.3–15.4)
ERYTHROCYTE [DISTWIDTH] IN BLOOD BY AUTOMATED COUNT: 18.3 % (ref 12.3–15.4)
ERYTHROCYTE [DISTWIDTH] IN BLOOD BY AUTOMATED COUNT: 18.3 % (ref 12.3–15.4)
ERYTHROCYTE [DISTWIDTH] IN BLOOD BY AUTOMATED COUNT: 18.4 % (ref 12.3–15.4)
ERYTHROCYTE [DISTWIDTH] IN BLOOD BY AUTOMATED COUNT: 18.6 % (ref 12.3–15.4)
ERYTHROCYTE [DISTWIDTH] IN BLOOD BY AUTOMATED COUNT: 18.7 % (ref 12.3–15.4)
ERYTHROCYTE [DISTWIDTH] IN BLOOD BY AUTOMATED COUNT: 18.9 % (ref 12.3–15.4)
ERYTHROCYTE [DISTWIDTH] IN BLOOD BY AUTOMATED COUNT: 18.9 % (ref 12.3–15.4)
ERYTHROCYTE [DISTWIDTH] IN BLOOD BY AUTOMATED COUNT: 19 % (ref 12.3–15.4)
ERYTHROCYTE [DISTWIDTH] IN BLOOD BY AUTOMATED COUNT: 19.2 % (ref 12.3–15.4)
ERYTHROCYTE [DISTWIDTH] IN BLOOD BY AUTOMATED COUNT: 19.3 % (ref 12.3–15.4)
ERYTHROCYTE [DISTWIDTH] IN BLOOD BY AUTOMATED COUNT: 19.4 % (ref 12.3–15.4)
ERYTHROCYTE [DISTWIDTH] IN BLOOD BY AUTOMATED COUNT: 19.5 % (ref 12.3–15.4)
ERYTHROCYTE [DISTWIDTH] IN BLOOD BY AUTOMATED COUNT: 19.5 % (ref 12.3–15.4)
ERYTHROCYTE [DISTWIDTH] IN BLOOD BY AUTOMATED COUNT: 19.8 % (ref 12.3–15.4)
ERYTHROCYTE [DISTWIDTH] IN BLOOD BY AUTOMATED COUNT: 20 % (ref 12.3–15.4)
ERYTHROCYTE [DISTWIDTH] IN BLOOD BY AUTOMATED COUNT: 20.3 % (ref 12.3–15.4)
ERYTHROCYTE [DISTWIDTH] IN BLOOD BY AUTOMATED COUNT: 20.5 % (ref 12.3–15.4)
ERYTHROCYTE [DISTWIDTH] IN BLOOD BY AUTOMATED COUNT: 20.6 % (ref 12.3–15.4)
ERYTHROCYTE [DISTWIDTH] IN BLOOD BY AUTOMATED COUNT: 20.8 % (ref 12.3–15.4)
ERYTHROCYTE [DISTWIDTH] IN BLOOD BY AUTOMATED COUNT: 20.9 % (ref 12.3–15.4)
ERYTHROCYTE [DISTWIDTH] IN BLOOD BY AUTOMATED COUNT: 20.9 % (ref 12.3–15.4)
ERYTHROCYTE [DISTWIDTH] IN BLOOD BY AUTOMATED COUNT: 21 % (ref 12.3–15.4)
ERYTHROCYTE [DISTWIDTH] IN BLOOD BY AUTOMATED COUNT: 21.1 % (ref 12.3–15.4)
ERYTHROCYTE [DISTWIDTH] IN BLOOD BY AUTOMATED COUNT: 21.7 % (ref 12.3–15.4)
ERYTHROCYTE [DISTWIDTH] IN BLOOD BY AUTOMATED COUNT: 21.9 % (ref 12.3–15.4)
ERYTHROCYTE [SEDIMENTATION RATE] IN BLOOD: 57 MM/HR (ref 0–30)
FERRITIN SERPL-MCNC: 1815 NG/ML (ref 13–150)
FERRITIN SERPL-MCNC: 2000 NG/ML (ref 13–150)
FERRITIN SERPL-MCNC: 2606 NG/ML (ref 13–150)
FERRITIN SERPL-MCNC: 3036 NG/ML (ref 13–150)
FERRITIN SERPL-MCNC: 3132 NG/ML (ref 13–150)
FERRITIN SERPL-MCNC: 3251 NG/ML (ref 13–150)
FERRITIN SERPL-MCNC: 329.5 NG/ML (ref 13–150)
FERRITIN SERPL-MCNC: 3333 NG/ML (ref 13–150)
FERRITIN SERPL-MCNC: 884.5 NG/ML (ref 13–150)
FLUAV SUBTYP SPEC NAA+PROBE: NOT DETECTED
FLUBV RNA ISLT QL NAA+PROBE: NOT DETECTED
FUNGUS WND CULT: ABNORMAL
GFR SERPL CREATININE-BSD FRML MDRD: 100 ML/MIN/1.73
GFR SERPL CREATININE-BSD FRML MDRD: 113 ML/MIN/1.73
GFR SERPL CREATININE-BSD FRML MDRD: 29 ML/MIN/1.73
GFR SERPL CREATININE-BSD FRML MDRD: 32 ML/MIN/1.73
GFR SERPL CREATININE-BSD FRML MDRD: 34 ML/MIN/1.73
GFR SERPL CREATININE-BSD FRML MDRD: 48 ML/MIN/1.73
GFR SERPL CREATININE-BSD FRML MDRD: 49 ML/MIN/1.73
GFR SERPL CREATININE-BSD FRML MDRD: 50 ML/MIN/1.73
GFR SERPL CREATININE-BSD FRML MDRD: 52 ML/MIN/1.73
GFR SERPL CREATININE-BSD FRML MDRD: 53 ML/MIN/1.73
GFR SERPL CREATININE-BSD FRML MDRD: 55 ML/MIN/1.73
GFR SERPL CREATININE-BSD FRML MDRD: 55 ML/MIN/1.73
GFR SERPL CREATININE-BSD FRML MDRD: 56 ML/MIN/1.73
GFR SERPL CREATININE-BSD FRML MDRD: 57 ML/MIN/1.73
GFR SERPL CREATININE-BSD FRML MDRD: 59 ML/MIN/1.73
GFR SERPL CREATININE-BSD FRML MDRD: 59 ML/MIN/1.73
GFR SERPL CREATININE-BSD FRML MDRD: 60 ML/MIN/1.73
GFR SERPL CREATININE-BSD FRML MDRD: 61 ML/MIN/1.73
GFR SERPL CREATININE-BSD FRML MDRD: 62 ML/MIN/1.73
GFR SERPL CREATININE-BSD FRML MDRD: 63 ML/MIN/1.73
GFR SERPL CREATININE-BSD FRML MDRD: 64 ML/MIN/1.73
GFR SERPL CREATININE-BSD FRML MDRD: 65 ML/MIN/1.73
GFR SERPL CREATININE-BSD FRML MDRD: 67 ML/MIN/1.73
GFR SERPL CREATININE-BSD FRML MDRD: 68 ML/MIN/1.73
GFR SERPL CREATININE-BSD FRML MDRD: 69 ML/MIN/1.73
GFR SERPL CREATININE-BSD FRML MDRD: 69 ML/MIN/1.73
GFR SERPL CREATININE-BSD FRML MDRD: 70 ML/MIN/1.73
GFR SERPL CREATININE-BSD FRML MDRD: 72 ML/MIN/1.73
GFR SERPL CREATININE-BSD FRML MDRD: 73 ML/MIN/1.73
GFR SERPL CREATININE-BSD FRML MDRD: 74 ML/MIN/1.73
GFR SERPL CREATININE-BSD FRML MDRD: 75 ML/MIN/1.73
GFR SERPL CREATININE-BSD FRML MDRD: 79 ML/MIN/1.73
GFR SERPL CREATININE-BSD FRML MDRD: 80 ML/MIN/1.73
GFR SERPL CREATININE-BSD FRML MDRD: 82 ML/MIN/1.73
GFR SERPL CREATININE-BSD FRML MDRD: 82 ML/MIN/1.73
GLOBULIN UR ELPH-MCNC: 2.2 GM/DL
GLOBULIN UR ELPH-MCNC: 2.3 GM/DL
GLOBULIN UR ELPH-MCNC: 2.3 GM/DL
GLOBULIN UR ELPH-MCNC: 2.9 GM/DL
GLOBULIN UR ELPH-MCNC: 3 GM/DL
GLOBULIN UR ELPH-MCNC: 3.1 GM/DL
GLOBULIN UR ELPH-MCNC: 3.2 GM/DL
GLOBULIN UR ELPH-MCNC: 3.2 GM/DL
GLOBULIN UR ELPH-MCNC: 3.3 GM/DL
GLOBULIN UR ELPH-MCNC: 3.4 GM/DL
GLOBULIN UR ELPH-MCNC: 3.5 GM/DL
GLOBULIN UR ELPH-MCNC: 3.6 GM/DL
GLOBULIN UR ELPH-MCNC: 3.6 GM/DL
GLOBULIN UR ELPH-MCNC: 3.8 GM/DL
GLOBULIN UR ELPH-MCNC: 4.1 GM/DL
GLUCOSE BLDC GLUCOMTR-MCNC: 230 MG/DL (ref 74–100)
GLUCOSE SERPL-MCNC: 101 MG/DL (ref 65–99)
GLUCOSE SERPL-MCNC: 103 MG/DL (ref 65–99)
GLUCOSE SERPL-MCNC: 103 MG/DL (ref 65–99)
GLUCOSE SERPL-MCNC: 104 MG/DL (ref 65–99)
GLUCOSE SERPL-MCNC: 107 MG/DL (ref 65–99)
GLUCOSE SERPL-MCNC: 107 MG/DL (ref 65–99)
GLUCOSE SERPL-MCNC: 112 MG/DL (ref 65–99)
GLUCOSE SERPL-MCNC: 113 MG/DL (ref 65–99)
GLUCOSE SERPL-MCNC: 114 MG/DL (ref 65–99)
GLUCOSE SERPL-MCNC: 120 MG/DL (ref 65–99)
GLUCOSE SERPL-MCNC: 123 MG/DL (ref 65–99)
GLUCOSE SERPL-MCNC: 126 MG/DL (ref 65–99)
GLUCOSE SERPL-MCNC: 128 MG/DL (ref 65–99)
GLUCOSE SERPL-MCNC: 132 MG/DL (ref 65–99)
GLUCOSE SERPL-MCNC: 141 MG/DL (ref 65–99)
GLUCOSE SERPL-MCNC: 146 MG/DL (ref 65–99)
GLUCOSE SERPL-MCNC: 146 MG/DL (ref 65–99)
GLUCOSE SERPL-MCNC: 148 MG/DL (ref 65–99)
GLUCOSE SERPL-MCNC: 149 MG/DL (ref 65–99)
GLUCOSE SERPL-MCNC: 155 MG/DL (ref 65–99)
GLUCOSE SERPL-MCNC: 155 MG/DL (ref 65–99)
GLUCOSE SERPL-MCNC: 206 MG/DL (ref 65–99)
GLUCOSE SERPL-MCNC: 67 MG/DL (ref 65–99)
GLUCOSE SERPL-MCNC: 74 MG/DL (ref 65–99)
GLUCOSE SERPL-MCNC: 76 MG/DL (ref 65–99)
GLUCOSE SERPL-MCNC: 78 MG/DL (ref 65–99)
GLUCOSE SERPL-MCNC: 79 MG/DL (ref 65–99)
GLUCOSE SERPL-MCNC: 82 MG/DL (ref 65–99)
GLUCOSE SERPL-MCNC: 85 MG/DL (ref 65–99)
GLUCOSE SERPL-MCNC: 86 MG/DL (ref 65–99)
GLUCOSE SERPL-MCNC: 86 MG/DL (ref 65–99)
GLUCOSE SERPL-MCNC: 89 MG/DL (ref 65–99)
GLUCOSE SERPL-MCNC: 92 MG/DL (ref 65–99)
GLUCOSE SERPL-MCNC: 96 MG/DL (ref 65–99)
GLUCOSE SERPL-MCNC: 97 MG/DL (ref 65–99)
GLUCOSE UR STRIP-MCNC: NEGATIVE MG/DL
GRAM STN SPEC: ABNORMAL
GRAM STN SPEC: NORMAL
GRAN CASTS URNS QL MICRO: ABNORMAL /LPF
HADV DNA SPEC NAA+PROBE: NOT DETECTED
HCO3 BLDA-SCNC: 19.6 MMOL/L (ref 21–28)
HCOV 229E RNA SPEC QL NAA+PROBE: NOT DETECTED
HCOV HKU1 RNA SPEC QL NAA+PROBE: NOT DETECTED
HCOV NL63 RNA SPEC QL NAA+PROBE: NOT DETECTED
HCOV OC43 RNA SPEC QL NAA+PROBE: NOT DETECTED
HCT VFR BLD AUTO: 25.6 % (ref 34–46.6)
HCT VFR BLD AUTO: 26 % (ref 34–46.6)
HCT VFR BLD AUTO: 26.6 % (ref 34–46.6)
HCT VFR BLD AUTO: 26.6 % (ref 34–46.6)
HCT VFR BLD AUTO: 27.4 % (ref 34–46.6)
HCT VFR BLD AUTO: 27.4 % (ref 34–46.6)
HCT VFR BLD AUTO: 27.7 % (ref 34–46.6)
HCT VFR BLD AUTO: 28.9 % (ref 34–46.6)
HCT VFR BLD AUTO: 29.1 % (ref 34–46.6)
HCT VFR BLD AUTO: 29.1 % (ref 34–46.6)
HCT VFR BLD AUTO: 29.2 % (ref 34–46.6)
HCT VFR BLD AUTO: 29.3 % (ref 34–46.6)
HCT VFR BLD AUTO: 29.4 % (ref 34–46.6)
HCT VFR BLD AUTO: 29.6 % (ref 34–46.6)
HCT VFR BLD AUTO: 29.6 % (ref 34–46.6)
HCT VFR BLD AUTO: 29.7 % (ref 34–46.6)
HCT VFR BLD AUTO: 29.8 % (ref 34–46.6)
HCT VFR BLD AUTO: 30.2 % (ref 34–46.6)
HCT VFR BLD AUTO: 30.7 % (ref 34–46.6)
HCT VFR BLD AUTO: 31.2 % (ref 34–46.6)
HCT VFR BLD AUTO: 31.4 % (ref 34–46.6)
HCT VFR BLD AUTO: 31.8 % (ref 34–46.6)
HCT VFR BLD AUTO: 32 % (ref 34–46.6)
HCT VFR BLD AUTO: 32.6 % (ref 34–46.6)
HCT VFR BLD AUTO: 32.6 % (ref 34–46.6)
HCT VFR BLD AUTO: 32.7 % (ref 34–46.6)
HCT VFR BLD AUTO: 32.8 % (ref 34–46.6)
HCT VFR BLD AUTO: 33 % (ref 34–46.6)
HCT VFR BLD AUTO: 33.3 % (ref 34–46.6)
HCT VFR BLD AUTO: 33.4 % (ref 34–46.6)
HCT VFR BLD AUTO: 33.5 % (ref 34–46.6)
HCT VFR BLD AUTO: 33.6 % (ref 34–46.6)
HCT VFR BLD AUTO: 33.6 % (ref 34–46.6)
HCT VFR BLD AUTO: 33.8 % (ref 34–46.6)
HCT VFR BLD AUTO: 34 % (ref 34–46.6)
HCT VFR BLD AUTO: 34.4 % (ref 34–46.6)
HCT VFR BLD AUTO: 34.4 % (ref 34–46.6)
HCT VFR BLD AUTO: 34.6 % (ref 34–46.6)
HCT VFR BLD AUTO: 35.6 % (ref 34–46.6)
HCT VFR BLD AUTO: 35.8 % (ref 34–46.6)
HCT VFR BLD AUTO: 35.8 % (ref 34–46.6)
HCT VFR BLD AUTO: 36.2 % (ref 34–46.6)
HCT VFR BLD AUTO: 36.5 % (ref 34–46.6)
HEMODILUTION: NO
HGB BLD-MCNC: 10 G/DL (ref 12–15.9)
HGB BLD-MCNC: 10.1 G/DL (ref 12–15.9)
HGB BLD-MCNC: 10.2 G/DL (ref 12–15.9)
HGB BLD-MCNC: 10.4 G/DL (ref 12–15.9)
HGB BLD-MCNC: 10.4 G/DL (ref 12–15.9)
HGB BLD-MCNC: 10.5 G/DL (ref 12–15.9)
HGB BLD-MCNC: 10.6 G/DL (ref 12–15.9)
HGB BLD-MCNC: 10.6 G/DL (ref 12–15.9)
HGB BLD-MCNC: 10.7 G/DL (ref 12–15.9)
HGB BLD-MCNC: 10.8 G/DL (ref 12–15.9)
HGB BLD-MCNC: 10.9 G/DL (ref 12–15.9)
HGB BLD-MCNC: 11 G/DL (ref 12–15.9)
HGB BLD-MCNC: 11.2 G/DL (ref 12–15.9)
HGB BLD-MCNC: 11.4 G/DL (ref 12–15.9)
HGB BLD-MCNC: 8.3 G/DL (ref 12–15.9)
HGB BLD-MCNC: 8.3 G/DL (ref 12–15.9)
HGB BLD-MCNC: 8.6 G/DL (ref 12–15.9)
HGB BLD-MCNC: 8.7 G/DL (ref 12–15.9)
HGB BLD-MCNC: 8.8 G/DL (ref 12–15.9)
HGB BLD-MCNC: 8.8 G/DL (ref 12–15.9)
HGB BLD-MCNC: 8.9 G/DL (ref 12–15.9)
HGB BLD-MCNC: 9.1 G/DL (ref 12–15.9)
HGB BLD-MCNC: 9.2 G/DL (ref 12–15.9)
HGB BLD-MCNC: 9.3 G/DL (ref 12–15.9)
HGB BLD-MCNC: 9.4 G/DL (ref 12–15.9)
HGB BLD-MCNC: 9.5 G/DL (ref 12–15.9)
HGB BLD-MCNC: 9.5 G/DL (ref 12–15.9)
HGB BLD-MCNC: 9.6 G/DL (ref 12–15.9)
HGB BLD-MCNC: 9.6 G/DL (ref 12–15.9)
HGB BLD-MCNC: 9.7 G/DL (ref 12–15.9)
HGB BLD-MCNC: 9.8 G/DL (ref 12–15.9)
HGB BLD-MCNC: 9.8 G/DL (ref 12–15.9)
HGB BLD-MCNC: 9.9 G/DL (ref 12–15.9)
HGB BLD-MCNC: 9.9 G/DL (ref 12–15.9)
HGB UR QL STRIP.AUTO: ABNORMAL
HGB UR QL STRIP.AUTO: ABNORMAL
HGB UR QL STRIP.AUTO: NEGATIVE
HMPV RNA NPH QL NAA+NON-PROBE: NOT DETECTED
HOLD SPECIMEN: NORMAL
HPIV1 RNA ISLT QL NAA+PROBE: NOT DETECTED
HPIV1 RNA SPEC QL NAA+PROBE: NOT DETECTED
HPIV2 RNA SPEC QL NAA+PROBE: NOT DETECTED
HPIV3 RNA NPH QL NAA+PROBE: NOT DETECTED
HPIV4 P GENE NPH QL NAA+PROBE: NOT DETECTED
HYALINE CASTS UR QL AUTO: ABNORMAL /LPF
INHALED O2 CONCENTRATION: 44 %
INR PPP: 1.13 (ref 2–3)
INR PPP: 1.15 (ref 2–3)
INR PPP: 1.19 (ref 2–3)
INR PPP: 1.2 (ref 0.8–1.2)
INR PPP: 1.2 (ref 0.9–1.1)
INR PPP: 1.21 (ref 2–3)
INR PPP: 1.21 (ref 2–3)
INR PPP: 1.24 (ref 2–3)
INR PPP: 1.27 (ref 2–3)
INR PPP: 1.3 (ref 0.8–1.2)
INR PPP: 1.3 (ref 0.8–1.2)
INR PPP: 1.42 (ref 2–3)
INR PPP: 1.44 (ref 2–3)
INR PPP: 1.5 (ref 0.8–1.2)
INR PPP: 1.51 (ref 0.93–1.1)
INR PPP: 1.6
INR PPP: 1.68 (ref 2–3)
INR PPP: 1.9
INR PPP: 1.9 (ref 0.8–1.2)
INR PPP: 1.9 (ref 0.9–1.1)
INR PPP: 1.95 (ref 2–3)
INR PPP: 1.95 (ref 2–3)
INR PPP: 1.97 (ref 2–3)
INR PPP: 2 (ref 0.8–1.2)
INR PPP: 2.1
INR PPP: 2.13 (ref 2–3)
INR PPP: 2.2 (ref 0.8–1.2)
INR PPP: 2.23 (ref 2–3)
INR PPP: 2.32 (ref 2–3)
INR PPP: 2.4 (ref 0.8–1.2)
INR PPP: 2.5
INR PPP: 2.5 (ref 0.8–1.2)
INR PPP: 2.64 (ref 2–3)
INR PPP: 2.8
INR PPP: 2.8 (ref 0.8–1.2)
INR PPP: 2.8 (ref 0.9–1.1)
INR PPP: 2.89 (ref 0.9–1.1)
INR PPP: 2.89 (ref 2–3)
INR PPP: 2.9
INR PPP: 3 (ref 0.8–1.2)
INR PPP: 3.3 (ref 0.8–1.2)
INR PPP: 3.5
INR PPP: 3.8 (ref 0.8–1.2)
INR PPP: 4
INR PPP: 4.3 (ref 2–3)
INR PPP: 5.3 (ref 0.8–1.2)
INR PPP: 7
INR PPP: 7.54 (ref 2–3)
INR PPP: >8 (ref 2–3)
INR PPP: >=8 (ref 2–3)
IRON 24H UR-MRATE: 28 MCG/DL (ref 37–145)
IRON 24H UR-MRATE: 69 MCG/DL (ref 37–145)
IRON 24H UR-MRATE: 78 MCG/DL (ref 37–145)
IRON SATN MFR SERPL: 23 % (ref 20–50)
IRON SATN MFR SERPL: 35 % (ref 20–50)
IRON SATN MFR SERPL: 8 % (ref 20–50)
ISOLATED FROM: ABNORMAL
ISOLATED FROM: ABNORMAL
KETONES UR QL STRIP: NEGATIVE
L PNEUMO1 AG UR QL IA: NEGATIVE
L PNEUMO1 AG UR QL IA: NEGATIVE
LAB AP CASE REPORT: NORMAL
LAB AP DIAGNOSIS COMMENT: NORMAL
LAB AP DIAGNOSIS COMMENT: NORMAL
LAB AP FOUNDATION ONE, ADDENDUM: NORMAL
LDH SERPL-CCNC: 293 U/L (ref 135–214)
LDH SERPL-CCNC: 411 U/L (ref 135–214)
LDH SERPL-CCNC: 423 U/L (ref 135–214)
LDH SERPL-CCNC: 458 U/L (ref 135–214)
LEUKOCYTE ESTERASE UR QL STRIP.AUTO: NEGATIVE
LV EF 2D ECHO EST: 60 %
LYMPHOCYTES # BLD AUTO: 0.2 10*3/MM3 (ref 0.7–3.1)
LYMPHOCYTES # BLD AUTO: 0.3 10*3/MM3 (ref 0.7–3.1)
LYMPHOCYTES # BLD AUTO: 0.4 10*3/MM3 (ref 0.7–3.1)
LYMPHOCYTES # BLD AUTO: 0.5 10*3/MM3 (ref 0.7–3.1)
LYMPHOCYTES # BLD AUTO: 0.5 10*3/MM3 (ref 0.7–3.1)
LYMPHOCYTES # BLD AUTO: 0.56 10*3/MM3 (ref 0.7–3.1)
LYMPHOCYTES # BLD AUTO: 0.6 10*3/MM3 (ref 0.7–3.1)
LYMPHOCYTES # BLD AUTO: 0.6 10*3/MM3 (ref 0.7–3.1)
LYMPHOCYTES # BLD AUTO: 0.7 10*3/MM3 (ref 0.7–3.1)
LYMPHOCYTES # BLD AUTO: 0.8 10*3/MM3 (ref 0.7–3.1)
LYMPHOCYTES # BLD AUTO: 0.8 10*3/MM3 (ref 0.7–3.1)
LYMPHOCYTES # BLD AUTO: 0.82 10*3/MM3 (ref 0.7–3.1)
LYMPHOCYTES # BLD AUTO: 0.84 10*3/MM3 (ref 0.7–3.1)
LYMPHOCYTES # BLD AUTO: 0.85 10*3/MM3 (ref 0.7–3.1)
LYMPHOCYTES # BLD AUTO: 0.86 10*3/MM3 (ref 0.7–3.1)
LYMPHOCYTES # BLD AUTO: 0.91 10*3/MM3 (ref 0.7–3.1)
LYMPHOCYTES # BLD AUTO: 0.93 10*3/MM3 (ref 0.7–3.1)
LYMPHOCYTES # BLD AUTO: 0.94 10*3/MM3 (ref 0.7–3.1)
LYMPHOCYTES # BLD AUTO: 0.99 10*3/MM3 (ref 0.7–3.1)
LYMPHOCYTES # BLD AUTO: 1.05 10*3/MM3 (ref 0.7–3.1)
LYMPHOCYTES # BLD AUTO: 1.13 10*3/MM3 (ref 0.7–3.1)
LYMPHOCYTES # BLD AUTO: 1.17 10*3/MM3 (ref 0.7–3.1)
LYMPHOCYTES # BLD AUTO: 1.17 10*3/MM3 (ref 0.7–3.1)
LYMPHOCYTES # BLD AUTO: 1.18 10*3/MM3 (ref 0.7–3.1)
LYMPHOCYTES # BLD AUTO: 1.18 10*3/MM3 (ref 0.7–3.1)
LYMPHOCYTES # BLD AUTO: 1.2 10*3/MM3 (ref 0.7–3.1)
LYMPHOCYTES # BLD AUTO: 1.22 10*3/MM3 (ref 0.7–3.1)
LYMPHOCYTES # BLD AUTO: 1.25 10*3/MM3 (ref 0.7–3.1)
LYMPHOCYTES # BLD AUTO: 1.25 10*3/MM3 (ref 0.7–3.1)
LYMPHOCYTES # BLD AUTO: 1.27 10*3/MM3 (ref 0.7–3.1)
LYMPHOCYTES # BLD AUTO: 1.29 10*3/MM3 (ref 0.7–3.1)
LYMPHOCYTES # BLD AUTO: 1.3 10*3/MM3 (ref 0.7–3.1)
LYMPHOCYTES # BLD AUTO: 1.34 10*3/MM3 (ref 0.7–3.1)
LYMPHOCYTES # BLD AUTO: 1.39 10*3/MM3 (ref 0.7–3.1)
LYMPHOCYTES # BLD AUTO: 1.4 10*3/MM3 (ref 0.7–3.1)
LYMPHOCYTES # BLD AUTO: 1.47 10*3/MM3 (ref 0.7–3.1)
LYMPHOCYTES # BLD MANUAL: 1.04 10*3/MM3 (ref 0.7–3.1)
LYMPHOCYTES NFR BLD AUTO: 10 % (ref 19.6–45.3)
LYMPHOCYTES NFR BLD AUTO: 10.2 % (ref 19.6–45.3)
LYMPHOCYTES NFR BLD AUTO: 10.4 % (ref 19.6–45.3)
LYMPHOCYTES NFR BLD AUTO: 10.5 % (ref 19.6–45.3)
LYMPHOCYTES NFR BLD AUTO: 11.7 % (ref 19.6–45.3)
LYMPHOCYTES NFR BLD AUTO: 12.3 % (ref 19.6–45.3)
LYMPHOCYTES NFR BLD AUTO: 16 % (ref 19.6–45.3)
LYMPHOCYTES NFR BLD AUTO: 16.3 % (ref 19.6–45.3)
LYMPHOCYTES NFR BLD AUTO: 16.4 % (ref 19.6–45.3)
LYMPHOCYTES NFR BLD AUTO: 18.5 % (ref 19.6–45.3)
LYMPHOCYTES NFR BLD AUTO: 18.7 % (ref 19.6–45.3)
LYMPHOCYTES NFR BLD AUTO: 19.3 % (ref 19.6–45.3)
LYMPHOCYTES NFR BLD AUTO: 19.3 % (ref 19.6–45.3)
LYMPHOCYTES NFR BLD AUTO: 19.6 % (ref 19.6–45.3)
LYMPHOCYTES NFR BLD AUTO: 19.6 % (ref 19.6–45.3)
LYMPHOCYTES NFR BLD AUTO: 2.6 % (ref 19.6–45.3)
LYMPHOCYTES NFR BLD AUTO: 2.7 % (ref 19.6–45.3)
LYMPHOCYTES NFR BLD AUTO: 20.2 % (ref 19.6–45.3)
LYMPHOCYTES NFR BLD AUTO: 20.5 % (ref 19.6–45.3)
LYMPHOCYTES NFR BLD AUTO: 20.8 % (ref 19.6–45.3)
LYMPHOCYTES NFR BLD AUTO: 21 % (ref 19.6–45.3)
LYMPHOCYTES NFR BLD AUTO: 21 % (ref 19.6–45.3)
LYMPHOCYTES NFR BLD AUTO: 22.5 % (ref 19.6–45.3)
LYMPHOCYTES NFR BLD AUTO: 22.6 % (ref 19.6–45.3)
LYMPHOCYTES NFR BLD AUTO: 22.7 % (ref 19.6–45.3)
LYMPHOCYTES NFR BLD AUTO: 23 % (ref 19.6–45.3)
LYMPHOCYTES NFR BLD AUTO: 24.8 % (ref 19.6–45.3)
LYMPHOCYTES NFR BLD AUTO: 27.8 % (ref 19.6–45.3)
LYMPHOCYTES NFR BLD AUTO: 28.5 % (ref 19.6–45.3)
LYMPHOCYTES NFR BLD AUTO: 28.8 % (ref 19.6–45.3)
LYMPHOCYTES NFR BLD AUTO: 3.8 % (ref 19.6–45.3)
LYMPHOCYTES NFR BLD AUTO: 30.3 % (ref 19.6–45.3)
LYMPHOCYTES NFR BLD AUTO: 31.5 % (ref 19.6–45.3)
LYMPHOCYTES NFR BLD AUTO: 32.5 % (ref 19.6–45.3)
LYMPHOCYTES NFR BLD AUTO: 36.8 % (ref 19.6–45.3)
LYMPHOCYTES NFR BLD AUTO: 4.4 % (ref 19.6–45.3)
LYMPHOCYTES NFR BLD AUTO: 5.5 % (ref 19.6–45.3)
LYMPHOCYTES NFR BLD AUTO: 5.5 % (ref 19.6–45.3)
LYMPHOCYTES NFR BLD AUTO: 5.7 % (ref 19.6–45.3)
LYMPHOCYTES NFR BLD AUTO: 51.6 % (ref 19.6–45.3)
LYMPHOCYTES NFR BLD AUTO: 54.1 % (ref 19.6–45.3)
LYMPHOCYTES NFR BLD AUTO: 7.8 % (ref 19.6–45.3)
LYMPHOCYTES NFR BLD AUTO: 8 % (ref 19.6–45.3)
LYMPHOCYTES NFR BLD AUTO: 8.4 % (ref 19.6–45.3)
LYMPHOCYTES NFR BLD AUTO: 9.1 % (ref 19.6–45.3)
LYMPHOCYTES NFR BLD AUTO: 9.6 % (ref 19.6–45.3)
LYMPHOCYTES NFR BLD MANUAL: 20 % (ref 19.6–45.3)
LYMPHOCYTES NFR BLD MANUAL: 7 % (ref 5–12)
M PNEUMO IGG SER IA-ACNC: NOT DETECTED
MAGNESIUM SERPL-MCNC: 1 MG/DL (ref 1.6–2.4)
MAGNESIUM SERPL-MCNC: 1.2 MG/DL (ref 1.6–2.4)
MAGNESIUM SERPL-MCNC: 1.2 MG/DL (ref 1.6–2.4)
MAGNESIUM SERPL-MCNC: 1.4 MG/DL (ref 1.6–2.4)
MAGNESIUM SERPL-MCNC: 1.5 MG/DL (ref 1.6–2.4)
MAGNESIUM SERPL-MCNC: 1.6 MG/DL (ref 1.6–2.4)
MAGNESIUM SERPL-MCNC: 1.7 MG/DL (ref 1.6–2.4)
MAGNESIUM SERPL-MCNC: 1.9 MG/DL (ref 1.6–2.4)
MAXIMAL PREDICTED HEART RATE: 155 BPM
MAXIMAL PREDICTED HEART RATE: 155 BPM
MCH RBC QN AUTO: 25.9 PG (ref 26.6–33)
MCH RBC QN AUTO: 25.9 PG (ref 26.6–33)
MCH RBC QN AUTO: 26 PG (ref 26.6–33)
MCH RBC QN AUTO: 26.1 PG (ref 26.6–33)
MCH RBC QN AUTO: 26.1 PG (ref 26.6–33)
MCH RBC QN AUTO: 26.2 PG (ref 26.6–33)
MCH RBC QN AUTO: 26.4 PG (ref 26.6–33)
MCH RBC QN AUTO: 26.7 PG (ref 26.6–33)
MCH RBC QN AUTO: 26.7 PG (ref 26.6–33)
MCH RBC QN AUTO: 26.8 PG (ref 26.6–33)
MCH RBC QN AUTO: 27.3 PG (ref 26.6–33)
MCH RBC QN AUTO: 27.6 PG (ref 26.6–33)
MCH RBC QN AUTO: 27.8 PG (ref 26.6–33)
MCH RBC QN AUTO: 27.8 PG (ref 26.6–33)
MCH RBC QN AUTO: 28 PG (ref 26.6–33)
MCH RBC QN AUTO: 28.1 PG (ref 26.6–33)
MCH RBC QN AUTO: 28.2 PG (ref 26.6–33)
MCH RBC QN AUTO: 28.3 PG (ref 26.6–33)
MCH RBC QN AUTO: 28.4 PG (ref 26.6–33)
MCH RBC QN AUTO: 28.7 PG (ref 26.6–33)
MCH RBC QN AUTO: 28.8 PG (ref 26.6–33)
MCH RBC QN AUTO: 28.8 PG (ref 26.6–33)
MCH RBC QN AUTO: 28.9 PG (ref 26.6–33)
MCH RBC QN AUTO: 28.9 PG (ref 26.6–33)
MCH RBC QN AUTO: 29 PG (ref 26.6–33)
MCH RBC QN AUTO: 29 PG (ref 26.6–33)
MCH RBC QN AUTO: 29.1 PG (ref 26.6–33)
MCH RBC QN AUTO: 29.2 PG (ref 26.6–33)
MCH RBC QN AUTO: 29.2 PG (ref 26.6–33)
MCH RBC QN AUTO: 29.3 PG (ref 26.6–33)
MCH RBC QN AUTO: 29.4 PG (ref 26.6–33)
MCH RBC QN AUTO: 29.5 PG (ref 26.6–33)
MCH RBC QN AUTO: 29.6 PG (ref 26.6–33)
MCH RBC QN AUTO: 29.8 PG (ref 26.6–33)
MCHC RBC AUTO-ENTMCNC: 29.9 G/DL (ref 31.5–35.7)
MCHC RBC AUTO-ENTMCNC: 29.9 G/DL (ref 31.5–35.7)
MCHC RBC AUTO-ENTMCNC: 30.1 G/DL (ref 31.5–35.7)
MCHC RBC AUTO-ENTMCNC: 30.2 G/DL (ref 31.5–35.7)
MCHC RBC AUTO-ENTMCNC: 30.3 G/DL (ref 31.5–35.7)
MCHC RBC AUTO-ENTMCNC: 30.4 G/DL (ref 31.5–35.7)
MCHC RBC AUTO-ENTMCNC: 30.4 G/DL (ref 31.5–35.7)
MCHC RBC AUTO-ENTMCNC: 30.5 G/DL (ref 31.5–35.7)
MCHC RBC AUTO-ENTMCNC: 30.6 G/DL (ref 31.5–35.7)
MCHC RBC AUTO-ENTMCNC: 30.7 G/DL (ref 31.5–35.7)
MCHC RBC AUTO-ENTMCNC: 30.8 G/DL (ref 31.5–35.7)
MCHC RBC AUTO-ENTMCNC: 30.9 G/DL (ref 31.5–35.7)
MCHC RBC AUTO-ENTMCNC: 30.9 G/DL (ref 31.5–35.7)
MCHC RBC AUTO-ENTMCNC: 31 G/DL (ref 31.5–35.7)
MCHC RBC AUTO-ENTMCNC: 31.1 G/DL (ref 31.5–35.7)
MCHC RBC AUTO-ENTMCNC: 31.3 G/DL (ref 31.5–35.7)
MCHC RBC AUTO-ENTMCNC: 31.7 G/DL (ref 31.5–35.7)
MCHC RBC AUTO-ENTMCNC: 31.9 G/DL (ref 31.5–35.7)
MCHC RBC AUTO-ENTMCNC: 31.9 G/DL (ref 31.5–35.7)
MCHC RBC AUTO-ENTMCNC: 32 G/DL (ref 31.5–35.7)
MCHC RBC AUTO-ENTMCNC: 32.1 G/DL (ref 31.5–35.7)
MCHC RBC AUTO-ENTMCNC: 32.1 G/DL (ref 31.5–35.7)
MCHC RBC AUTO-ENTMCNC: 32.3 G/DL (ref 31.5–35.7)
MCHC RBC AUTO-ENTMCNC: 32.4 G/DL (ref 31.5–35.7)
MCHC RBC AUTO-ENTMCNC: 32.5 G/DL (ref 31.5–35.7)
MCHC RBC AUTO-ENTMCNC: 32.8 G/DL (ref 31.5–35.7)
MCHC RBC AUTO-ENTMCNC: 32.8 G/DL (ref 31.5–35.7)
MCHC RBC AUTO-ENTMCNC: 33 G/DL (ref 31.5–35.7)
MCHC RBC AUTO-ENTMCNC: 33.1 G/DL (ref 31.5–35.7)
MCHC RBC AUTO-ENTMCNC: 33.5 G/DL (ref 31.5–35.7)
MCHC RBC AUTO-ENTMCNC: 33.6 G/DL (ref 31.5–35.7)
MCV RBC AUTO: 80.6 FL (ref 79–97)
MCV RBC AUTO: 80.7 FL (ref 79–97)
MCV RBC AUTO: 80.9 FL (ref 79–97)
MCV RBC AUTO: 81.1 FL (ref 79–97)
MCV RBC AUTO: 81.3 FL (ref 79–97)
MCV RBC AUTO: 81.3 FL (ref 79–97)
MCV RBC AUTO: 83 FL (ref 79–97)
MCV RBC AUTO: 83.6 FL (ref 79–97)
MCV RBC AUTO: 83.8 FL (ref 79–97)
MCV RBC AUTO: 83.8 FL (ref 79–97)
MCV RBC AUTO: 84 FL (ref 79–97)
MCV RBC AUTO: 84.4 FL (ref 79–97)
MCV RBC AUTO: 85 FL (ref 79–97)
MCV RBC AUTO: 85.6 FL (ref 79–97)
MCV RBC AUTO: 86.7 FL (ref 79–97)
MCV RBC AUTO: 86.9 FL (ref 79–97)
MCV RBC AUTO: 87 FL (ref 79–97)
MCV RBC AUTO: 87.4 FL (ref 79–97)
MCV RBC AUTO: 87.4 FL (ref 79–97)
MCV RBC AUTO: 88.1 FL (ref 79–97)
MCV RBC AUTO: 89.2 FL (ref 79–97)
MCV RBC AUTO: 89.3 FL (ref 79–97)
MCV RBC AUTO: 90 FL (ref 79–97)
MCV RBC AUTO: 90.4 FL (ref 79–97)
MCV RBC AUTO: 90.9 FL (ref 79–97)
MCV RBC AUTO: 91.5 FL (ref 79–97)
MCV RBC AUTO: 92.2 FL (ref 79–97)
MCV RBC AUTO: 92.4 FL (ref 79–97)
MCV RBC AUTO: 92.4 FL (ref 79–97)
MCV RBC AUTO: 92.6 FL (ref 79–97)
MCV RBC AUTO: 92.6 FL (ref 79–97)
MCV RBC AUTO: 93.1 FL (ref 79–97)
MCV RBC AUTO: 93.4 FL (ref 79–97)
MCV RBC AUTO: 93.5 FL (ref 79–97)
MCV RBC AUTO: 94 FL (ref 79–97)
MCV RBC AUTO: 94.2 FL (ref 79–97)
MCV RBC AUTO: 94.7 FL (ref 79–97)
MCV RBC AUTO: 94.9 FL (ref 79–97)
MCV RBC AUTO: 95.3 FL (ref 79–97)
MCV RBC AUTO: 95.3 FL (ref 79–97)
MCV RBC AUTO: 95.4 FL (ref 79–97)
MCV RBC AUTO: 95.5 FL (ref 79–97)
MCV RBC AUTO: 95.6 FL (ref 79–97)
MCV RBC AUTO: 95.8 FL (ref 79–97)
MCV RBC AUTO: 95.8 FL (ref 79–97)
MCV RBC AUTO: 96.5 FL (ref 79–97)
MCV RBC AUTO: 97.5 FL (ref 79–97)
METAMYELOCYTES NFR BLD MANUAL: 1 % (ref 0–0)
MODALITY: ABNORMAL
MONOCYTES # BLD AUTO: 0 10*3/MM3 (ref 0.1–0.9)
MONOCYTES # BLD AUTO: 0.06 10*3/MM3 (ref 0.1–0.9)
MONOCYTES # BLD AUTO: 0.09 10*3/MM3 (ref 0.1–0.9)
MONOCYTES # BLD AUTO: 0.1 10*3/MM3 (ref 0.1–0.9)
MONOCYTES # BLD AUTO: 0.2 10*3/MM3 (ref 0.1–0.9)
MONOCYTES # BLD AUTO: 0.3 10*3/MM3 (ref 0.1–0.9)
MONOCYTES # BLD AUTO: 0.35 10*3/MM3 (ref 0.1–0.9)
MONOCYTES # BLD AUTO: 0.36 10*3/MM3 (ref 0.1–0.9)
MONOCYTES # BLD AUTO: 0.4 10*3/MM3 (ref 0.1–0.9)
MONOCYTES # BLD AUTO: 0.43 10*3/MM3 (ref 0.1–0.9)
MONOCYTES # BLD AUTO: 0.5 10*3/MM3 (ref 0.1–0.9)
MONOCYTES # BLD AUTO: 0.56 10*3/MM3 (ref 0.1–0.9)
MONOCYTES # BLD AUTO: 0.6 10*3/MM3 (ref 0.1–0.9)
MONOCYTES # BLD AUTO: 0.68 10*3/MM3 (ref 0.1–0.9)
MONOCYTES # BLD AUTO: 0.7 10*3/MM3 (ref 0.1–0.9)
MONOCYTES # BLD AUTO: 0.76 10*3/MM3 (ref 0.1–0.9)
MONOCYTES # BLD AUTO: 0.77 10*3/MM3 (ref 0.1–0.9)
MONOCYTES # BLD AUTO: 0.8 10*3/MM3 (ref 0.1–0.9)
MONOCYTES # BLD AUTO: 0.81 10*3/MM3 (ref 0.1–0.9)
MONOCYTES # BLD AUTO: 0.84 10*3/MM3 (ref 0.1–0.9)
MONOCYTES # BLD AUTO: 0.88 10*3/MM3 (ref 0.1–0.9)
MONOCYTES # BLD AUTO: 0.9 10*3/MM3 (ref 0.1–0.9)
MONOCYTES # BLD AUTO: 0.91 10*3/MM3 (ref 0.1–0.9)
MONOCYTES # BLD AUTO: 0.94 10*3/MM3 (ref 0.1–0.9)
MONOCYTES # BLD AUTO: 0.99 10*3/MM3 (ref 0.1–0.9)
MONOCYTES # BLD AUTO: 1 10*3/MM3 (ref 0.1–0.9)
MONOCYTES # BLD AUTO: 1.01 10*3/MM3 (ref 0.1–0.9)
MONOCYTES # BLD AUTO: 1.04 10*3/MM3 (ref 0.1–0.9)
MONOCYTES # BLD AUTO: 1.08 10*3/MM3 (ref 0.1–0.9)
MONOCYTES # BLD AUTO: 1.15 10*3/MM3 (ref 0.1–0.9)
MONOCYTES # BLD AUTO: 1.17 10*3/MM3 (ref 0.1–0.9)
MONOCYTES # BLD AUTO: 1.18 10*3/MM3 (ref 0.1–0.9)
MONOCYTES # BLD AUTO: 1.2 10*3/MM3 (ref 0.1–0.9)
MONOCYTES # BLD AUTO: 1.34 10*3/MM3 (ref 0.1–0.9)
MONOCYTES # BLD AUTO: 1.53 10*3/MM3 (ref 0.1–0.9)
MONOCYTES NFR BLD AUTO: 0.5 % (ref 5–12)
MONOCYTES NFR BLD AUTO: 0.9 % (ref 5–12)
MONOCYTES NFR BLD AUTO: 1 % (ref 5–12)
MONOCYTES NFR BLD AUTO: 1.3 % (ref 5–12)
MONOCYTES NFR BLD AUTO: 1.6 % (ref 5–12)
MONOCYTES NFR BLD AUTO: 10.9 % (ref 5–12)
MONOCYTES NFR BLD AUTO: 10.9 % (ref 5–12)
MONOCYTES NFR BLD AUTO: 11.2 % (ref 5–12)
MONOCYTES NFR BLD AUTO: 12.2 % (ref 5–12)
MONOCYTES NFR BLD AUTO: 12.5 % (ref 5–12)
MONOCYTES NFR BLD AUTO: 12.9 % (ref 5–12)
MONOCYTES NFR BLD AUTO: 13.4 % (ref 5–12)
MONOCYTES NFR BLD AUTO: 13.5 % (ref 5–12)
MONOCYTES NFR BLD AUTO: 13.8 % (ref 5–12)
MONOCYTES NFR BLD AUTO: 14.9 % (ref 5–12)
MONOCYTES NFR BLD AUTO: 15 % (ref 5–12)
MONOCYTES NFR BLD AUTO: 15.5 % (ref 5–12)
MONOCYTES NFR BLD AUTO: 15.9 % (ref 5–12)
MONOCYTES NFR BLD AUTO: 16.9 % (ref 5–12)
MONOCYTES NFR BLD AUTO: 17.7 % (ref 5–12)
MONOCYTES NFR BLD AUTO: 18 % (ref 5–12)
MONOCYTES NFR BLD AUTO: 18.6 % (ref 5–12)
MONOCYTES NFR BLD AUTO: 19.1 % (ref 5–12)
MONOCYTES NFR BLD AUTO: 19.8 % (ref 5–12)
MONOCYTES NFR BLD AUTO: 20 % (ref 5–12)
MONOCYTES NFR BLD AUTO: 20.1 % (ref 5–12)
MONOCYTES NFR BLD AUTO: 20.4 % (ref 5–12)
MONOCYTES NFR BLD AUTO: 24.2 % (ref 5–12)
MONOCYTES NFR BLD AUTO: 3.7 % (ref 5–12)
MONOCYTES NFR BLD AUTO: 3.8 % (ref 5–12)
MONOCYTES NFR BLD AUTO: 33.3 % (ref 5–12)
MONOCYTES NFR BLD AUTO: 4.5 % (ref 5–12)
MONOCYTES NFR BLD AUTO: 4.6 % (ref 5–12)
MONOCYTES NFR BLD AUTO: 5.1 % (ref 5–12)
MONOCYTES NFR BLD AUTO: 7.6 % (ref 5–12)
MONOCYTES NFR BLD AUTO: 8 % (ref 5–12)
MONOCYTES NFR BLD AUTO: 8.1 % (ref 5–12)
MONOCYTES NFR BLD AUTO: 8.4 % (ref 5–12)
MONOCYTES NFR BLD AUTO: 8.6 % (ref 5–12)
MONOCYTES NFR BLD AUTO: 8.7 % (ref 5–12)
MONOCYTES NFR BLD AUTO: 8.8 % (ref 5–12)
MONOCYTES NFR BLD AUTO: 8.9 % (ref 5–12)
MONOCYTES NFR BLD AUTO: 9 % (ref 5–12)
MONOCYTES NFR BLD AUTO: 9.5 % (ref 5–12)
MONOCYTES NFR BLD AUTO: 9.7 % (ref 5–12)
MONOCYTES NFR BLD AUTO: 9.9 % (ref 5–12)
MRSA DNA SPEC QL NAA+PROBE: NORMAL
MUCOUS THREADS URNS QL MICRO: ABNORMAL /HPF
MYCOBACTERIUM SPEC CULT: NORMAL
MYCOBACTERIUM SPEC CULT: NORMAL
NEUTROPHILS # BLD AUTO: 3.74 10*3/MM3 (ref 1.7–7)
NEUTROPHILS NFR BLD AUTO: 0.65 10*3/MM3 (ref 1.7–7)
NEUTROPHILS NFR BLD AUTO: 1.1 10*3/MM3 (ref 1.7–7)
NEUTROPHILS NFR BLD AUTO: 1.49 10*3/MM3 (ref 1.7–7)
NEUTROPHILS NFR BLD AUTO: 1.61 10*3/MM3 (ref 1.7–7)
NEUTROPHILS NFR BLD AUTO: 2 10*3/MM3 (ref 1.7–7)
NEUTROPHILS NFR BLD AUTO: 2.03 10*3/MM3 (ref 1.7–7)
NEUTROPHILS NFR BLD AUTO: 2.06 10*3/MM3 (ref 1.7–7)
NEUTROPHILS NFR BLD AUTO: 2.22 10*3/MM3 (ref 1.7–7)
NEUTROPHILS NFR BLD AUTO: 2.23 10*3/MM3 (ref 1.7–7)
NEUTROPHILS NFR BLD AUTO: 2.33 10*3/MM3 (ref 1.7–7)
NEUTROPHILS NFR BLD AUTO: 2.53 10*3/MM3 (ref 1.7–7)
NEUTROPHILS NFR BLD AUTO: 2.6 10*3/MM3 (ref 1.7–7)
NEUTROPHILS NFR BLD AUTO: 2.75 10*3/MM3 (ref 1.7–7)
NEUTROPHILS NFR BLD AUTO: 2.9 10*3/MM3 (ref 1.7–7)
NEUTROPHILS NFR BLD AUTO: 3 10*3/MM3 (ref 1.7–7)
NEUTROPHILS NFR BLD AUTO: 3 10*3/MM3 (ref 1.7–7)
NEUTROPHILS NFR BLD AUTO: 3.11 10*3/MM3 (ref 1.7–7)
NEUTROPHILS NFR BLD AUTO: 3.3 10*3/MM3 (ref 1.7–7)
NEUTROPHILS NFR BLD AUTO: 3.47 10*3/MM3 (ref 1.7–7)
NEUTROPHILS NFR BLD AUTO: 3.6 10*3/MM3 (ref 1.7–7)
NEUTROPHILS NFR BLD AUTO: 3.65 10*3/MM3 (ref 1.7–7)
NEUTROPHILS NFR BLD AUTO: 3.7 10*3/MM3 (ref 1.7–7)
NEUTROPHILS NFR BLD AUTO: 3.73 10*3/MM3 (ref 1.7–7)
NEUTROPHILS NFR BLD AUTO: 3.8 10*3/MM3 (ref 1.7–7)
NEUTROPHILS NFR BLD AUTO: 3.9 10*3/MM3 (ref 1.7–7)
NEUTROPHILS NFR BLD AUTO: 3.93 10*3/MM3 (ref 1.7–7)
NEUTROPHILS NFR BLD AUTO: 32.4 % (ref 42.7–76)
NEUTROPHILS NFR BLD AUTO: 38 % (ref 42.7–76)
NEUTROPHILS NFR BLD AUTO: 4 10*3/MM3 (ref 1.7–7)
NEUTROPHILS NFR BLD AUTO: 4.04 10*3/MM3 (ref 1.7–7)
NEUTROPHILS NFR BLD AUTO: 4.1 10*3/MM3 (ref 1.7–7)
NEUTROPHILS NFR BLD AUTO: 4.18 10*3/MM3 (ref 1.7–7)
NEUTROPHILS NFR BLD AUTO: 4.22 10*3/MM3 (ref 1.7–7)
NEUTROPHILS NFR BLD AUTO: 4.3 10*3/MM3 (ref 1.7–7)
NEUTROPHILS NFR BLD AUTO: 4.48 10*3/MM3 (ref 1.7–7)
NEUTROPHILS NFR BLD AUTO: 4.6 10*3/MM3 (ref 1.7–7)
NEUTROPHILS NFR BLD AUTO: 4.6 10*3/MM3 (ref 1.7–7)
NEUTROPHILS NFR BLD AUTO: 4.8 10*3/MM3 (ref 1.7–7)
NEUTROPHILS NFR BLD AUTO: 41.5 % (ref 42.7–76)
NEUTROPHILS NFR BLD AUTO: 47 % (ref 42.7–76)
NEUTROPHILS NFR BLD AUTO: 47.6 % (ref 42.7–76)
NEUTROPHILS NFR BLD AUTO: 49.9 % (ref 42.7–76)
NEUTROPHILS NFR BLD AUTO: 5 10*3/MM3 (ref 1.7–7)
NEUTROPHILS NFR BLD AUTO: 5.2 10*3/MM3 (ref 1.7–7)
NEUTROPHILS NFR BLD AUTO: 54.6 % (ref 42.7–76)
NEUTROPHILS NFR BLD AUTO: 55 % (ref 42.7–76)
NEUTROPHILS NFR BLD AUTO: 55.5 % (ref 42.7–76)
NEUTROPHILS NFR BLD AUTO: 55.9 % (ref 42.7–76)
NEUTROPHILS NFR BLD AUTO: 57.4 % (ref 42.7–76)
NEUTROPHILS NFR BLD AUTO: 57.6 % (ref 42.7–76)
NEUTROPHILS NFR BLD AUTO: 58.1 % (ref 42.7–76)
NEUTROPHILS NFR BLD AUTO: 58.3 % (ref 42.7–76)
NEUTROPHILS NFR BLD AUTO: 6 10*3/MM3 (ref 1.7–7)
NEUTROPHILS NFR BLD AUTO: 6.2 10*3/MM3 (ref 1.7–7)
NEUTROPHILS NFR BLD AUTO: 6.4 10*3/MM3 (ref 1.7–7)
NEUTROPHILS NFR BLD AUTO: 6.68 10*3/MM3 (ref 1.7–7)
NEUTROPHILS NFR BLD AUTO: 61.2 % (ref 42.7–76)
NEUTROPHILS NFR BLD AUTO: 61.6 % (ref 42.7–76)
NEUTROPHILS NFR BLD AUTO: 62.1 % (ref 42.7–76)
NEUTROPHILS NFR BLD AUTO: 62.5 % (ref 42.7–76)
NEUTROPHILS NFR BLD AUTO: 63.3 % (ref 42.7–76)
NEUTROPHILS NFR BLD AUTO: 63.4 % (ref 42.7–76)
NEUTROPHILS NFR BLD AUTO: 63.6 % (ref 42.7–76)
NEUTROPHILS NFR BLD AUTO: 64.1 % (ref 42.7–76)
NEUTROPHILS NFR BLD AUTO: 66.4 % (ref 42.7–76)
NEUTROPHILS NFR BLD AUTO: 67.8 % (ref 42.7–76)
NEUTROPHILS NFR BLD AUTO: 69.7 % (ref 42.7–76)
NEUTROPHILS NFR BLD AUTO: 7.13 10*3/MM3 (ref 1.7–7)
NEUTROPHILS NFR BLD AUTO: 7.3 10*3/MM3 (ref 1.7–7)
NEUTROPHILS NFR BLD AUTO: 7.6 10*3/MM3 (ref 1.7–7)
NEUTROPHILS NFR BLD AUTO: 7.85 10*3/MM3 (ref 1.7–7)
NEUTROPHILS NFR BLD AUTO: 74.8 % (ref 42.7–76)
NEUTROPHILS NFR BLD AUTO: 74.9 % (ref 42.7–76)
NEUTROPHILS NFR BLD AUTO: 75.2 % (ref 42.7–76)
NEUTROPHILS NFR BLD AUTO: 76.4 % (ref 42.7–76)
NEUTROPHILS NFR BLD AUTO: 78.6 % (ref 42.7–76)
NEUTROPHILS NFR BLD AUTO: 79.1 % (ref 42.7–76)
NEUTROPHILS NFR BLD AUTO: 79.4 % (ref 42.7–76)
NEUTROPHILS NFR BLD AUTO: 79.4 % (ref 42.7–76)
NEUTROPHILS NFR BLD AUTO: 80.8 % (ref 42.7–76)
NEUTROPHILS NFR BLD AUTO: 81.9 % (ref 42.7–76)
NEUTROPHILS NFR BLD AUTO: 82.1 % (ref 42.7–76)
NEUTROPHILS NFR BLD AUTO: 83 % (ref 42.7–76)
NEUTROPHILS NFR BLD AUTO: 84.4 % (ref 42.7–76)
NEUTROPHILS NFR BLD AUTO: 85.6 % (ref 42.7–76)
NEUTROPHILS NFR BLD AUTO: 85.8 % (ref 42.7–76)
NEUTROPHILS NFR BLD AUTO: 85.9 % (ref 42.7–76)
NEUTROPHILS NFR BLD AUTO: 87 % (ref 42.7–76)
NEUTROPHILS NFR BLD AUTO: 87.2 % (ref 42.7–76)
NEUTROPHILS NFR BLD AUTO: 88.3 % (ref 42.7–76)
NEUTROPHILS NFR BLD AUTO: 88.4 % (ref 42.7–76)
NEUTROPHILS NFR BLD AUTO: 95.7 % (ref 42.7–76)
NEUTROPHILS NFR BLD MANUAL: 70 % (ref 42.7–76)
NEUTS BAND NFR BLD MANUAL: 2 % (ref 0–5)
NIGHT BLUE STAIN TISS: NORMAL
NIGHT BLUE STAIN TISS: NORMAL
NITRITE UR QL STRIP: NEGATIVE
NRBC BLD AUTO-RTO: 0 /100 WBC (ref 0–0.2)
NRBC BLD AUTO-RTO: 0.1 /100 WBC (ref 0–0.2)
NRBC BLD AUTO-RTO: 0.2 /100 WBC (ref 0–0.2)
NRBC BLD AUTO-RTO: 0.3 /100 WBC (ref 0–0.2)
NRBC BLD AUTO-RTO: 0.3 /100 WBC (ref 0–0.2)
NRBC BLD AUTO-RTO: 0.4 /100 WBC (ref 0–0.2)
NRBC SPEC MANUAL: 2 /100 WBC (ref 0–0.2)
NT-PROBNP SERPL-MCNC: 192.3 PG/ML (ref 0–900)
NT-PROBNP SERPL-MCNC: 693 PG/ML (ref 0–900)
P JIROVECII DNA # SPEC NAA+PROBE: NEGATIVE {COPIES}/ML
P JIROVECII DNA # SPEC NAA+PROBE: NEGATIVE {COPIES}/ML
PATH REPORT.FINAL DX SPEC: NORMAL
PATH REPORT.GROSS SPEC: NORMAL
PCO2 BLDA: 28.2 MM HG (ref 35–48)
PH BLDA: 7.45 PH UNITS (ref 7.35–7.45)
PH UR STRIP.AUTO: 5.5 [PH] (ref 5–8)
PH UR STRIP.AUTO: 6 [PH] (ref 5–8)
PH UR STRIP.AUTO: 6 [PH] (ref 5–8)
PHOSPHATE SERPL-MCNC: 1.3 MG/DL (ref 2.5–4.5)
PHOSPHATE SERPL-MCNC: 1.4 MG/DL (ref 2.5–4.5)
PHOSPHATE SERPL-MCNC: 1.4 MG/DL (ref 2.5–4.5)
PHOSPHATE SERPL-MCNC: 1.5 MG/DL (ref 2.5–4.5)
PHOSPHATE SERPL-MCNC: 1.9 MG/DL (ref 2.5–4.5)
PHOSPHATE SERPL-MCNC: 2 MG/DL (ref 2.5–4.5)
PHOSPHATE SERPL-MCNC: 2.2 MG/DL (ref 2.5–4.5)
PHOSPHATE SERPL-MCNC: 2.2 MG/DL (ref 2.5–4.5)
PHOSPHATE SERPL-MCNC: 2.5 MG/DL (ref 2.5–4.5)
PHOSPHATE SERPL-MCNC: 2.9 MG/DL (ref 2.5–4.5)
PHOSPHATE SERPL-MCNC: 3.2 MG/DL (ref 2.5–4.5)
PHOSPHATE SERPL-MCNC: 3.2 MG/DL (ref 2.5–4.5)
PLAT MORPH BLD: NORMAL
PLATELET # BLD AUTO: 127 10*3/MM3 (ref 140–450)
PLATELET # BLD AUTO: 128 10*3/MM3 (ref 140–450)
PLATELET # BLD AUTO: 129 10*3/MM3 (ref 140–450)
PLATELET # BLD AUTO: 134 10*3/MM3 (ref 140–450)
PLATELET # BLD AUTO: 136 10*3/MM3 (ref 140–450)
PLATELET # BLD AUTO: 136 10*3/MM3 (ref 140–450)
PLATELET # BLD AUTO: 140 10*3/MM3 (ref 140–450)
PLATELET # BLD AUTO: 143 10*3/MM3 (ref 140–450)
PLATELET # BLD AUTO: 152 10*3/MM3 (ref 140–450)
PLATELET # BLD AUTO: 156 10*3/MM3 (ref 140–450)
PLATELET # BLD AUTO: 161 10*3/MM3 (ref 140–450)
PLATELET # BLD AUTO: 163 10*3/MM3 (ref 140–450)
PLATELET # BLD AUTO: 165 10*3/MM3 (ref 140–450)
PLATELET # BLD AUTO: 172 10*3/MM3 (ref 140–450)
PLATELET # BLD AUTO: 181 10*3/MM3 (ref 140–450)
PLATELET # BLD AUTO: 183 10*3/MM3 (ref 140–450)
PLATELET # BLD AUTO: 184 10*3/MM3 (ref 140–450)
PLATELET # BLD AUTO: 187 10*3/MM3 (ref 140–450)
PLATELET # BLD AUTO: 192 10*3/MM3 (ref 140–450)
PLATELET # BLD AUTO: 195 10*3/MM3 (ref 140–450)
PLATELET # BLD AUTO: 203 10*3/MM3 (ref 140–450)
PLATELET # BLD AUTO: 214 10*3/MM3 (ref 140–450)
PLATELET # BLD AUTO: 221 10*3/MM3 (ref 140–450)
PLATELET # BLD AUTO: 222 10*3/MM3 (ref 140–450)
PLATELET # BLD AUTO: 223 10*3/MM3 (ref 140–450)
PLATELET # BLD AUTO: 227 10*3/MM3 (ref 140–450)
PLATELET # BLD AUTO: 227 10*3/MM3 (ref 140–450)
PLATELET # BLD AUTO: 232 10*3/MM3 (ref 140–450)
PLATELET # BLD AUTO: 232 10*3/MM3 (ref 140–450)
PLATELET # BLD AUTO: 234 10*3/MM3 (ref 140–450)
PLATELET # BLD AUTO: 242 10*3/MM3 (ref 140–450)
PLATELET # BLD AUTO: 244 10*3/MM3 (ref 140–450)
PLATELET # BLD AUTO: 248 10*3/MM3 (ref 140–450)
PLATELET # BLD AUTO: 253 10*3/MM3 (ref 140–450)
PLATELET # BLD AUTO: 256 10*3/MM3 (ref 140–450)
PLATELET # BLD AUTO: 256 10*3/MM3 (ref 140–450)
PLATELET # BLD AUTO: 257 10*3/MM3 (ref 140–450)
PLATELET # BLD AUTO: 261 10*3/MM3 (ref 140–450)
PLATELET # BLD AUTO: 262 10*3/MM3 (ref 140–450)
PLATELET # BLD AUTO: 262 10*3/MM3 (ref 140–450)
PLATELET # BLD AUTO: 281 10*3/MM3 (ref 140–450)
PLATELET # BLD AUTO: 283 10*3/MM3 (ref 140–450)
PLATELET # BLD AUTO: 44 10*3/MM3 (ref 140–450)
PLATELET # BLD AUTO: 72 10*3/MM3 (ref 140–450)
PLATELET # BLD AUTO: 86 10*3/MM3 (ref 140–450)
PMV BLD AUTO: 10 FL (ref 6–12)
PMV BLD AUTO: 10 FL (ref 6–12)
PMV BLD AUTO: 10.1 FL (ref 6–12)
PMV BLD AUTO: 10.2 FL (ref 6–12)
PMV BLD AUTO: 10.3 FL (ref 6–12)
PMV BLD AUTO: 10.4 FL (ref 6–12)
PMV BLD AUTO: 10.5 FL (ref 6–12)
PMV BLD AUTO: 10.6 FL (ref 6–12)
PMV BLD AUTO: 10.7 FL (ref 6–12)
PMV BLD AUTO: 10.7 FL (ref 6–12)
PMV BLD AUTO: 10.8 FL (ref 6–12)
PMV BLD AUTO: 10.9 FL (ref 6–12)
PMV BLD AUTO: 10.9 FL (ref 6–12)
PMV BLD AUTO: 11.1 FL (ref 6–12)
PMV BLD AUTO: 11.1 FL (ref 6–12)
PMV BLD AUTO: 11.3 FL (ref 6–12)
PMV BLD AUTO: 11.4 FL (ref 6–12)
PMV BLD AUTO: 11.4 FL (ref 6–12)
PMV BLD AUTO: 11.5 FL (ref 6–12)
PMV BLD AUTO: 11.5 FL (ref 6–12)
PMV BLD AUTO: 11.8 FL (ref 6–12)
PMV BLD AUTO: 12.2 FL (ref 6–12)
PMV BLD AUTO: 8.1 FL (ref 6–12)
PMV BLD AUTO: 8.2 FL (ref 6–12)
PMV BLD AUTO: 8.2 FL (ref 6–12)
PMV BLD AUTO: 8.4 FL (ref 6–12)
PMV BLD AUTO: 8.5 FL (ref 6–12)
PMV BLD AUTO: 8.6 FL (ref 6–12)
PMV BLD AUTO: 8.6 FL (ref 6–12)
PMV BLD AUTO: 8.7 FL (ref 6–12)
PMV BLD AUTO: 8.7 FL (ref 6–12)
PMV BLD AUTO: 8.8 FL (ref 6–12)
PMV BLD AUTO: 8.8 FL (ref 6–12)
PMV BLD AUTO: 8.9 FL (ref 6–12)
PMV BLD AUTO: 9 FL (ref 6–12)
PMV BLD AUTO: 9.2 FL (ref 6–12)
PMV BLD AUTO: 9.4 FL (ref 6–12)
PMV BLD AUTO: 9.4 FL (ref 6–12)
PMV BLD AUTO: 9.6 FL (ref 6–12)
PMV BLD AUTO: 9.6 FL (ref 6–12)
PMV BLD AUTO: 9.9 FL (ref 6–12)
PO2 BLDA: 137.1 MM HG (ref 83–108)
POIKILOCYTOSIS BLD QL SMEAR: ABNORMAL
POTASSIUM SERPL-SCNC: 3.1 MMOL/L (ref 3.5–5.2)
POTASSIUM SERPL-SCNC: 3.2 MMOL/L (ref 3.5–5.2)
POTASSIUM SERPL-SCNC: 3.2 MMOL/L (ref 3.5–5.2)
POTASSIUM SERPL-SCNC: 3.3 MMOL/L (ref 3.5–5.2)
POTASSIUM SERPL-SCNC: 3.5 MMOL/L (ref 3.5–5.2)
POTASSIUM SERPL-SCNC: 3.6 MMOL/L (ref 3.5–5.2)
POTASSIUM SERPL-SCNC: 3.7 MMOL/L (ref 3.5–5.2)
POTASSIUM SERPL-SCNC: 3.7 MMOL/L (ref 3.5–5.2)
POTASSIUM SERPL-SCNC: 3.8 MMOL/L (ref 3.5–5.2)
POTASSIUM SERPL-SCNC: 3.9 MMOL/L (ref 3.5–5.2)
POTASSIUM SERPL-SCNC: 4 MMOL/L (ref 3.5–5.2)
POTASSIUM SERPL-SCNC: 4 MMOL/L (ref 3.5–5.2)
POTASSIUM SERPL-SCNC: 4.1 MMOL/L (ref 3.5–5.2)
POTASSIUM SERPL-SCNC: 4.3 MMOL/L (ref 3.5–5.2)
POTASSIUM SERPL-SCNC: 4.3 MMOL/L (ref 3.5–5.2)
POTASSIUM SERPL-SCNC: 4.4 MMOL/L (ref 3.5–5.2)
POTASSIUM SERPL-SCNC: 4.5 MMOL/L (ref 3.5–5.2)
POTASSIUM SERPL-SCNC: 4.8 MMOL/L (ref 3.5–5.2)
POTASSIUM SERPL-SCNC: 5.1 MMOL/L (ref 3.5–5.2)
POTASSIUM SERPL-SCNC: 5.1 MMOL/L (ref 3.5–5.2)
POTASSIUM SERPL-SCNC: 5.2 MMOL/L (ref 3.5–5.2)
POTASSIUM SERPL-SCNC: 5.6 MMOL/L (ref 3.5–5.2)
PROCALCITONIN SERPL-MCNC: 0.11 NG/ML (ref 0–0.25)
PROCALCITONIN SERPL-MCNC: 0.17 NG/ML (ref 0–0.25)
PROCALCITONIN SERPL-MCNC: 0.31 NG/ML (ref 0–0.25)
PROCALCITONIN SERPL-MCNC: 0.36 NG/ML (ref 0–0.25)
PROT SERPL-MCNC: 4.8 G/DL (ref 6–8.5)
PROT SERPL-MCNC: 5 G/DL (ref 6–8.5)
PROT SERPL-MCNC: 5.2 G/DL (ref 6–8.5)
PROT SERPL-MCNC: 5.6 G/DL (ref 6–8.5)
PROT SERPL-MCNC: 5.9 G/DL (ref 6–8.5)
PROT SERPL-MCNC: 6 G/DL (ref 6–8.5)
PROT SERPL-MCNC: 6 G/DL (ref 6–8.5)
PROT SERPL-MCNC: 6.1 G/DL (ref 6–8.5)
PROT SERPL-MCNC: 6.2 G/DL (ref 6–8.5)
PROT SERPL-MCNC: 6.3 G/DL (ref 6–8.5)
PROT SERPL-MCNC: 6.4 G/DL (ref 6–8.5)
PROT SERPL-MCNC: 6.4 G/DL (ref 6–8.5)
PROT SERPL-MCNC: 6.5 G/DL (ref 6–8.5)
PROT SERPL-MCNC: 6.6 G/DL (ref 6–8.5)
PROT SERPL-MCNC: 6.7 G/DL (ref 6–8.5)
PROT SERPL-MCNC: 6.9 G/DL (ref 6–8.5)
PROT SERPL-MCNC: 6.9 G/DL (ref 6–8.5)
PROT SERPL-MCNC: 7 G/DL (ref 6–8.5)
PROT SERPL-MCNC: 7 G/DL (ref 6–8.5)
PROT SERPL-MCNC: 7.1 G/DL (ref 6–8.5)
PROT SERPL-MCNC: 7.2 G/DL (ref 6–8.5)
PROT SERPL-MCNC: 7.4 G/DL (ref 6–8.5)
PROT UR QL STRIP: ABNORMAL
PROT UR QL STRIP: ABNORMAL
PROT UR QL STRIP: NEGATIVE
PROTHROMBIN TIME: 12.4 SECONDS (ref 19.4–28.5)
PROTHROMBIN TIME: 12.6 SECONDS (ref 19.4–28.5)
PROTHROMBIN TIME: 13 SECONDS (ref 19.4–28.5)
PROTHROMBIN TIME: 13.2 SECONDS (ref 19.4–28.5)
PROTHROMBIN TIME: 13.2 SECONDS (ref 19.4–28.5)
PROTHROMBIN TIME: 13.5 SECONDS (ref 19.4–28.5)
PROTHROMBIN TIME: 13.9 SECONDS (ref 19.4–28.5)
PROTHROMBIN TIME: 15.4 SECONDS (ref 19.4–28.5)
PROTHROMBIN TIME: 15.6 SECONDS (ref 19.4–28.5)
PROTHROMBIN TIME: 16.3 SECONDS (ref 9.6–11.7)
PROTHROMBIN TIME: 17.9 SECONDS (ref 19.4–28.5)
PROTHROMBIN TIME: 20.6 SECONDS (ref 19.4–28.5)
PROTHROMBIN TIME: 20.8 SECONDS (ref 19.4–28.5)
PROTHROMBIN TIME: 21 SECONDS (ref 19.4–28.5)
PROTHROMBIN TIME: 22.4 SECONDS (ref 19.4–28.5)
PROTHROMBIN TIME: 23.3 SECONDS (ref 19.4–28.5)
PROTHROMBIN TIME: 24.5 SECONDS (ref 19.4–28.5)
PROTHROMBIN TIME: 27.7 SECONDS (ref 19.4–28.5)
PROTHROMBIN TIME: 30.2 SECONDS (ref 19.4–28.5)
PROTHROMBIN TIME: 44.1 SECONDS (ref 19.4–28.5)
PROTHROMBIN TIME: 75.3 SECONDS (ref 19.4–28.5)
PROTHROMBIN TIME: 78.6 SECONDS (ref 19.4–28.5)
PROTHROMBIN TIME: 81.9 SECONDS (ref 19.4–28.5)
PROTHROMBIN TIME: 83.7 SECONDS (ref 19.4–28.5)
PROTHROMBIN TIME: 90 SECONDS (ref 19.4–28.5)
PROTHROMBIN TIME: >90 SECONDS (ref 19.4–28.5)
QT INTERVAL: 350 MS
QT INTERVAL: 357 MS
QT INTERVAL: 412 MS
QT INTERVAL: 419 MS
QT INTERVAL: 435 MS
QT INTERVAL: 450 MS
QT INTERVAL: 468 MS
RBC # BLD AUTO: 2.98 10*6/MM3 (ref 3.77–5.28)
RBC # BLD AUTO: 3.05 10*6/MM3 (ref 3.77–5.28)
RBC # BLD AUTO: 3.16 10*6/MM3 (ref 3.77–5.28)
RBC # BLD AUTO: 3.22 10*6/MM3 (ref 3.77–5.28)
RBC # BLD AUTO: 3.26 10*6/MM3 (ref 3.77–5.28)
RBC # BLD AUTO: 3.27 10*6/MM3 (ref 3.77–5.28)
RBC # BLD AUTO: 3.29 10*6/MM3 (ref 3.77–5.28)
RBC # BLD AUTO: 3.3 10*6/MM3 (ref 3.77–5.28)
RBC # BLD AUTO: 3.33 10*6/MM3 (ref 3.77–5.28)
RBC # BLD AUTO: 3.34 10*6/MM3 (ref 3.77–5.28)
RBC # BLD AUTO: 3.35 10*6/MM3 (ref 3.77–5.28)
RBC # BLD AUTO: 3.35 10*6/MM3 (ref 3.77–5.28)
RBC # BLD AUTO: 3.36 10*6/MM3 (ref 3.77–5.28)
RBC # BLD AUTO: 3.36 10*6/MM3 (ref 3.77–5.28)
RBC # BLD AUTO: 3.37 10*6/MM3 (ref 3.77–5.28)
RBC # BLD AUTO: 3.4 10*6/MM3 (ref 3.77–5.28)
RBC # BLD AUTO: 3.4 10*6/MM3 (ref 3.77–5.28)
RBC # BLD AUTO: 3.41 10*6/MM3 (ref 3.77–5.28)
RBC # BLD AUTO: 3.43 10*6/MM3 (ref 3.77–5.28)
RBC # BLD AUTO: 3.44 10*6/MM3 (ref 3.77–5.28)
RBC # BLD AUTO: 3.5 10*6/MM3 (ref 3.77–5.28)
RBC # BLD AUTO: 3.51 10*6/MM3 (ref 3.77–5.28)
RBC # BLD AUTO: 3.53 10*6/MM3 (ref 3.77–5.28)
RBC # BLD AUTO: 3.53 10*6/MM3 (ref 3.77–5.28)
RBC # BLD AUTO: 3.54 10*6/MM3 (ref 3.77–5.28)
RBC # BLD AUTO: 3.58 10*6/MM3 (ref 3.77–5.28)
RBC # BLD AUTO: 3.59 10*6/MM3 (ref 3.77–5.28)
RBC # BLD AUTO: 3.59 10*6/MM3 (ref 3.77–5.28)
RBC # BLD AUTO: 3.61 10*6/MM3 (ref 3.77–5.28)
RBC # BLD AUTO: 3.61 10*6/MM3 (ref 3.77–5.28)
RBC # BLD AUTO: 3.63 10*6/MM3 (ref 3.77–5.28)
RBC # BLD AUTO: 3.63 10*6/MM3 (ref 3.77–5.28)
RBC # BLD AUTO: 3.67 10*6/MM3 (ref 3.77–5.28)
RBC # BLD AUTO: 3.68 10*6/MM3 (ref 3.77–5.28)
RBC # BLD AUTO: 3.7 10*6/MM3 (ref 3.77–5.28)
RBC # BLD AUTO: 3.71 10*6/MM3 (ref 3.77–5.28)
RBC # BLD AUTO: 3.74 10*6/MM3 (ref 3.77–5.28)
RBC # BLD AUTO: 3.8 10*6/MM3 (ref 3.77–5.28)
RBC # BLD AUTO: 3.81 10*6/MM3 (ref 3.77–5.28)
RBC # BLD AUTO: 3.83 10*6/MM3 (ref 3.77–5.28)
RBC # BLD AUTO: 3.86 10*6/MM3 (ref 3.77–5.28)
RBC # BLD AUTO: 3.99 10*6/MM3 (ref 3.77–5.28)
RBC # BLD AUTO: 4.01 10*6/MM3 (ref 3.77–5.28)
RBC # BLD AUTO: 4.09 10*6/MM3 (ref 3.77–5.28)
RBC # BLD AUTO: 4.28 10*6/MM3 (ref 3.77–5.28)
RBC # UR STRIP: ABNORMAL /HPF
RBC # UR: ABNORMAL /HPF
RBC # UR: ABNORMAL /HPF
REF LAB TEST METHOD: ABNORMAL
RENAL EPI CELLS #/AREA URNS HPF: ABNORMAL /HPF
RHINOVIRUS RNA SPEC NAA+PROBE: NOT DETECTED
RSV RNA NPH QL NAA+NON-PROBE: NOT DETECTED
S PN DA SERO 19F IGG SER IA-MCNC: 8.5 UG/ML
S PNEUM AG SPEC QL LA: NEGATIVE
S PNEUM AG SPEC QL LA: NEGATIVE
S PNEUM DA 14 IGG SER IA-MCNC: >29.1 UG/ML
S PNEUM DA 18C IGG SER IA-MCNC: >16.7 UG/ML
S PNEUM DA 23F IGG SER IA-MCNC: 11.2 UG/ML
S PNEUM DA 4 IGG SER IA-MCNC: 0.8 UG/ML
S PNEUM DA 6B IGG SER IA-MCNC: 12.2 UG/ML
S PNEUM DA 9V IGG SER IA-MCNC: 10.9 UG/ML
S PYO AG THROAT QL: NEGATIVE
SAO2 % BLDCOA: 99.3 % (ref 94–98)
SARS-COV-2 ORF1AB RESP QL NAA+PROBE: NOT DETECTED
SARS-COV-2 RNA NPH QL NAA+NON-PROBE: NOT DETECTED
SARS-COV-2 RNA NPH QL NAA+NON-PROBE: NOT DETECTED
SCAN SLIDE: NORMAL
SODIUM SERPL-SCNC: 134 MMOL/L (ref 136–145)
SODIUM SERPL-SCNC: 134 MMOL/L (ref 136–145)
SODIUM SERPL-SCNC: 135 MMOL/L (ref 136–145)
SODIUM SERPL-SCNC: 135 MMOL/L (ref 136–145)
SODIUM SERPL-SCNC: 136 MMOL/L (ref 136–145)
SODIUM SERPL-SCNC: 137 MMOL/L (ref 136–145)
SODIUM SERPL-SCNC: 138 MMOL/L (ref 136–145)
SODIUM SERPL-SCNC: 138 MMOL/L (ref 136–145)
SODIUM SERPL-SCNC: 139 MMOL/L (ref 136–145)
SODIUM SERPL-SCNC: 140 MMOL/L (ref 136–145)
SODIUM SERPL-SCNC: 141 MMOL/L (ref 136–145)
SODIUM SERPL-SCNC: 142 MMOL/L (ref 136–145)
SODIUM SERPL-SCNC: 143 MMOL/L (ref 136–145)
SODIUM SERPL-SCNC: 144 MMOL/L (ref 136–145)
SODIUM SERPL-SCNC: 144 MMOL/L (ref 136–145)
SP GR UR STRIP: 1.02 (ref 1–1.03)
SPECIMEN SOURCE: NORMAL
SQUAMOUS #/AREA URNS HPF: ABNORMAL /HPF
STRESS TARGET HR: 132 BPM
STRESS TARGET HR: 132 BPM
THEOPHYLLINE SERPL-MCNC: <0.8 MCG/ML (ref 10–20)
TIBC SERPL-MCNC: 221 MCG/DL (ref 298–536)
TIBC SERPL-MCNC: 305 MCG/DL (ref 298–536)
TIBC SERPL-MCNC: 340 MCG/DL (ref 298–536)
TRANSFERRIN SERPL-MCNC: 148 MG/DL (ref 200–360)
TRANSFERRIN SERPL-MCNC: 205 MG/DL (ref 200–360)
TRANSFERRIN SERPL-MCNC: 228 MG/DL (ref 200–360)
TROPONIN T SERPL-MCNC: <0.01 NG/ML (ref 0–0.03)
UNIT  ABO: NORMAL
UNIT  ABO: NORMAL
UNIT  RH: NORMAL
UNIT  RH: NORMAL
UROBILINOGEN UR QL STRIP: ABNORMAL
VANCOMYCIN PEAK SERPL-MCNC: 24.7 MCG/ML (ref 20–40)
VANCOMYCIN TROUGH SERPL-MCNC: 14 MCG/ML (ref 5–20)
VIRUS SPEC CULT: NORMAL
VIRUS SPEC CULT: NORMAL
WBC # BLD AUTO: 1.57 10*3/MM3 (ref 3.4–10.8)
WBC # BLD AUTO: 2.8 10*3/MM3 (ref 3.4–10.8)
WBC # BLD AUTO: 3.38 10*3/MM3 (ref 3.4–10.8)
WBC # BLD AUTO: 3.5 10*3/MM3 (ref 3.4–10.8)
WBC # BLD AUTO: 3.6 10*3/MM3 (ref 3.4–10.8)
WBC # BLD AUTO: 3.69 10*3/MM3 (ref 3.4–10.8)
WBC # BLD AUTO: 3.7 10*3/MM3 (ref 3.4–10.8)
WBC # BLD AUTO: 3.72 10*3/MM3 (ref 3.4–10.8)
WBC # BLD AUTO: 4 10*3/MM3 (ref 3.4–10.8)
WBC # BLD AUTO: 4.04 10*3/MM3 (ref 3.4–10.8)
WBC # BLD AUTO: 4.2 10*3/MM3 (ref 3.4–10.8)
WBC # BLD AUTO: 4.3 10*3/MM3 (ref 3.4–10.8)
WBC # BLD AUTO: 4.3 10*3/MM3 (ref 3.4–10.8)
WBC # BLD AUTO: 4.34 10*3/MM3 (ref 3.4–10.8)
WBC # BLD AUTO: 4.59 10*3/MM3 (ref 3.4–10.8)
WBC # BLD AUTO: 4.67 10*3/MM3 (ref 3.4–10.8)
WBC # BLD AUTO: 4.75 10*3/MM3 (ref 3.4–10.8)
WBC # BLD AUTO: 4.77 10*3/MM3 (ref 3.4–10.8)
WBC # BLD AUTO: 4.8 10*3/MM3 (ref 3.4–10.8)
WBC # BLD AUTO: 5.15 10*3/MM3 (ref 3.4–10.8)
WBC # BLD AUTO: 5.2 10*3/MM3 (ref 3.4–10.8)
WBC # BLD AUTO: 5.2 10*3/MM3 (ref 3.4–10.8)
WBC # BLD AUTO: 5.38 10*3/MM3 (ref 3.4–10.8)
WBC # BLD AUTO: 5.5 10*3/MM3 (ref 3.4–10.8)
WBC # BLD AUTO: 5.52 10*3/MM3 (ref 3.4–10.8)
WBC # BLD AUTO: 5.6 10*3/MM3 (ref 3.4–10.8)
WBC # BLD AUTO: 5.8 10*3/MM3 (ref 3.4–10.8)
WBC # BLD AUTO: 5.97 10*3/MM3 (ref 3.4–10.8)
WBC # BLD AUTO: 6.05 10*3/MM3 (ref 3.4–10.8)
WBC # BLD AUTO: 6.1 10*3/MM3 (ref 3.4–10.8)
WBC # BLD AUTO: 6.2 10*3/MM3 (ref 3.4–10.8)
WBC # BLD AUTO: 6.21 10*3/MM3 (ref 3.4–10.8)
WBC # BLD AUTO: 6.3 10*3/MM3 (ref 3.4–10.8)
WBC # BLD AUTO: 6.37 10*3/MM3 (ref 3.4–10.8)
WBC # BLD AUTO: 6.39 10*3/MM3 (ref 3.4–10.8)
WBC # BLD AUTO: 6.42 10*3/MM3 (ref 3.4–10.8)
WBC # BLD AUTO: 6.6 10*3/MM3 (ref 3.4–10.8)
WBC # BLD AUTO: 8 10*3/MM3 (ref 3.4–10.8)
WBC # BLD AUTO: 8.94 10*3/MM3 (ref 3.4–10.8)
WBC # BLD AUTO: 9.02 10*3/MM3 (ref 3.4–10.8)
WBC # UR STRIP: ABNORMAL /HPF
WBC MORPH BLD: NORMAL
WBC NRBC COR # BLD: 4.2 10*3/MM3 (ref 3.4–10.8)
WBC NRBC COR # BLD: 5.9 10*3/MM3 (ref 3.4–10.8)
WBC NRBC COR # BLD: 7.4 10*3/MM3 (ref 3.4–10.8)
WBC NRBC COR # BLD: 7.6 10*3/MM3 (ref 3.4–10.8)
WBC NRBC COR # BLD: 8.5 10*3/MM3 (ref 3.4–10.8)
WBC NRBC COR # BLD: 8.8 10*3/MM3 (ref 3.4–10.8)
WBC NRBC COR # BLD: 9.89 10*3/MM3 (ref 3.4–10.8)
WBC UR QL AUTO: ABNORMAL /HPF
WBC UR QL AUTO: ABNORMAL /HPF
WHOLE BLOOD HOLD SPECIMEN: NORMAL

## 2021-01-01 PROCEDURE — 85025 COMPLETE CBC W/AUTO DIFF WBC: CPT | Performed by: INTERNAL MEDICINE

## 2021-01-01 PROCEDURE — 85025 COMPLETE CBC W/AUTO DIFF WBC: CPT | Performed by: NURSE PRACTITIONER

## 2021-01-01 PROCEDURE — 25010000002 ENOXAPARIN PER 10 MG: Performed by: SURGERY

## 2021-01-01 PROCEDURE — 87116 MYCOBACTERIA CULTURE: CPT | Performed by: INTERNAL MEDICINE

## 2021-01-01 PROCEDURE — 94799 UNLISTED PULMONARY SVC/PX: CPT

## 2021-01-01 PROCEDURE — 85610 PROTHROMBIN TIME: CPT

## 2021-01-01 PROCEDURE — G0463 HOSPITAL OUTPT CLINIC VISIT: HCPCS

## 2021-01-01 PROCEDURE — 99284 EMERGENCY DEPT VISIT MOD MDM: CPT

## 2021-01-01 PROCEDURE — 77412 RADIATION TX DELIVERY LVL 3: CPT | Performed by: RADIOLOGY

## 2021-01-01 PROCEDURE — 80053 COMPREHEN METABOLIC PANEL: CPT | Performed by: NURSE PRACTITIONER

## 2021-01-01 PROCEDURE — 85025 COMPLETE CBC W/AUTO DIFF WBC: CPT | Performed by: SURGERY

## 2021-01-01 PROCEDURE — 85610 PROTHROMBIN TIME: CPT | Performed by: INTERNAL MEDICINE

## 2021-01-01 PROCEDURE — 83735 ASSAY OF MAGNESIUM: CPT | Performed by: NURSE PRACTITIONER

## 2021-01-01 PROCEDURE — 36415 COLL VENOUS BLD VENIPUNCTURE: CPT

## 2021-01-01 PROCEDURE — 85025 COMPLETE CBC W/AUTO DIFF WBC: CPT

## 2021-01-01 PROCEDURE — 96372 THER/PROPH/DIAG INJ SC/IM: CPT

## 2021-01-01 PROCEDURE — 25010000002 ENOXAPARIN PER 10 MG: Performed by: NURSE PRACTITIONER

## 2021-01-01 PROCEDURE — 70553 MRI BRAIN STEM W/O & W/DYE: CPT

## 2021-01-01 PROCEDURE — 87496 CYTOMEG DNA AMP PROBE: CPT | Performed by: INTERNAL MEDICINE

## 2021-01-01 PROCEDURE — 25010000002 METHYLPREDNISOLONE PER 40 MG: Performed by: NURSE PRACTITIONER

## 2021-01-01 PROCEDURE — 87899 AGENT NOS ASSAY W/OPTIC: CPT | Performed by: NURSE PRACTITIONER

## 2021-01-01 PROCEDURE — 36415 COLL VENOUS BLD VENIPUNCTURE: CPT | Performed by: SURGERY

## 2021-01-01 PROCEDURE — G0378 HOSPITAL OBSERVATION PER HR: HCPCS

## 2021-01-01 PROCEDURE — 25010000002 HYDROMORPHONE PER 4 MG: Performed by: NURSE PRACTITIONER

## 2021-01-01 PROCEDURE — 71260 CT THORAX DX C+: CPT

## 2021-01-01 PROCEDURE — 25010000002 GEMCITABINE 200 MG/5.26ML SOLUTION 5.26 ML VIAL: Performed by: INTERNAL MEDICINE

## 2021-01-01 PROCEDURE — 71046 X-RAY EXAM CHEST 2 VIEWS: CPT

## 2021-01-01 PROCEDURE — 97116 GAIT TRAINING THERAPY: CPT

## 2021-01-01 PROCEDURE — 83735 ASSAY OF MAGNESIUM: CPT | Performed by: INTERNAL MEDICINE

## 2021-01-01 PROCEDURE — 86140 C-REACTIVE PROTEIN: CPT | Performed by: NURSE PRACTITIONER

## 2021-01-01 PROCEDURE — 93306 TTE W/DOPPLER COMPLETE: CPT

## 2021-01-01 PROCEDURE — 25010000002 VINORELBINE TARTRATE PER 10 MG: Performed by: INTERNAL MEDICINE

## 2021-01-01 PROCEDURE — 84100 ASSAY OF PHOSPHORUS: CPT | Performed by: NURSE PRACTITIONER

## 2021-01-01 PROCEDURE — 0 IOPAMIDOL PER 1 ML: Performed by: INTERNAL MEDICINE

## 2021-01-01 PROCEDURE — 85610 PROTHROMBIN TIME: CPT | Performed by: NURSE PRACTITIONER

## 2021-01-01 PROCEDURE — G6002 STEREOSCOPIC X-RAY GUIDANCE: HCPCS | Performed by: RADIOLOGY

## 2021-01-01 PROCEDURE — 99215 OFFICE O/P EST HI 40 MIN: CPT | Performed by: INTERNAL MEDICINE

## 2021-01-01 PROCEDURE — 25010000002 MAGNESIUM SULFATE PER 500 MG OF MAGNESIUM: Performed by: INTERNAL MEDICINE

## 2021-01-01 PROCEDURE — G0179 MD RECERTIFICATION HHA PT: HCPCS | Performed by: FAMILY MEDICINE

## 2021-01-01 PROCEDURE — 96375 TX/PRO/DX INJ NEW DRUG ADDON: CPT

## 2021-01-01 PROCEDURE — 85025 COMPLETE CBC W/AUTO DIFF WBC: CPT | Performed by: PHYSICIAN ASSISTANT

## 2021-01-01 PROCEDURE — 25010000002 HEPARIN LOCK FLUSH PER 10 UNITS: Performed by: INTERNAL MEDICINE

## 2021-01-01 PROCEDURE — 82550 ASSAY OF CK (CPK): CPT | Performed by: NURSE PRACTITIONER

## 2021-01-01 PROCEDURE — 99213 OFFICE O/P EST LOW 20 MIN: CPT | Performed by: FAMILY MEDICINE

## 2021-01-01 PROCEDURE — 25010000002 CEFTRIAXONE PER 250 MG: Performed by: INTERNAL MEDICINE

## 2021-01-01 PROCEDURE — 88342 IMHCHEM/IMCYTCHM 1ST ANTB: CPT | Performed by: INTERNAL MEDICINE

## 2021-01-01 PROCEDURE — 86304 IMMUNOASSAY TUMOR CA 125: CPT

## 2021-01-01 PROCEDURE — 99233 SBSQ HOSP IP/OBS HIGH 50: CPT | Performed by: INTERNAL MEDICINE

## 2021-01-01 PROCEDURE — 83540 ASSAY OF IRON: CPT | Performed by: NURSE PRACTITIONER

## 2021-01-01 PROCEDURE — 25010000002 EPOETIN ALFA-EPBX 40000 UNIT/ML SOLUTION: Performed by: INTERNAL MEDICINE

## 2021-01-01 PROCEDURE — 25010000002 DEXAMETHASONE SODIUM PHOSPHATE 10 MG/ML SOLUTION: Performed by: PHYSICIAN ASSISTANT

## 2021-01-01 PROCEDURE — 80053 COMPREHEN METABOLIC PANEL: CPT | Performed by: INTERNAL MEDICINE

## 2021-01-01 PROCEDURE — 93005 ELECTROCARDIOGRAM TRACING: CPT | Performed by: NURSE PRACTITIONER

## 2021-01-01 PROCEDURE — 25010000002 CALCIUM GLUCONATE-NACL 1-0.675 GM/50ML-% SOLUTION: Performed by: INTERNAL MEDICINE

## 2021-01-01 PROCEDURE — 25010000002 CEFTRIAXONE PER 250 MG: Performed by: NURSE PRACTITIONER

## 2021-01-01 PROCEDURE — 85652 RBC SED RATE AUTOMATED: CPT | Performed by: NURSE PRACTITIONER

## 2021-01-01 PROCEDURE — 77427 RADIATION TX MANAGEMENT X5: CPT | Performed by: RADIOLOGY

## 2021-01-01 PROCEDURE — 82565 ASSAY OF CREATININE: CPT

## 2021-01-01 PROCEDURE — 99232 SBSQ HOSP IP/OBS MODERATE 35: CPT | Performed by: INTERNAL MEDICINE

## 2021-01-01 PROCEDURE — 93005 ELECTROCARDIOGRAM TRACING: CPT | Performed by: EMERGENCY MEDICINE

## 2021-01-01 PROCEDURE — 25010000002 GADOTERIDOL PER 1 ML: Performed by: RADIOLOGY

## 2021-01-01 PROCEDURE — 25010000002 PROPOFOL 200 MG/20ML EMULSION

## 2021-01-01 PROCEDURE — 96367 TX/PROPH/DG ADDL SEQ IV INF: CPT

## 2021-01-01 PROCEDURE — 87071 CULTURE AEROBIC QUANT OTHER: CPT | Performed by: INTERNAL MEDICINE

## 2021-01-01 PROCEDURE — 97163 PT EVAL HIGH COMPLEX 45 MIN: CPT

## 2021-01-01 PROCEDURE — 25010000002 CISPLATIN PER 50 MG: Performed by: INTERNAL MEDICINE

## 2021-01-01 PROCEDURE — 36416 COLLJ CAPILLARY BLOOD SPEC: CPT

## 2021-01-01 PROCEDURE — 36591 DRAW BLOOD OFF VENOUS DEVICE: CPT

## 2021-01-01 PROCEDURE — 82330 ASSAY OF CALCIUM: CPT | Performed by: INTERNAL MEDICINE

## 2021-01-01 PROCEDURE — 25010000002 MAGNESIUM SULFATE 2 GM/50ML SOLUTION: Performed by: NURSE PRACTITIONER

## 2021-01-01 PROCEDURE — 96374 THER/PROPH/DIAG INJ IV PUSH: CPT

## 2021-01-01 PROCEDURE — 87633 RESP VIRUS 12-25 TARGETS: CPT | Performed by: INTERNAL MEDICINE

## 2021-01-01 PROCEDURE — 25010000002 CALCIUM GLUCONATE 2-0.675 GM/100ML-% SOLUTION: Performed by: INTERNAL MEDICINE

## 2021-01-01 PROCEDURE — 82728 ASSAY OF FERRITIN: CPT | Performed by: NURSE PRACTITIONER

## 2021-01-01 PROCEDURE — 25010000002 GEMCITABINE 1 GM/26.3ML SOLUTION 26.3 ML VIAL: Performed by: INTERNAL MEDICINE

## 2021-01-01 PROCEDURE — 0 IOPAMIDOL PER 1 ML: Performed by: NURSE PRACTITIONER

## 2021-01-01 PROCEDURE — 96361 HYDRATE IV INFUSION ADD-ON: CPT

## 2021-01-01 PROCEDURE — 87252 VIRUS INOCULATION TISSUE: CPT | Performed by: INTERNAL MEDICINE

## 2021-01-01 PROCEDURE — 96413 CHEMO IV INFUSION 1 HR: CPT

## 2021-01-01 PROCEDURE — 0100U HC BIOFIRE FILMARRAY RESP PANEL 2: CPT | Performed by: INTERNAL MEDICINE

## 2021-01-01 PROCEDURE — 85379 FIBRIN DEGRADATION QUANT: CPT | Performed by: NURSE PRACTITIONER

## 2021-01-01 PROCEDURE — 84466 ASSAY OF TRANSFERRIN: CPT | Performed by: INTERNAL MEDICINE

## 2021-01-01 PROCEDURE — 96376 TX/PRO/DX INJ SAME DRUG ADON: CPT

## 2021-01-01 PROCEDURE — P9612 CATHETERIZE FOR URINE SPEC: HCPCS

## 2021-01-01 PROCEDURE — 77295 3-D RADIOTHERAPY PLAN: CPT | Performed by: RADIOLOGY

## 2021-01-01 PROCEDURE — 25010000003 HEPARIN LOCK FLUSH PER 10 UNITS: Performed by: INTERNAL MEDICINE

## 2021-01-01 PROCEDURE — 25010000002 PALONOSETRON 0.25 MG/5ML SOLUTION PREFILLED SYRINGE: Performed by: INTERNAL MEDICINE

## 2021-01-01 PROCEDURE — 71250 CT THORAX DX C-: CPT

## 2021-01-01 PROCEDURE — 80048 BASIC METABOLIC PNL TOTAL CA: CPT | Performed by: EMERGENCY MEDICINE

## 2021-01-01 PROCEDURE — G0180 MD CERTIFICATION HHA PATIENT: HCPCS | Performed by: FAMILY MEDICINE

## 2021-01-01 PROCEDURE — 77387 GUIDANCE FOR RADJ TX DLVR: CPT | Performed by: RADIOLOGY

## 2021-01-01 PROCEDURE — 87102 FUNGUS ISOLATION CULTURE: CPT | Performed by: INTERNAL MEDICINE

## 2021-01-01 PROCEDURE — 88108 CYTOPATH CONCENTRATE TECH: CPT | Performed by: INTERNAL MEDICINE

## 2021-01-01 PROCEDURE — 84484 ASSAY OF TROPONIN QUANT: CPT | Performed by: NURSE PRACTITIONER

## 2021-01-01 PROCEDURE — 80048 BASIC METABOLIC PNL TOTAL CA: CPT | Performed by: NURSE PRACTITIONER

## 2021-01-01 PROCEDURE — 82330 ASSAY OF CALCIUM: CPT

## 2021-01-01 PROCEDURE — 25010000003 PHYTONADIONE 10 MG/ML SOLUTION: Performed by: NURSE PRACTITIONER

## 2021-01-01 PROCEDURE — 25010000002 DENOSUMAB 120 MG/1.7ML SOLUTION: Performed by: INTERNAL MEDICINE

## 2021-01-01 PROCEDURE — 25010000003 PHYTONADIONE 10 MG/ML SOLUTION 1 ML AMPULE: Performed by: INTERNAL MEDICINE

## 2021-01-01 PROCEDURE — 81001 URINALYSIS AUTO W/SCOPE: CPT | Performed by: UROLOGY

## 2021-01-01 PROCEDURE — 86304 IMMUNOASSAY TUMOR CA 125: CPT | Performed by: INTERNAL MEDICINE

## 2021-01-01 PROCEDURE — 25010000002 DEXAMETHASONE SODIUM PHOSPHATE 120 MG/30ML SOLUTION: Performed by: INTERNAL MEDICINE

## 2021-01-01 PROCEDURE — 82728 ASSAY OF FERRITIN: CPT | Performed by: INTERNAL MEDICINE

## 2021-01-01 PROCEDURE — 36590 REMOVAL TUNNELED CV CATH: CPT | Performed by: SURGERY

## 2021-01-01 PROCEDURE — 25010000002 MICAFUNGIN SODIUM 100 MG RECONSTITUTED SOLUTION 1 EACH VIAL: Performed by: INTERNAL MEDICINE

## 2021-01-01 PROCEDURE — 77280 THER RAD SIMULAJ FIELD SMPL: CPT | Performed by: RADIOLOGY

## 2021-01-01 PROCEDURE — 80053 COMPREHEN METABOLIC PANEL: CPT | Performed by: PHYSICIAN ASSISTANT

## 2021-01-01 PROCEDURE — 99285 EMERGENCY DEPT VISIT HI MDM: CPT

## 2021-01-01 PROCEDURE — 93306 TTE W/DOPPLER COMPLETE: CPT | Performed by: INTERNAL MEDICINE

## 2021-01-01 PROCEDURE — 25010000002 FOSAPREPITANT PER 1 MG: Performed by: INTERNAL MEDICINE

## 2021-01-01 PROCEDURE — 99213 OFFICE O/P EST LOW 20 MIN: CPT | Performed by: RADIOLOGY

## 2021-01-01 PROCEDURE — 99225 PR SBSQ OBSERVATION CARE/DAY 25 MINUTES: CPT | Performed by: INTERNAL MEDICINE

## 2021-01-01 PROCEDURE — 84145 PROCALCITONIN (PCT): CPT | Performed by: NURSE PRACTITIONER

## 2021-01-01 PROCEDURE — 81001 URINALYSIS AUTO W/SCOPE: CPT | Performed by: NURSE PRACTITIONER

## 2021-01-01 PROCEDURE — 99239 HOSP IP/OBS DSCHRG MGMT >30: CPT | Performed by: STUDENT IN AN ORGANIZED HEALTH CARE EDUCATION/TRAINING PROGRAM

## 2021-01-01 PROCEDURE — 99214 OFFICE O/P EST MOD 30 MIN: CPT | Performed by: FAMILY MEDICINE

## 2021-01-01 PROCEDURE — 25010000002 PROPOFOL 10 MG/ML EMULSION: Performed by: ANESTHESIOLOGY

## 2021-01-01 PROCEDURE — 0202U NFCT DS 22 TRGT SARS-COV-2: CPT | Performed by: NURSE PRACTITIONER

## 2021-01-01 PROCEDURE — 87206 SMEAR FLUORESCENT/ACID STAI: CPT | Performed by: INTERNAL MEDICINE

## 2021-01-01 PROCEDURE — 25010000002 HEPARIN LOCK FLUSH PER 10 UNITS: Performed by: STUDENT IN AN ORGANIZED HEALTH CARE EDUCATION/TRAINING PROGRAM

## 2021-01-01 PROCEDURE — 97162 PT EVAL MOD COMPLEX 30 MIN: CPT

## 2021-01-01 PROCEDURE — 0202U NFCT DS 22 TRGT SARS-COV-2: CPT | Performed by: EMERGENCY MEDICINE

## 2021-01-01 PROCEDURE — 99213 OFFICE O/P EST LOW 20 MIN: CPT | Performed by: PHYSICAL MEDICINE & REHABILITATION

## 2021-01-01 PROCEDURE — 74177 CT ABD & PELVIS W/CONTRAST: CPT

## 2021-01-01 PROCEDURE — 87205 SMEAR GRAM STAIN: CPT | Performed by: SURGERY

## 2021-01-01 PROCEDURE — 80053 COMPREHEN METABOLIC PANEL: CPT

## 2021-01-01 PROCEDURE — 25010000003 PHYTONADIONE 10 MG/ML SOLUTION 1 ML AMPULE: Performed by: PHYSICIAN ASSISTANT

## 2021-01-01 PROCEDURE — 99214 OFFICE O/P EST MOD 30 MIN: CPT | Performed by: INTERNAL MEDICINE

## 2021-01-01 PROCEDURE — 36430 TRANSFUSION BLD/BLD COMPNT: CPT

## 2021-01-01 PROCEDURE — 85025 COMPLETE CBC W/AUTO DIFF WBC: CPT | Performed by: EMERGENCY MEDICINE

## 2021-01-01 PROCEDURE — 87150 DNA/RNA AMPLIFIED PROBE: CPT | Performed by: NURSE PRACTITIONER

## 2021-01-01 PROCEDURE — 25010000002 PROPOFOL 500 MG/50ML EMULSION: Performed by: ANESTHESIOLOGY

## 2021-01-01 PROCEDURE — 25010000002 MICAFUNGIN PER 1 MG: Performed by: INTERNAL MEDICINE

## 2021-01-01 PROCEDURE — 70491 CT SOFT TISSUE NECK W/DYE: CPT

## 2021-01-01 PROCEDURE — 99222 1ST HOSP IP/OBS MODERATE 55: CPT | Performed by: INTERNAL MEDICINE

## 2021-01-01 PROCEDURE — 83605 ASSAY OF LACTIC ACID: CPT

## 2021-01-01 PROCEDURE — 87899 AGENT NOS ASSAY W/OPTIC: CPT | Performed by: INTERNAL MEDICINE

## 2021-01-01 PROCEDURE — 25010000002 CYANOCOBALAMIN PER 1000 MCG: Performed by: INTERNAL MEDICINE

## 2021-01-01 PROCEDURE — U0005 INFEC AGEN DETEC AMPLI PROBE: HCPCS | Performed by: INTERNAL MEDICINE

## 2021-01-01 PROCEDURE — 77336 RADIATION PHYSICS CONSULT: CPT | Performed by: RADIOLOGY

## 2021-01-01 PROCEDURE — 77300 RADIATION THERAPY DOSE PLAN: CPT | Performed by: RADIOLOGY

## 2021-01-01 PROCEDURE — 25010000002 DEXAMETHASONE PER 1 MG: Performed by: NURSE PRACTITIONER

## 2021-01-01 PROCEDURE — 87641 MR-STAPH DNA AMP PROBE: CPT | Performed by: INTERNAL MEDICINE

## 2021-01-01 PROCEDURE — 96366 THER/PROPH/DIAG IV INF ADDON: CPT

## 2021-01-01 PROCEDURE — 96417 CHEMO IV INFUS EACH ADDL SEQ: CPT

## 2021-01-01 PROCEDURE — 80048 BASIC METABOLIC PNL TOTAL CA: CPT | Performed by: INTERNAL MEDICINE

## 2021-01-01 PROCEDURE — 25010000002 MAGNESIUM SULFATE IN D5W 1G/100ML (PREMIX) 1-5 GM/100ML-% SOLUTION: Performed by: INTERNAL MEDICINE

## 2021-01-01 PROCEDURE — 92610 EVALUATE SWALLOWING FUNCTION: CPT

## 2021-01-01 PROCEDURE — 87798 DETECT AGENT NOS DNA AMP: CPT | Performed by: INTERNAL MEDICINE

## 2021-01-01 PROCEDURE — 85610 PROTHROMBIN TIME: CPT | Performed by: EMERGENCY MEDICINE

## 2021-01-01 PROCEDURE — 84484 ASSAY OF TROPONIN QUANT: CPT | Performed by: EMERGENCY MEDICINE

## 2021-01-01 PROCEDURE — 84145 PROCALCITONIN (PCT): CPT | Performed by: PHYSICIAN ASSISTANT

## 2021-01-01 PROCEDURE — 97110 THERAPEUTIC EXERCISES: CPT

## 2021-01-01 PROCEDURE — 96360 HYDRATION IV INFUSION INIT: CPT

## 2021-01-01 PROCEDURE — 83735 ASSAY OF MAGNESIUM: CPT | Performed by: PHYSICIAN ASSISTANT

## 2021-01-01 PROCEDURE — 86140 C-REACTIVE PROTEIN: CPT | Performed by: INTERNAL MEDICINE

## 2021-01-01 PROCEDURE — 71045 X-RAY EXAM CHEST 1 VIEW: CPT

## 2021-01-01 PROCEDURE — 87186 SC STD MICRODIL/AGAR DIL: CPT | Performed by: NURSE PRACTITIONER

## 2021-01-01 PROCEDURE — 99219 PR INITIAL OBSERVATION CARE/DAY 50 MINUTES: CPT | Performed by: NURSE PRACTITIONER

## 2021-01-01 PROCEDURE — 77334 RADIATION TREATMENT AID(S): CPT | Performed by: RADIOLOGY

## 2021-01-01 PROCEDURE — 87633 RESP VIRUS 12-25 TARGETS: CPT | Performed by: NURSE PRACTITIONER

## 2021-01-01 PROCEDURE — 87040 BLOOD CULTURE FOR BACTERIA: CPT | Performed by: SURGERY

## 2021-01-01 PROCEDURE — 94640 AIRWAY INHALATION TREATMENT: CPT

## 2021-01-01 PROCEDURE — G0463 HOSPITAL OUTPT CLINIC VISIT: HCPCS | Performed by: RADIOLOGY

## 2021-01-01 PROCEDURE — 70450 CT HEAD/BRAIN W/O DYE: CPT

## 2021-01-01 PROCEDURE — 82248 BILIRUBIN DIRECT: CPT | Performed by: NURSE PRACTITIONER

## 2021-01-01 PROCEDURE — 82803 BLOOD GASES ANY COMBINATION: CPT

## 2021-01-01 PROCEDURE — A9579 GAD-BASE MR CONTRAST NOS,1ML: HCPCS | Performed by: RADIOLOGY

## 2021-01-01 PROCEDURE — 83615 LACTATE (LD) (LDH) ENZYME: CPT | Performed by: PHYSICIAN ASSISTANT

## 2021-01-01 PROCEDURE — 82728 ASSAY OF FERRITIN: CPT | Performed by: PHYSICIAN ASSISTANT

## 2021-01-01 PROCEDURE — 81001 URINALYSIS AUTO W/SCOPE: CPT | Performed by: PHYSICIAN ASSISTANT

## 2021-01-01 PROCEDURE — 25010000002 MICAFUNGIN SODIUM 100 MG RECONSTITUTED SOLUTION 1 EACH VIAL: Performed by: SURGERY

## 2021-01-01 PROCEDURE — 88341 IMHCHEM/IMCYTCHM EA ADD ANTB: CPT | Performed by: SURGERY

## 2021-01-01 PROCEDURE — 99203 OFFICE O/P NEW LOW 30 MIN: CPT | Performed by: SURGERY

## 2021-01-01 PROCEDURE — 87075 CULTR BACTERIA EXCEPT BLOOD: CPT | Performed by: SURGERY

## 2021-01-01 PROCEDURE — 84100 ASSAY OF PHOSPHORUS: CPT | Performed by: INTERNAL MEDICINE

## 2021-01-01 PROCEDURE — 36415 COLL VENOUS BLD VENIPUNCTURE: CPT | Performed by: NURSE PRACTITIONER

## 2021-01-01 PROCEDURE — FACE2FACE: Performed by: RADIOLOGY

## 2021-01-01 PROCEDURE — 88342 IMHCHEM/IMCYTCHM 1ST ANTB: CPT | Performed by: SURGERY

## 2021-01-01 PROCEDURE — 63710000001 DEXAMETHASONE PER 0.25 MG: Performed by: NURSE PRACTITIONER

## 2021-01-01 PROCEDURE — 99222 1ST HOSP IP/OBS MODERATE 55: CPT | Performed by: NURSE PRACTITIONER

## 2021-01-01 PROCEDURE — 63710000001 ONDANSETRON PER 8 MG: Performed by: NURSE PRACTITIONER

## 2021-01-01 PROCEDURE — 93005 ELECTROCARDIOGRAM TRACING: CPT

## 2021-01-01 PROCEDURE — 25010000002 VANCOMYCIN 1 G RECONSTITUTED SOLUTION 1 EACH VIAL: Performed by: INTERNAL MEDICINE

## 2021-01-01 PROCEDURE — 87086 URINE CULTURE/COLONY COUNT: CPT | Performed by: UROLOGY

## 2021-01-01 PROCEDURE — 25010000002 MICAFUNGIN PER 1 MG: Performed by: SURGERY

## 2021-01-01 PROCEDURE — 99223 1ST HOSP IP/OBS HIGH 75: CPT | Performed by: NURSE PRACTITIONER

## 2021-01-01 PROCEDURE — 83880 ASSAY OF NATRIURETIC PEPTIDE: CPT | Performed by: NURSE PRACTITIONER

## 2021-01-01 PROCEDURE — 99221 1ST HOSP IP/OBS SF/LOW 40: CPT | Performed by: SURGERY

## 2021-01-01 PROCEDURE — 0 MAGNESIUM SULFATE 4 GM/100ML SOLUTION: Performed by: INTERNAL MEDICINE

## 2021-01-01 PROCEDURE — 93010 ELECTROCARDIOGRAM REPORT: CPT | Performed by: INTERNAL MEDICINE

## 2021-01-01 PROCEDURE — 36600 WITHDRAWAL OF ARTERIAL BLOOD: CPT

## 2021-01-01 PROCEDURE — 87070 CULTURE OTHR SPECIMN AEROBIC: CPT | Performed by: SURGERY

## 2021-01-01 PROCEDURE — 25010000003 PHYTONADIONE 10 MG/ML SOLUTION 1 ML AMPULE: Performed by: NURSE PRACTITIONER

## 2021-01-01 PROCEDURE — 96368 THER/DIAG CONCURRENT INF: CPT

## 2021-01-01 PROCEDURE — 87040 BLOOD CULTURE FOR BACTERIA: CPT | Performed by: PHYSICIAN ASSISTANT

## 2021-01-01 PROCEDURE — 99214 OFFICE O/P EST MOD 30 MIN: CPT | Performed by: RADIOLOGY

## 2021-01-01 PROCEDURE — 0B9F8ZX DRAINAGE OF RIGHT LOWER LUNG LOBE, VIA NATURAL OR ARTIFICIAL OPENING ENDOSCOPIC, DIAGNOSTIC: ICD-10-PCS | Performed by: INTERNAL MEDICINE

## 2021-01-01 PROCEDURE — 85730 THROMBOPLASTIN TIME PARTIAL: CPT | Performed by: PHYSICIAN ASSISTANT

## 2021-01-01 PROCEDURE — 80048 BASIC METABOLIC PNL TOTAL CA: CPT | Performed by: SURGERY

## 2021-01-01 PROCEDURE — 83615 LACTATE (LD) (LDH) ENZYME: CPT | Performed by: NURSE PRACTITIONER

## 2021-01-01 PROCEDURE — 25010000002 CEFTRIAXONE PER 250 MG: Performed by: SURGERY

## 2021-01-01 PROCEDURE — XW033E5 INTRODUCTION OF REMDESIVIR ANTI-INFECTIVE INTO PERIPHERAL VEIN, PERCUTANEOUS APPROACH, NEW TECHNOLOGY GROUP 5: ICD-10-PCS | Performed by: STUDENT IN AN ORGANIZED HEALTH CARE EDUCATION/TRAINING PROGRAM

## 2021-01-01 PROCEDURE — 86927 PLASMA FRESH FROZEN: CPT

## 2021-01-01 PROCEDURE — 88305 TISSUE EXAM BY PATHOLOGIST: CPT | Performed by: SURGERY

## 2021-01-01 PROCEDURE — 36415 COLL VENOUS BLD VENIPUNCTURE: CPT | Performed by: INTERNAL MEDICINE

## 2021-01-01 PROCEDURE — 96415 CHEMO IV INFUSION ADDL HR: CPT

## 2021-01-01 PROCEDURE — 85610 PROTHROMBIN TIME: CPT | Performed by: SURGERY

## 2021-01-01 PROCEDURE — 93005 ELECTROCARDIOGRAM TRACING: CPT | Performed by: INTERNAL MEDICINE

## 2021-01-01 PROCEDURE — 92526 ORAL FUNCTION THERAPY: CPT

## 2021-01-01 PROCEDURE — 87651 STREP A DNA AMP PROBE: CPT | Performed by: INTERNAL MEDICINE

## 2021-01-01 PROCEDURE — 84100 ASSAY OF PHOSPHORUS: CPT

## 2021-01-01 PROCEDURE — 83540 ASSAY OF IRON: CPT | Performed by: INTERNAL MEDICINE

## 2021-01-01 PROCEDURE — 25010000002 POTASSIUM CHLORIDE PER 2 MEQ OF POTASSIUM: Performed by: INTERNAL MEDICINE

## 2021-01-01 PROCEDURE — U0004 COV-19 TEST NON-CDC HGH THRU: HCPCS | Performed by: INTERNAL MEDICINE

## 2021-01-01 PROCEDURE — 93005 ELECTROCARDIOGRAM TRACING: CPT | Performed by: PHYSICIAN ASSISTANT

## 2021-01-01 PROCEDURE — 83735 ASSAY OF MAGNESIUM: CPT

## 2021-01-01 PROCEDURE — 94618 PULMONARY STRESS TESTING: CPT

## 2021-01-01 PROCEDURE — 80202 ASSAY OF VANCOMYCIN: CPT | Performed by: INTERNAL MEDICINE

## 2021-01-01 PROCEDURE — 99217 PR OBSERVATION CARE DISCHARGE MANAGEMENT: CPT | Performed by: INTERNAL MEDICINE

## 2021-01-01 PROCEDURE — 99223 1ST HOSP IP/OBS HIGH 75: CPT | Performed by: INTERNAL MEDICINE

## 2021-01-01 PROCEDURE — 25010000002 ONDANSETRON PER 1 MG: Performed by: SURGERY

## 2021-01-01 PROCEDURE — 99239 HOSP IP/OBS DSCHRG MGMT >30: CPT | Performed by: INTERNAL MEDICINE

## 2021-01-01 PROCEDURE — 85379 FIBRIN DEGRADATION QUANT: CPT | Performed by: PHYSICIAN ASSISTANT

## 2021-01-01 PROCEDURE — 82962 GLUCOSE BLOOD TEST: CPT

## 2021-01-01 PROCEDURE — 25010000002 METHYLPREDNISOLONE PER 125 MG: Performed by: NURSE PRACTITIONER

## 2021-01-01 PROCEDURE — 85730 THROMBOPLASTIN TIME PARTIAL: CPT | Performed by: NURSE PRACTITIONER

## 2021-01-01 PROCEDURE — 97530 THERAPEUTIC ACTIVITIES: CPT

## 2021-01-01 PROCEDURE — 87205 SMEAR GRAM STAIN: CPT | Performed by: INTERNAL MEDICINE

## 2021-01-01 PROCEDURE — C9803 HOPD COVID-19 SPEC COLLECT: HCPCS

## 2021-01-01 PROCEDURE — 99441 PR PHYS/QHP TELEPHONE EVALUATION 5-10 MIN: CPT | Performed by: PHYSICAL MEDICINE & REHABILITATION

## 2021-01-01 PROCEDURE — 80198 ASSAY OF THEOPHYLLINE: CPT | Performed by: PHYSICIAN ASSISTANT

## 2021-01-01 PROCEDURE — 85610 PROTHROMBIN TIME: CPT | Performed by: PHYSICIAN ASSISTANT

## 2021-01-01 PROCEDURE — P9017 PLASMA 1 DONOR FRZ W/IN 8 HR: HCPCS

## 2021-01-01 PROCEDURE — 0 IOPAMIDOL PER 1 ML: Performed by: RADIOLOGY

## 2021-01-01 PROCEDURE — 77263 THER RADIOLOGY TX PLNG CPLX: CPT | Performed by: RADIOLOGY

## 2021-01-01 PROCEDURE — 0 PHYTONADIONE 10 MG/ML SOLUTION: Performed by: NURSE PRACTITIONER

## 2021-01-01 PROCEDURE — 0JPT0WZ REMOVAL OF TOTALLY IMPLANTABLE VASCULAR ACCESS DEVICE FROM TRUNK SUBCUTANEOUS TISSUE AND FASCIA, OPEN APPROACH: ICD-10-PCS | Performed by: SURGERY

## 2021-01-01 PROCEDURE — 84484 ASSAY OF TROPONIN QUANT: CPT | Performed by: PHYSICIAN ASSISTANT

## 2021-01-01 PROCEDURE — 99999 PR OFFICE/OUTPT VISIT,PROCEDURE ONLY: CPT | Performed by: FAMILY MEDICINE

## 2021-01-01 PROCEDURE — 84466 ASSAY OF TRANSFERRIN: CPT | Performed by: NURSE PRACTITIONER

## 2021-01-01 PROCEDURE — 83880 ASSAY OF NATRIURETIC PEPTIDE: CPT | Performed by: PHYSICIAN ASSISTANT

## 2021-01-01 PROCEDURE — 85007 BL SMEAR W/DIFF WBC COUNT: CPT | Performed by: NURSE PRACTITIONER

## 2021-01-01 PROCEDURE — 71275 CT ANGIOGRAPHY CHEST: CPT

## 2021-01-01 PROCEDURE — 87040 BLOOD CULTURE FOR BACTERIA: CPT | Performed by: NURSE PRACTITIONER

## 2021-01-01 PROCEDURE — 88341 IMHCHEM/IMCYTCHM EA ADD ANTB: CPT | Performed by: INTERNAL MEDICINE

## 2021-01-01 RX ORDER — SODIUM CHLORIDE 0.9 % (FLUSH) 0.9 %
20 SYRINGE (ML) INJECTION AS NEEDED
Status: CANCELLED | OUTPATIENT
Start: 2021-01-01

## 2021-01-01 RX ORDER — CETIRIZINE HYDROCHLORIDE 10 MG/1
10 TABLET ORAL NIGHTLY
Status: DISCONTINUED | OUTPATIENT
Start: 2021-01-01 | End: 2021-01-01 | Stop reason: HOSPADM

## 2021-01-01 RX ORDER — HEPARIN SODIUM (PORCINE) LOCK FLUSH IV SOLN 100 UNIT/ML 100 UNIT/ML
500 SOLUTION INTRAVENOUS AS NEEDED
Status: CANCELLED | OUTPATIENT
Start: 2021-01-01

## 2021-01-01 RX ORDER — OXYCODONE HYDROCHLORIDE 20 MG/1
20 TABLET ORAL EVERY 4 HOURS PRN
Qty: 12 TABLET | Refills: 0 | Status: SHIPPED | OUTPATIENT
Start: 2021-01-01 | End: 2021-01-01 | Stop reason: SDUPTHER

## 2021-01-01 RX ORDER — SODIUM CHLORIDE 0.9 % (FLUSH) 0.9 %
10 SYRINGE (ML) INJECTION AS NEEDED
Status: DISCONTINUED | OUTPATIENT
Start: 2021-01-01 | End: 2021-01-01 | Stop reason: HOSPADM

## 2021-01-01 RX ORDER — SODIUM CHLORIDE 0.9 % (FLUSH) 0.9 %
10 SYRINGE (ML) INJECTION AS NEEDED
Status: CANCELLED | OUTPATIENT
Start: 2021-01-01

## 2021-01-01 RX ORDER — ACETYLCYSTEINE 200 MG/ML
2 SOLUTION ORAL; RESPIRATORY (INHALATION)
Status: DISCONTINUED | OUTPATIENT
Start: 2021-01-01 | End: 2021-01-01 | Stop reason: HOSPADM

## 2021-01-01 RX ORDER — PALONOSETRON 0.05 MG/ML
0.25 INJECTION, SOLUTION INTRAVENOUS ONCE
Status: COMPLETED | OUTPATIENT
Start: 2021-01-01 | End: 2021-01-01

## 2021-01-01 RX ORDER — TEMAZEPAM 30 MG/1
30 CAPSULE ORAL NIGHTLY PRN
Qty: 30 CAPSULE | Refills: 2 | Status: SHIPPED | OUTPATIENT
Start: 2021-01-01 | End: 2021-01-01

## 2021-01-01 RX ORDER — MAGNESIUM SULFATE 1 G/100ML
1 INJECTION INTRAVENOUS ONCE
Status: CANCELLED | OUTPATIENT
Start: 2021-01-01

## 2021-01-01 RX ORDER — SODIUM CHLORIDE 9 MG/ML
250 INJECTION, SOLUTION INTRAVENOUS ONCE
Status: CANCELLED | OUTPATIENT
Start: 2021-01-01

## 2021-01-01 RX ORDER — ONDANSETRON 4 MG/1
4 TABLET, FILM COATED ORAL EVERY 6 HOURS PRN
Status: DISCONTINUED | OUTPATIENT
Start: 2021-01-01 | End: 2021-01-01

## 2021-01-01 RX ORDER — ETOPOSIDE 50 MG/1
100 CAPSULE ORAL
Qty: 20 CAPSULE | Refills: 2 | Status: SHIPPED | OUTPATIENT
Start: 2021-01-01 | End: 2021-01-01 | Stop reason: SDUPTHER

## 2021-01-01 RX ORDER — TEMAZEPAM 30 MG/1
30 CAPSULE ORAL NIGHTLY PRN
Qty: 30 CAPSULE | Refills: 0 | Status: SHIPPED | OUTPATIENT
Start: 2021-01-01 | End: 2021-01-01

## 2021-01-01 RX ORDER — HEPARIN SODIUM (PORCINE) LOCK FLUSH IV SOLN 100 UNIT/ML 100 UNIT/ML
500 SOLUTION INTRAVENOUS AS NEEDED
Status: DISCONTINUED | OUTPATIENT
Start: 2021-01-01 | End: 2021-01-01 | Stop reason: HOSPADM

## 2021-01-01 RX ORDER — POTASSIUM CHLORIDE 20 MEQ/1
40 TABLET, EXTENDED RELEASE ORAL DAILY
Status: DISCONTINUED | OUTPATIENT
Start: 2021-01-01 | End: 2021-01-01 | Stop reason: HOSPADM

## 2021-01-01 RX ORDER — SODIUM CHLORIDE 0.9 % (FLUSH) 0.9 %
20 SYRINGE (ML) INJECTION AS NEEDED
Status: DISCONTINUED | OUTPATIENT
Start: 2021-01-01 | End: 2021-01-01 | Stop reason: HOSPADM

## 2021-01-01 RX ORDER — POTASSIUM CHLORIDE 20 MEQ/1
40 TABLET, EXTENDED RELEASE ORAL DAILY
Qty: 60 TABLET | Refills: 3 | Status: SHIPPED | OUTPATIENT
Start: 2021-01-01 | End: 2021-01-01

## 2021-01-01 RX ORDER — ALBUTEROL SULFATE 2.5 MG/3ML
2.5 SOLUTION RESPIRATORY (INHALATION) ONCE AS NEEDED
Status: CANCELLED | OUTPATIENT
Start: 2021-01-01

## 2021-01-01 RX ORDER — SODIUM CHLORIDE 0.9 % (FLUSH) 0.9 %
10 SYRINGE (ML) INJECTION EVERY 12 HOURS SCHEDULED
Status: DISCONTINUED | OUTPATIENT
Start: 2021-01-01 | End: 2021-01-01 | Stop reason: HOSPADM

## 2021-01-01 RX ORDER — TRIAMTERENE AND HYDROCHLOROTHIAZIDE 37.5; 25 MG/1; MG/1
1 CAPSULE ORAL DAILY
Qty: 90 CAPSULE | Refills: 1 | Status: SHIPPED | OUTPATIENT
Start: 2021-01-01

## 2021-01-01 RX ORDER — TEMAZEPAM 15 MG/1
30 CAPSULE ORAL NIGHTLY PRN
Status: DISCONTINUED | OUTPATIENT
Start: 2021-01-01 | End: 2021-01-01 | Stop reason: HOSPADM

## 2021-01-01 RX ORDER — FOLIC ACID 1 MG/1
1000 TABLET ORAL DAILY
Qty: 30 TABLET | Refills: 1 | Status: SHIPPED | OUTPATIENT
Start: 2021-01-01

## 2021-01-01 RX ORDER — WARFARIN SODIUM 3 MG/1
3 TABLET ORAL
Status: COMPLETED | OUTPATIENT
Start: 2021-01-01 | End: 2021-01-01

## 2021-01-01 RX ORDER — CALCIUM GLUCONATE 20 MG/ML
2 INJECTION, SOLUTION INTRAVENOUS ONCE
Status: COMPLETED | OUTPATIENT
Start: 2021-01-01 | End: 2021-01-01

## 2021-01-01 RX ORDER — WARFARIN SODIUM 2.5 MG/1
2.5 TABLET ORAL
Status: COMPLETED | OUTPATIENT
Start: 2021-01-01 | End: 2021-01-01

## 2021-01-01 RX ORDER — DEXAMETHASONE 4 MG/1
4 TABLET ORAL EVERY 12 HOURS SCHEDULED
Status: DISCONTINUED | OUTPATIENT
Start: 2021-01-01 | End: 2021-01-01

## 2021-01-01 RX ORDER — SODIUM CHLORIDE 9 MG/ML
250 INJECTION, SOLUTION INTRAVENOUS ONCE
Status: COMPLETED | OUTPATIENT
Start: 2021-01-01 | End: 2021-01-01

## 2021-01-01 RX ORDER — SODIUM CHLORIDE 9 MG/ML
INJECTION, SOLUTION INTRAVENOUS CONTINUOUS PRN
Status: DISCONTINUED | OUTPATIENT
Start: 2021-01-01 | End: 2021-01-01 | Stop reason: SURG

## 2021-01-01 RX ORDER — TEMAZEPAM 30 MG/1
30 CAPSULE ORAL NIGHTLY PRN
Qty: 5 CAPSULE | Refills: 1 | Status: SHIPPED | OUTPATIENT
Start: 2021-01-01

## 2021-01-01 RX ORDER — OXYCODONE HYDROCHLORIDE 20 MG/1
20 TABLET ORAL EVERY 4 HOURS PRN
Qty: 10 TABLET | Refills: 0 | Status: SHIPPED | OUTPATIENT
Start: 2021-01-01

## 2021-01-01 RX ORDER — OXYCODONE HYDROCHLORIDE 20 MG/1
20 TABLET ORAL EVERY 4 HOURS PRN
Qty: 180 TABLET | Refills: 0 | Status: SHIPPED | OUTPATIENT
Start: 2021-01-01 | End: 2021-01-01 | Stop reason: SDUPTHER

## 2021-01-01 RX ORDER — WARFARIN SODIUM 5 MG/1
5 TABLET ORAL
Status: DISCONTINUED | OUTPATIENT
Start: 2021-01-01 | End: 2021-01-01 | Stop reason: HOSPADM

## 2021-01-01 RX ORDER — SODIUM CHLORIDE 9 MG/ML
250 INJECTION, SOLUTION INTRAVENOUS AS NEEDED
Status: CANCELLED | OUTPATIENT
Start: 2021-01-01

## 2021-01-01 RX ORDER — ZINC SULFATE 50(220)MG
440 CAPSULE ORAL DAILY
Status: DISCONTINUED | OUTPATIENT
Start: 2021-01-01 | End: 2021-01-01 | Stop reason: HOSPADM

## 2021-01-01 RX ORDER — ASPIRIN 325 MG
TABLET ORAL
Qty: 60 TABLET | Refills: 1 | Status: SHIPPED | OUTPATIENT
Start: 2021-01-01

## 2021-01-01 RX ORDER — SOD PHOS DI, MONO/K PHOS MONO 250 MG
1 TABLET ORAL 2 TIMES DAILY
Status: DISCONTINUED | OUTPATIENT
Start: 2021-01-01 | End: 2021-01-01

## 2021-01-01 RX ORDER — WARFARIN SODIUM 2.5 MG/1
2.5 TABLET ORAL
Status: DISCONTINUED | OUTPATIENT
Start: 2021-01-01 | End: 2021-01-01

## 2021-01-01 RX ORDER — GUAIFENESIN 600 MG/1
1200 TABLET, EXTENDED RELEASE ORAL 2 TIMES DAILY
Status: DISCONTINUED | OUTPATIENT
Start: 2021-01-01 | End: 2021-01-01 | Stop reason: HOSPADM

## 2021-01-01 RX ORDER — CALCIUM GLUCONATE 20 MG/ML
2 INJECTION, SOLUTION INTRAVENOUS AS NEEDED
Status: DISCONTINUED | OUTPATIENT
Start: 2021-01-01 | End: 2021-01-01 | Stop reason: HOSPADM

## 2021-01-01 RX ORDER — ACETAMINOPHEN 325 MG/1
650 TABLET ORAL EVERY 4 HOURS PRN
Status: DISCONTINUED | OUTPATIENT
Start: 2021-01-01 | End: 2021-01-01 | Stop reason: HOSPADM

## 2021-01-01 RX ORDER — ONDANSETRON 4 MG/1
4 TABLET, FILM COATED ORAL EVERY 6 HOURS PRN
Status: DISCONTINUED | OUTPATIENT
Start: 2021-01-01 | End: 2021-01-01 | Stop reason: HOSPADM

## 2021-01-01 RX ORDER — ONDANSETRON 2 MG/ML
4 INJECTION INTRAMUSCULAR; INTRAVENOUS ONCE AS NEEDED
Status: CANCELLED | OUTPATIENT
Start: 2021-01-01

## 2021-01-01 RX ORDER — CETIRIZINE HYDROCHLORIDE 10 MG/1
10 TABLET ORAL NIGHTLY
Qty: 30 TABLET | Refills: 0 | Status: SHIPPED | OUTPATIENT
Start: 2021-01-01

## 2021-01-01 RX ORDER — WARFARIN SODIUM 5 MG/1
5 TABLET ORAL
Status: COMPLETED | OUTPATIENT
Start: 2021-01-01 | End: 2021-01-01

## 2021-01-01 RX ORDER — OXYCODONE HYDROCHLORIDE 10 MG/1
10 TABLET ORAL EVERY 4 HOURS PRN
Qty: 180 TABLET | Refills: 0 | Status: SHIPPED | OUTPATIENT
Start: 2021-01-01 | End: 2021-01-01

## 2021-01-01 RX ORDER — MONTELUKAST SODIUM 10 MG/1
10 TABLET ORAL NIGHTLY
Qty: 90 TABLET | Refills: 1 | Status: SHIPPED | OUTPATIENT
Start: 2021-01-01 | End: 2021-01-01

## 2021-01-01 RX ORDER — ACETAMINOPHEN 650 MG/1
650 SUPPOSITORY RECTAL ONCE AS NEEDED
Status: DISCONTINUED | OUTPATIENT
Start: 2021-01-01 | End: 2021-01-01 | Stop reason: HOSPADM

## 2021-01-01 RX ORDER — FLUCONAZOLE 100 MG/1
400 TABLET ORAL EVERY 24 HOURS
Status: DISCONTINUED | OUTPATIENT
Start: 2021-01-01 | End: 2021-01-01 | Stop reason: HOSPADM

## 2021-01-01 RX ORDER — CEFDINIR 300 MG/1
300 CAPSULE ORAL EVERY 12 HOURS SCHEDULED
Qty: 5 CAPSULE | Refills: 0 | Status: SHIPPED | OUTPATIENT
Start: 2021-01-01 | End: 2021-01-01

## 2021-01-01 RX ORDER — GUAIFENESIN 600 MG/1
1200 TABLET, EXTENDED RELEASE ORAL 2 TIMES DAILY
Qty: 60 TABLET | Refills: 1 | Status: SHIPPED | OUTPATIENT
Start: 2021-01-01 | End: 2021-01-01

## 2021-01-01 RX ORDER — IPRATROPIUM BROMIDE AND ALBUTEROL SULFATE 2.5; .5 MG/3ML; MG/3ML
3 SOLUTION RESPIRATORY (INHALATION) EVERY 4 HOURS PRN
Status: DISCONTINUED | OUTPATIENT
Start: 2021-01-01 | End: 2021-01-01 | Stop reason: HOSPADM

## 2021-01-01 RX ORDER — CYANOCOBALAMIN 1000 UG/ML
1000 INJECTION, SOLUTION INTRAMUSCULAR; SUBCUTANEOUS ONCE
Status: CANCELLED | OUTPATIENT
Start: 2021-01-01

## 2021-01-01 RX ORDER — ACETAMINOPHEN 160 MG/5ML
650 SOLUTION ORAL EVERY 4 HOURS PRN
Status: DISCONTINUED | OUTPATIENT
Start: 2021-01-01 | End: 2021-01-01 | Stop reason: HOSPADM

## 2021-01-01 RX ORDER — POTASSIUM CHLORIDE 20 MEQ/1
60 TABLET, EXTENDED RELEASE ORAL DAILY
COMMUNITY

## 2021-01-01 RX ORDER — EPINEPHRINE 0.1 MG/ML
SYRINGE (ML) INJECTION
Status: DISCONTINUED
Start: 2021-01-01 | End: 2021-01-01 | Stop reason: HOSPADM

## 2021-01-01 RX ORDER — GUAIFENESIN AND CODEINE PHOSPHATE 100; 10 MG/5ML; MG/5ML
5 SOLUTION ORAL NIGHTLY PRN
Qty: 120 ML | Refills: 0 | Status: SHIPPED | OUTPATIENT
Start: 2021-01-01 | End: 2021-01-01

## 2021-01-01 RX ORDER — POTASSIUM CHLORIDE 20 MEQ/1
60 TABLET, EXTENDED RELEASE ORAL DAILY
Status: DISCONTINUED | OUTPATIENT
Start: 2021-01-01 | End: 2021-01-01

## 2021-01-01 RX ORDER — LIDOCAINE 50 MG/G
OINTMENT TOPICAL
Status: DISCONTINUED
Start: 2021-01-01 | End: 2021-01-01 | Stop reason: HOSPADM

## 2021-01-01 RX ORDER — POTASSIUM CHLORIDE 1500 MG/1
TABLET, EXTENDED RELEASE ORAL
Qty: 450 TABLET | Refills: 1 | Status: SHIPPED | OUTPATIENT
Start: 2021-01-01 | End: 2021-01-01

## 2021-01-01 RX ORDER — SODIUM CHLORIDE 0.9 % (FLUSH) 0.9 %
3-10 SYRINGE (ML) INJECTION AS NEEDED
Status: DISCONTINUED | OUTPATIENT
Start: 2021-01-01 | End: 2021-01-01 | Stop reason: HOSPADM

## 2021-01-01 RX ORDER — FOLIC ACID 1 MG/1
1000 TABLET ORAL DAILY
Status: DISCONTINUED | OUTPATIENT
Start: 2021-01-01 | End: 2021-01-01 | Stop reason: HOSPADM

## 2021-01-01 RX ORDER — MEPERIDINE HYDROCHLORIDE 25 MG/ML
12.5 INJECTION INTRAMUSCULAR; INTRAVENOUS; SUBCUTANEOUS
Status: DISCONTINUED | OUTPATIENT
Start: 2021-01-01 | End: 2021-01-01 | Stop reason: HOSPADM

## 2021-01-01 RX ORDER — IPRATROPIUM BROMIDE AND ALBUTEROL SULFATE 2.5; .5 MG/3ML; MG/3ML
3 SOLUTION RESPIRATORY (INHALATION) ONCE AS NEEDED
Status: DISCONTINUED | OUTPATIENT
Start: 2021-01-01 | End: 2021-01-01 | Stop reason: HOSPADM

## 2021-01-01 RX ORDER — HYDROMORPHONE HCL 110MG/55ML
0.5 PATIENT CONTROLLED ANALGESIA SYRINGE INTRAVENOUS
Status: CANCELLED | OUTPATIENT
Start: 2021-01-01 | End: 2021-01-01

## 2021-01-01 RX ORDER — FAMOTIDINE 20 MG/1
40 TABLET, FILM COATED ORAL
Status: DISCONTINUED | OUTPATIENT
Start: 2021-01-01 | End: 2021-01-01 | Stop reason: HOSPADM

## 2021-01-01 RX ORDER — PALONOSETRON 0.05 MG/ML
0.25 INJECTION, SOLUTION INTRAVENOUS ONCE
Status: CANCELLED | OUTPATIENT
Start: 2021-01-01

## 2021-01-01 RX ORDER — BUPIVACAINE HYDROCHLORIDE AND EPINEPHRINE 5; 5 MG/ML; UG/ML
INJECTION, SOLUTION EPIDURAL; INTRACAUDAL; PERINEURAL AS NEEDED
Status: DISCONTINUED | OUTPATIENT
Start: 2021-01-01 | End: 2021-01-01 | Stop reason: HOSPADM

## 2021-01-01 RX ORDER — ESMOLOL HYDROCHLORIDE 10 MG/ML
INJECTION INTRAVENOUS AS NEEDED
Status: DISCONTINUED | OUTPATIENT
Start: 2021-01-01 | End: 2021-01-01 | Stop reason: SURG

## 2021-01-01 RX ORDER — LIDOCAINE 50 MG/G
OINTMENT TOPICAL AS NEEDED
Status: DISCONTINUED | OUTPATIENT
Start: 2021-01-01 | End: 2021-01-01 | Stop reason: HOSPADM

## 2021-01-01 RX ORDER — TEMAZEPAM 30 MG/1
30 CAPSULE ORAL NIGHTLY PRN
Qty: 30 CAPSULE | Refills: 1 | Status: SHIPPED | OUTPATIENT
Start: 2021-01-01 | End: 2021-01-01 | Stop reason: SDUPTHER

## 2021-01-01 RX ORDER — IPRATROPIUM BROMIDE AND ALBUTEROL SULFATE 2.5; .5 MG/3ML; MG/3ML
3 SOLUTION RESPIRATORY (INHALATION)
Status: DISCONTINUED | OUTPATIENT
Start: 2021-01-01 | End: 2021-01-01 | Stop reason: HOSPADM

## 2021-01-01 RX ORDER — ONDANSETRON 4 MG/1
8 TABLET, FILM COATED ORAL 3 TIMES DAILY PRN
Status: DISCONTINUED | OUTPATIENT
Start: 2021-01-01 | End: 2021-01-01

## 2021-01-01 RX ORDER — HYDRALAZINE HYDROCHLORIDE 20 MG/ML
5 INJECTION INTRAMUSCULAR; INTRAVENOUS
Status: DISCONTINUED | OUTPATIENT
Start: 2021-01-01 | End: 2021-01-01 | Stop reason: HOSPADM

## 2021-01-01 RX ORDER — OXYCODONE HYDROCHLORIDE 5 MG/1
20 TABLET ORAL EVERY 4 HOURS PRN
Status: DISCONTINUED | OUTPATIENT
Start: 2021-01-01 | End: 2021-01-01 | Stop reason: SDUPTHER

## 2021-01-01 RX ORDER — FENTANYL/ROPIVACAINE/NS/PF 2-625MCG/1
15 PLASTIC BAG, INJECTION (ML) EPIDURAL AS NEEDED
Status: DISCONTINUED | OUTPATIENT
Start: 2021-01-01 | End: 2021-01-01 | Stop reason: HOSPADM

## 2021-01-01 RX ORDER — TEMAZEPAM 30 MG/1
30 CAPSULE ORAL NIGHTLY PRN
Qty: 30 CAPSULE | Refills: 1 | Status: ON HOLD | OUTPATIENT
Start: 2021-01-01 | End: 2021-01-01 | Stop reason: SDUPTHER

## 2021-01-01 RX ORDER — PREGABALIN 100 MG/1
100 CAPSULE ORAL 3 TIMES DAILY
Qty: 15 CAPSULE | Refills: 0 | Status: ON HOLD | OUTPATIENT
Start: 2021-01-01 | End: 2021-01-01 | Stop reason: SDUPTHER

## 2021-01-01 RX ORDER — ALBUTEROL SULFATE 2.5 MG/3ML
2.5 SOLUTION RESPIRATORY (INHALATION) EVERY 4 HOURS PRN
Status: DISCONTINUED | OUTPATIENT
Start: 2021-01-01 | End: 2021-01-01 | Stop reason: HOSPADM

## 2021-01-01 RX ORDER — PREGABALIN 100 MG/1
100 CAPSULE ORAL 3 TIMES DAILY
Qty: 90 CAPSULE | Refills: 5 | Status: ON HOLD | OUTPATIENT
Start: 2021-01-01 | End: 2021-01-01 | Stop reason: SDUPTHER

## 2021-01-01 RX ORDER — ACETAMINOPHEN 325 MG/1
650 TABLET ORAL ONCE AS NEEDED
Status: DISCONTINUED | OUTPATIENT
Start: 2021-01-01 | End: 2021-01-01 | Stop reason: HOSPADM

## 2021-01-01 RX ORDER — OXYCODONE HYDROCHLORIDE 10 MG/1
10 TABLET ORAL EVERY 4 HOURS PRN
Qty: 180 TABLET | Refills: 0 | OUTPATIENT
Start: 2021-01-01 | End: 2021-01-01

## 2021-01-01 RX ORDER — AZITHROMYCIN 250 MG/1
TABLET, FILM COATED ORAL
Qty: 6 TABLET | Refills: 0 | Status: SHIPPED | OUTPATIENT
Start: 2021-01-01 | End: 2021-01-01

## 2021-01-01 RX ORDER — OXYCODONE HYDROCHLORIDE 10 MG/1
10 TABLET ORAL EVERY 6 HOURS PRN
Qty: 120 TABLET | Refills: 0 | Status: SHIPPED | OUTPATIENT
Start: 2021-01-01 | End: 2021-01-01 | Stop reason: SDUPTHER

## 2021-01-01 RX ORDER — POTASSIUM CHLORIDE 20 MEQ/1
20 TABLET, EXTENDED RELEASE ORAL 2 TIMES DAILY WITH MEALS
Status: DISCONTINUED | OUTPATIENT
Start: 2021-01-01 | End: 2021-01-01 | Stop reason: HOSPADM

## 2021-01-01 RX ORDER — ERYTHROMYCIN 5 MG/G
OINTMENT OPHTHALMIC 4 TIMES DAILY
Start: 2021-01-01 | End: 2021-01-01

## 2021-01-01 RX ORDER — POTASSIUM CHLORIDE 20 MEQ/1
40 TABLET, EXTENDED RELEASE ORAL AS NEEDED
Status: DISCONTINUED | OUTPATIENT
Start: 2021-01-01 | End: 2021-01-01 | Stop reason: HOSPADM

## 2021-01-01 RX ORDER — TRIAMTERENE AND HYDROCHLOROTHIAZIDE 37.5; 25 MG/1; MG/1
1 TABLET ORAL DAILY
Status: DISCONTINUED | OUTPATIENT
Start: 2021-01-01 | End: 2021-01-01 | Stop reason: HOSPADM

## 2021-01-01 RX ORDER — DIPHENHYDRAMINE HYDROCHLORIDE 50 MG/ML
12.5 INJECTION INTRAMUSCULAR; INTRAVENOUS
Status: CANCELLED | OUTPATIENT
Start: 2021-01-01

## 2021-01-01 RX ORDER — ACETAMINOPHEN 650 MG/1
650 SUPPOSITORY RECTAL EVERY 4 HOURS PRN
Status: DISCONTINUED | OUTPATIENT
Start: 2021-01-01 | End: 2021-01-01 | Stop reason: HOSPADM

## 2021-01-01 RX ORDER — BENZONATATE 100 MG/1
100 CAPSULE ORAL 3 TIMES DAILY PRN
Qty: 30 CAPSULE | Refills: 0 | Status: SHIPPED | OUTPATIENT
Start: 2021-01-01 | End: 2021-01-01

## 2021-01-01 RX ORDER — HEPARIN SODIUM (PORCINE) LOCK FLUSH IV SOLN 100 UNIT/ML 100 UNIT/ML
500 SOLUTION INTRAVENOUS ONCE
Status: COMPLETED | OUTPATIENT
Start: 2021-01-01 | End: 2021-01-01

## 2021-01-01 RX ORDER — ALBUTEROL SULFATE 90 UG/1
2 AEROSOL, METERED RESPIRATORY (INHALATION)
Status: DISCONTINUED | OUTPATIENT
Start: 2021-01-01 | End: 2021-01-01 | Stop reason: HOSPADM

## 2021-01-01 RX ORDER — MAGNESIUM SULFATE 1 G/100ML
1 INJECTION INTRAVENOUS AS NEEDED
Status: DISCONTINUED | OUTPATIENT
Start: 2021-01-01 | End: 2021-01-01 | Stop reason: HOSPADM

## 2021-01-01 RX ORDER — CALCIUM GLUCONATE 20 MG/ML
1 INJECTION, SOLUTION INTRAVENOUS AS NEEDED
Status: DISCONTINUED | OUTPATIENT
Start: 2021-01-01 | End: 2021-01-01 | Stop reason: HOSPADM

## 2021-01-01 RX ORDER — WARFARIN SODIUM 5 MG/1
5 TABLET ORAL
Qty: 30 TABLET | Refills: 0 | Status: SHIPPED | OUTPATIENT
Start: 2021-01-01 | End: 2021-01-01 | Stop reason: SDUPTHER

## 2021-01-01 RX ORDER — SODIUM CHLORIDE 9 MG/ML
250 INJECTION, SOLUTION INTRAVENOUS AS NEEDED
Status: DISCONTINUED | OUTPATIENT
Start: 2021-01-01 | End: 2021-01-01 | Stop reason: HOSPADM

## 2021-01-01 RX ORDER — WARFARIN SODIUM 6 MG/1
6 TABLET ORAL
Status: DISCONTINUED | OUTPATIENT
Start: 2021-01-01 | End: 2021-01-01

## 2021-01-01 RX ORDER — METHYLPHENIDATE HYDROCHLORIDE 5 MG/1
TABLET ORAL
Qty: 30 TABLET | Refills: 0 | Status: SHIPPED | OUTPATIENT
Start: 2021-01-01

## 2021-01-01 RX ORDER — LIDOCAINE HYDROCHLORIDE 10 MG/ML
INJECTION, SOLUTION EPIDURAL; INFILTRATION; INTRACAUDAL; PERINEURAL AS NEEDED
Status: DISCONTINUED | OUTPATIENT
Start: 2021-01-01 | End: 2021-01-01 | Stop reason: SURG

## 2021-01-01 RX ORDER — WARFARIN SODIUM 2 MG/1
TABLET ORAL
Qty: 30 TABLET | Refills: 0 | Status: ON HOLD | OUTPATIENT
Start: 2021-01-01 | End: 2021-01-01

## 2021-01-01 RX ORDER — PREGABALIN 100 MG/1
100 CAPSULE ORAL 3 TIMES DAILY
Status: DISCONTINUED | OUTPATIENT
Start: 2021-01-01 | End: 2021-01-01 | Stop reason: HOSPADM

## 2021-01-01 RX ORDER — PROPOFOL 10 MG/ML
INJECTION, EMULSION INTRAVENOUS AS NEEDED
Status: DISCONTINUED | OUTPATIENT
Start: 2021-01-01 | End: 2021-01-01 | Stop reason: SURG

## 2021-01-01 RX ORDER — ONDANSETRON 2 MG/ML
4 INJECTION INTRAMUSCULAR; INTRAVENOUS EVERY 6 HOURS PRN
Status: DISCONTINUED | OUTPATIENT
Start: 2021-01-01 | End: 2021-01-01 | Stop reason: HOSPADM

## 2021-01-01 RX ORDER — OXYCODONE HYDROCHLORIDE 10 MG/1
10 TABLET ORAL EVERY 4 HOURS PRN
Qty: 180 TABLET | Refills: 0 | Status: SHIPPED | OUTPATIENT
Start: 2021-01-01 | End: 2021-01-01 | Stop reason: SDUPTHER

## 2021-01-01 RX ORDER — POTASSIUM CHLORIDE 20 MEQ/1
40 TABLET, EXTENDED RELEASE ORAL NIGHTLY
Qty: 60 TABLET | Refills: 3 | Status: SHIPPED | OUTPATIENT
Start: 2021-01-01

## 2021-01-01 RX ORDER — LIDOCAINE HYDROCHLORIDE 20 MG/ML
INJECTION, SOLUTION INFILTRATION; PERINEURAL AS NEEDED
Status: DISCONTINUED | OUTPATIENT
Start: 2021-01-01 | End: 2021-01-01 | Stop reason: HOSPADM

## 2021-01-01 RX ORDER — ATORVASTATIN CALCIUM 20 MG/1
20 TABLET, FILM COATED ORAL DAILY
Status: DISCONTINUED | OUTPATIENT
Start: 2021-01-01 | End: 2021-01-01 | Stop reason: HOSPADM

## 2021-01-01 RX ORDER — ALBUTEROL SULFATE 90 UG/1
2 AEROSOL, METERED RESPIRATORY (INHALATION) EVERY 4 HOURS PRN
Status: DISCONTINUED | OUTPATIENT
Start: 2021-01-01 | End: 2021-01-01

## 2021-01-01 RX ORDER — MONTELUKAST SODIUM 10 MG/1
10 TABLET ORAL NIGHTLY
Status: DISCONTINUED | OUTPATIENT
Start: 2021-01-01 | End: 2021-01-01 | Stop reason: HOSPADM

## 2021-01-01 RX ORDER — DEXAMETHASONE 6 MG/1
6 TABLET ORAL
Status: DISCONTINUED | OUTPATIENT
Start: 2021-01-01 | End: 2021-01-01 | Stop reason: HOSPADM

## 2021-01-01 RX ORDER — WARFARIN SODIUM 1 MG/1
1 TABLET ORAL
Qty: 30 TABLET | Refills: 3 | Status: SHIPPED | OUTPATIENT
Start: 2021-01-01 | End: 2021-01-01 | Stop reason: HOSPADM

## 2021-01-01 RX ORDER — PHYTONADIONE 10 MG/ML
10 INJECTION, EMULSION INTRAMUSCULAR; INTRAVENOUS; SUBCUTANEOUS ONCE
Status: COMPLETED | OUTPATIENT
Start: 2021-01-01 | End: 2021-01-01

## 2021-01-01 RX ORDER — PROPOFOL 10 MG/ML
VIAL (ML) INTRAVENOUS AS NEEDED
Status: DISCONTINUED | OUTPATIENT
Start: 2021-01-01 | End: 2021-01-01 | Stop reason: SURG

## 2021-01-01 RX ORDER — OXYCODONE HYDROCHLORIDE 15 MG/1
15 TABLET ORAL EVERY 4 HOURS PRN
Qty: 180 TABLET | Refills: 0 | Status: SHIPPED | OUTPATIENT
Start: 2021-01-01 | End: 2021-01-01

## 2021-01-01 RX ORDER — FLUCONAZOLE 200 MG/1
400 TABLET ORAL EVERY 24 HOURS
Qty: 20 TABLET | Refills: 0 | Status: SHIPPED | OUTPATIENT
Start: 2021-01-01 | End: 2021-01-01

## 2021-01-01 RX ORDER — ALBUTEROL SULFATE 90 UG/1
2 AEROSOL, METERED RESPIRATORY (INHALATION) EVERY 6 HOURS PRN
Status: DISCONTINUED | OUTPATIENT
Start: 2021-01-01 | End: 2021-01-01 | Stop reason: HOSPADM

## 2021-01-01 RX ORDER — BISACODYL 10 MG
10 SUPPOSITORY, RECTAL RECTAL DAILY PRN
Status: DISCONTINUED | OUTPATIENT
Start: 2021-01-01 | End: 2021-01-01 | Stop reason: HOSPADM

## 2021-01-01 RX ORDER — FAMOTIDINE 40 MG/1
40 TABLET, FILM COATED ORAL
Qty: 90 TABLET | Refills: 1 | Status: SHIPPED | OUTPATIENT
Start: 2021-01-01 | End: 2021-01-01 | Stop reason: SDUPTHER

## 2021-01-01 RX ORDER — SODIUM PHOSPHATE, DIBASIC, ANHYDROUS, POTASSIUM PHOSPHATE, MONOBASIC, AND SODIUM PHOSPHATE, MONOBASIC, MONOHYDRATE 852; 155; 130 MG/1; MG/1; MG/1
1 TABLET, COATED ORAL 2 TIMES DAILY
Qty: 4 EACH | Refills: 0 | Status: CANCELLED | OUTPATIENT
Start: 2021-01-01 | End: 2021-01-01

## 2021-01-01 RX ORDER — CEFDINIR 300 MG/1
300 CAPSULE ORAL EVERY 12 HOURS SCHEDULED
Status: DISCONTINUED | OUTPATIENT
Start: 2021-01-01 | End: 2021-01-01 | Stop reason: HOSPADM

## 2021-01-01 RX ORDER — FOLIC ACID 1 MG/1
1000 TABLET ORAL DAILY
Qty: 30 TABLET | Refills: 1 | Status: SHIPPED | OUTPATIENT
Start: 2021-01-01 | End: 2021-01-01 | Stop reason: SDUPTHER

## 2021-01-01 RX ORDER — SODIUM CHLORIDE, SODIUM LACTATE, POTASSIUM CHLORIDE, AND CALCIUM CHLORIDE .6; .31; .03; .02 G/100ML; G/100ML; G/100ML; G/100ML
20 INJECTION, SOLUTION INTRAVENOUS CONTINUOUS
Status: DISCONTINUED | OUTPATIENT
Start: 2021-01-01 | End: 2021-01-01

## 2021-01-01 RX ORDER — METHYLPREDNISOLONE SODIUM SUCCINATE 40 MG/ML
40 INJECTION, POWDER, LYOPHILIZED, FOR SOLUTION INTRAMUSCULAR; INTRAVENOUS EVERY 8 HOURS
Status: DISCONTINUED | OUTPATIENT
Start: 2021-01-01 | End: 2021-01-01 | Stop reason: HOSPADM

## 2021-01-01 RX ORDER — MONTELUKAST SODIUM 10 MG/1
10 TABLET ORAL NIGHTLY
Qty: 30 TABLET | Refills: 2 | Status: SHIPPED | OUTPATIENT
Start: 2021-01-01

## 2021-01-01 RX ORDER — AMOXICILLIN 250 MG
2 CAPSULE ORAL 2 TIMES DAILY
Status: DISCONTINUED | OUTPATIENT
Start: 2021-01-01 | End: 2021-01-01 | Stop reason: HOSPADM

## 2021-01-01 RX ORDER — SODIUM CHLORIDE 9 MG/ML
100 INJECTION, SOLUTION INTRAVENOUS CONTINUOUS
Status: DISCONTINUED | OUTPATIENT
Start: 2021-01-01 | End: 2021-01-01 | Stop reason: HOSPADM

## 2021-01-01 RX ORDER — ONDANSETRON HYDROCHLORIDE 8 MG/1
TABLET, FILM COATED ORAL
COMMUNITY
Start: 2021-01-01

## 2021-01-01 RX ORDER — DROPERIDOL 2.5 MG/ML
0.62 INJECTION, SOLUTION INTRAMUSCULAR; INTRAVENOUS ONCE AS NEEDED
Status: DISCONTINUED | OUTPATIENT
Start: 2021-01-01 | End: 2021-01-01 | Stop reason: HOSPADM

## 2021-01-01 RX ORDER — MONTELUKAST SODIUM 10 MG/1
10 TABLET ORAL NIGHTLY
Qty: 30 TABLET | Refills: 5 | Status: SHIPPED | OUTPATIENT
Start: 2021-01-01 | End: 2021-01-01 | Stop reason: SDUPTHER

## 2021-01-01 RX ORDER — LIDOCAINE HYDROCHLORIDE 20 MG/ML
INJECTION, SOLUTION INTRAVENOUS
Status: DISPENSED
Start: 2021-01-01 | End: 2021-01-01

## 2021-01-01 RX ORDER — OXYCODONE HYDROCHLORIDE 5 MG/1
20 TABLET ORAL EVERY 4 HOURS PRN
Status: DISCONTINUED | OUTPATIENT
Start: 2021-01-01 | End: 2021-01-01 | Stop reason: HOSPADM

## 2021-01-01 RX ORDER — PROMETHAZINE HYDROCHLORIDE 25 MG/1
25 SUPPOSITORY RECTAL ONCE AS NEEDED
Status: DISCONTINUED | OUTPATIENT
Start: 2021-01-01 | End: 2021-01-01 | Stop reason: HOSPADM

## 2021-01-01 RX ORDER — CYANOCOBALAMIN 1000 UG/ML
1000 INJECTION, SOLUTION INTRAMUSCULAR; SUBCUTANEOUS ONCE
Status: COMPLETED | OUTPATIENT
Start: 2021-01-01 | End: 2021-01-01

## 2021-01-01 RX ORDER — CALCIUM CARBONATE 200(500)MG
2 TABLET,CHEWABLE ORAL 3 TIMES DAILY PRN
Status: DISCONTINUED | OUTPATIENT
Start: 2021-01-01 | End: 2021-01-01 | Stop reason: HOSPADM

## 2021-01-01 RX ORDER — LIDOCAINE HYDROCHLORIDE 10 MG/ML
INJECTION, SOLUTION EPIDURAL; INFILTRATION; INTRACAUDAL; PERINEURAL
Status: DISCONTINUED
Start: 2021-01-01 | End: 2021-01-01 | Stop reason: HOSPADM

## 2021-01-01 RX ORDER — DEXAMETHASONE 4 MG/1
TABLET ORAL
Qty: 6 TABLET | Refills: 5 | Status: SHIPPED | OUTPATIENT
Start: 2021-01-01 | End: 2021-01-01 | Stop reason: HOSPADM

## 2021-01-01 RX ORDER — SODIUM CHLORIDE 0.9 % (FLUSH) 0.9 %
3 SYRINGE (ML) INJECTION EVERY 12 HOURS SCHEDULED
Status: DISCONTINUED | OUTPATIENT
Start: 2021-01-01 | End: 2021-01-01 | Stop reason: HOSPADM

## 2021-01-01 RX ORDER — FOLIC ACID 1 MG/1
TABLET ORAL
Qty: 30 TABLET | Refills: 1 | Status: SHIPPED | OUTPATIENT
Start: 2021-01-01 | End: 2021-01-01 | Stop reason: SDUPTHER

## 2021-01-01 RX ORDER — AZITHROMYCIN 250 MG/1
500 TABLET, FILM COATED ORAL ONCE
Status: COMPLETED | OUTPATIENT
Start: 2021-01-01 | End: 2021-01-01

## 2021-01-01 RX ORDER — FAMOTIDINE 40 MG/1
40 TABLET, FILM COATED ORAL
Qty: 90 TABLET | Refills: 1 | Status: SHIPPED | OUTPATIENT
Start: 2021-01-01

## 2021-01-01 RX ORDER — CALCIUM CARBONATE 200(500)MG
1 TABLET,CHEWABLE ORAL 2 TIMES DAILY
Status: DISCONTINUED | OUTPATIENT
Start: 2021-01-01 | End: 2021-01-01 | Stop reason: SDUPTHER

## 2021-01-01 RX ORDER — NALOXONE HCL 0.4 MG/ML
0.4 VIAL (ML) INJECTION AS NEEDED
Status: DISCONTINUED | OUTPATIENT
Start: 2021-01-01 | End: 2021-01-01 | Stop reason: HOSPADM

## 2021-01-01 RX ORDER — LIDOCAINE 50 MG/G
OINTMENT TOPICAL
Status: DISPENSED
Start: 2021-01-01 | End: 2021-01-01

## 2021-01-01 RX ORDER — ONDANSETRON HYDROCHLORIDE 8 MG/1
8 TABLET, FILM COATED ORAL 3 TIMES DAILY PRN
Qty: 30 TABLET | Refills: 5 | Status: SHIPPED | OUTPATIENT
Start: 2021-01-01 | End: 2021-01-01

## 2021-01-01 RX ORDER — PREGABALIN 100 MG/1
100 CAPSULE ORAL 3 TIMES DAILY
Qty: 15 CAPSULE | Refills: 0 | Status: SHIPPED | OUTPATIENT
Start: 2021-01-01 | End: 2021-01-01

## 2021-01-01 RX ORDER — BUDESONIDE 0.5 MG/2ML
0.5 INHALANT ORAL
Start: 2021-01-01

## 2021-01-01 RX ORDER — SODIUM CHLORIDE 9 MG/ML
250 INJECTION, SOLUTION INTRAVENOUS ONCE
Status: DISCONTINUED | OUTPATIENT
Start: 2021-01-01 | End: 2021-01-01 | Stop reason: HOSPADM

## 2021-01-01 RX ORDER — TRIAMTERENE AND HYDROCHLOROTHIAZIDE 37.5; 25 MG/1; MG/1
CAPSULE ORAL
Qty: 90 CAPSULE | Refills: 1 | Status: SHIPPED | OUTPATIENT
Start: 2021-01-01 | End: 2021-01-01 | Stop reason: SDUPTHER

## 2021-01-01 RX ORDER — POLYETHYLENE GLYCOL 3350 17 G/17G
17 POWDER, FOR SOLUTION ORAL DAILY PRN
Status: DISCONTINUED | OUTPATIENT
Start: 2021-01-01 | End: 2021-01-01 | Stop reason: HOSPADM

## 2021-01-01 RX ORDER — WARFARIN SODIUM 4 MG/1
TABLET ORAL
Qty: 30 TABLET | Refills: 3 | Status: SHIPPED | OUTPATIENT
Start: 2021-01-01

## 2021-01-01 RX ORDER — NALBUPHINE HCL 10 MG/ML
10 AMPUL (ML) INJECTION EVERY 4 HOURS PRN
Status: CANCELLED | OUTPATIENT
Start: 2021-01-01

## 2021-01-01 RX ORDER — MAGNESIUM OXIDE 400 MG/1
1 TABLET ORAL 2 TIMES DAILY
Qty: 60 TABLET | Refills: 3 | Status: SHIPPED | OUTPATIENT
Start: 2021-01-01 | End: 2021-01-01 | Stop reason: SDUPTHER

## 2021-01-01 RX ORDER — HYDROMORPHONE HCL 110MG/55ML
0.5 PATIENT CONTROLLED ANALGESIA SYRINGE INTRAVENOUS ONCE
Status: COMPLETED | OUTPATIENT
Start: 2021-01-01 | End: 2021-01-01

## 2021-01-01 RX ORDER — ALBUTEROL SULFATE 90 UG/1
2 AEROSOL, METERED RESPIRATORY (INHALATION) ONCE
Status: COMPLETED | OUTPATIENT
Start: 2021-01-01 | End: 2021-01-01

## 2021-01-01 RX ORDER — METHYLPREDNISOLONE SODIUM SUCCINATE 125 MG/2ML
125 INJECTION, POWDER, LYOPHILIZED, FOR SOLUTION INTRAMUSCULAR; INTRAVENOUS ONCE
Status: COMPLETED | OUTPATIENT
Start: 2021-01-01 | End: 2021-01-01

## 2021-01-01 RX ORDER — NALBUPHINE HCL 10 MG/ML
2 AMPUL (ML) INJECTION EVERY 4 HOURS PRN
Status: CANCELLED | OUTPATIENT
Start: 2021-01-01

## 2021-01-01 RX ORDER — POTASSIUM CHLORIDE 20 MEQ/1
40 TABLET, EXTENDED RELEASE ORAL 3 TIMES DAILY
Qty: 120 TABLET | Refills: 3 | Status: SHIPPED | OUTPATIENT
Start: 2021-01-01 | End: 2021-01-01

## 2021-01-01 RX ORDER — MONTELUKAST SODIUM 10 MG/1
10 TABLET ORAL NIGHTLY
Qty: 30 TABLET | Refills: 0 | Status: SHIPPED | OUTPATIENT
Start: 2021-01-01 | End: 2021-01-01 | Stop reason: SDUPTHER

## 2021-01-01 RX ORDER — NALOXONE HCL 0.4 MG/ML
0.4 VIAL (ML) INJECTION AS NEEDED
Status: CANCELLED | OUTPATIENT
Start: 2021-01-01

## 2021-01-01 RX ORDER — BUDESONIDE 0.5 MG/2ML
0.5 INHALANT ORAL
Status: DISCONTINUED | OUTPATIENT
Start: 2021-01-01 | End: 2021-01-01 | Stop reason: HOSPADM

## 2021-01-01 RX ORDER — POTASSIUM CHLORIDE 20 MEQ/1
40 TABLET, EXTENDED RELEASE ORAL NIGHTLY
COMMUNITY
End: 2021-01-01 | Stop reason: SDUPTHER

## 2021-01-01 RX ORDER — LIDOCAINE HYDROCHLORIDE 20 MG/ML
INJECTION, SOLUTION EPIDURAL; INFILTRATION; INTRACAUDAL; PERINEURAL
Status: DISCONTINUED
Start: 2021-01-01 | End: 2021-01-01 | Stop reason: HOSPADM

## 2021-01-01 RX ORDER — OXYCODONE HYDROCHLORIDE 20 MG/1
TABLET ORAL
Qty: 270 TABLET | Refills: 0 | Status: SHIPPED | OUTPATIENT
Start: 2021-01-01 | End: 2021-01-01 | Stop reason: HOSPADM

## 2021-01-01 RX ORDER — ONDANSETRON 2 MG/ML
8 INJECTION INTRAMUSCULAR; INTRAVENOUS EVERY 6 HOURS PRN
Status: DISCONTINUED | OUTPATIENT
Start: 2021-01-01 | End: 2021-01-01 | Stop reason: HOSPADM

## 2021-01-01 RX ORDER — ONDANSETRON 8 MG/1
8 TABLET, ORALLY DISINTEGRATING ORAL EVERY 8 HOURS PRN
Qty: 30 TABLET | Refills: 3 | Status: SHIPPED | OUTPATIENT
Start: 2021-01-01 | End: 2021-01-01 | Stop reason: SDUPTHER

## 2021-01-01 RX ORDER — CHOLECALCIFEROL (VITAMIN D3) 125 MCG
5 CAPSULE ORAL NIGHTLY PRN
Status: DISCONTINUED | OUTPATIENT
Start: 2021-01-01 | End: 2021-01-01 | Stop reason: HOSPADM

## 2021-01-01 RX ORDER — NITROGLYCERIN 0.4 MG/1
0.4 TABLET SUBLINGUAL
Status: DISCONTINUED | OUTPATIENT
Start: 2021-01-01 | End: 2021-01-01 | Stop reason: HOSPADM

## 2021-01-01 RX ORDER — IPRATROPIUM/ALBUTEROL SULFATE 20-100 MCG
MIST INHALER (GRAM) INHALATION
COMMUNITY
Start: 2021-01-01

## 2021-01-01 RX ORDER — DEXAMETHASONE SODIUM PHOSPHATE 10 MG/ML
6 INJECTION, SOLUTION INTRAMUSCULAR; INTRAVENOUS ONCE
Status: COMPLETED | OUTPATIENT
Start: 2021-01-01 | End: 2021-01-01

## 2021-01-01 RX ORDER — POTASSIUM CHLORIDE 20 MEQ/1
40 TABLET, EXTENDED RELEASE ORAL NIGHTLY
Status: DISCONTINUED | OUTPATIENT
Start: 2021-01-01 | End: 2021-01-01

## 2021-01-01 RX ORDER — OXYCODONE HYDROCHLORIDE 20 MG/1
20 TABLET ORAL EVERY 4 HOURS PRN
Qty: 180 TABLET | Refills: 0 | Status: ON HOLD | OUTPATIENT
Start: 2021-01-01 | End: 2021-01-01 | Stop reason: SDUPTHER

## 2021-01-01 RX ORDER — MIDAZOLAM HYDROCHLORIDE 1 MG/ML
1 INJECTION INTRAMUSCULAR; INTRAVENOUS
Status: CANCELLED | OUTPATIENT
Start: 2021-01-01

## 2021-01-01 RX ORDER — MEPERIDINE HYDROCHLORIDE 25 MG/ML
12.5 INJECTION INTRAMUSCULAR; INTRAVENOUS; SUBCUTANEOUS
Status: CANCELLED | OUTPATIENT
Start: 2021-01-01 | End: 2021-01-01

## 2021-01-01 RX ORDER — CEFDINIR 300 MG/1
300 CAPSULE ORAL 2 TIMES DAILY
Qty: 5 CAPSULE | Refills: 0 | Status: SHIPPED | OUTPATIENT
Start: 2021-01-01 | End: 2021-01-01

## 2021-01-01 RX ORDER — ERYTHROMYCIN 5 MG/G
OINTMENT OPHTHALMIC 4 TIMES DAILY
Status: DISCONTINUED | OUTPATIENT
Start: 2021-01-01 | End: 2021-01-01 | Stop reason: HOSPADM

## 2021-01-01 RX ORDER — PROMETHAZINE HYDROCHLORIDE 25 MG/1
25 TABLET ORAL ONCE AS NEEDED
Status: DISCONTINUED | OUTPATIENT
Start: 2021-01-01 | End: 2021-01-01 | Stop reason: HOSPADM

## 2021-01-01 RX ORDER — ALUMINA, MAGNESIA, AND SIMETHICONE 2400; 2400; 240 MG/30ML; MG/30ML; MG/30ML
15 SUSPENSION ORAL EVERY 6 HOURS PRN
Status: DISCONTINUED | OUTPATIENT
Start: 2021-01-01 | End: 2021-01-01 | Stop reason: HOSPADM

## 2021-01-01 RX ORDER — ONDANSETRON 2 MG/ML
4 INJECTION INTRAMUSCULAR; INTRAVENOUS EVERY 6 HOURS PRN
Status: DISCONTINUED | OUTPATIENT
Start: 2021-01-01 | End: 2021-01-01

## 2021-01-01 RX ORDER — WARFARIN SODIUM 2 MG/1
2 TABLET ORAL
Status: COMPLETED | OUTPATIENT
Start: 2021-01-01 | End: 2021-01-01

## 2021-01-01 RX ORDER — DEXAMETHASONE 6 MG/1
6 TABLET ORAL
Qty: 5 TABLET | Refills: 0 | Status: SHIPPED | OUTPATIENT
Start: 2021-01-01 | End: 2021-01-01

## 2021-01-01 RX ORDER — FOLIC ACID 1 MG/1
1000 TABLET ORAL DAILY
COMMUNITY
Start: 2021-01-01 | End: 2021-01-01

## 2021-01-01 RX ORDER — TEMAZEPAM 30 MG/1
30 CAPSULE ORAL NIGHTLY PRN
Qty: 30 CAPSULE | Refills: 1 | Status: SHIPPED | OUTPATIENT
Start: 2021-01-01 | End: 2021-01-01

## 2021-01-01 RX ORDER — OXYCODONE HYDROCHLORIDE 5 MG/1
10 TABLET ORAL EVERY 4 HOURS PRN
Status: DISCONTINUED | OUTPATIENT
Start: 2021-01-01 | End: 2021-01-01 | Stop reason: HOSPADM

## 2021-01-01 RX ORDER — WARFARIN SODIUM 5 MG/1
TABLET ORAL
Qty: 30 TABLET | Refills: 3 | Status: SHIPPED | OUTPATIENT
Start: 2021-01-01 | End: 2021-01-01 | Stop reason: SDUPTHER

## 2021-01-01 RX ORDER — SOD PHOS DI, MONO/K PHOS MONO 250 MG
250 TABLET ORAL 3 TIMES DAILY
Status: DISCONTINUED | OUTPATIENT
Start: 2021-01-01 | End: 2021-01-01 | Stop reason: HOSPADM

## 2021-01-01 RX ORDER — POTASSIUM CHLORIDE 20 MEQ/1
60 TABLET, EXTENDED RELEASE ORAL DAILY
Qty: 90 TABLET | Refills: 3 | Status: CANCELLED | OUTPATIENT
Start: 2021-01-01

## 2021-01-01 RX ORDER — LORAZEPAM 2 MG/ML
1 INJECTION INTRAMUSCULAR
Status: CANCELLED | OUTPATIENT
Start: 2021-01-01 | End: 2021-01-01

## 2021-01-01 RX ORDER — DEXAMETHASONE SODIUM PHOSPHATE 4 MG/ML
6 INJECTION, SOLUTION INTRA-ARTICULAR; INTRALESIONAL; INTRAMUSCULAR; INTRAVENOUS; SOFT TISSUE DAILY
Status: DISCONTINUED | OUTPATIENT
Start: 2021-01-01 | End: 2021-01-01

## 2021-01-01 RX ORDER — FLUMAZENIL 0.1 MG/ML
0.2 INJECTION INTRAVENOUS AS NEEDED
Status: CANCELLED | OUTPATIENT
Start: 2021-01-01

## 2021-01-01 RX ORDER — MAGNESIUM SULFATE HEPTAHYDRATE 40 MG/ML
2 INJECTION, SOLUTION INTRAVENOUS AS NEEDED
Status: DISCONTINUED | OUTPATIENT
Start: 2021-01-01 | End: 2021-01-01 | Stop reason: HOSPADM

## 2021-01-01 RX ORDER — POTASSIUM CHLORIDE 20 MEQ/1
60 TABLET, EXTENDED RELEASE ORAL EVERY MORNING
Qty: 90 TABLET | Refills: 3 | Status: CANCELLED | OUTPATIENT
Start: 2021-01-01

## 2021-01-01 RX ORDER — OXYCODONE HYDROCHLORIDE 30 MG/1
30 TABLET ORAL EVERY 4 HOURS PRN
Qty: 180 TABLET | Refills: 0 | Status: SHIPPED | OUTPATIENT
Start: 2021-01-01 | End: 2022-01-01 | Stop reason: SDUPTHER

## 2021-01-01 RX ORDER — ASCORBIC ACID 500 MG
500 TABLET ORAL DAILY
Status: DISCONTINUED | OUTPATIENT
Start: 2021-01-01 | End: 2021-01-01 | Stop reason: HOSPADM

## 2021-01-01 RX ORDER — GUAIFENESIN AND CODEINE PHOSPHATE 100; 10 MG/5ML; MG/5ML
5 SOLUTION ORAL NIGHTLY PRN
Qty: 118 ML | Refills: 0 | Status: SHIPPED | OUTPATIENT
Start: 2021-01-01 | End: 2021-01-01

## 2021-01-01 RX ORDER — POTASSIUM CHLORIDE 1.5 G/1.77G
40 POWDER, FOR SOLUTION ORAL AS NEEDED
Status: DISCONTINUED | OUTPATIENT
Start: 2021-01-01 | End: 2021-01-01 | Stop reason: HOSPADM

## 2021-01-01 RX ORDER — LETROZOLE 2.5 MG/1
2.5 TABLET, FILM COATED ORAL DAILY
Status: DISCONTINUED | OUTPATIENT
Start: 2021-01-01 | End: 2021-01-01 | Stop reason: HOSPADM

## 2021-01-01 RX ORDER — POTASSIUM CHLORIDE 7.45 MG/ML
10 INJECTION INTRAVENOUS
Status: DISCONTINUED | OUTPATIENT
Start: 2021-01-01 | End: 2021-01-01 | Stop reason: HOSPADM

## 2021-01-01 RX ORDER — DOXYCYCLINE HYCLATE 100 MG
100 TABLET ORAL 2 TIMES DAILY
Qty: 14 TABLET | Refills: 0 | Status: SHIPPED | OUTPATIENT
Start: 2021-01-01 | End: 2021-01-01

## 2021-01-01 RX ORDER — MAGNESIUM SULFATE HEPTAHYDRATE 40 MG/ML
4 INJECTION, SOLUTION INTRAVENOUS AS NEEDED
Status: DISCONTINUED | OUTPATIENT
Start: 2021-01-01 | End: 2021-01-01 | Stop reason: HOSPADM

## 2021-01-01 RX ORDER — PANTOPRAZOLE SODIUM 40 MG/1
40 TABLET, DELAYED RELEASE ORAL
Refills: 1 | Status: DISCONTINUED | OUTPATIENT
Start: 2021-01-01 | End: 2021-01-01 | Stop reason: HOSPADM

## 2021-01-01 RX ORDER — METHYLPREDNISOLONE 4 MG/1
TABLET ORAL
Qty: 21 EACH | OUTPATIENT
Start: 2021-01-01

## 2021-01-01 RX ORDER — AZITHROMYCIN 250 MG/1
500 TABLET, FILM COATED ORAL EVERY 24 HOURS
Status: DISCONTINUED | OUTPATIENT
Start: 2021-01-01 | End: 2021-01-01

## 2021-01-01 RX ORDER — WARFARIN SODIUM 4 MG/1
4 TABLET ORAL
Status: DISCONTINUED | OUTPATIENT
Start: 2021-01-01 | End: 2021-01-01 | Stop reason: HOSPADM

## 2021-01-01 RX ORDER — DIPHENHYDRAMINE HCL 25 MG
25 CAPSULE ORAL ONCE
Status: CANCELLED | OUTPATIENT
Start: 2021-01-01 | End: 2021-01-01

## 2021-01-01 RX ORDER — HYDROMORPHONE HCL 110MG/55ML
0.5 PATIENT CONTROLLED ANALGESIA SYRINGE INTRAVENOUS
Status: CANCELLED | OUTPATIENT
Start: 2021-01-01

## 2021-01-01 RX ORDER — LETROZOLE 2.5 MG/1
2.5 TABLET, FILM COATED ORAL DAILY
Qty: 30 TABLET | Refills: 6 | Status: SHIPPED | OUTPATIENT
Start: 2021-01-01 | End: 2021-01-01 | Stop reason: SDUPTHER

## 2021-01-01 RX ORDER — SODIUM CHLORIDE 0.9 % (FLUSH) 0.9 %
10 SYRINGE (ML) INJECTION AS NEEDED
Status: DISCONTINUED | OUTPATIENT
Start: 2021-01-01 | End: 2021-01-01

## 2021-01-01 RX ORDER — TEMAZEPAM 15 MG/1
30 CAPSULE ORAL NIGHTLY PRN
Status: DISCONTINUED | OUTPATIENT
Start: 2021-01-01 | End: 2021-01-01

## 2021-01-01 RX ORDER — SOD PHOS DI, MONO/K PHOS MONO 250 MG
TABLET ORAL
COMMUNITY
Start: 2021-01-01 | End: 2021-01-01

## 2021-01-01 RX ORDER — AZELASTINE 1 MG/ML
2 SPRAY, METERED NASAL DAILY
Qty: 30 ML | Refills: 0 | Status: SHIPPED | OUTPATIENT
Start: 2021-01-01 | End: 2021-01-01

## 2021-01-01 RX ORDER — LETROZOLE 2.5 MG/1
2.5 TABLET, FILM COATED ORAL DAILY
Qty: 30 TABLET | Refills: 6 | Status: SHIPPED | OUTPATIENT
Start: 2021-01-01

## 2021-01-01 RX ORDER — SODIUM PHOSPHATE, DIBASIC, ANHYDROUS, POTASSIUM PHOSPHATE, MONOBASIC, AND SODIUM PHOSPHATE, MONOBASIC, MONOHYDRATE 852; 155; 130 MG/1; MG/1; MG/1
1 TABLET, COATED ORAL 2 TIMES DAILY
Qty: 4 EACH | Refills: 0 | Status: SHIPPED | OUTPATIENT
Start: 2021-01-01 | End: 2021-01-01

## 2021-01-01 RX ORDER — FLUMAZENIL 0.1 MG/ML
0.2 INJECTION INTRAVENOUS AS NEEDED
Status: DISCONTINUED | OUTPATIENT
Start: 2021-01-01 | End: 2021-01-01 | Stop reason: HOSPADM

## 2021-01-01 RX ORDER — ASPIRIN 81 MG/1
324 TABLET, CHEWABLE ORAL ONCE
Status: COMPLETED | OUTPATIENT
Start: 2021-01-01 | End: 2021-01-01

## 2021-01-01 RX ORDER — BISACODYL 5 MG/1
5 TABLET, DELAYED RELEASE ORAL DAILY PRN
Status: DISCONTINUED | OUTPATIENT
Start: 2021-01-01 | End: 2021-01-01 | Stop reason: HOSPADM

## 2021-01-01 RX ORDER — ETOPOSIDE 50 MG/1
CAPSULE ORAL
Qty: 31 CAPSULE | Refills: 2 | Status: SHIPPED | OUTPATIENT
Start: 2021-01-01 | End: 2021-01-01

## 2021-01-01 RX ORDER — LABETALOL HYDROCHLORIDE 5 MG/ML
5 INJECTION, SOLUTION INTRAVENOUS
Status: DISCONTINUED | OUTPATIENT
Start: 2021-01-01 | End: 2021-01-01 | Stop reason: HOSPADM

## 2021-01-01 RX ORDER — MAGNESIUM SULFATE HEPTAHYDRATE 40 MG/ML
4 INJECTION, SOLUTION INTRAVENOUS ONCE
Status: DISCONTINUED | OUTPATIENT
Start: 2021-01-01 | End: 2021-01-01

## 2021-01-01 RX ORDER — PHYTONADIONE 2 MG/ML
5 INJECTION, EMULSION INTRAMUSCULAR; INTRAVENOUS; SUBCUTANEOUS ONCE
Status: COMPLETED | OUTPATIENT
Start: 2021-01-01 | End: 2021-01-01

## 2021-01-01 RX ORDER — SODIUM CHLORIDE 9 MG/ML
75 INJECTION, SOLUTION INTRAVENOUS CONTINUOUS
Status: DISCONTINUED | OUTPATIENT
Start: 2021-01-01 | End: 2021-01-01 | Stop reason: HOSPADM

## 2021-01-01 RX ORDER — GUAIFENESIN AND CODEINE PHOSPHATE 100; 10 MG/5ML; MG/5ML
5 SOLUTION ORAL EVERY 6 HOURS PRN
Qty: 600 ML | Refills: 0 | Status: SHIPPED | OUTPATIENT
Start: 2021-01-01 | End: 2021-01-01

## 2021-01-01 RX ORDER — WARFARIN SODIUM 4 MG/1
4 TABLET ORAL
Status: COMPLETED | OUTPATIENT
Start: 2021-01-01 | End: 2021-01-01

## 2021-01-01 RX ORDER — ONDANSETRON 4 MG/1
8 TABLET, FILM COATED ORAL EVERY 6 HOURS PRN
Status: DISCONTINUED | OUTPATIENT
Start: 2021-01-01 | End: 2021-01-01 | Stop reason: HOSPADM

## 2021-01-01 RX ORDER — WARFARIN SODIUM 5 MG/1
TABLET ORAL
Qty: 30 TABLET | Refills: 3 | Status: SHIPPED | OUTPATIENT
Start: 2021-01-01 | End: 2021-01-01 | Stop reason: HOSPADM

## 2021-01-01 RX ADMIN — AZELASTINE HYDROCHLORIDE 2 SPRAY: 137 SPRAY, METERED NASAL at 09:03

## 2021-01-01 RX ADMIN — FOLIC ACID 1 MG: 1 TABLET ORAL at 09:00

## 2021-01-01 RX ADMIN — FOLIC ACID 1000 MCG: 1 TABLET ORAL at 15:42

## 2021-01-01 RX ADMIN — TRIAMTERENE AND HYDROCHLOROTHIAZIDE 1 TABLET: 37.5; 25 TABLET ORAL at 15:42

## 2021-01-01 RX ADMIN — Medication 400 MG: at 15:35

## 2021-01-01 RX ADMIN — PROPOFOL 20 MG: 10 INJECTION, EMULSION INTRAVENOUS at 11:41

## 2021-01-01 RX ADMIN — MAGNESIUM SULFATE HEPTAHYDRATE 2 G: 2 INJECTION, SOLUTION INTRAVENOUS at 09:02

## 2021-01-01 RX ADMIN — EPOETIN ALFA-EPBX 40000 UNITS: 40000 INJECTION, SOLUTION INTRAVENOUS; SUBCUTANEOUS at 11:58

## 2021-01-01 RX ADMIN — CEFTRIAXONE SODIUM 2 G: 2 INJECTION, POWDER, FOR SOLUTION INTRAMUSCULAR; INTRAVENOUS at 20:20

## 2021-01-01 RX ADMIN — ALBUTEROL SULFATE 2 PUFF: 108 INHALANT RESPIRATORY (INHALATION) at 08:23

## 2021-01-01 RX ADMIN — POTASSIUM CHLORIDE 1000 ML: 2 INJECTION, SOLUTION, CONCENTRATE INTRAVENOUS at 13:54

## 2021-01-01 RX ADMIN — IOPAMIDOL 100 ML: 755 INJECTION, SOLUTION INTRAVENOUS at 13:52

## 2021-01-01 RX ADMIN — IOPAMIDOL 100 ML: 755 INJECTION, SOLUTION INTRAVENOUS at 13:44

## 2021-01-01 RX ADMIN — PREGABALIN 100 MG: 100 CAPSULE ORAL at 23:15

## 2021-01-01 RX ADMIN — METHYLPREDNISOLONE SODIUM SUCCINATE 40 MG: 40 INJECTION, POWDER, FOR SOLUTION INTRAMUSCULAR; INTRAVENOUS at 09:04

## 2021-01-01 RX ADMIN — IPRATROPIUM BROMIDE AND ALBUTEROL SULFATE 3 ML: 2.5; .5 SOLUTION RESPIRATORY (INHALATION) at 11:10

## 2021-01-01 RX ADMIN — PREGABALIN 100 MG: 100 CAPSULE ORAL at 08:58

## 2021-01-01 RX ADMIN — FAMOTIDINE 40 MG: 20 TABLET, FILM COATED ORAL at 09:58

## 2021-01-01 RX ADMIN — Medication 10 ML: at 23:15

## 2021-01-01 RX ADMIN — DEXAMETHASONE SODIUM PHOSPHATE 12 MG: 4 INJECTION, SOLUTION INTRA-ARTICULAR; INTRALESIONAL; INTRAMUSCULAR; INTRAVENOUS; SOFT TISSUE at 11:21

## 2021-01-01 RX ADMIN — POTASSIUM CHLORIDE 20 MEQ: 1500 TABLET, EXTENDED RELEASE ORAL at 17:06

## 2021-01-01 RX ADMIN — FAMOTIDINE 40 MG: 20 TABLET ORAL at 08:21

## 2021-01-01 RX ADMIN — PREGABALIN 100 MG: 100 CAPSULE ORAL at 08:12

## 2021-01-01 RX ADMIN — PREGABALIN 100 MG: 100 CAPSULE ORAL at 20:16

## 2021-01-01 RX ADMIN — ALBUTEROL SULFATE 2 PUFF: 108 INHALANT RESPIRATORY (INHALATION) at 20:22

## 2021-01-01 RX ADMIN — POTASSIUM CHLORIDE 40 MEQ: 1500 TABLET, EXTENDED RELEASE ORAL at 15:42

## 2021-01-01 RX ADMIN — PANTOPRAZOLE SODIUM 40 MG: 40 TABLET, DELAYED RELEASE ORAL at 08:50

## 2021-01-01 RX ADMIN — Medication 10 ML: at 20:30

## 2021-01-01 RX ADMIN — GEMCITABINE 1150 MG: 38 INJECTION, SOLUTION INTRAVENOUS at 11:42

## 2021-01-01 RX ADMIN — Medication 10 ML: at 10:49

## 2021-01-01 RX ADMIN — SODIUM CHLORIDE 100 ML/HR: 9 INJECTION, SOLUTION INTRAVENOUS at 00:45

## 2021-01-01 RX ADMIN — OXYCODONE HYDROCHLORIDE AND ACETAMINOPHEN 500 MG: 500 TABLET ORAL at 08:22

## 2021-01-01 RX ADMIN — CISPLATIN 56 MG: 1 INJECTION INTRAVENOUS at 12:27

## 2021-01-01 RX ADMIN — PREGABALIN 100 MG: 100 CAPSULE ORAL at 09:11

## 2021-01-01 RX ADMIN — TRIAMTERENE AND HYDROCHLOROTHIAZIDE 1 TABLET: 37.5; 25 TABLET ORAL at 08:58

## 2021-01-01 RX ADMIN — PROPOFOL 60 MG: 10 INJECTION, EMULSION INTRAVENOUS at 12:49

## 2021-01-01 RX ADMIN — FAMOTIDINE 40 MG: 20 TABLET ORAL at 09:59

## 2021-01-01 RX ADMIN — PREGABALIN 100 MG: 100 CAPSULE ORAL at 16:00

## 2021-01-01 RX ADMIN — IPRATROPIUM BROMIDE AND ALBUTEROL SULFATE 3 ML: 2.5; .5 SOLUTION RESPIRATORY (INHALATION) at 03:08

## 2021-01-01 RX ADMIN — POTASSIUM CHLORIDE 40 MEQ: 1500 TABLET, EXTENDED RELEASE ORAL at 08:12

## 2021-01-01 RX ADMIN — Medication 100 MG: at 09:11

## 2021-01-01 RX ADMIN — Medication 5000 UNITS: at 08:22

## 2021-01-01 RX ADMIN — SODIUM PHOSPHATE, DIBASIC, ANHYDROUS, POTASSIUM PHOSPHATE, MONOBASIC, AND SODIUM PHOSPHATE, MONOBASIC, MONOHYDRATE 1 TABLET: 852; 155; 130 TABLET, COATED ORAL at 08:55

## 2021-01-01 RX ADMIN — VINORELBINE 45 MG: 10 INJECTION, SOLUTION INTRAVENOUS at 12:05

## 2021-01-01 RX ADMIN — CEFTRIAXONE SODIUM 1 G: 1 INJECTION, POWDER, FOR SOLUTION INTRAMUSCULAR; INTRAVENOUS at 18:25

## 2021-01-01 RX ADMIN — ERYTHROMYCIN: 5 OINTMENT OPHTHALMIC at 17:50

## 2021-01-01 RX ADMIN — SERTRALINE HYDROCHLORIDE 50 MG: 50 TABLET ORAL at 22:05

## 2021-01-01 RX ADMIN — Medication 20 ML: at 15:51

## 2021-01-01 RX ADMIN — Medication 100 MG: at 09:02

## 2021-01-01 RX ADMIN — EPOETIN ALFA-EPBX 40000 UNITS: 40000 INJECTION, SOLUTION INTRAVENOUS; SUBCUTANEOUS at 12:55

## 2021-01-01 RX ADMIN — Medication 10 ML: at 08:30

## 2021-01-01 RX ADMIN — GUAIFENESIN 1200 MG: 600 TABLET, EXTENDED RELEASE ORAL at 21:38

## 2021-01-01 RX ADMIN — ACETYLCYSTEINE 2 ML: 200 SOLUTION ORAL; RESPIRATORY (INHALATION) at 19:22

## 2021-01-01 RX ADMIN — MONTELUKAST 10 MG: 10 TABLET, FILM COATED ORAL at 22:05

## 2021-01-01 RX ADMIN — CALCIUM CARBONATE-VITAMIN D TAB 500 MG-200 UNIT 1 TABLET: 500-200 TAB at 00:52

## 2021-01-01 RX ADMIN — METHYLPREDNISOLONE SODIUM SUCCINATE 40 MG: 40 INJECTION, POWDER, FOR SOLUTION INTRAMUSCULAR; INTRAVENOUS at 03:05

## 2021-01-01 RX ADMIN — FAMOTIDINE 40 MG: 20 TABLET, FILM COATED ORAL at 08:30

## 2021-01-01 RX ADMIN — SERTRALINE 50 MG: 50 TABLET, FILM COATED ORAL at 21:34

## 2021-01-01 RX ADMIN — POTASSIUM PHOSPHATE, MONOBASIC AND POTASSIUM PHOSPHATE, DIBASIC 45 MMOL: 224; 236 INJECTION, SOLUTION, CONCENTRATE INTRAVENOUS at 12:12

## 2021-01-01 RX ADMIN — SODIUM CHLORIDE, PRESERVATIVE FREE 3 ML: 5 INJECTION INTRAVENOUS at 08:21

## 2021-01-01 RX ADMIN — Medication 10 ML: at 08:22

## 2021-01-01 RX ADMIN — CETIRIZINE HYDROCHLORIDE 10 MG: 10 TABLET, FILM COATED ORAL at 21:24

## 2021-01-01 RX ADMIN — PREGABALIN 100 MG: 100 CAPSULE ORAL at 00:52

## 2021-01-01 RX ADMIN — Medication 100 MG: at 20:32

## 2021-01-01 RX ADMIN — Medication 400 MG: at 21:39

## 2021-01-01 RX ADMIN — ZINC SULFATE 220 MG (50 MG) CAPSULE 440 MG: CAPSULE at 08:22

## 2021-01-01 RX ADMIN — SODIUM CHLORIDE 75 ML/HR: 9 INJECTION, SOLUTION INTRAVENOUS at 09:15

## 2021-01-01 RX ADMIN — IPRATROPIUM BROMIDE AND ALBUTEROL SULFATE 3 ML: 2.5; .5 SOLUTION RESPIRATORY (INHALATION) at 15:47

## 2021-01-01 RX ADMIN — SODIUM CHLORIDE, PRESERVATIVE FREE 3 ML: 5 INJECTION INTRAVENOUS at 22:20

## 2021-01-01 RX ADMIN — CETIRIZINE HYDROCHLORIDE 10 MG: 10 TABLET, FILM COATED ORAL at 21:38

## 2021-01-01 RX ADMIN — PANTOPRAZOLE SODIUM 40 MG: 40 TABLET, DELAYED RELEASE ORAL at 08:12

## 2021-01-01 RX ADMIN — SODIUM CHLORIDE 1000 MG: 900 INJECTION, SOLUTION INTRAVENOUS at 08:58

## 2021-01-01 RX ADMIN — SODIUM CHLORIDE, PRESERVATIVE FREE 3 ML: 5 INJECTION INTRAVENOUS at 22:37

## 2021-01-01 RX ADMIN — PREGABALIN 100 MG: 100 CAPSULE ORAL at 08:54

## 2021-01-01 RX ADMIN — ERYTHROMYCIN: 5 OINTMENT OPHTHALMIC at 09:15

## 2021-01-01 RX ADMIN — ZINC SULFATE 220 MG (50 MG) CAPSULE 440 MG: CAPSULE at 09:11

## 2021-01-01 RX ADMIN — PHYTONADIONE 10 MG: 10 INJECTION, EMULSION INTRAMUSCULAR; INTRAVENOUS; SUBCUTANEOUS at 15:53

## 2021-01-01 RX ADMIN — WARFARIN 3 MG: 3 TABLET ORAL at 17:31

## 2021-01-01 RX ADMIN — POTASSIUM CHLORIDE 20 MEQ: 1500 TABLET, EXTENDED RELEASE ORAL at 17:49

## 2021-01-01 RX ADMIN — IPRATROPIUM BROMIDE AND ALBUTEROL SULFATE 3 ML: 2.5; .5 SOLUTION RESPIRATORY (INHALATION) at 08:02

## 2021-01-01 RX ADMIN — DOCUSATE SODIUM 50 MG AND SENNOSIDES 8.6 MG 2 TABLET: 8.6; 5 TABLET, FILM COATED ORAL at 08:20

## 2021-01-01 RX ADMIN — PREGABALIN 100 MG: 100 CAPSULE ORAL at 20:31

## 2021-01-01 RX ADMIN — TRIAMTERENE AND HYDROCHLOROTHIAZIDE 1 TABLET: 37.5; 25 TABLET ORAL at 14:38

## 2021-01-01 RX ADMIN — CALCIUM CARBONATE-VITAMIN D TAB 500 MG-200 UNIT 1 TABLET: 500-200 TAB at 21:04

## 2021-01-01 RX ADMIN — GUAIFENESIN 1200 MG: 600 TABLET, EXTENDED RELEASE ORAL at 08:55

## 2021-01-01 RX ADMIN — THEOPHYLLINE ANHYDROUS 300 MG: 300 CAPSULE, EXTENDED RELEASE ORAL at 08:12

## 2021-01-01 RX ADMIN — CETIRIZINE HYDROCHLORIDE 10 MG: 10 TABLET, FILM COATED ORAL at 20:44

## 2021-01-01 RX ADMIN — PREGABALIN 100 MG: 100 CAPSULE ORAL at 15:42

## 2021-01-01 RX ADMIN — ALBUTEROL SULFATE 2 PUFF: 90 AEROSOL, METERED RESPIRATORY (INHALATION) at 18:50

## 2021-01-01 RX ADMIN — CETIRIZINE HYDROCHLORIDE 10 MG: 10 TABLET, FILM COATED ORAL at 21:34

## 2021-01-01 RX ADMIN — CEFTRIAXONE SODIUM 1 G: 1 INJECTION, POWDER, FOR SOLUTION INTRAMUSCULAR; INTRAVENOUS at 22:37

## 2021-01-01 RX ADMIN — PROPOFOL 20 MG: 10 INJECTION, EMULSION INTRAVENOUS at 11:35

## 2021-01-01 RX ADMIN — IPRATROPIUM BROMIDE AND ALBUTEROL SULFATE 3 ML: 2.5; .5 SOLUTION RESPIRATORY (INHALATION) at 10:57

## 2021-01-01 RX ADMIN — SERTRALINE HYDROCHLORIDE 50 MG: 50 TABLET ORAL at 23:15

## 2021-01-01 RX ADMIN — ERYTHROMYCIN: 5 OINTMENT OPHTHALMIC at 08:54

## 2021-01-01 RX ADMIN — FOLIC ACID 1000 MCG: 1 TABLET ORAL at 08:55

## 2021-01-01 RX ADMIN — Medication 10 ML: at 20:31

## 2021-01-01 RX ADMIN — SODIUM PHOSPHATE, DIBASIC, ANHYDROUS, POTASSIUM PHOSPHATE, MONOBASIC, AND SODIUM PHOSPHATE, MONOBASIC, MONOHYDRATE 1 TABLET: 852; 155; 130 TABLET, COATED ORAL at 09:59

## 2021-01-01 RX ADMIN — Medication 100 MG: at 08:22

## 2021-01-01 RX ADMIN — DENOSUMAB 120 MG: 120 INJECTION SUBCUTANEOUS at 13:21

## 2021-01-01 RX ADMIN — HEPARIN 500 UNITS: 100 SYRINGE at 12:59

## 2021-01-01 RX ADMIN — TRIAMTERENE AND HYDROCHLOROTHIAZIDE 1 TABLET: 37.5; 25 TABLET ORAL at 08:50

## 2021-01-01 RX ADMIN — ALBUTEROL SULFATE 2 PUFF: 108 INHALANT RESPIRATORY (INHALATION) at 12:12

## 2021-01-01 RX ADMIN — METHYLPREDNISOLONE SODIUM SUCCINATE 40 MG: 40 INJECTION, POWDER, FOR SOLUTION INTRAMUSCULAR; INTRAVENOUS at 16:48

## 2021-01-01 RX ADMIN — ALBUTEROL SULFATE 2 PUFF: 108 INHALANT RESPIRATORY (INHALATION) at 19:08

## 2021-01-01 RX ADMIN — ERYTHROMYCIN: 5 OINTMENT OPHTHALMIC at 17:15

## 2021-01-01 RX ADMIN — OXYCODONE HYDROCHLORIDE AND ACETAMINOPHEN 500 MG: 500 TABLET ORAL at 09:03

## 2021-01-01 RX ADMIN — CALCIUM GLUCONATE 2 G: 20 INJECTION, SOLUTION INTRAVENOUS at 17:46

## 2021-01-01 RX ADMIN — POTASSIUM CHLORIDE 40 MEQ: 1500 TABLET, EXTENDED RELEASE ORAL at 17:31

## 2021-01-01 RX ADMIN — DEXAMETHASONE 6 MG: 6 TABLET ORAL at 08:22

## 2021-01-01 RX ADMIN — ALBUTEROL SULFATE 2 PUFF: 108 INHALANT RESPIRATORY (INHALATION) at 16:00

## 2021-01-01 RX ADMIN — PREGABALIN 100 MG: 100 CAPSULE ORAL at 16:09

## 2021-01-01 RX ADMIN — ALBUTEROL SULFATE 2 PUFF: 108 INHALANT RESPIRATORY (INHALATION) at 12:10

## 2021-01-01 RX ADMIN — IOPAMIDOL 100 ML: 755 INJECTION, SOLUTION INTRAVENOUS at 08:59

## 2021-01-01 RX ADMIN — SODIUM CHLORIDE 250 ML: 9 INJECTION, SOLUTION INTRAVENOUS at 10:53

## 2021-01-01 RX ADMIN — Medication 10 ML: at 21:35

## 2021-01-01 RX ADMIN — SODIUM CHLORIDE, PRESERVATIVE FREE 3 ML: 5 INJECTION INTRAVENOUS at 09:15

## 2021-01-01 RX ADMIN — CISPLATIN 56 MG: 1 INJECTION INTRAVENOUS at 11:27

## 2021-01-01 RX ADMIN — Medication 10 ML: at 00:45

## 2021-01-01 RX ADMIN — ERYTHROMYCIN: 5 OINTMENT OPHTHALMIC at 09:58

## 2021-01-01 RX ADMIN — SODIUM CHLORIDE 250 ML: 9 INJECTION, SOLUTION INTRAVENOUS at 12:09

## 2021-01-01 RX ADMIN — DENOSUMAB 120 MG: 120 INJECTION SUBCUTANEOUS at 12:27

## 2021-01-01 RX ADMIN — ONDANSETRON HYDROCHLORIDE 4 MG: 4 TABLET, FILM COATED ORAL at 16:02

## 2021-01-01 RX ADMIN — MAGNESIUM GLUCONATE 500 MG ORAL TABLET 400 MG: 500 TABLET ORAL at 09:00

## 2021-01-01 RX ADMIN — IOPAMIDOL 100 ML: 755 INJECTION, SOLUTION INTRAVENOUS at 12:42

## 2021-01-01 RX ADMIN — Medication 400 MG: at 08:55

## 2021-01-01 RX ADMIN — OXYCODONE 20 MG: 5 TABLET ORAL at 15:46

## 2021-01-01 RX ADMIN — MICAFUNGIN SODIUM 150 MG: 100 INJECTION, POWDER, LYOPHILIZED, FOR SOLUTION INTRAVENOUS at 17:16

## 2021-01-01 RX ADMIN — CETIRIZINE HYDROCHLORIDE 10 MG: 10 TABLET, FILM COATED ORAL at 20:57

## 2021-01-01 RX ADMIN — GUAIFENESIN 1200 MG: 600 TABLET, EXTENDED RELEASE ORAL at 21:25

## 2021-01-01 RX ADMIN — GUAIFENESIN 1200 MG: 600 TABLET, EXTENDED RELEASE ORAL at 09:58

## 2021-01-01 RX ADMIN — ALBUTEROL SULFATE 2 PUFF: 108 INHALANT RESPIRATORY (INHALATION) at 08:33

## 2021-01-01 RX ADMIN — HEPARIN 500 UNITS: 100 SYRINGE at 14:17

## 2021-01-01 RX ADMIN — IOPAMIDOL 91 ML: 755 INJECTION, SOLUTION INTRAVENOUS at 20:36

## 2021-01-01 RX ADMIN — LIDOCAINE HYDROCHLORIDE 100 MG: 10 INJECTION, SOLUTION EPIDURAL; INFILTRATION; INTRACAUDAL; PERINEURAL at 12:19

## 2021-01-01 RX ADMIN — IPRATROPIUM BROMIDE AND ALBUTEROL SULFATE 3 ML: 2.5; .5 SOLUTION RESPIRATORY (INHALATION) at 07:42

## 2021-01-01 RX ADMIN — WARFARIN 4 MG: 4 TABLET ORAL at 18:39

## 2021-01-01 RX ADMIN — ESMOLOL HYDROCHLORIDE 10 MG: 10 INJECTION, SOLUTION INTRAVENOUS at 12:39

## 2021-01-01 RX ADMIN — CALCIUM CARBONATE-VITAMIN D TAB 500 MG-200 UNIT 1 TABLET: 500-200 TAB at 21:24

## 2021-01-01 RX ADMIN — MONTELUKAST 10 MG: 10 TABLET, FILM COATED ORAL at 21:38

## 2021-01-01 RX ADMIN — DOXYCYCLINE 100 MG: 100 INJECTION, POWDER, LYOPHILIZED, FOR SOLUTION INTRAVENOUS at 09:11

## 2021-01-01 RX ADMIN — Medication 5000 UNITS: at 09:02

## 2021-01-01 RX ADMIN — WARFARIN 5 MG: 5 TABLET ORAL at 21:34

## 2021-01-01 RX ADMIN — PREGABALIN 100 MG: 100 CAPSULE ORAL at 09:59

## 2021-01-01 RX ADMIN — FOLIC ACID 1000 MCG: 1 TABLET ORAL at 08:12

## 2021-01-01 RX ADMIN — POTASSIUM CHLORIDE 40 MEQ: 1500 TABLET, EXTENDED RELEASE ORAL at 15:14

## 2021-01-01 RX ADMIN — DEXAMETHASONE 6 MG: 6 TABLET ORAL at 09:02

## 2021-01-01 RX ADMIN — IOPAMIDOL 100 ML: 755 INJECTION, SOLUTION INTRAVENOUS at 14:55

## 2021-01-01 RX ADMIN — SODIUM CHLORIDE: 0.9 INJECTION, SOLUTION INTRAVENOUS at 12:33

## 2021-01-01 RX ADMIN — WARFARIN 2.5 MG: 2.5 TABLET ORAL at 17:45

## 2021-01-01 RX ADMIN — ALBUTEROL SULFATE 2 PUFF: 108 INHALANT RESPIRATORY (INHALATION) at 07:05

## 2021-01-01 RX ADMIN — SERTRALINE HYDROCHLORIDE 50 MG: 50 TABLET ORAL at 20:16

## 2021-01-01 RX ADMIN — MONTELUKAST 10 MG: 10 TABLET, FILM COATED ORAL at 23:15

## 2021-01-01 RX ADMIN — WATER 1 G: 100 INJECTION, SOLUTION INTRAVENOUS at 23:17

## 2021-01-01 RX ADMIN — SODIUM CHLORIDE 250 ML: 9 INJECTION, SOLUTION INTRAVENOUS at 10:17

## 2021-01-01 RX ADMIN — IOPAMIDOL 100 ML: 755 INJECTION, SOLUTION INTRAVENOUS at 11:40

## 2021-01-01 RX ADMIN — ATORVASTATIN CALCIUM 20 MG: 20 TABLET, FILM COATED ORAL at 08:50

## 2021-01-01 RX ADMIN — PANTOPRAZOLE SODIUM 40 MG: 40 TABLET, DELAYED RELEASE ORAL at 05:16

## 2021-01-01 RX ADMIN — MONTELUKAST 10 MG: 10 TABLET, FILM COATED ORAL at 21:24

## 2021-01-01 RX ADMIN — EPOETIN ALFA-EPBX 40000 UNITS: 40000 INJECTION, SOLUTION INTRAVENOUS; SUBCUTANEOUS at 09:10

## 2021-01-01 RX ADMIN — ALBUTEROL SULFATE 2 PUFF: 108 INHALANT RESPIRATORY (INHALATION) at 21:11

## 2021-01-01 RX ADMIN — CETIRIZINE HYDROCHLORIDE 10 MG: 10 TABLET, FILM COATED ORAL at 23:15

## 2021-01-01 RX ADMIN — AZELASTINE HYDROCHLORIDE 2 SPRAY: 137 SPRAY, METERED NASAL at 08:59

## 2021-01-01 RX ADMIN — MICAFUNGIN SODIUM 150 MG: 100 INJECTION, POWDER, LYOPHILIZED, FOR SOLUTION INTRAVENOUS at 17:49

## 2021-01-01 RX ADMIN — ERYTHROMYCIN 1 APPLICATION: 5 OINTMENT OPHTHALMIC at 21:39

## 2021-01-01 RX ADMIN — ACETYLCYSTEINE 2 ML: 200 SOLUTION ORAL; RESPIRATORY (INHALATION) at 07:02

## 2021-01-01 RX ADMIN — PHYTONADIONE 10 MG: 10 INJECTION, EMULSION INTRAMUSCULAR; INTRAVENOUS; SUBCUTANEOUS at 20:48

## 2021-01-01 RX ADMIN — Medication 20 ML: at 10:45

## 2021-01-01 RX ADMIN — MAGNESIUM SULFATE HEPTAHYDRATE 500 ML: 500 INJECTION, SOLUTION INTRAMUSCULAR; INTRAVENOUS at 08:44

## 2021-01-01 RX ADMIN — GUAIFENESIN 1200 MG: 600 TABLET, EXTENDED RELEASE ORAL at 14:38

## 2021-01-01 RX ADMIN — FLUCONAZOLE 400 MG: 100 TABLET ORAL at 14:02

## 2021-01-01 RX ADMIN — FAMOTIDINE 40 MG: 20 TABLET, FILM COATED ORAL at 08:22

## 2021-01-01 RX ADMIN — FAMOTIDINE 40 MG: 20 TABLET ORAL at 08:55

## 2021-01-01 RX ADMIN — ALBUTEROL SULFATE 2 PUFF: 108 INHALANT RESPIRATORY (INHALATION) at 10:45

## 2021-01-01 RX ADMIN — Medication 400 MG: at 08:20

## 2021-01-01 RX ADMIN — Medication 10 ML: at 08:12

## 2021-01-01 RX ADMIN — CALCIUM GLUCONATE 2 G: 20 INJECTION, SOLUTION INTRAVENOUS at 14:55

## 2021-01-01 RX ADMIN — MAGNESIUM SULFATE HEPTAHYDRATE 2 G: 2 INJECTION, SOLUTION INTRAVENOUS at 08:22

## 2021-01-01 RX ADMIN — FOLIC ACID 1000 MCG: 1 TABLET ORAL at 08:50

## 2021-01-01 RX ADMIN — DOCUSATE SODIUM 50 MG AND SENNOSIDES 8.6 MG 2 TABLET: 8.6; 5 TABLET, FILM COATED ORAL at 22:23

## 2021-01-01 RX ADMIN — ERYTHROMYCIN: 5 OINTMENT OPHTHALMIC at 14:39

## 2021-01-01 RX ADMIN — HEPARIN 500 UNITS: 100 SYRINGE at 13:23

## 2021-01-01 RX ADMIN — POTASSIUM CHLORIDE 20 MEQ: 1500 TABLET, EXTENDED RELEASE ORAL at 16:15

## 2021-01-01 RX ADMIN — PREGABALIN 100 MG: 100 CAPSULE ORAL at 08:59

## 2021-01-01 RX ADMIN — WARFARIN 2 MG: 2 TABLET ORAL at 17:49

## 2021-01-01 RX ADMIN — PHYTONADIONE 5 MG: 10 INJECTION, EMULSION INTRAMUSCULAR; INTRAVENOUS; SUBCUTANEOUS at 01:49

## 2021-01-01 RX ADMIN — PREGABALIN 100 MG: 100 CAPSULE ORAL at 08:22

## 2021-01-01 RX ADMIN — MAGNESIUM OXIDE TAB 400 MG (241.3 MG ELEMENTAL MG) 400 MG: 400 (241.3 MG) TAB at 08:50

## 2021-01-01 RX ADMIN — Medication 10 ML: at 15:04

## 2021-01-01 RX ADMIN — PREGABALIN 100 MG: 100 CAPSULE ORAL at 16:19

## 2021-01-01 RX ADMIN — SODIUM PHOSPHATE, DIBASIC, ANHYDROUS, POTASSIUM PHOSPHATE, MONOBASIC, AND SODIUM PHOSPHATE, MONOBASIC, MONOHYDRATE 1 TABLET: 852; 155; 130 TABLET, COATED ORAL at 21:34

## 2021-01-01 RX ADMIN — POTASSIUM CHLORIDE 40 MEQ: 1500 TABLET, EXTENDED RELEASE ORAL at 08:50

## 2021-01-01 RX ADMIN — EPOETIN ALFA-EPBX 40000 UNITS: 40000 INJECTION, SOLUTION INTRAVENOUS; SUBCUTANEOUS at 13:22

## 2021-01-01 RX ADMIN — PROPOFOL 550 MG: 10 INJECTION, EMULSION INTRAVENOUS at 12:19

## 2021-01-01 RX ADMIN — CALCIUM GLUCONATE 2 G: 20 INJECTION, SOLUTION INTRAVENOUS at 19:44

## 2021-01-01 RX ADMIN — Medication 10 ML: at 14:17

## 2021-01-01 RX ADMIN — PREGABALIN 100 MG: 100 CAPSULE ORAL at 20:57

## 2021-01-01 RX ADMIN — LETROZOLE 2.5 MG: 2.5 TABLET ORAL at 17:06

## 2021-01-01 RX ADMIN — ENOXAPARIN SODIUM 70 MG: 80 INJECTION, SOLUTION INTRAVENOUS; SUBCUTANEOUS at 20:51

## 2021-01-01 RX ADMIN — ENOXAPARIN SODIUM 70 MG: 80 INJECTION SUBCUTANEOUS at 21:25

## 2021-01-01 RX ADMIN — SODIUM CHLORIDE 250 ML: 9 INJECTION, SOLUTION INTRAVENOUS at 12:04

## 2021-01-01 RX ADMIN — SODIUM CHLORIDE 100 ML/HR: 9 INJECTION, SOLUTION INTRAVENOUS at 05:05

## 2021-01-01 RX ADMIN — ERYTHROMYCIN 1 APPLICATION: 5 OINTMENT OPHTHALMIC at 20:58

## 2021-01-01 RX ADMIN — Medication 400 MG: at 14:38

## 2021-01-01 RX ADMIN — MAGNESIUM SULFATE IN DEXTROSE 1 G: 10 INJECTION, SOLUTION INTRAVENOUS at 11:54

## 2021-01-01 RX ADMIN — ENOXAPARIN SODIUM 70 MG: 80 INJECTION SUBCUTANEOUS at 08:20

## 2021-01-01 RX ADMIN — SODIUM CHLORIDE 1000 MG: 900 INJECTION, SOLUTION INTRAVENOUS at 06:05

## 2021-01-01 RX ADMIN — HYDROMORPHONE HYDROCHLORIDE 0.5 MG: 2 INJECTION, SOLUTION INTRAMUSCULAR; INTRAVENOUS; SUBCUTANEOUS at 20:21

## 2021-01-01 RX ADMIN — WATER 1 G: 100 INJECTION, SOLUTION INTRAVENOUS at 21:34

## 2021-01-01 RX ADMIN — OXYCODONE HYDROCHLORIDE AND ACETAMINOPHEN 500 MG: 500 TABLET ORAL at 09:59

## 2021-01-01 RX ADMIN — Medication 10 ML: at 13:19

## 2021-01-01 RX ADMIN — SODIUM PHOSPHATE, DIBASIC, ANHYDROUS, POTASSIUM PHOSPHATE, MONOBASIC, AND SODIUM PHOSPHATE, MONOBASIC, MONOHYDRATE 1 TABLET: 852; 155; 130 TABLET, COATED ORAL at 15:42

## 2021-01-01 RX ADMIN — HEPARIN 500 UNITS: 100 SYRINGE at 15:04

## 2021-01-01 RX ADMIN — AZITHROMYCIN MONOHYDRATE 500 MG: 250 TABLET ORAL at 20:08

## 2021-01-01 RX ADMIN — PALONOSETRON 0.25 MG: 0.25 INJECTION, SOLUTION INTRAVENOUS at 10:53

## 2021-01-01 RX ADMIN — Medication 400 MG: at 15:42

## 2021-01-01 RX ADMIN — FOLIC ACID 1000 MCG: 1 TABLET ORAL at 14:39

## 2021-01-01 RX ADMIN — GADOTERIDOL 17 ML: 279.3 INJECTION, SOLUTION INTRAVENOUS at 11:53

## 2021-01-01 RX ADMIN — PREGABALIN 100 MG: 100 CAPSULE ORAL at 15:35

## 2021-01-01 RX ADMIN — CEFDINIR 300 MG: 300 CAPSULE ORAL at 11:13

## 2021-01-01 RX ADMIN — VINORELBINE 45 MG: 10 INJECTION, SOLUTION INTRAVENOUS at 11:59

## 2021-01-01 RX ADMIN — DOCUSATE SODIUM 50 MG AND SENNOSIDES 8.6 MG 2 TABLET: 8.6; 5 TABLET, FILM COATED ORAL at 20:57

## 2021-01-01 RX ADMIN — Medication 5000 UNITS: at 08:30

## 2021-01-01 RX ADMIN — MONTELUKAST 10 MG: 10 TABLET, FILM COATED ORAL at 20:57

## 2021-01-01 RX ADMIN — Medication 10 ML: at 20:44

## 2021-01-01 RX ADMIN — SODIUM CHLORIDE 75 ML/HR: 9 INJECTION, SOLUTION INTRAVENOUS at 00:17

## 2021-01-01 RX ADMIN — OXYCODONE HYDROCHLORIDE AND ACETAMINOPHEN 500 MG: 500 TABLET ORAL at 09:11

## 2021-01-01 RX ADMIN — SODIUM CHLORIDE, SODIUM LACTATE, POTASSIUM CHLORIDE, AND CALCIUM CHLORIDE 20 ML/HR: .6; .31; .03; .02 INJECTION, SOLUTION INTRAVENOUS at 10:34

## 2021-01-01 RX ADMIN — GADOTERIDOL 15 ML: 279.3 INJECTION, SOLUTION INTRAVENOUS at 09:44

## 2021-01-01 RX ADMIN — GADOTERIDOL 15 ML: 279.3 INJECTION, SOLUTION INTRAVENOUS at 10:47

## 2021-01-01 RX ADMIN — CETIRIZINE HYDROCHLORIDE 10 MG: 10 TABLET, FILM COATED ORAL at 21:35

## 2021-01-01 RX ADMIN — HEPARIN 500 UNITS: 100 SYRINGE at 16:18

## 2021-01-01 RX ADMIN — PANTOPRAZOLE SODIUM 40 MG: 40 TABLET, DELAYED RELEASE ORAL at 16:47

## 2021-01-01 RX ADMIN — IPRATROPIUM BROMIDE AND ALBUTEROL SULFATE 3 ML: 2.5; .5 SOLUTION RESPIRATORY (INHALATION) at 19:12

## 2021-01-01 RX ADMIN — ATORVASTATIN CALCIUM 20 MG: 20 TABLET, FILM COATED ORAL at 08:12

## 2021-01-01 RX ADMIN — IPRATROPIUM BROMIDE AND ALBUTEROL SULFATE 3 ML: 2.5; .5 SOLUTION RESPIRATORY (INHALATION) at 19:30

## 2021-01-01 RX ADMIN — MAGNESIUM GLUCONATE 500 MG ORAL TABLET 400 MG: 500 TABLET ORAL at 20:16

## 2021-01-01 RX ADMIN — Medication 10 ML: at 16:01

## 2021-01-01 RX ADMIN — DENOSUMAB 120 MG: 120 INJECTION SUBCUTANEOUS at 13:25

## 2021-01-01 RX ADMIN — SERTRALINE HYDROCHLORIDE 50 MG: 50 TABLET ORAL at 21:23

## 2021-01-01 RX ADMIN — Medication 400 MG: at 20:58

## 2021-01-01 RX ADMIN — POTASSIUM CHLORIDE 40 MEQ: 1500 TABLET, EXTENDED RELEASE ORAL at 22:57

## 2021-01-01 RX ADMIN — DEXAMETHASONE SODIUM PHOSPHATE 12 MG: 4 INJECTION, SOLUTION INTRA-ARTICULAR; INTRALESIONAL; INTRAMUSCULAR; INTRAVENOUS; SOFT TISSUE at 10:30

## 2021-01-01 RX ADMIN — SODIUM CHLORIDE 100 ML/HR: 9 INJECTION, SOLUTION INTRAVENOUS at 21:33

## 2021-01-01 RX ADMIN — SODIUM PHOSPHATE, DIBASIC, ANHYDROUS, POTASSIUM PHOSPHATE, MONOBASIC, AND SODIUM PHOSPHATE, MONOBASIC, MONOHYDRATE 1 TABLET: 852; 155; 130 TABLET, COATED ORAL at 14:38

## 2021-01-01 RX ADMIN — CETIRIZINE HYDROCHLORIDE 10 MG: 10 TABLET, FILM COATED ORAL at 20:51

## 2021-01-01 RX ADMIN — ENOXAPARIN SODIUM 70 MG: 80 INJECTION, SOLUTION INTRAVENOUS; SUBCUTANEOUS at 12:12

## 2021-01-01 RX ADMIN — CETIRIZINE HYDROCHLORIDE 10 MG: 10 TABLET, FILM COATED ORAL at 20:16

## 2021-01-01 RX ADMIN — SODIUM CHLORIDE 100 ML/HR: 9 INJECTION, SOLUTION INTRAVENOUS at 17:46

## 2021-01-01 RX ADMIN — SODIUM PHOSPHATE, DIBASIC, ANHYDROUS, POTASSIUM PHOSPHATE, MONOBASIC, AND SODIUM PHOSPHATE, MONOBASIC, MONOHYDRATE 1 TABLET: 852; 155; 130 TABLET, COATED ORAL at 21:24

## 2021-01-01 RX ADMIN — PROPOFOL 30 MG: 10 INJECTION, EMULSION INTRAVENOUS at 11:33

## 2021-01-01 RX ADMIN — CALCIUM GLUCONATE 2 G: 20 INJECTION, SOLUTION INTRAVENOUS at 00:31

## 2021-01-01 RX ADMIN — WARFARIN 2 MG: 2 TABLET ORAL at 18:09

## 2021-01-01 RX ADMIN — CEFTRIAXONE SODIUM 1 G: 1 INJECTION, POWDER, FOR SOLUTION INTRAMUSCULAR; INTRAVENOUS at 19:19

## 2021-01-01 RX ADMIN — EPOETIN ALFA-EPBX 40000 UNITS: 40000 INJECTION, SOLUTION INTRAVENOUS; SUBCUTANEOUS at 09:55

## 2021-01-01 RX ADMIN — WARFARIN 3 MG: 3 TABLET ORAL at 17:17

## 2021-01-01 RX ADMIN — IPRATROPIUM BROMIDE AND ALBUTEROL SULFATE 3 ML: 2.5; .5 SOLUTION RESPIRATORY (INHALATION) at 07:02

## 2021-01-01 RX ADMIN — MAGNESIUM GLUCONATE 500 MG ORAL TABLET 400 MG: 500 TABLET ORAL at 20:44

## 2021-01-01 RX ADMIN — HEPARIN 500 UNITS: 100 SYRINGE at 16:02

## 2021-01-01 RX ADMIN — Medication 100 MG: at 08:30

## 2021-01-01 RX ADMIN — OXYCODONE HYDROCHLORIDE AND ACETAMINOPHEN 500 MG: 500 TABLET ORAL at 22:04

## 2021-01-01 RX ADMIN — VINORELBINE 45 MG: 10 INJECTION, SOLUTION INTRAVENOUS at 14:47

## 2021-01-01 RX ADMIN — ENOXAPARIN SODIUM 70 MG: 80 INJECTION, SOLUTION INTRAVENOUS; SUBCUTANEOUS at 22:16

## 2021-01-01 RX ADMIN — PHYTONADIONE 5 MG: 10 INJECTION, EMULSION INTRAMUSCULAR; INTRAVENOUS; SUBCUTANEOUS at 21:34

## 2021-01-01 RX ADMIN — CEFTRIAXONE SODIUM 1 G: 1 INJECTION, POWDER, FOR SOLUTION INTRAMUSCULAR; INTRAVENOUS at 19:54

## 2021-01-01 RX ADMIN — SODIUM CHLORIDE, PRESERVATIVE FREE 3 ML: 5 INJECTION INTRAVENOUS at 08:49

## 2021-01-01 RX ADMIN — PREGABALIN 100 MG: 100 CAPSULE ORAL at 17:16

## 2021-01-01 RX ADMIN — REMDESIVIR 100 MG: 100 INJECTION, POWDER, LYOPHILIZED, FOR SOLUTION INTRAVENOUS at 20:49

## 2021-01-01 RX ADMIN — DEXAMETHASONE SODIUM PHOSPHATE 6 MG: 4 INJECTION, SOLUTION INTRAMUSCULAR; INTRAVENOUS at 09:59

## 2021-01-01 RX ADMIN — GEMCITABINE 1150 MG: 38 INJECTION, SOLUTION INTRAVENOUS at 12:17

## 2021-01-01 RX ADMIN — ALBUTEROL SULFATE 2 PUFF: 108 INHALANT RESPIRATORY (INHALATION) at 16:23

## 2021-01-01 RX ADMIN — ATORVASTATIN CALCIUM 20 MG: 20 TABLET, FILM COATED ORAL at 20:16

## 2021-01-01 RX ADMIN — DEXAMETHASONE SODIUM PHOSPHATE 6 MG: 10 INJECTION, SOLUTION INTRAMUSCULAR; INTRAVENOUS at 20:30

## 2021-01-01 RX ADMIN — CALCIUM CARBONATE-VITAMIN D TAB 500 MG-200 UNIT 1 TABLET: 500-200 TAB at 21:38

## 2021-01-01 RX ADMIN — OXYCODONE HYDROCHLORIDE AND ACETAMINOPHEN 500 MG: 500 TABLET ORAL at 08:30

## 2021-01-01 RX ADMIN — MAGNESIUM OXIDE TAB 400 MG (241.3 MG ELEMENTAL MG) 400 MG: 400 (241.3 MG) TAB at 08:12

## 2021-01-01 RX ADMIN — MAGNESIUM SULFATE IN DEXTROSE 1 G: 10 INJECTION, SOLUTION INTRAVENOUS at 09:04

## 2021-01-01 RX ADMIN — SODIUM PHOSPHATE, DIBASIC, ANHYDROUS, POTASSIUM PHOSPHATE, MONOBASIC, AND SODIUM PHOSPHATE, MONOBASIC, MONOHYDRATE 1 TABLET: 852; 155; 130 TABLET, COATED ORAL at 16:09

## 2021-01-01 RX ADMIN — SODIUM CHLORIDE 100 ML: 9 INJECTION, SOLUTION INTRAVENOUS at 10:55

## 2021-01-01 RX ADMIN — PREGABALIN 100 MG: 100 CAPSULE ORAL at 09:58

## 2021-01-01 RX ADMIN — WARFARIN 4 MG: 4 TABLET ORAL at 16:14

## 2021-01-01 RX ADMIN — HEPARIN 500 UNITS: 100 SYRINGE at 10:49

## 2021-01-01 RX ADMIN — PHYTONADIONE 2.5 MG: 10 INJECTION, EMULSION INTRAMUSCULAR; INTRAVENOUS; SUBCUTANEOUS at 10:33

## 2021-01-01 RX ADMIN — MONTELUKAST 10 MG: 10 TABLET, FILM COATED ORAL at 21:23

## 2021-01-01 RX ADMIN — Medication 5000 UNITS: at 09:11

## 2021-01-01 RX ADMIN — FAMOTIDINE 40 MG: 20 TABLET ORAL at 14:38

## 2021-01-01 RX ADMIN — PREGABALIN 100 MG: 100 CAPSULE ORAL at 15:14

## 2021-01-01 RX ADMIN — ALBUTEROL SULFATE 2 PUFF: 108 INHALANT RESPIRATORY (INHALATION) at 20:54

## 2021-01-01 RX ADMIN — CEFTRIAXONE SODIUM 2 G: 2 INJECTION, POWDER, FOR SOLUTION INTRAMUSCULAR; INTRAVENOUS at 20:44

## 2021-01-01 RX ADMIN — FOLIC ACID 1000 MCG: 1 TABLET ORAL at 09:59

## 2021-01-01 RX ADMIN — FOLIC ACID 1 MG: 1 TABLET ORAL at 08:58

## 2021-01-01 RX ADMIN — DENOSUMAB 120 MG: 120 INJECTION SUBCUTANEOUS at 12:57

## 2021-01-01 RX ADMIN — ACETYLCYSTEINE 2 ML: 200 SOLUTION ORAL; RESPIRATORY (INHALATION) at 07:18

## 2021-01-01 RX ADMIN — SODIUM CHLORIDE 75 ML/HR: 9 INJECTION, SOLUTION INTRAVENOUS at 22:22

## 2021-01-01 RX ADMIN — IPRATROPIUM BROMIDE AND ALBUTEROL SULFATE 3 ML: 2.5; .5 SOLUTION RESPIRATORY (INHALATION) at 15:39

## 2021-01-01 RX ADMIN — SODIUM CHLORIDE, PRESERVATIVE FREE 3 ML: 5 INJECTION INTRAVENOUS at 10:00

## 2021-01-01 RX ADMIN — ESMOLOL HYDROCHLORIDE 10 MG: 10 INJECTION, SOLUTION INTRAVENOUS at 12:37

## 2021-01-01 RX ADMIN — PREGABALIN 100 MG: 100 CAPSULE ORAL at 17:45

## 2021-01-01 RX ADMIN — CETIRIZINE HYDROCHLORIDE 10 MG: 10 TABLET, FILM COATED ORAL at 22:05

## 2021-01-01 RX ADMIN — PREGABALIN 100 MG: 100 CAPSULE ORAL at 21:23

## 2021-01-01 RX ADMIN — POTASSIUM CHLORIDE 1000 ML: 2 INJECTION, SOLUTION, CONCENTRATE INTRAVENOUS at 08:31

## 2021-01-01 RX ADMIN — SODIUM PHOSPHATE, DIBASIC, ANHYDROUS, POTASSIUM PHOSPHATE, MONOBASIC, AND SODIUM PHOSPHATE, MONOBASIC, MONOHYDRATE 1 TABLET: 852; 155; 130 TABLET, COATED ORAL at 15:35

## 2021-01-01 RX ADMIN — Medication 400 MG: at 09:59

## 2021-01-01 RX ADMIN — Medication 10 ML: at 09:00

## 2021-01-01 RX ADMIN — ENOXAPARIN SODIUM 70 MG: 80 INJECTION, SOLUTION INTRAVENOUS; SUBCUTANEOUS at 11:09

## 2021-01-01 RX ADMIN — GUAIFENESIN 1200 MG: 600 TABLET, EXTENDED RELEASE ORAL at 21:00

## 2021-01-01 RX ADMIN — WARFARIN 6 MG: 6 TABLET ORAL at 18:05

## 2021-01-01 RX ADMIN — PREGABALIN 100 MG: 100 CAPSULE ORAL at 18:05

## 2021-01-01 RX ADMIN — SERTRALINE 50 MG: 50 TABLET, FILM COATED ORAL at 21:25

## 2021-01-01 RX ADMIN — Medication 100 MG: at 10:02

## 2021-01-01 RX ADMIN — REMDESIVIR 200 MG: 100 INJECTION, POWDER, LYOPHILIZED, FOR SOLUTION INTRAVENOUS at 20:34

## 2021-01-01 RX ADMIN — IPRATROPIUM BROMIDE AND ALBUTEROL SULFATE 3 ML: 2.5; .5 SOLUTION RESPIRATORY (INHALATION) at 12:30

## 2021-01-01 RX ADMIN — GUAIFENESIN 1200 MG: 600 TABLET, EXTENDED RELEASE ORAL at 08:21

## 2021-01-01 RX ADMIN — HEPARIN 500 UNITS: 100 SYRINGE at 12:25

## 2021-01-01 RX ADMIN — BUDESONIDE 0.5 MG: 0.5 INHALANT RESPIRATORY (INHALATION) at 19:37

## 2021-01-01 RX ADMIN — MICAFUNGIN SODIUM 150 MG: 100 INJECTION, POWDER, LYOPHILIZED, FOR SOLUTION INTRAVENOUS at 17:04

## 2021-01-01 RX ADMIN — ENOXAPARIN SODIUM 70 MG: 80 INJECTION SUBCUTANEOUS at 21:39

## 2021-01-01 RX ADMIN — PREGABALIN 100 MG: 100 CAPSULE ORAL at 16:01

## 2021-01-01 RX ADMIN — CALCIUM CARBONATE-VITAMIN D TAB 500 MG-200 UNIT 1 TABLET: 500-200 TAB at 08:54

## 2021-01-01 RX ADMIN — PREGABALIN 100 MG: 100 CAPSULE ORAL at 21:24

## 2021-01-01 RX ADMIN — REMDESIVIR 100 MG: 100 INJECTION, POWDER, LYOPHILIZED, FOR SOLUTION INTRAVENOUS at 20:30

## 2021-01-01 RX ADMIN — PREGABALIN 100 MG: 100 CAPSULE ORAL at 09:02

## 2021-01-01 RX ADMIN — POTASSIUM CHLORIDE 40 MEQ: 1500 TABLET, EXTENDED RELEASE ORAL at 03:54

## 2021-01-01 RX ADMIN — SERTRALINE HYDROCHLORIDE 50 MG: 50 TABLET ORAL at 20:51

## 2021-01-01 RX ADMIN — SERTRALINE 50 MG: 50 TABLET, FILM COATED ORAL at 20:57

## 2021-01-01 RX ADMIN — ALBUTEROL SULFATE 2 PUFF: 108 INHALANT RESPIRATORY (INHALATION) at 19:29

## 2021-01-01 RX ADMIN — TRIAMTERENE AND HYDROCHLOROTHIAZIDE 1 TABLET: 37.5; 25 TABLET ORAL at 08:21

## 2021-01-01 RX ADMIN — SODIUM CHLORIDE 1000 MG: 900 INJECTION, SOLUTION INTRAVENOUS at 17:16

## 2021-01-01 RX ADMIN — DOXYCYCLINE 100 MG: 100 INJECTION, POWDER, LYOPHILIZED, FOR SOLUTION INTRAVENOUS at 21:46

## 2021-01-01 RX ADMIN — Medication 500 UNITS: at 16:15

## 2021-01-01 RX ADMIN — MAGNESIUM GLUCONATE 500 MG ORAL TABLET 400 MG: 500 TABLET ORAL at 08:58

## 2021-01-01 RX ADMIN — GUAIFENESIN AND DEXTROMETHORPHAN 10 ML: 100; 10 SYRUP ORAL at 20:54

## 2021-01-01 RX ADMIN — BUDESONIDE 0.5 MG: 0.5 INHALANT RESPIRATORY (INHALATION) at 08:11

## 2021-01-01 RX ADMIN — PREGABALIN 100 MG: 100 CAPSULE ORAL at 15:29

## 2021-01-01 RX ADMIN — PREGABALIN 100 MG: 100 CAPSULE ORAL at 16:47

## 2021-01-01 RX ADMIN — TRIAMTERENE AND HYDROCHLOROTHIAZIDE 1 TABLET: 37.5; 25 TABLET ORAL at 08:55

## 2021-01-01 RX ADMIN — LETROZOLE 2.5 MG: 2.5 TABLET ORAL at 08:54

## 2021-01-01 RX ADMIN — LETROZOLE 2.5 MG: 2.5 TABLET ORAL at 10:00

## 2021-01-01 RX ADMIN — Medication 10 ML: at 10:09

## 2021-01-01 RX ADMIN — ASPIRIN 81 MG CHEWABLE TABLET 324 MG: 81 TABLET CHEWABLE at 01:58

## 2021-01-01 RX ADMIN — POTASSIUM CHLORIDE 20 MEQ: 1500 TABLET, EXTENDED RELEASE ORAL at 17:17

## 2021-01-01 RX ADMIN — IPRATROPIUM BROMIDE AND ALBUTEROL SULFATE 3 ML: 2.5; .5 SOLUTION RESPIRATORY (INHALATION) at 10:30

## 2021-01-01 RX ADMIN — HEPARIN 500 UNITS: 100 SYRINGE at 15:52

## 2021-01-01 RX ADMIN — ATORVASTATIN CALCIUM 20 MG: 20 TABLET, FILM COATED ORAL at 20:44

## 2021-01-01 RX ADMIN — PANTOPRAZOLE SODIUM 40 MG: 40 TABLET, DELAYED RELEASE ORAL at 06:04

## 2021-01-01 RX ADMIN — PREGABALIN 100 MG: 100 CAPSULE ORAL at 08:55

## 2021-01-01 RX ADMIN — ENOXAPARIN SODIUM 70 MG: 80 INJECTION, SOLUTION INTRAVENOUS; SUBCUTANEOUS at 09:02

## 2021-01-01 RX ADMIN — MAGNESIUM SULFATE IN DEXTROSE 1 G: 10 INJECTION, SOLUTION INTRAVENOUS at 10:39

## 2021-01-01 RX ADMIN — PREGABALIN 100 MG: 100 CAPSULE ORAL at 15:16

## 2021-01-01 RX ADMIN — PREGABALIN 100 MG: 100 CAPSULE ORAL at 20:44

## 2021-01-01 RX ADMIN — Medication 10 ML: at 09:59

## 2021-01-01 RX ADMIN — SODIUM CHLORIDE, PRESERVATIVE FREE 3 ML: 5 INJECTION INTRAVENOUS at 21:25

## 2021-01-01 RX ADMIN — WATER 1 G: 100 INJECTION, SOLUTION INTRAVENOUS at 21:48

## 2021-01-01 RX ADMIN — METHYLPREDNISOLONE SODIUM SUCCINATE 40 MG: 40 INJECTION, POWDER, FOR SOLUTION INTRAMUSCULAR; INTRAVENOUS at 16:01

## 2021-01-01 RX ADMIN — IPRATROPIUM BROMIDE AND ALBUTEROL SULFATE 3 ML: 2.5; .5 SOLUTION RESPIRATORY (INHALATION) at 04:36

## 2021-01-01 RX ADMIN — ENOXAPARIN SODIUM 70 MG: 80 INJECTION SUBCUTANEOUS at 09:58

## 2021-01-01 RX ADMIN — SODIUM PHOSPHATE, DIBASIC, ANHYDROUS, POTASSIUM PHOSPHATE, MONOBASIC, AND SODIUM PHOSPHATE, MONOBASIC, MONOHYDRATE 1 TABLET: 852; 155; 130 TABLET, COATED ORAL at 20:57

## 2021-01-01 RX ADMIN — POTASSIUM CHLORIDE 20 MEQ: 1500 TABLET, EXTENDED RELEASE ORAL at 08:55

## 2021-01-01 RX ADMIN — Medication 2 TABLET: at 20:44

## 2021-01-01 RX ADMIN — BUDESONIDE 0.5 MG: 0.5 INHALANT RESPIRATORY (INHALATION) at 19:12

## 2021-01-01 RX ADMIN — CETIRIZINE HYDROCHLORIDE 10 MG: 10 TABLET, FILM COATED ORAL at 21:23

## 2021-01-01 RX ADMIN — SODIUM CHLORIDE, PRESERVATIVE FREE 3 ML: 5 INJECTION INTRAVENOUS at 08:56

## 2021-01-01 RX ADMIN — MONTELUKAST 10 MG: 10 TABLET, FILM COATED ORAL at 20:28

## 2021-01-01 RX ADMIN — IPRATROPIUM BROMIDE AND ALBUTEROL SULFATE 3 ML: 2.5; .5 SOLUTION RESPIRATORY (INHALATION) at 19:16

## 2021-01-01 RX ADMIN — ERYTHROMYCIN: 5 OINTMENT OPHTHALMIC at 15:42

## 2021-01-01 RX ADMIN — ERYTHROMYCIN: 5 OINTMENT OPHTHALMIC at 17:06

## 2021-01-01 RX ADMIN — CALCIUM CARBONATE-VITAMIN D TAB 500 MG-200 UNIT 1 TABLET: 500-200 TAB at 08:20

## 2021-01-01 RX ADMIN — FOLIC ACID 1000 MCG: 1 TABLET ORAL at 08:21

## 2021-01-01 RX ADMIN — Medication 10 ML: at 09:12

## 2021-01-01 RX ADMIN — WATER 1 G: 100 INJECTION, SOLUTION INTRAVENOUS at 20:30

## 2021-01-01 RX ADMIN — PROPOFOL 10 MG: 10 INJECTION, EMULSION INTRAVENOUS at 11:39

## 2021-01-01 RX ADMIN — TRIAMTERENE AND HYDROCHLOROTHIAZIDE 1 TABLET: 37.5; 25 TABLET ORAL at 09:00

## 2021-01-01 RX ADMIN — ERYTHROMYCIN: 5 OINTMENT OPHTHALMIC at 18:39

## 2021-01-01 RX ADMIN — PREGABALIN 100 MG: 100 CAPSULE ORAL at 08:20

## 2021-01-01 RX ADMIN — POTASSIUM CHLORIDE 20 MEQ: 1500 TABLET, EXTENDED RELEASE ORAL at 18:39

## 2021-01-01 RX ADMIN — DEXAMETHASONE 6 MG: 6 TABLET ORAL at 08:30

## 2021-01-01 RX ADMIN — DEXAMETHASONE SODIUM PHOSPHATE 6 MG: 4 INJECTION, SOLUTION INTRAMUSCULAR; INTRAVENOUS at 09:11

## 2021-01-01 RX ADMIN — ERYTHROMYCIN: 5 OINTMENT OPHTHALMIC at 21:35

## 2021-01-01 RX ADMIN — SERTRALINE HYDROCHLORIDE 50 MG: 50 TABLET ORAL at 20:44

## 2021-01-01 RX ADMIN — SODIUM PHOSPHATE, DIBASIC, ANHYDROUS, POTASSIUM PHOSPHATE, MONOBASIC, AND SODIUM PHOSPHATE, MONOBASIC, MONOHYDRATE 1 TABLET: 852; 155; 130 TABLET, COATED ORAL at 16:15

## 2021-01-01 RX ADMIN — GEMCITABINE 1150 MG: 38 INJECTION, SOLUTION INTRAVENOUS at 10:49

## 2021-01-01 RX ADMIN — POTASSIUM CHLORIDE 40 MEQ: 1500 TABLET, EXTENDED RELEASE ORAL at 09:59

## 2021-01-01 RX ADMIN — MONTELUKAST 10 MG: 10 TABLET, FILM COATED ORAL at 21:34

## 2021-01-01 RX ADMIN — SODIUM CHLORIDE 250 ML: 9 INJECTION, SOLUTION INTRAVENOUS at 11:57

## 2021-01-01 RX ADMIN — IPRATROPIUM BROMIDE AND ALBUTEROL SULFATE 3 ML: 2.5; .5 SOLUTION RESPIRATORY (INHALATION) at 07:18

## 2021-01-01 RX ADMIN — Medication 400 MG: at 21:35

## 2021-01-01 RX ADMIN — BUDESONIDE 0.5 MG: 0.5 INHALANT RESPIRATORY (INHALATION) at 07:42

## 2021-01-01 RX ADMIN — Medication 2 TABLET: at 08:58

## 2021-01-01 RX ADMIN — CALCIUM CARBONATE-VITAMIN D TAB 500 MG-200 UNIT 1 TABLET: 500-200 TAB at 10:00

## 2021-01-01 RX ADMIN — DOCUSATE SODIUM 50 MG AND SENNOSIDES 8.6 MG 2 TABLET: 8.6; 5 TABLET, FILM COATED ORAL at 21:34

## 2021-01-01 RX ADMIN — SERTRALINE HYDROCHLORIDE 50 MG: 50 TABLET ORAL at 00:52

## 2021-01-01 RX ADMIN — MAGNESIUM SULFATE HEPTAHYDRATE 4 G: 4 INJECTION, SOLUTION INTRAVENOUS at 18:30

## 2021-01-01 RX ADMIN — ZINC SULFATE 220 MG (50 MG) CAPSULE 440 MG: CAPSULE at 08:30

## 2021-01-01 RX ADMIN — HEPARIN 500 UNITS: 100 SYRINGE at 13:19

## 2021-01-01 RX ADMIN — Medication 30 ML: at 15:23

## 2021-01-01 RX ADMIN — CALCIUM CARBONATE-VITAMIN D TAB 500 MG-200 UNIT 1 TABLET: 500-200 TAB at 08:12

## 2021-01-01 RX ADMIN — CETIRIZINE HYDROCHLORIDE 10 MG: 10 TABLET, FILM COATED ORAL at 20:28

## 2021-01-01 RX ADMIN — VINORELBINE 45 MG: 10 INJECTION, SOLUTION INTRAVENOUS at 10:18

## 2021-01-01 RX ADMIN — IPRATROPIUM BROMIDE AND ALBUTEROL SULFATE 3 ML: 2.5; .5 SOLUTION RESPIRATORY (INHALATION) at 22:34

## 2021-01-01 RX ADMIN — PREGABALIN 100 MG: 100 CAPSULE ORAL at 20:51

## 2021-01-01 RX ADMIN — ENOXAPARIN SODIUM 70 MG: 80 INJECTION, SOLUTION INTRAVENOUS; SUBCUTANEOUS at 11:57

## 2021-01-01 RX ADMIN — TRIAMTERENE AND HYDROCHLOROTHIAZIDE 1 TABLET: 37.5; 25 TABLET ORAL at 09:59

## 2021-01-01 RX ADMIN — SODIUM CHLORIDE 100 ML: 9 INJECTION, SOLUTION INTRAVENOUS at 09:54

## 2021-01-01 RX ADMIN — PREGABALIN 100 MG: 100 CAPSULE ORAL at 16:15

## 2021-01-01 RX ADMIN — LETROZOLE 2.5 MG: 2.5 TABLET ORAL at 14:39

## 2021-01-01 RX ADMIN — CALCIUM GLUCONATE 1 G: 20 INJECTION, SOLUTION INTRAVENOUS at 15:42

## 2021-01-01 RX ADMIN — SERTRALINE HYDROCHLORIDE 50 MG: 50 TABLET ORAL at 20:28

## 2021-01-01 RX ADMIN — POTASSIUM CHLORIDE 20 MEQ: 1500 TABLET, EXTENDED RELEASE ORAL at 08:21

## 2021-01-01 RX ADMIN — WARFARIN 5 MG: 5 TABLET ORAL at 17:16

## 2021-01-01 RX ADMIN — SERTRALINE HYDROCHLORIDE 50 MG: 50 TABLET ORAL at 21:34

## 2021-01-01 RX ADMIN — DEXAMETHASONE SODIUM PHOSPHATE 12 MG: 4 INJECTION, SOLUTION INTRA-ARTICULAR; INTRALESIONAL; INTRAMUSCULAR; INTRAVENOUS; SOFT TISSUE at 11:58

## 2021-01-01 RX ADMIN — ERYTHROMYCIN: 5 OINTMENT OPHTHALMIC at 12:49

## 2021-01-01 RX ADMIN — MAGNESIUM SULFATE HEPTAHYDRATE 2 G: 2 INJECTION, SOLUTION INTRAVENOUS at 04:17

## 2021-01-01 RX ADMIN — LETROZOLE 2.5 MG: 2.5 TABLET ORAL at 08:20

## 2021-01-01 RX ADMIN — Medication 2 TABLET: at 09:00

## 2021-01-01 RX ADMIN — PREGABALIN 100 MG: 100 CAPSULE ORAL at 21:34

## 2021-01-01 RX ADMIN — MICAFUNGIN SODIUM 150 MG: 100 INJECTION, POWDER, LYOPHILIZED, FOR SOLUTION INTRAVENOUS at 17:36

## 2021-01-01 RX ADMIN — PANTOPRAZOLE SODIUM 40 MG: 40 TABLET, DELAYED RELEASE ORAL at 16:01

## 2021-01-01 RX ADMIN — CALCIUM CARBONATE-VITAMIN D TAB 500 MG-200 UNIT 1 TABLET: 500-200 TAB at 08:50

## 2021-01-01 RX ADMIN — SODIUM CHLORIDE 250 ML: 9 INJECTION, SOLUTION INTRAVENOUS at 14:47

## 2021-01-01 RX ADMIN — SODIUM PHOSPHATE, DIBASIC, ANHYDROUS, POTASSIUM PHOSPHATE, MONOBASIC, AND SODIUM PHOSPHATE, MONOBASIC, MONOHYDRATE 1 TABLET: 852; 155; 130 TABLET, COATED ORAL at 21:38

## 2021-01-01 RX ADMIN — POTASSIUM CHLORIDE 20 MEQ: 1500 TABLET, EXTENDED RELEASE ORAL at 09:59

## 2021-01-01 RX ADMIN — ENOXAPARIN SODIUM 70 MG: 80 INJECTION SUBCUTANEOUS at 21:35

## 2021-01-01 RX ADMIN — PALONOSETRON 0.25 MG: 0.25 INJECTION, SOLUTION INTRAVENOUS at 09:52

## 2021-01-01 RX ADMIN — REMDESIVIR 100 MG: 100 INJECTION, POWDER, LYOPHILIZED, FOR SOLUTION INTRAVENOUS at 21:33

## 2021-01-01 RX ADMIN — CALCIUM CARBONATE-VITAMIN D TAB 500 MG-200 UNIT 1 TABLET: 500-200 TAB at 21:23

## 2021-01-01 RX ADMIN — GUAIFENESIN 1200 MG: 600 TABLET, EXTENDED RELEASE ORAL at 21:35

## 2021-01-01 RX ADMIN — CALCIUM CARBONATE-VITAMIN D TAB 500 MG-200 UNIT 1 TABLET: 500-200 TAB at 21:34

## 2021-01-01 RX ADMIN — Medication 400 MG: at 16:14

## 2021-01-01 RX ADMIN — TRIAMTERENE AND HYDROCHLOROTHIAZIDE 1 TABLET: 37.5; 25 TABLET ORAL at 08:12

## 2021-01-01 RX ADMIN — WATER 1 G: 100 INJECTION, SOLUTION INTRAVENOUS at 20:50

## 2021-01-01 RX ADMIN — SODIUM CHLORIDE 500 ML: 9 INJECTION, SOLUTION INTRAVENOUS at 11:57

## 2021-01-01 RX ADMIN — PREGABALIN 100 MG: 100 CAPSULE ORAL at 08:30

## 2021-01-01 RX ADMIN — ERYTHROMYCIN: 5 OINTMENT OPHTHALMIC at 21:25

## 2021-01-01 RX ADMIN — ENOXAPARIN SODIUM 70 MG: 80 INJECTION SUBCUTANEOUS at 08:53

## 2021-01-01 RX ADMIN — ALBUTEROL SULFATE 2 PUFF: 108 INHALANT RESPIRATORY (INHALATION) at 08:29

## 2021-01-01 RX ADMIN — SODIUM CHLORIDE 500 ML: 9 INJECTION, SOLUTION INTRAVENOUS at 20:23

## 2021-01-01 RX ADMIN — Medication 20 ML: at 13:23

## 2021-01-01 RX ADMIN — THEOPHYLLINE ANHYDROUS 300 MG: 300 CAPSULE, EXTENDED RELEASE ORAL at 08:50

## 2021-01-01 RX ADMIN — Medication 400 MG: at 16:09

## 2021-01-01 RX ADMIN — Medication 2 TABLET: at 20:16

## 2021-01-01 RX ADMIN — ALBUTEROL SULFATE 2 PUFF: 108 INHALANT RESPIRATORY (INHALATION) at 14:35

## 2021-01-01 RX ADMIN — MAGNESIUM OXIDE TAB 400 MG (241.3 MG ELEMENTAL MG) 400 MG: 400 (241.3 MG) TAB at 21:23

## 2021-01-01 RX ADMIN — ZINC SULFATE 220 MG (50 MG) CAPSULE 440 MG: CAPSULE at 09:59

## 2021-01-01 RX ADMIN — PROPOFOL 20 MG: 10 INJECTION, EMULSION INTRAVENOUS at 11:37

## 2021-01-01 RX ADMIN — SODIUM PHOSPHATE, DIBASIC, ANHYDROUS, POTASSIUM PHOSPHATE, MONOBASIC, AND SODIUM PHOSPHATE, MONOBASIC, MONOHYDRATE 1 TABLET: 852; 155; 130 TABLET, COATED ORAL at 08:21

## 2021-01-01 RX ADMIN — POTASSIUM CHLORIDE 40 MEQ: 1500 TABLET, EXTENDED RELEASE ORAL at 09:02

## 2021-01-01 RX ADMIN — IPRATROPIUM BROMIDE AND ALBUTEROL SULFATE 3 ML: 2.5; .5 SOLUTION RESPIRATORY (INHALATION) at 15:18

## 2021-01-01 RX ADMIN — ONDANSETRON 4 MG: 2 INJECTION INTRAMUSCULAR; INTRAVENOUS at 10:27

## 2021-01-01 RX ADMIN — CALCIUM CARBONATE-VITAMIN D TAB 500 MG-200 UNIT 1 TABLET: 500-200 TAB at 14:38

## 2021-01-01 RX ADMIN — ENOXAPARIN SODIUM 70 MG: 80 INJECTION, SOLUTION INTRAVENOUS; SUBCUTANEOUS at 23:15

## 2021-01-01 RX ADMIN — PREGABALIN 100 MG: 100 CAPSULE ORAL at 08:49

## 2021-01-01 RX ADMIN — SODIUM CHLORIDE 250 ML: 9 INJECTION, SOLUTION INTRAVENOUS at 10:30

## 2021-01-01 RX ADMIN — REMDESIVIR 100 MG: 100 INJECTION, POWDER, LYOPHILIZED, FOR SOLUTION INTRAVENOUS at 23:16

## 2021-01-01 RX ADMIN — Medication 400 MG: at 21:25

## 2021-01-01 RX ADMIN — Medication 10 ML: at 20:50

## 2021-01-01 RX ADMIN — POTASSIUM CHLORIDE 500 ML: 2 INJECTION, SOLUTION, CONCENTRATE INTRAVENOUS at 13:37

## 2021-01-01 RX ADMIN — PROPOFOL 20 MG: 10 INJECTION, EMULSION INTRAVENOUS at 12:54

## 2021-01-01 RX ADMIN — DEXAMETHASONE SODIUM PHOSPHATE 12 MG: 4 INJECTION, SOLUTION INTRA-ARTICULAR; INTRALESIONAL; INTRAMUSCULAR; INTRAVENOUS; SOFT TISSUE at 11:26

## 2021-01-01 RX ADMIN — Medication 10 ML: at 12:59

## 2021-01-01 RX ADMIN — MONTELUKAST 10 MG: 10 TABLET, FILM COATED ORAL at 00:52

## 2021-01-01 RX ADMIN — ERYTHROMYCIN: 5 OINTMENT OPHTHALMIC at 16:08

## 2021-01-01 RX ADMIN — Medication 10 ML: at 12:24

## 2021-01-01 RX ADMIN — DOCUSATE SODIUM 50 MG AND SENNOSIDES 8.6 MG 2 TABLET: 8.6; 5 TABLET, FILM COATED ORAL at 08:54

## 2021-01-01 RX ADMIN — TRIAMTERENE AND HYDROCHLOROTHIAZIDE 1 TABLET: 37.5; 25 TABLET ORAL at 17:16

## 2021-01-01 RX ADMIN — PREGABALIN 100 MG: 100 CAPSULE ORAL at 22:05

## 2021-01-01 RX ADMIN — SERTRALINE 50 MG: 50 TABLET, FILM COATED ORAL at 21:39

## 2021-01-01 RX ADMIN — DEXAMETHASONE 4 MG: 4 TABLET ORAL at 00:52

## 2021-01-01 RX ADMIN — PREGABALIN 100 MG: 100 CAPSULE ORAL at 21:39

## 2021-01-01 RX ADMIN — Medication 10 ML: at 09:03

## 2021-01-01 RX ADMIN — Medication 500 UNITS: at 19:57

## 2021-01-01 RX ADMIN — ONDANSETRON HYDROCHLORIDE 4 MG: 4 TABLET, FILM COATED ORAL at 21:39

## 2021-01-01 RX ADMIN — METHYLPREDNISOLONE SODIUM SUCCINATE 40 MG: 40 INJECTION, POWDER, FOR SOLUTION INTRAMUSCULAR; INTRAVENOUS at 08:12

## 2021-01-01 RX ADMIN — FAMOTIDINE 40 MG: 20 TABLET, FILM COATED ORAL at 09:11

## 2021-01-01 RX ADMIN — DOCUSATE SODIUM 50 MG AND SENNOSIDES 8.6 MG 2 TABLET: 8.6; 5 TABLET, FILM COATED ORAL at 09:59

## 2021-01-01 RX ADMIN — ERYTHROMYCIN: 5 OINTMENT OPHTHALMIC at 08:21

## 2021-01-01 RX ADMIN — Medication 10 ML: at 08:50

## 2021-01-01 RX ADMIN — ALBUTEROL SULFATE 2 PUFF: 108 INHALANT RESPIRATORY (INHALATION) at 20:01

## 2021-01-01 RX ADMIN — CYANOCOBALAMIN 1000 MCG: 1000 INJECTION, SOLUTION INTRAMUSCULAR at 10:22

## 2021-01-01 RX ADMIN — METHYLPREDNISOLONE SODIUM SUCCINATE 125 MG: 125 INJECTION, POWDER, FOR SOLUTION INTRAMUSCULAR; INTRAVENOUS at 01:53

## 2021-01-01 RX ADMIN — PANTOPRAZOLE SODIUM 40 MG: 40 TABLET, DELAYED RELEASE ORAL at 08:59

## 2021-01-01 RX ADMIN — Medication 10 ML: at 20:16

## 2021-01-01 RX ADMIN — OXYCODONE 20 MG: 5 TABLET ORAL at 16:15

## 2021-01-01 RX ADMIN — WARFARIN 5 MG: 5 TABLET ORAL at 16:01

## 2021-01-01 RX ADMIN — MONTELUKAST 10 MG: 10 TABLET, FILM COATED ORAL at 20:51

## 2021-01-01 RX ADMIN — Medication 10 ML: at 08:58

## 2021-01-01 RX ADMIN — Medication 10 ML: at 09:42

## 2021-01-01 RX ADMIN — CETIRIZINE HYDROCHLORIDE 10 MG: 10 TABLET, FILM COATED ORAL at 00:52

## 2021-01-01 RX ADMIN — Medication 5000 UNITS: at 09:58

## 2021-01-01 RX ADMIN — MAGNESIUM OXIDE TAB 400 MG (241.3 MG ELEMENTAL MG) 400 MG: 400 (241.3 MG) TAB at 00:52

## 2021-01-01 RX ADMIN — LIDOCAINE HYDROCHLORIDE 40 MG: 10 INJECTION, SOLUTION EPIDURAL; INFILTRATION; INTRACAUDAL; PERINEURAL at 12:49

## 2021-01-01 RX ADMIN — GEMCITABINE 1150 MG: 38 INJECTION, SOLUTION INTRAVENOUS at 11:48

## 2021-01-01 RX ADMIN — HEPARIN 500 UNITS: 100 SYRINGE at 12:15

## 2021-01-01 RX ADMIN — FAMOTIDINE 40 MG: 20 TABLET, FILM COATED ORAL at 09:00

## 2021-01-01 RX ADMIN — CEFTRIAXONE SODIUM 1 G: 1 INJECTION, POWDER, FOR SOLUTION INTRAMUSCULAR; INTRAVENOUS at 20:10

## 2021-01-01 RX ADMIN — ZINC SULFATE 220 MG (50 MG) CAPSULE 440 MG: CAPSULE at 09:00

## 2021-01-04 ENCOUNTER — TELEPHONE (OUTPATIENT)
Dept: RADIATION ONCOLOGY | Facility: HOSPITAL | Age: 66
End: 2021-01-04

## 2021-01-05 ENCOUNTER — TELEPHONE (OUTPATIENT)
Dept: RADIATION ONCOLOGY | Facility: HOSPITAL | Age: 66
End: 2021-01-05

## 2021-01-06 ENCOUNTER — HOSPITAL ENCOUNTER (OUTPATIENT)
Dept: ONCOLOGY | Facility: HOSPITAL | Age: 66
Setting detail: INFUSION SERIES
Discharge: HOME | End: 2021-01-06

## 2021-01-06 ENCOUNTER — LAB (OUTPATIENT)
Dept: LAB | Facility: HOSPITAL | Age: 66
End: 2021-01-06

## 2021-01-06 DIAGNOSIS — I82.722 CHRONIC DEEP VEIN THROMBOSIS (DVT) OF LEFT UPPER EXTREMITY, UNSPECIFIED VEIN (HCC): Primary | ICD-10-CM

## 2021-01-06 DIAGNOSIS — Z79.01 LONG TERM (CURRENT) USE OF ANTICOAGULANTS: Primary | ICD-10-CM

## 2021-01-06 LAB — INR PPP: 4.2

## 2021-01-06 PROCEDURE — 85610 PROTHROMBIN TIME: CPT

## 2021-01-06 PROCEDURE — 36416 COLLJ CAPILLARY BLOOD SPEC: CPT

## 2021-01-07 ENCOUNTER — TELEPHONE (OUTPATIENT)
Dept: RADIATION ONCOLOGY | Facility: HOSPITAL | Age: 66
End: 2021-01-07

## 2021-01-08 ENCOUNTER — HOSPITAL ENCOUNTER (OUTPATIENT)
Dept: ONCOLOGY | Facility: HOSPITAL | Age: 66
Setting detail: INFUSION SERIES
Discharge: HOME | End: 2021-01-08

## 2021-01-08 ENCOUNTER — LAB (OUTPATIENT)
Dept: LAB | Facility: HOSPITAL | Age: 66
End: 2021-01-08

## 2021-01-08 DIAGNOSIS — Z79.01 LONG TERM (CURRENT) USE OF ANTICOAGULANTS: Primary | ICD-10-CM

## 2021-01-08 DIAGNOSIS — I82.722 CHRONIC DEEP VEIN THROMBOSIS (DVT) OF LEFT UPPER EXTREMITY, UNSPECIFIED VEIN (HCC): Primary | ICD-10-CM

## 2021-01-08 LAB — INR PPP: 4.4

## 2021-01-08 PROCEDURE — 36416 COLLJ CAPILLARY BLOOD SPEC: CPT

## 2021-01-08 PROCEDURE — 85610 PROTHROMBIN TIME: CPT

## 2021-01-08 RX ORDER — WARFARIN SODIUM 5 MG/1
5 TABLET ORAL
Qty: 30 TABLET | Refills: 0 | Status: SHIPPED | OUTPATIENT
Start: 2021-01-08 | End: 2021-01-29 | Stop reason: SDUPTHER

## 2021-01-12 ENCOUNTER — OFFICE VISIT (OUTPATIENT)
Dept: PAIN MEDICINE | Facility: CLINIC | Age: 66
End: 2021-01-12

## 2021-01-12 VITALS
HEIGHT: 65 IN | RESPIRATION RATE: 16 BRPM | HEART RATE: 86 BPM | OXYGEN SATURATION: 96 % | WEIGHT: 180 LBS | BODY MASS INDEX: 29.99 KG/M2 | DIASTOLIC BLOOD PRESSURE: 62 MMHG | TEMPERATURE: 97.7 F | SYSTOLIC BLOOD PRESSURE: 107 MMHG

## 2021-01-12 DIAGNOSIS — M79.605 LEG PAIN, BILATERAL: ICD-10-CM

## 2021-01-12 DIAGNOSIS — M79.7 FIBROMYOSITIS: ICD-10-CM

## 2021-01-12 DIAGNOSIS — M54.2 NECK PAIN: ICD-10-CM

## 2021-01-12 DIAGNOSIS — M79.7 FIBROMYALGIA: ICD-10-CM

## 2021-01-12 DIAGNOSIS — R52 PAIN: Primary | ICD-10-CM

## 2021-01-12 DIAGNOSIS — M50.10 CERVICAL DISC DISORDER WITH RADICULOPATHY: ICD-10-CM

## 2021-01-12 DIAGNOSIS — Z79.899 OTHER LONG TERM (CURRENT) DRUG THERAPY: ICD-10-CM

## 2021-01-12 DIAGNOSIS — M79.604 LEG PAIN, BILATERAL: ICD-10-CM

## 2021-01-12 PROCEDURE — 99213 OFFICE O/P EST LOW 20 MIN: CPT | Performed by: PHYSICAL MEDICINE & REHABILITATION

## 2021-01-12 RX ORDER — PREGABALIN 100 MG/1
100 CAPSULE ORAL 3 TIMES DAILY
Qty: 90 CAPSULE | Refills: 2 | Status: SHIPPED | OUTPATIENT
Start: 2021-01-12 | End: 2021-01-01 | Stop reason: SDUPTHER

## 2021-01-12 RX ORDER — OXYCODONE HYDROCHLORIDE 10 MG/1
10 TABLET ORAL 3 TIMES DAILY PRN
Qty: 90 TABLET | Refills: 0 | Status: SHIPPED | OUTPATIENT
Start: 2021-01-12 | End: 2021-01-13 | Stop reason: SDUPTHER

## 2021-01-12 RX ORDER — OXYCODONE HYDROCHLORIDE 10 MG/1
10 TABLET ORAL 3 TIMES DAILY PRN
Qty: 90 TABLET | Refills: 0 | Status: SHIPPED | OUTPATIENT
Start: 2021-01-12 | End: 2021-01-25 | Stop reason: SDUPTHER

## 2021-01-12 NOTE — PROGRESS NOTES
Hematology/Oncology Outpatient Follow Up    PATIENT NAME:Tahira Zimmerman  :1955  MRN: [id number]  PRIMARY CARE PHYSICIAN: Noemy Queen MD  REFERRING PHYSICIAN: Noemy Queen MD    Chief Complaint   Patient presents with   • Follow-up     Malignant neoplasm of right ovary      HISTORY OF PRESENT ILLNESS:     1. Recurrent ovarian cancer diagnosis established in 2013.  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  The patient was treated with surgery and then postoperatively with adjuvant chemotherapy consisting of carboplatin and Taxol.  Six months later, she had recurrence of disease intra-abdominally between the rectum and vagina, which was treated with intraabdominal peritoneal chemotherapy.  She had been free of disease since that time.  She reported feeling a mass in the left side of her abdomen approximately six months ago.  This gradually grew and in early 2013 she had her daughter feel the mass.  The daughter works for Dr. David Rogers and the patient at that time also complained of intermittent rectal bleeding.  Consultation with Dr. David Rogers was obtained.  The patient had a CT scan of the abdomen and pelvis performed on 13 revealing left lower quadrant mass measuring 9.7 x 9.3 x 6 cm demonstrating areas of dense calcification, soft tissue density and cystic density within it.  Stromal tumor is felt to be most likely a possibly fibroma.  It is generated with necrosis and calcification.  Possible palpable mass in the rectus muscle is seen with calcification and deformity of the rectus muscle and just below this a second mass intra-abdominally was seen measuring 2 cm in the greatest diameter suspicious for second intraabdominal tumor.  The mass separate from the largest mass measuring 2.6 cm in the dependent portion of pelvis is seen on the right of the midline.  No  retroperitoneal lymphadenopathy was seen.  No free air, free fluid or other abnormality was present.  The patient was scheduled for an outpatient colonoscopy, but had syncopal episode while sitting in the toilet the night before and was brought to the emergency room early in the morning by her daughter and son-in-law.  The patient had apparently stopped breathing for a few seconds and did not have a pulse, but started breathing before CPR could be initiated.  In the emergency room, the patient had an EKG revealing sinus rhythm nonspecific T-wave flattening; metabolic panel revealed a glucose of 148, creatinine of 1.3, CBC was normal.  She was treated with intravenous fluid.  The patient’s case was then discussed with Dr. Anabell Monique and patient was subsequently admitted to [facility].  The patient underwent a colonoscopy by Dr. David Rogers on 06/27/13 revealing sigmoid diverticulosis and grade II internal and external hemorrhoids, but no mass or colitis was seen.  CT-guided biopsy of the mass was performed on 06/27/13 as well revealing metastatic papillary adenocarcinoma with numerous psammoma bodies.  Pathology comment stated prior records were not available; however, with history of ovarian cancer the current biopsy would be consistent with metastases from that malignancy.  Oncology consult was obtained and I initially saw the patient on 06/27/2013 where the patient had claimed to have little bit of pain in the area of disease.  She reported being frequently constipated, denies any weight loss or fevers.  She did report having some night sweats, but thought that was because of her menopause.  On 06/27/13 metastatic workup including labs and CT scans were initiated.  CT scan of the chest on 06/27/13 revealed no acute disease in the chest.  A 1.4 cm size focal area of decreased density was noted in the lateral aspect of the right lobe of the liver consistent with a benign cyst or hemangioma.   There was a very small hiatal hernia measuring 2.2 cm in diameter.  A CT scan of the head performed 06/27/13 revealed no acute intracranial abnormality noted.  CA-125 was 40 units/ml (0-35), CA 19-9 was 11.8 units/ml (0-35), CEA level was 0.8 ng/ml (0-3), TSH level was 1.09 IU/ml (0.34-5.6).  The patient was in workup for the syncopal episode, a carotid Doppler was performed on June 28 revealing an essentially normal study showing a reduction in diameter from 0-15% bilaterally.  A Holter monitor was completed on 06/27/13, which was unremarkable except for a few premature atrial contractions.  The patient was felt stable and subsequently was discharged home on 06/28/13.  Post discharge, the patient was seen at [facility] on 07/05/13 by Dr. Kathryn Thrasher who discussed treatment options with the patient including surgery.  The patient is in the office today 07/16/13 in follow up post hospitalization.  She is reporting that surgery is planned for July 30th of this month at .  · 7/30/13 to 8/3/13 - The patient was in [facility].  The patient was admitted for surgery for her recurrent ovarian cancer.  The patient had exploratory laparotomy, omentectomy, bowel resection, liver biopsy and appendectomy.  Pathology revealed of the omentum metastatic serous carcinoma of the transverse colon, segmental resection showed metastatic serous carcinoma involving mesenteric fat.  The liver wedge resection showed fibrosclerotic thickening of the hepatic capsule.  Benign liver parenchyma.  No evidence of metastatic tumor.  Appendix showed fibrous obliteration of the lumen.  Negative for metastatic carcinoma.  Rectum and sigmoid colon segmental resection showed metastatic serous carcinoma invading the colorectal mucosa uninvolved by tumor.  Vaginal mass showed metastatic serous carcinoma.  Margins are positive for tumor.  · 9/24/13 - Chemotherapy orders were written for patient to start  carboplatin 550 mg IV day one and Taxol 330 mg IV day one to be cycled every three weeks.  · 10/10/13 - The patient started cycle #1 of chemotherapy consisting of Carboplatin and Taxol.  · 09/25/13 -  is 10.6 normal.  · 10/01/13 - CT of the chest, abdomen and pelvis revealed new reticular nodular occupancy in the anterior segment of the right upper lobe, could reflect an inflammation or infectious etiology or could reflect unusual persistence of metastatic tumor.  New liver lesions, the smaller one appears to be implant on the surface of the liver, the larger may also represent an implant including the intrahepatic.  Several small mesenteric nodes seen throughout the abdomen and pelvis.  · 10/02/13 - Port placed by Dr. Sen.  · 10/24/13 - Orders written to start Neupogen daily and if more than three Neupogen are needed to give Neulasta after next chemo.  ANC is 0.15.  · 10/31/14 - Patient given cycle 2 of chemotherapy with Taxol and Carboplatin.  · 11/21/13 - The patient started cycle 3 of chemotherapy consisting of Carboplatin and Taxol.  · 11/26/13 - CT scan of chest, abdomen and pelvis revealed no evidence of active disease in the chest.  Reticulonodular opacity has resolved.  Metastatic lesion impressing upon the hepatic dome similar to prior exam.  No evidence of a change.  No evidence of new disease.  Postsurgical changes in the pelvis and throughout the retroperitoneum in the large bowel.  Finding containing anterior abdominal wall hernia containing fat tissue and enhancing vessels, 3 cm in diameter.  · 12/2/13 - Orders written to start Neupogen per protocol as well as Aranesp due to low hemoglobin and ANC.  ANC is 0.14.  Hemoglobin is 9.7.  · 12/11/13 - Orders written to hold chemotherapy until next week and decrease dose by 20% due to low platelet count.  Platelet count is 85,000.  · 12/16/13 - The patient received cycle 4 of Carboplatin and Taxol at a 20% dose reduction so Taxol dose is 260 mg and  Carboplatin is 440 mg.  · 12/16/13 - CA-125 12.6 (N).  · 12/19/13 - PET/CT scan.  Impression:  1. There is no evidence of hypermetabolism in the liver.  Specifically of the dominant lesion in or adjacent to the right hepatic dome that was seen and described on the recent CT study of 11/26/2013.  It is photopenic relative to the surrounding liver.  2.  There is no evidence of hypermetabolic soft issue mass lesion or free fluid in the abdomen or pelvis.  3.  The tonsillar tissues are slightly enlarged and have moderate activity level.  This maybe physiologic.  Correlation with oral cavity, examination is recommended.  4.  Mild amount of activity in the right level II jugular lymph chain without focally enlarged lymph nodes is of questionable significance.  · 1/6/13 - The patient received cycle 5 of chemotherapy with Taxol and Carboplatin.  · 1/27/14 - The patient started on cycle 6 of Taxol and Carboplatin.  · 2/18/14 - CT of the abdomen and pelvis with contrast; stable lesions impressing upon the hepatic dome, unchanged compared to the prior exam.  Multiple anterior abdominal wall hernias re-demonstrated.  Those above the umbilicus contain omental fat.  Below and to the left of the umbilicus as anterior abdominal hernia containing omental fat in nondilated small bowel which is larger than seen previously.  · 2/20/14 - The patient received cycle 7 of chemotherapy with Taxol and Carboplatin.   · 3/11/14 - Order written to discontinue chemotherapy due to patient has completed 7 cycles of chemotherapy and CT scans suggest possible remission.  · 3/11/14 -  11.0 (N).  · 06/05/14 - CT scan of the chest abdomen and pelvis with contrast: There are multiple anterior abdominal wall hernias.  There are 2 larger anterior abdominal wall hernias at the level of the transverse colon, which contain omental fat.  There are at least 2 small anterior abdominal wall hernias that contain omental fat.  There is a moderate sized  anterior abdominal wall hernia at the level of the umbilicus, eccentric to the left, which contain non-dilated bowel.  Interval decrease in the size of two deposit impressing on the liver.  The third tiny deposit has been stable.  · 8/19/14 -  10.4 (N).  · 12/19/14 -   10.0 (N)  · 1/7/15 - CT chest, abdomen and pelvis with stable appearance of the chest with several micronodules. Small hiatal hernia, slightly larger than previous exam, increased from 1.5 to 2.5 cm in diameter. Bochdalek hernia unchanged. Multiple hepatic lesions. The two dominant lesions at the right hepatic dome had decreased in size from previous. The remaining tiny nodules measured 3-5 mm in diameter and were unchanged. There was no evidence of new intra-abdominal or pelvic mass or free fluid. There were multiple anterior abdominal wall hernias which had increased in size.   · 7/27/15 - Comprehensive metabolic panel with no significant abnormalities. CA-125 of 21 (0-35).   · 10/26/15 - WBC 6, hemoglobin 13, platelet count 204,000. Heart rate 47. CA-125 of 20 (0-35).    · 2/18/16 - CT chest with contrast with stable micronodular density seen in the lungs without significant change from prior exam. CT abdomen and pelvis with regression of liver lesions with no new evidence of metastasis. Multiple ventral hernias again noted without significant change from prior exam. Creatinine 0.9 (0.6-1.3).    · 2/25/16 - Patient hospitalized at [place] [facility] between 2/25/16 and 2/27/16 with pyelonephritis secondary to E-coli. She was given two days of IV Rocephin and then placed on oral Levaquin. She was asked to hold her Coumadin, but then had nausea and vomiting because of Levaquin and stopped the Levaquin also. Her CA-125 was 32 (0-35) and CEA 1.3 (0-5). Her urine grew >100,000 E-coli. CT of the abdomen and pelvis was done which revealed 4.1 x 1.8 cm sized mild ovoid area of decreased density in the liver parenchyma along the anteromedial  aspect of the dome of the right lobe of the liver, unchanged significantly since the previous study of 2/18/16. There was suspicion of a very small pericardial effusion. There was suspicion of a small hiatal hernia. There were several hernias in the anterior abdominal wall. A 3.5 x 3.1 cm incised well-circumscribed abnormal density in the left retroperitoneal fatty soft tissue at the level of the left iliac crest was suggestive of tumor recurrence. There was an abnormal density in the left retroperitoneal soft tissues in the left flank that partially surrounded the left kidney and extended downward to the left pelvic area. Diverticulosis of the distal descending colon and sigmoid colon were seen.     · 3/8/16 - Patient prescribed Bactrim DS 1 p.o. b.i.d. for 10 days for pyelonephritis, which was partially treated with Rocephin and Levaquin. Urine with 40,000 E-coli treated with Macrobid for 7 days.  of 20 (0-35).    · 3/31/16 - PET scan with area of low density anteriorly in the right lobe of the liver with no significant radiopharmaceutical uptake to suggest malignancy. Multiple round soft tissue density masses showing increased radiopharmaceutical activity and multiple anterior abdominal wall hernias.   · 5/10/16 - Patient complains of venous enlargement of the left chest wall around the port. Has not been seen by  yet, but has an appointment on 5/23/16. Complained of a cough.   · 5/12/16 - Infusaport contrast study with no evidence of fibrin sheath. Chest x-ray with mild cardiac prominence.   · 5/23/16 - Patient seen in consultation by Sheng Bowman M.D. at [facility] and a chemotherapy trial recommended.   · 6/1/16 -   of 27.1 (0-35).    · 6/14/16 - WBC 5.71, hemoglobin 12.4, platelet count 188,000. Patient is scheduled for surgery at  on 6/16/16 after further discussion with  physicians. Discussed adjuvant chemotherapy.   · 6/16/16 - Excisional biopsy of abdominal wall mass  performed by Sheng Bowman M.D. at [facility] with pathology revealing metastatic high-grade papillary serous carcinoma.   · 7/7/16 - Received a call from the patient that Tramadol was not working and that she was given Norco #24 after her biopsy at  which did help her pain. Patient prescribed Norco 5/325 one p.o. q. 4-6 hours p.r.n. #60 with no refills. Echocardiogram with left ventricular inferior wall hypokinesis with ejection fraction of 50-55%.   · 7/8/16 - Patient seen in consultation by Sheng Bowman M.D. in consultation at  for metastatic high-grade papillary serous carcinoma diagnosed by excisional biopsy on 6/16/16 with carcinomatosis and patient not a surgical resection candidate. Genetic sequencing and susceptibility testing of tumor was ordered. Biopsy was done of anterior abdominal wall.   · 7/7/16 - Echocardiogram with left atrial enlargement. Mild mitral regurgitation. Left ventricular proximal inferior wall hypokinesis with ejection fraction of 50-55%.   · 7/11/16 - While waiting for genomics results, in order not to delay treatment further, order written for Taxotere 60 mg/M2 IV over one hour followed by Carboplatin AUC 5 over one hour, cycles to be repeated every three weeks.  Comprehensive metabolic panel normal.  26 (0-35).    · 725/16 - Pain contract signed for use of Norco.   · 8/5/16 - Patient stated that she experienced a red rash and was itchy post taking Norco. Norco discontinued and Tramadol refilled.   · 8/16/16 - Patient started cycle 1 chemotherapy. WBC 7.1, hemoglobin 11.2, MCV 88.7, platelet count 94,000. Patient complained of nausea for a week post chemo. Already getting Emend, Aloxi and Decadron. Added Omeprazole 40 mg daily orally.   · 8/17/16 - Urine culture with >100,000 E-coli.   · 8/25/16 - Cycle 2 chemotherapy given.   · 9/9/16 - Magnesium 1.3, replaced intravenously. Potassium 3.4 (3.6-5.1), increased oral potassium supplementation.    · 9/16/16 -  Cycle 3 chemotherapy given.   · 9/19/16 - Comprehensive metabolic panel with no significant abnormality.   · 9/20/16 - CT abdomen and pelvis with evidence of progressive metastatic disease in the abdomen and pelvis with interval enlargement of several soft tissue attenuations and subcutaneous nodules in the anterior abdominal wall. Interval enlargement of a left pelvic soft tissue attenuation mass. A lentiform area of low attenuation in the dome of the liver stable in size. Multiple anterior abdominal wall hernias containing fat and/or bowel. Marked pelvic floor laxity. CT chest negative for evidence of metastatic disease. Tiny pulmonary nodules in the right lung stable since 2013 consistent with a benign etiology.   · 9/23/16 - Macrobid 100 mg p.o. b.i.d. x7 days prescribed for UTI. Current chemotherapy discontinued after discussion and new chemotherapy orders written. Carboplatin AUC-5 IV day 1 and Doxil (Liposomal Doxorubicin) 30 mg/M2 IV day 1 to cycle q. 21 days.  of 18 (0-35). Magnesium 1.6, replaced intravenously. Comprehensive metabolic panel with potassium 3.4 (3.6-5.1).     · 10/13/16 - Patient started on cycle 1 of Carboplatin and Liposomal Doxorubicin followed by Neulasta.   · 11/3/16 - Cycle 2 Carbo and Doxil given.   · 11/17/16 - Magnesium 1.0, replaced intravenously. Oral potassium supplement increased.   · 11/29/16 - CT chest with small non-calcified right pulmonary nodules unchanged. Benign bone island in the midthoracic vertebral body along with benign bony hemangiomas in the middle thoracic vertebral bodies. CT abdomen and pelvis with mild progressive metastatic disease from CT of September 2016. Anterior abdominal wall mass superiorly mildly increased in size with new area noted as described measuring 3 x 1.7 cm. 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 Stable area of decreased uptake in the dome of the liver not hypermetabolic on recent PET  scan, likely representing cyst or focal fatty infiltration. Stable small hiatal hernia, fat-containing Bochdalek’s hernia and anterior abdominal wall hernias with stable pelvic floor relaxation.    · 12/1/16 - Cycle 3 chemotherapy given.   · 12/8/16 - Current chemotherapy discontinued and patient started on Gemcitabine 1000 mg/M2 IV days 1, 8, 15 q. 28 days.   · 12/22/16 - Patient seen in followup by Juliana Roy M.D. at the pain clinic, treated with Oxycodone and Lyrica. Transaminases normal. Patient started cycle 1 chemotherapy.   · 12/29/16 - Magnesium 1.1 (1.8-2.5), replaced intravenously.   · 1/5/17 - Cycle 1 day 15 chemotherapy held as patient leaving for vacation to [place]. Chemotherapy dose decreased by 20%. Patient complains of diarrhea for which Imodium prescribed.   · 1/11/17 - BRCA-1 and BRCA-2 negative.   · 1/12/17 - Echocardiogram with ejection fraction 60% or greater.   · 1/19/17 - Comprehensive metabolic panel with no significant abnormality. Patient started cycle 2 chemotherapy.   · 2/2/17 - Urine with >100,000 E-coli treated with Cipro 500 mg p.o. b.i.d. x1 week.   · 2/6/17 - Magnesium 1.1 (1.8-2.5), replaced intravenously.    · 2/23/17 - Patient started cycle 3 chemotherapy.   · 2/27/17 - CT chest with interval development of too numerous to count very small pulmonary nodules, ill-defined ground glass opacities concerning for development of pulmonary metastatic disease. Stable left-sided chest port. CT abdomen and pelvis with stable appearance of multiple small soft tissue densities within the abdomen near midline subcutaneous fat of the abdominal wall. Interval decrease in size of largely calcified left pelvic mass and multiple fat in bowel containing small ventral hernias.   · 3/6/17 - Pulmonary consultation obtained for lung nodules. Discussed CT results with patient. Decision made to continue present chemotherapy until further lung evaluation as abdominal disease better.  of 18  (0-35).    · 3/16/17 - Patient started cycle 4 chemotherapy, but treatment held from day 8 onwards because of hospitalization.   · 3/19/17 - Patient was hospitalized at [facility] between 3/19/17 and 3/25/17 with chest pain. Chest x-ray had revealed increased mild bibasilar airspace disease. Troponin I and EKG’s were negative. INR was elevated. Patient was treated with antibiotics. EGD was performed revealing small hiatal hernia and esophageal dilatation was performed. Bronchoscopy was performed also with no intrabronchial lesions identified. IgA was 51 (), IgG 320 (600-1500) and IgM 19 (). Lexiscan nuclear stress test had no evidence of reversible myocardial ischemia with normal left ventricular ejection fraction of 58%. CT chest had development of patchy bilateral ground glass opacities most pronounced involving the left upper lobe and bilateral lower lobes. Multiple tiny bilateral pulmonary nodules were less conspicuous. The patient underwent a bronchoscopy by Jerica Esparza M.D. with right upper lobe bronchoalveolar lavage revealing benign squamous cells and bronchial cells with pulmonary macrophages present. There was mild acute inflammation. Negative for malignant cells. Prilosec was changed to Famotidine for hypomagnesemia.    · 4/6/17 - Magnesium 1.2 (1.8-2.5). Comprehensive metabolic panel with no significant abnormality. Patient seen in follow-up by Fito Ram M.D. at [facility]. Treated with Lyrica and Oxycodone.    · 5/4/17 - Patient complains of a rash itching on her right upper arm. Eczematous rash noted and patient prescribed Cyclocort 0.1% b.i.d. Magnesium infusions continued with each chemo. Order written to resume chemotherapy.   · 5/16/17 - Cycle 5 chemotherapy started.   · 5/26/17 - Magnesium 1.4 (1.8-2.5), replaced intravenously. Potassium 2.9 (3.6-5.1), KCL increased to 20 mEq three in the morning and three in the evenings.   · 5/30/17 - PET scan with remaining metabolic  activity within the nodular areas. The suggested mass which is partially calcified and necrotic within the left aspect of the pelvis seems slightly larger with increased metabolic activity. There was more calcification in these areas compared to prior study, suggesting inflammation.      · 6/8/17 - Patient complaining of shortness of breath. Does take HCTZ at home. Chest x-ray ordered and chemotherapy continued along with weekly magnesium replacement. Chest x-ray with no acute cardiopulmonary abnormalities.   · 6/13/17 - Patient received cycle 6 of chemotherapy.   · 7/31/17 - WBC 5.0, hemoglobin 11.2, MCV 89.7, platelet count 217,000. Patient is to resume chemotherapy starting with cycle 7 on Wednesday of this week.  of 19 (<35). Potassium 3.3 (3.5-5.3).     · 8/2/17 - Patient began cycle 7 of chemotherapy.   · 8/30/17 - Patient started cycle 8 chemotherapy.   · 9/5/17 - Patient seen in followup at Pain Management by Fito Ram M.D. and continued on treatment with Oxycodone and Lyrica.   · 9/13/17 - Patient was hospitalized at [facility] for a day with dizziness secondary to dehydration, hypokalemia and anemia. She was given 1 unit of packed red blood cells and electrolytes were replaced. Chest x-ray revealed a subtle opacity in the left lateral lung base favored to represent atelectasis. Magnesium 1.3 (1.5-2.5), patient continued on replacement.    · 9/22/17 - Chemotherapy and magnesium replacement continued with decrease in chemotherapy dose by 10% secondary to cytopenias.   · 9/25/17 - Cycle 9 chemotherapy started.   · 10/12/17 - CT of the chest with contrast showed several tiny pulmonary nodules. One within the right lower lobe appears new from prior exams. Two adjacent foci of nodularity within the medial right lower lobe suggestive of minor infectious or inflammatory process. CT of the abdomen and pelvis with contrast which showed soft tissue nodules along the anterior abdominal and pelvic walls  unchanged from previous scan. Also a partially calcified mass within the left pelvis unchanged. No acute process identified within the abdomen or pelvis. Several left paracentral ventral hernias unchanged. No evidence of acute bowel obstruction.   · 10/16/17 - Order written for Levaquin 500 mg p.o. daily as the patient states that she has had a productive cough, fever and chills and with the results of the CT scan showing a possible infectious versus inflammatory process. Order also written to continue chemotherapy and magnesium replacement as previously prescribed.   · 10/23/17 - Cycle 10 chemotherapy started.   · 11/19/17 - Patient went to the Emergency Room at [facility] complaining of right lower extremity redness and fever for a day. Venous Doppler had no evidence of a DVT and patient was discharged home on Clindamycin.   · 11/21/17 -  of 17 (0-35).   · 12/4/17 - Cycle 11 chemotherapy started.   · 12/20/17 - PET scan with multiple hypermetabolic soft tissue nodules abutting the anterior abdominal wall, stable from previous examination. Peripherally calcified left lower quadrant mass near the ascending colon stable in size demonstrating no hypermetabolic uptake. Previously had maximum SUV of 7. Right medial basilar pulmonary nodules resolved.   · 12/22/17 - CT left lower extremity with soft tissue swelling with skin thickening present along the anterior and medial aspect of the leg, slightly more pronounced around the palpable marker seen within the upper medial aspect of the lower extremity.   · 12/26/17 - Elastic stockings prescribed for lower extremity edema.   · 1/8/18 - Patient hospitalized at [facility] between 1/8/18 and 1/11/18 with fever of up to 101 degrees and painful rash on the lower extremities of several weeks’ duration. She was found to be hypokalemic and MRI of the lower extremities revealed thickening and swelling. Chest x-ray had no acute cardiopulmonary abnormality. Skin biopsy was performed by  Surgery revealing stasis dermatitis and no evidence of malignancy. Infectious Disease consultation was obtained and IV antibiotics were stopped. Blood cultures had no growth.   · 1/22/18 - Patient asked to start wearing elastic stockings which she has not started yet. She was given a prescription for Dyazide 1 p.o. daily.   · 2/26/18 - Ordered chemo to resume again. Patient unaware that she was supposed to resume chemo after her last visit in January. Continue magnesium infusions on day 1, 8 and 15. Prescribed Levaquin 500 mg p.o. daily x10 days for productive cough x1 week. History of viral illness consistent with influenza.  of 18 (0-35). Chest x-ray with no acute process.    · 3/5/18 - Cycle 12 chemotherapy started.   · 3/26/18 - Options discussed with patient. Decision made to discontinue IV Gemzar and orders written to start on Niraparib (Zejula) 300 mg p.o. daily as maintenance therapy.   · 4/11/18 - Niraparib discontinued due to insurance denial. Orders written to start Carboplatin-AUC 5 IV day 1 cycling every 21 days. Orders written for Neulasta 6 mg subcutaneous kit day 1 with palliative treatment intent and expected duration of treatment three months.   · 4/12/18 - CT chest, abdomen and pelvis with contrast revealed numerous tiny centrilobular nodules in the lungs favored to reflect infectious or inflammatory process. Nodules ranged 1-3 mm in size and are new from prior studies with metastatic disease not entirely excluded. Stable small hiatal hernia. Interval progression of metastatic disease involving the subcutaneous tissues at the ventral abdominal wall. Multiple soft tissue density nodules previously shown to be hypermetabolic on PET scan. New small crescent of low attenuation material along the periphery of the spleen with similar finding at the dome of the liver. Findings are concerning for peritoneal metastases. Density of the dome of the liver remained unchanged from multiple prior studies.  Stable calcified mass in the left pelvic sidewall. Urinary bladder wall thickening.   · 4/23/18 - Cycle 1 chemotherapy with Carboplatin administered along with Neulasta injection.   · 4/26/18 - Neulasta discontinued due to insurance denial.   · 5/14/18 - Patient received cycle 2 Carboplatin.   · 6/5/18 - Cycle 3 day 1 chemotherapy with Carboplatin administered.   · 6/20/18 - CT chest, abdomen and pelvis at [facility] showed re-demonstration of soft tissue mass in the ventral wall hernia left of the midline as well as the dome of the liver, likely representing metastatic disease similar as compared to the prior study. Additional calcified lesions present in the upper left hemipelvis and along the anterior abdominal wall to the right of the midline also likely representing metastatic disease. No definite new lesions were identified. Moderate to large stool burden likely related to mild constipation was present. No suspicious lung nodules were identified. The previously-described ground glass nodules appeared to have resolved. There was a stable subpleural nodule in the right upper lobe measuring 3 mm present since 2014 without significant changes.   · 6/26/18 - Cycle 4 day 1 Carboplatin administered with addition of Neulasta for febrile neutropenia prophylaxis.   · 6/29/18 - Reviewed CT scans with patient. Orders written to discontinue Carboplatin and Neulasta and plan to start Niraparib 300 mg by mouth daily as maintenance therapy. Prescribed Amlodipine 2.5 mg p.o. daily for chemo-induced hypertension.   · 7/18/18 - Orders written to increase weekly Magnesium Sulfate to 3 g IV weekly due to continued hypomagnesemia.   · 8/1/18 - Patient reports she has not received Niraparib (Zejula) to date.   · 8/30/18 - Patient’s phone was not working so though Niraparib approved, the drug company had not been able to get ahold of her. Patient supplied with drug company number so that she can start taking the pills.    · 9/6/18 -  of 20 (N).   · 9/18/18 - Urinalysis done for dysuria and back pain. Urine culture grew 20,000 to 50,000 colonies of urogenital ruy. Prescribed Bactrim DS one tablet twice daily for one week.   · September 2018 - Patient initiated on Zejula 300 mg p.o. daily.   · 10/1/18 - WBC 3.5, hemoglobin 12, platelet count 74,000. Niraparib (Zejula) dose held and then resumed when platelets above 100,000 at a dose of 200 mg daily.   · 10/15/18 - Patient received Niraparib (Zejula) at 200 mg p.o. daily with a platelet count of 198,000.   · 11/7/18 - WBC 6, hemoglobin 11.6, MCV 96.2, platelet count 137,000. Patient claims to have stopped taking Niraparib a few days prior because of cough. Asked to resume taking it. Ciprofloxacin 500 mg p.o. twice daily for one week for bronchitis.   · 12/3/18 - Magnesium Sulfate infusions changed to every two weeks, to send mag level before and give 3 grams. DC’d Magnesium Oxide and started Magnesium Plus Protein two pills twice daily.   · 1/4/19 - CT chest, abdomen and pelvis with new patchy nodular areas of airspace consolidation within the bilateral upper lobes, left greater than right, favored to be infectious or inflammatory. Interval enlargement of the peritoneal soft tissue masses within the pelvis compatible with progression of disease. Enlarging ventral hernia now containing multiple loops of small bowel and colon.   · 1/7/19 - Patient has not been coming in for weekly magnesium replacement as nursing has not been able to get ahold of her. Results of CT’s discussed with patient. Decision made to change her to IV chemotherapy with Carboplatin AUC-5 day 1 and pegylated liposomal Doxorubicin (Doxil) 30 mg/M2 IV day 1 to cycle every four weeks. Patient made aware of the limited choices of her treatment available.   · 1/31/19 - Echocardiogram showed LVEF 50-55% and otherwise normal echo Doppler study.   · 2/4/19 - New chemotherapy not initiated to date with difficulty  contacting patient for echocardiogram. Neulasta On-Pro 6 mg ordered with chemotherapy due to extensive prior exposure to chemotherapy, increasing risk of febrile neutropenia, history of neutropenic events on prior chemotherapy regimens.   · 2/15/19 - Patient started cycle 1 chemotherapy with Neulasta support.   · 3/6/19 -  of 28 (0-35).   · 3/15/19 - Cycle 2 Carboplatin with Liposomal Doxorubicin (Doxil) and Neulasta support initiated.     · 4/10/19 - Patient was requested to followup with Dr. Queen regarding hypotension and blood pressure medication dosing. Patient appears to be tolerating treatment well with cytopenias not requiring dose adjustments. No Doxil-related skin or mucus membrane toxicities or other significant toxicities to date.   · 4/12/19 - Cycle 3 chemotherapy given.   · 4/16/19 - CT chest, abdomen and pelvis with no evidence of active metastasis to the chest. Several tree-in-bud nodules within the periphery of the inferior right upper lobe likely infectious or inflammatory. Disease burden within abdomen and pelvis stable to slightly increased from prior CT. Specifically, a soft tissue mass along the right rectus muscle slightly increased in bulk. Multiple ventral hernias, some containing loops of bowel, with no evidence of acute bowel obstruction.   · 5/8/19 - Discussed CT results with patient. Overall stable disease and would like to continue same treatment. Dove soap and Hydrocortisone Cream p.r.n. prescribed for scattered rash on back.   · 5/10/19 - Cycle 4 Carboplatin and Liposomal Doxorubicin initiated.   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 It was ER positive, HER2/zuleima negative, PD-L1 negative. There was TOPO1 positivity and TUBB3 positivity. TMB was low (two mutations per megabyte MUTS/MB) and MSI was stable. There were 13 therapies considered with increased benefit. The 13 therapies with increased benefit  included Fulvestrant, Irinotecan, Letrozole, Topotecan, Anastrazole, Exemestane, Tamoxifen, all of which were referenced to [facility] as well as Abemaciclib, Everolimus, Gefitinib, Palbociclib, Ribociclib and Toremifene.     · 6/5/19 echocardiogram with preserved LV systolic function with EF around 60%.  · 6/7/19 cycle 5 chemotherapy with Neulasta support given.  Patient continued on mag oxide 400 mg p.o. twice daily and weekly IV 3 g mag sulfate infusions for persistent hypomagnesemia.  · 7/5/2019- cycle 6 chemotherapy with carboplatin and doxorubicin with Neulasta port initiated.  Ca125:  21 (0-35).  · 7/23/2019-CT chest abdomen and pelvis compared to CT from 4/16/2019 revealed multiple small pulmonary nodules unchanged from prior examination within the right upper and left lower lobes.  Complex ventral hernia similar to prior exam.  Soft tissue nodule along the right lateral margin of the right of the midline measuring 12 x 10 mm unchanged in size.  Irregular soft tissue nodular thickening along the margin of the most inferior aspect of the complex ventral hernia with thickness measuring up to 13 mm similar to prior examination.  Extensive calcified and soft tissue mass within the left lower quadrant decrease in size now measuring 2.6 x 2.3 cm previously 3.0 x 2.5 cm.  Partially calcified mass involving the right rectus sheath measuring 3.1 x 2.7 cm previously measured 3.3 x 2.9 cm.  Densely calcified mass measuring 2.1 x 1.6 cm unchanged previously measured 2 x 1.7 cm.  Right external iliac chain lymph node measuring 9 mm previously measured 5 mm.  · 8/2/2019 cycle 7 chemotherapy given.  · 8/30/2019-cycle 8 chemotherapy with carboplatin and doxorubicin with Neulasta support given.  · 9/27/2019 cycle 9 chemotherapy given.  · 10/1/19 - Echocardiogram showed Normal LV size and contractility EF of 60-65%. Normal RV size. Borderline left atrial enlargement. Aortic valve, mitral valve, tricuspid valve appears structurally  normal, no significant regurgitation seen.No pericardial effusion seen. Proximal aorta appears normal in size.  · 10/18/19 - Magnesium 1.5 (1.8-2.5).  IV magnesium replacement continued.  · 10/25/2019 cycle 10 chemotherapy given.  · 10/29/2019 patient had CT scan of the chest abdomen and pelvis-there is a new 2 mm nodule in the left lower lobe with a stable 2 mm nodule in the left lower lobe.  Follow-up in 6 months was recommended.  Stable multiple soft tissue density and calcified nodules within the ventral abdominal wall and left retroperitoneal fat consistent with nonviable metastatic disease.  These nodules have either decreased in size or stable.  · 11/22/2019 10 received cycle 11 of chemotherapy with Doxil and carboplatinum with Neulasta  · 12/8/2019 to 12/11/2019 patient was admitted to the hospital for neutropenic fever.  · 12/20/2019 patient received cycle 12 of chemotherapy with Doxil and carboplatinum with Neulasta  · 1/13/20: 2 D echo was normal with EF 56% to 60%  · 1/24/20-Patient received cycle 13 of combination chemotherapy with carboplatinum and Doxil  · 2/3/2020 patient had a  which was 25.4  · 2/21/2020-patient received cycle 14 of chemotherapy with carboplatinum and Doxil  · 3/6/2020 patient had CT scan of the chest, abdomen and pelvis this revealed a 3 mm nodule in the left lower lobe present on prior CT of the abdomen unchanged from July 2019.  Stable 3 mm right upper lobe nodule.  There is a lymph node in the AP window measuring 8 x 9 mm prominent left hilar lymph node measuring 12 mm previously was 7 x 7 mm no significant adenopathy was seen in the abdomen there is an area of irregular soft tissue in the left paramidline ventral hernia which is stable measuring 5.7 cm partially calcified mass in the right rectus measuring 3 x 2.5 cm which is also stable the prominent lymph nodes in the left mid hilum and mediastinum may be reactive or metastatic disease  · 4/17/2020-patient had cycle 15  of single agent carboplatinum  · 5/26/2020 patient had CT scan of the chest, abdomen and pelvis showed 3 new lung nodules measuring 7 mm in the left upper lobe, 5 mm in the left lower lobe and 4 mm in the right lower lobe.  There were additional small lung nodules which were unchanged.  Mildly prominent mediastinal and bilateral hilar lymph nodes mildly increased in size.  Right hilar node 9 mm previously was 5 mm.  Carinal node 9 mm previously was 6 mm.  The nodule at the right side of the hernia sac has increased to 1.5 cm from 1.2 cm.  Also a nodule in the subcutaneous fat currently measures 2.4 was previously 2 cm.  · 5/15/2020-patient received cycle 16 of carboplatinum  · Since the last visit in the office patient patient developed dizzy spell and fell. For that reason she was taken to the emergency room at [place].    · 7/3/2020 she had brain MRI which showed an 8 mm focus of abnormality in the left aspect of the posterior fossa corresponding to a dural based enhancing lesion thought to represent a calcified meningioma vessels calcified dural based metastasis.  This lesion has a slight local mass-effect and a small amount of surrounding edema.  There is also a 4 to 5 mm rounded focus of abnormal nodular enhancement along the cortex of the left parietal lobe but no significant mass-effect.  This is nonspecific could represent neoplastic process, infectious/inflammatory process or granulomatous disease.  · Patient was seen by neurosurgery, follow-up brain MRI in 8 weeks has been recommended by Dr. Hartman  · 7/9/2020 patient was initiated on single agent topotecan cycle 1 day 1  · 7/16/2020 she received the 8 topotecan  · 8/6/2020 patient received cycle 1 day 15 of topotecan  · 9/2/2020 patient had brain MRI multiple hospital which showed interval increase in size of the metastasis in the left parietal lobe now measuring 9 x 7 mm.  Previously was 5 mm.  There has been interval increase in size of the left superior  cerebellar metastases now measuring 1.3 cm previously was 8 mm.  There was no new metastatic disease identified.  · 9/11/2020 patient received cycle 2-day 15 of single agent topotecan  · Was admitted to the hospital 10/1/2020 for supratherapeutic INR  · 10/13/2020 patient was seen by Dr. Delong she has started stereotactic brain radiation to be completed on 10/28/2020  · 11/6/2020 patient received cycle 3-day 1 of chemotherapy with topotecan  · 11/13/2020 patient received cycle 3-day 8 of topotecan  · 11/30/2020 patient had CT scan of the chest, abdomen and pelvis this showed new reticular nodular interstitial thickening within the right lung mostly in the right middle lobe and right lower lobe worrisome for lymphangitic spread of cancer.  Evidence of progression of metastatic disease in the chest, abdomen and pelvis the new tiny sclerotic foci within the spine worrisome for osseous metastatic disease.  · 12/17/2020 - Discontinued topotecan due to progressive disease with orders written to begin Alimta 500 mg per metered squared IV q. 21 days  · 12/30/2020 - Started cycle 1 day 1 Alimta      2. Deep vein thrombosis of left upper extremity diagnosis established in October of 2013.  · 10/16/13 - Venous Doppler of left upper extremity.  Impression:  Deep vein thrombosis involving the subclavian, axillary and brachial veins on the left side, superficial vein thrombosis involving the left basilic vein.  · 10/16/13 - Order written for Lovenox 120 mg subcutaneously daily until INR is in therapeutic range.  Order written for Coumadin 5 mg p.o. daily.  The patient is to follow PT and INR protocol.  · 5/20/16 - Venous Doppler bilateral lower extremities normal.  · 7/30/19 - Patient continued on Coumadin following the INR protocol.      3. Anemia diagnosis established in November 2013 and pernicious anemia diagnosis established March 2016.   · 11/15/13 - Hemoglobin is 7.8.  · 12/2/13 - Orders written to start Aranesp 200 mg  subcutaneous every two weeks due to low hemoglobin secondary to chemo induced anemia.  Hemoglobin is 9.7.  Anemia workup is ordered.  · 12/03/13 - Ferritin 179.8 (N), folic acid 8.3 (N), vitamin B12 314, iron 104 (N), TIBC 298 (N), iron saturation 35 (N), Reticulocyte count 0.88 (N).  EPO level 124 (N).  Haptoglobin 22 (H).  · 10/22/14 - Hemoglobin 12.5, hematocrit 37.3, MCV 91.6.  · 10/23/14 - Anemia resolved.   · 3/8/16 - WBC 5.8, hemoglobin 11.7, MCV 90.3, platelet count 245,000. Vitamin B12 of 189 (180-914), ferritin 61 (), iron 91 (), TIBC 345 (228-428).   · 3/15/16 -  ().        · 3/22/16 - Intrinsic factor antibody positive, antiparietal antibody negative. Vitamin B12 at 1000 mcg IM weekly started, but the patient after receiving first dose on 3/22/16 did not come back for injections until 4/13/16.   · 4/13/16 - WBC 5.1, hemoglobin 12.5, MCV 87.9, platelet count 201,000. Patient given B12 injection and then continued at home.   · 5/10/16 - WBC 5.1, hemoglobin 12.5, platelet count 201,000.   · 6/14/16 - Monthly Vitamin B12 injections continued at home.   · 7/11/16 - WBC 5.48, hemoglobin 12.7, MCV 86.2, platelet count 200,000.   · 8/16/16 - Patient continued on monthly Vitamin B12 injections at home.   · 1/5/17 - WBC 2.6 with 38% neutrophils, 56% lymphocytes, 5% monocytes, hemoglobin 10.5, .3, platelet count 63,000. Patient continued on Vitamin B12 injections 1000 mcg IM monthly at home.   · 3/20/17 - Retic 0.41 (0.5-1.5), creatinine 1.0 (0.4-1.0). Iron 12 (), TIBC 270 (228-428), ferritin 259 (), haptoglobin 340 (), TSH 0.43 (0.34-5.6), folate 6.0 (5.9-24.8). Serum protein electrophoresis revealed decreased gammaglobulins. Serum EDU had no monoclonal gammopathy identified. Stool Hemoccult was negative.   · 6/8/17 - WBC 6.3, hemoglobin 8.3, MCV 92.4, platelet count 50,000. Procrit 40,000 units subq weekly added for chemotherapy-induced anemia. Patient continued  on Vitamin B12 at 1000 mcg IM monthly at home.   · 7/5/17 - Iron 33 (), TIBC 328 (228-428), ferritin 211 (), TSH 2.46 (0.34-5.6).       · 7/31/17 - WBC 5.0, hemoglobin 11.2, MCV 89.7, platelet count 217,000. We will continue with the Procrit 40,000 units subq weekly for chemo-induced anemia when the hemoglobin falls below 10.0 and the patient is also to continue with the Vitamin B12 at 1000 mcg IM monthly at home. Creatinine 1.24 (0.5-0.99).    · 8/28/17 - WBC 9.6, hemoglobin 8.7, MCV 93.7, platelet count 133,000. Patient is to continue with the Procrit 40,000 units subq weekly and will receive that today. She is also to continue with the Vitamin B12 at 1000 mcg IM monthly at home.  · 10/16/17 - WBC 7.5, hemoglobin 9.6, MCV 98.4, platelet count 195,000. Patient is to continue with Procrit 40,000 units subq weekly and will receive Procrit today.   · 1/1/18 - Creatinine 1.3 (0.4-1.0).    · 2/19/18 - Procrit given for hemoglobin 9.9.   · 2/26/18 - Continue Procrit 40,000 units weekly p.r.n. hemoglobin <10. Continue Vitamin B12 at 1000 mcg IM monthly at home.   · 3/26/18 - Hemoglobin 8.3. Procrit dose increased to 60,000 units weekly. Iron 29 (), TIBC 306 (228-428), and iron sat 9% (15-50). Iron 29 (), TIBC 306 (228-428), iron saturation 9% (15-50).   · 3/27/18 - Order written to start Ferrous sulfate 325 mg p.o. b.i.d.    · 4/2/18 - Stool heme negative x3.   · 4/30/18 - Patient had not started Ferrous sulfate 325 mg p.o. b.i.d. and verbalized understanding to do so and to notify the office if side effects are not tolerable.   · 5/24/18 - Patient was hospitalized at [facility] between 5/24/18 and 5/26/18 with dark tarry stool. INR was subtherapeutic. She was given IV Protonix and Protonix 40 mg daily. She underwent an EGD by David Rogers M.D. revealing small hiatal hernia, small submucosal nodule near the cardia, erosive gastritis. Pathology on gastric antrum and body biopsy revealed iron pill  gastritis and no evidence of Helicobacter pylori. She then underwent a colonoscopy revealing diverticulosis, hemorrhoids and surgical changes from previous resection. It was recommended to consider outpatient M2A if hemoglobin continued to drop.   · 6/4/18 - Order written to discontinue iron pills and to use Injectafer 750 mg IV days 1 and 8 for low iron sats. Order written for Procrit 40,000 units subq weekly. Iron 65 (), TIBC 328 (228-428), iron saturation 20% (15-50), ferritin 123 ().   · 6/29/18 - Discussed patient’s intolerance of oral iron due to gastritis. Oral iron discontinued with plans for Injectafer should iron level drop in the future.   · 11/7/18 - Patient claims not to be taking Vitamin B12 injections at home. Asked to resume those.   · 12/3/18 - Patient reports she has not been taking her B12 injections for a couple of months. She needs some syringes and needles for that.   · 2/4/19 - Patient was uncertain if she is currently taking ferrous Sulfate or not. Patient with history of intolerance and will follow hemoglobin.   · 5/8/19 - Ferritin 64 ().  · 8/27/2019- iron 52 (), iron saturation 14% (15-50), TIBC 364 (228-420), and ferritin 74 ().  Injectafer 750 mg IV day 1 and 8 due to oral iron intolerance ordered.  · 8/30/2019- Injectafer 750 mg IV day 1 given.  Hemoglobin 10.8.  At the end of the infusion heart rate was 48 and the patient reported mild dizziness.  Patient was sent to the ED for evaluation with symptoms resolving rapidly.  Chest x-ray and CT head were negative for acute findings.  · 9/6/2019-Injectafer 750 mg IV day 8 given without adverse effects.  Hemoglobin 9.8.   · 9/25/19 - Iron 85 (). Iron saturation 25 (15-50). TIBC 335 (228-428). Ferritin  694 ().  Hemoglobin 10.3.  · 10/25/2019 hemoglobin 9.7.  Patient started on Retacrit 40,000 units subcu weekly.  · 12/17/2020 hemoglobin 11.9, hematocrit 38.3     Past Medical History:   Diagnosis Date    • Anemia 2013   • Cervical disc disorder 2013    Herniated disc, pinched nerve   • Clotting disorder (CMS/HCC) 2013    Low platelets from chemo   • Colon polyp 2013   • Deep vein thrombosis (CMS/[facility]) 2013   • Diverticulosis 2013   • GERD (gastroesophageal reflux disease) 2016   • HL (hearing loss) 2016    I need hearing aids   • Hyperlipidemia 2013    Need my cholesterol rechecked   • Hypertension    • Joint pain 2013    Shoulder pain, torn rotator cuff   • Low back pain 2013   • Neuromuscular disorder (CMS/[facility]) 2015    Shingles, pinched nerves in my neck   • Osteopenia 2014   • Osteoporosis    • Ovarian cancer (CMS/[facility])     ovarian   • Ovarian cancer on left (CMS/[facility]) 1995   • Pneumonia 2010    Have had it several times   • Spinal stenosis 2013    Cervical   • Urinary tract infection 2013    Have had several utis       Past Surgical History:   Procedure Laterality Date   • CATARACT EXTRACTION     • COLON SURGERY     • COLONOSCOPY  05/26/2018   • EXPLORATORY LAPAROTOMY     • HERNIA REPAIR     • HYSTERECTOMY     • OOPHORECTOMY           Current Outpatient Medications:   •  acetaminophen (TYLENOL) 500 MG tablet, Take 1,000 mg by mouth Every 6 (Six) Hours As Needed for Mild Pain ., Disp: , Rfl:   •  albuterol sulfate  (90 Base) MCG/ACT inhaler, Inhale 2 puffs Every 4 (Four) Hours As Needed for Wheezing., Disp: 18 g, Rfl: 0  •  atorvastatin (LIPITOR) 20 MG tablet, Take 20 mg by mouth every night at bedtime., Disp: , Rfl: 3  •  benzonatate (TESSALON) 200 MG capsule, Take 1 capsule by mouth 3 (Three) Times a Day As Needed for Cough., Disp: 30 capsule, Rfl: 0  •  cyanocobalamin 1000 MCG/ML injection, Inject 1,000 mcg into the appropriate muscle as directed by prescriber Every 28 (Twenty-Eight) Days., Disp: , Rfl:   •  dexamethasone (DECADRON) 4 MG tablet, Take 1 tablet oral twice a day for 3 consecutive days beginning the day before chemotherapy and continue for 6 doses.Take with food., Disp: 6 tablet, Rfl: 5  •   doxycycline (MONODOX) 100 MG capsule, Take 1 capsule by mouth 2 (Two) Times a Day., Disp: 20 capsule, Rfl: 0  •  famotidine (PEPCID) 40 MG tablet, TAKE 1 TABLET BY MOUTH EVERY MORNING BEFORE BREAKFAST, Disp: 90 tablet, Rfl: 1  •  folic acid (FOLVITE) 1 MG tablet, Take 1 tablet by mouth Daily. Start at least 7 days prior to chemotherapy until at least 3 weeks after all chemotherapy., Disp: 30 tablet, Rfl: 6  •  guaiFENesin-codeine (GUAIFENESIN AC) 100-10 MG/5ML liquid, Take 5 mL by mouth 3 (Three) Times a Day As Needed for Cough., Disp: 120 mL, Rfl: 0  •  KLOR-CON 20 MEQ CR tablet, TAKE 3 TABLETS BY MOUTH EVERY MORNING AND TAKE 2 TABLETS AT BEDTIME, Disp: 150 tablet, Rfl: 5  •  magnesium oxide (MAG-OX) 400 MG tablet, TAKE 1 TABLET BY MOUTH TWICE A DAY, Disp: 180 tablet, Rfl: 1  •  ondansetron ODT (ZOFRAN-ODT) 8 MG disintegrating tablet, Place 1 tablet under the tongue Every 8 (Eight) Hours As Needed for Nausea or Vomiting., Disp: 30 tablet, Rfl: 3  •  oxyCODONE (ROXICODONE) 10 MG tablet, Take 1 tablet by mouth 3 (Three) Times a Day As Needed for Moderate Pain ., Disp: 90 tablet, Rfl: 0  •  pregabalin (LYRICA) 100 MG capsule, Take 1 capsule by mouth 3 (Three) Times a Day., Disp: 90 capsule, Rfl: 2  •  sertraline (ZOLOFT) 50 MG tablet, TAKE 1 TABLET BY MOUTH EVERY EVENING BEFORE BED, Disp: 90 tablet, Rfl: 1  •  temazepam (RESTORIL) 30 MG capsule, Take 1 capsule by mouth At Night As Needed for Sleep., Disp: 30 capsule, Rfl: 1  •  triamterene-hydrochlorothiazide (DYAZIDE) 37.5-25 MG per capsule, TAKE 1 CAPSULE BY MOUTH EVERY DAY, Disp: 90 capsule, Rfl: 1  •  warfarin (COUMADIN) 5 MG tablet, Take 1 tablet by mouth Daily. At 1700 as directed, Disp: 30 tablet, Rfl: 0  No current facility-administered medications for this visit.     Allergies   Allergen Reactions   • Morphine Nausea Only and Hives   • Penicillin V Potassium Rash   • Sulfa Antibiotics Rash   • Levaquin [Levofloxacin] Nausea Only and Dizziness     When taken  with Coumadin   • Amoxicillin Rash   • Norco [Hydrocodone-Acetaminophen] Rash       Family History   Problem Relation Age of Onset   • Hypertension Mother    • Cancer Father    • Hypertension Father    • Hypertension Sister    • Hypertension Brother    • Stroke Brother        Cancer-related family history includes Cancer in her father.    Social History     Tobacco Use   • Smoking status: Never Smoker   • Smokeless tobacco: Never Used   Substance Use Topics   • Alcohol use: No     Frequency: Never     Comment: caffeine 32-64oz qd   • Drug use: No     HPI, ROS and PFSH have been reviewed and confirmed on 1/14/2021.     SUBJECTIVE:    Patient is here today for follow-up.  She does not have any specific complaints today.  She tolerated Alimta very well.      REVIEW OF SYSTEMS:    Review of Systems   Constitutional: Positive for fatigue ( mild ). Negative for activity change, appetite change, chills, fever and unexpected weight change.   HENT: Negative for mouth sores, sore throat and tinnitus.    Eyes: Negative for photophobia, pain, redness and visual disturbance.        Wears glasses   Needs cataract surgery    Respiratory: Positive for cough ( mild - dry ). Negative for shortness of breath.    Cardiovascular: Negative for chest pain, palpitations and leg swelling.   Gastrointestinal: Negative for abdominal pain, constipation, diarrhea, nausea and vomiting.   Genitourinary: Negative for dysuria and hematuria.   Musculoskeletal: Negative for arthralgias, back pain, myalgias and neck pain.   Skin: Negative for rash and wound.   Allergic/Immunologic: Negative for environmental allergies.   Neurological: Positive for headaches ( daily ). Negative for dizziness, weakness, light-headedness and numbness.   Hematological: Negative for adenopathy. Does not bruise/bleed easily.   Psychiatric/Behavioral: Negative for dysphoric mood. The patient is not nervous/anxious.      OBJECTIVE:  Vitals:    01/14/21 1207   BP: 132/76  "  Pulse: 86   Resp: 16   Temp: 97.1 °F (36.2 °C)   Weight: 80.1 kg (176 lb 9.6 oz)   Height: 165.1 cm (65\")   PainSc: 0-No pain     Temp 97.5   Pulse 64  RR 18  /92  Height 165.1 cm   Weight 81.6 kg   BSA 1.89  BMI 30    ECOG  (1) Restricted in physically strenuous activity, ambulatory and able to do work of light nature    Physical Exam   Constitutional: She is oriented to person, place, and time. No distress.   Obese    HENT:   Head: Normocephalic and atraumatic.   Mouth/Throat: Mucous membranes are moist.   Eyes: Conjunctivae are normal. Right eye exhibits no discharge. Left eye exhibits no discharge. No scleral icterus.   Neck: Normal range of motion. Neck supple. No thyromegaly present.   Cardiovascular: Normal rate, regular rhythm and normal heart sounds. Exam reveals no gallop and no friction rub.   Pulmonary/Chest: Effort normal. No stridor. No respiratory distress. She has no wheezes.   Left chest wall port    Abdominal: Soft. Bowel sounds are normal. She exhibits no mass. There is no abdominal tenderness. There is no rebound and no guarding.   Musculoskeletal: Normal range of motion. No tenderness.   Lymphadenopathy:     She has no cervical adenopathy.   Neurological: She is alert and oriented to person, place, and time. She exhibits normal muscle tone.   Skin: Skin is warm and dry. She is not diaphoretic. No erythema.   Psychiatric: Her behavior is normal. Mood normal.   Nursing note and vitals reviewed.    RECENT LABS    WBC   Date Value Ref Range Status   01/14/2021 5.21 3.40 - 10.80 10*3/mm3 Final   07/05/2020 4.04 (L) 4.5 - 11.0 10*3/uL Final     RBC   Date Value Ref Range Status   01/14/2021 3.67 (L) 3.77 - 5.28 10*6/mm3 Final   07/05/2020 3.68 (L) 4.0 - 5.2 10*6/uL Final     Hemoglobin   Date Value Ref Range Status   01/14/2021 10.5 (L) 12.0 - 15.9 g/dL Final   07/05/2020 11.5 (L) 12.0 - 16.0 g/dL Final     Hematocrit   Date Value Ref Range Status   01/14/2021 33.6 (L) 34.0 - 46.6 % Final "   07/05/2020 35.3 (L) 36.0 - 46.0 % Final     MCV   Date Value Ref Range Status   01/14/2021 91.6 79.0 - 97.0 fL Final   07/05/2020 95.9 80.0 - 100.0 fL Final     MCH   Date Value Ref Range Status   01/14/2021 28.6 26.6 - 33.0 pg Final   07/05/2020 31.3 26.0 - 34.0 pg Final     MCHC   Date Value Ref Range Status   01/14/2021 31.3 (L) 31.5 - 35.7 g/dL Final   07/05/2020 32.6 31.0 - 37.0 g/dL Final     RDW   Date Value Ref Range Status   01/14/2021 16.3 (H) 12.3 - 15.4 % Final   07/05/2020 15.9 12.0 - 16.8 % Final     RDW-SD   Date Value Ref Range Status   01/14/2021 50.9 37.0 - 54.0 fl Final     MPV   Date Value Ref Range Status   01/14/2021 10.6 6.0 - 12.0 fL Final   07/05/2020 10.2 6.7 - 10.8 fL Final     Platelets   Date Value Ref Range Status   01/14/2021 108 (L) 140 - 450 10*3/mm3 Final   07/05/2020 158 140 - 440 10*3/uL Final     Neutrophil Rel %   Date Value Ref Range Status   07/05/2020 54.0 45 - 80 % Final     Neutrophil %   Date Value Ref Range Status   01/14/2021 57.8 42.7 - 76.0 % Final     Lymphocyte Rel %   Date Value Ref Range Status   07/05/2020 30.4 15 - 50 % Final     Lymphocyte %   Date Value Ref Range Status   01/14/2021 25.3 19.6 - 45.3 % Final     Monocyte Rel %   Date Value Ref Range Status   07/05/2020 12.4 0 - 15 % Final     Monocyte %   Date Value Ref Range Status   01/14/2021 14.8 (H) 5.0 - 12.0 % Final     Eosinophil %   Date Value Ref Range Status   01/14/2021 1.9 0.3 - 6.2 % Final   07/05/2020 3.0 0 - 7 % Final     Basophil Rel %   Date Value Ref Range Status   07/05/2020 0.2 0 - 2 % Final     Basophil %   Date Value Ref Range Status   01/14/2021 0.2 0.0 - 1.5 % Final     Immature Grans %   Date Value Ref Range Status   07/05/2020 0.0 0 % Final     Neutrophils Absolute   Date Value Ref Range Status   07/05/2020 2.18 2.0 - 8.8 10*3/uL Final     Neutrophils, Absolute   Date Value Ref Range Status   01/14/2021 3.01 1.70 - 7.00 10*3/mm3 Final     Lymphocytes Absolute   Date Value Ref Range  Status   07/05/2020 1.23 0.7 - 5.5 10*3/uL Final     Lymphocytes, Absolute   Date Value Ref Range Status   01/14/2021 1.32 0.70 - 3.10 10*3/mm3 Final     Monocytes Absolute   Date Value Ref Range Status   07/05/2020 0.50 0.0 - 1.7 10*3/uL Final     Monocytes, Absolute   Date Value Ref Range Status   01/14/2021 0.77 0.10 - 0.90 10*3/mm3 Final     Eosinophils Absolute   Date Value Ref Range Status   07/05/2020 0.12 0.0 - 0.8 10*3/uL Final     Eosinophils, Absolute   Date Value Ref Range Status   01/14/2021 0.10 0.00 - 0.40 10*3/mm3 Final     Basophils Absolute   Date Value Ref Range Status   07/05/2020 0.01 0.0 - 0.2 10*3/uL Final     Basophils, Absolute   Date Value Ref Range Status   01/14/2021 0.01 0.00 - 0.20 10*3/mm3 Final     Immature Grans, Absolute   Date Value Ref Range Status   07/05/2020 0.00 <1 10*3/uL Final     nRBC   Date Value Ref Range Status   10/02/2020 0.1 0.0 - 0.2 /100 WBC Final       Lab Results   Component Value Date    GLUCOSE 89 12/30/2020    BUN 16 12/30/2020    CREATININE 0.80 12/30/2020    EGFRIFNONA 72 12/30/2020    EGFRIFAFRI >60 06/07/2019    BCR 20.0 12/30/2020    K 4.0 12/30/2020    CO2 25.0 12/30/2020    CALCIUM 9.2 12/30/2020    ALBUMIN 3.70 12/30/2020    LABIL2 1.3 07/03/2020    AST 16 12/30/2020    ALT 7 12/30/2020     ASSESSMENT:    1. Recurrent ovarian cancer metastases to the brain was on carboplatinum, Doxil.  Patient had had carboplatinum Taxol, carbo Taxotere, Gemzar single agent, niraparib and was on Doxil and carboplatin.  Doxil was discontinued due to approaching of lifetime dosing.  Progressed on single agent topotecan.  Refer to paradigm testing for future options at time of progression.  Patient has had progression of disease and therefore platinum was discontinued.  She is now on Alimta single agent  2. Patient has evidence of disease progression in the chest, abdomen and pelvis now with bone involvement as well. Started Alimta on 12/30/2020.  Continue the  same  3. Progressive brain metastasis: Status post stereotactic brain radiation under the care of Dr. Delong and recent MRI of the brain on 12/15/2020 shows probable progression.  Dr. Delong following,  4. Iron deficiency anemia: Intermittently on  Iron  5. B12 deficiency on parenteral B12 injections  6. History of DVT on anticoagulation therapy with warfarin  7. Pancytopenia: Due to chemotherapy: On Procrit  8. Probable bone metastasis: If confirmed on bone scan, will add biphosphonate therapy  9. Hypomagnesemia: Continue to monitor levels and infuse as needed  10. Monitoring for chemotherapy toxicities on Alimta  11. Iron deficiency anemia   12. Assessment has been reviewed and updated as documented above    PLAN:    1. Continue Alimta  2. Schedule bone scan  3.  monthly: Check level today.  INR today  4. Continue folic acid 1 mg p.o. daily -reviewed needs to stay on drug to prevent side effects from Alimta   5. Continue B12 injections 1000 mcg IM monthly, next due 1/14/2021  6. Status post IV Injectafer  7. Continue magnesium oxide 400 mg three times a day and weekly IV replacement as needed.   8. Continue Retacrit 40,000 units subcu weekly for hemoglobin less than 10, for chemotherapy-induced anemia  9. Continue supportive medications  10. MRI brain ordered by Dr. Delong for 1/29/2021  11. Reviewed fever precautions   12. RTC in 3 weeks  13. All questions answered     I have reviewed labs results, imaging, vitals, and medications with the patient today.   Lab Results   Component Value Date    WBC 5.21 01/14/2021    HGB 10.5 (L) 01/14/2021    HCT 33.6 (L) 01/14/2021    MCV 91.6 01/14/2021     (L) 01/14/2021     Patient verbalized understanding and is in agreement of the above plans.

## 2021-01-12 NOTE — PROGRESS NOTES
"Subjective   Tahira Zimmerman is a 65 y.o. female.     neck and shoulder pain, all over aching \"bone\" due to chemo--also left rotator cuff tear also broke out with shingles-under breast on back and on head. 9/10 at worst, 2/10 at best. Nonradiating. Always present, varies, interferes with daily activities. Imaging shows metastatic disease. Lengthy prior notes reviwed, treated for metastatic disease, failed Tramadol, cannot tolerate Hydrocodone, could tolerate Oxycodone 5mg, taking BID, also Lyrica 75mg BID. Started new chemo, worsening pain in b/l hips and legs, especially at night. Now taking Oxycodone 10mg TID prn and Lyrica 100mg BID with good relief. Started another new chemo with worsening pain, on a break while insurance approves pill form with improved pain. New chemo pill lowered Mg, holding it while getting Mg infusions. Shingles outbreak. Started another new chemo, has aches controlled by current meds. Had fall, 2 \"somethings\" found on her brain on imaging. Holding chemo with PNA, on ABX.       The following portions of the patient's history were reviewed and updated as appropriate: allergies, current medications, past family history, past medical history, past social history, past surgical history and problem list.    Review of Systems   Constitutional: Negative for chills, fatigue and fever.   HENT: Positive for hearing loss. Negative for trouble swallowing.    Eyes: Negative for visual disturbance.   Respiratory: Negative for shortness of breath.    Cardiovascular: Negative for chest pain.   Gastrointestinal: Positive for abdominal pain and nausea. Negative for constipation, diarrhea and vomiting.   Genitourinary: Negative for urinary incontinence.   Musculoskeletal: Positive for arthralgias, back pain and neck pain. Negative for joint swelling and myalgias.   Neurological: Positive for headache. Negative for dizziness, weakness and numbness.       Objective   Physical Exam   Constitutional: She is " oriented to person, place, and time. She appears well-developed and well-nourished.   HENT:   Head: Normocephalic and atraumatic.   Eyes: Pupils are equal, round, and reactive to light.   Neck: Normal range of motion.   Cardiovascular: Normal rate, regular rhythm and normal heart sounds.   Pulmonary/Chest: Breath sounds normal.   Abdominal: Soft. Bowel sounds are normal. She exhibits no distension. There is no abdominal tenderness.   Neurological: She is alert and oriented to person, place, and time. She has normal reflexes. She displays normal reflexes. A sensory deficit is present.   Decreased in b/l stocking distribution   Psychiatric: Her behavior is normal. Thought content normal.         Assessment/Plan   Diagnoses and all orders for this visit:    1. Pain (Primary)    2. Cervical disc disorder with radiculopathy    3. Fibromyositis    4. Leg pain, bilateral    5. Other long term (current) drug therapy    6. Neck pain        [facility] reviewed, in order, filled 12/31/20. Low risk. Repeat drug screen 1/6/20 in order.  Cont Oxycodone 10mg 1/2 - 1 tab TID prn, cannot tolerate Hydrocodone, failed Tramadol. May need to increase in future, but doing well for now.  Increased to Lyrica 100mg TID for worsening pain. More effective than Gabapentin, already taking antidepressants so no plans for beginning Cymbalta.  Cont RxAlt shingles cream prn.  Cont other meds as prescribed.  No plans for procedures or TENS with metastatic disease.  RTC 3 months for f/u.    ADD: Pain worsening with spreading metastases, increase to Oxycodone 10mg QID prn, refill early, Ins[facility] reviewed.

## 2021-01-13 ENCOUNTER — OFFICE VISIT (OUTPATIENT)
Dept: FAMILY MEDICINE CLINIC | Facility: CLINIC | Age: 66
End: 2021-01-13

## 2021-01-13 VITALS
DIASTOLIC BLOOD PRESSURE: 75 MMHG | OXYGEN SATURATION: 95 % | TEMPERATURE: 96.9 F | WEIGHT: 175 LBS | BODY MASS INDEX: 29.12 KG/M2 | HEART RATE: 76 BPM | SYSTOLIC BLOOD PRESSURE: 142 MMHG

## 2021-01-13 DIAGNOSIS — J18.9 PNEUMONIA OF RIGHT LUNG DUE TO INFECTIOUS ORGANISM, UNSPECIFIED PART OF LUNG: Primary | ICD-10-CM

## 2021-01-13 PROCEDURE — 99213 OFFICE O/P EST LOW 20 MIN: CPT | Performed by: FAMILY MEDICINE

## 2021-01-13 RX ORDER — BENZONATATE 200 MG/1
200 CAPSULE ORAL 3 TIMES DAILY PRN
Qty: 30 CAPSULE | Refills: 0 | Status: SHIPPED | OUTPATIENT
Start: 2021-01-13 | End: 2021-02-01

## 2021-01-13 RX ORDER — ALBUTEROL SULFATE 90 UG/1
2 AEROSOL, METERED RESPIRATORY (INHALATION) EVERY 4 HOURS PRN
Qty: 18 G | Refills: 0 | Status: SHIPPED | OUTPATIENT
Start: 2021-01-13 | End: 2021-01-01

## 2021-01-13 NOTE — PROGRESS NOTES
Chief Complaint  Follow-up (PNA, thinks it has not gone away)    Subjective          Tahira Zimmerman presents to [facility] for   She had pneumonia about a month ago.    Cough  This is a new problem. The current episode started more than 1 month ago. The problem has been unchanged. The problem occurs every few hours. The cough is productive of sputum. Associated symptoms include shortness of breath and wheezing.       Objective   Vital Signs:   /75 (BP Location: Right arm, Patient Position: Sitting)   Pulse 76   Temp 96.9 °F (36.1 °C)   Wt 79.4 kg (175 lb)   SpO2 95%   BMI 29.12 kg/m²     Physical Exam  Vitals signs and nursing note reviewed.   Constitutional:       Appearance: Normal appearance.   HENT:      Head: Normocephalic and atraumatic.   Neck:      Musculoskeletal: Normal range of motion.   Cardiovascular:      Rate and Rhythm: Normal rate and regular rhythm.      Heart sounds: Normal heart sounds.   Pulmonary:      Effort: Pulmonary effort is normal.      Breath sounds: Rhonchi present.   Neurological:      Mental Status: She is alert.        Result Review :                 Assessment and Plan    Problem List Items Addressed This Visit     None      Visit Diagnoses     Pneumonia of right lung due to infectious organism, unspecified part of lung    -  Primary    Relevant Medications    albuterol sulfate  (90 Base) MCG/ACT inhaler    benzonatate (TESSALON) 200 MG capsule    Other Relevant Orders    XR Chest 2 View (Completed)          Follow Up   No follow-ups on file.  Patient was given instructions and counseling regarding her condition or for health maintenance advice. Please see specific information pulled into the AVS if appropriate.

## 2021-01-14 ENCOUNTER — LAB (OUTPATIENT)
Dept: LAB | Facility: HOSPITAL | Age: 66
End: 2021-01-14

## 2021-01-14 ENCOUNTER — TELEPHONE (OUTPATIENT)
Dept: ONCOLOGY | Facility: HOSPITAL | Age: 66
End: 2021-01-14

## 2021-01-14 ENCOUNTER — ANTICOAGULATION VISIT (OUTPATIENT)
Dept: ONCOLOGY | Facility: HOSPITAL | Age: 66
End: 2021-01-14

## 2021-01-14 ENCOUNTER — OFFICE VISIT (OUTPATIENT)
Dept: ONCOLOGY | Facility: CLINIC | Age: 66
End: 2021-01-14

## 2021-01-14 ENCOUNTER — HOSPITAL ENCOUNTER (OUTPATIENT)
Dept: ONCOLOGY | Facility: HOSPITAL | Age: 66
Setting detail: INFUSION SERIES
Discharge: HOME | End: 2021-01-14

## 2021-01-14 VITALS
SYSTOLIC BLOOD PRESSURE: 132 MMHG | HEIGHT: 65 IN | TEMPERATURE: 97.1 F | DIASTOLIC BLOOD PRESSURE: 76 MMHG | WEIGHT: 176.6 LBS | RESPIRATION RATE: 16 BRPM | BODY MASS INDEX: 29.42 KG/M2 | HEART RATE: 86 BPM

## 2021-01-14 DIAGNOSIS — Z79.01 LONG TERM (CURRENT) USE OF ANTICOAGULANTS: ICD-10-CM

## 2021-01-14 DIAGNOSIS — K90.9 MALABSORPTION OF IRON: ICD-10-CM

## 2021-01-14 DIAGNOSIS — K90.9 MALABSORPTION OF IRON: Primary | ICD-10-CM

## 2021-01-14 DIAGNOSIS — C56.1 MALIGNANT NEOPLASM OF RIGHT OVARY (HCC): ICD-10-CM

## 2021-01-14 DIAGNOSIS — D51.0 PERNICIOUS ANEMIA: Primary | ICD-10-CM

## 2021-01-14 LAB
BASOPHILS # BLD AUTO: 0.01 10*3/MM3 (ref 0–0.2)
BASOPHILS NFR BLD AUTO: 0.2 % (ref 0–1.5)
CANCER AG125 SERPL QL: 96.7 U/ML (ref 0–38.1)
DEPRECATED RDW RBC AUTO: 50.9 FL (ref 37–54)
EOSINOPHIL # BLD AUTO: 0.1 10*3/MM3 (ref 0–0.4)
EOSINOPHIL NFR BLD AUTO: 1.9 % (ref 0.3–6.2)
ERYTHROCYTE [DISTWIDTH] IN BLOOD BY AUTOMATED COUNT: 16.3 % (ref 12.3–15.4)
HCT VFR BLD AUTO: 33.6 % (ref 34–46.6)
HGB BLD-MCNC: 10.5 G/DL (ref 12–15.9)
INR PPP: 1.6
LYMPHOCYTES # BLD AUTO: 1.32 10*3/MM3 (ref 0.7–3.1)
LYMPHOCYTES NFR BLD AUTO: 25.3 % (ref 19.6–45.3)
MCH RBC QN AUTO: 28.6 PG (ref 26.6–33)
MCHC RBC AUTO-ENTMCNC: 31.3 G/DL (ref 31.5–35.7)
MCV RBC AUTO: 91.6 FL (ref 79–97)
MONOCYTES # BLD AUTO: 0.77 10*3/MM3 (ref 0.1–0.9)
MONOCYTES NFR BLD AUTO: 14.8 % (ref 5–12)
NEUTROPHILS NFR BLD AUTO: 3.01 10*3/MM3 (ref 1.7–7)
NEUTROPHILS NFR BLD AUTO: 57.8 % (ref 42.7–76)
PLATELET # BLD AUTO: 108 10*3/MM3 (ref 140–450)
PMV BLD AUTO: 10.6 FL (ref 6–12)
RBC # BLD AUTO: 3.67 10*6/MM3 (ref 3.77–5.28)
WBC # BLD AUTO: 5.21 10*3/MM3 (ref 3.4–10.8)

## 2021-01-14 PROCEDURE — 96372 THER/PROPH/DIAG INJ SC/IM: CPT

## 2021-01-14 PROCEDURE — 85610 PROTHROMBIN TIME: CPT

## 2021-01-14 PROCEDURE — 86304 IMMUNOASSAY TUMOR CA 125: CPT | Performed by: INTERNAL MEDICINE

## 2021-01-14 PROCEDURE — 85025 COMPLETE CBC W/AUTO DIFF WBC: CPT

## 2021-01-14 PROCEDURE — 36415 COLL VENOUS BLD VENIPUNCTURE: CPT | Performed by: INTERNAL MEDICINE

## 2021-01-14 PROCEDURE — 25010000002 CYANOCOBALAMIN PER 1000 MCG: Performed by: NURSE PRACTITIONER

## 2021-01-14 PROCEDURE — 99215 OFFICE O/P EST HI 40 MIN: CPT | Performed by: INTERNAL MEDICINE

## 2021-01-14 RX ORDER — CYANOCOBALAMIN 1000 UG/ML
1000 INJECTION, SOLUTION INTRAMUSCULAR; SUBCUTANEOUS ONCE
Status: COMPLETED | OUTPATIENT
Start: 2021-01-14 | End: 2021-01-14

## 2021-01-14 RX ADMIN — CYANOCOBALAMIN 1000 MCG: 1000 INJECTION INTRAMUSCULAR; SUBCUTANEOUS at 13:30

## 2021-01-14 NOTE — TELEPHONE ENCOUNTER
I called pt to discuss her INR and coumadin dosing with no answer, voicemail was left for pt to return call to office.

## 2021-01-15 ENCOUNTER — TELEPHONE (OUTPATIENT)
Dept: ONCOLOGY | Facility: CLINIC | Age: 66
End: 2021-01-15

## 2021-01-15 NOTE — TELEPHONE ENCOUNTER
Bone scan scheduled for Monday 1- at 10:00AM.  Attempted to contact patient but had to V asking her to call back.

## 2021-01-18 ENCOUNTER — HOSPITAL ENCOUNTER (OUTPATIENT)
Dept: NUCLEAR MEDICINE | Facility: HOSPITAL | Age: 66
Discharge: HOME | End: 2021-01-18

## 2021-01-18 DIAGNOSIS — C56.1 MALIGNANT NEOPLASM OF RIGHT OVARY (HCC): ICD-10-CM

## 2021-01-18 PROCEDURE — 78306 BONE IMAGING WHOLE BODY: CPT

## 2021-01-18 PROCEDURE — 0 TECHNETIUM MEDRONATE KIT: Performed by: INTERNAL MEDICINE

## 2021-01-18 PROCEDURE — A9503 TC99M MEDRONATE: HCPCS | Performed by: INTERNAL MEDICINE

## 2021-01-18 RX ORDER — TC 99M MEDRONATE 20 MG/10ML
26.1 INJECTION, POWDER, LYOPHILIZED, FOR SOLUTION INTRAVENOUS
Status: COMPLETED | OUTPATIENT
Start: 2021-01-18 | End: 2021-01-18

## 2021-01-18 RX ADMIN — TC 99M MEDRONATE 26.1 MILLICURIE: 20 INJECTION, POWDER, LYOPHILIZED, FOR SOLUTION INTRAVENOUS at 10:12

## 2021-01-19 DIAGNOSIS — C56.1 MALIGNANT NEOPLASM OF RIGHT OVARY (HCC): Primary | ICD-10-CM

## 2021-01-19 RX ORDER — SODIUM CHLORIDE 9 MG/ML
250 INJECTION, SOLUTION INTRAVENOUS ONCE
Status: CANCELLED | OUTPATIENT
Start: 2021-02-04

## 2021-01-20 ENCOUNTER — TELEPHONE (OUTPATIENT)
Dept: ONCOLOGY | Facility: HOSPITAL | Age: 66
End: 2021-01-20

## 2021-01-20 NOTE — TELEPHONE ENCOUNTER
Pt was scheduled today for Alimta and Injectafer.  Pt did not show.  Attempted to call pt and had to leave a vm for pt to call back to rescheduled.

## 2021-01-21 NOTE — PROGRESS NOTES
PATIENTS ONCOLOGY RECORD LOCATED IN [facility]      Subjective     Name:  EDE ROBLERO     Date:  10/15/2018  Address:  [facility] [place] [id number]  Home:   :  1955 AGE:  63 y.o.        RECORDS OBTAINED:  Patients Oncology Record is located in [facility]   30

## 2021-01-25 ENCOUNTER — TELEPHONE (OUTPATIENT)
Dept: RADIATION ONCOLOGY | Facility: HOSPITAL | Age: 66
End: 2021-01-25

## 2021-01-25 ENCOUNTER — TELEPHONE (OUTPATIENT)
Dept: ONCOLOGY | Facility: CLINIC | Age: 66
End: 2021-01-25

## 2021-01-25 ENCOUNTER — PATIENT MESSAGE (OUTPATIENT)
Dept: PAIN MEDICINE | Facility: CLINIC | Age: 66
End: 2021-01-25

## 2021-01-25 DIAGNOSIS — R52 PAIN: ICD-10-CM

## 2021-01-25 PROBLEM — C79.51 BONE METASTASES: Status: ACTIVE | Noted: 2021-01-25

## 2021-01-25 RX ORDER — OXYCODONE HYDROCHLORIDE 10 MG/1
10 TABLET ORAL 4 TIMES DAILY PRN
Qty: 120 TABLET | Refills: 0 | Status: SHIPPED | OUTPATIENT
Start: 2021-01-25 | End: 2021-01-01 | Stop reason: SDUPTHER

## 2021-01-25 NOTE — TELEPHONE ENCOUNTER
Attempted to call pt again regarding bone metastases and Xgeva. No answer or identifying VM. Callback number left.

## 2021-01-25 NOTE — TELEPHONE ENCOUNTER
Attempted to call pt regarding bone scan and starting Xgeva. No answer or identifying VM. Callback number left.

## 2021-01-25 NOTE — TELEPHONE ENCOUNTER
----- Message from Dorothy Lee RN sent at 1/25/2021  8:42 AM EST -----    ----- Message -----  From: Cindy Ball MD  Sent: 1/24/2021   3:18 PM EST  To: Dorothy Lee RN    Please let patient know that she does have evidence of bone metastases on her bone scan therefore would like to add Xgeva to her treatments.  This will help to reduce risk of fractures.  Go ahead and get this authorized and add to her  care plan.  Xgeva 120 mg subcu monthly.  She has an appointment to see me 1 February 2021.  I will discuss the drug then and we can begin treatment after that.

## 2021-01-26 ENCOUNTER — HOSPITAL ENCOUNTER (OUTPATIENT)
Dept: MRI IMAGING | Facility: HOSPITAL | Age: 66
Discharge: HOME | End: 2021-01-26

## 2021-01-26 ENCOUNTER — HOSPITAL ENCOUNTER (OUTPATIENT)
Dept: GENERAL RADIOLOGY | Facility: HOSPITAL | Age: 66
Discharge: HOME | End: 2021-01-26

## 2021-01-26 ENCOUNTER — TELEPHONE (OUTPATIENT)
Dept: ONCOLOGY | Facility: CLINIC | Age: 66
End: 2021-01-26

## 2021-01-26 DIAGNOSIS — C79.31 BRAIN METASTASES: ICD-10-CM

## 2021-01-26 PROCEDURE — 70553 MRI BRAIN STEM W/O & W/DYE: CPT

## 2021-01-26 PROCEDURE — 71046 X-RAY EXAM CHEST 2 VIEWS: CPT

## 2021-01-26 PROCEDURE — A9579 GAD-BASE MR CONTRAST NOS,1ML: HCPCS | Performed by: RADIOLOGY

## 2021-01-26 PROCEDURE — 25010000002 GADOTERIDOL PER 1 ML: Performed by: RADIOLOGY

## 2021-01-26 RX ADMIN — GADOTERIDOL 15 ML: 279.3 INJECTION, SOLUTION INTRAVENOUS at 11:06

## 2021-01-26 NOTE — TELEPHONE ENCOUNTER
Caller: EDE ROBLERO    Relationship to patient: SELF    Best call back number: [phone]    STATES SHE MISSED A CALL FROM [facility] AND ASKS THAT SHE CALL BACK WHEN POSSIBLE

## 2021-01-27 ENCOUNTER — TELEPHONE (OUTPATIENT)
Dept: ONCOLOGY | Facility: CLINIC | Age: 66
End: 2021-01-27

## 2021-01-27 ENCOUNTER — OFFICE VISIT (OUTPATIENT)
Dept: RADIATION ONCOLOGY | Facility: HOSPITAL | Age: 66
End: 2021-01-27

## 2021-01-27 ENCOUNTER — PATIENT MESSAGE (OUTPATIENT)
Dept: FAMILY MEDICINE CLINIC | Facility: CLINIC | Age: 66
End: 2021-01-27

## 2021-01-27 ENCOUNTER — DOCUMENTATION (OUTPATIENT)
Dept: ONCOLOGY | Facility: CLINIC | Age: 66
End: 2021-01-27

## 2021-01-27 VITALS
DIASTOLIC BLOOD PRESSURE: 80 MMHG | OXYGEN SATURATION: 98 % | TEMPERATURE: 96.8 F | SYSTOLIC BLOOD PRESSURE: 143 MMHG | BODY MASS INDEX: 29.89 KG/M2 | WEIGHT: 179.4 LBS | HEIGHT: 65 IN | HEART RATE: 67 BPM

## 2021-01-27 DIAGNOSIS — C79.31 BRAIN METASTASES: Primary | ICD-10-CM

## 2021-01-27 DIAGNOSIS — J18.9 PNEUMONIA OF RIGHT LUNG DUE TO INFECTIOUS ORGANISM, UNSPECIFIED PART OF LUNG: Primary | ICD-10-CM

## 2021-01-27 PROCEDURE — 99024 POSTOP FOLLOW-UP VISIT: CPT | Performed by: RADIOLOGY

## 2021-01-27 NOTE — TELEPHONE ENCOUNTER
Notified pt that bone scan shows evidence of bone metastasis and that she will be started on Xgeva tomorrow. Pt verbalized understanding. No questions at this time.

## 2021-01-27 NOTE — TELEPHONE ENCOUNTER
Attempted to call pt again to notify her of bone scan results and that Xgeva has been ordered. No answer or identifying VM. Callback number left.

## 2021-01-29 RX ORDER — WARFARIN SODIUM 5 MG/1
5 TABLET ORAL
Qty: 30 TABLET | Refills: 0 | Status: SHIPPED | OUTPATIENT
Start: 2021-01-29 | End: 2021-01-01 | Stop reason: SDUPTHER

## 2021-02-01 ENCOUNTER — ANTICOAGULATION VISIT (OUTPATIENT)
Dept: ONCOLOGY | Facility: HOSPITAL | Age: 66
End: 2021-02-01

## 2021-02-01 ENCOUNTER — LAB (OUTPATIENT)
Dept: LAB | Facility: HOSPITAL | Age: 66
End: 2021-02-01

## 2021-02-01 ENCOUNTER — OFFICE VISIT (OUTPATIENT)
Dept: ONCOLOGY | Facility: CLINIC | Age: 66
End: 2021-02-01

## 2021-02-01 VITALS
HEART RATE: 60 BPM | WEIGHT: 178.8 LBS | RESPIRATION RATE: 16 BRPM | HEIGHT: 65 IN | TEMPERATURE: 96.8 F | BODY MASS INDEX: 29.79 KG/M2 | SYSTOLIC BLOOD PRESSURE: 144 MMHG | DIASTOLIC BLOOD PRESSURE: 83 MMHG

## 2021-02-01 DIAGNOSIS — C56.1 MALIGNANT NEOPLASM OF RIGHT OVARY (HCC): Primary | ICD-10-CM

## 2021-02-01 DIAGNOSIS — C56.1 MALIGNANT NEOPLASM OF RIGHT OVARY (HCC): ICD-10-CM

## 2021-02-01 DIAGNOSIS — Z79.01 LONG TERM (CURRENT) USE OF ANTICOAGULANTS: ICD-10-CM

## 2021-02-01 LAB
BASOPHILS # BLD AUTO: 0.03 10*3/MM3 (ref 0–0.2)
BASOPHILS NFR BLD AUTO: 0.6 % (ref 0–1.5)
DEPRECATED RDW RBC AUTO: 59.1 FL (ref 37–54)
EOSINOPHIL # BLD AUTO: 0.07 10*3/MM3 (ref 0–0.4)
EOSINOPHIL NFR BLD AUTO: 1.4 % (ref 0.3–6.2)
ERYTHROCYTE [DISTWIDTH] IN BLOOD BY AUTOMATED COUNT: 18.4 % (ref 12.3–15.4)
HCT VFR BLD AUTO: 35.1 % (ref 34–46.6)
HGB BLD-MCNC: 10.9 G/DL (ref 12–15.9)
INR PPP: 3.8
LYMPHOCYTES # BLD AUTO: 0.98 10*3/MM3 (ref 0.7–3.1)
LYMPHOCYTES NFR BLD AUTO: 18.9 % (ref 19.6–45.3)
MCH RBC QN AUTO: 28.3 PG (ref 26.6–33)
MCHC RBC AUTO-ENTMCNC: 31.1 G/DL (ref 31.5–35.7)
MCV RBC AUTO: 91.2 FL (ref 79–97)
MONOCYTES # BLD AUTO: 0.85 10*3/MM3 (ref 0.1–0.9)
MONOCYTES NFR BLD AUTO: 16.4 % (ref 5–12)
NEUTROPHILS NFR BLD AUTO: 3.25 10*3/MM3 (ref 1.7–7)
NEUTROPHILS NFR BLD AUTO: 62.7 % (ref 42.7–76)
PLATELET # BLD AUTO: 218 10*3/MM3 (ref 140–450)
PMV BLD AUTO: 11.5 FL (ref 6–12)
RBC # BLD AUTO: 3.85 10*6/MM3 (ref 3.77–5.28)
WBC # BLD AUTO: 5.18 10*3/MM3 (ref 3.4–10.8)

## 2021-02-01 PROCEDURE — 85025 COMPLETE CBC W/AUTO DIFF WBC: CPT

## 2021-02-01 PROCEDURE — 99215 OFFICE O/P EST HI 40 MIN: CPT | Performed by: INTERNAL MEDICINE

## 2021-02-01 PROCEDURE — 85610 PROTHROMBIN TIME: CPT

## 2021-02-01 RX ORDER — PHENOL 1.4 %
600 AEROSOL, SPRAY (ML) MUCOUS MEMBRANE 2 TIMES DAILY
Qty: 60 TABLET | Refills: 11 | Status: SHIPPED | OUTPATIENT
Start: 2021-02-01 | End: 2021-01-01

## 2021-02-02 ENCOUNTER — APPOINTMENT (OUTPATIENT)
Dept: LAB | Facility: HOSPITAL | Age: 66
End: 2021-02-02

## 2021-02-04 ENCOUNTER — HOSPITAL ENCOUNTER (OUTPATIENT)
Dept: ONCOLOGY | Facility: HOSPITAL | Age: 66
Setting detail: INFUSION SERIES
Discharge: HOME | End: 2021-02-04

## 2021-02-04 ENCOUNTER — LAB (OUTPATIENT)
Dept: LAB | Facility: HOSPITAL | Age: 66
End: 2021-02-04

## 2021-02-04 ENCOUNTER — ANTICOAGULATION VISIT (OUTPATIENT)
Dept: ONCOLOGY | Facility: HOSPITAL | Age: 66
End: 2021-02-04

## 2021-02-04 VITALS
WEIGHT: 180 LBS | HEART RATE: 59 BPM | BODY MASS INDEX: 29.99 KG/M2 | RESPIRATION RATE: 18 BRPM | DIASTOLIC BLOOD PRESSURE: 80 MMHG | SYSTOLIC BLOOD PRESSURE: 133 MMHG | HEIGHT: 65 IN | TEMPERATURE: 97.8 F

## 2021-02-04 DIAGNOSIS — C56.1 MALIGNANT NEOPLASM OF RIGHT OVARY (HCC): ICD-10-CM

## 2021-02-04 DIAGNOSIS — Z95.828 PORT-A-CATH IN PLACE: Primary | ICD-10-CM

## 2021-02-04 DIAGNOSIS — C79.51 BONE METASTASES: ICD-10-CM

## 2021-02-04 DIAGNOSIS — Z79.01 LONG TERM (CURRENT) USE OF ANTICOAGULANTS: ICD-10-CM

## 2021-02-04 LAB
ALBUMIN SERPL-MCNC: 3.7 G/DL (ref 3.5–5.2)
ALBUMIN/GLOB SERPL: 1.1 G/DL
ALP SERPL-CCNC: 167 U/L (ref 39–117)
ALT SERPL W P-5'-P-CCNC: 11 U/L (ref 1–33)
ANION GAP SERPL CALCULATED.3IONS-SCNC: 14 MMOL/L (ref 5–15)
AST SERPL-CCNC: 21 U/L (ref 1–32)
BASOPHILS # BLD AUTO: 0.01 10*3/MM3 (ref 0–0.2)
BASOPHILS NFR BLD AUTO: 0.1 % (ref 0–1.5)
BILIRUB SERPL-MCNC: 0.2 MG/DL (ref 0–1.2)
BUN SERPL-MCNC: 20 MG/DL (ref 8–23)
BUN/CREAT SERPL: 24.1 (ref 7–25)
CALCIUM SPEC-SCNC: 9.5 MG/DL (ref 8.6–10.5)
CHLORIDE SERPL-SCNC: 102 MMOL/L (ref 98–107)
CO2 SERPL-SCNC: 22 MMOL/L (ref 22–29)
CREAT SERPL-MCNC: 0.83 MG/DL (ref 0.57–1)
DEPRECATED RDW RBC AUTO: 60.2 FL (ref 37–54)
EOSINOPHIL # BLD AUTO: 0 10*3/MM3 (ref 0–0.4)
EOSINOPHIL NFR BLD AUTO: 0 % (ref 0.3–6.2)
ERYTHROCYTE [DISTWIDTH] IN BLOOD BY AUTOMATED COUNT: 18.7 % (ref 12.3–15.4)
GFR SERPL CREATININE-BSD FRML MDRD: 69 ML/MIN/1.73
GLOBULIN UR ELPH-MCNC: 3.4 GM/DL
GLUCOSE SERPL-MCNC: 160 MG/DL (ref 65–99)
HCT VFR BLD AUTO: 34 % (ref 34–46.6)
HGB BLD-MCNC: 10.5 G/DL (ref 12–15.9)
INR PPP: 1.7
LYMPHOCYTES # BLD AUTO: 0.8 10*3/MM3 (ref 0.7–3.1)
LYMPHOCYTES NFR BLD AUTO: 9.7 % (ref 19.6–45.3)
MAGNESIUM SERPL-MCNC: 1.5 MG/DL (ref 1.6–2.4)
MCH RBC QN AUTO: 28.5 PG (ref 26.6–33)
MCHC RBC AUTO-ENTMCNC: 30.9 G/DL (ref 31.5–35.7)
MCV RBC AUTO: 92.4 FL (ref 79–97)
MONOCYTES # BLD AUTO: 0.74 10*3/MM3 (ref 0.1–0.9)
MONOCYTES NFR BLD AUTO: 9 % (ref 5–12)
NEUTROPHILS NFR BLD AUTO: 6.66 10*3/MM3 (ref 1.7–7)
NEUTROPHILS NFR BLD AUTO: 81.2 % (ref 42.7–76)
PHOSPHATE SERPL-MCNC: 2.5 MG/DL (ref 2.5–4.5)
PLATELET # BLD AUTO: 256 10*3/MM3 (ref 140–450)
PMV BLD AUTO: 10.6 FL (ref 6–12)
POTASSIUM SERPL-SCNC: 4.2 MMOL/L (ref 3.5–5.2)
PROT SERPL-MCNC: 7.1 G/DL (ref 6–8.5)
RBC # BLD AUTO: 3.68 10*6/MM3 (ref 3.77–5.28)
SODIUM SERPL-SCNC: 138 MMOL/L (ref 136–145)
WBC # BLD AUTO: 8.21 10*3/MM3 (ref 3.4–10.8)

## 2021-02-04 PROCEDURE — 25010000003 HEPARIN LOCK FLUSH PER 10 UNITS: Performed by: INTERNAL MEDICINE

## 2021-02-04 PROCEDURE — 84100 ASSAY OF PHOSPHORUS: CPT | Performed by: INTERNAL MEDICINE

## 2021-02-04 PROCEDURE — 85610 PROTHROMBIN TIME: CPT

## 2021-02-04 PROCEDURE — 83735 ASSAY OF MAGNESIUM: CPT | Performed by: INTERNAL MEDICINE

## 2021-02-04 PROCEDURE — 80053 COMPREHEN METABOLIC PANEL: CPT | Performed by: INTERNAL MEDICINE

## 2021-02-04 PROCEDURE — 96413 CHEMO IV INFUSION 1 HR: CPT

## 2021-02-04 PROCEDURE — 96372 THER/PROPH/DIAG INJ SC/IM: CPT

## 2021-02-04 PROCEDURE — 85025 COMPLETE CBC W/AUTO DIFF WBC: CPT | Performed by: INTERNAL MEDICINE

## 2021-02-04 PROCEDURE — 25010000002 DENOSUMAB 120 MG/1.7ML SOLUTION: Performed by: INTERNAL MEDICINE

## 2021-02-04 PROCEDURE — 25010000002 PEMETREXED PER 10 MG: Performed by: INTERNAL MEDICINE

## 2021-02-04 PROCEDURE — 36591 DRAW BLOOD OFF VENOUS DEVICE: CPT

## 2021-02-04 RX ORDER — HEPARIN SODIUM (PORCINE) LOCK FLUSH IV SOLN 100 UNIT/ML 100 UNIT/ML
500 SOLUTION INTRAVENOUS AS NEEDED
Status: CANCELLED | OUTPATIENT
Start: 2021-02-04

## 2021-02-04 RX ORDER — SODIUM CHLORIDE 0.9 % (FLUSH) 0.9 %
20 SYRINGE (ML) INJECTION AS NEEDED
Status: CANCELLED | OUTPATIENT
Start: 2021-02-04

## 2021-02-04 RX ORDER — SODIUM CHLORIDE 0.9 % (FLUSH) 0.9 %
20 SYRINGE (ML) INJECTION AS NEEDED
Status: DISCONTINUED | OUTPATIENT
Start: 2021-02-04 | End: 2021-02-05 | Stop reason: HOSPADM

## 2021-02-04 RX ORDER — SODIUM CHLORIDE 9 MG/ML
250 INJECTION, SOLUTION INTRAVENOUS ONCE
Status: COMPLETED | OUTPATIENT
Start: 2021-02-04 | End: 2021-02-04

## 2021-02-04 RX ORDER — HEPARIN SODIUM (PORCINE) LOCK FLUSH IV SOLN 100 UNIT/ML 100 UNIT/ML
500 SOLUTION INTRAVENOUS AS NEEDED
Status: DISCONTINUED | OUTPATIENT
Start: 2021-02-04 | End: 2021-02-05 | Stop reason: HOSPADM

## 2021-02-04 RX ADMIN — SODIUM CHLORIDE 250 ML: 9 INJECTION, SOLUTION INTRAVENOUS at 09:25

## 2021-02-04 RX ADMIN — DENOSUMAB 120 MG: 120 INJECTION SUBCUTANEOUS at 09:41

## 2021-02-04 RX ADMIN — SODIUM CHLORIDE 950 MG: 900 INJECTION, SOLUTION INTRAVENOUS at 09:25

## 2021-02-04 RX ADMIN — Medication 20 ML: at 09:46

## 2021-02-04 RX ADMIN — HEPARIN 500 UNITS: 100 SYRINGE at 09:46

## 2021-02-04 NOTE — PROGRESS NOTES
Patient's mag came back after she left the center. Mag result is 1.5 sent message to Dr. Ball to see if she wanted mag infusion and she advised no mag infusion at this time.

## 2021-02-05 ENCOUNTER — HOSPITAL ENCOUNTER (OUTPATIENT)
Dept: CT IMAGING | Facility: HOSPITAL | Age: 66
Discharge: HOME | End: 2021-02-05
Admitting: FAMILY MEDICINE

## 2021-02-05 DIAGNOSIS — J18.9 PNEUMONIA OF RIGHT LUNG DUE TO INFECTIOUS ORGANISM, UNSPECIFIED PART OF LUNG: ICD-10-CM

## 2021-02-05 PROCEDURE — 71250 CT THORAX DX C-: CPT

## 2021-02-09 ENCOUNTER — HOSPITAL ENCOUNTER (INPATIENT)
Facility: HOSPITAL | Age: 66
LOS: 3 days | Discharge: HOME | End: 2021-02-12
Attending: EMERGENCY MEDICINE | Admitting: INTERNAL MEDICINE

## 2021-02-09 ENCOUNTER — APPOINTMENT (OUTPATIENT)
Dept: GENERAL RADIOLOGY | Facility: HOSPITAL | Age: 66
End: 2021-02-09

## 2021-02-09 ENCOUNTER — PATIENT MESSAGE (OUTPATIENT)
Dept: FAMILY MEDICINE CLINIC | Facility: CLINIC | Age: 66
End: 2021-02-09

## 2021-02-09 DIAGNOSIS — G25.81 RESTLESS LEGS SYNDROME: ICD-10-CM

## 2021-02-09 DIAGNOSIS — J98.01 BRONCHOSPASM: ICD-10-CM

## 2021-02-09 DIAGNOSIS — R00.2 RAPID PALPITATIONS: ICD-10-CM

## 2021-02-09 DIAGNOSIS — C56.9 MALIGNANT NEOPLASM OF OVARY, UNSPECIFIED LATERALITY (HCC): ICD-10-CM

## 2021-02-09 DIAGNOSIS — J18.9 PNEUMONIA OF RIGHT LOWER LOBE DUE TO INFECTIOUS ORGANISM: Primary | ICD-10-CM

## 2021-02-09 LAB
ANION GAP SERPL CALCULATED.3IONS-SCNC: 11 MMOL/L (ref 5–15)
B PARAPERT DNA SPEC QL NAA+PROBE: NOT DETECTED
B PERT DNA SPEC QL NAA+PROBE: NOT DETECTED
BASOPHILS # BLD AUTO: 0 10*3/MM3 (ref 0–0.2)
BASOPHILS NFR BLD AUTO: 0.2 % (ref 0–1.5)
BUN SERPL-MCNC: 24 MG/DL (ref 8–23)
BUN/CREAT SERPL: 24 (ref 7–25)
C PNEUM DNA NPH QL NAA+NON-PROBE: NOT DETECTED
CALCIUM SPEC-SCNC: 8.7 MG/DL (ref 8.6–10.5)
CHLORIDE SERPL-SCNC: 99 MMOL/L (ref 98–107)
CO2 SERPL-SCNC: 25 MMOL/L (ref 22–29)
CREAT SERPL-MCNC: 1 MG/DL (ref 0.57–1)
D DIMER PPP FEU-MCNC: 0.54 MG/L (FEU) (ref 0–0.59)
DEPRECATED RDW RBC AUTO: 57.3 FL (ref 37–54)
EOSINOPHIL # BLD AUTO: 0 10*3/MM3 (ref 0–0.4)
EOSINOPHIL NFR BLD AUTO: 0.4 % (ref 0.3–6.2)
ERYTHROCYTE [DISTWIDTH] IN BLOOD BY AUTOMATED COUNT: 18.9 % (ref 12.3–15.4)
FLUAV SUBTYP SPEC NAA+PROBE: NOT DETECTED
FLUBV RNA ISLT QL NAA+PROBE: NOT DETECTED
GFR SERPL CREATININE-BSD FRML MDRD: 56 ML/MIN/1.73
GLUCOSE SERPL-MCNC: 107 MG/DL (ref 65–99)
HADV DNA SPEC NAA+PROBE: NOT DETECTED
HCOV 229E RNA SPEC QL NAA+PROBE: NOT DETECTED
HCOV HKU1 RNA SPEC QL NAA+PROBE: NOT DETECTED
HCOV NL63 RNA SPEC QL NAA+PROBE: NOT DETECTED
HCOV OC43 RNA SPEC QL NAA+PROBE: NOT DETECTED
HCT VFR BLD AUTO: 35 % (ref 34–46.6)
HGB BLD-MCNC: 11.6 G/DL (ref 12–15.9)
HMPV RNA NPH QL NAA+NON-PROBE: NOT DETECTED
HOLD SPECIMEN: NORMAL
HPIV1 RNA SPEC QL NAA+PROBE: NOT DETECTED
HPIV2 RNA SPEC QL NAA+PROBE: NOT DETECTED
HPIV3 RNA NPH QL NAA+PROBE: NOT DETECTED
HPIV4 P GENE NPH QL NAA+PROBE: NOT DETECTED
INR PPP: 2.01 (ref 2–3)
INR PPP: 2.08 (ref 0.93–1.1)
LYMPHOCYTES # BLD AUTO: 0.7 10*3/MM3 (ref 0.7–3.1)
LYMPHOCYTES NFR BLD AUTO: 9.4 % (ref 19.6–45.3)
M PNEUMO IGG SER IA-ACNC: NOT DETECTED
MCH RBC QN AUTO: 29 PG (ref 26.6–33)
MCHC RBC AUTO-ENTMCNC: 33.1 G/DL (ref 31.5–35.7)
MCV RBC AUTO: 87.6 FL (ref 79–97)
MONOCYTES # BLD AUTO: 0.1 10*3/MM3 (ref 0.1–0.9)
MONOCYTES NFR BLD AUTO: 1.4 % (ref 5–12)
NEUTROPHILS NFR BLD AUTO: 6.7 10*3/MM3 (ref 1.7–7)
NEUTROPHILS NFR BLD AUTO: 88.6 % (ref 42.7–76)
NRBC BLD AUTO-RTO: 0.1 /100 WBC (ref 0–0.2)
NT-PROBNP SERPL-MCNC: 83.4 PG/ML (ref 0–900)
PLATELET # BLD AUTO: 234 10*3/MM3 (ref 140–450)
PMV BLD AUTO: 8.7 FL (ref 6–12)
POTASSIUM SERPL-SCNC: 4 MMOL/L (ref 3.5–5.2)
PROCALCITONIN SERPL-MCNC: 0.17 NG/ML (ref 0–0.25)
PROTHROMBIN TIME: 21.4 SECONDS (ref 19.4–28.5)
PROTHROMBIN TIME: 22.1 SECONDS (ref 9.6–11.7)
RBC # BLD AUTO: 4 10*6/MM3 (ref 3.77–5.28)
RHINOVIRUS RNA SPEC NAA+PROBE: NOT DETECTED
RSV RNA NPH QL NAA+NON-PROBE: NOT DETECTED
SARS-COV-2 RNA NPH QL NAA+NON-PROBE: NOT DETECTED
SODIUM SERPL-SCNC: 135 MMOL/L (ref 136–145)
TROPONIN T SERPL-MCNC: <0.01 NG/ML (ref 0–0.03)
WBC # BLD AUTO: 7.6 10*3/MM3 (ref 3.4–10.8)
WHOLE BLOOD HOLD SPECIMEN: NORMAL

## 2021-02-09 PROCEDURE — 99284 EMERGENCY DEPT VISIT MOD MDM: CPT

## 2021-02-09 PROCEDURE — 25010000002 METHYLPREDNISOLONE PER 125 MG: Performed by: EMERGENCY MEDICINE

## 2021-02-09 PROCEDURE — 94799 UNLISTED PULMONARY SVC/PX: CPT

## 2021-02-09 PROCEDURE — 85379 FIBRIN DEGRADATION QUANT: CPT | Performed by: EMERGENCY MEDICINE

## 2021-02-09 PROCEDURE — 93005 ELECTROCARDIOGRAM TRACING: CPT | Performed by: EMERGENCY MEDICINE

## 2021-02-09 PROCEDURE — 87040 BLOOD CULTURE FOR BACTERIA: CPT | Performed by: EMERGENCY MEDICINE

## 2021-02-09 PROCEDURE — 85025 COMPLETE CBC W/AUTO DIFF WBC: CPT | Performed by: EMERGENCY MEDICINE

## 2021-02-09 PROCEDURE — 84145 PROCALCITONIN (PCT): CPT | Performed by: INTERNAL MEDICINE

## 2021-02-09 PROCEDURE — 86317 IMMUNOASSAY INFECTIOUS AGENT: CPT | Performed by: INTERNAL MEDICINE

## 2021-02-09 PROCEDURE — 80048 BASIC METABOLIC PNL TOTAL CA: CPT | Performed by: EMERGENCY MEDICINE

## 2021-02-09 PROCEDURE — 84484 ASSAY OF TROPONIN QUANT: CPT | Performed by: EMERGENCY MEDICINE

## 2021-02-09 PROCEDURE — 85610 PROTHROMBIN TIME: CPT | Performed by: INTERNAL MEDICINE

## 2021-02-09 PROCEDURE — 25010000002 CEFTRIAXONE PER 250 MG: Performed by: INTERNAL MEDICINE

## 2021-02-09 PROCEDURE — 85610 PROTHROMBIN TIME: CPT | Performed by: EMERGENCY MEDICINE

## 2021-02-09 PROCEDURE — 0202U NFCT DS 22 TRGT SARS-COV-2: CPT | Performed by: EMERGENCY MEDICINE

## 2021-02-09 PROCEDURE — 94640 AIRWAY INHALATION TREATMENT: CPT

## 2021-02-09 PROCEDURE — 99285 EMERGENCY DEPT VISIT HI MDM: CPT

## 2021-02-09 PROCEDURE — 71045 X-RAY EXAM CHEST 1 VIEW: CPT

## 2021-02-09 PROCEDURE — 99223 1ST HOSP IP/OBS HIGH 75: CPT | Performed by: INTERNAL MEDICINE

## 2021-02-09 PROCEDURE — 25010000002 VANCOMYCIN 10 G RECONSTITUTED SOLUTION: Performed by: INTERNAL MEDICINE

## 2021-02-09 PROCEDURE — 83880 ASSAY OF NATRIURETIC PEPTIDE: CPT | Performed by: EMERGENCY MEDICINE

## 2021-02-09 RX ORDER — ACETAMINOPHEN 160 MG/5ML
650 SOLUTION ORAL EVERY 4 HOURS PRN
Status: DISCONTINUED | OUTPATIENT
Start: 2021-02-09 | End: 2021-01-01 | Stop reason: HOSPADM

## 2021-02-09 RX ORDER — OXYCODONE HYDROCHLORIDE 5 MG/1
10 TABLET ORAL 4 TIMES DAILY PRN
Status: DISCONTINUED | OUTPATIENT
Start: 2021-02-09 | End: 2021-01-01 | Stop reason: HOSPADM

## 2021-02-09 RX ORDER — CALCIUM CARBONATE 200(500)MG
2 TABLET,CHEWABLE ORAL 2 TIMES DAILY PRN
Status: DISCONTINUED | OUTPATIENT
Start: 2021-02-09 | End: 2021-01-01 | Stop reason: HOSPADM

## 2021-02-09 RX ORDER — SODIUM CHLORIDE 0.9 % (FLUSH) 0.9 %
10 SYRINGE (ML) INJECTION AS NEEDED
Status: DISCONTINUED | OUTPATIENT
Start: 2021-02-09 | End: 2021-01-01 | Stop reason: HOSPADM

## 2021-02-09 RX ORDER — TRIAMTERENE AND HYDROCHLOROTHIAZIDE 37.5; 25 MG/1; MG/1
1 TABLET ORAL DAILY
Status: DISCONTINUED | OUTPATIENT
Start: 2021-01-01 | End: 2021-01-01 | Stop reason: HOSPADM

## 2021-02-09 RX ORDER — BISACODYL 10 MG
10 SUPPOSITORY, RECTAL RECTAL DAILY PRN
Status: DISCONTINUED | OUTPATIENT
Start: 2021-02-09 | End: 2021-01-01 | Stop reason: HOSPADM

## 2021-02-09 RX ORDER — TEMAZEPAM 15 MG/1
30 CAPSULE ORAL NIGHTLY PRN
Status: DISCONTINUED | OUTPATIENT
Start: 2021-02-09 | End: 2021-01-01 | Stop reason: HOSPADM

## 2021-02-09 RX ORDER — PANTOPRAZOLE SODIUM 40 MG/1
40 TABLET, DELAYED RELEASE ORAL
Status: DISCONTINUED | OUTPATIENT
Start: 2021-01-01 | End: 2021-01-01 | Stop reason: HOSPADM

## 2021-02-09 RX ORDER — ACETAMINOPHEN 650 MG/1
650 SUPPOSITORY RECTAL EVERY 4 HOURS PRN
Status: DISCONTINUED | OUTPATIENT
Start: 2021-02-09 | End: 2021-01-01 | Stop reason: HOSPADM

## 2021-02-09 RX ORDER — PREGABALIN 100 MG/1
100 CAPSULE ORAL 3 TIMES DAILY
Status: DISCONTINUED | OUTPATIENT
Start: 2021-02-09 | End: 2021-01-01 | Stop reason: HOSPADM

## 2021-02-09 RX ORDER — ACETAMINOPHEN 325 MG/1
650 TABLET ORAL EVERY 4 HOURS PRN
Status: DISCONTINUED | OUTPATIENT
Start: 2021-02-09 | End: 2021-01-01 | Stop reason: HOSPADM

## 2021-02-09 RX ORDER — AZELASTINE 1 MG/ML
2 SPRAY, METERED NASAL DAILY
Status: DISCONTINUED | OUTPATIENT
Start: 2021-01-01 | End: 2021-01-01 | Stop reason: HOSPADM

## 2021-02-09 RX ORDER — POTASSIUM CHLORIDE 20 MEQ/1
60 TABLET, EXTENDED RELEASE ORAL EVERY MORNING
COMMUNITY
End: 2021-01-01

## 2021-02-09 RX ORDER — ONDANSETRON 4 MG/1
4 TABLET, FILM COATED ORAL EVERY 6 HOURS PRN
Status: DISCONTINUED | OUTPATIENT
Start: 2021-02-09 | End: 2021-01-01 | Stop reason: HOSPADM

## 2021-02-09 RX ORDER — ONDANSETRON 2 MG/ML
4 INJECTION INTRAMUSCULAR; INTRAVENOUS EVERY 6 HOURS PRN
Status: DISCONTINUED | OUTPATIENT
Start: 2021-02-09 | End: 2021-01-01 | Stop reason: HOSPADM

## 2021-02-09 RX ORDER — ALBUTEROL SULFATE 90 UG/1
2 AEROSOL, METERED RESPIRATORY (INHALATION) EVERY 4 HOURS PRN
Status: DISCONTINUED | OUTPATIENT
Start: 2021-02-09 | End: 2021-01-01 | Stop reason: HOSPADM

## 2021-02-09 RX ORDER — NITROGLYCERIN 0.4 MG/1
0.4 TABLET SUBLINGUAL
Status: DISCONTINUED | OUTPATIENT
Start: 2021-02-09 | End: 2021-01-01 | Stop reason: HOSPADM

## 2021-02-09 RX ORDER — IPRATROPIUM BROMIDE AND ALBUTEROL SULFATE 2.5; .5 MG/3ML; MG/3ML
3 SOLUTION RESPIRATORY (INHALATION) ONCE
Status: COMPLETED | OUTPATIENT
Start: 2021-02-09 | End: 2021-02-09

## 2021-02-09 RX ORDER — ATORVASTATIN CALCIUM 20 MG/1
20 TABLET, FILM COATED ORAL NIGHTLY
Status: DISCONTINUED | OUTPATIENT
Start: 2021-02-09 | End: 2021-01-01 | Stop reason: HOSPADM

## 2021-02-09 RX ORDER — GUAIFENESIN/DEXTROMETHORPHAN 100-10MG/5
10 SYRUP ORAL EVERY 4 HOURS PRN
Status: DISCONTINUED | OUTPATIENT
Start: 2021-02-09 | End: 2021-01-01 | Stop reason: HOSPADM

## 2021-02-09 RX ORDER — BISACODYL 5 MG/1
5 TABLET, DELAYED RELEASE ORAL DAILY PRN
Status: DISCONTINUED | OUTPATIENT
Start: 2021-02-09 | End: 2021-01-01 | Stop reason: HOSPADM

## 2021-02-09 RX ORDER — DOXYCYCLINE 100 MG/1
100 TABLET ORAL ONCE
Status: COMPLETED | OUTPATIENT
Start: 2021-02-09 | End: 2021-02-09

## 2021-02-09 RX ORDER — WARFARIN SODIUM 5 MG/1
5 TABLET ORAL
Status: DISCONTINUED | OUTPATIENT
Start: 2021-02-09 | End: 2021-01-01

## 2021-02-09 RX ORDER — FOLIC ACID 1 MG/1
1 TABLET ORAL DAILY
Status: DISCONTINUED | OUTPATIENT
Start: 2021-01-01 | End: 2021-01-01 | Stop reason: HOSPADM

## 2021-02-09 RX ORDER — POTASSIUM CHLORIDE 20 MEQ/1
40 TABLET, EXTENDED RELEASE ORAL NIGHTLY
COMMUNITY
End: 2021-01-01 | Stop reason: SDUPTHER

## 2021-02-09 RX ORDER — CETIRIZINE HYDROCHLORIDE 10 MG/1
10 TABLET ORAL NIGHTLY
Status: DISCONTINUED | OUTPATIENT
Start: 2021-02-09 | End: 2021-01-01 | Stop reason: HOSPADM

## 2021-02-09 RX ORDER — CHOLECALCIFEROL (VITAMIN D3) 125 MCG
5 CAPSULE ORAL NIGHTLY PRN
Status: DISCONTINUED | OUTPATIENT
Start: 2021-02-09 | End: 2021-01-01 | Stop reason: HOSPADM

## 2021-02-09 RX ORDER — METHYLPREDNISOLONE SODIUM SUCCINATE 125 MG/2ML
125 INJECTION, POWDER, LYOPHILIZED, FOR SOLUTION INTRAMUSCULAR; INTRAVENOUS ONCE
Status: COMPLETED | OUTPATIENT
Start: 2021-02-09 | End: 2021-02-09

## 2021-02-09 RX ORDER — SODIUM CHLORIDE 0.9 % (FLUSH) 0.9 %
10 SYRINGE (ML) INJECTION EVERY 12 HOURS SCHEDULED
Status: DISCONTINUED | OUTPATIENT
Start: 2021-02-09 | End: 2021-01-01 | Stop reason: HOSPADM

## 2021-02-09 RX ADMIN — WARFARIN 5 MG: 5 TABLET ORAL at 21:20

## 2021-02-09 RX ADMIN — CEFTRIAXONE SODIUM 2 G: 2 INJECTION, POWDER, FOR SOLUTION INTRAMUSCULAR; INTRAVENOUS at 21:18

## 2021-02-09 RX ADMIN — IPRATROPIUM BROMIDE AND ALBUTEROL SULFATE 3 ML: 2.5; .5 SOLUTION RESPIRATORY (INHALATION) at 16:30

## 2021-02-09 RX ADMIN — PREGABALIN 100 MG: 100 CAPSULE ORAL at 21:20

## 2021-02-09 RX ADMIN — CETIRIZINE HYDROCHLORIDE 10 MG: 10 TABLET, FILM COATED ORAL at 21:20

## 2021-02-09 RX ADMIN — Medication 10 ML: at 21:21

## 2021-02-09 RX ADMIN — ATORVASTATIN CALCIUM 20 MG: 20 TABLET, FILM COATED ORAL at 21:20

## 2021-02-09 RX ADMIN — SERTRALINE HYDROCHLORIDE 50 MG: 50 TABLET ORAL at 21:19

## 2021-02-09 RX ADMIN — SODIUM CHLORIDE 1250 MG: 9 INJECTION, SOLUTION INTRAVENOUS at 21:19

## 2021-02-09 RX ADMIN — METHYLPREDNISOLONE SODIUM SUCCINATE 125 MG: 125 INJECTION, POWDER, FOR SOLUTION INTRAMUSCULAR; INTRAVENOUS at 16:54

## 2021-02-09 RX ADMIN — Medication 2 TABLET: at 21:19

## 2021-02-09 RX ADMIN — DOXYCYCLINE 100 MG: 100 TABLET, FILM COATED ORAL at 16:54

## 2021-02-09 RX ADMIN — MAGNESIUM GLUCONATE 500 MG ORAL TABLET 400 MG: 500 TABLET ORAL at 21:20

## 2021-02-09 RX ADMIN — GUAIFENESIN AND DEXTROMETHORPHAN 10 ML: 100; 10 SYRUP ORAL at 21:29

## 2021-02-10 NOTE — ANESTHESIA POSTPROCEDURE EVALUATION
Patient: Tahira Zimmerman    Procedure Summary     Date: 02/10/21 Room / Location: [facility] ENDOSCOPY 3 / [facility] ENDOSCOPY    Anesthesia Start: 1233 Anesthesia Stop: 1302    Procedure: BRONCHOSCOPY with bronchioalveolar lavage (N/A Bronchus) Diagnosis:       Pneumonia of right lower lobe due to infectious organism      (Pneumonia of right lower lobe due to infectious organism [J18.9])    Surgeon: Jerica Esparza MD Provider: Rei Moss MD    Anesthesia Type: MAC ASA Status: 3          Anesthesia Type: MAC    Vitals  Vitals Value Taken Time   /74 02/10/21 1326   Temp     Pulse 83 02/10/21 1328   Resp 16 02/10/21 1325   SpO2 94 % 02/10/21 1328   Vitals shown include unvalidated device data.        Post Anesthesia Care and Evaluation    Patient location during evaluation: PACU  Patient participation: complete - patient participated  Level of consciousness: awake  Pain scale: See nurse's notes for pain score.  Pain management: adequate  Airway patency: patent  Anesthetic complications: No anesthetic complications  PONV Status: none  Cardiovascular status: acceptable  Respiratory status: acceptable  Hydration status: acceptable    Comments: Patient seen and examined postoperatively; vital signs stable; SpO2 greater than or equal to 90%; cardiopulmonary status stable; nausea/vomiting adequately controlled; pain adequately controlled; no apparent anesthesia complications; patient discharged from anesthesia care when discharge criteria were met

## 2021-02-10 NOTE — TELEPHONE ENCOUNTER
Caller: PT    Relationship to patient: PT    Best call back number: [phone]    PT TO CANCEL 2/11 APPT DUE TO HOSPITALIZATION WILL RESCHED AT A LATER TIME.

## 2021-02-10 NOTE — ANESTHESIA PREPROCEDURE EVALUATION
Anesthesia Evaluation     Patient summary reviewed   NPO Solid Status: > 8 hours  NPO Liquid Status: > 8 hours           Airway   Mallampati: II  TM distance: >3 FB  Neck ROM: full  No difficulty expected  Dental - normal exam     Pulmonary - normal exam   (+) pneumonia ,   Cardiovascular - normal exam    (+) hypertension, DVT, hyperlipidemia,       Neuro/Psych  (+) headaches, numbness,     GI/Hepatic/Renal/Endo      Musculoskeletal     (+) myalgias, neck pain,   Abdominal  - normal exam    Bowel sounds: normal.   Substance History      OB/GYN          Other   arthritis,    history of cancer active    ROS/Med Hx Other: Metastatic ovarian ca                  Anesthesia Plan    ASA 3     MAC     intravenous induction     Anesthetic plan, all risks, benefits, and alternatives have been provided, discussed and informed consent has been obtained with: patient.

## 2021-02-12 PROBLEM — R09.81 NASAL CONGESTION WITH RHINORRHEA: Status: ACTIVE | Noted: 2021-01-01

## 2021-02-12 PROBLEM — J34.89 NASAL CONGESTION WITH RHINORRHEA: Status: ACTIVE | Noted: 2021-01-01

## 2021-02-13 NOTE — OUTREACH NOTE
Prep Survey      Responses   [facility] facility patient discharged from?  [facility]   Is LACE score < 7 ?  No   Emergency Room discharge w/ pulse ox?  No   Eligibility  TCM   Hospital  [facility]   Date of Admission  02/09/21   Date of Discharge  02/12/21   Discharge Disposition  Home or Self Care   Discharge diagnosis  recurrent ovarian CA with mets on chemo, anemia, SOA d/t acute bronchitis   Does the patient have one of the following disease processes/diagnoses(primary or secondary)?  Other   Does the patient have Home health ordered?  No   Is there a DME ordered?  No   Prep survey completed?  Yes          Karina Patel RN

## 2021-02-15 NOTE — OUTREACH NOTE
Call Center TCM Note      Responses   [facility] patient discharged from?  [facility]   Does the patient have one of the following disease processes/diagnoses(primary or secondary)?  Other   TCM attempt successful?  No   Unsuccessful attempts  Attempt 1          Carlee Yee LPN    2/15/2021, 08:18 EST

## 2021-02-15 NOTE — OUTREACH NOTE
Call Center TCM Note      Responses   [facility] patient discharged from?  [facility]   Does the patient have one of the following disease processes/diagnoses(primary or secondary)?  Other   TCM attempt successful?  No   Unsuccessful attempts  Attempt 2          Carlee Yee LPN    2/15/2021, 15:15 EST

## 2021-02-16 NOTE — OUTREACH NOTE
Call Center TCM Note      Responses   [facility] patient discharged from?  [facility]   Does the patient have one of the following disease processes/diagnoses(primary or secondary)?  Other   TCM attempt successful?  No   Unsuccessful attempts  Attempt 1          Amada Patel MA    2/16/2021, 12:19 EST

## 2021-02-19 NOTE — OUTREACH NOTE
Medical Week 2 Survey      Responses   [facility] [place] patient discharged from?  [facility]   Does the patient have one of the following disease processes/diagnoses(primary or secondary)?  Other   Week 2 attempt successful?  No   Unsuccessful attempts  Attempt 1          Mimi Xiong LPN

## 2021-02-22 NOTE — OUTREACH NOTE
Medical Week 2 Survey      Responses   [facility] [place] [facility] from?  [facility]   Does the patient have one of the following disease processes/diagnoses(primary or secondary)?  Other   Week 2 attempt successful?  Yes   Call start time  1547   Call end time  1550   Meds reviewed with patient/caregiver?  Yes   Is the patient having any side effects they believe may be caused by any medication additions or changes?  No   Does the patient have all medications ordered at discharge?  Yes   Is the patient taking all medications as directed (includes completed medication regime)?  Yes   Does the patient have a primary care provider?   Yes   Comments regarding PCP  PATIENT SAW HER PCP TODAY   Has the patient kept scheduled appointments due by today?  Yes   Has home health visited the patient within 72 hours of discharge?  N/A   Psychosocial issues?  No   Did the patient receive a copy of their discharge instructions?  Yes   Nursing interventions  Reviewed instructions with patient   What is the patient's perception of their health status since discharge?  Improving   Is the patient/caregiver able to teach back signs and symptoms related to disease process for when to call PCP?  Yes   Is the patient/caregiver able to teach back signs and symptoms related to disease process for when to call 911?  Yes   Is the patient/caregiver able to teach back the hierarchy of who to call/visit for symptoms/problems? PCP, Specialist, Home health nurse, Urgent Care, ED, 911  Yes   If the patient is a current smoker, are they able to teach back resources for cessation?  Not a smoker   Week 2 Call Completed?  Yes          Mimi Xiong LPN

## 2021-02-22 NOTE — PROGRESS NOTES
Transitional Care Follow Up Visit  Subjective     Tahira Zimmerman is a 65 y.o. female who presents for a transitional care management visit.    Within 48 business hours after discharge our office contacted her via telephone to coordinate her care and needs.      I reviewed and discussed the details of that call along with the discharge summary, hospital problems, inpatient lab results, inpatient diagnostic studies, and consultation reports with Tahira.     Current outpatient and discharge medications have been reconciled for the patient.  Reviewed by: Noemy Queen MD      Date of TCM Phone Call 2/12/2021   Hospital [facility]   Date of Admission 2/9/2021   Date of Discharge 2/12/2021   Discharge Disposition Home or Self Care     Risk for Readmission (LACE) Score: 13 (2/12/2021  6:00 AM)      Cough  This is a chronic problem. The current episode started more than 1 month ago. The problem has been waxing and waning. The cough is non-productive. Pertinent negatives include no chest pain, chills, ear congestion, fever, headaches, rhinorrhea or shortness of breath. Nothing aggravates the symptoms. She has tried rest, OTC cough suppressant, cool air and body position changes for the symptoms. The treatment provided no relief. Her past medical history is significant for pneumonia. metastatic ovarian cancer to the lung      Course During Hospital Stay:  Bronchoscopy showed metastatic disease to her lung.  She continues to struggle with cough.     The following portions of the patient's history were reviewed and updated as appropriate: allergies, current medications, past family history, past medical history, past social history, past surgical history and problem list.    Review of Systems   Constitutional: Negative for chills and fever.   HENT: Negative for rhinorrhea.    Respiratory: Positive for cough. Negative for shortness of breath.    Cardiovascular: Negative for chest pain.   Neurological: Negative for headaches.        Objective   Physical Exam  Vitals signs reviewed.   Constitutional:       General: She is not in acute distress.     Appearance: She is well-developed.   HENT:      Head: Normocephalic.   Eyes:      General: Lids are normal.      Conjunctiva/sclera: Conjunctivae normal.   Neck:      Musculoskeletal: Normal range of motion.      Thyroid: No thyroid mass or thyromegaly.      Trachea: Trachea normal.   Cardiovascular:      Rate and Rhythm: Normal rate and regular rhythm.      Heart sounds: Normal heart sounds.   Pulmonary:      Effort: Pulmonary effort is normal.      Breath sounds: Normal breath sounds.   Abdominal:      Palpations: Abdomen is soft.   Lymphadenopathy:      Cervical: No cervical adenopathy.   Skin:     General: Skin is warm and dry.   Neurological:      Mental Status: She is alert and oriented to person, place, and time.   Psychiatric:         Attention and Perception: She is attentive.         Mood and Affect: Mood normal.         Speech: Speech normal.         Behavior: Behavior normal.         Assessment/Plan   Diagnoses and all orders for this visit:    1. Pneumonia of right lower lobe due to infectious organism (Primary)  Assessment & Plan:  Follow up with oncologist in 2 days as planned.      2. Cough  -     montelukast (Singulair) 10 MG tablet; Take 1 tablet by mouth Every Night.  Dispense: 30 tablet; Refill: 0    3. Malignant neoplasm of right ovary (CMS/HCC)               Answers for HPI/ROS submitted by the patient on 2/22/2021   What is the primary reason for your visit?: Other  Please describe your symptoms.: Hospital follow up, cancer  Have you had these symptoms before?: Yes  How long have you been having these symptoms?: Greater than 2 weeks  Please describe any probable cause for these symptoms. : Cancer

## 2021-02-25 NOTE — TELEPHONE ENCOUNTER
Called [facility] to see where pt was at with her FFP transfusion. At this time they are getting ready to start the transfusion. I asked that, per Dr. Ball, they call the INR result to Dr. Ball's phone and if the pt's INR is greater than 5 to send the pt to the ER for additional units of FFP. Order entered into Epic.

## 2021-02-25 NOTE — TELEPHONE ENCOUNTER
Called the [facility] to notify them that Dr. Ball has ordered 2 units of FFP due to elevated INR and that the INR needs to be rechecked after the infusion and the results be called. Phone number given.

## 2021-02-25 NOTE — PROGRESS NOTES
Patient here today for INR and infusion and her INR came back greater than 8 so the patient was sent to [facility] for 2 units FFP.    Patient advised to hold warfarin until Monday when seen by Dr Ball,  She VU.   Patients chemo was held for INR as well as patient still has cough from admission in early FEB.  Dr Ball to see patient on Monday.  Chemo appt to be scheduled for one week.     Port accessed for blood draw.  Blood return noted.  10 CC blood wasted prior to specimen collection.  Blood specimen obtained and sent to lab for processing

## 2021-02-26 NOTE — PROGRESS NOTES
Patient here for repeat INR 4.0 today after FFP on 2/25/21 advised to hold until she come in on 3/1/21 because she didn't want to go to the hospital in Sunday to get it checked. Advised patient that if she has any problems she is to go to the ER patient v/u

## 2021-03-01 NOTE — TELEPHONE ENCOUNTER
Received call from [facility], stating CT CAP results are in Epic and wanted to know if patient could be released.  Informed Dr. Ball.  Scans reviewed.  Dr. Ball stated it was ok to release patient and someone would be calling patient with results.  Radha mcpherson/yung.

## 2021-03-01 NOTE — TELEPHONE ENCOUNTER
STAT CT scans ordered on patient today.  Spoke with [facility] Scheduling.  Patient can come over now.  Patient's daughter, Pati, notified and v//u.

## 2021-03-01 NOTE — PROGRESS NOTES
Hematology/Oncology Outpatient Follow Up    PATIENT NAME:Tahira Zimmerman  :1955  MRN: [id number]  PRIMARY CARE PHYSICIAN: Noemy Queen MD  REFERRING PHYSICIAN: Noemy Queen MD    Chief Complaint   Patient presents with   • Appointment     Malignant neoplasm of right ovary (CMS/HCC)   • Follow-up      HISTORY OF PRESENT ILLNESS:     1. Recurrent ovarian cancer diagnosis established in 2013.  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  The patient was treated with surgery and then postoperatively with adjuvant chemotherapy consisting of carboplatin and Taxol.  Six months later, she had recurrence of disease intra-abdominally between the rectum and vagina, which was treated with intraabdominal peritoneal chemotherapy.  She had been free of disease since that time.  She reported feeling a mass in the left side of her abdomen approximately six months ago.  This gradually grew and in early 2013 she had her daughter feel the mass.  The daughter works for Dr. David Rogers and the patient at that time also complained of intermittent rectal bleeding.  Consultation with Dr. David Rogers was obtained.  The patient had a CT scan of the abdomen and pelvis performed on 13 revealing left lower quadrant mass measuring 9.7 x 9.3 x 6 cm demonstrating areas of dense calcification, soft tissue density and cystic density within it.  Stromal tumor is felt to be most likely a possibly fibroma.  It is generated with necrosis and calcification.  Possible palpable mass in the rectus muscle is seen with calcification and deformity of the rectus muscle and just below this a second mass intra-abdominally was seen measuring 2 cm in the greatest diameter suspicious for second intraabdominal tumor.  The mass separate from the largest mass measuring 2.6 cm in the dependent portion of pelvis is seen on the right of  the midline.  No retroperitoneal lymphadenopathy was seen.  No free air, free fluid or other abnormality was present.  The patient was scheduled for an outpatient colonoscopy, but had syncopal episode while sitting in the toilet the night before and was brought to the emergency room early in the morning by her daughter and son-in-law.  The patient had apparently stopped breathing for a few seconds and did not have a pulse, but started breathing before CPR could be initiated.  In the emergency room, the patient had an EKG revealing sinus rhythm nonspecific T-wave flattening; metabolic panel revealed a glucose of 148, creatinine of 1.3, CBC was normal.  She was treated with intravenous fluid.  The patient’s case was then discussed with Dr. Anabell Monique and patient was subsequently admitted to [facility].  The patient underwent a colonoscopy by Dr. David Rogers on 06/27/13 revealing sigmoid diverticulosis and grade II internal and external hemorrhoids, but no mass or colitis was seen.  CT-guided biopsy of the mass was performed on 06/27/13 as well revealing metastatic papillary adenocarcinoma with numerous psammoma bodies.  Pathology comment stated prior records were not available; however, with history of ovarian cancer the current biopsy would be consistent with metastases from that malignancy.  Oncology consult was obtained and I initially saw the patient on 06/27/2013 where the patient had claimed to have little bit of pain in the area of disease.  She reported being frequently constipated, denies any weight loss or fevers.  She did report having some night sweats, but thought that was because of her menopause.  On 06/27/13 metastatic workup including labs and CT scans were initiated.  CT scan of the chest on 06/27/13 revealed no acute disease in the chest.  A 1.4 cm size focal area of decreased density was noted in the lateral aspect of the right lobe of the liver consistent with a benign cyst  or hemangioma.  There was a very small hiatal hernia measuring 2.2 cm in diameter.  A CT scan of the head performed 06/27/13 revealed no acute intracranial abnormality noted.  CA-125 was 40 units/ml (0-35), CA 19-9 was 11.8 units/ml (0-35), CEA level was 0.8 ng/ml (0-3), TSH level was 1.09 IU/ml (0.34-5.6).  The patient was in workup for the syncopal episode, a carotid Doppler was performed on June 28 revealing an essentially normal study showing a reduction in diameter from 0-15% bilaterally.  A Holter monitor was completed on 06/27/13, which was unremarkable except for a few premature atrial contractions.  The patient was felt stable and subsequently was discharged home on 06/28/13.  Post discharge, the patient was seen at [facility] on 07/05/13 by Dr. Kathryn Thrasher who discussed treatment options with the patient including surgery.  The patient is in the office today 07/16/13 in follow up post hospitalization.  She is reporting that surgery is planned for July 30th of this month at .  · 7/30/13 to 8/3/13 - The patient was in [facility].  The patient was admitted for surgery for her recurrent ovarian cancer.  The patient had exploratory laparotomy, omentectomy, bowel resection, liver biopsy and appendectomy.  Pathology revealed of the omentum metastatic serous carcinoma of the transverse colon, segmental resection showed metastatic serous carcinoma involving mesenteric fat.  The liver wedge resection showed fibrosclerotic thickening of the hepatic capsule.  Benign liver parenchyma.  No evidence of metastatic tumor.  Appendix showed fibrous obliteration of the lumen.  Negative for metastatic carcinoma.  Rectum and sigmoid colon segmental resection showed metastatic serous carcinoma invading the colorectal mucosa uninvolved by tumor.  Vaginal mass showed metastatic serous carcinoma.  Margins are positive for tumor.  · 9/24/13 - Chemotherapy orders were written for  patient to start carboplatin 550 mg IV day one and Taxol 330 mg IV day one to be cycled every three weeks.  · 10/10/13 - The patient started cycle #1 of chemotherapy consisting of Carboplatin and Taxol.  · 09/25/13 -  is 10.6 normal.  · 10/01/13 - CT of the chest, abdomen and pelvis revealed new reticular nodular occupancy in the anterior segment of the right upper lobe, could reflect an inflammation or infectious etiology or could reflect unusual persistence of metastatic tumor.  New liver lesions, the smaller one appears to be implant on the surface of the liver, the larger may also represent an implant including the intrahepatic.  Several small mesenteric nodes seen throughout the abdomen and pelvis.  · 10/02/13 - Port placed by Dr. Sen.  · 10/24/13 - Orders written to start Neupogen daily and if more than three Neupogen are needed to give Neulasta after next chemo.  ANC is 0.15.  · 10/31/14 - Patient given cycle 2 of chemotherapy with Taxol and Carboplatin.  · 11/21/13 - The patient started cycle 3 of chemotherapy consisting of Carboplatin and Taxol.  · 11/26/13 - CT scan of chest, abdomen and pelvis revealed no evidence of active disease in the chest.  Reticulonodular opacity has resolved.  Metastatic lesion impressing upon the hepatic dome similar to prior exam.  No evidence of a change.  No evidence of new disease.  Postsurgical changes in the pelvis and throughout the retroperitoneum in the large bowel.  Finding containing anterior abdominal wall hernia containing fat tissue and enhancing vessels, 3 cm in diameter.  · 12/2/13 - Orders written to start Neupogen per protocol as well as Aranesp due to low hemoglobin and ANC.  ANC is 0.14.  Hemoglobin is 9.7.  · 12/11/13 - Orders written to hold chemotherapy until next week and decrease dose by 20% due to low platelet count.  Platelet count is 85,000.  · 12/16/13 - The patient received cycle 4 of Carboplatin and Taxol at a 20% dose reduction so Taxol dose  is 260 mg and Carboplatin is 440 mg.  · 12/16/13 - CA-125 12.6 (N).  · 12/19/13 - PET/CT scan.  Impression:  1. There is no evidence of hypermetabolism in the liver.  Specifically of the dominant lesion in or adjacent to the right hepatic dome that was seen and described on the recent CT study of 11/26/2013.  It is photopenic relative to the surrounding liver.  2.  There is no evidence of hypermetabolic soft issue mass lesion or free fluid in the abdomen or pelvis.  3.  The tonsillar tissues are slightly enlarged and have moderate activity level.  This maybe physiologic.  Correlation with oral cavity, examination is recommended.  4.  Mild amount of activity in the right level II jugular lymph chain without focally enlarged lymph nodes is of questionable significance.  · 1/6/13 - The patient received cycle 5 of chemotherapy with Taxol and Carboplatin.  · 1/27/14 - The patient started on cycle 6 of Taxol and Carboplatin.  · 2/18/14 - CT of the abdomen and pelvis with contrast; stable lesions impressing upon the hepatic dome, unchanged compared to the prior exam.  Multiple anterior abdominal wall hernias re-demonstrated.  Those above the umbilicus contain omental fat.  Below and to the left of the umbilicus as anterior abdominal hernia containing omental fat in nondilated small bowel which is larger than seen previously.  · 2/20/14 - The patient received cycle 7 of chemotherapy with Taxol and Carboplatin.   · 3/11/14 - Order written to discontinue chemotherapy due to patient has completed 7 cycles of chemotherapy and CT scans suggest possible remission.  · 3/11/14 -  11.0 (N).  · 06/05/14 - CT scan of the chest abdomen and pelvis with contrast: There are multiple anterior abdominal wall hernias.  There are 2 larger anterior abdominal wall hernias at the level of the transverse colon, which contain omental fat.  There are at least 2 small anterior abdominal wall hernias that contain omental fat.  There is a  moderate sized anterior abdominal wall hernia at the level of the umbilicus, eccentric to the left, which contain non-dilated bowel.  Interval decrease in the size of two deposit impressing on the liver.  The third tiny deposit has been stable.  · 8/19/14 -  10.4 (N).  · 12/19/14 -   10.0 (N)  · 1/7/15 - CT chest, abdomen and pelvis with stable appearance of the chest with several micronodules. Small hiatal hernia, slightly larger than previous exam, increased from 1.5 to 2.5 cm in diameter. Bochdalek hernia unchanged. Multiple hepatic lesions. The two dominant lesions at the right hepatic dome had decreased in size from previous. The remaining tiny nodules measured 3-5 mm in diameter and were unchanged. There was no evidence of new intra-abdominal or pelvic mass or free fluid. There were multiple anterior abdominal wall hernias which had increased in size.   · 7/27/15 - Comprehensive metabolic panel with no significant abnormalities. CA-125 of 21 (0-35).   · 10/26/15 - WBC 6, hemoglobin 13, platelet count 204,000. Heart rate 47. CA-125 of 20 (0-35).    · 2/18/16 - CT chest with contrast with stable micronodular density seen in the lungs without significant change from prior exam. CT abdomen and pelvis with regression of liver lesions with no new evidence of metastasis. Multiple ventral hernias again noted without significant change from prior exam. Creatinine 0.9 (0.6-1.3).    · 2/25/16 - Patient hospitalized at [facility] between 2/25/16 and 2/27/16 with pyelonephritis secondary to E-coli. She was given two days of IV Rocephin and then placed on oral Levaquin. She was asked to hold her Coumadin, but then had nausea and vomiting because of Levaquin and stopped the Levaquin also. Her CA-125 was 32 (0-35) and CEA 1.3 (0-5). Her urine grew >100,000 E-coli. CT of the abdomen and pelvis was done which revealed 4.1 x 1.8 cm sized mild ovoid area of decreased density in the liver parenchyma along  the anteromedial aspect of the dome of the right lobe of the liver, unchanged significantly since the previous study of 2/18/16. There was suspicion of a very small pericardial effusion. There was suspicion of a small hiatal hernia. There were several hernias in the anterior abdominal wall. A 3.5 x 3.1 cm incised well-circumscribed abnormal density in the left retroperitoneal fatty soft tissue at the level of the left iliac crest was suggestive of tumor recurrence. There was an abnormal density in the left retroperitoneal soft tissues in the left flank that partially surrounded the left kidney and extended downward to the left pelvic area. Diverticulosis of the distal descending colon and sigmoid colon were seen.     · 3/8/16 - Patient prescribed Bactrim DS 1 p.o. b.i.d. for 10 days for pyelonephritis, which was partially treated with Rocephin and Levaquin. Urine with 40,000 E-coli treated with Macrobid for 7 days.  of 20 (0-35).    · 3/31/16 - PET scan with area of low density anteriorly in the right lobe of the liver with no significant radiopharmaceutical uptake to suggest malignancy. Multiple round soft tissue density masses showing increased radiopharmaceutical activity and multiple anterior abdominal wall hernias.   · 5/10/16 - Patient complains of venous enlargement of the left chest wall around the port. Has not been seen by  yet, but has an appointment on 5/23/16. Complained of a cough.   · 5/12/16 - Infusaport contrast study with no evidence of fibrin sheath. Chest x-ray with mild cardiac prominence.   · 5/23/16 - Patient seen in consultation by Sheng Bowman M.D. at [facility] and a chemotherapy trial recommended.   · 6/1/16 -   of 27.1 (0-35).    · 6/14/16 - WBC 5.71, hemoglobin 12.4, platelet count 188,000. Patient is scheduled for surgery at  on 6/16/16 after further discussion with  physicians. Discussed adjuvant chemotherapy.   · 6/16/16 - Excisional biopsy of abdominal  wall mass performed by Sheng Bowman M.D. at [facility] with pathology revealing metastatic high-grade papillary serous carcinoma.   · 7/7/16 - Received a call from the patient that Tramadol was not working and that she was given Norco #24 after her biopsy at  which did help her pain. Patient prescribed Norco 5/325 one p.o. q. 4-6 hours p.r.n. #60 with no refills. Echocardiogram with left ventricular inferior wall hypokinesis with ejection fraction of 50-55%.   · 7/8/16 - Patient seen in consultation by Sheng Bowman M.D. in consultation at  for metastatic high-grade papillary serous carcinoma diagnosed by excisional biopsy on 6/16/16 with carcinomatosis and patient not a surgical resection candidate. Genetic sequencing and susceptibility testing of tumor was ordered. Biopsy was done of anterior abdominal wall.   · 7/7/16 - Echocardiogram with left atrial enlargement. Mild mitral regurgitation. Left ventricular proximal inferior wall hypokinesis with ejection fraction of 50-55%.   · 7/11/16 - While waiting for genomics results, in order not to delay treatment further, order written for Taxotere 60 mg/M2 IV over one hour followed by Carboplatin AUC 5 over one hour, cycles to be repeated every three weeks.  Comprehensive metabolic panel normal.  26 (0-35).    · 725/16 - Pain contract signed for use of Norco.   · 8/5/16 - Patient stated that she experienced a red rash and was itchy post taking Norco. Norco discontinued and Tramadol refilled.   · 8/16/16 - Patient started cycle 1 chemotherapy. WBC 7.1, hemoglobin 11.2, MCV 88.7, platelet count 94,000. Patient complained of nausea for a week post chemo. Already getting Emend, Aloxi and Decadron. Added Omeprazole 40 mg daily orally.   · 8/17/16 - Urine culture with >100,000 E-coli.   · 8/25/16 - Cycle 2 chemotherapy given.   · 9/9/16 - Magnesium 1.3, replaced intravenously. Potassium 3.4 (3.6-5.1), increased oral potassium supplementation.     · 9/16/16 - Cycle 3 chemotherapy given.   · 9/19/16 - Comprehensive metabolic panel with no significant abnormality.   · 9/20/16 - CT abdomen and pelvis with evidence of progressive metastatic disease in the abdomen and pelvis with interval enlargement of several soft tissue attenuations and subcutaneous nodules in the anterior abdominal wall. Interval enlargement of a left pelvic soft tissue attenuation mass. A lentiform area of low attenuation in the dome of the liver stable in size. Multiple anterior abdominal wall hernias containing fat and/or bowel. Marked pelvic floor laxity. CT chest negative for evidence of metastatic disease. Tiny pulmonary nodules in the right lung stable since 2013 consistent with a benign etiology.   · 9/23/16 - Macrobid 100 mg p.o. b.i.d. x7 days prescribed for UTI. Current chemotherapy discontinued after discussion and new chemotherapy orders written. Carboplatin AUC-5 IV day 1 and Doxil (Liposomal Doxorubicin) 30 mg/M2 IV day 1 to cycle q. 21 days.  of 18 (0-35). Magnesium 1.6, replaced intravenously. Comprehensive metabolic panel with potassium 3.4 (3.6-5.1).     · 10/13/16 - Patient started on cycle 1 of Carboplatin and Liposomal Doxorubicin followed by Neulasta.   · 11/3/16 - Cycle 2 Carbo and Doxil given.   · 11/17/16 - Magnesium 1.0, replaced intravenously. Oral potassium supplement increased.   · 11/29/16 - CT chest with small non-calcified right pulmonary nodules unchanged. Benign bone island in the midthoracic vertebral body along with benign bony hemangiomas in the middle thoracic vertebral bodies. CT abdomen and pelvis with mild progressive metastatic disease from CT of September 2016. Anterior abdominal wall mass superiorly mildly increased in size with new area noted as described measuring 3 x 1.7 cm. 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 Stable area of decreased uptake in the dome of the liver not hypermetabolic  on recent PET scan, likely representing cyst or focal fatty infiltration. Stable small hiatal hernia, fat-containing Bochdalek’s hernia and anterior abdominal wall hernias with stable pelvic floor relaxation.    · 12/1/16 - Cycle 3 chemotherapy given.   · 12/8/16 - Current chemotherapy discontinued and patient started on Gemcitabine 1000 mg/M2 IV days 1, 8, 15 q. 28 days.   · 12/22/16 - Patient seen in followup by Juliana Roy M.D. at the pain clinic, treated with Oxycodone and Lyrica. Transaminases normal. Patient started cycle 1 chemotherapy.   · 12/29/16 - Magnesium 1.1 (1.8-2.5), replaced intravenously.   · 1/5/17 - Cycle 1 day 15 chemotherapy held as patient leaving for vacation to [place]. Chemotherapy dose decreased by 20%. Patient complains of diarrhea for which Imodium prescribed.   · 1/11/17 - BRCA-1 and BRCA-2 negative.   · 1/12/17 - Echocardiogram with ejection fraction 60% or greater.   · 1/19/17 - Comprehensive metabolic panel with no significant abnormality. Patient started cycle 2 chemotherapy.   · 2/2/17 - Urine with >100,000 E-coli treated with Cipro 500 mg p.o. b.i.d. x1 week.   · 2/6/17 - Magnesium 1.1 (1.8-2.5), replaced intravenously.    · 2/23/17 - Patient started cycle 3 chemotherapy.   · 2/27/17 - CT chest with interval development of too numerous to count very small pulmonary nodules, ill-defined ground glass opacities concerning for development of pulmonary metastatic disease. Stable left-sided chest port. CT abdomen and pelvis with stable appearance of multiple small soft tissue densities within the abdomen near midline subcutaneous fat of the abdominal wall. Interval decrease in size of largely calcified left pelvic mass and multiple fat in bowel containing small ventral hernias.   · 3/6/17 - Pulmonary consultation obtained for lung nodules. Discussed CT results with patient. Decision made to continue present chemotherapy until further lung evaluation as abdominal disease  better.  of 18 (0-35).    · 3/16/17 - Patient started cycle 4 chemotherapy, but treatment held from day 8 onwards because of hospitalization.   · 3/19/17 - Patient was hospitalized at [facility] between 3/19/17 and 3/25/17 with chest pain. Chest x-ray had revealed increased mild bibasilar airspace disease. Troponin I and EKG’s were negative. INR was elevated. Patient was treated with antibiotics. EGD was performed revealing small hiatal hernia and esophageal dilatation was performed. Bronchoscopy was performed also with no intrabronchial lesions identified. IgA was 51 (), IgG 320 (600-1500) and IgM 19 (). Lexiscan nuclear stress test had no evidence of reversible myocardial ischemia with normal left ventricular ejection fraction of 58%. CT chest had development of patchy bilateral ground glass opacities most pronounced involving the left upper lobe and bilateral lower lobes. Multiple tiny bilateral pulmonary nodules were less conspicuous. The patient underwent a bronchoscopy by Jerica Esparza M.D. with right upper lobe bronchoalveolar lavage revealing benign squamous cells and bronchial cells with pulmonary macrophages present. There was mild acute inflammation. Negative for malignant cells. Prilosec was changed to Famotidine for hypomagnesemia.    · 4/6/17 - Magnesium 1.2 (1.8-2.5). Comprehensive metabolic panel with no significant abnormality. Patient seen in follow-up by Fito Ram M.D. at [facility]. Treated with Lyrica and Oxycodone.    · 5/4/17 - Patient complains of a rash itching on her right upper arm. Eczematous rash noted and patient prescribed Cyclocort 0.1% b.i.d. Magnesium infusions continued with each chemo. Order written to resume chemotherapy.   · 5/16/17 - Cycle 5 chemotherapy started.   · 5/26/17 - Magnesium 1.4 (1.8-2.5), replaced intravenously. Potassium 2.9 (3.6-5.1), KCL increased to 20 mEq three in the morning and three in the evenings.   · 5/30/17 - PET scan with  remaining metabolic activity within the nodular areas. The suggested mass which is partially calcified and necrotic within the left aspect of the pelvis seems slightly larger with increased metabolic activity. There was more calcification in these areas compared to prior study, suggesting inflammation.      · 6/8/17 - Patient complaining of shortness of breath. Does take HCTZ at home. Chest x-ray ordered and chemotherapy continued along with weekly magnesium replacement. Chest x-ray with no acute cardiopulmonary abnormalities.   · 6/13/17 - Patient received cycle 6 of chemotherapy.   · 7/31/17 - WBC 5.0, hemoglobin 11.2, MCV 89.7, platelet count 217,000. Patient is to resume chemotherapy starting with cycle 7 on Wednesday of this week.  of 19 (<35). Potassium 3.3 (3.5-5.3).     · 8/2/17 - Patient began cycle 7 of chemotherapy.   · 8/30/17 - Patient started cycle 8 chemotherapy.   · 9/5/17 - Patient seen in followup at Pain Management by Fito Ram M.D. and continued on treatment with Oxycodone and Lyrica.   · 9/13/17 - Patient was hospitalized at [facility] for a day with dizziness secondary to dehydration, hypokalemia and anemia. She was given 1 unit of packed red blood cells and electrolytes were replaced. Chest x-ray revealed a subtle opacity in the left lateral lung base favored to represent atelectasis. Magnesium 1.3 (1.5-2.5), patient continued on replacement.    · 9/22/17 - Chemotherapy and magnesium replacement continued with decrease in chemotherapy dose by 10% secondary to cytopenias.   · 9/25/17 - Cycle 9 chemotherapy started.   · 10/12/17 - CT of the chest with contrast showed several tiny pulmonary nodules. One within the right lower lobe appears new from prior exams. Two adjacent foci of nodularity within the medial right lower lobe suggestive of minor infectious or inflammatory process. CT of the abdomen and pelvis with contrast which showed soft tissue nodules along the anterior abdominal  and pelvic walls unchanged from previous scan. Also a partially calcified mass within the left pelvis unchanged. No acute process identified within the abdomen or pelvis. Several left paracentral ventral hernias unchanged. No evidence of acute bowel obstruction.   · 10/16/17 - Order written for Levaquin 500 mg p.o. daily as the patient states that she has had a productive cough, fever and chills and with the results of the CT scan showing a possible infectious versus inflammatory process. Order also written to continue chemotherapy and magnesium replacement as previously prescribed.   · 10/23/17 - Cycle 10 chemotherapy started.   · 11/19/17 - Patient went to the Emergency Room at [facility] complaining of right lower extremity redness and fever for a day. Venous Doppler had no evidence of a DVT and patient was discharged home on Clindamycin.   · 11/21/17 -  of 17 (0-35).   · 12/4/17 - Cycle 11 chemotherapy started.   · 12/20/17 - PET scan with multiple hypermetabolic soft tissue nodules abutting the anterior abdominal wall, stable from previous examination. Peripherally calcified left lower quadrant mass near the ascending colon stable in size demonstrating no hypermetabolic uptake. Previously had maximum SUV of 7. Right medial basilar pulmonary nodules resolved.   · 12/22/17 - CT left lower extremity with soft tissue swelling with skin thickening present along the anterior and medial aspect of the leg, slightly more pronounced around the palpable marker seen within the upper medial aspect of the lower extremity.   · 12/26/17 - Elastic stockings prescribed for lower extremity edema.   · 1/8/18 - Patient hospitalized at [facility] between 1/8/18 and 1/11/18 with fever of up to 101 degrees and painful rash on the lower extremities of several weeks’ duration. She was found to be hypokalemic and MRI of the lower extremities revealed thickening and swelling. Chest x-ray had no acute cardiopulmonary abnormality. Skin biopsy was  performed by Surgery revealing stasis dermatitis and no evidence of malignancy. Infectious Disease consultation was obtained and IV antibiotics were stopped. Blood cultures had no growth.   · 1/22/18 - Patient asked to start wearing elastic stockings which she has not started yet. She was given a prescription for Dyazide 1 p.o. daily.   · 2/26/18 - Ordered chemo to resume again. Patient unaware that she was supposed to resume chemo after her last visit in January. Continue magnesium infusions on day 1, 8 and 15. Prescribed Levaquin 500 mg p.o. daily x10 days for productive cough x1 week. History of viral illness consistent with influenza.  of 18 (0-35). Chest x-ray with no acute process.    · 3/5/18 - Cycle 12 chemotherapy started.   · 3/26/18 - Options discussed with patient. Decision made to discontinue IV Gemzar and orders written to start on Niraparib (Zejula) 300 mg p.o. daily as maintenance therapy.   · 4/11/18 - Niraparib discontinued due to insurance denial. Orders written to start Carboplatin-AUC 5 IV day 1 cycling every 21 days. Orders written for Neulasta 6 mg subcutaneous kit day 1 with palliative treatment intent and expected duration of treatment three months.   · 4/12/18 - CT chest, abdomen and pelvis with contrast revealed numerous tiny centrilobular nodules in the lungs favored to reflect infectious or inflammatory process. Nodules ranged 1-3 mm in size and are new from prior studies with metastatic disease not entirely excluded. Stable small hiatal hernia. Interval progression of metastatic disease involving the subcutaneous tissues at the ventral abdominal wall. Multiple soft tissue density nodules previously shown to be hypermetabolic on PET scan. New small crescent of low attenuation material along the periphery of the spleen with similar finding at the dome of the liver. Findings are concerning for peritoneal metastases. Density of the dome of the liver remained unchanged from multiple  prior studies. Stable calcified mass in the left pelvic sidewall. Urinary bladder wall thickening.   · 4/23/18 - Cycle 1 chemotherapy with Carboplatin administered along with Neulasta injection.   · 4/26/18 - Neulasta discontinued due to insurance denial.   · 5/14/18 - Patient received cycle 2 Carboplatin.   · 6/5/18 - Cycle 3 day 1 chemotherapy with Carboplatin administered.   · 6/20/18 - CT chest, abdomen and pelvis at [facility] showed re-demonstration of soft tissue mass in the ventral wall hernia left of the midline as well as the dome of the liver, likely representing metastatic disease similar as compared to the prior study. Additional calcified lesions present in the upper left hemipelvis and along the anterior abdominal wall to the right of the midline also likely representing metastatic disease. No definite new lesions were identified. Moderate to large stool burden likely related to mild constipation was present. No suspicious lung nodules were identified. The previously-described ground glass nodules appeared to have resolved. There was a stable subpleural nodule in the right upper lobe measuring 3 mm present since 2014 without significant changes.   · 6/26/18 - Cycle 4 day 1 Carboplatin administered with addition of Neulasta for febrile neutropenia prophylaxis.   · 6/29/18 - Reviewed CT scans with patient. Orders written to discontinue Carboplatin and Neulasta and plan to start Niraparib 300 mg by mouth daily as maintenance therapy. Prescribed Amlodipine 2.5 mg p.o. daily for chemo-induced hypertension.   · 7/18/18 - Orders written to increase weekly Magnesium Sulfate to 3 g IV weekly due to continued hypomagnesemia.   · 8/1/18 - Patient reports she has not received Niraparib (Zejula) to date.   · 8/30/18 - Patient’s phone was not working so though Niraparib approved, the drug company had not been able to get ahold of her. Patient supplied with drug company number so that she can start taking  the pills.   · 9/6/18 -  of 20 (N).   · 9/18/18 - Urinalysis done for dysuria and back pain. Urine culture grew 20,000 to 50,000 colonies of urogenital ruy. Prescribed Bactrim DS one tablet twice daily for one week.   · September 2018 - Patient initiated on Zejula 300 mg p.o. daily.   · 10/1/18 - WBC 3.5, hemoglobin 12, platelet count 74,000. Niraparib (Zejula) dose held and then resumed when platelets above 100,000 at a dose of 200 mg daily.   · 10/15/18 - Patient received Niraparib (Zejula) at 200 mg p.o. daily with a platelet count of 198,000.   · 11/7/18 - WBC 6, hemoglobin 11.6, MCV 96.2, platelet count 137,000. Patient claims to have stopped taking Niraparib a few days prior because of cough. Asked to resume taking it. Ciprofloxacin 500 mg p.o. twice daily for one week for bronchitis.   · 12/3/18 - Magnesium Sulfate infusions changed to every two weeks, to send mag level before and give 3 grams. DC’d Magnesium Oxide and started Magnesium Plus Protein two pills twice daily.   · 1/4/19 - CT chest, abdomen and pelvis with new patchy nodular areas of airspace consolidation within the bilateral upper lobes, left greater than right, favored to be infectious or inflammatory. Interval enlargement of the peritoneal soft tissue masses within the pelvis compatible with progression of disease. Enlarging ventral hernia now containing multiple loops of small bowel and colon.   · 1/7/19 - Patient has not been coming in for weekly magnesium replacement as nursing has not been able to get ahold of her. Results of CT’s discussed with patient. Decision made to change her to IV chemotherapy with Carboplatin AUC-5 day 1 and pegylated liposomal Doxorubicin (Doxil) 30 mg/M2 IV day 1 to cycle every four weeks. Patient made aware of the limited choices of her treatment available.   · 1/31/19 - Echocardiogram showed LVEF 50-55% and otherwise normal echo Doppler study.   · 2/4/19 - New chemotherapy not initiated to date with  difficulty contacting patient for echocardiogram. Neulasta On-Pro 6 mg ordered with chemotherapy due to extensive prior exposure to chemotherapy, increasing risk of febrile neutropenia, history of neutropenic events on prior chemotherapy regimens.   · 2/15/19 - Patient started cycle 1 chemotherapy with Neulasta support.   · 3/6/19 -  of 28 (0-35).   · 3/15/19 - Cycle 2 Carboplatin with Liposomal Doxorubicin (Doxil) and Neulasta support initiated.     · 4/10/19 - Patient was requested to followup with Dr. Queen regarding hypotension and blood pressure medication dosing. Patient appears to be tolerating treatment well with cytopenias not requiring dose adjustments. No Doxil-related skin or mucus membrane toxicities or other significant toxicities to date.   · 4/12/19 - Cycle 3 chemotherapy given.   · 4/16/19 - CT chest, abdomen and pelvis with no evidence of active metastasis to the chest. Several tree-in-bud nodules within the periphery of the inferior right upper lobe likely infectious or inflammatory. Disease burden within abdomen and pelvis stable to slightly increased from prior CT. Specifically, a soft tissue mass along the right rectus muscle slightly increased in bulk. Multiple ventral hernias, some containing loops of bowel, with no evidence of acute bowel obstruction.   · 5/8/19 - Discussed CT results with patient. Overall stable disease and would like to continue same treatment. Dove soap and Hydrocortisone Cream p.r.n. prescribed for scattered rash on back.   · 5/10/19 - Cycle 4 Carboplatin and Liposomal Doxorubicin initiated.   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 It was ER positive, HER2/zuleima negative, PD-L1 negative. There was TOPO1 positivity and TUBB3 positivity. TMB was low (two mutations per megabyte MUTS/MB) and MSI was stable. There were 13 therapies considered with increased benefit. The 13 therapies with increased  benefit included Fulvestrant, Irinotecan, Letrozole, Topotecan, Anastrazole, Exemestane, Tamoxifen, all of which were referenced to [facility] as well as Abemaciclib, Everolimus, Gefitinib, Palbociclib, Ribociclib and Toremifene.     · 6/5/19 echocardiogram with preserved LV systolic function with EF around 60%.  · 6/7/19 cycle 5 chemotherapy with Neulasta support given.  Patient continued on mag oxide 400 mg p.o. twice daily and weekly IV 3 g mag sulfate infusions for persistent hypomagnesemia.  · 7/5/2019- cycle 6 chemotherapy with carboplatin and doxorubicin with Neulasta port initiated.  Ca125:  21 (0-35).  · 7/23/2019-CT chest abdomen and pelvis compared to CT from 4/16/2019 revealed multiple small pulmonary nodules unchanged from prior examination within the right upper and left lower lobes.  Complex ventral hernia similar to prior exam.  Soft tissue nodule along the right lateral margin of the right of the midline measuring 12 x 10 mm unchanged in size.  Irregular soft tissue nodular thickening along the margin of the most inferior aspect of the complex ventral hernia with thickness measuring up to 13 mm similar to prior examination.  Extensive calcified and soft tissue mass within the left lower quadrant decrease in size now measuring 2.6 x 2.3 cm previously 3.0 x 2.5 cm.  Partially calcified mass involving the right rectus sheath measuring 3.1 x 2.7 cm previously measured 3.3 x 2.9 cm.  Densely calcified mass measuring 2.1 x 1.6 cm unchanged previously measured 2 x 1.7 cm.  Right external iliac chain lymph node measuring 9 mm previously measured 5 mm.  · 8/2/2019 cycle 7 chemotherapy given.  · 8/30/2019-cycle 8 chemotherapy with carboplatin and doxorubicin with Neulasta support given.  · 9/27/2019 cycle 9 chemotherapy given.  · 10/1/19 - Echocardiogram showed Normal LV size and contractility EF of 60-65%. Normal RV size. Borderline left atrial enlargement. Aortic valve, mitral valve, tricuspid valve appears  structurally normal, no significant regurgitation seen.No pericardial effusion seen. Proximal aorta appears normal in size.  · 10/18/19 - Magnesium 1.5 (1.8-2.5).  IV magnesium replacement continued.  · 10/25/2019 cycle 10 chemotherapy given.  · 10/29/2019 patient had CT scan of the chest abdomen and pelvis-there is a new 2 mm nodule in the left lower lobe with a stable 2 mm nodule in the left lower lobe.  Follow-up in 6 months was recommended.  Stable multiple soft tissue density and calcified nodules within the ventral abdominal wall and left retroperitoneal fat consistent with nonviable metastatic disease.  These nodules have either decreased in size or stable.  · 11/22/2019 10 received cycle 11 of chemotherapy with Doxil and carboplatinum with Neulasta  · 12/8/2019 to 12/11/2019 patient was admitted to the hospital for neutropenic fever.  · 12/20/2019 patient received cycle 12 of chemotherapy with Doxil and carboplatinum with Neulasta  · 1/13/20: 2 D echo was normal with EF 56% to 60%  · 1/24/20-Patient received cycle 13 of combination chemotherapy with carboplatinum and Doxil  · 2/3/2020 patient had a  which was 25.4  · 2/21/2020-patient received cycle 14 of chemotherapy with carboplatinum and Doxil  · 3/6/2020 patient had CT scan of the chest, abdomen and pelvis this revealed a 3 mm nodule in the left lower lobe present on prior CT of the abdomen unchanged from July 2019.  Stable 3 mm right upper lobe nodule.  There is a lymph node in the AP window measuring 8 x 9 mm prominent left hilar lymph node measuring 12 mm previously was 7 x 7 mm no significant adenopathy was seen in the abdomen there is an area of irregular soft tissue in the left paramidline ventral hernia which is stable measuring 5.7 cm partially calcified mass in the right rectus measuring 3 x 2.5 cm which is also stable the prominent lymph nodes in the left mid hilum and mediastinum may be reactive or metastatic disease  · 4/17/2020-patient  had cycle 15 of single agent carboplatinum  · 5/26/2020 patient had CT scan of the chest, abdomen and pelvis showed 3 new lung nodules measuring 7 mm in the left upper lobe, 5 mm in the left lower lobe and 4 mm in the right lower lobe.  There were additional small lung nodules which were unchanged.  Mildly prominent mediastinal and bilateral hilar lymph nodes mildly increased in size.  Right hilar node 9 mm previously was 5 mm.  Carinal node 9 mm previously was 6 mm.  The nodule at the right side of the hernia sac has increased to 1.5 cm from 1.2 cm.  Also a nodule in the subcutaneous fat currently measures 2.4 was previously 2 cm.  · 5/15/2020-patient received cycle 16 of carboplatinum  · Since the last visit in the office patient patient developed dizzy spell and fell. For that reason she was taken to the emergency room at [facility].    · 7/3/2020 she had brain MRI which showed an 8 mm focus of abnormality in the left aspect of the posterior fossa corresponding to a dural based enhancing lesion thought to represent a calcified meningioma vessels calcified dural based metastasis.  This lesion has a slight local mass-effect and a small amount of surrounding edema.  There is also a 4 to 5 mm rounded focus of abnormal nodular enhancement along the cortex of the left parietal lobe but no significant mass-effect.  This is nonspecific could represent neoplastic process, infectious/inflammatory process or granulomatous disease.  · Patient was seen by neurosurgery, follow-up brain MRI in 8 weeks has been recommended by Dr. Hartman  · 7/9/2020 patient was initiated on single agent topotecan cycle 1 day 1  · 7/16/2020 she received the 8 topotecan  · 8/6/2020 patient received cycle 1 day 15 of topotecan  · 9/2/2020 patient had brain MRI multiple hospital which showed interval increase in size of the metastasis in the left parietal lobe now measuring 9 x 7 mm.  Previously was 5 mm.  There has been interval increase in size of the  left superior cerebellar metastases now measuring 1.3 cm previously was 8 mm.  There was no new metastatic disease identified.  · 9/11/2020 patient received cycle 2-day 15 of single agent topotecan  · Was admitted to the hospital 10/1/2020 for supratherapeutic INR  · 10/13/2020 patient was seen by Dr. Delong she has started stereotactic brain radiation to be completed on 10/28/2020  · 11/6/2020 patient received cycle 3-day 1 of chemotherapy with topotecan  · 11/13/2020 patient received cycle 3-day 8 of topotecan  · 11/30/2020 patient had CT scan of the chest, abdomen and pelvis this showed new reticular nodular interstitial thickening within the right lung mostly in the right middle lobe and right lower lobe worrisome for lymphangitic spread of cancer.  Evidence of progression of metastatic disease in the chest, abdomen and pelvis the new tiny sclerotic foci within the spine worrisome for osseous metastatic disease.  · 12/17/2020 - Discontinued topotecan due to progressive disease with orders written to begin Alimta 500 mg per metered squared IV q. 21 days  · 12/30/2020 - Started cycle 1 day 1 Alimta   · 1/18/2021 patient had bone scan which showed chest metastatic disease.  Xgeva has been ordered     2. Deep vein thrombosis of left upper extremity diagnosis established in October of 2013.  · 10/16/13 - Venous Doppler of left upper extremity.  Impression:  Deep vein thrombosis involving the subclavian, axillary and brachial veins on the left side, superficial vein thrombosis involving the left basilic vein.  · 10/16/13 - Order written for Lovenox 120 mg subcutaneously daily until INR is in therapeutic range.  Order written for Coumadin 5 mg p.o. daily.  The patient is to follow PT and INR protocol.  · 5/20/16 - Venous Doppler bilateral lower extremities normal.  · 7/30/19 - Patient continued on Coumadin following the INR protocol.      3. Anemia diagnosis established in November 2013 and pernicious anemia diagnosis  established March 2016.   · 11/15/13 - Hemoglobin is 7.8.  · 12/2/13 - Orders written to start Aranesp 200 mg subcutaneous every two weeks due to low hemoglobin secondary to chemo induced anemia.  Hemoglobin is 9.7.  Anemia workup is ordered.  · 12/03/13 - Ferritin 179.8 (N), folic acid 8.3 (N), vitamin B12 314, iron 104 (N), TIBC 298 (N), iron saturation 35 (N), Reticulocyte count 0.88 (N).  EPO level 124 (N).  Haptoglobin 22 (H).  · 10/22/14 - Hemoglobin 12.5, hematocrit 37.3, MCV 91.6.  · 10/23/14 - Anemia resolved.   · 3/8/16 - WBC 5.8, hemoglobin 11.7, MCV 90.3, platelet count 245,000. Vitamin B12 of 189 (180-914), ferritin 61 (), iron 91 (), TIBC 345 (228-428).   · 3/15/16 -  ().        · 3/22/16 - Intrinsic factor antibody positive, antiparietal antibody negative. Vitamin B12 at 1000 mcg IM weekly started, but the patient after receiving first dose on 3/22/16 did not come back for injections until 4/13/16.   · 4/13/16 - WBC 5.1, hemoglobin 12.5, MCV 87.9, platelet count 201,000. Patient given B12 injection and then continued at home.   · 5/10/16 - WBC 5.1, hemoglobin 12.5, platelet count 201,000.   · 6/14/16 - Monthly Vitamin B12 injections continued at home.   · 7/11/16 - WBC 5.48, hemoglobin 12.7, MCV 86.2, platelet count 200,000.   · 8/16/16 - Patient continued on monthly Vitamin B12 injections at home.   · 1/5/17 - WBC 2.6 with 38% neutrophils, 56% lymphocytes, 5% monocytes, hemoglobin 10.5, .3, platelet count 63,000. Patient continued on Vitamin B12 injections 1000 mcg IM monthly at home.   · 3/20/17 - Retic 0.41 (0.5-1.5), creatinine 1.0 (0.4-1.0). Iron 12 (), TIBC 270 (228-428), ferritin 259 (), haptoglobin 340 (), TSH 0.43 (0.34-5.6), folate 6.0 (5.9-24.8). Serum protein electrophoresis revealed decreased gammaglobulins. Serum EDU had no monoclonal gammopathy identified. Stool Hemoccult was negative.   · 6/8/17 - WBC 6.3, hemoglobin 8.3, MCV 92.4,  platelet count 50,000. Procrit 40,000 units subq weekly added for chemotherapy-induced anemia. Patient continued on Vitamin B12 at 1000 mcg IM monthly at home.   · 7/5/17 - Iron 33 (), TIBC 328 (228-428), ferritin 211 (), TSH 2.46 (0.34-5.6).       · 7/31/17 - WBC 5.0, hemoglobin 11.2, MCV 89.7, platelet count 217,000. We will continue with the Procrit 40,000 units subq weekly for chemo-induced anemia when the hemoglobin falls below 10.0 and the patient is also to continue with the Vitamin B12 at 1000 mcg IM monthly at home. Creatinine 1.24 (0.5-0.99).    · 8/28/17 - WBC 9.6, hemoglobin 8.7, MCV 93.7, platelet count 133,000. Patient is to continue with the Procrit 40,000 units subq weekly and will receive that today. She is also to continue with the Vitamin B12 at 1000 mcg IM monthly at home.  · 10/16/17 - WBC 7.5, hemoglobin 9.6, MCV 98.4, platelet count 195,000. Patient is to continue with Procrit 40,000 units subq weekly and will receive Procrit today.   · 1/1/18 - Creatinine 1.3 (0.4-1.0).    · 2/19/18 - Procrit given for hemoglobin 9.9.   · 2/26/18 - Continue Procrit 40,000 units weekly p.r.n. hemoglobin <10. Continue Vitamin B12 at 1000 mcg IM monthly at home.   · 3/26/18 - Hemoglobin 8.3. Procrit dose increased to 60,000 units weekly. Iron 29 (), TIBC 306 (228-428), and iron sat 9% (15-50). Iron 29 (), TIBC 306 (228-428), iron saturation 9% (15-50).   · 3/27/18 - Order written to start Ferrous sulfate 325 mg p.o. b.i.d.    · 4/2/18 - Stool heme negative x3.   · 4/30/18 - Patient had not started Ferrous sulfate 325 mg p.o. b.i.d. and verbalized understanding to do so and to notify the office if side effects are not tolerable.   · 5/24/18 - Patient was hospitalized at [facility] between 5/24/18 and 5/26/18 with dark tarry stool. INR was subtherapeutic. She was given IV Protonix and Protonix 40 mg daily. She underwent an EGD by David Rogers M.D. revealing small hiatal hernia, small  submucosal nodule near the cardia, erosive gastritis. Pathology on gastric antrum and body biopsy revealed iron pill gastritis and no evidence of Helicobacter pylori. She then underwent a colonoscopy revealing diverticulosis, hemorrhoids and surgical changes from previous resection. It was recommended to consider outpatient M2A if hemoglobin continued to drop.   · 6/4/18 - Order written to discontinue iron pills and to use Injectafer 750 mg IV days 1 and 8 for low iron sats. Order written for Procrit 40,000 units subq weekly. Iron 65 (), TIBC 328 (228-428), iron saturation 20% (15-50), ferritin 123 ().   · 6/29/18 - Discussed patient’s intolerance of oral iron due to gastritis. Oral iron discontinued with plans for Injectafer should iron level drop in the future.   · 11/7/18 - Patient claims not to be taking Vitamin B12 injections at home. Asked to resume those.   · 12/3/18 - Patient reports she has not been taking her B12 injections for a couple of months. She needs some syringes and needles for that.   · 2/4/19 - Patient was uncertain if she is currently taking ferrous Sulfate or not. Patient with history of intolerance and will follow hemoglobin.   · 5/8/19 - Ferritin 64 ().  · 8/27/2019- iron 52 (), iron saturation 14% (15-50), TIBC 364 (228-420), and ferritin 74 ().  Injectafer 750 mg IV day 1 and 8 due to oral iron intolerance ordered.  · 8/30/2019- Injectafer 750 mg IV day 1 given.  Hemoglobin 10.8.  At the end of the infusion heart rate was 48 and the patient reported mild dizziness.  Patient was sent to the ED for evaluation with symptoms resolving rapidly.  Chest x-ray and CT head were negative for acute findings.  · 9/6/2019-Injectafer 750 mg IV day 8 given without adverse effects.  Hemoglobin 9.8.   · 9/25/19 - Iron 85 (). Iron saturation 25 (15-50). TIBC 335 (228-428). Ferritin  694 ().  Hemoglobin 10.3.  · 10/25/2019 hemoglobin 9.7.  Patient started on Retacrit  40,000 units subcu weekly.  · 12/17/2020 hemoglobin 11.9, hematocrit 38.3     Past Medical History:   Diagnosis Date   • Anemia 2013   • Cervical disc disorder 2013    Herniated disc, pinched nerve   • Clotting disorder (CMS/HCC) 2013    Low platelets from chemo   • Colon polyp 2013   • Deep vein thrombosis (CMS/HCC) 2013   • Diverticulosis 2013   • GERD (gastroesophageal reflux disease) 2016   • HL (hearing loss) 2016    I need hearing aids   • Hyperlipidemia 2013    Need my cholesterol rechecked   • Hypertension    • Joint pain 2013    Shoulder pain, torn rotator cuff   • Low back pain 2013   • Neuromuscular disorder (CMS/[facility]) 2015    Shingles, pinched nerves in my neck   • Osteopenia 2014   • Osteoporosis    • Ovarian cancer (CMS/[facility])     ovarian   • Ovarian cancer on left (CMS/[facility]) 1995   • Pneumonia 2010    Have had it several times   • Spinal stenosis 2013    Cervical   • Urinary tract infection 2013    Have had several utis       Past Surgical History:   Procedure Laterality Date   • BRONCHOSCOPY N/A 2/10/2021    Procedure: BRONCHOSCOPY with bronchioalveolar lavage;  Surgeon: Jerica Esparza MD;  Location: [facility] ENDOSCOPY;  Service: Pulmonary;  Laterality: N/A;  post op:right lobe pneumonia   • CATARACT EXTRACTION     • COLON SURGERY     • COLONOSCOPY  05/26/2018   • EXPLORATORY LAPAROTOMY     • HERNIA REPAIR     • HYSTERECTOMY     • OOPHORECTOMY           Current Outpatient Medications:   •  acetaminophen (TYLENOL) 500 MG tablet, Take 1,000 mg by mouth Every 6 (Six) Hours As Needed for Mild Pain ., Disp: , Rfl:   •  albuterol sulfate  (90 Base) MCG/ACT inhaler, Inhale 2 puffs Every 4 (Four) Hours As Needed for Wheezing., Disp: 18 g, Rfl: 0  •  atorvastatin (LIPITOR) 20 MG tablet, Take 20 mg by mouth every night at bedtime., Disp: , Rfl: 3  •  azelastine (ASTELIN) 0.1 % nasal spray, 2 sprays into the nostril(s) as directed by provider Daily. Use in each nostril as directed, Disp: 30 mL, Rfl: 0  •   benzonatate (Tessalon Perles) 100 MG capsule, Take 1 capsule by mouth 3 (Three) Times a Day As Needed for Cough., Disp: 30 capsule, Rfl: 0  •  calcium carbonate (Calcium 600) 600 MG tablet, Take 1 tablet by mouth 2 (two) times a day., Disp: 60 tablet, Rfl: 11  •  cetirizine (zyrTEC) 10 MG tablet, Take 1 tablet by mouth Every Night., Disp: 30 tablet, Rfl: 0  •  cyanocobalamin 1000 MCG/ML injection, Inject 1,000 mcg into the appropriate muscle as directed by prescriber Every 28 (Twenty-Eight) Days., Disp: , Rfl:   •  famotidine (PEPCID) 40 MG tablet, TAKE 1 TABLET BY MOUTH EVERY MORNING BEFORE BREAKFAST, Disp: 90 tablet, Rfl: 1  •  folic acid (FOLVITE) 1 MG tablet, Take 1 tablet by mouth Daily. Start at least 7 days prior to chemotherapy until at least 3 weeks after all chemotherapy., Disp: 30 tablet, Rfl: 6  •  magnesium oxide (MAG-OX) 400 MG tablet, TAKE 1 TABLET BY MOUTH TWICE A DAY, Disp: 180 tablet, Rfl: 1  •  montelukast (Singulair) 10 MG tablet, Take 1 tablet by mouth Every Night., Disp: 30 tablet, Rfl: 0  •  oxyCODONE (ROXICODONE) 10 MG tablet, Take 1 tablet by mouth Every 6 (Six) Hours As Needed for Moderate Pain ., Disp: 120 tablet, Rfl: 0  •  potassium chloride (K-DUR,KLOR-CON) 20 MEQ CR tablet, Take 60 mEq by mouth Every Morning., Disp: , Rfl:   •  potassium chloride (K-DUR,KLOR-CON) 20 MEQ CR tablet, Take 40 mEq by mouth Every Night., Disp: , Rfl:   •  pregabalin (LYRICA) 100 MG capsule, Take 1 capsule by mouth 3 (Three) Times a Day., Disp: 90 capsule, Rfl: 2  •  sertraline (ZOLOFT) 50 MG tablet, Take 50 mg by mouth Every Night., Disp: , Rfl:   •  temazepam (RESTORIL) 30 MG capsule, TAKE 1 CAPSULE BY MOUTH AT NIGHT AS NEEDED FOR SLEEP, Disp: 30 capsule, Rfl: 0  •  triamterene-hydrochlorothiazide (DYAZIDE) 37.5-25 MG per capsule, TAKE 1 CAPSULE BY MOUTH EVERY DAY, Disp: 90 capsule, Rfl: 1  •  warfarin (COUMADIN) 5 MG tablet, Take 1 tablet by mouth Daily. At 1700 as directed, Disp: 30 tablet, Rfl:  0    Allergies   Allergen Reactions   • Morphine Nausea Only and Hives   • Penicillin V Potassium Rash   • Sulfa Antibiotics Rash   • Levaquin [Levofloxacin] Nausea Only and Dizziness     When taken with Coumadin   • Amoxicillin Rash   • Norco [Hydrocodone-Acetaminophen] Rash       Family History   Problem Relation Age of Onset   • Hypertension Mother    • Cancer Father    • Hypertension Father    • Hypertension Sister    • Hypertension Brother    • Stroke Brother        Cancer-related family history includes Cancer in her father.    Social History     Tobacco Use   • Smoking status: Never Smoker   • Smokeless tobacco: Never Used   Substance Use Topics   • Alcohol use: No     Frequency: Never     Comment: caffeine 32-64oz qd   • Drug use: No     HPI, ROS and PFSH have been reviewed and confirmed on 3/1/2021.     SUBJECTIVE:    Patient is here today for follow-up.  Patient had supratherapeutic INR last week and received FFP transfusion.  She feels better she still has cough more productive of small clear sputum      REVIEW OF SYSTEMS:    Review of Systems   Constitutional: Negative for chills and fever.   HENT: Negative for ear pain, mouth sores, nosebleeds and sore throat.    Eyes: Negative for photophobia and visual disturbance.   Respiratory: Negative for wheezing and stridor.    Cardiovascular: Negative for chest pain and palpitations.   Gastrointestinal: Negative for abdominal pain, diarrhea, nausea and vomiting.   Endocrine: Negative for cold intolerance and heat intolerance.   Genitourinary: Negative for dysuria and hematuria.   Musculoskeletal: Negative for joint swelling and neck stiffness.   Skin: Negative for color change and rash.   Neurological: Negative for seizures and syncope.   Hematological: Negative for adenopathy.        No obvious bleeding   Psychiatric/Behavioral: Negative for agitation, confusion and hallucinations.     OBJECTIVE:  Vitals:    03/01/21 1009   BP: 117/79   Pulse: 63   Resp: 18  "  Temp: 97.5 °F (36.4 °C)   TempSrc: Temporal   Weight: 80.3 kg (177 lb)   Height: 165.1 cm (65\")   PainSc: 0-No pain     Temp 97.5   Pulse 64  RR 18  /92  Height 165.1 cm   Weight 81.6 kg   BSA 1.89  BMI 30    ECOG  (1) Restricted in physically strenuous activity, ambulatory and able to do work of light nature    Physical Exam   Constitutional: She is oriented to person, place, and time. No distress.   Obese    HENT:   Head: Normocephalic and atraumatic.   Mouth/Throat: Mucous membranes are moist.   Eyes: Conjunctivae are normal. Right eye exhibits no discharge. Left eye exhibits no discharge. No scleral icterus.   Neck: Normal range of motion. Neck supple. No thyromegaly present.   Cardiovascular: Normal rate, regular rhythm and normal heart sounds. Exam reveals no gallop and no friction rub.   Pulmonary/Chest: Effort normal. No stridor. No respiratory distress. She has no wheezes.   Left chest wall port    Abdominal: Soft. Bowel sounds are normal. She exhibits no mass. There is no abdominal tenderness. There is no rebound and no guarding.   Musculoskeletal: Normal range of motion. No tenderness.   Lymphadenopathy:     She has no cervical adenopathy.   Neurological: She is alert and oriented to person, place, and time. She exhibits normal muscle tone.   Skin: Skin is warm and dry. She is not diaphoretic. No erythema.   Psychiatric: Her behavior is normal. Mood normal.   Nursing note and vitals reviewed.    I have reexamined the patient and the results are consistent with the previously documented exam. Cindy Ball MD     RECENT LABS    WBC   Date Value Ref Range Status   03/01/2021 5.97 3.40 - 10.80 10*3/mm3 Final   07/05/2020 4.04 (L) 4.5 - 11.0 10*3/uL Final     RBC   Date Value Ref Range Status   03/01/2021 3.63 (L) 3.77 - 5.28 10*6/mm3 Final   07/05/2020 3.68 (L) 4.0 - 5.2 10*6/uL Final     Hemoglobin   Date Value Ref Range Status   03/01/2021 10.7 (L) 12.0 - 15.9 g/dL Final   07/05/2020 11.5 " (L) 12.0 - 16.0 g/dL Final     Hematocrit   Date Value Ref Range Status   03/01/2021 33.8 (L) 34.0 - 46.6 % Final   07/05/2020 35.3 (L) 36.0 - 46.0 % Final     MCV   Date Value Ref Range Status   03/01/2021 93.1 79.0 - 97.0 fL Final   07/05/2020 95.9 80.0 - 100.0 fL Final     MCH   Date Value Ref Range Status   03/01/2021 29.5 26.6 - 33.0 pg Final   07/05/2020 31.3 26.0 - 34.0 pg Final     MCHC   Date Value Ref Range Status   03/01/2021 31.7 31.5 - 35.7 g/dL Final   07/05/2020 32.6 31.0 - 37.0 g/dL Final     RDW   Date Value Ref Range Status   03/01/2021 19.8 (H) 12.3 - 15.4 % Final   07/05/2020 15.9 12.0 - 16.8 % Final     RDW-SD   Date Value Ref Range Status   03/01/2021 65.0 (H) 37.0 - 54.0 fl Final     MPV   Date Value Ref Range Status   03/01/2021 11.1 6.0 - 12.0 fL Final   07/05/2020 10.2 6.7 - 10.8 fL Final     Platelets   Date Value Ref Range Status   03/01/2021 223 140 - 450 10*3/mm3 Final   07/05/2020 158 140 - 440 10*3/uL Final     Neutrophil Rel %   Date Value Ref Range Status   07/05/2020 54.0 45 - 80 % Final     Neutrophil %   Date Value Ref Range Status   03/01/2021 58.1 42.7 - 76.0 % Final     Lymphocyte Rel %   Date Value Ref Range Status   07/05/2020 30.4 15 - 50 % Final     Lymphocyte %   Date Value Ref Range Status   03/01/2021 19.6 19.6 - 45.3 % Final     Monocyte Rel %   Date Value Ref Range Status   07/05/2020 12.4 0 - 15 % Final     Monocyte %   Date Value Ref Range Status   03/01/2021 19.8 (H) 5.0 - 12.0 % Final     Eosinophil %   Date Value Ref Range Status   03/01/2021 2.0 0.3 - 6.2 % Final   07/05/2020 3.0 0 - 7 % Final     Basophil Rel %   Date Value Ref Range Status   07/05/2020 0.2 0 - 2 % Final     Basophil %   Date Value Ref Range Status   03/01/2021 0.5 0.0 - 1.5 % Final     Immature Grans %   Date Value Ref Range Status   07/05/2020 0.0 0 % Final     Neutrophils Absolute   Date Value Ref Range Status   07/05/2020 2.18 2.0 - 8.8 10*3/uL Final     Neutrophils, Absolute   Date Value  Ref Range Status   03/01/2021 3.47 1.70 - 7.00 10*3/mm3 Final     Lymphocytes Absolute   Date Value Ref Range Status   07/05/2020 1.23 0.7 - 5.5 10*3/uL Final     Lymphocytes, Absolute   Date Value Ref Range Status   03/01/2021 1.17 0.70 - 3.10 10*3/mm3 Final     Monocytes Absolute   Date Value Ref Range Status   07/05/2020 0.50 0.0 - 1.7 10*3/uL Final     Monocytes, Absolute   Date Value Ref Range Status   03/01/2021 1.18 (H) 0.10 - 0.90 10*3/mm3 Final     Eosinophils Absolute   Date Value Ref Range Status   07/05/2020 0.12 0.0 - 0.8 10*3/uL Final     Eosinophils, Absolute   Date Value Ref Range Status   03/01/2021 0.12 0.00 - 0.40 10*3/mm3 Final     Basophils Absolute   Date Value Ref Range Status   07/05/2020 0.01 0.0 - 0.2 10*3/uL Final     Basophils, Absolute   Date Value Ref Range Status   03/01/2021 0.03 0.00 - 0.20 10*3/mm3 Final     Immature Grans, Absolute   Date Value Ref Range Status   07/05/2020 0.00 <1 10*3/uL Final     nRBC   Date Value Ref Range Status   02/12/2021 0.1 0.0 - 0.2 /100 WBC Final       Lab Results   Component Value Date    GLUCOSE 107 (H) 02/25/2021    BUN 14 02/25/2021    CREATININE 0.91 02/25/2021    EGFRIFNONA 62 02/25/2021    EGFRIFAFRI >60 06/07/2019    BCR 15.4 02/25/2021    K 4.3 02/25/2021    CO2 24.0 02/25/2021    CALCIUM 9.1 02/25/2021    ALBUMIN 3.50 02/25/2021    LABIL2 1.3 07/03/2020    AST 25 02/25/2021    ALT 9 02/25/2021     ASSESSMENT:    1. Recurrent ovarian cancer metastases to the brain was on carboplatinum, Doxil.  Patient had had carboplatinum Taxol, carbo Taxotere, Gemzar single agent, niraparib and was on Doxil and carboplatin.  Doxil was discontinued due to approaching of lifetime dosing.  Progressed on single agent topotecan.  Refer to paradigm testing for future options at time of progression.  Patient has had progression of disease and therefore platinum was discontinued.  She is now on Alimta single agent.  On exam today I felt a right lower abdominal mass  which is new.  For that I will go ahead and proceed with CT scan of the chest abdomen and pelvis and reevaluate.  For now we will continue to hold Alimta until her CT scans are completed.  She has a subcutaneous nodule on the anterior abdominal wall that measures approximately 3.5 cm as well.  2. Patient has a subcutaneous nodule noted on the anterior abdominal wall consistent with metastatic disease currently measures 3.5 cm.  We will continue to follow-up on this.  3. Patient has bone metastases therefore I recommended Xgeva 120 mg subcu monthly.  Reviewed the side effects, benefits in detail with patient.  She would also be initiated on Os-Johnny D 600 mg twice a day.  She will schedule with her dentist prior to initiating Xgeva.  4. Patient has evidence of disease progression in the chest, abdomen and pelvis now with bone involvement as well. Started Alimta on 12/30/2020.  Continue the same  5. Progressive brain metastasis: Status post stereotactic brain radiation under the care of Dr. Delong and recent MRI of the brain on 12/15/2020 shows probable progression.  Recent brain MRI did not show any obvious signs of progression.  6. Iron deficiency anemia: Intermittently on  Iron  7. B12 deficiency on parenteral B12 injections  8. History of DVT on anticoagulation therapy with warfarin: Warfarin has been held today due to supratherapeutic INR  9. Pancytopenia: Due to chemotherapy: On Procrit  10. Probable bone metastasis: As discussed above.  We will begin Xgeva  11. Hypomagnesemia: Continue to monitor levels and infuse as needed  12. Monitoring for chemotherapy toxicities on Alimta  13. Iron deficiency anemia   14. Recent supratherapeutic INR: Status post FFP transfusion.  INR is now in the therapeutic range.  Restart warfarin at 5 mg daily and repeat INR on 3/4/2021  15. Assessment has been reviewed and updated as documented above    PLAN:    1. Hold Alimta   2. We will repeat CT scan of the chest, abdomen and  pelvis  3. INR on 3/4/2021 on Coumadin 5 mg daily  4. Reviewed bone scan results  5. Continue Xgeva 120 mg subcu monthly  6. Calcium carbonate with Vitamin D 600 mg twice a day  7. Continue folic acid 1 mg p.o. daily -reviewed needs to stay on drug to prevent side effects from Alimta   8. Continue B12 injections 1000 mcg IM monthly, next due 1/14/2021  9. Status post IV Injectafer  10. Continue magnesium oxide 400 mg three times a day and weekly IV replacement as needed.   11. Continue Retacrit 40,000 units subcu weekly for hemoglobin less than 10, for chemotherapy-induced anemia  12. Continue supportive medications  13. MRI brain reviewed  14. Reviewed fever precautions   15. RTC in 1 week  16. All questions answered     I have reviewed labs results, imaging, vitals, and medications with the patient today.   Lab Results   Component Value Date    WBC 5.97 03/01/2021    HGB 10.7 (L) 03/01/2021    HCT 33.8 (L) 03/01/2021    MCV 93.1 03/01/2021     03/01/2021     Patient verbalized understanding and is in agreement of the above plans.        I spent 40 total minutes, face-to-face, caring for Tahira today.  90% of this time involved counseling and/or coordination of care as documented within this note.

## 2021-03-02 NOTE — TELEPHONE ENCOUNTER
Attempted to call pt to let her know that her chest CT showed a pericardial effusion and that Dr. Ball has ordered a 2D ECHO. No answer or identifying VM. Callback number left.

## 2021-03-02 NOTE — OUTREACH NOTE
Medical Week 3 Survey      Responses   [facility] patient discharged from?  [facility]   Does the patient have one of the following disease processes/diagnoses(primary or secondary)?  Other   Week 3 attempt successful?  No   Unsuccessful attempts  Attempt 1          Jyoti Silva RN

## 2021-03-03 NOTE — OUTREACH NOTE
Medical Week 3 Survey      Responses   [facility] patient discharged from?  [facility]   Does the patient have one of the following disease processes/diagnoses(primary or secondary)?  Other   Week 3 attempt successful?  No   Unsuccessful attempts  Attempt 2          Carlee Yee LPN

## 2021-03-04 NOTE — TELEPHONE ENCOUNTER
Attempted to call pt to let her know that her potassium and phosphorus are low and a prescription has been sent to her pharmacy. No answer or identifying VM. Callback number left.

## 2021-03-04 NOTE — TELEPHONE ENCOUNTER
Per Anna Marie Muniz RN, Dr. Ball is requesting ECHO be performed STAT. Patient complaining of shortness of breath.  Called and spoke with [facility].  Patient can come over now.  Patient notified and v/u.

## 2021-03-04 NOTE — PROGRESS NOTES
Port accessed and flushed with good blood return noted. 10cc of blood waster prior to specimen collection. Blood specimen obtained and sent to lab for processing per protocol.  Port flushed with saline and heparin prior to needle removal.    Xgeva given in left upper arm. Pt tolerated treatment well.     Pt states that she is taking 5mg of coumadin daily instead of the 5.5mg that she was told to take on Monday. She states that she does have 1mg tablets at home and will take the 5.5mg daily.

## 2021-03-09 NOTE — PROGRESS NOTES
Hematology/Oncology Outpatient Follow Up    PATIENT NAME:Tahira Zimmerman  :1955  MRN: [id number]  PRIMARY CARE PHYSICIAN: Noemy Queen MD  REFERRING PHYSICIAN: Noemy Queen MD    Chief Complaint   Patient presents with   • Appointment     Malignant neoplasm of right ovary (CMS/HCC)   • Follow-up      HISTORY OF PRESENT ILLNESS:     1. Recurrent ovarian cancer diagnosis established in 2013.  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  The patient was treated with surgery and then postoperatively with adjuvant chemotherapy consisting of carboplatin and Taxol.  Six months later, she had recurrence of disease intra-abdominally between the rectum and vagina, which was treated with intraabdominal peritoneal chemotherapy.  She had been free of disease since that time.  She reported feeling a mass in the left side of her abdomen approximately six months ago.  This gradually grew and in early 2013 she had her daughter feel the mass.  The daughter works for Dr. David Rogers and the patient at that time also complained of intermittent rectal bleeding.  Consultation with Dr. David Rogers was obtained.  The patient had a CT scan of the abdomen and pelvis performed on 13 revealing left lower quadrant mass measuring 9.7 x 9.3 x 6 cm demonstrating areas of dense calcification, soft tissue density and cystic density within it.  Stromal tumor is felt to be most likely a possibly fibroma.  It is generated with necrosis and calcification.  Possible palpable mass in the rectus muscle is seen with calcification and deformity of the rectus muscle and just below this a second mass intra-abdominally was seen measuring 2 cm in the greatest diameter suspicious for second intraabdominal tumor.  The mass separate from the largest mass measuring 2.6 cm in the dependent portion of pelvis is seen on the right of  the midline.  No retroperitoneal lymphadenopathy was seen.  No free air, free fluid or other abnormality was present.  The patient was scheduled for an outpatient colonoscopy, but had syncopal episode while sitting in the toilet the night before and was brought to the emergency room early in the morning by her daughter and son-in-law.  The patient had apparently stopped breathing for a few seconds and did not have a pulse, but started breathing before CPR could be initiated.  In the emergency room, the patient had an EKG revealing sinus rhythm nonspecific T-wave flattening; metabolic panel revealed a glucose of 148, creatinine of 1.3, CBC was normal.  She was treated with intravenous fluid.  The patient’s case was then discussed with Dr. Anabell Monique and patient was subsequently admitted to [facility].  The patient underwent a colonoscopy by Dr. David Rogers on 06/27/13 revealing sigmoid diverticulosis and grade II internal and external hemorrhoids, but no mass or colitis was seen.  CT-guided biopsy of the mass was performed on 06/27/13 as well revealing metastatic papillary adenocarcinoma with numerous psammoma bodies.  Pathology comment stated prior records were not available; however, with history of ovarian cancer the current biopsy would be consistent with metastases from that malignancy.  Oncology consult was obtained and I initially saw the patient on 06/27/2013 where the patient had claimed to have little bit of pain in the area of disease.  She reported being frequently constipated, denies any weight loss or fevers.  She did report having some night sweats, but thought that was because of her menopause.  On 06/27/13 metastatic workup including labs and CT scans were initiated.  CT scan of the chest on 06/27/13 revealed no acute disease in the chest.  A 1.4 cm size focal area of decreased density was noted in the lateral aspect of the right lobe of the liver consistent with a benign cyst  or hemangioma.  There was a very small hiatal hernia measuring 2.2 cm in diameter.  A CT scan of the head performed 06/27/13 revealed no acute intracranial abnormality noted.  CA-125 was 40 units/ml (0-35), CA 19-9 was 11.8 units/ml (0-35), CEA level was 0.8 ng/ml (0-3), TSH level was 1.09 IU/ml (0.34-5.6).  The patient was in workup for the syncopal episode, a carotid Doppler was performed on June 28 revealing an essentially normal study showing a reduction in diameter from 0-15% bilaterally.  A Holter monitor was completed on 06/27/13, which was unremarkable except for a few premature atrial contractions.  The patient was felt stable and subsequently was discharged home on 06/28/13.  Post discharge, the patient was seen at [facility] on 07/05/13 by Dr. Kathryn Thrasher who discussed treatment options with the patient including surgery.  The patient is in the office today 07/16/13 in follow up post hospitalization.  She is reporting that surgery is planned for July 30th of this month at .  · 7/30/13 to 8/3/13 - The patient was in [facility].  The patient was admitted for surgery for her recurrent ovarian cancer.  The patient had exploratory laparotomy, omentectomy, bowel resection, liver biopsy and appendectomy.  Pathology revealed of the omentum metastatic serous carcinoma of the transverse colon, segmental resection showed metastatic serous carcinoma involving mesenteric fat.  The liver wedge resection showed fibrosclerotic thickening of the hepatic capsule.  Benign liver parenchyma.  No evidence of metastatic tumor.  Appendix showed fibrous obliteration of the lumen.  Negative for metastatic carcinoma.  Rectum and sigmoid colon segmental resection showed metastatic serous carcinoma invading the colorectal mucosa uninvolved by tumor.  Vaginal mass showed metastatic serous carcinoma.  Margins are positive for tumor.  · 9/24/13 - Chemotherapy orders were written for  patient to start carboplatin 550 mg IV day one and Taxol 330 mg IV day one to be cycled every three weeks.  · 10/10/13 - The patient started cycle #1 of chemotherapy consisting of Carboplatin and Taxol.  · 09/25/13 -  is 10.6 normal.  · 10/01/13 - CT of the chest, abdomen and pelvis revealed new reticular nodular occupancy in the anterior segment of the right upper lobe, could reflect an inflammation or infectious etiology or could reflect unusual persistence of metastatic tumor.  New liver lesions, the smaller one appears to be implant on the surface of the liver, the larger may also represent an implant including the intrahepatic.  Several small mesenteric nodes seen throughout the abdomen and pelvis.  · 10/02/13 - Port placed by Dr. Sen.  · 10/24/13 - Orders written to start Neupogen daily and if more than three Neupogen are needed to give Neulasta after next chemo.  ANC is 0.15.  · 10/31/14 - Patient given cycle 2 of chemotherapy with Taxol and Carboplatin.  · 11/21/13 - The patient started cycle 3 of chemotherapy consisting of Carboplatin and Taxol.  · 11/26/13 - CT scan of chest, abdomen and pelvis revealed no evidence of active disease in the chest.  Reticulonodular opacity has resolved.  Metastatic lesion impressing upon the hepatic dome similar to prior exam.  No evidence of a change.  No evidence of new disease.  Postsurgical changes in the pelvis and throughout the retroperitoneum in the large bowel.  Finding containing anterior abdominal wall hernia containing fat tissue and enhancing vessels, 3 cm in diameter.  · 12/2/13 - Orders written to start Neupogen per protocol as well as Aranesp due to low hemoglobin and ANC.  ANC is 0.14.  Hemoglobin is 9.7.  · 12/11/13 - Orders written to hold chemotherapy until next week and decrease dose by 20% due to low platelet count.  Platelet count is 85,000.  · 12/16/13 - The patient received cycle 4 of Carboplatin and Taxol at a 20% dose reduction so Taxol dose  is 260 mg and Carboplatin is 440 mg.  · 12/16/13 - CA-125 12.6 (N).  · 12/19/13 - PET/CT scan.  Impression:  1. There is no evidence of hypermetabolism in the liver.  Specifically of the dominant lesion in or adjacent to the right hepatic dome that was seen and described on the recent CT study of 11/26/2013.  It is photopenic relative to the surrounding liver.  2.  There is no evidence of hypermetabolic soft issue mass lesion or free fluid in the abdomen or pelvis.  3.  The tonsillar tissues are slightly enlarged and have moderate activity level.  This maybe physiologic.  Correlation with oral cavity, examination is recommended.  4.  Mild amount of activity in the right level II jugular lymph chain without focally enlarged lymph nodes is of questionable significance.  · 1/6/13 - The patient received cycle 5 of chemotherapy with Taxol and Carboplatin.  · 1/27/14 - The patient started on cycle 6 of Taxol and Carboplatin.  · 2/18/14 - CT of the abdomen and pelvis with contrast; stable lesions impressing upon the hepatic dome, unchanged compared to the prior exam.  Multiple anterior abdominal wall hernias re-demonstrated.  Those above the umbilicus contain omental fat.  Below and to the left of the umbilicus as anterior abdominal hernia containing omental fat in nondilated small bowel which is larger than seen previously.  · 2/20/14 - The patient received cycle 7 of chemotherapy with Taxol and Carboplatin.   · 3/11/14 - Order written to discontinue chemotherapy due to patient has completed 7 cycles of chemotherapy and CT scans suggest possible remission.  · 3/11/14 -  11.0 (N).  · 06/05/14 - CT scan of the chest abdomen and pelvis with contrast: There are multiple anterior abdominal wall hernias.  There are 2 larger anterior abdominal wall hernias at the level of the transverse colon, which contain omental fat.  There are at least 2 small anterior abdominal wall hernias that contain omental fat.  There is a  moderate sized anterior abdominal wall hernia at the level of the umbilicus, eccentric to the left, which contain non-dilated bowel.  Interval decrease in the size of two deposit impressing on the liver.  The third tiny deposit has been stable.  · 8/19/14 -  10.4 (N).  · 12/19/14 -   10.0 (N)  · 1/7/15 - CT chest, abdomen and pelvis with stable appearance of the chest with several micronodules. Small hiatal hernia, slightly larger than previous exam, increased from 1.5 to 2.5 cm in diameter. Bochdalek hernia unchanged. Multiple hepatic lesions. The two dominant lesions at the right hepatic dome had decreased in size from previous. The remaining tiny nodules measured 3-5 mm in diameter and were unchanged. There was no evidence of new intra-abdominal or pelvic mass or free fluid. There were multiple anterior abdominal wall hernias which had increased in size.   · 7/27/15 - Comprehensive metabolic panel with no significant abnormalities. CA-125 of 21 (0-35).   · 10/26/15 - WBC 6, hemoglobin 13, platelet count 204,000. Heart rate 47. CA-125 of 20 (0-35).    · 2/18/16 - CT chest with contrast with stable micronodular density seen in the lungs without significant change from prior exam. CT abdomen and pelvis with regression of liver lesions with no new evidence of metastasis. Multiple ventral hernias again noted without significant change from prior exam. Creatinine 0.9 (0.6-1.3).    · 2/25/16 - Patient hospitalized at [facility] between 2/25/16 and 2/27/16 with pyelonephritis secondary to E-coli. She was given two days of IV Rocephin and then placed on oral Levaquin. She was asked to hold her Coumadin, but then had nausea and vomiting because of Levaquin and stopped the Levaquin also. Her CA-125 was 32 (0-35) and CEA 1.3 (0-5). Her urine grew >100,000 E-coli. CT of the abdomen and pelvis was done which revealed 4.1 x 1.8 cm sized mild ovoid area of decreased density in the liver parenchyma along  the anteromedial aspect of the dome of the right lobe of the liver, unchanged significantly since the previous study of 2/18/16. There was suspicion of a very small pericardial effusion. There was suspicion of a small hiatal hernia. There were several hernias in the anterior abdominal wall. A 3.5 x 3.1 cm incised well-circumscribed abnormal density in the left retroperitoneal fatty soft tissue at the level of the left iliac crest was suggestive of tumor recurrence. There was an abnormal density in the left retroperitoneal soft tissues in the left flank that partially surrounded the left kidney and extended downward to the left pelvic area. Diverticulosis of the distal descending colon and sigmoid colon were seen.     · 3/8/16 - Patient prescribed Bactrim DS 1 p.o. b.i.d. for 10 days for pyelonephritis, which was partially treated with Rocephin and Levaquin. Urine with 40,000 E-coli treated with Macrobid for 7 days.  of 20 (0-35).    · 3/31/16 - PET scan with area of low density anteriorly in the right lobe of the liver with no significant radiopharmaceutical uptake to suggest malignancy. Multiple round soft tissue density masses showing increased radiopharmaceutical activity and multiple anterior abdominal wall hernias.   · 5/10/16 - Patient complains of venous enlargement of the left chest wall around the port. Has not been seen by  yet, but has an appointment on 5/23/16. Complained of a cough.   · 5/12/16 - Infusaport contrast study with no evidence of fibrin sheath. Chest x-ray with mild cardiac prominence.   · 5/23/16 - Patient seen in consultation by Sheng Bowman M.D. at [facility] and a chemotherapy trial recommended.   · 6/1/16 -   of 27.1 (0-35).    · 6/14/16 - WBC 5.71, hemoglobin 12.4, platelet count 188,000. Patient is scheduled for surgery at  on 6/16/16 after further discussion with  physicians. Discussed adjuvant chemotherapy.   · 6/16/16 - Excisional biopsy of abdominal  wall mass performed by Sheng Bowman M.D. at [facility] with pathology revealing metastatic high-grade papillary serous carcinoma.   · 7/7/16 - Received a call from the patient that Tramadol was not working and that she was given Norco #24 after her biopsy at  which did help her pain. Patient prescribed Norco 5/325 one p.o. q. 4-6 hours p.r.n. #60 with no refills. Echocardiogram with left ventricular inferior wall hypokinesis with ejection fraction of 50-55%.   · 7/8/16 - Patient seen in consultation by Sheng Bowman M.D. in consultation at  for metastatic high-grade papillary serous carcinoma diagnosed by excisional biopsy on 6/16/16 with carcinomatosis and patient not a surgical resection candidate. Genetic sequencing and susceptibility testing of tumor was ordered. Biopsy was done of anterior abdominal wall.   · 7/7/16 - Echocardiogram with left atrial enlargement. Mild mitral regurgitation. Left ventricular proximal inferior wall hypokinesis with ejection fraction of 50-55%.   · 7/11/16 - While waiting for genomics results, in order not to delay treatment further, order written for Taxotere 60 mg/M2 IV over one hour followed by Carboplatin AUC 5 over one hour, cycles to be repeated every three weeks.  Comprehensive metabolic panel normal.  26 (0-35).    · 725/16 - Pain contract signed for use of Norco.   · 8/5/16 - Patient stated that she experienced a red rash and was itchy post taking Norco. Norco discontinued and Tramadol refilled.   · 8/16/16 - Patient started cycle 1 chemotherapy. WBC 7.1, hemoglobin 11.2, MCV 88.7, platelet count 94,000. Patient complained of nausea for a week post chemo. Already getting Emend, Aloxi and Decadron. Added Omeprazole 40 mg daily orally.   · 8/17/16 - Urine culture with >100,000 E-coli.   · 8/25/16 - Cycle 2 chemotherapy given.   · 9/9/16 - Magnesium 1.3, replaced intravenously. Potassium 3.4 (3.6-5.1), increased oral potassium supplementation.     · 9/16/16 - Cycle 3 chemotherapy given.   · 9/19/16 - Comprehensive metabolic panel with no significant abnormality.   · 9/20/16 - CT abdomen and pelvis with evidence of progressive metastatic disease in the abdomen and pelvis with interval enlargement of several soft tissue attenuations and subcutaneous nodules in the anterior abdominal wall. Interval enlargement of a left pelvic soft tissue attenuation mass. A lentiform area of low attenuation in the dome of the liver stable in size. Multiple anterior abdominal wall hernias containing fat and/or bowel. Marked pelvic floor laxity. CT chest negative for evidence of metastatic disease. Tiny pulmonary nodules in the right lung stable since 2013 consistent with a benign etiology.   · 9/23/16 - Macrobid 100 mg p.o. b.i.d. x7 days prescribed for UTI. Current chemotherapy discontinued after discussion and new chemotherapy orders written. Carboplatin AUC-5 IV day 1 and Doxil (Liposomal Doxorubicin) 30 mg/M2 IV day 1 to cycle q. 21 days.  of 18 (0-35). Magnesium 1.6, replaced intravenously. Comprehensive metabolic panel with potassium 3.4 (3.6-5.1).     · 10/13/16 - Patient started on cycle 1 of Carboplatin and Liposomal Doxorubicin followed by Neulasta.   · 11/3/16 - Cycle 2 Carbo and Doxil given.   · 11/17/16 - Magnesium 1.0, replaced intravenously. Oral potassium supplement increased.   · 11/29/16 - CT chest with small non-calcified right pulmonary nodules unchanged. Benign bone island in the midthoracic vertebral body along with benign bony hemangiomas in the middle thoracic vertebral bodies. CT abdomen and pelvis with mild progressive metastatic disease from CT of September 2016. Anterior abdominal wall mass superiorly mildly increased in size with new area noted as described measuring 3 x 1.7 cm. 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 Stable area of decreased uptake in the dome of the liver not hypermetabolic  on recent PET scan, likely representing cyst or focal fatty infiltration. Stable small hiatal hernia, fat-containing Bochdalek’s hernia and anterior abdominal wall hernias with stable pelvic floor relaxation.    · 12/1/16 - Cycle 3 chemotherapy given.   · 12/8/16 - Current chemotherapy discontinued and patient started on Gemcitabine 1000 mg/M2 IV days 1, 8, 15 q. 28 days.   · 12/22/16 - Patient seen in followup by Juliana Roy M.D. at the pain clinic, treated with Oxycodone and Lyrica. Transaminases normal. Patient started cycle 1 chemotherapy.   · 12/29/16 - Magnesium 1.1 (1.8-2.5), replaced intravenously.   · 1/5/17 - Cycle 1 day 15 chemotherapy held as patient leaving for vacation to [place]. Chemotherapy dose decreased by 20%. Patient complains of diarrhea for which Imodium prescribed.   · 1/11/17 - BRCA-1 and BRCA-2 negative.   · 1/12/17 - Echocardiogram with ejection fraction 60% or greater.   · 1/19/17 - Comprehensive metabolic panel with no significant abnormality. Patient started cycle 2 chemotherapy.   · 2/2/17 - Urine with >100,000 E-coli treated with Cipro 500 mg p.o. b.i.d. x1 week.   · 2/6/17 - Magnesium 1.1 (1.8-2.5), replaced intravenously.    · 2/23/17 - Patient started cycle 3 chemotherapy.   · 2/27/17 - CT chest with interval development of too numerous to count very small pulmonary nodules, ill-defined ground glass opacities concerning for development of pulmonary metastatic disease. Stable left-sided chest port. CT abdomen and pelvis with stable appearance of multiple small soft tissue densities within the abdomen near midline subcutaneous fat of the abdominal wall. Interval decrease in size of largely calcified left pelvic mass and multiple fat in bowel containing small ventral hernias.   · 3/6/17 - Pulmonary consultation obtained for lung nodules. Discussed CT results with patient. Decision made to continue present chemotherapy until further lung evaluation as abdominal disease  better.  of 18 (0-35).    · 3/16/17 - Patient started cycle 4 chemotherapy, but treatment held from day 8 onwards because of hospitalization.   · 3/19/17 - Patient was hospitalized at [facility] between 3/19/17 and 3/25/17 with chest pain. Chest x-ray had revealed increased mild bibasilar airspace disease. Troponin I and EKG’s were negative. INR was elevated. Patient was treated with antibiotics. EGD was performed revealing small hiatal hernia and esophageal dilatation was performed. Bronchoscopy was performed also with no intrabronchial lesions identified. IgA was 51 (), IgG 320 (600-1500) and IgM 19 (). Lexiscan nuclear stress test had no evidence of reversible myocardial ischemia with normal left ventricular ejection fraction of 58%. CT chest had development of patchy bilateral ground glass opacities most pronounced involving the left upper lobe and bilateral lower lobes. Multiple tiny bilateral pulmonary nodules were less conspicuous. The patient underwent a bronchoscopy by Jerica Esparza M.D. with right upper lobe bronchoalveolar lavage revealing benign squamous cells and bronchial cells with pulmonary macrophages present. There was mild acute inflammation. Negative for malignant cells. Prilosec was changed to Famotidine for hypomagnesemia.    · 4/6/17 - Magnesium 1.2 (1.8-2.5). Comprehensive metabolic panel with no significant abnormality. Patient seen in follow-up by Fito Ram M.D. at [facility]. Treated with Lyrica and Oxycodone.    · 5/4/17 - Patient complains of a rash itching on her right upper arm. Eczematous rash noted and patient prescribed Cyclocort 0.1% b.i.d. Magnesium infusions continued with each chemo. Order written to resume chemotherapy.   · 5/16/17 - Cycle 5 chemotherapy started.   · 5/26/17 - Magnesium 1.4 (1.8-2.5), replaced intravenously. Potassium 2.9 (3.6-5.1), KCL increased to 20 mEq three in the morning and three in the evenings.   · 5/30/17 - PET scan with  remaining metabolic activity within the nodular areas. The suggested mass which is partially calcified and necrotic within the left aspect of the pelvis seems slightly larger with increased metabolic activity. There was more calcification in these areas compared to prior study, suggesting inflammation.      · 6/8/17 - Patient complaining of shortness of breath. Does take HCTZ at home. Chest x-ray ordered and chemotherapy continued along with weekly magnesium replacement. Chest x-ray with no acute cardiopulmonary abnormalities.   · 6/13/17 - Patient received cycle 6 of chemotherapy.   · 7/31/17 - WBC 5.0, hemoglobin 11.2, MCV 89.7, platelet count 217,000. Patient is to resume chemotherapy starting with cycle 7 on Wednesday of this week.  of 19 (<35). Potassium 3.3 (3.5-5.3).     · 8/2/17 - Patient began cycle 7 of chemotherapy.   · 8/30/17 - Patient started cycle 8 chemotherapy.   · 9/5/17 - Patient seen in followup at Pain Management by Fito Ram M.D. and continued on treatment with Oxycodone and Lyrica.   · 9/13/17 - Patient was hospitalized at [facility] for a day with dizziness secondary to dehydration, hypokalemia and anemia. She was given 1 unit of packed red blood cells and electrolytes were replaced. Chest x-ray revealed a subtle opacity in the left lateral lung base favored to represent atelectasis. Magnesium 1.3 (1.5-2.5), patient continued on replacement.    · 9/22/17 - Chemotherapy and magnesium replacement continued with decrease in chemotherapy dose by 10% secondary to cytopenias.   · 9/25/17 - Cycle 9 chemotherapy started.   · 10/12/17 - CT of the chest with contrast showed several tiny pulmonary nodules. One within the right lower lobe appears new from prior exams. Two adjacent foci of nodularity within the medial right lower lobe suggestive of minor infectious or inflammatory process. CT of the abdomen and pelvis with contrast which showed soft tissue nodules along the anterior abdominal  and pelvic walls unchanged from previous scan. Also a partially calcified mass within the left pelvis unchanged. No acute process identified within the abdomen or pelvis. Several left paracentral ventral hernias unchanged. No evidence of acute bowel obstruction.   · 10/16/17 - Order written for Levaquin 500 mg p.o. daily as the patient states that she has had a productive cough, fever and chills and with the results of the CT scan showing a possible infectious versus inflammatory process. Order also written to continue chemotherapy and magnesium replacement as previously prescribed.   · 10/23/17 - Cycle 10 chemotherapy started.   · 11/19/17 - Patient went to the Emergency Room at [facility] complaining of right lower extremity redness and fever for a day. Venous Doppler had no evidence of a DVT and patient was discharged home on Clindamycin.   · 11/21/17 -  of 17 (0-35).   · 12/4/17 - Cycle 11 chemotherapy started.   · 12/20/17 - PET scan with multiple hypermetabolic soft tissue nodules abutting the anterior abdominal wall, stable from previous examination. Peripherally calcified left lower quadrant mass near the ascending colon stable in size demonstrating no hypermetabolic uptake. Previously had maximum SUV of 7. Right medial basilar pulmonary nodules resolved.   · 12/22/17 - CT left lower extremity with soft tissue swelling with skin thickening present along the anterior and medial aspect of the leg, slightly more pronounced around the palpable marker seen within the upper medial aspect of the lower extremity.   · 12/26/17 - Elastic stockings prescribed for lower extremity edema.   · 1/8/18 - Patient hospitalized at [facility] between 1/8/18 and 1/11/18 with fever of up to 101 degrees and painful rash on the lower extremities of several weeks’ duration. She was found to be hypokalemic and MRI of the lower extremities revealed thickening and swelling. Chest x-ray had no acute cardiopulmonary abnormality. Skin biopsy was  performed by Surgery revealing stasis dermatitis and no evidence of malignancy. Infectious Disease consultation was obtained and IV antibiotics were stopped. Blood cultures had no growth.   · 1/22/18 - Patient asked to start wearing elastic stockings which she has not started yet. She was given a prescription for Dyazide 1 p.o. daily.   · 2/26/18 - Ordered chemo to resume again. Patient unaware that she was supposed to resume chemo after her last visit in January. Continue magnesium infusions on day 1, 8 and 15. Prescribed Levaquin 500 mg p.o. daily x10 days for productive cough x1 week. History of viral illness consistent with influenza.  of 18 (0-35). Chest x-ray with no acute process.    · 3/5/18 - Cycle 12 chemotherapy started.   · 3/26/18 - Options discussed with patient. Decision made to discontinue IV Gemzar and orders written to start on Niraparib (Zejula) 300 mg p.o. daily as maintenance therapy.   · 4/11/18 - Niraparib discontinued due to insurance denial. Orders written to start Carboplatin-AUC 5 IV day 1 cycling every 21 days. Orders written for Neulasta 6 mg subcutaneous kit day 1 with palliative treatment intent and expected duration of treatment three months.   · 4/12/18 - CT chest, abdomen and pelvis with contrast revealed numerous tiny centrilobular nodules in the lungs favored to reflect infectious or inflammatory process. Nodules ranged 1-3 mm in size and are new from prior studies with metastatic disease not entirely excluded. Stable small hiatal hernia. Interval progression of metastatic disease involving the subcutaneous tissues at the ventral abdominal wall. Multiple soft tissue density nodules previously shown to be hypermetabolic on PET scan. New small crescent of low attenuation material along the periphery of the spleen with similar finding at the dome of the liver. Findings are concerning for peritoneal metastases. Density of the dome of the liver remained unchanged from multiple  prior studies. Stable calcified mass in the left pelvic sidewall. Urinary bladder wall thickening.   · 4/23/18 - Cycle 1 chemotherapy with Carboplatin administered along with Neulasta injection.   · 4/26/18 - Neulasta discontinued due to insurance denial.   · 5/14/18 - Patient received cycle 2 Carboplatin.   · 6/5/18 - Cycle 3 day 1 chemotherapy with Carboplatin administered.   · 6/20/18 - CT chest, abdomen and pelvis at [facility] showed re-demonstration of soft tissue mass in the ventral wall hernia left of the midline as well as the dome of the liver, likely representing metastatic disease similar as compared to the prior study. Additional calcified lesions present in the upper left hemipelvis and along the anterior abdominal wall to the right of the midline also likely representing metastatic disease. No definite new lesions were identified. Moderate to large stool burden likely related to mild constipation was present. No suspicious lung nodules were identified. The previously-described ground glass nodules appeared to have resolved. There was a stable subpleural nodule in the right upper lobe measuring 3 mm present since 2014 without significant changes.   · 6/26/18 - Cycle 4 day 1 Carboplatin administered with addition of Neulasta for febrile neutropenia prophylaxis.   · 6/29/18 - Reviewed CT scans with patient. Orders written to discontinue Carboplatin and Neulasta and plan to start Niraparib 300 mg by mouth daily as maintenance therapy. Prescribed Amlodipine 2.5 mg p.o. daily for chemo-induced hypertension.   · 7/18/18 - Orders written to increase weekly Magnesium Sulfate to 3 g IV weekly due to continued hypomagnesemia.   · 8/1/18 - Patient reports she has not received Niraparib (Zejula) to date.   · 8/30/18 - Patient’s phone was not working so though Niraparib approved, the drug company had not been able to get ahold of her. Patient supplied with drug company number so that she can start taking  the pills.   · 9/6/18 -  of 20 (N).   · 9/18/18 - Urinalysis done for dysuria and back pain. Urine culture grew 20,000 to 50,000 colonies of urogenital ruy. Prescribed Bactrim DS one tablet twice daily for one week.   · September 2018 - Patient initiated on Zejula 300 mg p.o. daily.   · 10/1/18 - WBC 3.5, hemoglobin 12, platelet count 74,000. Niraparib (Zejula) dose held and then resumed when platelets above 100,000 at a dose of 200 mg daily.   · 10/15/18 - Patient received Niraparib (Zejula) at 200 mg p.o. daily with a platelet count of 198,000.   · 11/7/18 - WBC 6, hemoglobin 11.6, MCV 96.2, platelet count 137,000. Patient claims to have stopped taking Niraparib a few days prior because of cough. Asked to resume taking it. Ciprofloxacin 500 mg p.o. twice daily for one week for bronchitis.   · 12/3/18 - Magnesium Sulfate infusions changed to every two weeks, to send mag level before and give 3 grams. DC’d Magnesium Oxide and started Magnesium Plus Protein two pills twice daily.   · 1/4/19 - CT chest, abdomen and pelvis with new patchy nodular areas of airspace consolidation within the bilateral upper lobes, left greater than right, favored to be infectious or inflammatory. Interval enlargement of the peritoneal soft tissue masses within the pelvis compatible with progression of disease. Enlarging ventral hernia now containing multiple loops of small bowel and colon.   · 1/7/19 - Patient has not been coming in for weekly magnesium replacement as nursing has not been able to get ahold of her. Results of CT’s discussed with patient. Decision made to change her to IV chemotherapy with Carboplatin AUC-5 day 1 and pegylated liposomal Doxorubicin (Doxil) 30 mg/M2 IV day 1 to cycle every four weeks. Patient made aware of the limited choices of her treatment available.   · 1/31/19 - Echocardiogram showed LVEF 50-55% and otherwise normal echo Doppler study.   · 2/4/19 - New chemotherapy not initiated to date with  difficulty contacting patient for echocardiogram. Neulasta On-Pro 6 mg ordered with chemotherapy due to extensive prior exposure to chemotherapy, increasing risk of febrile neutropenia, history of neutropenic events on prior chemotherapy regimens.   · 2/15/19 - Patient started cycle 1 chemotherapy with Neulasta support.   · 3/6/19 -  of 28 (0-35).   · 3/15/19 - Cycle 2 Carboplatin with Liposomal Doxorubicin (Doxil) and Neulasta support initiated.     · 4/10/19 - Patient was requested to followup with Dr. Queen regarding hypotension and blood pressure medication dosing. Patient appears to be tolerating treatment well with cytopenias not requiring dose adjustments. No Doxil-related skin or mucus membrane toxicities or other significant toxicities to date.   · 4/12/19 - Cycle 3 chemotherapy given.   · 4/16/19 - CT chest, abdomen and pelvis with no evidence of active metastasis to the chest. Several tree-in-bud nodules within the periphery of the inferior right upper lobe likely infectious or inflammatory. Disease burden within abdomen and pelvis stable to slightly increased from prior CT. Specifically, a soft tissue mass along the right rectus muscle slightly increased in bulk. Multiple ventral hernias, some containing loops of bowel, with no evidence of acute bowel obstruction.   · 5/8/19 - Discussed CT results with patient. Overall stable disease and would like to continue same treatment. Dove soap and Hydrocortisone Cream p.r.n. prescribed for scattered rash on back.   · 5/10/19 - Cycle 4 Carboplatin and Liposomal Doxorubicin initiated.   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 It was ER positive, HER2/zuleima negative, PD-L1 negative. There was TOPO1 positivity and TUBB3 positivity. TMB was low (two mutations per megabyte MUTS/MB) and MSI was stable. There were 13 therapies considered with increased benefit. The 13 therapies with increased  benefit included Fulvestrant, Irinotecan, Letrozole, Topotecan, Anastrazole, Exemestane, Tamoxifen, all of which were referenced to [facility] as well as Abemaciclib, Everolimus, Gefitinib, Palbociclib, Ribociclib and Toremifene.     · 6/5/19 echocardiogram with preserved LV systolic function with EF around 60%.  · 6/7/19 cycle 5 chemotherapy with Neulasta support given.  Patient continued on mag oxide 400 mg p.o. twice daily and weekly IV 3 g mag sulfate infusions for persistent hypomagnesemia.  · 7/5/2019- cycle 6 chemotherapy with carboplatin and doxorubicin with Neulasta port initiated.  Ca125:  21 (0-35).  · 7/23/2019-CT chest abdomen and pelvis compared to CT from 4/16/2019 revealed multiple small pulmonary nodules unchanged from prior examination within the right upper and left lower lobes.  Complex ventral hernia similar to prior exam.  Soft tissue nodule along the right lateral margin of the right of the midline measuring 12 x 10 mm unchanged in size.  Irregular soft tissue nodular thickening along the margin of the most inferior aspect of the complex ventral hernia with thickness measuring up to 13 mm similar to prior examination.  Extensive calcified and soft tissue mass within the left lower quadrant decrease in size now measuring 2.6 x 2.3 cm previously 3.0 x 2.5 cm.  Partially calcified mass involving the right rectus sheath measuring 3.1 x 2.7 cm previously measured 3.3 x 2.9 cm.  Densely calcified mass measuring 2.1 x 1.6 cm unchanged previously measured 2 x 1.7 cm.  Right external iliac chain lymph node measuring 9 mm previously measured 5 mm.  · 8/2/2019 cycle 7 chemotherapy given.  · 8/30/2019-cycle 8 chemotherapy with carboplatin and doxorubicin with Neulasta support given.  · 9/27/2019 cycle 9 chemotherapy given.  · 10/1/19 - Echocardiogram showed Normal LV size and contractility EF of 60-65%. Normal RV size. Borderline left atrial enlargement. Aortic valve, mitral valve, tricuspid valve appears  structurally normal, no significant regurgitation seen.No pericardial effusion seen. Proximal aorta appears normal in size.  · 10/18/19 - Magnesium 1.5 (1.8-2.5).  IV magnesium replacement continued.  · 10/25/2019 cycle 10 chemotherapy given.  · 10/29/2019 patient had CT scan of the chest abdomen and pelvis-there is a new 2 mm nodule in the left lower lobe with a stable 2 mm nodule in the left lower lobe.  Follow-up in 6 months was recommended.  Stable multiple soft tissue density and calcified nodules within the ventral abdominal wall and left retroperitoneal fat consistent with nonviable metastatic disease.  These nodules have either decreased in size or stable.  · 11/22/2019 10 received cycle 11 of chemotherapy with Doxil and carboplatinum with Neulasta  · 12/8/2019 to 12/11/2019 patient was admitted to the hospital for neutropenic fever.  · 12/20/2019 patient received cycle 12 of chemotherapy with Doxil and carboplatinum with Neulasta  · 1/13/20: 2 D echo was normal with EF 56% to 60%  · 1/24/20-Patient received cycle 13 of combination chemotherapy with carboplatinum and Doxil  · 2/3/2020 patient had a  which was 25.4  · 2/21/2020-patient received cycle 14 of chemotherapy with carboplatinum and Doxil  · 3/6/2020 patient had CT scan of the chest, abdomen and pelvis this revealed a 3 mm nodule in the left lower lobe present on prior CT of the abdomen unchanged from July 2019.  Stable 3 mm right upper lobe nodule.  There is a lymph node in the AP window measuring 8 x 9 mm prominent left hilar lymph node measuring 12 mm previously was 7 x 7 mm no significant adenopathy was seen in the abdomen there is an area of irregular soft tissue in the left paramidline ventral hernia which is stable measuring 5.7 cm partially calcified mass in the right rectus measuring 3 x 2.5 cm which is also stable the prominent lymph nodes in the left mid hilum and mediastinum may be reactive or metastatic disease  · 4/17/2020-patient  had cycle 15 of single agent carboplatinum  · 5/26/2020 patient had CT scan of the chest, abdomen and pelvis showed 3 new lung nodules measuring 7 mm in the left upper lobe, 5 mm in the left lower lobe and 4 mm in the right lower lobe.  There were additional small lung nodules which were unchanged.  Mildly prominent mediastinal and bilateral hilar lymph nodes mildly increased in size.  Right hilar node 9 mm previously was 5 mm.  Carinal node 9 mm previously was 6 mm.  The nodule at the right side of the hernia sac has increased to 1.5 cm from 1.2 cm.  Also a nodule in the subcutaneous fat currently measures 2.4 was previously 2 cm.  · 5/15/2020-patient received cycle 16 of carboplatinum  · Since the last visit in the office patient patient developed dizzy spell and fell. For that reason she was taken to the emergency room at [place].    · 7/3/2020 she had brain MRI which showed an 8 mm focus of abnormality in the left aspect of the posterior fossa corresponding to a dural based enhancing lesion thought to represent a calcified meningioma vessels calcified dural based metastasis.  This lesion has a slight local mass-effect and a small amount of surrounding edema.  There is also a 4 to 5 mm rounded focus of abnormal nodular enhancement along the cortex of the left parietal lobe but no significant mass-effect.  This is nonspecific could represent neoplastic process, infectious/inflammatory process or granulomatous disease.  · Patient was seen by neurosurgery, follow-up brain MRI in 8 weeks has been recommended by Dr. Hartman  · 7/9/2020 patient was initiated on single agent topotecan cycle 1 day 1  · 7/16/2020 she received the 8 topotecan  · 8/6/2020 patient received cycle 1 day 15 of topotecan  · 9/2/2020 patient had brain MRI multiple hospital which showed interval increase in size of the metastasis in the left parietal lobe now measuring 9 x 7 mm.  Previously was 5 mm.  There has been interval increase in size of the  left superior cerebellar metastases now measuring 1.3 cm previously was 8 mm.  There was no new metastatic disease identified.  · 9/11/2020 patient received cycle 2-day 15 of single agent topotecan  · Was admitted to the hospital 10/1/2020 for supratherapeutic INR  · 10/13/2020 patient was seen by Dr. Delong she has started stereotactic brain radiation to be completed on 10/28/2020  · 11/6/2020 patient received cycle 3-day 1 of chemotherapy with topotecan  · 11/13/2020 patient received cycle 3-day 8 of topotecan  · 11/30/2020 patient had CT scan of the chest, abdomen and pelvis this showed new reticular nodular interstitial thickening within the right lung mostly in the right middle lobe and right lower lobe worrisome for lymphangitic spread of cancer.  Evidence of progression of metastatic disease in the chest, abdomen and pelvis the new tiny sclerotic foci within the spine worrisome for osseous metastatic disease.  · 12/17/2020 - Discontinued topotecan due to progressive disease with orders written to begin Alimta 500 mg per metered squared IV q. 21 days  · 12/30/2020 - Started cycle 1 day 1 Alimta   · 1/18/2021 patient had bone scan which showed chest metastatic disease.  Xgeva has been ordered     2. Deep vein thrombosis of left upper extremity diagnosis established in October of 2013.  · 10/16/13 - Venous Doppler of left upper extremity.  Impression:  Deep vein thrombosis involving the subclavian, axillary and brachial veins on the left side, superficial vein thrombosis involving the left basilic vein.  · 10/16/13 - Order written for Lovenox 120 mg subcutaneously daily until INR is in therapeutic range.  Order written for Coumadin 5 mg p.o. daily.  The patient is to follow PT and INR protocol.  · 5/20/16 - Venous Doppler bilateral lower extremities normal.  · 7/30/19 - Patient continued on Coumadin following the INR protocol.      3. Anemia diagnosis established in November 2013 and pernicious anemia diagnosis  established March 2016.   · 11/15/13 - Hemoglobin is 7.8.  · 12/2/13 - Orders written to start Aranesp 200 mg subcutaneous every two weeks due to low hemoglobin secondary to chemo induced anemia.  Hemoglobin is 9.7.  Anemia workup is ordered.  · 12/03/13 - Ferritin 179.8 (N), folic acid 8.3 (N), vitamin B12 314, iron 104 (N), TIBC 298 (N), iron saturation 35 (N), Reticulocyte count 0.88 (N).  EPO level 124 (N).  Haptoglobin 22 (H).  · 10/22/14 - Hemoglobin 12.5, hematocrit 37.3, MCV 91.6.  · 10/23/14 - Anemia resolved.   · 3/8/16 - WBC 5.8, hemoglobin 11.7, MCV 90.3, platelet count 245,000. Vitamin B12 of 189 (180-914), ferritin 61 (), iron 91 (), TIBC 345 (228-428).   · 3/15/16 -  ().        · 3/22/16 - Intrinsic factor antibody positive, antiparietal antibody negative. Vitamin B12 at 1000 mcg IM weekly started, but the patient after receiving first dose on 3/22/16 did not come back for injections until 4/13/16.   · 4/13/16 - WBC 5.1, hemoglobin 12.5, MCV 87.9, platelet count 201,000. Patient given B12 injection and then continued at home.   · 5/10/16 - WBC 5.1, hemoglobin 12.5, platelet count 201,000.   · 6/14/16 - Monthly Vitamin B12 injections continued at home.   · 7/11/16 - WBC 5.48, hemoglobin 12.7, MCV 86.2, platelet count 200,000.   · 8/16/16 - Patient continued on monthly Vitamin B12 injections at home.   · 1/5/17 - WBC 2.6 with 38% neutrophils, 56% lymphocytes, 5% monocytes, hemoglobin 10.5, .3, platelet count 63,000. Patient continued on Vitamin B12 injections 1000 mcg IM monthly at home.   · 3/20/17 - Retic 0.41 (0.5-1.5), creatinine 1.0 (0.4-1.0). Iron 12 (), TIBC 270 (228-428), ferritin 259 (), haptoglobin 340 (), TSH 0.43 (0.34-5.6), folate 6.0 (5.9-24.8). Serum protein electrophoresis revealed decreased gammaglobulins. Serum EDU had no monoclonal gammopathy identified. Stool Hemoccult was negative.   · 6/8/17 - WBC 6.3, hemoglobin 8.3, MCV 92.4,  platelet count 50,000. Procrit 40,000 units subq weekly added for chemotherapy-induced anemia. Patient continued on Vitamin B12 at 1000 mcg IM monthly at home.   · 7/5/17 - Iron 33 (), TIBC 328 (228-428), ferritin 211 (), TSH 2.46 (0.34-5.6).       · 7/31/17 - WBC 5.0, hemoglobin 11.2, MCV 89.7, platelet count 217,000. We will continue with the Procrit 40,000 units subq weekly for chemo-induced anemia when the hemoglobin falls below 10.0 and the patient is also to continue with the Vitamin B12 at 1000 mcg IM monthly at home. Creatinine 1.24 (0.5-0.99).    · 8/28/17 - WBC 9.6, hemoglobin 8.7, MCV 93.7, platelet count 133,000. Patient is to continue with the Procrit 40,000 units subq weekly and will receive that today. She is also to continue with the Vitamin B12 at 1000 mcg IM monthly at home.  · 10/16/17 - WBC 7.5, hemoglobin 9.6, MCV 98.4, platelet count 195,000. Patient is to continue with Procrit 40,000 units subq weekly and will receive Procrit today.   · 1/1/18 - Creatinine 1.3 (0.4-1.0).    · 2/19/18 - Procrit given for hemoglobin 9.9.   · 2/26/18 - Continue Procrit 40,000 units weekly p.r.n. hemoglobin <10. Continue Vitamin B12 at 1000 mcg IM monthly at home.   · 3/26/18 - Hemoglobin 8.3. Procrit dose increased to 60,000 units weekly. Iron 29 (), TIBC 306 (228-428), and iron sat 9% (15-50). Iron 29 (), TIBC 306 (228-428), iron saturation 9% (15-50).   · 3/27/18 - Order written to start Ferrous sulfate 325 mg p.o. b.i.d.    · 4/2/18 - Stool heme negative x3.   · 4/30/18 - Patient had not started Ferrous sulfate 325 mg p.o. b.i.d. and verbalized understanding to do so and to notify the office if side effects are not tolerable.   · 5/24/18 - Patient was hospitalized at [facility] between 5/24/18 and 5/26/18 with dark tarry stool. INR was subtherapeutic. She was given IV Protonix and Protonix 40 mg daily. She underwent an EGD by David Rogers M.D. revealing small hiatal hernia, small  submucosal nodule near the cardia, erosive gastritis. Pathology on gastric antrum and body biopsy revealed iron pill gastritis and no evidence of Helicobacter pylori. She then underwent a colonoscopy revealing diverticulosis, hemorrhoids and surgical changes from previous resection. It was recommended to consider outpatient M2A if hemoglobin continued to drop.   · 6/4/18 - Order written to discontinue iron pills and to use Injectafer 750 mg IV days 1 and 8 for low iron sats. Order written for Procrit 40,000 units subq weekly. Iron 65 (), TIBC 328 (228-428), iron saturation 20% (15-50), ferritin 123 ().   · 6/29/18 - Discussed patient’s intolerance of oral iron due to gastritis. Oral iron discontinued with plans for Injectafer should iron level drop in the future.   · 11/7/18 - Patient claims not to be taking Vitamin B12 injections at home. Asked to resume those.   · 12/3/18 - Patient reports she has not been taking her B12 injections for a couple of months. She needs some syringes and needles for that.   · 2/4/19 - Patient was uncertain if she is currently taking ferrous Sulfate or not. Patient with history of intolerance and will follow hemoglobin.   · 5/8/19 - Ferritin 64 ().  · 8/27/2019- iron 52 (), iron saturation 14% (15-50), TIBC 364 (228-420), and ferritin 74 ().  Injectafer 750 mg IV day 1 and 8 due to oral iron intolerance ordered.  · 8/30/2019- Injectafer 750 mg IV day 1 given.  Hemoglobin 10.8.  At the end of the infusion heart rate was 48 and the patient reported mild dizziness.  Patient was sent to the ED for evaluation with symptoms resolving rapidly.  Chest x-ray and CT head were negative for acute findings.  · 9/6/2019-Injectafer 750 mg IV day 8 given without adverse effects.  Hemoglobin 9.8.   · 9/25/19 - Iron 85 (). Iron saturation 25 (15-50). TIBC 335 (228-428). Ferritin  694 ().  Hemoglobin 10.3.  · 10/25/2019 hemoglobin 9.7.  Patient started on Retacrit  40,000 units subcu weekly.  · 12/17/2020 hemoglobin 11.9, hematocrit 38.3     Past Medical History:   Diagnosis Date   • Anemia 2013   • Cervical disc disorder 2013    Herniated disc, pinched nerve   • Clotting disorder (CMS/HCC) 2013    Low platelets from chemo   • Colon polyp 2013   • Deep vein thrombosis (CMS/HCC) 2013   • Diverticulosis 2013   • GERD (gastroesophageal reflux disease) 2016   • HL (hearing loss) 2016    I need hearing aids   • Hyperlipidemia 2013    Need my cholesterol rechecked   • Hypertension    • Joint pain 2013    Shoulder pain, torn rotator cuff   • Low back pain 2013   • Neuromuscular disorder (CMS/[facility]) 2015    Shingles, pinched nerves in my neck   • Osteopenia 2014   • Osteoporosis    • Ovarian cancer (CMS/[facility])     ovarian   • Ovarian cancer on left (CMS/[facility]) 1995   • Pneumonia 2010    Have had it several times   • Spinal stenosis 2013    Cervical   • Urinary tract infection 2013    Have had several utis       Past Surgical History:   Procedure Laterality Date   • BRONCHOSCOPY N/A 2/10/2021    Procedure: BRONCHOSCOPY with bronchioalveolar lavage;  Surgeon: Jerica Esparza MD;  Location: [facility] ENDOSCOPY;  Service: Pulmonary;  Laterality: N/A;  post op:right lobe pneumonia   • CATARACT EXTRACTION     • COLON SURGERY     • COLONOSCOPY  05/26/2018   • EXPLORATORY LAPAROTOMY     • HERNIA REPAIR     • HYSTERECTOMY     • OOPHORECTOMY           Current Outpatient Medications:   •  acetaminophen (TYLENOL) 500 MG tablet, Take 1,000 mg by mouth Every 6 (Six) Hours As Needed for Mild Pain ., Disp: , Rfl:   •  albuterol sulfate  (90 Base) MCG/ACT inhaler, Inhale 2 puffs Every 4 (Four) Hours As Needed for Wheezing., Disp: 18 g, Rfl: 0  •  atorvastatin (LIPITOR) 20 MG tablet, Take 20 mg by mouth every night at bedtime., Disp: , Rfl: 3  •  azelastine (ASTELIN) 0.1 % nasal spray, 2 sprays into the nostril(s) as directed by provider Daily. Use in each nostril as directed, Disp: 30 mL, Rfl: 0  •   benzonatate (Tessalon Perles) 100 MG capsule, Take 1 capsule by mouth 3 (Three) Times a Day As Needed for Cough., Disp: 30 capsule, Rfl: 0  •  calcium carbonate (Calcium 600) 600 MG tablet, Take 1 tablet by mouth 2 (two) times a day., Disp: 60 tablet, Rfl: 11  •  cetirizine (zyrTEC) 10 MG tablet, Take 1 tablet by mouth Every Night., Disp: 30 tablet, Rfl: 0  •  cyanocobalamin 1000 MCG/ML injection, Inject 1,000 mcg into the appropriate muscle as directed by prescriber Every 28 (Twenty-Eight) Days., Disp: , Rfl:   •  famotidine (PEPCID) 40 MG tablet, TAKE 1 TABLET BY MOUTH EVERY MORNING BEFORE BREAKFAST, Disp: 90 tablet, Rfl: 1  •  magnesium oxide (MAG-OX) 400 MG tablet, TAKE 1 TABLET BY MOUTH TWICE A DAY, Disp: 180 tablet, Rfl: 1  •  montelukast (Singulair) 10 MG tablet, Take 1 tablet by mouth Every Night., Disp: 30 tablet, Rfl: 0  •  oxyCODONE (ROXICODONE) 10 MG tablet, Take 1 tablet by mouth Every 6 (Six) Hours As Needed for Moderate Pain ., Disp: 120 tablet, Rfl: 0  •  potassium chloride (K-DUR,KLOR-CON) 20 MEQ CR tablet, Take 60 mEq by mouth Every Morning., Disp: , Rfl:   •  potassium chloride (K-DUR,KLOR-CON) 20 MEQ CR tablet, Take 40 mEq by mouth Every Night., Disp: , Rfl:   •  pregabalin (LYRICA) 100 MG capsule, Take 1 capsule by mouth 3 (Three) Times a Day., Disp: 90 capsule, Rfl: 2  •  sertraline (ZOLOFT) 50 MG tablet, Take 50 mg by mouth Every Night., Disp: , Rfl:   •  temazepam (RESTORIL) 30 MG capsule, TAKE 1 CAPSULE BY MOUTH AT NIGHT AS NEEDED FOR SLEEP, Disp: 30 capsule, Rfl: 2  •  triamterene-hydrochlorothiazide (DYAZIDE) 37.5-25 MG per capsule, TAKE 1 CAPSULE BY MOUTH EVERY DAY, Disp: 90 capsule, Rfl: 1  •  warfarin (COUMADIN) 5 MG tablet, Take 1 tablet by mouth Daily. At 1700 as directed, Disp: 30 tablet, Rfl: 0  No current facility-administered medications for this visit.    Allergies   Allergen Reactions   • Morphine Nausea Only and Hives   • Penicillin V Potassium Rash   • Sulfa Antibiotics Rash  "  • Levaquin [Levofloxacin] Nausea Only and Dizziness     When taken with Coumadin   • Amoxicillin Rash   • Norco [Hydrocodone-Acetaminophen] Rash       Family History   Problem Relation Age of Onset   • Hypertension Mother    • Cancer Father    • Hypertension Father    • Hypertension Sister    • Hypertension Brother    • Stroke Brother        Cancer-related family history includes Cancer in her father.    Social History     Tobacco Use   • Smoking status: Never Smoker   • Smokeless tobacco: Never Used   Vaping Use   • Vaping Use: Never used   Substance Use Topics   • Alcohol use: No     Comment: caffeine 32-64oz qd   • Drug use: No     HPI, ROS and PFSH have been reviewed and confirmed on 3/10/2021.     SUBJECTIVE:    Patient is here today for follow-up.  She has no new issues.  We have reviewed her CT scans.      REVIEW OF SYSTEMS:    Review of Systems   Constitutional: Negative for chills and fever.   HENT: Negative for ear pain, mouth sores, nosebleeds and sore throat.    Eyes: Negative for photophobia and visual disturbance.   Respiratory: Negative for wheezing and stridor.    Cardiovascular: Negative for chest pain and palpitations.   Gastrointestinal: Negative for abdominal pain, diarrhea, nausea and vomiting.   Endocrine: Negative for cold intolerance and heat intolerance.   Genitourinary: Negative for dysuria and hematuria.   Musculoskeletal: Negative for joint swelling and neck stiffness.   Skin: Negative for color change and rash.   Neurological: Negative for seizures and syncope.   Hematological: Negative for adenopathy.        No obvious bleeding   Psychiatric/Behavioral: Negative for agitation, confusion and hallucinations.     OBJECTIVE:  Vitals:    03/10/21 0858   BP: 146/89   Pulse: 70   Resp: 16   Temp: 97.5 °F (36.4 °C)   Weight: 81.1 kg (178 lb 12.8 oz)   Height: 165.1 cm (65\")   PainSc: 0-No pain     Temp 97.5   Pulse 64  RR 18  /92  Height 165.1 cm   Weight 81.6 kg   BSA 1.89  BMI " 30    ECOG  (1) Restricted in physically strenuous activity, ambulatory and able to do work of light nature    Physical Exam   Constitutional: She is oriented to person, place, and time. No distress.   Obese    HENT:   Head: Normocephalic and atraumatic.   Mouth/Throat: Mucous membranes are moist.   Eyes: Conjunctivae are normal. Right eye exhibits no discharge. Left eye exhibits no discharge. No scleral icterus.   Neck: No thyromegaly present.   Cardiovascular: Normal rate, regular rhythm and normal heart sounds. Exam reveals no gallop and no friction rub.   Pulmonary/Chest: Effort normal. No stridor. No respiratory distress. She has no wheezes.   Left chest wall port    Abdominal: Soft. Bowel sounds are normal. She exhibits no mass. There is no abdominal tenderness. There is no rebound and no guarding.   Musculoskeletal: Normal range of motion. No tenderness.   Lymphadenopathy:     She has no cervical adenopathy.   Neurological: She is alert and oriented to person, place, and time. She exhibits normal muscle tone.   Skin: Skin is warm and dry. She is not diaphoretic. No erythema.   Psychiatric: Her behavior is normal. Mood normal.   Nursing note and vitals reviewed.    I have reexamined the patient and the results are consistent with the previously documented exam. Cindy Ball MD     RECENT LABS    WBC   Date Value Ref Range Status   03/10/2021 6.05 3.40 - 10.80 10*3/mm3 Final   07/05/2020 4.04 (L) 4.5 - 11.0 10*3/uL Final     RBC   Date Value Ref Range Status   03/10/2021 3.59 (L) 3.77 - 5.28 10*6/mm3 Final   07/05/2020 3.68 (L) 4.0 - 5.2 10*6/uL Final     Hemoglobin   Date Value Ref Range Status   03/10/2021 10.5 (L) 12.0 - 15.9 g/dL Final   07/05/2020 11.5 (L) 12.0 - 16.0 g/dL Final     Hematocrit   Date Value Ref Range Status   03/10/2021 34.0 34.0 - 46.6 % Final   07/05/2020 35.3 (L) 36.0 - 46.0 % Final     MCV   Date Value Ref Range Status   03/10/2021 94.7 79.0 - 97.0 fL Final   07/05/2020 95.9  80.0 - 100.0 fL Final     MCH   Date Value Ref Range Status   03/10/2021 29.2 26.6 - 33.0 pg Final   07/05/2020 31.3 26.0 - 34.0 pg Final     MCHC   Date Value Ref Range Status   03/10/2021 30.9 (L) 31.5 - 35.7 g/dL Final   07/05/2020 32.6 31.0 - 37.0 g/dL Final     RDW   Date Value Ref Range Status   03/10/2021 19.4 (H) 12.3 - 15.4 % Final   07/05/2020 15.9 12.0 - 16.8 % Final     RDW-SD   Date Value Ref Range Status   03/10/2021 64.5 (H) 37.0 - 54.0 fl Final     MPV   Date Value Ref Range Status   03/10/2021 9.9 6.0 - 12.0 fL Final   07/05/2020 10.2 6.7 - 10.8 fL Final     Platelets   Date Value Ref Range Status   03/10/2021 248 140 - 450 10*3/mm3 Final   07/05/2020 158 140 - 440 10*3/uL Final     Neutrophil Rel %   Date Value Ref Range Status   07/05/2020 54.0 45 - 80 % Final     Neutrophil %   Date Value Ref Range Status   03/10/2021 61.6 42.7 - 76.0 % Final     Lymphocyte Rel %   Date Value Ref Range Status   07/05/2020 30.4 15 - 50 % Final     Lymphocyte %   Date Value Ref Range Status   03/10/2021 21.0 19.6 - 45.3 % Final     Monocyte Rel %   Date Value Ref Range Status   07/05/2020 12.4 0 - 15 % Final     Monocyte %   Date Value Ref Range Status   03/10/2021 15.5 (H) 5.0 - 12.0 % Final     Eosinophil %   Date Value Ref Range Status   03/10/2021 1.2 0.3 - 6.2 % Final   07/05/2020 3.0 0 - 7 % Final     Basophil Rel %   Date Value Ref Range Status   07/05/2020 0.2 0 - 2 % Final     Basophil %   Date Value Ref Range Status   03/10/2021 0.7 0.0 - 1.5 % Final     Immature Grans %   Date Value Ref Range Status   07/05/2020 0.0 0 % Final     Neutrophils Absolute   Date Value Ref Range Status   07/05/2020 2.18 2.0 - 8.8 10*3/uL Final     Neutrophils, Absolute   Date Value Ref Range Status   03/10/2021 3.73 1.70 - 7.00 10*3/mm3 Final     Lymphocytes Absolute   Date Value Ref Range Status   07/05/2020 1.23 0.7 - 5.5 10*3/uL Final     Lymphocytes, Absolute   Date Value Ref Range Status   03/10/2021 1.27 0.70 - 3.10  10*3/mm3 Final     Monocytes Absolute   Date Value Ref Range Status   07/05/2020 0.50 0.0 - 1.7 10*3/uL Final     Monocytes, Absolute   Date Value Ref Range Status   03/10/2021 0.94 (H) 0.10 - 0.90 10*3/mm3 Final     Eosinophils Absolute   Date Value Ref Range Status   07/05/2020 0.12 0.0 - 0.8 10*3/uL Final     Eosinophils, Absolute   Date Value Ref Range Status   03/10/2021 0.07 0.00 - 0.40 10*3/mm3 Final     Basophils Absolute   Date Value Ref Range Status   07/05/2020 0.01 0.0 - 0.2 10*3/uL Final     Basophils, Absolute   Date Value Ref Range Status   03/10/2021 0.04 0.00 - 0.20 10*3/mm3 Final     Immature Grans, Absolute   Date Value Ref Range Status   07/05/2020 0.00 <1 10*3/uL Final     nRBC   Date Value Ref Range Status   03/01/2021 0.2 0.0 - 0.2 /100 WBC Final       Lab Results   Component Value Date    GLUCOSE 112 (H) 03/04/2021    BUN 12 03/04/2021    CREATININE 0.82 03/04/2021    EGFRIFNONA 70 03/04/2021    EGFRIFAFRI >60 06/07/2019    BCR 14.6 03/04/2021    K 3.3 (L) 03/04/2021    CO2 27.0 03/04/2021    CALCIUM 8.8 03/04/2021    ALBUMIN 3.70 03/04/2021    LABIL2 1.3 07/03/2020    AST 28 03/04/2021    ALT 9 03/04/2021     ASSESSMENT:    1. Recurrent ovarian cancer metastases to the brain was on carboplatinum, Doxil.  Patient had had carboplatinum Taxol, carbo Taxotere, Gemzar single agent, niraparib and was on Doxil and carboplatin.  Doxil was discontinued due to approaching of lifetime dosing.  Progressed on single agent topotecan and Alimta.  Refer to paradigm testing for future options at time of progression.  Patient has had progression of disease and therefore platinum was discontinued. On exam today I felt a right lower abdominal mass which is new.  She has CT scan of the chest, abdomen and pelvis on 3/1/2021 this showed moderate size pericardial effusion, scattered pulmonary nodules with septal thickening worse in the right lung compared to the left.  Pathologically enlarged hilar and mediastinal  lymph nodes were suspicious for metastatic disease.  CT of the abdomen and pelvis showed increase in size of multiple soft tissue masses involving the anterior abdominal wall likely related to worsening of metastatic disease.  Increase in size of multiple sclerotic lesions within the lumbar spine and pelvis.  A 2D echocardiogram which did not reveal any significant pericardial effusion on 3/4/2021.  2. Patient has a subcutaneous nodule noted on the anterior abdominal wall consistent with metastatic disease currently measures 3.5 cm.  We will continue to follow-up on this.  3. Patient has bone metastases therefore I recommended Xgeva 120 mg subcu monthly.  Reviewed the side effects, benefits in detail with patient.  She would also be initiated on Os-Johnny D 600 mg twice a day.  She will schedule with her dentist prior to initiating Xgeva.  4. Patient has evidence of disease progression in the chest, abdomen and pelvis now with bone involvement as well. Started Alimta on 12/30/2020.  Continue the same  5. Progressive brain metastasis: Status post stereotactic brain radiation under the care of Dr. Delong and recent MRI of the brain on 12/15/2020 shows probable progression.  Recent brain MRI did not show any obvious signs of progression.  6. Iron deficiency anemia: Intermittently on  Iron  7. B12 deficiency on parenteral B12 injections  8. History of DVT on anticoagulation therapy with warfarin: Warfarin has been held today due to supratherapeutic INR  9. Pancytopenia: Due to chemotherapy: On Procrit  10. Probable bone metastasis: As discussed above.  We will begin Xgeva  11. Hypomagnesemia: Continue to monitor levels and infuse as needed  12. Monitoring for chemotherapy toxicities on Alimta  13. Iron deficiency anemia   14. Recent supratherapeutic INR: Status post FFP transfusion.  INR is now in the therapeutic range.  Restart warfarin at 5 mg daily and repeat INR on 3/4/2021  15. Assessment has been reviewed and updated as  documented above    Discussion:    Reviewed her CT scans with patient.  She has progressive disease.  We will discontinue Alimta and begin combination of cis-platinum and Gemzar.  Reviewed the side effects in detail with patient to include but not limited to:  Chemotherapy side effects include, but not limited to, nausea, vomiting, bone marrow suppression, which can result in blood, platelet transfusion. There is also risk of permanent bone marrow destruction, which can cause myelodysplastic syndrome or leukemia years down the line. There is risk of infection which can result in hospitalization and even death. There is also risk of fatigue, asthenia, alopecia which could become permanent. Chemo will help to reduce risk of relapse of cancer, but does not eliminate risk completely.    Specifically cis-platinum can lead to renal insufficiency, electrolyte abnormalities, tinnitus, peripheral neuropathy.  Gemzar can also lead to significant thrombocytopenia, skin toxicity.        PLAN:    1. Discontinue Alimta due to progressive disease  2. Begin cis-platinum 30 mg/m2 IV day 1, Gemzar 600 mg/m2 IV days 1 and 8  every 28 days.  Scheduled to begin this week  3. INR  today  4. Reviewed bone scan results  5. Continue Xgeva 120 mg subcu monthly  6. Calcium carbonate with Vitamin D 600 mg twice a day  7. Continue folic acid 1 mg p.o. daily -reviewed needs to stay on drug to prevent side effects from Alimta   8. Continue B12 injections 1000 mcg IM monthly, next due 1/14/2021  9. Status post IV Injectafer  10. Continue magnesium oxide 400 mg three times a day and weekly IV replacement as needed.   11. Continue Retacrit 40,000 units subcu weekly for hemoglobin less than 10, for chemotherapy-induced anemia  12. Continue supportive medications  13. MRI brain reviewed  14. Reviewed fever precautions   15. RTC in 3 weeks  16. All questions answered     I have reviewed labs results, imaging, vitals, and medications with the patient  today.   Lab Results   Component Value Date    WBC 6.05 03/10/2021    HGB 10.5 (L) 03/10/2021    HCT 34.0 03/10/2021    MCV 94.7 03/10/2021     03/10/2021     Patient verbalized understanding and is in agreement of the above plans.        I spent 40 total minutes, face-to-face, caring for Tahira today.  90% of this time involved counseling and/or coordination of care as documented within this note.

## 2021-03-11 NOTE — PROGRESS NOTES
FOLLOW-UP NOTE    Name: Tahira Zimmerman  YOB: 1955  MRN #: [id number]  Date of Service: 3/11/2021  Primary Care Provider: Noemy Queen MD    DIAGNOSIS: Stage IV ovarian cancer  1. Brain metastases (CMS/HCC)        REASON FOR VISIT: MRI brain f/u    HISTORY OF PRESENT ILLNESS: The patient is a 65 y.o. year old female who completed 27 Gy in 3 fractions, completed 10/28/2020 to both an enlarging left parietal cortex lesion ([id number]) and a left superior cerebellar lesion ([id number]).    She has had a MRI brain on 3/10/21 showing no progression. CT chest abdomen pelvis with progression of disease. She notes that she is doing well but has some dysphagia and and mild headache alleviated with tylenol.     The following portions of the patient's history were reviewed and updated as appropriate: allergies, current medications, past family history, past medical history, past social history, past surgical history and problem list. Reviewed with the patient and remain unchanged.    PAST MEDICAL HISTORY:  she  has a past medical history of Anemia (2013), Cervical disc disorder (2013), Clotting disorder (CMS/HCC) (2013), Colon polyp (2013), Deep vein thrombosis (CMS/HCC) (2013), Diverticulosis (2013), GERD (gastroesophageal reflux disease) (2016), HL (hearing loss) (2016), Hyperlipidemia (2013), Hypertension, Joint pain (2013), Low back pain (2013), Neuromuscular disorder (CMS/HCC) (2015), Osteopenia (2014), Osteoporosis, Ovarian cancer (CMS/HCC), Ovarian cancer on left (CMS/HCC) (1995), Pneumonia (2010), Spinal stenosis (2013), and Urinary tract infection (2013).  MEDICATIONS:   Current Outpatient Medications:   •  acetaminophen (TYLENOL) 500 MG tablet, Take 1,000 mg by mouth Every 6 (Six) Hours As Needed for Mild Pain ., Disp: , Rfl:   •  albuterol sulfate  (90 Base) MCG/ACT inhaler, Inhale 2 puffs Every 4 (Four) Hours As Needed for Wheezing., Disp: 18 g, Rfl: 0  •  atorvastatin (LIPITOR) 20 MG  tablet, Take 20 mg by mouth every night at bedtime., Disp: , Rfl: 3  •  azelastine (ASTELIN) 0.1 % nasal spray, 2 sprays into the nostril(s) as directed by provider Daily. Use in each nostril as directed, Disp: 30 mL, Rfl: 0  •  benzonatate (Tessalon Perles) 100 MG capsule, Take 1 capsule by mouth 3 (Three) Times a Day As Needed for Cough., Disp: 30 capsule, Rfl: 0  •  calcium carbonate (Calcium 600) 600 MG tablet, Take 1 tablet by mouth 2 (two) times a day., Disp: 60 tablet, Rfl: 11  •  cetirizine (zyrTEC) 10 MG tablet, Take 1 tablet by mouth Every Night., Disp: 30 tablet, Rfl: 0  •  cyanocobalamin 1000 MCG/ML injection, Inject 1,000 mcg into the appropriate muscle as directed by prescriber Every 28 (Twenty-Eight) Days., Disp: , Rfl:   •  famotidine (PEPCID) 40 MG tablet, TAKE 1 TABLET BY MOUTH EVERY MORNING BEFORE BREAKFAST, Disp: 90 tablet, Rfl: 1  •  magnesium oxide (MAG-OX) 400 MG tablet, TAKE 1 TABLET BY MOUTH TWICE A DAY, Disp: 180 tablet, Rfl: 1  •  montelukast (Singulair) 10 MG tablet, Take 1 tablet by mouth Every Night., Disp: 30 tablet, Rfl: 0  •  oxyCODONE (ROXICODONE) 10 MG tablet, Take 1 tablet by mouth Every 6 (Six) Hours As Needed for Moderate Pain ., Disp: 120 tablet, Rfl: 0  •  potassium chloride (K-DUR,KLOR-CON) 20 MEQ CR tablet, Take 40 mEq by mouth Every Night., Disp: , Rfl:   •  pregabalin (LYRICA) 100 MG capsule, Take 1 capsule by mouth 3 (Three) Times a Day., Disp: 90 capsule, Rfl: 2  •  sertraline (ZOLOFT) 50 MG tablet, Take 50 mg by mouth Every Night., Disp: , Rfl:   •  temazepam (RESTORIL) 30 MG capsule, TAKE 1 CAPSULE BY MOUTH AT NIGHT AS NEEDED FOR SLEEP, Disp: 30 capsule, Rfl: 2  •  triamterene-hydrochlorothiazide (DYAZIDE) 37.5-25 MG per capsule, TAKE 1 CAPSULE BY MOUTH EVERY DAY, Disp: 90 capsule, Rfl: 1  •  warfarin (COUMADIN) 5 MG tablet, Take 1 tablet by mouth Daily. At 1700 as directed, Disp: 30 tablet, Rfl: 0  •  potassium chloride (K-DUR,KLOR-CON) 20 MEQ CR tablet, Take 60 mEq  "by mouth Every Morning., Disp: , Rfl:   No current facility-administered medications for this visit.  ALLERGIES:   Allergies   Allergen Reactions   • Morphine Nausea Only and Hives   • Penicillin V Potassium Rash   • Sulfa Antibiotics Rash   • Levaquin [Levofloxacin] Nausea Only and Dizziness     When taken with Coumadin   • Amoxicillin Rash   • Norco [Hydrocodone-Acetaminophen] Rash     PAST SURGICAL HISTORY: she has a past surgical history that includes Hysterectomy; Hernia repair; Colon surgery; Colonoscopy (05/26/2018); Exploratory laparotomy; Oophorectomy; Cataract extraction; and Bronchoscopy (N/A, 2/10/2021).  PREVIOUS RADIOTHERAPY OR CHEMOTHERAPY: yes  FAMILY HISTORY: her family history includes Cancer in her father; Hypertension in her brother, father, mother, and sister; Stroke in her brother.  SOCIAL HISTORY: she  reports that she has never smoked. She has never used smokeless tobacco. She reports that she does not drink alcohol and does not use drugs.  PAIN AND PAIN MANAGEMENT: no pain at this time. Long discussion of headaches. Different over the interval and no mri findings on right side brain to correspond to symptoms.  Vitals:    03/11/21 0809   BP: 143/82   Pulse: 67   Temp: 97 °F (36.1 °C)   TempSrc: Infrared   SpO2: 95%   Weight: 81.1 kg (178 lb 12.8 oz)   Height: 165.1 cm (65\")   PainSc: 0-No pain     NUTRITIONAL STATUS:   no issues  KPS: 70        Review of Systems:    General: No fevers, chills, weight change, or drenching night sweats. Skin: No rashes or jaundice.  HEENT: No change in vision or hearing,+ headaches.  Neck: No or masses.+dysphagia   Heme/Lymph: No easy bruising or bleeding.  Respiratory System: No shortness of breath or cough.  Cardiovascular: No chest pain, palpitations, or dyspnea on exertion.  - Pacemaker. GI: No nausea, vomiting, diarrhea, melena, or hematochezia.  : No dysuria or hematuria.  Endocrine: No heat or cold intolerance. Musculoskeletal: No myalgias or " "arthralgias.  Neuro: As noted above. No weakness, numbness, syncope, or seizures. Psych: No mood changes or depression. Ext: Denies swelling.        Objective     Vitals:  Vitals:    03/11/21 0809   BP: 143/82   Pulse: 67   Temp: 97 °F (36.1 °C)   TempSrc: Infrared   SpO2: 95%   Weight: 81.1 kg (178 lb 12.8 oz)   Height: 165.1 cm (65\")   PainSc: 0-No pain       PHYSICAL EXAM:  GENERAL: in no apparent distress, sitting comfortably in room.    HEENT: normocephalic, atraumatic. Pupils are equal, round, reactive to light. Sclera anicteric. Conjunctiva not injected. Oropharynx without erythema, ulcerations or thrush.   NECK: Supple with no masses.  CARDIOVASCULAR: S1 & S2 detected; no murmurs, rubs or gallops.  CHEST: clear to auscultation bilaterally; no wheezes, crackles or rubs. Work of breathing normal.  ABDOMEN:  nondistended.   MUSCULOSKELETAL: . Normal range of motion.  EXTREMITIES: no clubbing, cyanosis, edema.  SKIN: no erythema, rashes, ulcerations noted.   NEUROLOGIC: cranial nerves II-XII grossly intact bilaterally. No focal neurologic deficits.  PSYCHIATRIC:  alert, aware, and appropriate.      PERTINENT IMAGING/PATHOLOGY/LABS (Medical Decision Making):     COORDINATION OF CARE: A copy of this note is sent to the referring provider.    PATHOLOGY (Reviewed):  No new path    IMAGING (Reviewed):   MRI BRAIN W WO CONTRAST-     Date of Exam: 3/10/2021 11:00 AM     Indication: brain mets treated [facility] SRs, restaging and evaluation of  equivocal findings; C79.31-Secondary malignant neoplasm of brain.     Technique: Technique: Routine multiplanar multisequence MR imaging of  the brain was performed without and with the administration of 17 ml of  ProHance  gadolinium contrast.        Comparison Exams: 01/26/2021     FINDINGS:  There are no areas restricted diffusion to suggest acute infarct.  There  is no evidence of intracranial hemorrhage.  No abnormal extra-axial  fluid collections are seen.  There is no " hydrocephalus.  The major  intracranial basilar flow voids are preserved.  Sella and supersellar  cistern are within normal limits.  Craniovertebral junction appears  normal.     Postcontrast images demonstrate a rim-enhancing mass within the superior  aspect of the left cerebellar hemisphere measuring 10 x 9 mm in size,  previously 12 x 12 mm in size consistent with metastatic disease.  No  new or enlarging cerebellar mass identified.  Within the left parietal  lobe along the precentral gyrus there is a rim-enhancing mass measuring  10 x 9 mm, 6 previously 11 x 11 mm.  There is a stable 3 mm metastasis  along the precentral virus of the left frontal lobe at the vertex.  No  new or enlarging intracranial metastatic lesions identified.     Globes and orbits are within normal limits.      Visualized paranasal sinuses and mastoid air cells are clear.     IMPRESSION:        1.  There are metastases within the left parietal lobe, left cerebellum,  and left frontal lobe which are stable to slightly decreased in size  from prior study as detailed above.  No new or enlarging intracranial  metastatic lesion identified.  2.  No evidence of infarct or hemorrhage.    LABS (Reviewed):  Hematology WBC   Date Value Ref Range Status   03/10/2021 6.05 3.40 - 10.80 10*3/mm3 Final   07/05/2020 4.04 (L) 4.5 - 11.0 10*3/uL Final     RBC   Date Value Ref Range Status   03/10/2021 3.59 (L) 3.77 - 5.28 10*6/mm3 Final   07/05/2020 3.68 (L) 4.0 - 5.2 10*6/uL Final     Hemoglobin   Date Value Ref Range Status   03/10/2021 10.5 (L) 12.0 - 15.9 g/dL Final   07/05/2020 11.5 (L) 12.0 - 16.0 g/dL Final     Hematocrit   Date Value Ref Range Status   03/10/2021 34.0 34.0 - 46.6 % Final   07/05/2020 35.3 (L) 36.0 - 46.0 % Final     Platelets   Date Value Ref Range Status   03/10/2021 248 140 - 450 10*3/mm3 Final   07/05/2020 158 140 - 440 10*3/uL Final      Chemistry   Lab Results   Component Value Date    GLUCOSE 112 (H) 03/04/2021    BUN 12  03/04/2021    CREATININE 0.82 03/04/2021    EGFRIFNONA 70 03/04/2021    EGFRIFAFRI >60 06/07/2019    BCR 14.6 03/04/2021    K 3.3 (L) 03/04/2021    CO2 27.0 03/04/2021    CALCIUM 8.8 03/04/2021    ALBUMIN 3.70 03/04/2021    LABIL2 1.3 07/03/2020    AST 28 03/04/2021    ALT 9 03/04/2021         Assessment/Plan     ASSESSMENT AND PLAN:  · Recent MRI showing stable findings.   · On systemic therapy  · MRI brain in 3 months with a follow up after.     1. Brain metastases (CMS/HCC)             This assessment comes from my review of the imaging, pathology, physician notes and other pertinent information as mentioned.    DISPOSITION: FU after MRI brain as noted above.      CC: MD Fletcher Espinoza MD  3/11/2021  9:34 AM EST

## 2021-03-12 NOTE — PROGRESS NOTES
Pt to the clinic for C1D1 of Gemzar and Cisplatin.  Pt states that she feels good and has no complaints at this time. Port accessed and flushed with good blood return noted. 10cc of blood waster prior to specimen collection. Blood specimen obtained and sent to lab for processing per protocol. Pt tolerated treatment well.  Port flushed with saline and heparin prior to needle removal.

## 2021-03-15 NOTE — TELEPHONE ENCOUNTER
Called pt to let her know that I will call in a prescription for tessalon pearls. She is currently taking 100 mg TID and Dr. Ball increased it to 200 mg. I told pt to let us know if it does not help. She verbalized understanding. Prescription called into [facility].

## 2021-03-18 NOTE — TELEPHONE ENCOUNTER
From: Tahira Zimmerman  Sent: 3/18/2021 10:00 AM EDT  To: [facility]  Subject: RE: RE: Non-Urgent Medical Question    I didn’t hear anything yesterday. Is he in the office today?

## 2021-03-18 NOTE — TELEPHONE ENCOUNTER
Attempted to call pt to let her know that, per Dr. Ball, she should go to the ED to be evaluated for difficulty breathing. No answer or identifying VM. Callback number left. Message sent to pt's [email or web address] as well.

## 2021-03-18 NOTE — ED PROVIDER NOTES
Subjective   History of Present Illness  Context: 65-year-old female presents with cough she states she has had this for a couple of weeks.  She complains of sharp chest pain today worse with cough.  She has had no shortness of breath except when she is coughing.  She reports no fever she states she brings up some occasional clear sputum.  She has had no diarrhea no loss of taste or smell.  She states that her INR was elevated and had her Coumadin dose decreased last night.  She has had no bleeding.  She has history of metastatic ovarian cancer.  Location: Chest  Quality: Sharp  Duration: Today  Timing: Constant  Severity: Moderate   Modifying factors: Worse with cough  Associated signs and symptoms: Cough, shortness of breath with cough.    Review of Systems  Negative for fever diarrhea positive for some vomiting with cough negative loss of taste or smell.  Past Medical History:   Diagnosis Date   • Anemia 2013   • Cervical disc disorder 2013    Herniated disc, pinched nerve   • Clotting disorder (CMS/HCC) 2013    Low platelets from chemo   • Colon polyp 2013   • Deep vein thrombosis (CMS/[facility]) 2013   • Diverticulosis 2013   • GERD (gastroesophageal reflux disease) 2016   • HL (hearing loss) 2016    I need hearing aids   • Hyperlipidemia 2013    Need my cholesterol rechecked   • Hypertension    • Joint pain 2013    Shoulder pain, torn rotator cuff   • Low back pain 2013   • Neuromuscular disorder (CMS/[facility]) 2015    Shingles, pinched nerves in my neck   • Osteopenia 2014   • Osteoporosis    • Ovarian cancer (CMS/[facility])     ovarian   • Ovarian cancer on left (CMS/[facility]) 1995   • Pneumonia 2010    Have had it several times   • Spinal stenosis 2013    Cervical   • Urinary tract infection 2013    Have had several utis       Allergies   Allergen Reactions   • Morphine Nausea Only and Hives   • Penicillin V Potassium Rash   • Sulfa Antibiotics Rash   • Levaquin [Levofloxacin] Nausea Only and Dizziness     When taken with  Coumadin   • Amoxicillin Rash   • Norco [Hydrocodone-Acetaminophen] Rash       Past Surgical History:   Procedure Laterality Date   • BRONCHOSCOPY N/A 2/10/2021    Procedure: BRONCHOSCOPY with bronchioalveolar lavage;  Surgeon: Jerica Esparza MD;  Location: [facility] ENDOSCOPY;  Service: Pulmonary;  Laterality: N/A;  post op:right lobe pneumonia   • CATARACT EXTRACTION     • COLON SURGERY     • COLONOSCOPY  05/26/2018   • EXPLORATORY LAPAROTOMY     • HERNIA REPAIR     • HYSTERECTOMY     • OOPHORECTOMY         Family History   Problem Relation Age of Onset   • Hypertension Mother    • Cancer Father    • Hypertension Father    • Hypertension Sister    • Hypertension Brother    • Stroke Brother        Social History     Socioeconomic History   • Marital status:      Spouse name: Not on file   • Number of children: Not on file   • Years of education: Not on file   • Highest education level: Not on file   Tobacco Use   • Smoking status: Never Smoker   • Smokeless tobacco: Never Used   Vaping Use   • Vaping Use: Never used   Substance and Sexual Activity   • Alcohol use: No     Comment: caffeine 32-64oz qd   • Drug use: No   • Sexual activity: Not Currently     Partners: Male     Birth control/protection: Surgical     Prior to Admission medications    Medication Sig Start Date End Date Taking? Authorizing Provider   acetaminophen (TYLENOL) 500 MG tablet Take 1,000 mg by mouth Every 6 (Six) Hours As Needed for Mild Pain .    Provider, MD Ella   albuterol sulfate  (90 Base) MCG/ACT inhaler Inhale 2 puffs Every 4 (Four) Hours As Needed for Wheezing. 1/13/21   Noemy Queen MD   atorvastatin (LIPITOR) 20 MG tablet Take 20 mg by mouth every night at bedtime. 5/11/19   ProviderElla MD   azelastine (ASTELIN) 0.1 % nasal spray 2 sprays into the nostril(s) as directed by provider Daily. Use in each nostril as directed 2/13/21   Amberly Gonzalez MD   benzonatate (Tessalon Perles) 100 MG capsule Take  1 capsule by mouth 3 (Three) Times a Day As Needed for Cough. 2/12/21   Amberly Gonzalez MD   calcium carbonate (Calcium 600) 600 MG tablet Take 1 tablet by mouth 2 (two) times a day. 2/1/21   Cindy Ball MD   cetirizine (zyrTEC) 10 MG tablet Take 1 tablet by mouth Every Night. 2/12/21   Amberly Gonzalez MD   cyanocobalamin 1000 MCG/ML injection Inject 1,000 mcg into the appropriate muscle as directed by prescriber Every 28 (Twenty-Eight) Days.    Ella Andres MD   dexamethasone (DECADRON) 4 MG tablet Take 2 tablets in the morning daily on days 2, 3 & 4.  Take with food. 3/12/21   Cindy Ball MD   famotidine (PEPCID) 40 MG tablet TAKE 1 TABLET BY MOUTH EVERY MORNING BEFORE BREAKFAST 3/3/20   Mira Jeffrey APRN   guaiFENesin-codeine (GUAIFENESIN AC) 100-10 MG/5ML liquid Take 5 mL by mouth At Night As Needed for Cough. 3/15/21   Cindy Ball MD   magnesium oxide (MAG-OX) 400 MG tablet TAKE 1 TABLET BY MOUTH TWICE A DAY 6/1/20   Daryn Tay MD   montelukast (Singulair) 10 MG tablet Take 1 tablet by mouth Every Night. 2/22/21   Noemy Queen MD   ondansetron (ZOFRAN) 8 MG tablet Take 1 tablet by mouth 3 (Three) Times a Day As Needed for Nausea or Vomiting. 3/12/21   Cindy Ball MD   oxyCODONE (ROXICODONE) 10 MG tablet Take 1 tablet by mouth Every 4 (Four) Hours As Needed for Moderate Pain . 3/18/21   Fito Ram MD   potassium chloride (K-DUR,KLOR-CON) 20 MEQ CR tablet Take 60 mEq by mouth Every Morning.    Ella Andres MD   potassium chloride (K-DUR,KLOR-CON) 20 MEQ CR tablet Take 40 mEq by mouth Every Night.    Ella Andres MD   pregabalin (LYRICA) 100 MG capsule Take 1 capsule by mouth 3 (Three) Times a Day. 1/12/21   Fito Ram MD   sertraline (ZOLOFT) 50 MG tablet Take 50 mg by mouth Every Night.    Provider, MD Ella   temazepam (RESTORIL) 30 MG capsule TAKE 1 CAPSULE BY MOUTH AT NIGHT AS NEEDED  FOR SLEEP 3/5/21   Noemy Queen MD   triamterene-hydrochlorothiazide (DYAZIDE) 37.5-25 MG per capsule TAKE 1 CAPSULE BY MOUTH EVERY DAY 12/2/20   Noemy Queen MD   warfarin (COUMADIN) 5 MG tablet Take 1 tablet by mouth Daily. At 1700 as directed 2/16/21   Cindy Ball MD   oxyCODONE (ROXICODONE) 10 MG tablet Take 1 tablet by mouth Every 6 (Six) Hours As Needed for Moderate Pain . 2/22/21 3/18/21  Fito Ram MD           Objective   Physical Exam  65-year-old female awake alert.  Generally well-developed well-nourished.  Pupils equal round at light.  Neck supple.  Chest clear equal breath sounds.  Cardiovascular regular in rhythm.  She does not complain of chest wall tender with palpation but describes the pain in the parasternal area worse with deep breath or cough.  Abdomen soft nontender.  Extremities without tenderness edema.  Skin without rash noted.  Procedures           ED Course      Results for orders placed or performed during the hospital encounter of 03/18/21   Respiratory Panel PCR w/COVID-19(SARS-CoV-2) JAMAL/ROLF/ABENA/PAD/COR/MAD/MICHAEL In-House, NP Swab in UTM/VTM, 3-4 HR TAT - Swab, Nasopharynx    Specimen: Nasopharynx; Swab   Result Value Ref Range    ADENOVIRUS, PCR Not Detected Not Detected    Coronavirus 229E Not Detected Not Detected    Coronavirus HKU1 Not Detected Not Detected    Coronavirus NL63 Not Detected Not Detected    Coronavirus OC43 Not Detected Not Detected    COVID19 Not Detected Not Detected - Ref. Range    Human Metapneumovirus Not Detected Not Detected    Human Rhinovirus/Enterovirus Not Detected Not Detected    Influenza A PCR Not Detected Not Detected    Influenza B PCR Not Detected Not Detected    Parainfluenza Virus 1 Not Detected Not Detected    Parainfluenza Virus 2 Not Detected Not Detected    Parainfluenza Virus 3 Not Detected Not Detected    Parainfluenza Virus 4 Not Detected Not Detected    RSV, PCR Not Detected Not Detected    Bordetella  pertussis pcr Not Detected Not Detected    Bordetella parapertussis PCR Not Detected Not Detected    Chlamydophila pneumoniae PCR Not Detected Not Detected    Mycoplasma pneumo by PCR Not Detected Not Detected   Protime-INR    Specimen: Blood   Result Value Ref Range    Protime 20.8 19.4 - 28.5 Seconds    INR 1.95 (L) 2.00 - 3.00   Basic Metabolic Panel    Specimen: Blood   Result Value Ref Range    Glucose 103 (H) 65 - 99 mg/dL    BUN 18 8 - 23 mg/dL    Creatinine 0.89 0.57 - 1.00 mg/dL    Sodium 138 136 - 145 mmol/L    Potassium 4.8 3.5 - 5.2 mmol/L    Chloride 100 98 - 107 mmol/L    CO2 27.0 22.0 - 29.0 mmol/L    Calcium 9.0 8.6 - 10.5 mg/dL    eGFR Non African Amer 64 >60 mL/min/1.73    BUN/Creatinine Ratio 20.2 7.0 - 25.0    Anion Gap 11.0 5.0 - 15.0 mmol/L   Troponin    Specimen: Blood   Result Value Ref Range    Troponin T <0.010 0.000 - 0.030 ng/mL   CBC Auto Differential    Specimen: Blood   Result Value Ref Range    WBC 4.20 3.40 - 10.80 10*3/mm3    RBC 3.29 (L) 3.77 - 5.28 10*6/mm3    Hemoglobin 9.7 (L) 12.0 - 15.9 g/dL    Hematocrit 29.6 (L) 34.0 - 46.6 %    MCV 90.0 79.0 - 97.0 fL    MCH 29.5 26.6 - 33.0 pg    MCHC 32.8 31.5 - 35.7 g/dL    RDW 19.2 (H) 12.3 - 15.4 %    RDW-SD 60.4 (H) 37.0 - 54.0 fl    MPV 8.4 6.0 - 12.0 fL    Platelets 143 140 - 450 10*3/mm3    Neutrophil % 61.2 42.7 - 76.0 %    Lymphocyte % 28.5 19.6 - 45.3 %    Monocyte % 8.7 5.0 - 12.0 %    Eosinophil % 0.8 0.3 - 6.2 %    Basophil % 0.8 0.0 - 1.5 %    Neutrophils, Absolute 2.60 1.70 - 7.00 10*3/mm3    Lymphocytes, Absolute 1.20 0.70 - 3.10 10*3/mm3    Monocytes, Absolute 0.40 0.10 - 0.90 10*3/mm3    Eosinophils, Absolute 0.00 0.00 - 0.40 10*3/mm3    Basophils, Absolute 0.00 0.00 - 0.20 10*3/mm3    nRBC 0.0 0.0 - 0.2 /100 WBC   ECG 12 Lead   Result Value Ref Range    QT Interval 412 ms   Gold Top - SST   Result Value Ref Range    Extra Tube Hold for add-ons.      XR Chest 2 View    Result Date: 3/18/2021   1. Ill-defined interstitial  "and alveolar disease changes scattered in both lungs may reflect changes of atypical pneumonia and/or pulmonary fibrosis. 2. Mediastinal and hilar adenopathy described on recent CT chest is not well visualized on today's plain film examination.  Electronically Signed By-Cecilia Velasco MD On:3/18/2021 1:42 PM This report was finalized on [id number] by  Cecilia Velasco MD.    Medications - No data to display  /79   Pulse 71   Temp 98.1 °F (36.7 °C)   Resp 16   Ht 165.1 cm (65\")   Wt 81.6 kg (180 lb)   LMP  (LMP Unknown)   SpO2 100%   BMI 29.95 kg/m²                                        MDM  Chart review: Patient has had outpatient visits for malignant neoplasm of right ovary with oncology infusion on the 12th.  Also had labs yesterday for chronic DVT left upper extremity with INR 3.5  Comorbidity: As per past history  Differential: Covid, viral illness, pneumonia, DVT felt to be unlikely since she is on chronic anticoagulation and was mildly supratherapeutic just yesterday.  MI felt to be unlikely based on symptoms.  My EKG interpretation: Sinus rhythm.  There is some baseline artifact.  Compared to previous no significant change  Lab: Respiratory virus panel including Covid negative basic metabolic panel essentially normal glucose 103 INR 1.95 troponin negative White count 4.2 with hemoglobin 9.7 platelet count of 143 with normal differential  Radiology: I reviewed chest x-ray.  There is some ill-defined interstitial and alveolar disease scattered in both lungs that may be atypical pneumonia or pulmonary fibrosis.  The mediastinal and hilar adenopathy noted on recent CT scan is not well visualized.  Discussion/treatment: Patient's findings were discussed with her.  She was discharged.  She was advised to use Tylenol for pain.  Advised to use Mucinex DM for cough.  She was also placed on Zithromax.  Advised to follow-up with her oncologist or primary physician return new or worsening " symptoms  Patient was evaluated using appropriate PPE      Final diagnoses:   Chest wall pain   Bronchitis       ED Disposition  ED Disposition     ED Disposition Condition Comment    Discharge Stable           Noemy Queen MD  [address]  [address]  [place] [address]  [phone]    Schedule an appointment as soon as possible for a visit       Cindy Ball MD  [address]  [address]  [place] [address] [phone]    Schedule an appointment as soon as possible for a visit            Medication List      New Prescriptions    azithromycin 250 MG tablet  Commonly known as: Zithromax Z-Victoriano  Take 2 tablets the first day, then 1 tablet daily for 4 days.        Changed    oxyCODONE 10 MG tablet  Commonly known as: ROXICODONE  Take 1 tablet by mouth Every 4 (Four) Hours As Needed for Moderate Pain .  What changed: when to take this           Where to Get Your Medications      These medications were sent to [id number] - [facility] [place] - [address] AT SEC OF [place] [address] & [address] - [phone]  - [phone]   [address] [facility] [place] [address]    Phone: [phone]   · azithromycin 250 MG tablet  · oxyCODONE 10 MG tablet          Sebastián Early MD  03/18/21 0306

## 2021-03-18 NOTE — DISCHARGE INSTRUCTIONS
May use Tylenol for pain, may use Mucinex DM for cough, follow-up PMD or oncologist, return new or worsening symptoms, recheck your INR in 3 to 5 days as antibiotic could potentially cause it to increase.  It was 1.95 today

## 2021-03-19 NOTE — TELEPHONE ENCOUNTER
Attempted to call pt to verify how much potassium she is taking. No answer or identifying VM. Callback number left.

## 2021-03-23 PROBLEM — R05.9 COUGH: Status: ACTIVE | Noted: 2021-01-01

## 2021-04-09 NOTE — PROGRESS NOTES
"Subjective   Tahira Zimmerman is a 65 y.o. female.     neck and shoulder pain, all over aching \"bone\" due to chemo--also left rotator cuff tear also broke out with shingles-under breast on back and on head. 9/10 at worst, 2/10 at best. Nonradiating. Always present, varies, interferes with daily activities. Imaging shows metastatic disease. Lengthy prior notes reviwed, treated for metastatic disease, failed Tramadol, cannot tolerate Hydrocodone, could tolerate Oxycodone 5mg, taking BID, also Lyrica 75mg BID. Started new chemo, worsening pain in b/l hips and legs, especially at night. Now taking Oxycodone 10mg TID prn and Lyrica 100mg BID with good relief. Started another new chemo with worsening pain, on a break while insurance approves pill form with improved pain. New chemo pill lowered Mg, holding it while getting Mg infusions. Shingles outbreak. Started another new chemo, has aches controlled by current meds. Had fall, 2 \"somethings\" found on her brain on imaging. Holding chemo with PNA, on ABX.       The following portions of the patient's history were reviewed and updated as appropriate: allergies, current medications, past family history, past medical history, past social history, past surgical history and problem list.    Review of Systems   Constitutional: Negative for chills, fatigue and fever.   HENT: Positive for hearing loss. Negative for trouble swallowing.    Eyes: Negative for visual disturbance.   Respiratory: Negative for shortness of breath.    Cardiovascular: Negative for chest pain.   Gastrointestinal: Positive for abdominal pain and nausea. Negative for constipation, diarrhea and vomiting.   Genitourinary: Negative for urinary incontinence.   Musculoskeletal: Positive for arthralgias, back pain and neck pain. Negative for joint swelling and myalgias.   Neurological: Positive for headache. Negative for dizziness, weakness and numbness.       Objective   Physical Exam   Constitutional: She is " oriented to person, place, and time. She appears well-developed and well-nourished.   HENT:   Head: Normocephalic and atraumatic.   Eyes: Pupils are equal, round, and reactive to light.   Cardiovascular: Normal rate, regular rhythm and normal heart sounds.   Pulmonary/Chest: Breath sounds normal.   Abdominal: Soft. Bowel sounds are normal. She exhibits no distension. There is no abdominal tenderness.   Neurological: She is alert and oriented to person, place, and time. She has normal reflexes. She displays normal reflexes. A sensory deficit is present.   Decreased in b/l stocking distribution   Psychiatric: Her behavior is normal. Thought content normal.         Assessment/Plan   Diagnoses and all orders for this visit:    1. Pain of metastatic malignancy (Primary)    2. Cervical disc disorder with radiculopathy    3. Leg pain, bilateral    4. Neck pain    5. Other long term (current) drug therapy    6. Restless leg syndrome        [facility] reviewed, in order, filled 12/31/20. Low risk. Repeat drug screen 1/6/20 in order.  Increased to Oxycodone 10mg 1/2 - 1 tab q4h prn, cannot tolerate Hydrocodone, failed Tramadol. May need to increase in future, but doing well for now.  Increased to Lyrica 100mg TID for worsening pain. More effective than Gabapentin, already taking antidepressants so no plans for beginning Cymbalta.  Cont RxAlt shingles cream prn.  Cont other meds as prescribed.  No plans for procedures or TENS with metastatic disease.  RTC 3 months for f/u.

## 2021-04-15 NOTE — TELEPHONE ENCOUNTER
From: Tahira Zimmerman  To: Fito Ram MD  Sent: 4/15/2021 11:04 AM EDT  Subject: Prescription Question    I won’t have a ride until next week. Would you allow me to get my pain medicine filled today please.

## 2021-04-19 NOTE — TELEPHONE ENCOUNTER
L/M need to reschedule Tahira's  follow up appt with Dr Delong due to he will be out of the office

## 2021-04-27 NOTE — TELEPHONE ENCOUNTER
Called pt to let her know that her phosphorus was low and that Dr. Ball has ordered Neutra Phos. She verbalized understanding. Prescription called into pharmacy.

## 2021-04-27 NOTE — TELEPHONE ENCOUNTER
----- Message from Cindy Ball MD sent at 4/23/2021  2:35 PM EDT -----  Neutra phos 1 packet po q 12 for 1 day.

## 2021-04-30 NOTE — PROGRESS NOTES
Hematology/Oncology Outpatient Follow Up    PATIENT NAME:Tahira Zimmerman  :1955  MRN: [id number]  PRIMARY CARE PHYSICIAN: Noemy Queen MD  REFERRING PHYSICIAN: Noemy Queen MD    Chief Complaint   Patient presents with   • Follow-up   • Chemotherapy      HISTORY OF PRESENT ILLNESS:     1. Recurrent ovarian cancer diagnosis established in 2013.  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  The patient was treated with surgery and then postoperatively with adjuvant chemotherapy consisting of carboplatin and Taxol.  Six months later, she had recurrence of disease intra-abdominally between the rectum and vagina, which was treated with intraabdominal peritoneal chemotherapy.  She had been free of disease since that time.  She reported feeling a mass in the left side of her abdomen approximately six months ago.  This gradually grew and in early 2013 she had her daughter feel the mass.  The daughter works for Dr. David Rogers and the patient at that time also complained of intermittent rectal bleeding.  Consultation with Dr. David Rogres was obtained.  The patient had a CT scan of the abdomen and pelvis performed on 13 revealing left lower quadrant mass measuring 9.7 x 9.3 x 6 cm demonstrating areas of dense calcification, soft tissue density and cystic density within it.  Stromal tumor is felt to be most likely a possibly fibroma.  It is generated with necrosis and calcification.  Possible palpable mass in the rectus muscle is seen with calcification and deformity of the rectus muscle and just below this a second mass intra-abdominally was seen measuring 2 cm in the greatest diameter suspicious for second intraabdominal tumor.  The mass separate from the largest mass measuring 2.6 cm in the dependent portion of pelvis is seen on the right of the midline.  No retroperitoneal  lymphadenopathy was seen.  No free air, free fluid or other abnormality was present.  The patient was scheduled for an outpatient colonoscopy, but had syncopal episode while sitting in the toilet the night before and was brought to the emergency room early in the morning by her daughter and son-in-law.  The patient had apparently stopped breathing for a few seconds and did not have a pulse, but started breathing before CPR could be initiated.  In the emergency room, the patient had an EKG revealing sinus rhythm nonspecific T-wave flattening; metabolic panel revealed a glucose of 148, creatinine of 1.3, CBC was normal.  She was treated with intravenous fluid.  The patient’s case was then discussed with Dr. Anabell Monique and patient was subsequently admitted to [facility].  The patient underwent a colonoscopy by Dr. David Rogers on 06/27/13 revealing sigmoid diverticulosis and grade II internal and external hemorrhoids, but no mass or colitis was seen.  CT-guided biopsy of the mass was performed on 06/27/13 as well revealing metastatic papillary adenocarcinoma with numerous psammoma bodies.  Pathology comment stated prior records were not available; however, with history of ovarian cancer the current biopsy would be consistent with metastases from that malignancy.  Oncology consult was obtained and I initially saw the patient on 06/27/2013 where the patient had claimed to have little bit of pain in the area of disease.  She reported being frequently constipated, denies any weight loss or fevers.  She did report having some night sweats, but thought that was because of her menopause.  On 06/27/13 metastatic workup including labs and CT scans were initiated.  CT scan of the chest on 06/27/13 revealed no acute disease in the chest.  A 1.4 cm size focal area of decreased density was noted in the lateral aspect of the right lobe of the liver consistent with a benign cyst or hemangioma.  There was a very  small hiatal hernia measuring 2.2 cm in diameter.  A CT scan of the head performed 06/27/13 revealed no acute intracranial abnormality noted.  CA-125 was 40 units/ml (0-35), CA 19-9 was 11.8 units/ml (0-35), CEA level was 0.8 ng/ml (0-3), TSH level was 1.09 IU/ml (0.34-5.6).  The patient was in workup for the syncopal episode, a carotid Doppler was performed on June 28 revealing an essentially normal study showing a reduction in diameter from 0-15% bilaterally.  A Holter monitor was completed on 06/27/13, which was unremarkable except for a few premature atrial contractions.  The patient was felt stable and subsequently was discharged home on 06/28/13.  Post discharge, the patient was seen at [facility] on 07/05/13 by Dr. Kathryn Thrasher who discussed treatment options with the patient including surgery.  The patient is in the office today 07/16/13 in follow up post hospitalization.  She is reporting that surgery is planned for July 30th of this month at .  · 7/30/13 to 8/3/13 - The patient was in [facility].  The patient was admitted for surgery for her recurrent ovarian cancer.  The patient had exploratory laparotomy, omentectomy, bowel resection, liver biopsy and appendectomy.  Pathology revealed of the omentum metastatic serous carcinoma of the transverse colon, segmental resection showed metastatic serous carcinoma involving mesenteric fat.  The liver wedge resection showed fibrosclerotic thickening of the hepatic capsule.  Benign liver parenchyma.  No evidence of metastatic tumor.  Appendix showed fibrous obliteration of the lumen.  Negative for metastatic carcinoma.  Rectum and sigmoid colon segmental resection showed metastatic serous carcinoma invading the colorectal mucosa uninvolved by tumor.  Vaginal mass showed metastatic serous carcinoma.  Margins are positive for tumor.  · 9/24/13 - Chemotherapy orders were written for patient to start carboplatin 550  mg IV day one and Taxol 330 mg IV day one to be cycled every three weeks.  · 10/10/13 - The patient started cycle #1 of chemotherapy consisting of Carboplatin and Taxol.  · 09/25/13 -  is 10.6 normal.  · 10/01/13 - CT of the chest, abdomen and pelvis revealed new reticular nodular occupancy in the anterior segment of the right upper lobe, could reflect an inflammation or infectious etiology or could reflect unusual persistence of metastatic tumor.  New liver lesions, the smaller one appears to be implant on the surface of the liver, the larger may also represent an implant including the intrahepatic.  Several small mesenteric nodes seen throughout the abdomen and pelvis.  · 10/02/13 - Port placed by Dr. Sen.  · 10/24/13 - Orders written to start Neupogen daily and if more than three Neupogen are needed to give Neulasta after next chemo.  ANC is 0.15.  · 10/31/14 - Patient given cycle 2 of chemotherapy with Taxol and Carboplatin.  · 11/21/13 - The patient started cycle 3 of chemotherapy consisting of Carboplatin and Taxol.  · 11/26/13 - CT scan of chest, abdomen and pelvis revealed no evidence of active disease in the chest.  Reticulonodular opacity has resolved.  Metastatic lesion impressing upon the hepatic dome similar to prior exam.  No evidence of a change.  No evidence of new disease.  Postsurgical changes in the pelvis and throughout the retroperitoneum in the large bowel.  Finding containing anterior abdominal wall hernia containing fat tissue and enhancing vessels, 3 cm in diameter.  · 12/2/13 - Orders written to start Neupogen per protocol as well as Aranesp due to low hemoglobin and ANC.  ANC is 0.14.  Hemoglobin is 9.7.  · 12/11/13 - Orders written to hold chemotherapy until next week and decrease dose by 20% due to low platelet count.  Platelet count is 85,000.  · 12/16/13 - The patient received cycle 4 of Carboplatin and Taxol at a 20% dose reduction so Taxol dose is 260 mg and Carboplatin is 440  mg.  · 12/16/13 - CA-125 12.6 (N).  · 12/19/13 - PET/CT scan.  Impression:  1. There is no evidence of hypermetabolism in the liver.  Specifically of the dominant lesion in or adjacent to the right hepatic dome that was seen and described on the recent CT study of 11/26/2013.  It is photopenic relative to the surrounding liver.  2.  There is no evidence of hypermetabolic soft issue mass lesion or free fluid in the abdomen or pelvis.  3.  The tonsillar tissues are slightly enlarged and have moderate activity level.  This maybe physiologic.  Correlation with oral cavity, examination is recommended.  4.  Mild amount of activity in the right level II jugular lymph chain without focally enlarged lymph nodes is of questionable significance.  · 1/6/13 - The patient received cycle 5 of chemotherapy with Taxol and Carboplatin.  · 1/27/14 - The patient started on cycle 6 of Taxol and Carboplatin.  · 2/18/14 - CT of the abdomen and pelvis with contrast; stable lesions impressing upon the hepatic dome, unchanged compared to the prior exam.  Multiple anterior abdominal wall hernias re-demonstrated.  Those above the umbilicus contain omental fat.  Below and to the left of the umbilicus as anterior abdominal hernia containing omental fat in nondilated small bowel which is larger than seen previously.  · 2/20/14 - The patient received cycle 7 of chemotherapy with Taxol and Carboplatin.   · 3/11/14 - Order written to discontinue chemotherapy due to patient has completed 7 cycles of chemotherapy and CT scans suggest possible remission.  · 3/11/14 -  11.0 (N).  · 06/05/14 - CT scan of the chest abdomen and pelvis with contrast: There are multiple anterior abdominal wall hernias.  There are 2 larger anterior abdominal wall hernias at the level of the transverse colon, which contain omental fat.  There are at least 2 small anterior abdominal wall hernias that contain omental fat.  There is a moderate sized anterior abdominal wall  hernia at the level of the umbilicus, eccentric to the left, which contain non-dilated bowel.  Interval decrease in the size of two deposit impressing on the liver.  The third tiny deposit has been stable.  · 8/19/14 -  10.4 (N).  · 12/19/14 -   10.0 (N)  · 1/7/15 - CT chest, abdomen and pelvis with stable appearance of the chest with several micronodules. Small hiatal hernia, slightly larger than previous exam, increased from 1.5 to 2.5 cm in diameter. Bochdalek hernia unchanged. Multiple hepatic lesions. The two dominant lesions at the right hepatic dome had decreased in size from previous. The remaining tiny nodules measured 3-5 mm in diameter and were unchanged. There was no evidence of new intra-abdominal or pelvic mass or free fluid. There were multiple anterior abdominal wall hernias which had increased in size.   · 7/27/15 - Comprehensive metabolic panel with no significant abnormalities. CA-125 of 21 (0-35).   · 10/26/15 - WBC 6, hemoglobin 13, platelet count 204,000. Heart rate 47. CA-125 of 20 (0-35).    · 2/18/16 - CT chest with contrast with stable micronodular density seen in the lungs without significant change from prior exam. CT abdomen and pelvis with regression of liver lesions with no new evidence of metastasis. Multiple ventral hernias again noted without significant change from prior exam. Creatinine 0.9 (0.6-1.3).    · 2/25/16 - Patient hospitalized at [facility] between 2/25/16 and 2/27/16 with pyelonephritis secondary to E-coli. She was given two days of IV Rocephin and then placed on oral Levaquin. She was asked to hold her Coumadin, but then had nausea and vomiting because of Levaquin and stopped the Levaquin also. Her CA-125 was 32 (0-35) and CEA 1.3 (0-5). Her urine grew >100,000 E-coli. CT of the abdomen and pelvis was done which revealed 4.1 x 1.8 cm sized mild ovoid area of decreased density in the liver parenchyma along the anteromedial aspect of the dome of  the right lobe of the liver, unchanged significantly since the previous study of 2/18/16. There was suspicion of a very small pericardial effusion. There was suspicion of a small hiatal hernia. There were several hernias in the anterior abdominal wall. A 3.5 x 3.1 cm incised well-circumscribed abnormal density in the left retroperitoneal fatty soft tissue at the level of the left iliac crest was suggestive of tumor recurrence. There was an abnormal density in the left retroperitoneal soft tissues in the left flank that partially surrounded the left kidney and extended downward to the left pelvic area. Diverticulosis of the distal descending colon and sigmoid colon were seen.     · 3/8/16 - Patient prescribed Bactrim DS 1 p.o. b.i.d. for 10 days for pyelonephritis, which was partially treated with Rocephin and Levaquin. Urine with 40,000 E-coli treated with Macrobid for 7 days.  of 20 (0-35).    · 3/31/16 - PET scan with area of low density anteriorly in the right lobe of the liver with no significant radiopharmaceutical uptake to suggest malignancy. Multiple round soft tissue density masses showing increased radiopharmaceutical activity and multiple anterior abdominal wall hernias.   · 5/10/16 - Patient complains of venous enlargement of the left chest wall around the port. Has not been seen by  yet, but has an appointment on 5/23/16. Complained of a cough.   · 5/12/16 - Infusaport contrast study with no evidence of fibrin sheath. Chest x-ray with mild cardiac prominence.   · 5/23/16 - Patient seen in consultation by Sheng Bowman M.D. at [facility] and a chemotherapy trial recommended.   · 6/1/16 -   of 27.1 (0-35).    · 6/14/16 - WBC 5.71, hemoglobin 12.4, platelet count 188,000. Patient is scheduled for surgery at  on 6/16/16 after further discussion with  physicians. Discussed adjuvant chemotherapy.   · 6/16/16 - Excisional biopsy of abdominal wall mass performed by Sheng Bowman  M.D. at [facility] with pathology revealing metastatic high-grade papillary serous carcinoma.   · 7/7/16 - Received a call from the patient that Tramadol was not working and that she was given Norco #24 after her biopsy at  which did help her pain. Patient prescribed Norco 5/325 one p.o. q. 4-6 hours p.r.n. #60 with no refills. Echocardiogram with left ventricular inferior wall hypokinesis with ejection fraction of 50-55%.   · 7/8/16 - Patient seen in consultation by Sheng Bowman M.D. in consultation at  for metastatic high-grade papillary serous carcinoma diagnosed by excisional biopsy on 6/16/16 with carcinomatosis and patient not a surgical resection candidate. Genetic sequencing and susceptibility testing of tumor was ordered. Biopsy was done of anterior abdominal wall.   · 7/7/16 - Echocardiogram with left atrial enlargement. Mild mitral regurgitation. Left ventricular proximal inferior wall hypokinesis with ejection fraction of 50-55%.   · 7/11/16 - While waiting for genomics results, in order not to delay treatment further, order written for Taxotere 60 mg/M2 IV over one hour followed by Carboplatin AUC 5 over one hour, cycles to be repeated every three weeks.  Comprehensive metabolic panel normal.  26 (0-35).    · 725/16 - Pain contract signed for use of Norco.   · 8/5/16 - Patient stated that she experienced a red rash and was itchy post taking Norco. Norco discontinued and Tramadol refilled.   · 8/16/16 - Patient started cycle 1 chemotherapy. WBC 7.1, hemoglobin 11.2, MCV 88.7, platelet count 94,000. Patient complained of nausea for a week post chemo. Already getting Emend, Aloxi and Decadron. Added Omeprazole 40 mg daily orally.   · 8/17/16 - Urine culture with >100,000 E-coli.   · 8/25/16 - Cycle 2 chemotherapy given.   · 9/9/16 - Magnesium 1.3, replaced intravenously. Potassium 3.4 (3.6-5.1), increased oral potassium supplementation.    · 9/16/16 - Cycle 3 chemotherapy given.    · 9/19/16 - Comprehensive metabolic panel with no significant abnormality.   · 9/20/16 - CT abdomen and pelvis with evidence of progressive metastatic disease in the abdomen and pelvis with interval enlargement of several soft tissue attenuations and subcutaneous nodules in the anterior abdominal wall. Interval enlargement of a left pelvic soft tissue attenuation mass. A lentiform area of low attenuation in the dome of the liver stable in size. Multiple anterior abdominal wall hernias containing fat and/or bowel. Marked pelvic floor laxity. CT chest negative for evidence of metastatic disease. Tiny pulmonary nodules in the right lung stable since 2013 consistent with a benign etiology.   · 9/23/16 - Macrobid 100 mg p.o. b.i.d. x7 days prescribed for UTI. Current chemotherapy discontinued after discussion and new chemotherapy orders written. Carboplatin AUC-5 IV day 1 and Doxil (Liposomal Doxorubicin) 30 mg/M2 IV day 1 to cycle q. 21 days.  of 18 (0-35). Magnesium 1.6, replaced intravenously. Comprehensive metabolic panel with potassium 3.4 (3.6-5.1).     · 10/13/16 - Patient started on cycle 1 of Carboplatin and Liposomal Doxorubicin followed by Neulasta.   · 11/3/16 - Cycle 2 Carbo and Doxil given.   · 11/17/16 - Magnesium 1.0, replaced intravenously. Oral potassium supplement increased.   · 11/29/16 - CT chest with small non-calcified right pulmonary nodules unchanged. Benign bone island in the midthoracic vertebral body along with benign bony hemangiomas in the middle thoracic vertebral bodies. CT abdomen and pelvis with mild progressive metastatic disease from CT of September 2016. Anterior abdominal wall mass superiorly mildly increased in size with new area noted as described measuring 3 x 1.7 cm. 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 Stable area of decreased uptake in the dome of the liver not hypermetabolic on recent PET scan, likely representing  cyst or focal fatty infiltration. Stable small hiatal hernia, fat-containing Bochdalek’s hernia and anterior abdominal wall hernias with stable pelvic floor relaxation.    · 12/1/16 - Cycle 3 chemotherapy given.   · 12/8/16 - Current chemotherapy discontinued and patient started on Gemcitabine 1000 mg/M2 IV days 1, 8, 15 q. 28 days.   · 12/22/16 - Patient seen in followup by Juliana Roy M.D. at the pain clinic, treated with Oxycodone and Lyrica. Transaminases normal. Patient started cycle 1 chemotherapy.   · 12/29/16 - Magnesium 1.1 (1.8-2.5), replaced intravenously.   · 1/5/17 - Cycle 1 day 15 chemotherapy held as patient leaving for vacation to [place]. Chemotherapy dose decreased by 20%. Patient complains of diarrhea for which Imodium prescribed.   · 1/11/17 - BRCA-1 and BRCA-2 negative.   · 1/12/17 - Echocardiogram with ejection fraction 60% or greater.   · 1/19/17 - Comprehensive metabolic panel with no significant abnormality. Patient started cycle 2 chemotherapy.   · 2/2/17 - Urine with >100,000 E-coli treated with Cipro 500 mg p.o. b.i.d. x1 week.   · 2/6/17 - Magnesium 1.1 (1.8-2.5), replaced intravenously.    · 2/23/17 - Patient started cycle 3 chemotherapy.   · 2/27/17 - CT chest with interval development of too numerous to count very small pulmonary nodules, ill-defined ground glass opacities concerning for development of pulmonary metastatic disease. Stable left-sided chest port. CT abdomen and pelvis with stable appearance of multiple small soft tissue densities within the abdomen near midline subcutaneous fat of the abdominal wall. Interval decrease in size of largely calcified left pelvic mass and multiple fat in bowel containing small ventral hernias.   · 3/6/17 - Pulmonary consultation obtained for lung nodules. Discussed CT results with patient. Decision made to continue present chemotherapy until further lung evaluation as abdominal disease better.  of 18 (0-35).    · 3/16/17 -  Patient started cycle 4 chemotherapy, but treatment held from day 8 onwards because of hospitalization.   · 3/19/17 - Patient was hospitalized at [facility] between 3/19/17 and 3/25/17 with chest pain. Chest x-ray had revealed increased mild bibasilar airspace disease. Troponin I and EKG’s were negative. INR was elevated. Patient was treated with antibiotics. EGD was performed revealing small hiatal hernia and esophageal dilatation was performed. Bronchoscopy was performed also with no intrabronchial lesions identified. IgA was 51 (), IgG 320 (600-1500) and IgM 19 (). Lexiscan nuclear stress test had no evidence of reversible myocardial ischemia with normal left ventricular ejection fraction of 58%. CT chest had development of patchy bilateral ground glass opacities most pronounced involving the left upper lobe and bilateral lower lobes. Multiple tiny bilateral pulmonary nodules were less conspicuous. The patient underwent a bronchoscopy by Jerica Esparza M.D. with right upper lobe bronchoalveolar lavage revealing benign squamous cells and bronchial cells with pulmonary macrophages present. There was mild acute inflammation. Negative for malignant cells. Prilosec was changed to Famotidine for hypomagnesemia.    · 4/6/17 - Magnesium 1.2 (1.8-2.5). Comprehensive metabolic panel with no significant abnormality. Patient seen in follow-up by Fito Ram M.D. at [facility]. Treated with Lyrica and Oxycodone.    · 5/4/17 - Patient complains of a rash itching on her right upper arm. Eczematous rash noted and patient prescribed Cyclocort 0.1% b.i.d. Magnesium infusions continued with each chemo. Order written to resume chemotherapy.   · 5/16/17 - Cycle 5 chemotherapy started.   · 5/26/17 - Magnesium 1.4 (1.8-2.5), replaced intravenously. Potassium 2.9 (3.6-5.1), KCL increased to 20 mEq three in the morning and three in the evenings.   · 5/30/17 - PET scan with remaining metabolic activity within the  nodular areas. The suggested mass which is partially calcified and necrotic within the left aspect of the pelvis seems slightly larger with increased metabolic activity. There was more calcification in these areas compared to prior study, suggesting inflammation.      · 6/8/17 - Patient complaining of shortness of breath. Does take HCTZ at home. Chest x-ray ordered and chemotherapy continued along with weekly magnesium replacement. Chest x-ray with no acute cardiopulmonary abnormalities.   · 6/13/17 - Patient received cycle 6 of chemotherapy.   · 7/31/17 - WBC 5.0, hemoglobin 11.2, MCV 89.7, platelet count 217,000. Patient is to resume chemotherapy starting with cycle 7 on Wednesday of this week.  of 19 (<35). Potassium 3.3 (3.5-5.3).     · 8/2/17 - Patient began cycle 7 of chemotherapy.   · 8/30/17 - Patient started cycle 8 chemotherapy.   · 9/5/17 - Patient seen in followup at Pain Management by Fito Ram M.D. and continued on treatment with Oxycodone and Lyrica.   · 9/13/17 - Patient was hospitalized at [facility] for a day with dizziness secondary to dehydration, hypokalemia and anemia. She was given 1 unit of packed red blood cells and electrolytes were replaced. Chest x-ray revealed a subtle opacity in the left lateral lung base favored to represent atelectasis. Magnesium 1.3 (1.5-2.5), patient continued on replacement.    · 9/22/17 - Chemotherapy and magnesium replacement continued with decrease in chemotherapy dose by 10% secondary to cytopenias.   · 9/25/17 - Cycle 9 chemotherapy started.   · 10/12/17 - CT of the chest with contrast showed several tiny pulmonary nodules. One within the right lower lobe appears new from prior exams. Two adjacent foci of nodularity within the medial right lower lobe suggestive of minor infectious or inflammatory process. CT of the abdomen and pelvis with contrast which showed soft tissue nodules along the anterior abdominal and pelvic walls unchanged from previous  scan. Also a partially calcified mass within the left pelvis unchanged. No acute process identified within the abdomen or pelvis. Several left paracentral ventral hernias unchanged. No evidence of acute bowel obstruction.   · 10/16/17 - Order written for Levaquin 500 mg p.o. daily as the patient states that she has had a productive cough, fever and chills and with the results of the CT scan showing a possible infectious versus inflammatory process. Order also written to continue chemotherapy and magnesium replacement as previously prescribed.   · 10/23/17 - Cycle 10 chemotherapy started.   · 11/19/17 - Patient went to the Emergency Room at [facility] complaining of right lower extremity redness and fever for a day. Venous Doppler had no evidence of a DVT and patient was discharged home on Clindamycin.   · 11/21/17 -  of 17 (0-35).   · 12/4/17 - Cycle 11 chemotherapy started.   · 12/20/17 - PET scan with multiple hypermetabolic soft tissue nodules abutting the anterior abdominal wall, stable from previous examination. Peripherally calcified left lower quadrant mass near the ascending colon stable in size demonstrating no hypermetabolic uptake. Previously had maximum SUV of 7. Right medial basilar pulmonary nodules resolved.   · 12/22/17 - CT left lower extremity with soft tissue swelling with skin thickening present along the anterior and medial aspect of the leg, slightly more pronounced around the palpable marker seen within the upper medial aspect of the lower extremity.   · 12/26/17 - Elastic stockings prescribed for lower extremity edema.   · 1/8/18 - Patient hospitalized at [facility] between 1/8/18 and 1/11/18 with fever of up to 101 degrees and painful rash on the lower extremities of several weeks’ duration. She was found to be hypokalemic and MRI of the lower extremities revealed thickening and swelling. Chest x-ray had no acute cardiopulmonary abnormality. Skin biopsy was performed by Surgery revealing stasis  dermatitis and no evidence of malignancy. Infectious Disease consultation was obtained and IV antibiotics were stopped. Blood cultures had no growth.   · 1/22/18 - Patient asked to start wearing elastic stockings which she has not started yet. She was given a prescription for Dyazide 1 p.o. daily.   · 2/26/18 - Ordered chemo to resume again. Patient unaware that she was supposed to resume chemo after her last visit in January. Continue magnesium infusions on day 1, 8 and 15. Prescribed Levaquin 500 mg p.o. daily x10 days for productive cough x1 week. History of viral illness consistent with influenza.  of 18 (0-35). Chest x-ray with no acute process.    · 3/5/18 - Cycle 12 chemotherapy started.   · 3/26/18 - Options discussed with patient. Decision made to discontinue IV Gemzar and orders written to start on Niraparib (Zejula) 300 mg p.o. daily as maintenance therapy.   · 4/11/18 - Niraparib discontinued due to insurance denial. Orders written to start Carboplatin-AUC 5 IV day 1 cycling every 21 days. Orders written for Neulasta 6 mg subcutaneous kit day 1 with palliative treatment intent and expected duration of treatment three months.   · 4/12/18 - CT chest, abdomen and pelvis with contrast revealed numerous tiny centrilobular nodules in the lungs favored to reflect infectious or inflammatory process. Nodules ranged 1-3 mm in size and are new from prior studies with metastatic disease not entirely excluded. Stable small hiatal hernia. Interval progression of metastatic disease involving the subcutaneous tissues at the ventral abdominal wall. Multiple soft tissue density nodules previously shown to be hypermetabolic on PET scan. New small crescent of low attenuation material along the periphery of the spleen with similar finding at the dome of the liver. Findings are concerning for peritoneal metastases. Density of the dome of the liver remained unchanged from multiple prior studies. Stable calcified mass in  the left pelvic sidewall. Urinary bladder wall thickening.   · 4/23/18 - Cycle 1 chemotherapy with Carboplatin administered along with Neulasta injection.   · 4/26/18 - Neulasta discontinued due to insurance denial.   · 5/14/18 - Patient received cycle 2 Carboplatin.   · 6/5/18 - Cycle 3 day 1 chemotherapy with Carboplatin administered.   · 6/20/18 - CT chest, abdomen and pelvis at [facility] showed re-demonstration of soft tissue mass in the ventral wall hernia left of the midline as well as the dome of the liver, likely representing metastatic disease similar as compared to the prior study. Additional calcified lesions present in the upper left hemipelvis and along the anterior abdominal wall to the right of the midline also likely representing metastatic disease. No definite new lesions were identified. Moderate to large stool burden likely related to mild constipation was present. No suspicious lung nodules were identified. The previously-described ground glass nodules appeared to have resolved. There was a stable subpleural nodule in the right upper lobe measuring 3 mm present since 2014 without significant changes.   · 6/26/18 - Cycle 4 day 1 Carboplatin administered with addition of Neulasta for febrile neutropenia prophylaxis.   · 6/29/18 - Reviewed CT scans with patient. Orders written to discontinue Carboplatin and Neulasta and plan to start Niraparib 300 mg by mouth daily as maintenance therapy. Prescribed Amlodipine 2.5 mg p.o. daily for chemo-induced hypertension.   · 7/18/18 - Orders written to increase weekly Magnesium Sulfate to 3 g IV weekly due to continued hypomagnesemia.   · 8/1/18 - Patient reports she has not received Niraparib (Zejula) to date.   · 8/30/18 - Patient’s phone was not working so though Niraparib approved, the drug company had not been able to get ahold of her. Patient supplied with drug company number so that she can start taking the pills.   · 9/6/18 -  of 20 (N).    · 9/18/18 - Urinalysis done for dysuria and back pain. Urine culture grew 20,000 to 50,000 colonies of urogenital ruy. Prescribed Bactrim DS one tablet twice daily for one week.   · September 2018 - Patient initiated on Zejula 300 mg p.o. daily.   · 10/1/18 - WBC 3.5, hemoglobin 12, platelet count 74,000. Niraparib (Zejula) dose held and then resumed when platelets above 100,000 at a dose of 200 mg daily.   · 10/15/18 - Patient received Niraparib (Zejula) at 200 mg p.o. daily with a platelet count of 198,000.   · 11/7/18 - WBC 6, hemoglobin 11.6, MCV 96.2, platelet count 137,000. Patient claims to have stopped taking Niraparib a few days prior because of cough. Asked to resume taking it. Ciprofloxacin 500 mg p.o. twice daily for one week for bronchitis.   · 12/3/18 - Magnesium Sulfate infusions changed to every two weeks, to send mag level before and give 3 grams. DC’d Magnesium Oxide and started Magnesium Plus Protein two pills twice daily.   · 1/4/19 - CT chest, abdomen and pelvis with new patchy nodular areas of airspace consolidation within the bilateral upper lobes, left greater than right, favored to be infectious or inflammatory. Interval enlargement of the peritoneal soft tissue masses within the pelvis compatible with progression of disease. Enlarging ventral hernia now containing multiple loops of small bowel and colon.   · 1/7/19 - Patient has not been coming in for weekly magnesium replacement as nursing has not been able to get ahold of her. Results of CT’s discussed with patient. Decision made to change her to IV chemotherapy with Carboplatin AUC-5 day 1 and pegylated liposomal Doxorubicin (Doxil) 30 mg/M2 IV day 1 to cycle every four weeks. Patient made aware of the limited choices of her treatment available.   · 1/31/19 - Echocardiogram showed LVEF 50-55% and otherwise normal echo Doppler study.   · 2/4/19 - New chemotherapy not initiated to date with difficulty contacting patient for  echocardiogram. Neulasta On-Pro 6 mg ordered with chemotherapy due to extensive prior exposure to chemotherapy, increasing risk of febrile neutropenia, history of neutropenic events on prior chemotherapy regimens.   · 2/15/19 - Patient started cycle 1 chemotherapy with Neulasta support.   · 3/6/19 -  of 28 (0-35).   · 3/15/19 - Cycle 2 Carboplatin with Liposomal Doxorubicin (Doxil) and Neulasta support initiated.     · 4/10/19 - Patient was requested to followup with Dr. Queen regarding hypotension and blood pressure medication dosing. Patient appears to be tolerating treatment well with cytopenias not requiring dose adjustments. No Doxil-related skin or mucus membrane toxicities or other significant toxicities to date.   · 4/12/19 - Cycle 3 chemotherapy given.   · 4/16/19 - CT chest, abdomen and pelvis with no evidence of active metastasis to the chest. Several tree-in-bud nodules within the periphery of the inferior right upper lobe likely infectious or inflammatory. Disease burden within abdomen and pelvis stable to slightly increased from prior CT. Specifically, a soft tissue mass along the right rectus muscle slightly increased in bulk. Multiple ventral hernias, some containing loops of bowel, with no evidence of acute bowel obstruction.   · 5/8/19 - Discussed CT results with patient. Overall stable disease and would like to continue same treatment. Dove soap and Hydrocortisone Cream p.r.n. prescribed for scattered rash on back.   · 5/10/19 - Cycle 4 Carboplatin and Liposomal Doxorubicin initiated.   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 It was ER positive, HER2/zuleima negative, PD-L1 negative. There was TOPO1 positivity and TUBB3 positivity. TMB was low (two mutations per megabyte MUTS/MB) and MSI was stable. There were 13 therapies considered with increased benefit. The 13 therapies with increased benefit included Fulvestrant,  Irinotecan, Letrozole, Topotecan, Anastrazole, Exemestane, Tamoxifen, all of which were referenced to [facility] as well as Abemaciclib, Everolimus, Gefitinib, Palbociclib, Ribociclib and Toremifene.     · 6/5/19 echocardiogram with preserved LV systolic function with EF around 60%.  · 6/7/19 cycle 5 chemotherapy with Neulasta support given.  Patient continued on mag oxide 400 mg p.o. twice daily and weekly IV 3 g mag sulfate infusions for persistent hypomagnesemia.  · 7/5/2019- cycle 6 chemotherapy with carboplatin and doxorubicin with Neulasta port initiated.  Ca125:  21 (0-35).  · 7/23/2019-CT chest abdomen and pelvis compared to CT from 4/16/2019 revealed multiple small pulmonary nodules unchanged from prior examination within the right upper and left lower lobes.  Complex ventral hernia similar to prior exam.  Soft tissue nodule along the right lateral margin of the right of the midline measuring 12 x 10 mm unchanged in size.  Irregular soft tissue nodular thickening along the margin of the most inferior aspect of the complex ventral hernia with thickness measuring up to 13 mm similar to prior examination.  Extensive calcified and soft tissue mass within the left lower quadrant decrease in size now measuring 2.6 x 2.3 cm previously 3.0 x 2.5 cm.  Partially calcified mass involving the right rectus sheath measuring 3.1 x 2.7 cm previously measured 3.3 x 2.9 cm.  Densely calcified mass measuring 2.1 x 1.6 cm unchanged previously measured 2 x 1.7 cm.  Right external iliac chain lymph node measuring 9 mm previously measured 5 mm.  · 8/2/2019 cycle 7 chemotherapy given.  · 8/30/2019-cycle 8 chemotherapy with carboplatin and doxorubicin with Neulasta support given.  · 9/27/2019 cycle 9 chemotherapy given.  · 10/1/19 - Echocardiogram showed Normal LV size and contractility EF of 60-65%. Normal RV size. Borderline left atrial enlargement. Aortic valve, mitral valve, tricuspid valve appears structurally normal, no significant  regurgitation seen.No pericardial effusion seen. Proximal aorta appears normal in size.  · 10/18/19 - Magnesium 1.5 (1.8-2.5).  IV magnesium replacement continued.  · 10/25/2019 cycle 10 chemotherapy given.  · 10/29/2019 patient had CT scan of the chest abdomen and pelvis-there is a new 2 mm nodule in the left lower lobe with a stable 2 mm nodule in the left lower lobe.  Follow-up in 6 months was recommended.  Stable multiple soft tissue density and calcified nodules within the ventral abdominal wall and left retroperitoneal fat consistent with nonviable metastatic disease.  These nodules have either decreased in size or stable.  · 11/22/2019 10 received cycle 11 of chemotherapy with Doxil and carboplatinum with Neulasta  · 12/8/2019 to 12/11/2019 patient was admitted to the hospital for neutropenic fever.  · 12/20/2019 patient received cycle 12 of chemotherapy with Doxil and carboplatinum with Neulasta  · 1/13/20: 2 D echo was normal with EF 56% to 60%  · 1/24/20-Patient received cycle 13 of combination chemotherapy with carboplatinum and Doxil  · 2/3/2020 patient had a  which was 25.4  · 2/21/2020-patient received cycle 14 of chemotherapy with carboplatinum and Doxil  · 3/6/2020 patient had CT scan of the chest, abdomen and pelvis this revealed a 3 mm nodule in the left lower lobe present on prior CT of the abdomen unchanged from July 2019.  Stable 3 mm right upper lobe nodule.  There is a lymph node in the AP window measuring 8 x 9 mm prominent left hilar lymph node measuring 12 mm previously was 7 x 7 mm no significant adenopathy was seen in the abdomen there is an area of irregular soft tissue in the left paramidline ventral hernia which is stable measuring 5.7 cm partially calcified mass in the right rectus measuring 3 x 2.5 cm which is also stable the prominent lymph nodes in the left mid hilum and mediastinum may be reactive or metastatic disease  · 4/17/2020-patient had cycle 15 of single agent  carboplatinum  · 5/26/2020 patient had CT scan of the chest, abdomen and pelvis showed 3 new lung nodules measuring 7 mm in the left upper lobe, 5 mm in the left lower lobe and 4 mm in the right lower lobe.  There were additional small lung nodules which were unchanged.  Mildly prominent mediastinal and bilateral hilar lymph nodes mildly increased in size.  Right hilar node 9 mm previously was 5 mm.  Carinal node 9 mm previously was 6 mm.  The nodule at the right side of the hernia sac has increased to 1.5 cm from 1.2 cm.  Also a nodule in the subcutaneous fat currently measures 2.4 was previously 2 cm.  · 5/15/2020-patient received cycle 16 of carboplatinum  · Since the last visit in the office patient patient developed dizzy spell and fell. For that reason she was taken to the emergency room at [facility].    · 7/3/2020 she had brain MRI which showed an 8 mm focus of abnormality in the left aspect of the posterior fossa corresponding to a dural based enhancing lesion thought to represent a calcified meningioma vessels calcified dural based metastasis.  This lesion has a slight local mass-effect and a small amount of surrounding edema.  There is also a 4 to 5 mm rounded focus of abnormal nodular enhancement along the cortex of the left parietal lobe but no significant mass-effect.  This is nonspecific could represent neoplastic process, infectious/inflammatory process or granulomatous disease.  · Patient was seen by neurosurgery, follow-up brain MRI in 8 weeks has been recommended by Dr. Hartman  · 7/9/2020 patient was initiated on single agent topotecan cycle 1 day 1  · 7/16/2020 she received the 8 topotecan  · 8/6/2020 patient received cycle 1 day 15 of topotecan  · 9/2/2020 patient had brain MRI multiple hospital which showed interval increase in size of the metastasis in the left parietal lobe now measuring 9 x 7 mm.  Previously was 5 mm.  There has been interval increase in size of the left superior cerebellar  metastases now measuring 1.3 cm previously was 8 mm.  There was no new metastatic disease identified.  · 9/11/2020 patient received cycle 2-day 15 of single agent topotecan  · Was admitted to the hospital 10/1/2020 for supratherapeutic INR  · 10/13/2020 patient was seen by Dr. Delong she has started stereotactic brain radiation to be completed on 10/28/2020  · 11/6/2020 patient received cycle 3-day 1 of chemotherapy with topotecan  · 11/13/2020 patient received cycle 3-day 8 of topotecan  · 11/30/2020 patient had CT scan of the chest, abdomen and pelvis this showed new reticular nodular interstitial thickening within the right lung mostly in the right middle lobe and right lower lobe worrisome for lymphangitic spread of cancer.  Evidence of progression of metastatic disease in the chest, abdomen and pelvis the new tiny sclerotic foci within the spine worrisome for osseous metastatic disease.  · 12/17/2020 - Discontinued topotecan due to progressive disease with orders written to begin Alimta 500 mg per metered squared IV q. 21 days  · 12/30/2020 - Started cycle 1 day 1 Alimta   · 1/18/2021 patient had bone scan which showed chest metastatic disease.  Xgeva has been ordered  · 3/12/2021 patient received cycle 1 of combination chemotherapy with cisplatinum and  Gemzar  · 4/23/21: Patient had cycle 2 day with cis-platinum and Gemzar  · For 30 2021 patient received cycle 2-day 8 of chemotherapy with cis-platinum and Gemzar     2. Deep vein thrombosis of left upper extremity diagnosis established in October of 2013.  · 10/16/13 - Venous Doppler of left upper extremity.  Impression:  Deep vein thrombosis involving the subclavian, axillary and brachial veins on the left side, superficial vein thrombosis involving the left basilic vein.  · 10/16/13 - Order written for Lovenox 120 mg subcutaneously daily until INR is in therapeutic range.  Order written for Coumadin 5 mg p.o. daily.  The patient is to follow PT and INR  protocol.  · 5/20/16 - Venous Doppler bilateral lower extremities normal.  · 7/30/19 - Patient continued on Coumadin following the INR protocol.      3. Anemia diagnosis established in November 2013 and pernicious anemia diagnosis established March 2016.   · 11/15/13 - Hemoglobin is 7.8.  · 12/2/13 - Orders written to start Aranesp 200 mg subcutaneous every two weeks due to low hemoglobin secondary to chemo induced anemia.  Hemoglobin is 9.7.  Anemia workup is ordered.  · 12/03/13 - Ferritin 179.8 (N), folic acid 8.3 (N), vitamin B12 314, iron 104 (N), TIBC 298 (N), iron saturation 35 (N), Reticulocyte count 0.88 (N).  EPO level 124 (N).  Haptoglobin 22 (H).  · 10/22/14 - Hemoglobin 12.5, hematocrit 37.3, MCV 91.6.  · 10/23/14 - Anemia resolved.   · 3/8/16 - WBC 5.8, hemoglobin 11.7, MCV 90.3, platelet count 245,000. Vitamin B12 of 189 (180-914), ferritin 61 (), iron 91 (), TIBC 345 (228-428).   · 3/15/16 -  ().        · 3/22/16 - Intrinsic factor antibody positive, antiparietal antibody negative. Vitamin B12 at 1000 mcg IM weekly started, but the patient after receiving first dose on 3/22/16 did not come back for injections until 4/13/16.   · 4/13/16 - WBC 5.1, hemoglobin 12.5, MCV 87.9, platelet count 201,000. Patient given B12 injection and then continued at home.   · 5/10/16 - WBC 5.1, hemoglobin 12.5, platelet count 201,000.   · 6/14/16 - Monthly Vitamin B12 injections continued at home.   · 7/11/16 - WBC 5.48, hemoglobin 12.7, MCV 86.2, platelet count 200,000.   · 8/16/16 - Patient continued on monthly Vitamin B12 injections at home.   · 1/5/17 - WBC 2.6 with 38% neutrophils, 56% lymphocytes, 5% monocytes, hemoglobin 10.5, .3, platelet count 63,000. Patient continued on Vitamin B12 injections 1000 mcg IM monthly at home.   · 3/20/17 - Retic 0.41 (0.5-1.5), creatinine 1.0 (0.4-1.0). Iron 12 (), TIBC 270 (228-428), ferritin 259 (), haptoglobin 340 (), TSH 0.43  (0.34-5.6), folate 6.0 (5.9-24.8). Serum protein electrophoresis revealed decreased gammaglobulins. Serum EDU had no monoclonal gammopathy identified. Stool Hemoccult was negative.   · 6/8/17 - WBC 6.3, hemoglobin 8.3, MCV 92.4, platelet count 50,000. Procrit 40,000 units subq weekly added for chemotherapy-induced anemia. Patient continued on Vitamin B12 at 1000 mcg IM monthly at home.   · 7/5/17 - Iron 33 (), TIBC 328 (228-428), ferritin 211 (), TSH 2.46 (0.34-5.6).       · 7/31/17 - WBC 5.0, hemoglobin 11.2, MCV 89.7, platelet count 217,000. We will continue with the Procrit 40,000 units subq weekly for chemo-induced anemia when the hemoglobin falls below 10.0 and the patient is also to continue with the Vitamin B12 at 1000 mcg IM monthly at home. Creatinine 1.24 (0.5-0.99).    · 8/28/17 - WBC 9.6, hemoglobin 8.7, MCV 93.7, platelet count 133,000. Patient is to continue with the Procrit 40,000 units subq weekly and will receive that today. She is also to continue with the Vitamin B12 at 1000 mcg IM monthly at home.  · 10/16/17 - WBC 7.5, hemoglobin 9.6, MCV 98.4, platelet count 195,000. Patient is to continue with Procrit 40,000 units subq weekly and will receive Procrit today.   · 1/1/18 - Creatinine 1.3 (0.4-1.0).    · 2/19/18 - Procrit given for hemoglobin 9.9.   · 2/26/18 - Continue Procrit 40,000 units weekly p.r.n. hemoglobin <10. Continue Vitamin B12 at 1000 mcg IM monthly at home.   · 3/26/18 - Hemoglobin 8.3. Procrit dose increased to 60,000 units weekly. Iron 29 (), TIBC 306 (228-428), and iron sat 9% (15-50). Iron 29 (), TIBC 306 (228-428), iron saturation 9% (15-50).   · 3/27/18 - Order written to start Ferrous sulfate 325 mg p.o. b.i.d.    · 4/2/18 - Stool heme negative x3.   · 4/30/18 - Patient had not started Ferrous sulfate 325 mg p.o. b.i.d. and verbalized understanding to do so and to notify the office if side effects are not tolerable.   · 5/24/18 - Patient was  hospitalized at [facility] between 5/24/18 and 5/26/18 with dark tarry stool. INR was subtherapeutic. She was given IV Protonix and Protonix 40 mg daily. She underwent an EGD by David Rogers M.D. revealing small hiatal hernia, small submucosal nodule near the cardia, erosive gastritis. Pathology on gastric antrum and body biopsy revealed iron pill gastritis and no evidence of Helicobacter pylori. She then underwent a colonoscopy revealing diverticulosis, hemorrhoids and surgical changes from previous resection. It was recommended to consider outpatient M2A if hemoglobin continued to drop.   · 6/4/18 - Order written to discontinue iron pills and to use Injectafer 750 mg IV days 1 and 8 for low iron sats. Order written for Procrit 40,000 units subq weekly. Iron 65 (), TIBC 328 (228-428), iron saturation 20% (15-50), ferritin 123 ().   · 6/29/18 - Discussed patient’s intolerance of oral iron due to gastritis. Oral iron discontinued with plans for Injectafer should iron level drop in the future.   · 11/7/18 - Patient claims not to be taking Vitamin B12 injections at home. Asked to resume those.   · 12/3/18 - Patient reports she has not been taking her B12 injections for a couple of months. She needs some syringes and needles for that.   · 2/4/19 - Patient was uncertain if she is currently taking ferrous Sulfate or not. Patient with history of intolerance and will follow hemoglobin.   · 5/8/19 - Ferritin 64 ().  · 8/27/2019- iron 52 (), iron saturation 14% (15-50), TIBC 364 (228-420), and ferritin 74 ().  Injectafer 750 mg IV day 1 and 8 due to oral iron intolerance ordered.  · 8/30/2019- Injectafer 750 mg IV day 1 given.  Hemoglobin 10.8.  At the end of the infusion heart rate was 48 and the patient reported mild dizziness.  Patient was sent to the ED for evaluation with symptoms resolving rapidly.  Chest x-ray and CT head were negative for acute findings.  · 9/6/2019-Injectafer 750 mg  IV day 8 given without adverse effects.  Hemoglobin 9.8.   · 9/25/19 - Iron 85 (). Iron saturation 25 (15-50). TIBC 335 (228-428). Ferritin  694 ().  Hemoglobin 10.3.  · 10/25/2019 hemoglobin 9.7.  Patient started on Retacrit 40,000 units subcu weekly.  · 12/17/2020 hemoglobin 11.9, hematocrit 38.3     Past Medical History:   Diagnosis Date   • Anemia 2013   • Cervical disc disorder 2013    Herniated disc, pinched nerve   • Clotting disorder (CMS/HCC) 2013    Low platelets from chemo   • Colon polyp 2013   • Deep vein thrombosis (CMS/HCC) 2013   • Diverticulosis 2013   • GERD (gastroesophageal reflux disease) 2016   • HL (hearing loss) 2016    I need hearing aids   • Hyperlipidemia 2013    Need my cholesterol rechecked   • Hypertension    • Joint pain 2013    Shoulder pain, torn rotator cuff   • Low back pain 2013   • Neuromuscular disorder (CMS/[facility]) 2015    Shingles, pinched nerves in my neck   • Osteopenia 2014   • Osteoporosis    • Ovarian cancer (CMS/[facility])     ovarian--  spread to lungs 10/2020   • Ovarian cancer on left (CMS/[facility]) 1995   • Pneumonia 2010    Have had it several times   • Spinal stenosis 2013    Cervical   • Urinary tract infection 2013    Have had several utis       Past Surgical History:   Procedure Laterality Date   • BRONCHOSCOPY N/A 2/10/2021    Procedure: BRONCHOSCOPY with bronchioalveolar lavage;  Surgeon: Jerica Esparza MD;  Location: [facility] ENDOSCOPY;  Service: Pulmonary;  Laterality: N/A;  post op:right lobe pneumonia   • CATARACT EXTRACTION     • COLON SURGERY     • COLONOSCOPY  05/26/2018   • EXPLORATORY LAPAROTOMY     • HERNIA REPAIR     • HYSTERECTOMY     • OOPHORECTOMY           Current Outpatient Medications:   •  acetaminophen (TYLENOL) 500 MG tablet, Take 1,000 mg by mouth Every 6 (Six) Hours As Needed for Mild Pain ., Disp: , Rfl:   •  albuterol sulfate  (90 Base) MCG/ACT inhaler, Inhale 2 puffs Every 4 (Four) Hours As Needed for Wheezing., Disp: 18 g, Rfl: 0  •   atorvastatin (LIPITOR) 20 MG tablet, Take 20 mg by mouth every night at bedtime., Disp: , Rfl: 3  •  azelastine (ASTELIN) 0.1 % nasal spray, 2 sprays into the nostril(s) as directed by provider Daily. Use in each nostril as directed, Disp: 30 mL, Rfl: 0  •  azithromycin (Zithromax Z-Victoriano) 250 MG tablet, Take 2 tablets the first day, then 1 tablet daily for 4 days., Disp: 6 tablet, Rfl: 0  •  calcium carbonate (Calcium 600) 600 MG tablet, Take 1 tablet by mouth 2 (two) times a day., Disp: 60 tablet, Rfl: 11  •  cetirizine (zyrTEC) 10 MG tablet, Take 1 tablet by mouth Every Night., Disp: 30 tablet, Rfl: 0  •  cyanocobalamin 1000 MCG/ML injection, Inject 1,000 mcg into the appropriate muscle as directed by prescriber Every 28 (Twenty-Eight) Days., Disp: , Rfl:   •  dexamethasone (DECADRON) 4 MG tablet, Take 2 tablets in the morning daily on days 2, 3 & 4.  Take with food., Disp: 6 tablet, Rfl: 5  •  famotidine (PEPCID) 40 MG tablet, TAKE 1 TABLET BY MOUTH EVERY MORNING BEFORE BREAKFAST, Disp: 90 tablet, Rfl: 1  •  guaiFENesin-codeine (Virtussin A/C) 100-10 MG/5ML liquid, Take 5 mL by mouth At Night As Needed for Cough., Disp: 120 mL, Rfl: 0  •  K Phos [place]-Sod Phos Di & Mono (Virt-Phos 250 Neutral) 155-852-130 MG tablet, TAKE 1 TABLET BY MOUTH TWICE DAILY FOR 2 DAYS, Disp: , Rfl:   •  magnesium oxide (MAG-OX) 400 MG tablet, Take 1 tablet by mouth 2 (Two) Times a Day., Disp: 60 tablet, Rfl: 3  •  montelukast (Singulair) 10 MG tablet, Take 1 tablet by mouth Every Night., Disp: 90 tablet, Rfl: 1  •  ondansetron (ZOFRAN) 8 MG tablet, Take 1 tablet by mouth 3 (Three) Times a Day As Needed for Nausea or Vomiting., Disp: 30 tablet, Rfl: 5  •  oxyCODONE (ROXICODONE) 10 MG tablet, Take 1 tablet by mouth Every 4 (Four) Hours As Needed for Moderate Pain ., Disp: 180 tablet, Rfl: 0  •  oxyCODONE (ROXICODONE) 10 MG tablet, Take 1 tablet by mouth Every 4 (Four) Hours As Needed for Moderate Pain ., Disp: 180 tablet, Rfl: 0  •   oxyCODONE (ROXICODONE) 10 MG tablet, Take 1 tablet by mouth Every 4 (Four) Hours As Needed for Moderate Pain ., Disp: 180 tablet, Rfl: 0  •  phosphorus (K PHOS NEUTRAL) 155-852-130 MG tablet, Take 1 tablet by mouth 2 (Two) Times a Day. Take 1 tablet every 12 hours for 2 doses and then stop., Disp: , Rfl:   •  potassium chloride (K-DUR,KLOR-CON) 20 MEQ CR tablet, Take 2 tablets by mouth Daily., Disp: 60 tablet, Rfl: 3  •  pregabalin (LYRICA) 100 MG capsule, Take 1 capsule by mouth 3 (Three) Times a Day., Disp: 90 capsule, Rfl: 2  •  sertraline (ZOLOFT) 50 MG tablet, Take 50 mg by mouth Every Night., Disp: , Rfl:   •  temazepam (RESTORIL) 30 MG capsule, TAKE 1 CAPSULE BY MOUTH AT NIGHT AS NEEDED FOR SLEEP, Disp: 30 capsule, Rfl: 2  •  triamterene-hydrochlorothiazide (DYAZIDE) 37.5-25 MG per capsule, TAKE 1 CAPSULE BY MOUTH EVERY DAY, Disp: 90 capsule, Rfl: 1  •  warfarin (COUMADIN) 5 MG tablet, Take 1 tablet by mouth Daily. At 1700 as directed, Disp: 30 tablet, Rfl: 0  No current facility-administered medications for this visit.    Facility-Administered Medications Ordered in Other Visits:   •  heparin injection 500 Units, 500 Units, Intravenous, PRN, Cindy Ball MD, 500 Units at 04/30/21 1259  •  sodium chloride 0.9 % flush 20 mL, 20 mL, Intravenous, PRN, Cindy Ball MD, 10 mL at 04/30/21 1259    Allergies   Allergen Reactions   • Morphine Nausea Only and Hives   • Penicillin V Potassium Rash   • Sulfa Antibiotics Rash   • Levaquin [Levofloxacin] Nausea Only and Dizziness     When taken with Coumadin   • Amoxicillin Rash       Family History   Problem Relation Age of Onset   • Hypertension Mother    • Cancer Father    • Hypertension Father    • Hypertension Sister    • Hypertension Brother    • Stroke Brother        Cancer-related family history includes Cancer in her father.    Social History     Tobacco Use   • Smoking status: Never Smoker   • Smokeless tobacco: Never Used   Vaping Use   •  Vaping Use: Never used   Substance Use Topics   • Alcohol use: No     Comment: caffeine 32-64oz qd   • Drug use: No     HPI, ROS and PFSH have been reviewed and confirmed on 4/30/2021.     SUBJECTIVE:    Patient is here today for follow-up.  She does not have any specific complaints today    REVIEW OF SYSTEMS:    Review of Systems   Constitutional: Negative for chills and fever.   HENT: Negative for ear pain, mouth sores, nosebleeds and sore throat.    Eyes: Negative for photophobia and visual disturbance.   Respiratory: Negative for wheezing and stridor.    Cardiovascular: Negative for chest pain and palpitations.   Gastrointestinal: Negative for abdominal pain, diarrhea, nausea and vomiting.   Endocrine: Negative for cold intolerance and heat intolerance.   Genitourinary: Negative for dysuria and hematuria.   Musculoskeletal: Negative for joint swelling and neck stiffness.   Skin: Negative for color change and rash.   Neurological: Negative for seizures and syncope.   Hematological: Negative for adenopathy.        No obvious bleeding   Psychiatric/Behavioral: Negative for agitation, confusion and hallucinations.     OBJECTIVE:  There were no vitals filed for this visit.  Temp 97.5   Pulse 64  RR 18  /92  Height 165.1 cm   Weight 81.6 kg   BSA 1.89  BMI 30    ECOG  (1) Restricted in physically strenuous activity, ambulatory and able to do work of light nature    Physical Exam   Constitutional: She is oriented to person, place, and time. No distress.   Obese    HENT:   Head: Normocephalic and atraumatic.   Mouth/Throat: Mucous membranes are moist.   Eyes: Conjunctivae are normal. Right eye exhibits no discharge. Left eye exhibits no discharge. No scleral icterus.   Neck: No thyromegaly present.   Cardiovascular: Normal rate, regular rhythm and normal heart sounds. Exam reveals no gallop and no friction rub.   Pulmonary/Chest: Effort normal. No stridor. No respiratory distress. She has no wheezes.   Left chest  wall port    Abdominal: Soft. Bowel sounds are normal. She exhibits no mass. There is no abdominal tenderness. There is no rebound and no guarding.   Musculoskeletal: Normal range of motion. No tenderness.   Lymphadenopathy:     She has no cervical adenopathy.   Neurological: She is alert and oriented to person, place, and time. She exhibits normal muscle tone.   Skin: Skin is warm and dry. She is not diaphoretic. No erythema.   Psychiatric: Her behavior is normal. Mood normal.   Nursing note and vitals reviewed.    I have reexamined the patient and the results are consistent with the previously documented exam. Cindy Ball MD     RECENT LABS    WBC   Date Value Ref Range Status   04/30/2021 3.69 3.40 - 10.80 10*3/mm3 Final   07/05/2020 4.04 (L) 4.5 - 11.0 10*3/uL Final     RBC   Date Value Ref Range Status   04/30/2021 3.35 (L) 3.77 - 5.28 10*6/mm3 Final   07/05/2020 3.68 (L) 4.0 - 5.2 10*6/uL Final     Hemoglobin   Date Value Ref Range Status   04/30/2021 9.8 (L) 12.0 - 15.9 g/dL Final   07/05/2020 11.5 (L) 12.0 - 16.0 g/dL Final     Hematocrit   Date Value Ref Range Status   04/30/2021 32.0 (L) 34.0 - 46.6 % Final   07/05/2020 35.3 (L) 36.0 - 46.0 % Final     MCV   Date Value Ref Range Status   04/30/2021 95.5 79.0 - 97.0 fL Final   07/05/2020 95.9 80.0 - 100.0 fL Final     MCH   Date Value Ref Range Status   04/30/2021 29.3 26.6 - 33.0 pg Final   07/05/2020 31.3 26.0 - 34.0 pg Final     MCHC   Date Value Ref Range Status   04/30/2021 30.6 (L) 31.5 - 35.7 g/dL Final   07/05/2020 32.6 31.0 - 37.0 g/dL Final     RDW   Date Value Ref Range Status   04/30/2021 17.5 (H) 12.3 - 15.4 % Final   07/05/2020 15.9 12.0 - 16.8 % Final     RDW-SD   Date Value Ref Range Status   04/30/2021 58.1 (H) 37.0 - 54.0 fl Final     MPV   Date Value Ref Range Status   04/30/2021 11.3 6.0 - 12.0 fL Final   07/05/2020 10.2 6.7 - 10.8 fL Final     Platelets   Date Value Ref Range Status   04/30/2021 163 140 - 450 10*3/mm3 Final    07/05/2020 158 140 - 440 10*3/uL Final     Neutrophil Rel %   Date Value Ref Range Status   07/05/2020 54.0 45 - 80 % Final     Neutrophil %   Date Value Ref Range Status   04/30/2021 55.9 42.7 - 76.0 % Final     Lymphocyte Rel %   Date Value Ref Range Status   07/05/2020 30.4 15 - 50 % Final     Lymphocyte %   Date Value Ref Range Status   04/30/2021 32.5 19.6 - 45.3 % Final     Monocyte Rel %   Date Value Ref Range Status   07/05/2020 12.4 0 - 15 % Final     Monocyte %   Date Value Ref Range Status   04/30/2021 9.5 5.0 - 12.0 % Final     Eosinophil %   Date Value Ref Range Status   04/30/2021 1.6 0.3 - 6.2 % Final   07/05/2020 3.0 0 - 7 % Final     Basophil Rel %   Date Value Ref Range Status   07/05/2020 0.2 0 - 2 % Final     Basophil %   Date Value Ref Range Status   04/30/2021 0.5 0.0 - 1.5 % Final     Immature Grans %   Date Value Ref Range Status   07/05/2020 0.0 0 % Final     Neutrophils Absolute   Date Value Ref Range Status   07/05/2020 2.18 2.0 - 8.8 10*3/uL Final     Neutrophils, Absolute   Date Value Ref Range Status   04/30/2021 2.06 1.70 - 7.00 10*3/mm3 Final     Lymphocytes Absolute   Date Value Ref Range Status   07/05/2020 1.23 0.7 - 5.5 10*3/uL Final     Lymphocytes, Absolute   Date Value Ref Range Status   04/30/2021 1.20 0.70 - 3.10 10*3/mm3 Final     Monocytes Absolute   Date Value Ref Range Status   07/05/2020 0.50 0.0 - 1.7 10*3/uL Final     Monocytes, Absolute   Date Value Ref Range Status   04/30/2021 0.35 0.10 - 0.90 10*3/mm3 Final     Eosinophils Absolute   Date Value Ref Range Status   07/05/2020 0.12 0.0 - 0.8 10*3/uL Final     Eosinophils, Absolute   Date Value Ref Range Status   04/30/2021 0.06 0.00 - 0.40 10*3/mm3 Final     Basophils Absolute   Date Value Ref Range Status   07/05/2020 0.01 0.0 - 0.2 10*3/uL Final     Basophils, Absolute   Date Value Ref Range Status   04/30/2021 0.02 0.00 - 0.20 10*3/mm3 Final     Immature Grans, Absolute   Date Value Ref Range Status   07/05/2020  0.00 <1 10*3/uL Final     [facility]   Date Value Ref Range Status   03/18/2021 0.0 0.0 - 0.2 /100 WBC Final       Lab Results   Component Value Date    GLUCOSE 79 04/23/2021    BUN 19 04/23/2021    CREATININE 1.00 04/23/2021    EGFRIFNONA 56 (L) 04/23/2021    EGFRIFAFRI >60 06/07/2019    BCR 19.0 04/23/2021    K 3.7 04/23/2021    CO2 25.0 04/23/2021    CALCIUM 9.7 04/23/2021    ALBUMIN 3.60 04/23/2021    LABIL2 1.3 07/03/2020    AST 18 04/23/2021    ALT 8 04/23/2021     ASSESSMENT:    1. 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  She was on carboplatinum, Doxil.  Patient had had carboplatinum Taxol, carbo Taxotere, Gemzar single agent, niraparib and was on Doxil and carboplatin.  Doxil was discontinued due to approaching of lifetime dosing.  Progressed on single agent topotecan and Alimta.  Refer to paradigm testing for future options at time of progression.  Patient has had progression of disease and therefore platinum was discontinued. On exam today I felt a right lower abdominal mass which is new.  She has CT scan of the chest, abdomen and pelvis on 3/1/2021 this showed moderate size pericardial effusion, scattered pulmonary nodules with septal thickening worse in the right lung compared to the left.  Pathologically enlarged hilar and mediastinal lymph nodes were suspicious for metastatic disease.  CT of the abdomen and pelvis showed increase in size of multiple soft tissue masses involving the anterior abdominal wall likely related to worsening of metastatic disease.  Increase in size of multiple sclerotic lesions within the lumbar spine and pelvis.  A 2D echocardiogram which did not reveal any significant pericardial effusion on 3/4/2021.  2. Patient has a subcutaneous nodule noted on the anterior abdominal wall consistent with metastatic disease currently measures 3.5 cm.  The anterior abdominal wall nodule has not significantly changed  3. Patient has bone  metastases therefore I recommended Xgeva 120 mg subcu monthly.  Reviewed the side effects, benefits in detail with patient.  She would also be initiated on Os-Johnny D 600 mg twice a day.  She will schedule with her dentist prior to initiating Xgeva.  4. Progressive brain metastasis: Status post stereotactic brain radiation under the care of Dr. Delong and recent MRI of the brain on 12/15/2020 shows probable progression.  Recent brain MRI did not show any obvious signs of progression.  5. B12 deficiency on parenteral B12 injections  6. History of DVT on anticoagulation therapy with warfarin:   7. Pancytopenia: Due to chemotherapy: On Procrit  8. Probable bone metastasis: As discussed above.  We will begin Xgeva  9. Hypomagnesemia: Continue to monitor levels and infuse as needed  10. Monitoring for chemotherapy toxicities on Alimta  11. Iron deficiency anemia: Monitoring  12. Post hospital visit  13. Assessment has been reviewed and updated as documented above    Discussion:    Reviewed her CT scans with patient.  She has progressive disease.  We will discontinue Alimta and begin combination of cis-platinum and Gemzar.  Reviewed the side effects in detail with patient to include but not limited to:  Chemotherapy side effects include, but not limited to, nausea, vomiting, bone marrow suppression, which can result in blood, platelet transfusion. There is also risk of permanent bone marrow destruction, which can cause myelodysplastic syndrome or leukemia years down the line. There is risk of infection which can result in hospitalization and even death. There is also risk of fatigue, asthenia, alopecia which could become permanent. Chemo will help to reduce risk of relapse of cancer, but does not eliminate risk completely.    Specifically cis-platinum can lead to renal insufficiency, electrolyte abnormalities, tinnitus, peripheral neuropathy.  Gemzar can also lead to significant thrombocytopenia, skin  toxicity.        PLAN:    1. Continue supportive care  2. Continue cis-platinum 30 mg/m2 IV day 1, Gemzar 600 mg/m2 IV days 1 and 8  every 28 days.  Sent to receive cycle 1 day 8 Gemzar today  3. INR today  4. Reviewed bone scan results  5. Continue Xgeva 120 mg subcu monthly  6. Calcium carbonate with Vitamin D 600 mg twice a day  7. Continue folic acid 1 mg p.o. daily -reviewed needs to stay on drug to prevent side effects from Alimta   8. Continue B12 injections 1000 mcg IM monthly, next due 1/14/2021  9. Status post IV Injectafer  10. Continue magnesium oxide 400 mg three times a day and weekly IV replacement as needed.   11. Continue Retacrit 40,000 units subcu weekly for hemoglobin less than 10, for chemotherapy-induced anemia  12. Continue supportive medications  13. MRI brain reviewed  14. Reviewed fever precautions   15. RTC in 4 weeks or earlier as needed for problems  16. All questions answered     I have reviewed labs results, imaging, vitals, and medications with the patient today.   Lab Results   Component Value Date    WBC 3.69 04/30/2021    HGB 9.8 (L) 04/30/2021    HCT 32.0 (L) 04/30/2021    MCV 95.5 04/30/2021     04/30/2021     Patient verbalized understanding and is in agreement of the above plans.        I spent 40 total minutes, face-to-face, caring for Tahira today.  90% of this time involved counseling and/or coordination of care as documented within this note.

## 2021-05-04 NOTE — TELEPHONE ENCOUNTER
Attempted to call pt to see if she took the phosphorus supplement that was called into her pharmacy last week since her labs were still abnormal. No answer or identifying VM. Callback number left.

## 2021-05-04 NOTE — PROGRESS NOTES
Pt stated she took neutra phos over the weekend. Will recheck her phosphorus level at her lab appointment tomorrow.

## 2021-05-05 NOTE — TELEPHONE ENCOUNTER
Notified Dr. Ball and medical staff of the pt c/o cough without medication relief. Advised pt that the office will call her back once we have direction from the provider.  She v/u and was happy for the help.

## 2021-05-05 NOTE — TELEPHONE ENCOUNTER
CT scan scheduled for Thursday, 5-6-20212, at 11:30AM with arrival time of 10:15AM.  Patient notified of date and time.  Patient instructed not to eat anything 4 hours prior to scan.  Informed her that she could take medications with plain/unflavored water.  Patient v/u.

## 2021-05-06 NOTE — PROGRESS NOTES
Hematology/Oncology Outpatient Follow Up    PATIENT NAME:Tahira Zimmerman  :1955  MRN: [id number]  PRIMARY CARE PHYSICIAN: Noemy Queen MD  REFERRING PHYSICIAN: Noemy Queen MD    Chief Complaint   Patient presents with   • Appointment     Malignant neoplasm of right ovary (CMS/HCC)   • Follow-up      HISTORY OF PRESENT ILLNESS:     1. Recurrent ovarian cancer diagnosis established in 2013.  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  The patient was treated with surgery and then postoperatively with adjuvant chemotherapy consisting of carboplatin and Taxol.  Six months later, she had recurrence of disease intra-abdominally between the rectum and vagina, which was treated with intraabdominal peritoneal chemotherapy.  She had been free of disease since that time.  She reported feeling a mass in the left side of her abdomen approximately six months ago.  This gradually grew and in early 2013 she had her daughter feel the mass.  The daughter works for Dr. David Rogers and the patient at that time also complained of intermittent rectal bleeding.  Consultation with Dr. David Rogers was obtained.  The patient had a CT scan of the abdomen and pelvis performed on 13 revealing left lower quadrant mass measuring 9.7 x 9.3 x 6 cm demonstrating areas of dense calcification, soft tissue density and cystic density within it.  Stromal tumor is felt to be most likely a possibly fibroma.  It is generated with necrosis and calcification.  Possible palpable mass in the rectus muscle is seen with calcification and deformity of the rectus muscle and just below this a second mass intra-abdominally was seen measuring 2 cm in the greatest diameter suspicious for second intraabdominal tumor.  The mass separate from the largest mass measuring 2.6 cm in the dependent portion of pelvis is seen on the right of  the midline.  No retroperitoneal lymphadenopathy was seen.  No free air, free fluid or other abnormality was present.  The patient was scheduled for an outpatient colonoscopy, but had syncopal episode while sitting in the toilet the night before and was brought to the emergency room early in the morning by her daughter and son-in-law.  The patient had apparently stopped breathing for a few seconds and did not have a pulse, but started breathing before CPR could be initiated.  In the emergency room, the patient had an EKG revealing sinus rhythm nonspecific T-wave flattening; metabolic panel revealed a glucose of 148, creatinine of 1.3, CBC was normal.  She was treated with intravenous fluid.  The patient’s case was then discussed with Dr. Anabell Monique and patient was subsequently admitted to [facility].  The patient underwent a colonoscopy by Dr. David Rogers on 06/27/13 revealing sigmoid diverticulosis and grade II internal and external hemorrhoids, but no mass or colitis was seen.  CT-guided biopsy of the mass was performed on 06/27/13 as well revealing metastatic papillary adenocarcinoma with numerous psammoma bodies.  Pathology comment stated prior records were not available; however, with history of ovarian cancer the current biopsy would be consistent with metastases from that malignancy.  Oncology consult was obtained and I initially saw the patient on 06/27/2013 where the patient had claimed to have little bit of pain in the area of disease.  She reported being frequently constipated, denies any weight loss or fevers.  She did report having some night sweats, but thought that was because of her menopause.  On 06/27/13 metastatic workup including labs and CT scans were initiated.  CT scan of the chest on 06/27/13 revealed no acute disease in the chest.  A 1.4 cm size focal area of decreased density was noted in the lateral aspect of the right lobe of the liver consistent with a benign cyst  or hemangioma.  There was a very small hiatal hernia measuring 2.2 cm in diameter.  A CT scan of the head performed 06/27/13 revealed no acute intracranial abnormality noted.  CA-125 was 40 units/ml (0-35), CA 19-9 was 11.8 units/ml (0-35), CEA level was 0.8 ng/ml (0-3), TSH level was 1.09 IU/ml (0.34-5.6).  The patient was in workup for the syncopal episode, a carotid Doppler was performed on June 28 revealing an essentially normal study showing a reduction in diameter from 0-15% bilaterally.  A Holter monitor was completed on 06/27/13, which was unremarkable except for a few premature atrial contractions.  The patient was felt stable and subsequently was discharged home on 06/28/13.  Post discharge, the patient was seen at [facility] on 07/05/13 by Dr. Kathryn Thrasher who discussed treatment options with the patient including surgery.  The patient is in the office today 07/16/13 in follow up post hospitalization.  She is reporting that surgery is planned for July 30th of this month at .  · 7/30/13 to 8/3/13 - The patient was in [facility].  The patient was admitted for surgery for her recurrent ovarian cancer.  The patient had exploratory laparotomy, omentectomy, bowel resection, liver biopsy and appendectomy.  Pathology revealed of the omentum metastatic serous carcinoma of the transverse colon, segmental resection showed metastatic serous carcinoma involving mesenteric fat.  The liver wedge resection showed fibrosclerotic thickening of the hepatic capsule.  Benign liver parenchyma.  No evidence of metastatic tumor.  Appendix showed fibrous obliteration of the lumen.  Negative for metastatic carcinoma.  Rectum and sigmoid colon segmental resection showed metastatic serous carcinoma invading the colorectal mucosa uninvolved by tumor.  Vaginal mass showed metastatic serous carcinoma.  Margins are positive for tumor.  · 9/24/13 - Chemotherapy orders were written for  patient to start carboplatin 550 mg IV day one and Taxol 330 mg IV day one to be cycled every three weeks.  · 10/10/13 - The patient started cycle #1 of chemotherapy consisting of Carboplatin and Taxol.  · 09/25/13 -  is 10.6 normal.  · 10/01/13 - CT of the chest, abdomen and pelvis revealed new reticular nodular occupancy in the anterior segment of the right upper lobe, could reflect an inflammation or infectious etiology or could reflect unusual persistence of metastatic tumor.  New liver lesions, the smaller one appears to be implant on the surface of the liver, the larger may also represent an implant including the intrahepatic.  Several small mesenteric nodes seen throughout the abdomen and pelvis.  · 10/02/13 - Port placed by Dr. Sen.  · 10/24/13 - Orders written to start Neupogen daily and if more than three Neupogen are needed to give Neulasta after next chemo.  ANC is 0.15.  · 10/31/14 - Patient given cycle 2 of chemotherapy with Taxol and Carboplatin.  · 11/21/13 - The patient started cycle 3 of chemotherapy consisting of Carboplatin and Taxol.  · 11/26/13 - CT scan of chest, abdomen and pelvis revealed no evidence of active disease in the chest.  Reticulonodular opacity has resolved.  Metastatic lesion impressing upon the hepatic dome similar to prior exam.  No evidence of a change.  No evidence of new disease.  Postsurgical changes in the pelvis and throughout the retroperitoneum in the large bowel.  Finding containing anterior abdominal wall hernia containing fat tissue and enhancing vessels, 3 cm in diameter.  · 12/2/13 - Orders written to start Neupogen per protocol as well as Aranesp due to low hemoglobin and ANC.  ANC is 0.14.  Hemoglobin is 9.7.  · 12/11/13 - Orders written to hold chemotherapy until next week and decrease dose by 20% due to low platelet count.  Platelet count is 85,000.  · 12/16/13 - The patient received cycle 4 of Carboplatin and Taxol at a 20% dose reduction so Taxol dose  is 260 mg and Carboplatin is 440 mg.  · 12/16/13 - CA-125 12.6 (N).  · 12/19/13 - PET/CT scan.  Impression:  1. There is no evidence of hypermetabolism in the liver.  Specifically of the dominant lesion in or adjacent to the right hepatic dome that was seen and described on the recent CT study of 11/26/2013.  It is photopenic relative to the surrounding liver.  2.  There is no evidence of hypermetabolic soft issue mass lesion or free fluid in the abdomen or pelvis.  3.  The tonsillar tissues are slightly enlarged and have moderate activity level.  This maybe physiologic.  Correlation with oral cavity, examination is recommended.  4.  Mild amount of activity in the right level II jugular lymph chain without focally enlarged lymph nodes is of questionable significance.  · 1/6/13 - The patient received cycle 5 of chemotherapy with Taxol and Carboplatin.  · 1/27/14 - The patient started on cycle 6 of Taxol and Carboplatin.  · 2/18/14 - CT of the abdomen and pelvis with contrast; stable lesions impressing upon the hepatic dome, unchanged compared to the prior exam.  Multiple anterior abdominal wall hernias re-demonstrated.  Those above the umbilicus contain omental fat.  Below and to the left of the umbilicus as anterior abdominal hernia containing omental fat in nondilated small bowel which is larger than seen previously.  · 2/20/14 - The patient received cycle 7 of chemotherapy with Taxol and Carboplatin.   · 3/11/14 - Order written to discontinue chemotherapy due to patient has completed 7 cycles of chemotherapy and CT scans suggest possible remission.  · 3/11/14 -  11.0 (N).  · 06/05/14 - CT scan of the chest abdomen and pelvis with contrast: There are multiple anterior abdominal wall hernias.  There are 2 larger anterior abdominal wall hernias at the level of the transverse colon, which contain omental fat.  There are at least 2 small anterior abdominal wall hernias that contain omental fat.  There is a  moderate sized anterior abdominal wall hernia at the level of the umbilicus, eccentric to the left, which contain non-dilated bowel.  Interval decrease in the size of two deposit impressing on the liver.  The third tiny deposit has been stable.  · 8/19/14 -  10.4 (N).  · 12/19/14 -   10.0 (N)  · 1/7/15 - CT chest, abdomen and pelvis with stable appearance of the chest with several micronodules. Small hiatal hernia, slightly larger than previous exam, increased from 1.5 to 2.5 cm in diameter. Bochdalek hernia unchanged. Multiple hepatic lesions. The two dominant lesions at the right hepatic dome had decreased in size from previous. The remaining tiny nodules measured 3-5 mm in diameter and were unchanged. There was no evidence of new intra-abdominal or pelvic mass or free fluid. There were multiple anterior abdominal wall hernias which had increased in size.   · 7/27/15 - Comprehensive metabolic panel with no significant abnormalities. CA-125 of 21 (0-35).   · 10/26/15 - WBC 6, hemoglobin 13, platelet count 204,000. Heart rate 47. CA-125 of 20 (0-35).    · 2/18/16 - CT chest with contrast with stable micronodular density seen in the lungs without significant change from prior exam. CT abdomen and pelvis with regression of liver lesions with no new evidence of metastasis. Multiple ventral hernias again noted without significant change from prior exam. Creatinine 0.9 (0.6-1.3).    · 2/25/16 - Patient hospitalized at [facility] between 2/25/16 and 2/27/16 with pyelonephritis secondary to E-coli. She was given two days of IV Rocephin and then placed on oral Levaquin. She was asked to hold her Coumadin, but then had nausea and vomiting because of Levaquin and stopped the Levaquin also. Her CA-125 was 32 (0-35) and CEA 1.3 (0-5). Her urine grew >100,000 E-coli. CT of the abdomen and pelvis was done which revealed 4.1 x 1.8 cm sized mild ovoid area of decreased density in the liver parenchyma along  the anteromedial aspect of the dome of the right lobe of the liver, unchanged significantly since the previous study of 2/18/16. There was suspicion of a very small pericardial effusion. There was suspicion of a small hiatal hernia. There were several hernias in the anterior abdominal wall. A 3.5 x 3.1 cm incised well-circumscribed abnormal density in the left retroperitoneal fatty soft tissue at the level of the left iliac crest was suggestive of tumor recurrence. There was an abnormal density in the left retroperitoneal soft tissues in the left flank that partially surrounded the left kidney and extended downward to the left pelvic area. Diverticulosis of the distal descending colon and sigmoid colon were seen.     · 3/8/16 - Patient prescribed Bactrim DS 1 p.o. b.i.d. for 10 days for pyelonephritis, which was partially treated with Rocephin and Levaquin. Urine with 40,000 E-coli treated with Macrobid for 7 days.  of 20 (0-35).    · 3/31/16 - PET scan with area of low density anteriorly in the right lobe of the liver with no significant radiopharmaceutical uptake to suggest malignancy. Multiple round soft tissue density masses showing increased radiopharmaceutical activity and multiple anterior abdominal wall hernias.   · 5/10/16 - Patient complains of venous enlargement of the left chest wall around the port. Has not been seen by  yet, but has an appointment on 5/23/16. Complained of a cough.   · 5/12/16 - Infusaport contrast study with no evidence of fibrin sheath. Chest x-ray with mild cardiac prominence.   · 5/23/16 - Patient seen in consultation by Sheng Bowman M.D. at [facility] and a chemotherapy trial recommended.   · 6/1/16 -   of 27.1 (0-35).    · 6/14/16 - WBC 5.71, hemoglobin 12.4, platelet count 188,000. Patient is scheduled for surgery at  on 6/16/16 after further discussion with  physicians. Discussed adjuvant chemotherapy.   · 6/16/16 - Excisional biopsy of abdominal  wall mass performed by Sheng Bowman M.D. at [facility] with pathology revealing metastatic high-grade papillary serous carcinoma.   · 7/7/16 - Received a call from the patient that Tramadol was not working and that she was given Norco #24 after her biopsy at  which did help her pain. Patient prescribed Norco 5/325 one p.o. q. 4-6 hours p.r.n. #60 with no refills. Echocardiogram with left ventricular inferior wall hypokinesis with ejection fraction of 50-55%.   · 7/8/16 - Patient seen in consultation by Sheng Bowman M.D. in consultation at  for metastatic high-grade papillary serous carcinoma diagnosed by excisional biopsy on 6/16/16 with carcinomatosis and patient not a surgical resection candidate. Genetic sequencing and susceptibility testing of tumor was ordered. Biopsy was done of anterior abdominal wall.   · 7/7/16 - Echocardiogram with left atrial enlargement. Mild mitral regurgitation. Left ventricular proximal inferior wall hypokinesis with ejection fraction of 50-55%.   · 7/11/16 - While waiting for genomics results, in order not to delay treatment further, order written for Taxotere 60 mg/M2 IV over one hour followed by Carboplatin AUC 5 over one hour, cycles to be repeated every three weeks.  Comprehensive metabolic panel normal.  26 (0-35).    · 725/16 - Pain contract signed for use of Norco.   · 8/5/16 - Patient stated that she experienced a red rash and was itchy post taking Norco. Norco discontinued and Tramadol refilled.   · 8/16/16 - Patient started cycle 1 chemotherapy. WBC 7.1, hemoglobin 11.2, MCV 88.7, platelet count 94,000. Patient complained of nausea for a week post chemo. Already getting Emend, Aloxi and Decadron. Added Omeprazole 40 mg daily orally.   · 8/17/16 - Urine culture with >100,000 E-coli.   · 8/25/16 - Cycle 2 chemotherapy given.   · 9/9/16 - Magnesium 1.3, replaced intravenously. Potassium 3.4 (3.6-5.1), increased oral potassium supplementation.     · 9/16/16 - Cycle 3 chemotherapy given.   · 9/19/16 - Comprehensive metabolic panel with no significant abnormality.   · 9/20/16 - CT abdomen and pelvis with evidence of progressive metastatic disease in the abdomen and pelvis with interval enlargement of several soft tissue attenuations and subcutaneous nodules in the anterior abdominal wall. Interval enlargement of a left pelvic soft tissue attenuation mass. A lentiform area of low attenuation in the dome of the liver stable in size. Multiple anterior abdominal wall hernias containing fat and/or bowel. Marked pelvic floor laxity. CT chest negative for evidence of metastatic disease. Tiny pulmonary nodules in the right lung stable since 2013 consistent with a benign etiology.   · 9/23/16 - Macrobid 100 mg p.o. b.i.d. x7 days prescribed for UTI. Current chemotherapy discontinued after discussion and new chemotherapy orders written. Carboplatin AUC-5 IV day 1 and Doxil (Liposomal Doxorubicin) 30 mg/M2 IV day 1 to cycle q. 21 days.  of 18 (0-35). Magnesium 1.6, replaced intravenously. Comprehensive metabolic panel with potassium 3.4 (3.6-5.1).     · 10/13/16 - Patient started on cycle 1 of Carboplatin and Liposomal Doxorubicin followed by Neulasta.   · 11/3/16 - Cycle 2 Carbo and Doxil given.   · 11/17/16 - Magnesium 1.0, replaced intravenously. Oral potassium supplement increased.   · 11/29/16 - CT chest with small non-calcified right pulmonary nodules unchanged. Benign bone island in the midthoracic vertebral body along with benign bony hemangiomas in the middle thoracic vertebral bodies. CT abdomen and pelvis with mild progressive metastatic disease from CT of September 2016. Anterior abdominal wall mass superiorly mildly increased in size with new area noted as described measuring 3 x 1.7 cm. 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 Stable area of decreased uptake in the dome of the liver not hypermetabolic  on recent PET scan, likely representing cyst or focal fatty infiltration. Stable small hiatal hernia, fat-containing Bochdalek’s hernia and anterior abdominal wall hernias with stable pelvic floor relaxation.    · 12/1/16 - Cycle 3 chemotherapy given.   · 12/8/16 - Current chemotherapy discontinued and patient started on Gemcitabine 1000 mg/M2 IV days 1, 8, 15 q. 28 days.   · 12/22/16 - Patient seen in followup by Juilana Roy M.D. at the pain clinic, treated with Oxycodone and Lyrica. Transaminases normal. Patient started cycle 1 chemotherapy.   · 12/29/16 - Magnesium 1.1 (1.8-2.5), replaced intravenously.   · 1/5/17 - Cycle 1 day 15 chemotherapy held as patient leaving for vacation to [place]. Chemotherapy dose decreased by 20%. Patient complains of diarrhea for which Imodium prescribed.   · 1/11/17 - BRCA-1 and BRCA-2 negative.   · 1/12/17 - Echocardiogram with ejection fraction 60% or greater.   · 1/19/17 - Comprehensive metabolic panel with no significant abnormality. Patient started cycle 2 chemotherapy.   · 2/2/17 - Urine with >100,000 E-coli treated with Cipro 500 mg p.o. b.i.d. x1 week.   · 2/6/17 - Magnesium 1.1 (1.8-2.5), replaced intravenously.    · 2/23/17 - Patient started cycle 3 chemotherapy.   · 2/27/17 - CT chest with interval development of too numerous to count very small pulmonary nodules, ill-defined ground glass opacities concerning for development of pulmonary metastatic disease. Stable left-sided chest port. CT abdomen and pelvis with stable appearance of multiple small soft tissue densities within the abdomen near midline subcutaneous fat of the abdominal wall. Interval decrease in size of largely calcified left pelvic mass and multiple fat in bowel containing small ventral hernias.   · 3/6/17 - Pulmonary consultation obtained for lung nodules. Discussed CT results with patient. Decision made to continue present chemotherapy until further lung evaluation as abdominal disease  better.  of 18 (0-35).    · 3/16/17 - Patient started cycle 4 chemotherapy, but treatment held from day 8 onwards because of hospitalization.   · 3/19/17 - Patient was hospitalized at [facility] between 3/19/17 and 3/25/17 with chest pain. Chest x-ray had revealed increased mild bibasilar airspace disease. Troponin I and EKG’s were negative. INR was elevated. Patient was treated with antibiotics. EGD was performed revealing small hiatal hernia and esophageal dilatation was performed. Bronchoscopy was performed also with no intrabronchial lesions identified. IgA was 51 (), IgG 320 (600-1500) and IgM 19 (). Lexiscan nuclear stress test had no evidence of reversible myocardial ischemia with normal left ventricular ejection fraction of 58%. CT chest had development of patchy bilateral ground glass opacities most pronounced involving the left upper lobe and bilateral lower lobes. Multiple tiny bilateral pulmonary nodules were less conspicuous. The patient underwent a bronchoscopy by Jerica Esparza M.D. with right upper lobe bronchoalveolar lavage revealing benign squamous cells and bronchial cells with pulmonary macrophages present. There was mild acute inflammation. Negative for malignant cells. Prilosec was changed to Famotidine for hypomagnesemia.    · 4/6/17 - Magnesium 1.2 (1.8-2.5). Comprehensive metabolic panel with no significant abnormality. Patient seen in follow-up by Fito Ram M.D. at [facility]. Treated with Lyrica and Oxycodone.    · 5/4/17 - Patient complains of a rash itching on her right upper arm. Eczematous rash noted and patient prescribed Cyclocort 0.1% b.i.d. Magnesium infusions continued with each chemo. Order written to resume chemotherapy.   · 5/16/17 - Cycle 5 chemotherapy started.   · 5/26/17 - Magnesium 1.4 (1.8-2.5), replaced intravenously. Potassium 2.9 (3.6-5.1), KCL increased to 20 mEq three in the morning and three in the evenings.   · 5/30/17 - PET scan with  remaining metabolic activity within the nodular areas. The suggested mass which is partially calcified and necrotic within the left aspect of the pelvis seems slightly larger with increased metabolic activity. There was more calcification in these areas compared to prior study, suggesting inflammation.      · 6/8/17 - Patient complaining of shortness of breath. Does take HCTZ at home. Chest x-ray ordered and chemotherapy continued along with weekly magnesium replacement. Chest x-ray with no acute cardiopulmonary abnormalities.   · 6/13/17 - Patient received cycle 6 of chemotherapy.   · 7/31/17 - WBC 5.0, hemoglobin 11.2, MCV 89.7, platelet count 217,000. Patient is to resume chemotherapy starting with cycle 7 on Wednesday of this week.  of 19 (<35). Potassium 3.3 (3.5-5.3).     · 8/2/17 - Patient began cycle 7 of chemotherapy.   · 8/30/17 - Patient started cycle 8 chemotherapy.   · 9/5/17 - Patient seen in followup at Pain Management by Fito Ram M.D. and continued on treatment with Oxycodone and Lyrica.   · 9/13/17 - Patient was hospitalized at [facility] for a day with dizziness secondary to dehydration, hypokalemia and anemia. She was given 1 unit of packed red blood cells and electrolytes were replaced. Chest x-ray revealed a subtle opacity in the left lateral lung base favored to represent atelectasis. Magnesium 1.3 (1.5-2.5), patient continued on replacement.    · 9/22/17 - Chemotherapy and magnesium replacement continued with decrease in chemotherapy dose by 10% secondary to cytopenias.   · 9/25/17 - Cycle 9 chemotherapy started.   · 10/12/17 - CT of the chest with contrast showed several tiny pulmonary nodules. One within the right lower lobe appears new from prior exams. Two adjacent foci of nodularity within the medial right lower lobe suggestive of minor infectious or inflammatory process. CT of the abdomen and pelvis with contrast which showed soft tissue nodules along the anterior abdominal  and pelvic walls unchanged from previous scan. Also a partially calcified mass within the left pelvis unchanged. No acute process identified within the abdomen or pelvis. Several left paracentral ventral hernias unchanged. No evidence of acute bowel obstruction.   · 10/16/17 - Order written for Levaquin 500 mg p.o. daily as the patient states that she has had a productive cough, fever and chills and with the results of the CT scan showing a possible infectious versus inflammatory process. Order also written to continue chemotherapy and magnesium replacement as previously prescribed.   · 10/23/17 - Cycle 10 chemotherapy started.   · 11/19/17 - Patient went to the Emergency Room at [facility] complaining of right lower extremity redness and fever for a day. Venous Doppler had no evidence of a DVT and patient was discharged home on Clindamycin.   · 11/21/17 -  of 17 (0-35).   · 12/4/17 - Cycle 11 chemotherapy started.   · 12/20/17 - PET scan with multiple hypermetabolic soft tissue nodules abutting the anterior abdominal wall, stable from previous examination. Peripherally calcified left lower quadrant mass near the ascending colon stable in size demonstrating no hypermetabolic uptake. Previously had maximum SUV of 7. Right medial basilar pulmonary nodules resolved.   · 12/22/17 - CT left lower extremity with soft tissue swelling with skin thickening present along the anterior and medial aspect of the leg, slightly more pronounced around the palpable marker seen within the upper medial aspect of the lower extremity.   · 12/26/17 - Elastic stockings prescribed for lower extremity edema.   · 1/8/18 - Patient hospitalized at [facility] between 1/8/18 and 1/11/18 with fever of up to 101 degrees and painful rash on the lower extremities of several weeks’ duration. She was found to be hypokalemic and MRI of the lower extremities revealed thickening and swelling. Chest x-ray had no acute cardiopulmonary abnormality. Skin biopsy was  performed by Surgery revealing stasis dermatitis and no evidence of malignancy. Infectious Disease consultation was obtained and IV antibiotics were stopped. Blood cultures had no growth.   · 1/22/18 - Patient asked to start wearing elastic stockings which she has not started yet. She was given a prescription for Dyazide 1 p.o. daily.   · 2/26/18 - Ordered chemo to resume again. Patient unaware that she was supposed to resume chemo after her last visit in January. Continue magnesium infusions on day 1, 8 and 15. Prescribed Levaquin 500 mg p.o. daily x10 days for productive cough x1 week. History of viral illness consistent with influenza.  of 18 (0-35). Chest x-ray with no acute process.    · 3/5/18 - Cycle 12 chemotherapy started.   · 3/26/18 - Options discussed with patient. Decision made to discontinue IV Gemzar and orders written to start on Niraparib (Zejula) 300 mg p.o. daily as maintenance therapy.   · 4/11/18 - Niraparib discontinued due to insurance denial. Orders written to start Carboplatin-AUC 5 IV day 1 cycling every 21 days. Orders written for Neulasta 6 mg subcutaneous kit day 1 with palliative treatment intent and expected duration of treatment three months.   · 4/12/18 - CT chest, abdomen and pelvis with contrast revealed numerous tiny centrilobular nodules in the lungs favored to reflect infectious or inflammatory process. Nodules ranged 1-3 mm in size and are new from prior studies with metastatic disease not entirely excluded. Stable small hiatal hernia. Interval progression of metastatic disease involving the subcutaneous tissues at the ventral abdominal wall. Multiple soft tissue density nodules previously shown to be hypermetabolic on PET scan. New small crescent of low attenuation material along the periphery of the spleen with similar finding at the dome of the liver. Findings are concerning for peritoneal metastases. Density of the dome of the liver remained unchanged from multiple  prior studies. Stable calcified mass in the left pelvic sidewall. Urinary bladder wall thickening.   · 4/23/18 - Cycle 1 chemotherapy with Carboplatin administered along with Neulasta injection.   · 4/26/18 - Neulasta discontinued due to insurance denial.   · 5/14/18 - Patient received cycle 2 Carboplatin.   · 6/5/18 - Cycle 3 day 1 chemotherapy with Carboplatin administered.   · 6/20/18 - CT chest, abdomen and pelvis at [facility] showed re-demonstration of soft tissue mass in the ventral wall hernia left of the midline as well as the dome of the liver, likely representing metastatic disease similar as compared to the prior study. Additional calcified lesions present in the upper left hemipelvis and along the anterior abdominal wall to the right of the midline also likely representing metastatic disease. No definite new lesions were identified. Moderate to large stool burden likely related to mild constipation was present. No suspicious lung nodules were identified. The previously-described ground glass nodules appeared to have resolved. There was a stable subpleural nodule in the right upper lobe measuring 3 mm present since 2014 without significant changes.   · 6/26/18 - Cycle 4 day 1 Carboplatin administered with addition of Neulasta for febrile neutropenia prophylaxis.   · 6/29/18 - Reviewed CT scans with patient. Orders written to discontinue Carboplatin and Neulasta and plan to start Niraparib 300 mg by mouth daily as maintenance therapy. Prescribed Amlodipine 2.5 mg p.o. daily for chemo-induced hypertension.   · 7/18/18 - Orders written to increase weekly Magnesium Sulfate to 3 g IV weekly due to continued hypomagnesemia.   · 8/1/18 - Patient reports she has not received Niraparib (Zejula) to date.   · 8/30/18 - Patient’s phone was not working so though Niraparib approved, the drug company had not been able to get ahold of her. Patient supplied with drug company number so that she can start taking  the pills.   · 9/6/18 -  of 20 (N).   · 9/18/18 - Urinalysis done for dysuria and back pain. Urine culture grew 20,000 to 50,000 colonies of urogenital ruy. Prescribed Bactrim DS one tablet twice daily for one week.   · September 2018 - Patient initiated on Zejula 300 mg p.o. daily.   · 10/1/18 - WBC 3.5, hemoglobin 12, platelet count 74,000. Niraparib (Zejula) dose held and then resumed when platelets above 100,000 at a dose of 200 mg daily.   · 10/15/18 - Patient received Niraparib (Zejula) at 200 mg p.o. daily with a platelet count of 198,000.   · 11/7/18 - WBC 6, hemoglobin 11.6, MCV 96.2, platelet count 137,000. Patient claims to have stopped taking Niraparib a few days prior because of cough. Asked to resume taking it. Ciprofloxacin 500 mg p.o. twice daily for one week for bronchitis.   · 12/3/18 - Magnesium Sulfate infusions changed to every two weeks, to send mag level before and give 3 grams. DC’d Magnesium Oxide and started Magnesium Plus Protein two pills twice daily.   · 1/4/19 - CT chest, abdomen and pelvis with new patchy nodular areas of airspace consolidation within the bilateral upper lobes, left greater than right, favored to be infectious or inflammatory. Interval enlargement of the peritoneal soft tissue masses within the pelvis compatible with progression of disease. Enlarging ventral hernia now containing multiple loops of small bowel and colon.   · 1/7/19 - Patient has not been coming in for weekly magnesium replacement as nursing has not been able to get ahold of her. Results of CT’s discussed with patient. Decision made to change her to IV chemotherapy with Carboplatin AUC-5 day 1 and pegylated liposomal Doxorubicin (Doxil) 30 mg/M2 IV day 1 to cycle every four weeks. Patient made aware of the limited choices of her treatment available.   · 1/31/19 - Echocardiogram showed LVEF 50-55% and otherwise normal echo Doppler study.   · 2/4/19 - New chemotherapy not initiated to date with  difficulty contacting patient for echocardiogram. Neulasta On-Pro 6 mg ordered with chemotherapy due to extensive prior exposure to chemotherapy, increasing risk of febrile neutropenia, history of neutropenic events on prior chemotherapy regimens.   · 2/15/19 - Patient started cycle 1 chemotherapy with Neulasta support.   · 3/6/19 -  of 28 (0-35).   · 3/15/19 - Cycle 2 Carboplatin with Liposomal Doxorubicin (Doxil) and Neulasta support initiated.     · 4/10/19 - Patient was requested to followup with Dr. Queen regarding hypotension and blood pressure medication dosing. Patient appears to be tolerating treatment well with cytopenias not requiring dose adjustments. No Doxil-related skin or mucus membrane toxicities or other significant toxicities to date.   · 4/12/19 - Cycle 3 chemotherapy given.   · 4/16/19 - CT chest, abdomen and pelvis with no evidence of active metastasis to the chest. Several tree-in-bud nodules within the periphery of the inferior right upper lobe likely infectious or inflammatory. Disease burden within abdomen and pelvis stable to slightly increased from prior CT. Specifically, a soft tissue mass along the right rectus muscle slightly increased in bulk. Multiple ventral hernias, some containing loops of bowel, with no evidence of acute bowel obstruction.   · 5/8/19 - Discussed CT results with patient. Overall stable disease and would like to continue same treatment. Dove soap and Hydrocortisone Cream p.r.n. prescribed for scattered rash on back.   · 5/10/19 - Cycle 4 Carboplatin and Liposomal Doxorubicin initiated.   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 It was ER positive, HER2/zuleima negative, PD-L1 negative. There was TOPO1 positivity and TUBB3 positivity. TMB was low (two mutations per megabyte MUTS/MB) and MSI was stable. There were 13 therapies considered with increased benefit. The 13 therapies with increased  benefit included Fulvestrant, Irinotecan, Letrozole, Topotecan, Anastrazole, Exemestane, Tamoxifen, all of which were referenced to [facility] as well as Abemaciclib, Everolimus, Gefitinib, Palbociclib, Ribociclib and Toremifene.     · 6/5/19 echocardiogram with preserved LV systolic function with EF around 60%.  · 6/7/19 cycle 5 chemotherapy with Neulasta support given.  Patient continued on mag oxide 400 mg p.o. twice daily and weekly IV 3 g mag sulfate infusions for persistent hypomagnesemia.  · 7/5/2019- cycle 6 chemotherapy with carboplatin and doxorubicin with Neulasta port initiated.  Ca125:  21 (0-35).  · 7/23/2019-CT chest abdomen and pelvis compared to CT from 4/16/2019 revealed multiple small pulmonary nodules unchanged from prior examination within the right upper and left lower lobes.  Complex ventral hernia similar to prior exam.  Soft tissue nodule along the right lateral margin of the right of the midline measuring 12 x 10 mm unchanged in size.  Irregular soft tissue nodular thickening along the margin of the most inferior aspect of the complex ventral hernia with thickness measuring up to 13 mm similar to prior examination.  Extensive calcified and soft tissue mass within the left lower quadrant decrease in size now measuring 2.6 x 2.3 cm previously 3.0 x 2.5 cm.  Partially calcified mass involving the right rectus sheath measuring 3.1 x 2.7 cm previously measured 3.3 x 2.9 cm.  Densely calcified mass measuring 2.1 x 1.6 cm unchanged previously measured 2 x 1.7 cm.  Right external iliac chain lymph node measuring 9 mm previously measured 5 mm.  · 8/2/2019 cycle 7 chemotherapy given.  · 8/30/2019-cycle 8 chemotherapy with carboplatin and doxorubicin with Neulasta support given.  · 9/27/2019 cycle 9 chemotherapy given.  · 10/1/19 - Echocardiogram showed Normal LV size and contractility EF of 60-65%. Normal RV size. Borderline left atrial enlargement. Aortic valve, mitral valve, tricuspid valve appears  structurally normal, no significant regurgitation seen.No pericardial effusion seen. Proximal aorta appears normal in size.  · 10/18/19 - Magnesium 1.5 (1.8-2.5).  IV magnesium replacement continued.  · 10/25/2019 cycle 10 chemotherapy given.  · 10/29/2019 patient had CT scan of the chest abdomen and pelvis-there is a new 2 mm nodule in the left lower lobe with a stable 2 mm nodule in the left lower lobe.  Follow-up in 6 months was recommended.  Stable multiple soft tissue density and calcified nodules within the ventral abdominal wall and left retroperitoneal fat consistent with nonviable metastatic disease.  These nodules have either decreased in size or stable.  · 11/22/2019 10 received cycle 11 of chemotherapy with Doxil and carboplatinum with Neulasta  · 12/8/2019 to 12/11/2019 patient was admitted to the hospital for neutropenic fever.  · 12/20/2019 patient received cycle 12 of chemotherapy with Doxil and carboplatinum with Neulasta  · 1/13/20: 2 D echo was normal with EF 56% to 60%  · 1/24/20-Patient received cycle 13 of combination chemotherapy with carboplatinum and Doxil  · 2/3/2020 patient had a  which was 25.4  · 2/21/2020-patient received cycle 14 of chemotherapy with carboplatinum and Doxil  · 3/6/2020 patient had CT scan of the chest, abdomen and pelvis this revealed a 3 mm nodule in the left lower lobe present on prior CT of the abdomen unchanged from July 2019.  Stable 3 mm right upper lobe nodule.  There is a lymph node in the AP window measuring 8 x 9 mm prominent left hilar lymph node measuring 12 mm previously was 7 x 7 mm no significant adenopathy was seen in the abdomen there is an area of irregular soft tissue in the left paramidline ventral hernia which is stable measuring 5.7 cm partially calcified mass in the right rectus measuring 3 x 2.5 cm which is also stable the prominent lymph nodes in the left mid hilum and mediastinum may be reactive or metastatic disease  · 4/17/2020-patient  had cycle 15 of single agent carboplatinum  · 5/26/2020 patient had CT scan of the chest, abdomen and pelvis showed 3 new lung nodules measuring 7 mm in the left upper lobe, 5 mm in the left lower lobe and 4 mm in the right lower lobe.  There were additional small lung nodules which were unchanged.  Mildly prominent mediastinal and bilateral hilar lymph nodes mildly increased in size.  Right hilar node 9 mm previously was 5 mm.  Carinal node 9 mm previously was 6 mm.  The nodule at the right side of the hernia sac has increased to 1.5 cm from 1.2 cm.  Also a nodule in the subcutaneous fat currently measures 2.4 was previously 2 cm.  · 5/15/2020-patient received cycle 16 of carboplatinum  · Since the last visit in the office patient patient developed dizzy spell and fell. For that reason she was taken to the emergency room at [place].    · 7/3/2020 she had brain MRI which showed an 8 mm focus of abnormality in the left aspect of the posterior fossa corresponding to a dural based enhancing lesion thought to represent a calcified meningioma vessels calcified dural based metastasis.  This lesion has a slight local mass-effect and a small amount of surrounding edema.  There is also a 4 to 5 mm rounded focus of abnormal nodular enhancement along the cortex of the left parietal lobe but no significant mass-effect.  This is nonspecific could represent neoplastic process, infectious/inflammatory process or granulomatous disease.  · Patient was seen by neurosurgery, follow-up brain MRI in 8 weeks has been recommended by Dr. Hartman  · 7/9/2020 patient was initiated on single agent topotecan cycle 1 day 1  · 7/16/2020 she received the 8 topotecan  · 8/6/2020 patient received cycle 1 day 15 of topotecan  · 9/2/2020 patient had brain MRI multiple hospital which showed interval increase in size of the metastasis in the left parietal lobe now measuring 9 x 7 mm.  Previously was 5 mm.  There has been interval increase in size of the  left superior cerebellar metastases now measuring 1.3 cm previously was 8 mm.  There was no new metastatic disease identified.  · 9/11/2020 patient received cycle 2-day 15 of single agent topotecan  · Was admitted to the hospital 10/1/2020 for supratherapeutic INR  · 10/13/2020 patient was seen by Dr. Delong she has started stereotactic brain radiation to be completed on 10/28/2020  · 11/6/2020 patient received cycle 3-day 1 of chemotherapy with topotecan  · 11/13/2020 patient received cycle 3-day 8 of topotecan  · 11/30/2020 patient had CT scan of the chest, abdomen and pelvis this showed new reticular nodular interstitial thickening within the right lung mostly in the right middle lobe and right lower lobe worrisome for lymphangitic spread of cancer.  Evidence of progression of metastatic disease in the chest, abdomen and pelvis the new tiny sclerotic foci within the spine worrisome for osseous metastatic disease.  · 12/17/2020 - Discontinued topotecan due to progressive disease with orders written to begin Alimta 500 mg per metered squared IV q. 21 days  · 12/30/2020 - Started cycle 1 day 1 Alimta   · 1/18/2021 patient had bone scan which showed chest metastatic disease.  Xgeva has been ordered  · 3/12/2021 patient received cycle 1 of combination chemotherapy with cisplatinum and  Gemzar  · 4/23/21: Patient had cycle 2 day with cis-platinum and Gemzar  · 4/30/2021: Patient received cycle 2-day 8 of chemotherapy with cis-platinum and Gemzar  · 5/6/2021 patient had a CT scan of the chest which shows evidence of progressive disease     2. Deep vein thrombosis of left upper extremity diagnosis established in October of 2013.  · 10/16/13 - Venous Doppler of left upper extremity.  Impression:  Deep vein thrombosis involving the subclavian, axillary and brachial veins on the left side, superficial vein thrombosis involving the left basilic vein.  · 10/16/13 - Order written for Lovenox 120 mg subcutaneously daily until INR  is in therapeutic range.  Order written for Coumadin 5 mg p.o. daily.  The patient is to follow PT and INR protocol.  · 5/20/16 - Venous Doppler bilateral lower extremities normal.  · 7/30/19 - Patient continued on Coumadin following the INR protocol.      3. Anemia diagnosis established in November 2013 and pernicious anemia diagnosis established March 2016.   · 11/15/13 - Hemoglobin is 7.8.  · 12/2/13 - Orders written to start Aranesp 200 mg subcutaneous every two weeks due to low hemoglobin secondary to chemo induced anemia.  Hemoglobin is 9.7.  Anemia workup is ordered.  · 12/03/13 - Ferritin 179.8 (N), folic acid 8.3 (N), vitamin B12 314, iron 104 (N), TIBC 298 (N), iron saturation 35 (N), Reticulocyte count 0.88 (N).  EPO level 124 (N).  Haptoglobin 22 (H).  · 10/22/14 - Hemoglobin 12.5, hematocrit 37.3, MCV 91.6.  · 10/23/14 - Anemia resolved.   · 3/8/16 - WBC 5.8, hemoglobin 11.7, MCV 90.3, platelet count 245,000. Vitamin B12 of 189 (180-914), ferritin 61 (), iron 91 (), TIBC 345 (228-428).   · 3/15/16 -  ().        · 3/22/16 - Intrinsic factor antibody positive, antiparietal antibody negative. Vitamin B12 at 1000 mcg IM weekly started, but the patient after receiving first dose on 3/22/16 did not come back for injections until 4/13/16.   · 4/13/16 - WBC 5.1, hemoglobin 12.5, MCV 87.9, platelet count 201,000. Patient given B12 injection and then continued at home.   · 5/10/16 - WBC 5.1, hemoglobin 12.5, platelet count 201,000.   · 6/14/16 - Monthly Vitamin B12 injections continued at home.   · 7/11/16 - WBC 5.48, hemoglobin 12.7, MCV 86.2, platelet count 200,000.   · 8/16/16 - Patient continued on monthly Vitamin B12 injections at home.   · 1/5/17 - WBC 2.6 with 38% neutrophils, 56% lymphocytes, 5% monocytes, hemoglobin 10.5, .3, platelet count 63,000. Patient continued on Vitamin B12 injections 1000 mcg IM monthly at home.   · 3/20/17 - Retic 0.41 (0.5-1.5), creatinine 1.0  (0.4-1.0). Iron 12 (), TIBC 270 (228-428), ferritin 259 (), haptoglobin 340 (), TSH 0.43 (0.34-5.6), folate 6.0 (5.9-24.8). Serum protein electrophoresis revealed decreased gammaglobulins. Serum EDU had no monoclonal gammopathy identified. Stool Hemoccult was negative.   · 6/8/17 - WBC 6.3, hemoglobin 8.3, MCV 92.4, platelet count 50,000. Procrit 40,000 units subq weekly added for chemotherapy-induced anemia. Patient continued on Vitamin B12 at 1000 mcg IM monthly at home.   · 7/5/17 - Iron 33 (), TIBC 328 (228-428), ferritin 211 (), TSH 2.46 (0.34-5.6).       · 7/31/17 - WBC 5.0, hemoglobin 11.2, MCV 89.7, platelet count 217,000. We will continue with the Procrit 40,000 units subq weekly for chemo-induced anemia when the hemoglobin falls below 10.0 and the patient is also to continue with the Vitamin B12 at 1000 mcg IM monthly at home. Creatinine 1.24 (0.5-0.99).    · 8/28/17 - WBC 9.6, hemoglobin 8.7, MCV 93.7, platelet count 133,000. Patient is to continue with the Procrit 40,000 units subq weekly and will receive that today. She is also to continue with the Vitamin B12 at 1000 mcg IM monthly at home.  · 10/16/17 - WBC 7.5, hemoglobin 9.6, MCV 98.4, platelet count 195,000. Patient is to continue with Procrit 40,000 units subq weekly and will receive Procrit today.   · 1/1/18 - Creatinine 1.3 (0.4-1.0).    · 2/19/18 - Procrit given for hemoglobin 9.9.   · 2/26/18 - Continue Procrit 40,000 units weekly p.r.n. hemoglobin <10. Continue Vitamin B12 at 1000 mcg IM monthly at home.   · 3/26/18 - Hemoglobin 8.3. Procrit dose increased to 60,000 units weekly. Iron 29 (), TIBC 306 (228-428), and iron sat 9% (15-50). Iron 29 (), TIBC 306 (228-428), iron saturation 9% (15-50).   · 3/27/18 - Order written to start Ferrous sulfate 325 mg p.o. b.i.d.    · 4/2/18 - Stool heme negative x3.   · 4/30/18 - Patient had not started Ferrous sulfate 325 mg p.o. b.i.d. and verbalized  understanding to do so and to notify the office if side effects are not tolerable.   · 5/24/18 - Patient was hospitalized at [facility] between 5/24/18 and 5/26/18 with dark tarry stool. INR was subtherapeutic. She was given IV Protonix and Protonix 40 mg daily. She underwent an EGD by David Rogers M.D. revealing small hiatal hernia, small submucosal nodule near the cardia, erosive gastritis. Pathology on gastric antrum and body biopsy revealed iron pill gastritis and no evidence of Helicobacter pylori. She then underwent a colonoscopy revealing diverticulosis, hemorrhoids and surgical changes from previous resection. It was recommended to consider outpatient M2A if hemoglobin continued to drop.   · 6/4/18 - Order written to discontinue iron pills and to use Injectafer 750 mg IV days 1 and 8 for low iron sats. Order written for Procrit 40,000 units subq weekly. Iron 65 (), TIBC 328 (228-428), iron saturation 20% (15-50), ferritin 123 ().   · 6/29/18 - Discussed patient’s intolerance of oral iron due to gastritis. Oral iron discontinued with plans for Injectafer should iron level drop in the future.   · 11/7/18 - Patient claims not to be taking Vitamin B12 injections at home. Asked to resume those.   · 12/3/18 - Patient reports she has not been taking her B12 injections for a couple of months. She needs some syringes and needles for that.   · 2/4/19 - Patient was uncertain if she is currently taking ferrous Sulfate or not. Patient with history of intolerance and will follow hemoglobin.   · 5/8/19 - Ferritin 64 ().  · 8/27/2019- iron 52 (), iron saturation 14% (15-50), TIBC 364 (228-420), and ferritin 74 ().  Injectafer 750 mg IV day 1 and 8 due to oral iron intolerance ordered.  · 8/30/2019- Injectafer 750 mg IV day 1 given.  Hemoglobin 10.8.  At the end of the infusion heart rate was 48 and the patient reported mild dizziness.  Patient was sent to the ED for evaluation with symptoms  resolving rapidly.  Chest x-ray and CT head were negative for acute findings.  · 9/6/2019-Injectafer 750 mg IV day 8 given without adverse effects.  Hemoglobin 9.8.   · 9/25/19 - Iron 85 (). Iron saturation 25 (15-50). TIBC 335 (228-428). Ferritin  694 ().  Hemoglobin 10.3.  · 10/25/2019 hemoglobin 9.7.  Patient started on Retacrit 40,000 units subcu weekly.  · 12/17/2020 hemoglobin 11.9, hematocrit 38.3     Past Medical History:   Diagnosis Date   • Anemia 2013   • Cervical disc disorder 2013    Herniated disc, pinched nerve   • Clotting disorder (CMS/HCC) 2013    Low platelets from chemo   • Colon polyp 2013   • Deep vein thrombosis (CMS/HCC) 2013   • Diverticulosis 2013   • GERD (gastroesophageal reflux disease) 2016   • HL (hearing loss) 2016    I need hearing aids   • Hyperlipidemia 2013    Need my cholesterol rechecked   • Hypertension    • Joint pain 2013    Shoulder pain, torn rotator cuff   • Low back pain 2013   • Neuromuscular disorder (CMS/HCC) 2015    Shingles, pinched nerves in my neck   • Osteopenia 2014   • Osteoporosis    • Ovarian cancer (CMS/HCC)     ovarian--  spread to lungs 10/2020   • Ovarian cancer on left (CMS/HCC) 1995   • Pneumonia 2010    Have had it several times   • Spinal stenosis 2013    Cervical   • Urinary tract infection 2013    Have had several utis       Past Surgical History:   Procedure Laterality Date   • BRONCHOSCOPY N/A 2/10/2021    Procedure: BRONCHOSCOPY with bronchioalveolar lavage;  Surgeon: Jerica Esparza MD;  Location: [facility] ENDOSCOPY;  Service: Pulmonary;  Laterality: N/A;  post op:right lobe pneumonia   • CATARACT EXTRACTION     • COLON SURGERY     • COLONOSCOPY  05/26/2018   • EXPLORATORY LAPAROTOMY     • HERNIA REPAIR     • HYSTERECTOMY     • OOPHORECTOMY           Current Outpatient Medications:   •  acetaminophen (TYLENOL) 500 MG tablet, Take 1,000 mg by mouth Every 6 (Six) Hours As Needed for Mild Pain ., Disp: , Rfl:   •  albuterol sulfate  (90  Base) MCG/ACT inhaler, Inhale 2 puffs Every 4 (Four) Hours As Needed for Wheezing., Disp: 18 g, Rfl: 0  •  atorvastatin (LIPITOR) 20 MG tablet, Take 20 mg by mouth every night at bedtime., Disp: , Rfl: 3  •  calcium carbonate (Calcium 600) 600 MG tablet, Take 1 tablet by mouth 2 (two) times a day., Disp: 60 tablet, Rfl: 11  •  cetirizine (zyrTEC) 10 MG tablet, Take 1 tablet by mouth Every Night., Disp: 30 tablet, Rfl: 0  •  cyanocobalamin 1000 MCG/ML injection, Inject 1,000 mcg into the appropriate muscle as directed by prescriber Every 28 (Twenty-Eight) Days., Disp: , Rfl:   •  dexamethasone (DECADRON) 4 MG tablet, Take 2 tablets in the morning daily on days 2, 3 & 4.  Take with food., Disp: 6 tablet, Rfl: 5  •  famotidine (PEPCID) 40 MG tablet, TAKE 1 TABLET BY MOUTH EVERY MORNING BEFORE BREAKFAST, Disp: 90 tablet, Rfl: 1  •  folic acid (FOLVITE) 1 MG tablet, Take 1,000 mcg by mouth Daily., Disp: , Rfl:   •  K Phos Irwin-Sod Phos Di & Mono (Virt-Phos 250 Neutral) 155-852-130 MG tablet, TAKE 1 TABLET BY MOUTH TWICE DAILY FOR 2 DAYS, Disp: , Rfl:   •  MAGnesium-Oxide 400 (241.3 Mg) MG tablet tablet, Take 400 mg by mouth 2 (Two) Times a Day., Disp: , Rfl:   •  montelukast (Singulair) 10 MG tablet, Take 1 tablet by mouth Every Night., Disp: 90 tablet, Rfl: 1  •  ondansetron (ZOFRAN) 8 MG tablet, Take 1 tablet by mouth 3 (Three) Times a Day As Needed for Nausea or Vomiting., Disp: 30 tablet, Rfl: 5  •  oxyCODONE (ROXICODONE) 10 MG tablet, Take 1 tablet by mouth Every 4 (Four) Hours As Needed for Moderate Pain ., Disp: 180 tablet, Rfl: 0  •  oxyCODONE (ROXICODONE) 10 MG tablet, Take 1 tablet by mouth Every 4 (Four) Hours As Needed for Moderate Pain ., Disp: 180 tablet, Rfl: 0  •  oxyCODONE (ROXICODONE) 10 MG tablet, Take 1 tablet by mouth Every 4 (Four) Hours As Needed for Moderate Pain ., Disp: 180 tablet, Rfl: 0  •  phosphorus (K PHOS NEUTRAL) 155-852-130 MG tablet, Take 1 tablet by mouth 2 (Two) Times a Day. Take 1  tablet every 12 hours for 2 doses and then stop., Disp: , Rfl:   •  potassium chloride (K-DUR,KLOR-CON) 20 MEQ CR tablet, Take 2 tablets by mouth Daily., Disp: 60 tablet, Rfl: 3  •  pregabalin (LYRICA) 100 MG capsule, Take 1 capsule by mouth 3 (Three) Times a Day., Disp: 90 capsule, Rfl: 2  •  sertraline (ZOLOFT) 50 MG tablet, Take 50 mg by mouth Every Night., Disp: , Rfl:   •  temazepam (RESTORIL) 30 MG capsule, TAKE 1 CAPSULE BY MOUTH AT NIGHT AS NEEDED FOR SLEEP, Disp: 30 capsule, Rfl: 2  •  triamterene-hydrochlorothiazide (DYAZIDE) 37.5-25 MG per capsule, TAKE 1 CAPSULE BY MOUTH EVERY DAY, Disp: 90 capsule, Rfl: 1  •  warfarin (COUMADIN) 5 MG tablet, Take 1 tablet by mouth Daily. At 1700 as directed, Disp: 30 tablet, Rfl: 0  •  guaiFENesin-codeine (Virtussin A/C) 100-10 MG/5ML liquid, Take 5 mL by mouth Every 6 (Six) Hours As Needed for Cough., Disp: 600 mL, Rfl: 0    Allergies   Allergen Reactions   • Morphine Nausea Only and Hives   • Penicillin V Potassium Rash   • Sulfa Antibiotics Rash   • Levaquin [Levofloxacin] Nausea Only and Dizziness     When taken with Coumadin   • Amoxicillin Rash       Family History   Problem Relation Age of Onset   • Hypertension Mother    • Cancer Father    • Hypertension Father    • Hypertension Sister    • Hypertension Brother    • Stroke Brother        Cancer-related family history includes Cancer in her father.    Social History     Tobacco Use   • Smoking status: Never Smoker   • Smokeless tobacco: Never Used   Vaping Use   • Vaping Use: Never used   Substance Use Topics   • Alcohol use: No     Comment: caffeine 32-64oz qd   • Drug use: No     HPI, ROS and PFSH have been reviewed and confirmed on 5/7/2021.     SUBJECTIVE:    Patient is here today for follow-up.  She is accompanied today by her daughter for this appointment    REVIEW OF SYSTEMS:    Review of Systems   Constitutional: Negative for chills and fever.   HENT: Negative for ear pain, mouth sores, nosebleeds and  "sore throat.    Eyes: Negative for photophobia and visual disturbance.   Respiratory: Negative for wheezing and stridor.    Cardiovascular: Negative for chest pain and palpitations.   Gastrointestinal: Negative for abdominal pain, diarrhea, nausea and vomiting.   Endocrine: Negative for cold intolerance and heat intolerance.   Genitourinary: Negative for dysuria and hematuria.   Musculoskeletal: Negative for joint swelling and neck stiffness.   Skin: Negative for color change and rash.   Neurological: Negative for seizures and syncope.   Hematological: Negative for adenopathy.        No obvious bleeding   Psychiatric/Behavioral: Negative for agitation, confusion and hallucinations.     OBJECTIVE:  Vitals:    05/07/21 0829   BP: 111/72   Pulse: 77   Resp: 18   Temp: 97.1 °F (36.2 °C)   Weight: 81.9 kg (180 lb 9.6 oz)   Height: 165.1 cm (65\")   PainSc: 0-No pain     Temp 97.5   Pulse 64  RR 18  /92  Height 165.1 cm   Weight 81.6 kg   BSA 1.89  BMI 30    ECOG  (1) Restricted in physically strenuous activity, ambulatory and able to do work of light nature    Physical Exam   Constitutional: She is oriented to person, place, and time. No distress.   Obese    HENT:   Head: Normocephalic and atraumatic.   Mouth/Throat: Mucous membranes are moist.   Eyes: Conjunctivae are normal. Right eye exhibits no discharge. Left eye exhibits no discharge. No scleral icterus.   Neck: No thyromegaly present.   Cardiovascular: Normal rate, regular rhythm and normal heart sounds. Exam reveals no gallop and no friction rub.   Pulmonary/Chest: Effort normal. No stridor. No respiratory distress. She has no wheezes.   Left chest wall port    Abdominal: Soft. Bowel sounds are normal. She exhibits no mass. There is no abdominal tenderness. There is no rebound and no guarding.   Musculoskeletal: Normal range of motion. No tenderness.   Lymphadenopathy:     She has no cervical adenopathy.   Neurological: She is alert and oriented to person, " place, and time. She exhibits normal muscle tone.   Skin: Skin is warm and dry. She is not diaphoretic. No erythema.   Psychiatric: Her behavior is normal. Mood normal.   Nursing note and vitals reviewed.    I have reexamined the patient and the results are consistent with the previously documented exam. Cindy Ball MD     RECENT LABS    WBC   Date Value Ref Range Status   05/07/2021 1.57 (L) 3.40 - 10.80 10*3/mm3 Final   07/05/2020 4.04 (L) 4.5 - 11.0 10*3/uL Final     RBC   Date Value Ref Range Status   05/07/2021 3.05 (L) 3.77 - 5.28 10*6/mm3 Final   07/05/2020 3.68 (L) 4.0 - 5.2 10*6/uL Final     Hemoglobin   Date Value Ref Range Status   05/07/2021 8.8 (L) 12.0 - 15.9 g/dL Final   07/05/2020 11.5 (L) 12.0 - 16.0 g/dL Final     Hematocrit   Date Value Ref Range Status   05/07/2021 29.1 (L) 34.0 - 46.6 % Final   07/05/2020 35.3 (L) 36.0 - 46.0 % Final     MCV   Date Value Ref Range Status   05/07/2021 95.4 79.0 - 97.0 fL Final   07/05/2020 95.9 80.0 - 100.0 fL Final     MCH   Date Value Ref Range Status   05/07/2021 28.9 26.6 - 33.0 pg Final   07/05/2020 31.3 26.0 - 34.0 pg Final     MCHC   Date Value Ref Range Status   05/07/2021 30.2 (L) 31.5 - 35.7 g/dL Final   07/05/2020 32.6 31.0 - 37.0 g/dL Final     RDW   Date Value Ref Range Status   05/07/2021 17.1 (H) 12.3 - 15.4 % Final   07/05/2020 15.9 12.0 - 16.8 % Final     RDW-SD   Date Value Ref Range Status   05/07/2021 56.0 (H) 37.0 - 54.0 fl Final     MPV   Date Value Ref Range Status   05/07/2021 10.8 6.0 - 12.0 fL Final   07/05/2020 10.2 6.7 - 10.8 fL Final     Platelets   Date Value Ref Range Status   05/07/2021 44 (C) 140 - 450 10*3/mm3 Final   07/05/2020 158 140 - 440 10*3/uL Final     Neutrophil Rel %   Date Value Ref Range Status   07/05/2020 54.0 45 - 80 % Final     Neutrophil %   Date Value Ref Range Status   05/07/2021 41.5 (L) 42.7 - 76.0 % Final     Lymphocyte Rel %   Date Value Ref Range Status   07/05/2020 30.4 15 - 50 % Final      Lymphocyte %   Date Value Ref Range Status   05/07/2021 54.1 (H) 19.6 - 45.3 % Final     Monocyte Rel %   Date Value Ref Range Status   07/05/2020 12.4 0 - 15 % Final     Monocyte %   Date Value Ref Range Status   05/07/2021 3.8 (L) 5.0 - 12.0 % Final     Eosinophil %   Date Value Ref Range Status   05/07/2021 0.6 0.3 - 6.2 % Final   07/05/2020 3.0 0 - 7 % Final     Basophil Rel %   Date Value Ref Range Status   07/05/2020 0.2 0 - 2 % Final     Basophil %   Date Value Ref Range Status   05/07/2021 0.0 0.0 - 1.5 % Final     Immature Grans %   Date Value Ref Range Status   07/05/2020 0.0 0 % Final     Neutrophils Absolute   Date Value Ref Range Status   07/05/2020 2.18 2.0 - 8.8 10*3/uL Final     Neutrophils, Absolute   Date Value Ref Range Status   05/07/2021 0.65 (L) 1.70 - 7.00 10*3/mm3 Final     Lymphocytes Absolute   Date Value Ref Range Status   07/05/2020 1.23 0.7 - 5.5 10*3/uL Final     Lymphocytes, Absolute   Date Value Ref Range Status   05/07/2021 0.85 0.70 - 3.10 10*3/mm3 Final     Monocytes Absolute   Date Value Ref Range Status   07/05/2020 0.50 0.0 - 1.7 10*3/uL Final     Monocytes, Absolute   Date Value Ref Range Status   05/07/2021 0.06 (L) 0.10 - 0.90 10*3/mm3 Final     Eosinophils Absolute   Date Value Ref Range Status   07/05/2020 0.12 0.0 - 0.8 10*3/uL Final     Eosinophils, Absolute   Date Value Ref Range Status   05/07/2021 0.01 0.00 - 0.40 10*3/mm3 Final     Basophils Absolute   Date Value Ref Range Status   07/05/2020 0.01 0.0 - 0.2 10*3/uL Final     Basophils, Absolute   Date Value Ref Range Status   05/07/2021 0.00 0.00 - 0.20 10*3/mm3 Final     Immature Grans, Absolute   Date Value Ref Range Status   07/05/2020 0.00 <1 10*3/uL Final     nRBC   Date Value Ref Range Status   03/18/2021 0.0 0.0 - 0.2 /100 WBC Final       Lab Results   Component Value Date    GLUCOSE 96 04/30/2021    BUN 24 (H) 04/30/2021    CREATININE 0.94 04/30/2021    EGFRIFNONA 60 (L) 04/30/2021    EGFRIFAFRI >60  06/07/2019    BCR 25.5 (H) 04/30/2021    K 3.7 04/30/2021    CO2 28.0 04/30/2021    CALCIUM 8.5 (L) 04/30/2021    ALBUMIN 3.60 04/30/2021    LABIL2 1.3 07/03/2020    AST 17 04/30/2021    ALT 14 04/30/2021     ASSESSMENT:    1. 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  She was on carboplatinum, Doxil.  Patient had had carboplatinum Taxol, carbo Taxotere, Gemzar single agent, niraparib and was on Doxil and carboplatin.  Doxil was discontinued due to approaching of lifetime dosing.  Progressed on single agent topotecan and Alimta.  Refer to paradigm testing for future options at time of progression.  Patient has had progression of disease and therefore platinum was discontinued. On exam today I felt a right lower abdominal mass which is new.  She has CT scan of the chest, abdomen and pelvis on 3/1/2021 this showed moderate size pericardial effusion, scattered pulmonary nodules with septal thickening worse in the right lung compared to the left.  Pathologically enlarged hilar and mediastinal lymph nodes were suspicious for metastatic disease.  CT of the abdomen and pelvis showed increase in size of multiple soft tissue masses involving the anterior abdominal wall likely related to worsening of metastatic disease.  Increase in size of multiple sclerotic lesions within the lumbar spine and pelvis.  A 2D echocardiogram which did not reveal any significant pericardial effusion on 3/4/2021.  2. Patient has a subcutaneous nodule noted on the anterior abdominal wall consistent with metastatic disease currently measures 3.5 cm.  The anterior abdominal wall nodule has not significantly changed.  3. She has developed progressive cough for that reason CTX scan of the chest was obtained on 5/6/2021 which shows clear evidence of progressive disease.  Patient is not hypoxic.  Her sats on room air today was 98%.  For that reason we will discontinue Gemzar cis-platinum and place  patient on oral etoposide single agent.  Reviewed the side effects include but not limited to nausea vomiting bone marrow suppression and the need for possible blood transfusions, risk of neutropenia which may result in infections and hospitalization.  Would also like to perform dose reduction since patient has been pretty heavily treated.  4. Patient has bone metastases therefore I recommended Xgeva 120 mg subcu monthly.  Reviewed the side effects, benefits in detail with patient.  She would also be initiated on Os-Johnny D 600 mg twice a day.  She will schedule with her dentist prior to initiating Xgeva.  5. Progressive brain metastasis: Status post stereotactic brain radiation under the care of Dr. Delong and recent MRI of the brain on 12/15/2020 shows probable progression.  Recent brain MRI did not show any obvious signs of progression.  6. B12 deficiency on parenteral B12 injections  7. History of DVT on anticoagulation therapy with warfarin:   8. Pancytopenia: Due to chemotherapy: On Procrit  9. Hypomagnesemia: Continue to monitor levels and infuse as needed  10. Iron deficiency anemia: Monitoring  11. Post hospital visit  12. Assessment has been reviewed and updated as documented above    Discussion:    Reviewed her CT scans with patient.  She has progressive disease.  We will discontinue Gemzar cis-platinum and begin single agent etoposide.  Reviewed the side effects in detail with patient to include but not limited to:  Chemotherapy side effects include, but not limited to, nausea, vomiting, bone marrow suppression, which can result in blood, platelet transfusion. There is also risk of permanent bone marrow destruction, which can cause myelodysplastic syndrome or leukemia years down the line. There is risk of infection which can result in hospitalization and even death. There is also risk of fatigue, asthenia, alopecia which could become permanent. Chemo will help to reduce risk of relapse of cancer, but does not  eliminate risk completely.            PLAN:    1. Continue supportive care  2. Single agent oral etoposide  3. Schedule monthly port flushes  4. Discontinue Gemzar cis-platinum  5. Obtain CT scan of the abdomen and pelvis with contrast in about 2 weeks  6. Follow-up in 3 weeks  7. Given information on oral etoposide  8. Draw a CA-125 today  9. Continue Xgeva 120 mg subcu monthly  10. Continue calcium carbonate with Vitamin D 600 mg twice a day  11. Continue folic acid 1 mg p.o. daily -reviewed needs to stay on drug to prevent side effects from Alimta   12. Continue B12 injections 1000 mcg IM monthly, next due 1/14/2021  13. Status post IV Injectafer  14. Continue magnesium oxide 400 mg three times a day and weekly IV replacement as needed.   15. Continue Retacrit 40,000 units subcu weekly for hemoglobin less than 10, for chemotherapy-induced anemia  16. Continue supportive medications  17. MRI brain reviewed  18. All questions answered     I have reviewed labs results, imaging, vitals, and medications with the patient today.   Lab Results   Component Value Date    WBC 1.57 (L) 05/07/2021    HGB 8.8 (L) 05/07/2021    HCT 29.1 (L) 05/07/2021    MCV 95.4 05/07/2021    PLT 44 (C) 05/07/2021     Patient verbalized understanding and is in agreement of the above plans.        I spent 40 total minutes, face-to-face, caring for Tahira today.  90% of this time involved counseling and/or coordination of care as documented within this note.

## 2021-05-07 NOTE — PROGRESS NOTES
Patient had port flushed two weeks ago. Port flush not done today. Plan do next port flush with next MD visit or as needed.

## 2021-05-10 NOTE — PROGRESS NOTES
Drug: etoposide  Strength: 50mg  Directions: take 2 (100mg) by mouth for 21 days then off 7 days  QTY: 42  RF:3    Released to pharmacy: awaiting signature    Completed independent double check on medication order/RX.

## 2021-05-14 NOTE — PROGRESS NOTES
MTM Note: Oral Etoposide       Drumg capsules  Strength: Alternante 50mg PO every 48 hours with 100mg PO every 48 hours  Directions: Take 50mg PO every 48 hours alternate with 100mg PO every 48 hours. Total days 21 then off 7 days  QTY: 31  RF:2    Released to pharmacy: awaiting signature    Completed independent double check on medication order/RX.

## 2021-05-17 NOTE — PROGRESS NOTES
MTM Note: Etoposide    I left a message with call back to [phone] to schedule oral oncology patient education.

## 2021-05-20 NOTE — PROGRESS NOTES
MTM Note    Received message that she needed to cancel appointments today.  Called and left VM to reschedule. Call back [phone].

## 2021-05-21 NOTE — PROGRESS NOTES
INR 1.5. Pt states that she missed yesterday dose. Advised her to continue the 5mg daily and check next Wednesday when here for chemo teaching. She v/u

## 2021-05-21 NOTE — PROGRESS NOTES
Pt. Here for labs and possible procrit.   Pt's HGB 11.0 today   No procrit needed per treatment parameters

## 2021-05-26 NOTE — PROGRESS NOTES
Hematology/Oncology Outpatient Follow Up    PATIENT NAME:Tahira Zimmerman  :1955  MRN: [id number]  PRIMARY CARE PHYSICIAN: Noemy Queen MD  REFERRING PHYSICIAN: Noemy Queen MD    Chief Complaint   Patient presents with   • Follow-up     Malignant neoplasm of right ovary      HISTORY OF PRESENT ILLNESS:     1. Recurrent ovarian cancer diagnosis established in 2013.  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  The patient was treated with surgery and then postoperatively with adjuvant chemotherapy consisting of carboplatin and Taxol.  Six months later, she had recurrence of disease intra-abdominally between the rectum and vagina, which was treated with intraabdominal peritoneal chemotherapy.  She had been free of disease since that time.  She reported feeling a mass in the left side of her abdomen approximately six months ago.  This gradually grew and in early 2013 she had her daughter feel the mass.  The daughter works for Dr. David Rogers and the patient at that time also complained of intermittent rectal bleeding.  Consultation with Dr. David Rogers was obtained.  The patient had a CT scan of the abdomen and pelvis performed on 13 revealing left lower quadrant mass measuring 9.7 x 9.3 x 6 cm demonstrating areas of dense calcification, soft tissue density and cystic density within it.  Stromal tumor is felt to be most likely a possibly fibroma.  It is generated with necrosis and calcification.  Possible palpable mass in the rectus muscle is seen with calcification and deformity of the rectus muscle and just below this a second mass intra-abdominally was seen measuring 2 cm in the greatest diameter suspicious for second intraabdominal tumor.  The mass separate from the largest mass measuring 2.6 cm in the dependent portion of pelvis is seen on the right of the midline.  No  retroperitoneal lymphadenopathy was seen.  No free air, free fluid or other abnormality was present.  The patient was scheduled for an outpatient colonoscopy, but had syncopal episode while sitting in the toilet the night before and was brought to the emergency room early in the morning by her daughter and son-in-law.  The patient had apparently stopped breathing for a few seconds and did not have a pulse, but started breathing before CPR could be initiated.  In the emergency room, the patient had an EKG revealing sinus rhythm nonspecific T-wave flattening; metabolic panel revealed a glucose of 148, creatinine of 1.3, CBC was normal.  She was treated with intravenous fluid.  The patient’s case was then discussed with Dr. Anabell Monique and patient was subsequently admitted to [facility].  The patient underwent a colonoscopy by Dr. David Rogers on 06/27/13 revealing sigmoid diverticulosis and grade II internal and external hemorrhoids, but no mass or colitis was seen.  CT-guided biopsy of the mass was performed on 06/27/13 as well revealing metastatic papillary adenocarcinoma with numerous psammoma bodies.  Pathology comment stated prior records were not available; however, with history of ovarian cancer the current biopsy would be consistent with metastases from that malignancy.  Oncology consult was obtained and I initially saw the patient on 06/27/2013 where the patient had claimed to have little bit of pain in the area of disease.  She reported being frequently constipated, denies any weight loss or fevers.  She did report having some night sweats, but thought that was because of her menopause.  On 06/27/13 metastatic workup including labs and CT scans were initiated.  CT scan of the chest on 06/27/13 revealed no acute disease in the chest.  A 1.4 cm size focal area of decreased density was noted in the lateral aspect of the right lobe of the liver consistent with a benign cyst or hemangioma.   There was a very small hiatal hernia measuring 2.2 cm in diameter.  A CT scan of the head performed 06/27/13 revealed no acute intracranial abnormality noted.  CA-125 was 40 units/ml (0-35), CA 19-9 was 11.8 units/ml (0-35), CEA level was 0.8 ng/ml (0-3), TSH level was 1.09 IU/ml (0.34-5.6).  The patient was in workup for the syncopal episode, a carotid Doppler was performed on June 28 revealing an essentially normal study showing a reduction in diameter from 0-15% bilaterally.  A Holter monitor was completed on 06/27/13, which was unremarkable except for a few premature atrial contractions.  The patient was felt stable and subsequently was discharged home on 06/28/13.  Post discharge, the patient was seen at [facility] on 07/05/13 by Dr. Kathryn Thrasher who discussed treatment options with the patient including surgery.  The patient is in the office today 07/16/13 in follow up post hospitalization.  She is reporting that surgery is planned for July 30th of this month at .  · 7/30/13 to 8/3/13 - The patient was in [facility].  The patient was admitted for surgery for her recurrent ovarian cancer.  The patient had exploratory laparotomy, omentectomy, bowel resection, liver biopsy and appendectomy.  Pathology revealed of the omentum metastatic serous carcinoma of the transverse colon, segmental resection showed metastatic serous carcinoma involving mesenteric fat.  The liver wedge resection showed fibrosclerotic thickening of the hepatic capsule.  Benign liver parenchyma.  No evidence of metastatic tumor.  Appendix showed fibrous obliteration of the lumen.  Negative for metastatic carcinoma.  Rectum and sigmoid colon segmental resection showed metastatic serous carcinoma invading the colorectal mucosa uninvolved by tumor.  Vaginal mass showed metastatic serous carcinoma.  Margins are positive for tumor.  · 9/24/13 - Chemotherapy orders were written for patient to start  carboplatin 550 mg IV day one and Taxol 330 mg IV day one to be cycled every three weeks.  · 10/10/13 - The patient started cycle #1 of chemotherapy consisting of Carboplatin and Taxol.  · 09/25/13 -  is 10.6 normal.  · 10/01/13 - CT of the chest, abdomen and pelvis revealed new reticular nodular occupancy in the anterior segment of the right upper lobe, could reflect an inflammation or infectious etiology or could reflect unusual persistence of metastatic tumor.  New liver lesions, the smaller one appears to be implant on the surface of the liver, the larger may also represent an implant including the intrahepatic.  Several small mesenteric nodes seen throughout the abdomen and pelvis.  · 10/02/13 - Port placed by Dr. Sen.  · 10/24/13 - Orders written to start Neupogen daily and if more than three Neupogen are needed to give Neulasta after next chemo.  ANC is 0.15.  · 10/31/14 - Patient given cycle 2 of chemotherapy with Taxol and Carboplatin.  · 11/21/13 - The patient started cycle 3 of chemotherapy consisting of Carboplatin and Taxol.  · 11/26/13 - CT scan of chest, abdomen and pelvis revealed no evidence of active disease in the chest.  Reticulonodular opacity has resolved.  Metastatic lesion impressing upon the hepatic dome similar to prior exam.  No evidence of a change.  No evidence of new disease.  Postsurgical changes in the pelvis and throughout the retroperitoneum in the large bowel.  Finding containing anterior abdominal wall hernia containing fat tissue and enhancing vessels, 3 cm in diameter.  · 12/2/13 - Orders written to start Neupogen per protocol as well as Aranesp due to low hemoglobin and ANC.  ANC is 0.14.  Hemoglobin is 9.7.  · 12/11/13 - Orders written to hold chemotherapy until next week and decrease dose by 20% due to low platelet count.  Platelet count is 85,000.  · 12/16/13 - The patient received cycle 4 of Carboplatin and Taxol at a 20% dose reduction so Taxol dose is 260 mg and  Carboplatin is 440 mg.  · 12/16/13 - CA-125 12.6 (N).  · 12/19/13 - PET/CT scan.  Impression:  1. There is no evidence of hypermetabolism in the liver.  Specifically of the dominant lesion in or adjacent to the right hepatic dome that was seen and described on the recent CT study of 11/26/2013.  It is photopenic relative to the surrounding liver.  2.  There is no evidence of hypermetabolic soft issue mass lesion or free fluid in the abdomen or pelvis.  3.  The tonsillar tissues are slightly enlarged and have moderate activity level.  This maybe physiologic.  Correlation with oral cavity, examination is recommended.  4.  Mild amount of activity in the right level II jugular lymph chain without focally enlarged lymph nodes is of questionable significance.  · 1/6/13 - The patient received cycle 5 of chemotherapy with Taxol and Carboplatin.  · 1/27/14 - The patient started on cycle 6 of Taxol and Carboplatin.  · 2/18/14 - CT of the abdomen and pelvis with contrast; stable lesions impressing upon the hepatic dome, unchanged compared to the prior exam.  Multiple anterior abdominal wall hernias re-demonstrated.  Those above the umbilicus contain omental fat.  Below and to the left of the umbilicus as anterior abdominal hernia containing omental fat in nondilated small bowel which is larger than seen previously.  · 2/20/14 - The patient received cycle 7 of chemotherapy with Taxol and Carboplatin.   · 3/11/14 - Order written to discontinue chemotherapy due to patient has completed 7 cycles of chemotherapy and CT scans suggest possible remission.  · 3/11/14 -  11.0 (N).  · 06/05/14 - CT scan of the chest abdomen and pelvis with contrast: There are multiple anterior abdominal wall hernias.  There are 2 larger anterior abdominal wall hernias at the level of the transverse colon, which contain omental fat.  There are at least 2 small anterior abdominal wall hernias that contain omental fat.  There is a moderate sized  anterior abdominal wall hernia at the level of the umbilicus, eccentric to the left, which contain non-dilated bowel.  Interval decrease in the size of two deposit impressing on the liver.  The third tiny deposit has been stable.  · 8/19/14 -  10.4 (N).  · 12/19/14 -   10.0 (N)  · 1/7/15 - CT chest, abdomen and pelvis with stable appearance of the chest with several micronodules. Small hiatal hernia, slightly larger than previous exam, increased from 1.5 to 2.5 cm in diameter. Bochdalek hernia unchanged. Multiple hepatic lesions. The two dominant lesions at the right hepatic dome had decreased in size from previous. The remaining tiny nodules measured 3-5 mm in diameter and were unchanged. There was no evidence of new intra-abdominal or pelvic mass or free fluid. There were multiple anterior abdominal wall hernias which had increased in size.   · 7/27/15 - Comprehensive metabolic panel with no significant abnormalities. CA-125 of 21 (0-35).   · 10/26/15 - WBC 6, hemoglobin 13, platelet count 204,000. Heart rate 47. CA-125 of 20 (0-35).    · 2/18/16 - CT chest with contrast with stable micronodular density seen in the lungs without significant change from prior exam. CT abdomen and pelvis with regression of liver lesions with no new evidence of metastasis. Multiple ventral hernias again noted without significant change from prior exam. Creatinine 0.9 (0.6-1.3).    · 2/25/16 - Patient hospitalized at [facility] between 2/25/16 and 2/27/16 with pyelonephritis secondary to E-coli. She was given two days of IV Rocephin and then placed on oral Levaquin. She was asked to hold her Coumadin, but then had nausea and vomiting because of Levaquin and stopped the Levaquin also. Her CA-125 was 32 (0-35) and CEA 1.3 (0-5). Her urine grew >100,000 E-coli. CT of the abdomen and pelvis was done which revealed 4.1 x 1.8 cm sized mild ovoid area of decreased density in the liver parenchyma along the anteromedial  aspect of the dome of the right lobe of the liver, unchanged significantly since the previous study of 2/18/16. There was suspicion of a very small pericardial effusion. There was suspicion of a small hiatal hernia. There were several hernias in the anterior abdominal wall. A 3.5 x 3.1 cm incised well-circumscribed abnormal density in the left retroperitoneal fatty soft tissue at the level of the left iliac crest was suggestive of tumor recurrence. There was an abnormal density in the left retroperitoneal soft tissues in the left flank that partially surrounded the left kidney and extended downward to the left pelvic area. Diverticulosis of the distal descending colon and sigmoid colon were seen.     · 3/8/16 - Patient prescribed Bactrim DS 1 p.o. b.i.d. for 10 days for pyelonephritis, which was partially treated with Rocephin and Levaquin. Urine with 40,000 E-coli treated with Macrobid for 7 days.  of 20 (0-35).    · 3/31/16 - PET scan with area of low density anteriorly in the right lobe of the liver with no significant radiopharmaceutical uptake to suggest malignancy. Multiple round soft tissue density masses showing increased radiopharmaceutical activity and multiple anterior abdominal wall hernias.   · 5/10/16 - Patient complains of venous enlargement of the left chest wall around the port. Has not been seen by  yet, but has an appointment on 5/23/16. Complained of a cough.   · 5/12/16 - Infusaport contrast study with no evidence of fibrin sheath. Chest x-ray with mild cardiac prominence.   · 5/23/16 - Patient seen in consultation by Sheng Bowman M.D. at [facility] and a chemotherapy trial recommended.   · 6/1/16 -   of 27.1 (0-35).    · 6/14/16 - WBC 5.71, hemoglobin 12.4, platelet count 188,000. Patient is scheduled for surgery at  on 6/16/16 after further discussion with  physicians. Discussed adjuvant chemotherapy.   · 6/16/16 - Excisional biopsy of abdominal wall mass  performed by Sheng Bowman M.D. at [facility] with pathology revealing metastatic high-grade papillary serous carcinoma.   · 7/7/16 - Received a call from the patient that Tramadol was not working and that she was given Norco #24 after her biopsy at  which did help her pain. Patient prescribed Norco 5/325 one p.o. q. 4-6 hours p.r.n. #60 with no refills. Echocardiogram with left ventricular inferior wall hypokinesis with ejection fraction of 50-55%.   · 7/8/16 - Patient seen in consultation by Sheng Bowman M.D. in consultation at  for metastatic high-grade papillary serous carcinoma diagnosed by excisional biopsy on 6/16/16 with carcinomatosis and patient not a surgical resection candidate. Genetic sequencing and susceptibility testing of tumor was ordered. Biopsy was done of anterior abdominal wall.   · 7/7/16 - Echocardiogram with left atrial enlargement. Mild mitral regurgitation. Left ventricular proximal inferior wall hypokinesis with ejection fraction of 50-55%.   · 7/11/16 - While waiting for genomics results, in order not to delay treatment further, order written for Taxotere 60 mg/M2 IV over one hour followed by Carboplatin AUC 5 over one hour, cycles to be repeated every three weeks.  Comprehensive metabolic panel normal.  26 (0-35).    · 725/16 - Pain contract signed for use of Norco.   · 8/5/16 - Patient stated that she experienced a red rash and was itchy post taking Norco. Norco discontinued and Tramadol refilled.   · 8/16/16 - Patient started cycle 1 chemotherapy. WBC 7.1, hemoglobin 11.2, MCV 88.7, platelet count 94,000. Patient complained of nausea for a week post chemo. Already getting Emend, Aloxi and Decadron. Added Omeprazole 40 mg daily orally.   · 8/17/16 - Urine culture with >100,000 E-coli.   · 8/25/16 - Cycle 2 chemotherapy given.   · 9/9/16 - Magnesium 1.3, replaced intravenously. Potassium 3.4 (3.6-5.1), increased oral potassium supplementation.    · 9/16/16 -  Cycle 3 chemotherapy given.   · 9/19/16 - Comprehensive metabolic panel with no significant abnormality.   · 9/20/16 - CT abdomen and pelvis with evidence of progressive metastatic disease in the abdomen and pelvis with interval enlargement of several soft tissue attenuations and subcutaneous nodules in the anterior abdominal wall. Interval enlargement of a left pelvic soft tissue attenuation mass. A lentiform area of low attenuation in the dome of the liver stable in size. Multiple anterior abdominal wall hernias containing fat and/or bowel. Marked pelvic floor laxity. CT chest negative for evidence of metastatic disease. Tiny pulmonary nodules in the right lung stable since 2013 consistent with a benign etiology.   · 9/23/16 - Macrobid 100 mg p.o. b.i.d. x7 days prescribed for UTI. Current chemotherapy discontinued after discussion and new chemotherapy orders written. Carboplatin AUC-5 IV day 1 and Doxil (Liposomal Doxorubicin) 30 mg/M2 IV day 1 to cycle q. 21 days.  of 18 (0-35). Magnesium 1.6, replaced intravenously. Comprehensive metabolic panel with potassium 3.4 (3.6-5.1).     · 10/13/16 - Patient started on cycle 1 of Carboplatin and Liposomal Doxorubicin followed by Neulasta.   · 11/3/16 - Cycle 2 Carbo and Doxil given.   · 11/17/16 - Magnesium 1.0, replaced intravenously. Oral potassium supplement increased.   · 11/29/16 - CT chest with small non-calcified right pulmonary nodules unchanged. Benign bone island in the midthoracic vertebral body along with benign bony hemangiomas in the middle thoracic vertebral bodies. CT abdomen and pelvis with mild progressive metastatic disease from CT of September 2016. Anterior abdominal wall mass superiorly mildly increased in size with new area noted as described measuring 3 x 1.7 cm. 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 Stable area of decreased uptake in the dome of the liver not hypermetabolic on recent PET  scan, likely representing cyst or focal fatty infiltration. Stable small hiatal hernia, fat-containing Bochdalek’s hernia and anterior abdominal wall hernias with stable pelvic floor relaxation.    · 12/1/16 - Cycle 3 chemotherapy given.   · 12/8/16 - Current chemotherapy discontinued and patient started on Gemcitabine 1000 mg/M2 IV days 1, 8, 15 q. 28 days.   · 12/22/16 - Patient seen in followup by Juliana Roy M.D. at the pain clinic, treated with Oxycodone and Lyrica. Transaminases normal. Patient started cycle 1 chemotherapy.   · 12/29/16 - Magnesium 1.1 (1.8-2.5), replaced intravenously.   · 1/5/17 - Cycle 1 day 15 chemotherapy held as patient leaving for vacation to [place]. Chemotherapy dose decreased by 20%. Patient complains of diarrhea for which Imodium prescribed.   · 1/11/17 - BRCA-1 and BRCA-2 negative.   · 1/12/17 - Echocardiogram with ejection fraction 60% or greater.   · 1/19/17 - Comprehensive metabolic panel with no significant abnormality. Patient started cycle 2 chemotherapy.   · 2/2/17 - Urine with >100,000 E-coli treated with Cipro 500 mg p.o. b.i.d. x1 week.   · 2/6/17 - Magnesium 1.1 (1.8-2.5), replaced intravenously.    · 2/23/17 - Patient started cycle 3 chemotherapy.   · 2/27/17 - CT chest with interval development of too numerous to count very small pulmonary nodules, ill-defined ground glass opacities concerning for development of pulmonary metastatic disease. Stable left-sided chest port. CT abdomen and pelvis with stable appearance of multiple small soft tissue densities within the abdomen near midline subcutaneous fat of the abdominal wall. Interval decrease in size of largely calcified left pelvic mass and multiple fat in bowel containing small ventral hernias.   · 3/6/17 - Pulmonary consultation obtained for lung nodules. Discussed CT results with patient. Decision made to continue present chemotherapy until further lung evaluation as abdominal disease better.  of 18  (0-35).    · 3/16/17 - Patient started cycle 4 chemotherapy, but treatment held from day 8 onwards because of hospitalization.   · 3/19/17 - Patient was hospitalized at [facility] between 3/19/17 and 3/25/17 with chest pain. Chest x-ray had revealed increased mild bibasilar airspace disease. Troponin I and EKG’s were negative. INR was elevated. Patient was treated with antibiotics. EGD was performed revealing small hiatal hernia and esophageal dilatation was performed. Bronchoscopy was performed also with no intrabronchial lesions identified. IgA was 51 (), IgG 320 (600-1500) and IgM 19 (). Lexiscan nuclear stress test had no evidence of reversible myocardial ischemia with normal left ventricular ejection fraction of 58%. CT chest had development of patchy bilateral ground glass opacities most pronounced involving the left upper lobe and bilateral lower lobes. Multiple tiny bilateral pulmonary nodules were less conspicuous. The patient underwent a bronchoscopy by Jerica Esparza M.D. with right upper lobe bronchoalveolar lavage revealing benign squamous cells and bronchial cells with pulmonary macrophages present. There was mild acute inflammation. Negative for malignant cells. Prilosec was changed to Famotidine for hypomagnesemia.    · 4/6/17 - Magnesium 1.2 (1.8-2.5). Comprehensive metabolic panel with no significant abnormality. Patient seen in follow-up by Fito Ram M.D. at [facility]. Treated with Lyrica and Oxycodone.    · 5/4/17 - Patient complains of a rash itching on her right upper arm. Eczematous rash noted and patient prescribed Cyclocort 0.1% b.i.d. Magnesium infusions continued with each chemo. Order written to resume chemotherapy.   · 5/16/17 - Cycle 5 chemotherapy started.   · 5/26/17 - Magnesium 1.4 (1.8-2.5), replaced intravenously. Potassium 2.9 (3.6-5.1), KCL increased to 20 mEq three in the morning and three in the evenings.   · 5/30/17 - PET scan with remaining metabolic  activity within the nodular areas. The suggested mass which is partially calcified and necrotic within the left aspect of the pelvis seems slightly larger with increased metabolic activity. There was more calcification in these areas compared to prior study, suggesting inflammation.      · 6/8/17 - Patient complaining of shortness of breath. Does take HCTZ at home. Chest x-ray ordered and chemotherapy continued along with weekly magnesium replacement. Chest x-ray with no acute cardiopulmonary abnormalities.   · 6/13/17 - Patient received cycle 6 of chemotherapy.   · 7/31/17 - WBC 5.0, hemoglobin 11.2, MCV 89.7, platelet count 217,000. Patient is to resume chemotherapy starting with cycle 7 on Wednesday of this week.  of 19 (<35). Potassium 3.3 (3.5-5.3).     · 8/2/17 - Patient began cycle 7 of chemotherapy.   · 8/30/17 - Patient started cycle 8 chemotherapy.   · 9/5/17 - Patient seen in followup at Pain Management by Fito Ram M.D. and continued on treatment with Oxycodone and Lyrica.   · 9/13/17 - Patient was hospitalized at [facility] for a day with dizziness secondary to dehydration, hypokalemia and anemia. She was given 1 unit of packed red blood cells and electrolytes were replaced. Chest x-ray revealed a subtle opacity in the left lateral lung base favored to represent atelectasis. Magnesium 1.3 (1.5-2.5), patient continued on replacement.    · 9/22/17 - Chemotherapy and magnesium replacement continued with decrease in chemotherapy dose by 10% secondary to cytopenias.   · 9/25/17 - Cycle 9 chemotherapy started.   · 10/12/17 - CT of the chest with contrast showed several tiny pulmonary nodules. One within the right lower lobe appears new from prior exams. Two adjacent foci of nodularity within the medial right lower lobe suggestive of minor infectious or inflammatory process. CT of the abdomen and pelvis with contrast which showed soft tissue nodules along the anterior abdominal and pelvic walls  unchanged from previous scan. Also a partially calcified mass within the left pelvis unchanged. No acute process identified within the abdomen or pelvis. Several left paracentral ventral hernias unchanged. No evidence of acute bowel obstruction.   · 10/16/17 - Order written for Levaquin 500 mg p.o. daily as the patient states that she has had a productive cough, fever and chills and with the results of the CT scan showing a possible infectious versus inflammatory process. Order also written to continue chemotherapy and magnesium replacement as previously prescribed.   · 10/23/17 - Cycle 10 chemotherapy started.   · 11/19/17 - Patient went to the Emergency Room at [facility] complaining of right lower extremity redness and fever for a day. Venous Doppler had no evidence of a DVT and patient was discharged home on Clindamycin.   · 11/21/17 -  of 17 (0-35).   · 12/4/17 - Cycle 11 chemotherapy started.   · 12/20/17 - PET scan with multiple hypermetabolic soft tissue nodules abutting the anterior abdominal wall, stable from previous examination. Peripherally calcified left lower quadrant mass near the ascending colon stable in size demonstrating no hypermetabolic uptake. Previously had maximum SUV of 7. Right medial basilar pulmonary nodules resolved.   · 12/22/17 - CT left lower extremity with soft tissue swelling with skin thickening present along the anterior and medial aspect of the leg, slightly more pronounced around the palpable marker seen within the upper medial aspect of the lower extremity.   · 12/26/17 - Elastic stockings prescribed for lower extremity edema.   · 1/8/18 - Patient hospitalized at [facility] between 1/8/18 and 1/11/18 with fever of up to 101 degrees and painful rash on the lower extremities of several weeks’ duration. She was found to be hypokalemic and MRI of the lower extremities revealed thickening and swelling. Chest x-ray had no acute cardiopulmonary abnormality. Skin biopsy was performed by  Surgery revealing stasis dermatitis and no evidence of malignancy. Infectious Disease consultation was obtained and IV antibiotics were stopped. Blood cultures had no growth.   · 1/22/18 - Patient asked to start wearing elastic stockings which she has not started yet. She was given a prescription for Dyazide 1 p.o. daily.   · 2/26/18 - Ordered chemo to resume again. Patient unaware that she was supposed to resume chemo after her last visit in January. Continue magnesium infusions on day 1, 8 and 15. Prescribed Levaquin 500 mg p.o. daily x10 days for productive cough x1 week. History of viral illness consistent with influenza.  of 18 (0-35). Chest x-ray with no acute process.    · 3/5/18 - Cycle 12 chemotherapy started.   · 3/26/18 - Options discussed with patient. Decision made to discontinue IV Gemzar and orders written to start on Niraparib (Zejula) 300 mg p.o. daily as maintenance therapy.   · 4/11/18 - Niraparib discontinued due to insurance denial. Orders written to start Carboplatin-AUC 5 IV day 1 cycling every 21 days. Orders written for Neulasta 6 mg subcutaneous kit day 1 with palliative treatment intent and expected duration of treatment three months.   · 4/12/18 - CT chest, abdomen and pelvis with contrast revealed numerous tiny centrilobular nodules in the lungs favored to reflect infectious or inflammatory process. Nodules ranged 1-3 mm in size and are new from prior studies with metastatic disease not entirely excluded. Stable small hiatal hernia. Interval progression of metastatic disease involving the subcutaneous tissues at the ventral abdominal wall. Multiple soft tissue density nodules previously shown to be hypermetabolic on PET scan. New small crescent of low attenuation material along the periphery of the spleen with similar finding at the dome of the liver. Findings are concerning for peritoneal metastases. Density of the dome of the liver remained unchanged from multiple prior studies.  Stable calcified mass in the left pelvic sidewall. Urinary bladder wall thickening.   · 4/23/18 - Cycle 1 chemotherapy with Carboplatin administered along with Neulasta injection.   · 4/26/18 - Neulasta discontinued due to insurance denial.   · 5/14/18 - Patient received cycle 2 Carboplatin.   · 6/5/18 - Cycle 3 day 1 chemotherapy with Carboplatin administered.   · 6/20/18 - CT chest, abdomen and pelvis at [facility] showed re-demonstration of soft tissue mass in the ventral wall hernia left of the midline as well as the dome of the liver, likely representing metastatic disease similar as compared to the prior study. Additional calcified lesions present in the upper left hemipelvis and along the anterior abdominal wall to the right of the midline also likely representing metastatic disease. No definite new lesions were identified. Moderate to large stool burden likely related to mild constipation was present. No suspicious lung nodules were identified. The previously-described ground glass nodules appeared to have resolved. There was a stable subpleural nodule in the right upper lobe measuring 3 mm present since 2014 without significant changes.   · 6/26/18 - Cycle 4 day 1 Carboplatin administered with addition of Neulasta for febrile neutropenia prophylaxis.   · 6/29/18 - Reviewed CT scans with patient. Orders written to discontinue Carboplatin and Neulasta and plan to start Niraparib 300 mg by mouth daily as maintenance therapy. Prescribed Amlodipine 2.5 mg p.o. daily for chemo-induced hypertension.   · 7/18/18 - Orders written to increase weekly Magnesium Sulfate to 3 g IV weekly due to continued hypomagnesemia.   · 8/1/18 - Patient reports she has not received Niraparib (Zejula) to date.   · 8/30/18 - Patient’s phone was not working so though Niraparib approved, the drug company had not been able to get ahold of her. Patient supplied with drug company number so that she can start taking the pills.    · 9/6/18 -  of 20 (N).   · 9/18/18 - Urinalysis done for dysuria and back pain. Urine culture grew 20,000 to 50,000 colonies of urogenital ruy. Prescribed Bactrim DS one tablet twice daily for one week.   · September 2018 - Patient initiated on Zejula 300 mg p.o. daily.   · 10/1/18 - WBC 3.5, hemoglobin 12, platelet count 74,000. Niraparib (Zejula) dose held and then resumed when platelets above 100,000 at a dose of 200 mg daily.   · 10/15/18 - Patient received Niraparib (Zejula) at 200 mg p.o. daily with a platelet count of 198,000.   · 11/7/18 - WBC 6, hemoglobin 11.6, MCV 96.2, platelet count 137,000. Patient claims to have stopped taking Niraparib a few days prior because of cough. Asked to resume taking it. Ciprofloxacin 500 mg p.o. twice daily for one week for bronchitis.   · 12/3/18 - Magnesium Sulfate infusions changed to every two weeks, to send mag level before and give 3 grams. DC’d Magnesium Oxide and started Magnesium Plus Protein two pills twice daily.   · 1/4/19 - CT chest, abdomen and pelvis with new patchy nodular areas of airspace consolidation within the bilateral upper lobes, left greater than right, favored to be infectious or inflammatory. Interval enlargement of the peritoneal soft tissue masses within the pelvis compatible with progression of disease. Enlarging ventral hernia now containing multiple loops of small bowel and colon.   · 1/7/19 - Patient has not been coming in for weekly magnesium replacement as nursing has not been able to get ahold of her. Results of CT’s discussed with patient. Decision made to change her to IV chemotherapy with Carboplatin AUC-5 day 1 and pegylated liposomal Doxorubicin (Doxil) 30 mg/M2 IV day 1 to cycle every four weeks. Patient made aware of the limited choices of her treatment available.   · 1/31/19 - Echocardiogram showed LVEF 50-55% and otherwise normal echo Doppler study.   · 2/4/19 - New chemotherapy not initiated to date with difficulty  contacting patient for echocardiogram. Neulasta On-Pro 6 mg ordered with chemotherapy due to extensive prior exposure to chemotherapy, increasing risk of febrile neutropenia, history of neutropenic events on prior chemotherapy regimens.   · 2/15/19 - Patient started cycle 1 chemotherapy with Neulasta support.   · 3/6/19 -  of 28 (0-35).   · 3/15/19 - Cycle 2 Carboplatin with Liposomal Doxorubicin (Doxil) and Neulasta support initiated.     · 4/10/19 - Patient was requested to followup with Dr. Queen regarding hypotension and blood pressure medication dosing. Patient appears to be tolerating treatment well with cytopenias not requiring dose adjustments. No Doxil-related skin or mucus membrane toxicities or other significant toxicities to date.   · 4/12/19 - Cycle 3 chemotherapy given.   · 4/16/19 - CT chest, abdomen and pelvis with no evidence of active metastasis to the chest. Several tree-in-bud nodules within the periphery of the inferior right upper lobe likely infectious or inflammatory. Disease burden within abdomen and pelvis stable to slightly increased from prior CT. Specifically, a soft tissue mass along the right rectus muscle slightly increased in bulk. Multiple ventral hernias, some containing loops of bowel, with no evidence of acute bowel obstruction.   · 5/8/19 - Discussed CT results with patient. Overall stable disease and would like to continue same treatment. Dove soap and Hydrocortisone Cream p.r.n. prescribed for scattered rash on back.   · 5/10/19 - Cycle 4 Carboplatin and Liposomal Doxorubicin initiated.   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 It was ER positive, HER2/zuleima negative, PD-L1 negative. There was TOPO1 positivity and TUBB3 positivity. TMB was low (two mutations per megabyte MUTS/MB) and MSI was stable. There were 13 therapies considered with increased benefit. The 13 therapies with increased benefit  included Fulvestrant, Irinotecan, Letrozole, Topotecan, Anastrazole, Exemestane, Tamoxifen, all of which were referenced to [facility] as well as Abemaciclib, Everolimus, Gefitinib, Palbociclib, Ribociclib and Toremifene.     · 6/5/19 echocardiogram with preserved LV systolic function with EF around 60%.  · 6/7/19 cycle 5 chemotherapy with Neulasta support given.  Patient continued on mag oxide 400 mg p.o. twice daily and weekly IV 3 g mag sulfate infusions for persistent hypomagnesemia.  · 7/5/2019- cycle 6 chemotherapy with carboplatin and doxorubicin with Neulasta port initiated.  Ca125:  21 (0-35).  · 7/23/2019-CT chest abdomen and pelvis compared to CT from 4/16/2019 revealed multiple small pulmonary nodules unchanged from prior examination within the right upper and left lower lobes.  Complex ventral hernia similar to prior exam.  Soft tissue nodule along the right lateral margin of the right of the midline measuring 12 x 10 mm unchanged in size.  Irregular soft tissue nodular thickening along the margin of the most inferior aspect of the complex ventral hernia with thickness measuring up to 13 mm similar to prior examination.  Extensive calcified and soft tissue mass within the left lower quadrant decrease in size now measuring 2.6 x 2.3 cm previously 3.0 x 2.5 cm.  Partially calcified mass involving the right rectus sheath measuring 3.1 x 2.7 cm previously measured 3.3 x 2.9 cm.  Densely calcified mass measuring 2.1 x 1.6 cm unchanged previously measured 2 x 1.7 cm.  Right external iliac chain lymph node measuring 9 mm previously measured 5 mm.  · 8/2/2019 cycle 7 chemotherapy given.  · 8/30/2019-cycle 8 chemotherapy with carboplatin and doxorubicin with Neulasta support given.  · 9/27/2019 cycle 9 chemotherapy given.  · 10/1/19 - Echocardiogram showed Normal LV size and contractility EF of 60-65%. Normal RV size. Borderline left atrial enlargement. Aortic valve, mitral valve, tricuspid valve appears structurally  normal, no significant regurgitation seen.No pericardial effusion seen. Proximal aorta appears normal in size.  · 10/18/19 - Magnesium 1.5 (1.8-2.5).  IV magnesium replacement continued.  · 10/25/2019 cycle 10 chemotherapy given.  · 10/29/2019 patient had CT scan of the chest abdomen and pelvis-there is a new 2 mm nodule in the left lower lobe with a stable 2 mm nodule in the left lower lobe.  Follow-up in 6 months was recommended.  Stable multiple soft tissue density and calcified nodules within the ventral abdominal wall and left retroperitoneal fat consistent with nonviable metastatic disease.  These nodules have either decreased in size or stable.  · 11/22/2019 10 received cycle 11 of chemotherapy with Doxil and carboplatinum with Neulasta  · 12/8/2019 to 12/11/2019 patient was admitted to the hospital for neutropenic fever.  · 12/20/2019 patient received cycle 12 of chemotherapy with Doxil and carboplatinum with Neulasta  · 1/13/20: 2 D echo was normal with EF 56% to 60%  · 1/24/20-Patient received cycle 13 of combination chemotherapy with carboplatinum and Doxil  · 2/3/2020 patient had a  which was 25.4  · 2/21/2020-patient received cycle 14 of chemotherapy with carboplatinum and Doxil  · 3/6/2020 patient had CT scan of the chest, abdomen and pelvis this revealed a 3 mm nodule in the left lower lobe present on prior CT of the abdomen unchanged from July 2019.  Stable 3 mm right upper lobe nodule.  There is a lymph node in the AP window measuring 8 x 9 mm prominent left hilar lymph node measuring 12 mm previously was 7 x 7 mm no significant adenopathy was seen in the abdomen there is an area of irregular soft tissue in the left paramidline ventral hernia which is stable measuring 5.7 cm partially calcified mass in the right rectus measuring 3 x 2.5 cm which is also stable the prominent lymph nodes in the left mid hilum and mediastinum may be reactive or metastatic disease  · 4/17/2020-patient had cycle 15  of single agent carboplatinum  · 5/26/2020 patient had CT scan of the chest, abdomen and pelvis showed 3 new lung nodules measuring 7 mm in the left upper lobe, 5 mm in the left lower lobe and 4 mm in the right lower lobe.  There were additional small lung nodules which were unchanged.  Mildly prominent mediastinal and bilateral hilar lymph nodes mildly increased in size.  Right hilar node 9 mm previously was 5 mm.  Carinal node 9 mm previously was 6 mm.  The nodule at the right side of the hernia sac has increased to 1.5 cm from 1.2 cm.  Also a nodule in the subcutaneous fat currently measures 2.4 was previously 2 cm.  · 5/15/2020-patient received cycle 16 of carboplatinum  · Since the last visit in the office patient patient developed dizzy spell and fell. For that reason she was taken to the emergency room at [facility].    · 7/3/2020 she had brain MRI which showed an 8 mm focus of abnormality in the left aspect of the posterior fossa corresponding to a dural based enhancing lesion thought to represent a calcified meningioma vessels calcified dural based metastasis.  This lesion has a slight local mass-effect and a small amount of surrounding edema.  There is also a 4 to 5 mm rounded focus of abnormal nodular enhancement along the cortex of the left parietal lobe but no significant mass-effect.  This is nonspecific could represent neoplastic process, infectious/inflammatory process or granulomatous disease.  · Patient was seen by neurosurgery, follow-up brain MRI in 8 weeks has been recommended by Dr. Hartman  · 7/9/2020 patient was initiated on single agent topotecan cycle 1 day 1  · 7/16/2020 she received the 8 topotecan  · 8/6/2020 patient received cycle 1 day 15 of topotecan  · 9/2/2020 patient had brain MRI multiple hospital which showed interval increase in size of the metastasis in the left parietal lobe now measuring 9 x 7 mm.  Previously was 5 mm.  There has been interval increase in size of the left superior  cerebellar metastases now measuring 1.3 cm previously was 8 mm.  There was no new metastatic disease identified.  · 9/11/2020 patient received cycle 2-day 15 of single agent topotecan  · Was admitted to the hospital 10/1/2020 for supratherapeutic INR  · 10/13/2020 patient was seen by Dr. Delong she has started stereotactic brain radiation to be completed on 10/28/2020  · 11/6/2020 patient received cycle 3-day 1 of chemotherapy with topotecan  · 11/13/2020 patient received cycle 3-day 8 of topotecan  · 11/30/2020 patient had CT scan of the chest, abdomen and pelvis this showed new reticular nodular interstitial thickening within the right lung mostly in the right middle lobe and right lower lobe worrisome for lymphangitic spread of cancer.  Evidence of progression of metastatic disease in the chest, abdomen and pelvis the new tiny sclerotic foci within the spine worrisome for osseous metastatic disease.  · 12/17/2020 - Discontinued topotecan due to progressive disease with orders written to begin Alimta 500 mg per metered squared IV q. 21 days  · 12/30/2020 - Started cycle 1 day 1 Alimta   · 1/18/2021 patient had bone scan which showed chest metastatic disease.  Xgeva has been ordered  · 3/12/2021 patient received cycle 1 of combination chemotherapy with cisplatinum and  Gemzar  · 4/23/21: Patient had cycle 2 day with cis-platinum and Gemzar  · 4/30/2021: Patient received cycle 2-day 8 of chemotherapy with cis-platinum and Gemzar  · 5/6/2021 patient had a CT scan of the chest which shows evidence of progressive disease     2. Deep vein thrombosis of left upper extremity diagnosis established in October of 2013.  · 10/16/13 - Venous Doppler of left upper extremity.  Impression:  Deep vein thrombosis involving the subclavian, axillary and brachial veins on the left side, superficial vein thrombosis involving the left basilic vein.  · 10/16/13 - Order written for Lovenox 120 mg subcutaneously daily until INR is in  therapeutic range.  Order written for Coumadin 5 mg p.o. daily.  The patient is to follow PT and INR protocol.  · 5/20/16 - Venous Doppler bilateral lower extremities normal.  · 7/30/19 - Patient continued on Coumadin following the INR protocol.      3. Anemia diagnosis established in November 2013 and pernicious anemia diagnosis established March 2016.   · 11/15/13 - Hemoglobin is 7.8.  · 12/2/13 - Orders written to start Aranesp 200 mg subcutaneous every two weeks due to low hemoglobin secondary to chemo induced anemia.  Hemoglobin is 9.7.  Anemia workup is ordered.  · 12/03/13 - Ferritin 179.8 (N), folic acid 8.3 (N), vitamin B12 314, iron 104 (N), TIBC 298 (N), iron saturation 35 (N), Reticulocyte count 0.88 (N).  EPO level 124 (N).  Haptoglobin 22 (H).  · 10/22/14 - Hemoglobin 12.5, hematocrit 37.3, MCV 91.6.  · 10/23/14 - Anemia resolved.   · 3/8/16 - WBC 5.8, hemoglobin 11.7, MCV 90.3, platelet count 245,000. Vitamin B12 of 189 (180-914), ferritin 61 (), iron 91 (), TIBC 345 (228-428).   · 3/15/16 -  ().        · 3/22/16 - Intrinsic factor antibody positive, antiparietal antibody negative. Vitamin B12 at 1000 mcg IM weekly started, but the patient after receiving first dose on 3/22/16 did not come back for injections until 4/13/16.   · 4/13/16 - WBC 5.1, hemoglobin 12.5, MCV 87.9, platelet count 201,000. Patient given B12 injection and then continued at home.   · 5/10/16 - WBC 5.1, hemoglobin 12.5, platelet count 201,000.   · 6/14/16 - Monthly Vitamin B12 injections continued at home.   · 7/11/16 - WBC 5.48, hemoglobin 12.7, MCV 86.2, platelet count 200,000.   · 8/16/16 - Patient continued on monthly Vitamin B12 injections at home.   · 1/5/17 - WBC 2.6 with 38% neutrophils, 56% lymphocytes, 5% monocytes, hemoglobin 10.5, .3, platelet count 63,000. Patient continued on Vitamin B12 injections 1000 mcg IM monthly at home.   · 3/20/17 - Retic 0.41 (0.5-1.5), creatinine 1.0  (0.4-1.0). Iron 12 (), TIBC 270 (228-428), ferritin 259 (), haptoglobin 340 (), TSH 0.43 (0.34-5.6), folate 6.0 (5.9-24.8). Serum protein electrophoresis revealed decreased gammaglobulins. Serum EDU had no monoclonal gammopathy identified. Stool Hemoccult was negative.   · 6/8/17 - WBC 6.3, hemoglobin 8.3, MCV 92.4, platelet count 50,000. Procrit 40,000 units subq weekly added for chemotherapy-induced anemia. Patient continued on Vitamin B12 at 1000 mcg IM monthly at home.   · 7/5/17 - Iron 33 (), TIBC 328 (228-428), ferritin 211 (), TSH 2.46 (0.34-5.6).       · 7/31/17 - WBC 5.0, hemoglobin 11.2, MCV 89.7, platelet count 217,000. We will continue with the Procrit 40,000 units subq weekly for chemo-induced anemia when the hemoglobin falls below 10.0 and the patient is also to continue with the Vitamin B12 at 1000 mcg IM monthly at home. Creatinine 1.24 (0.5-0.99).    · 8/28/17 - WBC 9.6, hemoglobin 8.7, MCV 93.7, platelet count 133,000. Patient is to continue with the Procrit 40,000 units subq weekly and will receive that today. She is also to continue with the Vitamin B12 at 1000 mcg IM monthly at home.  · 10/16/17 - WBC 7.5, hemoglobin 9.6, MCV 98.4, platelet count 195,000. Patient is to continue with Procrit 40,000 units subq weekly and will receive Procrit today.   · 1/1/18 - Creatinine 1.3 (0.4-1.0).    · 2/19/18 - Procrit given for hemoglobin 9.9.   · 2/26/18 - Continue Procrit 40,000 units weekly p.r.n. hemoglobin <10. Continue Vitamin B12 at 1000 mcg IM monthly at home.   · 3/26/18 - Hemoglobin 8.3. Procrit dose increased to 60,000 units weekly. Iron 29 (), TIBC 306 (228-428), and iron sat 9% (15-50). Iron 29 (), TIBC 306 (228-428), iron saturation 9% (15-50).   · 3/27/18 - Order written to start Ferrous sulfate 325 mg p.o. b.i.d.    · 4/2/18 - Stool heme negative x3.   · 4/30/18 - Patient had not started Ferrous sulfate 325 mg p.o. b.i.d. and verbalized  understanding to do so and to notify the office if side effects are not tolerable.   · 5/24/18 - Patient was hospitalized at [facility] between 5/24/18 and 5/26/18 with dark tarry stool. INR was subtherapeutic. She was given IV Protonix and Protonix 40 mg daily. She underwent an EGD by David Rogers M.D. revealing small hiatal hernia, small submucosal nodule near the cardia, erosive gastritis. Pathology on gastric antrum and body biopsy revealed iron pill gastritis and no evidence of Helicobacter pylori. She then underwent a colonoscopy revealing diverticulosis, hemorrhoids and surgical changes from previous resection. It was recommended to consider outpatient M2A if hemoglobin continued to drop.   · 6/4/18 - Order written to discontinue iron pills and to use Injectafer 750 mg IV days 1 and 8 for low iron sats. Order written for Procrit 40,000 units subq weekly. Iron 65 (), TIBC 328 (228-428), iron saturation 20% (15-50), ferritin 123 ().   · 6/29/18 - Discussed patient’s intolerance of oral iron due to gastritis. Oral iron discontinued with plans for Injectafer should iron level drop in the future.   · 11/7/18 - Patient claims not to be taking Vitamin B12 injections at home. Asked to resume those.   · 12/3/18 - Patient reports she has not been taking her B12 injections for a couple of months. She needs some syringes and needles for that.   · 2/4/19 - Patient was uncertain if she is currently taking ferrous Sulfate or not. Patient with history of intolerance and will follow hemoglobin.   · 5/8/19 - Ferritin 64 ().  · 8/27/2019- iron 52 (), iron saturation 14% (15-50), TIBC 364 (228-420), and ferritin 74 ().  Injectafer 750 mg IV day 1 and 8 due to oral iron intolerance ordered.  · 8/30/2019- Injectafer 750 mg IV day 1 given.  Hemoglobin 10.8.  At the end of the infusion heart rate was 48 and the patient reported mild dizziness.  Patient was sent to the ED for evaluation with symptoms  resolving rapidly.  Chest x-ray and CT head were negative for acute findings.  · 9/6/2019-Injectafer 750 mg IV day 8 given without adverse effects.  Hemoglobin 9.8.   · 9/25/19 - Iron 85 (). Iron saturation 25 (15-50). TIBC 335 (228-428). Ferritin  694 ().  Hemoglobin 10.3.  · 10/25/2019 hemoglobin 9.7.  Patient started on Retacrit 40,000 units subcu weekly.  · 12/17/2020 hemoglobin 11.9, hematocrit 38.3     Past Medical History:   Diagnosis Date   • Anemia 2013   • Cervical disc disorder 2013    Herniated disc, pinched nerve   • Clotting disorder (CMS/HCC) 2013    Low platelets from chemo   • Colon polyp 2013   • Deep vein thrombosis (CMS/HCC) 2013   • Diverticulosis 2013   • GERD (gastroesophageal reflux disease) 2016   • HL (hearing loss) 2016    I need hearing aids   • Hyperlipidemia 2013    Need my cholesterol rechecked   • Hypertension    • Joint pain 2013    Shoulder pain, torn rotator cuff   • Low back pain 2013   • Neuromuscular disorder (CMS/HCC) 2015    Shingles, pinched nerves in my neck   • Osteopenia 2014   • Osteoporosis    • Ovarian cancer (CMS/HCC)     ovarian--  spread to lungs 10/2020   • Ovarian cancer on left (CMS/HCC) 1995   • Pneumonia 2010    Have had it several times   • Spinal stenosis 2013    Cervical   • Urinary tract infection 2013    Have had several utis       Past Surgical History:   Procedure Laterality Date   • BRONCHOSCOPY N/A 2/10/2021    Procedure: BRONCHOSCOPY with bronchioalveolar lavage;  Surgeon: Jerica Esparza MD;  Location: [facility] ENDOSCOPY;  Service: Pulmonary;  Laterality: N/A;  post op:right lobe pneumonia   • CATARACT EXTRACTION     • COLON SURGERY     • COLONOSCOPY  05/26/2018   • EXPLORATORY LAPAROTOMY     • HERNIA REPAIR     • HYSTERECTOMY     • OOPHORECTOMY           Current Outpatient Medications:   •  acetaminophen (TYLENOL) 500 MG tablet, Take 1,000 mg by mouth Every 6 (Six) Hours As Needed for Mild Pain ., Disp: , Rfl:   •  albuterol sulfate  (90  Base) MCG/ACT inhaler, Inhale 2 puffs Every 4 (Four) Hours As Needed for Wheezing., Disp: 18 g, Rfl: 0  •  atorvastatin (LIPITOR) 20 MG tablet, Take 20 mg by mouth every night at bedtime., Disp: , Rfl: 3  •  calcium carbonate (Calcium 600) 600 MG tablet, Take 1 tablet by mouth 2 (two) times a day., Disp: 60 tablet, Rfl: 11  •  cetirizine (zyrTEC) 10 MG tablet, Take 1 tablet by mouth Every Night., Disp: 30 tablet, Rfl: 0  •  cyanocobalamin 1000 MCG/ML injection, Inject 1,000 mcg into the appropriate muscle as directed by prescriber Every 28 (Twenty-Eight) Days., Disp: , Rfl:   •  dexamethasone (DECADRON) 4 MG tablet, Take 2 tablets in the morning daily on days 2, 3 & 4.  Take with food., Disp: 6 tablet, Rfl: 5  •  etoposide (TOPOSAR;VEPESID) 50 MG chemo capsule, Take 1 capsule (50mg) by mouth every other day, alternate with 2 caps (100mg) every other day for 21 days, off 7 days.  Swallow whole. Refrigerate., Disp: 31 capsule, Rfl: 2  •  famotidine (PEPCID) 40 MG tablet, TAKE 1 TABLET BY MOUTH EVERY MORNING BEFORE BREAKFAST, Disp: 90 tablet, Rfl: 1  •  folic acid (FOLVITE) 1 MG tablet, Take 1,000 mcg by mouth Daily., Disp: , Rfl:   •  guaiFENesin-codeine (Virtussin A/C) 100-10 MG/5ML liquid, Take 5 mL by mouth Every 6 (Six) Hours As Needed for Cough., Disp: 600 mL, Rfl: 0  •  K Phos [place]-Sod Phos Di & Mono (Virt-Phos 250 Neutral) 155-852-130 MG tablet, TAKE 1 TABLET BY MOUTH TWICE DAILY FOR 2 DAYS, Disp: , Rfl:   •  MAGnesium-Oxide 400 (241.3 Mg) MG tablet tablet, Take 400 mg by mouth 2 (Two) Times a Day., Disp: , Rfl:   •  montelukast (Singulair) 10 MG tablet, Take 1 tablet by mouth Every Night., Disp: 90 tablet, Rfl: 1  •  ondansetron (ZOFRAN) 8 MG tablet, Take 1 tablet by mouth 3 (Three) Times a Day As Needed for Nausea or Vomiting., Disp: 30 tablet, Rfl: 5  •  oxyCODONE (ROXICODONE) 10 MG tablet, Take 1 tablet by mouth Every 4 (Four) Hours As Needed for Moderate Pain ., Disp: 180 tablet, Rfl: 0  •  oxyCODONE  (ROXICODONE) 10 MG tablet, Take 1 tablet by mouth Every 4 (Four) Hours As Needed for Moderate Pain ., Disp: 180 tablet, Rfl: 0  •  oxyCODONE (ROXICODONE) 10 MG tablet, Take 1 tablet by mouth Every 4 (Four) Hours As Needed for Moderate Pain ., Disp: 180 tablet, Rfl: 0  •  phosphorus (K PHOS NEUTRAL) 155-852-130 MG tablet, Take 1 tablet by mouth 2 (Two) Times a Day. Take 1 tablet every 12 hours for 2 doses and then stop., Disp: , Rfl:   •  potassium chloride (K-DUR,KLOR-CON) 20 MEQ CR tablet, Take 2 tablets by mouth Daily., Disp: 60 tablet, Rfl: 3  •  pregabalin (LYRICA) 100 MG capsule, Take 1 capsule by mouth 3 (Three) Times a Day., Disp: 90 capsule, Rfl: 2  •  sertraline (ZOLOFT) 50 MG tablet, Take 50 mg by mouth Every Night., Disp: , Rfl:   •  temazepam (RESTORIL) 30 MG capsule, TAKE 1 CAPSULE BY MOUTH AT NIGHT AS NEEDED FOR SLEEP, Disp: 30 capsule, Rfl: 2  •  triamterene-hydrochlorothiazide (DYAZIDE) 37.5-25 MG per capsule, TAKE 1 CAPSULE BY MOUTH EVERY DAY, Disp: 90 capsule, Rfl: 1  •  warfarin (COUMADIN) 5 MG tablet, Take 1 tablet by mouth Daily. At 1700 as directed, Disp: 30 tablet, Rfl: 0  No current facility-administered medications for this visit.    Facility-Administered Medications Ordered in Other Visits:   •  heparin injection 500 Units, 500 Units, Intravenous, PRN, Cindy Ball MD, 500 Units at 05/26/21 1618  •  sodium chloride 0.9 % flush 20 mL, 20 mL, Intravenous, PRN, Cindy Ball MD, 30 mL at 05/26/21 1523    Allergies   Allergen Reactions   • Morphine Nausea Only and Hives   • Penicillin V Potassium Rash   • Sulfa Antibiotics Rash   • Levaquin [Levofloxacin] Nausea Only and Dizziness     When taken with Coumadin   • Amoxicillin Rash       Family History   Problem Relation Age of Onset   • Hypertension Mother    • Cancer Father    • Hypertension Father    • Hypertension Sister    • Hypertension Brother    • Stroke Brother        Cancer-related family history includes Cancer in  "her father.    Social History     Tobacco Use   • Smoking status: Never Smoker   • Smokeless tobacco: Never Used   Vaping Use   • Vaping Use: Never used   Substance Use Topics   • Alcohol use: No     Comment: caffeine 32-64oz qd   • Drug use: No     HPI, ROS and PFSH have been reviewed and confirmed on 5/26/2021.     SUBJECTIVE:    Patient is here today for follow-up.  Patient had her chemo education today with the pharmacist for oral etoposide.  She does not have any new issues.    REVIEW OF SYSTEMS:    Review of Systems   Constitutional: Negative for chills and fever.   HENT: Negative for ear pain, mouth sores, nosebleeds and sore throat.    Eyes: Negative for photophobia and visual disturbance.   Respiratory: Negative for wheezing and stridor.    Cardiovascular: Negative for chest pain and palpitations.   Gastrointestinal: Negative for abdominal pain, diarrhea, nausea and vomiting.   Endocrine: Negative for cold intolerance and heat intolerance.   Genitourinary: Negative for dysuria and hematuria.   Musculoskeletal: Negative for joint swelling and neck stiffness.   Skin: Negative for color change and rash.   Neurological: Negative for seizures and syncope.   Hematological: Negative for adenopathy.        No obvious bleeding   Psychiatric/Behavioral: Negative for agitation, confusion and hallucinations.     OBJECTIVE:  Vitals:    05/26/21 1551   BP: 128/83   Pulse: 76   Resp: 18   Temp: 98.7 °F (37.1 °C)   TempSrc: Oral   Weight: 81.6 kg (180 lb)   Height: 165.1 cm (65\")   PainSc: 0-No pain     Temp 97.5   Pulse 64  RR 18  /92  Height 165.1 cm   Weight 81.6 kg   BSA 1.89  BMI 30    ECOG  (1) Restricted in physically strenuous activity, ambulatory and able to do work of light nature    Physical Exam   Constitutional: She is oriented to person, place, and time. No distress.   Obese    HENT:   Head: Normocephalic and atraumatic.   Mouth/Throat: Mucous membranes are moist.   Eyes: Conjunctivae are normal. Right " eye exhibits no discharge. Left eye exhibits no discharge. No scleral icterus.   Neck: No thyromegaly present.   Cardiovascular: Normal rate, regular rhythm and normal heart sounds. Exam reveals no gallop and no friction rub.   Pulmonary/Chest: Effort normal. No stridor. No respiratory distress. She has no wheezes.   Left chest wall port    Abdominal: Soft. Bowel sounds are normal. She exhibits no mass. There is no abdominal tenderness. There is no rebound and no guarding.   Musculoskeletal: Normal range of motion. No tenderness.   Lymphadenopathy:     She has no cervical adenopathy.   Neurological: She is alert and oriented to person, place, and time. She exhibits normal muscle tone.   Skin: Skin is warm and dry. She is not diaphoretic. No erythema.   Psychiatric: Her behavior is normal. Mood normal.   Nursing note and vitals reviewed.    I have reexamined the patient and the results are consistent with the previously documented exam. Cindy Ball MD     RECENT LABS    WBC   Date Value Ref Range Status   05/26/2021 6.21 3.40 - 10.80 10*3/mm3 Final   07/05/2020 4.04 (L) 4.5 - 11.0 10*3/uL Final     RBC   Date Value Ref Range Status   05/26/2021 3.71 (L) 3.77 - 5.28 10*6/mm3 Final   07/05/2020 3.68 (L) 4.0 - 5.2 10*6/uL Final     Hemoglobin   Date Value Ref Range Status   05/26/2021 10.8 (L) 12.0 - 15.9 g/dL Final   07/05/2020 11.5 (L) 12.0 - 16.0 g/dL Final     Hematocrit   Date Value Ref Range Status   05/26/2021 35.8 34.0 - 46.6 % Final   07/05/2020 35.3 (L) 36.0 - 46.0 % Final     MCV   Date Value Ref Range Status   05/26/2021 96.5 79.0 - 97.0 fL Final   07/05/2020 95.9 80.0 - 100.0 fL Final     MCH   Date Value Ref Range Status   05/26/2021 29.1 26.6 - 33.0 pg Final   07/05/2020 31.3 26.0 - 34.0 pg Final     MCHC   Date Value Ref Range Status   05/26/2021 30.2 (L) 31.5 - 35.7 g/dL Final   07/05/2020 32.6 31.0 - 37.0 g/dL Final     RDW   Date Value Ref Range Status   05/26/2021 18.6 (H) 12.3 - 15.4 %  Final   07/05/2020 15.9 12.0 - 16.8 % Final     RDW-SD   Date Value Ref Range Status   05/26/2021 61.9 (H) 37.0 - 54.0 fl Final     MPV   Date Value Ref Range Status   05/26/2021 10.9 6.0 - 12.0 fL Final   07/05/2020 10.2 6.7 - 10.8 fL Final     Platelets   Date Value Ref Range Status   05/26/2021 261 140 - 450 10*3/mm3 Final   07/05/2020 158 140 - 440 10*3/uL Final     Neutrophil Rel %   Date Value Ref Range Status   07/05/2020 54.0 45 - 80 % Final     Neutrophil %   Date Value Ref Range Status   05/26/2021 63.3 42.7 - 76.0 % Final     Lymphocyte Rel %   Date Value Ref Range Status   07/05/2020 30.4 15 - 50 % Final     Lymphocyte %   Date Value Ref Range Status   05/26/2021 19.3 (L) 19.6 - 45.3 % Final     Monocyte Rel %   Date Value Ref Range Status   07/05/2020 12.4 0 - 15 % Final     Monocyte %   Date Value Ref Range Status   05/26/2021 15.9 (H) 5.0 - 12.0 % Final     Eosinophil %   Date Value Ref Range Status   05/26/2021 1.0 0.3 - 6.2 % Final   07/05/2020 3.0 0 - 7 % Final     Basophil Rel %   Date Value Ref Range Status   07/05/2020 0.2 0 - 2 % Final     Basophil %   Date Value Ref Range Status   05/26/2021 0.5 0.0 - 1.5 % Final     Immature Grans %   Date Value Ref Range Status   07/05/2020 0.0 0 % Final     Neutrophils Absolute   Date Value Ref Range Status   07/05/2020 2.18 2.0 - 8.8 10*3/uL Final     Neutrophils, Absolute   Date Value Ref Range Status   05/26/2021 3.93 1.70 - 7.00 10*3/mm3 Final     Lymphocytes Absolute   Date Value Ref Range Status   07/05/2020 1.23 0.7 - 5.5 10*3/uL Final     Lymphocytes, Absolute   Date Value Ref Range Status   05/26/2021 1.20 0.70 - 3.10 10*3/mm3 Final     Monocytes Absolute   Date Value Ref Range Status   07/05/2020 0.50 0.0 - 1.7 10*3/uL Final     Monocytes, Absolute   Date Value Ref Range Status   05/26/2021 0.99 (H) 0.10 - 0.90 10*3/mm3 Final     Eosinophils Absolute   Date Value Ref Range Status   07/05/2020 0.12 0.0 - 0.8 10*3/uL Final     Eosinophils, Absolute    Date Value Ref Range Status   05/26/2021 0.06 0.00 - 0.40 10*3/mm3 Final     Basophils Absolute   Date Value Ref Range Status   07/05/2020 0.01 0.0 - 0.2 10*3/uL Final     Basophils, Absolute   Date Value Ref Range Status   05/26/2021 0.03 0.00 - 0.20 10*3/mm3 Final     Immature Grans, Absolute   Date Value Ref Range Status   07/05/2020 0.00 <1 10*3/uL Final     nRBC   Date Value Ref Range Status   03/18/2021 0.0 0.0 - 0.2 /100 WBC Final       Lab Results   Component Value Date    GLUCOSE 96 04/30/2021    BUN 24 (H) 04/30/2021    CREATININE 0.80 05/11/2021    EGFRIFNONA 60 (L) 04/30/2021    EGFRIFAFRI >60 06/07/2019    BCR 25.5 (H) 04/30/2021    K 3.7 04/30/2021    CO2 28.0 04/30/2021    CALCIUM 8.5 (L) 04/30/2021    ALBUMIN 3.60 04/30/2021    LABIL2 1.3 07/03/2020    AST 17 04/30/2021    ALT 14 04/30/2021     ASSESSMENT:    1. 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  She was on carboplatinum, Doxil.  Patient had had carboplatinum Taxol, carbo Taxotere, Gemzar single agent, niraparib and was on Doxil and carboplatin.  Doxil was discontinued due to approaching of lifetime dosing.  Progressed on single agent topotecan and Alimta.  Refer to paradigm testing for future options at time of progression.  Patient has had progression of disease and therefore platinum was discontinued. On exam today I felt a right lower abdominal mass which is new.  She has CT scan of the chest, abdomen and pelvis on 3/1/2021 this showed moderate size pericardial effusion, scattered pulmonary nodules with septal thickening worse in the right lung compared to the left.  Pathologically enlarged hilar and mediastinal lymph nodes were suspicious for metastatic disease.  CT of the abdomen and pelvis showed increase in size of multiple soft tissue masses involving the anterior abdominal wall likely related to worsening of metastatic disease.  Increase in size of multiple sclerotic lesions  within the lumbar spine and pelvis.  A 2D echocardiogram which did not reveal any significant pericardial effusion on 3/4/2021.  2. Patient has a subcutaneous nodule noted on the anterior abdominal wall consistent with metastatic disease currently measures 3.5 cm.  The anterior abdominal wall nodule has not significantly changed.  We will continue to monitor as the index cutaneous lesion.  3. Patient to start palliative oral etoposide single agent on 5/28/2021  4. She has developed progressive cough for that reason CTX scan of the chest was obtained on 5/6/2021 which shows clear evidence of progressive disease.  Patient is not hypoxic.  Her sats on room air today was 98%.  For that reason we will discontinue Gemzar cis-platinum and place patient on oral etoposide single agent.  Reviewed the side effects include but not limited to nausea vomiting bone marrow suppression and the need for possible blood transfusions, risk of neutropenia which may result in infections and hospitalization.  Would also like to perform dose reduction since patient has been pretty heavily treated.  5. Patient has bone metastases therefore I recommended Xgeva 120 mg subcu monthly.  Reviewed the side effects, benefits in detail with patient.  She would also be initiated on Os-Johnny D 600 mg twice a day.  She will schedule with her dentist prior to initiating Xgeva.  6. Progressive brain metastasis: Status post stereotactic brain radiation under the care of Dr. Delong and recent MRI of the brain on 12/15/2020 shows probable progression.  Recent brain MRI did not show any obvious signs of progression.  7. B12 deficiency on parenteral B12 injections  8. History of DVT on anticoagulation therapy with warfarin:   9. Pancytopenia: Due to chemotherapy: On Procrit  10. Hypomagnesemia: Continue to monitor levels and infuse as needed  11. Iron deficiency anemia: Monitoring  12. Post hospital visit  13. Assessment has been reviewed and updated as documented  above    Discussion:    Reviewed her CT scans with patient.  She has progressive disease.  We will discontinue Gemzar cis-platinum and begin single agent etoposide.  Reviewed the side effects in detail with patient to include but not limited to:  Chemotherapy side effects include, but not limited to, nausea, vomiting, bone marrow suppression, which can result in blood, platelet transfusion. There is also risk of permanent bone marrow destruction, which can cause myelodysplastic syndrome or leukemia years down the line. There is risk of infection which can result in hospitalization and even death. There is also risk of fatigue, asthenia, alopecia which could become permanent. Chemo will help to reduce risk of relapse of cancer, but does not eliminate risk completely.            PLAN:    1. Continue supportive care  2. Begin single agent oral etoposide on 5/28/2021  3. INR early next week due to drug interaction with etoposide  4. Continue monthly port flushes  5. Discontinue Gemzar cis-platinum  6. Obtain CT scan of the abdomen and pelvis with contrast in about 2 weeks  7. Follow-up in 3 weeks or earlier as needed for problems  8. Given information on oral etoposide  9. Draw a CA-125 today  10. Continue Xgeva 120 mg subcu monthly  11. Continue calcium carbonate with Vitamin D 600 mg twice a day  12. Continue folic acid 1 mg p.o. daily -reviewed needs to stay on drug to prevent side effects from Alimta   13. Continue B12 injections 1000 mcg IM monthly, next due 1/14/2021  14. Status post IV Injectafer  15. Continue magnesium oxide 400 mg three times a day and weekly IV replacement as needed.   16. Continue Retacrit 40,000 units subcu weekly for hemoglobin less than 10, for chemotherapy-induced anemia  17. Continue supportive medications  18. MRI brain reviewed  19. All questions answered     I have reviewed labs results, imaging, vitals, and medications with the patient today.   Lab Results   Component Value Date     WBC 6.21 05/26/2021    HGB 10.8 (L) 05/26/2021    HCT 35.8 05/26/2021    MCV 96.5 05/26/2021     05/26/2021     Patient verbalized understanding and is in agreement of the above plans.        I spent 30 total minutes, face-to-face, caring for Tahira today.  90% of this time involved counseling and/or coordination of care as documented within this note.

## 2021-05-26 NOTE — PROGRESS NOTES
Patient in for port flush and blood draw.  10 cc blood wasted prior to specimen collection.  Specimens collected and sent to lab for processing.;\

## 2021-05-27 NOTE — PROGRESS NOTES
Oral Chemotherapy Teaching      Patient Name/:  Tahira Zimmerman   1955  Oral Chemotherapy Regimen: Etoposide 50mg every other day alternating with 100mg every other day for 21 days then 7 days off  Date Started Medication: 21    Initial Teaching Follow Up Comments     Safety     Storage instructions (away from children; away from heat/cold, sunlight, or moisture), handling - use of gloves (caregivers), washing hands after touching pills, managing waste     “How are you storing your medications?”, reminders on storage, proper handling (caregivers using gloves, washing hands, away from children, managing waste, etc.), disposal of medication with D/C or dosage change    Patient counseled on hazardous handling and storage precautions. Discussed plan to wash hands after handling. Caregivers to handle with latex gloves. Reviewed safe sex practices. Discussed appropriate management of bodily fluids. Store at refrigeration, away from pets and children. Discussed keeping on top shelf of refrigerator in storage container. Pt verbalized understanding.        Adherence      patient and/or caregiver on how to take medication, take with/without food, assess their adherence potential, stress importance of adherence, ways to manage adherence (pill boxes, phone reminders, calendars), what to do if miss a dose   “How are you taking your medication?” “How are you remembering to take your medication?”, “How many doses have you missed?”, determine reasons for non-adherence (not remembering, side effects, etc), ways to improve, overadherence? Remind patient of ways to improve/maintain adherence   Discussed importance of taking medication as prescribed.  Patient also facetimed her daughter, Pati, and went over instructions for taking medication with her also.  Pati reports that she helps her mother manage her medications.  Discussed how to handle a missed dose and to never take two doses at once.  A personalized  treatment calendar of alternating dosing schedule given to patient and she plans to put on refrigerator and santo dose each day to ensure correct dose taken.       Side Effects/Adverse Reactions      patient on potential side effects, s/s, ways to manage, when to call MD/seek help     Determine if patient experiencing side effects, ways to manage  Discussed potential side effects with patient and daughter including decreased wbc and increased risk of infection, decreased hgb, decreased PLT and increased risk of bleeding, nausea and/or vomiting, alopecia, and bone marrow suppression.  Discussed when to call the MD office and when to seek emergency help.  Discussed ways to minimize nausea/vomiting.     Miscellaneous     Food interactions, DDIs, financial issues Determine if patient started any new medications since being placed on oral chemo (analyze for DDI) Drug-drug interaction with warfarin.  Plan to increase monitoring of INR to ensure therapeutic.     Additional Notes:  Discussed aforementioned material with patient in clinic and her daughter over the phone. All questions and concerns addressed. Provided patient with my contact information and instructions to call should additional questions arise. Obtained signed CCA and consent. Provided patient with personalized treatment calendar and written medication plan.

## 2021-06-01 NOTE — PROGRESS NOTES
Pt here for INR.  INR is in range.  Pt to continue 5 mg daily.  Per Nancy, pharmacist, pt needs to have her INR checked 2 x a week per orders from Dr Ball.  Nancy spoke w/ pt today regarding her oral Etoposide and that it could affect her INR.  Pt v/u and added to schedule on Friday 6/4

## 2021-06-01 NOTE — PROGRESS NOTES
MTM Follow Up: Etoposide    Tahira was in office today.  She reports utilizing the calendar to ensure dosing correct for alternating schedule.  She is having some nausea and has been taking the ondansetron once a day and a couple days twice but still having nausea.  Asked her to take the ondansetron every 8 hours and I would follow up with her Thursday to see if that is effective in preventing nausea.  She is taking etoposide at 2100.

## 2021-06-02 NOTE — PROGRESS NOTES
PULMONARY/ CRITICAL CARE/ SLEEP MEDICINE OUTPATIENT CONSULT/ FOLLOW UP NOTE        Patient Name:  Tahira Zimmerman    :  1955    Medical Record:  [id number]    PRIMARY CARE PHYSICIAN     Noemy Queen MD    REASON FOR CONSULTATION    Tahira Zimmerman is a 65 y.o. female who is referred for consultation for dyspnea and cough  REVIEW OF SYSTEMS    Constitutional:  Denies fever or chills   Eyes:  Denies change in visual acuity   HENT:  Denies nasal congestion or sore throat   Respiratory:  Denies cough or shortness of breath   Cardiovascular:  Denies chest pain or edema   GI:  Denies abdominal pain, nausea, vomiting, bloody stools or diarrhea   :  Denies dysuria   Musculoskeletal:  Denies back pain or joint pain   Integument:  Denies rash   Neurologic:  Denies headache, focal weakness or sensory changes   Endocrine:  Denies polyuria or polydipsia   Lymphatic:  Denies swollen glands   Psychiatric:  Denies depression or anxiety     MEDICAL HISTORY    Past Medical History:   Diagnosis Date   • Anemia    • Cervical disc disorder     Herniated disc, pinched nerve   • Clotting disorder (CMS/[facility])     Low platelets from chemo   • Colon polyp    • Deep vein thrombosis (CMS/[facility])    • Diverticulosis    • GERD (gastroesophageal reflux disease) 2016   • HL (hearing loss) 2016    I need hearing aids   • Hyperlipidemia 2013    Need my cholesterol rechecked   • Hypertension    • Joint pain     Shoulder pain, torn rotator cuff   • Low back pain    • Neuromuscular disorder (CMS/[facility])     Shingles, pinched nerves in my neck   • Osteopenia    • Osteoporosis    • Ovarian cancer (CMS/[facility])     ovarian--  spread to lungs 10/2020   • Ovarian cancer on left (CMS/[facility])    • Pneumonia     Have had it several times   • Spinal stenosis     Cervical   • Urinary tract infection 2013    Have had several utis        SURGICAL HISTORY    Past Surgical History:   Procedure Laterality  Date   • BRONCHOSCOPY N/A 2/10/2021    Procedure: BRONCHOSCOPY with bronchioalveolar lavage;  Surgeon: Jerica Esparza MD;  Location: [facility];  Service: Pulmonary;  Laterality: N/A;  post op:right lobe pneumonia   • CATARACT EXTRACTION     • COLON SURGERY     • COLONOSCOPY  05/26/2018   • EXPLORATORY LAPAROTOMY     • HERNIA REPAIR     • HYSTERECTOMY     • OOPHORECTOMY          FAMILY HISTORY    Family History   Problem Relation Age of Onset   • Hypertension Mother    • Cancer Father    • Hypertension Father    • Hypertension Sister    • Hypertension Brother    • Stroke Brother        SOCIAL HISTORY    Social History     Tobacco Use   • Smoking status: Never Smoker   • Smokeless tobacco: Never Used   Substance Use Topics   • Alcohol use: No     Comment: caffeine 32-64oz qd        ALLERGIES    Allergies   Allergen Reactions   • Morphine Nausea Only and Hives   • Penicillin V Potassium Rash   • Sulfa Antibiotics Rash   • Levaquin [Levofloxacin] Nausea Only and Dizziness     When taken with Coumadin   • Amoxicillin Rash         MEDICATIONS    Current Outpatient Medications on File Prior to Visit   Medication Sig Dispense Refill   • acetaminophen (TYLENOL) 500 MG tablet Take 1,000 mg by mouth Every 6 (Six) Hours As Needed for Mild Pain .     • albuterol sulfate  (90 Base) MCG/ACT inhaler Inhale 2 puffs Every 4 (Four) Hours As Needed for Wheezing. 18 g 0   • atorvastatin (LIPITOR) 20 MG tablet Take 20 mg by mouth every night at bedtime.  3   • calcium carbonate (Calcium 600) 600 MG tablet Take 1 tablet by mouth 2 (two) times a day. 60 tablet 11   • cetirizine (zyrTEC) 10 MG tablet Take 1 tablet by mouth Every Night. 30 tablet 0   • cyanocobalamin 1000 MCG/ML injection Inject 1,000 mcg into the appropriate muscle as directed by prescriber Every 28 (Twenty-Eight) Days.     • dexamethasone (DECADRON) 4 MG tablet Take 2 tablets in the morning daily on days 2, 3 & 4.  Take with food. 6 tablet 5   • etoposide  (TOPOSAR;VEPESID) 50 MG chemo capsule Take 1 capsule (50mg) by mouth every other day, alternate with 2 caps (100mg) every other day for 21 days, off 7 days.  Swallow whole. Refrigerate. 31 capsule 2   • famotidine (PEPCID) 40 MG tablet TAKE 1 TABLET BY MOUTH EVERY MORNING BEFORE BREAKFAST 90 tablet 1   • folic acid (FOLVITE) 1 MG tablet Take 1,000 mcg by mouth Daily.     • guaiFENesin-codeine (Virtussin A/C) 100-10 MG/5ML liquid Take 5 mL by mouth Every 6 (Six) Hours As Needed for Cough. 600 mL 0   • K Phos Graham-Sod Phos Di & Mono (Virt-Phos 250 Neutral) 155-852-130 MG tablet TAKE 1 TABLET BY MOUTH TWICE DAILY FOR 2 DAYS     • MAGnesium-Oxide 400 (241.3 Mg) MG tablet tablet Take 400 mg by mouth 2 (Two) Times a Day.     • montelukast (SINGULAIR) 10 MG tablet TAKE 1 TABLET BY MOUTH EVERY NIGHT 30 tablet 2   • ondansetron (ZOFRAN) 8 MG tablet Take 1 tablet by mouth 3 (Three) Times a Day As Needed for Nausea or Vomiting. 30 tablet 5   • ondansetron ODT (ZOFRAN-ODT) 8 MG disintegrating tablet PLACE 1 TABLET UNDER THE TONGUE EVERY 8 (EIGHT) HOURS AS NEEDED FOR NAUSEA OR VOMITING. 30 tablet 3   • oxyCODONE (ROXICODONE) 10 MG tablet Take 1 tablet by mouth Every 4 (Four) Hours As Needed for Moderate Pain . 180 tablet 0   • oxyCODONE (ROXICODONE) 10 MG tablet Take 1 tablet by mouth Every 4 (Four) Hours As Needed for Moderate Pain . 180 tablet 0   • oxyCODONE (ROXICODONE) 10 MG tablet Take 1 tablet by mouth Every 4 (Four) Hours As Needed for Moderate Pain . 180 tablet 0   • phosphorus (K PHOS NEUTRAL) 155-852-130 MG tablet Take 1 tablet by mouth 2 (Two) Times a Day. Take 1 tablet every 12 hours for 2 doses and then stop.     • potassium chloride (K-DUR,KLOR-CON) 20 MEQ CR tablet Take 2 tablets by mouth Daily. 60 tablet 3   • pregabalin (LYRICA) 100 MG capsule Take 1 capsule by mouth 3 (Three) Times a Day. 90 capsule 2   • sertraline (ZOLOFT) 50 MG tablet Take 50 mg by mouth Every Night.     • temazepam (RESTORIL) 30 MG capsule  "TAKE 1 CAPSULE BY MOUTH AT NIGHT AS NEEDED FOR SLEEP 30 capsule 2   • triamterene-hydrochlorothiazide (DYAZIDE) 37.5-25 MG per capsule TAKE 1 CAPSULE BY MOUTH EVERY DAY 90 capsule 1   • warfarin (COUMADIN) 5 MG tablet Take 1 tablet by mouth Daily. At 1700 as directed 30 tablet 0     No current facility-administered medications on file prior to visit.       PHYSICAL EXAM    Vitals:    06/02/21 0954   BP: 117/77   BP Location: Left arm   Patient Position: Sitting   Cuff Size: Adult   Pulse: 68   Resp: 14   Temp: 96.3 °F (35.7 °C)   TempSrc: Temporal   SpO2: 93%   Weight: 81.6 kg (180 lb)   Height: 165.1 cm (65\")        Constitutional:  Well developed, well nourished, no acute distress, non-toxic appearance   Eyes:  PERRL, conjunctiva normal   HENT:  Atraumatic, external ears normal, nose normal, oropharynx moist, no pharyngeal exudates. mallampatti   Neck- normal range of motion, no tenderness, supple   Respiratory:  No respiratory distress, normal breath sounds, no rales, no wheezing   Cardiovascular:  Normal rate, normal rhythm, no murmurs, no gallops, no rubs   GI:  Soft, nondistended, normal bowel sounds, nontender, no organomegaly, no mass, no rebound, no guarding   :  No costovertebral angle tenderness   Musculoskeletal:  No edema, no tenderness, no deformities. Back- no tenderness  Integument:  Well hydrated, no rash   Lymphatic:  No lymphadenopathy noted   Neurologic:  Alert & oriented x 3, CN 2-12 normal, normal motor function, normal sensory function, no focal deficits noted   Psychiatric:  Speech and behavior appropriate     XR Chest 2 View    Result Date: 3/18/2021   1. Ill-defined interstitial and alveolar disease changes scattered in both lungs may reflect changes of atypical pneumonia and/or pulmonary fibrosis. 2. Mediastinal and hilar adenopathy described on recent CT chest is not well visualized on today's plain film examination.  Electronically Signed By-Cecilia Velasco MD On:3/18/2021 1:42 PM This " report was finalized on [id number] by  Cecilia Velasco MD.    CT Chest With Contrast Diagnostic    Result Date: 5/6/2021   1. Significant interval worsening of interstitial, reticular nodular, and groundglass densities in the right lung, particularly in the right upper lobe, since 3/1/2021. FINDINGS are worrisome for progressive lymphangitic spread of tumor. 2. Osteoblastic metastatic disease is not thought to be significantly changed. 3. Calcified or partially calcified mediastinal adenopathy, thought to represent nica metastatic disease, is unchanged. Surgical changes of the transverse colon. Ventral abdominal hernia containing portion of the colon without evidence of incarceration or obstruction.    Electronically Signed By-Cecilia Velasco MD On:5/6/2021 1:32 PM This report was finalized on [id number] by  Cecilia Velasco MD.    MRI Brain With & Without Contrast    Result Date: 3/10/2021    1.  There are metastases within the left parietal lobe, left cerebellum, and left frontal lobe which are stable to slightly decreased in size from prior study as detailed above.  No new or enlarging intracranial metastatic lesion identified. 2.  No evidence of infarct or hemorrhage.  Electronically Signed By-Reid Nelson MD On:3/10/2021 5:58 PM This report was finalized on [id number] by  Reid Nelson MD.    CT Abdomen Pelvis With Contrast    Result Date: 5/11/2021   1. Osteosclerotic metastatic disease appears progressed in comparison to 3/1/2021. 2. Multiple soft tissue metastatic nodular implants within the abdomen or pelvis are stable to slightly increased in comparison to 3/1/2021. No new soft tissue nodular implants are identified. 3. Reticular nodular interstitial thickening in the lung bases, suspicious for lymphangitic spread of tumor, appears stable since 5/6/2021. 5. Additional chronic findings as described above.    Electronically Signed By-Cecilia Velasco MD On:5/11/2021 9:15 AM This report was  finalized on [id number] by  Cecilia Velasco MD.     Results for orders placed during the hospital encounter of 03/04/21    Adult Transthoracic Echo Complete W/ Cont if Necessary Per Protocol    Interpretation Summary  · Estimated left ventricular EF = 60% Left ventricular systolic function is normal.    Indications  Pericardial effusion seen on CT scan.    Technically satisfactory study.  Mitral valve is structurally normal.  Tricuspid valve is structurally normal.  Aortic valve is structurally normal.  Pulmonic valve could not be well visualized.  No evidence for mitral tricuspid or aortic regurgitation is seen by Doppler study.  Left atrium is normal in size.  Right atrium is normal in size.  Left ventricle is normal in size and contractility with ejection fraction of 60%.  Right ventricle is normal in size.  Atrial septum is intact.  Aorta is normal.  No pericardial effusion or intracardiac thrombus is seen.    Impression  Structurally and functionally normal cardiac valves.  Left ventricular size and contractility is normal with ejection fraction of 60%.         Assessment     Dyspnea on exertion  Persistent cough  Chronic bronchitis,     CT chest 5/5/2021  Significant interval worsening of interstitial, reticular nodular,  and groundglass densities in the right lung, particularly in the right  upper lobe, since 3/1/2021. FINDINGS are worrisome for progressive  lymphangitic spread of tumor    Bronchoscopy 02/10/2021  Mild inflammatory changes were noted  Diagnostic BAL right lower lobe was done  COVID-19 test negative on 2/9/2021  History of ovarian cancer with metastasis     Plan     Sample of Trelegy inhaler  Theophylline 300 mg daily for persistent cough and wheezing on exam    Consider repeat bronchoscopy if cough and breathing symptoms does not improve                   This document has been electronically signed by  Jerica Esparza MD  15:25 EDT

## 2021-06-04 NOTE — PROGRESS NOTES
MTM Note: Etoposide    Called Tahira 6/3/21 at 1530 to check on nausea.  Pt said that taking the ondansetron has helped the nausea and her biggest problem know is Sob and cough.  Pt reports that neither of these symptoms are new and have not worsened but are now affecting her sleep.  She asked about Hycodan use that her pharmacist recommended.  DDI report provided to Dr. Ball's nurse for her review.  RN said she would call Tahira regarding medication after discussing with Dr. Ball.  INR check today due to DDI with warfarin and etoposide.

## 2021-06-04 NOTE — PROGRESS NOTES
Pt's INR is 1.3 today.  Pt is currently taking 5 mg daily.  Instructed pt to increase to 6 mg daily and recheck on Tuesday 6/8.  Pt is taking an oral chemo and Dr Ball would like for pt to check INR twice weekly for now.  Pt also scheduled for Friday 6/11.

## 2021-06-08 NOTE — TELEPHONE ENCOUNTER
Pt's daughter Pati called back and verbalized understanding of pt needing INR's weekly. She also verbalized understanding of appointment this Friday.

## 2021-06-08 NOTE — TELEPHONE ENCOUNTER
I called pt to discuss her INR schedule due to Dr. Ball wanting pt to have INR checks twice weekly. When seeing pt today in office I had scheduled her out for 1 month. No answer voicemail left for pt to return call to office.

## 2021-06-11 NOTE — PROGRESS NOTES
Pt's INR today is 3.3.  Pt is currently taking 6 mg daily.  Pt is also still taking the oral chemo.  Instructed pt to decrease to 5.5 mg daily and we will recheck her INR on Tuesday 6/15 at her MD f/u.  At that time she can discuss w/ Dr Ball if she still needs to be checked twice weekly.  She v/u.

## 2021-06-13 PROBLEM — R07.9 CHEST PAIN: Status: ACTIVE | Noted: 2021-01-01

## 2021-06-14 PROBLEM — R79.1 ELEVATED INTERNATIONAL NORMALIZED RATIO (INR) DUE TO PRIOR ANTICOAGULANT MEDICATION INGESTION: Status: ACTIVE | Noted: 2021-01-01

## 2021-06-14 PROBLEM — T17.500A MUCUS PLUGGING OF BRONCHI: Status: ACTIVE | Noted: 2021-01-01

## 2021-06-14 NOTE — PLAN OF CARE
Goal Outcome Evaluation:  Plan of Care Reviewed With: patient        Progress: no change  Outcome Summary: Patient admitted for chest pain, currently resting w/o pain. no new complaints at this time. NPO for pulm consult in am. On 2l o2 NC per NP and stating high 90s. pt received one bag of vit k d/t elevated pt/inr lvls, waiting redraw, pt reminded to turn.

## 2021-06-14 NOTE — CONSULTS
Group: [facility]         CONSULT NOTE    Patient Identification:  Tahira Zimmerman  65 y.o.  female  1955  [id number]            Requesting physician: Attending physician    Reason for Consultation: Bronchoscopy      History of Present Illness:    65 year old female who presented to ED on 6/13/21 for complaints of chest pain that radiates to right side of chest. She has past medical history of ovarian CA with metastasis to bone and brain currently on oral chemotherapy, HLD, HTN, DVT, obesity, depression, MELANI, RLS, and fibromatosis.     In the ED vitals temp 98.3, HR 54, RR 18, /81, O2 sats 100% on room air. Troponin less than 0.010, glucose 96, , K4.4, CO2 24, creatinine 1.11, BUN 20, alkaline phosphatase 121, ALT 6, AST 15, total bili 0.3, INR 7.54, PTT 75.3, PTT 54.8, WBC 4.0, Hgb 9.9, platelets 172, neutrophils 74.9.  EKG shows sinus bradycardia rate 53   Chest x-ray shows left subclavian Port-A-Cath in place, heart size at the upper limits of normal, ill-defined right upper lobe airspace disease suspicious for pneumonia, hazy bibasilar airspace disease similar to prior study likely due to chronic lung disease.  CT for PE protocol shows interval progression of extensive osseous metastatic disease. Findings are consistent with progression of metastatic neoplasm. Worsening asymmetric interlobular septal thickening throughout the right lung with associated reticulonodular and miliary type nodular airspace disease. 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 This has worsened. Similar  findings are identified in the left lower lobe as well. Mediastinal and subcarinal adenopathy. This is felt to be metastatic and is essentially stable given differences in technique. Bronchial wall thickening. There are subtle areas of mucous plugging involving multiple bronchioles to the right lower lobe. Correlate for bronchiolitis, reactive airways lung disease or possibly  aspiration. Small hiatal hernia. No pulmonary embolus.  Patient treated in the ED with 5 mg p.o. vitamin K, 500 NS bolus, IV Dilaudid.    Assessment:    INR down to 1.51    Dyspnea on exertion  Persistent cough  Chronic bronchitis,      CT chest 5/5/2021  Significant interval worsening of interstitial, reticular nodular,  and groundglass densities in the right lung, particularly in the right  upper lobe, since 3/1/2021. FINDINGS are worrisome for progressive  lymphangitic spread of tumor     Bronchoscopy 02/10/2021  Mild inflammatory changes were noted  Diagnostic BAL right lower lobe was done  COVID-19 test negative on 2/9/2021  History of ovarian cancer with metastasis     Plan     Bronchoscopy tomorrow will be scheduled for persistent cough and micronodular lung disease, tree-in-bud, groundglass alveolitis discussed with patient    Awaiting COVID-19 test    Currently on mucomyst nebs/duo-nebs  Solumederol 40 mg IV          Review of Sytems:  Review of Systems   Constitutional: Positive for activity change and fatigue.   Respiratory: Positive for cough and shortness of breath.        Past Medical History:  Past Medical History:   Diagnosis Date   • Anemia 2013   • Cervical disc disorder 2013    Herniated disc, pinched nerve   • Clotting disorder (CMS/HCC) 2013    Low platelets from chemo   • Colon polyp 2013   • Deep vein thrombosis (CMS/HCC) 2013   • Diverticulosis 2013   • GERD (gastroesophageal reflux disease) 2016   • HL (hearing loss) 2016    I need hearing aids   • Hyperlipidemia 2013    Need my cholesterol rechecked   • Hypertension    • Joint pain 2013    Shoulder pain, torn rotator cuff   • Low back pain 2013   • Neuromuscular disorder (CMS/HCC) 2015    Shingles, pinched nerves in my neck   • Osteopenia 2014   • Osteoporosis    • Ovarian cancer (CMS/HCC)     ovarian--  spread to lungs 10/2020   • Ovarian cancer on left (CMS/HCC) 1995   • Pneumonia 2010    Have had it several times   • Spinal stenosis 2013     Cervical   • Urinary tract infection 2013    Have had several utis       Past Surgical History:  Past Surgical History:   Procedure Laterality Date   • BRONCHOSCOPY N/A 2/10/2021    Procedure: BRONCHOSCOPY with bronchioalveolar lavage;  Surgeon: Jerica Esparza MD;  Location: [facility] ENDOSCOPY;  Service: Pulmonary;  Laterality: N/A;  post op:right lobe pneumonia   • CATARACT EXTRACTION     • COLON SURGERY     • COLONOSCOPY  05/26/2018   • EXPLORATORY LAPAROTOMY     • HERNIA REPAIR     • HYSTERECTOMY     • OOPHORECTOMY          Home Meds:  Facility-Administered Medications Prior to Admission   Medication Dose Route Frequency Provider Last Rate Last Admin   • theophylline (PEG-24) 24 hr capsule 300 mg  300 mg Oral Q24H Jerica Esparza MD         Medications Prior to Admission   Medication Sig Dispense Refill Last Dose   • acetaminophen (TYLENOL) 500 MG tablet Take 1,000 mg by mouth Every 6 (Six) Hours As Needed for Mild Pain .      • albuterol sulfate  (90 Base) MCG/ACT inhaler Inhale 2 puffs Every 4 (Four) Hours As Needed for Wheezing. 18 g 0    • atorvastatin (LIPITOR) 20 MG tablet Take 20 mg by mouth every night at bedtime.  3    • calcium carbonate (Calcium 600) 600 MG tablet Take 1 tablet by mouth 2 (two) times a day. 60 tablet 11    • cetirizine (zyrTEC) 10 MG tablet Take 1 tablet by mouth Every Night. 30 tablet 0    • cyanocobalamin 1000 MCG/ML injection Inject 1,000 mcg into the appropriate muscle as directed by prescriber Every 28 (Twenty-Eight) Days.      • dexamethasone (DECADRON) 4 MG tablet Take 2 tablets in the morning daily on days 2, 3 & 4.  Take with food. 6 tablet 5    • etoposide (TOPOSAR;VEPESID) 50 MG chemo capsule Take 1 capsule (50mg) by mouth every other day, alternate with 2 caps (100mg) every other day for 21 days, off 7 days.  Swallow whole. Refrigerate. 31 capsule 2    • famotidine (PEPCID) 40 MG tablet TAKE 1 TABLET BY MOUTH EVERY MORNING BEFORE BREAKFAST 90 tablet 1    • folic acid  (FOLVITE) 1 MG tablet Take 1,000 mcg by mouth Daily.      • HYDROcodone-homatropine (HYCODAN) 5-1.5 MG/5ML syrup Take 5 mL by mouth 2 (Two) Times a Day As Needed for Cough. 300 mL 0    • MAGnesium-Oxide 400 (241.3 Mg) MG tablet tablet Take 400 mg by mouth 2 (Two) Times a Day.      • montelukast (SINGULAIR) 10 MG tablet TAKE 1 TABLET BY MOUTH EVERY NIGHT 30 tablet 2    • ondansetron (ZOFRAN) 8 MG tablet Take 1 tablet by mouth 3 (Three) Times a Day As Needed for Nausea or Vomiting. 30 tablet 5    • oxyCODONE (ROXICODONE) 10 MG tablet Take 1 tablet by mouth Every 4 (Four) Hours As Needed for Moderate Pain . 180 tablet 0    • potassium chloride (K-DUR,KLOR-CON) 20 MEQ CR tablet Take 2 tablets by mouth Daily. 60 tablet 3    • pregabalin (LYRICA) 100 MG capsule Take 1 capsule by mouth 3 (Three) Times a Day. 90 capsule 2    • sertraline (ZOLOFT) 50 MG tablet Take 50 mg by mouth Every Night.      • temazepam (RESTORIL) 30 MG capsule TAKE 1 CAPSULE BY MOUTH AT NIGHT AS NEEDED FOR SLEEP 30 capsule 1    • triamterene-hydrochlorothiazide (DYAZIDE) 37.5-25 MG per capsule TAKE 1 CAPSULE BY MOUTH EVERY DAY 90 capsule 1    • warfarin (COUMADIN) 1 MG tablet Take 1 tablet by mouth Daily. At 1700 as directed 30 tablet 3        Allergies:  Allergies   Allergen Reactions   • Morphine Nausea Only and Hives   • Penicillin V Potassium Rash   • Sulfa Antibiotics Rash   • Levaquin [Levofloxacin] Nausea Only and Dizziness     When taken with Coumadin   • Amoxicillin Rash       Social History:   Social History     Socioeconomic History   • Marital status:      Spouse name: Not on file   • Number of children: Not on file   • Years of education: Not on file   • Highest education level: Not on file   Tobacco Use   • Smoking status: Never Smoker   • Smokeless tobacco: Never Used   Vaping Use   • Vaping Use: Never used   Substance and Sexual Activity   • Alcohol use: No     Comment: caffeine 32-64oz qd   • Drug use: No   • Sexual activity:  "Not Currently     Partners: Male     Birth control/protection: Surgical       Family History:  Family History   Problem Relation Age of Onset   • Hypertension Mother    • Cancer Father    • Hypertension Father    • Hypertension Sister    • Hypertension Brother    • Stroke Brother        Physical Exam:  /68 (BP Location: Right arm, Patient Position: Lying)   Pulse 61   Temp 97.5 °F (36.4 °C) (Oral)   Resp 16   Ht 165.1 cm (65\")   Wt 79.4 kg (175 lb)   LMP  (LMP Unknown)   SpO2 96%   BMI 29.12 kg/m²  Body mass index is 29.12 kg/m². 96% 79.4 kg (175 lb)  Physical Exam  Vitals reviewed.   Pulmonary:      Breath sounds: Rhonchi present.   Skin:     General: Skin is warm and dry.   Neurological:      Mental Status: She is alert and oriented to person, place, and time.         LABS:  Lab Results   Component Value Date    CALCIUM 8.6 06/14/2021    PHOS 2.2 (L) 05/07/2021     Results from last 7 days   Lab Units 06/14/21 0410 06/13/21 1930   MAGNESIUM mg/dL 1.7  --    SODIUM mmol/L 136 135*   POTASSIUM mmol/L 3.8 4.4   CHLORIDE mmol/L 100 98   CO2 mmol/L 25.0 24.0   BUN mg/dL 20 20   CREATININE mg/dL 1.06* 1.11*   GLUCOSE mg/dL 132* 96   CALCIUM mg/dL 8.6 8.9   WBC 10*3/mm3 3.60 4.00   HEMOGLOBIN g/dL 9.7* 9.9*   PLATELETS 10*3/mm3 136* 172   ALT (SGPT) U/L  --  6   AST (SGOT) U/L  --  15     Lab Results   Component Value Date    CKTOTAL 57 12/08/2019    TROPONINI <0.012 07/03/2020    TROPONINT <0.010 06/14/2021     Results from last 7 days   Lab Units 06/14/21  0007 06/13/21 1930   TROPONIN T ng/mL <0.010 <0.010                     Results from last 7 days   Lab Units 06/14/21  0410 06/14/21  0007 06/13/21  1930 06/11/21  0754 06/08/21  0815   INR  4.30* >8.00* 7.54* 3.30* 2.40*         Lab Results   Component Value Date    TSH 0.543 10/01/2020     Estimated Creatinine Clearance: 55.1 mL/min (A) (by C-G formula based on SCr of 1.06 mg/dL (H)).         Imaging:  Imaging Results (Last 24 Hours)     Procedure " Component Value Units Date/Time    CT Chest Pulmonary Embolism [[id number]] Collected: 06/13/21 2044     Updated: 06/13/21 2051    Narrative:      Exam: CTA thorax, PE protocol    Date: June 13, 2021    History: Chest pain, shortness of breath    Comparison: May 6, 2021    Technique: Contiguous axial CT images obtained of the thorax following the uneventful intravenous administration of 91 mL Isovue-370 contrast. Additionally, sagittal, coronal and 3-D reformatted images were obtained. CT dose lowering techniques were   used, to include: automated exposure control, adjustment for patient size, and or use of iterative reconstruction.    Findings:    Thoracic aorta: There is no aneurysm or dissection.    HEART: The heart size is prominent.    Pulmonary arteries: The pulmonary arteries are well visualized. There is no filling defect to suggest an acute pulmonary embolus.    Mediastinum: There is an Knxakk-c-Febl catheter in the left chest. There is redemonstration of mediastinal and subcarinal adenopathy. A precarinal lymph node measures 1.4 cm in short axis. These lymph nodes are essentially stable. There is a small hiatal   hernia.    Lung parenchyma: There is asymmetric interlobular septal thickening throughout the right lung. There is redemonstration of reticulonodular and miliary type nodular airspace disease throughout the right lung. Similar findings identified in the left lower   lobe. There is a stable 6 mm pulmonary nodule in the left lower lobe. There is bronchial wall thickening. There are subtle areas of mucous plugging involving multiple bronchioles to the right lower lobe. There is no pneumothorax or sizable pleural fluid   collection.    Upper abdomen: Unremarkable.    Osseous structures: There is extensive osseous metastatic disease which appears to have progressed in the interim. There are moderate degenerative changes throughout the spine. No pathologic fractures are identified.      Impression:       1. Interval progression of extensive osseous metastatic disease. Findings are consistent with progression of metastatic neoplasm.  2. Worsening asymmetric interlobular septal thickening throughout the right lung with associated reticulonodular and miliary type nodular airspace disease. 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 This has worsened. Similar   findings are identified in the left lower lobe as well.  3. Mediastinal and subcarinal adenopathy. This is felt to be metastatic and is essentially stable given differences in technique.  4. Bronchial wall thickening. There are subtle areas of mucous plugging involving multiple bronchioles to the right lower lobe. Correlate for bronchiolitis, reactive airways lung disease or possibly aspiration.  5. Small hiatal hernia.  6. No pulmonary embolus.      Slot 63    Electronically signed by:  Reyes Agarwal M.D.    6/13/2021 6:50 PM    XR Chest 1 View [[id number]] Collected: 06/13/21 2009     Updated: 06/13/21 2013    Narrative:      XR CHEST 1 VW-     Date of Exam: 6/13/2021 7:50 PM     Indication: cp.     Comparison: 03/18/2021     Technique: A single view of the chest was obtained.     FINDINGS:      Left subclavian Port-A-Cath remains in place unchanged in position.   Heart size is at the upper limits of normal.  Pulmonary vessels are  normal.  There is ill-defined right upper lobe airspace disease  suspicious for pneumonia.  There is some hazy bibasilar airspace disease  similar prior study likely due to chronic lung disease.             Impression:            1.  New hazy right upper lobe airspace disease suspicious for pneumonia.  2.  No change in hazy bibasilar airspace disease likely due to chronic  lung disease.        Electronically Signed By-Reid Nelson MD On:6/13/2021 8:11 PM  This report was finalized on [id number] by  Reid Nelson MD.            Current Meds:   SCHEDULE  acetylcysteine, 2 mL, Nebulization, BID -  RT  atorvastatin, 20 mg, Oral, Daily  calcium 500 mg vitamin D 5 mcg (200 UT), 1 tablet, Oral, BID  cetirizine, 10 mg, Oral, Nightly  folic acid, 1,000 mcg, Oral, Daily  ipratropium-albuterol, 3 mL, Nebulization, Q4H - RT  magnesium oxide, 400 mg, Oral, BID  methylPREDNISolone sodium succinate, 40 mg, Intravenous, Q8H  montelukast, 10 mg, Oral, Nightly  pantoprazole, 40 mg, Oral, BID AC  potassium chloride, 40 mEq, Oral, Daily  pregabalin, 100 mg, Oral, TID  sertraline, 50 mg, Oral, Nightly  sodium chloride, 10 mL, Intravenous, Q12H  theophylline, 300 mg, Oral, Q24H  triamterene-hydrochlorothiazide, 1 tablet, Oral, Daily      Infusions  Pharmacy to dose warfarin,   sodium chloride, 100 mL/hr, Last Rate: 100 mL/hr (06/14/21 0045)      PRNs  •  acetaminophen **OR** acetaminophen **OR** acetaminophen  •  albuterol  •  aluminum-magnesium hydroxide-simethicone  •  HYDROcodone-homatropine  •  magnesium sulfate **OR** magnesium sulfate in D5W 1g/100mL (PREMIX)  •  melatonin  •  nitroglycerin  •  ondansetron **OR** ondansetron  •  oxyCODONE  •  Pharmacy to dose warfarin  •  potassium chloride  •  sodium chloride  •  [COMPLETED] Insert peripheral IV **AND** sodium chloride  •  temazepam        I reviewed the patient's new clinical results.    Electronically signed by IRENA Amador, 06/14/21 at 06:52 EDT.

## 2021-06-14 NOTE — CONSULTS
Palliative Care Consultation    Patient Name: Tahira Zimmerman  : 1955  MRN: [id number]  Allergies: Morphine, Penicillin v potassium, Sulfa antibiotics, Levaquin [levofloxacin], and Amoxicillin    Requesting clinician:  Odilia  Reason for consult: Consultation for clarification of goals of care and code status.      Patient Code Status:   Code Status and Medical Interventions:   Ordered at: 21 2312     Level Of Support Discussed With:    Patient     Code Status:    CPR     Medical Interventions (Level of Support Prior to Arrest):    Full           Advanced Care Planning    Advanced Directives: Patient does not have advance directive  Health Care Directive on file: No  Health Care Surrogate:      Palliative Performance Scale Score:    Comments:         Chief Complaint:    Chest pain    History of Present Illness    Tahira Zimmerman is a 65 y.o. female who presented to [facility] on  with complaints of chest pain. Patient started pain was a 10/10, midsternal, and radiating to right chest. It started while she was doing laundry. Nothing made it better or worse. She also reported cough, shortness of breath, and congestion. She denied fever, chills, nausea, or vomiting.     In ED: vitals temp 98.3, HR 54, RR 18, /81, O2 sats 100% on room air.  Troponin less than 0.010, glucose 96, , K4.4, CO2 24, creatinine 1.11, BUN 20, alkaline phosphatase 121, ALT 6, AST 15, total bili 0.3, INR 7.54, PTT 75.3, PTT 54.8, WBC 4.0, Hgb 9.9, platelets 172, neutrophils 74.9.      EKG shows sinus bradycardia rate 53 when compared with EKG of 2021 significant repolarization change.        Chest x-ray shows left subclavian Port-A-Cath in place, heart size at the upper limits of normal, ill-defined right upper lobe airspace disease suspicious for pneumonia, hazy bibasilar airspace disease similar to prior study likely due to chronic lung disease.    CT for PE protocol shows interval progression of extensive  osseous metastatic disease. Findings are consistent with progression of metastatic neoplasm. Worsening asymmetric interlobular septal thickening throughout the right lung with associated reticulonodular and miliary type nodular airspace disease. 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 This has worsened. Similar  findings are identified in the left lower lobe as well. Mediastinal and subcarinal adenopathy. This is felt to be metastatic and is essentially stable given differences in technique.     6/14 Palliative consult for goals of care discussion.    VITAL SIGNS:   Temp:  [97.5 °F (36.4 °C)-98.3 °F (36.8 °C)] 97.5 °F (36.4 °C)  Heart Rate:  [51-61] 59  Resp:  [16-18] 18  BP: (112-124)/(68-81) 112/68       PMH: HLD, HTN, DVT, MELANI, Depression, RLS     Past Surgical History:   Procedure Laterality Date   • BRONCHOSCOPY N/A 2/10/2021    Procedure: BRONCHOSCOPY with bronchioalveolar lavage;  Surgeon: Jerica Esparza MD;  Location: [facility] ENDOSCOPY;  Service: Pulmonary;  Laterality: N/A;  post op:right lobe pneumonia   • CATARACT EXTRACTION     • COLON SURGERY     • COLONOSCOPY  05/26/2018   • EXPLORATORY LAPAROTOMY     • HERNIA REPAIR     • HYSTERECTOMY     • OOPHORECTOMY         Family History   Problem Relation Age of Onset   • Hypertension Mother    • Cancer Father    • Hypertension Father    • Hypertension Sister    • Hypertension Brother    • Stroke Brother        Social History     Tobacco Use   • Smoking status: Never Smoker   • Smokeless tobacco: Never Used   Vaping Use   • Vaping Use: Never used   Substance Use Topics   • Alcohol use: No     Comment: caffeine 32-64oz qd   • Drug use: No           LABS:    Results from last 7 days   Lab Units 06/14/21  0410   WBC 10*3/mm3 3.60   HEMOGLOBIN g/dL 9.7*   HEMATOCRIT % 29.3*   PLATELETS 10*3/mm3 136*     Results from last 7 days   Lab Units 06/14/21  0410   SODIUM mmol/L 136   POTASSIUM mmol/L 3.8   CHLORIDE mmol/L 100   CO2 mmol/L 25.0   BUN  mg/dL 20   CREATININE mg/dL 1.06*   GLUCOSE mg/dL 132*   CALCIUM mg/dL 8.6     Results from last 7 days   Lab Units 06/14/21  0410 06/13/21  1930   SODIUM mmol/L 136 135*   POTASSIUM mmol/L 3.8 4.4   CHLORIDE mmol/L 100 98   CO2 mmol/L 25.0 24.0   BUN mg/dL 20 20   CREATININE mg/dL 1.06* 1.11*   CALCIUM mg/dL 8.6 8.9   BILIRUBIN mg/dL  --  0.3   ALK PHOS U/L  --  121*   ALT (SGPT) U/L  --  6   AST (SGOT) U/L  --  15   GLUCOSE mg/dL 132* 96         IMAGING STUDIES:  XR Chest 1 View    Result Date: 6/13/2021    1.  New hazy right upper lobe airspace disease suspicious for pneumonia. 2.  No change in hazy bibasilar airspace disease likely due to chronic lung disease.   Electronically Signed By-Reid Nelson MD On:6/13/2021 8:11 PM This report was finalized on [id number] by  Reid Nelson MD.    CT Chest Pulmonary Embolism    Result Date: 6/13/2021  1. Interval progression of extensive osseous metastatic disease. Findings are consistent with progression of metastatic neoplasm. 2. Worsening asymmetric interlobular septal thickening throughout the right lung with associated reticulonodular and miliary type nodular airspace disease. 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 This has worsened. Similar findings are identified in the left lower lobe as well. 3. Mediastinal and subcarinal adenopathy. This is felt to be metastatic and is essentially stable given differences in technique. 4. Bronchial wall thickening. There are subtle areas of mucous plugging involving multiple bronchioles to the right lower lobe. Correlate for bronchiolitis, reactive airways lung disease or possibly aspiration. 5. Small hiatal hernia. 6. No pulmonary embolus. Slot 63 Electronically signed by:  Reyes Agarwal M.D.  6/13/2021 6:50 PM        I reviewed the patient's new clinical results including labs, imaging, and vitals.        Scheduled Meds:  acetylcysteine, 2 mL, Nebulization, BID - RT  atorvastatin, 20 mg,  Oral, Daily  calcium 500 mg vitamin D 5 mcg (200 UT), 1 tablet, Oral, BID  cetirizine, 10 mg, Oral, Nightly  folic acid, 1,000 mcg, Oral, Daily  ipratropium-albuterol, 3 mL, Nebulization, Q4H - RT  magnesium oxide, 400 mg, Oral, BID  methylPREDNISolone sodium succinate, 40 mg, Intravenous, Q8H  montelukast, 10 mg, Oral, Nightly  pantoprazole, 40 mg, Oral, BID AC  phytonadione (VITAMIN K) IVPB, 2.5 mg, Intravenous, Once  potassium chloride, 40 mEq, Oral, Daily  pregabalin, 100 mg, Oral, TID  sertraline, 50 mg, Oral, Nightly  sodium chloride, 10 mL, Intravenous, Q12H  theophylline, 300 mg, Oral, Q24H  triamterene-hydrochlorothiazide, 1 tablet, Oral, Daily      Continuous Infusions:  Pharmacy to dose warfarin,   sodium chloride, 100 mL/hr, Last Rate: 100 mL/hr (06/14/21 0045)          Review of Systems   Constitutional: Positive for fatigue.   Respiratory: Positive for shortness of breath.    All other systems reviewed and are negative.        Physical Exam  Vitals and nursing note reviewed.   Constitutional:       Appearance: Normal appearance.   HENT:      Head: Normocephalic and atraumatic.      Nose: Nose normal.      Mouth/Throat:      Mouth: Mucous membranes are dry.      Pharynx: Oropharynx is clear.   Eyes:      Extraocular Movements: Extraocular movements intact.      Conjunctiva/sclera: Conjunctivae normal.      Pupils: Pupils are equal, round, and reactive to light.   Cardiovascular:      Rate and Rhythm: Normal rate.      Pulses: Normal pulses.   Pulmonary:      Effort: Pulmonary effort is normal.   Abdominal:      General: Abdomen is flat.   Musculoskeletal:         General: Normal range of motion.      Cervical back: Normal range of motion.   Skin:     General: Skin is dry.   Neurological:      General: No focal deficit present.      Mental Status: She is alert and oriented to person, place, and time. Mental status is at baseline.   Psychiatric:         Mood and Affect: Mood normal.             PROBLEM  LIST:    Chest pain    Malignant neoplasm of right ovary (CMS/HCC)    Long term (current) use of anticoagulants    Pain of metastatic malignancy    Hyperlipidemia    Essential hypertension    Fibromyositis    Anxiety    Brain metastases (CMS/HCC)    Insomnia    Lung nodule    Osteoporosis    History of DVT (deep vein thrombosis)    Obesity (BMI 30-39.9)    Depression    Elevated international normalized ratio (INR) due to prior anticoagulant medication ingestion    Mucus plugging of bronchi          ASSESSMENT/PLAN:    Goals of care: Met with patient this am to discuss goals of care and overall clinical status. Patient resting in bed, appears comfortable. We discussed her progression of cancer and her quality of life. She says that Dr. Ball has offered her another chemotherapy option at this time. She says that she is still able to get around and do the things that are important to her and at this time, she would like to pursue active treatment options. We talked about comfort measures and she said if her cancer does not respond to this most recent option offered, than she would consider hospice. We discussed advanced directives. Patient does not have a living will or POA in place. She lives at home with her daughter and states that she would want her daughter to be her medical decision maker if she wasn't able to make decisions for herself. We talked through a living will. She says she would like to wait until her daughter was present before completing the documents. She states at this time she would like to remain a full code and needs more time to think about her code status before making any changes.   Chest pain: Troponin negative. CT for PE protocol shows interval progression of extensive osseous metastatic disease. Findings are consistent with progression of metastatic neoplasm. Worsening asymmetric interlobular septal thickening throughout the right lung with associated reticulonodular and miliary type  nodular airspace disease. 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 This has worsened. Similar  findings are identified in the left lower lobe as well. Mediastinal and subcarinal adenopathy. This is felt to be metastatic and is essentially stable given differences in technique. Bronchial wall thickening. There are subtle areas of mucous plugging involving multiple bronchioles to the right lower lobe. Correlate for bronchiolitis, reactive airways lung disease or possibly aspiration. EKG shows sinus bradycardia rate 53 when compared with EKG of 03/18/2021 significant repolarization change. Trending troponins.   Elevated INR: Upon arrival to the ED INR 7.54. Patient treated in ED with 5 mg p.o. vitamin K.  Mucus plugging: Pulmonary consulted for possible bronch. Follows with Dr. Esparza for lung mets.   Ovarian Ca: Patient current on oral chemo with Dr. Ball.   Chronic pain/HTN/Anxiety/DVT hx/Osteoporsis: Chronic conditions per primary.       Decisional Capacity: yes  Patient's understanding of illness: comprehensive  Patient goals of care:  Full code, full intervention      Thank you for this consult and allowing us to participate in patient's plan of care. Palliative Care Team will continue to follow patient.     Time spent: 62 minutes spent reviewing medical and medication records, assessing and examining patient, discussing with family, answering questions, providing some guidance about a plan and documentation of care, and coordinating care with other healthcare members, with > 50% time spent face to face.         Adriana Caal, APRN  6/14/2021

## 2021-06-14 NOTE — PROGRESS NOTES
"Pharmacy dosing service  Anticoagulant  Warfarin     Subjective:    Tahira Zimmerman is a 65 y.o.female being continued on warfarin for DVT.    INR Goal: 2 - 3  Home medication?: Yes, warfarin 1 mg PO every day at 1800 - will need to verify dose. Per chart review orders for 5mg daily and 1mg daily  Bridge Therapy Present?:  No  Interacting Medications Evaluation (New/Present/Discontinued): etoposide (treatment plan), sertraline  Additional Contributing Factors: Hx DVT/PE, has onco therapy plan including etoposide      Assessment/Plan:    INR supratherapeutic at time of arrival. Pt given 5mg PO vitamin K. Will hold warfarin for now.     Continue to monitor and adjust based on INR.         Date 6/13           INR 7.54           Dose Vit K 5mg PO x1               Objective:  [Ht: 165.1 cm (65\"); Wt: 81.6 kg (180 lb); BMI: Body mass index is 29.95 kg/m².]    Lab Results   Component Value Date    ALBUMIN 3.70 06/13/2021     Lab Results   Component Value Date    INR 7.54 (C) 06/13/2021    INR 3.30 (H) 06/11/2021    INR 2.40 (H) 06/08/2021    PROTIME 75.3 (H) 06/13/2021    PROTIME 20.8 03/18/2021    PROTIME 30.2 (H) 02/25/2021     Lab Results   Component Value Date    HGB 9.9 (L) 06/13/2021    HGB 10.8 (L) 05/26/2021    HGB 11.0 (L) 05/21/2021     Lab Results   Component Value Date    HCT 29.4 (L) 06/13/2021    HCT 35.8 05/26/2021    HCT 36.2 05/21/2021       Marietta Calderon RPH  06/13/21 23:09 EDT     "

## 2021-06-14 NOTE — H&P
[facility]      Patient Name: Tahira Zimmerman  : 1955  MRN: [id number]  Primary Care Physician: Noemy Queen MD  Date of admission: 2021    Patient Care Team:  Noemy Queen MD as PCP - General (Family Medicine)  Cindy Ball MD as Consulting Physician (Hematology and Oncology)          Subjective   History Present Illness     Chief Complaint:   Chief Complaint   Patient presents with   • Chest Pain       Chief complaint: Chest pain         History of present illness:    Ms. Zimmerman is a 65 y.o. w/PMH of ovarian CA with metastasis to bone and brain currently on oral chemotherapy, HLD, HTN, DVT, obesity, depression, MELANI, RLS, fibromatosis who presents to [facility] ED due to chest pain. Patient states 10/10 sharp midsternal chest pain with radiation to right chest wall began this evening while doing laundry, no aggravating or alleviating factors noted.  Patient admits to cough, dyspnea, congestion, hoarseness, weakness, joint pain.  Patient denies fever, chills, nausea, vomiting, palpitations.  As dyspnea did not improve she decided to go to [facility] ED        On arrival to the ED vitals temp 98.3, HR 54, RR 18, /81, O2 sats 100% on room air.  Troponin less than 0.010, glucose 96, , K4.4, CO2 24, creatinine 1.11, BUN 20, alkaline phosphatase 121, ALT 6, AST 15, total bili 0.3, INR 7.54, PTT 75.3, PTT 54.8, WBC 4.0, Hgb 9.9, platelets 172, neutrophils 74.9.  EKG shows sinus bradycardia rate 53 when compared with EKG of 2021 significant repolarization change.  Chest x-ray shows left subclavian Port-A-Cath in place, heart size at the upper limits of normal, ill-defined right upper lobe airspace disease suspicious for pneumonia, hazy bibasilar airspace disease similar to prior study likely due to chronic lung disease.  CT for PE protocol shows interval progression of extensive osseous metastatic  disease. Findings are consistent with progression of metastatic neoplasm. Worsening asymmetric interlobular septal thickening throughout the right lung with associated reticulonodular and miliary type nodular airspace disease. 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 This has worsened. Similar  findings are identified in the left lower lobe as well. Mediastinal and subcarinal adenopathy. This is felt to be metastatic and is essentially stable given differences in technique. Bronchial wall thickening. There are subtle areas of mucous plugging involving multiple bronchioles to the right lower lobe. Correlate for bronchiolitis, reactive airways lung disease or possibly aspiration. Small hiatal hernia. No pulmonary embolus.  Patient treated in the ED with 5 mg p.o. vitamin K, 500 NS bolus, IV Dilaudid.    History of Present Illness    Review of Systems   Constitutional: Negative for chills and fever.   HENT: Positive for congestion and hoarse voice.    Eyes: Negative.  Negative for visual disturbance.   Cardiovascular: Positive for chest pain, dyspnea on exertion and irregular heartbeat.   Respiratory: Positive for cough, shortness of breath and sleep disturbances due to breathing.    Endocrine: Negative.    Hematologic/Lymphatic: Positive for bleeding problem. Bruises/bleeds easily.   Skin: Positive for dry skin.   Musculoskeletal: Positive for back pain, joint pain, joint swelling, muscle weakness and myalgias.   Gastrointestinal: Negative.    Genitourinary: Negative.    Neurological: Positive for weakness.   Psychiatric/Behavioral: Positive for depression. The patient is nervous/anxious.    Allergic/Immunologic: Negative.    All other systems reviewed and are negative.          Personal History     Past Medical History:   Past Medical History:   Diagnosis Date   • Anemia 2013   • Cervical disc disorder 2013    Herniated disc, pinched nerve   • Clotting disorder (CMS/HCC) 2013    Low  platelets from chemo   • Colon polyp 2013   • Deep vein thrombosis (CMS/[facility]) 2013   • Diverticulosis 2013   • GERD (gastroesophageal reflux disease) 2016   • HL (hearing loss) 2016    I need hearing aids   • Hyperlipidemia 2013    Need my cholesterol rechecked   • Hypertension    • Joint pain 2013    Shoulder pain, torn rotator cuff   • Low back pain 2013   • Neuromuscular disorder (CMS/[facility]) 2015    Shingles, pinched nerves in my neck   • Osteopenia 2014   • Osteoporosis    • Ovarian cancer (CMS/[facility])     ovarian--  spread to lungs 10/2020   • Ovarian cancer on left (CMS/[facility]) 1995   • Pneumonia 2010    Have had it several times   • Spinal stenosis 2013    Cervical   • Urinary tract infection 2013    Have had several utis       Surgical History:      Past Surgical History:   Procedure Laterality Date   • BRONCHOSCOPY N/A 2/10/2021    Procedure: BRONCHOSCOPY with bronchioalveolar lavage;  Surgeon: Jerica Esparza MD;  Location: [facility] ENDOSCOPY;  Service: Pulmonary;  Laterality: N/A;  post op:right lobe pneumonia   • CATARACT EXTRACTION     • COLON SURGERY     • COLONOSCOPY  05/26/2018   • EXPLORATORY LAPAROTOMY     • HERNIA REPAIR     • HYSTERECTOMY     • OOPHORECTOMY         Family History: family history includes Cancer in her father; Hypertension in her brother, father, mother, and sister; Stroke in her brother.    Social History:  reports that she has never smoked. She has never used smokeless tobacco. She reports that she does not drink alcohol and does not use drugs.      Medications:  Prior to Admission medications    Medication Sig Start Date End Date Taking? Authorizing Provider   acetaminophen (TYLENOL) 500 MG tablet Take 1,000 mg by mouth Every 6 (Six) Hours As Needed for Mild Pain .    Provider, MD Ella   albuterol sulfate  (90 Base) MCG/ACT inhaler Inhale 2 puffs Every 4 (Four) Hours As Needed for Wheezing. 1/13/21   Noemy Queen MD   atorvastatin (LIPITOR) 20 MG tablet Take 20 mg by mouth  every night at bedtime. 5/11/19   Ella Andres MD   calcium carbonate (Calcium 600) 600 MG tablet Take 1 tablet by mouth 2 (two) times a day. 2/1/21   Cindy Ball MD   cetirizine (zyrTEC) 10 MG tablet Take 1 tablet by mouth Every Night. 2/12/21   Amberly Gonzalez MD   cyanocobalamin 1000 MCG/ML injection Inject 1,000 mcg into the appropriate muscle as directed by prescriber Every 28 (Twenty-Eight) Days.    Ella Andres MD   dexamethasone (DECADRON) 4 MG tablet Take 2 tablets in the morning daily on days 2, 3 & 4.  Take with food. 3/12/21   Cindy Ball MD   etoposide (TOPOSAR;VEPESID) 50 MG chemo capsule Take 1 capsule (50mg) by mouth every other day, alternate with 2 caps (100mg) every other day for 21 days, off 7 days.  Swallow whole. Refrigerate. 5/16/21   Cindy Ball MD   famotidine (PEPCID) 40 MG tablet TAKE 1 TABLET BY MOUTH EVERY MORNING BEFORE BREAKFAST 3/3/20   Mira Jeffrey APRN   folic acid (FOLVITE) 1 MG tablet Take 1,000 mcg by mouth Daily. 4/11/21   Ella Andres MD   guaiFENesin-codeine (Virtussin A/C) 100-10 MG/5ML liquid Take 5 mL by mouth Every 6 (Six) Hours As Needed for Cough. 5/7/21   Cindy Ball MD   HYDROcodone-homatropine (HYCODAN) 5-1.5 MG/5ML syrup Take 5 mL by mouth 2 (Two) Times a Day As Needed for Cough. 6/4/21   Cindy Ball MD   K Phos [place]-Sod Phos Di & Mono (Virt-Phos 250 Neutral) 155-852-130 MG tablet TAKE 1 TABLET BY MOUTH TWICE DAILY FOR 2 DAYS 3/11/21   Ella Andres MD   MAGnesium-Oxide 400 (241.3 Mg) MG tablet tablet Take 400 mg by mouth 2 (Two) Times a Day. 4/11/21   Ella Andres MD   montelukast (SINGULAIR) 10 MG tablet TAKE 1 TABLET BY MOUTH EVERY NIGHT 5/31/21   Noemy Queen MD   ondansetron (ZOFRAN) 8 MG tablet Take 1 tablet by mouth 3 (Three) Times a Day As Needed for Nausea or Vomiting. 3/12/21   Cindy Ball MD   ondansetron ODT (ZOFRAN-ODT) 8 MG  disintegrating tablet PLACE 1 TABLET UNDER THE TONGUE EVERY 8 (EIGHT) HOURS AS NEEDED FOR NAUSEA OR VOMITING. 6/1/21   Danae Gomez APRN   oxyCODONE (ROXICODONE) 10 MG tablet Take 1 tablet by mouth Every 4 (Four) Hours As Needed for Moderate Pain . 4/9/21   Fito Ram MD   oxyCODONE (ROXICODONE) 10 MG tablet Take 1 tablet by mouth Every 4 (Four) Hours As Needed for Moderate Pain . 4/9/21   Fito Ram MD   oxyCODONE (ROXICODONE) 10 MG tablet Take 1 tablet by mouth Every 4 (Four) Hours As Needed for Moderate Pain . 6/11/21   Fito Ram MD   phosphorus (K PHOS NEUTRAL) 155-852-130 MG tablet Take 1 tablet by mouth 2 (Two) Times a Day. Take 1 tablet every 12 hours for 2 doses and then stop.    ProviderElla MD   potassium chloride (K-DUR,KLOR-CON) 20 MEQ CR tablet Take 2 tablets by mouth Daily. 3/19/21   Cindy Ball MD   pregabalin (LYRICA) 100 MG capsule Take 1 capsule by mouth 3 (Three) Times a Day. 1/12/21   Fito Ram MD   sertraline (ZOLOFT) 50 MG tablet Take 50 mg by mouth Every Night.    ProviderElla MD   temazepam (RESTORIL) 30 MG capsule TAKE 1 CAPSULE BY MOUTH AT NIGHT AS NEEDED FOR SLEEP 6/8/21   Noemy Queen MD   triamterene-hydrochlorothiazide (DYAZIDE) 37.5-25 MG per capsule TAKE 1 CAPSULE BY MOUTH EVERY DAY 12/2/20   Noemy Queen MD   warfarin (COUMADIN) 1 MG tablet Take 1 tablet by mouth Daily. At 1700 as directed 6/8/21   Cindy Ball MD       Allergies:    Allergies   Allergen Reactions   • Morphine Nausea Only and Hives   • Penicillin V Potassium Rash   • Sulfa Antibiotics Rash   • Levaquin [Levofloxacin] Nausea Only and Dizziness     When taken with Coumadin   • Amoxicillin Rash       Objective   Objective     Vital Signs  Temp:  [98 °F (36.7 °C)-98.3 °F (36.8 °C)] 98 °F (36.7 °C)  Heart Rate:  [51-54] 54  Resp:  [16-18] 16  BP: (120-124)/(72-81) 124/72  SpO2:  [95 %-100 %] 98 %  on  Flow (L/min):  [2] 2;    Device (Oxygen Therapy): nasal cannula  Body mass index is 29.12 kg/m².    Physical Exam  Vitals and nursing note reviewed.   Constitutional:       Appearance: She is ill-appearing.   HENT:      Head: Normocephalic and atraumatic.      Right Ear: External ear normal.      Left Ear: External ear normal.      Nose: Congestion present.      Mouth/Throat:      Mouth: Mucous membranes are moist.   Eyes:      Pupils: Pupils are equal, round, and reactive to light.   Cardiovascular:      Rate and Rhythm: Regular rhythm. Bradycardia present.      Pulses: Normal pulses.      Heart sounds: Normal heart sounds.   Pulmonary:      Effort: Tachypnea and accessory muscle usage present.      Breath sounds: Decreased air movement present. Examination of the right-upper field reveals decreased breath sounds, wheezing and rhonchi. Examination of the left-upper field reveals decreased breath sounds, wheezing and rhonchi. Examination of the right-middle field reveals decreased breath sounds, wheezing and rhonchi. Examination of the left-middle field reveals decreased breath sounds, wheezing and rhonchi. Examination of the right-lower field reveals decreased breath sounds. Examination of the left-lower field reveals decreased breath sounds. Decreased breath sounds, wheezing and rhonchi present.   Abdominal:      General: Bowel sounds are normal.      Palpations: Abdomen is soft.   Musculoskeletal:         General: Normal range of motion.      Cervical back: No rigidity.      Right lower leg: No edema.      Left lower leg: No edema.   Skin:     General: Skin is warm.      Capillary Refill: Capillary refill takes less than 2 seconds.      Coloration: Skin is sallow.      Findings: Ecchymosis present.   Neurological:      Mental Status: She is alert and oriented to person, place, and time. Mental status is at baseline.      Motor: Weakness present.   Psychiatric:         Attention and Perception: Attention and perception normal.          Mood and Affect: Mood is anxious.         Speech: Speech normal.         Behavior: Behavior is cooperative.         Thought Content: Thought content normal.         Cognition and Memory: Cognition and memory normal.         Judgment: Judgment normal.           Results Review:  I have personally reviewed most recent clinical lab results.      Results from last 7 days   Lab Units 06/14/21 0007 06/13/21 1930   WBC 10*3/mm3  --  4.00   HEMOGLOBIN g/dL  --  9.9*   HEMATOCRIT %  --  29.4*   PLATELETS 10*3/mm3  --  172   INR  >8.00* 7.54*     Results from last 7 days   Lab Units 06/14/21 0007 06/13/21 1930   SODIUM mmol/L  --  135*   POTASSIUM mmol/L  --  4.4   CHLORIDE mmol/L  --  98   CO2 mmol/L  --  24.0   BUN mg/dL  --  20   CREATININE mg/dL  --  1.11*   GLUCOSE mg/dL  --  96   CALCIUM mg/dL  --  8.9   ALT (SGPT) U/L  --  6   AST (SGOT) U/L  --  15   TROPONIN T ng/mL <0.010 <0.010     Estimated Creatinine Clearance: 52.6 mL/min (A) (by C-G formula based on SCr of 1.11 mg/dL (H)).  Brief Urine Lab Results     None          Microbiology Results (last 10 days)     ** No results found for the last 240 hours. **          ECG/EMG Results (most recent)     Procedure Component Value Units Date/Time    ECG 12 Lead [[id number]] Collected: 06/13/21 1913     Updated: 06/13/21 1916     QT Interval 435 ms     Narrative:      HEART RATE= 53  bpm  RR Interval= 1124  ms  MT Interval= 159  ms  P Horizontal Axis= -3  deg  P Front Axis= 47  deg  QRSD Interval= 89  ms  QT Interval= 435  ms  QRS Axis= 6  deg  T Wave Axis= 29  deg  - OTHERWISE NORMAL ECG -  Sinus bradycardia  When compared with ECG of 18-Mar-2021 13:17:10,  Significant repolarization change  Electronically Signed By:   Date and Time of Study: 2021-06-13 19:13:56              I have personally reviewed cardiac tracings and agree with findings.      Estimated Creatinine Clearance: 52.6 mL/min (A) (by C-G formula based on SCr of 1.11 mg/dL (H)).    Assessment/Plan    Assessment/Plan       Active Hospital Problems    Diagnosis  POA   • **Chest pain [R07.9]  Yes     Priority: High   • Elevated international normalized ratio (INR) due to prior anticoagulant medication ingestion [R79.1]  Yes     Priority: High   • Mucus plugging of bronchi [J98.09]  Yes     Priority: High   • Osteoporosis [M81.0]  Yes     Priority: Medium   • Lung nodule [R91.1]  Yes     Priority: Medium   • Malignant neoplasm of right ovary (CMS/HCC) [C56.1]  Yes     Priority: Medium   • Essential hypertension [I10]  Yes     Priority: Medium   • Pain of metastatic malignancy [G89.3]  Yes     Priority: Medium   • Hyperlipidemia [E78.5]  Yes     Priority: Medium   • History of DVT (deep vein thrombosis) [Z86.718]  Not Applicable     Priority: Low   • Obesity (BMI 30-39.9) [E66.9]  Yes     Priority: Low   • Depression [F32.9]  Yes     Priority: Low   • Brain metastases (CMS/HCC) [C79.31]  Yes     Priority: Low   • Long term (current) use of anticoagulants [Z79.01]  Not Applicable     Priority: Low   • Anxiety [F41.9]  Yes     Priority: Low   • Insomnia [G47.00]  Yes     Priority: Low   • Fibromyositis [M79.7]  Yes     Priority: Low      Resolved Hospital Problems   No resolved problems to display.       Chest pain:  -Initial troponin negative  -CT for PE protocol shows interval progression of extensive osseous metastatic disease. Findings are consistent with progression of metastatic neoplasm. Worsening asymmetric interlobular septal thickening throughout the right lung with associated reticulonodular and miliary type nodular airspace disease. 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 This has worsened. Similar  findings are identified in the left lower lobe as well. Mediastinal and subcarinal adenopathy. This is felt to be metastatic and is essentially stable given differences in technique. Bronchial wall thickening. There are subtle areas of mucous plugging involving multiple bronchioles  to the right lower lobe. Correlate for bronchiolitis, reactive airways lung disease or possibly aspiration  -EKG shows sinus bradycardia rate 53 when compared with EKG of 03/18/2021 significant repolarization change  -In the ED patient was given 500 NS bolus, IV Dilaudid  -Echocardiogram ordered to evaluate LV function  -Trend EKGs every 6 hours x2  -Trend troponins every 6 hours x2  -Continuous cardiac monitoring  -Vital signs every 4    Elevated INR due to long-term anticoagulation:  -Upon arrival to the ED INR 7.54  -Patient treated in ED with 5 mg p.o. vitamin K  -Continue to monitor INR, may need to administer IV vitamin K and/or FFP    Mucus plugging of bronchi:  -CT for PE protocol shows interval progression of extensive osseous metastatic disease. Findings are consistent with progression of metastatic neoplasm. Worsening asymmetric interlobular septal thickening throughout the right lung with associated reticulonodular and miliary type nodular airspace disease. 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 This has worsened. Similar  findings are identified in the left lower lobe as well. Mediastinal and subcarinal adenopathy. This is felt to be metastatic and is essentially stable given differences in technique. Bronchial wall thickening. There are subtle areas of mucous plugging involving multiple bronchioles to the right lower lobe. Correlate for bronchiolitis, reactive airways lung disease or possibly aspiration. Small hiatal hernia. No pulmonary embolus  -Consult pulmonology for potential bronchoscopy, patient currently under the care of Dr. Esparza for lung metastasis    Ovarian CA with metastasis to brain, lungs, bone:  -Patient currently receiving oral chemotherapy  -Consult oncology, patient under the care of Dr. STEPHEN Zaman    Pain of metastatic malignancy:  -Continue home medication Roxicodone 10 mg p.o. every 4 as needed for moderate pain  -Continue home medication Hycodan 5-1.5 mg  5 mL p.o. twice daily as needed for cough  -Inspect verified    Essential hypertension  -Continue home medication Dyazide 37.5-25 mg p.o. daily    Mixed hyperlipidemia:  -Continue home medication atorvastatin 20 mg p.o. nightly    History of DVT/long-term use of anticoagulants:  -Pharmacy to dose warfarin during admission for lung metastasis     Anxiety/depression:  -Continue home medication Zoloft 50 mg p.o. nightly    Fibromatosis:  -Continue home medication Lyrica 100 mg p.o. 3 times daily  -Inspect verified    Obesity:  -Encourage dietary changes    Osteoporosis:  -Continue home medication calcium 500 mg vitamin D 5 mg p.o. twice daily    Insomnia:  -Continue home medication Restoril 30 mg p.o. nightly as needed for insomnia        VTE Prophylaxis -   Mechanical Order History:     None      Pharmalogical Order History:     None          CODE STATUS:    Code Status and Medical Interventions:   Ordered at: 06/13/21 4522     Level Of Support Discussed With:    Patient     Code Status:    CPR     Medical Interventions (Level of Support Prior to Arrest):    Full       This patient has been interviewed wearing appropriate personal protective equipment and findings discussed with the ED provider.        I discussed the patient's findings and my recommendations with patient.      Signature:Electronically signed by IRENA Saenz, 06/14/21, 1:47 AM EDT.    [facility] Hospitalist Team

## 2021-06-14 NOTE — PROGRESS NOTES
[facility] Medicine Services Daily Progress Note      Hospitalist Team  LOS 0 days      Patient Care Team:  Noemy Queen MD as PCP - General (Family Medicine)  Cindy Ball MD as Consulting Physician (Hematology and Oncology)    Patient Location: 220/1      Subjective   Subjective     Chief Complaint / Subjective  Chief Complaint   Patient presents with   • Chest Pain         Brief Synopsis of Hospital Course/HPI  Ms. Zimmerman is a 65 y.o. 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 Patient states 10/10 sharp midsternal chest pain with radiation to right chest wall began this evening while doing laundry, no aggravating or alleviating factors noted.  Patient admits to cough, dyspnea, congestion, hoarseness, weakness, joint pain.  Patient denies fever, chills, nausea, vomiting, palpitations.  As dyspnea did not improve she decided to go to [facility] ED     On arrival to the ED vitals temp 98.3, HR 54, RR 18, /81, O2 sats 100% on room air.  Troponin less than 0.010, glucose 96, , K4.4, CO2 24, creatinine 1.11, BUN 20, alkaline phosphatase 121, ALT 6, AST 15, total bili 0.3, INR 7.54, PTT 75.3, PTT 54.8, WBC 4.0, Hgb 9.9, platelets 172, neutrophils 74.9.  EKG shows sinus bradycardia rate 53 when compared with EKG of 03/18/2021 significant repolarization change.  Chest x-ray shows left subclavian Port-A-Cath in place, heart size at the upper limits of normal, ill-defined right upper lobe airspace disease suspicious for pneumonia, hazy bibasilar airspace disease similar to prior study likely due to chronic lung disease.  CT for PE protocol shows interval progression of extensive osseous metastatic disease. Findings are consistent with progression of metastatic neoplasm. Worsening asymmetric  "interlobular septal thickening throughout the right lung with associated reticulonodular and miliary type nodular airspace disease. 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 This has worsened. Similar  findings are identified in the left lower lobe as well. Mediastinal and subcarinal adenopathy. This is felt to be metastatic and is essentially stable given differences in technique. Bronchial wall thickening. There are subtle areas of mucous plugging involving multiple bronchioles to the right lower lobe. Correlate for bronchiolitis, reactive airways lung disease or possibly aspiration. Small hiatal hernia. No pulmonary embolus.  Patient treated in the ED with 5 mg p.o. vitamin K, 500 NS bolus, IV Dilaudid.     Date:6/14/21: Patient seen and examined in bed no acute distress, signs stable, pulmonary consulted.  Awaiting input.  Dyspnea on 2 L of oxygen.        Review of Systems   Constitutional: Negative.   HENT: Negative.    Eyes: Negative.    Cardiovascular: Positive for dyspnea on exertion.   Respiratory: Positive for shortness of breath.    Endocrine: Negative.    Hematologic/Lymphatic: Negative.    Skin: Negative.    Musculoskeletal: Negative.    Gastrointestinal: Negative.    Genitourinary: Negative.    Neurological: Negative.    Psychiatric/Behavioral: Negative.    Allergic/Immunologic: Negative.    All other systems reviewed and are negative.        Objective   Objective      Vital Signs  Temp:  [97.3 °F (36.3 °C)-98.3 °F (36.8 °C)] 97.3 °F (36.3 °C)  Heart Rate:  [51-74] 74  Resp:  [16-18] 18  BP: (112-163)/(66-81) 163/66  Oxygen Therapy  SpO2: 95 %  Pulse Oximetry Type: Intermittent  Device (Oxygen Therapy): room air  Flow (L/min): 2  Flowsheet Rows      First Filed Value   Admission Height  165.1 cm (65\") Documented at 06/13/2021 1913   Admission Weight  81.6 kg (180 lb) Documented at 06/13/2021 1913        Intake & Output (last 3 days)     None        Lines, Drains & Airways  "   Active LDAs     Name:   Placement date:   Placement time:   Site:   Days:    Peripheral IV 06/13/21 1928 Right Antecubital   06/13/21 1928    Antecubital   less than 1    Single Lumen Implantable Port  Left Chest   -- unknown;frida rajan has been in a long time    -- unknown    Chest       VAD   --    --    LVAD                   Physical Exam  Vitals and nursing note reviewed.   Constitutional:       General: She is not in acute distress.     Appearance: Normal appearance. She is well-developed. She is not ill-appearing, toxic-appearing or diaphoretic.   HENT:      Head: Normocephalic and atraumatic.      Nose: Nose normal. No congestion or rhinorrhea.      Mouth/Throat:      Mouth: Mucous membranes are moist.      Pharynx: No oropharyngeal exudate.   Eyes:      General: No scleral icterus.        Right eye: No discharge.         Left eye: No discharge.      Extraocular Movements: Extraocular movements intact.      Conjunctiva/sclera: Conjunctivae normal.      Pupils: Pupils are equal, round, and reactive to light.   Neck:      Thyroid: No thyromegaly.      Vascular: No carotid bruit or JVD.      Trachea: No tracheal deviation.   Cardiovascular:      Rate and Rhythm: Normal rate and regular rhythm.      Pulses: Normal pulses.      Heart sounds: Normal heart sounds. No murmur heard.   No friction rub. No gallop.    Pulmonary:      Effort: Pulmonary effort is normal. No respiratory distress.      Breath sounds: No stridor. Rhonchi present. No wheezing or rales.   Chest:      Chest wall: No tenderness.   Abdominal:      General: Bowel sounds are normal. There is no distension.      Palpations: Abdomen is soft. There is no mass.      Tenderness: There is no abdominal tenderness. There is no guarding or rebound.      Hernia: No hernia is present.   Musculoskeletal:         General: No swelling, tenderness, deformity or signs of injury. Normal range of motion.      Cervical back: Normal range of motion and neck supple.  No rigidity. No muscular tenderness.      Right lower leg: No edema.      Left lower leg: No edema.   Lymphadenopathy:      Cervical: No cervical adenopathy.   Skin:     General: Skin is warm and dry.      Coloration: Skin is not jaundiced or pale.      Findings: No bruising, erythema or rash.   Neurological:      General: No focal deficit present.      Mental Status: She is alert and oriented to person, place, and time. Mental status is at baseline.      Cranial Nerves: No cranial nerve deficit.      Sensory: No sensory deficit.      Motor: No weakness or abnormal muscle tone.      Coordination: Coordination normal.   Psychiatric:         Mood and Affect: Mood normal.         Behavior: Behavior normal.         Thought Content: Thought content normal.         Judgment: Judgment normal.               Procedures:              Results Review:     I reviewed the patient's new clinical results.      Lab Results (last 24 hours)     Procedure Component Value Units Date/Time    Troponin [[id number]]  (Normal) Collected: 06/14/21 0557    Specimen: Blood Updated: 06/14/21 0702     Troponin T <0.010 ng/mL     Narrative:      Troponin T Reference Range:  <= 0.03 ng/mL-   Negative for AMI  >0.03 ng/mL-     Abnormal for myocardial necrosis.  Clinicians would have to utilize clinical acumen, EKG, Troponin and serial changes to determine if it is an Acute Myocardial Infarction or myocardial injury due to an underlying chronic condition.       Results may be falsely decreased if patient taking Biotin.      Protime-INR [[id number]]  (Abnormal) Collected: 06/14/21 0410    Specimen: Blood Updated: 06/14/21 0511     Protime 44.1 Seconds      INR 4.30    Basic Metabolic Panel [[id number]]  (Abnormal) Collected: 06/14/21 0410    Specimen: Blood Updated: 06/14/21 0459     Glucose 132 mg/dL      BUN 20 mg/dL      Creatinine 1.06 mg/dL      Sodium 136 mmol/L      Potassium 3.8 mmol/L      Chloride 100 mmol/L      CO2 25.0 mmol/L      Calcium  8.6 mg/dL      eGFR Non African Amer 52 mL/min/1.73      BUN/Creatinine Ratio 18.9     Anion Gap 11.0 mmol/L     Narrative:      GFR Normal >60  Chronic Kidney Disease <60  Kidney Failure <15      Magnesium [[id number]]  (Normal) Collected: 06/14/21 0410    Specimen: Blood Updated: 06/14/21 0459     Magnesium 1.7 mg/dL     CBC Auto Differential [[id number]]  (Abnormal) Collected: 06/14/21 0410    Specimen: Blood Updated: 06/14/21 0436     WBC 3.60 10*3/mm3      RBC 3.36 10*6/mm3      Hemoglobin 9.7 g/dL      Hematocrit 29.3 %      MCV 87.4 fL      MCH 28.8 pg      MCHC 33.0 g/dL      RDW 18.4 %      RDW-SD 56.4 fl      MPV 8.1 fL      Platelets 136 10*3/mm3      Neutrophil % 80.8 %      Lymphocyte % 16.3 %      Monocyte % 0.9 %      Eosinophil % 1.9 %      Basophil % 0.1 %      Neutrophils, Absolute 2.90 10*3/mm3      Lymphocytes, Absolute 0.60 10*3/mm3      Monocytes, Absolute 0.00 10*3/mm3      Eosinophils, Absolute 0.10 10*3/mm3      Basophils, Absolute 0.00 10*3/mm3      nRBC 0.0 /100 WBC     Troponin [[id number]]  (Normal) Collected: 06/14/21 0007    Specimen: Blood Updated: 06/14/21 0038     Troponin T <0.010 ng/mL     Narrative:      Troponin T Reference Range:  <= 0.03 ng/mL-   Negative for AMI  >0.03 ng/mL-     Abnormal for myocardial necrosis.  Clinicians would have to utilize clinical acumen, EKG, Troponin and serial changes to determine if it is an Acute Myocardial Infarction or myocardial injury due to an underlying chronic condition.       Results may be falsely decreased if patient taking Biotin.      Protime-INR [[id number]]  (Abnormal) Collected: 06/14/21 0007    Specimen: Blood Updated: 06/14/21 0036     Protime >90.0 Seconds      INR >8.00    COVID PRE-OP / PRE-PROCEDURE SCREENING ORDER (NO ISOLATION) - Swab, Nasopharynx [[id number]] Collected: 06/13/21 2332    Specimen: Swab from Nasopharynx Updated: 06/13/21 2334    Narrative:      The following orders were created for panel order COVID PRE-OP /  PRE-PROCEDURE SCREENING ORDER (NO ISOLATION) - Swab, Nasopharynx.  Procedure                               Abnormality         Status                     ---------                               -----------         ------                     COVID-19,APTIMA PANTHER,...[[id number]]                      In process                   Please view results for these tests on the individual orders.    COVID-19,APTIMA PANTHER,[facility] IN-HOUSE, NP/OP SWAB IN UTM/VTM/SALINE TRANSPORT MEDIA,24 HR TAT - Swab, Nasopharynx [[id number]] Collected: 06/13/21 2332    Specimen: Swab from Nasopharynx Updated: 06/13/21 2334    [facility] Draw [[id number]] Collected: 06/13/21 1930    Specimen: Blood Updated: 06/13/21 2031    Narrative:      The following orders were created for panel order [facility] Draw.  Procedure                               Abnormality         Status                     ---------                               -----------         ------                     Green Top (Gel)[[id number]]                                  Final result               Lavender Top[[id number]]                                     Final result               Gold Top - SST[[id number]]                                   Final result                 Please view results for these tests on the individual orders.    Gold Top - SST [[id number]] Collected: 06/13/21 1930    Specimen: Blood Updated: 06/13/21 2031     Extra Tube Hold for add-ons.     Comment: Auto resulted.       Lavender Top [[id number]] Collected: 06/13/21 1930    Specimen: Blood Updated: 06/13/21 2031     Extra Tube hold for add-on     Comment: Auto resulted       aPTT [[id number]]  (Abnormal) Collected: 06/13/21 1930    Specimen: Blood Updated: 06/13/21 2013     PTT 54.8 seconds     Protime-INR [[id number]]  (Abnormal) Collected: 06/13/21 1930    Specimen: Blood Updated: 06/13/21 2013     Protime 75.3 Seconds      INR 7.54    Green Top (Gel) [[id number]] Collected: 06/13/21 1930    Specimen: Blood Updated:  06/13/21 2001     Extra Tube HOLD    Comprehensive Metabolic Panel [[id number]]  (Abnormal) Collected: 06/13/21 1930    Specimen: Blood Updated: 06/13/21 1956     Glucose 96 mg/dL      BUN 20 mg/dL      Creatinine 1.11 mg/dL      Sodium 135 mmol/L      Potassium 4.4 mmol/L      Chloride 98 mmol/L      CO2 24.0 mmol/L      Calcium 8.9 mg/dL      Total Protein 7.0 g/dL      Albumin 3.70 g/dL      ALT (SGPT) 6 U/L      AST (SGOT) 15 U/L      Alkaline Phosphatase 121 U/L      Total Bilirubin 0.3 mg/dL      eGFR Non African Amer 49 mL/min/1.73      Globulin 3.3 gm/dL      A/G Ratio 1.1 g/dL      BUN/Creatinine Ratio 18.0     Anion Gap 13.0 mmol/L     Narrative:      GFR Normal >60  Chronic Kidney Disease <60  Kidney Failure <15      Troponin [[id number]]  (Normal) Collected: 06/13/21 1930    Specimen: Blood Updated: 06/13/21 1956     Troponin T <0.010 ng/mL     Narrative:      Troponin T Reference Range:  <= 0.03 ng/mL-   Negative for AMI  >0.03 ng/mL-     Abnormal for myocardial necrosis.  Clinicians would have to utilize clinical acumen, EKG, Troponin and serial changes to determine if it is an Acute Myocardial Infarction or myocardial injury due to an underlying chronic condition.       Results may be falsely decreased if patient taking Biotin.      CBC & Differential [[id number]]  (Abnormal) Collected: 06/13/21 1930    Specimen: Blood Updated: 06/13/21 1939    Narrative:      The following orders were created for panel order CBC & Differential.  Procedure                               Abnormality         Status                     ---------                               -----------         ------                     CBC Auto Differential[[id number]]        Abnormal            Final result                 Please view results for these tests on the individual orders.    CBC Auto Differential [[id number]]  (Abnormal) Collected: 06/13/21 1930    Specimen: Blood Updated: 06/13/21 1939     WBC 4.00 10*3/mm3      RBC 3.34  10*6/mm3      Hemoglobin 9.9 g/dL      Hematocrit 29.4 %      MCV 88.1 fL      MCH 29.6 pg      MCHC 33.5 g/dL      RDW 19.0 %      RDW-SD 58.2 fl      MPV 8.7 fL      Platelets 172 10*3/mm3      Neutrophil % 74.9 %      Lymphocyte % 20.2 %      Monocyte % 1.0 %      Eosinophil % 3.6 %      Basophil % 0.3 %      Neutrophils, Absolute 3.00 10*3/mm3      Lymphocytes, Absolute 0.80 10*3/mm3      Monocytes, Absolute 0.00 10*3/mm3      Eosinophils, Absolute 0.10 10*3/mm3      Basophils, Absolute 0.00 10*3/mm3      nRBC 0.0 /100 WBC         No results found for: HGBA1C  Results from last 7 days   Lab Units 06/14/21  0410 06/14/21  0007 06/13/21  1930   INR  4.30* >8.00* 7.54*           Lab Results   Component Value Date    LIPASE 20 (L) 03/21/2017     Lab Results   Component Value Date    CHOL 194 09/22/2020    TRIG 74 09/22/2020    HDL 56 09/22/2020     (H) 09/22/2020       Lab Results   Lab Value Date/Time    FINALDX  02/10/2021 1254     Lung, right upper lobe, bronchoalveolar lavage, smears and cytospin preparation:    Positive for malignancy, non-small cell carcinoma, see comment    JPR/tkd       COMDX  02/10/2021 1254     Procurement of additional tissue is required for a more definitive diagnosis.           Microbiology Results (last 10 days)     ** No results found for the last 240 hours. **          ECG/EMG Results (most recent)     Procedure Component Value Units Date/Time    ECG 12 Lead [[id number]] Collected: 06/13/21 1913     Updated: 06/13/21 1916     QT Interval 435 ms     Narrative:      HEART RATE= 53  bpm  RR Interval= 1124  ms  CA Interval= 159  ms  P Horizontal Axis= -3  deg  P Front Axis= 47  deg  QRSD Interval= 89  ms  QT Interval= 435  ms  QRS Axis= 6  deg  T Wave Axis= 29  deg  - OTHERWISE NORMAL ECG -  Sinus bradycardia  When compared with ECG of 18-Mar-2021 13:17:10,  Significant repolarization change  Electronically Signed By:   Date and Time of Study: 2021-06-13 19:13:56    ECG 12 Lead  [[id number]] Collected: 06/14/21 0312     Updated: 06/14/21 0314     QT Interval 450 ms     Narrative:      HEART RATE= 53  bpm  RR Interval= 1132  ms  NJ Interval= 163  ms  P Horizontal Axis= -52  deg  P Front Axis= 67  deg  QRSD Interval= 96  ms  QT Interval= 450  ms  QRS Axis= 15  deg  T Wave Axis= 35  deg  - ABNORMAL ECG -  Sinus bradycardia  Nonspecific T abnormalities, anterior leads  Electronically Signed By:   Date and Time of Study: 2021-06-14 03:12:59    ECG 12 Lead [[id number]] Collected: 06/14/21 0851     Updated: 06/14/21 0853     QT Interval 468 ms     Narrative:      HEART RATE= 62  bpm  RR Interval= 968  ms  NJ Interval= 170  ms  P Horizontal Axis= 5  deg  P Front Axis= 28  deg  QRSD Interval= 99  ms  QT Interval= 468  ms  QRS Axis= 3  deg  T Wave Axis= 10  deg  - NORMAL ECG -  Sinus rhythm  When compared with ECG of 14-Jun-2021 3:12:59,  Significant repolarization change  Electronically Signed By:   Date and Time of Study: 2021-06-14 08:51:21    Adult Transthoracic Echo Complete W/ Cont if Necessary Per Protocol [[id number]] Resulted: 06/14/21 0951     Updated: 06/14/21 1017     Target HR (85%) 132 bpm      Max. Pred. HR (100%) 155 bpm               Results for orders placed during the hospital encounter of 03/04/21    Adult Transthoracic Echo Complete W/ Cont if Necessary Per Protocol    Interpretation Summary  · Estimated left ventricular EF = 60% Left ventricular systolic function is normal.    Indications  Pericardial effusion seen on CT scan.    Technically satisfactory study.  Mitral valve is structurally normal.  Tricuspid valve is structurally normal.  Aortic valve is structurally normal.  Pulmonic valve could not be well visualized.  No evidence for mitral tricuspid or aortic regurgitation is seen by Doppler study.  Left atrium is normal in size.  Right atrium is normal in size.  Left ventricle is normal in size and contractility with ejection fraction of 60%.  Right ventricle is normal in  size.  Atrial septum is intact.  Aorta is normal.  No pericardial effusion or intracardiac thrombus is seen.    Impression  Structurally and functionally normal cardiac valves.  Left ventricular size and contractility is normal with ejection fraction of 60%.      XR Chest 1 View    Result Date: 6/13/2021    1.  New hazy right upper lobe airspace disease suspicious for pneumonia. 2.  No change in hazy bibasilar airspace disease likely due to chronic lung disease.   Electronically Signed By-Reid Nelson MD On:6/13/2021 8:11 PM This report was finalized on [id number] by  Reid Nelson MD.    CT Chest Pulmonary Embolism    Result Date: 6/13/2021  1. Interval progression of extensive osseous metastatic disease. Findings are consistent with progression of metastatic neoplasm. 2. Worsening asymmetric interlobular septal thickening throughout the right lung with associated reticulonodular and miliary type nodular airspace disease. 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 This has worsened. Similar findings are identified in the left lower lobe as well. 3. Mediastinal and subcarinal adenopathy. This is felt to be metastatic and is essentially stable given differences in technique. 4. Bronchial wall thickening. There are subtle areas of mucous plugging involving multiple bronchioles to the right lower lobe. Correlate for bronchiolitis, reactive airways lung disease or possibly aspiration. 5. Small hiatal hernia. 6. No pulmonary embolus. Slot 63 Electronically signed by:  Reyes Agarwal M.D.  6/13/2021 6:50 PM          Xrays, labs reviewed personally by physician.    Medication Review:   I have reviewed the patient's current medication list      Scheduled Meds  acetylcysteine, 2 mL, Nebulization, BID - RT  atorvastatin, 20 mg, Oral, Daily  calcium 500 mg vitamin D 5 mcg (200 UT), 1 tablet, Oral, BID  cetirizine, 10 mg, Oral, Nightly  folic acid, 1,000 mcg, Oral, Daily  ipratropium-albuterol, 3  mL, Nebulization, Q4H - RT  magnesium oxide, 400 mg, Oral, BID  methylPREDNISolone sodium succinate, 40 mg, Intravenous, Q8H  montelukast, 10 mg, Oral, Nightly  pantoprazole, 40 mg, Oral, BID AC  potassium chloride, 40 mEq, Oral, Daily  pregabalin, 100 mg, Oral, TID  sertraline, 50 mg, Oral, Nightly  sodium chloride, 10 mL, Intravenous, Q12H  theophylline, 300 mg, Oral, Q24H  triamterene-hydrochlorothiazide, 1 tablet, Oral, Daily        Meds Infusions  Pharmacy to dose warfarin,   sodium chloride, 100 mL/hr, Last Rate: 100 mL/hr (06/14/21 0045)        Meds PRN  •  acetaminophen **OR** acetaminophen **OR** acetaminophen  •  albuterol  •  aluminum-magnesium hydroxide-simethicone  •  HYDROcodone-homatropine  •  magnesium sulfate **OR** magnesium sulfate in D5W 1g/100mL (PREMIX)  •  melatonin  •  nitroglycerin  •  ondansetron **OR** ondansetron  •  oxyCODONE  •  Pharmacy to dose warfarin  •  potassium chloride  •  sodium chloride  •  [COMPLETED] Insert peripheral IV **AND** sodium chloride  •  temazepam        Assessment/Plan   Assessment/Plan     Active Hospital Problems    Diagnosis  POA   • **Chest pain [R07.9]  Yes   • Elevated international normalized ratio (INR) due to prior anticoagulant medication ingestion [R79.1]  Yes   • Mucus plugging of bronchi [J98.09]  Yes   • History of DVT (deep vein thrombosis) [Z86.718]  Not Applicable   • Obesity (BMI 30-39.9) [E66.9]  Yes   • Depression [F32.9]  Yes   • Osteoporosis [M81.0]  Yes   • Brain metastases (CMS/HCC) [C79.31]  Yes   • Lung nodule [R91.1]  Yes   • Long term (current) use of anticoagulants [Z79.01]  Not Applicable   • Malignant neoplasm of right ovary (CMS/HCC) [C56.1]  Yes   • Anxiety [F41.9]  Yes   • Essential hypertension [I10]  Yes   • Insomnia [G47.00]  Yes   • Pain of metastatic malignancy [G89.3]  Yes   • Fibromyositis [M79.7]  Yes   • Hyperlipidemia [E78.5]  Yes      Resolved Hospital Problems   No resolved problems to display.       MEDICAL DECISION  MAKING COMPLEXITY BY PROBLEM:      Chest pain:  - troponin negative  -CT for PE protocol shows interval progression of extensive osseous metastatic disease. Findings are consistent with progression of metastatic neoplasm. Worsening asymmetric interlobular septal thickening throughout the right lung with associated reticulonodular and miliary type nodular airspace disease. 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 This has worsened. Similar  findings are identified in the left lower lobe as well. Mediastinal and subcarinal adenopathy. This is felt to be metastatic and is essentially stable given differences in technique. Bronchial wall thickening. There are subtle areas of mucous plugging involving multiple bronchioles to the right lower lobe. Correlate for bronchiolitis, reactive airways lung disease or possibly aspiration  -EKG shows sinus bradycardia rate 53 when compared with EKG of 03/18/2021 significant repolarization change  -In the ED patient was given 500 NS bolus, IV Dilaudid  -Echocardiogram ordered to evaluate LV function  -Trend EKGs every 6 hours x2  -Trend troponins every 6 hours x2  -Continuous cardiac monitoring  -Vital signs every 4     Elevated INR due to long-term anticoagulation:  -Upon arrival to the ED INR 7.54  -Patient treated in ED with 5 mg p.o. vitamin K  -Continue to monitor INR, may need to administer IV vitamin K and/or FFP     Mucus plugging of bronchi:  -CT for PE protocol shows interval progression of extensive osseous metastatic disease. Findings are consistent with progression of metastatic neoplasm. Worsening asymmetric interlobular septal thickening throughout the right lung with associated reticulonodular and miliary type nodular airspace disease. 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 This has worsened. Similar  findings are identified in the left lower lobe as well. Mediastinal and subcarinal adenopathy. This is felt to be  metastatic and is essentially stable given differences in technique. Bronchial wall thickening. There are subtle areas of mucous plugging involving multiple bronchioles to the right lower lobe. Correlate for bronchiolitis, reactive airways lung disease or possibly aspiration. Small hiatal hernia. No pulmonary embolus  -Consult pulmonology for potential bronchoscopy, patient currently under the care of Dr. 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 STEPHEN Zaman     Pain of metastatic malignancy:  -Continue home medication Roxicodone 10 mg p.o. every 4 as needed for moderate pain  -Continue home medication Hycodan 5-1.5 mg 5 mL p.o. twice daily as needed for cough  -Inspect verified     Essential hypertension  -Continue home medication Dyazide 37.5-25 mg p.o. daily     Mixed hyperlipidemia:  -Continue home medication atorvastatin 20 mg p.o. nightly     History of DVT/long-term use of anticoagulants:  -Pharmacy to dose warfarin during admission for lung metastasis     Anxiety/depression:  -Continue home medication Zoloft 50 mg p.o. nightly     Fibromatosis:  -Continue home medication Lyrica 100 mg p.o. 3 times daily  -Inspect verified     Obesity:  -Encourage dietary changes     Osteoporosis:  -Continue home medication calcium 500 mg vitamin D 5 mg p.o. twice daily     Insomnia:  -Continue home medication Restoril 30 mg p.o. nightly as needed for insomnia        VTE Prophylaxis -   Mechanical Order History:      Ordered        06/13/21 2312  Place Sequential Compression Device  Once         06/13/21 2312  Maintain Sequential Compression Device  Continuous                 Pharmalogical Order History:      Ordered     Dose Route Frequency Stop    06/13/21 2305  Pharmacy to dose warfarin     Question:  Target INR  Answer:  2 - 3    -- XX Continuous PRN --                  Code Status -   Code  Status and Medical Interventions:   Ordered at: 06/13/21 2312     Level Of Support Discussed With:    Patient     Code Status:    CPR     Medical Interventions (Level of Support Prior to Arrest):    Full       This patient has been examined wearing appropriate Personal Protective Equipment and discussed with [facility] health department and rn. 06/14/21        Discharge Planning  home        Electronically signed by Gautam Hartley MD, 06/14/21, 12:57 EDT.  [facility] Hospitalist Team

## 2021-06-14 NOTE — CASE MANAGEMENT/SOCIAL WORK
Discharge Planning Assessment   [place]     Patient Name: Tahira Zimmerman  MRN: [id number]  Today's Date: 6/14/2021    Admit Date: 6/13/2021    Discharge Needs Assessment    No documentation.       Discharge Plan     Row Name 06/14/21 1742       Plan    Plan  Pt sleeping , CM will follow up 6/15. Pulm, hem/onc consults pending,    Plan Comments  INR 4.3 Sinus keith.                 Demographic Summary     Row Name 06/14/21 2649       General Information    Admission Type  observation    Arrived From  emergency department    Required Notices Provided  Observation Status Notice    Referral Source  admission list    Reason for Consult  discharge planning    General Information Comments  Pt asleep, CM will follow up.                   Dru Barroso RN

## 2021-06-14 NOTE — PROGRESS NOTES
"Pharmacy dosing service  Anticoagulant  Warfarin     Subjective:    Tahira Zimmerman is a 65 y.o.female being continued on warfarin for DVT.    INR Goal: 2 - 3  Home medication?: Yes, warfarin 5.5 mg PO every day at 1800   Bridge Therapy Present?:  No  Interacting Medications Evaluation (New/Present/Discontinued): etoposide PO (may increase INR)  Additional Contributing Factors: Hx DVT/PE, has onco therapy plan including PO etoposide for ovarian cancer with brain, lung, bone mets       Assessment/Plan:    INR significantly supratherapeutic on admission (>8), warfarin held, and IV/PO vit K administered. Upon review of outpatient anticoagulation notes, INR was 3.3 on 6/11 and patient instructed to decrease dose from 6 mg daily to 5.5 mg daily (has 1 mg and 5 mg tablets at home). Will continue to hold warfarin. Will consider decreased home dose upon resumption of warfarin.     Continue to monitor and adjust based on INR.         Date 6/13 6/14          INR 7.54 4.30          Dose Vit K 5mg PO x1  HOLD Vit K 5 mg and 2.5mg  IV x1   HOLD              Objective:  [Ht: 165.1 cm (65\"); Wt: 79.4 kg (175 lb); BMI: Body mass index is 29.12 kg/m².]    Lab Results   Component Value Date    ALBUMIN 3.70 06/13/2021     Lab Results   Component Value Date    INR 4.30 (H) 06/14/2021    INR >8.00 (C) 06/14/2021    INR 7.54 (C) 06/13/2021    PROTIME 44.1 (H) 06/14/2021    PROTIME >90.0 (H) 06/14/2021    PROTIME 75.3 (H) 06/13/2021     Lab Results   Component Value Date    HGB 9.7 (L) 06/14/2021    HGB 9.9 (L) 06/13/2021    HGB 10.8 (L) 05/26/2021     Lab Results   Component Value Date    HCT 29.3 (L) 06/14/2021    HCT 29.4 (L) 06/13/2021    HCT 35.8 05/26/2021       Мария Pandey, PharmD  06/14/21 09:03 EDT       "

## 2021-06-14 NOTE — PLAN OF CARE
Problem: Adult Inpatient Plan of Care  Goal: Patient-Specific Goal (Individualized)  Outcome: Ongoing, Progressing   Goal Outcome Evaluation:

## 2021-06-14 NOTE — PROGRESS NOTES
Hematology/Oncology Inpatient Consultation    Patient name: Tahira Zimmerman  : 1955  MRN: [id number]  Referring Provider: Lillian Starr APRN  Reason for Consultation: Management of oral chemotherapy    Chief complaint: Chest pain    History of present illness:    Tahira Zimmerman is a 65 y.o. female who presented to [facility] on 2021 with complaints of 10 of 10 chest pain that radiated to the right chest wall, and began that evening while doing laundry.  She denies any aggravating or alleviating factors.  Patient admitted to cough, dyspnea, congestion, hoarseness, weakness, joint pain.  The patient denied fever, chills, nausea, vomiting, palpitations.  Since her dyspnea did not improve she decided to go to the hospital.  Upon arrival to the ED, blood pressure 120/81, heart rate 54, respiratory rate 18, O2 sats 100% on room air.  Troponin less than 0.010, EKG revealed sinus bradycardia rate 53.  Chest x-ray revealed left subclavian Port-A-Cath in place, heart size at upper limits of normal, ill-defined right upper lobe airspace disease suspicious for pneumonia, hazy bibasilar airspace disease similar to the prior study likely due to chronic lung disease.  CT for PE protocol revealed internal progression of extensive osseous metastatic disease.  Findings are consistent with progression of metastatic neoplasm.  Worsening asymmetric interlobular septal thickening throughout the right lung with associated reticulonodular and miliary type nodular airspace disease. 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  Similar findings are identified in the left lower lobe.  Mediastinal and subcarinal adenopathy.  There are several areas of mucous plugging involving multiple bronchioles to the right lower lobe.  The patient has a past medical history of ovarian cancer with metastasis to the bone, brain, and lung currently on oral Etoposide chemotherapy.    She received multiple  lines of chemotherapy treatments with Taxol/Carboplatin (10/10/13-2/20/14, 7 cycles) after initial surgery to remove the cancer, Taxotere/Carboplatin (8/16/16- 9/23/16, 3 cycles), Carboplatin/Doxil (9/23/16-12/1/16), Gemcitabine (12/22/16-3/26/18), Zejula (9/2018-11/7/18), Carboplatin/Doxil (1/7/19-2/21/20, 14 cycles), Carboplatin only (4/17/20-5/15/20), Topotecan 8/6/20-11/13/20), Alimta (12/30/20), Cisplatin/Gemzar (3/12/21-4/30/21).  She is currently on oral etoposide.      06/14/21  Hematology/Oncology was consulted for management of ovarian cancer with oral Etoposide chemotherapy.      She  has a past medical history of Anemia (2013), Cervical disc disorder (2013), Clotting disorder (CMS/HCC) (2013), Colon polyp (2013), Deep vein thrombosis (CMS/HCC) (2013), Diverticulosis (2013), GERD (gastroesophageal reflux disease) (2016), HL (hearing loss) (2016), Hyperlipidemia (2013), Hypertension, Joint pain (2013), Low back pain (2013), Neuromuscular disorder (CMS/HCC) (2015), Osteopenia (2014), Osteoporosis, Ovarian cancer (CMS/HCC), Ovarian cancer on left (CMS/HCC) (1995), Pneumonia (2010), Spinal stenosis (2013), and Urinary tract infection (2013).    PCP: Noemy Queen MD    History:  Past Medical History:   Diagnosis Date   • Anemia 2013   • Cervical disc disorder 2013    Herniated disc, pinched nerve   • Clotting disorder (CMS/HCC) 2013    Low platelets from chemo   • Colon polyp 2013   • Deep vein thrombosis (CMS/HCC) 2013   • Diverticulosis 2013   • GERD (gastroesophageal reflux disease) 2016   • HL (hearing loss) 2016    I need hearing aids   • Hyperlipidemia 2013    Need my cholesterol rechecked   • Hypertension    • Joint pain 2013    Shoulder pain, torn rotator cuff   • Low back pain 2013   • Neuromuscular disorder (CMS/HCC) 2015    Shingles, pinched nerves in my neck   • Osteopenia 2014   • Osteoporosis    • Ovarian cancer (CMS/HCC)     ovarian--  spread to lungs 10/2020   • Ovarian cancer on left  (CMS/[facility]) 1995   • Pneumonia 2010    Have had it several times   • Spinal stenosis 2013    Cervical   • Urinary tract infection 2013    Have had several utis   ,   Past Surgical History:   Procedure Laterality Date   • BRONCHOSCOPY N/A 2/10/2021    Procedure: BRONCHOSCOPY with bronchioalveolar lavage;  Surgeon: Jerica Esparza MD;  Location: [facility] ENDOSCOPY;  Service: Pulmonary;  Laterality: N/A;  post op:right lobe pneumonia   • CATARACT EXTRACTION     • COLON SURGERY     • COLONOSCOPY  05/26/2018   • EXPLORATORY LAPAROTOMY     • HERNIA REPAIR     • HYSTERECTOMY     • OOPHORECTOMY     ,   Family History   Problem Relation Age of Onset   • Hypertension Mother    • Cancer Father    • Hypertension Father    • Hypertension Sister    • Hypertension Brother    • Stroke Brother    ,   Social History     Tobacco Use   • Smoking status: Never Smoker   • Smokeless tobacco: Never Used   Vaping Use   • Vaping Use: Never used   Substance Use Topics   • Alcohol use: No     Comment: caffeine 32-64oz qd   • Drug use: No   ,   Facility-Administered Medications Prior to Admission   Medication Dose Route Frequency Provider Last Rate Last Admin   • theophylline (PEG-24) 24 hr capsule 300 mg  300 mg Oral Q24H Jerica Esparza MD         Medications Prior to Admission   Medication Sig Dispense Refill Last Dose   • acetaminophen (TYLENOL) 500 MG tablet Take 1,000 mg by mouth Every 6 (Six) Hours As Needed for Mild Pain .      • albuterol sulfate  (90 Base) MCG/ACT inhaler Inhale 2 puffs Every 4 (Four) Hours As Needed for Wheezing. 18 g 0    • atorvastatin (LIPITOR) 20 MG tablet Take 20 mg by mouth every night at bedtime.  3    • calcium carbonate (Calcium 600) 600 MG tablet Take 1 tablet by mouth 2 (two) times a day. 60 tablet 11    • cetirizine (zyrTEC) 10 MG tablet Take 1 tablet by mouth Every Night. 30 tablet 0    • cyanocobalamin 1000 MCG/ML injection Inject 1,000 mcg into the appropriate muscle as directed by prescriber Every 28  (Twenty-Eight) Days.      • dexamethasone (DECADRON) 4 MG tablet Take 2 tablets in the morning daily on days 2, 3 & 4.  Take with food. 6 tablet 5    • etoposide (TOPOSAR;VEPESID) 50 MG chemo capsule Take 1 capsule (50mg) by mouth every other day, alternate with 2 caps (100mg) every other day for 21 days, off 7 days.  Swallow whole. Refrigerate. 31 capsule 2    • famotidine (PEPCID) 40 MG tablet TAKE 1 TABLET BY MOUTH EVERY MORNING BEFORE BREAKFAST 90 tablet 1    • folic acid (FOLVITE) 1 MG tablet Take 1,000 mcg by mouth Daily.      • HYDROcodone-homatropine (HYCODAN) 5-1.5 MG/5ML syrup Take 5 mL by mouth 2 (Two) Times a Day As Needed for Cough. 300 mL 0    • MAGnesium-Oxide 400 (241.3 Mg) MG tablet tablet Take 400 mg by mouth 2 (Two) Times a Day.      • montelukast (SINGULAIR) 10 MG tablet TAKE 1 TABLET BY MOUTH EVERY NIGHT 30 tablet 2    • ondansetron (ZOFRAN) 8 MG tablet Take 1 tablet by mouth 3 (Three) Times a Day As Needed for Nausea or Vomiting. 30 tablet 5    • oxyCODONE (ROXICODONE) 10 MG tablet Take 1 tablet by mouth Every 4 (Four) Hours As Needed for Moderate Pain . 180 tablet 0    • potassium chloride (K-DUR,KLOR-CON) 20 MEQ CR tablet Take 2 tablets by mouth Daily. 60 tablet 3    • pregabalin (LYRICA) 100 MG capsule Take 1 capsule by mouth 3 (Three) Times a Day. 90 capsule 2    • sertraline (ZOLOFT) 50 MG tablet Take 50 mg by mouth Every Night.      • temazepam (RESTORIL) 30 MG capsule TAKE 1 CAPSULE BY MOUTH AT NIGHT AS NEEDED FOR SLEEP 30 capsule 1    • triamterene-hydrochlorothiazide (DYAZIDE) 37.5-25 MG per capsule TAKE 1 CAPSULE BY MOUTH EVERY DAY 90 capsule 1    • warfarin (COUMADIN) 1 MG tablet Take 1 tablet by mouth Daily. At 1700 as directed 30 tablet 3    , Scheduled Meds:  acetylcysteine, 2 mL, Nebulization, BID - RT  atorvastatin, 20 mg, Oral, Daily  calcium 500 mg vitamin D 5 mcg (200 UT), 1 tablet, Oral, BID  cetirizine, 10 mg, Oral, Nightly  folic acid, 1,000 mcg, Oral,  Daily  ipratropium-albuterol, 3 mL, Nebulization, Q4H - RT  magnesium oxide, 400 mg, Oral, BID  methylPREDNISolone sodium succinate, 40 mg, Intravenous, Q8H  montelukast, 10 mg, Oral, Nightly  pantoprazole, 40 mg, Oral, BID AC  potassium chloride, 40 mEq, Oral, Daily  pregabalin, 100 mg, Oral, TID  sertraline, 50 mg, Oral, Nightly  sodium chloride, 10 mL, Intravenous, Q12H  theophylline, 300 mg, Oral, Q24H  triamterene-hydrochlorothiazide, 1 tablet, Oral, Daily    , Continuous Infusions:  Pharmacy to dose warfarin,   sodium chloride, 100 mL/hr, Last Rate: 100 mL/hr (06/14/21 0045)    , PRN Meds:  •  acetaminophen **OR** acetaminophen **OR** acetaminophen  •  albuterol  •  aluminum-magnesium hydroxide-simethicone  •  HYDROcodone-homatropine  •  magnesium sulfate **OR** magnesium sulfate in D5W 1g/100mL (PREMIX)  •  melatonin  •  nitroglycerin  •  ondansetron **OR** ondansetron  •  oxyCODONE  •  Pharmacy to dose warfarin  •  potassium chloride  •  sodium chloride  •  [COMPLETED] Insert peripheral IV **AND** sodium chloride  •  temazepam   Allergies:  Morphine, Penicillin v potassium, Sulfa antibiotics, Levaquin [levofloxacin], and Amoxicillin    Subjective     ROS:  Review of Systems   Constitutional: Negative for chills and fever.   HENT: Positive for congestion. Negative for mouth sores.    Eyes: Negative for pain and discharge.   Respiratory: Positive for cough and shortness of breath.         Dyspnea on exertion   Cardiovascular: Positive for chest pain.   Gastrointestinal: Negative for abdominal pain, constipation, diarrhea and nausea.   Endocrine: Negative for cold intolerance and heat intolerance.   Genitourinary: Negative for difficulty urinating.   Musculoskeletal: Positive for back pain, joint swelling and myalgias.        Joint pain   Allergic/Immunologic: Negative.    Neurological: Positive for weakness.   Hematological: Bruises/bleeds easily.   Psychiatric/Behavioral: Negative for agitation and behavioral  "problems.    I have reviewed and confirmed the accuracy of the ROS as documented by the MA/LPN/RN Cindy Ball MD      Objective   Vital Signs:   /59 (BP Location: Left arm, Patient Position: Lying)   Pulse 67   Temp 97.7 °F (36.5 °C) (Oral)   Resp 17   Ht 165.1 cm (65\")   Wt 80.2 kg (176 lb 14.4 oz)   LMP  (LMP Unknown)   SpO2 95%   BMI 29.44 kg/m²     Physical Exam: (performed by MD)  Physical Exam  Constitutional:       Appearance: She is ill-appearing.   HENT:      Head: Normocephalic and atraumatic.      Nose: Congestion present.   Eyes:      Extraocular Movements: Extraocular movements intact.      Conjunctiva/sclera: Conjunctivae normal.   Cardiovascular:      Rate and Rhythm: Regular rhythm. Bradycardia present.      Pulses: Normal pulses.      Heart sounds: Normal heart sounds.   Pulmonary:      Breath sounds: Wheezing and rhonchi present.      Comments: Accessory muscle use  Decreased air movement  Rhonchi and wheezes throughout  Abdominal:      General: Bowel sounds are normal.      Palpations: Abdomen is soft.   Musculoskeletal:         General: Normal range of motion.      Cervical back: Normal range of motion.   Skin:     General: Skin is warm and dry.      Capillary Refill: Capillary refill takes 2 to 3 seconds.      Findings: Bruising present.   Neurological:      Mental Status: She is alert and oriented to person, place, and time.      Motor: Weakness present.   Psychiatric:         Mood and Affect: Mood normal.         Behavior: Behavior normal.      Comments: Anxious     I have reexamined the patient and the results are consistent with the previously documented exam. Cindy Ball MD     Results Review:  Lab Results (last 48 hours)     Procedure Component Value Units Date/Time    Troponin [[id number]]  (Normal) Collected: 06/14/21 0557    Specimen: Blood Updated: 06/14/21 0702     Troponin T <0.010 ng/mL     Narrative:      Troponin T Reference Range:  <= 0.03 " ng/mL-   Negative for AMI  >0.03 ng/mL-     Abnormal for myocardial necrosis.  Clinicians would have to utilize clinical acumen, EKG, Troponin and serial changes to determine if it is an Acute Myocardial Infarction or myocardial injury due to an underlying chronic condition.       Results may be falsely decreased if patient taking Biotin.      Protime-INR [[id number]]  (Abnormal) Collected: 06/14/21 0410    Specimen: Blood Updated: 06/14/21 0511     Protime 44.1 Seconds      INR 4.30    Basic Metabolic Panel [[id number]]  (Abnormal) Collected: 06/14/21 0410    Specimen: Blood Updated: 06/14/21 0459     Glucose 132 mg/dL      BUN 20 mg/dL      Creatinine 1.06 mg/dL      Sodium 136 mmol/L      Potassium 3.8 mmol/L      Chloride 100 mmol/L      CO2 25.0 mmol/L      Calcium 8.6 mg/dL      eGFR Non African Amer 52 mL/min/1.73      BUN/Creatinine Ratio 18.9     Anion Gap 11.0 mmol/L     Narrative:      GFR Normal >60  Chronic Kidney Disease <60  Kidney Failure <15      Magnesium [[id number]]  (Normal) Collected: 06/14/21 0410    Specimen: Blood Updated: 06/14/21 0459     Magnesium 1.7 mg/dL     CBC Auto Differential [[id number]]  (Abnormal) Collected: 06/14/21 0410    Specimen: Blood Updated: 06/14/21 0436     WBC 3.60 10*3/mm3      RBC 3.36 10*6/mm3      Hemoglobin 9.7 g/dL      Hematocrit 29.3 %      MCV 87.4 fL      MCH 28.8 pg      MCHC 33.0 g/dL      RDW 18.4 %      RDW-SD 56.4 fl      MPV 8.1 fL      Platelets 136 10*3/mm3      Neutrophil % 80.8 %      Lymphocyte % 16.3 %      Monocyte % 0.9 %      Eosinophil % 1.9 %      Basophil % 0.1 %      Neutrophils, Absolute 2.90 10*3/mm3      Lymphocytes, Absolute 0.60 10*3/mm3      Monocytes, Absolute 0.00 10*3/mm3      Eosinophils, Absolute 0.10 10*3/mm3      Basophils, Absolute 0.00 10*3/mm3      nRBC 0.0 /100 WBC     Troponin [[id number]]  (Normal) Collected: 06/14/21 0007    Specimen: Blood Updated: 06/14/21 0038     Troponin T <0.010 ng/mL     Narrative:      Troponin T  Reference Range:  <= 0.03 ng/mL-   Negative for AMI  >0.03 ng/mL-     Abnormal for myocardial necrosis.  Clinicians would have to utilize clinical acumen, EKG, Troponin and serial changes to determine if it is an Acute Myocardial Infarction or myocardial injury due to an underlying chronic condition.       Results may be falsely decreased if patient taking Biotin.      Protime-INR [[id number]]  (Abnormal) Collected: 06/14/21 0007    Specimen: Blood Updated: 06/14/21 0036     Protime >90.0 Seconds      INR >8.00    COVID PRE-OP / PRE-PROCEDURE SCREENING ORDER (NO ISOLATION) - Swab, Nasopharynx [[id number]] Collected: 06/13/21 2332    Specimen: Swab from Nasopharynx Updated: 06/13/21 2334    Narrative:      The following orders were created for panel order COVID PRE-OP / PRE-PROCEDURE SCREENING ORDER (NO ISOLATION) - Swab, Nasopharynx.  Procedure                               Abnormality         Status                     ---------                               -----------         ------                     COVID-19,APTIMA PANTHER,...[[id number]]                      In process                   Please view results for these tests on the individual orders.    COVID-19,APTIMA PANTHER,[facility] IN-HOUSE, NP/OP SWAB IN UTM/VTM/SALINE TRANSPORT MEDIA,24 HR TAT - Swab, Nasopharynx [[id number]] Collected: 06/13/21 2332    Specimen: Swab from Nasopharynx Updated: 06/13/21 2334    [place] Draw [[id number]] Collected: 06/13/21 1930    Specimen: Blood Updated: 06/13/21 2031    Narrative:      The following orders were created for panel order [place] Draw.  Procedure                               Abnormality         Status                     ---------                               -----------         ------                     Green Top (Gel)[[id number]]                                  Final result               Lavender Top[[id number]]                                     Final result               Gold Top - SST[[id number]]                                    Final result                 Please view results for these tests on the individual orders.    Gold Top - SST [[id number]] Collected: 06/13/21 1930    Specimen: Blood Updated: 06/13/21 2031     Extra Tube Hold for add-ons.     Comment: Auto resulted.       Lavender Top [[id number]] Collected: 06/13/21 1930    Specimen: Blood Updated: 06/13/21 2031     Extra Tube hold for add-on     Comment: Auto resulted       aPTT [[id number]]  (Abnormal) Collected: 06/13/21 1930    Specimen: Blood Updated: 06/13/21 2013     PTT 54.8 seconds     Protime-INR [[id number]]  (Abnormal) Collected: 06/13/21 1930    Specimen: Blood Updated: 06/13/21 2013     Protime 75.3 Seconds      INR 7.54    Green Top (Gel) [[id number]] Collected: 06/13/21 1930    Specimen: Blood Updated: 06/13/21 2001     Extra Tube HOLD    Comprehensive Metabolic Panel [[id number]]  (Abnormal) Collected: 06/13/21 1930    Specimen: Blood Updated: 06/13/21 1956     Glucose 96 mg/dL      BUN 20 mg/dL      Creatinine 1.11 mg/dL      Sodium 135 mmol/L      Potassium 4.4 mmol/L      Chloride 98 mmol/L      CO2 24.0 mmol/L      Calcium 8.9 mg/dL      Total Protein 7.0 g/dL      Albumin 3.70 g/dL      ALT (SGPT) 6 U/L      AST (SGOT) 15 U/L      Alkaline Phosphatase 121 U/L      Total Bilirubin 0.3 mg/dL      eGFR Non African Amer 49 mL/min/1.73      Globulin 3.3 gm/dL      A/G Ratio 1.1 g/dL      BUN/Creatinine Ratio 18.0     Anion Gap 13.0 mmol/L     Narrative:      GFR Normal >60  Chronic Kidney Disease <60  Kidney Failure <15      Troponin [[id number]]  (Normal) Collected: 06/13/21 1930    Specimen: Blood Updated: 06/13/21 1956     Troponin T <0.010 ng/mL     Narrative:      Troponin T Reference Range:  <= 0.03 ng/mL-   Negative for AMI  >0.03 ng/mL-     Abnormal for myocardial necrosis.  Clinicians would have to utilize clinical acumen, EKG, Troponin and serial changes to determine if it is an Acute Myocardial Infarction or myocardial injury due to an  underlying chronic condition.       Results may be falsely decreased if patient taking Biotin.      CBC & Differential [[id number]]  (Abnormal) Collected: 06/13/21 1930    Specimen: Blood Updated: 06/13/21 1939    Narrative:      The following orders were created for panel order CBC & Differential.  Procedure                               Abnormality         Status                     ---------                               -----------         ------                     CBC Auto Differential[[id number]]        Abnormal            Final result                 Please view results for these tests on the individual orders.    CBC Auto Differential [[id number]]  (Abnormal) Collected: 06/13/21 1930    Specimen: Blood Updated: 06/13/21 1939     WBC 4.00 10*3/mm3      RBC 3.34 10*6/mm3      Hemoglobin 9.9 g/dL      Hematocrit 29.4 %      MCV 88.1 fL      MCH 29.6 pg      MCHC 33.5 g/dL      RDW 19.0 %      RDW-SD 58.2 fl      MPV 8.7 fL      Platelets 172 10*3/mm3      Neutrophil % 74.9 %      Lymphocyte % 20.2 %      Monocyte % 1.0 %      Eosinophil % 3.6 %      Basophil % 0.3 %      Neutrophils, Absolute 3.00 10*3/mm3      Lymphocytes, Absolute 0.80 10*3/mm3      Monocytes, Absolute 0.00 10*3/mm3      Eosinophils, Absolute 0.10 10*3/mm3      Basophils, Absolute 0.00 10*3/mm3      nRBC 0.0 /100 WBC            Pending Results:     Imaging Reviewed:   XR Chest 1 View    Result Date: 6/13/2021    1.  New hazy right upper lobe airspace disease suspicious for pneumonia. 2.  No change in hazy bibasilar airspace disease likely due to chronic lung disease.   Electronically Signed By-Reid Nelson MD On:6/13/2021 8:11 PM This report was finalized on [id number] by  Reid Nelson MD.    CT Chest Pulmonary Embolism    Result Date: 6/13/2021  1. Interval progression of extensive osseous metastatic disease. Findings are consistent with progression of metastatic neoplasm. 2. Worsening asymmetric interlobular septal thickening  throughout the right lung with associated reticulonodular and miliary type nodular airspace disease. 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 This has worsened. Similar findings are identified in the left lower lobe as well. 3. Mediastinal and subcarinal adenopathy. This is felt to be metastatic and is essentially stable given differences in technique. 4. Bronchial wall thickening. There are subtle areas of mucous plugging involving multiple bronchioles to the right lower lobe. Correlate for bronchiolitis, reactive airways lung disease or possibly aspiration. 5. Small hiatal hernia. 6. No pulmonary embolus. Slot 63 Electronically signed by:  Reyes Agarwal M.D.  6/13/2021 6:50 PM           Assessment/Plan   ASSESSMENT  1. Recurrent Ovarian cancer with metastases to bone/brain/colon/Lung: Patient established care in June 2013 with history of adenocarcinoma of ovary diagnosed 10/31/1995. Six months prior to establishment of care in June 2013, the patient felt mass in abdomen and had rectal bleeding. CT guided biopsy 6/27/13 revealed metastatic papillary adenocarcinoma consistent with ovarian cancer.  She is currently taking oral Etoposide daily.   MRI (12/15/20) showed progressive brain mets. CT chest (5/6/21) showed progression of disease to lungs.  Currently on oral etoposide.  CT of the chest shows clear progression.  Discussed options of care including hospice palliative care, consideration of additional chemotherapy with  Doxil plus or minus platinum.  Patient had responded to Doxil but she was nearing the maximum cardiac tolerated dose therefore treatment was discontinued.    2. HX DVT: Left upper extremity diagnosed 10/2013. Lovenox started 10/13/13 to bridge until Coumadin 5 mg therapeutic. Currently followed with PT/INR to keep Coumadin therapeutic level. INR 4.3 today.  Will check daily PT/INR    3. Anemia: Diagnosed with pernicious anemia in 3/2016. Received Procrit, ferrous  sulfate, Injectafer in past. Currently receiving parenteral B12 injections. Hgb 9.7 today.  Continue B12 injections in the outpatient setting    4. Supratherapeutic INR: Received Vitamin K in ER for INR of 7.54.     5. Dyspnea: Likely secondary to lymphangitic spread of malignancy.  No evidence of PE on her recent CT chest.  Pulmonology following.        PLAN  1. Consult palliative care for progressive disease.  Reviewed with patient.  She still wants to consider palliative chemotherapy.  Lately she has progressed through multiple lines of treatment.  2. Monitor INR and adjust Coumadin as needed per protocol  3. Plan bronchoscopy when INR below 2  4. Monitor CBC, CMP  5. Hold Coumadin  6. Monitor for bleeding    Electronically signed by IRENA Gomez, 06/14/21, 12:11 PM EDT.      Thank you for this consult. We will be happy to follow along with you.     Patient seen and examined.  Face-to-face evaluation completed.  Note reviewed and edited.  Discussed with nurse practitioner.  Agree with assessment and plan as documented above.  Patient has clearly progressive disease in the lungs.  Agree with hospice palliative care.  She is still interested in chemotherapy.  If so could consider Doxil plus or minus platinum will be considered.  Monitor INR.  No evidence of bleeding.  Hold etoposide due to progressive disease.  Discussed with patient.      Electronically signed by Cindy Ball MD, 06/15/21, 7:22 AM EDT.

## 2021-06-14 NOTE — ED PROVIDER NOTES
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  She is currently on oral chemotherapy.  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  She presents today with complaints of chest pain and shortness of breath.  She states that started today while she was doing laundry.  She reports a sudden onset in the center of her chest.  She states now it is starting to radiate into the right side of her chest.  She denies any neck arm or jaw pain.  She states it causes her to feel short of breath.  She denies any alleviating or exacerbating factors.  She denies any recent illness, fever chills cough or congestion.  She denies any nausea or sweats.  She reports no prior cardiac history.          Review of Systems   Constitutional: Negative for chills and fever.   HENT: Negative for congestion.    Respiratory: Positive for shortness of breath. Negative for cough.    Cardiovascular: Positive for chest pain. Negative for leg swelling.   Gastrointestinal: Negative for abdominal pain, nausea and vomiting.   Genitourinary: Negative for decreased urine volume and dysuria.   Musculoskeletal: Negative for neck pain.   Neurological: Negative for dizziness and light-headedness.       Past Medical History:   Diagnosis Date   • Anemia 2013   • Cervical disc disorder 2013    Herniated disc, pinched nerve   • Clotting disorder (CMS/HCC) 2013    Low platelets from chemo   • Colon polyp 2013   • Deep vein thrombosis (CMS/HCC) 2013   • Diverticulosis 2013   • GERD (gastroesophageal reflux disease) 2016   • HL (hearing loss) 2016    I need hearing aids   • Hyperlipidemia 2013    Need my cholesterol rechecked   • Hypertension    • Joint pain 2013    Shoulder pain, torn rotator cuff   • Low back pain 2013   • Neuromuscular disorder (CMS/HCC) 2015    Shingles, pinched nerves in my neck   • Osteopenia 2014   • Osteoporosis     • Ovarian cancer (CMS/HCC)     ovarian--  spread to lungs 10/2020   • Ovarian cancer on left (CMS/HCC) 1995   • Pneumonia 2010    Have had it several times   • Spinal stenosis 2013    Cervical   • Urinary tract infection 2013    Have had several utis       Allergies   Allergen Reactions   • Morphine Nausea Only and Hives   • Penicillin V Potassium Rash   • Sulfa Antibiotics Rash   • Levaquin [Levofloxacin] Nausea Only and Dizziness     When taken with Coumadin   • Amoxicillin Rash       Past Surgical History:   Procedure Laterality Date   • BRONCHOSCOPY N/A 2/10/2021    Procedure: BRONCHOSCOPY with bronchioalveolar lavage;  Surgeon: Jerica Esparza MD;  Location: [facility] ENDOSCOPY;  Service: Pulmonary;  Laterality: N/A;  post op:right lobe pneumonia   • CATARACT EXTRACTION     • COLON SURGERY     • COLONOSCOPY  05/26/2018   • EXPLORATORY LAPAROTOMY     • HERNIA REPAIR     • HYSTERECTOMY     • OOPHORECTOMY         Family History   Problem Relation Age of Onset   • Hypertension Mother    • Cancer Father    • Hypertension Father    • Hypertension Sister    • Hypertension Brother    • Stroke Brother        Social History     Socioeconomic History   • Marital status:      Spouse name: Not on file   • Number of children: Not on file   • Years of education: Not on file   • Highest education level: Not on file   Tobacco Use   • Smoking status: Never Smoker   • Smokeless tobacco: Never Used   Vaping Use   • Vaping Use: Never used   Substance and Sexual Activity   • Alcohol use: No     Comment: caffeine 32-64oz qd   • Drug use: No   • Sexual activity: Not Currently     Partners: Male     Birth control/protection: Surgical           Objective   Physical Exam  Vital signs and triage nurse note reviewed.  Constitutional: Awake, alert; well-developed and well-nourished. No acute distress is noted.  HEENT: Normocephalic, atraumatic; pupils are PERRL with intact EOM; oropharynx is pink and moist without exudate or erythema.  No  drooling or pooling of oral secretions.  Neck: Supple, full range of motion without pain; no cervical lymphadenopathy. Normal phonation.  Cardiovascular: Regular rate and rhythm, normal S1-S2.  No murmur noted.  There are some mild tenderness over the center of the chest without crepitus.  Pulmonary: Respiratory effort regular nonlabored, breath sounds clear to auscultation all fields.  Abdomen: Soft, nontender, nondistended with normoactive bowel sounds; no rebound or guarding.  Musculoskeletal: Independent range of motion of all extremities with no palpable tenderness or edema.  Calves are symmetric and nontender.  Neuro: Alert oriented x3, speech is clear and appropriate, GCS 15.    Skin: Flesh tone, warm, dry, intact; no erythematous or petechial rash or lesion.      Procedures           ED Course      Labs Reviewed   COMPREHENSIVE METABOLIC PANEL - Abnormal; Notable for the following components:       Result Value    Creatinine 1.11 (*)     Sodium 135 (*)     Alkaline Phosphatase 121 (*)     eGFR Non  Amer 49 (*)     All other components within normal limits    Narrative:     GFR Normal >60  Chronic Kidney Disease <60  Kidney Failure <15     PROTIME-INR - Abnormal; Notable for the following components:    Protime 75.3 (*)     INR 7.54 (*)     All other components within normal limits   APTT - Abnormal; Notable for the following components:    PTT 54.8 (*)     All other components within normal limits   CBC WITH AUTO DIFFERENTIAL - Abnormal; Notable for the following components:    RBC 3.34 (*)     Hemoglobin 9.9 (*)     Hematocrit 29.4 (*)     RDW 19.0 (*)     RDW-SD 58.2 (*)     Monocyte % 1.0 (*)     Monocytes, Absolute 0.00 (*)     All other components within normal limits   TROPONIN (IN-HOUSE) - Normal    Narrative:     Troponin T Reference Range:  <= 0.03 ng/mL-   Negative for AMI  >0.03 ng/mL-     Abnormal for myocardial necrosis.  Clinicians would have to utilize clinical acumen, EKG, Troponin and  serial changes to determine if it is an Acute Myocardial Infarction or myocardial injury due to an underlying chronic condition.       Results may be falsely decreased if patient taking Biotin.     RAINBOW DRAW    Narrative:     The following orders were created for panel order [facility] Draw.  Procedure                               Abnormality         Status                     ---------                               -----------         ------                     Green Top (Gel)[[id number]]                                  Final result               Lavender Top[[id number]]                                     Final result               Gold Top - SST[[id number]]                                   Final result                 Please view results for these tests on the individual orders.   GREEN TOP   LAVENDER TOP   GOLD TOP - SST   CBC AND DIFFERENTIAL    Narrative:     The following orders were created for panel order CBC & Differential.  Procedure                               Abnormality         Status                     ---------                               -----------         ------                     CBC Auto Differential[[id number]]        Abnormal            Final result                 Please view results for these tests on the individual orders.     XR Chest 1 View    Result Date: 6/13/2021    1.  New hazy right upper lobe airspace disease suspicious for pneumonia. 2.  No change in hazy bibasilar airspace disease likely due to chronic lung disease.   Electronically Signed By-Reid Nelson MD On:6/13/2021 8:11 PM This report was finalized on [id number] by  Reid Nelson MD.    Medications   sodium chloride 0.9 % flush 10 mL (has no administration in time range)   sodium chloride 0.9 % flush 10 mL (has no administration in time range)   HYDROmorphone (DILAUDID) injection 0.5 mg (0.5 mg Intravenous Given 6/13/21 2021)   sodium chloride 0.9 % bolus 500 mL (0 mL Intravenous Stopped 6/13/21 2134)   iopamidol  (ISOVUE-370) 76 % injection 100 mL (91 mL Intravenous Given 6/13/21 2036)   vitamin K1 (PHYTONADIONE) 1 MG/1 ML oral solution 5 mg (5 mg Oral Given 6/13/21 2134)                                          MDM  Number of Diagnoses or Management Options  Diagnosis management comments: Comorbidities: Hypertension, high cholesterol, ovarian cancer with metastasis to the lungs bone and brain, DVT PE on warfarin  Differentials: Cardiac ischemia, ACS, pneumothorax, pneumonia, pleural effusion, pleurisy, worsening cancer;this list is not all inclusive and does not constitute the entirety of considered causes  Discussion with provider:  Radiology interpretation: X-rays reviewed by me and interpreted by radiologist: As above  Lab interpretation: Labs viewed by me significant for: As above    Patient was placed on continuous cardiac monitor.  She had IV established.  She had labs, EKG chest x-ray obtained.  She had aspirin prior to arrival.  She currently rates her pain at an 8 out of 10.  She was given Dilaudid for pain.    Work-up: EKG reviewed by me and interpreted by Dr. Acosta shows sinus bradycardia with ventricular rate of 53.  No acute ST or T wave changes.  CBC reveals hemoglobin of 9.9 but appears to be at baseline for patient.  Metabolic panel is grossly unremarkable.  Negative troponin.  INR 7.5.  Chest x-ray shows New hazy right upper lobe airspace disease suspicious for pneumonia. 2.  No change in hazy bibasilar airspace disease likely due to chronic lung disease.  CT PE protocol showed Interval progression of extensive osseous metastatic disease. Findings are consistent with progression of metastatic neoplasm.  2. Worsening asymmetric interlobular septal thickening throughout the right lung with associated reticulonodular and miliary type nodular airspace disease. 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 This has worsened. Similar   findings are identified in the left lower lobe as  well.  3. Mediastinal and subcarinal adenopathy. This is felt to be metastatic and is essentially stable given differences in technique.  4. Bronchial wall thickening. There are subtle areas of mucous plugging involving multiple bronchioles to the right lower lobe. Correlate for bronchiolitis, reactive airways lung disease or possibly aspiration.  5. Small hiatal hernia.  6. No pulmonary embolus.    Patient was given vitamin K 5 mg orally.  On reexamination she is resting comfortably.  She is in no distress.  She has no new complaints.  She has maintained stable vital signs throughout her ED stay.  Her chest pain or shortness of breath could be related to her worsening cancer however she reports she has never had cardiac evaluation.    She will be admitted to the hospital for observation, serial troponins and monitoring of her INR.  She was discussed with hospitalist nurse practitioner.    Diagnosis and treatment plan discussed with patient.  Patient agreeable to plan.          Amount and/or Complexity of Data Reviewed  Clinical lab tests: ordered and reviewed  Tests in the radiology section of CPT®: ordered and reviewed    Patient Progress  Patient progress: stable      Final diagnoses:   Chest pain, unspecified type   Shortness of breath   Poisoning by warfarin sodium, accidental or unintentional, initial encounter       ED Disposition  ED Disposition     ED Disposition Condition Comment    Decision to Admit            No follow-up provider specified.       Medication List      No changes were made to your prescriptions during this visit.          Mara Bowden, IRENA  06/13/21 8010

## 2021-06-15 NOTE — PROGRESS NOTES
Hematology/Oncology Inpatient Progress Note    PATIENT NAME: Tahira Zimmerman  : 1955  MRN: [id number]    CHIEF COMPLAINT: Chest pain    HISTORY OF PRESENT ILLNESS:    Tahira Zimmerman is a 65 y.o. female who presented to [facility] on 2021 with complaints of 10 of 10 chest pain that radiated to the right chest wall, and began that evening while doing laundry.  She denies any aggravating or alleviating factors.  Patient admitted to cough, dyspnea, congestion, hoarseness, weakness, joint pain.  The patient denied fever, chills, nausea, vomiting, palpitations.  Since her dyspnea did not improve she decided to go to the hospital.  Upon arrival to the ED, blood pressure 120/81, heart rate 54, respiratory rate 18, O2 sats 100% on room air.  Troponin less than 0.010, EKG revealed sinus bradycardia rate 53.  Chest x-ray revealed left subclavian Port-A-Cath in place, heart size at upper limits of normal, ill-defined right upper lobe airspace disease suspicious for pneumonia, hazy bibasilar airspace disease similar to the prior study likely due to chronic lung disease.  CT for PE protocol revealed internal progression of extensive osseous metastatic disease.  Findings are consistent with progression of metastatic neoplasm.  Worsening asymmetric interlobular septal thickening throughout the right lung with associated reticulonodular and miliary type nodular airspace disease. 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  Similar findings are identified in the left lower lobe.  Mediastinal and subcarinal adenopathy.  There are several areas of mucous plugging involving multiple bronchioles to the right lower lobe.  The patient has a past medical history of ovarian cancer with metastasis to the bone, brain, and lung currently on oral Etoposide chemotherapy.     She received multiple lines of chemotherapy treatments with Taxol/Carboplatin (10/10/13-14, 7 cycles) after initial  surgery to remove the cancer, Taxotere/Carboplatin (8/16/16- 9/23/16, 3 cycles), Carboplatin/Doxil (9/23/16-12/1/16), Gemcitabine (12/22/16-3/26/18), Zejula (9/2018-11/7/18), Carboplatin/Doxil (1/7/19-2/21/20, 14 cycles), Carboplatin only (4/17/20-5/15/20), Topotecan 8/6/20-11/13/20), Alimta (12/30/20), Cisplatin/Gemzar (3/12/21-4/30/21).  She is currently on oral etoposide.        06/14/21  Hematology/Oncology was consulted for management of ovarian cancer with oral Etoposide chemotherapy.     06/14/21 Echocardiogram reveals LVEF of 56-60%. No pericardial effusion.      She  has a past medical history of Anemia (2013), Cervical disc disorder (2013), Clotting disorder (CMS/HCC) (2013), Colon polyp (2013), Deep vein thrombosis (CMS/HCC) (2013), Diverticulosis (2013), GERD (gastroesophageal reflux disease) (2016), HL (hearing loss) (2016), Hyperlipidemia (2013), Hypertension, Joint pain (2013), Low back pain (2013), Neuromuscular disorder (CMS/HCC) (2015), Osteopenia (2014), Osteoporosis, Ovarian cancer (CMS/HCC), Ovarian cancer on left (CMS/HCC) (1995), Pneumonia (2010), Spinal stenosis (2013), and Urinary tract infection (2013).     PCP: Noemy Queen MD    History of present illness was reviewed and is unchanged from the previous visit. 06/15/21      Subjective      Patient just got back from bronchoscopy.  She does not have any new issues today.    ROS:  Review of Systems   Constitutional: Negative for chills, fatigue and fever.   HENT: Negative for congestion, mouth sores and sore throat.    Eyes: Negative for pain and discharge.   Respiratory: Positive for shortness of breath.    Cardiovascular:        Dyspnea on exertion   Gastrointestinal: Negative for abdominal pain, constipation, diarrhea and nausea.   Endocrine: Negative for cold intolerance and heat intolerance.   Genitourinary: Negative for dysuria.   Musculoskeletal: Negative for arthralgias and joint swelling.   Skin: Negative for pallor.    Allergic/Immunologic: Negative.    Neurological: Negative for dizziness and headaches.   Hematological: Does not bruise/bleed easily.        No bleeding noted   Psychiatric/Behavioral: Negative for agitation and confusion.      I have reviewed and confirmed the accuracy of the ROS as documented by the MA/LPN/RN Cindy Ball MD    MEDICATIONS:    Scheduled Meds:  acetylcysteine, 2 mL, Nebulization, BID - RT  atorvastatin, 20 mg, Oral, Daily  calcium 500 mg vitamin D 5 mcg (200 UT), 1 tablet, Oral, BID  cetirizine, 10 mg, Oral, Nightly  folic acid, 1,000 mcg, Oral, Daily  ipratropium-albuterol, 3 mL, Nebulization, Q4H - RT  magnesium oxide, 400 mg, Oral, BID  methylPREDNISolone sodium succinate, 40 mg, Intravenous, Q8H  montelukast, 10 mg, Oral, Nightly  pantoprazole, 40 mg, Oral, BID AC  potassium chloride, 40 mEq, Oral, Daily  pregabalin, 100 mg, Oral, TID  sertraline, 50 mg, Oral, Nightly  sodium chloride, 10 mL, Intravenous, Q12H  theophylline, 300 mg, Oral, Q24H  triamterene-hydrochlorothiazide, 1 tablet, Oral, Daily       Continuous Infusions:  Pharmacy to dose warfarin,   sodium chloride, 100 mL/hr, Last Rate: 100 mL/hr (06/14/21 0045)       PRN Meds:  •  acetaminophen **OR** acetaminophen **OR** acetaminophen  •  albuterol  •  aluminum-magnesium hydroxide-simethicone  •  HYDROcodone-homatropine  •  magnesium sulfate **OR** magnesium sulfate in D5W 1g/100mL (PREMIX)  •  melatonin  •  nitroglycerin  •  ondansetron **OR** ondansetron  •  oxyCODONE  •  Pharmacy to dose warfarin  •  potassium chloride  •  sodium chloride  •  [COMPLETED] Insert peripheral IV **AND** sodium chloride  •  temazepam     ALLERGIES:    Allergies   Allergen Reactions   • Morphine Nausea Only and Hives   • Penicillin V Potassium Rash   • Sulfa Antibiotics Rash   • Levaquin [Levofloxacin] Nausea Only and Dizziness     When taken with Coumadin   • Amoxicillin Rash       Objective    VITALS:   /59 (BP Location: Left arm,  "Patient Position: Lying)   Pulse 61   Temp 97.7 °F (36.5 °C) (Oral)   Resp 18   Ht 165.1 cm (65\")   Wt 80.2 kg (176 lb 14.4 oz)   LMP  (LMP Unknown)   SpO2 96%   BMI 29.44 kg/m²     PHYSICAL EXAM: (performed by MD)  Physical Exam  Vitals and nursing note reviewed.   Constitutional:       General: She is not in acute distress.     Appearance: She is not diaphoretic.   HENT:      Head: Normocephalic and atraumatic.      Nose: Nose normal.      Mouth/Throat:      Mouth: Mucous membranes are moist.      Pharynx: Oropharynx is clear.   Eyes:      Extraocular Movements: Extraocular movements intact.      Conjunctiva/sclera: Conjunctivae normal.   Neck:      Comments: No thyromegaly  Cardiovascular:      Rate and Rhythm: Normal rate and regular rhythm.      Pulses: Normal pulses.      Heart sounds: Normal heart sounds. No friction rub. No gallop.    Pulmonary:      Effort: Pulmonary effort is normal.      Breath sounds: No stridor. Rhonchi present. No wheezing or rales.   Abdominal:      General: Bowel sounds are normal.      Palpations: Abdomen is soft. There is no mass.      Tenderness: There is no abdominal tenderness. There is no guarding or rebound.   Musculoskeletal:         General: Normal range of motion.      Cervical back: Normal range of motion and neck supple. No rigidity.   Skin:     General: Skin is warm and dry.      Capillary Refill: Capillary refill takes 2 to 3 seconds.      Findings: No erythema or rash.   Neurological:      Mental Status: She is alert and oriented to person, place, and time.   Psychiatric:         Mood and Affect: Mood normal.         Behavior: Behavior normal.     I have reexamined the patient and the results are consistent with the previously documented exam. Cindy Ball MD       RECENT LABS:  Lab Results (last 24 hours)     Procedure Component Value Units Date/Time    Protime-INR [[id number]]  (Abnormal) Collected: 06/15/21 0924    Specimen: Blood Updated: 06/15/21 " 1001     Protime 12.4 Seconds      INR 1.13    COVID PRE-OP / PRE-PROCEDURE SCREENING ORDER (NO ISOLATION) - Swab, Nasopharynx [[id number]]  (Normal) Collected: 06/13/21 2332    Specimen: Swab from Nasopharynx Updated: 06/14/21 1508    Narrative:      The following orders were created for panel order COVID PRE-OP / PRE-PROCEDURE SCREENING ORDER (NO ISOLATION) - Swab, Nasopharynx.  Procedure                               Abnormality         Status                     ---------                               -----------         ------                     COVID-19,APTIMA PANTHER,...[[id number]]  Normal              Final result                 Please view results for these tests on the individual orders.    COVID-19,APTIMA PANTHER,[facility] IN-HOUSE, NP/OP SWAB IN UTM/VTM/[facility] TRANSPORT MEDIA,24 HR TAT - Swab, Nasopharynx [[id number]]  (Normal) Collected: 06/13/21 2332    Specimen: Swab from Nasopharynx Updated: 06/14/21 1508     COVID19 Not Detected    Narrative:      Fact sheet for providers: https://www.fda.gov/media/084715/download     Fact sheet for patients: https://www.fda.gov/media/292820/download    Test performed by RT PCR.    Protime-INR [[id number]]  (Abnormal) Collected: 06/14/21 1410    Specimen: Blood Updated: 06/14/21 1455     Protime 16.3 Seconds      INR 1.51          PENDING RESULTS: Bronchoscopy    IMAGING REVIEWED:  XR Chest 1 View    Result Date: 6/13/2021    1.  New hazy right upper lobe airspace disease suspicious for pneumonia. 2.  No change in hazy bibasilar airspace disease likely due to chronic lung disease.   Electronically Signed By-Reid Nelson MD On:6/13/2021 8:11 PM This report was finalized on [id number] by  Reid Nelson MD.    CT Chest Pulmonary Embolism    Result Date: 6/13/2021  1. Interval progression of extensive osseous metastatic disease. Findings are consistent with progression of metastatic neoplasm. 2. Worsening asymmetric interlobular septal thickening throughout the right  lung with associated reticulonodular and miliary type nodular airspace disease. 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 This has worsened. Similar findings are identified in the left lower lobe as well. 3. Mediastinal and subcarinal adenopathy. This is felt to be metastatic and is essentially stable given differences in technique. 4. Bronchial wall thickening. There are subtle areas of mucous plugging involving multiple bronchioles to the right lower lobe. Correlate for bronchiolitis, reactive airways lung disease or possibly aspiration. 5. Small hiatal hernia. 6. No pulmonary embolus. Slot 63 Electronically signed by:  Reyes Agarwal M.D.  6/13/2021 6:50 PM      Assessment/Plan   ASSESSMENT:  1. Recurrent Ovarian cancer with metastases to bone/brain/colon/Lung: Patient established care in June 2013 with history of adenocarcinoma of ovary diagnosed 10/31/1995. Six months prior to establishment of care in June 2013, the patient felt mass in abdomen and had rectal bleeding. CT guided biopsy 6/27/13 revealed metastatic papillary adenocarcinoma consistent with ovarian cancer.  She is currently taking oral Etoposide daily.   MRI (12/15/20) showed progressive brain mets. CT chest (5/6/21) showed progression of disease to lungs.  Currently on oral etoposide.  CT of the chest shows clear progression.  Discussed options of care including hospice palliative care, consideration of additional chemotherapy with  Doxil plus or minus platinum.  Patient had responded to Doxil but she was reachinmg the maximum cardiac tolerated dose therefore treatment was discontinued.  She may consider this in the future     2. HX DVT: Left upper extremity diagnosed 10/2013. Lovenox started 10/13/13 to bridge until Coumadin 5 mg therapeutic. Currently followed with PT/INR to keep Coumadin therapeutic level. INR 1.13 today.  Will check daily PT/INR.  Reviewed     3. Anemia: Diagnosed with pernicious anemia in 3/2016.  Received Procrit, ferrous sulfate, Injectafer in past. Currently receiving parenteral B12 injections. Hgb 8.7 today.  Continue B12 injections in the outpatient setting     4. Supratherapeutic INR: Received Vitamin K in ER for INR of 7.54. INR 1.13 today.      5. Dyspnea: Likely secondary to lymphangitic spread of malignancy.  No evidence of PE on her recent CT chest.  Pulmonology following. Bronchoscopy this A.M. per pulmonologist    PLAN:  1. Consult palliative care for progressive disease.  Reviewed with patient.  She still wants to consider palliative chemotherapy.  Lately she has progressed through multiple lines of treatment. ACP provided. Patient will speak to daughter after discharge and make decisions regarding her advanced care plan.  We will continue to address this  2. Hold Etoposide due to progressive disease  3. Monitor PT/INR daily  4. Monitor CBC, CMP  5. Hold Coumadin for impending bronchoscopy in A.M.  6. Monitor for bleeding  7. Anticipate bronchoscopy this A.M.          Electronically signed by IRENA Gomez, 06/15/21, 10:53 AM EDT.  Patient seen and examined.  Face-to-face evaluation completed.  Note reviewed and edited.  Discussed with nurse practitioner.  Agree to assessment and plan as documented above.  Patient may still consider outpatient palliative chemotherapy.  Await bronchoscopy findings.  Daily CBCs.  Discussed with patient.      Electronically signed by Cindy Ball MD, 06/15/21, 6:39 PM EDT.

## 2021-06-15 NOTE — PROGRESS NOTES
[facility] Daily Progress Note      Hospitalist Team  LOS 0 days      Patient Care Team:  Noemy Queen MD as PCP - General (Family Medicine)  Cindy Ball MD as Consulting Physician (Hematology and Oncology)    Patient Location: 220/1      Subjective   Subjective     Chief Complaint / Subjective  Chief Complaint   Patient presents with   • Chest Pain         Brief Synopsis of Hospital Course/HPI  Ms. Zimmerman is a 65 y.o. 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 Patient states 10/10 sharp midsternal chest pain with radiation to right chest wall began this evening while doing laundry, no aggravating or alleviating factors noted.  Patient admits to cough, dyspnea, congestion, hoarseness, weakness, joint pain.  Patient denies fever, chills, nausea, vomiting, palpitations.  As dyspnea did not improve she decided to go to [facility] ED     On arrival to the ED vitals temp 98.3, HR 54, RR 18, /81, O2 sats 100% on room air.  Troponin less than 0.010, glucose 96, , K4.4, CO2 24, creatinine 1.11, BUN 20, alkaline phosphatase 121, ALT 6, AST 15, total bili 0.3, INR 7.54, PTT 75.3, PTT 54.8, WBC 4.0, Hgb 9.9, platelets 172, neutrophils 74.9.  EKG shows sinus bradycardia rate 53 when compared with EKG of 03/18/2021 significant repolarization change.  Chest x-ray shows left subclavian Port-A-Cath in place, heart size at the upper limits of normal, ill-defined right upper lobe airspace disease suspicious for pneumonia, hazy bibasilar airspace disease similar to prior study likely due to chronic lung disease.  CT for PE protocol shows interval progression of extensive osseous metastatic disease. Findings are consistent with progression of metastatic neoplasm. Worsening asymmetric  "interlobular septal thickening throughout the right lung with associated reticulonodular and miliary type nodular airspace disease. 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 This has worsened. Similar  findings are identified in the left lower lobe as well. Mediastinal and subcarinal adenopathy. This is felt to be metastatic and is essentially stable given differences in technique. Bronchial wall thickening. There are subtle areas of mucous plugging involving multiple bronchioles to the right lower lobe. Correlate for bronchiolitis, reactive airways lung disease or possibly aspiration. Small hiatal hernia. No pulmonary embolus.  Patient treated in the ED with 5 mg p.o. vitamin K, 500 NS bolus, IV Dilaudid.     Date:    6/14/21: Patient seen and examined in bed no acute distress, signs stable, pulmonary consulted.  Awaiting input.  Dyspnea on 2 L of oxygen.    6/15/21:for Bronchoscopy today, INR 1.5, doing better today, anxious to go home.    Review of Systems   Constitutional: Negative.   HENT: Negative.    Eyes: Negative.    Cardiovascular: Positive for dyspnea on exertion.   Respiratory: Positive for shortness of breath.    Endocrine: Negative.    Hematologic/Lymphatic: Negative.    Skin: Negative.    Musculoskeletal: Negative.    Gastrointestinal: Negative.    Genitourinary: Negative.    Neurological: Negative.    Psychiatric/Behavioral: Negative.    Allergic/Immunologic: Negative.    All other systems reviewed and are negative.        Objective   Objective      Vital Signs  Temp:  [97.3 °F (36.3 °C)-98.3 °F (36.8 °C)] 97.7 °F (36.5 °C)  Heart Rate:  [59-96] 61  Resp:  [16-18] 18  BP: (103-163)/(59-68) 103/59  Oxygen Therapy  SpO2: 96 %  Pulse Oximetry Type: Intermittent  Device (Oxygen Therapy): room air  Flow (L/min): 2  Oxygen Concentration (%): 21  Flowsheet Rows      First Filed Value   Admission Height  165.1 cm (65\") Documented at 06/13/2021 1913   Admission Weight  81.6 kg (180 " lb) Documented at 06/13/2021 1913        Intake & Output (last 3 days)       06/12 0701 - 06/13 0700 06/13 0701 - 06/14 0700 06/14 0701 - 06/15 0700 06/15 0701 - 06/16 0700            Urine Unmeasured Occurrence   1 x         Lines, Drains & Airways    Active LDAs     Name:   Placement date:   Placement time:   Site:   Days:    Peripheral IV 06/13/21 1928 Right Antecubital   06/13/21 1928    Antecubital   less than 1    Single Lumen Implantable Port  Left Chest   -- unknown;ptHarshal rajan has been in a long time    -- unknown    Chest       VAD   --    --    LVAD                 Physical Exam  Vitals and nursing note reviewed.   Constitutional:       General: She is not in acute distress.     Appearance: Normal appearance. She is well-developed. She is not ill-appearing, toxic-appearing or diaphoretic.   HENT:      Head: Normocephalic and atraumatic.      Nose: Nose normal. No congestion or rhinorrhea.      Mouth/Throat:      Mouth: Mucous membranes are moist.      Pharynx: No oropharyngeal exudate.   Eyes:      General: No scleral icterus.        Right eye: No discharge.         Left eye: No discharge.      Extraocular Movements: Extraocular movements intact.      Conjunctiva/sclera: Conjunctivae normal.      Pupils: Pupils are equal, round, and reactive to light.   Neck:      Thyroid: No thyromegaly.      Vascular: No carotid bruit or JVD.      Trachea: No tracheal deviation.   Cardiovascular:      Rate and Rhythm: Normal rate and regular rhythm.      Pulses: Normal pulses.      Heart sounds: Normal heart sounds. No murmur heard.   No friction rub. No gallop.    Pulmonary:      Effort: Pulmonary effort is normal. No respiratory distress.      Breath sounds: No stridor. Rhonchi present. No wheezing or rales.   Chest:      Chest wall: No tenderness.   Abdominal:      General: Bowel sounds are normal. There is no distension.      Palpations: Abdomen is soft. There is no mass.      Tenderness: There is no abdominal  tenderness. There is no guarding or rebound.      Hernia: No hernia is present.   Musculoskeletal:         General: No swelling, tenderness, deformity or signs of injury. Normal range of motion.      Cervical back: Normal range of motion and neck supple. No rigidity. No muscular tenderness.      Right lower leg: No edema.      Left lower leg: No edema.   Lymphadenopathy:      Cervical: No cervical adenopathy.   Skin:     General: Skin is warm and dry.      Coloration: Skin is not jaundiced or pale.      Findings: No bruising, erythema or rash.   Neurological:      General: No focal deficit present.      Mental Status: She is alert and oriented to person, place, and time. Mental status is at baseline.      Cranial Nerves: No cranial nerve deficit.      Sensory: No sensory deficit.      Motor: No weakness or abnormal muscle tone.      Coordination: Coordination normal.   Psychiatric:         Mood and Affect: Mood normal.         Behavior: Behavior normal.         Thought Content: Thought content normal.         Judgment: Judgment normal.         Procedures:    Results Review:     I reviewed the patient's new clinical results.    Lab Results (last 24 hours)     Procedure Component Value Units Date/Time    COVID PRE-OP / PRE-PROCEDURE SCREENING ORDER (NO ISOLATION) - Swab, Nasopharynx [[id number]]  (Normal) Collected: 06/13/21 2332    Specimen: Swab from Nasopharynx Updated: 06/14/21 1508    Narrative:      The following orders were created for panel order COVID PRE-OP / PRE-PROCEDURE SCREENING ORDER (NO ISOLATION) - Swab, Nasopharynx.  Procedure                               Abnormality         Status                     ---------                               -----------         ------                     COVID-19,APTIMA PANTHER,...[[id number]]  Normal              Final result                 Please view results for these tests on the individual orders.    COVID-19,APTIMA PANTHERJAMAL IN-HOUSE, NP/OP SWAB IN  UTM/VTM/SALINE TRANSPORT MEDIA,24 HR TAT - Swab, Nasopharynx [[id number]]  (Normal) Collected: 06/13/21 2332    Specimen: Swab from Nasopharynx Updated: 06/14/21 1508     COVID19 Not Detected    Narrative:      Fact sheet for providers: https://www.fda.gov/media/956592/download     Fact sheet for patients: https://www.fda.gov/media/518917/download    Test performed by RT PCR.    Protime-INR [[id number]]  (Abnormal) Collected: 06/14/21 1410    Specimen: Blood Updated: 06/14/21 1455     Protime 16.3 Seconds      INR 1.51        No results found for: HGBA1C  Results from last 7 days   Lab Units 06/14/21  1410 06/14/21  0410 06/14/21  0007   INR  1.51* 4.30* >8.00*           Lab Results   Component Value Date    LIPASE 20 (L) 03/21/2017     Lab Results   Component Value Date    CHOL 194 09/22/2020    TRIG 74 09/22/2020    HDL 56 09/22/2020     (H) 09/22/2020       Lab Results   Lab Value Date/Time    FINALDX  02/10/2021 1254     Lung, right upper lobe, bronchoalveolar lavage, smears and cytospin preparation:    Positive for malignancy, non-small cell carcinoma, see comment    JPR/tkd       COMDX  02/10/2021 1254     Procurement of additional tissue is required for a more definitive diagnosis.           Microbiology Results (last 10 days)     Procedure Component Value - Date/Time    COVID PRE-OP / PRE-PROCEDURE SCREENING ORDER (NO ISOLATION) - Swab, Nasopharynx [[id number]]  (Normal) Collected: 06/13/21 2332    Lab Status: Final result Specimen: Swab from Nasopharynx Updated: 06/14/21 1508    Narrative:      The following orders were created for panel order COVID PRE-OP / PRE-PROCEDURE SCREENING ORDER (NO ISOLATION) - Swab, Nasopharynx.  Procedure                               Abnormality         Status                     ---------                               -----------         ------                     COVID-19,APTIMA PANTHER,...[[id number]]  Normal              Final result                 Please view results  for these tests on the individual orders.    COVID-19,APTIMA PANTHER,JAMAL IN-HOUSE, NP/OP SWAB IN UTM/VTM/SALINE TRANSPORT MEDIA,24 HR TAT - Swab, Nasopharynx [[id number]]  (Normal) Collected: 06/13/21 2332    Lab Status: Final result Specimen: Swab from Nasopharynx Updated: 06/14/21 1508     COVID19 Not Detected    Narrative:      Fact sheet for providers: https://www.fda.gov/media/813285/download     Fact sheet for patients: https://www.fda.gov/media/002682/download    Test performed by RT PCR.          ECG/EMG Results (most recent)     Procedure Component Value Units Date/Time    ECG 12 Lead [[id number]] Collected: 06/13/21 1913     Updated: 06/14/21 1401     QT Interval 435 ms     Narrative:      HEART RATE= 53  bpm  RR Interval= 1124  ms  AL Interval= 159  ms  P Horizontal Axis= -3  deg  P Front Axis= 47  deg  QRSD Interval= 89  ms  QT Interval= 435  ms  QRS Axis= 6  deg  T Wave Axis= 29  deg  - OTHERWISE NORMAL ECG -  Sinus bradycardia  When compared with ECG of 18-Mar-2021 13:17:10,  Significant repolarization change  Electronically Signed By: Filiberto Acosta ([facility]) 14-Jun-2021 14:00:51  Date and Time of Study: 2021-06-13 19:13:56    ECG 12 Lead [[id number]] Collected: 06/14/21 0851     Updated: 06/14/21 2104     QT Interval 468 ms     Narrative:      HEART RATE= 62  bpm  RR Interval= 968  ms  AL Interval= 170  ms  P Horizontal Axis= 5  deg  P Front Axis= 28  deg  QRSD Interval= 99  ms  QT Interval= 468  ms  QRS Axis= 3  deg  T Wave Axis= 10  deg  - NORMAL ECG -  Sinus rhythm  When compared with ECG of 14-Jun-2021 3:12:59,  Significant repolarization change  Electronically Signed By: Titi Carrillo ([facility]) 14-Jun-2021 21:00:59  Date and Time of Study: 2021-06-14 08:51:21    ECG 12 Lead [[id number]] Collected: 06/14/21 0312     Updated: 06/14/21 2104     QT Interval 450 ms     Narrative:      HEART RATE= 53  bpm  RR Interval= 1132  ms  AL Interval= 163  ms  P Horizontal Axis= -52  deg  P Front Axis= 67  deg  QRSD  Interval= 96  ms  QT Interval= 450  ms  QRS Axis= 15  deg  T Wave Axis= 35  deg  - ABNORMAL ECG -  Sinus bradycardia  Nonspecific T abnormalities, anterior leads  When compared with ECG of 13-Jun-2021 19:13:56,  Significant repolarization change  Electronically Signed By: Titi Carrillo ([facility]) 14-Jun-2021 21:01:24  Date and Time of Study: 2021-06-14 03:12:59    Adult Transthoracic Echo Complete W/ Cont if Necessary Per Protocol [[id number]] Collected: 06/14/21 0954     Updated: 06/14/21 2146     BSA 1.9 m^2      RVIDd 2.4 cm      IVSd 0.69 cm      IVSs 1.2 cm      LVIDd 5.6 cm      LVIDs 3.0 cm      LVPWd 0.8 cm      BH CV ECHO MARC - LVPWS 1.4 cm      IVS/LVPW 0.87     FS 46.0 %      EDV(Teich) 153.7 ml      ESV(Teich) 35.7 ml      EF(Teich) 76.7 %      EDV(cubed) 175.7 ml      ESV(cubed) 27.7 ml      EF(cubed) 84.2 %      % IVS thick 78.4 %      % LVPW thick 80.2 %      LV mass(C)d 151.1 grams      LV mass(C)dI 80.8 grams/m^2      LV mass(C)s 131.6 grams      LV mass(C)sI 70.4 grams/m^2      SV(Teich) 117.9 ml      SI(Teich) 63.1 ml/m^2      SV(cubed) 147.9 ml      SI(cubed) 79.2 ml/m^2      Ao root diam 2.8 cm      Ao root area 6.3 cm^2      ACS 1.9 cm      asc Aorta Diam 3.4 cm      LVOT diam 2.1 cm      LVOT area 3.5 cm^2      EDV(MOD-sp4) 60.2 ml      ESV(MOD-sp4) 27.2 ml      EF(MOD-sp4) 54.8 %      SV(MOD-sp4) 33.0 ml      SI(MOD-sp4) 17.6 ml/m^2      Ao root area (BSA corrected) 1.5     LV Mayberry Vol (BSA corrected) 32.2 ml/m^2      LV Sys Vol (BSA corrected) 14.6 ml/m^2      MV E max gaby 33.8 cm/sec      MV A max gaby 92.3 cm/sec      MV E/A 0.37     MV V2 max 91.6 cm/sec      MV max PG 3.4 mmHg      MV V2 mean 56.4 cm/sec      MV mean PG 1.4 mmHg      MV V2 VTI 26.2 cm      MVA(VTI) 3.3 cm^2      MV dec slope 265.3 cm/sec^2      MV dec time 0.25 sec      Ao pk gaby 160.3 cm/sec      Ao max PG 10.3 mmHg      Ao max PG (full) 5.6 mmHg      Ao V2 mean 113.7 cm/sec      Ao mean PG 5.6 mmHg      Ao mean PG (full) 2.8  mmHg      Ao V2 VTI 35.4 cm      DEVANTE(I,A) 2.4 cm^2      DEVANTE(I,D) 2.4 cm^2      DEVANTE(V,A) 2.4 cm^2      DEVANTE(V,D) 2.4 cm^2      LV V1 max PG 4.7 mmHg      LV V1 mean PG 2.9 mmHg      LV V1 max 108.4 cm/sec      LV V1 mean 82.1 cm/sec      LV V1 VTI 24.4 cm      SV(Ao) 223.1 ml      SI(Ao) 119.4 ml/m^2      SV(LVOT) 85.4 ml      SI(LVOT) 45.7 ml/m^2      PA V2 max 115.4 cm/sec      PA max PG 5.3 mmHg      PA max PG (full) 2.6 mmHg      PA V2 mean 79.8 cm/sec      PA mean PG 2.8 mmHg      PA mean PG (full) 1.3 mmHg      PA V2 VTI 27.3 cm      PA acc time 0.11 sec      RV V1 max PG 2.7 mmHg      RV V1 mean PG 1.5 mmHg      RV V1 max 82.4 cm/sec      RV V1 mean 58.7 cm/sec      RV V1 VTI 19.5 cm      PA pr(Accel) 30.7 mmHg      Pulm Sys Cosme 67.4 cm/sec      Pulm Mayberry Cosme 50.6 cm/sec      Pulm S/D 1.3     Pulm A Revs Dur 0.08 sec      Pulm A Revs Cosme 36.3 cm/sec       CV ECHO MARC - BZI_BMI 29.1 kilograms/m^2       CV ECHO MARC - BSA(HAYCOCK) 1.9 m^2       CV ECHO MARC - BZI_METRIC_WEIGHT 79.4 kg       CV ECHO MARC - BZI_METRIC_HEIGHT 165.1 cm      Target HR (85%) 132 bpm      Max. Pred. HR (100%) 155 bpm      EF(MOD-bp) 55.0 %      LA dimension(2D) 3.8 cm     Narrative:      · Left ventricular ejection fraction appears to be 56 - 60%.  · No pericardial effusion noted                 Results for orders placed during the hospital encounter of 06/13/21    Adult Transthoracic Echo Complete W/ Cont if Necessary Per Protocol    Interpretation Summary  · Left ventricular ejection fraction appears to be 56 - 60%.  · No pericardial effusion noted      XR Chest 1 View    Result Date: 6/13/2021    1.  New hazy right upper lobe airspace disease suspicious for pneumonia. 2.  No change in hazy bibasilar airspace disease likely due to chronic lung disease.   Electronically Signed By-Reid Nelson MD On:6/13/2021 8:11 PM This report was finalized on [id number] by  Reid Nelson MD.    CT Chest Pulmonary Embolism    Result  Date: 6/13/2021  1. Interval progression of extensive osseous metastatic disease. Findings are consistent with progression of metastatic neoplasm. 2. Worsening asymmetric interlobular septal thickening throughout the right lung with associated reticulonodular and miliary type nodular airspace disease. 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 This has worsened. Similar findings are identified in the left lower lobe as well. 3. Mediastinal and subcarinal adenopathy. This is felt to be metastatic and is essentially stable given differences in technique. 4. Bronchial wall thickening. There are subtle areas of mucous plugging involving multiple bronchioles to the right lower lobe. Correlate for bronchiolitis, reactive airways lung disease or possibly aspiration. 5. Small hiatal hernia. 6. No pulmonary embolus. Slot 63 Electronically signed by:  Reyes Agarwal M.D.  6/13/2021 6:50 PM      Xrays, labs reviewed personally by physician.    Medication Review:   I have reviewed the patient's current medication list    Scheduled Meds  acetylcysteine, 2 mL, Nebulization, BID - RT  atorvastatin, 20 mg, Oral, Daily  calcium 500 mg vitamin D 5 mcg (200 UT), 1 tablet, Oral, BID  cetirizine, 10 mg, Oral, Nightly  folic acid, 1,000 mcg, Oral, Daily  ipratropium-albuterol, 3 mL, Nebulization, Q4H - RT  magnesium oxide, 400 mg, Oral, BID  methylPREDNISolone sodium succinate, 40 mg, Intravenous, Q8H  montelukast, 10 mg, Oral, Nightly  pantoprazole, 40 mg, Oral, BID AC  potassium chloride, 40 mEq, Oral, Daily  pregabalin, 100 mg, Oral, TID  sertraline, 50 mg, Oral, Nightly  sodium chloride, 10 mL, Intravenous, Q12H  theophylline, 300 mg, Oral, Q24H  triamterene-hydrochlorothiazide, 1 tablet, Oral, Daily      Meds Infusions  Pharmacy to dose warfarin,   sodium chloride, 100 mL/hr, Last Rate: 100 mL/hr (06/14/21 0045)      Meds PRN  •  acetaminophen **OR** acetaminophen **OR** acetaminophen  •  albuterol  •   aluminum-magnesium hydroxide-simethicone  •  HYDROcodone-homatropine  •  magnesium sulfate **OR** magnesium sulfate in D5W 1g/100mL (PREMIX)  •  melatonin  •  nitroglycerin  •  ondansetron **OR** ondansetron  •  oxyCODONE  •  Pharmacy to dose warfarin  •  potassium chloride  •  sodium chloride  •  [COMPLETED] Insert peripheral IV **AND** sodium chloride  •  temazepam    Assessment/Plan   Assessment/Plan     Active Hospital Problems    Diagnosis  POA   • **Chest pain [R07.9]  Yes   • Elevated international normalized ratio (INR) due to prior anticoagulant medication ingestion [R79.1]  Yes   • Mucus plugging of bronchi [J98.09]  Yes   • History of DVT (deep vein thrombosis) [Z86.718]  Not Applicable   • Obesity (BMI 30-39.9) [E66.9]  Yes   • Depression [F32.9]  Yes   • Osteoporosis [M81.0]  Yes   • Brain metastases (CMS/HCC) [C79.31]  Yes   • Lung nodule [R91.1]  Yes   • Long term (current) use of anticoagulants [Z79.01]  Not Applicable   • Malignant neoplasm of right ovary (CMS/HCC) [C56.1]  Yes   • Anxiety [F41.9]  Yes   • Essential hypertension [I10]  Yes   • Insomnia [G47.00]  Yes   • Pain of metastatic malignancy [G89.3]  Yes   • Fibromyositis [M79.7]  Yes   • Hyperlipidemia [E78.5]  Yes      Resolved Hospital Problems   No resolved problems to display.       MEDICAL DECISION MAKING COMPLEXITY BY PROBLEM:      Chest pain:  - troponin negative  -CT for PE protocol shows interval progression of extensive osseous metastatic disease. Findings are consistent with progression of metastatic neoplasm. Worsening asymmetric interlobular septal thickening throughout the right lung with associated reticulonodular and miliary type nodular airspace disease. 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 This has worsened. Similar  findings are identified in the left lower lobe as well. Mediastinal and subcarinal adenopathy. This is felt to be metastatic and is essentially stable given differences in  technique. Bronchial wall thickening. There are subtle areas of mucous plugging involving multiple bronchioles to the right lower lobe. Correlate for bronchiolitis, reactive airways lung disease or possibly aspiration  -EKG shows sinus bradycardia rate 53 when compared with EKG of 03/18/2021 significant repolarization change  -In the ED patient was given 500 NS bolus, IV Dilaudid  -Echocardiogram ordered to evaluate LV function  -Trend EKGs every 6 hours x2  -Trend troponins every 6 hours x2  -Continuous cardiac monitoring  -Vital signs every 4     Elevated INR due to long-term anticoagulation:  -Upon arrival to the ED INR 7.54>4>  -Patient treated in ED with 5 mg p.o. vitamin K  -Continue to monitor INR, may need to administer IV vitamin K and/or FFP     Mucus plugging of bronchi:  -CT for PE protocol shows interval progression of extensive osseous metastatic disease. Findings are consistent with progression of metastatic neoplasm. Worsening asymmetric interlobular septal thickening throughout the right lung with associated reticulonodular and miliary type nodular airspace disease. 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 This has worsened. Similar  findings are identified in the left lower lobe as well. Mediastinal and subcarinal adenopathy. This is felt to be metastatic and is essentially stable given differences in technique. Bronchial wall thickening. There are subtle areas of mucous plugging involving multiple bronchioles to the right lower lobe. Correlate for bronchiolitis, reactive airways lung disease or possibly aspiration. Small hiatal hernia. No pulmonary embolus  -Consult pulmonology for potential bronchoscopy, Dr. 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 STEPHEN Zaman  -Consult palliative care for progressive disease. She still wants to consider  palliative chemotherapy.  Lately she has progressed through multiple lines of treatment.     Pain of metastatic malignancy:  -Continue home medication Roxicodone 10 mg p.o. every 4 as needed for moderate pain  -Continue home medication Hycodan 5-1.5 mg 5 mL p.o. twice daily as needed for cough  -Inspect verified     Essential hypertension  -Continue home medication Dyazide 37.5-25 mg p.o. daily     Mixed hyperlipidemia:  -Continue home medication atorvastatin 20 mg p.o. nightly     History of DVT/long-term use of anticoagulants:  -Pharmacy to dose warfarin during admission for lung metastasis     Anxiety/depression:  -Continue home medication Zoloft 50 mg p.o. nightly     Fibromatosis:  -Continue home medication Lyrica 100 mg p.o. 3 times daily  -Inspect verified     Obesity:  -Encourage dietary changes     Osteoporosis:  -Continue home medication calcium 500 mg vitamin D 5 mg p.o. twice daily     Insomnia:  -Continue home medication Restoril 30 mg p.o. nightly as needed for insomnia        VTE Prophylaxis -   Mechanical Order History:      Ordered        06/13/21 2312  Place Sequential Compression Device  Once         06/13/21 2312  Maintain Sequential Compression Device  Continuous                 Pharmalogical Order History:      Ordered     Dose Route Frequency Stop    06/13/21 2305  Pharmacy to dose warfarin     Question:  Target INR  Answer:  2 - 3    -- XX Continuous PRN --                  Code Status -   Code Status and Medical Interventions:   Ordered at: 06/13/21 2312     Level Of Support Discussed With:    Patient     Code Status:    CPR     Medical Interventions (Level of Support Prior to Arrest):    Full       This patient has been examined wearing appropriate Personal Protective Equipment and discussed with [facility] department and rn. 06/15/21        Discharge Planning  home        Electronically signed by Gautam Hartley MD, 06/15/21, 08:47 EDT.  [facility] Hospitalist Team

## 2021-06-15 NOTE — DISCHARGE SUMMARY
[facility] Medicine Services  DISCHARGE SUMMARY        Prepared For PCP:  Noemy Queen MD    Patient Name: Tahira Zimmerman  : 1955  MRN: [id number]      Date of Admission:   2021    Date of Discharge:  6/15/2021    Length of stay:  LOS: 0 days     Hospital Course     Presenting Problem:   Shortness of breath [R06.02]  Poisoning by warfarin sodium, accidental or unintentional, initial encounter [T45.511A]  Chest pain, unspecified type [R07.9]  Asthma, unspecified asthma severity, unspecified whether complicated, unspecified whether persistent [J45.909]      Active Hospital Problems    Diagnosis  POA   • **Malignant neoplasm of right ovary (CMS/HCC) [C56.1]  Yes   • Elevated international normalized ratio (INR) due to prior anticoagulant medication ingestion [R79.1]  Yes   • Mucus plugging of bronchi [J98.09]  Yes   • Chest pain [R07.9]  Yes   • History of DVT (deep vein thrombosis) [Z86.718]  Not Applicable   • Obesity (BMI 30-39.9) [E66.9]  Yes   • Depression [F32.9]  Yes   • Osteoporosis [M81.0]  Yes   • Brain metastases (CMS/HCC) [C79.31]  Yes   • Lung nodule [R91.1]  Yes   • Long term (current) use of anticoagulants [Z79.01]  Not Applicable   • Anxiety [F41.9]  Yes   • Essential hypertension [I10]  Yes   • Insomnia [G47.00]  Yes   • Pain of metastatic malignancy [G89.3]  Yes   • Fibromyositis [M79.7]  Yes   • Hyperlipidemia [E78.5]  Yes      Resolved Hospital Problems   No resolved problems to display.     Chest pain:  - troponin negative  -CT for PE protocol shows interval progression of extensive osseous metastatic disease. Findings are consistent with progression of metastatic neoplasm. Worsening asymmetric interlobular septal thickening throughout the right lung with associated reticulonodular and miliary type nodular airspace disease. 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 This has worsened. Similar  findings are identified in the  left lower lobe as well. Mediastinal and subcarinal adenopathy. This is felt to be metastatic and is essentially stable given differences in technique. Bronchial wall thickening. There are subtle areas of mucous plugging involving multiple bronchioles to the right lower lobe. Correlate for bronchiolitis, reactive airways lung disease or possibly aspiration  -EKG shows sinus bradycardia rate 53 when compared with EKG of 03/18/2021 significant repolarization change  -In the ED patient was given 500 NS bolus, IV Dilaudid  -Echocardiogram ordered to evaluate LV function     Elevated INR due to long-term anticoagulation:  -Upon arrival to the ED INR 7.54>4>  -Patient treated in ED with 5 mg p.o. vitamin K  -Continue to monitor INR, may need to administer IV vitamin K and/or FFP     Mucus plugging of bronchi:  -CT for PE protocol shows interval progression of extensive osseous metastatic disease. Findings are consistent with progression of metastatic neoplasm. Worsening asymmetric interlobular septal thickening throughout the right lung with associated reticulonodular and miliary type nodular airspace disease. 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 This has worsened. Similar  findings are identified in the left lower lobe as well. Mediastinal and subcarinal adenopathy. This is felt to be metastatic and is essentially stable given differences in technique. Bronchial wall thickening. There are subtle areas of mucous plugging involving multiple bronchioles to the right lower lobe. Correlate for bronchiolitis, reactive airways lung disease or possibly aspiration. Small hiatal hernia. No pulmonary embolus  -Consult pulmonology for potential bronchoscopy, Dr. 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 STEPHEN Zaman  -Consult palliative care for progressive disease. She  still wants to consider palliative chemotherapy.  Lately she has progressed through multiple lines of treatment.     Pain of metastatic malignancy:  -Continue home medication Roxicodone 10 mg p.o. every 4 as needed for moderate pain  -Continue home medication Hycodan 5-1.5 mg 5 mL p.o. twice daily as needed for cough  -Inspect verified     Essential hypertension  -Continue home medication Dyazide 37.5-25 mg p.o. daily     Mixed hyperlipidemia:  -Continue home medication atorvastatin 20 mg p.o. nightly     History of DVT/long-term use of anticoagulants:  -Pharmacy to dose warfarin during admission for lung metastasis     Anxiety/depression:  -Continue home medication Zoloft 50 mg p.o. nightly     Fibromatosis:  -Continue home medication Lyrica 100 mg p.o. 3 times daily  -Inspect verified     Obesity:  -Encourage dietary changes     Osteoporosis:  -Continue home medication calcium 500 mg vitamin D 5 mg p.o. twice daily     Insomnia:  -Continue home medication Restoril 30 mg p.o. nightly as needed for insomnia       Hospital Course:  Tahira Zimmerman is a 65 y.o. female w/PMH of ovarian CA with metastasis to bone and brain currently on oral chemotherapy, HLD, HTN, DVT, obesity, depression, MELANI, RLS, fibromatosis who presents to [facility] ED due to chest pain. Patient states 10/10 sharp midsternal chest pain with radiation to right chest wall began this evening while doing laundry, no aggravating or alleviating factors noted.  Patient admits to cough, dyspnea, congestion, hoarseness, weakness, joint pain.  Patient denies fever, chills, nausea, vomiting, palpitations.  As dyspnea did not improve she decided to go to [facility] ED     On arrival to the ED vitals temp 98.3, HR 54, RR 18, /81, O2 sats 100% on room air.  Troponin less than 0.010, glucose 96, , K4.4, CO2 24, creatinine 1.11, BUN 20, alkaline phosphatase 121, ALT 6, AST 15, total bili 0.3, INR 7.54, PTT 75.3, PTT 54.8, WBC  4.0, Hgb 9.9, platelets 172, neutrophils 74.9.  EKG shows sinus bradycardia rate 53 when compared with EKG of 03/18/2021 significant repolarization change.  Chest x-ray shows left subclavian Port-A-Cath in place, heart size at the upper limits of normal, ill-defined right upper lobe airspace disease suspicious for pneumonia, hazy bibasilar airspace disease similar to prior study likely due to chronic lung disease.  CT for PE protocol shows interval progression of extensive osseous metastatic disease. Findings are consistent with progression of metastatic neoplasm. Worsening asymmetric interlobular septal thickening throughout the right lung with associated reticulonodular and miliary type nodular airspace disease. 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 This has worsened. Similar  findings are identified in the left lower lobe as well. Mediastinal and subcarinal adenopathy. This is felt to be metastatic and is essentially stable given differences in technique. Bronchial wall thickening. There are subtle areas of mucous plugging involving multiple bronchioles to the right lower lobe. Correlate for bronchiolitis, reactive airways lung disease or possibly aspiration. Small hiatal hernia. No pulmonary embolus.  Patient treated in the ED with 5 mg p.o. vitamin K, 500 NS bolus, IV Dilaudid.     Date:     6/14/21: Patient seen and examined in bed no acute distress, signs stable, pulmonary consulted.  Awaiting input.  Dyspnea on 2 L of oxygen.     6/15/21:s/p Bronchoscopy today, INR 1.5 restart coumadin. Will dc home      Recommendation for Outpatient Providers:   monitor INR    Reasons For Change In Medications and Indications for New Medications:        Day of Discharge   Vital Signs:   Temp:  [97.7 °F (36.5 °C)-98.3 °F (36.8 °C)] 98 °F (36.7 °C)  Heart Rate:  [] 93  Resp:  [16-22] 16  BP: (103-142)/(56-78) 130/68     Physical Exam  Vitals and nursing note reviewed.   Constitutional:        General: She is not in acute distress.     Appearance: She is well-developed. She is not ill-appearing, toxic-appearing or diaphoretic.   HENT:      Head: Normocephalic and atraumatic.      Nose: Nose normal. No congestion or rhinorrhea.      Mouth/Throat:      Mouth: Mucous membranes are moist.      Pharynx: No oropharyngeal exudate.   Eyes:      General: No scleral icterus.        Right eye: No discharge.         Left eye: No discharge.      Extraocular Movements: Extraocular movements intact.      Conjunctiva/sclera: Conjunctivae normal.      Pupils: Pupils are equal, round, and reactive to light.   Neck:      Thyroid: No thyromegaly.      Vascular: No carotid bruit or JVD.      Trachea: No tracheal deviation.   Cardiovascular:      Rate and Rhythm: Normal rate and regular rhythm.      Pulses: Normal pulses.      Heart sounds: Normal heart sounds. No murmur heard.   No friction rub. No gallop.    Pulmonary:      Effort: Pulmonary effort is normal. No respiratory distress.      Breath sounds: No stridor. Rhonchi present. No wheezing or rales.   Chest:      Chest wall: No tenderness.   Abdominal:      General: Bowel sounds are normal. There is no distension.      Palpations: Abdomen is soft. There is no mass.      Tenderness: There is no abdominal tenderness. There is no guarding or rebound.      Hernia: No hernia is present.   Musculoskeletal:         General: No swelling, tenderness, deformity or signs of injury. Normal range of motion.      Cervical back: Normal range of motion and neck supple. No rigidity. No muscular tenderness.      Right lower leg: No edema.      Left lower leg: No edema.   Lymphadenopathy:      Cervical: No cervical adenopathy.   Skin:     General: Skin is warm and dry.      Coloration: Skin is not jaundiced or pale.      Findings: No bruising, erythema or rash.   Neurological:      General: No focal deficit present.      Mental Status: She is alert and oriented to person, place, and time.  Mental status is at baseline.      Cranial Nerves: No cranial nerve deficit.      Sensory: No sensory deficit.      Motor: No weakness or abnormal muscle tone.      Coordination: Coordination normal.   Psychiatric:         Mood and Affect: Mood normal.         Behavior: Behavior normal.         Thought Content: Thought content normal.         Pertinent  and/or Most Recent Results     Results from last 7 days   Lab Units 06/15/21  1008 06/14/21  0410 06/13/21 1930   WBC 10*3/mm3 4.80 3.60 4.00   HEMOGLOBIN g/dL 8.7* 9.7* 9.9*   HEMATOCRIT % 26.6* 29.3* 29.4*   PLATELETS 10*3/mm3 134* 136* 172   SODIUM mmol/L 139 136 135*   POTASSIUM mmol/L 4.0 3.8 4.4   CHLORIDE mmol/L 104 100 98   CO2 mmol/L 23.0 25.0 24.0   BUN mg/dL 21 20 20   CREATININE mg/dL 0.90 1.06* 1.11*   GLUCOSE mg/dL 141* 132* 96   CALCIUM mg/dL 8.4* 8.6 8.9     Results from last 7 days   Lab Units 06/15/21  0924 06/14/21  1410 06/14/21  0410 06/14/21  0007 06/13/21 1930 06/11/21  0754   BILIRUBIN mg/dL  --   --   --   --  0.3  --    ALK PHOS U/L  --   --   --   --  121*  --    ALT (SGPT) U/L  --   --   --   --  6  --    AST (SGOT) U/L  --   --   --   --  15  --    PROTIME Seconds 12.4* 16.3* 44.1* >90.0* 75.3*  --    INR  1.13* 1.51* 4.30* >8.00* 7.54* 3.30*   APTT seconds  --   --   --   --  54.8*  --            Invalid input(s): TG, LDLCALC, LDLREALC  Results from last 7 days   Lab Units 06/14/21  0557 06/14/21  0007 06/13/21 1930   TROPONIN T ng/mL <0.010 <0.010 <0.010       Brief Urine Lab Results     None          Microbiology Results Abnormal     Procedure Component Value - Date/Time    Respiratory Panel, PCR (WITHOUT COVID) - Lavage, Lung, Right Middle Lobe [[id number]]  (Normal) Collected: 06/15/21 1233    Lab Status: Final result Specimen: Lavage from Lung, Right Middle Lobe Updated: 06/15/21 1356     ADENOVIRUS, PCR Not Detected     Coronavirus 229E Not Detected     Coronavirus HKU1 Not Detected     Coronavirus NL63 Not Detected      Coronavirus OC43 Not Detected     Human Metapneumovirus Not Detected     Human Rhinovirus/Enterovirus Not Detected     Influenza B PCR Not Detected     Parainfluenza Virus 1 Not Detected     Parainfluenza Virus 2 Not Detected     Parainfluenza Virus 3 Not Detected     Parainfluenza Virus 4 Not Detected     Bordetella pertussis pcr Not Detected     Chlamydophila pneumoniae PCR Not Detected     Mycoplasma pneumo by PCR Not Detected     Influenza A PCR Not Detected     RSV, PCR Not Detected     Bordetella parapertussis PCR Not Detected    Narrative:      The coronavirus on the RVP is NOT COVID-19 and is NOT indicative of infection with COVID-19.    In the setting of a positive respiratory panel with a viral infection PLUS a negative procalcitonin without other underlying concern for bacterial infection, consider observing off antibiotics or discontinuation of antibiotics and continue supportive care. If the respiratory panel is positive for atypical bacterial infection (Bordetella pertussis, Chlamydophila pneumoniae, or Mycoplasma pneumoniae), consider antibiotic de-escalation to target atypical bacterial infection.    COVID PRE-OP / PRE-PROCEDURE SCREENING ORDER (NO ISOLATION) - Swab, Nasopharynx [[id number]]  (Normal) Collected: 06/13/21 2332    Lab Status: Final result Specimen: Swab from Nasopharynx Updated: 06/14/21 9361    Narrative:      The following orders were created for panel order COVID PRE-OP / PRE-PROCEDURE SCREENING ORDER (NO ISOLATION) - Swab, Nasopharynx.  Procedure                               Abnormality         Status                     ---------                               -----------         ------                     COVID-19,APTIMA PANTHER,...[[id number]]  Normal              Final result                 Please view results for these tests on the individual orders.    COVID-19,APTIMA PANTHERJAMAL IN-HOUSE, NP/OP SWAB IN UTM/VTM/SALINE TRANSPORT MEDIA,24 HR TAT - Swab, Nasopharynx [[id number]]   (Normal) Collected: 06/13/21 8042    Lab Status: Final result Specimen: Swab from Nasopharynx Updated: 06/14/21 1508     COVID19 Not Detected    Narrative:      Fact sheet for providers: https://www.fda.gov/media/885538/download     Fact sheet for patients: https://www.fda.gov/media/218750/download    Test performed by RT PCR.          XR Chest 1 View    Result Date: 6/13/2021  Impression:   1.  New hazy right upper lobe airspace disease suspicious for pneumonia. 2.  No change in hazy bibasilar airspace disease likely due to chronic lung disease.   Electronically Signed By-Reid Nelson MD On:6/13/2021 8:11 PM This report was finalized on [id number] by  Reid Nelson MD.    CT Chest Pulmonary Embolism    Result Date: 6/13/2021  Impression: 1. Interval progression of extensive osseous metastatic disease. Findings are consistent with progression of metastatic neoplasm. 2. Worsening asymmetric interlobular septal thickening throughout the right lung with associated reticulonodular and miliary type nodular airspace disease. 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 This has worsened. Similar findings are identified in the left lower lobe as well. 3. Mediastinal and subcarinal adenopathy. This is felt to be metastatic and is essentially stable given differences in technique. 4. Bronchial wall thickening. There are subtle areas of mucous plugging involving multiple bronchioles to the right lower lobe. Correlate for bronchiolitis, reactive airways lung disease or possibly aspiration. 5. Small hiatal hernia. 6. No pulmonary embolus. Slot 63 Electronically signed by:  Reyes Agarwal M.D.  6/13/2021 6:50 PM              Results for orders placed during the hospital encounter of 06/13/21    Adult Transthoracic Echo Complete W/ Cont if Necessary Per Protocol    Interpretation Summary  · Left ventricular ejection fraction appears to be 56 - 60%.  · No pericardial effusion noted              Test  Results Pending at Discharge  Pending Labs     Order Current Status    Non-gynecologic Cytology Collected (06/15/21 1233)    AFB Culture - Lavage, Lung, Right Middle Lobe In process    BAL Culture, Quantitative - Lavage, Lung, Right Middle Lobe In process    Cytomegalovirus (CMV) By PCR - Lavage, Lung, Right Middle Lobe In process    Fungus Culture - Lavage, Lung, Right Middle Lobe In process    Pneumocystis PCR - Lavage, Lung, Right Middle Lobe In process    Virus Culture - Lavage, Lung, Right Middle Lobe In process            Procedures Performed  Procedure(s):  BRONCHOSCOPY with bronchoalveolar lavage    Findings:  Bronchoscope passed into oral cavity to the level of the vocal cords.  Lidocaine used for local anesthetic over vocal cords.  Bronchoscope was passed between the vocal cords into the trachea.  All airways were visualized to at least the first subsegment level of all 5 lobes of both lungs.        mucopurulent secretions were suctioned therapeutically  Diffuse endobronchial nodular densities  Suspicious for metastatic malignancy versus infectious etiology      Consults:   Consults     Date and Time Order Name Status Description    6/14/2021  1:26 AM Hematology & Oncology Inpatient Consult      6/13/2021 11:12 PM Inpatient Pulmonology Consult Completed     6/13/2021 10:12 PM Hospitalist (on-call MD unless specified)          Assessment:     INR down to 1.51     Dyspnea on exertion  Persistent cough  Chronic bronchitis,      CT chest 5/5/2021  Significant interval worsening of interstitial, reticular nodular,  and groundglass densities in the right lung, particularly in the right  upper lobe, since 3/1/2021. FINDINGS are worrisome for progressive  lymphangitic spread of tumor     Bronchoscopy 02/10/2021  Mild inflammatory changes were noted  Diagnostic BAL right lower lobe was done  COVID-19 test negative on 2/9/2021  History of ovarian cancer with metastasis     COVID 19 negative       Plan     Bronchoscopy today will be scheduled for persistent cough and micronodular lung disease, tree-in-bud, groundglass alveolitis discussed with patient        Currently on mucomyst nebs/duo-nebs  Solumederol 40 mg IV    Discharge Details        Discharge Medications      Continue These Medications      Instructions Start Date   acetaminophen 500 MG tablet  Commonly known as: TYLENOL   1,000 mg, Oral, Every 6 Hours PRN      albuterol sulfate  (90 Base) MCG/ACT inhaler  Commonly known as: PROVENTIL HFA;VENTOLIN HFA;PROAIR HFA   2 puffs, Inhalation, Every 4 Hours PRN      atorvastatin 20 MG tablet  Commonly known as: LIPITOR   20 mg, Oral, Every Night at Bedtime      calcium carbonate 600 MG tablet  Commonly known as: Calcium 600   600 mg, Oral, 2 times daily      cetirizine 10 MG tablet  Commonly known as: zyrTEC   10 mg, Oral, Nightly      cyanocobalamin 1000 MCG/ML injection   1,000 mcg, Intramuscular, Every 28 Days      dexamethasone 4 MG tablet  Commonly known as: DECADRON   Take 2 tablets in the morning daily on days 2, 3 & 4.  Take with food.      etoposide 50 MG chemo capsule  Commonly known as: TOPOSAR;VEPESID   Take 1 capsule (50mg) by mouth every other day, alternate with 2 caps (100mg) every other day for 21 days, off 7 days.  Swallow whole. Refrigerate.      famotidine 40 MG tablet  Commonly known as: PEPCID   TAKE 1 TABLET BY MOUTH EVERY MORNING BEFORE BREAKFAST      folic acid 1 MG tablet  Commonly known as: FOLVITE   1,000 mcg, Oral, Daily      HYDROcodone-homatropine 5-1.5 MG/5ML syrup  Commonly known as: HYCODAN   5 mL, Oral, 2 Times Daily PRN      MAGnesium-Oxide 400 (241.3 Mg) MG tablet tablet  Generic drug: magnesium oxide   400 mg, Oral, 2 Times Daily      montelukast 10 MG tablet  Commonly known as: SINGULAIR   10 mg, Oral, Nightly      ondansetron 8 MG tablet  Commonly known as: ZOFRAN   8 mg, Oral, 3 Times Daily PRN      oxyCODONE 10 MG tablet  Commonly known as: ROXICODONE    10 mg, Oral, Every 4 Hours PRN      potassium chloride 20 MEQ CR tablet  Commonly known as: K-DUR,KLOR-CON   40 mEq, Oral, Daily      pregabalin 100 MG capsule  Commonly known as: LYRICA   100 mg, Oral, 3 Times Daily      sertraline 50 MG tablet  Commonly known as: ZOLOFT   50 mg, Oral, Nightly      temazepam 30 MG capsule  Commonly known as: RESTORIL   30 mg, Oral, Nightly PRN      triamterene-hydrochlorothiazide 37.5-25 MG per capsule  Commonly known as: DYAZIDE   TAKE 1 CAPSULE BY MOUTH EVERY DAY      warfarin 1 MG tablet  Commonly known as: COUMADIN   1 mg, Oral, Daily Warfarin, At 1700 as directed             Allergies   Allergen Reactions   • Morphine Nausea Only and Hives   • Penicillin V Potassium Rash   • Sulfa Antibiotics Rash   • Levaquin [Levofloxacin] Nausea Only and Dizziness     When taken with Coumadin   • Amoxicillin Rash         Discharge Disposition:  Home or Self Care    Diet:  Hospital:  Diet Order   Procedures   • Diet Regular         Discharge Activity:   Activity Instructions     Gradually Increase Activity Until at Pre-Hospitalization Level              CODE STATUS:    Code Status and Medical Interventions:   Ordered at: 06/13/21 7721     Level Of Support Discussed With:    Patient     Code Status:    CPR     Medical Interventions (Level of Support Prior to Arrest):    Full         Follow-up Appointments  Future Appointments   Date Time Provider Department Center   6/21/2021  8:30 AM Partha Delong MD [facility] None   7/2/2021  2:40 PM Fito Ram MD [facility] PAIN  [facility]   7/6/2021  8:15 AM LAB  [facility] LAB NA [facility]   7/6/2021  8:30 AM RN REVIEW ROOM  [facility] CC NA [facility]   9/7/2021  2:40 PM Jerica Esparza MD [facility] None             Condition on Discharge:      Stable      This patient has been examined wearing appropriate Personal Protective Equipment and discussed with rn. 06/15/21      Electronically signed by Gautam Hartley MD, 06/15/21, 2:06 PM EDT.      Time: I  spent  34  minutes on this discharge activity which included face-to-face encounter with the patient/reviewing the data in the system/coordination of the care with the nursing staff as well as consultants/documentation/entering orders.

## 2021-06-15 NOTE — PLAN OF CARE
Goal Outcome Evaluation:  Plan of Care Reviewed With: patient        Progress: no change  Outcome Summary: Patient has slept on and off tonight, pt npo for bronch in am. Pt has had no new complaints tomight. pt port accessed. pt reminded to turn.

## 2021-06-15 NOTE — PLAN OF CARE
Goal Outcome Evaluation:  Plan of Care Reviewed With: patient        Progress: improving  Outcome Summary: Discharge

## 2021-06-15 NOTE — PROGRESS NOTES
Palliative Care Social Work Progress Note    Code Status:full code    Goals of Care: Full Treatment    Narrative:Palliative care  followed up today about advance directives with pt.  Yesterday pt was provided with ACP packet, and POST form.  Pt reports that she wants to discuss with her daughter first before completing, and notes that her daughter is in poor health and won't be able to come see her in the hospital.  Pt plans to complete once she returns home and is able to confer with her daughter.  Emotional support provided.     Plan:  Goals of care clarified  ACP provided  Will sign off, please reconsult if we can be of any further assistance, thank you          JUANY Patel

## 2021-06-15 NOTE — DISCHARGE PLACEMENT REQUEST
"Tahira Roblero (65 y.o. Female)     Date of Birth Social Security Number Address Home Phone MRN    1955  [id number] [facility], [place], [facility] [address] [phone] [id number]    [employer] Mar[employer] Status          [employer]        Admission Date Admission Type Admitting Provider Attending Provider Department, Room/Bed    6/13/21 Emergency Gautam Hartley MD Salcedo, Federico N, MD [facility] 2B MEDICAL INPATIENT, 220/1    Discharge Date Discharge Disposition Discharge Destination         Home or Self Care              Attending Provider: Gautam Hartley MD    Allergies: Morphine, Penicillin V Potassium, Sulfa Antibiotics, Levaquin [Levofloxacin], Amoxicillin    Isolation: None   Infection: None   Code Status: CPR    Ht: 165.1 cm (65\")   Wt: 80.2 kg (176 lb 14.4 oz)    Admission Cmt: None   Principal Problem: Malignant neoplasm of right ovary (CMS/HCC) [C56.1] More...                 Active Insurance as of 6/13/2021     Primary Coverage     Payor Plan Insurance Group Employer/Plan Group    MEDICARE MEDICARE A & B      Payor Plan Address Payor Plan Phone Number Payor Plan Fax Number Effective Dates    [address] [phone]  7/1/2020 - None Entered    [facility] [address]       Subscriber Name Subscriber Birth Date Member ID       TAHIRA ROBLERO 1955 [id number]           Secondary Coverage     Payor Plan Insurance Group Employer/Plan Group    B[employer]      Payor Plan Address Payor Plan Phone Number Payor Plan Fax Number Effective Dates    [address] [phone]  7/1/2020 - None Entered    [place] [address]       Subscriber Name Subscriber Birth Date Member ID       TAHIRA ROBLERO 1955 [id number]           Tertiary Coverage     Payor Plan Insurance Group Employer/Plan Group    [place]      Payor Plan Address Payor Plan Phone Number Payor Plan Fax Number Effective Dates    [address]   2020 - None Entered    [place] [address]       Subscriber Name Subscriber Birth Date Member ID       TAHIRA ROBLERO 1955 [id number]                 Emergency Contacts      (Rel.) Home Phone Work Phone Mobile Phone    DANIELLE MOLINA (Daughter) [phone] -- [phone]               History & Physical      Lillian Starr APRN at 21 2222     Attestation signed by Gautam Hartley MD at 21 0855    I have reviewed the notes, assessments, and/or procedures performed by *Lillian Starr APRN, I concur with her/his documentation of Tahira Roblero.                         [facility]      Patient Name: Tahira Roblero  : 1955  MRN: [id number]  Primary Care Physician: Noemy Queen MD  Date of admission: 2021    Patient Care Team:  Noemy Queen MD as PCP - General (Family Medicine)  Cindy Ball MD as Consulting Physician (Hematology and Oncology)          Subjective   History Present Illness     Chief Complaint:   Chief Complaint   Patient presents with   • Chest Pain       Chief complaint: Chest pain         History of present illness:    Ms. Roblero is a 65 y.o. w/PMH of ovarian CA with metastasis to bone and brain currently on oral chemotherapy, HLD, HTN, DVT, obesity, depression, MELANI, RLS, fibromatosis who presents to [facility] ED due to chest pain. Patient states 10/10 sharp midsternal chest pain with radiation to right chest wall began this evening while doing laundry, no aggravating or alleviating factors noted.  Patient admits to cough, dyspnea, congestion, hoarseness, weakness, joint pain.  Patient denies fever, chills, nausea, vomiting, palpitations.  As dyspnea did not improve she decided to go to [facility] ED        On arrival to the ED vitals temp 98.3, HR 54, RR 18, /81, O2 sats 100% on room air.  Troponin less than 0.010, glucose 96, ,  K4.4, CO2 24, creatinine 1.11, BUN 20, alkaline phosphatase 121, ALT 6, AST 15, total bili 0.3, INR 7.54, PTT 75.3, PTT 54.8, WBC 4.0, Hgb 9.9, platelets 172, neutrophils 74.9.  EKG shows sinus bradycardia rate 53 when compared with EKG of 03/18/2021 significant repolarization change.  Chest x-ray shows left subclavian Port-A-Cath in place, heart size at the upper limits of normal, ill-defined right upper lobe airspace disease suspicious for pneumonia, hazy bibasilar airspace disease similar to prior study likely due to chronic lung disease.  CT for PE protocol shows interval progression of extensive osseous metastatic disease. Findings are consistent with progression of metastatic neoplasm. Worsening asymmetric interlobular septal thickening throughout the right lung with associated reticulonodular and miliary type nodular airspace disease. 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 This has worsened. Similar  findings are identified in the left lower lobe as well. Mediastinal and subcarinal adenopathy. This is felt to be metastatic and is essentially stable given differences in technique. Bronchial wall thickening. There are subtle areas of mucous plugging involving multiple bronchioles to the right lower lobe. Correlate for bronchiolitis, reactive airways lung disease or possibly aspiration. Small hiatal hernia. No pulmonary embolus.  Patient treated in the ED with 5 mg p.o. vitamin K, 500 NS bolus, IV Dilaudid.    History of Present Illness    Review of Systems   Constitutional: Negative for chills and fever.   HENT: Positive for congestion and hoarse voice.    Eyes: Negative.  Negative for visual disturbance.   Cardiovascular: Positive for chest pain, dyspnea on exertion and irregular heartbeat.   Respiratory: Positive for cough, shortness of breath and sleep disturbances due to breathing.    Endocrine: Negative.    Hematologic/Lymphatic: Positive for bleeding problem. Bruises/bleeds  easily.   Skin: Positive for dry skin.   Musculoskeletal: Positive for back pain, joint pain, joint swelling, muscle weakness and myalgias.   Gastrointestinal: Negative.    Genitourinary: Negative.    Neurological: Positive for weakness.   Psychiatric/Behavioral: Positive for depression. The patient is nervous/anxious.    Allergic/Immunologic: Negative.    All other systems reviewed and are negative.          Personal History     Past Medical History:   Past Medical History:   Diagnosis Date   • Anemia 2013   • Cervical disc disorder 2013    Herniated disc, pinched nerve   • Clotting disorder (CMS/[facility]ly [facility]) 2013    Low platelets from chemo   • Colon polyp 2013   • Deep vein thrombosis (CMS/[facility]ly [facility]) 2013   • Diverticulosis 2013   • GERD (gastroesophageal reflux disease) 2016   • HL (hearing loss) 2016    I need hearing aids   • Hyperlipidemia 2013    Need my cholesterol rechecked   • Hypertension    • Joint pain 2013    Shoulder pain, torn rotator cuff   • Low back pain 2013   • Neuromuscular disorder (CMS/[facility]ly [facility]) 2015    Shingles, pinched nerves in my neck   • Osteopenia 2014   • Osteoporosis    • Ovarian cancer (CMS/[facility]ly [facility])     ovarian--  spread to lungs 10/2020   • Ovarian cancer on left (CMS/[facility]ly [facility]) 1995   • Pneumonia 2010    Have had it several times   • Spinal stenosis 2013    Cervical   • Urinary tract infection 2013    Have had several utis       Surgical History:      Past Surgical History:   Procedure Laterality Date   • BRONCHOSCOPY N/A 2/10/2021    Procedure: BRONCHOSCOPY with bronchioalveolar lavage;  Surgeon: Jerica Esparza MD;  Location: [facility] ENDOSCOPY;  Service: Pulmonary;  Laterality: N/A;  post op:right lobe pneumonia   • CATARACT EXTRACTION     • COLON SURGERY     • COLONOSCOPY  05/26/2018   • EXPLORATORY LAPAROTOMY     • HERNIA REPAIR     • HYSTERECTOMY     • OOPHORECTOMY         Family History: family history includes Cancer in her father; Hypertension in her brother, father, mother, and sister; Stroke in her  brother.    Social History:  reports that she has never smoked. She has never used smokeless tobacco. She reports that she does not drink alcohol and does not use drugs.      Medications:  Prior to Admission medications    Medication Sig Start Date End Date Taking? Authorizing Provider   acetaminophen (TYLENOL) 500 MG tablet Take 1,000 mg by mouth Every 6 (Six) Hours As Needed for Mild Pain .    Ella Andres MD   albuterol sulfate  (90 Base) MCG/ACT inhaler Inhale 2 puffs Every 4 (Four) Hours As Needed for Wheezing. 1/13/21   Noemy Queen MD   atorvastatin (LIPITOR) 20 MG tablet Take 20 mg by mouth every night at bedtime. 5/11/19   Ella Andres MD   calcium carbonate (Calcium 600) 600 MG tablet Take 1 tablet by mouth 2 (two) times a day. 2/1/21   Cindy Ball MD   cetirizine (zyrTEC) 10 MG tablet Take 1 tablet by mouth Every Night. 2/12/21   Amberly Gonzalez MD   cyanocobalamin 1000 MCG/ML injection Inject 1,000 mcg into the appropriate muscle as directed by prescriber Every 28 (Twenty-Eight) Days.    Ella Andres MD   dexamethasone (DECADRON) 4 MG tablet Take 2 tablets in the morning daily on days 2, 3 & 4.  Take with food. 3/12/21   Cindy Ball MD   etoposide (TOPOSAR;VEPESID) 50 MG chemo capsule Take 1 capsule (50mg) by mouth every other day, alternate with 2 caps (100mg) every other day for 21 days, off 7 days.  Swallow whole. Refrigerate. 5/16/21   Cindy Ball MD   famotidine (PEPCID) 40 MG tablet TAKE 1 TABLET BY MOUTH EVERY MORNING BEFORE BREAKFAST 3/3/20   Mira Jeffrey APRN   folic acid (FOLVITE) 1 MG tablet Take 1,000 mcg by mouth Daily. 4/11/21   Ella Andres MD   guaiFENesin-codeine (Virtussin A/C) 100-10 MG/5ML liquid Take 5 mL by mouth Every 6 (Six) Hours As Needed for Cough. 5/7/21   Cindy Ball MD   HYDROcodone-homatropine (HYCODAN) 5-1.5 MG/5ML syrup Take 5 mL by mouth 2 (Two) Times a Day As Needed  for Cough. 6/4/21   Cindy Ball MD   K Phos [place]-Sod Phos Di & Mono (Virt-Phos 250 Neutral) 155-852-130 MG tablet TAKE 1 TABLET BY MOUTH TWICE DAILY FOR 2 DAYS 3/11/21   Ella Andres MD   MAGnesium-Oxide 400 (241.3 Mg) MG tablet tablet Take 400 mg by mouth 2 (Two) Times a Day. 4/11/21   Ella Andres MD   montelukast (SINGULAIR) 10 MG tablet TAKE 1 TABLET BY MOUTH EVERY NIGHT 5/31/21   Noemy Queen MD   ondansetron (ZOFRAN) 8 MG tablet Take 1 tablet by mouth 3 (Three) Times a Day As Needed for Nausea or Vomiting. 3/12/21   Cindy Ball MD   ondansetron ODT (ZOFRAN-ODT) 8 MG disintegrating tablet PLACE 1 TABLET UNDER THE TONGUE EVERY 8 (EIGHT) HOURS AS NEEDED FOR NAUSEA OR VOMITING. 6/1/21   Danae Gomez APRN   oxyCODONE (ROXICODONE) 10 MG tablet Take 1 tablet by mouth Every 4 (Four) Hours As Needed for Moderate Pain . 4/9/21   Fito Ram MD   oxyCODONE (ROXICODONE) 10 MG tablet Take 1 tablet by mouth Every 4 (Four) Hours As Needed for Moderate Pain . 4/9/21   Fito Ram MD   oxyCODONE (ROXICODONE) 10 MG tablet Take 1 tablet by mouth Every 4 (Four) Hours As Needed for Moderate Pain . 6/11/21   Fito Ram MD   phosphorus (K PHOS NEUTRAL) 155-852-130 MG tablet Take 1 tablet by mouth 2 (Two) Times a Day. Take 1 tablet every 12 hours for 2 doses and then stop.    Ella Andres MD   potassium chloride (K-DUR,KLOR-CON) 20 MEQ CR tablet Take 2 tablets by mouth Daily. 3/19/21   Cindy Ball MD   pregabalin (LYRICA) 100 MG capsule Take 1 capsule by mouth 3 (Three) Times a Day. 1/12/21   Fito Ram MD   sertraline (ZOLOFT) 50 MG tablet Take 50 mg by mouth Every Night.    Ella Andres MD   temazepam (RESTORIL) 30 MG capsule TAKE 1 CAPSULE BY MOUTH AT NIGHT AS NEEDED FOR SLEEP 6/8/21   Noemy Queen MD   triamterene-hydrochlorothiazide (DYAZIDE) 37.5-25 MG per capsule TAKE 1 CAPSULE BY MOUTH EVERY DAY 12/2/20    Noemy Queen MD   warfarin (COUMADIN) 1 MG tablet Take 1 tablet by mouth Daily. At 1700 as directed 6/8/21   Cindy Ball MD       Allergies:    Allergies   Allergen Reactions   • Morphine Nausea Only and Hives   • Penicillin V Potassium Rash   • Sulfa Antibiotics Rash   • Levaquin [Levofloxacin] Nausea Only and Dizziness     When taken with Coumadin   • Amoxicillin Rash       Objective   Objective     Vital Signs  Temp:  [98 °F (36.7 °C)-98.3 °F (36.8 °C)] 98 °F (36.7 °C)  Heart Rate:  [51-54] 54  Resp:  [16-18] 16  BP: (120-124)/(72-81) 124/72  SpO2:  [95 %-100 %] 98 %  on  Flow (L/min):  [2] 2;   Device (Oxygen Therapy): nasal cannula  Body mass index is 29.12 kg/m².    Physical Exam  Vitals and nursing note reviewed.   Constitutional:       Appearance: She is ill-appearing.   HENT:      Head: Normocephalic and atraumatic.      Right Ear: External ear normal.      Left Ear: External ear normal.      Nose: Congestion present.      Mouth/Throat:      Mouth: Mucous membranes are moist.   Eyes:      Pupils: Pupils are equal, round, and reactive to light.   Cardiovascular:      Rate and Rhythm: Regular rhythm. Bradycardia present.      Pulses: Normal pulses.      Heart sounds: Normal heart sounds.   Pulmonary:      Effort: Tachypnea and accessory muscle usage present.      Breath sounds: Decreased air movement present. Examination of the right-upper field reveals decreased breath sounds, wheezing and rhonchi. Examination of the left-upper field reveals decreased breath sounds, wheezing and rhonchi. Examination of the right-middle field reveals decreased breath sounds, wheezing and rhonchi. Examination of the left-middle field reveals decreased breath sounds, wheezing and rhonchi. Examination of the right-lower field reveals decreased breath sounds. Examination of the left-lower field reveals decreased breath sounds. Decreased breath sounds, wheezing and rhonchi present.   Abdominal:      General:  Bowel sounds are normal.      Palpations: Abdomen is soft.   Musculoskeletal:         General: Normal range of motion.      Cervical back: No rigidity.      Right lower leg: No edema.      Left lower leg: No edema.   Skin:     General: Skin is warm.      Capillary Refill: Capillary refill takes less than 2 seconds.      Coloration: Skin is sallow.      Findings: Ecchymosis present.   Neurological:      Mental Status: She is alert and oriented to person, place, and time. Mental status is at baseline.      Motor: Weakness present.   Psychiatric:         Attention and Perception: Attention and perception normal.         Mood and Affect: Mood is anxious.         Speech: Speech normal.         Behavior: Behavior is cooperative.         Thought Content: Thought content normal.         Cognition and Memory: Cognition and memory normal.         Judgment: Judgment normal.           Results Review:  I have personally reviewed most recent clinical lab results.      Results from last 7 days   Lab Units 06/14/21  0007 06/13/21 1930   WBC 10*3/mm3  --  4.00   HEMOGLOBIN g/dL  --  9.9*   HEMATOCRIT %  --  29.4*   PLATELETS 10*3/mm3  --  172   INR  >8.00* 7.54*     Results from last 7 days   Lab Units 06/14/21  0007 06/13/21 1930   SODIUM mmol/L  --  135*   POTASSIUM mmol/L  --  4.4   CHLORIDE mmol/L  --  98   CO2 mmol/L  --  24.0   BUN mg/dL  --  20   CREATININE mg/dL  --  1.11*   GLUCOSE mg/dL  --  96   CALCIUM mg/dL  --  8.9   ALT (SGPT) U/L  --  6   AST (SGOT) U/L  --  15   TROPONIN T ng/mL <0.010 <0.010     Estimated Creatinine Clearance: 52.6 mL/min (A) (by C-G formula based on SCr of 1.11 mg/dL (H)).  Brief Urine Lab Results     None          Microbiology Results (last 10 days)     ** No results found for the last 240 hours. **          ECG/EMG Results (most recent)     Procedure Component Value Units Date/Time    ECG 12 Lead [[id number]] Collected: 06/13/21 1913     Updated: 06/13/21 1916     QT Interval 435 ms      Narrative:      HEART RATE= 53  bpm  RR Interval= 1124  ms  DC Interval= 159  ms  P Horizontal Axis= -3  deg  P Front Axis= 47  deg  QRSD Interval= 89  ms  QT Interval= 435  ms  QRS Axis= 6  deg  T Wave Axis= 29  deg  - OTHERWISE NORMAL ECG -  Sinus bradycardia  When compared with ECG of 18-Mar-2021 13:17:10,  Significant repolarization change  Electronically Signed By:   Date and Time of Study: 2021-06-13 19:13:56              I have personally reviewed cardiac tracings and agree with findings.      Estimated Creatinine Clearance: 52.6 mL/min (A) (by C-G formula based on SCr of 1.11 mg/dL (H)).    Assessment/Plan   Assessment/Plan       Active Hospital Problems    Diagnosis  POA   • **Chest pain [R07.9]  Yes     Priority: High   • Elevated international normalized ratio (INR) due to prior anticoagulant medication ingestion [R79.1]  Yes     Priority: High   • Mucus plugging of bronchi [J98.09]  Yes     Priority: High   • Osteoporosis [M81.0]  Yes     Priority: Medium   • Lung nodule [R91.1]  Yes     Priority: Medium   • Malignant neoplasm of right ovary (CMS/HCC) [C56.1]  Yes     Priority: Medium   • Essential hypertension [I10]  Yes     Priority: Medium   • Pain of metastatic malignancy [G89.3]  Yes     Priority: Medium   • Hyperlipidemia [E78.5]  Yes     Priority: Medium   • History of DVT (deep vein thrombosis) [Z86.718]  Not Applicable     Priority: Low   • Obesity (BMI 30-39.9) [E66.9]  Yes     Priority: Low   • Depression [F32.9]  Yes     Priority: Low   • Brain metastases (CMS/HCC) [C79.31]  Yes     Priority: Low   • Long term (current) use of anticoagulants [Z79.01]  Not Applicable     Priority: Low   • Anxiety [F41.9]  Yes     Priority: Low   • Insomnia [G47.00]  Yes     Priority: Low   • Fibromyositis [M79.7]  Yes     Priority: Low      Resolved Hospital Problems   No resolved problems to display.       Chest pain:  -Initial troponin negative  -CT for PE protocol shows interval progression of extensive  osseous metastatic disease. Findings are consistent with progression of metastatic neoplasm. Worsening asymmetric interlobular septal thickening throughout the right lung with associated reticulonodular and miliary type nodular airspace disease. 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 This has worsened. Similar  findings are identified in the left lower lobe as well. Mediastinal and subcarinal adenopathy. This is felt to be metastatic and is essentially stable given differences in technique. Bronchial wall thickening. There are subtle areas of mucous plugging involving multiple bronchioles to the right lower lobe. Correlate for bronchiolitis, reactive airways lung disease or possibly aspiration  -EKG shows sinus bradycardia rate 53 when compared with EKG of 03/18/2021 significant repolarization change  -In the ED patient was given 500 NS bolus, IV Dilaudid  -Echocardiogram ordered to evaluate LV function  -Trend EKGs every 6 hours x2  -Trend troponins every 6 hours x2  -Continuous cardiac monitoring  -Vital signs every 4    Elevated INR due to long-term anticoagulation:  -Upon arrival to the ED INR 7.54  -Patient treated in ED with 5 mg p.o. vitamin K  -Continue to monitor INR, may need to administer IV vitamin K and/or FFP    Mucus plugging of bronchi:  -CT for PE protocol shows interval progression of extensive osseous metastatic disease. Findings are consistent with progression of metastatic neoplasm. Worsening asymmetric interlobular septal thickening throughout the right lung with associated reticulonodular and miliary type nodular airspace disease. 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 This has worsened. Similar  findings are identified in the left lower lobe as well. Mediastinal and subcarinal adenopathy. This is felt to be metastatic and is essentially stable given differences in technique. Bronchial wall thickening. There are subtle areas of mucous  plugging involving multiple bronchioles to the right lower lobe. Correlate for bronchiolitis, reactive airways lung disease or possibly aspiration. Small hiatal hernia. No pulmonary embolus  -Consult pulmonology for potential bronchoscopy, patient currently under the care of Dr. Esparza for lung metastasis    Ovarian CA with metastasis to brain, lungs, bone:  -Patient currently receiving oral chemotherapy  -Consult oncology, patient under the care of Dr. STEPHEN Zaman    Pain of metastatic malignancy:  -Continue home medication Roxicodone 10 mg p.o. every 4 as needed for moderate pain  -Continue home medication Hycodan 5-1.5 mg 5 mL p.o. twice daily as needed for cough  -Inspect verified    Essential hypertension  -Continue home medication Dyazide 37.5-25 mg p.o. daily    Mixed hyperlipidemia:  -Continue home medication atorvastatin 20 mg p.o. nightly    History of DVT/long-term use of anticoagulants:  -Pharmacy to dose warfarin during admission for lung metastasis     Anxiety/depression:  -Continue home medication Zoloft 50 mg p.o. nightly    Fibromatosis:  -Continue home medication Lyrica 100 mg p.o. 3 times daily  -Inspect verified    Obesity:  -Encourage dietary changes    Osteoporosis:  -Continue home medication calcium 500 mg vitamin D 5 mg p.o. twice daily    Insomnia:  -Continue home medication Restoril 30 mg p.o. nightly as needed for insomnia        VTE Prophylaxis -   Mechanical Order History:     None      Pharmalogical Order History:     None          CODE STATUS:    Code Status and Medical Interventions:   Ordered at: 06/13/21 7474     Level Of Support Discussed With:    Patient     Code Status:    CPR     Medical Interventions (Level of Support Prior to Arrest):    Full       This patient has been interviewed wearing appropriate personal protective equipment and findings discussed with the ED provider.        I discussed the patient's findings and my recommendations with patient.      Signature:Electronically  signed by IRENA Saenz, 06/14/21, 1:47 AM EDT.    [facility] [place] Hospitalist Team          Electronically signed by Gautam Hartley MD at 06/14/21 0855       Referral Orders (last 24 hours) (24h ago, onward)    None      Ambulatory Referral to Home Health [[id number]] (Order [id number])  Order  Date: 6/15/2021 Department: [facility] 2B MEDICAL INPATIENT Ordering/Authorizing: Gautam Hartley MD   Order History  Outpatient  Date/Time Action Taken User Additional Information   06/15/21 1550 Sign Dru Barroso RN Ordering Mode: Verbal with readback   Order Details    Frequency Duration Priority Order Class   None None Routine Internal Referral   Start Date/Time    Start Date   06/15/21   Order Information    Order Date Service Start Date Start Time   06/15/21 Medicine 06/15/21    Reference Links    Associated Reports External References   View Encounter Current Health Referral Information   Order Questions    Question Answer Comment   Face to Face Visit Date: 6/15/2021    Follow-up provider for Plan of Care? I treated the patient in an acute care facility and will not continue treatment after discharge.    Follow-up provider: NIYA WALLACE    Reason/Clinical Findings Mucous plugging of bronchus, Ovarian cnacer with mets to brain, lungs and bone    Describe mobility limitations that make leaving home difficult: weakness    Nursing/Therapeutic Services Requested Skilled Nursing C eval and treat   Skilled nursing orders: Other [facility] eval and treat   Frequency: 1 Week 1           Verbal Order Info    Action Created on Order Mode Entered by Responsible Provider Signed by Signed on   Ordering 06/15/21 1550 Verbal with readback Dru Barroso RN Salcedo, Federico N, MD     Source Order Set / Preference List    Preference List   [facility] FACILITY REFERRALS [[id number]]   Clinical Indications     ICD-10-CM ICD-9-CM   Chest pain, unspecified type  - Primary     R07.9 786.50   Shortness of breath     R06.02  786.05   Asthma, unspecified asthma severity, unspecified whether complicated, unspecified whether persistent     J45.909 493.90   Mucus plugging of bronchi     J98.09 519.19   Reprint Order Requisition    Ambulatory Referral to Home Health (Order #[id number]) on 6/15/21   Encounter    View Encounter          Order Provider Info        Office phone Pager E-mail   Ordering User Dru Barroso RN -- -- --   Authorizing Provider Gautam Hartley [phone] -- --   Attending Provider Gautam Hartley [phone] -- --   Entered By Dru Barroso RN -- -- --   Ordering Provider Gautam Hartley [phone] -- --   Linked Charges    No charges linked to this procedure   Tracking Reports  Cosign Tracking Order Transmittal Tracking   Authorized by:  Gautam Hartley MD  (NPI: [id number])       Lab Component SmartPhrase Guide    Ambulatory Referral to Home Health (Order #[id number]) on 6/15/21

## 2021-06-15 NOTE — NURSING NOTE
Unable to get morning labs from port, patient repositioned multiple times attempting to draw blood, however no blood returned. Port flushes with no problems and blood was noted when accessed. Pt states this difficulty is normal. Changed from unit collect back to lab.

## 2021-06-15 NOTE — CASE MANAGEMENT/SOCIAL WORK
Discharge Planning Assessment   [facility]     Patient Name: Tahira Zimmerman  MRN: [id number]  Today's Date: 6/15/2021    Admit Date: 6/13/2021    Discharge Needs Assessment     Row Name 06/15/21 1529       Living Environment    Lives With  child(quinn), adult    Name(s) of Who Lives With Patient  Daughter Pati Joiner, her spouse and 2 children.    Current Living Arrangements  home/apartment/condo    Primary Care Provided by  child(quinn)    Provides Primary Care For  no one, unable/limited ability to care for self    Family Caregiver if Needed  child(quinn), adult    Family Caregiver Names  Pati    Able to Return to Prior Arrangements  yes       Resource/Environmental Concerns    Resource/Environmental Concerns  none       Transition Planning    Patient/Family Anticipates Transition to  home with family    Patient/Family Anticipated Services at Transition  durable medical equipment       Discharge Needs Assessment    Readmission Within the Last 30 Days  no previous admission in last 30 days    Equipment Currently Used at Home  none    Equipment Needed After Discharge  nebulizer;oxygen    Discharge Coordination/Progress  DC Plan: Home with daughter and family. [facility] for home O2 and nebulizer.        Discharge Plan    No documentation.       Continued Care and Services - Admitted Since 6/13/2021     Durable Medical Equipment     Service Provider Request Status Selected Services Address Phone Fax Patient Preferred    [facility]  Pending - Request Sent N/A [address] [facility] [id number] [address] [phone] --              Expected Discharge Date and Time     Expected Discharge Date Expected Discharge Time    Karthik 15, 2021         Demographic Summary    No documentation.       Functional Status     Row Name 06/15/21 1528       Functional Status    Usual Activity Tolerance  fair    Current Activity Tolerance  fair       Functional Status, IADL    Medications  assistive person    Meal  Preparation  assistive person    Housekeeping  assistive person    Laundry  assistive person    Shopping  assistive person       Mental Status    General Appearance WDL  WDL       Mental Status Summary    Recent Changes in Mental Status/Cognitive Functioning  no changes        Met with patient in room wearing PPE: mask, goggles.      Maintained distance greater than six feet and spent less than 15 minutes in the room.               Dru Barroso RN

## 2021-06-15 NOTE — DISCHARGE PLACEMENT REQUEST
"Tahira Roblero (65 y.o. Female)     Date of Birth Social Security Number Address Home Phone MRN    1955  [id number] [facility] [address] [phone] [id number]    Sikhism Marital Status          Sev[employer] orthodox        Admission Date Admission Type Admitting Provider Attending Provider Department, Room/Bed    6/13/21 Emergency Gautam Hartley MD Salcedo, Federico N, MD [facility] 2B MEDICAL INPATIENT, 220/1    Discharge Date Discharge Disposition Discharge Destination         Home or Self Care              Attending Provider: Gautam Hartley MD    Allergies: Morphine, Penicillin V Potassium, Sulfa Antibiotics, Levaquin [Levofloxacin], Amoxicillin    Isolation: None   Infection: None   Code Status: CPR    Ht: 165.1 cm (65\")   Wt: 80.2 kg (176 lb 14.4 oz)    Admission Cmt: None   Principal Problem: Malignant neoplasm of right ovary (CMS/HCC) [C56.1] More...                 Active Insurance as of 6/13/2021     Primary Coverage     Payor Plan Insurance Group Employer/Plan Group    MEDICARE MEDICARE A & B      Payor Plan Address Payor Plan Phone Number Payor Plan Fax Number Effective Dates    [address] [phone]  7/1/2020 - None Entered    [facility] [address]       Subscriber Name Subscriber Birth Date Member ID       TAHIRA ROBLERO 1955 [id number]           Secondary Coverage     Payor Plan Insurance Group Employer/Plan Group    B[employer]      Payor Plan Address Payor Plan Phone Number Payor Plan Fax Number Effective Dates    [address] [phone]  7/1/2020 - None Entered    [place] [address]       Subscriber Name Subscriber Birth Date Member ID       TAHIRA ROBLERO 1955 [id number]           Tertiary Coverage     Payor Plan Insurance Group Employer/Plan Group    [place]      Payor Plan Address Payor Plan Phone Number Payor Plan Fax Number Effective Dates    [address]   2020 - None Entered    [place] [address]       Subscriber Name Subscriber Birth Date Member ID       TAHIRA ROBLERO 1955 [id number]                 Emergency Contacts      (Rel.) Home Phone Work Phone Mobile Phone    DANIELLE MOLINA (Daughter) [phone] -- [phone]               History & Physical      Lillian Starr APRN at 21 2222     Attestation signed by Gautam Hartley MD at 21 0855    I have reviewed the notes, assessments, and/or procedures performed by *Lillian Starr APRN, I concur with her/his documentation of Tahira Roblero.                         [facility]      Patient Name: Tahira Roblero  : 1955  MRN: [id number]  Primary Care Physician: Noemy Queen MD  Date of admission: 2021    Patient Care Team:  Noemy Queen MD as PCP - General (Family Medicine)  Cindy Ball MD as Consulting Physician (Hematology and Oncology)          Subjective   History Present Illness     Chief Complaint:   Chief Complaint   Patient presents with   • Chest Pain       Chief complaint: Chest pain         History of present illness:    Ms. Roblero is a 65 y.o. w/PMH of ovarian CA with metastasis to bone and brain currently on oral chemotherapy, HLD, HTN, DVT, obesity, depression, MELANI, RLS, fibromatosis who presents to [facility] ED due to chest pain. Patient states 10/10 sharp midsternal chest pain with radiation to right chest wall began this evening while doing laundry, no aggravating or alleviating factors noted.  Patient admits to cough, dyspnea, congestion, hoarseness, weakness, joint pain.  Patient denies fever, chills, nausea, vomiting, palpitations.  As dyspnea did not improve she decided to go to [facility] ED        On arrival to the ED vitals temp 98.3, HR 54, RR 18, /81, O2 sats 100% on room air.  Troponin less than 0.010, glucose 96, ,  K4.4, CO2 24, creatinine 1.11, BUN 20, alkaline phosphatase 121, ALT 6, AST 15, total bili 0.3, INR 7.54, PTT 75.3, PTT 54.8, WBC 4.0, Hgb 9.9, platelets 172, neutrophils 74.9.  EKG shows sinus bradycardia rate 53 when compared with EKG of 03/18/2021 significant repolarization change.  Chest x-ray shows left subclavian Port-A-Cath in place, heart size at the upper limits of normal, ill-defined right upper lobe airspace disease suspicious for pneumonia, hazy bibasilar airspace disease similar to prior study likely due to chronic lung disease.  CT for PE protocol shows interval progression of extensive osseous metastatic disease. Findings are consistent with progression of metastatic neoplasm. Worsening asymmetric interlobular septal thickening throughout the right lung with associated reticulonodular and miliary type nodular airspace disease. 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 This has worsened. Similar  findings are identified in the left lower lobe as well. Mediastinal and subcarinal adenopathy. This is felt to be metastatic and is essentially stable given differences in technique. Bronchial wall thickening. There are subtle areas of mucous plugging involving multiple bronchioles to the right lower lobe. Correlate for bronchiolitis, reactive airways lung disease or possibly aspiration. Small hiatal hernia. No pulmonary embolus.  Patient treated in the ED with 5 mg p.o. vitamin K, 500 NS bolus, IV Dilaudid.    History of Present Illness    Review of Systems   Constitutional: Negative for chills and fever.   HENT: Positive for congestion and hoarse voice.    Eyes: Negative.  Negative for visual disturbance.   Cardiovascular: Positive for chest pain, dyspnea on exertion and irregular heartbeat.   Respiratory: Positive for cough, shortness of breath and sleep disturbances due to breathing.    Endocrine: Negative.    Hematologic/Lymphatic: Positive for bleeding problem. Bruises/bleeds  easily.   Skin: Positive for dry skin.   Musculoskeletal: Positive for back pain, joint pain, joint swelling, muscle weakness and myalgias.   Gastrointestinal: Negative.    Genitourinary: Negative.    Neurological: Positive for weakness.   Psychiatric/Behavioral: Positive for depression. The patient is nervous/anxious.    Allergic/Immunologic: Negative.    All other systems reviewed and are negative.          Personal History     Past Medical History:   Past Medical History:   Diagnosis Date   • Anemia 2013   • Cervical disc disorder 2013    Herniated disc, pinched nerve   • Clotting disorder (CMS/[facility]) 2013    Low platelets from chemo   • Colon polyp 2013   • Deep vein thrombosis (CMS/[facility]) 2013   • Diverticulosis 2013   • GERD (gastroesophageal reflux disease) 2016   • HL (hearing loss) 2016    I need hearing aids   • Hyperlipidemia 2013    Need my cholesterol rechecked   • Hypertension    • Joint pain 2013    Shoulder pain, torn rotator cuff   • Low back pain 2013   • Neuromuscular disorder (CMS/[facility]) 2015    Shingles, pinched nerves in my neck   • Osteopenia 2014   • Osteoporosis    • Ovarian cancer (CMS/[facility])     ovarian--  spread to lungs 10/2020   • Ovarian cancer on left (CMS/[facility]) 1995   • Pneumonia 2010    Have had it several times   • Spinal stenosis 2013    Cervical   • Urinary tract infection 2013    Have had several utis       Surgical History:      Past Surgical History:   Procedure Laterality Date   • BRONCHOSCOPY N/A 2/10/2021    Procedure: BRONCHOSCOPY with bronchioalveolar lavage;  Surgeon: Jerica Esparza MD;  Location: [facility] ENDOSCOPY;  Service: Pulmonary;  Laterality: N/A;  post op:right lobe pneumonia   • CATARACT EXTRACTION     • COLON SURGERY     • COLONOSCOPY  05/26/2018   • EXPLORATORY LAPAROTOMY     • HERNIA REPAIR     • HYSTERECTOMY     • OOPHORECTOMY         Family History: family history includes Cancer in her father; Hypertension in her brother, father, mother, and sister; Stroke in her  brother.    Social History:  reports that she has never smoked. She has never used smokeless tobacco. She reports that she does not drink alcohol and does not use drugs.      Medications:  Prior to Admission medications    Medication Sig Start Date End Date Taking? Authorizing Provider   acetaminophen (TYLENOL) 500 MG tablet Take 1,000 mg by mouth Every 6 (Six) Hours As Needed for Mild Pain .    Ella Andres MD   albuterol sulfate  (90 Base) MCG/ACT inhaler Inhale 2 puffs Every 4 (Four) Hours As Needed for Wheezing. 1/13/21   Noemy Queen MD   atorvastatin (LIPITOR) 20 MG tablet Take 20 mg by mouth every night at bedtime. 5/11/19   Ella Andres MD   calcium carbonate (Calcium 600) 600 MG tablet Take 1 tablet by mouth 2 (two) times a day. 2/1/21   Cindy Ball MD   cetirizine (zyrTEC) 10 MG tablet Take 1 tablet by mouth Every Night. 2/12/21   Amberly Gonzalez MD   cyanocobalamin 1000 MCG/ML injection Inject 1,000 mcg into the appropriate muscle as directed by prescriber Every 28 (Twenty-Eight) Days.    Ella Andres MD   dexamethasone (DECADRON) 4 MG tablet Take 2 tablets in the morning daily on days 2, 3 & 4.  Take with food. 3/12/21   Cindy Ball MD   etoposide (TOPOSAR;VEPESID) 50 MG chemo capsule Take 1 capsule (50mg) by mouth every other day, alternate with 2 caps (100mg) every other day for 21 days, off 7 days.  Swallow whole. Refrigerate. 5/16/21   Cindy Ball MD   famotidine (PEPCID) 40 MG tablet TAKE 1 TABLET BY MOUTH EVERY MORNING BEFORE BREAKFAST 3/3/20   Mira Jeffrey APRN   folic acid (FOLVITE) 1 MG tablet Take 1,000 mcg by mouth Daily. 4/11/21   Ella Andres MD   guaiFENesin-codeine (Virtussin A/C) 100-10 MG/5ML liquid Take 5 mL by mouth Every 6 (Six) Hours As Needed for Cough. 5/7/21   Cindy Ball MD   HYDROcodone-homatropine (HYCODAN) 5-1.5 MG/5ML syrup Take 5 mL by mouth 2 (Two) Times a Day As Needed  for Cough. 6/4/21   Cindy Ball MD   K Phos [place]-Sod Phos Di & Mono (Virt-Phos 250 Neutral) 155-852-130 MG tablet TAKE 1 TABLET BY MOUTH TWICE DAILY FOR 2 DAYS 3/11/21   Ella Andres MD   MAGnesium-Oxide 400 (241.3 Mg) MG tablet tablet Take 400 mg by mouth 2 (Two) Times a Day. 4/11/21   Ella Andres MD   montelukast (SINGULAIR) 10 MG tablet TAKE 1 TABLET BY MOUTH EVERY NIGHT 5/31/21   Noemy Queen MD   ondansetron (ZOFRAN) 8 MG tablet Take 1 tablet by mouth 3 (Three) Times a Day As Needed for Nausea or Vomiting. 3/12/21   Cindy Ball MD   ondansetron ODT (ZOFRAN-ODT) 8 MG disintegrating tablet PLACE 1 TABLET UNDER THE TONGUE EVERY 8 (EIGHT) HOURS AS NEEDED FOR NAUSEA OR VOMITING. 6/1/21   Danae Gomez APRN   oxyCODONE (ROXICODONE) 10 MG tablet Take 1 tablet by mouth Every 4 (Four) Hours As Needed for Moderate Pain . 4/9/21   Fito Ram MD   oxyCODONE (ROXICODONE) 10 MG tablet Take 1 tablet by mouth Every 4 (Four) Hours As Needed for Moderate Pain . 4/9/21   Fito Ram MD   oxyCODONE (ROXICODONE) 10 MG tablet Take 1 tablet by mouth Every 4 (Four) Hours As Needed for Moderate Pain . 6/11/21   Fito Ram MD   phosphorus (K PHOS NEUTRAL) 155-852-130 MG tablet Take 1 tablet by mouth 2 (Two) Times a Day. Take 1 tablet every 12 hours for 2 doses and then stop.    Ella Andres MD   potassium chloride (K-DUR,KLOR-CON) 20 MEQ CR tablet Take 2 tablets by mouth Daily. 3/19/21   Cindy Ball MD   pregabalin (LYRICA) 100 MG capsule Take 1 capsule by mouth 3 (Three) Times a Day. 1/12/21   Fito Ram MD   sertraline (ZOLOFT) 50 MG tablet Take 50 mg by mouth Every Night.    Ella Andres MD   temazepam (RESTORIL) 30 MG capsule TAKE 1 CAPSULE BY MOUTH AT NIGHT AS NEEDED FOR SLEEP 6/8/21   Noemy Queen MD   triamterene-hydrochlorothiazide (DYAZIDE) 37.5-25 MG per capsule TAKE 1 CAPSULE BY MOUTH EVERY DAY 12/2/20    Noemy Queen MD   warfarin (COUMADIN) 1 MG tablet Take 1 tablet by mouth Daily. At 1700 as directed 6/8/21   Cindy Ball MD       Allergies:    Allergies   Allergen Reactions   • Morphine Nausea Only and Hives   • Penicillin V Potassium Rash   • Sulfa Antibiotics Rash   • Levaquin [Levofloxacin] Nausea Only and Dizziness     When taken with Coumadin   • Amoxicillin Rash       Objective   Objective     Vital Signs  Temp:  [98 °F (36.7 °C)-98.3 °F (36.8 °C)] 98 °F (36.7 °C)  Heart Rate:  [51-54] 54  Resp:  [16-18] 16  BP: (120-124)/(72-81) 124/72  SpO2:  [95 %-100 %] 98 %  on  Flow (L/min):  [2] 2;   Device (Oxygen Therapy): nasal cannula  Body mass index is 29.12 kg/m².    Physical Exam  Vitals and nursing note reviewed.   Constitutional:       Appearance: She is ill-appearing.   HENT:      Head: Normocephalic and atraumatic.      Right Ear: External ear normal.      Left Ear: External ear normal.      Nose: Congestion present.      Mouth/Throat:      Mouth: Mucous membranes are moist.   Eyes:      Pupils: Pupils are equal, round, and reactive to light.   Cardiovascular:      Rate and Rhythm: Regular rhythm. Bradycardia present.      Pulses: Normal pulses.      Heart sounds: Normal heart sounds.   Pulmonary:      Effort: Tachypnea and accessory muscle usage present.      Breath sounds: Decreased air movement present. Examination of the right-upper field reveals decreased breath sounds, wheezing and rhonchi. Examination of the left-upper field reveals decreased breath sounds, wheezing and rhonchi. Examination of the right-middle field reveals decreased breath sounds, wheezing and rhonchi. Examination of the left-middle field reveals decreased breath sounds, wheezing and rhonchi. Examination of the right-lower field reveals decreased breath sounds. Examination of the left-lower field reveals decreased breath sounds. Decreased breath sounds, wheezing and rhonchi present.   Abdominal:      General:  Bowel sounds are normal.      Palpations: Abdomen is soft.   Musculoskeletal:         General: Normal range of motion.      Cervical back: No rigidity.      Right lower leg: No edema.      Left lower leg: No edema.   Skin:     General: Skin is warm.      Capillary Refill: Capillary refill takes less than 2 seconds.      Coloration: Skin is sallow.      Findings: Ecchymosis present.   Neurological:      Mental Status: She is alert and oriented to person, place, and time. Mental status is at baseline.      Motor: Weakness present.   Psychiatric:         Attention and Perception: Attention and perception normal.         Mood and Affect: Mood is anxious.         Speech: Speech normal.         Behavior: Behavior is cooperative.         Thought Content: Thought content normal.         Cognition and Memory: Cognition and memory normal.         Judgment: Judgment normal.           Results Review:  I have personally reviewed most recent clinical lab results.      Results from last 7 days   Lab Units 06/14/21  0007 06/13/21 1930   WBC 10*3/mm3  --  4.00   HEMOGLOBIN g/dL  --  9.9*   HEMATOCRIT %  --  29.4*   PLATELETS 10*3/mm3  --  172   INR  >8.00* 7.54*     Results from last 7 days   Lab Units 06/14/21  0007 06/13/21 1930   SODIUM mmol/L  --  135*   POTASSIUM mmol/L  --  4.4   CHLORIDE mmol/L  --  98   CO2 mmol/L  --  24.0   BUN mg/dL  --  20   CREATININE mg/dL  --  1.11*   GLUCOSE mg/dL  --  96   CALCIUM mg/dL  --  8.9   ALT (SGPT) U/L  --  6   AST (SGOT) U/L  --  15   TROPONIN T ng/mL <0.010 <0.010     Estimated Creatinine Clearance: 52.6 mL/min (A) (by C-G formula based on SCr of 1.11 mg/dL (H)).  Brief Urine Lab Results     None          Microbiology Results (last 10 days)     ** No results found for the last 240 hours. **          ECG/EMG Results (most recent)     Procedure Component Value Units Date/Time    ECG 12 Lead [[id number]] Collected: 06/13/21 1913     Updated: 06/13/21 1916     QT Interval 435 ms      Narrative:      HEART RATE= 53  bpm  RR Interval= 1124  ms  WA Interval= 159  ms  P Horizontal Axis= -3  deg  P Front Axis= 47  deg  QRSD Interval= 89  ms  QT Interval= 435  ms  QRS Axis= 6  deg  T Wave Axis= 29  deg  - OTHERWISE NORMAL ECG -  Sinus bradycardia  When compared with ECG of 18-Mar-2021 13:17:10,  Significant repolarization change  Electronically Signed By:   Date and Time of Study: 2021-06-13 19:13:56              I have personally reviewed cardiac tracings and agree with findings.      Estimated Creatinine Clearance: 52.6 mL/min (A) (by C-G formula based on SCr of 1.11 mg/dL (H)).    Assessment/Plan   Assessment/Plan       Active Hospital Problems    Diagnosis  POA   • **Chest pain [R07.9]  Yes     Priority: High   • Elevated international normalized ratio (INR) due to prior anticoagulant medication ingestion [R79.1]  Yes     Priority: High   • Mucus plugging of bronchi [J98.09]  Yes     Priority: High   • Osteoporosis [M81.0]  Yes     Priority: Medium   • Lung nodule [R91.1]  Yes     Priority: Medium   • Malignant neoplasm of right ovary (CMS/HCC) [C56.1]  Yes     Priority: Medium   • Essential hypertension [I10]  Yes     Priority: Medium   • Pain of metastatic malignancy [G89.3]  Yes     Priority: Medium   • Hyperlipidemia [E78.5]  Yes     Priority: Medium   • History of DVT (deep vein thrombosis) [Z86.718]  Not Applicable     Priority: Low   • Obesity (BMI 30-39.9) [E66.9]  Yes     Priority: Low   • Depression [F32.9]  Yes     Priority: Low   • Brain metastases (CMS/HCC) [C79.31]  Yes     Priority: Low   • Long term (current) use of anticoagulants [Z79.01]  Not Applicable     Priority: Low   • Anxiety [F41.9]  Yes     Priority: Low   • Insomnia [G47.00]  Yes     Priority: Low   • Fibromyositis [M79.7]  Yes     Priority: Low      Resolved Hospital Problems   No resolved problems to display.       Chest pain:  -Initial troponin negative  -CT for PE protocol shows interval progression of extensive  osseous metastatic disease. Findings are consistent with progression of metastatic neoplasm. Worsening asymmetric interlobular septal thickening throughout the right lung with associated reticulonodular and miliary type nodular airspace disease. 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 This has worsened. Similar  findings are identified in the left lower lobe as well. Mediastinal and subcarinal adenopathy. This is felt to be metastatic and is essentially stable given differences in technique. Bronchial wall thickening. There are subtle areas of mucous plugging involving multiple bronchioles to the right lower lobe. Correlate for bronchiolitis, reactive airways lung disease or possibly aspiration  -EKG shows sinus bradycardia rate 53 when compared with EKG of 03/18/2021 significant repolarization change  -In the ED patient was given 500 NS bolus, IV Dilaudid  -Echocardiogram ordered to evaluate LV function  -Trend EKGs every 6 hours x2  -Trend troponins every 6 hours x2  -Continuous cardiac monitoring  -Vital signs every 4    Elevated INR due to long-term anticoagulation:  -Upon arrival to the ED INR 7.54  -Patient treated in ED with 5 mg p.o. vitamin K  -Continue to monitor INR, may need to administer IV vitamin K and/or FFP    Mucus plugging of bronchi:  -CT for PE protocol shows interval progression of extensive osseous metastatic disease. Findings are consistent with progression of metastatic neoplasm. Worsening asymmetric interlobular septal thickening throughout the right lung with associated reticulonodular and miliary type nodular airspace disease. 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 This has worsened. Similar  findings are identified in the left lower lobe as well. Mediastinal and subcarinal adenopathy. This is felt to be metastatic and is essentially stable given differences in technique. Bronchial wall thickening. There are subtle areas of mucous  plugging involving multiple bronchioles to the right lower lobe. Correlate for bronchiolitis, reactive airways lung disease or possibly aspiration. Small hiatal hernia. No pulmonary embolus  -Consult pulmonology for potential bronchoscopy, patient currently under the care of Dr. Esparza for lung metastasis    Ovarian CA with metastasis to brain, lungs, bone:  -Patient currently receiving oral chemotherapy  -Consult oncology, patient under the care of Dr. STEPHEN Zaman    Pain of metastatic malignancy:  -Continue home medication Roxicodone 10 mg p.o. every 4 as needed for moderate pain  -Continue home medication Hycodan 5-1.5 mg 5 mL p.o. twice daily as needed for cough  -Inspect verified    Essential hypertension  -Continue home medication Dyazide 37.5-25 mg p.o. daily    Mixed hyperlipidemia:  -Continue home medication atorvastatin 20 mg p.o. nightly    History of DVT/long-term use of anticoagulants:  -Pharmacy to dose warfarin during admission for lung metastasis     Anxiety/depression:  -Continue home medication Zoloft 50 mg p.o. nightly    Fibromatosis:  -Continue home medication Lyrica 100 mg p.o. 3 times daily  -Inspect verified    Obesity:  -Encourage dietary changes    Osteoporosis:  -Continue home medication calcium 500 mg vitamin D 5 mg p.o. twice daily    Insomnia:  -Continue home medication Restoril 30 mg p.o. nightly as needed for insomnia        VTE Prophylaxis -   Mechanical Order History:     None      Pharmalogical Order History:     None          CODE STATUS:    Code Status and Medical Interventions:   Ordered at: 06/13/21 2358     Level Of Support Discussed With:    Patient     Code Status:    CPR     Medical Interventions (Level of Support Prior to Arrest):    Full       This patient has been interviewed wearing appropriate personal protective equipment and findings discussed with the ED provider.        I discussed the patient's findings and my recommendations with patient.      Signature:Electronically  signed by IRENA Saenz, 06/14/21, 1:47 AM EDT.    [facility] Hospitalist Team          Electronically signed by Gautam Hartley MD at 06/14/21 0855       Referral Orders (last 24 hours) (24h ago, onward)    None

## 2021-06-15 NOTE — PROGRESS NOTES
Daily Progress Note    Chest pain    Malignant neoplasm of right ovary (CMS/HCC)    Long term (current) use of anticoagulants    Pain of metastatic malignancy    Hyperlipidemia    Essential hypertension    Fibromyositis    Anxiety    Brain metastases (CMS/HCC)    Insomnia    Lung nodule    Osteoporosis    History of DVT (deep vein thrombosis)    Obesity (BMI 30-39.9)    Depression    Elevated international normalized ratio (INR) due to prior anticoagulant medication ingestion    Mucus plugging of bronchi    Assessment:     INR down to 1.51     Dyspnea on exertion  Persistent cough  Chronic bronchitis,      CT chest 5/5/2021  Significant interval worsening of interstitial, reticular nodular,  and groundglass densities in the right lung, particularly in the right  upper lobe, since 3/1/2021. FINDINGS are worrisome for progressive  lymphangitic spread of tumor     Bronchoscopy 02/10/2021  Mild inflammatory changes were noted  Diagnostic BAL right lower lobe was done  COVID-19 test negative on 2/9/2021  History of ovarian cancer with metastasis     COVID 19 negative     Plan     Bronchoscopy today will be scheduled for persistent cough and micronodular lung disease, tree-in-bud, groundglass alveolitis discussed with patient       Currently on mucomyst nebs/duo-nebs  Solumederol 40 mg IV                LOS: 0 days       Subjective         Objective     Vital signs for last 24 hours:  Vitals:    06/14/21 2036 06/14/21 2234 06/14/21 2239 06/15/21 0418   BP: 108/65   103/59   BP Location: Left arm   Left arm   Patient Position: Sitting   Lying   Pulse: 88 90 76 67   Resp: 17 18 18 17   Temp: 98.3 °F (36.8 °C)   97.7 °F (36.5 °C)   TempSrc: Oral   Oral   SpO2: 93% 94% 95% 95%   Weight:    80.2 kg (176 lb 14.4 oz)   Height:           Intake/Output last 3 shifts:  No intake/output data recorded.  Intake/Output this shift:  No intake/output data recorded.      Radiology  Imaging Results (Last 24 Hours)     ** No results found  for the last 24 hours. **          Labs:  Results from last 7 days   Lab Units 06/14/21  0410   WBC 10*3/mm3 3.60   HEMOGLOBIN g/dL 9.7*   HEMATOCRIT % 29.3*   PLATELETS 10*3/mm3 136*     Results from last 7 days   Lab Units 06/14/21  0410 06/13/21  1930   SODIUM mmol/L 136 135*   POTASSIUM mmol/L 3.8 4.4   CHLORIDE mmol/L 100 98   CO2 mmol/L 25.0 24.0   BUN mg/dL 20 20   CREATININE mg/dL 1.06* 1.11*   CALCIUM mg/dL 8.6 8.9   BILIRUBIN mg/dL  --  0.3   ALK PHOS U/L  --  121*   ALT (SGPT) U/L  --  6   AST (SGOT) U/L  --  15   GLUCOSE mg/dL 132* 96         Results from last 7 days   Lab Units 06/13/21  1930   ALBUMIN g/dL 3.70     Results from last 7 days   Lab Units 06/14/21  0557 06/14/21  0007 06/13/21  1930   TROPONIN T ng/mL <0.010 <0.010 <0.010         Results from last 7 days   Lab Units 06/14/21  0410   MAGNESIUM mg/dL 1.7     Results from last 7 days   Lab Units 06/14/21  1410 06/14/21  0410 06/14/21  0007 06/13/21  1930   INR  1.51* 4.30* >8.00* 7.54*   APTT seconds  --   --   --  54.8*               Meds:   SCHEDULE  acetylcysteine, 2 mL, Nebulization, BID - RT  atorvastatin, 20 mg, Oral, Daily  calcium 500 mg vitamin D 5 mcg (200 UT), 1 tablet, Oral, BID  cetirizine, 10 mg, Oral, Nightly  folic acid, 1,000 mcg, Oral, Daily  ipratropium-albuterol, 3 mL, Nebulization, Q4H - RT  magnesium oxide, 400 mg, Oral, BID  methylPREDNISolone sodium succinate, 40 mg, Intravenous, Q8H  montelukast, 10 mg, Oral, Nightly  pantoprazole, 40 mg, Oral, BID AC  potassium chloride, 40 mEq, Oral, Daily  pregabalin, 100 mg, Oral, TID  sertraline, 50 mg, Oral, Nightly  sodium chloride, 10 mL, Intravenous, Q12H  theophylline, 300 mg, Oral, Q24H  triamterene-hydrochlorothiazide, 1 tablet, Oral, Daily      Infusions  Pharmacy to dose warfarin,   sodium chloride, 100 mL/hr, Last Rate: 100 mL/hr (06/14/21 0045)      PRNs  •  acetaminophen **OR** acetaminophen **OR** acetaminophen  •  albuterol  •  aluminum-magnesium  hydroxide-simethicone  •  HYDROcodone-homatropine  •  magnesium sulfate **OR** magnesium sulfate in D5W 1g/100mL (PREMIX)  •  melatonin  •  nitroglycerin  •  ondansetron **OR** ondansetron  •  oxyCODONE  •  Pharmacy to dose warfarin  •  potassium chloride  •  sodium chloride  •  [COMPLETED] Insert peripheral IV **AND** sodium chloride  •  temazepam    Physical Exam:  Physical Exam    ROS  Review of Systems    I reviewed the patient's new clinical results.      Electronically signed by IRENA Amador, 06/15/21 at 06:30 EDT.

## 2021-06-15 NOTE — PROGRESS NOTES
"Pharmacy dosing service  Anticoagulant  Warfarin     Subjective:    Tahira Zimmerman is a 65 y.o.female being continued on warfarin for DVT.    INR Goal: 2 - 3  Home medication?: Yes, warfarin 5.5 mg PO every day at 1800   Bridge Therapy Present?:  No  Interacting Medications Evaluation (New/Present/Discontinued): etoposide PO (may increase INR) - not continued   Additional Contributing Factors: Hx DVT/PE, has onco therapy plan including PO etoposide for ovarian cancer with brain, lung, bone mets       Assessment/Plan:    INR significantly supratherapeutic on admission (>8), warfarin held, and IV/PO vit K administered. Upon review of outpatient anticoagulation notes, INR was 3.3 on 6/11 and patient instructed to decrease dose from 6 mg daily to 5.5 mg daily (has 1 mg and 5 mg tablets at home). Patient to undergo bronch today. Spoke to Dr. Hartley who stated ok to resume warfarin tonight after bronch. Likely will take INR several days to recover from vit K administration and may require bolus doses to achieve therapeutic INR but given recent elevation, will proceed cautiously. Will order lower warfarin dose of 5 mg today - has previously been therapeutic on this regimen per outpatient notes.      Note - Clarifying with MD if Lovenox bridge (either treatment or ppx) desired while INR subtherapeutic. Pending response.     Continue to monitor and adjust based on INR.         Date 6/13 6/14 6/15         INR 7.54 4.30 1.13         Dose Vit K 5mg PO x1  HOLD Vit K 5 mg and 2.5mg  IV x1   HOLD 5 mg             Objective:  [Ht: 165.1 cm (65\"); Wt: 80.2 kg (176 lb 14.4 oz); BMI: Body mass index is 29.44 kg/m².]    Lab Results   Component Value Date    ALBUMIN 3.70 06/13/2021     Lab Results   Component Value Date    INR 1.13 (L) 06/15/2021    INR 1.51 (H) 06/14/2021    INR 4.30 (H) 06/14/2021    PROTIME 12.4 (L) 06/15/2021    PROTIME 16.3 (H) 06/14/2021    PROTIME 44.1 (H) 06/14/2021     Lab Results   Component Value " Date    HGB 8.7 (L) 06/15/2021    HGB 9.7 (L) 06/14/2021    HGB 9.9 (L) 06/13/2021     Lab Results   Component Value Date    HCT 26.6 (L) 06/15/2021    HCT 29.3 (L) 06/14/2021    HCT 29.4 (L) 06/13/2021       Мария Pandey, PharmPABLO  06/15/21 11:08 EDT

## 2021-06-15 NOTE — ANESTHESIA PREPROCEDURE EVALUATION
Anesthesia Evaluation     Patient summary reviewed and Nursing notes reviewed   NPO Solid Status: > 8 hours  NPO Liquid Status: > 8 hours           Airway   Mallampati: II  TM distance: >3 FB  Neck ROM: full  No difficulty expected  Dental - normal exam     Pulmonary - normal exam    breath sounds clear to auscultation  (+) pneumonia , shortness of breath,   Cardiovascular - normal exam  Exercise tolerance: unable to assess    ECG reviewed  Rhythm: regular  Rate: normal    (+) hypertension, DVT, hyperlipidemia,     ROS comment: Interpretation Summary    · Left ventricular ejection fraction appears to be 56 - 60%.  · No pericardial effusion noted        Neuro/Psych  (+) headaches, numbness, psychiatric history Anxiety,     GI/Hepatic/Renal/Endo    (+) obesity,  GERD,      Musculoskeletal     (+) neck pain,   Abdominal  - normal exam   Substance History - negative use     OB/GYN negative ob/gyn ROS         Other      history of cancer    ROS/Med Hx Other: Metastatic Uterine ca to Lung                  Anesthesia Plan    ASA 4     MAC     intravenous induction     Anesthetic plan, all risks, benefits, and alternatives have been provided, discussed and informed consent has been obtained with: patient.

## 2021-06-15 NOTE — CASE MANAGEMENT/SOCIAL WORK
Continued Stay Note   [facility]     Patient Name: Tahira Zimmerman  MRN: [id number]  Today's Date: 6/15/2021    Admit Date: 6/13/2021    Discharge Plan     Row Name 06/15/21 1552       Plan    Plan  DC Plan: Return home with daughter's family and  [facility] [place], pending acceptance, [facility] for home O2 and nebulizer.    Plan Comments  Palliative has seen pt.        Met with patient in room wearing PPE: mask, goggles.      Maintained distance greater than six feet and spent less than 15 minutes in the room.           Expected Discharge Date and Time     Expected Discharge Date Expected Discharge Time    Karthik 15, 2021             Dru Barroso RN

## 2021-06-15 NOTE — ANESTHESIA POSTPROCEDURE EVALUATION
Patient: Tahira Zimmerman    Procedure Summary     Date: 06/15/21 Room / [facility]    Anesthesia Start: 1220 Anesthesia Stop: 1244    Procedure: BRONCHOSCOPY with bronchoalveolar lavage (N/A Bronchus) Diagnosis:       Mucus plugging of bronchi      (Mucus plugging of bronchi [J98.09])    Surgeons: Jerica Esparza MD Provider: Jose Kwan MD    Anesthesia Type: MAC ASA Status: 4          Anesthesia Type: MAC    Vitals  No vitals data found for the desired time range.          Post Anesthesia Care and Evaluation    Patient location during evaluation: PACU  Patient participation: complete - patient participated  Level of consciousness: awake  Pain scale: See nurse's notes for pain score.  Pain management: adequate  Airway patency: patent  Anesthetic complications: No anesthetic complications  PONV Status: none  Cardiovascular status: acceptable  Respiratory status: acceptable  Hydration status: acceptable    Comments: Patient seen and examined postoperatively; vital signs stable; SpO2 greater than or equal to 90%; cardiopulmonary status stable; nausea/vomiting adequately controlled; pain adequately controlled; no apparent anesthesia complications; patient discharged from anesthesia care when discharge criteria were met

## 2021-06-15 NOTE — PROGRESS NOTES
Exercise Oximetry    Patient Name:Tahira Zimmerman   MRN: [id number]   Date: 06/15/21             ROOM AIR BASELINE   SpO2% 94   Heart Rate 103   Blood Pressure      EXERCISE ON ROOM AIR SpO2% EXERCISE ON O2 @ 6 LPM SpO2%   1 MINUTE 90 1 MINUTE 2L   2 MINUTES 84 2 MINUTES 2L   3 MINUTES 89 3 MINUTES 3L   4 MINUTES 85 4 MINUTES 4L   5 MINUTES 90 5 MINUTES 6L   6 MINUTES 90 6 MINUTES 6L              Distance Walked  6 minutes  Distance Walked   Dyspnea (Zaid Scale)  6 Dyspnea (Zaid Scale)   Fatigue (Zaid Scale)  6 Fatigue (Zaid Scale)   SpO2% Post Exercise  92  SpO2% Post Exercise   HR Post Exercise  98 HR Post Exercise   Time to Recovery  About 2 minutes  Time to Recovery     Comments: Patient needs 6LPM of oxygen to maintain saturation during walk. Patient's oxygen drops quickly during activities but once taking a pause to catch breath oxygen will return to normal quickly. Once at rest patient is not needing oxygen to maintain saturation only with activity is patient requiring 6LPM to maintain sat of 90%.      Nato Case, RRT

## 2021-06-15 NOTE — CONSULTS
Hematology/Oncology Inpatient Consultation    Patient name: Tahira Zimmerman  : 1955  MRN: [id number]  Referring Provider: Dr Hartley  Reason for Consultation: Metastatic ovarian cancer    Chief complaint: Chest pain    History of present illness:    Tahira Zimmerman is a 65 y.o. female who presented to [facility] on 2021 with complaints of 10 of 10 chest pain that radiated to the right chest wall, and began that evening while doing laundry.  She denies any aggravating or alleviating factors.  Patient admitted to cough, dyspnea, congestion, hoarseness, weakness, joint pain.  The patient denied fever, chills, nausea, vomiting, palpitations.  Since her dyspnea did not improve she decided to go to the hospital.  Upon arrival to the ED, blood pressure 120/81, heart rate 54, respiratory rate 18, O2 sats 100% on room air.  Troponin less than 0.010, EKG revealed sinus bradycardia rate 53.  Chest x-ray revealed left subclavian Port-A-Cath in place, heart size at upper limits of normal, ill-defined right upper lobe airspace disease suspicious for pneumonia, hazy bibasilar airspace disease similar to the prior study likely due to chronic lung disease.  CT for PE protocol revealed internal progression of extensive osseous metastatic disease.  Findings are consistent with progression of metastatic neoplasm.  Worsening asymmetric interlobular septal thickening throughout the right lung with associated reticulonodular and miliary type nodular airspace disease. 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  Similar findings are identified in the left lower lobe.  Mediastinal and subcarinal adenopathy.  There are several areas of mucous plugging involving multiple bronchioles to the right lower lobe.  The patient has a past medical history of ovarian cancer with metastasis to the bone, brain, and lung currently on oral Etoposide chemotherapy.     She received multiple lines of  chemotherapy treatments with Taxol/Carboplatin (10/10/13-2/20/14, 7 cycles) after initial surgery to remove the cancer, Taxotere/Carboplatin (8/16/16- 9/23/16, 3 cycles), Carboplatin/Doxil (9/23/16-12/1/16), Gemcitabine (12/22/16-3/26/18), Zejula (9/2018-11/7/18), Carboplatin/Doxil (1/7/19-2/21/20, 14 cycles), Carboplatin only (4/17/20-5/15/20), Topotecan 8/6/20-11/13/20), Alimta (12/30/20), Cisplatin/Gemzar (3/12/21-4/30/21).  She is currently on oral etoposide.        06/14/21  Hematology/Oncology was consulted for management of ovarian cancer with oral Etoposide chemotherapy.    He/She  has a past medical history of Anemia (2013), Cervical disc disorder (2013), Clotting disorder (CMS/HCC) (2013), Colon polyp (2013), Deep vein thrombosis (CMS/HCC) (2013), Diverticulosis (2013), GERD (gastroesophageal reflux disease) (2016), HL (hearing loss) (2016), Hyperlipidemia (2013), Hypertension, Joint pain (2013), Low back pain (2013), Neuromuscular disorder (CMS/HCC) (2015), Osteopenia (2014), Osteoporosis, Ovarian cancer (CMS/HCC), Ovarian cancer on left (CMS/HCC) (1995), Pneumonia (2010), Spinal stenosis (2013), and Urinary tract infection (2013).    PCP: Noemy Queen MD    History:  Past Medical History:   Diagnosis Date   • Anemia 2013   • Cervical disc disorder 2013    Herniated disc, pinched nerve   • Clotting disorder (CMS/HCC) 2013    Low platelets from chemo   • Colon polyp 2013   • Deep vein thrombosis (CMS/HCC) 2013   • Diverticulosis 2013   • GERD (gastroesophageal reflux disease) 2016   • HL (hearing loss) 2016    I need hearing aids   • Hyperlipidemia 2013    Need my cholesterol rechecked   • Hypertension    • Joint pain 2013    Shoulder pain, torn rotator cuff   • Low back pain 2013   • Neuromuscular disorder (CMS/HCC) 2015    Shingles, pinched nerves in my neck   • Osteopenia 2014   • Osteoporosis    • Ovarian cancer (CMS/HCC)     ovarian--  spread to lungs 10/2020   • Ovarian cancer on left (CMS/HCC)  1995   • Pneumonia 2010    Have had it several times   • Spinal stenosis 2013    Cervical   • Urinary tract infection 2013    Have had several utis   ,   Past Surgical History:   Procedure Laterality Date   • BRONCHOSCOPY N/A 2/10/2021    Procedure: BRONCHOSCOPY with bronchioalveolar lavage;  Surgeon: Jerica Esparza MD;  Location: [facility] ENDOSCOPY;  Service: Pulmonary;  Laterality: N/A;  post op:right lobe pneumonia   • CATARACT EXTRACTION     • COLON SURGERY     • COLONOSCOPY  05/26/2018   • EXPLORATORY LAPAROTOMY     • HERNIA REPAIR     • HYSTERECTOMY     • OOPHORECTOMY     ,   Family History   Problem Relation Age of Onset   • Hypertension Mother    • Cancer Father    • Hypertension Father    • Hypertension Sister    • Hypertension Brother    • Stroke Brother    ,   Social History     Tobacco Use   • Smoking status: Never Smoker   • Smokeless tobacco: Never Used   Vaping Use   • Vaping Use: Never used   Substance Use Topics   • Alcohol use: No     Comment: caffeine 32-64oz qd   • Drug use: No   ,   Facility-Administered Medications Prior to Admission   Medication Dose Route Frequency Provider Last Rate Last Admin   • theophylline (PEG-24) 24 hr capsule 300 mg  300 mg Oral Q24H Jerica Esparza MD         Medications Prior to Admission   Medication Sig Dispense Refill Last Dose   • acetaminophen (TYLENOL) 500 MG tablet Take 1,000 mg by mouth Every 6 (Six) Hours As Needed for Mild Pain .      • albuterol sulfate  (90 Base) MCG/ACT inhaler Inhale 2 puffs Every 4 (Four) Hours As Needed for Wheezing. 18 g 0    • atorvastatin (LIPITOR) 20 MG tablet Take 20 mg by mouth every night at bedtime.  3    • calcium carbonate (Calcium 600) 600 MG tablet Take 1 tablet by mouth 2 (two) times a day. 60 tablet 11    • cetirizine (zyrTEC) 10 MG tablet Take 1 tablet by mouth Every Night. 30 tablet 0    • cyanocobalamin 1000 MCG/ML injection Inject 1,000 mcg into the appropriate muscle as directed by prescriber Every 28 (Twenty-Eight)  Days.      • dexamethasone (DECADRON) 4 MG tablet Take 2 tablets in the morning daily on days 2, 3 & 4.  Take with food. 6 tablet 5    • etoposide (TOPOSAR;VEPESID) 50 MG chemo capsule Take 1 capsule (50mg) by mouth every other day, alternate with 2 caps (100mg) every other day for 21 days, off 7 days.  Swallow whole. Refrigerate. 31 capsule 2    • famotidine (PEPCID) 40 MG tablet TAKE 1 TABLET BY MOUTH EVERY MORNING BEFORE BREAKFAST 90 tablet 1    • folic acid (FOLVITE) 1 MG tablet Take 1,000 mcg by mouth Daily.      • HYDROcodone-homatropine (HYCODAN) 5-1.5 MG/5ML syrup Take 5 mL by mouth 2 (Two) Times a Day As Needed for Cough. 300 mL 0    • MAGnesium-Oxide 400 (241.3 Mg) MG tablet tablet Take 400 mg by mouth 2 (Two) Times a Day.      • montelukast (SINGULAIR) 10 MG tablet TAKE 1 TABLET BY MOUTH EVERY NIGHT 30 tablet 2    • ondansetron (ZOFRAN) 8 MG tablet Take 1 tablet by mouth 3 (Three) Times a Day As Needed for Nausea or Vomiting. 30 tablet 5    • oxyCODONE (ROXICODONE) 10 MG tablet Take 1 tablet by mouth Every 4 (Four) Hours As Needed for Moderate Pain . 180 tablet 0    • potassium chloride (K-DUR,KLOR-CON) 20 MEQ CR tablet Take 2 tablets by mouth Daily. 60 tablet 3    • pregabalin (LYRICA) 100 MG capsule Take 1 capsule by mouth 3 (Three) Times a Day. 90 capsule 2    • sertraline (ZOLOFT) 50 MG tablet Take 50 mg by mouth Every Night.      • temazepam (RESTORIL) 30 MG capsule TAKE 1 CAPSULE BY MOUTH AT NIGHT AS NEEDED FOR SLEEP 30 capsule 1    • triamterene-hydrochlorothiazide (DYAZIDE) 37.5-25 MG per capsule TAKE 1 CAPSULE BY MOUTH EVERY DAY 90 capsule 1    • warfarin (COUMADIN) 1 MG tablet Take 1 tablet by mouth Daily. At 1700 as directed 30 tablet 3    , Scheduled Meds:  acetylcysteine, 2 mL, Nebulization, BID - RT  atorvastatin, 20 mg, Oral, Daily  calcium 500 mg vitamin D 5 mcg (200 UT), 1 tablet, Oral, BID  cetirizine, 10 mg, Oral, Nightly  folic acid, 1,000 mcg, Oral, Daily  ipratropium-albuterol, 3 mL,  Nebulization, Q4H - RT  magnesium oxide, 400 mg, Oral, BID  methylPREDNISolone sodium succinate, 40 mg, Intravenous, Q8H  montelukast, 10 mg, Oral, Nightly  pantoprazole, 40 mg, Oral, BID AC  potassium chloride, 40 mEq, Oral, Daily  pregabalin, 100 mg, Oral, TID  sertraline, 50 mg, Oral, Nightly  sodium chloride, 10 mL, Intravenous, Q12H  theophylline, 300 mg, Oral, Q24H  triamterene-hydrochlorothiazide, 1 tablet, Oral, Daily    , Continuous Infusions:  Pharmacy to dose warfarin,   sodium chloride, 100 mL/hr, Last Rate: 100 mL/hr (06/14/21 0045)    , PRN Meds:  •  acetaminophen **OR** acetaminophen **OR** acetaminophen  •  albuterol  •  aluminum-magnesium hydroxide-simethicone  •  HYDROcodone-homatropine  •  magnesium sulfate **OR** magnesium sulfate in D5W 1g/100mL (PREMIX)  •  melatonin  •  nitroglycerin  •  ondansetron **OR** ondansetron  •  oxyCODONE  •  Pharmacy to dose warfarin  •  potassium chloride  •  sodium chloride  •  [COMPLETED] Insert peripheral IV **AND** sodium chloride  •  temazepam   Allergies:  Morphine, Penicillin v potassium, Sulfa antibiotics, Levaquin [levofloxacin], and Amoxicillin    Subjective     ROS:  Review of Systems   Constitutional: Negative for chills and fever.   HENT: Negative for ear pain, mouth sores, nosebleeds and sore throat.    Eyes: Negative for photophobia and visual disturbance.   Respiratory: Negative for wheezing and stridor.    Cardiovascular: Negative for chest pain and palpitations.   Gastrointestinal: Negative for abdominal pain, diarrhea, nausea and vomiting.   Endocrine: Negative for cold intolerance and heat intolerance.   Genitourinary: Negative for dysuria and hematuria.   Musculoskeletal: Negative for joint swelling and neck stiffness.   Skin: Negative for color change and rash.   Neurological: Negative for seizures and syncope.   Hematological: Negative for adenopathy.        No obvious bleeding   Psychiatric/Behavioral: Negative for agitation, confusion and  "hallucinations.        Objective   Vital Signs:   /59 (BP Location: Left arm, Patient Position: Lying)   Pulse 61   Temp 97.7 °F (36.5 °C) (Oral)   Resp 18   Ht 165.1 cm (65\")   Wt 80.2 kg (176 lb 14.4 oz)   LMP  (LMP Unknown)   SpO2 96%   BMI 29.44 kg/m²     Physical Exam: (performed by MD)  Physical Exam  Vitals and nursing note reviewed.   Constitutional:       General: She is not in acute distress.     Appearance: She is not diaphoretic.   HENT:      Head: Normocephalic and atraumatic.   Eyes:      General: No scleral icterus.        Right eye: No discharge.         Left eye: No discharge.      Conjunctiva/sclera: Conjunctivae normal.   Neck:      Thyroid: No thyromegaly.   Cardiovascular:      Rate and Rhythm: Normal rate and regular rhythm.      Heart sounds: Normal heart sounds. No friction rub. No gallop.    Pulmonary:      Effort: Pulmonary effort is normal. No respiratory distress.      Breath sounds: No stridor. No wheezing.   Abdominal:      General: Bowel sounds are normal.      Palpations: Abdomen is soft. There is no mass.      Tenderness: There is no abdominal tenderness. There is no guarding or rebound.   Musculoskeletal:         General: No tenderness. Normal range of motion.      Cervical back: Normal range of motion and neck supple.   Lymphadenopathy:      Cervical: No cervical adenopathy.   Skin:     General: Skin is warm.      Findings: No erythema or rash.   Neurological:      Mental Status: She is alert and oriented to person, place, and time.      Motor: No abnormal muscle tone.   Psychiatric:         Behavior: Behavior normal.         Results Review:  Lab Results (last 48 hours)     Procedure Component Value Units Date/Time    COVID PRE-OP / PRE-PROCEDURE SCREENING ORDER (NO ISOLATION) - Swab, Nasopharynx [[id number]]  (Normal) Collected: 06/13/21 2332    Specimen: Swab from Nasopharynx Updated: 06/14/21 5999    Narrative:      The following orders were created for panel order " COVID PRE-OP / PRE-PROCEDURE SCREENING ORDER (NO ISOLATION) - Swab, Nasopharynx.  Procedure                               Abnormality         Status                     ---------                               -----------         ------                     COVID-19,APTIMA PANTHER,...[[id number]]  Normal              Final result                 Please view results for these tests on the individual orders.    COVID-19,APTIMA PANTHER,[facility] IN-HOUSE, NP/OP SWAB IN UTM/VTM/SALINE TRANSPORT MEDIA,24 HR TAT - Swab, Nasopharynx [[id number]]  (Normal) Collected: 06/13/21 2332    Specimen: Swab from Nasopharynx Updated: 06/14/21 1508     COVID19 Not Detected    Narrative:      Fact sheet for providers: https://www.fda.gov/media/550624/download     Fact sheet for patients: https://www.fda.gov/media/495828/download    Test performed by RT PCR.    Protime-INR [[id number]]  (Abnormal) Collected: 06/14/21 1410    Specimen: Blood Updated: 06/14/21 1455     Protime 16.3 Seconds      INR 1.51    Troponin [[id number]]  (Normal) Collected: 06/14/21 0557    Specimen: Blood Updated: 06/14/21 0702     Troponin T <0.010 ng/mL     Narrative:      Troponin T Reference Range:  <= 0.03 ng/mL-   Negative for AMI  >0.03 ng/mL-     Abnormal for myocardial necrosis.  Clinicians would have to utilize clinical acumen, EKG, Troponin and serial changes to determine if it is an Acute Myocardial Infarction or myocardial injury due to an underlying chronic condition.       Results may be falsely decreased if patient taking Biotin.      Protime-INR [[id number]]  (Abnormal) Collected: 06/14/21 0410    Specimen: Blood Updated: 06/14/21 0511     Protime 44.1 Seconds      INR 4.30    Basic Metabolic Panel [[id number]]  (Abnormal) Collected: 06/14/21 0410    Specimen: Blood Updated: 06/14/21 0459     Glucose 132 mg/dL      BUN 20 mg/dL      Creatinine 1.06 mg/dL      Sodium 136 mmol/L      Potassium 3.8 mmol/L      Chloride 100 mmol/L      CO2 25.0 mmol/L      Calcium  8.6 mg/dL      eGFR Non African Amer 52 mL/min/1.73      BUN/Creatinine Ratio 18.9     Anion Gap 11.0 mmol/L     Narrative:      GFR Normal >60  Chronic Kidney Disease <60  Kidney Failure <15      Magnesium [[id number]]  (Normal) Collected: 06/14/21 0410    Specimen: Blood Updated: 06/14/21 0459     Magnesium 1.7 mg/dL     CBC Auto Differential [[id number]]  (Abnormal) Collected: 06/14/21 0410    Specimen: Blood Updated: 06/14/21 0436     WBC 3.60 10*3/mm3      RBC 3.36 10*6/mm3      Hemoglobin 9.7 g/dL      Hematocrit 29.3 %      MCV 87.4 fL      MCH 28.8 pg      MCHC 33.0 g/dL      RDW 18.4 %      RDW-SD 56.4 fl      MPV 8.1 fL      Platelets 136 10*3/mm3      Neutrophil % 80.8 %      Lymphocyte % 16.3 %      Monocyte % 0.9 %      Eosinophil % 1.9 %      Basophil % 0.1 %      Neutrophils, Absolute 2.90 10*3/mm3      Lymphocytes, Absolute 0.60 10*3/mm3      Monocytes, Absolute 0.00 10*3/mm3      Eosinophils, Absolute 0.10 10*3/mm3      Basophils, Absolute 0.00 10*3/mm3      nRBC 0.0 /100 WBC     Troponin [[id number]]  (Normal) Collected: 06/14/21 0007    Specimen: Blood Updated: 06/14/21 0038     Troponin T <0.010 ng/mL     Narrative:      Troponin T Reference Range:  <= 0.03 ng/mL-   Negative for AMI  >0.03 ng/mL-     Abnormal for myocardial necrosis.  Clinicians would have to utilize clinical acumen, EKG, Troponin and serial changes to determine if it is an Acute Myocardial Infarction or myocardial injury due to an underlying chronic condition.       Results may be falsely decreased if patient taking Biotin.      Protime-INR [[id number]]  (Abnormal) Collected: 06/14/21 0007    Specimen: Blood Updated: 06/14/21 0036     Protime >90.0 Seconds      INR >8.00    [place] Draw [[id number]] Collected: 06/13/21 1930    Specimen: Blood Updated: 06/13/21 2031    Narrative:      The following orders were created for panel order [place] Draw.  Procedure                               Abnormality         Status                      ---------                               -----------         ------                     Green Top (Gel)[[id number]]                                  Final result               Lavender Top[[id number]]                                     Final result               Gold Top - SST[[id number]]                                   Final result                 Please view results for these tests on the individual orders.    Gold Top - SST [[id number]] Collected: 06/13/21 1930    Specimen: Blood Updated: 06/13/21 2031     Extra Tube Hold for add-ons.     Comment: Auto resulted.       Lavender Top [[id number]] Collected: 06/13/21 1930    Specimen: Blood Updated: 06/13/21 2031     Extra Tube hold for add-on     Comment: Auto resulted       aPTT [[id number]]  (Abnormal) Collected: 06/13/21 1930    Specimen: Blood Updated: 06/13/21 2013     PTT 54.8 seconds     Protime-INR [[id number]]  (Abnormal) Collected: 06/13/21 1930    Specimen: Blood Updated: 06/13/21 2013     Protime 75.3 Seconds      INR 7.54    Green Top (Gel) [[id number]] Collected: 06/13/21 1930    Specimen: Blood Updated: 06/13/21 2001     Extra Tube HOLD    Comprehensive Metabolic Panel [[id number]]  (Abnormal) Collected: 06/13/21 1930    Specimen: Blood Updated: 06/13/21 1956     Glucose 96 mg/dL      BUN 20 mg/dL      Creatinine 1.11 mg/dL      Sodium 135 mmol/L      Potassium 4.4 mmol/L      Chloride 98 mmol/L      CO2 24.0 mmol/L      Calcium 8.9 mg/dL      Total Protein 7.0 g/dL      Albumin 3.70 g/dL      ALT (SGPT) 6 U/L      AST (SGOT) 15 U/L      Alkaline Phosphatase 121 U/L      Total Bilirubin 0.3 mg/dL      eGFR Non African Amer 49 mL/min/1.73      Globulin 3.3 gm/dL      A/G Ratio 1.1 g/dL      BUN/Creatinine Ratio 18.0     Anion Gap 13.0 mmol/L     Narrative:      GFR Normal >60  Chronic Kidney Disease <60  Kidney Failure <15      Troponin [[id number]]  (Normal) Collected: 06/13/21 1930    Specimen: Blood Updated: 06/13/21 1956     Troponin T <0.010 ng/mL      Narrative:      Troponin T Reference Range:  <= 0.03 ng/mL-   Negative for AMI  >0.03 ng/mL-     Abnormal for myocardial necrosis.  Clinicians would have to utilize clinical acumen, EKG, Troponin and serial changes to determine if it is an Acute Myocardial Infarction or myocardial injury due to an underlying chronic condition.       Results may be falsely decreased if patient taking Biotin.      CBC & Differential [[id number]]  (Abnormal) Collected: 06/13/21 1930    Specimen: Blood Updated: 06/13/21 1939    Narrative:      The following orders were created for panel order CBC & Differential.  Procedure                               Abnormality         Status                     ---------                               -----------         ------                     CBC Auto Differential[[id number]]        Abnormal            Final result                 Please view results for these tests on the individual orders.    CBC Auto Differential [[id number]]  (Abnormal) Collected: 06/13/21 1930    Specimen: Blood Updated: 06/13/21 1939     WBC 4.00 10*3/mm3      RBC 3.34 10*6/mm3      Hemoglobin 9.9 g/dL      Hematocrit 29.4 %      MCV 88.1 fL      MCH 29.6 pg      MCHC 33.5 g/dL      RDW 19.0 %      RDW-SD 58.2 fl      MPV 8.7 fL      Platelets 172 10*3/mm3      Neutrophil % 74.9 %      Lymphocyte % 20.2 %      Monocyte % 1.0 %      Eosinophil % 3.6 %      Basophil % 0.3 %      Neutrophils, Absolute 3.00 10*3/mm3      Lymphocytes, Absolute 0.80 10*3/mm3      Monocytes, Absolute 0.00 10*3/mm3      Eosinophils, Absolute 0.10 10*3/mm3      Basophils, Absolute 0.00 10*3/mm3      nRBC 0.0 /100 WBC            Pending Results:     Imaging Reviewed:   XR Chest 1 View    Result Date: 6/13/2021    1.  New hazy right upper lobe airspace disease suspicious for pneumonia. 2.  No change in hazy bibasilar airspace disease likely due to chronic lung disease.   Electronically Signed By-Reid Nelson MD On:6/13/2021 8:11 PM This report was  finalized on [id number] by  Reid Nelson MD.    CT Chest Pulmonary Embolism    Result Date: 6/13/2021  1. Interval progression of extensive osseous metastatic disease. Findings are consistent with progression of metastatic neoplasm. 2. Worsening asymmetric interlobular septal thickening throughout the right lung with associated reticulonodular and miliary type nodular airspace disease. 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 This has worsened. Similar findings are identified in the left lower lobe as well. 3. Mediastinal and subcarinal adenopathy. This is felt to be metastatic and is essentially stable given differences in technique. 4. Bronchial wall thickening. There are subtle areas of mucous plugging involving multiple bronchioles to the right lower lobe. Correlate for bronchiolitis, reactive airways lung disease or possibly aspiration. 5. Small hiatal hernia. 6. No pulmonary embolus. Slot 63 Electronically signed by:  Reyes Agarwal M.D.  6/13/2021 6:50 PM           Assessment/Plan   ASSESSMENT  Recurrent Ovarian cancer with metastases to bone/brain/colon/Lung: Patient established care in June 2013 with history of adenocarcinoma of ovary diagnosed 10/31/1995. Six months prior to establishment of care in June 2013, the patient felt mass in abdomen and had rectal bleeding. CT guided biopsy 6/27/13 revealed metastatic papillary adenocarcinoma consistent with ovarian cancer.  She is currently taking oral Etoposide daily.   MRI (12/15/20) showed progressive brain mets. CT chest (5/6/21) showed progression of disease to lungs.  Currently on oral etoposide.  CT of the chest shows clear progression.  Discussed options of care including hospice palliative care, consideration of additional chemotherapy with  Doxil plus or minus platinum.  Patient had responded to Doxil but she was nearing the maximum cardiac tolerated dose therefore treatment was discontinued.     2. HX DVT: Left upper  extremity diagnosed 10/2013. Lovenox started 10/13/13 to bridge until Coumadin 5 mg therapeutic. Currently followed with PT/INR to keep Coumadin therapeutic level. INR 4.3 today.  Will check daily PT/INR     3. Anemia: Diagnosed with pernicious anemia in 3/2016. Received Procrit, ferrous sulfate, Injectafer in past. Currently receiving parenteral B12 injections. Hgb 9.7 today.  Continue B12 injections in the outpatient setting     4. Supratherapeutic INR: Received Vitamin K in ER for INR of 7.54.      5. Dyspnea: Likely secondary to lymphangitic spread of malignancy.  No evidence of PE on her recent CT chest.  Pulmonology following.           PLANS  1. Consult palliative care for progressive disease.  Reviewed with patient.  She still wants to consider palliative chemotherapy.  Lately she has progressed through multiple lines of treatment.  2. Monitor INR and adjust Coumadin as needed per protocol  3. Plan bronchoscopy when INR below 2  4. Monitor CBC, CMP  5. Hold Coumadin  6. Monitor for bleeding     Electronically signed by IRENA Gomez, 06/14/21, 12:11 PM EDT.        Thank you for this consult. We will be happy to follow along with you.      Patient seen and examined.  Face-to-face evaluation completed.  Note reviewed and edited.  Discussed with nurse practitioner.  Agree with assessment and plan as documented above.  Patient has clearly progressive disease in the lungs.  Agree with hospice palliative care.  She is still interested in chemotherapy.  If so could consider Doxil plus or minus platinum will be considered.  Monitor INR.  No evidence of bleeding.  Hold etoposide due to progressive disease.  Discussed with patient.        Electronically signed by Cindy Ball MD, 06/15/21, 7:22 AM EDT.        Thank you for this consult. We will be happy to follow along with you.

## 2021-06-15 NOTE — OUTREACH NOTE
Prep Survey      Responses   [facility] patient discharged from?  [facility]   Is LACE score < 7 ?  No   Emergency Room discharge w/ pulse ox?  No   Eligibility  [facility]   Date of Admission  06/13/21   Date of Discharge  06/15/21   Discharge Disposition  Home or Self Care   Discharge diagnosis  Chest pain   Does the patient have one of the following disease processes/diagnoses(primary or secondary)?  Other   Does the patient have Home health ordered?  Yes   What is the Home health agency?   [facility]   Is there a DME ordered?  Yes   What DME was ordered?  [facility] - home O2 and nebulizer   General alerts for this patient  Metastatic ovarian cancer   Prep survey completed?  Yes          Kathleen Delong RN

## 2021-06-16 NOTE — CASE MANAGEMENT/SOCIAL WORK
Case Management Discharge Note      Final Note: [facility] accepted, [facility] home O2 and nebulizer         Selected Continued Care - Discharged on 6/15/2021 Admission date: 6/13/2021 - Discharge disposition: Home or Self Care    Destination    No services have been selected for the patient.              Durable Medical Equipment Coordination complete    Service Provider Selected Services Address Phone Fax Patient Preferred    [facility]  Durable Medical Equipment [address], [place] [id number] [phone] --          Dialysis/Infusion    No services have been selected for the patient.              Home Medical Care Coordination complete    Service Provider Selected Services Address Phone Fax Patient Preferred    [facility]  Home Health Services [address] [facility] [address] [phone] --                       Final Discharge Disposition Code: 06 - home with home health care

## 2021-06-16 NOTE — OUTREACH NOTE
Call Center TCM Note      Responses   [facility] patient discharged from?  [facility]   Does the patient have one of the following disease processes/diagnoses(primary or secondary)?  Other   TCM attempt successful?  No   Unsuccessful attempts  Attempt 1          Amada Romero MA    6/16/2021, 11:43 EDT

## 2021-06-16 NOTE — OUTREACH NOTE
Call Center TCM Note      Responses   [facility] patient discharged from?  [facility]   Does the patient have one of the following disease processes/diagnoses(primary or secondary)?  Other   TCM attempt successful?  No   Unsuccessful attempts  Attempt 2          Amada Romero MA    6/16/2021, 16:05 EDT

## 2021-06-16 NOTE — OP NOTE
Bronchoscopy Procedure Note    Procedure:  1. Bronchoscopy, Therapeutic  2. Bronchoalveolar lavage, BAL    Pre-Operative Diagnosis:   Pneumonia lung cancer    Post-Operative Diagnosis:    mucopurulent secretions were suctioned therapeutically  Diffuse endobronchial nodular densities  Suspicious for metastatic malignancy versus infectious etiology    Anesthesia: Moderate Sedation    Procedure Details: Patient was consented for the procedure with all risk and benefit of the procedure explained in detail.  Patient was given the opportunity to ask questions and all concerns were answered.  The bronchocope was inserted into the main airway via the oropharynx. An anatomical survey was done of the main airways and the subsegmental bronchus to at least the first subsegmental level of all five lobes of both lungs.  The findings are reported below.  A bronchoalveolar lavage was performed using aliquots of normal saline instilled into the airways then aspirated back.    Findings:  Bronchoscope passed into oral cavity to the level of the vocal cords.  Lidocaine used for local anesthetic over vocal cords.  Bronchoscope was passed between the vocal cords into the trachea.  All airways were visualized to at least the first subsegment level of all 5 lobes of both lungs.       mucopurulent secretions were suctioned therapeutically  Diffuse endobronchial nodular densities  Suspicious for metastatic malignancy versus infectious etiology        Patient tolerated procedure well        Estimated Blood Loss:  Minimal           Specimens:  Sent serosanguinous fluid                Complications:  None; patient tolerated the procedure well.           Disposition: PACU - hemodynamically stable.      Patient tolerated the procedure well.    Jerica Esparza MD  6/16/2021  00:11 EDT

## 2021-06-17 NOTE — OUTREACH NOTE
Call Center TCM Note      Responses   [facility] patient discharged from?  [facility]   Does the patient have one of the following disease processes/diagnoses(primary or secondary)?  Other   TCM attempt successful?  No [Pati-daughter ]   Unsuccessful attempts  Attempt 3          Chelsea Hess RN    6/17/2021, 09:10 EDT

## 2021-06-18 NOTE — TELEPHONE ENCOUNTER
LVM for patient that she has an MRI 6/18/2021 at [facility] so that we can give her results on her 6/21/2021 appointment.

## 2021-06-21 NOTE — PROGRESS NOTES
FOLLOW-UP NOTE    Name: Tahira Zimmerman  YOB: 1955  MRN #: [id number]  Date of Service: 6/21/2021  Primary Care Provider: Noemy Queen MD    DIAGNOSIS: Stage IV Ovarian  1. Brain metastases (CMS/HCC)      REASON FOR VISIT: Ovarian cancer (brain mets) follow-up    HISTORY OF PRESENT ILLNESS: The patient is a 65 y.o. year old female who completed 27 Gy in 3 fractions on 10/28/2020 for an enlarging left parietal cortex lesion ([id number]) and a left superior cerebellar lesion ([id number] mm).      She was seen by my colleague, Dr. Ortega, on 3/11/2021 following an MRI brain on 03/10/2021 showing no progression.  Over the interval, she denies any CNS symptoms and notes no further headaches.    Unfortunately, she has had recent progression on chemo and recently hospitalization.    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  Also noted was mediastinal and subcarinal adenopathy.    She has met with Dr. Ball to discuss next steps vs. Hospice.    Her chemo to date: Taxol/Carboplatin (10/10/13-2/20/14, 7 cycles) after initial surgery to remove the cancer, Taxotere/Carboplatin (8/16/16- 9/23/16, 3 cycles), Carboplatin/Doxil (9/23/16-12/1/16), Gemcitabine (12/22/16-3/26/18), Zejula (9/2018-11/7/18), Carboplatin/Doxil (1/7/19-2/21/20, 14 cycles), Carboplatin only (4/17/20-5/15/20), Topotecan 8/6/20-11/13/20), Alimta (12/30/20), Cisplatin/Gemzar (3/12/21-4/30/21).  She is currently on oral etoposide.    She did not get interval MRI brain that was due before our visit.    The following portions of the patient's history were reviewed and updated as appropriate: allergies, current medications,  past family history, past medical history, past social history, past surgical history and problem list. Reviewed with the patient and remain unchanged.    PAST MEDICAL HISTORY:  she  has a past medical history of Anemia (2013), Cervical disc disorder (2013), Clotting disorder (CMS/[facility]) (2013), Colon polyp (2013), Deep vein thrombosis (CMS/[facility]) (2013), Diverticulosis (2013), GERD (gastroesophageal reflux disease) (2016), HL (hearing loss) (2016), Hyperlipidemia (2013), Hypertension, Joint pain (2013), Low back pain (2013), Neuromuscular disorder (CMS/[facility]) (2015), Osteopenia (2014), Osteoporosis, Ovarian cancer (CMS/[facility]), Ovarian cancer on left (CMS/[facility]) (1995), Pneumonia (2010), Spinal stenosis (2013), and Urinary tract infection (2013).  MEDICATIONS:   Current Outpatient Medications:   •  acetaminophen (TYLENOL) 500 MG tablet, Take 1,000 mg by mouth Every 6 (Six) Hours As Needed for Mild Pain ., Disp: , Rfl:   •  albuterol sulfate  (90 Base) MCG/ACT inhaler, Inhale 2 puffs Every 4 (Four) Hours As Needed for Wheezing., Disp: 18 g, Rfl: 0  •  atorvastatin (LIPITOR) 20 MG tablet, Take 20 mg by mouth every night at bedtime., Disp: , Rfl: 3  •  calcium carbonate (Calcium 600) 600 MG tablet, Take 1 tablet by mouth 2 (two) times a day., Disp: 60 tablet, Rfl: 11  •  cetirizine (zyrTEC) 10 MG tablet, Take 1 tablet by mouth Every Night., Disp: 30 tablet, Rfl: 0  •  cyanocobalamin 1000 MCG/ML injection, Inject 1,000 mcg into the appropriate muscle as directed by prescriber Every 28 (Twenty-Eight) Days., Disp: , Rfl:   •  dexamethasone (DECADRON) 4 MG tablet, Take 2 tablets in the morning daily on days 2, 3 & 4.  Take with food., Disp: 6 tablet, Rfl: 5  •  etoposide (TOPOSAR;VEPESID) 50 MG chemo capsule, Take 1 capsule (50mg) by mouth every other day, alternate with 2 caps (100mg) every other day for 21 days, off 7 days.  Swallow whole. Refrigerate., Disp: 31 capsule, Rfl: 2  •  famotidine (PEPCID) 40 MG tablet, Take 1  tablet by mouth Every Morning Before Breakfast., Disp: 90 tablet, Rfl: 1  •  folic acid (FOLVITE) 1 MG tablet, Take 1,000 mcg by mouth Daily., Disp: , Rfl:   •  HYDROcodone-homatropine (HYCODAN) 5-1.5 MG/5ML syrup, Take 5 mL by mouth 2 (Two) Times a Day As Needed for Cough., Disp: 300 mL, Rfl: 0  •  MAGnesium-Oxide 400 (241.3 Mg) MG tablet tablet, Take 400 mg by mouth 2 (Two) Times a Day., Disp: , Rfl:   •  montelukast (SINGULAIR) 10 MG tablet, TAKE 1 TABLET BY MOUTH EVERY NIGHT, Disp: 30 tablet, Rfl: 2  •  ondansetron (ZOFRAN) 8 MG tablet, Take 1 tablet by mouth 3 (Three) Times a Day As Needed for Nausea or Vomiting., Disp: 30 tablet, Rfl: 5  •  oxyCODONE (ROXICODONE) 10 MG tablet, Take 1 tablet by mouth Every 4 (Four) Hours As Needed for Moderate Pain ., Disp: 180 tablet, Rfl: 0  •  potassium chloride (K-DUR,KLOR-CON) 20 MEQ CR tablet, Take 2 tablets by mouth 3 (Three) Times a Day., Disp: 120 tablet, Rfl: 3  •  pregabalin (LYRICA) 100 MG capsule, Take 1 capsule by mouth 3 (Three) Times a Day., Disp: 90 capsule, Rfl: 2  •  sertraline (ZOLOFT) 50 MG tablet, Take 50 mg by mouth Every Night., Disp: , Rfl:   •  temazepam (RESTORIL) 30 MG capsule, TAKE 1 CAPSULE BY MOUTH AT NIGHT AS NEEDED FOR SLEEP, Disp: 30 capsule, Rfl: 1  •  triamterene-hydrochlorothiazide (DYAZIDE) 37.5-25 MG per capsule, TAKE 1 CAPSULE BY MOUTH EVERY DAY, Disp: 90 capsule, Rfl: 1  •  warfarin (COUMADIN) 1 MG tablet, Take 1 tablet by mouth Daily. At 1700 as directed, Disp: 30 tablet, Rfl: 3    Current Facility-Administered Medications:   •  theophylline (PEG-24) 24 hr capsule 300 mg, 300 mg, Oral, Q24H, DrawJerica MD  ALLERGIES:   Allergies   Allergen Reactions   • Morphine Nausea Only and Hives   • Penicillin V Potassium Rash   • Sulfa Antibiotics Rash   • Levaquin [Levofloxacin] Nausea Only and Dizziness     When taken with Coumadin   • Amoxicillin Rash     PAST SURGICAL HISTORY: she has a past surgical history that includes Hysterectomy;  "Hernia repair; Colon surgery; Colonoscopy (05/26/2018); Exploratory laparotomy; Oophorectomy; Cataract extraction; Bronchoscopy (N/A, 2/10/2021); and Bronchoscopy (N/A, 6/15/2021).  PREVIOUS RADIOTHERAPY OR CHEMOTHERAPY: yes  FAMILY HISTORY: her family history includes Cancer in her father; Hypertension in her brother, father, mother, and sister; Stroke in her brother.  SOCIAL HISTORY: she  reports that she has never smoked. She has never used smokeless tobacco. She reports that she does not drink alcohol and does not use drugs.  PAIN AND PAIN MANAGEMENT: denies pain.  Vitals:    06/21/21 0827   BP: 123/81   Pulse: 85   Temp: 97.5 °F (36.4 °C)   SpO2: 97%   Weight: 76.7 kg (169 lb)   Height: 165.1 cm (65\")   PainSc: 0-No pain     NUTRITIONAL STATUS:     no issues  KPS: 70      Review of Systems:   General: No fevers, chills, weight change, or drenching night sweats. Skin: No rashes or jaundice.  HEENT: No change in vision or hearing, no headaches.  Neck: No dysphagia or masses.  Heme/Lymph: No easy bruising or bleeding.  Respiratory System: as noted above.  Cardiovascular: No chest pain, palpitations, or dyspnea on exertion.  - Pacemaker. GI: No nausea, vomiting, diarrhea, melena, or hematochezia.  : No dysuria or hematuria.  Endocrine: No heat or cold intolerance. Musculoskeletal: No myalgias or arthralgias.  Neuro: No new weakness, numbness, syncope, or seizures. Psych: No mood changes or depression. Ext: Denies swelling.        Objective     Vitals:  Vitals:    06/21/21 0827   BP: 123/81   Pulse: 85   Temp: 97.5 °F (36.4 °C)   SpO2: 97%   Weight: 76.7 kg (169 lb)   Height: 165.1 cm (65\")   PainSc: 0-No pain       PHYSICAL EXAM:  GENERAL: in no apparent distress, sitting comfortably in room.    CARDIOVASCULAR: S1 & S2 detected; no murmurs, rubs or gallops.  MUSCULOSKELETAL: no tenderness to palpation along the spine or scapulae. Normal range of motion.  EXTREMITIES: no clubbing, cyanosis, edema.  SKIN: no " erythema, rashes, ulcerations noted.   NEUROLOGIC: cranial nerves II-XII grossly intact bilaterally. No focal neurologic deficits.  PSYCHIATRIC:  alert, aware, and appropriate.      PERTINENT IMAGING/PATHOLOGY/LABS (Medical Decision Making):     COORDINATION OF CARE: A copy of this note is sent to the referring provider.    PATHOLOGY (Reviewed):     IMAGING (Reviewed): AS noted above    LABS (Reviewed):  Hematology WBC   Date Value Ref Range Status   06/15/2021 4.80 3.40 - 10.80 10*3/mm3 Final   07/05/2020 4.04 (L) 4.5 - 11.0 10*3/uL Final     RBC   Date Value Ref Range Status   06/15/2021 2.98 (L) 3.77 - 5.28 10*6/mm3 Final   07/05/2020 3.68 (L) 4.0 - 5.2 10*6/uL Final     Hemoglobin   Date Value Ref Range Status   06/15/2021 8.7 (L) 12.0 - 15.9 g/dL Final   07/05/2020 11.5 (L) 12.0 - 16.0 g/dL Final     Hematocrit   Date Value Ref Range Status   06/15/2021 26.6 (L) 34.0 - 46.6 % Final   07/05/2020 35.3 (L) 36.0 - 46.0 % Final     Platelets   Date Value Ref Range Status   06/15/2021 134 (L) 140 - 450 10*3/mm3 Final   07/05/2020 158 140 - 440 10*3/uL Final      Chemistry   Lab Results   Component Value Date    GLUCOSE 141 (H) 06/15/2021    BUN 21 06/15/2021    CREATININE 0.90 06/15/2021    EGFRIFNONA 63 06/15/2021    EGFRIFAFRI >60 06/07/2019    BCR 23.3 06/15/2021    K 4.0 06/15/2021    CO2 23.0 06/15/2021    CALCIUM 8.4 (L) 06/15/2021    ALBUMIN 3.70 06/13/2021    LABIL2 1.3 07/03/2020    AST 15 06/13/2021    ALT 6 06/13/2021         Assessment/Plan     ASSESSMENT AND PLAN:    1. Brain metastases (CMS/HCC)       -Patient not able to get interval MRI brain due to admission and noted progression on current systemic therapy options.  Her and her family have been considering next steps of more therapy vs. Hospice.  She tells me that she wants to reach out to Dr. Ball for more questions but is leaning towards more systemic therapy.    Our team reached out to coordinate Doxil discussions with Dr. Ball.  Needs MRI  brain performed as ordered by Dr. Ortega. I will facilitate.    This assessment comes from my review of the imaging, pathology, physician notes and other pertinent information as mentioned.    DISPOSITION: Needs MRI brain and interval review.  Progression of disease noted in non-CNS sites but patient without CNS exam findings or symptoms today.        TIME SPENT WITH PATIENT:   I spent 25 minutes caring for Tahira on this date of service. This time includes time spent by me in the following activities: preparing for the visit, reviewing tests, obtaining and/or reviewing a separately obtained history, performing a medically appropriate examination and/or evaluation, counseling and educating the patient/family/caregiver, ordering medications, tests, or procedures, referring and communicating with other health care professionals and documenting information in the medical record.       CC: MD Partha Espinoza MD  6/29/2021  8:47 AM EDT

## 2021-06-22 NOTE — PROGRESS NOTES
Received a message from SAADIA [facility] stating that the pt is interested in pursuing palliative chemo. I relayed this to Dr. Ball and told her that Kathleen already tried to call her to set up a follow-up, but was unable to reach her. Dr. Ball ordered a 2D ECHO in preparation for chemo.

## 2021-06-23 NOTE — OUTREACH NOTE
Medical Week 2 Survey      Responses   [facility] patient discharged from?  [facility]   Does the patient have one of the following disease processes/diagnoses(primary or secondary)?  Other   Week 2 attempt successful?  No   Revoke  Decline to participate          Carlee Yee LPN

## 2021-06-29 NOTE — PROGRESS NOTES
[facility] Note: Etoposide    Etoposide discontinued per discussion with Dr. Ball.  [facility] pharmacy will no longer follow patient.  If oral treatment is appropriate for treatment in the future, please consult [facility]>   Thanks.  Nancy Haro, JuanD

## 2021-06-30 NOTE — TELEPHONE ENCOUNTER
Caller: EDE ROBLERO     Relationship to patient: SELF     Best call back number: [phone]    Chief complaint: SCHEDULING REQUEST     Type of visit: FOLLOW UP 3 MONTH     Requested date: 7/2/21    If rescheduling, when is the original appointment: 7/2/21     Additional notes: PATIENT CALLED STATING THAT SHE IS AN ONCOLOGY PATIENT AND RECENTLY HAD AN OPTICAL SX SO SHE IS REALLY JUST TRYING TO STAY COMFORTABLE AND IN THE HOUSE AS OF NOW. I SPOKE WITH MARYJANE @ OFFICE AND SHE DID INFORM ME THAT THE OFFICE WAS ONLY DOING TELEPHONE/VIRTUAL VISITS IF PATIENTS HAD ACTIVE COVID-19 SYMPTOMS OR WERE RUNNING A FEVER. SHE ASKED ME TO JUST ASK IF AN EXCEPTION COULD BE MADE BUT UNDERSTANDS IF IT CANNOT.    PLEASE ADVISE.

## 2021-07-01 NOTE — PROGRESS NOTES
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 Recurrent ovarian cancer diagnosis established in 2013.  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  The patient was treated with surgery and then postoperatively with adjuvant chemotherapy consisting of carboplatin and Taxol.  Six months later, she had recurrence of disease intra-abdominally between the rectum and vagina, which was treated with intraabdominal peritoneal chemotherapy.  She had been free of disease since that time.  She reported feeling a mass in the left side of her abdomen approximately six months ago.  This gradually grew and in early 2013 she had her daughter feel the mass.  The daughter works for Dr. David Rogers and the patient at that time also complained of intermittent rectal bleeding.  Consultation with Dr. David Rogers was obtained.  The patient had a CT scan of the abdomen and pelvis performed on 13 revealing left lower quadrant mass measuring 9.7 x 9.3 x 6 cm demonstrating areas of dense calcification, soft tissue density and cystic density within it.  Stromal tumor is felt to be most likely a possibly fibroma.  It is generated with necrosis and calcification.  Possible palpable mass in the rectus muscle is seen with calcification and deformity of the rectus muscle and just below this a second mass intra-abdominally was seen measuring 2 cm in the greatest diameter suspicious for second intraabdominal tumor.  The mass separate from the largest mass measuring 2.6 cm in  the dependent portion of pelvis is seen on the right of the midline.  No retroperitoneal lymphadenopathy was seen.  No free air, free fluid or other abnormality was present.  The patient was scheduled for an outpatient colonoscopy, but had syncopal episode while sitting in the toilet the night before and was brought to the emergency room early in the morning by her daughter and son-in-law.  The patient had apparently stopped breathing for a few seconds and did not have a pulse, but started breathing before CPR could be initiated.  In the emergency room, the patient had an EKG revealing sinus rhythm nonspecific T-wave flattening; metabolic panel revealed a glucose of 148, creatinine of 1.3, CBC was normal.  She was treated with intravenous fluid.  The patient’s case was then discussed with Dr. Anabell Monique and patient was subsequently admitted to [facility].  The patient underwent a colonoscopy by Dr. David Rogers on 06/27/13 revealing sigmoid diverticulosis and grade II internal and external hemorrhoids, but no mass or colitis was seen.  CT-guided biopsy of the mass was performed on 06/27/13 as well revealing metastatic papillary adenocarcinoma with numerous psammoma bodies.  Pathology comment stated prior records were not available; however, with history of ovarian cancer the current biopsy would be consistent with metastases from that malignancy.  Oncology consult was obtained and I initially saw the patient on 06/27/2013 where the patient had claimed to have little bit of pain in the area of disease.  She reported being frequently constipated, denies any weight loss or fevers.  She did report having some night sweats, but thought that was because of her menopause.  On 06/27/13 metastatic workup including labs and CT scans were initiated.  CT scan of the chest on 06/27/13 revealed no acute disease in the chest.  A 1.4 cm size focal area of decreased density was noted in the lateral aspect of the  right lobe of the liver consistent with a benign cyst or hemangioma.  There was a very small hiatal hernia measuring 2.2 cm in diameter.  A CT scan of the head performed 06/27/13 revealed no acute intracranial abnormality noted.  CA-125 was 40 units/ml (0-35), CA 19-9 was 11.8 units/ml (0-35), CEA level was 0.8 ng/ml (0-3), TSH level was 1.09 IU/ml (0.34-5.6).  The patient was in workup for the syncopal episode, a carotid Doppler was performed on June 28 revealing an essentially normal study showing a reduction in diameter from 0-15% bilaterally.  A Holter monitor was completed on 06/27/13, which was unremarkable except for a few premature atrial contractions.  The patient was felt stable and subsequently was discharged home on 06/28/13.  Post discharge, the patient was seen at [facility] on 07/05/13 by Dr. Kathryn Thrasher who discussed treatment options with the patient including surgery.  The patient is in the office today 07/16/13 in follow up post hospitalization.  She is reporting that surgery is planned for July 30th of this month at .  · 7/30/13 to 8/3/13 - The patient was in [facility].  The patient was admitted for surgery for her recurrent ovarian cancer.  The patient had exploratory laparotomy, omentectomy, bowel resection, liver biopsy and appendectomy.  Pathology revealed of the omentum metastatic serous carcinoma of the transverse colon, segmental resection showed metastatic serous carcinoma involving mesenteric fat.  The liver wedge resection showed fibrosclerotic thickening of the hepatic capsule.  Benign liver parenchyma.  No evidence of metastatic tumor.  Appendix showed fibrous obliteration of the lumen.  Negative for metastatic carcinoma.  Rectum and sigmoid colon segmental resection showed metastatic serous carcinoma invading the colorectal mucosa uninvolved by tumor.  Vaginal mass showed metastatic serous carcinoma.  Margins are positive for  tumor.  · 9/24/13 - Chemotherapy orders were written for patient to start carboplatin 550 mg IV day one and Taxol 330 mg IV day one to be cycled every three weeks.  · 10/10/13 - The patient started cycle #1 of chemotherapy consisting of Carboplatin and Taxol.  · 09/25/13 -  is 10.6 normal.  · 10/01/13 - CT of the chest, abdomen and pelvis revealed new reticular nodular occupancy in the anterior segment of the right upper lobe, could reflect an inflammation or infectious etiology or could reflect unusual persistence of metastatic tumor.  New liver lesions, the smaller one appears to be implant on the surface of the liver, the larger may also represent an implant including the intrahepatic.  Several small mesenteric nodes seen throughout the abdomen and pelvis.  · 10/02/13 - Port placed by Dr. Sen.  · 10/24/13 - Orders written to start Neupogen daily and if more than three Neupogen are needed to give Neulasta after next chemo.  ANC is 0.15.  · 10/31/14 - Patient given cycle 2 of chemotherapy with Taxol and Carboplatin.  · 11/21/13 - The patient started cycle 3 of chemotherapy consisting of Carboplatin and Taxol.  · 11/26/13 - CT scan of chest, abdomen and pelvis revealed no evidence of active disease in the chest.  Reticulonodular opacity has resolved.  Metastatic lesion impressing upon the hepatic dome similar to prior exam.  No evidence of a change.  No evidence of new disease.  Postsurgical changes in the pelvis and throughout the retroperitoneum in the large bowel.  Finding containing anterior abdominal wall hernia containing fat tissue and enhancing vessels, 3 cm in diameter.  · 12/2/13 - Orders written to start Neupogen per protocol as well as Aranesp due to low hemoglobin and ANC.  ANC is 0.14.  Hemoglobin is 9.7.  · 12/11/13 - Orders written to hold chemotherapy until next week and decrease dose by 20% due to low platelet count.  Platelet count is 85,000.  · 12/16/13 - The patient received cycle 4 of  Carboplatin and Taxol at a 20% dose reduction so Taxol dose is 260 mg and Carboplatin is 440 mg.  · 12/16/13 - CA-125 12.6 (N).  · 12/19/13 - PET/CT scan.  Impression:  1. There is no evidence of hypermetabolism in the liver.  Specifically of the dominant lesion in or adjacent to the right hepatic dome that was seen and described on the recent CT study of 11/26/2013.  It is photopenic relative to the surrounding liver.  2.  There is no evidence of hypermetabolic soft issue mass lesion or free fluid in the abdomen or pelvis.  3.  The tonsillar tissues are slightly enlarged and have moderate activity level.  This maybe physiologic.  Correlation with oral cavity, examination is recommended.  4.  Mild amount of activity in the right level II jugular lymph chain without focally enlarged lymph nodes is of questionable significance.  · 1/6/13 - The patient received cycle 5 of chemotherapy with Taxol and Carboplatin.  · 1/27/14 - The patient started on cycle 6 of Taxol and Carboplatin.  · 2/18/14 - CT of the abdomen and pelvis with contrast; stable lesions impressing upon the hepatic dome, unchanged compared to the prior exam.  Multiple anterior abdominal wall hernias re-demonstrated.  Those above the umbilicus contain omental fat.  Below and to the left of the umbilicus as anterior abdominal hernia containing omental fat in nondilated small bowel which is larger than seen previously.  · 2/20/14 - The patient received cycle 7 of chemotherapy with Taxol and Carboplatin.   · 3/11/14 - Order written to discontinue chemotherapy due to patient has completed 7 cycles of chemotherapy and CT scans suggest possible remission.  · 3/11/14 -  11.0 (N).  · 06/05/14 - CT scan of the chest abdomen and pelvis with contrast: There are multiple anterior abdominal wall hernias.  There are 2 larger anterior abdominal wall hernias at the level of the transverse colon, which contain omental fat.  There are at least 2 small anterior  abdominal wall hernias that contain omental fat.  There is a moderate sized anterior abdominal wall hernia at the level of the umbilicus, eccentric to the left, which contain non-dilated bowel.  Interval decrease in the size of two deposit impressing on the liver.  The third tiny deposit has been stable.  · 8/19/14 -  10.4 (N).  · 12/19/14 -   10.0 (N)  · 1/7/15 - CT chest, abdomen and pelvis with stable appearance of the chest with several micronodules. Small hiatal hernia, slightly larger than previous exam, increased from 1.5 to 2.5 cm in diameter. Bochdalek hernia unchanged. Multiple hepatic lesions. The two dominant lesions at the right hepatic dome had decreased in size from previous. The remaining tiny nodules measured 3-5 mm in diameter and were unchanged. There was no evidence of new intra-abdominal or pelvic mass or free fluid. There were multiple anterior abdominal wall hernias which had increased in size.   · 7/27/15 - Comprehensive metabolic panel with no significant abnormalities. CA-125 of 21 (0-35).   · 10/26/15 - WBC 6, hemoglobin 13, platelet count 204,000. Heart rate 47. CA-125 of 20 (0-35).    · 2/18/16 - CT chest with contrast with stable micronodular density seen in the lungs without significant change from prior exam. CT abdomen and pelvis with regression of liver lesions with no new evidence of metastasis. Multiple ventral hernias again noted without significant change from prior exam. Creatinine 0.9 (0.6-1.3).    · 2/25/16 - Patient hospitalized at [facility] between 2/25/16 and 2/27/16 with pyelonephritis secondary to E-coli. She was given two days of IV Rocephin and then placed on oral Levaquin. She was asked to hold her Coumadin, but then had nausea and vomiting because of Levaquin and stopped the Levaquin also. Her CA-125 was 32 (0-35) and CEA 1.3 (0-5). Her urine grew >100,000 E-coli. CT of the abdomen and pelvis was done which revealed 4.1 x 1.8 cm sized mild  ovoid area of decreased density in the liver parenchyma along the anteromedial aspect of the dome of the right lobe of the liver, unchanged significantly since the previous study of 2/18/16. There was suspicion of a very small pericardial effusion. There was suspicion of a small hiatal hernia. There were several hernias in the anterior abdominal wall. A 3.5 x 3.1 cm incised well-circumscribed abnormal density in the left retroperitoneal fatty soft tissue at the level of the left iliac crest was suggestive of tumor recurrence. There was an abnormal density in the left retroperitoneal soft tissues in the left flank that partially surrounded the left kidney and extended downward to the left pelvic area. Diverticulosis of the distal descending colon and sigmoid colon were seen.     · 3/8/16 - Patient prescribed Bactrim DS 1 p.o. b.i.d. for 10 days for pyelonephritis, which was partially treated with Rocephin and Levaquin. Urine with 40,000 E-coli treated with Macrobid for 7 days.  of 20 (0-35).    · 3/31/16 - PET scan with area of low density anteriorly in the right lobe of the liver with no significant radiopharmaceutical uptake to suggest malignancy. Multiple round soft tissue density masses showing increased radiopharmaceutical activity and multiple anterior abdominal wall hernias.   · 5/10/16 - Patient complains of venous enlargement of the left chest wall around the port. Has not been seen by  yet, but has an appointment on 5/23/16. Complained of a cough.   · 5/12/16 - Infusaport contrast study with no evidence of fibrin sheath. Chest x-ray with mild cardiac prominence.   · 5/23/16 - Patient seen in consultation by Sheng Bowman M.D. at [facility] and a chemotherapy trial recommended.   · 6/1/16 -   of 27.1 (0-35).    · 6/14/16 - WBC 5.71, hemoglobin 12.4, platelet count 188,000. Patient is scheduled for surgery at  on 6/16/16 after further discussion with  physicians. Discussed  adjuvant chemotherapy.   · 6/16/16 - Excisional biopsy of abdominal wall mass performed by Sheng Bowman M.D. at [facility] with pathology revealing metastatic high-grade papillary serous carcinoma.   · 7/7/16 - Received a call from the patient that Tramadol was not working and that she was given Norco #24 after her biopsy at  which did help her pain. Patient prescribed Norco 5/325 one p.o. q. 4-6 hours p.r.n. #60 with no refills. Echocardiogram with left ventricular inferior wall hypokinesis with ejection fraction of 50-55%.   · 7/8/16 - Patient seen in consultation by Sheng Bowman M.D. in consultation at  for metastatic high-grade papillary serous carcinoma diagnosed by excisional biopsy on 6/16/16 with carcinomatosis and patient not a surgical resection candidate. Genetic sequencing and susceptibility testing of tumor was ordered. Biopsy was done of anterior abdominal wall.   · 7/7/16 - Echocardiogram with left atrial enlargement. Mild mitral regurgitation. Left ventricular proximal inferior wall hypokinesis with ejection fraction of 50-55%.   · 7/11/16 - While waiting for genomics results, in order not to delay treatment further, order written for Taxotere 60 mg/M2 IV over one hour followed by Carboplatin AUC 5 over one hour, cycles to be repeated every three weeks.  Comprehensive metabolic panel normal.  26 (0-35).    · 725/16 - Pain contract signed for use of Norco.   · 8/5/16 - Patient stated that she experienced a red rash and was itchy post taking Norco. Norco discontinued and Tramadol refilled.   · 8/16/16 - Patient started cycle 1 chemotherapy. WBC 7.1, hemoglobin 11.2, MCV 88.7, platelet count 94,000. Patient complained of nausea for a week post chemo. Already getting Emend, Aloxi and Decadron. Added Omeprazole 40 mg daily orally.   · 8/17/16 - Urine culture with >100,000 E-coli.   · 8/25/16 - Cycle 2 chemotherapy given.   · 9/9/16 - Magnesium 1.3, replaced intravenously.  Potassium 3.4 (3.6-5.1), increased oral potassium supplementation.    · 9/16/16 - Cycle 3 chemotherapy given.   · 9/19/16 - Comprehensive metabolic panel with no significant abnormality.   · 9/20/16 - CT abdomen and pelvis with evidence of progressive metastatic disease in the abdomen and pelvis with interval enlargement of several soft tissue attenuations and subcutaneous nodules in the anterior abdominal wall. Interval enlargement of a left pelvic soft tissue attenuation mass. A lentiform area of low attenuation in the dome of the liver stable in size. Multiple anterior abdominal wall hernias containing fat and/or bowel. Marked pelvic floor laxity. CT chest negative for evidence of metastatic disease. Tiny pulmonary nodules in the right lung stable since 2013 consistent with a benign etiology.   · 9/23/16 - Macrobid 100 mg p.o. b.i.d. x7 days prescribed for UTI. Current chemotherapy discontinued after discussion and new chemotherapy orders written. Carboplatin AUC-5 IV day 1 and Doxil (Liposomal Doxorubicin) 30 mg/M2 IV day 1 to cycle q. 21 days.  of 18 (0-35). Magnesium 1.6, replaced intravenously. Comprehensive metabolic panel with potassium 3.4 (3.6-5.1).     · 10/13/16 - Patient started on cycle 1 of Carboplatin and Liposomal Doxorubicin followed by Neulasta.   · 11/3/16 - Cycle 2 Carbo and Doxil given.   · 11/17/16 - Magnesium 1.0, replaced intravenously. Oral potassium supplement increased.   · 11/29/16 - CT chest with small non-calcified right pulmonary nodules unchanged. Benign bone island in the midthoracic vertebral body along with benign bony hemangiomas in the middle thoracic vertebral bodies. CT abdomen and pelvis with mild progressive metastatic disease from CT of September 2016. Anterior abdominal wall mass superiorly mildly increased in size with new area noted as described measuring 3 x 1.7 cm. 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 Stable  area of decreased uptake in the dome of the liver not hypermetabolic on recent PET scan, likely representing cyst or focal fatty infiltration. Stable small hiatal hernia, fat-containing Bochdalek’s hernia and anterior abdominal wall hernias with stable pelvic floor relaxation.    · 12/1/16 - Cycle 3 chemotherapy given.   · 12/8/16 - Current chemotherapy discontinued and patient started on Gemcitabine 1000 mg/M2 IV days 1, 8, 15 q. 28 days.   · 12/22/16 - Patient seen in followup by Juliana Roy M.D. at the pain clinic, treated with Oxycodone and Lyrica. Transaminases normal. Patient started cycle 1 chemotherapy.   · 12/29/16 - Magnesium 1.1 (1.8-2.5), replaced intravenously.   · 1/5/17 - Cycle 1 day 15 chemotherapy held as patient leaving for vacation to [place]. Chemotherapy dose decreased by 20%. Patient complains of diarrhea for which Imodium prescribed.   · 1/11/17 - BRCA-1 and BRCA-2 negative.   · 1/12/17 - Echocardiogram with ejection fraction 60% or greater.   · 1/19/17 - Comprehensive metabolic panel with no significant abnormality. Patient started cycle 2 chemotherapy.   · 2/2/17 - Urine with >100,000 E-coli treated with Cipro 500 mg p.o. b.i.d. x1 week.   · 2/6/17 - Magnesium 1.1 (1.8-2.5), replaced intravenously.    · 2/23/17 - Patient started cycle 3 chemotherapy.   · 2/27/17 - CT chest with interval development of too numerous to count very small pulmonary nodules, ill-defined ground glass opacities concerning for development of pulmonary metastatic disease. Stable left-sided chest port. CT abdomen and pelvis with stable appearance of multiple small soft tissue densities within the abdomen near midline subcutaneous fat of the abdominal wall. Interval decrease in size of largely calcified left pelvic mass and multiple fat in bowel containing small ventral hernias.   · 3/6/17 - Pulmonary consultation obtained for lung nodules. Discussed CT results with patient. Decision made to continue present  chemotherapy until further lung evaluation as abdominal disease better.  of 18 (0-35).    · 3/16/17 - Patient started cycle 4 chemotherapy, but treatment held from day 8 onwards because of hospitalization.   · 3/19/17 - Patient was hospitalized at [facility] between 3/19/17 and 3/25/17 with chest pain. Chest x-ray had revealed increased mild bibasilar airspace disease. Troponin I and EKG’s were negative. INR was elevated. Patient was treated with antibiotics. EGD was performed revealing small hiatal hernia and esophageal dilatation was performed. Bronchoscopy was performed also with no intrabronchial lesions identified. IgA was 51 (), IgG 320 (600-1500) and IgM 19 (). Lexiscan nuclear stress test had no evidence of reversible myocardial ischemia with normal left ventricular ejection fraction of 58%. CT chest had development of patchy bilateral ground glass opacities most pronounced involving the left upper lobe and bilateral lower lobes. Multiple tiny bilateral pulmonary nodules were less conspicuous. The patient underwent a bronchoscopy by Jerica Esparza M.D. with right upper lobe bronchoalveolar lavage revealing benign squamous cells and bronchial cells with pulmonary macrophages present. There was mild acute inflammation. Negative for malignant cells. Prilosec was changed to Famotidine for hypomagnesemia.    · 4/6/17 - Magnesium 1.2 (1.8-2.5). Comprehensive metabolic panel with no significant abnormality. Patient seen in follow-up by Fito Ram M.D. at [facility]. Treated with Lyrica and Oxycodone.    · 5/4/17 - Patient complains of a rash itching on her right upper arm. Eczematous rash noted and patient prescribed Cyclocort 0.1% b.i.d. Magnesium infusions continued with each chemo. Order written to resume chemotherapy.   · 5/16/17 - Cycle 5 chemotherapy started.   · 5/26/17 - Magnesium 1.4 (1.8-2.5), replaced intravenously. Potassium 2.9 (3.6-5.1), KCL increased to 20 mEq three in the  morning and three in the evenings.   · 5/30/17 - PET scan with remaining metabolic activity within the nodular areas. The suggested mass which is partially calcified and necrotic within the left aspect of the pelvis seems slightly larger with increased metabolic activity. There was more calcification in these areas compared to prior study, suggesting inflammation.      · 6/8/17 - Patient complaining of shortness of breath. Does take HCTZ at home. Chest x-ray ordered and chemotherapy continued along with weekly magnesium replacement. Chest x-ray with no acute cardiopulmonary abnormalities.   · 6/13/17 - Patient received cycle 6 of chemotherapy.   · 7/31/17 - WBC 5.0, hemoglobin 11.2, MCV 89.7, platelet count 217,000. Patient is to resume chemotherapy starting with cycle 7 on Wednesday of this week.  of 19 (<35). Potassium 3.3 (3.5-5.3).     · 8/2/17 - Patient began cycle 7 of chemotherapy.   · 8/30/17 - Patient started cycle 8 chemotherapy.   · 9/5/17 - Patient seen in followup at Pain Management by Fito Ram M.D. and continued on treatment with Oxycodone and Lyrica.   · 9/13/17 - Patient was hospitalized at [facility] for a day with dizziness secondary to dehydration, hypokalemia and anemia. She was given 1 unit of packed red blood cells and electrolytes were replaced. Chest x-ray revealed a subtle opacity in the left lateral lung base favored to represent atelectasis. Magnesium 1.3 (1.5-2.5), patient continued on replacement.    · 9/22/17 - Chemotherapy and magnesium replacement continued with decrease in chemotherapy dose by 10% secondary to cytopenias.   · 9/25/17 - Cycle 9 chemotherapy started.   · 10/12/17 - CT of the chest with contrast showed several tiny pulmonary nodules. One within the right lower lobe appears new from prior exams. Two adjacent foci of nodularity within the medial right lower lobe suggestive of minor infectious or inflammatory process. CT of the abdomen and pelvis with contrast  which showed soft tissue nodules along the anterior abdominal and pelvic walls unchanged from previous scan. Also a partially calcified mass within the left pelvis unchanged. No acute process identified within the abdomen or pelvis. Several left paracentral ventral hernias unchanged. No evidence of acute bowel obstruction.   · 10/16/17 - Order written for Levaquin 500 mg p.o. daily as the patient states that she has had a productive cough, fever and chills and with the results of the CT scan showing a possible infectious versus inflammatory process. Order also written to continue chemotherapy and magnesium replacement as previously prescribed.   · 10/23/17 - Cycle 10 chemotherapy started.   · 11/19/17 - Patient went to the Emergency Room at [facility] complaining of right lower extremity redness and fever for a day. Venous Doppler had no evidence of a DVT and patient was discharged home on Clindamycin.   · 11/21/17 -  of 17 (0-35).   · 12/4/17 - Cycle 11 chemotherapy started.   · 12/20/17 - PET scan with multiple hypermetabolic soft tissue nodules abutting the anterior abdominal wall, stable from previous examination. Peripherally calcified left lower quadrant mass near the ascending colon stable in size demonstrating no hypermetabolic uptake. Previously had maximum SUV of 7. Right medial basilar pulmonary nodules resolved.   · 12/22/17 - CT left lower extremity with soft tissue swelling with skin thickening present along the anterior and medial aspect of the leg, slightly more pronounced around the palpable marker seen within the upper medial aspect of the lower extremity.   · 12/26/17 - Elastic stockings prescribed for lower extremity edema.   · 1/8/18 - Patient hospitalized at [facility] between 1/8/18 and 1/11/18 with fever of up to 101 degrees and painful rash on the lower extremities of several weeks’ duration. She was found to be hypokalemic and MRI of the lower extremities revealed thickening and swelling. Chest  x-ray had no acute cardiopulmonary abnormality. Skin biopsy was performed by Surgery revealing stasis dermatitis and no evidence of malignancy. Infectious Disease consultation was obtained and IV antibiotics were stopped. Blood cultures had no growth.   · 1/22/18 - Patient asked to start wearing elastic stockings which she has not started yet. She was given a prescription for Dyazide 1 p.o. daily.   · 2/26/18 - Ordered chemo to resume again. Patient unaware that she was supposed to resume chemo after her last visit in January. Continue magnesium infusions on day 1, 8 and 15. Prescribed Levaquin 500 mg p.o. daily x10 days for productive cough x1 week. History of viral illness consistent with influenza.  of 18 (0-35). Chest x-ray with no acute process.    · 3/5/18 - Cycle 12 chemotherapy started.   · 3/26/18 - Options discussed with patient. Decision made to discontinue IV Gemzar and orders written to start on Niraparib (Zejula) 300 mg p.o. daily as maintenance therapy.   · 4/11/18 - Niraparib discontinued due to insurance denial. Orders written to start Carboplatin-AUC 5 IV day 1 cycling every 21 days. Orders written for Neulasta 6 mg subcutaneous kit day 1 with palliative treatment intent and expected duration of treatment three months.   · 4/12/18 - CT chest, abdomen and pelvis with contrast revealed numerous tiny centrilobular nodules in the lungs favored to reflect infectious or inflammatory process. Nodules ranged 1-3 mm in size and are new from prior studies with metastatic disease not entirely excluded. Stable small hiatal hernia. Interval progression of metastatic disease involving the subcutaneous tissues at the ventral abdominal wall. Multiple soft tissue density nodules previously shown to be hypermetabolic on PET scan. New small crescent of low attenuation material along the periphery of the spleen with similar finding at the dome of the liver. Findings are concerning for peritoneal metastases.  Density of the dome of the liver remained unchanged from multiple prior studies. Stable calcified mass in the left pelvic sidewall. Urinary bladder wall thickening.   · 4/23/18 - Cycle 1 chemotherapy with Carboplatin administered along with Neulasta injection.   · 4/26/18 - Neulasta discontinued due to insurance denial.   · 5/14/18 - Patient received cycle 2 Carboplatin.   · 6/5/18 - Cycle 3 day 1 chemotherapy with Carboplatin administered.   · 6/20/18 - CT chest, abdomen and pelvis at [facility] showed re-demonstration of soft tissue mass in the ventral wall hernia left of the midline as well as the dome of the liver, likely representing metastatic disease similar as compared to the prior study. Additional calcified lesions present in the upper left hemipelvis and along the anterior abdominal wall to the right of the midline also likely representing metastatic disease. No definite new lesions were identified. Moderate to large stool burden likely related to mild constipation was present. No suspicious lung nodules were identified. The previously-described ground glass nodules appeared to have resolved. There was a stable subpleural nodule in the right upper lobe measuring 3 mm present since 2014 without significant changes.   · 6/26/18 - Cycle 4 day 1 Carboplatin administered with addition of Neulasta for febrile neutropenia prophylaxis.   · 6/29/18 - Reviewed CT scans with patient. Orders written to discontinue Carboplatin and Neulasta and plan to start Niraparib 300 mg by mouth daily as maintenance therapy. Prescribed Amlodipine 2.5 mg p.o. daily for chemo-induced hypertension.   · 7/18/18 - Orders written to increase weekly Magnesium Sulfate to 3 g IV weekly due to continued hypomagnesemia.   · 8/1/18 - Patient reports she has not received Niraparib (Zejula) to date.   · 8/30/18 - Patient’s phone was not working so though Niraparib approved, the drug company had not been able to get ahold of her. Patient  supplied with drug company number so that she can start taking the pills.   · 9/6/18 -  of 20 (N).   · 9/18/18 - Urinalysis done for dysuria and back pain. Urine culture grew 20,000 to 50,000 colonies of urogenital ruy. Prescribed Bactrim DS one tablet twice daily for one week.   · September 2018 - Patient initiated on Zejula 300 mg p.o. daily.   · 10/1/18 - WBC 3.5, hemoglobin 12, platelet count 74,000. Niraparib (Zejula) dose held and then resumed when platelets above 100,000 at a dose of 200 mg daily.   · 10/15/18 - Patient received Niraparib (Zejula) at 200 mg p.o. daily with a platelet count of 198,000.   · 11/7/18 - WBC 6, hemoglobin 11.6, MCV 96.2, platelet count 137,000. Patient claims to have stopped taking Niraparib a few days prior because of cough. Asked to resume taking it. Ciprofloxacin 500 mg p.o. twice daily for one week for bronchitis.   · 12/3/18 - Magnesium Sulfate infusions changed to every two weeks, to send mag level before and give 3 grams. DC’d Magnesium Oxide and started Magnesium Plus Protein two pills twice daily.   · 1/4/19 - CT chest, abdomen and pelvis with new patchy nodular areas of airspace consolidation within the bilateral upper lobes, left greater than right, favored to be infectious or inflammatory. Interval enlargement of the peritoneal soft tissue masses within the pelvis compatible with progression of disease. Enlarging ventral hernia now containing multiple loops of small bowel and colon.   · 1/7/19 - Patient has not been coming in for weekly magnesium replacement as nursing has not been able to get ahold of her. Results of CT’s discussed with patient. Decision made to change her to IV chemotherapy with Carboplatin AUC-5 day 1 and pegylated liposomal Doxorubicin (Doxil) 30 mg/M2 IV day 1 to cycle every four weeks. Patient made aware of the limited choices of her treatment available.   · 1/31/19 - Echocardiogram showed LVEF 50-55% and otherwise normal echo Doppler  study.   · 2/4/19 - New chemotherapy not initiated to date with difficulty contacting patient for echocardiogram. Neulasta On-Pro 6 mg ordered with chemotherapy due to extensive prior exposure to chemotherapy, increasing risk of febrile neutropenia, history of neutropenic events on prior chemotherapy regimens.   · 2/15/19 - Patient started cycle 1 chemotherapy with Neulasta support.   · 3/6/19 -  of 28 (0-35).   · 3/15/19 - Cycle 2 Carboplatin with Liposomal Doxorubicin (Doxil) and Neulasta support initiated.     · 4/10/19 - Patient was requested to followup with Dr. Queen regarding hypotension and blood pressure medication dosing. Patient appears to be tolerating treatment well with cytopenias not requiring dose adjustments. No Doxil-related skin or mucus membrane toxicities or other significant toxicities to date.   · 4/12/19 - Cycle 3 chemotherapy given.   · 4/16/19 - CT chest, abdomen and pelvis with no evidence of active metastasis to the chest. Several tree-in-bud nodules within the periphery of the inferior right upper lobe likely infectious or inflammatory. Disease burden within abdomen and pelvis stable to slightly increased from prior CT. Specifically, a soft tissue mass along the right rectus muscle slightly increased in bulk. Multiple ventral hernias, some containing loops of bowel, with no evidence of acute bowel obstruction.   · 5/8/19 - Discussed CT results with patient. Overall stable disease and would like to continue same treatment. Dove soap and Hydrocortisone Cream p.r.n. prescribed for scattered rash on back.   · 5/10/19 - Cycle 4 Carboplatin and Liposomal Doxorubicin initiated.   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 It was ER positive, HER2/zuleima negative, PD-L1 negative. There was TOPO1 positivity and TUBB3 positivity. TMB was low (two mutations per megabyte MUTS/MB) and MSI was stable. There were 13 therapies  considered with increased benefit. The 13 therapies with increased benefit included Fulvestrant, Irinotecan, Letrozole, Topotecan, Anastrazole, Exemestane, Tamoxifen, all of which were referenced to [facility] as well as Abemaciclib, Everolimus, Gefitinib, Palbociclib, Ribociclib and Toremifene.     · 6/5/19 echocardiogram with preserved LV systolic function with EF around 60%.  · 6/7/19 cycle 5 chemotherapy with Neulasta support given.  Patient continued on mag oxide 400 mg p.o. twice daily and weekly IV 3 g mag sulfate infusions for persistent hypomagnesemia.  · 7/5/2019- cycle 6 chemotherapy with carboplatin and doxorubicin with Neulasta port initiated.  Ca125:  21 (0-35).  · 7/23/2019-CT chest abdomen and pelvis compared to CT from 4/16/2019 revealed multiple small pulmonary nodules unchanged from prior examination within the right upper and left lower lobes.  Complex ventral hernia similar to prior exam.  Soft tissue nodule along the right lateral margin of the right of the midline measuring 12 x 10 mm unchanged in size.  Irregular soft tissue nodular thickening along the margin of the most inferior aspect of the complex ventral hernia with thickness measuring up to 13 mm similar to prior examination.  Extensive calcified and soft tissue mass within the left lower quadrant decrease in size now measuring 2.6 x 2.3 cm previously 3.0 x 2.5 cm.  Partially calcified mass involving the right rectus sheath measuring 3.1 x 2.7 cm previously measured 3.3 x 2.9 cm.  Densely calcified mass measuring 2.1 x 1.6 cm unchanged previously measured 2 x 1.7 cm.  Right external iliac chain lymph node measuring 9 mm previously measured 5 mm.  · 8/2/2019 cycle 7 chemotherapy given.  · 8/30/2019-cycle 8 chemotherapy with carboplatin and doxorubicin with Neulasta support given.  · 9/27/2019 cycle 9 chemotherapy given.  · 10/1/19 - Echocardiogram showed Normal LV size and contractility EF of 60-65%. Normal RV size. Borderline left atrial  enlargement. Aortic valve, mitral valve, tricuspid valve appears structurally normal, no significant regurgitation seen.No pericardial effusion seen. Proximal aorta appears normal in size.  · 10/18/19 - Magnesium 1.5 (1.8-2.5).  IV magnesium replacement continued.  · 10/25/2019 cycle 10 chemotherapy given.  · 10/29/2019 patient had CT scan of the chest abdomen and pelvis-there is a new 2 mm nodule in the left lower lobe with a stable 2 mm nodule in the left lower lobe.  Follow-up in 6 months was recommended.  Stable multiple soft tissue density and calcified nodules within the ventral abdominal wall and left retroperitoneal fat consistent with nonviable metastatic disease.  These nodules have either decreased in size or stable.  · 11/22/2019 10 received cycle 11 of chemotherapy with Doxil and carboplatinum with Neulasta  · 12/8/2019 to 12/11/2019 patient was admitted to the hospital for neutropenic fever.  · 12/20/2019 patient received cycle 12 of chemotherapy with Doxil and carboplatinum with Neulasta  · 1/13/20: 2 D echo was normal with EF 56% to 60%  · 1/24/20-Patient received cycle 13 of combination chemotherapy with carboplatinum and Doxil  · 2/3/2020 patient had a  which was 25.4  · 2/21/2020-patient received cycle 14 of chemotherapy with carboplatinum and Doxil  · 3/6/2020 patient had CT scan of the chest, abdomen and pelvis this revealed a 3 mm nodule in the left lower lobe present on prior CT of the abdomen unchanged from July 2019.  Stable 3 mm right upper lobe nodule.  There is a lymph node in the AP window measuring 8 x 9 mm prominent left hilar lymph node measuring 12 mm previously was 7 x 7 mm no significant adenopathy was seen in the abdomen there is an area of irregular soft tissue in the left paramidline ventral hernia which is stable measuring 5.7 cm partially calcified mass in the right rectus measuring 3 x 2.5 cm which is also stable the prominent lymph nodes in the left mid hilum and  mediastinum may be reactive or metastatic disease  · 4/17/2020-patient had cycle 15 of single agent carboplatinum  · 5/26/2020 patient had CT scan of the chest, abdomen and pelvis showed 3 new lung nodules measuring 7 mm in the left upper lobe, 5 mm in the left lower lobe and 4 mm in the right lower lobe.  There were additional small lung nodules which were unchanged.  Mildly prominent mediastinal and bilateral hilar lymph nodes mildly increased in size.  Right hilar node 9 mm previously was 5 mm.  Carinal node 9 mm previously was 6 mm.  The nodule at the right side of the hernia sac has increased to 1.5 cm from 1.2 cm.  Also a nodule in the subcutaneous fat currently measures 2.4 was previously 2 cm.  · 5/15/2020-patient received cycle 16 of carboplatinum  · Since the last visit in the office patient patient developed dizzy spell and fell. For that reason she was taken to the emergency room at [place].    · 7/3/2020 she had brain MRI which showed an 8 mm focus of abnormality in the left aspect of the posterior fossa corresponding to a dural based enhancing lesion thought to represent a calcified meningioma vessels calcified dural based metastasis.  This lesion has a slight local mass-effect and a small amount of surrounding edema.  There is also a 4 to 5 mm rounded focus of abnormal nodular enhancement along the cortex of the left parietal lobe but no significant mass-effect.  This is nonspecific could represent neoplastic process, infectious/inflammatory process or granulomatous disease.  · Patient was seen by neurosurgery, follow-up brain MRI in 8 weeks has been recommended by Dr. Hartman  · 7/9/2020 patient was initiated on single agent topotecan cycle 1 day 1  · 7/16/2020 she received the 8 topotecan  · 8/6/2020 patient received cycle 1 day 15 of topotecan  · 9/2/2020 patient had brain MRI multiple hospital which showed interval increase in size of the metastasis in the left parietal lobe now measuring 9 x 7 mm.   Previously was 5 mm.  There has been interval increase in size of the left superior cerebellar metastases now measuring 1.3 cm previously was 8 mm.  There was no new metastatic disease identified.  · 9/11/2020 patient received cycle 2-day 15 of single agent topotecan  · Was admitted to the hospital 10/1/2020 for supratherapeutic INR  · 10/13/2020 patient was seen by Dr. Delong she has started stereotactic brain radiation to be completed on 10/28/2020  · 11/6/2020 patient received cycle 3-day 1 of chemotherapy with topotecan  · 11/13/2020 patient received cycle 3-day 8 of topotecan  · 11/30/2020 patient had CT scan of the chest, abdomen and pelvis this showed new reticular nodular interstitial thickening within the right lung mostly in the right middle lobe and right lower lobe worrisome for lymphangitic spread of cancer.  Evidence of progression of metastatic disease in the chest, abdomen and pelvis the new tiny sclerotic foci within the spine worrisome for osseous metastatic disease.  · 12/17/2020 - Discontinued topotecan due to progressive disease with orders written to begin Alimta 500 mg per metered squared IV q. 21 days  · 12/30/2020 - Started cycle 1 day 1 Alimta   · 1/18/2021 patient had bone scan which showed chest metastatic disease.  Xgeva has been ordered  · 3/12/2021 patient received cycle 1 of combination chemotherapy with cisplatinum and  Gemzar  · 4/23/21: Patient had cycle 2 day with cis-platinum and Gemzar  · 4/30/2021: Patient received cycle 2-day 8 of chemotherapy with cis-platinum and Gemzar  · 5/6/2021 patient had a CT scan of the chest which shows evidence of progressive disease  · 6/15/2021: Patient was admitted to the hospital for supratherapeutic INR, probable pneumonia.  She has since been discharged.     2. Deep vein thrombosis of left upper extremity diagnosis established in October of 2013.  · 10/16/13 - Venous Doppler of left upper extremity.  Impression:  Deep vein thrombosis involving  the subclavian, axillary and brachial veins on the left side, superficial vein thrombosis involving the left basilic vein.  · 10/16/13 - Order written for Lovenox 120 mg subcutaneously daily until INR is in therapeutic range.  Order written for Coumadin 5 mg p.o. daily.  The patient is to follow PT and INR protocol.  · 5/20/16 - Venous Doppler bilateral lower extremities normal.  · 7/30/19 - Patient continued on Coumadin following the INR protocol.      3. Anemia diagnosis established in November 2013 and pernicious anemia diagnosis established March 2016.   · 11/15/13 - Hemoglobin is 7.8.  · 12/2/13 - Orders written to start Aranesp 200 mg subcutaneous every two weeks due to low hemoglobin secondary to chemo induced anemia.  Hemoglobin is 9.7.  Anemia workup is ordered.  · 12/03/13 - Ferritin 179.8 (N), folic acid 8.3 (N), vitamin B12 314, iron 104 (N), TIBC 298 (N), iron saturation 35 (N), Reticulocyte count 0.88 (N).  EPO level 124 (N).  Haptoglobin 22 (H).  · 10/22/14 - Hemoglobin 12.5, hematocrit 37.3, MCV 91.6.  · 10/23/14 - Anemia resolved.   · 3/8/16 - WBC 5.8, hemoglobin 11.7, MCV 90.3, platelet count 245,000. Vitamin B12 of 189 (180-914), ferritin 61 (), iron 91 (), TIBC 345 (228-428).   · 3/15/16 -  ().        · 3/22/16 - Intrinsic factor antibody positive, antiparietal antibody negative. Vitamin B12 at 1000 mcg IM weekly started, but the patient after receiving first dose on 3/22/16 did not come back for injections until 4/13/16.   · 4/13/16 - WBC 5.1, hemoglobin 12.5, MCV 87.9, platelet count 201,000. Patient given B12 injection and then continued at home.   · 5/10/16 - WBC 5.1, hemoglobin 12.5, platelet count 201,000.   · 6/14/16 - Monthly Vitamin B12 injections continued at home.   · 7/11/16 - WBC 5.48, hemoglobin 12.7, MCV 86.2, platelet count 200,000.   · 8/16/16 - Patient continued on monthly Vitamin B12 injections at home.   · 1/5/17 - WBC 2.6 with 38% neutrophils, 56%  lymphocytes, 5% monocytes, hemoglobin 10.5, .3, platelet count 63,000. Patient continued on Vitamin B12 injections 1000 mcg IM monthly at home.   · 3/20/17 - Retic 0.41 (0.5-1.5), creatinine 1.0 (0.4-1.0). Iron 12 (), TIBC 270 (228-428), ferritin 259 (), haptoglobin 340 (), TSH 0.43 (0.34-5.6), folate 6.0 (5.9-24.8). Serum protein electrophoresis revealed decreased gammaglobulins. Serum EDU had no monoclonal gammopathy identified. Stool Hemoccult was negative.   · 6/8/17 - WBC 6.3, hemoglobin 8.3, MCV 92.4, platelet count 50,000. Procrit 40,000 units subq weekly added for chemotherapy-induced anemia. Patient continued on Vitamin B12 at 1000 mcg IM monthly at home.   · 7/5/17 - Iron 33 (), TIBC 328 (228-428), ferritin 211 (), TSH 2.46 (0.34-5.6).       · 7/31/17 - WBC 5.0, hemoglobin 11.2, MCV 89.7, platelet count 217,000. We will continue with the Procrit 40,000 units subq weekly for chemo-induced anemia when the hemoglobin falls below 10.0 and the patient is also to continue with the Vitamin B12 at 1000 mcg IM monthly at home. Creatinine 1.24 (0.5-0.99).    · 8/28/17 - WBC 9.6, hemoglobin 8.7, MCV 93.7, platelet count 133,000. Patient is to continue with the Procrit 40,000 units subq weekly and will receive that today. She is also to continue with the Vitamin B12 at 1000 mcg IM monthly at home.  · 10/16/17 - WBC 7.5, hemoglobin 9.6, MCV 98.4, platelet count 195,000. Patient is to continue with Procrit 40,000 units subq weekly and will receive Procrit today.   · 1/1/18 - Creatinine 1.3 (0.4-1.0).    · 2/19/18 - Procrit given for hemoglobin 9.9.   · 2/26/18 - Continue Procrit 40,000 units weekly p.r.n. hemoglobin <10. Continue Vitamin B12 at 1000 mcg IM monthly at home.   · 3/26/18 - Hemoglobin 8.3. Procrit dose increased to 60,000 units weekly. Iron 29 (), TIBC 306 (228-428), and iron sat 9% (15-50). Iron 29 (), TIBC 306 (228-428), iron saturation 9% (15-50).    · 3/27/18 - Order written to start Ferrous sulfate 325 mg p.o. b.i.d.    · 4/2/18 - Stool heme negative x3.   · 4/30/18 - Patient had not started Ferrous sulfate 325 mg p.o. b.i.d. and verbalized understanding to do so and to notify the office if side effects are not tolerable.   · 5/24/18 - Patient was hospitalized at [facility] between 5/24/18 and 5/26/18 with dark tarry stool. INR was subtherapeutic. She was given IV Protonix and Protonix 40 mg daily. She underwent an EGD by David Rogers M.D. revealing small hiatal hernia, small submucosal nodule near the cardia, erosive gastritis. Pathology on gastric antrum and body biopsy revealed iron pill gastritis and no evidence of Helicobacter pylori. She then underwent a colonoscopy revealing diverticulosis, hemorrhoids and surgical changes from previous resection. It was recommended to consider outpatient M2A if hemoglobin continued to drop.   · 6/4/18 - Order written to discontinue iron pills and to use Injectafer 750 mg IV days 1 and 8 for low iron sats. Order written for Procrit 40,000 units subq weekly. Iron 65 (), TIBC 328 (228-428), iron saturation 20% (15-50), ferritin 123 ().   · 6/29/18 - Discussed patient’s intolerance of oral iron due to gastritis. Oral iron discontinued with plans for Injectafer should iron level drop in the future.   · 11/7/18 - Patient claims not to be taking Vitamin B12 injections at home. Asked to resume those.   · 12/3/18 - Patient reports she has not been taking her B12 injections for a couple of months. She needs some syringes and needles for that.   · 2/4/19 - Patient was uncertain if she is currently taking ferrous Sulfate or not. Patient with history of intolerance and will follow hemoglobin.   · 5/8/19 - Ferritin 64 ().  · 8/27/2019- iron 52 (), iron saturation 14% (15-50), TIBC 364 (228-420), and ferritin 74 ().  Injectafer 750 mg IV day 1 and 8 due to oral iron intolerance  ordered.  · 8/30/2019- Injectafer 750 mg IV day 1 given.  Hemoglobin 10.8.  At the end of the infusion heart rate was 48 and the patient reported mild dizziness.  Patient was sent to the ED for evaluation with symptoms resolving rapidly.  Chest x-ray and CT head were negative for acute findings.  · 9/6/2019-Injectafer 750 mg IV day 8 given without adverse effects.  Hemoglobin 9.8.   · 9/25/19 - Iron 85 (). Iron saturation 25 (15-50). TIBC 335 (228-428). Ferritin  694 ().  Hemoglobin 10.3.  · 10/25/2019 hemoglobin 9.7.  Patient started on Retacrit 40,000 units subcu weekly.  · 12/17/2020 hemoglobin 11.9, hematocrit 38.3     Past Medical History:   Diagnosis Date   • Anemia 2013   • Cervical disc disorder 2013    Herniated disc, pinched nerve   • Clotting disorder (CMS/HCC) 2013    Low platelets from chemo   • Colon polyp 2013   • Deep vein thrombosis (CMS/HCC) 2013   • Diverticulosis 2013   • GERD (gastroesophageal reflux disease) 2016   • HL (hearing loss) 2016    I need hearing aids   • Hyperlipidemia 2013    Need my cholesterol rechecked   • Hypertension    • Joint pain 2013    Shoulder pain, torn rotator cuff   • Low back pain 2013   • Neuromuscular disorder (CMS/HCC) 2015    Shingles, pinched nerves in my neck   • Osteopenia 2014   • Osteoporosis    • Ovarian cancer (CMS/HCC)     ovarian--  spread to lungs 10/2020   • Ovarian cancer on left (CMS/HCC) 1995   • Pneumonia 2010    Have had it several times   • Spinal stenosis 2013    Cervical   • Urinary tract infection 2013    Have had several utis       Past Surgical History:   Procedure Laterality Date   • BRONCHOSCOPY N/A 2/10/2021    Procedure: BRONCHOSCOPY with bronchioalveolar lavage;  Surgeon: Jerica Esparza MD;  Location: [facility] ENDOSCOPY;  Service: Pulmonary;  Laterality: N/A;  post op:right lobe pneumonia   • BRONCHOSCOPY N/A 6/15/2021    Procedure: BRONCHOSCOPY with bronchoalveolar lavage;  Surgeon: Jerica Esparza MD;  Location: [facility] ENDOSCOPY;   Service: Pulmonary;  Laterality: N/A;  lung cancer;pneumonia   • CATARACT EXTRACTION     • COLON SURGERY     • COLONOSCOPY  05/26/2018   • EXPLORATORY LAPAROTOMY     • HERNIA REPAIR     • HYSTERECTOMY     • OOPHORECTOMY           Current Outpatient Medications:   •  acetaminophen (TYLENOL) 500 MG tablet, Take 1,000 mg by mouth Every 6 (Six) Hours As Needed for Mild Pain ., Disp: , Rfl:   •  albuterol sulfate  (90 Base) MCG/ACT inhaler, Inhale 2 puffs Every 4 (Four) Hours As Needed for Wheezing., Disp: 18 g, Rfl: 0  •  atorvastatin (LIPITOR) 20 MG tablet, Take 20 mg by mouth every night at bedtime., Disp: , Rfl: 3  •  calcium carbonate (Calcium 600) 600 MG tablet, Take 1 tablet by mouth 2 (two) times a day., Disp: 60 tablet, Rfl: 11  •  cetirizine (zyrTEC) 10 MG tablet, Take 1 tablet by mouth Every Night., Disp: 30 tablet, Rfl: 0  •  cyanocobalamin 1000 MCG/ML injection, Inject 1,000 mcg into the appropriate muscle as directed by prescriber Every 28 (Twenty-Eight) Days., Disp: , Rfl:   •  dexamethasone (DECADRON) 4 MG tablet, Take 2 tablets in the morning daily on days 2, 3 & 4.  Take with food., Disp: 6 tablet, Rfl: 5  •  famotidine (PEPCID) 40 MG tablet, Take 1 tablet by mouth Every Morning Before Breakfast., Disp: 90 tablet, Rfl: 1  •  folic acid (FOLVITE) 1 MG tablet, Take 1,000 mcg by mouth Daily., Disp: , Rfl:   •  HYDROcodone-homatropine (HYCODAN) 5-1.5 MG tablet tablet, Take 1 tablet by mouth 2 (two) times a day., Disp: 60 tablet, Rfl: 0  •  MAGnesium-Oxide 400 (241.3 Mg) MG tablet tablet, Take 400 mg by mouth 2 (Two) Times a Day., Disp: , Rfl:   •  montelukast (SINGULAIR) 10 MG tablet, TAKE 1 TABLET BY MOUTH EVERY NIGHT, Disp: 30 tablet, Rfl: 2  •  ondansetron (ZOFRAN) 8 MG tablet, Take 1 tablet by mouth 3 (Three) Times a Day As Needed for Nausea or Vomiting., Disp: 30 tablet, Rfl: 5  •  oxyCODONE (ROXICODONE) 10 MG tablet, Take 1 tablet by mouth Every 4 (Four) Hours As Needed for Moderate Pain .,  Disp: 180 tablet, Rfl: 0  •  potassium chloride (K-DUR,KLOR-CON) 20 MEQ CR tablet, Take 2 tablets by mouth 3 (Three) Times a Day., Disp: 120 tablet, Rfl: 3  •  pregabalin (LYRICA) 100 MG capsule, Take 1 capsule by mouth 3 (Three) Times a Day., Disp: 90 capsule, Rfl: 2  •  sertraline (ZOLOFT) 50 MG tablet, Take 50 mg by mouth Every Night., Disp: , Rfl:   •  triamterene-hydrochlorothiazide (DYAZIDE) 37.5-25 MG per capsule, TAKE 1 CAPSULE BY MOUTH EVERY DAY, Disp: 90 capsule, Rfl: 1  •  warfarin (COUMADIN) 1 MG tablet, Take 1 tablet by mouth Daily. At 1700 as directed, Disp: 30 tablet, Rfl: 3  •  temazepam (RESTORIL) 30 MG capsule, Take 1 capsule by mouth At Night As Needed for Sleep., Disp: 30 capsule, Rfl: 1    Current Facility-Administered Medications:   •  theophylline (PEG-24) 24 hr capsule 300 mg, 300 mg, Oral, Q24H, DrawJerica MD    Allergies   Allergen Reactions   • Morphine Nausea Only and Hives   • Penicillin V Potassium Rash   • Sulfa Antibiotics Rash   • Levaquin [Levofloxacin] Nausea Only and Dizziness     When taken with Coumadin   • Amoxicillin Rash       Family History   Problem Relation Age of Onset   • Hypertension Mother    • Cancer Father    • Hypertension Father    • Hypertension Sister    • Hypertension Brother    • Stroke Brother        Cancer-related family history includes Cancer in her father.    Social History     Tobacco Use   • Smoking status: Never Smoker   • Smokeless tobacco: Never Used   Vaping Use   • Vaping Use: Never used   Substance Use Topics   • Alcohol use: No     Comment: caffeine 32-64oz qd   • Drug use: No     HPI, ROS and PFSH have been reviewed and confirmed on 7/2/2021.     SUBJECTIVE:    Patient is here today for follow-up.  Her cough has significantly improved on Hycodan      REVIEW OF SYSTEMS:    Review of Systems   Constitutional: Negative for chills and fever.   HENT: Negative for ear pain, mouth sores, nosebleeds and sore throat.    Eyes: Negative for photophobia  "and visual disturbance.   Respiratory: Negative for wheezing and stridor.    Cardiovascular: Negative for chest pain and palpitations.   Gastrointestinal: Negative for abdominal pain, diarrhea, nausea and vomiting.   Endocrine: Negative for cold intolerance and heat intolerance.   Genitourinary: Negative for dysuria and hematuria.   Musculoskeletal: Negative for joint swelling and neck stiffness.   Skin: Negative for color change and rash.   Neurological: Negative for seizures and syncope.   Hematological: Negative for adenopathy.        No obvious bleeding   Psychiatric/Behavioral: Negative for agitation, confusion and hallucinations.     OBJECTIVE:  Vitals:    07/02/21 1126   BP: 123/83   Pulse: 65   Resp: 18   Temp: 97.3 °F (36.3 °C)   Weight: 76.2 kg (168 lb)   Height: 165.1 cm (65\")   PainSc: 0-No pain     Temp 97.5   Pulse 64  RR 18  /92  Height 165.1 cm   Weight 81.6 kg   BSA 1.89  BMI 30    ECOG  (1) Restricted in physically strenuous activity, ambulatory and able to do work of light nature    Physical Exam   Constitutional: She is oriented to person, place, and time. No distress.   Obese    HENT:   Head: Normocephalic and atraumatic.   Mouth/Throat: Mucous membranes are moist.   Eyes: Conjunctivae are normal. Right eye exhibits no discharge. Left eye exhibits no discharge. No scleral icterus.   Neck: No thyromegaly present.   Cardiovascular: Normal rate, regular rhythm and normal heart sounds. Exam reveals no gallop and no friction rub.   Pulmonary/Chest: Effort normal. No stridor. No respiratory distress. She has no wheezes.   Left chest wall port    Abdominal: Soft. Bowel sounds are normal. She exhibits no mass. There is no abdominal tenderness. There is no rebound and no guarding.   Musculoskeletal: Normal range of motion. No tenderness.   Lymphadenopathy:     She has no cervical adenopathy.   Neurological: She is alert and oriented to person, place, and time. She exhibits normal muscle tone. "   Skin: Skin is warm and dry. She is not diaphoretic. No erythema.   Psychiatric: Her behavior is normal. Mood normal.   Nursing note and vitals reviewed.    I have reexamined the patient and the results are consistent with the previously documented exam. Cindy Ball MD     RECENT LABS    WBC   Date Value Ref Range Status   07/02/2021 6.39 3.40 - 10.80 10*3/mm3 Final   07/05/2020 4.04 (L) 4.5 - 11.0 10*3/uL Final     RBC   Date Value Ref Range Status   07/02/2021 3.86 3.77 - 5.28 10*6/mm3 Final   07/05/2020 3.68 (L) 4.0 - 5.2 10*6/uL Final     Hemoglobin   Date Value Ref Range Status   07/02/2021 10.8 (L) 12.0 - 15.9 g/dL Final   07/05/2020 11.5 (L) 12.0 - 16.0 g/dL Final     Hematocrit   Date Value Ref Range Status   07/02/2021 35.6 34.0 - 46.6 % Final   07/05/2020 35.3 (L) 36.0 - 46.0 % Final     MCV   Date Value Ref Range Status   07/02/2021 92.2 79.0 - 97.0 fL Final   07/05/2020 95.9 80.0 - 100.0 fL Final     MCH   Date Value Ref Range Status   07/02/2021 28.0 26.6 - 33.0 pg Final   07/05/2020 31.3 26.0 - 34.0 pg Final     MCHC   Date Value Ref Range Status   07/02/2021 30.3 (L) 31.5 - 35.7 g/dL Final   07/05/2020 32.6 31.0 - 37.0 g/dL Final     RDW   Date Value Ref Range Status   07/02/2021 18.9 (H) 12.3 - 15.4 % Final   07/05/2020 15.9 12.0 - 16.8 % Final     RDW-SD   Date Value Ref Range Status   07/02/2021 62.3 (H) 37.0 - 54.0 fl Final     MPV   Date Value Ref Range Status   07/02/2021 11.4 6.0 - 12.0 fL Final   07/05/2020 10.2 6.7 - 10.8 fL Final     Platelets   Date Value Ref Range Status   07/02/2021 181 140 - 450 10*3/mm3 Final   07/05/2020 158 140 - 440 10*3/uL Final     Neutrophil Rel %   Date Value Ref Range Status   07/05/2020 54.0 45 - 80 % Final     Neutrophil %   Date Value Ref Range Status   07/02/2021 62.5 42.7 - 76.0 % Final     Lymphocyte Rel %   Date Value Ref Range Status   07/05/2020 30.4 15 - 50 % Final     Lymphocyte %   Date Value Ref Range Status   07/02/2021 19.6 19.6 - 45.3  % Final     Monocyte Rel %   Date Value Ref Range Status   07/05/2020 12.4 0 - 15 % Final     Monocyte %   Date Value Ref Range Status   07/02/2021 16.9 (H) 5.0 - 12.0 % Final     Eosinophil %   Date Value Ref Range Status   07/02/2021 0.5 0.3 - 6.2 % Final   07/05/2020 3.0 0 - 7 % Final     Basophil Rel %   Date Value Ref Range Status   07/05/2020 0.2 0 - 2 % Final     Basophil %   Date Value Ref Range Status   07/02/2021 0.5 0.0 - 1.5 % Final     Immature Grans %   Date Value Ref Range Status   07/05/2020 0.0 0 % Final     Neutrophils Absolute   Date Value Ref Range Status   07/05/2020 2.18 2.0 - 8.8 10*3/uL Final     Neutrophils, Absolute   Date Value Ref Range Status   07/02/2021 4.00 1.70 - 7.00 10*3/mm3 Final     Lymphocytes Absolute   Date Value Ref Range Status   07/05/2020 1.23 0.7 - 5.5 10*3/uL Final     Lymphocytes, Absolute   Date Value Ref Range Status   07/02/2021 1.25 0.70 - 3.10 10*3/mm3 Final     Monocytes Absolute   Date Value Ref Range Status   07/05/2020 0.50 0.0 - 1.7 10*3/uL Final     Monocytes, Absolute   Date Value Ref Range Status   07/02/2021 1.08 (H) 0.10 - 0.90 10*3/mm3 Final     Eosinophils Absolute   Date Value Ref Range Status   07/05/2020 0.12 0.0 - 0.8 10*3/uL Final     Eosinophils, Absolute   Date Value Ref Range Status   07/02/2021 0.03 0.00 - 0.40 10*3/mm3 Final     Basophils Absolute   Date Value Ref Range Status   07/05/2020 0.01 0.0 - 0.2 10*3/uL Final     Basophils, Absolute   Date Value Ref Range Status   07/02/2021 0.03 0.00 - 0.20 10*3/mm3 Final     Immature Grans, Absolute   Date Value Ref Range Status   07/05/2020 0.00 <1 10*3/uL Final     nRBC   Date Value Ref Range Status   06/15/2021 0.0 0.0 - 0.2 /100 WBC Final       Lab Results   Component Value Date    GLUCOSE 141 (H) 06/15/2021    BUN 21 06/15/2021    CREATININE 0.90 06/15/2021    EGFRIFNONA 63 06/15/2021    EGFRIFAFRI >60 06/07/2019    BCR 23.3 06/15/2021    K 4.0 06/15/2021    CO2 23.0 06/15/2021    CALCIUM 8.4  (L) 06/15/2021    ALBUMIN 3.70 06/13/2021    LABIL2 1.3 07/03/2020    AST 15 06/13/2021    ALT 6 06/13/2021     ASSESSMENT:    1. 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  She was on carboplatinum, Doxil.  Patient had had carboplatinum Taxol, carbo Taxotere, Gemzar single agent, niraparib and was on Doxil and carboplatin.  Doxil was discontinued due to approaching of lifetime dosing.  Progressed on single agent topotecan ,Alimta and combination chemotherapy with Gemzar plus cis-platinum.  Patient also progressed on oral etoposide.  Refer to paradigm testing for future options at time of progression.  Patient has had progression of disease and therefore platinum was discontinued. On exam today I felt a right lower abdominal mass which is new.  She has CT scan of the chest, abdomen and pelvis on 3/1/2021 this showed moderate size pericardial effusion, scattered pulmonary nodules with septal thickening worse in the right lung compared to the left.  Pathologically enlarged hilar and mediastinal lymph nodes were suspicious for metastatic disease.  CT of the abdomen and pelvis showed increase in size of multiple soft tissue masses involving the anterior abdominal wall likely related to worsening of metastatic disease.  Increase in size of multiple sclerotic lesions within the lumbar spine and pelvis.  A 2D echocardiogram which did not reveal any significant pericardial effusion on 3/4/2021.  Recent CT scan of the chest suggested patient may have lymphangitic spread of malignancy.  Patient developed more pulmonary symptoms while on single agent oral etoposide.  Therefore this chemotherapy has been discontinued.  2. Reviewed the literature and other potential treatment options for her will include single agent vinorelbine, Xeloda, Cytoxan, oxaliplatin and aromatase inhibitors including letrozole and Faslodex.  She has a high tumor mutational burden or PD-L1  expression, immunotherapy could also be an option.  I have therefore recommended single agent vinorelbine.  Also will proceed with biopsying of the anterior abdominal mass to send tissue for foundation 1 testing.  Patient still has a distant performance status and is interested in pursuing treatment.    3. Patient has a subcutaneous nodule noted on the anterior abdominal wall consistent with metastatic disease currently measures 3.5 cm.  The anterior abdominal wall nodule has not significantly changed.  We will continue to monitor as the index cutaneous lesion.  4. Patient has bone metastases therefore I recommended Xgeva 120 mg subcu monthly.  Reviewed the side effects, benefits in detail with patient.  She would also be initiated on Os-Johnny D 600 mg twice a day.  She will continue Xgeva  5. Progressive brain metastasis: Status post stereotactic brain radiation under the care of Dr. Delong and recent MRI of the brain on 12/15/2020 shows probable progression.  Recent brain MRI did not show any obvious signs of progression.  6. B12 deficiency on parenteral B12 injections  7. History of DVT on anticoagulation therapy with warfarin:   8. Pancytopenia: Due to chemotherapy: On Procrit  9. Hypomagnesemia: Continue to monitor levels and infuse as needed  10. Iron deficiency anemia: Monitoring  11. Post hospital visit  12. Assessment has been reviewed and updated as documented above    Discussion:    Reviewed her CT scans with patient.  She has progressive disease.  Oral etoposide has been discontinued.  Vinorelbine has been recommended.  Reviewed the side effects in detail with patient to include but not limited to:  Chemotherapy side effects include, but not limited to, nausea, vomiting, bone marrow suppression, which can result in blood, platelet transfusion. There is also risk of permanent bone marrow destruction, which can cause myelodysplastic syndrome or leukemia years down the line. There is risk of infection which can  result in hospitalization and even death. There is also risk of fatigue, asthenia, alopecia which could become permanent. Chemo will help to reduce risk of relapse of cancer, but does not eliminate risk completely.    Vinorelbine can also lead to myalgias        PLANS:    1. Surgical referral for biopsy of anterior abdominal wall mass as soon as possible.  Please call surgical office to schedule to be seen on next Tuesday  2. Schedule patient to begin single agent chemotherapy with vinorelbine next week.  Ordered in [facility]  3. CA-125 today, CBC and CMP  4. Once cancer confirmed on the biopsy, send specimen for foundation 1 testing  5. Follow-up with me in 3 weeks  6. Continue supportive care  7. Continue Xgeva 120 mg subcu monthly  8. Continue calcium carbonate with Vitamin D 600 mg twice a day  9. Continue folic acid 1 mg p.o. daily -reviewed needs to stay on drug to prevent side effects from Alimta   10. Continue B12 injections 1000 mcg IM monthly, next due 1/14/2021  11. Status post IV Injectafer  12. Continue magnesium oxide 400 mg three times a day and weekly IV replacement as needed.   13. Continue Retacrit 40,000 units subcu weekly for hemoglobin less than 10, for chemotherapy-induced anemia  14. Continue supportive medications  15. MRI brain reviewed  16. All questions answered     I have reviewed labs results, imaging, vitals, and medications with the patient today.   Lab Results   Component Value Date    WBC 6.39 07/02/2021    HGB 10.8 (L) 07/02/2021    HCT 35.6 07/02/2021    MCV 92.2 07/02/2021     07/02/2021     Patient verbalized understanding and is in agreement of the above plans.        I spent 40 total minutes, face-to-face, caring for Tahira today.  90% of this time involved counseling and/or coordination of care as documented within this note.

## 2021-07-02 NOTE — TELEPHONE ENCOUNTER
Pt here today for visit with Dr Ball. INR = 1.20. Pt reports holding warfarin for last week due to eye procedure. Informed Dr Ball of INR and order received to continue to hold warfarin. Pt is going to have skin nodule biopsy next week.

## 2021-07-02 NOTE — PROGRESS NOTES
"Subjective   Tahira Zimmerman is a 65 y.o. female.     neck and shoulder pain, all over aching \"bone\" due to chemo--also left rotator cuff tear also broke out with shingles-under breast on back and on head. 9/10 at worst, 2/10 at best. Nonradiating. Always present, varies, interferes with daily activities. Imaging shows metastatic disease. Lengthy prior notes reviwed, treated for metastatic disease, failed Tramadol, cannot tolerate Hydrocodone, could tolerate Oxycodone 5mg, taking BID, also Lyrica 75mg BID. Started new chemo, worsening pain in b/l hips and legs, especially at night. Now taking Oxycodone 10mg TID prn and Lyrica 100mg BID with good relief. Started another new chemo with worsening pain, on a break while insurance approves pill form with improved pain. New chemo pill lowered Mg, holding it while getting Mg infusions. Shingles outbreak. Started another new chemo, has aches controlled by current meds. Had fall, 2 \"somethings\" found on her brain on imaging. Holding chemo with PNA, on ABX.       The following portions of the patient's history were reviewed and updated as appropriate: allergies, current medications, past family history, past medical history, past social history, past surgical history and problem list.    Review of Systems   Constitutional: Negative for chills, fatigue and fever.   HENT: Positive for hearing loss. Negative for trouble swallowing.    Eyes: Negative for visual disturbance.   Respiratory: Negative for shortness of breath.    Cardiovascular: Negative for chest pain.   Gastrointestinal: Positive for abdominal pain and nausea. Negative for constipation, diarrhea and vomiting.   Genitourinary: Negative for urinary incontinence.   Musculoskeletal: Positive for arthralgias, back pain and neck pain. Negative for joint swelling and myalgias.   Neurological: Positive for headache. Negative for dizziness, weakness and numbness.       Objective   Physical Exam   Constitutional: She is " oriented to person, place, and time. She appears well-developed and well-nourished.   Neurological: She is alert and oriented to person, place, and time. She has normal reflexes. She displays normal reflexes.   Psychiatric: Her behavior is normal. Mood, judgment and thought content normal.         Assessment/Plan   Diagnoses and all orders for this visit:    1. Pain of metastatic malignancy (Primary)    2. Cervical disc disorder with radiculopathy    3. Fibromyositis    4. Leg pain, bilateral    5. Neck pain    6. Other long term (current) drug therapy        INspect reviewed, in orderLow risk. Repeat drug screen 1/12/21 in order.  Increased to Oxycodone 10mg 1/2 - 1 tab q4h prn, increase to Oxycodone 15mg q4h prn, cannot tolerate Hydrocodone, failed Tramadol. May need to increase in future.  Increased to Lyrica 100mg TID for worsening pain. More effective than Gabapentin, already taking antidepressants so no plans for beginning Cymbalta.  Cont RxAlt shingles cream prn.  Cont other meds as prescribed.  No plans for procedures or TENS with metastatic disease.  RTC 3 months for f/u. Telephone visit, spent 5 minutes discussing her worsening pain, plan of care.

## 2021-07-06 NOTE — PROGRESS NOTES
INR today was at 1.2 increased patient to 6mg daily and advised to recheck tomorrow.  Patient VU and confirmed she had the correct amount of warfarin for dose.

## 2021-07-07 NOTE — PROGRESS NOTES
[facility] Note: Etoposide    Etoposide discontinued due to progression.  [facility] will no longer follow. If oral treatment is appropriate for future treatment goals, please consult pharmacy.  Thanks,    Nancy Haro, PharmD

## 2021-07-07 NOTE — PROGRESS NOTES
"FOLLOW-UP NOTE    Name: Tahira Zimmerman  YOB: 1955  MRN #: [id number]  Date of Service: 7/7/2021  Primary Care Provider: Noemy Queen MD    DIAGNOSIS: Stage IV Ovarian  1. Brain metastases (CMS/HCC)      REASON FOR VISIT: Ovarian cancer (brain mets) follow-up     RADIATION TREATMENT COURSE:The patient is a 65 y.o. year old female who completed 27 Gy in 3 fractions on 10/28/2020 for an enlarging left parietal cortex lesion ([id number]) and a left superior cerebellar lesion ([id number] mm).      HISTORY OF PRESENT ILLNESS: The patient is a 65 y.o. year old female who follows with us and med/onc for her stage IV ovarian cancer.  She has recently progressed in the chest/bones (extensive).  She is symptomatic from lymphangitic spread in the lungs and will be starting Vinorelbine single agent.    States breathing is poor, \"any bit of exertion and I'm gasping for air but not requiring oxygen\".  States dry cough is slightly getting worse.    She denies any bone pain other than cervical neck/across shoulder blades but feels that it is muscular.  She states that resting makes the pain better.  On a scale of 1 to 10, she rates it as an 8 until rest/pain meds relieves it to around a 1.    She states that she has a headache that starts in the right posterior neck/skullbase that radiates to the side and top of the right head.    MRI brain 7/6/2021 is discussed with her and Dr. Ball and the patient is showed the 3 lesions noted in the brain.  Two lesions, have been treated and are largely unchanged with slight, 1-2mm, potential enlargement, radiology felt less cystic and the left cerebellar hemisphere has some vasogenic edema. The treated left cerebellar and left parietal are similar size.  A 5 mm left frontal vertex lesion has grown from 4 x 3 mm and is suspicious for new met--this area is asymptomatic.      The following portions of the patient's history were reviewed and updated as appropriate: allergies, " current medications, past family history, past medical history, past social history, past surgical history and problem list. Reviewed with the patient and remain unchanged.    PAST MEDICAL HISTORY:  she  has a past medical history of Anemia (2013), Cervical disc disorder (2013), Clotting disorder (CMS/[facility]) (2013), Colon polyp (2013), Deep vein thrombosis (CMS/[facility]) (2013), Diverticulosis (2013), GERD (gastroesophageal reflux disease) (2016), HL (hearing loss) (2016), Hyperlipidemia (2013), Hypertension, Joint pain (2013), Low back pain (2013), Neuromuscular disorder (CMS/[facility]) (2015), Osteopenia (2014), Osteoporosis, Ovarian cancer (CMS/[facility]), Ovarian cancer on left (CMS/[facility]) (1995), Pneumonia (2010), Spinal stenosis (2013), and Urinary tract infection (2013).  MEDICATIONS:   Current Outpatient Medications:   •  acetaminophen (TYLENOL) 500 MG tablet, Take 1,000 mg by mouth Every 6 (Six) Hours As Needed for Mild Pain ., Disp: , Rfl:   •  albuterol sulfate  (90 Base) MCG/ACT inhaler, Inhale 2 puffs Every 4 (Four) Hours As Needed for Wheezing., Disp: 18 g, Rfl: 0  •  atorvastatin (LIPITOR) 20 MG tablet, Take 20 mg by mouth every night at bedtime., Disp: , Rfl: 3  •  calcium carbonate (Calcium 600) 600 MG tablet, Take 1 tablet by mouth 2 (two) times a day., Disp: 60 tablet, Rfl: 11  •  cetirizine (zyrTEC) 10 MG tablet, Take 1 tablet by mouth Every Night., Disp: 30 tablet, Rfl: 0  •  cyanocobalamin 1000 MCG/ML injection, Inject 1,000 mcg into the appropriate muscle as directed by prescriber Every 28 (Twenty-Eight) Days., Disp: , Rfl:   •  dexamethasone (DECADRON) 4 MG tablet, Take 2 tablets in the morning daily on days 2, 3 & 4.  Take with food., Disp: 6 tablet, Rfl: 5  •  famotidine (PEPCID) 40 MG tablet, Take 1 tablet by mouth Every Morning Before Breakfast., Disp: 90 tablet, Rfl: 1  •  folic acid (FOLVITE) 1 MG tablet, Take 1,000 mcg by mouth Daily., Disp: , Rfl:   •  HYDROcodone-homatropine (HYCODAN) 5-1.5 MG  tablet tablet, Take 1 tablet by mouth 2 (two) times a day., Disp: 60 tablet, Rfl: 0  •  MAGnesium-Oxide 400 (241.3 Mg) MG tablet tablet, Take 400 mg by mouth 2 (Two) Times a Day., Disp: , Rfl:   •  montelukast (SINGULAIR) 10 MG tablet, TAKE 1 TABLET BY MOUTH EVERY NIGHT, Disp: 30 tablet, Rfl: 2  •  ondansetron (ZOFRAN) 8 MG tablet, Take 1 tablet by mouth 3 (Three) Times a Day As Needed for Nausea or Vomiting., Disp: 30 tablet, Rfl: 5  •  potassium chloride (K-DUR,KLOR-CON) 20 MEQ CR tablet, Take 2 tablets by mouth 3 (Three) Times a Day., Disp: 120 tablet, Rfl: 3  •  pregabalin (LYRICA) 100 MG capsule, Take 1 capsule by mouth 3 (Three) Times a Day., Disp: 90 capsule, Rfl: 5  •  sertraline (ZOLOFT) 50 MG tablet, Take 50 mg by mouth Every Night., Disp: , Rfl:   •  temazepam (RESTORIL) 30 MG capsule, Take 1 capsule by mouth At Night As Needed for Sleep., Disp: 30 capsule, Rfl: 1  •  triamterene-hydrochlorothiazide (DYAZIDE) 37.5-25 MG per capsule, TAKE 1 CAPSULE BY MOUTH EVERY DAY, Disp: 90 capsule, Rfl: 1  •  warfarin (COUMADIN) 1 MG tablet, Take 1 tablet by mouth Daily. At 1700 as directed, Disp: 30 tablet, Rfl: 3  •  oxyCODONE (ROXICODONE) 15 MG immediate release tablet, Take 1 tablet by mouth Every 4 (Four) Hours As Needed for Moderate Pain ., Disp: 180 tablet, Rfl: 0    Current Facility-Administered Medications:   •  theophylline (PEG-24) 24 hr capsule 300 mg, 300 mg, Oral, Q24H, Jerica Esparza MD  ALLERGIES:   Allergies   Allergen Reactions   • Morphine Nausea Only and Hives   • Penicillin V Potassium Rash   • Sulfa Antibiotics Rash   • Levaquin [Levofloxacin] Nausea Only and Dizziness     When taken with Coumadin   • Amoxicillin Rash     PAST SURGICAL HISTORY: she has a past surgical history that includes Hysterectomy; Hernia repair; Colon surgery; Colonoscopy (05/26/2018); Exploratory laparotomy; Oophorectomy; Cataract extraction; Bronchoscopy (N/A, 2/10/2021); and Bronchoscopy (N/A, 6/15/2021).  PREVIOUS  RADIOTHERAPY OR CHEMOTHERAPY: yes  FAMILY HISTORY: her family history includes Cancer in her father; Hypertension in her brother, father, mother, and sister; Stroke in her brother.  SOCIAL HISTORY: she  reports that she has never smoked. She has never used smokeless tobacco. She reports that she does not drink alcohol and does not use drugs.  PAIN AND PAIN MANAGEMENT: no pain.  Vitals:    07/07/21 0841   BP: 129/77   Pulse: 65   Resp: 18   Temp: 97.4 °F (36.3 °C)   TempSrc: Oral   SpO2: 97%   Weight: 76.7 kg (169 lb)   PainSc: 0-No pain     NUTRITIONAL STATUS:  No issues  KPS: 70:  Cares for self; unable to carry on nomal activity    Review of Systems:   Review of Systems   Gastrointestinal:        Mass noted to LLQ, pain to palpation, progressively enlarging over 6+ months   Musculoskeletal: Positive for gait problem.   Neurological: Positive for headaches.   Psychiatric/Behavioral: Positive for agitation. The patient is nervous/anxious.    Neck pain.  General: No fevers, chills, weight change, or drenching night sweats.   Respiratory System: Significant issues.  Musculoskeletal: + Neck pain    Objective     Vitals:  Vitals:    07/07/21 0841   BP: 129/77   Pulse: 65   Resp: 18   Temp: 97.4 °F (36.3 °C)   TempSrc: Oral   SpO2: 97%   Weight: 76.7 kg (169 lb)   PainSc: 0-No pain       PHYSICAL EXAM:  GENERAL: in no apparent distress, sitting comfortably in room.    HEENT: normocephalic, atraumatic. Pupils are equal, round, reactive to light. Sclera anicteric. Conjunctiva not injected. Oropharynx without erythema, ulcerations or thrush.   NECK: Supple with no masses. + neck pain  LYMPHATIC: no cervical, supraclavicular or axillary adenopathy appreciated bilaterally.   CARDIOVASCULAR: S1 & S2 detected; no murmurs, rubs or gallops.  CHEST: decreased breath sounds bilaterally.  ABDOMEN: + soft tissue masses, non-painful on exam.  MUSCULOSKELETAL: no tenderness to palpation along the spine or scapulae. Normal range of  motion.  EXTREMITIES: no clubbing, cyanosis, edema.  SKIN: no erythema, rashes, ulcerations noted.   NEUROLOGIC: cranial nerves II-XII grossly intact bilaterally. No focal neurologic deficits.  PSYCHIATRIC:  alert, aware, and appropriate.    PERTINENT IMAGING/PATHOLOGY/LABS (Medical Decision Making):     COORDINATION OF CARE: A copy of this note is sent to the referring provider.    PATHOLOGY (Reviewed):     IMAGING (Reviewed): MRI brain noted above. Discussed with patient and Dr. Ball.    LABS (Reviewed):  Hematology WBC   Date Value Ref Range Status   07/02/2021 6.39 3.40 - 10.80 10*3/mm3 Final   07/05/2020 4.04 (L) 4.5 - 11.0 10*3/uL Final     RBC   Date Value Ref Range Status   07/02/2021 3.86 3.77 - 5.28 10*6/mm3 Final   07/05/2020 3.68 (L) 4.0 - 5.2 10*6/uL Final     Hemoglobin   Date Value Ref Range Status   07/02/2021 10.8 (L) 12.0 - 15.9 g/dL Final   07/05/2020 11.5 (L) 12.0 - 16.0 g/dL Final     Hematocrit   Date Value Ref Range Status   07/02/2021 35.6 34.0 - 46.6 % Final   07/05/2020 35.3 (L) 36.0 - 46.0 % Final     Platelets   Date Value Ref Range Status   07/02/2021 181 140 - 450 10*3/mm3 Final   07/05/2020 158 140 - 440 10*3/uL Final      Chemistry   Lab Results   Component Value Date    GLUCOSE 97 07/06/2021    BUN 16 07/06/2021    CREATININE 0.94 07/06/2021    EGFRIFNONA 60 (L) 07/06/2021    EGFRIFAFRI >60 06/07/2019    BCR 17.0 07/06/2021    K 4.4 07/06/2021    CO2 26.0 07/06/2021    CALCIUM 7.9 (L) 07/06/2021    ALBUMIN 3.40 (L) 07/06/2021    LABIL2 1.3 07/03/2020    AST 19 07/06/2021    ALT <5 07/06/2021         Assessment/Plan     ASSESSMENT AND PLAN:    1. Brain metastases (CMS/HCC)       -Asymptomatic 5 mm brain met has not seen treatment but very slow growth velocity over the 3-4 month when it was unclear if it was a met or not but continued growth has clarified over the interval.    -The two other treated mets show largely stability but some question as to treatment changes or residual  malignancy.  Testing such as MRI spec/perfusion was again discussed to look at these lesions.    Most worrisome is her neck pain and headache on the contralateral side from the brain mets. This is the biggest pain factor she has.  She has not had imaging of the cervical neck and has had clots as well.  For this reason, I believe a CT neck with contrast will help sort out the pain.    Her biggest clinical factor is her lung status and concern for worsening lung mets, suggestive of lymphangitic carcinomatosis.  She is to start chemo again on Friday and this needs to start.  Dr. Ball has discussed a walk test for 02 and this  Will need to be performed.    We are getting the CT neck.  Discussed SRS, observation and whole brain radiation therapy for the brain in some detail today.  Patient is favoring observation but wants to consider SRS.  She is hopeful for relief with chemo and quality of life improvement.  She had considered comfort measures before settling on systemic therapy trial.    This assessment comes from my review of the imaging, pathology, physician notes and other pertinent information as mentioned.    DISPOSITION: CT neck and f/u tomorrow.  Patient would want palliative XRT to the neck if indicated and if indicated would probably want SRS to the 5 mm lesion.  A lot is pending.    TIME SPENT WITH PATIENT:   I spent 32 minutes caring for Tahira on this date of service. This time includes time spent by me in the following activities: preparing for the visit, reviewing tests, obtaining and/or reviewing a separately obtained history, performing a medically appropriate examination and/or evaluation, counseling and educating the patient/family/caregiver, referring and communicating with other health care professionals, documenting information in the medical record and care coordination        CC: MD Partha Espinoza MD  7/7/2021  8:53 AM EDT

## 2021-07-08 NOTE — TELEPHONE ENCOUNTER
Called pt to let her know that her calcium was low and that Dr. Ball has ordered Os-Johnny D 600. No answer or identifying VM. Callback number left.

## 2021-07-09 NOTE — TELEPHONE ENCOUNTER
Received critical lab on patient of Phos level  1.4.  Received order form DR Ball to call in Neutraphos 1 tab BID called into pharmacy.  Could not get io touch with patient LVM

## 2021-07-09 NOTE — PROGRESS NOTES
"Arabella Zimmerman is a 65 y.o. female.   Chief Complaint   Patient presents with   • New Patient     Ref Dr. Ball, Abd Mass Needs Bx     /67 (BP Location: Right arm, Patient Position: Sitting, Cuff Size: Adult)   Pulse 80   Temp 98.2 °F (36.8 °C) (Infrared)   Resp 18   Ht 165.1 cm (65\")   Wt 76 kg (167 lb 9.6 oz)   LMP  (LMP Unknown)   SpO2 97%   BMI 27.89 kg/m²     HISTORY OF PRESENT ILLNESS:  65-year-old lady who has a history of metastatic ovarian cancer with progression of disease including growth of the subcutaneous nodules in her anterior abdominal wall, she has metastatic disease to the brain and to her bones.  She has been managed by oncology and has been seen by radiation oncology.  After her last office visit her oncologist would like a sample of the subcutaneous nodules.  She has been sent over to see if I would be willing to do the biopsy.      Outpatient Encounter Medications as of 7/9/2021   Medication Sig Dispense Refill   • acetaminophen (TYLENOL) 500 MG tablet Take 1,000 mg by mouth Every 6 (Six) Hours As Needed for Mild Pain .     • albuterol sulfate  (90 Base) MCG/ACT inhaler Inhale 2 puffs Every 4 (Four) Hours As Needed for Wheezing. 18 g 0   • atorvastatin (LIPITOR) 20 MG tablet Take 20 mg by mouth every night at bedtime.  3   • calcium carbonate (Calcium 600) 600 MG tablet Take 1 tablet by mouth 2 (two) times a day. 60 tablet 11   • calcium carbonate-vitamin d 600-400 MG-UNIT per tablet Take 1 tablet by mouth 2 (Two) Times a Day. 60 tablet 3   • cetirizine (zyrTEC) 10 MG tablet Take 1 tablet by mouth Every Night. 30 tablet 0   • cyanocobalamin 1000 MCG/ML injection Inject 1,000 mcg into the appropriate muscle as directed by prescriber Every 28 (Twenty-Eight) Days.     • dexamethasone (DECADRON) 4 MG tablet Take 2 tablets in the morning daily on days 2, 3 & 4.  Take with food. 6 tablet 5   • famotidine (PEPCID) 40 MG tablet Take 1 tablet by mouth Every " Morning Before Breakfast. 90 tablet 1   • folic acid (FOLVITE) 1 MG tablet Take 1,000 mcg by mouth Daily.     • HYDROcodone-homatropine (HYCODAN) 5-1.5 MG tablet tablet Take 1 tablet by mouth 2 (two) times a day. 60 tablet 0   • MAGnesium-Oxide 400 (241.3 Mg) MG tablet tablet Take 400 mg by mouth 2 (Two) Times a Day.     • montelukast (SINGULAIR) 10 MG tablet TAKE 1 TABLET BY MOUTH EVERY NIGHT 30 tablet 2   • ondansetron (ZOFRAN) 8 MG tablet Take 1 tablet by mouth 3 (Three) Times a Day As Needed for Nausea or Vomiting. 30 tablet 5   • oxyCODONE (ROXICODONE) 15 MG immediate release tablet Take 1 tablet by mouth Every 4 (Four) Hours As Needed for Moderate Pain . 180 tablet 0   • potassium chloride (K-DUR,KLOR-CON) 20 MEQ CR tablet Take 2 tablets by mouth 3 (Three) Times a Day. 120 tablet 3   • pregabalin (LYRICA) 100 MG capsule Take 1 capsule by mouth 3 (Three) Times a Day. 90 capsule 5   • sertraline (ZOLOFT) 50 MG tablet Take 50 mg by mouth Every Night.     • temazepam (RESTORIL) 30 MG capsule Take 1 capsule by mouth At Night As Needed for Sleep. 30 capsule 1   • triamterene-hydrochlorothiazide (DYAZIDE) 37.5-25 MG per capsule TAKE 1 CAPSULE BY MOUTH EVERY DAY 90 capsule 1   • warfarin (COUMADIN) 1 MG tablet Take 1 tablet by mouth Daily. At 1700 as directed 30 tablet 3     Facility-Administered Encounter Medications as of 7/9/2021   Medication Dose Route Frequency Provider Last Rate Last Admin   • [COMPLETED] iopamidol (ISOVUE-370) 76 % injection 100 mL  100 mL Intravenous Once in imaging Partha Delong MD   100 mL at 07/08/21 1352   • theophylline (PEG-24) 24 hr capsule 300 mg  300 mg Oral Q24H Jerica Esparza MD             The following portions of the patient's history were reviewed and updated as appropriate: allergies, current medications, past family history, past medical history, past social history, past surgical history and problem list.    Review of Systems  Complete review of systems has been obtained is  positive for cough shortness of breath wheezing abdominal pain neck pain the remainder of the review of systems is negative.  Objective   Physical Exam  Constitutional:       General: She is not in acute distress.  HENT:      Head: Normocephalic and atraumatic.   Eyes:      General: No scleral icterus.     Conjunctiva/sclera: Conjunctivae normal.   Abdominal:      General: There is no distension.      Palpations: Abdomen is soft.      Comments: Healed incisions from previous surgeries.  She has multiple ventral hernias.  Adjacent to these hernias are subcutaneous nodules which are firm and fixed.  Just below the umbilicus there is one that is having some skin changes so seems to be very superficial and is likely away from the hernia contents   Skin:     General: Skin is warm and dry.   Neurological:      Mental Status: She is alert.   Psychiatric:         Mood and Affect: Mood normal.         Behavior: Behavior normal.           Assessment/Plan   Diagnoses and all orders for this visit:    1. Malignant neoplasm of right ovary (CMS/HCC) (Primary)    Patient is been sent over for a biopsy of the abdominal wall subcutaneous nodules.  Since 1 of these appears to be fairly superficial and away from the bowel we will go ahead and try to do a incisional biopsy.  I talked to her about the risks and benefits of this procedure including the risk of bleeding.  If additional tissue will be needed or the sample is inadequate biopsy would likely need to be done in the operating room if it is absolutely necessary.  There is certainly some risk given that the seeding likely was from the intra-abdominal space to the subcutaneous tissue along with these hernias.    Patient was placed on the procedure table in the supine position.  The abdomen was prepped and draped normal sterile fashion.  A verbal consent was obtained.  The skin overlying the subcutaneous nodule in the right mid abdomen was infiltrated with 1% lidocaine with  epinephrine.  A 15 blade scalpel was used to incise the skin and once the underlying subcutaneous nodule was exposed a 3 mm punch biopsy forcep was used to obtain 2 core samples.  Bleeding was stopped with direct pressure.  A 3-0 Vicryl suture was used to close the skin and to help with hemostasis.  A dressing was then placed over the incision.  She tolerated procedure well.      Jaycob Max MD  7/9/2021  12:07 PM EDT    This note was created using Dragon Voice Recognition software.

## 2021-07-09 NOTE — PROGRESS NOTES
Radiation Oncology note  Dos: 07/08/2021    Reviewed her CT neck imaging with her and positive for mets but no instability or destructive findings.     Discussed whole brain radiation therapy, SRS, observation +/- palliaitve neck radiation therapy again as done on 07/07/2021.  Patient wants to start systemic therapy and evaluate her respond.  The CT neck did again show the worrisome findings of lymphangitic spread in the right lung.  Discussed with Dr. Ball and I will order an MRI brain in 2 months.  I will also remain available over the next few weeks if her neck pain becomes more focal or worsens for potential palliative radiation therapy.    Patient well informed, questions answered and chemotherapy starting tomorrow.  Patient had considered comfort measures only but wants to monitor progress/breathing with new chemo.    Approved by:     Partha Delong MD  07/09/21  12:35 EDT

## 2021-07-09 NOTE — PROGRESS NOTES
Patient came in today for her first navelbine without complaints. Patient educated on navelbine and given [facility] education sheet. Consent signed, placed in to be scanned folder. Treatment tolerated. Since calcium is low Dr. ball would like to hold xgeva. Orders for a STAT cmp to be drawn Friday at her infusion to determine if patient can get xgeva then. Patient has already increased calcium this week, has only had two doses. Patient verbalized understanding.     Patient has been holding her coumadin for four days due to a biopsy she had today. Wednesday at her INR appt she did not tell the nurse. So Diandra had told her to increase but she never did because she was holding it. Per Dr. Ball recheck INR next Tuesday and patient okay to resume coumadin today. Patient started back on her 6mg. Appts given, patient denies further needs today.

## 2021-07-26 NOTE — PROGRESS NOTES
Pt here for port flush, lab collection and MD visit. Using sterile technique, port accessed for blood collection. 10 ml blood wasted prior to blood for labs being collected.

## 2021-07-26 NOTE — PROGRESS NOTES
Hematology/Oncology Outpatient Follow Up    PATIENT NAME:Tahira Zimmerman  :1955  MRN: [id number]  PRIMARY CARE PHYSICIAN: Noemy Queen MD  REFERRING PHYSICIAN: Noemy Queen MD    Chief Complaint   Patient presents with   • Follow-up     Malignant neoplasm of right ovary      HISTORY OF PRESENT ILLNESS:     1. Recurrent ovarian cancer diagnosis established in 2013.    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  The patient was treated with surgery and then postoperatively with adjuvant chemotherapy consisting of carboplatin and Taxol.  Six months later, she had recurrence of disease intra-abdominally between the rectum and vagina, which was treated with intraabdominal peritoneal chemotherapy.  She had been free of disease since that time.  She reported feeling a mass in the left side of her abdomen approximately six months ago.  This gradually grew and in early 2013 she had her daughter feel the mass.  The daughter works for Dr. David Rogers and the patient at that time also complained of intermittent rectal bleeding.  Consultation with Dr. David Rogers was obtained.  The patient had a CT scan of the abdomen and pelvis performed on 13 revealing left lower quadrant mass measuring 9.7 x 9.3 x 6 cm demonstrating areas of dense calcification, soft tissue density and cystic density within it.  Stromal tumor is felt to be most likely a possibly fibroma.  It is generated with necrosis and calcification.  Possible palpable mass in the rectus muscle is seen with calcification and deformity of the rectus muscle and just below this a second mass intra-abdominally was seen measuring 2 cm in the greatest diameter suspicious for second intraabdominal tumor.  The mass separate from the largest mass measuring 2.6 cm in the dependent portion of pelvis is seen on the right of the midline.  No  retroperitoneal lymphadenopathy was seen.  No free air, free fluid or other abnormality was present.  The patient was scheduled for an outpatient colonoscopy, but had syncopal episode while sitting in the toilet the night before and was brought to the emergency room early in the morning by her daughter and son-in-law.  The patient had apparently stopped breathing for a few seconds and did not have a pulse, but started breathing before CPR could be initiated.  In the emergency room, the patient had an EKG revealing sinus rhythm nonspecific T-wave flattening; metabolic panel revealed a glucose of 148, creatinine of 1.3, CBC was normal.  She was treated with intravenous fluid.  The patient’s case was then discussed with Dr. Anabell Monique and patient was subsequently admitted to [facility].  The patient underwent a colonoscopy by Dr. David Rogers on 06/27/13 revealing sigmoid diverticulosis and grade II internal and external hemorrhoids, but no mass or colitis was seen.  CT-guided biopsy of the mass was performed on 06/27/13 as well revealing metastatic papillary adenocarcinoma with numerous psammoma bodies.  Pathology comment stated prior records were not available; however, with history of ovarian cancer the current biopsy would be consistent with metastases from that malignancy.  Oncology consult was obtained and I initially saw the patient on 06/27/2013 where the patient had claimed to have little bit of pain in the area of disease.  She reported being frequently constipated, denies any weight loss or fevers.  She did report having some night sweats, but thought that was because of her menopause.  On 06/27/13 metastatic workup including labs and CT scans were initiated.  CT scan of the chest on 06/27/13 revealed no acute disease in the chest.  A 1.4 cm size focal area of decreased density was noted in the lateral aspect of the right lobe of the liver consistent with a benign cyst or hemangioma.   There was a very small hiatal hernia measuring 2.2 cm in diameter.  A CT scan of the head performed 06/27/13 revealed no acute intracranial abnormality noted.  CA-125 was 40 units/ml (0-35), CA 19-9 was 11.8 units/ml (0-35), CEA level was 0.8 ng/ml (0-3), TSH level was 1.09 IU/ml (0.34-5.6).  The patient was in workup for the syncopal episode, a carotid Doppler was performed on June 28 revealing an essentially normal study showing a reduction in diameter from 0-15% bilaterally.  A Holter monitor was completed on 06/27/13, which was unremarkable except for a few premature atrial contractions.  The patient was felt stable and subsequently was discharged home on 06/28/13.  Post discharge, the patient was seen at [facility] on 07/05/13 by Dr. Kathryn Thrasher who discussed treatment options with the patient including surgery.  The patient is in the office today 07/16/13 in follow up post hospitalization.  She is reporting that surgery is planned for July 30th of this month at .  · 7/30/13 to 8/3/13 - The patient was in [facility].  The patient was admitted for surgery for her recurrent ovarian cancer.  The patient had exploratory laparotomy, omentectomy, bowel resection, liver biopsy and appendectomy.  Pathology revealed of the omentum metastatic serous carcinoma of the transverse colon, segmental resection showed metastatic serous carcinoma involving mesenteric fat.  The liver wedge resection showed fibrosclerotic thickening of the hepatic capsule.  Benign liver parenchyma.  No evidence of metastatic tumor.  Appendix showed fibrous obliteration of the lumen.  Negative for metastatic carcinoma.  Rectum and sigmoid colon segmental resection showed metastatic serous carcinoma invading the colorectal mucosa uninvolved by tumor.  Vaginal mass showed metastatic serous carcinoma.  Margins are positive for tumor.  · 9/24/13 - Chemotherapy orders were written for patient to start  carboplatin 550 mg IV day one and Taxol 330 mg IV day one to be cycled every three weeks.  · 10/10/13 - The patient started cycle #1 of chemotherapy consisting of Carboplatin and Taxol.  · 09/25/13 -  is 10.6 normal.  · 10/01/13 - CT of the chest, abdomen and pelvis revealed new reticular nodular occupancy in the anterior segment of the right upper lobe, could reflect an inflammation or infectious etiology or could reflect unusual persistence of metastatic tumor.  New liver lesions, the smaller one appears to be implant on the surface of the liver, the larger may also represent an implant including the intrahepatic.  Several small mesenteric nodes seen throughout the abdomen and pelvis.  · 10/02/13 - Port placed by Dr. Sen.  · 10/24/13 - Orders written to start Neupogen daily and if more than three Neupogen are needed to give Neulasta after next chemo.  ANC is 0.15.  · 10/31/14 - Patient given cycle 2 of chemotherapy with Taxol and Carboplatin.  · 11/21/13 - The patient started cycle 3 of chemotherapy consisting of Carboplatin and Taxol.  · 11/26/13 - CT scan of chest, abdomen and pelvis revealed no evidence of active disease in the chest.  Reticulonodular opacity has resolved.  Metastatic lesion impressing upon the hepatic dome similar to prior exam.  No evidence of a change.  No evidence of new disease.  Postsurgical changes in the pelvis and throughout the retroperitoneum in the large bowel.  Finding containing anterior abdominal wall hernia containing fat tissue and enhancing vessels, 3 cm in diameter.  · 12/2/13 - Orders written to start Neupogen per protocol as well as Aranesp due to low hemoglobin and ANC.  ANC is 0.14.  Hemoglobin is 9.7.  · 12/11/13 - Orders written to hold chemotherapy until next week and decrease dose by 20% due to low platelet count.  Platelet count is 85,000.  · 12/16/13 - The patient received cycle 4 of Carboplatin and Taxol at a 20% dose reduction so Taxol dose is 260 mg and  Carboplatin is 440 mg.  · 12/16/13 - CA-125 12.6 (N).  · 12/19/13 - PET/CT scan.  Impression:  1. There is no evidence of hypermetabolism in the liver.  Specifically of the dominant lesion in or adjacent to the right hepatic dome that was seen and described on the recent CT study of 11/26/2013.  It is photopenic relative to the surrounding liver.  2.  There is no evidence of hypermetabolic soft issue mass lesion or free fluid in the abdomen or pelvis.  3.  The tonsillar tissues are slightly enlarged and have moderate activity level.  This maybe physiologic.  Correlation with oral cavity, examination is recommended.  4.  Mild amount of activity in the right level II jugular lymph chain without focally enlarged lymph nodes is of questionable significance.  · 1/6/13 - The patient received cycle 5 of chemotherapy with Taxol and Carboplatin.  · 1/27/14 - The patient started on cycle 6 of Taxol and Carboplatin.  · 2/18/14 - CT of the abdomen and pelvis with contrast; stable lesions impressing upon the hepatic dome, unchanged compared to the prior exam.  Multiple anterior abdominal wall hernias re-demonstrated.  Those above the umbilicus contain omental fat.  Below and to the left of the umbilicus as anterior abdominal hernia containing omental fat in nondilated small bowel which is larger than seen previously.  · 2/20/14 - The patient received cycle 7 of chemotherapy with Taxol and Carboplatin.   · 3/11/14 - Order written to discontinue chemotherapy due to patient has completed 7 cycles of chemotherapy and CT scans suggest possible remission.  · 3/11/14 -  11.0 (N).  · 06/05/14 - CT scan of the chest abdomen and pelvis with contrast: There are multiple anterior abdominal wall hernias.  There are 2 larger anterior abdominal wall hernias at the level of the transverse colon, which contain omental fat.  There are at least 2 small anterior abdominal wall hernias that contain omental fat.  There is a moderate sized  anterior abdominal wall hernia at the level of the umbilicus, eccentric to the left, which contain non-dilated bowel.  Interval decrease in the size of two deposit impressing on the liver.  The third tiny deposit has been stable.  · 8/19/14 -  10.4 (N).  · 12/19/14 -   10.0 (N)  · 1/7/15 - CT chest, abdomen and pelvis with stable appearance of the chest with several micronodules. Small hiatal hernia, slightly larger than previous exam, increased from 1.5 to 2.5 cm in diameter. Bochdalek hernia unchanged. Multiple hepatic lesions. The two dominant lesions at the right hepatic dome had decreased in size from previous. The remaining tiny nodules measured 3-5 mm in diameter and were unchanged. There was no evidence of new intra-abdominal or pelvic mass or free fluid. There were multiple anterior abdominal wall hernias which had increased in size.   · 7/27/15 - Comprehensive metabolic panel with no significant abnormalities. CA-125 of 21 (0-35).   · 10/26/15 - WBC 6, hemoglobin 13, platelet count 204,000. Heart rate 47. CA-125 of 20 (0-35).    · 2/18/16 - CT chest with contrast with stable micronodular density seen in the lungs without significant change from prior exam. CT abdomen and pelvis with regression of liver lesions with no new evidence of metastasis. Multiple ventral hernias again noted without significant change from prior exam. Creatinine 0.9 (0.6-1.3).    · 2/25/16 - Patient hospitalized at [facility] between 2/25/16 and 2/27/16 with pyelonephritis secondary to E-coli. She was given two days of IV Rocephin and then placed on oral Levaquin. She was asked to hold her Coumadin, but then had nausea and vomiting because of Levaquin and stopped the Levaquin also. Her CA-125 was 32 (0-35) and CEA 1.3 (0-5). Her urine grew >100,000 E-coli. CT of the abdomen and pelvis was done which revealed 4.1 x 1.8 cm sized mild ovoid area of decreased density in the liver parenchyma along the anteromedial  aspect of the dome of the right lobe of the liver, unchanged significantly since the previous study of 2/18/16. There was suspicion of a very small pericardial effusion. There was suspicion of a small hiatal hernia. There were several hernias in the anterior abdominal wall. A 3.5 x 3.1 cm incised well-circumscribed abnormal density in the left retroperitoneal fatty soft tissue at the level of the left iliac crest was suggestive of tumor recurrence. There was an abnormal density in the left retroperitoneal soft tissues in the left flank that partially surrounded the left kidney and extended downward to the left pelvic area. Diverticulosis of the distal descending colon and sigmoid colon were seen.     · 3/8/16 - Patient prescribed Bactrim DS 1 p.o. b.i.d. for 10 days for pyelonephritis, which was partially treated with Rocephin and Levaquin. Urine with 40,000 E-coli treated with Macrobid for 7 days.  of 20 (0-35).    · 3/31/16 - PET scan with area of low density anteriorly in the right lobe of the liver with no significant radiopharmaceutical uptake to suggest malignancy. Multiple round soft tissue density masses showing increased radiopharmaceutical activity and multiple anterior abdominal wall hernias.   · 5/10/16 - Patient complains of venous enlargement of the left chest wall around the port. Has not been seen by  yet, but has an appointment on 5/23/16. Complained of a cough.   · 5/12/16 - Infusaport contrast study with no evidence of fibrin sheath. Chest x-ray with mild cardiac prominence.   · 5/23/16 - Patient seen in consultation by Sheng Bowman M.D. at [facility] and a chemotherapy trial recommended.   · 6/1/16 -   of 27.1 (0-35).    · 6/14/16 - WBC 5.71, hemoglobin 12.4, platelet count 188,000. Patient is scheduled for surgery at  on 6/16/16 after further discussion with  physicians. Discussed adjuvant chemotherapy.   · 6/16/16 - Excisional biopsy of abdominal wall mass  performed by Sheng Bowman M.D. at [facility] with pathology revealing metastatic high-grade papillary serous carcinoma.   · 7/7/16 - Received a call from the patient that Tramadol was not working and that she was given Norco #24 after her biopsy at  which did help her pain. Patient prescribed Norco 5/325 one p.o. q. 4-6 hours p.r.n. #60 with no refills. Echocardiogram with left ventricular inferior wall hypokinesis with ejection fraction of 50-55%.   · 7/8/16 - Patient seen in consultation by Sheng Bowman M.D. in consultation at  for metastatic high-grade papillary serous carcinoma diagnosed by excisional biopsy on 6/16/16 with carcinomatosis and patient not a surgical resection candidate. Genetic sequencing and susceptibility testing of tumor was ordered. Biopsy was done of anterior abdominal wall.   · 7/7/16 - Echocardiogram with left atrial enlargement. Mild mitral regurgitation. Left ventricular proximal inferior wall hypokinesis with ejection fraction of 50-55%.   · 7/11/16 - While waiting for genomics results, in order not to delay treatment further, order written for Taxotere 60 mg/M2 IV over one hour followed by Carboplatin AUC 5 over one hour, cycles to be repeated every three weeks.  Comprehensive metabolic panel normal.  26 (0-35).    · 725/16 - Pain contract signed for use of Norco.   · 8/5/16 - Patient stated that she experienced a red rash and was itchy post taking Norco. Norco discontinued and Tramadol refilled.   · 8/16/16 - Patient started cycle 1 chemotherapy. WBC 7.1, hemoglobin 11.2, MCV 88.7, platelet count 94,000. Patient complained of nausea for a week post chemo. Already getting Emend, Aloxi and Decadron. Added Omeprazole 40 mg daily orally.   · 8/17/16 - Urine culture with >100,000 E-coli.   · 8/25/16 - Cycle 2 chemotherapy given.   · 9/9/16 - Magnesium 1.3, replaced intravenously. Potassium 3.4 (3.6-5.1), increased oral potassium supplementation.    · 9/16/16 -  Cycle 3 chemotherapy given.   · 9/19/16 - Comprehensive metabolic panel with no significant abnormality.   · 9/20/16 - CT abdomen and pelvis with evidence of progressive metastatic disease in the abdomen and pelvis with interval enlargement of several soft tissue attenuations and subcutaneous nodules in the anterior abdominal wall. Interval enlargement of a left pelvic soft tissue attenuation mass. A lentiform area of low attenuation in the dome of the liver stable in size. Multiple anterior abdominal wall hernias containing fat and/or bowel. Marked pelvic floor laxity. CT chest negative for evidence of metastatic disease. Tiny pulmonary nodules in the right lung stable since 2013 consistent with a benign etiology.   · 9/23/16 - Macrobid 100 mg p.o. b.i.d. x7 days prescribed for UTI. Current chemotherapy discontinued after discussion and new chemotherapy orders written. Carboplatin AUC-5 IV day 1 and Doxil (Liposomal Doxorubicin) 30 mg/M2 IV day 1 to cycle q. 21 days.  of 18 (0-35). Magnesium 1.6, replaced intravenously. Comprehensive metabolic panel with potassium 3.4 (3.6-5.1).     · 10/13/16 - Patient started on cycle 1 of Carboplatin and Liposomal Doxorubicin followed by Neulasta.   · 11/3/16 - Cycle 2 Carbo and Doxil given.   · 11/17/16 - Magnesium 1.0, replaced intravenously. Oral potassium supplement increased.   · 11/29/16 - CT chest with small non-calcified right pulmonary nodules unchanged. Benign bone island in the midthoracic vertebral body along with benign bony hemangiomas in the middle thoracic vertebral bodies. CT abdomen and pelvis with mild progressive metastatic disease from CT of September 2016. Anterior abdominal wall mass superiorly mildly increased in size with new area noted as described measuring 3 x 1.7 cm. 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 Stable area of decreased uptake in the dome of the liver not hypermetabolic on recent PET  scan, likely representing cyst or focal fatty infiltration. Stable small hiatal hernia, fat-containing Bochdalek’s hernia and anterior abdominal wall hernias with stable pelvic floor relaxation.    · 12/1/16 - Cycle 3 chemotherapy given.   · 12/8/16 - Current chemotherapy discontinued and patient started on Gemcitabine 1000 mg/M2 IV days 1, 8, 15 q. 28 days.   · 12/22/16 - Patient seen in followup by Juliana Roy M.D. at the pain clinic, treated with Oxycodone and Lyrica. Transaminases normal. Patient started cycle 1 chemotherapy.   · 12/29/16 - Magnesium 1.1 (1.8-2.5), replaced intravenously.   · 1/5/17 - Cycle 1 day 15 chemotherapy held as patient leaving for vacation to [place]. Chemotherapy dose decreased by 20%. Patient complains of diarrhea for which Imodium prescribed.   · 1/11/17 - BRCA-1 and BRCA-2 negative.   · 1/12/17 - Echocardiogram with ejection fraction 60% or greater.   · 1/19/17 - Comprehensive metabolic panel with no significant abnormality. Patient started cycle 2 chemotherapy.   · 2/2/17 - Urine with >100,000 E-coli treated with Cipro 500 mg p.o. b.i.d. x1 week.   · 2/6/17 - Magnesium 1.1 (1.8-2.5), replaced intravenously.    · 2/23/17 - Patient started cycle 3 chemotherapy.   · 2/27/17 - CT chest with interval development of too numerous to count very small pulmonary nodules, ill-defined ground glass opacities concerning for development of pulmonary metastatic disease. Stable left-sided chest port. CT abdomen and pelvis with stable appearance of multiple small soft tissue densities within the abdomen near midline subcutaneous fat of the abdominal wall. Interval decrease in size of largely calcified left pelvic mass and multiple fat in bowel containing small ventral hernias.   · 3/6/17 - Pulmonary consultation obtained for lung nodules. Discussed CT results with patient. Decision made to continue present chemotherapy until further lung evaluation as abdominal disease better.  of 18  (0-35).    · 3/16/17 - Patient started cycle 4 chemotherapy, but treatment held from day 8 onwards because of hospitalization.   · 3/19/17 - Patient was hospitalized at [facility] between 3/19/17 and 3/25/17 with chest pain. Chest x-ray had revealed increased mild bibasilar airspace disease. Troponin I and EKG’s were negative. INR was elevated. Patient was treated with antibiotics. EGD was performed revealing small hiatal hernia and esophageal dilatation was performed. Bronchoscopy was performed also with no intrabronchial lesions identified. IgA was 51 (), IgG 320 (600-1500) and IgM 19 (). Lexiscan nuclear stress test had no evidence of reversible myocardial ischemia with normal left ventricular ejection fraction of 58%. CT chest had development of patchy bilateral ground glass opacities most pronounced involving the left upper lobe and bilateral lower lobes. Multiple tiny bilateral pulmonary nodules were less conspicuous. The patient underwent a bronchoscopy by Jerica Esparza M.D. with right upper lobe bronchoalveolar lavage revealing benign squamous cells and bronchial cells with pulmonary macrophages present. There was mild acute inflammation. Negative for malignant cells. Prilosec was changed to Famotidine for hypomagnesemia.    · 4/6/17 - Magnesium 1.2 (1.8-2.5). Comprehensive metabolic panel with no significant abnormality. Patient seen in follow-up by Fito Ram M.D. at [facility]. Treated with Lyrica and Oxycodone.    · 5/4/17 - Patient complains of a rash itching on her right upper arm. Eczematous rash noted and patient prescribed Cyclocort 0.1% b.i.d. Magnesium infusions continued with each chemo. Order written to resume chemotherapy.   · 5/16/17 - Cycle 5 chemotherapy started.   · 5/26/17 - Magnesium 1.4 (1.8-2.5), replaced intravenously. Potassium 2.9 (3.6-5.1), KCL increased to 20 mEq three in the morning and three in the evenings.   · 5/30/17 - PET scan with remaining metabolic  activity within the nodular areas. The suggested mass which is partially calcified and necrotic within the left aspect of the pelvis seems slightly larger with increased metabolic activity. There was more calcification in these areas compared to prior study, suggesting inflammation.      · 6/8/17 - Patient complaining of shortness of breath. Does take HCTZ at home. Chest x-ray ordered and chemotherapy continued along with weekly magnesium replacement. Chest x-ray with no acute cardiopulmonary abnormalities.   · 6/13/17 - Patient received cycle 6 of chemotherapy.   · 7/31/17 - WBC 5.0, hemoglobin 11.2, MCV 89.7, platelet count 217,000. Patient is to resume chemotherapy starting with cycle 7 on Wednesday of this week.  of 19 (<35). Potassium 3.3 (3.5-5.3).     · 8/2/17 - Patient began cycle 7 of chemotherapy.   · 8/30/17 - Patient started cycle 8 chemotherapy.   · 9/5/17 - Patient seen in followup at Pain Management by Fito Ram M.D. and continued on treatment with Oxycodone and Lyrica.   · 9/13/17 - Patient was hospitalized at [facility] for a day with dizziness secondary to dehydration, hypokalemia and anemia. She was given 1 unit of packed red blood cells and electrolytes were replaced. Chest x-ray revealed a subtle opacity in the left lateral lung base favored to represent atelectasis. Magnesium 1.3 (1.5-2.5), patient continued on replacement.    · 9/22/17 - Chemotherapy and magnesium replacement continued with decrease in chemotherapy dose by 10% secondary to cytopenias.   · 9/25/17 - Cycle 9 chemotherapy started.   · 10/12/17 - CT of the chest with contrast showed several tiny pulmonary nodules. One within the right lower lobe appears new from prior exams. Two adjacent foci of nodularity within the medial right lower lobe suggestive of minor infectious or inflammatory process. CT of the abdomen and pelvis with contrast which showed soft tissue nodules along the anterior abdominal and pelvic walls  unchanged from previous scan. Also a partially calcified mass within the left pelvis unchanged. No acute process identified within the abdomen or pelvis. Several left paracentral ventral hernias unchanged. No evidence of acute bowel obstruction.   · 10/16/17 - Order written for Levaquin 500 mg p.o. daily as the patient states that she has had a productive cough, fever and chills and with the results of the CT scan showing a possible infectious versus inflammatory process. Order also written to continue chemotherapy and magnesium replacement as previously prescribed.   · 10/23/17 - Cycle 10 chemotherapy started.   · 11/19/17 - Patient went to [facility] Emergency Room at [facility] complaining of right lower extremity redness and fever for a day. Venous Doppler had no evidence of a DVT and patient was discharged home on Clindamycin.   · 11/21/17 -  of 17 (0-35).   · 12/4/17 - Cycle 11 chemotherapy started.   · 12/20/17 - PET scan with multiple hypermetabolic soft tissue nodules abutting the anterior abdominal wall, stable from previous examination. Peripherally calcified left lower quadrant mass near the ascending colon stable in size demonstrating no hypermetabolic uptake. Previously had maximum SUV of 7. Right medial basilar pulmonary nodules resolved.   · 12/22/17 - CT left lower extremity with soft tissue swelling with skin thickening present along the anterior and medial aspect of the leg, slightly more pronounced around the palpable marker seen within the upper medial aspect of the lower extremity.   · 12/26/17 - Elastic stockings prescribed for lower extremity edema.   · 1/8/18 - Patient hospitalized at [facility] between 1/8/18 and 1/11/18 with fever of up to 101 degrees and painful rash on the lower extremities of several weeks’ duration. She was found to be hypokalemic and MRI of the lower extremities revealed thickening and swelling. Chest x-ray had no acute cardiopulmonary abnormality. Skin biopsy was performed by  Surgery revealing stasis dermatitis and no evidence of malignancy. Infectious Disease consultation was obtained and IV antibiotics were stopped. Blood cultures had no growth.   · 1/22/18 - Patient asked to start wearing elastic stockings which she has not started yet. She was given a prescription for Dyazide 1 p.o. daily.   · 2/26/18 - Ordered chemo to resume again. Patient unaware that she was supposed to resume chemo after her last visit in January. Continue magnesium infusions on day 1, 8 and 15. Prescribed Levaquin 500 mg p.o. daily x10 days for productive cough x1 week. History of viral illness consistent with influenza.  of 18 (0-35). Chest x-ray with no acute process.    · 3/5/18 - Cycle 12 chemotherapy started.   · 3/26/18 - Options discussed with patient. Decision made to discontinue IV Gemzar and orders written to start on Niraparib (Zejula) 300 mg p.o. daily as maintenance therapy.   · 4/11/18 - Niraparib discontinued due to insurance denial. Orders written to start Carboplatin-AUC 5 IV day 1 cycling every 21 days. Orders written for Neulasta 6 mg subcutaneous kit day 1 with palliative treatment intent and expected duration of treatment three months.   · 4/12/18 - CT chest, abdomen and pelvis with contrast revealed numerous tiny centrilobular nodules in the lungs favored to reflect infectious or inflammatory process. Nodules ranged 1-3 mm in size and are new from prior studies with metastatic disease not entirely excluded. Stable small hiatal hernia. Interval progression of metastatic disease involving the subcutaneous tissues at the ventral abdominal wall. Multiple soft tissue density nodules previously shown to be hypermetabolic on PET scan. New small crescent of low attenuation material along the periphery of the spleen with similar finding at the dome of the liver. Findings are concerning for peritoneal metastases. Density of the dome of the liver remained unchanged from multiple prior studies.  Stable calcified mass in the left pelvic sidewall. Urinary bladder wall thickening.   · 4/23/18 - Cycle 1 chemotherapy with Carboplatin administered along with Neulasta injection.   · 4/26/18 - Neulasta discontinued due to insurance denial.   · 5/14/18 - Patient received cycle 2 Carboplatin.   · 6/5/18 - Cycle 3 day 1 chemotherapy with Carboplatin administered.   · 6/20/18 - CT chest, abdomen and pelvis at [facility] showed re-demonstration of soft tissue mass in the ventral wall hernia left of the midline as well as the dome of the liver, likely representing metastatic disease similar as compared to the prior study. Additional calcified lesions present in the upper left hemipelvis and along the anterior abdominal wall to the right of the midline also likely representing metastatic disease. No definite new lesions were identified. Moderate to large stool burden likely related to mild constipation was present. No suspicious lung nodules were identified. The previously-described ground glass nodules appeared to have resolved. There was a stable subpleural nodule in the right upper lobe measuring 3 mm present since 2014 without significant changes.   · 6/26/18 - Cycle 4 day 1 Carboplatin administered with addition of Neulasta for febrile neutropenia prophylaxis.   · 6/29/18 - Reviewed CT scans with patient. Orders written to discontinue Carboplatin and Neulasta and plan to start Niraparib 300 mg by mouth daily as maintenance therapy. Prescribed Amlodipine 2.5 mg p.o. daily for chemo-induced hypertension.   · 7/18/18 - Orders written to increase weekly Magnesium Sulfate to 3 g IV weekly due to continued hypomagnesemia.   · 8/1/18 - Patient reports she has not received Niraparib (Zejula) to date.   · 8/30/18 - Patient’s phone was not working so though Niraparib approved, the drug company had not been able to get ahold of her. Patient supplied with drug company number so that she can start taking the pills.    · 9/6/18 -  of 20 (N).   · 9/18/18 - Urinalysis done for dysuria and back pain. Urine culture grew 20,000 to 50,000 colonies of urogenital ruy. Prescribed Bactrim DS one tablet twice daily for one week.   · September 2018 - Patient initiated on Zejula 300 mg p.o. daily.   · 10/1/18 - WBC 3.5, hemoglobin 12, platelet count 74,000. Niraparib (Zejula) dose held and then resumed when platelets above 100,000 at a dose of 200 mg daily.   · 10/15/18 - Patient received Niraparib (Zejula) at 200 mg p.o. daily with a platelet count of 198,000.   · 11/7/18 - WBC 6, hemoglobin 11.6, MCV 96.2, platelet count 137,000. Patient claims to have stopped taking Niraparib a few days prior because of cough. Asked to resume taking it. Ciprofloxacin 500 mg p.o. twice daily for one week for bronchitis.   · 12/3/18 - Magnesium Sulfate infusions changed to every two weeks, to send mag level before and give 3 grams. DC’d Magnesium Oxide and started Magnesium Plus Protein two pills twice daily.   · 1/4/19 - CT chest, abdomen and pelvis with new patchy nodular areas of airspace consolidation within the bilateral upper lobes, left greater than right, favored to be infectious or inflammatory. Interval enlargement of the peritoneal soft tissue masses within the pelvis compatible with progression of disease. Enlarging ventral hernia now containing multiple loops of small bowel and colon.   · 1/7/19 - Patient has not been coming in for weekly magnesium replacement as nursing has not been able to get ahold of her. Results of CT’s discussed with patient. Decision made to change her to IV chemotherapy with Carboplatin AUC-5 day 1 and pegylated liposomal Doxorubicin (Doxil) 30 mg/M2 IV day 1 to cycle every four weeks. Patient made aware of the limited choices of her treatment available.   · 1/31/19 - Echocardiogram showed LVEF 50-55% and otherwise normal echo Doppler study.   · 2/4/19 - New chemotherapy not initiated to date with difficulty  contacting patient for echocardiogram. Neulasta On-Pro 6 mg ordered with chemotherapy due to extensive prior exposure to chemotherapy, increasing risk of febrile neutropenia, history of neutropenic events on prior chemotherapy regimens.   · 2/15/19 - Patient started cycle 1 chemotherapy with Neulasta support.   · 3/6/19 -  of 28 (0-35).   · 3/15/19 - Cycle 2 Carboplatin with Liposomal Doxorubicin (Doxil) and Neulasta support initiated.     · 4/10/19 - Patient was requested to followup with Dr. Queen regarding hypotension and blood pressure medication dosing. Patient appears to be tolerating treatment well with cytopenias not requiring dose adjustments. No Doxil-related skin or mucus membrane toxicities or other significant toxicities to date.   · 4/12/19 - Cycle 3 chemotherapy given.   · 4/16/19 - CT chest, abdomen and pelvis with no evidence of active metastasis to the chest. Several tree-in-bud nodules within the periphery of the inferior right upper lobe likely infectious or inflammatory. Disease burden within abdomen and pelvis stable to slightly increased from prior CT. Specifically, a soft tissue mass along the right rectus muscle slightly increased in bulk. Multiple ventral hernias, some containing loops of bowel, with no evidence of acute bowel obstruction.   · 5/8/19 - Discussed CT results with patient. Overall stable disease and would like to continue same treatment. Dove soap and Hydrocortisone Cream p.r.n. prescribed for scattered rash on back.   · 5/10/19 - Cycle 4 Carboplatin and Liposomal Doxorubicin initiated.   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 It was ER positive, HER2/zuleima negative, PD-L1 negative. There was TOPO1 positivity and TUBB3 positivity. TMB was low (two mutations per megabyte MUTS/MB) and MSI was stable. There were 13 therapies considered with increased benefit. The 13 therapies with increased benefit  included Fulvestrant, Irinotecan, Letrozole, Topotecan, Anastrazole, Exemestane, Tamoxifen, all of which were referenced to [facility] as well as Abemaciclib, Everolimus, Gefitinib, Palbociclib, Ribociclib and Toremifene.     · 6/5/19 echocardiogram with preserved LV systolic function with EF around 60%.  · 6/7/19 cycle 5 chemotherapy with Neulasta support given.  Patient continued on mag oxide 400 mg p.o. twice daily and weekly IV 3 g mag sulfate infusions for persistent hypomagnesemia.  · 7/5/2019- cycle 6 chemotherapy with carboplatin and doxorubicin with Neulasta port initiated.  Ca125:  21 (0-35).  · 7/23/2019-CT chest abdomen and pelvis compared to CT from 4/16/2019 revealed multiple small pulmonary nodules unchanged from prior examination within the right upper and left lower lobes.  Complex ventral hernia similar to prior exam.  Soft tissue nodule along the right lateral margin of the right of the midline measuring 12 x 10 mm unchanged in size.  Irregular soft tissue nodular thickening along the margin of the most inferior aspect of the complex ventral hernia with thickness measuring up to 13 mm similar to prior examination.  Extensive calcified and soft tissue mass within the left lower quadrant decrease in size now measuring 2.6 x 2.3 cm previously 3.0 x 2.5 cm.  Partially calcified mass involving the right rectus sheath measuring 3.1 x 2.7 cm previously measured 3.3 x 2.9 cm.  Densely calcified mass measuring 2.1 x 1.6 cm unchanged previously measured 2 x 1.7 cm.  Right external iliac chain lymph node measuring 9 mm previously measured 5 mm.  · 8/2/2019 cycle 7 chemotherapy given.  · 8/30/2019-cycle 8 chemotherapy with carboplatin and doxorubicin with Neulasta support given.  · 9/27/2019 cycle 9 chemotherapy given.  · 10/1/19 - Echocardiogram showed Normal LV size and contractility EF of 60-65%. Normal RV size. Borderline left atrial enlargement. Aortic valve, mitral valve, tricuspid valve appears structurally  normal, no significant regurgitation seen.No pericardial effusion seen. Proximal aorta appears normal in size.  · 10/18/19 - Magnesium 1.5 (1.8-2.5).  IV magnesium replacement continued.  · 10/25/2019 cycle 10 chemotherapy given.  · 10/29/2019 patient had CT scan of the chest abdomen and pelvis-there is a new 2 mm nodule in the left lower lobe with a stable 2 mm nodule in the left lower lobe.  Follow-up in 6 months was recommended.  Stable multiple soft tissue density and calcified nodules within the ventral abdominal wall and left retroperitoneal fat consistent with nonviable metastatic disease.  These nodules have either decreased in size or stable.  · 11/22/2019 10 received cycle 11 of chemotherapy with Doxil and carboplatinum with Neulasta  · 12/8/2019 to 12/11/2019 patient was admitted to the hospital for neutropenic fever.  · 12/20/2019 patient received cycle 12 of chemotherapy with Doxil and carboplatinum with Neulasta  · 1/13/20: 2 D echo was normal with EF 56% to 60%  · 1/24/20-Patient received cycle 13 of combination chemotherapy with carboplatinum and Doxil  · 2/3/2020 patient had a  which was 25.4  · 2/21/2020-patient received cycle 14 of chemotherapy with carboplatinum and Doxil  · 3/6/2020 patient had CT scan of the chest, abdomen and pelvis this revealed a 3 mm nodule in the left lower lobe present on prior CT of the abdomen unchanged from July 2019.  Stable 3 mm right upper lobe nodule.  There is a lymph node in the AP window measuring 8 x 9 mm prominent left hilar lymph node measuring 12 mm previously was 7 x 7 mm no significant adenopathy was seen in the abdomen there is an area of irregular soft tissue in the left paramidline ventral hernia which is stable measuring 5.7 cm partially calcified mass in the right rectus measuring 3 x 2.5 cm which is also stable the prominent lymph nodes in the left mid hilum and mediastinum may be reactive or metastatic disease  · 4/17/2020-patient had cycle 15  of single agent carboplatinum  · 5/26/2020 patient had CT scan of the chest, abdomen and pelvis showed 3 new lung nodules measuring 7 mm in the left upper lobe, 5 mm in the left lower lobe and 4 mm in the right lower lobe.  There were additional small lung nodules which were unchanged.  Mildly prominent mediastinal and bilateral hilar lymph nodes mildly increased in size.  Right hilar node 9 mm previously was 5 mm.  Carinal node 9 mm previously was 6 mm.  The nodule at the right side of the hernia sac has increased to 1.5 cm from 1.2 cm.  Also a nodule in the subcutaneous fat currently measures 2.4 was previously 2 cm.  · 5/15/2020-patient received cycle 16 of carboplatinum  · Since the last visit in the office patient patient developed dizzy spell and fell. For that reason she was taken to the emergency room at [place].    · 7/3/2020 she had brain MRI which showed an 8 mm focus of abnormality in the left aspect of the posterior fossa corresponding to a dural based enhancing lesion thought to represent a calcified meningioma vessels calcified dural based metastasis.  This lesion has a slight local mass-effect and a small amount of surrounding edema.  There is also a 4 to 5 mm rounded focus of abnormal nodular enhancement along the cortex of the left parietal lobe but no significant mass-effect.  This is nonspecific could represent neoplastic process, infectious/inflammatory process or granulomatous disease.  · Patient was seen by neurosurgery, follow-up brain MRI in 8 weeks has been recommended by Dr. Hartman  · 7/9/2020 patient was initiated on single agent topotecan cycle 1 day 1  · 7/16/2020 she received the 8 topotecan  · 8/6/2020 patient received cycle 1 day 15 of topotecan  · 9/2/2020 patient had brain MRI multiple hospital which showed interval increase in size of the metastasis in the left parietal lobe now measuring 9 x 7 mm.  Previously was 5 mm.  There has been interval increase in size of the left superior  cerebellar metastases now measuring 1.3 cm previously was 8 mm.  There was no new metastatic disease identified.  · 9/11/2020 patient received cycle 2-day 15 of single agent topotecan  · Was admitted to the hospital 10/1/2020 for supratherapeutic INR  · 10/13/2020 patient was seen by Dr. Delong she has started stereotactic brain radiation to be completed on 10/28/2020  · 11/6/2020 patient received cycle 3-day 1 of chemotherapy with topotecan  · 11/13/2020 patient received cycle 3-day 8 of topotecan  · 11/30/2020 patient had CT scan of the chest, abdomen and pelvis this showed new reticular nodular interstitial thickening within the right lung mostly in the right middle lobe and right lower lobe worrisome for lymphangitic spread of cancer.  Evidence of progression of metastatic disease in the chest, abdomen and pelvis the new tiny sclerotic foci within the spine worrisome for osseous metastatic disease.  · 12/17/2020 - Discontinued topotecan due to progressive disease with orders written to begin Alimta 500 mg per metered squared IV q. 21 days  · 12/30/2020 - Started cycle 1 day 1 Alimta   · 1/18/2021 patient had bone scan which showed chest metastatic disease.  Xgeva has been ordered  · 3/12/2021 patient received cycle 1 of combination chemotherapy with cisplatinum and  Gemzar  · 4/23/21: Patient had cycle 2 day with cis-platinum and Gemzar  · 4/30/2021: Patient received cycle 2-day 8 of chemotherapy with cis-platinum and Gemzar  · 5/6/2021 patient had a CT scan of the chest which shows evidence of progressive disease  · 6/15/2021: Patient was admitted to the hospital for supratherapeutic INR, probable pneumonia.  She has since been discharged.  · 7/9/2021 patient received cycle 1 day 1 of single agent vinorelbine  · 7/16/2021 patient received cycle 1 day 8 of single agent Navelbine     2. Deep vein thrombosis of left upper extremity diagnosis established in October of 2013.  · 10/16/13 - Venous Doppler of left upper  extremity.  Impression:  Deep vein thrombosis involving the subclavian, axillary and brachial veins on the left side, superficial vein thrombosis involving the left basilic vein.  · 10/16/13 - Order written for Lovenox 120 mg subcutaneously daily until INR is in therapeutic range.  Order written for Coumadin 5 mg p.o. daily.  The patient is to follow PT and INR protocol.  · 5/20/16 - Venous Doppler bilateral lower extremities normal.  · 7/30/19 - Patient continued on Coumadin following the INR protocol.      3. Anemia diagnosis established in November 2013 and pernicious anemia diagnosis established March 2016.   · 11/15/13 - Hemoglobin is 7.8.  · 12/2/13 - Orders written to start Aranesp 200 mg subcutaneous every two weeks due to low hemoglobin secondary to chemo induced anemia.  Hemoglobin is 9.7.  Anemia workup is ordered.  · 12/03/13 - Ferritin 179.8 (N), folic acid 8.3 (N), vitamin B12 314, iron 104 (N), TIBC 298 (N), iron saturation 35 (N), Reticulocyte count 0.88 (N).  EPO level 124 (N).  Haptoglobin 22 (H).  · 10/22/14 - Hemoglobin 12.5, hematocrit 37.3, MCV 91.6.  · 10/23/14 - Anemia resolved.   · 3/8/16 - WBC 5.8, hemoglobin 11.7, MCV 90.3, platelet count 245,000. Vitamin B12 of 189 (180-914), ferritin 61 (), iron 91 (), TIBC 345 (228-428).   · 3/15/16 -  ().        · 3/22/16 - Intrinsic factor antibody positive, antiparietal antibody negative. Vitamin B12 at 1000 mcg IM weekly started, but the patient after receiving first dose on 3/22/16 did not come back for injections until 4/13/16.   · 4/13/16 - WBC 5.1, hemoglobin 12.5, MCV 87.9, platelet count 201,000. Patient given B12 injection and then continued at home.   · 5/10/16 - WBC 5.1, hemoglobin 12.5, platelet count 201,000.   · 6/14/16 - Monthly Vitamin B12 injections continued at home.   · 7/11/16 - WBC 5.48, hemoglobin 12.7, MCV 86.2, platelet count 200,000.   · 8/16/16 - Patient continued on monthly Vitamin B12 injections at  home.   · 1/5/17 - WBC 2.6 with 38% neutrophils, 56% lymphocytes, 5% monocytes, hemoglobin 10.5, .3, platelet count 63,000. Patient continued on Vitamin B12 injections 1000 mcg IM monthly at home.   · 3/20/17 - Retic 0.41 (0.5-1.5), creatinine 1.0 (0.4-1.0). Iron 12 (), TIBC 270 (228-428), ferritin 259 (), haptoglobin 340 (), TSH 0.43 (0.34-5.6), folate 6.0 (5.9-24.8). Serum protein electrophoresis revealed decreased gammaglobulins. Serum EDU had no monoclonal gammopathy identified. Stool Hemoccult was negative.   · 6/8/17 - WBC 6.3, hemoglobin 8.3, MCV 92.4, platelet count 50,000. Procrit 40,000 units subq weekly added for chemotherapy-induced anemia. Patient continued on Vitamin B12 at 1000 mcg IM monthly at home.   · 7/5/17 - Iron 33 (), TIBC 328 (228-428), ferritin 211 (), TSH 2.46 (0.34-5.6).       · 7/31/17 - WBC 5.0, hemoglobin 11.2, MCV 89.7, platelet count 217,000. We will continue with the Procrit 40,000 units subq weekly for chemo-induced anemia when the hemoglobin falls below 10.0 and the patient is also to continue with the Vitamin B12 at 1000 mcg IM monthly at home. Creatinine 1.24 (0.5-0.99).    · 8/28/17 - WBC 9.6, hemoglobin 8.7, MCV 93.7, platelet count 133,000. Patient is to continue with the Procrit 40,000 units subq weekly and will receive that today. She is also to continue with the Vitamin B12 at 1000 mcg IM monthly at home.  · 10/16/17 - WBC 7.5, hemoglobin 9.6, MCV 98.4, platelet count 195,000. Patient is to continue with Procrit 40,000 units subq weekly and will receive Procrit today.   · 1/1/18 - Creatinine 1.3 (0.4-1.0).    · 2/19/18 - Procrit given for hemoglobin 9.9.   · 2/26/18 - Continue Procrit 40,000 units weekly p.r.n. hemoglobin <10. Continue Vitamin B12 at 1000 mcg IM monthly at home.   · 3/26/18 - Hemoglobin 8.3. Procrit dose increased to 60,000 units weekly. Iron 29 (), TIBC 306 (228-428), and iron sat 9% (15-50). Iron 29 (),  TIBC 306 (228-428), iron saturation 9% (15-50).   · 3/27/18 - Order written to start Ferrous sulfate 325 mg p.o. b.i.d.    · 4/2/18 - Stool heme negative x3.   · 4/30/18 - Patient had not started Ferrous sulfate 325 mg p.o. b.i.d. and verbalized understanding to do so and to notify the office if side effects are not tolerable.   · 5/24/18 - Patient was hospitalized at [facility] between 5/24/18 and 5/26/18 with dark tarry stool. INR was subtherapeutic. She was given IV Protonix and Protonix 40 mg daily. She underwent an EGD by David Rogers M.D. revealing small hiatal hernia, small submucosal nodule near the cardia, erosive gastritis. Pathology on gastric antrum and body biopsy revealed iron pill gastritis and no evidence of Helicobacter pylori. She then underwent a colonoscopy revealing diverticulosis, hemorrhoids and surgical changes from previous resection. It was recommended to consider outpatient M2A if hemoglobin continued to drop.   · 6/4/18 - Order written to discontinue iron pills and to use Injectafer 750 mg IV days 1 and 8 for low iron sats. Order written for Procrit 40,000 units subq weekly. Iron 65 (), TIBC 328 (228-428), iron saturation 20% (15-50), ferritin 123 ().   · 6/29/18 - Discussed patient’s intolerance of oral iron due to gastritis. Oral iron discontinued with plans for Injectafer should iron level drop in the future.   · 11/7/18 - Patient claims not to be taking Vitamin B12 injections at home. Asked to resume those.   · 12/3/18 - Patient reports she has not been taking her B12 injections for a couple of months. She needs some syringes and needles for that.   · 2/4/19 - Patient was uncertain if she is currently taking ferrous Sulfate or not. Patient with history of intolerance and will follow hemoglobin.   · 5/8/19 - Ferritin 64 ().  · 8/27/2019- iron 52 (), iron saturation 14% (15-50), TIBC 364 (228-420), and ferritin 74 ().  Injectafer 750 mg IV day 1 and 8  due to oral iron intolerance ordered.  · 8/30/2019- Injectafer 750 mg IV day 1 given.  Hemoglobin 10.8.  At the end of the infusion heart rate was 48 and the patient reported mild dizziness.  Patient was sent to the ED for evaluation with symptoms resolving rapidly.  Chest x-ray and CT head were negative for acute findings.  · 9/6/2019-Injectafer 750 mg IV day 8 given without adverse effects.  Hemoglobin 9.8.   · 9/25/19 - Iron 85 (). Iron saturation 25 (15-50). TIBC 335 (228-428). Ferritin  694 ().  Hemoglobin 10.3.  · 10/25/2019 hemoglobin 9.7.  Patient started on Retacrit 40,000 units subcu weekly.  · 12/17/2020 hemoglobin 11.9, hematocrit 38.3     Past Medical History:   Diagnosis Date   • Anemia 2013   • Cervical disc disorder 2013    Herniated disc, pinched nerve   • Clotting disorder (CMS/HCC) 2013    Low platelets from chemo   • Colon polyp 2013   • Deep vein thrombosis (CMS/HCC) 2013   • Diverticulosis 2013   • GERD (gastroesophageal reflux disease) 2016   • HL (hearing loss) 2016    I need hearing aids   • Hyperlipidemia 2013    Need my cholesterol rechecked   • Hypertension    • Joint pain 2013    Shoulder pain, torn rotator cuff   • Low back pain 2013   • Neuromuscular disorder (CMS/HCC) 2015    Shingles, pinched nerves in my neck   • Osteopenia 2014   • Osteoporosis    • Ovarian cancer (CMS/HCC)     ovarian--  spread to lungs 10/2020   • Ovarian cancer on left (CMS/HCC) 1995   • Pneumonia 2010    Have had it several times   • Spinal stenosis 2013    Cervical   • Urinary tract infection 2013    Have had several utis       Past Surgical History:   Procedure Laterality Date   • BRONCHOSCOPY N/A 2/10/2021    Procedure: BRONCHOSCOPY with bronchioalveolar lavage;  Surgeon: Jerica Esparza MD;  Location: [facility] ENDOSCOPY;  Service: Pulmonary;  Laterality: N/A;  post op:right lobe pneumonia   • BRONCHOSCOPY N/A 6/15/2021    Procedure: BRONCHOSCOPY with bronchoalveolar lavage;  Surgeon: Jerica Esparza MD;   Location: [facility] ENDOSCOPY;  Service: Pulmonary;  Laterality: N/A;  lung cancer;pneumonia   • CATARACT EXTRACTION     • COLON SURGERY     • COLONOSCOPY  05/26/2018   • EXPLORATORY LAPAROTOMY     • HERNIA REPAIR     • HYSTERECTOMY     • OOPHORECTOMY           Current Outpatient Medications:   •  acetaminophen (TYLENOL) 500 MG tablet, Take 1,000 mg by mouth Every 6 (Six) Hours As Needed for Mild Pain ., Disp: , Rfl:   •  albuterol sulfate  (90 Base) MCG/ACT inhaler, Inhale 2 puffs Every 4 (Four) Hours As Needed for Wheezing., Disp: 18 g, Rfl: 0  •  atorvastatin (LIPITOR) 20 MG tablet, Take 20 mg by mouth every night at bedtime., Disp: , Rfl: 3  •  calcium carbonate (Calcium 600) 600 MG tablet, Take 1 tablet by mouth 2 (two) times a day., Disp: 60 tablet, Rfl: 11  •  calcium carbonate-vitamin d 600-400 MG-UNIT per tablet, Take 1 tablet by mouth 2 (Two) Times a Day., Disp: 60 tablet, Rfl: 3  •  cetirizine (zyrTEC) 10 MG tablet, Take 1 tablet by mouth Every Night., Disp: 30 tablet, Rfl: 0  •  cyanocobalamin 1000 MCG/ML injection, Inject 1,000 mcg into the appropriate muscle as directed by prescriber Every 28 (Twenty-Eight) Days., Disp: , Rfl:   •  dexamethasone (DECADRON) 4 MG tablet, Take 2 tablets in the morning daily on days 2, 3 & 4.  Take with food., Disp: 6 tablet, Rfl: 5  •  famotidine (PEPCID) 40 MG tablet, Take 1 tablet by mouth Every Morning Before Breakfast., Disp: 90 tablet, Rfl: 1  •  folic acid (FOLVITE) 1 MG tablet, Take 1,000 mcg by mouth Daily., Disp: , Rfl:   •  HYDROcodone-homatropine (HYCODAN) 5-1.5 MG tablet tablet, Take 1 tablet by mouth 2 (two) times a day., Disp: 60 tablet, Rfl: 0  •  MAGnesium-Oxide 400 (241.3 Mg) MG tablet tablet, Take 400 mg by mouth 2 (Two) Times a Day., Disp: , Rfl:   •  montelukast (SINGULAIR) 10 MG tablet, TAKE 1 TABLET BY MOUTH EVERY NIGHT, Disp: 30 tablet, Rfl: 2  •  ondansetron (ZOFRAN) 8 MG tablet, Take 1 tablet by mouth 3 (Three) Times a Day As Needed for Nausea  or Vomiting., Disp: 30 tablet, Rfl: 5  •  oxyCODONE (ROXICODONE) 15 MG immediate release tablet, Take 1 tablet by mouth Every 4 (Four) Hours As Needed for Moderate Pain ., Disp: 180 tablet, Rfl: 0  •  phosphorus (K PHOS NEUTRAL) 155-852-130 MG tablet, Take 1 tablet by mouth 2 (Two) Times a Day. Take with full glass of water, Disp: 30 tablet, Rfl: 2  •  potassium chloride (K-DUR,KLOR-CON) 20 MEQ CR tablet, Take 2 tablets by mouth 3 (Three) Times a Day., Disp: 120 tablet, Rfl: 3  •  pregabalin (LYRICA) 100 MG capsule, Take 1 capsule by mouth 3 (Three) Times a Day., Disp: 90 capsule, Rfl: 5  •  sertraline (ZOLOFT) 50 MG tablet, Take 50 mg by mouth Every Night., Disp: , Rfl:   •  temazepam (RESTORIL) 30 MG capsule, Take 1 capsule by mouth At Night As Needed for Sleep., Disp: 30 capsule, Rfl: 1  •  triamterene-hydrochlorothiazide (DYAZIDE) 37.5-25 MG per capsule, TAKE 1 CAPSULE BY MOUTH EVERY DAY, Disp: 90 capsule, Rfl: 1  •  warfarin (COUMADIN) 1 MG tablet, Take 1 tablet by mouth Daily. At 1700 as directed, Disp: 30 tablet, Rfl: 3    Current Facility-Administered Medications:   •  theophylline (PEG-24) 24 hr capsule 300 mg, 300 mg, Oral, Q24H, Jerica Esparza MD    Facility-Administered Medications Ordered in Other Visits:   •  epoetin meghna-epbx (RETACRIT) injection 40,000 Units, 40,000 Units, Subcutaneous, Once, Cindy Ball MD  •  heparin injection 500 Units, 500 Units, Intravenous, PRN, Cindy Ball MD  •  heparin injection 500 Units, 500 Units, Intravenous, PRN, Cindy Ball MD  •  sodium chloride 0.9 % flush 10 mL, 10 mL, Intravenous, PRN, Cindy Ball MD, 10 mL at 07/26/21 1009  •  sodium chloride 0.9 % flush 10 mL, 10 mL, Intravenous, PRN, Cindy Ball MD    Allergies   Allergen Reactions   • Morphine Nausea Only and Hives   • Penicillin V Potassium Rash   • Sulfa Antibiotics Rash   • Levaquin [Levofloxacin] Nausea Only and Dizziness     When taken with Coumadin  "  • Amoxicillin Rash       Family History   Problem Relation Age of Onset   • Hypertension Mother    • Cancer Father    • Hypertension Father    • Hypertension Sister    • Hypertension Brother    • Stroke Brother        Cancer-related family history includes Cancer in her father.    Social History     Tobacco Use   • Smoking status: Never Smoker   • Smokeless tobacco: Never Used   Vaping Use   • Vaping Use: Never used   Substance Use Topics   • Alcohol use: No     Comment: caffeine 32-64oz qd   • Drug use: No     HPI, ROS and PFSH have been reviewed and confirmed on 7/26/2021.     SUBJECTIVE:    Patient is here today for follow-up.  Her cough has significantly improved on Hycodan.  She has chronic shortness of breath.      REVIEW OF SYSTEMS:    Review of Systems   Constitutional: Negative for chills and fever.   HENT: Negative for ear pain, mouth sores, nosebleeds and sore throat.    Eyes: Negative for photophobia and visual disturbance.   Respiratory: Negative for wheezing and stridor.    Cardiovascular: Negative for chest pain and palpitations.   Gastrointestinal: Negative for abdominal pain, diarrhea, nausea and vomiting.   Endocrine: Negative for cold intolerance and heat intolerance.   Genitourinary: Negative for dysuria and hematuria.   Musculoskeletal: Negative for joint swelling and neck stiffness.   Skin: Negative for color change and rash.   Neurological: Negative for seizures and syncope.   Hematological: Negative for adenopathy.        No obvious bleeding   Psychiatric/Behavioral: Negative for agitation, confusion and hallucinations.     OBJECTIVE:  Vitals:    07/26/21 1057   BP: 111/72   Pulse: 69   Resp: 18   Temp: 97.1 °F (36.2 °C)   TempSrc: Infrared   Weight: 75.3 kg (166 lb)   Height: 165.1 cm (65\")     Temp 97.5   Pulse 64  RR 18  /92  Height 165.1 cm   Weight 81.6 kg   BSA 1.89  BMI 30    ECOG  (1) Restricted in physically strenuous activity, ambulatory and able to do work of light " nature    Physical Exam   Constitutional: She is oriented to person, place, and time. No distress.   Obese    HENT:   Head: Normocephalic and atraumatic.   Mouth/Throat: Mucous membranes are moist.   Eyes: Conjunctivae are normal. Right eye exhibits no discharge. Left eye exhibits no discharge. No scleral icterus.   Neck: No thyromegaly present.   Cardiovascular: Normal rate, regular rhythm and normal heart sounds. Exam reveals no gallop and no friction rub.   Pulmonary/Chest: Effort normal. No stridor. No respiratory distress. She has no wheezes.   Left chest wall port    Abdominal: Soft. Bowel sounds are normal. She exhibits no mass. There is no abdominal tenderness. There is no rebound and no guarding.   Musculoskeletal: Normal range of motion. No tenderness.   Lymphadenopathy:     She has no cervical adenopathy.   Neurological: She is alert and oriented to person, place, and time. She exhibits normal muscle tone.   Skin: Skin is warm and dry. She is not diaphoretic. No erythema.   Psychiatric: Her behavior is normal. Mood normal.   Nursing note and vitals reviewed.    I have reexamined the patient and the results are consistent with the previously documented exam. Cindyalexandrea Ball MD     RECENT LABS    WBC   Date Value Ref Range Status   07/26/2021 3.72 3.40 - 10.80 10*3/mm3 Final   07/05/2020 4.04 (L) 4.5 - 11.0 10*3/uL Final     RBC   Date Value Ref Range Status   07/26/2021 3.40 (L) 3.77 - 5.28 10*6/mm3 Final   07/05/2020 3.68 (L) 4.0 - 5.2 10*6/uL Final     Hemoglobin   Date Value Ref Range Status   07/26/2021 9.6 (L) 12.0 - 15.9 g/dL Final   07/05/2020 11.5 (L) 12.0 - 16.0 g/dL Final     Hematocrit   Date Value Ref Range Status   07/26/2021 31.4 (L) 34.0 - 46.6 % Final   07/05/2020 35.3 (L) 36.0 - 46.0 % Final     MCV   Date Value Ref Range Status   07/26/2021 92.4 79.0 - 97.0 fL Final   07/05/2020 95.9 80.0 - 100.0 fL Final     MCH   Date Value Ref Range Status   07/26/2021 28.2 26.6 - 33.0 pg Final    07/05/2020 31.3 26.0 - 34.0 pg Final     MCHC   Date Value Ref Range Status   07/26/2021 30.6 (L) 31.5 - 35.7 g/dL Final   07/05/2020 32.6 31.0 - 37.0 g/dL Final     RDW   Date Value Ref Range Status   07/26/2021 19.4 (H) 12.3 - 15.4 % Final   07/05/2020 15.9 12.0 - 16.8 % Final     RDW-SD   Date Value Ref Range Status   07/26/2021 62.7 (H) 37.0 - 54.0 fl Final     MPV   Date Value Ref Range Status   07/26/2021 10.4 6.0 - 12.0 fL Final   07/05/2020 10.2 6.7 - 10.8 fL Final     Platelets   Date Value Ref Range Status   07/26/2021 256 140 - 450 10*3/mm3 Final   07/05/2020 158 140 - 440 10*3/uL Final     Neutrophil Rel %   Date Value Ref Range Status   07/05/2020 54.0 45 - 80 % Final     Neutrophil %   Date Value Ref Range Status   07/26/2021 54.6 42.7 - 76.0 % Final     Lymphocyte Rel %   Date Value Ref Range Status   07/05/2020 30.4 15 - 50 % Final     Lymphocyte %   Date Value Ref Range Status   07/26/2021 22.6 19.6 - 45.3 % Final     Monocyte Rel %   Date Value Ref Range Status   07/05/2020 12.4 0 - 15 % Final     Monocyte %   Date Value Ref Range Status   07/26/2021 20.4 (H) 5.0 - 12.0 % Final     Eosinophil %   Date Value Ref Range Status   07/26/2021 1.9 0.3 - 6.2 % Final   07/05/2020 3.0 0 - 7 % Final     Basophil Rel %   Date Value Ref Range Status   07/05/2020 0.2 0 - 2 % Final     Basophil %   Date Value Ref Range Status   07/26/2021 0.5 0.0 - 1.5 % Final     Immature Grans %   Date Value Ref Range Status   07/05/2020 0.0 0 % Final     Neutrophils Absolute   Date Value Ref Range Status   07/05/2020 2.18 2.0 - 8.8 10*3/uL Final     Neutrophils, Absolute   Date Value Ref Range Status   07/26/2021 2.03 1.70 - 7.00 10*3/mm3 Final     Lymphocytes Absolute   Date Value Ref Range Status   07/05/2020 1.23 0.7 - 5.5 10*3/uL Final     Lymphocytes, Absolute   Date Value Ref Range Status   07/26/2021 0.84 0.70 - 3.10 10*3/mm3 Final     Monocytes Absolute   Date Value Ref Range Status   07/05/2020 0.50 0.0 - 1.7  10*3/uL Final     Monocytes, Absolute   Date Value Ref Range Status   07/26/2021 0.76 0.10 - 0.90 10*3/mm3 Final     Eosinophils Absolute   Date Value Ref Range Status   07/05/2020 0.12 0.0 - 0.8 10*3/uL Final     Eosinophils, Absolute   Date Value Ref Range Status   07/26/2021 0.07 0.00 - 0.40 10*3/mm3 Final     Basophils Absolute   Date Value Ref Range Status   07/05/2020 0.01 0.0 - 0.2 10*3/uL Final     Basophils, Absolute   Date Value Ref Range Status   07/26/2021 0.02 0.00 - 0.20 10*3/mm3 Final     Immature Grans, Absolute   Date Value Ref Range Status   07/05/2020 0.00 <1 10*3/uL Final     nRBC   Date Value Ref Range Status   06/15/2021 0.0 0.0 - 0.2 /100 WBC Final       Lab Results   Component Value Date    GLUCOSE 82 07/26/2021    BUN 11 07/26/2021    CREATININE 0.92 07/26/2021    EGFRIFNONA 61 07/26/2021    EGFRIFAFRI >60 06/07/2019    BCR 12.0 07/26/2021    K 3.9 07/26/2021    CO2 25.0 07/26/2021    CALCIUM 8.4 (L) 07/26/2021    ALBUMIN 3.60 07/26/2021    LABIL2 1.3 07/03/2020    AST 20 07/26/2021    ALT 8 07/26/2021     ASSESSMENT:    1. 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  She was on carboplatinum, Doxil.  Patient had had carboplatinum Taxol, carbo Taxotere, Gemzar single agent, niraparib and was on Doxil and carboplatin.  Doxil was discontinued due to approaching of lifetime dosing.  Progressed on single agent topotecan ,Alimta and combination chemotherapy with Gemzar plus cis-platinum.  Patient also progressed on oral etoposide.  Refer to paradigm testing for future options at time of progression.  Patient has had progression of disease and therefore platinum was discontinued. On exam today I felt a right lower abdominal mass which is new.  She has CT scan of the chest, abdomen and pelvis on 3/1/2021 this showed moderate size pericardial effusion, scattered pulmonary nodules with septal thickening worse in the right lung compared to the  left.  Pathologically enlarged hilar and mediastinal lymph nodes were suspicious for metastatic disease.  CT of the abdomen and pelvis showed increase in size of multiple soft tissue masses involving the anterior abdominal wall likely related to worsening of metastatic disease.  Increase in size of multiple sclerotic lesions within the lumbar spine and pelvis.  A 2D echocardiogram which did not reveal any significant pericardial effusion on 3/4/2021.  Recent CT scan of the chest suggested patient may have lymphangitic spread of malignancy.  Patient developed more pulmonary symptoms while on single agent oral etoposide.  Therefore this chemotherapy has been discontinued.  She is currently on single agent vinorelbine  2. Reviewed the literature and other potential treatment options for her will include single agent vinorelbine, Xeloda, Cytoxan, oxaliplatin and aromatase inhibitors including letrozole and Faslodex.  If she has a high tumor mutational burden or PD-L1 expression, immunotherapy could also be an option.  I have therefore recommended single agent vinorelbine.  Also will proceed with biopsying of the anterior abdominal mass to send tissue for foundation 1 testing.  Patient still has a distant performance status and is interested in pursuing treatment.    3. Patient has a subcutaneous nodule noted on the anterior abdominal wall consistent with metastatic disease currently measures 3.5 cm.  The anterior abdominal wall nodule has not significantly changed.  We will continue to monitor as the index cutaneous lesion.  4. Patient has bone metastases therefore I recommended Xgeva 120 mg subcu monthly.  Reviewed the side effects, benefits in detail with patient.  She would also be initiated on Os-Johnny D 600 mg twice a day.  She will continue Xgeva  5. Progressive brain metastasis: Status post stereotactic brain radiation under the care of Dr. Delong and recent MRI of the brain on 12/15/2020 shows probable progression.   Recent brain MRI did not show any obvious signs of progression.  6. B12 deficiency on parenteral B12 injections  7. History of DVT on anticoagulation therapy with warfarin:   8. Pancytopenia: Due to chemotherapy: On Procrit  9. Hypomagnesemia: Continue to monitor levels and infuse as needed  10. Iron deficiency anemia: Monitoring  11. Post hospital visit  12. Assessment has been reviewed and updated as documented above    Discussion:    Reviewed her CT scans with patient.  She has progressive disease.  Oral etoposide has been discontinued.  Vinorelbine has been recommended.  Reviewed the side effects in detail with patient to include but not limited to:  Chemotherapy side effects include, but not limited to, nausea, vomiting, bone marrow suppression, which can result in blood, platelet transfusion. There is also risk of permanent bone marrow destruction, which can cause myelodysplastic syndrome or leukemia years down the line. There is risk of infection which can result in hospitalization and even death. There is also risk of fatigue, asthenia, alopecia which could become permanent. Chemo will help to reduce risk of relapse of cancer, but does not eliminate risk completely.    Vinorelbine can also lead to myalgias        PLANS:    1. Foundation 1 testing and PD-L1 sent off  2. Continue single agent chemotherapy with vinorelbine next week.    3. CA-125 today, CBC and CMP  4. Increase phosphorus supplements to 3 times a day  5. Follow-up with me in 3 weeks  6. Continue supportive care  7. Continue Xgeva 120 mg subcu monthly  8. Continue calcium carbonate with Vitamin D 600 mg twice a day  9. Continue folic acid 1 mg p.o. daily -reviewed needs to stay on drug to prevent side effects from Alimta   10. Continue B12 injections 1000 mcg IM monthly, next due 1/14/2021  11. Status post IV Injectafer  12. Continue magnesium oxide 400 mg three times a day and weekly IV replacement as needed.   13. Continue Retacrit 40,000  units subcu weekly for hemoglobin less than 10, for chemotherapy-induced anemia  14. Continue supportive medications  15. MRI brain reviewed  16. All questions answered     I have reviewed labs results, imaging, vitals, and medications with the patient today.   Lab Results   Component Value Date    WBC 3.72 07/26/2021    HGB 9.6 (L) 07/26/2021    HCT 31.4 (L) 07/26/2021    MCV 92.4 07/26/2021     07/26/2021     Patient verbalized understanding and is in agreement of the above plans.        I spent 30 total minutes, face-to-face, caring for Tahira today.  90% of this time involved counseling and/or coordination of care as documented within this note.

## 2021-07-27 NOTE — TELEPHONE ENCOUNTER
Caller: Tahira Zimmerman    Relationship: Self    Best call back number: [phone]    Medication needed:   Requested Prescriptions     Pending Prescriptions Disp Refills   • HYDROcodone-homatropine (HYCODAN) 5-1.5 MG tablet tablet 60 tablet 0     Sig: Take 1 tablet by mouth 2 (two) times a day.       When do you need the refill by: ASAP    What additional details did the patient provide when requesting the medication: PATIENT STATES THAT THE TABLETS ARE NO LONGER BEING MADE SO SHE IS GOING BACK TO THE LIQUID FORM.  THE PHARMACY GAVE HER THE LAST 45 TABLETS THAT THEY HAD INSTEAD OF 60 THAT IS WHY SHE IS OUT EARLY.    Does the patient have less than a 3 day supply:  [x] Yes  [] No    What is the patient's preferred pharmacy: [id number] [address] AT SEC OF [place] [address] & [facility] [address]  - [phone] [facility] [phone] FX

## 2021-08-02 NOTE — TELEPHONE ENCOUNTER
Caller: WAYLON    Relationship: [facility]    Best call back number: [phone]    What is the best time to reach you: UNTIL 1:30    Who are you requesting to speak with (clinical staff, provider,  specific staff member): NURSE    What was the call regarding: PATIENT HAS CRITICAL LABS: PHOSPHORUS: 1.4    Do you require a callback: ONLY WITH ADDITIONAL QUESTIONS OR CONCERNS.     THANKS

## 2021-08-05 NOTE — TELEPHONE ENCOUNTER
Attempted to call pt to let her know that she needs to increase her Mag to TID. No answer or identifying VM. Callback number left.

## 2021-08-05 NOTE — TELEPHONE ENCOUNTER
Received a call from pt's daughter asking to verify some of her medications since the dosages have changed recently. I reviewed the medications with her and she verbalized understanding. She stated that she needs a refill of K-Phos and Coumadin 5 mg. I told her I would put in a refill request. She verbalized understanding.

## 2021-08-05 NOTE — TELEPHONE ENCOUNTER
----- Message from Cindy Ball MD sent at 8/3/2021  5:53 PM EDT -----  Ok, Increase mag to three x a day  ----- Message -----  From: Anna Marie Muniz RN  Sent: 8/3/2021   4:57 PM EDT  To: Cindy Ball MD    She was only taking K-Phos once daily so I had her increase to TID. She is taking 400 mg of Mag BID.   ----- Message -----  From: Cindy Ball MD  Sent: 8/3/2021  11:52 AM EDT  To: Anna Marie Muniz RN    Also find out if she is on max supplements.  If she is then we will need to adjust as well.  Let me know.

## 2021-08-17 NOTE — TELEPHONE ENCOUNTER
Called patient regarding her message into us. Her daughter answered and said she was quarantined from her. That Tahira has a cough,fever and chills. I educated her that she needs to go get tested and that if she is having any shortness of breath she should go to the ER. Carlee verbalized understanding. Denies further needs today.

## 2021-08-19 NOTE — TELEPHONE ENCOUNTER
Patient missed chemo appt on 8-.  Attempted to contact patient.  No answer.  LMV asking her to call back.

## 2021-08-20 NOTE — TELEPHONE ENCOUNTER
Received VM from patient stating she was diagnosed with COVID on 8-.  She has has to be quarantined for 12 more days.  She states she will call back to reschedule appts.

## 2021-08-25 PROBLEM — U07.1 PNEUMONIA DUE TO COVID-19 VIRUS: Status: ACTIVE | Noted: 2021-01-01

## 2021-08-25 PROBLEM — I95.9 HYPOTENSION: Status: ACTIVE | Noted: 2021-01-01

## 2021-08-25 PROBLEM — G93.40 ENCEPHALOPATHY ACUTE: Status: ACTIVE | Noted: 2021-01-01

## 2021-08-25 PROBLEM — J12.82 PNEUMONIA DUE TO COVID-19 VIRUS: Status: ACTIVE | Noted: 2021-01-01

## 2021-08-25 PROBLEM — C79.31 BRAIN METASTASES: Chronic | Status: ACTIVE | Noted: 2020-09-18

## 2021-08-25 PROBLEM — C79.51 BONE METASTASES: Chronic | Status: ACTIVE | Noted: 2021-01-25

## 2021-08-30 PROBLEM — D89.832 CYTOKINE RELEASE SYNDROME, GRADE 2: Status: ACTIVE | Noted: 2021-01-01

## 2021-09-02 NOTE — OUTREACH NOTE
Ambulatory Case Management Note    SNF Follow-up    Questions/Answers      Responses   Acute Facility Discharged From  [facility]   Acute Discharge Date  08/30/21   Name of the Skilled Nursing Facility?  [facility]   Purpose of SNF Admission  PT, OT, SN   Estimated length of stay for the patient?  TBD   Who is the insurance provider or payor of patient stay?  Medicare   Progression of Patient?  daily therapies continue        RN-ACM outreach call made to [facility]. Staff reports pt is still there. SW dept unavailable for additional information. RN-ACM will continue to monitor for discharge.    Vanesa Carlson RN  Ambulatory Case Management    9/2/2021, 12:03 EDT

## 2021-09-09 NOTE — OUTREACH NOTE
Ambulatory Case Management Note    SNF Follow-up    Questions/Answers      Responses   Acute Facility Discharged From  [facility]   Acute Discharge Date  08/30/21   Name of the Skilled Nursing Facility?  [facility]   Purpose of SNF Admission  PT, OT, SN   Estimated length of stay for the patient?  TBD   Who is the insurance provider or payor of patient stay?  Medicare   Progression of Patient?  daily therapies continue        RN-ACM outreach call made to [facility]. Staff reports pt is still there. SW dept unavailable for additional information. RN-ACM will continue to monitor for discharge.    Vanesa Carlson RN  Ambulatory Case Management    9/9/2021, 13:06 EDT

## 2021-09-10 NOTE — OUTREACH NOTE
Prep Survey      Responses   [facility] facility patient discharged from?  Non-BH   Is LACE score < 7 ?  Non-BH Discharge   Emergency Room discharge w/ pulse ox?  No   Eligibility  [facility]   Date of Admission  08/30/21   Date of Discharge  09/09/21   Discharge diagnosis  covid pneumonia   Does the patient have one of the following disease processes/diagnoses(primary or secondary)?  Non-BH Discharge   Prep survey completed?  Yes          Gina Mary RN

## 2021-09-10 NOTE — OUTREACH NOTE
Call Center TCM Note      Responses   [facility] patient discharged from?  Non-BH   Does the patient have one of the following disease processes/diagnoses(primary or secondary)?  Non-BH Discharge   TCM attempt successful?  No   Unsuccessful attempts  Attempt 1          Gary Woodwrad RN    9/10/2021, 17:40 EDT

## 2021-09-11 NOTE — OUTREACH NOTE
Call Center TCM Note      Responses   [facility] patient discharged from?  Non-BH   Does the patient have one of the following disease processes/diagnoses(primary or secondary)?  Non- Discharge   TCM attempt successful?  No [verbal release for dtr]   Unsuccessful attempts  Attempt 2          Danae Bradshaw RN    9/11/2021, 08:34 EDT

## 2021-09-13 NOTE — TELEPHONE ENCOUNTER
Hub is instructed to read the documentation below to patient    ATTEMPTED TO CONTACT PATIENT'S DAUGHTER, DANIELLE, TO SCHEDULE MD F/U APPT.  NO ANSWER.  LMV ASKING HER TO CALL BACK.

## 2021-09-13 NOTE — OUTREACH NOTE
Call Center TCM Note      Responses   [facility] patient discharged from?  Non-BH   Does the patient have one of the following disease processes/diagnoses(primary or secondary)?  Non-BH Discharge   TCM attempt successful?  No   Unsuccessful attempts  Attempt 3          Antoinette Gudino RN    9/13/2021, 09:26 EDT

## 2021-09-14 NOTE — TELEPHONE ENCOUNTER
COULD WE SEND AN ORDER  TO [facility] FOR A ROLLING WALKER AND BEDSIDE COMMODE, PER LIYAH    THANK YOU

## 2021-09-15 NOTE — TELEPHONE ENCOUNTER
"                                                                 Cardiology Progress  Amelia Michel MRN: [id number]  1960  Primary care provider: Comfort Sabillon  ___________________________________          Date of Admission:  5/15/2017  Date of Service    5/23/17  Admitting Physician:  Yaya Slaughter MD  Attending Physician:  CARLOTTA Mclain MD   Discharging Service:  Cards 1      Primary Provider: Comfort Sabillon         Reason for Admission:   Tachycardia, palpitations    \"Ms. Michel is a 56 year old female who has a past medical history significant for VSD s/p repair 1969, RA, HTN, and insomnia. She presented to [facility] reporting tachycardia and palpitations. Presenting 12 lead ECG shows AF. She was given IV adenosine and diltiazem in the ER. She organized to an AFL and continues to be in AFL. She reports se first developed symptoms of palpitations and tachycardia about 3 weeks ago. She noticed her HRs up to 150 bpm and this lasted for about 48 hours and then her HRs returned back to baseline. She then had another 36 hour episode. Then most recently on 5/12/17, she developed the same symptoms again but this persisted and she developed fatigue and nausea. This prompted her to come in for evaluation. An echo today shows LVEF 40-45%, mildly reduced RVEF, moderate biatrial enlargement, mild mitral regurgitation, and moderate tricuspid regurgitation. Prior CMRI from 2015 showed normal function and no significant valvular abnormalities. She denies any chest pain, dizziness, lightheadedness, shortness of breath, or syncopal symptoms. Telemetry shows 's. Renal function and electrolytes stable. She continues to endorse palpitations.\"          Discharge Diagnosis:   Atrial fibrillation/Atrial flutter/pSVT  UTI  Deconditioning  Pleural effusion  Pancytopenia         Procedures & Significant Findings:   BRE   DCCV    TTE 5/17/17  Interpretation Summary  Normal left ventricular size and thickness with mildly " I put in orders.  Please fax to [facility].   reduced systolic  function. EF 45-50%.  Moderate tricuspid regurgitation. The mechanism is prolapse of the anterior  tricuspid leaflet.  The left atrial appendage is normal. It is free of spontaneous echo contrast  and thrombus.  Compared to the prior study 5/16/2017 there has been no change.    Cardiac MRI 5/22/17           Consultations:   EP         Hospital Course by Problem:    Amelia Michel is a 56 year old female with a hx of rheumatoid arthritis, VSD s/p repair 1969, hypertension, and insomnia that presents with tachycardia: Afib/Aflutter/pSVT.    # New Afib-->AFL/pSVT  CHADS-VASC of 2.   Patient failed DCCV after 24 hrs and it was complicated by hypotension requiring vasoactive agents. Reverted to Afib/flutter with RVR that did not respond to IV metoprolol and became hypotensive with IV diltiazem. She was started on sotalol and received 2 doses 80 mg BID. QTc not prolonged. She felt that it was causing her to feel nauseated and dyspneic. (additionally, patient is on hydroxychloroquine which is a QTc prolonging agent). Consequently, we were controlling her rates with diltiazem 24 hr 120 mg qday and metoprolol XL 50 mg. She was tolerating well and was scheduled to be discharged on 5/19 but subsequently (prior to leaving her room for d/c) reverted to svt with rates high 170's. Amiodarone IV bolus and loading was initiated and patient tolerated well. She was discharged on 400 mg BID x2weeks total and then 200 mg qday. Cardiac MRI was without evidence of an etiology for arrhythmia, no evidence of myocarditis. MRI did show severe bi-atrial enlargement with severe TR (prolapse anterior leaflet), mild/moderate MR. EP unable to ablate svt while patient is being treated for UTI. At time of discharge, patient was in NSR.  -Rate/Rhythm control: Discharged on diltiazem SA 120mg qday, metoprolol  mg, amiodarone 400 mg BID x2 weeks then 200 mg qday  -AC: Discharged on apixaban 5mg BID  -discontinued lisinopril  "due to hypotension during hospitalization and initiation of new medications  -Follow up with EP in clinic with Dr. Eaton within 2 weeks (after treatment of UTI) for discussion/planning for RFA.    # Fever  # UTI  Treating for ESBL susceptible to nitrofurantoin. 10 day course total.  -discharged on nitrofurantoin 100 mg BID x 8 more days  -follow up PCP 7 days    # Pleural effusion   R pleural effusion, small. Trace L pleural effusion. Suspect 2/2 sustained tachyarrhythmia x >5 days. Patient not interested in diagnostic thoracentesis at this time. Prefers to follow up with PCP with repeat CXR in 1 month.   -Repeat CXR 1 month and follow up with PCP    # Deconditioning  Patient has been in the hospital for >1 week, mostly in bed due to tachyarrhythmia.  Was seen by PT during admission with discharge to home with outpatient PT ordered.  -outpatient PT    #Pancytopenia, etiology unknown  May be 2/2 methotrexate use  -repeat cbc as outpatient follow-up with pcp    Physical Exam on day of Discharge:  Blood pressure 125/53, pulse 75, temperature 98.3  F (36.8  C), temperature source Oral, resp. rate 16, height 1.473 m (4' 10\"), weight 64.4 kg (141 lb 15.6 oz), SpO2 97 %, not currently breastfeeding.    Gen: NAD  Pulm: CTAB  CV: RRR, no mgr  Ex: wwp no edema          Pending Results:   none         Discharge Medications:     Discharge Medication List as of 5/23/2017  2:49 PM      START taking these medications    Details   nitrofurantoin, macrocrystal-monohydrate, (MACROBID) 100 MG capsule Take 1 capsule (100 mg) by mouth every 12 hours for 8 days, Disp-16 capsule, R-0, Local Print      !! amiodarone 400 MG TABS Take 400 mg by mouth 2 times daily for 12 days, Disp-24 tablet, R-0, Local PrintTake 400 mg BID x12 more days then reduce to 200 mg daily      !! amiodarone (PACERONE/CODARONE) 200 MG tablet Take 1 tablet (200 mg) by mouth daily, Disp-30 tablet, R-3, Local PrintStart this script after finishing loading with 400 mg " BID.      apixaban ANTICOAGULANT (ELIQUIS) 5 MG tablet Take 1 tablet (5 mg) by mouth 2 times daily, Disp-60 tablet, R-3, E-PrescribeTake for 3 mos then re-evaluate with cardiologist      diltiazem 120 MG 24 hr capsule Take 1 capsule (120 mg) by mouth daily, Disp-30 capsule, R-1, E-Prescribe       !! - Potential duplicate medications found. Please discuss with provider.      CONTINUE these medications which have CHANGED    Details   metoprolol (TOPROL-XL) 100 MG 24 hr tablet Take 1 tablet (100 mg) by mouth daily, Disp-30 tablet, R-3, Local Print         CONTINUE these medications which have NOT CHANGED    Details   Calcium Carb-Cholecalciferol 600-800 MG-UNIT TABS Take 2 tablets by mouth every morning, Historical      Multiple Vitamins-Minerals (WOMENS MULTIVITAMIN PO) Take 1 tablet by mouth daily At bedtime, Historical      Coenzyme Q10 (COQ-10 PO) Take 1 tablet by mouth daily In the morning, Historical      gabapentin (NEURONTIN) 100 MG capsule Take 2 capsules (200 mg) by mouth 3 times daily, Disp-180 capsule, R-3, E-Prescribe      pramipexole (MIRAPEX) 0.25 MG tablet Reported on 4/4/2017, Historical      traMADol (ULTRAM) 50 MG tablet Take 50 mg by mouth Every 6 hours as needed for pain, Historical      diphenoxylate-atropine (LOMOTIL) 2.5-0.025 MG per tablet Take 0.5 tablets by mouth 2 times daily , Historical      ASPIRIN PO Take 325 mg by mouth daily At bedtime, Historical      hydroxychloroquine (PLAQUENIL) 200 MG tablet Take 1 tablet by mouth twice daily on Monday, Wednesday, and Friday and 1 tablet once daily by mouth on Tuesday, Thursday, Saturday, Sunday., Historical      leflunomide (ARAVA) 20 MG tablet Take 20 mg by mouth daily In the morning, Historical      folic acid (FOLVITE) 1 MG tablet Take 1 mg by mouth daily., Historical      predniSONE (DELTASONE) 1 MG tablet Take 3 mg by mouth daily In the morning, Historical      METHOTREXate 2.5 MG tablet Take 10 mg by mouth once a week On Tuesdays,  Historical         STOP taking these medications       lisinopril (PRINIVIL,ZESTRIL) 30 MG tablet Comments:   Reason for Stopping:                  Discharge Instructions and Follow-Up:     Discharge Procedure Orders  Medication Therapy Management Referral   Referral Type: Med Therapy Management     Physical Therapy Referral   Referral Type: Rehab Therapy Physical Therapy     Reason for your hospital stay   Order Comments: Atrial tachycardia (afib/aflutter)     Activity   Order Comments: Your activity upon discharge: activity as tolerated   Order Specific Question Answer Comments   Is discharge order? Yes      Adult [facility] Follow-up and recommended labs and tests   Order Comments: Follow up with primary care provider, Comfort Sabillon, within 7 days to evaluate medication change, for hospital follow- up and regarding new diagnosis.  Follow up CXR in 1 month regarding right pleural effusion resolution.    Follow up with EP in clinic with Dr. Eaton in 2 weeks regarding ablation for new diagnosis atrial fibrillation/flutter with rvr, svt.   *Patient has been scheduled with Dr. Eaton (per the Cardiology Clinic)for his next available appointment on Monday, 7/5 at 8:00a. I have sent a message to see if she can be seen sooner with him*     Appointments on [place] and/or [facility] (with [facility] or [facility] provider or service). Call [phone] if you haven't heard regarding these appointments within 7 days of discharge.     Full Code     Diet   Order Comments: Follow this diet upon discharge: Cardiac   Order Specific Question Answer Comments   Is discharge order? Yes             Discharge Disposition:   Home with          Condition on Discharge:   Discharge condition: Stable   Code status on discharge: Full Code      Date of service: 5/23/2017    The patient was discussed with Dr. Mclain.    Verna Caba MD   PGY3  [phone]

## 2021-09-16 NOTE — PROGRESS NOTES
Hematology/Oncology Outpatient Follow Up    PATIENT NAME:Tahira Zimmerman  :1955  MRN: [id number]  PRIMARY CARE PHYSICIAN: Noemy Queen MD  REFERRING PHYSICIAN: Noemy Queen MD    Chief Complaint   Patient presents with   • Follow-up     Malignant neoplasm of right ovary      HISTORY OF PRESENT ILLNESS:     1. Recurrent ovarian cancer diagnosis established in 2013.    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  The patient was treated with surgery and then postoperatively with adjuvant chemotherapy consisting of carboplatin and Taxol.  Six months later, she had recurrence of disease intra-abdominally between the rectum and vagina, which was treated with intraabdominal peritoneal chemotherapy.  She had been free of disease since that time.  She reported feeling a mass in the left side of her abdomen approximately six months ago.  This gradually grew and in early 2013 she had her daughter feel the mass.  The daughter works for Dr. David Rogers and the patient at that time also complained of intermittent rectal bleeding.  Consultation with Dr. David Rogers was obtained.  The patient had a CT scan of the abdomen and pelvis performed on 13 revealing left lower quadrant mass measuring 9.7 x 9.3 x 6 cm demonstrating areas of dense calcification, soft tissue density and cystic density within it.  Stromal tumor is felt to be most likely a possibly fibroma.  It is generated with necrosis and calcification.  Possible palpable mass in the rectus muscle is seen with calcification and deformity of the rectus muscle and just below this a second mass intra-abdominally was seen measuring 2 cm in the greatest diameter suspicious for second intraabdominal tumor.  The mass separate from the largest mass measuring 2.6 cm in the dependent portion of pelvis is seen on the right of the midline.  No  retroperitoneal lymphadenopathy was seen.  No free air, free fluid or other abnormality was present.  The patient was scheduled for an outpatient colonoscopy, but had syncopal episode while sitting in the toilet the night before and was brought to the emergency room early in the morning by her daughter and son-in-law.  The patient had apparently stopped breathing for a few seconds and did not have a pulse, but started breathing before CPR could be initiated.  In the emergency room, the patient had an EKG revealing sinus rhythm nonspecific T-wave flattening; metabolic panel revealed a glucose of 148, creatinine of 1.3, CBC was normal.  She was treated with intravenous fluid.  The patient’s case was then discussed with Dr. Anabell Monique and patient was subsequently admitted to [facility].  The patient underwent a colonoscopy by Dr. David Rogers on 06/27/13 revealing sigmoid diverticulosis and grade II internal and external hemorrhoids, but no mass or colitis was seen.  CT-guided biopsy of the mass was performed on 06/27/13 as well revealing metastatic papillary adenocarcinoma with numerous psammoma bodies.  Pathology comment stated prior records were not available; however, with history of ovarian cancer the current biopsy would be consistent with metastases from that malignancy.  Oncology consult was obtained and I initially saw the patient on 06/27/2013 where the patient had claimed to have little bit of pain in the area of disease.  She reported being frequently constipated, denies any weight loss or fevers.  She did report having some night sweats, but thought that was because of her menopause.  On 06/27/13 metastatic workup including labs and CT scans were initiated.  CT scan of the chest on 06/27/13 revealed no acute disease in the chest.  A 1.4 cm size focal area of decreased density was noted in the lateral aspect of the right lobe of the liver consistent with a benign cyst or hemangioma.   There was a very small hiatal hernia measuring 2.2 cm in diameter.  A CT scan of the head performed 06/27/13 revealed no acute intracranial abnormality noted.  CA-125 was 40 units/ml (0-35), CA 19-9 was 11.8 units/ml (0-35), CEA level was 0.8 ng/ml (0-3), TSH level was 1.09 IU/ml (0.34-5.6).  The patient was in workup for the syncopal episode, a carotid Doppler was performed on June 28 revealing an essentially normal study showing a reduction in diameter from 0-15% bilaterally.  A Holter monitor was completed on 06/27/13, which was unremarkable except for a few premature atrial contractions.  The patient was felt stable and subsequently was discharged home on 06/28/13.  Post discharge, the patient was seen at [facility] on 07/05/13 by Dr. Kathryn Thrasher who discussed treatment options with the patient including surgery.  The patient is in the office today 07/16/13 in follow up post hospitalization.  She is reporting that surgery is planned for July 30th of this month at .  · 7/30/13 to 8/3/13 - The patient was in [facility].  The patient was admitted for surgery for her recurrent ovarian cancer.  The patient had exploratory laparotomy, omentectomy, bowel resection, liver biopsy and appendectomy.  Pathology revealed of the omentum metastatic serous carcinoma of the transverse colon, segmental resection showed metastatic serous carcinoma involving mesenteric fat.  The liver wedge resection showed fibrosclerotic thickening of the hepatic capsule.  Benign liver parenchyma.  No evidence of metastatic tumor.  Appendix showed fibrous obliteration of the lumen.  Negative for metastatic carcinoma.  Rectum and sigmoid colon segmental resection showed metastatic serous carcinoma invading the colorectal mucosa uninvolved by tumor.  Vaginal mass showed metastatic serous carcinoma.  Margins are positive for tumor.  · 9/24/13 - Chemotherapy orders were written for patient to start  carboplatin 550 mg IV day one and Taxol 330 mg IV day one to be cycled every three weeks.  · 10/10/13 - The patient started cycle #1 of chemotherapy consisting of Carboplatin and Taxol.  · 09/25/13 -  is 10.6 normal.  · 10/01/13 - CT of the chest, abdomen and pelvis revealed new reticular nodular occupancy in the anterior segment of the right upper lobe, could reflect an inflammation or infectious etiology or could reflect unusual persistence of metastatic tumor.  New liver lesions, the smaller one appears to be implant on the surface of the liver, the larger may also represent an implant including the intrahepatic.  Several small mesenteric nodes seen throughout the abdomen and pelvis.  · 10/02/13 - Port placed by Dr. Sen.  · 10/24/13 - Orders written to start Neupogen daily and if more than three Neupogen are needed to give Neulasta after next chemo.  ANC is 0.15.  · 10/31/14 - Patient given cycle 2 of chemotherapy with Taxol and Carboplatin.  · 11/21/13 - The patient started cycle 3 of chemotherapy consisting of Carboplatin and Taxol.  · 11/26/13 - CT scan of chest, abdomen and pelvis revealed no evidence of active disease in the chest.  Reticulonodular opacity has resolved.  Metastatic lesion impressing upon the hepatic dome similar to prior exam.  No evidence of a change.  No evidence of new disease.  Postsurgical changes in the pelvis and throughout the retroperitoneum in the large bowel.  Finding containing anterior abdominal wall hernia containing fat tissue and enhancing vessels, 3 cm in diameter.  · 12/2/13 - Orders written to start Neupogen per protocol as well as Aranesp due to low hemoglobin and ANC.  ANC is 0.14.  Hemoglobin is 9.7.  · 12/11/13 - Orders written to hold chemotherapy until next week and decrease dose by 20% due to low platelet count.  Platelet count is 85,000.  · 12/16/13 - The patient received cycle 4 of Carboplatin and Taxol at a 20% dose reduction so Taxol dose is 260 mg and  Carboplatin is 440 mg.  · 12/16/13 - CA-125 12.6 (N).  · 12/19/13 - PET/CT scan.  Impression:  1. There is no evidence of hypermetabolism in the liver.  Specifically of the dominant lesion in or adjacent to the right hepatic dome that was seen and described on the recent CT study of 11/26/2013.  It is photopenic relative to the surrounding liver.  2.  There is no evidence of hypermetabolic soft issue mass lesion or free fluid in the abdomen or pelvis.  3.  The tonsillar tissues are slightly enlarged and have moderate activity level.  This maybe physiologic.  Correlation with oral cavity, examination is recommended.  4.  Mild amount of activity in the right level II jugular lymph chain without focally enlarged lymph nodes is of questionable significance.  · 1/6/13 - The patient received cycle 5 of chemotherapy with Taxol and Carboplatin.  · 1/27/14 - The patient started on cycle 6 of Taxol and Carboplatin.  · 2/18/14 - CT of the abdomen and pelvis with contrast; stable lesions impressing upon the hepatic dome, unchanged compared to the prior exam.  Multiple anterior abdominal wall hernias re-demonstrated.  Those above the umbilicus contain omental fat.  Below and to the left of the umbilicus as anterior abdominal hernia containing omental fat in nondilated small bowel which is larger than seen previously.  · 2/20/14 - The patient received cycle 7 of chemotherapy with Taxol and Carboplatin.   · 3/11/14 - Order written to discontinue chemotherapy due to patient has completed 7 cycles of chemotherapy and CT scans suggest possible remission.  · 3/11/14 -  11.0 (N).  · 06/05/14 - CT scan of the chest abdomen and pelvis with contrast: There are multiple anterior abdominal wall hernias.  There are 2 larger anterior abdominal wall hernias at the level of the transverse colon, which contain omental fat.  There are at least 2 small anterior abdominal wall hernias that contain omental fat.  There is a moderate sized  anterior abdominal wall hernia at the level of the umbilicus, eccentric to the left, which contain non-dilated bowel.  Interval decrease in the size of two deposit impressing on the liver.  The third tiny deposit has been stable.  · 8/19/14 -  10.4 (N).  · 12/19/14 -   10.0 (N)  · 1/7/15 - CT chest, abdomen and pelvis with stable appearance of the chest with several micronodules. Small hiatal hernia, slightly larger than previous exam, increased from 1.5 to 2.5 cm in diameter. Bochdalek hernia unchanged. Multiple hepatic lesions. The two dominant lesions at the right hepatic dome had decreased in size from previous. The remaining tiny nodules measured 3-5 mm in diameter and were unchanged. There was no evidence of new intra-abdominal or pelvic mass or free fluid. There were multiple anterior abdominal wall hernias which had increased in size.   · 7/27/15 - Comprehensive metabolic panel with no significant abnormalities. CA-125 of 21 (0-35).   · 10/26/15 - WBC 6, hemoglobin 13, platelet count 204,000. Heart rate 47. CA-125 of 20 (0-35).    · 2/18/16 - CT chest with contrast with stable micronodular density seen in the lungs without significant change from prior exam. CT abdomen and pelvis with regression of liver lesions with no new evidence of metastasis. Multiple ventral hernias again noted without significant change from prior exam. Creatinine 0.9 (0.6-1.3).    · 2/25/16 - Patient hospitalized at [facility] between 2/25/16 and 2/27/16 with pyelonephritis secondary to E-coli. She was given two days of IV Rocephin and then placed on oral Levaquin. She was asked to hold her Coumadin, but then had nausea and vomiting because of Levaquin and stopped the Levaquin also. Her CA-125 was 32 (0-35) and CEA 1.3 (0-5). Her urine grew >100,000 E-coli. CT of the abdomen and pelvis was done which revealed 4.1 x 1.8 cm sized mild ovoid area of decreased density in the liver parenchyma along the anteromedial  aspect of the dome of the right lobe of the liver, unchanged significantly since the previous study of 2/18/16. There was suspicion of a very small pericardial effusion. There was suspicion of a small hiatal hernia. There were several hernias in the anterior abdominal wall. A 3.5 x 3.1 cm incised well-circumscribed abnormal density in the left retroperitoneal fatty soft tissue at the level of the left iliac crest was suggestive of tumor recurrence. There was an abnormal density in the left retroperitoneal soft tissues in the left flank that partially surrounded the left kidney and extended downward to the left pelvic area. Diverticulosis of the distal descending colon and sigmoid colon were seen.     · 3/8/16 - Patient prescribed Bactrim DS 1 p.o. b.i.d. for 10 days for pyelonephritis, which was partially treated with Rocephin and Levaquin. Urine with 40,000 E-coli treated with Macrobid for 7 days.  of 20 (0-35).    · 3/31/16 - PET scan with area of low density anteriorly in the right lobe of the liver with no significant radiopharmaceutical uptake to suggest malignancy. Multiple round soft tissue density masses showing increased radiopharmaceutical activity and multiple anterior abdominal wall hernias.   · 5/10/16 - Patient complains of venous enlargement of the left chest wall around the port. Has not been seen by  yet, but has an appointment on 5/23/16. Complained of a cough.   · 5/12/16 - Infusaport contrast study with no evidence of fibrin sheath. Chest x-ray with mild cardiac prominence.   · 5/23/16 - Patient seen in consultation by Shneg Bowman M.D. at [facility] and a chemotherapy trial recommended.   · 6/1/16 -   of 27.1 (0-35).    · 6/14/16 - WBC 5.71, hemoglobin 12.4, platelet count 188,000. Patient is scheduled for surgery at  on 6/16/16 after further discussion with  physicians. Discussed adjuvant chemotherapy.   · 6/16/16 - Excisional biopsy of abdominal wall mass  performed by Sheng Bowman M.D. at [facility] with pathology revealing metastatic high-grade papillary serous carcinoma.   · 7/7/16 - Received a call from the patient that Tramadol was not working and that she was given Norco #24 after her biopsy at  which did help her pain. Patient prescribed Norco 5/325 one p.o. q. 4-6 hours p.r.n. #60 with no refills. Echocardiogram with left ventricular inferior wall hypokinesis with ejection fraction of 50-55%.   · 7/8/16 - Patient seen in consultation by Sheng Bowman M.D. in consultation at  for metastatic high-grade papillary serous carcinoma diagnosed by excisional biopsy on 6/16/16 with carcinomatosis and patient not a surgical resection candidate. Genetic sequencing and susceptibility testing of tumor was ordered. Biopsy was done of anterior abdominal wall.   · 7/7/16 - Echocardiogram with left atrial enlargement. Mild mitral regurgitation. Left ventricular proximal inferior wall hypokinesis with ejection fraction of 50-55%.   · 7/11/16 - While waiting for genomics results, in order not to delay treatment further, order written for Taxotere 60 mg/M2 IV over one hour followed by Carboplatin AUC 5 over one hour, cycles to be repeated every three weeks.  Comprehensive metabolic panel normal.  26 (0-35).    · 725/16 - Pain contract signed for use of Norco.   · 8/5/16 - Patient stated that she experienced a red rash and was itchy post taking Norco. Norco discontinued and Tramadol refilled.   · 8/16/16 - Patient started cycle 1 chemotherapy. WBC 7.1, hemoglobin 11.2, MCV 88.7, platelet count 94,000. Patient complained of nausea for a week post chemo. Already getting Emend, Aloxi and Decadron. Added Omeprazole 40 mg daily orally.   · 8/17/16 - Urine culture with >100,000 E-coli.   · 8/25/16 - Cycle 2 chemotherapy given.   · 9/9/16 - Magnesium 1.3, replaced intravenously. Potassium 3.4 (3.6-5.1), increased oral potassium supplementation.    · 9/16/16 -  Cycle 3 chemotherapy given.   · 9/19/16 - Comprehensive metabolic panel with no significant abnormality.   · 9/20/16 - CT abdomen and pelvis with evidence of progressive metastatic disease in the abdomen and pelvis with interval enlargement of several soft tissue attenuations and subcutaneous nodules in the anterior abdominal wall. Interval enlargement of a left pelvic soft tissue attenuation mass. A lentiform area of low attenuation in the dome of the liver stable in size. Multiple anterior abdominal wall hernias containing fat and/or bowel. Marked pelvic floor laxity. CT chest negative for evidence of metastatic disease. Tiny pulmonary nodules in the right lung stable since 2013 consistent with a benign etiology.   · 9/23/16 - Macrobid 100 mg p.o. b.i.d. x7 days prescribed for UTI. Current chemotherapy discontinued after discussion and new chemotherapy orders written. Carboplatin AUC-5 IV day 1 and Doxil (Liposomal Doxorubicin) 30 mg/M2 IV day 1 to cycle q. 21 days.  of 18 (0-35). Magnesium 1.6, replaced intravenously. Comprehensive metabolic panel with potassium 3.4 (3.6-5.1).     · 10/13/16 - Patient started on cycle 1 of Carboplatin and Liposomal Doxorubicin followed by Neulasta.   · 11/3/16 - Cycle 2 Carbo and Doxil given.   · 11/17/16 - Magnesium 1.0, replaced intravenously. Oral potassium supplement increased.   · 11/29/16 - CT chest with small non-calcified right pulmonary nodules unchanged. Benign bone island in the midthoracic vertebral body along with benign bony hemangiomas in the middle thoracic vertebral bodies. CT abdomen and pelvis with mild progressive metastatic disease from CT of September 2016. Anterior abdominal wall mass superiorly mildly increased in size with new area noted as described measuring 3 x 1.7 cm. 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 Stable area of decreased uptake in the dome of the liver not hypermetabolic on recent PET  scan, likely representing cyst or focal fatty infiltration. Stable small hiatal hernia, fat-containing Bochdalek’s hernia and anterior abdominal wall hernias with stable pelvic floor relaxation.    · 12/1/16 - Cycle 3 chemotherapy given.   · 12/8/16 - Current chemotherapy discontinued and patient started on Gemcitabine 1000 mg/M2 IV days 1, 8, 15 q. 28 days.   · 12/22/16 - Patient seen in followup by Juliana Roy M.D. at the pain clinic, treated with Oxycodone and Lyrica. Transaminases normal. Patient started cycle 1 chemotherapy.   · 12/29/16 - Magnesium 1.1 (1.8-2.5), replaced intravenously.   · 1/5/17 - Cycle 1 day 15 chemotherapy held as patient leaving for vacation to [place]. Chemotherapy dose decreased by 20%. Patient complains of diarrhea for which Imodium prescribed.   · 1/11/17 - BRCA-1 and BRCA-2 negative.   · 1/12/17 - Echocardiogram with ejection fraction 60% or greater.   · 1/19/17 - Comprehensive metabolic panel with no significant abnormality. Patient started cycle 2 chemotherapy.   · 2/2/17 - Urine with >100,000 E-coli treated with Cipro 500 mg p.o. b.i.d. x1 week.   · 2/6/17 - Magnesium 1.1 (1.8-2.5), replaced intravenously.    · 2/23/17 - Patient started cycle 3 chemotherapy.   · 2/27/17 - CT chest with interval development of too numerous to count very small pulmonary nodules, ill-defined ground glass opacities concerning for development of pulmonary metastatic disease. Stable left-sided chest port. CT abdomen and pelvis with stable appearance of multiple small soft tissue densities within the abdomen near midline subcutaneous fat of the abdominal wall. Interval decrease in size of largely calcified left pelvic mass and multiple fat in bowel containing small ventral hernias.   · 3/6/17 - Pulmonary consultation obtained for lung nodules. Discussed CT results with patient. Decision made to continue present chemotherapy until further lung evaluation as abdominal disease better.  of 18  (0-35).    · 3/16/17 - Patient started cycle 4 chemotherapy, but treatment held from day 8 onwards because of hospitalization.   · 3/19/17 - Patient was hospitalized at [facility] between 3/19/17 and 3/25/17 with chest pain. Chest x-ray had revealed increased mild bibasilar airspace disease. Troponin I and EKG’s were negative. INR was elevated. Patient was treated with antibiotics. EGD was performed revealing small hiatal hernia and esophageal dilatation was performed. Bronchoscopy was performed also with no intrabronchial lesions identified. IgA was 51 (), IgG 320 (600-1500) and IgM 19 (). Lexiscan nuclear stress test had no evidence of reversible myocardial ischemia with normal left ventricular ejection fraction of 58%. CT chest had development of patchy bilateral ground glass opacities most pronounced involving the left upper lobe and bilateral lower lobes. Multiple tiny bilateral pulmonary nodules were less conspicuous. The patient underwent a bronchoscopy by Jerica Esparza M.D. with right upper lobe bronchoalveolar lavage revealing benign squamous cells and bronchial cells with pulmonary macrophages present. There was mild acute inflammation. Negative for malignant cells. Prilosec was changed to Famotidine for hypomagnesemia.    · 4/6/17 - Magnesium 1.2 (1.8-2.5). Comprehensive metabolic panel with no significant abnormality. Patient seen in follow-up by Fito Ram M.D. at [facility]. Treated with Lyrica and Oxycodone.    · 5/4/17 - Patient complains of a rash itching on her right upper arm. Eczematous rash noted and patient prescribed Cyclocort 0.1% b.i.d. Magnesium infusions continued with each chemo. Order written to resume chemotherapy.   · 5/16/17 - Cycle 5 chemotherapy started.   · 5/26/17 - Magnesium 1.4 (1.8-2.5), replaced intravenously. Potassium 2.9 (3.6-5.1), KCL increased to 20 mEq three in the morning and three in the evenings.   · 5/30/17 - PET scan with remaining metabolic  activity within the nodular areas. The suggested mass which is partially calcified and necrotic within the left aspect of the pelvis seems slightly larger with increased metabolic activity. There was more calcification in these areas compared to prior study, suggesting inflammation.      · 6/8/17 - Patient complaining of shortness of breath. Does take HCTZ at home. Chest x-ray ordered and chemotherapy continued along with weekly magnesium replacement. Chest x-ray with no acute cardiopulmonary abnormalities.   · 6/13/17 - Patient received cycle 6 of chemotherapy.   · 7/31/17 - WBC 5.0, hemoglobin 11.2, MCV 89.7, platelet count 217,000. Patient is to resume chemotherapy starting with cycle 7 on Wednesday of this week.  of 19 (<35). Potassium 3.3 (3.5-5.3).     · 8/2/17 - Patient began cycle 7 of chemotherapy.   · 8/30/17 - Patient started cycle 8 chemotherapy.   · 9/5/17 - Patient seen in followup at Pain Management by Fito Ram M.D. and continued on treatment with Oxycodone and Lyrica.   · 9/13/17 - Patient was hospitalized at [facility] for a day with dizziness secondary to dehydration, hypokalemia and anemia. She was given 1 unit of packed red blood cells and electrolytes were replaced. Chest x-ray revealed a subtle opacity in the left lateral lung base favored to represent atelectasis. Magnesium 1.3 (1.5-2.5), patient continued on replacement.    · 9/22/17 - Chemotherapy and magnesium replacement continued with decrease in chemotherapy dose by 10% secondary to cytopenias.   · 9/25/17 - Cycle 9 chemotherapy started.   · 10/12/17 - CT of the chest with contrast showed several tiny pulmonary nodules. One within the right lower lobe appears new from prior exams. Two adjacent foci of nodularity within the medial right lower lobe suggestive of minor infectious or inflammatory process. CT of the abdomen and pelvis with contrast which showed soft tissue nodules along the anterior abdominal and pelvic walls  unchanged from previous scan. Also a partially calcified mass within the left pelvis unchanged. No acute process identified within the abdomen or pelvis. Several left paracentral ventral hernias unchanged. No evidence of acute bowel obstruction.   · 10/16/17 - Order written for Levaquin 500 mg p.o. daily as the patient states that she has had a productive cough, fever and chills and with the results of the CT scan showing a possible infectious versus inflammatory process. Order also written to continue chemotherapy and magnesium replacement as previously prescribed.   · 10/23/17 - Cycle 10 chemotherapy started.   · 11/19/17 - Patient went to the Emergency Room at [facility] [facility] of right lower extremity redness and fever for a day. Venous Doppler had no evidence of a DVT and patient was discharged home on Clindamycin.   · 11/21/17 -  of 17 (0-35).   · 12/4/17 - Cycle 11 chemotherapy started.   · 12/20/17 - PET scan with multiple hypermetabolic soft tissue nodules abutting the anterior abdominal wall, stable from previous examination. Peripherally calcified left lower quadrant mass near the ascending colon stable in size demonstrating no hypermetabolic uptake. Previously had maximum SUV of 7. Right medial basilar pulmonary nodules resolved.   · 12/22/17 - CT left lower extremity with soft tissue swelling with skin thickening present along the anterior and medial aspect of the leg, slightly more pronounced around the palpable marker seen within the upper medial aspect of the lower extremity.   · 12/26/17 - Elastic stockings prescribed for lower extremity edema.   · 1/8/18 - Patient hospitalized at [facility] between 1/8/18 and 1/11/18 with fever of up to 101 degrees and painful rash on the lower extremities of several weeks’ duration. She was found to be hypokalemic and MRI of the lower extremities revealed thickening and swelling. Chest x-ray had no acute cardiopulmonary abnormality. Skin biopsy was performed by  Surgery revealing stasis dermatitis and no evidence of malignancy. Infectious Disease consultation was obtained and IV antibiotics were stopped. Blood cultures had no growth.   · 1/22/18 - Patient asked to start wearing elastic stockings which she has not started yet. She was given a prescription for Dyazide 1 p.o. daily.   · 2/26/18 - Ordered chemo to resume again. Patient unaware that she was supposed to resume chemo after her last visit in January. Continue magnesium infusions on day 1, 8 and 15. Prescribed Levaquin 500 mg p.o. daily x10 days for productive cough x1 week. History of viral illness consistent with influenza.  of 18 (0-35). Chest x-ray with no acute process.    · 3/5/18 - Cycle 12 chemotherapy started.   · 3/26/18 - Options discussed with patient. Decision made to discontinue IV Gemzar and orders written to start on Niraparib (Zejula) 300 mg p.o. daily as maintenance therapy.   · 4/11/18 - Niraparib discontinued due to insurance denial. Orders written to start Carboplatin-AUC 5 IV day 1 cycling every 21 days. Orders written for Neulasta 6 mg subcutaneous kit day 1 with palliative treatment intent and expected duration of treatment three months.   · 4/12/18 - CT chest, abdomen and pelvis with contrast revealed numerous tiny centrilobular nodules in the lungs favored to reflect infectious or inflammatory process. Nodules ranged 1-3 mm in size and are new from prior studies with metastatic disease not entirely excluded. Stable small hiatal hernia. Interval progression of metastatic disease involving the subcutaneous tissues at the ventral abdominal wall. Multiple soft tissue density nodules previously shown to be hypermetabolic on PET scan. New small crescent of low attenuation material along the periphery of the spleen with similar finding at the dome of the liver. Findings are concerning for peritoneal metastases. Density of the dome of the liver remained unchanged from multiple prior studies.  Stable calcified mass in the left pelvic sidewall. Urinary bladder wall thickening.   · 4/23/18 - Cycle 1 chemotherapy with Carboplatin administered along with Neulasta injection.   · 4/26/18 - Neulasta discontinued due to insurance denial.   · 5/14/18 - Patient received cycle 2 Carboplatin.   · 6/5/18 - Cycle 3 day 1 chemotherapy with Carboplatin administered.   · 6/20/18 - CT chest, abdomen and pelvis at [facility] showed re-demonstration of soft tissue mass in the ventral wall hernia left of the midline as well as the dome of the liver, likely representing metastatic disease similar as compared to the prior study. Additional calcified lesions present in the upper left hemipelvis and along the anterior abdominal wall to the right of the midline also likely representing metastatic disease. No definite new lesions were identified. Moderate to large stool burden likely related to mild constipation was present. No suspicious lung nodules were identified. The previously-described ground glass nodules appeared to have resolved. There was a stable subpleural nodule in the right upper lobe measuring 3 mm present since 2014 without significant changes.   · 6/26/18 - Cycle 4 day 1 Carboplatin administered with addition of Neulasta for febrile neutropenia prophylaxis.   · 6/29/18 - Reviewed CT scans with patient. Orders written to discontinue Carboplatin and Neulasta and plan to start Niraparib 300 mg by mouth daily as maintenance therapy. Prescribed Amlodipine 2.5 mg p.o. daily for chemo-induced hypertension.   · 7/18/18 - Orders written to increase weekly Magnesium Sulfate to 3 g IV weekly due to continued hypomagnesemia.   · 8/1/18 - Patient reports she has not received Niraparib (Zejula) to date.   · 8/30/18 - Patient’s phone was not working so though Niraparib approved, the drug company had not been able to get ahold of her. Patient supplied with drug company number so that she can start taking the pills.    · 9/6/18 -  of 20 (N).   · 9/18/18 - Urinalysis done for dysuria and back pain. Urine culture grew 20,000 to 50,000 colonies of urogenital ruy. Prescribed Bactrim DS one tablet twice daily for one week.   · September 2018 - Patient initiated on Zejula 300 mg p.o. daily.   · 10/1/18 - WBC 3.5, hemoglobin 12, platelet count 74,000. Niraparib (Zejula) dose held and then resumed when platelets above 100,000 at a dose of 200 mg daily.   · 10/15/18 - Patient received Niraparib (Zejula) at 200 mg p.o. daily with a platelet count of 198,000.   · 11/7/18 - WBC 6, hemoglobin 11.6, MCV 96.2, platelet count 137,000. Patient claims to have stopped taking Niraparib a few days prior because of cough. Asked to resume taking it. Ciprofloxacin 500 mg p.o. twice daily for one week for bronchitis.   · 12/3/18 - Magnesium Sulfate infusions changed to every two weeks, to send mag level before and give 3 grams. DC’d Magnesium Oxide and started Magnesium Plus Protein two pills twice daily.   · 1/4/19 - CT chest, abdomen and pelvis with new patchy nodular areas of airspace consolidation within the bilateral upper lobes, left greater than right, favored to be infectious or inflammatory. Interval enlargement of the peritoneal soft tissue masses within the pelvis compatible with progression of disease. Enlarging ventral hernia now containing multiple loops of small bowel and colon.   · 1/7/19 - Patient has not been coming in for weekly magnesium replacement as nursing has not been able to get ahold of her. Results of CT’s discussed with patient. Decision made to change her to IV chemotherapy with Carboplatin AUC-5 day 1 and pegylated liposomal Doxorubicin (Doxil) 30 mg/M2 IV day 1 to cycle every four weeks. Patient made aware of the limited choices of her treatment available.   · 1/31/19 - Echocardiogram showed LVEF 50-55% and otherwise normal echo Doppler study.   · 2/4/19 - New chemotherapy not initiated to date with difficulty  contacting patient for echocardiogram. Neulasta On-Pro 6 mg ordered with chemotherapy due to extensive prior exposure to chemotherapy, increasing risk of febrile neutropenia, history of neutropenic events on prior chemotherapy regimens.   · 2/15/19 - Patient started cycle 1 chemotherapy with Neulasta support.   · 3/6/19 -  of 28 (0-35).   · 3/15/19 - Cycle 2 Carboplatin with Liposomal Doxorubicin (Doxil) and Neulasta support initiated.     · 4/10/19 - Patient was requested to followup with Dr. Queen regarding hypotension and blood pressure medication dosing. Patient appears to be tolerating treatment well with cytopenias not requiring dose adjustments. No Doxil-related skin or mucus membrane toxicities or other significant toxicities to date.   · 4/12/19 - Cycle 3 chemotherapy given.   · 4/16/19 - CT chest, abdomen and pelvis with no evidence of active metastasis to the chest. Several tree-in-bud nodules within the periphery of the inferior right upper lobe likely infectious or inflammatory. Disease burden within abdomen and pelvis stable to slightly increased from prior CT. Specifically, a soft tissue mass along the right rectus muscle slightly increased in bulk. Multiple ventral hernias, some containing loops of bowel, with no evidence of acute bowel obstruction.   · 5/8/19 - Discussed CT results with patient. Overall stable disease and would like to continue same treatment. Dove soap and Hydrocortisone Cream p.r.n. prescribed for scattered rash on back.   · 5/10/19 - Cycle 4 Carboplatin and Liposomal Doxorubicin initiated.   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 It was ER positive, HER2/zuleima negative, PD-L1 negative. There was TOPO1 positivity and TUBB3 positivity. TMB was low (two mutations per megabyte MUTS/MB) and MSI was stable. There were 13 therapies considered with increased benefit. The 13 therapies with increased benefit  included Fulvestrant, Irinotecan, Letrozole, Topotecan, Anastrazole, Exemestane, Tamoxifen, all of which were referenced to [facility] as well as Abemaciclib, Everolimus, Gefitinib, Palbociclib, Ribociclib and Toremifene.     · 6/5/19 echocardiogram with preserved LV systolic function with EF around 60%.  · 6/7/19 cycle 5 chemotherapy with Neulasta support given.  Patient continued on mag oxide 400 mg p.o. twice daily and weekly IV 3 g mag sulfate infusions for persistent hypomagnesemia.  · 7/5/2019- cycle 6 chemotherapy with carboplatin and doxorubicin with Neulasta port initiated.  Ca125:  21 (0-35).  · 7/23/2019-CT chest abdomen and pelvis compared to CT from 4/16/2019 revealed multiple small pulmonary nodules unchanged from prior examination within the right upper and left lower lobes.  Complex ventral hernia similar to prior exam.  Soft tissue nodule along the right lateral margin of the right of the midline measuring 12 x 10 mm unchanged in size.  Irregular soft tissue nodular thickening along the margin of the most inferior aspect of the complex ventral hernia with thickness measuring up to 13 mm similar to prior examination.  Extensive calcified and soft tissue mass within the left lower quadrant decrease in size now measuring 2.6 x 2.3 cm previously 3.0 x 2.5 cm.  Partially calcified mass involving the right rectus sheath measuring 3.1 x 2.7 cm previously measured 3.3 x 2.9 cm.  Densely calcified mass measuring 2.1 x 1.6 cm unchanged previously measured 2 x 1.7 cm.  Right external iliac chain lymph node measuring 9 mm previously measured 5 mm.  · 8/2/2019 cycle 7 chemotherapy given.  · 8/30/2019-cycle 8 chemotherapy with carboplatin and doxorubicin with Neulasta support given.  · 9/27/2019 cycle 9 chemotherapy given.  · 10/1/19 - Echocardiogram showed Normal LV size and contractility EF of 60-65%. Normal RV size. Borderline left atrial enlargement. Aortic valve, mitral valve, tricuspid valve appears structurally  normal, no significant regurgitation seen.No pericardial effusion seen. Proximal aorta appears normal in size.  · 10/18/19 - Magnesium 1.5 (1.8-2.5).  IV magnesium replacement continued.  · 10/25/2019 cycle 10 chemotherapy given.  · 10/29/2019 patient had CT scan of the chest abdomen and pelvis-there is a new 2 mm nodule in the left lower lobe with a stable 2 mm nodule in the left lower lobe.  Follow-up in 6 months was recommended.  Stable multiple soft tissue density and calcified nodules within the ventral abdominal wall and left retroperitoneal fat consistent with nonviable metastatic disease.  These nodules have either decreased in size or stable.  · 11/22/2019 10 received cycle 11 of chemotherapy with Doxil and carboplatinum with Neulasta  · 12/8/2019 to 12/11/2019 patient was admitted to the hospital for neutropenic fever.  · 12/20/2019 patient received cycle 12 of chemotherapy with Doxil and carboplatinum with Neulasta  · 1/13/20: 2 D echo was normal with EF 56% to 60%  · 1/24/20-Patient received cycle 13 of combination chemotherapy with carboplatinum and Doxil  · 2/3/2020 patient had a  which was 25.4  · 2/21/2020-patient received cycle 14 of chemotherapy with carboplatinum and Doxil  · 3/6/2020 patient had CT scan of the chest, abdomen and pelvis this revealed a 3 mm nodule in the left lower lobe present on prior CT of the abdomen unchanged from July 2019.  Stable 3 mm right upper lobe nodule.  There is a lymph node in the AP window measuring 8 x 9 mm prominent left hilar lymph node measuring 12 mm previously was 7 x 7 mm no significant adenopathy was seen in the abdomen there is an area of irregular soft tissue in the left paramidline ventral hernia which is stable measuring 5.7 cm partially calcified mass in the right rectus measuring 3 x 2.5 cm which is also stable the prominent lymph nodes in the left mid hilum and mediastinum may be reactive or metastatic disease  · 4/17/2020-patient had cycle 15  of single agent carboplatinum  · 5/26/2020 patient had CT scan of the chest, abdomen and pelvis showed 3 new lung nodules measuring 7 mm in the left upper lobe, 5 mm in the left lower lobe and 4 mm in the right lower lobe.  There were additional small lung nodules which were unchanged.  Mildly prominent mediastinal and bilateral hilar lymph nodes mildly increased in size.  Right hilar node 9 mm previously was 5 mm.  Carinal node 9 mm previously was 6 mm.  The nodule at the right side of the hernia sac has increased to 1.5 cm from 1.2 cm.  Also a nodule in the subcutaneous fat currently measures 2.4 was previously 2 cm.  · 5/15/2020-patient received cycle 16 of carboplatinum  · Since the last visit in the office patient patient developed dizzy spell and fell. For that reason she was taken to the emergency room at [place].    · 7/3/2020 she had brain MRI which showed an 8 mm focus of abnormality in the left aspect of the posterior fossa corresponding to a dural based enhancing lesion thought to represent a calcified meningioma vessels calcified dural based metastasis.  This lesion has a slight local mass-effect and a small amount of surrounding edema.  There is also a 4 to 5 mm rounded focus of abnormal nodular enhancement along the cortex of the left parietal lobe but no significant mass-effect.  This is nonspecific could represent neoplastic process, infectious/inflammatory process or granulomatous disease.  · Patient was seen by neurosurgery, follow-up brain MRI in 8 weeks has been recommended by Dr. Hartamn  · 7/9/2020 patient was initiated on single agent topotecan cycle 1 day 1  · 7/16/2020 she received the 8 topotecan  · 8/6/2020 patient received cycle 1 day 15 of topotecan  · 9/2/2020 patient had brain MRI multiple hospital which showed interval increase in size of the metastasis in the left parietal lobe now measuring 9 x 7 mm.  Previously was 5 mm.  There has been interval increase in size of the left superior  cerebellar metastases now measuring 1.3 cm previously was 8 mm.  There was no new metastatic disease identified.  · 9/11/2020 patient received cycle 2-day 15 of single agent topotecan  · Was admitted to the hospital 10/1/2020 for supratherapeutic INR  · 10/13/2020 patient was seen by Dr. Delong she has started stereotactic brain radiation to be completed on 10/28/2020  · 11/6/2020 patient received cycle 3-day 1 of chemotherapy with topotecan  · 11/13/2020 patient received cycle 3-day 8 of topotecan  · 11/30/2020 patient had CT scan of the chest, abdomen and pelvis this showed new reticular nodular interstitial thickening within the right lung mostly in the right middle lobe and right lower lobe worrisome for lymphangitic spread of cancer.  Evidence of progression of metastatic disease in the chest, abdomen and pelvis the new tiny sclerotic foci within the spine worrisome for osseous metastatic disease.  · 12/17/2020 - Discontinued topotecan due to progressive disease with orders written to begin Alimta 500 mg per metered squared IV q. 21 days  · 12/30/2020 - Started cycle 1 day 1 Alimta   · 1/18/2021 patient had bone scan which showed chest metastatic disease.  Xgeva has been ordered  · 3/12/2021 patient received cycle 1 of combination chemotherapy with cisplatinum and  Gemzar  · 4/23/21: Patient had cycle 2 day with cis-platinum and Gemzar  · 4/30/2021: Patient received cycle 2-day 8 of chemotherapy with cis-platinum and Gemzar  · 5/6/2021 patient had a CT scan of the chest which shows evidence of progressive disease  · 6/15/2021: Patient was admitted to the hospital for supratherapeutic INR, probable pneumonia.  She has since been discharged.  · 7/9/2021 patient received cycle 1 day 1 of single agent vinorelbine  · 7/16/2021 patient received cycle 1 day 8 of single agent Navelbine  · 8/9/2021 patient received cycle 2-day 1 of single agent vinorelbine  · August 21 patient was hospitalized for COVID-19 infection with  respiratory failure     2. Deep vein thrombosis of left upper extremity diagnosis established in October of 2013.  · 10/16/13 - Venous Doppler of left upper extremity.  Impression:  Deep vein thrombosis involving the subclavian, axillary and brachial veins on the left side, superficial vein thrombosis involving the left basilic vein.  · 10/16/13 - Order written for Lovenox 120 mg subcutaneously daily until INR is in therapeutic range.  Order written for Coumadin 5 mg p.o. daily.  The patient is to follow PT and INR protocol.  · 5/20/16 - Venous Doppler bilateral lower extremities normal.  · 7/30/19 - Patient continued on Coumadin following the INR protocol.      3. Anemia diagnosis established in November 2013 and pernicious anemia diagnosis established March 2016.   · 11/15/13 - Hemoglobin is 7.8.  · 12/2/13 - Orders written to start Aranesp 200 mg subcutaneous every two weeks due to low hemoglobin secondary to chemo induced anemia.  Hemoglobin is 9.7.  Anemia workup is ordered.  · 12/03/13 - Ferritin 179.8 (N), folic acid 8.3 (N), vitamin B12 314, iron 104 (N), TIBC 298 (N), iron saturation 35 (N), Reticulocyte count 0.88 (N).  EPO level 124 (N).  Haptoglobin 22 (H).  · 10/22/14 - Hemoglobin 12.5, hematocrit 37.3, MCV 91.6.  · 10/23/14 - Anemia resolved.   · 3/8/16 - WBC 5.8, hemoglobin 11.7, MCV 90.3, platelet count 245,000. Vitamin B12 of 189 (180-914), ferritin 61 (), iron 91 (), TIBC 345 (228-428).   · 3/15/16 -  ().        · 3/22/16 - Intrinsic factor antibody positive, antiparietal antibody negative. Vitamin B12 at 1000 mcg IM weekly started, but the patient after receiving first dose on 3/22/16 did not come back for injections until 4/13/16.   · 4/13/16 - WBC 5.1, hemoglobin 12.5, MCV 87.9, platelet count 201,000. Patient given B12 injection and then continued at home.   · 5/10/16 - WBC 5.1, hemoglobin 12.5, platelet count 201,000.   · 6/14/16 - Monthly Vitamin B12 injections  continued at home.   · 7/11/16 - WBC 5.48, hemoglobin 12.7, MCV 86.2, platelet count 200,000.   · 8/16/16 - Patient continued on monthly Vitamin B12 injections at home.   · 1/5/17 - WBC 2.6 with 38% neutrophils, 56% lymphocytes, 5% monocytes, hemoglobin 10.5, .3, platelet count 63,000. Patient continued on Vitamin B12 injections 1000 mcg IM monthly at home.   · 3/20/17 - Retic 0.41 (0.5-1.5), creatinine 1.0 (0.4-1.0). Iron 12 (), TIBC 270 (228-428), ferritin 259 (), haptoglobin 340 (), TSH 0.43 (0.34-5.6), folate 6.0 (5.9-24.8). Serum protein electrophoresis revealed decreased gammaglobulins. Serum EDU had no monoclonal gammopathy identified. Stool Hemoccult was negative.   · 6/8/17 - WBC 6.3, hemoglobin 8.3, MCV 92.4, platelet count 50,000. Procrit 40,000 units subq weekly added for chemotherapy-induced anemia. Patient continued on Vitamin B12 at 1000 mcg IM monthly at home.   · 7/5/17 - Iron 33 (), TIBC 328 (228-428), ferritin 211 (), TSH 2.46 (0.34-5.6).       · 7/31/17 - WBC 5.0, hemoglobin 11.2, MCV 89.7, platelet count 217,000. We will continue with the Procrit 40,000 units subq weekly for chemo-induced anemia when the hemoglobin falls below 10.0 and the patient is also to continue with the Vitamin B12 at 1000 mcg IM monthly at home. Creatinine 1.24 (0.5-0.99).    · 8/28/17 - WBC 9.6, hemoglobin 8.7, MCV 93.7, platelet count 133,000. Patient is to continue with the Procrit 40,000 units subq weekly and will receive that today. She is also to continue with the Vitamin B12 at 1000 mcg IM monthly at home.  · 10/16/17 - WBC 7.5, hemoglobin 9.6, MCV 98.4, platelet count 195,000. Patient is to continue with Procrit 40,000 units subq weekly and will receive Procrit today.   · 1/1/18 - Creatinine 1.3 (0.4-1.0).    · 2/19/18 - Procrit given for hemoglobin 9.9.   · 2/26/18 - Continue Procrit 40,000 units weekly p.r.n. hemoglobin <10. Continue Vitamin B12 at 1000 mcg IM monthly at  home.   · 3/26/18 - Hemoglobin 8.3. Procrit dose increased to 60,000 units weekly. Iron 29 (), TIBC 306 (228-428), and iron sat 9% (15-50). Iron 29 (), TIBC 306 (228-428), iron saturation 9% (15-50).   · 3/27/18 - Order written to start Ferrous sulfate 325 mg p.o. b.i.d.    · 4/2/18 - Stool heme negative x3.   · 4/30/18 - Patient had not started Ferrous sulfate 325 mg p.o. b.i.d. and verbalized understanding to do so and to notify the office if side effects are not tolerable.   · 5/24/18 - Patient was hospitalized at [facility] between 5/24/18 and 5/26/18 with dark tarry stool. INR was subtherapeutic. She was given IV Protonix and Protonix 40 mg daily. She underwent an EGD by David Rogers M.D. revealing small hiatal hernia, small submucosal nodule near the cardia, erosive gastritis. Pathology on gastric antrum and body biopsy revealed iron pill gastritis and no evidence of Helicobacter pylori. She then underwent a colonoscopy revealing diverticulosis, hemorrhoids and surgical changes from previous resection. It was recommended to consider outpatient M2A if hemoglobin continued to drop.   · 6/4/18 - Order written to discontinue iron pills and to use Injectafer 750 mg IV days 1 and 8 for low iron sats. Order written for Procrit 40,000 units subq weekly. Iron 65 (), TIBC 328 (228-428), iron saturation 20% (15-50), ferritin 123 ().   · 6/29/18 - Discussed patient’s intolerance of oral iron due to gastritis. Oral iron discontinued with plans for Injectafer should iron level drop in the future.   · 11/7/18 - Patient claims not to be taking Vitamin B12 injections at home. Asked to resume those.   · 12/3/18 - Patient reports she has not been taking her B12 injections for a couple of months. She needs some syringes and needles for that.   · 2/4/19 - Patient was uncertain if she is currently taking ferrous Sulfate or not. Patient with history of intolerance and will follow hemoglobin.   · 5/8/19 -  Ferritin 64 ().  · 8/27/2019- iron 52 (), iron saturation 14% (15-50), TIBC 364 (228-420), and ferritin 74 ().  Injectafer 750 mg IV day 1 and 8 due to oral iron intolerance ordered.  · 8/30/2019- Injectafer 750 mg IV day 1 given.  Hemoglobin 10.8.  At the end of the infusion heart rate was 48 and the patient reported mild dizziness.  Patient was sent to the ED for evaluation with symptoms resolving rapidly.  Chest x-ray and CT head were negative for acute findings.  · 9/6/2019-Injectafer 750 mg IV day 8 given without adverse effects.  Hemoglobin 9.8.   · 9/25/19 - Iron 85 (). Iron saturation 25 (15-50). TIBC 335 (228-428). Ferritin  694 ().  Hemoglobin 10.3.  · 10/25/2019 hemoglobin 9.7.  Patient started on Retacrit 40,000 units subcu weekly.  · 12/17/2020 hemoglobin 11.9, hematocrit 38.3     Past Medical History:   Diagnosis Date   • Anemia 2013   • Cervical disc disorder 2013    Herniated disc, pinched nerve   • Clotting disorder (CMS/HCC) 2013    Low platelets from chemo   • Colon polyp 2013   • Deep vein thrombosis (CMS/HCC) 2013   • Diverticulosis 2013   • GERD (gastroesophageal reflux disease) 2016   • HL (hearing loss) 2016    I need hearing aids   • Hyperlipidemia 2013    Need my cholesterol rechecked   • Hypertension    • Joint pain 2013    Shoulder pain, torn rotator cuff   • Low back pain 2013   • Neuromuscular disorder (CMS/HCC) 2015    Shingles, pinched nerves in my neck   • Osteopenia 2014   • Osteoporosis    • Ovarian cancer (CMS/HCC)     ovarian--  spread to lungs 10/2020   • Ovarian cancer on left (CMS/HCC) 1995   • Pneumonia 2010    Have had it several times   • Spinal stenosis 2013    Cervical   • Urinary tract infection 2013    Have had several utis       Past Surgical History:   Procedure Laterality Date   • BRONCHOSCOPY N/A 2/10/2021    Procedure: BRONCHOSCOPY with bronchioalveolar lavage;  Surgeon: Jerica Esparza MD;  Location: [facility] ENDOSCOPY;  Service: Pulmonary;   Laterality: N/A;  post op:right lobe pneumonia   • BRONCHOSCOPY N/A 6/15/2021    Procedure: BRONCHOSCOPY with bronchoalveolar lavage;  Surgeon: Jerica Esparza MD;  Location: [facility] ENDOSCOPY;  Service: Pulmonary;  Laterality: N/A;  lung cancer;pneumonia   • CATARACT EXTRACTION     • COLON SURGERY     • COLONOSCOPY  05/26/2018   • EXPLORATORY LAPAROTOMY     • HERNIA REPAIR     • HYSTERECTOMY     • OOPHORECTOMY           Current Outpatient Medications:   •  acetaminophen (TYLENOL) 500 MG tablet, Take 1,000 mg by mouth Every 6 (Six) Hours As Needed for Mild Pain ., Disp: , Rfl:   •  Calcium Carbonate 1500 (600 Ca) MG tablet, Take 600 mg by mouth 2 (Two) Times a Day., Disp: , Rfl:   •  calcium carbonate-vitamin d 600-400 MG-UNIT per tablet, Take 1 tablet by mouth 2 (Two) Times a Day., Disp: 60 tablet, Rfl: 3  •  cefdinir (OMNICEF) 300 MG capsule, Take 1 capsule by mouth 2 (Two) Times a Day., Disp: 5 capsule, Rfl: 0  •  cetirizine (zyrTEC) 10 MG tablet, Take 1 tablet by mouth Every Night., Disp: 30 tablet, Rfl: 0  •  Combivent Respimat  MCG/ACT inhaler, INHALE 1 PUFF FOUR TIMES DAILY AS NEEDED FOR SHORTNESS OF BREATH, Disp: , Rfl:   •  cyanocobalamin 1000 MCG/ML injection, Inject 1,000 mcg into the appropriate muscle as directed by prescriber Every 28 (Twenty-Eight) Days., Disp: , Rfl:   •  dexamethasone (DECADRON) 6 MG tablet, Take 1 tablet by mouth Daily With Breakfast., Disp: 5 tablet, Rfl: 0  •  famotidine (PEPCID) 40 MG tablet, Take 1 tablet by mouth Every Morning Before Breakfast., Disp: 90 tablet, Rfl: 1  •  folic acid (FOLVITE) 1 MG tablet, Take 1 tablet by mouth Daily., Disp: 30 tablet, Rfl: 1  •  HYDROcodone-homatropine (HYCODAN) 5-1.5 MG/5ML syrup, Take 5 mL by mouth Every 8 (Eight) Hours As Needed for Cough., Disp: 473 mL, Rfl: 0  •  MAGnesium-Oxide 400 (241.3 Mg) MG tablet tablet, Take 400 mg by mouth 2 (Two) Times a Day., Disp: , Rfl:   •  Misc. Devices (Commode Bedside) misc, 1 Device Daily., Disp: 1  each, Rfl: 0  •  Misc. Devices (Roller Walker) misc, 1 Device Daily., Disp: 1 each, Rfl: 0  •  montelukast (SINGULAIR) 10 MG tablet, TAKE 1 TABLET BY MOUTH EVERY NIGHT, Disp: 30 tablet, Rfl: 2  •  oxyCODONE (ROXICODONE) 20 MG tablet, Take 1 tablet by mouth Every 4 (Four) Hours As Needed for Moderate Pain ., Disp: 12 tablet, Rfl: 0  •  phosphorus (K PHOS NEUTRAL) 155-852-130 MG tablet, Take 1 tablet by mouth 3 (Three) Times a Day., Disp: 90 tablet, Rfl: 3  •  potassium chloride (K-DUR,KLOR-CON) 20 MEQ CR tablet, Take 60 mEq by mouth Daily., Disp: , Rfl:   •  potassium chloride (K-DUR,KLOR-CON) 20 MEQ CR tablet, Take 40 mEq by mouth Every Night., Disp: , Rfl:   •  pregabalin (LYRICA) 100 MG capsule, Take 1 capsule by mouth 3 (Three) Times a Day., Disp: 15 capsule, Rfl: 0  •  sertraline (ZOLOFT) 50 MG tablet, Take 50 mg by mouth Every Night., Disp: , Rfl:   •  temazepam (RESTORIL) 30 MG capsule, Take 1 capsule by mouth At Night As Needed for Sleep., Disp: 30 capsule, Rfl: 1  •  triamterene-hydrochlorothiazide (DYAZIDE) 37.5-25 MG per capsule, TAKE 1 CAPSULE BY MOUTH EVERY DAY, Disp: 90 capsule, Rfl: 1  •  warfarin (COUMADIN) 2 MG tablet, DVT  Indications: Atrial Fibrillation, Disp: 30 tablet, Rfl: 0  No current facility-administered medications for this visit.    Facility-Administered Medications Ordered in Other Visits:   •  heparin injection 500 Units, 500 Units, Intravenous, PRN, Cindy Ball MD  •  sodium chloride 0.9 % flush 10 mL, 10 mL, Intravenous, PRN, Cindy Ball MD, 20 mL at 09/16/21 1045    Allergies   Allergen Reactions   • Morphine Nausea Only and Hives   • Penicillin V Potassium Rash   • Sulfa Antibiotics Rash   • Levaquin [Levofloxacin] Nausea Only and Dizziness     When taken with Coumadin   • Amoxicillin Rash       Family History   Problem Relation Age of Onset   • Hypertension Mother    • Cancer Father    • Hypertension Father    • Hypertension Sister    • Hypertension Brother   "  • Stroke Brother        Cancer-related family history includes Cancer in her father.    Social History     Tobacco Use   • Smoking status: Never Smoker   • Smokeless tobacco: Never Used   Vaping Use   • Vaping Use: Never used   Substance Use Topics   • Alcohol use: No     Comment: caffeine 32-64oz qd   • Drug use: No     HPI, ROS and PFSH have been reviewed and confirmed on 9/16/2021.     SUBJECTIVE:    Patient is here today for follow-up.  Her cough has significantly improved on Hycodan.  She has chronic shortness of breath.  She feels weak.  Patient was discharged to rehabilitation center following respiratory failure for COVID-19 infection.  She is accompanied today by her daughter for this appointment    REVIEW OF SYSTEMS:    Review of Systems   Constitutional: Negative for chills and fever.   HENT: Negative for ear pain, mouth sores, nosebleeds and sore throat.    Eyes: Negative for photophobia and visual disturbance.   Respiratory: Negative for wheezing and stridor.    Cardiovascular: Negative for chest pain and palpitations.   Gastrointestinal: Negative for abdominal pain, diarrhea, nausea and vomiting.   Endocrine: Negative for cold intolerance and heat intolerance.   Genitourinary: Negative for dysuria and hematuria.   Musculoskeletal: Negative for joint swelling and neck stiffness.   Skin: Negative for color change and rash.   Neurological: Negative for seizures and syncope.   Hematological: Negative for adenopathy.        No obvious bleeding   Psychiatric/Behavioral: Negative for agitation, confusion and hallucinations.     OBJECTIVE:  Vitals:    09/16/21 1055   BP: 145/88   Pulse: 85   Resp: 20   Temp: 97.3 °F (36.3 °C)   TempSrc: Infrared   Weight: 71.7 kg (158 lb)   Height: 165.1 cm (65\")   PainSc: 0-No pain     Temp 97.5   Pulse 64  RR 18  /92  Height 165.1 cm   Weight 81.6 kg   BSA 1.89  BMI 30    ECOG  (1) Restricted in physically strenuous activity, ambulatory and able to do work of light " nature    Physical Exam   Constitutional: She is oriented to person, place, and time. No distress.   Obese    HENT:   Head: Normocephalic and atraumatic.   Mouth/Throat: Mucous membranes are moist.   Eyes: Conjunctivae are normal. Right eye exhibits no discharge. Left eye exhibits no discharge. No scleral icterus.   Neck: No thyromegaly present.   Cardiovascular: Normal rate, regular rhythm and normal heart sounds. Exam reveals no gallop and no friction rub.   Pulmonary/Chest: Effort normal. No stridor. No respiratory distress. She has no wheezes.   Left chest wall port    Abdominal: Soft. Bowel sounds are normal. She exhibits no mass. There is no abdominal tenderness. There is no rebound and no guarding.   Musculoskeletal: Normal range of motion. No tenderness.   Lymphadenopathy:     She has no cervical adenopathy.   Neurological: She is alert and oriented to person, place, and time. She exhibits normal muscle tone.   Skin: Skin is warm and dry. She is not diaphoretic. No erythema.   Psychiatric: Her behavior is normal. Mood normal.   Nursing note and vitals reviewed.    I have reexamined the patient and the results are consistent with the previously documented exam. Cindy Ball MD     RECENT LABS    WBC   Date Value Ref Range Status   09/16/2021 6.30 3.40 - 10.80 10*3/mm3 Final   07/05/2020 4.04 (L) 4.5 - 11.0 10*3/uL Final     RBC   Date Value Ref Range Status   09/16/2021 3.50 (L) 3.77 - 5.28 10*6/mm3 Final   07/05/2020 3.68 (L) 4.0 - 5.2 10*6/uL Final     Hemoglobin   Date Value Ref Range Status   09/16/2021 9.9 (L) 12.0 - 15.9 g/dL Final   07/05/2020 11.5 (L) 12.0 - 16.0 g/dL Final     Hematocrit   Date Value Ref Range Status   09/16/2021 32.7 (L) 34.0 - 46.6 % Final   07/05/2020 35.3 (L) 36.0 - 46.0 % Final     MCV   Date Value Ref Range Status   09/16/2021 93.4 79.0 - 97.0 fL Final   07/05/2020 95.9 80.0 - 100.0 fL Final     MCH   Date Value Ref Range Status   09/16/2021 28.3 26.6 - 33.0 pg Final    07/05/2020 31.3 26.0 - 34.0 pg Final     MCHC   Date Value Ref Range Status   09/16/2021 30.3 (L) 31.5 - 35.7 g/dL Final   07/05/2020 32.6 31.0 - 37.0 g/dL Final     RDW   Date Value Ref Range Status   09/16/2021 21.9 (H) 12.3 - 15.4 % Final   07/05/2020 15.9 12.0 - 16.8 % Final     RDW-SD   Date Value Ref Range Status   09/16/2021 72.3 (H) 37.0 - 54.0 fl Final     MPV   Date Value Ref Range Status   09/16/2021 12.2 (H) 6.0 - 12.0 fL Final   07/05/2020 10.2 6.7 - 10.8 fL Final     Platelets   Date Value Ref Range Status   09/16/2021 165 140 - 450 10*3/mm3 Final   07/05/2020 158 140 - 440 10*3/uL Final     Neutrophil Rel %   Date Value Ref Range Status   07/05/2020 54.0 45 - 80 % Final     Neutrophil %   Date Value Ref Range Status   09/16/2021 66.4 42.7 - 76.0 % Final     Lymphocyte Rel %   Date Value Ref Range Status   07/05/2020 30.4 15 - 50 % Final     Lymphocyte %   Date Value Ref Range Status   09/16/2021 18.7 (L) 19.6 - 45.3 % Final     Monocyte Rel %   Date Value Ref Range Status   07/05/2020 12.4 0 - 15 % Final     Monocyte %   Date Value Ref Range Status   09/16/2021 12.2 (H) 5.0 - 12.0 % Final     Eosinophil %   Date Value Ref Range Status   09/16/2021 2.2 0.3 - 6.2 % Final   07/05/2020 3.0 0 - 7 % Final     Basophil Rel %   Date Value Ref Range Status   07/05/2020 0.2 0 - 2 % Final     Basophil %   Date Value Ref Range Status   09/16/2021 0.5 0.0 - 1.5 % Final     Immature Grans %   Date Value Ref Range Status   07/05/2020 0.0 0 % Final     Neutrophils Absolute   Date Value Ref Range Status   07/05/2020 2.18 2.0 - 8.8 10*3/uL Final     Neutrophils, Absolute   Date Value Ref Range Status   09/16/2021 4.18 1.70 - 7.00 10*3/mm3 Final     Lymphocytes Absolute   Date Value Ref Range Status   07/05/2020 1.23 0.7 - 5.5 10*3/uL Final     Lymphocytes, Absolute   Date Value Ref Range Status   09/16/2021 1.18 0.70 - 3.10 10*3/mm3 Final     Monocytes Absolute   Date Value Ref Range Status   07/05/2020 0.50 0.0 -  1.7 10*3/uL Final     Monocytes, Absolute   Date Value Ref Range Status   09/16/2021 0.77 0.10 - 0.90 10*3/mm3 Final     Eosinophils Absolute   Date Value Ref Range Status   07/05/2020 0.12 0.0 - 0.8 10*3/uL Final     Eosinophils, Absolute   Date Value Ref Range Status   09/16/2021 0.14 0.00 - 0.40 10*3/mm3 Final     Basophils Absolute   Date Value Ref Range Status   07/05/2020 0.01 0.0 - 0.2 10*3/uL Final     Basophils, Absolute   Date Value Ref Range Status   09/16/2021 0.03 0.00 - 0.20 10*3/mm3 Final     Immature Grans, Absolute   Date Value Ref Range Status   07/05/2020 0.00 <1 10*3/uL Final     nRBC   Date Value Ref Range Status   08/30/2021 2.0 (H) 0.0 - 0.2 /100 WBC Final   08/29/2021 0.3 (H) 0.0 - 0.2 /100 WBC Final       Lab Results   Component Value Date    GLUCOSE 78 08/30/2021    BUN 34 (H) 08/30/2021    CREATININE 0.71 08/30/2021    EGFRIFNONA 82 08/30/2021    EGFRIFAFRI >60 06/07/2019    BCR 47.9 (H) 08/30/2021    K 3.8 08/30/2021    CO2 23.0 08/30/2021    CALCIUM 6.0 (L) 08/30/2021    ALBUMIN 2.60 (L) 08/30/2021    LABIL2 1.3 07/03/2020    AST 23 08/30/2021    ALT 11 08/30/2021     ASSESSMENT:    1. 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  She was on carboplatinum, Doxil.  Patient had had carboplatinum Taxol, carbo Taxotere, Gemzar single agent, niraparib and was on Doxil and carboplatin.  Doxil was discontinued due to approaching of lifetime dosing.  Progressed on single agent topotecan ,Alimta and combination chemotherapy with Gemzar plus cis-platinum.  Patient also progressed on oral etoposide.  Refer to paradigm testing for future options at time of progression.  Patient has had progression of disease and therefore platinum was discontinued. On exam today I felt a right lower abdominal mass which is new.  She has CT scan of the chest, abdomen and pelvis on 3/1/2021 this showed moderate size pericardial effusion, scattered pulmonary nodules  with septal thickening worse in the right lung compared to the left.  Pathologically enlarged hilar and mediastinal lymph nodes were suspicious for metastatic disease.  CT of the abdomen and pelvis showed increase in size of multiple soft tissue masses involving the anterior abdominal wall likely related to worsening of metastatic disease.  Increase in size of multiple sclerotic lesions within the lumbar spine and pelvis.  A 2D echocardiogram which did not reveal any significant pericardial effusion on 3/4/2021.  Recent CT scan of the chest suggested patient may have lymphangitic spread of malignancy.  Patient developed more pulmonary symptoms while on single agent oral etoposide.  Therefore this chemotherapy has been discontinued.  She was on single agent vinorelbine but now wants to hold off on chemo due to her recent infection with COVID-19 and hospitalization.  Patient is open to trying endocrine therapy: Letrozole has been recommended.  This is her preference at this time.  2. Reviewed the literature and other potential treatment options for her will include single agent vinorelbine, Xeloda, Cytoxan, oxaliplatin and aromatase inhibitors including letrozole and Faslodex.  If she has a high tumor mutational burden or PD-L1 expression, immunotherapy could also be an option. Also will proceed with biopsying of the anterior abdominal mass to send tissue for foundation 1 testing.  Reviewed results of foundation 1 testing, no actionable mutations.  PD-L1 is still pending  3. Patient has a subcutaneous nodule noted on the anterior abdominal wall consistent with metastatic disease currently measures 3.5 cm.  The anterior abdominal wall nodule has not significantly changed.  We will continue to monitor as the index cutaneous lesion.  4. Patient has bone metastases therefore I recommended Xgeva 120 mg subcu monthly.  Reviewed the side effects, benefits in detail with patient.  She would also be initiated on Os-Johnny D 600  mg twice a day.  She will continue Xgeva  5. Progressive brain metastasis: Status post stereotactic brain radiation under the care of Dr. Delong and recent MRI of the brain on 12/15/2020 shows probable progression.  Recent brain MRI did not show any obvious signs of progression.  6. B12 deficiency on parenteral B12 injections  7. History of DVT on anticoagulation therapy with warfarin:   8. Pancytopenia: Due to chemotherapy: On Procrit  9. Hypomagnesemia: Continue to monitor levels and infuse as needed  10. Iron deficiency anemia: Monitoring  11. Post hospital visit  12. Assessment has been reviewed and updated as documented above    Discussion:    Begin letrozole 2.5 mg daily reviewed.  Reviewed side effects    Side effects of aromatase inhibitors include risk of musculoskeletal side effect including arthralgia, myalgia, especially in patients who have underlying musculoskeletal disease such as osteoarthritis, hot flashes, mood changes. There is risk of vaginal dryness, dyspareunia and other sexual dysfunction. Other side effects include degree of cognitive symptoms compared to women not on endocrine therapy. There is possibility of fatigue, forgetfulness, poor sleep hygiene, osteoporosis, osteopenia, risk of fractures, cardiovascular disease and hypercholestolemia.         PLANS:    1. Foundation 1 testing reviewed.  PD-L1 is still pending  2. Discontinue vinorelbine  3. Begin letrozole 2.5 mg p.o. daily  4. Patient was given information on letrozole  5. Continue calcium with vitamin D  6. CT scan of the chest, abdomen and pelvis, CMP and CA-125 today  7. Repeat INR tomorrow 9/17/2021. Continue to hold warfarin  8. Increase phosphorus supplements to 3 times a day  9. Follow-up with me in 3 weeks  10. Continue supportive care  11. Continue Xgeva 120 mg subcu monthly  12. Continue folic acid 1 mg p.o. daily -reviewed needs to stay on drug to prevent side effects from Alimta   13. Continue B12 injections 1000 mcg IM  monthly, next due 1/14/2021  14. Status post IV Injectafer  15. Continue magnesium oxide 400 mg three times a day and weekly IV replacement as needed.   16. Continue Retacrit 40,000 units subcu weekly for hemoglobin less than 10, for chemotherapy-induced anemia  17. Continue supportive medications  18. MRI brain reviewed  19. All questions answered     I have reviewed labs results, imaging, vitals, and medications with the patient today.   Lab Results   Component Value Date    WBC 6.30 09/16/2021    HGB 9.9 (L) 09/16/2021    HCT 32.7 (L) 09/16/2021    MCV 93.4 09/16/2021     09/16/2021     Patient verbalized understanding and is in agreement of the above plans.        I spent 40 total minutes, face-to-face, caring for Tahira today.  90% of this time involved counseling and/or coordination of care as documented within this note.

## 2021-09-16 NOTE — OUTREACH NOTE
Ambulatory Case Management Note    General & Health Literacy Assessment    Questions/Answers      Most Recent Value   Assessment Completed With  Children [daughter Pati]   Living Arrangement  Children [daughter Pati]   Type of Residence  Private Residence   Home Care Services  Yes [[facility]]   Equiptment Used at Home  Cane [waiting on delivery of walker and bedside commode]   Communication Device  Yes   Bed or Wheelchair Confined  No   Difficulty Keeping Appointments  No        Care Evaluation    Questions/Answers      Most Recent Value   Other Patient Education/Resources   24/7 [facility] Nurse Call Line   24/7 Nurse Call Line Education Method  Verbal   Medication Adherence  Medications understood        SDOH updated and reviewed with the patient during this program:     Financial Resource Strain: Low Risk    • Difficulty of Paying Living Expenses: Not hard at all       Food Insecurity: No Food Insecurity   • Worried About Running Out of Food in the Last Year: Never true   • Ran Out of Food in the Last Year: Never true       Transportation Needs: No Transportation Needs   • Lack of Transportation (Medical): No   • Lack of Transportation (Non-Medical): No     Patient Outreach    Pt discharged from [facility] to home on 9/9/21. RN-ACM outreach call made to pt, spoke with pt's daughter Pati. Explained role of RN-ACM. Pati reports pt is current with [facility]. Pati states they are expecting delivery from [employer] tomorrow or Monday with a walker and bedside commode for pt. Pt has been using a cane while waiting for walker. Pati states she is a nurse and has been helping pt with ambulation, bathing, other needs. Pati denies medication questions, states she is managing pt's medications for her. Reviewed SDOH. Pati denies any pt needs at this time. Pt has PCP follow up appt scheduled for 9/22/21. No questions or concerns per Pati. RN-ACM contact information and [place] [phone]  hour nurse line number provided. Pati appreciates the phone call and declines need for further calls. Advised Pati to call with any needs. Follow up outreach as needed.     Vanesa Carlson RN  Ambulatory Case Management    9/16/2021, 14:27 EDT

## 2021-09-17 NOTE — TELEPHONE ENCOUNTER
Patient already increased magnesium from 2 tablets daily to 3 today per IRENA Alonso. Will come in Monday for recheck.

## 2021-09-17 NOTE — TELEPHONE ENCOUNTER
Called patient and spoke with her daughter. I let her know that FARHAN Alonso would like the patient to have her mag rechecked Monday and possibly infuse when she is here for her INR recheck. I let her know I put the patient on for Monday at 1300. Also, that patient should increase her mag to TID. Pati verbalized understanding. Labs entered.

## 2021-09-17 NOTE — PROGRESS NOTES
Patient came in for INR. INR 3.8, patient to resume coumadin at 5mg daily, recheck Monday. Mag yesterday was 1.4, spoke with FARHAN Alonso and let her know patient could not stay today. Orders to recheck Monday with INR and possibly give mag if still low. Also, increase oral mag to TID instead of BID.Patient made aware and verbalized understanding.

## 2021-09-20 NOTE — PROGRESS NOTES
Pt here for labs and possible mag infusion.  Labs drawn per Gladis Pearson RN and sent to lab for processing.  Pt is wanting to come back tomorrow due to having to wait on lab to be taken to the hospital for processing.  Pt rescheduled for tomorrow at 0930. I instructed pt that I will call her if she does not need to come in.  She v/u.  Port flushed w/ normal saline and heparin and needle removed.  Pt d/c'd from clinic.

## 2021-09-21 NOTE — TELEPHONE ENCOUNTER
Rx Refill Note  Requested Prescriptions     Pending Prescriptions Disp Refills   • HYDROcodone-homatropine (HYCODAN) 5-1.5 MG/5ML syrup 473 mL 0     Sig: Take 5 mL by mouth Every 8 (Eight) Hours As Needed for Cough.      Last office visit with prescribing clinician: 9/16/2021      Next office visit with prescribing clinician: 10/7/2021            Marissa Ledesma MA  09/21/21, 14:37 EDT

## 2021-09-22 NOTE — TELEPHONE ENCOUNTER
PT CALLED BACK INQUIRING ABOUT COUGH SYRUP MEDICATION. PLEASE SEND PRESCRIPTION TO PHARMACY FOR REFILL

## 2021-09-22 NOTE — PROGRESS NOTES
Chief Complaint  Cough (Recent COVID pneumonia)  You have chosen to receive care through a telehealth visit.  Do you consent to use a video/audio connection for your medical care today? Yes    Patient presents during the COVID-19 pandemic/federally declared state of public health emergency.  This service was conducted via [employer] real-time audio/visual.    Subjective          Tahira Zimmerman presents to [facility]  Tahira Zimmerman is a 66 y.o. female  who presented to the ER on 8/25/21 with altered mental status and hypocoagulable state with an INR greater than 8.  She was diagnosed Covid positive at [facility] on 8/17/2021 and given a Z-Victoriano.  Patient had become increasingly weak and confused with low O2 sats.  She did not get a Covid vaccine given her immunocompromised state from metastatic ovarian cancer and chemotherapy per the recommendation of her oncologist.  The rest of her family members also did not get vaccinated for Covid to protect the patient from infection.  They have all contracted Covid and given it to the patient.  She was given vitamin K 10 mg IV in the ED.  Patient was started on Lovenox, dex, and remdesivir due to high risk.  She completed a 5 day course of Remdesivir.  She was bridged with lovenox until therapeutic INR with Coumadin.  [place] was placed due to retention, but removed at discharge.    Since discharge from the hospital she has continued to have cough, shortness of breath and fatigue but is gradually getting a little stronger.  Her daughter reports that her oxygen saturation was 95% on RA.  She has decided to go to palliative care only for her ovarian cancer.  She is no longer getting chemotherapy.  She has not started hospice care but is considering it.       Cough        Objective   Vital Signs:   There were no vitals taken for this visit.    Physical Exam  Constitutional:       Appearance: She is ill-appearing.   Pulmonary:       Effort: Pulmonary effort is normal.   Neurological:      Mental Status: She is alert.   Psychiatric:         Mood and Affect: Mood normal.        Result Review :{Labs  Result Review  Imaging  Med Tab  Media  Procedures :23}                 Assessment and Plan    Diagnoses and all orders for this visit:    1. Pneumonia of right lower lobe due to infectious organism (Primary)  Assessment & Plan:  Recent COVID pneumonia      2. Restless legs syndrome  -     temazepam (RESTORIL) 30 MG capsule; Take 1 capsule by mouth At Night As Needed for Sleep.  Dispense: 30 capsule; Refill: 1    3. Malignant neoplasm of right ovary (CMS/HCC)    4. Essential hypertension  -     triamterene-hydrochlorothiazide (DYAZIDE) 37.5-25 MG per capsule; Take 1 capsule by mouth Daily.  Dispense: 90 capsule; Refill: 1    Other orders  -     famotidine (PEPCID) 40 MG tablet; Take 1 tablet by mouth Every Morning Before Breakfast.  Dispense: 90 tablet; Refill: 1      Follow Up   No follow-ups on file.  Patient was given instructions and counseling regarding her condition or for health maintenance advice. Please see specific information pulled into the AVS if appropriate.

## 2021-09-23 NOTE — TELEPHONE ENCOUNTER
Hub is instructed to read the documentation below to patient    ATTEMPTED TO CONTACT PATIENT'S DAUGHTER, DANIELLE, REGARDING INFUSION APPTS.  NO ANSWER.  LMV ASKING HER TO CALL BACK.

## 2021-09-24 NOTE — TELEPHONE ENCOUNTER
----- Message from IRENA Gomez sent at 9/22/2021  2:54 PM EDT -----  Regarding: Recheck CMP  Hi elinor,    Patient has Potassium of 3.3, Mag of 1.5, and Calcium of 8.0    She is taking potssoium 60 daily, Mag TID and Calcium daily    Will you call and tell her I want to recheck these on Monday.      I will put in order for CMP    Thank you,    Celeste REAL  ----- Message -----  From: Lab, Background User  Sent: 9/20/2021   3:12 PM EDT  To: IRENA Gomez

## 2021-09-24 NOTE — TELEPHONE ENCOUNTER
Attempted to call pt to let her know that her labs were abnormal and to schedule CMP. No answer or identifying VM. Callback number left.

## 2021-09-27 NOTE — TELEPHONE ENCOUNTER
From: Tahira Zimmerman  Sent: 9/27/2021 3:03 PM EDT  To: [facility] [place]  Subject: RE: Prescription Question    And I’m at the end of life on palliative cAre due to cancer that is now from my brain to my pelvic area, in my bones, lungs and every where else, and I’ve spoken to dr iglesias myself many times and I don’t believe he would just give me 12. Can you please ask him to call me.

## 2021-09-27 NOTE — TELEPHONE ENCOUNTER
From: Tahira Zimmerman  To: Fito Ram MD  Sent: 9/24/2021 10:50 PM EDT  Subject: Prescription Question    I need a refill on my oxycodone 20 mg. I’ll be out on Monday. I’ve decided to stop chemo. I’ve been on it for 7 years and it hasn’t worked. I just want to feel as good as I can for the time I have left.

## 2021-09-29 NOTE — TELEPHONE ENCOUNTER
Hub to read: Left message for pt to have INR appt scheduled and to confirm whether or not she is with palliative care company. If this is the case, than the INR may be able to be performed by them.  Ask her to return my call.

## 2021-10-05 NOTE — PROGRESS NOTES
"Subjective   Tahira Zimmerman is a 66 y.o. female.     neck and shoulder pain, all over aching \"bone\" due to chemo--also left rotator cuff tear also broke out with shingles-under breast on back and on head. 9/10 at worst, 2/10 at best. Nonradiating. Always present, varies, interferes with daily activities. Imaging shows metastatic disease. Lengthy prior notes reviwed, treated for metastatic disease, failed Tramadol, cannot tolerate Hydrocodone, could tolerate Oxycodone 5mg, taking BID, also Lyrica 75mg BID. Started new chemo, worsening pain in b/l hips and legs, especially at night. Now taking Oxycodone 10mg TID prn and Lyrica 100mg BID with good relief. Started another new chemo with worsening pain, on a break while insurance approves pill form with improved pain. New chemo pill lowered Mg, holding it while getting Mg infusions. Shingles outbreak. Started another new chemo, has aches controlled by current meds. Had fall, 2 \"somethings\" found on her brain on imaging. Holding chemo with PNA, on ABX.       The following portions of the patient's history were reviewed and updated as appropriate: allergies, current medications, past family history, past medical history, past social history, past surgical history and problem list.    Review of Systems   Constitutional: Negative for chills, fatigue and fever.   HENT: Positive for hearing loss. Negative for trouble swallowing.    Eyes: Negative for visual disturbance.   Respiratory: Negative for shortness of breath.    Cardiovascular: Negative for chest pain.   Gastrointestinal: Positive for abdominal pain and nausea. Negative for constipation, diarrhea and vomiting.   Genitourinary: Negative for urinary incontinence.   Musculoskeletal: Positive for arthralgias, back pain and neck pain. Negative for joint swelling and myalgias.   Neurological: Positive for headache. Negative for dizziness, weakness and numbness.       Objective   Physical Exam   Constitutional: She is " oriented to person, place, and time. She appears well-developed and well-nourished.   HENT:   Head: Normocephalic and atraumatic.   Eyes: Pupils are equal, round, and reactive to light.   Cardiovascular: Normal rate, regular rhythm and normal heart sounds.   Pulmonary/Chest: Breath sounds normal.   Abdominal: Soft. Bowel sounds are normal. She exhibits no distension. There is no abdominal tenderness.   Neurological: She is alert and oriented to person, place, and time. She has normal reflexes. She displays normal reflexes. A sensory deficit is present.   Decreased in b/l stocking distribution   Psychiatric: Her behavior is normal. Thought content normal.         Assessment/Plan   Diagnoses and all orders for this visit:    1. Pain of metastatic malignancy (Primary)    2. Cervical disc disorder with radiculopathy    3. Chronic nonintractable headache, unspecified headache type    4. Fibromyositis    5. Leg pain, bilateral    6. Neck pain    7. Osteoarthritis of cervical spine without myelopathy    8. Other long term (current) drug therapy    9. Restless legs syndrome        INspect reviewed, in orderLow risk. Repeat drug screen 1/12/21 in order.  Increased to Oxycodone 10mg 1/2 - 1 tab q4h prn, increased to Oxycodone 20mg q4h prn, cannot tolerate Hydrocodone, failed Tramadol. May need to increase in future but currently adequate.  Increased to Lyrica 100mg TID for worsening pain. More effective than Gabapentin, already taking antidepressants so no plans for beginning Cymbalta.  Cont RxAlt shingles cream prn.  Cont other meds as prescribed.  No plans for procedures or TENS with metastatic disease.  RTC 3 months for f/u.

## 2021-10-05 NOTE — TELEPHONE ENCOUNTER
Called pt's daughter to follow-up. I told her that we have tried calling the pt several times and have heard she is Hospice so we wanted to verify that. She said that she is not Hospice yet and that they were dealing with a death in the family recently, but the pt has gotten her scans done and has follow-up appointments scheduled. I asked if she is taking calcium or potassium supplements because her calcium was low and her potassium was elevated. She stated that she is taking both calcium and potassium. I told her I would discuss this with Dr. Ball and let them know her recommendations. She verbalized understanding.

## 2021-10-07 NOTE — PROGRESS NOTES
FOLLOW-UP NOTE    Name: Tahira Zimmerman  YOB: 1955  MRN #: [id number]  Date of Service: 10/7/2021  Referring Provider: Cindy Ball MD  Primary Care Provider: Noemy Queen MD    DIAGNOSIS: stage IV Ovarian cancer  1. Bone metastases (HCC)    2. Brain metastases (HCC)      REASON FOR VISIT: Brain mets, painful clival/C2-3 mets     RADIATION TREATMENT COURSE:27 Gy in 3 fractions on 10/28/2020 for an enlarging left parietal cortex lesion ([id number]) and a left superior cerebellar lesion ([id number] mm).      HISTORY OF PRESENT ILLNESS: The patient is a 66 y.o. year old female who was last seen on 07/07/2021 for consideration of systemic therapy, hospice, palliative whole brain radiation therapy, SRS, observation +/- palliative neck radiation therapy--she elected to start systemic therapy given multiple areas of symptoms. Navelbine was started on 07/09/2021.    Issues with tolerance of Navelbine and COVID over the interval (hospitalized on 08/21/2021 due to COVID, only had 2 cycles of Navelbine); note from 09/15/2021 with Dr. Ball reviewed.    Foundation 1 testing had showed no actionable mutation.    Now on Letrozole.    MRI brain 10/5/2021 reviewed with patient today:  The two ring enhancing lesions s/p prior SBRT have both decreased in size over the interval compared to 7/6/2021 scan and CT head 8/25/2021.  A 7 mm lesion near the vertex in the high left parietal lobe, increasing in size is noted.  3 new, small 3 mm foci of enhancement are noted: right parietal lobe, posterior horn of the right ventricle, posterior right temporal lobe.  Additionally, I was called because of osseous metastases--multiple lesions of the calvarium, skull base and visualized cervical spine. Dr. Douglas was concerned about abnormal signal within the posterior superior aspect of the clivus with marked cortical thickening that was at risk of pathologic fracture.  Also noted is an abnormal lesion in the odontoid at the  "level of C3.    Clinically, she is having pretty severe shortness of breath with the minimalist of activities.  She also has a cough--stable without hemoptysis.  She has dermal mets on her chest and diffusely.    Appt today with Dr. Ball.    Dr. Ram--pain management.    She reports generalized pain and notes 5/10 in her \"back of head/neck\".  Appt with optometrist tomorrow. Denies double vision or new issues.  + headaches.      The following portions of the patient's history were reviewed and updated as appropriate: allergies, current medications, past family history, past medical history, past social history, past surgical history and problem list. Reviewed with the patient and remain unchanged.    PAST MEDICAL HISTORY:  she  has a past medical history of Anemia (2013), Cervical disc disorder (2013), Clotting disorder (HCC) (2013), Colon polyp (2013), Deep vein thrombosis (HCC) (2013), Diverticulosis (2013), GERD (gastroesophageal reflux disease) (2016), HL (hearing loss) (2016), Hyperlipidemia (2013), Hypertension, Joint pain (2013), Low back pain (2013), Neuromuscular disorder (HCC) (2015), Osteopenia (2014), Osteoporosis, Ovarian cancer (HCC), Ovarian cancer on left (HCC) (1995), Pneumonia (2010), Spinal stenosis (2013), and Urinary tract infection (2013).  MEDICATIONS:   Current Outpatient Medications:   •  acetaminophen (TYLENOL) 500 MG tablet, Take 1,000 mg by mouth Every 6 (Six) Hours As Needed for Mild Pain ., Disp: , Rfl:   •  Calcium Carbonate 1500 (600 Ca) MG tablet, Take 600 mg by mouth 2 (Two) Times a Day., Disp: , Rfl:   •  calcium carbonate-vitamin d 600-400 MG-UNIT per tablet, Take 1 tablet by mouth 2 (Two) Times a Day., Disp: 60 tablet, Rfl: 3  •  cefdinir (OMNICEF) 300 MG capsule, Take 1 capsule by mouth 2 (Two) Times a Day., Disp: 5 capsule, Rfl: 0  •  cetirizine (zyrTEC) 10 MG tablet, Take 1 tablet by mouth Every Night., Disp: 30 tablet, Rfl: 0  •  Combivent Respimat  MCG/ACT inhaler, " INHALE 1 PUFF FOUR TIMES DAILY AS NEEDED FOR SHORTNESS OF BREATH, Disp: , Rfl:   •  cyanocobalamin 1000 MCG/ML injection, Inject 1,000 mcg into the appropriate muscle as directed by prescriber Every 28 (Twenty-Eight) Days., Disp: , Rfl:   •  dexamethasone (DECADRON) 6 MG tablet, Take 1 tablet by mouth Daily With Breakfast., Disp: 5 tablet, Rfl: 0  •  famotidine (PEPCID) 40 MG tablet, Take 1 tablet by mouth Every Morning Before Breakfast., Disp: 90 tablet, Rfl: 1  •  folic acid (FOLVITE) 1 MG tablet, Take 1 tablet by mouth Daily., Disp: 30 tablet, Rfl: 1  •  HYDROcodone-homatropine (HYCODAN) 5-1.5 MG/5ML syrup, Take 5 mL by mouth Every 8 (Eight) Hours As Needed for Cough., Disp: 120 mL, Rfl: 0  •  letrozole (FEMARA) 2.5 MG tablet, Take 1 tablet by mouth Daily., Disp: 30 tablet, Rfl: 6  •  MAGnesium-Oxide 400 (241.3 Mg) MG tablet tablet, Take 400 mg by mouth 3 (Three) Times a Day., Disp: , Rfl:   •  Misc. Devices (Commode Bedside) misc, 1 Device Daily., Disp: 1 each, Rfl: 0  •  Misc. Devices (Roller Walker) misc, 1 Device Daily., Disp: 1 each, Rfl: 0  •  montelukast (SINGULAIR) 10 MG tablet, TAKE 1 TABLET BY MOUTH EVERY NIGHT, Disp: 30 tablet, Rfl: 2  •  oxyCODONE (ROXICODONE) 20 MG tablet, Take 1 tablet by mouth Every 4 (Four) Hours As Needed for Moderate Pain ., Disp: 180 tablet, Rfl: 0  •  oxyCODONE (ROXICODONE) 20 MG tablet, Take 1 tablet by mouth Every 4 (Four) Hours As Needed for Moderate Pain ., Disp: 180 tablet, Rfl: 0  •  phosphorus (K PHOS NEUTRAL) 155-852-130 MG tablet, Take 1 tablet by mouth 3 (Three) Times a Day., Disp: 90 tablet, Rfl: 3  •  potassium chloride (K-DUR,KLOR-CON) 20 MEQ CR tablet, Take 60 mEq by mouth Daily., Disp: , Rfl:   •  potassium chloride (K-DUR,KLOR-CON) 20 MEQ CR tablet, Take 40 mEq by mouth Every Night., Disp: , Rfl:   •  pregabalin (LYRICA) 100 MG capsule, Take 1 capsule by mouth 3 (Three) Times a Day., Disp: 15 capsule, Rfl: 0  •  sertraline (ZOLOFT) 50 MG tablet, Take 50 mg by  "mouth Every Night., Disp: , Rfl:   •  temazepam (RESTORIL) 30 MG capsule, Take 1 capsule by mouth At Night As Needed for Sleep., Disp: 30 capsule, Rfl: 1  •  triamterene-hydrochlorothiazide (DYAZIDE) 37.5-25 MG per capsule, Take 1 capsule by mouth Daily., Disp: 90 capsule, Rfl: 1  •  warfarin (COUMADIN) 2 MG tablet, DVT  Indications: Atrial Fibrillation (Patient taking differently: 5 mg. DVT  Indications: Atrial Fibrillation), Disp: 30 tablet, Rfl: 0  ALLERGIES:   Allergies   Allergen Reactions   • Morphine Nausea Only and Hives   • Penicillin V Potassium Rash   • Sulfa Antibiotics Rash   • Levaquin [Levofloxacin] Nausea Only and Dizziness     When taken with Coumadin   • Amoxicillin Rash     PAST SURGICAL HISTORY: she has a past surgical history that includes Hysterectomy; Hernia repair; Colon surgery; Colonoscopy (05/26/2018); Exploratory laparotomy; Oophorectomy; Cataract extraction; Bronchoscopy (N/A, 2/10/2021); and Bronchoscopy (N/A, 6/15/2021).  PREVIOUS RADIOTHERAPY OR CHEMOTHERAPY: yes  FAMILY HISTORY: her family history includes Cancer in her father; Hypertension in her brother, father, mother, and sister; Stroke in her brother.  SOCIAL HISTORY: she  reports that she has never smoked. She has never used smokeless tobacco. She reports that she does not drink alcohol and does not use drugs.  PAIN AND PAIN MANAGEMENT: Tahira Zimmerman reports a pain score of 5.  Given her pain assessment as noted, treatment options were discussed and the following options were decided upon as a follow-up plan to address the patient's pain: continuation of current treatment plan for pain.    Vitals:    10/07/21 1304   BP: 119/68   Pulse: 85   Resp: 18   Temp: 97.8 °F (36.6 °C)   TempSrc: Oral   SpO2: 94%   Weight: 68.5 kg (151 lb)   Height: 165.1 cm (65\")   PainSc:   5   PainLoc: Head     NUTRITIONAL STATUS:   no issues  KPS: 70:  Cares for self; unable to carry on nomal activity        Review of Systems:   General: No " "fevers, chills, weight change, or drenching night sweats. Skin: +dermal mets.  HEENT: No change in vision or hearing, no headaches.  Neck: No dysphagia or masses.  Heme/Lymph: No easy bruising or bleeding.  Respiratory System: No shortness of breath or cough.  Cardiovascular: No chest pain, palpitations, or dyspnea on exertion.  - Pacemaker. GI: No nausea, vomiting, diarrhea, melena, or hematochezia.  : No dysuria or hematuria.  Endocrine: No heat or cold intolerance. Musculoskeletal: No myalgias or arthralgias.  Neuro: As noted above. Psych: No mood changes or depression. Ext: Denies swelling.        Objective     Vitals:  Vitals:    10/07/21 1304   BP: 119/68   Pulse: 85   Resp: 18   Temp: 97.8 °F (36.6 °C)   TempSrc: Oral   SpO2: 94%   Weight: 68.5 kg (151 lb)   Height: 165.1 cm (65\")   PainSc:   5   PainLoc: Head       PHYSICAL EXAM:  GENERAL: in no apparent distress, sitting comfortably in room. + multiple dermal mets     HEENT: normocephalic, atraumatic. Pupils are equal, round, reactive to light. Sclera anicteric. Conjunctiva not injected. Oropharynx without erythema, ulcerations or thrush.   NECK: +pain likely from C2 region.    LYMPHATIC: no cervical, supraclavicular or axillary adenopathy appreciated bilaterally.   CARDIOVASCULAR: S1 & S2 detected; no murmurs, rubs or gallops.  CHEST: clear to auscultation bilaterally; no wheezes, crackles or rubs. Work of breathing normal.  ABDOMEN: bowel sounds present. Abdomen is soft, nontender, nondistended.   MUSCULOSKELETAL: no tenderness to palpation along the spine or scapulae. Normal range of motion.  EXTREMITIES: no clubbing, cyanosis, edema.  SKIN: no erythema, rashes, ulcerations noted.   NEUROLOGIC: cranial nerves II-XII grossly intact bilaterally. No focal neurologic deficits.  PSYCHIATRIC:  alert, aware, and appropriate.      PERTINENT IMAGING/PATHOLOGY/LABS (Medical Decision Making):     COORDINATION OF CARE: A copy of this note is sent to the referring " provider.    PATHOLOGY (Reviewed):     IMAGING (Reviewed): MRI brain showing new brain mets, clival met with thinning/pending fracture and C2 met.    LABS (Reviewed):  Hematology WBC   Date Value Ref Range Status   10/05/2021 8.00 3.40 - 10.80 10*3/mm3 Final   07/05/2020 4.04 (L) 4.5 - 11.0 10*3/uL Final     RBC   Date Value Ref Range Status   10/05/2021 3.36 (L) 3.77 - 5.28 10*6/mm3 Final   07/05/2020 3.68 (L) 4.0 - 5.2 10*6/uL Final     Hemoglobin   Date Value Ref Range Status   10/05/2021 9.3 (L) 12.0 - 15.9 g/dL Final   07/05/2020 11.5 (L) 12.0 - 16.0 g/dL Final     Hematocrit   Date Value Ref Range Status   10/05/2021 29.2 (L) 34.0 - 46.6 % Final   07/05/2020 35.3 (L) 36.0 - 46.0 % Final     Platelets   Date Value Ref Range Status   10/05/2021 281 140 - 450 10*3/mm3 Final   07/05/2020 158 140 - 440 10*3/uL Final      Chemistry   Lab Results   Component Value Date    GLUCOSE 67 10/05/2021    BUN 17 10/05/2021    CREATININE 1.00 10/05/2021    EGFRIFNONA 55 (L) 10/05/2021    EGFRIFAFRI >60 06/07/2019    BCR 17.0 10/05/2021    K 5.6 (H) 10/05/2021    CO2 24.0 10/05/2021    CALCIUM 8.0 (L) 10/05/2021    ALBUMIN 3.20 (L) 10/05/2021    LABIL2 1.3 07/03/2020    AST 14 10/05/2021    ALT 6 10/05/2021         Assessment/Plan     ASSESSMENT AND PLAN:    1. Bone metastases (HCC)    2. Brain metastases (HCC)       -Worsening disease noted non-CNS and CNS  -Pain from her head/neck and goals of care discussed at length.  -Prior SRS lesions have responded.  New brain lesions, new clival met as noted (radiology fears pending fracture) and symptomatic C2 met.    Discussed hospice vs. CT simulation palliative whole brain/C spine XRT today.    Accompanied by her daughter and they have elected for palliative XRT at this time.  Dosing will be 30 Gy in 10 fractions to help with symptoms, local control of the skull base area and brain lesions.    This assessment comes from my review of the imaging, pathology, physician notes and other  pertinent information as mentioned.    DISPOSITION: Discussed with patient/family and Dr. Ball today at length.  CT sim ordered.  TIME SPENT WITH PATIENT:   I spent 30 minutes caring for Tahira on this date of service. This time includes time spent by me in the following activities: preparing for the visit, reviewing tests, obtaining and/or reviewing a separately obtained history, performing a medically appropriate examination and/or evaluation and care coordination      CC: MD Partha Espinoza MD  10/7/2021  9:19 AM EDT    Addendum: Patient completed CT sim. Was planned to start XRT course but has not been returning phone calls.  She had a fall and did not seek medical attention.  She was working with [employer].  Attempting to get her to come back in for re-eval.    Approved by:     Partha Delong MD  10/19/21  21:05 EDT

## 2021-10-07 NOTE — PROGRESS NOTES
Hematology/Oncology Outpatient Follow Up    PATIENT NAME:Tahira Zimmerman  :1955  MRN: [id number]  PRIMARY CARE PHYSICIAN: Noemy Queen MD  REFERRING PHYSICIAN: Noemy Queen MD    Chief Complaint   Patient presents with   • Follow-up     Malignant neoplasm of right ovary, pernicious anemia      HISTORY OF PRESENT ILLNESS:     1. Recurrent ovarian cancer diagnosis established in 2013.    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  The patient was treated with surgery and then postoperatively with adjuvant chemotherapy consisting of carboplatin and Taxol.  Six months later, she had recurrence of disease intra-abdominally between the rectum and vagina, which was treated with intraabdominal peritoneal chemotherapy.  She had been free of disease since that time.  She reported feeling a mass in the left side of her abdomen approximately six months ago.  This gradually grew and in early 2013 she had her daughter feel the mass.  The daughter works for Dr. David Rogers and the patient at that time also complained of intermittent rectal bleeding.  Consultation with Dr. David Rogers was obtained.  The patient had a CT scan of the abdomen and pelvis performed on 13 revealing left lower quadrant mass measuring 9.7 x 9.3 x 6 cm demonstrating areas of dense calcification, soft tissue density and cystic density within it.  Stromal tumor is felt to be most likely a possibly fibroma.  It is generated with necrosis and calcification.  Possible palpable mass in the rectus muscle is seen with calcification and deformity of the rectus muscle and just below this a second mass intra-abdominally was seen measuring 2 cm in the greatest diameter suspicious for second intraabdominal tumor.  The mass separate from the largest mass measuring 2.6 cm in the dependent portion of pelvis is seen on the right of the  midline.  No retroperitoneal lymphadenopathy was seen.  No free air, free fluid or other abnormality was present.  The patient was scheduled for an outpatient colonoscopy, but had syncopal episode while sitting in the toilet the night before and was brought to the emergency room early in the morning by her daughter and son-in-law.  The patient had apparently stopped breathing for a few seconds and did not have a pulse, but started breathing before CPR could be initiated.  In the emergency room, the patient had an EKG revealing sinus rhythm nonspecific T-wave flattening; metabolic panel revealed a glucose of 148, creatinine of 1.3, CBC was normal.  She was treated with intravenous fluid.  The patient’s case was then discussed with Dr. Anabell Monique and patient was subsequently admitted to [facility].  The patient underwent a colonoscopy by Dr. David Rogers on 06/27/13 revealing sigmoid diverticulosis and grade II internal and external hemorrhoids, but no mass or colitis was seen.  CT-guided biopsy of the mass was performed on 06/27/13 as well revealing metastatic papillary adenocarcinoma with numerous psammoma bodies.  Pathology comment stated prior records were not available; however, with history of ovarian cancer the current biopsy would be consistent with metastases from that malignancy.  Oncology consult was obtained and I initially saw the patient on 06/27/2013 where the patient had claimed to have little bit of pain in the area of disease.  She reported being frequently constipated, denies any weight loss or fevers.  She did report having some night sweats, but thought that was because of her menopause.  On 06/27/13 metastatic workup including labs and CT scans were initiated.  CT scan of the chest on 06/27/13 revealed no acute disease in the chest.  A 1.4 cm size focal area of decreased density was noted in the lateral aspect of the right lobe of the liver consistent with a benign cyst or  hemangioma.  There was a very small hiatal hernia measuring 2.2 cm in diameter.  A CT scan of the head performed 06/27/13 revealed no acute intracranial abnormality noted.  CA-125 was 40 units/ml (0-35), CA 19-9 was 11.8 units/ml (0-35), CEA level was 0.8 ng/ml (0-3), TSH level was 1.09 IU/ml (0.34-5.6).  The patient was in workup for the syncopal episode, a carotid Doppler was performed on June 28 revealing an essentially normal study showing a reduction in diameter from 0-15% bilaterally.  A Holter monitor was completed on 06/27/13, which was unremarkable except for a few premature atrial contractions.  The patient was felt stable and subsequently was discharged home on 06/28/13.  Post discharge, the patient was seen at [facility] on 07/05/13 by Dr. Kathryn Thrasher who discussed treatment options with the patient including surgery.  The patient is in the office today 07/16/13 in follow up post hospitalization.  She is reporting that surgery is planned for July 30th of this month at .  · 7/30/13 to 8/3/13 - The patient was in [facility].  The patient was admitted for surgery for her recurrent ovarian cancer.  The patient had exploratory laparotomy, omentectomy, bowel resection, liver biopsy and appendectomy.  Pathology revealed of the omentum metastatic serous carcinoma of the transverse colon, segmental resection showed metastatic serous carcinoma involving mesenteric fat.  The liver wedge resection showed fibrosclerotic thickening of the hepatic capsule.  Benign liver parenchyma.  No evidence of metastatic tumor.  Appendix showed fibrous obliteration of the lumen.  Negative for metastatic carcinoma.  Rectum and sigmoid colon segmental resection showed metastatic serous carcinoma invading the colorectal mucosa uninvolved by tumor.  Vaginal mass showed metastatic serous carcinoma.  Margins are positive for tumor.  · 9/24/13 - Chemotherapy orders were written for  patient to start carboplatin 550 mg IV day one and Taxol 330 mg IV day one to be cycled every three weeks.  · 10/10/13 - The patient started cycle #1 of chemotherapy consisting of Carboplatin and Taxol.  · 09/25/13 -  is 10.6 normal.  · 10/01/13 - CT of the chest, abdomen and pelvis revealed new reticular nodular occupancy in the anterior segment of the right upper lobe, could reflect an inflammation or infectious etiology or could reflect unusual persistence of metastatic tumor.  New liver lesions, the smaller one appears to be implant on the surface of the liver, the larger may also represent an implant including the intrahepatic.  Several small mesenteric nodes seen throughout the abdomen and pelvis.  · 10/02/13 - Port placed by Dr. Sen.  · 10/24/13 - Orders written to start Neupogen daily and if more than three Neupogen are needed to give Neulasta after next chemo.  ANC is 0.15.  · 10/31/14 - Patient given cycle 2 of chemotherapy with Taxol and Carboplatin.  · 11/21/13 - The patient started cycle 3 of chemotherapy consisting of Carboplatin and Taxol.  · 11/26/13 - CT scan of chest, abdomen and pelvis revealed no evidence of active disease in the chest.  Reticulonodular opacity has resolved.  Metastatic lesion impressing upon the hepatic dome similar to prior exam.  No evidence of a change.  No evidence of new disease.  Postsurgical changes in the pelvis and throughout the retroperitoneum in the large bowel.  Finding containing anterior abdominal wall hernia containing fat tissue and enhancing vessels, 3 cm in diameter.  · 12/2/13 - Orders written to start Neupogen per protocol as well as Aranesp due to low hemoglobin and ANC.  ANC is 0.14.  Hemoglobin is 9.7.  · 12/11/13 - Orders written to hold chemotherapy until next week and decrease dose by 20% due to low platelet count.  Platelet count is 85,000.  · 12/16/13 - The patient received cycle 4 of Carboplatin and Taxol at a 20% dose reduction so Taxol dose  is 260 mg and Carboplatin is 440 mg.  · 12/16/13 - CA-125 12.6 (N).  · 12/19/13 - PET/CT scan.  Impression:  1. There is no evidence of hypermetabolism in the liver.  Specifically of the dominant lesion in or adjacent to the right hepatic dome that was seen and described on the recent CT study of 11/26/2013.  It is photopenic relative to the surrounding liver.  2.  There is no evidence of hypermetabolic soft issue mass lesion or free fluid in the abdomen or pelvis.  3.  The tonsillar tissues are slightly enlarged and have moderate activity level.  This maybe physiologic.  Correlation with oral cavity, examination is recommended.  4.  Mild amount of activity in the right level II jugular lymph chain without focally enlarged lymph nodes is of questionable significance.  · 1/6/13 - The patient received cycle 5 of chemotherapy with Taxol and Carboplatin.  · 1/27/14 - The patient started on cycle 6 of Taxol and Carboplatin.  · 2/18/14 - CT of the abdomen and pelvis with contrast; stable lesions impressing upon the hepatic dome, unchanged compared to the prior exam.  Multiple anterior abdominal wall hernias re-demonstrated.  Those above the umbilicus contain omental fat.  Below and to the left of the umbilicus as anterior abdominal hernia containing omental fat in nondilated small bowel which is larger than seen previously.  · 2/20/14 - The patient received cycle 7 of chemotherapy with Taxol and Carboplatin.   · 3/11/14 - Order written to discontinue chemotherapy due to patient has completed 7 cycles of chemotherapy and CT scans suggest possible remission.  · 3/11/14 -  11.0 (N).  · 06/05/14 - CT scan of the chest abdomen and pelvis with contrast: There are multiple anterior abdominal wall hernias.  There are 2 larger anterior abdominal wall hernias at the level of the transverse colon, which contain omental fat.  There are at least 2 small anterior abdominal wall hernias that contain omental fat.  There is a  moderate sized anterior abdominal wall hernia at the level of the umbilicus, eccentric to the left, which contain non-dilated bowel.  Interval decrease in the size of two deposit impressing on the liver.  The third tiny deposit has been stable.  · 8/19/14 -  10.4 (N).  · 12/19/14 -   10.0 (N)  · 1/7/15 - CT chest, abdomen and pelvis with stable appearance of the chest with several micronodules. Small hiatal hernia, slightly larger than previous exam, increased from 1.5 to 2.5 cm in diameter. Bochdalek hernia unchanged. Multiple hepatic lesions. The two dominant lesions at the right hepatic dome had decreased in size from previous. The remaining tiny nodules measured 3-5 mm in diameter and were unchanged. There was no evidence of new intra-abdominal or pelvic mass or free fluid. There were multiple anterior abdominal wall hernias which had increased in size.   · 7/27/15 - Comprehensive metabolic panel with no significant abnormalities. CA-125 of 21 (0-35).   · 10/26/15 - WBC 6, hemoglobin 13, platelet count 204,000. Heart rate 47. CA-125 of 20 (0-35).    · 2/18/16 - CT chest with contrast with stable micronodular density seen in the lungs without significant change from prior exam. CT abdomen and pelvis with regression of liver lesions with no new evidence of metastasis. Multiple ventral hernias again noted without significant change from prior exam. Creatinine 0.9 (0.6-1.3).    · 2/25/16 - Patient hospitalized at [facility] between 2/25/16 and 2/27/16 with pyelonephritis secondary to E-coli. She was given two days of IV Rocephin and then placed on oral Levaquin. She was asked to hold her Coumadin, but then had nausea and vomiting because of Levaquin and stopped the Levaquin also. Her CA-125 was 32 (0-35) and CEA 1.3 (0-5). Her urine grew >100,000 E-coli. CT of the abdomen and pelvis was done which revealed 4.1 x 1.8 cm sized mild ovoid area of decreased density in the liver parenchyma along  the anteromedial aspect of the dome of the right lobe of the liver, unchanged significantly since the previous study of 2/18/16. There was suspicion of a very small pericardial effusion. There was suspicion of a small hiatal hernia. There were several hernias in the anterior abdominal wall. A 3.5 x 3.1 cm incised well-circumscribed abnormal density in the left retroperitoneal fatty soft tissue at the level of the left iliac crest was suggestive of tumor recurrence. There was an abnormal density in the left retroperitoneal soft tissues in the left flank that partially surrounded the left kidney and extended downward to the left pelvic area. Diverticulosis of the distal descending colon and sigmoid colon were seen.     · 3/8/16 - Patient prescribed Bactrim DS 1 p.o. b.i.d. for 10 days for pyelonephritis, which was partially treated with Rocephin and Levaquin. Urine with 40,000 E-coli treated with Macrobid for 7 days.  of 20 (0-35).    · 3/31/16 - PET scan with area of low density anteriorly in the right lobe of the liver with no significant radiopharmaceutical uptake to suggest malignancy. Multiple round soft tissue density masses showing increased radiopharmaceutical activity and multiple anterior abdominal wall hernias.   · 5/10/16 - Patient complains of venous enlargement of the left chest wall around the port. Has not been seen by  yet, but has an appointment on 5/23/16. Complained of a cough.   · 5/12/16 - Infusaport contrast study with no evidence of fibrin sheath. Chest x-ray with mild cardiac prominence.   · 5/23/16 - Patient seen in consultation by Sheng Bowman M.D. at [facility] and a chemotherapy trial recommended.   · 6/1/16 -   of 27.1 (0-35).    · 6/14/16 - WBC 5.71, hemoglobin 12.4, platelet count 188,000. Patient is scheduled for surgery at  on 6/16/16 after further discussion with  physicians. Discussed adjuvant chemotherapy.   · 6/16/16 - Excisional biopsy of abdominal  wall mass performed by Sheng Bowman M.D. at [facility] with pathology revealing metastatic high-grade papillary serous carcinoma.   · 7/7/16 - Received a call from the patient that Tramadol was not working and that she was given Norco #24 after her biopsy at  which did help her pain. Patient prescribed Norco 5/325 one p.o. q. 4-6 hours p.r.n. #60 with no refills. Echocardiogram with left ventricular inferior wall hypokinesis with ejection fraction of 50-55%.   · 7/8/16 - Patient seen in consultation by Sheng Bowman M.D. in consultation at  for metastatic high-grade papillary serous carcinoma diagnosed by excisional biopsy on 6/16/16 with carcinomatosis and patient not a surgical resection candidate. Genetic sequencing and susceptibility testing of tumor was ordered. Biopsy was done of anterior abdominal wall.   · 7/7/16 - Echocardiogram with left atrial enlargement. Mild mitral regurgitation. Left ventricular proximal inferior wall hypokinesis with ejection fraction of 50-55%.   · 7/11/16 - While waiting for genomics results, in order not to delay treatment further, order written for Taxotere 60 mg/M2 IV over one hour followed by Carboplatin AUC 5 over one hour, cycles to be repeated every three weeks.  Comprehensive metabolic panel normal.  26 (0-35).    · 725/16 - Pain contract signed for use of Norco.   · 8/5/16 - Patient stated that she experienced a red rash and was itchy post taking Norco. Norco discontinued and Tramadol refilled.   · 8/16/16 - Patient started cycle 1 chemotherapy. WBC 7.1, hemoglobin 11.2, MCV 88.7, platelet count 94,000. Patient complained of nausea for a week post chemo. Already getting Emend, Aloxi and Decadron. Added Omeprazole 40 mg daily orally.   · 8/17/16 - Urine culture with >100,000 E-coli.   · 8/25/16 - Cycle 2 chemotherapy given.   · 9/9/16 - Magnesium 1.3, replaced intravenously. Potassium 3.4 (3.6-5.1), increased oral potassium supplementation.     · 9/16/16 - Cycle 3 chemotherapy given.   · 9/19/16 - Comprehensive metabolic panel with no significant abnormality.   · 9/20/16 - CT abdomen and pelvis with evidence of progressive metastatic disease in the abdomen and pelvis with interval enlargement of several soft tissue attenuations and subcutaneous nodules in the anterior abdominal wall. Interval enlargement of a left pelvic soft tissue attenuation mass. A lentiform area of low attenuation in the dome of the liver stable in size. Multiple anterior abdominal wall hernias containing fat and/or bowel. Marked pelvic floor laxity. CT chest negative for evidence of metastatic disease. Tiny pulmonary nodules in the right lung stable since 2013 consistent with a benign etiology.   · 9/23/16 - Macrobid 100 mg p.o. b.i.d. x7 days prescribed for UTI. Current chemotherapy discontinued after discussion and new chemotherapy orders written. Carboplatin AUC-5 IV day 1 and Doxil (Liposomal Doxorubicin) 30 mg/M2 IV day 1 to cycle q. 21 days.  of 18 (0-35). Magnesium 1.6, replaced intravenously. Comprehensive metabolic panel with potassium 3.4 (3.6-5.1).     · 10/13/16 - Patient started on cycle 1 of Carboplatin and Liposomal Doxorubicin followed by Neulasta.   · 11/3/16 - Cycle 2 Carbo and Doxil given.   · 11/17/16 - Magnesium 1.0, replaced intravenously. Oral potassium supplement increased.   · 11/29/16 - CT chest with small non-calcified right pulmonary nodules unchanged. Benign bone island in the midthoracic vertebral body along with benign bony hemangiomas in the middle thoracic vertebral bodies. CT abdomen and pelvis with mild progressive metastatic disease from CT of September 2016. Anterior abdominal wall mass superiorly mildly increased in size with new area noted as described measuring 3 x 1.7 cm. 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 Stable area of decreased uptake in the dome of the liver not hypermetabolic  on recent PET scan, likely representing cyst or focal fatty infiltration. Stable small hiatal hernia, fat-containing Bochdalek’s hernia and anterior abdominal wall hernias with stable pelvic floor relaxation.    · 12/1/16 - Cycle 3 chemotherapy given.   · 12/8/16 - Current chemotherapy discontinued and patient started on Gemcitabine 1000 mg/M2 IV days 1, 8, 15 q. 28 days.   · 12/22/16 - Patient seen in followup by Juliana Roy M.D. at the pain clinic, treated with Oxycodone and Lyrica. Transaminases normal. Patient started cycle 1 chemotherapy.   · 12/29/16 - Magnesium 1.1 (1.8-2.5), replaced intravenously.   · 1/5/17 - Cycle 1 day 15 chemotherapy held as patient leaving for vacation to [place]. Chemotherapy dose decreased by 20%. Patient complains of diarrhea for which Imodium prescribed.   · 1/11/17 - BRCA-1 and BRCA-2 negative.   · 1/12/17 - Echocardiogram with ejection fraction 60% or greater.   · 1/19/17 - Comprehensive metabolic panel with no significant abnormality. Patient started cycle 2 chemotherapy.   · 2/2/17 - Urine with >100,000 E-coli treated with Cipro 500 mg p.o. b.i.d. x1 week.   · 2/6/17 - Magnesium 1.1 (1.8-2.5), replaced intravenously.    · 2/23/17 - Patient started cycle 3 chemotherapy.   · 2/27/17 - CT chest with interval development of too numerous to count very small pulmonary nodules, ill-defined ground glass opacities concerning for development of pulmonary metastatic disease. Stable left-sided chest port. CT abdomen and pelvis with stable appearance of multiple small soft tissue densities within the abdomen near midline subcutaneous fat of the abdominal wall. Interval decrease in size of largely calcified left pelvic mass and multiple fat in bowel containing small ventral hernias.   · 3/6/17 - Pulmonary consultation obtained for lung nodules. Discussed CT results with patient. Decision made to continue present chemotherapy until further lung evaluation as abdominal disease  better.  of 18 (0-35).    · 3/16/17 - Patient started cycle 4 chemotherapy, but treatment held from day 8 onwards because of hospitalization.   · 3/19/17 - Patient was hospitalized at [facility] between 3/19/17 and 3/25/17 with chest pain. Chest x-ray had revealed increased mild bibasilar airspace disease. Troponin I and EKG’s were negative. INR was elevated. Patient was treated with antibiotics. EGD was performed revealing small hiatal hernia and esophageal dilatation was performed. Bronchoscopy was performed also with no intrabronchial lesions identified. IgA was 51 (), IgG 320 (600-1500) and IgM 19 (). Lexiscan nuclear stress test had no evidence of reversible myocardial ischemia with normal left ventricular ejection fraction of 58%. CT chest had development of patchy bilateral ground glass opacities most pronounced involving the left upper lobe and bilateral lower lobes. Multiple tiny bilateral pulmonary nodules were less conspicuous. The patient underwent a bronchoscopy by Jerica Esparza M.D. with right upper lobe bronchoalveolar lavage revealing benign squamous cells and bronchial cells with pulmonary macrophages present. There was mild acute inflammation. Negative for malignant cells. Prilosec was changed to Famotidine for hypomagnesemia.    · 4/6/17 - Magnesium 1.2 (1.8-2.5). Comprehensive metabolic panel with no significant abnormality. Patient seen in follow-up by Fito Ram M.D. at [facility]. Treated with Lyrica and Oxycodone.    · 5/4/17 - Patient complains of a rash itching on her right upper arm. Eczematous rash noted and patient prescribed Cyclocort 0.1% b.i.d. Magnesium infusions continued with each chemo. Order written to resume chemotherapy.   · 5/16/17 - Cycle 5 chemotherapy started.   · 5/26/17 - Magnesium 1.4 (1.8-2.5), replaced intravenously. Potassium 2.9 (3.6-5.1), KCL increased to 20 mEq three in the morning and three in the evenings.   · 5/30/17 - PET scan with  remaining metabolic activity within the nodular areas. The suggested mass which is partially calcified and necrotic within the left aspect of the pelvis seems slightly larger with increased metabolic activity. There was more calcification in these areas compared to prior study, suggesting inflammation.      · 6/8/17 - Patient complaining of shortness of breath. Does take HCTZ at home. Chest x-ray ordered and chemotherapy continued along with weekly magnesium replacement. Chest x-ray with no acute cardiopulmonary abnormalities.   · 6/13/17 - Patient received cycle 6 of chemotherapy.   · 7/31/17 - WBC 5.0, hemoglobin 11.2, MCV 89.7, platelet count 217,000. Patient is to resume chemotherapy starting with cycle 7 on Wednesday of this week.  of 19 (<35). Potassium 3.3 (3.5-5.3).     · 8/2/17 - Patient began cycle 7 of chemotherapy.   · 8/30/17 - Patient started cycle 8 chemotherapy.   · 9/5/17 - Patient seen in followup at Pain Management by Fito Ram M.D. and continued on treatment with Oxycodone and Lyrica.   · 9/13/17 - Patient was hospitalized at [facility] for a day with dizziness secondary to dehydration, hypokalemia and anemia. She was given 1 unit of packed red blood cells and electrolytes were replaced. Chest x-ray revealed a subtle opacity in the left lateral lung base favored to represent atelectasis. Magnesium 1.3 (1.5-2.5), patient continued on replacement.    · 9/22/17 - Chemotherapy and magnesium replacement continued with decrease in chemotherapy dose by 10% secondary to cytopenias.   · 9/25/17 - Cycle 9 chemotherapy started.   · 10/12/17 - CT of the chest with contrast showed several tiny pulmonary nodules. One within the right lower lobe appears new from prior exams. Two adjacent foci of nodularity within the medial right lower lobe suggestive of minor infectious or inflammatory process. CT of the abdomen and pelvis with contrast which showed soft tissue nodules along the anterior abdominal  and pelvic walls unchanged from previous scan. Also a partially calcified mass within the left pelvis unchanged. No acute process identified within the abdomen or pelvis. Several left paracentral ventral hernias unchanged. No evidence of acute bowel obstruction.   · 10/16/17 - Order written for Levaquin 500 mg p.o. daily as the patient states that she has had a productive cough, fever and chills and with the results of the CT scan showing a possible infectious versus inflammatory process. Order also written to continue chemotherapy and magnesium replacement as previously prescribed.   · 10/23/17 - Cycle 10 chemotherapy started.   · 11/19/17 - Patient went to the Emergency Room at [facility] complaining of right lower extremity redness and fever for a day. Venous Doppler had no evidence of a DVT and patient was discharged home on Clindamycin.   · 11/21/17 -  of 17 (0-35).   · 12/4/17 - Cycle 11 chemotherapy started.   · 12/20/17 - PET scan with multiple hypermetabolic soft tissue nodules abutting the anterior abdominal wall, stable from previous examination. Peripherally calcified left lower quadrant mass near the ascending colon stable in size demonstrating no hypermetabolic uptake. Previously had maximum SUV of 7. Right medial basilar pulmonary nodules resolved.   · 12/22/17 - CT left lower extremity with soft tissue swelling with skin thickening present along the anterior and medial aspect of the leg, slightly more pronounced around the palpable marker seen within the upper medial aspect of the lower extremity.   · 12/26/17 - Elastic stockings prescribed for lower extremity edema.   · 1/8/18 - Patient hospitalized at [facility] between 1/8/18 and 1/11/18 with fever of up to 101 degrees and painful rash on the lower extremities of several weeks’ duration. She was found to be hypokalemic and MRI of the lower extremities revealed thickening and swelling. Chest x-ray had no acute cardiopulmonary abnormality. Skin biopsy was  performed by Surgery revealing stasis dermatitis and no evidence of malignancy. Infectious Disease consultation was obtained and IV antibiotics were stopped. Blood cultures had no growth.   · 1/22/18 - Patient asked to start wearing elastic stockings which she has not started yet. She was given a prescription for Dyazide 1 p.o. daily.   · 2/26/18 - Ordered chemo to resume again. Patient unaware that she was supposed to resume chemo after her last visit in January. Continue magnesium infusions on day 1, 8 and 15. Prescribed Levaquin 500 mg p.o. daily x10 days for productive cough x1 week. History of viral illness consistent with influenza.  of 18 (0-35). Chest x-ray with no acute process.    · 3/5/18 - Cycle 12 chemotherapy started.   · 3/26/18 - Options discussed with patient. Decision made to discontinue IV Gemzar and orders written to start on Niraparib (Zejula) 300 mg p.o. daily as maintenance therapy.   · 4/11/18 - Niraparib discontinued due to insurance denial. Orders written to start Carboplatin-AUC 5 IV day 1 cycling every 21 days. Orders written for Neulasta 6 mg subcutaneous kit day 1 with palliative treatment intent and expected duration of treatment three months.   · 4/12/18 - CT chest, abdomen and pelvis with contrast revealed numerous tiny centrilobular nodules in the lungs favored to reflect infectious or inflammatory process. Nodules ranged 1-3 mm in size and are new from prior studies with metastatic disease not entirely excluded. Stable small hiatal hernia. Interval progression of metastatic disease involving the subcutaneous tissues at the ventral abdominal wall. Multiple soft tissue density nodules previously shown to be hypermetabolic on PET scan. New small crescent of low attenuation material along the periphery of the spleen with similar finding at the dome of the liver. Findings are concerning for peritoneal metastases. Density of the dome of the liver remained unchanged from multiple  prior studies. Stable calcified mass in the left pelvic sidewall. Urinary bladder wall thickening.   · 4/23/18 - Cycle 1 chemotherapy with Carboplatin administered along with Neulasta injection.   · 4/26/18 - Neulasta discontinued due to insurance denial.   · 5/14/18 - Patient received cycle 2 Carboplatin.   · 6/5/18 - Cycle 3 day 1 chemotherapy with Carboplatin administered.   · 6/20/18 - CT chest, abdomen and pelvis at [facility] showed re-demonstration of soft tissue mass in the ventral wall hernia left of the midline as well as the dome of the liver, likely representing metastatic disease similar as compared to the prior study. Additional calcified lesions present in the upper left hemipelvis and along the anterior abdominal wall to the right of the midline also likely representing metastatic disease. No definite new lesions were identified. Moderate to large stool burden likely related to mild constipation was present. No suspicious lung nodules were identified. The previously-described ground glass nodules appeared to have resolved. There was a stable subpleural nodule in the right upper lobe measuring 3 mm present since 2014 without significant changes.   · 6/26/18 - Cycle 4 day 1 Carboplatin administered with addition of Neulasta for febrile neutropenia prophylaxis.   · 6/29/18 - Reviewed CT scans with patient. Orders written to discontinue Carboplatin and Neulasta and plan to start Niraparib 300 mg by mouth daily as maintenance therapy. Prescribed Amlodipine 2.5 mg p.o. daily for chemo-induced hypertension.   · 7/18/18 - Orders written to increase weekly Magnesium Sulfate to 3 g IV weekly due to continued hypomagnesemia.   · 8/1/18 - Patient reports she has not received Niraparib (Zejula) to date.   · 8/30/18 - Patient’s phone was not working so though Niraparib approved, the drug company had not been able to get ahold of her. Patient supplied with drug company number so that she can start taking  the pills.   · 9/6/18 -  of 20 (N).   · 9/18/18 - Urinalysis done for dysuria and back pain. Urine culture grew 20,000 to 50,000 colonies of urogenital ruy. Prescribed Bactrim DS one tablet twice daily for one week.   · September 2018 - Patient initiated on Zejula 300 mg p.o. daily.   · 10/1/18 - WBC 3.5, hemoglobin 12, platelet count 74,000. Niraparib (Zejula) dose held and then resumed when platelets above 100,000 at a dose of 200 mg daily.   · 10/15/18 - Patient received Niraparib (Zejula) at 200 mg p.o. daily with a platelet count of 198,000.   · 11/7/18 - WBC 6, hemoglobin 11.6, MCV 96.2, platelet count 137,000. Patient claims to have stopped taking Niraparib a few days prior because of cough. Asked to resume taking it. Ciprofloxacin 500 mg p.o. twice daily for one week for bronchitis.   · 12/3/18 - Magnesium Sulfate infusions changed to every two weeks, to send mag level before and give 3 grams. DC’d Magnesium Oxide and started Magnesium Plus Protein two pills twice daily.   · 1/4/19 - CT chest, abdomen and pelvis with new patchy nodular areas of airspace consolidation within the bilateral upper lobes, left greater than right, favored to be infectious or inflammatory. Interval enlargement of the peritoneal soft tissue masses within the pelvis compatible with progression of disease. Enlarging ventral hernia now containing multiple loops of small bowel and colon.   · 1/7/19 - Patient has not been coming in for weekly magnesium replacement as nursing has not been able to get ahold of her. Results of CT’s discussed with patient. Decision made to change her to IV chemotherapy with Carboplatin AUC-5 day 1 and pegylated liposomal Doxorubicin (Doxil) 30 mg/M2 IV day 1 to cycle every four weeks. Patient made aware of the limited choices of her treatment available.   · 1/31/19 - Echocardiogram showed LVEF 50-55% and otherwise normal echo Doppler study.   · 2/4/19 - New chemotherapy not initiated to date with  difficulty contacting patient for echocardiogram. Neulasta On-Pro 6 mg ordered with chemotherapy due to extensive prior exposure to chemotherapy, increasing risk of febrile neutropenia, history of neutropenic events on prior chemotherapy regimens.   · 2/15/19 - Patient started cycle 1 chemotherapy with Neulasta support.   · 3/6/19 -  of 28 (0-35).   · 3/15/19 - Cycle 2 Carboplatin with Liposomal Doxorubicin (Doxil) and Neulasta support initiated.     · 4/10/19 - Patient was requested to followup with Dr. Queen regarding hypotension and blood pressure medication dosing. Patient appears to be tolerating treatment well with cytopenias not requiring dose adjustments. No Doxil-related skin or mucus membrane toxicities or other significant toxicities to date.   · 4/12/19 - Cycle 3 chemotherapy given.   · 4/16/19 - CT chest, abdomen and pelvis with no evidence of active metastasis to the chest. Several tree-in-bud nodules within the periphery of the inferior right upper lobe likely infectious or inflammatory. Disease burden within abdomen and pelvis stable to slightly increased from prior CT. Specifically, a soft tissue mass along the right rectus muscle slightly increased in bulk. Multiple ventral hernias, some containing loops of bowel, with no evidence of acute bowel obstruction.   · 5/8/19 - Discussed CT results with patient. Overall stable disease and would like to continue same treatment. Dove soap and Hydrocortisone Cream p.r.n. prescribed for scattered rash on back.   · 5/10/19 - Cycle 4 Carboplatin and Liposomal Doxorubicin initiated.   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 It was ER positive, HER2/zuleima negative, PD-L1 negative. There was TOPO1 positivity and TUBB3 positivity. TMB was low (two mutations per megabyte MUTS/MB) and MSI was stable. There were 13 therapies considered with increased benefit. The 13 therapies with increased  benefit included Fulvestrant, Irinotecan, Letrozole, Topotecan, Anastrazole, Exemestane, Tamoxifen, all of which were referenced to [facility] as well as Abemaciclib, Everolimus, Gefitinib, Palbociclib, Ribociclib and Toremifene.     · 6/5/19 echocardiogram with preserved LV systolic function with EF around 60%.  · 6/7/19 cycle 5 chemotherapy with Neulasta support given.  Patient continued on mag oxide 400 mg p.o. twice daily and weekly IV 3 g mag sulfate infusions for persistent hypomagnesemia.  · 7/5/2019- cycle 6 chemotherapy with carboplatin and doxorubicin with Neulasta port initiated.  Ca125:  21 (0-35).  · 7/23/2019-CT chest abdomen and pelvis compared to CT from 4/16/2019 revealed multiple small pulmonary nodules unchanged from prior examination within the right upper and left lower lobes.  Complex ventral hernia similar to prior exam.  Soft tissue nodule along the right lateral margin of the right of the midline measuring 12 x 10 mm unchanged in size.  Irregular soft tissue nodular thickening along the margin of the most inferior aspect of the complex ventral hernia with thickness measuring up to 13 mm similar to prior examination.  Extensive calcified and soft tissue mass within the left lower quadrant decrease in size now measuring 2.6 x 2.3 cm previously 3.0 x 2.5 cm.  Partially calcified mass involving the right rectus sheath measuring 3.1 x 2.7 cm previously measured 3.3 x 2.9 cm.  Densely calcified mass measuring 2.1 x 1.6 cm unchanged previously measured 2 x 1.7 cm.  Right external iliac chain lymph node measuring 9 mm previously measured 5 mm.  · 8/2/2019 cycle 7 chemotherapy given.  · 8/30/2019-cycle 8 chemotherapy with carboplatin and doxorubicin with Neulasta support given.  · 9/27/2019 cycle 9 chemotherapy given.  · 10/1/19 - Echocardiogram showed Normal LV size and contractility EF of 60-65%. Normal RV size. Borderline left atrial enlargement. Aortic valve, mitral valve, tricuspid valve appears  structurally normal, no significant regurgitation seen.No pericardial effusion seen. Proximal aorta appears normal in size.  · 10/18/19 - Magnesium 1.5 (1.8-2.5).  IV magnesium replacement continued.  · 10/25/2019 cycle 10 chemotherapy given.  · 10/29/2019 patient had CT scan of the chest abdomen and pelvis-there is a new 2 mm nodule in the left lower lobe with a stable 2 mm nodule in the left lower lobe.  Follow-up in 6 months was recommended.  Stable multiple soft tissue density and calcified nodules within the ventral abdominal wall and left retroperitoneal fat consistent with nonviable metastatic disease.  These nodules have either decreased in size or stable.  · 11/22/2019 10 received cycle 11 of chemotherapy with Doxil and carboplatinum with Neulasta  · 12/8/2019 to 12/11/2019 patient was admitted to the hospital for neutropenic fever.  · 12/20/2019 patient received cycle 12 of chemotherapy with Doxil and carboplatinum with Neulasta  · 1/13/20: 2 D echo was normal with EF 56% to 60%  · 1/24/20-Patient received cycle 13 of combination chemotherapy with carboplatinum and Doxil  · 2/3/2020 patient had a  which was 25.4  · 2/21/2020-patient received cycle 14 of chemotherapy with carboplatinum and Doxil  · 3/6/2020 patient had CT scan of the chest, abdomen and pelvis this revealed a 3 mm nodule in the left lower lobe present on prior CT of the abdomen unchanged from July 2019.  Stable 3 mm right upper lobe nodule.  There is a lymph node in the AP window measuring 8 x 9 mm prominent left hilar lymph node measuring 12 mm previously was 7 x 7 mm no significant adenopathy was seen in the abdomen there is an area of irregular soft tissue in the left paramidline ventral hernia which is stable measuring 5.7 cm partially calcified mass in the right rectus measuring 3 x 2.5 cm which is also stable the prominent lymph nodes in the left mid hilum and mediastinum may be reactive or metastatic disease  · 4/17/2020-patient  had cycle 15 of single agent carboplatinum  · 5/26/2020 patient had CT scan of the chest, abdomen and pelvis showed 3 new lung nodules measuring 7 mm in the left upper lobe, 5 mm in the left lower lobe and 4 mm in the right lower lobe.  There were additional small lung nodules which were unchanged.  Mildly prominent mediastinal and bilateral hilar lymph nodes mildly increased in size.  Right hilar node 9 mm previously was 5 mm.  Carinal node 9 mm previously was 6 mm.  The nodule at the right side of the hernia sac has increased to 1.5 cm from 1.2 cm.  Also a nodule in the subcutaneous fat currently measures 2.4 was previously 2 cm.  · 5/15/2020-patient received cycle 16 of carboplatinum  · Since the last visit in the office patient patient developed dizzy spell and fell. For that reason she was taken to the emergency room at [facility].    · 7/3/2020 she had brain MRI which showed an 8 mm focus of abnormality in the left aspect of the posterior fossa corresponding to a dural based enhancing lesion thought to represent a calcified meningioma vessels calcified dural based metastasis.  This lesion has a slight local mass-effect and a small amount of surrounding edema.  There is also a 4 to 5 mm rounded focus of abnormal nodular enhancement along the cortex of the left parietal lobe but no significant mass-effect.  This is nonspecific could represent neoplastic process, infectious/inflammatory process or granulomatous disease.  · Patient was seen by neurosurgery, follow-up brain MRI in 8 weeks has been recommended by Dr. Hartman  · 7/9/2020 patient was initiated on single agent topotecan cycle 1 day 1  · 7/16/2020 she received the 8 topotecan  · 8/6/2020 patient received cycle 1 day 15 of topotecan  · 9/2/2020 patient had brain MRI multiple hospital which showed interval increase in size of the metastasis in the left parietal lobe now measuring 9 x 7 mm.  Previously was 5 mm.  There has been interval increase in size of the  left superior cerebellar metastases now measuring 1.3 cm previously was 8 mm.  There was no new metastatic disease identified.  · 9/11/2020 patient received cycle 2-day 15 of single agent topotecan  · Was admitted to the hospital 10/1/2020 for supratherapeutic INR  · 10/13/2020 patient was seen by Dr. Delong she has started stereotactic brain radiation to be completed on 10/28/2020  · 11/6/2020 patient received cycle 3-day 1 of chemotherapy with topotecan  · 11/13/2020 patient received cycle 3-day 8 of topotecan  · 11/30/2020 patient had CT scan of the chest, abdomen and pelvis this showed new reticular nodular interstitial thickening within the right lung mostly in the right middle lobe and right lower lobe worrisome for lymphangitic spread of cancer.  Evidence of progression of metastatic disease in the chest, abdomen and pelvis the new tiny sclerotic foci within the spine worrisome for osseous metastatic disease.  · 12/17/2020 - Discontinued topotecan due to progressive disease with orders written to begin Alimta 500 mg per metered squared IV q. 21 days  · 12/30/2020 - Started cycle 1 day 1 Alimta   · 1/18/2021 patient had bone scan which showed chest metastatic disease.  Xgeva has been ordered  · 3/12/2021 patient received cycle 1 of combination chemotherapy with cisplatinum and  Gemzar  · 4/23/21: Patient had cycle 2 day with cis-platinum and Gemzar  · 4/30/2021: Patient received cycle 2-day 8 of chemotherapy with cis-platinum and Gemzar  · 5/6/2021 patient had a CT scan of the chest which shows evidence of progressive disease  · 6/15/2021: Patient was admitted to the hospital for supratherapeutic INR, probable pneumonia.  She has since been discharged.  · 7/9/2021 patient received cycle 1 day 1 of single agent vinorelbine  · 7/16/2021 patient received cycle 1 day 8 of single agent Navelbine  · 8/9/2021 patient received cycle 2-day 1 of single agent vinorelbine  · August 21 patient was hospitalized for COVID-19  infection with respiratory failure  · 10/5/2021 patient had brain MRI which showed evidence of progressive disease in the brain, osseous metastatic and multiple lesions in the calvarium, skull base and upper cervical spine. Abnormal signal within the posterior superior aspect of the clivus with marked cortical thickening at risk for impending pathologic fracture. Patient was seen by Dr. Delong who has offered her whole brain radiation and also including the cervical spine area in the field of radiation is being considered     2. Deep vein thrombosis of left upper extremity diagnosis established in October of 2013.  · 10/16/13 - Venous Doppler of left upper extremity.  Impression:  Deep vein thrombosis involving the subclavian, axillary and brachial veins on the left side, superficial vein thrombosis involving the left basilic vein.  · 10/16/13 - Order written for Lovenox 120 mg subcutaneously daily until INR is in therapeutic range.  Order written for Coumadin 5 mg p.o. daily.  The patient is to follow PT and INR protocol.  · 5/20/16 - Venous Doppler bilateral lower extremities normal.  · 7/30/19 - Patient continued on Coumadin following the INR protocol.      3. Anemia diagnosis established in November 2013 and pernicious anemia diagnosis established March 2016.   · 11/15/13 - Hemoglobin is 7.8.  · 12/2/13 - Orders written to start Aranesp 200 mg subcutaneous every two weeks due to low hemoglobin secondary to chemo induced anemia.  Hemoglobin is 9.7.  Anemia workup is ordered.  · 12/03/13 - Ferritin 179.8 (N), folic acid 8.3 (N), vitamin B12 314, iron 104 (N), TIBC 298 (N), iron saturation 35 (N), Reticulocyte count 0.88 (N).  EPO level 124 (N).  Haptoglobin 22 (H).  · 10/22/14 - Hemoglobin 12.5, hematocrit 37.3, MCV 91.6.  · 10/23/14 - Anemia resolved.   · 3/8/16 - WBC 5.8, hemoglobin 11.7, MCV 90.3, platelet count 245,000. Vitamin B12 of 189 (180-914), ferritin 61 (), iron 91 (), TIBC 345 (228-428).    · 3/15/16 -  ().        · 3/22/16 - Intrinsic factor antibody positive, antiparietal antibody negative. Vitamin B12 at 1000 mcg IM weekly started, but the patient after receiving first dose on 3/22/16 did not come back for injections until 4/13/16.   · 4/13/16 - WBC 5.1, hemoglobin 12.5, MCV 87.9, platelet count 201,000. Patient given B12 injection and then continued at home.   · 5/10/16 - WBC 5.1, hemoglobin 12.5, platelet count 201,000.   · 6/14/16 - Monthly Vitamin B12 injections continued at home.   · 7/11/16 - WBC 5.48, hemoglobin 12.7, MCV 86.2, platelet count 200,000.   · 8/16/16 - Patient continued on monthly Vitamin B12 injections at home.   · 1/5/17 - WBC 2.6 with 38% neutrophils, 56% lymphocytes, 5% monocytes, hemoglobin 10.5, .3, platelet count 63,000. Patient continued on Vitamin B12 injections 1000 mcg IM monthly at home.   · 3/20/17 - Retic 0.41 (0.5-1.5), creatinine 1.0 (0.4-1.0). Iron 12 (), TIBC 270 (228-428), ferritin 259 (), haptoglobin 340 (), TSH 0.43 (0.34-5.6), folate 6.0 (5.9-24.8). Serum protein electrophoresis revealed decreased gammaglobulins. Serum EDU had no monoclonal gammopathy identified. Stool Hemoccult was negative.   · 6/8/17 - WBC 6.3, hemoglobin 8.3, MCV 92.4, platelet count 50,000. Procrit 40,000 units subq weekly added for chemotherapy-induced anemia. Patient continued on Vitamin B12 at 1000 mcg IM monthly at home.   · 7/5/17 - Iron 33 (), TIBC 328 (228-428), ferritin 211 (), TSH 2.46 (0.34-5.6).       · 7/31/17 - WBC 5.0, hemoglobin 11.2, MCV 89.7, platelet count 217,000. We will continue with the Procrit 40,000 units subq weekly for chemo-induced anemia when the hemoglobin falls below 10.0 and the patient is also to continue with the Vitamin B12 at 1000 mcg IM monthly at home. Creatinine 1.24 (0.5-0.99).    · 8/28/17 - WBC 9.6, hemoglobin 8.7, MCV 93.7, platelet count 133,000. Patient is to continue with the Procrit 40,000  units subq weekly and will receive that today. She is also to continue with the Vitamin B12 at 1000 mcg IM monthly at home.  · 10/16/17 - WBC 7.5, hemoglobin 9.6, MCV 98.4, platelet count 195,000. Patient is to continue with Procrit 40,000 units subq weekly and will receive Procrit today.   · 1/1/18 - Creatinine 1.3 (0.4-1.0).    · 2/19/18 - Procrit given for hemoglobin 9.9.   · 2/26/18 - Continue Procrit 40,000 units weekly p.r.n. hemoglobin <10. Continue Vitamin B12 at 1000 mcg IM monthly at home.   · 3/26/18 - Hemoglobin 8.3. Procrit dose increased to 60,000 units weekly. Iron 29 (), TIBC 306 (228-428), and iron sat 9% (15-50). Iron 29 (), TIBC 306 (228-428), iron saturation 9% (15-50).   · 3/27/18 - Order written to start Ferrous sulfate 325 mg p.o. b.i.d.    · 4/2/18 - Stool heme negative x3.   · 4/30/18 - Patient had not started Ferrous sulfate 325 mg p.o. b.i.d. and verbalized understanding to do so and to notify the office if side effects are not tolerable.   · 5/24/18 - Patient was hospitalized at [facility] between 5/24/18 and 5/26/18 with dark tarry stool. INR was subtherapeutic. She was given IV Protonix and Protonix 40 mg daily. She underwent an EGD by David Rogers M.D. revealing small hiatal hernia, small submucosal nodule near the cardia, erosive gastritis. Pathology on gastric antrum and body biopsy revealed iron pill gastritis and no evidence of Helicobacter pylori. She then underwent a colonoscopy revealing diverticulosis, hemorrhoids and surgical changes from previous resection. It was recommended to consider outpatient M2A if hemoglobin continued to drop.   · 6/4/18 - Order written to discontinue iron pills and to use Injectafer 750 mg IV days 1 and 8 for low iron sats. Order written for Procrit 40,000 units subq weekly. Iron 65 (), TIBC 328 (228-428), iron saturation 20% (15-50), ferritin 123 ().   · 6/29/18 - Discussed patient’s intolerance of oral iron due to  gastritis. Oral iron discontinued with plans for Injectafer should iron level drop in the future.   · 11/7/18 - Patient claims not to be taking Vitamin B12 injections at home. Asked to resume those.   · 12/3/18 - Patient reports she has not been taking her B12 injections for a couple of months. She needs some syringes and needles for that.   · 2/4/19 - Patient was uncertain if she is currently taking ferrous Sulfate or not. Patient with history of intolerance and will follow hemoglobin.   · 5/8/19 - Ferritin 64 ().  · 8/27/2019- iron 52 (), iron saturation 14% (15-50), TIBC 364 (228-420), and ferritin 74 ().  Injectafer 750 mg IV day 1 and 8 due to oral iron intolerance ordered.  · 8/30/2019- Injectafer 750 mg IV day 1 given.  Hemoglobin 10.8.  At the end of the infusion heart rate was 48 and the patient reported mild dizziness.  Patient was sent to the ED for evaluation with symptoms resolving rapidly.  Chest x-ray and CT head were negative for acute findings.  · 9/6/2019-Injectafer 750 mg IV day 8 given without adverse effects.  Hemoglobin 9.8.   · 9/25/19 - Iron 85 (). Iron saturation 25 (15-50). TIBC 335 (228-428). Ferritin  694 ().  Hemoglobin 10.3.  · 10/25/2019 hemoglobin 9.7.  Patient started on Retacrit 40,000 units subcu weekly.  · 12/17/2020 hemoglobin 11.9, hematocrit 38.3     Past Medical History:   Diagnosis Date   • Anemia 2013   • Cervical disc disorder 2013    Herniated disc, pinched nerve   • Clotting disorder (HCC) 2013    Low platelets from chemo   • Colon polyp 2013   • Deep vein thrombosis (HCC) 2013   • Diverticulosis 2013   • GERD (gastroesophageal reflux disease) 2016   • HL (hearing loss) 2016    I need hearing aids   • Hyperlipidemia 2013    Need my cholesterol rechecked   • Hypertension    • Joint pain 2013    Shoulder pain, torn rotator cuff   • Low back pain 2013   • Neuromuscular disorder (HCC) 2015    Shingles, pinched nerves in my neck   • Osteopenia  2014   • Osteoporosis    • Ovarian cancer (HCC)     ovarian--  spread to lungs 10/2020   • Ovarian cancer on left (HCC) 1995   • Pneumonia 2010    Have had it several times   • Spinal stenosis 2013    Cervical   • Urinary tract infection 2013    Have had several utis       Past Surgical History:   Procedure Laterality Date   • BRONCHOSCOPY N/A 2/10/2021    Procedure: BRONCHOSCOPY with bronchioalveolar lavage;  Surgeon: Jerica Esparza MD;  Location: [facility] ENDOSCOPY;  Service: Pulmonary;  Laterality: N/A;  post op:right lobe pneumonia   • BRONCHOSCOPY N/A 6/15/2021    Procedure: BRONCHOSCOPY with bronchoalveolar lavage;  Surgeon: Jerica Esparza MD;  Location: [facility] ENDOSCOPY;  Service: Pulmonary;  Laterality: N/A;  lung cancer;pneumonia   • CATARACT EXTRACTION     • COLON SURGERY     • COLONOSCOPY  05/26/2018   • EXPLORATORY LAPAROTOMY     • HERNIA REPAIR     • HYSTERECTOMY     • OOPHORECTOMY           Current Outpatient Medications:   •  acetaminophen (TYLENOL) 500 MG tablet, Take 1,000 mg by mouth Every 6 (Six) Hours As Needed for Mild Pain ., Disp: , Rfl:   •  Calcium Carbonate 1500 (600 Ca) MG tablet, Take 600 mg by mouth 2 (Two) Times a Day., Disp: , Rfl:   •  calcium carbonate-vitamin d 600-400 MG-UNIT per tablet, Take 1 tablet by mouth 2 (Two) Times a Day., Disp: 60 tablet, Rfl: 3  •  cefdinir (OMNICEF) 300 MG capsule, Take 1 capsule by mouth 2 (Two) Times a Day., Disp: 5 capsule, Rfl: 0  •  cetirizine (zyrTEC) 10 MG tablet, Take 1 tablet by mouth Every Night., Disp: 30 tablet, Rfl: 0  •  Combivent Respimat  MCG/ACT inhaler, INHALE 1 PUFF FOUR TIMES DAILY AS NEEDED FOR SHORTNESS OF BREATH, Disp: , Rfl:   •  cyanocobalamin 1000 MCG/ML injection, Inject 1,000 mcg into the appropriate muscle as directed by prescriber Every 28 (Twenty-Eight) Days., Disp: , Rfl:   •  dexamethasone (DECADRON) 6 MG tablet, Take 1 tablet by mouth Daily With Breakfast., Disp: 5 tablet, Rfl: 0  •  famotidine (PEPCID) 40 MG tablet, Take  1 tablet by mouth Every Morning Before Breakfast., Disp: 90 tablet, Rfl: 1  •  folic acid (FOLVITE) 1 MG tablet, Take 1 tablet by mouth Daily., Disp: 30 tablet, Rfl: 1  •  HYDROcodone-homatropine (HYCODAN) 5-1.5 MG/5ML syrup, Take 5 mL by mouth Every 8 (Eight) Hours As Needed for Cough., Disp: 120 mL, Rfl: 0  •  letrozole (FEMARA) 2.5 MG tablet, Take 1 tablet by mouth Daily., Disp: 30 tablet, Rfl: 6  •  MAGnesium-Oxide 400 (241.3 Mg) MG tablet tablet, Take 400 mg by mouth 3 (Three) Times a Day., Disp: , Rfl:   •  Misc. Devices (Commode Bedside) misc, 1 Device Daily., Disp: 1 each, Rfl: 0  •  Misc. Devices (Roller Walker) misc, 1 Device Daily., Disp: 1 each, Rfl: 0  •  montelukast (SINGULAIR) 10 MG tablet, TAKE 1 TABLET BY MOUTH EVERY NIGHT, Disp: 30 tablet, Rfl: 2  •  oxyCODONE (ROXICODONE) 20 MG tablet, Take 1 tablet by mouth Every 4 (Four) Hours As Needed for Moderate Pain ., Disp: 180 tablet, Rfl: 0  •  oxyCODONE (ROXICODONE) 20 MG tablet, Take 1 tablet by mouth Every 4 (Four) Hours As Needed for Moderate Pain ., Disp: 180 tablet, Rfl: 0  •  phosphorus (K PHOS NEUTRAL) 155-852-130 MG tablet, Take 1 tablet by mouth 3 (Three) Times a Day., Disp: 90 tablet, Rfl: 3  •  potassium chloride (K-DUR,KLOR-CON) 20 MEQ CR tablet, Take 60 mEq by mouth Daily., Disp: , Rfl:   •  potassium chloride (K-DUR,KLOR-CON) 20 MEQ CR tablet, Take 40 mEq by mouth Every Night., Disp: , Rfl:   •  pregabalin (LYRICA) 100 MG capsule, Take 1 capsule by mouth 3 (Three) Times a Day., Disp: 15 capsule, Rfl: 0  •  sertraline (ZOLOFT) 50 MG tablet, Take 50 mg by mouth Every Night., Disp: , Rfl:   •  temazepam (RESTORIL) 30 MG capsule, Take 1 capsule by mouth At Night As Needed for Sleep., Disp: 30 capsule, Rfl: 1  •  triamterene-hydrochlorothiazide (DYAZIDE) 37.5-25 MG per capsule, Take 1 capsule by mouth Daily., Disp: 90 capsule, Rfl: 1  •  warfarin (COUMADIN) 2 MG tablet, DVT  Indications: Atrial Fibrillation (Patient taking differently: 5 mg.  DVT  Indications: Atrial Fibrillation), Disp: 30 tablet, Rfl: 0    Allergies   Allergen Reactions   • Morphine Nausea Only and Hives   • Penicillin V Potassium Rash   • Sulfa Antibiotics Rash   • Levaquin [Levofloxacin] Nausea Only and Dizziness     When taken with Coumadin   • Amoxicillin Rash       Family History   Problem Relation Age of Onset   • Hypertension Mother    • Cancer Father    • Hypertension Father    • Hypertension Sister    • Hypertension Brother    • Stroke Brother        Cancer-related family history includes Cancer in her father.    Social History     Tobacco Use   • Smoking status: Never Smoker   • Smokeless tobacco: Never Used   Vaping Use   • Vaping Use: Never used   Substance Use Topics   • Alcohol use: No     Comment: caffeine 32-64oz qd   • Drug use: No     HPI, ROS and PFSH have been reviewed and confirmed on 10/7/2021.     SUBJECTIVE:    Patient is accompanied today by her daughter for this appointment. She does not have any specific complaints.    REVIEW OF SYSTEMS:    Review of Systems   Constitutional: Negative for chills and fever.   HENT: Negative for ear pain, mouth sores, nosebleeds and sore throat.    Eyes: Negative for photophobia and visual disturbance.   Respiratory: Negative for wheezing and stridor.    Cardiovascular: Negative for chest pain and palpitations.   Gastrointestinal: Negative for abdominal pain, diarrhea, nausea and vomiting.   Endocrine: Negative for cold intolerance and heat intolerance.   Genitourinary: Negative for dysuria and hematuria.   Musculoskeletal: Negative for joint swelling and neck stiffness.   Skin: Negative for color change and rash.   Neurological: Negative for seizures and syncope.   Hematological: Negative for adenopathy.        No obvious bleeding   Psychiatric/Behavioral: Negative for agitation, confusion and hallucinations.     OBJECTIVE:  Vitals:    10/07/21 1421   BP: 103/67   Pulse: 77   Resp: 18   Temp: 97.3 °F (36.3 °C)   TempSrc:  "Infrared   SpO2: 94%   Weight: 71.2 kg (157 lb)   Height: 165.1 cm (65\")     Temp 97.5   Pulse 64  RR 18  /92  Height 165.1 cm   Weight 81.6 kg   BSA 1.89  BMI 30    ECOG  (1) Restricted in physically strenuous activity, ambulatory and able to do work of light nature    Physical Exam   Constitutional: She is oriented to person, place, and time. No distress.   Obese    HENT:   Head: Normocephalic and atraumatic.   Mouth/Throat: Mucous membranes are moist.   Eyes: Conjunctivae are normal. Right eye exhibits no discharge. Left eye exhibits no discharge. No scleral icterus.   Neck: No thyromegaly present.   Cardiovascular: Normal rate, regular rhythm and normal heart sounds. Exam reveals no gallop and no friction rub.   Pulmonary/Chest: Effort normal. No stridor. No respiratory distress. She has no wheezes.   Left chest wall port    Abdominal: Soft. Bowel sounds are normal. She exhibits no mass. There is no abdominal tenderness. There is no rebound and no guarding.   Musculoskeletal: Normal range of motion. No tenderness.   Lymphadenopathy:     She has no cervical adenopathy.   Neurological: She is alert and oriented to person, place, and time. She exhibits normal muscle tone.   Skin: Skin is warm and dry. She is not diaphoretic. No erythema.   Psychiatric: Her behavior is normal. Mood normal.   Nursing note and vitals reviewed.    I have reexamined the patient and the results are consistent with the previously documented exam. Cindy Ball MD     RECENT LABS    WBC   Date Value Ref Range Status   10/07/2021 9.02 3.40 - 10.80 10*3/mm3 Final   07/05/2020 4.04 (L) 4.5 - 11.0 10*3/uL Final     RBC   Date Value Ref Range Status   10/07/2021 3.74 (L) 3.77 - 5.28 10*6/mm3 Final   07/05/2020 3.68 (L) 4.0 - 5.2 10*6/uL Final     Hemoglobin   Date Value Ref Range Status   10/07/2021 10.2 (L) 12.0 - 15.9 g/dL Final   07/05/2020 11.5 (L) 12.0 - 16.0 g/dL Final     Hematocrit   Date Value Ref Range Status "   10/07/2021 33.8 (L) 34.0 - 46.6 % Final   07/05/2020 35.3 (L) 36.0 - 46.0 % Final     MCV   Date Value Ref Range Status   10/07/2021 90.4 79.0 - 97.0 fL Final   07/05/2020 95.9 80.0 - 100.0 fL Final     MCH   Date Value Ref Range Status   10/07/2021 27.3 26.6 - 33.0 pg Final   07/05/2020 31.3 26.0 - 34.0 pg Final     MCHC   Date Value Ref Range Status   10/07/2021 30.2 (L) 31.5 - 35.7 g/dL Final   07/05/2020 32.6 31.0 - 37.0 g/dL Final     RDW   Date Value Ref Range Status   10/07/2021 19.2 (H) 12.3 - 15.4 % Final   07/05/2020 15.9 12.0 - 16.8 % Final     RDW-SD   Date Value Ref Range Status   10/07/2021 62.1 (H) 37.0 - 54.0 fl Final     MPV   Date Value Ref Range Status   10/07/2021 10.7 6.0 - 12.0 fL Final   07/05/2020 10.2 6.7 - 10.8 fL Final     Platelets   Date Value Ref Range Status   10/07/2021 283 140 - 450 10*3/mm3 Final   07/05/2020 158 140 - 440 10*3/uL Final     Neutrophil Rel %   Date Value Ref Range Status   07/05/2020 54.0 45 - 80 % Final     Neutrophil %   Date Value Ref Range Status   10/07/2021 79.1 (H) 42.7 - 76.0 % Final     Lymphocyte Rel %   Date Value Ref Range Status   07/05/2020 30.4 15 - 50 % Final     Lymphocyte %   Date Value Ref Range Status   10/07/2021 9.1 (L) 19.6 - 45.3 % Final     Monocyte Rel %   Date Value Ref Range Status   07/05/2020 12.4 0 - 15 % Final     Monocyte %   Date Value Ref Range Status   10/07/2021 11.2 5.0 - 12.0 % Final     Eosinophil %   Date Value Ref Range Status   10/07/2021 0.4 0.3 - 6.2 % Final   07/05/2020 3.0 0 - 7 % Final     Basophil Rel %   Date Value Ref Range Status   07/05/2020 0.2 0 - 2 % Final     Basophil %   Date Value Ref Range Status   10/07/2021 0.2 0.0 - 1.5 % Final     Immature Grans %   Date Value Ref Range Status   07/05/2020 0.0 0 % Final     Neutrophils Absolute   Date Value Ref Range Status   07/05/2020 2.18 2.0 - 8.8 10*3/uL Final     Neutrophils, Absolute   Date Value Ref Range Status   10/07/2021 7.13 (H) 1.70 - 7.00 10*3/mm3 Final      Lymphocytes Absolute   Date Value Ref Range Status   07/05/2020 1.23 0.7 - 5.5 10*3/uL Final     Lymphocytes, Absolute   Date Value Ref Range Status   10/07/2021 0.82 0.70 - 3.10 10*3/mm3 Final     Monocytes Absolute   Date Value Ref Range Status   07/05/2020 0.50 0.0 - 1.7 10*3/uL Final     Monocytes, Absolute   Date Value Ref Range Status   10/07/2021 1.01 (H) 0.10 - 0.90 10*3/mm3 Final     Eosinophils Absolute   Date Value Ref Range Status   07/05/2020 0.12 0.0 - 0.8 10*3/uL Final     Eosinophils, Absolute   Date Value Ref Range Status   10/07/2021 0.04 0.00 - 0.40 10*3/mm3 Final     Basophils Absolute   Date Value Ref Range Status   07/05/2020 0.01 0.0 - 0.2 10*3/uL Final     Basophils, Absolute   Date Value Ref Range Status   10/07/2021 0.02 0.00 - 0.20 10*3/mm3 Final     Immature Grans, Absolute   Date Value Ref Range Status   07/05/2020 0.00 <1 10*3/uL Final     nRBC   Date Value Ref Range Status   10/05/2021 0.1 0.0 - 0.2 /100 WBC Final       Lab Results   Component Value Date    GLUCOSE 67 10/05/2021    BUN 17 10/05/2021    CREATININE 1.00 10/05/2021    EGFRIFNONA 55 (L) 10/05/2021    EGFRIFAFRI >60 06/07/2019    BCR 17.0 10/05/2021    K 5.6 (H) 10/05/2021    CO2 24.0 10/05/2021    CALCIUM 8.0 (L) 10/05/2021    ALBUMIN 3.20 (L) 10/05/2021    LABIL2 1.3 07/03/2020    AST 14 10/05/2021    ALT 6 10/05/2021     ASSESSMENT:    1. 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  She was on carboplatinum, Doxil.  Patient had had carboplatinum Taxol, carbo Taxotere, Gemzar single agent, niraparib and was on Doxil and carboplatin.  Doxil was discontinued due to approaching of lifetime dosing.  Progressed on single agent topotecan ,Alimta and combination chemotherapy with Gemzar plus cis-platinum.  Patient also progressed on oral etoposide.  Refer to paradigm testing for future options at time of progression.  Patient has had progression of disease and therefore  platinum was discontinued. On exam today I felt a right lower abdominal mass which is new.  She has CT scan of the chest, abdomen and pelvis on 3/1/2021 this showed moderate size pericardial effusion, scattered pulmonary nodules with septal thickening worse in the right lung compared to the left.  Pathologically enlarged hilar and mediastinal lymph nodes were suspicious for metastatic disease.  CT of the abdomen and pelvis showed increase in size of multiple soft tissue masses involving the anterior abdominal wall likely related to worsening of metastatic disease.  Increase in size of multiple sclerotic lesions within the lumbar spine and pelvis.  A 2D echocardiogram which did not reveal any significant pericardial effusion on 3/4/2021.  Recent CT scan of the chest suggested patient may have lymphangitic spread of malignancy.  Patient developed more pulmonary symptoms while on single agent oral etoposide.  Therefore this chemotherapy has been discontinued.  She was on single agent vinorelbine but now wants to hold off on chemo due to her recent infection with COVID-19 and hospitalization.  Patient is open to trying endocrine therapy: Letrozole has been recommended.  This is her preference at this time. We will continue letrozole through the course of her radiation treatment to the whole brain.  2. Reviewed the literature and other potential treatment options for her will include single agent vinorelbine, Xeloda, Cytoxan, oxaliplatin and aromatase inhibitors including letrozole and Faslodex.  If she has a high tumor mutational burden or PD-L1 expression, immunotherapy could also be an option. Also will proceed with biopsying of the anterior abdominal mass to send tissue for foundation 1 testing.  Reviewed results of foundation 1 testing, no actionable mutations.  PD-L1 was ordered but tissue not enough for testing.  3. Patient has a subcutaneous nodule noted on the anterior abdominal wall consistent with metastatic  disease currently measures 3.5 cm.  The anterior abdominal wall nodule has not significantly changed.  We will continue to monitor as the index cutaneous lesion.  4. Patient has bone metastases therefore I recommended Xgeva 120 mg subcu monthly.  Reviewed the side effects, benefits in detail with patient.  She would also be initiated on Os-Johnny D 600 mg twice a day.  She will continue Xgeva  5. Progressive brain metastasis: Status post stereotactic brain radiation under the care of Dr. Delong and recent MRI of the brain on 12/15/2020 shows probable progression.  Recent brain MRI did not show any obvious signs of progression.  6. B12 deficiency on parenteral B12 injections  7. History of DVT on anticoagulation therapy with warfarin:   8. Pancytopenia: Due to chemotherapy: On Procrit  9. Hypomagnesemia: Continue to monitor levels and infuse as needed  10. Iron deficiency anemia: Monitoring  11. Post hospital visit  12. Assessment has been reviewed and updated as documented above    Discussion:    Begin letrozole 2.5 mg daily reviewed.  Reviewed side effects    Side effects of aromatase inhibitors include risk of musculoskeletal side effect including arthralgia, myalgia, especially in patients who have underlying musculoskeletal disease such as osteoarthritis, hot flashes, mood changes. There is risk of vaginal dryness, dyspareunia and other sexual dysfunction. Other side effects include degree of cognitive symptoms compared to women not on endocrine therapy. There is possibility of fatigue, forgetfulness, poor sleep hygiene, osteoporosis, osteopenia, risk of fractures, cardiovascular disease and hypercholestolemia.         PLANS:    1. Foundation 1 testing reviewed. No actionable mutation. PD-L1 not done due to inadequate tissue specimen  2. Received with whole brain radiation and cervical spine radiation  3. Continue letrozole 2.5 mg p.o. daily  4. Staging CT scans of the chest, abdomen and pelvis  5. Continue calcium  with vitamin D  6. Repeat INR in 1 week  7. Increase phosphorus supplements to 3 times a day  8. Follow-up with me in 2 weeks  9. Discussed hospice patient not ready for hospice yet 10/7/2021  10. Continue supportive care  11. Continue Xgeva 120 mg subcu monthly  12. Continue folic acid 1 mg p.o. daily -reviewed needs to stay on drug to prevent side effects from Alimta   13. Continue B12 injections 1000 mcg IM monthly, next due 1/14/2021  14. Status post IV Injectafer  15. Continue magnesium oxide 400 mg three times a day and weekly IV replacement as needed.   16. Continue Retacrit 40,000 units subcu weekly for hemoglobin less than 10, for chemotherapy-induced anemia  17. Continue supportive medications  18. All questions answered     I have reviewed labs results, imaging, vitals, and medications with the patient today.   Lab Results   Component Value Date    WBC 9.02 10/07/2021    HGB 10.2 (L) 10/07/2021    HCT 33.8 (L) 10/07/2021    MCV 90.4 10/07/2021     10/07/2021     Patient verbalized understanding and is in agreement of the above plans.        I spent 40 total minutes, face-to-face, caring for Tahira today.  90% of this time involved counseling and/or coordination of care as documented within this note.

## 2021-10-08 NOTE — TELEPHONE ENCOUNTER
Attempted to call pt to let her know that Dr. Ball would like to repeat her CMP today. No answer or identifying voicemail. Callback number left.

## 2021-10-13 NOTE — TELEPHONE ENCOUNTER
Radiation Oncology Nurse Note    Attempted to locate patient as treatment team has been unable to reach patient to confirm appointment today for XRT start.    Called [facility], [facility], [facility], [facility], and [facility]. Patient is not under the care of any of the facilities listed.    Mallory Nuñez RN, BSN  Radiation Oncology Department  [facility]  [phone]  Office  [phone]  Fax

## 2021-10-20 NOTE — TELEPHONE ENCOUNTER
Pt needs INR checked today. Attempted to call with no answer. Pt is scheduled today for appt in radiation oncology at 1215, office notified.

## 2021-10-21 NOTE — TELEPHONE ENCOUNTER
From: Tahira Zimmerman  To: Fito Ram MD  Sent: 10/21/2021 2:01 PM EDT  Subject: UDS    I saw the results of my urine drug screen and it showed positive for all kinds of medicines I don’t take, why is that?

## 2021-10-21 NOTE — TELEPHONE ENCOUNTER
Attempted to call pt DaughterPati regarding the need for INR check and to get an update on the patient.

## 2021-10-25 NOTE — PROGRESS NOTES
Hematology/Oncology Outpatient Follow Up    PATIENT NAME:Tahira Zimmerman  :1955  MRN: [id number]  PRIMARY CARE PHYSICIAN: Noemy Queen MD  REFERRING PHYSICIAN: Noemy Queen MD    Chief Complaint   Patient presents with   • Follow-up     Malignant neoplasm of right ovary      HISTORY OF PRESENT ILLNESS:     1. Recurrent ovarian cancer diagnosis established in 2013.    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  The patient was treated with surgery and then postoperatively with adjuvant chemotherapy consisting of carboplatin and Taxol.  Six months later, she had recurrence of disease intra-abdominally between the rectum and vagina, which was treated with intraabdominal peritoneal chemotherapy.  She had been free of disease since that time.  She reported feeling a mass in the left side of her abdomen approximately six months ago.  This gradually grew and in early 2013 she had her daughter feel the mass.  The daughter works for Dr. David Rogers and the patient at that time also complained of intermittent rectal bleeding.  Consultation with Dr. David Rogers was obtained.  The patient had a CT scan of the abdomen and pelvis performed on 13 revealing left lower quadrant mass measuring 9.7 x 9.3 x 6 cm demonstrating areas of dense calcification, soft tissue density and cystic density within it.  Stromal tumor is felt to be most likely a possibly fibroma.  It is generated with necrosis and calcification.  Possible palpable mass in the rectus muscle is seen with calcification and deformity of the rectus muscle and just below this a second mass intra-abdominally was seen measuring 2 cm in the greatest diameter suspicious for second intraabdominal tumor.  The mass separate from the largest mass measuring 2.6 cm in the dependent portion of pelvis is seen on the right of the midline.  No  retroperitoneal lymphadenopathy was seen.  No free air, free fluid or other abnormality was present.  The patient was scheduled for an outpatient colonoscopy, but had syncopal episode while sitting in the toilet the night before and was brought to the emergency room early in the morning by her daughter and son-in-law.  The patient had apparently stopped breathing for a few seconds and did not have a pulse, but started breathing before CPR could be initiated.  In the emergency room, the patient had an EKG revealing sinus rhythm nonspecific T-wave flattening; metabolic panel revealed a glucose of 148, creatinine of 1.3, CBC was normal.  She was treated with intravenous fluid.  The patient’s case was then discussed with Dr. Anabell Monique and patient was subsequently admitted to [facility].  The patient underwent a colonoscopy by Dr. David Rogers on 06/27/13 revealing sigmoid diverticulosis and grade II internal and external hemorrhoids, but no mass or colitis was seen.  CT-guided biopsy of the mass was performed on 06/27/13 as well revealing metastatic papillary adenocarcinoma with numerous psammoma bodies.  Pathology comment stated prior records were not available; however, with history of ovarian cancer the current biopsy would be consistent with metastases from that malignancy.  Oncology consult was obtained and I initially saw the patient on 06/27/2013 where the patient had claimed to have little bit of pain in the area of disease.  She reported being frequently constipated, denies any weight loss or fevers.  She did report having some night sweats, but thought that was because of her menopause.  On 06/27/13 metastatic workup including labs and CT scans were initiated.  CT scan of the chest on 06/27/13 revealed no acute disease in the chest.  A 1.4 cm size focal area of decreased density was noted in the lateral aspect of the right lobe of the liver consistent with a benign cyst or hemangioma.   There was a very small hiatal hernia measuring 2.2 cm in diameter.  A CT scan of the head performed 06/27/13 revealed no acute intracranial abnormality noted.  CA-125 was 40 units/ml (0-35), CA 19-9 was 11.8 units/ml (0-35), CEA level was 0.8 ng/ml (0-3), TSH level was 1.09 IU/ml (0.34-5.6).  The patient was in workup for the syncopal episode, a carotid Doppler was performed on June 28 revealing an essentially normal study showing a reduction in diameter from 0-15% bilaterally.  A Holter monitor was completed on 06/27/13, which was unremarkable except for a few premature atrial contractions.  The patient was felt stable and subsequently was discharged home on 06/28/13.  Post discharge, the patient was seen at [facility] on 07/05/13 by Dr. Kathryn Thrasher who discussed treatment options with the patient including surgery.  The patient is in the office today 07/16/13 in follow up post hospitalization.  She is reporting that surgery is planned for July 30th of this month at .  · 7/30/13 to 8/3/13 - The patient was in [facility].  The patient was admitted for surgery for her recurrent ovarian cancer.  The patient had exploratory laparotomy, omentectomy, bowel resection, liver biopsy and appendectomy.  Pathology revealed of the omentum metastatic serous carcinoma of the transverse colon, segmental resection showed metastatic serous carcinoma involving mesenteric fat.  The liver wedge resection showed fibrosclerotic thickening of the hepatic capsule.  Benign liver parenchyma.  No evidence of metastatic tumor.  Appendix showed fibrous obliteration of the lumen.  Negative for metastatic carcinoma.  Rectum and sigmoid colon segmental resection showed metastatic serous carcinoma invading the colorectal mucosa uninvolved by tumor.  Vaginal mass showed metastatic serous carcinoma.  Margins are positive for tumor.  · 9/24/13 - Chemotherapy orders were written for patient to start  carboplatin 550 mg IV day one and Taxol 330 mg IV day one to be cycled every three weeks.  · 10/10/13 - The patient started cycle #1 of chemotherapy consisting of Carboplatin and Taxol.  · 09/25/13 -  is 10.6 normal.  · 10/01/13 - CT of the chest, abdomen and pelvis revealed new reticular nodular occupancy in the anterior segment of the right upper lobe, could reflect an inflammation or infectious etiology or could reflect unusual persistence of metastatic tumor.  New liver lesions, the smaller one appears to be implant on the surface of the liver, the larger may also represent an implant including the intrahepatic.  Several small mesenteric nodes seen throughout the abdomen and pelvis.  · 10/02/13 - Port placed by Dr. Sen.  · 10/24/13 - Orders written to start Neupogen daily and if more than three Neupogen are needed to give Neulasta after next chemo.  ANC is 0.15.  · 10/31/14 - Patient given cycle 2 of chemotherapy with Taxol and Carboplatin.  · 11/21/13 - The patient started cycle 3 of chemotherapy consisting of Carboplatin and Taxol.  · 11/26/13 - CT scan of chest, abdomen and pelvis revealed no evidence of active disease in the chest.  Reticulonodular opacity has resolved.  Metastatic lesion impressing upon the hepatic dome similar to prior exam.  No evidence of a change.  No evidence of new disease.  Postsurgical changes in the pelvis and throughout the retroperitoneum in the large bowel.  Finding containing anterior abdominal wall hernia containing fat tissue and enhancing vessels, 3 cm in diameter.  · 12/2/13 - Orders written to start Neupogen per protocol as well as Aranesp due to low hemoglobin and ANC.  ANC is 0.14.  Hemoglobin is 9.7.  · 12/11/13 - Orders written to hold chemotherapy until next week and decrease dose by 20% due to low platelet count.  Platelet count is 85,000.  · 12/16/13 - The patient received cycle 4 of Carboplatin and Taxol at a 20% dose reduction so Taxol dose is 260 mg and  Carboplatin is 440 mg.  · 12/16/13 - CA-125 12.6 (N).  · 12/19/13 - PET/CT scan.  Impression:  1. There is no evidence of hypermetabolism in the liver.  Specifically of the dominant lesion in or adjacent to the right hepatic dome that was seen and described on the recent CT study of 11/26/2013.  It is photopenic relative to the surrounding liver.  2.  There is no evidence of hypermetabolic soft issue mass lesion or free fluid in the abdomen or pelvis.  3.  The tonsillar tissues are slightly enlarged and have moderate activity level.  This maybe physiologic.  Correlation with oral cavity, examination is recommended.  4.  Mild amount of activity in the right level II jugular lymph chain without focally enlarged lymph nodes is of questionable significance.  · 1/6/13 - The patient received cycle 5 of chemotherapy with Taxol and Carboplatin.  · 1/27/14 - The patient started on cycle 6 of Taxol and Carboplatin.  · 2/18/14 - CT of the abdomen and pelvis with contrast; stable lesions impressing upon the hepatic dome, unchanged compared to the prior exam.  Multiple anterior abdominal wall hernias re-demonstrated.  Those above the umbilicus contain omental fat.  Below and to the left of the umbilicus as anterior abdominal hernia containing omental fat in nondilated small bowel which is larger than seen previously.  · 2/20/14 - The patient received cycle 7 of chemotherapy with Taxol and Carboplatin.   · 3/11/14 - Order written to discontinue chemotherapy due to patient has completed 7 cycles of chemotherapy and CT scans suggest possible remission.  · 3/11/14 -  11.0 (N).  · 06/05/14 - CT scan of the chest abdomen and pelvis with contrast: There are multiple anterior abdominal wall hernias.  There are 2 larger anterior abdominal wall hernias at the level of the transverse colon, which contain omental fat.  There are at least 2 small anterior abdominal wall hernias that contain omental fat.  There is a moderate sized  anterior abdominal wall hernia at the level of the umbilicus, eccentric to the left, which contain non-dilated bowel.  Interval decrease in the size of two deposit impressing on the liver.  The third tiny deposit has been stable.  · 8/19/14 -  10.4 (N).  · 12/19/14 -   10.0 (N)  · 1/7/15 - CT chest, abdomen and pelvis with stable appearance of the chest with several micronodules. Small hiatal hernia, slightly larger than previous exam, increased from 1.5 to 2.5 cm in diameter. Bochdalek hernia unchanged. Multiple hepatic lesions. The two dominant lesions at the right hepatic dome had decreased in size from previous. The remaining tiny nodules measured 3-5 mm in diameter and were unchanged. There was no evidence of new intra-abdominal or pelvic mass or free fluid. There were multiple anterior abdominal wall hernias which had increased in size.   · 7/27/15 - Comprehensive metabolic panel with no significant abnormalities. CA-125 of 21 (0-35).   · 10/26/15 - WBC 6, hemoglobin 13, platelet count 204,000. Heart rate 47. CA-125 of 20 (0-35).    · 2/18/16 - CT chest with contrast with stable micronodular density seen in the lungs without significant change from prior exam. CT abdomen and pelvis with regression of liver lesions with no new evidence of metastasis. Multiple ventral hernias again noted without significant change from prior exam. Creatinine 0.9 (0.6-1.3).    · 2/25/16 - Patient hospitalized at [facility] between 2/25/16 and 2/27/16 with pyelonephritis secondary to E-coli. She was given two days of IV Rocephin and then placed on oral Levaquin. She was asked to hold her Coumadin, but then had nausea and vomiting because of Levaquin and stopped the Levaquin also. Her CA-125 was 32 (0-35) and CEA 1.3 (0-5). Her urine grew >100,000 E-coli. CT of the abdomen and pelvis was done which revealed 4.1 x 1.8 cm sized mild ovoid area of decreased density in the liver parenchyma along the anteromedial  aspect of the dome of the right lobe of the liver, unchanged significantly since the previous study of 2/18/16. There was suspicion of a very small pericardial effusion. There was suspicion of a small hiatal hernia. There were several hernias in the anterior abdominal wall. A 3.5 x 3.1 cm incised well-circumscribed abnormal density in the left retroperitoneal fatty soft tissue at the level of the left iliac crest was suggestive of tumor recurrence. There was an abnormal density in the left retroperitoneal soft tissues in the left flank that partially surrounded the left kidney and extended downward to the left pelvic area. Diverticulosis of the distal descending colon and sigmoid colon were seen.     · 3/8/16 - Patient prescribed Bactrim DS 1 p.o. b.i.d. for 10 days for pyelonephritis, which was partially treated with Rocephin and Levaquin. Urine with 40,000 E-coli treated with Macrobid for 7 days.  of 20 (0-35).    · 3/31/16 - PET scan with area of low density anteriorly in the right lobe of the liver with no significant radiopharmaceutical uptake to suggest malignancy. Multiple round soft tissue density masses showing increased radiopharmaceutical activity and multiple anterior abdominal wall hernias.   · 5/10/16 - Patient complains of venous enlargement of the left chest wall around the port. Has not been seen by  yet, but has an appointment on 5/23/16. Complained of a cough.   · 5/12/16 - Infusaport contrast study with no evidence of fibrin sheath. Chest x-ray with mild cardiac prominence.   · 5/23/16 - Patient seen in consultation by Sheng Bowman M.D. at [facility] and a chemotherapy trial recommended.   · 6/1/16 -   of 27.1 (0-35).    · 6/14/16 - WBC 5.71, hemoglobin 12.4, platelet count 188,000. Patient is scheduled for surgery at  on 6/16/16 after further discussion with  physicians. Discussed adjuvant chemotherapy.   · 6/16/16 - Excisional biopsy of abdominal wall mass  performed by Sheng Bowman M.D. at [facility] with pathology revealing metastatic high-grade papillary serous carcinoma.   · 7/7/16 - Received a call from the patient that Tramadol was not working and that she was given Norco #24 after her biopsy at  which did help her pain. Patient prescribed Norco 5/325 one p.o. q. 4-6 hours p.r.n. #60 with no refills. Echocardiogram with left ventricular inferior wall hypokinesis with ejection fraction of 50-55%.   · 7/8/16 - Patient seen in consultation by Sheng Bowman M.D. in consultation at  for metastatic high-grade papillary serous carcinoma diagnosed by excisional biopsy on 6/16/16 with carcinomatosis and patient not a surgical resection candidate. Genetic sequencing and susceptibility testing of tumor was ordered. Biopsy was done of anterior abdominal wall.   · 7/7/16 - Echocardiogram with left atrial enlargement. Mild mitral regurgitation. Left ventricular proximal inferior wall hypokinesis with ejection fraction of 50-55%.   · 7/11/16 - While waiting for genomics results, in order not to delay treatment further, order written for Taxotere 60 mg/M2 IV over one hour followed by Carboplatin AUC 5 over one hour, cycles to be repeated every three weeks.  Comprehensive metabolic panel normal.  26 (0-35).    · 725/16 - Pain contract signed for use of Norco.   · 8/5/16 - Patient stated that she experienced a red rash and was itchy post taking Norco. Norco discontinued and Tramadol refilled.   · 8/16/16 - Patient started cycle 1 chemotherapy. WBC 7.1, hemoglobin 11.2, MCV 88.7, platelet count 94,000. Patient complained of nausea for a week post chemo. Already getting Emend, Aloxi and Decadron. Added Omeprazole 40 mg daily orally.   · 8/17/16 - Urine culture with >100,000 E-coli.   · 8/25/16 - Cycle 2 chemotherapy given.   · 9/9/16 - Magnesium 1.3, replaced intravenously. Potassium 3.4 (3.6-5.1), increased oral potassium supplementation.    · 9/16/16 -  Cycle 3 chemotherapy given.   · 9/19/16 - Comprehensive metabolic panel with no significant abnormality.   · 9/20/16 - CT abdomen and pelvis with evidence of progressive metastatic disease in the abdomen and pelvis with interval enlargement of several soft tissue attenuations and subcutaneous nodules in the anterior abdominal wall. Interval enlargement of a left pelvic soft tissue attenuation mass. A lentiform area of low attenuation in the dome of the liver stable in size. Multiple anterior abdominal wall hernias containing fat and/or bowel. Marked pelvic floor laxity. CT chest negative for evidence of metastatic disease. Tiny pulmonary nodules in the right lung stable since 2013 consistent with a benign etiology.   · 9/23/16 - Macrobid 100 mg p.o. b.i.d. x7 days prescribed for UTI. Current chemotherapy discontinued after discussion and new chemotherapy orders written. Carboplatin AUC-5 IV day 1 and Doxil (Liposomal Doxorubicin) 30 mg/M2 IV day 1 to cycle q. 21 days.  of 18 (0-35). Magnesium 1.6, replaced intravenously. Comprehensive metabolic panel with potassium 3.4 (3.6-5.1).     · 10/13/16 - Patient started on cycle 1 of Carboplatin and Liposomal Doxorubicin followed by Neulasta.   · 11/3/16 - Cycle 2 Carbo and Doxil given.   · 11/17/16 - Magnesium 1.0, replaced intravenously. Oral potassium supplement increased.   · 11/29/16 - CT chest with small non-calcified right pulmonary nodules unchanged. Benign bone island in the midthoracic vertebral body along with benign bony hemangiomas in the middle thoracic vertebral bodies. CT abdomen and pelvis with mild progressive metastatic disease from CT of September 2016. Anterior abdominal wall mass superiorly mildly increased in size with new area noted as described measuring 3 x 1.7 cm. 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 Stable area of decreased uptake in the dome of the liver not hypermetabolic on recent PET  scan, likely representing cyst or focal fatty infiltration. Stable small hiatal hernia, fat-containing Bochdalek’s hernia and anterior abdominal wall hernias with stable pelvic floor relaxation.    · 12/1/16 - Cycle 3 chemotherapy given.   · 12/8/16 - Current chemotherapy discontinued and patient started on Gemcitabine 1000 mg/M2 IV days 1, 8, 15 q. 28 days.   · 12/22/16 - Patient seen in followup by Juliana Roy M.D. at the pain clinic, treated with Oxycodone and Lyrica. Transaminases normal. Patient started cycle 1 chemotherapy.   · 12/29/16 - Magnesium 1.1 (1.8-2.5), replaced intravenously.   · 1/5/17 - Cycle 1 day 15 chemotherapy held as patient leaving for vacation to [place]. Chemotherapy dose decreased by 20%. Patient complains of diarrhea for which Imodium prescribed.   · 1/11/17 - BRCA-1 and BRCA-2 negative.   · 1/12/17 - Echocardiogram with ejection fraction 60% or greater.   · 1/19/17 - Comprehensive metabolic panel with no significant abnormality. Patient started cycle 2 chemotherapy.   · 2/2/17 - Urine with >100,000 E-coli treated with Cipro 500 mg p.o. b.i.d. x1 week.   · 2/6/17 - Magnesium 1.1 (1.8-2.5), replaced intravenously.    · 2/23/17 - Patient started cycle 3 chemotherapy.   · 2/27/17 - CT chest with interval development of too numerous to count very small pulmonary nodules, ill-defined ground glass opacities concerning for development of pulmonary metastatic disease. Stable left-sided chest port. CT abdomen and pelvis with stable appearance of multiple small soft tissue densities within the abdomen near midline subcutaneous fat of the abdominal wall. Interval decrease in size of largely calcified left pelvic mass and multiple fat in bowel containing small ventral hernias.   · 3/6/17 - Pulmonary consultation obtained for lung nodules. Discussed CT results with patient. Decision made to continue present chemotherapy until further lung evaluation as abdominal disease better.  of 18  (0-35).    · 3/16/17 - Patient started cycle 4 chemotherapy, but treatment held from day 8 onwards because of hospitalization.   · 3/19/17 - Patient was hospitalized at [facility] between 3/19/17 and 3/25/17 with chest pain. Chest x-ray had revealed increased mild bibasilar airspace disease. Troponin I and EKG’s were negative. INR was elevated. Patient was treated with antibiotics. EGD was performed revealing small hiatal hernia and esophageal dilatation was performed. Bronchoscopy was performed also with no intrabronchial lesions identified. IgA was 51 (), IgG 320 (600-1500) and IgM 19 (). Lexiscan nuclear stress test had no evidence of reversible myocardial ischemia with normal left ventricular ejection fraction of 58%. CT chest had development of patchy bilateral ground glass opacities most pronounced involving the left upper lobe and bilateral lower lobes. Multiple tiny bilateral pulmonary nodules were less conspicuous. The patient underwent a bronchoscopy by Jerica Esparza M.D. with right upper lobe bronchoalveolar lavage revealing benign squamous cells and bronchial cells with pulmonary macrophages present. There was mild acute inflammation. Negative for malignant cells. Prilosec was changed to Famotidine for hypomagnesemia.    · 4/6/17 - Magnesium 1.2 (1.8-2.5). Comprehensive metabolic panel with no significant abnormality. Patient seen in follow-up by Fito Ram M.D. at [facility]. Treated with Lyrica and Oxycodone.    · 5/4/17 - Patient complains of a rash itching on her right upper arm. Eczematous rash noted and patient prescribed Cyclocort 0.1% b.i.d. Magnesium infusions continued with each chemo. Order written to resume chemotherapy.   · 5/16/17 - Cycle 5 chemotherapy started.   · 5/26/17 - Magnesium 1.4 (1.8-2.5), replaced intravenously. Potassium 2.9 (3.6-5.1), KCL increased to 20 mEq three in the morning and three in the evenings.   · 5/30/17 - PET scan with remaining metabolic  activity within the nodular areas. The suggested mass which is partially calcified and necrotic within the left aspect of the pelvis seems slightly larger with increased metabolic activity. There was more calcification in these areas compared to prior study, suggesting inflammation.      · 6/8/17 - Patient complaining of shortness of breath. Does take HCTZ at home. Chest x-ray ordered and chemotherapy continued along with weekly magnesium replacement. Chest x-ray with no acute cardiopulmonary abnormalities.   · 6/13/17 - Patient received cycle 6 of chemotherapy.   · 7/31/17 - WBC 5.0, hemoglobin 11.2, MCV 89.7, platelet count 217,000. Patient is to resume chemotherapy starting with cycle 7 on Wednesday of this week.  of 19 (<35). Potassium 3.3 (3.5-5.3).     · 8/2/17 - Patient began cycle 7 of chemotherapy.   · 8/30/17 - Patient started cycle 8 chemotherapy.   · 9/5/17 - Patient seen in followup at Pain Management by Fito Ram M.D. and continued on treatment with Oxycodone and Lyrica.   · 9/13/17 - Patient was hospitalized at [facility] for a day with dizziness secondary to dehydration, hypokalemia and anemia. She was given 1 unit of packed red blood cells and electrolytes were replaced. Chest x-ray revealed a subtle opacity in the left lateral lung base favored to represent atelectasis. Magnesium 1.3 (1.5-2.5), patient continued on replacement.    · 9/22/17 - Chemotherapy and magnesium replacement continued with decrease in chemotherapy dose by 10% secondary to cytopenias.   · 9/25/17 - Cycle 9 chemotherapy started.   · 10/12/17 - CT of the chest with contrast showed several tiny pulmonary nodules. One within the right lower lobe appears new from prior exams. Two adjacent foci of nodularity within the medial right lower lobe suggestive of minor infectious or inflammatory process. CT of the abdomen and pelvis with contrast which showed soft tissue nodules along the anterior abdominal and pelvic walls  unchanged from previous scan. Also a partially calcified mass within the left pelvis unchanged. No acute process identified within the abdomen or pelvis. Several left paracentral ventral hernias unchanged. No evidence of acute bowel obstruction.   · 10/16/17 - Order written for Levaquin 500 mg p.o. daily as the patient states that she has had a productive cough, fever and chills and with the results of the CT scan showing a possible infectious versus inflammatory process. Order also written to continue chemotherapy and magnesium replacement as previously prescribed.   · 10/23/17 - Cycle 10 chemotherapy started.   · 11/19/17 - Patient went to the Emergency Room at [facility] complaining of right lower extremity redness and fever for a day. Venous Doppler had no evidence of a DVT and patient was discharged home on Clindamycin.   · 11/21/17 -  of 17 (0-35).   · 12/4/17 - Cycle 11 chemotherapy started.   · 12/20/17 - PET scan with multiple hypermetabolic soft tissue nodules abutting the anterior abdominal wall, stable from previous examination. Peripherally calcified left lower quadrant mass near the ascending colon stable in size demonstrating no hypermetabolic uptake. Previously had maximum SUV of 7. Right medial basilar pulmonary nodules resolved.   · 12/22/17 - CT left lower extremity with soft tissue swelling with skin thickening present along the anterior and medial aspect of the leg, slightly more pronounced around the palpable marker seen within the upper medial aspect of the lower extremity.   · 12/26/17 - Elastic stockings prescribed for lower extremity edema.   · 1/8/18 - Patient hospitalized at [facility] between 1/8/18 and 1/11/18 with fever of up to 101 degrees and painful rash on the lower extremities of several weeks’ duration. She was found to be hypokalemic and MRI of the lower extremities revealed thickening and swelling. Chest x-ray had no acute cardiopulmonary abnormality. Skin biopsy was performed by  Surgery revealing stasis dermatitis and no evidence of malignancy. Infectious Disease consultation was obtained and IV antibiotics were stopped. Blood cultures had no growth.   · 1/22/18 - Patient asked to start wearing elastic stockings which she has not started yet. She was given a prescription for Dyazide 1 p.o. daily.   · 2/26/18 - Ordered chemo to resume again. Patient unaware that she was supposed to resume chemo after her last visit in January. Continue magnesium infusions on day 1, 8 and 15. Prescribed Levaquin 500 mg p.o. daily x10 days for productive cough x1 week. History of viral illness consistent with influenza.  of 18 (0-35). Chest x-ray with no acute process.    · 3/5/18 - Cycle 12 chemotherapy started.   · 3/26/18 - Options discussed with patient. Decision made to discontinue IV Gemzar and orders written to start on Niraparib (Zejula) 300 mg p.o. daily as maintenance therapy.   · 4/11/18 - Niraparib discontinued due to insurance denial. Orders written to start Carboplatin-AUC 5 IV day 1 cycling every 21 days. Orders written for Neulasta 6 mg subcutaneous kit day 1 with palliative treatment intent and expected duration of treatment three months.   · 4/12/18 - CT chest, abdomen and pelvis with contrast revealed numerous tiny centrilobular nodules in the lungs favored to reflect infectious or inflammatory process. Nodules ranged 1-3 mm in size and are new from prior studies with metastatic disease not entirely excluded. Stable small hiatal hernia. Interval progression of metastatic disease involving the subcutaneous tissues at the ventral abdominal wall. Multiple soft tissue density nodules previously shown to be hypermetabolic on PET scan. New small crescent of low attenuation material along the periphery of the spleen with similar finding at the dome of the liver. Findings are concerning for peritoneal metastases. Density of the dome of the liver remained unchanged from multiple prior studies.  Stable calcified mass in the left pelvic sidewall. Urinary bladder wall thickening.   · 4/23/18 - Cycle 1 chemotherapy with Carboplatin administered along with Neulasta injection.   · 4/26/18 - Neulasta discontinued due to insurance denial.   · 5/14/18 - Patient received cycle 2 Carboplatin.   · 6/5/18 - Cycle 3 day 1 chemotherapy with Carboplatin administered.   · 6/20/18 - CT chest, abdomen and pelvis at [facility] showed re-demonstration of soft tissue mass in the ventral wall hernia left of the midline as well as the dome of the liver, likely representing metastatic disease similar as compared to the prior study. Additional calcified lesions present in the upper left hemipelvis and along the anterior abdominal wall to the right of the midline also likely representing metastatic disease. No definite new lesions were identified. Moderate to large stool burden likely related to mild constipation was present. No suspicious lung nodules were identified. The previously-described ground glass nodules appeared to have resolved. There was a stable subpleural nodule in the right upper lobe measuring 3 mm present since 2014 without significant changes.   · 6/26/18 - Cycle 4 day 1 Carboplatin administered with addition of Neulasta for febrile neutropenia prophylaxis.   · 6/29/18 - Reviewed CT scans with patient. Orders written to discontinue Carboplatin and Neulasta and plan to start Niraparib 300 mg by mouth daily as maintenance therapy. Prescribed Amlodipine 2.5 mg p.o. daily for chemo-induced hypertension.   · 7/18/18 - Orders written to increase weekly Magnesium Sulfate to 3 g IV weekly due to continued hypomagnesemia.   · 8/1/18 - Patient reports she has not received Niraparib (Zejula) to date.   · 8/30/18 - Patient’s phone was not working so though Niraparib approved, the drug company had not been able to get ahold of her. Patient supplied with drug company number so that she can start taking the pills.    · 9/6/18 -  of 20 (N).   · 9/18/18 - Urinalysis done for dysuria and back pain. Urine culture grew 20,000 to 50,000 colonies of urogenital ruy. Prescribed Bactrim DS one tablet twice daily for one week.   · September 2018 - Patient initiated on Zejula 300 mg p.o. daily.   · 10/1/18 - WBC 3.5, hemoglobin 12, platelet count 74,000. Niraparib (Zejula) dose held and then resumed when platelets above 100,000 at a dose of 200 mg daily.   · 10/15/18 - Patient received Niraparib (Zejula) at 200 mg p.o. daily with a platelet count of 198,000.   · 11/7/18 - WBC 6, hemoglobin 11.6, MCV 96.2, platelet count 137,000. Patient claims to have stopped taking Niraparib a few days prior because of cough. Asked to resume taking it. Ciprofloxacin 500 mg p.o. twice daily for one week for bronchitis.   · 12/3/18 - Magnesium Sulfate infusions changed to every two weeks, to send mag level before and give 3 grams. DC’d Magnesium Oxide and started Magnesium Plus Protein two pills twice daily.   · 1/4/19 - CT chest, abdomen and pelvis with new patchy nodular areas of airspace consolidation within the bilateral upper lobes, left greater than right, favored to be infectious or inflammatory. Interval enlargement of the peritoneal soft tissue masses within the pelvis compatible with progression of disease. Enlarging ventral hernia now containing multiple loops of small bowel and colon.   · 1/7/19 - Patient has not been coming in for weekly magnesium replacement as nursing has not been able to get ahold of her. Results of CT’s discussed with patient. Decision made to change her to IV chemotherapy with Carboplatin AUC-5 day 1 and pegylated liposomal Doxorubicin (Doxil) 30 mg/M2 IV day 1 to cycle every four weeks. Patient made aware of the limited choices of her treatment available.   · 1/31/19 - Echocardiogram showed LVEF 50-55% and otherwise normal echo Doppler study.   · 2/4/19 - New chemotherapy not initiated to date with difficulty  contacting patient for echocardiogram. Neulasta On-Pro 6 mg ordered with chemotherapy due to extensive prior exposure to chemotherapy, increasing risk of febrile neutropenia, history of neutropenic events on prior chemotherapy regimens.   · 2/15/19 - Patient started cycle 1 chemotherapy with Neulasta support.   · 3/6/19 -  of 28 (0-35).   · 3/15/19 - Cycle 2 Carboplatin with Liposomal Doxorubicin (Doxil) and Neulasta support initiated.     · 4/10/19 - Patient was requested to followup with Dr. Queen regarding hypotension and blood pressure medication dosing. Patient appears to be tolerating treatment well with cytopenias not requiring dose adjustments. No Doxil-related skin or mucus membrane toxicities or other significant toxicities to date.   · 4/12/19 - Cycle 3 chemotherapy given.   · 4/16/19 - CT chest, abdomen and pelvis with no evidence of active metastasis to the chest. Several tree-in-bud nodules within the periphery of the inferior right upper lobe likely infectious or inflammatory. Disease burden within abdomen and pelvis stable to slightly increased from prior CT. Specifically, a soft tissue mass along the right rectus muscle slightly increased in bulk. Multiple ventral hernias, some containing loops of bowel, with no evidence of acute bowel obstruction.   · 5/8/19 - Discussed CT results with patient. Overall stable disease and would like to continue same treatment. Dove soap and Hydrocortisone Cream p.r.n. prescribed for scattered rash on back.   · 5/10/19 - Cycle 4 Carboplatin and Liposomal Doxorubicin initiated.   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 It was ER positive, HER2/zuleima negative, PD-L1 negative. There was TOPO1 positivity and TUBB3 positivity. TMB was low (two mutations per megabyte MUTS/MB) and MSI was stable. There were 13 therapies considered with increased benefit. The 13 therapies with increased benefit  included Fulvestrant, Irinotecan, Letrozole, Topotecan, Anastrazole, Exemestane, Tamoxifen, all of which were referenced to [facility] as well as Abemaciclib, Everolimus, Gefitinib, Palbociclib, Ribociclib and Toremifene.     · 6/5/19 echocardiogram with preserved LV systolic function with EF around 60%.  · 6/7/19 cycle 5 chemotherapy with Neulasta support given.  Patient continued on mag oxide 400 mg p.o. twice daily and weekly IV 3 g mag sulfate infusions for persistent hypomagnesemia.  · 7/5/2019- cycle 6 chemotherapy with carboplatin and doxorubicin with Neulasta port initiated.  Ca125:  21 (0-35).  · 7/23/2019-CT chest abdomen and pelvis compared to CT from 4/16/2019 revealed multiple small pulmonary nodules unchanged from prior examination within the right upper and left lower lobes.  Complex ventral hernia similar to prior exam.  Soft tissue nodule along the right lateral margin of the right of the midline measuring 12 x 10 mm unchanged in size.  Irregular soft tissue nodular thickening along the margin of the most inferior aspect of the complex ventral hernia with thickness measuring up to 13 mm similar to prior examination.  Extensive calcified and soft tissue mass within the left lower quadrant decrease in size now measuring 2.6 x 2.3 cm previously 3.0 x 2.5 cm.  Partially calcified mass involving the right rectus sheath measuring 3.1 x 2.7 cm previously measured 3.3 x 2.9 cm.  Densely calcified mass measuring 2.1 x 1.6 cm unchanged previously measured 2 x 1.7 cm.  Right external iliac chain lymph node measuring 9 mm previously measured 5 mm.  · 8/2/2019 cycle 7 chemotherapy given.  · 8/30/2019-cycle 8 chemotherapy with carboplatin and doxorubicin with Neulasta support given.  · 9/27/2019 cycle 9 chemotherapy given.  · 10/1/19 - Echocardiogram showed Normal LV size and contractility EF of 60-65%. Normal RV size. Borderline left atrial enlargement. Aortic valve, mitral valve, tricuspid valve appears structurally  normal, no significant regurgitation seen.No pericardial effusion seen. Proximal aorta appears normal in size.  · 10/18/19 - Magnesium 1.5 (1.8-2.5).  IV magnesium replacement continued.  · 10/25/2019 cycle 10 chemotherapy given.  · 10/29/2019 patient had CT scan of the chest abdomen and pelvis-there is a new 2 mm nodule in the left lower lobe with a stable 2 mm nodule in the left lower lobe.  Follow-up in 6 months was recommended.  Stable multiple soft tissue density and calcified nodules within the ventral abdominal wall and left retroperitoneal fat consistent with nonviable metastatic disease.  These nodules have either decreased in size or stable.  · 11/22/2019 10 received cycle 11 of chemotherapy with Doxil and carboplatinum with Neulasta  · 12/8/2019 to 12/11/2019 patient was admitted to the hospital for neutropenic fever.  · 12/20/2019 patient received cycle 12 of chemotherapy with Doxil and carboplatinum with Neulasta  · 1/13/20: 2 D echo was normal with EF 56% to 60%  · 1/24/20-Patient received cycle 13 of combination chemotherapy with carboplatinum and Doxil  · 2/3/2020 patient had a  which was 25.4  · 2/21/2020-patient received cycle 14 of chemotherapy with carboplatinum and Doxil  · 3/6/2020 patient had CT scan of the chest, abdomen and pelvis this revealed a 3 mm nodule in the left lower lobe present on prior CT of the abdomen unchanged from July 2019.  Stable 3 mm right upper lobe nodule.  There is a lymph node in the AP window measuring 8 x 9 mm prominent left hilar lymph node measuring 12 mm previously was 7 x 7 mm no significant adenopathy was seen in the abdomen there is an area of irregular soft tissue in the left paramidline ventral hernia which is stable measuring 5.7 cm partially calcified mass in the right rectus measuring 3 x 2.5 cm which is also stable the prominent lymph nodes in the left mid hilum and mediastinum may be reactive or metastatic disease  · 4/17/2020-patient had cycle 15  of single agent carboplatinum  · 5/26/2020 patient had CT scan of the chest, abdomen and pelvis showed 3 new lung nodules measuring 7 mm in the left upper lobe, 5 mm in the left lower lobe and 4 mm in the right lower lobe.  There were additional small lung nodules which were unchanged.  Mildly prominent mediastinal and bilateral hilar lymph nodes mildly increased in size.  Right hilar node 9 mm previously was 5 mm.  Carinal node 9 mm previously was 6 mm.  The nodule at the right side of the hernia sac has increased to 1.5 cm from 1.2 cm.  Also a nodule in the subcutaneous fat currently measures 2.4 was previously 2 cm.  · 5/15/2020-patient received cycle 16 of carboplatinum  · Since the last visit in the office patient patient developed dizzy spell and fell. For that reason she was taken to the emergency room at [place].    · 7/3/2020 she had brain MRI which showed an 8 mm focus of abnormality in the left aspect of the posterior fossa corresponding to a dural based enhancing lesion thought to represent a calcified meningioma vessels calcified dural based metastasis.  This lesion has a slight local mass-effect and a small amount of surrounding edema.  There is also a 4 to 5 mm rounded focus of abnormal nodular enhancement along the cortex of the left parietal lobe but no significant mass-effect.  This is nonspecific could represent neoplastic process, infectious/inflammatory process or granulomatous disease.  · Patient was seen by neurosurgery, follow-up brain MRI in 8 weeks has been recommended by Dr. Hartman  · 7/9/2020 patient was initiated on single agent topotecan cycle 1 day 1  · 7/16/2020 she received the 8 topotecan  · 8/6/2020 patient received cycle 1 day 15 of topotecan  · 9/2/2020 patient had brain MRI multiple hospital which showed interval increase in size of the metastasis in the left parietal lobe now measuring 9 x 7 mm.  Previously was 5 mm.  There has been interval increase in size of the left superior  cerebellar metastases now measuring 1.3 cm previously was 8 mm.  There was no new metastatic disease identified.  · 9/11/2020 patient received cycle 2-day 15 of single agent topotecan  · Was admitted to the hospital 10/1/2020 for supratherapeutic INR  · 10/13/2020 patient was seen by Dr. Delong she has started stereotactic brain radiation to be completed on 10/28/2020  · 11/6/2020 patient received cycle 3-day 1 of chemotherapy with topotecan  · 11/13/2020 patient received cycle 3-day 8 of topotecan  · 11/30/2020 patient had CT scan of the chest, abdomen and pelvis this showed new reticular nodular interstitial thickening within the right lung mostly in the right middle lobe and right lower lobe worrisome for lymphangitic spread of cancer.  Evidence of progression of metastatic disease in the chest, abdomen and pelvis the new tiny sclerotic foci within the spine worrisome for osseous metastatic disease.  · 12/17/2020 - Discontinued topotecan due to progressive disease with orders written to begin Alimta 500 mg per metered squared IV q. 21 days  · 12/30/2020 - Started cycle 1 day 1 Alimta   · 1/18/2021 patient had bone scan which showed chest metastatic disease.  Xgeva has been ordered  · 3/12/2021 patient received cycle 1 of combination chemotherapy with cisplatinum and  Gemzar  · 4/23/21: Patient had cycle 2 day with cis-platinum and Gemzar  · 4/30/2021: Patient received cycle 2-day 8 of chemotherapy with cis-platinum and Gemzar  · 5/6/2021 patient had a CT scan of the chest which shows evidence of progressive disease  · 6/15/2021: Patient was admitted to the hospital for supratherapeutic INR, probable pneumonia.  She has since been discharged.  · 7/9/2021 patient received cycle 1 day 1 of single agent vinorelbine  · 7/16/2021 patient received cycle 1 day 8 of single agent Navelbine  · 8/9/2021 patient received cycle 2-day 1 of single agent vinorelbine  · August 21 patient was hospitalized for COVID-19 infection with  respiratory failure  · 10/5/2021 patient had brain MRI which showed evidence of progressive disease in the brain, osseous metastatic and multiple lesions in the calvarium, skull base and upper cervical spine. Abnormal signal within the posterior superior aspect of the clivus with marked cortical thickening at risk for impending pathologic fracture. Patient was seen by Dr. Delong who has offered her whole brain radiation and also including the cervical spine area in the field of radiation is being considered  · 10/20/2021: Patient has CT scans of the chest, abdomen and pelvis which basically showed evidence of progressive disease in the chest abdomen and pelvis as well as subcutaneous areas.     2. Deep vein thrombosis of left upper extremity diagnosis established in October of 2013.  · 10/16/13 - Venous Doppler of left upper extremity.  Impression:  Deep vein thrombosis involving the subclavian, axillary and brachial veins on the left side, superficial vein thrombosis involving the left basilic vein.  · 10/16/13 - Order written for Lovenox 120 mg subcutaneously daily until INR is in therapeutic range.  Order written for Coumadin 5 mg p.o. daily.  The patient is to follow PT and INR protocol.  · 5/20/16 - Venous Doppler bilateral lower extremities normal.  · 7/30/19 - Patient continued on Coumadin following the INR protocol.      3. Anemia diagnosis established in November 2013 and pernicious anemia diagnosis established March 2016.   · 11/15/13 - Hemoglobin is 7.8.  · 12/2/13 - Orders written to start Aranesp 200 mg subcutaneous every two weeks due to low hemoglobin secondary to chemo induced anemia.  Hemoglobin is 9.7.  Anemia workup is ordered.  · 12/03/13 - Ferritin 179.8 (N), folic acid 8.3 (N), vitamin B12 314, iron 104 (N), TIBC 298 (N), iron saturation 35 (N), Reticulocyte count 0.88 (N).  EPO level 124 (N).  Haptoglobin 22 (H).  · 10/22/14 - Hemoglobin 12.5, hematocrit 37.3, MCV 91.6.  · 10/23/14 - Anemia  resolved.   · 3/8/16 - WBC 5.8, hemoglobin 11.7, MCV 90.3, platelet count 245,000. Vitamin B12 of 189 (180-914), ferritin 61 (), iron 91 (), TIBC 345 (228-428).   · 3/15/16 -  ().        · 3/22/16 - Intrinsic factor antibody positive, antiparietal antibody negative. Vitamin B12 at 1000 mcg IM weekly started, but the patient after receiving first dose on 3/22/16 did not come back for injections until 4/13/16.   · 4/13/16 - WBC 5.1, hemoglobin 12.5, MCV 87.9, platelet count 201,000. Patient given B12 injection and then continued at home.   · 5/10/16 - WBC 5.1, hemoglobin 12.5, platelet count 201,000.   · 6/14/16 - Monthly Vitamin B12 injections continued at home.   · 7/11/16 - WBC 5.48, hemoglobin 12.7, MCV 86.2, platelet count 200,000.   · 8/16/16 - Patient continued on monthly Vitamin B12 injections at home.   · 1/5/17 - WBC 2.6 with 38% neutrophils, 56% lymphocytes, 5% monocytes, hemoglobin 10.5, .3, platelet count 63,000. Patient continued on Vitamin B12 injections 1000 mcg IM monthly at home.   · 3/20/17 - Retic 0.41 (0.5-1.5), creatinine 1.0 (0.4-1.0). Iron 12 (), TIBC 270 (228-428), ferritin 259 (), haptoglobin 340 (), TSH 0.43 (0.34-5.6), folate 6.0 (5.9-24.8). Serum protein electrophoresis revealed decreased gammaglobulins. Serum EDU had no monoclonal gammopathy identified. Stool Hemoccult was negative.   · 6/8/17 - WBC 6.3, hemoglobin 8.3, MCV 92.4, platelet count 50,000. Procrit 40,000 units subq weekly added for chemotherapy-induced anemia. Patient continued on Vitamin B12 at 1000 mcg IM monthly at home.   · 7/5/17 - Iron 33 (), TIBC 328 (228-428), ferritin 211 (), TSH 2.46 (0.34-5.6).       · 7/31/17 - WBC 5.0, hemoglobin 11.2, MCV 89.7, platelet count 217,000. We will continue with the Procrit 40,000 units subq weekly for chemo-induced anemia when the hemoglobin falls below 10.0 and the patient is also to continue with the Vitamin B12 at 1000  mcg IM monthly at home. Creatinine 1.24 (0.5-0.99).    · 8/28/17 - WBC 9.6, hemoglobin 8.7, MCV 93.7, platelet count 133,000. Patient is to continue with the Procrit 40,000 units subq weekly and will receive that today. She is also to continue with the Vitamin B12 at 1000 mcg IM monthly at home.  · 10/16/17 - WBC 7.5, hemoglobin 9.6, MCV 98.4, platelet count 195,000. Patient is to continue with Procrit 40,000 units subq weekly and will receive Procrit today.   · 1/1/18 - Creatinine 1.3 (0.4-1.0).    · 2/19/18 - Procrit given for hemoglobin 9.9.   · 2/26/18 - Continue Procrit 40,000 units weekly p.r.n. hemoglobin <10. Continue Vitamin B12 at 1000 mcg IM monthly at home.   · 3/26/18 - Hemoglobin 8.3. Procrit dose increased to 60,000 units weekly. Iron 29 (), TIBC 306 (228-428), and iron sat 9% (15-50). Iron 29 (), TIBC 306 (228-428), iron saturation 9% (15-50).   · 3/27/18 - Order written to start Ferrous sulfate 325 mg p.o. b.i.d.    · 4/2/18 - Stool heme negative x3.   · 4/30/18 - Patient had not started Ferrous sulfate 325 mg p.o. b.i.d. and verbalized understanding to do so and to notify the office if side effects are not tolerable.   · 5/24/18 - Patient was hospitalized at [facility] between 5/24/18 and 5/26/18 with dark tarry stool. INR was subtherapeutic. She was given IV Protonix and Protonix 40 mg daily. She underwent an EGD by David Rogers M.D. revealing small hiatal hernia, small submucosal nodule near the cardia, erosive gastritis. Pathology on gastric antrum and body biopsy revealed iron pill gastritis and no evidence of Helicobacter pylori. She then underwent a colonoscopy revealing diverticulosis, hemorrhoids and surgical changes from previous resection. It was recommended to consider outpatient M2A if hemoglobin continued to drop.   · 6/4/18 - Order written to discontinue iron pills and to use Injectafer 750 mg IV days 1 and 8 for low iron sats. Order written for Procrit 40,000 units  subq weekly. Iron 65 (), TIBC 328 (228-428), iron saturation 20% (15-50), ferritin 123 ().   · 6/29/18 - Discussed patient’s intolerance of oral iron due to gastritis. Oral iron discontinued with plans for Injectafer should iron level drop in the future.   · 11/7/18 - Patient claims not to be taking Vitamin B12 injections at home. Asked to resume those.   · 12/3/18 - Patient reports she has not been taking her B12 injections for a couple of months. She needs some syringes and needles for that.   · 2/4/19 - Patient was uncertain if she is currently taking ferrous Sulfate or not. Patient with history of intolerance and will follow hemoglobin.   · 5/8/19 - Ferritin 64 ().  · 8/27/2019- iron 52 (), iron saturation 14% (15-50), TIBC 364 (228-420), and ferritin 74 ().  Injectafer 750 mg IV day 1 and 8 due to oral iron intolerance ordered.  · 8/30/2019- Injectafer 750 mg IV day 1 given.  Hemoglobin 10.8.  At the end of the infusion heart rate was 48 and the patient reported mild dizziness.  Patient was sent to the ED for evaluation with symptoms resolving rapidly.  Chest x-ray and CT head were negative for acute findings.  · 9/6/2019-Injectafer 750 mg IV day 8 given without adverse effects.  Hemoglobin 9.8.   · 9/25/19 - Iron 85 (). Iron saturation 25 (15-50). TIBC 335 (228-428). Ferritin  694 ().  Hemoglobin 10.3.  · 10/25/2019 hemoglobin 9.7.  Patient started on Retacrit 40,000 units subcu weekly.  · 12/17/2020 hemoglobin 11.9, hematocrit 38.3     Past Medical History:   Diagnosis Date   • Anemia 2013   • Cervical disc disorder 2013    Herniated disc, pinched nerve   • Clotting disorder (HCC) 2013    Low platelets from chemo   • Colon polyp 2013   • Deep vein thrombosis (HCC) 2013   • Diverticulosis 2013   • GERD (gastroesophageal reflux disease) 2016   • HL (hearing loss) 2016    I need hearing aids   • Hyperlipidemia 2013    Need my cholesterol rechecked   • Hypertension    •  Joint pain 2013    Shoulder pain, torn rotator cuff   • Low back pain 2013   • Neuromuscular disorder (HCC) 2015    Shingles, pinched nerves in my neck   • Osteopenia 2014   • Osteoporosis    • Ovarian cancer (HCC)     ovarian--  spread to lungs 10/2020   • Ovarian cancer on left (HCC) 1995   • Pneumonia 2010    Have had it several times   • Spinal stenosis 2013    Cervical   • Urinary tract infection 2013    Have had several utis       Past Surgical History:   Procedure Laterality Date   • BRONCHOSCOPY N/A 2/10/2021    Procedure: BRONCHOSCOPY with bronchioalveolar lavage;  Surgeon: Jerica Esparza MD;  Location: [facility] ENDOSCOPY;  Service: Pulmonary;  Laterality: N/A;  post op:right lobe pneumonia   • BRONCHOSCOPY N/A 6/15/2021    Procedure: BRONCHOSCOPY with bronchoalveolar lavage;  Surgeon: Jerica Esparza MD;  Location: [facility] ENDOSCOPY;  Service: Pulmonary;  Laterality: N/A;  lung cancer;pneumonia   • CATARACT EXTRACTION     • COLON SURGERY     • COLONOSCOPY  05/26/2018   • EXPLORATORY LAPAROTOMY     • HERNIA REPAIR     • HYSTERECTOMY     • OOPHORECTOMY           Current Outpatient Medications:   •  acetaminophen (TYLENOL) 500 MG tablet, Take 1,000 mg by mouth Every 6 (Six) Hours As Needed for Mild Pain ., Disp: , Rfl:   •  Calcium Carbonate 1500 (600 Ca) MG tablet, Take 600 mg by mouth 2 (Two) Times a Day., Disp: , Rfl:   •  calcium carbonate-vitamin d 600-400 MG-UNIT per tablet, Take 1 tablet by mouth 2 (Two) Times a Day., Disp: 60 tablet, Rfl: 3  •  cefdinir (OMNICEF) 300 MG capsule, Take 1 capsule by mouth 2 (Two) Times a Day., Disp: 5 capsule, Rfl: 0  •  cetirizine (zyrTEC) 10 MG tablet, Take 1 tablet by mouth Every Night., Disp: 30 tablet, Rfl: 0  •  Combivent Respimat  MCG/ACT inhaler, INHALE 1 PUFF FOUR TIMES DAILY AS NEEDED FOR SHORTNESS OF BREATH, Disp: , Rfl:   •  cyanocobalamin 1000 MCG/ML injection, Inject 1,000 mcg into the appropriate muscle as directed by prescriber Every 28 (Twenty-Eight) Days.,  Disp: , Rfl:   •  dexamethasone (DECADRON) 6 MG tablet, Take 1 tablet by mouth Daily With Breakfast., Disp: 5 tablet, Rfl: 0  •  famotidine (PEPCID) 40 MG tablet, Take 1 tablet by mouth Every Morning Before Breakfast., Disp: 90 tablet, Rfl: 1  •  folic acid (FOLVITE) 1 MG tablet, Take 1 tablet by mouth Daily., Disp: 30 tablet, Rfl: 1  •  HYDROcodone-homatropine (HYCODAN) 5-1.5 MG/5ML syrup, Take 5 mL by mouth Every 8 (Eight) Hours As Needed for Cough., Disp: 120 mL, Rfl: 0  •  letrozole (FEMARA) 2.5 MG tablet, Take 1 tablet by mouth Daily., Disp: 30 tablet, Rfl: 6  •  MAGnesium-Oxide 400 (241.3 Mg) MG tablet tablet, Take 400 mg by mouth 3 (Three) Times a Day., Disp: , Rfl:   •  Misc. Devices (Commode Bedside) misc, 1 Device Daily., Disp: 1 each, Rfl: 0  •  Misc. Devices (Roller Walker) misc, 1 Device Daily., Disp: 1 each, Rfl: 0  •  montelukast (SINGULAIR) 10 MG tablet, TAKE 1 TABLET BY MOUTH EVERY NIGHT, Disp: 30 tablet, Rfl: 2  •  oxyCODONE (ROXICODONE) 20 MG tablet, Take 1 tablet by mouth Every 4 (Four) Hours As Needed for Moderate Pain ., Disp: 180 tablet, Rfl: 0  •  oxyCODONE (ROXICODONE) 20 MG tablet, Take 1 tablet by mouth Every 4 (Four) Hours As Needed for Moderate Pain ., Disp: 180 tablet, Rfl: 0  •  phosphorus (K PHOS NEUTRAL) 155-852-130 MG tablet, Take 1 tablet by mouth 3 (Three) Times a Day., Disp: 90 tablet, Rfl: 3  •  potassium chloride (K-DUR,KLOR-CON) 20 MEQ CR tablet, Take 60 mEq by mouth Daily., Disp: , Rfl:   •  potassium chloride (K-DUR,KLOR-CON) 20 MEQ CR tablet, Take 40 mEq by mouth Every Night., Disp: , Rfl:   •  pregabalin (LYRICA) 100 MG capsule, Take 1 capsule by mouth 3 (Three) Times a Day., Disp: 15 capsule, Rfl: 0  •  sertraline (ZOLOFT) 50 MG tablet, Take 50 mg by mouth Every Night., Disp: , Rfl:   •  temazepam (RESTORIL) 30 MG capsule, Take 1 capsule by mouth At Night As Needed for Sleep., Disp: 30 capsule, Rfl: 1  •  triamterene-hydrochlorothiazide (DYAZIDE) 37.5-25 MG per capsule,  Take 1 capsule by mouth Daily., Disp: 90 capsule, Rfl: 1  •  warfarin (COUMADIN) 2 MG tablet, DVT  Indications: Atrial Fibrillation (Patient taking differently: 5 mg. DVT  Indications: Atrial Fibrillation), Disp: 30 tablet, Rfl: 0    Allergies   Allergen Reactions   • Morphine Nausea Only and Hives   • Penicillin V Potassium Rash   • Sulfa Antibiotics Rash   • Levaquin [Levofloxacin] Nausea Only and Dizziness     When taken with Coumadin   • Amoxicillin Rash       Family History   Problem Relation Age of Onset   • Hypertension Mother    • Cancer Father    • Hypertension Father    • Hypertension Sister    • Hypertension Brother    • Stroke Brother        Cancer-related family history includes Cancer in her father.    Social History     Tobacco Use   • Smoking status: Never Smoker   • Smokeless tobacco: Never Used   Vaping Use   • Vaping Use: Never used   Substance Use Topics   • Alcohol use: No     Comment: caffeine 32-64oz qd   • Drug use: No     HPI, ROS and PFSH have been reviewed and confirmed on 10/26/2021.     SUBJECTIVE:    Patient is accompanied today by her daughter for this appointment. She does not have any specific complaints.  Is not requiring oxygen therapy at home.  Patient stated she still wants to continue with palliative treatments.  She is currently on letrozole.  She has decided to pursue palliative whole brain radiation.    REVIEW OF SYSTEMS:    Review of Systems   Constitutional: Negative for chills and fever.   HENT: Negative for ear pain, mouth sores, nosebleeds and sore throat.    Eyes: Negative for photophobia and visual disturbance.   Respiratory: Negative for wheezing and stridor.    Cardiovascular: Negative for chest pain and palpitations.   Gastrointestinal: Negative for abdominal pain, diarrhea, nausea and vomiting.   Endocrine: Negative for cold intolerance and heat intolerance.   Genitourinary: Negative for dysuria and hematuria.   Musculoskeletal: Negative for joint swelling and neck  "stiffness.   Skin: Negative for color change and rash.   Neurological: Negative for seizures and syncope.   Hematological: Negative for adenopathy.        No obvious bleeding   Psychiatric/Behavioral: Negative for agitation, confusion and hallucinations.     OBJECTIVE:  Vitals:    10/26/21 1545   BP: 130/81   Pulse: 79   Resp: 20   Temp: 97.3 °F (36.3 °C)   TempSrc: Infrared   SpO2: 94%   Weight: 70.8 kg (156 lb)   Height: 165.1 cm (65\")   PainSc:   6   PainLoc: Comment: shoulders, neck     Temp 97.5   Pulse 64  RR 18  /92  Height 165.1 cm   Weight 81.6 kg   BSA 1.89  BMI 30    ECOG  (1) Restricted in physically strenuous activity, ambulatory and able to do work of light nature    Physical Exam   Constitutional: She is oriented to person, place, and time. No distress.   Obese    HENT:   Head: Normocephalic and atraumatic.   Mouth/Throat: Mucous membranes are moist.   Eyes: Conjunctivae are normal. Right eye exhibits no discharge. Left eye exhibits no discharge. No scleral icterus.   Neck: No thyromegaly present.   Cardiovascular: Normal rate, regular rhythm and normal heart sounds. Exam reveals no gallop and no friction rub.   Pulmonary/Chest: Effort normal. No stridor. No respiratory distress. She has no wheezes.   Left chest wall port    Abdominal: Soft. Bowel sounds are normal. She exhibits no mass. There is no abdominal tenderness. There is no rebound and no guarding.   Musculoskeletal: Normal range of motion. No tenderness.   Lymphadenopathy:     She has no cervical adenopathy.   Neurological: She is alert and oriented to person, place, and time. She exhibits normal muscle tone.   Skin: Skin is warm and dry. She is not diaphoretic. No erythema.   Psychiatric: Her behavior is normal. Mood normal.   Nursing note and vitals reviewed.    I have reexamined the patient and the results are consistent with the previously documented exam. Cindy Ball MD     RECENT LABS    WBC   Date Value Ref Range " Status   10/26/2021 8.94 3.40 - 10.80 10*3/mm3 Final   07/05/2020 4.04 (L) 4.5 - 11.0 10*3/uL Final     RBC   Date Value Ref Range Status   10/26/2021 4.09 3.77 - 5.28 10*6/mm3 Final   07/05/2020 3.68 (L) 4.0 - 5.2 10*6/uL Final     Hemoglobin   Date Value Ref Range Status   10/26/2021 10.9 (L) 12.0 - 15.9 g/dL Final   07/05/2020 11.5 (L) 12.0 - 16.0 g/dL Final     Hematocrit   Date Value Ref Range Status   10/26/2021 36.5 34.0 - 46.6 % Final   07/05/2020 35.3 (L) 36.0 - 46.0 % Final     MCV   Date Value Ref Range Status   10/26/2021 89.2 79.0 - 97.0 fL Final   07/05/2020 95.9 80.0 - 100.0 fL Final     MCH   Date Value Ref Range Status   10/26/2021 26.7 26.6 - 33.0 pg Final   07/05/2020 31.3 26.0 - 34.0 pg Final     MCHC   Date Value Ref Range Status   10/26/2021 29.9 (L) 31.5 - 35.7 g/dL Final   07/05/2020 32.6 31.0 - 37.0 g/dL Final     RDW   Date Value Ref Range Status   10/26/2021 18.3 (H) 12.3 - 15.4 % Final   07/05/2020 15.9 12.0 - 16.8 % Final     RDW-SD   Date Value Ref Range Status   10/26/2021 58.8 (H) 37.0 - 54.0 fl Final     MPV   Date Value Ref Range Status   10/26/2021 10.9 6.0 - 12.0 fL Final   07/05/2020 10.2 6.7 - 10.8 fL Final     Platelets   Date Value Ref Range Status   10/26/2021 244 140 - 450 10*3/mm3 Final   07/05/2020 158 140 - 440 10*3/uL Final     Neutrophil Rel %   Date Value Ref Range Status   07/05/2020 54.0 45 - 80 % Final     Neutrophil %   Date Value Ref Range Status   10/26/2021 74.8 42.7 - 76.0 % Final     Lymphocyte Rel %   Date Value Ref Range Status   07/05/2020 30.4 15 - 50 % Final     Lymphocyte %   Date Value Ref Range Status   10/26/2021 10.4 (L) 19.6 - 45.3 % Final     Monocyte Rel %   Date Value Ref Range Status   07/05/2020 12.4 0 - 15 % Final     Monocyte %   Date Value Ref Range Status   10/26/2021 13.4 (H) 5.0 - 12.0 % Final     Eosinophil %   Date Value Ref Range Status   10/26/2021 1.2 0.3 - 6.2 % Final   07/05/2020 3.0 0 - 7 % Final     Basophil Rel %   Date Value  Ref Range Status   07/05/2020 0.2 0 - 2 % Final     Basophil %   Date Value Ref Range Status   10/26/2021 0.2 0.0 - 1.5 % Final     Immature Grans %   Date Value Ref Range Status   07/05/2020 0.0 0 % Final     Neutrophils Absolute   Date Value Ref Range Status   07/05/2020 2.18 2.0 - 8.8 10*3/uL Final     Neutrophils, Absolute   Date Value Ref Range Status   10/26/2021 6.68 1.70 - 7.00 10*3/mm3 Final     Lymphocytes Absolute   Date Value Ref Range Status   07/05/2020 1.23 0.7 - 5.5 10*3/uL Final     Lymphocytes, Absolute   Date Value Ref Range Status   10/26/2021 0.93 0.70 - 3.10 10*3/mm3 Final     Monocytes Absolute   Date Value Ref Range Status   07/05/2020 0.50 0.0 - 1.7 10*3/uL Final     Monocytes, Absolute   Date Value Ref Range Status   10/26/2021 1.20 (H) 0.10 - 0.90 10*3/mm3 Final     Eosinophils Absolute   Date Value Ref Range Status   07/05/2020 0.12 0.0 - 0.8 10*3/uL Final     Eosinophils, Absolute   Date Value Ref Range Status   10/26/2021 0.11 0.00 - 0.40 10*3/mm3 Final     Basophils Absolute   Date Value Ref Range Status   07/05/2020 0.01 0.0 - 0.2 10*3/uL Final     Basophils, Absolute   Date Value Ref Range Status   10/26/2021 0.02 0.00 - 0.20 10*3/mm3 Final     Immature Grans, Absolute   Date Value Ref Range Status   07/05/2020 0.00 <1 10*3/uL Final     nRBC   Date Value Ref Range Status   10/05/2021 0.1 0.0 - 0.2 /100 WBC Final       Lab Results   Component Value Date    GLUCOSE 148 (H) 10/08/2021    BUN 15 10/08/2021    CREATININE 0.80 10/20/2021    EGFRIFNONA 69 10/08/2021    EGFRIFAFRI >60 06/07/2019    BCR 18.1 10/08/2021    K 4.5 10/08/2021    CO2 24.0 10/08/2021    CALCIUM 8.4 (L) 10/08/2021    ALBUMIN 3.10 (L) 10/08/2021    LABIL2 1.3 07/03/2020    AST 12 10/08/2021    ALT 6 10/08/2021     ASSESSMENT:    1. 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  She was on carboplatinum, Doxil.  Patient had had carboplatinum Taxol, carbo Taxotere,  Gemzar single agent, niraparib and was on Doxil and carboplatin.  Doxil was discontinued due to approaching of lifetime dosing.  Progressed on single agent topotecan ,Alimta and combination chemotherapy with Gemzar plus cis-platinum.  Patient also progressed on oral etoposide.  Refer to paradigm testing for future options at time of progression.  Patient has had progression of disease and therefore platinum was discontinued. On exam today I felt a right lower abdominal mass which is new.  She has CT scan of the chest, abdomen and pelvis on 3/1/2021 this showed moderate size pericardial effusion, scattered pulmonary nodules with septal thickening worse in the right lung compared to the left.  Pathologically enlarged hilar and mediastinal lymph nodes were suspicious for metastatic disease.  CT of the abdomen and pelvis showed increase in size of multiple soft tissue masses involving the anterior abdominal wall likely related to worsening of metastatic disease.  Increase in size of multiple sclerotic lesions within the lumbar spine and pelvis.  A 2D echocardiogram which did not reveal any significant pericardial effusion on 3/4/2021.  Recent CT scan of the chest suggested patient may have lymphangitic spread of malignancy.  Patient developed more pulmonary symptoms while on single agent oral etoposide.  Therefore this chemotherapy has been discontinued.  She was on single agent vinorelbine but now wants to hold off on chemo due to her recent infection with COVID-19 and hospitalization.  Patient is open to trying endocrine therapy: Letrozole has been recommended.  This is her preference at this time. We will continue letrozole through the course of her radiation treatment to the whole brain.  I have reviewed also reviewed her latest CT scans of the chest, abdomen and pelvis which shows evidence of progressive disease.  She will continue letrozole and I will repeat her scan in 6 weeks which would be in early December  2021.  If she shows evidence of progressive disease at that time we will switch therapy.  Options as listed below.  2. Reviewed the literature and other potential treatment options for her will include single agent vinorelbine, Xeloda, Cytoxan, oxaliplatin and aromatase inhibitors including letrozole and Faslodex.  If she has a high tumor mutational burden or PD-L1 expression, immunotherapy could also be an option. Also will proceed with biopsying of the anterior abdominal mass to send tissue for foundation 1 testing.  Reviewed results of foundation 1 testing, no actionable mutations.  PD-L1 was ordered but tissue not enough for testing.  3. Patient has a subcutaneous nodule noted on the anterior abdominal wall consistent with metastatic disease currently measures 3.5 cm.  The anterior abdominal wall nodule has not significantly changed.  We will continue to monitor as the index cutaneous lesion.  4. Patient has bone metastases therefore I recommended Xgeva 120 mg subcu monthly.  Reviewed the side effects, benefits in detail with patient.  She would also be initiated on Os-Johnny D 600 mg twice a day.  She will continue Xgeva  5. Progressive brain metastasis: Status post stereotactic brain radiation under the care of Dr. Delong and recent MRI of the brain on 12/15/2020 shows probable progression.  Recent brain MRI did not show any obvious signs of progression.  6. B12 deficiency on parenteral B12 injections  7. History of DVT on anticoagulation therapy with warfarin:   8. Pancytopenia: Due to chemotherapy: On Procrit  9. Hypomagnesemia: Continue to monitor levels and infuse as needed  10. Iron deficiency anemia: Monitoring  11. Post hospital visit  12. Assessment has been reviewed and updated as documented above    Discussion:    Begin letrozole 2.5 mg daily reviewed.  Reviewed side effects    Side effects of aromatase inhibitors include risk of musculoskeletal side effect including arthralgia, myalgia, especially in  patients who have underlying musculoskeletal disease such as osteoarthritis, hot flashes, mood changes. There is risk of vaginal dryness, dyspareunia and other sexual dysfunction. Other side effects include degree of cognitive symptoms compared to women not on endocrine therapy. There is possibility of fatigue, forgetfulness, poor sleep hygiene, osteoporosis, osteopenia, risk of fractures, cardiovascular disease and hypercholestolemia.         PLANS:    1. Foundation 1 testing reviewed. No actionable mutation. PD-L1 not done due to inadequate tissue specimen  2. INR next week  3. Continue with whole brain radiation and cervical spine radiation: Patient's daughter to schedule radiation with Dr. Delong  4. Continue letrozole 2.5 mg p.o. daily  5. Staging CT scans of the chest, abdomen and pelvis first week in December 2021 to assess for response with letrozole.  6. Continue calcium with vitamin D  7. Increase phosphorus supplements to 3 times a day  8. Follow-up with me in 4 weeks  9. Discussed hospice patient not ready for hospice yet 10/7/2021  10. Continue supportive care  11. Continue Xgeva 120 mg subcu monthly  12. Continue folic acid 1 mg p.o. daily -reviewed needs to stay on drug to prevent side effects from Alimta   13. Continue B12 injections 1000 mcg IM monthly, next due 1/14/2021  14. Status post IV Injectafer  15. Continue magnesium oxide 400 mg three times a day and weekly IV replacement as needed.   16. Continue Retacrit 40,000 units subcu weekly for hemoglobin less than 10, for chemotherapy-induced anemia  17. Continue supportive medications  18. All questions answered     I have reviewed labs results, imaging, vitals, and medications with the patient today.   Lab Results   Component Value Date    WBC 8.94 10/26/2021    HGB 10.9 (L) 10/26/2021    HCT 36.5 10/26/2021    MCV 89.2 10/26/2021     10/26/2021     Patient verbalized understanding and is in agreement of the above plans.        I spent 40  total minutes, face-to-face, caring for Tahira today.  90% of this time involved counseling and/or coordination of care as documented within this note.

## 2021-10-27 NOTE — TELEPHONE ENCOUNTER
[facility] IS OUT OF [facility] BUT SAID THAT THE [facility] IN [place] HAS IT IN STOCK. CAN YOU RESEND THE RX THERE. THERE PHONE NUMBER [phone] IF YOU NEED IT.

## 2021-10-29 NOTE — TELEPHONE ENCOUNTER
Pt's daughter returned call to office. I spoke with Danae Gomez NP and she states she will call antibiotic in. I advised Pati to take pt to ER if she continues to get worse while on antibiotics, she verbalized understanding.

## 2021-10-29 NOTE — TELEPHONE ENCOUNTER
I attempted to return call to pt's daughter with no answer, voicemail was left for pt to return call to office.

## 2021-10-29 NOTE — TELEPHONE ENCOUNTER
Caller: DANIELLE MOLINA    Relationship: Emergency Contact    Best call back number: [phone]    What is the best time to reach you: ANYTIME    Who are you requesting to speak with (clinical staff, provider,  specific staff member): NURSE    What was the call regarding: DAUGHTER CALLED STATING THAT PATIENT HAS DEVELOPED A FEVER AND A SEVERE COUGH THAT IS PRODUCTIVE.  SHE HAS HAD CHILLS.   THEY ARE REQUESTING AN ANTIBIOTIC AS DISCUSSED WITH PATIENT ON 10/26/21    [id number] [place] [id number] AT SEC OF [facility] [address] [facility] [address]  - [phone]  - [phone] FX    Do you require a callback: YES

## 2021-11-02 NOTE — PROGRESS NOTES
Patient INR at 1.9 today, patient is still on ABX.  She advised sh has 5 more days left.  Advised her to start 5.5mg and we will recheck her Friday prior to radiation, she VU

## 2021-11-03 NOTE — TELEPHONE ENCOUNTER
Patient sent message asking us to refill cough med and to get mucinex as well.    Scripts pended for review and release

## 2021-11-03 NOTE — PROGRESS NOTES
"Patient Name: Tahira Zimmerman Date: 11/3/2021       : 1955        MRN #: [id number] Diagnosis:   Encounter Diagnosis   Name Primary?   • Brain metastases (HCC) Yes                     RADIATION ON TREATMENT VISIT NOTE - BRAIN    Treatment Summary    Treatment Site Ref. ID Energy Dose/Fx (cGy) #Fx Dose Correction (cGy) Total Dose (cGy) Start Date End Date Elapsed Days   WhBrain WhBrain 6X 300 3 / 10 0 900 2021 11/3/2021    C3_C4 C3_C4 6X 300 3/ 10 0 900 2021 11/3/2021      Symptomatic brain/clival and upper C spine mets  Intent palliative    General:           Review of Systems      [x] No new complaints [] Headache   []AM [] PM [] Seizure activity  [] Memory loss [] Gait disturbance [] Nausea  [] Vomiting [] Speech changes [] Visual changes  [] Weakness [] Skin itching [] Hair loss  [] Skin soreness with pain  [x] Fatigue,  severity: 1 Relief w/ Rest  [] Pain,  severity: ----------------, location:   Steroid regimen: Decadron 6mg   Anti-seizure regimen:   Pain regimen:    Skin regimen: Aquaphor     Comments/Notes:   Will confirm but don't think she is on steroids  No new symptoms    KPS: 70%       Vital Signs: /83   Pulse 93   Temp 97.4 °F (36.3 °C) (Oral)   Resp 17   Ht 165.1 cm (65\")   Wt 69.9 kg (154 lb)   LMP  (LMP Unknown)   SpO2 95%   BMI 25.63 kg/m²     Weight:   Wt Readings from Last 3 Encounters:   21 69.9 kg (154 lb)   10/26/21 70.8 kg (156 lb)   10/07/21 71.2 kg (157 lb)       Medication:   Current Outpatient Medications:   •  acetaminophen (TYLENOL) 500 MG tablet, Take 1,000 mg by mouth Every 6 (Six) Hours As Needed for Mild Pain ., Disp: , Rfl:   •  Calcium Carbonate 1500 (600 Ca) MG tablet, Take 600 mg by mouth 2 (Two) Times a Day., Disp: , Rfl:   •  calcium carbonate-vitamin d 600-400 MG-UNIT per tablet, Take 1 tablet by mouth 2 (Two) Times a Day., Disp: 60 tablet, Rfl: 3  •  cefdinir (OMNICEF) 300 MG capsule, Take 1 capsule by mouth 2 (Two) Times a Day., " Disp: 5 capsule, Rfl: 0  •  cetirizine (zyrTEC) 10 MG tablet, Take 1 tablet by mouth Every Night., Disp: 30 tablet, Rfl: 0  •  Combivent Respimat  MCG/ACT inhaler, INHALE 1 PUFF FOUR TIMES DAILY AS NEEDED FOR SHORTNESS OF BREATH, Disp: , Rfl:   •  cyanocobalamin 1000 MCG/ML injection, Inject 1,000 mcg into the appropriate muscle as directed by prescriber Every 28 (Twenty-Eight) Days., Disp: , Rfl:   •  dexamethasone (DECADRON) 6 MG tablet, Take 1 tablet by mouth Daily With Breakfast., Disp: 5 tablet, Rfl: 0  •  doxycycline (VIBRAMYICN) 100 MG tablet, Take 1 tablet by mouth 2 (Two) Times a Day for 7 days., Disp: 14 tablet, Rfl: 0  •  famotidine (PEPCID) 40 MG tablet, Take 1 tablet by mouth Every Morning Before Breakfast., Disp: 90 tablet, Rfl: 1  •  folic acid (FOLVITE) 1 MG tablet, Take 1 tablet by mouth Daily., Disp: 30 tablet, Rfl: 1  •  guaiFENesin (Mucinex) 600 MG 12 hr tablet, Take 2 tablets by mouth 2 (Two) Times a Day., Disp: 60 tablet, Rfl: 1  •  HYDROcodone-homatropine (HYCODAN) 5-1.5 MG/5ML syrup, Take 5 mL by mouth Every 8 (Eight) Hours As Needed for Cough., Disp: 120 mL, Rfl: 0  •  letrozole (FEMARA) 2.5 MG tablet, Take 1 tablet by mouth Daily., Disp: 30 tablet, Rfl: 6  •  MAGnesium-Oxide 400 (241.3 Mg) MG tablet tablet, Take 400 mg by mouth 3 (Three) Times a Day., Disp: , Rfl:   •  Misc. Devices (Commode Bedside) misc, 1 Device Daily., Disp: 1 each, Rfl: 0  •  Misc. Devices (Roller Walker) misc, 1 Device Daily., Disp: 1 each, Rfl: 0  •  montelukast (SINGULAIR) 10 MG tablet, TAKE 1 TABLET BY MOUTH EVERY NIGHT, Disp: 30 tablet, Rfl: 2  •  oxyCODONE (ROXICODONE) 20 MG tablet, Take 1 tablet by mouth Every 4 (Four) Hours As Needed for Moderate Pain ., Disp: 180 tablet, Rfl: 0  •  oxyCODONE (ROXICODONE) 20 MG tablet, Take 1 tablet by mouth Every 4 (Four) Hours As Needed for Moderate Pain ., Disp: 180 tablet, Rfl: 0  •  phosphorus (K PHOS NEUTRAL) 155-852-130 MG tablet, Take 1 tablet by mouth 3 (Three)  Times a Day., Disp: 90 tablet, Rfl: 3  •  potassium chloride (K-DUR,KLOR-CON) 20 MEQ CR tablet, Take 60 mEq by mouth Daily., Disp: , Rfl:   •  potassium chloride (K-DUR,KLOR-CON) 20 MEQ CR tablet, Take 40 mEq by mouth Every Night., Disp: , Rfl:   •  pregabalin (LYRICA) 100 MG capsule, Take 1 capsule by mouth 3 (Three) Times a Day., Disp: 15 capsule, Rfl: 0  •  sertraline (ZOLOFT) 50 MG tablet, Take 50 mg by mouth Every Night., Disp: , Rfl:   •  temazepam (RESTORIL) 30 MG capsule, Take 1 capsule by mouth At Night As Needed for Sleep., Disp: 30 capsule, Rfl: 1  •  triamterene-hydrochlorothiazide (DYAZIDE) 37.5-25 MG per capsule, Take 1 capsule by mouth Daily., Disp: 90 capsule, Rfl: 1  •  warfarin (COUMADIN) 2 MG tablet, DVT  Indications: Atrial Fibrillation (Patient taking differently: 5 mg. DVT  Indications: Atrial Fibrillation), Disp: 30 tablet, Rfl: 0       LABS (Reviewed):  Hematology WBC   Date Value Ref Range Status   10/26/2021 8.94 3.40 - 10.80 10*3/mm3 Final   07/05/2020 4.04 (L) 4.5 - 11.0 10*3/uL Final     RBC   Date Value Ref Range Status   10/26/2021 4.09 3.77 - 5.28 10*6/mm3 Final   07/05/2020 3.68 (L) 4.0 - 5.2 10*6/uL Final     Hemoglobin   Date Value Ref Range Status   10/26/2021 10.9 (L) 12.0 - 15.9 g/dL Final   07/05/2020 11.5 (L) 12.0 - 16.0 g/dL Final     Hematocrit   Date Value Ref Range Status   10/26/2021 36.5 34.0 - 46.6 % Final   07/05/2020 35.3 (L) 36.0 - 46.0 % Final     Platelets   Date Value Ref Range Status   10/26/2021 244 140 - 450 10*3/mm3 Final   07/05/2020 158 140 - 440 10*3/uL Final      Chemistry   Lab Results   Component Value Date    GLUCOSE 148 (H) 10/08/2021    BUN 15 10/08/2021    CREATININE 0.80 10/20/2021    EGFRIFNONA 69 10/08/2021    EGFRIFAFRI >60 06/07/2019    BCR 18.1 10/08/2021    K 4.5 10/08/2021    CO2 24.0 10/08/2021    CALCIUM 8.4 (L) 10/08/2021    ALBUMIN 3.10 (L) 10/08/2021    LABIL2 1.3 07/03/2020    AST 12 10/08/2021    ALT 6 10/08/2021         Physical Exam:          Neurology: [x] CNII-XII grossly intact    [] Strength:     [] Reflexes:     [] Encephalopathy:     [] Memory impairment:     [] Neuropathy: motor/sensory:   Auditory/Ear: [] Otitis, external ear (non-infectious):   Ocular/Visual: [] PERRLA/EOMI:   Skin:  stGstrstastdstest:st st1st Comments/Notes:     [x] Review of labs, images, dosimetry, dose delivered, & treatment parameters.    Comments:     [x] Patient treatment setup reviewed.    Comments:     Recommendations:continue XRT and supportive measures  Will confirm note on steroids---doesn't appear to need at this time.    [x] Continue RT  [] Change RT Plan [] Hold RT, length:        Approved Electronically By:  Partha Delong MD, 11/3/2021, 15:26 EDT          Confidentiality of this medical record shall be maintained except when use or disclosure is required or permitted by law, regulation or written authorization by the patient.

## 2021-11-05 NOTE — TELEPHONE ENCOUNTER
Called patient's daughter to talk about fatigue and informed her of what Dr. Delong prescribes and when he prescribes it.

## 2021-11-10 NOTE — PROGRESS NOTES
"Patient Name: Tahira Zimmerman Date: 11/10/2021       : 1955 Physician: No primary care provider on file.       MRN #: [id number] Diagnosis:   Encounter Diagnoses   Name Primary?   • Brain metastases (HCC) Yes   • Bone metastases (HCC)                  RADIATION ON TREATMENT VISIT NOTE - BRAIN    Treatment Summary    Treatment Site Ref. ID Energy Dose/Fx (cGy) #Fx Dose Correction (cGy) Total Dose (cGy) Start Date End Date Elapsed Days   WhBrain WhBrain 6X 300 7 / 10 0 2,100 2021 11/10/2021 9   C3_C4 C3_C4 6X 300 7 / 10 0 2,100 2021 11/10/2021 9     Intent:palliative  General:           Review of Systems      [] No new complaints [] Headache   []AM [] PM [] Seizure activity  [x] Memory loss [] Gait disturbance [] Nausea  [x] Vomiting [] Speech changes [] Visual changes  [] Weakness [] Skin itching [] Hair loss  [] Skin soreness with pain  [] Fatigue,  severity: ----------------  [] Pain,  severity: ----------------, location:   Steroid regimen:   Anti-seizure regimen:   Pain regimen:    Skin regimen: Aquaphor     Comments/Notes:  Bone and neck pain improved, no headaches now    KPS: 70%       Vital Signs: /93   Pulse 96   Temp 98 °F (36.7 °C) (Oral)   Resp 18   Ht 165.1 cm (65\")   Wt 68.5 kg (151 lb)   LMP  (LMP Unknown)   SpO2 92%   BMI 25.13 kg/m²     Weight:   Wt Readings from Last 3 Encounters:   11/10/21 68.5 kg (151 lb)   21 69.9 kg (154 lb)   10/26/21 70.8 kg (156 lb)       Medication:   Current Outpatient Medications:   •  acetaminophen (TYLENOL) 500 MG tablet, Take 1,000 mg by mouth Every 6 (Six) Hours As Needed for Mild Pain ., Disp: , Rfl:   •  Calcium Carbonate 1500 (600 Ca) MG tablet, Take 600 mg by mouth 2 (Two) Times a Day., Disp: , Rfl:   •  calcium carbonate-vitamin d 600-400 MG-UNIT per tablet, Take 1 tablet by mouth 2 (Two) Times a Day., Disp: 60 tablet, Rfl: 3  •  cetirizine (zyrTEC) 10 MG tablet, Take 1 tablet by mouth Every Night., Disp: 30 tablet, Rfl: " 0  •  Combivent Respimat  MCG/ACT inhaler, INHALE 1 PUFF FOUR TIMES DAILY AS NEEDED FOR SHORTNESS OF BREATH, Disp: , Rfl:   •  cyanocobalamin 1000 MCG/ML injection, Inject 1,000 mcg into the appropriate muscle as directed by prescriber Every 28 (Twenty-Eight) Days., Disp: , Rfl:   •  famotidine (PEPCID) 40 MG tablet, Take 1 tablet by mouth Every Morning Before Breakfast., Disp: 90 tablet, Rfl: 1  •  folic acid (FOLVITE) 1 MG tablet, Take 1 tablet by mouth Daily., Disp: 30 tablet, Rfl: 1  •  guaiFENesin (Mucinex) 600 MG 12 hr tablet, Take 2 tablets by mouth 2 (Two) Times a Day., Disp: 60 tablet, Rfl: 1  •  HYDROcodone-homatropine (HYCODAN) 5-1.5 MG/5ML syrup, Take 5 mL by mouth Every 8 (Eight) Hours As Needed for Cough., Disp: 120 mL, Rfl: 0  •  letrozole (FEMARA) 2.5 MG tablet, Take 1 tablet by mouth Daily., Disp: 30 tablet, Rfl: 6  •  MAGnesium-Oxide 400 (241.3 Mg) MG tablet tablet, Take 400 mg by mouth 3 (Three) Times a Day., Disp: , Rfl:   •  Misc. Devices (Commode Bedside) misc, 1 Device Daily., Disp: 1 each, Rfl: 0  •  Misc. Devices (Roller Walker) misc, 1 Device Daily., Disp: 1 each, Rfl: 0  •  montelukast (SINGULAIR) 10 MG tablet, TAKE 1 TABLET BY MOUTH EVERY NIGHT, Disp: 30 tablet, Rfl: 2  •  ondansetron (ZOFRAN) 8 MG tablet, , Disp: , Rfl:   •  oxyCODONE (ROXICODONE) 20 MG tablet, Take 1 tablet by mouth Every 4 (Four) Hours As Needed for Moderate Pain ., Disp: 180 tablet, Rfl: 0  •  oxyCODONE (ROXICODONE) 20 MG tablet, Take 1 tablet by mouth Every 4 (Four) Hours As Needed for Moderate Pain ., Disp: 180 tablet, Rfl: 0  •  phosphorus (K PHOS NEUTRAL) 155-852-130 MG tablet, Take 1 tablet by mouth 3 (Three) Times a Day., Disp: 90 tablet, Rfl: 3  •  potassium chloride (K-DUR,KLOR-CON) 20 MEQ CR tablet, Take 60 mEq by mouth Daily., Disp: , Rfl:   •  potassium chloride (K-DUR,KLOR-CON) 20 MEQ CR tablet, Take 40 mEq by mouth Every Night., Disp: , Rfl:   •  pregabalin (LYRICA) 100 MG capsule, Take 1 capsule by  mouth 3 (Three) Times a Day., Disp: 15 capsule, Rfl: 0  •  sertraline (ZOLOFT) 50 MG tablet, Take 50 mg by mouth Every Night., Disp: , Rfl:   •  temazepam (RESTORIL) 30 MG capsule, Take 1 capsule by mouth At Night As Needed for Sleep., Disp: 30 capsule, Rfl: 1  •  triamterene-hydrochlorothiazide (DYAZIDE) 37.5-25 MG per capsule, Take 1 capsule by mouth Daily., Disp: 90 capsule, Rfl: 1  •  warfarin (COUMADIN) 2 MG tablet, DVT  Indications: Atrial Fibrillation (Patient taking differently: 5 mg. DVT  Indications: Atrial Fibrillation), Disp: 30 tablet, Rfl: 0  •  warfarin (COUMADIN) 4 MG tablet, Take as directed, Disp: 30 tablet, Rfl: 3       LABS (Reviewed):  Hematology WBC   Date Value Ref Range Status   10/26/2021 8.94 3.40 - 10.80 10*3/mm3 Final   07/05/2020 4.04 (L) 4.5 - 11.0 10*3/uL Final     RBC   Date Value Ref Range Status   10/26/2021 4.09 3.77 - 5.28 10*6/mm3 Final   07/05/2020 3.68 (L) 4.0 - 5.2 10*6/uL Final     Hemoglobin   Date Value Ref Range Status   10/26/2021 10.9 (L) 12.0 - 15.9 g/dL Final   07/05/2020 11.5 (L) 12.0 - 16.0 g/dL Final     Hematocrit   Date Value Ref Range Status   10/26/2021 36.5 34.0 - 46.6 % Final   07/05/2020 35.3 (L) 36.0 - 46.0 % Final     Platelets   Date Value Ref Range Status   10/26/2021 244 140 - 450 10*3/mm3 Final   07/05/2020 158 140 - 440 10*3/uL Final      Chemistry   Lab Results   Component Value Date    GLUCOSE 148 (H) 10/08/2021    BUN 15 10/08/2021    CREATININE 0.80 10/20/2021    EGFRIFNONA 69 10/08/2021    EGFRIFAFRI >60 06/07/2019    BCR 18.1 10/08/2021    K 4.5 10/08/2021    CO2 24.0 10/08/2021    CALCIUM 8.4 (L) 10/08/2021    ALBUMIN 3.10 (L) 10/08/2021    LABIL2 1.3 07/03/2020    AST 12 10/08/2021    ALT 6 10/08/2021         Physical Exam:         Neurology: [x] CNII-XII grossly intact    [] Strength:     [] Reflexes:     [] Encephalopathy:     [] Memory impairment:     [] Neuropathy: motor/sensory:   Auditory/Ear: [] Otitis, external ear (non-infectious):    Ocular/Visual: [] PERRLA/EOMI:   Skin:  stGstrstastdstest:st st1st Comments/Notes: CTAB  RRR  Dermal mets enlarging; none are bleeding or painful; to see Dr. Ball.    [x] Review of labs, images, dosimetry, dose delivered, & treatment parameters.    Comments:     [x] Patient treatment setup reviewed.    Comments:     Recommendations: continue XRT and supportive measures    [x] Continue RT  [] Change RT Plan [] Hold RT, length:        Approved Electronically By:  Partha Delong MD, 11/10/2021, 15:38 EST          Confidentiality of this medical record shall be maintained except when use or disclosure is required or permitted by law, regulation or written authorization by the patient.

## 2021-11-15 NOTE — TELEPHONE ENCOUNTER
From: Tahira Zimmerman  To: Fito Ram MD  Sent: 11/15/2021 11:58 AM EST  Subject: Radiation     I’ve been in more pain because of the radiation. Can I take one and a half for a few days?

## 2021-11-15 NOTE — TELEPHONE ENCOUNTER
Rx Refill Note  Requested Prescriptions     Pending Prescriptions Disp Refills   • MAGnesium-Oxide 400 (241.3 Mg) MG tablet tablet 30 each 3     Sig: Take 1 tablet by mouth 3 (Three) Times a Day.   • calcium carbonate-vitamin d 600-400 MG-UNIT per tablet 60 tablet 3     Sig: Take 1 tablet by mouth 2 (Two) Times a Day.   • potassium chloride (K-DUR,KLOR-CON) 20 MEQ CR tablet 90 tablet 3     Sig: Take 3 tablets by mouth Daily.   • potassium chloride (K-DUR,KLOR-CON) 20 MEQ CR tablet 60 tablet 3     Sig: Take 2 tablets by mouth Every Night.   • phosphorus (K PHOS NEUTRAL) 155-852-130 MG tablet 90 tablet 3     Sig: Take 1 tablet by mouth 3 (Three) Times a Day.   • folic acid (FOLVITE) 1 MG tablet 30 tablet 1     Sig: Take 1 tablet by mouth Daily.      Last office visit with prescribing clinician: 10/26/2021      Next office visit with prescribing clinician: Visit date not found            Anna Marie Muniz RN  11/15/21, 15:26 EST

## 2021-11-15 NOTE — TELEPHONE ENCOUNTER
Unable to get in touch with the pt regarding INR testing.  Pt is due to have XRT tomorrow.  Notified Mallory Nuñez that a INR is needed when here for XRT.  Appt made and notes placed on the appts at the cancer center to help remind staff.

## 2021-11-17 NOTE — TELEPHONE ENCOUNTER
Caller: EDE ROBLERO    Relationship to patient: SELF    Best call back number:     Patient is needing: THE PATIENT CALLED BACK BECAUSE SHE WANTED TO SPEAK WITH SOMEONE AND NOT JUST HAVE A MESSAGE SENT OVER. THE PATIENT STATED SHE HAS BEEN TRYING TO SPEAK WITH SOMEONE AT THE OFFICE FOR THREE DAYS AND HAS HAD NO LUCK AND NO ONE HAS CALLED HER BACK.    PLEASE CALL THE PATIENT AT THE NUMBER ABOVE.

## 2021-11-17 NOTE — TELEPHONE ENCOUNTER
"    Caller: RADHA DORSEYFIELD    Relationship: SELF    Best call back number:     Requested Prescriptions: OXYCODONE 20 MG  Requested Prescriptions      No prescriptions requested or ordered in this encounter        Pharmacy where request should be sent:      Additional details provided by patient: THE PHARMACY NEEDS THE DIRECTIONS CHANGED TO \"TAKE 1 TO 1/12 PER DAY\"    THE PATIENT'S CURRENT PHARMACY IS OUT OF HER MEDICATION    PATIENT NEEDS PHARMACY CHANGED TO :  [address] [place] [address]  PHONE: [phone]      Does the patient have less than a 3 day supply:  [x] Yes  [] No    Sada Rodriguez Rep   11/17/21 09:07 EST             "

## 2021-11-19 NOTE — PROGRESS NOTES
INR 1.3 today. Pt has been taking 5mg tablets and not the 5.5mg as directed.  Advised her to start 5.5mg daily and recheck next wednesday. Pt has had weight loss since starting radiation therapy.

## 2021-12-08 PROBLEM — T45.511A COUMADIN TOXICITY: Status: ACTIVE | Noted: 2021-01-01

## 2021-12-08 PROBLEM — E87.6 HYPOKALEMIA: Status: ACTIVE | Noted: 2021-01-01

## 2021-12-08 PROBLEM — J18.9 PNEUMONIA DUE TO INFECTIOUS ORGANISM: Status: ACTIVE | Noted: 2021-01-01

## 2021-12-08 PROBLEM — R41.82 AMS (ALTERED MENTAL STATUS): Status: ACTIVE | Noted: 2021-01-01

## 2021-12-08 NOTE — TELEPHONE ENCOUNTER
Spoke with Dr. Ball regarding multiple messages pt has sent via [employer] and called in and Dr. Ball advises pt go to ER for further evaluation with all of the different symptoms and complaints she has.  Called pt and LVM regarding this and [employer] message was also sent twice for pt to be evaluated by ER.

## 2021-12-08 NOTE — ED PROVIDER NOTES
Subjective    Chief Complaint   Patient presents with   • Shortness of Breath     Noemy Queen MD  No LMP recorded (lmp unknown). Patient has had a hysterectomy.  Allergies   Allergen Reactions   • Morphine Nausea Only and Hives   • Penicillin V Potassium Rash   • Sulfa Antibiotics Rash   • Levaquin [Levofloxacin] Nausea Only and Dizziness     When taken with Coumadin   • Amoxicillin Rash     History Provided By: Patient    History of Present Illness  Patient is a 66-year-old female presents emergency department for shortness of breath.  Patient reports that she has had shortness of breath for past 2 weeks.  She does report she wears oxygen at home, 6 L at baseline, but has felt like it is not enough.  She reports associated cough.  Nonproductive.  Denies fever or chills.  No chest pain.  No abnormal lower leg pain or swelling.  Patient has not had any episodes of dizziness, lightheadedness, diaphoresis or syncope.    Patient seen we did send patient with a note with several complaints, including intermittent confusion over the past week, fatigue.  Review of Systems   Constitutional: Positive for chills. Negative for diaphoresis, fatigue and fever.   Respiratory: Positive for cough and shortness of breath. Negative for chest tightness.    Cardiovascular: Negative for chest pain, palpitations and leg swelling.   Gastrointestinal: Negative for abdominal pain, diarrhea, nausea and vomiting.   Genitourinary: Negative for dysuria.   Musculoskeletal: Negative for back pain and neck pain.   Skin: Negative for color change and rash.   Neurological: Positive for weakness (Generalized). Negative for dizziness, syncope and light-headedness.       Past Medical History:   Diagnosis Date   • Anemia 2013   • Cervical disc disorder 2013    Herniated disc, pinched nerve   • Clotting disorder (HCC) 2013    Low platelets from chemo   • Colon polyp 2013   • Deep vein thrombosis (HCC) 2013   • Diverticulosis 2013   • GERD  (gastroesophageal reflux disease) 2016   • HL (hearing loss) 2016    I need hearing aids   • Hyperlipidemia 2013    Need my cholesterol rechecked   • Hypertension    • Joint pain 2013    Shoulder pain, torn rotator cuff   • Low back pain 2013   • Neuromuscular disorder (HCC) 2015    Shingles, pinched nerves in my neck   • Osteopenia 2014   • Osteoporosis    • Ovarian cancer (HCC)     ovarian--  spread to lungs 10/2020   • Ovarian cancer on left (HCC) 1995   • Pneumonia 2010    Have had it several times   • Spinal stenosis 2013    Cervical   • Urinary tract infection 2013    Have had several utis       Allergies   Allergen Reactions   • Morphine Nausea Only and Hives   • Penicillin V Potassium Rash   • Sulfa Antibiotics Rash   • Levaquin [Levofloxacin] Nausea Only and Dizziness     When taken with Coumadin   • Amoxicillin Rash       Past Surgical History:   Procedure Laterality Date   • BRONCHOSCOPY N/A 2/10/2021    Procedure: BRONCHOSCOPY with bronchioalveolar lavage;  Surgeon: Jerica Esparza MD;  Location: [facility] ENDOSCOPY;  Service: Pulmonary;  Laterality: N/A;  post op:right lobe pneumonia   • BRONCHOSCOPY N/A 6/15/2021    Procedure: BRONCHOSCOPY with bronchoalveolar lavage;  Surgeon: Jerica Esparza MD;  Location: [facility] ENDOSCOPY;  Service: Pulmonary;  Laterality: N/A;  lung cancer;pneumonia   • CATARACT EXTRACTION     • COLON SURGERY     • COLONOSCOPY  05/26/2018   • EXPLORATORY LAPAROTOMY     • HERNIA REPAIR     • HYSTERECTOMY     • OOPHORECTOMY         Family History   Problem Relation Age of Onset   • Hypertension Mother    • Cancer Father    • Hypertension Father    • Hypertension Sister    • Hypertension Brother    • Stroke Brother        Social History     Socioeconomic History   • Marital status:    Tobacco Use   • Smoking status: Never Smoker   • Smokeless tobacco: Never Used   Vaping Use   • Vaping Use: Never used   Substance and Sexual Activity   • Alcohol use: No     Comment: caffeine 32-64oz qd   •  Drug use: No   • Sexual activity: Not Currently     Partners: Male     Birth control/protection: Surgical           Objective   Physical Exam  Vitals and nursing note reviewed.   Constitutional:       General: She is not in acute distress.     Appearance: She is well-developed. She is ill-appearing. She is not toxic-appearing or diaphoretic.   HENT:      Head: Normocephalic and atraumatic.      Comments: Multiple scabbed lesions noted to the scalp.  No evidence for abscess or associated cellulitis.     Mouth/Throat:      Mouth: Mucous membranes are moist.      Pharynx: Oropharynx is clear.   Eyes:      Extraocular Movements: Extraocular movements intact.      Pupils: Pupils are equal, round, and reactive to light.   Cardiovascular:      Rate and Rhythm: Normal rate and regular rhythm.      Heart sounds: No murmur heard.  No friction rub. No gallop.    Pulmonary:      Effort: Pulmonary effort is normal. Tachypnea present.      Breath sounds: Examination of the right-upper field reveals wheezing. Examination of the left-upper field reveals wheezing. Examination of the right-middle field reveals wheezing. Wheezing present.   Musculoskeletal:      Cervical back: Normal range of motion and neck supple.      Right lower leg: No tenderness. No edema.      Left lower leg: No tenderness. No edema.   Skin:     General: Skin is warm and dry.      Capillary Refill: Capillary refill takes less than 2 seconds.   Neurological:      General: No focal deficit present.      Mental Status: She is alert and oriented to person, place, and time.   Psychiatric:         Mood and Affect: Mood normal.         Behavior: Behavior normal.         Procedures           ED Course  ED Course as of 12/09/21 0309   Wed Dec 08, 2021   1954 Delay in chemistry to due to machines being down per charge nurse [LB]   2011 INR(!!): >=8.00  Patient has no signs of bleeding [LB]   2018 Discussed with dr. Acosta regarding INR, will order 10mg vitamin k subQ [LB]  "  2119 Spoke with hospitalist [LB]      ED Course User Index  [LB] Ginna Alanis, APRN                /72 (BP Location: Right arm)   Pulse 83   Temp 97.9 °F (36.6 °C) (Oral)   Resp 21   Ht 165.1 cm (65\")   Wt 69.3 kg (152 lb 11.2 oz)   LMP  (LMP Unknown)   SpO2 95%   BMI 25.41 kg/m²   Labs Reviewed   COMPREHENSIVE METABOLIC PANEL - Abnormal; Notable for the following components:       Result Value    Glucose 155 (*)     BUN 31 (*)     Creatinine 1.01 (*)     Sodium 134 (*)     Potassium 3.3 (*)     Chloride 97 (*)     Calcium 7.0 (*)     Albumin 2.60 (*)     Alkaline Phosphatase 209 (*)     eGFR Non African Amer 55 (*)     BUN/Creatinine Ratio 30.7 (*)     All other components within normal limits    Narrative:     GFR Normal >60  Chronic Kidney Disease <60  Kidney Failure <15     URINALYSIS W/ CULTURE IF INDICATED - Abnormal; Notable for the following components:    Appearance, UA Cloudy (*)     Blood, UA Small (1+) (*)     Protein, UA 30 mg/dL (1+) (*)     All other components within normal limits   PROTIME-INR - Abnormal; Notable for the following components:    Protime 78.6 (*)     INR >=8.00 (*)     All other components within normal limits   CBC WITH AUTO DIFFERENTIAL - Abnormal; Notable for the following components:    RBC 3.67 (*)     Hemoglobin 9.6 (*)     Hematocrit 29.6 (*)     MCH 26.0 (*)     RDW 18.4 (*)     Neutrophil % 87.2 (*)     Lymphocyte % 8.0 (*)     Monocyte % 4.5 (*)     Eosinophil % 0.2 (*)     Lymphocytes, Absolute 0.30 (*)     All other components within normal limits   URINALYSIS, MICROSCOPIC ONLY - Abnormal; Notable for the following components:    RBC, UA 21-30 (*)     WBC, UA 0-2 (*)     All other components within normal limits   RESPIRATORY PANEL PCR W/ COVID-19 (SARS-COV-2) JAMAL/ROFL/ABENA/PAD/COR/MAD/MICHAEL IN-HOUSE, NP SWAB IN [facility], 3-4 HR TAT - Normal    Narrative:     In the setting of a positive respiratory panel with a viral infection PLUS a negative procalcitonin " without other underlying concern for bacterial infection, consider observing off antibiotics or discontinuation of antibiotics and continue supportive care. If the respiratory panel is positive for atypical bacterial infection (Bordetella pertussis, Chlamydophila pneumoniae, or Mycoplasma pneumoniae), consider antibiotic de-escalation to target atypical bacterial infection.   BNP (IN-HOUSE) - Normal    Narrative:     Among patients with dyspnea, NT-proBNP is highly sensitive for the detection of acute congestive heart failure. In addition NT-proBNP of <300 pg/ml effectively rules out acute congestive heart failure with 99% negative predictive value.    Results may be falsely decreased if patient taking Biotin.     TROPONIN (IN-HOUSE) - Normal    Narrative:     Troponin T Reference Range:  <= 0.03 ng/mL-   Negative for AMI  >0.03 ng/mL-     Abnormal for myocardial necrosis.  Clinicians would have to utilize clinical acumen, EKG, Troponin and serial changes to determine if it is an Acute Myocardial Infarction or myocardial injury due to an underlying chronic condition.       Results may be falsely decreased if patient taking Biotin.     POC LACTATE - Normal   BLOOD CULTURE   BLOOD CULTURE   RAINBOW DRAW    Narrative:     The following orders were created for panel order [facility] Draw.  Procedure                               Abnormality         Status                     ---------                               -----------         ------                     Green Top (Gel)[[id number]]                                  Final result               Lavender Top[[id number]]                                     Final result               Gold Top - SST[[id number]]                                   Final result               Light Blue Top[[id number]]                                   Final result                 Please view results for these tests on the individual orders.   PROTIME-INR   BASIC METABOLIC PANEL   CBC WITH AUTO  DIFFERENTIAL   MAGNESIUM   POC LACTATE   CBC AND DIFFERENTIAL    Narrative:     The following orders were created for panel order CBC & Differential.  Procedure                               Abnormality         Status                     ---------                               -----------         ------                     CBC Auto Differential[[id number]]        Abnormal            Final result                 Please view results for these tests on the individual orders.   GREEN TOP   LAVENDER TOP   GOLD TOP - SST   LIGHT BLUE TOP   CBC AND DIFFERENTIAL    Narrative:     The following orders were created for panel order CBC & Differential.  Procedure                               Abnormality         Status                     ---------                               -----------         ------                     CBC Auto Differential[[id number]]                                                         Please view results for these tests on the individual orders.     Medications   sodium chloride 0.9 % flush 10 mL (has no administration in time range)   cefTRIAXone (ROCEPHIN) 1 g in sodium chloride 0.9 % 100 mL IVPB (has no administration in time range)   azithromycin (ZITHROMAX) tablet 500 mg (has no administration in time range)   ipratropium-albuterol (DUO-NEB) nebulizer solution 3 mL (has no administration in time range)   albuterol sulfate HFA (PROVENTIL HFA;VENTOLIN HFA;PROAIR HFA) inhaler 2 puff (has no administration in time range)   sodium chloride 0.9 % flush 3 mL (3 mL Intravenous Given 12/8/21 2220)   sodium chloride 0.9 % flush 3-10 mL (has no administration in time range)   ondansetron (ZOFRAN) tablet 4 mg (has no administration in time range)     Or   ondansetron (ZOFRAN) injection 4 mg (has no administration in time range)   sodium chloride 0.9 % infusion (75 mL/hr Intravenous New Bag 12/8/21 2222)   acetaminophen (TYLENOL) tablet 650 mg (has no administration in time range)     Or   acetaminophen  (TYLENOL) 160 MG/5ML solution 650 mg (has no administration in time range)     Or   acetaminophen (TYLENOL) suppository 650 mg (has no administration in time range)   melatonin tablet 5 mg (has no administration in time range)   sennosides-docusate (PERICOLACE) 8.6-50 MG per tablet 2 tablet (2 tablets Oral Given 12/8/21 2223)     And   polyethylene glycol (MIRALAX) packet 17 g (has no administration in time range)     And   bisacodyl (DULCOLAX) EC tablet 5 mg (has no administration in time range)     And   bisacodyl (DULCOLAX) suppository 10 mg (has no administration in time range)   potassium chloride (K-DUR,KLOR-CON) CR tablet 40 mEq (40 mEq Oral Given 12/8/21 2257)     Or   potassium chloride (KLOR-CON) packet 40 mEq ( Oral Not Given:  See Alt 12/8/21 2257)     Or   potassium chloride 10 mEq in 100 mL IVPB ( Intravenous Not Given:  See Alt 12/8/21 2257)   calcium carbonate (TUMS) chewable tablet 500 mg (200 mg elemental) (has no administration in time range)   cetirizine (zyrTEC) tablet 10 mg (has no administration in time range)   calcium 500 mg vitamin D 5 mcg (200 UT) per tablet 1 tablet (has no administration in time range)   famotidine (PEPCID) tablet 40 mg (has no administration in time range)   folic acid (FOLVITE) tablet 1,000 mcg (has no administration in time range)   letrozole (FEMARA) tablet 2.5 mg (has no administration in time range)   magnesium oxide (MAG-OX) tablet 400 mg (has no administration in time range)   montelukast (SINGULAIR) tablet 10 mg (has no administration in time range)   guaiFENesin (MUCINEX) 12 hr tablet 1,200 mg (has no administration in time range)   oxyCODONE (ROXICODONE) immediate release tablet 20 mg (has no administration in time range)   oxyCODONE (ROXICODONE) immediate release tablet 20 mg (has no administration in time range)   potassium chloride (K-DUR,KLOR-CON) CR tablet 60 mEq (has no administration in time range)   potassium chloride (K-DUR,KLOR-CON) CR tablet 40 mEq  (has no administration in time range)   pregabalin (LYRICA) capsule 100 mg (has no administration in time range)   sertraline (ZOLOFT) tablet 50 mg (has no administration in time range)   temazepam (RESTORIL) capsule 30 mg (has no administration in time range)   triamterene-hydrochlorothiazide (MAXZIDE-25) 37.5-25 MG per tablet 1 tablet (has no administration in time range)   phosphorus (K PHOS NEUTRAL) tablet 1 tablet (has no administration in time range)   Pharmacy to dose warfarin (has no administration in time range)   albuterol sulfate HFA (PROVENTIL HFA;VENTOLIN HFA;PROAIR HFA) inhaler 2 puff (2 puffs Inhalation Given 12/8/21 1850)   cefTRIAXone (ROCEPHIN) 1 g in sodium chloride 0.9 % 100 mL IVPB (0 g Intravenous Stopped 12/8/21 2040)   azithromycin (ZITHROMAX) tablet 500 mg (500 mg Oral Given 12/8/21 2008)   phytonadione (AQUA-MEPHYTON, VITAMIN K) injection 10 mg (10 mg Subcutaneous Given 12/8/21 2048)     CT Head Without Contrast    Result Date: 12/8/2021  1.Intracranial calcifications which have been suggested and could reflect sequela to treated brain metastases. 2.Hyperostosis frontalis 3.Sclerotic area involving the clivus which could relate to known metastatic disease. There also are some sclerotic areas involving the calvarium near the convexity of the head.  Electronically Signed By-Last Urrutia MD On:12/8/2021 9:00 PM This report was finalized on [id number] by  Last Urrutia MD.    XR Chest 1 View    Result Date: 12/8/2021  1.Bilateral pulmonary infiltrates. Given history of malignancy this might relate to lymphangitic spread of tumor. An underlying inflammatory infectious process would be in the differential. Correlation with clinical findings suggested.  Electronically Signed By-Last Urrutia MD On:12/8/2021 7:02 PM This report was finalized on [id number] by  Last Urrutia MD.                                     MDM  Number of Diagnoses or Management Options  Abnormal head CT  Elevated  INR  Pneumonia due to infectious organism, unspecified laterality, unspecified part of lung  Shortness of breath  Diagnosis management comments: Appropriate PPE was worn during the duration of the care for this patient while in the emergency department per [facility] Policy  ----Differentials: Pneumonia, COVID-19, PE  This list is not all inclusive and does not constitute the entireity of considered causes.     ED  Course----Patient was brought back to the emergency department room for evaluation and placed on appropriate monitoring.      Patient had IV established and blood work obtained.  Radiology and imaging orders as above.  Chest x-ray reveals findings concerning for pneumonia.  BNP normal.  Troponin normal.  Lactate 1.7.  INR noted be greater than 8.  CT head negative.    Patient given vitamin K for INR.  She started on Rocephin and Zithromax.    ----Consults: Hospitalist    ----Disposition: I discussed with the patient their test results, work-up here in the emergency department, and need for admission and further evaluation. Patient is agreeable to the plan of care. At time of disposition patients VS are non concerning, and without acute distress.  Opportunity was provided for questions at the bedside, all questions and concerns were addressed.    Vital signs have  been reviewed. Patient is afebrile. Non toxic in appearance. Alert and oriented to person, place, time and situation.                               Amount and/or Complexity of Data Reviewed  Clinical lab tests: reviewed  Tests in the radiology section of CPT®: reviewed  Tests in the medicine section of CPT®: reviewed        Final diagnoses:   Pneumonia due to infectious organism, unspecified laterality, unspecified part of lung   Shortness of breath   Abnormal head CT   Elevated INR       ED Disposition  ED Disposition     ED Disposition Condition Comment    Decision to Admit  Level of Care: Med/Surg [1]   Diagnosis: Pneumonia due to  infectious organism, unspecified laterality, unspecified part of lung [[id number]]   Admitting Physician: KRYSTAL BURLESON [[id number]]   Attending Physician: KRYSTAL BURLESON [[id number]]   Certification: I Certify That Inpatient Hospital Services Are Medically Necessary For Greater Than 2 Midnights            No follow-up provider specified.       Medication List      No changes were made to your prescriptions during this visit.          Ginna Aalnis, IRENA  12/09/21 0308       Ginna Alanis, IRENA  12/09/21 0309

## 2021-12-09 NOTE — PLAN OF CARE
Goal Outcome Evaluation:  Plan of Care Reviewed With: patient           Outcome Summary:   Patient reports difficulty with solid food stating she will chew it but then it becomes rubbery and she is unable to initiate a swallow and ends up spitting out the food.  She states she has had decrease oral intake because of this.  She reports occasional globus sensation with pills and food but a liquid wash usually helps this.      Clinical swallow evaluation completed with trials of water by straw, meds whole in applesauce and peaches.  Patient took multiple consecutive sips drinking half the cup of water with no overt s/s of aspiration.  Patients vocal quality remains clear with no change in respirations.  Patient took large pills one at a time in applesauce and small pills multiple at a time whole in applesauce.  Patient exhibits timely oral transit.  She reports the pills went down fine and did not feel stuck in throat.  Patient independent in taking 2-3 diced peaches per bite.  Mastication is functional with no oral residue noted.  Throughout entire evaluation patient showed no overt s/s of aspiration.  Patient became mildly SOA however she was SOA prior to evaluation beginning as well.  It is recommended that patients diet be changed to mechanical ground with extra sauces and gravies and thin liquids.  Meds can be given whole in applesauce.  Discussed adding boost or ensure which is states she's been wanting to do at home.  ST is available for VFSS as indicated to further assess swallow function.

## 2021-12-09 NOTE — PLAN OF CARE
Goal Outcome Evaluation:      Pt seems to be miserable. Looks like she is short of breath and doing some deep fast breathing. O2 was checked and is good. Pt was found to have an infection of yeast in her port and ID wants it to be surgically removed. CT scan ordered. Made pt aware of pain meds on board if needed and stated she was not in any pain at this time. From home with daughter. VSS continue to monitor

## 2021-12-09 NOTE — CONSULTS
Infectious Diseases Consult Note    Referring Provider: Jaky Pettit MD    Reason for Consultation: Fungemia    Patient Care Team:  Noemy Queen MD as PCP - General (Family Medicine)  Cindy Ball MD as Consulting Physician (Hematology and Oncology)    Chief complaint   Shortness of breath    Subjective     History of present illness:      This is 66-year-old unfortunate female with past medical history significant for metastatic ovarian cancer.  The patient follows with the cancer center.  The patient received chemotherapy in the past and currently on oral chemotherapy.  Patient has a port in the left chest for some time.  The patient presented hospital with increased shortness of breath and currently on 5 L of oxygen.  Chest x-ray showed diffuse interstitial infiltrate and there was a concern for lymphangitic spread of malignancy.  The patient had 2 sets blood culture collected from the port and both of them are growing Candida.    Review of Systems   Review of Systems   Constitutional: Positive for fatigue.   HENT: Negative.    Eyes: Negative.    Respiratory: Positive for shortness of breath.    Cardiovascular: Negative.    Gastrointestinal: Negative.    Genitourinary: Negative.    Musculoskeletal: Negative.    Skin: Negative.    Neurological: Negative.    Hematological: Negative.    Psychiatric/Behavioral: Negative.        Medications  Medications Prior to Admission   Medication Sig Dispense Refill Last Dose   • folic acid (FOLVITE) 1 MG tablet Take 1 tablet by mouth Daily. 30 tablet 1    • acetaminophen (TYLENOL) 500 MG tablet Take 1,000 mg by mouth Every 6 (Six) Hours As Needed for Mild Pain .      • Calcium Carbonate 1500 (600 Ca) MG tablet Take 600 mg by mouth 2 (Two) Times a Day.      • calcium carbonate-vitamin d 600-400 MG-UNIT per tablet Take 1 tablet by mouth 2 (Two) Times a Day. 60 tablet 3    • cetirizine (zyrTEC) 10 MG tablet Take 1 tablet by mouth Every Night. 30 tablet 0    •  Combivent Respimat  MCG/ACT inhaler INHALE 1 PUFF FOUR TIMES DAILY AS NEEDED FOR SHORTNESS OF BREATH      • cyanocobalamin 1000 MCG/ML injection Inject 1,000 mcg into the appropriate muscle as directed by prescriber Every 28 (Twenty-Eight) Days.      • famotidine (PEPCID) 40 MG tablet Take 1 tablet by mouth Every Morning Before Breakfast. 90 tablet 1    • HYDROcodone-homatropine (HYCODAN) 5-1.5 MG/5ML syrup Take 5 mL by mouth Every 8 (Eight) Hours As Needed for Cough. 473 mL 0    • letrozole (FEMARA) 2.5 MG tablet Take 1 tablet by mouth Daily. 30 tablet 6    • MAGnesium-Oxide 400 (241.3 Mg) MG tablet tablet Take 1 tablet by mouth 3 (Three) Times a Day. 30 each 3    • Misc. Devices (Commode Bedside) misc 1 Device Daily. 1 each 0    • Misc. Devices (Roller Walker) misc 1 Device Daily. 1 each 0    • montelukast (SINGULAIR) 10 MG tablet TAKE 1 TABLET BY MOUTH EVERY NIGHT 30 tablet 2    • Mucus Relief  MG 12 hr tablet TAKE TWO TABLETS BY MOUTH TWICE A DAY 60 tablet 1    • ondansetron (ZOFRAN) 8 MG tablet       • oxyCODONE (ROXICODONE) 20 MG tablet Take 1 tablet by mouth Every 4 (Four) Hours As Needed for Moderate Pain . 180 tablet 0    • oxyCODONE (ROXICODONE) 20 MG tablet Take 1 to 1.5 tabs every 4 hours as needed for moderate pain. 270 tablet 0    • phosphorus (K PHOS NEUTRAL) 155-852-130 MG tablet Take 1 tablet by mouth 3 (Three) Times a Day. 90 tablet 3    • potassium chloride (K-DUR,KLOR-CON) 20 MEQ CR tablet Take 60 mEq by mouth Daily.      • potassium chloride (K-DUR,KLOR-CON) 20 MEQ CR tablet Take 2 tablets by mouth Every Night. 60 tablet 3    • pregabalin (LYRICA) 100 MG capsule Take 1 capsule by mouth 3 (Three) Times a Day. 15 capsule 0    • sertraline (ZOLOFT) 50 MG tablet Take 50 mg by mouth Every Night.      • temazepam (RESTORIL) 30 MG capsule TAKE 1 CAPSULE BY MOUTH AT NIGHT AS NEEDED FOR SLEEP 30 capsule 1    • triamterene-hydrochlorothiazide (DYAZIDE) 37.5-25 MG per capsule Take 1 capsule by  mouth Daily. 90 capsule 1    • warfarin (COUMADIN) 4 MG tablet Take as directed 30 tablet 3        History  Past Medical History:   Diagnosis Date   • Anemia 2013   • Cervical disc disorder 2013    Herniated disc, pinched nerve   • Clotting disorder (HCC) 2013    Low platelets from chemo   • Colon polyp 2013   • Deep vein thrombosis (HCC) 2013   • Diverticulosis 2013   • GERD (gastroesophageal reflux disease) 2016   • HL (hearing loss) 2016    I need hearing aids   • Hyperlipidemia 2013    Need my cholesterol rechecked   • Hypertension    • Joint pain 2013    Shoulder pain, torn rotator cuff   • Low back pain 2013   • Neuromuscular disorder (HCC) 2015    Shingles, pinched nerves in my neck   • Osteopenia 2014   • Osteoporosis    • Ovarian cancer (HCC)     ovarian--  spread to lungs 10/2020   • Ovarian cancer on left (HCC) 1995   • Pneumonia 2010    Have had it several times   • Spinal stenosis 2013    Cervical   • Urinary tract infection 2013    Have had several utis     Past Surgical History:   Procedure Laterality Date   • BRONCHOSCOPY N/A 2/10/2021    Procedure: BRONCHOSCOPY with bronchioalveolar lavage;  Surgeon: Jerica Esparza MD;  Location: [facility] ENDOSCOPY;  Service: Pulmonary;  Laterality: N/A;  post op:right lobe pneumonia   • BRONCHOSCOPY N/A 6/15/2021    Procedure: BRONCHOSCOPY with bronchoalveolar lavage;  Surgeon: Jerica Esparza MD;  Location: [facility] ENDOSCOPY;  Service: Pulmonary;  Laterality: N/A;  lung cancer;pneumonia   • CATARACT EXTRACTION     • COLON SURGERY     • COLONOSCOPY  05/26/2018   • EXPLORATORY LAPAROTOMY     • HERNIA REPAIR     • HYSTERECTOMY     • OOPHORECTOMY         Family History  Family History   Problem Relation Age of Onset   • Hypertension Mother    • Cancer Father    • Hypertension Father    • Hypertension Sister    • Hypertension Brother    • Stroke Brother        Social History   reports that she has never smoked. She has never used smokeless tobacco. She reports that she does not  drink alcohol and does not use drugs.    Allergies  Morphine, Penicillin v potassium, Sulfa antibiotics, Levaquin [levofloxacin], and Amoxicillin    Objective     Vital Signs   Vital Signs (last 24 hours)       12/08 0700  12/09 0659 12/09 0700 12/09 1531   Most Recent      Temp (°F) 97.9 -  98      98.4     98.4 (36.9) 12/09 0848    Heart Rate 78 -  97      87     87 12/09 0848    Resp 20 -  22      20     20 12/09 0848    /65 -  133/77      134/84     134/84 12/09 0848    SpO2 (%) 95 -  99      98     98 12/09 0848          Physical Exam:  Physical Exam  Vitals and nursing note reviewed.   Constitutional:       Appearance: She is well-developed.   HENT:      Head: Normocephalic and atraumatic.   Eyes:      Pupils: Pupils are equal, round, and reactive to light.   Cardiovascular:      Rate and Rhythm: Normal rate and regular rhythm.      Heart sounds: Normal heart sounds.   Pulmonary:      Effort: Pulmonary effort is normal. No respiratory distress.      Breath sounds: Rales present. No wheezing.   Abdominal:      General: Bowel sounds are normal. There is no distension.      Palpations: Abdomen is soft. There is no mass.      Tenderness: There is no abdominal tenderness. There is no guarding or rebound.   Musculoskeletal:         General: No deformity. Normal range of motion.      Cervical back: Normal range of motion and neck supple.   Skin:     General: Skin is warm.      Findings: No erythema or rash.      Comments: Multiple skin nodules noticed on the head and 1 large nodule in the left anterior chest   Neurological:      Mental Status: She is alert. She is disoriented.      Cranial Nerves: No cranial nerve deficit.         Microbiology  Microbiology Results (last 10 days)     Procedure Component Value - Date/Time    Blood Culture - Blood, Chest, Left [[id number]]  (Abnormal) Collected: 12/08/21 1946    Lab Status: Preliminary result Specimen: Blood from Chest, Left Updated: 12/09/21 1503     Blood  Culture Abnormal Stain     Gram Stain Aerobic Bottle Yeast    Blood Culture - Blood, Chest, Left [[id number]]  (Abnormal) Collected: 12/08/21 1910    Lab Status: Preliminary result Specimen: Blood from Chest, Left Updated: 12/09/21 1422     Blood Culture Abnormal Stain     Gram Stain Aerobic Bottle Yeast    Respiratory Panel PCR w/COVID-19(SARS-CoV-2) JAMAL/ROLF/ABENA/PAD/COR/MAD/MICHAEL In-House, NP Swab in UTM/VTM, 3-4 HR TAT - Swab, Nasopharynx [[id number]]  (Normal) Collected: 12/08/21 1910    Lab Status: Final result Specimen: Swab from Nasopharynx Updated: 12/08/21 2029     ADENOVIRUS, PCR Not Detected     Coronavirus 229E Not Detected     Coronavirus HKU1 Not Detected     Coronavirus NL63 Not Detected     Coronavirus OC43 Not Detected     COVID19 Not Detected     Human Metapneumovirus Not Detected     Human Rhinovirus/Enterovirus Not Detected     Influenza A PCR Not Detected     Influenza B PCR Not Detected     Parainfluenza Virus 1 Not Detected     Parainfluenza Virus 2 Not Detected     Parainfluenza Virus 3 Not Detected     Parainfluenza Virus 4 Not Detected     RSV, PCR Not Detected     Bordetella pertussis pcr Not Detected     Bordetella parapertussis PCR Not Detected     Chlamydophila pneumoniae PCR Not Detected     Mycoplasma pneumo by PCR Not Detected    Narrative:      In the setting of a positive respiratory panel with a viral infection PLUS a negative procalcitonin without other underlying concern for bacterial infection, consider observing off antibiotics or discontinuation of antibiotics and continue supportive care. If the respiratory panel is positive for atypical bacterial infection (Bordetella pertussis, Chlamydophila pneumoniae, or Mycoplasma pneumoniae), consider antibiotic de-escalation to target atypical bacterial infection.    Blood Culture ID, PCR - Blood, Chest, Left [[id number]]  (Abnormal) Collected: 12/08/21 1910    Lab Status: Final result Specimen: Blood from Chest, Left Updated: 12/09/21 1422      BCID, PCR Candida albicans. Identification by BCID2 PCR     BOTTLE TYPE Aerobic Bottle    Narrative:      Infectious disease consultation is highly recommended to rule out distant foci of infection.          Laboratory  Results from last 7 days   Lab Units 12/09/21  0529   WBC 10*3/mm3 5.90   HEMOGLOBIN g/dL 8.8*   HEMATOCRIT % 27.4*   PLATELETS 10*3/mm3 232     Results from last 7 days   Lab Units 12/09/21  0529   SODIUM mmol/L 134*   POTASSIUM mmol/L 4.1   CHLORIDE mmol/L 99   CO2 mmol/L 26.0   BUN mg/dL 29*   CREATININE mg/dL 0.95   GLUCOSE mg/dL 120*   CALCIUM mg/dL 6.8*     Results from last 7 days   Lab Units 12/09/21  0529   SODIUM mmol/L 134*   POTASSIUM mmol/L 4.1   CHLORIDE mmol/L 99   CO2 mmol/L 26.0   BUN mg/dL 29*   CREATININE mg/dL 0.95   GLUCOSE mg/dL 120*   CALCIUM mg/dL 6.8*                   Radiology  Imaging Results (Last 72 Hours)     Procedure Component Value Units Date/Time    CT Head Without Contrast [[id number]] Collected: 12/08/21 2055     Updated: 12/08/21 2102    Narrative:         DATE OF EXAM:  12/8/2021 8:30 PM     PROCEDURE:   CT HEAD WO CONTRAST-     INDICATIONS:   Mental status change, unknown cause history of brain metastases. Stage  IV ovarian cancer.     COMPARISON:  08/25/2021     TECHNIQUE:   Routine transaxial cuts were obtained through the head without the  administration of contrast. Automated exposure control and iterative  reconstruction methods were used.      FINDINGS:  The ventricles are not dilated. There our calcific densities in the left  parietal area. There is a new small calcific density along the wall of  the more anterior right lateral ventricle. There is a calcification in  the left cerebellar area which has been noted. There is no unusual  extra-axial fluid collection or significant change to the white matter.  There is hyperostosis frontalis. There is a sclerotic area involving the  clivus on the left that could relate to metastasis in this area and  has  been noted. There additionally are some sclerotic areas involving the  calvarium near the convexity of the head.       Impression:      1.Intracranial calcifications which have been suggested and could  reflect sequela to treated brain metastases.  2.Hyperostosis frontalis  3.Sclerotic area involving the clivus which could relate to known  metastatic disease. There also are some sclerotic areas involving the  calvarium near the convexity of the head.     Electronically Signed By-Last Urrutia MD On:12/8/2021 9:00 PM  This report was finalized on [id number] by  Last Urrutia MD.    XR Chest 1 View [[id number]] Collected: 12/08/21 1901     Updated: 12/08/21 1904    Narrative:      DATE OF EXAM:  12/8/2021 6:40 PM     PROCEDURE:  XR CHEST 1 VW-     INDICATIONS:  Simple Sepsis Protocol     COMPARISON:  08/25/2021     TECHNIQUE:   Single radiographic AP view of the chest was obtained.     FINDINGS:  There is a port catheter noted with its tip in the superior vena cava.  The heart is not definitely enlarged. There are diffuse infiltrates  within the lungs has been suggested. Given history of ovarian cancer  this may be malignant in nature or secondary to inflammatory infectious  process and should be correlated clinically. There are no large pleural  effusions.        Impression:      1.Bilateral pulmonary infiltrates. Given history of malignancy this  might relate to lymphangitic spread of tumor. An underlying inflammatory  infectious process would be in the differential. Correlation with  clinical findings suggested.     Electronically Signed By-Last Urrutia MD On:12/8/2021 7:02 PM  This report was finalized on [id number] by  Last Urrutia MD.          Cardiology      Results Review:  I have reviewed all clinical data, test, lab, and imaging results.       Schedule Meds  azithromycin, 500 mg, Oral, Q24H  calcium 500 mg vitamin D 5 mcg (200 UT), 1 tablet, Oral, BID  cefTRIAXone, 1 g, Intravenous,  Q24H  cetirizine, 10 mg, Oral, Nightly  erythromycin, , Right Eye, 4x Daily  famotidine, 40 mg, Oral, QAM AC  folic acid, 1,000 mcg, Oral, Daily  guaiFENesin, 1,200 mg, Oral, BID  letrozole, 2.5 mg, Oral, Daily  magnesium oxide, 400 mg, Oral, TID  micafungin (MYCAMINE) IV, 150 mg, Intravenous, Q24H  montelukast, 10 mg, Oral, Nightly  phosphorus, 250 mg, Oral, TID  potassium chloride, 20 mEq, Oral, BID With Meals  pregabalin, 100 mg, Oral, TID  senna-docusate sodium, 2 tablet, Oral, BID  sertraline, 50 mg, Oral, Nightly  sodium chloride, 3 mL, Intravenous, Q12H  triamterene-hydrochlorothiazide, 1 tablet, Oral, Daily  warfarin, 2 mg, Oral, Once        Infusion Meds  Pharmacy to dose warfarin,   sodium chloride, 75 mL/hr, Last Rate: 75 mL/hr (12/08/21 2222)        PRN Meds  •  acetaminophen **OR** acetaminophen **OR** acetaminophen  •  albuterol sulfate HFA  •  senna-docusate sodium **AND** polyethylene glycol **AND** bisacodyl **AND** bisacodyl  •  ipratropium-albuterol  •  melatonin  •  ondansetron **OR** ondansetron  •  oxyCODONE  •  Pharmacy to dose warfarin  •  potassium chloride **OR** potassium chloride **OR** potassium chloride  •  sodium chloride  •  sodium chloride  •  temazepam      Assessment/Plan       Assessment    Candidemia, PCR detected Candida albicans.  The most likely source of candidemia is infected port    Dyspnea and hypoxia.  Chest x-ray showed interstitial lung infiltrates.  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  Infection is a possibility particularly atypical infection    Recurrent ovarian cancer with metastasis to the brain,: Abdominal wall and pulmonary involvement.  Patient also have multiple skin nodules concerning for metastasis.  Patient was on oral chemotherapy prior to admission    The patient is s/p port placement in the past    Plan    Continue IV micafungin but increase dose to 150 mg daily  Consult general surgery for port removal  Repeat 1 set of blood  culture  Send port for culture after removing it  The patient is currently on antibiotics, may discontinue antibiotics depending on the CT scan of the chest findings  Request CT scan of the chest without contrast  A.m. labs  Long-term prognosis is poor secondary to advanced and metastatic ovarian cancer    Cony Shelby MD  12/09/21  15:31 EST    Note is dictated utilizing voice recognition software/Dragon

## 2021-12-09 NOTE — THERAPY EVALUATION
Acute Care - Speech Language Pathology   Swallow Initial Evaluation  [place]     Patient Name: Tahira Zimmerman  : 1955  MRN: [id number]  Today's Date: 2021               Admit Date: 2021    Visit Dx:     ICD-10-CM ICD-9-CM   1. Pneumonia due to infectious organism, unspecified laterality, unspecified part of lung  J18.9 486   2. Shortness of breath  R06.02 786.05   3. Abnormal head CT  R93.0 793.0   4. Elevated INR  R79.1 790.92     Patient Active Problem List   Diagnosis   • Malignant neoplasm of right ovary (HCC)   • Hypomagnesemia   • Left upper extremity deep vein thrombosis (HCC)   • Pernicious anemia   • Malabsorption due to intolerance, not elsewhere classified   • MELANI (iron deficiency anemia)   • Pancytopenia due to antineoplastic chemotherapy (HCC)   • Encounter for antineoplastic chemotherapy   • Long term (current) use of anticoagulants   • Monitoring for long-term anticoagulant use   • Leg pain, bilateral   • Osteoarthritis of cervical spine without myelopathy   • Other long term (current) drug therapy   • Pain of metastatic malignancy   • Hyperlipidemia   • Essential hypertension   • Antineoplastic chemotherapy induced anemia   • Cervical disc disorder with radiculopathy   • Restless legs syndrome   • Dysuria   • Fibromyositis   • Port-A-Cath in place   • Encounter for medication management   • Chronic headaches   • Welcome to Medicare preventive visit   • Encounter for screening mammogram for breast cancer   • Postmenopausal   • Screening for diabetes mellitus   • Screening, ischemic heart disease   • Encounter for hepatitis C screening test for low risk patient   • Need for immunization against influenza   • Coumadin toxicity   • Anxiety   • Brain metastases (HCC)   • Deep vein thrombosis (HCC)   • Family history of cerebrovascular accident (CVA)   • Family history of diabetes mellitus   • Insomnia   • Lung nodule   • Mass of skin   • Osteoporosis   • Shingles   • History of DVT  (deep vein thrombosis)   • Depression   • Anemia   • Mastoiditis of left side   • Encounter for care related to vascular access port   • Iron deficiency anemia   • Malabsorption of iron   • Neck pain   • Bone metastases (HCC)   • Pneumonia of right lower lobe due to infectious organism   • Nasal congestion with rhinorrhea   • Cough   • Chest pain   • Elevated international normalized ratio (INR) due to prior anticoagulant medication ingestion   • Mucus plugging of bronchi   • Encephalopathy acute   • Hypotension   • Pneumonia due to COVID-19 virus   • Cytokine release syndrome, grade 2   • Pneumonia due to infectious organism   • Coumadin toxicity   • Hypokalemia   • AMS (altered mental status)     Past Medical History:   Diagnosis Date   • Anemia 2013   • Cervical disc disorder 2013    Herniated disc, pinched nerve   • Clotting disorder (HCC) 2013    Low platelets from chemo   • Colon polyp 2013   • Deep vein thrombosis (HCC) 2013   • Diverticulosis 2013   • GERD (gastroesophageal reflux disease) 2016   • HL (hearing loss) 2016    I need hearing aids   • Hyperlipidemia 2013    Need my cholesterol rechecked   • Hypertension    • Joint pain 2013    Shoulder pain, torn rotator cuff   • Low back pain 2013   • Neuromuscular disorder (HCC) 2015    Shingles, pinched nerves in my neck   • Osteopenia 2014   • Osteoporosis    • Ovarian cancer (HCC)     ovarian--  spread to lungs 10/2020   • Ovarian cancer on left (HCC) 1995   • Pneumonia 2010    Have had it several times   • Spinal stenosis 2013    Cervical   • Urinary tract infection 2013    Have had several utis     Past Surgical History:   Procedure Laterality Date   • BRONCHOSCOPY N/A 2/10/2021    Procedure: BRONCHOSCOPY with bronchioalveolar lavage;  Surgeon: Jerica Esparza MD;  Location: [facility] ENDOSCOPY;  Service: Pulmonary;  Laterality: N/A;  post op:right lobe pneumonia   • BRONCHOSCOPY N/A 6/15/2021    Procedure: BRONCHOSCOPY with bronchoalveolar lavage;  Surgeon:  Jerica Esparza MD;  Location: [facility] ENDOSCOPY;  Service: Pulmonary;  Laterality: N/A;  lung cancer;pneumonia   • CATARACT EXTRACTION     • COLON SURGERY     • COLONOSCOPY  05/26/2018   • EXPLORATORY LAPAROTOMY     • HERNIA REPAIR     • HYSTERECTOMY     • OOPHORECTOMY         SLP Recommendation and Plan  SLP Swallowing Diagnosis: functional oral phase, functional pharyngeal phase (12/09/21 1500)  SLP Diet Recommendation: ground, thin liquids (12/09/21 1500)  Recommended Precautions and Strategies: upright posture during/after eating, small bites of food and sips of liquid (12/09/21 1500)  SLP Rec. for Method of Medication Administration: meds whole, with pudding or applesauce (12/09/21 1500)     Monitor for Signs of Aspiration: yes, notify SLP if any concerns, cough (12/09/21 1500)  Recommended Diagnostics: [place] [place] (12/09/21 1500)  Swallow Criteria for Skilled Therapeutic Interventions Met: demonstrates skilled criteria (12/09/21 1500)     Rehab Potential/Prognosis, Swallowing: good, to achieve stated therapy goals (12/09/21 1500)  Therapy Frequency (Swallow): PRN (12/09/21 1500)  Predicted Duration Therapy Intervention (Days): until discharge (12/09/21 1500)                                SWALLOW EVALUATION (last 72 hours)     SLP Adult Swallow Evaluation     Row Name 12/09/21 1500          Document Type evaluation  -MM    Subjective Information no complaints  -MM    Patient Observations alert; cooperative; agree to therapy  -MM    Patient/Family/Caregiver Comments/Observations Patient reports that whenever she is chewing food it becomes rubbery and she is unable to swallow it so she spits it back out.  Patient reports decrease intake because of this.  She states her mouth always feels dry.  She reports food and pills sticking in her throat but if she does take a drink usually this helps.  No reports of getting choked or strangled.  -MM    Patient Effort good  -MM               Patient Profile Reviewed  yes  -MM    Pertinent History Of Current Problem 66 y.o. female who presented to [facility] on 12/8/2021 complaining of increased shortness of air past several days.  She reports cough productive of yellowish-whitish sputum.  She denies any fever chills or diaphoresis.  She reports using oxygen at home at 6 to 8 L.  She is currently stable on same.  He has a past medical history of ovarian cancer initially diagnosed in 2013.  He has metastasis to the brain, bone, and various abdominal organs.  CXR shows bilateral pulomonary infiltrates.  Given history of malignancy this might relate to lymphangitic spread of tumor. An underlying inflammatory infectious process would be in the differential. Correlation with clinical findings suggested.  -MM    Current Method of Nutrition regular textures; thin liquids  -MM    Prior Level of Function-Swallowing no diet consistency restrictions  -MM    Plans/Goals Discussed with patient  -MM    Barriers to Rehab none identified  -MM               Dentition Assessment upper dentures/partial in place; lower dentures/partial in place  -MM    Secretion Management WNL/WFL  -MM    Mucosal Quality moist, healthy  -MM    Volitional Swallow WFL  -MM    Volitional Cough WFL  -MM               Oral Motor General Assessment WFL  -MM               Respiratory Support Currently in Use nasal cannula  -MM    Eating/Swallowing Skills self-fed  -MM    Positioning During Eating upright 90 degree; upright in bed  -MM    Utensils Used spoon; straw  -MM    Consistencies Trialed chopped; pureed; thin liquids  -MM               Oral Prep Phase WFL  -MM    Oral Transit WFL  -MM    Oral Residue WFL  -MM    Pharyngeal Phase no overt signs/symptoms of pharyngeal impairment  -MM    Clinical Swallow Evaluation Summary Clinical swallow evaluation completed with trials of water by straw, meds whole in applesauce and peaches.  Patient took multiple consecutive sips drinking half the cup of water with no  overt s/s of aspiration.  Patients vocal quality remains clear with no change in respirations.  Patient took large pills one at a time in applesauce and small pills multiple at a time whole in applesauce.  Patient exhibits timely oral transit.  She reports the pills went down fine and did not feel stuck in throat.  Patient independent in taking 2-3 diced peaches per bite.  Mastication is functional with no oral residue noted.  Throughout entire evaluation patient showed no overt s/s of aspiration.  Patient became mildly SOA however she was SOA prior to evaluation beginning as well.  -MM               SLP Swallowing Diagnosis functional oral phase; functional pharyngeal phase  -MM    Functional Impact risk of aspiration/pneumonia; risk of malnutrition  -MM    Rehab Potential/Prognosis, Swallowing good, to achieve stated therapy goals  -MM    Swallow Criteria for Skilled Therapeutic Interventions Met demonstrates skilled criteria  -MM               Therapy Frequency (Swallow) PRN  -MM    Predicted Duration Therapy Intervention (Days) until discharge  -MM    SLP Diet Recommendation ground; thin liquids  -MM    Recommended Diagnostics reassess via VFSS ([facility])  -MM    Recommended Precautions and Strategies upright posture during/after eating; small bites of food and sips of liquid  -MM    Oral Care Recommendations Oral Care BID/PRN  -MM    SLP Rec. for Method of Medication Administration meds whole; with pudding or applesauce  -MM    Monitor for Signs of Aspiration yes; notify SLP if any concerns; cough  -MM               Oral Nutrition/Hydration Goal Selection (SLP) oral nutrition/hydration, SLP goal 1; oral nutrition/hydration, SLP goal 2  -MM               Oral Nutrition/Hydration Goal 1, SLP Patient will be seen at a meal within 24-48 hours to assess tolerance of current diet with further recommendations to be given as indicated.  -MM    Time Frame (Oral Nutrition/Hydration Goal 1, SLP) 2 days  -MM               Oral  Nutrition/Hydration Goal 2, SLP Patient will tolerate safest and least restrictive diet with no complications from aspiration.  -MM    Time Frame (Oral Nutrition/Hydration Goal 2, SLP) by discharge  -MM          User Key  (r) = Recorded By, (t) = Taken By, (c) = Cosigned By    Initials Name Effective Dates    Delores Vasquez SLP 06/16/21 -                 EDUCATION  The patient has been educated in the following areas:   Modified Diet Instruction.        SLP GOALS     Row Name 12/09/21 1500          Oral Nutrition/Hydration Goal 1, SLP Patient will be seen at a meal within 24-48 hours to assess tolerance of current diet with further recommendations to be given as indicated.  -MM    Time Frame (Oral Nutrition/Hydration Goal 1, SLP) 2 days  -MM               Oral Nutrition/Hydration Goal 2, SLP Patient will tolerate safest and least restrictive diet with no complications from aspiration.  -MM    Time Frame (Oral Nutrition/Hydration Goal 2, SLP) by discharge  -MM          User Key  (r) = Recorded By, (t) = Taken By, (c) = Cosigned By    Initials Name Provider Type    Delores Vasquez SLP Speech and Language Pathologist                   Time Calculation:                BRANDON De La O  12/9/2021

## 2021-12-09 NOTE — PROGRESS NOTES
[facility] Medicine Services Daily Progress Note    Patient Name: Tahira Zimmerman  : 1955  MRN: [id number]  Primary Care Physician:  Noemy Queen MD  Date of admission: 2021      Subjective      Chief Complaint: Cough with shortness of breath and pneumonia.      Review of Systems   Constitutional: Positive for decreased appetite, malaise/fatigue and weight loss.   HENT: Negative.    Eyes: Negative.    Cardiovascular: Negative.    Respiratory: Positive for cough and sputum production.    Endocrine: Negative.    Hematologic/Lymphatic: Negative.    Skin: Negative.    Musculoskeletal: Positive for muscle weakness.   Gastrointestinal: Positive for dysphagia.   Genitourinary: Negative.    Neurological: Negative.    Psychiatric/Behavioral: Negative.    Allergic/Immunologic: Negative.    All other systems reviewed and are negative.         Objective      Vitals:   Temp:  [97.9 °F (36.6 °C)-98.4 °F (36.9 °C)] 98.4 °F (36.9 °C)  Heart Rate:  [78-97] 87  Resp:  [20-22] 20  BP: (107-134)/(65-84) 134/84  Flow (L/min):  [4-8] 4.5    Physical Exam  Vitals and nursing note reviewed.   Constitutional:       General: She is not in acute distress.     Appearance: Normal appearance. She is well-developed. She is not ill-appearing, toxic-appearing or diaphoretic.      Comments: The patient is very ill in her appearance. She was coughing and somewhat choking at the time of my visit. She has been seen by speech and her diet has been adjusted.   HENT:      Head: Normocephalic and atraumatic.      Right Ear: Ear canal and external ear normal.      Left Ear: Ear canal and external ear normal.      Nose: Nose normal. No congestion or rhinorrhea.      Mouth/Throat:      Mouth: Mucous membranes are moist.      Pharynx: No oropharyngeal exudate.   Eyes:      General: No scleral icterus.        Right eye: No discharge.         Left eye: No discharge.      Extraocular Movements: Extraocular movements  intact.      Conjunctiva/sclera: Conjunctivae normal.      Pupils: Pupils are equal, round, and reactive to light.   Neck:      Thyroid: No thyromegaly.      Vascular: No carotid bruit or JVD.      Trachea: No tracheal deviation.   Cardiovascular:      Rate and Rhythm: Normal rate and regular rhythm.      Pulses: Normal pulses.      Heart sounds: Normal heart sounds. No murmur heard.  No friction rub. No gallop.    Pulmonary:      Effort: Pulmonary effort is normal. No respiratory distress.      Breath sounds: Normal breath sounds. No stridor. No wheezing, rhonchi or rales.   Chest:      Chest wall: No tenderness.   Abdominal:      General: Bowel sounds are normal. There is no distension.      Palpations: Abdomen is soft. There is no mass.      Tenderness: There is no abdominal tenderness. There is no guarding or rebound.      Hernia: No hernia is present.   Musculoskeletal:         General: No swelling, tenderness, deformity or signs of injury. Normal range of motion.      Cervical back: Normal range of motion and neck supple. No rigidity. No muscular tenderness.      Right lower leg: No edema.      Left lower leg: No edema.   Lymphadenopathy:      Cervical: No cervical adenopathy.   Skin:     General: Skin is warm and dry.      Coloration: Skin is not jaundiced or pale.      Findings: No bruising, erythema or rash.   Neurological:      General: No focal deficit present.      Mental Status: She is alert and oriented to person, place, and time. Mental status is at baseline.      Cranial Nerves: No cranial nerve deficit.      Sensory: No sensory deficit.      Motor: No weakness or abnormal muscle tone.      Coordination: Coordination normal.   Psychiatric:         Mood and Affect: Mood normal.         Behavior: Behavior normal.         Thought Content: Thought content normal.         Judgment: Judgment normal.             Result Review    Result Review:  I have personally reviewed the results from the time of this  admission to 12/9/2021 17:32 EST and agree with these findings:  [x]  Laboratory  [x]  Microbiology  [x]  Radiology  []  EKG/Telemetry   []  Cardiology/Vascular   []  Pathology  []  Old records  [x]  Other:  Most notable findings include: Speech therapy report          Assessment/Plan      Brief Patient Summary:  Tahira Zimmerman is a 66 y.o. female who has numerous medical problems which include metastatic ovarian cancer which is widely metastatic, hypertension, coagulopathy related to Coumadin dosing with INR of eight, history of DVTs. The patient presented for increasing issues with swallowing.      azithromycin, 500 mg, Oral, Q24H  calcium 500 mg vitamin D 5 mcg (200 UT), 1 tablet, Oral, BID  cefTRIAXone, 1 g, Intravenous, Q24H  cetirizine, 10 mg, Oral, Nightly  erythromycin, , Right Eye, 4x Daily  famotidine, 40 mg, Oral, QAM AC  folic acid, 1,000 mcg, Oral, Daily  guaiFENesin, 1,200 mg, Oral, BID  letrozole, 2.5 mg, Oral, Daily  magnesium oxide, 400 mg, Oral, TID  micafungin (MYCAMINE) IV, 150 mg, Intravenous, Q24H  montelukast, 10 mg, Oral, Nightly  phosphorus, 250 mg, Oral, TID  potassium chloride, 20 mEq, Oral, BID With Meals  pregabalin, 100 mg, Oral, TID  senna-docusate sodium, 2 tablet, Oral, BID  sertraline, 50 mg, Oral, Nightly  sodium chloride, 3 mL, Intravenous, Q12H  triamterene-hydrochlorothiazide, 1 tablet, Oral, Daily  warfarin, 2 mg, Oral, Once       Pharmacy to dose warfarin,   sodium chloride, 75 mL/hr, Last Rate: 75 mL/hr (12/08/21 2222)         Active Hospital Problems:  Active Hospital Problems    Diagnosis    • **Port-A-Cath in place    • Pneumonia due to infectious organism    • Coumadin toxicity    • Hypokalemia    • AMS (altered mental status)    • Bone metastases (HCC)    • Depression    • Brain metastases (HCC)    • Malignant neoplasm of right ovary (HCC)      Plan:   Pneumonia due to infectious organism, COVID-19 negative, chest x-ray per radiology shows bilateral pulmonary  infiltrates, WBC not elevated, continue IV ceftriaxone and IV azithromycin until blood cultures resulted, DuoNebs every 4 hours as needed and albuterol every 6 hours as needed, stable on home 6 L oxygen via nasal cannula. Will follow closely to see if she improves on the ceftriaxone. Discontinue the a azithromycin as her viral panel was negative. She is allergic to penicillins therefore will not change her to something like Zosyn which might help if this were aspiration. We will follow her closely and if need be start Cleocin.     Coumadin toxicity INR greater than 8 with past medical history of DVT, hold home Coumadin for now given vitamin K in ED, INR daily pharmacy to dose warfarin to INR. Her INR is down to 1.42 at 3:49 this afternoon. We will continue to follow and restart with pharmacy's help.     Altered mental status?  Patient appears awake and alert and answers appropriately, she denies confusion, CT head negative for acute changes, mets to brain known     Hypokalemia mild potassium 3.3, potassium protocol in place repeat BNP in a.m. magnesium in a.m.     Ovarian  cancer with mets to bone brain and abdominal, followed by  of oncology, consulted Home meds unverified at this time reorder pending verification pharmacy     Hypertension, controlled on home meds unverified at this time reorder pending verification from pharmacy monitor BP     Anxiety depression, home meds unverified at this time reorder pending verification from pharmacy     Poor appetite, weight loss likely secondary to malignancy, boost 3 times daily ordered, dietary consulted     The patient also has dysphagia and was choking at the time of my visit. She was at that time eating/drinking liquids. I did ask speech therapy to reevaluate her diet as it did appear that there was more of a problem than might have previously been thought.    DVT prophylaxis:  Medical DVT prophylaxis orders are present.    CODE STATUS:    Level Of Support  Discussed With: Patient  Code Status (Patient has no pulse and is not breathing): No CPR (Do Not Attempt to Resuscitate)  Medical Interventions (Patient has pulse or is breathing): Full Support      Disposition:  I expect patient to be discharged in 2 to 3 days if she improves.    This patient has been examined wearing appropriate Personal Protective Equipment and discussed with hospital infection control department. 12/09/21      Electronically signed by Jaky Pettit MD, 12/09/21, 17:32 EST.  [facility] Hospitalist Team

## 2021-12-09 NOTE — DISCHARGE PLACEMENT REQUEST
"Tahira Roblero (66 y.o. Female)             Date of Birth Social Security Number Address Home Phone MRN    1955  [id number], [place] [address] [phone] [id number]    Roman Catholic Marital Status             [employer]ev[employer]        Admission Date Admission Type Admitting Provider Attending Provider Department, Room/Bed    12/8/21 Emergency Jaky Pettit MD Lackey, Diana, MD [facility] 3A MEDICAL INPATIENT, 345/1    Discharge Date Discharge Disposition Discharge Destination                         Attending Provider: Jaky Pettit MD    Allergies: Morphine, Penicillin V Potassium, Sulfa Antibiotics, Levaquin [Levofloxacin], Amoxicillin    Isolation: None   Infection: None   Code Status: No CPR   Advance Care Planning Activity    Ht: 165.1 cm (65\")   Wt: 69.3 kg (152 lb 11.2 oz)    Admission Cmt: None   Principal Problem: None                Active Insurance as of 12/8/2021     Primary Coverage     Payor Plan Insurance Group Employer/Plan Group    MEDICARE MEDICARE A & B      Payor Plan Address Payor Plan Phone Number Payor Plan Fax Number Effective Dates    [address] [phone]  7/1/2020 - None Entered    [facility] [address]       Subscriber Name Subscriber Birth Date Member ID       TAHIRA ROBLERO 1955 [id number]           Secondary Coverage     Payor Plan Insurance Group Employer/Plan Group    [employer]      Payor Plan Address Payor Plan Phone Number Payor Plan Fax Number Effective Dates    [address] [phone]  7/1/2020 - None Entered    [place] [address]       Subscriber Name Subscriber Birth Date Member ID       TAHIRA ROBLERO 1955 [id number]           Tertiary Coverage     Payor Plan Insurance Group Employer/Plan Group    [place]      Payor Plan Address Payor Plan Phone Number Payor Plan Fax Number Effective Dates    [address]   7/1/2020 - None Entered    " [place] [address]       Subscriber Name Subscriber Birth Date Member ID       EDE ROBLERO 1955 [id number]                 Emergency Contacts      (Rel.) Home Phone Work Phone Mobile Phone    DANIELLE MOLINA (Daughter) [phone] -- [phone]

## 2021-12-09 NOTE — H&P
"    [facility] Medicine Services      Patient Name: Tahira Zimmerman  : 1955  MRN: [id number]  Primary Care Physician:  Noemy Queen MD  Date of admission: 2021      Subjective      Chief Complaint: shortness of air     History of Present Illness: Tahira Zimmerman is a 66 y.o. female who presented to [facility] on 2021 complaining of increased shortness of air past several days.  She reports cough productive of yellowish-whitish sputum.  She denies any fever chills or diaphoresis.  She reports using oxygen at home at 6 to 8 L.  She is currently stable on same.  He has a past medical history of ovarian cancer initially diagnosed in .  He has metastasis to the brain, bone, and various abdominal organs.  He is being followed by oncology and receiving radiation and oral chemotherapy.  She is awake alert and a good historian.  She denies any altered mental status or confusion.  She reports \"I am just fine\".  She appears to be answering appropriately.  She reports she is a DNR with regards to no cardiac compressions however she is willing to be intubated if needed.  WBC is not elevated and she is afebrile.  Chest x-ray per radiology today:    \"1.Bilateral pulmonary infiltrates. Given history of malignancy this  might relate to lymphangitic spread of tumor. An underlying inflammatory  infectious process would be in the differential. Correlation with  clinical findings suggested.  \"    CT head per radiology today:    \"IMPRESSION:  1.Intracranial calcifications which have been suggested and could  reflect sequela to treated brain metastases.  2.Hyperostosis frontalis  3.Sclerotic area involving the clivus which could relate to known  metastatic disease. There also are some sclerotic areas involving the  calvarium near the convexity of the head.  \"    She has no slurred speech or facial droop and no focal deficits.  She reports a past medical history of DVTs and " is on home warfarin.  INR today was greater than 8 and she was given vitamin K in the emergency department.  Potassium was 3.3.  Review of records shows she was admitted here in August for COVID-19.  She is COVID-19 negative today.  She was started on IV ceftriaxone and IV azithromycin and will be admitted for further evaluation and treatment.  Medical history also includes hypertension, hyperlipidemia, anxiety and depression and anemia.          Review of Systems   Constitutional: Positive for decreased appetite and weight loss.   HENT: Negative.    Eyes: Negative.    Cardiovascular: Positive for dyspnea on exertion.   Respiratory: Positive for cough, shortness of breath and sputum production.    Endocrine: Negative.    Hematologic/Lymphatic: Negative.    Skin: Negative.    Musculoskeletal: Positive for muscle weakness.   Gastrointestinal: Negative.    Genitourinary: Negative.    Neurological: Positive for weakness.   Psychiatric/Behavioral: Negative.    Allergic/Immunologic: Negative.        Personal History     Past Medical History:   Diagnosis Date   • Anemia 2013   • Cervical disc disorder 2013    Herniated disc, pinched nerve   • Clotting disorder (HCC) 2013    Low platelets from chemo   • Colon polyp 2013   • Deep vein thrombosis (HCC) 2013   • Diverticulosis 2013   • GERD (gastroesophageal reflux disease) 2016   • HL (hearing loss) 2016    I need hearing aids   • Hyperlipidemia 2013    Need my cholesterol rechecked   • Hypertension    • Joint pain 2013    Shoulder pain, torn rotator cuff   • Low back pain 2013   • Neuromuscular disorder (HCC) 2015    Shingles, pinched nerves in my neck   • Osteopenia 2014   • Osteoporosis    • Ovarian cancer (HCC)     ovarian--  spread to lungs 10/2020   • Ovarian cancer on left (HCC) 1995   • Pneumonia 2010    Have had it several times   • Spinal stenosis 2013    Cervical   • Urinary tract infection 2013    Have had several utis       Past Surgical History:   Procedure  Laterality Date   • BRONCHOSCOPY N/A 2/10/2021    Procedure: BRONCHOSCOPY with bronchioalveolar lavage;  Surgeon: Jerica Esparza MD;  Location: [facility] ENDOSCOPY;  Service: Pulmonary;  Laterality: N/A;  post op:right lobe pneumonia   • BRONCHOSCOPY N/A 6/15/2021    Procedure: BRONCHOSCOPY with bronchoalveolar lavage;  Surgeon: Jerica Esparza MD;  Location: [facility] ENDOSCOPY;  Service: Pulmonary;  Laterality: N/A;  lung cancer;pneumonia   • CATARACT EXTRACTION     • COLON SURGERY     • COLONOSCOPY  05/26/2018   • EXPLORATORY LAPAROTOMY     • HERNIA REPAIR     • HYSTERECTOMY     • OOPHORECTOMY         Family History: family history includes Cancer in her father; Hypertension in her brother, father, mother, and sister; Stroke in her brother. Otherwise pertinent FHx was reviewed and not pertinent to current issue.    Social History:  reports that she has never smoked. She has never used smokeless tobacco. She reports that she does not drink alcohol and does not use drugs.    Home Medications:  Prior to Admission Medications     Prescriptions Last Dose Informant Patient Reported? Taking?    acetaminophen (TYLENOL) 500 MG tablet   Yes No    Take 1,000 mg by mouth Every 6 (Six) Hours As Needed for Mild Pain .    Calcium Carbonate 1500 (600 Ca) MG tablet   Yes No    Take 600 mg by mouth 2 (Two) Times a Day.    calcium carbonate-vitamin d 600-400 MG-UNIT per tablet   No No    Take 1 tablet by mouth 2 (Two) Times a Day.    cetirizine (zyrTEC) 10 MG tablet   No No    Take 1 tablet by mouth Every Night.    Combivent Respimat  MCG/ACT inhaler   Yes No    INHALE 1 PUFF FOUR TIMES DAILY AS NEEDED FOR SHORTNESS OF BREATH    cyanocobalamin 1000 MCG/ML injection   Yes No    Inject 1,000 mcg into the appropriate muscle as directed by prescriber Every 28 (Twenty-Eight) Days.    famotidine (PEPCID) 40 MG tablet   No No    Take 1 tablet by mouth Every Morning Before Breakfast.    folic acid (FOLVITE) 1 MG tablet   No No    Take 1 tablet by  mouth Daily.    HYDROcodone-homatropine (HYCODAN) 5-1.5 MG/5ML syrup   No No    Take 5 mL by mouth Every 8 (Eight) Hours As Needed for Cough.    letrozole (FEMARA) 2.5 MG tablet   No No    Take 1 tablet by mouth Daily.    MAGnesium-Oxide 400 (241.3 Mg) MG tablet tablet   No No    Take 1 tablet by mouth 3 (Three) Times a Day.    Misc. Devices (Commode Bedside) misc   No No    1 Device Daily.    Misc. Devices (Roller Walker) misc   No No    1 Device Daily.    montelukast (SINGULAIR) 10 MG tablet   No No    TAKE 1 TABLET BY MOUTH EVERY NIGHT    Mucus Relief  MG 12 hr tablet   No No    TAKE TWO TABLETS BY MOUTH TWICE A DAY    ondansetron (ZOFRAN) 8 MG tablet   Yes No    oxyCODONE (ROXICODONE) 20 MG tablet   No No    Take 1 tablet by mouth Every 4 (Four) Hours As Needed for Moderate Pain .    oxyCODONE (ROXICODONE) 20 MG tablet   No No    Take 1 to 1.5 tabs every 4 hours as needed for moderate pain.    phosphorus (K PHOS NEUTRAL) 155-852-130 MG tablet   No No    Take 1 tablet by mouth 3 (Three) Times a Day.    potassium chloride (K-DUR,KLOR-CON) 20 MEQ CR tablet   Yes No    Take 60 mEq by mouth Daily.    potassium chloride (K-DUR,KLOR-CON) 20 MEQ CR tablet   No No    Take 2 tablets by mouth Every Night.    pregabalin (LYRICA) 100 MG capsule   No No    Take 1 capsule by mouth 3 (Three) Times a Day.    sertraline (ZOLOFT) 50 MG tablet   Yes No    Take 50 mg by mouth Every Night.    temazepam (RESTORIL) 30 MG capsule   No No    TAKE 1 CAPSULE BY MOUTH AT NIGHT AS NEEDED FOR SLEEP    triamterene-hydrochlorothiazide (DYAZIDE) 37.5-25 MG per capsule   No No    Take 1 capsule by mouth Daily.    warfarin (COUMADIN) 2 MG tablet   No No    DVT  Indications: Atrial Fibrillation    Patient taking differently:  5 mg. DVT  Indications: Atrial Fibrillation    warfarin (COUMADIN) 4 MG tablet   No No    Take as directed            Allergies:  Allergies   Allergen Reactions   • Morphine Nausea Only and Hives   • Penicillin V  Potassium Rash   • Sulfa Antibiotics Rash   • Levaquin [Levofloxacin] Nausea Only and Dizziness     When taken with Coumadin   • Amoxicillin Rash       Objective      Vitals:   Temp:  [98 °F (36.7 °C)] 98 °F (36.7 °C)  Heart Rate:  [85-95] 85  Resp:  [22] 22  BP: (116-133)/(67-77) 118/68  Flow (L/min):  [4-8] 4    Physical Exam  Vitals reviewed.   Constitutional:       Appearance: Normal appearance. She is obese.   HENT:      Head: Normocephalic.      Comments: alopecia     Right Ear: External ear normal.      Left Ear: External ear normal.      Nose: Nose normal.      Mouth/Throat:      Mouth: Mucous membranes are moist.   Eyes:      Extraocular Movements: Extraocular movements intact.   Cardiovascular:      Rate and Rhythm: Normal rate and regular rhythm.      Pulses: Normal pulses.      Heart sounds: Normal heart sounds.   Pulmonary:      Effort: Pulmonary effort is normal.      Breath sounds: Normal breath sounds.   Abdominal:      Palpations: Abdomen is soft.   Genitourinary:     Comments: deferred  Musculoskeletal:         General: Normal range of motion.      Cervical back: Normal range of motion and neck supple.   Skin:     General: Skin is warm and dry.   Neurological:      General: No focal deficit present.      Mental Status: She is alert and oriented to person, place, and time.   Psychiatric:         Mood and Affect: Mood normal.         Behavior: Behavior normal.         Thought Content: Thought content normal.         Judgment: Judgment normal.         Result Review    Result Review:  I have personally reviewed the results from the time of this admission to 12/8/2021 22:06 EST and agree with these findings:  [x]  Laboratory  [x]  Microbiology  [x]  Radiology  []  EKG/Telemetry   []  Cardiology/Vascular   []  Pathology  [x]  Old records  []  Other:  Most notable findings include: afebrile, CXR per radiology bilateral infiltrates, CT head per radiology known mets, INR > 8 and K  3.3     Assessment/Plan         Active Hospital Problems:  Active Hospital Problems    Diagnosis    • Pneumonia due to infectious organism    • Coumadin toxicity    • AMS (altered mental status)    • Hypokalemia    • Bone metastases (HCC)    • Depression    • Brain metastases (HCC)    • Malignant neoplasm of right ovary (HCC)      Plan:    Pneumonia due to infectious organism, COVID-19 negative, chest x-ray per radiology shows bilateral pulmonary infiltrates, WBC not elevated, continue IV ceftriaxone and IV azithromycin until blood cultures resulted, DuoNebs every 4 hours as needed and albuterol every 6 hours as needed, stable on home 6 L oxygen via nasal cannula    Coumadin toxicity INR greater than 8 with past medical history of DVT, hold home Coumadin for now given vitamin K in ED, INR daily pharmacy to dose warfarin to INR    Altered mental status?  Patient appears awake and alert and answers appropriately, she denies confusion, CT head negative for acute changes, mets to brain known    Hypokalemia mild potassium 3.3, potassium protocol in place repeat BNP in a.m. magnesium in a.m.    Ovarian  cancer with mets to bone brain and abdominal, followed by  of oncology, consulted Home meds unverified at this time reorder pending verification pharmacy    Hypertension, controlled on home meds unverified at this time reorder pending verification from pharmacy monitor BP    Anxiety depression, home meds unverified at this time reorder pending verification from pharmacy    Poor appetite, weight loss likely secondary to malignancy, boost 3 times daily ordered, dietary consulted    DVT prophylaxis:  No DVT prophylaxis order currently exists.    CODE STATUS:    Level Of Support Discussed With: Patient  Code Status (Patient has no pulse and is not breathing): No CPR (Do Not Attempt to Resuscitate)  Medical Interventions (Patient has pulse or is breathing): Full Support    Admission Status:  I believe this patient meets inpatient status.    I  discussed the patient's findings and my recommendations with patient.    This patient has been examined wearing appropriate Personal Protective Equipment    Signature: Electronically signed by IRENA Malave, 12/08/21, 10:11 PM EST.

## 2021-12-09 NOTE — PROGRESS NOTES
"Pharmacy dosing service  Anticoagulant  Warfarin     Subjective:    Tahira Zimmerman is a 66 y.o.female being continued on warfarin for atrial fibrillation.    INR Goal: 2 - 3  Home medication?: Yes, warfarin 4 mg PO every day at 1800 for the last week.  Prior patient was on warfarin 5mg daily.  Bridge Therapy Present?:  No  Interacting Medications Evaluation (New/Present/Discontinued): luis fernando  Additional Contributing Factors: none      Assessment/Plan:    Patient admitted with supratherapeutic INR.  Vitamin K 10mg sq given x1.  Home dose for the last week was warfarin 4mg daily, per patient report.  INR now WNL, but expect it to further drop d/t vitamin K administration.  Will give 1x warfarin 2mg dose tonight.  Will adjust dose accordingly tomorrow.     Continue to monitor and adjust based on INR.         Date 12/9           INR 2.23           Dose 2mg               Objective:  [Ht: 165.1 cm (65\"); Wt: 69.3 kg (152 lb 11.2 oz); BMI: Body mass index is 25.41 kg/m².]    Lab Results   Component Value Date    ALBUMIN 2.60 (L) 12/08/2021     Lab Results   Component Value Date    INR 2.23 12/09/2021    INR >=8.00 (C) 12/08/2021    INR 3.00 (H) 12/02/2021    PROTIME 23.3 12/09/2021    PROTIME 78.6 (H) 12/08/2021    PROTIME 22.4 08/30/2021     Lab Results   Component Value Date    HGB 8.8 (L) 12/09/2021    HGB 9.6 (L) 12/08/2021    HGB 10.5 (L) 12/02/2021     Lab Results   Component Value Date    HCT 27.4 (L) 12/09/2021    HCT 29.6 (L) 12/08/2021    HCT 33.6 (L) 12/02/2021       Keo Garcia, PharmD  12/09/21 13:34 EST     "

## 2021-12-09 NOTE — PLAN OF CARE
Goal Outcome Evaluation:           Progress: no change  Outcome Summary: Pt resting in bed.  Pt with L chest port w/fluids infusing per order.  Pt with nodules on scalp/head and chest.  DCP unknown at this time but lives with daughter prior to admit.

## 2021-12-09 NOTE — CASE MANAGEMENT/SOCIAL WORK
Discharge Planning Assessment   [facility]     Patient Name: Tahira Zimmerman  MRN: [id number]  Today's Date: 12/9/2021    Admit Date: 12/8/2021     Discharge Needs Assessment     Row Name 12/09/21 1517       Living Environment    Lives With child(quinn), adult    Current Living Arrangements home/apartment/condo    Primary Care Provided by self    Provides Primary Care For no one, unable/limited ability to care for self    Family Caregiver if Needed child(quinn), adult    Quality of Family Relationships helpful    Able to Return to Prior Arrangements yes       Resource/Environmental Concerns    Resource/Environmental Concerns none    Transportation Concerns car, none       Transition Planning    Patient/Family Anticipates Transition to home with family; home with help/services    Patient/Family Anticipated Services at Transition ; home health care    Transportation Anticipated family or friend will provide       Discharge Needs Assessment    Readmission Within the Last 30 Days no previous admission in last 30 days    Current Outpatient/Agency/Support Group homecare agency    Concerns to be Addressed discharge planning    Anticipated Changes Related to Illness none    Equipment Needed After Discharge none    Provided Post Acute Provider List? N/A    Refused Provider List Comment current with [facility]    Current Discharge Risk chronically ill               Discharge Plan     Row Name 12/09/21 7963       Plan    Plan Anticipate rotuine home, current with [facility], [place] , TAISHA order placed. Continuous home O2 through [facility].    Patient/Family in Agreement with Plan yes    Plan Comments Met with patient at bedside, reports she lives at home with daughter. [facility], confirmed with [facility]. TAISHA order placed. Has home O2 through [facility], reports she wears 6-8 L. PCP and pharmacy confirmed. No issues affording food or medications. Currently denies any additional d/c needs or concerns.               Continued Care and Services - Admitted Since 12/8/2021     Home Medical Care Coordination complete.    Service Provider Request Status Selected Services Address Phone Fax Patient Preferred    [facility]   Selected [facility] Health Services [address] [facility] [address] [facility] [place] [phone] -- --              Expected Discharge Date and Time     Expected Discharge Date Expected Discharge Time    Dec 11, 2021          Demographic Summary     Row Name 12/09/21 1517       General Information    Admission Type inpatient    Arrived From emergency department    Required Notices Provided Important Message from Medicare    Referral Source admission list    Reason for Consult discharge planning    Preferred Language English               Functional Status     Row Name 12/09/21 1517       Functional Status    Usual Activity Tolerance fair    Current Activity Tolerance fair       Functional Status, IADL    Medications independent    Meal Preparation assistive person    Housekeeping assistive person    Laundry assistive person    Shopping assistive person                    Patient Forms     Row Name 12/09/21 1520       Patient Forms    Important Message from Medicare ([facility]) --  IM delivered 12/8              Met with patient in room wearing PPE: mask      Maintained distance greater than six feet and spent less than 15 minutes in the room.          Kayy Villanueva RN

## 2021-12-10 NOTE — ANESTHESIA POSTPROCEDURE EVALUATION
Patient: Tahira Zimmerman    Procedure Summary     Date: 12/10/21 Room / Location: [facility] OR 06 / [facility] MAIN OR    Anesthesia Start: 1125 Anesthesia Stop: 1148    Procedure: REMOVAL VENOUS ACCESS DEVICE (N/A ) Diagnosis:       Port-A-Cath in place      (Port-A-Cath in place [Z95.828])    Surgeons: Jose Urena MD Provider: Wili Hubbard MD    Anesthesia Type: MAC ASA Status: 3          Anesthesia Type: MAC    Vitals  Vitals Value Taken Time   /88 12/10/21 1211   Temp     Pulse 91 12/10/21 1212   Resp 20 12/10/21 1210   SpO2 95 % 12/10/21 1212   Vitals shown include unvalidated device data.        Post Anesthesia Care and Evaluation    Patient location during evaluation: PACU  Patient participation: complete - patient participated  Level of consciousness: awake  Pain scale: See nurse's notes for pain score.  Pain management: adequate  Airway patency: patent  Anesthetic complications: No anesthetic complications  PONV Status: none  Cardiovascular status: acceptable  Respiratory status: acceptable  Hydration status: acceptable    Comments: Patient seen and examined postoperatively; vital signs stable; SpO2 greater than or equal to 90%; cardiopulmonary status stable; nausea/vomiting adequately controlled; pain adequately controlled; no apparent anesthesia complications; patient discharged from anesthesia care when discharge criteria were met

## 2021-12-10 NOTE — PROGRESS NOTES
"Pharmacy dosing service  Anticoagulant  Warfarin     Subjective:    Tahira Zimmerman is a 66 y.o.female being continued on warfarin for atrial fibrillation.    INR Goal: 2 - 3  Home medication?: Yes, warfarin 4 mg PO every day at 1800 for the last week.  Prior patient was on warfarin 5mg daily.  Bridge Therapy Present?:  No  Interacting Medications Evaluation (New/Present/Discontinued): luis fernando  Additional Contributing Factors: none      Assessment/Plan:    Patient admitted with supratherapeutic INR.  Vitamin K 10mg sq given x1 on 12/8.  INR continues to drop to 1.27.  Home dose for the last week was warfarin 4mg daily, per patient report.  Port was removed today.  Ok to start treatment lovenox tonight per Dr. Pettit.  I suspect it will take some time to overcome the effects of the vitamin K before we start to see a rise in INR.  OK to dc lovenox once INR is therapeutic.  Will plan to give warfarin 3mg tonight.    Continue to monitor and adjust based on INR.         Date 12/9 12/10          INR 2.23 1.27          Dose 2mg 3mg              Objective:  [Ht: 165.1 cm (65\"); Wt: 69.3 kg (152 lb 11.2 oz); BMI: Body mass index is 25.41 kg/m².]    Lab Results   Component Value Date    ALBUMIN 2.40 (L) 12/10/2021     Lab Results   Component Value Date    INR 1.27 (L) 12/10/2021    INR 1.42 (L) 12/09/2021    INR 2.23 12/09/2021    PROTIME 13.9 (L) 12/10/2021    PROTIME 15.4 (L) 12/09/2021    PROTIME 23.3 12/09/2021     Lab Results   Component Value Date    HGB 8.3 (L) 12/10/2021    HGB 8.8 (L) 12/09/2021    HGB 9.6 (L) 12/08/2021     Lab Results   Component Value Date    HCT 25.6 (L) 12/10/2021    HCT 27.4 (L) 12/09/2021    HCT 29.6 (L) 12/08/2021       Keo Garcia, PharmD  12/10/21 13:21 EST       "

## 2021-12-10 NOTE — PROGRESS NOTES
Infectious Diseases Progress Note      LOS: 2 days   Patient Care Team:  Noemy Queen MD as PCP - General (Family Medicine)  Cindy Ball MD as Consulting Physician (Hematology and Oncology)    Chief Complaint: Shortness of breath    Subjective       The patient has been afebrile for the last 24 hours.  The patient is on and liters of oxygen by nasal cannula, hemodynamically stable, and is tolerating antimicrobial therapy.  She is back from having her port removed      Review of Systems:   Review of Systems   Constitutional: Positive for fatigue.   HENT: Negative.    Eyes: Negative.    Respiratory: Positive for shortness of breath.    Cardiovascular: Negative.    Gastrointestinal: Negative.    Endocrine: Negative.    Genitourinary: Negative.    Musculoskeletal: Negative.    Skin: Negative.    Neurological: Negative.    Psychiatric/Behavioral: Negative.    All other systems reviewed and are negative.       Objective     Vital Signs  Temp:  [97.1 °F (36.2 °C)-98.3 °F (36.8 °C)] 97.1 °F (36.2 °C)  Heart Rate:  [69-98] 98  Resp:  [14-28] 20  BP: (122-147)/(77-88) 140/88    Physical Exam:  Physical Exam  Vitals and nursing note reviewed.   Constitutional:       General: She is not in acute distress.     Appearance: Normal appearance. She is well-developed and normal weight. She is not diaphoretic.   HENT:      Head: Normocephalic and atraumatic.   Eyes:      General: No scleral icterus.     Extraocular Movements: Extraocular movements intact.      Conjunctiva/sclera: Conjunctivae normal.      Pupils: Pupils are equal, round, and reactive to light.   Cardiovascular:      Rate and Rhythm: Normal rate and regular rhythm.      Heart sounds: Normal heart sounds, S1 normal and S2 normal. No murmur heard.      Pulmonary:      Effort: Pulmonary effort is normal. No respiratory distress.      Breath sounds: No stridor. Rales present. No wheezing.   Chest:      Chest wall: No tenderness.   Abdominal:      General:  Bowel sounds are normal. There is no distension.      Palpations: Abdomen is soft. There is no mass.      Tenderness: There is no abdominal tenderness. There is no guarding.   Musculoskeletal:         General: No swelling, tenderness or deformity. Normal range of motion.      Cervical back: Neck supple.   Skin:     General: Skin is warm and dry.      Coloration: Skin is not pale.      Findings: No bruising, erythema or rash.      Comments: Multiple skin nodules noticed on the head and 1 large nodule in the left anterior chest     Port has been removed   Neurological:      General: No focal deficit present.      Mental Status: She is alert and oriented to person, place, and time. Mental status is at baseline.      Cranial Nerves: No cranial nerve deficit.   Psychiatric:         Mood and Affect: Mood normal.          Results Review:    I have reviewed all clinical data, test, lab, and imaging results.     Radiology  CT Chest Without Contrast Diagnostic    Result Date: 12/9/2021  CT CHEST WO CONTRAST DIAGNOSTIC-  Date of Exam: 12/9/2021 4:05 PM  Indication: Diffuse interstitial infiltrates; J18.9-Pneumonia, unspecified organism; R06.02-Shortness of breath; R93.0-Abnormal findings on diagnostic imaging of skull and head, not elsewhere classified; R79.1-Abnormal coagulation profile; Z95.828-Presence of other vascular implants and grafts.  History of ovarian cancer.  Comparison: [facility] chest x-ray 12/08/2021, CT chest 10/20/2021  Technique: Serial and axial CT images of the chest were obtained. Reconstructions in the coronal and sagittal planes were performed. Automated exposure control and iterative reconstruction methods were used.  FINDINGS: Partially calcified mediastinal adenopathy is not significantly changed. Index preaortic, retrosternal lymph node on axial image 33 measures 14 x 12 mm, previously 12 x 12 mm. Main pulmonary artery remains mildly dilated. Heart size is mildly enlarged. There is no pericardial or  pleural effusion. Interstitial opacities are redemonstrated in both lungs, but there are new areas of patchy consolidation in the lung apices, right upper lobe, right middle lobe, and left lower lobe, as well as increased ground glass opacities. Mucous plugging in the lower lobes appears increased. Partially included solid organs of the upper abdomen appear unremarkable for noncontrast CT. Subcutaneous nodules in the chest and included upper abdomen have not significantly changed. Index nodule anterior to the left side of the sternal on axial image 31 measures 12 mm, previously 13 mm. Extensive sclerotic osseous metastatic disease has not significantly progressed. No pathologic vertebral fractures are seen. Evaluation for rib fractures is limited due to motion.      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 2.Mucous plugging in the lower lobes. 3.Similar findings of metastatic disease in the chest, including partially calcified mediastinal lymphadenopathy, subcutaneous nodules, and extensive osseous metastatic disease.    Electronically Signed By-Dary Thapa MD On:12/9/2021 4:57 PM This report was finalized on [id number] by  Dary Thapa MD.      Cardiology    Laboratory    Results from last 7 days   Lab Units 12/10/21  0537 12/09/21  0529 12/08/21 1910   WBC 10*3/mm3 7.40 5.90 4.20   HEMOGLOBIN g/dL 8.3* 8.8* 9.6*   HEMATOCRIT % 25.6* 27.4* 29.6*   PLATELETS 10*3/mm3 203 232 222     Results from last 7 days   Lab Units 12/10/21  0537 12/09/21  0529 12/08/21 1910   SODIUM mmol/L 136 134* 134*   POTASSIUM mmol/L 3.9 4.1 3.3*   CHLORIDE mmol/L 101 99 97*   CO2 mmol/L 25.0 26.0 25.0   BUN mg/dL 24* 29* 31*   CREATININE mg/dL 0.73 0.95 1.01*   GLUCOSE mg/dL 86 120* 155*   ALBUMIN g/dL 2.40*  --  2.60*   BILIRUBIN mg/dL 0.2   --  0.3   ALK PHOS U/L 181*  --  209*   AST (SGOT) U/L 26  --  22   ALT (SGPT) U/L 13  --  10   CALCIUM mg/dL 6.7* 6.8* 7.0*                 Microbiology   Microbiology Results (last 10 days)       Procedure Component Value - Date/Time    Wound Culture - Wound, Chest, Left [[id number]] Collected: 12/10/21 1148    Lab Status: Preliminary result Specimen: Wound from Chest, Left Updated: 12/10/21 1247     Gram Stain Rare (1+) WBCs per low power field      No organisms seen    Blood Culture - Blood, Chest, Left [[id number]]  (Abnormal) Collected: 12/08/21 1946    Lab Status: Preliminary result Specimen: Blood from Chest, Left Updated: 12/10/21 0611     Blood Culture Candida albicans     Comment: Infectious disease consultation is highly recommended to rule out distant foci of infection.        Isolated from Aerobic Bottle     Gram Stain Aerobic Bottle Yeast    Blood Culture - Blood, Chest, Left [[id number]]  (Abnormal) Collected: 12/08/21 1910    Lab Status: Preliminary result Specimen: Blood from Chest, Left Updated: 12/10/21 0610     Blood Culture Candida albicans     Comment: Infectious disease consultation is highly recommended to rule out distant foci of infection.        Isolated from Aerobic and Anaerobic Bottles     Gram Stain Aerobic Bottle Yeast      Anaerobic Bottle Yeast    Respiratory Panel PCR w/COVID-19(SARS-CoV-2) JAMAL/ROLF/ABENA/PAD/COR/MAD/MICHAEL In-House, NP Swab in UTM/VTM, 3-4 HR TAT - Swab, Nasopharynx [[id number]]  (Normal) Collected: 12/08/21 1910    Lab Status: Final result Specimen: Swab from Nasopharynx Updated: 12/08/21 2029     ADENOVIRUS, PCR Not Detected     Coronavirus 229E Not Detected     Coronavirus HKU1 Not Detected     Coronavirus NL63 Not Detected     Coronavirus OC43 Not Detected     COVID19 Not Detected     Human Metapneumovirus Not Detected     Human Rhinovirus/Enterovirus Not Detected     Influenza A PCR Not Detected     Influenza B PCR Not Detected     Parainfluenza Virus 1 Not Detected      Parainfluenza Virus 2 Not Detected     Parainfluenza Virus 3 Not Detected     Parainfluenza Virus 4 Not Detected     RSV, PCR Not Detected     Bordetella pertussis pcr Not Detected     Bordetella parapertussis PCR Not Detected     Chlamydophila pneumoniae PCR Not Detected     Mycoplasma pneumo by PCR Not Detected    Narrative:      In the setting of a positive respiratory panel with a viral infection PLUS a negative procalcitonin without other underlying concern for bacterial infection, consider observing off antibiotics or discontinuation of antibiotics and continue supportive care. If the respiratory panel is positive for atypical bacterial infection (Bordetella pertussis, Chlamydophila pneumoniae, or Mycoplasma pneumoniae), consider antibiotic de-escalation to target atypical bacterial infection.    Blood Culture ID, PCR - Blood, Chest, Left [[id number]]  (Abnormal) Collected: 12/08/21 1910    Lab Status: Final result Specimen: Blood from Chest, Left Updated: 12/09/21 1422     BCID, PCR Candida albicans. Identification by BCID2 PCR     BOTTLE TYPE Aerobic Bottle    Narrative:      Infectious disease consultation is highly recommended to rule out distant foci of infection.            Medication Review:       Schedule Meds  calcium 500 mg vitamin D 5 mcg (200 UT), 1 tablet, Oral, BID  cefTRIAXone, 1 g, Intravenous, Q24H  cetirizine, 10 mg, Oral, Nightly  enoxaparin, 1 mg/kg, Subcutaneous, Q12H  erythromycin, , Right Eye, 4x Daily  famotidine, 40 mg, Oral, QAM AC  folic acid, 1,000 mcg, Oral, Daily  guaiFENesin, 1,200 mg, Oral, BID  letrozole, 2.5 mg, Oral, Daily  magnesium oxide, 400 mg, Oral, TID  micafungin (MYCAMINE) IV, 150 mg, Intravenous, Q24H  montelukast, 10 mg, Oral, Nightly  phosphorus, 250 mg, Oral, TID  potassium chloride, 20 mEq, Oral, BID With Meals  pregabalin, 100 mg, Oral, TID  senna-docusate sodium, 2 tablet, Oral, BID  sertraline, 50 mg, Oral, Nightly  sodium chloride, 3 mL, Intravenous,  Q12H  triamterene-hydrochlorothiazide, 1 tablet, Oral, Daily        Infusion Meds  lactated ringers, 20 mL/hr, Last Rate: 20 mL/hr (12/10/21 1125)  Pharmacy to Dose enoxaparin (LOVENOX),   Pharmacy to dose warfarin,   sodium chloride, 75 mL/hr, Last Rate: 75 mL/hr (12/10/21 0915)        PRN Meds    acetaminophen **OR** acetaminophen **OR** acetaminophen    albuterol sulfate HFA    senna-docusate sodium **AND** polyethylene glycol **AND** bisacodyl **AND** bisacodyl    ipratropium-albuterol    melatonin    ondansetron **OR** ondansetron    oxyCODONE    Pharmacy to Dose enoxaparin (LOVENOX)    Pharmacy to dose warfarin    potassium chloride **OR** potassium chloride **OR** potassium chloride    sodium chloride    sodium chloride    temazepam        Assessment/Plan       Antimicrobial Therapy   1.  Micafungin        2.  Rocephin        3.        4.        5.            Assessment     Candidemia, PCR detected Candida albicans.  The most likely source of candidemia is infected port  -Port was removed on 12/10/2021-tip culture is pending     Dyspnea and hypoxia.  Chest x-ray showed interstitial lung infiltrates.  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  Patient also have multiple skin nodules concerning for metastasis.  Patient was on oral chemotherapy prior to admission     The patient is s/p port placement in the past     Plan     Continue IV micafungin 150 mg daily patient will need 2 weeks of treatment  Continue IV Rocephin for now  Consult pulmonology to evaluate patient for interstitial lung disease, probably lymphangitic spread of malignancy  Continue supportive  care  A.m. labs  Long-term prognosis is poor secondary to advanced and metastatic ovarian cancer       The above note was transcribed for Dr. Shelby-physical exam and review of systems were performed by him    Millicent Fowler, IRENA  12/10/21  15:57 EST    Note is dictated utilizing voice recognition software/Dragon

## 2021-12-10 NOTE — PROGRESS NOTES
[facility] Medicine Services Daily Progress Note    Patient Name: Tahira Zimmerman  : 1955  MRN: [id number]  Primary Care Physician:  Noemy Queen MD  Date of admission: 2021      Subjective      Chief Complaint: Cough with shortness of breath and pneumonia.    12/10/2021  Overall the patient is feeling better.  No new issues.  Did have an uneventful trip to the operating room for removal of her port.    Review of Systems   Constitutional: Positive for decreased appetite, malaise/fatigue and weight loss.   HENT: Negative.    Eyes: Negative.    Cardiovascular: Negative.    Respiratory: Positive for cough and sputum production.    Endocrine: Negative.    Hematologic/Lymphatic: Negative.    Skin: Negative.    Musculoskeletal: Positive for muscle weakness.   Gastrointestinal: Positive for dysphagia.   Genitourinary: Negative.    Neurological: Negative.    Psychiatric/Behavioral: Negative.    Allergic/Immunologic: Negative.    All other systems reviewed and are negative.         Objective      Vitals:   Temp:  [97.1 °F (36.2 °C)-98.3 °F (36.8 °C)] 97.8 °F (36.6 °C)  Heart Rate:  [69-98] 92  Resp:  [14-28] 20  BP: (117-147)/(75-88) 117/75  Flow (L/min):  [5-10] 5    Physical Exam  Vitals and nursing note reviewed.   Constitutional:       General: She is not in acute distress.     Appearance: Normal appearance. She is well-developed. She is not ill-appearing, toxic-appearing or diaphoretic.      Comments: The patient is very ill in her appearance. She was coughing and somewhat choking at the time of my visit. She has been seen by speech and her diet has been adjusted.   HENT:      Head: Normocephalic and atraumatic.      Right Ear: Ear canal and external ear normal.      Left Ear: Ear canal and external ear normal.      Nose: Nose normal. No congestion or rhinorrhea.      Mouth/Throat:      Mouth: Mucous membranes are moist.      Pharynx: No oropharyngeal exudate.   Eyes:       General: No scleral icterus.        Right eye: No discharge.         Left eye: No discharge.      Extraocular Movements: Extraocular movements intact.      Conjunctiva/sclera: Conjunctivae normal.      Pupils: Pupils are equal, round, and reactive to light.   Neck:      Thyroid: No thyromegaly.      Vascular: No carotid bruit or JVD.      Trachea: No tracheal deviation.   Cardiovascular:      Rate and Rhythm: Normal rate and regular rhythm.      Pulses: Normal pulses.      Heart sounds: Normal heart sounds. No murmur heard.  No friction rub. No gallop.    Pulmonary:      Effort: Pulmonary effort is normal. No respiratory distress.      Breath sounds: Normal breath sounds. No stridor. No wheezing, rhonchi or rales.   Chest:      Chest wall: No tenderness.   Abdominal:      General: Bowel sounds are normal. There is no distension.      Palpations: Abdomen is soft. There is no mass.      Tenderness: There is no abdominal tenderness. There is no guarding or rebound.      Hernia: No hernia is present.   Musculoskeletal:         General: No swelling, tenderness, deformity or signs of injury. Normal range of motion.      Cervical back: Normal range of motion and neck supple. No rigidity. No muscular tenderness.      Right lower leg: No edema.      Left lower leg: No edema.   Lymphadenopathy:      Cervical: No cervical adenopathy.   Skin:     General: Skin is warm and dry.      Coloration: Skin is not jaundiced or pale.      Findings: No bruising, erythema or rash.   Neurological:      General: No focal deficit present.      Mental Status: She is alert and oriented to person, place, and time. Mental status is at baseline.      Cranial Nerves: No cranial nerve deficit.      Sensory: No sensory deficit.      Motor: No weakness or abnormal muscle tone.      Coordination: Coordination normal.   Psychiatric:         Mood and Affect: Mood normal.         Behavior: Behavior normal.         Thought Content: Thought content normal.          Judgment: Judgment normal.             Result Review    Result Review:  I have personally reviewed the results from the time of this admission to 12/10/2021 18:41 EST and agree with these findings:  [x]  Laboratory  [x]  Microbiology  [x]  Radiology  []  EKG/Telemetry   []  Cardiology/Vascular   []  Pathology  []  Old records  [x]  Other:  Most notable findings include: Speech therapy report    Wounds (last 24 hours)     LDA Wound     Row Name 12/10/21 1210 12/10/21 1205 12/10/21 1155       Wound 12/10/21 1137 Left upper chest Incision    Wound - Properties Group Placement Date: 12/10/21  -TT Placement Time: 1137  -TT Side: Left  -TT Orientation: upper  -TT Location: chest  -TT Primary Wound Type: Incision  -TT    Dressing Appearance dry; intact; no drainage  -HENOK dry; intact; no drainage  -HENOK dry; intact; no drainage  -HENOK    Closure Liquid skin adhesive; Approximated  -HENOK Liquid skin adhesive; Approximated  -HENOK Liquid skin adhesive; Approximated  -HENOK    Retired Wound - Properties Group Date first assessed: 12/10/21  -TT Time first assessed: 1137  -TT Side: Left  -TT Location: chest  -TT Primary Wound Type: Incision  -TT    Row Name 12/10/21 1152             Wound 12/10/21 1137 Left upper chest Incision    Wound - Properties Group Placement Date: 12/10/21  -TT Placement Time: 1137  -TT Side: Left  -TT Orientation: upper  -TT Location: chest  -TT Primary Wound Type: Incision  -TT      Closure Liquid skin adhesive; Sutures  -TT      Retired Wound - Properties Group Date first assessed: 12/10/21  -TT Time first assessed: 1137  -TT Side: Left  -TT Location: chest  -TT Primary Wound Type: Incision  -TT            User Key  (r) = Recorded By, (t) = Taken By, (c) = Cosigned By    Initials Name Provider Type    Eve Castano RN Registered Nurse    Johnna Navarrete RN Registered Nurse                  Assessment/Plan      Brief Patient Summary:  Tahira Zimmerman is a 66 y.o. female who has numerous medical  problems which include metastatic ovarian cancer which is widely metastatic, hypertension, coagulopathy related to Coumadin dosing with INR of eight, history of DVTs. The patient presented for increasing issues with swallowing.      calcium 500 mg vitamin D 5 mcg (200 UT), 1 tablet, Oral, BID  cefTRIAXone, 1 g, Intravenous, Q24H  cetirizine, 10 mg, Oral, Nightly  enoxaparin, 1 mg/kg, Subcutaneous, Q12H  erythromycin, , Right Eye, 4x Daily  famotidine, 40 mg, Oral, QAM AC  folic acid, 1,000 mcg, Oral, Daily  guaiFENesin, 1,200 mg, Oral, BID  letrozole, 2.5 mg, Oral, Daily  magnesium oxide, 400 mg, Oral, TID  micafungin (MYCAMINE) IV, 150 mg, Intravenous, Q24H  montelukast, 10 mg, Oral, Nightly  phosphorus, 250 mg, Oral, TID  potassium chloride, 20 mEq, Oral, BID With Meals  pregabalin, 100 mg, Oral, TID  senna-docusate sodium, 2 tablet, Oral, BID  sertraline, 50 mg, Oral, Nightly  sodium chloride, 3 mL, Intravenous, Q12H  triamterene-hydrochlorothiazide, 1 tablet, Oral, Daily       lactated ringers, 20 mL/hr, Last Rate: 20 mL/hr (12/10/21 1125)  Pharmacy to Dose enoxaparin (LOVENOX),   Pharmacy to dose warfarin,   sodium chloride, 75 mL/hr, Last Rate: 75 mL/hr (12/10/21 0915)         Active Hospital Problems:  Active Hospital Problems    Diagnosis    • **Port-A-Cath in place    • Pneumonia due to infectious organism    • Coumadin toxicity    • Hypokalemia    • AMS (altered mental status)    • Bone metastases (HCC)    • Depression    • Brain metastases (HCC)    • Malignant neoplasm of right ovary (HCC)      Plan:   Pneumonia due to infectious organism, COVID-19 negative, chest x-ray per radiology shows bilateral pulmonary infiltrates, WBC not elevated, continue IV ceftriaxone and IV azithromycin until blood cultures resulted, DuoNebs every 4 hours as needed and albuterol every 6 hours as needed, stable on home 6 L oxygen via nasal cannula. Will follow closely to see if she improves on the ceftriaxone. Discontinue the  a azithromycin as her viral panel was negative. She is allergic to penicillins therefore will not change her to something like Zosyn which might help if this were aspiration. We will follow her closely and if need be start Cleocin.     Coumadin toxicity INR greater than 8 with past medical history of DVT, hold home Coumadin for now given vitamin K in ED, INR daily pharmacy to dose warfarin to INR. Her INR is down to 1.42 at 3:49 this afternoon. We will continue to follow and restart with pharmacy's help.  We will start patient on a Lovenox bridge in the morning if okay with surgery.     Altered mental status?  Patient appears awake and alert and answers appropriately, she denies confusion, CT head negative for acute changes, mets to brain known     Hypokalemia mild potassium 3.3, potassium protocol in place repeat BNP in a.m. magnesium in a.m.     Ovarian  cancer with mets to bone brain and abdominal, followed by  of oncology, consulted Home meds unverified at this time reorder pending verification pharmacy     Hypertension, controlled on home meds unverified at this time reorder pending verification from pharmacy monitor BP     Anxiety depression, home meds unverified at this time reorder pending verification from pharmacy     Poor appetite, weight loss likely secondary to malignancy, boost 3 times daily ordered, dietary consulted     The patient also has dysphagia and was choking at the time of my visit. She was at that time eating/drinking liquids. I did ask speech therapy to reevaluate her diet as it did appear that there was more of a problem than might have previously been thought.    The patient had the port removed today.  She has been started on micafungin IV and an infectious disease consultation has been requested and achieved    DVT prophylaxis:  Medical DVT prophylaxis orders are present.    CODE STATUS:    Level Of Support Discussed With: Patient  Code Status (Patient has no pulse and is not  breathing): No CPR (Do Not Attempt to Resuscitate)  Medical Interventions (Patient has pulse or is breathing): Full Support      Disposition:  I expect patient to be discharged in 2 to 3 days if she improves.    This patient has been examined wearing appropriate Personal Protective Equipment and discussed with hospital infection control department. 12/10/21      Electronically signed by Jaky Pettit MD, 12/10/21, 18:41 EST.  [facility]ist Team

## 2021-12-10 NOTE — PLAN OF CARE
Goal Outcome Evaluation:   Pt had port removed today. Resting well. Pain meds given as needed. Still having trouble with swallowing. Taking meds with applesauce or pudding. VSS continue to monitor.

## 2021-12-10 NOTE — OP NOTE
Operative Note    Tahira Zimmerman  12/10/2021    Pre-op Diagnosis:   Port-A-Cath in place [Z95.828]    Post-op Diagnosis:     Post-Op Diagnosis Codes:     * Port-A-Cath in place [Z95.828]    Procedure/CPT® Codes:      Procedure(s):  REMOVAL VENOUS ACCESS DEVICE    Surgeon(s):  Jose Urena MD    Anesthesia: Monitored Anesthesia Care    Staff:   Circulator: Johnna Barnard RN  Scrub Person: Andrew Hernandez; Edyta Ambrocio    Estimated Blood Loss: minimal    Specimens:                ID Type Source Tests Collected by Time   1 (Not marked as sent) : tip of port catheter aerobic anerobic, fungal cultures  Wound Chest, Left ANAEROBIC CULTURE, WOUND CULTURE Jose Urena MD 12/10/2021 1148         Drains:   [REMOVED] Urethral Catheter (Removed)       [REMOVED] External Urinary Catheter (Removed)       Findings: left chest port removed intact    Complications: none    Indication: Fungemia with indwelling port    Operative Note:    The patient was seen and consent was obtained preoperatively.  Finally she was brought to the operating room and placed in supine position on the OR table.  Anesthesia monitored the patient throughout the procedure.  A preoperative briefing was performed.  The left chest was prepped and draped in normal sterile fashion.  A timeout was then performed.    Local anesthetic was injected surrounding the left chest port.  A skin incision was made overlying the port and carried down through the subcutaneous tissue to the capsule scar tissue surrounding the port.  The capsule scar tissue was then incised exposing the port.  The port was elevated up out of the wound.  Additional scar tissue surrounding the catheter exit site was then identified and divided as well.  This freed up the catheter for removal as well.  3-0 Vicryl suture was then placed around the catheter exit site.  The port and catheter were removed and appeared to be intact.  They were passed off the  field.  The catheter tip was divided away and sent for cultures.  The port was sent to pathology as a gross specimen.    3-0 Vicryl suture the catheter exit site was tied down.  Points of bleeding were controlled with electrocautery.  The wound was then closed in layers using 3-04-0 Vicryl suture.  Exofin was then placed over the skin incision.          This document has been electronically signed by Jose rUena MD on December 10, 2021 11:54 MADHU Urena MD     Date: 12/10/2021  Time: 11:54 EST

## 2021-12-10 NOTE — ANESTHESIA PREPROCEDURE EVALUATION
Anesthesia Evaluation     NPO Solid Status: > 8 hours  NPO Liquid Status: > 8 hours           Airway   Mallampati: II  TM distance: >3 FB  No difficulty expected  Dental      Pulmonary    (+) pneumonia ,   Cardiovascular     (+) hypertension, DVT, hyperlipidemia,       Neuro/Psych  (+) headaches, numbness, psychiatric history,     GI/Hepatic/Renal/Endo      Musculoskeletal     (+) myalgias, neck pain,   Abdominal    Substance History      OB/GYN          Other   arthritis,    history of cancer    ROS/Med Hx Other: From primary note    Pneumonia due to infectious organism, COVID-19 negative, chest x-ray per radiology shows bilateral pulmonary infiltrates, WBC not elevated, continue IV ceftriaxone and IV azithromycin until blood cultures resulted, DuoNebs every 4 hours as needed and albuterol every 6 hours as needed, stable on home 6 L oxygen via nasal cannula. Will follow closely to see if she improves on the ceftriaxone. Discontinue the a azithromycin as her viral panel was negative. She is allergic to penicillins therefore will not change her to something like Zosyn which might help if this were aspiration. We will follow her closely and if need be start Cleocin.     Coumadin toxicity INR greater than 8 with past medical history of DVT, hold home Coumadin for now given vitamin K in ED, INR daily pharmacy to dose warfarin to INR. Her INR is down to 1.42 at 3:49 this afternoon. We will continue to follow and restart with pharmacy's help.     Altered mental status?  Patient appears awake and alert and answers appropriately, she denies confusion, CT head negative for acute changes, mets to brain known     Hypokalemia mild potassium 3.3, potassium protocol in place repeat BNP in a.m. magnesium in a.m.     Ovarian  cancer with mets to bone brain and abdominal, followed by  of oncology, consulted Home meds unverified at this time reorder pending verification pharmacy     Hypertension, controlled on home meds  unverified at this time reorder pending verification from pharmacy monitor BP     Anxiety depression, home meds unverified at this time reorder pending verification from pharmacy                     Anesthesia Plan    ASA 3     MAC     intravenous induction     Anesthetic plan, all risks, benefits, and alternatives have been provided, discussed and informed consent has been obtained with: patient.    Plan discussed with CAA.

## 2021-12-10 NOTE — CASE MANAGEMENT/SOCIAL WORK
Continued Stay Note  LYNNETTE Rees     Patient Name: Tahira Zimmerman  MRN: [id number]  Today's Date: 12/10/2021    Admit Date: 12/8/2021     Discharge Plan     Row Name 12/10/21 1515       Plan    Plan Anticipate routine home, current with [facility], TAISHA order placed. Continuous home O2 through [place]. Watch for need of home IV abx.    Plan Comments PT to eval.  Port removal today due to infection.  IV abx, IVF.  O2 @ 4.5L.  Speech re-evaluation due to possible aspiration.                    Expected Discharge Date and Time     Expected Discharge Date Expected Discharge Time    Dec 13, 2021             Suzie Baker RN

## 2021-12-10 NOTE — PROGRESS NOTES
Hematology/Oncology Inpatient Progress Note    PATIENT NAME: Tahira Zimmerman  : 1955  MRN: [id number]    CHIEF COMPLAINT: Shortness of breath    HISTORY OF PRESENT ILLNESS: Tahira Zimmerman is a 66 y.o. female who presented to [facility] on 2021 with complaints of shortness of breath for the past several days.  Shortness of breath is accompanied with productive cough with yellowish-white sputurm.  Denies fever, chills, diaphoresis, lightheadedness, altered mental status or confusion.  Patient reports being on oxygen at home at 6 to 8 LPM per nasal cannula.  She is currently receiving oral chemotherapy for Metastatic ovarian cancer with mets to brain, bone and various abdominal organs.       21  Hematology/Oncology was consulted for Ovarian cancer with mets to brain, bone and abdomen.  Patient is well known to our clinic and follows care with Dr.Rosaline Ball.  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  She was treated with surgery and then postoperatively with adjuvant chemotherapy consisting of Taxol and carboplatin.  Six months later, she had recurrence of disease intra-abdominally between the rectum and vagina.  She was treated with intraabdominal peritoneal chemotherapy and has been free of disease since that time until 2013 when she had recurrence.  She now has recurrent ovarian cancer with metastasis to brain, colon, abdominal wall and pulmonary involvement.  She was on Carboplatin and Doxil, Taxol, Carbo, Taxotere, single agent Gemzar, norpramin and then was on Doxil and Carboplatin.  Doxil was discontinued due to approaching lifetime dosing.  Patient had progression of disease on single agent topotecan, Alimta and combination chemotherapy with Gemzar plus cisplatin.  CT abdomen and pelvis from 10/20/21 showed progression of metastatic disease within the chest, abdomen and pelvis.   CT head without contrast done 12/8/21 revealed intracranial calcifications which could reflect sequela to treated brain mets.  There is sclerotic area involving the clivus which could relate to known metastatic disease.  There are some sclerotic areas involving the calvarium near the convexity of the head.  Patient is currently on oral Letrozole with her course of brain radiation to the whole brain.       Chemotherapy history:    Doxorubicin Liposomal/Carboplatin: received 16 cycles: 2/15/2019 to 6/11/2020 : life time dose/progression of disease  Topotecan weekly: received 3 cycles : 6/2020 to 12/17/2020: progression of disease  Pemetrexed: Received 3 cycles: 12/23/20 to 3/10/2021: progression of disease  Cisplatin/Gemcitabine:  Received 2 cycles: 3/12/2021 to 5/7/2021- progression of disease  Denosumab (Xgeva) given every 28 days: Received 6 cycles out of 12 cycles- 2/1/21 to 7/16/21  Letrozole (Femara): 2.5mg by mouth daily: Started 9/22/2021: active treatment     She  has a past medical history of Anemia (2013), Cervical disc disorder (2013), Clotting disorder (HCC) (2013), Colon polyp (2013), Deep vein thrombosis (HCC) (2013), Diverticulosis (2013), GERD (gastroesophageal reflux disease) (2016), HL (hearing loss) (2016), Hyperlipidemia (2013), Hypertension, Joint pain (2013), Low back pain (2013), Neuromuscular disorder (HCC) (2015), Osteopenia (2014), Osteoporosis, Ovarian cancer (HCC), Ovarian cancer on left (HCC) (1995), Pneumonia (2010), Spinal stenosis (2013), and Urinary tract infection (2013).     PCP: Noemy Queen MD       I have reviewed and confirmed the accuracy of the patient's history: Chief complaint, HPI, ROS and Subjective as entered by the MA/LPN/RN. Cindy Ball MD 12/10/21     12/10/2021:     Patient seen by general surgery for fungemia in port- port removed today.     History of present illness was reviewed and is unchanged from the previous visit. 12/10/21      Subjective           ROS:    Review of Systems   Constitutional: Positive for fatigue. Negative for activity change, appetite change, chills, diaphoresis, fever and unexpected weight change.   HENT: Negative for congestion, dental problem, ear discharge, ear pain, facial swelling, hearing loss, mouth sores, nosebleeds, sore throat, tinnitus, trouble swallowing and voice change.    Eyes: Negative for photophobia and visual disturbance.   Respiratory: Positive for cough and shortness of breath. Negative for choking, chest tightness and wheezing.    Cardiovascular: Negative for chest pain, palpitations and leg swelling.   Gastrointestinal: Negative for abdominal distention, abdominal pain, constipation, diarrhea, nausea and vomiting.   Endocrine: Negative.    Genitourinary: Negative for decreased urine volume, difficulty urinating, dysuria, flank pain, frequency, hematuria and urgency.   Musculoskeletal: Negative for back pain, gait problem, joint swelling, myalgias, neck pain and neck stiffness.   Skin: Negative for color change, rash and wound.   Neurological: Positive for weakness. Negative for dizziness, tremors, syncope, speech difficulty, numbness and headaches.   Hematological: Negative.    Psychiatric/Behavioral: Negative.         MEDICATIONS:      Scheduled Meds:  calcium 500 mg vitamin D 5 mcg (200 UT), 1 tablet, Oral, BID  cefTRIAXone, 1 g, Intravenous, Q24H  cetirizine, 10 mg, Oral, Nightly  enoxaparin, 1 mg/kg, Subcutaneous, Q12H  erythromycin, , Right Eye, 4x Daily  famotidine, 40 mg, Oral, QAM AC  folic acid, 1,000 mcg, Oral, Daily  guaiFENesin, 1,200 mg, Oral, BID  letrozole, 2.5 mg, Oral, Daily  magnesium oxide, 400 mg, Oral, TID  micafungin (MYCAMINE) IV, 150 mg, Intravenous, Q24H  montelukast, 10 mg, Oral, Nightly  phosphorus, 250 mg, Oral, TID  potassium chloride, 20 mEq, Oral, BID With Meals  pregabalin, 100 mg, Oral, TID  senna-docusate sodium, 2 tablet, Oral, BID  sertraline, 50 mg, Oral, Nightly  sodium  "chloride, 3 mL, Intravenous, Q12H  triamterene-hydrochlorothiazide, 1 tablet, Oral, Daily       Continuous Infusions:  lactated ringers, 20 mL/hr, Last Rate: 20 mL/hr (12/10/21 1125)  Pharmacy to Dose enoxaparin (LOVENOX),   Pharmacy to dose warfarin,   sodium chloride, 75 mL/hr, Last Rate: 75 mL/hr (12/10/21 0915)       PRN Meds:  •  acetaminophen **OR** acetaminophen **OR** acetaminophen  •  albuterol sulfate HFA  •  senna-docusate sodium **AND** polyethylene glycol **AND** bisacodyl **AND** bisacodyl  •  ipratropium-albuterol  •  melatonin  •  ondansetron **OR** ondansetron  •  oxyCODONE  •  Pharmacy to Dose enoxaparin (LOVENOX)  •  Pharmacy to dose warfarin  •  potassium chloride **OR** potassium chloride **OR** potassium chloride  •  sodium chloride  •  sodium chloride  •  temazepam     ALLERGIES:      Allergies   Allergen Reactions   • Morphine Nausea Only and Hives   • Penicillin V Potassium Rash   • Sulfa Antibiotics Rash   • Levaquin [Levofloxacin] Nausea Only and Dizziness     When taken with Coumadin   • Amoxicillin Rash       Objective    VITALS:   /88   Pulse 98   Temp 97.1 °F (36.2 °C)   Resp 20   Ht 165.1 cm (65\")   Wt 69.3 kg (152 lb 11.2 oz)   LMP  (LMP Unknown)   SpO2 96%   BMI 25.41 kg/m²     PHYSICAL EXAM:     Physical Exam  Vitals and nursing note reviewed. Exam conducted with a chaperone present.   Constitutional:       General: She is not in acute distress.     Appearance: Normal appearance. She is obese. She is ill-appearing. She is not toxic-appearing or diaphoretic.   HENT:      Head: Normocephalic and atraumatic.      Right Ear: Tympanic membrane, ear canal and external ear normal.      Left Ear: Tympanic membrane, ear canal and external ear normal.      Nose: Nose normal. No congestion or rhinorrhea.      Mouth/Throat:      Mouth: Mucous membranes are moist.      Pharynx: Oropharynx is clear.   Eyes:      General:         Right eye: Discharge present.         Left eye: No " discharge.      Extraocular Movements: Extraocular movements intact.      Pupils: Pupils are equal, round, and reactive to light.      Comments: Improved- slight redness   Neck:      Vascular: No carotid bruit.   Cardiovascular:      Rate and Rhythm: Regular rhythm. Tachycardia present.      Pulses: Normal pulses.      Heart sounds: Normal heart sounds. No murmur heard.  No friction rub. No gallop.    Pulmonary:      Effort: Pulmonary effort is normal. No respiratory distress.      Breath sounds: No stridor. Wheezing and rhonchi present. No rales.   Chest:      Chest wall: No tenderness.   Abdominal:      General: Abdomen is flat. Bowel sounds are normal. There is no distension.      Palpations: Abdomen is soft. There is no mass.      Tenderness: There is no abdominal tenderness. There is no guarding or rebound.      Hernia: No hernia is present.   Genitourinary:     Comments: deferred  Musculoskeletal:         General: No swelling, tenderness or signs of injury. Normal range of motion.      Cervical back: Normal range of motion and neck supple. No rigidity or tenderness.   Lymphadenopathy:      Cervical: No cervical adenopathy.   Skin:     General: Skin is warm and dry.      Capillary Refill: Capillary refill takes less than 2 seconds.      Coloration: Skin is not jaundiced.      Findings: No erythema or rash.      Comments: S/p Left subclavian port removal- left chest incision   Neurological:      General: No focal deficit present.      Mental Status: She is alert and oriented to person, place, and time. Mental status is at baseline.      Cranial Nerves: No cranial nerve deficit.      Sensory: No sensory deficit.      Motor: No weakness.      Coordination: Coordination normal.      Gait: Gait normal.      Deep Tendon Reflexes: Reflexes normal.   Psychiatric:         Mood and Affect: Mood normal.         Behavior: Behavior normal.         Thought Content: Thought content normal.         Judgment: Judgment normal.          I have reexamined the patient and the results are consistent with the previously documented exam. Cindy Ball MD     RECENT LABS:    Lab Results (last 24 hours)     Procedure Component Value Units Date/Time    Blood Culture - Blood, Arm, Left [[id number]] Collected: 12/10/21 1414    Specimen: Blood from Arm, Left Updated: 12/10/21 1504    Wound Culture - Wound, Chest, Left [[id number]] Collected: 12/10/21 1148    Specimen: Wound from Chest, Left Updated: 12/10/21 1247     Gram Stain Rare (1+) WBCs per low power field      No organisms seen    Fungus Culture - Hardware / Foreign Body, Chest, Left [[id number]] Collected: 12/10/21 1204    Specimen: Hardware / Foreign Body from Chest, Left Updated: 12/10/21 1204    Anaerobic Culture - Wound, Chest, Left [[id number]] Collected: 12/10/21 1148    Specimen: Wound from Chest, Left Updated: 12/10/21 1200    Comprehensive Metabolic Panel [[id number]]  (Abnormal) Collected: 12/10/21 0537    Specimen: Blood Updated: 12/10/21 0640     Glucose 86 mg/dL      BUN 24 mg/dL      Creatinine 0.73 mg/dL      Sodium 136 mmol/L      Potassium 3.9 mmol/L      Chloride 101 mmol/L      CO2 25.0 mmol/L      Calcium 6.7 mg/dL      Total Protein 5.6 g/dL      Albumin 2.40 g/dL      ALT (SGPT) 13 U/L      AST (SGOT) 26 U/L      Alkaline Phosphatase 181 U/L      Total Bilirubin 0.2 mg/dL      eGFR Non African Amer 80 mL/min/1.73      Globulin 3.2 gm/dL      A/G Ratio 0.8 g/dL      BUN/Creatinine Ratio 32.9     Anion Gap 10.0 mmol/L     Narrative:      GFR Normal >60  Chronic Kidney Disease <60  Kidney Failure <15      Protime-INR [[id number]]  (Abnormal) Collected: 12/10/21 0537    Specimen: Blood Updated: 12/10/21 0616     Protime 13.9 Seconds      INR 1.27    Blood Culture - Blood, Chest, Left [[id number]]  (Abnormal) Collected: 12/08/21 1946    Specimen: Blood from Chest, Left Updated: 12/10/21 0611     Blood Culture Candida albicans     Comment: Infectious disease consultation is  highly recommended to rule out distant foci of infection.        Isolated from Aerobic Bottle     Gram Stain Aerobic Bottle Yeast    Blood Culture - Blood, Chest, Left [[id number]]  (Abnormal) Collected: 12/08/21 1910    Specimen: Blood from Chest, Left Updated: 12/10/21 0610     Blood Culture Candida albicans     Comment: Infectious disease consultation is highly recommended to rule out distant foci of infection.        Isolated from Aerobic and Anaerobic Bottles     Gram Stain Aerobic Bottle Yeast      Anaerobic Bottle Yeast    CBC & Differential [[id number]]  (Abnormal) Collected: 12/10/21 0537    Specimen: Blood Updated: 12/10/21 0607    Narrative:      The following orders were created for panel order CBC & Differential.  Procedure                               Abnormality         Status                     ---------                               -----------         ------                     CBC Auto Differential[[id number]]        Abnormal            Final result                 Please view results for these tests on the individual orders.    CBC Auto Differential [[id number]]  (Abnormal) Collected: 12/10/21 0537    Specimen: Blood Updated: 12/10/21 0607     WBC 7.40 10*3/mm3      RBC 3.16 10*6/mm3      Hemoglobin 8.3 g/dL      Hematocrit 25.6 %      MCV 81.1 fL      MCH 26.4 pg      MCHC 32.5 g/dL      RDW 18.4 %      RDW-SD 52.5 fl      MPV 8.7 fL      Platelets 203 10*3/mm3      Neutrophil % 87.0 %      Lymphocyte % 2.6 %      Monocyte % 9.0 %      Eosinophil % 1.3 %      Basophil % 0.1 %      Neutrophils, Absolute 6.40 10*3/mm3      Lymphocytes, Absolute 0.20 10*3/mm3      Monocytes, Absolute 0.70 10*3/mm3      Eosinophils, Absolute 0.10 10*3/mm3      Basophils, Absolute 0.00 10*3/mm3      nRBC 0.0 /100 WBC           PENDING RESULTS:     IMAGING REVIEWED:  CT Head Without Contrast    Result Date: 12/8/2021  1.Intracranial calcifications which have been suggested and could reflect sequela to treated brain  metastases. 2.Hyperostosis frontalis 3.Sclerotic area involving the clivus which could relate to known metastatic disease. There also are some sclerotic areas involving the calvarium near the convexity of the head.  Electronically Signed By-Last Urrutia MD On:12/8/2021 9:00 PM This report was finalized on [id number] by  Last Urrutia MD.    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 2.Mucous plugging in the lower lobes. 3.Similar findings of metastatic disease in the chest, including partially calcified mediastinal lymphadenopathy, subcutaneous nodules, and extensive osseous metastatic disease.    Electronically Signed By-Dary Thapa MD On:12/9/2021 4:57 PM This report was finalized on [id number] by  Dary Thapa MD.    XR Chest 1 View    Result Date: 12/8/2021  1.Bilateral pulmonary infiltrates. Given history of malignancy this might relate to lymphangitic spread of tumor. An underlying inflammatory infectious process would be in the differential. Correlation with clinical findings suggested.  Electronically Signed By-Last Urrutia MD On:12/8/2021 7:02 PM This report was finalized on [id number] by  Last Urrutia MD.      Assessment/Plan   ASSESSMENT:  Recurrent ovarian cancer with metastasis to brain, colon, abdominal wall and pulmonary involvement: She has had  Multiple lines of therapy in the past but most recently she is on Letrozol. Patient is currently on  Palliative XRT to the whole  brain         Chemotherapy history:    Doxorubicin Liposomal/Carboplatin: received 16 cycles: 2/15/2019 to 6/11/2020 : life time dose/progression of disease  Topotecan weekly: received 3 cycles : 6/2020 to 12/17/2020: progression of disease  Pemetrexed: Received 3  cycles: 12/23/20 to 3/10/2021: progression of disease  Cisplatin/Gemcitabine:  Received 2 cycles: 3/12/2021 to 5/7/2021- progression of disease  Denosumab (Xgeva) given every 28 days: Received 6 cycles out of 12 cycles- 2/1/21 to 7/16/21  Letrozole (Femara): 2.5mg by mouth daily: Started 9/22/2021: active treatment     2. Pneumonia due to infectious organism: Covid 19 negative: receiving IV ABT  3. Coumadin toxicity: INR > 8.  Holding coumadin was given Vitamin K in ED. Monitoring INR and coumadin  Per pharmacy: INR 1.27.  4. Hypokalemia: replaced per primary team: on oral supplement  5. History of DVT: on anticoagulation with Coumadin- previously held due to coumadin toxicity- Patient normally takes Coumadin 5mg po daily  6. Pancytopenia: due to chemotherapy: on Procrit  7. Iron deficiency anemia: Hemoglobin 8.3, HCT 27.4, MCV 81.1, MCH 26.4  8. Hypomagnesemia: replaced per primary team  9. Right eye bacterial conjunctivitis: will order Erythromycin eye gtts.   10. Candidemia, PCR detected Candida albicans due to port:      PLAN  1. S/P left subclavian port removal per general surgery  2. IV ABT rocephin   3. CBC/diff hemoglobin 8.3  4. Daily INR - goal 2-3  5. Blood transfusion: Administer 1 unit of PRBC's for hemoglobin <7.5  6. Monitor for bleeding  7. Continue Femara   8.   Continue coumadin dosing per pharmacy with daily INR  9  Continue Erythromycin ophthalmic 0.5% gtts- give to right eye four times a day x7 days  10.  Continue supportive care  11. Will continue to follow           Electronically signed by IRENA Peterson, 12/10/21, 1:59 PM EST.        Patient seen and examined.  Face-to-face evaluation completed.  Note reviewed and edited.  Discussed with nurse practitioner.  Agree with assessment and plan as documented above.  Physical exam today is unchanged from prior.  She is currently on erythromycin ophthalmic drops to the right eye.  She has fungemia and therefore port to be removed today.  She  will continue IV antibiotics.  We will continue to follow.    Electronically signed by Cindy Ball MD, 12/10/21, 5:41 PM EST.

## 2021-12-10 NOTE — THERAPY TREATMENT NOTE
Acute Care - Speech Language Pathology   Swallow Treatment Note LYNNETTE Rees     Patient Name: Tahira Zimmerman  : 1955  MRN: [id number]  Today's Date: 12/10/2021               Admit Date: 2021    Visit Dx:     ICD-10-CM ICD-9-CM   1. Pneumonia due to infectious organism, unspecified laterality, unspecified part of lung  J18.9 486   2. Shortness of breath  R06.02 786.05   3. Abnormal head CT  R93.0 793.0   4. Elevated INR  R79.1 790.92   5. Port-A-Cath in place  Z95.828 V45.89     Patient Active Problem List   Diagnosis   • Malignant neoplasm of right ovary (HCC)   • Hypomagnesemia   • Left upper extremity deep vein thrombosis (HCC)   • Pernicious anemia   • Malabsorption due to intolerance, not elsewhere classified   • MELANI (iron deficiency anemia)   • Pancytopenia due to antineoplastic chemotherapy (HCC)   • Encounter for antineoplastic chemotherapy   • Long term (current) use of anticoagulants   • Monitoring for long-term anticoagulant use   • Leg pain, bilateral   • Osteoarthritis of cervical spine without myelopathy   • Other long term (current) drug therapy   • Pain of metastatic malignancy   • Hyperlipidemia   • Essential hypertension   • Antineoplastic chemotherapy induced anemia   • Cervical disc disorder with radiculopathy   • Restless legs syndrome   • Dysuria   • Fibromyositis   • Port-A-Cath in place   • Encounter for medication management   • Chronic headaches   • Welcome to Medicare preventive visit   • Encounter for screening mammogram for breast cancer   • Postmenopausal   • Screening for diabetes mellitus   • Screening, ischemic heart disease   • Encounter for hepatitis C screening test for low risk patient   • Need for immunization against influenza   • Coumadin toxicity   • Anxiety   • Brain metastases (HCC)   • Deep vein thrombosis (HCC)   • Family history of cerebrovascular accident (CVA)   • Family history of diabetes mellitus   • Insomnia   • Lung nodule   • Mass of skin   •  Osteoporosis   • Shingles   • History of DVT (deep vein thrombosis)   • Depression   • Anemia   • Mastoiditis of left side   • Encounter for care related to vascular access port   • Iron deficiency anemia   • Malabsorption of iron   • Neck pain   • Bone metastases (HCC)   • Pneumonia of right lower lobe due to infectious organism   • Nasal congestion with rhinorrhea   • Cough   • Chest pain   • Elevated international normalized ratio (INR) due to prior anticoagulant medication ingestion   • Mucus plugging of bronchi   • Encephalopathy acute   • Hypotension   • Pneumonia due to COVID-19 virus   • Cytokine release syndrome, grade 2   • Pneumonia due to infectious organism   • Coumadin toxicity   • Hypokalemia   • AMS (altered mental status)     Past Medical History:   Diagnosis Date   • Anemia 2013   • Cervical disc disorder 2013    Herniated disc, pinched nerve   • Clotting disorder (HCC) 2013    Low platelets from chemo   • Colon polyp 2013   • Deep vein thrombosis (HCC) 2013   • Diverticulosis 2013   • GERD (gastroesophageal reflux disease) 2016   • HL (hearing loss) 2016    I need hearing aids   • Hyperlipidemia 2013    Need my cholesterol rechecked   • Hypertension    • Joint pain 2013    Shoulder pain, torn rotator cuff   • Low back pain 2013   • Neuromuscular disorder (HCC) 2015    Shingles, pinched nerves in my neck   • Osteopenia 2014   • Osteoporosis    • Ovarian cancer (HCC)     ovarian--  spread to lungs 10/2020   • Ovarian cancer on left (HCC) 1995   • Pneumonia 2010    Have had it several times   • Spinal stenosis 2013    Cervical   • Urinary tract infection 2013    Have had several utis     Past Surgical History:   Procedure Laterality Date   • BRONCHOSCOPY N/A 2/10/2021    Procedure: BRONCHOSCOPY with bronchioalveolar lavage;  Surgeon: Jerica Esparza MD;  Location: [facility] ENDOSCOPY;  Service: Pulmonary;  Laterality: N/A;  post op:right lobe pneumonia   • BRONCHOSCOPY N/A 6/15/2021    Procedure:  "BRONCHOSCOPY with bronchoalveolar lavage;  Surgeon: Jerica Esparza MD;  Location: [facility] ENDOSCOPY;  Service: Pulmonary;  Laterality: N/A;  lung cancer;pneumonia   • CATARACT EXTRACTION     • COLON SURGERY     • COLONOSCOPY  05/26/2018   • EXPLORATORY LAPAROTOMY     • HERNIA REPAIR     • HYSTERECTOMY     • OOPHORECTOMY            SWALLOW EVALUATION (last 72 hours)     SLP Adult Swallow Evaluation     Row Name 12/10/21 1600          Document Type therapy note (daily note)  -CP    Subjective Information no complaints  -CP    Patient Observations alert; cooperative  -CP    Patient/Family/Caregiver Comments/Observations Pt was alert and responsive. Pt does reports occasional \"strangling\" on liquids when taking large sips.  -CP    Patient Effort good  -CP    Comment Skilled ST targeting dysphagia was conducted today. Pt was seen for meal assessment. Pt was seen feeding herself soup, cheesecake and drinking soda by straw. Pt had functional mastication and timely oral transit of soft solids. Suspect timely swallow. Pt had clear vocal quality after all trials and no cough or other overt s/s of aspiration. Pt tolerating current [facility] soft diet with thin liquids. ST will continue to follow to assure tolerance of diet and make further recs as indicated. ST available to do VFSS if MD feels this is indicated.  -CP          User Key  (r) = Recorded By, (t) = Taken By, (c) = Cosigned By    Initials Name Effective Dates    CP Katie Mera SLP 06/16/21 -                 EDUCATION  The patient has been educated in the following areas:   Dysphagia (Swallowing Impairment).        SLP GOALS     Row Name 12/10/21 1600 12/09/21 1500          Oral Nutrition/Hydration Goal 1 (SLP)    Oral Nutrition/Hydration Goal 1, SLP Patient will be seen at a meal within 24-48 hours to assess tolerance of current diet with further recommendations to be given as indicated.  -CP Patient will be seen at a meal within 24-48 hours to assess tolerance of " current diet with further recommendations to be given as indicated.  -MM     Time Frame (Oral Nutrition/Hydration Goal 1, SLP) 2 days  -CP 2 days  -MM     Barriers (Oral Nutrition/Hydration Goal 1, SLP) See above meal assessment  -CP --     Progress/Outcomes (Oral Nutrition/Hydration Goal 1, SLP) goal ongoing  -CP --            Oral Nutrition/Hydration Goal 2 (SLP)    Oral Nutrition/Hydration Goal 2, SLP Patient will tolerate safest and least restrictive diet with no complications from aspiration.  -CP Patient will tolerate safest and least restrictive diet with no complications from aspiration.  -MM     Time Frame (Oral Nutrition/Hydration Goal 2, SLP) by discharge  -CP by discharge  -MM     Barriers (Oral Nutrition/Hydration Goal 2, SLP) See above note  -CP --     Progress/Outcomes (Oral Nutrition/Hydration Goal 2, SLP) goal ongoing  -CP --           User Key  (r) = Recorded By, (t) = Taken By, (c) = Cosigned By    Initials Name Provider Type    CP Katie Mera, SLP Speech and Language Pathologist    Delores Vasquez, SLP Speech and Language Pathologist                   Time Calculation:                BRANDON Scruggs  12/10/2021

## 2021-12-10 NOTE — CONSULTS
GENERAL SURGERY CONSULT    Referring Provider: jose  Reason for Consultation: fungemia with port in place    Patient Care Team:  Noemy Queen MD as PCP - General (Family Medicine)  Cindy Ball MD as Consulting Physician (Hematology and Oncology)    Chief complaint: shortness of breath, now with fungemia, port in place    Subjective .     History of present illness:  67 yo lady who currently has a left chest port in place for the past 8 years. Has been used in treatment of her metastatic ovarian cancer. She currently has fungemia and I have been asked to remove her port. She is agreeable to this. She had Coumadin yesterday but her INR is 1.27 today.    Review of Systems    Review of Systems   Respiratory: Positive for shortness of breath.          History  Past Medical History:   Diagnosis Date   • Anemia 2013   • Cervical disc disorder 2013    Herniated disc, pinched nerve   • Clotting disorder (HCC) 2013    Low platelets from chemo   • Colon polyp 2013   • Deep vein thrombosis (HCC) 2013   • Diverticulosis 2013   • GERD (gastroesophageal reflux disease) 2016   • HL (hearing loss) 2016    I need hearing aids   • Hyperlipidemia 2013    Need my cholesterol rechecked   • Hypertension    • Joint pain 2013    Shoulder pain, torn rotator cuff   • Low back pain 2013   • Neuromuscular disorder (HCC) 2015    Shingles, pinched nerves in my neck   • Osteopenia 2014   • Osteoporosis    • Ovarian cancer (HCC)     ovarian--  spread to lungs 10/2020   • Ovarian cancer on left (HCC) 1995   • Pneumonia 2010    Have had it several times   • Spinal stenosis 2013    Cervical   • Urinary tract infection 2013    Have had several utis   ,   Past Surgical History:   Procedure Laterality Date   • BRONCHOSCOPY N/A 2/10/2021    Procedure: BRONCHOSCOPY with bronchioalveolar lavage;  Surgeon: Jerica Esparza MD;  Location: [facility] ENDOSCOPY;  Service: Pulmonary;  Laterality: N/A;  post op:right lobe pneumonia   • BRONCHOSCOPY N/A  6/15/2021    Procedure: BRONCHOSCOPY with bronchoalveolar lavage;  Surgeon: Jerica Esparza MD;  Location: [facility] ENDOSCOPY;  Service: Pulmonary;  Laterality: N/A;  lung cancer;pneumonia   • CATARACT EXTRACTION     • COLON SURGERY     • COLONOSCOPY  05/26/2018   • EXPLORATORY LAPAROTOMY     • HERNIA REPAIR     • HYSTERECTOMY     • OOPHORECTOMY     ,   Family History   Problem Relation Age of Onset   • Hypertension Mother    • Cancer Father    • Hypertension Father    • Hypertension Sister    • Hypertension Brother    • Stroke Brother    ,   Social History     Tobacco Use   • Smoking status: Never Smoker   • Smokeless tobacco: Never Used   Vaping Use   • Vaping Use: Never used   Substance Use Topics   • Alcohol use: No     Comment: caffeine 32-64oz qd   • Drug use: No   ,   Medications Prior to Admission   Medication Sig Dispense Refill Last Dose   • folic acid (FOLVITE) 1 MG tablet Take 1 tablet by mouth Daily. 30 tablet 1    • acetaminophen (TYLENOL) 500 MG tablet Take 1,000 mg by mouth Every 6 (Six) Hours As Needed for Mild Pain .      • Calcium Carbonate 1500 (600 Ca) MG tablet Take 600 mg by mouth 2 (Two) Times a Day.      • calcium carbonate-vitamin d 600-400 MG-UNIT per tablet Take 1 tablet by mouth 2 (Two) Times a Day. 60 tablet 3    • cetirizine (zyrTEC) 10 MG tablet Take 1 tablet by mouth Every Night. 30 tablet 0    • Combivent Respimat  MCG/ACT inhaler INHALE 1 PUFF FOUR TIMES DAILY AS NEEDED FOR SHORTNESS OF BREATH      • cyanocobalamin 1000 MCG/ML injection Inject 1,000 mcg into the appropriate muscle as directed by prescriber Every 28 (Twenty-Eight) Days.      • famotidine (PEPCID) 40 MG tablet Take 1 tablet by mouth Every Morning Before Breakfast. 90 tablet 1    • HYDROcodone-homatropine (HYCODAN) 5-1.5 MG/5ML syrup Take 5 mL by mouth Every 8 (Eight) Hours As Needed for Cough. 473 mL 0    • letrozole (FEMARA) 2.5 MG tablet Take 1 tablet by mouth Daily. 30 tablet 6    • MAGnesium-Oxide 400 (241.3 Mg)  MG tablet tablet Take 1 tablet by mouth 3 (Three) Times a Day. 30 each 3    • Misc. Devices (Commode Bedside) misc 1 Device Daily. 1 each 0    • Misc. Devices (Roller Walker) misc 1 Device Daily. 1 each 0    • montelukast (SINGULAIR) 10 MG tablet TAKE 1 TABLET BY MOUTH EVERY NIGHT 30 tablet 2    • Mucus Relief  MG 12 hr tablet TAKE TWO TABLETS BY MOUTH TWICE A DAY 60 tablet 1    • ondansetron (ZOFRAN) 8 MG tablet       • oxyCODONE (ROXICODONE) 20 MG tablet Take 1 tablet by mouth Every 4 (Four) Hours As Needed for Moderate Pain . 180 tablet 0    • oxyCODONE (ROXICODONE) 20 MG tablet Take 1 to 1.5 tabs every 4 hours as needed for moderate pain. 270 tablet 0    • phosphorus (K PHOS NEUTRAL) 155-852-130 MG tablet Take 1 tablet by mouth 3 (Three) Times a Day. 90 tablet 3    • potassium chloride (K-DUR,KLOR-CON) 20 MEQ CR tablet Take 60 mEq by mouth Daily.      • potassium chloride (K-DUR,KLOR-CON) 20 MEQ CR tablet Take 2 tablets by mouth Every Night. 60 tablet 3    • pregabalin (LYRICA) 100 MG capsule Take 1 capsule by mouth 3 (Three) Times a Day. 15 capsule 0    • sertraline (ZOLOFT) 50 MG tablet Take 50 mg by mouth Every Night.      • temazepam (RESTORIL) 30 MG capsule TAKE 1 CAPSULE BY MOUTH AT NIGHT AS NEEDED FOR SLEEP 30 capsule 1    • triamterene-hydrochlorothiazide (DYAZIDE) 37.5-25 MG per capsule Take 1 capsule by mouth Daily. 90 capsule 1    • warfarin (COUMADIN) 4 MG tablet Take as directed 30 tablet 3    , Scheduled Meds:  [MAR Hold] calcium 500 mg vitamin D 5 mcg (200 UT), 1 tablet, Oral, BID  cefTRIAXone, 1 g, Intravenous, Q24H  [MAR Hold] cetirizine, 10 mg, Oral, Nightly  [MAR Hold] erythromycin, , Right Eye, 4x Daily  famotidine, 40 mg, Oral, QAM AC  [MAR Hold] folic acid, 1,000 mcg, Oral, Daily  [MAR Hold] guaiFENesin, 1,200 mg, Oral, BID  [MAR Hold] letrozole, 2.5 mg, Oral, Daily  [MAR Hold] magnesium oxide, 400 mg, Oral, TID  micafungin (MYCAMINE) IV, 150 mg, Intravenous, Q24H  [MAR Hold]  montelukast, 10 mg, Oral, Nightly  [MAR Hold] phosphorus, 250 mg, Oral, TID  [MAR Hold] potassium chloride, 20 mEq, Oral, BID With Meals  pregabalin, 100 mg, Oral, TID  [MAR Hold] senna-docusate sodium, 2 tablet, Oral, BID  [MAR Hold] sertraline, 50 mg, Oral, Nightly  [MAR Hold] sodium chloride, 3 mL, Intravenous, Q12H  [MAR Hold] triamterene-hydrochlorothiazide, 1 tablet, Oral, Daily    , Continuous Infusions:  lactated ringers, 20 mL/hr, Last Rate: 20 mL/hr (12/10/21 1034)  Pharmacy to dose warfarin,   sodium chloride, 75 mL/hr, Last Rate: 75 mL/hr (12/10/21 0915)    , PRN Meds:  •  [MAR Hold] acetaminophen **OR** [MAR Hold] acetaminophen **OR** [MAR Hold] acetaminophen  •  [MAR Hold] albuterol sulfate HFA  •  [MAR Hold] senna-docusate sodium **AND** [MAR Hold] polyethylene glycol **AND** [MAR Hold] bisacodyl **AND** [MAR Hold] bisacodyl  •  [MAR Hold] ipratropium-albuterol  •  [MAR Hold] melatonin  •  [MAR Hold] ondansetron **OR** [MAR Hold] ondansetron  •  [MAR Hold] oxyCODONE  •  Pharmacy to dose warfarin  •  [MAR Hold] potassium chloride **OR** [MAR Hold] potassium chloride **OR** [MAR Hold] potassium chloride  •  [MAR Hold] sodium chloride  •  [MAR Hold] sodium chloride  •  [MAR Hold] temazepam and Allergies:  Morphine, Penicillin v potassium, Sulfa antibiotics, Levaquin [levofloxacin], and Amoxicillin    Objective     Vital Signs   Temp:  [97.1 °F (36.2 °C)-98.3 °F (36.8 °C)] 97.1 °F (36.2 °C)  Heart Rate:  [69-93] 69  Resp:  [14-25] 25  BP: (122-130)/(77-78) 130/77    Physical Exam:       General Appearance:    Alert, cooperative, in no acute distress   Head:    Normocephalic, without obvious abnormality, atraumatic   Eyes:            Lids and lashes normal, conjunctivae and sclerae normal, no   icterus, no pallor, corneas clear   Ears:    Ears appear intact with no abnormalities noted   Lungs:     Breathing unlabored   Chest Wall:    Left chest port in place       Results Review:  Lab Results (last 72  hours)     Procedure Component Value Units Date/Time    Comprehensive Metabolic Panel [[id number]]  (Abnormal) Collected: 12/10/21 0537    Specimen: Blood Updated: 12/10/21 0640     Glucose 86 mg/dL      BUN 24 mg/dL      Creatinine 0.73 mg/dL      Sodium 136 mmol/L      Potassium 3.9 mmol/L      Chloride 101 mmol/L      CO2 25.0 mmol/L      Calcium 6.7 mg/dL      Total Protein 5.6 g/dL      Albumin 2.40 g/dL      ALT (SGPT) 13 U/L      AST (SGOT) 26 U/L      Alkaline Phosphatase 181 U/L      Total Bilirubin 0.2 mg/dL      eGFR Non African Amer 80 mL/min/1.73      Globulin 3.2 gm/dL      A/G Ratio 0.8 g/dL      BUN/Creatinine Ratio 32.9     Anion Gap 10.0 mmol/L     Narrative:      GFR Normal >60  Chronic Kidney Disease <60  Kidney Failure <15      Protime-INR [[id number]]  (Abnormal) Collected: 12/10/21 0537    Specimen: Blood Updated: 12/10/21 0616     Protime 13.9 Seconds      INR 1.27    Blood Culture - Blood, Chest, Left [[id number]]  (Abnormal) Collected: 12/08/21 1946    Specimen: Blood from Chest, Left Updated: 12/10/21 0611     Blood Culture Candida albicans     Comment: Infectious disease consultation is highly recommended to rule out distant foci of infection.        Isolated from Aerobic Bottle     Gram Stain Aerobic Bottle Yeast    Blood Culture - Blood, Chest, Left [[id number]]  (Abnormal) Collected: 12/08/21 1910    Specimen: Blood from Chest, Left Updated: 12/10/21 0610     Blood Culture Candida albicans     Comment: Infectious disease consultation is highly recommended to rule out distant foci of infection.        Isolated from Aerobic and Anaerobic Bottles     Gram Stain Aerobic Bottle Yeast      Anaerobic Bottle Yeast    CBC & Differential [[id number]]  (Abnormal) Collected: 12/10/21 0537    Specimen: Blood Updated: 12/10/21 0607    Narrative:      The following orders were created for panel order CBC & Differential.  Procedure                               Abnormality         Status                      ---------                               -----------         ------                     CBC Auto Differential[[id number]]        Abnormal            Final result                 Please view results for these tests on the individual orders.    CBC Auto Differential [[id number]]  (Abnormal) Collected: 12/10/21 0537    Specimen: Blood Updated: 12/10/21 0607     WBC 7.40 10*3/mm3      RBC 3.16 10*6/mm3      Hemoglobin 8.3 g/dL      Hematocrit 25.6 %      MCV 81.1 fL      MCH 26.4 pg      MCHC 32.5 g/dL      RDW 18.4 %      RDW-SD 52.5 fl      MPV 8.7 fL      Platelets 203 10*3/mm3      Neutrophil % 87.0 %      Lymphocyte % 2.6 %      Monocyte % 9.0 %      Eosinophil % 1.3 %      Basophil % 0.1 %      Neutrophils, Absolute 6.40 10*3/mm3      Lymphocytes, Absolute 0.20 10*3/mm3      Monocytes, Absolute 0.70 10*3/mm3      Eosinophils, Absolute 0.10 10*3/mm3      Basophils, Absolute 0.00 10*3/mm3      nRBC 0.0 /100 WBC     Protime-INR [[id number]]  (Abnormal) Collected: 12/09/21 1456    Specimen: Blood Updated: 12/09/21 1549     Protime 15.4 Seconds      INR 1.42    Blood Culture ID, PCR - Blood, Chest, Left [[id number]]  (Abnormal) Collected: 12/08/21 1910    Specimen: Blood from Chest, Left Updated: 12/09/21 1422     BCID, PCR Candida albicans. Identification by BCID2 PCR     BOTTLE TYPE Aerobic Bottle    Narrative:      Infectious disease consultation is highly recommended to rule out distant foci of infection.    Basic Metabolic Panel [[id number]]  (Abnormal) Collected: 12/09/21 0529    Specimen: Blood Updated: 12/09/21 0602     Glucose 120 mg/dL      BUN 29 mg/dL      Creatinine 0.95 mg/dL      Sodium 134 mmol/L      Potassium 4.1 mmol/L      Chloride 99 mmol/L      CO2 26.0 mmol/L      Calcium 6.8 mg/dL      eGFR Non African Amer 59 mL/min/1.73      BUN/Creatinine Ratio 30.5     Anion Gap 9.0 mmol/L     Narrative:      GFR Normal >60  Chronic Kidney Disease <60  Kidney Failure <15      Magnesium [[id number]]  (Abnormal)  Collected: 12/09/21 0529    Specimen: Blood Updated: 12/09/21 0602     Magnesium 1.2 mg/dL     Protime-INR [[id number]]  (Normal) Collected: 12/09/21 0529    Specimen: Blood Updated: 12/09/21 0600     Protime 23.3 Seconds      INR 2.23    CBC & Differential [[id number]]  (Abnormal) Collected: 12/09/21 0529    Specimen: Blood Updated: 12/09/21 0544    Narrative:      The following orders were created for panel order CBC & Differential.  Procedure                               Abnormality         Status                     ---------                               -----------         ------                     CBC Auto Differential[[id number]]        Abnormal            Final result                 Please view results for these tests on the individual orders.    CBC Auto Differential [[id number]]  (Abnormal) Collected: 12/09/21 0529    Specimen: Blood Updated: 12/09/21 0544     WBC 5.90 10*3/mm3      RBC 3.40 10*6/mm3      Hemoglobin 8.8 g/dL      Hematocrit 27.4 %      MCV 80.7 fL      MCH 25.9 pg      MCHC 32.0 g/dL      RDW 18.4 %      RDW-SD 52.1 fl      MPV 8.5 fL      Platelets 232 10*3/mm3      Neutrophil % 85.8 %      Lymphocyte % 2.7 %      Monocyte % 9.7 %      Eosinophil % 1.7 %      Basophil % 0.1 %      Neutrophils, Absolute 5.00 10*3/mm3      Lymphocytes, Absolute 0.20 10*3/mm3      Monocytes, Absolute 0.60 10*3/mm3      Eosinophils, Absolute 0.10 10*3/mm3      Basophils, Absolute 0.00 10*3/mm3      nRBC 0.0 /100 WBC     BNP [[id number]]  (Normal) Collected: 12/08/21 1910    Specimen: Blood Updated: 12/08/21 2110     proBNP 693.0 pg/mL     Narrative:      Among patients with dyspnea, NT-proBNP is highly sensitive for the detection of acute congestive heart failure. In addition NT-proBNP of <300 pg/ml effectively rules out acute congestive heart failure with 99% negative predictive value.    Results may be falsely decreased if patient taking Biotin.      Troponin [[id number]]  (Normal) Collected: 12/08/21 1910     Specimen: Blood Updated: 12/08/21 2110     Troponin T <0.010 ng/mL     Narrative:      Troponin T Reference Range:  <= 0.03 ng/mL-   Negative for AMI  >0.03 ng/mL-     Abnormal for myocardial necrosis.  Clinicians would have to utilize clinical acumen, EKG, Troponin and serial changes to determine if it is an Acute Myocardial Infarction or myocardial injury due to an underlying chronic condition.       Results may be falsely decreased if patient taking Biotin.      Comprehensive Metabolic Panel [[id number]]  (Abnormal) Collected: 12/08/21 1910    Specimen: Blood Updated: 12/08/21 2102     Glucose 155 mg/dL      BUN 31 mg/dL      Creatinine 1.01 mg/dL      Sodium 134 mmol/L      Potassium 3.3 mmol/L      Chloride 97 mmol/L      CO2 25.0 mmol/L      Calcium 7.0 mg/dL      Total Protein 6.7 g/dL      Albumin 2.60 g/dL      ALT (SGPT) 10 U/L      AST (SGOT) 22 U/L      Alkaline Phosphatase 209 U/L      Total Bilirubin 0.3 mg/dL      eGFR Non African Amer 55 mL/min/1.73      Globulin 4.1 gm/dL      A/G Ratio 0.6 g/dL      BUN/Creatinine Ratio 30.7     Anion Gap 12.0 mmol/L     Narrative:      GFR Normal >60  Chronic Kidney Disease <60  Kidney Failure <15      Respiratory Panel PCR w/COVID-19(SARS-CoV-2) JAMAL/ROLF/ABENA/PAD/COR/MAD/MICHAEL In-House, NP Swab in UTM/VTM, 3-4 HR TAT - Swab, Nasopharynx [[id number]]  (Normal) Collected: 12/08/21 1910    Specimen: Swab from Nasopharynx Updated: 12/08/21 2029     ADENOVIRUS, PCR Not Detected     Coronavirus 229E Not Detected     Coronavirus HKU1 Not Detected     Coronavirus NL63 Not Detected     Coronavirus OC43 Not Detected     COVID19 Not Detected     Human Metapneumovirus Not Detected     Human Rhinovirus/Enterovirus Not Detected     Influenza A PCR Not Detected     Influenza B PCR Not Detected     Parainfluenza Virus 1 Not Detected     Parainfluenza Virus 2 Not Detected     Parainfluenza Virus 3 Not Detected     Parainfluenza Virus 4 Not Detected     RSV, PCR Not Detected      Bordetella pertussis pcr Not Detected     Bordetella parapertussis PCR Not Detected     Chlamydophila pneumoniae PCR Not Detected     Mycoplasma pneumo by PCR Not Detected    Narrative:      In the setting of a positive respiratory panel with a viral infection PLUS a negative procalcitonin without other underlying concern for bacterial infection, consider observing off antibiotics or discontinuation of antibiotics and continue supportive care. If the respiratory panel is positive for atypical bacterial infection (Bordetella pertussis, Chlamydophila pneumoniae, or Mycoplasma pneumoniae), consider antibiotic de-escalation to target atypical bacterial infection.    [place] Draw [[id number]] Collected: 12/08/21 1910    Specimen: Blood Updated: 12/08/21 2015    Narrative:      The following orders were created for panel order [place] Draw.  Procedure                               Abnormality         Status                     ---------                               -----------         ------                     Green Top (Gel)[[id number]]                                  Final result               Lavender Top[[id number]]                                     Final result               Gold Top - SST[[id number]]                                   Final result               Light Blue Top[[id number]]                                   Final result                 Please view results for these tests on the individual orders.    Lavender Top [[id number]] Collected: 12/08/21 1910    Specimen: Blood Updated: 12/08/21 2015     Extra Tube hold for add-on     Comment: Auto resulted       Gold Top - SST [[id number]] Collected: 12/08/21 1910    Specimen: Blood Updated: 12/08/21 2015     Extra Tube Hold for add-ons.     Comment: Auto resulted.       Light Blue Top [[id number]] Collected: 12/08/21 1910    Specimen: Blood Updated: 12/08/21 2015     Extra Tube hold for add-on     Comment: Auto resulted       Green Top (Gel) [[id number]] Collected:  12/08/21 1910    Specimen: Blood Updated: 12/08/21 2015     Extra Tube Hold for add-ons.     Comment: Auto resulted.       Urinalysis, Microscopic Only - Urine, Catheter In/Out [[id number]]  (Abnormal) Collected: 12/08/21 1948    Specimen: Urine, Catheter In/Out Updated: 12/08/21 1959     RBC, UA 21-30 /HPF      WBC, UA 0-2 /HPF      Bacteria, UA None Seen /HPF      Squamous Epithelial Cells, UA 0-2 /HPF      Hyaline Casts, UA 3-6 /LPF      Methodology Automated Microscopy    Urinalysis With Culture If Indicated - Urine, Catheter In/Out [[id number]]  (Abnormal) Collected: 12/08/21 1948    Specimen: Urine, Catheter In/Out Updated: 12/08/21 1957     Color, UA Yellow     Appearance, UA Cloudy     Comment: Result checked         pH, UA 5.5     Specific Gravity, UA 1.020     Glucose, UA Negative     Ketones, UA Negative     Bilirubin, UA Negative     Blood, UA Small (1+)     Protein, UA 30 mg/dL (1+)     Leuk Esterase, UA Negative     Nitrite, UA Negative     Urobilinogen, UA 0.2 E.U./dL    Protime-INR [[id number]]  (Abnormal) Collected: 12/08/21 1910    Specimen: Blood Updated: 12/08/21 1936     Protime 78.6 Seconds      INR >=8.00    CBC & Differential [[id number]]  (Abnormal) Collected: 12/08/21 1910    Specimen: Blood Updated: 12/08/21 1928    Narrative:      The following orders were created for panel order CBC & Differential.  Procedure                               Abnormality         Status                     ---------                               -----------         ------                     CBC Auto Differential[[id number]]        Abnormal            Final result                 Please view results for these tests on the individual orders.    CBC Auto Differential [[id number]]  (Abnormal) Collected: 12/08/21 1910    Specimen: Blood Updated: 12/08/21 1928     WBC 4.20 10*3/mm3      RBC 3.67 10*6/mm3      Hemoglobin 9.6 g/dL      Hematocrit 29.6 %      MCV 80.6 fL      MCH 26.0 pg      MCHC 32.3 g/dL      RDW 18.4 %       RDW-SD 51.6 fl      MPV 8.4 fL      Platelets 222 10*3/mm3      Neutrophil % 87.2 %      Lymphocyte % 8.0 %      Monocyte % 4.5 %      Eosinophil % 0.2 %      Basophil % 0.1 %      Neutrophils, Absolute 3.70 10*3/mm3      Lymphocytes, Absolute 0.30 10*3/mm3      Monocytes, Absolute 0.20 10*3/mm3      Eosinophils, Absolute 0.00 10*3/mm3      Basophils, Absolute 0.00 10*3/mm3      nRBC 0.1 /100 WBC     POC Lactate [[id number]]  (Normal) Collected: 12/08/21 1915    Specimen: Blood Updated: 12/08/21 1916     Lactate 1.7 mmol/L      Comment: Serial Number: [id number]:  [id number]           Imaging Results (Last 72 Hours)     Procedure Component Value Units Date/Time    CT Chest Without Contrast Diagnostic [[id number]] Collected: 12/09/21 1645     Updated: 12/09/21 1700    Narrative:      CT CHEST WO CONTRAST DIAGNOSTIC-     Date of Exam: 12/9/2021 4:05 PM     Indication: Diffuse interstitial infiltrates; J18.9-Pneumonia,  unspecified organism; R06.02-Shortness of breath; R93.0-Abnormal  findings on diagnostic imaging of skull and head, not elsewhere  classified; R79.1-Abnormal coagulation profile; Z95.828-Presence of  other vascular implants and grafts.  History of ovarian cancer.     Comparison: [facility] chest x-ray 12/08/2021, CT chest 10/20/2021     Technique: Serial and axial CT images of the chest were obtained.  Reconstructions in the coronal and sagittal planes were performed.   Automated exposure control and iterative reconstruction methods were  used.     FINDINGS:  Partially calcified mediastinal adenopathy is not significantly changed.  Index preaortic, retrosternal lymph node on axial image 33 measures 14 x  12 mm, previously 12 x 12 mm. Main pulmonary artery remains mildly  dilated. Heart size is mildly enlarged. There is no pericardial or  pleural effusion. Interstitial opacities are redemonstrated in both  lungs, but there are new areas of patchy consolidation in the lung  apices, right upper lobe,  right middle lobe, and left lower lobe, as  well as increased ground glass opacities. Mucous plugging in the lower  lobes appears increased. Partially included solid organs of the upper  abdomen appear unremarkable for noncontrast CT. Subcutaneous nodules in  the chest and included upper abdomen have not significantly changed.  Index nodule anterior to the left side of the sternal on axial image 31  measures 12 mm, previously 13 mm. Extensive sclerotic osseous metastatic  disease has not significantly progressed. No pathologic vertebral  fractures are seen. Evaluation for rib fractures is limited due to  motion.       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  2.Mucous plugging in the lower lobes.  3.Similar findings of metastatic disease in the chest, including  partially calcified mediastinal lymphadenopathy, subcutaneous nodules,  and extensive osseous metastatic disease.           Electronically Signed By-Dary Thapa MD On:12/9/2021 4:57 PM  This report was finalized on [id number] by  Dary Thapa MD.    CT Head Without Contrast [[id number]] Collected: 12/08/21 2055     Updated: 12/08/21 2102    Narrative:         DATE OF EXAM:  12/8/2021 8:30 PM     PROCEDURE:   CT HEAD WO CONTRAST-     INDICATIONS:   Mental status change, unknown cause history of brain metastases. Stage  IV ovarian cancer.     COMPARISON:  08/25/2021     TECHNIQUE:   Routine transaxial cuts were obtained through the head without the  administration of contrast. Automated exposure control and iterative  reconstruction methods were used.      FINDINGS:  The ventricles are not dilated. There our calcific densities in the left  parietal area. There is a new small calcific density along the wall of  the more anterior right lateral  ventricle. There is a calcification in  the left cerebellar area which has been noted. There is no unusual  extra-axial fluid collection or significant change to the white matter.  There is hyperostosis frontalis. There is a sclerotic area involving the  clivus on the left that could relate to metastasis in this area and has  been noted. There additionally are some sclerotic areas involving the  calvarium near the convexity of the head.       Impression:      1.Intracranial calcifications which have been suggested and could  reflect sequela to treated brain metastases.  2.Hyperostosis frontalis  3.Sclerotic area involving the clivus which could relate to known  metastatic disease. There also are some sclerotic areas involving the  calvarium near the convexity of the head.     Electronically Signed By-Last Urrutia MD On:12/8/2021 9:00 PM  This report was finalized on [id number] by  Last Urrutia MD.    XR Chest 1 View [[id number]] Collected: 12/08/21 1901     Updated: 12/08/21 1904    Narrative:      DATE OF EXAM:  12/8/2021 6:40 PM     PROCEDURE:  XR CHEST 1 VW-     INDICATIONS:  Simple Sepsis Protocol     COMPARISON:  08/25/2021     TECHNIQUE:   Single radiographic AP view of the chest was obtained.     FINDINGS:  There is a port catheter noted with its tip in the superior vena cava.  The heart is not definitely enlarged. There are diffuse infiltrates  within the lungs has been suggested. Given history of ovarian cancer  this may be malignant in nature or secondary to inflammatory infectious  process and should be correlated clinically. There are no large pleural  effusions.        Impression:      1.Bilateral pulmonary infiltrates. Given history of malignancy this  might relate to lymphangitic spread of tumor. An underlying inflammatory  infectious process would be in the differential. Correlation with  clinical findings suggested.     Electronically Signed By-Last Urrutia MD On:12/8/2021 7:02 PM  This report  was finalized on [id number] by  Last Urrutia MD.          I reviewed the patient's new clinical results.  I reviewed the patient's new imaging results and agree with the interpretation.  I reviewed the patient's other test results and agree with the interpretation      Assessment/Plan       Port-A-Cath in place    Malignant neoplasm of right ovary (HCC)    Brain metastases (HCC)    Depression    Bone metastases (HCC)    Pneumonia due to infectious organism    Coumadin toxicity    Hypokalemia    AMS (altered mental status)      65 yo lady with fungemia, port in place.    Will plan to remove port today.  The risks and benefits of port removal have been discussed.    I discussed the patient's findings and my recommendations with patient              This document has been electronically signed by Jose Urena MD on December 10, 2021 10:58 EST      Jose Urena MD  12/10/21  10:58 EST

## 2021-12-11 NOTE — PROGRESS NOTES
Hematology/Oncology Inpatient Progress Note    PATIENT NAME: Tahira Zimmerman  : 1955  MRN: [id number]    CHIEF COMPLAINT: Shortness of breath    HISTORY OF PRESENT ILLNESS: Tahira Zimmerman is a 66 y.o. female who presented to [facility] on 2021 with complaints of shortness of breath for the past several days.  Shortness of breath is accompanied with productive cough with yellowish-white sputurm.  Denies fever, chills, diaphoresis, lightheadedness, altered mental status or confusion.  Patient reports being on oxygen at home at 6 to 8 LPM per nasal cannula.  She is currently receiving oral chemotherapy for Metastatic ovarian cancer with mets to brain, bone and various abdominal organs.       21  Hematology/Oncology was consulted for Ovarian cancer with mets to brain, bone and abdomen.  Patient is well known to our clinic and follows care with Dr.Rosaline Ball.  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  She was treated with surgery and then postoperatively with adjuvant chemotherapy consisting of Taxol and carboplatin.  Six months later, she had recurrence of disease intra-abdominally between the rectum and vagina.  She was treated with intraabdominal peritoneal chemotherapy and has been free of disease since that time until 2013 when she had recurrence.  She now has recurrent ovarian cancer with metastasis to brain, colon, abdominal wall and pulmonary involvement.  She was on Carboplatin and Doxil, Taxol, Carbo, Taxotere, single agent Gemzar, norpramin and then was on Doxil and Carboplatin.  Doxil was discontinued due to approaching lifetime dosing.  Patient had progression of disease on single agent topotecan, Alimta and combination chemotherapy with Gemzar plus cisplatin.  CT abdomen and pelvis from 10/20/21 showed progression of metastatic disease within the chest, abdomen and pelvis.   CT head without contrast done 12/8/21 revealed intracranial calcifications which could reflect sequela to treated brain mets.  There is sclerotic area involving the clivus which could relate to known metastatic disease.  There are some sclerotic areas involving the calvarium near the convexity of the head.  Patient is currently on oral Letrozole with her course of brain radiation to the whole brain.       Chemotherapy history:    Doxorubicin Liposomal/Carboplatin: received 16 cycles: 2/15/2019 to 6/11/2020 : life time dose/progression of disease  Topotecan weekly: received 3 cycles : 6/2020 to 12/17/2020: progression of disease  Pemetrexed: Received 3 cycles: 12/23/20 to 3/10/2021: progression of disease  Cisplatin/Gemcitabine:  Received 2 cycles: 3/12/2021 to 5/7/2021- progression of disease  Denosumab (Xgeva) given every 28 days: Received 6 cycles out of 12 cycles- 2/1/21 to 7/16/21  Letrozole (Femara): 2.5mg by mouth daily: Started 9/22/2021: active treatment     She  has a past medical history of Anemia (2013), Cervical disc disorder (2013), Clotting disorder (HCC) (2013), Colon polyp (2013), Deep vein thrombosis (HCC) (2013), Diverticulosis (2013), GERD (gastroesophageal reflux disease) (2016), HL (hearing loss) (2016), Hyperlipidemia (2013), Hypertension, Joint pain (2013), Low back pain (2013), Neuromuscular disorder (HCC) (2015), Osteopenia (2014), Osteoporosis, Ovarian cancer (HCC), Ovarian cancer on left (HCC) (1995), Pneumonia (2010), Spinal stenosis (2013), and Urinary tract infection (2013).     PCP: Noemy Queen MD       I have reviewed and confirmed the accuracy of the patient's history: Chief complaint, HPI, ROS and Subjective as entered by the MA/LPN/RN. Cindy Ball MD 12/11/21     12/10/2021:         History of present illness was reviewed and is unchanged from the previous visit. 12/10/21      Subjective      Patient has no new issues. Slowly improving.    ROS:    Review of  Systems   Constitutional: Positive for fatigue. Negative for activity change, appetite change, chills, diaphoresis, fever and unexpected weight change.   HENT: Negative for congestion, dental problem, ear discharge, ear pain, facial swelling, hearing loss, mouth sores, nosebleeds, sore throat, tinnitus, trouble swallowing and voice change.    Eyes: Negative for photophobia and visual disturbance.   Respiratory: Positive for cough and shortness of breath. Negative for choking, chest tightness and wheezing.    Cardiovascular: Negative for chest pain, palpitations and leg swelling.   Gastrointestinal: Negative for abdominal distention, abdominal pain, constipation, diarrhea, nausea and vomiting.   Endocrine: Negative.    Genitourinary: Negative for decreased urine volume, difficulty urinating, dysuria, flank pain, frequency, hematuria and urgency.   Musculoskeletal: Negative for back pain, gait problem, joint swelling, myalgias, neck pain and neck stiffness.   Skin: Negative for color change, rash and wound.   Neurological: Positive for weakness. Negative for dizziness, tremors, syncope, speech difficulty, numbness and headaches.   Hematological: Negative.    Psychiatric/Behavioral: Negative.         MEDICATIONS:      Scheduled Meds:  calcium 500 mg vitamin D 5 mcg (200 UT), 1 tablet, Oral, BID  cefTRIAXone, 1 g, Intravenous, Q24H  cetirizine, 10 mg, Oral, Nightly  enoxaparin, 1 mg/kg, Subcutaneous, Q12H  erythromycin, , Right Eye, 4x Daily  famotidine, 40 mg, Oral, QAM AC  folic acid, 1,000 mcg, Oral, Daily  guaiFENesin, 1,200 mg, Oral, BID  letrozole, 2.5 mg, Oral, Daily  magnesium oxide, 400 mg, Oral, TID  micafungin (MYCAMINE) IV, 150 mg, Intravenous, Q24H  montelukast, 10 mg, Oral, Nightly  phosphorus, 250 mg, Oral, TID  potassium chloride, 20 mEq, Oral, BID With Meals  pregabalin, 100 mg, Oral, TID  senna-docusate sodium, 2 tablet, Oral, BID  sertraline, 50 mg, Oral, Nightly  sodium chloride, 3 mL, Intravenous,  "Q12H  triamterene-hydrochlorothiazide, 1 tablet, Oral, Daily  warfarin, 3 mg, Oral, Once       Continuous Infusions:  Pharmacy to Dose enoxaparin (LOVENOX),   Pharmacy to dose warfarin,   sodium chloride, 75 mL/hr, Last Rate: 75 mL/hr (12/10/21 0915)       PRN Meds:  •  acetaminophen **OR** acetaminophen **OR** acetaminophen  •  albuterol sulfate HFA  •  senna-docusate sodium **AND** polyethylene glycol **AND** bisacodyl **AND** bisacodyl  •  ipratropium-albuterol  •  melatonin  •  ondansetron **OR** ondansetron  •  oxyCODONE  •  Pharmacy to Dose enoxaparin (LOVENOX)  •  Pharmacy to dose warfarin  •  potassium chloride **OR** potassium chloride **OR** potassium chloride  •  sodium chloride  •  sodium chloride  •  temazepam     ALLERGIES:      Allergies   Allergen Reactions   • Morphine Nausea Only and Hives   • Penicillin V Potassium Rash   • Sulfa Antibiotics Rash   • Levaquin [Levofloxacin] Nausea Only and Dizziness     When taken with Coumadin   • Amoxicillin Rash       Objective    VITALS:   /81 (BP Location: Right arm, Patient Position: Lying)   Pulse 80   Temp 98.1 °F (36.7 °C) (Oral)   Resp 18   Ht 165.1 cm (65\")   Wt 69.9 kg (154 lb 1.6 oz)   LMP  (LMP Unknown)   SpO2 96%   BMI 25.64 kg/m²     PHYSICAL EXAM:     Physical Exam  Vitals and nursing note reviewed. Exam conducted with a chaperone present.   Constitutional:       General: She is not in acute distress.     Appearance: Normal appearance. She is obese. She is ill-appearing. She is not toxic-appearing or diaphoretic.   HENT:      Head: Normocephalic and atraumatic.      Right Ear: Tympanic membrane, ear canal and external ear normal.      Left Ear: Tympanic membrane, ear canal and external ear normal.      Nose: Nose normal. No congestion or rhinorrhea.      Mouth/Throat:      Mouth: Mucous membranes are moist.      Pharynx: Oropharynx is clear.   Eyes:      General:         Right eye: Discharge present.         Left eye: No discharge. "      Extraocular Movements: Extraocular movements intact.      Pupils: Pupils are equal, round, and reactive to light.      Comments: Improved- slight redness   Neck:      Vascular: No carotid bruit.   Cardiovascular:      Rate and Rhythm: Regular rhythm. Tachycardia present.      Pulses: Normal pulses.      Heart sounds: Normal heart sounds. No murmur heard.  No friction rub. No gallop.    Pulmonary:      Effort: Pulmonary effort is normal. No respiratory distress.      Breath sounds: No stridor. Wheezing and rhonchi present. No rales.   Chest:      Chest wall: No tenderness.   Abdominal:      General: Abdomen is flat. Bowel sounds are normal. There is no distension.      Palpations: Abdomen is soft. There is no mass.      Tenderness: There is no abdominal tenderness. There is no guarding or rebound.      Hernia: No hernia is present.   Genitourinary:     Comments: deferred  Musculoskeletal:         General: No swelling, tenderness or signs of injury. Normal range of motion.      Cervical back: Normal range of motion and neck supple. No rigidity or tenderness.   Lymphadenopathy:      Cervical: No cervical adenopathy.   Skin:     General: Skin is warm and dry.      Capillary Refill: Capillary refill takes less than 2 seconds.      Coloration: Skin is not jaundiced.      Findings: No erythema or rash.      Comments: S/p Left subclavian port removal- left chest incision   Neurological:      General: No focal deficit present.      Mental Status: She is alert and oriented to person, place, and time. Mental status is at baseline.      Cranial Nerves: No cranial nerve deficit.      Sensory: No sensory deficit.      Motor: No weakness.      Coordination: Coordination normal.      Gait: Gait normal.      Deep Tendon Reflexes: Reflexes normal.   Psychiatric:         Mood and Affect: Mood normal.         Behavior: Behavior normal.         Thought Content: Thought content normal.         Judgment: Judgment normal.         I  have reexamined the patient and the results are consistent with the previously documented exam. Cindy Ball MD     RECENT LABS:    Lab Results (last 24 hours)     Procedure Component Value Units Date/Time    Basic Metabolic Panel [[id number]]  (Abnormal) Collected: 12/11/21 0552    Specimen: Blood Updated: 12/11/21 0652     Glucose 104 mg/dL      BUN 21 mg/dL      Creatinine 0.71 mg/dL      Sodium 136 mmol/L      Potassium 4.4 mmol/L      Chloride 101 mmol/L      CO2 25.0 mmol/L      Calcium 6.7 mg/dL      eGFR Non African Amer 82 mL/min/1.73      BUN/Creatinine Ratio 29.6     Anion Gap 10.0 mmol/L     Narrative:      GFR Normal >60  Chronic Kidney Disease <60  Kidney Failure <15      Blood Culture - Blood, Chest, Left [[id number]]  (Abnormal)  (Susceptibility) Collected: 12/08/21 1910    Specimen: Blood from Chest, Left Updated: 12/11/21 0648     Blood Culture Candida albicans     Comment: Infectious disease consultation is highly recommended to rule out distant foci of infection.        Isolated from Aerobic and Anaerobic Bottles     Gram Stain Aerobic Bottle Yeast      Anaerobic Bottle Yeast    Susceptibility      Candida albicans     OSORIO     Fluconazole Susceptible     Micafungin Susceptible                  Linear View                   Blood Culture - Blood, Chest, Left [[id number]]  (Abnormal) Collected: 12/08/21 1946    Specimen: Blood from Chest, Left Updated: 12/11/21 0648     Blood Culture Candida albicans     Comment: Infectious disease consultation is highly recommended to rule out distant foci of infection.        Isolated from Aerobic Bottle     Gram Stain Aerobic Bottle Yeast    Narrative:      Refer to previous blood culture collected on 12/8/2021 1910 for MICs    Protime-INR [[id number]]  (Abnormal) Collected: 12/11/21 0552    Specimen: Blood Updated: 12/11/21 0645     Protime 13.5 Seconds      INR 1.24    CBC & Differential [[id number]]  (Abnormal) Collected: 12/11/21 0552    Specimen: Blood  Updated: 12/11/21 0620    Narrative:      The following orders were created for panel order CBC & Differential.  Procedure                               Abnormality         Status                     ---------                               -----------         ------                     CBC Auto Differential[[id number]]        Abnormal            Final result                 Please view results for these tests on the individual orders.    CBC Auto Differential [[id number]]  (Abnormal) Collected: 12/11/21 0552    Specimen: Blood Updated: 12/11/21 0620     WBC 7.60 10*3/mm3      RBC 3.27 10*6/mm3      Hemoglobin 8.6 g/dL      Hematocrit 26.6 %      MCV 81.3 fL      MCH 26.1 pg      MCHC 32.1 g/dL      RDW 19.0 %      RDW-SD 54.3 fl      MPV 8.4 fL      Platelets 183 10*3/mm3      Neutrophil % 82.1 %      Lymphocyte % 5.5 %      Monocyte % 10.9 %      Eosinophil % 1.3 %      Basophil % 0.2 %      Neutrophils, Absolute 6.20 10*3/mm3      Lymphocytes, Absolute 0.40 10*3/mm3      Monocytes, Absolute 0.80 10*3/mm3      Eosinophils, Absolute 0.10 10*3/mm3      Basophils, Absolute 0.00 10*3/mm3      nRBC 0.0 /100 WBC     Blood Culture - Blood, Arm, Left [[id number]] Collected: 12/10/21 1414    Specimen: Blood from Arm, Left Updated: 12/10/21 1504    Wound Culture - Wound, Chest, Left [[id number]] Collected: 12/10/21 1148    Specimen: Wound from Chest, Left Updated: 12/10/21 1247     Gram Stain Rare (1+) WBCs per low power field      No organisms seen    Fungus Culture - Hardware / Foreign Body, Chest, Left [[id number]] Collected: 12/10/21 1204    Specimen: Hardware / Foreign Body from Chest, Left Updated: 12/10/21 1204    Anaerobic Culture - Wound, Chest, Left [[id number]] Collected: 12/10/21 1148    Specimen: Wound from Chest, Left Updated: 12/10/21 1200          PENDING RESULTS:     IMAGING REVIEWED:  CT Chest Without Contrast Diagnostic    Result Date: 12/9/2021  1.Redemonstration of interstitial opacities in both lungs, again  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 2.Mucous plugging in the lower lobes. 3.Similar findings of metastatic disease in the chest, including partially calcified mediastinal lymphadenopathy, subcutaneous nodules, and extensive osseous metastatic disease.    Electronically Signed By-Dary Thapa MD On:12/9/2021 4:57 PM This report was finalized on [id number] by  Dary Thapa MD.      Assessment/Plan      ASSESSMENT:    Recurrent ovarian cancer with metastasis to brain, colon, abdominal wall and pulmonary involvement: She has had  Multiple lines of therapy in the past but most recently she is on Letrozol. Patient is currently on  Palliative XRT to the whole  brain         Chemotherapy history:    Doxorubicin Liposomal/Carboplatin: received 16 cycles: 2/15/2019 to 6/11/2020 : life time dose/progression of disease  Topotecan weekly: received 3 cycles : 6/2020 to 12/17/2020: progression of disease  Pemetrexed: Received 3 cycles: 12/23/20 to 3/10/2021: progression of disease  Cisplatin/Gemcitabine:  Received 2 cycles: 3/12/2021 to 5/7/2021- progression of disease  Denosumab (Xgeva) given every 28 days: Received 6 cycles out of 12 cycles- 2/1/21 to 7/16/21  Letrozole (Femara): 2.5mg by mouth daily: Started 9/22/2021: active treatment     2. 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 Will continue the same. ID is following.  3. Coumadin toxicity: INR > 8.  Holding coumadin was given Vitamin K in ED. Monitoring INR and coumadin  Per pharmacy: INR improved  4. Hypokalemia: replaced per primary team: on oral supplement  5. History of DVT: on anticoagulation with Coumadin- previously held due to coumadin toxicity- Patient normally takes Coumadin 5mg po daily  6. Pancytopenia: due to chemotherapy: on Procrit  7. Iron  deficiency anemia: Hemoglobin 8.3, HCT 27.4, MCV 81.1, MCH 26.4  8. Hypomagnesemia: replaced per primary team  9. Right eye bacterial conjunctivitis: will order Erythromycin eye gtts.   10. Candidemia, PCR detected Candida albicans due to port: s/p port removal. Patient getting IV micafungin, IV rocephin     PLANS    1. S/P left subclavian port removal per general surgery  2. IV ABT rocephin , micafungin  3. CBC/diff hemoglobin 8.3  4. Blood transfusion: Administer 1 unit of PRBC's for hemoglobin <7.5  5. Monitor for bleeding  6. Continue Femara   8.   Continue coumadin dosing per pharmacy with daily INR  9  Continue Erythromycin ophthalmic 0.5% gtts- give to right eye four times a day x7 days  10.  Continue supportive care  11. Discussed with patient.           Electronically signed by Cindy Ball MD, 12/11/21, 12:29 PM EST.

## 2021-12-11 NOTE — CONSULTS
Nutrition Services    Patient Name: Tahira Zimmerman  YOB: 1955  MRN: [id number]  Admission date: 12/8/2021    Continue mechanical soft solids and thin liquids per ST recommendations.     Continue Boost Breeze BID (provides 500 kcals, 18 g protein if consumed)     Start Boost Plus BID (Provides 720 kcals, 28 g protein if consumed)     This allows pt to try multiple types/flavors of Boost -- can adjust orders for preferred type once pt has had a chance to try all.       PPE Documentation        PPE Worn By Provider Mask, protective eyewear    PPE Worn By Patient  N/A     CLINICAL NUTRITION ASSESSMENT      Reason for Assessment 12/10: Consult for poor intake      H&P  Pt presented to hospital due to shortness of air x several days.    Past Medical History:   Diagnosis Date   • Anemia 2013   • Cervical disc disorder 2013    Herniated disc, pinched nerve   • Clotting disorder (HCC) 2013    Low platelets from chemo   • Colon polyp 2013   • Deep vein thrombosis (HCC) 2013   • Diverticulosis 2013   • GERD (gastroesophageal reflux disease) 2016   • HL (hearing loss) 2016    I need hearing aids   • Hyperlipidemia 2013    Need my cholesterol rechecked   • Hypertension    • Joint pain 2013    Shoulder pain, torn rotator cuff   • Low back pain 2013   • Neuromuscular disorder (HCC) 2015    Shingles, pinched nerves in my neck   • Osteopenia 2014   • Osteoporosis    • Ovarian cancer (HCC)     ovarian--  spread to lungs 10/2020   • Ovarian cancer on left (HCC) 1995   • Pneumonia 2010    Have had it several times   • Spinal stenosis 2013    Cervical   • Urinary tract infection 2013    Have had several utis       Past Surgical History:   Procedure Laterality Date   • BRONCHOSCOPY N/A 2/10/2021    Procedure: BRONCHOSCOPY with bronchioalveolar lavage;  Surgeon: Jerica Esparza MD;  Location: [facility] ENDOSCOPY;  Service: Pulmonary;  Laterality: N/A;  post op:right lobe pneumonia   • BRONCHOSCOPY N/A 6/15/2021    Procedure:  "BRONCHOSCOPY with bronchoalveolar lavage;  Surgeon: Jerica Esparza MD;  Location: [facility] ENDOSCOPY;  Service: Pulmonary;  Laterality: N/A;  lung cancer;pneumonia   • CATARACT EXTRACTION     • COLON SURGERY     • COLONOSCOPY  05/26/2018   • EXPLORATORY LAPAROTOMY     • HERNIA REPAIR     • HYSTERECTOMY     • OOPHORECTOMY          Current Problems   Pneumonia due to infectious organism, COVID-19 negative, chest x-ray per radiology shows bilateral pulmonary infiltrates,      Coumadin toxicity INR greater than 8      Hypokalemia     Ovarian  cancer with mets to bone brain and abdominal, followed by  of oncology     HTN, anxiety, depression     Dysphagia   - ST following     Encounter Information        Trending Narrative     12/10: Pt assessed due to concern for chronic poor intake. Noted pt with ongoing swallowing challenges due to severely dry mouth. ST is following and has recommended mechanical soft foods and thin liquids, which pt is tolerating so far. Per documentation, pt is open to ONS and MD has started Boost Breeze; RD will also add Boost Plus as another option for pt to try. Pt working with another provider at visit today and full NFPE not completed; will follow up. Weight has been stable the last 2.5-3 months, but prior to that, pt had about 14% weight loss; will monitor closely.      Anthropometrics        Current Height, Weight Height: 165.1 cm (65\")  Weight: 69.9 kg (154 lb 1.6 oz) (12/11/21 0415)       Ideal Body Weight (IBW) 125 lb   Usual Body Weight (UBW) Historically 175-180lb; more recently (last several months) ~155lb       Trending Weight Hx     This admission: 12/10: Scale weight 69.3kg so far this admission              PTA: 12/10: Pt with 14% weight loss in six months, stable weight the last 2.5-3 months; high risk for further loss     Wt Readings from Last 30 Encounters:   12/11/21 0415 69.9 kg (154 lb 1.6 oz)   12/09/21 0105 69.3 kg (152 lb 11.2 oz)   12/08/21 1745 71.7 kg (158 lb) "   11/10/21 1253 68.5 kg (151 lb)   11/03/21 1524 69.9 kg (154 lb)   10/26/21 1545 70.8 kg (156 lb)   10/07/21 1421 71.2 kg (157 lb)   10/07/21 1304 68.5 kg (151 lb)   10/05/21 1407 68.9 kg (152 lb)   09/20/21 1308 72.6 kg (160 lb)   09/16/21 1055 71.7 kg (158 lb)   08/28/21 0624 70.7 kg (155 lb 13.8 oz)   08/26/21 2207 71.5 kg (157 lb 10.1 oz)   08/26/21 0040 70.8 kg (156 lb 1.4 oz)   08/25/21 1302 77.1 kg (170 lb)   08/09/21 0915 74.8 kg (165 lb)   08/02/21 0951 75 kg (165 lb 6.4 oz)   07/26/21 1040 75.3 kg (166 lb)   07/26/21 1057 75.3 kg (166 lb)   07/16/21 1105 76.2 kg (168 lb)   07/09/21 1327 76.2 kg (168 lb)   07/09/21 1127 76 kg (167 lb 9.6 oz)   07/08/21 1434 78 kg (172 lb)   07/07/21 0841 76.7 kg (169 lb)   07/02/21 1412 77.1 kg (170 lb)   07/02/21 1126 76.2 kg (168 lb)   06/21/21 0827 76.7 kg (169 lb)   06/15/21 0418 80.2 kg (176 lb 14.4 oz)   06/14/21 1351 79.4 kg (175 lb)   06/14/21 0108 79.4 kg (175 lb)   06/13/21 1913 81.6 kg (180 lb)   06/02/21 0954 81.6 kg (180 lb)   05/26/21 1551 81.6 kg (180 lb)   05/07/21 0829 81.9 kg (180 lb 9.6 oz)   05/05/21 0907 80.7 kg (178 lb)   04/30/21 1116 79.4 kg (175 lb)   04/23/21 0756 80.3 kg (177 lb)   04/09/21 1351 80.7 kg (178 lb)      BMI kg/m2 Body mass index is 25.64 kg/m².       Labs        Pertinent Labs    Results from last 7 days   Lab Units 12/11/21  0552 12/10/21  0537 12/09/21  0529 12/08/21  1910 12/08/21  1910   SODIUM mmol/L 136 136 134*   < > 134*   POTASSIUM mmol/L 4.4 3.9 4.1   < > 3.3*   CHLORIDE mmol/L 101 101 99   < > 97*   CO2 mmol/L 25.0 25.0 26.0   < > 25.0   BUN mg/dL 21 24* 29*   < > 31*   CREATININE mg/dL 0.71 0.73 0.95   < > 1.01*   CALCIUM mg/dL 6.7* 6.7* 6.8*   < > 7.0*   BILIRUBIN mg/dL  --  0.2  --   --  0.3   ALK PHOS U/L  --  181*  --   --  209*   ALT (SGPT) U/L  --  13  --   --  10   AST (SGOT) U/L  --  26  --   --  22   GLUCOSE mg/dL 104* 86 120*   < > 155*    < > = values in this interval not displayed.     Results from last 7  days   Lab Units 12/11/21  0552 12/10/21  0537 12/09/21  0529   MAGNESIUM mg/dL  --   --  1.2*   HEMOGLOBIN g/dL 8.6*   < > 8.8*   HEMATOCRIT % 26.6*   < > 27.4*    < > = values in this interval not displayed.     COVID19   Date Value Ref Range Status   12/08/2021 Not Detected Not Detected - Ref. Range Final     No results found for: HGBA1C     Medications    Scheduled Medications calcium 500 mg vitamin D 5 mcg (200 UT), 1 tablet, Oral, BID  cefTRIAXone, 1 g, Intravenous, Q24H  cetirizine, 10 mg, Oral, Nightly  enoxaparin, 1 mg/kg, Subcutaneous, Q12H  erythromycin, , Right Eye, 4x Daily  famotidine, 40 mg, Oral, QAM AC  folic acid, 1,000 mcg, Oral, Daily  guaiFENesin, 1,200 mg, Oral, BID  letrozole, 2.5 mg, Oral, Daily  magnesium oxide, 400 mg, Oral, TID  micafungin (MYCAMINE) IV, 150 mg, Intravenous, Q24H  montelukast, 10 mg, Oral, Nightly  phosphorus, 250 mg, Oral, TID  potassium chloride, 20 mEq, Oral, BID With Meals  pregabalin, 100 mg, Oral, TID  senna-docusate sodium, 2 tablet, Oral, BID  sertraline, 50 mg, Oral, Nightly  sodium chloride, 3 mL, Intravenous, Q12H  triamterene-hydrochlorothiazide, 1 tablet, Oral, Daily        Infusions lactated ringers, 20 mL/hr, Last Rate: 20 mL/hr (12/10/21 1125)  Pharmacy to Dose enoxaparin (LOVENOX),   Pharmacy to dose warfarin,   sodium chloride, 75 mL/hr, Last Rate: 75 mL/hr (12/10/21 0915)        PRN Medications •  acetaminophen **OR** acetaminophen **OR** acetaminophen  •  albuterol sulfate HFA  •  senna-docusate sodium **AND** polyethylene glycol **AND** bisacodyl **AND** bisacodyl  •  ipratropium-albuterol  •  melatonin  •  ondansetron **OR** ondansetron  •  oxyCODONE  •  Pharmacy to Dose enoxaparin (LOVENOX)  •  Pharmacy to dose warfarin  •  potassium chloride **OR** potassium chloride **OR** potassium chloride  •  sodium chloride  •  sodium chloride  •  temazepam     Physical Findings        Trending Physical   Appearance, NFPE 12/10: Pt appears thin, but not certain  "about muscle/fat wasting presently; not able to do full NFPE today as pt working with another provider at visit; will follow up for hands-on exam    --  Edema  None documented      Bowel Function BM 12/10     Tubes None documented      Chewing/Swallowing Significant swallowing difficulty -- ST is following and recommends mechanical soft diet and thin liquids      Skin No pressure injuries       Estimated/Assessed Needs       Energy Requirements    Height for Calculation  Height: 165.1 cm (65\")   Weight for Calculation 69.3kg   Method for Estimation  30 kcal/kg - metastatic CA   EST Needs (kcal/day) 2079 kcal/day       Protein Requirements    Weight for Calculation 69.3 kg   EST Protein Needs (g/kg) 1.3 g/kg   EST Daily Needs (g/day) 90 g/day       Fluid Requirements     Estimated Needs (mL/day) 1mL/kcal - monitor hydration status       Fluid Deficit    Current Na Level (mEq/L)    Desired Na Level (mEq/L)    Estimated Fluid Deficit (L)       Current Nutrition Orders & Evaluation of Intake       Oral Nutrition     Food Allergies NKFA   Current PO Diet Diet Regular   Supplement Boost Breeze TID ordered by MD   PO Evaluation     Trending % PO Intake Variable intake so far; will continue to monitor; not consistently above 75% at meals     Enteral Nutrition    Enteral Route    TF Modular    TF Delivery Method    Current TF Order    Current Water Flush     TF Residual/Tolerance     TF Observation         Parenteral Nutrition     TPN Route    Current TPN Order    Lipids (mL/%/frequency)     MVI Frequency     Trace Element Frequency     Total # Days on TPN    TPN Observation         Nutrition Course Details    PO Diets: 12/8: NPO  12/9: Mechanical ground with extra sauce/gravy  12/10: NPO  12/10: Mechanical ground    Nutrition Support:      Nutritional Risk Screening        NRS-2002 Score          Nutrition Diagnosis         Nutrition Dx Problem 1 Swallowing difficulty R/t very dry mouth and associated dysphagia; as " evidenced by results of ST assessments with recommendations for mechanical soft diet.       Nutrition Dx Problem 2        Intervention Goal         Intervention Goal(s) Intakes 75% or better at meals, with tolerance   Pt tolerates least restrictive diet texture to allow for safe intake      Nutrition Intervention        RD Action Will work up TF in case needed   Continue diet per ST (mechanical soft, thin liquids)   Add Boost Plus as another option for pt to try      Nutrition Prescription          Diet Prescription Mechanical soft, thin liquids    Supplement Prescription Boost Breeze BID  Boost Plus BID     Enteral Prescription IF dysphagia worsens and TF is required, the following would meet needs:     Initial Goal:  *initial goal conservative to help establish tolerance     Nutren 1.5 at 40mL/hr + 10mL/hr water flush      End Goal:    Nutren 1.5 at 65 mL/hr; will adjust free water per clinical picture      Calories  2145 kcals (103%)    Protein  97 g (108%)    Free water  1087 mL    Flushes  Will adjust per clinical picture     The above end goal rate is for 22 hrs/day to assume interruptions for ADLs. Water flushes adjusted based on clinical picture + Rx flushes/IV fluids        TPN Prescription      Monitor/Evaluation        Monitor I&O, PO intake, Pertinent labs, Weight, Skin status, GI status, POC/GOC, Swallow function     Electronically signed by:  Stacia Monte RD

## 2021-12-11 NOTE — PLAN OF CARE
Goal Outcome Evaluation:  Plan of Care Reviewed With: patient        Progress: no change  Outcome Summary: patient sat in the chair most of the day, magnesium is 1.0 she she will be getting replacement and is still on iv abx.  Patient is on 5L nc, will continue to monitor.

## 2021-12-11 NOTE — PROGRESS NOTES
Infectious Diseases Progress Note      LOS: 3 days   Patient Care Team:  Noemy Queen MD as PCP - General (Family Medicine)  Cindy Ball MD as Consulting Physician (Hematology and Oncology)    Chief Complaint: Shortness of breath    Subjective       The patient has been afebrile for the last 24 hours.  The patient is currently on 5 L oxygen via nasal cannula.  The patient denied having any new complaints today.      Review of Systems:   Review of Systems   Constitutional: Positive for fatigue.   HENT: Negative.    Eyes: Negative.    Respiratory: Positive for shortness of breath.    Cardiovascular: Negative.    Gastrointestinal: Negative.    Endocrine: Negative.    Genitourinary: Negative.    Musculoskeletal: Negative.    Skin: Negative.    Neurological: Negative.    Psychiatric/Behavioral: Negative.    All other systems reviewed and are negative.       Objective     Vital Signs  Temp:  [97.8 °F (36.6 °C)-98.1 °F (36.7 °C)] 98.1 °F (36.7 °C)  Heart Rate:  [78-92] 80  Resp:  [18-20] 18  BP: (117-134)/(75-81) 134/81    Physical Exam:  Physical Exam  Vitals and nursing note reviewed.   Constitutional:       General: She is not in acute distress.     Appearance: Normal appearance. She is well-developed and normal weight. She is not diaphoretic.   HENT:      Head: Normocephalic and atraumatic.   Eyes:      General: No scleral icterus.     Extraocular Movements: Extraocular movements intact.      Conjunctiva/sclera: Conjunctivae normal.      Pupils: Pupils are equal, round, and reactive to light.   Cardiovascular:      Rate and Rhythm: Normal rate and regular rhythm.      Heart sounds: Normal heart sounds, S1 normal and S2 normal. No murmur heard.      Pulmonary:      Effort: Pulmonary effort is normal. No respiratory distress.      Breath sounds: No stridor. Rales present. No wheezing.   Chest:      Chest wall: No tenderness.   Abdominal:      General: Bowel sounds are normal. There is no distension.       Palpations: Abdomen is soft. There is no mass.      Tenderness: There is no abdominal tenderness. There is no guarding.   Musculoskeletal:         General: No swelling, tenderness or deformity. Normal range of motion.      Cervical back: Neck supple.   Skin:     General: Skin is warm and dry.      Coloration: Skin is not pale.      Findings: No bruising, erythema or rash.      Comments: Multiple skin nodules noticed on the head and 1 large nodule in the left anterior chest     Port has been removed   Neurological:      General: No focal deficit present.      Mental Status: She is alert and oriented to person, place, and time. Mental status is at baseline.      Cranial Nerves: No cranial nerve deficit.   Psychiatric:         Mood and Affect: Mood normal.          Results Review:    I have reviewed all clinical data, test, lab, and imaging results.     Radiology  XR Chest 1 View    Result Date: 12/11/2021  EXAMINATION: XR CHEST 1 VW-  DATE OF EXAM: 12/11/2021 5:08 PM  INDICATION: SOB; J18.9-Pneumonia, unspecified organism; R06.02-Shortness of breath; R93.0-Abnormal findings on diagnostic imaging of skull and head, not elsewhere classified; R79.1-Abnormal coagulation profile; Z95.828-Presence of other vascular implants and grafts.  COMPARISON: Chest radiograph dated 12/08/2021  TECHNIQUE: Portable AP view of the chest was obtained.  FINDINGS: The cardiomediastinal silhouette is within normal limits. Pulmonary vascularity is unremarkable. There are patchy areas of airspace opacity throughout both lungs likely representing multifocal pneumonia. There is no pleural effusion or pneumothorax. There are degenerative changes of the thoracic spine. There is no free air under the diaphragm.      1. Patchy bilateral airspace opacities likely representing multifocal pneumonia. No significant change from prior exam dated 12/08/2021. 2. Removal of the left-sided chest port.  Electronically Signed By-Cristian Pennington MD  On:12/11/2021 5:49 PM This report was finalized on [id number] by  Cristian Pennington MD.      Cardiology    Laboratory    Results from last 7 days   Lab Units 12/11/21  0552 12/10/21  0537 12/09/21 0529 12/08/21  1910   WBC 10*3/mm3 7.60 7.40 5.90 4.20   HEMOGLOBIN g/dL 8.6* 8.3* 8.8* 9.6*   HEMATOCRIT % 26.6* 25.6* 27.4* 29.6*   PLATELETS 10*3/mm3 183 203 232 222     Results from last 7 days   Lab Units 12/11/21  0552 12/10/21  0537 12/09/21 0529 12/08/21 1910   SODIUM mmol/L 136 136 134* 134*   POTASSIUM mmol/L 4.4 3.9 4.1 3.3*   CHLORIDE mmol/L 101 101 99 97*   CO2 mmol/L 25.0 25.0 26.0 25.0   BUN mg/dL 21 24* 29* 31*   CREATININE mg/dL 0.71 0.73 0.95 1.01*   GLUCOSE mg/dL 104* 86 120* 155*   ALBUMIN g/dL  --  2.40*  --  2.60*   BILIRUBIN mg/dL  --  0.2  --  0.3   ALK PHOS U/L  --  181*  --  209*   AST (SGOT) U/L  --  26  --  22   ALT (SGPT) U/L  --  13  --  10   CALCIUM mg/dL 6.7* 6.7* 6.8* 7.0*                 Microbiology   Microbiology Results (last 10 days)     Procedure Component Value - Date/Time    Blood Culture - Blood, Arm, Left [[id number]]  (Normal) Collected: 12/10/21 1414    Lab Status: Preliminary result Specimen: Blood from Arm, Left Updated: 12/11/21 1515     Blood Culture No growth at 24 hours    Wound Culture - Wound, Chest, Left [[id number]] Collected: 12/10/21 1148    Lab Status: Preliminary result Specimen: Wound from Chest, Left Updated: 12/11/21 1127     Wound Culture No growth     Gram Stain Rare (1+) WBCs per low power field      No organisms seen    Blood Culture - Blood, Chest, Left [[id number]]  (Abnormal) Collected: 12/08/21 1946    Lab Status: Final result Specimen: Blood from Chest, Left Updated: 12/11/21 0648     Blood Culture Candida albicans     Comment: Infectious disease consultation is highly recommended to rule out distant foci of infection.        Isolated from Aerobic Bottle     Gram Stain Aerobic Bottle Yeast    Narrative:      Refer to previous blood culture collected  on 12/8/2021 1910 for MICs    Blood Culture - Blood, Chest, Left [[id number]]  (Abnormal)  (Susceptibility) Collected: 12/08/21 1910    Lab Status: Final result Specimen: Blood from Chest, Left Updated: 12/11/21 0648     Blood Culture Candida albicans     Comment: Infectious disease consultation is highly recommended to rule out distant foci of infection.        Isolated from Aerobic and Anaerobic Bottles     Gram Stain Aerobic Bottle Yeast      Anaerobic Bottle Yeast    Susceptibility      Candida albicans     OSORIO     Fluconazole Susceptible     Micafungin Susceptible                         Respiratory Panel PCR w/COVID-19(SARS-CoV-2) JAMAL/ROLF/ABENA/PAD/COR/MAD/MICHAEL In-House, NP Swab in UTM/VTM, 3-4 HR TAT - Swab, Nasopharynx [[id number]]  (Normal) Collected: 12/08/21 1910    Lab Status: Final result Specimen: Swab from Nasopharynx Updated: 12/08/21 2029     ADENOVIRUS, PCR Not Detected     Coronavirus 229E Not Detected     Coronavirus HKU1 Not Detected     Coronavirus NL63 Not Detected     Coronavirus OC43 Not Detected     COVID19 Not Detected     Human Metapneumovirus Not Detected     Human Rhinovirus/Enterovirus Not Detected     Influenza A PCR Not Detected     Influenza B PCR Not Detected     Parainfluenza Virus 1 Not Detected     Parainfluenza Virus 2 Not Detected     Parainfluenza Virus 3 Not Detected     Parainfluenza Virus 4 Not Detected     RSV, PCR Not Detected     Bordetella pertussis pcr Not Detected     Bordetella parapertussis PCR Not Detected     Chlamydophila pneumoniae PCR Not Detected     Mycoplasma pneumo by PCR Not Detected    Narrative:      In the setting of a positive respiratory panel with a viral infection PLUS a negative procalcitonin without other underlying concern for bacterial infection, consider observing off antibiotics or discontinuation of antibiotics and continue supportive care. If the respiratory panel is positive for atypical bacterial infection (Bordetella pertussis, Chlamydophila  pneumoniae, or Mycoplasma pneumoniae), consider antibiotic de-escalation to target atypical bacterial infection.    Blood Culture ID, PCR - Blood, Chest, Left [[id number]]  (Abnormal) Collected: 12/08/21 1910    Lab Status: Final result Specimen: Blood from Chest, Left Updated: 12/09/21 1422     BCID, PCR Candida albicans. Identification by BCID2 PCR     BOTTLE TYPE Aerobic Bottle    Narrative:      Infectious disease consultation is highly recommended to rule out distant foci of infection.          Medication Review:       Schedule Meds  budesonide, 0.5 mg, Nebulization, BID - RT  calcium 500 mg vitamin D 5 mcg (200 UT), 1 tablet, Oral, BID  cefTRIAXone, 1 g, Intravenous, Q24H  cetirizine, 10 mg, Oral, Nightly  enoxaparin, 1 mg/kg, Subcutaneous, Q12H  erythromycin, , Right Eye, 4x Daily  famotidine, 40 mg, Oral, QAM AC  folic acid, 1,000 mcg, Oral, Daily  guaiFENesin, 1,200 mg, Oral, BID  ipratropium-albuterol, 3 mL, Nebulization, 4x Daily - RT  letrozole, 2.5 mg, Oral, Daily  magnesium oxide, 400 mg, Oral, TID  micafungin (MYCAMINE) IV, 150 mg, Intravenous, Q24H  montelukast, 10 mg, Oral, Nightly  phosphorus, 250 mg, Oral, TID  potassium chloride, 20 mEq, Oral, BID With Meals  pregabalin, 100 mg, Oral, TID  senna-docusate sodium, 2 tablet, Oral, BID  sertraline, 50 mg, Oral, Nightly  sodium chloride, 3 mL, Intravenous, Q12H  triamterene-hydrochlorothiazide, 1 tablet, Oral, Daily        Infusion Meds  Pharmacy to Dose enoxaparin (LOVENOX),   Pharmacy to dose warfarin,   sodium chloride, 75 mL/hr, Last Rate: 75 mL/hr (12/10/21 0915)        PRN Meds  •  acetaminophen **OR** acetaminophen **OR** acetaminophen  •  albuterol sulfate HFA  •  senna-docusate sodium **AND** polyethylene glycol **AND** bisacodyl **AND** bisacodyl  •  ipratropium-albuterol  •  magnesium sulfate **OR** magnesium sulfate in D5W 1g/100mL (PREMIX) **OR** magnesium sulfate  •  melatonin  •  ondansetron **OR** ondansetron  •  oxyCODONE  •  Pharmacy  to Dose enoxaparin (LOVENOX)  •  Pharmacy to dose warfarin  •  potassium chloride **OR** potassium chloride **OR** potassium chloride  •  sodium chloride  •  sodium chloride  •  temazepam        Assessment/Plan       Antimicrobial Therapy   1.  Micafungin        2.  Rocephin        3.        4.        5.            Assessment     Candidemia with Candida albicans.  Most likely secondary to port infection  -Port was removed on 12/10/2021-tip culture is pending     Dyspnea and hypoxia.  Chest x-ray showed interstitial lung infiltrates.  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  Patient also have multiple skin nodules concerning for metastasis.  Patient was on oral chemotherapy prior to admission     The patient is s/p port placement in the past     Plan     Continue IV micafungin 150 mg daily patient will need 2 weeks of treatment  Continue IV Rocephin for now  Pulmonary service was consulted to evaluate patient for possible bronchoscopy  Continue supportive care  A.m. labs  Long-term prognosis is poor secondary to advanced and metastatic ovarian cancer       Cony Shelby MD  12/11/21  18:30 EST    Note is dictated utilizing voice recognition software/Dragon

## 2021-12-11 NOTE — PLAN OF CARE
Problem: Adult Inpatient Plan of Care  Goal: Plan of Care Review  Outcome: Ongoing, Progressing  Flowsheets (Taken 12/11/2021 0521)  Plan of Care Reviewed With: patient  Outcome Summary: patient stable on 5 liters oxygen, will continue to monitor  Goal: Patient-Specific Goal (Individualized)  Outcome: Ongoing, Progressing  Goal: Absence of Hospital-Acquired Illness or Injury  Outcome: Ongoing, Progressing  Intervention: Identify and Manage Fall Risk  Description: Perform standard risk assessment with a validated tool or comprehensive approach appropriate to the patient on admission; reassess fall risk frequently, with change in status or transfer to another level of care.  Communicate fall injury risk to interprofessional healthcare team.  Determine need for increased observation, equipment and environmental modification, such as low bed and signage, as well as supportive, nonskid footwear.  Adjust safety measures to individual developmental age, stage and identified risk factors.  Reinforce the importance of safety and physical activity with patient and family.  Perform regular intentional rounding to assess need for position change, pain assessment, personal needs, including assistance with toileting.  Recent Flowsheet Documentation  Taken 12/11/2021 0355 by Joanie Dempsey, RN  Safety Promotion/Fall Prevention:   activity supervised   assistive device/personal items within reach   clutter free environment maintained   fall prevention program maintained   gait belt   nonskid shoes/slippers when out of bed   room organization consistent   safety round/check completed  Taken 12/11/2021 0151 by Joanie Dempsey, RN  Safety Promotion/Fall Prevention:   activity supervised   assistive device/personal items within reach   clutter free environment maintained   fall prevention program maintained   gait belt   nonskid shoes/slippers when out of bed   room organization consistent   safety round/check completed  Intervention:  Prevent Skin Injury  Description: Assess skin risk on admission and at regular intervals throughout hospital stay.  Keep all areas of skin (especially folds) clean and dry.  Maintain adequate skin hydration.  Relieve and redistribute pressure and protect bony prominences; implement measures based on patient-specific risk factors.  Match turning and repositioning schedule to clinical condition.  Encourage weight shift frequently; assist with reposition if unable to complete independently.  Float heels off bed. Avoid pressure on the Achilles tendon.  Keep skin free from extended contact with medical devices.  Use aids (e.g., slide boards, mechanical lift) during transfer.  Recent Flowsheet Documentation  Taken 12/11/2021 0355 by Joanie Dempsey, RN  Body Position: position changed independently  Taken 12/11/2021 0151 by Joanie Dempsey RN  Body Position: position changed independently  Goal: Optimal Comfort and Wellbeing  Outcome: Ongoing, Progressing  Goal: Readiness for Transition of Care  Outcome: Ongoing, Progressing   Goal Outcome Evaluation:  Plan of Care Reviewed With: patient           Outcome Summary: patient stable on 5 liters oxygen, will continue to monitor

## 2021-12-11 NOTE — PROGRESS NOTES
"Pharmacy dosing service  Anticoagulant  Warfarin     Subjective:    Tahira Zimmerman is a 66 y.o.female being continued on warfarin for atrial fibrillation.    INR Goal: 2 - 3  Home medication?: Yes, warfarin 4 mg PO every day at 1800 for the last week.  Prior patient was on warfarin 5mg daily.  Bridge Therapy Present?:  Yes,  Enoxaparin 70 mg SQ Q12H  Interacting Medications Evaluation (New/Present/Discontinued): luis fernando  Additional Contributing Factors: Bacteremia       Assessment/Plan:    Patient admitted with supratherapeutic INR.  Vitamin K 10mg sq given x1 on 12/8.  Home dose for the last week was warfarin 4mg daily, per patient report.  INR continues to drop to 1.24.  Treatment lovenox started 12/10.  3 mg warfarin dose on 12/10 was not ordered.  Will give 3 mg dose tonight  OK to dc lovenox once INR is therapeutic.    Continue to monitor and adjust based on INR.         Date 12/9 12/10 12/11         INR 2.23 1.27 1.24         Dose 2mg Not ordered 3mg             Objective:  [Ht: 165.1 cm (65\"); Wt: 69.9 kg (154 lb 1.6 oz); BMI: Body mass index is 25.64 kg/m².]    Lab Results   Component Value Date    ALBUMIN 2.40 (L) 12/10/2021     Lab Results   Component Value Date    INR 1.24 (L) 12/11/2021    INR 1.27 (L) 12/10/2021    INR 1.42 (L) 12/09/2021    PROTIME 13.5 (L) 12/11/2021    PROTIME 13.9 (L) 12/10/2021    PROTIME 15.4 (L) 12/09/2021     Lab Results   Component Value Date    HGB 8.6 (L) 12/11/2021    HGB 8.3 (L) 12/10/2021    HGB 8.8 (L) 12/09/2021     Lab Results   Component Value Date    HCT 26.6 (L) 12/11/2021    HCT 25.6 (L) 12/10/2021    HCT 27.4 (L) 12/09/2021       Tristen Morales, Pharmacy Intern  12/11/21 08:41 EST         "

## 2021-12-11 NOTE — PLAN OF CARE
Problem: Adult Inpatient Plan of Care  Goal: Plan of Care Review  Outcome: Ongoing, Progressing  Flowsheets  Taken 12/11/2021 1139  Progress: improving  Plan of Care Reviewed With: patient  Outcome Summary: Pt is 65 yo female admitted for SOA, PNA, coumadin toxicity, candidemia, ?infected port.  Pt s/p removal of port on 12/10/2021.  Pt has ovarian cancer with mets to brain, bone, abdominal organs.  Pt presnets with global weakness, poor endurance.  Pt requiring Luca-modA for transfers.  Pt initially ambulated to bathroom with HHA-modA and poor balance observed.  Pt then ambulated using RW with improved balance, however pt continuing to require Luca due to weakness.  Pt with SOA and fatigue noted after mobilizing to/from bathroom with O2 measuring 90% after seated rest break.  Unable to ambulate longer distance due to weakness.  Pt far from mobility baseline of ambulating indep and negotiating 14 steps to bedroom.  Pt will need extensive IP rehab to address deficits.  PT will follow 3x/week while at [facility].

## 2021-12-11 NOTE — PROGRESS NOTES
[facility] Medicine Services Daily Progress Note    Patient Name: Tahira Zimmerman  : 1955  MRN: [id number]  Primary Care Physician:  Noemy Queen MD  Date of admission: 2021      Subjective      Chief Complaint: Cough with shortness of breath and pneumonia.    12/10/2021  Overall the patient is feeling better.  No new issues.  Did have an uneventful trip to the operating room for removal of her port.    2021  Today the patient reports feeling somewhat better.  The patient tolerated the removal of the port well.  The patient is currently on a Lovenox bridge while she gets reanticoagulated.    Review of Systems   Constitutional: Positive for decreased appetite, malaise/fatigue and weight loss.   HENT: Negative.    Eyes: Negative.    Cardiovascular: Negative.    Respiratory: Positive for cough and sputum production.         She reports her breathing is somewhat improved.   Endocrine: Negative.    Hematologic/Lymphatic: Negative.    Skin: Negative.    Musculoskeletal: Positive for muscle weakness.   Gastrointestinal: Positive for dysphagia.   Genitourinary: Negative.    Neurological: Negative.    Psychiatric/Behavioral: Negative.    Allergic/Immunologic: Negative.    All other systems reviewed and are negative.         Objective      Vitals:   Temp:  [97.8 °F (36.6 °C)-98.1 °F (36.7 °C)] 98.1 °F (36.7 °C)  Heart Rate:  [78-92] 80  Resp:  [18-20] 18  BP: (117-134)/(75-81) 134/81  Flow (L/min):  [5] 5    Physical Exam  Vitals and nursing note reviewed.   Constitutional:       General: She is not in acute distress.     Appearance: Normal appearance. She is well-developed. She is not ill-appearing, toxic-appearing or diaphoretic.      Comments: The patient is very ill in her appearance. She continues to have some issues with eating but overall she seems to be improving.   HENT:      Head: Normocephalic and atraumatic.      Right Ear: Ear canal and external ear normal.       Left Ear: Ear canal and external ear normal.      Nose: Nose normal. No congestion or rhinorrhea.      Mouth/Throat:      Mouth: Mucous membranes are moist.      Pharynx: No oropharyngeal exudate.   Eyes:      General: No scleral icterus.        Right eye: No discharge.         Left eye: No discharge.      Extraocular Movements: Extraocular movements intact.      Conjunctiva/sclera: Conjunctivae normal.      Pupils: Pupils are equal, round, and reactive to light.   Neck:      Thyroid: No thyromegaly.      Vascular: No carotid bruit or JVD.      Trachea: No tracheal deviation.   Cardiovascular:      Rate and Rhythm: Normal rate and regular rhythm.      Pulses: Normal pulses.      Heart sounds: Normal heart sounds. No murmur heard.  No friction rub. No gallop.    Pulmonary:      Effort: Pulmonary effort is normal. No respiratory distress.      Breath sounds: No stridor. No wheezing, rhonchi or rales.      Comments: Few scattered rhonchi which improve with coughing.  Chest:      Chest wall: No tenderness.   Abdominal:      General: Bowel sounds are normal. There is no distension.      Palpations: Abdomen is soft. There is no mass.      Tenderness: There is no abdominal tenderness. There is no guarding or rebound.      Hernia: No hernia is present.   Musculoskeletal:         General: No swelling, tenderness, deformity or signs of injury. Normal range of motion.      Cervical back: Normal range of motion and neck supple. No rigidity. No muscular tenderness.      Right lower leg: No edema.      Left lower leg: No edema.   Lymphadenopathy:      Cervical: No cervical adenopathy.   Skin:     General: Skin is warm and dry.      Coloration: Skin is not jaundiced or pale.      Findings: No bruising, erythema or rash.   Neurological:      General: No focal deficit present.      Mental Status: She is alert and oriented to person, place, and time. Mental status is at baseline.      Cranial Nerves: No cranial nerve deficit.       Sensory: No sensory deficit.      Motor: No weakness or abnormal muscle tone.      Coordination: Coordination normal.   Psychiatric:         Mood and Affect: Mood normal.         Behavior: Behavior normal.         Thought Content: Thought content normal.         Judgment: Judgment normal.             Result Review    Result Review:  I have personally reviewed the results from the time of this admission to 12/11/2021 16:45 EST and agree with these findings:  [x]  Laboratory  [x]  Microbiology  [x]  Radiology  []  EKG/Telemetry   []  Cardiology/Vascular   []  Pathology  []  Old records  [x]  Other:  Most notable findings include: Speech therapy report    Wounds (last 24 hours)     LDA Wound     Row Name 12/11/21 0730 12/11/21 0355 12/11/21 0151       Wound 12/10/21 1137 Left upper chest Incision    Wound - Properties Group Placement Date: 12/10/21  -TT Placement Time: 1137  -TT Side: Left  -TT Orientation: upper  -TT Location: chest  -TT Primary Wound Type: Incision  -TT    Closure Liquid skin adhesive; Approximated  -RP Liquid skin adhesive; Approximated  -LS Liquid skin adhesive; Approximated  -LS    Drainage Amount none  -RP none  -LS none  -LS    Retired Wound - Properties Group Date first assessed: 12/10/21  -TT Time first assessed: 1137  -TT Side: Left  -TT Location: chest  -TT Primary Wound Type: Incision  -TT    Row Name 12/10/21 1901             Wound 12/10/21 1137 Left upper chest Incision    Wound - Properties Group Placement Date: 12/10/21  -TT Placement Time: 1137  -TT Side: Left  -TT Orientation: upper  -TT Location: chest  -TT Primary Wound Type: Incision  -TT      Dressing Appearance dry; intact; no drainage  -BD      Closure Liquid skin adhesive; Approximated  -BD      Drainage Amount none  -BD      Retired Wound - Properties Group Date first assessed: 12/10/21  -TT Time first assessed: 1137  -TT Side: Left  -TT Location: chest  -TT Primary Wound Type: Incision  -TT            User Key  (r) = Recorded  By, (t) = Taken By, (c) = Cosigned By    Initials Name Provider Type    Johnna Navarrete RN Registered Nurse    Joanie Bailey RN Registered Nurse    Chelsea Hernandez RN Registered Nurse    Regla Kasper RN Registered Nurse                  Assessment/Plan      Brief Patient Summary:  Tahira Zimmerman is a 66 y.o. female who has numerous medical problems which include metastatic ovarian cancer which is widely metastatic, hypertension, coagulopathy related to Coumadin dosing with INR of eight, history of DVTs. The patient presented for increasing issues with swallowing.      budesonide, 0.5 mg, Nebulization, BID - RT  calcium 500 mg vitamin D 5 mcg (200 UT), 1 tablet, Oral, BID  cefTRIAXone, 1 g, Intravenous, Q24H  cetirizine, 10 mg, Oral, Nightly  enoxaparin, 1 mg/kg, Subcutaneous, Q12H  erythromycin, , Right Eye, 4x Daily  famotidine, 40 mg, Oral, QAM AC  folic acid, 1,000 mcg, Oral, Daily  guaiFENesin, 1,200 mg, Oral, BID  ipratropium-albuterol, 3 mL, Nebulization, 4x Daily - RT  letrozole, 2.5 mg, Oral, Daily  magnesium oxide, 400 mg, Oral, TID  micafungin (MYCAMINE) IV, 150 mg, Intravenous, Q24H  montelukast, 10 mg, Oral, Nightly  phosphorus, 250 mg, Oral, TID  potassium chloride, 20 mEq, Oral, BID With Meals  pregabalin, 100 mg, Oral, TID  senna-docusate sodium, 2 tablet, Oral, BID  sertraline, 50 mg, Oral, Nightly  sodium chloride, 3 mL, Intravenous, Q12H  triamterene-hydrochlorothiazide, 1 tablet, Oral, Daily  warfarin, 3 mg, Oral, Once       Pharmacy to Dose enoxaparin (LOVENOX),   Pharmacy to dose warfarin,   sodium chloride, 75 mL/hr, Last Rate: 75 mL/hr (12/10/21 0915)         Active Hospital Problems:  Active Hospital Problems    Diagnosis    • **Port-A-Cath in place    • Pneumonia due to infectious organism    • Coumadin toxicity    • Hypokalemia    • AMS (altered mental status)    • Bone metastases (HCC)    • Depression    • Brain metastases (HCC)    • Malignant neoplasm of right ovary  ([facility])      Plan:   Pneumonia due to infectious organism, COVID-19 negative, chest x-ray per radiology shows bilateral pulmonary infiltrates, WBC not elevated, continue IV ceftriaxone and IV azithromycin until blood cultures resulted, DuoNebs every 4 hours as needed and albuterol every 6 hours as needed, stable on home 6 L oxygen via nasal cannula. Will follow closely to see if she improves on the ceftriaxone. Discontinue the a azithromycin as her viral panel was negative. She is allergic to penicillins therefore will not change her to something like Zosyn which might help if this were aspiration. We will follow her closely and if need be start Cleocin.  Pulmonary medicine is considering bronchoscopy if she does not start to show improvement.     Coumadin toxicity INR greater than 8 with past medical history of DVT, hold home Coumadin for now given vitamin K in ED, INR daily pharmacy to dose warfarin to INR. Her INR is down to 1.24  this morning. We will continue to follow and restart with pharmacy's help.  She is currently on a Lovenox bridge.     Altered mental status?  Patient appears awake and alert and answers appropriately, she denies confusion, CT head negative for acute changes, mets to brain known     Hypokalemia mild potassium 3.3, potassium protocol in place repeat BNP in a.m. magnesium in a.m.     Ovarian  cancer with mets to bone brain and abdominal, followed by  of oncology, consulted Home meds unverified at this time reorder pending verification pharmacy       -I did speak with the patient about whether or not she was interested in having a lung biopsy to see if the lesions and disease in her lung is related to her cancer and that has metastasized.  She actually at this point is really more concerned about quality of life than quantity and understands that she has serious disease and told me that she was really not interested in pursuing aggressive/invasive procedures to further delineate the  extent of her disease.    Hypertension, controlled on home meds unverified at this time reorder pending verification from pharmacy monitor BP     Anxiety depression, home meds unverified at this time reorder pending verification from pharmacy     Poor appetite, weight loss likely secondary to malignancy, boost 3 times daily ordered, dietary consulted     The patient continues on IV micafungin and will need this for total of 14 days intravenously.  Likely need case management to assist in setting the patient up with a way to receive this that meets her needs.    DVT prophylaxis:  Medical DVT prophylaxis orders are present.    CODE STATUS:    Level Of Support Discussed With: Patient  Code Status (Patient has no pulse and is not breathing): No CPR (Do Not Attempt to Resuscitate)  Medical Interventions (Patient has pulse or is breathing): Full Support      Disposition:  I expect patient to be discharged in 2 to 3 days if she improves.    This patient has been examined wearing appropriate Personal Protective Equipment and discussed with hospital infection control department. 12/11/21      Electronically signed by Jaky Pettit MD, 12/11/21, 16:45 EST.  [facility] Hospitalist Team

## 2021-12-11 NOTE — PROGRESS NOTES
"Pharmacy dosing service  Anticoagulant  Warfarin     Subjective:    Tahira Zimmerman is a 66 y.o.female being continued on warfarin for atrial fibrillation.    INR Goal: 2 - 3  Home medication?: Yes, warfarin 4 mg PO every day at 1800 for the last week.  Prior patient was on warfarin 5mg daily.  Bridge Therapy Present?:  Yes,  Enoxaparin 70 mg SQ Q12H  Interacting Medications Evaluation (New/Present/Discontinued): luis fernando  Additional Contributing Factors: Bacteremia       Assessment/Plan:    Patient admitted with supratherapeutic INR.  Vitamin K 10mg sq x1 on 12/8. Warfarin restarted 12/9 after INR was back to therapeutic level. Treatment lovenox started 12/10.  Patient was to receive 3mg dose of warfarin on 12/10 however order was not placed therefore patient did not receive dose yesterday.  Will give 3 mg dose tonight  It will take some time to overcome the effects of the vitamin K before we start to see a rise in INR.     Continue to monitor and adjust based on INR.         Date 12/9 12/10 12/11         INR 2.23 1.27 1.24         Dose 2mg 0 mg 3mg             Objective:  [Ht: 165.1 cm (65\"); Wt: 69.9 kg (154 lb 1.6 oz); BMI: Body mass index is 25.64 kg/m².]    Lab Results   Component Value Date    ALBUMIN 2.40 (L) 12/10/2021     Lab Results   Component Value Date    INR 1.24 (L) 12/11/2021    INR 1.27 (L) 12/10/2021    INR 1.42 (L) 12/09/2021    PROTIME 13.5 (L) 12/11/2021    PROTIME 13.9 (L) 12/10/2021    PROTIME 15.4 (L) 12/09/2021     Lab Results   Component Value Date    HGB 8.6 (L) 12/11/2021    HGB 8.3 (L) 12/10/2021    HGB 8.8 (L) 12/09/2021     Lab Results   Component Value Date    HCT 26.6 (L) 12/11/2021    HCT 25.6 (L) 12/10/2021    HCT 27.4 (L) 12/09/2021       Tristen Morales, Pharmacy Intern  12/11/21 08:41 EST         "

## 2021-12-11 NOTE — CONSULTS
PULMONARY CRITICAL CARE CONSULT NOTE      PATIENT IDENTIFICATION:  Name: Tahira Zimmerman  MRN: [id number]  :  1955     Age: 66 y.o.  Sex: female        DATE OF CONSULTATION:  2021  PRIMARY CARE PHYSICIAN    Noemy Queen MD                  CHIEF COMPLAINT: SOB    History of Present Illness:   Tahira Zimmerman is a 66 y.o. 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 In the ER, CXR shows bilateral pulmonary infiltrates. Patient admitted for further treatment.         Review of Systems:   Constitutional: As above   Eyes: negative   ENT/oropharynx: negative   Cardiovascular: negative   Respiratory: As above   Gastrointestinal: negative   Genitourinary: negative   Neurological: negative   Musculoskeletal: negative   Integument/breast: negative   Endocrine: negative   Allergic/Immunologic: negative     Past Medical History:  Past Medical History:   Diagnosis Date   • Anemia    • Cervical disc disorder     Herniated disc, pinched nerve   • Clotting disorder (HCC)     Low platelets from chemo   • Colon polyp    • Deep vein thrombosis (HCC)    • Diverticulosis    • GERD (gastroesophageal reflux disease) 2016   • HL (hearing loss) 2016    I need hearing aids   • Hyperlipidemia 2013    Need my cholesterol rechecked   • Hypertension    • Joint pain     Shoulder pain, torn rotator cuff   • Low back pain    • Neuromuscular disorder (HCC)     Shingles, pinched nerves in my neck   • Osteopenia    • Osteoporosis    • Ovarian cancer (HCC)     ovarian--  spread to lungs 10/2020   • Ovarian cancer on left (HCC)    • Pneumonia     Have had it several times   • Spinal stenosis     Cervical   • Urinary tract infection     Have had several utis        Past Surgical History:  Past Surgical History:   Procedure Laterality Date   • BRONCHOSCOPY N/A 2/10/2021    Procedure: BRONCHOSCOPY with bronchioalveolar lavage;  Surgeon: Jerica Esparza MD;  Location: [facility] ENDOSCOPY;  Service: Pulmonary;  Laterality: N/A;  post op:right lobe pneumonia   • BRONCHOSCOPY N/A 6/15/2021    Procedure: BRONCHOSCOPY with bronchoalveolar lavage;  Surgeon: Jerica Esparza MD;  Location: [facility] ENDOSCOPY;  Service: Pulmonary;  Laterality: N/A;  lung cancer;pneumonia   • CATARACT EXTRACTION     • COLON SURGERY     • COLONOSCOPY  05/26/2018   • EXPLORATORY LAPAROTOMY     • HERNIA REPAIR     • HYSTERECTOMY     • OOPHORECTOMY          Family History:  Family History   Problem Relation Age of Onset   • Hypertension Mother    • Cancer Father    • Hypertension Father    • Hypertension Sister    • Hypertension Brother    • Stroke Brother         Social History:   Social History     Tobacco Use   • Smoking status: Never Smoker   • Smokeless tobacco: Never Used   Substance Use Topics   • Alcohol use: No     Comment: caffeine 32-64oz qd        Allergies:  Allergies   Allergen Reactions   • Morphine Nausea Only and Hives   • Penicillin V Potassium Rash   • Sulfa Antibiotics Rash   • Levaquin [Levofloxacin] Nausea Only and Dizziness     When taken with Coumadin   • Amoxicillin Rash       Home Meds:  Medications Prior to Admission   Medication Sig Dispense Refill Last Dose   • folic acid (FOLVITE) 1 MG tablet Take 1 tablet by mouth Daily. 30 tablet 1    • acetaminophen (TYLENOL) 500 MG tablet Take 1,000 mg by mouth Every 6 (Six) Hours As Needed for Mild Pain .      • Calcium Carbonate 1500 (600 Ca) MG tablet Take 600 mg by mouth 2 (Two) Times a Day.      • calcium carbonate-vitamin d 600-400 MG-UNIT per tablet Take 1 tablet by mouth 2 (Two) Times a Day. 60 tablet 3    • cetirizine (zyrTEC) 10 MG tablet Take 1 tablet by mouth Every Night. 30 tablet 0    • Combivent Respimat  MCG/ACT inhaler INHALE 1  PUFF FOUR TIMES DAILY AS NEEDED FOR SHORTNESS OF BREATH      • cyanocobalamin 1000 MCG/ML injection Inject 1,000 mcg into the appropriate muscle as directed by prescriber Every 28 (Twenty-Eight) Days.      • famotidine (PEPCID) 40 MG tablet Take 1 tablet by mouth Every Morning Before Breakfast. 90 tablet 1    • HYDROcodone-homatropine (HYCODAN) 5-1.5 MG/5ML syrup Take 5 mL by mouth Every 8 (Eight) Hours As Needed for Cough. 473 mL 0    • letrozole (FEMARA) 2.5 MG tablet Take 1 tablet by mouth Daily. 30 tablet 6    • MAGnesium-Oxide 400 (241.3 Mg) MG tablet tablet Take 1 tablet by mouth 3 (Three) Times a Day. 30 each 3    • Misc. Devices (Commode Bedside) misc 1 Device Daily. 1 each 0    • Misc. Devices (Roller Walker) misc 1 Device Daily. 1 each 0    • montelukast (SINGULAIR) 10 MG tablet TAKE 1 TABLET BY MOUTH EVERY NIGHT 30 tablet 2    • Mucus Relief  MG 12 hr tablet TAKE TWO TABLETS BY MOUTH TWICE A DAY 60 tablet 1    • ondansetron (ZOFRAN) 8 MG tablet       • oxyCODONE (ROXICODONE) 20 MG tablet Take 1 tablet by mouth Every 4 (Four) Hours As Needed for Moderate Pain . 180 tablet 0    • oxyCODONE (ROXICODONE) 20 MG tablet Take 1 to 1.5 tabs every 4 hours as needed for moderate pain. 270 tablet 0    • phosphorus (K PHOS NEUTRAL) 155-852-130 MG tablet Take 1 tablet by mouth 3 (Three) Times a Day. 90 tablet 3    • potassium chloride (K-DUR,KLOR-CON) 20 MEQ CR tablet Take 60 mEq by mouth Daily.      • potassium chloride (K-DUR,KLOR-CON) 20 MEQ CR tablet Take 2 tablets by mouth Every Night. 60 tablet 3    • pregabalin (LYRICA) 100 MG capsule Take 1 capsule by mouth 3 (Three) Times a Day. 15 capsule 0    • sertraline (ZOLOFT) 50 MG tablet Take 50 mg by mouth Every Night.      • temazepam (RESTORIL) 30 MG capsule TAKE 1 CAPSULE BY MOUTH AT NIGHT AS NEEDED FOR SLEEP 30 capsule 1    • triamterene-hydrochlorothiazide (DYAZIDE) 37.5-25 MG per capsule Take 1 capsule by mouth Daily. 90 capsule 1    • warfarin (COUMADIN)  "4 MG tablet Take as directed 30 tablet 3        Objective:  tMax 24 hrs: Temp (24hrs), Av °F (36.7 °C), Min:97.8 °F (36.6 °C), Max:98.1 °F (36.7 °C)      Vitals Ranges:   Temp:  [97.8 °F (36.6 °C)-98.1 °F (36.7 °C)] 98.1 °F (36.7 °C)  Heart Rate:  [78-92] 80  Resp:  [18-20] 18  BP: (117-134)/(75-81) 134/81    Intake and Output Last 3 Shifts:   I/O last 3 completed shifts:  In: 500 [I.V.:400; IV Piggyback:100]  Out: 2 [Blood:2]    Exam:  /81 (BP Location: Right arm, Patient Position: Lying)   Pulse 80   Temp 98.1 °F (36.7 °C) (Oral)   Resp 18   Ht 165.1 cm (65\")   Wt 69.9 kg (154 lb 1.6 oz)   LMP  (LMP Unknown)   SpO2 96%   BMI 25.64 kg/m²       21  0105 21  0415   Weight: 69.3 kg (152 lb 11.2 oz) 69.9 kg (154 lb 1.6 oz)     General Appearance: Alert     HEENT:  Normocephalic, without obvious abnormality, atraumaticConjunctiva/corneas clear,.  Normal external ear canals, Nares normal, no drainage  Neck:  Supple, symmetrical, trachea midline. No JVD.  Lungs /Chest wall:   Bilateral basal rhonchi, respirations unlabored symmetrical wall movement.     Heart:  Regular rate and rhythm, systolic murmur PMI left sternal border  Abdomen: Soft, non-tender, no masses, no organomegaly.    Extremities: Trace edema no clubbing or Cyanosis        Data Review:  All labs (24hrs):   Recent Results (from the past 24 hour(s))   Protime-INR    Collection Time: 21  5:52 AM    Specimen: Blood   Result Value Ref Range    Protime 13.5 (L) 19.4 - 28.5 Seconds    INR 1.24 (L) 2.00 - 3.00   Basic Metabolic Panel    Collection Time: 21  5:52 AM    Specimen: Blood   Result Value Ref Range    Glucose 104 (H) 65 - 99 mg/dL    BUN 21 8 - 23 mg/dL    Creatinine 0.71 0.57 - 1.00 mg/dL    Sodium 136 136 - 145 mmol/L    Potassium 4.4 3.5 - 5.2 mmol/L    Chloride 101 98 - 107 mmol/L    CO2 25.0 22.0 - 29.0 mmol/L    Calcium 6.7 (L) 8.6 - 10.5 mg/dL    eGFR Non African Amer 82 >60 mL/min/1.73    BUN/Creatinine " Ratio 29.6 (H) 7.0 - 25.0    Anion Gap 10.0 5.0 - 15.0 mmol/L   CBC Auto Differential    Collection Time: 12/11/21  5:52 AM    Specimen: Blood   Result Value Ref Range    WBC 7.60 3.40 - 10.80 10*3/mm3    RBC 3.27 (L) 3.77 - 5.28 10*6/mm3    Hemoglobin 8.6 (L) 12.0 - 15.9 g/dL    Hematocrit 26.6 (L) 34.0 - 46.6 %    MCV 81.3 79.0 - 97.0 fL    MCH 26.1 (L) 26.6 - 33.0 pg    MCHC 32.1 31.5 - 35.7 g/dL    RDW 19.0 (H) 12.3 - 15.4 %    RDW-SD 54.3 (H) 37.0 - 54.0 fl    MPV 8.4 6.0 - 12.0 fL    Platelets 183 140 - 450 10*3/mm3    Neutrophil % 82.1 (H) 42.7 - 76.0 %    Lymphocyte % 5.5 (L) 19.6 - 45.3 %    Monocyte % 10.9 5.0 - 12.0 %    Eosinophil % 1.3 0.3 - 6.2 %    Basophil % 0.2 0.0 - 1.5 %    Neutrophils, Absolute 6.20 1.70 - 7.00 10*3/mm3    Lymphocytes, Absolute 0.40 (L) 0.70 - 3.10 10*3/mm3    Monocytes, Absolute 0.80 0.10 - 0.90 10*3/mm3    Eosinophils, Absolute 0.10 0.00 - 0.40 10*3/mm3    Basophils, Absolute 0.00 0.00 - 0.20 10*3/mm3    nRBC 0.0 0.0 - 0.2 /100 WBC   Magnesium    Collection Time: 12/11/21  5:52 AM    Specimen: Blood   Result Value Ref Range    Magnesium 1.0 (L) 1.6 - 2.4 mg/dL        Imaging:  [unfilled]    ASSESSMENT:  Pneumonia due to infectious organism  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       Discussed with Dr Chanel Walter, APRN   12/11/2021  16:29 EST      I personally have examined  and interviewed the patient. I have reviewed the history, data, problems, assessment and plan with our NP.  Total Critical care time in direct medical management (   ) minutes, This time specifically excludes time spent performing procedures.    Alex Buchanan MD    12/11/2021  22:04 EST

## 2021-12-11 NOTE — THERAPY EVALUATION
Patient Name: Tahira Zimmerman  : 1955    MRN: [id number]                              Today's Date: 2021       Admit Date: 2021    Visit Dx:     ICD-10-CM ICD-9-CM   1. Pneumonia due to infectious organism, unspecified laterality, unspecified part of lung  J18.9 486   2. Shortness of breath  R06.02 786.05   3. Abnormal head CT  R93.0 793.0   4. Elevated INR  R79.1 790.92   5. Port-A-Cath in place  Z95.828 V45.89     Patient Active Problem List   Diagnosis   • Malignant neoplasm of right ovary (HCC)   • Hypomagnesemia   • Left upper extremity deep vein thrombosis (HCC)   • Pernicious anemia   • Malabsorption due to intolerance, not elsewhere classified   • EMLANI (iron deficiency anemia)   • Pancytopenia due to antineoplastic chemotherapy (HCC)   • Encounter for antineoplastic chemotherapy   • Long term (current) use of anticoagulants   • Monitoring for long-term anticoagulant use   • Leg pain, bilateral   • Osteoarthritis of cervical spine without myelopathy   • Other long term (current) drug therapy   • Pain of metastatic malignancy   • Hyperlipidemia   • Essential hypertension   • Antineoplastic chemotherapy induced anemia   • Cervical disc disorder with radiculopathy   • Restless legs syndrome   • Dysuria   • Fibromyositis   • Port-A-Cath in place   • Encounter for medication management   • Chronic headaches   • Welcome to Medicare preventive visit   • Encounter for screening mammogram for breast cancer   • Postmenopausal   • Screening for diabetes mellitus   • Screening, ischemic heart disease   • Encounter for hepatitis C screening test for low risk patient   • Need for immunization against influenza   • Coumadin toxicity   • Anxiety   • Brain metastases (HCC)   • Deep vein thrombosis (HCC)   • Family history of cerebrovascular accident (CVA)   • Family history of diabetes mellitus   • Insomnia   • Lung nodule   • Mass of skin   • Osteoporosis   • Shingles   • History of DVT (deep vein  thrombosis)   • Depression   • Anemia   • Mastoiditis of left side   • Encounter for care related to vascular access port   • Iron deficiency anemia   • Malabsorption of iron   • Neck pain   • Bone metastases (HCC)   • Pneumonia of right lower lobe due to infectious organism   • Nasal congestion with rhinorrhea   • Cough   • Chest pain   • Elevated international normalized ratio (INR) due to prior anticoagulant medication ingestion   • Mucus plugging of bronchi   • Encephalopathy acute   • Hypotension   • Pneumonia due to COVID-19 virus   • Cytokine release syndrome, grade 2   • Pneumonia due to infectious organism   • Coumadin toxicity   • Hypokalemia   • AMS (altered mental status)     Past Medical History:   Diagnosis Date   • Anemia 2013   • Cervical disc disorder 2013    Herniated disc, pinched nerve   • Clotting disorder (HCC) 2013    Low platelets from chemo   • Colon polyp 2013   • Deep vein thrombosis (HCC) 2013   • Diverticulosis 2013   • GERD (gastroesophageal reflux disease) 2016   • HL (hearing loss) 2016    I need hearing aids   • Hyperlipidemia 2013    Need my cholesterol rechecked   • Hypertension    • Joint pain 2013    Shoulder pain, torn rotator cuff   • Low back pain 2013   • Neuromuscular disorder (HCC) 2015    Shingles, pinched nerves in my neck   • Osteopenia 2014   • Osteoporosis    • Ovarian cancer (HCC)     ovarian--  spread to lungs 10/2020   • Ovarian cancer on left (HCC) 1995   • Pneumonia 2010    Have had it several times   • Spinal stenosis 2013    Cervical   • Urinary tract infection 2013    Have had several utis     Past Surgical History:   Procedure Laterality Date   • BRONCHOSCOPY N/A 2/10/2021    Procedure: BRONCHOSCOPY with bronchioalveolar lavage;  Surgeon: Jerica Esparza MD;  Location: [facility] ENDOSCOPY;  Service: Pulmonary;  Laterality: N/A;  post op:right lobe pneumonia   • BRONCHOSCOPY N/A 6/15/2021    Procedure: BRONCHOSCOPY with bronchoalveolar lavage;  Surgeon: Draw, Azmi,  MD;  Location: [facility] ENDOSCOPY;  Service: Pulmonary;  Laterality: N/A;  lung cancer;pneumonia   • CATARACT EXTRACTION     • COLON SURGERY     • COLONOSCOPY  05/26/2018   • EXPLORATORY LAPAROTOMY     • HERNIA REPAIR     • HYSTERECTOMY     • OOPHORECTOMY        General Information     Row Name 12/11/21 1134          Physical Therapy Time and Intention    Document Type evaluation  -     Mode of Treatment physical therapy  -HC     Row Name 12/11/21 1134          General Information    Patient Profile Reviewed yes  -HC     Prior Level of Function independent:  pt reports ambulating with cane, negotiating 14 steps to bedroom, does not drive, 5L O2  -HC     Existing Precautions/Restrictions fall; oxygen therapy device and L/min  -HC     Barriers to Rehab medically complex  -HC     Row Name 12/11/21 1134          Living Environment    Lives With child(quinn), adult  resides with daughter  -HC     Row Name 12/11/21 1134          Home Main Entrance    Number of Stairs, Main Entrance none  -HC     Row Name 12/11/21 1134          Stairs Within Home, Primary    Stairs, Within Home, Primary 14 to bedroom  -HC     Number of Stairs, Within Home, Primary other (see comments)  -HC     Row Name 12/11/21 1134          Cognition    Orientation Status (Cognition) oriented x 3  -HC     Row Name 12/11/21 1134          Safety Issues, Functional Mobility    Safety Issues Affecting Function (Mobility) insight into deficits/self-awareness; safety precaution awareness  -     Impairments Affecting Function (Mobility) balance; postural/trunk control; endurance/activity tolerance; shortness of breath; strength; pain  -HC           User Key  (r) = Recorded By, (t) = Taken By, (c) = Cosigned By    Initials Name Provider Type    HC Lillian Villalta, PT Physical Therapist               Mobility     Row Name 12/11/21 1135          Bed Mobility    Bed Mobility supine-sit  -HC     Supine-Sit [place] (Bed Mobility) moderate assist (50% patient  effort)  -HC     Row Name 12/11/21 1135          Bed-Chair Transfer    Bed-Chair [place] (Transfers) moderate assist (50% patient effort)  -HC     Row Name 12/11/21 1135          Sit-Stand Transfer    Sit-Stand [place] (Transfers) moderate assist (50% patient effort)  -HC     Row Name 12/11/21 1135          Gait/Stairs (Locomotion)    [place] Level (Gait) moderate assist (50% patient effort)  -     Assistive Device (Gait) walker, front-wheeled  -     Distance in Feet (Gait) 10 ft, 15 ft  -HC     Deviations/Abnormal Patterns (Gait) gait speed decreased  -     Comment (Gait/Stairs) high risk for falls, impaired balance  -           User Key  (r) = Recorded By, (t) = Taken By, (c) = Cosigned By    Initials Name Provider Type     Lillian Villalta, PT Physical Therapist               Obj/Interventions     Row Name 12/11/21 1136          Range of Motion Comprehensive    General Range of Motion no range of motion deficits identified  -     Row Name 12/11/21 1136          Strength Comprehensive (MMT)    Comment, General Manual Muscle Testing (MMT) Assessment BUEs 4-/5, BLEs 4-/5  -     Row Name 12/11/21 1136          Balance    Balance Assessment sitting static balance; sitting dynamic balance; standing static balance; standing dynamic balance  -     Static Sitting Balance WFL  -     Dynamic Sitting Balance mild impairment  -     Static Standing Balance moderate impairment  -     Dynamic Standing Balance moderate impairment; severe impairment  -           User Key  (r) = Recorded By, (t) = Taken By, (c) = Cosigned By    Initials Name Provider Type     Lillian Villalta, PT Physical Therapist               Goals/Plan     Row Name 12/11/21 1138          Bed Mobility Goal 1 (PT)    Activity/Assistive Device (Bed Mobility Goal 1, PT) bed mobility activities, all  -HC     [place] Level/Cues Needed (Bed Mobility Goal 1, PT) standby assist  -     Time Frame (Bed Mobility Goal 1, PT)  2 weeks  -HC     Row Name 12/11/21 1138          Transfer Goal 1 (PT)    Activity/Assistive Device (Transfer Goal 1, PT) transfers, all  -HC     [place] Level/Cues Needed (Transfer Goal 1, PT) standby assist  -HC     Time Frame (Transfer Goal 1, PT) 2 weeks  -HC     Row Name 12/11/21 1138          Gait Training Goal 1 (PT)    Activity/Assistive Device (Gait Training Goal 1, PT) gait (walking locomotion); assistive device use  -HC     [place] Level (Gait Training Goal 1, PT) standby assist  -HC     Distance (Gait Training Goal 1, PT) 100 ft  -HC     Time Frame (Gait Training Goal 1, PT) 2 weeks  -HC           User Key  (r) = Recorded By, (t) = Taken By, (c) = Cosigned By    Initials Name Provider Type    HC Lillian Villalta, PT Physical Therapist               Clinical Impression     Row Name 12/11/21 1136          Pain    Additional Documentation Pain Scale: Numbers Pre/Post-Treatment (Group)  -     Row Name 12/11/21 1136          Pain Scale: Numbers Pre/Post-Treatment    Pretreatment Pain Rating 0/10 - no pain  -     Posttreatment Pain Rating 0/10 - no pain  -     Row Name 12/11/21 1136          Plan of Care Review    Outcome Summary Pt is 67 yo female admitted for SOA, PNA, coumadin toxicity, candidemia, ?infected port.  Pt s/p removal of port on 12/10/2021.  Pt has ovarian cancer with mets to brain, bone, abdominal organs.  -     Row Name 12/11/21 1136          Therapy Assessment/Plan (PT)    Patient/Family Therapy Goals Statement (PT) increase strength  -     Rehab Potential (PT) good, to achieve stated therapy goals  -     Criteria for Skilled Interventions Met (PT) yes; meets criteria  -     Predicted Duration of Therapy Intervention (PT) until d/c  -HC     Row Name 12/11/21 1136          Vital Signs    Intra SpO2 (%) 87  -HC     O2 Delivery Intra Treatment supplemental O2  5L  -HC     Post SpO2 (%) 91  -HC     O2 Delivery Post Treatment supplemental O2  5L  -HC     Row Name 12/11/21  1136          Positioning and Restraints    Pre-Treatment Position in bed  -HC     Post Treatment Position chair  -HC     In Chair notified nsg; call light within reach; encouraged to call for assist; exit alarm on  -           User Key  (r) = Recorded By, (t) = Taken By, (c) = Cosigned By    Initials Name Provider Type     Lillian Villalta, PT Physical Therapist               Outcome Measures     Row Name 12/11/21 1139          How much help from another person do you currently need...    Turning from your back to your side while in flat bed without using bedrails? 3  -HC     Moving from lying on back to sitting on the side of a flat bed without bedrails? 3  -HC     Moving to and from a bed to a chair (including a wheelchair)? 2  -HC     Standing up from a chair using your arms (e.g., wheelchair, bedside chair)? 2  -HC     Climbing 3-5 steps with a railing? 1  -HC     To walk in hospital room? 2  -HC     AM-PAC 6 Clicks Score (PT) 13  -     Row Name 12/11/21 1139          Modified Darke Scale    Modified Michaela Scale 4 - Moderately severe disability.  Unable to walk without assistance, and unable to attend to own bodily needs without assistance.  -     Row Name 12/11/21 1139          Functional Assessment    Outcome Measure Options AM-PAC 6 Clicks Basic Mobility (PT); Modified Darke  -           User Key  (r) = Recorded By, (t) = Taken By, (c) = Cosigned By    Initials Name Provider Type     Lillian Villalta, PT Physical Therapist                             Physical Therapy Education                 Title: PT OT SLP Therapies (In Progress)     Topic: Physical Therapy (In Progress)     Point: Mobility training (In Progress)     Learning Progress Summary           Patient Acceptance, E, NR by  at 12/11/2021 1139                   Point: Precautions (In Progress)     Learning Progress Summary           Patient Acceptance, E, NR by  at 12/11/2021 1139                               User Key      Initials Effective Dates Name Provider Type Discipline     06/16/21 -  Lillian Villalta, PT Physical Therapist PT              PT Recommendation and Plan  Planned Therapy Interventions (PT): balance training, bed mobility training, gait training, neuromuscular re-education, strengthening, patient/family education, postural re-education, transfer training, stair training, ROM (range of motion)  Plan of Care Reviewed With: patient  Progress: improving  Outcome Summary: Pt is 65 yo female admitted for SOA, PNA, coumadin toxicity, candidemia, ?infected port.  Pt s/p removal of port on 12/10/2021.  Pt has ovarian cancer with mets to brain, bone, abdominal organs.  Pt presnets with global weakness, poor endurance.  Pt requiring Luca-modA for transfers.  Pt initially ambulated to bathroom with HHA-modA and poor balance observed.  Pt then ambulated using RW with improved balance, however pt continuing to require Luca due to weakness.  Pt with SOA and fatigue noted after mobilizing to/from bathroom with O2 measuring 90% after seated rest break.  Unable to ambulate longer distance due to weakness.  Pt far from mobility baseline of ambulating indep and negotiating 14 steps to bedroom.  Pt will need extensive IP rehab to address deficits.  PT will follow 3x/week while at [facility].     Time Calculation:    PT Charges     Row Name 12/11/21 1141             Time Calculation    Start Time 0908  -      Stop Time 0935  -      Time Calculation (min) 27 min  -      PT Received On 12/11/21  -      PT - Next Appointment 12/13/21  -      PT Goal Re-Cert Due Date 12/25/21  -              Time Calculation- PT    Total Timed Code Minutes- PT 10 minute(s)  -            User Key  (r) = Recorded By, (t) = Taken By, (c) = Cosigned By    Initials Name Provider Type     Lillian Villalta, PT Physical Therapist              Therapy Charges for Today     Code Description Service Date Service Provider Modifiers Qty    12996833968  PT  EVAL MOD COMPLEXITY 3 12/11/2021 Lillian Villalta, PT GP 1    [id number] HC GAIT TRAINING EA 15 MIN 12/11/2021 Lillian Villalta, PT GP 1          PT G-Codes  Outcome Measure Options: AM-PAC 6 Clicks Basic Mobility (PT), Modified Wheeler  AM-PAC 6 Clicks Score (PT): 13  Modified Michaela Scale: 4 - Moderately severe disability.  Unable to walk without assistance, and unable to attend to own bodily needs without assistance.    Lillian Villalta, PT  12/11/2021

## 2021-12-12 NOTE — DISCHARGE PLACEMENT REQUEST
"Tahira Roblero (66 y.o. Female)             Date of Birth Social Security Number Address Home Phone MRN    1955  [id number] [id number] [phone] [id number]    Sikhism Marital Status             [employer]        Admission Date Admission Type Admitting Provider Attending Provider Department, Room/Bed    12/8/21 Emergency Jaky Pettit MD Palmer, Karen, DO [facility] 3A MEDICAL INPATIENT, 345/1    Discharge Date Discharge Disposition Discharge Destination                         Attending Provider: Carlee Carlson DO    Allergies: Morphine, Penicillin V Potassium, Sulfa Antibiotics, Levaquin [Levofloxacin], Amoxicillin    Isolation: None   Infection: None   Code Status: No CPR   Advance Care Planning Activity    Ht: 165.1 cm (65\")   Wt: 70.4 kg (155 lb 4.8 oz)    Admission Cmt: None   Principal Problem: Port-A-Cath in place [Z95.828]                 Active Insurance as of 12/8/2021     Primary Coverage     Payor Plan Insurance Group Employer/Plan Group    MEDICARE MEDICARE A & B      Payor Plan Address Payor Plan Phone Number Payor Plan Fax Number Effective Dates    [address] [phone]  7/1/2020 - None Entered    [facility] [address]       Subscriber Name Subscriber Birth Date Member ID       TAHIRA ROBLERO 1955 [id number]           Secondary Coverage     Payor Plan Insurance Group Employer/Plan Group    [employer]      Payor Plan Address Payor Plan Phone Number Payor Plan Fax Number Effective Dates    [address] [phone]  7/1/2020 - None Entered    [place] [address]       Subscriber Name Subscriber Birth Date Member ID       TAHIRA ROBLERO 1955 [id number]           Tertiary Coverage     Payor Plan Insurance Group Employer/Plan Group    [place]      Payor Plan Address Payor Plan Phone Number Payor Plan Fax Number Effective Dates    [address]   7/1/2020 " "- None Entered    [place] [address]       Subscriber Name Subscriber Birth Date Member ID       EDE ROBLERO 1955 [id number]                 Emergency Contacts      (Rel.) Home Phone Work Phone Mobile Phone    DANIELLE MOLINA (Daughter) [phone] -- [phone]               History & Physical      Essing-Josiane Malin APRN at 21 0490     Attestation signed by Jaky Pettit MD at 21 0917    Attending Physician Attestation    I have reviewed the mid-level provider documentation, agree with the documentation, medical decision making and treatment plan as outlined by the mid-level provider. I have reviewed this documentation and agree.                        [facility] Medicine Services      Patient Name: Ede Roblero  : 1955  MRN: [id number]  Primary Care Physician:  Noemy Queen MD  Date of admission: 2021      Subjective      Chief Complaint: shortness of air     History of Present Illness: Ede Roblero is a 66 y.o. female who presented to [facility] on 2021 complaining of increased shortness of air past several days.  She reports cough productive of yellowish-whitish sputum.  She denies any fever chills or diaphoresis.  She reports using oxygen at home at 6 to 8 L.  She is currently stable on same.  He has a past medical history of ovarian cancer initially diagnosed in .  He has metastasis to the brain, bone, and various abdominal organs.  He is being followed by oncology and receiving radiation and oral chemotherapy.  She is awake alert and a good historian.  She denies any altered mental status or confusion.  She reports \"I am just fine\".  She appears to be answering appropriately.  She reports she is a DNR with regards to no cardiac compressions however she is willing to be intubated if needed.  WBC is not elevated and she is afebrile.  Chest x-ray per radiology today:    \"1.Bilateral pulmonary " "infiltrates. Given history of malignancy this  might relate to lymphangitic spread of tumor. An underlying inflammatory  infectious process would be in the differential. Correlation with  clinical findings suggested.  \"    CT head per radiology today:    \"IMPRESSION:  1.Intracranial calcifications which have been suggested and could  reflect sequela to treated brain metastases.  2.Hyperostosis frontalis  3.Sclerotic area involving the clivus which could relate to known  metastatic disease. There also are some sclerotic areas involving the  calvarium near the convexity of the head.  \"    She has no slurred speech or facial droop and no focal deficits.  She reports a past medical history of DVTs and is on home warfarin.  INR today was greater than 8 and she was given vitamin K in the emergency department.  Potassium was 3.3.  Review of records shows she was admitted here in August for COVID-19.  She is COVID-19 negative today.  She was started on IV ceftriaxone and IV azithromycin and will be admitted for further evaluation and treatment.  Medical history also includes hypertension, hyperlipidemia, anxiety and depression and anemia.          Review of Systems   Constitutional: Positive for decreased appetite and weight loss.   HENT: Negative.    Eyes: Negative.    Cardiovascular: Positive for dyspnea on exertion.   Respiratory: Positive for cough, shortness of breath and sputum production.    Endocrine: Negative.    Hematologic/Lymphatic: Negative.    Skin: Negative.    Musculoskeletal: Positive for muscle weakness.   Gastrointestinal: Negative.    Genitourinary: Negative.    Neurological: Positive for weakness.   Psychiatric/Behavioral: Negative.    Allergic/Immunologic: Negative.        Personal History     Past Medical History:   Diagnosis Date   • Anemia 2013   • Cervical disc disorder 2013    Herniated disc, pinched nerve   • Clotting disorder (HCC) 2013    Low platelets from chemo   • Colon polyp 2013   • Deep " vein thrombosis (HCC) 2013   • Diverticulosis 2013   • GERD (gastroesophageal reflux disease) 2016   • HL (hearing loss) 2016    I need hearing aids   • Hyperlipidemia 2013    Need my cholesterol rechecked   • Hypertension    • Joint pain 2013    Shoulder pain, torn rotator cuff   • Low back pain 2013   • Neuromuscular disorder (HCC) 2015    Shingles, pinched nerves in my neck   • Osteopenia 2014   • Osteoporosis    • Ovarian cancer (HCC)     ovarian--  spread to lungs 10/2020   • Ovarian cancer on left (HCC) 1995   • Pneumonia 2010    Have had it several times   • Spinal stenosis 2013    Cervical   • Urinary tract infection 2013    Have had several utis       Past Surgical History:   Procedure Laterality Date   • BRONCHOSCOPY N/A 2/10/2021    Procedure: BRONCHOSCOPY with bronchioalveolar lavage;  Surgeon: Jerica Esparza MD;  Location: [facility] ENDOSCOPY;  Service: Pulmonary;  Laterality: N/A;  post op:right lobe pneumonia   • BRONCHOSCOPY N/A 6/15/2021    Procedure: BRONCHOSCOPY with bronchoalveolar lavage;  Surgeon: Jerica Esparza MD;  Location: [facility] ENDOSCOPY;  Service: Pulmonary;  Laterality: N/A;  lung cancer;pneumonia   • CATARACT EXTRACTION     • COLON SURGERY     • COLONOSCOPY  05/26/2018   • EXPLORATORY LAPAROTOMY     • HERNIA REPAIR     • HYSTERECTOMY     • OOPHORECTOMY         Family History: family history includes Cancer in her father; Hypertension in her brother, father, mother, and sister; Stroke in her brother. Otherwise pertinent FHx was reviewed and not pertinent to current issue.    Social History:  reports that she has never smoked. She has never used smokeless tobacco. She reports that she does not drink alcohol and does not use drugs.    Home Medications:  Prior to Admission Medications     Prescriptions Last Dose Informant Patient Reported? Taking?    acetaminophen (TYLENOL) 500 MG tablet   Yes No    Take 1,000 mg by mouth Every 6 (Six) Hours As Needed for Mild Pain .    Calcium Carbonate 1500 (600  Ca) MG tablet   Yes No    Take 600 mg by mouth 2 (Two) Times a Day.    calcium carbonate-vitamin d 600-400 MG-UNIT per tablet   No No    Take 1 tablet by mouth 2 (Two) Times a Day.    cetirizine (zyrTEC) 10 MG tablet   No No    Take 1 tablet by mouth Every Night.    Combivent Respimat  MCG/ACT inhaler   Yes No    INHALE 1 PUFF FOUR TIMES DAILY AS NEEDED FOR SHORTNESS OF BREATH    cyanocobalamin 1000 MCG/ML injection   Yes No    Inject 1,000 mcg into the appropriate muscle as directed by prescriber Every 28 (Twenty-Eight) Days.    famotidine (PEPCID) 40 MG tablet   No No    Take 1 tablet by mouth Every Morning Before Breakfast.    folic acid (FOLVITE) 1 MG tablet   No No    Take 1 tablet by mouth Daily.    HYDROcodone-homatropine (HYCODAN) 5-1.5 MG/5ML syrup   No No    Take 5 mL by mouth Every 8 (Eight) Hours As Needed for Cough.    letrozole (FEMARA) 2.5 MG tablet   No No    Take 1 tablet by mouth Daily.    MAGnesium-Oxide 400 (241.3 Mg) MG tablet tablet   No No    Take 1 tablet by mouth 3 (Three) Times a Day.    Misc. Devices (Commode Bedside) misc   No No    1 Device Daily.    Misc. Devices (Roller Walker) misc   No No    1 Device Daily.    montelukast (SINGULAIR) 10 MG tablet   No No    TAKE 1 TABLET BY MOUTH EVERY NIGHT    Mucus Relief  MG 12 hr tablet   No No    TAKE TWO TABLETS BY MOUTH TWICE A DAY    ondansetron (ZOFRAN) 8 MG tablet   Yes No    oxyCODONE (ROXICODONE) 20 MG tablet   No No    Take 1 tablet by mouth Every 4 (Four) Hours As Needed for Moderate Pain .    oxyCODONE (ROXICODONE) 20 MG tablet   No No    Take 1 to 1.5 tabs every 4 hours as needed for moderate pain.    phosphorus (K PHOS NEUTRAL) 155-852-130 MG tablet   No No    Take 1 tablet by mouth 3 (Three) Times a Day.    potassium chloride (K-DUR,KLOR-CON) 20 MEQ CR tablet   Yes No    Take 60 mEq by mouth Daily.    potassium chloride (K-DUR,KLOR-CON) 20 MEQ CR tablet   No No    Take 2 tablets by mouth Every Night.    pregabalin  (LYRICA) 100 MG capsule   No No    Take 1 capsule by mouth 3 (Three) Times a Day.    sertraline (ZOLOFT) 50 MG tablet   Yes No    Take 50 mg by mouth Every Night.    temazepam (RESTORIL) 30 MG capsule   No No    TAKE 1 CAPSULE BY MOUTH AT NIGHT AS NEEDED FOR SLEEP    triamterene-hydrochlorothiazide (DYAZIDE) 37.5-25 MG per capsule   No No    Take 1 capsule by mouth Daily.    warfarin (COUMADIN) 2 MG tablet   No No    DVT  Indications: Atrial Fibrillation    Patient taking differently:  5 mg. DVT  Indications: Atrial Fibrillation    warfarin (COUMADIN) 4 MG tablet   No No    Take as directed            Allergies:  Allergies   Allergen Reactions   • Morphine Nausea Only and Hives   • Penicillin V Potassium Rash   • Sulfa Antibiotics Rash   • Levaquin [Levofloxacin] Nausea Only and Dizziness     When taken with Coumadin   • Amoxicillin Rash       Objective      Vitals:   Temp:  [98 °F (36.7 °C)] 98 °F (36.7 °C)  Heart Rate:  [85-95] 85  Resp:  [22] 22  BP: (116-133)/(67-77) 118/68  Flow (L/min):  [4-8] 4    Physical Exam  Vitals reviewed.   Constitutional:       Appearance: Normal appearance. She is obese.   HENT:      Head: Normocephalic.      Comments: alopecia     Right Ear: External ear normal.      Left Ear: External ear normal.      Nose: Nose normal.      Mouth/Throat:      Mouth: Mucous membranes are moist.   Eyes:      Extraocular Movements: Extraocular movements intact.   Cardiovascular:      Rate and Rhythm: Normal rate and regular rhythm.      Pulses: Normal pulses.      Heart sounds: Normal heart sounds.   Pulmonary:      Effort: Pulmonary effort is normal.      Breath sounds: Normal breath sounds.   Abdominal:      Palpations: Abdomen is soft.   Genitourinary:     Comments: deferred  Musculoskeletal:         General: Normal range of motion.      Cervical back: Normal range of motion and neck supple.   Skin:     General: Skin is warm and dry.   Neurological:      General: No focal deficit present.       Mental Status: She is alert and oriented to person, place, and time.   Psychiatric:         Mood and Affect: Mood normal.         Behavior: Behavior normal.         Thought Content: Thought content normal.         Judgment: Judgment normal.         Result Review    Result Review:  I have personally reviewed the results from the time of this admission to 12/8/2021 22:06 EST and agree with these findings:  [x]  Laboratory  [x]  Microbiology  [x]  Radiology  []  EKG/Telemetry   []  Cardiology/Vascular   []  Pathology  [x]  Old records  []  Other:  Most notable findings include: afebrile, CXR per radiology bilateral infiltrates, CT head per radiology known mets, INR > 8 and K  3.3     Assessment/Plan        Active Hospital Problems:  Active Hospital Problems    Diagnosis    • Pneumonia due to infectious organism    • Coumadin toxicity    • AMS (altered mental status)    • Hypokalemia    • Bone metastases (HCC)    • Depression    • Brain metastases (HCC)    • Malignant neoplasm of right ovary (HCC)      Plan:    Pneumonia due to infectious organism, COVID-19 negative, chest x-ray per radiology shows bilateral pulmonary infiltrates, WBC not elevated, continue IV ceftriaxone and IV azithromycin until blood cultures resulted, DuoNebs every 4 hours as needed and albuterol every 6 hours as needed, stable on home 6 L oxygen via nasal cannula    Coumadin toxicity INR greater than 8 with past medical history of DVT, hold home Coumadin for now given vitamin K in ED, INR daily pharmacy to dose warfarin to INR    Altered mental status?  Patient appears awake and alert and answers appropriately, she denies confusion, CT head negative for acute changes, mets to brain known    Hypokalemia mild potassium 3.3, potassium protocol in place repeat BNP in a.m. magnesium in a.m.    Ovarian  cancer with mets to bone brain and abdominal, followed by  of oncology, consulted Home meds unverified at this time reorder pending verification  pharmacy    Hypertension, controlled on home meds unverified at this time reorder pending verification from pharmacy monitor BP    Anxiety depression, home meds unverified at this time reorder pending verification from pharmacy    Poor appetite, weight loss likely secondary to malignancy, boost 3 times daily ordered, dietary consulted    DVT prophylaxis:  No DVT prophylaxis order currently exists.    CODE STATUS:    Level Of Support Discussed With: Patient  Code Status (Patient has no pulse and is not breathing): No CPR (Do Not Attempt to Resuscitate)  Medical Interventions (Patient has pulse or is breathing): Full Support    Admission Status:  I believe this patient meets inpatient status.    I discussed the patient's findings and my recommendations with patient.    This patient has been examined wearing appropriate Personal Protective Equipment    Signature: Electronically signed by IRENA Malave, 21, 10:11 PM EST.    Electronically signed by Jaky Pettit MD at 21 0917          Physician Progress Notes (last 24 hours)      Alex Buchanan MD at 21 1559          PULMONARY CRITICAL CARE Progress  NOTE      PATIENT IDENTIFICATION:  Name: Tahira Zimmerman  MRN: [id number]  :  1955     Age: 66 y.o.  Sex: female    DATE OF Note:  2021   Referring Physician: Jaky Pettit MD                  Subjective:   Feeling better, no new issue, no SOB no chest pain, no nausea or vomiting, no change in bowel habit, no dysuria,  no new  skin rash or itching.      Objective:  tMax 24 hrs: Temp (24hrs), Av.2 °F (36.8 °C), Min:97.9 °F (36.6 °C), Max:98.7 °F (37.1 °C)      Vitals Ranges:   Temp:  [97.9 °F (36.6 °C)-98.7 °F (37.1 °C)] 98.7 °F (37.1 °C)  Heart Rate:  [66-90] 90  Resp:  [16-20] 16  BP: (106-152)/(66-87) 120/67    Intake and Output Last 3 Shifts:   I/O last 3 completed shifts:  In: 1324 [P.O.:420; I.V.:804; IV Piggyback:100]  Out: -     Exam:  /67 (BP Location:  "Right arm, Patient Position: Sitting)   Pulse 90   Temp 98.7 °F (37.1 °C) (Oral)   Resp 16   Ht 165.1 cm (65\")   Wt 70.4 kg (155 lb 4.8 oz)   LMP  (LMP Unknown)   SpO2 96%   BMI 25.84 kg/m²     General Appearance:     HEENT:  Normocephalic, without obvious abnormality, Conjunctiva/corneas clear,.  Normal external ear canals, Nares normal, no drainage     Neck:  Supple, symmetrical, trachea midline. No JVD.  Lungs /Chest wall:   Bilateral basal rhonchi, respirations unlabored symmetrical wall movement.     Heart:  Regular rate and rhythm, systolic murmur PMI left sternal border  Abdomen: Soft, non-tender, no masses, no organomegaly.    Extremities: Trace edema no clubbing or Cyanosis        Medications:    Current Facility-Administered Medications:   •  acetaminophen (TYLENOL) tablet 650 mg, 650 mg, Oral, Q4H PRN **OR** acetaminophen (TYLENOL) 160 MG/5ML solution 650 mg, 650 mg, Oral, Q4H PRN **OR** acetaminophen (TYLENOL) suppository 650 mg, 650 mg, Rectal, Q4H PRN, Jose Urena MD  •  albuterol sulfate HFA (PROVENTIL HFA;VENTOLIN HFA;PROAIR HFA) inhaler 2 puff, 2 puff, Inhalation, Q6H PRN, Jose Urena MD  •  sennosides-docusate (PERICOLACE) 8.6-50 MG per tablet 2 tablet, 2 tablet, Oral, BID, 2 tablet at 12/12/21 0854 **AND** polyethylene glycol (MIRALAX) packet 17 g, 17 g, Oral, Daily PRN **AND** bisacodyl (DULCOLAX) EC tablet 5 mg, 5 mg, Oral, Daily PRN **AND** bisacodyl (DULCOLAX) suppository 10 mg, 10 mg, Rectal, Daily PRN, Jose Urena MD  •  budesonide (PULMICORT) nebulizer solution 0.5 mg, 0.5 mg, Nebulization, BID - RT, Amanda Walter, APRN, 0.5 mg at 12/12/21 0742  •  calcium 500 mg vitamin D 5 mcg (200 UT) per tablet 1 tablet, 1 tablet, Oral, BID, Jose Urena MD, 1 tablet at 12/12/21 0854  •  cefTRIAXone (ROCEPHIN) 1 g in sodium chloride 0.9 % 100 mL IVPB, 1 g, Intravenous, Q24H, Amanda Walter APRN, Last Rate: 200 mL/hr at 12/11/21 2237, 1 g at " 12/11/21 2237  •  cetirizine (zyrTEC) tablet 10 mg, 10 mg, Oral, Nightly, Jose Urena MD, 10 mg at 12/11/21 2135  •  enoxaparin (LOVENOX) syringe 70 mg, 1 mg/kg, Subcutaneous, Q12H, Jose Urena MD, 70 mg at 12/12/21 0853  •  erythromycin (ROMYCIN) ophthalmic ointment, , Right Eye, 4x Daily, Jose Urena MD, Given at 12/12/21 1249  •  famotidine (PEPCID) tablet 40 mg, 40 mg, Oral, QAM AC, Jose Urena MD, 40 mg at 12/12/21 0855  •  folic acid (FOLVITE) tablet 1,000 mcg, 1,000 mcg, Oral, Daily, Jose Urena MD, 1,000 mcg at 12/12/21 0855  •  guaiFENesin (MUCINEX) 12 hr tablet 1,200 mg, 1,200 mg, Oral, BID, Jose Urena MD, 1,200 mg at 12/12/21 0855  •  ipratropium-albuterol (DUO-NEB) nebulizer solution 3 mL, 3 mL, Nebulization, Q4H PRN, Jose Urena MD, 3 mL at 12/11/21 0436  •  ipratropium-albuterol (DUO-NEB) nebulizer solution 3 mL, 3 mL, Nebulization, 4x Daily - RT, Amanda Walter APRN, 3 mL at 12/12/21 1030  •  letrozole (FEMARA) tablet 2.5 mg, 2.5 mg, Oral, Daily, Jose Urena MD, 2.5 mg at 12/12/21 0854  •  magnesium oxide (MAG-OX) tablet 400 mg, 400 mg, Oral, TID, Jose Urena MD, 400 mg at 12/12/21 1535  •  magnesium sulfate 4 gram infusion - Mg less than or equal to 1mg/dL, 4 g, Intravenous, PRN, Last Rate: 25 mL/hr at 12/11/21 1830, 4 g at 12/11/21 1830 **OR** magnesium sulfate 3 gram infusion (1gm x 3) - Mg 1.1 - 1.5 mg/dL, 1 g, Intravenous, PRN **OR** Magnesium Sulfate 2 gram infusion- Mg 1.6 - 1.9 mg/dL, 2 g, Intravenous, PRN, Jaky Pettit MD  •  melatonin tablet 5 mg, 5 mg, Oral, Nightly PRN, Jose Urena MD  •  micafungin sodium (MYCAMINE) 150 mg in sodium chloride 0.9 % 100 mL IVPB, 150 mg, Intravenous, Q24H, Cony Shelby MD, Last Rate: 100 mL/hr at 12/11/21 1716, 150 mg at 12/11/21 1716  •  montelukast (SINGULAIR) tablet 10 mg, 10 mg, Oral, Nightly, Jose Urena,  MD, 10 mg at 12/11/21 2134  •  ondansetron (ZOFRAN) tablet 4 mg, 4 mg, Oral, Q6H PRN **OR** ondansetron (ZOFRAN) injection 4 mg, 4 mg, Intravenous, Q6H PRN, Jose Urena MD, 4 mg at 12/12/21 1027  •  oxyCODONE (ROXICODONE) immediate release tablet 20 mg, 20 mg, Oral, Q4H PRN, Jose Urena MD, 20 mg at 12/10/21 1546  •  Pharmacy to Dose enoxaparin (LOVENOX), , Does not apply, Continuous PRN, Jose Urena MD  •  Pharmacy to dose warfarin, , Does not apply, Continuous PRN, Jose Urena MD  •  phosphorus (K PHOS NEUTRAL) tablet 1 tablet, 250 mg, Oral, TID, Jose Urena MD, 1 tablet at 12/12/21 1535  •  potassium chloride (K-DUR,KLOR-CON) CR tablet 20 mEq, 20 mEq, Oral, BID With Meals, Jose Urena MD, 20 mEq at 12/12/21 0855  •  potassium chloride (K-DUR,KLOR-CON) CR tablet 40 mEq, 40 mEq, Oral, PRN, 40 mEq at 12/09/21 0354 **OR** potassium chloride (KLOR-CON) packet 40 mEq, 40 mEq, Oral, PRN **OR** potassium chloride 10 mEq in 100 mL IVPB, 10 mEq, Intravenous, Q1H PRN, Jose Urena MD  •  pregabalin (LYRICA) capsule 100 mg, 100 mg, Oral, TID, Jose Urena MD, 100 mg at 12/12/21 1535  •  sertraline (ZOLOFT) tablet 50 mg, 50 mg, Oral, Nightly, Jose Urena MD, 50 mg at 12/11/21 2134  •  sodium chloride 0.9 % flush 10 mL, 10 mL, Intravenous, PRN, Jose Urena MD  •  sodium chloride 0.9 % flush 3 mL, 3 mL, Intravenous, Q12H, Jose Urena MD, 3 mL at 12/12/21 0856  •  sodium chloride 0.9 % flush 3-10 mL, 3-10 mL, Intravenous, PRN, Jose Urena MD  •  sodium chloride 0.9 % infusion, 75 mL/hr, Intravenous, Continuous, Jose Urena MD, Last Rate: 75 mL/hr at 12/12/21 0017, 75 mL/hr at 12/12/21 0017  •  temazepam (RESTORIL) capsule 30 mg, 30 mg, Oral, Nightly PRN, Jose Urena MD  •  triamterene-hydrochlorothiazide (MAXZIDE-25) 37.5-25 MG per tablet 1 tablet, 1  tablet, Oral, Daily, Jose Urena MD, 1 tablet at 12/12/21 0855  •  warfarin (COUMADIN) tablet 4 mg, 4 mg, Oral, Once, Cindy Ball MD    Data Review:  All labs (24hrs):   Recent Results (from the past 24 hour(s))   Protime-INR    Collection Time: 12/12/21 10:48 AM    Specimen: Blood   Result Value Ref Range    Protime 13.0 (L) 19.4 - 28.5 Seconds    INR 1.19 (L) 2.00 - 3.00   Comprehensive Metabolic Panel    Collection Time: 12/12/21 10:48 AM    Specimen: Blood   Result Value Ref Range    Glucose 107 (H) 65 - 99 mg/dL    BUN 17 8 - 23 mg/dL    Creatinine 0.60 0.57 - 1.00 mg/dL    Sodium 136 136 - 145 mmol/L    Potassium 4.4 3.5 - 5.2 mmol/L    Chloride 99 98 - 107 mmol/L    CO2 25.0 22.0 - 29.0 mmol/L    Calcium 7.2 (L) 8.6 - 10.5 mg/dL    Total Protein 6.1 6.0 - 8.5 g/dL    Albumin 2.30 (L) 3.50 - 5.20 g/dL    ALT (SGPT) 14 1 - 33 U/L    AST (SGOT) 25 1 - 32 U/L    Alkaline Phosphatase 189 (H) 39 - 117 U/L    Total Bilirubin 0.2 0.0 - 1.2 mg/dL    eGFR Non African Amer 100 >60 mL/min/1.73    Globulin 3.8 gm/dL    A/G Ratio 0.6 g/dL    BUN/Creatinine Ratio 28.3 (H) 7.0 - 25.0    Anion Gap 12.0 5.0 - 15.0 mmol/L   CBC Auto Differential    Collection Time: 12/12/21 10:48 AM    Specimen: Blood   Result Value Ref Range    WBC 8.50 3.40 - 10.80 10*3/mm3    RBC 3.58 (L) 3.77 - 5.28 10*6/mm3    Hemoglobin 9.3 (L) 12.0 - 15.9 g/dL    Hematocrit 29.1 (L) 34.0 - 46.6 %    MCV 81.3 79.0 - 97.0 fL    MCH 26.1 (L) 26.6 - 33.0 pg    MCHC 32.1 31.5 - 35.7 g/dL    RDW 18.7 (H) 12.3 - 15.4 %    RDW-SD 53.4 37.0 - 54.0 fl    MPV 8.8 6.0 - 12.0 fL    Platelets 195 140 - 450 10*3/mm3    Neutrophil % 85.9 (H) 42.7 - 76.0 %    Lymphocyte % 4.4 (L) 19.6 - 45.3 %    Monocyte % 8.1 5.0 - 12.0 %    Eosinophil % 1.2 0.3 - 6.2 %    Basophil % 0.4 0.0 - 1.5 %    Neutrophils, Absolute 7.30 (H) 1.70 - 7.00 10*3/mm3    Lymphocytes, Absolute 0.40 (L) 0.70 - 3.10 10*3/mm3    Monocytes, Absolute 0.70 0.10 - 0.90 10*3/mm3     Eosinophils, Absolute 0.10 0.00 - 0.40 10*3/mm3    Basophils, Absolute 0.00 0.00 - 0.20 10*3/mm3    nRBC 0.0 0.0 - 0.2 /100 WBC   Magnesium    Collection Time: 12/12/21 10:48 AM    Specimen: Blood   Result Value Ref Range    Magnesium 1.6 1.6 - 2.4 mg/dL        Imaging:  XR Chest 1 View  Narrative: EXAMINATION: XR CHEST 1 VW-     DATE OF EXAM: 12/11/2021 5:08 PM     INDICATION: SOB; J18.9-Pneumonia, unspecified organism; R06.02-Shortness  of breath; R93.0-Abnormal findings on diagnostic imaging of skull and  head, not elsewhere classified; R79.1-Abnormal coagulation profile;  Z95.828-Presence of other vascular implants and grafts.     COMPARISON: Chest radiograph dated 12/08/2021     TECHNIQUE: Portable AP view of the chest was obtained.     FINDINGS:  The cardiomediastinal silhouette is within normal limits. Pulmonary  vascularity is unremarkable. There are patchy areas of airspace opacity  throughout both lungs likely representing multifocal pneumonia. There is  no pleural effusion or pneumothorax. There are degenerative changes of  the thoracic spine. There is no free air under the diaphragm.     Impression: 1. Patchy bilateral airspace opacities likely representing multifocal  pneumonia. No significant change from prior exam dated 12/08/2021.  2. Removal of the left-sided chest port.     Electronically Signed By-Cristian Pennington MD On:12/11/2021 5:49 PM  This report was finalized on [id number] by  Cristian Pennington MD.       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         Total Critical care time in direct medical management (   )  minutes  Alex Buchanan MD. D, ABSM.     2021  15:59 EST     Electronically signed by Alex Buchanan MD at 21 1601     Cindy Ball MD at 21 1301                Hematology/Oncology Inpatient Progress Note    PATIENT NAME: Tahira Zimmerman  : 1955  MRN: [id number]    CHIEF COMPLAINT: Shortness of breath    HISTORY OF PRESENT ILLNESS: Tahira Zimmerman is a 66 y.o. female who presented to [facility] on 2021 with complaints of shortness of breath for the past several days.  Shortness of breath is accompanied with productive cough with yellowish-white sputurm.  Denies fever, chills, diaphoresis, lightheadedness, altered mental status or confusion.  Patient reports being on oxygen at home at 6 to 8 LPM per nasal cannula.  She is currently receiving oral chemotherapy for Metastatic ovarian cancer with mets to brain, bone and various abdominal organs.       21  Hematology/Oncology was consulted for Ovarian cancer with mets to brain, bone and abdomen.  Patient is well known to our clinic and follows care with Dr.Rosaline Ball.  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  She was treated with surgery and then postoperatively with adjuvant chemotherapy consisting of Taxol and carboplatin.  Six months later, she had recurrence of disease intra-abdominally between the rectum and vagina.  She was treated with intraabdominal peritoneal chemotherapy and has been free of disease since that time until 2013 when she had recurrence.  She now has recurrent ovarian cancer with metastasis to brain, colon, abdominal wall and pulmonary involvement.  She was on Carboplatin and Doxil, Taxol, Carbo, Taxotere, single agent Gemzar, norpramin and then was on Doxil and Carboplatin.  Doxil was discontinued due to approaching lifetime dosing.  Patient had progression of disease on single agent  topotecan, Alimta and combination chemotherapy with Gemzar plus cisplatin.  CT abdomen and pelvis from 10/20/21 showed progression of metastatic disease within the chest, abdomen and pelvis.  CT head without contrast done 12/8/21 revealed intracranial calcifications which could reflect sequela to treated brain mets.  There is sclerotic area involving the clivus which could relate to known metastatic disease.  There are some sclerotic areas involving the calvarium near the convexity of the head.  Patient is currently on oral Letrozole with her course of brain radiation to the whole brain.       Chemotherapy history:    Doxorubicin Liposomal/Carboplatin: received 16 cycles: 2/15/2019 to 6/11/2020 : life time dose/progression of disease  Topotecan weekly: received 3 cycles : 6/2020 to 12/17/2020: progression of disease  Pemetrexed: Received 3 cycles: 12/23/20 to 3/10/2021: progression of disease  Cisplatin/Gemcitabine:  Received 2 cycles: 3/12/2021 to 5/7/2021- progression of disease  Denosumab (Xgeva) given every 28 days: Received 6 cycles out of 12 cycles- 2/1/21 to 7/16/21  Letrozole (Femara): 2.5mg by mouth daily: Started 9/22/2021: active treatment     She  has a past medical history of Anemia (2013), Cervical disc disorder (2013), Clotting disorder (HCC) (2013), Colon polyp (2013), Deep vein thrombosis (HCC) (2013), Diverticulosis (2013), GERD (gastroesophageal reflux disease) (2016), HL (hearing loss) (2016), Hyperlipidemia (2013), Hypertension, Joint pain (2013), Low back pain (2013), Neuromuscular disorder (HCC) (2015), Osteopenia (2014), Osteoporosis, Ovarian cancer (HCC), Ovarian cancer on left (HCC) (1995), Pneumonia (2010), Spinal stenosis (2013), and Urinary tract infection (2013).     PCP: Noemy Queen MD       I have reviewed and confirmed the accuracy of the patient's history: Chief complaint, HPI, ROS and Subjective as entered by the MA/LPN/RN. Cindyalexandrea Ball MD 12/12/21     12/10/2021:          History of present illness was reviewed and is unchanged from the previous visit. 12/10/21      Subjective      Patient has no new issues. Slowly improving.  Appetite has also improved    ROS:    Review of Systems   Constitutional: Positive for fatigue. Negative for activity change, appetite change, chills, diaphoresis, fever and unexpected weight change.   HENT: Negative for congestion, dental problem, ear discharge, ear pain, facial swelling, hearing loss, mouth sores, nosebleeds, sore throat, tinnitus, trouble swallowing and voice change.    Eyes: Negative for photophobia and visual disturbance.   Respiratory: Positive for cough and shortness of breath. Negative for choking, chest tightness and wheezing.    Cardiovascular: Negative for chest pain, palpitations and leg swelling.   Gastrointestinal: Negative for abdominal distention, abdominal pain, constipation, diarrhea, nausea and vomiting.   Endocrine: Negative.    Genitourinary: Negative for decreased urine volume, difficulty urinating, dysuria, flank pain, frequency, hematuria and urgency.   Musculoskeletal: Negative for back pain, gait problem, joint swelling, myalgias, neck pain and neck stiffness.   Skin: Negative for color change, rash and wound.   Neurological: Positive for weakness. Negative for dizziness, tremors, syncope, speech difficulty, numbness and headaches.   Hematological: Negative.    Psychiatric/Behavioral: Negative.         MEDICATIONS:      Scheduled Meds:  budesonide, 0.5 mg, Nebulization, BID - RT  calcium 500 mg vitamin D 5 mcg (200 UT), 1 tablet, Oral, BID  cefTRIAXone, 1 g, Intravenous, Q24H  cetirizine, 10 mg, Oral, Nightly  enoxaparin, 1 mg/kg, Subcutaneous, Q12H  erythromycin, , Right Eye, 4x Daily  famotidine, 40 mg, Oral, QAM AC  folic acid, 1,000 mcg, Oral, Daily  guaiFENesin, 1,200 mg, Oral, BID  ipratropium-albuterol, 3 mL, Nebulization, 4x Daily - RT  letrozole, 2.5 mg, Oral, Daily  magnesium oxide, 400 mg, Oral,  "TID  micafungin (MYCAMINE) IV, 150 mg, Intravenous, Q24H  montelukast, 10 mg, Oral, Nightly  phosphorus, 250 mg, Oral, TID  potassium chloride, 20 mEq, Oral, BID With Meals  pregabalin, 100 mg, Oral, TID  senna-docusate sodium, 2 tablet, Oral, BID  sertraline, 50 mg, Oral, Nightly  sodium chloride, 3 mL, Intravenous, Q12H  triamterene-hydrochlorothiazide, 1 tablet, Oral, Daily       Continuous Infusions:  Pharmacy to Dose enoxaparin (LOVENOX),   Pharmacy to dose warfarin,   sodium chloride, 75 mL/hr, Last Rate: 75 mL/hr (12/12/21 0017)       PRN Meds:  •  acetaminophen **OR** acetaminophen **OR** acetaminophen  •  albuterol sulfate HFA  •  senna-docusate sodium **AND** polyethylene glycol **AND** bisacodyl **AND** bisacodyl  •  ipratropium-albuterol  •  magnesium sulfate **OR** magnesium sulfate in D5W 1g/100mL (PREMIX) **OR** magnesium sulfate  •  melatonin  •  ondansetron **OR** ondansetron  •  oxyCODONE  •  Pharmacy to Dose enoxaparin (LOVENOX)  •  Pharmacy to dose warfarin  •  potassium chloride **OR** potassium chloride **OR** potassium chloride  •  sodium chloride  •  sodium chloride  •  temazepam     ALLERGIES:      Allergies   Allergen Reactions   • Morphine Nausea Only and Hives   • Penicillin V Potassium Rash   • Sulfa Antibiotics Rash   • Levaquin [Levofloxacin] Nausea Only and Dizziness     When taken with Coumadin   • Amoxicillin Rash       Objective    VITALS:   /66 (BP Location: Right arm)   Pulse 82   Temp 98.1 °F (36.7 °C) (Oral)   Resp 16   Ht 165.1 cm (65\")   Wt 70.4 kg (155 lb 4.8 oz)   LMP  (LMP Unknown)   SpO2 99%   BMI 25.84 kg/m²     PHYSICAL EXAM:     Physical Exam  Vitals and nursing note reviewed. Exam conducted with a chaperone present.   Constitutional:       General: She is not in acute distress.     Appearance: Normal appearance. She is obese. She is ill-appearing. She is not toxic-appearing or diaphoretic.   HENT:      Head: Normocephalic and atraumatic.      Right Ear: " Tympanic membrane, ear canal and external ear normal.      Left Ear: Tympanic membrane, ear canal and external ear normal.      Nose: Nose normal. No congestion or rhinorrhea.      Mouth/Throat:      Mouth: Mucous membranes are moist.      Pharynx: Oropharynx is clear.   Eyes:      General:         Right eye: Discharge present.         Left eye: No discharge.      Extraocular Movements: Extraocular movements intact.      Pupils: Pupils are equal, round, and reactive to light.      Comments: Improved- slight redness   Neck:      Vascular: No carotid bruit.   Cardiovascular:      Rate and Rhythm: Regular rhythm. Tachycardia present.      Pulses: Normal pulses.      Heart sounds: Normal heart sounds. No murmur heard.  No friction rub. No gallop.    Pulmonary:      Effort: Pulmonary effort is normal. No respiratory distress.      Breath sounds: No stridor. Wheezing and rhonchi present. No rales.   Chest:      Chest wall: No tenderness.   Abdominal:      General: Abdomen is flat. Bowel sounds are normal. There is no distension.      Palpations: Abdomen is soft. There is no mass.      Tenderness: There is no abdominal tenderness. There is no guarding or rebound.      Hernia: No hernia is present.   Genitourinary:     Comments: deferred  Musculoskeletal:         General: No swelling, tenderness or signs of injury. Normal range of motion.      Cervical back: Normal range of motion and neck supple. No rigidity or tenderness.   Lymphadenopathy:      Cervical: No cervical adenopathy.   Skin:     General: Skin is warm and dry.      Capillary Refill: Capillary refill takes less than 2 seconds.      Coloration: Skin is not jaundiced.      Findings: No erythema or rash.      Comments: S/p Left subclavian port removal- left chest incision   Neurological:      General: No focal deficit present.      Mental Status: She is alert and oriented to person, place, and time. Mental status is at baseline.      Cranial Nerves: No cranial  nerve deficit.      Sensory: No sensory deficit.      Motor: No weakness.      Coordination: Coordination normal.      Gait: Gait normal.      Deep Tendon Reflexes: Reflexes normal.   Psychiatric:         Mood and Affect: Mood normal.         Behavior: Behavior normal.         Thought Content: Thought content normal.         Judgment: Judgment normal.         I have reexamined the patient and the results are consistent with the previously documented exam. Cindy Ball MD     RECENT LABS:    Lab Results (last 24 hours)     Procedure Component Value Units Date/Time    Magnesium [[id number]]  (Normal) Collected: 12/12/21 1048    Specimen: Blood Updated: 12/12/21 1153     Magnesium 1.6 mg/dL      Comment: Result checked       Comprehensive Metabolic Panel [[id number]]  (Abnormal) Collected: 12/12/21 1048    Specimen: Blood Updated: 12/12/21 1150     Glucose 107 mg/dL      BUN 17 mg/dL      Creatinine 0.60 mg/dL      Sodium 136 mmol/L      Potassium 4.4 mmol/L      Chloride 99 mmol/L      CO2 25.0 mmol/L      Calcium 7.2 mg/dL      Total Protein 6.1 g/dL      Albumin 2.30 g/dL      ALT (SGPT) 14 U/L      AST (SGOT) 25 U/L      Alkaline Phosphatase 189 U/L      Total Bilirubin 0.2 mg/dL      eGFR Non African Amer 100 mL/min/1.73      Globulin 3.8 gm/dL      A/G Ratio 0.6 g/dL      BUN/Creatinine Ratio 28.3     Anion Gap 12.0 mmol/L     Narrative:      GFR Normal >60  Chronic Kidney Disease <60  Kidney Failure <15      Protime-INR [[id number]]  (Abnormal) Collected: 12/12/21 1048    Specimen: Blood Updated: 12/12/21 1137     Protime 13.0 Seconds      INR 1.19    CBC & Differential [[id number]]  (Abnormal) Collected: 12/12/21 1048    Specimen: Blood Updated: 12/12/21 1135    Narrative:      The following orders were created for panel order CBC & Differential.  Procedure                               Abnormality         Status                     ---------                               -----------         ------                      CBC Auto Differential[[id number]]        Abnormal            Final result                 Please view results for these tests on the individual orders.    CBC Auto Differential [[id number]]  (Abnormal) Collected: 12/12/21 1048    Specimen: Blood Updated: 12/12/21 1135     WBC 8.50 10*3/mm3      RBC 3.58 10*6/mm3      Hemoglobin 9.3 g/dL      Hematocrit 29.1 %      MCV 81.3 fL      MCH 26.1 pg      MCHC 32.1 g/dL      RDW 18.7 %      RDW-SD 53.4 fl      MPV 8.8 fL      Platelets 195 10*3/mm3      Neutrophil % 85.9 %      Lymphocyte % 4.4 %      Monocyte % 8.1 %      Eosinophil % 1.2 %      Basophil % 0.4 %      Neutrophils, Absolute 7.30 10*3/mm3      Lymphocytes, Absolute 0.40 10*3/mm3      Monocytes, Absolute 0.70 10*3/mm3      Eosinophils, Absolute 0.10 10*3/mm3      Basophils, Absolute 0.00 10*3/mm3      nRBC 0.0 /100 WBC     Wound Culture - Wound, Chest, Left [[id number]] Collected: 12/10/21 1148    Specimen: Wound from Chest, Left Updated: 12/12/21 1025     Wound Culture No growth at 2 days     Gram Stain Rare (1+) WBCs per low power field      No organisms seen    Blood Culture - Blood, Arm, Left [[id number]]  (Normal) Collected: 12/10/21 1414    Specimen: Blood from Arm, Left Updated: 12/11/21 1515     Blood Culture No growth at 24 hours          PENDING RESULTS:     IMAGING REVIEWED:  XR Chest 1 View    Result Date: 12/11/2021  1. Patchy bilateral airspace opacities likely representing multifocal pneumonia. No significant change from prior exam dated 12/08/2021. 2. Removal of the left-sided chest port.  Electronically Signed By-Cristian Pennington MD On:12/11/2021 5:49 PM This report was finalized on [id number] by  Cristian Pennington MD.      Assessment/Plan      ASSESSMENT:    Recurrent ovarian cancer with metastasis to brain, colon, abdominal wall and pulmonary involvement: She has had  Multiple lines of therapy in the past but most recently she is on Letrozol. Patient is currently on  Palliative  XRT to the whole  brain         Chemotherapy history:    Doxorubicin Liposomal/Carboplatin: received 16 cycles: 2/15/2019 to 6/11/2020 : life time dose/progression of disease  Topotecan weekly: received 3 cycles : 6/2020 to 12/17/2020: progression of disease  Pemetrexed: Received 3 cycles: 12/23/20 to 3/10/2021: progression of disease  Cisplatin/Gemcitabine:  Received 2 cycles: 3/12/2021 to 5/7/2021- progression of disease  Denosumab (Xgeva) given every 28 days: Received 6 cycles out of 12 cycles- 2/1/21 to 7/16/21  Letrozole (Femara): 2.5mg by mouth daily: Started 9/22/2021: active treatment     2. 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 Will continue the same. ID is following.  Patient does not want any invasive testing.  3. Coumadin toxicity: INR > 8.  Holding coumadin was given Vitamin K in ED. Monitoring INR and coumadin  Per pharmacy: INR remains subtherapeutic.  I have increased warfarin to 4 mg p.o. daily  4. Hypokalemia: replaced per primary team: on oral supplement  5. History of DVT: on anticoagulation with Coumadin- previously held due to coumadin toxicity  6. Pancytopenia: due to chemotherapy: on Procrit  7. Iron deficiency anemia: Hemoglobin 8.3, HCT 27.4, MCV 81.1, MCH 26.4  8. Hypomagnesemia: replaced per primary team  9. Right eye bacterial conjunctivitis: will order Erythromycin eye gtts.   10. Candidemia, PCR detected Candida albicans due to port: s/p port removal. Patient getting IV micafungin, IV rocephin     PLANS    1. S/P left subclavian port removal per general surgery  2. IV ABT rocephin , micafungin  3. Increase warfarin to 4 mg p.o. daily  4. Daily PT/INR  5. Blood transfusion: Administer 1 unit of PRBC's for hemoglobin <7.5  6. Monitor for bleeding  7. Continue Femara   8. Continue Erythromycin ophthalmic 0.5% gtts- give to right eye four times a day x7 days  9.  Continue supportive care  10.  Discussed  with patient       Electronically signed by Cindy Ball MD, 21, 1:04 PM EST.    Electronically signed by Cindy Ball MD, 21, 12:29 PM EST.          Electronically signed by Cindy Ball MD at 21 1304     CarlsonCarlee [email or web address] at 21 0830              [facility] Medicine Services Daily Progress Note    Patient Name: Tahira Zimmerman  : 1955  MRN: [id number]  Primary Care Physician:  Noemy Queen MD  Date of admission: 2021      Subjective      Chief Complaint: Shortness of breath      Patient Reports persistent shortness of breath but not worsening.  She denies any pain.  No fever or chills.    ROS full review of systems was performed pertinent positives and negatives noted below, positive for decreased appetite and fatigue.  Positive for shortness of breath.  Negative for fever chills.  Negative for chest pain.  Negative for lower extremity edema.      Objective      Vitals:   Temp:  [97.9 °F (36.6 °C)-98.1 °F (36.7 °C)] 98.1 °F (36.7 °C)  Heart Rate:  [66-86] 66  Resp:  [18-20] 18  BP: (123-152)/(69-87) 123/69  Flow (L/min):  [5] 5    Physical Exam     Constitutional:      Awake, alert, no acute distress  HENT:      Normocephalic and atraumatic. Nose normal. Mucous membranes are moist. Oropharynx is clear.   Eyes:      No icterus, EOM intact. Pupils are equal, round, and reactive to light.   Neck:     No LAD.  No JVD.  No thyromegaly  Cardiovascular:      Regular rate and regular rhythm.  No murmur.  No edema  Pulmonary:      Mild rhonchi bilaterally  Abdominal:      Soft, NTND.  BS + all 4 quadrants  Musculoskeletal:         No joint effusions.  No atrophy   Skin:     Warm, dry, no rashes.  No lesions.  Neurological:      Alert and oriented x3, no focal neurologic deficits could be appreciated  Psychiatric:         Normal mood and affect, normal thought process and judgment         Result Review    Result Review:  I have  personally reviewed the results from the time of this admission to 12/12/2021 08:30 EST and agree with these findings:  [x]  Laboratory  [x]  Microbiology  [x]  Radiology  []  EKG/Telemetry   []  Cardiology/Vascular   []  Pathology  []  Old records  []  Other:  Most notable findings include: A.m. labs pending, previous labs and chart reviewed    Wounds (last 24 hours)     LDA Wound     Row Name 12/11/21 1900             Wound 12/10/21 1137 Left upper chest Incision    Wound - Properties Group Placement Date: 12/10/21  -TT Placement Time: 1137  -TT Side: Left  -TT Orientation: upper  -TT Location: chest  -TT Primary Wound Type: Incision  -TT      Dressing Appearance dry; intact; no drainage  -SL      Closure Liquid skin adhesive; Approximated  -SL      Drainage Amount none  -SL      Retired Wound - Properties Group Date first assessed: 12/10/21  -TT Time first assessed: 1137  -TT Side: Left  -TT Location: chest  -TT Primary Wound Type: Incision  -TT            User Key  (r) = Recorded By, (t) = Taken By, (c) = Cosigned By    Initials Name Provider Type    TT Johnna Barnard RN Registered Nurse    Ladi Andres RN Registered Nurse                  Assessment/Plan      Brief Patient Summary:  Tahira Zimmerman is a 66 y.o. female with past medical history of metastatic ovarian CA, DVT, Coumadin toxicity, GERD, HLD, anemia, who presents to to the emergency with increasing shortness of breath and cough.  Imaging concerning for possible pneumonia and she was admitted and started on IV antibiotics.  She was also found to have Coumadin toxicity with INR greater than 8.  She was also found to have positive blood cultures with Candida.  She remains on IV antibiotics for possible pneumonia although concerns for malignant spread as etiology for symptoms and imaging also possible but patient does not want to undergo further diagnostic testing for this.  She remains on antifungal for her candidemia and her port has been  removed.  She is being followed by oncology, infectious disease, general surgery, pulmonology.  She remains short of breath and stable on 4 L nasal cannula.      budesonide, 0.5 mg, Nebulization, BID - RT  calcium 500 mg vitamin D 5 mcg (200 UT), 1 tablet, Oral, BID  cefTRIAXone, 1 g, Intravenous, Q24H  cetirizine, 10 mg, Oral, Nightly  enoxaparin, 1 mg/kg, Subcutaneous, Q12H  erythromycin, , Right Eye, 4x Daily  famotidine, 40 mg, Oral, QAM AC  folic acid, 1,000 mcg, Oral, Daily  guaiFENesin, 1,200 mg, Oral, BID  ipratropium-albuterol, 3 mL, Nebulization, 4x Daily - RT  letrozole, 2.5 mg, Oral, Daily  magnesium oxide, 400 mg, Oral, TID  micafungin (MYCAMINE) IV, 150 mg, Intravenous, Q24H  montelukast, 10 mg, Oral, Nightly  phosphorus, 250 mg, Oral, TID  potassium chloride, 20 mEq, Oral, BID With Meals  pregabalin, 100 mg, Oral, TID  senna-docusate sodium, 2 tablet, Oral, BID  sertraline, 50 mg, Oral, Nightly  sodium chloride, 3 mL, Intravenous, Q12H  triamterene-hydrochlorothiazide, 1 tablet, Oral, Daily       Pharmacy to Dose enoxaparin (LOVENOX),   Pharmacy to dose warfarin,   sodium chloride, 75 mL/hr, Last Rate: 75 mL/hr (12/12/21 0017)         Active Hospital Problems:  Active Hospital Problems    Diagnosis    • **Port-A-Cath in place    • Pneumonia due to infectious organism    • Coumadin toxicity    • Hypokalemia    • AMS (altered mental status)    • Bone metastases (HCC)    • Depression    • Brain metastases (HCC)    • Malignant neoplasm of right ovary (HCC)      Plan:     1.  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  Appreciate ID, oncology, pulmonology recommendations.  Patient reports she is not interested in aggressive diagnostic studies at this time.    2.  Candidemia with Candida albicans  -Port removed 12/10, culture from port tip  pending  -Continue antifungal per infectious disease.  Will need 2 weeks of treatment.  Discussed with case management home health arrangements    3.  Metastatic ovarian cancer  -Appreciate oncology assistance.  Prior to admission patient has been receiving palliative XRT to whole brain.  With concerns for lymphangitic carcinomatosis spread to lungs with hypoxia and shortness of breath patient may benefit from palliative care consult    4.  Coumadin toxicity  -Resolved, most recent INR 1.2, remains on treatment dose Lovenox, will discuss with hematology plans for resuming Coumadin versus long-term treatment dose Lovenox as an option    5.  Hypokalemia  -A.m. labs pending, replace per protocol    6.  Essential hypertension  -Blood pressure currently stable.  Continue home antihypertensives.    7.  Acute hypoxic respiratory failure  -Secondary to pneumonia/lymphangitic carcinomatosis, remained stable on 4 L nasal cannula, wean as tolerated    8.  Anemia of chronic disease  -A.m. labs pending, hemoglobin 8.6 yesterday, continue to monitor    DVT prophylaxis:  Medical DVT prophylaxis orders are present.    CODE STATUS:    Level Of Support Discussed With: Patient  Code Status (Patient has no pulse and is not breathing): No CPR (Do Not Attempt to Resuscitate)  Medical Interventions (Patient has pulse or is breathing): Full Support      Disposition:  I expect patient to be discharged next 1 to 2 days pending medical stability.  Will need home health for IV antifungals.    This patient has been examined wearing appropriate Personal Protective Equipment  12/12/21      Electronically signed by Carlee Carlson DO, 12/12/21, 08:30 EST.  [facility] Hospitalist Team               Electronically signed by Carlee Carlson DO at 12/12/21 0895     Cony Shelby MD at 12/11/21 1830          Infectious Diseases Progress Note      LOS: 3 days   Patient Care Team:  Noemy Queen MD as PCP - General (Family Medicine)  Cindy Ball  MD Angie as Consulting Physician (Hematology and Oncology)    Chief Complaint: Shortness of breath    Subjective       The patient has been afebrile for the last 24 hours.  The patient is currently on 5 L oxygen via nasal cannula.  The patient denied having any new complaints today.      Review of Systems:   Review of Systems   Constitutional: Positive for fatigue.   HENT: Negative.    Eyes: Negative.    Respiratory: Positive for shortness of breath.    Cardiovascular: Negative.    Gastrointestinal: Negative.    Endocrine: Negative.    Genitourinary: Negative.    Musculoskeletal: Negative.    Skin: Negative.    Neurological: Negative.    Psychiatric/Behavioral: Negative.    All other systems reviewed and are negative.       Objective     Vital Signs  Temp:  [97.8 °F (36.6 °C)-98.1 °F (36.7 °C)] 98.1 °F (36.7 °C)  Heart Rate:  [78-92] 80  Resp:  [18-20] 18  BP: (117-134)/(75-81) 134/81    Physical Exam:  Physical Exam  Vitals and nursing note reviewed.   Constitutional:       General: She is not in acute distress.     Appearance: Normal appearance. She is well-developed and normal weight. She is not diaphoretic.   HENT:      Head: Normocephalic and atraumatic.   Eyes:      General: No scleral icterus.     Extraocular Movements: Extraocular movements intact.      Conjunctiva/sclera: Conjunctivae normal.      Pupils: Pupils are equal, round, and reactive to light.   Cardiovascular:      Rate and Rhythm: Normal rate and regular rhythm.      Heart sounds: Normal heart sounds, S1 normal and S2 normal. No murmur heard.      Pulmonary:      Effort: Pulmonary effort is normal. No respiratory distress.      Breath sounds: No stridor. Rales present. No wheezing.   Chest:      Chest wall: No tenderness.   Abdominal:      General: Bowel sounds are normal. There is no distension.      Palpations: Abdomen is soft. There is no mass.      Tenderness: There is no abdominal tenderness. There is no guarding.   Musculoskeletal:          General: No swelling, tenderness or deformity. Normal range of motion.      Cervical back: Neck supple.   Skin:     General: Skin is warm and dry.      Coloration: Skin is not pale.      Findings: No bruising, erythema or rash.      Comments: Multiple skin nodules noticed on the head and 1 large nodule in the left anterior chest     Port has been removed   Neurological:      General: No focal deficit present.      Mental Status: She is alert and oriented to person, place, and time. Mental status is at baseline.      Cranial Nerves: No cranial nerve deficit.   Psychiatric:         Mood and Affect: Mood normal.          Results Review:    I have reviewed all clinical data, test, lab, and imaging results.     Radiology  XR Chest 1 View    Result Date: 12/11/2021  EXAMINATION: XR CHEST 1 VW-  DATE OF EXAM: 12/11/2021 5:08 PM  INDICATION: SOB; J18.9-Pneumonia, unspecified organism; R06.02-Shortness of breath; R93.0-Abnormal findings on diagnostic imaging of skull and head, not elsewhere classified; R79.1-Abnormal coagulation profile; Z95.828-Presence of other vascular implants and grafts.  COMPARISON: Chest radiograph dated 12/08/2021  TECHNIQUE: Portable AP view of the chest was obtained.  FINDINGS: The cardiomediastinal silhouette is within normal limits. Pulmonary vascularity is unremarkable. There are patchy areas of airspace opacity throughout both lungs likely representing multifocal pneumonia. There is no pleural effusion or pneumothorax. There are degenerative changes of the thoracic spine. There is no free air under the diaphragm.      1. Patchy bilateral airspace opacities likely representing multifocal pneumonia. No significant change from prior exam dated 12/08/2021. 2. Removal of the left-sided chest port.  Electronically Signed By-Cristian Pennington MD On:12/11/2021 5:49 PM This report was finalized on [id number] by  Cristian Pennington MD.      Cardiology    Laboratory    Results from last 7 days   Lab  Units 12/11/21  0552 12/10/21  0537 12/09/21  0529 12/08/21  1910   WBC 10*3/mm3 7.60 7.40 5.90 4.20   HEMOGLOBIN g/dL 8.6* 8.3* 8.8* 9.6*   HEMATOCRIT % 26.6* 25.6* 27.4* 29.6*   PLATELETS 10*3/mm3 183 203 232 222     Results from last 7 days   Lab Units 12/11/21  0552 12/10/21  0537 12/09/21  0529 12/08/21  1910   SODIUM mmol/L 136 136 134* 134*   POTASSIUM mmol/L 4.4 3.9 4.1 3.3*   CHLORIDE mmol/L 101 101 99 97*   CO2 mmol/L 25.0 25.0 26.0 25.0   BUN mg/dL 21 24* 29* 31*   CREATININE mg/dL 0.71 0.73 0.95 1.01*   GLUCOSE mg/dL 104* 86 120* 155*   ALBUMIN g/dL  --  2.40*  --  2.60*   BILIRUBIN mg/dL  --  0.2  --  0.3   ALK PHOS U/L  --  181*  --  209*   AST (SGOT) U/L  --  26  --  22   ALT (SGPT) U/L  --  13  --  10   CALCIUM mg/dL 6.7* 6.7* 6.8* 7.0*                 Microbiology   Microbiology Results (last 10 days)     Procedure Component Value - Date/Time    Blood Culture - Blood, Arm, Left [[id number]]  (Normal) Collected: 12/10/21 1414    Lab Status: Preliminary result Specimen: Blood from Arm, Left Updated: 12/11/21 1515     Blood Culture No growth at 24 hours    Wound Culture - Wound, Chest, Left [[id number]] Collected: 12/10/21 1148    Lab Status: Preliminary result Specimen: Wound from Chest, Left Updated: 12/11/21 1127     Wound Culture No growth     Gram Stain Rare (1+) WBCs per low power field      No organisms seen    Blood Culture - Blood, Chest, Left [[id number]]  (Abnormal) Collected: 12/08/21 1946    Lab Status: Final result Specimen: Blood from Chest, Left Updated: 12/11/21 0648     Blood Culture Candida albicans     Comment: Infectious disease consultation is highly recommended to rule out distant foci of infection.        Isolated from Aerobic Bottle     Gram Stain Aerobic Bottle Yeast    Narrative:      Refer to previous blood culture collected on 12/8/2021 1910 for MICs    Blood Culture - Blood, Chest, Left [[id number]]  (Abnormal)  (Susceptibility) Collected: 12/08/21 1910    Lab Status: Final  result Specimen: Blood from Chest, Left Updated: 12/11/21 0648     Blood Culture Candida albicans     Comment: Infectious disease consultation is highly recommended to rule out distant foci of infection.        Isolated from Aerobic and Anaerobic Bottles     Gram Stain Aerobic Bottle Yeast      Anaerobic Bottle Yeast    Susceptibility      Candida albicans     OSORIO     Fluconazole Susceptible     Micafungin Susceptible                         Respiratory Panel PCR w/COVID-19(SARS-CoV-2) JAMAL/ROLF/ABENA/PAD/COR/MAD/MICHAEL In-House, NP Swab in UTM/VTM, 3-4 HR TAT - Swab, Nasopharynx [[id number]]  (Normal) Collected: 12/08/21 1910    Lab Status: Final result Specimen: Swab from Nasopharynx Updated: 12/08/21 2029     ADENOVIRUS, PCR Not Detected     Coronavirus 229E Not Detected     Coronavirus HKU1 Not Detected     Coronavirus NL63 Not Detected     Coronavirus OC43 Not Detected     COVID19 Not Detected     Human Metapneumovirus Not Detected     Human Rhinovirus/Enterovirus Not Detected     Influenza A PCR Not Detected     Influenza B PCR Not Detected     Parainfluenza Virus 1 Not Detected     Parainfluenza Virus 2 Not Detected     Parainfluenza Virus 3 Not Detected     Parainfluenza Virus 4 Not Detected     RSV, PCR Not Detected     Bordetella pertussis pcr Not Detected     Bordetella parapertussis PCR Not Detected     Chlamydophila pneumoniae PCR Not Detected     Mycoplasma pneumo by PCR Not Detected    Narrative:      In the setting of a positive respiratory panel with a viral infection PLUS a negative procalcitonin without other underlying concern for bacterial infection, consider observing off antibiotics or discontinuation of antibiotics and continue supportive care. If the respiratory panel is positive for atypical bacterial infection (Bordetella pertussis, Chlamydophila pneumoniae, or Mycoplasma pneumoniae), consider antibiotic de-escalation to target atypical bacterial infection.    Blood Culture ID, PCR - Blood, Chest,  Left [[id number]]  (Abnormal) Collected: 12/08/21 1910    Lab Status: Final result Specimen: Blood from Chest, Left Updated: 12/09/21 1422     BCID, PCR Candida albicans. Identification by BCID2 PCR     BOTTLE TYPE Aerobic Bottle    Narrative:      Infectious disease consultation is highly recommended to rule out distant foci of infection.          Medication Review:       Schedule Meds  budesonide, 0.5 mg, Nebulization, BID - RT  calcium 500 mg vitamin D 5 mcg (200 UT), 1 tablet, Oral, BID  cefTRIAXone, 1 g, Intravenous, Q24H  cetirizine, 10 mg, Oral, Nightly  enoxaparin, 1 mg/kg, Subcutaneous, Q12H  erythromycin, , Right Eye, 4x Daily  famotidine, 40 mg, Oral, QAM AC  folic acid, 1,000 mcg, Oral, Daily  guaiFENesin, 1,200 mg, Oral, BID  ipratropium-albuterol, 3 mL, Nebulization, 4x Daily - RT  letrozole, 2.5 mg, Oral, Daily  magnesium oxide, 400 mg, Oral, TID  micafungin (MYCAMINE) IV, 150 mg, Intravenous, Q24H  montelukast, 10 mg, Oral, Nightly  phosphorus, 250 mg, Oral, TID  potassium chloride, 20 mEq, Oral, BID With Meals  pregabalin, 100 mg, Oral, TID  senna-docusate sodium, 2 tablet, Oral, BID  sertraline, 50 mg, Oral, Nightly  sodium chloride, 3 mL, Intravenous, Q12H  triamterene-hydrochlorothiazide, 1 tablet, Oral, Daily        Infusion Meds  Pharmacy to Dose enoxaparin (LOVENOX),   Pharmacy to dose warfarin,   sodium chloride, 75 mL/hr, Last Rate: 75 mL/hr (12/10/21 0915)        PRN Meds  •  acetaminophen **OR** acetaminophen **OR** acetaminophen  •  albuterol sulfate HFA  •  senna-docusate sodium **AND** polyethylene glycol **AND** bisacodyl **AND** bisacodyl  •  ipratropium-albuterol  •  magnesium sulfate **OR** magnesium sulfate in D5W 1g/100mL (PREMIX) **OR** magnesium sulfate  •  melatonin  •  ondansetron **OR** ondansetron  •  oxyCODONE  •  Pharmacy to Dose enoxaparin (LOVENOX)  •  Pharmacy to dose warfarin  •  potassium chloride **OR** potassium chloride **OR** potassium chloride  •  sodium  chloride  •  sodium chloride  •  temazepam        Assessment/Plan       Antimicrobial Therapy   1.  Micafungin        2.  Rocephin        3.        4.        5.            Assessment     Candidemia with Candida albicans.  Most likely secondary to port infection  -Port was removed on 12/10/2021-tip culture is pending     Dyspnea and hypoxia.  Chest x-ray showed interstitial lung infiltrates.  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  Patient also have multiple skin nodules concerning for metastasis.  Patient was on oral chemotherapy prior to admission     The patient is s/p port placement in the past     Plan     Continue IV micafungin 150 mg daily patient will need 2 weeks of treatment  Continue IV Rocephin for now  Pulmonary service was consulted to evaluate patient for possible bronchoscopy  Continue supportive care  A.m. labs  Long-term prognosis is poor secondary to advanced and metastatic ovarian cancer       Cony Shelby MD  21  18:30 EST    Note is dictated utilizing voice recognition software/Dragon    Electronically signed by Cony Shelby MD at 21 1831     Jaky Pettit MD at 21 1645              [facility] Medicine Services Daily Progress Note    Patient Name: Tahira Zimmerman  : 1955  MRN: [id number]  Primary Care Physician:  Noemy Queen MD  Date of admission: 2021      Subjective      Chief Complaint: Cough with shortness of breath and pneumonia.    12/10/2021  Overall the patient is feeling better.  No new issues.  Did have an uneventful trip to the operating room for removal of her  port.    12/11/2021  Today the patient reports feeling somewhat better.  The patient tolerated the removal of the port well.  The patient is currently on a Lovenox bridge while she gets reanticoagulated.    Review of Systems   Constitutional: Positive for decreased appetite, malaise/fatigue and weight loss.   HENT: Negative.    Eyes: Negative.    Cardiovascular: Negative.    Respiratory: Positive for cough and sputum production.         She reports her breathing is somewhat improved.   Endocrine: Negative.    Hematologic/Lymphatic: Negative.    Skin: Negative.    Musculoskeletal: Positive for muscle weakness.   Gastrointestinal: Positive for dysphagia.   Genitourinary: Negative.    Neurological: Negative.    Psychiatric/Behavioral: Negative.    Allergic/Immunologic: Negative.    All other systems reviewed and are negative.         Objective      Vitals:   Temp:  [97.8 °F (36.6 °C)-98.1 °F (36.7 °C)] 98.1 °F (36.7 °C)  Heart Rate:  [78-92] 80  Resp:  [18-20] 18  BP: (117-134)/(75-81) 134/81  Flow (L/min):  [5] 5    Physical Exam  Vitals and nursing note reviewed.   Constitutional:       General: She is not in acute distress.     Appearance: Normal appearance. She is well-developed. She is not ill-appearing, toxic-appearing or diaphoretic.      Comments: The patient is very ill in her appearance. She continues to have some issues with eating but overall she seems to be improving.   HENT:      Head: Normocephalic and atraumatic.      Right Ear: Ear canal and external ear normal.      Left Ear: Ear canal and external ear normal.      Nose: Nose normal. No congestion or rhinorrhea.      Mouth/Throat:      Mouth: Mucous membranes are moist.      Pharynx: No oropharyngeal exudate.   Eyes:      General: No scleral icterus.        Right eye: No discharge.         Left eye: No discharge.      Extraocular Movements: Extraocular movements intact.      Conjunctiva/sclera: Conjunctivae normal.      Pupils: Pupils are equal,  round, and reactive to light.   Neck:      Thyroid: No thyromegaly.      Vascular: No carotid bruit or JVD.      Trachea: No tracheal deviation.   Cardiovascular:      Rate and Rhythm: Normal rate and regular rhythm.      Pulses: Normal pulses.      Heart sounds: Normal heart sounds. No murmur heard.  No friction rub. No gallop.    Pulmonary:      Effort: Pulmonary effort is normal. No respiratory distress.      Breath sounds: No stridor. No wheezing, rhonchi or rales.      Comments: Few scattered rhonchi which improve with coughing.  Chest:      Chest wall: No tenderness.   Abdominal:      General: Bowel sounds are normal. There is no distension.      Palpations: Abdomen is soft. There is no mass.      Tenderness: There is no abdominal tenderness. There is no guarding or rebound.      Hernia: No hernia is present.   Musculoskeletal:         General: No swelling, tenderness, deformity or signs of injury. Normal range of motion.      Cervical back: Normal range of motion and neck supple. No rigidity. No muscular tenderness.      Right lower leg: No edema.      Left lower leg: No edema.   Lymphadenopathy:      Cervical: No cervical adenopathy.   Skin:     General: Skin is warm and dry.      Coloration: Skin is not jaundiced or pale.      Findings: No bruising, erythema or rash.   Neurological:      General: No focal deficit present.      Mental Status: She is alert and oriented to person, place, and time. Mental status is at baseline.      Cranial Nerves: No cranial nerve deficit.      Sensory: No sensory deficit.      Motor: No weakness or abnormal muscle tone.      Coordination: Coordination normal.   Psychiatric:         Mood and Affect: Mood normal.         Behavior: Behavior normal.         Thought Content: Thought content normal.         Judgment: Judgment normal.             Result Review    Result Review:  I have personally reviewed the results from the time of this admission to 12/11/2021 16:45 EST and agree  with these findings:  [x]  Laboratory  [x]  Microbiology  [x]  Radiology  []  EKG/Telemetry   []  Cardiology/Vascular   []  Pathology  []  Old records  [x]  Other:  Most notable findings include: Speech therapy report    Wounds (last 24 hours)     LDA Wound     Row Name 12/11/21 0730 12/11/21 0355 12/11/21 0151       Wound 12/10/21 1137 Left upper chest Incision    Wound - Properties Group Placement Date: 12/10/21  -TT Placement Time: 1137  -TT Side: Left  -TT Orientation: upper  -TT Location: chest  -TT Primary Wound Type: Incision  -TT    Closure Liquid skin adhesive; Approximated  -RP Liquid skin adhesive; Approximated  -LS Liquid skin adhesive; Approximated  -LS    Drainage Amount none  -RP none  -LS none  -LS    Retired Wound - Properties Group Date first assessed: 12/10/21  -TT Time first assessed: 1137  -TT Side: Left  -TT Location: chest  -TT Primary Wound Type: Incision  -TT    Row Name 12/10/21 1901             Wound 12/10/21 1137 Left upper chest Incision    Wound - Properties Group Placement Date: 12/10/21  -TT Placement Time: 1137  -TT Side: Left  -TT Orientation: upper  -TT Location: chest  -TT Primary Wound Type: Incision  -TT      Dressing Appearance dry; intact; no drainage  -BD      Closure Liquid skin adhesive; Approximated  -BD      Drainage Amount none  -BD      Retired Wound - Properties Group Date first assessed: 12/10/21  -TT Time first assessed: 1137  -TT Side: Left  -TT Location: chest  -TT Primary Wound Type: Incision  -TT            User Key  (r) = Recorded By, (t) = Taken By, (c) = Cosigned By    Initials Name Provider Type    TT Johnna Barnard RN Registered Nurse    Joanie Bailey RN Registered Nurse    Chelsea Hernandez RN Registered Nurse    Regla Kasper RN Registered Nurse                  Assessment/Plan      Brief Patient Summary:  Tahira Zimmerman is a 66 y.o. female who has numerous medical problems which include metastatic ovarian cancer which is widely  metastatic, hypertension, coagulopathy related to Coumadin dosing with INR of eight, history of DVTs. The patient presented for increasing issues with swallowing.      budesonide, 0.5 mg, Nebulization, BID - RT  calcium 500 mg vitamin D 5 mcg (200 UT), 1 tablet, Oral, BID  cefTRIAXone, 1 g, Intravenous, Q24H  cetirizine, 10 mg, Oral, Nightly  enoxaparin, 1 mg/kg, Subcutaneous, Q12H  erythromycin, , Right Eye, 4x Daily  famotidine, 40 mg, Oral, QAM AC  folic acid, 1,000 mcg, Oral, Daily  guaiFENesin, 1,200 mg, Oral, BID  ipratropium-albuterol, 3 mL, Nebulization, 4x Daily - RT  letrozole, 2.5 mg, Oral, Daily  magnesium oxide, 400 mg, Oral, TID  micafungin (MYCAMINE) IV, 150 mg, Intravenous, Q24H  montelukast, 10 mg, Oral, Nightly  phosphorus, 250 mg, Oral, TID  potassium chloride, 20 mEq, Oral, BID With Meals  pregabalin, 100 mg, Oral, TID  senna-docusate sodium, 2 tablet, Oral, BID  sertraline, 50 mg, Oral, Nightly  sodium chloride, 3 mL, Intravenous, Q12H  triamterene-hydrochlorothiazide, 1 tablet, Oral, Daily  warfarin, 3 mg, Oral, Once       Pharmacy to Dose enoxaparin (LOVENOX),   Pharmacy to dose warfarin,   sodium chloride, 75 mL/hr, Last Rate: 75 mL/hr (12/10/21 0915)         Active Hospital Problems:  Active Hospital Problems    Diagnosis    • **Port-A-Cath in place    • Pneumonia due to infectious organism    • Coumadin toxicity    • Hypokalemia    • AMS (altered mental status)    • Bone metastases (HCC)    • Depression    • Brain metastases (HCC)    • Malignant neoplasm of right ovary (HCC)      Plan:   Pneumonia due to infectious organism, COVID-19 negative, chest x-ray per radiology shows bilateral pulmonary infiltrates, WBC not elevated, continue IV ceftriaxone and IV azithromycin until blood cultures resulted, DuoNebs every 4 hours as needed and albuterol every 6 hours as needed, stable on home 6 L oxygen via nasal cannula. Will follow closely to see if she improves on the ceftriaxone. Discontinue the  a azithromycin as her viral panel was negative. She is allergic to penicillins therefore will not change her to something like Zosyn which might help if this were aspiration. We will follow her closely and if need be start Cleocin.  Pulmonary medicine is considering bronchoscopy if she does not start to show improvement.     Coumadin toxicity INR greater than 8 with past medical history of DVT, hold home Coumadin for now given vitamin K in ED, INR daily pharmacy to dose warfarin to INR. Her INR is down to 1.24  this morning. We will continue to follow and restart with pharmacy's help.  She is currently on a Lovenox bridge.     Altered mental status?  Patient appears awake and alert and answers appropriately, she denies confusion, CT head negative for acute changes, mets to brain known     Hypokalemia mild potassium 3.3, potassium protocol in place repeat BNP in a.m. magnesium in a.m.     Ovarian  cancer with mets to bone brain and abdominal, followed by  of oncology, consulted Home meds unverified at this time reorder pending verification pharmacy       -I did speak with the patient about whether or not she was interested in having a lung biopsy to see if the lesions and disease in her lung is related to her cancer and that has metastasized.  She actually at this point is really more concerned about quality of life than quantity and understands that she has serious disease and told me that she was really not interested in pursuing aggressive/invasive procedures to further delineate the extent of her disease.    Hypertension, controlled on home meds unverified at this time reorder pending verification from pharmacy monitor BP     Anxiety depression, home meds unverified at this time reorder pending verification from pharmacy     Poor appetite, weight loss likely secondary to malignancy, boost 3 times daily ordered, dietary consulted     The patient continues on IV micafungin and will need this for total of 14  days intravenously.  Likely need case management to assist in setting the patient up with a way to receive this that meets her needs.    DVT prophylaxis:  Medical DVT prophylaxis orders are present.    CODE STATUS:    Level Of Support Discussed With: Patient  Code Status (Patient has no pulse and is not breathing): No CPR (Do Not Attempt to Resuscitate)  Medical Interventions (Patient has pulse or is breathing): Full Support      Disposition:  I expect patient to be discharged in 2 to 3 days if she improves.    This patient has been examined wearing appropriate Personal Protective Equipment and discussed with hospital infection control department. 12/11/21      Electronically signed by Jaky Pettit MD, 12/11/21, 16:45 EST.  [facility] Hospitalist Team             Electronically signed by Jaky Pettit MD at 12/11/21 1651          Physical Therapy Notes (last 72 hours)      Lillian Villalta, PT at 12/11/21 1141  Version 1 of 1         Problem: Adult Inpatient Plan of Care  Goal: Plan of Care Review  Outcome: Ongoing, Progressing  Flowsheets  Taken 12/11/2021 1139  Progress: improving  Plan of Care Reviewed With: patient  Outcome Summary: Pt is 67 yo female admitted for SOA, PNA, coumadin toxicity, candidemia, ?infected port.  Pt s/p removal of port on 12/10/2021.  Pt has ovarian cancer with mets to brain, bone, abdominal organs.  Pt presnets with global weakness, poor endurance.  Pt requiring Luca-modA for transfers.  Pt initially ambulated to bathroom with HHA-modA and poor balance observed.  Pt then ambulated using RW with improved balance, however pt continuing to require Luca due to weakness.  Pt with SOA and fatigue noted after mobilizing to/from bathroom with O2 measuring 90% after seated rest break.  Unable to ambulate longer distance due to weakness.  Pt far from mobility baseline of ambulating indep and negotiating 14 steps to bedroom.  Pt will need extensive IP rehab to address deficits.  PT will  follow 3x/week while at [facility].         Electronically signed by Lillian Villalta, PT at 21 1141     Lillian Villalta, PT at 21 1142  Version 1 of 1         Patient Name: Tahira Zimmerman  : 1955    MRN: [id number]                              Today's Date: 2021       Admit Date: 2021    Visit Dx:     ICD-10-CM ICD-9-CM   1. Pneumonia due to infectious organism, unspecified laterality, unspecified part of lung  J18.9 486   2. Shortness of breath  R06.02 786.05   3. Abnormal head CT  R93.0 793.0   4. Elevated INR  R79.1 790.92   5. Port-A-Cath in place  Z95.828 V45.89     Patient Active Problem List   Diagnosis   • Malignant neoplasm of right ovary (HCC)   • Hypomagnesemia   • Left upper extremity deep vein thrombosis (HCC)   • Pernicious anemia   • Malabsorption due to intolerance, not elsewhere classified   • MELANI (iron deficiency anemia)   • Pancytopenia due to antineoplastic chemotherapy (HCC)   • Encounter for antineoplastic chemotherapy   • Long term (current) use of anticoagulants   • Monitoring for long-term anticoagulant use   • Leg pain, bilateral   • Osteoarthritis of cervical spine without myelopathy   • Other long term (current) drug therapy   • Pain of metastatic malignancy   • Hyperlipidemia   • Essential hypertension   • Antineoplastic chemotherapy induced anemia   • Cervical disc disorder with radiculopathy   • Restless legs syndrome   • Dysuria   • Fibromyositis   • Port-A-Cath in place   • Encounter for medication management   • Chronic headaches   • Welcome to Medicare preventive visit   • Encounter for screening mammogram for breast cancer   • Postmenopausal   • Screening for diabetes mellitus   • Screening, ischemic heart disease   • Encounter for hepatitis C screening test for low risk patient   • Need for immunization against influenza   • Coumadin toxicity   • Anxiety   • Brain metastases (HCC)   • Deep vein thrombosis (HCC)   • Family history of  cerebrovascular accident (CVA)   • Family history of diabetes mellitus   • Insomnia   • Lung nodule   • Mass of skin   • Osteoporosis   • Shingles   • History of DVT (deep vein thrombosis)   • Depression   • Anemia   • Mastoiditis of left side   • Encounter for care related to vascular access port   • Iron deficiency anemia   • Malabsorption of iron   • Neck pain   • Bone metastases (HCC)   • Pneumonia of right lower lobe due to infectious organism   • Nasal congestion with rhinorrhea   • Cough   • Chest pain   • Elevated international normalized ratio (INR) due to prior anticoagulant medication ingestion   • Mucus plugging of bronchi   • Encephalopathy acute   • Hypotension   • Pneumonia due to COVID-19 virus   • Cytokine release syndrome, grade 2   • Pneumonia due to infectious organism   • Coumadin toxicity   • Hypokalemia   • AMS (altered mental status)     Past Medical History:   Diagnosis Date   • Anemia 2013   • Cervical disc disorder 2013    Herniated disc, pinched nerve   • Clotting disorder (HCC) 2013    Low platelets from chemo   • Colon polyp 2013   • Deep vein thrombosis (HCC) 2013   • Diverticulosis 2013   • GERD (gastroesophageal reflux disease) 2016   • HL (hearing loss) 2016    I need hearing aids   • Hyperlipidemia 2013    Need my cholesterol rechecked   • Hypertension    • Joint pain 2013    Shoulder pain, torn rotator cuff   • Low back pain 2013   • Neuromuscular disorder (HCC) 2015    Shingles, pinched nerves in my neck   • Osteopenia 2014   • Osteoporosis    • Ovarian cancer (HCC)     ovarian--  spread to lungs 10/2020   • Ovarian cancer on left (HCC) 1995   • Pneumonia 2010    Have had it several times   • Spinal stenosis 2013    Cervical   • Urinary tract infection 2013    Have had several utis     Past Surgical History:   Procedure Laterality Date   • BRONCHOSCOPY N/A 2/10/2021    Procedure: BRONCHOSCOPY with bronchioalveolar lavage;  Surgeon: Jerica Esparza MD;  Location: [facility] ENDOSCOPY;   Service: Pulmonary;  Laterality: N/A;  post op:right lobe pneumonia   • BRONCHOSCOPY N/A 6/15/2021    Procedure: BRONCHOSCOPY with bronchoalveolar lavage;  Surgeon: Jerica Esparza MD;  Location: [facility] ENDOSCOPY;  Service: Pulmonary;  Laterality: N/A;  lung cancer;pneumonia   • CATARACT EXTRACTION     • COLON SURGERY     • COLONOSCOPY  05/26/2018   • EXPLORATORY LAPAROTOMY     • HERNIA REPAIR     • HYSTERECTOMY     • OOPHORECTOMY        General Information     Row Name 12/11/21 1134          Physical Therapy Time and Intention    Document Type evaluation  -HC     Mode of Treatment physical therapy  -HC     Row Name 12/11/21 1134          General Information    Patient Profile Reviewed yes  -HC     Prior Level of Function independent:  pt reports ambulating with cane, negotiating 14 steps to bedroom, does not drive, 5L O2  -HC     Existing Precautions/Restrictions fall; oxygen therapy device and L/min  -HC     Barriers to Rehab medically complex  -HC     Row Name 12/11/21 1134          Living Environment    Lives With child(quinn), adult  resides with daughter  -HC     Row Name 12/11/21 1134          Home Main Entrance    Number of Stairs, Main Entrance none  -HC     Row Name 12/11/21 1134          Stairs Within Home, Primary    Stairs, Within Home, Primary 14 to bedroom  -HC     Number of Stairs, Within Home, Primary other (see comments)  -HC     Row Name 12/11/21 1134          Cognition    Orientation Status (Cognition) oriented x 3  -HC     Row Name 12/11/21 1134          Safety Issues, Functional Mobility    Safety Issues Affecting Function (Mobility) insight into deficits/self-awareness; safety precaution awareness  -HC     Impairments Affecting Function (Mobility) balance; postural/trunk control; endurance/activity tolerance; shortness of breath; strength; pain  -HC           User Key  (r) = Recorded By, (t) = Taken By, (c) = Cosigned By    Initials Name Provider Type    HC Lillian Villalta, PT Physical Therapist                Mobility     Row Name 12/11/21 1135          Bed Mobility    Bed Mobility supine-sit  -HC     Supine-Sit [place] (Bed Mobility) moderate assist (50% patient effort)  -     Row Name 12/11/21 1135          Bed-Chair Transfer    Bed-Chair [place] (Transfers) moderate assist (50% patient effort)  -HC     Row Name 12/11/21 1135          Sit-Stand Transfer    Sit-Stand [place] (Transfers) moderate assist (50% patient effort)  -     Row Name 12/11/21 1135          Gait/Stairs (Locomotion)    [place] Level (Gait) moderate assist (50% patient effort)  -     Assistive Device (Gait) walker, front-wheeled  -     Distance in Feet (Gait) 10 ft, 15 ft  -HC     Deviations/Abnormal Patterns (Gait) gait speed decreased  -     Comment (Gait/Stairs) high risk for falls, impaired balance  -           User Key  (r) = Recorded By, (t) = Taken By, (c) = Cosigned By    Initials Name Provider Type     Lillian Villalta, PT Physical Therapist               Obj/Interventions     Row Name 12/11/21 1136          Range of Motion Comprehensive    General Range of Motion no range of motion deficits identified  -[facility] Name 12/11/21 1136          Strength Comprehensive (MMT)    Comment, General Manual Muscle Testing (MMT) Assessment BUEs 4-/5, BLEs 4-/5  -[facility] Name 12/11/21 1136          Balance    Balance Assessment sitting static balance; sitting dynamic balance; standing static balance; standing dynamic balance  -     Static Sitting Balance WFL  -     Dynamic Sitting Balance mild impairment  -     Static Standing Balance moderate impairment  -     Dynamic Standing Balance moderate impairment; severe impairment  -           User Key  (r) = Recorded By, (t) = Taken By, (c) = Cosigned By    Initials Name Provider Type     Lillian Villalta, PT Physical Therapist               Goals/Plan     Row Name 12/11/21 1138          Bed Mobility Goal 1 (PT)    Activity/Assistive Device (Bed  Mobility Goal 1, PT) bed mobility activities, all  -HC     [place] Level/Cues Needed (Bed Mobility Goal 1, PT) standby assist  -HC     Time Frame (Bed Mobility Goal 1, PT) 2 weeks  -HC     Row Name 12/11/21 1138          Transfer Goal 1 (PT)    Activity/Assistive Device (Transfer Goal 1, PT) transfers, all  -HC     [place] Level/Cues Needed (Transfer Goal 1, PT) standby assist  -HC     Time Frame (Transfer Goal 1, PT) 2 weeks  -HC     Row Name 12/11/21 1138          Gait Training Goal 1 (PT)    Activity/Assistive Device (Gait Training Goal 1, PT) gait (walking locomotion); assistive device use  -HC     [place] Level (Gait Training Goal 1, PT) standby assist  -HC     Distance (Gait Training Goal 1, PT) 100 ft  -HC     Time Frame (Gait Training Goal 1, PT) 2 weeks  -HC           User Key  (r) = Recorded By, (t) = Taken By, (c) = Cosigned By    Initials Name Provider Type    HC Lillian Villalta, PT Physical Therapist               Clinical Impression     Row Name 12/11/21 1136          Pain    Additional Documentation Pain Scale: Numbers Pre/Post-Treatment (Group)  -     Row Name 12/11/21 1136          Pain Scale: Numbers Pre/Post-Treatment    Pretreatment Pain Rating 0/10 - no pain  -     Posttreatment Pain Rating 0/10 - no pain  -     Row Name 12/11/21 1136          Plan of Care Review    Outcome Summary Pt is 65 yo female admitted for SOA, PNA, coumadin toxicity, candidemia, ?infected port.  Pt s/p removal of port on 12/10/2021.  Pt has ovarian cancer with mets to brain, bone, abdominal organs.  -     Row Name 12/11/21 1131          Therapy Assessment/Plan (PT)    Patient/Family Therapy Goals Statement (PT) increase strength  -HC     Rehab Potential (PT) good, to achieve stated therapy goals  -     Criteria for Skilled Interventions Met (PT) yes; meets criteria  -     Predicted Duration of Therapy Intervention (PT) until d/c  -     Row Name 12/11/21 1130          Vital Signs    Intra  SpO2 (%) 87  -HC     O2 Delivery Intra Treatment supplemental O2  5L  -HC     Post SpO2 (%) 91  -HC     O2 Delivery Post Treatment supplemental O2  5L  -HC     Row Name 12/11/21 1136          Positioning and Restraints    Pre-Treatment Position in bed  -HC     Post Treatment Position chair  -HC     In Chair notified nsg; call light within reach; encouraged to call for assist; exit alarm on  -HC           User Key  (r) = Recorded By, (t) = Taken By, (c) = Cosigned By    Initials Name Provider Type     Lillian Villalta, PT Physical Therapist               Outcome Measures     Row Name 12/11/21 1139          How much help from another person do you currently need...    Turning from your back to your side while in flat bed without using bedrails? 3  -HC     Moving from lying on back to sitting on the side of a flat bed without bedrails? 3  -HC     Moving to and from a bed to a chair (including a wheelchair)? 2  -HC     Standing up from a chair using your arms (e.g., wheelchair, bedside chair)? 2  -HC     Climbing 3-5 steps with a railing? 1  -HC     To walk in hospital room? 2  -HC     AM-PAC 6 Clicks Score (PT) 13  -     Row Name 12/11/21 1139          Modified [place] Scale    Modified [place] Scale 4 - Moderately severe disability.  Unable to walk without assistance, and unable to attend to own bodily needs without assistance.  -     Row Name 12/11/21 1139          Functional Assessment    Outcome Measure Options AM-PAC 6 Clicks Basic Mobility (PT); Modified [place]  -           User Key  (r) = Recorded By, (t) = Taken By, (c) = Cosigned By    Initials Name Provider Type     Lillian Villalta, PT Physical Therapist                             Physical Therapy Education                 Title: PT OT SLP Therapies (In Progress)     Topic: Physical Therapy (In Progress)     Point: Mobility training (In Progress)     Learning Progress Summary           Patient Acceptance, E, NR by  at 12/11/2021 1132                    Point: Precautions (In Progress)     Learning Progress Summary           Patient Acceptance, E, NR by HC at 12/11/2021 1139                               User Key     Initials Effective Dates Name Provider Type Discipline     06/16/21 -  Lillian Villalta, PT Physical Therapist PT              PT Recommendation and Plan  Planned Therapy Interventions (PT): balance training, bed mobility training, gait training, neuromuscular re-education, strengthening, patient/family education, postural re-education, transfer training, stair training, ROM (range of motion)  Plan of Care Reviewed With: patient  Progress: improving  Outcome Summary: Pt is 65 yo female admitted for SOA, PNA, coumadin toxicity, candidemia, ?infected port.  Pt s/p removal of port on 12/10/2021.  Pt has ovarian cancer with mets to brain, bone, abdominal organs.  Pt presnets with global weakness, poor endurance.  Pt requiring Luca-modA for transfers.  Pt initially ambulated to bathroom with HHA-modA and poor balance observed.  Pt then ambulated using RW with improved balance, however pt continuing to require Luca due to weakness.  Pt with SOA and fatigue noted after mobilizing to/from bathroom with O2 measuring 90% after seated rest break.  Unable to ambulate longer distance due to weakness.  Pt far from mobility baseline of ambulating indep and negotiating 14 steps to bedroom.  Pt will need extensive IP rehab to address deficits.  PT will follow 3x/week while at [facility].     Time Calculation:    PT Charges     Row Name 12/11/21 1141             Time Calculation    Start Time 0908  -      Stop Time 0935  -      Time Calculation (min) 27 min  -      PT Received On 12/11/21  -      PT - Next Appointment 12/13/21  -      PT Goal Re-Cert Due Date 12/25/21  -              Time Calculation- PT    Total Timed Code Minutes- PT 10 minute(s)  -            User Key  (r) = Recorded By, (t) = Taken By, (c) = Cosigned By    Initials Name Provider Type     HC Lillian Villalta, PT Physical Therapist              Therapy Charges for Today     Code Description Service Date Service Provider Modifiers Qty    31267624611 HC PT EVAL MOD COMPLEXITY 3 12/11/2021 Lillian Villalta, PT GP 1    65028670226 HC GAIT TRAINING EA 15 MIN 12/11/2021 Lillian Villalta, PT GP 1          PT G-Codes  Outcome Measure Options: AM-PAC 6 Clicks Basic Mobility (PT), Modified [place]a  AM-PAC 6 Clicks Score (PT): 13  Modified [place] Scale: 4 - Moderately severe disability.  Unable to walk without assistance, and unable to attend to own bodily needs without assistance.    Lillian Villalta, PT  12/11/2021      Electronically signed by Lillian Villalta, PT at 12/11/21 1144

## 2021-12-12 NOTE — PROGRESS NOTES
"Pharmacy dosing service  Anticoagulant  Warfarin     Subjective:    Tahira Zimmerman is a 66 y.o.female being continued on warfarin for atrial fibrillation.    INR Goal: 2 - 3  Home medication?: Yes, warfarin 4 mg PO every day at 1800 for the last week.  Prior patient was on warfarin 5mg daily.  Bridge Therapy Present?:  Yes,  Enoxaparin 70 mg SQ Q12H  Interacting Medications Evaluation (New/Present/Discontinued): luis fernando  Additional Contributing Factors: Bacteremia       Assessment/Plan:    Patient admitted with supratherapeutic INR.  Vitamin K 10mg sq given x1 on 12/8.  Home dose for the last week was warfarin 4mg daily, per patient report.  INR continues to drop to 1.24.  Warfarin restarted 12/9 after INR dropped back to therapeutic level.  It will take some time to overcome the effects of the vitamin K before we start to see a rise in INR. Treatment lovenox started 12/10.  3mg dose planned for 12/10 was missed. Pt received 3 mg dose last night. Per Dr. Ball, will give 4mg x1 today. OK to dc lovenox once INR is therapeutic.    Continue to monitor and adjust based on INR.         Date 12/9 12/10 12/11 12/12        INR 2.23 1.27 1.24 1.19        Dose 2mg Not ordered 3mg 4mg            Objective:  [Ht: 165.1 cm (65\"); Wt: 70.4 kg (155 lb 4.8 oz); BMI: Body mass index is 25.84 kg/m².]    Lab Results   Component Value Date    ALBUMIN 2.30 (L) 12/12/2021     Lab Results   Component Value Date    INR 1.19 (L) 12/12/2021    INR 1.24 (L) 12/11/2021    INR 1.27 (L) 12/10/2021    PROTIME 13.0 (L) 12/12/2021    PROTIME 13.5 (L) 12/11/2021    PROTIME 13.9 (L) 12/10/2021     Lab Results   Component Value Date    HGB 9.3 (L) 12/12/2021    HGB 8.6 (L) 12/11/2021    HGB 8.3 (L) 12/10/2021     Lab Results   Component Value Date    HCT 29.1 (L) 12/12/2021    HCT 26.6 (L) 12/11/2021    HCT 25.6 (L) 12/10/2021       Marysol Kaplan, Shea  12/12/21 13:21 EST             "

## 2021-12-12 NOTE — PLAN OF CARE
Goal Outcome Evaluation:  Plan of Care Reviewed With: patient        Progress: no change  Outcome Summary: Mg replaced. Pt on 5L nc. Vitals stable.

## 2021-12-12 NOTE — NURSING NOTE
Assumed care of pt at this time. Pt currently sitting up in bed. She denies having any nausea or complaints. Will continue to monitor.

## 2021-12-12 NOTE — DISCHARGE PLACEMENT REQUEST
"Tahira Roblero (66 y.o. Female)             Date of Birth Social Security Number Address Home Phone MRN    1955  [address] [id number] [phone] [id number]    Roman Catholic Marital Status             [employer]ev[employer]        Admission Date Admission Type Admitting Provider Attending Provider Department, Room/Bed    12/8/21 Emergency Jaky Pettit MD Palmer, Karen, DO [facility] 3A MEDICAL INPATIENT, 345/1    Discharge Date Discharge Disposition Discharge Destination                         Attending Provider: Carlee Carlson DO    Allergies: Morphine, Penicillin V Potassium, Sulfa Antibiotics, Levaquin [Levofloxacin], Amoxicillin    Isolation: None   Infection: None   Code Status: No CPR   Advance Care Planning Activity    Ht: 165.1 cm (65\")   Wt: 70.4 kg (155 lb 4.8 oz)    Admission Cmt: None   Principal Problem: Port-A-Cath in place [Z95.828]                 Active Insurance as of 12/8/2021     Primary Coverage     Payor Plan Insurance Group Employer/Plan Group    MEDICARE MEDICARE A & B      Payor Plan Address Payor Plan Phone Number Payor Plan Fax Number Effective Dates    [address] [phone]  7/1/2020 - None Entered    [facility] [id number]       Subscriber Name Subscriber Birth Date Member ID       TAHIRA ROBLERO 1955 [id number]           Secondary Coverage     Payor Plan Insurance Group Employer/Plan Group    [employer]      Payor Plan Address Payor Plan Phone Number Payor Plan Fax Number Effective Dates    [address] [phone]  7/1/2020 - None Entered    [place] [address]       Subscriber Name Subscriber Birth Date Member ID       TAHIRA ROBLERO 1955 [id number]           Tertiary Coverage     Payor Plan Insurance Group Employer/Plan Group    [place]      Payor Plan Address Payor Plan Phone Number Payor Plan Fax Number Effective Dates    [address]   7/1/2020 " "- None Entered    [place] [address]       Subscriber Name Subscriber Birth Date Member ID       EDE ROBLERO 1955 [id number]                 Emergency Contacts      (Rel.) Home Phone Work Phone Mobile Phone    DANIELLE MOLINA (Daughter) [phone] -- [phone]               History & Physical      Essing-Josiane Malin APRN at 21 3657     Attestation signed by Jaky Pettit MD at 21 0917    Attending Physician Attestation    I have reviewed the mid-level provider documentation, agree with the documentation, medical decision making and treatment plan as outlined by the mid-level provider. I have reviewed this documentation and agree.                        [facility] Medicine Services      Patient Name: Ede Roblero  : 1955  MRN: [id number]  Primary Care Physician:  Noemy Queen MD  Date of admission: 2021      Subjective      Chief Complaint: shortness of air     History of Present Illness: Ede Roblero is a 66 y.o. female who presented to [facility] on 2021 complaining of increased shortness of air past several days.  She reports cough productive of yellowish-whitish sputum.  She denies any fever chills or diaphoresis.  She reports using oxygen at home at 6 to 8 L.  She is currently stable on same.  He has a past medical history of ovarian cancer initially diagnosed in .  He has metastasis to the brain, bone, and various abdominal organs.  He is being followed by oncology and receiving radiation and oral chemotherapy.  She is awake alert and a good historian.  She denies any altered mental status or confusion.  She reports \"I am just fine\".  She appears to be answering appropriately.  She reports she is a DNR with regards to no cardiac compressions however she is willing to be intubated if needed.  WBC is not elevated and she is afebrile.  Chest x-ray per radiology today:    \"1.Bilateral pulmonary " "infiltrates. Given history of malignancy this  might relate to lymphangitic spread of tumor. An underlying inflammatory  infectious process would be in the differential. Correlation with  clinical findings suggested.  \"    CT head per radiology today:    \"IMPRESSION:  1.Intracranial calcifications which have been suggested and could  reflect sequela to treated brain metastases.  2.Hyperostosis frontalis  3.Sclerotic area involving the clivus which could relate to known  metastatic disease. There also are some sclerotic areas involving the  calvarium near the convexity of the head.  \"    She has no slurred speech or facial droop and no focal deficits.  She reports a past medical history of DVTs and is on home warfarin.  INR today was greater than 8 and she was given vitamin K in the emergency department.  Potassium was 3.3.  Review of records shows she was admitted here in August for COVID-19.  She is COVID-19 negative today.  She was started on IV ceftriaxone and IV azithromycin and will be admitted for further evaluation and treatment.  Medical history also includes hypertension, hyperlipidemia, anxiety and depression and anemia.          Review of Systems   Constitutional: Positive for decreased appetite and weight loss.   HENT: Negative.    Eyes: Negative.    Cardiovascular: Positive for dyspnea on exertion.   Respiratory: Positive for cough, shortness of breath and sputum production.    Endocrine: Negative.    Hematologic/Lymphatic: Negative.    Skin: Negative.    Musculoskeletal: Positive for muscle weakness.   Gastrointestinal: Negative.    Genitourinary: Negative.    Neurological: Positive for weakness.   Psychiatric/Behavioral: Negative.    Allergic/Immunologic: Negative.        Personal History     Past Medical History:   Diagnosis Date   • Anemia 2013   • Cervical disc disorder 2013    Herniated disc, pinched nerve   • Clotting disorder (HCC) 2013    Low platelets from chemo   • Colon polyp 2013   • Deep " vein thrombosis (HCC) 2013   • Diverticulosis 2013   • GERD (gastroesophageal reflux disease) 2016   • HL (hearing loss) 2016    I need hearing aids   • Hyperlipidemia 2013    Need my cholesterol rechecked   • Hypertension    • Joint pain 2013    Shoulder pain, torn rotator cuff   • Low back pain 2013   • Neuromuscular disorder (HCC) 2015    Shingles, pinched nerves in my neck   • Osteopenia 2014   • Osteoporosis    • Ovarian cancer (HCC)     ovarian--  spread to lungs 10/2020   • Ovarian cancer on left (HCC) 1995   • Pneumonia 2010    Have had it several times   • Spinal stenosis 2013    Cervical   • Urinary tract infection 2013    Have had several utis       Past Surgical History:   Procedure Laterality Date   • BRONCHOSCOPY N/A 2/10/2021    Procedure: BRONCHOSCOPY with bronchioalveolar lavage;  Surgeon: Jerica Esparza MD;  Location: [facility] ENDOSCOPY;  Service: Pulmonary;  Laterality: N/A;  post op:right lobe pneumonia   • BRONCHOSCOPY N/A 6/15/2021    Procedure: BRONCHOSCOPY with bronchoalveolar lavage;  Surgeon: Jerica Esparza MD;  Location: [facility] ENDOSCOPY;  Service: Pulmonary;  Laterality: N/A;  lung cancer;pneumonia   • CATARACT EXTRACTION     • COLON SURGERY     • COLONOSCOPY  05/26/2018   • EXPLORATORY LAPAROTOMY     • HERNIA REPAIR     • HYSTERECTOMY     • OOPHORECTOMY         Family History: family history includes Cancer in her father; Hypertension in her brother, father, mother, and sister; Stroke in her brother. Otherwise pertinent FHx was reviewed and not pertinent to current issue.    Social History:  reports that she has never smoked. She has never used smokeless tobacco. She reports that she does not drink alcohol and does not use drugs.    Home Medications:  Prior to Admission Medications     Prescriptions Last Dose Informant Patient Reported? Taking?    acetaminophen (TYLENOL) 500 MG tablet   Yes No    Take 1,000 mg by mouth Every 6 (Six) Hours As Needed for Mild Pain .    Calcium Carbonate 1500 (600  Ca) MG tablet   Yes No    Take 600 mg by mouth 2 (Two) Times a Day.    calcium carbonate-vitamin d 600-400 MG-UNIT per tablet   No No    Take 1 tablet by mouth 2 (Two) Times a Day.    cetirizine (zyrTEC) 10 MG tablet   No No    Take 1 tablet by mouth Every Night.    Combivent Respimat  MCG/ACT inhaler   Yes No    INHALE 1 PUFF FOUR TIMES DAILY AS NEEDED FOR SHORTNESS OF BREATH    cyanocobalamin 1000 MCG/ML injection   Yes No    Inject 1,000 mcg into the appropriate muscle as directed by prescriber Every 28 (Twenty-Eight) Days.    famotidine (PEPCID) 40 MG tablet   No No    Take 1 tablet by mouth Every Morning Before Breakfast.    folic acid (FOLVITE) 1 MG tablet   No No    Take 1 tablet by mouth Daily.    HYDROcodone-homatropine (HYCODAN) 5-1.5 MG/5ML syrup   No No    Take 5 mL by mouth Every 8 (Eight) Hours As Needed for Cough.    letrozole (FEMARA) 2.5 MG tablet   No No    Take 1 tablet by mouth Daily.    MAGnesium-Oxide 400 (241.3 Mg) MG tablet tablet   No No    Take 1 tablet by mouth 3 (Three) Times a Day.    Misc. Devices (Commode Bedside) misc   No No    1 Device Daily.    Misc. Devices (Roller Walker) misc   No No    1 Device Daily.    montelukast (SINGULAIR) 10 MG tablet   No No    TAKE 1 TABLET BY MOUTH EVERY NIGHT    Mucus Relief  MG 12 hr tablet   No No    TAKE TWO TABLETS BY MOUTH TWICE A DAY    ondansetron (ZOFRAN) 8 MG tablet   Yes No    oxyCODONE (ROXICODONE) 20 MG tablet   No No    Take 1 tablet by mouth Every 4 (Four) Hours As Needed for Moderate Pain .    oxyCODONE (ROXICODONE) 20 MG tablet   No No    Take 1 to 1.5 tabs every 4 hours as needed for moderate pain.    phosphorus (K PHOS NEUTRAL) 155-852-130 MG tablet   No No    Take 1 tablet by mouth 3 (Three) Times a Day.    potassium chloride (K-DUR,KLOR-CON) 20 MEQ CR tablet   Yes No    Take 60 mEq by mouth Daily.    potassium chloride (K-DUR,KLOR-CON) 20 MEQ CR tablet   No No    Take 2 tablets by mouth Every Night.    pregabalin  (LYRICA) 100 MG capsule   No No    Take 1 capsule by mouth 3 (Three) Times a Day.    sertraline (ZOLOFT) 50 MG tablet   Yes No    Take 50 mg by mouth Every Night.    temazepam (RESTORIL) 30 MG capsule   No No    TAKE 1 CAPSULE BY MOUTH AT NIGHT AS NEEDED FOR SLEEP    triamterene-hydrochlorothiazide (DYAZIDE) 37.5-25 MG per capsule   No No    Take 1 capsule by mouth Daily.    warfarin (COUMADIN) 2 MG tablet   No No    DVT  Indications: Atrial Fibrillation    Patient taking differently:  5 mg. DVT  Indications: Atrial Fibrillation    warfarin (COUMADIN) 4 MG tablet   No No    Take as directed            Allergies:  Allergies   Allergen Reactions   • Morphine Nausea Only and Hives   • Penicillin V Potassium Rash   • Sulfa Antibiotics Rash   • Levaquin [Levofloxacin] Nausea Only and Dizziness     When taken with Coumadin   • Amoxicillin Rash       Objective      Vitals:   Temp:  [98 °F (36.7 °C)] 98 °F (36.7 °C)  Heart Rate:  [85-95] 85  Resp:  [22] 22  BP: (116-133)/(67-77) 118/68  Flow (L/min):  [4-8] 4    Physical Exam  Vitals reviewed.   Constitutional:       Appearance: Normal appearance. She is obese.   HENT:      Head: Normocephalic.      Comments: alopecia     Right Ear: External ear normal.      Left Ear: External ear normal.      Nose: Nose normal.      Mouth/Throat:      Mouth: Mucous membranes are moist.   Eyes:      Extraocular Movements: Extraocular movements intact.   Cardiovascular:      Rate and Rhythm: Normal rate and regular rhythm.      Pulses: Normal pulses.      Heart sounds: Normal heart sounds.   Pulmonary:      Effort: Pulmonary effort is normal.      Breath sounds: Normal breath sounds.   Abdominal:      Palpations: Abdomen is soft.   Genitourinary:     Comments: deferred  Musculoskeletal:         General: Normal range of motion.      Cervical back: Normal range of motion and neck supple.   Skin:     General: Skin is warm and dry.   Neurological:      General: No focal deficit present.       Mental Status: She is alert and oriented to person, place, and time.   Psychiatric:         Mood and Affect: Mood normal.         Behavior: Behavior normal.         Thought Content: Thought content normal.         Judgment: Judgment normal.         Result Review    Result Review:  I have personally reviewed the results from the time of this admission to 12/8/2021 22:06 EST and agree with these findings:  [x]  Laboratory  [x]  Microbiology  [x]  Radiology  []  EKG/Telemetry   []  Cardiology/Vascular   []  Pathology  [x]  Old records  []  Other:  Most notable findings include: afebrile, CXR per radiology bilateral infiltrates, CT head per radiology known mets, INR > 8 and K  3.3     Assessment/Plan        Active Hospital Problems:  Active Hospital Problems    Diagnosis    • Pneumonia due to infectious organism    • Coumadin toxicity    • AMS (altered mental status)    • Hypokalemia    • Bone metastases (HCC)    • Depression    • Brain metastases (HCC)    • Malignant neoplasm of right ovary (HCC)      Plan:    Pneumonia due to infectious organism, COVID-19 negative, chest x-ray per radiology shows bilateral pulmonary infiltrates, WBC not elevated, continue IV ceftriaxone and IV azithromycin until blood cultures resulted, DuoNebs every 4 hours as needed and albuterol every 6 hours as needed, stable on home 6 L oxygen via nasal cannula    Coumadin toxicity INR greater than 8 with past medical history of DVT, hold home Coumadin for now given vitamin K in ED, INR daily pharmacy to dose warfarin to INR    Altered mental status?  Patient appears awake and alert and answers appropriately, she denies confusion, CT head negative for acute changes, mets to brain known    Hypokalemia mild potassium 3.3, potassium protocol in place repeat BNP in a.m. magnesium in a.m.    Ovarian  cancer with mets to bone brain and abdominal, followed by  of oncology, consulted Home meds unverified at this time reorder pending verification  pharmacy    Hypertension, controlled on home meds unverified at this time reorder pending verification from pharmacy monitor BP    Anxiety depression, home meds unverified at this time reorder pending verification from pharmacy    Poor appetite, weight loss likely secondary to malignancy, boost 3 times daily ordered, dietary consulted    DVT prophylaxis:  No DVT prophylaxis order currently exists.    CODE STATUS:    Level Of Support Discussed With: Patient  Code Status (Patient has no pulse and is not breathing): No CPR (Do Not Attempt to Resuscitate)  Medical Interventions (Patient has pulse or is breathing): Full Support    Admission Status:  I believe this patient meets inpatient status.    I discussed the patient's findings and my recommendations with patient.    This patient has been examined wearing appropriate Personal Protective Equipment    Signature: Electronically signed by IRENA Malave, 21, 10:11 PM EST.    Electronically signed by Jaky Pettit MD at 21 0917          Physician Progress Notes (last 24 hours)      Alex Buchanan MD at 21 1559          PULMONARY CRITICAL CARE Progress  NOTE      PATIENT IDENTIFICATION:  Name: Tahira Zimmerman  MRN: [id number]  :  1955     Age: 66 y.o.  Sex: female    DATE OF Note:  2021   Referring Physician: Jaky Pettit MD                  Subjective:   Feeling better, no new issue, no SOB no chest pain, no nausea or vomiting, no change in bowel habit, no dysuria,  no new  skin rash or itching.      Objective:  tMax 24 hrs: Temp (24hrs), Av.2 °F (36.8 °C), Min:97.9 °F (36.6 °C), Max:98.7 °F (37.1 °C)      Vitals Ranges:   Temp:  [97.9 °F (36.6 °C)-98.7 °F (37.1 °C)] 98.7 °F (37.1 °C)  Heart Rate:  [66-90] 90  Resp:  [16-20] 16  BP: (106-152)/(66-87) 120/67    Intake and Output Last 3 Shifts:   I/O last 3 completed shifts:  In: 1324 [P.O.:420; I.V.:804; IV Piggyback:100]  Out: -     Exam:  /67 (BP Location:  "Right arm, Patient Position: Sitting)   Pulse 90   Temp 98.7 °F (37.1 °C) (Oral)   Resp 16   Ht 165.1 cm (65\")   Wt 70.4 kg (155 lb 4.8 oz)   LMP  (LMP Unknown)   SpO2 96%   BMI 25.84 kg/m²     General Appearance:     HEENT:  Normocephalic, without obvious abnormality, Conjunctiva/corneas clear,.  Normal external ear canals, Nares normal, no drainage     Neck:  Supple, symmetrical, trachea midline. No JVD.  Lungs /Chest wall:   Bilateral basal rhonchi, respirations unlabored symmetrical wall movement.     Heart:  Regular rate and rhythm, systolic murmur PMI left sternal border  Abdomen: Soft, non-tender, no masses, no organomegaly.    Extremities: Trace edema no clubbing or Cyanosis        Medications:    Current Facility-Administered Medications:   •  acetaminophen (TYLENOL) tablet 650 mg, 650 mg, Oral, Q4H PRN **OR** acetaminophen (TYLENOL) 160 MG/5ML solution 650 mg, 650 mg, Oral, Q4H PRN **OR** acetaminophen (TYLENOL) suppository 650 mg, 650 mg, Rectal, Q4H PRN, Jose Urena MD  •  albuterol sulfate HFA (PROVENTIL HFA;VENTOLIN HFA;PROAIR HFA) inhaler 2 puff, 2 puff, Inhalation, Q6H PRN, Jose Urena MD  •  sennosides-docusate (PERICOLACE) 8.6-50 MG per tablet 2 tablet, 2 tablet, Oral, BID, 2 tablet at 12/12/21 0854 **AND** polyethylene glycol (MIRALAX) packet 17 g, 17 g, Oral, Daily PRN **AND** bisacodyl (DULCOLAX) EC tablet 5 mg, 5 mg, Oral, Daily PRN **AND** bisacodyl (DULCOLAX) suppository 10 mg, 10 mg, Rectal, Daily PRN, Jose Urena MD  •  budesonide (PULMICORT) nebulizer solution 0.5 mg, 0.5 mg, Nebulization, BID - RT, Amanda Walter, APRN, 0.5 mg at 12/12/21 0742  •  calcium 500 mg vitamin D 5 mcg (200 UT) per tablet 1 tablet, 1 tablet, Oral, BID, Jose Urena MD, 1 tablet at 12/12/21 0854  •  cefTRIAXone (ROCEPHIN) 1 g in sodium chloride 0.9 % 100 mL IVPB, 1 g, Intravenous, Q24H, Amanda Walter APRN, Last Rate: 200 mL/hr at 12/11/21 2237, 1 g at " 12/11/21 2237  •  cetirizine (zyrTEC) tablet 10 mg, 10 mg, Oral, Nightly, Jose Urena MD, 10 mg at 12/11/21 2135  •  enoxaparin (LOVENOX) syringe 70 mg, 1 mg/kg, Subcutaneous, Q12H, Jose Urena MD, 70 mg at 12/12/21 0853  •  erythromycin (ROMYCIN) ophthalmic ointment, , Right Eye, 4x Daily, Jose Urena MD, Given at 12/12/21 1249  •  famotidine (PEPCID) tablet 40 mg, 40 mg, Oral, QAM AC, Jose Urena MD, 40 mg at 12/12/21 0855  •  folic acid (FOLVITE) tablet 1,000 mcg, 1,000 mcg, Oral, Daily, Jose Urena MD, 1,000 mcg at 12/12/21 0855  •  guaiFENesin (MUCINEX) 12 hr tablet 1,200 mg, 1,200 mg, Oral, BID, Jose Urena MD, 1,200 mg at 12/12/21 0855  •  ipratropium-albuterol (DUO-NEB) nebulizer solution 3 mL, 3 mL, Nebulization, Q4H PRN, Jose Urena MD, 3 mL at 12/11/21 0436  •  ipratropium-albuterol (DUO-NEB) nebulizer solution 3 mL, 3 mL, Nebulization, 4x Daily - RT, Amanda Walter APRN, 3 mL at 12/12/21 1030  •  letrozole (FEMARA) tablet 2.5 mg, 2.5 mg, Oral, Daily, Jose Urena MD, 2.5 mg at 12/12/21 0854  •  magnesium oxide (MAG-OX) tablet 400 mg, 400 mg, Oral, TID, Jose Urena MD, 400 mg at 12/12/21 1535  •  magnesium sulfate 4 gram infusion - Mg less than or equal to 1mg/dL, 4 g, Intravenous, PRN, Last Rate: 25 mL/hr at 12/11/21 1830, 4 g at 12/11/21 1830 **OR** magnesium sulfate 3 gram infusion (1gm x 3) - Mg 1.1 - 1.5 mg/dL, 1 g, Intravenous, PRN **OR** Magnesium Sulfate 2 gram infusion- Mg 1.6 - 1.9 mg/dL, 2 g, Intravenous, PRN, Jaky Pettit MD  •  melatonin tablet 5 mg, 5 mg, Oral, Nightly PRN, Jose Urena MD  •  micafungin sodium (MYCAMINE) 150 mg in sodium chloride 0.9 % 100 mL IVPB, 150 mg, Intravenous, Q24H, Cony Shelby MD, Last Rate: 100 mL/hr at 12/11/21 1716, 150 mg at 12/11/21 1716  •  montelukast (SINGULAIR) tablet 10 mg, 10 mg, Oral, Nightly, Jose Urena,  MD, 10 mg at 12/11/21 2134  •  ondansetron (ZOFRAN) tablet 4 mg, 4 mg, Oral, Q6H PRN **OR** ondansetron (ZOFRAN) injection 4 mg, 4 mg, Intravenous, Q6H PRN, Jose Urena MD, 4 mg at 12/12/21 1027  •  oxyCODONE (ROXICODONE) immediate release tablet 20 mg, 20 mg, Oral, Q4H PRN, Jose Urena MD, 20 mg at 12/10/21 1546  •  Pharmacy to Dose enoxaparin (LOVENOX), , Does not apply, Continuous PRN, Jose Urena MD  •  Pharmacy to dose warfarin, , Does not apply, Continuous PRN, Jose Urena MD  •  phosphorus (K PHOS NEUTRAL) tablet 1 tablet, 250 mg, Oral, TID, Jose Urena MD, 1 tablet at 12/12/21 1535  •  potassium chloride (K-DUR,KLOR-CON) CR tablet 20 mEq, 20 mEq, Oral, BID With Meals, Jose Urena MD, 20 mEq at 12/12/21 0855  •  potassium chloride (K-DUR,KLOR-CON) CR tablet 40 mEq, 40 mEq, Oral, PRN, 40 mEq at 12/09/21 0354 **OR** potassium chloride (KLOR-CON) packet 40 mEq, 40 mEq, Oral, PRN **OR** potassium chloride 10 mEq in 100 mL IVPB, 10 mEq, Intravenous, Q1H PRN, Jose Urena MD  •  pregabalin (LYRICA) capsule 100 mg, 100 mg, Oral, TID, Jose Urena MD, 100 mg at 12/12/21 1535  •  sertraline (ZOLOFT) tablet 50 mg, 50 mg, Oral, Nightly, Jose Urena MD, 50 mg at 12/11/21 2134  •  sodium chloride 0.9 % flush 10 mL, 10 mL, Intravenous, PRN, Jose Urena MD  •  sodium chloride 0.9 % flush 3 mL, 3 mL, Intravenous, Q12H, Jose Urena MD, 3 mL at 12/12/21 0856  •  sodium chloride 0.9 % flush 3-10 mL, 3-10 mL, Intravenous, PRN, Jose Urena MD  •  sodium chloride 0.9 % infusion, 75 mL/hr, Intravenous, Continuous, Jose Urena MD, Last Rate: 75 mL/hr at 12/12/21 0017, 75 mL/hr at 12/12/21 0017  •  temazepam (RESTORIL) capsule 30 mg, 30 mg, Oral, Nightly PRN, Jose Urena MD  •  triamterene-hydrochlorothiazide (MAXZIDE-25) 37.5-25 MG per tablet 1 tablet, 1  tablet, Oral, Daily, Jose Urena MD, 1 tablet at 12/12/21 0855  •  warfarin (COUMADIN) tablet 4 mg, 4 mg, Oral, Once, Cindy Ball MD    Data Review:  All labs (24hrs):   Recent Results (from the past 24 hour(s))   Protime-INR    Collection Time: 12/12/21 10:48 AM    Specimen: Blood   Result Value Ref Range    Protime 13.0 (L) 19.4 - 28.5 Seconds    INR 1.19 (L) 2.00 - 3.00   Comprehensive Metabolic Panel    Collection Time: 12/12/21 10:48 AM    Specimen: Blood   Result Value Ref Range    Glucose 107 (H) 65 - 99 mg/dL    BUN 17 8 - 23 mg/dL    Creatinine 0.60 0.57 - 1.00 mg/dL    Sodium 136 136 - 145 mmol/L    Potassium 4.4 3.5 - 5.2 mmol/L    Chloride 99 98 - 107 mmol/L    CO2 25.0 22.0 - 29.0 mmol/L    Calcium 7.2 (L) 8.6 - 10.5 mg/dL    Total Protein 6.1 6.0 - 8.5 g/dL    Albumin 2.30 (L) 3.50 - 5.20 g/dL    ALT (SGPT) 14 1 - 33 U/L    AST (SGOT) 25 1 - 32 U/L    Alkaline Phosphatase 189 (H) 39 - 117 U/L    Total Bilirubin 0.2 0.0 - 1.2 mg/dL    eGFR Non African Amer 100 >60 mL/min/1.73    Globulin 3.8 gm/dL    A/G Ratio 0.6 g/dL    BUN/Creatinine Ratio 28.3 (H) 7.0 - 25.0    Anion Gap 12.0 5.0 - 15.0 mmol/L   CBC Auto Differential    Collection Time: 12/12/21 10:48 AM    Specimen: Blood   Result Value Ref Range    WBC 8.50 3.40 - 10.80 10*3/mm3    RBC 3.58 (L) 3.77 - 5.28 10*6/mm3    Hemoglobin 9.3 (L) 12.0 - 15.9 g/dL    Hematocrit 29.1 (L) 34.0 - 46.6 %    MCV 81.3 79.0 - 97.0 fL    MCH 26.1 (L) 26.6 - 33.0 pg    MCHC 32.1 31.5 - 35.7 g/dL    RDW 18.7 (H) 12.3 - 15.4 %    RDW-SD 53.4 37.0 - 54.0 fl    MPV 8.8 6.0 - 12.0 fL    Platelets 195 140 - 450 10*3/mm3    Neutrophil % 85.9 (H) 42.7 - 76.0 %    Lymphocyte % 4.4 (L) 19.6 - 45.3 %    Monocyte % 8.1 5.0 - 12.0 %    Eosinophil % 1.2 0.3 - 6.2 %    Basophil % 0.4 0.0 - 1.5 %    Neutrophils, Absolute 7.30 (H) 1.70 - 7.00 10*3/mm3    Lymphocytes, Absolute 0.40 (L) 0.70 - 3.10 10*3/mm3    Monocytes, Absolute 0.70 0.10 - 0.90 10*3/mm3     Eosinophils, Absolute 0.10 0.00 - 0.40 10*3/mm3    Basophils, Absolute 0.00 0.00 - 0.20 10*3/mm3    nRBC 0.0 0.0 - 0.2 /100 WBC   Magnesium    Collection Time: 12/12/21 10:48 AM    Specimen: Blood   Result Value Ref Range    Magnesium 1.6 1.6 - 2.4 mg/dL        Imaging:  XR Chest 1 View  Narrative: EXAMINATION: XR CHEST 1 VW-     DATE OF EXAM: 12/11/2021 5:08 PM     INDICATION: SOB; J18.9-Pneumonia, unspecified organism; R06.02-Shortness  of breath; R93.0-Abnormal findings on diagnostic imaging of skull and  head, not elsewhere classified; R79.1-Abnormal coagulation profile;  Z95.828-Presence of other vascular implants and grafts.     COMPARISON: Chest radiograph dated 12/08/2021     TECHNIQUE: Portable AP view of the chest was obtained.     FINDINGS:  The cardiomediastinal silhouette is within normal limits. Pulmonary  vascularity is unremarkable. There are patchy areas of airspace opacity  throughout both lungs likely representing multifocal pneumonia. There is  no pleural effusion or pneumothorax. There are degenerative changes of  the thoracic spine. There is no free air under the diaphragm.     Impression: 1. Patchy bilateral airspace opacities likely representing multifocal  pneumonia. No significant change from prior exam dated 12/08/2021.  2. Removal of the left-sided chest port.     Electronically Signed By-Cristian Pennington MD On:12/11/2021 5:49 PM  This report was finalized on [id number] by  Cristian Pennington MD.       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         Total Critical care time in direct medical management (   )  minutes  Alex Buchanan MD. D, ABSM.     2021  15:59 EST     Electronically signed by Alex Buchanan MD at 21 1601     Cindy Ball MD at 21 1301                Hematology/Oncology Inpatient Progress Note    PATIENT NAME: Tahira Zimmerman  : 1955  MRN: [id number]    CHIEF COMPLAINT: Shortness of breath    HISTORY OF PRESENT ILLNESS: Tahira Zimmerman is a 66 y.o. female who presented to [facility] on 2021 with complaints of shortness of breath for the past several days.  Shortness of breath is accompanied with productive cough with yellowish-white sputurm.  Denies fever, chills, diaphoresis, lightheadedness, altered mental status or confusion.  Patient reports being on oxygen at home at 6 to 8 LPM per nasal cannula.  She is currently receiving oral chemotherapy for Metastatic ovarian cancer with mets to brain, bone and various abdominal organs.       21  Hematology/Oncology was consulted for Ovarian cancer with mets to brain, bone and abdomen.  Patient is well known to our clinic and follows care with Dr.Rosaline Ball.  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  She was treated with surgery and then postoperatively with adjuvant chemotherapy consisting of Taxol and carboplatin.  Six months later, she had recurrence of disease intra-abdominally between the rectum and vagina.  She was treated with intraabdominal peritoneal chemotherapy and has been free of disease since that time until 2013 when she had recurrence.  She now has recurrent ovarian cancer with metastasis to brain, colon, abdominal wall and pulmonary involvement.  She was on Carboplatin and Doxil, Taxol, Carbo, Taxotere, single agent Gemzar, norpramin and then was on Doxil and Carboplatin.  Doxil was discontinued due to approaching lifetime dosing.  Patient had progression of disease on single agent  topotecan, Alimta and combination chemotherapy with Gemzar plus cisplatin.  CT abdomen and pelvis from 10/20/21 showed progression of metastatic disease within the chest, abdomen and pelvis.  CT head without contrast done 12/8/21 revealed intracranial calcifications which could reflect sequela to treated brain mets.  There is sclerotic area involving the clivus which could relate to known metastatic disease.  There are some sclerotic areas involving the calvarium near the convexity of the head.  Patient is currently on oral Letrozole with her course of brain radiation to the whole brain.       Chemotherapy history:    Doxorubicin Liposomal/Carboplatin: received 16 cycles: 2/15/2019 to 6/11/2020 : life time dose/progression of disease  Topotecan weekly: received 3 cycles : 6/2020 to 12/17/2020: progression of disease  Pemetrexed: Received 3 cycles: 12/23/20 to 3/10/2021: progression of disease  Cisplatin/Gemcitabine:  Received 2 cycles: 3/12/2021 to 5/7/2021- progression of disease  Denosumab (Xgeva) given every 28 days: Received 6 cycles out of 12 cycles- 2/1/21 to 7/16/21  Letrozole (Femara): 2.5mg by mouth daily: Started 9/22/2021: active treatment     She  has a past medical history of Anemia (2013), Cervical disc disorder (2013), Clotting disorder (HCC) (2013), Colon polyp (2013), Deep vein thrombosis (HCC) (2013), Diverticulosis (2013), GERD (gastroesophageal reflux disease) (2016), HL (hearing loss) (2016), Hyperlipidemia (2013), Hypertension, Joint pain (2013), Low back pain (2013), Neuromuscular disorder (HCC) (2015), Osteopenia (2014), Osteoporosis, Ovarian cancer (HCC), Ovarian cancer on left (HCC) (1995), Pneumonia (2010), Spinal stenosis (2013), and Urinary tract infection (2013).     PCP: Noemy Queen MD       I have reviewed and confirmed the accuracy of the patient's history: Chief complaint, HPI, ROS and Subjective as entered by the MA/LPN/RN. Cindyalexandrea Ball MD 12/12/21     12/10/2021:          History of present illness was reviewed and is unchanged from the previous visit. 12/10/21      Subjective      Patient has no new issues. Slowly improving.  Appetite has also improved    ROS:    Review of Systems   Constitutional: Positive for fatigue. Negative for activity change, appetite change, chills, diaphoresis, fever and unexpected weight change.   HENT: Negative for congestion, dental problem, ear discharge, ear pain, facial swelling, hearing loss, mouth sores, nosebleeds, sore throat, tinnitus, trouble swallowing and voice change.    Eyes: Negative for photophobia and visual disturbance.   Respiratory: Positive for cough and shortness of breath. Negative for choking, chest tightness and wheezing.    Cardiovascular: Negative for chest pain, palpitations and leg swelling.   Gastrointestinal: Negative for abdominal distention, abdominal pain, constipation, diarrhea, nausea and vomiting.   Endocrine: Negative.    Genitourinary: Negative for decreased urine volume, difficulty urinating, dysuria, flank pain, frequency, hematuria and urgency.   Musculoskeletal: Negative for back pain, gait problem, joint swelling, myalgias, neck pain and neck stiffness.   Skin: Negative for color change, rash and wound.   Neurological: Positive for weakness. Negative for dizziness, tremors, syncope, speech difficulty, numbness and headaches.   Hematological: Negative.    Psychiatric/Behavioral: Negative.         MEDICATIONS:      Scheduled Meds:  budesonide, 0.5 mg, Nebulization, BID - RT  calcium 500 mg vitamin D 5 mcg (200 UT), 1 tablet, Oral, BID  cefTRIAXone, 1 g, Intravenous, Q24H  cetirizine, 10 mg, Oral, Nightly  enoxaparin, 1 mg/kg, Subcutaneous, Q12H  erythromycin, , Right Eye, 4x Daily  famotidine, 40 mg, Oral, QAM AC  folic acid, 1,000 mcg, Oral, Daily  guaiFENesin, 1,200 mg, Oral, BID  ipratropium-albuterol, 3 mL, Nebulization, 4x Daily - RT  letrozole, 2.5 mg, Oral, Daily  magnesium oxide, 400 mg, Oral,  "TID  micafungin (MYCAMINE) IV, 150 mg, Intravenous, Q24H  montelukast, 10 mg, Oral, Nightly  phosphorus, 250 mg, Oral, TID  potassium chloride, 20 mEq, Oral, BID With Meals  pregabalin, 100 mg, Oral, TID  senna-docusate sodium, 2 tablet, Oral, BID  sertraline, 50 mg, Oral, Nightly  sodium chloride, 3 mL, Intravenous, Q12H  triamterene-hydrochlorothiazide, 1 tablet, Oral, Daily       Continuous Infusions:  Pharmacy to Dose enoxaparin (LOVENOX),   Pharmacy to dose warfarin,   sodium chloride, 75 mL/hr, Last Rate: 75 mL/hr (12/12/21 0017)       PRN Meds:  •  acetaminophen **OR** acetaminophen **OR** acetaminophen  •  albuterol sulfate HFA  •  senna-docusate sodium **AND** polyethylene glycol **AND** bisacodyl **AND** bisacodyl  •  ipratropium-albuterol  •  magnesium sulfate **OR** magnesium sulfate in D5W 1g/100mL (PREMIX) **OR** magnesium sulfate  •  melatonin  •  ondansetron **OR** ondansetron  •  oxyCODONE  •  Pharmacy to Dose enoxaparin (LOVENOX)  •  Pharmacy to dose warfarin  •  potassium chloride **OR** potassium chloride **OR** potassium chloride  •  sodium chloride  •  sodium chloride  •  temazepam     ALLERGIES:      Allergies   Allergen Reactions   • Morphine Nausea Only and Hives   • Penicillin V Potassium Rash   • Sulfa Antibiotics Rash   • Levaquin [Levofloxacin] Nausea Only and Dizziness     When taken with Coumadin   • Amoxicillin Rash       Objective    VITALS:   /66 (BP Location: Right arm)   Pulse 82   Temp 98.1 °F (36.7 °C) (Oral)   Resp 16   Ht 165.1 cm (65\")   Wt 70.4 kg (155 lb 4.8 oz)   LMP  (LMP Unknown)   SpO2 99%   BMI 25.84 kg/m²     PHYSICAL EXAM:     Physical Exam  Vitals and nursing note reviewed. Exam conducted with a chaperone present.   Constitutional:       General: She is not in acute distress.     Appearance: Normal appearance. She is obese. She is ill-appearing. She is not toxic-appearing or diaphoretic.   HENT:      Head: Normocephalic and atraumatic.      Right Ear: " Tympanic membrane, ear canal and external ear normal.      Left Ear: Tympanic membrane, ear canal and external ear normal.      Nose: Nose normal. No congestion or rhinorrhea.      Mouth/Throat:      Mouth: Mucous membranes are moist.      Pharynx: Oropharynx is clear.   Eyes:      General:         Right eye: Discharge present.         Left eye: No discharge.      Extraocular Movements: Extraocular movements intact.      Pupils: Pupils are equal, round, and reactive to light.      Comments: Improved- slight redness   Neck:      Vascular: No carotid bruit.   Cardiovascular:      Rate and Rhythm: Regular rhythm. Tachycardia present.      Pulses: Normal pulses.      Heart sounds: Normal heart sounds. No murmur heard.  No friction rub. No gallop.    Pulmonary:      Effort: Pulmonary effort is normal. No respiratory distress.      Breath sounds: No stridor. Wheezing and rhonchi present. No rales.   Chest:      Chest wall: No tenderness.   Abdominal:      General: Abdomen is flat. Bowel sounds are normal. There is no distension.      Palpations: Abdomen is soft. There is no mass.      Tenderness: There is no abdominal tenderness. There is no guarding or rebound.      Hernia: No hernia is present.   Genitourinary:     Comments: deferred  Musculoskeletal:         General: No swelling, tenderness or signs of injury. Normal range of motion.      Cervical back: Normal range of motion and neck supple. No rigidity or tenderness.   Lymphadenopathy:      Cervical: No cervical adenopathy.   Skin:     General: Skin is warm and dry.      Capillary Refill: Capillary refill takes less than 2 seconds.      Coloration: Skin is not jaundiced.      Findings: No erythema or rash.      Comments: S/p Left subclavian port removal- left chest incision   Neurological:      General: No focal deficit present.      Mental Status: She is alert and oriented to person, place, and time. Mental status is at baseline.      Cranial Nerves: No cranial  nerve deficit.      Sensory: No sensory deficit.      Motor: No weakness.      Coordination: Coordination normal.      Gait: Gait normal.      Deep Tendon Reflexes: Reflexes normal.   Psychiatric:         Mood and Affect: Mood normal.         Behavior: Behavior normal.         Thought Content: Thought content normal.         Judgment: Judgment normal.         I have reexamined the patient and the results are consistent with the previously documented exam. Cindy Ball MD     RECENT LABS:    Lab Results (last 24 hours)     Procedure Component Value Units Date/Time    Magnesium [[id number]]  (Normal) Collected: 12/12/21 1048    Specimen: Blood Updated: 12/12/21 1153     Magnesium 1.6 mg/dL      Comment: Result checked       Comprehensive Metabolic Panel [[id number]]  (Abnormal) Collected: 12/12/21 1048    Specimen: Blood Updated: 12/12/21 1150     Glucose 107 mg/dL      BUN 17 mg/dL      Creatinine 0.60 mg/dL      Sodium 136 mmol/L      Potassium 4.4 mmol/L      Chloride 99 mmol/L      CO2 25.0 mmol/L      Calcium 7.2 mg/dL      Total Protein 6.1 g/dL      Albumin 2.30 g/dL      ALT (SGPT) 14 U/L      AST (SGOT) 25 U/L      Alkaline Phosphatase 189 U/L      Total Bilirubin 0.2 mg/dL      eGFR Non African Amer 100 mL/min/1.73      Globulin 3.8 gm/dL      A/G Ratio 0.6 g/dL      BUN/Creatinine Ratio 28.3     Anion Gap 12.0 mmol/L     Narrative:      GFR Normal >60  Chronic Kidney Disease <60  Kidney Failure <15      Protime-INR [[id number]]  (Abnormal) Collected: 12/12/21 1048    Specimen: Blood Updated: 12/12/21 1137     Protime 13.0 Seconds      INR 1.19    CBC & Differential [[id number]]  (Abnormal) Collected: 12/12/21 1048    Specimen: Blood Updated: 12/12/21 1135    Narrative:      The following orders were created for panel order CBC & Differential.  Procedure                               Abnormality         Status                     ---------                               -----------         ------                      CBC Auto Differential[[id number]]        Abnormal            Final result                 Please view results for these tests on the individual orders.    CBC Auto Differential [[id number]]  (Abnormal) Collected: 12/12/21 1048    Specimen: Blood Updated: 12/12/21 1135     WBC 8.50 10*3/mm3      RBC 3.58 10*6/mm3      Hemoglobin 9.3 g/dL      Hematocrit 29.1 %      MCV 81.3 fL      MCH 26.1 pg      MCHC 32.1 g/dL      RDW 18.7 %      RDW-SD 53.4 fl      MPV 8.8 fL      Platelets 195 10*3/mm3      Neutrophil % 85.9 %      Lymphocyte % 4.4 %      Monocyte % 8.1 %      Eosinophil % 1.2 %      Basophil % 0.4 %      Neutrophils, Absolute 7.30 10*3/mm3      Lymphocytes, Absolute 0.40 10*3/mm3      Monocytes, Absolute 0.70 10*3/mm3      Eosinophils, Absolute 0.10 10*3/mm3      Basophils, Absolute 0.00 10*3/mm3      nRBC 0.0 /100 WBC     Wound Culture - Wound, Chest, Left [[id number]] Collected: 12/10/21 1148    Specimen: Wound from Chest, Left Updated: 12/12/21 1025     Wound Culture No growth at 2 days     Gram Stain Rare (1+) WBCs per low power field      No organisms seen    Blood Culture - Blood, Arm, Left [[id number]]  (Normal) Collected: 12/10/21 1414    Specimen: Blood from Arm, Left Updated: 12/11/21 1515     Blood Culture No growth at 24 hours          PENDING RESULTS:     IMAGING REVIEWED:  XR Chest 1 View    Result Date: 12/11/2021  1. Patchy bilateral airspace opacities likely representing multifocal pneumonia. No significant change from prior exam dated 12/08/2021. 2. Removal of the left-sided chest port.  Electronically Signed By-Cristian Pennington MD On:12/11/2021 5:49 PM This report was finalized on [id number] by  Cristian Pennington MD.      Assessment/Plan      ASSESSMENT:    Recurrent ovarian cancer with metastasis to brain, colon, abdominal wall and pulmonary involvement: She has had  Multiple lines of therapy in the past but most recently she is on Letrozol. Patient is currently on  Palliative  XRT to the whole  brain         Chemotherapy history:    Doxorubicin Liposomal/Carboplatin: received 16 cycles: 2/15/2019 to 6/11/2020 : life time dose/progression of disease  Topotecan weekly: received 3 cycles : 6/2020 to 12/17/2020: progression of disease  Pemetrexed: Received 3 cycles: 12/23/20 to 3/10/2021: progression of disease  Cisplatin/Gemcitabine:  Received 2 cycles: 3/12/2021 to 5/7/2021- progression of disease  Denosumab (Xgeva) given every 28 days: Received 6 cycles out of 12 cycles- 2/1/21 to 7/16/21  Letrozole (Femara): 2.5mg by mouth daily: Started 9/22/2021: active treatment     2. 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 Will continue the same. ID is following.  Patient does not want any invasive testing.  3. Coumadin toxicity: INR > 8.  Holding coumadin was given Vitamin K in ED. Monitoring INR and coumadin  Per pharmacy: INR remains subtherapeutic.  I have increased warfarin to 4 mg p.o. daily  4. Hypokalemia: replaced per primary team: on oral supplement  5. History of DVT: on anticoagulation with Coumadin- previously held due to coumadin toxicity  6. Pancytopenia: due to chemotherapy: on Procrit  7. Iron deficiency anemia: Hemoglobin 8.3, HCT 27.4, MCV 81.1, MCH 26.4  8. Hypomagnesemia: replaced per primary team  9. Right eye bacterial conjunctivitis: will order Erythromycin eye gtts.   10. Candidemia, PCR detected Candida albicans due to port: s/p port removal. Patient getting IV micafungin, IV rocephin     PLANS    1. S/P left subclavian port removal per general surgery  2. IV ABT rocephin , micafungin  3. Increase warfarin to 4 mg p.o. daily  4. Daily PT/INR  5. Blood transfusion: Administer 1 unit of PRBC's for hemoglobin <7.5  6. Monitor for bleeding  7. Continue Femara   8. Continue Erythromycin ophthalmic 0.5% gtts- give to right eye four times a day x7 days  9.  Continue supportive care  10.  Discussed  with patient       Electronically signed by Cindy Ball MD, 21, 1:04 PM EST.    Electronically signed by Cindy Ball MD, 21, 12:29 PM EST.          Electronically signed by Cindy Ball MD at 21 1304     CarlsonCarlee [email or web address] at 21 0830              [facility] Medicine Services Daily Progress Note    Patient Name: Tahira Zimmerman  : 1955  MRN: [id number]  Primary Care Physician:  Noemy Queen MD  Date of admission: 2021      Subjective      Chief Complaint: Shortness of breath      Patient Reports persistent shortness of breath but not worsening.  She denies any pain.  No fever or chills.    ROS full review of systems was performed pertinent positives and negatives noted below, positive for decreased appetite and fatigue.  Positive for shortness of breath.  Negative for fever chills.  Negative for chest pain.  Negative for lower extremity edema.      Objective      Vitals:   Temp:  [97.9 °F (36.6 °C)-98.1 °F (36.7 °C)] 98.1 °F (36.7 °C)  Heart Rate:  [66-86] 66  Resp:  [18-20] 18  BP: (123-152)/(69-87) 123/69  Flow (L/min):  [5] 5    Physical Exam     Constitutional:      Awake, alert, no acute distress  HENT:      Normocephalic and atraumatic. Nose normal. Mucous membranes are moist. Oropharynx is clear.   Eyes:      No icterus, EOM intact. Pupils are equal, round, and reactive to light.   Neck:     No LAD.  No JVD.  No thyromegaly  Cardiovascular:      Regular rate and regular rhythm.  No murmur.  No edema  Pulmonary:      Mild rhonchi bilaterally  Abdominal:      Soft, NTND.  BS + all 4 quadrants  Musculoskeletal:         No joint effusions.  No atrophy   Skin:     Warm, dry, no rashes.  No lesions.  Neurological:      Alert and oriented x3, no focal neurologic deficits could be appreciated  Psychiatric:         Normal mood and affect, normal thought process and judgment         Result Review    Result Review:  I have  personally reviewed the results from the time of this admission to 12/12/2021 08:30 EST and agree with these findings:  [x]  Laboratory  [x]  Microbiology  [x]  Radiology  []  EKG/Telemetry   []  Cardiology/Vascular   []  Pathology  []  Old records  []  Other:  Most notable findings include: A.m. labs pending, previous labs and chart reviewed    Wounds (last 24 hours)     LDA Wound     Row Name 12/11/21 1900             Wound 12/10/21 1137 Left upper chest Incision    Wound - Properties Group Placement Date: 12/10/21  -TT Placement Time: 1137  -TT Side: Left  -TT Orientation: upper  -TT Location: chest  -TT Primary Wound Type: Incision  -TT      Dressing Appearance dry; intact; no drainage  -SL      Closure Liquid skin adhesive; Approximated  -SL      Drainage Amount none  -SL      Retired Wound - Properties Group Date first assessed: 12/10/21  -TT Time first assessed: 1137  -TT Side: Left  -TT Location: chest  -TT Primary Wound Type: Incision  -TT            User Key  (r) = Recorded By, (t) = Taken By, (c) = Cosigned By    Initials Name Provider Type    TT Johnna Barnard RN Registered Nurse    Ladi Andres RN Registered Nurse                  Assessment/Plan      Brief Patient Summary:  Tahira Zimmerman is a 66 y.o. female with past medical history of metastatic ovarian CA, DVT, Coumadin toxicity, GERD, HLD, anemia, who presents to to the emergency with increasing shortness of breath and cough.  Imaging concerning for possible pneumonia and she was admitted and started on IV antibiotics.  She was also found to have Coumadin toxicity with INR greater than 8.  She was also found to have positive blood cultures with Candida.  She remains on IV antibiotics for possible pneumonia although concerns for malignant spread as etiology for symptoms and imaging also possible but patient does not want to undergo further diagnostic testing for this.  She remains on antifungal for her candidemia and her port has been  removed.  She is being followed by oncology, infectious disease, general surgery, pulmonology.  She remains short of breath and stable on 4 L nasal cannula.      budesonide, 0.5 mg, Nebulization, BID - RT  calcium 500 mg vitamin D 5 mcg (200 UT), 1 tablet, Oral, BID  cefTRIAXone, 1 g, Intravenous, Q24H  cetirizine, 10 mg, Oral, Nightly  enoxaparin, 1 mg/kg, Subcutaneous, Q12H  erythromycin, , Right Eye, 4x Daily  famotidine, 40 mg, Oral, QAM AC  folic acid, 1,000 mcg, Oral, Daily  guaiFENesin, 1,200 mg, Oral, BID  ipratropium-albuterol, 3 mL, Nebulization, 4x Daily - RT  letrozole, 2.5 mg, Oral, Daily  magnesium oxide, 400 mg, Oral, TID  micafungin (MYCAMINE) IV, 150 mg, Intravenous, Q24H  montelukast, 10 mg, Oral, Nightly  phosphorus, 250 mg, Oral, TID  potassium chloride, 20 mEq, Oral, BID With Meals  pregabalin, 100 mg, Oral, TID  senna-docusate sodium, 2 tablet, Oral, BID  sertraline, 50 mg, Oral, Nightly  sodium chloride, 3 mL, Intravenous, Q12H  triamterene-hydrochlorothiazide, 1 tablet, Oral, Daily       Pharmacy to Dose enoxaparin (LOVENOX),   Pharmacy to dose warfarin,   sodium chloride, 75 mL/hr, Last Rate: 75 mL/hr (12/12/21 0017)         Active Hospital Problems:  Active Hospital Problems    Diagnosis    • **Port-A-Cath in place    • Pneumonia due to infectious organism    • Coumadin toxicity    • Hypokalemia    • AMS (altered mental status)    • Bone metastases (HCC)    • Depression    • Brain metastases (HCC)    • Malignant neoplasm of right ovary (HCC)      Plan:     1.  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  Appreciate ID, oncology, pulmonology recommendations.  Patient reports she is not interested in aggressive diagnostic studies at this time.    2.  Candidemia with Candida albicans  -Port removed 12/10, culture from port tip  pending  -Continue antifungal per infectious disease.  Will need 2 weeks of treatment.  Discussed with case management home health arrangements    3.  Metastatic ovarian cancer  -Appreciate oncology assistance.  Prior to admission patient has been receiving palliative XRT to whole brain.  With concerns for lymphangitic carcinomatosis spread to lungs with hypoxia and shortness of breath patient may benefit from palliative care consult    4.  Coumadin toxicity  -Resolved, most recent INR 1.2, remains on treatment dose Lovenox, will discuss with hematology plans for resuming Coumadin versus long-term treatment dose Lovenox as an option    5.  Hypokalemia  -A.m. labs pending, replace per protocol    6.  Essential hypertension  -Blood pressure currently stable.  Continue home antihypertensives.    7.  Acute hypoxic respiratory failure  -Secondary to pneumonia/lymphangitic carcinomatosis, remained stable on 4 L nasal cannula, wean as tolerated    8.  Anemia of chronic disease  -A.m. labs pending, hemoglobin 8.6 yesterday, continue to monitor    DVT prophylaxis:  Medical DVT prophylaxis orders are present.    CODE STATUS:    Level Of Support Discussed With: Patient  Code Status (Patient has no pulse and is not breathing): No CPR (Do Not Attempt to Resuscitate)  Medical Interventions (Patient has pulse or is breathing): Full Support      Disposition:  I expect patient to be discharged next 1 to 2 days pending medical stability.  Will need home health for IV antifungals.    This patient has been examined wearing appropriate Personal Protective Equipment  12/12/21      Electronically signed by Carlee Carlson DO, 12/12/21, 08:30 EST.  [facility], [place], operated by [facility] Hospitalist Team               Electronically signed by Carlee Carlson DO at 12/12/21 0845     Cony Shelby MD at 12/11/21 1830          Infectious Diseases Progress Note      LOS: 3 days   Patient Care Team:  Noemy Queen MD as PCP - General (Family Medicine)  Cindy Ball  MD Angie as Consulting Physician (Hematology and Oncology)    Chief Complaint: Shortness of breath    Subjective       The patient has been afebrile for the last 24 hours.  The patient is currently on 5 L oxygen via nasal cannula.  The patient denied having any new complaints today.      Review of Systems:   Review of Systems   Constitutional: Positive for fatigue.   HENT: Negative.    Eyes: Negative.    Respiratory: Positive for shortness of breath.    Cardiovascular: Negative.    Gastrointestinal: Negative.    Endocrine: Negative.    Genitourinary: Negative.    Musculoskeletal: Negative.    Skin: Negative.    Neurological: Negative.    Psychiatric/Behavioral: Negative.    All other systems reviewed and are negative.       Objective     Vital Signs  Temp:  [97.8 °F (36.6 °C)-98.1 °F (36.7 °C)] 98.1 °F (36.7 °C)  Heart Rate:  [78-92] 80  Resp:  [18-20] 18  BP: (117-134)/(75-81) 134/81    Physical Exam:  Physical Exam  Vitals and nursing note reviewed.   Constitutional:       General: She is not in acute distress.     Appearance: Normal appearance. She is well-developed and normal weight. She is not diaphoretic.   HENT:      Head: Normocephalic and atraumatic.   Eyes:      General: No scleral icterus.     Extraocular Movements: Extraocular movements intact.      Conjunctiva/sclera: Conjunctivae normal.      Pupils: Pupils are equal, round, and reactive to light.   Cardiovascular:      Rate and Rhythm: Normal rate and regular rhythm.      Heart sounds: Normal heart sounds, S1 normal and S2 normal. No murmur heard.      Pulmonary:      Effort: Pulmonary effort is normal. No respiratory distress.      Breath sounds: No stridor. Rales present. No wheezing.   Chest:      Chest wall: No tenderness.   Abdominal:      General: Bowel sounds are normal. There is no distension.      Palpations: Abdomen is soft. There is no mass.      Tenderness: There is no abdominal tenderness. There is no guarding.   Musculoskeletal:          General: No swelling, tenderness or deformity. Normal range of motion.      Cervical back: Neck supple.   Skin:     General: Skin is warm and dry.      Coloration: Skin is not pale.      Findings: No bruising, erythema or rash.      Comments: Multiple skin nodules noticed on the head and 1 large nodule in the left anterior chest     Port has been removed   Neurological:      General: No focal deficit present.      Mental Status: She is alert and oriented to person, place, and time. Mental status is at baseline.      Cranial Nerves: No cranial nerve deficit.   Psychiatric:         Mood and Affect: Mood normal.          Results Review:    I have reviewed all clinical data, test, lab, and imaging results.     Radiology  XR Chest 1 View    Result Date: 12/11/2021  EXAMINATION: XR CHEST 1 VW-  DATE OF EXAM: 12/11/2021 5:08 PM  INDICATION: SOB; J18.9-Pneumonia, unspecified organism; R06.02-Shortness of breath; R93.0-Abnormal findings on diagnostic imaging of skull and head, not elsewhere classified; R79.1-Abnormal coagulation profile; Z95.828-Presence of other vascular implants and grafts.  COMPARISON: Chest radiograph dated 12/08/2021  TECHNIQUE: Portable AP view of the chest was obtained.  FINDINGS: The cardiomediastinal silhouette is within normal limits. Pulmonary vascularity is unremarkable. There are patchy areas of airspace opacity throughout both lungs likely representing multifocal pneumonia. There is no pleural effusion or pneumothorax. There are degenerative changes of the thoracic spine. There is no free air under the diaphragm.      1. Patchy bilateral airspace opacities likely representing multifocal pneumonia. No significant change from prior exam dated 12/08/2021. 2. Removal of the left-sided chest port.  Electronically Signed By-Cristian Pennington MD On:12/11/2021 5:49 PM This report was finalized on [id number] by  Cristian Pennington MD.      Cardiology    Laboratory    Results from last 7 days   Lab  Units 12/11/21  0552 12/10/21  0537 12/09/21  0529 12/08/21  1910   WBC 10*3/mm3 7.60 7.40 5.90 4.20   HEMOGLOBIN g/dL 8.6* 8.3* 8.8* 9.6*   HEMATOCRIT % 26.6* 25.6* 27.4* 29.6*   PLATELETS 10*3/mm3 183 203 232 222     Results from last 7 days   Lab Units 12/11/21  0552 12/10/21  0537 12/09/21  0529 12/08/21  1910   SODIUM mmol/L 136 136 134* 134*   POTASSIUM mmol/L 4.4 3.9 4.1 3.3*   CHLORIDE mmol/L 101 101 99 97*   CO2 mmol/L 25.0 25.0 26.0 25.0   BUN mg/dL 21 24* 29* 31*   CREATININE mg/dL 0.71 0.73 0.95 1.01*   GLUCOSE mg/dL 104* 86 120* 155*   ALBUMIN g/dL  --  2.40*  --  2.60*   BILIRUBIN mg/dL  --  0.2  --  0.3   ALK PHOS U/L  --  181*  --  209*   AST (SGOT) U/L  --  26  --  22   ALT (SGPT) U/L  --  13  --  10   CALCIUM mg/dL 6.7* 6.7* 6.8* 7.0*                 Microbiology   Microbiology Results (last 10 days)     Procedure Component Value - Date/Time    Blood Culture - Blood, Arm, Left [[id number]]  (Normal) Collected: 12/10/21 1414    Lab Status: Preliminary result Specimen: Blood from Arm, Left Updated: 12/11/21 1515     Blood Culture No growth at 24 hours    Wound Culture - Wound, Chest, Left [[id number]] Collected: 12/10/21 1148    Lab Status: Preliminary result Specimen: Wound from Chest, Left Updated: 12/11/21 1127     Wound Culture No growth     Gram Stain Rare (1+) WBCs per low power field      No organisms seen    Blood Culture - Blood, Chest, Left [[id number]]  (Abnormal) Collected: 12/08/21 1946    Lab Status: Final result Specimen: Blood from Chest, Left Updated: 12/11/21 0648     Blood Culture Candida albicans     Comment: Infectious disease consultation is highly recommended to rule out distant foci of infection.        Isolated from Aerobic Bottle     Gram Stain Aerobic Bottle Yeast    Narrative:      Refer to previous blood culture collected on 12/8/2021 1910 for MICs    Blood Culture - Blood, Chest, Left [[id number]]  (Abnormal)  (Susceptibility) Collected: 12/08/21 1910    Lab Status: Final  result Specimen: Blood from Chest, Left Updated: 12/11/21 0648     Blood Culture Candida albicans     Comment: Infectious disease consultation is highly recommended to rule out distant foci of infection.        Isolated from Aerobic and Anaerobic Bottles     Gram Stain Aerobic Bottle Yeast      Anaerobic Bottle Yeast    Susceptibility      Candida albicans     OSORIO     Fluconazole Susceptible     Micafungin Susceptible                         Respiratory Panel PCR w/COVID-19(SARS-CoV-2) JAMAL/ROLF/ABENA/PAD/COR/MAD/MICHAEL In-House, NP Swab in UTM/VTM, 3-4 HR TAT - Swab, Nasopharynx [[id number]]  (Normal) Collected: 12/08/21 1910    Lab Status: Final result Specimen: Swab from Nasopharynx Updated: 12/08/21 2029     ADENOVIRUS, PCR Not Detected     Coronavirus 229E Not Detected     Coronavirus HKU1 Not Detected     Coronavirus NL63 Not Detected     Coronavirus OC43 Not Detected     COVID19 Not Detected     Human Metapneumovirus Not Detected     Human Rhinovirus/Enterovirus Not Detected     Influenza A PCR Not Detected     Influenza B PCR Not Detected     Parainfluenza Virus 1 Not Detected     Parainfluenza Virus 2 Not Detected     Parainfluenza Virus 3 Not Detected     Parainfluenza Virus 4 Not Detected     RSV, PCR Not Detected     Bordetella pertussis pcr Not Detected     Bordetella parapertussis PCR Not Detected     Chlamydophila pneumoniae PCR Not Detected     Mycoplasma pneumo by PCR Not Detected    Narrative:      In the setting of a positive respiratory panel with a viral infection PLUS a negative procalcitonin without other underlying concern for bacterial infection, consider observing off antibiotics or discontinuation of antibiotics and continue supportive care. If the respiratory panel is positive for atypical bacterial infection (Bordetella pertussis, Chlamydophila pneumoniae, or Mycoplasma pneumoniae), consider antibiotic de-escalation to target atypical bacterial infection.    Blood Culture ID, PCR - Blood, Chest,  Left [[id number]]  (Abnormal) Collected: 12/08/21 1910    Lab Status: Final result Specimen: Blood from Chest, Left Updated: 12/09/21 1422     BCID, PCR Candida albicans. Identification by BCID2 PCR     BOTTLE TYPE Aerobic Bottle    Narrative:      Infectious disease consultation is highly recommended to rule out distant foci of infection.          Medication Review:       Schedule Meds  budesonide, 0.5 mg, Nebulization, BID - RT  calcium 500 mg vitamin D 5 mcg (200 UT), 1 tablet, Oral, BID  cefTRIAXone, 1 g, Intravenous, Q24H  cetirizine, 10 mg, Oral, Nightly  enoxaparin, 1 mg/kg, Subcutaneous, Q12H  erythromycin, , Right Eye, 4x Daily  famotidine, 40 mg, Oral, QAM AC  folic acid, 1,000 mcg, Oral, Daily  guaiFENesin, 1,200 mg, Oral, BID  ipratropium-albuterol, 3 mL, Nebulization, 4x Daily - RT  letrozole, 2.5 mg, Oral, Daily  magnesium oxide, 400 mg, Oral, TID  micafungin (MYCAMINE) IV, 150 mg, Intravenous, Q24H  montelukast, 10 mg, Oral, Nightly  phosphorus, 250 mg, Oral, TID  potassium chloride, 20 mEq, Oral, BID With Meals  pregabalin, 100 mg, Oral, TID  senna-docusate sodium, 2 tablet, Oral, BID  sertraline, 50 mg, Oral, Nightly  sodium chloride, 3 mL, Intravenous, Q12H  triamterene-hydrochlorothiazide, 1 tablet, Oral, Daily        Infusion Meds  Pharmacy to Dose enoxaparin (LOVENOX),   Pharmacy to dose warfarin,   sodium chloride, 75 mL/hr, Last Rate: 75 mL/hr (12/10/21 0915)        PRN Meds  •  acetaminophen **OR** acetaminophen **OR** acetaminophen  •  albuterol sulfate HFA  •  senna-docusate sodium **AND** polyethylene glycol **AND** bisacodyl **AND** bisacodyl  •  ipratropium-albuterol  •  magnesium sulfate **OR** magnesium sulfate in D5W 1g/100mL (PREMIX) **OR** magnesium sulfate  •  melatonin  •  ondansetron **OR** ondansetron  •  oxyCODONE  •  Pharmacy to Dose enoxaparin (LOVENOX)  •  Pharmacy to dose warfarin  •  potassium chloride **OR** potassium chloride **OR** potassium chloride  •  sodium  chloride  •  sodium chloride  •  temazepam        Assessment/Plan       Antimicrobial Therapy   1.  Micafungin        2.  Rocephin        3.        4.        5.            Assessment     Candidemia with Candida albicans.  Most likely secondary to port infection  -Port was removed on 12/10/2021-tip culture is pending     Dyspnea and hypoxia.  Chest x-ray showed interstitial lung infiltrates.  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  Patient also have multiple skin nodules concerning for metastasis.  Patient was on oral chemotherapy prior to admission     The patient is s/p port placement in the past     Plan     Continue IV micafungin 150 mg daily patient will need 2 weeks of treatment  Continue IV Rocephin for now  Pulmonary service was consulted to evaluate patient for possible bronchoscopy  Continue supportive care  A.m. labs  Long-term prognosis is poor secondary to advanced and metastatic ovarian cancer       Cony Shelby MD  21  18:30 EST    Note is dictated utilizing voice recognition software/Dragon    Electronically signed by Cony Shelby MD at 21 1831     Jaky Pettit MD at 21 1645              [facility] Medicine Services Daily Progress Note    Patient Name: Tahira Zimmerman  : 1955  MRN: [id number]  Primary Care Physician:  Noemy Queen MD  Date of admission: 2021      Subjective      Chief Complaint: Cough with shortness of breath and pneumonia.    12/10/2021  Overall the patient is feeling better.  No new issues.  Did have an uneventful trip to the operating room for removal of her  port.    12/11/2021  Today the patient reports feeling somewhat better.  The patient tolerated the removal of the port well.  The patient is currently on a Lovenox bridge while she gets reanticoagulated.    Review of Systems   Constitutional: Positive for decreased appetite, malaise/fatigue and weight loss.   HENT: Negative.    Eyes: Negative.    Cardiovascular: Negative.    Respiratory: Positive for cough and sputum production.         She reports her breathing is somewhat improved.   Endocrine: Negative.    Hematologic/Lymphatic: Negative.    Skin: Negative.    Musculoskeletal: Positive for muscle weakness.   Gastrointestinal: Positive for dysphagia.   Genitourinary: Negative.    Neurological: Negative.    Psychiatric/Behavioral: Negative.    Allergic/Immunologic: Negative.    All other systems reviewed and are negative.         Objective      Vitals:   Temp:  [97.8 °F (36.6 °C)-98.1 °F (36.7 °C)] 98.1 °F (36.7 °C)  Heart Rate:  [78-92] 80  Resp:  [18-20] 18  BP: (117-134)/(75-81) 134/81  Flow (L/min):  [5] 5    Physical Exam  Vitals and nursing note reviewed.   Constitutional:       General: She is not in acute distress.     Appearance: Normal appearance. She is well-developed. She is not ill-appearing, toxic-appearing or diaphoretic.      Comments: The patient is very ill in her appearance. She continues to have some issues with eating but overall she seems to be improving.   HENT:      Head: Normocephalic and atraumatic.      Right Ear: Ear canal and external ear normal.      Left Ear: Ear canal and external ear normal.      Nose: Nose normal. No congestion or rhinorrhea.      Mouth/Throat:      Mouth: Mucous membranes are moist.      Pharynx: No oropharyngeal exudate.   Eyes:      General: No scleral icterus.        Right eye: No discharge.         Left eye: No discharge.      Extraocular Movements: Extraocular movements intact.      Conjunctiva/sclera: Conjunctivae normal.      Pupils: Pupils are equal,  round, and reactive to light.   Neck:      Thyroid: No thyromegaly.      Vascular: No carotid bruit or JVD.      Trachea: No tracheal deviation.   Cardiovascular:      Rate and Rhythm: Normal rate and regular rhythm.      Pulses: Normal pulses.      Heart sounds: Normal heart sounds. No murmur heard.  No friction rub. No gallop.    Pulmonary:      Effort: Pulmonary effort is normal. No respiratory distress.      Breath sounds: No stridor. No wheezing, rhonchi or rales.      Comments: Few scattered rhonchi which improve with coughing.  Chest:      Chest wall: No tenderness.   Abdominal:      General: Bowel sounds are normal. There is no distension.      Palpations: Abdomen is soft. There is no mass.      Tenderness: There is no abdominal tenderness. There is no guarding or rebound.      Hernia: No hernia is present.   Musculoskeletal:         General: No swelling, tenderness, deformity or signs of injury. Normal range of motion.      Cervical back: Normal range of motion and neck supple. No rigidity. No muscular tenderness.      Right lower leg: No edema.      Left lower leg: No edema.   Lymphadenopathy:      Cervical: No cervical adenopathy.   Skin:     General: Skin is warm and dry.      Coloration: Skin is not jaundiced or pale.      Findings: No bruising, erythema or rash.   Neurological:      General: No focal deficit present.      Mental Status: She is alert and oriented to person, place, and time. Mental status is at baseline.      Cranial Nerves: No cranial nerve deficit.      Sensory: No sensory deficit.      Motor: No weakness or abnormal muscle tone.      Coordination: Coordination normal.   Psychiatric:         Mood and Affect: Mood normal.         Behavior: Behavior normal.         Thought Content: Thought content normal.         Judgment: Judgment normal.             Result Review    Result Review:  I have personally reviewed the results from the time of this admission to 12/11/2021 16:45 EST and agree  with these findings:  [x]  Laboratory  [x]  Microbiology  [x]  Radiology  []  EKG/Telemetry   []  Cardiology/Vascular   []  Pathology  []  Old records  [x]  Other:  Most notable findings include: Speech therapy report    Wounds (last 24 hours)     LDA Wound     Row Name 12/11/21 0730 12/11/21 0355 12/11/21 0151       Wound 12/10/21 1137 Left upper chest Incision    Wound - Properties Group Placement Date: 12/10/21  -TT Placement Time: 1137  -TT Side: Left  -TT Orientation: upper  -TT Location: chest  -TT Primary Wound Type: Incision  -TT    Closure Liquid skin adhesive; Approximated  -RP Liquid skin adhesive; Approximated  -LS Liquid skin adhesive; Approximated  -LS    Drainage Amount none  -RP none  -LS none  -LS    Retired Wound - Properties Group Date first assessed: 12/10/21  -TT Time first assessed: 1137  -TT Side: Left  -TT Location: chest  -TT Primary Wound Type: Incision  -TT    Row Name 12/10/21 1901             Wound 12/10/21 1137 Left upper chest Incision    Wound - Properties Group Placement Date: 12/10/21  -TT Placement Time: 1137  -TT Side: Left  -TT Orientation: upper  -TT Location: chest  -TT Primary Wound Type: Incision  -TT      Dressing Appearance dry; intact; no drainage  -BD      Closure Liquid skin adhesive; Approximated  -BD      Drainage Amount none  -BD      Retired Wound - Properties Group Date first assessed: 12/10/21  -TT Time first assessed: 1137  -TT Side: Left  -TT Location: chest  -TT Primary Wound Type: Incision  -TT            User Key  (r) = Recorded By, (t) = Taken By, (c) = Cosigned By    Initials Name Provider Type    TT Johnna Barnard RN Registered Nurse    Joanie Bailey RN Registered Nurse    Chelsea Hernandez RN Registered Nurse    Regla Kasper RN Registered Nurse                  Assessment/Plan      Brief Patient Summary:  Tahira Zimmerman is a 66 y.o. female who has numerous medical problems which include metastatic ovarian cancer which is widely  metastatic, hypertension, coagulopathy related to Coumadin dosing with INR of eight, history of DVTs. The patient presented for increasing issues with swallowing.      budesonide, 0.5 mg, Nebulization, BID - RT  calcium 500 mg vitamin D 5 mcg (200 UT), 1 tablet, Oral, BID  cefTRIAXone, 1 g, Intravenous, Q24H  cetirizine, 10 mg, Oral, Nightly  enoxaparin, 1 mg/kg, Subcutaneous, Q12H  erythromycin, , Right Eye, 4x Daily  famotidine, 40 mg, Oral, QAM AC  folic acid, 1,000 mcg, Oral, Daily  guaiFENesin, 1,200 mg, Oral, BID  ipratropium-albuterol, 3 mL, Nebulization, 4x Daily - RT  letrozole, 2.5 mg, Oral, Daily  magnesium oxide, 400 mg, Oral, TID  micafungin (MYCAMINE) IV, 150 mg, Intravenous, Q24H  montelukast, 10 mg, Oral, Nightly  phosphorus, 250 mg, Oral, TID  potassium chloride, 20 mEq, Oral, BID With Meals  pregabalin, 100 mg, Oral, TID  senna-docusate sodium, 2 tablet, Oral, BID  sertraline, 50 mg, Oral, Nightly  sodium chloride, 3 mL, Intravenous, Q12H  triamterene-hydrochlorothiazide, 1 tablet, Oral, Daily  warfarin, 3 mg, Oral, Once       Pharmacy to Dose enoxaparin (LOVENOX),   Pharmacy to dose warfarin,   sodium chloride, 75 mL/hr, Last Rate: 75 mL/hr (12/10/21 0915)         Active Hospital Problems:  Active Hospital Problems    Diagnosis    • **Port-A-Cath in place    • Pneumonia due to infectious organism    • Coumadin toxicity    • Hypokalemia    • AMS (altered mental status)    • Bone metastases (HCC)    • Depression    • Brain metastases (HCC)    • Malignant neoplasm of right ovary (HCC)      Plan:   Pneumonia due to infectious organism, COVID-19 negative, chest x-ray per radiology shows bilateral pulmonary infiltrates, WBC not elevated, continue IV ceftriaxone and IV azithromycin until blood cultures resulted, DuoNebs every 4 hours as needed and albuterol every 6 hours as needed, stable on home 6 L oxygen via nasal cannula. Will follow closely to see if she improves on the ceftriaxone. Discontinue the  a azithromycin as her viral panel was negative. She is allergic to penicillins therefore will not change her to something like Zosyn which might help if this were aspiration. We will follow her closely and if need be start Cleocin.  Pulmonary medicine is considering bronchoscopy if she does not start to show improvement.     Coumadin toxicity INR greater than 8 with past medical history of DVT, hold home Coumadin for now given vitamin K in ED, INR daily pharmacy to dose warfarin to INR. Her INR is down to 1.24  this morning. We will continue to follow and restart with pharmacy's help.  She is currently on a Lovenox bridge.     Altered mental status?  Patient appears awake and alert and answers appropriately, she denies confusion, CT head negative for acute changes, mets to brain known     Hypokalemia mild potassium 3.3, potassium protocol in place repeat BNP in a.m. magnesium in a.m.     Ovarian  cancer with mets to bone brain and abdominal, followed by  of oncology, consulted Home meds unverified at this time reorder pending verification pharmacy       -I did speak with the patient about whether or not she was interested in having a lung biopsy to see if the lesions and disease in her lung is related to her cancer and that has metastasized.  She actually at this point is really more concerned about quality of life than quantity and understands that she has serious disease and told me that she was really not interested in pursuing aggressive/invasive procedures to further delineate the extent of her disease.    Hypertension, controlled on home meds unverified at this time reorder pending verification from pharmacy monitor BP     Anxiety depression, home meds unverified at this time reorder pending verification from pharmacy     Poor appetite, weight loss likely secondary to malignancy, boost 3 times daily ordered, dietary consulted     The patient continues on IV micafungin and will need this for total of 14  days intravenously.  Likely need case management to assist in setting the patient up with a way to receive this that meets her needs.    DVT prophylaxis:  Medical DVT prophylaxis orders are present.    CODE STATUS:    Level Of Support Discussed With: Patient  Code Status (Patient has no pulse and is not breathing): No CPR (Do Not Attempt to Resuscitate)  Medical Interventions (Patient has pulse or is breathing): Full Support      Disposition:  I expect patient to be discharged in 2 to 3 days if she improves.    This patient has been examined wearing appropriate Personal Protective Equipment and discussed with hospital infection control department. 12/11/21      Electronically signed by Jaky Pettit MD, 12/11/21, 16:45 EST.  [facility] Hospitalist Team             Electronically signed by Jaky Pettit MD at 12/11/21 1529

## 2021-12-12 NOTE — PLAN OF CARE
Goal Outcome Evaluation:   Patient currently in bed with intermittent nausea- zofran given once with relief.  Will continue to monitor patient.

## 2021-12-12 NOTE — CASE MANAGEMENT/SOCIAL WORK
Continued Stay Note  [facility]     Patient Name: Tahira Zimmerman  MRN: [id number]  Today's Date: 12/12/2021    Admit Date: 12/8/2021     Discharge Plan     Row Name 12/12/21 1609       Plan    Plan PT recommending inpatient rehab. Referral made to [facility]; pending acceptance. No precert needed. Anticipate PASRR to be done by facility.    Patient/Family in Agreement with Plan yes    Plan Comments PT recommended inpatient rehab. Spoke with patient about inpatient rehab choices. Patient wanted to stay in [place] Choices give. Patient chose [facility]. Referral placed; acceptance pending.              Phone communication or documentation only - no physical contact with patient or family.    Michelle Pelaez [place] [address]  Office: [phone]  Fax: [phone]  [email or web address]

## 2021-12-12 NOTE — PROGRESS NOTES
[facility] Medicine Services Daily Progress Note    Patient Name: Tahira Zimmerman  : 1955  MRN: [id number]  Primary Care Physician:  Noemy Queen MD  Date of admission: 2021      Subjective      Chief Complaint: Shortness of breath      Patient Reports persistent shortness of breath but not worsening.  She denies any pain.  No fever or chills.    ROS full review of systems was performed pertinent positives and negatives noted below, positive for decreased appetite and fatigue.  Positive for shortness of breath.  Negative for fever chills.  Negative for chest pain.  Negative for lower extremity edema.      Objective      Vitals:   Temp:  [97.9 °F (36.6 °C)-98.1 °F (36.7 °C)] 98.1 °F (36.7 °C)  Heart Rate:  [66-86] 66  Resp:  [18-20] 18  BP: (123-152)/(69-87) 123/69  Flow (L/min):  [5] 5    Physical Exam     Constitutional:      Awake, alert, no acute distress  HENT:      Normocephalic and atraumatic. Nose normal. Mucous membranes are moist. Oropharynx is clear.   Eyes:      No icterus, EOM intact. Pupils are equal, round, and reactive to light.   Neck:     No LAD.  No JVD.  No thyromegaly  Cardiovascular:      Regular rate and regular rhythm.  No murmur.  No edema  Pulmonary:      Mild rhonchi bilaterally  Abdominal:      Soft, NTND.  BS + all 4 quadrants  Musculoskeletal:         No joint effusions.  No atrophy   Skin:     Warm, dry, no rashes.  No lesions.  Neurological:      Alert and oriented x3, no focal neurologic deficits could be appreciated  Psychiatric:         Normal mood and affect, normal thought process and judgment         Result Review    Result Review:  I have personally reviewed the results from the time of this admission to 2021 08:30 EST and agree with these findings:  [x]  Laboratory  [x]  Microbiology  [x]  Radiology  []  EKG/Telemetry   []  Cardiology/Vascular   []  Pathology  []  Old records  []  Other:  Most notable findings include: A.m.  labs pending, previous labs and chart reviewed    Wounds (last 24 hours)     LDA Wound     Row Name 12/11/21 1900             Wound 12/10/21 1137 Left upper chest Incision    Wound - Properties Group Placement Date: 12/10/21  -TT Placement Time: 1137 -TT Side: Left  -TT Orientation: upper  -TT Location: chest  -TT Primary Wound Type: Incision  -TT      Dressing Appearance dry; intact; no drainage  -SL      Closure Liquid skin adhesive; Approximated  -SL      Drainage Amount none  -SL      Retired Wound - Properties Group Date first assessed: 12/10/21  -TT Time first assessed: 1137  -TT Side: Left  -TT Location: chest  -TT Primary Wound Type: Incision  -TT            User Key  (r) = Recorded By, (t) = Taken By, (c) = Cosigned By    Initials Name Provider Type    TT Johnna Barnard RN Registered Nurse    Ladi Andres RN Registered Nurse                  Assessment/Plan      Brief Patient Summary:  Tahira Zimmerman is a 66 y.o. female with past medical history of metastatic ovarian CA, DVT, Coumadin toxicity, GERD, HLD, anemia, who presents to to the emergency with increasing shortness of breath and cough.  Imaging concerning for possible pneumonia and she was admitted and started on IV antibiotics.  She was also found to have Coumadin toxicity with INR greater than 8.  She was also found to have positive blood cultures with Candida.  She remains on IV antibiotics for possible pneumonia although concerns for malignant spread as etiology for symptoms and imaging also possible but patient does not want to undergo further diagnostic testing for this.  She remains on antifungal for her candidemia and her port has been removed.  She is being followed by oncology, infectious disease, general surgery, pulmonology.  She remains short of breath and stable on 4 L nasal cannula.      budesonide, 0.5 mg, Nebulization, BID - RT  calcium 500 mg vitamin D 5 mcg (200 UT), 1 tablet, Oral, BID  cefTRIAXone, 1 g,  Intravenous, Q24H  cetirizine, 10 mg, Oral, Nightly  enoxaparin, 1 mg/kg, Subcutaneous, Q12H  erythromycin, , Right Eye, 4x Daily  famotidine, 40 mg, Oral, QAM AC  folic acid, 1,000 mcg, Oral, Daily  guaiFENesin, 1,200 mg, Oral, BID  ipratropium-albuterol, 3 mL, Nebulization, 4x Daily - RT  letrozole, 2.5 mg, Oral, Daily  magnesium oxide, 400 mg, Oral, TID  micafungin (MYCAMINE) IV, 150 mg, Intravenous, Q24H  montelukast, 10 mg, Oral, Nightly  phosphorus, 250 mg, Oral, TID  potassium chloride, 20 mEq, Oral, BID With Meals  pregabalin, 100 mg, Oral, TID  senna-docusate sodium, 2 tablet, Oral, BID  sertraline, 50 mg, Oral, Nightly  sodium chloride, 3 mL, Intravenous, Q12H  triamterene-hydrochlorothiazide, 1 tablet, Oral, Daily       Pharmacy to Dose enoxaparin (LOVENOX),   Pharmacy to dose warfarin,   sodium chloride, 75 mL/hr, Last Rate: 75 mL/hr (12/12/21 0017)         Active Hospital Problems:  Active Hospital Problems    Diagnosis    • **Port-A-Cath in place    • Pneumonia due to infectious organism    • Coumadin toxicity    • Hypokalemia    • AMS (altered mental status)    • Bone metastases (HCC)    • Depression    • Brain metastases (HCC)    • Malignant neoplasm of right ovary (HCC)      Plan:     1.  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  Appreciate ID, oncology, pulmonology recommendations.  Patient reports she is not interested in aggressive diagnostic studies at this time.    2.  Candidemia with Candida albicans  -Port removed 12/10, culture from port tip pending  -Continue antifungal per infectious disease.  Will need 2 weeks of treatment.  Discussed with case management home health arrangements    3.  Metastatic ovarian cancer  -Appreciate oncology assistance.  Prior to admission patient has been receiving palliative XRT to whole  brain.  With concerns for lymphangitic carcinomatosis spread to lungs with hypoxia and shortness of breath patient may benefit from palliative care consult    4.  Coumadin toxicity  -Resolved, most recent INR 1.2, remains on treatment dose Lovenox, will discuss with hematology plans for resuming Coumadin versus long-term treatment dose Lovenox as an option    5.  Hypokalemia  -A.m. labs pending, replace per protocol    6.  Essential hypertension  -Blood pressure currently stable.  Continue home antihypertensives.    7.  Acute hypoxic respiratory failure  -Secondary to pneumonia/lymphangitic carcinomatosis, remained stable on 4 L nasal cannula, wean as tolerated    8.  Anemia of chronic disease  -A.m. labs pending, hemoglobin 8.6 yesterday, continue to monitor    DVT prophylaxis:  Medical DVT prophylaxis orders are present.    CODE STATUS:    Level Of Support Discussed With: Patient  Code Status (Patient has no pulse and is not breathing): No CPR (Do Not Attempt to Resuscitate)  Medical Interventions (Patient has pulse or is breathing): Full Support      Disposition:  I expect patient to be discharged next 1 to 2 days pending medical stability.  Will need home health for IV antifungals.    This patient has been examined wearing appropriate Personal Protective Equipment  12/12/21      Electronically signed by Carlee Carlson DO, 12/12/21, 08:30 EST.  [facility]

## 2021-12-12 NOTE — PROGRESS NOTES
Hematology/Oncology Inpatient Progress Note    PATIENT NAME: Tahira Zimmerman  : 1955  MRN: [id number]    CHIEF COMPLAINT: Shortness of breath    HISTORY OF PRESENT ILLNESS: Tahira Zimmerman is a 66 y.o. female who presented to [facility] on 2021 with complaints of shortness of breath for the past several days.  Shortness of breath is accompanied with productive cough with yellowish-white sputurm.  Denies fever, chills, diaphoresis, lightheadedness, altered mental status or confusion.  Patient reports being on oxygen at home at 6 to 8 LPM per nasal cannula.  She is currently receiving oral chemotherapy for Metastatic ovarian cancer with mets to brain, bone and various abdominal organs.       21  Hematology/Oncology was consulted for Ovarian cancer with mets to brain, bone and abdomen.  Patient is well known to our clinic and follows care with Dr.Rosaline Ball.  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  She was treated with surgery and then postoperatively with adjuvant chemotherapy consisting of Taxol and carboplatin.  Six months later, she had recurrence of disease intra-abdominally between the rectum and vagina.  She was treated with intraabdominal peritoneal chemotherapy and has been free of disease since that time until 2013 when she had recurrence.  She now has recurrent ovarian cancer with metastasis to brain, colon, abdominal wall and pulmonary involvement.  She was on Carboplatin and Doxil, Taxol, Carbo, Taxotere, single agent Gemzar, norpramin and then was on Doxil and Carboplatin.  Doxil was discontinued due to approaching lifetime dosing.  Patient had progression of disease on single agent topotecan, Alimta and combination chemotherapy with Gemzar plus cisplatin.  CT abdomen and pelvis from 10/20/21 showed progression of metastatic disease within the chest, abdomen and pelvis.   CT head without contrast done 12/8/21 revealed intracranial calcifications which could reflect sequela to treated brain mets.  There is sclerotic area involving the clivus which could relate to known metastatic disease.  There are some sclerotic areas involving the calvarium near the convexity of the head.  Patient is currently on oral Letrozole with her course of brain radiation to the whole brain.       Chemotherapy history:    Doxorubicin Liposomal/Carboplatin: received 16 cycles: 2/15/2019 to 6/11/2020 : life time dose/progression of disease  Topotecan weekly: received 3 cycles : 6/2020 to 12/17/2020: progression of disease  Pemetrexed: Received 3 cycles: 12/23/20 to 3/10/2021: progression of disease  Cisplatin/Gemcitabine:  Received 2 cycles: 3/12/2021 to 5/7/2021- progression of disease  Denosumab (Xgeva) given every 28 days: Received 6 cycles out of 12 cycles- 2/1/21 to 7/16/21  Letrozole (Femara): 2.5mg by mouth daily: Started 9/22/2021: active treatment     She  has a past medical history of Anemia (2013), Cervical disc disorder (2013), Clotting disorder (HCC) (2013), Colon polyp (2013), Deep vein thrombosis (HCC) (2013), Diverticulosis (2013), GERD (gastroesophageal reflux disease) (2016), HL (hearing loss) (2016), Hyperlipidemia (2013), Hypertension, Joint pain (2013), Low back pain (2013), Neuromuscular disorder (HCC) (2015), Osteopenia (2014), Osteoporosis, Ovarian cancer (HCC), Ovarian cancer on left (HCC) (1995), Pneumonia (2010), Spinal stenosis (2013), and Urinary tract infection (2013).     PCP: Noemy Queen MD       I have reviewed and confirmed the accuracy of the patient's history: Chief complaint, HPI, ROS and Subjective as entered by the MA/LPN/RN. Cindy Ball MD 12/12/21     12/10/2021:         History of present illness was reviewed and is unchanged from the previous visit. 12/10/21      Subjective      Patient has no new issues. Slowly improving.  Appetite has also  improved    ROS:    Review of Systems   Constitutional: Positive for fatigue. Negative for activity change, appetite change, chills, diaphoresis, fever and unexpected weight change.   HENT: Negative for congestion, dental problem, ear discharge, ear pain, facial swelling, hearing loss, mouth sores, nosebleeds, sore throat, tinnitus, trouble swallowing and voice change.    Eyes: Negative for photophobia and visual disturbance.   Respiratory: Positive for cough and shortness of breath. Negative for choking, chest tightness and wheezing.    Cardiovascular: Negative for chest pain, palpitations and leg swelling.   Gastrointestinal: Negative for abdominal distention, abdominal pain, constipation, diarrhea, nausea and vomiting.   Endocrine: Negative.    Genitourinary: Negative for decreased urine volume, difficulty urinating, dysuria, flank pain, frequency, hematuria and urgency.   Musculoskeletal: Negative for back pain, gait problem, joint swelling, myalgias, neck pain and neck stiffness.   Skin: Negative for color change, rash and wound.   Neurological: Positive for weakness. Negative for dizziness, tremors, syncope, speech difficulty, numbness and headaches.   Hematological: Negative.    Psychiatric/Behavioral: Negative.         MEDICATIONS:      Scheduled Meds:  budesonide, 0.5 mg, Nebulization, BID - RT  calcium 500 mg vitamin D 5 mcg (200 UT), 1 tablet, Oral, BID  cefTRIAXone, 1 g, Intravenous, Q24H  cetirizine, 10 mg, Oral, Nightly  enoxaparin, 1 mg/kg, Subcutaneous, Q12H  erythromycin, , Right Eye, 4x Daily  famotidine, 40 mg, Oral, QAM AC  folic acid, 1,000 mcg, Oral, Daily  guaiFENesin, 1,200 mg, Oral, BID  ipratropium-albuterol, 3 mL, Nebulization, 4x Daily - RT  letrozole, 2.5 mg, Oral, Daily  magnesium oxide, 400 mg, Oral, TID  micafungin (MYCAMINE) IV, 150 mg, Intravenous, Q24H  montelukast, 10 mg, Oral, Nightly  phosphorus, 250 mg, Oral, TID  potassium chloride, 20 mEq, Oral, BID With Meals  pregabalin,  "100 mg, Oral, TID  senna-docusate sodium, 2 tablet, Oral, BID  sertraline, 50 mg, Oral, Nightly  sodium chloride, 3 mL, Intravenous, Q12H  triamterene-hydrochlorothiazide, 1 tablet, Oral, Daily       Continuous Infusions:  Pharmacy to Dose enoxaparin (LOVENOX),   Pharmacy to dose warfarin,   sodium chloride, 75 mL/hr, Last Rate: 75 mL/hr (12/12/21 0017)       PRN Meds:  •  acetaminophen **OR** acetaminophen **OR** acetaminophen  •  albuterol sulfate HFA  •  senna-docusate sodium **AND** polyethylene glycol **AND** bisacodyl **AND** bisacodyl  •  ipratropium-albuterol  •  magnesium sulfate **OR** magnesium sulfate in D5W 1g/100mL (PREMIX) **OR** magnesium sulfate  •  melatonin  •  ondansetron **OR** ondansetron  •  oxyCODONE  •  Pharmacy to Dose enoxaparin (LOVENOX)  •  Pharmacy to dose warfarin  •  potassium chloride **OR** potassium chloride **OR** potassium chloride  •  sodium chloride  •  sodium chloride  •  temazepam     ALLERGIES:      Allergies   Allergen Reactions   • Morphine Nausea Only and Hives   • Penicillin V Potassium Rash   • Sulfa Antibiotics Rash   • Levaquin [Levofloxacin] Nausea Only and Dizziness     When taken with Coumadin   • Amoxicillin Rash       Objective    VITALS:   /66 (BP Location: Right arm)   Pulse 82   Temp 98.1 °F (36.7 °C) (Oral)   Resp 16   Ht 165.1 cm (65\")   Wt 70.4 kg (155 lb 4.8 oz)   LMP  (LMP Unknown)   SpO2 99%   BMI 25.84 kg/m²     PHYSICAL EXAM:     Physical Exam  Vitals and nursing note reviewed. Exam conducted with a chaperone present.   Constitutional:       General: She is not in acute distress.     Appearance: Normal appearance. She is obese. She is ill-appearing. She is not toxic-appearing or diaphoretic.   HENT:      Head: Normocephalic and atraumatic.      Right Ear: Tympanic membrane, ear canal and external ear normal.      Left Ear: Tympanic membrane, ear canal and external ear normal.      Nose: Nose normal. No congestion or rhinorrhea.      " Mouth/Throat:      Mouth: Mucous membranes are moist.      Pharynx: Oropharynx is clear.   Eyes:      General:         Right eye: Discharge present.         Left eye: No discharge.      Extraocular Movements: Extraocular movements intact.      Pupils: Pupils are equal, round, and reactive to light.      Comments: Improved- slight redness   Neck:      Vascular: No carotid bruit.   Cardiovascular:      Rate and Rhythm: Regular rhythm. Tachycardia present.      Pulses: Normal pulses.      Heart sounds: Normal heart sounds. No murmur heard.  No friction rub. No gallop.    Pulmonary:      Effort: Pulmonary effort is normal. No respiratory distress.      Breath sounds: No stridor. Wheezing and rhonchi present. No rales.   Chest:      Chest wall: No tenderness.   Abdominal:      General: Abdomen is flat. Bowel sounds are normal. There is no distension.      Palpations: Abdomen is soft. There is no mass.      Tenderness: There is no abdominal tenderness. There is no guarding or rebound.      Hernia: No hernia is present.   Genitourinary:     Comments: deferred  Musculoskeletal:         General: No swelling, tenderness or signs of injury. Normal range of motion.      Cervical back: Normal range of motion and neck supple. No rigidity or tenderness.   Lymphadenopathy:      Cervical: No cervical adenopathy.   Skin:     General: Skin is warm and dry.      Capillary Refill: Capillary refill takes less than 2 seconds.      Coloration: Skin is not jaundiced.      Findings: No erythema or rash.      Comments: S/p Left subclavian port removal- left chest incision   Neurological:      General: No focal deficit present.      Mental Status: She is alert and oriented to person, place, and time. Mental status is at baseline.      Cranial Nerves: No cranial nerve deficit.      Sensory: No sensory deficit.      Motor: No weakness.      Coordination: Coordination normal.      Gait: Gait normal.      Deep Tendon Reflexes: Reflexes normal.    Psychiatric:         Mood and Affect: Mood normal.         Behavior: Behavior normal.         Thought Content: Thought content normal.         Judgment: Judgment normal.         I have reexamined the patient and the results are consistent with the previously documented exam. Cindy Ball MD     RECENT LABS:    Lab Results (last 24 hours)     Procedure Component Value Units Date/Time    Magnesium [[id number]]  (Normal) Collected: 12/12/21 1048    Specimen: Blood Updated: 12/12/21 1153     Magnesium 1.6 mg/dL      Comment: Result checked       Comprehensive Metabolic Panel [[id number]]  (Abnormal) Collected: 12/12/21 1048    Specimen: Blood Updated: 12/12/21 1150     Glucose 107 mg/dL      BUN 17 mg/dL      Creatinine 0.60 mg/dL      Sodium 136 mmol/L      Potassium 4.4 mmol/L      Chloride 99 mmol/L      CO2 25.0 mmol/L      Calcium 7.2 mg/dL      Total Protein 6.1 g/dL      Albumin 2.30 g/dL      ALT (SGPT) 14 U/L      AST (SGOT) 25 U/L      Alkaline Phosphatase 189 U/L      Total Bilirubin 0.2 mg/dL      eGFR Non African Amer 100 mL/min/1.73      Globulin 3.8 gm/dL      A/G Ratio 0.6 g/dL      BUN/Creatinine Ratio 28.3     Anion Gap 12.0 mmol/L     Narrative:      GFR Normal >60  Chronic Kidney Disease <60  Kidney Failure <15      Protime-INR [[id number]]  (Abnormal) Collected: 12/12/21 1048    Specimen: Blood Updated: 12/12/21 1137     Protime 13.0 Seconds      INR 1.19    CBC & Differential [[id number]]  (Abnormal) Collected: 12/12/21 1048    Specimen: Blood Updated: 12/12/21 1135    Narrative:      The following orders were created for panel order CBC & Differential.  Procedure                               Abnormality         Status                     ---------                               -----------         ------                     CBC Auto Differential[[id number]]        Abnormal            Final result                 Please view results for these tests on the individual orders.    CBC Auto  Differential [[id number]]  (Abnormal) Collected: 12/12/21 1048    Specimen: Blood Updated: 12/12/21 1135     WBC 8.50 10*3/mm3      RBC 3.58 10*6/mm3      Hemoglobin 9.3 g/dL      Hematocrit 29.1 %      MCV 81.3 fL      MCH 26.1 pg      MCHC 32.1 g/dL      RDW 18.7 %      RDW-SD 53.4 fl      MPV 8.8 fL      Platelets 195 10*3/mm3      Neutrophil % 85.9 %      Lymphocyte % 4.4 %      Monocyte % 8.1 %      Eosinophil % 1.2 %      Basophil % 0.4 %      Neutrophils, Absolute 7.30 10*3/mm3      Lymphocytes, Absolute 0.40 10*3/mm3      Monocytes, Absolute 0.70 10*3/mm3      Eosinophils, Absolute 0.10 10*3/mm3      Basophils, Absolute 0.00 10*3/mm3      nRBC 0.0 /100 WBC     Wound Culture - Wound, Chest, Left [[id number]] Collected: 12/10/21 1148    Specimen: Wound from Chest, Left Updated: 12/12/21 1025     Wound Culture No growth at 2 days     Gram Stain Rare (1+) WBCs per low power field      No organisms seen    Blood Culture - Blood, Arm, Left [[id number]]  (Normal) Collected: 12/10/21 1414    Specimen: Blood from Arm, Left Updated: 12/11/21 1515     Blood Culture No growth at 24 hours          PENDING RESULTS:     IMAGING REVIEWED:  XR Chest 1 View    Result Date: 12/11/2021  1. Patchy bilateral airspace opacities likely representing multifocal pneumonia. No significant change from prior exam dated 12/08/2021. 2. Removal of the left-sided chest port.  Electronically Signed By-Cristian Pennington MD On:12/11/2021 5:49 PM This report was finalized on [id number] by  Cristian Pennington MD.      Assessment/Plan      ASSESSMENT:    Recurrent ovarian cancer with metastasis to brain, colon, abdominal wall and pulmonary involvement: She has had  Multiple lines of therapy in the past but most recently she is on Letrozol. Patient is currently on  Palliative XRT to the whole  brain         Chemotherapy history:    Doxorubicin Liposomal/Carboplatin: received 16 cycles: 2/15/2019 to 6/11/2020 : life time dose/progression of  disease  Topotecan weekly: received 3 cycles : 6/2020 to 12/17/2020: progression of disease  Pemetrexed: Received 3 cycles: 12/23/20 to 3/10/2021: progression of disease  Cisplatin/Gemcitabine:  Received 2 cycles: 3/12/2021 to 5/7/2021- progression of disease  Denosumab (Xgeva) given every 28 days: Received 6 cycles out of 12 cycles- 2/1/21 to 7/16/21  Letrozole (Femara): 2.5mg by mouth daily: Started 9/22/2021: active treatment     2. 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 Will continue the same. ID is following.  Patient does not want any invasive testing.  3. Coumadin toxicity: INR > 8.  Holding coumadin was given Vitamin K in ED. Monitoring INR and coumadin  Per pharmacy: INR remains subtherapeutic.  I have increased warfarin to 4 mg p.o. daily  4. Hypokalemia: replaced per primary team: on oral supplement  5. History of DVT: on anticoagulation with Coumadin- previously held due to coumadin toxicity  6. Pancytopenia: due to chemotherapy: on Procrit  7. Iron deficiency anemia: Hemoglobin 8.3, HCT 27.4, MCV 81.1, MCH 26.4  8. Hypomagnesemia: replaced per primary team  9. Right eye bacterial conjunctivitis: will order Erythromycin eye gtts.   10. Candidemia, PCR detected Candida albicans due to port: s/p port removal. Patient getting IV micafungin, IV rocephin     PLANS    1. S/P left subclavian port removal per general surgery  2. IV ABT rocephin , micafungin  3. Increase warfarin to 4 mg p.o. daily  4. Daily PT/INR  5. Blood transfusion: Administer 1 unit of PRBC's for hemoglobin <7.5  6. Monitor for bleeding  7. Continue Femara   8. Continue Erythromycin ophthalmic 0.5% gtts- give to right eye four times a day x7 days  9.  Continue supportive care  10.  Discussed with patient       Electronically signed by Cindy Ball MD, 12/12/21, 1:04 PM EST.    Electronically signed by Cindy Ball MD, 12/11/21, 12:29 PM  EST.

## 2021-12-12 NOTE — PROGRESS NOTES
"PULMONARY CRITICAL CARE Progress  NOTE      PATIENT IDENTIFICATION:  Name: Tahira Zimmerman  MRN: [id number]  :  1955     Age: 66 y.o.  Sex: female    DATE OF Note:  2021   Referring Physician: Jaky Pettit MD                  Subjective:   Feeling better, no new issue, no SOB no chest pain, no nausea or vomiting, no change in bowel habit, no dysuria,  no new  skin rash or itching.      Objective:  tMax 24 hrs: Temp (24hrs), Av.2 °F (36.8 °C), Min:97.9 °F (36.6 °C), Max:98.7 °F (37.1 °C)      Vitals Ranges:   Temp:  [97.9 °F (36.6 °C)-98.7 °F (37.1 °C)] 98.7 °F (37.1 °C)  Heart Rate:  [66-90] 90  Resp:  [16-20] 16  BP: (106-152)/(66-87) 120/67    Intake and Output Last 3 Shifts:   I/O last 3 completed shifts:  In: 1324 [P.O.:420; I.V.:804; IV Piggyback:100]  Out: -     Exam:  /67 (BP Location: Right arm, Patient Position: Sitting)   Pulse 90   Temp 98.7 °F (37.1 °C) (Oral)   Resp 16   Ht 165.1 cm (65\")   Wt 70.4 kg (155 lb 4.8 oz)   LMP  (LMP Unknown)   SpO2 96%   BMI 25.84 kg/m²     General Appearance:     HEENT:  Normocephalic, without obvious abnormality, Conjunctiva/corneas clear,.  Normal external ear canals, Nares normal, no drainage     Neck:  Supple, symmetrical, trachea midline. No JVD.  Lungs /Chest wall:   Bilateral basal rhonchi, respirations unlabored symmetrical wall movement.     Heart:  Regular rate and rhythm, systolic murmur PMI left sternal border  Abdomen: Soft, non-tender, no masses, no organomegaly.    Extremities: Trace edema no clubbing or Cyanosis        Medications:    Current Facility-Administered Medications:   •  acetaminophen (TYLENOL) tablet 650 mg, 650 mg, Oral, Q4H PRN **OR** acetaminophen (TYLENOL) 160 MG/5ML solution 650 mg, 650 mg, Oral, Q4H PRN **OR** acetaminophen (TYLENOL) suppository 650 mg, 650 mg, Rectal, Q4H PRN, Jose Urena MD  •  albuterol sulfate HFA (PROVENTIL HFA;VENTOLIN HFA;PROAIR HFA) inhaler 2 puff, 2 puff, " Inhalation, Q6H PRN, Jose Urena MD  •  sennosides-docusate (PERICOLACE) 8.6-50 MG per tablet 2 tablet, 2 tablet, Oral, BID, 2 tablet at 12/12/21 0854 **AND** polyethylene glycol (MIRALAX) packet 17 g, 17 g, Oral, Daily PRN **AND** bisacodyl (DULCOLAX) EC tablet 5 mg, 5 mg, Oral, Daily PRN **AND** bisacodyl (DULCOLAX) suppository 10 mg, 10 mg, Rectal, Daily PRN, Jose Urena MD  •  budesonide (PULMICORT) nebulizer solution 0.5 mg, 0.5 mg, Nebulization, BID - RT, Amanda Walter APRN, 0.5 mg at 12/12/21 0742  •  calcium 500 mg vitamin D 5 mcg (200 UT) per tablet 1 tablet, 1 tablet, Oral, BID, Jose Urena MD, 1 tablet at 12/12/21 0854  •  cefTRIAXone (ROCEPHIN) 1 g in sodium chloride 0.9 % 100 mL IVPB, 1 g, Intravenous, Q24H, Amanda Walter APRN, Last Rate: 200 mL/hr at 12/11/21 2237, 1 g at 12/11/21 2237  •  cetirizine (zyrTEC) tablet 10 mg, 10 mg, Oral, Nightly, Jose Urena MD, 10 mg at 12/11/21 2135  •  enoxaparin (LOVENOX) syringe 70 mg, 1 mg/kg, Subcutaneous, Q12H, Jose Urena MD, 70 mg at 12/12/21 0853  •  erythromycin (ROMYCIN) ophthalmic ointment, , Right Eye, 4x Daily, Jose Urena MD, Given at 12/12/21 1249  •  famotidine (PEPCID) tablet 40 mg, 40 mg, Oral, QA AC, Jose Urena MD, 40 mg at 12/12/21 0855  •  folic acid (FOLVITE) tablet 1,000 mcg, 1,000 mcg, Oral, Daily, Jose Urena MD, 1,000 mcg at 12/12/21 0855  •  guaiFENesin (MUCINEX) 12 hr tablet 1,200 mg, 1,200 mg, Oral, BID, Jose Urena MD, 1,200 mg at 12/12/21 0855  •  ipratropium-albuterol (DUO-NEB) nebulizer solution 3 mL, 3 mL, Nebulization, Q4H PRN, Jose Urena MD, 3 mL at 12/11/21 0436  •  ipratropium-albuterol (DUO-NEB) nebulizer solution 3 mL, 3 mL, Nebulization, 4x Daily - RT, Amanda Walter APRN, 3 mL at 12/12/21 1030  •  letrozole (FEMARA) tablet 2.5 mg, 2.5 mg, Oral, Daily, Jose Urena MD, 2.5 mg  at 12/12/21 0854  •  magnesium oxide (MAG-OX) tablet 400 mg, 400 mg, Oral, TID, Jose Urena MD, 400 mg at 12/12/21 1535  •  magnesium sulfate 4 gram infusion - Mg less than or equal to 1mg/dL, 4 g, Intravenous, PRN, Last Rate: 25 mL/hr at 12/11/21 1830, 4 g at 12/11/21 1830 **OR** magnesium sulfate 3 gram infusion (1gm x 3) - Mg 1.1 - 1.5 mg/dL, 1 g, Intravenous, PRN **OR** Magnesium Sulfate 2 gram infusion- Mg 1.6 - 1.9 mg/dL, 2 g, Intravenous, PRN, Jaky Pettit MD  •  melatonin tablet 5 mg, 5 mg, Oral, Nightly PRN, Jose Urena MD  •  micafungin sodium (MYCAMINE) 150 mg in sodium chloride 0.9 % 100 mL IVPB, 150 mg, Intravenous, Q24H, Cony Shelby MD, Last Rate: 100 mL/hr at 12/11/21 1716, 150 mg at 12/11/21 1716  •  montelukast (SINGULAIR) tablet 10 mg, 10 mg, Oral, Nightly, Jose Urena MD, 10 mg at 12/11/21 2134  •  ondansetron (ZOFRAN) tablet 4 mg, 4 mg, Oral, Q6H PRN **OR** ondansetron (ZOFRAN) injection 4 mg, 4 mg, Intravenous, Q6H PRN, Jose Urena MD, 4 mg at 12/12/21 1027  •  oxyCODONE (ROXICODONE) immediate release tablet 20 mg, 20 mg, Oral, Q4H PRN, Jose Urena MD, 20 mg at 12/10/21 1546  •  Pharmacy to Dose enoxaparin (LOVENOX), , Does not apply, Continuous PRN, Jose Urena MD  •  Pharmacy to dose warfarin, , Does not apply, Continuous PRN, Jose Urena MD  •  phosphorus (K PHOS NEUTRAL) tablet 1 tablet, 250 mg, Oral, TID, Jose Urena MD, 1 tablet at 12/12/21 1535  •  potassium chloride (K-DUR,KLOR-CON) CR tablet 20 mEq, 20 mEq, Oral, BID With Meals, Jose Urena MD, 20 mEq at 12/12/21 0855  •  potassium chloride (K-DUR,KLOR-CON) CR tablet 40 mEq, 40 mEq, Oral, PRN, 40 mEq at 12/09/21 0354 **OR** potassium chloride (KLOR-CON) packet 40 mEq, 40 mEq, Oral, PRN **OR** potassium chloride 10 mEq in 100 mL IVPB, 10 mEq, Intravenous, Q1H PRN, Jose Urena MD  •  pregabalin (LYRICA)  capsule 100 mg, 100 mg, Oral, TID, Jose Urena MD, 100 mg at 12/12/21 1535  •  sertraline (ZOLOFT) tablet 50 mg, 50 mg, Oral, Nightly, Jose Urena MD, 50 mg at 12/11/21 2134  •  sodium chloride 0.9 % flush 10 mL, 10 mL, Intravenous, PRN, Jose Urena MD  •  sodium chloride 0.9 % flush 3 mL, 3 mL, Intravenous, Q12H, Jose Urena MD, 3 mL at 12/12/21 0856  •  sodium chloride 0.9 % flush 3-10 mL, 3-10 mL, Intravenous, PRN, Jose Urena MD  •  sodium chloride 0.9 % infusion, 75 mL/hr, Intravenous, Continuous, Jose Urena MD, Last Rate: 75 mL/hr at 12/12/21 0017, 75 mL/hr at 12/12/21 0017  •  temazepam (RESTORIL) capsule 30 mg, 30 mg, Oral, Nightly PRN, Jose Urena MD  •  triamterene-hydrochlorothiazide (MAXZIDE-25) 37.5-25 MG per tablet 1 tablet, 1 tablet, Oral, Daily, Jose Urena MD, 1 tablet at 12/12/21 0855  •  warfarin (COUMADIN) tablet 4 mg, 4 mg, Oral, Once, Cindy Ball MD    Data Review:  All labs (24hrs):   Recent Results (from the past 24 hour(s))   Protime-INR    Collection Time: 12/12/21 10:48 AM    Specimen: Blood   Result Value Ref Range    Protime 13.0 (L) 19.4 - 28.5 Seconds    INR 1.19 (L) 2.00 - 3.00   Comprehensive Metabolic Panel    Collection Time: 12/12/21 10:48 AM    Specimen: Blood   Result Value Ref Range    Glucose 107 (H) 65 - 99 mg/dL    BUN 17 8 - 23 mg/dL    Creatinine 0.60 0.57 - 1.00 mg/dL    Sodium 136 136 - 145 mmol/L    Potassium 4.4 3.5 - 5.2 mmol/L    Chloride 99 98 - 107 mmol/L    CO2 25.0 22.0 - 29.0 mmol/L    Calcium 7.2 (L) 8.6 - 10.5 mg/dL    Total Protein 6.1 6.0 - 8.5 g/dL    Albumin 2.30 (L) 3.50 - 5.20 g/dL    ALT (SGPT) 14 1 - 33 U/L    AST (SGOT) 25 1 - 32 U/L    Alkaline Phosphatase 189 (H) 39 - 117 U/L    Total Bilirubin 0.2 0.0 - 1.2 mg/dL    eGFR Non African Amer 100 >60 mL/min/1.73    Globulin 3.8 gm/dL    A/G Ratio 0.6 g/dL    BUN/Creatinine Ratio 28.3 (H)  7.0 - 25.0    Anion Gap 12.0 5.0 - 15.0 mmol/L   CBC Auto Differential    Collection Time: 12/12/21 10:48 AM    Specimen: Blood   Result Value Ref Range    WBC 8.50 3.40 - 10.80 10*3/mm3    RBC 3.58 (L) 3.77 - 5.28 10*6/mm3    Hemoglobin 9.3 (L) 12.0 - 15.9 g/dL    Hematocrit 29.1 (L) 34.0 - 46.6 %    MCV 81.3 79.0 - 97.0 fL    MCH 26.1 (L) 26.6 - 33.0 pg    MCHC 32.1 31.5 - 35.7 g/dL    RDW 18.7 (H) 12.3 - 15.4 %    RDW-SD 53.4 37.0 - 54.0 fl    MPV 8.8 6.0 - 12.0 fL    Platelets 195 140 - 450 10*3/mm3    Neutrophil % 85.9 (H) 42.7 - 76.0 %    Lymphocyte % 4.4 (L) 19.6 - 45.3 %    Monocyte % 8.1 5.0 - 12.0 %    Eosinophil % 1.2 0.3 - 6.2 %    Basophil % 0.4 0.0 - 1.5 %    Neutrophils, Absolute 7.30 (H) 1.70 - 7.00 10*3/mm3    Lymphocytes, Absolute 0.40 (L) 0.70 - 3.10 10*3/mm3    Monocytes, Absolute 0.70 0.10 - 0.90 10*3/mm3    Eosinophils, Absolute 0.10 0.00 - 0.40 10*3/mm3    Basophils, Absolute 0.00 0.00 - 0.20 10*3/mm3    nRBC 0.0 0.0 - 0.2 /100 WBC   Magnesium    Collection Time: 12/12/21 10:48 AM    Specimen: Blood   Result Value Ref Range    Magnesium 1.6 1.6 - 2.4 mg/dL        Imaging:  XR Chest 1 View  Narrative: EXAMINATION: XR CHEST 1 VW-     DATE OF EXAM: 12/11/2021 5:08 PM     INDICATION: SOB; J18.9-Pneumonia, unspecified organism; R06.02-Shortness  of breath; R93.0-Abnormal findings on diagnostic imaging of skull and  head, not elsewhere classified; R79.1-Abnormal coagulation profile;  Z95.828-Presence of other vascular implants and grafts.     COMPARISON: Chest radiograph dated 12/08/2021     TECHNIQUE: Portable AP view of the chest was obtained.     FINDINGS:  The cardiomediastinal silhouette is within normal limits. Pulmonary  vascularity is unremarkable. There are patchy areas of airspace opacity  throughout both lungs likely representing multifocal pneumonia. There is  no pleural effusion or pneumothorax. There are degenerative changes of  the thoracic spine. There is no free air under the  diaphragm.     Impression: 1. Patchy bilateral airspace opacities likely representing multifocal  pneumonia. No significant change from prior exam dated 12/08/2021.  2. Removal of the left-sided chest port.     Electronically Signed By-Cristian Pennington MD On:12/11/2021 5:49 PM  This report was finalized on [id number] by  Cristian Pennington MD.       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         Total Critical care time in direct medical management (   ) minutes  Alex Buchanan MD. D, ABSM.     12/12/2021  15:59 EST

## 2021-12-13 PROBLEM — B49 FUNGEMIA: Status: ACTIVE | Noted: 2021-01-01

## 2021-12-13 NOTE — PROGRESS NOTES
Infectious Diseases Progress Note      LOS: 4 days   Patient Care Team:  Noemy Queen MD as PCP - General (Family Medicine)  Cindy Ball MD as Consulting Physician (Hematology and Oncology)    Chief Complaint: Shortness of breath    Subjective       The patient has been afebrile for the last 24 hours.  The patient is currently on 4 L oxygen via nasal cannula.  The patient denied having any new complaints today but is still feeling short of breath.  She is currently in the chair.      Review of Systems:   Review of Systems   Constitutional: Positive for fatigue.   HENT: Negative.    Eyes: Negative.    Respiratory: Positive for shortness of breath.    Cardiovascular: Negative.    Gastrointestinal: Negative.    Endocrine: Negative.    Genitourinary: Negative.    Musculoskeletal: Negative.    Skin: Negative.    Neurological: Negative.    Psychiatric/Behavioral: Negative.    All other systems reviewed and are negative.       Objective     Vital Signs  Temp:  [97.9 °F (36.6 °C)-98.8 °F (37.1 °C)] 98.8 °F (37.1 °C)  Heart Rate:  [] 87  Resp:  [16-18] 18  BP: (106-130)/(66-79) 117/76    Physical Exam:  Physical Exam  Vitals and nursing note reviewed.   Constitutional:       General: She is not in acute distress.     Appearance: Normal appearance. She is well-developed and normal weight. She is not diaphoretic.   HENT:      Head: Normocephalic and atraumatic.   Eyes:      General: No scleral icterus.     Extraocular Movements: Extraocular movements intact.      Conjunctiva/sclera: Conjunctivae normal.      Pupils: Pupils are equal, round, and reactive to light.   Cardiovascular:      Rate and Rhythm: Normal rate and regular rhythm.      Heart sounds: Normal heart sounds, S1 normal and S2 normal. No murmur heard.      Pulmonary:      Effort: Pulmonary effort is normal. No respiratory distress.      Breath sounds: No stridor. Rales present. No wheezing.   Chest:      Chest wall: No tenderness.    Abdominal:      General: Bowel sounds are normal. There is no distension.      Palpations: Abdomen is soft. There is no mass.      Tenderness: There is no abdominal tenderness. There is no guarding.   Musculoskeletal:         General: No swelling, tenderness or deformity. Normal range of motion.      Cervical back: Neck supple.   Skin:     General: Skin is warm and dry.      Coloration: Skin is not pale.      Findings: No bruising, erythema or rash.      Comments: Multiple skin nodules noticed on the head and 1 large nodule in the left anterior chest     Port has been removed   Neurological:      General: No focal deficit present.      Mental Status: She is alert and oriented to person, place, and time. Mental status is at baseline.      Cranial Nerves: No cranial nerve deficit.   Psychiatric:         Mood and Affect: Mood normal.          Results Review:    I have reviewed all clinical data, test, lab, and imaging results.     Radiology  No Radiology Exams Resulted Within Past 24 Hours    Cardiology    Laboratory    Results from last 7 days   Lab Units 12/12/21  1048 12/11/21  0552 12/10/21  0537 12/09/21  0529 12/08/21  1910   WBC 10*3/mm3 8.50 7.60 7.40 5.90 4.20   HEMOGLOBIN g/dL 9.3* 8.6* 8.3* 8.8* 9.6*   HEMATOCRIT % 29.1* 26.6* 25.6* 27.4* 29.6*   PLATELETS 10*3/mm3 195 183 203 232 222     Results from last 7 days   Lab Units 12/12/21  1048 12/11/21  0552 12/10/21  0537 12/09/21  0529 12/08/21  1910   SODIUM mmol/L 136 136 136 134* 134*   POTASSIUM mmol/L 4.4 4.4 3.9 4.1 3.3*   CHLORIDE mmol/L 99 101 101 99 97*   CO2 mmol/L 25.0 25.0 25.0 26.0 25.0   BUN mg/dL 17 21 24* 29* 31*   CREATININE mg/dL 0.60 0.71 0.73 0.95 1.01*   GLUCOSE mg/dL 107* 104* 86 120* 155*   ALBUMIN g/dL 2.30*  --  2.40*  --  2.60*   BILIRUBIN mg/dL 0.2  --  0.2  --  0.3   ALK PHOS U/L 189*  --  181*  --  209*   AST (SGOT) U/L 25  --  26  --  22   ALT (SGPT) U/L 14  --  13  --  10   CALCIUM mg/dL 7.2* 6.7* 6.7* 6.8* 7.0*                  Microbiology   Microbiology Results (last 10 days)     Procedure Component Value - Date/Time    Blood Culture - Blood, Arm, Left [[id number]]  (Normal) Collected: 12/10/21 1414    Lab Status: Preliminary result Specimen: Blood from Arm, Left Updated: 12/12/21 1515     Blood Culture No growth at 2 days    Wound Culture - Wound, Chest, Left [[id number]] Collected: 12/10/21 1148    Lab Status: Preliminary result Specimen: Wound from Chest, Left Updated: 12/12/21 1025     Wound Culture No growth at 2 days     Gram Stain Rare (1+) WBCs per low power field      No organisms seen    Blood Culture - Blood, Chest, Left [[id number]]  (Abnormal) Collected: 12/08/21 1946    Lab Status: Final result Specimen: Blood from Chest, Left Updated: 12/11/21 0648     Blood Culture Candida albicans     Comment: Infectious disease consultation is highly recommended to rule out distant foci of infection.        Isolated from Aerobic Bottle     Gram Stain Aerobic Bottle Yeast    Narrative:      Refer to previous blood culture collected on 12/8/2021 1910 for MICs    Blood Culture - Blood, Chest, Left [[id number]]  (Abnormal)  (Susceptibility) Collected: 12/08/21 1910    Lab Status: Final result Specimen: Blood from Chest, Left Updated: 12/11/21 0648     Blood Culture Candida albicans     Comment: Infectious disease consultation is highly recommended to rule out distant foci of infection.        Isolated from Aerobic and Anaerobic Bottles     Gram Stain Aerobic Bottle Yeast      Anaerobic Bottle Yeast    Susceptibility      Candida albicans     OSORIO     Fluconazole Susceptible     Micafungin Susceptible                         Respiratory Panel PCR w/COVID-19(SARS-CoV-2) JAMAL/ROLF/ABENA/PAD/COR/MAD/MICHAEL In-House, NP Swab in UTM/VTM, 3-4 HR TAT - Swab, Nasopharynx [[id number]]  (Normal) Collected: 12/08/21 1910    Lab Status: Final result Specimen: Swab from Nasopharynx Updated: 12/08/21 2029     ADENOVIRUS, PCR Not Detected     Coronavirus 229E Not  Detected     Coronavirus HKU1 Not Detected     Coronavirus NL63 Not Detected     Coronavirus OC43 Not Detected     COVID19 Not Detected     Human Metapneumovirus Not Detected     Human Rhinovirus/Enterovirus Not Detected     Influenza A PCR Not Detected     Influenza B PCR Not Detected     Parainfluenza Virus 1 Not Detected     Parainfluenza Virus 2 Not Detected     Parainfluenza Virus 3 Not Detected     Parainfluenza Virus 4 Not Detected     RSV, PCR Not Detected     Bordetella pertussis pcr Not Detected     Bordetella parapertussis PCR Not Detected     Chlamydophila pneumoniae PCR Not Detected     Mycoplasma pneumo by PCR Not Detected    Narrative:      In the setting of a positive respiratory panel with a viral infection PLUS a negative procalcitonin without other underlying concern for bacterial infection, consider observing off antibiotics or discontinuation of antibiotics and continue supportive care. If the respiratory panel is positive for atypical bacterial infection (Bordetella pertussis, Chlamydophila pneumoniae, or Mycoplasma pneumoniae), consider antibiotic de-escalation to target atypical bacterial infection.    Blood Culture ID, PCR - Blood, Chest, Left [[id number]]  (Abnormal) Collected: 12/08/21 1910    Lab Status: Final result Specimen: Blood from Chest, Left Updated: 12/09/21 1422     BCID, PCR Candida albicans. Identification by BCID2 PCR     BOTTLE TYPE Aerobic Bottle    Narrative:      Infectious disease consultation is highly recommended to rule out distant foci of infection.          Medication Review:       Schedule Meds  budesonide, 0.5 mg, Nebulization, BID - RT  calcium 500 mg vitamin D 5 mcg (200 UT), 1 tablet, Oral, BID  cefTRIAXone, 1 g, Intravenous, Q24H  cetirizine, 10 mg, Oral, Nightly  enoxaparin, 1 mg/kg, Subcutaneous, Q12H  erythromycin, , Right Eye, 4x Daily  famotidine, 40 mg, Oral, QAM AC  folic acid, 1,000 mcg, Oral, Daily  guaiFENesin, 1,200 mg, Oral,  BID  ipratropium-albuterol, 3 mL, Nebulization, 4x Daily - RT  letrozole, 2.5 mg, Oral, Daily  magnesium oxide, 400 mg, Oral, TID  micafungin (MYCAMINE) IV, 150 mg, Intravenous, Q24H  montelukast, 10 mg, Oral, Nightly  phosphorus, 250 mg, Oral, TID  potassium chloride, 20 mEq, Oral, BID With Meals  pregabalin, 100 mg, Oral, TID  senna-docusate sodium, 2 tablet, Oral, BID  sertraline, 50 mg, Oral, Nightly  sodium chloride, 3 mL, Intravenous, Q12H  triamterene-hydrochlorothiazide, 1 tablet, Oral, Daily        Infusion Meds  Pharmacy to Dose enoxaparin (LOVENOX),   Pharmacy to dose warfarin,   sodium chloride, 75 mL/hr, Last Rate: 75 mL/hr (12/12/21 0017)        PRN Meds  •  acetaminophen **OR** acetaminophen **OR** acetaminophen  •  albuterol sulfate HFA  •  senna-docusate sodium **AND** polyethylene glycol **AND** bisacodyl **AND** bisacodyl  •  ipratropium-albuterol  •  magnesium sulfate **OR** magnesium sulfate in D5W 1g/100mL (PREMIX) **OR** magnesium sulfate  •  melatonin  •  ondansetron **OR** ondansetron  •  oxyCODONE  •  Pharmacy to Dose enoxaparin (LOVENOX)  •  Pharmacy to dose warfarin  •  potassium chloride **OR** potassium chloride **OR** potassium chloride  •  sodium chloride  •  sodium chloride  •  temazepam        Assessment/Plan       Antimicrobial Therapy   1.  Micafungin        2.  Rocephin        3.        4.        5.            Assessment     Candidemia with Candida albicans.  Most likely secondary to port infection  -Port was removed on 12/10/2021-tip culture is pending     Dyspnea and hypoxia.  Chest x-ray showed interstitial lung infiltrates.  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  Patient also have multiple skin nodules concerning for metastasis.  Patient was on oral chemotherapy prior to admission     The patient is s/p port placement in the past     Plan     Continue IV micafungin 150 mg daily patient will need 2 weeks of treatment.  May be able to switch to p.o. Diflucan 100 mg daily at okay to discharge from Infectious Disease standpoint  Continue IV Rocephin for now  Continue supportive care  A.m. labs  Long-term prognosis is poor secondary to advanced and metastatic ovarian cancer       Millicent Fowler, IRENA  12/12/21  20:01 EST    Note is dictated utilizing voice recognition software/Dragon

## 2021-12-13 NOTE — PROGRESS NOTES
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  Non-smoker  Hypertension  Hyperlipidemia  Pneumonia  History of DVT  Pernicious anemia  Osteoarthritis of cervical spine  Malignancy pain  Anxiety  History of lung nodule  Osteoporosis  Cytokine release syndrome, grade 2  Diverticulosis  Colon polyp  Clotting disorder status post chemo  Spinal stenosis    Bronchoscopy 6/15/2021  -mucopurulent secretions were suctioned therapeutically  -Diffuse endobronchial nodular densities  Suspicious for metastatic malignancy versus infectious etiology  -Positive for Candida, otherwise BAL negative    Echo 6/14/2021  -EF 56%    Respiratory panel negative  Blood cultures negative thus far    Reports increased shortness of breath with exertion compared to how she is at home      Plan    Continue to titrate oxygen- Currently on 5 L nasal cannula  ABX-Rocephin 1 g for 6 doses started 12/9/2021 for pneumonia  Mucinex  Singulair  Inhaled corticosteroids  Bronchodilators  Electrolyte/Glycemic control  DVT/GI Prophylaxis-Pepcid and Lovenox.  Coumadin 4 mg starting today.     CT 12/9/2021         LOS: 5 days     Subjective         Objective     Vital signs for last 24 hours:  Vitals:    12/13/21 0809 12/13/21 0811 12/13/21 0813 12/13/21 1202   BP:    117/74   BP Location:    Right arm   Patient Position:    Lying   Pulse: 85 86 86 96   Resp: 16 16 16 16   Temp:    97.8 °F (36.6 °C)   TempSrc:    Oral   SpO2: 99% 99% 99% 96%   Weight:       Height:           Intake/Output last 3 shifts:  I/O last 3 completed shifts:  In: 924 [P.O.:120; I.V.:804]  Out: -   Intake/Output this shift:  No  intake/output data recorded.      Radiology  Imaging Results (Last 24 Hours)     ** No results found for the last 24 hours. **          Labs:  Results from last 7 days   Lab Units 12/13/21 0446   WBC 10*3/mm3 8.80   HEMOGLOBIN g/dL 8.3*   HEMATOCRIT % 26.0*   PLATELETS 10*3/mm3 161     Results from last 7 days   Lab Units 12/13/21  0446 12/12/21  1048 12/12/21  1048   SODIUM mmol/L 136   < > 136   POTASSIUM mmol/L 4.3   < > 4.4   CHLORIDE mmol/L 101   < > 99   CO2 mmol/L 25.0   < > 25.0   BUN mg/dL 16   < > 17   CREATININE mg/dL 0.54*   < > 0.60   CALCIUM mg/dL 6.7*   < > 7.2*   BILIRUBIN mg/dL  --   --  0.2   ALK PHOS U/L  --   --  189*   ALT (SGPT) U/L  --   --  14   AST (SGOT) U/L  --   --  25   GLUCOSE mg/dL 103*   < > 107*    < > = values in this interval not displayed.         Results from last 7 days   Lab Units 12/12/21  1048 12/10/21  0537 12/08/21  1910   ALBUMIN g/dL 2.30* 2.40* 2.60*     Results from last 7 days   Lab Units 12/08/21  1910   TROPONIN T ng/mL <0.010         Results from last 7 days   Lab Units 12/13/21  0446   MAGNESIUM mg/dL 1.2*     Results from last 7 days   Lab Units 12/13/21  0446 12/12/21  1048 12/11/21  0552   INR  1.15* 1.19* 1.24*               Meds:   SCHEDULE  budesonide, 0.5 mg, Nebulization, BID - RT  calcium 500 mg vitamin D 5 mcg (200 UT), 1 tablet, Oral, BID  cefTRIAXone, 1 g, Intravenous, Q24H  cetirizine, 10 mg, Oral, Nightly  enoxaparin, 1 mg/kg, Subcutaneous, Q12H  erythromycin, , Right Eye, 4x Daily  famotidine, 40 mg, Oral, QAM AC  folic acid, 1,000 mcg, Oral, Daily  guaiFENesin, 1,200 mg, Oral, BID  ipratropium-albuterol, 3 mL, Nebulization, 4x Daily - RT  letrozole, 2.5 mg, Oral, Daily  magnesium oxide, 400 mg, Oral, TID  micafungin (MYCAMINE) IV, 150 mg, Intravenous, Q24H  montelukast, 10 mg, Oral, Nightly  phosphorus, 250 mg, Oral, TID  potassium chloride, 20 mEq, Oral, BID With Meals  pregabalin, 100 mg, Oral, TID  senna-docusate sodium, 2 tablet, Oral,  BID  sertraline, 50 mg, Oral, Nightly  sodium chloride, 3 mL, Intravenous, Q12H  triamterene-hydrochlorothiazide, 1 tablet, Oral, Daily  warfarin, 4 mg, Oral, Daily      Infusions  Pharmacy to Dose enoxaparin (LOVENOX),   Pharmacy to dose warfarin,   sodium chloride, 75 mL/hr, Last Rate: 75 mL/hr (12/12/21 0017)      PRNs  •  acetaminophen **OR** acetaminophen **OR** acetaminophen  •  albuterol sulfate HFA  •  senna-docusate sodium **AND** polyethylene glycol **AND** bisacodyl **AND** bisacodyl  •  ipratropium-albuterol  •  magnesium sulfate **OR** magnesium sulfate in D5W 1g/100mL (PREMIX) **OR** magnesium sulfate  •  melatonin  •  ondansetron **OR** ondansetron  •  oxyCODONE  •  Pharmacy to Dose enoxaparin (LOVENOX)  •  Pharmacy to dose warfarin  •  potassium chloride **OR** potassium chloride **OR** potassium chloride  •  sodium chloride  •  sodium chloride  •  temazepam    Physical Exam:  Physical Exam  Vitals reviewed.   Constitutional:       Appearance: She is ill-appearing.   Cardiovascular:      Heart sounds: Murmur heard.       Pulmonary:      Effort: Pulmonary effort is normal.      Breath sounds: Examination of the right-lower field reveals decreased breath sounds. Examination of the left-lower field reveals decreased breath sounds. Decreased breath sounds and rhonchi present.   Skin:     General: Skin is warm and dry.   Neurological:      Mental Status: She is alert and oriented to person, place, and time.         ROS  Review of Systems   Respiratory: Positive for shortness of breath.    All other systems reviewed and are negative.          I have reviewed current clinicals.     Electronically signed by IRENA Ingram, 12/13/21, 12:23 PM EST.

## 2021-12-13 NOTE — DISCHARGE INSTR - ACTIVITY
Check INR at least twice a week.    Patient may need hospice referral if patient continues to decline.

## 2021-12-13 NOTE — PLAN OF CARE
Goal Outcome Evaluation:      Objective:     Bed mobility - Modified-Independent  Transfers - SBA  Ambulation - 20 feet CGA and FWW      Assessment: Tahira Zimmerman presents with functional mobility impairments which indicate the need for skilled intervention. Pt completed all bed mobility tasks with Mod I and amb 1x20ft with FWW and CGA. Pt having difficulty advancing LLE while amb and states that it feel very weak. Pt would benefit from inpatient rehab placement to further increase mm strength, endurance, and overall mobility at this time. Tolerating session today without incident. Will continue to follow and progress as tolerated.

## 2021-12-13 NOTE — CASE MANAGEMENT/SOCIAL WORK
Continued Stay Note  LYNNETTE Rees     Patient Name: Tahira Zimmerman  MRN: [id number]  Today's Date: 12/13/2021    Admit Date: 12/8/2021     Discharge Plan     Row Name 12/13/21 1602       Plan    Plan Comments Discharge orders noted. Informed [facility] liaison of d/c summary instructions.    Final Discharge Disposition Code 03 - skilled nursing facility (SNF)    Final Note [facility]              Phone communication or documentation only - no physical contact with patient or family.      Kayy Villanueva RN

## 2021-12-13 NOTE — DISCHARGE SUMMARY
[facility] Medicine Services  DISCHARGE SUMMARY    Patient Name: Tahira Zimmerman  : 1955  MRN: [id number]    Date of Admission: 2021  Date of Discharge:  2021  Primary Care Physician: Noemy Queen MD      Presenting Problem:   Shortness of breath [R06.02]  Elevated INR [R79.1]  Abnormal head CT [R93.0]  Pneumonia due to infectious organism, unspecified laterality, unspecified part of lung [J18.9]    Active and Resolved Hospital Problems:  Active Hospital Problems    Diagnosis POA   • **Port-A-Cath in place [Z95.828] Not Applicable   • Fungemia [B49] Yes   • Pneumonia due to infectious organism [J18.9] Yes   • Coumadin toxicity [T45.511A] Yes   • Hypokalemia [E87.6] Yes   • AMS (altered mental status) [R41.82] Yes   • Bone metastases (HCC) [C79.51] Yes   • Depression [F32.A] Yes   • Brain metastases (HCC) [C79.31] Yes   • Left upper extremity deep vein thrombosis (HCC) [I82.622] Yes   • Malignant neoplasm of right ovary (HCC) [C56.1] Yes   • Essential hypertension [I10] Yes      Resolved Hospital Problems   No resolved problems to display.         Hospital Course     Brief HPI/Hospital Course:  Tahira Zimmerman is a 66 y.o. female with past medical history of metastatic ovarian CA, DVT, Coumadin toxicity, GERD, HLD, anemia, who presents to to the emergency with increasing shortness of breath and cough.  Imaging concerning for possible pneumonia and she was admitted and started on IV antibiotics.  She was also found to have Coumadin toxicity with INR greater than 8.  She was also found to have positive blood cultures with Candida.      Hospital Course by Problem:    1.  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  Aleksandr with oncology and they suspect  majority of CT findings and symptoms from carcinomatosis rather than acute infection.  Patient reports she is not interested in aggressive diagnostic studies at this time.  No plans for further bronchoscopy.     2.  Candidemia with Candida albicans  -Port removed 12/10, culture from port tip pending  -Continue antifungal per infectious disease.    She was initially on IV micafungin and will be discharged on 11 more days of oral Diflucan.     3.  Metastatic ovarian cancer  -Appreciate oncology assistance.  Prior to admission patient has been receiving palliative XRT to whole brain.  With concerns for lymphangitic carcinomatosis spread to lungs with hypoxia and shortness of breath patient may would then affect from palliative care consult.  This can be done as an outpatient.  I discussed palliative care and hospice with patient day of discharge  She does want to finish her antifungal treatment for her candidemia and attempt some therapy recovery after hospitalization but does not want any further aggressive work-up or treatment for her metastatic cancer and wishes to remain DO NOT RESUSCITATE and interested in hospice should her symptoms worsen.     4.    History of DVT with Coumadin toxicity  -Resolved, most recent INR 1.2, remains on treatment dose Lovenox for bridge with Coumadin.  She will need INR checked twice weekly while on Diflucan.  Goal INR 2-3.  May stop Lovenox after INR reached goal.     5.  Hypokalemia  -replaced per protocol     6.  Essential hypertension  -Blood pressure currently stable.  Continue home antihypertensives.     7.  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  Recommend palliative care/hospice if respiratory status worsens.  Patient is not interested in bronchoscopy or further aggressive diagnostic studies or treatment.     8.  Anemia of chronic disease  -May and stable while in the hospital    DISCHARGE Follow Up  Recommendations for labs and diagnostics:     Twice weekly INR checks while on Diflucan, can change to once weekly once off Diflucan, goal INR 2-3    May DC Lovenox once INR at goal    Diflucan for 11 more days    Consider palliative care evaluation, possible hospice if further decline      Day of Discharge     Vital Signs:  Temp:  [97.8 °F (36.6 °C)-98.8 °F (37.1 °C)] 97.8 °F (36.6 °C)  Heart Rate:  [] 93  Resp:  [16-18] 18  BP: (101-127)/(64-76) 117/74  Flow (L/min):  [5] 5    Physical Exam:  Physical Exam     Constitutional:      Awake, alert, no acute distress  HENT:      Normocephalic and atraumatic. Nose normal. Mucous membranes are moist. Oropharynx is clear.   Eyes:      No icterus, EOM intact. Pupils are equal, round, and reactive to light.   Neck:     No LAD.  No JVD.  No thyromegaly  Cardiovascular:      Regular rate and regular rhythm.  No murmur.  No edema  Pulmonary:      Clear to auscultation bilaterally.  Normal effort  Abdominal:      Soft, NTND.  BS + all 4 quadrants  Musculoskeletal:         No joint effusions.  No atrophy   Skin:     Warm, dry, no rashes.  No lesions.  Neurological:      Alert and oriented x3, no focal neurologic deficits could be appreciated  Psychiatric:         Normal mood and affect, normal thought process and judgment        Pertinent  and/or Most Recent Results     LAB RESULTS:      Lab 12/13/21  0446 12/12/21  1048 12/11/21  0552 12/10/21  0537 12/09/21  1456 12/09/21  0529 12/09/21  0529 12/08/21  1915 12/08/21  1910   WBC 8.80 8.50 7.60 7.40  --   --  5.90  --    < >   HEMOGLOBIN 8.3* 9.3* 8.6* 8.3*  --   --  8.8*  --    < >   HEMATOCRIT 26.0* 29.1* 26.6* 25.6*  --   --  27.4*  --    < >   PLATELETS 161 195 183 203  --   --  232  --    < >   NEUTROS ABS 7.60* 7.30* 6.20 6.40  --   --  5.00  --    < >   LYMPHS ABS 0.50* 0.40* 0.40* 0.20*  --   --  0.20*  --    < >   MONOS ABS 0.70 0.70 0.80 0.70  --   --  0.60  --    < >   EOS ABS 0.10 0.10 0.10 0.10  --   --  0.10   --    < >   MCV 80.9 81.3 81.3 81.1  --   --  80.7  --    < >   LACTATE  --   --   --   --   --   --   --  1.7  --    PROTIME 12.6* 13.0* 13.5* 13.9* 15.4*   < > 23.3  --    < >    < > = values in this interval not displayed.         Lab 12/13/21  0446 12/12/21  1048 12/11/21  0552 12/10/21  0537 12/09/21  0529   SODIUM 136 136 136 136 134*   POTASSIUM 4.3 4.4 4.4 3.9 4.1   CHLORIDE 101 99 101 101 99   CO2 25.0 25.0 25.0 25.0 26.0   ANION GAP 10.0 12.0 10.0 10.0 9.0   BUN 16 17 21 24* 29*   CREATININE 0.54* 0.60 0.71 0.73 0.95   GLUCOSE 103* 107* 104* 86 120*   CALCIUM 6.7* 7.2* 6.7* 6.7* 6.8*   MAGNESIUM 1.2* 1.6 1.0*  --  1.2*         Lab 12/12/21  1048 12/10/21  0537 12/08/21 1910   TOTAL PROTEIN 6.1 5.6* 6.7   ALBUMIN 2.30* 2.40* 2.60*   GLOBULIN 3.8 3.2 4.1   ALT (SGPT) 14 13 10   AST (SGOT) 25 26 22   BILIRUBIN 0.2 0.2 0.3   ALK PHOS 189* 181* 209*         Lab 12/13/21  0446 12/12/21  1048 12/11/21  0552 12/10/21  0537 12/09/21  1456 12/09/21  0529 12/08/21 1910   PROBNP  --   --   --   --   --   --  693.0   TROPONIN T  --   --   --   --   --   --  <0.010   PROTIME 12.6* 13.0* 13.5* 13.9* 15.4*   < > 78.6*   INR 1.15* 1.19* 1.24* 1.27* 1.42*   < > >=8.00*    < > = values in this interval not displayed.                 Brief Urine Lab Results  (Last result in the past 365 days)      Color   Clarity   Blood   Leuk Est   Nitrite   Protein   CREAT   Urine HCG        12/08/21 1948 Yellow   Cloudy  Comment:  Result checked    Small (1+)   Negative   Negative   30 mg/dL (1+)               Microbiology Results (last 10 days)     Procedure Component Value - Date/Time    Blood Culture - Blood, Arm, Left [[id number]]  (Normal) Collected: 12/10/21 1414    Lab Status: Preliminary result Specimen: Blood from Arm, Left Updated: 12/12/21 1515     Blood Culture No growth at 2 days    Anaerobic Culture - Wound, Chest, Left [[id number]]  (Normal) Collected: 12/10/21 1148    Lab Status: Preliminary result Specimen: Wound from  Chest, Left Updated: 12/13/21 0827     Anaerobic Culture No anaerobes isolated at 3 days    Wound Culture - Wound, Chest, Left [[id number]] Collected: 12/10/21 1148    Lab Status: Final result Specimen: Wound from Chest, Left Updated: 12/13/21 0631     Wound Culture No growth at 3 days     Gram Stain Rare (1+) WBCs per low power field      No organisms seen    Blood Culture - Blood, Chest, Left [[id number]]  (Abnormal) Collected: 12/08/21 1946    Lab Status: Final result Specimen: Blood from Chest, Left Updated: 12/11/21 0648     Blood Culture Candida albicans     Comment: Infectious disease consultation is highly recommended to rule out distant foci of infection.        Isolated from Aerobic Bottle     Gram Stain Aerobic Bottle Yeast    Narrative:      Refer to previous blood culture collected on 12/8/2021 1910 for MICs    Blood Culture - Blood, Chest, Left [[id number]]  (Abnormal)  (Susceptibility) Collected: 12/08/21 1910    Lab Status: Final result Specimen: Blood from Chest, Left Updated: 12/11/21 0648     Blood Culture Candida albicans     Comment: Infectious disease consultation is highly recommended to rule out distant foci of infection.        Isolated from Aerobic and Anaerobic Bottles     Gram Stain Aerobic Bottle Yeast      Anaerobic Bottle Yeast    Susceptibility      Candida albicans     OSORIO     Fluconazole Susceptible     Micafungin Susceptible                         Respiratory Panel PCR w/COVID-19(SARS-CoV-2) JAMAL/ROFL/ABENA/PAD/COR/MAD/MICHAEL In-House, NP Swab in UTM/VTM, 3-4 HR TAT - Swab, Nasopharynx [[id number]]  (Normal) Collected: 12/08/21 1910    Lab Status: Final result Specimen: Swab from Nasopharynx Updated: 12/08/21 2029     ADENOVIRUS, PCR Not Detected     Coronavirus 229E Not Detected     Coronavirus HKU1 Not Detected     Coronavirus NL63 Not Detected     Coronavirus OC43 Not Detected     COVID19 Not Detected     Human Metapneumovirus Not Detected     Human Rhinovirus/Enterovirus Not Detected      Influenza A PCR Not Detected     Influenza B PCR Not Detected     Parainfluenza Virus 1 Not Detected     Parainfluenza Virus 2 Not Detected     Parainfluenza Virus 3 Not Detected     Parainfluenza Virus 4 Not Detected     RSV, PCR Not Detected     Bordetella pertussis pcr Not Detected     Bordetella parapertussis PCR Not Detected     Chlamydophila pneumoniae PCR Not Detected     Mycoplasma pneumo by PCR Not Detected    Narrative:      In the setting of a positive respiratory panel with a viral infection PLUS a negative procalcitonin without other underlying concern for bacterial infection, consider observing off antibiotics or discontinuation of antibiotics and continue supportive care. If the respiratory panel is positive for atypical bacterial infection (Bordetella pertussis, Chlamydophila pneumoniae, or Mycoplasma pneumoniae), consider antibiotic de-escalation to target atypical bacterial infection.    Blood Culture ID, PCR - Blood, Chest, Left [[id number]]  (Abnormal) Collected: 12/08/21 1910    Lab Status: Final result Specimen: Blood from Chest, Left Updated: 12/09/21 1422     BCID, PCR Candida albicans. Identification by BCID2 PCR     BOTTLE TYPE Aerobic Bottle    Narrative:      Infectious disease consultation is highly recommended to rule out distant foci of infection.          CT Head Without Contrast    Result Date: 12/8/2021  Impression: 1.Intracranial calcifications which have been suggested and could reflect sequela to treated brain metastases. 2.Hyperostosis frontalis 3.Sclerotic area involving the clivus which could relate to known metastatic disease. There also are some sclerotic areas involving the calvarium near the convexity of the head.  Electronically Signed By-Last Urrutia MD On:12/8/2021 9:00 PM This report was finalized on [id number] by  Last Urrutia MD.    CT Chest Without Contrast Diagnostic    Result Date: 12/9/2021  Impression: 1.Redemonstration of interstitial opacities in both  lungs, again 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 2.Mucous plugging in the lower lobes. 3.Similar findings of metastatic disease in the chest, including partially calcified mediastinal lymphadenopathy, subcutaneous nodules, and extensive osseous metastatic disease.    Electronically Signed By-Dary Thapa MD On:12/9/2021 4:57 PM This report was finalized on [id number] by  Dary Thapa MD.    XR Chest 1 View    Result Date: 12/11/2021  Impression: 1. Patchy bilateral airspace opacities likely representing multifocal pneumonia. No significant change from prior exam dated 12/08/2021. 2. Removal of the left-sided chest port.  Electronically Signed By-Cristian Pennington MD On:12/11/2021 5:49 PM This report was finalized on [id number] by  Cristian Pennington MD.    XR Chest 1 View    Result Date: 12/8/2021  Impression: 1.Bilateral pulmonary infiltrates. Given history of malignancy this might relate to lymphangitic spread of tumor. An underlying inflammatory infectious process would be in the differential. Correlation with clinical findings suggested.  Electronically Signed By-Last Urrutia MD On:12/8/2021 7:02 PM This report was finalized on [id number] by  Last Urrutia MD.              Results for orders placed during the hospital encounter of 06/13/21    Adult Transthoracic Echo Complete W/ Cont if Necessary Per Protocol    Interpretation Summary  · Left ventricular ejection fraction appears to be 56 - 60%.  · No pericardial effusion noted      Labs Pending at Discharge:  Pending Labs     Order Current Status    Fungus Culture - Hardware / Foreign Body, Chest, Left In process    Anaerobic Culture - Wound, Chest, Left Preliminary result    Blood Culture - Blood, Arm, Left Preliminary result          Procedures Performed    Procedure(s):  REMOVAL VENOUS ACCESS DEVICE         Consults:    Consults     Date and Time Order Name Status Description    12/11/2021  4:38 PM Inpatient Infectious Diseases Consult      12/10/2021  4:03 PM Inpatient Pulmonology Consult Completed     12/9/2021  3:31 PM Inpatient General Surgery Consult Completed     12/9/2021  2:36 PM Inpatient Infectious Diseases Consult Completed     12/8/2021 10:10 PM Inpatient Hematology & Oncology Consult Completed     12/8/2021  9:11 PM Hospitalist (on-call MD unless specified)              Discharge Details        Discharge Medications      New Medications      Instructions Start Date   enoxaparin 80 MG/0.8ML solution syringe  Commonly known as: LOVENOX   1 mg/kg (70 mg), Subcutaneous, Every 12 Hours      erythromycin 5 MG/GM ophthalmic ointment  Commonly known as: ROMYCIN   Right Eye, 4 Times Daily      fluconazole 200 MG tablet  Commonly known as: DIFLUCAN   400 mg, Oral, Every 24 Hours   Start Date: December 14, 2021        Changes to Medications      Instructions Start Date   oxyCODONE 20 MG tablet  Commonly known as: ROXICODONE  What changed: Another medication with the same name was removed. Continue taking this medication, and follow the directions you see here.   20 mg, Oral, Every 4 Hours PRN         Continue These Medications      Instructions Start Date   acetaminophen 500 MG tablet  Commonly known as: TYLENOL   1,000 mg, Oral, Every 6 Hours PRN      Calcium Carbonate 1500 (600 Ca) MG tablet   600 mg, Oral, 2 Times Daily      calcium carbonate-vitamin d 600-400 MG-UNIT per tablet   1 tablet, Oral, 2 Times Daily      cetirizine 10 MG tablet  Commonly known as: zyrTEC   10 mg, Oral, Nightly      Combivent Respimat  MCG/ACT inhaler  Generic drug: ipratropium-albuterol   INHALE 1 PUFF FOUR TIMES DAILY AS NEEDED FOR SHORTNESS OF BREATH      cyanocobalamin 1000 MCG/ML injection   1,000 mcg, Intramuscular, Every 28 Days      famotidine 40 MG tablet  Commonly known as: PEPCID   40 mg, Oral, Every Morning Before Breakfast       folic acid 1 MG tablet  Commonly known as: FOLVITE   1,000 mcg, Oral, Daily      letrozole 2.5 MG tablet  Commonly known as: FEMARA   2.5 mg, Oral, Daily      MAGnesium-Oxide 400 (241.3 Mg) MG tablet tablet  Generic drug: magnesium oxide   400 mg, Oral, 3 Times Daily      montelukast 10 MG tablet  Commonly known as: SINGULAIR   10 mg, Oral, Nightly      Mucus Relief  MG 12 hr tablet  Generic drug: guaiFENesin   TAKE TWO TABLETS BY MOUTH TWICE A DAY      ondansetron 8 MG tablet  Commonly known as: ZOFRAN   No dose, route, or frequency recorded.      phosphorus 155-852-130 MG tablet  Commonly known as: K PHOS NEUTRAL   1 tablet, Oral, 3 Times Daily      potassium chloride 20 MEQ CR tablet  Commonly known as: K-DUR,KLOR-CON   60 mEq, Oral, Daily      potassium chloride 20 MEQ CR tablet  Commonly known as: K-DUR,KLOR-CON   40 mEq, Oral, Nightly      pregabalin 100 MG capsule  Commonly known as: LYRICA   100 mg, Oral, 3 Times Daily      Roller Walker misc   1 Device, Does not apply, Daily      Commode Bedside misc   1 Device, Does not apply, Daily      sertraline 50 MG tablet  Commonly known as: ZOLOFT   50 mg, Oral, Nightly      temazepam 30 MG capsule  Commonly known as: RESTORIL   30 mg, Oral, Nightly PRN      triamterene-hydrochlorothiazide 37.5-25 MG per capsule  Commonly known as: DYAZIDE   1 capsule, Oral, Daily      warfarin 4 MG tablet  Commonly known as: COUMADIN   Take as directed         Stop These Medications    HYDROcodone-homatropine 5-1.5 MG/5ML syrup  Commonly known as: HYCODAN          Pulmicort BID  Warfarin clarification-4mg daily, goal INR 2-3    Allergies   Allergen Reactions   • Morphine Nausea Only and Hives   • Penicillin V Potassium Rash   • Sulfa Antibiotics Rash   • Levaquin [Levofloxacin] Nausea Only and Dizziness     When taken with Coumadin   • Amoxicillin Rash         Discharge Condition: Stable    Discharge Disposition:   Skilled Nursing Facility ([place] -  External)    Diet:  Hospital:  Diet Order   Procedures   • Diet Regular, Texture; Mechanical Ground         Discharge Activity:   As tolerated      CODE STATUS:  Code Status and Medical Interventions:   Ordered at: 12/08/21 2210     Level Of Support Discussed With:    Patient     Code Status (Patient has no pulse and is not breathing):    No CPR (Do Not Attempt to Resuscitate)     Medical Interventions (Patient has pulse or is breathing):    Full Support         Future Appointments   Date Time Provider Department Center   12/21/2021 11:00 AM Partha Delong MD [facility] None   1/4/2022  1:20 PM Fito Ram MD [facility]       Additional Instructions for the Follow-ups that You Need to Schedule     Ambulatory Referral to Home Health   As directed      Face to Face Visit Date: 12/9/2021    Follow-up provider for Plan of Care?: I treated the patient in an acute care facility and will not continue treatment after discharge.    Follow-up provider: NOEMY QUEEN [[id number]]    Reason/Clinical Findings: post hospital evaluation    Describe mobility limitations that make leaving home difficult: weakness    Nursing/Therapeutic Services Requested: Other (TAISHA order)    Frequency: 1 Week 1         Discharge Follow-up with PCP   As directed       Currently Documented PCP:    Noemy Queen MD    PCP Phone Number:    [phone]     Follow Up Details: 1 week, hospital follow-up               Time spent on Discharge including face to face service:  40 minutes.  Discussed discharge plan with the patient.  Answered all questions.    This patient has been examined wearing appropriate Personal Protective Equipment  12/13/21      Signature:   Electronically Signed By:  Carlee Carlson DO  12/13/21  14:28 EST

## 2021-12-13 NOTE — PLAN OF CARE
Goal Outcome Evaluation:  Plan of Care Reviewed With: patient        Progress: no change  Outcome Summary: Awaiting placement.  Precert initiated to Donovan.

## 2021-12-13 NOTE — PLAN OF CARE
----- Message from Bhargavi Ashton sent at 2021  3:44 PM EDT -----  Regarding: Dr. Berta Larkin: [phone]  Appointment not available    Caller's first and last name and relationship to patient (if not the patient): Juan Carlos Tanner      Best contact number:[phone]      Preferred date and time: First available, anytime      Scheduled appointment date and time: N/A      Reason for appointment: Vaccines for 7th grade. Details to clarify the request: Patient starts 7th grade the day after Labor Day.         Bhargavi Ashton Goal Outcome Evaluation:              Outcome Summary: patient possible discharge today.

## 2021-12-13 NOTE — PROGRESS NOTES
Hematology/Oncology Inpatient Progress Note    PATIENT NAME: Tahira Zimmerman  : 1955  MRN: [id number]    CHIEF COMPLAINT: Shortness of breath    HISTORY OF PRESENT ILLNESS: Tahira Zimmerman is a 66 y.o. female who presented to [facility] on 2021 with complaints of shortness of breath for the past several days.  Shortness of breath is accompanied with productive cough with yellowish-white sputurm.  Denies fever, chills, diaphoresis, lightheadedness, altered mental status or confusion.  Patient reports being on oxygen at home at 6 to 8 LPM per nasal cannula.  She is currently receiving oral chemotherapy for Metastatic ovarian cancer with mets to brain, bone and various abdominal organs.       21  Hematology/Oncology was consulted for Ovarian cancer with mets to brain, bone and abdomen.  Patient is well known to our clinic and follows care with Dr.Rosaline Ball.  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  She was treated with surgery and then postoperatively with adjuvant chemotherapy consisting of Taxol and carboplatin.  Six months later, she had recurrence of disease intra-abdominally between the rectum and vagina.  She was treated with intraabdominal peritoneal chemotherapy and has been free of disease since that time until 2013 when she had recurrence.  She now has recurrent ovarian cancer with metastasis to brain, colon, abdominal wall and pulmonary involvement.  She was on Carboplatin and Doxil, Taxol, Carbo, Taxotere, single agent Gemzar, norpramin and then was on Doxil and Carboplatin.  Doxil was discontinued due to approaching lifetime dosing.  Patient had progression of disease on single agent topotecan, Alimta and combination chemotherapy with Gemzar plus cisplatin.  CT abdomen and pelvis from 10/20/21 showed progression of metastatic disease within the chest, abdomen and pelvis.   CT head without contrast done 12/8/21 revealed intracranial calcifications which could reflect sequela to treated brain mets.  There is sclerotic area involving the clivus which could relate to known metastatic disease.  There are some sclerotic areas involving the calvarium near the convexity of the head.  Patient is currently on oral Letrozole with her course of brain radiation to the whole brain.       Chemotherapy history:    Doxorubicin Liposomal/Carboplatin: received 16 cycles: 2/15/2019 to 6/11/2020 : life time dose/progression of disease  Topotecan weekly: received 3 cycles : 6/2020 to 12/17/2020: progression of disease  Pemetrexed: Received 3 cycles: 12/23/20 to 3/10/2021: progression of disease  Cisplatin/Gemcitabine:  Received 2 cycles: 3/12/2021 to 5/7/2021- progression of disease  Denosumab (Xgeva) given every 28 days: Received 6 cycles out of 12 cycles- 2/1/21 to 7/16/21  Letrozole (Femara): 2.5mg by mouth daily: Started 9/22/2021: active treatment     She  has a past medical history of Anemia (2013), Cervical disc disorder (2013), Clotting disorder (HCC) (2013), Colon polyp (2013), Deep vein thrombosis (HCC) (2013), Diverticulosis (2013), GERD (gastroesophageal reflux disease) (2016), HL (hearing loss) (2016), Hyperlipidemia (2013), Hypertension, Joint pain (2013), Low back pain (2013), Neuromuscular disorder (HCC) (2015), Osteopenia (2014), Osteoporosis, Ovarian cancer (HCC), Ovarian cancer on left (HCC) (1995), Pneumonia (2010), Spinal stenosis (2013), and Urinary tract infection (2013).     PCP: Noemy Queen MD       I have reviewed and confirmed the accuracy of the patient's history: Chief complaint, HPI, ROS and Subjective as entered by the MA/LPN/RN. Cindy Ball MD 12/13/21     12/10/2021:         History of present illness was reviewed and is unchanged from the previous visit. 12/10/21      Subjective      Patient seen. Requiring high doses of Oxygen therapy.    ROS:    Review  of Systems   Constitutional: Positive for fatigue. Negative for activity change, appetite change, chills, diaphoresis, fever and unexpected weight change.   HENT: Negative for congestion, dental problem, ear discharge, ear pain, facial swelling, hearing loss, mouth sores, nosebleeds, sore throat, tinnitus, trouble swallowing and voice change.    Eyes: Negative for photophobia and visual disturbance.   Respiratory: Positive for cough and shortness of breath. Negative for choking, chest tightness and wheezing.    Cardiovascular: Negative for chest pain, palpitations and leg swelling.   Gastrointestinal: Negative for abdominal distention, abdominal pain, constipation, diarrhea, nausea and vomiting.   Endocrine: Negative.    Genitourinary: Negative for decreased urine volume, difficulty urinating, dysuria, flank pain, frequency, hematuria and urgency.   Musculoskeletal: Negative for back pain, gait problem, joint swelling, myalgias, neck pain and neck stiffness.   Skin: Negative for color change, rash and wound.   Neurological: Positive for weakness. Negative for dizziness, tremors, syncope, speech difficulty, numbness and headaches.   Hematological: Negative.    Psychiatric/Behavioral: Negative.         MEDICATIONS:      Scheduled Meds:  budesonide, 0.5 mg, Nebulization, BID - RT  calcium 500 mg vitamin D 5 mcg (200 UT), 1 tablet, Oral, BID  cefTRIAXone, 1 g, Intravenous, Q24H  cetirizine, 10 mg, Oral, Nightly  enoxaparin, 1 mg/kg, Subcutaneous, Q12H  erythromycin, , Right Eye, 4x Daily  famotidine, 40 mg, Oral, QAM AC  fluconazole, 400 mg, Oral, Q24H  folic acid, 1,000 mcg, Oral, Daily  guaiFENesin, 1,200 mg, Oral, BID  ipratropium-albuterol, 3 mL, Nebulization, 4x Daily - RT  letrozole, 2.5 mg, Oral, Daily  magnesium oxide, 400 mg, Oral, TID  montelukast, 10 mg, Oral, Nightly  phosphorus, 250 mg, Oral, TID  potassium chloride, 20 mEq, Oral, BID With Meals  pregabalin, 100 mg, Oral, TID  senna-docusate sodium, 2  "tablet, Oral, BID  sertraline, 50 mg, Oral, Nightly  sodium chloride, 3 mL, Intravenous, Q12H  triamterene-hydrochlorothiazide, 1 tablet, Oral, Daily  warfarin, 4 mg, Oral, Daily       Continuous Infusions:  Pharmacy to Dose enoxaparin (LOVENOX),   Pharmacy to dose warfarin,   sodium chloride, 75 mL/hr, Last Rate: 75 mL/hr (12/12/21 0017)       PRN Meds:  •  acetaminophen **OR** acetaminophen **OR** acetaminophen  •  albuterol sulfate HFA  •  senna-docusate sodium **AND** polyethylene glycol **AND** bisacodyl **AND** bisacodyl  •  ipratropium-albuterol  •  magnesium sulfate **OR** magnesium sulfate in D5W 1g/100mL (PREMIX) **OR** magnesium sulfate  •  melatonin  •  ondansetron **OR** ondansetron  •  oxyCODONE  •  Pharmacy to Dose enoxaparin (LOVENOX)  •  Pharmacy to dose warfarin  •  potassium chloride **OR** potassium chloride **OR** potassium chloride  •  sodium chloride  •  sodium chloride  •  temazepam     ALLERGIES:      Allergies   Allergen Reactions   • Morphine Nausea Only and Hives   • Penicillin V Potassium Rash   • Sulfa Antibiotics Rash   • Levaquin [Levofloxacin] Nausea Only and Dizziness     When taken with Coumadin   • Amoxicillin Rash       Objective    VITALS:   /81 (BP Location: Right arm, Patient Position: Lying)   Pulse 91   Temp 98.3 °F (36.8 °C) (Oral)   Resp 20   Ht 165.1 cm (65\")   Wt 69.9 kg (154 lb)   LMP  (LMP Unknown)   SpO2 97%   BMI 25.63 kg/m²     PHYSICAL EXAM:     Physical Exam  Vitals and nursing note reviewed. Exam conducted with a chaperone present.   Constitutional:       General: She is not in acute distress.     Appearance: Normal appearance. She is obese. She is ill-appearing. She is not toxic-appearing or diaphoretic.   HENT:      Head: Normocephalic and atraumatic.      Right Ear: Tympanic membrane, ear canal and external ear normal.      Left Ear: Tympanic membrane, ear canal and external ear normal.      Nose: Nose normal. No congestion or rhinorrhea.      " Mouth/Throat:      Mouth: Mucous membranes are moist.      Pharynx: Oropharynx is clear.   Eyes:      General:         Right eye: Discharge present.         Left eye: No discharge.      Extraocular Movements: Extraocular movements intact.      Pupils: Pupils are equal, round, and reactive to light.      Comments: Improved- slight redness   Neck:      Vascular: No carotid bruit.   Cardiovascular:      Rate and Rhythm: Regular rhythm. Tachycardia present.      Pulses: Normal pulses.      Heart sounds: Normal heart sounds. No murmur heard.  No friction rub. No gallop.    Pulmonary:      Effort: Pulmonary effort is normal. No respiratory distress.      Breath sounds: No stridor. Wheezing and rhonchi present. No rales.   Chest:      Chest wall: No tenderness.   Abdominal:      General: Abdomen is flat. Bowel sounds are normal. There is no distension.      Palpations: Abdomen is soft. There is no mass.      Tenderness: There is no abdominal tenderness. There is no guarding or rebound.      Hernia: No hernia is present.   Genitourinary:     Comments: deferred  Musculoskeletal:         General: No swelling, tenderness or signs of injury. Normal range of motion.      Cervical back: Normal range of motion and neck supple. No rigidity or tenderness.   Lymphadenopathy:      Cervical: No cervical adenopathy.   Skin:     General: Skin is warm and dry.      Capillary Refill: Capillary refill takes less than 2 seconds.      Coloration: Skin is not jaundiced.      Findings: No erythema or rash.      Comments: S/p Left subclavian port removal- left chest incision   Neurological:      General: No focal deficit present.      Mental Status: She is alert and oriented to person, place, and time. Mental status is at baseline.      Cranial Nerves: No cranial nerve deficit.      Sensory: No sensory deficit.      Motor: No weakness.      Coordination: Coordination normal.      Gait: Gait normal.      Deep Tendon Reflexes: Reflexes normal.    Psychiatric:         Mood and Affect: Mood normal.         Behavior: Behavior normal.         Thought Content: Thought content normal.         Judgment: Judgment normal.         I have reexamined the patient and the results are consistent with the previously documented exam. Cindy Ball MD     RECENT LABS:    Lab Results (last 24 hours)     Procedure Component Value Units Date/Time    Blood Culture - Blood, Arm, Left [[id number]]  (Normal) Collected: 12/10/21 1414    Specimen: Blood from Arm, Left Updated: 12/13/21 1515     Blood Culture No growth at 3 days    Magnesium [[id number]]  (Abnormal) Collected: 12/13/21 0446    Specimen: Blood Updated: 12/13/21 0848     Magnesium 1.2 mg/dL     Anaerobic Culture - Wound, Chest, Left [[id number]]  (Normal) Collected: 12/10/21 1148    Specimen: Wound from Chest, Left Updated: 12/13/21 0827     Anaerobic Culture No anaerobes isolated at 3 days    Wound Culture - Wound, Chest, Left [[id number]] Collected: 12/10/21 1148    Specimen: Wound from Chest, Left Updated: 12/13/21 0631     Wound Culture No growth at 3 days     Gram Stain Rare (1+) WBCs per low power field      No organisms seen    Basic Metabolic Panel [[id number]]  (Abnormal) Collected: 12/13/21 0446    Specimen: Blood Updated: 12/13/21 0613     Glucose 103 mg/dL      BUN 16 mg/dL      Creatinine 0.54 mg/dL      Sodium 136 mmol/L      Potassium 4.3 mmol/L      Chloride 101 mmol/L      CO2 25.0 mmol/L      Calcium 6.7 mg/dL      eGFR Non African Amer 113 mL/min/1.73      BUN/Creatinine Ratio 29.6     Anion Gap 10.0 mmol/L     Narrative:      GFR Normal >60  Chronic Kidney Disease <60  Kidney Failure <15      Protime-INR [[id number]]  (Abnormal) Collected: 12/13/21 0446    Specimen: Blood Updated: 12/13/21 0604     Protime 12.6 Seconds      INR 1.15    CBC & Differential [[id number]]  (Abnormal) Collected: 12/13/21 0446    Specimen: Blood Updated: 12/13/21 0553    Narrative:      The following orders were created  for panel order CBC & Differential.  Procedure                               Abnormality         Status                     ---------                               -----------         ------                     CBC Auto Differential[[id number]]        Abnormal            Final result                 Please view results for these tests on the individual orders.    CBC Auto Differential [[id number]]  (Abnormal) Collected: 12/13/21 0446    Specimen: Blood Updated: 12/13/21 0553     WBC 8.80 10*3/mm3      RBC 3.22 10*6/mm3      Hemoglobin 8.3 g/dL      Hematocrit 26.0 %      MCV 80.9 fL      MCH 25.9 pg      MCHC 32.0 g/dL      RDW 19.0 %      RDW-SD 54.7 fl      MPV 8.8 fL      Platelets 161 10*3/mm3      Neutrophil % 85.6 %      Lymphocyte % 5.5 %      Monocyte % 7.6 %      Eosinophil % 1.0 %      Basophil % 0.3 %      Neutrophils, Absolute 7.60 10*3/mm3      Lymphocytes, Absolute 0.50 10*3/mm3      Monocytes, Absolute 0.70 10*3/mm3      Eosinophils, Absolute 0.10 10*3/mm3      Basophils, Absolute 0.00 10*3/mm3      nRBC 0.1 /100 WBC           PENDING RESULTS:     IMAGING REVIEWED:  XR Chest 1 View    Result Date: 12/11/2021  1. Patchy bilateral airspace opacities likely representing multifocal pneumonia. No significant change from prior exam dated 12/08/2021. 2. Removal of the left-sided chest port.  Electronically Signed By-Cristian Pennington MD On:12/11/2021 5:49 PM This report was finalized on [id number] by  Cristian Pennington MD.      Assessment/Plan      ASSESSMENT:    Recurrent ovarian cancer with metastasis to brain, colon, abdominal wall and pulmonary involvement: She has had  Multiple lines of therapy in the past but most recently she is on Letrozol. Patient is currently on  Palliative XRT to the whole  brain         Chemotherapy history:    Doxorubicin Liposomal/Carboplatin: received 16 cycles: 2/15/2019 to 6/11/2020 : life time dose/progression of disease  Topotecan weekly: received 3 cycles : 6/2020 to  12/17/2020: progression of disease  Pemetrexed: Received 3 cycles: 12/23/20 to 3/10/2021: progression of disease  Cisplatin/Gemcitabine:  Received 2 cycles: 3/12/2021 to 5/7/2021- progression of disease  Denosumab (Xgeva) given every 28 days: Received 6 cycles out of 12 cycles- 2/1/21 to 7/16/21  Letrozole (Femara): 2.5mg by mouth daily: Started 9/22/2021: active treatment     2. 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 Will continue the same. ID is following.  Patient does not want any invasive testing. Patient requiring high doses of Oxygen therapy.  3. Coumadin toxicity: INR > 8.  Holding coumadin was given Vitamin K in ED. Monitoring INR and coumadin  Per pharmacy: INR remains subtherapeutic.  I have increased warfarin to 4 mg p.o. daily  4. Hypokalemia: replaced per primary team: on oral supplement  5. History of DVT: on anticoagulation with Coumadin- previously held due to coumadin toxicity  6. Pancytopenia: due to chemotherapy: on Procrit  7. Iron deficiency anemia: Hemoglobin 8.3, HCT 27.4, MCV 81.1, MCH 26.4  8. Hypomagnesemia: replaced per primary team  9. Right eye bacterial conjunctivitis: will order Erythromycin eye gtts.   10. Candidemia, PCR detected Candida albicans due to port: s/p port removal. Patient getting IV micafungin, IV rocephin     PLANS    1. S/P left subclavian port removal per general surgery  2. IV ABT rocephin , micafungin  3. Increased warfarin to 4 mg p.o. daily  4. Daily  CBC and PT/INR  5. Blood transfusion: Administer 1 unit of PRBC's for hemoglobin <7.5  6. Monitor for bleeding  7. Continue Femara   8. Continue Erythromycin ophthalmic 0.5% gtts- give to right eye four times a day x7 days  9.  Continue supportive care  10.  Discussed with patient  11. Disposition to skilled nursing facility.  12. Recommend palliative care consult as well.         Electronically signed by Cindy Ball MD,  12/13/21, 4:59 PM EST.

## 2021-12-13 NOTE — NURSING NOTE
Patient picked up by [facility] for transport to [facility]. No complaints of pain or SOA. VSS See Flow sheet.

## 2021-12-13 NOTE — CASE MANAGEMENT/SOCIAL WORK
Continued Stay Note  LYNNETTE Rees     Patient Name: Tahira Zimmerman  MRN: [id number]  Today's Date: 12/13/2021    Admit Date: 12/8/2021     Discharge Plan     Row Name 12/13/21 1139       Plan    Plan [facility], accepted. Facility to complete PASRR. No precert required.    Plan Comments Received notice from [place] liaison that patient is accepted and has a bed available today. She called and informed patients daughter. Updated patient at bedside, she remains agreeable. Updated MD and RN via secure chat. Pharmacy updated.              Met with patient in room wearing PPE: mask    Maintained distance greater than six feet and spent less than 15 minutes in the room.            Kayy Villanueva RN

## 2021-12-13 NOTE — THERAPY TREATMENT NOTE
Subjective: Pt agreeable to therapeutic plan of care.    Objective:     Bed mobility - Modified-Independent  Transfers - SBA  Ambulation - 20 feet CGA and FWW    Pain: 0 VAS  Education: Provided education on importance of mobility and skilled verbal / tactile cueing throughout intervention.     Assessment: Tahira Zimmerman presents with functional mobility impairments which indicate the need for skilled intervention. Pt completed all bed mobility tasks with Mod I and amb 1x20ft with FWW and CGA. Pt having difficulty advancing LLE while amb and states that it feel very weak. Pt would benefit from inpatient rehab placement to further increase mm strength, endurance, and overall mobility at this time. Tolerating session today without incident. Will continue to follow and progress as tolerated.     Plan/Recommendations:   Pt would benefit from Inpatient Rehabilitation placement at discharge from facility and requires rolling walker at discharge.   Pt desires Inpatient Rehabilitation placement at discharge. Pt cooperative; agreeable to therapeutic recommendations and plan of care.     Basic Mobility 6-click:  Rollin = Total, A lot = 2, A little = 3; 4 = None  Supine>Sit:   1 = Total, A lot = 2, A little = 3; 4 = None   Sit>Stand with arms:  1 = Total, A lot = 2, A little = 3; 4 = None  Bed>Chair:   1 = Total, A lot = 2, A little = 3; 4 = None  Ambulate in room:  1 = Total, A lot = 2, A little = 3; 4 = None  3-5 Steps with railin = Total, A lot = 2, A little = 3; 4 = None  Score: 16    Modified [place]: N/A = No pre-op stroke/TIA    Post-Tx Position: Supine with HOB Elevated and call light in reach.  PPE: gloves, surgical mask, eyewear protection

## 2021-12-13 NOTE — PROGRESS NOTES
"Pharmacy dosing service  Anticoagulant  Warfarin     Subjective:    Tahira Zimmerman is a 66 y.o.female being continued on warfarin for atrial fibrillation.    INR Goal: 2 - 3  Home medication?: Yes, warfarin 4 mg PO every day at 1800 for the last week.  Prior patient was on warfarin 5mg daily.  Bridge Therapy Present?:  Yes,  Enoxaparin 70 mg SQ Q12H  Interacting Medications Evaluation (New/Present/Discontinued): luis fernando  Additional Contributing Factors: Bacteremia       Assessment/Plan:    Patient admitted with supratherapeutic INR.  Vitamin K 10mg sq given x1 on 12/8.  Home dose for the last week PTA was warfarin 4mg daily, per patient report.  Warfarin restarted 12/9 after INR dropped back to therapeutic level.  It will take some time to overcome the effects of the vitamin K before we start to see a rise in INR (expect just now starting to move past the effect of vitamin K). Treatment lovenox started 12/10.  3mg dose planned for 12/10 was missed. Pt received 3 mg dose 12/11. Per Dr. Ball, started 4mg daily last night. Expect to see INR begin to respond in the next couple of days. OK to dc lovenox once INR is therapeutic.    Continue to monitor and adjust based on INR.         Date 12/9 12/10 12/11 12/12 12/13       INR 2.23 1.27 1.24 1.19 1.15       Dose 2mg Not ordered 3mg 4mg 4mg           Objective:  [Ht: 165.1 cm (65\"); Wt: 69.9 kg (154 lb); BMI: Body mass index is 25.63 kg/m².]    Lab Results   Component Value Date    ALBUMIN 2.30 (L) 12/12/2021     Lab Results   Component Value Date    INR 1.15 (L) 12/13/2021    INR 1.19 (L) 12/12/2021    INR 1.24 (L) 12/11/2021    PROTIME 12.6 (L) 12/13/2021    PROTIME 13.0 (L) 12/12/2021    PROTIME 13.5 (L) 12/11/2021     Lab Results   Component Value Date    HGB 8.3 (L) 12/13/2021    HGB 9.3 (L) 12/12/2021    HGB 8.6 (L) 12/11/2021     Lab Results   Component Value Date    HCT 26.0 (L) 12/13/2021    HCT 29.1 (L) 12/12/2021    HCT 26.6 (L) 12/11/2021       Marysol" Rosaura, PharmD  12/13/21 08:35 EST

## 2021-12-13 NOTE — PROGRESS NOTES
Infectious Diseases Progress Note      LOS: 5 days   Patient Care Team:  Noemy Queen MD as PCP - General (Family Medicine)  Cindy Ball MD as Consulting Physician (Hematology and Oncology)    Chief Complaint: Shortness of breath    Subjective       The patient has been afebrile for the last 24 hours.  The patient is currently on 5 L oxygen via nasal cannula.  Patient denied having any new complaints today      Review of Systems:   Review of Systems   Constitutional: Positive for fatigue.   HENT: Negative.    Eyes: Negative.    Respiratory: Positive for shortness of breath.    Cardiovascular: Negative.    Gastrointestinal: Negative.    Endocrine: Negative.    Genitourinary: Negative.    Musculoskeletal: Negative.    Skin: Negative.    Neurological: Negative.    Psychiatric/Behavioral: Negative.    All other systems reviewed and are negative.       Objective     Vital Signs  Temp:  [97.8 °F (36.6 °C)-98.8 °F (37.1 °C)] 97.8 °F (36.6 °C)  Heart Rate:  [79-96] 87  Resp:  [16-20] 20  BP: (101-127)/(64-76) 117/74    Physical Exam:  Physical Exam  Vitals and nursing note reviewed.   Constitutional:       General: She is not in acute distress.     Appearance: Normal appearance. She is well-developed and normal weight. She is not diaphoretic.   HENT:      Head: Normocephalic and atraumatic.   Eyes:      General: No scleral icterus.     Extraocular Movements: Extraocular movements intact.      Conjunctiva/sclera: Conjunctivae normal.      Pupils: Pupils are equal, round, and reactive to light.   Cardiovascular:      Rate and Rhythm: Normal rate and regular rhythm.      Heart sounds: Normal heart sounds, S1 normal and S2 normal. No murmur heard.      Pulmonary:      Effort: Pulmonary effort is normal. No respiratory distress.      Breath sounds: No stridor. Rales present. No wheezing.   Chest:      Chest wall: No tenderness.   Abdominal:      General: Bowel sounds are normal. There is no distension.       Palpations: Abdomen is soft. There is no mass.      Tenderness: There is no abdominal tenderness. There is no guarding.   Musculoskeletal:         General: No swelling, tenderness or deformity. Normal range of motion.      Cervical back: Neck supple.   Skin:     General: Skin is warm and dry.      Coloration: Skin is not pale.      Findings: No bruising, erythema or rash.      Comments: Multiple skin nodules noticed on the head and 1 large nodule in the left anterior chest     Port has been removed   Neurological:      General: No focal deficit present.      Mental Status: She is alert and oriented to person, place, and time. Mental status is at baseline.      Cranial Nerves: No cranial nerve deficit.   Psychiatric:         Mood and Affect: Mood normal.          Results Review:    I have reviewed all clinical data, test, lab, and imaging results.     Radiology  No Radiology Exams Resulted Within Past 24 Hours    Cardiology    Laboratory    Results from last 7 days   Lab Units 12/13/21  0446 12/12/21  1048 12/11/21  0552 12/10/21  0537 12/09/21  0529 12/08/21  1910   WBC 10*3/mm3 8.80 8.50 7.60 7.40 5.90 4.20   HEMOGLOBIN g/dL 8.3* 9.3* 8.6* 8.3* 8.8* 9.6*   HEMATOCRIT % 26.0* 29.1* 26.6* 25.6* 27.4* 29.6*   PLATELETS 10*3/mm3 161 195 183 203 232 222     Results from last 7 days   Lab Units 12/13/21  0446 12/12/21  1048 12/11/21  0552 12/10/21  0537 12/09/21  0529 12/08/21  1910   SODIUM mmol/L 136 136 136 136 134* 134*   POTASSIUM mmol/L 4.3 4.4 4.4 3.9 4.1 3.3*   CHLORIDE mmol/L 101 99 101 101 99 97*   CO2 mmol/L 25.0 25.0 25.0 25.0 26.0 25.0   BUN mg/dL 16 17 21 24* 29* 31*   CREATININE mg/dL 0.54* 0.60 0.71 0.73 0.95 1.01*   GLUCOSE mg/dL 103* 107* 104* 86 120* 155*   ALBUMIN g/dL  --  2.30*  --  2.40*  --  2.60*   BILIRUBIN mg/dL  --  0.2  --  0.2  --  0.3   ALK PHOS U/L  --  189*  --  181*  --  209*   AST (SGOT) U/L  --  25  --  26  --  22   ALT (SGPT) U/L  --  14  --  13  --  10   CALCIUM mg/dL 6.7* 7.2* 6.7*  6.7* 6.8* 7.0*                 Microbiology   Microbiology Results (last 10 days)     Procedure Component Value - Date/Time    Blood Culture - Blood, Arm, Left [[id number]]  (Normal) Collected: 12/10/21 1414    Lab Status: Preliminary result Specimen: Blood from Arm, Left Updated: 12/13/21 1515     Blood Culture No growth at 3 days    Anaerobic Culture - Wound, Chest, Left [[id number]]  (Normal) Collected: 12/10/21 1148    Lab Status: Preliminary result Specimen: Wound from Chest, Left Updated: 12/13/21 0827     Anaerobic Culture No anaerobes isolated at 3 days    Wound Culture - Wound, Chest, Left [[id number]] Collected: 12/10/21 1148    Lab Status: Final result Specimen: Wound from Chest, Left Updated: 12/13/21 0631     Wound Culture No growth at 3 days     Gram Stain Rare (1+) WBCs per low power field      No organisms seen    Blood Culture - Blood, Chest, Left [[id number]]  (Abnormal) Collected: 12/08/21 1946    Lab Status: Final result Specimen: Blood from Chest, Left Updated: 12/11/21 0648     Blood Culture Candida albicans     Comment: Infectious disease consultation is highly recommended to rule out distant foci of infection.        Isolated from Aerobic Bottle     Gram Stain Aerobic Bottle Yeast    Narrative:      Refer to previous blood culture collected on 12/8/2021 1910 for MICs    Blood Culture - Blood, Chest, Left [[id number]]  (Abnormal)  (Susceptibility) Collected: 12/08/21 1910    Lab Status: Final result Specimen: Blood from Chest, Left Updated: 12/11/21 0648     Blood Culture Candida albicans     Comment: Infectious disease consultation is highly recommended to rule out distant foci of infection.        Isolated from Aerobic and Anaerobic Bottles     Gram Stain Aerobic Bottle Yeast      Anaerobic Bottle Yeast    Susceptibility      Candida albicans     OSORIO     Fluconazole Susceptible     Micafungin Susceptible                         Respiratory Panel PCR w/COVID-19(SARS-CoV-2)  JAMAL/ROLF/ABENA/PAD/COR/MAD/MICHAEL In-House, NP Swab in UTM/VTM, 3-4 HR TAT - Swab, Nasopharynx [[id number]]  (Normal) Collected: 12/08/21 1910    Lab Status: Final result Specimen: Swab from Nasopharynx Updated: 12/08/21 2029     ADENOVIRUS, PCR Not Detected     Coronavirus 229E Not Detected     Coronavirus HKU1 Not Detected     Coronavirus NL63 Not Detected     Coronavirus OC43 Not Detected     COVID19 Not Detected     Human Metapneumovirus Not Detected     Human Rhinovirus/Enterovirus Not Detected     Influenza A PCR Not Detected     Influenza B PCR Not Detected     Parainfluenza Virus 1 Not Detected     Parainfluenza Virus 2 Not Detected     Parainfluenza Virus 3 Not Detected     Parainfluenza Virus 4 Not Detected     RSV, PCR Not Detected     Bordetella pertussis pcr Not Detected     Bordetella parapertussis PCR Not Detected     Chlamydophila pneumoniae PCR Not Detected     Mycoplasma pneumo by PCR Not Detected    Narrative:      In the setting of a positive respiratory panel with a viral infection PLUS a negative procalcitonin without other underlying concern for bacterial infection, consider observing off antibiotics or discontinuation of antibiotics and continue supportive care. If the respiratory panel is positive for atypical bacterial infection (Bordetella pertussis, Chlamydophila pneumoniae, or Mycoplasma pneumoniae), consider antibiotic de-escalation to target atypical bacterial infection.    Blood Culture ID, PCR - Blood, Chest, Left [[id number]]  (Abnormal) Collected: 12/08/21 1910    Lab Status: Final result Specimen: Blood from Chest, Left Updated: 12/09/21 1422     BCID, PCR Candida albicans. Identification by BCID2 PCR     BOTTLE TYPE Aerobic Bottle    Narrative:      Infectious disease consultation is highly recommended to rule out distant foci of infection.          Medication Review:       Schedule Meds  budesonide, 0.5 mg, Nebulization, BID - RT  calcium 500 mg vitamin D 5 mcg (200 UT), 1 tablet, Oral,  BID  cefTRIAXone, 1 g, Intravenous, Q24H  cetirizine, 10 mg, Oral, Nightly  enoxaparin, 1 mg/kg, Subcutaneous, Q12H  erythromycin, , Right Eye, 4x Daily  famotidine, 40 mg, Oral, QAM AC  fluconazole, 400 mg, Oral, Q24H  folic acid, 1,000 mcg, Oral, Daily  guaiFENesin, 1,200 mg, Oral, BID  ipratropium-albuterol, 3 mL, Nebulization, 4x Daily - RT  letrozole, 2.5 mg, Oral, Daily  magnesium oxide, 400 mg, Oral, TID  montelukast, 10 mg, Oral, Nightly  phosphorus, 250 mg, Oral, TID  potassium chloride, 20 mEq, Oral, BID With Meals  pregabalin, 100 mg, Oral, TID  senna-docusate sodium, 2 tablet, Oral, BID  sertraline, 50 mg, Oral, Nightly  sodium chloride, 3 mL, Intravenous, Q12H  triamterene-hydrochlorothiazide, 1 tablet, Oral, Daily  warfarin, 4 mg, Oral, Daily        Infusion Meds  Pharmacy to Dose enoxaparin (LOVENOX),   Pharmacy to dose warfarin,   sodium chloride, 75 mL/hr, Last Rate: 75 mL/hr (12/12/21 0017)        PRN Meds  •  acetaminophen **OR** acetaminophen **OR** acetaminophen  •  albuterol sulfate HFA  •  senna-docusate sodium **AND** polyethylene glycol **AND** bisacodyl **AND** bisacodyl  •  ipratropium-albuterol  •  magnesium sulfate **OR** magnesium sulfate in D5W 1g/100mL (PREMIX) **OR** magnesium sulfate  •  melatonin  •  ondansetron **OR** ondansetron  •  oxyCODONE  •  Pharmacy to Dose enoxaparin (LOVENOX)  •  Pharmacy to dose warfarin  •  potassium chloride **OR** potassium chloride **OR** potassium chloride  •  sodium chloride  •  sodium chloride  •  temazepam        Assessment/Plan       Antimicrobial Therapy   1.  Micafungin        2.  Rocephin        3.        4.        5.            Assessment     Candidemia with Candida albicans.  Most likely secondary to port infection  -Port was removed on 12/10/2021     Dyspnea and hypoxia.  Chest x-ray showed interstitial lung infiltrates.  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  Patient also have multiple skin nodules concerning for metastasis.  Patient was on oral chemotherapy prior to admission     The patient is s/p port placement in the past     Plan     Discontinue IV micafungin  Diflucan 400 mg p.o. daily for 11 days, there will be a total of 2 weeks of antifungal treatment for candidemia  Can discontinue ceftriaxone on discharge  Long-term prognosis is poor secondary to advanced and metastatic ovarian cancer  Okay to discharge patient from infectious disease point       Cony Shelby MD  12/13/21  15:57 EST    Note is dictated utilizing voice recognition software/Dragon

## 2021-12-20 NOTE — TELEPHONE ENCOUNTER
Hub is instructed to read the documentation below to patient  ATTEMPTED TO CONTACT DANIELLE, PATIENTS'S DAUGHTER, REGARDING HOSPITAL F/U.  NO ANSWER.  LMV ASKING HER TO CALL BACK.

## 2021-12-22 NOTE — TELEPHONE ENCOUNTER
From: Tahira Zimmerman  To: Fito Ram MD  Sent: 12/21/2021 1:30 PM EST  Subject: Refill    I need a refill on my oxycodone please.

## 2021-12-23 NOTE — OUTREACH NOTE
Ambulatory Case Management Note    SNF Follow-up    Questions/Answers      Responses   Acute Facility Discharged From [facility]   Acute Discharge Date 12/13/21   Name of the Skilled Nursing Facility? [facility]   Purpose of SNF Admission PT,  OT,  SN   Estimated length of stay for the patient? TBD   Who is the insurance provider or payor of patient stay? Medicare   Progression of Patient? daily therapies continue        RN-ACM outreach call made to [facility]. Staff Pennie reports pt is still there. SW dept unavailable for additional information. RN-ACM will continue to monitor for discharge.    Vanesa Carlson RN  Ambulatory Case Management    12/23/2021, 09:29 EST

## 2022-01-01 ENCOUNTER — OFFICE VISIT (OUTPATIENT)
Dept: PAIN MEDICINE | Facility: CLINIC | Age: 67
End: 2022-01-01

## 2022-01-01 ENCOUNTER — PATIENT OUTREACH (OUTPATIENT)
Dept: CASE MANAGEMENT | Facility: OTHER | Age: 67
End: 2022-01-01

## 2022-01-01 ENCOUNTER — TRANSITIONAL CARE MANAGEMENT TELEPHONE ENCOUNTER (OUTPATIENT)
Dept: CALL CENTER | Facility: HOSPITAL | Age: 67
End: 2022-01-01

## 2022-01-01 ENCOUNTER — READMISSION MANAGEMENT (OUTPATIENT)
Dept: CALL CENTER | Facility: HOSPITAL | Age: 67
End: 2022-01-01

## 2022-01-01 VITALS — HEIGHT: 65 IN | BODY MASS INDEX: 20.49 KG/M2 | WEIGHT: 123 LBS

## 2022-01-01 DIAGNOSIS — M47.812 OSTEOARTHRITIS OF CERVICAL SPINE WITHOUT MYELOPATHY: ICD-10-CM

## 2022-01-01 DIAGNOSIS — M79.605 LEG PAIN, BILATERAL: ICD-10-CM

## 2022-01-01 DIAGNOSIS — Z79.899 OTHER LONG TERM (CURRENT) DRUG THERAPY: ICD-10-CM

## 2022-01-01 DIAGNOSIS — M79.7 FIBROMYOSITIS: ICD-10-CM

## 2022-01-01 DIAGNOSIS — G89.3 PAIN OF METASTATIC MALIGNANCY: Primary | ICD-10-CM

## 2022-01-01 DIAGNOSIS — M50.10 CERVICAL DISC DISORDER WITH RADICULOPATHY: ICD-10-CM

## 2022-01-01 DIAGNOSIS — M54.2 NECK PAIN: ICD-10-CM

## 2022-01-01 DIAGNOSIS — M79.604 LEG PAIN, BILATERAL: ICD-10-CM

## 2022-01-01 PROCEDURE — 99441 PR PHYS/QHP TELEPHONE EVALUATION 5-10 MIN: CPT | Performed by: PHYSICAL MEDICINE & REHABILITATION

## 2022-01-01 RX ORDER — OXYCODONE HYDROCHLORIDE 30 MG/1
30 TABLET ORAL EVERY 4 HOURS PRN
Qty: 180 TABLET | Refills: 0 | Status: SHIPPED | OUTPATIENT
Start: 2022-01-01

## 2022-01-11 NOTE — OUTREACH NOTE
Prep Survey      Responses   [facility] patient discharged from? Non-[facility]   Is LACE score < 7 ? Non-BH Discharge   Emergency Room discharge w/ pulse ox? No   Eligibility [facility]   Date of Admission 12/13/21   Date of Discharge 01/11/22   Discharge diagnosis Unavailable/ Ovarian CA w/ Mets   Does the patient have one of the following disease processes/diagnoses(primary or secondary)? Other   Prep survey completed? Yes          Gina Mary RN

## 2022-01-11 NOTE — PROGRESS NOTES
"Subjective   Tahira Zimmerman is a 66 y.o. female.     neck and shoulder pain, all over aching \"bone\" due to chemo--also left rotator cuff tear also broke out with shingles-under breast on back and on head. 9/10 at worst, 2/10 at best. Nonradiating. Always present, varies, interferes with daily activities. Imaging shows metastatic disease. Lengthy prior notes reviwed, treated for metastatic disease, failed Tramadol, cannot tolerate Hydrocodone, could tolerate Oxycodone 5mg, taking BID, also Lyrica 75mg BID. Started new chemo, worsening pain in b/l hips and legs, especially at night. Now taking Oxycodone 10mg TID prn and Lyrica 100mg BID with good relief. Started another new chemo with worsening pain, on a break while insurance approves pill form with improved pain. New chemo pill lowered Mg, holding it while getting Mg infusions. Shingles outbreak. Started another new chemo, has aches controlled by current meds. Had fall, 2 \"somethings\" found on her brain on imaging. Holding chemo with PNA, on ABX. Told she likely has < 6 months left.       The following portions of the patient's history were reviewed and updated as appropriate: allergies, current medications, past family history, past medical history, past social history, past surgical history and problem list.    Review of Systems   Constitutional: Negative for chills, fatigue and fever.   HENT: Positive for hearing loss. Negative for trouble swallowing.    Eyes: Negative for visual disturbance.   Respiratory: Negative for shortness of breath.    Cardiovascular: Negative for chest pain.   Gastrointestinal: Positive for abdominal pain and nausea. Negative for constipation, diarrhea and vomiting.   Genitourinary: Negative for urinary incontinence.   Musculoskeletal: Positive for arthralgias, back pain and neck pain. Negative for joint swelling and myalgias.   Neurological: Positive for headache. Negative for dizziness, weakness and numbness.       Objective "   Physical Exam   Constitutional: She is oriented to person, place, and time. She appears well-developed and well-nourished.   Eyes: Pupils are equal, round, and reactive to light.   Neurological: She is alert and oriented to person, place, and time. She has normal reflexes.   Psychiatric: Her behavior is normal. Mood, judgment and thought content normal.         Assessment/Plan   Diagnoses and all orders for this visit:    1. Pain of metastatic malignancy (Primary)    2. Cervical disc disorder with radiculopathy    3. Fibromyositis    4. Leg pain, bilateral    5. Neck pain    6. Osteoarthritis of cervical spine without myelopathy    7. Other long term (current) drug therapy        INspect reviewed, in orderLow risk. Repeat drug screen 10/6/21 in order.  Increased to Oxycodone 10mg 1/2 - 1 tab q4h prn, increased to Oxycodone 20mg q4h prn, then 30mg q4h prn, cannot tolerate Hydrocodone, failed Tramadol. May need to increase in future but currently adequate.  Increased to Lyrica 100mg TID for worsening pain. More effective than Gabapentin, already taking antidepressants so no plans for beginning Cymbalta.  Cont RxAlt shingles cream prn.  Cont other meds as prescribed.  No plans for procedures or TENS with metastatic disease.  RTC 3 months for f/u. Telephone visit, spent 5 minutes discussing her pain, medications, and prognosis.

## 2022-01-12 NOTE — OUTREACH NOTE
Call Center TCM Note      Responses   [facility] patient discharged from? Non-BH   Does the patient have one of the following disease processes/diagnoses(primary or secondary)? Other   TCM attempt successful? No   Unsuccessful attempts Attempt 2          Melissa Pandey RN    1/12/2022, 13:20 EST

## 2022-01-12 NOTE — OUTREACH NOTE
Call Center TCM Note      Responses   [facility] patient discharged from? Non-BH   Does the patient have one of the following disease processes/diagnoses(primary or secondary)? Other   TCM attempt successful? No   Unsuccessful attempts Attempt 1          Amada Romero MA    1/12/2022, 09:39 EST

## 2022-01-13 NOTE — OUTREACH NOTE
Call Center TCM Note      Responses   [facility] patient discharged from? Non-   Does the patient have one of the following disease processes/diagnoses(primary or secondary)? Other   TCM attempt successful? No   Unsuccessful attempts Attempt 3          Valencia Ching RN    1/13/2022, 11:07 EST

## 2022-01-23 NOTE — PROGRESS NOTES
Hematology/Oncology Outpatient Follow Up    PATIENT NAME:Tahira Zimmerman  :1955  MRN: [id number]  PRIMARY CARE PHYSICIAN: Noemy Queen MD  REFERRING PHYSICIAN: Noemy Queen MD    Chief Complaint   Patient presents with   • Follow-up     Malignant neoplasm of right ovary        HISTORY OF PRESENT ILLNESS:     1. Recurrent ovarian cancer diagnosis established in 2013.  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  The patient was treated with surgery and then postoperatively with adjuvant chemotherapy consisting of Carboplatin and Taxol.  Six months later, she had recurrence of disease intra-abdominally between the rectum and vagina, which was treated with intraabdominal peritoneal chemotherapy.  She had been free of disease since that time.  She reported feeling a mass in the left side of her abdomen approximately six months ago.  This gradually grew and in early 2013 she had her daughter feel the mass.  The daughter works for Dr. David Rogers and the patient at that time also complained of intermittent rectal bleeding.  Consultation with Dr. David Rogers was obtained.  The patient had a CT scan of the abdomen and pelvis performed on 13 revealing left lower quadrant mass measuring 9.7 x 9.3 x 6 cm demonstrating areas of dense calcification, soft tissue density and cystic density within it.  Stromal tumor is felt to be most likely a possibly fibroma.  It is generated with necrosis and calcification.  Possible palpable mass in the rectus muscle is seen with calcification and deformity of the rectus muscle and just below this a second mass intra-abdominally was seen measuring 2 cm in the greatest diameter suspicious for second intraabdominal tumor.  The mass separate from the largest mass measuring 2.6 cm in the dependent portion of pelvis is seen on the right of the midline.  No  retroperitoneal lymphadenopathy was seen.  No free air, free fluid or other abnormality was present.  The patient was scheduled for an outpatient colonoscopy, but had syncopal episode while sitting in the toilet the night before and was brought to the emergency room early in the morning by her daughter and son-in-law.  The patient had apparently stopped breathing for a few seconds and did not have a pulse, but started breathing before CPR could be initiated.  In the emergency room, the patient had an EKG revealing sinus rhythm nonspecific T-wave flattening; metabolic panel revealed a glucose of 148, creatinine of 1.3, CBC was normal.  She was treated with intravenous fluid.  The patient’s case was then discussed with Dr. Anabell Monique and patient was subsequently admitted to [facility].  The patient underwent a colonoscopy by Dr. David Rogers on 06/27/13 revealing sigmoid diverticulosis and grade II internal and external hemorrhoids, but no mass or colitis was seen.  CT-guided biopsy of the mass was performed on 06/27/13 as well revealing metastatic papillary adenocarcinoma with numerous psammoma bodies.  Pathology comment stated prior records were not available; however, with history of ovarian cancer the current biopsy would be consistent with metastases from that malignancy.  Oncology consult was obtained and I initially saw the patient on 06/27/2013 where the patient had claimed to have little bit of pain in the area of disease.  She reported being frequently constipated, denies any weight loss or fevers.  She did report having some night sweats, but thought that was because of her menopause.  On 06/27/13 metastatic workup including labs and CT scans were initiated.  CT scan of the chest on 06/27/13 revealed no acute disease in the chest.  A 1.4 cm size focal area of decreased density was noted in the lateral aspect of the right lobe of the liver consistent with a benign cyst or hemangioma.   There was a very small hiatal hernia measuring 2.2 cm in diameter.  A CT scan of the head performed 06/27/13 revealed no acute intracranial abnormality noted.  CA-125 was 40 units/ml (0-35), CA 19-9 was 11.8 units/ml (0-35), CEA level was 0.8 ng/ml (0-3), TSH level was 1.09 IU/ml (0.34-5.6).  The patient was in workup for the syncopal episode, a carotid Doppler was performed on June 28 revealing an essentially normal study showing a reduction in diameter from 0-15% bilaterally.  A Holter monitor was completed on 06/27/13, which was unremarkable except for a few premature atrial contractions.  The patient was felt stable and subsequently was discharged home on 06/28/13.  Post discharge, the patient was seen at [facility] on 07/05/13 by Dr. Kathryn Thrasher who discussed treatment options with the patient including surgery.  The patient is in the office today 07/16/13 in follow up post hospitalization.  She is reporting that surgery is planned for July 30th of this month at .  · 7/30/13 to 8/3/13 - The patient was in [facility].  The patient was admitted for surgery for her recurrent ovarian cancer.  The patient had exploratory laparotomy, omentectomy, bowel resection, liver biopsy and appendectomy.  Pathology revealed of the omentum metastatic serous carcinoma of the transverse colon, segmental resection showed metastatic serous carcinoma involving mesenteric fat.  The liver wedge resection showed fibrosclerotic thickening of the hepatic capsule.  Benign liver parenchyma.  No evidence of metastatic tumor.  Appendix showed fibrous obliteration of the lumen.  Negative for metastatic carcinoma.  Rectum and sigmoid colon segmental resection showed metastatic serous carcinoma invading the colorectal mucosa uninvolved by tumor.  Vaginal mass showed metastatic serous carcinoma.  Margins are positive for tumor.  · 9/24/13 - Chemotherapy orders were written for patient to start  carboplatin 550 mg IV day one and Taxol 330 mg IV day one to be cycled every three weeks.  · 10/10/13 - The patient started cycle #1 of chemotherapy consisting of Carboplatin and Taxol.  · 09/25/13 -  is 10.6 normal.  · 10/01/13 - CT of the chest, abdomen and pelvis revealed new reticular nodular occupancy in the anterior segment of the right upper lobe, could reflect an inflammation or infectious etiology or could reflect unusual persistence of metastatic tumor.  New liver lesions, the smaller one appears to be implant on the surface of the liver, the larger may also represent an implant including the intrahepatic.  Several small mesenteric nodes seen throughout the abdomen and pelvis.  · 10/02/13 - Port placed by Dr. Sen.  · 10/24/13 - Orders written to start Neupogen daily and if more than three Neupogen are needed to give Neulasta after next chemo.  ANC is 0.15.  · 10/31/14 - Patient given cycle 2 of chemotherapy with Taxol and Carboplatin.  · 11/21/13 - The patient started cycle 3 of chemotherapy consisting of Carboplatin and Taxol.  · 11/26/13 - CT scan of chest, abdomen and pelvis revealed no evidence of active disease in the chest.  Reticulonodular opacity has resolved.  Metastatic lesion impressing upon the hepatic dome similar to prior exam.  No evidence of a change.  No evidence of new disease.  Postsurgical changes in the pelvis and throughout the retroperitoneum in the large bowel.  Finding containing anterior abdominal wall hernia containing fat tissue and enhancing vessels, 3 cm in diameter.  · 12/2/13 - Orders written to start Neupogen per protocol as well as Aranesp due to low hemoglobin and ANC.  ANC is 0.14.  Hemoglobin is 9.7.  · 12/11/13 - Orders written to hold chemotherapy until next week and decrease dose by 20% due to low platelet count.  Platelet count is 85,000.  · 12/16/13 - The patient received cycle 4 of Carboplatin and Taxol at a 20% dose reduction so Taxol dose is 260 mg and  Carboplatin is 440 mg.  · 12/16/13 - CA-125 12.6 (N).  · 12/19/13 - PET/CT scan.  Impression:  1. There is no evidence of hypermetabolism in the liver.  Specifically of the dominant lesion in or adjacent to the right hepatic dome that was seen and described on the recent CT study of 11/26/2013.  It is photopenic relative to the surrounding liver.  2.  There is no evidence of hypermetabolic soft issue mass lesion or free fluid in the abdomen or pelvis.  3.  The tonsillar tissues are slightly enlarged and have moderate activity level.  This maybe physiologic.  Correlation with oral cavity, examination is recommended.  4.  Mild amount of activity in the right level II jugular lymph chain without focally enlarged lymph nodes is of questionable significance.  · 1/6/13 - The patient received cycle 5 of chemotherapy with Taxol and Carboplatin.  · 1/27/14 - The patient started on cycle 6 of Taxol and Carboplatin.  · 2/18/14 - CT of the abdomen and pelvis with contrast; stable lesions impressing upon the hepatic dome, unchanged compared to the prior exam.  Multiple anterior abdominal wall hernias re-demonstrated.  Those above the umbilicus contain omental fat.  Below and to the left of the umbilicus as anterior abdominal hernia containing omental fat in nondilated small bowel which is larger than seen previously.  · 2/20/14 - The patient received cycle 7 of chemotherapy with Taxol and Carboplatin.   · 3/11/14 - Order written to discontinue chemotherapy due to patient has completed 7 cycles of chemotherapy and CT scans suggest possible remission.  · 3/11/14 -  11.0 (N).  · 06/05/14 - CT scan of the chest abdomen and pelvis with contrast: There are multiple anterior abdominal wall hernias.  There are 2 larger anterior abdominal wall hernias at the level of the transverse colon, which contain omental fat.  There are at least 2 small anterior abdominal wall hernias that contain omental fat.  There is a moderate sized  anterior abdominal wall hernia at the level of the umbilicus, eccentric to the left, which contain non-dilated bowel.  Interval decrease in the size of two deposit impressing on the liver.  The third tiny deposit has been stable.  · 8/19/14 -  10.4 (N).  · 12/19/14 -   10.0 (N)  · 1/7/15 - CT chest, abdomen and pelvis with stable appearance of the chest with several micronodules. Small hiatal hernia, slightly larger than previous exam, increased from 1.5 to 2.5 cm in diameter. Bochdalek hernia unchanged. Multiple hepatic lesions. The two dominant lesions at the right hepatic dome had decreased in size from previous. The remaining tiny nodules measured 3-5 mm in diameter and were unchanged. There was no evidence of new intra-abdominal or pelvic mass or free fluid. There were multiple anterior abdominal wall hernias which had increased in size.   · 7/27/15 - Comprehensive metabolic panel with no significant abnormalities. CA-125 of 21 (0-35).   · 10/26/15 - WBC 6, hemoglobin 13, platelet count 204,000. Heart rate 47. CA-125 of 20 (0-35).    · 2/18/16 - CT chest with contrast with stable micronodular density seen in the lungs without significant change from prior exam. CT abdomen and pelvis with regression of liver lesions with no new evidence of metastasis. Multiple ventral hernias again noted without significant change from prior exam. Creatinine 0.9 (0.6-1.3).    · 2/25/16 - Patient hospitalized at [facility] between 2/25/16 and 2/27/16 with pyelonephritis secondary to E-coli. She was given two days of IV Rocephin and then placed on oral Levaquin. She was asked to hold her Coumadin, but then had nausea and vomiting because of Levaquin and stopped the Levaquin also. Her CA-125 was 32 (0-35) and CEA 1.3 (0-5). Her urine grew >100,000 E-coli. CT of the abdomen and pelvis was done which revealed 4.1 x 1.8 cm sized mild ovoid area of decreased density in the liver parenchyma along the anteromedial  aspect of the dome of the right lobe of the liver, unchanged significantly since the previous study of 2/18/16. There was suspicion of a very small pericardial effusion. There was suspicion of a small hiatal hernia. There were several hernias in the anterior abdominal wall. A 3.5 x 3.1 cm incised well-circumscribed abnormal density in the left retroperitoneal fatty soft tissue at the level of the left iliac crest was suggestive of tumor recurrence. There was an abnormal density in the left retroperitoneal soft tissues in the left flank that partially surrounded the left kidney and extended downward to the left pelvic area. Diverticulosis of the distal descending colon and sigmoid colon were seen.     · 3/8/16 - Patient prescribed Bactrim DS 1 p.o. b.i.d. for 10 days for pyelonephritis, which was partially treated with Rocephin and Levaquin. Urine with 40,000 E-coli treated with Macrobid for 7 days.  of 20 (0-35).    · 3/31/16 - PET scan with area of low density anteriorly in the right lobe of the liver with no significant radiopharmaceutical uptake to suggest malignancy. Multiple round soft tissue density masses showing increased radiopharmaceutical activity and multiple anterior abdominal wall hernias.   · 5/10/16 - Patient complains of venous enlargement of the left chest wall around the port. Has not been seen by  yet, but has an appointment on 5/23/16. Complained of a cough.   · 5/12/16 - Infusaport contrast study with no evidence of fibrin sheath. Chest x-ray with mild cardiac prominence.   · 5/23/16 - Patient seen in consultation by Sheng Bowman M.D. at [facility] and a chemotherapy trial recommended.   · 6/1/16 -   of 27.1 (0-35).    · 6/14/16 - WBC 5.71, hemoglobin 12.4, platelet count 188,000. Patient is scheduled for surgery at  on 6/16/16 after further discussion with  physicians. Discussed adjuvant chemotherapy.   · 6/16/16 - Excisional biopsy of abdominal wall mass  performed by Sheng Bowman M.D. at [facility] with pathology revealing metastatic high-grade papillary serous carcinoma.   · 7/7/16 - Received a call from the patient that Tramadol was not working and that she was given Norco #24 after her biopsy at  which did help her pain. Patient prescribed Norco 5/325 one p.o. q. 4-6 hours p.r.n. #60 with no refills. Echocardiogram with left ventricular inferior wall hypokinesis with ejection fraction of 50-55%.   · 7/8/16 - Patient seen in consultation by Sheng Bowman M.D. in consultation at  for metastatic high-grade papillary serous carcinoma diagnosed by excisional biopsy on 6/16/16 with carcinomatosis and patient not a surgical resection candidate. Genetic sequencing and susceptibility testing of tumor was ordered. Biopsy was done of anterior abdominal wall.   · 7/7/16 - Echocardiogram with left atrial enlargement. Mild mitral regurgitation. Left ventricular proximal inferior wall hypokinesis with ejection fraction of 50-55%.   · 7/11/16 - While waiting for genomics results, in order not to delay treatment further, order written for Taxotere 60 mg/M2 IV over one hour followed by Carboplatin AUC 5 over one hour, cycles to be repeated every three weeks.  Comprehensive metabolic panel normal.  26 (0-35).    · 725/16 - Pain contract signed for use of Norco.   · 8/5/16 - Patient stated that she experienced a red rash and was itchy post taking Norco. Norco discontinued and Tramadol refilled.   · 8/16/16 - Patient started cycle 1 chemotherapy. WBC 7.1, hemoglobin 11.2, MCV 88.7, platelet count 94,000. Patient complained of nausea for a week post chemo. Already getting Emend, Aloxi and Decadron. Added Omeprazole 40 mg daily orally.   · 8/17/16 - Urine culture with >100,000 E-coli.   · 8/25/16 - Cycle 2 chemotherapy given.   · 9/9/16 - Magnesium 1.3, replaced intravenously. Potassium 3.4 (3.6-5.1), increased oral potassium supplementation.    · 9/16/16 -  Cycle 3 chemotherapy given.   · 9/19/16 - Comprehensive metabolic panel with no significant abnormality.   · 9/20/16 - CT abdomen and pelvis with evidence of progressive metastatic disease in the abdomen and pelvis with interval enlargement of several soft tissue attenuations and subcutaneous nodules in the anterior abdominal wall. Interval enlargement of a left pelvic soft tissue attenuation mass. A lentiform area of low attenuation in the dome of the liver stable in size. Multiple anterior abdominal wall hernias containing fat and/or bowel. Marked pelvic floor laxity. CT chest negative for evidence of metastatic disease. Tiny pulmonary nodules in the right lung stable since 2013 consistent with a benign etiology.   · 9/23/16 - Macrobid 100 mg p.o. b.i.d. x7 days prescribed for UTI. Current chemotherapy discontinued after discussion and new chemotherapy orders written. Carboplatin AUC-5 IV day 1 and Doxil (Liposomal Doxorubicin) 30 mg/M2 IV day 1 to cycle q. 21 days.  of 18 (0-35). Magnesium 1.6, replaced intravenously. Comprehensive metabolic panel with potassium 3.4 (3.6-5.1).     · 10/13/16 - Patient started on cycle 1 of Carboplatin and Liposomal Doxorubicin followed by Neulasta.   · 11/3/16 - Cycle 2 Carbo and Doxil given.   · 11/17/16 - Magnesium 1.0, replaced intravenously. Oral potassium supplement increased.   · 11/29/16 - CT chest with small non-calcified right pulmonary nodules unchanged. Benign bone island in the midthoracic vertebral body along with benign bony hemangiomas in the middle thoracic vertebral bodies. CT abdomen and pelvis with mild progressive metastatic disease from CT of September 2016. Anterior abdominal wall mass superiorly mildly increased in size with new area noted as described measuring 3 x 1.7 cm. 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 Stable area of decreased uptake in the dome of the liver not hypermetabolic on recent PET  scan, likely representing cyst or focal fatty infiltration. Stable small hiatal hernia, fat-containing Bochdalek’s hernia and anterior abdominal wall hernias with stable pelvic floor relaxation.    · 12/1/16 - Cycle 3 chemotherapy given.   · 12/8/16 - Current chemotherapy discontinued and patient started on Gemcitabine 1000 mg/M2 IV days 1, 8, 15 q. 28 days.   · 12/22/16 - Patient seen in followup by Juliana Roy M.D. at the pain clinic, treated with Oxycodone and Lyrica. Transaminases normal. Patient started cycle 1 chemotherapy.   · 12/29/16 - Magnesium 1.1 (1.8-2.5), replaced intravenously.   · 1/5/17 - Cycle 1 day 15 chemotherapy held as patient leaving for vacation to [place]. Chemotherapy dose decreased by 20%. Patient complains of diarrhea for which Imodium prescribed.   · 1/11/17 - BRCA-1 and BRCA-2 negative.   · 1/12/17 - Echocardiogram with ejection fraction 60% or greater.   · 1/19/17 - Comprehensive metabolic panel with no significant abnormality. Patient started cycle 2 chemotherapy.   · 2/2/17 - Urine with >100,000 E-coli treated with Cipro 500 mg p.o. b.i.d. x1 week.   · 2/6/17 - Magnesium 1.1 (1.8-2.5), replaced intravenously.    · 2/23/17 - Patient started cycle 3 chemotherapy.   · 2/27/17 - CT chest with interval development of too numerous to count very small pulmonary nodules, ill-defined ground glass opacities concerning for development of pulmonary metastatic disease. Stable left-sided chest port. CT abdomen and pelvis with stable appearance of multiple small soft tissue densities within the abdomen near midline subcutaneous fat of the abdominal wall. Interval decrease in size of largely calcified left pelvic mass and multiple fat in bowel containing small ventral hernias.   · 3/6/17 - Pulmonary consultation obtained for lung nodules. Discussed CT results with patient. Decision made to continue present chemotherapy until further lung evaluation as abdominal disease better.  of 18  (0-35).    · 3/16/17 - Patient started cycle 4 chemotherapy, but treatment held from day 8 onwards because of hospitalization.   · 3/19/17 - Patient was hospitalized at [facility] between 3/19/17 and 3/25/17 with chest pain. Chest x-ray had revealed increased mild bibasilar airspace disease. Troponin I and EKG’s were negative. INR was elevated. Patient was treated with antibiotics. EGD was performed revealing small hiatal hernia and esophageal dilatation was performed. Bronchoscopy was performed also with no intrabronchial lesions identified. IgA was 51 (), IgG 320 (600-1500) and IgM 19 (). Lexiscan nuclear stress test had no evidence of reversible myocardial ischemia with normal left ventricular ejection fraction of 58%. CT chest had development of patchy bilateral ground glass opacities most pronounced involving the left upper lobe and bilateral lower lobes. Multiple tiny bilateral pulmonary nodules were less conspicuous. The patient underwent a bronchoscopy by Jerica Esparza M.D. with right upper lobe bronchoalveolar lavage revealing benign squamous cells and bronchial cells with pulmonary macrophages present. There was mild acute inflammation. Negative for malignant cells. Prilosec was changed to Famotidine for hypomagnesemia.    · 4/6/17 - Magnesium 1.2 (1.8-2.5). Comprehensive metabolic panel with no significant abnormality. Patient seen in follow-up by Fito Ram M.D. at [facility]. Treated with Lyrica and Oxycodone.    · 5/4/17 - Patient complains of a rash itching on her right upper arm. Eczematous rash noted and patient prescribed Cyclocort 0.1% b.i.d. Magnesium infusions continued with each chemo. Order written to resume chemotherapy.   · 5/16/17 - Cycle 5 chemotherapy started.   · 5/26/17 - Magnesium 1.4 (1.8-2.5), replaced intravenously. Potassium 2.9 (3.6-5.1), KCL increased to 20 mEq three in the morning and three in the evenings.   · 5/30/17 - PET scan with remaining metabolic  activity within the nodular areas. The suggested mass which is partially calcified and necrotic within the left aspect of the pelvis seems slightly larger with increased metabolic activity. There was more calcification in these areas compared to prior study, suggesting inflammation.      · 6/8/17 - Patient complaining of shortness of breath. Does take HCTZ at home. Chest x-ray ordered and chemotherapy continued along with weekly magnesium replacement. Chest x-ray with no acute cardiopulmonary abnormalities.   · 6/13/17 - Patient received cycle 6 of chemotherapy.   · 7/31/17 - WBC 5.0, hemoglobin 11.2, MCV 89.7, platelet count 217,000. Patient is to resume chemotherapy starting with cycle 7 on Wednesday of this week.  of 19 (<35). Potassium 3.3 (3.5-5.3).     · 8/2/17 - Patient began cycle 7 of chemotherapy.   · 8/30/17 - Patient started cycle 8 chemotherapy.   · 9/5/17 - Patient seen in followup at Pain Management by Fito Ram M.D. and continued on treatment with Oxycodone and Lyrica.   · 9/13/17 - Patient was hospitalized at [facility] for a day with dizziness secondary to dehydration, hypokalemia and anemia. She was given 1 unit of packed red blood cells and electrolytes were replaced. Chest x-ray revealed a subtle opacity in the left lateral lung base favored to represent atelectasis. Magnesium 1.3 (1.5-2.5), patient continued on replacement.    · 9/22/17 - Chemotherapy and magnesium replacement continued with decrease in chemotherapy dose by 10% secondary to cytopenias.   · 9/25/17 - Cycle 9 chemotherapy started.   · 10/12/17 - CT of the chest with contrast showed several tiny pulmonary nodules. One within the right lower lobe appears new from prior exams. Two adjacent foci of nodularity within the medial right lower lobe suggestive of minor infectious or inflammatory process. CT of the abdomen and pelvis with contrast which showed soft tissue nodules along the anterior abdominal and pelvic walls  unchanged from previous scan. Also a partially calcified mass within the left pelvis unchanged. No acute process identified within the abdomen or pelvis. Several left paracentral ventral hernias unchanged. No evidence of acute bowel obstruction.   · 10/16/17 - Order written for Levaquin 500 mg p.o. daily as the patient states that she has had a productive cough, fever and chills and with the results of the CT scan showing a possible infectious versus inflammatory process. Order also written to continue chemotherapy and magnesium replacement as previously prescribed.   · 10/23/17 - Cycle 10 chemotherapy started.   · 11/19/17 - Patient went to the Emergency Room at [facility] and [facility] extremity redness and fever for a day. Venous Doppler had no evidence of a DVT and patient was discharged home on Clindamycin.   · 11/21/17 -  of 17 (0-35).   · 12/4/17 - Cycle 11 chemotherapy started.   · 12/20/17 - PET scan with multiple hypermetabolic soft tissue nodules abutting the anterior abdominal wall, stable from previous examination. Peripherally calcified left lower quadrant mass near the ascending colon stable in size demonstrating no hypermetabolic uptake. Previously had maximum SUV of 7. Right medial basilar pulmonary nodules resolved.   · 12/22/17 - CT left lower extremity with soft tissue swelling with skin thickening present along the anterior and medial aspect of the leg, slightly more pronounced around the palpable marker seen within the upper medial aspect of the lower extremity.   · 12/26/17 - Elastic stockings prescribed for lower extremity edema.   · 1/8/18 - Patient hospitalized at [facility] between 1/8/18 and 1/11/18 with fever of up to 101 degrees and painful rash on the lower extremities of several weeks’ duration. She was found to be hypokalemic and MRI of the lower extremities revealed thickening and swelling. Chest x-ray had no acute cardiopulmonary abnormality. Skin biopsy was performed by  Surgery revealing stasis dermatitis and no evidence of malignancy. Infectious Disease consultation was obtained and IV antibiotics were stopped. Blood cultures had no growth.   · 1/22/18 - Patient asked to start wearing elastic stockings which she has not started yet. She was given a prescription for Dyazide 1 p.o. daily.   · 2/26/18 - Ordered chemo to resume again. Patient unaware that she was supposed to resume chemo after her last visit in January. Continue magnesium infusions on day 1, 8 and 15. Prescribed Levaquin 500 mg p.o. daily x10 days for productive cough x1 week. History of viral illness consistent with influenza.  of 18 (0-35). Chest x-ray with no acute process.    · 3/5/18 - Cycle 12 chemotherapy started.   · 3/26/18 - Options discussed with patient. Decision made to discontinue IV Gemzar and orders written to start on Niraparib (Zejula) 300 mg p.o. daily as maintenance therapy.   · 4/11/18 - Niraparib discontinued due to insurance denial. Orders written to start Carboplatin-AUC 5 IV day 1 cycling every 21 days. Orders written for Neulasta 6 mg subcutaneous kit day 1 with palliative treatment intent and expected duration of treatment three months.   · 4/12/18 - CT chest, abdomen and pelvis with contrast revealed numerous tiny centrilobular nodules in the lungs favored to reflect infectious or inflammatory process. Nodules ranged 1-3 mm in size and are new from prior studies with metastatic disease not entirely excluded. Stable small hiatal hernia. Interval progression of metastatic disease involving the subcutaneous tissues at the ventral abdominal wall. Multiple soft tissue density nodules previously shown to be hypermetabolic on PET scan. New small crescent of low attenuation material along the periphery of the spleen with similar finding at the dome of the liver. Findings are concerning for peritoneal metastases. Density of the dome of the liver remained unchanged from multiple prior studies.  Stable calcified mass in the left pelvic sidewall. Urinary bladder wall thickening.   · 4/23/18 - Cycle 1 chemotherapy with Carboplatin administered along with Neulasta injection.   · 4/26/18 - Neulasta discontinued due to insurance denial.   · 5/14/18 - Patient received cycle 2 Carboplatin.   · 6/5/18 - Cycle 3 day 1 chemotherapy with Carboplatin administered.   · 6/20/18 - CT chest, abdomen and pelvis at [facility] showed re-demonstration of soft tissue mass in the ventral wall hernia left of the midline as well as the dome of the liver, likely representing metastatic disease similar as compared to the prior study. Additional calcified lesions present in the upper left hemipelvis and along the anterior abdominal wall to the right of the midline also likely representing metastatic disease. No definite new lesions were identified. Moderate to large stool burden likely related to mild constipation was present. No suspicious lung nodules were identified. The previously-described ground glass nodules appeared to have resolved. There was a stable subpleural nodule in the right upper lobe measuring 3 mm present since 2014 without significant changes.   · 6/26/18 - Cycle 4 day 1 Carboplatin administered with addition of Neulasta for febrile neutropenia prophylaxis.   · 6/29/18 - Reviewed CT scans with patient. Orders written to discontinue Carboplatin and Neulasta and plan to start Niraparib 300 mg by mouth daily as maintenance therapy. Prescribed Amlodipine 2.5 mg p.o. daily for chemo-induced hypertension.   · 7/18/18 - Orders written to increase weekly Magnesium Sulfate to 3 g IV weekly due to continued hypomagnesemia.   · 8/1/18 - Patient reports she has not received Niraparib (Zejula) to date.   · 8/30/18 - Patient’s phone was not working so though Niraparib approved, the drug company had not been able to get ahold of her. Patient supplied with drug company number so that she can start taking the pills.    · 9/6/18 -  of 20 (N).   · 9/18/18 - Urinalysis done for dysuria and back pain. Urine culture grew 20,000 to 50,000 colonies of urogenital ruy. Prescribed Bactrim DS one tablet twice daily for one week.   · September 2018 - Patient initiated on Zejula 300 mg p.o. daily.   · 10/1/18 - WBC 3.5, hemoglobin 12, platelet count 74,000. Niraparib (Zejula) dose held and then resumed when platelets above 100,000 at a dose of 200 mg daily.   · 10/15/18 - Patient received Niraparib (Zejula) at 200 mg p.o. daily with a platelet count of 198,000.   · 11/7/18 - WBC 6, hemoglobin 11.6, MCV 96.2, platelet count 137,000. Patient claims to have stopped taking Niraparib a few days prior because of cough. Asked to resume taking it. Ciprofloxacin 500 mg p.o. twice daily for one week for bronchitis.   · 12/3/18 - Magnesium Sulfate infusions changed to every two weeks, to send mag level before and give 3 grams. DC’d Magnesium Oxide and started Magnesium Plus Protein two pills twice daily.   · 1/4/19 - CT chest, abdomen and pelvis with new patchy nodular areas of airspace consolidation within the bilateral upper lobes, left greater than right, favored to be infectious or inflammatory. Interval enlargement of the peritoneal soft tissue masses within the pelvis compatible with progression of disease. Enlarging ventral hernia now containing multiple loops of small bowel and colon.   · 1/7/19 - Patient has not been coming in for weekly magnesium replacement as nursing has not been able to get ahold of her. Results of CT’s discussed with patient. Decision made to change her to IV chemotherapy with Carboplatin AUC-5 day 1 and pegylated liposomal Doxorubicin (Doxil) 30 mg/M2 IV day 1 to cycle every four weeks. Patient made aware of the limited choices of her treatment available.   · 1/31/19 - Echocardiogram showed LVEF 50-55% and otherwise normal echo Doppler study.   · 2/4/19 - New chemotherapy not initiated to date with difficulty  contacting patient for echocardiogram. Neulasta On-Pro 6 mg ordered with chemotherapy due to extensive prior exposure to chemotherapy, increasing risk of febrile neutropenia, history of neutropenic events on prior chemotherapy regimens.   · 2/15/19 - Patient started cycle 1 chemotherapy with Neulasta support.   · 3/6/19 -  of 28 (0-35).   · 3/15/19 - Cycle 2 Carboplatin with Liposomal Doxorubicin (Doxil) and Neulasta support initiated.     · 4/10/19 - Patient was requested to followup with Dr. Queen regarding hypotension and blood pressure medication dosing. Patient appears to be tolerating treatment well with cytopenias not requiring dose adjustments. No Doxil-related skin or mucus membrane toxicities or other significant toxicities to date.   · 4/12/19 - Cycle 3 chemotherapy given.   · 4/16/19 - CT chest, abdomen and pelvis with no evidence of active metastasis to the chest. Several tree-in-bud nodules within the periphery of the inferior right upper lobe likely infectious or inflammatory. Disease burden within abdomen and pelvis stable to slightly increased from prior CT. Specifically, a soft tissue mass along the right rectus muscle slightly increased in bulk. Multiple ventral hernias, some containing loops of bowel, with no evidence of acute bowel obstruction.   · 5/8/19 - Discussed CT results with patient. Overall stable disease and would like to continue same treatment. Dove soap and Hydrocortisone Cream p.r.n. prescribed for scattered rash on back.   · 5/10/19 - Cycle 4 Carboplatin and Liposomal Doxorubicin initiated.   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 It was ER positive, HER2/zuleima negative, PD-L1 negative. There was TOPO1 positivity and TUBB3 positivity. TMB was low (two mutations per megabyte MUTS/MB) and MSI was stable. There were 13 therapies considered with increased benefit. The 13 therapies with increased benefit  included Fulvestrant, Irinotecan, Letrozole, Topotecan, Anastrazole, Exemestane, Tamoxifen, all of which were referenced to [facility] as well as Abemaciclib, Everolimus, Gefitinib, Palbociclib, Ribociclib and Toremifene.     · 6/5/19 echocardiogram with preserved LV systolic function with EF around 60%.  · 6/7/19 cycle 5 chemotherapy with Neulasta support given.  Patient continued on mag oxide 400 mg p.o. twice daily and weekly IV 3 g mag sulfate infusions for persistent hypomagnesemia.  · 7/5/2019- cycle 6 chemotherapy with carboplatin and doxorubicin with Neulasta port initiated.  Ca125:  21 (0-35).  · 7/23/2019-CT chest abdomen and pelvis compared to CT from 4/16/2019 revealed multiple small pulmonary nodules unchanged from prior examination within the right upper and left lower lobes.  Complex ventral hernia similar to prior exam.  Soft tissue nodule along the right lateral margin of the right of the midline measuring 12 x 10 mm unchanged in size.  Irregular soft tissue nodular thickening along the margin of the most inferior aspect of the complex ventral hernia with thickness measuring up to 13 mm similar to prior examination.  Extensive calcified and soft tissue mass within the left lower quadrant decrease in size now measuring 2.6 x 2.3 cm previously 3.0 x 2.5 cm.  Partially calcified mass involving the right rectus sheath measuring 3.1 x 2.7 cm previously measured 3.3 x 2.9 cm.  Densely calcified mass measuring 2.1 x 1.6 cm unchanged previously measured 2 x 1.7 cm.  Right external iliac chain lymph node measuring 9 mm previously measured 5 mm.  · 8/2/2019 cycle 7 chemotherapy given.  · 8/30/2019-cycle 8 chemotherapy with carboplatin and doxorubicin with Neulasta support given.  · 9/27/2019 cycle 9 chemotherapy given.  · 10/1/19 - Echocardiogram showed Normal LV size and contractility EF of 60-65%. Normal RV size. Borderline left atrial enlargement. Aortic valve, mitral valve, tricuspid valve appears structurally  normal, no significant regurgitation seen.No pericardial effusion seen. Proximal aorta appears normal in size.  · 10/18/19 - Magnesium 1.5 (1.8-2.5).  IV magnesium replacement continued.  · 10/25/2019 cycle 10 chemotherapy given.  · 10/29/2019 patient had CT scan of the chest abdomen and pelvis-there is a new 2 mm nodule in the left lower lobe with a stable 2 mm nodule in the left lower lobe.  Follow-up in 6 months was recommended.  Stable multiple soft tissue density and calcified nodules within the ventral abdominal wall and left retroperitoneal fat consistent with nonviable metastatic disease.  These nodules have either decreased in size or stable.  · 11/22/2019 10 received cycle 11 of chemotherapy with Doxil and carboplatinum with Neulasta  · 12/8/2019 to 12/11/2019 patient was admitted to the hospital for neutropenic fever.  · 12/20/2019 patient received cycle 12 of chemotherapy with Doxil and carboplatinum with Neulasta  · 1/13/20: 2 D echo was normal with EF 56% to 60%  · 1/24/20-Patient received cycle 13 of combination chemotherapy with carboplatinum and Doxil  · 2/3/2020 patient had a  which was 25.4  · 2/21/2020-patient received cycle 14 of chemotherapy with carboplatinum and Doxil  · 3/6/2020 patient had CT scan of the chest, abdomen and pelvis this revealed a 3 mm nodule in the left lower lobe present on prior CT of the abdomen unchanged from July 2019.  Stable 3 mm right upper lobe nodule.  There is a lymph node in the AP window measuring 8 x 9 mm prominent left hilar lymph node measuring 12 mm previously was 7 x 7 mm no significant adenopathy was seen in the abdomen there is an area of irregular soft tissue in the left paramidline ventral hernia which is stable measuring 5.7 cm partially calcified mass in the right rectus measuring 3 x 2.5 cm which is also stable the prominent lymph nodes in the left mid hilum and mediastinum may be reactive or metastatic disease  · 4/17/2020-patient had cycle 15  of single agent carboplatinum  · 5/26/2020 patient had CT scan of the chest, abdomen and pelvis showed 3 new lung nodules measuring 7 mm in the left upper lobe, 5 mm in the left lower lobe and 4 mm in the right lower lobe.  There were additional small lung nodules which were unchanged.  Mildly prominent mediastinal and bilateral hilar lymph nodes mildly increased in size.  Right hilar node 9 mm previously was 5 mm.  Carinal node 9 mm previously was 6 mm.  The nodule at the right side of the hernia sac has increased to 1.5 cm from 1.2 cm.  Also a nodule in the subcutaneous fat currently measures 2.4 was previously 2 cm.  · 5/15/2020-patient received cycle 16 of carboplatinum  · Since the last visit in the office patient patient developed dizzy spell and fell. For that reason she was taken to the emergency room at [place].    · 7/3/2020 she had brain MRI which showed an 8 mm focus of abnormality in the left aspect of the posterior fossa corresponding to a dural based enhancing lesion thought to represent a calcified meningioma vessels calcified dural based metastasis.  This lesion has a slight local mass-effect and a small amount of surrounding edema.  There is also a 4 to 5 mm rounded focus of abnormal nodular enhancement along the cortex of the left parietal lobe but no significant mass-effect.  This is nonspecific could represent neoplastic process, infectious/inflammatory process or granulomatous disease.  · Patient was seen by neurosurgery, follow-up brain MRI in 8 weeks has been recommended by Dr. Hartman  · 7/9/2020 patient was initiated on single agent topotecan cycle 1 day 1  · 7/16/2020 she received the 8 topotecan  · 8/6/2020 patient received cycle 1 day 15 of topotecan  · 9/2/2020 patient had brain MRI multiple hospital which showed interval increase in size of the metastasis in the left parietal lobe now measuring 9 x 7 mm.  Previously was 5 mm.  There has been interval increase in size of the left superior  cerebellar metastases now measuring 1.3 cm previously was 8 mm.  There was no new metastatic disease identified.  · 9/11/2020 patient received cycle 2-day 15 of single agent topotecan  · Was admitted to the hospital 10/1/2020 for supratherapeutic INR  · 10/13/2020 patient was seen by Dr. Delong she has started stereotactic brain radiation to be completed on 10/28/2020  · 11/6/2020 patient received cycle 3-day 1 of chemotherapy with topotecan  · 11/13/2020 patient received cycle 3-day 8 of topotecan       2. Deep vein thrombosis of left upper extremity diagnosis established in October of 2013.  · 10/16/13 - Venous Doppler of left upper extremity.  Impression:  Deep vein thrombosis involving the subclavian, axillary and brachial veins on the left side, superficial vein thrombosis involving the left basilic vein.  · 10/16/13 - Order written for Lovenox 120 mg subcutaneously daily until INR is in therapeutic range.  Order written for Coumadin 5 mg p.o. daily.  The patient is to follow PT and INR protocol.  · 5/20/16 - Venous Doppler bilateral lower extremities normal.  · 7/30/19 - Patient continued on Coumadin following the INR protocol.      3. Anemia diagnosis established in November 2013 and pernicious anemia diagnosis established March 2016.   · 11/15/13 - Hemoglobin is 7.8.  · 12/2/13 - Orders written to start Aranesp 200 mg subcutaneous every two weeks due to low hemoglobin secondary to chemo induced anemia.  Hemoglobin is 9.7.  Anemia workup is ordered.  · 12/03/13 - Ferritin 179.8 (N), folic acid 8.3 (N), vitamin B12 314, iron 104 (N), TIBC 298 (N), iron saturation 35 (N), Reticulocyte count 0.88 (N).  EPO level 124 (N).  Haptoglobin 22 (H).  · 10/22/14 - Hemoglobin 12.5, hematocrit 37.3, MCV 91.6.  · 10/23/14 - Anemia resolved.   · 3/8/16 - WBC 5.8, hemoglobin 11.7, MCV 90.3, platelet count 245,000. Vitamin B12 of 189 (180-914), ferritin 61 (), iron 91 (), TIBC 345 (228-428).   · 3/15/16 -   ().        · 3/22/16 - Intrinsic factor antibody positive, antiparietal antibody negative. Vitamin B12 at 1000 mcg IM weekly started, but the patient after receiving first dose on 3/22/16 did not come back for injections until 4/13/16.   · 4/13/16 - WBC 5.1, hemoglobin 12.5, MCV 87.9, platelet count 201,000. Patient given B12 injection and then continued at home.   · 5/10/16 - WBC 5.1, hemoglobin 12.5, platelet count 201,000.   · 6/14/16 - Monthly Vitamin B12 injections continued at home.   · 7/11/16 - WBC 5.48, hemoglobin 12.7, MCV 86.2, platelet count 200,000.   · 8/16/16 - Patient continued on monthly Vitamin B12 injections at home.   · 1/5/17 - WBC 2.6 with 38% neutrophils, 56% lymphocytes, 5% monocytes, hemoglobin 10.5, .3, platelet count 63,000. Patient continued on Vitamin B12 injections 1000 mcg IM monthly at home.   · 3/20/17 - Retic 0.41 (0.5-1.5), creatinine 1.0 (0.4-1.0). Iron 12 (), TIBC 270 (228-428), ferritin 259 (), haptoglobin 340 (), TSH 0.43 (0.34-5.6), folate 6.0 (5.9-24.8). Serum protein electrophoresis revealed decreased gammaglobulins. Serum EDU had no monoclonal gammopathy identified. Stool Hemoccult was negative.   · 6/8/17 - WBC 6.3, hemoglobin 8.3, MCV 92.4, platelet count 50,000. Procrit 40,000 units subq weekly added for chemotherapy-induced anemia. Patient continued on Vitamin B12 at 1000 mcg IM monthly at home.   · 7/5/17 - Iron 33 (), TIBC 328 (228-428), ferritin 211 (), TSH 2.46 (0.34-5.6).       · 7/31/17 - WBC 5.0, hemoglobin 11.2, MCV 89.7, platelet count 217,000. We will continue with the Procrit 40,000 units subq weekly for chemo-induced anemia when the hemoglobin falls below 10.0 and the patient is also to continue with the Vitamin B12 at 1000 mcg IM monthly at home. Creatinine 1.24 (0.5-0.99).    · 8/28/17 - WBC 9.6, hemoglobin 8.7, MCV 93.7, platelet count 133,000. Patient is to continue with the Procrit 40,000 units subq weekly and  No will receive that today. She is also to continue with the Vitamin B12 at 1000 mcg IM monthly at home.  · 10/16/17 - WBC 7.5, hemoglobin 9.6, MCV 98.4, platelet count 195,000. Patient is to continue with Procrit 40,000 units subq weekly and will receive Procrit today.   · 1/1/18 - Creatinine 1.3 (0.4-1.0).    · 2/19/18 - Procrit given for hemoglobin 9.9.   · 2/26/18 - Continue Procrit 40,000 units weekly p.r.n. hemoglobin <10. Continue Vitamin B12 at 1000 mcg IM monthly at home.   · 3/26/18 - Hemoglobin 8.3. Procrit dose increased to 60,000 units weekly. Iron 29 (), TIBC 306 (228-428), and iron sat 9% (15-50). Iron 29 (), TIBC 306 (228-428), iron saturation 9% (15-50).   · 3/27/18 - Order written to start Ferrous sulfate 325 mg p.o. b.i.d.    · 4/2/18 - Stool heme negative x3.   · 4/30/18 - Patient had not started Ferrous sulfate 325 mg p.o. b.i.d. and verbalized understanding to do so and to notify the office if side effects are not tolerable.   · 5/24/18 - Patient was hospitalized at [facility] between 5/24/18 and 5/26/18 with dark tarry stool. INR was subtherapeutic. She was given IV Protonix and Protonix 40 mg daily. She underwent an EGD by David Rogers M.D. revealing small hiatal hernia, small submucosal nodule near the cardia, erosive gastritis. Pathology on gastric antrum and body biopsy revealed iron pill gastritis and no evidence of Helicobacter pylori. She then underwent a colonoscopy revealing diverticulosis, hemorrhoids and surgical changes from previous resection. It was recommended to consider outpatient M2A if hemoglobin continued to drop.   · 6/4/18 - Order written to discontinue iron pills and to use Injectafer 750 mg IV days 1 and 8 for low iron sats. Order written for Procrit 40,000 units subq weekly. Iron 65 (), TIBC 328 (228-428), iron saturation 20% (15-50), ferritin 123 ().   · 6/29/18 - Discussed patient’s intolerance of oral iron due to gastritis. Oral iron  discontinued with plans for Injectafer should iron level drop in the future.   · 11/7/18 - Patient claims not to be taking Vitamin B12 injections at home. Asked to resume those.   · 12/3/18 - Patient reports she has not been taking her B12 injections for a couple of months. She needs some syringes and needles for that.   · 2/4/19 - Patient is uncertain if she is currently taking Ferrous Sulfate or not. Patient with history of intolerance and will follow hemoglobin.   · 5/8/19 - Ferritin 64 ().  · 8/27/2019- iron 52 (), iron saturation 14% (15-50), TIBC 364 (228-420), and ferritin 74 ().  Injectafer 750 mg IV day 1 and 8 due to oral iron intolerance ordered.  · 8/30/2019- Injectafer 750 mg IV day 1 given.  Hemoglobin 10.8.  At the end of the infusion heart rate was 48 and the patient reported mild dizziness.  Patient was sent to the ED for evaluation with symptoms resolving rapidly.  Chest x-ray and CT head were negative for acute findings.  · 9/6/2019-Injectafer 750 mg IV day 8 given without adverse effects.  Hemoglobin 9.8.   · 9/25/19 - Iron 85 (). Iron saturation 25 (15-50). TIBC 335 (228-428). Ferritin  694 ().  Hemoglobin 10.3.  · 10/25/2019 hemoglobin 9.7.  Patient started on Retacrit 40,000 units subcu weekly.    Past Medical History:   Diagnosis Date   • Anemia 2013   • Cervical disc disorder 2013    Herniated disc, pinched nerve   • Clotting disorder (CMS/HCC) 2013    Low platelets from chemo   • Colon polyp 2013   • Deep vein thrombosis (CMS/HCC) 2013   • Diverticulosis 2013   • GERD (gastroesophageal reflux disease) 2016   • HL (hearing loss) 2016    I need hearing aids   • Hyperlipidemia 2013    Need my cholesterol rechecked   • Hypertension    • Joint pain 2013    Shoulder pain, torn rotator cuff   • Low back pain 2013   • Neuromuscular disorder (CMS/HCC) 2015    Shingles, pinched nerves in my neck   • Osteopenia 2014   • Osteoporosis    • Ovarian cancer (CMS/HCC)      ovarian   • Ovarian cancer on left (CMS/HCC) 1995   • Pneumonia 2010    Have had it several times   • Spinal stenosis 2013    Cervical   • Urinary tract infection 2013    Have had several utis       Past Surgical History:   Procedure Laterality Date   • COLON SURGERY     • COLONOSCOPY  05/26/2018   • EXPLORATORY LAPAROTOMY     • HERNIA REPAIR     • HYSTERECTOMY     • OOPHORECTOMY           Current Outpatient Medications:   •  acetaminophen (TYLENOL) 500 MG tablet, Take 1,000 mg by mouth Every 6 (Six) Hours As Needed for Mild Pain ., Disp: , Rfl:   •  atorvastatin (LIPITOR) 20 MG tablet, Take 20 mg by mouth every night at bedtime., Disp: , Rfl: 3  •  cyanocobalamin 1000 MCG/ML injection, Inject 1,000 mcg into the appropriate muscle as directed by prescriber Every 28 (Twenty-Eight) Days., Disp: , Rfl:   •  famotidine (PEPCID) 40 MG tablet, TAKE 1 TABLET BY MOUTH EVERY MORNING BEFORE BREAKFAST, Disp: 90 tablet, Rfl: 1  •  loperamide (Imodium A-D) 2 MG tablet, Take 1 tablet by mouth 3 (Three) Times a Day As Needed for Diarrhea., Disp: 60 tablet, Rfl: 2  •  magnesium oxide (MAG-OX) 400 MG tablet, TAKE 1 TABLET BY MOUTH TWICE A DAY, Disp: 180 tablet, Rfl: 1  •  ondansetron ODT (ZOFRAN-ODT) 8 MG disintegrating tablet, Place 1 tablet under the tongue Every 8 (Eight) Hours As Needed for Nausea or Vomiting., Disp: 30 tablet, Rfl: 3  •  oxyCODONE (ROXICODONE) 10 MG tablet, Take 1 tablet by mouth 3 (Three) Times a Day As Needed for Moderate Pain ., Disp: 90 tablet, Rfl: 0  •  potassium chloride (K-DUR,KLOR-CON) 20 MEQ CR tablet, Take 40 mEq by mouth Every Evening., Disp: , Rfl:   •  pregabalin (LYRICA) 100 MG capsule, Take 1 capsule by mouth 3 (Three) Times a Day., Disp: 90 capsule, Rfl: 2  •  sertraline (ZOLOFT) 50 MG tablet, Take 50 mg by mouth Every Evening., Disp: , Rfl:   •  temazepam (RESTORIL) 30 MG capsule, TAKE 1 CAPSULE BY MOUTH AT NIGHT AS NEEDED FOR SLEEP., Disp: 30 capsule, Rfl: 1  •   triamterene-hydrochlorothiazide (DYAZIDE) 37.5-25 MG per capsule, TAKE 1 CAPSULE BY MOUTH EVERY DAY, Disp: 90 capsule, Rfl: 1  •  warfarin (COUMADIN) 5 MG tablet, Take 1 tablet by mouth Daily. At 1700 as directed, Disp: 90 tablet, Rfl: 0  •  traZODone (DESYREL) 50 MG tablet, Take 1 tablet by mouth Every Night., Disp: 30 tablet, Rfl: 1    Allergies   Allergen Reactions   • Morphine Nausea Only and Hives   • Penicillin V Potassium Rash   • Sulfa Antibiotics Rash   • Levaquin [Levofloxacin] Nausea Only and Dizziness     When taken with Coumadin   • Amoxicillin Rash   • Norco [Hydrocodone-Acetaminophen] Rash       Family History   Problem Relation Age of Onset   • Hypertension Mother    • Cancer Father    • Hypertension Father    • Hypertension Sister    • Hypertension Brother    • Stroke Brother        Cancer-related family history includes Cancer in her father.    Social History     Tobacco Use   • Smoking status: Never Smoker   • Smokeless tobacco: Never Used   Substance Use Topics   • Alcohol use: No     Frequency: Never     Comment: caffeine 32-64oz qd   • Drug use: No       I have reviewed and confirmed the accuracy of the patient's history: Chief complaint, HPI and ROS as entered by the MA/LPN/RN. Cindy Ball MD 11/19/20     SUBJECTIVE:    The patient is here for a follow up appointment.  Patient is scheduled for cataract surgery on Monday, November 23, 2020.  Warfarin has been on hold since yesterday.  Her surgery will be done at [facility] with plans to obtain INR by her surgeon prior to surgery        REVIEW OF SYSTEMS:  Review of Systems   Constitutional: Negative for chills and fever.   HENT: Negative for ear pain, mouth sores, nosebleeds and sore throat.    Eyes: Negative for photophobia and visual disturbance.   Respiratory: Negative for wheezing and stridor.    Cardiovascular: Negative for chest pain and palpitations.   Gastrointestinal: Negative for abdominal pain, diarrhea, nausea and vomiting.  "  Endocrine: Negative for cold intolerance and heat intolerance.   Genitourinary: Negative for dysuria and hematuria.   Musculoskeletal: Negative for joint swelling and neck stiffness.   Skin: Negative for color change.   Neurological: Negative for seizures and syncope.   Hematological: Negative for adenopathy.   Psychiatric/Behavioral: Negative for agitation, confusion and hallucinations.     I have reviewed and confirmed the accuracy of the ROS as documented by the MA/LPN/RN Cindy Ball MD      OBJECTIVE:    Vitals:    11/19/20 1348   BP: 117/76   Pulse: 88   Resp: 18   Temp: 97.7 °F (36.5 °C)   TempSrc: Temporal   Weight: 81.2 kg (179 lb)   Height: 165.1 cm (65\")   PainSc: 0-No pain       ECOG  (1) Restricted in physically strenuous activity, ambulatory and able to do work of light nature    Physical Exam   Constitutional: She is oriented to person, place, and time. No distress.   HENT:   Head: Normocephalic and atraumatic.   Eyes: Conjunctivae are normal. Right eye exhibits no discharge. Left eye exhibits no discharge. No scleral icterus.   Neck: Normal range of motion. Neck supple. No thyromegaly present.   Cardiovascular: Normal rate, regular rhythm and normal heart sounds. Exam reveals no gallop and no friction rub.   Pulmonary/Chest: Effort normal. No stridor. No respiratory distress. She has no wheezes.   Abdominal: Soft. Bowel sounds are normal. She exhibits no mass. There is no abdominal tenderness. There is no rebound and no guarding.   Musculoskeletal: Normal range of motion. No tenderness.   Lymphadenopathy:     She has no cervical adenopathy.   Neurological: She is alert and oriented to person, place, and time. She exhibits normal muscle tone.   Skin: Skin is warm. She is not diaphoretic. No erythema.   Psychiatric: Her behavior is normal. Judgment and thought content normal.   Nursing note and vitals reviewed.    I have reexamined the patient and the results are consistent with the " previously documented exam. Cindyalexandrea Ball MD     RECENT LABS  WBC   Date Value Ref Range Status   11/16/2020 3.94 3.40 - 10.80 10*3/mm3 Final   07/05/2020 4.04 (L) 4.5 - 11.0 10*3/uL Final     RBC   Date Value Ref Range Status   11/16/2020 3.66 (L) 3.77 - 5.28 10*6/mm3 Final   07/05/2020 3.68 (L) 4.0 - 5.2 10*6/uL Final     Hemoglobin   Date Value Ref Range Status   11/16/2020 10.7 (L) 12.0 - 15.9 g/dL Final   07/05/2020 11.5 (L) 12.0 - 16.0 g/dL Final     Hematocrit   Date Value Ref Range Status   11/16/2020 34.5 34.0 - 46.6 % Final   07/05/2020 35.3 (L) 36.0 - 46.0 % Final     MCV   Date Value Ref Range Status   11/16/2020 94.3 79.0 - 97.0 fL Final   07/05/2020 95.9 80.0 - 100.0 fL Final     MCH   Date Value Ref Range Status   11/16/2020 29.2 26.6 - 33.0 pg Final   07/05/2020 31.3 26.0 - 34.0 pg Final     MCHC   Date Value Ref Range Status   11/16/2020 31.0 (L) 31.5 - 35.7 g/dL Final   07/05/2020 32.6 31.0 - 37.0 g/dL Final     RDW   Date Value Ref Range Status   11/16/2020 15.6 (H) 12.3 - 15.4 % Final   07/05/2020 15.9 12.0 - 16.8 % Final     RDW-SD   Date Value Ref Range Status   11/16/2020 51.1 37.0 - 54.0 fl Final     MPV   Date Value Ref Range Status   11/16/2020 10.5 6.0 - 12.0 fL Final   07/05/2020 10.2 6.7 - 10.8 fL Final     Platelets   Date Value Ref Range Status   11/16/2020 99 (L) 140 - 450 10*3/mm3 Final   07/05/2020 158 140 - 440 10*3/uL Final     Neutrophil Rel %   Date Value Ref Range Status   07/05/2020 54.0 45 - 80 % Final     Neutrophil %   Date Value Ref Range Status   11/16/2020 63.1 42.7 - 76.0 % Final     Lymphocyte Rel %   Date Value Ref Range Status   07/05/2020 30.4 15 - 50 % Final     Lymphocyte %   Date Value Ref Range Status   11/16/2020 32.5 19.6 - 45.3 % Final     Monocyte Rel %   Date Value Ref Range Status   07/05/2020 12.4 0 - 15 % Final     Monocyte %   Date Value Ref Range Status   11/16/2020 2.3 (L) 5.0 - 12.0 % Final     Eosinophil %   Date Value Ref Range Status    11/16/2020 1.8 0.3 - 6.2 % Final   07/05/2020 3.0 0 - 7 % Final     Basophil Rel %   Date Value Ref Range Status   07/05/2020 0.2 0 - 2 % Final     Basophil %   Date Value Ref Range Status   11/16/2020 0.3 0.0 - 1.5 % Final     Immature Grans %   Date Value Ref Range Status   07/05/2020 0.0 0 % Final     Neutrophils Absolute   Date Value Ref Range Status   07/05/2020 2.18 2.0 - 8.8 10*3/uL Final     Neutrophils, Absolute   Date Value Ref Range Status   11/16/2020 2.49 1.70 - 7.00 10*3/mm3 Final     Lymphocytes Absolute   Date Value Ref Range Status   07/05/2020 1.23 0.7 - 5.5 10*3/uL Final     Lymphocytes, Absolute   Date Value Ref Range Status   11/16/2020 1.28 0.70 - 3.10 10*3/mm3 Final     Monocytes Absolute   Date Value Ref Range Status   07/05/2020 0.50 0.0 - 1.7 10*3/uL Final     Monocytes, Absolute   Date Value Ref Range Status   11/16/2020 0.09 (L) 0.10 - 0.90 10*3/mm3 Final     Eosinophils Absolute   Date Value Ref Range Status   07/05/2020 0.12 0.0 - 0.8 10*3/uL Final     Eosinophils, Absolute   Date Value Ref Range Status   11/16/2020 0.07 0.00 - 0.40 10*3/mm3 Final     Basophils Absolute   Date Value Ref Range Status   07/05/2020 0.01 0.0 - 0.2 10*3/uL Final     Basophils, Absolute   Date Value Ref Range Status   11/16/2020 0.01 0.00 - 0.20 10*3/mm3 Final     Immature Grans, Absolute   Date Value Ref Range Status   07/05/2020 0.00 <1 10*3/uL Final     nRBC   Date Value Ref Range Status   10/02/2020 0.1 0.0 - 0.2 /100 WBC Final       Lab Results   Component Value Date    GLUCOSE 134 (H) 10/02/2020    BUN  10/02/2020      Comment:      Testing performed by alternate method    BUN 26 (H) 10/02/2020    CREATININE 1.00 10/02/2020    EGFRIFNONA 56 (L) 10/02/2020    EGFRIFAFRI >60 06/07/2019    BCR  10/02/2020      Comment:      Testing not performed    K 4.1 10/02/2020    CO2 25.0 10/02/2020    CALCIUM 9.1 10/02/2020    ALBUMIN 3.90 09/30/2020    LABIL2 1.3 07/03/2020    AST 20 09/30/2020    ALT 11  09/30/2020           ASSESSMENT:    1. Recurrent ovarian cancer metastases to the brain was on carboplatinum, Doxil.  Patient had had carboplatinum Taxol, carbo Taxotere, Gemzar single agent, Niraparib and was on Doxil and carboplatinum.  Doxil was discontinued due to approaching of lifetime dosing.  Refer to paradigm testing for future options at time of progression.  Patient has had progression of disease and therefore platinum was discontinued.  Topotecan single agent has been recommended.   2. Currently on single agent topotecan   3. Progressive brain metastasis: Status post stereotactic brain radiation under the care of Dr. Delong  4. Iron deficiency anemia: Intermittently on  Iron  5. History of DVT on anticoagulation therapy with warfarin  6. Pancytopenia: Due to chemotherapy: On Procrit  7. Hypomagnesemia: Continue to monitor levels and infuse as needed  8. B12 deficiency on parenteral B12 injections  9. Rash on face likely drug induced:resolved  10. Monitoring for chemotherapy toxicities  11. Warfarin in anticipation of cataract next week on 11/23/2020.  Patient to restart anticoagulation therapy at the discretion of her surgeon  12. Assessment has been reviewed and updated          PLANS:       Continue topotecan post cataract surgery  Continue chemotherapy with topotecan but with 20% dose reduction due to cytopenias:    monthly: Check levels today.  Ongoing  Continue magnesium oxide 400 mg three times a day and weekly IV replacement as needed.  Ongoing management  Continue b12 injections IM monthly. Transitioned injections to be done at the cancer center  Continue Retacrit 40,000 units subcu weekly for hemoglobin less than 10, For chemotherapy-induced anemia  Magic mouthwash PRN for mucositis  Schedule CT scan of the chest, abdomen and pelvis a few days prior to the next appointment in 3 weeks  All questions answered to the best of my abilities  Follow-up in 3 weeks  All questions answered           I  have reviewed labs results, imaging, vitals, and medications with the patient today. Will follow up in 3 weeks  with me.    All questions answered to the best of my abilities    Patient verbalized understanding and is in agreement of the above plans.      I spent 40 total minutes, face-to-face, caring for Tahira today.  90% of this time involved counseling and/or coordination of care as documented within this note.

## 2022-02-24 ENCOUNTER — TELEPHONE (OUTPATIENT)
Dept: ONCOLOGY | Facility: CLINIC | Age: 67
End: 2022-02-24

## 2022-02-24 NOTE — TELEPHONE ENCOUNTER
Received a call from the pt's daughter stating that the pt passed away. I told her to let us know if she needs anything. She verbalized understanding.

## 2022-11-29 NOTE — PROGRESS NOTES
Hematology/Oncology Outpatient Follow Up    PATIENT NAME:Tahiar Zimmerman  :1955  MRN: [id number]  PRIMARY CARE PHYSICIAN: Noemy Queen MD  REFERRING PHYSICIAN: Noemy Queen MD    Chief Complaint   Patient presents with   • Follow-up     Malignant neoplasm of right ovary        HISTORY OF PRESENT ILLNESS:     1. Recurrent ovarian cancer diagnosis established in 2013.    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  The patient was treated with surgery and then postoperatively with adjuvant chemotherapy consisting of Carboplatin and Taxol.  Six months later, she had recurrence of disease intra-abdominally between the rectum and vagina, which was treated with intraabdominal peritoneal chemotherapy.  She had been free of disease since that time.  She reported feeling a mass in the left side of her abdomen approximately six months ago.  This gradually grew and in early 2013 she had her daughter feel the mass.  The daughter works for Dr. David Rogers and the patient at that time also complained of intermittent rectal bleeding.  Consultation with Dr. David Rogers was obtained.  The patient had a CT scan of the abdomen and pelvis performed on 13 revealing left lower quadrant mass measuring 9.7 x 9.3 x 6 cm demonstrating areas of dense calcification, soft tissue density and cystic density within it.  Stromal tumor is felt to be most likely a possibly fibroma.  It is generated with necrosis and calcification.  Possible palpable mass in the rectus muscle is seen with calcification and deformity of the rectus muscle and just below this a second mass intra-abdominally was seen measuring 2 cm in the greatest diameter suspicious for second intraabdominal tumor.  The mass separate from the largest mass measuring 2.6 cm in the dependent portion of pelvis is seen on the right of the midline.  No  retroperitoneal lymphadenopathy was seen.  No free air, free fluid or other abnormality was present.  The patient was scheduled for an outpatient colonoscopy, but had syncopal episode while sitting in the toilet the night before and was brought to the emergency room early in the morning by her daughter and son-in-law.  The patient had apparently stopped breathing for a few seconds and did not have a pulse, but started breathing before CPR could be initiated.  In the emergency room, the patient had an EKG revealing sinus rhythm nonspecific T-wave flattening; metabolic panel revealed a glucose of 148, creatinine of 1.3, CBC was normal.  She was treated with intravenous fluid.  The patient’s case was then discussed with Dr. Anabell Monique and patient was subsequently admitted to [facility].  The patient underwent a colonoscopy by Dr. David Rogers on 06/27/13 revealing sigmoid diverticulosis and grade II internal and external hemorrhoids, but no mass or colitis was seen.  CT-guided biopsy of the mass was performed on 06/27/13 as well revealing metastatic papillary adenocarcinoma with numerous psammoma bodies.  Pathology comment stated prior records were not available; however, with history of ovarian cancer the current biopsy would be consistent with metastases from that malignancy.  Oncology consult was obtained and I initially saw the patient on 06/27/2013 where the patient had claimed to have little bit of pain in the area of disease.  She reported being frequently constipated, denies any weight loss or fevers.  She did report having some night sweats, but thought that was because of her menopause.  On 06/27/13 metastatic workup including labs and CT scans were initiated.  CT scan of the chest on 06/27/13 revealed no acute disease in the chest.  A 1.4 cm size focal area of decreased density was noted in the lateral aspect of the right lobe of the liver consistent with a benign cyst or hemangioma.   There was a very small hiatal hernia measuring 2.2 cm in diameter.  A CT scan of the head performed 06/27/13 revealed no acute intracranial abnormality noted.  CA-125 was 40 units/ml (0-35), CA 19-9 was 11.8 units/ml (0-35), CEA level was 0.8 ng/ml (0-3), TSH level was 1.09 IU/ml (0.34-5.6).  The patient was in workup for the syncopal episode, a carotid Doppler was performed on June 28 revealing an essentially normal study showing a reduction in diameter from 0-15% bilaterally.  A Holter monitor was completed on 06/27/13, which was unremarkable except for a few premature atrial contractions.  The patient was felt stable and subsequently was discharged home on 06/28/13.  Post discharge, the patient was seen at [facility] on 07/05/13 by Dr. Kathryn Thrasher who discussed treatment options with the patient including surgery.  The patient is in the office today 07/16/13 in follow up post hospitalization.  She is reporting that surgery is planned for July 30th of this month at .  · 7/30/13 to 8/3/13 - The patient was in [facility].  The patient was admitted for surgery for her recurrent ovarian cancer.  The patient had exploratory laparotomy, omentectomy, bowel resection, liver biopsy and appendectomy.  Pathology revealed of the omentum metastatic serous carcinoma of the transverse colon, segmental resection showed metastatic serous carcinoma involving mesenteric fat.  The liver wedge resection showed fibrosclerotic thickening of the hepatic capsule.  Benign liver parenchyma.  No evidence of metastatic tumor.  Appendix showed fibrous obliteration of the lumen.  Negative for metastatic carcinoma.  Rectum and sigmoid colon segmental resection showed metastatic serous carcinoma invading the colorectal mucosa uninvolved by tumor.  Vaginal mass showed metastatic serous carcinoma.  Margins are positive for tumor.  · 9/24/13 - Chemotherapy orders were written for patient to start  carboplatin 550 mg IV day one and Taxol 330 mg IV day one to be cycled every three weeks.  · 10/10/13 - The patient started cycle #1 of chemotherapy consisting of Carboplatin and Taxol.  · 09/25/13 -  is 10.6 normal.  · 10/01/13 - CT of the chest, abdomen and pelvis revealed new reticular nodular occupancy in the anterior segment of the right upper lobe, could reflect an inflammation or infectious etiology or could reflect unusual persistence of metastatic tumor.  New liver lesions, the smaller one appears to be implant on the surface of the liver, the larger may also represent an implant including the intrahepatic.  Several small mesenteric nodes seen throughout the abdomen and pelvis.  · 10/02/13 - Port placed by Dr. Sen.  · 10/24/13 - Orders written to start Neupogen daily and if more than three Neupogen are needed to give Neulasta after next chemo.  ANC is 0.15.  · 10/31/14 - Patient given cycle 2 of chemotherapy with Taxol and Carboplatin.  · 11/21/13 - The patient started cycle 3 of chemotherapy consisting of Carboplatin and Taxol.  · 11/26/13 - CT scan of chest, abdomen and pelvis revealed no evidence of active disease in the chest.  Reticulonodular opacity has resolved.  Metastatic lesion impressing upon the hepatic dome similar to prior exam.  No evidence of a change.  No evidence of new disease.  Postsurgical changes in the pelvis and throughout the retroperitoneum in the large bowel.  Finding containing anterior abdominal wall hernia containing fat tissue and enhancing vessels, 3 cm in diameter.  · 12/2/13 - Orders written to start Neupogen per protocol as well as Aranesp due to low hemoglobin and ANC.  ANC is 0.14.  Hemoglobin is 9.7.  · 12/11/13 - Orders written to hold chemotherapy until next week and decrease dose by 20% due to low platelet count.  Platelet count is 85,000.  · 12/16/13 - The patient received cycle 4 of Carboplatin and Taxol at a 20% dose reduction so Taxol dose is 260 mg and  Carboplatin is 440 mg.  · 12/16/13 - CA-125 12.6 (N).  · 12/19/13 - PET/CT scan.  Impression:  1. There is no evidence of hypermetabolism in the liver.  Specifically of the dominant lesion in or adjacent to the right hepatic dome that was seen and described on the recent CT study of 11/26/2013.  It is photopenic relative to the surrounding liver.  2.  There is no evidence of hypermetabolic soft issue mass lesion or free fluid in the abdomen or pelvis.  3.  The tonsillar tissues are slightly enlarged and have moderate activity level.  This maybe physiologic.  Correlation with oral cavity, examination is recommended.  4.  Mild amount of activity in the right level II jugular lymph chain without focally enlarged lymph nodes is of questionable significance.  · 1/6/13 - The patient received cycle 5 of chemotherapy with Taxol and Carboplatin.  · 1/27/14 - The patient started on cycle 6 of Taxol and Carboplatin.  · 2/18/14 - CT of the abdomen and pelvis with contrast; stable lesions impressing upon the hepatic dome, unchanged compared to the prior exam.  Multiple anterior abdominal wall hernias re-demonstrated.  Those above the umbilicus contain omental fat.  Below and to the left of the umbilicus as anterior abdominal hernia containing omental fat in nondilated small bowel which is larger than seen previously.  · 2/20/14 - The patient received cycle 7 of chemotherapy with Taxol and Carboplatin.   · 3/11/14 - Order written to discontinue chemotherapy due to patient has completed 7 cycles of chemotherapy and CT scans suggest possible remission.  · 3/11/14 -  11.0 (N).  · 06/05/14 - CT scan of the chest abdomen and pelvis with contrast: There are multiple anterior abdominal wall hernias.  There are 2 larger anterior abdominal wall hernias at the level of the transverse colon, which contain omental fat.  There are at least 2 small anterior abdominal wall hernias that contain omental fat.  There is a moderate sized  anterior abdominal wall hernia at the level of the umbilicus, eccentric to the left, which contain non-dilated bowel.  Interval decrease in the size of two deposit impressing on the liver.  The third tiny deposit has been stable.  · 8/19/14 -  10.4 (N).  · 12/19/14 -   10.0 (N)  · 1/7/15 - CT chest, abdomen and pelvis with stable appearance of the chest with several micronodules. Small hiatal hernia, slightly larger than previous exam, increased from 1.5 to 2.5 cm in diameter. Bochdalek hernia unchanged. Multiple hepatic lesions. The two dominant lesions at the right hepatic dome had decreased in size from previous. The remaining tiny nodules measured 3-5 mm in diameter and were unchanged. There was no evidence of new intra-abdominal or pelvic mass or free fluid. There were multiple anterior abdominal wall hernias which had increased in size.   · 7/27/15 - Comprehensive metabolic panel with no significant abnormalities. CA-125 of 21 (0-35).   · 10/26/15 - WBC 6, hemoglobin 13, platelet count 204,000. Heart rate 47. CA-125 of 20 (0-35).    · 2/18/16 - CT chest with contrast with stable micronodular density seen in the lungs without significant change from prior exam. CT abdomen and pelvis with regression of liver lesions with no new evidence of metastasis. Multiple ventral hernias again noted without significant change from prior exam. Creatinine 0.9 (0.6-1.3).    · 2/25/16 - Patient hospitalized at [facility] between 2/25/16 and 2/27/16 with pyelonephritis secondary to E-coli. She was given two days of IV Rocephin and then placed on oral Levaquin. She was asked to hold her Coumadin, but then had nausea and vomiting because of Levaquin and stopped the Levaquin also. Her CA-125 was 32 (0-35) and CEA 1.3 (0-5). Her urine grew >100,000 E-coli. CT of the abdomen and pelvis was done which revealed 4.1 x 1.8 cm sized mild ovoid area of decreased density in the liver parenchyma along the anteromedial  aspect of the dome of the right lobe of the liver, unchanged significantly since the previous study of 2/18/16. There was suspicion of a very small pericardial effusion. There was suspicion of a small hiatal hernia. There were several hernias in the anterior abdominal wall. A 3.5 x 3.1 cm incised well-circumscribed abnormal density in the left retroperitoneal fatty soft tissue at the level of the left iliac crest was suggestive of tumor recurrence. There was an abnormal density in the left retroperitoneal soft tissues in the left flank that partially surrounded the left kidney and extended downward to the left pelvic area. Diverticulosis of the distal descending colon and sigmoid colon were seen.     · 3/8/16 - Patient prescribed Bactrim DS 1 p.o. b.i.d. for 10 days for pyelonephritis, which was partially treated with Rocephin and Levaquin. Urine with 40,000 E-coli treated with Macrobid for 7 days.  of 20 (0-35).    · 3/31/16 - PET scan with area of low density anteriorly in the right lobe of the liver with no significant radiopharmaceutical uptake to suggest malignancy. Multiple round soft tissue density masses showing increased radiopharmaceutical activity and multiple anterior abdominal wall hernias.   · 5/10/16 - Patient complains of venous enlargement of the left chest wall around the port. Has not been seen by  yet, but has an appointment on 5/23/16. Complained of a cough.   · 5/12/16 - Infusaport contrast study with no evidence of fibrin sheath. Chest x-ray with mild cardiac prominence.   · 5/23/16 - Patient seen in consultation by Sheng Bowman M.D. at [facility] and a chemotherapy trial recommended.   · 6/1/16 -   of 27.1 (0-35).    · 6/14/16 - WBC 5.71, hemoglobin 12.4, platelet count 188,000. Patient is scheduled for surgery at  on 6/16/16 after further discussion with  physicians. Discussed adjuvant chemotherapy.   · 6/16/16 - Excisional biopsy of abdominal wall mass  performed by Sheng Bowman M.D. at [facility] with pathology revealing metastatic high-grade papillary serous carcinoma.   · 7/7/16 - Received a call from the patient that Tramadol was not working and that she was given Norco #24 after her biopsy at  which did help her pain. Patient prescribed Norco 5/325 one p.o. q. 4-6 hours p.r.n. #60 with no refills. Echocardiogram with left ventricular inferior wall hypokinesis with ejection fraction of 50-55%.   · 7/8/16 - Patient seen in consultation by Sheng Bowman M.D. in consultation at  for metastatic high-grade papillary serous carcinoma diagnosed by excisional biopsy on 6/16/16 with carcinomatosis and patient not a surgical resection candidate. Genetic sequencing and susceptibility testing of tumor was ordered. Biopsy was done of anterior abdominal wall.   · 7/7/16 - Echocardiogram with left atrial enlargement. Mild mitral regurgitation. Left ventricular proximal inferior wall hypokinesis with ejection fraction of 50-55%.   · 7/11/16 - While waiting for genomics results, in order not to delay treatment further, order written for Taxotere 60 mg/M2 IV over one hour followed by Carboplatin AUC 5 over one hour, cycles to be repeated every three weeks.  Comprehensive metabolic panel normal.  26 (0-35).    · 725/16 - Pain contract signed for use of Norco.   · 8/5/16 - Patient stated that she experienced a red rash and was itchy post taking Norco. Norco discontinued and Tramadol refilled.   · 8/16/16 - Patient started cycle 1 chemotherapy. WBC 7.1, hemoglobin 11.2, MCV 88.7, platelet count 94,000. Patient complained of nausea for a week post chemo. Already getting Emend, Aloxi and Decadron. Added Omeprazole 40 mg daily orally.   · 8/17/16 - Urine culture with >100,000 E-coli.   · 8/25/16 - Cycle 2 chemotherapy given.   · 9/9/16 - Magnesium 1.3, replaced intravenously. Potassium 3.4 (3.6-5.1), increased oral potassium supplementation.    · 9/16/16 -  Cycle 3 chemotherapy given.   · 9/19/16 - Comprehensive metabolic panel with no significant abnormality.   · 9/20/16 - CT abdomen and pelvis with evidence of progressive metastatic disease in the abdomen and pelvis with interval enlargement of several soft tissue attenuations and subcutaneous nodules in the anterior abdominal wall. Interval enlargement of a left pelvic soft tissue attenuation mass. A lentiform area of low attenuation in the dome of the liver stable in size. Multiple anterior abdominal wall hernias containing fat and/or bowel. Marked pelvic floor laxity. CT chest negative for evidence of metastatic disease. Tiny pulmonary nodules in the right lung stable since 2013 consistent with a benign etiology.   · 9/23/16 - Macrobid 100 mg p.o. b.i.d. x7 days prescribed for UTI. Current chemotherapy discontinued after discussion and new chemotherapy orders written. Carboplatin AUC-5 IV day 1 and Doxil (Liposomal Doxorubicin) 30 mg/M2 IV day 1 to cycle q. 21 days.  of 18 (0-35). Magnesium 1.6, replaced intravenously. Comprehensive metabolic panel with potassium 3.4 (3.6-5.1).     · 10/13/16 - Patient started on cycle 1 of Carboplatin and Liposomal Doxorubicin followed by Neulasta.   · 11/3/16 - Cycle 2 Carbo and Doxil given.   · 11/17/16 - Magnesium 1.0, replaced intravenously. Oral potassium supplement increased.   · 11/29/16 - CT chest with small non-calcified right pulmonary nodules unchanged. Benign bone island in the midthoracic vertebral body along with benign bony hemangiomas in the middle thoracic vertebral bodies. CT abdomen and pelvis with mild progressive metastatic disease from CT of September 2016. Anterior abdominal wall mass superiorly mildly increased in size with new area noted as described measuring 3 x 1.7 cm. 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 Stable area of decreased uptake in the dome of the liver not hypermetabolic on recent PET  scan, likely representing cyst or focal fatty infiltration. Stable small hiatal hernia, fat-containing Bochdalek’s hernia and anterior abdominal wall hernias with stable pelvic floor relaxation.    · 12/1/16 - Cycle 3 chemotherapy given.   · 12/8/16 - Current chemotherapy discontinued and patient started on Gemcitabine 1000 mg/M2 IV days 1, 8, 15 q. 28 days.   · 12/22/16 - Patient seen in followup by Juliana Roy M.D. at the pain clinic, treated with Oxycodone and Lyrica. Transaminases normal. Patient started cycle 1 chemotherapy.   · 12/29/16 - Magnesium 1.1 (1.8-2.5), replaced intravenously.   · 1/5/17 - Cycle 1 day 15 chemotherapy held as patient leaving for vacation to [place]. Chemotherapy dose decreased by 20%. Patient complains of diarrhea for which Imodium prescribed.   · 1/11/17 - BRCA-1 and BRCA-2 negative.   · 1/12/17 - Echocardiogram with ejection fraction 60% or greater.   · 1/19/17 - Comprehensive metabolic panel with no significant abnormality. Patient started cycle 2 chemotherapy.   · 2/2/17 - Urine with >100,000 E-coli treated with Cipro 500 mg p.o. b.i.d. x1 week.   · 2/6/17 - Magnesium 1.1 (1.8-2.5), replaced intravenously.    · 2/23/17 - Patient started cycle 3 chemotherapy.   · 2/27/17 - CT chest with interval development of too numerous to count very small pulmonary nodules, ill-defined ground glass opacities concerning for development of pulmonary metastatic disease. Stable left-sided chest port. CT abdomen and pelvis with stable appearance of multiple small soft tissue densities within the abdomen near midline subcutaneous fat of the abdominal wall. Interval decrease in size of largely calcified left pelvic mass and multiple fat in bowel containing small ventral hernias.   · 3/6/17 - Pulmonary consultation obtained for lung nodules. Discussed CT results with patient. Decision made to continue present chemotherapy until further lung evaluation as abdominal disease better.  of 18  (0-35).    · 3/16/17 - Patient started cycle 4 chemotherapy, but treatment held from day 8 onwards because of hospitalization.   · 3/19/17 - Patient was hospitalized at [facility] between 3/19/17 and 3/25/17 with chest pain. Chest x-ray had revealed increased mild bibasilar airspace disease. Troponin I and EKG’s were negative. INR was elevated. Patient was treated with antibiotics. EGD was performed revealing small hiatal hernia and esophageal dilatation was performed. Bronchoscopy was performed also with no intrabronchial lesions identified. IgA was 51 (), IgG 320 (600-1500) and IgM 19 (). Lexiscan nuclear stress test had no evidence of reversible myocardial ischemia with normal left ventricular ejection fraction of 58%. CT chest had development of patchy bilateral ground glass opacities most pronounced involving the left upper lobe and bilateral lower lobes. Multiple tiny bilateral pulmonary nodules were less conspicuous. The patient underwent a bronchoscopy by Jerica Esparza M.D. with right upper lobe bronchoalveolar lavage revealing benign squamous cells and bronchial cells with pulmonary macrophages present. There was mild acute inflammation. Negative for malignant cells. Prilosec was changed to Famotidine for hypomagnesemia.    · 4/6/17 - Magnesium 1.2 (1.8-2.5). Comprehensive metabolic panel with no significant abnormality. Patient seen in follow-up by Fito Ram M.D. at [facility]. Treated with Lyrica and Oxycodone.    · 5/4/17 - Patient complains of a rash itching on her right upper arm. Eczematous rash noted and patient prescribed Cyclocort 0.1% b.i.d. Magnesium infusions continued with each chemo. Order written to resume chemotherapy.   · 5/16/17 - Cycle 5 chemotherapy started.   · 5/26/17 - Magnesium 1.4 (1.8-2.5), replaced intravenously. Potassium 2.9 (3.6-5.1), KCL increased to 20 mEq three in the morning and three in the evenings.   · 5/30/17 - PET scan with remaining metabolic  activity within the nodular areas. The suggested mass which is partially calcified and necrotic within the left aspect of the pelvis seems slightly larger with increased metabolic activity. There was more calcification in these areas compared to prior study, suggesting inflammation.      · 6/8/17 - Patient complaining of shortness of breath. Does take HCTZ at home. Chest x-ray ordered and chemotherapy continued along with weekly magnesium replacement. Chest x-ray with no acute cardiopulmonary abnormalities.   · 6/13/17 - Patient received cycle 6 of chemotherapy.   · 7/31/17 - WBC 5.0, hemoglobin 11.2, MCV 89.7, platelet count 217,000. Patient is to resume chemotherapy starting with cycle 7 on Wednesday of this week.  of 19 (<35). Potassium 3.3 (3.5-5.3).     · 8/2/17 - Patient began cycle 7 of chemotherapy.   · 8/30/17 - Patient started cycle 8 chemotherapy.   · 9/5/17 - Patient seen in followup at Pain Management by Fito Ram M.D. and continued on treatment with Oxycodone and Lyrica.   · 9/13/17 - Patient was hospitalized at [facility] for a day with dizziness secondary to dehydration, hypokalemia and anemia. She was given 1 unit of packed red blood cells and electrolytes were replaced. Chest x-ray revealed a subtle opacity in the left lateral lung base favored to represent atelectasis. Magnesium 1.3 (1.5-2.5), patient continued on replacement.    · 9/22/17 - Chemotherapy and magnesium replacement continued with decrease in chemotherapy dose by 10% secondary to cytopenias.   · 9/25/17 - Cycle 9 chemotherapy started.   · 10/12/17 - CT of the chest with contrast showed several tiny pulmonary nodules. One within the right lower lobe appears new from prior exams. Two adjacent foci of nodularity within the medial right lower lobe suggestive of minor infectious or inflammatory process. CT of the abdomen and pelvis with contrast which showed soft tissue nodules along the anterior abdominal and pelvic walls  unchanged from previous scan. Also a partially calcified mass within the left pelvis unchanged. No acute process identified within the abdomen or pelvis. Several left paracentral ventral hernias unchanged. No evidence of acute bowel obstruction.   · 10/16/17 - Order written for Levaquin 500 mg p.o. daily as the patient states that she has had a productive cough, fever and chills and with the results of the CT scan showing a possible infectious versus inflammatory process. Order also written to continue chemotherapy and magnesium replacement as previously prescribed.   · 10/23/17 - Cycle 10 chemotherapy started.   · 11/19/17 - Patient went to the Emergency Room at [facility] complaining of right lower extremity redness and fever for a day. Venous Doppler had no evidence of a DVT and patient was discharged home on Clindamycin.   · 11/21/17 -  of 17 (0-35).   · 12/4/17 - Cycle 11 chemotherapy started.   · 12/20/17 - PET scan with multiple hypermetabolic soft tissue nodules abutting the anterior abdominal wall, stable from previous examination. Peripherally calcified left lower quadrant mass near the ascending colon stable in size demonstrating no hypermetabolic uptake. Previously had maximum SUV of 7. Right medial basilar pulmonary nodules resolved.   · 12/22/17 - CT left lower extremity with soft tissue swelling with skin thickening present along the anterior and medial aspect of the leg, slightly more pronounced around the palpable marker seen within the upper medial aspect of the lower extremity.   · 12/26/17 - Elastic stockings prescribed for lower extremity edema.   · 1/8/18 - Patient hospitalized at [facility] between 1/8/18 and 1/11/18 with fever of up to 101 degrees and painful rash on the lower extremities of several weeks’ duration. She was found to be hypokalemic and MRI of the lower extremities revealed thickening and swelling. Chest x-ray had no acute cardiopulmonary abnormality. Skin biopsy was performed by  Surgery revealing stasis dermatitis and no evidence of malignancy. Infectious Disease consultation was obtained and IV antibiotics were stopped. Blood cultures had no growth.   · 1/22/18 - Patient asked to start wearing elastic stockings which she has not started yet. She was given a prescription for Dyazide 1 p.o. daily.   · 2/26/18 - Ordered chemo to resume again. Patient unaware that she was supposed to resume chemo after her last visit in January. Continue magnesium infusions on day 1, 8 and 15. Prescribed Levaquin 500 mg p.o. daily x10 days for productive cough x1 week. History of viral illness consistent with influenza.  of 18 (0-35). Chest x-ray with no acute process.    · 3/5/18 - Cycle 12 chemotherapy started.   · 3/26/18 - Options discussed with patient. Decision made to discontinue IV Gemzar and orders written to start on Niraparib (Zejula) 300 mg p.o. daily as maintenance therapy.   · 4/11/18 - Niraparib discontinued due to insurance denial. Orders written to start Carboplatin-AUC 5 IV day 1 cycling every 21 days. Orders written for Neulasta 6 mg subcutaneous kit day 1 with palliative treatment intent and expected duration of treatment three months.   · 4/12/18 - CT chest, abdomen and pelvis with contrast revealed numerous tiny centrilobular nodules in the lungs favored to reflect infectious or inflammatory process. Nodules ranged 1-3 mm in size and are new from prior studies with metastatic disease not entirely excluded. Stable small hiatal hernia. Interval progression of metastatic disease involving the subcutaneous tissues at the ventral abdominal wall. Multiple soft tissue density nodules previously shown to be hypermetabolic on PET scan. New small crescent of low attenuation material along the periphery of the spleen with similar finding at the dome of the liver. Findings are concerning for peritoneal metastases. Density of the dome of the liver remained unchanged from multiple prior studies.  Stable calcified mass in the left pelvic sidewall. Urinary bladder wall thickening.   · 4/23/18 - Cycle 1 chemotherapy with Carboplatin administered along with Neulasta injection.   · 4/26/18 - Neulasta discontinued due to insurance denial.   · 5/14/18 - Patient received cycle 2 Carboplatin.   · 6/5/18 - Cycle 3 day 1 chemotherapy with Carboplatin administered.   · 6/20/18 - CT chest, abdomen and pelvis at [facility] showed re-demonstration of soft tissue mass in the ventral wall hernia left of the midline as well as the dome of the liver, likely representing metastatic disease similar as compared to the prior study. Additional calcified lesions present in the upper left hemipelvis and along the anterior abdominal wall to the right of the midline also likely representing metastatic disease. No definite new lesions were identified. Moderate to large stool burden likely related to mild constipation was present. No suspicious lung nodules were identified. The previously-described ground glass nodules appeared to have resolved. There was a stable subpleural nodule in the right upper lobe measuring 3 mm present since 2014 without significant changes.   · 6/26/18 - Cycle 4 day 1 Carboplatin administered with addition of Neulasta for febrile neutropenia prophylaxis.   · 6/29/18 - Reviewed CT scans with patient. Orders written to discontinue Carboplatin and Neulasta and plan to start Niraparib 300 mg by mouth daily as maintenance therapy. Prescribed Amlodipine 2.5 mg p.o. daily for chemo-induced hypertension.   · 7/18/18 - Orders written to increase weekly Magnesium Sulfate to 3 g IV weekly due to continued hypomagnesemia.   · 8/1/18 - Patient reports she has not received Niraparib (Zejula) to date.   · 8/30/18 - Patient’s phone was not working so though Niraparib approved, the drug company had not been able to get ahold of her. Patient supplied with drug company number so that she can start taking the pills.    · 9/6/18 -  of 20 (N).   · 9/18/18 - Urinalysis done for dysuria and back pain. Urine culture grew 20,000 to 50,000 colonies of urogenital ruy. Prescribed Bactrim DS one tablet twice daily for one week.   · September 2018 - Patient initiated on Zejula 300 mg p.o. daily.   · 10/1/18 - WBC 3.5, hemoglobin 12, platelet count 74,000. Niraparib (Zejula) dose held and then resumed when platelets above 100,000 at a dose of 200 mg daily.   · 10/15/18 - Patient received Niraparib (Zejula) at 200 mg p.o. daily with a platelet count of 198,000.   · 11/7/18 - WBC 6, hemoglobin 11.6, MCV 96.2, platelet count 137,000. Patient claims to have stopped taking Niraparib a few days prior because of cough. Asked to resume taking it. Ciprofloxacin 500 mg p.o. twice daily for one week for bronchitis.   · 12/3/18 - Magnesium Sulfate infusions changed to every two weeks, to send mag level before and give 3 grams. DC’d Magnesium Oxide and started Magnesium Plus Protein two pills twice daily.   · 1/4/19 - CT chest, abdomen and pelvis with new patchy nodular areas of airspace consolidation within the bilateral upper lobes, left greater than right, favored to be infectious or inflammatory. Interval enlargement of the peritoneal soft tissue masses within the pelvis compatible with progression of disease. Enlarging ventral hernia now containing multiple loops of small bowel and colon.   · 1/7/19 - Patient has not been coming in for weekly magnesium replacement as nursing has not been able to get ahold of her. Results of CT’s discussed with patient. Decision made to change her to IV chemotherapy with Carboplatin AUC-5 day 1 and pegylated liposomal Doxorubicin (Doxil) 30 mg/M2 IV day 1 to cycle every four weeks. Patient made aware of the limited choices of her treatment available.   · 1/31/19 - Echocardiogram showed LVEF 50-55% and otherwise normal echo Doppler study.   · 2/4/19 - New chemotherapy not initiated to date with difficulty  contacting patient for echocardiogram. Neulasta On-Pro 6 mg ordered with chemotherapy due to extensive prior exposure to chemotherapy, increasing risk of febrile neutropenia, history of neutropenic events on prior chemotherapy regimens.   · 2/15/19 - Patient started cycle 1 chemotherapy with Neulasta support.   · 3/6/19 -  of 28 (0-35).   · 3/15/19 - Cycle 2 Carboplatin with Liposomal Doxorubicin (Doxil) and Neulasta support initiated.     · 4/10/19 - Patient was requested to followup with Dr. Queen regarding hypotension and blood pressure medication dosing. Patient appears to be tolerating treatment well with cytopenias not requiring dose adjustments. No Doxil-related skin or mucus membrane toxicities or other significant toxicities to date.   · 4/12/19 - Cycle 3 chemotherapy given.   · 4/16/19 - CT chest, abdomen and pelvis with no evidence of active metastasis to the chest. Several tree-in-bud nodules within the periphery of the inferior right upper lobe likely infectious or inflammatory. Disease burden within abdomen and pelvis stable to slightly increased from prior CT. Specifically, a soft tissue mass along the right rectus muscle slightly increased in bulk. Multiple ventral hernias, some containing loops of bowel, with no evidence of acute bowel obstruction.   · 5/8/19 - Discussed CT results with patient. Overall stable disease and would like to continue same treatment. Dove soap and Hydrocortisone Cream p.r.n. prescribed for scattered rash on back.   · 5/10/19 - Cycle 4 Carboplatin and Liposomal Doxorubicin initiated.   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 It was ER positive, HER2/zuleima negative, PD-L1 negative. There was TOPO1 positivity and TUBB3 positivity. TMB was low (two mutations per megabyte MUTS/MB) and MSI was stable. There were 13 therapies considered with increased benefit. The 13 therapies with increased benefit  included Fulvestrant, Irinotecan, Letrozole, Topotecan, Anastrazole, Exemestane, Tamoxifen, all of which were referenced to [facility] as well as Abemaciclib, Everolimus, Gefitinib, Palbociclib, Ribociclib and Toremifene.     · 6/5/19 echocardiogram with preserved LV systolic function with EF around 60%.  · 6/7/19 cycle 5 chemotherapy with Neulasta support given.  Patient continued on mag oxide 400 mg p.o. twice daily and weekly IV 3 g mag sulfate infusions for persistent hypomagnesemia.  · 7/5/2019- cycle 6 chemotherapy with carboplatin and doxorubicin with Neulasta port initiated.  Ca125:  21 (0-35).  · 7/23/2019-CT chest abdomen and pelvis compared to CT from 4/16/2019 revealed multiple small pulmonary nodules unchanged from prior examination within the right upper and left lower lobes.  Complex ventral hernia similar to prior exam.  Soft tissue nodule along the right lateral margin of the right of the midline measuring 12 x 10 mm unchanged in size.  Irregular soft tissue nodular thickening along the margin of the most inferior aspect of the complex ventral hernia with thickness measuring up to 13 mm similar to prior examination.  Extensive calcified and soft tissue mass within the left lower quadrant decrease in size now measuring 2.6 x 2.3 cm previously 3.0 x 2.5 cm.  Partially calcified mass involving the right rectus sheath measuring 3.1 x 2.7 cm previously measured 3.3 x 2.9 cm.  Densely calcified mass measuring 2.1 x 1.6 cm unchanged previously measured 2 x 1.7 cm.  Right external iliac chain lymph node measuring 9 mm previously measured 5 mm.  · 8/2/2019 cycle 7 chemotherapy given.  · 8/30/2019-cycle 8 chemotherapy with carboplatin and doxorubicin with Neulasta support given.  · 9/27/2019 cycle 9 chemotherapy given.  · 10/1/19 - Echocardiogram showed Normal LV size and contractility EF of 60-65%. Normal RV size. Borderline left atrial enlargement. Aortic valve, mitral valve, tricuspid valve appears structurally  normal, no significant regurgitation seen.No pericardial effusion seen. Proximal aorta appears normal in size.  · 10/18/19 - Magnesium 1.5 (1.8-2.5).  IV magnesium replacement continued.  · 10/25/2019 cycle 10 chemotherapy given.  · 10/29/2019 patient had CT scan of the chest abdomen and pelvis-there is a new 2 mm nodule in the left lower lobe with a stable 2 mm nodule in the left lower lobe.  Follow-up in 6 months was recommended.  Stable multiple soft tissue density and calcified nodules within the ventral abdominal wall and left retroperitoneal fat consistent with nonviable metastatic disease.  These nodules have either decreased in size or stable.  · 11/22/2019 10 received cycle 11 of chemotherapy with Doxil and carboplatinum with Neulasta  · 12/8/2019 to 12/11/2019 patient was admitted to the hospital for neutropenic fever.  · 12/20/2019 patient received cycle 12 of chemotherapy with Doxil and carboplatinum with Neulasta  · 1/13/20: 2 D echo was normal with EF 56% to 60%  · 1/24/20-Patient received cycle 13 of combination chemotherapy with carboplatinum and Doxil  · 2/3/2020 patient had a  which was 25.4  · 2/21/2020-patient received cycle 14 of chemotherapy with carboplatinum and Doxil  · 3/6/2020 patient had CT scan of the chest, abdomen and pelvis this revealed a 3 mm nodule in the left lower lobe present on prior CT of the abdomen unchanged from July 2019.  Stable 3 mm right upper lobe nodule.  There is a lymph node in the AP window measuring 8 x 9 mm prominent left hilar lymph node measuring 12 mm previously was 7 x 7 mm no significant adenopathy was seen in the abdomen there is an area of irregular soft tissue in the left paramidline ventral hernia which is stable measuring 5.7 cm partially calcified mass in the right rectus measuring 3 x 2.5 cm which is also stable the prominent lymph nodes in the left mid hilum and mediastinum may be reactive or metastatic disease  · 4/17/2020-patient had cycle 15  of single agent carboplatinum  · 5/26/2020 patient had CT scan of the chest, abdomen and pelvis showed 3 new lung nodules measuring 7 mm in the left upper lobe, 5 mm in the left lower lobe and 4 mm in the right lower lobe.  There were additional small lung nodules which were unchanged.  Mildly prominent mediastinal and bilateral hilar lymph nodes mildly increased in size.  Right hilar node 9 mm previously was 5 mm.  Carinal node 9 mm previously was 6 mm.  The nodule at the right side of the hernia sac has increased to 1.5 cm from 1.2 cm.  Also a nodule in the subcutaneous fat currently measures 2.4 was previously 2 cm.  · 5/15/2020-patient received cycle 16 of carboplatinum  · Since the last visit in the office patient patient developed dizzy spell and fell. For that reason she was taken to the emergency room at [place].    · 7/3/2020 she had brain MRI which showed an 8 mm focus of abnormality in the left aspect of the posterior fossa corresponding to a dural based enhancing lesion thought to represent a calcified meningioma vessels calcified dural based metastasis.  This lesion has a slight local mass-effect and a small amount of surrounding edema.  There is also a 4 to 5 mm rounded focus of abnormal nodular enhancement along the cortex of the left parietal lobe but no significant mass-effect.  This is nonspecific could represent neoplastic process, infectious/inflammatory process or granulomatous disease.  · Patient was seen by neurosurgery, follow-up brain MRI in 8 weeks has been recommended by Dr. Hartman  · 7/9/2020 patient was initiated on single agent topotecan cycle 1 day 1  · 7/16/2020 she received the 8 topotecan  · 8/6/2020 patient received cycle 1 day 15 of topotecan  · 9/2/2020 patient had brain MRI multiple hospital which showed interval increase in size of the metastasis in the left parietal lobe now measuring 9 x 7 mm.  Previously was 5 mm.  There has been interval increase in size of the left superior  cerebellar metastases now measuring 1.3 cm previously was 8 mm.  There was no new metastatic disease identified.  · 9/11/2020 patient received cycle 2-day 15 of single agent topotecan  · Was admitted to the hospital 10/1/2020 for supratherapeutic INR  · 10/13/2020 patient was seen by Dr. Delong she has started stereotactic brain radiation to be completed on 10/28/2020  · 11/6/2020 patient received cycle 3-day 1 of chemotherapy with topotecan  · 11/13/2020 patient received cycle 3-day 8 of topotecan  · 11/30/2020 patient had CT scan of the chest, abdomen and pelvis this showed new reticular nodular interstitial thickening within the right lung mostly in the right middle lobe and right lower lobe worrisome for lymphangitic spread of cancer.  Evidence of progression of metastatic disease in the chest, abdomen and pelvis the new tiny sclerotic foci within the spine worrisome for osseous metastatic disease.       2. Deep vein thrombosis of left upper extremity diagnosis established in October of 2013.  · 10/16/13 - Venous Doppler of left upper extremity.  Impression:  Deep vein thrombosis involving the subclavian, axillary and brachial veins on the left side, superficial vein thrombosis involving the left basilic vein.  · 10/16/13 - Order written for Lovenox 120 mg subcutaneously daily until INR is in therapeutic range.  Order written for Coumadin 5 mg p.o. daily.  The patient is to follow PT and INR protocol.  · 5/20/16 - Venous Doppler bilateral lower extremities normal.  · 7/30/19 - Patient continued on Coumadin following the INR protocol.      3. Anemia diagnosis established in November 2013 and pernicious anemia diagnosis established March 2016.   · 11/15/13 - Hemoglobin is 7.8.  · 12/2/13 - Orders written to start Aranesp 200 mg subcutaneous every two weeks due to low hemoglobin secondary to chemo induced anemia.  Hemoglobin is 9.7.  Anemia workup is ordered.  · 12/03/13 - Ferritin 179.8 (N), folic acid 8.3 (N),  vitamin B12 314, iron 104 (N), TIBC 298 (N), iron saturation 35 (N), Reticulocyte count 0.88 (N).  EPO level 124 (N).  Haptoglobin 22 (H).  · 10/22/14 - Hemoglobin 12.5, hematocrit 37.3, MCV 91.6.  · 10/23/14 - Anemia resolved.   · 3/8/16 - WBC 5.8, hemoglobin 11.7, MCV 90.3, platelet count 245,000. Vitamin B12 of 189 (180-914), ferritin 61 (), iron 91 (), TIBC 345 (228-428).   · 3/15/16 -  ().        · 3/22/16 - Intrinsic factor antibody positive, antiparietal antibody negative. Vitamin B12 at 1000 mcg IM weekly started, but the patient after receiving first dose on 3/22/16 did not come back for injections until 4/13/16.   · 4/13/16 - WBC 5.1, hemoglobin 12.5, MCV 87.9, platelet count 201,000. Patient given B12 injection and then continued at home.   · 5/10/16 - WBC 5.1, hemoglobin 12.5, platelet count 201,000.   · 6/14/16 - Monthly Vitamin B12 injections continued at home.   · 7/11/16 - WBC 5.48, hemoglobin 12.7, MCV 86.2, platelet count 200,000.   · 8/16/16 - Patient continued on monthly Vitamin B12 injections at home.   · 1/5/17 - WBC 2.6 with 38% neutrophils, 56% lymphocytes, 5% monocytes, hemoglobin 10.5, .3, platelet count 63,000. Patient continued on Vitamin B12 injections 1000 mcg IM monthly at home.   · 3/20/17 - Retic 0.41 (0.5-1.5), creatinine 1.0 (0.4-1.0). Iron 12 (), TIBC 270 (228-428), ferritin 259 (), haptoglobin 340 (), TSH 0.43 (0.34-5.6), folate 6.0 (5.9-24.8). Serum protein electrophoresis revealed decreased gammaglobulins. Serum EDU had no monoclonal gammopathy identified. Stool Hemoccult was negative.   · 6/8/17 - WBC 6.3, hemoglobin 8.3, MCV 92.4, platelet count 50,000. Procrit 40,000 units subq weekly added for chemotherapy-induced anemia. Patient continued on Vitamin B12 at 1000 mcg IM monthly at home.   · 7/5/17 - Iron 33 (), TIBC 328 (228-428), ferritin 211 (), TSH 2.46 (0.34-5.6).       · 7/31/17 - WBC 5.0, hemoglobin 11.2,  MCV 89.7, platelet count 217,000. We will continue with the Procrit 40,000 units subq weekly for chemo-induced anemia when the hemoglobin falls below 10.0 and the patient is also to continue with the Vitamin B12 at 1000 mcg IM monthly at home. Creatinine 1.24 (0.5-0.99).    · 8/28/17 - WBC 9.6, hemoglobin 8.7, MCV 93.7, platelet count 133,000. Patient is to continue with the Procrit 40,000 units subq weekly and will receive that today. She is also to continue with the Vitamin B12 at 1000 mcg IM monthly at home.  · 10/16/17 - WBC 7.5, hemoglobin 9.6, MCV 98.4, platelet count 195,000. Patient is to continue with Procrit 40,000 units subq weekly and will receive Procrit today.   · 1/1/18 - Creatinine 1.3 (0.4-1.0).    · 2/19/18 - Procrit given for hemoglobin 9.9.   · 2/26/18 - Continue Procrit 40,000 units weekly p.r.n. hemoglobin <10. Continue Vitamin B12 at 1000 mcg IM monthly at home.   · 3/26/18 - Hemoglobin 8.3. Procrit dose increased to 60,000 units weekly. Iron 29 (), TIBC 306 (228-428), and iron sat 9% (15-50). Iron 29 (), TIBC 306 (228-428), iron saturation 9% (15-50).   · 3/27/18 - Order written to start Ferrous sulfate 325 mg p.o. b.i.d.    · 4/2/18 - Stool heme negative x3.   · 4/30/18 - Patient had not started Ferrous sulfate 325 mg p.o. b.i.d. and verbalized understanding to do so and to notify the office if side effects are not tolerable.   · 5/24/18 - Patient was hospitalized at [facility] between 5/24/18 and 5/26/18 with dark tarry stool. INR was subtherapeutic. She was given IV Protonix and Protonix 40 mg daily. She underwent an EGD by David Rogers M.D. revealing small hiatal hernia, small submucosal nodule near the cardia, erosive gastritis. Pathology on gastric antrum and body biopsy revealed iron pill gastritis and no evidence of Helicobacter pylori. She then underwent a colonoscopy revealing diverticulosis, hemorrhoids and surgical changes from previous resection. It was recommended  to consider outpatient M2A if hemoglobin continued to drop.   · 6/4/18 - Order written to discontinue iron pills and to use Injectafer 750 mg IV days 1 and 8 for low iron sats. Order written for Procrit 40,000 units subq weekly. Iron 65 (), TIBC 328 (228-428), iron saturation 20% (15-50), ferritin 123 ().   · 6/29/18 - Discussed patient’s intolerance of oral iron due to gastritis. Oral iron discontinued with plans for Injectafer should iron level drop in the future.   · 11/7/18 - Patient claims not to be taking Vitamin B12 injections at home. Asked to resume those.   · 12/3/18 - Patient reports she has not been taking her B12 injections for a couple of months. She needs some syringes and needles for that.   · 2/4/19 - Patient is uncertain if she is currently taking Ferrous Sulfate or not. Patient with history of intolerance and will follow hemoglobin.   · 5/8/19 - Ferritin 64 ().  · 8/27/2019- iron 52 (), iron saturation 14% (15-50), TIBC 364 (228-420), and ferritin 74 ().  Injectafer 750 mg IV day 1 and 8 due to oral iron intolerance ordered.  · 8/30/2019- Injectafer 750 mg IV day 1 given.  Hemoglobin 10.8.  At the end of the infusion heart rate was 48 and the patient reported mild dizziness.  Patient was sent to the ED for evaluation with symptoms resolving rapidly.  Chest x-ray and CT head were negative for acute findings.  · 9/6/2019-Injectafer 750 mg IV day 8 given without adverse effects.  Hemoglobin 9.8.   · 9/25/19 - Iron 85 (). Iron saturation 25 (15-50). TIBC 335 (228-428). Ferritin  694 ().  Hemoglobin 10.3.  · 10/25/2019 hemoglobin 9.7.  Patient started on Retacrit 40,000 units subcu weekly.    Past Medical History:   Diagnosis Date   • Anemia 2013   • Cervical disc disorder 2013    Herniated disc, pinched nerve   • Clotting disorder (CMS/HCC) 2013    Low platelets from chemo   • Colon polyp 2013   • Deep vein thrombosis (CMS/HCC) 2013   • Diverticulosis 2013   •  GERD (gastroesophageal reflux disease) 2016   • HL (hearing loss) 2016    I need hearing aids   • Hyperlipidemia 2013    Need my cholesterol rechecked   • Hypertension    • Joint pain 2013    Shoulder pain, torn rotator cuff   • Low back pain 2013   • Neuromuscular disorder (CMS/[facility]) 2015    Shingles, pinched nerves in my neck   • Osteopenia 2014   • Osteoporosis    • Ovarian cancer (CMS/[facility])     ovarian   • Ovarian cancer on left (CMS/[facility]) 1995   • Pneumonia 2010    Have had it several times   • Spinal stenosis 2013    Cervical   • Urinary tract infection 2013    Have had several utis       Past Surgical History:   Procedure Laterality Date   • CATARACT EXTRACTION     • COLON SURGERY     • COLONOSCOPY  05/26/2018   • EXPLORATORY LAPAROTOMY     • HERNIA REPAIR     • HYSTERECTOMY     • OOPHORECTOMY           Current Outpatient Medications:   •  acetaminophen (TYLENOL) 500 MG tablet, Take 1,000 mg by mouth Every 6 (Six) Hours As Needed for Mild Pain ., Disp: , Rfl:   •  atorvastatin (LIPITOR) 20 MG tablet, Take 20 mg by mouth every night at bedtime., Disp: , Rfl: 3  •  cyanocobalamin 1000 MCG/ML injection, Inject 1,000 mcg into the appropriate muscle as directed by prescriber Every 28 (Twenty-Eight) Days., Disp: , Rfl:   •  famotidine (PEPCID) 40 MG tablet, TAKE 1 TABLET BY MOUTH EVERY MORNING BEFORE BREAKFAST, Disp: 90 tablet, Rfl: 1  •  loperamide (Imodium A-D) 2 MG tablet, Take 1 tablet by mouth 3 (Three) Times a Day As Needed for Diarrhea., Disp: 60 tablet, Rfl: 2  •  magnesium oxide (MAG-OX) 400 MG tablet, TAKE 1 TABLET BY MOUTH TWICE A DAY, Disp: 180 tablet, Rfl: 1  •  methylPREDNISolone (MEDROL) 4 MG dose pack, Take  by mouth Take As Directed. Take as directed on package instructions.  Dr. Delong prescribed., Disp: , Rfl:   •  ondansetron ODT (ZOFRAN-ODT) 8 MG disintegrating tablet, Place 1 tablet under the tongue Every 8 (Eight) Hours As Needed for Nausea or Vomiting., Disp: 30 tablet, Rfl: 3  •  oxyCODONE  (ROXICODONE) 10 MG tablet, Take 1 tablet by mouth 3 (Three) Times a Day As Needed for Moderate Pain ., Disp: 90 tablet, Rfl: 0  •  potassium chloride (K-DUR,KLOR-CON) 20 MEQ CR tablet, Take 40 mEq by mouth Every Evening., Disp: , Rfl:   •  pregabalin (LYRICA) 100 MG capsule, Take 1 capsule by mouth 3 (Three) Times a Day., Disp: 90 capsule, Rfl: 2  •  sertraline (ZOLOFT) 50 MG tablet, TAKE 1 TABLET BY MOUTH EVERY EVENING BEFORE BED, Disp: 90 tablet, Rfl: 1  •  temazepam (RESTORIL) 30 MG capsule, Take 1 capsule by mouth At Night As Needed for Sleep., Disp: 30 capsule, Rfl: 1  •  triamterene-hydrochlorothiazide (DYAZIDE) 37.5-25 MG per capsule, TAKE 1 CAPSULE BY MOUTH EVERY DAY, Disp: 90 capsule, Rfl: 1  •  warfarin (COUMADIN) 5 MG tablet, Take 1 tablet by mouth Daily. At 1700 as directed, Disp: 90 tablet, Rfl: 0  •  folic acid (FOLVITE) 1 MG tablet, Take 1 tablet by mouth Daily., Disp: 30 tablet, Rfl: 6  No current facility-administered medications for this visit.     Allergies   Allergen Reactions   • Morphine Nausea Only and Hives   • Penicillin V Potassium Rash   • Sulfa Antibiotics Rash   • Levaquin [Levofloxacin] Nausea Only and Dizziness     When taken with Coumadin   • Amoxicillin Rash   • Norco [Hydrocodone-Acetaminophen] Rash       Family History   Problem Relation Age of Onset   • Hypertension Mother    • Cancer Father    • Hypertension Father    • Hypertension Sister    • Hypertension Brother    • Stroke Brother        Cancer-related family history includes Cancer in her father.    Social History     Tobacco Use   • Smoking status: Never Smoker   • Smokeless tobacco: Never Used   Substance Use Topics   • Alcohol use: No     Frequency: Never     Comment: caffeine 32-64oz qd   • Drug use: No       I have reviewed and confirmed the accuracy of the patient's history: Chief complaint, HPI and ROS as entered by the MA/LPN/RN. Cindy Ball MD 12/17/20     SUBJECTIVE:    She is here today to review her  "results.  Is evidence of progressive disease in the chest, abdomen and pelvis.        REVIEW OF SYSTEMS:  Review of Systems   Constitutional: Negative for chills and fever.   HENT: Negative for ear pain, mouth sores, nosebleeds and sore throat.    Eyes: Negative for photophobia and visual disturbance.   Respiratory: Negative for wheezing and stridor.    Cardiovascular: Negative for chest pain and palpitations.   Gastrointestinal: Negative for abdominal pain, diarrhea, nausea and vomiting.   Endocrine: Negative for cold intolerance and heat intolerance.   Genitourinary: Negative for dysuria and hematuria.   Musculoskeletal: Negative for joint swelling and neck stiffness.   Skin: Negative for color change.   Neurological: Negative for seizures and syncope.   Hematological: Negative for adenopathy.   Psychiatric/Behavioral: Negative for agitation, confusion and hallucinations.     I have reviewed and confirmed the accuracy of the ROS as documented by the MA/LPN/RN Cindy Ball MD      OBJECTIVE:    Vitals:    12/17/20 1230   BP: 136/90   Pulse: 92   Resp: 18   Temp: 96.9 °F (36.1 °C)   TempSrc: Temporal   Weight: 82.1 kg (181 lb)   Height: 165.1 cm (65\")   PainSc: 0-No pain       ECOG  (1) Restricted in physically strenuous activity, ambulatory and able to do work of light nature    Physical Exam   Constitutional: She is oriented to person, place, and time. No distress.   HENT:   Head: Normocephalic and atraumatic.   Eyes: Conjunctivae are normal. Right eye exhibits no discharge. Left eye exhibits no discharge. No scleral icterus.   Neck: Normal range of motion. Neck supple. No thyromegaly present.   Cardiovascular: Normal rate, regular rhythm and normal heart sounds. Exam reveals no gallop and no friction rub.   Pulmonary/Chest: Effort normal. No stridor. No respiratory distress. She has no wheezes.   Abdominal: Soft. Bowel sounds are normal. She exhibits no mass. There is no abdominal tenderness. There is " no rebound and no guarding.   Musculoskeletal: Normal range of motion. No tenderness.   Lymphadenopathy:     She has no cervical adenopathy.   Neurological: She is alert and oriented to person, place, and time. She exhibits normal muscle tone.   Skin: Skin is warm. She is not diaphoretic. No erythema.   Psychiatric: Her behavior is normal. Judgment and thought content normal.   Nursing note and vitals reviewed.    I have reexamined the patient and the results are consistent with the previously documented exam. Cnidyalexandrea Ball MD     RECENT LABS  WBC   Date Value Ref Range Status   12/17/2020 7.34 3.40 - 10.80 10*3/mm3 Final   07/05/2020 4.04 (L) 4.5 - 11.0 10*3/uL Final     RBC   Date Value Ref Range Status   12/17/2020 4.11 3.77 - 5.28 10*6/mm3 Final   07/05/2020 3.68 (L) 4.0 - 5.2 10*6/uL Final     Hemoglobin   Date Value Ref Range Status   12/17/2020 11.9 (L) 12.0 - 15.9 g/dL Final   07/05/2020 11.5 (L) 12.0 - 16.0 g/dL Final     Hematocrit   Date Value Ref Range Status   12/17/2020 38.3 34.0 - 46.6 % Final   07/05/2020 35.3 (L) 36.0 - 46.0 % Final     MCV   Date Value Ref Range Status   12/17/2020 93.2 79.0 - 97.0 fL Final   07/05/2020 95.9 80.0 - 100.0 fL Final     MCH   Date Value Ref Range Status   12/17/2020 29.0 26.6 - 33.0 pg Final   07/05/2020 31.3 26.0 - 34.0 pg Final     MCHC   Date Value Ref Range Status   12/17/2020 31.1 (L) 31.5 - 35.7 g/dL Final   07/05/2020 32.6 31.0 - 37.0 g/dL Final     RDW   Date Value Ref Range Status   12/17/2020 15.6 (H) 12.3 - 15.4 % Final   07/05/2020 15.9 12.0 - 16.8 % Final     RDW-SD   Date Value Ref Range Status   12/17/2020 50.3 37.0 - 54.0 fl Final     MPV   Date Value Ref Range Status   12/17/2020 10.3 6.0 - 12.0 fL Final   07/05/2020 10.2 6.7 - 10.8 fL Final     Platelets   Date Value Ref Range Status   12/17/2020 223 140 - 450 10*3/mm3 Final   07/05/2020 158 140 - 440 10*3/uL Final     Neutrophil Rel %   Date Value Ref Range Status   07/05/2020 54.0 45 - 80  % Final     Neutrophil %   Date Value Ref Range Status   12/17/2020 64.3 42.7 - 76.0 % Final     Lymphocyte Rel %   Date Value Ref Range Status   07/05/2020 30.4 15 - 50 % Final     Lymphocyte %   Date Value Ref Range Status   12/17/2020 19.2 (L) 19.6 - 45.3 % Final     Monocyte Rel %   Date Value Ref Range Status   07/05/2020 12.4 0 - 15 % Final     Monocyte %   Date Value Ref Range Status   12/17/2020 14.9 (H) 5.0 - 12.0 % Final     Eosinophil %   Date Value Ref Range Status   12/17/2020 1.2 0.3 - 6.2 % Final   07/05/2020 3.0 0 - 7 % Final     Basophil Rel %   Date Value Ref Range Status   07/05/2020 0.2 0 - 2 % Final     Basophil %   Date Value Ref Range Status   12/17/2020 0.4 0.0 - 1.5 % Final     Immature Grans %   Date Value Ref Range Status   07/05/2020 0.0 0 % Final     Neutrophils Absolute   Date Value Ref Range Status   07/05/2020 2.18 2.0 - 8.8 10*3/uL Final     Neutrophils, Absolute   Date Value Ref Range Status   12/17/2020 4.72 1.70 - 7.00 10*3/mm3 Final     Lymphocytes Absolute   Date Value Ref Range Status   07/05/2020 1.23 0.7 - 5.5 10*3/uL Final     Lymphocytes, Absolute   Date Value Ref Range Status   12/17/2020 1.41 0.70 - 3.10 10*3/mm3 Final     Monocytes Absolute   Date Value Ref Range Status   07/05/2020 0.50 0.0 - 1.7 10*3/uL Final     Monocytes, Absolute   Date Value Ref Range Status   12/17/2020 1.09 (H) 0.10 - 0.90 10*3/mm3 Final     Eosinophils Absolute   Date Value Ref Range Status   07/05/2020 0.12 0.0 - 0.8 10*3/uL Final     Eosinophils, Absolute   Date Value Ref Range Status   12/17/2020 0.09 0.00 - 0.40 10*3/mm3 Final     Basophils Absolute   Date Value Ref Range Status   07/05/2020 0.01 0.0 - 0.2 10*3/uL Final     Basophils, Absolute   Date Value Ref Range Status   12/17/2020 0.03 0.00 - 0.20 10*3/mm3 Final     Immature Grans, Absolute   Date Value Ref Range Status   07/05/2020 0.00 <1 10*3/uL Final     nRBC   Date Value Ref Range Status   10/02/2020 0.1 0.0 - 0.2 /100 WBC Final        Lab Results   Component Value Date    GLUCOSE 88 12/17/2020    BUN 14 12/17/2020    CREATININE 0.95 12/17/2020    EGFRIFNONA 59 (L) 12/17/2020    EGFRIFAFRI >60 06/07/2019    BCR 14.7 12/17/2020    K 4.6 12/17/2020    CO2 29.0 12/17/2020    CALCIUM 9.8 12/17/2020    ALBUMIN 4.10 12/17/2020    LABIL2 1.3 07/03/2020    AST 17 12/17/2020    ALT 8 12/17/2020           ASSESSMENT:    1. Recurrent ovarian cancer metastases to the brain was on carboplatinum, Doxil.  Patient had had carboplatinum Taxol, carbo Taxotere, Gemzar single agent, Niraparib and was on Doxil and carboplatinum.  Doxil was discontinued due to approaching of lifetime dosing.  Progressed on single agent topotecan.  Refer to paradigm testing for future options at time of progression.  Patient has had progression of disease and therefore platinum was discontinued.   2. Patient has evidence of disease progression in the chest, abdomen and pelvis now with bone involvement as well  3. Progressive brain metastasis: Status post stereotactic brain radiation under the care of Dr. Delong and recent MRI of the brain shows probable progression.  Dr. Delong following  4. Iron deficiency anemia: Intermittently on  Iron  5. History of DVT on anticoagulation therapy with warfarin  6. Pancytopenia: Due to chemotherapy: On Procrit  7. Hypomagnesemia: Continue to monitor levels and infuse as needed  8. B12 deficiency on parenteral B12 injections  9. Monitoring for chemotherapy toxicities  10. Warfarin in anticipation of cataract next week on 11/23/2020.  Patient to restart anticoagulation therapy at the discretion of her surgeon  11. Assessment has been reviewed and updated on 12/17/2020          PLANS:       Discontinue topotecan due to progressive disease  Begin Alimta 500 mg per metered squared IV q. 21 days.  Reviewed side effects in detail with patient  Begin folic acid 1 mg p.o. daily  Begin B12 injections 1000 mcg IM q. 9 weeks   monthly: Check levels today.   Ongoing  Continue magnesium oxide 400 mg three times a day and weekly IV replacement as needed.  Ongoing management  Continue Retacrit 40,000 units subcu weekly for hemoglobin less than 10, For chemotherapy-induced anemia  Magic mouthwash PRN for mucositis  All questions answered to the best of my abilities  Follow-up in 2 weeks with NP  All questions answered           I have reviewed labs results, imaging, vitals, and medications with the patient today. Will follow up in 3 weeks  with me.    All questions answered to the best of my abilities    Patient verbalized understanding and is in agreement of the above plans.      I spent 40 total minutes, face-to-face, caring for Tahira today.  90% of this time involved counseling and/or coordination of care as documented within this note.         Melolabial Transposition Flap Text: The defect edges were debeveled with a #15 scalpel blade.  Given the location of the defect and the proximity to free margins a melolabial flap was deemed most appropriate.  Using a sterile surgical marker, an appropriate melolabial transposition flap was drawn incorporating the defect.    The area thus outlined was incised deep to adipose tissue with a #15 scalpel blade.  The skin margins were undermined to an appropriate distance in all directions utilizing iris scissors.

## 2023-04-28 NOTE — TELEPHONE ENCOUNTER
Contacted patient he advised he is going to wait until he gets back into town in June to schedule.    Provider: DR CABRERA    Caller: DANIELLE    Relationship to Patient: [employer]IVER    Pharmacy: [facility]    Phone Number: [phone]    Reason for Call: DANIELLE IS CALLING STATES THAT EDE HAS SHINGLES, SHE HAS THEM ON HER EYE, HER SCALP, AND IT IS TINGLY ITCHY AND BURNING.    DANIELLE STATES THAT SHE HAS HAD THIS BEFORE AND SHE IS ASKING FOR SOMETHING TO BE CALLED IN.    DANIELLE ALSO STATES THAT DR CABRERA DOESN'T ANSWER HER MESSAGES UNTIL SHE IS DONE WITH PATIENTS AND DANIELLE DOES NOT WANT TO WAIT TILL 5:00, SHE NEEDS HER HELPED TODAY. AND IF SHE IS GOING TO WAIT ALL DAY TO LET HER KNOW AND SHE WILL SEEK HELP FROM SOMEONE ELSE.

## 2023-07-12 NOTE — PROGRESS NOTES
PATIENTS ONCOLOGY RECORD LOCATED IN [facility]      Subjective     Name:  EDE ROBLERO     Date:  10/30/2017  Address:  [facility] [address] [place] [address]  Home: [phone]  :  1955 AGE:  62 y.o.        RECORDS OBTAINED:  Patients Oncology Record is located in [facility]   Vaccine status unknown

## 2024-05-15 NOTE — TELEPHONE ENCOUNTER
----- Message from IRENA Dumas sent at 12/30/2020 12:25 PM EST -----  Anna Marie,     Let her know she needs IV iron weekly x 2.  She is iron deficient. Orders placed.       Electronically signed by IRENA Dumas, 12/30/20, 12:25 PM EST.    
Pt returned call. I let her know that her iron was low and that she will need IV iron once it is approved by her insurance. Pt verbalized understanding.   
190.5

## (undated) DEVICE — DRSNG SURESITE WNDW 4X4.5

## (undated) DEVICE — SUT VIC FS2 4/0 27IN J422H

## (undated) DEVICE — GLV SURG BIOGEL LTX PF 7 1/2

## (undated) DEVICE — GAUZE,SPONGE,FLUFF,6"X6.75",STRL,5/TRAY: Brand: MEDLINE

## (undated) DEVICE — ERBE NESSY®PLATE 170 SPLIT; 168CM²; CABLE 3M: Brand: ERBE

## (undated) DEVICE — UNDERGLV SURG BIOGEL INDICAT PF 8 GRN

## (undated) DEVICE — BAPTIST FLOYD BRONCHOSCOPY: Brand: MEDLINE INDUSTRIES, INC.

## (undated) DEVICE — CUFF SCD HEMOFORCE SEQ CALF STD MD

## (undated) DEVICE — SUT VIC 3/0 SH 27IN J416H

## (undated) DEVICE — KT SURG TURNOVER 050

## (undated) DEVICE — PENCL HND ROCKRSWTCH HOLSTR EZ CLEAN TP CRD 10FT

## (undated) DEVICE — PENCL EVAC ULTRAVAC SMOKE W/BLD

## (undated) DEVICE — SOL IRR NACL 0.9PCT 1000ML

## (undated) DEVICE — PK MINOR LAPAROTOMY 50